# Patient Record
Sex: FEMALE | Race: BLACK OR AFRICAN AMERICAN | NOT HISPANIC OR LATINO | URBAN - METROPOLITAN AREA
[De-identification: names, ages, dates, MRNs, and addresses within clinical notes are randomized per-mention and may not be internally consistent; named-entity substitution may affect disease eponyms.]

---

## 2017-11-11 ENCOUNTER — EMERGENCY (EMERGENCY)
Facility: HOSPITAL | Age: 67
LOS: 1 days | Discharge: HOME | End: 2017-11-11
Attending: EMERGENCY MEDICINE | Admitting: EMERGENCY MEDICINE
Payer: MEDICARE

## 2017-11-11 VITALS
DIASTOLIC BLOOD PRESSURE: 77 MMHG | OXYGEN SATURATION: 100 % | HEART RATE: 82 BPM | SYSTOLIC BLOOD PRESSURE: 173 MMHG | RESPIRATION RATE: 19 BRPM

## 2017-11-11 DIAGNOSIS — Z95.4 PRESENCE OF OTHER HEART-VALVE REPLACEMENT: Chronic | ICD-10-CM

## 2017-11-11 LAB
ALBUMIN SERPL ELPH-MCNC: 4.1 G/DL — SIGNIFICANT CHANGE UP (ref 3.3–5)
ALP SERPL-CCNC: 125 U/L — HIGH (ref 40–120)
ALT FLD-CCNC: 17 U/L — SIGNIFICANT CHANGE UP (ref 4–33)
APTT BLD: 53 SEC — HIGH (ref 27.5–37.4)
AST SERPL-CCNC: 32 U/L — SIGNIFICANT CHANGE UP (ref 4–32)
BASOPHILS # BLD AUTO: 0.02 K/UL — SIGNIFICANT CHANGE UP (ref 0–0.2)
BASOPHILS # BLD AUTO: 0.04 K/UL — SIGNIFICANT CHANGE UP (ref 0–0.2)
BASOPHILS NFR BLD AUTO: 0.3 % — SIGNIFICANT CHANGE UP (ref 0–2)
BASOPHILS NFR BLD AUTO: 0.6 % — SIGNIFICANT CHANGE UP (ref 0–2)
BILIRUB SERPL-MCNC: 0.5 MG/DL — SIGNIFICANT CHANGE UP (ref 0.2–1.2)
BLD GP AB SCN SERPL QL: NEGATIVE — SIGNIFICANT CHANGE UP
BUN SERPL-MCNC: 21 MG/DL — SIGNIFICANT CHANGE UP (ref 7–23)
CALCIUM SERPL-MCNC: 9.3 MG/DL — SIGNIFICANT CHANGE UP (ref 8.4–10.5)
CHLORIDE SERPL-SCNC: 97 MMOL/L — LOW (ref 98–107)
CO2 SERPL-SCNC: 31 MMOL/L — SIGNIFICANT CHANGE UP (ref 22–31)
CREAT SERPL-MCNC: 1.02 MG/DL — SIGNIFICANT CHANGE UP (ref 0.5–1.3)
EOSINOPHIL # BLD AUTO: 0.03 K/UL — SIGNIFICANT CHANGE UP (ref 0–0.5)
EOSINOPHIL # BLD AUTO: 0.14 K/UL — SIGNIFICANT CHANGE UP (ref 0–0.5)
EOSINOPHIL NFR BLD AUTO: 0.4 % — SIGNIFICANT CHANGE UP (ref 0–6)
EOSINOPHIL NFR BLD AUTO: 2.2 % — SIGNIFICANT CHANGE UP (ref 0–6)
GLUCOSE SERPL-MCNC: 131 MG/DL — HIGH (ref 70–99)
HCT VFR BLD CALC: 29.5 % — LOW (ref 34.5–45)
HCT VFR BLD CALC: 31.6 % — LOW (ref 34.5–45)
HGB BLD-MCNC: 8.4 G/DL — LOW (ref 11.5–15.5)
HGB BLD-MCNC: 8.9 G/DL — LOW (ref 11.5–15.5)
IMM GRANULOCYTES # BLD AUTO: 0.02 # — SIGNIFICANT CHANGE UP
IMM GRANULOCYTES # BLD AUTO: 0.03 # — SIGNIFICANT CHANGE UP
IMM GRANULOCYTES NFR BLD AUTO: 0.3 % — SIGNIFICANT CHANGE UP (ref 0–1.5)
IMM GRANULOCYTES NFR BLD AUTO: 0.4 % — SIGNIFICANT CHANGE UP (ref 0–1.5)
INR BLD: 2.51 — HIGH (ref 0.88–1.17)
LYMPHOCYTES # BLD AUTO: 1.24 K/UL — SIGNIFICANT CHANGE UP (ref 1–3.3)
LYMPHOCYTES # BLD AUTO: 18.5 % — SIGNIFICANT CHANGE UP (ref 13–44)
LYMPHOCYTES # BLD AUTO: 2.64 K/UL — SIGNIFICANT CHANGE UP (ref 1–3.3)
LYMPHOCYTES # BLD AUTO: 41.2 % — SIGNIFICANT CHANGE UP (ref 13–44)
MCHC RBC-ENTMCNC: 24.7 PG — LOW (ref 27–34)
MCHC RBC-ENTMCNC: 24.7 PG — LOW (ref 27–34)
MCHC RBC-ENTMCNC: 28.2 % — LOW (ref 32–36)
MCHC RBC-ENTMCNC: 28.5 % — LOW (ref 32–36)
MCV RBC AUTO: 86.8 FL — SIGNIFICANT CHANGE UP (ref 80–100)
MCV RBC AUTO: 87.5 FL — SIGNIFICANT CHANGE UP (ref 80–100)
MONOCYTES # BLD AUTO: 0.54 K/UL — SIGNIFICANT CHANGE UP (ref 0–0.9)
MONOCYTES # BLD AUTO: 0.63 K/UL — SIGNIFICANT CHANGE UP (ref 0–0.9)
MONOCYTES NFR BLD AUTO: 8 % — SIGNIFICANT CHANGE UP (ref 2–14)
MONOCYTES NFR BLD AUTO: 9.8 % — SIGNIFICANT CHANGE UP (ref 2–14)
NEUTROPHILS # BLD AUTO: 2.96 K/UL — SIGNIFICANT CHANGE UP (ref 1.8–7.4)
NEUTROPHILS # BLD AUTO: 4.83 K/UL — SIGNIFICANT CHANGE UP (ref 1.8–7.4)
NEUTROPHILS NFR BLD AUTO: 46.2 % — SIGNIFICANT CHANGE UP (ref 43–77)
NEUTROPHILS NFR BLD AUTO: 72.1 % — SIGNIFICANT CHANGE UP (ref 43–77)
NRBC # FLD: 0 — SIGNIFICANT CHANGE UP
NRBC # FLD: 0 — SIGNIFICANT CHANGE UP
PLATELET # BLD AUTO: 348 K/UL — SIGNIFICANT CHANGE UP (ref 150–400)
PLATELET # BLD AUTO: 364 K/UL — SIGNIFICANT CHANGE UP (ref 150–400)
PMV BLD: 10.5 FL — SIGNIFICANT CHANGE UP (ref 7–13)
PMV BLD: 9.9 FL — SIGNIFICANT CHANGE UP (ref 7–13)
POTASSIUM SERPL-MCNC: 4.3 MMOL/L — SIGNIFICANT CHANGE UP (ref 3.5–5.3)
POTASSIUM SERPL-SCNC: 4.3 MMOL/L — SIGNIFICANT CHANGE UP (ref 3.5–5.3)
PROT SERPL-MCNC: 8.9 G/DL — HIGH (ref 6–8.3)
PROTHROM AB SERPL-ACNC: 28.7 SEC — HIGH (ref 9.8–13.1)
RBC # BLD: 3.4 M/UL — LOW (ref 3.8–5.2)
RBC # BLD: 3.61 M/UL — LOW (ref 3.8–5.2)
RBC # FLD: 17 % — HIGH (ref 10.3–14.5)
RBC # FLD: 17.1 % — HIGH (ref 10.3–14.5)
RH IG SCN BLD-IMP: POSITIVE — SIGNIFICANT CHANGE UP
SODIUM SERPL-SCNC: 139 MMOL/L — SIGNIFICANT CHANGE UP (ref 135–145)
WBC # BLD: 6.41 K/UL — SIGNIFICANT CHANGE UP (ref 3.8–10.5)
WBC # BLD: 6.71 K/UL — SIGNIFICANT CHANGE UP (ref 3.8–10.5)
WBC # FLD AUTO: 6.41 K/UL — SIGNIFICANT CHANGE UP (ref 3.8–10.5)
WBC # FLD AUTO: 6.71 K/UL — SIGNIFICANT CHANGE UP (ref 3.8–10.5)

## 2017-11-11 PROCEDURE — 99218: CPT | Mod: 25,GC

## 2017-11-11 PROCEDURE — 30901 CONTROL OF NOSEBLEED: CPT | Mod: 50,GC

## 2017-11-11 RX ORDER — LISINOPRIL 2.5 MG/1
20 TABLET ORAL DAILY
Qty: 0 | Refills: 0 | Status: DISCONTINUED | OUTPATIENT
Start: 2017-11-11 | End: 2017-11-15

## 2017-11-11 RX ORDER — SIMVASTATIN 20 MG/1
5 TABLET, FILM COATED ORAL AT BEDTIME
Qty: 0 | Refills: 0 | Status: DISCONTINUED | OUTPATIENT
Start: 2017-11-11 | End: 2017-11-15

## 2017-11-11 RX ORDER — METOPROLOL TARTRATE 50 MG
25 TABLET ORAL DAILY
Qty: 0 | Refills: 0 | Status: DISCONTINUED | OUTPATIENT
Start: 2017-11-11 | End: 2017-11-15

## 2017-11-11 RX ORDER — DIGOXIN 250 MCG
0.12 TABLET ORAL DAILY
Qty: 0 | Refills: 0 | Status: DISCONTINUED | OUTPATIENT
Start: 2017-11-11 | End: 2017-11-15

## 2017-11-11 RX ORDER — FUROSEMIDE 40 MG
40 TABLET ORAL DAILY
Qty: 0 | Refills: 0 | Status: DISCONTINUED | OUTPATIENT
Start: 2017-11-11 | End: 2017-11-15

## 2017-11-11 RX ORDER — CEPHALEXIN 500 MG
500 CAPSULE ORAL ONCE
Qty: 0 | Refills: 0 | Status: COMPLETED | OUTPATIENT
Start: 2017-11-11 | End: 2017-11-11

## 2017-11-11 RX ADMIN — Medication 0.12 MILLIGRAM(S): at 15:42

## 2017-11-11 RX ADMIN — Medication 25 MILLIGRAM(S): at 15:45

## 2017-11-11 RX ADMIN — LISINOPRIL 20 MILLIGRAM(S): 2.5 TABLET ORAL at 15:41

## 2017-11-11 RX ADMIN — SIMVASTATIN 5 MILLIGRAM(S): 20 TABLET, FILM COATED ORAL at 21:17

## 2017-11-11 RX ADMIN — Medication 500 MILLIGRAM(S): at 15:42

## 2017-11-11 RX ADMIN — Medication 40 MILLIGRAM(S): at 15:42

## 2017-11-11 NOTE — CONSULT NOTE ADULT - ASSESSMENT
67F with epistaxis on AC for MVR/TVR, now resolved. Packing has been removed. Patient currently has absorbable packing in place and has been instructed not to blow her nose.  -Nasal saline sprays 4 times per day  -Afrin and pressure if any bleeding recurs  -f/u CBC  -Dispo per ED

## 2017-11-11 NOTE — ED ADULT NURSE NOTE - OBJECTIVE STATEMENT
patient brought in by ambulance for nose bleeding, patient reports nose bleeding happened when she woke up from bed, no trauma or injury, denies: dizziness, SOB, chest pain, nausea/vomiting. Patient is alert and orients to person, place and time, is seeing by attending Dr. Valencia and resident at bed side, Phenylephrine HCl and nose pack applied per Dr. Valencia, on cardiac monitor, Osei, lab work sent, nose bleeding is controlled, airway is clear, Breathing spontaneous and unlabored. Breath sounds clear and equal bilaterally with regular rhythm. chest movement equally bilaterally, safety precautions maintain per hospital policy. patient brought in by ambulance for nose bleeding, patient reports nose bleeding happened when she woke up from bed, no trauma or injury, denies: dizziness, SOB, chest pain, nausea/vomiting. Patient is alert and orients to person, place and time, is seeing by attending Dr. Valencia and resident at bed side, Phenylephrine HCl nose spray and nose pack right and left nostrils applied per Dr. Valencia, on cardiac monitor, Osei, lab work sent, nose bleeding is controlled, airway is clear, Breathing spontaneous and unlabored. Breath sounds clear and equal bilaterally with regular rhythm. chest movement equally bilaterally, safety precautions maintain per hospital policy.

## 2017-11-11 NOTE — ED CDU PROVIDER INITIAL DAY NOTE - FAMILY HISTORY
Family history of stroke     Father  Still living? Unknown  Family history of hypertension, Age at diagnosis: Age Unknown     Sibling  Still living? Unknown  Family history of hypertension, Age at diagnosis: Age Unknown     Mother  Still living? Unknown  Family history of hypertension, Age at diagnosis: Age Unknown

## 2017-11-11 NOTE — ED ADULT TRIAGE NOTE - CHIEF COMPLAINT QUOTE
notification nose bleed for past 25 minutes, on coumadin, denies trauma/injury, patient states she "woke up like this".  EMS applying pressure to nose, clots present, patient alert, oriented x3, denies pain.  Hypertensive, respiratory status stable on room air, saturation adequate, speaking in full sentences.

## 2017-11-11 NOTE — ED PROVIDER NOTE - ATTENDING CONTRIBUTION TO CARE
Attending Statement: I have personally seen and examined this patient. I have fully participated in the care of this patient. I have reviewed all pertinent clinical information, including history physical exam, plan and the Resident's note and agree except as noted   66yo F BIBEMS from home co nose bleed. pt states she woke up feeling "something in the throat" found to be bleeding. Applied pressure and called EMS. No chest pain. No sob. no trauma. hx of MVR/TVR, atrial fib on coumadin, CHF,  COPD, hx of nose bleed a year ago.   Vital signs noted. pt sitting up bleeding from both nares right greater than left w clots. talking in full sentences. good air entry. no sign of facial trauma. slight blood in the posterior nemesio pharynx.  plan labs, ENT, nose packing, re assess

## 2017-11-11 NOTE — ED PROVIDER NOTE - PMH
Atrial fibrillation  S/P Ablation  CHF (congestive heart failure)    COPD (chronic obstructive pulmonary disease)  on home O2  Gout    HTN (hypertension)    Mitral valve replaced    MIGUEL (obstructive sleep apnea)    Pulmonary hypertension    Tricuspid valve replaced

## 2017-11-11 NOTE — ED CDU PROVIDER INITIAL DAY NOTE - PROGRESS NOTE DETAILS
CDU GERI Garcia: Spoke with ENT, pt tolerated removal of nasal packing well, no active bleeding, will repeat CBC and monitor overnight GERI Omalley: Pt NAD, VSS, no symptoms at this time. Not actively bleeding. PE: cardiac: RRR, Lungs:CTA, Abdomen: soft, nontender, nondistended. Pending AM labs.

## 2017-11-11 NOTE — ED CDU PROVIDER INITIAL DAY NOTE - OBJECTIVE STATEMENT
67yF w/pmhx a-fib on coumadin, CHF, COPD, s/p MVR/TVR presenting with epistaxis that began this morning. Pt states she was lying in bed when she felt blood dripping down her face. She states she soaked 1.5 hand towels prior to arrival and the ED. Pt states she is short of breath with exertion but this is her baseline. Pt denies lightheadedness, dizziness, palpitations, chest pain, abdominal pain, n/v/d, f/c, hematuria, dark stools or any other complaints. Pt has had prior episode of epistaxis while on coumadin.     In the ED vinyl rockets were places in both nares, 7.5cm in right nare and 5.5cm in left nare. Pt has had stable vital signs throughout visit without any difficulty breathing or protecting her airway. 67yF w/pmhx a-fib on coumadin, CHF, COPD, s/p MVR/TVR presenting with epistaxis that began this morning. Pt states she was lying in bed when she felt blood dripping down her face. She states she soaked 1.5 hand towels prior to arrival and the ED. Pt states she is short of breath with exertion but this is her baseline. Pt denies trauma/injury, lightheadedness, dizziness, palpitations, chest pain, abdominal pain, n/v/d, f/c, hematuria, dark stools or any other complaints. Pt has had prior episode of epistaxis while on coumadin.     In the ED vinyl rockets were places in both nares, 7.5cm in right nare and 5.5cm in left nare. Pt has had stable vital signs throughout visit without any difficulty breathing or protecting her airway. 67yF w/pmhx a-fib on coumadin, CHF, COPD, s/p MVR/TVR presenting with epistaxis that began this morning. Pt states she was lying in bed when she felt blood dripping down her face at approximately 7AM. She states she soaked 1.5 hand towels prior to arrival and the ED and was unable to stop the bleeding. Pt states she is short of breath with exertion but this is her baseline. Pt denies trauma/injury, lightheadedness, dizziness, palpitations, chest pain, abdominal pain, n/v/d, f/c, hematuria, dark stools or any other complaints. Pt has had prior episode of epistaxis while on coumadin.     In the ED vinyl rockets were places in both nares, 7.5cm in right nare and 5.5cm in left nare. Pt has had stable vital signs throughout visit without any difficulty breathing or protecting her airway.

## 2017-11-11 NOTE — ED CDU PROVIDER INITIAL DAY NOTE - PHYSICAL EXAMINATION
Packing is in place in nares b/l with no signs of active bleeding Packing is in place in nares b/l   ENT: Posterior pharynx difficult to visualize given pt anatomy

## 2017-11-11 NOTE — ED PROVIDER NOTE - MEDICAL DECISION MAKING DETAILS
67F s/p MVR/TVR, afib, on coumadin presenting with a cc of intractable epistaxis beginning earlier this morning, no nose/facial trauma, was sleeping when felt post nasal drip, noted to be bleeding, x1 prior episode, this more severe, no lightheadedness, dizziness, on exam vss, jacey epistaxis R>L s/p nasal packing w/ nasal phenylephrine R:Ant/Post L: Ant, ENT consult, likely admit vs cdu

## 2017-11-11 NOTE — ED CDU PROVIDER INITIAL DAY NOTE - ATTENDING CONTRIBUTION TO CARE
DR Bloch- Morbidly obese woman, on coumadin for afib, c/o epistaxis, controlled in ED with bilateral rhinorockets, VSS afebrile, HEENT rhino rockets in place, no active bleeding, throat mallampati 4 , neck supple, heart sounds nml lungs clear, abd soft nontender IMP epistaxis, controlled- on coumadin- will hold coumadin, , ent consult, reassess

## 2017-11-11 NOTE — ED CDU PROVIDER INITIAL DAY NOTE - MEDICAL DECISION MAKING DETAILS
67yF w/pmhx a-fib on coumadin, CHF, COPD, s/p MVR/TVR presenting with epistaxis. Packing inserted in main ED. In CDU for ENT consult and to recheck CBC at 2PM.

## 2017-11-11 NOTE — ED PROVIDER NOTE - PROGRESS NOTE DETAILS
pt still slight bleeding from the right nare. no sob. pending ENT consult. MICHELLE w ENT will comes see pt in approx 2 hours. pt informed. bleeding controlled. Pt assessed at beside. Pt resting comfortably, pain controlled, pt questions answered. Vital signs stable. Bleeding controlled. Discussed case with CDU PA, plan to repeat h/h at 1400, ENT f/u, plan per ENT, consider d/c if cleared by ENT. Will send pt to CDU  Thee Mcelroy MD  PGY-1 Emergency Medicine

## 2017-11-11 NOTE — CONSULT NOTE ADULT - SUBJECTIVE AND OBJECTIVE BOX
67F PMHx MVR/TVR on coumadin presented with epistaxis starting this morning. After presentation to ED, bilateral rhino rockets were placed, which dramatically reduced the bleeding. Pt reports no bleeding for the past several hours. No history of nasal trauma. no dizziness or lightheadedness. No prior nasal surgeries     Past Medical History:  Atrial fibrillation  S/P Ablation  CHF (congestive heart failure)    COPD (chronic obstructive pulmonary disease)  on home O2  Gout    HTN (hypertension)    Mitral valve replaced    MIGUEL (obstructive sleep apnea)    Pulmonary hypertension    Tricuspid valve replaced.     Past Surgical History:  History of mitral valve replacement with mechanical valve    Tricuspid valve replaced  mechanical.    NKDA    ROS all 14 systems reviewed otherwise negative    Vital signs reviewed in EMR, stable, afebrile  NAD, alert  NC/AT  EOMI  NC: bilateral rapid rhinos in place, inflated, no drainage or bleeding  OC: tongue wnl, OP clear, no blood present in OP  Neck: soft/flat    Procedure: Nasal packing was deflated and removed. Irrigated nasal cavity with NS bilaterally. Applied afrin soaked surgicel bilaterally. Pt tolerated well. No evidence of bleeding

## 2017-11-12 VITALS
SYSTOLIC BLOOD PRESSURE: 157 MMHG | HEART RATE: 65 BPM | DIASTOLIC BLOOD PRESSURE: 86 MMHG | TEMPERATURE: 98 F | RESPIRATION RATE: 18 BRPM | OXYGEN SATURATION: 96 %

## 2017-11-12 LAB
APTT BLD: 49.5 SEC — HIGH (ref 27.5–37.4)
BASOPHILS # BLD AUTO: 0.03 K/UL — SIGNIFICANT CHANGE UP (ref 0–0.2)
BASOPHILS NFR BLD AUTO: 0.4 % — SIGNIFICANT CHANGE UP (ref 0–2)
EOSINOPHIL # BLD AUTO: 0.08 K/UL — SIGNIFICANT CHANGE UP (ref 0–0.5)
EOSINOPHIL NFR BLD AUTO: 1.1 % — SIGNIFICANT CHANGE UP (ref 0–6)
HCT VFR BLD CALC: 30.4 % — LOW (ref 34.5–45)
HGB BLD-MCNC: 8.6 G/DL — LOW (ref 11.5–15.5)
IMM GRANULOCYTES # BLD AUTO: 0.02 # — SIGNIFICANT CHANGE UP
IMM GRANULOCYTES NFR BLD AUTO: 0.3 % — SIGNIFICANT CHANGE UP (ref 0–1.5)
INR BLD: 2.21 — HIGH (ref 0.88–1.17)
LYMPHOCYTES # BLD AUTO: 1.67 K/UL — SIGNIFICANT CHANGE UP (ref 1–3.3)
LYMPHOCYTES # BLD AUTO: 24 % — SIGNIFICANT CHANGE UP (ref 13–44)
MCHC RBC-ENTMCNC: 24.8 PG — LOW (ref 27–34)
MCHC RBC-ENTMCNC: 28.3 % — LOW (ref 32–36)
MCV RBC AUTO: 87.6 FL — SIGNIFICANT CHANGE UP (ref 80–100)
MONOCYTES # BLD AUTO: 0.6 K/UL — SIGNIFICANT CHANGE UP (ref 0–0.9)
MONOCYTES NFR BLD AUTO: 8.6 % — SIGNIFICANT CHANGE UP (ref 2–14)
NEUTROPHILS # BLD AUTO: 4.57 K/UL — SIGNIFICANT CHANGE UP (ref 1.8–7.4)
NEUTROPHILS NFR BLD AUTO: 65.6 % — SIGNIFICANT CHANGE UP (ref 43–77)
NRBC # FLD: 0 — SIGNIFICANT CHANGE UP
PLATELET # BLD AUTO: 384 K/UL — SIGNIFICANT CHANGE UP (ref 150–400)
PMV BLD: 9.8 FL — SIGNIFICANT CHANGE UP (ref 7–13)
PROTHROM AB SERPL-ACNC: 25.1 SEC — HIGH (ref 9.8–13.1)
RBC # BLD: 3.47 M/UL — LOW (ref 3.8–5.2)
RBC # FLD: 17.2 % — HIGH (ref 10.3–14.5)
WBC # BLD: 6.97 K/UL — SIGNIFICANT CHANGE UP (ref 3.8–10.5)
WBC # FLD AUTO: 6.97 K/UL — SIGNIFICANT CHANGE UP (ref 3.8–10.5)

## 2017-11-12 PROCEDURE — 30905 CONTROL OF NOSEBLEED: CPT

## 2017-11-12 PROCEDURE — 99217: CPT | Mod: 25

## 2017-11-12 RX ORDER — WARFARIN SODIUM 2.5 MG/1
6 TABLET ORAL ONCE
Qty: 0 | Refills: 0 | Status: COMPLETED | OUTPATIENT
Start: 2017-11-12 | End: 2017-11-12

## 2017-11-12 RX ADMIN — Medication 40 MILLIGRAM(S): at 05:47

## 2017-11-12 RX ADMIN — LISINOPRIL 20 MILLIGRAM(S): 2.5 TABLET ORAL at 05:48

## 2017-11-12 RX ADMIN — WARFARIN SODIUM 6 MILLIGRAM(S): 2.5 TABLET ORAL at 08:43

## 2017-11-12 RX ADMIN — Medication 25 MILLIGRAM(S): at 05:47

## 2017-11-12 RX ADMIN — Medication 0.12 MILLIGRAM(S): at 05:47

## 2017-11-12 NOTE — ED CDU PROVIDER SUBSEQUENT DAY NOTE - MEDICAL DECISION MAKING DETAILS
67 year old female with a PMHx a-fib on coumadin, CHF, COPD, s/p MVR/TVR presenting with atraumatic epistaxis that began this yesterday morning at 07:00.  Plan: am labs

## 2017-11-12 NOTE — ED CDU PROVIDER SUBSEQUENT DAY NOTE - PROGRESS NOTE DETAILS
GERI Omalley: pt NAD - resting comfortably, VSS. No events on tele. Pending am labs. Pt endorses complete resolution of nose bleed without any recurrence.  Pt notes h/o mechanical valve.  Will check PT/INR at this time.  Pt to be discharged with ENT follow up and strict return precautions.

## 2017-11-12 NOTE — ED CDU PROVIDER SUBSEQUENT DAY NOTE - ATTENDING CONTRIBUTION TO CARE
DR Bloch- patient feels well, no further bleeding, HEENT no blood in nares or throat, heart sounds nml lungs clear. abd soft. hct 30, INR 2.2

## 2017-11-12 NOTE — ED CDU PROVIDER SUBSEQUENT DAY NOTE - HISTORY
67 year old female with a PMHx a-fib on coumadin, CHF, COPD, s/p MVR/TVR presenting with atraumatic epistaxis that began this yesterday morning at 07:00. Laying down when she felt nose bleeding. Unable to control the bleeding prior to coming to the ED and saturated 1.5 hand towels. Has had a similar episode in the past. Pt has VILLA at baseline. Pt denies trauma/injury, lightheadedness, dizziness, palpitations, chest pain, abdominal pain, n/v/d, f/c, hematuria, dark stools or any other complaints.   In the ED rhino rockets were places in both nares, 7.5cm in right nare and 5.5cm in left nare which were then removed and packing was placed. Pt NAD, VSS, hbg/hct stable  - am labs pending. 67 year old female with a PMHx a-fib on coumadin, CHF, COPD, s/p MVR/TVR presenting with atraumatic epistaxis that began this yesterday morning at 07:00. Laying down when she felt nose bleeding. Unable to control the bleeding prior to coming to the ED and saturated 1.5 hand towels. Has had a similar episode in the past. Pt has VILLA at baseline. Pt denies trauma/injury, lightheadedness, dizziness, palpitations, chest pain, abdominal pain, n/v/d, f/c, hematuria, dark stools or any other complaints.   In the ED rhino rockets were places in both nares, which were then removed and packing was placed. Pt NAD, VSS, hbg/hct stable (hbg 8.9->8.4 in 4 hour period) - am labs pending.

## 2017-11-12 NOTE — ED CDU PROVIDER DISPOSITION NOTE - CLINICAL COURSE
Dr Bloch- Patient had no further bleeding, feels well, VSS afebrile, HEENT no bleeding from nares or evidence of blood in throat. Neck supple  lungs clear, abd soft nontender heart sounds , click heard( likely mechanical valve), ext no edema. HCT 30, INR 2.2 Okay for discharge- given instructions for nasal saline, humidifier, no blowing nose.

## 2018-10-09 ENCOUNTER — EMERGENCY (EMERGENCY)
Facility: HOSPITAL | Age: 68
LOS: 1 days | Discharge: HOME | End: 2018-10-09
Attending: EMERGENCY MEDICINE | Admitting: EMERGENCY MEDICINE
Payer: MEDICARE

## 2018-10-09 VITALS
SYSTOLIC BLOOD PRESSURE: 162 MMHG | RESPIRATION RATE: 20 BRPM | DIASTOLIC BLOOD PRESSURE: 77 MMHG | OXYGEN SATURATION: 98 % | TEMPERATURE: 98 F | HEART RATE: 72 BPM

## 2018-10-09 DIAGNOSIS — Z95.4 PRESENCE OF OTHER HEART-VALVE REPLACEMENT: Chronic | ICD-10-CM

## 2018-10-09 PROCEDURE — 72100 X-RAY EXAM L-S SPINE 2/3 VWS: CPT | Mod: 26

## 2018-10-09 PROCEDURE — 99283 EMERGENCY DEPT VISIT LOW MDM: CPT | Mod: GC

## 2018-10-09 PROCEDURE — 73501 X-RAY EXAM HIP UNI 1 VIEW: CPT | Mod: 26,LT

## 2018-10-09 RX ORDER — DIAZEPAM 5 MG
5 TABLET ORAL ONCE
Qty: 0 | Refills: 0 | Status: DISCONTINUED | OUTPATIENT
Start: 2018-10-09 | End: 2018-10-09

## 2018-10-09 RX ORDER — LIDOCAINE 4 G/100G
1 CREAM TOPICAL ONCE
Qty: 0 | Refills: 0 | Status: COMPLETED | OUTPATIENT
Start: 2018-10-09 | End: 2018-10-09

## 2018-10-09 RX ORDER — ACETAMINOPHEN 500 MG
650 TABLET ORAL ONCE
Qty: 0 | Refills: 0 | Status: COMPLETED | OUTPATIENT
Start: 2018-10-09 | End: 2018-10-09

## 2018-10-09 RX ADMIN — Medication 650 MILLIGRAM(S): at 22:22

## 2018-10-09 RX ADMIN — Medication 5 MILLIGRAM(S): at 22:22

## 2018-10-09 RX ADMIN — LIDOCAINE 1 PATCH: 4 CREAM TOPICAL at 22:22

## 2018-10-09 NOTE — ED SUB INTERN NOTE - CONSTITUTIONAL, MLM
normal... Well appearing, well nourished, awake, alert, oriented to person, place, time/situation and in moderate distress due to back pain and left hip pain.

## 2018-10-09 NOTE — ED ADULT NURSE NOTE - OBJECTIVE STATEMENT
pt aox4; reports lower back pain. on home o2 at 3 Liters, given po meds.  Lidocaine patch placed on lower back. Pt sent for xray. pt in no respiratory distress. VSS. Will continue to monitor/assess

## 2018-10-09 NOTE — ED SUB INTERN NOTE - OBJECTIVE STATEMENT FT
68 year old female with PMH of open heart surgery x2, COPD, CHF, HTN, gout presenting with lower lumbar pain and left hip pain that started 2 to 3 months ago, but has gotten worse these past two weeks. The pain is located along the lower lumbar spine and left hip with intermittent paresthesia and numbness down to the left leg to the left foot. The pain is constant, sharp, aggravated with movement. She also complains of left gluteal numbness.

## 2018-10-09 NOTE — ED SUB INTERN NOTE - PROGRESS NOTE DETAILS
Xrays of hips and lumbar spine show degenerative disc disease, but no acute fx or dislocations. Pt will try ambulation and OK for d/c with lidocaine patch

## 2018-10-10 RX ORDER — LIDOCAINE 4 G/100G
1 CREAM TOPICAL ONCE
Qty: 0 | Refills: 0 | Status: COMPLETED | OUTPATIENT
Start: 2018-10-10 | End: 2018-10-10

## 2018-10-10 NOTE — ED PROVIDER NOTE - NS ED ROS FT
· CONSTITUTIONAL: no fever and no chills.  · EYES: no discharge, no irritation, no pain, no redness, and no visual changes.  · ENMT: Ears: no ear pain and no hearing problems.Nose: no nasal congestion and no nasal drainage.Mouth/Throat: no dysphagia, no hoarseness and no throat pain.Neck: no lumps, no pain, no stiffness and no swollen glands.  · CARDIOVASCULAR: no chest pain and no edema.  · RESPIRATORY: no chest pain, no cough, and no shortness of breath.  · GASTROINTESTINAL: no abdominal pain, no bloating, no constipation, no diarrhea, no nausea and no vomiting.  · GENITOURINARY: no dysuria, no frequency, and no hematuria.  · MUSCULOSKELETAL: - - -  · Musculoskeletal [+]: BACK PAIN, left hip pain  · SKIN: no abrasions, no jaundice, no lesions, no pruritis, and no rashes.  · NEUROLOGICAL: - - -  · Neurological [+]: DIFFICULTY WALKING / IMBALANCE, left leg numbness and paresthesia, pain with ambulation  · Neurological [-]: no headache  · PSYCHIATRIC: no known mental health issues.  · ENDOCRINE: no diabetes and no thyroid trouble.  · HEME/LYMPH: no anemia, no easy bruising, no jaundice, no swollen lymph nodes.  · ALLERGIC/IMMUNOLOGIC: no dermatitis, no environmental allergies, no food allergies, no immunosuppressive disorder, and no pruritus.

## 2018-10-10 NOTE — ED PROVIDER NOTE - MEDICAL DECISION MAKING DETAILS
Pt with chronic back and hip pain, found to have no acute fx or dislocation on x-ray. Pt without neurological deficits. Ambulating. Pain under better control with PO medication and lidocaine patch. OK to d/c with lidocaine patch and close follow-up with PCP.

## 2018-10-10 NOTE — ED PROVIDER NOTE - PROGRESS NOTE DETAILS
Xrays of hips and lumbar spine show degenerative disc disease, but no acute fx or dislocations. Pt will try ambulation and OK for d/c with lidocaine patch and outpatient follow-up.

## 2018-10-10 NOTE — ED PROVIDER NOTE - ATTENDING CONTRIBUTION TO CARE
I was physically present for the E/M service provided. I agree with above history, physical, and plan which I have reviewed and edited where appropriate. I was physically present for the key portions of the service provided.    [place]: 68 year old female with PMH of open heart surgery x2, COPD, CHF, HTN, gout presenting with lower lumbar pain and left hip pain that started 2 to 3 months ago, but has gotten worse these past two weeks. now radiates to toes with a shooting electric pain.  seen a ortho md 6 month ago and had steroid injection with minimal improvement.  tried PT- no help.  denies any urinary/bowel incontinence, no fever, no dysuria, no fall or trauma, no weigh loss, no abd pain, no n/v.     *GEN:   uncomfortable, in mild apparent distress, AOx3  *EYES:   PERRL, extra-occular movements intact  *HEENT:   airway patent, moist mucosal membranes, uvula midline  *CV:   regular rate and rhythm, normal S1/S2, no murmur  *RESP:   clear to auscultation bilaterally, non-labored, speaking in full sentences  *ABD:   soft, non tender, no guarding  *:   no cva tenderness  *MSK:   LLE musculoskeletal tenderness, moving all extremity, no midline tenderness, ttp at left gluteal and lower lateral lumbar back  *SKIN:   dry, intact, no rash  *NEURO:   AOx3, no focal weakness or loss of sensation, gait hunched, + straight leg raise on LLE, GCS 15    a/p: lumbar radiculopathy r/o fracture.  xr lumbar, pain meds, heat pack, likely dc home with f/u.  no concern for cord compression or epidural abscess/hematoma at this time.

## 2018-11-22 ENCOUNTER — EMERGENCY (EMERGENCY)
Facility: HOSPITAL | Age: 68
LOS: 0 days | Discharge: HOME | End: 2018-11-22
Attending: EMERGENCY MEDICINE
Payer: MEDICARE

## 2018-11-22 VITALS
TEMPERATURE: 98 F | HEART RATE: 63 BPM | SYSTOLIC BLOOD PRESSURE: 153 MMHG | HEIGHT: 64 IN | DIASTOLIC BLOOD PRESSURE: 82 MMHG | OXYGEN SATURATION: 100 % | WEIGHT: 240.08 LBS | RESPIRATION RATE: 19 BRPM

## 2018-11-22 VITALS
OXYGEN SATURATION: 99 % | HEART RATE: 60 BPM | SYSTOLIC BLOOD PRESSURE: 143 MMHG | DIASTOLIC BLOOD PRESSURE: 71 MMHG | TEMPERATURE: 98 F | RESPIRATION RATE: 18 BRPM

## 2018-11-22 DIAGNOSIS — M54.9 DORSALGIA, UNSPECIFIED: ICD-10-CM

## 2018-11-22 DIAGNOSIS — E78.5 HYPERLIPIDEMIA, UNSPECIFIED: ICD-10-CM

## 2018-11-22 DIAGNOSIS — Z79.82 LONG TERM (CURRENT) USE OF ASPIRIN: ICD-10-CM

## 2018-11-22 DIAGNOSIS — I10 ESSENTIAL (PRIMARY) HYPERTENSION: ICD-10-CM

## 2018-11-22 DIAGNOSIS — Z95.4 PRESENCE OF OTHER HEART-VALVE REPLACEMENT: Chronic | ICD-10-CM

## 2018-11-22 PROCEDURE — 99283 EMERGENCY DEPT VISIT LOW MDM: CPT

## 2018-11-22 RX ORDER — CYCLOBENZAPRINE HYDROCHLORIDE 10 MG/1
5 TABLET, FILM COATED ORAL ONCE
Qty: 0 | Refills: 0 | Status: COMPLETED | OUTPATIENT
Start: 2018-11-22 | End: 2018-11-22

## 2018-11-22 RX ORDER — ALPRAZOLAM 0.25 MG
0.25 TABLET ORAL ONCE
Qty: 0 | Refills: 0 | Status: DISCONTINUED | OUTPATIENT
Start: 2018-11-22 | End: 2018-11-22

## 2018-11-22 RX ORDER — CYCLOBENZAPRINE HYDROCHLORIDE 10 MG/1
1 TABLET, FILM COATED ORAL
Qty: 9 | Refills: 0
Start: 2018-11-22 | End: 2018-11-24

## 2018-11-22 RX ORDER — OXYCODONE AND ACETAMINOPHEN 5; 325 MG/1; MG/1
1 TABLET ORAL ONCE
Qty: 0 | Refills: 0 | Status: DISCONTINUED | OUTPATIENT
Start: 2018-11-22 | End: 2018-11-22

## 2018-11-22 RX ORDER — KETOROLAC TROMETHAMINE 30 MG/ML
30 SYRINGE (ML) INJECTION ONCE
Qty: 0 | Refills: 0 | Status: DISCONTINUED | OUTPATIENT
Start: 2018-11-22 | End: 2018-11-22

## 2018-11-22 RX ADMIN — Medication 30 MILLIGRAM(S): at 14:11

## 2018-11-22 RX ADMIN — Medication 0.25 MILLIGRAM(S): at 14:11

## 2018-11-22 RX ADMIN — CYCLOBENZAPRINE HYDROCHLORIDE 5 MILLIGRAM(S): 10 TABLET, FILM COATED ORAL at 14:10

## 2018-11-22 RX ADMIN — OXYCODONE AND ACETAMINOPHEN 1 TABLET(S): 5; 325 TABLET ORAL at 14:10

## 2018-11-22 RX ADMIN — Medication 30 MILLIGRAM(S): at 14:41

## 2018-11-22 RX ADMIN — OXYCODONE AND ACETAMINOPHEN 1 TABLET(S): 5; 325 TABLET ORAL at 14:41

## 2018-11-22 NOTE — ED PROVIDER NOTE - OBJECTIVE STATEMENT
Pertinent PMH/PSH/FHx/SHx and Review of Systems contained within:  Patient presents to the ED for lower back pain.  PMH of afib, HTN, HLD, back pain..  this is actue on chronic.  no red flags.  no trauma.  family bedisde.  Non toxic.  Well appearing. No aggravating or relieving factors. No other pertinent PMH.  No other pertinent PSH.  No other pertinent FHx.  Patient denies EtOH/tobacco/illicit substance use. No fever/chills, No photophobia/eye pain/changes in vision, No ear pain/sore throat/dysphagia, No chest pain/palpitations, no SOB/cough/wheeze/stridor, No abdominal pain, No N/V/D, no dysuria/frequency/discharge, No neck pain, no rash, no changes in neurological status/function.

## 2018-11-22 NOTE — ED PROVIDER NOTE - NS_EDPROVIDERDISPOUSERTYPE_ED_A_ED
Nutrition Services: Calorie Count Results Day 1/10-1/11  No tickets for calorie count. No envelope hanging in door. No orders in chart for calorie count. Per ADLs, patient is consuming % of meals. Per chart review, patient to discharge to  today. Patient is 88 years old and is used to eating small portions; nutritional intake adequate for age.   PLAN / RECOMMEND - Nutrition Rep to see patient daily. Please document all intake as percentage of meal consumed. RD to monitor per department policy. Please re-consult for calorie count if with POC.    Attending Attestation (For Attendings USE Only)...

## 2018-11-22 NOTE — ED PROVIDER NOTE - MEDICAL DECISION MAKING DETAILS
Patient presents with back pain.  VSS.  feeling much better after meds.  smiling.  moving normally.  wants to d/c.  Patient given prescription medications for their condition and advised to take them as prescribed and check with their Primary Care Provider if any questions arise. Discussed results and outcome of testing with the patient.  Patient advised to please follow up with their primary care doctor within the next 24 hours and return to the Emergency Department for worsening symptoms or any other concerns.  Patient advised that their doctor may call  to follow up on the specific results of the tests performed today in the emergency department.

## 2018-11-22 NOTE — ED ADULT NURSE NOTE - NSIMPLEMENTINTERV_GEN_ALL_ED
Implemented All Fall Risk Interventions:  [place] to call system. Call bell, personal items and telephone within reach. Instruct patient to call for assistance. Room bathroom lighting operational. Non-slip footwear when patient is off stretcher. Physically safe environment: no spills, clutter or unnecessary equipment. Stretcher in lowest position, wheels locked, appropriate side rails in place. Provide visual cue, wrist band, yellow gown, etc. Monitor gait and stability. Monitor for mental status changes and reorient to person, place, and time. Review medications for side effects contributing to fall risk. Reinforce activity limits and safety measures with patient and family.

## 2018-11-22 NOTE — ED PROVIDER NOTE - PHYSICAL EXAMINATION
Gen: Alert, NAD Head: NC, AT, PERRL, EOMI, normal lids/conjunctiva ENT: normal hearing, patent oropharynx without erythema/exudate, uvula midline Neck: +supple, no tenderness/meningismus/JVD, +Trachea midline  Pulm: Bilateral BS, normal resp effort, no wheeze/stridor/retractions CV: RRR, no M/R/G, +dist pulses Abd: soft, NT/ND, +BS, no hepatosplenomegaly Mskel: TTP L3-5 right paraspinal, no edema/erythema/cyanosis Skin: no rash Neuro: AAOx3, no sensory/motor deficits, CN 2-12 intact

## 2018-11-22 NOTE — ED ADULT NURSE NOTE - OBJECTIVE STATEMENT
pt c/o pain to lower back started about a couple getting worse denies any pain , difficulty ambulating, use cane at home

## 2018-11-22 NOTE — ED ADULT TRIAGE NOTE - CHIEF COMPLAINT QUOTE
patient c/o of back pain radiating R leg started 2 days ago , patient denied chest pain , denied difficulty breathing , denied headache denied dizziness denied N/V , denied abdominal pain denied dysuria no blood in urine patient is on oxygen 2 L NC at home

## 2018-12-03 NOTE — ED ADULT NURSE NOTE - PAIN RATING/NUMBER SCALE (0-10): REST
Pt prescribed Diflucan last week by Dr. Rick Hodges for a yeast infection. Pt took 1 pill. Pt voices her symptoms are better, but she still has vaginal itching and slight burning especially when urinating. Pt requesting another dose of Diflucan. 10

## 2018-12-03 NOTE — ED PROVIDER NOTE - OBJECTIVE STATEMENT
Pt is a 67F with a PMH of x2 MVR/TVR, a-fib on coumadin, CHF, COPD presenting with a cc of epistaxis beginning at approx 0700 this a.m pt reports was sleeping, awoke with sensation of throat drip, found to have jacey nosebleed, bleeding did not stop prompting call to EMS. Pt denies trauma, fall. No lightheadedness, dizziness. Previous episodes of jacey epistaxis before, however this episode more severe. No trouble bleeding. Denies n/v/f/c/cp/sob. Denies headache, syncope, lightheadedness, dizziness. Denies chest palpitations, abdominal pain. Denies dysuria, hematuria, hematochezia, BRBPR, tarry stools, diarrhea, constipation. None known in lifetime

## 2019-06-22 ENCOUNTER — EMERGENCY (EMERGENCY)
Facility: HOSPITAL | Age: 69
LOS: 1 days | Discharge: HOME | End: 2019-06-22
Attending: EMERGENCY MEDICINE | Admitting: EMERGENCY MEDICINE
Payer: MEDICARE

## 2019-06-22 VITALS
SYSTOLIC BLOOD PRESSURE: 181 MMHG | RESPIRATION RATE: 20 BRPM | OXYGEN SATURATION: 97 % | DIASTOLIC BLOOD PRESSURE: 67 MMHG | TEMPERATURE: 98 F | HEART RATE: 86 BPM

## 2019-06-22 VITALS
HEART RATE: 67 BPM | DIASTOLIC BLOOD PRESSURE: 56 MMHG | SYSTOLIC BLOOD PRESSURE: 142 MMHG | OXYGEN SATURATION: 100 % | RESPIRATION RATE: 20 BRPM

## 2019-06-22 DIAGNOSIS — Z95.4 PRESENCE OF OTHER HEART-VALVE REPLACEMENT: Chronic | ICD-10-CM

## 2019-06-22 LAB
ALBUMIN SERPL ELPH-MCNC: 3.9 G/DL — SIGNIFICANT CHANGE UP (ref 3.3–5)
ALP SERPL-CCNC: 111 U/L — SIGNIFICANT CHANGE UP (ref 40–120)
ALT FLD-CCNC: 11 U/L — SIGNIFICANT CHANGE UP (ref 4–33)
ANION GAP SERPL CALC-SCNC: 10 MMO/L — SIGNIFICANT CHANGE UP (ref 7–14)
APTT BLD: 61.6 SEC — HIGH (ref 27.5–36.3)
AST SERPL-CCNC: 18 U/L — SIGNIFICANT CHANGE UP (ref 4–32)
BASOPHILS # BLD AUTO: 0.02 K/UL — SIGNIFICANT CHANGE UP (ref 0–0.2)
BASOPHILS NFR BLD AUTO: 0.3 % — SIGNIFICANT CHANGE UP (ref 0–2)
BILIRUB SERPL-MCNC: 0.4 MG/DL — SIGNIFICANT CHANGE UP (ref 0.2–1.2)
BUN SERPL-MCNC: 38 MG/DL — HIGH (ref 7–23)
CALCIUM SERPL-MCNC: 10.1 MG/DL — SIGNIFICANT CHANGE UP (ref 8.4–10.5)
CHLORIDE SERPL-SCNC: 97 MMOL/L — LOW (ref 98–107)
CO2 SERPL-SCNC: 30 MMOL/L — SIGNIFICANT CHANGE UP (ref 22–31)
CREAT SERPL-MCNC: 1.31 MG/DL — HIGH (ref 0.5–1.3)
EOSINOPHIL # BLD AUTO: 0.05 K/UL — SIGNIFICANT CHANGE UP (ref 0–0.5)
EOSINOPHIL NFR BLD AUTO: 0.8 % — SIGNIFICANT CHANGE UP (ref 0–6)
GLUCOSE SERPL-MCNC: 118 MG/DL — HIGH (ref 70–99)
HCT VFR BLD CALC: 28.7 % — LOW (ref 34.5–45)
HGB BLD-MCNC: 8.4 G/DL — LOW (ref 11.5–15.5)
IMM GRANULOCYTES NFR BLD AUTO: 0.6 % — SIGNIFICANT CHANGE UP (ref 0–1.5)
INR BLD: 4.42 — HIGH (ref 0.88–1.17)
LYMPHOCYTES # BLD AUTO: 1.03 K/UL — SIGNIFICANT CHANGE UP (ref 1–3.3)
LYMPHOCYTES # BLD AUTO: 16.7 % — SIGNIFICANT CHANGE UP (ref 13–44)
MCHC RBC-ENTMCNC: 27 PG — SIGNIFICANT CHANGE UP (ref 27–34)
MCHC RBC-ENTMCNC: 29.3 % — LOW (ref 32–36)
MCV RBC AUTO: 92.3 FL — SIGNIFICANT CHANGE UP (ref 80–100)
MONOCYTES # BLD AUTO: 0.56 K/UL — SIGNIFICANT CHANGE UP (ref 0–0.9)
MONOCYTES NFR BLD AUTO: 9.1 % — SIGNIFICANT CHANGE UP (ref 2–14)
NEUTROPHILS # BLD AUTO: 4.48 K/UL — SIGNIFICANT CHANGE UP (ref 1.8–7.4)
NEUTROPHILS NFR BLD AUTO: 72.5 % — SIGNIFICANT CHANGE UP (ref 43–77)
NRBC # FLD: 0.03 K/UL — SIGNIFICANT CHANGE UP (ref 0–0)
PLATELET # BLD AUTO: 300 K/UL — SIGNIFICANT CHANGE UP (ref 150–400)
PMV BLD: 9.5 FL — SIGNIFICANT CHANGE UP (ref 7–13)
POTASSIUM SERPL-MCNC: 5 MMOL/L — SIGNIFICANT CHANGE UP (ref 3.5–5.3)
POTASSIUM SERPL-SCNC: 5 MMOL/L — SIGNIFICANT CHANGE UP (ref 3.5–5.3)
PROT SERPL-MCNC: 9 G/DL — HIGH (ref 6–8.3)
PROTHROM AB SERPL-ACNC: 52.8 SEC — HIGH (ref 9.8–13.1)
RBC # BLD: 3.11 M/UL — LOW (ref 3.8–5.2)
RBC # FLD: 14.6 % — HIGH (ref 10.3–14.5)
SODIUM SERPL-SCNC: 137 MMOL/L — SIGNIFICANT CHANGE UP (ref 135–145)
WBC # BLD: 6.18 K/UL — SIGNIFICANT CHANGE UP (ref 3.8–10.5)
WBC # FLD AUTO: 6.18 K/UL — SIGNIFICANT CHANGE UP (ref 3.8–10.5)

## 2019-06-22 PROCEDURE — 99283 EMERGENCY DEPT VISIT LOW MDM: CPT | Mod: 25

## 2019-06-22 NOTE — ED PROVIDER NOTE - OBJECTIVE STATEMENT
Patient is 68yF with PMH MVR/TVR mechanical, a fib on coumadin with recent dose increase for low INR, COPD/CHF on 3L o2, htn, hld, hipolito on pm cpap presenting with bleeding from R naris since 11 pm last night, packed herself with napkin and bleeding stopped in ED triage. Not feeling weak/lightheaded.      ROS: Denies fever, palpitations, chills, recent sickness, HA, vision changes, cough, SOB, chest pain, abdominal pain, n/v/d/c, dysuria, hematuria, rash, new joint aches, sick contacts, and recent travel.

## 2019-06-22 NOTE — ED PROVIDER NOTE - PHYSICAL EXAMINATION
Gen: NAD, AOx3  Head: NCAT  HEENT: PERRL, oral mucosa moist, normal conjunctiva, no blood in nares, no focus of likely bleeding, no blood in oropharynx  Lung: CTAB, no respiratory distress  CV: rrr, no murmurs, Normal perfusion  Abd: soft, NTND, no CVA tenderness  MSK: No edema, no visible deformities  Neuro: No focal neurologic deficits  Skin: No rash   Psych: normal affect

## 2019-06-22 NOTE — ED ADULT TRIAGE NOTE - CHIEF COMPLAINT QUOTE
Pt bibems for c/o epistaxis (R Nare) since 11pm - on Coumadin, rpts recent increase in dose from 6 to 7.5mg.  States hx of similar nose bleeds while on anticoagulant. Denies trauma or injury prior to nose bleed.  Pt has bleeding control in place, denies clots or blood in back of throat. PMHx HTN - per EMS was hypertensive on arrival over 210 sbp, now improved.  Denies headache, chest pain, sob or abd pain.

## 2019-06-22 NOTE — ED PROVIDER NOTE - ATTENDING CONTRIBUTION TO CARE
agree with resident note  " 68yF with PMH MVR/TVR mechanical, a fib on coumadin with recent dose increase for low INR, COPD/CHF on 3L o2, htn, hld, hipolito on pm cpap presenting with bleeding from R naris since 11 pm last night, packed herself with napkin and bleeding stopped in ED triage. Not feeling weak/lightheaded."    PE: well appearing; VSS; not actively bleeding; no dried blood seen in nare; CTAB/L; s1 s2 no m/r/g abd soft/NT/ND ext: no edema    Imp: check INR; obs; reassess

## 2019-06-22 NOTE — ED ADULT NURSE NOTE - OBJECTIVE STATEMENT
pt received to room 13, c/o epistaxis in the right nare that began last night around 10pm while pt was watching TV. pt states she did not blow her nose or sneeze. bleeding appears controlled with gauze up right nare. pt endorses VILLA, wears 3l O2 at home. O2 applied for O2 sat in mid 80s and SOB. pt states her doctor recently increased her dose of coumadin from 6 to 7.5mg, for subtherapeutic levels.

## 2019-06-22 NOTE — ED ADULT NURSE REASSESSMENT NOTE - NS ED NURSE REASSESS COMMENT FT1
Handoff received from FREDDY Moore. Pt sitting in chair comfortably, A&OX4, pleasant, calm and cooperative. Pt denies  chest pain, n/v/d, dizziness. Respirations even and unlabored. Will continue to monitor.

## 2019-08-31 ENCOUNTER — EMERGENCY (EMERGENCY)
Facility: HOSPITAL | Age: 69
LOS: 0 days | Discharge: HOME | End: 2019-08-31
Attending: STUDENT IN AN ORGANIZED HEALTH CARE EDUCATION/TRAINING PROGRAM
Payer: MEDICARE

## 2019-08-31 VITALS
OXYGEN SATURATION: 99 % | HEART RATE: 76 BPM | TEMPERATURE: 98 F | RESPIRATION RATE: 16 BRPM | DIASTOLIC BLOOD PRESSURE: 83 MMHG | SYSTOLIC BLOOD PRESSURE: 149 MMHG

## 2019-08-31 VITALS
RESPIRATION RATE: 18 BRPM | DIASTOLIC BLOOD PRESSURE: 45 MMHG | WEIGHT: 229.94 LBS | HEIGHT: 63 IN | TEMPERATURE: 98 F | OXYGEN SATURATION: 93 % | HEART RATE: 90 BPM | SYSTOLIC BLOOD PRESSURE: 120 MMHG

## 2019-08-31 DIAGNOSIS — I48.91 UNSPECIFIED ATRIAL FIBRILLATION: ICD-10-CM

## 2019-08-31 DIAGNOSIS — I10 ESSENTIAL (PRIMARY) HYPERTENSION: ICD-10-CM

## 2019-08-31 DIAGNOSIS — R04.0 EPISTAXIS: ICD-10-CM

## 2019-08-31 DIAGNOSIS — G47.33 OBSTRUCTIVE SLEEP APNEA (ADULT) (PEDIATRIC): ICD-10-CM

## 2019-08-31 DIAGNOSIS — Z95.4 PRESENCE OF OTHER HEART-VALVE REPLACEMENT: Chronic | ICD-10-CM

## 2019-08-31 DIAGNOSIS — I50.9 HEART FAILURE, UNSPECIFIED: ICD-10-CM

## 2019-08-31 DIAGNOSIS — M10.9 GOUT, UNSPECIFIED: ICD-10-CM

## 2019-08-31 DIAGNOSIS — Z95.2 PRESENCE OF PROSTHETIC HEART VALVE: ICD-10-CM

## 2019-08-31 DIAGNOSIS — J44.9 CHRONIC OBSTRUCTIVE PULMONARY DISEASE, UNSPECIFIED: ICD-10-CM

## 2019-08-31 LAB
ALBUMIN SERPL ELPH-MCNC: 3.2 G/DL — LOW (ref 3.3–5)
ALP SERPL-CCNC: 152 U/L — HIGH (ref 40–120)
ALT FLD-CCNC: 23 U/L — SIGNIFICANT CHANGE UP (ref 12–78)
ANION GAP SERPL CALC-SCNC: 5 MMOL/L — SIGNIFICANT CHANGE UP (ref 5–17)
APTT BLD: 57.5 SEC — HIGH (ref 27.5–36.3)
AST SERPL-CCNC: 30 U/L — SIGNIFICANT CHANGE UP (ref 15–37)
BASOPHILS # BLD AUTO: 0.03 K/UL — SIGNIFICANT CHANGE UP (ref 0–0.2)
BASOPHILS NFR BLD AUTO: 0.5 % — SIGNIFICANT CHANGE UP (ref 0–2)
BILIRUB SERPL-MCNC: 0.3 MG/DL — SIGNIFICANT CHANGE UP (ref 0.2–1.2)
BUN SERPL-MCNC: 39 MG/DL — HIGH (ref 7–23)
CALCIUM SERPL-MCNC: 9.2 MG/DL — SIGNIFICANT CHANGE UP (ref 8.5–10.1)
CHLORIDE SERPL-SCNC: 101 MMOL/L — SIGNIFICANT CHANGE UP (ref 96–108)
CO2 SERPL-SCNC: 33 MMOL/L — HIGH (ref 22–31)
CREAT SERPL-MCNC: 1.32 MG/DL — HIGH (ref 0.5–1.3)
EOSINOPHIL # BLD AUTO: 0.1 K/UL — SIGNIFICANT CHANGE UP (ref 0–0.5)
EOSINOPHIL NFR BLD AUTO: 1.6 % — SIGNIFICANT CHANGE UP (ref 0–6)
GLUCOSE SERPL-MCNC: 144 MG/DL — HIGH (ref 70–99)
HCT VFR BLD CALC: 31.6 % — LOW (ref 34.5–45)
HGB BLD-MCNC: 8.9 G/DL — LOW (ref 11.5–15.5)
IMM GRANULOCYTES NFR BLD AUTO: 0.3 % — SIGNIFICANT CHANGE UP (ref 0–1.5)
INR BLD: 3.14 RATIO — HIGH (ref 0.88–1.16)
LYMPHOCYTES # BLD AUTO: 1.14 K/UL — SIGNIFICANT CHANGE UP (ref 1–3.3)
LYMPHOCYTES # BLD AUTO: 18.8 % — SIGNIFICANT CHANGE UP (ref 13–44)
MCHC RBC-ENTMCNC: 26.9 PG — LOW (ref 27–34)
MCHC RBC-ENTMCNC: 28.2 GM/DL — LOW (ref 32–36)
MCV RBC AUTO: 95.5 FL — SIGNIFICANT CHANGE UP (ref 80–100)
MONOCYTES # BLD AUTO: 0.57 K/UL — SIGNIFICANT CHANGE UP (ref 0–0.9)
MONOCYTES NFR BLD AUTO: 9.4 % — SIGNIFICANT CHANGE UP (ref 2–14)
NEUTROPHILS # BLD AUTO: 4.22 K/UL — SIGNIFICANT CHANGE UP (ref 1.8–7.4)
NEUTROPHILS NFR BLD AUTO: 69.4 % — SIGNIFICANT CHANGE UP (ref 43–77)
NRBC # BLD: 0 /100 WBCS — SIGNIFICANT CHANGE UP (ref 0–0)
PLATELET # BLD AUTO: 302 K/UL — SIGNIFICANT CHANGE UP (ref 150–400)
POTASSIUM SERPL-MCNC: 4.8 MMOL/L — SIGNIFICANT CHANGE UP (ref 3.5–5.3)
POTASSIUM SERPL-SCNC: 4.8 MMOL/L — SIGNIFICANT CHANGE UP (ref 3.5–5.3)
PROT SERPL-MCNC: 9.1 GM/DL — HIGH (ref 6–8.3)
PROTHROM AB SERPL-ACNC: 36.4 SEC — HIGH (ref 10–12.9)
RBC # BLD: 3.31 M/UL — LOW (ref 3.8–5.2)
RBC # FLD: 15.7 % — HIGH (ref 10.3–14.5)
SODIUM SERPL-SCNC: 139 MMOL/L — SIGNIFICANT CHANGE UP (ref 135–145)
WBC # BLD: 6.08 K/UL — SIGNIFICANT CHANGE UP (ref 3.8–10.5)
WBC # FLD AUTO: 6.08 K/UL — SIGNIFICANT CHANGE UP (ref 3.8–10.5)

## 2019-08-31 PROCEDURE — 30901 CONTROL OF NOSEBLEED: CPT

## 2019-08-31 PROCEDURE — 99284 EMERGENCY DEPT VISIT MOD MDM: CPT | Mod: 25

## 2019-08-31 RX ADMIN — Medication 1 APPLICATION(S): at 18:57

## 2019-08-31 NOTE — ED PROVIDER NOTE - PATIENT PORTAL LINK FT
You can access the FollowMyHealth Patient Portal offered by [facility] by registering at the following website: [email or web address]. By joining [employer]’s FollowMyHealth portal, you will also be able to view your health information using other applications (apps) compatible with our system.

## 2019-08-31 NOTE — ED ADULT NURSE NOTE - NSIMPLEMENTINTERV_GEN_ALL_ED
Implemented All Universal Safety Interventions:  [place] to call system. Call bell, personal items and telephone within reach. Instruct patient to call for assistance. Room bathroom lighting operational. Non-slip footwear when patient is off stretcher. Physically safe environment: no spills, clutter or unnecessary equipment. Stretcher in lowest position, wheels locked, appropriate side rails in place.

## 2019-08-31 NOTE — ED PROVIDER NOTE - CLINICAL SUMMARY MEDICAL DECISION MAKING FREE TEXT BOX
labs, left nostril cauterization using silver nitrate stick, no epistaxis while in the ER, pt remained stable

## 2019-08-31 NOTE — ED ADULT TRIAGE NOTE - AS TEMP SITE
Per [facility]   Imbruvica 420 mg has zero co pay.  Pt order processed and will be shipped and delivered on 5/30/18  
oral

## 2019-08-31 NOTE — ED ADULT TRIAGE NOTE - CHIEF COMPLAINT QUOTE
Nose bleed x 2 weeks from left nare, wears oxygen at home H/O COPD, NO bleeding now , EMS reports a lot of blood loss at home

## 2019-08-31 NOTE — ED ADULT NURSE NOTE - ED STAT RN HANDOFF DETAILS
Report received from FREDDY Belcher at 7pm. Assessment available on [facility]. will continue to monitor

## 2019-08-31 NOTE — ED PROVIDER NOTE - OBJECTIVE STATEMENT
69 year old female presents today c/o left nostril nosebleed at home today lasting 15min, she denies trauma and states that it was spontaneous, she is currently on coumadin for atrial fibrillation (-) dizzy or lightheaded (-) chest pain (-) sob (-)weakness

## 2019-11-27 ENCOUNTER — INPATIENT (INPATIENT)
Facility: HOSPITAL | Age: 69
LOS: 20 days | Discharge: HOME | End: 2019-12-18
Attending: INTERNAL MEDICINE | Admitting: INTERNAL MEDICINE
Payer: MEDICARE

## 2019-11-27 VITALS
TEMPERATURE: 98 F | WEIGHT: 240.08 LBS | HEIGHT: 64 IN | DIASTOLIC BLOOD PRESSURE: 56 MMHG | RESPIRATION RATE: 21 BRPM | OXYGEN SATURATION: 98 % | SYSTOLIC BLOOD PRESSURE: 137 MMHG | HEART RATE: 78 BPM

## 2019-11-27 DIAGNOSIS — Z95.4 PRESENCE OF OTHER HEART-VALVE REPLACEMENT: Chronic | ICD-10-CM

## 2019-11-27 DIAGNOSIS — I48.11 LONGSTANDING PERSISTENT ATRIAL FIBRILLATION: ICD-10-CM

## 2019-11-27 DIAGNOSIS — K92.1 MELENA: ICD-10-CM

## 2019-11-27 DIAGNOSIS — I48.91 UNSPECIFIED ATRIAL FIBRILLATION: ICD-10-CM

## 2019-11-27 DIAGNOSIS — Z95.2 PRESENCE OF PROSTHETIC HEART VALVE: ICD-10-CM

## 2019-11-27 DIAGNOSIS — M10.9 GOUT, UNSPECIFIED: ICD-10-CM

## 2019-11-27 DIAGNOSIS — K62.5 HEMORRHAGE OF ANUS AND RECTUM: ICD-10-CM

## 2019-11-27 DIAGNOSIS — G47.33 OBSTRUCTIVE SLEEP APNEA (ADULT) (PEDIATRIC): ICD-10-CM

## 2019-11-27 DIAGNOSIS — J44.9 CHRONIC OBSTRUCTIVE PULMONARY DISEASE, UNSPECIFIED: ICD-10-CM

## 2019-11-27 DIAGNOSIS — I10 ESSENTIAL (PRIMARY) HYPERTENSION: ICD-10-CM

## 2019-11-27 DIAGNOSIS — I27.20 PULMONARY HYPERTENSION, UNSPECIFIED: ICD-10-CM

## 2019-11-27 LAB
ALBUMIN SERPL ELPH-MCNC: 3.1 G/DL — LOW (ref 3.3–5)
ALLERGY+IMMUNOLOGY DIAG STUDY NOTE: SIGNIFICANT CHANGE UP
ALP SERPL-CCNC: 98 U/L — SIGNIFICANT CHANGE UP (ref 40–120)
ALT FLD-CCNC: 13 U/L — SIGNIFICANT CHANGE UP (ref 12–78)
ANION GAP SERPL CALC-SCNC: 3 MMOL/L — LOW (ref 5–17)
ANISOCYTOSIS BLD QL: SLIGHT — SIGNIFICANT CHANGE UP
APTT BLD: 53.2 SEC — HIGH (ref 27.5–36.3)
AST SERPL-CCNC: 17 U/L — SIGNIFICANT CHANGE UP (ref 15–37)
BASE EXCESS BLDA CALC-SCNC: 5.4 MMOL/L — HIGH (ref -2–2)
BASO STIPL BLD QL SMEAR: PRESENT — SIGNIFICANT CHANGE UP
BASOPHILS # BLD AUTO: 0 K/UL — SIGNIFICANT CHANGE UP (ref 0–0.2)
BASOPHILS NFR BLD AUTO: 0 % — SIGNIFICANT CHANGE UP (ref 0–2)
BILIRUB SERPL-MCNC: 0.2 MG/DL — SIGNIFICANT CHANGE UP (ref 0.2–1.2)
BLD GP AB SCN SERPL QL: SIGNIFICANT CHANGE UP
BLOOD GAS COMMENTS: SIGNIFICANT CHANGE UP
BLOOD GAS SOURCE: SIGNIFICANT CHANGE UP
BUN SERPL-MCNC: 84 MG/DL — HIGH (ref 7–23)
CALCIUM SERPL-MCNC: 8.9 MG/DL — SIGNIFICANT CHANGE UP (ref 8.5–10.1)
CHLORIDE SERPL-SCNC: 102 MMOL/L — SIGNIFICANT CHANGE UP (ref 96–108)
CK MB BLD-MCNC: <1.2 % — SIGNIFICANT CHANGE UP (ref 0–3.5)
CK MB CFR SERPL CALC: <1 NG/ML — SIGNIFICANT CHANGE UP (ref 0.5–3.6)
CK SERPL-CCNC: 81 U/L — SIGNIFICANT CHANGE UP (ref 26–192)
CO2 SERPL-SCNC: 33 MMOL/L — HIGH (ref 22–31)
CREAT SERPL-MCNC: 1.54 MG/DL — HIGH (ref 0.5–1.3)
DAT IGG-SP REAG RBC-IMP: ABNORMAL
DIGOXIN SERPL-MCNC: 1.17 NG/ML — SIGNIFICANT CHANGE UP (ref 0.8–2)
DIR ANTIGLOB POLYSPECIFIC INTERPRETATION: ABNORMAL
ELLIPTOCYTES BLD QL SMEAR: SLIGHT — SIGNIFICANT CHANGE UP
EOSINOPHIL # BLD AUTO: 0 K/UL — SIGNIFICANT CHANGE UP (ref 0–0.5)
EOSINOPHIL NFR BLD AUTO: 0 % — SIGNIFICANT CHANGE UP (ref 0–6)
GIANT PLATELETS BLD QL SMEAR: PRESENT — SIGNIFICANT CHANGE UP
GLUCOSE SERPL-MCNC: 141 MG/DL — HIGH (ref 70–99)
HCO3 BLDA-SCNC: 31 MMOL/L — HIGH (ref 21–29)
HCT VFR BLD CALC: 18.5 % — CRITICAL LOW (ref 34.5–45)
HGB BLD-MCNC: 5.3 G/DL — CRITICAL LOW (ref 11.5–15.5)
HOROWITZ INDEX BLDA+IHG-RTO: 0.5 — SIGNIFICANT CHANGE UP
HYPOCHROMIA BLD QL: SIGNIFICANT CHANGE UP
IAT COMP-SP REAG SERPL QL: ABNORMAL
INR BLD: 3.81 RATIO — HIGH (ref 0.88–1.16)
LG PLATELETS BLD QL AUTO: SLIGHT — SIGNIFICANT CHANGE UP
LYMPHOCYTES # BLD AUTO: 2.35 K/UL — SIGNIFICANT CHANGE UP (ref 1–3.3)
LYMPHOCYTES # BLD AUTO: 22 % — SIGNIFICANT CHANGE UP (ref 13–44)
MACROCYTES BLD QL: SIGNIFICANT CHANGE UP
MAGNESIUM SERPL-MCNC: 2.4 MG/DL — SIGNIFICANT CHANGE UP (ref 1.6–2.6)
MANUAL SMEAR VERIFICATION: SIGNIFICANT CHANGE UP
MCHC RBC-ENTMCNC: 28.6 GM/DL — LOW (ref 32–36)
MCHC RBC-ENTMCNC: 28.8 PG — SIGNIFICANT CHANGE UP (ref 27–34)
MCV RBC AUTO: 100.5 FL — HIGH (ref 80–100)
MONOCYTES # BLD AUTO: 0 K/UL — SIGNIFICANT CHANGE UP (ref 0–0.9)
MONOCYTES NFR BLD AUTO: 0 % — LOW (ref 2–14)
MYELOCYTES NFR BLD: 1 % — HIGH (ref 0–0)
NEUTROPHILS # BLD AUTO: 8.21 K/UL — HIGH (ref 1.8–7.4)
NEUTROPHILS NFR BLD AUTO: 77 % — SIGNIFICANT CHANGE UP (ref 43–77)
NRBC # BLD: 3 /100 — HIGH (ref 0–0)
NRBC # BLD: SIGNIFICANT CHANGE UP /100 WBCS (ref 0–0)
NT-PROBNP SERPL-SCNC: 786 PG/ML — HIGH (ref 0–125)
OB PNL STL: POSITIVE
PCO2 BLDA: 54 MMHG — HIGH (ref 32–46)
PH BLD: 7.38 — SIGNIFICANT CHANGE UP (ref 7.35–7.45)
PLAT MORPH BLD: ABNORMAL
PLATELET # BLD AUTO: 346 K/UL — SIGNIFICANT CHANGE UP (ref 150–400)
PO2 BLDA: 57 MMHG — LOW (ref 74–108)
POLYCHROMASIA BLD QL SMEAR: SIGNIFICANT CHANGE UP
POTASSIUM SERPL-MCNC: 5 MMOL/L — SIGNIFICANT CHANGE UP (ref 3.5–5.3)
POTASSIUM SERPL-SCNC: 5 MMOL/L — SIGNIFICANT CHANGE UP (ref 3.5–5.3)
PROT SERPL-MCNC: 8.1 GM/DL — SIGNIFICANT CHANGE UP (ref 6–8.3)
PROTHROM AB SERPL-ACNC: 44.5 SEC — HIGH (ref 10–12.9)
RBC # BLD: 1.84 M/UL — LOW (ref 3.8–5.2)
RBC # FLD: 16 % — HIGH (ref 10.3–14.5)
RBC BLD AUTO: ABNORMAL
SAO2 % BLDA: 88 % — LOW (ref 92–96)
SCHISTOCYTES BLD QL AUTO: SLIGHT — SIGNIFICANT CHANGE UP
SODIUM SERPL-SCNC: 138 MMOL/L — SIGNIFICANT CHANGE UP (ref 135–145)
STOMATOCYTES BLD QL SMEAR: SLIGHT — SIGNIFICANT CHANGE UP
TOXIC GRANULES BLD QL SMEAR: PRESENT — SIGNIFICANT CHANGE UP
TROPONIN I SERPL-MCNC: <.015 NG/ML — SIGNIFICANT CHANGE UP (ref 0.01–0.04)
WBC # BLD: 10.66 K/UL — HIGH (ref 3.8–10.5)
WBC # FLD AUTO: 10.66 K/UL — HIGH (ref 3.8–10.5)

## 2019-11-27 PROCEDURE — 93010 ELECTROCARDIOGRAM REPORT: CPT

## 2019-11-27 PROCEDURE — 99285 EMERGENCY DEPT VISIT HI MDM: CPT

## 2019-11-27 PROCEDURE — 71045 X-RAY EXAM CHEST 1 VIEW: CPT | Mod: 26

## 2019-11-27 PROCEDURE — 86077 PHYS BLOOD BANK SERV XMATCH: CPT

## 2019-11-27 RX ORDER — POTASSIUM CHLORIDE 20 MEQ
20 PACKET (EA) ORAL DAILY
Refills: 0 | Status: DISCONTINUED | OUTPATIENT
Start: 2019-11-27 | End: 2019-12-18

## 2019-11-27 RX ORDER — PANTOPRAZOLE SODIUM 20 MG/1
8 TABLET, DELAYED RELEASE ORAL
Qty: 80 | Refills: 0 | Status: DISCONTINUED | OUTPATIENT
Start: 2019-11-27 | End: 2019-11-29

## 2019-11-27 RX ORDER — ALLOPURINOL 300 MG
100 TABLET ORAL
Refills: 0 | Status: DISCONTINUED | OUTPATIENT
Start: 2019-11-27 | End: 2019-12-18

## 2019-11-27 RX ORDER — IPRATROPIUM BROMIDE 0.2 MG/ML
500 SOLUTION, NON-ORAL INHALATION EVERY 6 HOURS
Refills: 0 | Status: DISCONTINUED | OUTPATIENT
Start: 2019-11-27 | End: 2019-12-17

## 2019-11-27 RX ORDER — SIMVASTATIN 20 MG/1
5 TABLET, FILM COATED ORAL AT BEDTIME
Refills: 0 | Status: DISCONTINUED | OUTPATIENT
Start: 2019-11-27 | End: 2019-12-18

## 2019-11-27 RX ORDER — PANTOPRAZOLE SODIUM 20 MG/1
40 TABLET, DELAYED RELEASE ORAL ONCE
Refills: 0 | Status: COMPLETED | OUTPATIENT
Start: 2019-11-27 | End: 2019-11-27

## 2019-11-27 RX ORDER — SODIUM CHLORIDE 9 MG/ML
500 INJECTION INTRAMUSCULAR; INTRAVENOUS; SUBCUTANEOUS ONCE
Refills: 0 | Status: COMPLETED | OUTPATIENT
Start: 2019-11-27 | End: 2019-11-27

## 2019-11-27 RX ORDER — DIGOXIN 250 MCG
0.12 TABLET ORAL DAILY
Refills: 0 | Status: DISCONTINUED | OUTPATIENT
Start: 2019-11-27 | End: 2019-12-18

## 2019-11-27 RX ORDER — METOPROLOL TARTRATE 50 MG
25 TABLET ORAL DAILY
Refills: 0 | Status: DISCONTINUED | OUTPATIENT
Start: 2019-11-27 | End: 2019-12-18

## 2019-11-27 RX ORDER — FUROSEMIDE 40 MG
40 TABLET ORAL ONCE
Refills: 0 | Status: DISCONTINUED | OUTPATIENT
Start: 2019-11-27 | End: 2019-11-27

## 2019-11-27 RX ORDER — FUROSEMIDE 40 MG
40 TABLET ORAL DAILY
Refills: 0 | Status: DISCONTINUED | OUTPATIENT
Start: 2019-11-28 | End: 2019-12-18

## 2019-11-27 RX ADMIN — SODIUM CHLORIDE 500 MILLILITER(S): 9 INJECTION INTRAMUSCULAR; INTRAVENOUS; SUBCUTANEOUS at 09:40

## 2019-11-27 RX ADMIN — Medication 100 MILLIGRAM(S): at 17:50

## 2019-11-27 RX ADMIN — Medication 0.12 MILLIGRAM(S): at 14:33

## 2019-11-27 RX ADMIN — PANTOPRAZOLE SODIUM 10 MG/HR: 20 TABLET, DELAYED RELEASE ORAL at 09:39

## 2019-11-27 RX ADMIN — Medication 25 MILLIGRAM(S): at 14:36

## 2019-11-27 RX ADMIN — PANTOPRAZOLE SODIUM 40 MILLIGRAM(S): 20 TABLET, DELAYED RELEASE ORAL at 09:38

## 2019-11-27 RX ADMIN — PANTOPRAZOLE SODIUM 10 MG/HR: 20 TABLET, DELAYED RELEASE ORAL at 21:45

## 2019-11-27 NOTE — ED PROVIDER NOTE - OBJECTIVE STATEMENT
69 years old female by ems c/o progressively sob dizziness for two weeks unable to walk about 1/2 block increases sob to laid down and decreases to sit up. Pt sts she has hx of chf. Pt denies recent hx of trauma, long traveling, headache, blurred visions, light sensitivities, focal/distal weakness or numbness, cough, chest pain, nausea, vomiting, fever, chills, abd, dysuria, hematuria, vaginal spotting or discharge, abnormal stool color. 69 years old female by ems c/o progressively sob dizziness for two weeks unable to walk about 1/2 block increases sob to laid down and decreases to sit up. Pt sts she has hx of chf. Pt denies recent hx of trauma, long traveling, headache, blurred visions, light sensitivities, focal/distal weakness or numbness, cough, chest pain, nausea, vomiting, fever, chills, abd, dysuria, hematuria, vaginal spotting or discharge. Pt also c/o black stool in the past one to two weeks. and sts she stopped taking iron pills three weeks ago.

## 2019-11-27 NOTE — H&P ADULT - NSICDXFAMILYHX_GEN_ALL_CORE_FT
FAMILY HISTORY:  Family history of stroke    Father  Still living? Unknown  Family history of hypertension, Age at diagnosis: Age Unknown    Mother  Still living? Unknown  Family history of hypertension, Age at diagnosis: Age Unknown    Sibling  Still living? Unknown  Family history of hypertension, Age at diagnosis: Age Unknown

## 2019-11-27 NOTE — H&P ADULT - NSICDXPASTSURGICALHX_GEN_ALL_CORE_FT
PAST SURGICAL HISTORY:  History of mitral valve replacement with mechanical valve     Tricuspid valve replaced mechanical

## 2019-11-27 NOTE — ED PROVIDER NOTE - PROGRESS NOTE DETAILS
Dr. Mccrary cover's Dr. Plunkett pt's cardiologist is here eval and admit pt. Dr. Newman gastroenterologist service is called. Pt now is on 3 liter oxygen and speaking in clear full sentences smiling no distress. pt's abd is soft nondistended non tender to palp again. no active bleeding no clots from the rectum. Pt's cardiologist Dr. Manning sts no need to reverse the INR just give prbc and protonic drip. Dr. Mccrary is called and notified about all tests.

## 2019-11-27 NOTE — CONSULT NOTE ADULT - PROBLEM SELECTOR RECOMMENDATION 9
-Protonix 8 ml/hr gtt  -Holding coumadin   -Trend INR daily   -IV hydration   -She will need upper endoscopy for further evaluation (goal < 2.5 if endoscopic hemostasis is required); will require cardiac clearance prior to endoscopic evaluation given extensive cardiac history   -Trend Hg q8h, transfuse < 7

## 2019-11-27 NOTE — ED ADULT NURSE NOTE - NSIMPLEMENTINTERV_GEN_ALL_ED
Implemented All Fall Risk Interventions:  [place] to call system. Call bell, personal items and telephone within reach. Instruct patient to call for assistance. Room bathroom lighting operational. Non-slip footwear when patient is off stretcher. Physically safe environment: no spills, clutter or unnecessary equipment. Stretcher in lowest position, wheels locked, appropriate side rails in place. Provide visual cue, wrist band, yellow gown, etc. Monitor gait and stability. Monitor for mental status changes and reorient to person, place, and time. Review medications for side effects contributing to fall risk. Reinforce activity limits and safety measures with patient and family.

## 2019-11-27 NOTE — ED PROVIDER NOTE - NONTENDER LOCATION
right upper quadrant/right lower quadrant/right costovertebral angle/left upper quadrant/umbilical/left lower quadrant/periumbilical/suprapubic/left costovertebral angle

## 2019-11-27 NOTE — H&P ADULT - NSHPPHYSICALEXAM_GEN_ALL_CORE
GENERAL: NAD, well-groomed, well-developed. OBese  HEAD:  Atraumatic, Normocephalic  EYES: EOMI, PERRL, conjunctiva and sclera clear  ENMT:  Moist mucous membranes, Good dentition, No lesions  NECK: Supple, No JVD, Normal thyroid  NERVOUS SYSTEM:  Alert & Oriented X3, Good concentration; Motor Strength 4/5 B/L upper and lower extremities;   CHEST/LUNG: Clear to percussion bilaterally; No rales, rhonchi, wheezing, or rubs  HEART: Regular rate and rhythm; No murmurs, rubs, or gallops  ABDOMEN: Soft, Nontender, Nondistended; Bowel sounds present  EXTREMITIES:  2+ Peripheral Pulses, No clubbing, cyanosis, or edema  LYMPH: No lymphadenopathy noted  SKIN: No rashes or lesions      Vital Signs Last 24 Hrs  T(C): 36.9 (27 Nov 2019 10:38), Max: 36.9 (27 Nov 2019 10:38)  T(F): 98.5 (27 Nov 2019 10:38), Max: 98.5 (27 Nov 2019 10:38)  HR: 72 (27 Nov 2019 10:38) (72 - 78)  BP: 116/61 (27 Nov 2019 10:38) (116/61 - 137/56)  BP(mean): --  RR: 22 (27 Nov 2019 10:38) (21 - 22)  SpO2: 98% (27 Nov 2019 10:38) (98% - 98%)

## 2019-11-27 NOTE — CONSULT NOTE ADULT - ASSESSMENT
69 AAM with PMHx MVR, TVR, afib on coumadin, HTN, CHF, COPD on home O2, pulmonary hypertension, morbid obesity who presents to the hospital with symptomatic anemia secondary to overt GI bleeding (melena), likely upper GI source.     VSS on presentation. Labs reveal Hg 5.3, BUN 84, Cr 1.54, INR 3.8     Likely upper GI bleeding in the setting of melena, anemia, elevated BUN. Differential includes PUD, AVM, dieulafoy, esophagitis/gastritis.   **Given comorbidities she is very high risk for sedation; would advise for cardiac clearance prior to endoscopic evaluation

## 2019-11-27 NOTE — H&P ADULT - HISTORY OF PRESENT ILLNESS
· HPI : 69 years old female by ems c/o progressively sob dizziness for two weeks unable to walk about 1/2 block increases sob to laid down and decreases to sit up. Pt sts she has hx of chf. Pt denies recent hx of trauma, long traveling, headache, blurred visions, light sensitivities, focal/distal weakness or numbness, cough, chest pain, nausea, vomiting, fever, chills, abd, dysuria, hematuria, vaginal spotting or discharge. Pt also c/o black stool in the past one to two weeks. and sts she stopped taking iron pills three weeks ago.

## 2019-11-27 NOTE — H&P ADULT - NSICDXPASTMEDICALHX_GEN_ALL_CORE_FT
PAST MEDICAL HISTORY:  Atrial fibrillation S/P Ablation    CHF (congestive heart failure)     COPD (chronic obstructive pulmonary disease) on home O2    Gout     HTN (hypertension)     Mitral valve replaced     MIGUEL (obstructive sleep apnea)     Pulmonary hypertension     Tricuspid valve replaced

## 2019-11-27 NOTE — ED PROVIDER NOTE - CONSULTING PHYSICIAN
Dr. Manning pt's cardiologist is notified Dr. Potter gastroenterologist covers Dr. Newman is notified Dr. Elizabeth pulmonologist is notified

## 2019-11-27 NOTE — CONSULT NOTE ADULT - SUBJECTIVE AND OBJECTIVE BOX
Chief Complaint:  Patient is a 69y old  Female who presents with a chief complaint of Lethargy, SOB (27 Nov 2019 11:58)      HPI:  · HPI : 69 years old female by ems c/o progressively sob dizziness for two weeks unable to walk about 1/2 block increases sob to laid down and decreases to sit up. Pt sts she has hx of chf. Pt denies recent hx of trauma, long traveling, headache, blurred visions, light sensitivities, focal/distal weakness or numbness, cough, chest pain, nausea, vomiting, fever, chills, abd, dysuria, hematuria, vaginal spotting or discharge. Pt also c/o black stool in the past one to two weeks. and sts she stopped taking iron pills three weeks ago. (27 Nov 2019 11:58). GI consulted for evaluation. She reports last colonoscopy a few years ago (not sure GI and believes results were normal). Denies any previous upper endoscopy. DEnies any recent NSAID use or ETOH abuse. Denies any previous history of GI bleeding. Denies any new medications, change in diet, infections.       PMH/PSH:PAST MEDICAL & SURGICAL HISTORY:  Tricuspid valve replaced  Mitral valve replaced  Gout  HTN (hypertension)  CHF (congestive heart failure)  COPD (chronic obstructive pulmonary disease): on home O2  MIGUEL (obstructive sleep apnea)  Pulmonary hypertension  Atrial fibrillation: S/P Ablation  Tricuspid valve replaced: mechanical  History of mitral valve replacement with mechanical valve      Allergies:  No Known Allergies      Medications:  allopurinol 100 milliGRAM(s) Oral two times a day  digoxin     Tablet 0.125 milliGRAM(s) Oral daily  furosemide    Tablet 40 milliGRAM(s) Oral daily  metoprolol succinate ER 25 milliGRAM(s) Oral daily  pantoprazole Infusion 8 mG/Hr IV Continuous <Continuous>  potassium chloride    Tablet ER 20 milliEquivalent(s) Oral daily  simvastatin 5 milliGRAM(s) Oral at bedtime      Review of Systems:    General:  No weight loss, fevers, chills; + fatigue   Eyes:  Good vision, no reported pain  ENT:  No sore throat, pain, runny nose, dysphagia  CV:  No pain, palpitations, hypo/hypertension  Resp:  No dyspnea, cough, tachypnea, wheezing  GI:  as per HPI   :  No pain, bleeding, incontinence, nocturia  Muscle:  No pain, weakness  Breast:  No pain, abscess, mass, discharge  Neuro:  No weakness, tingling, memory problems  Psych:  No fatigue, insomnia, mood problems, depression  Endocrine:  No polyuria, polydipsia, cold/heat intolerance  Heme:  No petechiae, ecchymosis, easy bruisability  Skin:  No rash, tattoos, scars, edema    Relevant Family History:   FAMILY HISTORY:  Family history of hypertension (Mother)  Family history of stroke  Family history of hypertension (Father, Sibling)      Relevant Social History: Alcohol ( -) , Tobacco ( -) , Illicit drugs (- )     Physical Exam:    Vital Signs:  Vital Signs Last 24 Hrs  T(C): 36.9 (27 Nov 2019 10:38), Max: 36.9 (27 Nov 2019 10:38)  T(F): 98.5 (27 Nov 2019 10:38), Max: 98.5 (27 Nov 2019 10:38)  HR: 77 (27 Nov 2019 14:35) (72 - 78)  BP: 122/60 (27 Nov 2019 14:35) (116/61 - 137/56)  BP(mean): --  RR: 22 (27 Nov 2019 10:38) (21 - 22)  SpO2: 98% (27 Nov 2019 10:38) (98% - 98%)  Daily Height in cm: 162.56 (27 Nov 2019 08:32)    Daily     General:  Appears stated age, obese   HEENT:  NC/AT,  conjunctivae clear and pink, no thyromegaly, nodules, adenopathy, no JVD, anicteric sclera  Chest:  Full & symmetric excursion, no increased effort, breath sounds clear  Cardiovascular:  Regular rhythm, S1, S2, no murmur/rub/S3/S4, no abdominal bruit, no edema  Abdomen:  Soft, non tender, non distended, normoactive bowel sounds,  no masses , no hepatosplenomegaly, no signs of chronic liver disease  Extremities:  no cyanosis, clubbing or edema  Skin:  No rash/erythema/ecchymoses/petechiae/wounds/abscess/warm/dry  Neuro/Psych:  Alert, oriented, no asterixis, no tremor, no encephalopathy  Rectal: deferred     Laboratory:                          5.3    10.66 )-----------( 346      ( 27 Nov 2019 08:51 )             18.5     11-27    138  |  102  |  84<H>  ----------------------------<  141<H>  5.0   |  33<H>  |  1.54<H>    Ca    8.9      27 Nov 2019 08:51  Mg     2.4     11-27    TPro  8.1  /  Alb  3.1<L>  /  TBili  0.2  /  DBili  x   /  AST  17  /  ALT  13  /  AlkPhos  98  11-27    LIVER FUNCTIONS - ( 27 Nov 2019 08:51 )  Alb: 3.1 g/dL / Pro: 8.1 gm/dL / ALK PHOS: 98 U/L / ALT: 13 U/L / AST: 17 U/L / GGT: x           PT/INR - ( 27 Nov 2019 08:51 )   PT: 44.5 sec;   INR: 3.81 ratio         PTT - ( 27 Nov 2019 08:51 )  PTT:53.2 sec        Intake and Output      Imaging:

## 2019-11-27 NOTE — PATIENT PROFILE ADULT - ANY SIGNIFICANT CHANGE IN ABILITY TO PERFORM THE FOLLOWING ADL SINCE THE ONSET OF PRESENT ILLNESS?
Denial letter received to office. Was denied due to : unable to determine if TB test was completed or if patient is being treated.   QFT results from 8/7/2019 faxed to [employer] at [phone] to avoid an appeal. no

## 2019-11-27 NOTE — CONSULT NOTE ADULT - SUBJECTIVE AND OBJECTIVE BOX
Patient is a 69y old  Female who presents with a chief complaint of Lethargy, SOB (27 Nov 2019 15:35)    HPI: 69 year female with HTN, A fib, Mitral & Tricuspid valve replacement, CHF, COPD(never smoked) on home O2, Obesity, MIGUEL(did not tolerate CPAP), pHTN, Gout  Brought by EMS c/o progressively sob,  dizziness for two weeks,  unable to walk about 1/2 block. Increases  sob to laid down and decreases to sit up. Denies cough, chest pain, nausea, vomiting, fever, chills, abd. Also c/o black stool in the past one to two weeks, stopped taking iron pills three weeks ago.  Never smoked.    PAST MEDICAL & SURGICAL HISTORY:  Tricuspid valve replaced  Mitral valve replaced  Gout  HTN (hypertension)  CHF (congestive heart failure)  COPD (chronic obstructive pulmonary disease): on home O2  MIGUEL (obstructive sleep apnea)  Pulmonary hypertension  Atrial fibrillation: S/P Ablation  Tricuspid valve replaced: mechanical  History of mitral valve replacement with mechanical valve    FAMILY HISTORY:  Family history of hypertension (Mother)  Family history of stroke  Family history of hypertension (Father, Sibling)    SOCIAL HISTORY: BMI (kg/m2): 41.2 (11-27 @ 08:32)    Allergies  No Known Allergies    MEDICATIONS  (STANDING):  allopurinol 100 milliGRAM(s) Oral two times a day  digoxin     Tablet 0.125 milliGRAM(s) Oral daily  furosemide    Tablet 40 milliGRAM(s) Oral daily  metoprolol succinate ER 25 milliGRAM(s) Oral daily  pantoprazole Infusion 8 mG/Hr (10 mL/Hr) IV Continuous <Continuous>  potassium chloride    Tablet ER 20 milliEquivalent(s) Oral daily  simvastatin 5 milliGRAM(s) Oral at bedtime    REVIEW OF SYSTEMS:    Constitutional:            No fever, weight loss or fatigue  HEENT:                      No difficulty hearing, tinnitus, vertigo; No sinus or throat pain  Respiratory:                sob  Cardiovascular:           No chest pain, palpitations  Gastrointestinal:        No abdominal or epigastric pain. No N/V/diarrhea or hematemesis  Genitourinary:            No dysuria, frequency, hematuria or incontinence  SKIN:                             no rash  Musculoskeletal:        No joint pain or swelling  Extremities:                swelling  Neurological:              No headaches  Psychiatric:                 No depression, anxiety    MACRA & MIPS:   Vaccines - Influenza: no and Pneumovax: no  Tobacco: no  Blood Pressure Screening / Control of: 137/56  Current Medications Reviewed: yes    Vital Signs Last 24 Hrs  T(C): 36.8 (27 Nov 2019 18:42), Max: 37.1 (27 Nov 2019 16:22)  T(F): 98.3 (27 Nov 2019 18:42), Max: 98.7 (27 Nov 2019 16:22)  HR: 70 (27 Nov 2019 18:42) (67 - 78)  BP: 115/65 (27 Nov 2019 18:42) (115/65 - 137/56)  BP(mean): --  RR: 20 (27 Nov 2019 18:42) (20 - 22)  SpO2: 100% (27 Nov 2019 18:42) (98% - 100%)    PHYSICAL EXAM:  GEN:         Awake, responsive and comfortable.  HEENT:    Normal.    RESP:        no wheezing  CVS:             Regular rate and rhythm.   ABD:         Soft, non-tender, non-distended;   :             No costovertebral angle tenderness  SKIN:           Warm and dry.  EXTR:           edema  CNS:            Intact sensory and motor function.  PSYCH:        cooperative, no anxiety or depression    LABS:                        5.3    10.66 )-----------( 346      ( 27 Nov 2019 08:51 )             18.5     11-27    138  |  102  |  84<H>  ----------------------------<  141<H>  5.0   |  33<H>  |  1.54<H>    Ca    8.9      27 Nov 2019 08:51  Mg     2.4     11-27    TPro  8.1  /  Alb  3.1<L>  /  TBili  0.2  /  DBili  x   /  AST  17  /  ALT  13  /  AlkPhos  98  11-27    PT/INR - ( 27 Nov 2019 08:51 )   PT: 44.5 sec;   INR: 3.81 ratio      PTT - ( 27 Nov 2019 08:51 )  PTT:53.2 sec  11-27 @ 09:20  pH: 7.38  pCO2: 54  pO2: 57  SaO2: 88    EKG: A fib    RADIOLOGY & ADDITIONAL STUDIES:  < from: Xray Chest 1 View-PORTABLE IMMEDIATE (11.27.19 @ 09:19) >    EXAM:  XR CHEST PORTABLE IMMED 1V                          PROCEDURE DATE:  11/27/2019      INTERPRETATION:  AP erect chest on November 27, 2019 at 8:49 AM. Patient   is short of breath. There is history of CHF and COPD.    Gross heart enlargement, ectatic aorta, and sternotomy, and prosthetic   heart valve again noted.    There is a mild to moderate congestive picture in the lungs.    Findings are similar to October 1, 2015.    IMPRESSION: Heart enlargement, heart surgery, and mild congestion again   noted.    RENETTA OCAMPO M.D., ATTENDING RADIOLOGIST  This document has been electronically signed. Nov 27 2019 10:00AM      ASSESSMENT AND PLAN:  ·	SOB, multifactorial.  ·	Chronic hypoxic hypercarbic Respiratory failure.  ·	CHF by history.  ·	pHTN.  ·	Atrial Fibrillation.  ·	S/P Mitral and Tricuspid Valve replacement.  ·	Severe Anemia.  ·	Suspect GI bleed.  ·	Renal Insuffiencey.  ·	Obesity.  ·	MIGUEL, did not tolerate CPAP.    Continue O2 and diuretics.  Add nebulizer.  PRBC transfusion.

## 2019-11-27 NOTE — ED PROVIDER NOTE - MUSCULOSKELETAL, MLM
Spine appears normal, range of motion is not limited, no muscle or joint tenderness No calfs tender bilaterally

## 2019-11-27 NOTE — ED PROVIDER NOTE - CONSTITUTIONAL, MLM
normal... Well appearing, awake, alert, oriented to person, place, time/situation and in no apparent distress. Speaking in clear full sentences + nasal flaring no shoulders retractions no diaphoresis, smiling while sitting up in the stretcher in a bright light room.

## 2019-11-27 NOTE — H&P ADULT - ASSESSMENT
IMPROVE VTE Individual Risk Assessment    RISK                                                                Points    [  ] Previous VTE                                                  3    [  ] Thrombophilia                                               2    [  ] Lower limb paralysis                                      2        (unable to hold up >15 seconds)      [  ] Current Cancer                                              2         (within 6 months)    [x  ] Immobilization > 24 hrs                                1    [  ] ICU/CCU stay > 24 hours                              1    [x  ] Age > 60                                                      1    IMPROVE VTE Score _2________    IMPROVE Score 0-1: Low Risk, No VTE prophylaxis required for most patients, encourage ambulation.   IMPROVE Score 2-3: At risk, pharmacologic VTE prophylaxis is indicated for most patients (in the absence of a contraindication)  IMPROVE Score > or = 4: High Risk, pharmacologic VTE prophylaxis is indicated for most patients (in the absence of a contraindication)    No Phar. A/C 2* GIB

## 2019-11-27 NOTE — H&P ADULT - NSHPLABSRESULTS_GEN_ALL_CORE
5.3    10.66 )-----------( 346      ( 27 Nov 2019 08:51 )             18.5     11-27    138  |  102  |  84<H>  ----------------------------<  141<H>  5.0   |  33<H>  |  1.54<H>    Ca    8.9      27 Nov 2019 08:51  Mg     2.4     11-27    TPro  8.1  /  Alb  3.1<L>  /  TBili  0.2  /  DBili  x   /  AST  17  /  ALT  13  /  AlkPhos  98  11-27    PT/INR - ( 27 Nov 2019 08:51 )   PT: 44.5 sec;   INR: 3.81 ratio         PTT - ( 27 Nov 2019 08:51 )  PTT:53.2 sec    CAPILLARY BLOOD GLUCOSE        ABG - ( 27 Nov 2019 09:20 )  pH, Arterial: x     pH, Blood: 7.38  /  pCO2: 54    /  pO2: 57    / HCO3: 31    / Base Excess: 5.4   /  SaO2: 88                CARDIAC MARKERS ( 27 Nov 2019 08:51 )  <.015 ng/mL / x     / 81 U/L / x     / <1.0 ng/mL        Urine Culture:      Blood Culture:    < from: Xray Chest 1 View-PORTABLE IMMEDIATE (11.27.19 @ 09:19) >    Findings are similar to October 1, 2015.    IMPRESSION: Heart enlargement, heart surgery, and mild congestion again   noted.      < end of copied text >

## 2019-11-27 NOTE — ED ADULT TRIAGE NOTE - CHIEF COMPLAINT QUOTE
ems states , " Pt had been c/o sob , that was worsening this morning , pt has been  ill, c/o dizziness for approx 2 weeks, hx afib, and chf "

## 2019-11-28 LAB
ANION GAP SERPL CALC-SCNC: 3 MMOL/L — LOW (ref 5–17)
ANISOCYTOSIS BLD QL: SIGNIFICANT CHANGE UP
ANTIBODY INTERPRETATION 2: SIGNIFICANT CHANGE UP
BASOPHILS # BLD AUTO: 0.12 K/UL — SIGNIFICANT CHANGE UP (ref 0–0.2)
BASOPHILS NFR BLD AUTO: 1 % — SIGNIFICANT CHANGE UP (ref 0–2)
BUN SERPL-MCNC: 80 MG/DL — HIGH (ref 7–23)
CALCIUM SERPL-MCNC: 8.6 MG/DL — SIGNIFICANT CHANGE UP (ref 8.5–10.1)
CHLORIDE SERPL-SCNC: 103 MMOL/L — SIGNIFICANT CHANGE UP (ref 96–108)
CO2 SERPL-SCNC: 32 MMOL/L — HIGH (ref 22–31)
CREAT SERPL-MCNC: 1.79 MG/DL — HIGH (ref 0.5–1.3)
EOSINOPHIL # BLD AUTO: 0.23 K/UL — SIGNIFICANT CHANGE UP (ref 0–0.5)
EOSINOPHIL NFR BLD AUTO: 2 % — SIGNIFICANT CHANGE UP (ref 0–6)
GLUCOSE BLDC GLUCOMTR-MCNC: 135 MG/DL — HIGH (ref 70–99)
GLUCOSE SERPL-MCNC: 107 MG/DL — HIGH (ref 70–99)
HCT VFR BLD CALC: 21.6 % — LOW (ref 34.5–45)
HCT VFR BLD CALC: 22.3 % — LOW (ref 34.5–45)
HGB BLD-MCNC: 6.5 G/DL — CRITICAL LOW (ref 11.5–15.5)
HGB BLD-MCNC: 6.7 G/DL — CRITICAL LOW (ref 11.5–15.5)
HYPOCHROMIA BLD QL: SIGNIFICANT CHANGE UP
INR BLD: 2.89 RATIO — HIGH (ref 0.88–1.16)
LG PLATELETS BLD QL AUTO: SIGNIFICANT CHANGE UP
LYMPHOCYTES # BLD AUTO: 1.62 K/UL — SIGNIFICANT CHANGE UP (ref 1–3.3)
LYMPHOCYTES # BLD AUTO: 14 % — SIGNIFICANT CHANGE UP (ref 13–44)
MACROCYTES BLD QL: SIGNIFICANT CHANGE UP
MANUAL SMEAR VERIFICATION: SIGNIFICANT CHANGE UP
MCHC RBC-ENTMCNC: 28.4 PG — SIGNIFICANT CHANGE UP (ref 27–34)
MCHC RBC-ENTMCNC: 28.6 PG — SIGNIFICANT CHANGE UP (ref 27–34)
MCHC RBC-ENTMCNC: 30 GM/DL — LOW (ref 32–36)
MCHC RBC-ENTMCNC: 30.1 GM/DL — LOW (ref 32–36)
MCV RBC AUTO: 94.3 FL — SIGNIFICANT CHANGE UP (ref 80–100)
MCV RBC AUTO: 95.3 FL — SIGNIFICANT CHANGE UP (ref 80–100)
MONOCYTES # BLD AUTO: 0.92 K/UL — HIGH (ref 0–0.9)
MONOCYTES NFR BLD AUTO: 8 % — SIGNIFICANT CHANGE UP (ref 2–14)
NEUTROPHILS # BLD AUTO: 8.09 K/UL — HIGH (ref 1.8–7.4)
NEUTROPHILS NFR BLD AUTO: 67 % — SIGNIFICANT CHANGE UP (ref 43–77)
NEUTS BAND # BLD: 3 % — SIGNIFICANT CHANGE UP (ref 0–8)
NRBC # BLD: 1 /100 — HIGH (ref 0–0)
NRBC # BLD: 2 /100 WBCS — HIGH (ref 0–0)
NRBC # BLD: SIGNIFICANT CHANGE UP /100 WBCS (ref 0–0)
OVALOCYTES BLD QL SMEAR: SLIGHT — SIGNIFICANT CHANGE UP
PLAT MORPH BLD: ABNORMAL
PLATELET # BLD AUTO: 322 K/UL — SIGNIFICANT CHANGE UP (ref 150–400)
PLATELET # BLD AUTO: 333 K/UL — SIGNIFICANT CHANGE UP (ref 150–400)
POLYCHROMASIA BLD QL SMEAR: SLIGHT — SIGNIFICANT CHANGE UP
POTASSIUM SERPL-MCNC: 5 MMOL/L — SIGNIFICANT CHANGE UP (ref 3.5–5.3)
POTASSIUM SERPL-SCNC: 5 MMOL/L — SIGNIFICANT CHANGE UP (ref 3.5–5.3)
PROTHROM AB SERPL-ACNC: 33.5 SEC — HIGH (ref 10–12.9)
RBC # BLD: 2.29 M/UL — LOW (ref 3.8–5.2)
RBC # BLD: 2.34 M/UL — LOW (ref 3.8–5.2)
RBC # FLD: 17.9 % — HIGH (ref 10.3–14.5)
RBC # FLD: 18.3 % — HIGH (ref 10.3–14.5)
RBC BLD AUTO: ABNORMAL
SODIUM SERPL-SCNC: 138 MMOL/L — SIGNIFICANT CHANGE UP (ref 135–145)
TARGETS BLD QL SMEAR: SLIGHT — SIGNIFICANT CHANGE UP
VARIANT LYMPHS # BLD: 5 % — SIGNIFICANT CHANGE UP (ref 0–6)
WBC # BLD: 11.55 K/UL — HIGH (ref 3.8–10.5)
WBC # BLD: 12.71 K/UL — HIGH (ref 3.8–10.5)
WBC # FLD AUTO: 11.55 K/UL — HIGH (ref 3.8–10.5)
WBC # FLD AUTO: 12.71 K/UL — HIGH (ref 3.8–10.5)

## 2019-11-28 RX ORDER — SODIUM CHLORIDE 0.65 %
1 AEROSOL, SPRAY (ML) NASAL
Refills: 0 | Status: DISCONTINUED | OUTPATIENT
Start: 2019-11-28 | End: 2019-12-18

## 2019-11-28 RX ORDER — SODIUM CHLORIDE 9 MG/ML
1000 INJECTION INTRAMUSCULAR; INTRAVENOUS; SUBCUTANEOUS
Refills: 0 | Status: COMPLETED | OUTPATIENT
Start: 2019-11-28 | End: 2019-11-28

## 2019-11-28 RX ADMIN — Medication 25 MILLIGRAM(S): at 06:17

## 2019-11-28 RX ADMIN — Medication 500 MICROGRAM(S): at 23:31

## 2019-11-28 RX ADMIN — SIMVASTATIN 5 MILLIGRAM(S): 20 TABLET, FILM COATED ORAL at 00:03

## 2019-11-28 RX ADMIN — Medication 20 MILLIEQUIVALENT(S): at 11:49

## 2019-11-28 RX ADMIN — Medication 40 MILLIGRAM(S): at 06:17

## 2019-11-28 RX ADMIN — PANTOPRAZOLE SODIUM 10 MG/HR: 20 TABLET, DELAYED RELEASE ORAL at 06:17

## 2019-11-28 RX ADMIN — Medication 500 MICROGRAM(S): at 17:09

## 2019-11-28 RX ADMIN — Medication 1 SPRAY(S): at 22:26

## 2019-11-28 RX ADMIN — Medication 100 MILLIGRAM(S): at 06:17

## 2019-11-28 RX ADMIN — Medication 100 MILLIGRAM(S): at 17:00

## 2019-11-28 RX ADMIN — SODIUM CHLORIDE 75 MILLILITER(S): 9 INJECTION INTRAMUSCULAR; INTRAVENOUS; SUBCUTANEOUS at 04:00

## 2019-11-28 RX ADMIN — SIMVASTATIN 5 MILLIGRAM(S): 20 TABLET, FILM COATED ORAL at 22:26

## 2019-11-28 RX ADMIN — Medication 500 MICROGRAM(S): at 00:32

## 2019-11-28 RX ADMIN — Medication 0.12 MILLIGRAM(S): at 06:17

## 2019-11-28 RX ADMIN — Medication 500 MICROGRAM(S): at 06:19

## 2019-11-28 RX ADMIN — Medication 500 MICROGRAM(S): at 11:38

## 2019-11-28 NOTE — PROGRESS NOTE ADULT - ASSESSMENT
69 AAM with PMHx MVR, TVR, afib on coumadin, HTN, CHF, COPD on home O2, pulmonary hypertension, morbid obesity who presents to the hospital with symptomatic anemia secondary to overt GI bleeding (melena), likely upper GI source.     VSS on presentation. Labs reveal Hg 5.3, BUN 84, Cr 1.54, INR 3.8. Currently recieving transfusion, repeat Hg not yet sent.     Likely upper GI bleeding in the setting of melena, anemia, elevated BUN. Differential includes PUD, AVM, dieulafoy, esophagitis/gastritis.   **Given comorbidities she is very high risk for sedation; would advise for cardiac clearance prior to endoscopic evaluation

## 2019-11-28 NOTE — PROGRESS NOTE ADULT - SUBJECTIVE AND OBJECTIVE BOX
INTERVAL HPI:   69 year female with HTN, A fib, Mitral & Tricuspid valve replacement, CHF, COPD(never smoked) on home O2, Obesity, MIGUEL(did not tolerate CPAP), pHTN, Gout  Brought by EMS c/o progressively sob,  dizziness for two weeks,  unable to walk about 1/2 block. Increases  sob to laid down and decreases to sit up. Denies cough, chest pain, nausea, vomiting, fever, chills, abd. Also c/o black stool in the past one to two weeks, stopped taking iron pills three weeks ago.  Never smoked.    OVERNIGHT EVENTS:  Feels better.    Vital Signs Last 24 Hrs  T(C): 36.2 (28 Nov 2019 10:46), Max: 36.9 (28 Nov 2019 05:03)  T(F): 97.2 (28 Nov 2019 10:46), Max: 98.4 (28 Nov 2019 05:03)  HR: 90 (28 Nov 2019 12:03) (66 - 92)  BP: 144/63 (28 Nov 2019 10:46) (115/65 - 144/63)  BP(mean): --  RR: 18 (28 Nov 2019 10:46) (18 - 20)  SpO2: 98% (28 Nov 2019 12:03) (96% - 100%)    PHYSICAL EXAM:  GEN:         Awake, responsive and comfortable.  HEENT:    Normal.    RESP:        no wheezing.  CVS:          Regular rate and rhythm.   ABD:         Soft, non-tender, non-distended;     MEDICATIONS  (STANDING):  allopurinol 100 milliGRAM(s) Oral two times a day  digoxin     Tablet 0.125 milliGRAM(s) Oral daily  furosemide    Tablet 40 milliGRAM(s) Oral daily  ipratropium    for Nebulization 500 MICROGram(s) Nebulizer every 6 hours  metoprolol succinate ER 25 milliGRAM(s) Oral daily  pantoprazole Infusion 8 mG/Hr (10 mL/Hr) IV Continuous <Continuous>  potassium chloride    Tablet ER 20 milliEquivalent(s) Oral daily  simvastatin 5 milliGRAM(s) Oral at bedtime  sodium chloride 0.9%. 1000 milliLiter(s) (75 mL/Hr) IV Continuous <Continuous>    LABS:                        6.7    11.55 )-----------( 333      ( 28 Nov 2019 15:08 )             22.3     11-28    138  |  103  |  80<H>  ----------------------------<  107<H>  5.0   |  32<H>  |  1.79<H>    Ca    8.6      28 Nov 2019 15:08  Mg     2.4     11-27    TPro  8.1  /  Alb  3.1<L>  /  TBili  0.2  /  DBili  x   /  AST  17  /  ALT  13  /  AlkPhos  98  11-27    PT/INR - ( 28 Nov 2019 15:08 )   PT: 33.5 sec;   INR: 2.89 ratio       PTT - ( 27 Nov 2019 08:51 )  PTT:53.2 sec  11-27 @ 09:20  pH: 7.38  pCO2: 54  pO2: 57  SaO2: 88    ASSESSMENT AND PLAN:  ·	SOB, multifactorial.  ·	Chronic hypoxic hypercarbic Respiratory failure.  ·	CHF by history.  ·	pHTN.  ·	Atrial Fibrillation.  ·	S/P Mitral and Tricuspid Valve replacement.  ·	Severe Anemia.  ·	Suspect GI bleed.  ·	Renal Insuffiencey.  ·	Obesity.  ·	MIGUEL, did not tolerate CPAP.    Continue O2, diuretics and nebulizer.

## 2019-11-28 NOTE — PROGRESS NOTE ADULT - SUBJECTIVE AND OBJECTIVE BOX
Patient is a 69y old  Female who presents with a chief complaint of Lethargy, SOB (27 Nov 2019 19:02)      INTERVAL HPI/OVERNIGHT EVENTS:    MEDICATIONS  (STANDING):  allopurinol 100 milliGRAM(s) Oral two times a day  digoxin     Tablet 0.125 milliGRAM(s) Oral daily  furosemide    Tablet 40 milliGRAM(s) Oral daily  ipratropium    for Nebulization 500 MICROGram(s) Nebulizer every 6 hours  metoprolol succinate ER 25 milliGRAM(s) Oral daily  pantoprazole Infusion 8 mG/Hr (10 mL/Hr) IV Continuous <Continuous>  potassium chloride    Tablet ER 20 milliEquivalent(s) Oral daily  simvastatin 5 milliGRAM(s) Oral at bedtime  sodium chloride 0.9%. 1000 milliLiter(s) (75 mL/Hr) IV Continuous <Continuous>    MEDICATIONS  (PRN):      Allergies    No Known Allergies    Intolerances        REVIEW OF SYSTEMS:  CONSTITUTIONAL: No fever, weight loss, or fatigue  EYES: No eye pain, visual disturbances, or discharge  ENMT:  No difficulty hearing, tinnitus, vertigo; No sinus or throat pain  NECK: No pain or stiffness  BREASTS: No pain, masses, or nipple discharge  RESPIRATORY: No cough, wheezing, chills or hemoptysis; No shortness of breath  CARDIOVASCULAR: No chest pain, palpitations, dizziness, or leg swelling  GASTROINTESTINAL: No abdominal or epigastric pain. No nausea, vomiting, or hematemesis; No diarrhea or constipation. No melena or hematochezia.  GENITOURINARY: No dysuria, frequency, hematuria, or incontinence  NEUROLOGICAL: No headaches, memory loss, loss of strength, numbness, or tremors  SKIN: No itching, burning, rashes, or lesions   LYMPH NODES: No enlarged glands  ENDOCRINE: No heat or cold intolerance; No hair loss  MUSCULOSKELETAL: No joint pain or swelling; No muscle, back, or extremity pain  PSYCHIATRIC: No depression, anxiety, mood swings, or difficulty sleeping  HEME/LYMPH: No easy bruising, or bleeding gums  ALLERGY AND IMMUNOLOGIC: No hives or eczema    Vital Signs Last 24 Hrs  T(C): 36.2 (28 Nov 2019 07:05), Max: 37.1 (27 Nov 2019 16:22)  T(F): 97.2 (28 Nov 2019 07:05), Max: 98.7 (27 Nov 2019 16:22)  HR: 66 (28 Nov 2019 07:05) (66 - 86)  BP: 130/68 (28 Nov 2019 07:05) (115/65 - 130/68)  BP(mean): --  RR: 18 (28 Nov 2019 07:05) (18 - 22)  SpO2: 98% (28 Nov 2019 07:05) (98% - 100%)    PHYSICAL EXAM:  GENERAL: NAD, well-groomed, well-developed  HEAD:  Atraumatic, Normocephalic  EYES: EOMI, PERRL, conjunctiva and sclera clear  ENMT:  Moist mucous membranes, Good dentition, No lesions  NECK: Supple, No JVD, Normal thyroid  NERVOUS SYSTEM:  Alert & Oriented X3, Good concentration; Motor Strength 5/5 B/L upper and lower extremities; DTRs 2+ intact and symmetric  CHEST/LUNG: Clear to percussion bilaterally; No rales, rhonchi, wheezing, or rubs  HEART: Regular rate and rhythm; No murmurs, rubs, or gallops  ABDOMEN: Soft, Nontender, Nondistended; Bowel sounds present  EXTREMITIES:  2+ Peripheral Pulses, No clubbing, cyanosis, or edema  LYMPH: No lymphadenopathy noted  SKIN: No rashes or lesions    LABS:                        5.3    10.66 )-----------( 346      ( 27 Nov 2019 08:51 )             18.5     11-27    138  |  102  |  84<H>  ----------------------------<  141<H>  5.0   |  33<H>  |  1.54<H>    Ca    8.9      27 Nov 2019 08:51  Mg     2.4     11-27    TPro  8.1  /  Alb  3.1<L>  /  TBili  0.2  /  DBili  x   /  AST  17  /  ALT  13  /  AlkPhos  98  11-27    PT/INR - ( 27 Nov 2019 08:51 )   PT: 44.5 sec;   INR: 3.81 ratio         PTT - ( 27 Nov 2019 08:51 )  PTT:53.2 sec    CAPILLARY BLOOD GLUCOSE      POCT Blood Glucose.: 135 mg/dL (28 Nov 2019 03:38)    ABG - ( 27 Nov 2019 09:20 )  pH, Arterial: x     pH, Blood: 7.38  /  pCO2: 54    /  pO2: 57    / HCO3: 31    / Base Excess: 5.4   /  SaO2: 88                CARDIAC MARKERS ( 27 Nov 2019 08:51 )  <.015 ng/mL / x     / 81 U/L / x     / <1.0 ng/mL                RADIOLOGY & ADDITIONAL TESTS:    Imaging Personally Reviewed:  [ ] YES  [ ] NO    Consultant(s) Notes Reviewed:  [ ] YES  [ ] NO    Care Discussed with Consultants/Other Providers [ ] YES  [ ] NO    PROBLEMS:  GI BLEED  SHORTNESS OF BREATH  GI bleed  Atrial fibrillation, unspecified type  Gastrointestinal hemorrhage with melena  Mitral valve replaced  Longstanding persistent atrial fibrillation  Pulmonary hypertension  MIGUEL (obstructive sleep apnea)  Chronic obstructive pulmonary disease, unspecified COPD type  Gout  Essential hypertension  Gastrointestinal hemorrhage associated with anorectal source      Care discussed with family,         [  ]   yes  [  ]  No    imp:    stable[ ]    unstable[  ]     improving [   ]       unchanged  [  ]                Plans:  Continue present plans  [  ]               New consult [  ]   specialty  .......               order maribell[  ]    test name.                  Discharge Planning  [  ]

## 2019-11-28 NOTE — PROGRESS NOTE ADULT - SUBJECTIVE AND OBJECTIVE BOX
Gastroenterology  Patient seen and examined bedside resting comfortably.  No complaints offered. She denies any further episodes of melena this morning. Reports one episode in the ER yesterday, small amount. Denies any abdominal pain, nausea/vomiting.     T(F): 97.2 (11-28-19 @ 07:05), Max: 98.7 (11-27-19 @ 16:22)  HR: 66 (11-28-19 @ 07:05) (66 - 86)  BP: 130/68 (11-28-19 @ 07:05) (115/65 - 130/68)  RR: 18 (11-28-19 @ 07:05) (18 - 20)  SpO2: 98% (11-28-19 @ 07:05) (98% - 100%)  Wt(kg): --  CAPILLARY BLOOD GLUCOSE      POCT Blood Glucose.: 135 mg/dL (28 Nov 2019 03:38)      PHYSICAL EXAM:  General: NAD, obese    Neuro:  Alert & responsive  HEENT: NCAT, EOMI, conjunctiva clear  CV: +S1+S2 regular rate and rhythm  Lung: clear to ausculation bilaterally, respirations nonlabored, good inspiratory effort  Abdomen: soft, Non Tender, No distention Normal active BS  Extremities: no cyanosis, clubbing or edema    LABS:                        5.3    10.66 )-----------( 346      ( 27 Nov 2019 08:51 )             18.5     11-27    138  |  102  |  84<H>  ----------------------------<  141<H>  5.0   |  33<H>  |  1.54<H>    Ca    8.9      27 Nov 2019 08:51  Mg     2.4     11-27    TPro  8.1  /  Alb  3.1<L>  /  TBili  0.2  /  DBili  x   /  AST  17  /  ALT  13  /  AlkPhos  98  11-27    LIVER FUNCTIONS - ( 27 Nov 2019 08:51 )  Alb: 3.1 g/dL / Pro: 8.1 gm/dL / ALK PHOS: 98 U/L / ALT: 13 U/L / AST: 17 U/L / GGT: x           PT/INR - ( 27 Nov 2019 08:51 )   PT: 44.5 sec;   INR: 3.81 ratio         PTT - ( 27 Nov 2019 08:51 )  PTT:53.2 sec  I&O's Detail    27 Nov 2019 07:01  -  28 Nov 2019 07:00  --------------------------------------------------------  IN:  Total IN: 0 mL    OUT:    Voided: 300 mL  Total OUT: 300 mL    Total NET: -300 mL        11-27 @ 08:51    138 | 102 | 84  /8.9 | 2.4 | --  _______________________/  5.0 | 33 | 1.54                           \par

## 2019-11-29 LAB
HCT VFR BLD CALC: 25.8 % — LOW (ref 34.5–45)
HCV AB S/CO SERPL IA: 0.74 S/CO — SIGNIFICANT CHANGE UP (ref 0–0.99)
HCV AB SERPL-IMP: SIGNIFICANT CHANGE UP
HGB BLD-MCNC: 8 G/DL — LOW (ref 11.5–15.5)
INR BLD: 2.79 RATIO — HIGH (ref 0.88–1.16)
MCHC RBC-ENTMCNC: 28.7 PG — SIGNIFICANT CHANGE UP (ref 27–34)
MCHC RBC-ENTMCNC: 31 GM/DL — LOW (ref 32–36)
MCV RBC AUTO: 92.5 FL — SIGNIFICANT CHANGE UP (ref 80–100)
NRBC # BLD: 2 /100 WBCS — HIGH (ref 0–0)
PLATELET # BLD AUTO: 308 K/UL — SIGNIFICANT CHANGE UP (ref 150–400)
PROTHROM AB SERPL-ACNC: 32.3 SEC — HIGH (ref 10–12.9)
RBC # BLD: 2.79 M/UL — LOW (ref 3.8–5.2)
RBC # FLD: 16.7 % — HIGH (ref 10.3–14.5)
WBC # BLD: 12.25 K/UL — HIGH (ref 3.8–10.5)
WBC # FLD AUTO: 12.25 K/UL — HIGH (ref 3.8–10.5)

## 2019-11-29 RX ORDER — PANTOPRAZOLE SODIUM 20 MG/1
40 TABLET, DELAYED RELEASE ORAL
Refills: 0 | Status: DISCONTINUED | OUTPATIENT
Start: 2019-11-29 | End: 2019-12-16

## 2019-11-29 RX ADMIN — PANTOPRAZOLE SODIUM 40 MILLIGRAM(S): 20 TABLET, DELAYED RELEASE ORAL at 12:24

## 2019-11-29 RX ADMIN — Medication 25 MILLIGRAM(S): at 05:13

## 2019-11-29 RX ADMIN — PANTOPRAZOLE SODIUM 40 MILLIGRAM(S): 20 TABLET, DELAYED RELEASE ORAL at 17:19

## 2019-11-29 RX ADMIN — Medication 500 MICROGRAM(S): at 23:33

## 2019-11-29 RX ADMIN — SIMVASTATIN 5 MILLIGRAM(S): 20 TABLET, FILM COATED ORAL at 21:43

## 2019-11-29 RX ADMIN — Medication 100 MILLIGRAM(S): at 17:18

## 2019-11-29 RX ADMIN — PANTOPRAZOLE SODIUM 10 MG/HR: 20 TABLET, DELAYED RELEASE ORAL at 02:02

## 2019-11-29 RX ADMIN — Medication 500 MICROGRAM(S): at 11:06

## 2019-11-29 RX ADMIN — Medication 40 MILLIGRAM(S): at 05:13

## 2019-11-29 RX ADMIN — Medication 500 MICROGRAM(S): at 05:47

## 2019-11-29 RX ADMIN — Medication 20 MILLIEQUIVALENT(S): at 12:24

## 2019-11-29 RX ADMIN — Medication 100 MILLIGRAM(S): at 05:13

## 2019-11-29 RX ADMIN — Medication 500 MICROGRAM(S): at 17:22

## 2019-11-29 RX ADMIN — Medication 0.12 MILLIGRAM(S): at 05:13

## 2019-11-29 NOTE — PROGRESS NOTE ADULT - SUBJECTIVE AND OBJECTIVE BOX
Patient is a 69y old  Female who presents with a chief complaint of Lethargy, SOB (29 Nov 2019 10:04)      INTERVAL HPI/OVERNIGHT EVENTS:    MEDICATIONS  (STANDING):  allopurinol 100 milliGRAM(s) Oral two times a day  digoxin     Tablet 0.125 milliGRAM(s) Oral daily  furosemide    Tablet 40 milliGRAM(s) Oral daily  ipratropium    for Nebulization 500 MICROGram(s) Nebulizer every 6 hours  metoprolol succinate ER 25 milliGRAM(s) Oral daily  pantoprazole  Injectable 40 milliGRAM(s) IV Push two times a day  potassium chloride    Tablet ER 20 milliEquivalent(s) Oral daily  simvastatin 5 milliGRAM(s) Oral at bedtime    MEDICATIONS  (PRN):  sodium chloride 0.65% Nasal 1 Spray(s) Both Nostrils four times a day PRN Nasal Congestion      Allergies    No Known Allergies    Intolerances        REVIEW OF SYSTEMS:  CONSTITUTIONAL: No fever, weight loss, or fatigue  EYES: No eye pain, visual disturbances, or discharge  ENMT:  No difficulty hearing, tinnitus, vertigo; No sinus or throat pain  NECK: No pain or stiffness  BREASTS: No pain, masses, or nipple discharge  RESPIRATORY: No cough, wheezing, chills or hemoptysis; No shortness of breath  CARDIOVASCULAR: No chest pain, palpitations, dizziness, or leg swelling  GASTROINTESTINAL: No abdominal or epigastric pain. No nausea, vomiting, or hematemesis; No diarrhea or constipation. No melena or hematochezia.  GENITOURINARY: No dysuria, frequency, hematuria, or incontinence  NEUROLOGICAL: No headaches, memory loss, loss of strength, numbness, or tremors  SKIN: No itching, burning, rashes, or lesions   LYMPH NODES: No enlarged glands  ENDOCRINE: No heat or cold intolerance; No hair loss  MUSCULOSKELETAL: No joint pain or swelling; No muscle, back, or extremity pain  PSYCHIATRIC: No depression, anxiety, mood swings, or difficulty sleeping  HEME/LYMPH: No easy bruising, or bleeding gums  ALLERGY AND IMMUNOLOGIC: No hives or eczema    Vital Signs Last 24 Hrs  T(C): 36.6 (29 Nov 2019 09:30), Max: 37 (28 Nov 2019 20:30)  T(F): 97.8 (29 Nov 2019 09:30), Max: 98.6 (28 Nov 2019 20:30)  HR: 69 (29 Nov 2019 11:14) (66 - 92)  BP: 147/78 (29 Nov 2019 09:30) (112/76 - 149/76)  BP(mean): --  RR: 18 (29 Nov 2019 09:30) (17 - 22)  SpO2: 97% (29 Nov 2019 11:14) (92% - 100%)    PHYSICAL EXAM:  GENERAL: NAD, well-groomed, well-developed. Obese  HEAD:  Atraumatic, Normocephalic  EYES: EOMI, PERRL, conjunctiva and sclera clear  ENMT:  Moist mucous membranes, Good dentition, No lesions  NECK: Supple, No JVD, Normal thyroid  NERVOUS SYSTEM:  Alert & Oriented X 3, Good concentration; Motor Strength 5/5 B/L upper and lower extremities; DTRs 2+ intact and symmetric  CHEST/LUNG: Clear to percussion bilaterally; No rales, rhonchi, wheezing, or rubs  HEART: Regular rate and rhythm; No murmurs, rubs, or gallops  ABDOMEN: Soft, Nontender, Nondistended; Bowel sounds present  EXTREMITIES:  2+ Peripheral Pulses, No clubbing, cyanosis, or edema  LYMPH: No lymphadenopathy noted  SKIN: No rashes or lesions    LABS:                        6.5    12.71 )-----------( 322      ( 28 Nov 2019 21:57 )             21.6     11-28    138  |  103  |  80<H>  ----------------------------<  107<H>  5.0   |  32<H>  |  1.79<H>    Ca    8.6      28 Nov 2019 15:08      PT/INR - ( 28 Nov 2019 15:08 )   PT: 33.5 sec;   INR: 2.89 ratio             CAPILLARY BLOOD GLUCOSE      RADIOLOGY & ADDITIONAL TESTS:    Imaging Personally Reviewed:  [ ] YES  [ ] NO    Consultant(s) Notes Reviewed:  [ ] YES  [ ] NO    Care Discussed with Consultants/Other Providers [ ] YES  [ ] NO    PROBLEMS:  GI BLEED  SHORTNESS OF BREATH  GI bleed  Atrial fibrillation, unspecified type  Gastrointestinal hemorrhage with melena  Mitral valve replaced  Longstanding persistent atrial fibrillation  Pulmonary hypertension  MIGUEL (obstructive sleep apnea)  Chronic obstructive pulmonary disease, unspecified COPD type  Gout  Essential hypertension  Gastrointestinal hemorrhage associated with anorectal source      Care discussed with family,         [  ]   yes  [  ]  No    imp:    stable[ ]    unstable[  ]     improving [   ]       unchanged  [  ]                Plans:  Continue present plans  [  ]               New consult [  ]   specialty  .......               order maribell[  ]    test name.                  Discharge Planning  [  ]

## 2019-11-29 NOTE — PROGRESS NOTE ADULT - SUBJECTIVE AND OBJECTIVE BOX
Gastroenterology  Patient seen and examined bedside resting comfortably.  No complaints offered.   She reports a small dark bowel movement yesterday, not jet black and less dark then previous.   She denies any abdominal pain, nausea/vomiting. She is tolerating CLD well.     T(F): 97.8 (11-29-19 @ 09:30), Max: 98.6 (11-28-19 @ 20:30)  HR: 77 (11-29-19 @ 09:30) (66 - 92)  BP: 147/78 (11-29-19 @ 09:30) (112/76 - 149/76)  RR: 18 (11-29-19 @ 09:30) (17 - 22)  SpO2: 100% (11-29-19 @ 09:30) (92% - 100%)  Wt(kg): --  CAPILLARY BLOOD GLUCOSE        PHYSICAL EXAM:  General: NAD, obese   Neuro:  Alert & responsive  HEENT: NCAT, EOMI, conjunctiva clear  CV: +S1+S2 regular rate and rhythm  Lung: clear to ausculation bilaterally, respirations nonlabored, good inspiratory effort, NC in place   Abdomen: soft, Non Tender, No distention Normal active BS  Extremities: no cyanosis, clubbing or edema    LABS:                        6.5    12.71 )-----------( 322      ( 28 Nov 2019 21:57 )             21.6     11-28    138  |  103  |  80<H>  ----------------------------<  107<H>  5.0   |  32<H>  |  1.79<H>    Ca    8.6      28 Nov 2019 15:08        PT/INR - ( 28 Nov 2019 15:08 )   PT: 33.5 sec;   INR: 2.89 ratio           I&O's Detail    11-28 @ 15:08    138 | 103 | 80  /8.6 | -- | --  _______________________/  5.0 | 32 | 1.79                           \par

## 2019-11-29 NOTE — PROGRESS NOTE ADULT - SUBJECTIVE AND OBJECTIVE BOX
INTERVAL HPI:  69 year female with HTN, A fib, Mitral & Tricuspid valve replacement, CHF, COPD(never smoked) on home O2, Obesity, MIGUEL(did not tolerate CPAP), pHTN, Gout  Brought by EMS c/o progressively sob,  dizziness for two weeks,  unable to walk about 1/2 block. Increases  sob to laid down and decreases to sit up. Denies cough, chest pain, nausea, vomiting, fever, chills, abd. Also c/o black stool in the past one to two weeks, stopped taking iron pills three weeks ago.  Never smoked.    OVERNIGHT EVENTS:  Receiving  PRBC.    Vital Signs Last 24 Hrs  T(C): 36.6 (29 Nov 2019 09:30), Max: 37 (28 Nov 2019 20:30)  T(F): 97.8 (29 Nov 2019 09:30), Max: 98.6 (28 Nov 2019 20:30)  HR: 69 (29 Nov 2019 11:14) (66 - 90)  BP: 147/78 (29 Nov 2019 09:30) (112/76 - 149/76)  BP(mean): --  RR: 18 (29 Nov 2019 09:30) (17 - 22)  SpO2: 97% (29 Nov 2019 11:14) (92% - 100%)    PHYSICAL EXAM:  GEN:         Awake, responsive and comfortable.  HEENT:    Normal.    RESP:       no wheezing.  CVS:           Regular rate and rhythm.   ABD:         Soft, non-tender, non-distended;     MEDICATIONS  (STANDING):  allopurinol 100 milliGRAM(s) Oral two times a day  digoxin     Tablet 0.125 milliGRAM(s) Oral daily  furosemide    Tablet 40 milliGRAM(s) Oral daily  ipratropium    for Nebulization 500 MICROGram(s) Nebulizer every 6 hours  metoprolol succinate ER 25 milliGRAM(s) Oral daily  pantoprazole  Injectable 40 milliGRAM(s) IV Push two times a day  potassium chloride    Tablet ER 20 milliEquivalent(s) Oral daily  simvastatin 5 milliGRAM(s) Oral at bedtime    MEDICATIONS  (PRN):  sodium chloride 0.65% Nasal 1 Spray(s) Both Nostrils four times a day PRN Nasal Congestion    LABS:                        6.5    12.71 )-----------( 322      ( 28 Nov 2019 21:57 )             21.6     11-28    138  |  103  |  80<H>  ----------------------------<  107<H>  5.0   |  32<H>  |  1.79<H>    Ca    8.6      28 Nov 2019 15:08    PT/INR - ( 28 Nov 2019 15:08 )   PT: 33.5 sec;   INR: 2.89 ratio      11-27 @ 09:20  pH: 7.38  pCO2: 54  pO2: 57  SaO2: 88    ASSESSMENT AND PLAN:  ·	SOB, multifactorial.  ·	Chronic hypoxic hypercarbic Respiratory failure.  ·	CHF by history.  ·	pHTN.  ·	Atrial Fibrillation.  ·	S/P Mitral and Tricuspid Valve replacement.  ·	Severe Anemia.  ·	Suspect GI bleed.  ·	Renal Insuffiencey.  ·	Obesity.  ·	MIGUEL, did not tolerate CPAP.    Continue O2, diuretics and nebulizer.  PRBC transfusion, follow H & [facility].

## 2019-11-29 NOTE — PROGRESS NOTE ADULT - ASSESSMENT
69 AAM with PMHx MVR, TVR, afib on coumadin, HTN, CHF, COPD on home O2, pulmonary hypertension, morbid obesity who presents to the hospital with symptomatic anemia secondary to overt GI bleeding (melena), likely upper GI source.     VSS on presentation. Labs reveal Hg 5.3, BUN 84, Cr 1.54, INR 3.8.     Likely upper GI bleeding in the setting of melena, anemia, elevated BUN. Differential includes PUD, AVM, dieulafoy, esophagitis/gastritis.   **Given comorbidities she is very high risk for sedation; would advise for cardiac clearance prior to endoscopic evaluation    11/29: Hg 6.5 s/p 3 units pRBC, repeat Hg pending. Denies any active bleeding

## 2019-11-29 NOTE — PROGRESS NOTE ADULT - ASSESSMENT
IMPROVE VTE Individual Risk Assessment    RISK                                                                Points    [  ] Previous VTE                                                  3    [  ] Thrombophilia                                               2    [  ] Lower limb paralysis                                      2        (unable to hold up >15 seconds)      [  ] Current Cancer                                              2         (within 6 months)    [x  ] Immobilization > 24 hrs                                1    [  ] ICU/CCU stay > 24 hours                              1    [x  ] Age > 60                                                      1    IMPROVE VTE Score _2________    IMPROVE Score 0-1: Low Risk, No VTE prophylaxis required for most patients, encourage ambulation.   IMPROVE Score 2-3: At risk, pharmacologic VTE prophylaxis is indicated for most patients (in the absence of a contraindication)  IMPROVE Score > or = 4: High Risk, pharmacologic VTE prophylaxis is indicated for most patients (in the absence of a contraindication)    No Phar. A/C 2* GIB  11/28/19 : Asymptomatic at rest. Receiving blood transfusion. Obese. Clear lungs. RSR. GI consult.  11/29/19 : No overt bleeding. repeat Hb pending. Spoke to the cardiologist, will wait for INR to normalize before clearing for EGD if needed.

## 2019-11-30 LAB
HCT VFR BLD CALC: 24.7 % — LOW (ref 34.5–45)
HGB BLD-MCNC: 7.5 G/DL — LOW (ref 11.5–15.5)
MCHC RBC-ENTMCNC: 29.1 PG — SIGNIFICANT CHANGE UP (ref 27–34)
MCHC RBC-ENTMCNC: 30.4 GM/DL — LOW (ref 32–36)
MCV RBC AUTO: 95.7 FL — SIGNIFICANT CHANGE UP (ref 80–100)
NRBC # BLD: 1 /100 WBCS — HIGH (ref 0–0)
PLATELET # BLD AUTO: 304 K/UL — SIGNIFICANT CHANGE UP (ref 150–400)
RBC # BLD: 2.58 M/UL — LOW (ref 3.8–5.2)
RBC # FLD: 16.9 % — HIGH (ref 10.3–14.5)
WBC # BLD: 12.12 K/UL — HIGH (ref 3.8–10.5)
WBC # FLD AUTO: 12.12 K/UL — HIGH (ref 3.8–10.5)

## 2019-11-30 RX ADMIN — Medication 0.12 MILLIGRAM(S): at 05:29

## 2019-11-30 RX ADMIN — Medication 20 MILLIEQUIVALENT(S): at 10:42

## 2019-11-30 RX ADMIN — Medication 25 MILLIGRAM(S): at 05:30

## 2019-11-30 RX ADMIN — Medication 500 MICROGRAM(S): at 06:44

## 2019-11-30 RX ADMIN — Medication 100 MILLIGRAM(S): at 05:29

## 2019-11-30 RX ADMIN — Medication 100 MILLIGRAM(S): at 17:59

## 2019-11-30 RX ADMIN — Medication 40 MILLIGRAM(S): at 05:29

## 2019-11-30 RX ADMIN — PANTOPRAZOLE SODIUM 40 MILLIGRAM(S): 20 TABLET, DELAYED RELEASE ORAL at 05:30

## 2019-11-30 RX ADMIN — Medication 500 MICROGRAM(S): at 11:19

## 2019-11-30 RX ADMIN — PANTOPRAZOLE SODIUM 40 MILLIGRAM(S): 20 TABLET, DELAYED RELEASE ORAL at 17:59

## 2019-11-30 RX ADMIN — Medication 1 SPRAY(S): at 22:27

## 2019-11-30 RX ADMIN — Medication 500 MICROGRAM(S): at 17:31

## 2019-11-30 RX ADMIN — SIMVASTATIN 5 MILLIGRAM(S): 20 TABLET, FILM COATED ORAL at 22:26

## 2019-11-30 NOTE — PROGRESS NOTE ADULT - ASSESSMENT
69 AAM with PMHx MVR, TVR, afib on coumadin, HTN, CHF, COPD on home O2, pulmonary hypertension, morbid obesity who presents to the hospital with symptomatic anemia secondary to overt GI bleeding (melena), likely upper GI source.     VSS on presentation. Labs reveal Hg 5.3, BUN 84, Cr 1.54, INR 3.8.     Likely upper GI bleeding in the setting of melena, anemia, elevated BUN. Differential includes PUD, AVM, dieulafoy, esophagitis/gastritis.   **Given comorbidities she is very high risk for sedation; would advise for cardiac clearance prior to endoscopic evaluation    11/29: Hg 6.5 s/p 3 units pRBC, repeat Hg pending. Denies any active bleeding   11/30/19 HGB 8.0 yesterday, today's pending, No signs of active bleeding

## 2019-11-30 NOTE — PROGRESS NOTE ADULT - SUBJECTIVE AND OBJECTIVE BOX
Patient is a 69y old  Female who presents with a chief complaint of Lethargy, SOB (29 Nov 2019 12:20)      HPI:  · HPI : 69 years old female by ems c/o progressively sob dizziness for two weeks unable to walk about 1/2 block increases sob to laid down and decreases to sit up. Pt sts she has hx of chf. Pt denies recent hx of trauma, long traveling, headache, blurred visions, light sensitivities, focal/distal weakness or numbness, cough, chest pain, nausea, vomiting, fever, chills, abd, dysuria, hematuria, vaginal spotting or discharge. Pt also c/o black stool in the past one to two weeks. and sts she stopped taking iron pills three weeks ago. (27 Nov 2019 11:58)      INTERVAL HPI/OVERNIGHT EVENTS:    MEDICATIONS  (STANDING):  allopurinol 100 milliGRAM(s) Oral two times a day  digoxin     Tablet 0.125 milliGRAM(s) Oral daily  furosemide    Tablet 40 milliGRAM(s) Oral daily  ipratropium    for Nebulization 500 MICROGram(s) Nebulizer every 6 hours  metoprolol succinate ER 25 milliGRAM(s) Oral daily  pantoprazole  Injectable 40 milliGRAM(s) IV Push two times a day  potassium chloride    Tablet ER 20 milliEquivalent(s) Oral daily  simvastatin 5 milliGRAM(s) Oral at bedtime    MEDICATIONS  (PRN):  sodium chloride 0.65% Nasal 1 Spray(s) Both Nostrils four times a day PRN Nasal Congestion      FAMILY HISTORY:  Family history of hypertension (Mother)  Family history of stroke  Family history of hypertension (Father, Sibling)      Allergies    No Known Allergies    Intolerances        PMH/PSH:  Tricuspid valve replaced  Mitral valve replaced  Gout  HTN (hypertension)  CHF (congestive heart failure)  COPD (chronic obstructive pulmonary disease)  MIGUEL (obstructive sleep apnea)  Pulmonary hypertension  Atrial fibrillation  Tricuspid valve replaced  History of mitral valve replacement with mechanical valve  No significant past surgical history        REVIEW OF SYSTEMS:  CONSTITUTIONAL: No fever, weight loss, or fatigue  EYES: No eye pain, visual disturbances, or discharge  ENMT:  No difficulty hearing, tinnitus, vertigo; No sinus or throat pain  NECK: No pain or stiffness  BREASTS: No pain, masses, or nipple discharge  RESPIRATORY: No cough, wheezing, chills or hemoptysis; No shortness of breath  CARDIOVASCULAR: No chest pain, palpitations, dizziness, or leg swelling  GASTROINTESTINAL: No abdominal or epigastric pain. No nausea, vomiting, or hematemesis; No diarrhea or constipation. No melena or hematochezia.  GENITOURINARY: No dysuria, frequency, hematuria, or incontinence  NEUROLOGICAL: No headaches, memory loss, loss of strength, numbness, or tremors  SKIN: No itching, burning, rashes, or lesions   LYMPH NODES: No enlarged glands  ENDOCRINE: No heat or cold intolerance; No hair loss  MUSCULOSKELETAL: No joint pain or swelling; No muscle, back, or extremity pain  PSYCHIATRIC: No depression, anxiety, mood swings, or difficulty sleeping  HEME/LYMPH: No easy bruising, or bleeding gums  ALLERGY AND IMMUNOLOGIC: No hives or eczema    Vital Signs Last 24 Hrs  T(C): 36.3 (30 Nov 2019 05:35), Max: 36.7 (29 Nov 2019 17:58)  T(F): 97.4 (30 Nov 2019 05:35), Max: 98 (29 Nov 2019 17:58)  HR: 74 (30 Nov 2019 05:35) (66 - 82)  BP: 122/57 (30 Nov 2019 05:35) (113/54 - 147/78)  BP(mean): --  RR: 17 (30 Nov 2019 05:35) (17 - 20)  SpO2: 98% (30 Nov 2019 05:35) (92% - 100%)    PHYSICAL EXAM:  GENERAL: NAD, well-groomed, well-developed  HEAD:  Atraumatic, Normocephalic  EYES: EOMI, PERRLA, conjunctiva and sclera clear  ENMT: No tonsillar erythema, exudates, or enlargement; Moist mucous membranes, Good dentition, No lesions  NECK: Supple, No JVD, Normal thyroid  NERVOUS SYSTEM:  Alert & Oriented X3, Good concentration; Motor Strength 5/5 B/L upper and lower extremities; DTRs 2+ intact and symmetric  CHEST/LUNG: Clear to percussion bilaterally; No rales, rhonchi, wheezing, or rubs  HEART: Regular rate and rhythm; No murmurs, rubs, or gallops  ABDOMEN: Soft, Nontender, Nondistended; Bowel sounds present  EXTREMITIES:  2+ Peripheral Pulses, No clubbing, cyanosis, or edema  LYMPH: No lymphadenopathy noted  SKIN: No rashes or lesions    LAB                          8.0    12.25 )-----------( 308      ( 29 Nov 2019 14:46 )             25.8       CBC:  11-29 @ 14:46  WBC 12.25   Hgb 8.0   Hct 25.8   Plts 308  MCV 92.5  11-28 @ 21:57  WBC 12.71   Hgb 6.5   Hct 21.6   Plts 322  MCV 94.3  11-28 @ 15:08  WBC 11.55   Hgb 6.7   Hct 22.3   Plts 333  MCV 95.3  11-27 @ 08:51  WBC 10.66   Hgb 5.3   Hct 18.5   Plts 346  .5      Chemistry:  11-28 @ 15:08  Na+ 138  K+ 5.0  Cl- 103  CO2 32  BUN 80  Cr 1.79     11-27 @ 08:51  Na+ 138  K+ 5.0  Cl- 102  CO2 33  BUN 84  Cr 1.54         Glucose, Serum: 107 mg/dL (11-28 @ 15:08)  Glucose, Serum: 141 mg/dL (11-27 @ 08:51)      28 Nov 2019 15:08    138    |  103    |  80     ----------------------------<  107    5.0     |  32     |  1.79   27 Nov 2019 08:51    138    |  102    |  84     ----------------------------<  141    5.0     |  33     |  1.54     Ca    8.6        28 Nov 2019 15:08  Ca    8.9        27 Nov 2019 08:51  Mg     2.4       27 Nov 2019 08:51    TPro  8.1    /  Alb  3.1    /  TBili  0.2    /  DBili  x      /  AST  17     /  ALT  13     /  AlkPhos  98     27 Nov 2019 08:51      PT/INR - ( 29 Nov 2019 14:46 )   PT: 32.3 sec;   INR: 2.79 ratio                 CAPILLARY BLOOD GLUCOSE              RADIOLOGY & ADDITIONAL TESTS:    Imaging Personally Reviewed:  [ ] YES  [ ] NO    Consultant(s) Notes Reviewed:  [ ] YES  [ ] NO    Care Discussed with Consultants/Other Providers [ ] YES  [ ] NO Patient is a 69y old  Female who presents with a chief complaint of Lethargy, SOB (29 Nov 2019 12:20)      HPI:  · HPI : 69 years old female by ems c/o progressively sob dizziness for two weeks unable to walk about 1/2 block increases sob to laid down and decreases to sit up. Pt sts she has hx of chf. Pt denies recent hx of trauma, long traveling, headache, blurred visions, light sensitivities, focal/distal weakness or numbness, cough, chest pain, nausea, vomiting, fever, chills, abd, dysuria, hematuria, vaginal spotting or discharge. Pt also c/o black stool in the past one to two weeks. and sts she stopped taking iron pills three weeks ago. (27 Nov 2019 11:58)      INTERVAL HPI/OVERNIGHT EVENTS:  The patient denies melena, hematochezia, hematemesis, nausea, vomiting, abdominal pain, constipation, diarrhea, or change in bowel movements over past 24 hours ( No BM since Friday morning )     MEDICATIONS  (STANDING):  allopurinol 100 milliGRAM(s) Oral two times a day  digoxin     Tablet 0.125 milliGRAM(s) Oral daily  furosemide    Tablet 40 milliGRAM(s) Oral daily  ipratropium    for Nebulization 500 MICROGram(s) Nebulizer every 6 hours  metoprolol succinate ER 25 milliGRAM(s) Oral daily  pantoprazole  Injectable 40 milliGRAM(s) IV Push two times a day  potassium chloride    Tablet ER 20 milliEquivalent(s) Oral daily  simvastatin 5 milliGRAM(s) Oral at bedtime    MEDICATIONS  (PRN):  sodium chloride 0.65% Nasal 1 Spray(s) Both Nostrils four times a day PRN Nasal Congestion      FAMILY HISTORY:  Family history of hypertension (Mother)  Family history of stroke  Family history of hypertension (Father, Sibling)      Allergies    No Known Allergies    Intolerances        PMH/PSH:  Tricuspid valve replaced  Mitral valve replaced  Gout  HTN (hypertension)  CHF (congestive heart failure)  COPD (chronic obstructive pulmonary disease)  MIGUEL (obstructive sleep apnea)  Pulmonary hypertension  Atrial fibrillation  Tricuspid valve replaced  History of mitral valve replacement with mechanical valve  No significant past surgical history        REVIEW OF SYSTEMS:  CONSTITUTIONAL: No fever, weight loss,   EYES: No eye pain, visual disturbances, or discharge  ENMT:  No difficulty hearing, tinnitus, vertigo; No sinus or throat pain  NECK: No pain or stiffness  BREASTS: No pain, masses, or nipple discharge  RESPIRATORY: No cough, wheezing, chills or hemoptysis; No shortness of breath  CARDIOVASCULAR: No chest pain, palpitations, dizziness, or leg swelling  GASTROINTESTINAL: See above  GENITOURINARY: No dysuria, frequency, hematuria, or incontinence  NEUROLOGICAL: No headaches, memory loss, loss of strength, numbness, or tremors  SKIN: No itching, burning, rashes, or lesions   LYMPH NODES: No enlarged glands  ENDOCRINE: No heat or cold intolerance; No hair loss  MUSCULOSKELETAL: No joint pain or swelling; No muscle, back, or extremity pain  PSYCHIATRIC: No depression, anxiety, mood swings, or difficulty sleeping  HEME/LYMPH: No easy bruising, or bleeding gums  ALLERGY AND IMMUNOLOGIC: No hives or eczema    Vital Signs Last 24 Hrs  T(C): 36.3 (30 Nov 2019 05:35), Max: 36.7 (29 Nov 2019 17:58)  T(F): 97.4 (30 Nov 2019 05:35), Max: 98 (29 Nov 2019 17:58)  HR: 74 (30 Nov 2019 05:35) (66 - 82)  BP: 122/57 (30 Nov 2019 05:35) (113/54 - 147/78)  BP(mean): --  RR: 17 (30 Nov 2019 05:35) (17 - 20)  SpO2: 98% (30 Nov 2019 05:35) (92% - 100%)    PHYSICAL EXAM:  GENERAL: NAD, well-groomed, well-developed  HEAD:  Atraumatic, Normocephalic  EYES: EOMI, PERRLA, conjunctiva and sclera clear  NECK: Supple, No JVD, Normal thyroid  NERVOUS SYSTEM:  Alert & Oriented X3, Good concentration;  CHEST/LUNG: Clear to percussion bilaterally; No rales, rhonchi, wheezing, or rubs  HEART: Regular rate and rhythm; No murmurs, rubs, or gallops  ABDOMEN: Soft, Nontender, Nondistended; Bowel sounds present  EXTREMITIES:  2+ Peripheral Pulses, No clubbing, cyanosis, or edema  LYMPH: No lymphadenopathy noted  SKIN: No rashes or lesions    LAB                          8.0    12.25 )-----------( 308      ( 29 Nov 2019 14:46 )             25.8       CBC:  11-29 @ 14:46  WBC 12.25   Hgb 8.0   Hct 25.8   Plts 308  MCV 92.5  11-28 @ 21:57  WBC 12.71   Hgb 6.5   Hct 21.6   Plts 322  MCV 94.3  11-28 @ 15:08  WBC 11.55   Hgb 6.7   Hct 22.3   Plts 333  MCV 95.3  11-27 @ 08:51  WBC 10.66   Hgb 5.3   Hct 18.5   Plts 346  .5      Chemistry:  11-28 @ 15:08  Na+ 138  K+ 5.0  Cl- 103  CO2 32  BUN 80  Cr 1.79     11-27 @ 08:51  Na+ 138  K+ 5.0  Cl- 102  CO2 33  BUN 84  Cr 1.54         Glucose, Serum: 107 mg/dL (11-28 @ 15:08)  Glucose, Serum: 141 mg/dL (11-27 @ 08:51)      28 Nov 2019 15:08    138    |  103    |  80     ----------------------------<  107    5.0     |  32     |  1.79   27 Nov 2019 08:51    138    |  102    |  84     ----------------------------<  141    5.0     |  33     |  1.54     Ca    8.6        28 Nov 2019 15:08  Ca    8.9        27 Nov 2019 08:51  Mg     2.4       27 Nov 2019 08:51    TPro  8.1    /  Alb  3.1    /  TBili  0.2    /  DBili  x      /  AST  17     /  ALT  13     /  AlkPhos  98     27 Nov 2019 08:51      PT/INR - ( 29 Nov 2019 14:46 )   PT: 32.3 sec;   INR: 2.79 ratio                 CAPILLARY BLOOD GLUCOSE              RADIOLOGY & ADDITIONAL TESTS:    Imaging Personally Reviewed:  [ ] YES  [ ] NO    Consultant(s) Notes Reviewed:  [ ] YES  [ ] NO    Care Discussed with Consultants/Other Providers [ ] YES  [ ] NO

## 2019-11-30 NOTE — PROGRESS NOTE ADULT - SUBJECTIVE AND OBJECTIVE BOX
INTERVAL HPI:   69 year female with HTN, A fib, Mitral & Tricuspid valve replacement, CHF, COPD(never smoked) on home O2, Obesity, MIGUEL(did not tolerate CPAP), pHTN, Gout  Brought by EMS c/o progressively sob,  dizziness for two weeks,  unable to walk about 1/2 block. Increases  sob to laid down and decreases to sit up. Denies cough, chest pain, nausea, vomiting, fever, chills, abd. Also c/o black stool in the past one to two weeks, stopped taking iron pills three weeks ago.  Never smoked.    OVERNIGHT EVENTS:  Resting comfortably.    Vital Signs Last 24 Hrs  T(C): 37.1 (30 Nov 2019 17:00), Max: 37.1 (30 Nov 2019 17:00)  T(F): 98.8 (30 Nov 2019 17:00), Max: 98.8 (30 Nov 2019 17:00)  HR: 80 (30 Nov 2019 18:56) (72 - 84)  BP: 127/69 (30 Nov 2019 17:00) (122/57 - 133/61)  BP(mean): --  RR: 18 (30 Nov 2019 17:00) (16 - 18)  SpO2: 96% (30 Nov 2019 18:56) (96% - 100%)    PHYSICAL EXAM:  GEN:        Resting, comfortable.  HEENT:    Normal.    RESP:      no distress.  CVS:          Regular rate and rhythm.   ABD:         Soft, non-tender, non-distended;     MEDICATIONS  (STANDING):  allopurinol 100 milliGRAM(s) Oral two times a day  digoxin     Tablet 0.125 milliGRAM(s) Oral daily  furosemide    Tablet 40 milliGRAM(s) Oral daily  ipratropium    for Nebulization 500 MICROGram(s) Nebulizer every 6 hours  metoprolol succinate ER 25 milliGRAM(s) Oral daily  pantoprazole  Injectable 40 milliGRAM(s) IV Push two times a day  potassium chloride    Tablet ER 20 milliEquivalent(s) Oral daily  simvastatin 5 milliGRAM(s) Oral at bedtime    MEDICATIONS  (PRN):  sodium chloride 0.65% Nasal 1 Spray(s) Both Nostrils four times a day PRN Nasal Congestion    LABS:                        7.5    12.12 )-----------( 304      ( 30 Nov 2019 10:09 )             24.7   PT/INR - ( 29 Nov 2019 14:46 )   PT: 32.3 sec;   INR: 2.79 ratio      11-27 @ 09:20  pH: 7.38  pCO2: 54  pO2: 57  SaO2: 88    ASSESSMENT AND PLAN:  ·	SOB, multifactorial.  ·	Chronic hypoxic hypercarbic Respiratory failure.  ·	CHF by history.  ·	pHTN.  ·	Atrial Fibrillation.  ·	S/P Mitral and Tricuspid Valve replacement.  ·	Severe Anemia.  ·	Suspect GI bleed.  ·	Renal Insuffiencey.  ·	Obesity.  ·	MIGUEL, did not tolerate CPAP.    Continue O2, nebulizer and diuretics.  Follow [facility] & [facility].

## 2019-11-30 NOTE — PROGRESS NOTE ADULT - ASSESSMENT
IMPROVE VTE Individual Risk Assessment    RISK                                                                Points    [  ] Previous VTE                                                  3    [  ] Thrombophilia                                               2    [  ] Lower limb paralysis                                      2        (unable to hold up >15 seconds)      [  ] Current Cancer                                              2         (within 6 months)    [x  ] Immobilization > 24 hrs                                1    [  ] ICU/CCU stay > 24 hours                              1    [x  ] Age > 60                                                      1    IMPROVE VTE Score _2________    IMPROVE Score 0-1: Low Risk, No VTE prophylaxis required for most patients, encourage ambulation.   IMPROVE Score 2-3: At risk, pharmacologic VTE prophylaxis is indicated for most patients (in the absence of a contraindication)  IMPROVE Score > or = 4: High Risk, pharmacologic VTE prophylaxis is indicated for most patients (in the absence of a contraindication)    No Phar. A/C 2* GIB  11/28/19 : Asymptomatic at rest. Receiving blood transfusion. Obese. Clear lungs. RSR. GI consult.  11/29/19 : No overt bleeding. repeat Hb pending. Spoke to the cardiologist, will wait for INR to normalize before clearing for EGD if needed.  11/30/19 : Hb 8.0 No overt bleeding. INR therapeutic. Follow CBC. Pt. asymptomatic

## 2019-11-30 NOTE — PROGRESS NOTE ADULT - SUBJECTIVE AND OBJECTIVE BOX
Patient is a 69y old  Female who presents with a chief complaint of Lethargy, SOB (30 Nov 2019 07:05)      INTERVAL HPI/OVERNIGHT EVENTS:    MEDICATIONS  (STANDING):  allopurinol 100 milliGRAM(s) Oral two times a day  digoxin     Tablet 0.125 milliGRAM(s) Oral daily  furosemide    Tablet 40 milliGRAM(s) Oral daily  ipratropium    for Nebulization 500 MICROGram(s) Nebulizer every 6 hours  metoprolol succinate ER 25 milliGRAM(s) Oral daily  pantoprazole  Injectable 40 milliGRAM(s) IV Push two times a day  potassium chloride    Tablet ER 20 milliEquivalent(s) Oral daily  simvastatin 5 milliGRAM(s) Oral at bedtime    MEDICATIONS  (PRN):  sodium chloride 0.65% Nasal 1 Spray(s) Both Nostrils four times a day PRN Nasal Congestion      Allergies    No Known Allergies    Intolerances        REVIEW OF SYSTEMS:  CONSTITUTIONAL: No fever, weight loss, or fatigue  EYES: No eye pain, visual disturbances, or discharge  ENMT:  No difficulty hearing, tinnitus, vertigo; No sinus or throat pain  NECK: No pain or stiffness  BREASTS: No pain, masses, or nipple discharge  RESPIRATORY: No cough, wheezing, chills or hemoptysis; No shortness of breath  CARDIOVASCULAR: No chest pain, palpitations, dizziness, or leg swelling  GASTROINTESTINAL: No abdominal or epigastric pain. No nausea, vomiting, or hematemesis; No diarrhea or constipation. No melena or hematochezia.  GENITOURINARY: No dysuria, frequency, hematuria, or incontinence  NEUROLOGICAL: No headaches, memory loss, loss of strength, numbness, or tremors  SKIN: No itching, burning, rashes, or lesions   LYMPH NODES: No enlarged glands  ENDOCRINE: No heat or cold intolerance; No hair loss  MUSCULOSKELETAL: No joint pain or swelling; No muscle, back, or extremity pain  PSYCHIATRIC: No depression, anxiety, mood swings, or difficulty sleeping  HEME/LYMPH: No easy bruising, or bleeding gums  ALLERGY AND IMMUNOLOGIC: No hives or eczema    Vital Signs Last 24 Hrs  T(C): 36.3 (30 Nov 2019 05:35), Max: 36.7 (29 Nov 2019 17:58)  T(F): 97.4 (30 Nov 2019 05:35), Max: 98 (29 Nov 2019 17:58)  HR: 74 (30 Nov 2019 05:35) (66 - 77)  BP: 122/57 (30 Nov 2019 05:35) (121/66 - 147/78)  BP(mean): --  RR: 17 (30 Nov 2019 05:35) (17 - 18)  SpO2: 98% (30 Nov 2019 05:35) (97% - 100%)    PHYSICAL EXAM:  GENERAL: NAD, well-groomed, well-developed. Obese  HEAD:  Atraumatic, Normocephalic  EYES: EOMI, PERRL, conjunctiva and sclera clear  ENMT:  Moist mucous membranes, Good dentition, No lesions  NECK: Supple, No JVD, Normal thyroid  NERVOUS SYSTEM:  Alert & Oriented X3, Good concentration; Motor Strength 5/5 B/L upper and lower extremities; DTRs 2+ intact and symmetric  CHEST/LUNG: Clear to percussion bilaterally; No rales, rhonchi, wheezing, or rubs  HEART: Regular rate and rhythm; No murmurs, rubs, or gallops  ABDOMEN: Soft, Nontender, Nondistended; Bowel sounds present  EXTREMITIES:  2+ Peripheral Pulses, No clubbing, cyanosis, or edema  LYMPH: No lymphadenopathy noted  SKIN: No rashes or lesions    LABS:                        8.0    12.25 )-----------( 308      ( 29 Nov 2019 14:46 )             25.8     11-28    138  |  103  |  80<H>  ----------------------------<  107<H>  5.0   |  32<H>  |  1.79<H>    Ca    8.6      28 Nov 2019 15:08      PT/INR - ( 29 Nov 2019 14:46 )   PT: 32.3 sec;   INR: 2.79 ratio             CAPILLARY BLOOD GLUCOSE        RADIOLOGY & ADDITIONAL TESTS:    Imaging Personally Reviewed:  x[ ] YES  [ ] NO    Consultant(s) Notes Reviewed:  [ ] YES  [ ] NO    Care Discussed with Consultants/Other Providers [ ] YES  [ ] NO    PROBLEMS:  GI BLEED  SHORTNESS OF BREATH  GI bleed  Atrial fibrillation, unspecified type  Gastrointestinal hemorrhage with melena  Mitral valve replaced  Longstanding persistent atrial fibrillation  Pulmonary hypertension  MIGUEL (obstructive sleep apnea)  Chronic obstructive pulmonary disease, unspecified COPD type  Gout  Essential hypertension  Gastrointestinal hemorrhage associated with anorectal source      Care discussed with family,         [  ]   yes  [  ]  No    imp:    stable[ ]    unstable[  ]     improving [   ]       unchanged  [  ]                Plans:  Continue present plans  [  ]               New consult [  ]   specialty  .......               order maribell[  ]    test name.                  Discharge Planning  [  ]

## 2019-12-01 LAB
BLD GP AB SCN SERPL QL: SIGNIFICANT CHANGE UP
HCT VFR BLD CALC: 24 % — LOW (ref 34.5–45)
HGB BLD-MCNC: 7.3 G/DL — LOW (ref 11.5–15.5)
INR BLD: 2.4 RATIO — HIGH (ref 0.88–1.16)
MCHC RBC-ENTMCNC: 29.4 PG — SIGNIFICANT CHANGE UP (ref 27–34)
MCHC RBC-ENTMCNC: 30.4 GM/DL — LOW (ref 32–36)
MCV RBC AUTO: 96.8 FL — SIGNIFICANT CHANGE UP (ref 80–100)
NRBC # BLD: 0 /100 WBCS — SIGNIFICANT CHANGE UP (ref 0–0)
PLATELET # BLD AUTO: 321 K/UL — SIGNIFICANT CHANGE UP (ref 150–400)
PROTHROM AB SERPL-ACNC: 27.6 SEC — HIGH (ref 10–12.9)
RBC # BLD: 2.48 M/UL — LOW (ref 3.8–5.2)
RBC # FLD: 16.1 % — HIGH (ref 10.3–14.5)
WBC # BLD: 12.62 K/UL — HIGH (ref 3.8–10.5)
WBC # FLD AUTO: 12.62 K/UL — HIGH (ref 3.8–10.5)

## 2019-12-01 RX ADMIN — PANTOPRAZOLE SODIUM 40 MILLIGRAM(S): 20 TABLET, DELAYED RELEASE ORAL at 06:18

## 2019-12-01 RX ADMIN — PANTOPRAZOLE SODIUM 40 MILLIGRAM(S): 20 TABLET, DELAYED RELEASE ORAL at 16:17

## 2019-12-01 RX ADMIN — Medication 500 MICROGRAM(S): at 17:29

## 2019-12-01 RX ADMIN — Medication 20 MILLIEQUIVALENT(S): at 10:55

## 2019-12-01 RX ADMIN — Medication 0.12 MILLIGRAM(S): at 06:18

## 2019-12-01 RX ADMIN — SIMVASTATIN 5 MILLIGRAM(S): 20 TABLET, FILM COATED ORAL at 22:02

## 2019-12-01 RX ADMIN — Medication 500 MICROGRAM(S): at 00:27

## 2019-12-01 RX ADMIN — Medication 40 MILLIGRAM(S): at 06:18

## 2019-12-01 RX ADMIN — Medication 25 MILLIGRAM(S): at 06:18

## 2019-12-01 RX ADMIN — Medication 100 MILLIGRAM(S): at 16:17

## 2019-12-01 RX ADMIN — Medication 500 MICROGRAM(S): at 05:30

## 2019-12-01 RX ADMIN — Medication 500 MICROGRAM(S): at 23:44

## 2019-12-01 RX ADMIN — Medication 100 MILLIGRAM(S): at 06:17

## 2019-12-01 NOTE — PROGRESS NOTE ADULT - ASSESSMENT
69 AAM with PMHx MVR, TVR, afib on coumadin, HTN, CHF, COPD on home O2, pulmonary hypertension, morbid obesity who presents to the hospital with symptomatic anemia secondary to overt GI bleeding (melena), likely upper GI source.     VSS on presentation. Labs reveal Hg 5.3, BUN 84, Cr 1.54, INR 3.8.     Likely upper GI bleeding in the setting of melena, anemia, elevated BUN. Differential includes PUD, AVM, dieulafoy, esophagitis/gastritis.   **Given comorbidities she is very high risk for sedation; would advise for cardiac clearance prior to endoscopic evaluation    11/29: Hg 6.5 s/p 3 units pRBC, repeat Hg pending. Denies any active bleeding   11/30/19 HGB 8.0 yesterday, today's pending, No signs of active bleeding  12/ 01/19HGB 7.3, drifting downwards. INR 2.4. No signs of active bleeding

## 2019-12-01 NOTE — PROGRESS NOTE ADULT - ASSESSMENT
IMPROVE VTE Individual Risk Assessment    RISK                                                                Points    [  ] Previous VTE                                                  3    [  ] Thrombophilia                                               2    [  ] Lower limb paralysis                                      2        (unable to hold up >15 seconds)      [  ] Current Cancer                                              2         (within 6 months)    [x  ] Immobilization > 24 hrs                                1    [  ] ICU/CCU stay > 24 hours                              1    [x  ] Age > 60                                                      1    IMPROVE VTE Score _2________    IMPROVE Score 0-1: Low Risk, No VTE prophylaxis required for most patients, encourage ambulation.   IMPROVE Score 2-3: At risk, pharmacologic VTE prophylaxis is indicated for most patients (in the absence of a contraindication)  IMPROVE Score > or = 4: High Risk, pharmacologic VTE prophylaxis is indicated for most patients (in the absence of a contraindication)    No Phar. A/C 2* GIB  11/28/19 : Asymptomatic at rest. Receiving blood transfusion. Obese. Clear lungs. RSR. GI consult.  11/29/19 : No overt bleeding. repeat Hb pending. Spoke to the cardiologist, will wait for INR to normalize before clearing for EGD if needed.  11/30/19 : Hb 8.0 No overt bleeding. INR therapeutic. Follow CBC. Pt. asymptomatic  112/01/19 : Mild SOB. Hb. trending down, transfuse 1 unit PRBC. INR 2.40. Continue monitoring Hb.

## 2019-12-01 NOTE — DIETITIAN INITIAL EVALUATION ADULT. - ENERGY NEEDS
Height (cm): 162.56 (11-27)  Weight (kg): 112.4 (12-01)  BMI (kg/m2): 42.6 (12-01)  IBW: 54.5 kg +/- 10%   % IBW:  206%    UBW:  stable     %UBW: 100%

## 2019-12-01 NOTE — PROGRESS NOTE ADULT - SUBJECTIVE AND OBJECTIVE BOX
Patient is a 69y old  Female who presents with a chief complaint of Lethargy, SOB (01 Dec 2019 09:45)      HPI:  · HPI : 69 years old female by ems c/o progressively sob dizziness for two weeks unable to walk about 1/2 block increases sob to laid down and decreases to sit up. Pt sts she has hx of chf. Pt denies recent hx of trauma, long traveling, headache, blurred visions, light sensitivities, focal/distal weakness or numbness, cough, chest pain, nausea, vomiting, fever, chills, abd, dysuria, hematuria, vaginal spotting or discharge. Pt also c/o black stool in the past one to two weeks. and sts she stopped taking iron pills three weeks ago. (27 Nov 2019 11:58)      INTERVAL HPI/OVERNIGHT EVENTS:  Two dark BMs since last seen. Tolerating regular diet. The patient denies hematochezia, hematemesis, nausea, vomiting, abdominal pain, constipation, diarrhea, or change in bowel movements     MEDICATIONS  (STANDING):  allopurinol 100 milliGRAM(s) Oral two times a day  digoxin     Tablet 0.125 milliGRAM(s) Oral daily  furosemide    Tablet 40 milliGRAM(s) Oral daily  ipratropium    for Nebulization 500 MICROGram(s) Nebulizer every 6 hours  metoprolol succinate ER 25 milliGRAM(s) Oral daily  pantoprazole  Injectable 40 milliGRAM(s) IV Push two times a day  potassium chloride    Tablet ER 20 milliEquivalent(s) Oral daily  simvastatin 5 milliGRAM(s) Oral at bedtime    MEDICATIONS  (PRN):  sodium chloride 0.65% Nasal 1 Spray(s) Both Nostrils four times a day PRN Nasal Congestion      FAMILY HISTORY:  Family history of hypertension (Mother)  Family history of stroke  Family history of hypertension (Father, Sibling)      Allergies    No Known Allergies    Intolerances        PMH/PSH:  Tricuspid valve replaced  Mitral valve replaced  Gout  HTN (hypertension)  CHF (congestive heart failure)  COPD (chronic obstructive pulmonary disease)  MIGUEL (obstructive sleep apnea)  Pulmonary hypertension  Atrial fibrillation  Tricuspid valve replaced  History of mitral valve replacement with mechanical valve  No significant past surgical history        REVIEW OF SYSTEMS:  CONSTITUTIONAL: No fever, weight loss, or fatigue  EYES: No eye pain, visual disturbances, or discharge  ENMT:  No difficulty hearing, tinnitus, vertigo; No sinus or throat pain  NECK: No pain or stiffness  BREASTS: No pain, masses, or nipple discharge  RESPIRATORY: No cough, wheezing, chills or hemoptysis; No shortness of breath  CARDIOVASCULAR: No chest pain, palpitations, dizziness, or leg swelling  GASTROINTESTINAL:  See above  GENITOURINARY: No dysuria, frequency, hematuria, or incontinence  NEUROLOGICAL: No headaches, memory loss, loss of strength, numbness, or tremors  SKIN: No itching, burning, rashes, or lesions   LYMPH NODES: No enlarged glands  ENDOCRINE: No heat or cold intolerance; No hair loss  MUSCULOSKELETAL: No joint pain or swelling; No muscle, back, or extremity pain  PSYCHIATRIC: No depression, anxiety, mood swings, or difficulty sleeping  HEME/LYMPH: No easy bruising, or bleeding gums  ALLERGY AND IMMUNOLOGIC: No hives or eczema    Vital Signs Last 24 Hrs  T(C): 37.1 (01 Dec 2019 13:09), Max: 37.1 (30 Nov 2019 17:00)  T(F): 98.8 (01 Dec 2019 13:09), Max: 98.8 (30 Nov 2019 17:00)  HR: 87 (01 Dec 2019 13:09) (76 - 87)  BP: 115/49 (01 Dec 2019 13:09) (115/49 - 146/89)  BP(mean): --  RR: 18 (01 Dec 2019 13:09) (18 - 18)  SpO2: 100% (01 Dec 2019 13:09) (95% - 100%)    PHYSICAL EXAM:  GENERAL: NAD, well-groomed, well-developed  HEAD:  Atraumatic, Normocephalic  EYES: EOMI, PERRLA, conjunctiva and sclera clear  NECK: Supple, No JVD, Normal thyroid  NERVOUS SYSTEM:  Alert & Oriented X3,   CHEST/LUNG: Clear to percussion bilaterally; No rales, rhonchi, wheezing, or rubs  HEART: Regular rate and rhythm; No murmurs, rubs, or gallops  ABDOMEN: Soft, Nontender, Nondistended; Bowel sounds present  EXTREMITIES:  2+ Peripheral Pulses, No clubbing, cyanosis, or edema  LYMPH: No lymphadenopathy noted  SKIN: No rashes or lesions    LAB                          7.3    12.62 )-----------( 321      ( 01 Dec 2019 08:07 )             24.0       CBC:  12-01 @ 08:07  WBC 12.62   Hgb 7.3   Hct 24.0   Plts 321  MCV 96.8  11-30 @ 10:09  WBC 12.12   Hgb 7.5   Hct 24.7   Plts 304  MCV 95.7  11-29 @ 14:46  WBC 12.25   Hgb 8.0   Hct 25.8   Plts 308  MCV 92.5  11-28 @ 21:57  WBC 12.71   Hgb 6.5   Hct 21.6   Plts 322  MCV 94.3  11-28 @ 15:08  WBC 11.55   Hgb 6.7   Hct 22.3   Plts 333  MCV 95.3  11-27 @ 08:51  WBC 10.66   Hgb 5.3   Hct 18.5   Plts 346  .5      Chemistry:  11-28 @ 15:08  Na+ 138  K+ 5.0  Cl- 103  CO2 32  BUN 80  Cr 1.79     11-27 @ 08:51  Na+ 138  K+ 5.0  Cl- 102  CO2 33  BUN 84  Cr 1.54         Glucose, Serum: 107 mg/dL (11-28 @ 15:08)  Glucose, Serum: 141 mg/dL (11-27 @ 08:51)      28 Nov 2019 15:08    138    |  103    |  80     ----------------------------<  107    5.0     |  32     |  1.79   27 Nov 2019 08:51    138    |  102    |  84     ----------------------------<  141    5.0     |  33     |  1.54     Ca    8.6        28 Nov 2019 15:08  Ca    8.9        27 Nov 2019 08:51  Mg     2.4       27 Nov 2019 08:51    TPro  8.1    /  Alb  3.1    /  TBili  0.2    /  DBili  x      /  AST  17     /  ALT  13     /  AlkPhos  98     27 Nov 2019 08:51      PT/INR - ( 01 Dec 2019 08:07 )   PT: 27.6 sec;   INR: 2.40 ratio                 CAPILLARY BLOOD GLUCOSE              RADIOLOGY & ADDITIONAL TESTS:    Imaging Personally Reviewed:  [ ] YES  [ ] NO    Consultant(s) Notes Reviewed:  [ ] YES  [ ] NO    Care Discussed with Consultants/Other Providers [ ] YES  [ ] NO

## 2019-12-01 NOTE — DIETITIAN INITIAL EVALUATION ADULT. - PERTINENT LABORATORY DATA
11-28 Na138 mmol/L Glu 107 mg/dL<H> K+ 5.0 mmol/L Cr  1.79 mg/dL<H> BUN 80 mg/dL<H> 11-27 Alb 3.1 g/dL<L>

## 2019-12-01 NOTE — DIETITIAN INITIAL EVALUATION ADULT. - ADD RECOMMEND
Advance diet as medically appropriate; recommend DASH diet; provided nutritional counseling/educational material

## 2019-12-01 NOTE — DIETITIAN INITIAL EVALUATION ADULT. - OTHER INFO
Pt lives c spouse & daughter; family assists c ADLs &  does cooking. Denies any N/V/C/D or chew/swallowing difficulty. Pt reports wt has been stable & appetite is good. Discussed daily wt & Low Na diet to monitor/control fluid retention. Reviewed wt loss tips & DASH diet foods to assist c wt loss.  {t pn full liquid diet as c active bleeding awaiting upper endo procedure.

## 2019-12-01 NOTE — PROGRESS NOTE ADULT - SUBJECTIVE AND OBJECTIVE BOX
INTERVAL HPI:   69 year female with HTN, A fib, Mitral & Tricuspid valve replacement, CHF, COPD(never smoked) on home O2, Obesity, MIGUEL(did not tolerate CPAP), pHTN, Gout  Brought by EMS c/o progressively sob,  dizziness for two weeks,  unable to walk about 1/2 block. Increases  sob to laid down and decreases to sit up. Denies cough, chest pain, nausea, vomiting, fever, chills, abd. Also c/o black stool in the past one to two weeks, stopped taking iron pills three weeks ago.  Never smoked.    OVERNIGHT EVENTS:  Comfortable in bed.    Vital Signs Last 24 Hrs  T(C): 37.1 (01 Dec 2019 13:09), Max: 37.1 (30 Nov 2019 17:00)  T(F): 98.8 (01 Dec 2019 13:09), Max: 98.8 (30 Nov 2019 17:00)  HR: 87 (01 Dec 2019 13:09) (76 - 87)  BP: 115/49 (01 Dec 2019 13:09) (115/49 - 146/89)  BP(mean): --  RR: 18 (01 Dec 2019 13:09) (18 - 18)  SpO2: 100% (01 Dec 2019 13:09) (95% - 100%)    PHYSICAL EXAM:  GEN:         Awake, responsive and comfortable.  HEENT:    Normal.    RESP:        no wheezing  CVS:          Regular rate and rhythm.   ABD:         Soft, non-tender, non-distended;     MEDICATIONS  (STANDING):  allopurinol 100 milliGRAM(s) Oral two times a day  digoxin     Tablet 0.125 milliGRAM(s) Oral daily  furosemide    Tablet 40 milliGRAM(s) Oral daily  ipratropium    for Nebulization 500 MICROGram(s) Nebulizer every 6 hours  metoprolol succinate ER 25 milliGRAM(s) Oral daily  pantoprazole  Injectable 40 milliGRAM(s) IV Push two times a day  potassium chloride    Tablet ER 20 milliEquivalent(s) Oral daily  simvastatin 5 milliGRAM(s) Oral at bedtime    MEDICATIONS  (PRN):  sodium chloride 0.65% Nasal 1 Spray(s) Both Nostrils four times a day PRN Nasal Congestion    LABS:                        7.3    12.62 )-----------( 321      ( 01 Dec 2019 08:07 )             24.0     PT/INR - ( 01 Dec 2019 08:07 )   PT: 27.6 sec;   INR: 2.40 ratio      11-27 @ 09:20  pH: 7.38  pCO2: 54  pO2: 57  SaO2: 88    ASSESSMENT AND PLAN:  ·	SOB, multifactorial.  ·	Chronic hypoxic hypercarbic Respiratory failure.  ·	CHF by history.  ·	pHTN.  ·	Atrial Fibrillation.  ·	S/P Mitral and Tricuspid Valve replacement.  ·	Severe Anemia.  ·	Suspect GI bleed.  ·	Renal Insuffiencey.  ·	Obesity.  ·	MIGUEL, did not tolerate CPAP.    Pulmonary status close to base line.  Continue O2, nebulizer and diuretics.  Follow H & H.

## 2019-12-01 NOTE — PROGRESS NOTE ADULT - SUBJECTIVE AND OBJECTIVE BOX
Patient is a 69y old  Female who presents with a chief complaint of Lethargy, SOB (30 Nov 2019 19:51)      INTERVAL HPI/OVERNIGHT EVENTS:    MEDICATIONS  (STANDING):  allopurinol 100 milliGRAM(s) Oral two times a day  digoxin     Tablet 0.125 milliGRAM(s) Oral daily  furosemide    Tablet 40 milliGRAM(s) Oral daily  ipratropium    for Nebulization 500 MICROGram(s) Nebulizer every 6 hours  metoprolol succinate ER 25 milliGRAM(s) Oral daily  pantoprazole  Injectable 40 milliGRAM(s) IV Push two times a day  potassium chloride    Tablet ER 20 milliEquivalent(s) Oral daily  simvastatin 5 milliGRAM(s) Oral at bedtime    MEDICATIONS  (PRN):  sodium chloride 0.65% Nasal 1 Spray(s) Both Nostrils four times a day PRN Nasal Congestion      Allergies    No Known Allergies    Intolerances        REVIEW OF SYSTEMS:  CONSTITUTIONAL: No fever, weight loss, or fatigue  EYES: No eye pain, visual disturbances, or discharge  ENMT:  No difficulty hearing, tinnitus, vertigo; No sinus or throat pain  NECK: No pain or stiffness  BREASTS: No pain, masses, or nipple discharge  RESPIRATORY: No cough, wheezing, chills or hemoptysis; No shortness of breath  CARDIOVASCULAR: No chest pain, palpitations, dizziness, or leg swelling  GASTROINTESTINAL: No abdominal or epigastric pain. No nausea, vomiting, or hematemesis; No diarrhea or constipation. No melena or hematochezia.  GENITOURINARY: No dysuria, frequency, hematuria, or incontinence  NEUROLOGICAL: No headaches, memory loss, loss of strength, numbness, or tremors  SKIN: No itching, burning, rashes, or lesions   LYMPH NODES: No enlarged glands  ENDOCRINE: No heat or cold intolerance; No hair loss  MUSCULOSKELETAL: No joint pain or swelling; No muscle, back, or extremity pain  PSYCHIATRIC: No depression, anxiety, mood swings, or difficulty sleeping  HEME/LYMPH: No easy bruising, or bleeding gums  ALLERGY AND IMMUNOLOGIC: No hives or eczema    Vital Signs Last 24 Hrs  T(C): 36.6 (01 Dec 2019 04:46), Max: 37.1 (30 Nov 2019 17:00)  T(F): 97.8 (01 Dec 2019 04:46), Max: 98.8 (30 Nov 2019 17:00)  HR: 76 (01 Dec 2019 05:48) (72 - 84)  BP: 146/89 (01 Dec 2019 04:46) (127/69 - 146/89)  BP(mean): --  RR: 18 (01 Dec 2019 04:46) (16 - 18)  SpO2: 95% (01 Dec 2019 05:48) (95% - 100%)    PHYSICAL EXAM:  GENERAL: NAD, well-groomed, well-developed. Obese  HEAD:  Atraumatic, Normocephalic  EYES: EOMI, PERRL, conjunctiva and sclera clear  ENMT:  Moist mucous membranes, Good dentition, No lesions  NECK: Supple, No JVD, Normal thyroid  NERVOUS SYSTEM:  Alert & Oriented X3, Good concentration; Motor Strength 5/5 B/L upper and lower extremities;   CHEST/LUNG: Clear to percussion bilaterally; No rales, rhonchi, wheezing, or rubs  HEART: A-Fib; Systolic murmur, rubs, or gallops  ABDOMEN: Soft, Nontender, Nondistended; Bowel sounds present  EXTREMITIES:  2+ Peripheral Pulses, No clubbing, cyanosis, or edema  LYMPH: No lymphadenopathy noted  SKIN: No rashes or lesions    LABS:                        7.3    12.62 )-----------( 321      ( 01 Dec 2019 08:07 )             24.0           PT/INR - ( 01 Dec 2019 08:07 )   PT: 27.6 sec;   INR: 2.40 ratio             CAPILLARY BLOOD GLUCOSE          RADIOLOGY & ADDITIONAL TESTS:    Imaging Personally Reviewed:  [ ] YES  [ ] NO    Consultant(s) Notes Reviewed:  [ ] YES  [ ] NO    Care Discussed with Consultants/Other Providers [ ] YES  [ ] NO    PROBLEMS:  GI BLEED  SHORTNESS OF BREATH  GI bleed  Atrial fibrillation, unspecified type  Gastrointestinal hemorrhage with melena  Mitral valve replaced  Longstanding persistent atrial fibrillation  Pulmonary hypertension  MIGUEL (obstructive sleep apnea)  Chronic obstructive pulmonary disease, unspecified COPD type  Gout  Essential hypertension  Gastrointestinal hemorrhage associated with anorectal source      Care discussed with family,         [  ]   yes  [  ]  No    imp:    stable[ ]    unstable[  ]     improving [   ]       unchanged  [  ]                Plans:  Continue present plans  [  ]               New consult [  ]   specialty  .......               order maribell[  ]    test name.                  Discharge Planning  [  ]

## 2019-12-02 LAB
HCT VFR BLD CALC: 29.7 % — LOW (ref 34.5–45)
HGB BLD-MCNC: 9.4 G/DL — LOW (ref 11.5–15.5)
MCHC RBC-ENTMCNC: 29.5 PG — SIGNIFICANT CHANGE UP (ref 27–34)
MCHC RBC-ENTMCNC: 31.6 GM/DL — LOW (ref 32–36)
MCV RBC AUTO: 93.1 FL — SIGNIFICANT CHANGE UP (ref 80–100)
NRBC # BLD: 0 /100 WBCS — SIGNIFICANT CHANGE UP (ref 0–0)
PLATELET # BLD AUTO: 314 K/UL — SIGNIFICANT CHANGE UP (ref 150–400)
RBC # BLD: 3.19 M/UL — LOW (ref 3.8–5.2)
RBC # FLD: 15.9 % — HIGH (ref 10.3–14.5)
WBC # BLD: 12.14 K/UL — HIGH (ref 3.8–10.5)
WBC # FLD AUTO: 12.14 K/UL — HIGH (ref 3.8–10.5)

## 2019-12-02 RX ADMIN — Medication 25 MILLIGRAM(S): at 06:01

## 2019-12-02 RX ADMIN — Medication 100 MILLIGRAM(S): at 17:04

## 2019-12-02 RX ADMIN — Medication 500 MICROGRAM(S): at 11:03

## 2019-12-02 RX ADMIN — SIMVASTATIN 5 MILLIGRAM(S): 20 TABLET, FILM COATED ORAL at 21:51

## 2019-12-02 RX ADMIN — Medication 40 MILLIGRAM(S): at 06:01

## 2019-12-02 RX ADMIN — Medication 0.12 MILLIGRAM(S): at 06:01

## 2019-12-02 RX ADMIN — Medication 20 MILLIEQUIVALENT(S): at 11:08

## 2019-12-02 RX ADMIN — Medication 500 MICROGRAM(S): at 23:56

## 2019-12-02 RX ADMIN — Medication 500 MICROGRAM(S): at 05:44

## 2019-12-02 RX ADMIN — PANTOPRAZOLE SODIUM 40 MILLIGRAM(S): 20 TABLET, DELAYED RELEASE ORAL at 06:01

## 2019-12-02 RX ADMIN — Medication 100 MILLIGRAM(S): at 06:01

## 2019-12-02 RX ADMIN — PANTOPRAZOLE SODIUM 40 MILLIGRAM(S): 20 TABLET, DELAYED RELEASE ORAL at 17:04

## 2019-12-02 NOTE — PROGRESS NOTE ADULT - SUBJECTIVE AND OBJECTIVE BOX
Gastroenterology  Patient seen and examined bedside resting comfortably.  No complaints offered, feeling back to baseline. Reports a small black bowel movement this morning but improved and not tarry.     T(F): 98.1 (12-02-19 @ 16:26), Max: 99.2 (12-01-19 @ 21:15)  HR: 76 (12-02-19 @ 16:26) (76 - 94)  BP: 127/62 (12-02-19 @ 16:26) (106/53 - 152/62)  RR: 17 (12-02-19 @ 16:26) (16 - 18)  SpO2: 100% (12-02-19 @ 16:26) (98% - 100%)  Wt(kg): --  CAPILLARY BLOOD GLUCOSE          PHYSICAL EXAM:  General: NAD, obese  Neuro:  Alert & responsive  HEENT: NCAT, EOMI, conjunctiva clear  CV: +S1+S2 regular rate and rhythm  Lung: mild course anterior breath sounds, NC in place, nonlabored breathing   Abdomen: soft, Non Tender, No distention Normal active BS  Extremities: no cyanosis, clubbing or edema    LABS:                        9.4    12.14 )-----------( 314      ( 02 Dec 2019 06:46 )             29.7       PT/INR - ( 01 Dec 2019 08:07 )   PT: 27.6 sec;   INR: 2.40 ratio           I&O's Detail

## 2019-12-02 NOTE — PROGRESS NOTE ADULT - SUBJECTIVE AND OBJECTIVE BOX
Patient is a 69y old  Female who presents with a chief complaint of Lethargy, SOB (01 Dec 2019 14:43)      INTERVAL HPI/OVERNIGHT EVENTS:    MEDICATIONS  (STANDING):  allopurinol 100 milliGRAM(s) Oral two times a day  digoxin     Tablet 0.125 milliGRAM(s) Oral daily  furosemide    Tablet 40 milliGRAM(s) Oral daily  ipratropium    for Nebulization 500 MICROGram(s) Nebulizer every 6 hours  metoprolol succinate ER 25 milliGRAM(s) Oral daily  pantoprazole  Injectable 40 milliGRAM(s) IV Push two times a day  potassium chloride    Tablet ER 20 milliEquivalent(s) Oral daily  simvastatin 5 milliGRAM(s) Oral at bedtime    MEDICATIONS  (PRN):  sodium chloride 0.65% Nasal 1 Spray(s) Both Nostrils four times a day PRN Nasal Congestion      Allergies    No Known Allergies    Intolerances        REVIEW OF SYSTEMS:  CONSTITUTIONAL: No fever, weight loss, or fatigue  EYES: No eye pain, visual disturbances, or discharge  ENMT:  No difficulty hearing, tinnitus, vertigo; No sinus or throat pain  NECK: No pain or stiffness  BREASTS: No pain, masses, or nipple discharge  RESPIRATORY: No cough, wheezing, chills or hemoptysis; No shortness of breath  CARDIOVASCULAR: No chest pain, palpitations, dizziness, or leg swelling  GASTROINTESTINAL: No abdominal or epigastric pain. No nausea, vomiting, or hematemesis; No diarrhea or constipation. No melena or hematochezia.  GENITOURINARY: No dysuria, frequency, hematuria, or incontinence  NEUROLOGICAL: No headaches, memory loss, loss of strength, numbness, or tremors  SKIN: No itching, burning, rashes, or lesions   LYMPH NODES: No enlarged glands  ENDOCRINE: No heat or cold intolerance; No hair loss  MUSCULOSKELETAL: No joint pain or swelling; No muscle, back, or extremity pain  PSYCHIATRIC: No depression, anxiety, mood swings, or difficulty sleeping  HEME/LYMPH: No easy bruising, or bleeding gums  ALLERGY AND IMMUNOLOGIC: No hives or eczema    Vital Signs Last 24 Hrs  T(C): 36.6 (02 Dec 2019 03:30), Max: 37.3 (01 Dec 2019 16:35)  T(F): 97.9 (02 Dec 2019 03:30), Max: 99.2 (01 Dec 2019 21:15)  HR: 78 (02 Dec 2019 06:40) (76 - 94)  BP: 145/61 (02 Dec 2019 03:30) (106/53 - 152/62)  BP(mean): --  RR: 18 (02 Dec 2019 03:30) (16 - 18)  SpO2: 98% (02 Dec 2019 06:40) (98% - 100%)    PHYSICAL EXAM:  GENERAL: NAD, well-groomed, well-developed. Obese.  HEAD:  Atraumatic, Normocephalic  EYES: EOMI, PERRL, conjunctiva and sclera clear  ENMT:  Moist mucous membranes, Good dentition, No lesions  NECK: Supple, No JVD, Normal thyroid  NERVOUS SYSTEM:  Alert & Oriented X3, Good concentration; Motor Strength 5/5 B/L upper and lower extremities; DTRs 2+ intact and symmetric  CHEST/LUNG: Clear to percussion bilaterally; No rales, rhonchi, wheezing, or rubs  HEART: Regular rate and rhythm; No murmurs, rubs, or gallops  ABDOMEN: Soft, Nontender, Nondistended; Bowel sounds present  EXTREMITIES:  2+ Peripheral Pulses, No clubbing, cyanosis, or edema  LYMPH: No lymphadenopathy noted  SKIN: No rashes or lesions    LABS:                        9.4    12.14 )-----------( 314      ( 02 Dec 2019 06:46 )             29.7           PT/INR - ( 01 Dec 2019 08:07 )   PT: 27.6 sec;   INR: 2.40 ratio             CAPILLARY BLOOD GLUCOSE      RADIOLOGY & ADDITIONAL TESTS:    Imaging Personally Reviewed:  [ ] YES  [ ] NO    Consultant(s) Notes Reviewed:  [ ] YES  [ ] NO    Care Discussed with Consultants/Other Providers [ ] YES  [ ] NO    PROBLEMS:  GI BLEED  SHORTNESS OF BREATH  GI bleed  Atrial fibrillation, unspecified type  Gastrointestinal hemorrhage with melena  Mitral valve replaced  Longstanding persistent atrial fibrillation  Pulmonary hypertension  MIGUEL (obstructive sleep apnea)  Chronic obstructive pulmonary disease, unspecified COPD type  Gout  Essential hypertension  Gastrointestinal hemorrhage associated with anorectal source      Care discussed with family,         [  ]   yes  [  ]  No    imp:    stable[ ]    unstable[  ]     improving [   ]       unchanged  [  ]                Plans:  Continue present plans  [  ]               New consult [  ]   specialty  .......               order maribell[  ]    test name.                  Discharge Planning  [  ]

## 2019-12-02 NOTE — PROGRESS NOTE ADULT - SUBJECTIVE AND OBJECTIVE BOX
INTERVAL HPI:  69 year female with HTN, A fib, Mitral & Tricuspid valve replacement, CHF, COPD(never smoked) on home O2, Obesity, MIGUEL(did not tolerate CPAP), pHTN, Gout  Brought by EMS c/o progressively sob,  dizziness for two weeks,  unable to walk about 1/2 block. Increases  sob to laid down and decreases to sit up. Denies cough, chest pain, nausea, vomiting, fever, chills, abd. Also c/o black stool in the past one to two weeks, stopped taking iron pills three weeks ago.  Never smoked.    OVERNIGHT EVENTS:  Got 3 units of PRBC yesterday.    Vital Signs Last 24 Hrs  T(C): 36.7 (02 Dec 2019 16:26), Max: 37.3 (01 Dec 2019 20:59)  T(F): 98.1 (02 Dec 2019 16:26), Max: 99.2 (01 Dec 2019 21:15)  HR: 76 (02 Dec 2019 16:26) (76 - 94)  BP: 127/62 (02 Dec 2019 16:26) (106/53 - 152/62)  BP(mean): --  RR: 17 (02 Dec 2019 16:26) (16 - 18)  SpO2: 100% (02 Dec 2019 16:26) (98% - 100%)    PHYSICAL EXAM:  GEN:         Awake, responsive and comfortable.  HEENT:    Normal.    RESP:        no wheezing.  CVS:          Regular rate and rhythm.   ABD:         Soft, non-tender, non-distended;     MEDICATIONS  (STANDING):  allopurinol 100 milliGRAM(s) Oral two times a day  digoxin     Tablet 0.125 milliGRAM(s) Oral daily  furosemide    Tablet 40 milliGRAM(s) Oral daily  ipratropium    for Nebulization 500 MICROGram(s) Nebulizer every 6 hours  metoprolol succinate ER 25 milliGRAM(s) Oral daily  pantoprazole  Injectable 40 milliGRAM(s) IV Push two times a day  potassium chloride    Tablet ER 20 milliEquivalent(s) Oral daily  simvastatin 5 milliGRAM(s) Oral at bedtime    MEDICATIONS  (PRN):  sodium chloride 0.65% Nasal 1 Spray(s) Both Nostrils four times a day PRN Nasal Congestion    LABS:                        9.4    12.14 )-----------( 314      ( 02 Dec 2019 06:46 )             29.7     PT/INR - ( 01 Dec 2019 08:07 )   PT: 27.6 sec;   INR: 2.40 ratio      11-27 @ 09:20  pH: 7.38  pCO2: 54  pO2: 57  SaO2: 88    ASSESSMENT AND PLAN:  ·	SOB, multifactorial.  ·	Chronic hypoxic hypercarbic Respiratory failure.  ·	CHF by history.  ·	pHTN.  ·	Atrial Fibrillation.  ·	S/P Mitral and Tricuspid Valve replacement.  ·	Severe Anemia.  ·	Suspect GI bleed.  ·	Renal Insuffiencey.  ·	Obesity.  ·	MIGUEL, did not tolerate CPAP.    Continue diuretics O2 and nebulizer.  Possible EGD.

## 2019-12-02 NOTE — PROGRESS NOTE ADULT - ASSESSMENT
69 AAM with PMHx MVR, TVR, afib on coumadin, HTN, CHF, COPD on home O2, pulmonary hypertension, morbid obesity who presents to the hospital with symptomatic anemia secondary to overt GI bleeding (melena), likely upper GI source.     VSS on presentation. Labs reveal Hg 5.3, BUN 84, Cr 1.54, INR 3.8.     Likely upper GI bleeding in the setting of melena, anemia, elevated BUN. Differential includes PUD, AVM, dieulafoy, esophagitis/gastritis.   **Given comorbidities she is very high risk for sedation; would advise for cardiac clearance prior to endoscopic evaluation    11/29: Hg 6.5 s/p 3 units pRBC, repeat Hg pending. Denies any active bleeding   11/30: HGB 8.0 yesterday, today's pending, No signs of active bleeding  12/1: HGB 7.3, drifting downwards. INR 2.4. No signs of active bleeding  12/2: Hg 9.4 s/p 3 units (total 7 units). No signs of active bleeding. Long discussion with cardiology Dr. Castillo that she is very high risk for sedation given valvulopathy, obesity, severe COPD On home O2, CHF. Would avoid sedation unless active hemorrhoids

## 2019-12-02 NOTE — PROGRESS NOTE ADULT - ASSESSMENT
IMPROVE VTE Individual Risk Assessment    RISK                                                                Points    [  ] Previous VTE                                                  3    [  ] Thrombophilia                                               2    [  ] Lower limb paralysis                                      2        (unable to hold up >15 seconds)      [  ] Current Cancer                                              2         (within 6 months)    [x  ] Immobilization > 24 hrs                                1    [  ] ICU/CCU stay > 24 hours                              1    [x  ] Age > 60                                                      1    IMPROVE VTE Score _2________    IMPROVE Score 0-1: Low Risk, No VTE prophylaxis required for most patients, encourage ambulation.   IMPROVE Score 2-3: At risk, pharmacologic VTE prophylaxis is indicated for most patients (in the absence of a contraindication)  IMPROVE Score > or = 4: High Risk, pharmacologic VTE prophylaxis is indicated for most patients (in the absence of a contraindication)    No Phar. A/C 2* GIB  11/28/19 : Asymptomatic at rest. Receiving blood transfusion. Obese. Clear lungs. RSR. GI consult.  11/29/19 : No overt bleeding. repeat Hb pending. Spoke to the cardiologist, will wait for INR to normalize before clearing for EGD if needed.  11/30/19 : Hb 8.0 No overt bleeding. INR therapeutic. Follow CBC. Pt. asymptomatic  12/01//19 : Mild SOB. Hb. trending down, transfuse 1 unit PRBC. INR 2.40. Continue monitoring Hb.  12/02/19 : Asymptomatic. S/P Transfusion. Monitor CBC. Hb 9.4.

## 2019-12-03 LAB
HCT VFR BLD CALC: 28 % — LOW (ref 34.5–45)
HGB BLD-MCNC: 8.5 G/DL — LOW (ref 11.5–15.5)
INR BLD: 1.35 RATIO — HIGH (ref 0.88–1.16)
MCHC RBC-ENTMCNC: 29 PG — SIGNIFICANT CHANGE UP (ref 27–34)
MCHC RBC-ENTMCNC: 30.4 GM/DL — LOW (ref 32–36)
MCV RBC AUTO: 95.6 FL — SIGNIFICANT CHANGE UP (ref 80–100)
NRBC # BLD: 0 /100 WBCS — SIGNIFICANT CHANGE UP (ref 0–0)
PLATELET # BLD AUTO: 307 K/UL — SIGNIFICANT CHANGE UP (ref 150–400)
PROTHROM AB SERPL-ACNC: 15.3 SEC — HIGH (ref 10–12.9)
RBC # BLD: 2.93 M/UL — LOW (ref 3.8–5.2)
RBC # FLD: 15.9 % — HIGH (ref 10.3–14.5)
WBC # BLD: 11.74 K/UL — HIGH (ref 3.8–10.5)
WBC # FLD AUTO: 11.74 K/UL — HIGH (ref 3.8–10.5)

## 2019-12-03 RX ADMIN — Medication 100 MILLIGRAM(S): at 05:41

## 2019-12-03 RX ADMIN — SIMVASTATIN 5 MILLIGRAM(S): 20 TABLET, FILM COATED ORAL at 22:42

## 2019-12-03 RX ADMIN — Medication 500 MICROGRAM(S): at 05:26

## 2019-12-03 RX ADMIN — Medication 25 MILLIGRAM(S): at 05:42

## 2019-12-03 RX ADMIN — Medication 500 MICROGRAM(S): at 17:05

## 2019-12-03 RX ADMIN — Medication 500 MICROGRAM(S): at 11:02

## 2019-12-03 RX ADMIN — Medication 20 MILLIEQUIVALENT(S): at 11:45

## 2019-12-03 RX ADMIN — Medication 0.12 MILLIGRAM(S): at 05:41

## 2019-12-03 RX ADMIN — Medication 100 MILLIGRAM(S): at 17:24

## 2019-12-03 RX ADMIN — PANTOPRAZOLE SODIUM 40 MILLIGRAM(S): 20 TABLET, DELAYED RELEASE ORAL at 05:41

## 2019-12-03 RX ADMIN — Medication 40 MILLIGRAM(S): at 05:42

## 2019-12-03 RX ADMIN — PANTOPRAZOLE SODIUM 40 MILLIGRAM(S): 20 TABLET, DELAYED RELEASE ORAL at 17:24

## 2019-12-03 NOTE — PROGRESS NOTE ADULT - ASSESSMENT
IMPROVE VTE Individual Risk Assessment    RISK                                                                Points    [  ] Previous VTE                                                  3    [  ] Thrombophilia                                               2    [  ] Lower limb paralysis                                      2        (unable to hold up >15 seconds)      [  ] Current Cancer                                              2         (within 6 months)    [x  ] Immobilization > 24 hrs                                1    [  ] ICU/CCU stay > 24 hours                              1    [x  ] Age > 60                                                      1    IMPROVE VTE Score _2________    IMPROVE Score 0-1: Low Risk, No VTE prophylaxis required for most patients, encourage ambulation.   IMPROVE Score 2-3: At risk, pharmacologic VTE prophylaxis is indicated for most patients (in the absence of a contraindication)  IMPROVE Score > or = 4: High Risk, pharmacologic VTE prophylaxis is indicated for most patients (in the absence of a contraindication)    No Phar. A/C 2* GIB  11/28/19 : Asymptomatic at rest. Receiving blood transfusion. Obese. Clear lungs. RSR. GI consult.  11/29/19 : No overt bleeding. repeat Hb pending. Spoke to the cardiologist, will wait for INR to normalize before clearing for EGD if needed.  11/30/19 : Hb 8.0 No overt bleeding. INR therapeutic. Follow CBC. Pt. asymptomatic  12/01//19 : Mild SOB. Hb. trending down, transfuse 1 unit PRBC. INR 2.40. Continue monitoring Hb.  12/02/19 : Asymptomatic. S/P Transfusion. Monitor CBC. Hb 9.4.  12/03/19 : Still has dark stools. Hb. dropped to 8.5 . Spoke to Dr. Manning, may transfer to [facility].

## 2019-12-03 NOTE — PROGRESS NOTE ADULT - SUBJECTIVE AND OBJECTIVE BOX
Gastroenterology  Patient seen and examined bedside resting comfortably.  She reports a firm black stool this morning, x 1. She denies any hematochezia. She denies any fevers, chills, dyphagia, odynophagia, nausea/vomiting.     T(F): 97.8 (12-03-19 @ 10:48), Max: 98.2 (12-02-19 @ 23:53)  HR: 66 (12-03-19 @ 11:21) (66 - 84)  BP: 100/49 (12-03-19 @ 10:48) (94/62 - 127/62)  RR: 16 (12-03-19 @ 10:48) (16 - 17)  SpO2: 98% (12-03-19 @ 11:21) (97% - 100%)  Wt(kg): --  CAPILLARY BLOOD GLUCOSE      PHYSICAL EXAM:  General: NAD, morbidly obese  Neuro:  Alert & responsive  HEENT: NCAT, EOMI, conjunctiva clear, NC in place   CV: +S1+S2 regular rate and rhythm  Lung: clear to ausculation bilaterally, respirations nonlabored, good inspiratory effort  Abdomen: soft, Non Tender, No distention Normal active BS  Extremities: no cyanosis, clubbing or edema    LABS:                        8.5    11.74 )-----------( 307      ( 03 Dec 2019 07:46 )             28.0             PT/INR - ( 03 Dec 2019 07:46 )   PT: 15.3 sec;   INR: 1.35 ratio           I&O's Detail    02 Dec 2019 07:01  -  03 Dec 2019 07:00  --------------------------------------------------------  IN:    Oral Fluid: 358 mL  Total IN: 358 mL    OUT:    Voided: 800 mL  Total OUT: 800 mL    Total NET: -442 mL

## 2019-12-03 NOTE — PROGRESS NOTE ADULT - ASSESSMENT
69 AAM with PMHx MVR, TVR, afib on coumadin, HTN, CHF, COPD on home O2, pulmonary hypertension, morbid obesity who presents to the hospital with symptomatic anemia secondary to overt GI bleeding (melena), likely upper GI source.     VSS on presentation. Labs reveal Hg 5.3, BUN 84, Cr 1.54, INR 3.8.     Likely upper GI bleeding in the setting of melena, anemia, elevated BUN. Differential includes PUD, AVM, dieulafoy, esophagitis/gastritis.   **Given comorbidities she is very high risk for sedation; would advise for cardiac clearance prior to endoscopic evaluation    11/29: Hg 6.5 s/p 3 units pRBC, repeat Hg pending. Denies any active bleeding   11/30: HGB 8.0 yesterday, today's pending, No signs of active bleeding  12/1: HGB 7.3, drifting downwards. INR 2.4. No signs of active bleeding  12/2: Hg 9.4 s/p 3 units (total 7 units). No signs of active bleeding. Long discussion with cardiology Dr. Castillo that she is very high risk for sedation given valvulopathy, obesity, severe COPD On home O2, CHF. Would avoid sedation unless active hemorrhoids  12/3: Hg 8.5. 1 firm black bowel movement. INR 1.35    ***Given need to go back onto anticoagulation, would advise endoscopic evaluation prior to restarting coumadin. Had a discussion with Dr. Castillo who noted too high risk for sedation and advised to hold off. Would advise documentation from cardiology to state that she is too high risk for procedure as this discussion was only over the phone.

## 2019-12-03 NOTE — CONSULT NOTE ADULT - ASSESSMENT
A/P Preop for EGD : She is at high risk for respiratory complications during EGD due to the combination of MIGUEL, severe pulmonary hypertension and oxygen dpendence, Careful attention should be paid to her oxygenation and fluid status during the procedure

## 2019-12-03 NOTE — PROGRESS NOTE ADULT - SUBJECTIVE AND OBJECTIVE BOX
Patient is a 69y old  Female who presents with a chief complaint of Lethargy, SOB (02 Dec 2019 17:15)      INTERVAL HPI/OVERNIGHT EVENTS:    MEDICATIONS  (STANDING):  allopurinol 100 milliGRAM(s) Oral two times a day  digoxin     Tablet 0.125 milliGRAM(s) Oral daily  furosemide    Tablet 40 milliGRAM(s) Oral daily  ipratropium    for Nebulization 500 MICROGram(s) Nebulizer every 6 hours  metoprolol succinate ER 25 milliGRAM(s) Oral daily  pantoprazole  Injectable 40 milliGRAM(s) IV Push two times a day  potassium chloride    Tablet ER 20 milliEquivalent(s) Oral daily  simvastatin 5 milliGRAM(s) Oral at bedtime    MEDICATIONS  (PRN):  sodium chloride 0.65% Nasal 1 Spray(s) Both Nostrils four times a day PRN Nasal Congestion      Allergies    No Known Allergies    Intolerances        REVIEW OF SYSTEMS:  CONSTITUTIONAL: No fever, weight loss, or fatigue  EYES: No eye pain, visual disturbances, or discharge  ENMT:  No difficulty hearing, tinnitus, vertigo; No sinus or throat pain  NECK: No pain or stiffness  BREASTS: No pain, masses, or nipple discharge  RESPIRATORY: No cough, wheezing, chills or hemoptysis; No shortness of breath  CARDIOVASCULAR: No chest pain, palpitations, dizziness, or leg swelling  GASTROINTESTINAL: No abdominal or epigastric pain. No nausea, vomiting, or hematemesis; No diarrhea or constipation. No melena or hematochezia.  GENITOURINARY: No dysuria, frequency, hematuria, or incontinence  NEUROLOGICAL: No headaches, memory loss, loss of strength, numbness, or tremors  SKIN: No itching, burning, rashes, or lesions   LYMPH NODES: No enlarged glands  ENDOCRINE: No heat or cold intolerance; No hair loss  MUSCULOSKELETAL: No joint pain or swelling; No muscle, back, or extremity pain  PSYCHIATRIC: No depression, anxiety, mood swings, or difficulty sleeping  HEME/LYMPH: No easy bruising, or bleeding gums  ALLERGY AND IMMUNOLOGIC: No hives or eczema    Vital Signs Last 24 Hrs  T(C): 36.6 (03 Dec 2019 10:48), Max: 36.8 (02 Dec 2019 23:53)  T(F): 97.8 (03 Dec 2019 10:48), Max: 98.2 (02 Dec 2019 23:53)  HR: 83 (03 Dec 2019 10:48) (69 - 84)  BP: 100/49 (03 Dec 2019 10:48) (94/62 - 127/62)  BP(mean): --  RR: 16 (03 Dec 2019 10:48) (16 - 17)  SpO2: 99% (03 Dec 2019 10:48) (97% - 100%)    PHYSICAL EXAM:  GENERAL: NAD, well-groomed, well-developed, Obese.  HEAD:  Atraumatic, Normocephalic  EYES: EOMI, PERRL, conjunctiva and sclera clear  ENMT:  Moist mucous membranes, Good dentition, No lesions  NECK: Supple, No JVD, Normal thyroid  NERVOUS SYSTEM:  Alert & Oriented X3, Good concentration; Motor Strength 4/5 B/L upper and lower extremities; DTRs 2+ intact and symmetric  CHEST/LUNG: Clear to percussion bilaterally; No rales, rhonchi, wheezing, or rubs  HEART: Regular rate and rhythm; No murmurs, rubs, or gallops  ABDOMEN: Soft, Nontender, Nondistended; Bowel sounds present  EXTREMITIES:  2+ Peripheral Pulses, No clubbing, cyanosis, or edema  LYMPH: No lymphadenopathy noted  SKIN: No rashes or lesions    LABS:                        8.5    11.74 )-----------( 307      ( 03 Dec 2019 07:46 )             28.0           PT/INR - ( 03 Dec 2019 07:46 )   PT: 15.3 sec;   INR: 1.35 ratio             CAPILLARY BLOOD GLUCOSE                        RADIOLOGY & ADDITIONAL TESTS:    Imaging Personally Reviewed:  [x ] YES  [ ] NO    Consultant(s) Notes Reviewed:  [ ] YES  [ ] NO    Care Discussed with Consultants/Other Providers [x ] YES  [ ] NO    PROBLEMS:  GI BLEED  SHORTNESS OF BREATH  GI bleed  Atrial fibrillation, unspecified type  Gastrointestinal hemorrhage with melena  Mitral valve replaced  Longstanding persistent atrial fibrillation  Pulmonary hypertension  MIGUEL (obstructive sleep apnea)  Chronic obstructive pulmonary disease, unspecified COPD type  Gout  Essential hypertension  Gastrointestinal hemorrhage associated with anorectal source      Care discussed with family,         [  ]   yes  [  ]  No    imp:    stable[ ]    unstable[  ]     improving [   ]       unchanged  [  ]                Plans:  Continue present plans  [  ]               New consult [  ]   specialty  .......               order maribell[  ]    test name.                  Discharge Planning  [  ]

## 2019-12-03 NOTE — PROGRESS NOTE ADULT - SUBJECTIVE AND OBJECTIVE BOX
INTERVAL HPI:  69 year female with HTN, A fib, Mitral & Tricuspid valve replacement, CHF, COPD(never smoked) on home O2, Obesity, MIGUEL(did not tolerate CPAP), pHTN, Gout  Brought by EMS c/o progressively sob,  dizziness for two weeks,  unable to walk about 1/2 block. Increases  sob to laid down and decreases to sit up. Denies cough, chest pain, nausea, vomiting, fever, chills, abd. Also c/o black stool in the past one to two weeks, stopped taking iron pills three weeks ago.  Never smoked.    OVERNIGHT EVENTS:  Breathing is better.    Vital Signs Last 24 Hrs  T(C): 36.6 (03 Dec 2019 10:48), Max: 36.8 (02 Dec 2019 23:53)  T(F): 97.8 (03 Dec 2019 10:48), Max: 98.2 (02 Dec 2019 23:53)  HR: 66 (03 Dec 2019 11:21) (66 - 84)  BP: 100/49 (03 Dec 2019 10:48) (94/62 - 127/62)  BP(mean): --  RR: 16 (03 Dec 2019 10:48) (16 - 17)  SpO2: 98% (03 Dec 2019 11:21) (97% - 100%)    PHYSICAL EXAM:  GEN:         Awake, responsive and comfortable.  HEENT:    Normal.    RESP:        no wheezing.  CVS:          Regular rate and rhythm.   ABD:         Soft, non-tender, non-distended;     MEDICATIONS  (STANDING):  allopurinol 100 milliGRAM(s) Oral two times a day  digoxin     Tablet 0.125 milliGRAM(s) Oral daily  furosemide    Tablet 40 milliGRAM(s) Oral daily  ipratropium    for Nebulization 500 MICROGram(s) Nebulizer every 6 hours  metoprolol succinate ER 25 milliGRAM(s) Oral daily  pantoprazole  Injectable 40 milliGRAM(s) IV Push two times a day  potassium chloride    Tablet ER 20 milliEquivalent(s) Oral daily  simvastatin 5 milliGRAM(s) Oral at bedtime    MEDICATIONS  (PRN):  sodium chloride 0.65% Nasal 1 Spray(s) Both Nostrils four times a day PRN Nasal Congestion    LABS:                        8.5    11.74 )-----------( 307      ( 03 Dec 2019 07:46 )             28.0     PT/INR - ( 03 Dec 2019 07:46 )   PT: 15.3 sec;   INR: 1.35 ratio      11-27 @ 09:20  pH: 7.38  pCO2: 54  pO2: 57  SaO2: 88    ASSESSMENT AND PLAN:  ·	SOB, multifactorial.  ·	Chronic hypoxic hypercarbic Respiratory failure.  ·	CHF by history.  ·	pHTN.  ·	Atrial Fibrillation.  ·	S/P Mitral and Tricuspid Valve replacement.  ·	Severe Anemia.  ·	Suspect GI bleed.  ·	Renal Insuffiencey.  ·	Obesity.  ·	MIGUEL, did not tolerate CPAP.    Pulmonary close to base line.  Continue O2, nebulizer and diuretics.

## 2019-12-03 NOTE — CONSULT NOTE ADULT - SUBJECTIVE AND OBJECTIVE BOX
68 YO lady with MS and MR S/P MVR with mechanical prosthesis and tricuspid annuloplasty placement. Presented with GI bleed and needs EGD to identify source of bleeding. INR was 3.8 on presentation  She denies chest pain but has dyspnea on exertion.   She is oxygen dependent. Her other medical problems include severe MIGUEL Afib and pulmonary hypertension    PMH  As above    FH/SH : non contributory    ROS; ongoing melanotic stools  Dyspnea on minimal exertion     GENERAL: NAD,. OBese  HEAD:  Atraumatic, Normocephalic  EYES: EOMI, PERRL, conjunctiva and sclera clear  ENMT:  Moist mucous membranes,, No lesions  NECK: Supple, No JVD, Normal thyroid  NERVOUS SYSTEM:  Alert & Oriented X3, Good concentration;   CHEST/LUNG: Clear to percussion bilaterally; No rales, rhonchi, wheezing, or rubs  HEART: IRRegular rate and rhythm; No murmurs, rubs, or gallops  ABDOMEN: Soft, Nontender, Nondistended; Bowel sounds present  EXTREMITIES:  2+ Peripheral Pulses, No clubbing, cyanosis, or edema  LYMPH: No lymphadenopathy noted  SKIN: No rashes or lesions   ECG : Afib at 70/min. Normal axis. RBBB  Labs H/H: 8.5/28. WBC 11.74 Plt 308 INR : 1.35  Cr : 1.7    Recent echo : limited window. LV function appears normal . ELIZABETH is pending

## 2019-12-04 LAB
ALBUMIN SERPL ELPH-MCNC: 2.6 G/DL — LOW (ref 3.3–5)
ALP SERPL-CCNC: 92 U/L — SIGNIFICANT CHANGE UP (ref 40–120)
ALT FLD-CCNC: 13 U/L — SIGNIFICANT CHANGE UP (ref 12–78)
ANION GAP SERPL CALC-SCNC: 4 MMOL/L — LOW (ref 5–17)
AST SERPL-CCNC: 17 U/L — SIGNIFICANT CHANGE UP (ref 15–37)
BILIRUB SERPL-MCNC: 0.3 MG/DL — SIGNIFICANT CHANGE UP (ref 0.2–1.2)
BUN SERPL-MCNC: 55 MG/DL — HIGH (ref 7–23)
CALCIUM SERPL-MCNC: 8.5 MG/DL — SIGNIFICANT CHANGE UP (ref 8.5–10.1)
CHLORIDE SERPL-SCNC: 103 MMOL/L — SIGNIFICANT CHANGE UP (ref 96–108)
CO2 SERPL-SCNC: 31 MMOL/L — SIGNIFICANT CHANGE UP (ref 22–31)
CREAT SERPL-MCNC: 1.44 MG/DL — HIGH (ref 0.5–1.3)
GLUCOSE SERPL-MCNC: 103 MG/DL — HIGH (ref 70–99)
HCT VFR BLD CALC: 26.1 % — LOW (ref 34.5–45)
HGB BLD-MCNC: 7.9 G/DL — LOW (ref 11.5–15.5)
INR BLD: 1.24 RATIO — HIGH (ref 0.88–1.16)
MCHC RBC-ENTMCNC: 28.7 PG — SIGNIFICANT CHANGE UP (ref 27–34)
MCHC RBC-ENTMCNC: 30.3 GM/DL — LOW (ref 32–36)
MCV RBC AUTO: 94.9 FL — SIGNIFICANT CHANGE UP (ref 80–100)
NRBC # BLD: 0 /100 WBCS — SIGNIFICANT CHANGE UP (ref 0–0)
PLATELET # BLD AUTO: 307 K/UL — SIGNIFICANT CHANGE UP (ref 150–400)
POTASSIUM SERPL-MCNC: 4.8 MMOL/L — SIGNIFICANT CHANGE UP (ref 3.5–5.3)
POTASSIUM SERPL-SCNC: 4.8 MMOL/L — SIGNIFICANT CHANGE UP (ref 3.5–5.3)
PROT SERPL-MCNC: 6.9 GM/DL — SIGNIFICANT CHANGE UP (ref 6–8.3)
PROTHROM AB SERPL-ACNC: 14 SEC — HIGH (ref 10–12.9)
RBC # BLD: 2.75 M/UL — LOW (ref 3.8–5.2)
RBC # FLD: 15.3 % — HIGH (ref 10.3–14.5)
SODIUM SERPL-SCNC: 138 MMOL/L — SIGNIFICANT CHANGE UP (ref 135–145)
WBC # BLD: 11.19 K/UL — HIGH (ref 3.8–10.5)
WBC # FLD AUTO: 11.19 K/UL — HIGH (ref 3.8–10.5)

## 2019-12-04 RX ADMIN — Medication 0.12 MILLIGRAM(S): at 06:28

## 2019-12-04 RX ADMIN — Medication 100 MILLIGRAM(S): at 06:28

## 2019-12-04 RX ADMIN — SIMVASTATIN 5 MILLIGRAM(S): 20 TABLET, FILM COATED ORAL at 21:20

## 2019-12-04 RX ADMIN — Medication 40 MILLIGRAM(S): at 06:27

## 2019-12-04 RX ADMIN — Medication 500 MICROGRAM(S): at 17:15

## 2019-12-04 RX ADMIN — Medication 500 MICROGRAM(S): at 12:12

## 2019-12-04 RX ADMIN — Medication 25 MILLIGRAM(S): at 06:27

## 2019-12-04 RX ADMIN — PANTOPRAZOLE SODIUM 40 MILLIGRAM(S): 20 TABLET, DELAYED RELEASE ORAL at 06:28

## 2019-12-04 RX ADMIN — Medication 100 MILLIGRAM(S): at 17:24

## 2019-12-04 RX ADMIN — Medication 500 MICROGRAM(S): at 05:37

## 2019-12-04 RX ADMIN — PANTOPRAZOLE SODIUM 40 MILLIGRAM(S): 20 TABLET, DELAYED RELEASE ORAL at 17:23

## 2019-12-04 RX ADMIN — Medication 20 MILLIEQUIVALENT(S): at 12:25

## 2019-12-04 NOTE — PROGRESS NOTE ADULT - SUBJECTIVE AND OBJECTIVE BOX
INTERVAL HPI:   69 year female with HTN, A fib, Mitral & Tricuspid valve replacement, CHF, COPD(never smoked) on home O2, Obesity, MIGUEL(did not tolerate CPAP), pHTN, Gout  Brought by EMS c/o progressively sob,  dizziness for two weeks,  unable to walk about 1/2 block. Increases  sob to laid down and decreases to sit up. Denies cough, chest pain, nausea, vomiting, fever, chills, abd. Also c/o black stool in the past one to two weeks, stopped taking iron pills three weeks ago.  Never smoked.    OVERNIGHT EVENTS:  Breathing is better but still with melanotic stool.    Vital Signs Last 24 Hrs  T(C): 37.1 (04 Dec 2019 11:52), Max: 37.1 (04 Dec 2019 05:25)  T(F): 98.8 (04 Dec 2019 11:52), Max: 98.8 (04 Dec 2019 05:25)  HR: 69 (04 Dec 2019 12:51) (69 - 89)  BP: 114/60 (04 Dec 2019 11:52) (114/60 - 128/60)  BP(mean): --  RR: 18 (04 Dec 2019 11:52) (18 - 18)  SpO2: 98% (04 Dec 2019 12:51) (97% - 99%)    PHYSICAL EXAM:  GEN:         Awake, responsive and comfortable.  HEENT:    Normal.    RESP:        no wheezing.  CVS:          Regular rate and rhythm.   ABD:         Soft, non-tender, non-distended;     MEDICATIONS  (STANDING):  allopurinol 100 milliGRAM(s) Oral two times a day  digoxin     Tablet 0.125 milliGRAM(s) Oral daily  furosemide    Tablet 40 milliGRAM(s) Oral daily  ipratropium    for Nebulization 500 MICROGram(s) Nebulizer every 6 hours  metoprolol succinate ER 25 milliGRAM(s) Oral daily  pantoprazole  Injectable 40 milliGRAM(s) IV Push two times a day  potassium chloride    Tablet ER 20 milliEquivalent(s) Oral daily  simvastatin 5 milliGRAM(s) Oral at bedtime    MEDICATIONS  (PRN):  sodium chloride 0.65% Nasal 1 Spray(s) Both Nostrils four times a day PRN Nasal Congestion    LABS:                        7.9    11.19 )-----------( 307      ( 04 Dec 2019 06:20 )             26.1     12-04    138  |  103  |  55<H>  ----------------------------<  103<H>  4.8   |  31  |  1.44<H>    Ca    8.5      04 Dec 2019 06:20    TPro  6.9  /  Alb  2.6<L>  /  TBili  0.3  /  DBili  x   /  AST  17  /  ALT  13  /  AlkPhos  92  12-04    PT/INR - ( 04 Dec 2019 06:20 )   PT: 14.0 sec;   INR: 1.24 ratio       ASSESSMENT AND PLAN:  ·	SOB, multifactorial.  ·	Chronic hypoxic hypercarbic Respiratory failure.  ·	CHF by history.  ·	pHTN.  ·	Atrial Fibrillation.  ·	S/P Mitral and Tricuspid Valve replacement.  ·	Severe Anemia.  ·	Suspect GI bleed.  ·	Renal Insuffiencey.  ·	Obesity.  ·	MIGUEL, did not tolerate CPAP.    Pulmonary close to base line.  Continue O2, nebulizer and diuretics.  Acceptable risk  from for EGD.

## 2019-12-04 NOTE — PROGRESS NOTE ADULT - SUBJECTIVE AND OBJECTIVE BOX
Patient is a 69y old  Female who presents with a chief complaint of Lethargy, SOB (03 Dec 2019 18:55)      INTERVAL HPI/OVERNIGHT EVENTS:    MEDICATIONS  (STANDING):  allopurinol 100 milliGRAM(s) Oral two times a day  digoxin     Tablet 0.125 milliGRAM(s) Oral daily  furosemide    Tablet 40 milliGRAM(s) Oral daily  ipratropium    for Nebulization 500 MICROGram(s) Nebulizer every 6 hours  metoprolol succinate ER 25 milliGRAM(s) Oral daily  pantoprazole  Injectable 40 milliGRAM(s) IV Push two times a day  potassium chloride    Tablet ER 20 milliEquivalent(s) Oral daily  simvastatin 5 milliGRAM(s) Oral at bedtime    MEDICATIONS  (PRN):  sodium chloride 0.65% Nasal 1 Spray(s) Both Nostrils four times a day PRN Nasal Congestion      Allergies    No Known Allergies    Intolerances        REVIEW OF SYSTEMS:  CONSTITUTIONAL: No fever, weight loss, or fatigue  EYES: No eye pain, visual disturbances, or discharge  ENMT:  No difficulty hearing, tinnitus, vertigo; No sinus or throat pain  NECK: No pain or stiffness  BREASTS: No pain, masses, or nipple discharge  RESPIRATORY: No cough, wheezing, chills or hemoptysis; No shortness of breath  CARDIOVASCULAR: No chest pain, palpitations, dizziness, or leg swelling  GASTROINTESTINAL: No abdominal or epigastric pain. No nausea, vomiting, or hematemesis; No diarrhea or constipation. No melena or hematochezia.  GENITOURINARY: No dysuria, frequency, hematuria, or incontinence  NEUROLOGICAL: No headaches, memory loss, loss of strength, numbness, or tremors  SKIN: No itching, burning, rashes, or lesions   LYMPH NODES: No enlarged glands  ENDOCRINE: No heat or cold intolerance; No hair loss  MUSCULOSKELETAL: No joint pain or swelling; No muscle, back, or extremity pain  PSYCHIATRIC: No depression, anxiety, mood swings, or difficulty sleeping  HEME/LYMPH: No easy bruising, or bleeding gums  ALLERGY AND IMMUNOLOGIC: No hives or eczema    Vital Signs Last 24 Hrs  T(C): 37.1 (04 Dec 2019 05:25), Max: 37.1 (04 Dec 2019 05:25)  T(F): 98.8 (04 Dec 2019 05:25), Max: 98.8 (04 Dec 2019 05:25)  HR: 88 (04 Dec 2019 06:09) (66 - 89)  BP: 121/67 (04 Dec 2019 05:25) (100/49 - 128/60)  BP(mean): --  RR: 18 (04 Dec 2019 05:25) (16 - 18)  SpO2: 98% (04 Dec 2019 06:09) (97% - 99%)    PHYSICAL EXAM:  GENERAL: NAD, well-groomed, well-developed. Obese  HEAD:  Atraumatic, Normocephalic  EYES: EOMI, PERRL, conjunctiva and sclera clear  ENMT:  Moist mucous membranes, Good dentition, No lesions  NECK: Supple, No JVD, Normal thyroid  NERVOUS SYSTEM:  Alert & Oriented X3, Good concentration; Motor Strength 5/5 B/L upper and lower extremities; DTRs 2+ intact and symmetric  CHEST/LUNG: Clear to percussion bilaterally; No rales, rhonchi, wheezing, or rubs  HEART: Regular rate and rhythm; Systolic murmur, metallic click., no rubs, or gallops  ABDOMEN: Soft, Nontender, Nondistended; Bowel sounds present  EXTREMITIES:  2+ Peripheral Pulses, No clubbing, cyanosis, or edema  LYMPH: No lymphadenopathy noted  SKIN: No rashes or lesions    LABS:                        7.9    11.19 )-----------( 307      ( 04 Dec 2019 06:20 )             26.1     12-04    138  |  103  |  55<H>  ----------------------------<  103<H>  4.8   |  31  |  1.44<H>    Ca    8.5      04 Dec 2019 06:20    TPro  6.9  /  Alb  2.6<L>  /  TBili  0.3  /  DBili  x   /  AST  17  /  ALT  13  /  AlkPhos  92  12-04    PT/INR - ( 04 Dec 2019 06:20 )   PT: 14.0 sec;   INR: 1.24 ratio             CAPILLARY BLOOD GLUCOSE          RADIOLOGY & ADDITIONAL TESTS:    Imaging Personally Reviewed:  [ ] YES  [ ] NO    Consultant(s) Notes Reviewed:  [ ] YES  [ ] NO    Care Discussed with Consultants/Other Providers [ ] YES  [ ] NO    PROBLEMS:  GI BLEED  SHORTNESS OF BREATH  GI bleed  Atrial fibrillation, unspecified type  Gastrointestinal hemorrhage with melena  Mitral valve replaced  Longstanding persistent atrial fibrillation  Pulmonary hypertension  MIGUEL (obstructive sleep apnea)  Chronic obstructive pulmonary disease, unspecified COPD type  Gout  Essential hypertension  Gastrointestinal hemorrhage associated with anorectal source      Care discussed with family,         [  ]   yes  [  ]  No    imp:    stable[ ]    unstable[x  ]     improving [   ]       unchanged  [  ]                Plans:  Continue present plans  [  ]               New consult [  ]   specialty  .......               order maribell[  ]    test name.                  Discharge Planning  [  ]

## 2019-12-04 NOTE — PROGRESS NOTE ADULT - SUBJECTIVE AND OBJECTIVE BOX
Gastroenterology  Patient seen and examined bedside resting comfortably.  No complaints offered.   One black formed bowel movement today this am, otherwise no further bowel movements, no hematochezia.     T(F): 98.8 (12-04-19 @ 11:52), Max: 98.8 (12-04-19 @ 05:25)  HR: 69 (12-04-19 @ 12:51) (69 - 89)  BP: 114/60 (12-04-19 @ 11:52) (114/60 - 128/60)  RR: 18 (12-04-19 @ 11:52) (18 - 18)  SpO2: 98% (12-04-19 @ 12:51) (97% - 99%)  Wt(kg): --  CAPILLARY BLOOD GLUCOSE        PHYSICAL EXAM:  General: NAD, morbid obesity   Neuro:  Alert & responsive  HEENT: NCAT, EOMI, conjunctiva clear, NC in place  CV: +S1+S2 regular rate and rhythm  Lung: clear to ausculation bilaterally, respirations nonlabored, good inspiratory effort  Abdomen: soft, Non Tender, No distention Normal active BS  Extremities: no cyanosis, clubbing or edema    LABS:                        7.9    11.19 )-----------( 307      ( 04 Dec 2019 06:20 )             26.1     12-04    138  |  103  |  55<H>  ----------------------------<  103<H>  4.8   |  31  |  1.44<H>    Ca    8.5      04 Dec 2019 06:20    TPro  6.9  /  Alb  2.6<L>  /  TBili  0.3  /  DBili  x   /  AST  17  /  ALT  13  /  AlkPhos  92  12-04    LIVER FUNCTIONS - ( 04 Dec 2019 06:20 )  Alb: 2.6 g/dL / Pro: 6.9 gm/dL / ALK PHOS: 92 U/L / ALT: 13 U/L / AST: 17 U/L / GGT: x           PT/INR - ( 04 Dec 2019 06:20 )   PT: 14.0 sec;   INR: 1.24 ratio           I&O's Detail    03 Dec 2019 07:01  -  04 Dec 2019 07:00  --------------------------------------------------------  IN:    Oral Fluid: 600 mL  Total IN: 600 mL    OUT:    Voided: 350 mL  Total OUT: 350 mL    Total NET: 250 mL      04 Dec 2019 07:01  -  04 Dec 2019 15:04  --------------------------------------------------------  IN:    Oral Fluid: 660 mL  Total IN: 660 mL    OUT:  Total OUT: 0 mL    Total NET: 660 mL        12-04 @ 06:20    138 | 103 | 55  /8.5 | -- | --  _______________________/  4.8 | 31 | 1.44                           \par

## 2019-12-04 NOTE — PROGRESS NOTE ADULT - ASSESSMENT
69 AAM with PMHx MVR, TVR, afib on coumadin, HTN, CHF, COPD on home O2, pulmonary hypertension, morbid obesity who presents to the hospital with symptomatic anemia secondary to overt GI bleeding (melena), likely upper GI source.     VSS on presentation. Labs reveal Hg 5.3, BUN 84, Cr 1.54, INR 3.8.     Likely upper GI bleeding in the setting of melena, anemia, elevated BUN. Differential includes PUD, AVM, dieulafoy, esophagitis/gastritis.   **Given comorbidities she is very high risk for sedation; advise for cardiac clearance and anesthia evaluation prior to endoscopic evaluation    11/29: Hg 6.5 s/p 3 units pRBC, repeat Hg pending. Denies any active bleeding   11/30: HGB 8.0 yesterday, today's pending, No signs of active bleeding  12/1: HGB 7.3, drifting downwards. INR 2.4. No signs of active bleeding  12/2: Hg 9.4 s/p 3 units (total 7 units). No signs of active bleeding. Long discussion with cardiology Dr. Castillo that she is very high risk for sedation given valvulopathy, obesity, severe COPD On home O2, CHF. Would avoid sedation unless active hemorrhoids  12/3: Hg 8.5. 1 firm black bowel movement. INR 1.35  12/4: Hg 7.9. 1 formed black bowel movement.     ***Given need to go back onto anticoagulation, would advise endoscopic evaluation prior to restarting coumadin. Discussed risk of procedure with patient in detail including bleeding, aspiration, infection, perforation, death.

## 2019-12-04 NOTE — PROGRESS NOTE ADULT - ASSESSMENT
IMPROVE VTE Individual Risk Assessment    RISK                                                                Points    [  ] Previous VTE                                                  3    [  ] Thrombophilia                                               2    [  ] Lower limb paralysis                                      2        (unable to hold up >15 seconds)      [  ] Current Cancer                                              2         (within 6 months)    [x  ] Immobilization > 24 hrs                                1    [  ] ICU/CCU stay > 24 hours                              1    [x  ] Age > 60                                                      1    IMPROVE VTE Score _2________    IMPROVE Score 0-1: Low Risk, No VTE prophylaxis required for most patients, encourage ambulation.   IMPROVE Score 2-3: At risk, pharmacologic VTE prophylaxis is indicated for most patients (in the absence of a contraindication)  IMPROVE Score > or = 4: High Risk, pharmacologic VTE prophylaxis is indicated for most patients (in the absence of a contraindication)    No Phar. A/C 2* GIB  11/28/19 : Asymptomatic at rest. Receiving blood transfusion. Obese. Clear lungs. RSR. GI consult.  11/29/19 : No overt bleeding. repeat Hb pending. Spoke to the cardiologist, will wait for INR to normalize before clearing for EGD if needed.  11/30/19 : Hb 8.0 No overt bleeding. INR therapeutic. Follow CBC. Pt. asymptomatic  12/01//19 : Mild SOB. Hb. trending down, transfuse 1 unit PRBC. INR 2.40. Continue monitoring Hb.  12/02/19 : Asymptomatic. S/P Transfusion. Monitor CBC. Hb 9.4.  12/03/19 : Still has dark stools. Hb. dropped to 8.5 . Spoke to Dr. Manning, may transfer to [facility].  12/04/19 : Pt. alert. Melanotic stool present. Hb dropped again. Cardiology consult noted, cleared for EGD. GI f/u.

## 2019-12-05 LAB
ANION GAP SERPL CALC-SCNC: 4 MMOL/L — LOW (ref 5–17)
BUN SERPL-MCNC: 52 MG/DL — HIGH (ref 7–23)
CALCIUM SERPL-MCNC: 9 MG/DL — SIGNIFICANT CHANGE UP (ref 8.5–10.1)
CHLORIDE SERPL-SCNC: 102 MMOL/L — SIGNIFICANT CHANGE UP (ref 96–108)
CO2 SERPL-SCNC: 33 MMOL/L — HIGH (ref 22–31)
CREAT SERPL-MCNC: 1.6 MG/DL — HIGH (ref 0.5–1.3)
GLUCOSE SERPL-MCNC: 147 MG/DL — HIGH (ref 70–99)
HCT VFR BLD CALC: 27.5 % — LOW (ref 34.5–45)
HGB BLD-MCNC: 8.2 G/DL — LOW (ref 11.5–15.5)
MCHC RBC-ENTMCNC: 28.9 PG — SIGNIFICANT CHANGE UP (ref 27–34)
MCHC RBC-ENTMCNC: 29.8 GM/DL — LOW (ref 32–36)
MCV RBC AUTO: 96.8 FL — SIGNIFICANT CHANGE UP (ref 80–100)
NRBC # BLD: 0 /100 WBCS — SIGNIFICANT CHANGE UP (ref 0–0)
PLATELET # BLD AUTO: 341 K/UL — SIGNIFICANT CHANGE UP (ref 150–400)
POTASSIUM SERPL-MCNC: 4.7 MMOL/L — SIGNIFICANT CHANGE UP (ref 3.5–5.3)
POTASSIUM SERPL-SCNC: 4.7 MMOL/L — SIGNIFICANT CHANGE UP (ref 3.5–5.3)
RBC # BLD: 2.84 M/UL — LOW (ref 3.8–5.2)
RBC # FLD: 15 % — HIGH (ref 10.3–14.5)
SODIUM SERPL-SCNC: 139 MMOL/L — SIGNIFICANT CHANGE UP (ref 135–145)
WBC # BLD: 11.71 K/UL — HIGH (ref 3.8–10.5)
WBC # FLD AUTO: 11.71 K/UL — HIGH (ref 3.8–10.5)

## 2019-12-05 RX ADMIN — PANTOPRAZOLE SODIUM 40 MILLIGRAM(S): 20 TABLET, DELAYED RELEASE ORAL at 17:01

## 2019-12-05 RX ADMIN — Medication 500 MICROGRAM(S): at 17:25

## 2019-12-05 RX ADMIN — Medication 500 MICROGRAM(S): at 11:38

## 2019-12-05 RX ADMIN — Medication 25 MILLIGRAM(S): at 06:43

## 2019-12-05 RX ADMIN — Medication 500 MICROGRAM(S): at 00:24

## 2019-12-05 RX ADMIN — SIMVASTATIN 5 MILLIGRAM(S): 20 TABLET, FILM COATED ORAL at 21:58

## 2019-12-05 RX ADMIN — Medication 40 MILLIGRAM(S): at 06:43

## 2019-12-05 RX ADMIN — Medication 100 MILLIGRAM(S): at 17:01

## 2019-12-05 RX ADMIN — Medication 100 MILLIGRAM(S): at 06:43

## 2019-12-05 RX ADMIN — PANTOPRAZOLE SODIUM 40 MILLIGRAM(S): 20 TABLET, DELAYED RELEASE ORAL at 06:43

## 2019-12-05 RX ADMIN — Medication 500 MICROGRAM(S): at 05:14

## 2019-12-05 RX ADMIN — Medication 20 MILLIEQUIVALENT(S): at 11:14

## 2019-12-05 RX ADMIN — Medication 0.12 MILLIGRAM(S): at 06:43

## 2019-12-05 NOTE — PHYSICAL THERAPY INITIAL EVALUATION ADULT - BALANCE TRAINING, PT EVAL
GOAL: pt will be able to independently ambulate 150ft with straight cane without loss of balance within 4 weeks

## 2019-12-05 NOTE — PROGRESS NOTE ADULT - SUBJECTIVE AND OBJECTIVE BOX
INTERVAL HPI:  69 year female with HTN, A fib, Mitral & Tricuspid valve replacement, CHF, COPD(never smoked) on home O2, Obesity, MIGUEL(did not tolerate CPAP), pHTN, Gout  Brought by EMS c/o progressively sob,  dizziness for two weeks,  unable to walk about 1/2 block. Increases  sob to laid down and decreases to sit up. Denies cough, chest pain, nausea, vomiting, fever, chills, abd. Also c/o black stool in the past one to two weeks, stopped taking iron pills three weeks ago.  Never smoked.    OVERNIGHT EVENTS:  Comfortable    Vital Signs Last 24 Hrs  T(C): 36.9 (05 Dec 2019 05:05), Max: 37.4 (04 Dec 2019 16:43)  T(F): 98.4 (05 Dec 2019 05:05), Max: 99.3 (04 Dec 2019 16:43)  HR: 75 (05 Dec 2019 06:00) (68 - 83)  BP: 122/89 (05 Dec 2019 05:05) (108/66 - 136/73)  BP(mean): --  RR: 18 (05 Dec 2019 05:05) (18 - 18)  SpO2: 97% (05 Dec 2019 06:00) (96% - 100%)    PHYSICAL EXAM:  GEN:         Awake, responsive and comfortable.  HEENT:    Normal.    RESP:        no wheezing.  CVS:           Regular rate and rhythm.   ABD:         Soft, non-tender, non-distended;     MEDICATIONS  (STANDING):  allopurinol 100 milliGRAM(s) Oral two times a day  digoxin     Tablet 0.125 milliGRAM(s) Oral daily  furosemide    Tablet 40 milliGRAM(s) Oral daily  ipratropium    for Nebulization 500 MICROGram(s) Nebulizer every 6 hours  metoprolol succinate ER 25 milliGRAM(s) Oral daily  pantoprazole  Injectable 40 milliGRAM(s) IV Push two times a day  potassium chloride    Tablet ER 20 milliEquivalent(s) Oral daily  simvastatin 5 milliGRAM(s) Oral at bedtime    MEDICATIONS  (PRN):  sodium chloride 0.65% Nasal 1 Spray(s) Both Nostrils four times a day PRN Nasal Congestion    LABS:                        8.2    11.71 )-----------( 341      ( 05 Dec 2019 10:44 )             27.5     12-05    139  |  102  |  52<H>  ----------------------------<  147<H>  4.7   |  33<H>  |  1.60<H>    Ca    9.0      05 Dec 2019 10:44    TPro  6.9  /  Alb  2.6<L>  /  TBili  0.3  /  DBili  x   /  AST  17  /  ALT  13  /  AlkPhos  92  12-04    PT/INR - ( 04 Dec 2019 06:20 )   PT: 14.0 sec;   INR: 1.24 ratio        ASSESSMENT AND PLAN:  ·	SOB, multifactorial.  ·	Chronic hypoxic hypercarbic Respiratory failure.  ·	CHF by history.  ·	pHTN.  ·	Atrial Fibrillation.  ·	S/P Mitral and Tricuspid Valve replacement.  ·	Severe Anemia.  ·	Suspect GI bleed.  ·	Renal Insuffiencey.  ·	Obesity.  ·	MIGUEL, did not tolerate CPAP.    Pulmonary close to base line.  Continue O2, nebulizer and diuretics.  Acceptable risk  from for EGD.

## 2019-12-05 NOTE — PROGRESS NOTE ADULT - ASSESSMENT
69 AAM with PMHx MVR, TVR, afib on coumadin, HTN, CHF, COPD on home O2, pulmonary hypertension, morbid obesity who presents to the hospital with symptomatic anemia secondary to overt GI bleeding (melena), likely upper GI source.     VSS on presentation. Labs reveal Hg 5.3, BUN 84, Cr 1.54, INR 3.8.     Likely upper GI bleeding in the setting of melena, anemia, elevated BUN. Differential includes PUD, AVM, dieulafoy, esophagitis/gastritis.   **Given comorbidities she is very high risk for sedation; advise for cardiac clearance and anesthia evaluation prior to endoscopic evaluation    11/29: Hg 6.5 s/p 3 units pRBC, repeat Hg pending. Denies any active bleeding   11/30: HGB 8.0 yesterday, today's pending, No signs of active bleeding  12/1: HGB 7.3, drifting downwards. INR 2.4. No signs of active bleeding  12/2: Hg 9.4 s/p 3 units (total 7 units). No signs of active bleeding. Long discussion with cardiology Dr. Castillo that she is very high risk for sedation given valvulopathy, obesity, severe COPD On home O2, CHF. Would avoid sedation unless active hemorrhoids  12/3: Hg 8.5. 1 firm black bowel movement. INR 1.35  12/4: Hg 7.9. 1 formed black bowel movement.   12/5: labs pending     ***Given need to go back onto anticoagulation, would advise endoscopic evaluation prior to restarting coumadin. Discussed risk of procedure with patient in detail including bleeding, aspiration, infection, perforation, death. 69 AAM with PMHx MVR, TVR, afib on coumadin, HTN, CHF, COPD on home O2, pulmonary hypertension, morbid obesity who presents to the hospital with symptomatic anemia secondary to overt GI bleeding (melena), likely upper GI source.     VSS on presentation. Labs reveal Hg 5.3, BUN 84, Cr 1.54, INR 3.8.     Likely upper GI bleeding in the setting of melena, anemia, elevated BUN. Differential includes PUD, AVM, dieulafoy, esophagitis/gastritis.   **Given comorbidities she is very high risk for sedation; advise for cardiac clearance and anesthia evaluation prior to endoscopic evaluation    11/29: Hg 6.5 s/p 3 units pRBC, repeat Hg pending. Denies any active bleeding   11/30: HGB 8.0 yesterday, today's pending, No signs of active bleeding  12/1: HGB 7.3, drifting downwards. INR 2.4. No signs of active bleeding  12/2: Hg 9.4 s/p 3 units (total 7 units). No signs of active bleeding. Long discussion with cardiology Dr. Castillo that she is very high risk for sedation given valvulopathy, obesity, severe COPD On home O2, CHF. Would avoid sedation unless active hemorrhoids  12/3: Hg 8.5. 1 firm black bowel movement. INR 1.35  12/4: Hg 7.9. 1 formed black bowel movement.   12/5: Hg 8.2. No stools today.     ***Given need to go back onto anticoagulation, would advise endoscopic evaluation prior to restarting coumadin. Discussed risk of procedure with patient in detail including bleeding, aspiration, infection, perforation, death.

## 2019-12-05 NOTE — PHYSICAL THERAPY INITIAL EVALUATION ADULT - PERTINENT HX OF CURRENT PROBLEM, REHAB EVAL
Pt is a 68yo F admitted 2/2 GIB. Pt is now s/p 7 units PRBCs total during hospitalization. H/H today: 8.2/27.5.

## 2019-12-05 NOTE — PHYSICAL THERAPY INITIAL EVALUATION ADULT - ADDITIONAL COMMENTS
Pt lives with her spouse, 2 daughters, & several grandchildren in a private home. Pt states there are 4 entry steps (+ rail). Pt states she stays on the main floor once inside. Pt states prior to admission she was independent with all functional mobility including household ambulation with straight cane. Pt states hse is right hand dominant & uses 3L O2 at all times. Pt states she performs ADL's independently & has a shower chair a well. Goal of therapy: return home & feel better.

## 2019-12-05 NOTE — PROGRESS NOTE ADULT - ASSESSMENT
IMPROVE VTE Individual Risk Assessment    RISK                                                                Points    [  ] Previous VTE                                                  3    [  ] Thrombophilia                                               2    [  ] Lower limb paralysis                                      2        (unable to hold up >15 seconds)      [  ] Current Cancer                                              2         (within 6 months)    [x  ] Immobilization > 24 hrs                                1    [  ] ICU/CCU stay > 24 hours                              1    [x  ] Age > 60                                                      1    IMPROVE VTE Score _2________    IMPROVE Score 0-1: Low Risk, No VTE prophylaxis required for most patients, encourage ambulation.   IMPROVE Score 2-3: At risk, pharmacologic VTE prophylaxis is indicated for most patients (in the absence of a contraindication)  IMPROVE Score > or = 4: High Risk, pharmacologic VTE prophylaxis is indicated for most patients (in the absence of a contraindication)    No Phar. A/C 2* GIB  11/28/19 : Asymptomatic at rest. Receiving blood transfusion. Obese. Clear lungs. RSR. GI consult.  11/29/19 : No overt bleeding. repeat Hb pending. Spoke to the cardiologist, will wait for INR to normalize before clearing for EGD if needed.  11/30/19 : Hb 8.0 No overt bleeding. INR therapeutic. Follow CBC. Pt. asymptomatic  12/01//19 : Mild SOB. Hb. trending down, transfuse 1 unit PRBC. INR 2.40. Continue monitoring Hb.  12/02/19 : Asymptomatic. S/P Transfusion. Monitor CBC. Hb 9.4.  12/03/19 : Still has dark stools. Hb. dropped to 8.5 . Spoke to Dr. Manning, may transfer to [facility].  12/04/19 : Pt. alert. Melanotic stool present. Hb dropped again. Cardiology consult noted, cleared for EGD. GI f/u.  12/05/19 : Alert, asymptomatic. Follow CBC. BUN improved. GI F/U.

## 2019-12-05 NOTE — PROGRESS NOTE ADULT - SUBJECTIVE AND OBJECTIVE BOX
Patient is a 69y old  Female who presents with a chief complaint of Lethargy, SOB (04 Dec 2019 15:47)      INTERVAL HPI/OVERNIGHT EVENTS:    MEDICATIONS  (STANDING):  allopurinol 100 milliGRAM(s) Oral two times a day  digoxin     Tablet 0.125 milliGRAM(s) Oral daily  furosemide    Tablet 40 milliGRAM(s) Oral daily  ipratropium    for Nebulization 500 MICROGram(s) Nebulizer every 6 hours  metoprolol succinate ER 25 milliGRAM(s) Oral daily  pantoprazole  Injectable 40 milliGRAM(s) IV Push two times a day  potassium chloride    Tablet ER 20 milliEquivalent(s) Oral daily  simvastatin 5 milliGRAM(s) Oral at bedtime    MEDICATIONS  (PRN):  sodium chloride 0.65% Nasal 1 Spray(s) Both Nostrils four times a day PRN Nasal Congestion      Allergies    No Known Allergies    Intolerances        REVIEW OF SYSTEMS:  CONSTITUTIONAL: No fever, weight loss, or fatigue  EYES: No eye pain, visual disturbances, or discharge  ENMT:  No difficulty hearing, tinnitus, vertigo; No sinus or throat pain  NECK: No pain or stiffness  BREASTS: No pain, masses, or nipple discharge  RESPIRATORY: No cough, wheezing, chills or hemoptysis; No shortness of breath  CARDIOVASCULAR: No chest pain, palpitations, dizziness, or leg swelling  GASTROINTESTINAL: No abdominal or epigastric pain. No nausea, vomiting, or hematemesis; No diarrhea or constipation. No melena or hematochezia.  GENITOURINARY: No dysuria, frequency, hematuria, or incontinence  NEUROLOGICAL: No headaches, memory loss, loss of strength, numbness, or tremors  SKIN: No itching, burning, rashes, or lesions   LYMPH NODES: No enlarged glands  ENDOCRINE: No heat or cold intolerance; No hair loss  MUSCULOSKELETAL: No joint pain or swelling; No muscle, back, or extremity pain  PSYCHIATRIC: No depression, anxiety, mood swings, or difficulty sleeping  HEME/LYMPH: No easy bruising, or bleeding gums  ALLERGY AND IMMUNOLOGIC: No hives or eczema    Vital Signs Last 24 Hrs  T(C): 36.9 (05 Dec 2019 05:05), Max: 37.4 (04 Dec 2019 16:43)  T(F): 98.4 (05 Dec 2019 05:05), Max: 99.3 (04 Dec 2019 16:43)  HR: 75 (05 Dec 2019 06:00) (68 - 83)  BP: 122/89 (05 Dec 2019 05:05) (108/66 - 136/73)  BP(mean): --  RR: 18 (05 Dec 2019 05:05) (18 - 18)  SpO2: 97% (05 Dec 2019 06:00) (96% - 100%)    PHYSICAL EXAM:  GENERAL: NAD, well-groomed, well-developed, Obese.  HEAD:  Atraumatic, Normocephalic  EYES: EOMI, PERRL, conjunctiva and sclera clear  ENMT:  Moist mucous membranes, Good dentition, No lesions  NECK: Supple, No JVD, Normal thyroid  NERVOUS SYSTEM:  Alert & Oriented X3, Good concentration; Motor Strength 4/5 B/L upper and lower extremities;   CHEST/LUNG: Clear to percussion bilaterally; No rales, rhonchi, wheezing, or rubs  HEART: Regular rate and rhythm; No murmurs, rubs, or gallops  ABDOMEN: Soft, Nontender, Nondistended; Bowel sounds present  EXTREMITIES:  2+ Peripheral Pulses, No clubbing, cyanosis, or edema  LYMPH: No lymphadenopathy noted  SKIN: No rashes or lesions    LABS:                        7.9    11.19 )-----------( 307      ( 04 Dec 2019 06:20 )             26.1     12-04    138  |  103  |  55<H>  ----------------------------<  103<H>  4.8   |  31  |  1.44<H>    Ca    8.5      04 Dec 2019 06:20    TPro  6.9  /  Alb  2.6<L>  /  TBili  0.3  /  DBili  x   /  AST  17  /  ALT  13  /  AlkPhos  92  12-04    PT/INR - ( 04 Dec 2019 06:20 )   PT: 14.0 sec;   INR: 1.24 ratio             CAPILLARY BLOOD GLUCOSE        RADIOLOGY & ADDITIONAL TESTS:    Imaging Personally Reviewed:  [ ] YES  [ ] NO    Consultant(s) Notes Reviewed:  [ ] YES  [ ] NO    Care Discussed with Consultants/Other Providers [ ] YES  [ ] NO    PROBLEMS:  GI BLEED  SHORTNESS OF BREATH  GI bleed  Atrial fibrillation, unspecified type  Gastrointestinal hemorrhage with melena  Mitral valve replaced  Longstanding persistent atrial fibrillation  Pulmonary hypertension  MIGUEL (obstructive sleep apnea)  Chronic obstructive pulmonary disease, unspecified COPD type  Gout  Essential hypertension  Gastrointestinal hemorrhage associated with anorectal source      Care discussed with family,         [  ]   yes  [  ]  No    imp:    stable[ ]    unstable[  ]     improving [   ]       unchanged  [  ]                Plans:  Continue present plans  [  ]               New consult [  ]   specialty  .......               order maribell[  ]    test name.                  Discharge Planning  [  ]

## 2019-12-06 LAB
APTT BLD: 34.2 SEC — SIGNIFICANT CHANGE UP (ref 28.5–37)
HCT VFR BLD CALC: 27 % — LOW (ref 34.5–45)
HGB BLD-MCNC: 8.1 G/DL — LOW (ref 11.5–15.5)
MCHC RBC-ENTMCNC: 29.3 PG — SIGNIFICANT CHANGE UP (ref 27–34)
MCHC RBC-ENTMCNC: 30 GM/DL — LOW (ref 32–36)
MCV RBC AUTO: 97.8 FL — SIGNIFICANT CHANGE UP (ref 80–100)
NRBC # BLD: 0 /100 WBCS — SIGNIFICANT CHANGE UP (ref 0–0)
PLATELET # BLD AUTO: 347 K/UL — SIGNIFICANT CHANGE UP (ref 150–400)
RBC # BLD: 2.76 M/UL — LOW (ref 3.8–5.2)
RBC # FLD: 14.8 % — HIGH (ref 10.3–14.5)
WBC # BLD: 10.93 K/UL — HIGH (ref 3.8–10.5)
WBC # FLD AUTO: 10.93 K/UL — HIGH (ref 3.8–10.5)

## 2019-12-06 RX ORDER — HEPARIN SODIUM 5000 [USP'U]/ML
9000 INJECTION INTRAVENOUS; SUBCUTANEOUS EVERY 6 HOURS
Refills: 0 | Status: DISCONTINUED | OUTPATIENT
Start: 2019-12-06 | End: 2019-12-09

## 2019-12-06 RX ORDER — HEPARIN SODIUM 5000 [USP'U]/ML
INJECTION INTRAVENOUS; SUBCUTANEOUS
Qty: 25000 | Refills: 0 | Status: DISCONTINUED | OUTPATIENT
Start: 2019-12-06 | End: 2019-12-09

## 2019-12-06 RX ORDER — HEPARIN SODIUM 5000 [USP'U]/ML
4000 INJECTION INTRAVENOUS; SUBCUTANEOUS EVERY 6 HOURS
Refills: 0 | Status: DISCONTINUED | OUTPATIENT
Start: 2019-12-06 | End: 2019-12-09

## 2019-12-06 RX ORDER — HEPARIN SODIUM 5000 [USP'U]/ML
9000 INJECTION INTRAVENOUS; SUBCUTANEOUS ONCE
Refills: 0 | Status: COMPLETED | OUTPATIENT
Start: 2019-12-06 | End: 2019-12-06

## 2019-12-06 RX ADMIN — Medication 500 MICROGRAM(S): at 05:57

## 2019-12-06 RX ADMIN — PANTOPRAZOLE SODIUM 40 MILLIGRAM(S): 20 TABLET, DELAYED RELEASE ORAL at 05:50

## 2019-12-06 RX ADMIN — Medication 500 MICROGRAM(S): at 23:26

## 2019-12-06 RX ADMIN — Medication 100 MILLIGRAM(S): at 13:31

## 2019-12-06 RX ADMIN — Medication 20 MILLIEQUIVALENT(S): at 13:30

## 2019-12-06 RX ADMIN — SIMVASTATIN 5 MILLIGRAM(S): 20 TABLET, FILM COATED ORAL at 22:20

## 2019-12-06 RX ADMIN — Medication 500 MICROGRAM(S): at 17:47

## 2019-12-06 RX ADMIN — Medication 25 MILLIGRAM(S): at 13:32

## 2019-12-06 RX ADMIN — Medication 40 MILLIGRAM(S): at 13:32

## 2019-12-06 RX ADMIN — Medication 500 MICROGRAM(S): at 00:43

## 2019-12-06 RX ADMIN — Medication 100 MILLIGRAM(S): at 17:34

## 2019-12-06 RX ADMIN — Medication 500 MICROGRAM(S): at 11:28

## 2019-12-06 RX ADMIN — HEPARIN SODIUM 9000 UNIT(S): 5000 INJECTION INTRAVENOUS; SUBCUTANEOUS at 22:14

## 2019-12-06 RX ADMIN — Medication 0.12 MILLIGRAM(S): at 13:31

## 2019-12-06 RX ADMIN — HEPARIN SODIUM 1900 UNIT(S)/HR: 5000 INJECTION INTRAVENOUS; SUBCUTANEOUS at 22:12

## 2019-12-06 RX ADMIN — PANTOPRAZOLE SODIUM 40 MILLIGRAM(S): 20 TABLET, DELAYED RELEASE ORAL at 17:34

## 2019-12-06 NOTE — PROGRESS NOTE ADULT - SUBJECTIVE AND OBJECTIVE BOX
Patient is a 69y old  Female who presents with a chief complaint of Lethargy, SOB (06 Dec 2019 15:54)      INTERVAL HPI/OVERNIGHT EVENTS:    MEDICATIONS  (STANDING):  allopurinol 100 milliGRAM(s) Oral two times a day  digoxin     Tablet 0.125 milliGRAM(s) Oral daily  furosemide    Tablet 40 milliGRAM(s) Oral daily  ipratropium    for Nebulization 500 MICROGram(s) Nebulizer every 6 hours  metoprolol succinate ER 25 milliGRAM(s) Oral daily  pantoprazole  Injectable 40 milliGRAM(s) IV Push two times a day  potassium chloride    Tablet ER 20 milliEquivalent(s) Oral daily  simvastatin 5 milliGRAM(s) Oral at bedtime    MEDICATIONS  (PRN):  sodium chloride 0.65% Nasal 1 Spray(s) Both Nostrils four times a day PRN Nasal Congestion      Allergies    No Known Allergies    Intolerances        REVIEW OF SYSTEMS:  CONSTITUTIONAL: No fever, weight loss, or fatigue  EYES: No eye pain, visual disturbances, or discharge  ENMT:  No difficulty hearing, tinnitus, vertigo; No sinus or throat pain  NECK: No pain or stiffness  BREASTS: No pain, masses, or nipple discharge  RESPIRATORY: No cough, wheezing, chills or hemoptysis; No shortness of breath  CARDIOVASCULAR: No chest pain, palpitations, dizziness, or leg swelling  GASTROINTESTINAL: No abdominal or epigastric pain. No nausea, vomiting, or hematemesis; No diarrhea or constipation. No melena or hematochezia.  GENITOURINARY: No dysuria, frequency, hematuria, or incontinence  NEUROLOGICAL: No headaches, memory loss, loss of strength, numbness, or tremors  SKIN: No itching, burning, rashes, or lesions   LYMPH NODES: No enlarged glands  ENDOCRINE: No heat or cold intolerance; No hair loss  MUSCULOSKELETAL: No joint pain or swelling; No muscle, back, or extremity pain  PSYCHIATRIC: No depression, anxiety, mood swings, or difficulty sleeping  HEME/LYMPH: No easy bruising, or bleeding gums  ALLERGY AND IMMUNOLOGIC: No hives or eczema    Vital Signs Last 24 Hrs  T(C): 36.9 (06 Dec 2019 17:26), Max: 37 (05 Dec 2019 23:51)  T(F): 98.5 (06 Dec 2019 17:26), Max: 98.6 (05 Dec 2019 23:51)  HR: 72 (06 Dec 2019 17:57) (70 - 86)  BP: 126/66 (06 Dec 2019 17:26) (126/66 - 144/84)  BP(mean): --  RR: 19 (06 Dec 2019 17:26) (17 - 19)  SpO2: 99% (06 Dec 2019 17:57) (96% - 100%)    PHYSICAL EXAM:  GENERAL: NAD, well-groomed, well-developed  HEAD:  Atraumatic, Normocephalic  EYES: EOMI, PERRL, conjunctiva and sclera clear  ENMT:  Moist mucous membranes, Good dentition, No lesions  NECK: Supple, No JVD, Normal thyroid  NERVOUS SYSTEM:  Alert & Oriented X3, Good concentration; Motor Strength 5/5 B/L upper and lower extremities; DTRs 2+ intact and symmetric  CHEST/LUNG: Clear to percussion bilaterally; No rales, rhonchi, wheezing, or rubs  HEART: Regular rate and rhythm; No murmurs, rubs, or gallops  ABDOMEN: Soft, Nontender, Nondistended; Bowel sounds present  EXTREMITIES:  2+ Peripheral Pulses, No clubbing, cyanosis, or edema  LYMPH: No lymphadenopathy noted  SKIN: No rashes or lesions    LABS:                        8.1    10.93 )-----------( 347      ( 06 Dec 2019 13:20 )             27.0     12-05    139  |  102  |  52<H>  ----------------------------<  147<H>  4.7   |  33<H>  |  1.60<H>    Ca    9.0      05 Dec 2019 10:44          CAPILLARY BLOOD GLUCOSE    RADIOLOGY & ADDITIONAL TESTS:    Imaging Personally Reviewed:  [ ] YES  [ ] NO    Consultant(s) Notes Reviewed:  [ ] YES  [ ] NO    Care Discussed with Consultants/Other Providers [ ] YES  [ ] NO    PROBLEMS:  GI BLEED  SHORTNESS OF BREATH  GI bleed  Atrial fibrillation, unspecified type  Gastrointestinal hemorrhage with melena  Mitral valve replaced  Longstanding persistent atrial fibrillation  Pulmonary hypertension  MIGUEL (obstructive sleep apnea)  Chronic obstructive pulmonary disease, unspecified COPD type  Gout  Essential hypertension  Gastrointestinal hemorrhage associated with anorectal source      Care discussed with family,         [  ]   yes  [  ]  No    imp:    stable[ ]    unstable[  ]     improving [   ]       unchanged  [  ]                Plans:  Continue present plans  [  ]               New consult [  ]   specialty  .......               order maribell[  ]    test name.                  Discharge Planning  [  ]

## 2019-12-06 NOTE — PROGRESS NOTE ADULT - ASSESSMENT
69 AAM with PMHx MVR, TVR, afib on coumadin, HTN, CHF, COPD on home O2, pulmonary hypertension, morbid obesity who presents to the hospital with symptomatic anemia secondary to overt GI bleeding (melena), likely upper GI source.     VSS on presentation. Labs reveal Hg 5.3, BUN 84, Cr 1.54, INR 3.8.     Likely upper GI bleeding in the setting of melena, anemia, elevated BUN. Differential includes PUD, AVM, dieulafoy, esophagitis/gastritis.   **Given comorbidities she is very high risk for sedation; advise for cardiac clearance and anesthia evaluation prior to endoscopic evaluation    11/29: Hg 6.5 s/p 3 units pRBC, repeat Hg pending. Denies any active bleeding   11/30: HGB 8.0 yesterday, today's pending, No signs of active bleeding  12/1: HGB 7.3, drifting downwards. INR 2.4. No signs of active bleeding  12/2: Hg 9.4 s/p 3 units (total 7 units). No signs of active bleeding. Long discussion with cardiology Dr. Castillo that she is very high risk for sedation given valvulopathy, obesity, severe COPD On home O2, CHF. Would avoid sedation unless active hemorrhoids  12/3: Hg 8.5. 1 firm black bowel movement. INR 1.35  12/4: Hg 7.9. 1 formed black bowel movement.   12/5: Hg 8.2. No stools today.   12/6: Hg pending. 1 black formed stool. Plan for EGD today however OR cancelled given broken endoscope sterilizer.     ***Given need to go back onto anticoagulation, would advise endoscopic evaluation prior to restarting coumadin. Discussed risk of procedure with patient in detail including bleeding, aspiration, infection, perforation, death.

## 2019-12-06 NOTE — PROGRESS NOTE ADULT - SUBJECTIVE AND OBJECTIVE BOX
Gastroenterology  Patient seen and examined bedside resting comfortably.  No complaints offered.   Formed black bowel movement daily. No other complaints.     T(F): 97.8 (12-06-19 @ 05:55), Max: 98.6 (12-05-19 @ 23:51)  HR: 78 (12-06-19 @ 11:38) (68 - 86)  BP: 144/84 (12-06-19 @ 05:55) (128/67 - 144/84)  RR: 17 (12-06-19 @ 05:55) (17 - 18)  SpO2: 98% (12-06-19 @ 11:38) (96% - 100%)  Wt(kg): --  CAPILLARY BLOOD GLUCOSE      PHYSICAL EXAM:  General: NAD, morbid obese   Neuro:  Alert & responsive  HEENT: NCAT, EOMI, conjunctiva clear, NC in place  CV: +S1+S2 regular rate and rhythm  Lung: clear to ausculation bilaterally, respirations nonlabored, good inspiratory effort  Abdomen: soft, Non Tender, No distention Normal active BS  Extremities: no cyanosis, clubbing or edema    LABS:                        8.2    11.71 )-----------( 341      ( 05 Dec 2019 10:44 )             27.5     12-05    139  |  102  |  52<H>  ----------------------------<  147<H>  4.7   |  33<H>  |  1.60<H>    Ca    9.0      05 Dec 2019 10:44          I&O's Detail    05 Dec 2019 07:01  -  06 Dec 2019 07:00  --------------------------------------------------------  IN:    Oral Fluid: 480 mL  Total IN: 480 mL    OUT:  Total OUT: 0 mL    Total NET: 480 mL        12-05 @ 10:44    139 | 102 | 52  /9.0 | -- | --  _______________________/  4.7 | 33 | 1.60                           \par

## 2019-12-06 NOTE — PROGRESS NOTE ADULT - SUBJECTIVE AND OBJECTIVE BOX
INTERVAL HPI:   69 year female with HTN, A fib, Mitral & Tricuspid valve replacement, CHF, COPD(never smoked) on home O2, Obesity, MIGUEL(did not tolerate CPAP), pHTN, Gout  Brought by EMS c/o progressively sob,  dizziness for two weeks,  unable to walk about 1/2 block. Increases  sob to laid down and decreases to sit up. Denies cough, chest pain, nausea, vomiting, fever, chills, abd. Also c/o black stool in the past one to two weeks, stopped taking iron pills three weeks ago.  Never smoked.    OVERNIGHT EVENTS:  Reports sob with exertion.    Vital Signs Last 24 Hrs  T(C): 36.8 (06 Dec 2019 11:45), Max: 37 (05 Dec 2019 23:51)  T(F): 98.3 (06 Dec 2019 11:45), Max: 98.6 (05 Dec 2019 23:51)  HR: 73 (06 Dec 2019 11:45) (68 - 86)  BP: 133/78 (06 Dec 2019 11:45) (128/67 - 144/84)  BP(mean): --  RR: 18 (06 Dec 2019 11:45) (17 - 18)  SpO2: 99% (06 Dec 2019 11:45) (96% - 100%)    PHYSICAL EXAM:  GEN:         Awake, responsive and comfortable.  HEENT:    Normal.    RESP:       no wheezing.  CVS:          Regular rate and rhythm.   ABD:         Soft, non-tender, non-distended;     MEDICATIONS  (STANDING):  allopurinol 100 milliGRAM(s) Oral two times a day  digoxin     Tablet 0.125 milliGRAM(s) Oral daily  furosemide    Tablet 40 milliGRAM(s) Oral daily  ipratropium    for Nebulization 500 MICROGram(s) Nebulizer every 6 hours  metoprolol succinate ER 25 milliGRAM(s) Oral daily  pantoprazole  Injectable 40 milliGRAM(s) IV Push two times a day  potassium chloride    Tablet ER 20 milliEquivalent(s) Oral daily  simvastatin 5 milliGRAM(s) Oral at bedtime    MEDICATIONS  (PRN):  sodium chloride 0.65% Nasal 1 Spray(s) Both Nostrils four times a day PRN Nasal Congestion    LABS:                        8.1    10.93 )-----------( 347      ( 06 Dec 2019 13:20 )             27.0     12-05    139  |  102  |  52<H>  ----------------------------<  147<H>  4.7   |  33<H>  |  1.60<H>    Ca    9.0      05 Dec 2019 10:44     ASSESSMENT AND PLAN:  ·	SOB, multifactorial.  ·	Chronic hypoxic hypercarbic Respiratory failure.  ·	CHF by history.  ·	pHTN.  ·	Atrial Fibrillation.  ·	S/P Mitral and Tricuspid Valve replacement.  ·	Severe Anemia.  ·	Suspect GI bleed.  ·	Renal Insuffiencey.  ·	Obesity.  ·	MIGUEL, did not tolerate CPAP.    To use extension O2 tubing when going to rest room.  Continue O2, nebulizer and diuretics.  Acceptable risk  from for EGD.

## 2019-12-06 NOTE — PROGRESS NOTE ADULT - ASSESSMENT
IMPROVE VTE Individual Risk Assessment    RISK                                                                Points    [  ] Previous VTE                                                  3    [  ] Thrombophilia                                               2    [  ] Lower limb paralysis                                      2        (unable to hold up >15 seconds)      [  ] Current Cancer                                              2         (within 6 months)    [x  ] Immobilization > 24 hrs                                1    [  ] ICU/CCU stay > 24 hours                              1    [x  ] Age > 60                                                      1    IMPROVE VTE Score _2________    IMPROVE Score 0-1: Low Risk, No VTE prophylaxis required for most patients, encourage ambulation.   IMPROVE Score 2-3: At risk, pharmacologic VTE prophylaxis is indicated for most patients (in the absence of a contraindication)  IMPROVE Score > or = 4: High Risk, pharmacologic VTE prophylaxis is indicated for most patients (in the absence of a contraindication)    No Phar. A/C 2* GIB  11/28/19 : Asymptomatic at rest. Receiving blood transfusion. Obese. Clear lungs. RSR. GI consult.  11/29/19 : No overt bleeding. repeat Hb pending. Spoke to the cardiologist, will wait for INR to normalize before clearing for EGD if needed.  11/30/19 : Hb 8.0 No overt bleeding. INR therapeutic. Follow CBC. Pt. asymptomatic  12/01//19 : Mild SOB. Hb. trending down, transfuse 1 unit PRBC. INR 2.40. Continue monitoring Hb.  12/02/19 : Asymptomatic. S/P Transfusion. Monitor CBC. Hb 9.4.  12/03/19 : Still has dark stools. Hb. dropped to 8.5 . Spoke to Dr. Manning, may transfer to [facility].  12/04/19 : Pt. alert. Melanotic stool present. Hb dropped again. Cardiology consult noted, cleared for EGD. GI f/u.  12/05/19 : Alert, asymptomatic. Follow CBC. BUN improved. GI F/U.  12/06/19 : EGD post poned 2* Strerilization problems. Will need A/C for bridging. Discussed with Dr. Manning, want to start on IV Heparin.  monitor cbc.

## 2019-12-07 LAB
APTT BLD: 116.6 SEC — HIGH (ref 27.5–36.3)
APTT BLD: 127.6 SEC — CRITICAL HIGH (ref 27.5–36.3)
APTT BLD: >200 SEC — CRITICAL HIGH (ref 27.5–36.3)
HCT VFR BLD CALC: 27.4 % — LOW (ref 34.5–45)
HGB BLD-MCNC: 7.9 G/DL — LOW (ref 11.5–15.5)
INR BLD: 1.22 RATIO — HIGH (ref 0.88–1.16)
MCHC RBC-ENTMCNC: 28.7 PG — SIGNIFICANT CHANGE UP (ref 27–34)
MCHC RBC-ENTMCNC: 28.8 GM/DL — LOW (ref 32–36)
MCV RBC AUTO: 99.6 FL — SIGNIFICANT CHANGE UP (ref 80–100)
NRBC # BLD: 0 /100 WBCS — SIGNIFICANT CHANGE UP (ref 0–0)
PLATELET # BLD AUTO: 360 K/UL — SIGNIFICANT CHANGE UP (ref 150–400)
PROTHROM AB SERPL-ACNC: 13.7 SEC — HIGH (ref 10–12.9)
RBC # BLD: 2.75 M/UL — LOW (ref 3.8–5.2)
RBC # FLD: 14.6 % — HIGH (ref 10.3–14.5)
WBC # BLD: 9.78 K/UL — SIGNIFICANT CHANGE UP (ref 3.8–10.5)
WBC # FLD AUTO: 9.78 K/UL — SIGNIFICANT CHANGE UP (ref 3.8–10.5)

## 2019-12-07 RX ADMIN — HEPARIN SODIUM 1200 UNIT(S)/HR: 5000 INJECTION INTRAVENOUS; SUBCUTANEOUS at 23:06

## 2019-12-07 RX ADMIN — Medication 500 MICROGRAM(S): at 11:39

## 2019-12-07 RX ADMIN — Medication 0.12 MILLIGRAM(S): at 06:21

## 2019-12-07 RX ADMIN — PANTOPRAZOLE SODIUM 40 MILLIGRAM(S): 20 TABLET, DELAYED RELEASE ORAL at 17:25

## 2019-12-07 RX ADMIN — Medication 100 MILLIGRAM(S): at 17:24

## 2019-12-07 RX ADMIN — Medication 40 MILLIGRAM(S): at 06:21

## 2019-12-07 RX ADMIN — Medication 500 MICROGRAM(S): at 17:03

## 2019-12-07 RX ADMIN — Medication 25 MILLIGRAM(S): at 06:21

## 2019-12-07 RX ADMIN — Medication 500 MICROGRAM(S): at 05:32

## 2019-12-07 RX ADMIN — HEPARIN SODIUM 0 UNIT(S)/HR: 5000 INJECTION INTRAVENOUS; SUBCUTANEOUS at 07:03

## 2019-12-07 RX ADMIN — PANTOPRAZOLE SODIUM 40 MILLIGRAM(S): 20 TABLET, DELAYED RELEASE ORAL at 06:21

## 2019-12-07 RX ADMIN — SIMVASTATIN 5 MILLIGRAM(S): 20 TABLET, FILM COATED ORAL at 22:20

## 2019-12-07 RX ADMIN — Medication 100 MILLIGRAM(S): at 06:21

## 2019-12-07 RX ADMIN — Medication 20 MILLIEQUIVALENT(S): at 11:07

## 2019-12-07 RX ADMIN — HEPARIN SODIUM 1400 UNIT(S)/HR: 5000 INJECTION INTRAVENOUS; SUBCUTANEOUS at 16:13

## 2019-12-07 RX ADMIN — HEPARIN SODIUM 1600 UNIT(S)/HR: 5000 INJECTION INTRAVENOUS; SUBCUTANEOUS at 08:10

## 2019-12-07 NOTE — PROGRESS NOTE ADULT - SUBJECTIVE AND OBJECTIVE BOX
INTERVAL HPI:  69 year female with HTN, A fib, Mitral & Tricuspid valve replacement, CHF, COPD(never smoked) on home O2, Obesity, MIGUEL(did not tolerate CPAP), pHTN, Gout  Brought by EMS c/o progressively sob,  dizziness for two weeks,  unable to walk about 1/2 block. Increases  sob to laid down and decreases to sit up. Denies cough, chest pain, nausea, vomiting, fever, chills, abd. Also c/o black stool in the past one to two weeks, stopped taking iron pills three weeks ago.  Never smoked.    OVERNIGHT EVENTS:  Breathing is better.    Vital Signs Last 24 Hrs  T(C): 37.1 (07 Dec 2019 11:48), Max: 37.1 (07 Dec 2019 11:48)  T(F): 98.7 (07 Dec 2019 11:48), Max: 98.7 (07 Dec 2019 11:48)  HR: 77 (07 Dec 2019 11:48) (68 - 82)  BP: 128/66 (07 Dec 2019 11:48) (113/65 - 128/66)  BP(mean): --  RR: 20 (07 Dec 2019 11:48) (18 - 20)  SpO2: 97% (07 Dec 2019 11:48) (95% - 100%)    PHYSICAL EXAM:  GEN:         Awake, responsive and comfortable.  HEENT:    Normal.    RESP:        no wheezing.  CVS:           Regular rate and rhythm.   ABD:         Soft, non-tender, non-distended;     MEDICATIONS  (STANDING):  allopurinol 100 milliGRAM(s) Oral two times a day  digoxin     Tablet 0.125 milliGRAM(s) Oral daily  furosemide    Tablet 40 milliGRAM(s) Oral daily  heparin  Infusion.  Unit(s)/Hr (19 mL/Hr) IV Continuous <Continuous>  ipratropium    for Nebulization 500 MICROGram(s) Nebulizer every 6 hours  metoprolol succinate ER 25 milliGRAM(s) Oral daily  pantoprazole  Injectable 40 milliGRAM(s) IV Push two times a day  potassium chloride    Tablet ER 20 milliEquivalent(s) Oral daily  simvastatin 5 milliGRAM(s) Oral at bedtime    MEDICATIONS  (PRN):  heparin  Injectable 9000 Unit(s) IV Push every 6 hours PRN For aPTT less than 40  heparin  Injectable 4000 Unit(s) IV Push every 6 hours PRN For aPTT between 40 - 57  sodium chloride 0.65% Nasal 1 Spray(s) Both Nostrils four times a day PRN Nasal Congestion    LABS:                        7.9    9.78  )-----------( 360      ( 07 Dec 2019 05:50 )             27.4   PT/INR - ( 07 Dec 2019 05:50 )   PT: 13.7 sec;   INR: 1.22 ratio      PTT - ( 07 Dec 2019 05:50 )  PTT:>200.0 sec     ASSESSMENT AND PLAN:  ·	SOB, multifactorial.  ·	Chronic hypoxic hypercarbic Respiratory failure.  ·	CHF by history.  ·	pHTN.  ·	Atrial Fibrillation.  ·	S/P Mitral and Tricuspid Valve replacement.  ·	Severe Anemia.  ·	Suspect GI bleed.  ·	Renal Insuffiencey.  ·	Obesity.  ·	MIGUEL, did not tolerate CPAP.    Continue O2, nebulizer and diuretics.  Awaiting for EGD.  Acceptable risk  from for EGD.

## 2019-12-07 NOTE — PROGRESS NOTE ADULT - ASSESSMENT
IMPROVE VTE Individual Risk Assessment    RISK                                                                Points    [  ] Previous VTE                                                  3    [  ] Thrombophilia                                               2    [  ] Lower limb paralysis                                      2        (unable to hold up >15 seconds)      [  ] Current Cancer                                              2         (within 6 months)    [x  ] Immobilization > 24 hrs                                1    [  ] ICU/CCU stay > 24 hours                              1    [x  ] Age > 60                                                      1    IMPROVE VTE Score _2________    IMPROVE Score 0-1: Low Risk, No VTE prophylaxis required for most patients, encourage ambulation.   IMPROVE Score 2-3: At risk, pharmacologic VTE prophylaxis is indicated for most patients (in the absence of a contraindication)  IMPROVE Score > or = 4: High Risk, pharmacologic VTE prophylaxis is indicated for most patients (in the absence of a contraindication)    No Phar. A/C 2* GIB  11/28/19 : Asymptomatic at rest. Receiving blood transfusion. Obese. Clear lungs. RSR. GI consult.  11/29/19 : No overt bleeding. repeat Hb pending. Spoke to the cardiologist, will wait for INR to normalize before clearing for EGD if needed.  11/30/19 : Hb 8.0 No overt bleeding. INR therapeutic. Follow CBC. Pt. asymptomatic  12/01//19 : Mild SOB. Hb. trending down, transfuse 1 unit PRBC. INR 2.40. Continue monitoring Hb.  12/02/19 : Asymptomatic. S/P Transfusion. Monitor CBC. Hb 9.4.  12/03/19 : Still has dark stools. Hb. dropped to 8.5 . Spoke to Dr. Manning, may transfer to [facility].  12/04/19 : Pt. alert. Melanotic stool present. Hb dropped again. Cardiology consult noted, cleared for EGD. GI f/u.  12/05/19 : Alert, asymptomatic. Follow CBC. BUN improved. GI F/U.  12/06/19 : EGD post poned 2* Strerilization problems. Will need A/C for bridging. Discussed with Dr. Manning, want to start on IV Heparin.  monitor cbc.  12/07/19 : Awake, asymptomatic. On Fuul heparinization. Hb 7.9. Monitor CBC.

## 2019-12-07 NOTE — PROGRESS NOTE ADULT - SUBJECTIVE AND OBJECTIVE BOX
Patient is a 69y old  Female who presents with a chief complaint of Lethargy, SOB (06 Dec 2019 18:13)      INTERVAL HPI/OVERNIGHT EVENTS:    MEDICATIONS  (STANDING):  allopurinol 100 milliGRAM(s) Oral two times a day  digoxin     Tablet 0.125 milliGRAM(s) Oral daily  furosemide    Tablet 40 milliGRAM(s) Oral daily  heparin  Infusion.  Unit(s)/Hr (19 mL/Hr) IV Continuous <Continuous>  ipratropium    for Nebulization 500 MICROGram(s) Nebulizer every 6 hours  metoprolol succinate ER 25 milliGRAM(s) Oral daily  pantoprazole  Injectable 40 milliGRAM(s) IV Push two times a day  potassium chloride    Tablet ER 20 milliEquivalent(s) Oral daily  simvastatin 5 milliGRAM(s) Oral at bedtime    MEDICATIONS  (PRN):  heparin  Injectable 9000 Unit(s) IV Push every 6 hours PRN For aPTT less than 40  heparin  Injectable 4000 Unit(s) IV Push every 6 hours PRN For aPTT between 40 - 57  sodium chloride 0.65% Nasal 1 Spray(s) Both Nostrils four times a day PRN Nasal Congestion      Allergies    No Known Allergies    Intolerances        REVIEW OF SYSTEMS:  CONSTITUTIONAL: No fever, weight loss, or fatigue  EYES: No eye pain, visual disturbances, or discharge  ENMT:  No difficulty hearing, tinnitus, vertigo; No sinus or throat pain  NECK: No pain or stiffness  BREASTS: No pain, masses, or nipple discharge  RESPIRATORY: No cough, wheezing, chills or hemoptysis; No shortness of breath  CARDIOVASCULAR: No chest pain, palpitations, dizziness, or leg swelling  GASTROINTESTINAL: No abdominal or epigastric pain. No nausea, vomiting, or hematemesis; No diarrhea or constipation. No melena or hematochezia.  GENITOURINARY: No dysuria, frequency, hematuria, or incontinence  NEUROLOGICAL: No headaches, memory loss, loss of strength, numbness, or tremors  SKIN: No itching, burning, rashes, or lesions   LYMPH NODES: No enlarged glands  ENDOCRINE: No heat or cold intolerance; No hair loss  MUSCULOSKELETAL: No joint pain or swelling; No muscle, back, or extremity pain  PSYCHIATRIC: No depression, anxiety, mood swings, or difficulty sleeping  HEME/LYMPH: No easy bruising, or bleeding gums  ALLERGY AND IMMUNOLOGIC: No hives or eczema    Vital Signs Last 24 Hrs  T(C): 36.2 (07 Dec 2019 05:28), Max: 36.9 (06 Dec 2019 17:26)  T(F): 97.1 (07 Dec 2019 05:28), Max: 98.5 (06 Dec 2019 17:26)  HR: 74 (07 Dec 2019 06:16) (68 - 82)  BP: 119/70 (07 Dec 2019 05:28) (113/65 - 133/78)  BP(mean): --  RR: 18 (07 Dec 2019 05:28) (18 - 19)  SpO2: 97% (07 Dec 2019 06:16) (95% - 100%)    PHYSICAL EXAM:  GENERAL: NAD, well-groomed, well-developed. Obese.  HEAD:  Atraumatic, Normocephalic  EYES: EOMI, PERRL, conjunctiva and sclera clear  ENMT:  Moist mucous membranes, Good dentition, No lesions  NECK: Supple, No JVD, Normal thyroid  NERVOUS SYSTEM:  Alert & Oriented X3, Good concentration; Motor Strength 5/5 B/L upper and lower extremities; DTRs 2+ intact and symmetric  CHEST/LUNG: Clear to percussion bilaterally; No rales, rhonchi, wheezing, or rubs  HEART: Regular rate and rhythm; Systolic murmur, No rubs, or gallops  ABDOMEN: Soft, Nontender, Nondistended; Bowel sounds present  EXTREMITIES:  2+ Peripheral Pulses, No clubbing, cyanosis, or edema  LYMPH: No lymphadenopathy noted  SKIN: No rashes or lesions    LABS:                        7.9    9.78  )-----------( 360      ( 07 Dec 2019 05:50 )             27.4     12-05    139  |  102  |  52<H>  ----------------------------<  147<H>  4.7   |  33<H>  |  1.60<H>    Ca    9.0      05 Dec 2019 10:44      PT/INR - ( 07 Dec 2019 05:50 )   PT: 13.7 sec;   INR: 1.22 ratio         PTT - ( 07 Dec 2019 05:50 )  PTT:>200.0 sec    CAPILLARY BLOOD GLUCOSE      RADIOLOGY & ADDITIONAL TESTS:    Imaging Personally Reviewed:  [ ] YES  [ ] NO    Consultant(s) Notes Reviewed:  [ ] YES  [ ] NO    Care Discussed with Consultants/Other Providers [ ] YES  [ ] NO    PROBLEMS:  GI BLEED  SHORTNESS OF BREATH  GI bleed  Atrial fibrillation, unspecified type  Gastrointestinal hemorrhage with melena  Mitral valve replaced  Longstanding persistent atrial fibrillation  Pulmonary hypertension  MIGUEL (obstructive sleep apnea)  Chronic obstructive pulmonary disease, unspecified COPD type  Gout  Essential hypertension  Gastrointestinal hemorrhage associated with anorectal source      Care discussed with family,         [  ]   yes  [  ]  No    imp:    stable[ ]    unstable[  ]     improving [   ]       unchanged  [  ]                Plans:  Continue present plans  [  ]               New consult [  ]   specialty  .......               order maribell[  ]    test name.                  Discharge Planning  [  ]

## 2019-12-07 NOTE — PROGRESS NOTE ADULT - SUBJECTIVE AND OBJECTIVE BOX
Gastroenterology  Patient seen and examined bedside resting comfortably.  No complaints offered.   Tolerating diet   Formed black stool yesterday, none today. Currently on heparin drip     T(F): 97.1 (12-07-19 @ 05:28), Max: 98.5 (12-06-19 @ 17:26)  HR: 74 (12-07-19 @ 06:16) (68 - 82)  BP: 119/70 (12-07-19 @ 05:28) (113/65 - 133/78)  RR: 18 (12-07-19 @ 05:28) (18 - 19)  SpO2: 97% (12-07-19 @ 06:16) (95% - 100%)  Wt(kg): --  CAPILLARY BLOOD GLUCOSE          PHYSICAL EXAM:  General: NAD, obese   Neuro:  Alert & responsive  HEENT: NCAT, EOMI, + NC   CV: +S1+S2 regular rate and rhythm  Lung: clear to ausculation bilaterally, respirations nonlabored, good inspiratory effort  Abdomen: soft, Non Tender, No distention Normal active BS  Extremities: trace edema    LABS:                        7.9    9.78  )-----------( 360      ( 07 Dec 2019 05:50 )             27.4             PT/INR - ( 07 Dec 2019 05:50 )   PT: 13.7 sec;   INR: 1.22 ratio         PTT - ( 07 Dec 2019 05:50 )  PTT:>200.0 sec  I&O's Detail    06 Dec 2019 07:01  -  07 Dec 2019 07:00  --------------------------------------------------------  IN:    Oral Fluid: 600 mL  Total IN: 600 mL    OUT:  Total OUT: 0 mL    Total NET: 600 mL

## 2019-12-07 NOTE — PROGRESS NOTE ADULT - ASSESSMENT
69 AAM with PMHx MVR, TVR, afib on coumadin, HTN, CHF, COPD on home O2, pulmonary hypertension, morbid obesity who presents to the hospital with symptomatic anemia secondary to overt GI bleeding (melena), likely upper GI source.     VSS on presentation. Labs reveal Hg 5.3, BUN 84, Cr 1.54, INR 3.8.     Likely upper GI bleeding in the setting of melena, anemia, elevated BUN. Differential includes PUD, AVM, dieulafoy, esophagitis/gastritis.   **Given comorbidities she is very high risk for sedation; advise for cardiac clearance and anesthia evaluation prior to endoscopic evaluation    11/29: Hg 6.5 s/p 3 units pRBC, repeat Hg pending. Denies any active bleeding   11/30: HGB 8.0 yesterday, today's pending, No signs of active bleeding  12/1: HGB 7.3, drifting downwards. INR 2.4. No signs of active bleeding  12/2: Hg 9.4 s/p 3 units (total 7 units). No signs of active bleeding. Long discussion with cardiology Dr. Castillo that she is very high risk for sedation given valvulopathy, obesity, severe COPD On home O2, CHF. Would avoid sedation unless active hemorrhoids  12/3: Hg 8.5. 1 firm black bowel movement. INR 1.35  12/4: Hg 7.9. 1 formed black bowel movement.   12/5: Hg 8.2. No stools today.   12/6: 1 black formed stool. Plan for EGD today however OR cancelled given broken endoscope sterilizer.   12/7: Hg 7.7, on heparin drip. no stools today.     ***Given need to go back onto anticoagulation, would advise endoscopic evaluation prior to restarting coumadin. Discussed risk of procedure with patient in detail including bleeding, aspiration, infection, perforation, death.

## 2019-12-07 NOTE — CHART NOTE - NSCHARTNOTEFT_GEN_A_CORE
Assessment: Pt seen for nutrition follow up. Pt admitted with GI bleed. Pt with Afib, MV replacement, pulmonary HTN, COPD, Gout, h/o CHF, obesity. Pt denied any N/V/D/C, tolerating meals well. Pt reported good appetite. Encouraged pt to continue healthful heart heathy diet.     Factors impacting intake: [x ] none [ ] nausea  [ ] vomiting [ ] diarrhea [ ] constipation  [ ]chewing problems [ ] swallowing issues  [ ] other:     Diet Prescription: Diet, DASH/TLC:   Sodium & Cholesterol Restricted  Dysphagia 3, Soft, Thin Liquids ([id number]) (12-06-19 @ 10:36)    Intake:100%    Current Weight:  112.4 kg (12-04 most recent )   ; 112.7 kg (11-27)  % Weight Change: <1% change x 7 days     Physical appearance: BMI 41.2; BILAT ankle edema 1+    Pertinent Medications: MEDICATIONS  (STANDING):  allopurinol 100 milliGRAM(s) Oral two times a day  digoxin     Tablet 0.125 milliGRAM(s) Oral daily  furosemide    Tablet 40 milliGRAM(s) Oral daily  heparin  Infusion.  Unit(s)/Hr (19 mL/Hr) IV Continuous <Continuous>  ipratropium    for Nebulization 500 MICROGram(s) Nebulizer every 6 hours  metoprolol succinate ER 25 milliGRAM(s) Oral daily  pantoprazole  Injectable 40 milliGRAM(s) IV Push two times a day  potassium chloride    Tablet ER 20 milliEquivalent(s) Oral daily  simvastatin 5 milliGRAM(s) Oral at bedtime    MEDICATIONS  (PRN):  heparin  Injectable 9000 Unit(s) IV Push every 6 hours PRN For aPTT less than 40  heparin  Injectable 4000 Unit(s) IV Push every 6 hours PRN For aPTT between 40 - 57  sodium chloride 0.65% Nasal 1 Spray(s) Both Nostrils four times a day PRN Nasal Congestion    Pertinent Labs: 12-07 Na n/a   Glu n/a   K+ n/a   Cr n/a   BUN n/a   Phos n/a   Alb n/a   PAB n/a   Hgb 7.9 g/dL<L> Hct 27.4 %<L> HgA1C n/a     24Hr FS:  Skin: intact     Estimated Needs:   [x ] no change since previous assessment (11/30)  [ ] recalculated:     Previous Nutrition Diagnosis:   No active nutrition dx   Nutrition Diagnosis is [ ] ongoing  [ ] resolved  [ ] improved  [ x] not applicable       New Nutrition Diagnosis: [x ] not applicable       Interventions:   Recommend  [x ] Continue: Current diet Rx   [ ] Change Diet To:  [ ] Nutrition Supplement:  [ ] Nutrition Support:  [ ] Other:     Monitoring and Evaluation:   [ x] PO intake [ x ] Tolerance to diet prescription [ x ] weights [ x ] labs[ x ] follow up per protocol  [ ] other:

## 2019-12-08 LAB
APTT BLD: 90.1 SEC — HIGH (ref 28.5–37)
APTT BLD: 96.6 SEC — HIGH (ref 27.5–36.3)
BLD GP AB SCN SERPL QL: SIGNIFICANT CHANGE UP
HCT VFR BLD CALC: 26.2 % — LOW (ref 34.5–45)
HGB BLD-MCNC: 7.7 G/DL — LOW (ref 11.5–15.5)
MCHC RBC-ENTMCNC: 28.8 PG — SIGNIFICANT CHANGE UP (ref 27–34)
MCHC RBC-ENTMCNC: 29.4 GM/DL — LOW (ref 32–36)
MCV RBC AUTO: 98.1 FL — SIGNIFICANT CHANGE UP (ref 80–100)
NRBC # BLD: 0 /100 WBCS — SIGNIFICANT CHANGE UP (ref 0–0)
PLATELET # BLD AUTO: 341 K/UL — SIGNIFICANT CHANGE UP (ref 150–400)
RBC # BLD: 2.67 M/UL — LOW (ref 3.8–5.2)
RBC # FLD: 14.5 % — SIGNIFICANT CHANGE UP (ref 10.3–14.5)
WBC # BLD: 9.34 K/UL — SIGNIFICANT CHANGE UP (ref 3.8–10.5)
WBC # FLD AUTO: 9.34 K/UL — SIGNIFICANT CHANGE UP (ref 3.8–10.5)

## 2019-12-08 RX ADMIN — Medication 500 MICROGRAM(S): at 23:31

## 2019-12-08 RX ADMIN — Medication 100 MILLIGRAM(S): at 05:15

## 2019-12-08 RX ADMIN — Medication 500 MICROGRAM(S): at 00:15

## 2019-12-08 RX ADMIN — HEPARIN SODIUM 1200 UNIT(S)/HR: 5000 INJECTION INTRAVENOUS; SUBCUTANEOUS at 06:00

## 2019-12-08 RX ADMIN — Medication 20 MILLIEQUIVALENT(S): at 11:18

## 2019-12-08 RX ADMIN — Medication 500 MICROGRAM(S): at 23:12

## 2019-12-08 RX ADMIN — Medication 25 MILLIGRAM(S): at 05:15

## 2019-12-08 RX ADMIN — Medication 500 MICROGRAM(S): at 05:00

## 2019-12-08 RX ADMIN — Medication 500 MICROGRAM(S): at 11:01

## 2019-12-08 RX ADMIN — Medication 100 MILLIGRAM(S): at 17:31

## 2019-12-08 RX ADMIN — PANTOPRAZOLE SODIUM 40 MILLIGRAM(S): 20 TABLET, DELAYED RELEASE ORAL at 05:15

## 2019-12-08 RX ADMIN — HEPARIN SODIUM 1200 UNIT(S)/HR: 5000 INJECTION INTRAVENOUS; SUBCUTANEOUS at 12:34

## 2019-12-08 RX ADMIN — Medication 500 MICROGRAM(S): at 17:07

## 2019-12-08 RX ADMIN — Medication 0.12 MILLIGRAM(S): at 05:15

## 2019-12-08 RX ADMIN — PANTOPRAZOLE SODIUM 40 MILLIGRAM(S): 20 TABLET, DELAYED RELEASE ORAL at 17:31

## 2019-12-08 RX ADMIN — Medication 40 MILLIGRAM(S): at 05:15

## 2019-12-08 RX ADMIN — SIMVASTATIN 5 MILLIGRAM(S): 20 TABLET, FILM COATED ORAL at 21:03

## 2019-12-08 NOTE — PROGRESS NOTE ADULT - ASSESSMENT
IMPROVE VTE Individual Risk Assessment    RISK                                                                Points    [  ] Previous VTE                                                  3    [  ] Thrombophilia                                               2    [  ] Lower limb paralysis                                      2        (unable to hold up >15 seconds)      [  ] Current Cancer                                              2         (within 6 months)    [x  ] Immobilization > 24 hrs                                1    [  ] ICU/CCU stay > 24 hours                              1    [x  ] Age > 60                                                      1    IMPROVE VTE Score _2________    IMPROVE Score 0-1: Low Risk, No VTE prophylaxis required for most patients, encourage ambulation.   IMPROVE Score 2-3: At risk, pharmacologic VTE prophylaxis is indicated for most patients (in the absence of a contraindication)  IMPROVE Score > or = 4: High Risk, pharmacologic VTE prophylaxis is indicated for most patients (in the absence of a contraindication)    No Phar. A/C 2* GIB  11/28/19 : Asymptomatic at rest. Receiving blood transfusion. Obese. Clear lungs. RSR. GI consult.  11/29/19 : No overt bleeding. repeat Hb pending. Spoke to the cardiologist, will wait for INR to normalize before clearing for EGD if needed.  11/30/19 : Hb 8.0 No overt bleeding. INR therapeutic. Follow CBC. Pt. asymptomatic  12/01//19 : Mild SOB. Hb. trending down, transfuse 1 unit PRBC. INR 2.40. Continue monitoring Hb.  12/02/19 : Asymptomatic. S/P Transfusion. Monitor CBC. Hb 9.4.  12/03/19 : Still has dark stools. Hb. dropped to 8.5 . Spoke to Dr. Manning, may transfer to [facility].  12/04/19 : Pt. alert. Melanotic stool present. Hb dropped again. Cardiology consult noted, cleared for EGD. GI f/u.  12/05/19 : Alert, asymptomatic. Follow CBC. BUN improved. GI F/U.  12/06/19 : EGD post poned 2* Strerilization problems. Will need A/C for bridging. Discussed with Dr. Manning, want to start on IV Heparin.  monitor cbc.  12/07/19 : Awake, asymptomatic. On Fuul heparinization. Hb 7.9. Monitor CBC.   12/08/19 : On IV heparin, Hb 7.7, will transfuse . GI f/u.

## 2019-12-08 NOTE — PROGRESS NOTE ADULT - ASSESSMENT
69 AAM with PMHx MVR, TVR, afib on coumadin, HTN, CHF, COPD on home O2, pulmonary hypertension, morbid obesity who presents to the hospital with symptomatic anemia secondary to overt GI bleeding (melena), likely upper GI source.     VSS on presentation. Labs reveal Hg 5.3, BUN 84, Cr 1.54, INR 3.8.     Likely upper GI bleeding in the setting of melena, anemia, elevated BUN. Differential includes PUD, AVM, dieulafoy, esophagitis/gastritis.   **Given comorbidities she is very high risk for sedation; advise for cardiac clearance and anesthia evaluation prior to endoscopic evaluation    11/29: Hg 6.5 s/p 3 units pRBC, repeat Hg pending. Denies any active bleeding   11/30: HGB 8.0 yesterday, today's pending, No signs of active bleeding  12/1: HGB 7.3, drifting downwards. INR 2.4. No signs of active bleeding  12/2: Hg 9.4 s/p 3 units (total 7 units). No signs of active bleeding. Long discussion with cardiology Dr. Castillo that she is very high risk for sedation given valvulopathy, obesity, severe COPD On home O2, CHF. Would avoid sedation unless active hemorrhoids  12/3: Hg 8.5. 1 firm black bowel movement. INR 1.35  12/4: Hg 7.9. 1 formed black bowel movement.   12/5: Hg 8.2. No stools today.   12/6: 1 black formed stool. Plan for EGD today however OR cancelled given broken endoscope sterilizer.   12/7: Hg 7.7, on heparin drip. no stools today.   12/8: Hg 7.7, on heparin drip. 1 brown stool (improved)    ***Given need to go back onto anticoagulation, would advise endoscopic evaluation prior to restarting coumadin. Discussed risk of procedure with patient in detail including bleeding, aspiration, infection, perforation, death.

## 2019-12-08 NOTE — PROGRESS NOTE ADULT - SUBJECTIVE AND OBJECTIVE BOX
Gastroenterology  Patient seen and examined bedside resting comfortably.  No complaints offered.   No abdominal pain  Denies nausea and vomiting. Tolerating diet.  Brown bowel movement today, not black, no blood.     T(F): 97 (12-08-19 @ 12:01), Max: 99.1 (12-07-19 @ 17:57)  HR: 66 (12-08-19 @ 12:01) (64 - 80)  BP: 159/60 (12-08-19 @ 12:01) (113/69 - 159/60)  RR: 18 (12-08-19 @ 12:01) (18 - 18)  SpO2: 100% (12-08-19 @ 12:01) (94% - 100%)  Wt(kg): --  CAPILLARY BLOOD GLUCOSE        PHYSICAL EXAM:  General: NAD, obese   Neuro:  Alert & responsive  HEENT: NCAT, EOMI, conjunctiva clear, + NC   CV: +S1+S2 regular rate and rhythm  Lung: clear to ausculation bilaterally, respirations nonlabored, good inspiratory effort  Abdomen: soft, Non Tender, No distention Normal active BS  Extremities: no cyanosis, clubbing or edema    LABS:                        7.7    9.34  )-----------( 341      ( 08 Dec 2019 04:57 )             26.2             PT/INR - ( 07 Dec 2019 05:50 )   PT: 13.7 sec;   INR: 1.22 ratio         PTT - ( 08 Dec 2019 11:35 )  PTT:90.1 sec  I&O's Detail    07 Dec 2019 07:01  -  08 Dec 2019 07:00  --------------------------------------------------------  IN:    heparin  Infusion.: 154 mL    Oral Fluid: 320 mL  Total IN: 474 mL    OUT:  Total OUT: 0 mL    Total NET: 474 mL

## 2019-12-08 NOTE — PROGRESS NOTE ADULT - SUBJECTIVE AND OBJECTIVE BOX
INTERVAL HPI:   69 year female with HTN, A fib, Mitral & Tricuspid valve replacement, CHF, COPD(never smoked) on home O2, Obesity, MIGUEL(did not tolerate CPAP), pHTN, Gout  Brought by EMS c/o progressively sob,  dizziness for two weeks,  unable to walk about 1/2 block. Increases  sob to laid down and decreases to sit up. Denies cough, chest pain, nausea, vomiting, fever, chills, abd. Also c/o black stool in the past one to two weeks, stopped taking iron pills three weeks ago.  Never smoked.    OVERNIGHT EVENTS:  Getting PRBC.    Vital Signs Last 24 Hrs  T(C): 37.1 (08 Dec 2019 16:20), Max: 37.3 (07 Dec 2019 17:57)  T(F): 98.8 (08 Dec 2019 16:20), Max: 99.1 (07 Dec 2019 17:57)  HR: 57 (08 Dec 2019 16:20) (57 - 80)  BP: 129/57 (08 Dec 2019 16:20) (113/69 - 159/60)  BP(mean): --  RR: 18 (08 Dec 2019 16:20) (18 - 18)  SpO2: 100% (08 Dec 2019 16:20) (94% - 100%)    PHYSICAL EXAM:  GEN:         Awake, responsive and comfortable.  HEENT:    Normal.    RESP:       no wheezing.  CVS:          Regular rate and rhythm.   ABD:         Soft, non-tender, non-distended;     MEDICATIONS  (STANDING):  allopurinol 100 milliGRAM(s) Oral two times a day  digoxin     Tablet 0.125 milliGRAM(s) Oral daily  furosemide    Tablet 40 milliGRAM(s) Oral daily  heparin  Infusion.  Unit(s)/Hr (19 mL/Hr) IV Continuous <Continuous>  ipratropium    for Nebulization 500 MICROGram(s) Nebulizer every 6 hours  metoprolol succinate ER 25 milliGRAM(s) Oral daily  pantoprazole  Injectable 40 milliGRAM(s) IV Push two times a day  potassium chloride    Tablet ER 20 milliEquivalent(s) Oral daily  simvastatin 5 milliGRAM(s) Oral at bedtime    MEDICATIONS  (PRN):  heparin  Injectable 9000 Unit(s) IV Push every 6 hours PRN For aPTT less than 40  heparin  Injectable 4000 Unit(s) IV Push every 6 hours PRN For aPTT between 40 - 57  sodium chloride 0.65% Nasal 1 Spray(s) Both Nostrils four times a day PRN Nasal Congestion    LABS:                        7.7    9.34  )-----------( 341      ( 08 Dec 2019 04:57 )             26.2   PT/INR - ( 07 Dec 2019 05:50 )   PT: 13.7 sec;   INR: 1.22 ratio      PTT - ( 08 Dec 2019 11:35 )  PTT:90.1 sec     ASSESSMENT AND PLAN:  ·	SOB, multifactorial.  ·	Chronic hypoxic hypercarbic Respiratory failure.  ·	CHF by history.  ·	pHTN.  ·	Atrial Fibrillation.  ·	S/P Mitral and Tricuspid Valve replacement.  ·	Severe Anemia.  ·	Suspect GI bleed.  ·	Renal Insuffiencey.  ·	Obesity.  ·	MIGUEL, did not tolerate CPAP.    Getting PRBC.  Continue O2, nebulizer and diuretics.  For EGD.

## 2019-12-08 NOTE — PROGRESS NOTE ADULT - SUBJECTIVE AND OBJECTIVE BOX
Patient is a 69y old  Female who presents with a chief complaint of Lethargy, SOB (07 Dec 2019 15:32)      INTERVAL HPI/OVERNIGHT EVENTS:    MEDICATIONS  (STANDING):  allopurinol 100 milliGRAM(s) Oral two times a day  digoxin     Tablet 0.125 milliGRAM(s) Oral daily  furosemide    Tablet 40 milliGRAM(s) Oral daily  heparin  Infusion.  Unit(s)/Hr (19 mL/Hr) IV Continuous <Continuous>  ipratropium    for Nebulization 500 MICROGram(s) Nebulizer every 6 hours  metoprolol succinate ER 25 milliGRAM(s) Oral daily  pantoprazole  Injectable 40 milliGRAM(s) IV Push two times a day  potassium chloride    Tablet ER 20 milliEquivalent(s) Oral daily  simvastatin 5 milliGRAM(s) Oral at bedtime    MEDICATIONS  (PRN):  heparin  Injectable 9000 Unit(s) IV Push every 6 hours PRN For aPTT less than 40  heparin  Injectable 4000 Unit(s) IV Push every 6 hours PRN For aPTT between 40 - 57  sodium chloride 0.65% Nasal 1 Spray(s) Both Nostrils four times a day PRN Nasal Congestion      Allergies    No Known Allergies    Intolerances        REVIEW OF SYSTEMS:  CONSTITUTIONAL: No fever, weight loss, or fatigue  EYES: No eye pain, visual disturbances, or discharge  ENMT:  No difficulty hearing, tinnitus, vertigo; No sinus or throat pain  NECK: No pain or stiffness  BREASTS: No pain, masses, or nipple discharge  RESPIRATORY: No cough, wheezing, chills or hemoptysis; No shortness of breath  CARDIOVASCULAR: No chest pain, palpitations, dizziness, or leg swelling  GASTROINTESTINAL: No abdominal or epigastric pain. No nausea, vomiting, or hematemesis; No diarrhea or constipation. No melena or hematochezia.  GENITOURINARY: No dysuria, frequency, hematuria, or incontinence  NEUROLOGICAL: No headaches, memory loss, loss of strength, numbness, or tremors  SKIN: No itching, burning, rashes, or lesions   LYMPH NODES: No enlarged glands  ENDOCRINE: No heat or cold intolerance; No hair loss  MUSCULOSKELETAL: No joint pain or swelling; No muscle, back, or extremity pain  PSYCHIATRIC: No depression, anxiety, mood swings, or difficulty sleeping  HEME/LYMPH: No easy bruising, or bleeding gums  ALLERGY AND IMMUNOLOGIC: No hives or eczema    Vital Signs Last 24 Hrs  T(C): 37 (08 Dec 2019 05:12), Max: 37.3 (07 Dec 2019 17:57)  T(F): 98.6 (08 Dec 2019 05:12), Max: 99.1 (07 Dec 2019 17:57)  HR: 64 (08 Dec 2019 06:02) (64 - 80)  BP: 133/66 (08 Dec 2019 05:12) (113/69 - 135/71)  BP(mean): --  RR: 18 (08 Dec 2019 05:12) (18 - 20)  SpO2: 96% (08 Dec 2019 06:02) (94% - 100%)    PHYSICAL EXAM:  GENERAL: NAD, well-groomed, well-developed. Obese  HEAD:  Atraumatic, Normocephalic  EYES: EOMI, PERRL, conjunctiva and sclera clear  ENMT:  Moist mucous membranes, Good dentition, No lesions  NECK: Supple, No JVD, Normal thyroid  NERVOUS SYSTEM:  Alert & Oriented X3, Good concentration; Motor Strength 5/5 B/L upper and lower extremities;   CHEST/LUNG: Clear to percussion bilaterally; No rales, rhonchi, wheezing, or rubs  HEART: Regular rate and rhythm; No murmurs, rubs, or gallops  ABDOMEN: Soft, Nontender, Nondistended; Bowel sounds present  EXTREMITIES:  2+ Peripheral Pulses, No clubbing, cyanosis, or edema  LYMPH: No lymphadenopathy noted  SKIN: No rashes or lesions    LABS:                        7.7    9.34  )-----------( 341      ( 08 Dec 2019 04:57 )             26.2           PT/INR - ( 07 Dec 2019 05:50 )   PT: 13.7 sec;   INR: 1.22 ratio         PTT - ( 08 Dec 2019 04:57 )  PTT:96.6 sec    CAPILLARY BLOOD GLUCOSE                        RADIOLOGY & ADDITIONAL TESTS:    Imaging Personally Reviewed:  [ ] YES  [ ] NO    Consultant(s) Notes Reviewed:  [ ] YES  [ ] NO    Care Discussed with Consultants/Other Providers [ ] YES  [ ] NO    PROBLEMS:  GI BLEED  SHORTNESS OF BREATH  GI bleed  Atrial fibrillation, unspecified type  Gastrointestinal hemorrhage with melena  Mitral valve replaced  Longstanding persistent atrial fibrillation  Pulmonary hypertension  MIGUEL (obstructive sleep apnea)  Chronic obstructive pulmonary disease, unspecified COPD type  Gout  Essential hypertension  Gastrointestinal hemorrhage associated with anorectal source      Care discussed with family,         [  ]   yes  [  ]  No    imp:    stable[ ]    unstable[  ]     improving [   ]       unchanged  [  ]                Plans:  Continue present plans  [  ]               New consult [  ]   specialty  .......               order maribell[  ]    test name.                  Discharge Planning  [  ]

## 2019-12-09 LAB
APTT BLD: 35.9 SEC — SIGNIFICANT CHANGE UP (ref 27.5–36.3)
APTT BLD: 68 SEC — HIGH (ref 28.5–37)
HCT VFR BLD CALC: 30.2 % — LOW (ref 34.5–45)
HGB BLD-MCNC: 9.1 G/DL — LOW (ref 11.5–15.5)
MCHC RBC-ENTMCNC: 28.5 PG — SIGNIFICANT CHANGE UP (ref 27–34)
MCHC RBC-ENTMCNC: 30.1 GM/DL — LOW (ref 32–36)
MCV RBC AUTO: 94.7 FL — SIGNIFICANT CHANGE UP (ref 80–100)
NRBC # BLD: 0 /100 WBCS — SIGNIFICANT CHANGE UP (ref 0–0)
PLATELET # BLD AUTO: 324 K/UL — SIGNIFICANT CHANGE UP (ref 150–400)
RBC # BLD: 3.19 M/UL — LOW (ref 3.8–5.2)
RBC # FLD: 15.9 % — HIGH (ref 10.3–14.5)
WBC # BLD: 8.3 K/UL — SIGNIFICANT CHANGE UP (ref 3.8–10.5)
WBC # FLD AUTO: 8.3 K/UL — SIGNIFICANT CHANGE UP (ref 3.8–10.5)

## 2019-12-09 RX ORDER — HEPARIN SODIUM 5000 [USP'U]/ML
1200 INJECTION INTRAVENOUS; SUBCUTANEOUS
Qty: 25000 | Refills: 0 | Status: DISCONTINUED | OUTPATIENT
Start: 2019-12-09 | End: 2019-12-10

## 2019-12-09 RX ORDER — HEPARIN SODIUM 5000 [USP'U]/ML
9000 INJECTION INTRAVENOUS; SUBCUTANEOUS ONCE
Refills: 0 | Status: COMPLETED | OUTPATIENT
Start: 2019-12-09 | End: 2019-12-09

## 2019-12-09 RX ORDER — HEPARIN SODIUM 5000 [USP'U]/ML
9000 INJECTION INTRAVENOUS; SUBCUTANEOUS EVERY 6 HOURS
Refills: 0 | Status: DISCONTINUED | OUTPATIENT
Start: 2019-12-09 | End: 2019-12-18

## 2019-12-09 RX ORDER — HEPARIN SODIUM 5000 [USP'U]/ML
4000 INJECTION INTRAVENOUS; SUBCUTANEOUS EVERY 6 HOURS
Refills: 0 | Status: DISCONTINUED | OUTPATIENT
Start: 2019-12-09 | End: 2019-12-18

## 2019-12-09 RX ADMIN — Medication 100 MILLIGRAM(S): at 17:17

## 2019-12-09 RX ADMIN — SIMVASTATIN 5 MILLIGRAM(S): 20 TABLET, FILM COATED ORAL at 21:54

## 2019-12-09 RX ADMIN — Medication 0.12 MILLIGRAM(S): at 11:07

## 2019-12-09 RX ADMIN — Medication 500 MICROGRAM(S): at 11:26

## 2019-12-09 RX ADMIN — PANTOPRAZOLE SODIUM 40 MILLIGRAM(S): 20 TABLET, DELAYED RELEASE ORAL at 17:17

## 2019-12-09 RX ADMIN — Medication 25 MILLIGRAM(S): at 06:30

## 2019-12-09 RX ADMIN — HEPARIN SODIUM 9000 UNIT(S): 5000 INJECTION INTRAVENOUS; SUBCUTANEOUS at 21:38

## 2019-12-09 RX ADMIN — Medication 500 MICROGRAM(S): at 05:05

## 2019-12-09 RX ADMIN — Medication 100 MILLIGRAM(S): at 06:29

## 2019-12-09 RX ADMIN — HEPARIN SODIUM 1200 UNIT(S)/HR: 5000 INJECTION INTRAVENOUS; SUBCUTANEOUS at 06:46

## 2019-12-09 RX ADMIN — PANTOPRAZOLE SODIUM 40 MILLIGRAM(S): 20 TABLET, DELAYED RELEASE ORAL at 06:30

## 2019-12-09 RX ADMIN — HEPARIN SODIUM 1200 UNIT(S)/HR: 5000 INJECTION INTRAVENOUS; SUBCUTANEOUS at 21:43

## 2019-12-09 RX ADMIN — Medication 20 MILLIEQUIVALENT(S): at 11:43

## 2019-12-09 RX ADMIN — Medication 40 MILLIGRAM(S): at 06:30

## 2019-12-09 RX ADMIN — Medication 500 MICROGRAM(S): at 17:10

## 2019-12-09 NOTE — PRE-OP CHECKLIST - 1.
menstruation stopped 40 years ago menstruation stopped 40 years ago; Heparin drip held this am ( see EMAR)

## 2019-12-09 NOTE — PROGRESS NOTE ADULT - SUBJECTIVE AND OBJECTIVE BOX
Patient is a 69y old  Female who presents with a chief complaint of Lethargy, SOB (08 Dec 2019 16:59)      INTERVAL HPI/OVERNIGHT EVENTS:    MEDICATIONS  (STANDING):  allopurinol 100 milliGRAM(s) Oral two times a day  digoxin     Tablet 0.125 milliGRAM(s) Oral daily  furosemide    Tablet 40 milliGRAM(s) Oral daily  ipratropium    for Nebulization 500 MICROGram(s) Nebulizer every 6 hours  metoprolol succinate ER 25 milliGRAM(s) Oral daily  pantoprazole  Injectable 40 milliGRAM(s) IV Push two times a day  potassium chloride    Tablet ER 20 milliEquivalent(s) Oral daily  simvastatin 5 milliGRAM(s) Oral at bedtime    MEDICATIONS  (PRN):  heparin  Injectable 9000 Unit(s) IV Push every 6 hours PRN For aPTT less than 40  heparin  Injectable 4000 Unit(s) IV Push every 6 hours PRN For aPTT between 40 - 57  sodium chloride 0.65% Nasal 1 Spray(s) Both Nostrils four times a day PRN Nasal Congestion      Allergies    No Known Allergies    Intolerances        REVIEW OF SYSTEMS:  CONSTITUTIONAL: No fever, weight loss, or fatigue  EYES: No eye pain, visual disturbances, or discharge  ENMT:  No difficulty hearing, tinnitus, vertigo; No sinus or throat pain  NECK: No pain or stiffness  BREASTS: No pain, masses, or nipple discharge  RESPIRATORY: No cough, wheezing, chills or hemoptysis; No shortness of breath  CARDIOVASCULAR: No chest pain, palpitations, dizziness, or leg swelling  GASTROINTESTINAL: No abdominal or epigastric pain. No nausea, vomiting, or hematemesis; No diarrhea or constipation. No melena or hematochezia.  GENITOURINARY: No dysuria, frequency, hematuria, or incontinence  NEUROLOGICAL: No headaches, memory loss, loss of strength, numbness, or tremors  SKIN: No itching, burning, rashes, or lesions   LYMPH NODES: No enlarged glands  ENDOCRINE: No heat or cold intolerance; No hair loss  MUSCULOSKELETAL: No joint pain or swelling; No muscle, back, or extremity pain  PSYCHIATRIC: No depression, anxiety, mood swings, or difficulty sleeping  HEME/LYMPH: No easy bruising, or bleeding gums  ALLERGY AND IMMUNOLOGIC: No hives or eczema    Vital Signs Last 24 Hrs  T(C): 37.1 (09 Dec 2019 11:00), Max: 37.1 (08 Dec 2019 16:20)  T(F): 98.8 (09 Dec 2019 11:00), Max: 98.8 (08 Dec 2019 16:20)  HR: 64 (09 Dec 2019 11:07) (57 - 74)  BP: 128/68 (09 Dec 2019 11:00) (116/60 - 159/60)  BP(mean): --  RR: 17 (09 Dec 2019 11:00) (17 - 19)  SpO2: 98% (09 Dec 2019 11:00) (94% - 100%)    PHYSICAL EXAM:  GENERAL: NAD, well-groomed, well-developed  HEAD:  Atraumatic, Normocephalic  EYES: EOMI, PERRL, conjunctiva and sclera clear  ENMT:  Moist mucous membranes, Good dentition, No lesions  NECK: Supple, No JVD, Normal thyroid  NERVOUS SYSTEM:  Alert & Oriented X3, Good concentration; Motor Strength 5/5 B/L upper and lower extremities; DTRs 2+ intact and symmetric  CHEST/LUNG: Clear to percussion bilaterally; No rales, rhonchi, wheezing, or rubs  HEART: Regular rate and rhythm; No murmurs, rubs, or gallops  ABDOMEN: Soft, Nontender, Nondistended; Bowel sounds present  EXTREMITIES:  2+ Peripheral Pulses, No clubbing, cyanosis, or edema  LYMPH: No lymphadenopathy noted  SKIN: No rashes or lesions    LABS:                        9.1    8.30  )-----------( 324      ( 09 Dec 2019 06:26 )             30.2           PTT - ( 09 Dec 2019 06:26 )  PTT:68.0 sec    CAPILLARY BLOOD GLUCOSE                        RADIOLOGY & ADDITIONAL TESTS:    Imaging Personally Reviewed:  [ ] YES  [ ] NO    Consultant(s) Notes Reviewed:  [ ] YES  [ ] NO    Care Discussed with Consultants/Other Providers [ ] YES  [ ] NO    PROBLEMS:  GI BLEED  SHORTNESS OF BREATH  GI bleed  Atrial fibrillation, unspecified type  Gastrointestinal hemorrhage with melena  Mitral valve replaced  Longstanding persistent atrial fibrillation  Pulmonary hypertension  MIGUEL (obstructive sleep apnea)  Chronic obstructive pulmonary disease, unspecified COPD type  Gout  Essential hypertension  Gastrointestinal hemorrhage associated with anorectal source      Care discussed with family,         [  ]   yes  [  ]  No    imp:    stable[ ]    unstable[  ]     improving [   ]       unchanged  [  ]                Plans:  Continue present plans  [  ]               New consult [  ]   specialty  .......               order maribell[  ]    test name.                  Discharge Planning  [  ]

## 2019-12-09 NOTE — PROGRESS NOTE ADULT - SUBJECTIVE AND OBJECTIVE BOX
INTERVAL HPI:  69 year female with HTN, A fib, Mitral & Tricuspid valve replacement, CHF, COPD(never smoked) on home O2, Obesity, MIGUEL(did not tolerate CPAP), pHTN, Gout  Brought by EMS c/o progressively sob,  dizziness for two weeks,  unable to walk about 1/2 block. Increases  sob to laid down and decreases to sit up. Denies cough, chest pain, nausea, vomiting, fever, chills, abd. Also c/o black stool in the past one to two weeks, stopped taking iron pills three weeks ago.  Never smoked.    OVERNIGHT EVENTS:  SOB at times.    Vital Signs Last 24 Hrs  T(C): 37.1 (09 Dec 2019 11:00), Max: 37.1 (09 Dec 2019 11:00)  T(F): 98.8 (09 Dec 2019 11:00), Max: 98.8 (09 Dec 2019 11:00)  HR: 68 (09 Dec 2019 11:27) (57 - 74)  BP: 128/68 (09 Dec 2019 11:00) (116/60 - 156/70)  BP(mean): --  RR: 17 (09 Dec 2019 11:00) (17 - 19)  SpO2: 98% (09 Dec 2019 11:27) (94% - 100%)    PHYSICAL EXAM:  GEN:         Awake, responsive and comfortable.  HEENT:    Normal.    RESP:       no wheezing.  CVS:             Regular rate and rhythm.   ABD:         Soft, non-tender, non-distended;     MEDICATIONS  (STANDING):  allopurinol 100 milliGRAM(s) Oral two times a day  digoxin     Tablet 0.125 milliGRAM(s) Oral daily  furosemide    Tablet 40 milliGRAM(s) Oral daily  ipratropium    for Nebulization 500 MICROGram(s) Nebulizer every 6 hours  metoprolol succinate ER 25 milliGRAM(s) Oral daily  pantoprazole  Injectable 40 milliGRAM(s) IV Push two times a day  potassium chloride    Tablet ER 20 milliEquivalent(s) Oral daily  simvastatin 5 milliGRAM(s) Oral at bedtime    MEDICATIONS  (PRN):  heparin  Injectable 9000 Unit(s) IV Push every 6 hours PRN For aPTT less than 40  heparin  Injectable 4000 Unit(s) IV Push every 6 hours PRN For aPTT between 40 - 57  sodium chloride 0.65% Nasal 1 Spray(s) Both Nostrils four times a day PRN Nasal Congestion    LABS:                        9.1    8.30  )-----------( 324      ( 09 Dec 2019 06:26 )             30.2   PTT - ( 09 Dec 2019 06:26 )  PTT:68.0 sec     ASSESSMENT AND PLAN:  ·	SOB, multifactorial.  ·	Chronic hypoxic hypercarbic Respiratory failure.  ·	CHF by history.  ·	pHTN.  ·	Atrial Fibrillation.  ·	S/P Mitral and Tricuspid Valve replacement.  ·	Severe Anemia.  ·	Suspect GI bleed.  ·	Renal Insuffiencey.  ·	Obesity.  ·	MIGUEL, did not tolerate CPAP.    Continue O2, nebulizer and diuretics.  Awaiting EGD.

## 2019-12-09 NOTE — PROGRESS NOTE ADULT - ASSESSMENT
IMPROVE VTE Individual Risk Assessment    RISK                                                                Points    [  ] Previous VTE                                                  3    [  ] Thrombophilia                                               2    [  ] Lower limb paralysis                                      2        (unable to hold up >15 seconds)      [  ] Current Cancer                                              2         (within 6 months)    [x  ] Immobilization > 24 hrs                                1    [  ] ICU/CCU stay > 24 hours                              1    [x  ] Age > 60                                                      1    IMPROVE VTE Score _2________    IMPROVE Score 0-1: Low Risk, No VTE prophylaxis required for most patients, encourage ambulation.   IMPROVE Score 2-3: At risk, pharmacologic VTE prophylaxis is indicated for most patients (in the absence of a contraindication)  IMPROVE Score > or = 4: High Risk, pharmacologic VTE prophylaxis is indicated for most patients (in the absence of a contraindication)    No Phar. A/C 2* GIB  11/28/19 : Asymptomatic at rest. Receiving blood transfusion. Obese. Clear lungs. RSR. GI consult.  11/29/19 : No overt bleeding. repeat Hb pending. Spoke to the cardiologist, will wait for INR to normalize before clearing for EGD if needed.  11/30/19 : Hb 8.0 No overt bleeding. INR therapeutic. Follow CBC. Pt. asymptomatic  12/01//19 : Mild SOB. Hb. trending down, transfuse 1 unit PRBC. INR 2.40. Continue monitoring Hb.  12/02/19 : Asymptomatic. S/P Transfusion. Monitor CBC. Hb 9.4.  12/03/19 : Still has dark stools. Hb. dropped to 8.5 . Spoke to Dr. Manning, may transfer to [facility].  12/04/19 : Pt. alert. Melanotic stool present. Hb dropped again. Cardiology consult noted, cleared for EGD. GI f/u.  12/05/19 : Alert, asymptomatic. Follow CBC. BUN improved. GI F/U.  12/06/19 : EGD post poned 2* Strerilization problems. Will need A/C for bridging. Discussed with Dr. Manning, want to start on IV Heparin.  monitor cbc.  12/07/19 : Awake, asymptomatic. On Fuul heparinization. Hb 7.9. Monitor CBC.   12/08/19 : On IV heparin, Hb 7.7, will transfuse . GI f/u.  12/09/19 : S/P Transfusion. FOR EGD today. Heparin held.

## 2019-12-09 NOTE — PROGRESS NOTE ADULT - ASSESSMENT
69 AAF with PMHx MVR, TVR, afib on coumadin, HTN, CHF, COPD on home O2, pulmonary hypertension, morbid obesity who presents to the hospital with symptomatic anemia secondary to overt GI bleeding (melena), likely upper GI source.     VSS on presentation. Labs reveal Hg 5.3, BUN 84, Cr 1.54, INR 3.8.     Likely upper GI bleeding in the setting of melena, anemia, elevated BUN. Differential includes PUD, AVM, dieulafoy, esophagitis/gastritis.   **Given comorbidities she is very high risk for sedation; advise for cardiac clearance and anesthia evaluation prior to endoscopic evaluation    11/29: Hg 6.5 s/p 3 units pRBC, repeat Hg pending. Denies any active bleeding   11/30: HGB 8.0 yesterday, today's pending, No signs of active bleeding  12/1: HGB 7.3, drifting downwards. INR 2.4. No signs of active bleeding  12/2: Hg 9.4 s/p 3 units (total 7 units). No signs of active bleeding. Long discussion with cardiology Dr. Castillo that she is very high risk for sedation given valvulopathy, obesity, severe COPD On home O2, CHF. Would avoid sedation unless active hemorrhoids  12/3: Hg 8.5. 1 firm black bowel movement. INR 1.35  12/4: Hg 7.9. 1 formed black bowel movement.   12/5: Hg 8.2. No stools today.   12/6: 1 black formed stool. Plan for EGD today however OR cancelled given broken endoscope sterilizer.   12/7: Hg 7.7, on heparin drip. no stools today.   12/8: Hg 7.7, on heparin drip. 1 brown stool (improved)    ***Given need to go back onto anticoagulation, would advise endoscopic evaluation prior to restarting coumadin. Discussed risk of procedure with patient in detail including bleeding, aspiration, infection, perforation, death.

## 2019-12-09 NOTE — PROGRESS NOTE ADULT - SUBJECTIVE AND OBJECTIVE BOX
Gastroenterology  Patient seen and examined bedside resting comfortably.  No complaints offered.   No abdominal pain  Denies nausea and vomiting. NPO  No active bleeding     T(F): 98.8 (12-09-19 @ 11:00), Max: 98.8 (12-08-19 @ 16:20)  HR: 64 (12-09-19 @ 11:07) (57 - 74)  BP: 128/68 (12-09-19 @ 11:00) (116/60 - 159/60)  RR: 17 (12-09-19 @ 11:00) (17 - 19)  SpO2: 98% (12-09-19 @ 11:00) (94% - 100%)  Wt(kg): --  CAPILLARY BLOOD GLUCOSE          PHYSICAL EXAM:  General: NAD, WDWN.   Neuro:  Alert & responsive  HEENT: NCAT, EOMI, conjunctiva clear  CV: +S1+S2 regular rate and rhythm  Lung: clear to ausculation bilaterally, respirations nonlabored, good inspiratory effort  Abdomen: soft, Non Tender, No distention Normal active BS  Extremities: no cyanosis, clubbing or edema    LABS:                        9.1    8.30  )-----------( 324      ( 09 Dec 2019 06:26 )             30.2             PTT - ( 09 Dec 2019 06:26 )  PTT:68.0 sec  I&O's Detail    08 Dec 2019 07:01  -  09 Dec 2019 07:00  --------------------------------------------------------  IN:    heparin  Infusion.: 144 mL    Oral Fluid: 260 mL    Packed Red Blood Cells: 355 mL  Total IN: 759 mL    OUT:  Total OUT: 0 mL    Total NET: 759 mL

## 2019-12-10 LAB
APTT BLD: 36.9 SEC — SIGNIFICANT CHANGE UP (ref 28.5–37)
APTT BLD: 97 SEC — HIGH (ref 27.5–36.3)
HCT VFR BLD CALC: 30.8 % — LOW (ref 34.5–45)
HGB BLD-MCNC: 9.3 G/DL — LOW (ref 11.5–15.5)
INR BLD: 1.13 RATIO — SIGNIFICANT CHANGE UP (ref 0.88–1.16)
MCHC RBC-ENTMCNC: 28.6 PG — SIGNIFICANT CHANGE UP (ref 27–34)
MCHC RBC-ENTMCNC: 30.2 GM/DL — LOW (ref 32–36)
MCV RBC AUTO: 94.8 FL — SIGNIFICANT CHANGE UP (ref 80–100)
NRBC # BLD: 0 /100 WBCS — SIGNIFICANT CHANGE UP (ref 0–0)
PLATELET # BLD AUTO: 328 K/UL — SIGNIFICANT CHANGE UP (ref 150–400)
PROTHROM AB SERPL-ACNC: 12.7 SEC — SIGNIFICANT CHANGE UP (ref 10–12.9)
RBC # BLD: 3.25 M/UL — LOW (ref 3.8–5.2)
RBC # FLD: 15.3 % — HIGH (ref 10.3–14.5)
WBC # BLD: 9.18 K/UL — SIGNIFICANT CHANGE UP (ref 3.8–10.5)
WBC # FLD AUTO: 9.18 K/UL — SIGNIFICANT CHANGE UP (ref 3.8–10.5)

## 2019-12-10 RX ORDER — HEPARIN SODIUM 5000 [USP'U]/ML
1200 INJECTION INTRAVENOUS; SUBCUTANEOUS
Qty: 25000 | Refills: 0 | Status: DISCONTINUED | OUTPATIENT
Start: 2019-12-10 | End: 2019-12-18

## 2019-12-10 RX ADMIN — Medication 100 MILLIGRAM(S): at 06:08

## 2019-12-10 RX ADMIN — Medication 500 MICROGRAM(S): at 11:47

## 2019-12-10 RX ADMIN — HEPARIN SODIUM 1200 UNIT(S)/HR: 5000 INJECTION INTRAVENOUS; SUBCUTANEOUS at 06:07

## 2019-12-10 RX ADMIN — SIMVASTATIN 5 MILLIGRAM(S): 20 TABLET, FILM COATED ORAL at 22:24

## 2019-12-10 RX ADMIN — Medication 500 MICROGRAM(S): at 00:34

## 2019-12-10 RX ADMIN — Medication 500 MICROGRAM(S): at 17:06

## 2019-12-10 RX ADMIN — PANTOPRAZOLE SODIUM 40 MILLIGRAM(S): 20 TABLET, DELAYED RELEASE ORAL at 17:27

## 2019-12-10 RX ADMIN — PANTOPRAZOLE SODIUM 40 MILLIGRAM(S): 20 TABLET, DELAYED RELEASE ORAL at 05:39

## 2019-12-10 RX ADMIN — HEPARIN SODIUM 1200 UNIT(S)/HR: 5000 INJECTION INTRAVENOUS; SUBCUTANEOUS at 20:07

## 2019-12-10 RX ADMIN — Medication 500 MICROGRAM(S): at 05:39

## 2019-12-10 RX ADMIN — Medication 100 MILLIGRAM(S): at 18:01

## 2019-12-10 RX ADMIN — Medication 0.12 MILLIGRAM(S): at 05:39

## 2019-12-10 RX ADMIN — Medication 40 MILLIGRAM(S): at 05:39

## 2019-12-10 RX ADMIN — Medication 25 MILLIGRAM(S): at 05:39

## 2019-12-10 NOTE — PROGRESS NOTE ADULT - SUBJECTIVE AND OBJECTIVE BOX
Patient is a 69y old  Female who presents with a chief complaint of Lethargy, SOB (09 Dec 2019 16:55)      INTERVAL HPI/OVERNIGHT EVENTS:    MEDICATIONS  (STANDING):  allopurinol 100 milliGRAM(s) Oral two times a day  digoxin     Tablet 0.125 milliGRAM(s) Oral daily  furosemide    Tablet 40 milliGRAM(s) Oral daily  ipratropium    for Nebulization 500 MICROGram(s) Nebulizer every 6 hours  metoprolol succinate ER 25 milliGRAM(s) Oral daily  pantoprazole  Injectable 40 milliGRAM(s) IV Push two times a day  potassium chloride    Tablet ER 20 milliEquivalent(s) Oral daily  simvastatin 5 milliGRAM(s) Oral at bedtime    MEDICATIONS  (PRN):  heparin  Injectable 9000 Unit(s) IV Push every 6 hours PRN For aPTT less than 40  heparin  Injectable 4000 Unit(s) IV Push every 6 hours PRN For aPTT between 40 - 57  sodium chloride 0.65% Nasal 1 Spray(s) Both Nostrils four times a day PRN Nasal Congestion      Allergies    No Known Allergies    Intolerances        REVIEW OF SYSTEMS:  CONSTITUTIONAL: No fever, weight loss, or fatigue  EYES: No eye pain, visual disturbances, or discharge  ENMT:  No difficulty hearing, tinnitus, vertigo; No sinus or throat pain  NECK: No pain or stiffness  BREASTS: No pain, masses, or nipple discharge  RESPIRATORY: No cough, wheezing, chills or hemoptysis; No shortness of breath  CARDIOVASCULAR: No chest pain, palpitations, dizziness, or leg swelling  GASTROINTESTINAL: No abdominal or epigastric pain. No nausea, vomiting, or hematemesis; No diarrhea or constipation. No melena or hematochezia.  GENITOURINARY: No dysuria, frequency, hematuria, or incontinence  NEUROLOGICAL: No headaches, memory loss, loss of strength, numbness, or tremors  SKIN: No itching, burning, rashes, or lesions   LYMPH NODES: No enlarged glands  ENDOCRINE: No heat or cold intolerance; No hair loss  MUSCULOSKELETAL: No joint pain or swelling; No muscle, back, or extremity pain  PSYCHIATRIC: No depression, anxiety, mood swings, or difficulty sleeping  HEME/LYMPH: No easy bruising, or bleeding gums  ALLERGY AND IMMUNOLOGIC: No hives or eczema    Vital Signs Last 24 Hrs  T(C): 36.2 (10 Dec 2019 06:12), Max: 37.2 (09 Dec 2019 17:13)  T(F): 97.1 (10 Dec 2019 06:12), Max: 99 (09 Dec 2019 17:13)  HR: 65 (10 Dec 2019 06:12) (62 - 73)  BP: 124/62 (10 Dec 2019 06:12) (124/62 - 149/75)  BP(mean): --  RR: 18 (10 Dec 2019 06:12) (17 - 18)  SpO2: 98% (10 Dec 2019 06:12) (96% - 100%)    PHYSICAL EXAM:  GENERAL: NAD, well-groomed, well-developed  HEAD:  Atraumatic, Normocephalic  EYES: EOMI, PERRL, conjunctiva and sclera clear  ENMT:  Moist mucous membranes, Good dentition, No lesions  NECK: Supple, No JVD, Normal thyroid  NERVOUS SYSTEM:  Alert & Oriented X3, Good concentration; Motor Strength 5/5 B/L upper and lower extremities; DTRs 2+ intact and symmetric  CHEST/LUNG: Clear to percussion bilaterally; No rales, rhonchi, wheezing, or rubs  HEART: Regular rate and rhythm; No murmurs, rubs, or gallops  ABDOMEN: Soft, Nontender, Nondistended; Bowel sounds present  EXTREMITIES:  2+ Peripheral Pulses, No clubbing, cyanosis, or edema  LYMPH: No lymphadenopathy noted  SKIN: No rashes or lesions    LABS:                        9.3    9.18  )-----------( 328      ( 10 Dec 2019 05:34 )             30.8           PTT - ( 10 Dec 2019 05:34 )  PTT:97.0 sec    CAPILLARY BLOOD GLUCOSE        RADIOLOGY & ADDITIONAL TESTS:    Imaging Personally Reviewed:  [ ] YES  [ ] NO    Consultant(s) Notes Reviewed:  [ ] YES  [ ] NO    Care Discussed with Consultants/Other Providers [ ] YES  [ ] NO    PROBLEMS:  GI BLEED  SHORTNESS OF BREATH  GI bleed  Atrial fibrillation, unspecified type  Gastrointestinal hemorrhage with melena  Mitral valve replaced  Longstanding persistent atrial fibrillation  Pulmonary hypertension  MIGUEL (obstructive sleep apnea)  Chronic obstructive pulmonary disease, unspecified COPD type  Gout  Essential hypertension  Gastrointestinal hemorrhage associated with anorectal source      Care discussed with family,         [  ]   yes  [  ]  No    imp:    stable[ ]    unstable[  ]     improving [   ]       unchanged  [  ]                Plans:  Continue present plans  [  ]               New consult [  ]   specialty  .......               order maribell[  ]    test name.                  Discharge Planning  [  ]

## 2019-12-10 NOTE — PROGRESS NOTE ADULT - ASSESSMENT
IMPROVE VTE Individual Risk Assessment    RISK                                                                Points    [  ] Previous VTE                                                  3    [  ] Thrombophilia                                               2    [  ] Lower limb paralysis                                      2        (unable to hold up >15 seconds)      [  ] Current Cancer                                              2         (within 6 months)    [x  ] Immobilization > 24 hrs                                1    [  ] ICU/CCU stay > 24 hours                              1    [x  ] Age > 60                                                      1    IMPROVE VTE Score _2________    IMPROVE Score 0-1: Low Risk, No VTE prophylaxis required for most patients, encourage ambulation.   IMPROVE Score 2-3: At risk, pharmacologic VTE prophylaxis is indicated for most patients (in the absence of a contraindication)  IMPROVE Score > or = 4: High Risk, pharmacologic VTE prophylaxis is indicated for most patients (in the absence of a contraindication)    No Phar. A/C 2* GIB  11/28/19 : Asymptomatic at rest. Receiving blood transfusion. Obese. Clear lungs. RSR. GI consult.  11/29/19 : No overt bleeding. repeat Hb pending. Spoke to the cardiologist, will wait for INR to normalize before clearing for EGD if needed.  11/30/19 : Hb 8.0 No overt bleeding. INR therapeutic. Follow CBC. Pt. asymptomatic  12/01//19 : Mild SOB. Hb. trending down, transfuse 1 unit PRBC. INR 2.40. Continue monitoring Hb.  12/02/19 : Asymptomatic. S/P Transfusion. Monitor CBC. Hb 9.4.  12/03/19 : Still has dark stools. Hb. dropped to 8.5 . Spoke to Dr. Manning, may transfer to [facility].  12/04/19 : Pt. alert. Melanotic stool present. Hb dropped again. Cardiology consult noted, cleared for EGD. GI f/u.  12/05/19 : Alert, asymptomatic. Follow CBC. BUN improved. GI F/U.  12/06/19 : EGD post poned 2* Strerilization problems. Will need A/C for bridging. Discussed with Dr. Manning, want to start on IV Heparin.  monitor cbc.  12/07/19 : Awake, asymptomatic. On Fuul heparinization. Hb 7.9. Monitor CBC.   12/08/19 : On IV heparin, Hb 7.7, will transfuse . GI f/u.  12/09/19 : S/P Transfusion. FOR EGD today. Heparin held.  12/10/19 : EGD could not be done yesterday. Scheduled for today. Heparin held. Hb stable.

## 2019-12-10 NOTE — PROGRESS NOTE ADULT - ASSESSMENT
69 AAF with PMHx MVR, TVR, afib on coumadin, HTN, CHF, COPD on home O2, pulmonary hypertension, morbid obesity who presents to the hospital with symptomatic anemia secondary to overt GI bleeding (melena), likely upper GI source.     VSS on presentation. Labs reveal Hg 5.3, BUN 84, Cr 1.54, INR 3.8.     Likely upper GI bleeding in the setting of melena, anemia, elevated BUN. Differential includes PUD, AVM, dieulafoy, esophagitis/gastritis.   **Given comorbidities she is very high risk for sedation; advise for cardiac clearance and anesthia evaluation prior to endoscopic evaluation    11/29: Hg 6.5 s/p 3 units pRBC, repeat Hg pending. Denies any active bleeding   11/30: HGB 8.0 yesterday, today's pending, No signs of active bleeding  12/1: HGB 7.3, drifting downwards. INR 2.4. No signs of active bleeding  12/2: Hg 9.4 s/p 3 units (total 7 units). No signs of active bleeding. Long discussion with cardiology Dr. Castillo that she is very high risk for sedation given valvulopathy, obesity, severe COPD On home O2, CHF. Would avoid sedation unless active hemorrhoids  12/3: Hg 8.5. 1 firm black bowel movement. INR 1.35  12/4: Hg 7.9. 1 formed black bowel movement.   12/5: Hg 8.2. No stools today.   12/6: 1 black formed stool. Plan for EGD today however OR cancelled given broken endoscope sterilizer.   12/7: Hg 7.7, on heparin drip. no stools today.   12/8: Hg 7.7, on heparin drip. 1 brown stool (improved)  12/10: Hg 9.4, s/p 2 units (9 units total). Brown stool. Hg stable x 48 hours     ***After long discussion with anesthesia regarding risks of procedure and with patient, given stable hg on heparin drip with brown stool, decision made to monitor for bleeding and hold off on endoscopy.

## 2019-12-10 NOTE — PROGRESS NOTE ADULT - SUBJECTIVE AND OBJECTIVE BOX
Gastroenterology  Patient seen and examined bedside resting comfortably.  Reports brown stool. Denies any other complaints. Improved fatigue.     T(F): 98 (12-10-19 @ 14:50), Max: 99 (12-09-19 @ 17:13)  HR: 61 (12-10-19 @ 14:50) (61 - 77)  BP: 133/55 (12-10-19 @ 14:50) (124/62 - 149/75)  RR: 17 (12-10-19 @ 14:50) (17 - 18)  SpO2: 98% (12-10-19 @ 14:50) (96% - 100%)  Wt(kg): --  CAPILLARY BLOOD GLUCOSE      PHYSICAL EXAM:  General: NAD, morbidly obese   Neuro:  Alert & responsive  HEENT: NCAT, EOMI, +NC  CV: +S1+S2 regular rate and rhythm  Lung: clear to ausculation bilaterally, respirations nonlabored, good inspiratory effort  Abdomen: soft, Non Tender, No distention Normal active BS  Extremities: no cyanosis, clubbing or edema    LABS:                        9.3    9.18  )-----------( 328      ( 10 Dec 2019 05:34 )             30.8             PTT - ( 10 Dec 2019 05:34 )  PTT:97.0 sec  I&O's Detail    09 Dec 2019 07:01  -  10 Dec 2019 07:00  --------------------------------------------------------  IN:  Total IN: 0 mL    OUT:    Voided: 350 mL  Total OUT: 350 mL    Total NET: -350 mL

## 2019-12-10 NOTE — CHART NOTE - NSCHARTNOTEFT_GEN_A_CORE
Medicine Hospitalist PA    Pt going for EGD requested by RN to stop heparin gtt by Dr Barreto. Medicine Hospitalist PA    Pt going for EGD requested by RN to stop heparin gtt by GI

## 2019-12-10 NOTE — PROGRESS NOTE ADULT - SUBJECTIVE AND OBJECTIVE BOX
INTERVAL HPI:  69 year female with HTN, A fib, Mitral & Tricuspid valve replacement, CHF, COPD(never smoked) on home O2, Obesity, MIGUEL(did not tolerate CPAP), pHTN, Gout  Brought by EMS c/o progressively sob,  dizziness for two weeks,  unable to walk about 1/2 block. Increases  sob to laid down and decreases to sit up. Denies cough, chest pain, nausea, vomiting, fever, chills, abd. Also c/o black stool in the past one to two weeks, stopped taking iron pills three weeks ago.  Never smoked.    OVERNIGHT EVENTS:  Comfortable    Vital Signs Last 24 Hrs  T(C): 36.7 (10 Dec 2019 14:50), Max: 37.2 (09 Dec 2019 17:13)  T(F): 98 (10 Dec 2019 14:50), Max: 99 (09 Dec 2019 17:13)  HR: 61 (10 Dec 2019 14:50) (61 - 77)  BP: 133/55 (10 Dec 2019 14:50) (124/62 - 149/75)  BP(mean): --  RR: 17 (10 Dec 2019 14:50) (17 - 18)  SpO2: 98% (10 Dec 2019 14:50) (96% - 100%)    PHYSICAL EXAM:  GEN:         Awake, responsive and comfortable.  HEENT:    Normal.    RESP:         no wheezing.  CVS:             Regular rate and rhythm.   ABD:         Soft, non-tender, non-distended;     MEDICATIONS  (STANDING):  allopurinol 100 milliGRAM(s) Oral two times a day  digoxin     Tablet 0.125 milliGRAM(s) Oral daily  furosemide    Tablet 40 milliGRAM(s) Oral daily  ipratropium    for Nebulization 500 MICROGram(s) Nebulizer every 6 hours  metoprolol succinate ER 25 milliGRAM(s) Oral daily  pantoprazole  Injectable 40 milliGRAM(s) IV Push two times a day  potassium chloride    Tablet ER 20 milliEquivalent(s) Oral daily  simvastatin 5 milliGRAM(s) Oral at bedtime    MEDICATIONS  (PRN):  heparin  Injectable 9000 Unit(s) IV Push every 6 hours PRN For aPTT less than 40  heparin  Injectable 4000 Unit(s) IV Push every 6 hours PRN For aPTT between 40 - 57  sodium chloride 0.65% Nasal 1 Spray(s) Both Nostrils four times a day PRN Nasal Congestion    LABS:                        9.3    9.18  )-----------( 328      ( 10 Dec 2019 05:34 )             30.8   PTT - ( 10 Dec 2019 05:34 )  PTT:97.0 sec     ASSESSMENT AND PLAN:  ·	SOB, multifactorial.  ·	Chronic hypoxic hypercarbic Respiratory failure.  ·	CHF by history.  ·	pHTN.  ·	Atrial Fibrillation.  ·	S/P Mitral and Tricuspid Valve replacement.  ·	Severe Anemia.  ·	Suspect GI bleed.  ·	Renal Insuffiencey.  ·	Obesity.  ·	MIGUEL, did not tolerate CPAP.    EGD cancelled as h & h is stable.  Continue O2, nebulizer and diuretics.

## 2019-12-11 LAB
APTT BLD: 43.9 SEC — HIGH (ref 27.5–36.3)
APTT BLD: 65.6 SEC — HIGH (ref 28.5–37)
APTT BLD: 78.1 SEC — HIGH (ref 27.5–36.3)
HCT VFR BLD CALC: 31.2 % — LOW (ref 34.5–45)
HCT VFR BLD CALC: 31.5 % — LOW (ref 34.5–45)
HGB BLD-MCNC: 9.1 G/DL — LOW (ref 11.5–15.5)
HGB BLD-MCNC: 9.2 G/DL — LOW (ref 11.5–15.5)
MCHC RBC-ENTMCNC: 28.3 PG — SIGNIFICANT CHANGE UP (ref 27–34)
MCHC RBC-ENTMCNC: 28.8 PG — SIGNIFICANT CHANGE UP (ref 27–34)
MCHC RBC-ENTMCNC: 28.9 GM/DL — LOW (ref 32–36)
MCHC RBC-ENTMCNC: 29.5 GM/DL — LOW (ref 32–36)
MCV RBC AUTO: 97.5 FL — SIGNIFICANT CHANGE UP (ref 80–100)
MCV RBC AUTO: 97.8 FL — SIGNIFICANT CHANGE UP (ref 80–100)
NRBC # BLD: 0 /100 WBCS — SIGNIFICANT CHANGE UP (ref 0–0)
NRBC # BLD: 0 /100 WBCS — SIGNIFICANT CHANGE UP (ref 0–0)
PLATELET # BLD AUTO: 310 K/UL — SIGNIFICANT CHANGE UP (ref 150–400)
PLATELET # BLD AUTO: 312 K/UL — SIGNIFICANT CHANGE UP (ref 150–400)
RBC # BLD: 3.2 M/UL — LOW (ref 3.8–5.2)
RBC # BLD: 3.22 M/UL — LOW (ref 3.8–5.2)
RBC # FLD: 14.7 % — HIGH (ref 10.3–14.5)
RBC # FLD: 14.7 % — HIGH (ref 10.3–14.5)
WBC # BLD: 7.93 K/UL — SIGNIFICANT CHANGE UP (ref 3.8–10.5)
WBC # BLD: 8.26 K/UL — SIGNIFICANT CHANGE UP (ref 3.8–10.5)
WBC # FLD AUTO: 7.93 K/UL — SIGNIFICANT CHANGE UP (ref 3.8–10.5)
WBC # FLD AUTO: 8.26 K/UL — SIGNIFICANT CHANGE UP (ref 3.8–10.5)

## 2019-12-11 RX ORDER — WARFARIN SODIUM 2.5 MG/1
7.5 TABLET ORAL ONCE
Refills: 0 | Status: COMPLETED | OUTPATIENT
Start: 2019-12-11 | End: 2019-12-11

## 2019-12-11 RX ADMIN — Medication 100 MILLIGRAM(S): at 17:02

## 2019-12-11 RX ADMIN — Medication 40 MILLIGRAM(S): at 05:55

## 2019-12-11 RX ADMIN — Medication 500 MICROGRAM(S): at 05:42

## 2019-12-11 RX ADMIN — PANTOPRAZOLE SODIUM 40 MILLIGRAM(S): 20 TABLET, DELAYED RELEASE ORAL at 05:55

## 2019-12-11 RX ADMIN — Medication 20 MILLIEQUIVALENT(S): at 11:05

## 2019-12-11 RX ADMIN — HEPARIN SODIUM 1400 UNIT(S)/HR: 5000 INJECTION INTRAVENOUS; SUBCUTANEOUS at 11:15

## 2019-12-11 RX ADMIN — PANTOPRAZOLE SODIUM 40 MILLIGRAM(S): 20 TABLET, DELAYED RELEASE ORAL at 17:02

## 2019-12-11 RX ADMIN — Medication 500 MICROGRAM(S): at 17:00

## 2019-12-11 RX ADMIN — HEPARIN SODIUM 4000 UNIT(S): 5000 INJECTION INTRAVENOUS; SUBCUTANEOUS at 11:17

## 2019-12-11 RX ADMIN — Medication 500 MICROGRAM(S): at 00:01

## 2019-12-11 RX ADMIN — WARFARIN SODIUM 7.5 MILLIGRAM(S): 2.5 TABLET ORAL at 21:49

## 2019-12-11 RX ADMIN — Medication 25 MILLIGRAM(S): at 05:55

## 2019-12-11 RX ADMIN — Medication 500 MICROGRAM(S): at 11:05

## 2019-12-11 RX ADMIN — HEPARIN SODIUM 1200 UNIT(S)/HR: 5000 INJECTION INTRAVENOUS; SUBCUTANEOUS at 02:42

## 2019-12-11 RX ADMIN — Medication 100 MILLIGRAM(S): at 05:55

## 2019-12-11 RX ADMIN — HEPARIN SODIUM 1400 UNIT(S)/HR: 5000 INJECTION INTRAVENOUS; SUBCUTANEOUS at 19:24

## 2019-12-11 RX ADMIN — Medication 0.12 MILLIGRAM(S): at 05:55

## 2019-12-11 RX ADMIN — SIMVASTATIN 5 MILLIGRAM(S): 20 TABLET, FILM COATED ORAL at 21:49

## 2019-12-11 NOTE — PROGRESS NOTE ADULT - SUBJECTIVE AND OBJECTIVE BOX
INTERVAL HPI:  69 year female with HTN, A fib, Mitral & Tricuspid valve replacement, CHF, COPD(never smoked) on home O2, Obesity, MIGUEL(did not tolerate CPAP), pHTN, Gout  Brought by EMS c/o progressively sob,  dizziness for two weeks,  unable to walk about 1/2 block. Increases  sob to laid down and decreases to sit up. Denies cough, chest pain, nausea, vomiting, fever, chills, abd. Also c/o black stool in the past one to two weeks, stopped taking iron pills three weeks ago.  Never smoked.    OVERNIGHT EVENTS:  Comfortable and feels better..    Vital Signs Last 24 Hrs  T(C): 36.7 (11 Dec 2019 17:12), Max: 36.7 (11 Dec 2019 17:12)  T(F): 98 (11 Dec 2019 17:12), Max: 98 (11 Dec 2019 17:12)  HR: 63 (11 Dec 2019 17:12) (61 - 68)  BP: 143/72 (11 Dec 2019 17:12) (122/55 - 143/72)  BP(mean): --  RR: 19 (11 Dec 2019 17:12) (18 - 19)  SpO2: 98% (11 Dec 2019 17:12) (95% - 99%)    PHYSICAL EXAM:  GEN:         Awake, responsive and comfortable.  HEENT:    Normal.    RESP:        no wheezing.  CVS:          Regular rate and rhythm.   ABD:         Soft, non-tender, non-distended;     MEDICATIONS  (STANDING):  allopurinol 100 milliGRAM(s) Oral two times a day  digoxin     Tablet 0.125 milliGRAM(s) Oral daily  furosemide    Tablet 40 milliGRAM(s) Oral daily  heparin  Infusion. 1200 Unit(s)/Hr (12 mL/Hr) IV Continuous <Continuous>  ipratropium    for Nebulization 500 MICROGram(s) Nebulizer every 6 hours  metoprolol succinate ER 25 milliGRAM(s) Oral daily  pantoprazole  Injectable 40 milliGRAM(s) IV Push two times a day  potassium chloride    Tablet ER 20 milliEquivalent(s) Oral daily  simvastatin 5 milliGRAM(s) Oral at bedtime  warfarin 7.5 milliGRAM(s) Oral once    MEDICATIONS  (PRN):  heparin  Injectable 9000 Unit(s) IV Push every 6 hours PRN For aPTT less than 40  heparin  Injectable 4000 Unit(s) IV Push every 6 hours PRN For aPTT between 40 - 57  sodium chloride 0.65% Nasal 1 Spray(s) Both Nostrils four times a day PRN Nasal Congestion    LABS:                        9.1    7.93  )-----------( 312      ( 11 Dec 2019 06:28 )             31.5     PT/INR - ( 10 Dec 2019 18:24 )   PT: 12.7 sec;   INR: 1.13 ratio      PTT - ( 11 Dec 2019 10:15 )  PTT:43.9 sec     ASSESSMENT AND PLAN:  ·	SOB, multifactorial.  ·	Chronic hypoxic hypercarbic Respiratory failure.  ·	CHF by history.  ·	pHTN.  ·	Atrial Fibrillation.  ·	S/P Mitral and Tricuspid Valve replacement.  ·	Severe Anemia.  ·	Suspect GI bleed.  ·	Renal Insuffiencey.  ·	Obesity.  ·	MIGUEL, did not tolerate CPAP.    Continue diuretics and nebulizer.

## 2019-12-11 NOTE — PROGRESS NOTE ADULT - SUBJECTIVE AND OBJECTIVE BOX
Patient is a 69y old  Female who presents with a chief complaint of Lethargy, SOB (10 Dec 2019 17:10)      INTERVAL HPI/OVERNIGHT EVENTS:    MEDICATIONS  (STANDING):  allopurinol 100 milliGRAM(s) Oral two times a day  digoxin     Tablet 0.125 milliGRAM(s) Oral daily  furosemide    Tablet 40 milliGRAM(s) Oral daily  heparin  Infusion. 1200 Unit(s)/Hr (12 mL/Hr) IV Continuous <Continuous>  ipratropium    for Nebulization 500 MICROGram(s) Nebulizer every 6 hours  metoprolol succinate ER 25 milliGRAM(s) Oral daily  pantoprazole  Injectable 40 milliGRAM(s) IV Push two times a day  potassium chloride    Tablet ER 20 milliEquivalent(s) Oral daily  simvastatin 5 milliGRAM(s) Oral at bedtime  warfarin 7.5 milliGRAM(s) Oral once    MEDICATIONS  (PRN):  heparin  Injectable 9000 Unit(s) IV Push every 6 hours PRN For aPTT less than 40  heparin  Injectable 4000 Unit(s) IV Push every 6 hours PRN For aPTT between 40 - 57  sodium chloride 0.65% Nasal 1 Spray(s) Both Nostrils four times a day PRN Nasal Congestion      Allergies    No Known Allergies    Intolerances        REVIEW OF SYSTEMS:  CONSTITUTIONAL: No fever, weight loss, or fatigue  EYES: No eye pain, visual disturbances, or discharge  ENMT:  No difficulty hearing, tinnitus, vertigo; No sinus or throat pain  NECK: No pain or stiffness  BREASTS: No pain, masses, or nipple discharge  RESPIRATORY: No cough, wheezing, chills or hemoptysis; No shortness of breath  CARDIOVASCULAR: No chest pain, palpitations, dizziness, or leg swelling  GASTROINTESTINAL: No abdominal or epigastric pain. No nausea, vomiting, or hematemesis; No diarrhea or constipation. No melena or hematochezia.  GENITOURINARY: No dysuria, frequency, hematuria, or incontinence  NEUROLOGICAL: No headaches, memory loss, loss of strength, numbness, or tremors  SKIN: No itching, burning, rashes, or lesions   LYMPH NODES: No enlarged glands  ENDOCRINE: No heat or cold intolerance; No hair loss  MUSCULOSKELETAL: No joint pain or swelling; No muscle, back, or extremity pain  PSYCHIATRIC: No depression, anxiety, mood swings, or difficulty sleeping  HEME/LYMPH: No easy bruising, or bleeding gums  ALLERGY AND IMMUNOLOGIC: No hives or eczema    Vital Signs Last 24 Hrs  T(C): 36.1 (11 Dec 2019 05:06), Max: 36.7 (10 Dec 2019 14:50)  T(F): 97 (11 Dec 2019 05:06), Max: 98 (10 Dec 2019 14:50)  HR: 68 (11 Dec 2019 05:06) (61 - 77)  BP: 122/55 (11 Dec 2019 05:06) (122/55 - 143/74)  BP(mean): --  RR: 18 (11 Dec 2019 05:06) (17 - 18)  SpO2: 96% (11 Dec 2019 05:06) (95% - 98%)    PHYSICAL EXAM:  GENERAL: NAD, well-groomed, Obese  HEAD:  Atraumatic, Normocephalic  EYES: EOMI, PERRL, conjunctiva and sclera clear  ENMT:  Moist mucous membranes, Good dentition, No lesions  NECK: Supple, No JVD, Normal thyroid  NERVOUS SYSTEM:  Alert & Oriented X3, Good concentration; Motor Strength 4/5 B/L upper and lower extremities; DTRs 2+ intact and symmetric  CHEST/LUNG: Clear to percussion bilaterally; No rales, rhonchi, wheezing, or rubs  HEART: Regular rate and rhythm; No murmurs, rubs, or gallops  ABDOMEN: Soft, Nontender, Nondistended; Bowel sounds present  EXTREMITIES:  2+ Peripheral Pulses, No clubbing, cyanosis, or edema  LYMPH: No lymphadenopathy noted  SKIN: No rashes or lesions    LABS:                        9.1    7.93  )-----------( 312      ( 11 Dec 2019 06:28 )             31.5           PT/INR - ( 10 Dec 2019 18:24 )   PT: 12.7 sec;   INR: 1.13 ratio         PTT - ( 11 Dec 2019 10:15 )  PTT:43.9 sec    CAPILLARY BLOOD GLUCOSE      RADIOLOGY & ADDITIONAL TESTS:    Imaging Personally Reviewed:  [ ] YES  [ ] NO    Consultant(s) Notes Reviewed:  [ ] YES  [ ] NO    Care Discussed with Consultants/Other Providers [ ] YES  [ ] NO    PROBLEMS:  GI BLEED  SHORTNESS OF BREATH  GI bleed  Atrial fibrillation, unspecified type  Gastrointestinal hemorrhage with melena  Mitral valve replaced  Longstanding persistent atrial fibrillation  Pulmonary hypertension  MIGUEL (obstructive sleep apnea)  Chronic obstructive pulmonary disease, unspecified COPD type  Gout  Essential hypertension  Gastrointestinal hemorrhage associated with anorectal source      Care discussed with family,         [  ]   yes  [  ]  No    imp:    stable[ ]    unstable[  ]     improving [   ]       unchanged  [  ]                Plans:  Continue present plans  [  ]               New consult [  ]   specialty  .......               order maribell[  ]    test name.                  Discharge Planning  [  ]

## 2019-12-11 NOTE — PROGRESS NOTE ADULT - SUBJECTIVE AND OBJECTIVE BOX
Gastroenterology  Patient seen and examined bedside resting comfortably.  No complaints offered.   Brown stool.     T(F): 97.2 (12-11-19 @ 11:35), Max: 98 (12-10-19 @ 14:50)  HR: 63 (12-11-19 @ 11:35) (61 - 77)  BP: 142/64 (12-11-19 @ 11:35) (122/55 - 143/74)  RR: 18 (12-11-19 @ 11:35) (17 - 18)  SpO2: 99% (12-11-19 @ 11:35) (95% - 99%)  Wt(kg): --  CAPILLARY BLOOD GLUCOSE        PHYSICAL EXAM:  General: NAD, morbid obese  Neuro:  Alert & responsive  HEENT: NCAT, EOMI, conjunctiva clear, + NC   CV: +S1+S2 regular rate and rhythm  Lung: clear to ausculation bilaterally, respirations nonlabored, good inspiratory effort  Abdomen: soft, Non Tender, No distention Normal active BS  Extremities: no cyanosis, clubbing or edema    LABS:                        9.1    7.93  )-----------( 312      ( 11 Dec 2019 06:28 )             31.5             PT/INR - ( 10 Dec 2019 18:24 )   PT: 12.7 sec;   INR: 1.13 ratio         PTT - ( 11 Dec 2019 10:15 )  PTT:43.9 sec  I&O's Detail    10 Dec 2019 07:01  -  11 Dec 2019 07:00  --------------------------------------------------------  IN:    heparin  Infusion.: 144 mL    Oral Fluid: 150 mL  Total IN: 294 mL    OUT:  Total OUT: 0 mL    Total NET: 294 mL

## 2019-12-11 NOTE — PROGRESS NOTE ADULT - ASSESSMENT
IMPROVE VTE Individual Risk Assessment    RISK                                                                Points    [  ] Previous VTE                                                  3    [  ] Thrombophilia                                               2    [  ] Lower limb paralysis                                      2        (unable to hold up >15 seconds)      [  ] Current Cancer                                              2         (within 6 months)    [x  ] Immobilization > 24 hrs                                1    [  ] ICU/CCU stay > 24 hours                              1    [x  ] Age > 60                                                      1    IMPROVE VTE Score _2________    IMPROVE Score 0-1: Low Risk, No VTE prophylaxis required for most patients, encourage ambulation.   IMPROVE Score 2-3: At risk, pharmacologic VTE prophylaxis is indicated for most patients (in the absence of a contraindication)  IMPROVE Score > or = 4: High Risk, pharmacologic VTE prophylaxis is indicated for most patients (in the absence of a contraindication)    No Phar. A/C 2* GIB  11/28/19 : Asymptomatic at rest. Receiving blood transfusion. Obese. Clear lungs. RSR. GI consult.  11/29/19 : No overt bleeding. repeat Hb pending. Spoke to the cardiologist, will wait for INR to normalize before clearing for EGD if needed.  11/30/19 : Hb 8.0 No overt bleeding. INR therapeutic. Follow CBC. Pt. asymptomatic  12/01//19 : Mild SOB. Hb. trending down, transfuse 1 unit PRBC. INR 2.40. Continue monitoring Hb.  12/02/19 : Asymptomatic. S/P Transfusion. Monitor CBC. Hb 9.4.  12/03/19 : Still has dark stools. Hb. dropped to 8.5 . Spoke to Dr. Manning, may transfer to [facility].  12/04/19 : Pt. alert. Melanotic stool present. Hb dropped again. Cardiology consult noted, cleared for EGD. GI f/u.  12/05/19 : Alert, asymptomatic. Follow CBC. BUN improved. GI F/U.  12/06/19 : EGD post poned 2* Strerilization problems. Will need A/C for bridging. Discussed with Dr. Manning, want to start on IV Heparin.  monitor cbc.  12/07/19 : Awake, asymptomatic. On Fuul heparinization. Hb 7.9. Monitor CBC.   12/08/19 : On IV heparin, Hb 7.7, will transfuse . GI f/u.  12/09/19 : S/P Transfusion. FOR EGD today. Heparin held.  12/10/19 : EGD could not be done yesterday. Scheduled for today. Heparin held. Hb stable.  12/11/19 : No EGD planned. As per Anesthesia, pt. is high risk. Discussed with Dr. Manning. Restart on coumadin. May need to transfer to [place] if bleeds again.

## 2019-12-11 NOTE — PROGRESS NOTE ADULT - ASSESSMENT
69 AAF with PMHx MVR, TVR, afib on coumadin, HTN, CHF, COPD on home O2, pulmonary hypertension, morbid obesity who presents to the hospital with symptomatic anemia secondary to overt GI bleeding (melena), likely upper GI source.     VSS on presentation. Labs reveal Hg 5.3, BUN 84, Cr 1.54, INR 3.8.     Likely upper GI bleeding in the setting of melena, anemia, elevated BUN. Differential includes PUD, AVM, dieulafoy, esophagitis/gastritis.   **Given comorbidities she is very high risk for sedation; advise for cardiac clearance and anesthia evaluation prior to endoscopic evaluation    11/29: Hg 6.5 s/p 3 units pRBC, repeat Hg pending. Denies any active bleeding   11/30: HGB 8.0 yesterday, today's pending, No signs of active bleeding  12/1: HGB 7.3, drifting downwards. INR 2.4. No signs of active bleeding  12/2: Hg 9.4 s/p 3 units (total 7 units). No signs of active bleeding. Long discussion with cardiology Dr. Castillo that she is very high risk for sedation given valvulopathy, obesity, severe COPD On home O2, CHF. Would avoid sedation unless active hemorrhoids  12/3: Hg 8.5. 1 firm black bowel movement. INR 1.35  12/4: Hg 7.9. 1 formed black bowel movement.   12/5: Hg 8.2. No stools today.   12/6: 1 black formed stool. Plan for EGD today however OR cancelled given broken endoscope sterilizer.   12/7: Hg 7.7, on heparin drip. no stools today.   12/8: Hg 7.7, on heparin drip. 1 brown stool (improved)  12/10: Hg 9.4, s/p 2 units (9 units total). Brown stool. Hg stable x 48 hours   12/11: hg 9.1, brown stool     ***After long discussion with anesthesia regarding risks of procedure and with patient, given stable hg on heparin drip with brown stool, decision made to monitor for bleeding and hold off on endoscopy.

## 2019-12-12 LAB
APTT BLD: 77.4 SEC — HIGH (ref 27.5–36.3)
HCT VFR BLD CALC: 31 % — LOW (ref 34.5–45)
HGB BLD-MCNC: 9.3 G/DL — LOW (ref 11.5–15.5)
INR BLD: 1.11 RATIO — SIGNIFICANT CHANGE UP (ref 0.88–1.16)
MCHC RBC-ENTMCNC: 29.2 PG — SIGNIFICANT CHANGE UP (ref 27–34)
MCHC RBC-ENTMCNC: 30 GM/DL — LOW (ref 32–36)
MCV RBC AUTO: 97.5 FL — SIGNIFICANT CHANGE UP (ref 80–100)
NRBC # BLD: 0 /100 WBCS — SIGNIFICANT CHANGE UP (ref 0–0)
PLATELET # BLD AUTO: 302 K/UL — SIGNIFICANT CHANGE UP (ref 150–400)
PROTHROM AB SERPL-ACNC: 12.5 SEC — SIGNIFICANT CHANGE UP (ref 10–12.9)
RBC # BLD: 3.18 M/UL — LOW (ref 3.8–5.2)
RBC # FLD: 14.6 % — HIGH (ref 10.3–14.5)
WBC # BLD: 8.06 K/UL — SIGNIFICANT CHANGE UP (ref 3.8–10.5)
WBC # FLD AUTO: 8.06 K/UL — SIGNIFICANT CHANGE UP (ref 3.8–10.5)

## 2019-12-12 RX ORDER — WARFARIN SODIUM 2.5 MG/1
7.5 TABLET ORAL ONCE
Refills: 0 | Status: COMPLETED | OUTPATIENT
Start: 2019-12-12 | End: 2019-12-12

## 2019-12-12 RX ORDER — ACETAMINOPHEN 500 MG
650 TABLET ORAL EVERY 6 HOURS
Refills: 0 | Status: DISCONTINUED | OUTPATIENT
Start: 2019-12-12 | End: 2019-12-18

## 2019-12-12 RX ADMIN — Medication 500 MICROGRAM(S): at 00:14

## 2019-12-12 RX ADMIN — WARFARIN SODIUM 7.5 MILLIGRAM(S): 2.5 TABLET ORAL at 21:53

## 2019-12-12 RX ADMIN — HEPARIN SODIUM 1400 UNIT(S)/HR: 5000 INJECTION INTRAVENOUS; SUBCUTANEOUS at 01:42

## 2019-12-12 RX ADMIN — PANTOPRAZOLE SODIUM 40 MILLIGRAM(S): 20 TABLET, DELAYED RELEASE ORAL at 05:28

## 2019-12-12 RX ADMIN — Medication 100 MILLIGRAM(S): at 05:28

## 2019-12-12 RX ADMIN — Medication 20 MILLIEQUIVALENT(S): at 11:57

## 2019-12-12 RX ADMIN — Medication 500 MICROGRAM(S): at 17:33

## 2019-12-12 RX ADMIN — Medication 0.12 MILLIGRAM(S): at 05:28

## 2019-12-12 RX ADMIN — Medication 25 MILLIGRAM(S): at 05:28

## 2019-12-12 RX ADMIN — Medication 100 MILLIGRAM(S): at 18:27

## 2019-12-12 RX ADMIN — Medication 500 MICROGRAM(S): at 06:19

## 2019-12-12 RX ADMIN — SIMVASTATIN 5 MILLIGRAM(S): 20 TABLET, FILM COATED ORAL at 21:53

## 2019-12-12 RX ADMIN — Medication 500 MICROGRAM(S): at 11:11

## 2019-12-12 RX ADMIN — Medication 40 MILLIGRAM(S): at 05:28

## 2019-12-12 RX ADMIN — PANTOPRAZOLE SODIUM 40 MILLIGRAM(S): 20 TABLET, DELAYED RELEASE ORAL at 18:27

## 2019-12-12 NOTE — PROGRESS NOTE ADULT - ASSESSMENT
69 AAF with PMHx MVR, TVR, afib on coumadin, HTN, CHF, COPD on home O2, pulmonary hypertension, morbid obesity who presents to the hospital with symptomatic anemia secondary to overt GI bleeding (melena), likely upper GI source.     VSS on presentation. Labs reveal Hg 5.3, BUN 84, Cr 1.54, INR 3.8.     Likely upper GI bleeding in the setting of melena, anemia, elevated BUN. Differential includes PUD, AVM, dieulafoy, esophagitis/gastritis.   **Given comorbidities she is very high risk for sedation; advise for cardiac clearance and anesthia evaluation prior to endoscopic evaluation    11/29: Hg 6.5 s/p 3 units pRBC, repeat Hg pending. Denies any active bleeding   11/30: HGB 8.0 yesterday, today's pending, No signs of active bleeding  12/1: HGB 7.3, drifting downwards. INR 2.4. No signs of active bleeding  12/2: Hg 9.4 s/p 3 units (total 7 units). No signs of active bleeding. Long discussion with cardiology Dr. Castillo that she is very high risk for sedation given valvulopathy, obesity, severe COPD On home O2, CHF. Would avoid sedation unless active hemorrhoids  12/3: Hg 8.5. 1 firm black bowel movement. INR 1.35  12/4: Hg 7.9. 1 formed black bowel movement.   12/5: Hg 8.2. No stools today.   12/6: 1 black formed stool. Plan for EGD today however OR cancelled given broken endoscope sterilizer.   12/7: Hg 7.7, on heparin drip. no stools today.   12/8: Hg 7.7, on heparin drip. 1 brown stool (improved)  12/10: Hg 9.4, s/p 2 units (9 units total). Brown stool. Hg stable x 48 hours   12/11: hg 9.1, brown stool   12/12: hg 9.3, brown stool    ***After long discussion with anesthesia regarding risks of procedure and with patient, given stable hg on heparin drip with brown stool, decision made to monitor for bleeding and hold off on endoscopy.

## 2019-12-12 NOTE — PROGRESS NOTE ADULT - ASSESSMENT
IMPROVE VTE Individual Risk Assessment    RISK                                                                Points    [  ] Previous VTE                                                  3    [  ] Thrombophilia                                               2    [  ] Lower limb paralysis                                      2        (unable to hold up >15 seconds)      [  ] Current Cancer                                              2         (within 6 months)    [x  ] Immobilization > 24 hrs                                1    [  ] ICU/CCU stay > 24 hours                              1    [x  ] Age > 60                                                      1    IMPROVE VTE Score _2________    IMPROVE Score 0-1: Low Risk, No VTE prophylaxis required for most patients, encourage ambulation.   IMPROVE Score 2-3: At risk, pharmacologic VTE prophylaxis is indicated for most patients (in the absence of a contraindication)  IMPROVE Score > or = 4: High Risk, pharmacologic VTE prophylaxis is indicated for most patients (in the absence of a contraindication)    No Phar. A/C 2* GIB  11/28/19 : Asymptomatic at rest. Receiving blood transfusion. Obese. Clear lungs. RSR. GI consult.  11/29/19 : No overt bleeding. repeat Hb pending. Spoke to the cardiologist, will wait for INR to normalize before clearing for EGD if needed.  11/30/19 : Hb 8.0 No overt bleeding. INR therapeutic. Follow CBC. Pt. asymptomatic  12/01//19 : Mild SOB. Hb. trending down, transfuse 1 unit PRBC. INR 2.40. Continue monitoring Hb.  12/02/19 : Asymptomatic. S/P Transfusion. Monitor CBC. Hb 9.4.  12/03/19 : Still has dark stools. Hb. dropped to 8.5 . Spoke to Dr. Manning, may transfer to [facility].  12/04/19 : Pt. alert. Melanotic stool present. Hb dropped again. Cardiology consult noted, cleared for EGD. GI f/u.  12/05/19 : Alert, asymptomatic. Follow CBC. BUN improved. GI F/U.  12/06/19 : EGD post poned 2* Strerilization problems. Will need A/C for bridging. Discussed with Dr. Manning, want to start on IV Heparin.  monitor cbc.  12/07/19 : Awake, asymptomatic. On Fuul heparinization. Hb 7.9. Monitor CBC.   12/08/19 : On IV heparin, Hb 7.7, will transfuse . GI f/u.  12/09/19 : S/P Transfusion. FOR EGD today. Heparin held.  12/10/19 : EGD could not be done yesterday. Scheduled for today. Heparin held. Hb stable.  12/11/19 : No EGD planned. As per Anesthesia, pt. is high risk. Discussed with Dr. Manning. Restart on coumadin. May need to transfer to [place] if bleeds again.  12/12/19 : Pt. stable. No further bleeding. On coumadin with Heparin bridging. INR subtherapeutic. Continue Heparin.

## 2019-12-12 NOTE — PROGRESS NOTE ADULT - SUBJECTIVE AND OBJECTIVE BOX
Patient is a 69y old  Female who presents with a chief complaint of Lethargy, SOB (11 Dec 2019 19:13)      INTERVAL HPI/OVERNIGHT EVENTS:    MEDICATIONS  (STANDING):  allopurinol 100 milliGRAM(s) Oral two times a day  digoxin     Tablet 0.125 milliGRAM(s) Oral daily  furosemide    Tablet 40 milliGRAM(s) Oral daily  heparin  Infusion. 1200 Unit(s)/Hr (12 mL/Hr) IV Continuous <Continuous>  ipratropium    for Nebulization 500 MICROGram(s) Nebulizer every 6 hours  metoprolol succinate ER 25 milliGRAM(s) Oral daily  pantoprazole  Injectable 40 milliGRAM(s) IV Push two times a day  potassium chloride    Tablet ER 20 milliEquivalent(s) Oral daily  simvastatin 5 milliGRAM(s) Oral at bedtime  warfarin 7.5 milliGRAM(s) Oral once    MEDICATIONS  (PRN):  heparin  Injectable 9000 Unit(s) IV Push every 6 hours PRN For aPTT less than 40  heparin  Injectable 4000 Unit(s) IV Push every 6 hours PRN For aPTT between 40 - 57  sodium chloride 0.65% Nasal 1 Spray(s) Both Nostrils four times a day PRN Nasal Congestion      Allergies    No Known Allergies    Intolerances        REVIEW OF SYSTEMS:  CONSTITUTIONAL: No fever, weight loss, or fatigue  EYES: No eye pain, visual disturbances, or discharge  ENMT:  No difficulty hearing, tinnitus, vertigo; No sinus or throat pain  NECK: No pain or stiffness  BREASTS: No pain, masses, or nipple discharge  RESPIRATORY: No cough, wheezing, chills or hemoptysis; No shortness of breath  CARDIOVASCULAR: No chest pain, palpitations, dizziness, or leg swelling  GASTROINTESTINAL: No abdominal or epigastric pain. No nausea, vomiting, or hematemesis; No diarrhea or constipation. No melena or hematochezia.  GENITOURINARY: No dysuria, frequency, hematuria, or incontinence  NEUROLOGICAL: No headaches, memory loss, loss of strength, numbness, or tremors  SKIN: No itching, burning, rashes, or lesions   LYMPH NODES: No enlarged glands  ENDOCRINE: No heat or cold intolerance; No hair loss  MUSCULOSKELETAL: No joint pain or swelling; No muscle, back, or extremity pain  PSYCHIATRIC: No depression, anxiety, mood swings, or difficulty sleeping  HEME/LYMPH: No easy bruising, or bleeding gums  ALLERGY AND IMMUNOLOGIC: No hives or eczema    Vital Signs Last 24 Hrs  T(C): 36.3 (12 Dec 2019 11:29), Max: 36.7 (11 Dec 2019 17:12)  T(F): 97.4 (12 Dec 2019 11:29), Max: 98 (11 Dec 2019 17:12)  HR: 75 (12 Dec 2019 11:29) (61 - 75)  BP: 113/55 (12 Dec 2019 11:29) (113/55 - 147/79)  BP(mean): --  RR: 18 (12 Dec 2019 11:29) (18 - 19)  SpO2: 98% (12 Dec 2019 11:29) (96% - 99%)    PHYSICAL EXAM:  GENERAL: NAD, well-groomed, well-developed  HEAD:  Atraumatic, Normocephalic  EYES: EOMI, PERRL, conjunctiva and sclera clear  ENMT:  Moist mucous membranes, Good dentition, No lesions  NECK: Supple, No JVD, Normal thyroid  NERVOUS SYSTEM:  Alert & Oriented X3, Good concentration; Motor Strength 5/5 B/L upper and lower extremities; DTRs 2+ intact and symmetric  CHEST/LUNG: Clear to percussion bilaterally; No rales, rhonchi, wheezing, or rubs  HEART: Regular rate and rhythm; No murmurs, rubs, or gallops  ABDOMEN: Soft, Nontender, Nondistended; Bowel sounds present  EXTREMITIES:  2+ Peripheral Pulses, No clubbing, cyanosis, or edema  LYMPH: No lymphadenopathy noted  SKIN: No rashes or lesions    LABS:                        9.3    8.06  )-----------( 302      ( 12 Dec 2019 01:21 )             31.0           PT/INR - ( 12 Dec 2019 01:21 )   PT: 12.5 sec;   INR: 1.11 ratio         PTT - ( 12 Dec 2019 01:21 )  PTT:77.4 sec    CAPILLARY BLOOD GLUCOSE      RADIOLOGY & ADDITIONAL TESTS:    Imaging Personally Reviewed:  [ ] YES  [ ] NO    Consultant(s) Notes Reviewed:  [ ] YES  [ ] NO    Care Discussed with Consultants/Other Providers [ ] YES  [ ] NO    PROBLEMS:  GI BLEED  SHORTNESS OF BREATH  GI bleed  Atrial fibrillation, unspecified type  Gastrointestinal hemorrhage with melena  Mitral valve replaced  Longstanding persistent atrial fibrillation  Pulmonary hypertension  MIGUEL (obstructive sleep apnea)  Chronic obstructive pulmonary disease, unspecified COPD type  Gout  Essential hypertension  Gastrointestinal hemorrhage associated with anorectal source      Care discussed with family,         [  ]   yes  [  ]  No    imp:    stable[ ]    unstable[  ]     improving [   ]       unchanged  [  ]                Plans:  Continue present plans  [  ]               New consult [  ]   specialty  .......               order maribell[  ]    test name.                  Discharge Planning  [  ]

## 2019-12-12 NOTE — PROGRESS NOTE ADULT - SUBJECTIVE AND OBJECTIVE BOX
INTERVAL HPI:  69 year female with HTN, A fib, Mitral & Tricuspid valve replacement, CHF, COPD(never smoked) on home O2, Obesity, MIGUEL(did not tolerate CPAP), pHTN, Gout  Brought by EMS c/o progressively sob,  dizziness for two weeks,  unable to walk about 1/2 block. Increases  sob to laid down and decreases to sit up. Denies cough, chest pain, nausea, vomiting, fever, chills, abd. Also c/o black stool in the past one to two weeks, stopped taking iron pills three weeks ago.  Never smoked.    OVERNIGHT EVENTS:  Resting comfortably.    Vital Signs Last 24 Hrs  T(C): 36.3 (12 Dec 2019 11:29), Max: 36.7 (11 Dec 2019 17:12)  T(F): 97.4 (12 Dec 2019 11:29), Max: 98 (11 Dec 2019 17:12)  HR: 75 (12 Dec 2019 11:29) (61 - 75)  BP: 113/55 (12 Dec 2019 11:29) (113/55 - 147/79)  BP(mean): --  RR: 18 (12 Dec 2019 11:29) (18 - 19)  SpO2: 98% (12 Dec 2019 11:29) (96% - 99%)    PHYSICAL EXAM:  GEN:         Awake, responsive and comfortable.  HEENT:    Normal.    RESP:        no wheezing.  CVS:          Regular rate and rhythm.   ABD:         Soft, non-tender, non-distended;     MEDICATIONS  (STANDING):  allopurinol 100 milliGRAM(s) Oral two times a day  digoxin     Tablet 0.125 milliGRAM(s) Oral daily  furosemide    Tablet 40 milliGRAM(s) Oral daily  heparin  Infusion. 1200 Unit(s)/Hr (12 mL/Hr) IV Continuous <Continuous>  ipratropium    for Nebulization 500 MICROGram(s) Nebulizer every 6 hours  metoprolol succinate ER 25 milliGRAM(s) Oral daily  pantoprazole  Injectable 40 milliGRAM(s) IV Push two times a day  potassium chloride    Tablet ER 20 milliEquivalent(s) Oral daily  simvastatin 5 milliGRAM(s) Oral at bedtime  warfarin 7.5 milliGRAM(s) Oral once    MEDICATIONS  (PRN):  heparin  Injectable 9000 Unit(s) IV Push every 6 hours PRN For aPTT less than 40  heparin  Injectable 4000 Unit(s) IV Push every 6 hours PRN For aPTT between 40 - 57  sodium chloride 0.65% Nasal 1 Spray(s) Both Nostrils four times a day PRN Nasal Congestion    LABS:                        9.3    8.06  )-----------( 302      ( 12 Dec 2019 01:21 )             31.0   PT/INR - ( 12 Dec 2019 01:21 )   PT: 12.5 sec;   INR: 1.11 ratio      PTT - ( 12 Dec 2019 01:21 )  PTT:77.4 sec     ASSESSMENT AND PLAN:  ·	SOB, multifactorial.  ·	Chronic hypoxic hypercarbic Respiratory failure.  ·	CHF by history.  ·	pHTN.  ·	Atrial Fibrillation.  ·	S/P Mitral and Tricuspid Valve replacement.  ·	Severe Anemia.  ·	Suspect GI bleed.  ·	Renal Insuffiencey.  ·	Obesity.  ·	MIGUEL, did not tolerate CPAP.    Pulmonary close to base line.  Continue diuretics and nebulizer.

## 2019-12-12 NOTE — PROGRESS NOTE ADULT - SUBJECTIVE AND OBJECTIVE BOX
Gastroenterology  Patient seen and examined bedside resting comfortably.  No complaints offered.   Brown stool, tolerating diet     T(F): 97.4 (12-12-19 @ 11:29), Max: 98 (12-11-19 @ 17:12)  HR: 75 (12-12-19 @ 11:29) (61 - 75)  BP: 113/55 (12-12-19 @ 11:29) (113/55 - 147/79)  RR: 18 (12-12-19 @ 11:29) (18 - 19)  SpO2: 98% (12-12-19 @ 11:29) (96% - 99%)  Wt(kg): --  CAPILLARY BLOOD GLUCOSE          PHYSICAL EXAM:  General: NAD, obese   Neuro:  Alert & responsive  HEENT: NCAT, EOMI, conjunctiva clear  CV: +S1+S2 regular rate and rhythm  Lung: clear to ausculation bilaterally, respirations nonlabored, good inspiratory effort  Abdomen: soft, Non Tender, No distention Normal active BS  Extremities: no cyanosis, clubbing or edema    LABS:                        9.3    8.06  )-----------( 302      ( 12 Dec 2019 01:21 )             31.0             PT/INR - ( 12 Dec 2019 01:21 )   PT: 12.5 sec;   INR: 1.11 ratio         PTT - ( 12 Dec 2019 01:21 )  PTT:77.4 sec  I&O's Detail    11 Dec 2019 07:01  -  12 Dec 2019 07:00  --------------------------------------------------------  IN:    Oral Fluid: 320 mL  Total IN: 320 mL    OUT:  Total OUT: 0 mL    Total NET: 320 mL

## 2019-12-13 LAB
APTT BLD: 150 SEC — CRITICAL HIGH (ref 28.5–37)
APTT BLD: 85.8 SEC — HIGH (ref 27.5–36.3)
HCT VFR BLD CALC: 31.7 % — LOW (ref 34.5–45)
HGB BLD-MCNC: 9.2 G/DL — LOW (ref 11.5–15.5)
INR BLD: 1.18 RATIO — HIGH (ref 0.88–1.16)
MCHC RBC-ENTMCNC: 28.7 PG — SIGNIFICANT CHANGE UP (ref 27–34)
MCHC RBC-ENTMCNC: 29 GM/DL — LOW (ref 32–36)
MCV RBC AUTO: 98.8 FL — SIGNIFICANT CHANGE UP (ref 80–100)
NRBC # BLD: 0 /100 WBCS — SIGNIFICANT CHANGE UP (ref 0–0)
PLATELET # BLD AUTO: 309 K/UL — SIGNIFICANT CHANGE UP (ref 150–400)
PROTHROM AB SERPL-ACNC: 13.3 SEC — HIGH (ref 10–12.9)
RBC # BLD: 3.21 M/UL — LOW (ref 3.8–5.2)
RBC # FLD: 14.3 % — SIGNIFICANT CHANGE UP (ref 10.3–14.5)
WBC # BLD: 7.2 K/UL — SIGNIFICANT CHANGE UP (ref 3.8–10.5)
WBC # FLD AUTO: 7.2 K/UL — SIGNIFICANT CHANGE UP (ref 3.8–10.5)

## 2019-12-13 RX ORDER — WARFARIN SODIUM 2.5 MG/1
7.5 TABLET ORAL ONCE
Refills: 0 | Status: COMPLETED | OUTPATIENT
Start: 2019-12-13 | End: 2019-12-13

## 2019-12-13 RX ADMIN — Medication 0.12 MILLIGRAM(S): at 05:33

## 2019-12-13 RX ADMIN — HEPARIN SODIUM 0 UNIT(S)/HR: 5000 INJECTION INTRAVENOUS; SUBCUTANEOUS at 09:08

## 2019-12-13 RX ADMIN — PANTOPRAZOLE SODIUM 40 MILLIGRAM(S): 20 TABLET, DELAYED RELEASE ORAL at 05:33

## 2019-12-13 RX ADMIN — Medication 500 MICROGRAM(S): at 05:38

## 2019-12-13 RX ADMIN — Medication 100 MILLIGRAM(S): at 05:33

## 2019-12-13 RX ADMIN — SIMVASTATIN 5 MILLIGRAM(S): 20 TABLET, FILM COATED ORAL at 22:24

## 2019-12-13 RX ADMIN — HEPARIN SODIUM 1100 UNIT(S)/HR: 5000 INJECTION INTRAVENOUS; SUBCUTANEOUS at 18:26

## 2019-12-13 RX ADMIN — Medication 40 MILLIGRAM(S): at 05:33

## 2019-12-13 RX ADMIN — HEPARIN SODIUM 1100 UNIT(S)/HR: 5000 INJECTION INTRAVENOUS; SUBCUTANEOUS at 11:01

## 2019-12-13 RX ADMIN — Medication 500 MICROGRAM(S): at 17:02

## 2019-12-13 RX ADMIN — Medication 500 MICROGRAM(S): at 00:35

## 2019-12-13 RX ADMIN — WARFARIN SODIUM 7.5 MILLIGRAM(S): 2.5 TABLET ORAL at 22:24

## 2019-12-13 RX ADMIN — Medication 20 MILLIEQUIVALENT(S): at 12:28

## 2019-12-13 RX ADMIN — Medication 25 MILLIGRAM(S): at 05:33

## 2019-12-13 RX ADMIN — PANTOPRAZOLE SODIUM 40 MILLIGRAM(S): 20 TABLET, DELAYED RELEASE ORAL at 17:41

## 2019-12-13 RX ADMIN — Medication 100 MILLIGRAM(S): at 17:42

## 2019-12-13 RX ADMIN — Medication 500 MICROGRAM(S): at 11:53

## 2019-12-13 NOTE — PROGRESS NOTE ADULT - SUBJECTIVE AND OBJECTIVE BOX
Gastroenterology  Patient seen and examined bedside resting comfortably.  No complaints   Denies any GI bleeding    T(F): 97.3 (12-13-19 @ 05:10), Max: 98.1 (12-12-19 @ 17:37)  HR: 60 (12-13-19 @ 06:20) (60 - 75)  BP: 136/69 (12-13-19 @ 05:10) (113/55 - 148/77)  RR: 18 (12-13-19 @ 05:10) (18 - 18)  SpO2: 100% (12-13-19 @ 06:20) (97% - 100%)  Wt(kg): --  CAPILLARY BLOOD GLUCOSE          PHYSICAL EXAM:  General: NAD, obese   Neuro:  Alert & responsive  HEENT: NCAT, EOMI, conjunctiva clear, + NC   CV: +S1+S2 regular rate and rhythm  Lung: clear to ausculation bilaterally, respirations nonlabored, good inspiratory effort  Abdomen: soft, Non Tender, No distention Normal active BS  Extremities: no cyanosis, clubbing or edema    LABS:                        9.2    7.20  )-----------( 309      ( 13 Dec 2019 06:30 )             31.7             PT/INR - ( 13 Dec 2019 06:30 )   PT: 13.3 sec;   INR: 1.18 ratio         PTT - ( 13 Dec 2019 06:30 )  PTT:150.0 sec  I&O's Detail    12 Dec 2019 07:01  -  13 Dec 2019 07:00  --------------------------------------------------------  IN:    Oral Fluid: 150 mL  Total IN: 150 mL    OUT:  Total OUT: 0 mL    Total NET: 150 mL

## 2019-12-13 NOTE — PROGRESS NOTE ADULT - SUBJECTIVE AND OBJECTIVE BOX
Patient is a 69y old  Female who presents with a chief complaint of Lethargy, SOB (12 Dec 2019 12:05)      INTERVAL HPI/OVERNIGHT EVENTS:    MEDICATIONS  (STANDING):  allopurinol 100 milliGRAM(s) Oral two times a day  digoxin     Tablet 0.125 milliGRAM(s) Oral daily  furosemide    Tablet 40 milliGRAM(s) Oral daily  heparin  Infusion. 1200 Unit(s)/Hr (12 mL/Hr) IV Continuous <Continuous>  ipratropium    for Nebulization 500 MICROGram(s) Nebulizer every 6 hours  metoprolol succinate ER 25 milliGRAM(s) Oral daily  pantoprazole  Injectable 40 milliGRAM(s) IV Push two times a day  potassium chloride    Tablet ER 20 milliEquivalent(s) Oral daily  simvastatin 5 milliGRAM(s) Oral at bedtime  warfarin 7.5 milliGRAM(s) Oral once    MEDICATIONS  (PRN):  acetaminophen   Tablet .. 650 milliGRAM(s) Oral every 6 hours PRN Mild Pain (1 - 3)  heparin  Injectable 9000 Unit(s) IV Push every 6 hours PRN For aPTT less than 40  heparin  Injectable 4000 Unit(s) IV Push every 6 hours PRN For aPTT between 40 - 57  sodium chloride 0.65% Nasal 1 Spray(s) Both Nostrils four times a day PRN Nasal Congestion      Allergies    No Known Allergies    Intolerances        REVIEW OF SYSTEMS:  CONSTITUTIONAL: No fever, weight loss, or fatigue  EYES: No eye pain, visual disturbances, or discharge  ENMT:  No difficulty hearing, tinnitus, vertigo; No sinus or throat pain  NECK: No pain or stiffness  BREASTS: No pain, masses, or nipple discharge  RESPIRATORY: No cough, wheezing, chills or hemoptysis; No shortness of breath  CARDIOVASCULAR: No chest pain, palpitations, dizziness, or leg swelling  GASTROINTESTINAL: No abdominal or epigastric pain. No nausea, vomiting, or hematemesis; No diarrhea or constipation. No melena or hematochezia.  GENITOURINARY: No dysuria, frequency, hematuria, or incontinence  NEUROLOGICAL: No headaches, memory loss, loss of strength, numbness, or tremors  SKIN: No itching, burning, rashes, or lesions   LYMPH NODES: No enlarged glands  ENDOCRINE: No heat or cold intolerance; No hair loss  MUSCULOSKELETAL: No joint pain or swelling; No muscle, back, or extremity pain  PSYCHIATRIC: No depression, anxiety, mood swings, or difficulty sleeping  HEME/LYMPH: No easy bruising, or bleeding gums  ALLERGY AND IMMUNOLOGIC: No hives or eczema    Vital Signs Last 24 Hrs  T(C): 36.3 (13 Dec 2019 05:10), Max: 36.7 (12 Dec 2019 17:37)  T(F): 97.3 (13 Dec 2019 05:10), Max: 98.1 (12 Dec 2019 17:37)  HR: 60 (13 Dec 2019 06:20) (60 - 75)  BP: 136/69 (13 Dec 2019 05:10) (113/55 - 148/77)  BP(mean): --  RR: 18 (13 Dec 2019 05:10) (18 - 18)  SpO2: 100% (13 Dec 2019 06:20) (97% - 100%)    PHYSICAL EXAM:  GENERAL: NAD, well-groomed, well-developed  HEAD:  Atraumatic, Normocephalic  EYES: EOMI, PERRL, conjunctiva and sclera clear  ENMT:  Moist mucous membranes, Good dentition, No lesions  NECK: Supple, No JVD, Normal thyroid  NERVOUS SYSTEM:  Alert & Oriented X3, Good concentration; Motor Strength 5/5 B/L upper and lower extremities; DTRs 2+ intact and symmetric  CHEST/LUNG: Clear to percussion bilaterally; No rales, rhonchi, wheezing, or rubs  HEART: Regular rate and rhythm; No murmurs, rubs, or gallops  ABDOMEN: Soft, Nontender, Nondistended; Bowel sounds present  EXTREMITIES:  2+ Peripheral Pulses, No clubbing, cyanosis, or edema  LYMPH: No lymphadenopathy noted  SKIN: No rashes or lesions    LABS:                        9.2    7.20  )-----------( 309      ( 13 Dec 2019 06:30 )             31.7           PT/INR - ( 13 Dec 2019 06:30 )   PT: 13.3 sec;   INR: 1.18 ratio         PTT - ( 13 Dec 2019 06:30 )  PTT:150.0 sec    CAPILLARY BLOOD GLUCOSE                        RADIOLOGY & ADDITIONAL TESTS:    Imaging Personally Reviewed:  [ ] YES  [ ] NO    Consultant(s) Notes Reviewed:  [ ] YES  [ ] NO    Care Discussed with Consultants/Other Providers [ ] YES  [ ] NO    PROBLEMS:  GI BLEED  SHORTNESS OF BREATH  GI bleed  Atrial fibrillation, unspecified type  Gastrointestinal hemorrhage with melena  Mitral valve replaced  Longstanding persistent atrial fibrillation  Pulmonary hypertension  MIGUEL (obstructive sleep apnea)  Chronic obstructive pulmonary disease, unspecified COPD type  Gout  Essential hypertension  Gastrointestinal hemorrhage associated with anorectal source      Care discussed with family,         [  ]   yes  [  ]  No    imp:    stable[ ]    unstable[  ]     improving [   ]       unchanged  [  ]                Plans:  Continue present plans  [  ]               New consult [  ]   specialty  .......               order maribell[  ]    test name.                  Discharge Planning  [  ]

## 2019-12-13 NOTE — CHART NOTE - NSCHARTNOTEFT_GEN_A_CORE
Pt admitted c lethargy, SOB; found to have GI bleed. No EGD at this time as pt at high risk; no active bleeding at this time; to be monitored as outpatient; awaiting therapeutic INR level    Factors impacting intake: [X ] none [ ] nausea  [ ] vomiting [ ] diarrhea [ ] constipation  [ ]chewing problems [ ] swallowing issues  [ ] other:     Diet Prescription: Diet, DASH/TLC:   Sodium & Cholesterol Restricted  Dysphagia 3, Soft, Thin Liquids ([id number]) (12-09-19 @ 13:55)    Intake:  Pt eating well; % most meals    Current Weight:    last weight recorded 112.4 kg (12/4); admission wt 112.7 kg (11/27)  % Weight Change: stable    Physical Appearance:  1+ noted right arm    Pertinent Medications: MEDICATIONS  (STANDING):  allopurinol 100 milliGRAM(s) Oral two times a day  digoxin     Tablet 0.125 milliGRAM(s) Oral daily  furosemide    Tablet 40 milliGRAM(s) Oral daily  heparin  Infusion. 1200 Unit(s)/Hr (12 mL/Hr) IV Continuous <Continuous>  ipratropium    for Nebulization 500 MICROGram(s) Nebulizer every 6 hours  metoprolol succinate ER 25 milliGRAM(s) Oral daily  pantoprazole  Injectable 40 milliGRAM(s) IV Push two times a day  potassium chloride    Tablet ER 20 milliEquivalent(s) Oral daily  simvastatin 5 milliGRAM(s) Oral at bedtime  warfarin 7.5 milliGRAM(s) Oral once    MEDICATIONS  (PRN):  acetaminophen   Tablet .. 650 milliGRAM(s) Oral every 6 hours PRN Mild Pain (1 - 3)  heparin  Injectable 9000 Unit(s) IV Push every 6 hours PRN For aPTT less than 40  heparin  Injectable 4000 Unit(s) IV Push every 6 hours PRN For aPTT between 40 - 57  sodium chloride 0.65% Nasal 1 Spray(s) Both Nostrils four times a day PRN Nasal Congestion    Pertinent Labs:   last nutrition-related labs: BUN 52H, Cr 1.60H, Glu 147H, GFR 37L    Skin:  no pressure ulcers noted    Estimated Needs:   [X ] no change since previous assessment (11/30)  [ ] recalculated:     Previous Nutrition Diagnosis:   NONE    Nutrition Diagnosis is [ ] ongoing  [ ] resolved [X ] not applicable     New Nutrition Diagnosis: [X ] not applicable    Interventions:   continue current diet rx as noted  Recommend  [ ] Change Diet To:  [ ] Nutrition Supplement  [ ] Nutrition Support  [ ] Other:     Monitoring and Evaluation:   [X ] PO intake [ x ] Tolerance to diet prescription [ x ] weights [ x ] labs[ x ] follow up per protocol  [ ] other:

## 2019-12-13 NOTE — PROGRESS NOTE ADULT - ASSESSMENT
69 AAF with PMHx MVR, TVR, afib on coumadin, HTN, CHF, COPD on home O2, pulmonary hypertension, morbid obesity who presents to the hospital with symptomatic anemia secondary to overt GI bleeding (melena), likely upper GI source. VSS on presentation. Labs reveal Hg 5.3, BUN 84, Cr 1.54, INR 3.8.     Likely upper GI bleeding in the setting of melena, anemia, elevated BUN. Differential includes PUD, AVM, dieulafoy, esophagitis/gastritis.   **Given comorbidities she is very high risk for sedation; advise for cardiac clearance and anesthia evaluation prior to endoscopic evaluation  ***After long discussion with anesthesia regarding risks of procedure and with patient, given stable hg on heparin drip with brown stool, decision made to monitor for bleeding and hold off on endoscopy.     She is s/p 9 units total pRBCs. Previously having daily black bowel movements however now having brown bowel movements with stable H&H. She remains on heparin drip briding to coumadin.

## 2019-12-13 NOTE — PROGRESS NOTE ADULT - SUBJECTIVE AND OBJECTIVE BOX
INTERVAL HPI:  69 year female with HTN, A fib, Mitral & Tricuspid valve replacement, CHF, COPD(never smoked) on home O2, Obesity, MIGUEL(did not tolerate CPAP), pHTN, Gout  Brought by EMS c/o progressively sob,  dizziness for two weeks,  unable to walk about 1/2 block. Increases  sob to laid down and decreases to sit up. Denies cough, chest pain, nausea, vomiting, fever, chills, abd. Also c/o black stool in the past one to two weeks, stopped taking iron pills three weeks ago.  Never smoked.    OVERNIGHT EVENTS:  Comfortable in bed.    Vital Signs Last 24 Hrs  T(C): 36.6 (13 Dec 2019 11:15), Max: 36.7 (12 Dec 2019 17:37)  T(F): 97.9 (13 Dec 2019 11:15), Max: 98.1 (12 Dec 2019 17:37)  HR: 68 (13 Dec 2019 11:55) (60 - 74)  BP: 109/51 (13 Dec 2019 11:15) (109/51 - 148/77)  BP(mean): --  RR: 17 (13 Dec 2019 11:15) (17 - 18)  SpO2: 99% (13 Dec 2019 11:55) (98% - 100%)    PHYSICAL EXAM:  GEN:         Awake, responsive and comfortable.  HEENT:    Normal.    RESP:        no wheezing.  CVS:          Regular rate and rhythm.   ABD:         Soft, non-tender, non-distended;     MEDICATIONS  (STANDING):  allopurinol 100 milliGRAM(s) Oral two times a day  digoxin     Tablet 0.125 milliGRAM(s) Oral daily  furosemide    Tablet 40 milliGRAM(s) Oral daily  heparin  Infusion. 1200 Unit(s)/Hr (12 mL/Hr) IV Continuous <Continuous>  ipratropium    for Nebulization 500 MICROGram(s) Nebulizer every 6 hours  metoprolol succinate ER 25 milliGRAM(s) Oral daily  pantoprazole  Injectable 40 milliGRAM(s) IV Push two times a day  potassium chloride    Tablet ER 20 milliEquivalent(s) Oral daily  simvastatin 5 milliGRAM(s) Oral at bedtime  warfarin 7.5 milliGRAM(s) Oral once    MEDICATIONS  (PRN):  acetaminophen   Tablet .. 650 milliGRAM(s) Oral every 6 hours PRN Mild Pain (1 - 3)  heparin  Injectable 9000 Unit(s) IV Push every 6 hours PRN For aPTT less than 40  heparin  Injectable 4000 Unit(s) IV Push every 6 hours PRN For aPTT between 40 - 57  sodium chloride 0.65% Nasal 1 Spray(s) Both Nostrils four times a day PRN Nasal Congestion    LABS:                        9.2    7.20  )-----------( 309      ( 13 Dec 2019 06:30 )             31.7   PT/INR - ( 13 Dec 2019 06:30 )   PT: 13.3 sec;   INR: 1.18 ratio      PTT - ( 13 Dec 2019 06:30 )  PTT:150.0 sec     ASSESSMENT AND PLAN:  ·	SOB, multifactorial.  ·	Chronic hypoxic hypercarbic Respiratory failure.  ·	CHF by history.  ·	pHTN.  ·	Atrial Fibrillation.  ·	S/P Mitral and Tricuspid Valve replacement.  ·	Severe Anemia.  ·	Suspect GI bleed.  ·	Renal Insuffiencey.  ·	Obesity.  ·	MIGUEL, did not tolerate CPAP.    EGD not done as with stable H & H, and high risk for procedure.  Continue O2, diuretics and nebulizer.  On Coumadin bridge , continue IV heparin.

## 2019-12-13 NOTE — PROGRESS NOTE ADULT - ASSESSMENT
IMPROVE VTE Individual Risk Assessment    RISK                                                                Points    [  ] Previous VTE                                                  3    [  ] Thrombophilia                                               2    [  ] Lower limb paralysis                                      2        (unable to hold up >15 seconds)      [  ] Current Cancer                                              2         (within 6 months)    [x  ] Immobilization > 24 hrs                                1    [  ] ICU/CCU stay > 24 hours                              1    [x  ] Age > 60                                                      1    IMPROVE VTE Score _2________    IMPROVE Score 0-1: Low Risk, No VTE prophylaxis required for most patients, encourage ambulation.   IMPROVE Score 2-3: At risk, pharmacologic VTE prophylaxis is indicated for most patients (in the absence of a contraindication)  IMPROVE Score > or = 4: High Risk, pharmacologic VTE prophylaxis is indicated for most patients (in the absence of a contraindication)    No Phar. A/C 2* GIB  11/28/19 : Asymptomatic at rest. Receiving blood transfusion. Obese. Clear lungs. RSR. GI consult.  11/29/19 : No overt bleeding. repeat Hb pending. Spoke to the cardiologist, will wait for INR to normalize before clearing for EGD if needed.  11/30/19 : Hb 8.0 No overt bleeding. INR therapeutic. Follow CBC. Pt. asymptomatic  12/01//19 : Mild SOB. Hb. trending down, transfuse 1 unit PRBC. INR 2.40. Continue monitoring Hb.  12/02/19 : Asymptomatic. S/P Transfusion. Monitor CBC. Hb 9.4.  12/03/19 : Still has dark stools. Hb. dropped to 8.5 . Spoke to Dr. Manning, may transfer to [facility].  12/04/19 : Pt. alert. Melanotic stool present. Hb dropped again. Cardiology consult noted, cleared for EGD. GI f/u.  12/05/19 : Alert, asymptomatic. Follow CBC. BUN improved. GI F/U.  12/06/19 : EGD post poned 2* Strerilization problems. Will need A/C for bridging. Discussed with Dr. Manning, want to start on IV Heparin.  monitor cbc.  12/07/19 : Awake, asymptomatic. On Fuul heparinization. Hb 7.9. Monitor CBC.   12/08/19 : On IV heparin, Hb 7.7, will transfuse . GI f/u.  12/09/19 : S/P Transfusion. FOR EGD today. Heparin held.  12/10/19 : EGD could not be done yesterday. Scheduled for today. Heparin held. Hb stable.  12/11/19 : No EGD planned. As per Anesthesia, pt. is high risk. Discussed with Dr. Manning. Restart on coumadin. May need to transfer to [place] if bleeds again.  12/12/19 : Pt. stable. No further bleeding. On coumadin with Heparin bridging. INR subtherapeutic. Continue Heparin.  12/13/19 : No bleeding. Hb. stable, 9.2. Continue Coumadin  and Heparin bridging.

## 2019-12-14 LAB
APTT BLD: 82.4 SEC — HIGH (ref 27.5–36.3)
HCT VFR BLD CALC: 30.9 % — LOW (ref 34.5–45)
HGB BLD-MCNC: 9 G/DL — LOW (ref 11.5–15.5)
INR BLD: 1.27 RATIO — HIGH (ref 0.88–1.16)
MCHC RBC-ENTMCNC: 28.2 PG — SIGNIFICANT CHANGE UP (ref 27–34)
MCHC RBC-ENTMCNC: 29.1 GM/DL — LOW (ref 32–36)
MCV RBC AUTO: 96.9 FL — SIGNIFICANT CHANGE UP (ref 80–100)
NRBC # BLD: 0 /100 WBCS — SIGNIFICANT CHANGE UP (ref 0–0)
PLATELET # BLD AUTO: 292 K/UL — SIGNIFICANT CHANGE UP (ref 150–400)
PROTHROM AB SERPL-ACNC: 14.3 SEC — HIGH (ref 10–12.9)
RBC # BLD: 3.19 M/UL — LOW (ref 3.8–5.2)
RBC # FLD: 14.4 % — SIGNIFICANT CHANGE UP (ref 10.3–14.5)
WBC # BLD: 7.17 K/UL — SIGNIFICANT CHANGE UP (ref 3.8–10.5)
WBC # FLD AUTO: 7.17 K/UL — SIGNIFICANT CHANGE UP (ref 3.8–10.5)

## 2019-12-14 RX ORDER — WARFARIN SODIUM 2.5 MG/1
10 TABLET ORAL ONCE
Refills: 0 | Status: COMPLETED | OUTPATIENT
Start: 2019-12-14 | End: 2019-12-14

## 2019-12-14 RX ADMIN — Medication 500 MICROGRAM(S): at 17:22

## 2019-12-14 RX ADMIN — Medication 500 MICROGRAM(S): at 00:08

## 2019-12-14 RX ADMIN — Medication 100 MILLIGRAM(S): at 05:44

## 2019-12-14 RX ADMIN — Medication 500 MICROGRAM(S): at 05:23

## 2019-12-14 RX ADMIN — HEPARIN SODIUM 1100 UNIT(S)/HR: 5000 INJECTION INTRAVENOUS; SUBCUTANEOUS at 01:23

## 2019-12-14 RX ADMIN — WARFARIN SODIUM 10 MILLIGRAM(S): 2.5 TABLET ORAL at 21:37

## 2019-12-14 RX ADMIN — SIMVASTATIN 5 MILLIGRAM(S): 20 TABLET, FILM COATED ORAL at 21:33

## 2019-12-14 RX ADMIN — Medication 40 MILLIGRAM(S): at 05:44

## 2019-12-14 RX ADMIN — PANTOPRAZOLE SODIUM 40 MILLIGRAM(S): 20 TABLET, DELAYED RELEASE ORAL at 05:44

## 2019-12-14 RX ADMIN — Medication 500 MICROGRAM(S): at 11:36

## 2019-12-14 RX ADMIN — Medication 500 MICROGRAM(S): at 23:51

## 2019-12-14 RX ADMIN — Medication 20 MILLIEQUIVALENT(S): at 11:21

## 2019-12-14 RX ADMIN — Medication 25 MILLIGRAM(S): at 05:44

## 2019-12-14 RX ADMIN — Medication 100 MILLIGRAM(S): at 17:22

## 2019-12-14 RX ADMIN — PANTOPRAZOLE SODIUM 40 MILLIGRAM(S): 20 TABLET, DELAYED RELEASE ORAL at 17:22

## 2019-12-14 RX ADMIN — Medication 0.12 MILLIGRAM(S): at 05:44

## 2019-12-14 NOTE — PROGRESS NOTE ADULT - SUBJECTIVE AND OBJECTIVE BOX
Gastroenterology  Patient seen and examined bedside resting comfortably.  No complaints offered. NO Abdominal pain  Denies nausea and vomiting. Tolerating diet.  Normal flatus/BM. NO Melena    T(F): 97.5 (12-14-19 @ 11:28), Max: 98.2 (12-13-19 @ 23:30)  HR: 68 (12-14-19 @ 11:44) (60 - 74)  BP: 127/75 (12-14-19 @ 11:28) (124/75 - 151/79)  RR: 18 (12-14-19 @ 11:28) (18 - 18)  SpO2: 96% (12-14-19 @ 11:44) (93% - 100%)  Wt(kg): --  CAPILLARY BLOOD GLUCOSE          PHYSICAL EXAM:  General: NAD, WDWN.   Neuro:  Alert & responsive  HEENT: NCAT, EOMI, conjunctiva clear  CV: +S1+S2 regular rate and rhythm  Lung: clear to ausculation bilaterally, respirations nonlabored, good inspiratory effort  Abdomen: soft, Non tender, No Distension. Normal active BS  Extremities: no pedal edema or calf tenderness noted     LABS:                        9.0    7.17  )-----------( 292      ( 14 Dec 2019 07:51 )             30.9       PT/INR - ( 14 Dec 2019 09:29 )   PT: 14.3 sec;   INR: 1.27 ratio         PTT - ( 14 Dec 2019 00:26 )  PTT:82.4 sec  I&O's Detail    13 Dec 2019 07:01  -  14 Dec 2019 07:00  --------------------------------------------------------  IN:    heparin  Infusion.: 132 mL    Oral Fluid: 150 mL  Total IN: 282 mL    OUT:  Total OUT: 0 mL    Total NET: 282 mL

## 2019-12-14 NOTE — PROGRESS NOTE ADULT - SUBJECTIVE AND OBJECTIVE BOX
INTERVAL HPI:  69 year female with HTN, A fib, Mitral & Tricuspid valve replacement, CHF, COPD(never smoked) on home O2, Obesity, MIGUEL(did not tolerate CPAP), pHTN, Gout  Brought by EMS c/o progressively sob,  dizziness for two weeks,  unable to walk about 1/2 block. Increases  sob to laid down and decreases to sit up. Denies cough, chest pain, nausea, vomiting, fever, chills, abd. Also c/o black stool in the past one to two weeks, stopped taking iron pills three weeks ago.  Never smoked.    OVERNIGHT EVENTS:  Comfortable.    Vital Signs Last 24 Hrs  T(C): 36.7 (14 Dec 2019 18:12), Max: 36.8 (13 Dec 2019 23:30)  T(F): 98 (14 Dec 2019 18:12), Max: 98.2 (13 Dec 2019 23:30)  HR: 66 (14 Dec 2019 18:12) (62 - 74)  BP: 126/65 (14 Dec 2019 18:12) (124/75 - 133/61)  BP(mean): --  RR: 18 (14 Dec 2019 18:12) (18 - 18)  SpO2: 99% (14 Dec 2019 18:12) (96% - 100%)    PHYSICAL EXAM:  GEN:         Awake, responsive and comfortable.  HEENT:    Normal.    RESP:       no distress  CVS:             Regular rate and rhythm.   ABD:         Soft, non-tender, non-distended;     MEDICATIONS  (STANDING):  allopurinol 100 milliGRAM(s) Oral two times a day  digoxin     Tablet 0.125 milliGRAM(s) Oral daily  furosemide    Tablet 40 milliGRAM(s) Oral daily  heparin  Infusion. 1200 Unit(s)/Hr (12 mL/Hr) IV Continuous <Continuous>  ipratropium    for Nebulization 500 MICROGram(s) Nebulizer every 6 hours  metoprolol succinate ER 25 milliGRAM(s) Oral daily  pantoprazole  Injectable 40 milliGRAM(s) IV Push two times a day  potassium chloride    Tablet ER 20 milliEquivalent(s) Oral daily  simvastatin 5 milliGRAM(s) Oral at bedtime  warfarin 10 milliGRAM(s) Oral once    MEDICATIONS  (PRN):  acetaminophen   Tablet .. 650 milliGRAM(s) Oral every 6 hours PRN Mild Pain (1 - 3)  heparin  Injectable 9000 Unit(s) IV Push every 6 hours PRN For aPTT less than 40  heparin  Injectable 4000 Unit(s) IV Push every 6 hours PRN For aPTT between 40 - 57  sodium chloride 0.65% Nasal 1 Spray(s) Both Nostrils four times a day PRN Nasal Congestion    LABS:                        9.0    7.17  )-----------( 292      ( 14 Dec 2019 07:51 )             30.9   PT/INR - ( 14 Dec 2019 09:29 )   PT: 14.3 sec;   INR: 1.27 ratio      PTT - ( 14 Dec 2019 00:26 )  PTT:82.4 sec     ASSESSMENT AND PLAN:  ·	SOB, multifactorial.  ·	Chronic hypoxic hypercarbic Respiratory failure.  ·	CHF by history.  ·	pHTN.  ·	Atrial Fibrillation.  ·	S/P Mitral and Tricuspid Valve replacement.  ·	Severe Anemia.  ·	Suspect GI bleed.  ·	Renal Insuffiencey.  ·	Obesity.  ·	MIGUEL, did not tolerate CPAP.    Continue Lasix and nebulizer.  On home O2.  Did not tolerate CPAP.  Follow H & H.

## 2019-12-14 NOTE — PROGRESS NOTE ADULT - ASSESSMENT
IMPROVE VTE Individual Risk Assessment    RISK                                                                Points    [  ] Previous VTE                                                  3    [  ] Thrombophilia                                               2    [  ] Lower limb paralysis                                      2        (unable to hold up >15 seconds)      [  ] Current Cancer                                              2         (within 6 months)    [x  ] Immobilization > 24 hrs                                1    [  ] ICU/CCU stay > 24 hours                              1    [x  ] Age > 60                                                      1    IMPROVE VTE Score _2________    IMPROVE Score 0-1: Low Risk, No VTE prophylaxis required for most patients, encourage ambulation.   IMPROVE Score 2-3: At risk, pharmacologic VTE prophylaxis is indicated for most patients (in the absence of a contraindication)  IMPROVE Score > or = 4: High Risk, pharmacologic VTE prophylaxis is indicated for most patients (in the absence of a contraindication)    No Phar. A/C 2* GIB  11/28/19 : Asymptomatic at rest. Receiving blood transfusion. Obese. Clear lungs. RSR. GI consult.  11/29/19 : No overt bleeding. repeat Hb pending. Spoke to the cardiologist, will wait for INR to normalize before clearing for EGD if needed.  11/30/19 : Hb 8.0 No overt bleeding. INR therapeutic. Follow CBC. Pt. asymptomatic  12/01//19 : Mild SOB. Hb. trending down, transfuse 1 unit PRBC. INR 2.40. Continue monitoring Hb.  12/02/19 : Asymptomatic. S/P Transfusion. Monitor CBC. Hb 9.4.  12/03/19 : Still has dark stools. Hb. dropped to 8.5 . Spoke to Dr. Manning, may transfer to [facility].  12/04/19 : Pt. alert. Melanotic stool present. Hb dropped again. Cardiology consult noted, cleared for EGD. GI f/u.  12/05/19 : Alert, asymptomatic. Follow CBC. BUN improved. GI F/U.  12/06/19 : EGD post poned 2* Strerilization problems. Will need A/C for bridging. Discussed with Dr. Manning, want to start on IV Heparin.  monitor cbc.  12/07/19 : Awake, asymptomatic. On Fuul heparinization. Hb 7.9. Monitor CBC.   12/08/19 : On IV heparin, Hb 7.7, will transfuse . GI f/u.  12/09/19 : S/P Transfusion. FOR EGD today. Heparin held.  12/10/19 : EGD could not be done yesterday. Scheduled for today. Heparin held. Hb stable.  12/11/19 : No EGD planned. As per Anesthesia, pt. is high risk. Discussed with Dr. Manning. Restart on coumadin. May need to transfer to [place] if bleeds again.  12/12/19 : Pt. stable. No further bleeding. On coumadin with Heparin bridging. INR subtherapeutic. Continue Heparin.  12/13/19 : No bleeding. Hb. stable, 9.2. Continue Coumadin  and Heparin bridging.  12/14/19  : No bleeding. Continue Coumadin and Heparin.

## 2019-12-14 NOTE — PROGRESS NOTE ADULT - SUBJECTIVE AND OBJECTIVE BOX
Patient is a 69y old  Female who presents with a chief complaint of Lethargy, SOB (13 Dec 2019 12:26)      INTERVAL HPI/OVERNIGHT EVENTS:    MEDICATIONS  (STANDING):  allopurinol 100 milliGRAM(s) Oral two times a day  digoxin     Tablet 0.125 milliGRAM(s) Oral daily  furosemide    Tablet 40 milliGRAM(s) Oral daily  heparin  Infusion. 1200 Unit(s)/Hr (12 mL/Hr) IV Continuous <Continuous>  ipratropium    for Nebulization 500 MICROGram(s) Nebulizer every 6 hours  metoprolol succinate ER 25 milliGRAM(s) Oral daily  pantoprazole  Injectable 40 milliGRAM(s) IV Push two times a day  potassium chloride    Tablet ER 20 milliEquivalent(s) Oral daily  simvastatin 5 milliGRAM(s) Oral at bedtime  warfarin 10 milliGRAM(s) Oral once    MEDICATIONS  (PRN):  acetaminophen   Tablet .. 650 milliGRAM(s) Oral every 6 hours PRN Mild Pain (1 - 3)  heparin  Injectable 9000 Unit(s) IV Push every 6 hours PRN For aPTT less than 40  heparin  Injectable 4000 Unit(s) IV Push every 6 hours PRN For aPTT between 40 - 57  sodium chloride 0.65% Nasal 1 Spray(s) Both Nostrils four times a day PRN Nasal Congestion      Allergies    No Known Allergies    Intolerances        REVIEW OF SYSTEMS:  CONSTITUTIONAL: No fever, weight loss, or fatigue  EYES: No eye pain, visual disturbances, or discharge  ENMT:  No difficulty hearing, tinnitus, vertigo; No sinus or throat pain  NECK: No pain or stiffness  BREASTS: No pain, masses, or nipple discharge  RESPIRATORY: No cough, wheezing, chills or hemoptysis; No shortness of breath  CARDIOVASCULAR: No chest pain, palpitations, dizziness, or leg swelling  GASTROINTESTINAL: No abdominal or epigastric pain. No nausea, vomiting, or hematemesis; No diarrhea or constipation. No melena or hematochezia.  GENITOURINARY: No dysuria, frequency, hematuria, or incontinence  NEUROLOGICAL: No headaches, memory loss, loss of strength, numbness, or tremors  SKIN: No itching, burning, rashes, or lesions   LYMPH NODES: No enlarged glands  ENDOCRINE: No heat or cold intolerance; No hair loss  MUSCULOSKELETAL: No joint pain or swelling; No muscle, back, or extremity pain  PSYCHIATRIC: No depression, anxiety, mood swings, or difficulty sleeping  HEME/LYMPH: No easy bruising, or bleeding gums  ALLERGY AND IMMUNOLOGIC: No hives or eczema    Vital Signs Last 24 Hrs  T(C): 36.8 (14 Dec 2019 04:55), Max: 36.8 (13 Dec 2019 23:30)  T(F): 98.2 (14 Dec 2019 04:55), Max: 98.2 (13 Dec 2019 23:30)  HR: 74 (14 Dec 2019 09:51) (60 - 74)  BP: 124/75 (14 Dec 2019 09:51) (109/51 - 151/79)  BP(mean): --  RR: 18 (14 Dec 2019 04:55) (17 - 18)  SpO2: 99% (14 Dec 2019 09:51) (93% - 100%)    PHYSICAL EXAM:  GENERAL: NAD, well-groomed, well-developed  HEAD:  Atraumatic, Normocephalic  EYES: EOMI, PERRL, conjunctiva and sclera clear  ENMT:  Moist mucous membranes, Good dentition, No lesions  NECK: Supple, No JVD, Normal thyroid  NERVOUS SYSTEM:  Alert & Oriented X3, Good concentration; Motor Strength 5/5 B/L upper and lower extremities; DTRs 2+ intact and symmetric  CHEST/LUNG: Clear to percussion bilaterally; No rales, rhonchi, wheezing, or rubs  HEART: Regular rate and rhythm; No murmurs, rubs, or gallops  ABDOMEN: Soft, Nontender, Nondistended; Bowel sounds present  EXTREMITIES:  2+ Peripheral Pulses, No clubbing, cyanosis, or edema  LYMPH: No lymphadenopathy noted  SKIN: No rashes or lesions    LABS:                        9.0    7.17  )-----------( 292      ( 14 Dec 2019 07:51 )             30.9           PT/INR - ( 14 Dec 2019 09:29 )   PT: 14.3 sec;   INR: 1.27 ratio         PTT - ( 14 Dec 2019 00:26 )  PTT:82.4 sec    CAPILLARY BLOOD GLUCOSE                        RADIOLOGY & ADDITIONAL TESTS:    Imaging Personally Reviewed:  [ ] YES  [ ] NO    Consultant(s) Notes Reviewed:  [ ] YES  [ ] NO    Care Discussed with Consultants/Other Providers [ ] YES  [ ] NO    PROBLEMS:  GI BLEED  SHORTNESS OF BREATH  GI bleed  Atrial fibrillation, unspecified type  Gastrointestinal hemorrhage with melena  Mitral valve replaced  Longstanding persistent atrial fibrillation  Pulmonary hypertension  MIGUEL (obstructive sleep apnea)  Chronic obstructive pulmonary disease, unspecified COPD type  Gout  Essential hypertension  Gastrointestinal hemorrhage associated with anorectal source      Care discussed with family,         [  ]   yes  [  ]  No    imp:    stable[ ]    unstable[  ]     improving [   ]       unchanged  [  ]                Plans:  Continue present plans  [  ]               New consult [  ]   specialty  .......               order maribell[  ]    test name.                  Discharge Planning  [  ]

## 2019-12-15 LAB
APTT BLD: 81 SEC — HIGH (ref 27.5–36.3)
HCT VFR BLD CALC: 31.5 % — LOW (ref 34.5–45)
HGB BLD-MCNC: 9.1 G/DL — LOW (ref 11.5–15.5)
INR BLD: 1.31 RATIO — HIGH (ref 0.88–1.16)
MCHC RBC-ENTMCNC: 28 PG — SIGNIFICANT CHANGE UP (ref 27–34)
MCHC RBC-ENTMCNC: 28.9 GM/DL — LOW (ref 32–36)
MCV RBC AUTO: 96.9 FL — SIGNIFICANT CHANGE UP (ref 80–100)
NRBC # BLD: 0 /100 WBCS — SIGNIFICANT CHANGE UP (ref 0–0)
PLATELET # BLD AUTO: 299 K/UL — SIGNIFICANT CHANGE UP (ref 150–400)
PROTHROM AB SERPL-ACNC: 14.8 SEC — HIGH (ref 10–12.9)
RBC # BLD: 3.25 M/UL — LOW (ref 3.8–5.2)
RBC # FLD: 14.5 % — SIGNIFICANT CHANGE UP (ref 10.3–14.5)
WBC # BLD: 7.37 K/UL — SIGNIFICANT CHANGE UP (ref 3.8–10.5)
WBC # FLD AUTO: 7.37 K/UL — SIGNIFICANT CHANGE UP (ref 3.8–10.5)

## 2019-12-15 RX ORDER — WARFARIN SODIUM 2.5 MG/1
10 TABLET ORAL ONCE
Refills: 0 | Status: COMPLETED | OUTPATIENT
Start: 2019-12-15 | End: 2019-12-15

## 2019-12-15 RX ADMIN — Medication 25 MILLIGRAM(S): at 05:37

## 2019-12-15 RX ADMIN — PANTOPRAZOLE SODIUM 40 MILLIGRAM(S): 20 TABLET, DELAYED RELEASE ORAL at 17:31

## 2019-12-15 RX ADMIN — Medication 100 MILLIGRAM(S): at 17:31

## 2019-12-15 RX ADMIN — Medication 500 MICROGRAM(S): at 17:02

## 2019-12-15 RX ADMIN — Medication 500 MICROGRAM(S): at 06:10

## 2019-12-15 RX ADMIN — HEPARIN SODIUM 1100 UNIT(S)/HR: 5000 INJECTION INTRAVENOUS; SUBCUTANEOUS at 08:02

## 2019-12-15 RX ADMIN — Medication 0.12 MILLIGRAM(S): at 05:37

## 2019-12-15 RX ADMIN — Medication 100 MILLIGRAM(S): at 05:37

## 2019-12-15 RX ADMIN — PANTOPRAZOLE SODIUM 40 MILLIGRAM(S): 20 TABLET, DELAYED RELEASE ORAL at 05:37

## 2019-12-15 RX ADMIN — Medication 40 MILLIGRAM(S): at 05:37

## 2019-12-15 RX ADMIN — Medication 20 MILLIEQUIVALENT(S): at 11:14

## 2019-12-15 RX ADMIN — Medication 500 MICROGRAM(S): at 11:01

## 2019-12-15 RX ADMIN — WARFARIN SODIUM 10 MILLIGRAM(S): 2.5 TABLET ORAL at 22:02

## 2019-12-15 RX ADMIN — SIMVASTATIN 5 MILLIGRAM(S): 20 TABLET, FILM COATED ORAL at 22:02

## 2019-12-15 NOTE — PROGRESS NOTE ADULT - SUBJECTIVE AND OBJECTIVE BOX
Patient is a 69y old  Female who presents with a chief complaint of Lethargy, SOB (14 Dec 2019 21:29)      INTERVAL HPI/OVERNIGHT EVENTS:    MEDICATIONS  (STANDING):  allopurinol 100 milliGRAM(s) Oral two times a day  digoxin     Tablet 0.125 milliGRAM(s) Oral daily  furosemide    Tablet 40 milliGRAM(s) Oral daily  heparin  Infusion. 1200 Unit(s)/Hr (12 mL/Hr) IV Continuous <Continuous>  ipratropium    for Nebulization 500 MICROGram(s) Nebulizer every 6 hours  metoprolol succinate ER 25 milliGRAM(s) Oral daily  pantoprazole  Injectable 40 milliGRAM(s) IV Push two times a day  potassium chloride    Tablet ER 20 milliEquivalent(s) Oral daily  simvastatin 5 milliGRAM(s) Oral at bedtime  warfarin 10 milliGRAM(s) Oral once    MEDICATIONS  (PRN):  acetaminophen   Tablet .. 650 milliGRAM(s) Oral every 6 hours PRN Mild Pain (1 - 3)  heparin  Injectable 9000 Unit(s) IV Push every 6 hours PRN For aPTT less than 40  heparin  Injectable 4000 Unit(s) IV Push every 6 hours PRN For aPTT between 40 - 57  sodium chloride 0.65% Nasal 1 Spray(s) Both Nostrils four times a day PRN Nasal Congestion      Allergies    No Known Allergies    Intolerances        REVIEW OF SYSTEMS:  CONSTITUTIONAL: No fever, weight loss, or fatigue  EYES: No eye pain, visual disturbances, or discharge  ENMT:  No difficulty hearing, tinnitus, vertigo; No sinus or throat pain  NECK: No pain or stiffness  BREASTS: No pain, masses, or nipple discharge  RESPIRATORY: No cough, wheezing, chills or hemoptysis; No shortness of breath  CARDIOVASCULAR: No chest pain, palpitations, dizziness, or leg swelling  GASTROINTESTINAL: No abdominal or epigastric pain. No nausea, vomiting, or hematemesis; No diarrhea or constipation. No melena or hematochezia.  GENITOURINARY: No dysuria, frequency, hematuria, or incontinence  NEUROLOGICAL: No headaches, memory loss, loss of strength, numbness, or tremors  SKIN: No itching, burning, rashes, or lesions   LYMPH NODES: No enlarged glands  ENDOCRINE: No heat or cold intolerance; No hair loss  MUSCULOSKELETAL: No joint pain or swelling; No muscle, back, or extremity pain  PSYCHIATRIC: No depression, anxiety, mood swings, or difficulty sleeping  HEME/LYMPH: No easy bruising, or bleeding gums  ALLERGY AND IMMUNOLOGIC: No hives or eczema    Vital Signs Last 24 Hrs  T(C): 36.4 (15 Dec 2019 05:57), Max: 37.1 (14 Dec 2019 23:49)  T(F): 97.5 (15 Dec 2019 05:57), Max: 98.8 (14 Dec 2019 23:49)  HR: 60 (15 Dec 2019 06:10) (59 - 74)  BP: 130/66 (15 Dec 2019 05:57) (124/75 - 130/66)  BP(mean): --  RR: 18 (15 Dec 2019 05:57) (18 - 18)  SpO2: 98% (15 Dec 2019 06:10) (96% - 100%)    PHYSICAL EXAM:  GENERAL: NAD, well-groomed, well-developed. Obese  HEAD:  Atraumatic, Normocephalic  EYES: EOMI, PERRL, conjunctiva and sclera clear  ENMT:  Moist mucous membranes, Good dentition, No lesions  NECK: Supple, No JVD, Normal thyroid  NERVOUS SYSTEM:  Alert & Oriented X3, Good concentration; Motor Strength 5/5 B/L upper and lower extremities; DTRs 2+ intact and symmetric  CHEST/LUNG: Clear to percussion bilaterally; No rales, rhonchi, wheezing, or rubs  HEART: A-Fib. Systolic murmurs, rubs, or gallops  ABDOMEN: Soft, Nontender, Nondistended; Bowel sounds present  EXTREMITIES:  2+ Peripheral Pulses, No clubbing, cyanosis, or edema  LYMPH: No lymphadenopathy noted  SKIN: No rashes or lesions    LABS:                        9.1    7.37  )-----------( 299      ( 15 Dec 2019 07:44 )             31.5           PT/INR - ( 15 Dec 2019 07:44 )   PT: 14.8 sec;   INR: 1.31 ratio         PTT - ( 15 Dec 2019 07:44 )  PTT:81.0 sec    CAPILLARY BLOOD GLUCOSE          RADIOLOGY & ADDITIONAL TESTS:    Imaging Personally Reviewed:  [ ] YES  [ ] NO    Consultant(s) Notes Reviewed:  [ ] YES  [ ] NO    Care Discussed with Consultants/Other Providers [ ] YES  [ ] NO    PROBLEMS:  GI BLEED  SHORTNESS OF BREATH  GI bleed  Atrial fibrillation, unspecified type  Gastrointestinal hemorrhage with melena  Mitral valve replaced  Longstanding persistent atrial fibrillation  Pulmonary hypertension  MIGUEL (obstructive sleep apnea)  Chronic obstructive pulmonary disease, unspecified COPD type  Gout  Essential hypertension  Gastrointestinal hemorrhage associated with anorectal source      Care discussed with family,         [  ]   yes  [  ]  No    imp:    stable[ ]    unstable[  ]     improving [   ]       unchanged  [  ]                Plans:  Continue present plans  [  ]               New consult [  ]   specialty  .......               order maribell[  ]    test name.                  Discharge Planning  [  ]

## 2019-12-15 NOTE — PROGRESS NOTE ADULT - SUBJECTIVE AND OBJECTIVE BOX
INTERVAL HPI:   69 year female with HTN, A fib, Mitral & Tricuspid valve replacement, CHF, COPD(never smoked) on home O2, Obesity, MIGUEL(did not tolerate CPAP), pHTN, Gout  Brought by EMS c/o progressively sob,  dizziness for two weeks,  unable to walk about 1/2 block. Increases  sob to laid down and decreases to sit up. Denies cough, chest pain, nausea, vomiting, fever, chills, abd. Also c/o black stool in the past one to two weeks, stopped taking iron pills three weeks ago.  Never smoked.    OVERNIGHT EVENTS:  Comfortable in chair.    Vital Signs Last 24 Hrs  T(C): 37.2 (15 Dec 2019 17:18), Max: 37.2 (15 Dec 2019 17:18)  T(F): 99 (15 Dec 2019 17:18), Max: 99 (15 Dec 2019 17:18)  HR: 62 (15 Dec 2019 18:03) (59 - 82)  BP: 135/71 (15 Dec 2019 17:18) (114/51 - 135/71)  BP(mean): --  RR: 19 (15 Dec 2019 17:18) (18 - 19)  SpO2: 98% (15 Dec 2019 18:03) (96% - 100%)    PHYSICAL EXAM:  GEN:         Awake, responsive and comfortable.  HEENT:    Normal.    RESP:        no wheezing.  CVS:          Regular rate and rhythm.   ABD:         Soft, non-tender, non-distended;     MEDICATIONS  (STANDING):  allopurinol 100 milliGRAM(s) Oral two times a day  digoxin     Tablet 0.125 milliGRAM(s) Oral daily  furosemide    Tablet 40 milliGRAM(s) Oral daily  heparin  Infusion. 1200 Unit(s)/Hr (12 mL/Hr) IV Continuous <Continuous>  ipratropium    for Nebulization 500 MICROGram(s) Nebulizer every 6 hours  metoprolol succinate ER 25 milliGRAM(s) Oral daily  pantoprazole  Injectable 40 milliGRAM(s) IV Push two times a day  potassium chloride    Tablet ER 20 milliEquivalent(s) Oral daily  simvastatin 5 milliGRAM(s) Oral at bedtime  warfarin 10 milliGRAM(s) Oral once    MEDICATIONS  (PRN):  acetaminophen   Tablet .. 650 milliGRAM(s) Oral every 6 hours PRN Mild Pain (1 - 3)  heparin  Injectable 9000 Unit(s) IV Push every 6 hours PRN For aPTT less than 40  heparin  Injectable 4000 Unit(s) IV Push every 6 hours PRN For aPTT between 40 - 57  sodium chloride 0.65% Nasal 1 Spray(s) Both Nostrils four times a day PRN Nasal Congestion    LABS:                        9.1    7.37  )-----------( 299      ( 15 Dec 2019 07:44 )             31.5     PT/INR - ( 15 Dec 2019 07:44 )   PT: 14.8 sec;   INR: 1.31 ratio       PTT - ( 15 Dec 2019 07:44 )  PTT:81.0 sec     ASSESSMENT AND PLAN:  ·	SOB, multifactorial.  ·	Chronic hypoxic hypercarbic Respiratory failure.  ·	CHF by history.  ·	pHTN.  ·	Atrial Fibrillation.  ·	S/P Mitral and Tricuspid Valve replacement.  ·	Severe Anemia.  ·	Suspect GI bleed.  ·	Renal Insuffiencey.  ·	Obesity.  ·	MIGUEL, did not tolerate CPAP.    On IV heparin with Coumadin bridge.  Continue Lasix and nebulizer.  On home O2.  Follow H & H.

## 2019-12-15 NOTE — PROGRESS NOTE ADULT - SUBJECTIVE AND OBJECTIVE BOX
Gastroenterology  Patient seen and examined bedside resting comfortably.  No complaints offered. NO Abdominal pain  Denies nausea and vomiting. Tolerating diet.  Normal flatus/BM. NO Melena    T(F): 98 (12-15-19 @ 11:31), Max: 98.8 (12-14-19 @ 23:49)  HR: 82 (12-15-19 @ 11:31) (59 - 82)  BP: 114/51 (12-15-19 @ 11:31) (114/51 - 130/66)  RR: 18 (12-15-19 @ 11:31) (18 - 18)  SpO2: 97% (12-15-19 @ 11:31) (96% - 100%)  Wt(kg): --  CAPILLARY BLOOD GLUCOSE    PHYSICAL EXAM:  General: NAD, WDWN.   Neuro:  Alert & responsive  HEENT: NCAT, EOMI, conjunctiva clear  CV: +S1+S2 regular rate and rhythm  Lung: clear to ausculation bilaterally, respirations nonlabored, good inspiratory effort  Abdomen: soft, Non tender, No Distension. Normal active BS  Extremities: no pedal edema or calf tenderness noted     LABS:                        9.1    7.37  )-----------( 299      ( 15 Dec 2019 07:44 )             31.5             PT/INR - ( 15 Dec 2019 07:44 )   PT: 14.8 sec;   INR: 1.31 ratio         PTT - ( 15 Dec 2019 07:44 )  PTT:81.0 sec  I&O's Detail    14 Dec 2019 07:01  -  15 Dec 2019 07:00  --------------------------------------------------------  IN:    Oral Fluid: 280 mL  Total IN: 280 mL    OUT:    Voided: 400 mL  Total OUT: 400 mL    Total NET: -120 mL

## 2019-12-15 NOTE — PROGRESS NOTE ADULT - ASSESSMENT
IMPROVE VTE Individual Risk Assessment    RISK                                                                Points    [  ] Previous VTE                                                  3    [  ] Thrombophilia                                               2    [  ] Lower limb paralysis                                      2        (unable to hold up >15 seconds)      [  ] Current Cancer                                              2         (within 6 months)    [x  ] Immobilization > 24 hrs                                1    [  ] ICU/CCU stay > 24 hours                              1    [x  ] Age > 60                                                      1    IMPROVE VTE Score _2________    IMPROVE Score 0-1: Low Risk, No VTE prophylaxis required for most patients, encourage ambulation.   IMPROVE Score 2-3: At risk, pharmacologic VTE prophylaxis is indicated for most patients (in the absence of a contraindication)  IMPROVE Score > or = 4: High Risk, pharmacologic VTE prophylaxis is indicated for most patients (in the absence of a contraindication)    No Phar. A/C 2* GIB  11/28/19 : Asymptomatic at rest. Receiving blood transfusion. Obese. Clear lungs. RSR. GI consult.  11/29/19 : No overt bleeding. repeat Hb pending. Spoke to the cardiologist, will wait for INR to normalize before clearing for EGD if needed.  11/30/19 : Hb 8.0 No overt bleeding. INR therapeutic. Follow CBC. Pt. asymptomatic  12/01//19 : Mild SOB. Hb. trending down, transfuse 1 unit PRBC. INR 2.40. Continue monitoring Hb.  12/02/19 : Asymptomatic. S/P Transfusion. Monitor CBC. Hb 9.4.  12/03/19 : Still has dark stools. Hb. dropped to 8.5 . Spoke to Dr. Manning, may transfer to [facility].  12/04/19 : Pt. alert. Melanotic stool present. Hb dropped again. Cardiology consult noted, cleared for EGD. GI f/u.  12/05/19 : Alert, asymptomatic. Follow CBC. BUN improved. GI F/U.  12/06/19 : EGD post poned 2* Strerilization problems. Will need A/C for bridging. Discussed with Dr. Manning, want to start on IV Heparin.  monitor cbc.  12/07/19 : Awake, asymptomatic. On Fuul heparinization. Hb 7.9. Monitor CBC.   12/08/19 : On IV heparin, Hb 7.7, will transfuse . GI f/u.  12/09/19 : S/P Transfusion. FOR EGD today. Heparin held.  12/10/19 : EGD could not be done yesterday. Scheduled for today. Heparin held. Hb stable.  12/11/19 : No EGD planned. As per Anesthesia, pt. is high risk. Discussed with Dr. Manning. Restart on coumadin. May need to transfer to [place] if bleeds again.  12/12/19 : Pt. stable. No further bleeding. On coumadin with Heparin bridging. INR subtherapeutic. Continue Heparin.  12/13/19 : No bleeding. Hb. stable, 9.2. Continue Coumadin  and Heparin bridging.  12/14/19  : No bleeding. Continue Coumadin and Heparin.  12/15/19 : No bleeding. On coumadin with Heparin bridge.

## 2019-12-16 LAB
APTT BLD: 93.1 SEC — HIGH (ref 27.5–36.3)
HCT VFR BLD CALC: 30.6 % — LOW (ref 34.5–45)
HGB BLD-MCNC: 9 G/DL — LOW (ref 11.5–15.5)
INR BLD: 1.6 RATIO — HIGH (ref 0.88–1.16)
MCHC RBC-ENTMCNC: 28.3 PG — SIGNIFICANT CHANGE UP (ref 27–34)
MCHC RBC-ENTMCNC: 29.4 GM/DL — LOW (ref 32–36)
MCV RBC AUTO: 96.2 FL — SIGNIFICANT CHANGE UP (ref 80–100)
NRBC # BLD: 0 /100 WBCS — SIGNIFICANT CHANGE UP (ref 0–0)
PLATELET # BLD AUTO: 317 K/UL — SIGNIFICANT CHANGE UP (ref 150–400)
PROTHROM AB SERPL-ACNC: 18.2 SEC — HIGH (ref 10–12.9)
RBC # BLD: 3.18 M/UL — LOW (ref 3.8–5.2)
RBC # FLD: 14.5 % — SIGNIFICANT CHANGE UP (ref 10.3–14.5)
WBC # BLD: 7.81 K/UL — SIGNIFICANT CHANGE UP (ref 3.8–10.5)
WBC # FLD AUTO: 7.81 K/UL — SIGNIFICANT CHANGE UP (ref 3.8–10.5)

## 2019-12-16 RX ORDER — PANTOPRAZOLE SODIUM 20 MG/1
40 TABLET, DELAYED RELEASE ORAL
Refills: 0 | Status: DISCONTINUED | OUTPATIENT
Start: 2019-12-16 | End: 2019-12-18

## 2019-12-16 RX ORDER — WARFARIN SODIUM 2.5 MG/1
7.5 TABLET ORAL ONCE
Refills: 0 | Status: COMPLETED | OUTPATIENT
Start: 2019-12-16 | End: 2019-12-16

## 2019-12-16 RX ADMIN — Medication 500 MICROGRAM(S): at 23:41

## 2019-12-16 RX ADMIN — PANTOPRAZOLE SODIUM 40 MILLIGRAM(S): 20 TABLET, DELAYED RELEASE ORAL at 18:25

## 2019-12-16 RX ADMIN — Medication 25 MILLIGRAM(S): at 06:08

## 2019-12-16 RX ADMIN — WARFARIN SODIUM 7.5 MILLIGRAM(S): 2.5 TABLET ORAL at 22:03

## 2019-12-16 RX ADMIN — Medication 100 MILLIGRAM(S): at 06:08

## 2019-12-16 RX ADMIN — Medication 100 MILLIGRAM(S): at 18:25

## 2019-12-16 RX ADMIN — SIMVASTATIN 5 MILLIGRAM(S): 20 TABLET, FILM COATED ORAL at 22:04

## 2019-12-16 RX ADMIN — HEPARIN SODIUM 1100 UNIT(S)/HR: 5000 INJECTION INTRAVENOUS; SUBCUTANEOUS at 07:27

## 2019-12-16 RX ADMIN — Medication 500 MICROGRAM(S): at 18:04

## 2019-12-16 RX ADMIN — Medication 500 MICROGRAM(S): at 06:32

## 2019-12-16 RX ADMIN — PANTOPRAZOLE SODIUM 40 MILLIGRAM(S): 20 TABLET, DELAYED RELEASE ORAL at 22:56

## 2019-12-16 RX ADMIN — Medication 20 MILLIEQUIVALENT(S): at 13:04

## 2019-12-16 RX ADMIN — Medication 40 MILLIGRAM(S): at 05:09

## 2019-12-16 RX ADMIN — Medication 0.12 MILLIGRAM(S): at 05:09

## 2019-12-16 RX ADMIN — Medication 500 MICROGRAM(S): at 00:11

## 2019-12-16 RX ADMIN — Medication 500 MICROGRAM(S): at 11:23

## 2019-12-16 RX ADMIN — PANTOPRAZOLE SODIUM 40 MILLIGRAM(S): 20 TABLET, DELAYED RELEASE ORAL at 05:09

## 2019-12-16 NOTE — PROGRESS NOTE ADULT - SUBJECTIVE AND OBJECTIVE BOX
Patient is a 69y old  Female who presents with a chief complaint of Lethargy, SOB (15 Dec 2019 21:25)      INTERVAL HPI/OVERNIGHT EVENTS:    MEDICATIONS  (STANDING):  allopurinol 100 milliGRAM(s) Oral two times a day  digoxin     Tablet 0.125 milliGRAM(s) Oral daily  furosemide    Tablet 40 milliGRAM(s) Oral daily  heparin  Infusion. 1200 Unit(s)/Hr (12 mL/Hr) IV Continuous <Continuous>  ipratropium    for Nebulization 500 MICROGram(s) Nebulizer every 6 hours  metoprolol succinate ER 25 milliGRAM(s) Oral daily  pantoprazole  Injectable 40 milliGRAM(s) IV Push two times a day  potassium chloride    Tablet ER 20 milliEquivalent(s) Oral daily  simvastatin 5 milliGRAM(s) Oral at bedtime  warfarin 7.5 milliGRAM(s) Oral once    MEDICATIONS  (PRN):  acetaminophen   Tablet .. 650 milliGRAM(s) Oral every 6 hours PRN Mild Pain (1 - 3)  heparin  Injectable 9000 Unit(s) IV Push every 6 hours PRN For aPTT less than 40  heparin  Injectable 4000 Unit(s) IV Push every 6 hours PRN For aPTT between 40 - 57  sodium chloride 0.65% Nasal 1 Spray(s) Both Nostrils four times a day PRN Nasal Congestion      Allergies    No Known Allergies    Intolerances        REVIEW OF SYSTEMS:  CONSTITUTIONAL: No fever, weight loss, or fatigue  EYES: No eye pain, visual disturbances, or discharge  ENMT:  No difficulty hearing, tinnitus, vertigo; No sinus or throat pain  NECK: No pain or stiffness  BREASTS: No pain, masses, or nipple discharge  RESPIRATORY: No cough, wheezing, chills or hemoptysis; No shortness of breath  CARDIOVASCULAR: No chest pain, palpitations, dizziness, or leg swelling  GASTROINTESTINAL: No abdominal or epigastric pain. No nausea, vomiting, or hematemesis; No diarrhea or constipation. No melena or hematochezia.  GENITOURINARY: No dysuria, frequency, hematuria, or incontinence  NEUROLOGICAL: No headaches, memory loss, loss of strength, numbness, or tremors  SKIN: No itching, burning, rashes, or lesions   LYMPH NODES: No enlarged glands  ENDOCRINE: No heat or cold intolerance; No hair loss  MUSCULOSKELETAL: No joint pain or swelling; No muscle, back, or extremity pain  PSYCHIATRIC: No depression, anxiety, mood swings, or difficulty sleeping  HEME/LYMPH: No easy bruising, or bleeding gums  ALLERGY AND IMMUNOLOGIC: No hives or eczema    Vital Signs Last 24 Hrs  T(C): 36.3 (16 Dec 2019 06:08), Max: 37.2 (15 Dec 2019 17:18)  T(F): 97.3 (16 Dec 2019 06:08), Max: 99 (15 Dec 2019 17:18)  HR: 61 (16 Dec 2019 06:37) (59 - 82)  BP: 138/75 (16 Dec 2019 06:08) (114/51 - 138/75)  BP(mean): --  RR: 18 (16 Dec 2019 06:08) (18 - 19)  SpO2: 95% (16 Dec 2019 06:37) (95% - 98%)    PHYSICAL EXAM:  GENERAL: NAD, well-groomed, well-developed, Obese  HEAD:  Atraumatic, Normocephalic  EYES: EOMI, PERRL, conjunctiva and sclera clear  ENMT:  Moist mucous membranes, Good dentition, No lesions  NECK: Supple, No JVD, Normal thyroid  NERVOUS SYSTEM:  Alert & Oriented X3, Good concentration; Motor Strength 5/5 B/L upper and lower extremities; DTRs 2+ intact and symmetric  CHEST/LUNG: Clear to percussion bilaterally; No rales, rhonchi, wheezing, or rubs  HEART: A-Fib; Systolic  murmur,no  rubs, or gallops  ABDOMEN: Soft, Nontender, Nondistended; Bowel sounds present  EXTREMITIES:  2+ Peripheral Pulses, No clubbing, cyanosis, or edema  LYMPH: No lymphadenopathy noted  SKIN: No rashes or lesions    LABS:                        9.0    7.81  )-----------( 317      ( 16 Dec 2019 06:23 )             30.6           PT/INR - ( 16 Dec 2019 06:23 )   PT: 18.2 sec;   INR: 1.60 ratio         PTT - ( 16 Dec 2019 06:23 )  PTT:93.1 sec    CAPILLARY BLOOD GLUCOSE                        RADIOLOGY & ADDITIONAL TESTS:    Imaging Personally Reviewed:  [ ] YES  [ ] NO    Consultant(s) Notes Reviewed:  [ ] YES  [ ] NO    Care Discussed with Consultants/Other Providers [ ] YES  [ ] NO    PROBLEMS:  GI BLEED  SHORTNESS OF BREATH  GI bleed  Atrial fibrillation, unspecified type  Gastrointestinal hemorrhage with melena  Mitral valve replaced  Longstanding persistent atrial fibrillation  Pulmonary hypertension  MIGUEL (obstructive sleep apnea)  Chronic obstructive pulmonary disease, unspecified COPD type  Gout  Essential hypertension  Gastrointestinal hemorrhage associated with anorectal source      Care discussed with family,         [  ]   yes  [  ]  No    imp:    stable[ ]    unstable[  ]     improving [   ]       unchanged  [  ]                Plans:  Continue present plans  [  ]               New consult [  ]   specialty  .......               order maribell[  ]    test name.                  Discharge Planning  [  ]

## 2019-12-16 NOTE — PROGRESS NOTE ADULT - SUBJECTIVE AND OBJECTIVE BOX
INTERVAL HPI:   69 year female with HTN, A fib, Mitral & Tricuspid valve replacement, CHF, COPD(never smoked) on home O2, Obesity, MIGUEL(did not tolerate CPAP), pHTN, Gout  Brought by EMS c/o progressively sob,  dizziness for two weeks,  unable to walk about 1/2 block. Increases  sob to laid down and decreases to sit up. Denies cough, chest pain, nausea, vomiting, fever, chills, abd. Also c/o black stool in the past one to two weeks, stopped taking iron pills three weeks ago.  Never smoked.    OVERNIGHT EVENTS:  Feels better.    Vital Signs Last 24 Hrs  T(C): 36.4 (16 Dec 2019 17:23), Max: 36.8 (15 Dec 2019 23:51)  T(F): 97.5 (16 Dec 2019 17:23), Max: 98.2 (15 Dec 2019 23:51)  HR: 66 (16 Dec 2019 17:23) (58 - 66)  BP: 142/69 (16 Dec 2019 17:23) (133/61 - 142/69)  BP(mean): --  RR: 18 (16 Dec 2019 17:23) (17 - 18)  SpO2: 96% (16 Dec 2019 17:23) (95% - 100%)    PHYSICAL EXAM:  GEN:         Awake, responsive and comfortable.  HEENT:    Normal.    RESP:        no wheezing.  CVS:          Regular rate and rhythm.   ABD:         Soft, non-tender, non-distended;     MEDICATIONS  (STANDING):  allopurinol 100 milliGRAM(s) Oral two times a day  digoxin     Tablet 0.125 milliGRAM(s) Oral daily  furosemide    Tablet 40 milliGRAM(s) Oral daily  heparin  Infusion. 1200 Unit(s)/Hr (12 mL/Hr) IV Continuous <Continuous>  ipratropium    for Nebulization 500 MICROGram(s) Nebulizer every 6 hours  metoprolol succinate ER 25 milliGRAM(s) Oral daily  pantoprazole  Injectable 40 milliGRAM(s) IV Push two times a day  potassium chloride    Tablet ER 20 milliEquivalent(s) Oral daily  simvastatin 5 milliGRAM(s) Oral at bedtime  warfarin 7.5 milliGRAM(s) Oral once    MEDICATIONS  (PRN):  acetaminophen   Tablet .. 650 milliGRAM(s) Oral every 6 hours PRN Mild Pain (1 - 3)  heparin  Injectable 9000 Unit(s) IV Push every 6 hours PRN For aPTT less than 40  heparin  Injectable 4000 Unit(s) IV Push every 6 hours PRN For aPTT between 40 - 57  sodium chloride 0.65% Nasal 1 Spray(s) Both Nostrils four times a day PRN Nasal Congestion    LABS:                        9.0    7.81  )-----------( 317      ( 16 Dec 2019 06:23 )             30.6   PT/INR - ( 16 Dec 2019 06:23 )   PT: 18.2 sec;   INR: 1.60 ratio         PTT - ( 16 Dec 2019 06:23 )  PTT:93.1 sec     ASSESSMENT AND PLAN:  ·	SOB, multifactorial.  ·	Chronic hypoxic hypercarbic Respiratory failure.  ·	CHF by history.  ·	pHTN.  ·	Atrial Fibrillation.  ·	S/P Mitral and Tricuspid Valve replacement.  ·	Severe Anemia.  ·	Suspect GI bleed.  ·	Renal Insuffiencey.  ·	Obesity.  ·	MIGUEL, did not tolerate CPAP.    INR now coming up, continue  Coumadin and IV heparin.  Continue Lasix and nebulizer.  On home O2.  Follow H & H.

## 2019-12-16 NOTE — PROGRESS NOTE ADULT - ASSESSMENT
IMPROVE VTE Individual Risk Assessment    RISK                                                                Points    [  ] Previous VTE                                                  3    [  ] Thrombophilia                                               2    [  ] Lower limb paralysis                                      2        (unable to hold up >15 seconds)      [  ] Current Cancer                                              2         (within 6 months)    [x  ] Immobilization > 24 hrs                                1    [  ] ICU/CCU stay > 24 hours                              1    [x  ] Age > 60                                                      1    IMPROVE VTE Score _2________    IMPROVE Score 0-1: Low Risk, No VTE prophylaxis required for most patients, encourage ambulation.   IMPROVE Score 2-3: At risk, pharmacologic VTE prophylaxis is indicated for most patients (in the absence of a contraindication)  IMPROVE Score > or = 4: High Risk, pharmacologic VTE prophylaxis is indicated for most patients (in the absence of a contraindication)    No Phar. A/C 2* GIB  11/28/19 : Asymptomatic at rest. Receiving blood transfusion. Obese. Clear lungs. RSR. GI consult.  11/29/19 : No overt bleeding. repeat Hb pending. Spoke to the cardiologist, will wait for INR to normalize before clearing for EGD if needed.  11/30/19 : Hb 8.0 No overt bleeding. INR therapeutic. Follow CBC. Pt. asymptomatic  12/01//19 : Mild SOB. Hb. trending down, transfuse 1 unit PRBC. INR 2.40. Continue monitoring Hb.  12/02/19 : Asymptomatic. S/P Transfusion. Monitor CBC. Hb 9.4.  12/03/19 : Still has dark stools. Hb. dropped to 8.5 . Spoke to Dr. Manning, may transfer to [facility].  12/04/19 : Pt. alert. Melanotic stool present. Hb dropped again. Cardiology consult noted, cleared for EGD. GI f/u.  12/05/19 : Alert, asymptomatic. Follow CBC. BUN improved. GI F/U.  12/06/19 : EGD post poned 2* Strerilization problems. Will need A/C for bridging. Discussed with Dr. Manning, want to start on IV Heparin.  monitor cbc.  12/07/19 : Awake, asymptomatic. On Fuul heparinization. Hb 7.9. Monitor CBC.   12/08/19 : On IV heparin, Hb 7.7, will transfuse . GI f/u.  12/09/19 : S/P Transfusion. FOR EGD today. Heparin held.  12/10/19 : EGD could not be done yesterday. Scheduled for today. Heparin held. Hb stable.  12/11/19 : No EGD planned. As per Anesthesia, pt. is high risk. Discussed with Dr. Manning. Restart on coumadin. May need to transfer to [place] if bleeds again.  12/12/19 : Pt. stable. No further bleeding. On coumadin with Heparin bridging. INR subtherapeutic. Continue Heparin.  12/13/19 : No bleeding. Hb. stable, 9.2. Continue Coumadin  and Heparin bridging.  12/14/19  : No bleeding. Continue Coumadin and Heparin.  12/15/19 : No bleeding. On coumadin with Heparin bridge.  12/16/19 : Asymptomatic. INR subtherapeutic. Continue Heparin and coumadin.

## 2019-12-17 LAB
APTT BLD: 124.4 SEC — CRITICAL HIGH (ref 27.5–36.3)
APTT BLD: 74 SEC — HIGH (ref 27.5–36.3)
APTT BLD: 83.6 SEC — HIGH (ref 27.5–36.3)
HCT VFR BLD CALC: 29.5 % — LOW (ref 34.5–45)
HGB BLD-MCNC: 8.7 G/DL — LOW (ref 11.5–15.5)
INR BLD: 1.87 RATIO — HIGH (ref 0.88–1.16)
MCHC RBC-ENTMCNC: 28.2 PG — SIGNIFICANT CHANGE UP (ref 27–34)
MCHC RBC-ENTMCNC: 29.5 GM/DL — LOW (ref 32–36)
MCV RBC AUTO: 95.5 FL — SIGNIFICANT CHANGE UP (ref 80–100)
NRBC # BLD: 0 /100 WBCS — SIGNIFICANT CHANGE UP (ref 0–0)
PLATELET # BLD AUTO: 306 K/UL — SIGNIFICANT CHANGE UP (ref 150–400)
PROTHROM AB SERPL-ACNC: 21.3 SEC — HIGH (ref 10–12.9)
RBC # BLD: 3.09 M/UL — LOW (ref 3.8–5.2)
RBC # FLD: 14.6 % — HIGH (ref 10.3–14.5)
WBC # BLD: 7.71 K/UL — SIGNIFICANT CHANGE UP (ref 3.8–10.5)
WBC # FLD AUTO: 7.71 K/UL — SIGNIFICANT CHANGE UP (ref 3.8–10.5)

## 2019-12-17 RX ORDER — IPRATROPIUM BROMIDE 0.2 MG/ML
500 SOLUTION, NON-ORAL INHALATION EVERY 6 HOURS
Refills: 0 | Status: DISCONTINUED | OUTPATIENT
Start: 2019-12-17 | End: 2019-12-18

## 2019-12-17 RX ORDER — WARFARIN SODIUM 2.5 MG/1
7.5 TABLET ORAL ONCE
Refills: 0 | Status: COMPLETED | OUTPATIENT
Start: 2019-12-17 | End: 2019-12-17

## 2019-12-17 RX ADMIN — Medication 25 MILLIGRAM(S): at 05:16

## 2019-12-17 RX ADMIN — Medication 100 MILLIGRAM(S): at 17:18

## 2019-12-17 RX ADMIN — PANTOPRAZOLE SODIUM 40 MILLIGRAM(S): 20 TABLET, DELAYED RELEASE ORAL at 17:17

## 2019-12-17 RX ADMIN — Medication 100 MILLIGRAM(S): at 05:16

## 2019-12-17 RX ADMIN — Medication 500 MICROGRAM(S): at 11:05

## 2019-12-17 RX ADMIN — HEPARIN SODIUM 900 UNIT(S)/HR: 5000 INJECTION INTRAVENOUS; SUBCUTANEOUS at 16:03

## 2019-12-17 RX ADMIN — WARFARIN SODIUM 7.5 MILLIGRAM(S): 2.5 TABLET ORAL at 23:13

## 2019-12-17 RX ADMIN — SIMVASTATIN 5 MILLIGRAM(S): 20 TABLET, FILM COATED ORAL at 23:13

## 2019-12-17 RX ADMIN — Medication 500 MICROGRAM(S): at 17:08

## 2019-12-17 RX ADMIN — Medication 0.12 MILLIGRAM(S): at 05:16

## 2019-12-17 RX ADMIN — Medication 20 MILLIEQUIVALENT(S): at 11:14

## 2019-12-17 RX ADMIN — Medication 500 MICROGRAM(S): at 05:39

## 2019-12-17 RX ADMIN — HEPARIN SODIUM 900 UNIT(S)/HR: 5000 INJECTION INTRAVENOUS; SUBCUTANEOUS at 07:51

## 2019-12-17 RX ADMIN — Medication 40 MILLIGRAM(S): at 05:16

## 2019-12-17 RX ADMIN — PANTOPRAZOLE SODIUM 40 MILLIGRAM(S): 20 TABLET, DELAYED RELEASE ORAL at 05:16

## 2019-12-17 NOTE — PROGRESS NOTE ADULT - PROBLEM SELECTOR PROBLEM 1
Gastrointestinal hemorrhage associated with anorectal source
Gastrointestinal hemorrhage with melena

## 2019-12-17 NOTE — PROGRESS NOTE ADULT - ASSESSMENT
IMPROVE VTE Individual Risk Assessment    RISK                                                                Points    [  ] Previous VTE                                                  3    [  ] Thrombophilia                                               2    [  ] Lower limb paralysis                                      2        (unable to hold up >15 seconds)      [  ] Current Cancer                                              2         (within 6 months)    [x  ] Immobilization > 24 hrs                                1    [  ] ICU/CCU stay > 24 hours                              1    [x  ] Age > 60                                                      1    IMPROVE VTE Score _2________    IMPROVE Score 0-1: Low Risk, No VTE prophylaxis required for most patients, encourage ambulation.   IMPROVE Score 2-3: At risk, pharmacologic VTE prophylaxis is indicated for most patients (in the absence of a contraindication)  IMPROVE Score > or = 4: High Risk, pharmacologic VTE prophylaxis is indicated for most patients (in the absence of a contraindication)    No Phar. A/C 2* GIB  11/28/19 : Asymptomatic at rest. Receiving blood transfusion. Obese. Clear lungs. RSR. GI consult.  11/29/19 : No overt bleeding. repeat Hb pending. Spoke to the cardiologist, will wait for INR to normalize before clearing for EGD if needed.  11/30/19 : Hb 8.0 No overt bleeding. INR therapeutic. Follow CBC. Pt. asymptomatic  12/01//19 : Mild SOB. Hb. trending down, transfuse 1 unit PRBC. INR 2.40. Continue monitoring Hb.  12/02/19 : Asymptomatic. S/P Transfusion. Monitor CBC. Hb 9.4.  12/03/19 : Still has dark stools. Hb. dropped to 8.5 . Spoke to Dr. Manning, may transfer to [facility].  12/04/19 : Pt. alert. Melanotic stool present. Hb dropped again. Cardiology consult noted, cleared for EGD. GI f/u.  12/05/19 : Alert, asymptomatic. Follow CBC. BUN improved. GI F/U.  12/06/19 : EGD post poned 2* Strerilization problems. Will need A/C for bridging. Discussed with Dr. Manning, want to start on IV Heparin.  monitor cbc.  12/07/19 : Awake, asymptomatic. On Fuul heparinization. Hb 7.9. Monitor CBC.   12/08/19 : On IV heparin, Hb 7.7, will transfuse . GI f/u.  12/09/19 : S/P Transfusion. FOR EGD today. Heparin held.  12/10/19 : EGD could not be done yesterday. Scheduled for today. Heparin held. Hb stable.  12/11/19 : No EGD planned. As per Anesthesia, pt. is high risk. Discussed with Dr. Manning. Restart on coumadin. May need to transfer to [place] if bleeds again.  12/12/19 : Pt. stable. No further bleeding. On coumadin with Heparin bridging. INR subtherapeutic. Continue Heparin.  12/13/19 : No bleeding. Hb. stable, 9.2. Continue Coumadin  and Heparin bridging.  12/14/19  : No bleeding. Continue Coumadin and Heparin.  12/15/19 : No bleeding. On coumadin with Heparin bridge.  12/16/19 : Asymptomatic. INR subtherapeutic. Continue Heparin and coumadin.  12/17/19 : Asymptomatic. Possible discharge in AM.

## 2019-12-17 NOTE — PROGRESS NOTE ADULT - PROBLEM/PLAN-5
No
Alert-The patient is alert, awake and responds to voice. The patient is oriented to time, place, and person. The triage nurse is able to obtain subjective information.
DISPLAY PLAN FREE TEXT

## 2019-12-17 NOTE — PROGRESS NOTE ADULT - PROBLEM SELECTOR PLAN 2
-Continue heparin drip bridge per cardiology; continue coumadin
-Continue heparin drip bridge per cardiology; continue coumadin   -Trend INR daily  -Appreciate cardiology recommendations, Dr. Lee.
-Continue heparin drip bridge per cardiology; continue coumadin   -Trend INR daily  -Appreciate cardiology recommendations, Dr. Lee.
-Continue heparin drip per cardiology   -Hold coumadin  -Trend INR daily  -Appreciate cardiology recommendations, Dr. Lee.
-Continue heparin drip per cardiology   -Ok to restart coumadin and monitor for active bleeding   -Trend INR daily  -Appreciate cardiology recommendations, Dr. Lee.
-Continue heparin drip per cardiology   -continue coumadin   -Trend INR daily  -Appreciate cardiology recommendations, Dr. Lee.
-Hold coumadin   -Appreciate cardiology recommendations   -Trend INR daily.
-Hold coumadin   -Trend INR daily  -Appreciate cardiology recommendations
-Hold coumadin  -Trend INR daily  -Appreciate cardiology recommendations, Dr. Lee
-Hold coumadin  -Trend INR daily  -Appreciate cardiology recommendations, Dr. Lee.
-Hold coumadin; consider heparin drip for bridging with close monitoring for active GI bleeding (discuss with cardiology)   -Trend INR daily  -Appreciate cardiology recommendations, Dr. Lee.
-Hold coumadin; discuss with cardiology re: role for heparin drip given high ChadsVasc  -Trend INR daily  -Appreciate cardiology recommendations** Had a discussion with Dr. Lee however will need cardiology evaluation in hospital (if he is unable to evaluate would recommend calling a different cardiologist); also potential transfer to [facility].
Monitor BP
-Hold coumadin   -Trend INR daily  -Appreciate cardiology recommendations

## 2019-12-17 NOTE — PROGRESS NOTE ADULT - SUBJECTIVE AND OBJECTIVE BOX
Patient is a 69y old  Female who presents with a chief complaint of Lethargy, SOB (16 Dec 2019 19:18)      INTERVAL HPI/OVERNIGHT EVENTS:    MEDICATIONS  (STANDING):  allopurinol 100 milliGRAM(s) Oral two times a day  digoxin     Tablet 0.125 milliGRAM(s) Oral daily  furosemide    Tablet 40 milliGRAM(s) Oral daily  heparin  Infusion. 1200 Unit(s)/Hr (12 mL/Hr) IV Continuous <Continuous>  ipratropium    for Nebulization 500 MICROGram(s) Nebulizer every 6 hours  metoprolol succinate ER 25 milliGRAM(s) Oral daily  pantoprazole    Tablet 40 milliGRAM(s) Oral two times a day  potassium chloride    Tablet ER 20 milliEquivalent(s) Oral daily  simvastatin 5 milliGRAM(s) Oral at bedtime  warfarin 7.5 milliGRAM(s) Oral once    MEDICATIONS  (PRN):  acetaminophen   Tablet .. 650 milliGRAM(s) Oral every 6 hours PRN Mild Pain (1 - 3)  heparin  Injectable 9000 Unit(s) IV Push every 6 hours PRN For aPTT less than 40  heparin  Injectable 4000 Unit(s) IV Push every 6 hours PRN For aPTT between 40 - 57  sodium chloride 0.65% Nasal 1 Spray(s) Both Nostrils four times a day PRN Nasal Congestion      Allergies    No Known Allergies    Intolerances        REVIEW OF SYSTEMS:  CONSTITUTIONAL: No fever, weight loss, or fatigue  EYES: No eye pain, visual disturbances, or discharge  ENMT:  No difficulty hearing, tinnitus, vertigo; No sinus or throat pain  NECK: No pain or stiffness  BREASTS: No pain, masses, or nipple discharge  RESPIRATORY: No cough, wheezing, chills or hemoptysis; No shortness of breath  CARDIOVASCULAR: No chest pain, palpitations, dizziness, or leg swelling  GASTROINTESTINAL: No abdominal or epigastric pain. No nausea, vomiting, or hematemesis; No diarrhea or constipation. No melena or hematochezia.  GENITOURINARY: No dysuria, frequency, hematuria, or incontinence  NEUROLOGICAL: No headaches, memory loss, loss of strength, numbness, or tremors  SKIN: No itching, burning, rashes, or lesions   LYMPH NODES: No enlarged glands  ENDOCRINE: No heat or cold intolerance; No hair loss  MUSCULOSKELETAL: No joint pain or swelling; No muscle, back, or extremity pain  PSYCHIATRIC: No depression, anxiety, mood swings, or difficulty sleeping  HEME/LYMPH: No easy bruising, or bleeding gums  ALLERGY AND IMMUNOLOGIC: No hives or eczema    Vital Signs Last 24 Hrs  T(C): 36.6 (17 Dec 2019 05:28), Max: 37.1 (16 Dec 2019 23:31)  T(F): 97.8 (17 Dec 2019 05:28), Max: 98.7 (16 Dec 2019 23:31)  HR: 66 (17 Dec 2019 05:28) (58 - 66)  BP: 120/64 (17 Dec 2019 05:28) (120/64 - 146/73)  BP(mean): --  RR: 18 (17 Dec 2019 05:28) (18 - 18)  SpO2: 100% (17 Dec 2019 05:28) (95% - 100%)    PHYSICAL EXAM:  GENERAL: NAD, well-groomed, well-developed. Obese  HEAD:  Atraumatic, Normocephalic  EYES: EOMI, PERRL, conjunctiva and sclera clear  ENMT:  Moist mucous membranes, Good dentition, No lesions  NECK: Supple, No JVD, Normal thyroid  NERVOUS SYSTEM:  Alert & Oriented X3, Good concentration; Motor Strength 5/5 B/L upper and lower extremities; DTRs 2+ intact and symmetric  CHEST/LUNG: Clear to percussion bilaterally; No rales, rhonchi, wheezing, or rubs  HEART: Regular rate and rhythm; No murmurs, rubs, or gallops  ABDOMEN: Soft, Nontender, Nondistended; Bowel sounds present  EXTREMITIES:  2+ Peripheral Pulses, No clubbing, cyanosis, or edema  LYMPH: No lymphadenopathy noted  SKIN: No rashes or lesions    LABS:                        8.7    7.71  )-----------( 306      ( 17 Dec 2019 07:03 )             29.5           PT/INR - ( 17 Dec 2019 07:03 )   PT: 21.3 sec;   INR: 1.87 ratio         PTT - ( 17 Dec 2019 07:03 )  PTT:124.4 sec    CAPILLARY BLOOD GLUCOSE                        RADIOLOGY & ADDITIONAL TESTS:    Imaging Personally Reviewed:  [ ] YES  [ ] NO    Consultant(s) Notes Reviewed:  [ ] YES  [ ] NO    Care Discussed with Consultants/Other Providers [ ] YES  [ ] NO    PROBLEMS:  GI BLEED  SHORTNESS OF BREATH  GI bleed  Atrial fibrillation, unspecified type  Gastrointestinal hemorrhage with melena  Mitral valve replaced  Longstanding persistent atrial fibrillation  Pulmonary hypertension  MIGUEL (obstructive sleep apnea)  Chronic obstructive pulmonary disease, unspecified COPD type  Gout  Essential hypertension  Gastrointestinal hemorrhage associated with anorectal source      Care discussed with family,         [  ]   yes  [  ]  No    imp:    stable[ ]    unstable[  ]     improving [   ]       unchanged  [  ]                Plans:  Continue present plans  [  ]               New consult [  ]   specialty  .......               order maribell[  ]    test name.                  Discharge Planning  [  ]

## 2019-12-17 NOTE — PROGRESS NOTE ADULT - REASON FOR ADMISSION
Lethargy, SOB

## 2019-12-17 NOTE — PROGRESS NOTE ADULT - SUBJECTIVE AND OBJECTIVE BOX
INTERVAL HPI:  69 year female with HTN, A fib, Mitral & Tricuspid valve replacement, CHF, COPD(never smoked) on home O2, Obesity, MIGUEL(did not tolerate CPAP), pHTN, Gout  Brought by EMS c/o progressively sob,  dizziness for two weeks,  unable to walk about 1/2 block. Increases  sob to laid down and decreases to sit up. Denies cough, chest pain, nausea, vomiting, fever, chills, abd. Also c/o black stool in the past one to two weeks, stopped taking iron pills three weeks ago.  Never smoked.    OVERNIGHT EVENTS:  Comfortable in bed.    Vital Signs Last 24 Hrs  T(C): 36.7 (17 Dec 2019 12:31), Max: 37.1 (16 Dec 2019 23:31)  T(F): 98 (17 Dec 2019 12:31), Max: 98.7 (16 Dec 2019 23:31)  HR: 61 (17 Dec 2019 12:31) (61 - 66)  BP: 132/72 (17 Dec 2019 12:31) (120/64 - 146/73)  BP(mean): --  RR: 18 (17 Dec 2019 12:31) (18 - 18)  SpO2: 98% (17 Dec 2019 12:31) (95% - 100%)    PHYSICAL EXAM:  GEN:         Awake, responsive and comfortable.  HEENT:    Normal.    RESP:        no wheezing.  CVS:           Regular rate and rhythm.   ABD:         Soft, non-tender, non-distended;     MEDICATIONS  (STANDING):  allopurinol 100 milliGRAM(s) Oral two times a day  digoxin     Tablet 0.125 milliGRAM(s) Oral daily  furosemide    Tablet 40 milliGRAM(s) Oral daily  heparin  Infusion. 1200 Unit(s)/Hr (12 mL/Hr) IV Continuous <Continuous>  ipratropium    for Nebulization 500 MICROGram(s) Nebulizer every 6 hours  metoprolol succinate ER 25 milliGRAM(s) Oral daily  pantoprazole    Tablet 40 milliGRAM(s) Oral two times a day  potassium chloride    Tablet ER 20 milliEquivalent(s) Oral daily  simvastatin 5 milliGRAM(s) Oral at bedtime  warfarin 7.5 milliGRAM(s) Oral once    MEDICATIONS  (PRN):  acetaminophen   Tablet .. 650 milliGRAM(s) Oral every 6 hours PRN Mild Pain (1 - 3)  heparin  Injectable 9000 Unit(s) IV Push every 6 hours PRN For aPTT less than 40  heparin  Injectable 4000 Unit(s) IV Push every 6 hours PRN For aPTT between 40 - 57  sodium chloride 0.65% Nasal 1 Spray(s) Both Nostrils four times a day PRN Nasal Congestion    LABS:                        8.7    7.71  )-----------( 306      ( 17 Dec 2019 07:03 )             29.5   PT/INR - ( 17 Dec 2019 07:03 )   PT: 21.3 sec;   INR: 1.87 ratio      PTT - ( 17 Dec 2019 07:03 )  PTT:124.4 sec    ASSESSMENT AND PLAN:  ·	SOB, multifactorial.  ·	Chronic hypoxic hypercarbic Respiratory failure.  ·	CHF by history.  ·	pHTN.  ·	Atrial Fibrillation.  ·	S/P Mitral and Tricuspid Valve replacement.  ·	Severe Anemia.  ·	Suspect GI bleed.  ·	Renal Insuffiencey.  ·	Obesity.  ·	MIGUEL, did not tolerate CPAP.    INR 1.87 now. On Coumadin and IV heparin.  Continue Lasix and nebulizer.  On home O2.  Follow [facility].

## 2019-12-17 NOTE — PROGRESS NOTE ADULT - PROBLEM SELECTOR PLAN 1
**Plan for EGD Monday 12/9 pending endoscope sterilizer fixed (consider transfer to other hospital, if signs/symptoms active bleeding)   -Continue Protonix 40 mg IV BID   -Appreciate anesthesia recommendations given comorbidities (including severe pulm htn);  -Appreciate cardiology recommendations re: clearance, optimization  -Appreciate pulmonary recommendations re: clearance, optimization  -Holding coumadin   -Trend INR daily   -IV hydration   -Trend Hg, transfuse < 7.
**Plan for EGD Monday 12/9 pending endoscope sterilizer fixed (consider transfer to other hospital, if signs/symptoms active bleeding)   -Continue Protonix 40 mg IV BID   -Appreciate anesthesia recommendations given comorbidities (including severe pulm htn);  -Appreciate cardiology recommendations re: clearance, optimization  -Appreciate pulmonary recommendations re: clearance, optimization  -Holding coumadin; on heparin drip for bridging given subtherapeutic INR   -Trend INR daily   -IV hydration   -Trend Hg, transfuse < 7.
**Plan for EGD Today  12/9 pending endoscope sterilizer fixed (consider transfer to other hospital, if signs/symptoms active bleeding)   -Continue Protonix 40 mg IV BID   -Appreciate anesthesia recommendations given comorbidities (including severe pulm htn);  -Appreciate cardiology recommendations re: clearance, optimization  -Appreciate pulmonary recommendations re: clearance, optimization  -Holding coumadin; on heparin drip for bridging given subtherapeutic INR   -Trend INR daily   -IV hydration   -Trend Hg, transfuse < 7.
**Plan for EGD pending endoscope sterilizer fixed (consider transfer to other hospital)   -Continue Protonix 40 mg IV BID   -Appreciate anesthesia recommendations given comorbidities (including severe pulm htn);  -Appreciate cardiology recommendations re: clearance, optimization  -Appreciate pulmonary recommendations re: clearance, optimization  -Holding coumadin   -Trend INR daily   -IV hydration   -Trend Hg, transfuse < 7.
-Continue Protonix 40 mg IV BID   **Hold upper endoscopy; unable to obtain cardiac clearance from Dr. Lee who believes procedure for this patient is too high risk; will monitor for signs/symptoms of active GI bleeding and consider  upper gastrointestinal endoscopy if persistent anemia or active bleeding  -Holding coumadin   -Trend INR daily   -Advance diet as long as patient shows no signs of bleeding  -IV hydration   -Trend Hg q8h, transfuse < 7.
-Continue Protonix 40 mg IV BID   **Hold upper endoscopy; unable to obtain cardiac clearance given very high risk; if active bleeding will discuss with cardiology and consider endoscopic management   -Holding coumadin   -Trend INR daily   -Advance diet as long as patient shows no signs of bleeding  -IV hydration   -Trend Hg q8h, transfuse < 7.
-Continue Protonix 40 mg IV BID   *Plan for EGD Friday; NPO midnight   -Anesthesiology consultation for review prior to procedure   -Appreciate cardiology recommendations re: clearance   -Holding coumadin   -Trend INR daily   -Advance diet as long as patient shows no signs of bleeding  -IV hydration   -Trend Hg q8h, transfuse < 7.
-Continue Protonix 40 mg IV BID   *Plan for EGD Friday; NPO midnight   -Appreciate anesthesia recommendations given cormorbidities (including severe pulm htn);  -Appreciate cardiology recommendations re: clearance, optimization  -Appreciate pulmonary recommendations re: clearance, optimization  -Holding coumadin   -Trend INR daily   -IV hydration   -Trend Hg, transfuse < 7.
-Continue Protonix 40 mg IV BID   -Continue heparin drip bridge; continue coumadin   -Appreciate cardiology recommendations  -Appreciate pulmonary recommendations   -Trend INR daily   -IV hydration   -Trend Hg, transfuse < 7.
-Continue Protonix 40 mg IV BID   -Continue heparin drip bridge; continue coumadin   -Appreciate cardiology recommendations  -Appreciate pulmonary recommendations   -Trend INR daily   -IV hydration   -Trend Hg, transfuse < 7.
-Continue Protonix 40 mg IV BID   -Continue heparin drip; ok to restart coumadin   -Appreciate anesthesia recommendations given comorbidities (including severe pulm htn);  -Appreciate cardiology recommendations re: clearance, optimization  -Appreciate pulmonary recommendations re: clearance, optimization  -Trend INR daily   -IV hydration   -Trend Hg, transfuse < 7.
-Continue Protonix 40 mg IV BID   -Continue heparin drip; ok to restart coumadin   -Appreciate anesthesia recommendations given comorbidities (including severe pulm htn);  -Appreciate cardiology recommendations re: clearance, optimization  -Appreciate pulmonary recommendations re: clearance, optimization  -Trend INR daily   -IV hydration   -Trend Hg, transfuse < 7.
-Continue Protonix 40 mg IV BID   -Holding coumadin   -Trend INR daily   -Advance diet as long as patient shows no signs of bleeding  -IV hydration   -She will need upper endoscopy for further evaluation (goal < 2.5 if endoscopic hemostasis is required); will require cardiac clearance prior to endoscopic evaluation given extensive cardiac history   -Trend Hg q8h, transfuse < 7.
-Continue Protonix 40 mg IV BID, HGB stable  -Continue heparin drip bridge; continue coumadin    -Trend INR daily   -IV hydration, Trend Hg, transfuse < 7.
-Protonix 8 ml/hr gtt  -Holding coumadin   -Trend INR daily   -IV hydration   -She will need upper endoscopy for further evaluation (goal < 2.5 if endoscopic hemostasis is required); will require cardiac clearance prior to endoscopic evaluation given extensive cardiac history   -Trend Hg q8h, transfuse < 7.
-Switch to Protonix 40 mg IV BID   -Holding coumadin   -Trend INR daily   -IV hydration   -She will need upper endoscopy for further evaluation (goal < 2.5 if endoscopic hemostasis is required); will require cardiac clearance prior to endoscopic evaluation given extensive cardiac history   -Trend Hg q8h, transfuse < 7.
Transfusion, GI consult
-Continue Protonix 40 mg IV BID   -Holding coumadin   -Trend INR daily   -Advance diet as long as patient shows no signs of bleeding  -IV hydration   -She will need upper endoscopy for further evaluation (goal < 2.5 if endoscopic hemostasis is required); will require cardiac clearance prior to endoscopic evaluation given extensive cardiac history   -Trend Hg q8h, transfuse < 7.

## 2019-12-17 NOTE — PROGRESS NOTE ADULT - PROBLEM SELECTOR PROBLEM 2
Atrial fibrillation, unspecified type
Essential hypertension
Atrial fibrillation, unspecified type

## 2019-12-17 NOTE — PROGRESS NOTE ADULT - SUBJECTIVE AND OBJECTIVE BOX
Gastroenterology  Patient seen and examined bedside resting comfortably.  No complaints offered.   Denies any rectal bleeding, melena.   Ambulating well with PT.     T(F): 98 (12-17-19 @ 12:31), Max: 98.7 (12-16-19 @ 23:31)  HR: 61 (12-17-19 @ 12:31) (61 - 66)  BP: 132/72 (12-17-19 @ 12:31) (120/64 - 146/73)  RR: 18 (12-17-19 @ 12:31) (18 - 18)  SpO2: 98% (12-17-19 @ 12:31) (95% - 100%)  Wt(kg): --  CAPILLARY BLOOD GLUCOSE        PHYSICAL EXAM:  General: NAD, obese    Neuro:  Alert & responsive  HEENT: NCAT, EOMI, conjunctiva clear  CV: +S1+S2 regular rate and rhythm  Lung: clear to ausculation bilaterally, respirations nonlabored, good inspiratory effort  Abdomen: soft, Non Tender, No distention Normal active BS  Extremities: no cyanosis, clubbing or edema    LABS:                        8.7    7.71  )-----------( 306      ( 17 Dec 2019 07:03 )             29.5             PT/INR - ( 17 Dec 2019 07:03 )   PT: 21.3 sec;   INR: 1.87 ratio         PTT - ( 17 Dec 2019 14:44 )  PTT:74.0 sec  I&O's Detail    16 Dec 2019 07:01  -  17 Dec 2019 07:00  --------------------------------------------------------  IN:    Oral Fluid: 240 mL  Total IN: 240 mL    OUT:  Total OUT: 0 mL    Total NET: 240 mL

## 2019-12-18 ENCOUNTER — TRANSCRIPTION ENCOUNTER (OUTPATIENT)
Age: 69
End: 2019-12-18

## 2019-12-18 VITALS
RESPIRATION RATE: 18 BRPM | DIASTOLIC BLOOD PRESSURE: 59 MMHG | OXYGEN SATURATION: 99 % | HEART RATE: 62 BPM | SYSTOLIC BLOOD PRESSURE: 131 MMHG | TEMPERATURE: 98 F

## 2019-12-18 LAB
ANION GAP SERPL CALC-SCNC: 2 MMOL/L — LOW (ref 5–17)
BUN SERPL-MCNC: 24 MG/DL — HIGH (ref 7–23)
CALCIUM SERPL-MCNC: 9.9 MG/DL — SIGNIFICANT CHANGE UP (ref 8.5–10.1)
CHLORIDE SERPL-SCNC: 99 MMOL/L — SIGNIFICANT CHANGE UP (ref 96–108)
CO2 SERPL-SCNC: 36 MMOL/L — HIGH (ref 22–31)
CREAT SERPL-MCNC: 1.36 MG/DL — HIGH (ref 0.5–1.3)
GLUCOSE SERPL-MCNC: 92 MG/DL — SIGNIFICANT CHANGE UP (ref 70–99)
HCT VFR BLD CALC: 31.1 % — LOW (ref 34.5–45)
HGB BLD-MCNC: 9.2 G/DL — LOW (ref 11.5–15.5)
INR BLD: 2.01 RATIO — HIGH (ref 0.88–1.16)
MCHC RBC-ENTMCNC: 28.4 PG — SIGNIFICANT CHANGE UP (ref 27–34)
MCHC RBC-ENTMCNC: 29.6 GM/DL — LOW (ref 32–36)
MCV RBC AUTO: 96 FL — SIGNIFICANT CHANGE UP (ref 80–100)
NRBC # BLD: 0 /100 WBCS — SIGNIFICANT CHANGE UP (ref 0–0)
PLATELET # BLD AUTO: 316 K/UL — SIGNIFICANT CHANGE UP (ref 150–400)
POTASSIUM SERPL-MCNC: 4.7 MMOL/L — SIGNIFICANT CHANGE UP (ref 3.5–5.3)
POTASSIUM SERPL-SCNC: 4.7 MMOL/L — SIGNIFICANT CHANGE UP (ref 3.5–5.3)
PROTHROM AB SERPL-ACNC: 23 SEC — HIGH (ref 10–12.9)
RBC # BLD: 3.24 M/UL — LOW (ref 3.8–5.2)
RBC # FLD: 14.4 % — SIGNIFICANT CHANGE UP (ref 10.3–14.5)
SODIUM SERPL-SCNC: 137 MMOL/L — SIGNIFICANT CHANGE UP (ref 135–145)
WBC # BLD: 7.66 K/UL — SIGNIFICANT CHANGE UP (ref 3.8–10.5)
WBC # FLD AUTO: 7.66 K/UL — SIGNIFICANT CHANGE UP (ref 3.8–10.5)

## 2019-12-18 RX ORDER — PANTOPRAZOLE SODIUM 20 MG/1
1 TABLET, DELAYED RELEASE ORAL
Qty: 0 | Refills: 0 | DISCHARGE
Start: 2019-12-18

## 2019-12-18 RX ADMIN — HEPARIN SODIUM 900 UNIT(S)/HR: 5000 INJECTION INTRAVENOUS; SUBCUTANEOUS at 00:09

## 2019-12-18 RX ADMIN — Medication 25 MILLIGRAM(S): at 05:19

## 2019-12-18 RX ADMIN — PANTOPRAZOLE SODIUM 40 MILLIGRAM(S): 20 TABLET, DELAYED RELEASE ORAL at 05:20

## 2019-12-18 RX ADMIN — Medication 0.12 MILLIGRAM(S): at 05:19

## 2019-12-18 RX ADMIN — Medication 20 MILLIEQUIVALENT(S): at 11:12

## 2019-12-18 RX ADMIN — Medication 40 MILLIGRAM(S): at 05:19

## 2019-12-18 RX ADMIN — Medication 100 MILLIGRAM(S): at 05:19

## 2019-12-18 NOTE — DISCHARGE NOTE PROVIDER - CARE PROVIDER_API CALL
Gerald Manning)  Cardiovascular Disease; Sleep Medicine  [address] [place] [address]  Phone: [phone]  Fax: [phone]  Follow Up Time:

## 2019-12-18 NOTE — DISCHARGE NOTE PROVIDER - NSDCMRMEDTOKEN_GEN_ALL_CORE_FT
allopurinol 100 mg oral tablet: 1 tab(s) orally 2 times a day (after meals)  Coumadin 5 mg oral tablet: 1 tab(s) orally once a day  digoxin 125 mcg (0.125 mg) oral tablet: 1 tab(s) orally once a day  furosemide 40 mg oral tablet: 1 tab(s) orally once a day  Klor-Con M20 oral tablet, extended release: orally once a day  Metoprolol Succinate ER 25 mg oral tablet, extended release: 1 tab(s) orally once a day  pantoprazole 40 mg oral delayed release tablet: 1 tab(s) orally 2 times a day  ramipril 5 mg oral capsule: 1 cap(s) orally once a day  simvastatin 5 mg oral tablet: orally once a day

## 2019-12-18 NOTE — DISCHARGE NOTE PROVIDER - HOSPITAL COURSE
Patient is a 69y old  Female who presents with a chief complaint of Lethargy, SOB (16 Dec 2019 Patient is a 69y old  Female who presents with a chief complaint of Lethargy, SOB (16 Dec 2019     11/28/19 : Asymptomatic at rest. Receiving blood transfusion. Obese. Clear lungs. RSR. GI consult.    11/29/19 : No overt bleeding. repeat Hb pending. Spoke to the cardiologist, will wait for INR to normalize before clearing for EGD if needed.    11/30/19 : Hb 8.0 No overt bleeding. INR therapeutic. Follow CBC. Pt. asymptomatic    12/01//19 : Mild SOB. Hb. trending down, transfuse 1 unit PRBC. INR 2.40. Continue monitoring Hb.    12/02/19 : Asymptomatic. S/P Transfusion. Monitor CBC. Hb 9.4.    12/03/19 : Still has dark stools. Hb. dropped to 8.5 . Spoke to Dr. Manning, may transfer to [facility].    12/04/19 : Pt. alert. Melanotic stool present. Hb dropped again. Cardiology consult noted, cleared for EGD. GI f/u.    12/05/19 : Alert, asymptomatic. Follow CBC. BUN improved. GI F/U.    12/06/19 : EGD post poned 2* Strerilization problems. Will need A/C for bridging. Discussed with Dr. Manning, want to start on IV Heparin.    monitor cbc.    12/07/19 : Awake, asymptomatic. On Fuul heparinization. Hb 7.9. Monitor CBC.     12/08/19 : On IV heparin, Hb 7.7, will transfuse . GI f/u.    12/09/19 : S/P Transfusion. FOR EGD today. Heparin held.    12/10/19 : EGD could not be done yesterday. Scheduled for today. Heparin held. Hb stable.    12/11/19 : No EGD planned. As per Anesthesia, pt. is high risk. Discussed with Dr. Manning. Restart on coumadin. May need to transfer to [place] if bleeds again.    12/12/19 : Pt. stable. No further bleeding. On coumadin with Heparin bridging. INR subtherapeutic. Continue Heparin.    12/13/19 : No bleeding. Hb. stable, 9.2. Continue Coumadin  and Heparin bridging.    12/14/19  : No bleeding. Continue Coumadin and Heparin.    12/15/19 : No bleeding. On coumadin with Heparin bridge.    12/16/19 : Asymptomatic. INR subtherapeutic. Continue Heparin and coumadin.    12/17/19 : Asymptomatic. Possible discharge in AM.    12/18/19 : INR therapeutic. Discharge on Coumadin 6 mg. F/U with cardiology for INR in 36 hrs.

## 2019-12-18 NOTE — DISCHARGE NOTE NURSING/CASE MANAGEMENT/SOCIAL WORK - PATIENT PORTAL LINK FT
You can access the FollowMyHealth Patient Portal offered by [facility] by registering at the following website: [email or web address]. By joining [employer]’s FollowMyHealth portal, you will also be able to view your health information using other applications (apps) compatible with our system.

## 2019-12-20 DIAGNOSIS — Z99.81 DEPENDENCE ON SUPPLEMENTAL OXYGEN: ICD-10-CM

## 2019-12-20 DIAGNOSIS — I27.20 PULMONARY HYPERTENSION, UNSPECIFIED: ICD-10-CM

## 2019-12-20 DIAGNOSIS — Z53.8 PROCEDURE AND TREATMENT NOT CARRIED OUT FOR OTHER REASONS: ICD-10-CM

## 2019-12-20 DIAGNOSIS — M1A.9XX0 CHRONIC GOUT, UNSPECIFIED, WITHOUT TOPHUS (TOPHI): ICD-10-CM

## 2019-12-20 DIAGNOSIS — J44.9 CHRONIC OBSTRUCTIVE PULMONARY DISEASE, UNSPECIFIED: ICD-10-CM

## 2019-12-20 DIAGNOSIS — D62 ACUTE POSTHEMORRHAGIC ANEMIA: ICD-10-CM

## 2019-12-20 DIAGNOSIS — G47.33 OBSTRUCTIVE SLEEP APNEA (ADULT) (PEDIATRIC): ICD-10-CM

## 2019-12-20 DIAGNOSIS — R06.02 SHORTNESS OF BREATH: ICD-10-CM

## 2019-12-20 DIAGNOSIS — K92.2 GASTROINTESTINAL HEMORRHAGE, UNSPECIFIED: ICD-10-CM

## 2019-12-20 DIAGNOSIS — E66.01 MORBID (SEVERE) OBESITY DUE TO EXCESS CALORIES: ICD-10-CM

## 2019-12-20 DIAGNOSIS — Z95.2 PRESENCE OF PROSTHETIC HEART VALVE: ICD-10-CM

## 2019-12-20 DIAGNOSIS — J96.12 CHRONIC RESPIRATORY FAILURE WITH HYPERCAPNIA: ICD-10-CM

## 2019-12-20 DIAGNOSIS — I48.19 OTHER PERSISTENT ATRIAL FIBRILLATION: ICD-10-CM

## 2019-12-20 DIAGNOSIS — I50.9 HEART FAILURE, UNSPECIFIED: ICD-10-CM

## 2019-12-20 DIAGNOSIS — N28.9 DISORDER OF KIDNEY AND URETER, UNSPECIFIED: ICD-10-CM

## 2019-12-20 DIAGNOSIS — I11.0 HYPERTENSIVE HEART DISEASE WITH HEART FAILURE: ICD-10-CM

## 2019-12-20 DIAGNOSIS — J96.11 CHRONIC RESPIRATORY FAILURE WITH HYPOXIA: ICD-10-CM

## 2020-01-31 ENCOUNTER — EMERGENCY (EMERGENCY)
Facility: HOSPITAL | Age: 70
LOS: 0 days | Discharge: HOME | End: 2020-01-31
Attending: EMERGENCY MEDICINE
Payer: MEDICARE

## 2020-01-31 VITALS
DIASTOLIC BLOOD PRESSURE: 85 MMHG | OXYGEN SATURATION: 100 % | SYSTOLIC BLOOD PRESSURE: 148 MMHG | HEIGHT: 64 IN | RESPIRATION RATE: 20 BRPM | WEIGHT: 240.08 LBS | TEMPERATURE: 98 F | HEART RATE: 94 BPM

## 2020-01-31 DIAGNOSIS — Z95.4 PRESENCE OF OTHER HEART-VALVE REPLACEMENT: Chronic | ICD-10-CM

## 2020-01-31 LAB
INR BLD: 5.55 RATIO — CRITICAL HIGH (ref 0.88–1.16)
PROTHROM AB SERPL-ACNC: 65.5 SEC — HIGH (ref 10–12.9)

## 2020-01-31 PROCEDURE — 99284 EMERGENCY DEPT VISIT MOD MDM: CPT | Mod: 25

## 2020-01-31 PROCEDURE — 30901 CONTROL OF NOSEBLEED: CPT

## 2020-01-31 NOTE — ED PROVIDER NOTE - PATIENT PORTAL LINK FT
You can access the FollowMyHealth Patient Portal offered by [facility] by registering at the following website: [email or web address]. By joining [employer]’s FollowMyHealth portal, you will also be able to view your health information using other applications (apps) compatible with our system.

## 2020-01-31 NOTE — ED PROVIDER NOTE - CLINICAL SUMMARY MEDICAL DECISION MAKING FREE TEXT BOX
pt well appearing, no distress. cauterized in ER, no active bleeding. INR elev advise pt to hold coumadin x 2 days and fu with pmd.

## 2020-01-31 NOTE — ED ADULT NURSE NOTE - OBJECTIVE STATEMENT
ems states, " Pt c/o right nare nose bleed started last night and again this morning , pt is on wafarin " bleeding is controlled at this time

## 2020-01-31 NOTE — ED PROVIDER NOTE - OBJECTIVE STATEMENT
Pertinent PMH/PSH/FHx/SHx and Review of Systems contained within:   70 y/o CHF, COPD, HTN, Gout, and pulmonary HTN, presents to the ER for right nares intermittent epistaxis since last night. Pt uses a nasal canula at all times and suspects irritation. Pt on coumadin, last check was 3 weeks ago.    No fever/chills, No photophobia/eye pain/changes in vision, No ear pain/sore throat/dysphagia, No chest pain/palpitations, no SOB/cough/wheeze/stridor, No abdominal pain, No N/V/D, no dysuria/frequency/discharge, No neck/back pain, no rash, no changes in neurological status/function. +epistaxis Pertinent PMH/PSH/FHx/SHx and Review of Systems contained within:   70 y/o CHF, COPD on home O2, HTN, Gout, and pulmonary HTN, afib on coumadin, presents to the ER for right nares intermittent epistaxis since last night. Pt uses a nasal canula at all times. Pt on coumadin, last check was 3 weeks ago.    No fever/chills, No photophobia/eye pain/changes in vision, No ear pain/sore throat/dysphagia, No chest pain/palpitations, no SOB/cough/wheeze/stridor, No abdominal pain, No N/V/D, no dysuria/frequency/discharge, No neck/back pain, no rash, no changes in neurological status/function. +epistaxis

## 2020-01-31 NOTE — ED ADULT TRIAGE NOTE - CHIEF COMPLAINT QUOTE
ems states, " Pt c/o right nare nose bleed started last night and again this morning , pt is on wafarin "

## 2020-01-31 NOTE — ED PROVIDER NOTE - PHYSICAL EXAMINATION
Gen: Alert, Well appearing. NAD    Head: NC, AT, PERRL, normal lids/conjunctiva   ENT: Bilateral TM WNL, patent oropharynx without erythema/exudate, uvula midline, + rt medial septum area of oozing blood  Neck: supple, no tenderness/meningismus  Pulm: Bilateral clear BS, normal resp effort  CV: RRR, no M/R/G, +dist pulses   Abd: soft, NT/ND, +BS, no guarding/rebound tenderness  Mskel:  no edema/erythema/cyanosis   Skin: no rash, no bruising  Neuro: AAOx3, no sensory/motor deficits, CN 2-12 intact

## 2020-06-03 NOTE — ED SUB INTERN NOTE - SOCIAL CONCERNS
Encounter Summary
  Created on: 2020
 
 SantosVeda
 External Reference #: [id number]
 : 41
 Sex: Female
 
 Demographics
 
 
+-----------------------+----------------------+
| Address               | [address]     |
|                       | [place]  [address] |
+-----------------------+----------------------+
| Home Phone            | [phone]      |
+-----------------------+----------------------+
| Preferred Language    | Unknown              |
+-----------------------+----------------------+
| Marital Status        |               |
+-----------------------+----------------------+
| Alevism Affiliation | [id number]                 |
+-----------------------+----------------------+
| Race                  | Unknown              |
+-----------------------+----------------------+
| Ethnic Group          | Unknown              |
+-----------------------+----------------------+
 
 
 Author
 
 
+--------------+--------------------------------------------+
| Author       | [facility]  |
|              | [facility]                                |
+--------------+--------------------------------------------+
| Organization | [facility]  |
|              | and [facility]                                |
+--------------+--------------------------------------------+
| Address      | Unknown                                    |
+--------------+--------------------------------------------+
| Phone        | Unavailable                                |
+--------------+--------------------------------------------+
 
 
 
 Support
 
 
+----------------+--------------+-----------------+-----------------+
| Name           | Relationship | Address         | Phone           |
+----------------+--------------+-----------------+-----------------+
| Johan Santos | ECON         | [address] | [phone] |
|                |              | [place]  [address] |                 |
+----------------+--------------+-----------------+-----------------+
| Ariel Santos   | ECON         | Unknown         | [phone] |
+----------------+--------------+-----------------+-----------------+
| Luis Santos   | ECON         | Unknown         | [phone] |
 
+----------------+--------------+-----------------+-----------------+
 
 
 
 Care Team Providers
 
 
+--------------------------+------+-----------------+
| Care Team Member Name    | Role | Phone           |
+--------------------------+------+-----------------+
| William Ferguson MD | PCP  | [phone] |
+--------------------------+------+-----------------+
 
 
 
 Reason for Visit
 
 
+-------------------+----------+
| Reason            | Comments |
+-------------------+----------+
| Medication Refill |          |
+-------------------+----------+
 
 
 
 Encounter Details
 
 
+--------+--------+---------------------+----------------------+-------------------+
| Date   | Type   | Department          | Care Team            | Description       |
+--------+--------+---------------------+----------------------+-------------------+
| / | Refill |   [facility]         |   [place],       | Medication Refill |
| 2017   |        | NEPHROLOGY  [address]   | [address] |                   |
|        |        | [address]   |  [address] [place]    |                   |
|        |        | [place], [place]     | [address]  [place] |                   |
|        |        | [address]          |  [address]  [phone] |                   |
|        |        | [phone]        |   [phone] (Fax) |                   |
+--------+--------+---------------------+----------------------+-------------------+
 
 
 
 Social History
 
 
+---------------+------------+-----------+--------+------------------+
| Tobacco Use   | Types      | Packs/Day | Years  | Date             |
|               |            |           | Used   |                  |
+---------------+------------+-----------+--------+------------------+
| Former Smoker | Cigarettes | 0.25      | 5      | Quit: 1968 |
+---------------+------------+-----------+--------+------------------+
 
 
 
+---------------------+---+---+---+
| Smokeless Tobacco:  |   |   |   |
| Never Used          |   |   |   |
+---------------------+---+---+---+
 
 
 
 
+-------------+-------------+---------+--------------+
| Alcohol Use | Drinks/Week | oz/Week | Comments     |
+-------------+-------------+---------+--------------+
| Yes         |             |         | Twice a year |
+-------------+-------------+---------+--------------+
 
 
 
+------------------+---------------+
| Sex Assigned at  | Date Recorded |
| Birth            |               |
+------------------+---------------+
| Not on file      |               |
+------------------+---------------+
 
 
 
+----------------+-------------+-------------+
| Job Start Date | Occupation  | Industry    |
+----------------+-------------+-------------+
| Not on file    | Not on file | Not on file |
+----------------+-------------+-------------+
 
 
 
+----------------+--------------+------------+
| Travel History | Travel Start | Travel End |
+----------------+--------------+------------+
 
 
 
+-------------------------------------+
| No recent travel history available. |
+-------------------------------------+
 documented as of this encounter
 
 Plan of Treatment
 
 
+--------+---------+------------+----------------------+-------------+
| Date   | Type    | Specialty  | Care Team            | Description |
+--------+---------+------------+----------------------+-------------+
| / | Office  | Nephrology |   Dante Escudero MD  |             |
| 2020   | Visit   |            |  [address]  |             |
|        |         |            | [address]  [place]   |             |
|        |         |            | [address]  [phone]  |             |
|        |         |            |  [phone] (Fax)  |             |
+--------+---------+------------+----------------------+-------------+
 documented as of this encounter
 
 Visit Diagnoses
 Not on filedocumented in this encounter None

## 2020-06-20 ENCOUNTER — INPATIENT (INPATIENT)
Facility: HOSPITAL | Age: 70
LOS: 9 days | Discharge: HOME HEALTH | End: 2020-06-30
Attending: INTERNAL MEDICINE | Admitting: INTERNAL MEDICINE
Payer: MEDICARE

## 2020-06-20 VITALS
HEIGHT: 64 IN | SYSTOLIC BLOOD PRESSURE: 169 MMHG | RESPIRATION RATE: 20 BRPM | HEART RATE: 66 BPM | DIASTOLIC BLOOD PRESSURE: 73 MMHG | OXYGEN SATURATION: 100 % | WEIGHT: 240.08 LBS | TEMPERATURE: 99 F

## 2020-06-20 DIAGNOSIS — Z95.4 PRESENCE OF OTHER HEART-VALVE REPLACEMENT: Chronic | ICD-10-CM

## 2020-06-20 DIAGNOSIS — D50.8 OTHER IRON DEFICIENCY ANEMIAS: ICD-10-CM

## 2020-06-20 DIAGNOSIS — M10.9 GOUT, UNSPECIFIED: ICD-10-CM

## 2020-06-20 DIAGNOSIS — I10 ESSENTIAL (PRIMARY) HYPERTENSION: ICD-10-CM

## 2020-06-20 DIAGNOSIS — I48.11 LONGSTANDING PERSISTENT ATRIAL FIBRILLATION: ICD-10-CM

## 2020-06-20 DIAGNOSIS — I50.23 ACUTE ON CHRONIC SYSTOLIC (CONGESTIVE) HEART FAILURE: ICD-10-CM

## 2020-06-20 DIAGNOSIS — J44.1 CHRONIC OBSTRUCTIVE PULMONARY DISEASE WITH (ACUTE) EXACERBATION: ICD-10-CM

## 2020-06-20 LAB
ALBUMIN SERPL ELPH-MCNC: 3.3 G/DL — SIGNIFICANT CHANGE UP (ref 3.3–5)
ALP SERPL-CCNC: 116 U/L — SIGNIFICANT CHANGE UP (ref 40–120)
ALT FLD-CCNC: 19 U/L — SIGNIFICANT CHANGE UP (ref 12–78)
ANION GAP SERPL CALC-SCNC: 1 MMOL/L — LOW (ref 5–17)
APTT BLD: 44.3 SEC — HIGH (ref 27.5–36.3)
AST SERPL-CCNC: 18 U/L — SIGNIFICANT CHANGE UP (ref 15–37)
BASE EXCESS BLDA CALC-SCNC: 12.5 MMOL/L — HIGH (ref -2–2)
BASOPHILS # BLD AUTO: 0.03 K/UL — SIGNIFICANT CHANGE UP (ref 0–0.2)
BASOPHILS NFR BLD AUTO: 0.5 % — SIGNIFICANT CHANGE UP (ref 0–2)
BILIRUB SERPL-MCNC: 0.4 MG/DL — SIGNIFICANT CHANGE UP (ref 0.2–1.2)
BLD GP AB SCN SERPL QL: SIGNIFICANT CHANGE UP
BLOOD GAS COMMENTS: SIGNIFICANT CHANGE UP
BLOOD GAS COMMENTS: SIGNIFICANT CHANGE UP
BLOOD GAS SOURCE: SIGNIFICANT CHANGE UP
BUN SERPL-MCNC: 39 MG/DL — HIGH (ref 7–23)
CALCIUM SERPL-MCNC: 9 MG/DL — SIGNIFICANT CHANGE UP (ref 8.5–10.1)
CHLORIDE SERPL-SCNC: 97 MMOL/L — SIGNIFICANT CHANGE UP (ref 96–108)
CO2 SERPL-SCNC: 39 MMOL/L — HIGH (ref 22–31)
CREAT SERPL-MCNC: 1.42 MG/DL — HIGH (ref 0.5–1.3)
DAT IGG-SP REAG RBC-IMP: ABNORMAL
DIR ANTIGLOB POLYSPECIFIC INTERPRETATION: ABNORMAL
EOSINOPHIL # BLD AUTO: 0.05 K/UL — SIGNIFICANT CHANGE UP (ref 0–0.5)
EOSINOPHIL NFR BLD AUTO: 0.9 % — SIGNIFICANT CHANGE UP (ref 0–6)
GLUCOSE SERPL-MCNC: 106 MG/DL — HIGH (ref 70–99)
HCO3 BLDA-SCNC: 38 MMOL/L — HIGH (ref 21–29)
HCT VFR BLD CALC: 23.9 % — LOW (ref 34.5–45)
HGB BLD-MCNC: 6.3 G/DL — CRITICAL LOW (ref 11.5–15.5)
HOROWITZ INDEX BLDA+IHG-RTO: 0.28 — SIGNIFICANT CHANGE UP
IMM GRANULOCYTES NFR BLD AUTO: 0.4 % — SIGNIFICANT CHANGE UP (ref 0–1.5)
INR BLD: 1.86 RATIO — HIGH (ref 0.88–1.16)
INR BLD: 1.9 RATIO — HIGH (ref 0.88–1.16)
LYMPHOCYTES # BLD AUTO: 1.06 K/UL — SIGNIFICANT CHANGE UP (ref 1–3.3)
LYMPHOCYTES # BLD AUTO: 18.9 % — SIGNIFICANT CHANGE UP (ref 13–44)
MAGNESIUM SERPL-MCNC: 2.2 MG/DL — SIGNIFICANT CHANGE UP (ref 1.6–2.6)
MCHC RBC-ENTMCNC: 20.3 PG — LOW (ref 27–34)
MCHC RBC-ENTMCNC: 26.4 GM/DL — LOW (ref 32–36)
MCV RBC AUTO: 77.1 FL — LOW (ref 80–100)
MONOCYTES # BLD AUTO: 0.63 K/UL — SIGNIFICANT CHANGE UP (ref 0–0.9)
MONOCYTES NFR BLD AUTO: 11.2 % — SIGNIFICANT CHANGE UP (ref 2–14)
NEUTROPHILS # BLD AUTO: 3.82 K/UL — SIGNIFICANT CHANGE UP (ref 1.8–7.4)
NEUTROPHILS NFR BLD AUTO: 68.1 % — SIGNIFICANT CHANGE UP (ref 43–77)
NRBC # BLD: 0 /100 WBCS — SIGNIFICANT CHANGE UP (ref 0–0)
NT-PROBNP SERPL-SCNC: 1844 PG/ML — HIGH (ref 0–125)
OB PNL STL: NEGATIVE — SIGNIFICANT CHANGE UP
PCO2 BLDA: 55 MMHG — HIGH (ref 32–46)
PH BLD: 7.45 — SIGNIFICANT CHANGE UP (ref 7.35–7.45)
PLATELET # BLD AUTO: 394 K/UL — SIGNIFICANT CHANGE UP (ref 150–400)
PO2 BLDA: 88 MMHG — SIGNIFICANT CHANGE UP (ref 74–108)
POTASSIUM SERPL-MCNC: 4.2 MMOL/L — SIGNIFICANT CHANGE UP (ref 3.5–5.3)
POTASSIUM SERPL-SCNC: 4.2 MMOL/L — SIGNIFICANT CHANGE UP (ref 3.5–5.3)
PROT SERPL-MCNC: 9.3 GM/DL — HIGH (ref 6–8.3)
PROTHROM AB SERPL-ACNC: 21.1 SEC — HIGH (ref 10–12.9)
PROTHROM AB SERPL-ACNC: 21.5 SEC — HIGH (ref 10–12.9)
RBC # BLD: 3.1 M/UL — LOW (ref 3.8–5.2)
RBC # FLD: 20 % — HIGH (ref 10.3–14.5)
SAO2 % BLDA: 96 % — SIGNIFICANT CHANGE UP (ref 92–96)
SARS-COV-2 RNA SPEC QL NAA+PROBE: SIGNIFICANT CHANGE UP
SODIUM SERPL-SCNC: 137 MMOL/L — SIGNIFICANT CHANGE UP (ref 135–145)
TROPONIN I SERPL-MCNC: <.015 NG/ML — SIGNIFICANT CHANGE UP (ref 0.01–0.04)
WBC # BLD: 5.61 K/UL — SIGNIFICANT CHANGE UP (ref 3.8–10.5)
WBC # FLD AUTO: 5.61 K/UL — SIGNIFICANT CHANGE UP (ref 3.8–10.5)

## 2020-06-20 PROCEDURE — 99285 EMERGENCY DEPT VISIT HI MDM: CPT

## 2020-06-20 PROCEDURE — 93010 ELECTROCARDIOGRAM REPORT: CPT

## 2020-06-20 PROCEDURE — 71045 X-RAY EXAM CHEST 1 VIEW: CPT | Mod: 26

## 2020-06-20 PROCEDURE — 86077 PHYS BLOOD BANK SERV XMATCH: CPT

## 2020-06-20 RX ORDER — FUROSEMIDE 40 MG
40 TABLET ORAL ONCE
Refills: 0 | Status: COMPLETED | OUTPATIENT
Start: 2020-06-20 | End: 2020-06-20

## 2020-06-20 RX ORDER — PANTOPRAZOLE SODIUM 20 MG/1
40 TABLET, DELAYED RELEASE ORAL
Refills: 0 | Status: DISCONTINUED | OUTPATIENT
Start: 2020-06-20 | End: 2020-06-30

## 2020-06-20 RX ORDER — WARFARIN SODIUM 2.5 MG/1
5 TABLET ORAL ONCE
Refills: 0 | Status: COMPLETED | OUTPATIENT
Start: 2020-06-20 | End: 2020-06-20

## 2020-06-20 RX ORDER — LIDOCAINE 4 G/100G
1 CREAM TOPICAL DAILY
Refills: 0 | Status: DISCONTINUED | OUTPATIENT
Start: 2020-06-20 | End: 2020-06-30

## 2020-06-20 RX ORDER — SIMVASTATIN 20 MG/1
5 TABLET, FILM COATED ORAL AT BEDTIME
Refills: 0 | Status: DISCONTINUED | OUTPATIENT
Start: 2020-06-20 | End: 2020-06-30

## 2020-06-20 RX ORDER — ALLOPURINOL 300 MG
100 TABLET ORAL DAILY
Refills: 0 | Status: DISCONTINUED | OUTPATIENT
Start: 2020-06-20 | End: 2020-06-30

## 2020-06-20 RX ORDER — IPRATROPIUM/ALBUTEROL SULFATE 18-103MCG
3 AEROSOL WITH ADAPTER (GRAM) INHALATION
Refills: 0 | Status: COMPLETED | OUTPATIENT
Start: 2020-06-20 | End: 2020-06-20

## 2020-06-20 RX ORDER — DIGOXIN 250 MCG
0.12 TABLET ORAL DAILY
Refills: 0 | Status: DISCONTINUED | OUTPATIENT
Start: 2020-06-20 | End: 2020-06-30

## 2020-06-20 RX ORDER — DIGOXIN 250 MCG
0.12 TABLET ORAL DAILY
Refills: 0 | Status: DISCONTINUED | OUTPATIENT
Start: 2020-06-20 | End: 2020-06-20

## 2020-06-20 RX ORDER — METOPROLOL TARTRATE 50 MG
25 TABLET ORAL DAILY
Refills: 0 | Status: DISCONTINUED | OUTPATIENT
Start: 2020-06-20 | End: 2020-06-22

## 2020-06-20 RX ORDER — FUROSEMIDE 40 MG
40 TABLET ORAL
Refills: 0 | Status: DISCONTINUED | OUTPATIENT
Start: 2020-06-20 | End: 2020-06-25

## 2020-06-20 RX ORDER — LISINOPRIL 2.5 MG/1
20 TABLET ORAL DAILY
Refills: 0 | Status: DISCONTINUED | OUTPATIENT
Start: 2020-06-20 | End: 2020-06-20

## 2020-06-20 RX ORDER — IPRATROPIUM/ALBUTEROL SULFATE 18-103MCG
3 AEROSOL WITH ADAPTER (GRAM) INHALATION EVERY 6 HOURS
Refills: 0 | Status: DISCONTINUED | OUTPATIENT
Start: 2020-06-20 | End: 2020-06-30

## 2020-06-20 RX ORDER — LISINOPRIL 2.5 MG/1
20 TABLET ORAL DAILY
Refills: 0 | Status: DISCONTINUED | OUTPATIENT
Start: 2020-06-20 | End: 2020-06-22

## 2020-06-20 RX ADMIN — Medication 40 MILLIGRAM(S): at 20:08

## 2020-06-20 RX ADMIN — WARFARIN SODIUM 5 MILLIGRAM(S): 2.5 TABLET ORAL at 21:58

## 2020-06-20 RX ADMIN — Medication 100 MILLIGRAM(S): at 18:12

## 2020-06-20 RX ADMIN — Medication 3 MILLILITER(S): at 15:13

## 2020-06-20 RX ADMIN — LISINOPRIL 20 MILLIGRAM(S): 2.5 TABLET ORAL at 18:12

## 2020-06-20 RX ADMIN — SIMVASTATIN 5 MILLIGRAM(S): 20 TABLET, FILM COATED ORAL at 21:58

## 2020-06-20 RX ADMIN — Medication 125 MILLIGRAM(S): at 14:09

## 2020-06-20 RX ADMIN — Medication 40 MILLIGRAM(S): at 15:36

## 2020-06-20 RX ADMIN — Medication 3 MILLILITER(S): at 19:01

## 2020-06-20 RX ADMIN — Medication 25 MILLIGRAM(S): at 18:12

## 2020-06-20 RX ADMIN — Medication 0.12 MILLIGRAM(S): at 18:12

## 2020-06-20 RX ADMIN — Medication 3 MILLILITER(S): at 15:36

## 2020-06-20 NOTE — ED PROVIDER NOTE - CARE PLAN
Principal Discharge DX:	COPD exacerbation  Secondary Diagnosis:	CHF (congestive heart failure)  Secondary Diagnosis:	Anemia

## 2020-06-20 NOTE — H&P ADULT - NSHPLABSRESULTS_GEN_ALL_CORE
6.3    5.61  )-----------( 394      ( 20 Jun 2020 13:59 )             23.9     06-20    137  |  97  |  39<H>  ----------------------------<  106<H>  4.2   |  39<H>  |  1.42<H>    Ca    9.0      20 Jun 2020 13:59  Mg     2.2     06-20    TPro  9.3<H>  /  Alb  3.3  /  TBili  0.4  /  DBili  x   /  AST  18  /  ALT  19  /  AlkPhos  116  06-20    PT/INR - ( 20 Jun 2020 13:59 )   PT: 21.5 sec;   INR: 1.90 ratio         PTT - ( 20 Jun 2020 13:59 )  PTT:44.3 sec    CAPILLARY BLOOD GLUCOSE        ABG - ( 20 Jun 2020 15:55 )  pH, Arterial: x     pH, Blood: 7.45  /  pCO2: 55    /  pO2: 88    / HCO3: 38    / Base Excess: 12.5  /  SaO2: 96                CARDIAC MARKERS ( 20 Jun 2020 13:59 )  <.015 ng/mL / x     / x     / x     / x            Urine Culture:      Blood Culture:    < from: Xray Chest 1 View-PORTABLE IMMEDIATE (06.20.20 @ 14:52) >    IMPRESSION: Cardiomegaly. Perihilar interstitial lung ill-defined opacities/infiltrates. Constellation of findings suggestive of CHF..    < end of copied text >

## 2020-06-20 NOTE — ED ADULT NURSE NOTE - OBJECTIVE STATEMENT
received pt lying on stretcher alert and oriented x4 c/o brought by ems for sob  x one weeks denies any fever or cough . history of COPD. pt uses 3liter home O2.

## 2020-06-20 NOTE — ED PROVIDER NOTE - OBJECTIVE STATEMENT
68 y/o CHF, COPD on home O2 3L, HTN, Gout, and pulmonary HTN, afib, presents with sob x 2 weeks, worse in past week. denies fever, body aches, cough, cp. denies black/bloody BM.     No fever/chills, No photophobia/eye pain/changes in vision, No ear pain/sore throat/dysphagia, No chest pain/palpitations, +SOB, no cough, no wheeze/stridor, No abdominal pain, No N/V/D, no dysuria/frequency/discharge, No neck/back pain, no rash, no changes in neurological status/function.

## 2020-06-20 NOTE — ED ADULT NURSE NOTE - NSIMPLEMENTINTERV_GEN_ALL_ED
Implemented All Fall with Harm Risk Interventions:  [place] to call system. Call bell, personal items and telephone within reach. Instruct patient to call for assistance. Room bathroom lighting operational. Non-slip footwear when patient is off stretcher. Physically safe environment: no spills, clutter or unnecessary equipment. Stretcher in lowest position, wheels locked, appropriate side rails in place. Provide visual cue, wrist band, yellow gown, etc. Monitor gait and stability. Monitor for mental status changes and reorient to person, place, and time. Review medications for side effects contributing to fall risk. Reinforce activity limits and safety measures with patient and family. Provide visual clues: red socks.

## 2020-06-20 NOTE — ED PROVIDER NOTE - PHYSICAL EXAMINATION
Gen: Alert, Well appearing. NAD    Head: NC, AT, PERRL, normal lids/conjunctiva   ENT: Bilateral TM WNL, patent oropharynx without erythema/exudate, uvula midline  Neck: supple, no tenderness/meningismus  Pulm: + diffuse wheeze  CV: RRR, no M/R/G, +dist pulses   Abd: soft, NT/ND, +BS, no guarding/rebound tenderness  Mskel: no edema/erythema/cyanosis   Skin: no rash, no bruising  Neuro: AAOx3, no sensory/motor deficits, CN 2-12 intact

## 2020-06-20 NOTE — H&P ADULT - ASSESSMENT
IMPROVE VTE Individual Risk Assessment    RISK                                                                Points    [  ] Previous VTE                                                  3    [  ] Thrombophilia                                               2    [  ] Lower limb paralysis                                      2        (unable to hold up >15 seconds)      [  ] Current Cancer                                              2         (within 6 months)    [  ] Immobilization > 24 hrs                                1    [  ] ICU/CCU stay > 24 hours                              1    [  ] Age > 60                                                      1  2  IMPROVE VTE Score _________    IMPROVE Score 0-1: Low Risk, No VTE prophylaxis required for most patients, encourage ambulation.   IMPROVE Score 2-3: At risk, pharmacologic VTE prophylaxis is indicated for most patients (in the absence of a contraindication)  IMPROVE Score > or = 4: High Risk, pharmacologic VTE prophylaxis is indicated for most patients (in the absence of a contraindication)  On Coumadin    70 y/o CHF, COPD on home O2 3L, HTN, Gout, and pulmonary HTN, afib, presents with sob x 2 weeks, worse in past week. denies fever, body aches, cough, cp. denies black/bloody BM. Stool Occult blood negative.

## 2020-06-20 NOTE — ED PROVIDER NOTE - CLINICAL SUMMARY MEDICAL DECISION MAKING FREE TEXT BOX
Pt wheezing improved. some chf and copd. for diuresis and slow transfusion. d/w dr hernandez for admission.

## 2020-06-20 NOTE — H&P ADULT - NSHPPHYSICALEXAM_GEN_ALL_CORE
GENERAL: NAD, well-groomed,Obese  HEAD:  Atraumatic, Normocephalic  EYES: EOMI, PERRL, conjunctiva and sclera clear  ENMT:  Moist mucous membranes, Good dentition, No lesions  NECK: Supple, No JVD, Normal thyroid  NERVOUS SYSTEM:  Alert & Oriented X3, Good concentration; Motor Strength 4/5 B/L upper and lower extremities;   CHEST/LUNG: Clear to percussion bilaterally; No rales, rhonchi, wheezing, or rubs  HEART: Regular rate and rhythm; No murmurs, rubs, or gallops  ABDOMEN: Soft, Nontender, Nondistended; Bowel sounds present, Obese  EXTREMITIES:  2+ Peripheral Pulses, No clubbing, cyanosis, or edema  LYMPH: No lymphadenopathy noted  SKIN: No rashes or lesions      Vital Signs Last 24 Hrs  T(C): 37.2 (20 Jun 2020 13:19), Max: 37.2 (20 Jun 2020 13:19)  T(F): 98.9 (20 Jun 2020 13:19), Max: 98.9 (20 Jun 2020 13:19)  HR: 68 (20 Jun 2020 14:11) (66 - 68)  BP: 173/66 (20 Jun 2020 14:11) (169/73 - 173/66)  BP(mean): --  RR: 20 (20 Jun 2020 13:19) (20 - 20)  SpO2: 98% (20 Jun 2020 14:11) (98% - 100%)

## 2020-06-20 NOTE — H&P ADULT - HISTORY OF PRESENT ILLNESS
· HPI: 70 y/o CHF, COPD on home O2 3L, HTN, Gout, and pulmonary HTN, afib, presents with sob x 2 weeks, worse in past week. denies fever, body aches, cough, cp. denies black/bloody BM.    No fever/chills, No photophobia/eye pain/changes in vision, No ear pain/sore throat/dysphagia, No chest pain/palpitations, +SOB, no cough, no wheeze/stridor, No abdominal pain, No N/V/D, no dysuria/frequency/discharge, No neck/back pain, no rash, no changes in neurological status/function.

## 2020-06-21 LAB
ALLERGY+IMMUNOLOGY DIAG STUDY NOTE: SIGNIFICANT CHANGE UP
ANION GAP SERPL CALC-SCNC: 4 MMOL/L — LOW (ref 5–17)
APTT BLD: 39.6 SEC — HIGH (ref 27.5–36.3)
BUN SERPL-MCNC: 48 MG/DL — HIGH (ref 7–23)
CALCIUM SERPL-MCNC: 9 MG/DL — SIGNIFICANT CHANGE UP (ref 8.5–10.1)
CHLORIDE SERPL-SCNC: 95 MMOL/L — LOW (ref 96–108)
CO2 SERPL-SCNC: 37 MMOL/L — HIGH (ref 22–31)
CREAT SERPL-MCNC: 1.39 MG/DL — HIGH (ref 0.5–1.3)
GLUCOSE SERPL-MCNC: 133 MG/DL — HIGH (ref 70–99)
HCT VFR BLD CALC: 23.1 % — LOW (ref 34.5–45)
HGB BLD-MCNC: 6.2 G/DL — CRITICAL LOW (ref 11.5–15.5)
IAT COMP-SP REAG SERPL QL: SIGNIFICANT CHANGE UP
INR BLD: 1.68 RATIO — HIGH (ref 0.88–1.16)
MAGNESIUM SERPL-MCNC: 2.4 MG/DL — SIGNIFICANT CHANGE UP (ref 1.6–2.6)
MCHC RBC-ENTMCNC: 20.6 PG — LOW (ref 27–34)
MCHC RBC-ENTMCNC: 26.8 GM/DL — LOW (ref 32–36)
MCV RBC AUTO: 76.7 FL — LOW (ref 80–100)
NRBC # BLD: 0 /100 WBCS — SIGNIFICANT CHANGE UP (ref 0–0)
PLATELET # BLD AUTO: 439 K/UL — HIGH (ref 150–400)
POTASSIUM SERPL-MCNC: 4.7 MMOL/L — SIGNIFICANT CHANGE UP (ref 3.5–5.3)
POTASSIUM SERPL-SCNC: 4.7 MMOL/L — SIGNIFICANT CHANGE UP (ref 3.5–5.3)
PROTHROM AB SERPL-ACNC: 19.1 SEC — HIGH (ref 10–12.9)
RBC # BLD: 3.01 M/UL — LOW (ref 3.8–5.2)
RBC # FLD: 20 % — HIGH (ref 10.3–14.5)
SODIUM SERPL-SCNC: 136 MMOL/L — SIGNIFICANT CHANGE UP (ref 135–145)
WBC # BLD: 7.01 K/UL — SIGNIFICANT CHANGE UP (ref 3.8–10.5)
WBC # FLD AUTO: 7.01 K/UL — SIGNIFICANT CHANGE UP (ref 3.8–10.5)

## 2020-06-21 PROCEDURE — 93306 TTE W/DOPPLER COMPLETE: CPT | Mod: 26

## 2020-06-21 RX ORDER — WARFARIN SODIUM 2.5 MG/1
5 TABLET ORAL ONCE
Refills: 0 | Status: COMPLETED | OUTPATIENT
Start: 2020-06-21 | End: 2020-06-21

## 2020-06-21 RX ORDER — ACETAMINOPHEN 500 MG
325 TABLET ORAL ONCE
Refills: 0 | Status: COMPLETED | OUTPATIENT
Start: 2020-06-21 | End: 2020-06-21

## 2020-06-21 RX ADMIN — LISINOPRIL 20 MILLIGRAM(S): 2.5 TABLET ORAL at 06:01

## 2020-06-21 RX ADMIN — PANTOPRAZOLE SODIUM 40 MILLIGRAM(S): 20 TABLET, DELAYED RELEASE ORAL at 06:01

## 2020-06-21 RX ADMIN — Medication 3 MILLILITER(S): at 17:30

## 2020-06-21 RX ADMIN — Medication 40 MILLIGRAM(S): at 17:04

## 2020-06-21 RX ADMIN — Medication 0.12 MILLIGRAM(S): at 06:01

## 2020-06-21 RX ADMIN — SIMVASTATIN 5 MILLIGRAM(S): 20 TABLET, FILM COATED ORAL at 21:24

## 2020-06-21 RX ADMIN — Medication 3 MILLILITER(S): at 11:21

## 2020-06-21 RX ADMIN — Medication 40 MILLIGRAM(S): at 06:01

## 2020-06-21 RX ADMIN — Medication 325 MILLIGRAM(S): at 22:41

## 2020-06-21 RX ADMIN — LIDOCAINE 1 PATCH: 4 CREAM TOPICAL at 11:28

## 2020-06-21 RX ADMIN — Medication 25 MILLIGRAM(S): at 06:01

## 2020-06-21 RX ADMIN — Medication 3 MILLILITER(S): at 05:12

## 2020-06-21 RX ADMIN — Medication 3 MILLILITER(S): at 00:06

## 2020-06-21 RX ADMIN — LIDOCAINE 1 PATCH: 4 CREAM TOPICAL at 17:28

## 2020-06-21 RX ADMIN — WARFARIN SODIUM 5 MILLIGRAM(S): 2.5 TABLET ORAL at 21:24

## 2020-06-21 RX ADMIN — Medication 100 MILLIGRAM(S): at 11:28

## 2020-06-21 NOTE — PROVIDER CONTACT NOTE (CRITICAL VALUE NOTIFICATION) - BACKGROUND
Pt admitted for COPD exacerbation and CHF & anemia. Pt admitted for COPD exacerbation and CHF & anemia. As per night FREDDY Dumont, Pt due to receive 1 PRBC but since pt has specific antibodies, blood needs special preparation. Blood bank will notify when blood is ready for pickup. Pt admitted for COPD exacerbation and CHF & anemia. As per night RN Pilo Dumont, Pt due to receive 1 PRBC but since pt has specific antibodies, blood needs special preparation. Blood bank will notify when blood is ready for pickup.

## 2020-06-21 NOTE — PROGRESS NOTE ADULT - SUBJECTIVE AND OBJECTIVE BOX
Patient is a 69y old  Female who presents with a chief complaint of VILLA (20 Jun 2020 17:22)      INTERVAL HPI/OVERNIGHT EVENTS:    MEDICATIONS  (STANDING):  albuterol/ipratropium for Nebulization 3 milliLiter(s) Nebulizer every 6 hours  allopurinol 100 milliGRAM(s) Oral daily  digoxin     Tablet 0.125 milliGRAM(s) Oral daily  furosemide    Tablet 40 milliGRAM(s) Oral two times a day  lidocaine   Patch 1 Patch Transdermal daily  lisinopril 20 milliGRAM(s) Oral daily  metoprolol succinate ER 25 milliGRAM(s) Oral daily  pantoprazole    Tablet 40 milliGRAM(s) Oral before breakfast  simvastatin 5 milliGRAM(s) Oral at bedtime    MEDICATIONS  (PRN):      Allergies    No Known Allergies    Intolerances        REVIEW OF SYSTEMS:  CONSTITUTIONAL: No fever, weight loss, or fatigue  EYES: No eye pain, visual disturbances, or discharge  ENMT:  No difficulty hearing, tinnitus, vertigo; No sinus or throat pain  NECK: No pain or stiffness  BREASTS: No pain, masses, or nipple discharge  RESPIRATORY: No cough, wheezing, chills or hemoptysis; No shortness of breath  CARDIOVASCULAR: No chest pain, palpitations, dizziness, or leg swelling  GASTROINTESTINAL: No abdominal or epigastric pain. No nausea, vomiting, or hematemesis; No diarrhea or constipation. No melena or hematochezia.  GENITOURINARY: No dysuria, frequency, hematuria, or incontinence  NEUROLOGICAL: No headaches, memory loss, loss of strength, numbness, or tremors  SKIN: No itching, burning, rashes, or lesions   LYMPH NODES: No enlarged glands  ENDOCRINE: No heat or cold intolerance; No hair loss  MUSCULOSKELETAL: No joint pain or swelling; No muscle, back, or extremity pain  PSYCHIATRIC: No depression, anxiety, mood swings, or difficulty sleeping  HEME/LYMPH: No easy bruising, or bleeding gums  ALLERGY AND IMMUNOLOGIC: No hives or eczema    Vital Signs Last 24 Hrs  T(C): 36.9 (21 Jun 2020 04:57), Max: 37.2 (20 Jun 2020 13:19)  T(F): 98.4 (21 Jun 2020 04:57), Max: 98.9 (20 Jun 2020 13:19)  HR: 68 (21 Jun 2020 05:14) (66 - 89)  BP: 128/61 (21 Jun 2020 04:57) (128/61 - 174/78)  BP(mean): --  RR: 18 (21 Jun 2020 04:57) (18 - 25)  SpO2: 98% (21 Jun 2020 05:14) (95% - 100%)    PHYSICAL EXAM:  GENERAL: NAD, well-groomed, Obese  HEAD:  Atraumatic, Normocephalic  EYES: EOMI, PERRL, conjunctiva and sclera clear  ENMT:  Moist mucous membranes, Good dentition, No lesions  NECK: Supple, No JVD, Normal thyroid  NERVOUS SYSTEM:  Alert & Oriented X 3, Good concentration; Motor Strength 5/5 B/L upper and lower extremities;   CHEST/LUNG: Clear to percussion bilaterally; No rales, rhonchi, wheezing, or rubs  HEART: Regular rate and rhythm; No murmurs, rubs, or gallops  ABDOMEN: Soft, Nontender, Nondistended; Bowel sounds present  EXTREMITIES:  2+ Peripheral Pulses, No clubbing, cyanosis, or edema  LYMPH: No lymphadenopathy noted  SKIN: No rashes or lesions    LABS:                        6.2    7.01  )-----------( 439      ( 21 Jun 2020 06:56 )             23.1     06-21    136  |  95<L>  |  48<H>  ----------------------------<  133<H>  4.7   |  37<H>  |  1.39<H>    Ca    9.0      21 Jun 2020 06:56  Mg     2.4     06-21    TPro  9.3<H>  /  Alb  3.3  /  TBili  0.4  /  DBili  x   /  AST  18  /  ALT  19  /  AlkPhos  116  06-20    PT/INR - ( 21 Jun 2020 06:56 )   PT: 19.1 sec;   INR: 1.68 ratio         PTT - ( 21 Jun 2020 06:56 )  PTT:39.6 sec    CAPILLARY BLOOD GLUCOSE        ABG - ( 20 Jun 2020 15:55 )  pH, Arterial: x     pH, Blood: 7.45  /  pCO2: 55    /  pO2: 88    / HCO3: 38    / Base Excess: 12.5  /  SaO2: 96                CARDIAC MARKERS ( 20 Jun 2020 13:59 )  <.015 ng/mL / x     / x     / x     / x                    RADIOLOGY & ADDITIONAL TESTS:  < from: Xray Chest 1 View-PORTABLE IMMEDIATE (06.20.20 @ 14:52) >  FINDINGS:   There is cardiomegaly, mild vascular congestion and perihilar interstitial infiltrates without gross effusion. Constellation of findings suggestive of CHF. Superimposed infectious pneumonia should be considered.  Status post cardiac valve replacement.  Trachea midline. No hilar mass.   Visualized osseous structures are intact.      < end of copied text >    Imaging Personally Reviewed:  [ ] YES  [ ] NO    Consultant(s) Notes Reviewed:  [ ] YES  [ ] NO    Care Discussed with Consultants/Other Providers [ ] YES  [ ] NO    PROBLEMS:  COPD EXACERBATION;  CONGESTIVE HEART FAILURE;  ANEMIA  SHORTNESS OF BREATH  COPD exacerbation  Longstanding persistent atrial fibrillation  Essential hypertension  Gout  Other iron deficiency anemia  COPD exacerbation  Acute on chronic systolic congestive heart failure      Care discussed with family,         [  ]   yes  [  ]  No    imp:    stable[ ]    unstable[  ]     improving [   ]       unchanged  [  ]                Plans:  Continue present plans  [  ]               New consult [  ]   specialty  .......               order maribell[  ]    test name.                  Discharge Planning  [  ]

## 2020-06-21 NOTE — CONSULT NOTE ADULT - SUBJECTIVE AND OBJECTIVE BOX
Patient is a 69y old  Female who presents with a chief complaint of VILLA (21 Jun 2020 09:04)      HPI: 69 year female with HTN, A fib, Mitral & Tricuspid valve replacement, pHTN, Diastolic CHF, COPD(never smoked but reports 2nd hand exposure from ), Chronic hypoxic hypercarbic Respiratory failure  on home O2, Obesity, MIGUEL(did not tolerate CPAP), pHTN, Gout, Anemia, Renal Insuffiencey  Presented  with sob x 2 weeks, which got  worst  in past week. SOB more with exertion and in supine position, better at rest.  Denies fever, body aches, Significant new cough or sputum production, cp, black/bloody BM.        PAST MEDICAL & SURGICAL HISTORY:  Tricuspid valve replaced  Mitral valve replaced  Gout  HTN (hypertension)  CHF (congestive heart failure)  COPD (chronic obstructive pulmonary disease): on home O2  MIGUEL (obstructive sleep apnea)  Pulmonary hypertension  Atrial fibrillation: S/P Ablation  Tricuspid valve replaced: mechanical  History of mitral valve replacement with mechanical valve    FAMILY HISTORY:  Family history of hypertension (Mother)  Family history of stroke  Family history of hypertension (Father, Sibling)    SOCIAL HISTORY: BMI (kg/m2): 41.3 (06-20 @ 21:06). non smoker, +ve 2nd hand exposre    Allergies  No Known Allergies    MEDICATIONS  (STANDING):  albuterol/ipratropium for Nebulization 3 milliLiter(s) Nebulizer every 6 hours  allopurinol 100 milliGRAM(s) Oral daily  digoxin     Tablet 0.125 milliGRAM(s) Oral daily  furosemide    Tablet 40 milliGRAM(s) Oral two times a day  lidocaine   Patch 1 Patch Transdermal daily  lisinopril 20 milliGRAM(s) Oral daily  metoprolol succinate ER 25 milliGRAM(s) Oral daily  pantoprazole    Tablet 40 milliGRAM(s) Oral before breakfast  simvastatin 5 milliGRAM(s) Oral at bedtime    REVIEW OF SYSTEMS:    Constitutional:            No fever, weight loss or fatigue  HEENT:                         No difficulty hearing, tinnitus, vertigo; No sinus or throat pain  Respiratory:                   sob  Cardiovascular:           No chest pain, palpitations  Gastrointestinal:        No abdominal or epigastric pain. No N/V/diarrhea or hematemesis. Obese  Genitourinary:            No dysuria, frequency, hematuria or incontinence  SKIN:                             no rash  Musculoskeletal:        No joint pain or swelling  Extremities:                No swelling  Neurological:              No headaches  Psychiatric:                 No depression, anxiety    MACRA & MIPS:  Vaccines - Influenza: no and Pneumovax: no  Tobacco:  no  Blood Pressure Screening / Control of: 174/78  Current Medications Reviewed: yes    Vital Signs Last 24 Hrs  T(C): 36.8 (21 Jun 2020 11:09), Max: 37.2 (20 Jun 2020 13:19)  T(F): 98.2 (21 Jun 2020 11:09), Max: 98.9 (20 Jun 2020 13:19)  HR: 86 (21 Jun 2020 11:21) (66 - 90)  BP: 152/68 (21 Jun 2020 11:09) (128/61 - 174/78)  BP(mean): --  RR: 18 (21 Jun 2020 11:09) (18 - 25)  SpO2: 98% (21 Jun 2020 11:21) (95% - 100%)    PHYSICAL EXAM:  GEN:         Awake, responsive and comfortable.  HEENT:    Normal.    RESP:        Decreased air entry.  CVS:             Regular rate and rhythm.   ABD:         Soft, non-tender, non-distended;   :             No costovertebral angle tenderness  SKIN:           Warm and dry.  EXTR:            Trace edema  CNS:              Intact sensory and motor function.  PSYCH:        cooperative, no anxiety or depression    LABS:                        6.2    7.01  )-----------( 439      ( 21 Jun 2020 06:56 )             23.1     06-21    136  |  95<L>  |  48<H>  ----------------------------<  133<H>  4.7   |  37<H>  |  1.39<H>    Ca    9.0      21 Jun 2020 06:56  Mg     2.4     06-21    TPro  9.3<H>  /  Alb  3.3  /  TBili  0.4  /  DBili  x   /  AST  18  /  ALT  19  /  AlkPhos  116  06-20    PT/INR - ( 21 Jun 2020 06:56 )   PT: 19.1 sec;   INR: 1.68 ratio       PTT - ( 21 Jun 2020 06:56 )  PTT:39.6 sec  06-20 @ 15:55  pH: 7.45  pCO2: 55  pO2: 88  SaO2: 96    EKG: regular rhythm    RADIOLOGY & ADDITIONAL STUDIES:  < from: Xray Chest 1 View-PORTABLE IMMEDIATE (06.20.20 @ 14:52) >  EXAM:  XR CHEST PORTABLE IMMED 1V                          PROCEDURE DATE:  06/20/2020      INTERPRETATION:  Portable chest radiograph        CLINICAL INFORMATION:   Chest pain    TECHNIQUE:  Portable  AP view of the chest was obtained.    COMPARISON: 11/27/2019 available for review.    FINDINGS:   There is cardiomegaly, mild vascular congestion and perihilar interstitial infiltrates without gross effusion. Constellation of findings suggestive of CHF. Superimposed infectious pneumonia should be considered.  Status post cardiac valve replacement.  Trachea midline. No hilar mass.   Visualized osseous structures are intact.    IMPRESSION: Cardiomegaly. Perihilar interstitial lung ill-defined opacities/infiltrates. Constellation of findings suggestive of CHF..    RENETTA YORK M.D., ATTENDING RADIOLOGIST  This document has been electronically signed. Jun 20 2020  3:00PM      ASSESSMENT AND PLAN:  ·	Acute on chronic hypoxic hypercarbic Respiratory failure.  ·	Acute on chronic diastolic CHF.  ·	Acute COPD exacerbation.  ·	Obesity.  ·	MIGUEL, did not tolerate CPAP.  ·	Atrial fibrillation.  ·	pHTN.  ·	S/P Mitral and Tricuspid Valve replacement.  ·	Suspect GI bleed.  ·	Renal Insuffiencey.    Supplemental O2.  Continue diuretics, follow electrolytes.  Continue Bronchodilators,  Weight reduction.  Echocardiogram Patient is a 69y old  Female who presents with a chief complaint of VILLA (21 Jun 2020 09:04)      HPI: 69 year female with HTN, A fib, Mitral & Tricuspid valve replacement, pHTN, Diastolic CHF, COPD(never smoked but reports 2nd hand exposure from ), Chronic hypoxic hypercarbic Respiratory failure  on home O2, Obesity, MIGUEL(did not tolerate CPAP), pHTN, Gout, Anemia, Renal Insuffiencey  Presented  with sob x 2 weeks, which got  worst  in past week. SOB more with exertion and in supine position, better at rest.  Denies fever, body aches, Significant new cough or sputum production, cp, black/bloody BM.        PAST MEDICAL & SURGICAL HISTORY:  Tricuspid valve replaced  Mitral valve replaced  Gout  HTN (hypertension)  CHF (congestive heart failure)  COPD (chronic obstructive pulmonary disease): on home O2  MIGUEL (obstructive sleep apnea)  Pulmonary hypertension  Atrial fibrillation: S/P Ablation  Tricuspid valve replaced: mechanical  History of mitral valve replacement with mechanical valve    FAMILY HISTORY:  Family history of hypertension (Mother)  Family history of stroke  Family history of hypertension (Father, Sibling)    SOCIAL HISTORY: BMI (kg/m2): 41.3 (06-20 @ 21:06). non smoker, +ve 2nd hand exposre    Allergies  No Known Allergies    MEDICATIONS  (STANDING):  albuterol/ipratropium for Nebulization 3 milliLiter(s) Nebulizer every 6 hours  allopurinol 100 milliGRAM(s) Oral daily  digoxin     Tablet 0.125 milliGRAM(s) Oral daily  furosemide    Tablet 40 milliGRAM(s) Oral two times a day  lidocaine   Patch 1 Patch Transdermal daily  lisinopril 20 milliGRAM(s) Oral daily  metoprolol succinate ER 25 milliGRAM(s) Oral daily  pantoprazole    Tablet 40 milliGRAM(s) Oral before breakfast  simvastatin 5 milliGRAM(s) Oral at bedtime    REVIEW OF SYSTEMS:    Constitutional:            No fever, weight loss or fatigue  HEENT:                         No difficulty hearing, tinnitus, vertigo; No sinus or throat pain  Respiratory:                   sob  Cardiovascular:           No chest pain, palpitations  Gastrointestinal:        No abdominal or epigastric pain. No N/V/diarrhea or hematemesis. Obese  Genitourinary:            No dysuria, frequency, hematuria or incontinence  SKIN:                             no rash  Musculoskeletal:        No joint pain or swelling  Extremities:                No swelling  Neurological:              No headaches  Psychiatric:                 No depression, anxiety    MACRA & MIPS:  Vaccines - Influenza: no and Pneumovax: no  Tobacco:  no  Blood Pressure Screening / Control of: 174/78  Current Medications Reviewed: yes    Vital Signs Last 24 Hrs  T(C): 36.8 (21 Jun 2020 11:09), Max: 37.2 (20 Jun 2020 13:19)  T(F): 98.2 (21 Jun 2020 11:09), Max: 98.9 (20 Jun 2020 13:19)  HR: 86 (21 Jun 2020 11:21) (66 - 90)  BP: 152/68 (21 Jun 2020 11:09) (128/61 - 174/78)  BP(mean): --  RR: 18 (21 Jun 2020 11:09) (18 - 25)  SpO2: 98% (21 Jun 2020 11:21) (95% - 100%)    PHYSICAL EXAM:  GEN:         Awake, responsive and comfortable.  HEENT:    Normal.    RESP:        Decreased air entry.  CVS:             Regular rate and rhythm.   ABD:         Soft, non-tender, non-distended;   :             No costovertebral angle tenderness  SKIN:           Warm and dry.  EXTR:            Trace edema  CNS:              Intact sensory and motor function.  PSYCH:        cooperative, no anxiety or depression    LABS:                        6.2    7.01  )-----------( 439      ( 21 Jun 2020 06:56 )             23.1     06-21    136  |  95<L>  |  48<H>  ----------------------------<  133<H>  4.7   |  37<H>  |  1.39<H>    Ca    9.0      21 Jun 2020 06:56  Mg     2.4     06-21    TPro  9.3<H>  /  Alb  3.3  /  TBili  0.4  /  DBili  x   /  AST  18  /  ALT  19  /  AlkPhos  116  06-20    PT/INR - ( 21 Jun 2020 06:56 )   PT: 19.1 sec;   INR: 1.68 ratio       PTT - ( 21 Jun 2020 06:56 )  PTT:39.6 sec  06-20 @ 15:55  pH: 7.45  pCO2: 55  pO2: 88  SaO2: 96    EKG: regular rhythm    RADIOLOGY & ADDITIONAL STUDIES:  < from: Xray Chest 1 View-PORTABLE IMMEDIATE (06.20.20 @ 14:52) >  EXAM:  XR CHEST PORTABLE IMMED 1V                          PROCEDURE DATE:  06/20/2020      INTERPRETATION:  Portable chest radiograph        CLINICAL INFORMATION:   Chest pain    TECHNIQUE:  Portable  AP view of the chest was obtained.    COMPARISON: 11/27/2019 available for review.    FINDINGS:   There is cardiomegaly, mild vascular congestion and perihilar interstitial infiltrates without gross effusion. Constellation of findings suggestive of CHF. Superimposed infectious pneumonia should be considered.  Status post cardiac valve replacement.  Trachea midline. No hilar mass.   Visualized osseous structures are intact.    IMPRESSION: Cardiomegaly. Perihilar interstitial lung ill-defined opacities/infiltrates. Constellation of findings suggestive of CHF..    RENETTA YORK M.D., ATTENDING RADIOLOGIST  This document has been electronically signed. Jun 20 2020  3:00PM      ASSESSMENT AND PLAN:  ·	Acute on chronic hypoxic hypercarbic Respiratory failure.  ·	Acute on chronic diastolic CHF.  ·	Acute COPD exacerbation.  ·	Obesity.  ·	MIGUEL, did not tolerate CPAP.  ·	Atrial fibrillation.  ·	pHTN.  ·	S/P Mitral and Tricuspid Valve replacement.  ·	Suspect GI bleed.  ·	Renal Insuffiencey.    Supplemental O2.  Continue diuretics, follow electrolytes.  Continue Bronchodilators,  Weight reduction.  Follow Echocardiogram

## 2020-06-21 NOTE — PROGRESS NOTE ADULT - ASSESSMENT
IMPROVE VTE Individual Risk Assessment    RISK                                                                Points    [  ] Previous VTE                                                  3    [  ] Thrombophilia                                               2    [  ] Lower limb paralysis                                      2        (unable to hold up >15 seconds)      [  ] Current Cancer                                              2         (within 6 months)    [  ] Immobilization > 24 hrs                                1    [  ] ICU/CCU stay > 24 hours                              1    [  ] Age > 60                                                      1  2  IMPROVE VTE Score _________    IMPROVE Score 0-1: Low Risk, No VTE prophylaxis required for most patients, encourage ambulation.   IMPROVE Score 2-3: At risk, pharmacologic VTE prophylaxis is indicated for most patients (in the absence of a contraindication)  IMPROVE Score > or = 4: High Risk, pharmacologic VTE prophylaxis is indicated for most patients (in the absence of a contraindication)  On Coumadin    70 y/o CHF, COPD on home O2 3L, HTN, Gout, and pulmonary HTN, afib, presents with sob x 2 weeks, worse in past week. denies fever, body aches, cough, cp. denies black/bloody BM. Stool Occult blood negative.  06/21/2020 : Pt. alert. Breathing improved. Hb 6.2, Has Antibodies, having difficulty with type and X-match. Continue present.

## 2020-06-22 ENCOUNTER — TRANSCRIPTION ENCOUNTER (OUTPATIENT)
Age: 70
End: 2020-06-22

## 2020-06-22 LAB
ANION GAP SERPL CALC-SCNC: 3 MMOL/L — LOW (ref 5–17)
BUN SERPL-MCNC: 59 MG/DL — HIGH (ref 7–23)
CALCIUM SERPL-MCNC: 9.3 MG/DL — SIGNIFICANT CHANGE UP (ref 8.5–10.1)
CHLORIDE SERPL-SCNC: 96 MMOL/L — SIGNIFICANT CHANGE UP (ref 96–108)
CO2 SERPL-SCNC: 37 MMOL/L — HIGH (ref 22–31)
CREAT SERPL-MCNC: 1.6 MG/DL — HIGH (ref 0.5–1.3)
GLUCOSE SERPL-MCNC: 104 MG/DL — HIGH (ref 70–99)
HCT VFR BLD CALC: 26.3 % — LOW (ref 34.5–45)
HGB BLD-MCNC: 7 G/DL — CRITICAL LOW (ref 11.5–15.5)
INR BLD: 1.89 RATIO — HIGH (ref 0.88–1.16)
MCHC RBC-ENTMCNC: 21.2 PG — LOW (ref 27–34)
MCHC RBC-ENTMCNC: 26.6 GM/DL — LOW (ref 32–36)
MCV RBC AUTO: 79.7 FL — LOW (ref 80–100)
NRBC # BLD: 0 /100 WBCS — SIGNIFICANT CHANGE UP (ref 0–0)
PLATELET # BLD AUTO: 433 K/UL — HIGH (ref 150–400)
POTASSIUM SERPL-MCNC: 4.2 MMOL/L — SIGNIFICANT CHANGE UP (ref 3.5–5.3)
POTASSIUM SERPL-SCNC: 4.2 MMOL/L — SIGNIFICANT CHANGE UP (ref 3.5–5.3)
PROTHROM AB SERPL-ACNC: 21.4 SEC — HIGH (ref 10–12.9)
RBC # BLD: 3.3 M/UL — LOW (ref 3.8–5.2)
RBC # FLD: 19.6 % — HIGH (ref 10.3–14.5)
SODIUM SERPL-SCNC: 136 MMOL/L — SIGNIFICANT CHANGE UP (ref 135–145)
WBC # BLD: 10.74 K/UL — HIGH (ref 3.8–10.5)
WBC # FLD AUTO: 10.74 K/UL — HIGH (ref 3.8–10.5)

## 2020-06-22 RX ORDER — WARFARIN SODIUM 2.5 MG/1
5 TABLET ORAL ONCE
Refills: 0 | Status: COMPLETED | OUTPATIENT
Start: 2020-06-22 | End: 2020-06-22

## 2020-06-22 RX ORDER — METOPROLOL TARTRATE 50 MG
25 TABLET ORAL DAILY
Refills: 0 | Status: DISCONTINUED | OUTPATIENT
Start: 2020-06-22 | End: 2020-06-30

## 2020-06-22 RX ORDER — LISINOPRIL 2.5 MG/1
20 TABLET ORAL DAILY
Refills: 0 | Status: DISCONTINUED | OUTPATIENT
Start: 2020-06-22 | End: 2020-06-30

## 2020-06-22 RX ADMIN — Medication 100 MILLIGRAM(S): at 11:29

## 2020-06-22 RX ADMIN — LIDOCAINE 1 PATCH: 4 CREAM TOPICAL at 11:29

## 2020-06-22 RX ADMIN — PANTOPRAZOLE SODIUM 40 MILLIGRAM(S): 20 TABLET, DELAYED RELEASE ORAL at 06:02

## 2020-06-22 RX ADMIN — LIDOCAINE 1 PATCH: 4 CREAM TOPICAL at 23:50

## 2020-06-22 RX ADMIN — Medication 40 MILLIGRAM(S): at 17:10

## 2020-06-22 RX ADMIN — LISINOPRIL 20 MILLIGRAM(S): 2.5 TABLET ORAL at 06:02

## 2020-06-22 RX ADMIN — LIDOCAINE 1 PATCH: 4 CREAM TOPICAL at 19:47

## 2020-06-22 RX ADMIN — LIDOCAINE 1 PATCH: 4 CREAM TOPICAL at 01:36

## 2020-06-22 RX ADMIN — Medication 0.12 MILLIGRAM(S): at 06:02

## 2020-06-22 RX ADMIN — Medication 3 MILLILITER(S): at 05:44

## 2020-06-22 RX ADMIN — WARFARIN SODIUM 5 MILLIGRAM(S): 2.5 TABLET ORAL at 21:50

## 2020-06-22 RX ADMIN — Medication 3 MILLILITER(S): at 00:02

## 2020-06-22 RX ADMIN — Medication 3 MILLILITER(S): at 17:38

## 2020-06-22 RX ADMIN — Medication 25 MILLIGRAM(S): at 06:02

## 2020-06-22 RX ADMIN — SIMVASTATIN 5 MILLIGRAM(S): 20 TABLET, FILM COATED ORAL at 21:51

## 2020-06-22 RX ADMIN — Medication 40 MILLIGRAM(S): at 06:02

## 2020-06-22 RX ADMIN — Medication 3 MILLILITER(S): at 11:13

## 2020-06-22 NOTE — DIETITIAN INITIAL EVALUATION ADULT. - PERTINENT MEDS FT
MEDICATIONS  (STANDING):  albuterol/ipratropium for Nebulization 3 milliLiter(s) Nebulizer every 6 hours  allopurinol 100 milliGRAM(s) Oral daily  digoxin     Tablet 0.125 milliGRAM(s) Oral daily  furosemide    Tablet 40 milliGRAM(s) Oral two times a day  lidocaine   Patch 1 Patch Transdermal daily  lisinopril 20 milliGRAM(s) Oral daily  metoprolol succinate ER 25 milliGRAM(s) Oral daily  pantoprazole    Tablet 40 milliGRAM(s) Oral before breakfast  simvastatin 5 milliGRAM(s) Oral at bedtime  warfarin 5 milliGRAM(s) Oral once    MEDICATIONS  (PRN):

## 2020-06-22 NOTE — DISCHARGE NOTE NURSING/CASE MANAGEMENT/SOCIAL WORK - NSSCTYPOFSERV_GEN_ALL_CORE
Skilled Nursing, Physical Therapy, and Home Health Aide Evaluations to be provided. Skilled Nursing and Physical Therapy Evaluations to be provided.

## 2020-06-22 NOTE — DIETITIAN INITIAL EVALUATION ADULT. - OTHER INFO
Diet PTA: Low sodium ,  did the shopping/cooking, occasional use of salt in soup reported.   As per diet hx provided, pt ate breakfast & dinner, ate fruits and Ensure for lunch due to depressed appetite.  Pt is consuming ~75% of meals @ present.   Pt was on warfarin(coumadin) PTA & is aware of drug nutrient interaction.  Pt was not self monitoring daily for hx of CHF.  RD educated pt on heart failure nutrition therapy, daily wt. monitoring guidelines & wt. management for obesity provided.  Pt encouraged adequate intake of high Iron foods in diet due to iron deficiency anemia.

## 2020-06-22 NOTE — DIETITIAN INITIAL EVALUATION ADULT. - ENERGY NEEDS
Height (cm): 162.6 (06-20)  Weight (kg): 109.1 (06-20)  BMI (kg/m2): 41.3 (06-20)  IBW: 54.4 kg         % IBW: 200%          UBW: 111.5 kg            %UBW: 98%

## 2020-06-22 NOTE — DIETITIAN INITIAL EVALUATION ADULT. - PERTINENT LABORATORY DATA
06-22 Na136 mmol/L Glu 104 mg/dL<H> K+ 4.2 mmol/L Cr  1.60 mg/dL<H> BUN 59 mg/dL<H> 06-20 Alb 3.3 g/dL06-20 ALT 19 U/L AST 18 U/L Alkaline Phosphatase 116 U/L

## 2020-06-22 NOTE — DIETITIAN INITIAL EVALUATION ADULT. - PHYSICAL APPEARANCE
BMI=41.3(06/20/20), morbidly obese, 06/26/20, 2+ edema of legs, feet noted/obese/other (specify) Pt visibly did not appear to have visible physical findings of muscle or fat loss

## 2020-06-22 NOTE — PROGRESS NOTE ADULT - ASSESSMENT
IMPROVE VTE Individual Risk Assessment    RISK                                                                Points    [  ] Previous VTE                                                  3    [  ] Thrombophilia                                               2    [  ] Lower limb paralysis                                      2        (unable to hold up >15 seconds)      [  ] Current Cancer                                              2         (within 6 months)    [  ] Immobilization > 24 hrs                                1    [  ] ICU/CCU stay > 24 hours                              1    [  ] Age > 60                                                      1  2  IMPROVE VTE Score _________    IMPROVE Score 0-1: Low Risk, No VTE prophylaxis required for most patients, encourage ambulation.   IMPROVE Score 2-3: At risk, pharmacologic VTE prophylaxis is indicated for most patients (in the absence of a contraindication)  IMPROVE Score > or = 4: High Risk, pharmacologic VTE prophylaxis is indicated for most patients (in the absence of a contraindication)  On Coumadin    68 y/o CHF, COPD on home O2 3L, HTN, Gout, and pulmonary HTN, afib, presents with sob x 2 weeks, worse in past week. denies fever, body aches, cough, cp. denies black/bloody BM. Stool Occult blood negative.  06/21/2020 : Pt. alert. Breathing improved. Hb 6.2, Has Antibodies, having difficulty with type and X-match. Continue present.  06/22/2020 : Alert. Breathing better. Transfuse PRBC for Hb. 7.0. Pulmonary consult noted.

## 2020-06-22 NOTE — PROGRESS NOTE ADULT - SUBJECTIVE AND OBJECTIVE BOX
INTERVAL HPI:  69 year female with HTN, A fib, Mitral & Tricuspid valve replacement, pHTN, Diastolic CHF, COPD(never smoked but reports 2nd hand exposure from ), Chronic hypoxic hypercarbic Respiratory failure  on home O2, Obesity, MIGUEL(did not tolerate CPAP), pHTN, Gout, Anemia, Renal Insuffiencey  Presented  with sob x 2 weeks, which got  worst  in past week. SOB more with exertion and in supine position, better at rest.  Denies fever, body aches, Significant new cough or sputum production, cp, black/bloody BM.     OVERNIGHT EVENTS:  Awake and comfortable.    Vital Signs Last 24 Hrs  T(C): 36.4 (2020 17:01), Max: 37.1 (2020 18:38)  T(F): 97.6 (2020 17:01), Max: 98.8 (2020 10:18)  HR: 88 (2020 17:38) (63 - 88)  BP: 143/71 (2020 17:01) (111/60 - 162/80)  BP(mean): 110 (2020 17:01) (110 - 110)  RR: 19 (2020 17:01) (18 - 20)  SpO2: 98% (2020 17:38) (95% - 99%)    PHYSICAL EXAM:  GEN:         Awake, responsive and comfortable.  HEENT:    Normal.    RESP:       no wheezing.  CVS:           egular rate and rhythm.   ABD:         Soft, non-tender, non-distended;     MEDICATIONS  (STANDING):  albuterol/ipratropium for Nebulization 3 milliLiter(s) Nebulizer every 6 hours  allopurinol 100 milliGRAM(s) Oral daily  digoxin     Tablet 0.125 milliGRAM(s) Oral daily  furosemide    Tablet 40 milliGRAM(s) Oral two times a day  lidocaine   Patch 1 Patch Transdermal daily  lisinopril 20 milliGRAM(s) Oral daily  metoprolol succinate ER 25 milliGRAM(s) Oral daily  pantoprazole    Tablet 40 milliGRAM(s) Oral before breakfast  simvastatin 5 milliGRAM(s) Oral at bedtime  warfarin 5 milliGRAM(s) Oral once    LABS:                        7.0    10.74 )-----------( 433      ( 2020 07:23 )             26.3     06-22    136  |  96  |  59<H>  ----------------------------<  104<H>  4.2   |  37<H>  |  1.60<H>    Ca    9.3      2020 07:23  Mg     2.4         PT/INR - ( 2020 07:07 )   PT: 21.4 sec;   INR: 1.89 ratio      PTT - ( 2020 06:56 )  PTT:39.6 sec   @ 15:55  pH: 7.45  pCO2: 55  pO2: 88  SaO2: 96  < from: TTE Echo Complete w/ Contrast w/ Doppler (20 @ 09:56) >   EXAM:  ECHO TTE WITH CON COMP W DOPP      PROCEDURE DATE:  2020      INTERPRETATION:  REPORT:    TRANSTHORACIC ECHOCARDIOGRAM REPORT    Patient Name:   DAMARI GUERRERO Patient Location: [facility] Rec #:  [id number]           Accession #:      [id number]  Account #:                           Height:           63.8 in 162.0 cm  YOB: 1950             Weight:           240.5 lb 109.10 kg  Patient Age:    69 years             BSA:              2.11 m²  Patient Gender: F                    BP:               128/61 mmHg    Date of Exam:        2020 9:56:22 AM  Sonographer:         MICHELLE  Referring Physician: VISHNU    Procedure:     2D Echo/Doppler/Color Doppler Complete.  Indications:   Heart failure, unspecified - I50.9  Diagnosis:     Heart failure, unspecified - I50.9  Study Details: Technically suboptimal study. Study quality was adversely                 affected due to body habitus.  2D AND M-MODE MEASUREMENTS (normal ranges within parentheses):  Left Ventricle:                  Normal   Aorta/Left Atrium:          Normal  IVSd (2D):              1.42 cm (0.7-1.1) Aortic Root (2D):  3.51 cm (2.4-3.7)  LVPWd (2D):             1.38 cm (0.7-1.1) Left Atrium (2D):  4.36 cm (1.9-4.0)  LVIDd (2D):             4.68 cm (3.4-5.7) Right Ventricle:  LVIDs (2D):             3.28 cm           TAPSE:           1.37 cm  LV FS (2D):             29.9 %   (>25%)  Relative Wall Thickness  0.59    (<0.42)    LV DIASTOLIC FUNCTION:  MV Peak E: 1.49 m/s Decel Time: 224 msec  MV Peak A: 1.16 m/s  E/A Ratio: 1.28    SPECTRAL DOPPLER ANALYSIS (where applicable):  Mitral Valve:  MV Max Blu:   2.99 m/s  MV P1/2 Time: 65.04 msec  MV Mean Grad: 15.8 mmHg MV Area, PHT: 3.38 cm²    Aortic Valve: AoV Max Blu: 2.33 m/s AoV Peak P.7 mmHg AoV Mean P.2 mmHg    LVOT Vmax: 1.42 m/s LVOT VTI: 0.243 m LVOT Diameter: 2.30 cm    AoV Area, Vmax: 2.53 cm² AoV Area, VTI: 2.96 cm² AoV Area, Vmn: 2.65 cm²    Tricuspid Valve and PA/RV Systolic Pressure: TR Max Velocity: 2.84 m/s RA Pressure: 5 mmHg RVSP/PASP: 37.2 mmHg    PHYSICIAN INTERPRETATION:  Left Ventricle: Endocardial visualization was enhanced with intravenous echo contrast. The left ventricular internal cavity size is normal.  Global LV systolic function was normal. Left ventricular ejection fraction, by visual estimation, is 60 to 65%. Spectral Doppler shows pseudonormal pattern of left ventricular myocardial filling (Grade II diastolic dysfunction). Elevated left atrial and left ventricular end-diastolic pressures.  Right Ventricle: Normal right ventricular size and function.  Left Atrium: Mild to moderately enlarged left atrium.  Right Atrium: Mildly enlarged right atrium.  Pericardium: There is no evidence of pericardial effusion.  Mitral Valve: The mitral valve is not well seen. There is mild mitral annular calcification. Echo and/or Doppler findings are consistent with elevated gradients noted. the mitral prosthesis. Peak transmitral mean gradient equals 15.8 mmHg, calculated mitral valve area by pressure half time equals 3.38 cm² consistent with No evidence of mitral stenosis. Mild mitral valve regurgitation is seen.  Tricuspid Valve: The tricuspid valve is not well seen. Mild tricuspid regurgitation is visualized. Estimated pulmonary artery systolic pressure is 37.2 mmHg assuming a right atrial pressure of 5 mmHg, which is consistent with borderline pulmonary hypertension.  Aortic Valve: The aortic valve was not well visualized. Peak transaortic gradient equals 21.7 mmHg, mean transaortic gradient equals 9.2 mmHg, the calculated aortic valve area equals 2.96 cm² by the continuity equation consistent with normally opening aortic valve. No evidence of aortic valve regurgitation is seen.  Pulmonic Valve: The pulmonic valve was not well visualized.  Aorta: Aortic root measured at Sinus of Valsalva is normal.  Venous: The inferior vena cava was normal sized, with respiratory size variation greater than 50%.     Summary:   1. Left ventricular ejection fraction, by visual estimation, is 60 to 65%.   2. Technically suboptimal study.   3. Normal global left ventricular systolic function.   4. Normal left ventricular internal cavity size.   5. Spectral Doppler shows pseudonormal pattern of left ventricular myocardial filling (Grade II diastolic dysfunction).   6. There is mild concentric left ventricular hypertrophy.   7. Normal right ventricular size and function.   8. There is no evidence of pericardial effusion.   9. Mild mitral annular calcification.  10.Mild mitral valve regurgitation.  11. Mitral prosthesis with elevated gradients noted.  12. Estimated pulmonary artery systolic pressure is 37.2 mmHg assuming a right atrial pressure of 5 mmHg, which is consistent with borderline pulmonary hypertension.  13. Endocardial visualization was enhanced with intravenous echo contrast.  14. Elevated left atrial and left ventricular end-diastolic pressures.    Heladio Angeles MD FACC, FASE, FACP  Electronically signed on 2020 at 10:30:57 PM     HELADIO ANGELES   This document has been electronically signed. 2020  9:56AM    ASSESSMENT AND PLAN:  ·	Acute on chronic hypoxic hypercarbic Respiratory failure.  ·	Acute on chronic diastolic CHF.  ·	Acute COPD exacerbation.  ·	Obesity.  ·	MIGUEL, did not tolerate CPAP.  ·	Atrial fibrillation.  ·	pHTN.  ·	S/P Mitral and Tricuspid Valve replacement.  ·	Suspect GI bleed.  ·	Renal Insuffiencey.    Low H & H, for PRBC transfusion.  Echo noted.  Continue treated.

## 2020-06-22 NOTE — CHART NOTE - NSCHARTNOTEFT_GEN_A_CORE
Upon Nutritional Assessment by the Registered Dietitian your patient was determined to meet criteria / has evidence of the following diagnosis/diagnoses:          [ ]  Mild Protein Calorie Malnutrition        [ ]  Moderate Protein Calorie Malnutrition        [ ] Severe Protein Calorie Malnutrition        [ ] Unspecified Protein Calorie Malnutrition        [ ] Underweight / BMI <19        [x ] Morbid Obesity / BMI > 40      Findings as based on:  •  Comprehensive nutrition assessment and consultation  •  Calorie counts (nutrient intake analysis)  •  Food acceptance and intake status from observations by staff  •  Follow up  •  Patient education  •  Intervention secondary to interdisciplinary rounds  •   concerns      Treatment:    The following diet has been recommended:  Continue c DASH/ TLC diet      PROVIDER Section:     By signing this assessment you are acknowledging and agree with the diagnosis/diagnoses assigned by the Registered Dietitian    Comments:

## 2020-06-22 NOTE — PROGRESS NOTE ADULT - SUBJECTIVE AND OBJECTIVE BOX
Patient is a 69y old  Female who presents with a chief complaint of VILLA (21 Jun 2020 12:32)      INTERVAL HPI/OVERNIGHT EVENTS:    MEDICATIONS  (STANDING):  albuterol/ipratropium for Nebulization 3 milliLiter(s) Nebulizer every 6 hours  allopurinol 100 milliGRAM(s) Oral daily  digoxin     Tablet 0.125 milliGRAM(s) Oral daily  furosemide    Tablet 40 milliGRAM(s) Oral two times a day  lidocaine   Patch 1 Patch Transdermal daily  lisinopril 20 milliGRAM(s) Oral daily  metoprolol succinate ER 25 milliGRAM(s) Oral daily  pantoprazole    Tablet 40 milliGRAM(s) Oral before breakfast  simvastatin 5 milliGRAM(s) Oral at bedtime  warfarin 5 milliGRAM(s) Oral once    MEDICATIONS  (PRN):      Allergies    No Known Allergies    Intolerances        REVIEW OF SYSTEMS:  CONSTITUTIONAL: No fever, weight loss, or fatigue  EYES: No eye pain, visual disturbances, or discharge  ENMT:  No difficulty hearing, tinnitus, vertigo; No sinus or throat pain  NECK: No pain or stiffness  BREASTS: No pain, masses, or nipple discharge  RESPIRATORY: No cough, wheezing, chills or hemoptysis; No shortness of breath  CARDIOVASCULAR: No chest pain, palpitations, dizziness, or leg swelling  GASTROINTESTINAL: No abdominal or epigastric pain. No nausea, vomiting, or hematemesis; No diarrhea or constipation. No melena or hematochezia.  GENITOURINARY: No dysuria, frequency, hematuria, or incontinence  NEUROLOGICAL: No headaches, memory loss, loss of strength, numbness, or tremors  SKIN: No itching, burning, rashes, or lesions   LYMPH NODES: No enlarged glands  ENDOCRINE: No heat or cold intolerance; No hair loss  MUSCULOSKELETAL: No joint pain or swelling; No muscle, back, or extremity pain  PSYCHIATRIC: No depression, anxiety, mood swings, or difficulty sleeping  HEME/LYMPH: No easy bruising, or bleeding gums  ALLERGY AND IMMUNOLOGIC: No hives or eczema    Vital Signs Last 24 Hrs  T(C): 37.1 (22 Jun 2020 11:30), Max: 37.1 (21 Jun 2020 18:38)  T(F): 98.7 (22 Jun 2020 11:30), Max: 98.8 (22 Jun 2020 10:18)  HR: 72 (22 Jun 2020 11:30) (63 - 88)  BP: 138/90 (22 Jun 2020 11:30) (111/60 - 151/81)  BP(mean): --  RR: 18 (22 Jun 2020 11:30) (17 - 20)  SpO2: 98% (22 Jun 2020 11:30) (96% - 99%)    PHYSICAL EXAM:  GENERAL: NAD, well-groomed, Obese  HEAD:  Atraumatic, Normocephalic  EYES: EOMI, PERRL, conjunctiva and sclera clear  ENMT:  Moist mucous membranes, Good dentition, No lesions  NECK: Supple, No JVD, Normal thyroid  NERVOUS SYSTEM:  Alert & Oriented X3, Good concentration; Motor Strength 5/5 B/L upper and lower extremities; DTRs 2+ intact and symmetric  CHEST/LUNG: Clear to percussion bilaterally; No rales, rhonchi, wheezing, or rubs  HEART: Regular rate and rhythm; No murmurs, rubs, or gallops  ABDOMEN: Soft, Nontender, Nondistended; Bowel sounds present. Obese.  EXTREMITIES:  2+ Peripheral Pulses, No clubbing, cyanosis, or edema  LYMPH: No lymphadenopathy noted  SKIN: No rashes or lesions    LABS:                        7.0    10.74 )-----------( 433      ( 22 Jun 2020 07:23 )             26.3     06-22    136  |  96  |  59<H>  ----------------------------<  104<H>  4.2   |  37<H>  |  1.60<H>    Ca    9.3      22 Jun 2020 07:23  Mg     2.4     06-21    TPro  9.3<H>  /  Alb  3.3  /  TBili  0.4  /  DBili  x   /  AST  18  /  ALT  19  /  AlkPhos  116  06-20    PT/INR - ( 22 Jun 2020 07:07 )   PT: 21.4 sec;   INR: 1.89 ratio         PTT - ( 21 Jun 2020 06:56 )  PTT:39.6 sec    CAPILLARY BLOOD GLUCOSE        ABG - ( 20 Jun 2020 15:55 )  pH, Arterial: x     pH, Blood: 7.45  /  pCO2: 55    /  pO2: 88    / HCO3: 38    / Base Excess: 12.5  /  SaO2: 96                CARDIAC MARKERS ( 20 Jun 2020 13:59 )  <.015 ng/mL / x     / x     / x     / x                    RADIOLOGY & ADDITIONAL TESTS:    Imaging Personally Reviewed:  [ ] YES  [ ] NO    Consultant(s) Notes Reviewed:  [ ] YES  [ ] NO    Care Discussed with Consultants/Other Providers [ ] YES  [ ] NO    PROBLEMS:  COPD EXACERBATION;  CONGESTIVE HEART FAILURE;  ANEMIA  SHORTNESS OF BREATH  COPD exacerbation  Longstanding persistent atrial fibrillation  Essential hypertension  Gout  Other iron deficiency anemia  COPD exacerbation  Acute on chronic systolic congestive heart failure      Care discussed with family,         [  ]   yes  [  ]  No    imp:    stable[ ]    unstable[  ]     improving [   ]       unchanged  [  ]                Plans:  Continue present plans  [  ]               New consult [  ]   specialty  .......               order maribell[  ]    test name.                  Discharge Planning  [  ]

## 2020-06-23 LAB
ANION GAP SERPL CALC-SCNC: 3 MMOL/L — LOW (ref 5–17)
BUN SERPL-MCNC: 55 MG/DL — HIGH (ref 7–23)
CALCIUM SERPL-MCNC: 9.2 MG/DL — SIGNIFICANT CHANGE UP (ref 8.5–10.1)
CHLORIDE SERPL-SCNC: 99 MMOL/L — SIGNIFICANT CHANGE UP (ref 96–108)
CO2 SERPL-SCNC: 38 MMOL/L — HIGH (ref 22–31)
CREAT SERPL-MCNC: 1.47 MG/DL — HIGH (ref 0.5–1.3)
DIGOXIN SERPL-MCNC: 1.46 NG/ML — SIGNIFICANT CHANGE UP (ref 0.8–2)
GLUCOSE SERPL-MCNC: 101 MG/DL — HIGH (ref 70–99)
HCT VFR BLD CALC: 27.5 % — LOW (ref 34.5–45)
HGB BLD-MCNC: 7.7 G/DL — LOW (ref 11.5–15.5)
INR BLD: 2.06 RATIO — HIGH (ref 0.88–1.16)
MCHC RBC-ENTMCNC: 21.9 PG — LOW (ref 27–34)
MCHC RBC-ENTMCNC: 28 GM/DL — LOW (ref 32–36)
MCV RBC AUTO: 78.3 FL — LOW (ref 80–100)
NRBC # BLD: 0 /100 WBCS — SIGNIFICANT CHANGE UP (ref 0–0)
PLATELET # BLD AUTO: 398 K/UL — SIGNIFICANT CHANGE UP (ref 150–400)
POTASSIUM SERPL-MCNC: 4.1 MMOL/L — SIGNIFICANT CHANGE UP (ref 3.5–5.3)
POTASSIUM SERPL-SCNC: 4.1 MMOL/L — SIGNIFICANT CHANGE UP (ref 3.5–5.3)
PROTHROM AB SERPL-ACNC: 23.6 SEC — HIGH (ref 10–12.9)
RBC # BLD: 3.51 M/UL — LOW (ref 3.8–5.2)
RBC # FLD: 19.5 % — HIGH (ref 10.3–14.5)
SODIUM SERPL-SCNC: 140 MMOL/L — SIGNIFICANT CHANGE UP (ref 135–145)
WBC # BLD: 9.52 K/UL — SIGNIFICANT CHANGE UP (ref 3.8–10.5)
WBC # FLD AUTO: 9.52 K/UL — SIGNIFICANT CHANGE UP (ref 3.8–10.5)

## 2020-06-23 RX ORDER — WARFARIN SODIUM 2.5 MG/1
5 TABLET ORAL ONCE
Refills: 0 | Status: COMPLETED | OUTPATIENT
Start: 2020-06-23 | End: 2020-06-23

## 2020-06-23 RX ADMIN — Medication 3 MILLILITER(S): at 11:17

## 2020-06-23 RX ADMIN — LISINOPRIL 20 MILLIGRAM(S): 2.5 TABLET ORAL at 05:23

## 2020-06-23 RX ADMIN — Medication 25 MILLIGRAM(S): at 05:23

## 2020-06-23 RX ADMIN — Medication 0.12 MILLIGRAM(S): at 05:22

## 2020-06-23 RX ADMIN — LIDOCAINE 1 PATCH: 4 CREAM TOPICAL at 20:18

## 2020-06-23 RX ADMIN — WARFARIN SODIUM 5 MILLIGRAM(S): 2.5 TABLET ORAL at 22:03

## 2020-06-23 RX ADMIN — Medication 40 MILLIGRAM(S): at 22:06

## 2020-06-23 RX ADMIN — PANTOPRAZOLE SODIUM 40 MILLIGRAM(S): 20 TABLET, DELAYED RELEASE ORAL at 05:23

## 2020-06-23 RX ADMIN — LIDOCAINE 1 PATCH: 4 CREAM TOPICAL at 11:32

## 2020-06-23 RX ADMIN — Medication 3 MILLILITER(S): at 00:11

## 2020-06-23 RX ADMIN — Medication 40 MILLIGRAM(S): at 17:15

## 2020-06-23 RX ADMIN — Medication 40 MILLIGRAM(S): at 05:25

## 2020-06-23 RX ADMIN — Medication 100 MILLIGRAM(S): at 11:33

## 2020-06-23 RX ADMIN — LIDOCAINE 1 PATCH: 4 CREAM TOPICAL at 23:03

## 2020-06-23 RX ADMIN — Medication 40 MILLIGRAM(S): at 12:52

## 2020-06-23 RX ADMIN — SIMVASTATIN 5 MILLIGRAM(S): 20 TABLET, FILM COATED ORAL at 22:06

## 2020-06-23 RX ADMIN — Medication 3 MILLILITER(S): at 17:18

## 2020-06-23 RX ADMIN — Medication 3 MILLILITER(S): at 05:27

## 2020-06-23 NOTE — PROGRESS NOTE ADULT - SUBJECTIVE AND OBJECTIVE BOX
INTERVAL HPI:  69 year female with HTN, A fib, Mitral & Tricuspid valve replacement, pHTN, Diastolic CHF, COPD(never smoked but reports 2nd hand exposure from ), Chronic hypoxic hypercarbic Respiratory failure  on home O2, Obesity, MIGUEL(did not tolerate CPAP), pHTN, Gout, Anemia, Renal Insuffiencey  Presented  with sob x 2 weeks, which got  worst  in past week. SOB more with exertion and in supine position, better at rest.  Denies fever, body aches, Significant new cough or sputum production, cp, black/bloody BM.    OVERNIGHT EVENTS:  Had wheezing earlier, started on steroids.    Vital Signs Last 24 Hrs  T(C): 37.1 (23 Jun 2020 16:47), Max: 37.1 (23 Jun 2020 16:47)  T(F): 98.8 (23 Jun 2020 16:47), Max: 98.8 (23 Jun 2020 16:47)  HR: 85 (23 Jun 2020 16:47) (73 - 96)  BP: 163/84 (23 Jun 2020 16:47) (120/71 - 163/84)  BP(mean): 110 (22 Jun 2020 17:01) (110 - 110)  RR: 16 (23 Jun 2020 16:47) (16 - 19)  SpO2: 98% (23 Jun 2020 16:47) (95% - 98%)    PHYSICAL EXAM:  GEN:         Awake, responsive and comfortable.  HEENT:    Normal.    RESP:        no distress  CVS:          Regular rate and rhythm.   ABD:         Soft, non-tender, non-distended;     MEDICATIONS  (STANDING):  albuterol/ipratropium for Nebulization 3 milliLiter(s) Nebulizer every 6 hours  allopurinol 100 milliGRAM(s) Oral daily  digoxin     Tablet 0.125 milliGRAM(s) Oral daily  furosemide    Tablet 40 milliGRAM(s) Oral two times a day  lidocaine   Patch 1 Patch Transdermal daily  lisinopril 20 milliGRAM(s) Oral daily  methylPREDNISolone sodium succinate Injectable 40 milliGRAM(s) IV Push three times a day  metoprolol succinate ER 25 milliGRAM(s) Oral daily  pantoprazole    Tablet 40 milliGRAM(s) Oral before breakfast  simvastatin 5 milliGRAM(s) Oral at bedtime  warfarin 5 milliGRAM(s) Oral once    LABS:                        7.7    9.52  )-----------( 398      ( 23 Jun 2020 07:34 )             27.5     06-23    140  |  99  |  55<H>  ----------------------------<  101<H>  4.1   |  38<H>  |  1.47<H>    Ca    9.2      23 Jun 2020 07:34    PT/INR - ( 23 Jun 2020 07:34 )   PT: 23.6 sec;   INR: 2.06 ratio      06-20 @ 15:55  pH: 7.45  pCO2: 55  pO2: 88  SaO2: 96    ASSESSMENT AND PLAN:  ·	Acute on chronic hypoxic hypercarbic Respiratory failure.  ·	Acute on chronic diastolic CHF.  ·	Acute COPD exacerbation.  ·	Obesity.  ·	MIGUEL, did not tolerate CPAP.  ·	Atrial fibrillation.  ·	pHTN.  ·	S/P Mitral and Tricuspid Valve replacement.  ·	Suspect GI bleed.  ·	Renal Insuffiencey.    Reported significant tachycardia with exertion, follow electrolytes and renal function.  Continue diuretics and nebulizer.

## 2020-06-23 NOTE — PHYSICAL THERAPY INITIAL EVALUATION ADULT - BALANCE TRAINING, PT EVAL
Pt will improve static & dynamic standing balance to Good using [Rolling walker] without SOB  to perform ADL, Gait independently  in 2 weeks

## 2020-06-23 NOTE — PHYSICAL THERAPY INITIAL EVALUATION ADULT - TRANSFER TRAINING, PT EVAL
Pt will be able to perform sit to stand, stand pivot transfer using [RW]  without SOB   independently in 1 week

## 2020-06-23 NOTE — PHYSICAL THERAPY INITIAL EVALUATION ADULT - STRENGTHENING, PT EVAL
Pt will improve muscle strength in all extremities to WFL in 1 to 2 weeks to perform Gait & ADL independently without SOB

## 2020-06-23 NOTE — PHYSICAL THERAPY INITIAL EVALUATION ADULT - ADDITIONAL COMMENTS
As per pt, she lives with her spouse in a house with 4 steps to enter with bilateral rails wide apart, once inside there are more steps , however pt stays at first floor, she uses cane for all functional mobility, at times uses RW, however lately for last 2 months, pt needs assist from her  for ADLs especially showering, she requested for HHA,

## 2020-06-23 NOTE — PROGRESS NOTE ADULT - SUBJECTIVE AND OBJECTIVE BOX
Patient is a 69y old  Female who presents with a chief complaint of VILLA (22 Jun 2020 17:42)      INTERVAL HPI/OVERNIGHT EVENTS:    MEDICATIONS  (STANDING):  albuterol/ipratropium for Nebulization 3 milliLiter(s) Nebulizer every 6 hours  allopurinol 100 milliGRAM(s) Oral daily  digoxin     Tablet 0.125 milliGRAM(s) Oral daily  furosemide    Tablet 40 milliGRAM(s) Oral two times a day  lidocaine   Patch 1 Patch Transdermal daily  lisinopril 20 milliGRAM(s) Oral daily  metoprolol succinate ER 25 milliGRAM(s) Oral daily  pantoprazole    Tablet 40 milliGRAM(s) Oral before breakfast  simvastatin 5 milliGRAM(s) Oral at bedtime  warfarin 5 milliGRAM(s) Oral once    MEDICATIONS  (PRN):      Allergies    No Known Allergies    Intolerances        REVIEW OF SYSTEMS:  CONSTITUTIONAL: No fever, weight loss, or fatigue  EYES: No eye pain, visual disturbances, or discharge  ENMT:  No difficulty hearing, tinnitus, vertigo; No sinus or throat pain  NECK: No pain or stiffness  BREASTS: No pain, masses, or nipple discharge  RESPIRATORY: No cough, wheezing, chills or hemoptysis; No shortness of breath  CARDIOVASCULAR: No chest pain, palpitations, dizziness, or leg swelling  GASTROINTESTINAL: No abdominal or epigastric pain. No nausea, vomiting, or hematemesis; No diarrhea or constipation. No melena or hematochezia.  GENITOURINARY: No dysuria, frequency, hematuria, or incontinence  NEUROLOGICAL: No headaches, memory loss, loss of strength, numbness, or tremors  SKIN: No itching, burning, rashes, or lesions   LYMPH NODES: No enlarged glands  ENDOCRINE: No heat or cold intolerance; No hair loss  MUSCULOSKELETAL: No joint pain or swelling; No muscle, back, or extremity pain  PSYCHIATRIC: No depression, anxiety, mood swings, or difficulty sleeping  HEME/LYMPH: No easy bruising, or bleeding gums  ALLERGY AND IMMUNOLOGIC: No hives or eczema    Vital Signs Last 24 Hrs  T(C): 36.8 (23 Jun 2020 10:43), Max: 36.9 (22 Jun 2020 14:15)  T(F): 98.3 (23 Jun 2020 10:43), Max: 98.5 (22 Jun 2020 14:15)  HR: 92 (23 Jun 2020 11:25) (71 - 96)  BP: 137/66 (23 Jun 2020 10:43) (120/71 - 162/80)  BP(mean): 110 (22 Jun 2020 17:01) (110 - 110)  RR: 16 (23 Jun 2020 10:43) (16 - 19)  SpO2: 98% (23 Jun 2020 11:25) (95% - 98%)    PHYSICAL EXAM:  GENERAL: NAD, Obese.  HEAD:  Atraumatic, Normocephalic  EYES: EOMI, PERRL, conjunctiva and sclera clear  ENMT:  Moist mucous membranes, Good dentition, No lesions  NECK: Supple, No JVD, Normal thyroid  NERVOUS SYSTEM:  Alert & Oriented  X3, Good concentration; Motor Strength 4/5 B/L upper and lower extremities; CHEST/LUNG: Clear to percussion bilaterally; No rales, rhonchi, wheezing, or rubs  HEART: Regular rate and rhythm; No murmurs, rubs, or gallops  ABDOMEN: Soft, Nontender, Nondistended; Bowel sounds present  EXTREMITIES:  2+ Peripheral Pulses, No clubbing, cyanosis, or edema  LYMPH: No lymphadenopathy noted  SKIN: No rashes or lesions    LABS:                        7.7    9.52  )-----------( 398      ( 23 Jun 2020 07:34 )             27.5     06-23    140  |  99  |  55<H>  ----------------------------<  101<H>  4.1   |  38<H>  |  1.47<H>    Ca    9.2      23 Jun 2020 07:34      PT/INR - ( 23 Jun 2020 07:34 )   PT: 23.6 sec;   INR: 2.06 ratio             CAPILLARY BLOOD GLUCOSE        RADIOLOGY & ADDITIONAL TESTS:    Imaging Personally Reviewed:  [ ] YES  [ ] NO    Consultant(s) Notes Reviewed:  [ ] YES  [ ] NO    Care Discussed with Consultants/Other Providers [x ] YES  [ ] NO    PROBLEMS:  COPD EXACERBATION;  CONGESTIVE HEART FAILURE;  ANEMIA  SHORTNESS OF BREATH  COPD exacerbation  Longstanding persistent atrial fibrillation  Essential hypertension  Gout  Other iron deficiency anemia  COPD exacerbation  Acute on chronic systolic congestive heart failure      Care discussed with family,         [  ]   yes  [  ]  No    imp:    stable[ ]    unstable[  ]     improving [   ]       unchanged  [  ]                Plans:  Continue present plans  [  ]               New consult [  ]   specialty  .......               order maribell[  ]    test name.                  Discharge Planning  [  ]

## 2020-06-23 NOTE — PHYSICAL THERAPY INITIAL EVALUATION ADULT - CRITERIA FOR SKILLED THERAPEUTIC INTERVENTIONS
rehab potential/predicted duration of therapy intervention/risk reduction/prevention/anticipated discharge recommendation/impairments found/therapy frequency/functional limitations in following categories/anticipated equipment needs at discharge/Home with home PT, MAy need Rollator

## 2020-06-23 NOTE — PROGRESS NOTE ADULT - ASSESSMENT
IMPROVE VTE Individual Risk Assessment    RISK                                                                Points    [  ] Previous VTE                                                  3    [  ] Thrombophilia                                               2    [  ] Lower limb paralysis                                      2        (unable to hold up >15 seconds)      [  ] Current Cancer                                              2         (within 6 months)    [  ] Immobilization > 24 hrs                                1    [  ] ICU/CCU stay > 24 hours                              1    [  ] Age > 60                                                      1  2  IMPROVE VTE Score _________    IMPROVE Score 0-1: Low Risk, No VTE prophylaxis required for most patients, encourage ambulation.   IMPROVE Score 2-3: At risk, pharmacologic VTE prophylaxis is indicated for most patients (in the absence of a contraindication)  IMPROVE Score > or = 4: High Risk, pharmacologic VTE prophylaxis is indicated for most patients (in the absence of a contraindication)  On Coumadin    70 y/o CHF, COPD on home O2 3L, HTN, Gout, and pulmonary HTN, afib, presents with sob x 2 weeks, worse in past week. denies fever, body aches, cough, cp. denies black/bloody BM. Stool Occult blood negative.  06/21/2020 : Pt. alert. Breathing improved. Hb 6.2, Has Antibodies, having difficulty with type and X-match. Continue present.  06/22/2020 : Alert. Breathing better. Transfuse PRBC for Hb. 7.0. Pulmonary consult noted.  06/23/2020 : C/O SOB. S/P transfusion. Hb still 7.7. Add Solu medrol.  Minimal rhonchi bilateral. Continue Lasix. Follow CBC. Pulmonary F/U.

## 2020-06-24 LAB
ANION GAP SERPL CALC-SCNC: 5 MMOL/L — SIGNIFICANT CHANGE UP (ref 5–17)
BUN SERPL-MCNC: 57 MG/DL — HIGH (ref 7–23)
CALCIUM SERPL-MCNC: 9.8 MG/DL — SIGNIFICANT CHANGE UP (ref 8.5–10.1)
CHLORIDE SERPL-SCNC: 94 MMOL/L — LOW (ref 96–108)
CO2 SERPL-SCNC: 36 MMOL/L — HIGH (ref 22–31)
CREAT SERPL-MCNC: 1.49 MG/DL — HIGH (ref 0.5–1.3)
GLUCOSE SERPL-MCNC: 142 MG/DL — HIGH (ref 70–99)
HCT VFR BLD CALC: 31.2 % — LOW (ref 34.5–45)
HGB BLD-MCNC: 8.5 G/DL — LOW (ref 11.5–15.5)
INR BLD: 2.1 RATIO — HIGH (ref 0.88–1.16)
MCHC RBC-ENTMCNC: 22 PG — LOW (ref 27–34)
MCHC RBC-ENTMCNC: 27.2 GM/DL — LOW (ref 32–36)
MCV RBC AUTO: 80.6 FL — SIGNIFICANT CHANGE UP (ref 80–100)
NRBC # BLD: 0 /100 WBCS — SIGNIFICANT CHANGE UP (ref 0–0)
OB PNL STL: NEGATIVE — SIGNIFICANT CHANGE UP
PLATELET # BLD AUTO: 474 K/UL — HIGH (ref 150–400)
POTASSIUM SERPL-MCNC: 4.6 MMOL/L — SIGNIFICANT CHANGE UP (ref 3.5–5.3)
POTASSIUM SERPL-SCNC: 4.6 MMOL/L — SIGNIFICANT CHANGE UP (ref 3.5–5.3)
PROTHROM AB SERPL-ACNC: 23.8 SEC — HIGH (ref 10–12.9)
RBC # BLD: 3.87 M/UL — SIGNIFICANT CHANGE UP (ref 3.8–5.2)
RBC # FLD: 20.3 % — HIGH (ref 10.3–14.5)
SODIUM SERPL-SCNC: 135 MMOL/L — SIGNIFICANT CHANGE UP (ref 135–145)
WBC # BLD: 9.77 K/UL — SIGNIFICANT CHANGE UP (ref 3.8–10.5)
WBC # FLD AUTO: 9.77 K/UL — SIGNIFICANT CHANGE UP (ref 3.8–10.5)

## 2020-06-24 RX ORDER — WARFARIN SODIUM 2.5 MG/1
5 TABLET ORAL ONCE
Refills: 0 | Status: COMPLETED | OUTPATIENT
Start: 2020-06-24 | End: 2020-06-24

## 2020-06-24 RX ADMIN — Medication 40 MILLIGRAM(S): at 13:14

## 2020-06-24 RX ADMIN — WARFARIN SODIUM 5 MILLIGRAM(S): 2.5 TABLET ORAL at 22:10

## 2020-06-24 RX ADMIN — SIMVASTATIN 5 MILLIGRAM(S): 20 TABLET, FILM COATED ORAL at 22:10

## 2020-06-24 RX ADMIN — Medication 3 MILLILITER(S): at 00:50

## 2020-06-24 RX ADMIN — Medication 40 MILLIGRAM(S): at 05:53

## 2020-06-24 RX ADMIN — Medication 3 MILLILITER(S): at 06:06

## 2020-06-24 RX ADMIN — Medication 3 MILLILITER(S): at 17:05

## 2020-06-24 RX ADMIN — LISINOPRIL 20 MILLIGRAM(S): 2.5 TABLET ORAL at 05:53

## 2020-06-24 RX ADMIN — LIDOCAINE 1 PATCH: 4 CREAM TOPICAL at 19:03

## 2020-06-24 RX ADMIN — Medication 3 MILLILITER(S): at 23:45

## 2020-06-24 RX ADMIN — Medication 25 MILLIGRAM(S): at 05:54

## 2020-06-24 RX ADMIN — Medication 40 MILLIGRAM(S): at 05:52

## 2020-06-24 RX ADMIN — LIDOCAINE 1 PATCH: 4 CREAM TOPICAL at 11:35

## 2020-06-24 RX ADMIN — Medication 100 MILLIGRAM(S): at 11:35

## 2020-06-24 RX ADMIN — Medication 40 MILLIGRAM(S): at 17:13

## 2020-06-24 RX ADMIN — Medication 3 MILLILITER(S): at 11:00

## 2020-06-24 RX ADMIN — Medication 0.12 MILLIGRAM(S): at 05:52

## 2020-06-24 RX ADMIN — PANTOPRAZOLE SODIUM 40 MILLIGRAM(S): 20 TABLET, DELAYED RELEASE ORAL at 05:52

## 2020-06-24 RX ADMIN — Medication 40 MILLIGRAM(S): at 22:09

## 2020-06-24 RX ADMIN — LIDOCAINE 1 PATCH: 4 CREAM TOPICAL at 22:04

## 2020-06-24 NOTE — PROGRESS NOTE ADULT - SUBJECTIVE AND OBJECTIVE BOX
Patient is a 69y old  Female who presents with a chief complaint of VILLA (23 Jun 2020 16:49)      INTERVAL HPI/OVERNIGHT EVENTS:    MEDICATIONS  (STANDING):  albuterol/ipratropium for Nebulization 3 milliLiter(s) Nebulizer every 6 hours  allopurinol 100 milliGRAM(s) Oral daily  digoxin     Tablet 0.125 milliGRAM(s) Oral daily  furosemide    Tablet 40 milliGRAM(s) Oral two times a day  lidocaine   Patch 1 Patch Transdermal daily  lisinopril 20 milliGRAM(s) Oral daily  methylPREDNISolone sodium succinate Injectable 40 milliGRAM(s) IV Push three times a day  metoprolol succinate ER 25 milliGRAM(s) Oral daily  pantoprazole    Tablet 40 milliGRAM(s) Oral before breakfast  simvastatin 5 milliGRAM(s) Oral at bedtime  warfarin 5 milliGRAM(s) Oral once    MEDICATIONS  (PRN):      Allergies    No Known Allergies    Intolerances        REVIEW OF SYSTEMS:  CONSTITUTIONAL: No fever, weight loss, or fatigue  EYES: No eye pain, visual disturbances, or discharge  ENMT:  No difficulty hearing, tinnitus, vertigo; No sinus or throat pain  NECK: No pain or stiffness  BREASTS: No pain, masses, or nipple discharge  RESPIRATORY: No cough, wheezing, chills or hemoptysis; No shortness of breath  CARDIOVASCULAR: No chest pain, palpitations, dizziness, or leg swelling  GASTROINTESTINAL: No abdominal or epigastric pain. No nausea, vomiting, or hematemesis; No diarrhea or constipation. No melena or hematochezia.  GENITOURINARY: No dysuria, frequency, hematuria, or incontinence  NEUROLOGICAL: No headaches, memory loss, loss of strength, numbness, or tremors  SKIN: No itching, burning, rashes, or lesions   LYMPH NODES: No enlarged glands  ENDOCRINE: No heat or cold intolerance; No hair loss  MUSCULOSKELETAL: No joint pain or swelling; No muscle, back, or extremity pain  PSYCHIATRIC: No depression, anxiety, mood swings, or difficulty sleeping  HEME/LYMPH: No easy bruising, or bleeding gums  ALLERGY AND IMMUNOLOGIC: No hives or eczema    Vital Signs Last 24 Hrs  T(C): 36.6 (24 Jun 2020 05:02), Max: 37.1 (23 Jun 2020 16:47)  T(F): 97.8 (24 Jun 2020 05:02), Max: 98.8 (23 Jun 2020 16:47)  HR: 90 (24 Jun 2020 07:07) (79 - 105)  BP: 134/56 (24 Jun 2020 05:02) (134/56 - 163/84)  BP(mean): --  RR: 17 (24 Jun 2020 05:02) (16 - 17)  SpO2: 97% (24 Jun 2020 06:06) (95% - 98%)    PHYSICAL EXAM:  GENERAL: NAD,Obese  HEAD:  Atraumatic, Normocephalic  EYES: EOMI, PERRL, conjunctiva and sclera clear  ENMT:  Moist mucous membranes, Good dentition, No lesions  NECK: Supple, No JVD, Normal thyroid  NERVOUS SYSTEM:  Alert & Oriented X3, Good concentration; Motor Strength 4/5 B/L upper and lower extremities;   CHEST/LUNG: Clear to percussion bilaterally; No rales, + rhonchi,  HEART: Regular rate and rhythm; Systolic murmurs, rubs, or gallops  ABDOMEN: Soft, Nontender, Nondistended; Bowel sounds present  EXTREMITIES:  2+ Peripheral Pulses, No clubbing, cyanosis, or edema  LYMPH: No lymphadenopathy noted  SKIN: No rashes or lesions    LABS:                        8.5    9.77  )-----------( 474      ( 24 Jun 2020 06:16 )             31.2     06-24    135  |  94<L>  |  57<H>  ----------------------------<  142<H>  4.6   |  36<H>  |  1.49<H>    Ca    9.8      24 Jun 2020 06:16      PT/INR - ( 24 Jun 2020 06:16 )   PT: 23.8 sec;   INR: 2.10 ratio             CAPILLARY BLOOD GLUCOSE          RADIOLOGY & ADDITIONAL TESTS:  < from: Xray Chest 1 View-PORTABLE IMMEDIATE (06.20.20 @ 14:52) >  IMPRESSION: Cardiomegaly. Perihilar interstitial lung ill-defined opacities/infiltrates. Constellation of findings suggestive of CHF..    < end of copied text >    Imaging Personally Reviewed:  [ ] YES  [ ] NO    Consultant(s) Notes Reviewed:  [ ] YES  [ ] NO    Care Discussed with Consultants/Other Providers [ ] YES  [ ] NO    PROBLEMS:  COPD EXACERBATION;  CONGESTIVE HEART FAILURE;  ANEMIA  SHORTNESS OF BREATH  COPD exacerbation  Longstanding persistent atrial fibrillation  Essential hypertension  Gout  Other iron deficiency anemia  COPD exacerbation  Acute on chronic systolic congestive heart failure      Care discussed with family,         [  ]   yes  [  ]  No    imp:    stable[ ]    unstable[  ]     improving [   ]       unchanged  [  ]                Plans:  Continue present plans  [  ]               New consult [  ]   specialty  .......               order maribell[  ]    test name.                  Discharge Planning  [  ]

## 2020-06-24 NOTE — PROGRESS NOTE ADULT - SUBJECTIVE AND OBJECTIVE BOX
INTERVAL HPI:   69 year female with HTN, A fib, Mitral & Tricuspid valve replacement, pHTN, Diastolic CHF, COPD(never smoked but reports 2nd hand exposure from ), Chronic hypoxic hypercarbic Respiratory failure  on home O2, Obesity, MIGUEL(did not tolerate CPAP), pHTN, Gout, Anemia, Renal Insuffiencey  Presented  with sob x 2 weeks, which got  worst  in past week. SOB more with exertion and in supine position, better at rest.  Denies fever, body aches, Significant new cough or sputum production, cp, black/bloody BM.    OVERNIGHT EVENTS:  Reported to have significant tachycardia with exertion.    Vital Signs Last 24 Hrs  T(C): 36.5 (24 Jun 2020 16:51), Max: 37 (23 Jun 2020 23:46)  T(F): 97.7 (24 Jun 2020 16:51), Max: 98.6 (23 Jun 2020 23:46)  HR: 82 (24 Jun 2020 16:51) (79 - 105)  BP: 150/70 (24 Jun 2020 16:51) (129/56 - 168/70)  BP(mean): --  RR: 17 (24 Jun 2020 16:51) (17 - 18)  SpO2: 97% (24 Jun 2020 16:51) (95% - 99%)    PHYSICAL EXAM:  GEN:         Awake, responsive and comfortable.  HEENT:    Normal.    RESP:       no distress  CVS:          Regular rate and rhythm.   ABD:         Soft, non-tender, non-distended;     MEDICATIONS  (STANDING):  albuterol/ipratropium for Nebulization 3 milliLiter(s) Nebulizer every 6 hours  allopurinol 100 milliGRAM(s) Oral daily  digoxin     Tablet 0.125 milliGRAM(s) Oral daily  furosemide    Tablet 40 milliGRAM(s) Oral two times a day  lidocaine   Patch 1 Patch Transdermal daily  lisinopril 20 milliGRAM(s) Oral daily  methylPREDNISolone sodium succinate Injectable 40 milliGRAM(s) IV Push three times a day  metoprolol succinate ER 25 milliGRAM(s) Oral daily  pantoprazole    Tablet 40 milliGRAM(s) Oral before breakfast  simvastatin 5 milliGRAM(s) Oral at bedtime  warfarin 5 milliGRAM(s) Oral once    LABS:                        8.5    9.77  )-----------( 474      ( 24 Jun 2020 06:16 )             31.2     06-24    135  |  94<L>  |  57<H>  ----------------------------<  142<H>  4.6   |  36<H>  |  1.49<H>    Ca    9.8      24 Jun 2020 06:16    PT/INR - ( 24 Jun 2020 06:16 )   PT: 23.8 sec;   INR: 2.10 ratio      06-20 @ 15:55  pH: 7.45  pCO2: 55  pO2: 88  SaO2: 96    ASSESSMENT AND PLAN:  ·	Acute on chronic hypoxic hypercarbic Respiratory failure.  ·	Acute on chronic diastolic CHF.  ·	Acute COPD exacerbation.  ·	Obesity.  ·	MIGUEL, did not tolerate CPAP.  ·	Atrial fibrillation.  ·	pHTN.  ·	S/P Mitral and Tricuspid Valve replacement.  ·	Suspect GI bleed.  ·	Renal Insuffiencey.    Continue O2, nebulizer.  May reduce diuretics.

## 2020-06-24 NOTE — PROGRESS NOTE ADULT - PROBLEM SELECTOR PLAN 5
[FreeTextEntry1] : Problem\par 1) Solid adnexal Mass\par 2) Elevated CEA\par    a) 6 7/17/19\par    b) , AFP wnl\par \par Previous Therapy\par 1) Pelvic MRi 6/26/19\par    a) Uterus 7.6x6.8x5.2cm contains numerous degenerating fibroids, stable\par   b) Left adnexal solid mass 2.7x2.5x4.5cm increased from 1cm prior (in 2014)\par \par Here to review surgery again. Booked for 9/17/19. Monitor BP

## 2020-06-24 NOTE — PROGRESS NOTE ADULT - ASSESSMENT
IMPROVE VTE Individual Risk Assessment    RISK                                                                Points    [  ] Previous VTE                                                  3    [  ] Thrombophilia                                               2    [  ] Lower limb paralysis                                      2        (unable to hold up >15 seconds)      [  ] Current Cancer                                              2         (within 6 months)    [  ] Immobilization > 24 hrs                                1    [  ] ICU/CCU stay > 24 hours                              1    [  ] Age > 60                                                      1  2  IMPROVE VTE Score _________    IMPROVE Score 0-1: Low Risk, No VTE prophylaxis required for most patients, encourage ambulation.   IMPROVE Score 2-3: At risk, pharmacologic VTE prophylaxis is indicated for most patients (in the absence of a contraindication)  IMPROVE Score > or = 4: High Risk, pharmacologic VTE prophylaxis is indicated for most patients (in the absence of a contraindication)  On Coumadin    70 y/o CHF, COPD on home O2 3L, HTN, Gout, and pulmonary HTN, afib, presents with sob x 2 weeks, worse in past week. denies fever, body aches, cough, cp. denies black/bloody BM. Stool Occult blood negative.  06/21/2020 : Pt. alert. Breathing improved. Hb 6.2, Has Antibodies, having difficulty with type and X-match. Continue present.  06/22/2020 : Alert. Breathing better. Transfuse PRBC for Hb. 7.0. Pulmonary consult noted.  06/23/2020 : C/O SOB. S/P transfusion. Hb still 7.7. Add Solu medrol.  Minimal rhonchi bilateral. Continue Lasix. Follow CBC. Pulmonary F/U.  06/24/2020 : Tachycardia and dyspnea on exertion. Denies any pain. Occult blood stool requested. Pulmonary F/U noted.

## 2020-06-25 LAB
INR BLD: 2.35 RATIO — HIGH (ref 0.88–1.16)
PROTHROM AB SERPL-ACNC: 27 SEC — HIGH (ref 10–12.9)

## 2020-06-25 RX ORDER — WARFARIN SODIUM 2.5 MG/1
4 TABLET ORAL ONCE
Refills: 0 | Status: COMPLETED | OUTPATIENT
Start: 2020-06-25 | End: 2020-06-25

## 2020-06-25 RX ORDER — OXYMETAZOLINE HYDROCHLORIDE 0.5 MG/ML
1 SPRAY NASAL ONCE
Refills: 0 | Status: DISCONTINUED | OUTPATIENT
Start: 2020-06-25 | End: 2020-06-25

## 2020-06-25 RX ORDER — FUROSEMIDE 40 MG
40 TABLET ORAL DAILY
Refills: 0 | Status: DISCONTINUED | OUTPATIENT
Start: 2020-06-25 | End: 2020-06-30

## 2020-06-25 RX ADMIN — LIDOCAINE 1 PATCH: 4 CREAM TOPICAL at 12:27

## 2020-06-25 RX ADMIN — PANTOPRAZOLE SODIUM 40 MILLIGRAM(S): 20 TABLET, DELAYED RELEASE ORAL at 07:01

## 2020-06-25 RX ADMIN — LIDOCAINE 1 PATCH: 4 CREAM TOPICAL at 20:00

## 2020-06-25 RX ADMIN — Medication 3 MILLILITER(S): at 05:15

## 2020-06-25 RX ADMIN — Medication 3 MILLILITER(S): at 11:28

## 2020-06-25 RX ADMIN — LISINOPRIL 20 MILLIGRAM(S): 2.5 TABLET ORAL at 05:38

## 2020-06-25 RX ADMIN — WARFARIN SODIUM 4 MILLIGRAM(S): 2.5 TABLET ORAL at 21:10

## 2020-06-25 RX ADMIN — Medication 3 MILLILITER(S): at 17:12

## 2020-06-25 RX ADMIN — Medication 100 MILLIGRAM(S): at 12:27

## 2020-06-25 RX ADMIN — Medication 40 MILLIGRAM(S): at 05:38

## 2020-06-25 RX ADMIN — Medication 40 MILLIGRAM(S): at 05:39

## 2020-06-25 RX ADMIN — Medication 20 MILLIGRAM(S): at 13:52

## 2020-06-25 RX ADMIN — Medication 0.12 MILLIGRAM(S): at 05:38

## 2020-06-25 RX ADMIN — SIMVASTATIN 5 MILLIGRAM(S): 20 TABLET, FILM COATED ORAL at 21:10

## 2020-06-25 RX ADMIN — Medication 20 MILLIGRAM(S): at 21:10

## 2020-06-25 RX ADMIN — Medication 25 MILLIGRAM(S): at 05:38

## 2020-06-25 NOTE — CHART NOTE - NSCHARTNOTEFT_GEN_A_CORE
Called to evaluate patient who has active epistaxis . Upon arrival pt sitting on a chair with a 4/4 packing to the right nostril . Pt A&O x 3 and reports that she is on daily coumadin and this has happened to her in the past multiple times, sometimes much more bleeding than other times. Pt has copious amount of blood with a few clots in a napkin on the desk. Upon assessment , removed the 4/4 packing and observed no further bleeding . Stayed with patient for approximately 20 minutes to see if further bleeding occurs. Pt was able to breathe Via B/L nostrils without any obstruction . Oxygen delivery switched to humidified O2 . Pt in NAD . Reassured and adviced to have me paged if it reoccurs. VSS and pt is hemodynamically stable at present .

## 2020-06-25 NOTE — PROGRESS NOTE ADULT - ASSESSMENT
IMPROVE VTE Individual Risk Assessment    RISK                                                                Points    [  ] Previous VTE                                                  3    [  ] Thrombophilia                                               2    [  ] Lower limb paralysis                                      2        (unable to hold up >15 seconds)      [  ] Current Cancer                                              2         (within 6 months)    [  ] Immobilization > 24 hrs                                1    [  ] ICU/CCU stay > 24 hours                              1    [  ] Age > 60                                                      1  2  IMPROVE VTE Score _________    IMPROVE Score 0-1: Low Risk, No VTE prophylaxis required for most patients, encourage ambulation.   IMPROVE Score 2-3: At risk, pharmacologic VTE prophylaxis is indicated for most patients (in the absence of a contraindication)  IMPROVE Score > or = 4: High Risk, pharmacologic VTE prophylaxis is indicated for most patients (in the absence of a contraindication)  On Coumadin    70 y/o CHF, COPD on home O2 3L, HTN, Gout, and pulmonary HTN, afib, presents with sob x 2 weeks, worse in past week. denies fever, body aches, cough, cp. denies black/bloody BM. Stool Occult blood negative.  06/21/2020 : Pt. alert. Breathing improved. Hb 6.2, Has Antibodies, having difficulty with type and X-match. Continue present.  06/22/2020 : Alert. Breathing better. Transfuse PRBC for Hb. 7.0. Pulmonary consult noted.  06/23/2020 : C/O SOB. S/P transfusion. Hb still 7.7. Add Solu medrol.  Minimal rhonchi bilateral. Continue Lasix. Follow CBC. Pulmonary F/U.  06/24/2020 : Tachycardia and dyspnea on exertion. Denies any pain. Occult blood stool requested. Pulmonary F/U noted.  06/25/2020 : Alert. asymptomatic.  Steroid and diuretic reduced. Hb.8.5. stool occult blood negative. Continue Present.

## 2020-06-25 NOTE — PROGRESS NOTE ADULT - SUBJECTIVE AND OBJECTIVE BOX
INTERVAL HPI:   69 year female with HTN, A fib, Mitral & Tricuspid valve replacement, pHTN, Diastolic CHF, COPD(never smoked but reports 2nd hand exposure from ), Chronic hypoxic hypercarbic Respiratory failure  on home O2, Obesity, MIGUEL(did not tolerate CPAP), pHTN, Gout, Anemia, Renal Insuffiencey  Presented  with sob x 2 weeks, which got  worst  in past week. SOB more with exertion and in supine position, better at rest.  Denies fever, body aches, Significant new cough or sputum production, cp, black/bloody BM.    OVERNIGHT EVENTS:  Resting, arouse able.    Vital Signs Last 24 Hrs  T(C): 36.7 (25 Jun 2020 10:45), Max: 36.8 (25 Jun 2020 05:09)  T(F): 98 (25 Jun 2020 10:45), Max: 98.2 (25 Jun 2020 05:09)  HR: 79 (25 Jun 2020 11:31) (70 - 91)  BP: 155/75 (25 Jun 2020 10:45) (119/62 - 155/75)  BP(mean): --  RR: 18 (25 Jun 2020 10:45) (16 - 18)  SpO2: 98% (25 Jun 2020 11:31) (93% - 99%)    PHYSICAL EXAM:  GEN:         responsive and comfortable.  HEENT:    Normal.    RESP:         no wheezing.  CVS:          Regular rate and rhythm.   ABD:         Soft, non-tender, non-distended;     MEDICATIONS  (STANDING):  albuterol/ipratropium for Nebulization 3 milliLiter(s) Nebulizer every 6 hours  allopurinol 100 milliGRAM(s) Oral daily  digoxin     Tablet 0.125 milliGRAM(s) Oral daily  furosemide    Tablet 40 milliGRAM(s) Oral two times a day  lidocaine   Patch 1 Patch Transdermal daily  lisinopril 20 milliGRAM(s) Oral daily  methylPREDNISolone sodium succinate Injectable 40 milliGRAM(s) IV Push three times a day  metoprolol succinate ER 25 milliGRAM(s) Oral daily  pantoprazole    Tablet 40 milliGRAM(s) Oral before breakfast  simvastatin 5 milliGRAM(s) Oral at bedtime    LABS:                        8.5    9.77  )-----------( 474      ( 24 Jun 2020 06:16 )             31.2     06-24    135  |  94<L>  |  57<H>  ----------------------------<  142<H>  4.6   |  36<H>  |  1.49<H>    Ca    9.8      24 Jun 2020 06:16    PT/INR - ( 25 Jun 2020 07:46 )   PT: 27.0 sec;   INR: 2.35 ratio      06-20 @ 15:55  pH: 7.45  pCO2: 55  pO2: 88  SaO2: 96    ASSESSMENT AND PLAN:  ·	Acute on chronic hypoxic hypercarbic Respiratory failure.  ·	Acute on chronic diastolic CHF.  ·	Acute COPD exacerbation.  ·	Obesity.  ·	MIGUEL, did not tolerate CPAP.  ·	Atrial fibrillation.  ·	pHTN.  ·	S/P Mitral and Tricuspid Valve replacement.  ·	Suspect GI bleed.  ·	Renal Insuffiencey.    Will reduce steroids and diuretics.  Continue O2, nebulizer and Coumadin.

## 2020-06-25 NOTE — PROGRESS NOTE ADULT - SUBJECTIVE AND OBJECTIVE BOX
Patient is a 69y old  Female who presents with a chief complaint of VILLA (25 Jun 2020 11:43)      INTERVAL HPI/OVERNIGHT EVENTS:    MEDICATIONS  (STANDING):  albuterol/ipratropium for Nebulization 3 milliLiter(s) Nebulizer every 6 hours  allopurinol 100 milliGRAM(s) Oral daily  digoxin     Tablet 0.125 milliGRAM(s) Oral daily  furosemide    Tablet 40 milliGRAM(s) Oral daily  lidocaine   Patch 1 Patch Transdermal daily  lisinopril 20 milliGRAM(s) Oral daily  methylPREDNISolone sodium succinate Injectable 20 milliGRAM(s) IV Push every 8 hours  metoprolol succinate ER 25 milliGRAM(s) Oral daily  pantoprazole    Tablet 40 milliGRAM(s) Oral before breakfast  simvastatin 5 milliGRAM(s) Oral at bedtime  warfarin 4 milliGRAM(s) Oral once    MEDICATIONS  (PRN):      Allergies    No Known Allergies    Intolerances        REVIEW OF SYSTEMS:  CONSTITUTIONAL: No fever, weight loss, or fatigue  EYES: No eye pain, visual disturbances, or discharge  ENMT:  No difficulty hearing, tinnitus, vertigo; No sinus or throat pain  NECK: No pain or stiffness  BREASTS: No pain, masses, or nipple discharge  RESPIRATORY: No cough, wheezing, chills or hemoptysis; No shortness of breath  CARDIOVASCULAR: No chest pain, palpitations, dizziness, or leg swelling  GASTROINTESTINAL: No abdominal or epigastric pain. No nausea, vomiting, or hematemesis; No diarrhea or constipation. No melena or hematochezia.  GENITOURINARY: No dysuria, frequency, hematuria, or incontinence  NEUROLOGICAL: No headaches, memory loss, loss of strength, numbness, or tremors  SKIN: No itching, burning, rashes, or lesions   LYMPH NODES: No enlarged glands  ENDOCRINE: No heat or cold intolerance; No hair loss  MUSCULOSKELETAL: No joint pain or swelling; No muscle, back, or extremity pain  PSYCHIATRIC: No depression, anxiety, mood swings, or difficulty sleeping  HEME/LYMPH: No easy bruising, or bleeding gums  ALLERGY AND IMMUNOLOGIC: No hives or eczema    Vital Signs Last 24 Hrs  T(C): 36.7 (25 Jun 2020 10:45), Max: 36.8 (25 Jun 2020 05:09)  T(F): 98 (25 Jun 2020 10:45), Max: 98.2 (25 Jun 2020 05:09)  HR: 79 (25 Jun 2020 11:31) (70 - 91)  BP: 155/75 (25 Jun 2020 10:45) (119/62 - 155/75)  BP(mean): --  RR: 18 (25 Jun 2020 10:45) (16 - 18)  SpO2: 98% (25 Jun 2020 11:31) (93% - 99%)    PHYSICAL EXAM:  GENERAL: NAD, well-groomed,Obese  HEAD:  Atraumatic, Normocephalic  EYES: EOMI, PERRL, conjunctiva and sclera clear  ENMT:  Moist mucous membranes, Good dentition, No lesions  NECK: Supple, No JVD, Normal thyroid  NERVOUS SYSTEM:  Alert & Oriented X 3, Good concentration; Motor Strength 5/5 B/L upper and lower extremities; DTRs 2+ intact and symmetric  CHEST/LUNG: Clear to percussion bilaterally; No rales, rhonchi, wheezing, or rubs  HEART: Regular rate and rhythm; No murmurs, rubs, or gallops  ABDOMEN: Soft, Nontender, Nondistended; Bowel sounds present  EXTREMITIES:  2+ Peripheral Pulses, No clubbing, cyanosis, or edema  LYMPH: No lymphadenopathy noted  SKIN: No rashes or lesions    LABS:                        8.5    9.77  )-----------( 474      ( 24 Jun 2020 06:16 )             31.2     06-24    135  |  94<L>  |  57<H>  ----------------------------<  142<H>  4.6   |  36<H>  |  1.49<H>    Ca    9.8      24 Jun 2020 06:16      PT/INR - ( 25 Jun 2020 07:46 )   PT: 27.0 sec;   INR: 2.35 ratio             CAPILLARY BLOOD GLUCOSE                        RADIOLOGY & ADDITIONAL TESTS:    Imaging Personally Reviewed:  [ ] YES  [ ] NO    Consultant(s) Notes Reviewed:  [ ] YES  [ ] NO    Care Discussed with Consultants/Other Providers [ ] YES  [ ] NO    PROBLEMS:  COPD EXACERBATION;  CONGESTIVE HEART FAILURE;  ANEMIA  SHORTNESS OF BREATH  COPD exacerbation  Longstanding persistent atrial fibrillation  Essential hypertension  Gout  Other iron deficiency anemia  COPD exacerbation  Acute on chronic systolic congestive heart failure      Care discussed with family,         [  ]   yes  [  ]  No    imp:    stable[ ]    unstable[  ]     improving [   ]       unchanged  [  ]                Plans:  Continue present plans  [  ]               New consult [  ]   specialty  .......               order maribell[  ]    test name.                  Discharge Planning  [  ]

## 2020-06-26 LAB
BLD GP AB SCN SERPL QL: SIGNIFICANT CHANGE UP
HCT VFR BLD CALC: 30.4 % — LOW (ref 34.5–45)
HGB BLD-MCNC: 8.3 G/DL — LOW (ref 11.5–15.5)
INR BLD: 2.88 RATIO — HIGH (ref 0.88–1.16)
MCHC RBC-ENTMCNC: 22.3 PG — LOW (ref 27–34)
MCHC RBC-ENTMCNC: 27.3 GM/DL — LOW (ref 32–36)
MCV RBC AUTO: 81.7 FL — SIGNIFICANT CHANGE UP (ref 80–100)
NRBC # BLD: 0 /100 WBCS — SIGNIFICANT CHANGE UP (ref 0–0)
PLATELET # BLD AUTO: 471 K/UL — HIGH (ref 150–400)
PROTHROM AB SERPL-ACNC: 33 SEC — HIGH (ref 10–12.9)
RBC # BLD: 3.72 M/UL — LOW (ref 3.8–5.2)
RBC # FLD: 21.2 % — HIGH (ref 10.3–14.5)
WBC # BLD: 10.79 K/UL — HIGH (ref 3.8–10.5)
WBC # FLD AUTO: 10.79 K/UL — HIGH (ref 3.8–10.5)

## 2020-06-26 RX ORDER — WARFARIN SODIUM 2.5 MG/1
4 TABLET ORAL ONCE
Refills: 0 | Status: COMPLETED | OUTPATIENT
Start: 2020-06-26 | End: 2020-06-26

## 2020-06-26 RX ADMIN — Medication 0.12 MILLIGRAM(S): at 05:37

## 2020-06-26 RX ADMIN — LIDOCAINE 1 PATCH: 4 CREAM TOPICAL at 23:00

## 2020-06-26 RX ADMIN — LIDOCAINE 1 PATCH: 4 CREAM TOPICAL at 19:13

## 2020-06-26 RX ADMIN — Medication 25 MILLIGRAM(S): at 05:37

## 2020-06-26 RX ADMIN — WARFARIN SODIUM 4 MILLIGRAM(S): 2.5 TABLET ORAL at 22:13

## 2020-06-26 RX ADMIN — Medication 3 MILLILITER(S): at 17:30

## 2020-06-26 RX ADMIN — Medication 40 MILLIGRAM(S): at 05:37

## 2020-06-26 RX ADMIN — Medication 3 MILLILITER(S): at 11:48

## 2020-06-26 RX ADMIN — Medication 20 MILLIGRAM(S): at 22:14

## 2020-06-26 RX ADMIN — LISINOPRIL 20 MILLIGRAM(S): 2.5 TABLET ORAL at 05:37

## 2020-06-26 RX ADMIN — LIDOCAINE 1 PATCH: 4 CREAM TOPICAL at 11:51

## 2020-06-26 RX ADMIN — LIDOCAINE 1 PATCH: 4 CREAM TOPICAL at 00:00

## 2020-06-26 RX ADMIN — Medication 100 MILLIGRAM(S): at 11:51

## 2020-06-26 RX ADMIN — Medication 20 MILLIGRAM(S): at 05:36

## 2020-06-26 RX ADMIN — PANTOPRAZOLE SODIUM 40 MILLIGRAM(S): 20 TABLET, DELAYED RELEASE ORAL at 05:37

## 2020-06-26 RX ADMIN — SIMVASTATIN 5 MILLIGRAM(S): 20 TABLET, FILM COATED ORAL at 22:13

## 2020-06-26 RX ADMIN — Medication 20 MILLIGRAM(S): at 13:02

## 2020-06-26 RX ADMIN — Medication 3 MILLILITER(S): at 00:12

## 2020-06-26 RX ADMIN — Medication 3 MILLILITER(S): at 05:50

## 2020-06-26 NOTE — PROGRESS NOTE ADULT - SUBJECTIVE AND OBJECTIVE BOX
Patient is a 69y old  Female who presents with a chief complaint of VILLA (25 Jun 2020 11:52)      INTERVAL HPI/OVERNIGHT EVENTS:    MEDICATIONS  (STANDING):  albuterol/ipratropium for Nebulization 3 milliLiter(s) Nebulizer every 6 hours  allopurinol 100 milliGRAM(s) Oral daily  digoxin     Tablet 0.125 milliGRAM(s) Oral daily  furosemide    Tablet 40 milliGRAM(s) Oral daily  lidocaine   Patch 1 Patch Transdermal daily  lisinopril 20 milliGRAM(s) Oral daily  methylPREDNISolone sodium succinate Injectable 20 milliGRAM(s) IV Push every 8 hours  metoprolol succinate ER 25 milliGRAM(s) Oral daily  pantoprazole    Tablet 40 milliGRAM(s) Oral before breakfast  simvastatin 5 milliGRAM(s) Oral at bedtime    MEDICATIONS  (PRN):      Allergies    No Known Allergies    Intolerances        REVIEW OF SYSTEMS:  CONSTITUTIONAL: No fever, weight loss, or fatigue  EYES: No eye pain, visual disturbances, or discharge  ENMT:  No difficulty hearing, tinnitus, vertigo; No sinus or throat pain  NECK: No pain or stiffness  BREASTS: No pain, masses, or nipple discharge  RESPIRATORY: No cough, wheezing, chills or hemoptysis; No shortness of breath  CARDIOVASCULAR: No chest pain, palpitations, dizziness, or leg swelling  GASTROINTESTINAL: No abdominal or epigastric pain. No nausea, vomiting, or hematemesis; No diarrhea or constipation. No melena or hematochezia.  GENITOURINARY: No dysuria, frequency, hematuria, or incontinence  NEUROLOGICAL: No headaches, memory loss, loss of strength, numbness, or tremors  SKIN: No itching, burning, rashes, or lesions   LYMPH NODES: No enlarged glands  ENDOCRINE: No heat or cold intolerance; No hair loss  MUSCULOSKELETAL: No joint pain or swelling; No muscle, back, or extremity pain  PSYCHIATRIC: No depression, anxiety, mood swings, or difficulty sleeping  HEME/LYMPH: No easy bruising, or bleeding gums  ALLERGY AND IMMUNOLOGIC: No hives or eczema    Vital Signs Last 24 Hrs  T(C): 36.8 (26 Jun 2020 04:44), Max: 37.1 (25 Jun 2020 23:32)  T(F): 98.2 (26 Jun 2020 04:44), Max: 98.7 (25 Jun 2020 23:32)  HR: 66 (26 Jun 2020 08:00) (66 - 145)  BP: 126/63 (26 Jun 2020 04:44) (123/66 - 155/75)  BP(mean): --  RR: 18 (26 Jun 2020 04:44) (18 - 18)  SpO2: 98% (26 Jun 2020 05:51) (93% - 100%)    PHYSICAL EXAM:  GENERAL: NAD, well-groomed, well-developed  HEAD:  Atraumatic, Normocephalic  EYES: EOMI, PERRL, conjunctiva and sclera clear  ENMT:  Moist mucous membranes, Good dentition, No lesions  NECK: Supple, No JVD, Normal thyroid  NERVOUS SYSTEM:  Alert & Oriented X3, Good concentration; Motor Strength 5/5 B/L upper and lower extremities; DTRs 2+ intact and symmetric  CHEST/LUNG: Clear to percussion bilaterally; No rales, rhonchi, wheezing, or rubs  HEART: Regular rate and rhythm; No murmurs, rubs, or gallops  ABDOMEN: Soft, Nontender, Nondistended; Bowel sounds present  EXTREMITIES:  2+ Peripheral Pulses, No clubbing, cyanosis, or edema  LYMPH: No lymphadenopathy noted  SKIN: No rashes or lesions    LABS:                        8.3    10.79 )-----------( 471      ( 26 Jun 2020 09:44 )             30.4           PT/INR - ( 26 Jun 2020 07:37 )   PT: 33.0 sec;   INR: 2.88 ratio             CAPILLARY BLOOD GLUCOSE                        RADIOLOGY & ADDITIONAL TESTS:    Imaging Personally Reviewed:  [ ] YES  [ ] NO    Consultant(s) Notes Reviewed:  [ ] YES  [ ] NO    Care Discussed with Consultants/Other Providers [ ] YES  [ ] NO    PROBLEMS:  COPD EXACERBATION;  CONGESTIVE HEART FAILURE;  ANEMIA  SHORTNESS OF BREATH  COPD exacerbation  Longstanding persistent atrial fibrillation  Essential hypertension  Gout  Other iron deficiency anemia  COPD exacerbation  Acute on chronic systolic congestive heart failure      Care discussed with family,         [  ]   yes  [  ]  No    imp:    stable[ ]    unstable[  ]     improving [   ]       unchanged  [  ]                Plans:  Continue present plans  [  ]               New consult [  ]   specialty  .......               order maribell[  ]    test name.                  Discharge Planning  [  ]

## 2020-06-26 NOTE — OCCUPATIONAL THERAPY INITIAL EVALUATION ADULT - ANTICIPATED DISCHARGE DISPOSITION, OT EVAL
Home with OT referral to prevent falls, improve balance, muscle strength, and endurance in order for the pt to perform ADL management and functional mobility in a safe manner.

## 2020-06-26 NOTE — PROGRESS NOTE ADULT - ASSESSMENT
IMPROVE VTE Individual Risk Assessment    RISK                                                                Points    [  ] Previous VTE                                                  3    [  ] Thrombophilia                                               2    [  ] Lower limb paralysis                                      2        (unable to hold up >15 seconds)      [  ] Current Cancer                                              2         (within 6 months)    [  ] Immobilization > 24 hrs                                1    [  ] ICU/CCU stay > 24 hours                              1    [  ] Age > 60                                                      1  2  IMPROVE VTE Score _________    IMPROVE Score 0-1: Low Risk, No VTE prophylaxis required for most patients, encourage ambulation.   IMPROVE Score 2-3: At risk, pharmacologic VTE prophylaxis is indicated for most patients (in the absence of a contraindication)  IMPROVE Score > or = 4: High Risk, pharmacologic VTE prophylaxis is indicated for most patients (in the absence of a contraindication)  On Coumadin    68 y/o CHF, COPD on home O2 3L, HTN, Gout, and pulmonary HTN, afib, presents with sob x 2 weeks, worse in past week. denies fever, body aches, cough, cp. denies black/bloody BM. Stool Occult blood negative.  06/21/2020 : Pt. alert. Breathing improved. Hb 6.2, Has Antibodies, having difficulty with type and X-match. Continue present.  06/22/2020 : Alert. Breathing better. Transfuse PRBC for Hb. 7.0. Pulmonary consult noted.  06/23/2020 : C/O SOB. S/P transfusion. Hb still 7.7. Add Solu medrol.  Minimal rhonchi bilateral. Continue Lasix. Follow CBC. Pulmonary F/U.  06/24/2020 : Tachycardia and dyspnea on exertion. Denies any pain. Occult blood stool requested. Pulmonary F/U noted.  06/25/2020 : Alert. asymptomatic.  Steroid and diuretic reduced. Hb.8.5. stool occult blood negative. Continue Present.  06/26/2020 : Alert. No SOB. C/O mild abdominal pain. Continue Steroid and Lasix. Pulmonary F/U noted.

## 2020-06-26 NOTE — OCCUPATIONAL THERAPY INITIAL EVALUATION ADULT - LOWER BODY DRESSING, PREVIOUS LEVEL OF FUNCTION, OT EVAL
Compound Hyperopic Astigmatism OU w/Presbyopia-  discussed findings w/patient-  new spectacle Rx issued-  continue to monitor yearly or prnCataracts OU-  discussed findings w/patient-  no treatment indicated at this time-  UV protection recommended-  continue to monitor yearly or prnDES OU-  discussed findings w/patient-  patient's signs/symptoms are worsening with current treatment-  start Restasis BID OU Rx sent to [facility]-  continue AT's PRN OU-  continue bedtime nj QHS OU-  RTC 1 year or prn; 's Notes: MR 11/27/2019DFE 11/27/2019
needed assist

## 2020-06-26 NOTE — OCCUPATIONAL THERAPY INITIAL EVALUATION ADULT - PERTINENT HX OF CURRENT PROBLEM, REHAB EVAL
Pt presented to ER due  with C/O SOB. Pt is diagnosed with COPD exacerbation CHF and anemia.  CXR on 6/20/2020  results confirm  Cardiomegaly. Perihilar interstitial lung ill-defined opacities/infiltrates. Constellation of findings suggestive of CHF..

## 2020-06-26 NOTE — OCCUPATIONAL THERAPY INITIAL EVALUATION ADULT - SOCIAL CONCERNS
Complex psychosocial needs/coping issues/Pt voiced concerns about the limited ability to ambulate and care for herself. Pt would like a HHA to assist her at home.

## 2020-06-26 NOTE — OCCUPATIONAL THERAPY INITIAL EVALUATION ADULT - PLANNED THERAPY INTERVENTIONS, OT EVAL
energy conservation techniques/neuromuscular re-education/strengthening/IADL retraining/balance training/bed mobility training/motor coordination training/parent/caregiver training.../transfer training/ADL retraining

## 2020-06-26 NOTE — OCCUPATIONAL THERAPY INITIAL EVALUATION ADULT - ADDITIONAL COMMENTS
Prior to admission, pt was functioning in her roles, self sufficient & ambulating independently with rolling walker/ SAC. Pt uses home Oxygen . Presently pt needs assistance with lower body self care tasks due to generalized, weakness, and poor endurance. Pt is right hand dominant and wears glasses for reading.

## 2020-06-26 NOTE — OCCUPATIONAL THERAPY INITIAL EVALUATION ADULT - IMPAIRMENTS CONTRIBUTING IMPAIRED BED MOBILITY, REHAB EVAL
poor  endurance/decreased ROM/decreased strength/narrow base of support/impaired balance/impaired coordination

## 2020-06-26 NOTE — PROGRESS NOTE ADULT - SUBJECTIVE AND OBJECTIVE BOX
INTERVAL HPI:   69 year female with HTN, A fib, Mitral & Tricuspid valve replacement, pHTN, Diastolic CHF, COPD(never smoked but reports 2nd hand exposure from ), Chronic hypoxic hypercarbic Respiratory failure  on home O2, Obesity, MIGUEL(did not tolerate CPAP), pHTN, Gout, Anemia, Renal Insuffiencey  Presented  with sob x 2 weeks, which got  worst  in past week. SOB more with exertion and in supine position, better at rest.  Denies fever, body aches, Significant new cough or sputum production, cp, black/bloody BM.    OVERNIGHT EVENTS:  Out of bed to chair and comfortable.    Vital Signs Last 24 Hrs  T(C): 36.9 (26 Jun 2020 10:50), Max: 37.1 (25 Jun 2020 23:32)  T(F): 98.5 (26 Jun 2020 10:50), Max: 98.7 (25 Jun 2020 23:32)  HR: 77 (26 Jun 2020 11:48) (62 - 145)  BP: 145/76 (26 Jun 2020 11:00) (118/62 - 145/76)  BP(mean): --  RR: 20 (26 Jun 2020 10:50) (18 - 20)  SpO2: 98% (26 Jun 2020 11:48) (93% - 100%)    PHYSICAL EXAM:  GEN:         Awake, responsive and comfortable.  HEENT:    Normal.    RESP:         no wheezing.  CVS:           Regular rate and rhythm.   ABD:         Soft, non-tender, non-distended;     MEDICATIONS  (STANDING):  albuterol/ipratropium for Nebulization 3 milliLiter(s) Nebulizer every 6 hours  allopurinol 100 milliGRAM(s) Oral daily  digoxin     Tablet 0.125 milliGRAM(s) Oral daily  furosemide    Tablet 40 milliGRAM(s) Oral daily  lidocaine   Patch 1 Patch Transdermal daily  lisinopril 20 milliGRAM(s) Oral daily  methylPREDNISolone sodium succinate Injectable 20 milliGRAM(s) IV Push every 8 hours  metoprolol succinate ER 25 milliGRAM(s) Oral daily  pantoprazole    Tablet 40 milliGRAM(s) Oral before breakfast  simvastatin 5 milliGRAM(s) Oral at bedtime  warfarin 4 milliGRAM(s) Oral once    LABS:                        8.3    10.79 )-----------( 471      ( 26 Jun 2020 09:44 )             30.4   PT/INR - ( 26 Jun 2020 07:37 )   PT: 33.0 sec;   INR: 2.88 ratio      06-20 @ 15:55  pH: 7.45  pCO2: 55  pO2: 88  SaO2: 96    ASSESSMENT AND PLAN:  ·	Acute on chronic hypoxic hypercarbic Respiratory failure.  ·	Acute on chronic diastolic CHF.  ·	Acute COPD exacerbation.  ·	Obesity.  ·	MIGUEL, did not tolerate CPAP.  ·	Atrial fibrillation.  ·	pHTN.  ·	S/P Mitral and Tricuspid Valve replacement.  ·	Suspect GI bleed.  ·	Renal Insuffiencey.    Slow steroid taper,  Continue O2, nebulizer and diuretics.

## 2020-06-26 NOTE — OCCUPATIONAL THERAPY INITIAL EVALUATION ADULT - BALANCE TRAINING, PT EVAL
Pt will increased standing balance from fair+ to good in 3 days to prevent falls, optimize pt's ability for ADL management & safely navigate in all terrains

## 2020-06-26 NOTE — OCCUPATIONAL THERAPY INITIAL EVALUATION ADULT - LIVES WITH, PROFILE
children/spouse/and children in a prive house with 4 entry steps bilateral hand rails that cannot be reached simultaneously, There are additional steps with rail to the upstairs and the basement, but pt stays on the main floor. Most living amenities are located on one level. The bathroom has a tub/shower combination, retractable shower, and standard toilet. spouse/and children in a prive house with 4 entry steps bilateral hand rails that cannot be reached simultaneously, There are additional steps with rails to the upstairs and the basement, but pt stays on the main floor. Most living amenities are located on one level. The bathroom has a tub/shower combination, retractable shower, and standard toilet./children

## 2020-06-26 NOTE — OCCUPATIONAL THERAPY INITIAL EVALUATION ADULT - GENERAL OBSERVATIONS, REHAB EVAL
Pt was seen for initial OT consult, encountered OOB to chair on cardiac monitoring and suppemental O2 via nasal canula; pt was AA&Ox4, cooperative & followed commands. Pt denied pain, numbness, SOB or tingling. Pt presents with decreased endurance;  this limits pt's activity tolerance ,balance, ADL management and functional mobility. Pt was seen for initial OT consult, encountered OOB to chair on cardiac monitoring and supplemental O2 via nasal canula; pt was AA&Ox4, cooperative & followed commands. Pt denied pain, numbness, SOB or tingling. Pt presents with decreased endurance;  this limits pt's activity tolerance ,balance, ADL management and functional mobility.

## 2020-06-27 LAB
INR BLD: 2.89 RATIO — HIGH (ref 0.88–1.16)
PROTHROM AB SERPL-ACNC: 33.5 SEC — HIGH (ref 10–12.9)

## 2020-06-27 RX ORDER — WARFARIN SODIUM 2.5 MG/1
3 TABLET ORAL ONCE
Refills: 0 | Status: COMPLETED | OUTPATIENT
Start: 2020-06-27 | End: 2020-06-27

## 2020-06-27 RX ADMIN — Medication 30 MILLIGRAM(S): at 12:35

## 2020-06-27 RX ADMIN — Medication 3 MILLILITER(S): at 06:01

## 2020-06-27 RX ADMIN — Medication 3 MILLILITER(S): at 00:04

## 2020-06-27 RX ADMIN — Medication 100 MILLIGRAM(S): at 11:31

## 2020-06-27 RX ADMIN — Medication 40 MILLIGRAM(S): at 05:04

## 2020-06-27 RX ADMIN — LIDOCAINE 1 PATCH: 4 CREAM TOPICAL at 23:13

## 2020-06-27 RX ADMIN — Medication 25 MILLIGRAM(S): at 05:04

## 2020-06-27 RX ADMIN — SIMVASTATIN 5 MILLIGRAM(S): 20 TABLET, FILM COATED ORAL at 21:11

## 2020-06-27 RX ADMIN — LIDOCAINE 1 PATCH: 4 CREAM TOPICAL at 11:32

## 2020-06-27 RX ADMIN — Medication 3 MILLILITER(S): at 12:24

## 2020-06-27 RX ADMIN — WARFARIN SODIUM 3 MILLIGRAM(S): 2.5 TABLET ORAL at 21:12

## 2020-06-27 RX ADMIN — Medication 3 MILLILITER(S): at 17:30

## 2020-06-27 RX ADMIN — Medication 0.12 MILLIGRAM(S): at 05:05

## 2020-06-27 RX ADMIN — Medication 3 MILLILITER(S): at 23:18

## 2020-06-27 RX ADMIN — LIDOCAINE 1 PATCH: 4 CREAM TOPICAL at 20:30

## 2020-06-27 RX ADMIN — Medication 20 MILLIGRAM(S): at 05:05

## 2020-06-27 RX ADMIN — PANTOPRAZOLE SODIUM 40 MILLIGRAM(S): 20 TABLET, DELAYED RELEASE ORAL at 05:04

## 2020-06-27 RX ADMIN — LISINOPRIL 20 MILLIGRAM(S): 2.5 TABLET ORAL at 05:04

## 2020-06-27 NOTE — PROGRESS NOTE ADULT - SUBJECTIVE AND OBJECTIVE BOX
Patient is a 69y old  Female who presents with a chief complaint of VILLA (26 Jun 2020 15:29)      INTERVAL HPI/OVERNIGHT EVENTS:    MEDICATIONS  (STANDING):  albuterol/ipratropium for Nebulization 3 milliLiter(s) Nebulizer every 6 hours  allopurinol 100 milliGRAM(s) Oral daily  digoxin     Tablet 0.125 milliGRAM(s) Oral daily  furosemide    Tablet 40 milliGRAM(s) Oral daily  lidocaine   Patch 1 Patch Transdermal daily  lisinopril 20 milliGRAM(s) Oral daily  methylPREDNISolone sodium succinate Injectable 20 milliGRAM(s) IV Push every 8 hours  metoprolol succinate ER 25 milliGRAM(s) Oral daily  pantoprazole    Tablet 40 milliGRAM(s) Oral before breakfast  simvastatin 5 milliGRAM(s) Oral at bedtime    MEDICATIONS  (PRN):      Allergies    No Known Allergies    Intolerances        REVIEW OF SYSTEMS:  CONSTITUTIONAL: No fever, weight loss, or fatigue  EYES: No eye pain, visual disturbances, or discharge  ENMT:  No difficulty hearing, tinnitus, vertigo; No sinus or throat pain  NECK: No pain or stiffness  BREASTS: No pain, masses, or nipple discharge  RESPIRATORY: No cough, wheezing, chills or hemoptysis; No shortness of breath  CARDIOVASCULAR: No chest pain, palpitations, dizziness, or leg swelling  GASTROINTESTINAL: No abdominal or epigastric pain. No nausea, vomiting, or hematemesis; No diarrhea or constipation. No melena or hematochezia.  GENITOURINARY: No dysuria, frequency, hematuria, or incontinence  NEUROLOGICAL: No headaches, memory loss, loss of strength, numbness, or tremors  SKIN: No itching, burning, rashes, or lesions   LYMPH NODES: No enlarged glands  ENDOCRINE: No heat or cold intolerance; No hair loss  MUSCULOSKELETAL: No joint pain or swelling; No muscle, back, or extremity pain  PSYCHIATRIC: No depression, anxiety, mood swings, or difficulty sleeping  HEME/LYMPH: No easy bruising, or bleeding gums  ALLERGY AND IMMUNOLOGIC: No hives or eczema    Vital Signs Last 24 Hrs  T(C): 36.9 (27 Jun 2020 04:36), Max: 36.9 (27 Jun 2020 04:36)  T(F): 98.4 (27 Jun 2020 04:36), Max: 98.4 (27 Jun 2020 04:36)  HR: 71 (27 Jun 2020 08:00) (71 - 99)  BP: 112/70 (27 Jun 2020 04:36) (112/70 - 135/63)  BP(mean): --  RR: 18 (27 Jun 2020 04:36) (16 - 18)  SpO2: 98% (27 Jun 2020 06:20) (98% - 99%)    PHYSICAL EXAM:  GENERAL: NAD, Obese  HEAD:  Atraumatic, Normocephalic  EYES: EOMI, PERRL, conjunctiva and sclera clear  ENMT:  Moist mucous membranes, Good dentition, No lesions  NECK: Supple, No JVD, Normal thyroid  NERVOUS SYSTEM:  Alert & Oriented X3, Good concentration; Motor Strength 5/5 B/L upper and lower extremities; DTRs 2+ intact and symmetric  CHEST/LUNG: Clear to percussion bilaterally; No rales, rhonchi, wheezing, or rubs  HEART: Regular rate and rhythm; No murmurs, rubs, or gallops  ABDOMEN: Soft, Nontender, Nondistended; Bowel sounds present. Obese  EXTREMITIES:  2+ Peripheral Pulses, No clubbing, cyanosis, or edema  LYMPH: No lymphadenopathy noted  SKIN: No rashes or lesions    LABS:                        8.3    10.79 )-----------( 471      ( 26 Jun 2020 09:44 )             30.4           PT/INR - ( 27 Jun 2020 07:55 )   PT: 33.5 sec;   INR: 2.89 ratio             CAPILLARY BLOOD GLUCOSE      RADIOLOGY & ADDITIONAL TESTS:    Imaging Personally Reviewed:  [ ] YES  [ ] NO    Consultant(s) Notes Reviewed:  [ ] YES  [ ] NO    Care Discussed with Consultants/Other Providers [ ] YES  [ ] NO    PROBLEMS:  COPD EXACERBATION;  CONGESTIVE HEART FAILURE;  ANEMIA  SHORTNESS OF BREATH  COPD exacerbation  Longstanding persistent atrial fibrillation  Essential hypertension  Gout  Other iron deficiency anemia  COPD exacerbation  Acute on chronic systolic congestive heart failure      Care discussed with family,         [  ]   yes  [  ]  No    imp:    stable[ ]    unstable[  ]     improving [   ]       unchanged  [  ]                Plans:  Continue present plans  [  ]               New consult [  ]   specialty  .......               order maribell[  ]    test name.                  Discharge Planning  [  ]

## 2020-06-27 NOTE — CHART NOTE - NSCHARTNOTEFT_GEN_A_CORE
Follow - up -, Pt w/ acute on chronic systolic CHF ; COPD exacerbation ; other iron deficiency anemia ; gout ; essential HTN ; long standing persistent A-fib. Pt continues on prednisone & Duoneb.     Factors impacting intake: [x ] none [ ] nausea  [ ] vomiting [ ] diarrhea [ ] constipation  [ ]chewing problems [ ] swallowing issues  [ ] other:     6/20 - Diet Prescription: Diet, DASH/TLC:   Sodium & Cholesterol Restricted (06-20-20 @ 19:58)    Intake: 75 - 100%. Pt without c/o at this time.     Current Weight: Weight (kg): 6/27 - 248.6 (112.8 kg) Edema - 6/26 - 2+ (L & R) foot, ankle ; 6/20 - 240 (109.1 kg) Edema - 3+ ( L & R) foot  % Weight Change - 3.4 % /3.7 kg gain x 7 days w/ noted fluid fluctuations.     Physical Appearance - 6/26 - 2+ Edema - (L & R) Foot, Ankle    Pertinent Medications: MEDICATIONS  (STANDING):  albuterol/ipratropium for Nebulization 3 milliLiter(s) Nebulizer every 6 hours  allopurinol 100 milliGRAM(s) Oral daily  digoxin     Tablet 0.125 milliGRAM(s) Oral daily  furosemide    Tablet 40 milliGRAM(s) Oral daily  lidocaine   Patch 1 Patch Transdermal daily  lisinopril 20 milliGRAM(s) Oral daily  metoprolol succinate ER 25 milliGRAM(s) Oral daily  pantoprazole    Tablet 40 milliGRAM(s) Oral before breakfast  predniSONE   Tablet 30 milliGRAM(s) Oral daily  simvastatin 5 milliGRAM(s) Oral at bedtime  warfarin 3 milliGRAM(s) Oral once    MEDICATIONS  (PRN):    Pertinent Labs:  06-20 Alb 3.3 g/dL     CAPILLARY BLOOD GLUCOSE        Skin: WDL    Estimated Needs:   [x ] no change since previous assessment ( 6/22/20 )  [ ] recalculated:     Nutrition Diagnosis:    Nutrition Diagnosis:  Nutrition diagnosis yes...     Nutrition Diagnostic Terminology #1 Inadequate Energy Intake.     Etiology decreased ability to consume sufficient energy.     Signs/Symptoms <75% usual intake > 1 month, 98% usual wt.     Goal/Expected Outcome pt to consume >75% of meals.-> Goal Met        Nutrition Diagnosis is [x ] ongoing  [ ] resolved [ ] not applicable     New Nutrition Diagnosis: [x ] not applicable       Interventions:   Recommend  [ x ] Continue Dash / TLC  [ ] Change Diet To:  [ ] Nutrition Supplement  [ ] Nutrition Support  [x ] Other: Reviewed CHF nutrition therapy ; daily weight monitoring; weight mgt for obesity and encouraged increased intake of high iron food sources.     Monitoring and Evaluation:   [x ] PO intake [ x ] Tolerance to diet prescription [ x ] weights [ x ] labs - check HgbA1c (pt remains on prednisone & duoneb) [ x ] follow up per protocol  [ ] other:

## 2020-06-27 NOTE — PROGRESS NOTE ADULT - ASSESSMENT
IMPROVE VTE Individual Risk Assessment    RISK                                                                Points    [  ] Previous VTE                                                  3    [  ] Thrombophilia                                               2    [  ] Lower limb paralysis                                      2        (unable to hold up >15 seconds)      [  ] Current Cancer                                              2         (within 6 months)    [  ] Immobilization > 24 hrs                                1    [  ] ICU/CCU stay > 24 hours                              1    [  ] Age > 60                                                      1  2  IMPROVE VTE Score _________    IMPROVE Score 0-1: Low Risk, No VTE prophylaxis required for most patients, encourage ambulation.   IMPROVE Score 2-3: At risk, pharmacologic VTE prophylaxis is indicated for most patients (in the absence of a contraindication)  IMPROVE Score > or = 4: High Risk, pharmacologic VTE prophylaxis is indicated for most patients (in the absence of a contraindication)  On Coumadin    68 y/o CHF, COPD on home O2 3L, HTN, Gout, and pulmonary HTN, afib, presents with sob x 2 weeks, worse in past week. denies fever, body aches, cough, cp. denies black/bloody BM. Stool Occult blood negative.  06/21/2020 : Pt. alert. Breathing improved. Hb 6.2, Has Antibodies, having difficulty with type and X-match. Continue present.  06/22/2020 : Alert. Breathing better. Transfuse PRBC for Hb. 7.0. Pulmonary consult noted.  06/23/2020 : C/O SOB. S/P transfusion. Hb still 7.7. Add Solu medrol.  Minimal rhonchi bilateral. Continue Lasix. Follow CBC. Pulmonary F/U.  06/24/2020 : Tachycardia and dyspnea on exertion. Denies any pain. Occult blood stool requested. Pulmonary F/U noted.  06/25/2020 : Alert. asymptomatic.  Steroid and diuretic reduced. Hb.8.5. stool occult blood negative. Continue Present.  06/26/2020 : Alert. No SOB. C/O mild abdominal pain. Continue Steroid and Lasix. Pulmonary F/U noted.  06/27/2020 : Alert. No SOB. Hb stable @8.3. Change to prednisone. Discharge planning.

## 2020-06-28 LAB
INR BLD: 2.84 RATIO — HIGH (ref 0.88–1.16)
PROTHROM AB SERPL-ACNC: 32.6 SEC — HIGH (ref 10–12.9)

## 2020-06-28 RX ADMIN — LISINOPRIL 20 MILLIGRAM(S): 2.5 TABLET ORAL at 05:18

## 2020-06-28 RX ADMIN — Medication 0.12 MILLIGRAM(S): at 05:18

## 2020-06-28 RX ADMIN — Medication 100 MILLIGRAM(S): at 11:43

## 2020-06-28 RX ADMIN — LIDOCAINE 1 PATCH: 4 CREAM TOPICAL at 19:30

## 2020-06-28 RX ADMIN — SIMVASTATIN 5 MILLIGRAM(S): 20 TABLET, FILM COATED ORAL at 21:16

## 2020-06-28 RX ADMIN — Medication 3 MILLILITER(S): at 05:06

## 2020-06-28 RX ADMIN — PANTOPRAZOLE SODIUM 40 MILLIGRAM(S): 20 TABLET, DELAYED RELEASE ORAL at 05:18

## 2020-06-28 RX ADMIN — LIDOCAINE 1 PATCH: 4 CREAM TOPICAL at 23:59

## 2020-06-28 RX ADMIN — Medication 30 MILLIGRAM(S): at 05:17

## 2020-06-28 RX ADMIN — LIDOCAINE 1 PATCH: 4 CREAM TOPICAL at 11:43

## 2020-06-28 RX ADMIN — Medication 40 MILLIGRAM(S): at 05:18

## 2020-06-28 RX ADMIN — Medication 25 MILLIGRAM(S): at 05:18

## 2020-06-28 RX ADMIN — Medication 3 MILLILITER(S): at 11:10

## 2020-06-28 RX ADMIN — Medication 3 MILLILITER(S): at 17:40

## 2020-06-28 NOTE — PROGRESS NOTE ADULT - ASSESSMENT
IMPROVE VTE Individual Risk Assessment    RISK                                                                Points    [  ] Previous VTE                                                  3    [  ] Thrombophilia                                               2    [  ] Lower limb paralysis                                      2        (unable to hold up >15 seconds)      [  ] Current Cancer                                              2         (within 6 months)    [  ] Immobilization > 24 hrs                                1    [  ] ICU/CCU stay > 24 hours                              1    [  ] Age > 60                                                      1  2  IMPROVE VTE Score _________    IMPROVE Score 0-1: Low Risk, No VTE prophylaxis required for most patients, encourage ambulation.   IMPROVE Score 2-3: At risk, pharmacologic VTE prophylaxis is indicated for most patients (in the absence of a contraindication)  IMPROVE Score > or = 4: High Risk, pharmacologic VTE prophylaxis is indicated for most patients (in the absence of a contraindication)  On Coumadin    70 y/o CHF, COPD on home O2 3L, HTN, Gout, and pulmonary HTN, afib, presents with sob x 2 weeks, worse in past week. denies fever, body aches, cough, cp. denies black/bloody BM. Stool Occult blood negative.  06/21/2020 : Pt. alert. Breathing improved. Hb 6.2, Has Antibodies, having difficulty with type and X-match. Continue present.  06/22/2020 : Alert. Breathing better. Transfuse PRBC for Hb. 7.0. Pulmonary consult noted.  06/23/2020 : C/O SOB. S/P transfusion. Hb still 7.7. Add Solu medrol.  Minimal rhonchi bilateral. Continue Lasix. Follow CBC. Pulmonary F/U.  06/24/2020 : Tachycardia and dyspnea on exertion. Denies any pain. Occult blood stool requested. Pulmonary F/U noted.  06/25/2020 : Alert. asymptomatic.  Steroid and diuretic reduced. Hb.8.5. stool occult blood negative. Continue Present.  06/26/2020 : Alert. No SOB. C/O mild abdominal pain. Continue Steroid and Lasix. Pulmonary F/U noted.  06/27/2020 : Alert. No SOB. Hb stable @8.3. Change to prednisone. Discharge planning.  06/27/2020 : Alert, awake. VILLA on mild exertion. On oral steroids. S/W to see the patient for DME.

## 2020-06-28 NOTE — PROGRESS NOTE ADULT - SUBJECTIVE AND OBJECTIVE BOX
Patient is a 69y old  Female who presents with a chief complaint of VILLA (27 Jun 2020 11:17)      INTERVAL HPI/OVERNIGHT EVENTS:    MEDICATIONS  (STANDING):  albuterol/ipratropium for Nebulization 3 milliLiter(s) Nebulizer every 6 hours  allopurinol 100 milliGRAM(s) Oral daily  digoxin     Tablet 0.125 milliGRAM(s) Oral daily  furosemide    Tablet 40 milliGRAM(s) Oral daily  lidocaine   Patch 1 Patch Transdermal daily  lisinopril 20 milliGRAM(s) Oral daily  metoprolol succinate ER 25 milliGRAM(s) Oral daily  pantoprazole    Tablet 40 milliGRAM(s) Oral before breakfast  predniSONE   Tablet 30 milliGRAM(s) Oral daily  simvastatin 5 milliGRAM(s) Oral at bedtime    MEDICATIONS  (PRN):      Allergies    No Known Allergies    Intolerances        REVIEW OF SYSTEMS:  CONSTITUTIONAL: No fever, weight loss, or fatigue  EYES: No eye pain, visual disturbances, or discharge  ENMT:  No difficulty hearing, tinnitus, vertigo; No sinus or throat pain  NECK: No pain or stiffness  BREASTS: No pain, masses, or nipple discharge  RESPIRATORY: No cough, wheezing, chills or hemoptysis; No shortness of breath  CARDIOVASCULAR: No chest pain, palpitations, dizziness, or leg swelling  GASTROINTESTINAL: No abdominal or epigastric pain. No nausea, vomiting, or hematemesis; No diarrhea or constipation. No melena or hematochezia.  GENITOURINARY: No dysuria, frequency, hematuria, or incontinence  NEUROLOGICAL: No headaches, memory loss, loss of strength, numbness, or tremors  SKIN: No itching, burning, rashes, or lesions   LYMPH NODES: No enlarged glands  ENDOCRINE: No heat or cold intolerance; No hair loss  MUSCULOSKELETAL: No joint pain or swelling; No muscle, back, or extremity pain  PSYCHIATRIC: No depression, anxiety, mood swings, or difficulty sleeping  HEME/LYMPH: No easy bruising, or bleeding gums  ALLERGY AND IMMUNOLOGIC: No hives or eczema    Vital Signs Last 24 Hrs  T(C): 36.8 (28 Jun 2020 05:12), Max: 36.9 (27 Jun 2020 11:56)  T(F): 98.2 (28 Jun 2020 05:12), Max: 98.5 (27 Jun 2020 11:56)  HR: 85 (28 Jun 2020 06:15) (77 - 96)  BP: 118/53 (28 Jun 2020 05:12) (118/53 - 129/61)  BP(mean): --  RR: 18 (28 Jun 2020 05:12) (16 - 18)  SpO2: 98% (28 Jun 2020 06:15) (95% - 100%)    PHYSICAL EXAM:  GENERAL: NAD, Morbid Obesity  HEAD:  Atraumatic, Normocephalic  EYES: EOMI, PERRL, conjunctiva and sclera clear  ENMT:  Moist mucous membranes, Good dentition, No lesions  NECK: Supple, No JVD, Normal thyroid  NERVOUS SYSTEM:  Alert & Oriented X 3, Good concentration; Motor Strength 5/5 B/L upper and lower extremities; DTRs 2+ intact and symmetric  CHEST/LUNG: Clear to percussion bilaterally; No rales, rhonchi, wheezing, or rubs  HEART: Regular rate and rhythm; No murmurs, rubs, or gallops  ABDOMEN: Soft, Nontender, Nondistended; Bowel sounds present  EXTREMITIES:  2+ Peripheral Pulses, No clubbing, cyanosis, or edema  LYMPH: No lymphadenopathy noted  SKIN: No rashes or lesions    LABS:                        8.3    10.79 )-----------( 471      ( 26 Jun 2020 09:44 )             30.4           PT/INR - ( 28 Jun 2020 07:50 )   PT: 32.6 sec;   INR: 2.84 ratio             CAPILLARY BLOOD GLUCOSE                        RADIOLOGY & ADDITIONAL TESTS:    Imaging Personally Reviewed:  [ ] YES  [ ] NO    Consultant(s) Notes Reviewed:  [ ] YES  [ ] NO    Care Discussed with Consultants/Other Providers [ ] YES  [ ] NO    PROBLEMS:  COPD EXACERBATION;  CONGESTIVE HEART FAILURE;  ANEMIA  SHORTNESS OF BREATH  COPD exacerbation  Longstanding persistent atrial fibrillation  Essential hypertension  Gout  Other iron deficiency anemia  COPD exacerbation  Acute on chronic systolic congestive heart failure      Care discussed with family,         [  ]   yes  [  ]  No    imp:    stable[ ]    unstable[  ]     improving [   ]       unchanged  [  ]                Plans:  Continue present plans  [  ]               New consult [  ]   specialty  .......               order maribell[  ]    test name.                  Discharge Planning  [  ]

## 2020-06-29 ENCOUNTER — TRANSCRIPTION ENCOUNTER (OUTPATIENT)
Age: 70
End: 2020-06-29

## 2020-06-29 LAB
INR BLD: 2.36 RATIO — HIGH (ref 0.88–1.16)
PROTHROM AB SERPL-ACNC: 27.2 SEC — HIGH (ref 10.6–13.6)

## 2020-06-29 RX ORDER — WARFARIN SODIUM 2.5 MG/1
4 TABLET ORAL ONCE
Refills: 0 | Status: COMPLETED | OUTPATIENT
Start: 2020-06-29 | End: 2020-06-29

## 2020-06-29 RX ADMIN — LIDOCAINE 1 PATCH: 4 CREAM TOPICAL at 19:00

## 2020-06-29 RX ADMIN — Medication 3 MILLILITER(S): at 17:48

## 2020-06-29 RX ADMIN — LISINOPRIL 20 MILLIGRAM(S): 2.5 TABLET ORAL at 06:00

## 2020-06-29 RX ADMIN — WARFARIN SODIUM 4 MILLIGRAM(S): 2.5 TABLET ORAL at 22:42

## 2020-06-29 RX ADMIN — LIDOCAINE 1 PATCH: 4 CREAM TOPICAL at 13:37

## 2020-06-29 RX ADMIN — Medication 40 MILLIGRAM(S): at 06:00

## 2020-06-29 RX ADMIN — PANTOPRAZOLE SODIUM 40 MILLIGRAM(S): 20 TABLET, DELAYED RELEASE ORAL at 06:00

## 2020-06-29 RX ADMIN — Medication 100 MILLIGRAM(S): at 11:30

## 2020-06-29 RX ADMIN — Medication 3 MILLILITER(S): at 06:08

## 2020-06-29 RX ADMIN — Medication 30 MILLIGRAM(S): at 06:00

## 2020-06-29 RX ADMIN — SIMVASTATIN 5 MILLIGRAM(S): 20 TABLET, FILM COATED ORAL at 23:02

## 2020-06-29 RX ADMIN — Medication 25 MILLIGRAM(S): at 06:00

## 2020-06-29 RX ADMIN — Medication 3 MILLILITER(S): at 11:50

## 2020-06-29 RX ADMIN — Medication 3 MILLILITER(S): at 00:10

## 2020-06-29 RX ADMIN — Medication 0.12 MILLIGRAM(S): at 06:04

## 2020-06-29 NOTE — DISCHARGE NOTE PROVIDER - NSDCMRMEDTOKEN_GEN_ALL_CORE_FT
allopurinol 100 mg oral tablet: 1 tab(s) orally 2 times a day (after meals)  Coumadin 5 mg oral tablet: 1 tab(s) orally once a day  digoxin 125 mcg (0.125 mg) oral tablet: 1 tab(s) orally once a day  furosemide 40 mg oral tablet: 1 tab(s) orally once a day  Klor-Con M20 oral tablet, extended release: orally once a day  Metoprolol Succinate ER 25 mg oral tablet, extended release: 1 tab(s) orally once a day  pantoprazole 40 mg oral delayed release tablet: 1 tab(s) orally 2 times a day  predniSONE 10 mg oral tablet: 3 tab(s) orally once a day x 7 days  2 tab(s) orally once a day x 7 days  1 tab(s) orally once a day x 7 days  ramipril 5 mg oral capsule: 1 cap(s) orally once a day  simvastatin 5 mg oral tablet: orally once a day

## 2020-06-29 NOTE — PROGRESS NOTE ADULT - SUBJECTIVE AND OBJECTIVE BOX
Patient is a 69y old  Female who presents with a chief complaint of VILLA (28 Jun 2020 09:14)      INTERVAL HPI/OVERNIGHT EVENTS:    MEDICATIONS  (STANDING):  albuterol/ipratropium for Nebulization 3 milliLiter(s) Nebulizer every 6 hours  allopurinol 100 milliGRAM(s) Oral daily  digoxin     Tablet 0.125 milliGRAM(s) Oral daily  furosemide    Tablet 40 milliGRAM(s) Oral daily  lidocaine   Patch 1 Patch Transdermal daily  lisinopril 20 milliGRAM(s) Oral daily  metoprolol succinate ER 25 milliGRAM(s) Oral daily  pantoprazole    Tablet 40 milliGRAM(s) Oral before breakfast  predniSONE   Tablet 30 milliGRAM(s) Oral daily  simvastatin 5 milliGRAM(s) Oral at bedtime    MEDICATIONS  (PRN):      Allergies    No Known Allergies    Intolerances        REVIEW OF SYSTEMS:  CONSTITUTIONAL: No fever, weight loss, or fatigue  EYES: No eye pain, visual disturbances, or discharge  ENMT:  No difficulty hearing, tinnitus, vertigo; No sinus or throat pain  NECK: No pain or stiffness  BREASTS: No pain, masses, or nipple discharge  RESPIRATORY: No cough, wheezing, chills or hemoptysis; No shortness of breath  CARDIOVASCULAR: No chest pain, palpitations, dizziness, or leg swelling  GASTROINTESTINAL: No abdominal or epigastric pain. No nausea, vomiting, or hematemesis; No diarrhea or constipation. No melena or hematochezia.  GENITOURINARY: No dysuria, frequency, hematuria, or incontinence  NEUROLOGICAL: No headaches, memory loss, loss of strength, numbness, or tremors  SKIN: No itching, burning, rashes, or lesions   LYMPH NODES: No enlarged glands  ENDOCRINE: No heat or cold intolerance; No hair loss  MUSCULOSKELETAL: No joint pain or swelling; No muscle, back, or extremity pain  PSYCHIATRIC: No depression, anxiety, mood swings, or difficulty sleeping  HEME/LYMPH: No easy bruising, or bleeding gums  ALLERGY AND IMMUNOLOGIC: No hives or eczema    Vital Signs Last 24 Hrs  T(C): 36.7 (29 Jun 2020 04:56), Max: 36.8 (28 Jun 2020 16:26)  T(F): 98.1 (29 Jun 2020 04:56), Max: 98.2 (28 Jun 2020 16:26)  HR: 84 (29 Jun 2020 06:08) (76 - 92)  BP: 117/58 (29 Jun 2020 04:56) (117/58 - 146/68)  BP(mean): --  RR: 18 (29 Jun 2020 04:56) (16 - 18)  SpO2: 100% (29 Jun 2020 06:08) (94% - 100%)    PHYSICAL EXAM:  GENERAL: NAD, well-groomed, well-developed  HEAD:  Atraumatic, Normocephalic  EYES: EOMI, PERRL, conjunctiva and sclera clear  ENMT:  Moist mucous membranes, Good dentition, No lesions  NECK: Supple, No JVD, Normal thyroid  NERVOUS SYSTEM:  Alert & Oriented X3, Good concentration; Motor Strength 5/5 B/L upper and lower extremities; DTRs 2+ intact and symmetric  CHEST/LUNG: Clear to percussion bilaterally; No rales, rhonchi, wheezing, or rubs  HEART: Regular rate and rhythm; No murmurs, rubs, or gallops  ABDOMEN: Soft, Nontender, Nondistended; Bowel sounds present  EXTREMITIES:  2+ Peripheral Pulses, No clubbing, cyanosis, or edema  LYMPH: No lymphadenopathy noted  SKIN: No rashes or lesions    LABS:          PT/INR - ( 28 Jun 2020 07:50 )   PT: 32.6 sec;   INR: 2.84 ratio             CAPILLARY BLOOD GLUCOSE        RADIOLOGY & ADDITIONAL TESTS:  < from: TTE Echo Complete w/ Contrast w/ Doppler (06.21.20 @ 09:56) >    Summary:   1. Left ventricular ejection fraction, by visual estimation, is 60 to 65%.   2. Technically suboptimal study.   3. Normal global left ventricular systolic function.   4. Normal left ventricular internal cavity size.   5. Spectral Doppler shows pseudonormal pattern of left ventricular myocardial filling (Grade II diastolic dysfunction).   6. There is mild concentric left ventricular hypertrophy.   7. Normal right ventricular size and function.   8. There is no evidence of pericardial effusion.   9. Mild mitral annular calcification.  10.Mild mitral valve regurgitation.  11. Mitral prosthesis with elevated gradients noted.  12. Estimated pulmonary artery systolic pressure is 37.2 mmHg assuming a right atrial pressure of 5 mmHg, which is consistent with borderline pulmonary hypertension.  13. Endocardial visualization was enhanced with intravenous echo contrast.  14. Elevated left atrial and left ventricular end-diastolic pressures.    Montana Barreto MD FACC, FASE, FACP  Electronically signed on 6/21/2020 at 10:30:57 PM    < end of copied text >    Imaging Personally Reviewed:  [ ] YES  [ ] NO    Consultant(s) Notes Reviewed:  [ ] YES  [ ] NO    Care Discussed with Consultants/Other Providers [ ] YES  [ ] NO    PROBLEMS:  COPD EXACERBATION;  CONGESTIVE HEART FAILURE;  ANEMIA  SHORTNESS OF BREATH  COPD exacerbation  Longstanding persistent atrial fibrillation  Essential hypertension  Gout  Other iron deficiency anemia  COPD exacerbation  Acute on chronic systolic congestive heart failure      Care discussed with family,         [  ]   yes  [  ]  No    imp:    stable[ ]    unstable[  ]     improving [   ]       unchanged  [  ]                Plans:  Continue present plans  [  ]               New consult [  ]   specialty  .......               order maribell[  ]    test name.                  Discharge Planning  [  ]

## 2020-06-29 NOTE — DISCHARGE NOTE PROVIDER - HOSPITAL COURSE
Updated report called to Dr. Christopher and verbally given to KASSI Caraballo. HPI:    · HPI: 70 y/o CHF, COPD on home O2 3L, HTN, Gout, and pulmonary HTN, afib, presents with sob x 2 weeks, worse in past week. denies fever, body aches, cough, cp. denies black/bloody BM.    No fever/chills, No photophobia/eye pain/changes in vision, No ear pain/sore throat/dysphagia, No chest pain/palpitations, +SOB, no cough, no wheeze/stridor, No abdominal pain, No N/V/D, no dysuria/frequency/discharge, No neck/back pain, no rash, no changes in neurological status/function. (20 Jun 2020 17:22)    In hospital pt seen by pulm Dr. Elizabeth, for dischargeto home with home care, prednisone taper and f/u with PMD and pulm outpt. HPI:    · HPI: 68 y/o CHF, COPD on home O2 3L, HTN, Gout, and pulmonary HTN, afib, presents with sob x 2 weeks, worse in past week. denies fever, body aches, cough, cp. denies black/bloody BM.    No fever/chills, No photophobia/eye pain/changes in vision, No ear pain/sore throat/dysphagia, No chest pain/palpitations, +SOB, no cough, no wheeze/stridor, No abdominal pain, No N/V/D, no dysuria/frequency/discharge, No neck/back pain, no rash, no changes in neurological status/function. (20 Jun 2020 17:22)    In hospital pt seen by pulm Dr. Elizabeth, for dischargeto home with home care, prednisone taper and f/u with PMD and pulm outpt.     06/21/2020 : Pt. alert. Breathing improved. Hb 6.2, Has Antibodies, having difficulty with type and X-match. Continue present.    06/22/2020 : Alert. Breathing better. Transfuse PRBC for Hb. 7.0. Pulmonary consult noted.    06/23/2020 : C/O SOB. S/P transfusion. Hb still 7.7. Add Solu medrol.  Minimal rhonchi bilateral. Continue Lasix. Follow CBC. Pulmonary F/U.    06/24/2020 : Tachycardia and dyspnea on exertion. Denies any pain. Occult blood stool requested. Pulmonary F/U noted.    06/25/2020 : Alert. asymptomatic.  Steroid and diuretic reduced. Hb.8.5. stool occult blood negative. Continue Present.    06/26/2020 : Alert. No SOB. C/O mild abdominal pain. Continue Steroid and Lasix. Pulmonary F/U noted.    06/27/2020 : Alert. No SOB. Hb stable @8.3. Change to prednisone. Discharge planning.    06/28/2020 : Alert, awake. VILLA on mild exertion. On oral steroids. S/W to see the patient for DME.    06/28/2020 : Alert. Awake. VILLA. Clear lungs. Discharge planning. Pt. at base line.    F/U with PMD and Pulmonary.

## 2020-06-29 NOTE — DISCHARGE NOTE PROVIDER - CARE PROVIDER_API CALL
Gerald Manning  CARDIOVASCULAR DISEASE  [address] [place] [address]  Phone: [phone]  Fax: [phone]  Follow Up Time:     Storm Elizabeth  MEDICINE  [address] [place] [address]  Phone: [phone]  Fax: [phone]  Follow Up Time:     Dakota Mccrary  INTERNAL MEDICINE  [address] [place] [address]  Phone: [phone]  Fax: [phone]  Follow Up Time:

## 2020-06-29 NOTE — PROGRESS NOTE ADULT - ASSESSMENT
IMPROVE VTE Individual Risk Assessment    RISK                                                                Points    [  ] Previous VTE                                                  3    [  ] Thrombophilia                                               2    [  ] Lower limb paralysis                                      2        (unable to hold up >15 seconds)      [  ] Current Cancer                                              2         (within 6 months)    [  ] Immobilization > 24 hrs                                1    [  ] ICU/CCU stay > 24 hours                              1    [  ] Age > 60                                                      1  2  IMPROVE VTE Score _________    IMPROVE Score 0-1: Low Risk, No VTE prophylaxis required for most patients, encourage ambulation.   IMPROVE Score 2-3: At risk, pharmacologic VTE prophylaxis is indicated for most patients (in the absence of a contraindication)  IMPROVE Score > or = 4: High Risk, pharmacologic VTE prophylaxis is indicated for most patients (in the absence of a contraindication)  On Coumadin    68 y/o CHF, COPD on home O2 3L, HTN, Gout, and pulmonary HTN, afib, presents with sob x 2 weeks, worse in past week. denies fever, body aches, cough, cp. denies black/bloody BM. Stool Occult blood negative.  06/21/2020 : Pt. alert. Breathing improved. Hb 6.2, Has Antibodies, having difficulty with type and X-match. Continue present.  06/22/2020 : Alert. Breathing better. Transfuse PRBC for Hb. 7.0. Pulmonary consult noted.  06/23/2020 : C/O SOB. S/P transfusion. Hb still 7.7. Add Solu medrol.  Minimal rhonchi bilateral. Continue Lasix. Follow CBC. Pulmonary F/U.  06/24/2020 : Tachycardia and dyspnea on exertion. Denies any pain. Occult blood stool requested. Pulmonary F/U noted.  06/25/2020 : Alert. asymptomatic.  Steroid and diuretic reduced. Hb.8.5. stool occult blood negative. Continue Present.  06/26/2020 : Alert. No SOB. C/O mild abdominal pain. Continue Steroid and Lasix. Pulmonary F/U noted.  06/27/2020 : Alert. No SOB. Hb stable @8.3. Change to prednisone. Discharge planning.  06/28/2020 : Alert, awake. VILLA on mild exertion. On oral steroids. S/W to see the patient for DME.  06/28/2020 : Alert. Awake. VILLA. Clear lungs. Discharge planning. Pt. at base line.

## 2020-06-29 NOTE — DISCHARGE NOTE PROVIDER - PROVIDER TOKENS
PROVIDER:[TOKEN:[[id number]]],PROVIDER:[TOKEN:[[id number]]],PROVIDER:[TOKEN:[[id number]]]

## 2020-06-29 NOTE — PROGRESS NOTE ADULT - SUBJECTIVE AND OBJECTIVE BOX
INTERVAL HPI:  69 year female with HTN, A fib, Mitral & Tricuspid valve replacement, pHTN, Diastolic CHF, COPD(never smoked but reports 2nd hand exposure from ), Chronic hypoxic hypercarbic Respiratory failure  on home O2, Obesity, MIGUEL(did not tolerate CPAP), pHTN, Gout, Anemia, Renal Insuffiencey  Presented  with sob x 2 weeks, which got  worst  in past week. SOB more with exertion and in supine position, better at rest.  Denies fever, body aches, Significant new cough or sputum production, cp, black/bloody BM.    OVERNIGHT EVENTS:  Comfortable in chair    Vital Signs Last 24 Hrs  T(C): 37.1 (29 Jun 2020 11:49), Max: 37.1 (29 Jun 2020 11:49)  T(F): 98.7 (29 Jun 2020 11:49), Max: 98.7 (29 Jun 2020 11:49)  HR: 81 (29 Jun 2020 11:50) (79 - 92)  BP: 134/70 (29 Jun 2020 11:49) (117/58 - 146/68)  BP(mean): --  RR: 18 (29 Jun 2020 11:49) (16 - 18)  SpO2: 99% (29 Jun 2020 11:50) (94% - 100%)    PHYSICAL EXAM:  GEN:         Awake, responsive and comfortable.  HEENT:    Normal.    RESP:        no wheezing  CVS:             Regular rate and rhythm.   ABD:         Soft, non-tender, non-distended;     MEDICATIONS  (STANDING):  albuterol/ipratropium for Nebulization 3 milliLiter(s) Nebulizer every 6 hours  allopurinol 100 milliGRAM(s) Oral daily  digoxin     Tablet 0.125 milliGRAM(s) Oral daily  furosemide    Tablet 40 milliGRAM(s) Oral daily  lidocaine   Patch 1 Patch Transdermal daily  lisinopril 20 milliGRAM(s) Oral daily  metoprolol succinate ER 25 milliGRAM(s) Oral daily  pantoprazole    Tablet 40 milliGRAM(s) Oral before breakfast  predniSONE   Tablet 30 milliGRAM(s) Oral daily  simvastatin 5 milliGRAM(s) Oral at bedtime  warfarin 4 milliGRAM(s) Oral once    PT/INR - ( 29 Jun 2020 12:36 )   PT: 27.2 sec;   INR: 2.36 ratio       ASSESSMENT AND PLAN:  ·	Acute on chronic hypoxic hypercarbic Respiratory failure.  ·	Acute on chronic diastolic CHF.  ·	Acute COPD exacerbation.  ·	Obesity.  ·	MIGUEL, did not tolerate CPAP.  ·	Atrial fibrillation.  ·	pHTN.  ·	S/P Mitral and Tricuspid Valve replacement.  ·	Suspect GI bleed.  ·	Renal Insuffiencey.    Taper steroids over 5-7 days.  Continue O2, nebulizer and diuretics.

## 2020-06-29 NOTE — DISCHARGE NOTE PROVIDER - NSDCCPCAREPLAN_GEN_ALL_CORE_FT
PRINCIPAL DISCHARGE DIAGNOSIS  Diagnosis: COPD exacerbation  Assessment and Plan of Treatment:       SECONDARY DISCHARGE DIAGNOSES  Diagnosis: Anemia  Assessment and Plan of Treatment:     Diagnosis: Essential hypertension  Assessment and Plan of Treatment: Essential hypertension    Diagnosis: Other iron deficiency anemia  Assessment and Plan of Treatment: Other iron deficiency anemia    Diagnosis: Gout  Assessment and Plan of Treatment: Gout    Diagnosis: Longstanding persistent atrial fibrillation  Assessment and Plan of Treatment: Longstanding persistent atrial fibrillation    Diagnosis: CHF (congestive heart failure)  Assessment and Plan of Treatment:

## 2020-06-30 VITALS — OXYGEN SATURATION: 98 %

## 2020-06-30 LAB
HCT VFR BLD CALC: 28 % — LOW (ref 34.5–45)
HGB BLD-MCNC: 7.6 G/DL — LOW (ref 11.5–15.5)
INR BLD: 2.13 RATIO — HIGH (ref 0.88–1.16)
MCHC RBC-ENTMCNC: 22.3 PG — LOW (ref 27–34)
MCHC RBC-ENTMCNC: 27.1 GM/DL — LOW (ref 32–36)
MCV RBC AUTO: 82.1 FL — SIGNIFICANT CHANGE UP (ref 80–100)
NRBC # BLD: 0 /100 WBCS — SIGNIFICANT CHANGE UP (ref 0–0)
PLATELET # BLD AUTO: 399 K/UL — SIGNIFICANT CHANGE UP (ref 150–400)
PROTHROM AB SERPL-ACNC: 24.2 SEC — HIGH (ref 10.6–13.6)
RBC # BLD: 3.41 M/UL — LOW (ref 3.8–5.2)
RBC # FLD: 22 % — HIGH (ref 10.3–14.5)
WBC # BLD: 11.38 K/UL — HIGH (ref 3.8–10.5)
WBC # FLD AUTO: 11.38 K/UL — HIGH (ref 3.8–10.5)

## 2020-06-30 RX ADMIN — Medication 0.12 MILLIGRAM(S): at 05:17

## 2020-06-30 RX ADMIN — LIDOCAINE 1 PATCH: 4 CREAM TOPICAL at 01:00

## 2020-06-30 RX ADMIN — Medication 40 MILLIGRAM(S): at 05:17

## 2020-06-30 RX ADMIN — PANTOPRAZOLE SODIUM 40 MILLIGRAM(S): 20 TABLET, DELAYED RELEASE ORAL at 05:21

## 2020-06-30 RX ADMIN — Medication 3 MILLILITER(S): at 00:12

## 2020-06-30 RX ADMIN — Medication 25 MILLIGRAM(S): at 05:18

## 2020-06-30 RX ADMIN — Medication 100 MILLIGRAM(S): at 11:23

## 2020-06-30 RX ADMIN — Medication 3 MILLILITER(S): at 05:21

## 2020-06-30 RX ADMIN — Medication 3 MILLILITER(S): at 11:27

## 2020-06-30 RX ADMIN — LISINOPRIL 20 MILLIGRAM(S): 2.5 TABLET ORAL at 05:17

## 2020-06-30 RX ADMIN — LIDOCAINE 1 PATCH: 4 CREAM TOPICAL at 11:18

## 2020-06-30 RX ADMIN — Medication 30 MILLIGRAM(S): at 05:17

## 2020-07-02 DIAGNOSIS — D50.9 IRON DEFICIENCY ANEMIA, UNSPECIFIED: ICD-10-CM

## 2020-07-02 DIAGNOSIS — E66.01 MORBID (SEVERE) OBESITY DUE TO EXCESS CALORIES: ICD-10-CM

## 2020-07-02 DIAGNOSIS — I50.23 ACUTE ON CHRONIC SYSTOLIC (CONGESTIVE) HEART FAILURE: ICD-10-CM

## 2020-07-02 DIAGNOSIS — I48.11 LONGSTANDING PERSISTENT ATRIAL FIBRILLATION: ICD-10-CM

## 2020-07-02 DIAGNOSIS — R04.0 EPISTAXIS: ICD-10-CM

## 2020-07-02 DIAGNOSIS — Z99.81 DEPENDENCE ON SUPPLEMENTAL OXYGEN: ICD-10-CM

## 2020-07-02 DIAGNOSIS — Z79.01 LONG TERM (CURRENT) USE OF ANTICOAGULANTS: ICD-10-CM

## 2020-07-02 DIAGNOSIS — Z11.59 ENCOUNTER FOR SCREENING FOR OTHER VIRAL DISEASES: ICD-10-CM

## 2020-07-02 DIAGNOSIS — Z95.2 PRESENCE OF PROSTHETIC HEART VALVE: ICD-10-CM

## 2020-07-02 DIAGNOSIS — Z77.22 CONTACT WITH AND (SUSPECTED) EXPOSURE TO ENVIRONMENTAL TOBACCO SMOKE (ACUTE) (CHRONIC): ICD-10-CM

## 2020-07-02 DIAGNOSIS — M10.9 GOUT, UNSPECIFIED: ICD-10-CM

## 2020-07-02 DIAGNOSIS — G47.33 OBSTRUCTIVE SLEEP APNEA (ADULT) (PEDIATRIC): ICD-10-CM

## 2020-07-02 DIAGNOSIS — I27.20 PULMONARY HYPERTENSION, UNSPECIFIED: ICD-10-CM

## 2020-07-02 DIAGNOSIS — I11.0 HYPERTENSIVE HEART DISEASE WITH HEART FAILURE: ICD-10-CM

## 2020-07-02 DIAGNOSIS — R06.02 SHORTNESS OF BREATH: ICD-10-CM

## 2020-07-02 DIAGNOSIS — J96.21 ACUTE AND CHRONIC RESPIRATORY FAILURE WITH HYPOXIA: ICD-10-CM

## 2020-07-02 DIAGNOSIS — J44.1 CHRONIC OBSTRUCTIVE PULMONARY DISEASE WITH (ACUTE) EXACERBATION: ICD-10-CM

## 2020-07-02 DIAGNOSIS — J96.22 ACUTE AND CHRONIC RESPIRATORY FAILURE WITH HYPERCAPNIA: ICD-10-CM

## 2020-08-26 NOTE — PROGRESS NOTE ADULT - PROBLEM SELECTOR PROBLEM 5
9.13 (Voluntary) 9.13 (Voluntary) Essential hypertension 9.13 (Voluntary) 9.13 (Voluntary) 9.13 (Voluntary) 9.13 (Voluntary) 9.13 (Voluntary) 9.13 (Voluntary) 9.13 (Voluntary) 9.13 (Voluntary) 9.13 (Voluntary) 9.13 (Voluntary) 9.13 (Voluntary)

## 2020-09-15 ENCOUNTER — INPATIENT (INPATIENT)
Facility: HOSPITAL | Age: 70
LOS: 2 days | Discharge: ANOTHER HOSPITAL | End: 2020-09-18
Attending: GENERAL ACUTE CARE HOSPITAL | Admitting: GENERAL ACUTE CARE HOSPITAL
Payer: MEDICARE

## 2020-09-15 VITALS
HEART RATE: 90 BPM | WEIGHT: 240.08 LBS | HEIGHT: 64 IN | TEMPERATURE: 98 F | DIASTOLIC BLOOD PRESSURE: 98 MMHG | SYSTOLIC BLOOD PRESSURE: 188 MMHG | OXYGEN SATURATION: 100 % | RESPIRATION RATE: 20 BRPM

## 2020-09-15 DIAGNOSIS — Z95.4 PRESENCE OF OTHER HEART-VALVE REPLACEMENT: Chronic | ICD-10-CM

## 2020-09-15 LAB
ALBUMIN SERPL ELPH-MCNC: 3.3 G/DL — SIGNIFICANT CHANGE UP (ref 3.3–5)
ALP SERPL-CCNC: 142 U/L — HIGH (ref 40–120)
ALT FLD-CCNC: 22 U/L — SIGNIFICANT CHANGE UP (ref 12–78)
ANION GAP SERPL CALC-SCNC: 1 MMOL/L — LOW (ref 5–17)
ANISOCYTOSIS BLD QL: SIGNIFICANT CHANGE UP
APTT BLD: 45.7 SEC — HIGH (ref 27.5–35.5)
AST SERPL-CCNC: 26 U/L — SIGNIFICANT CHANGE UP (ref 15–37)
BASE EXCESS BLDA CALC-SCNC: 4.1 MMOL/L — HIGH (ref -2–2)
BASOPHILS # BLD AUTO: 0 K/UL — SIGNIFICANT CHANGE UP (ref 0–0.2)
BASOPHILS NFR BLD AUTO: 0 % — SIGNIFICANT CHANGE UP (ref 0–2)
BILIRUB SERPL-MCNC: 0.3 MG/DL — SIGNIFICANT CHANGE UP (ref 0.2–1.2)
BLOOD GAS COMMENTS: SIGNIFICANT CHANGE UP
BLOOD GAS COMMENTS: SIGNIFICANT CHANGE UP
BLOOD GAS SOURCE: SIGNIFICANT CHANGE UP
BUN SERPL-MCNC: 39 MG/DL — HIGH (ref 7–23)
CALCIUM SERPL-MCNC: 9.3 MG/DL — SIGNIFICANT CHANGE UP (ref 8.5–10.1)
CHLORIDE SERPL-SCNC: 99 MMOL/L — SIGNIFICANT CHANGE UP (ref 96–108)
CK MB BLD-MCNC: <2.9 % — SIGNIFICANT CHANGE UP (ref 0–3.5)
CK MB CFR SERPL CALC: <1 NG/ML — SIGNIFICANT CHANGE UP (ref 0.5–3.6)
CK SERPL-CCNC: 35 U/L — SIGNIFICANT CHANGE UP (ref 26–192)
CO2 SERPL-SCNC: 37 MMOL/L — HIGH (ref 22–31)
CREAT SERPL-MCNC: 1.27 MG/DL — SIGNIFICANT CHANGE UP (ref 0.5–1.3)
DACRYOCYTES BLD QL SMEAR: SLIGHT — SIGNIFICANT CHANGE UP
ELLIPTOCYTES BLD QL SMEAR: SLIGHT — SIGNIFICANT CHANGE UP
EOSINOPHIL # BLD AUTO: 0.07 K/UL — SIGNIFICANT CHANGE UP (ref 0–0.5)
EOSINOPHIL NFR BLD AUTO: 1 % — SIGNIFICANT CHANGE UP (ref 0–6)
GLUCOSE SERPL-MCNC: 113 MG/DL — HIGH (ref 70–99)
HCO3 BLDA-SCNC: 31 MMOL/L — HIGH (ref 21–29)
HCT VFR BLD CALC: 29.8 % — LOW (ref 34.5–45)
HGB BLD-MCNC: 7.9 G/DL — LOW (ref 11.5–15.5)
HOROWITZ INDEX BLDA+IHG-RTO: 28 — SIGNIFICANT CHANGE UP
HYPOCHROMIA BLD QL: SIGNIFICANT CHANGE UP
INR BLD: 2.25 RATIO — HIGH (ref 0.88–1.16)
LG PLATELETS BLD QL AUTO: SLIGHT — SIGNIFICANT CHANGE UP
LYMPHOCYTES # BLD AUTO: 1.19 K/UL — SIGNIFICANT CHANGE UP (ref 1–3.3)
LYMPHOCYTES # BLD AUTO: 18 % — SIGNIFICANT CHANGE UP (ref 13–44)
MACROCYTES BLD QL: SLIGHT — SIGNIFICANT CHANGE UP
MANUAL SMEAR VERIFICATION: YES — SIGNIFICANT CHANGE UP
MCHC RBC-ENTMCNC: 24.4 PG — LOW (ref 27–34)
MCHC RBC-ENTMCNC: 26.5 GM/DL — LOW (ref 32–36)
MCV RBC AUTO: 92 FL — SIGNIFICANT CHANGE UP (ref 80–100)
MONOCYTES # BLD AUTO: 0.53 K/UL — SIGNIFICANT CHANGE UP (ref 0–0.9)
MONOCYTES NFR BLD AUTO: 8 % — SIGNIFICANT CHANGE UP (ref 2–14)
NEUTROPHILS # BLD AUTO: 4.81 K/UL — SIGNIFICANT CHANGE UP (ref 1.8–7.4)
NEUTROPHILS NFR BLD AUTO: 72 % — SIGNIFICANT CHANGE UP (ref 43–77)
NEUTS BAND # BLD: 1 % — SIGNIFICANT CHANGE UP (ref 0–8)
NRBC # BLD: 0 /100 — SIGNIFICANT CHANGE UP (ref 0–0)
NRBC # BLD: SIGNIFICANT CHANGE UP /100 WBCS (ref 0–0)
NT-PROBNP SERPL-SCNC: 1341 PG/ML — HIGH (ref 0–125)
PCO2 BLDA: 65 MMHG — HIGH (ref 32–46)
PH BLD: 7.3 — LOW (ref 7.35–7.45)
PLAT MORPH BLD: NORMAL — SIGNIFICANT CHANGE UP
PLATELET # BLD AUTO: 304 K/UL — SIGNIFICANT CHANGE UP (ref 150–400)
PO2 BLDA: 87 MMHG — SIGNIFICANT CHANGE UP (ref 74–108)
POTASSIUM SERPL-MCNC: 5.2 MMOL/L — SIGNIFICANT CHANGE UP (ref 3.5–5.3)
POTASSIUM SERPL-SCNC: 5.2 MMOL/L — SIGNIFICANT CHANGE UP (ref 3.5–5.3)
PROT SERPL-MCNC: 8.7 GM/DL — HIGH (ref 6–8.3)
PROTHROM AB SERPL-ACNC: 25.1 SEC — HIGH (ref 10.6–13.6)
RBC # BLD: 3.24 M/UL — LOW (ref 3.8–5.2)
RBC # FLD: 18.7 % — HIGH (ref 10.3–14.5)
RBC BLD AUTO: ABNORMAL
SAO2 % BLDA: 96 % — SIGNIFICANT CHANGE UP (ref 92–96)
SARS-COV-2 RNA SPEC QL NAA+PROBE: SIGNIFICANT CHANGE UP
SCHISTOCYTES BLD QL AUTO: SLIGHT — SIGNIFICANT CHANGE UP
SMUDGE CELLS # BLD: PRESENT — SIGNIFICANT CHANGE UP
SODIUM SERPL-SCNC: 137 MMOL/L — SIGNIFICANT CHANGE UP (ref 135–145)
TARGETS BLD QL SMEAR: SLIGHT — SIGNIFICANT CHANGE UP
TROPONIN I SERPL-MCNC: <.015 NG/ML — SIGNIFICANT CHANGE UP (ref 0.01–0.04)
WBC # BLD: 6.59 K/UL — SIGNIFICANT CHANGE UP (ref 3.8–10.5)
WBC # FLD AUTO: 6.59 K/UL — SIGNIFICANT CHANGE UP (ref 3.8–10.5)

## 2020-09-15 PROCEDURE — 93010 ELECTROCARDIOGRAM REPORT: CPT

## 2020-09-15 PROCEDURE — 99285 EMERGENCY DEPT VISIT HI MDM: CPT

## 2020-09-15 PROCEDURE — 99222 1ST HOSP IP/OBS MODERATE 55: CPT

## 2020-09-15 PROCEDURE — 71045 X-RAY EXAM CHEST 1 VIEW: CPT | Mod: 26

## 2020-09-15 RX ORDER — ALBUTEROL 90 UG/1
2 AEROSOL, METERED ORAL ONCE
Refills: 0 | Status: COMPLETED | OUTPATIENT
Start: 2020-09-15 | End: 2020-09-15

## 2020-09-15 RX ORDER — FUROSEMIDE 40 MG
40 TABLET ORAL ONCE
Refills: 0 | Status: COMPLETED | OUTPATIENT
Start: 2020-09-15 | End: 2020-09-15

## 2020-09-15 RX ADMIN — Medication 40 MILLIGRAM(S): at 22:23

## 2020-09-15 RX ADMIN — ALBUTEROL 2 PUFF(S): 90 AEROSOL, METERED ORAL at 20:29

## 2020-09-15 NOTE — ED ADULT NURSE REASSESSMENT NOTE - NS ED NURSE REASSESS COMMENT FT1
Pt on Bi-PAP settings IPAP 16/EPAP5, FIO2 30%, saturating 98%, tolerating well  Pt reports improvement in breathing status, resp less labored, RR at 23 BPM, saturating 97%  administered Lasix as ordered   remains on bedside cardiac monitor with continuous pulse oximeter in place

## 2020-09-15 NOTE — ED PROVIDER NOTE - OBJECTIVE STATEMENT
70 year old female with h/o HTN, CHF and COPD presents today c/o 4 day h/o midsternal chest tightness associated with SOB which worsened today, pt reports VILLA, (-) nausea or vomiting (-) dizzy or lightheaded (-) palpitations (-) leg edma or calf pains

## 2020-09-15 NOTE — ED PROVIDER NOTE - CLINICAL SUMMARY MEDICAL DECISION MAKING FREE TEXT BOX
pt presents today feeling sob and midsternal chest pressure, chest xray shows fluid overload pt presents today feeling sob and midsternal chest pressure, chest xray shows fluid overload, pt placed on bipap for symptomatic relief, c/o sob in ER, pt's cardiologist dr jung called

## 2020-09-15 NOTE — ED ADULT NURSE NOTE - OBJECTIVE STATEMENT
Pt has a history of COPD. Reports SOB and chest tightness  that started 4 days ago and worsened today.

## 2020-09-15 NOTE — ED ADULT NURSE NOTE - NSIMPLEMENTINTERV_GEN_ALL_ED
Implemented All Fall Risk Interventions:  [place] to call system. Call bell, personal items and telephone within reach. Instruct patient to call for assistance. Room bathroom lighting operational. Non-slip footwear when patient is off stretcher. Physically safe environment: no spills, clutter or unnecessary equipment. Stretcher in lowest position, wheels locked, appropriate side rails in place. Provide visual cue, wrist band, yellow gown, etc. Monitor gait and stability. Monitor for mental status changes and reorient to person, place, and time. Review medications for side effects contributing to fall risk. Reinforce activity limits and safety measures with patient and family.

## 2020-09-15 NOTE — ED ADULT NURSE REASSESSMENT NOTE - NS ED NURSE REASSESS COMMENT FT1
received Pt from FREDDY Bynum  Pt AA&OX3, Pt has severe dyspnea upon minimal exertion, is tachypneic with rapid labored respirations, SPO2 97% at 3LPM received Pt from FREDDY Bynum  Pt AA&OX3, Pt has severe dyspnea upon minimal exertion, is tachypneic with RR in high 20s, has rapid labored respirations, unable to speak in full sentences w/o pausing for breath  assisted pt to [place]'s position to assisted with breathing, placed on bedside cardiac monitor with continuous pulse oximeter in place, on supplemental oxygen via NC at 2lpm, SPO2 at 97%   made aware of patient's resp status, Pt to get Albuterol inhaler received Pt from FREDDY Bynum  Pt AA&OX3, Pt has severe dyspnea upon minimal exertion, is tachypneic with RR in high 20s, has rapid labored respirations, unable to speak in full sentences w/o pausing for breath  assisted pt to [place]'s position to assisted with breathing, placed on bedside cardiac monitor with continuous pulse oximeter in place, SPO2 80% on RA, on supplemental oxygen via NC at 3lpm, SPO2 at 97%   made aware of patient's resp status, Pt to get Albuterol inhaler

## 2020-09-15 NOTE — ED PROVIDER NOTE - PROGRESS NOTE DETAILS
dr jung called, states pt recently had mitral valve and tricupsid valve repair, also has aortic stenosis, pt usually presents sob, pt to be admitted for diuresis and bipap

## 2020-09-15 NOTE — ED PROVIDER NOTE - CONSTITUTIONAL, MLM
normal... Obese female, awake, alert, oriented to person, place, time/situation, pt appears SOB but able to speak in complete sentences

## 2020-09-16 DIAGNOSIS — Z95.2 PRESENCE OF PROSTHETIC HEART VALVE: ICD-10-CM

## 2020-09-16 DIAGNOSIS — E78.5 HYPERLIPIDEMIA, UNSPECIFIED: ICD-10-CM

## 2020-09-16 DIAGNOSIS — M1A.9XX0 CHRONIC GOUT, UNSPECIFIED, WITHOUT TOPHUS (TOPHI): ICD-10-CM

## 2020-09-16 DIAGNOSIS — I50.23 ACUTE ON CHRONIC SYSTOLIC (CONGESTIVE) HEART FAILURE: ICD-10-CM

## 2020-09-16 DIAGNOSIS — I10 ESSENTIAL (PRIMARY) HYPERTENSION: ICD-10-CM

## 2020-09-16 DIAGNOSIS — J44.9 CHRONIC OBSTRUCTIVE PULMONARY DISEASE, UNSPECIFIED: ICD-10-CM

## 2020-09-16 LAB
ANION GAP SERPL CALC-SCNC: 3 MMOL/L — LOW (ref 5–17)
BASE EXCESS BLDA CALC-SCNC: 5.4 MMOL/L — HIGH (ref -2–2)
BLOOD GAS COMMENTS: SIGNIFICANT CHANGE UP
BLOOD GAS COMMENTS: SIGNIFICANT CHANGE UP
BLOOD GAS SOURCE: SIGNIFICANT CHANGE UP
BUN SERPL-MCNC: 39 MG/DL — HIGH (ref 7–23)
CALCIUM SERPL-MCNC: 9.8 MG/DL — SIGNIFICANT CHANGE UP (ref 8.5–10.1)
CHLORIDE SERPL-SCNC: 99 MMOL/L — SIGNIFICANT CHANGE UP (ref 96–108)
CO2 SERPL-SCNC: 37 MMOL/L — HIGH (ref 22–31)
CREAT SERPL-MCNC: 1.3 MG/DL — SIGNIFICANT CHANGE UP (ref 0.5–1.3)
GLUCOSE SERPL-MCNC: 106 MG/DL — HIGH (ref 70–99)
HCO3 BLDA-SCNC: 31 MMOL/L — HIGH (ref 21–29)
HCT VFR BLD CALC: 29.1 % — LOW (ref 34.5–45)
HCV AB S/CO SERPL IA: 0.52 S/CO — SIGNIFICANT CHANGE UP (ref 0–0.99)
HCV AB SERPL-IMP: SIGNIFICANT CHANGE UP
HGB BLD-MCNC: 7.9 G/DL — LOW (ref 11.5–15.5)
HOROWITZ INDEX BLDA+IHG-RTO: 30 — SIGNIFICANT CHANGE UP
INR BLD: 2.45 RATIO — HIGH (ref 0.88–1.16)
MCHC RBC-ENTMCNC: 24.6 PG — LOW (ref 27–34)
MCHC RBC-ENTMCNC: 27.1 GM/DL — LOW (ref 32–36)
MCV RBC AUTO: 90.7 FL — SIGNIFICANT CHANGE UP (ref 80–100)
NRBC # BLD: 0 /100 WBCS — SIGNIFICANT CHANGE UP (ref 0–0)
PCO2 BLDA: 56 MMHG — HIGH (ref 32–46)
PH BLD: 7.36 — SIGNIFICANT CHANGE UP (ref 7.35–7.45)
PLATELET # BLD AUTO: 311 K/UL — SIGNIFICANT CHANGE UP (ref 150–400)
PO2 BLDA: 99 MMHG — SIGNIFICANT CHANGE UP (ref 74–108)
POTASSIUM SERPL-MCNC: 4.4 MMOL/L — SIGNIFICANT CHANGE UP (ref 3.5–5.3)
POTASSIUM SERPL-SCNC: 4.4 MMOL/L — SIGNIFICANT CHANGE UP (ref 3.5–5.3)
PROTHROM AB SERPL-ACNC: 27.2 SEC — HIGH (ref 10.6–13.6)
RBC # BLD: 3.21 M/UL — LOW (ref 3.8–5.2)
RBC # FLD: 18.9 % — HIGH (ref 10.3–14.5)
SAO2 % BLDA: 98 % — HIGH (ref 92–96)
SARS-COV-2 IGG SERPL QL IA: NEGATIVE — SIGNIFICANT CHANGE UP
SARS-COV-2 IGM SERPL IA-ACNC: <0.1 INDEX — SIGNIFICANT CHANGE UP
SODIUM SERPL-SCNC: 139 MMOL/L — SIGNIFICANT CHANGE UP (ref 135–145)
WBC # BLD: 6.78 K/UL — SIGNIFICANT CHANGE UP (ref 3.8–10.5)
WBC # FLD AUTO: 6.78 K/UL — SIGNIFICANT CHANGE UP (ref 3.8–10.5)

## 2020-09-16 RX ORDER — LISINOPRIL 2.5 MG/1
5 TABLET ORAL DAILY
Refills: 0 | Status: DISCONTINUED | OUTPATIENT
Start: 2020-09-16 | End: 2020-09-16

## 2020-09-16 RX ORDER — FUROSEMIDE 40 MG
40 TABLET ORAL
Refills: 0 | Status: DISCONTINUED | OUTPATIENT
Start: 2020-09-16 | End: 2020-09-18

## 2020-09-16 RX ORDER — WARFARIN SODIUM 2.5 MG/1
5 TABLET ORAL ONCE
Refills: 0 | Status: COMPLETED | OUTPATIENT
Start: 2020-09-16 | End: 2020-09-16

## 2020-09-16 RX ORDER — METOPROLOL TARTRATE 50 MG
25 TABLET ORAL
Refills: 0 | Status: DISCONTINUED | OUTPATIENT
Start: 2020-09-16 | End: 2020-09-16

## 2020-09-16 RX ORDER — METOPROLOL TARTRATE 50 MG
25 TABLET ORAL
Refills: 0 | Status: DISCONTINUED | OUTPATIENT
Start: 2020-09-16 | End: 2020-09-18

## 2020-09-16 RX ORDER — IPRATROPIUM/ALBUTEROL SULFATE 18-103MCG
3 AEROSOL WITH ADAPTER (GRAM) INHALATION EVERY 6 HOURS
Refills: 0 | Status: DISCONTINUED | OUTPATIENT
Start: 2020-09-16 | End: 2020-09-17

## 2020-09-16 RX ORDER — SIMVASTATIN 20 MG/1
40 TABLET, FILM COATED ORAL AT BEDTIME
Refills: 0 | Status: DISCONTINUED | OUTPATIENT
Start: 2020-09-16 | End: 2020-09-18

## 2020-09-16 RX ORDER — LISINOPRIL 2.5 MG/1
5 TABLET ORAL DAILY
Refills: 0 | Status: DISCONTINUED | OUTPATIENT
Start: 2020-09-17 | End: 2020-09-17

## 2020-09-16 RX ORDER — TIOTROPIUM BROMIDE 18 UG/1
1 CAPSULE ORAL; RESPIRATORY (INHALATION) DAILY
Refills: 0 | Status: DISCONTINUED | OUTPATIENT
Start: 2020-09-16 | End: 2020-09-17

## 2020-09-16 RX ORDER — INFLUENZA VIRUS VACCINE 15; 15; 15; 15 UG/.5ML; UG/.5ML; UG/.5ML; UG/.5ML
0.5 SUSPENSION INTRAMUSCULAR ONCE
Refills: 0 | Status: COMPLETED | OUTPATIENT
Start: 2020-09-16 | End: 2020-09-16

## 2020-09-16 RX ORDER — ACETAMINOPHEN 500 MG
650 TABLET ORAL EVERY 6 HOURS
Refills: 0 | Status: DISCONTINUED | OUTPATIENT
Start: 2020-09-16 | End: 2020-09-18

## 2020-09-16 RX ORDER — ALLOPURINOL 300 MG
100 TABLET ORAL DAILY
Refills: 0 | Status: DISCONTINUED | OUTPATIENT
Start: 2020-09-16 | End: 2020-09-18

## 2020-09-16 RX ORDER — BUDESONIDE AND FORMOTEROL FUMARATE DIHYDRATE 160; 4.5 UG/1; UG/1
2 AEROSOL RESPIRATORY (INHALATION)
Refills: 0 | Status: DISCONTINUED | OUTPATIENT
Start: 2020-09-16 | End: 2020-09-18

## 2020-09-16 RX ADMIN — Medication 40 MILLIGRAM(S): at 17:12

## 2020-09-16 RX ADMIN — WARFARIN SODIUM 5 MILLIGRAM(S): 2.5 TABLET ORAL at 21:32

## 2020-09-16 RX ADMIN — Medication 25 MILLIGRAM(S): at 06:07

## 2020-09-16 RX ADMIN — LISINOPRIL 5 MILLIGRAM(S): 2.5 TABLET ORAL at 06:10

## 2020-09-16 RX ADMIN — Medication 100 MILLIGRAM(S): at 11:44

## 2020-09-16 RX ADMIN — Medication 40 MILLIGRAM(S): at 06:07

## 2020-09-16 RX ADMIN — Medication 650 MILLIGRAM(S): at 13:40

## 2020-09-16 RX ADMIN — Medication 650 MILLIGRAM(S): at 14:40

## 2020-09-16 RX ADMIN — Medication 25 MILLIGRAM(S): at 17:13

## 2020-09-16 RX ADMIN — SIMVASTATIN 40 MILLIGRAM(S): 20 TABLET, FILM COATED ORAL at 21:33

## 2020-09-16 NOTE — PHYSICAL THERAPY INITIAL EVALUATION ADULT - BALANCE TRAINING, PT EVAL
Patient will improve static and dynamic standing balance with rolling walker to fair or greater balance in 4 weeks to improve safety and decrease risk of falls.

## 2020-09-16 NOTE — H&P ADULT - NSHPREVIEWOFSYSTEMS_GEN_ALL_CORE
Constitutional: no fever, chills, night sweats  Ears: no hearing changes or ear pain,   Nose: no nasal congestion, sinus pain, or rhinorrhea  Cardio: no chest pain, orthopnea, edema, or palpitations  Resp: dyspnea positive.  No cough, wheezing  GI: no nausea, vomiting, diarrhea, constipation, hematochezia, or melena  : no dysuria, urinary frequency, hematuria  MSK: no back pain, neck pain  Skin: no rash, pruritis   Neuro: no weakness, dizziness, lightheadedness, syncope   Heme/Lymph: no bruising or bleeding

## 2020-09-16 NOTE — H&P ADULT - ASSESSMENT
Patient is a 70F with a PMH of HTN, CHF, COPD? mitral valve prolapse s/p repair on warfarin, gout who presents to the ED for acute dyspnea.  History limited as patient is currently on BiPAP.  Patient reports worsening dyspnea for the last 4 days.  States that at baseline she suffers with orthopnea and sleeps with 4 pillows however no she cannot lean back at all without dyspnea.  Denies chest pain, palpitations but reports chest tightness that makes it difficult for her to breathe.  Placed on BiPAP in ED which improved her dyspnea.  Hypertensive in triage.  Labs show respiratory acidosis.  CXR shows interstitial opacities consistent with CHF exacerbation.  ED reached out to patient cardiologist, Dr Manning, who will come to see the patient.  Will admit to tele.

## 2020-09-16 NOTE — CHART NOTE - TREATMENT: THE FOLLOWING DIET HAS BEEN RECOMMENDED
Diet, DASH/TLC:   Sodium & Cholesterol Restricted (09-16-20 @ 14:13) [Pending Verification By Attending]  Diet, Consistent Carbohydrate w/Evening Snack (09-16-20 @ 01:52) [Active]

## 2020-09-16 NOTE — H&P ADULT - NSHPLABSRESULTS_GEN_ALL_CORE
Recent Vitals  T(C): 36.9 (09-15-20 @ 18:34), Max: 36.9 (09-15-20 @ 18:34)  HR: 57 (09-16-20 @ 00:28) (54 - 90)  BP: 156/76 (09-15-20 @ 23:48) (150/71 - 188/98)  RR: 15 (09-15-20 @ 23:48) (15 - 28)  SpO2: 99% (09-16-20 @ 00:28) (97% - 100%)                        7.9    6.59  )-----------( 304      ( 15 Sep 2020 20:03 )             29.8     09-15    137  |  99  |  39<H>  ----------------------------<  113<H>  5.2   |  37<H>  |  1.27    Ca    9.3      15 Sep 2020 20:03    TPro  8.7<H>  /  Alb  3.3  /  TBili  0.3  /  DBili  x   /  AST  26  /  ALT  22  /  AlkPhos  142<H>  09-15    PT/INR - ( 15 Sep 2020 20:03 )   PT: 25.1 sec;   INR: 2.25 ratio         PTT - ( 15 Sep 2020 20:03 )  PTT:45.7 sec  LIVER FUNCTIONS - ( 15 Sep 2020 20:03 )  Alb: 3.3 g/dL / Pro: 8.7 gm/dL / ALK PHOS: 142 U/L / ALT: 22 U/L / AST: 26 U/L / GGT: x               Home Medications:

## 2020-09-16 NOTE — DIETITIAN INITIAL EVALUATION ADULT. - PERTINENT MEDS FT
MEDICATIONS  (STANDING):  allopurinol 100 milliGRAM(s) Oral daily  budesonide 160 MICROgram(s)/formoterol 4.5 MICROgram(s) Inhaler 2 Puff(s) Inhalation two times a day  furosemide   Injectable 40 milliGRAM(s) IV Push two times a day  metoprolol tartrate 25 milliGRAM(s) Oral two times a day  simvastatin 40 milliGRAM(s) Oral at bedtime  tiotropium 18 MICROgram(s) Capsule 1 Capsule(s) Inhalation daily  warfarin 5 milliGRAM(s) Oral once    MEDICATIONS  (PRN):  acetaminophen   Tablet .. 650 milliGRAM(s) Oral every 6 hours PRN Temp greater or equal to 38C (100.4F), Mild Pain (1 - 3)  albuterol/ipratropium for Nebulization 3 milliLiter(s) Nebulizer every 6 hours PRN Shortness of Breath and/or Wheezing

## 2020-09-16 NOTE — DIETITIAN INITIAL EVALUATION ADULT. - OTHER INFO
Pt adm w/dyspnea. Met w/pt at bedside, pt reports improved appetite since yesterday, per recall pt consumed ~75% breakfast meal this morning. Pt denies N/V/D/C or difficulty chewing/swallowing (despite full upper dentures). Pt reports PTA she was trying to follow healthy eating by cutting down on excessive salt in her food, and also was eating less amount. Pt states she was living w/family and had help w/food-shopping and cooking. Pt reports her UBW fluctuates between 245-250# and denies any recent significant changes. Pt was very interested in knowing more about healthy diet and asked pertinent questions. Diet education on Low Sodium, Heart Failure Nutrition therapy, and healthy eating w/portion control for gradual weight loss provided and compliance encouraged. Answered all questions to pt's satisfaction. Encouraged pt to maintain good PO intake. Pt made aware RDN remains available.

## 2020-09-16 NOTE — H&P ADULT - NSICDXPASTMEDICALHX_GEN_ALL_CORE_FT
PAST MEDICAL HISTORY:  CHF (congestive heart failure)     COPD (chronic obstructive pulmonary disease)     Hypertension

## 2020-09-16 NOTE — H&P ADULT - NSHPPHYSICALEXAM_GEN_ALL_CORE
Physical exam:  General: patient in no acute distress, resting comfortably  Head:  Atraumatic, Normocephalic  Eyes: EOMI, PERRLA, clear sclera  Neck: Supple, thyroid nontender, non enlarged  Cardio: S1/S2 +ve, regular rate and rhythm, no M/G/R  Resp: mild bilateral rales  GI: abdomen soft, nontender, non distended, no guarding, BS +ve x 4  Ext: no significant pedal edema  Neuro: CN 2-12 intact, no significant motor or sensory deficits.  Skin: No rashes or lesions

## 2020-09-16 NOTE — PHYSICAL THERAPY INITIAL EVALUATION ADULT - GAIT TRAINING, PT EVAL
Pt will ambulate 150ft independently c Rolling Walker and improved balance in 2 weeks. Stair assessment to be assessed

## 2020-09-16 NOTE — DIETITIAN INITIAL EVALUATION ADULT. - ENERGY NEEDS
Height (cm): 162.6 (09-16)  Weight (kg): 113.3 (09-16)  BMI (kg/m2): 42.9 (09-16)  IBW:  55 kg   % IBW: 206%     UBW: 245-250#     %UBW: 100%

## 2020-09-16 NOTE — PHYSICAL THERAPY INITIAL EVALUATION ADULT - ADDITIONAL COMMENTS
As per pt, pt lives c  and 2 daughters in a private house c 5 steps to enter the home c bilateral rails (wide and far apart). Pt reports bedroom and bathroom located on the main floor of the home. Pt independent with all ADLs (pt states daughter aides with "washing my back") and ambulates with the use of Rolling Walker. Pt owns Rolling Walker, rollator, shower chair and cane. Pt denies falls.

## 2020-09-16 NOTE — PHYSICAL THERAPY INITIAL EVALUATION ADULT - TRANSFER TRAINING, PT EVAL
Pt will perform sit to stand and bed to chair transfers independently c Rolling Walker by 2-4 weeks.

## 2020-09-16 NOTE — PHYSICAL THERAPY INITIAL EVALUATION ADULT - GENERAL OBSERVATIONS, REHAB EVAL
Pt encountered seated at EOB, A&Ox4, denies pain and discomfort at this time, +supplemental O2 via nasal cannula, +IV lock, NAD and comfortable.

## 2020-09-17 LAB
ANION GAP SERPL CALC-SCNC: 2 MMOL/L — LOW (ref 5–17)
BLD GP AB SCN SERPL QL: SIGNIFICANT CHANGE UP
BUN SERPL-MCNC: 48 MG/DL — HIGH (ref 7–23)
CALCIUM SERPL-MCNC: 9.3 MG/DL — SIGNIFICANT CHANGE UP (ref 8.5–10.1)
CHLORIDE SERPL-SCNC: 97 MMOL/L — SIGNIFICANT CHANGE UP (ref 96–108)
CO2 SERPL-SCNC: 38 MMOL/L — HIGH (ref 22–31)
CREAT SERPL-MCNC: 1.39 MG/DL — HIGH (ref 0.5–1.3)
GLUCOSE SERPL-MCNC: 93 MG/DL — SIGNIFICANT CHANGE UP (ref 70–99)
HCT VFR BLD CALC: 27.3 % — LOW (ref 34.5–45)
HGB BLD-MCNC: 7.6 G/DL — LOW (ref 11.5–15.5)
INR BLD: 2.32 RATIO — HIGH (ref 0.88–1.16)
MAGNESIUM SERPL-MCNC: 2.3 MG/DL — SIGNIFICANT CHANGE UP (ref 1.6–2.6)
MCHC RBC-ENTMCNC: 24.5 PG — LOW (ref 27–34)
MCHC RBC-ENTMCNC: 27.8 GM/DL — LOW (ref 32–36)
MCV RBC AUTO: 88.1 FL — SIGNIFICANT CHANGE UP (ref 80–100)
NRBC # BLD: 0 /100 WBCS — SIGNIFICANT CHANGE UP (ref 0–0)
PLATELET # BLD AUTO: 292 K/UL — SIGNIFICANT CHANGE UP (ref 150–400)
POTASSIUM SERPL-MCNC: 4.1 MMOL/L — SIGNIFICANT CHANGE UP (ref 3.5–5.3)
POTASSIUM SERPL-SCNC: 4.1 MMOL/L — SIGNIFICANT CHANGE UP (ref 3.5–5.3)
PROTHROM AB SERPL-ACNC: 25.9 SEC — HIGH (ref 10.6–13.6)
RBC # BLD: 3.1 M/UL — LOW (ref 3.8–5.2)
RBC # FLD: 18.8 % — HIGH (ref 10.3–14.5)
SODIUM SERPL-SCNC: 137 MMOL/L — SIGNIFICANT CHANGE UP (ref 135–145)
WBC # BLD: 6.3 K/UL — SIGNIFICANT CHANGE UP (ref 3.8–10.5)
WBC # FLD AUTO: 6.3 K/UL — SIGNIFICANT CHANGE UP (ref 3.8–10.5)

## 2020-09-17 PROCEDURE — 99233 SBSQ HOSP IP/OBS HIGH 50: CPT

## 2020-09-17 PROCEDURE — 99222 1ST HOSP IP/OBS MODERATE 55: CPT

## 2020-09-17 PROCEDURE — 93010 ELECTROCARDIOGRAM REPORT: CPT

## 2020-09-17 RX ORDER — IPRATROPIUM/ALBUTEROL SULFATE 18-103MCG
3 AEROSOL WITH ADAPTER (GRAM) INHALATION EVERY 6 HOURS
Refills: 0 | Status: DISCONTINUED | OUTPATIENT
Start: 2020-09-17 | End: 2020-09-18

## 2020-09-17 RX ORDER — FUROSEMIDE 40 MG
40 TABLET ORAL ONCE
Refills: 0 | Status: COMPLETED | OUTPATIENT
Start: 2020-09-17 | End: 2020-09-18

## 2020-09-17 RX ORDER — PANTOPRAZOLE SODIUM 20 MG/1
40 TABLET, DELAYED RELEASE ORAL
Refills: 0 | Status: DISCONTINUED | OUTPATIENT
Start: 2020-09-17 | End: 2020-09-18

## 2020-09-17 RX ORDER — WARFARIN SODIUM 2.5 MG/1
5 TABLET ORAL ONCE
Refills: 0 | Status: COMPLETED | OUTPATIENT
Start: 2020-09-17 | End: 2020-09-17

## 2020-09-17 RX ADMIN — Medication 40 MILLIGRAM(S): at 10:26

## 2020-09-17 RX ADMIN — LISINOPRIL 5 MILLIGRAM(S): 2.5 TABLET ORAL at 05:30

## 2020-09-17 RX ADMIN — Medication 25 MILLIGRAM(S): at 18:28

## 2020-09-17 RX ADMIN — Medication 3 MILLILITER(S): at 23:50

## 2020-09-17 RX ADMIN — SIMVASTATIN 40 MILLIGRAM(S): 20 TABLET, FILM COATED ORAL at 21:16

## 2020-09-17 RX ADMIN — Medication 100 MILLIGRAM(S): at 12:27

## 2020-09-17 RX ADMIN — Medication 40 MILLIGRAM(S): at 18:28

## 2020-09-17 RX ADMIN — WARFARIN SODIUM 5 MILLIGRAM(S): 2.5 TABLET ORAL at 21:16

## 2020-09-17 NOTE — PROGRESS NOTE ADULT - NUTRITIONAL ASSESSMENT
This patient has been assessed with a concern for Malnutrition and has been determined to have a diagnosis/diagnoses of Morbid obesity/BMI > 40.    This patient is being managed with:   Diet DASH/TLC-  Sodium & Cholesterol Restricted  Entered: Sep 16 2020  2:13PM

## 2020-09-17 NOTE — PROGRESS NOTE ADULT - ASSESSMENT
70F with a PMH of HTN, CHF, COPD? mitral valve prolapse s/p repair on warfarin, gout who presents to the ED for acute dyspnea. 70F with a PMH of HTN, CHF, COPD?, long valvular cardiac hx... as per Dr. Manning she is s/p MVR for MS with subsequent progression of MS, TVR 2012 now with severe TR and severe pulm HTN, hx of GIB but unable to scope 2/2 valvular heart disease, gout; presents to the ED with dyspnea.       Assessment and Plan:   1. Acute on chronic systolic and diastolic CHF exacerbation   valvular disease , s/p MVR for MS with subsequent progression of MS, TVR 2012 now with severe TR and severe pulm HTN  CXR shows interstitial opacities consistent with CHF exacerbation.  ECG:  sinus 57bpm; RBBB  Carlos neg x 1  ECG:  sinus 57bpm; RBBB  cardiology consult appreciated, plan to transfer patient to [facility] for CTS and HF evaluations as limited therapeutic options in [facility] VS , patient is agreeable. family at bedside aware of plan.   continue with diuresis   BIPAP prn   continue with home O2 3L NC to maintain SpO2 >92%   follow repeat ECHO  repeat CXR in AM (if patient still here)   on coumadin for valvular disease --INR therapeutic, give coumadin 5mg at bedtime today and monitor INR  monitor, daily weights, I/Os   monitor electrolytes and replete as necessary   HOBE     2. Acute respiratory failure secondary to above   continue with BIPAP prn and at bedtime     3. Morbid obesity   BMI 42.9  nutrition consult appreciated     4. COPD on 3L NC home O2   continue with supplemental O2   duonebs Q6H   symbicort 2/2    5. Chronic blood loss anemia, unclear source   GIB history ?? although unable to scope in the past due to heart disease patient denies brbpr, melena, hemoptysis or hematemesis  will give 1u PRBC today with lasix 40mg IVP x 1 after transfusion to prevent acute pulm edema    in setting of heart disease, goal Hgb >8   obtain FOBT   will monitor H/H closely   will continue coumadin at this time as risks outweight benefits off AC  maintain active T&S   added PPI    6. gout, not in acute attack   continue with allopurinol     7. Preventative measures,   coumadin -dvt ppx  fall precautions      70F with a PMH of HTN, CHF, COPD?, long valvular cardiac hx... as per Dr. Manning she is s/p MVR for MS with subsequent progression of MS, TVR 2012 now with severe TR and severe pulm HTN, hx of GIB but unable to scope 2/2 valvular heart disease, gout; presents to the ED with dyspnea.       Assessment and Plan:   1. Acute on chronic systolic and diastolic CHF exacerbation   valvular disease , s/p MVR for MS with subsequent progression of MS, TVR 2012 now with severe TR and severe pulm HTN  CXR shows interstitial opacities consistent with CHF exacerbation.  ECG:  sinus 57bpm; RBBB  Carlos neg x 1  ECG:  sinus 57bpm; RBBB  cardiology consult appreciated, plan to transfer patient to [facility] for CTS and HF evaluations as limited therapeutic options in [facility] VS , patient is agreeable. family at bedside aware of plan.   continue with diuresis   BIPAP prn   continue with home O2 3L NC to maintain SpO2 >92%   follow repeat ECHO  repeat CXR in AM (if patient still here)   on coumadin for valvular disease --INR therapeutic, give coumadin 5mg at bedtime today and monitor INR  monitor, daily weights, I/Os   monitor electrolytes and replete as necessary   HOBE     2. Acute respiratory failure with hypercapnia secondary to above   continue with BIPAP prn and at bedtime     3. Morbid obesity   BMI 42.9  nutrition consult appreciated     4. COPD on 3L NC home O2   continue with supplemental O2   duonebs Q6H   symbicort 2/2    5. Chronic blood loss anemia, unclear source   GIB history ?? although unable to scope in the past due to heart disease patient denies brbpr, melena, hemoptysis or hematemesis  will give 1u PRBC today with lasix 40mg IVP x 1 after transfusion to prevent acute pulm edema    in setting of heart disease, goal Hgb >8   obtain FOBT   will monitor H/H closely   will continue coumadin at this time as risks outweight benefits off AC  maintain active T&S   added PPI    6. gout, not in acute attack   continue with allopurinol     7. Preventative measures,   coumadin -dvt ppx  fall precautions

## 2020-09-17 NOTE — PROGRESS NOTE ADULT - SUBJECTIVE AND OBJECTIVE BOX
HPI:  Patient is a 70F with a PMH of HTN, CHF, COPD? mitral valve prolapse s/p repair on warfarin, gout who presents to the ED for acute dyspnea.  History limited as patient is currently on BiPAP.  Patient reports worsening dyspnea for the last 4 days.  States that at baseline she suffers with orthopnea and sleeps with 4 pillows however no she cannot lean back at all without dyspnea.  Denies chest pain, palpitations but reports chest tightness that makes it difficult for her to breathe.  Placed on BiPAP in ED which improved her dyspnea.  Hypertensive in triage.  Labs show respiratory acidosis.  CXR shows interstitial opacities consistent with CHF exacerbation.  ED reached out to patient cardiologist, Dr Manning, who will come to see the patient.  Will admit to tele.     (16 Sep 2020 00:42)      SUBJECTIVE & OBJECTIVE: Pt seen and examined at bedside.   no overnight events     PHYSICAL EXAM:  T(C): 37.1 (09-17-20 @ 11:28), Max: 37.1 (09-17-20 @ 11:28)  HR: 75 (09-17-20 @ 11:28) (50 - 75)  BP: 126/63 (09-17-20 @ 11:28) (110/47 - 153/71)  RR: 18 (09-17-20 @ 11:28) (17 - 19)  SpO2: 98% (09-17-20 @ 11:28) (94% - 99%)  Wt(kg): --   I&O's Detail    16 Sep 2020 07:01  -  17 Sep 2020 07:00  --------------------------------------------------------  IN:    Oral Fluid: 240 mL  Total IN: 240 mL    OUT:    Voided (mL): 200 mL  Total OUT: 200 mL    Total NET: 40 mL        GENERAL: NAD, well-groomed, well-developed  HEAD:  Atraumatic, Normocephalic  EYES: EOMI, PERRLA, conjunctiva and sclera clear  ENMT: Moist mucous membranes  NECK: Supple, No JVD  NERVOUS SYSTEM:  Alert & Oriented X3, Motor Strength 5/5 B/L upper and lower extremities; DTRs 2+ intact and symmetric  CHEST/LUNG: Clear to auscultation bilaterally; No rales, rhonchi, wheezing, or rubs  HEART: Regular rate and rhythm; No murmurs, rubs, or gallops  ABDOMEN: Soft, Nontender, Nondistended; Bowel sounds present  EXTREMITIES:  2+ Peripheral Pulses, No clubbing, cyanosis, or edema    MEDICATIONS  (STANDING):  allopurinol 100 milliGRAM(s) Oral daily  budesonide 160 MICROgram(s)/formoterol 4.5 MICROgram(s) Inhaler 2 Puff(s) Inhalation two times a day  furosemide   Injectable 40 milliGRAM(s) IV Push two times a day  metoprolol tartrate 25 milliGRAM(s) Oral two times a day  simvastatin 40 milliGRAM(s) Oral at bedtime  tiotropium 18 MICROgram(s) Capsule 1 Capsule(s) Inhalation daily  warfarin 5 milliGRAM(s) Oral once    MEDICATIONS  (PRN):  acetaminophen   Tablet .. 650 milliGRAM(s) Oral every 6 hours PRN Temp greater or equal to 38C (100.4F), Mild Pain (1 - 3)  albuterol/ipratropium for Nebulization 3 milliLiter(s) Nebulizer every 6 hours PRN Shortness of Breath and/or Wheezing      LABS:                        7.6    6.30  )-----------( 292      ( 17 Sep 2020 06:47 )             27.3     09-17    137  |  97  |  48<H>  ----------------------------<  93  4.1   |  38<H>  |  1.39<H>    Ca    9.3      17 Sep 2020 06:47  Mg     2.3     09-17    TPro  8.7<H>  /  Alb  3.3  /  TBili  0.3  /  DBili  x   /  AST  26  /  ALT  22  /  AlkPhos  142<H>  09-15    PT/INR - ( 17 Sep 2020 06:47 )   PT: 25.9 sec;   INR: 2.32 ratio         PTT - ( 15 Sep 2020 20:03 )  PTT:45.7 sec    Magnesium, Serum: 2.3 mg/dL (09-17 @ 10:20)    CAPILLARY BLOOD GLUCOSE        ABG - ( 16 Sep 2020 00:24 )  pH, Arterial: x     pH, Blood: 7.36  /  pCO2: 56    /  pO2: 99    / HCO3: 31    / Base Excess: 5.4   /  SaO2: 98                CARDIAC MARKERS ( 15 Sep 2020 20:03 )  <.015 ng/mL / x     / 35 U/L / x     / <1.0 ng/mL        RECENT CULTURES:      RADIOLOGY & ADDITIONAL TESTS:  Imaging Personally Reviewed:  [ x] YES  [ ] NO    Consultant(s) Notes Reviewed:  [ x] YES  [ ] NO    Care Discussed with Consultants/Other Providers [ x] YES  [ ] NO  Care discussed in detail with patient.  All questions and concerns addressed   HPI:  Patient is a 70F with a PMH of HTN, CHF, COPD? mitral valve prolapse s/p repair on warfarin, gout who presents to the ED for acute dyspnea.  History limited as patient is currently on BiPAP.  Patient reports worsening dyspnea for the last 4 days.  States that at baseline she suffers with orthopnea and sleeps with 4 pillows however no she cannot lean back at all without dyspnea.  Denies chest pain, palpitations but reports chest tightness that makes it difficult for her to breathe.  Placed on BiPAP in ED which improved her dyspnea.  Hypertensive in triage.  Labs show respiratory acidosis.  CXR shows interstitial opacities consistent with CHF exacerbation.  ED reached out to patient cardiologist, Dr Manning, who will come to see the patient.  Will admit to tele.     (16 Sep 2020 00:42)      SUBJECTIVE & OBJECTIVE: Pt seen and examined at bedside.   no overnight events   states does not feel good. + dizziness, body aches, +SOB but states SOB improved somewhat   + LE swelling   +orthopnea , sleeps on 4 pillows     Denies fever, chills, N/V, dizziness, HA, cough, CP, palpitations, abdominal pain, dysuria, diarrhea, constipation.   denies recent travel or sick contacts.     PHYSICAL EXAM:  T(C): 37.1 (09-17-20 @ 11:28), Max: 37.1 (09-17-20 @ 11:28)  HR: 75 (09-17-20 @ 11:28) (50 - 75)  BP: 126/63 (09-17-20 @ 11:28) (110/47 - 153/71)  RR: 18 (09-17-20 @ 11:28) (17 - 19)  SpO2: 98% (09-17-20 @ 11:28) (94% - 99%)  Wt(kg): --   I&O's Detail    16 Sep 2020 07:01  -  17 Sep 2020 07:00  --------------------------------------------------------  IN:    Oral Fluid: 240 mL  Total IN: 240 mL    OUT:    Voided (mL): 200 mL  Total OUT: 200 mL    Total NET: 40 mL        GENERAL: obese, NAD, speaking in full sentences, no use of accessory muscles   HEAD:  Atraumatic, Normocephalic  EYES: EOMI, PERRLA, conjunctiva and sclera clear  ENMT: Moist mucous membranes  NECK: Supple, No JVD  NERVOUS SYSTEM:  Alert & Oriented X3, nonfocal  CHEST/LUNG: good b/l air entry, mild crackles at the bases. no w/r/r  HEART: Regular rate and rhythm; No murmurs, rubs, or gallops  ABDOMEN: Soft, Nontender, Nondistended; Bowel sounds present  EXTREMITIES:  2+ Peripheral Pulses b/l, No clubbing, cyanosis b/l.  3+ edema b/l LE    MEDICATIONS  (STANDING):  allopurinol 100 milliGRAM(s) Oral daily  budesonide 160 MICROgram(s)/formoterol 4.5 MICROgram(s) Inhaler 2 Puff(s) Inhalation two times a day  furosemide   Injectable 40 milliGRAM(s) IV Push two times a day  metoprolol tartrate 25 milliGRAM(s) Oral two times a day  simvastatin 40 milliGRAM(s) Oral at bedtime  tiotropium 18 MICROgram(s) Capsule 1 Capsule(s) Inhalation daily  warfarin 5 milliGRAM(s) Oral once    MEDICATIONS  (PRN):  acetaminophen   Tablet .. 650 milliGRAM(s) Oral every 6 hours PRN Temp greater or equal to 38C (100.4F), Mild Pain (1 - 3)  albuterol/ipratropium for Nebulization 3 milliLiter(s) Nebulizer every 6 hours PRN Shortness of Breath and/or Wheezing      LABS:                        7.6    6.30  )-----------( 292      ( 17 Sep 2020 06:47 )             27.3     09-17    137  |  97  |  48<H>  ----------------------------<  93  4.1   |  38<H>  |  1.39<H>    Ca    9.3      17 Sep 2020 06:47  Mg     2.3     09-17    TPro  8.7<H>  /  Alb  3.3  /  TBili  0.3  /  DBili  x   /  AST  26  /  ALT  22  /  AlkPhos  142<H>  09-15    PT/INR - ( 17 Sep 2020 06:47 )   PT: 25.9 sec;   INR: 2.32 ratio         PTT - ( 15 Sep 2020 20:03 )  PTT:45.7 sec    Magnesium, Serum: 2.3 mg/dL (09-17 @ 10:20)    CAPILLARY BLOOD GLUCOSE        ABG - ( 16 Sep 2020 00:24 )  pH, Arterial: x     pH, Blood: 7.36  /  pCO2: 56    /  pO2: 99    / HCO3: 31    / Base Excess: 5.4   /  SaO2: 98                CARDIAC MARKERS ( 15 Sep 2020 20:03 )  <.015 ng/mL / x     / 35 U/L / x     / <1.0 ng/mL        RECENT CULTURES:      RADIOLOGY & ADDITIONAL TESTS:  < from: Xray Chest 1 View-PORTABLE IMMEDIATE (09.15.20 @ 19:35) >  IMPRESSION:  1. Cardiomegaly.  2. Pulmonary edema changes.  3. Small bilateral pleural effusions.    < end of copied text >    6/21/20 ECHO: EF 60-65%, grade II DD, mild concentric LVH, MVR  Imaging Personally Reviewed:  [ x] YES  [ ] NO    Consultant(s) Notes Reviewed:  [ x] YES  [ ] NO    Care Discussed with Consultants/Other Providers [ x] YES  [ ] NO  Care discussed in detail with patient.  All questions and concerns addressed

## 2020-09-17 NOTE — CONSULT NOTE ADULT - SUBJECTIVE AND OBJECTIVE BOX
CARDIOLOGY CONSULT NOTE    Patient is a 70y Female with a known history of :  Dyslipidemia [E78.5]    Chronic gout without tophus, unspecified cause, unspecified site [M1A.[id number]]    Essential hypertension [I10]    Chronic obstructive pulmonary disease, unspecified COPD type [J44.9]    Mitral valve replaced [Z95.2]    Acute on chronic systolic (congestive) heart failure [I50.23]      HPI:  70F with a PMH of HTN, CHF, COPD?, long valvular cardiac hx... as per Dr. Manning she is s/p MVR for MS with subsequent progression of MS, TVR 2012 now with severe TR and severe pulm HTN, hx of GIB but unable to scope 2/2 valvular heart diusease... HCT high 20s, gout; presents to the ED with dyspnea.   States that at baseline she suffers with orthopnea and sleeps with 4 pillows.  Denies chest pain, palpitations, syncope.    Placed on BiPAP in ED which improved her dyspnea; diuresed.    CXR shows interstitial opacities consistent with CHF exacerbation.  ECG:  sinus 57bpm; RBBB  Carlos neg x 1      REVIEW OF SYSTEMS:    CONSTITUTIONAL: + fatigue  EYES: No eye pain, visual disturbances, or discharge  ENMT:  No difficulty hearing, tinnitus, vertigo; No sinus or throat pain  NECK: No pain or stiffness  BREASTS: No pain, masses, or nipple discharge  RESPIRATORY: No cough, wheezing, chills or hemoptysis; + shortness of breath  CARDIOVASCULAR: No chest pain, palpitations, dizziness; + leg swelling  GASTROINTESTINAL: No abdominal or epigastric pain. No nausea, vomiting, or hematemesis; No diarrhea or constipation. No melena or hematochezia.  GENITOURINARY: No dysuria, frequency, hematuria, or incontinence  NEUROLOGICAL: No headaches, memory loss, loss of strength, numbness, or tremors  SKIN: No itching, burning, rashes, or lesions   LYMPH NODES: No enlarged glands  ENDOCRINE: No heat or cold intolerance; No hair loss  MUSCULOSKELETAL: No joint pain or swelling; No muscle, back, or extremity pain  PSYCHIATRIC: No depression, anxiety, mood swings, or difficulty sleeping  HEME/LYMPH: No easy bruising, or bleeding gums  ALLERGY AND IMMUNOLOGIC: No hives or eczema    MEDICATIONS  (STANDING):  allopurinol 100 milliGRAM(s) Oral daily  budesonide 160 MICROgram(s)/formoterol 4.5 MICROgram(s) Inhaler 2 Puff(s) Inhalation two times a day  furosemide   Injectable 40 milliGRAM(s) IV Push two times a day  furosemide   Injectable 40 milliGRAM(s) IV Push once  metoprolol tartrate 25 milliGRAM(s) Oral two times a day  simvastatin 40 milliGRAM(s) Oral at bedtime  tiotropium 18 MICROgram(s) Capsule 1 Capsule(s) Inhalation daily  warfarin 5 milliGRAM(s) Oral once    MEDICATIONS  (PRN):  acetaminophen   Tablet .. 650 milliGRAM(s) Oral every 6 hours PRN Temp greater or equal to 38C (100.4F), Mild Pain (1 - 3)  albuterol/ipratropium for Nebulization 3 milliLiter(s) Nebulizer every 6 hours PRN Shortness of Breath and/or Wheezing      ALLERGIES: No Known Allergies      FAMILY HISTORY:      PHYSICAL EXAMINATION:  -----------------------------  T(C): 36.4 (09-17-20 @ 16:54), Max: 37.1 (09-17-20 @ 11:28)  HR: 75 (09-17-20 @ 16:54) (50 - 75)  BP: 143/66 (09-17-20 @ 16:54) (110/47 - 143/66)  RR: 17 (09-17-20 @ 16:54) (17 - 19)  SpO2: 96% (09-17-20 @ 16:54) (96% - 99%)      Constitutional: well developed, normal appearance, well groomed, well nourished, no deformities and no acute distress.   Eyes: the conjunctiva exhibited no abnormalities and the eyelids demonstrated no xanthelasmas.   HEENT: normal oral mucosa, no oral pallor and no oral cyanosis.   Neck: normal jugular venous A waves present, normal jugular venous V waves present and no jugular venous ross A waves.   Pulmonary: diminished at bases/coarse  Cardiovascular: heart rate and rhythm were normal; 2/6 SM  Abdomen: soft, non-tender, no hepato-splenomegaly and no abdominal mass palpated.   Musculoskeletal: the gait could not be assessed..   Extremities: no clubbing of the fingernails, no localized cyanosis, no petechial hemorrhages and no ischemic changes.   Skin: normal skin color and pigmentation, no rash, no venous stasis, no skin lesions, no skin ulcer and no xanthoma was observed.   Psychiatric: oriented to person, place, and time, the affect was normal, the mood was normal and not feeling anxious.       LABS:   --------  09-17    137  |  97  |  48<H>  ----------------------------<  93  4.1   |  38<H>  |  1.39<H>    Ca    9.3      17 Sep 2020 06:47  Mg     2.3     09-17    TPro  8.7<H>  /  Alb  3.3  /  TBili  0.3  /  DBili  x   /  AST  26  /  ALT  22  /  AlkPhos  142<H>  09-15                         7.6    6.30  )-----------( 292      ( 17 Sep 2020 06:47 )             27.3     PT/INR - ( 17 Sep 2020 06:47 )   PT: 25.9 sec;   INR: 2.32 ratio         PTT - ( 15 Sep 2020 20:03 )  PTT:45.7 sec    09-15 @ 20:03 CPK total:--, CKMB --, Troponin I - <.015 ng/mL          RADIOLOGY:  -----------------    ECG:  sinus 57bpm; RBBB

## 2020-09-17 NOTE — CONSULT NOTE ADULT - ASSESSMENT
70F with a PMH of HTN, CHF, COPD?, long valvular cardiac hx... as per Dr. Manning she is s/p MVR for MS with subsequent progression of MS, TVR 2012 now with severe TR and severe pulm HTN, hx of GIB but unable to scope 2/2 valvular heart diusease... HCT high 20s, gout; presents to the ED with dyspnea.   States that at baseline she suffers with orthopnea and sleeps with 4 pillows.  Denies chest pain, palpitations, syncope.    Placed on BiPAP in ED which improved her dyspnea; diuresed.    CXR shows interstitial opacities consistent with CHF exacerbation.  ECG:  sinus 57bpm; RBBB  Carlos neg x 1    -cont diuresis  -limited therapeutic options at ; plan to transfer to [facility] for CTS and HF evaluations

## 2020-09-18 ENCOUNTER — INPATIENT (INPATIENT)
Facility: HOSPITAL | Age: 70
LOS: 30 days | Discharge: HOME HEALTH | DRG: 286 | End: 2020-10-19
Attending: INTERNAL MEDICINE | Admitting: THORACIC SURGERY (CARDIOTHORACIC VASCULAR SURGERY)
Payer: MEDICARE

## 2020-09-18 ENCOUNTER — TRANSCRIPTION ENCOUNTER (OUTPATIENT)
Age: 70
End: 2020-09-18

## 2020-09-18 VITALS
DIASTOLIC BLOOD PRESSURE: 59 MMHG | OXYGEN SATURATION: 100 % | TEMPERATURE: 98 F | HEART RATE: 69 BPM | SYSTOLIC BLOOD PRESSURE: 122 MMHG

## 2020-09-18 VITALS
WEIGHT: 239.2 LBS | DIASTOLIC BLOOD PRESSURE: 80 MMHG | OXYGEN SATURATION: 98 % | SYSTOLIC BLOOD PRESSURE: 152 MMHG | RESPIRATION RATE: 18 BRPM | TEMPERATURE: 98 F | HEIGHT: 64 IN | HEART RATE: 75 BPM

## 2020-09-18 DIAGNOSIS — M10.9 GOUT, UNSPECIFIED: ICD-10-CM

## 2020-09-18 DIAGNOSIS — I48.91 UNSPECIFIED ATRIAL FIBRILLATION: ICD-10-CM

## 2020-09-18 DIAGNOSIS — Z95.4 PRESENCE OF OTHER HEART-VALVE REPLACEMENT: Chronic | ICD-10-CM

## 2020-09-18 DIAGNOSIS — I50.9 HEART FAILURE, UNSPECIFIED: ICD-10-CM

## 2020-09-18 DIAGNOSIS — I34.2 NONRHEUMATIC MITRAL (VALVE) STENOSIS: ICD-10-CM

## 2020-09-18 DIAGNOSIS — G47.33 OBSTRUCTIVE SLEEP APNEA (ADULT) (PEDIATRIC): ICD-10-CM

## 2020-09-18 LAB
ALBUMIN SERPL ELPH-MCNC: 3.3 G/DL — SIGNIFICANT CHANGE UP (ref 3.3–5)
ALBUMIN SERPL ELPH-MCNC: 4.2 G/DL — SIGNIFICANT CHANGE UP (ref 3.3–5)
ALP SERPL-CCNC: 125 U/L — HIGH (ref 40–120)
ALP SERPL-CCNC: 128 U/L — HIGH (ref 40–120)
ALT FLD-CCNC: 11 U/L — SIGNIFICANT CHANGE UP (ref 10–45)
ALT FLD-CCNC: 18 U/L — SIGNIFICANT CHANGE UP (ref 12–78)
ANION GAP SERPL CALC-SCNC: 10 MMOL/L — SIGNIFICANT CHANGE UP (ref 5–17)
ANION GAP SERPL CALC-SCNC: 6 MMOL/L — SIGNIFICANT CHANGE UP (ref 5–17)
APTT BLD: 47.9 SEC — HIGH (ref 27.5–35.5)
AST SERPL-CCNC: 23 U/L — SIGNIFICANT CHANGE UP (ref 10–40)
AST SERPL-CCNC: 26 U/L — SIGNIFICANT CHANGE UP (ref 15–37)
BILIRUB SERPL-MCNC: 0.5 MG/DL — SIGNIFICANT CHANGE UP (ref 0.2–1.2)
BILIRUB SERPL-MCNC: 0.7 MG/DL — SIGNIFICANT CHANGE UP (ref 0.2–1.2)
BLD GP AB SCN SERPL QL: NEGATIVE — SIGNIFICANT CHANGE UP
BLD GP AB SCN SERPL QL: SIGNIFICANT CHANGE UP
BUN SERPL-MCNC: 49 MG/DL — HIGH (ref 7–23)
BUN SERPL-MCNC: 53 MG/DL — HIGH (ref 7–23)
CALCIUM SERPL-MCNC: 10.3 MG/DL — SIGNIFICANT CHANGE UP (ref 8.4–10.5)
CALCIUM SERPL-MCNC: 9.5 MG/DL — SIGNIFICANT CHANGE UP (ref 8.5–10.1)
CHLORIDE SERPL-SCNC: 94 MMOL/L — LOW (ref 96–108)
CHLORIDE SERPL-SCNC: 99 MMOL/L — SIGNIFICANT CHANGE UP (ref 96–108)
CO2 SERPL-SCNC: 34 MMOL/L — HIGH (ref 22–31)
CO2 SERPL-SCNC: 35 MMOL/L — HIGH (ref 22–31)
CREAT SERPL-MCNC: 1.35 MG/DL — HIGH (ref 0.5–1.3)
CREAT SERPL-MCNC: 1.43 MG/DL — HIGH (ref 0.5–1.3)
GLUCOSE SERPL-MCNC: 135 MG/DL — HIGH (ref 70–99)
GLUCOSE SERPL-MCNC: 92 MG/DL — SIGNIFICANT CHANGE UP (ref 70–99)
HCT VFR BLD CALC: 32.2 % — LOW (ref 34.5–45)
HCT VFR BLD CALC: 33.1 % — LOW (ref 34.5–45)
HGB BLD-MCNC: 9.3 G/DL — LOW (ref 11.5–15.5)
HGB BLD-MCNC: 9.5 G/DL — LOW (ref 11.5–15.5)
INR BLD: 2.18 RATIO — HIGH (ref 0.88–1.16)
INR BLD: 2.42 RATIO — HIGH (ref 0.88–1.16)
LDH SERPL L TO P-CCNC: 310 U/L — HIGH (ref 50–242)
MAGNESIUM SERPL-MCNC: 2.2 MG/DL — SIGNIFICANT CHANGE UP (ref 1.6–2.6)
MCHC RBC-ENTMCNC: 25.5 PG — LOW (ref 27–34)
MCHC RBC-ENTMCNC: 25.5 PG — LOW (ref 27–34)
MCHC RBC-ENTMCNC: 28.7 GM/DL — LOW (ref 32–36)
MCHC RBC-ENTMCNC: 28.9 GM/DL — LOW (ref 32–36)
MCV RBC AUTO: 88.2 FL — SIGNIFICANT CHANGE UP (ref 80–100)
MCV RBC AUTO: 88.7 FL — SIGNIFICANT CHANGE UP (ref 80–100)
NRBC # BLD: 0 /100 WBCS — SIGNIFICANT CHANGE UP (ref 0–0)
NRBC # BLD: 0 /100 WBCS — SIGNIFICANT CHANGE UP (ref 0–0)
PHOSPHATE SERPL-MCNC: 3.4 MG/DL — SIGNIFICANT CHANGE UP (ref 2.5–4.5)
PLATELET # BLD AUTO: 339 K/UL — SIGNIFICANT CHANGE UP (ref 150–400)
PLATELET # BLD AUTO: 346 K/UL — SIGNIFICANT CHANGE UP (ref 150–400)
POTASSIUM SERPL-MCNC: 4.1 MMOL/L — SIGNIFICANT CHANGE UP (ref 3.5–5.3)
POTASSIUM SERPL-MCNC: 4.1 MMOL/L — SIGNIFICANT CHANGE UP (ref 3.5–5.3)
POTASSIUM SERPL-SCNC: 4.1 MMOL/L — SIGNIFICANT CHANGE UP (ref 3.5–5.3)
POTASSIUM SERPL-SCNC: 4.1 MMOL/L — SIGNIFICANT CHANGE UP (ref 3.5–5.3)
PROT SERPL-MCNC: 8.4 G/DL — HIGH (ref 6–8.3)
PROT SERPL-MCNC: 9 GM/DL — HIGH (ref 6–8.3)
PROTHROM AB SERPL-ACNC: 24.4 SEC — HIGH (ref 10.6–13.6)
PROTHROM AB SERPL-ACNC: 27.6 SEC — HIGH (ref 10.6–13.6)
RBC # BLD: 3.65 M/UL — LOW (ref 3.8–5.2)
RBC # BLD: 3.73 M/UL — LOW (ref 3.8–5.2)
RBC # FLD: 17.7 % — HIGH (ref 10.3–14.5)
RBC # FLD: 18 % — HIGH (ref 10.3–14.5)
RH IG SCN BLD-IMP: POSITIVE — SIGNIFICANT CHANGE UP
SODIUM SERPL-SCNC: 138 MMOL/L — SIGNIFICANT CHANGE UP (ref 135–145)
SODIUM SERPL-SCNC: 140 MMOL/L — SIGNIFICANT CHANGE UP (ref 135–145)
WBC # BLD: 7.61 K/UL — SIGNIFICANT CHANGE UP (ref 3.8–10.5)
WBC # BLD: 8.07 K/UL — SIGNIFICANT CHANGE UP (ref 3.8–10.5)
WBC # FLD AUTO: 7.61 K/UL — SIGNIFICANT CHANGE UP (ref 3.8–10.5)
WBC # FLD AUTO: 8.07 K/UL — SIGNIFICANT CHANGE UP (ref 3.8–10.5)

## 2020-09-18 PROCEDURE — 99222 1ST HOSP IP/OBS MODERATE 55: CPT

## 2020-09-18 PROCEDURE — 93010 ELECTROCARDIOGRAM REPORT: CPT

## 2020-09-18 PROCEDURE — 71045 X-RAY EXAM CHEST 1 VIEW: CPT | Mod: 26

## 2020-09-18 PROCEDURE — 99239 HOSP IP/OBS DSCHRG MGMT >30: CPT

## 2020-09-18 RX ORDER — SIMVASTATIN 20 MG/1
1 TABLET, FILM COATED ORAL
Qty: 0 | Refills: 0 | DISCHARGE
Start: 2020-09-18

## 2020-09-18 RX ORDER — METOPROLOL TARTRATE 50 MG
1 TABLET ORAL
Qty: 0 | Refills: 0 | DISCHARGE
Start: 2020-09-18

## 2020-09-18 RX ORDER — IPRATROPIUM/ALBUTEROL SULFATE 18-103MCG
3 AEROSOL WITH ADAPTER (GRAM) INHALATION
Qty: 0 | Refills: 0 | DISCHARGE
Start: 2020-09-18

## 2020-09-18 RX ORDER — ATORVASTATIN CALCIUM 80 MG/1
40 TABLET, FILM COATED ORAL AT BEDTIME
Refills: 0 | Status: DISCONTINUED | OUTPATIENT
Start: 2020-09-18 | End: 2020-10-19

## 2020-09-18 RX ORDER — PANTOPRAZOLE SODIUM 20 MG/1
1 TABLET, DELAYED RELEASE ORAL
Qty: 0 | Refills: 0 | DISCHARGE
Start: 2020-09-18

## 2020-09-18 RX ORDER — PANTOPRAZOLE SODIUM 20 MG/1
40 TABLET, DELAYED RELEASE ORAL
Refills: 0 | Status: DISCONTINUED | OUTPATIENT
Start: 2020-09-18 | End: 2020-10-19

## 2020-09-18 RX ORDER — METOPROLOL TARTRATE 50 MG
1 TABLET ORAL
Qty: 0 | Refills: 0 | DISCHARGE

## 2020-09-18 RX ORDER — RAMIPRIL 5 MG
1 CAPSULE ORAL
Qty: 0 | Refills: 0 | DISCHARGE

## 2020-09-18 RX ORDER — ACETAMINOPHEN 500 MG
650 TABLET ORAL EVERY 6 HOURS
Refills: 0 | Status: DISCONTINUED | OUTPATIENT
Start: 2020-09-18 | End: 2020-10-19

## 2020-09-18 RX ORDER — METOPROLOL TARTRATE 50 MG
25 TABLET ORAL
Refills: 0 | Status: DISCONTINUED | OUTPATIENT
Start: 2020-09-18 | End: 2020-10-19

## 2020-09-18 RX ORDER — IPRATROPIUM/ALBUTEROL SULFATE 18-103MCG
3 AEROSOL WITH ADAPTER (GRAM) INHALATION EVERY 6 HOURS
Refills: 0 | Status: DISCONTINUED | OUTPATIENT
Start: 2020-09-18 | End: 2020-10-19

## 2020-09-18 RX ORDER — FUROSEMIDE 40 MG
40 TABLET ORAL
Refills: 0 | Status: DISCONTINUED | OUTPATIENT
Start: 2020-09-18 | End: 2020-09-20

## 2020-09-18 RX ORDER — FUROSEMIDE 40 MG
40 TABLET ORAL
Qty: 0 | Refills: 0 | DISCHARGE
Start: 2020-09-18

## 2020-09-18 RX ORDER — LIDOCAINE 4 G/100G
1 CREAM TOPICAL EVERY 24 HOURS
Refills: 0 | Status: DISCONTINUED | OUTPATIENT
Start: 2020-09-18 | End: 2020-10-19

## 2020-09-18 RX ORDER — BUDESONIDE AND FORMOTEROL FUMARATE DIHYDRATE 160; 4.5 UG/1; UG/1
2 AEROSOL RESPIRATORY (INHALATION)
Qty: 0 | Refills: 0 | DISCHARGE
Start: 2020-09-18

## 2020-09-18 RX ORDER — ALLOPURINOL 300 MG
100 TABLET ORAL DAILY
Refills: 0 | Status: DISCONTINUED | OUTPATIENT
Start: 2020-09-18 | End: 2020-10-19

## 2020-09-18 RX ORDER — ACETAMINOPHEN 500 MG
2 TABLET ORAL
Qty: 0 | Refills: 0 | DISCHARGE
Start: 2020-09-18

## 2020-09-18 RX ADMIN — Medication 3 MILLILITER(S): at 05:26

## 2020-09-18 RX ADMIN — LIDOCAINE 1 PATCH: 4 CREAM TOPICAL at 22:09

## 2020-09-18 RX ADMIN — Medication 650 MILLIGRAM(S): at 22:39

## 2020-09-18 RX ADMIN — Medication 40 MILLIGRAM(S): at 06:00

## 2020-09-18 RX ADMIN — Medication 3 MILLILITER(S): at 11:27

## 2020-09-18 RX ADMIN — ATORVASTATIN CALCIUM 40 MILLIGRAM(S): 80 TABLET, FILM COATED ORAL at 22:11

## 2020-09-18 RX ADMIN — Medication 40 MILLIGRAM(S): at 02:15

## 2020-09-18 RX ADMIN — PANTOPRAZOLE SODIUM 40 MILLIGRAM(S): 20 TABLET, DELAYED RELEASE ORAL at 06:01

## 2020-09-18 RX ADMIN — Medication 650 MILLIGRAM(S): at 22:09

## 2020-09-18 RX ADMIN — Medication 100 MILLIGRAM(S): at 11:27

## 2020-09-18 NOTE — PROGRESS NOTE ADULT - ASSESSMENT
70F with a PMH of HTN, CHF, COPD?, long valvular cardiac hx,  as per Dr. Manning she is s/p MVR for MS with subsequent progression of MS, TVr 2012 now with severe TR and severe pulm HTN, hx of GIB but unable to scope 2/2 valvular heart disease, HCT high 20s, gout; presents to the ED with dyspnea.   States that at baseline she suffers with orthopnea and sleeps with 4 pillows.  Denies chest pain, palpitations, syncope.    Placed on BiPAP in ED which improved her dyspnea; diuresed.    CXR shows interstitial opacities consistent with CHF exacerbation.  ECG:  sinus 57bpm; RBBB  BNP 1300  Trop neg x 1    Plan  -cont diuresis, monitor electrolytes/renal function/weight   -S/P PRBC for low h/h  -Cont supplemental O2, COPD management   -For transfer to [facility] for CTS and HF evaluations 70F with a PMH of HTN, Afib, CHF, COPD?, long valvular cardiac hx,  as per Dr. Manning she is s/p MVR for MS with subsequent progression of MS, TVR 2012 now with severe TR and severe pulm HTN, hx of GIB but unable to scope 2/2 valvular heart disease, HCT high 20s, gout; presents to the ED with dyspnea.   States that at baseline she suffers with orthopnea and sleeps with 4 pillows.  Denies chest pain, palpitations, syncope.    Placed on BiPAP in ED which improved her dyspnea; diuresed.    CXR shows interstitial opacities consistent with CHF exacerbation.  ECG:  sinus 57bpm; RBBB  BNP 1300  Trop neg x 1    Plan  -cont diuresis, monitor electrolytes/renal function/weight   -Cont with bbl/statin  -On coumadin for AC for Afib, monitor h/h closely  -H/H improved S/P PRBC transfusion  -Cont supplemental O2, COPD management   -For transfer to [facility] for CTS and HF evaluations, Dr. Fernandez

## 2020-09-18 NOTE — PROGRESS NOTE ADULT - SUBJECTIVE AND OBJECTIVE BOX
Patient is a 70y old  Female who presents with a chief complaint of dyspnea (18 Sep 2020 11:40)    PAST MEDICAL & SURGICAL HISTORY:  COPD (chronic obstructive pulmonary disease)    Hypertension    CHF (congestive heart failure)    VHD    MVR    TVr    INTERVAL HISTORY:  	  MEDICATIONS:  MEDICATIONS  (STANDING):  albuterol/ipratropium for Nebulization 3 milliLiter(s) Nebulizer every 6 hours  allopurinol 100 milliGRAM(s) Oral daily  budesonide 160 MICROgram(s)/formoterol 4.5 MICROgram(s) Inhaler 2 Puff(s) Inhalation two times a day  furosemide   Injectable 40 milliGRAM(s) IV Push two times a day  metoprolol tartrate 25 milliGRAM(s) Oral two times a day  pantoprazole    Tablet 40 milliGRAM(s) Oral before breakfast  simvastatin 40 milliGRAM(s) Oral at bedtime    MEDICATIONS  (PRN):  acetaminophen   Tablet .. 650 milliGRAM(s) Oral every 6 hours PRN Temp greater or equal to 38C (100.4F), Mild Pain (1 - 3)    Vitals:  T(F): 98 (09-18-20 @ 12:04), Max: 98.2 (09-17-20 @ 22:11)  HR: 69 (09-18-20 @ 12:04) (54 - 75)  BP: 122/59 (09-18-20 @ 12:04) (111/41 - 143/66)  RR: 18 (09-18-20 @ 05:00) (16 - 18)  SpO2: 100% (09-18-20 @ 12:04) (94% - 100%)    09-17 @ 07:01  -  09-18 @ 07:00  --------------------------------------------------------  IN:    Oral Fluid: 240 mL  Total IN: 240 mL    OUT:  Total OUT: 0 mL    Total NET: 240 mL    Weight (kg): 113.3 (09-16 @ 01:50)  BMI (kg/m2): 42.9 (09-16 @ 01:50)    PHYSICAL EXAM:  Neuro: Awake, responsive  CV: S1 S2 RRR + SM  Lungs:  GI: Soft, BS +, ND, NT  Extremities: No edema    TELEMETRY: RSR    RADIOLOGY: < from: Xray Chest 1 View-PORTABLE IMMEDIATE (09.15.20 @ 19:35) >  1. Cardiomegaly.  2. Pulmonary edema changes.  3. Small bilateral pleural effusions.    < end of copied text >    DIAGNOSTIC TESTING:    [p ] Echocardiogram:     LABS:	 	    CARDIAC MARKERS:  Troponin I, Serum: <.015 ng/mL (09-15 @ 20:03)    18 Sep 2020 08:00    140    |  99     |  53     ----------------------------<  92     4.1     |  35     |  1.35   17 Sep 2020 06:47    137    |  97     |  48     ----------------------------<  93     4.1     |  38     |  1.39   16 Sep 2020 09:27    139    |  99     |  39     ----------------------------<  106    4.4     |  37     |  1.30     Ca    9.5        18 Sep 2020 08:00  Phos  3.4       18 Sep 2020 08:00  Mg     2.2       18 Sep 2020 08:00    TPro  9.0    /  Alb  3.3    /  TBili  0.7    /  DBili  x      /  AST  26     /  ALT  18     /  AlkPhos  125    18 Sep 2020 08:00                          9.3    7.61  )-----------( 339      ( 18 Sep 2020 08:00 )             32.2 ,                       7.6    6.30  )-----------( 292      ( 17 Sep 2020 06:47 )             27.3 ,                       7.9    6.78  )-----------( 311      ( 16 Sep 2020 09:27 )             29.1 ,                       7.9    6.59  )-----------( 304      ( 15 Sep 2020 20:03 )             29.8   proBNP: Serum Pro-Brain Natriuretic Peptide: 1341 pg/mL (09-15 @ 20:25)    PT/PTT- ( 18 Sep 2020 08:00 )   PT: 24.4 sec;  PTT: x        INR: 2.18 ratio (09-18 @ 08:00)  INR: 2.32 ratio (09-17 @ 06:47)  INR: 2.45 ratio (09-16 @ 09:27)  INR: 2.25 ratio (09-15 @ 20:03)           Patient is a 70y old  Female who presents with a chief complaint of dyspnea (18 Sep 2020 11:40)    PAST MEDICAL & SURGICAL HISTORY:  COPD (chronic obstructive pulmonary disease)    Hypertension    CHF (congestive heart failure)    Afib    VHD    MVR    TVR    INTERVAL HISTORY: Resting in bed, still with mild dyspnea   	  MEDICATIONS:  MEDICATIONS  (STANDING):  albuterol/ipratropium for Nebulization 3 milliLiter(s) Nebulizer every 6 hours  allopurinol 100 milliGRAM(s) Oral daily  budesonide 160 MICROgram(s)/formoterol 4.5 MICROgram(s) Inhaler 2 Puff(s) Inhalation two times a day  furosemide   Injectable 40 milliGRAM(s) IV Push two times a day  metoprolol tartrate 25 milliGRAM(s) Oral two times a day  pantoprazole    Tablet 40 milliGRAM(s) Oral before breakfast  simvastatin 40 milliGRAM(s) Oral at bedtime    MEDICATIONS  (PRN):  acetaminophen   Tablet .. 650 milliGRAM(s) Oral every 6 hours PRN Temp greater or equal to 38C (100.4F), Mild Pain (1 - 3)    Vitals:  T(F): 98 (09-18-20 @ 12:04), Max: 98.2 (09-17-20 @ 22:11)  HR: 69 (09-18-20 @ 12:04) (54 - 75)  BP: 122/59 (09-18-20 @ 12:04) (111/41 - 143/66)  RR: 18 (09-18-20 @ 05:00) (16 - 18)  SpO2: 100% (09-18-20 @ 12:04) (94% - 100%)    09-17 @ 07:01  -  09-18 @ 07:00  --------------------------------------------------------  IN:    Oral Fluid: 240 mL  Total IN: 240 mL    OUT:  Total OUT: 0 mL    Total NET: 240 mL    Weight (kg): 113.3 (09-16 @ 01:50)  BMI (kg/m2): 42.9 (09-16 @ 01:50)    PHYSICAL EXAM:  Neuro: Awake, responsive  CV: S1 S2 irregular, + SM  Lungs: few rales to base   GI: Soft, BS +, ND, NT  Extremities: + LE edema    TELEMETRY: RSR    RADIOLOGY: < from: Xray Chest 1 View-PORTABLE IMMEDIATE (09.15.20 @ 19:35) >  1. Cardiomegaly.  2. Pulmonary edema changes.  3. Small bilateral pleural effusions.    < end of copied text >    DIAGNOSTIC TESTING:    [p ] Echocardiogram:     LABS:	 	    CARDIAC MARKERS:  Troponin I, Serum: <.015 ng/mL (09-15 @ 20:03)    18 Sep 2020 08:00    140    |  99     |  53     ----------------------------<  92     4.1     |  35     |  1.35   17 Sep 2020 06:47    137    |  97     |  48     ----------------------------<  93     4.1     |  38     |  1.39   16 Sep 2020 09:27    139    |  99     |  39     ----------------------------<  106    4.4     |  37     |  1.30     Ca    9.5        18 Sep 2020 08:00  Phos  3.4       18 Sep 2020 08:00  Mg     2.2       18 Sep 2020 08:00    TPro  9.0    /  Alb  3.3    /  TBili  0.7    /  DBili  x      /  AST  26     /  ALT  18     /  AlkPhos  125    18 Sep 2020 08:00                          9.3    7.61  )-----------( 339      ( 18 Sep 2020 08:00 )             32.2 ,                       7.6    6.30  )-----------( 292      ( 17 Sep 2020 06:47 )             27.3 ,                       7.9    6.78  )-----------( 311      ( 16 Sep 2020 09:27 )             29.1 ,                       7.9    6.59  )-----------( 304      ( 15 Sep 2020 20:03 )             29.8   proBNP: Serum Pro-Brain Natriuretic Peptide: 1341 pg/mL (09-15 @ 20:25)    PT/PTT- ( 18 Sep 2020 08:00 )   PT: 24.4 sec;  PTT: x        INR: 2.18 ratio (09-18 @ 08:00)  INR: 2.32 ratio (09-17 @ 06:47)  INR: 2.45 ratio (09-16 @ 09:27)  INR: 2.25 ratio (09-15 @ 20:03)

## 2020-09-18 NOTE — DISCHARGE NOTE PROVIDER - CARE PROVIDERS DIRECT ADDRESSES
[email or web address]

## 2020-09-18 NOTE — DISCHARGE NOTE PROVIDER - NSDCMRMEDTOKEN_GEN_ALL_CORE_FT
acetaminophen 325 mg oral tablet: 2 tab(s) orally every 6 hours, As needed, Temp greater or equal to 38C (100.4F), Mild Pain (1 - 3)  allopurinol 100 mg oral tablet: 1 tab(s) orally once a day  budesonide-formoterol 160 mcg-4.5 mcg/inh inhalation aerosol: 2  inhaled 2 times a day  furosemide 100 mg/100 mL-0.9% intravenous solution: 40 milliliter(s) intravenous 2 times a day  ipratropium-albuterol 0.5 mg-2.5 mg/3 mLinhalation solution: 3 milliliter(s) inhaled every 6 hours  metoprolol tartrate 25 mg oral tablet: 1 tab(s) orally 2 times a day  pantoprazole 40 mg oral delayed release tablet: 1 tab(s) orally once a day (before a meal)  simvastatin 40 mg oral tablet: 1 tab(s) orally once a day (at bedtime)

## 2020-09-18 NOTE — DISCHARGE NOTE PROVIDER - NSDCCPCAREPLAN_GEN_ALL_CORE_FT
PRINCIPAL DISCHARGE DIAGNOSIS  Diagnosis: CHF (congestive heart failure)  Assessment and Plan of Treatment:       SECONDARY DISCHARGE DIAGNOSES  Diagnosis: Mitral valve replaced  Assessment and Plan of Treatment: Mitral valve replaced    Diagnosis: Chronic obstructive pulmonary disease, unspecified COPD type  Assessment and Plan of Treatment: Chronic obstructive pulmonary disease, unspecified COPD type    Diagnosis: Essential hypertension  Assessment and Plan of Treatment: Essential hypertension    Diagnosis: Chronic gout without tophus, unspecified cause, unspecified site  Assessment and Plan of Treatment: Chronic gout without tophus, unspecified cause, unspecified site    Diagnosis: Dyslipidemia  Assessment and Plan of Treatment: Dyslipidemia     PRINCIPAL DISCHARGE DIAGNOSIS  Diagnosis: CHF (congestive heart failure)  Assessment and Plan of Treatment:       SECONDARY DISCHARGE DIAGNOSES  Diagnosis: H/O mitral valve prolapse  Assessment and Plan of Treatment:     Diagnosis: Chronic obstructive pulmonary disease, unspecified COPD type  Assessment and Plan of Treatment: Chronic obstructive pulmonary disease, unspecified COPD type    Diagnosis: Essential hypertension  Assessment and Plan of Treatment: Essential hypertension    Diagnosis: Chronic gout without tophus, unspecified cause, unspecified site  Assessment and Plan of Treatment: Chronic gout without tophus, unspecified cause, unspecified site    Diagnosis: Dyslipidemia  Assessment and Plan of Treatment: Dyslipidemia

## 2020-09-18 NOTE — DISCHARGE NOTE PROVIDER - CARE PROVIDER_API CALL
Veronica Mac  CARDIOLOGY  [address], [place] [id number]  Phone: [phone]  Fax: [phone]  Follow Up Time:

## 2020-09-18 NOTE — DISCHARGE NOTE PROVIDER - HOSPITAL COURSE
70F with a PMH of HTN, CHF, COPD?, long valvular cardiac hx... as per Dr. Manning she is s/p MVR for MS with subsequent progression of MS, TVR 2012 now with severe TR and severe pulm HTN, hx of GIB but unable to scope 2/2 valvular heart diusease... HCT high 20s, gout; presents to the ED with dyspnea.  States that at baseline she suffers with orthopnea and sleeps with 4 pillows.  Patient diuresed well, breathing improved.  Plan is to transfer to [facility] for    70F with a PMH of HTN, CHF, COPD?, long valvular cardiac hx... as per Dr. Manning she is s/p MVR for MS with subsequent progression of MS, TVR 2012 now with severe TR and severe pulm HTN, hx of GIB but unable to scope 2/2 valvular heart diusease... HCT high 20s, gout; presents to the ED with dyspnea.  States that at baseline she suffers with orthopnea and sleeps with 4 pillows.  Patient diuresed well, breathing improved.  Plan is to transfer to [facility] for CT surgery consult.   70F with a PMH of HTN, CHF, COPD?, long valvular cardiac hx... as per Dr. Manning she is s/p MVR for MS with subsequent progression of MS, TVR 2012 now with severe TR and severe pulm HTN, hx of GIB but unable to scope 2/2 valvular heart disease, gout; presents to the ED with dyspnea.     PATIENT TRANSFERRED TO [facility].       Assessment and Plan:   1. Acute on chronic systolic and diastolic CHF exacerbation   valvular disease , s/p MVR for MS with subsequent progression of MS, TVR 2012 now with severe TR and severe pulm HTN  CXR shows interstitial opacities consistent with CHF exacerbation.  ECG:  sinus 57bpm; RBBB  Carlos neg x 1  ECG:  sinus 57bpm; RBBB  cardiology consult appreciated, plan to transfer patient to [facility] for CTS and HF evaluations as limited therapeutic options in [facility] VS , patient is agreeable. family at bedside aware of plan.   continue with diuresis   BIPAP prn   continue with home O2 3L NC to maintain SpO2 >92%   follow repeat ECHO  repeat CXR in AM (if patient still here)   on coumadin for valvular disease --INR therapeutic, give coumadin 5mg at bedtime today and monitor INR  monitor, daily weights, I/Os   monitor electrolytes and replete as necessary   HOBE     2. Acute respiratory failure with hypercapnia secondary to above   continue with BIPAP prn and at bedtime     3. Morbid obesity   BMI 42.9  nutrition consult appreciated     4. COPD on 3L NC home O2   continue with supplemental O2   duonebs Q6H   symbicort 2/2    5. Chronic blood loss anemia, unclear source   GIB history ?? although unable to scope in the past due to heart disease patient denies brbpr, melena, hemoptysis or hematemesis  will give 1u PRBC today with lasix 40mg IVP x 1 after transfusion to prevent acute pulm edema    in setting of heart disease, goal Hgb >8   obtain FOBT   will monitor H/H closely   will continue coumadin at this time as risks outweight benefits off AC  maintain active T&S   added PPI    6. gout, not in acute attack   continue with allopurinol     7. Preventative measures,   coumadin -dvt ppx  fall precautions

## 2020-09-18 NOTE — H&P ADULT - NSHPREVIEWOFSYSTEMS_GEN_ALL_CORE
General:  + weakness, + fatigue, fevers or chills  Skin: no itching, burning, rashes, or lesions   HEENT: no visual changes;  no headache, no vertigo, no recent colds, nasal stuffiness or sore throat   Neck: no pain, stiffness or swollen glands  Respiratory: no cough, sputum, wheezing, hemoptysis; +  shortness of breath  Cardiovascular: no chest pain, dyspnea or palpitations  GI: no abdominal or epigastric pain. no heartburn, nausea, vomiting, or hematemesis; no diarrhea or constipation. no melena or hematochezia  : no dysuria, frequency or hematuria  Peripheral Vascular: no intermittent claudication, leg cramps, varicose veins, swelling or swelling with redness and tenderness  Neuro: no changes in orientation, memory, insight or judgment, no changes in mood, attention or speech.  no dizziness, vertigo or fainting, numbness, tingling or weakness

## 2020-09-18 NOTE — H&P ADULT - ATTENDING COMMENTS
Pt seen and examined.  Acute on chronic diastolic HF, S/P MV repair, TV repair.  possible mitral stenosis.  TTE

## 2020-09-18 NOTE — H&P ADULT - NSHPPHYSICALEXAM_GEN_ALL_CORE
Physical Exam:     GENERAL: Elderly female, in no acute distress, well-developed  HEAD:  Atraumatic, Normocephalic  ENT: EOMI, PERRLA, conjunctiva and sclera clear, Neck supple, No JVD, moist mucosa, no pharyngeal erythema, no tonsillar enlargement or exudate  CHEST/LUNG: Clear to auscultation bilaterally; No wheeze, no rhonchi or rales, no crackles, equal breath sounds bilaterally   HEART: Regular rate and rhythm; No murmurs, rubs, or gallops  ABDOMEN: Soft, Nontender, Nondistended; Bowel sounds present, no organomegaly  EXTREMITIES:  No clubbing, cyanosis, or edema  PSYCH: Nl behavior, nl affect  NEUROLOGY: AAOx3, non-focal, cranial nerves intact  SKIN: Normal color, No rashes or lesions

## 2020-09-18 NOTE — H&P ADULT - HISTORY OF PRESENT ILLNESS
70 year old female PMH HTN CHF COPD valvular heart disease MVR for mitral stenosis, TVR '12 now has severe TR and PHTN   transferred for heart failure optimization and possible mitral intervention

## 2020-09-18 NOTE — H&P ADULT - NSICDXPASTMEDICALHX_GEN_ALL_CORE_FT
- - -
PAST MEDICAL HISTORY:  Atrial fibrillation S/P Ablation    CHF (congestive heart failure)     COPD (chronic obstructive pulmonary disease) on home O2    Gout     HTN (hypertension)     Mitral valve replaced     MIGUEL (obstructive sleep apnea)     Pulmonary hypertension     Tricuspid valve replaced

## 2020-09-18 NOTE — H&P ADULT - NSHPSOCIALHISTORY_GEN_ALL_CORE
lives with souse, daughter  ambulates with walker  uses home O2 x 6 years  neg tobacco  neg alcohol

## 2020-09-19 DIAGNOSIS — N18.3 CHRONIC KIDNEY DISEASE, STAGE 3 (MODERATE): ICD-10-CM

## 2020-09-19 DIAGNOSIS — Z95.2 PRESENCE OF PROSTHETIC HEART VALVE: ICD-10-CM

## 2020-09-19 DIAGNOSIS — I10 ESSENTIAL (PRIMARY) HYPERTENSION: ICD-10-CM

## 2020-09-19 DIAGNOSIS — I50.33 ACUTE ON CHRONIC DIASTOLIC (CONGESTIVE) HEART FAILURE: ICD-10-CM

## 2020-09-19 DIAGNOSIS — I48.91 UNSPECIFIED ATRIAL FIBRILLATION: ICD-10-CM

## 2020-09-19 DIAGNOSIS — Z95.4 PRESENCE OF OTHER HEART-VALVE REPLACEMENT: ICD-10-CM

## 2020-09-19 DIAGNOSIS — I05.0 RHEUMATIC MITRAL STENOSIS: ICD-10-CM

## 2020-09-19 LAB
A1C WITH ESTIMATED AVERAGE GLUCOSE RESULT: 5.8 % — HIGH (ref 4–5.6)
ALBUMIN SERPL ELPH-MCNC: 4.1 G/DL — SIGNIFICANT CHANGE UP (ref 3.3–5)
ALP SERPL-CCNC: 117 U/L — SIGNIFICANT CHANGE UP (ref 40–120)
ALT FLD-CCNC: 9 U/L — LOW (ref 10–45)
ANION GAP SERPL CALC-SCNC: 7 MMOL/L — SIGNIFICANT CHANGE UP (ref 5–17)
APPEARANCE UR: CLEAR — SIGNIFICANT CHANGE UP
AST SERPL-CCNC: 19 U/L — SIGNIFICANT CHANGE UP (ref 10–40)
BILIRUB DIRECT SERPL-MCNC: 0.2 MG/DL — SIGNIFICANT CHANGE UP (ref 0–0.2)
BILIRUB INDIRECT FLD-MCNC: 0.2 MG/DL — SIGNIFICANT CHANGE UP (ref 0.2–1)
BILIRUB SERPL-MCNC: 0.4 MG/DL — SIGNIFICANT CHANGE UP (ref 0.2–1.2)
BILIRUB UR-MCNC: NEGATIVE — SIGNIFICANT CHANGE UP
BUN SERPL-MCNC: 49 MG/DL — HIGH (ref 7–23)
CALCIUM SERPL-MCNC: 10 MG/DL — SIGNIFICANT CHANGE UP (ref 8.4–10.5)
CHLORIDE SERPL-SCNC: 96 MMOL/L — SIGNIFICANT CHANGE UP (ref 96–108)
CO2 SERPL-SCNC: 37 MMOL/L — HIGH (ref 22–31)
COLOR SPEC: SIGNIFICANT CHANGE UP
CREAT SERPL-MCNC: 1.62 MG/DL — HIGH (ref 0.5–1.3)
DIFF PNL FLD: NEGATIVE — SIGNIFICANT CHANGE UP
ESTIMATED AVERAGE GLUCOSE: 120 MG/DL — HIGH (ref 68–114)
FIBRINOGEN PPP-MCNC: 595 MG/DL — HIGH (ref 350–510)
GLUCOSE SERPL-MCNC: 138 MG/DL — HIGH (ref 70–99)
GLUCOSE UR QL: NEGATIVE — SIGNIFICANT CHANGE UP
KETONES UR-MCNC: NEGATIVE — SIGNIFICANT CHANGE UP
LEUKOCYTE ESTERASE UR-ACNC: NEGATIVE — SIGNIFICANT CHANGE UP
MRSA PCR RESULT.: SIGNIFICANT CHANGE UP
NITRITE UR-MCNC: NEGATIVE — SIGNIFICANT CHANGE UP
NT-PROBNP SERPL-SCNC: 648 PG/ML — HIGH (ref 0–300)
PH UR: 6 — SIGNIFICANT CHANGE UP (ref 5–8)
POTASSIUM SERPL-MCNC: 4.1 MMOL/L — SIGNIFICANT CHANGE UP (ref 3.5–5.3)
POTASSIUM SERPL-SCNC: 4.1 MMOL/L — SIGNIFICANT CHANGE UP (ref 3.5–5.3)
PROT SERPL-MCNC: 8 G/DL — SIGNIFICANT CHANGE UP (ref 6–8.3)
PROT UR-MCNC: NEGATIVE — SIGNIFICANT CHANGE UP
S AUREUS DNA NOSE QL NAA+PROBE: SIGNIFICANT CHANGE UP
SARS-COV-2 IGG SERPL QL IA: NEGATIVE — SIGNIFICANT CHANGE UP
SARS-COV-2 IGM SERPL IA-ACNC: 0.09 INDEX — SIGNIFICANT CHANGE UP
SODIUM SERPL-SCNC: 140 MMOL/L — SIGNIFICANT CHANGE UP (ref 135–145)
SP GR SPEC: 1.02 — SIGNIFICANT CHANGE UP (ref 1.01–1.02)
T3 SERPL-MCNC: 77 NG/DL — LOW (ref 80–200)
T4 AB SER-ACNC: 5.4 UG/DL — SIGNIFICANT CHANGE UP (ref 4.6–12)
TSH SERPL-MCNC: 1.92 UIU/ML — SIGNIFICANT CHANGE UP (ref 0.27–4.2)
UROBILINOGEN FLD QL: SIGNIFICANT CHANGE UP

## 2020-09-19 PROCEDURE — 99232 SBSQ HOSP IP/OBS MODERATE 35: CPT

## 2020-09-19 PROCEDURE — 99223 1ST HOSP IP/OBS HIGH 75: CPT

## 2020-09-19 RX ADMIN — Medication 25 MILLIGRAM(S): at 17:02

## 2020-09-19 RX ADMIN — Medication 25 MILLIGRAM(S): at 06:10

## 2020-09-19 RX ADMIN — Medication 40 MILLIGRAM(S): at 06:10

## 2020-09-19 RX ADMIN — LIDOCAINE 1 PATCH: 4 CREAM TOPICAL at 10:23

## 2020-09-19 RX ADMIN — Medication 3 MILLILITER(S): at 11:33

## 2020-09-19 RX ADMIN — Medication 100 MILLIGRAM(S): at 11:33

## 2020-09-19 RX ADMIN — ATORVASTATIN CALCIUM 40 MILLIGRAM(S): 80 TABLET, FILM COATED ORAL at 22:32

## 2020-09-19 RX ADMIN — Medication 3 MILLILITER(S): at 17:02

## 2020-09-19 RX ADMIN — Medication 3 MILLILITER(S): at 06:10

## 2020-09-19 RX ADMIN — PANTOPRAZOLE SODIUM 40 MILLIGRAM(S): 20 TABLET, DELAYED RELEASE ORAL at 06:10

## 2020-09-19 RX ADMIN — Medication 3 MILLILITER(S): at 23:42

## 2020-09-19 RX ADMIN — LIDOCAINE 1 PATCH: 4 CREAM TOPICAL at 22:31

## 2020-09-19 RX ADMIN — Medication 40 MILLIGRAM(S): at 17:02

## 2020-09-19 RX ADMIN — LIDOCAINE 1 PATCH: 4 CREAM TOPICAL at 07:00

## 2020-09-19 NOTE — PROGRESS NOTE ADULT - ASSESSMENT
70 year old female PMH HTN CHF COPD valvular heart disease MVR for mitral stenosis, TVR '12 now has severe TR and PHTN transferred for heart failure optimization   and possible mitral intervention   9/19 Cardiology Consult  Heart failure consult   Continue diuresis

## 2020-09-19 NOTE — CONSULT NOTE ADULT - PROBLEM SELECTOR RECOMMENDATION 4
- evaluation per CTX - evaluation per CTX  - uptitrate beta blocker to achieve HR 60-80 for mitral stenosis - evaluation per CTX  - uptitrate beta blocker to achieve HR 60-70 for mitral stenosis

## 2020-09-19 NOTE — CONSULT NOTE ADULT - ASSESSMENT
70F PMH HFpEF, HTN (diagnosed in 30's and reportedly well controlled with medications), COPD (home 02), AFib on Coumadin (s/p ablation), PHTN, MIGUEL and gout. Her history is also notable for severe mitral stenosis s/p MVR in 1999 (at [facility] with Dr. Laughlin) and severe TR s/p TVR 2012 (at [facility]). Pt now with severe TR and PHTN transferred for heart failure optimization and possible mitral intervention. Cardiology consulted for further management.        Afib  Currently sinus rhythm   Coumadin held, start heparin gtt when INR subtherapeutic     Acute on chronic decompensated heart failure   Appears mildly fluid overloaded   Agree with diuresis as per heart failure     HTN   BP currently at goal (125/83)  Would complete outpatient med [facility]     COPD   Cont oxygen therapy     Alo Scott MD  Cardiology Fellow  [phone]    Please check [email or web address]: [email or web address] for cardiology service schedule and contact information. 70F PMH HFpEF, HTN (diagnosed in 30's and reportedly well controlled with medications), COPD (home 02), AFib on Coumadin (s/p ablation), PHTN, MIGUEL and gout. Her history is also notable for severe mitral stenosis s/p MVR in 1999 (at [facility] with Dr. Laughlin) and severe TR s/p TVR 2012 (at [facility]). Pt now with severe TR and PHTN transferred for heart failure optimization and possible mitral intervention. Cardiology consulted for further management.    Valvular stenosis   Pending ECHO to assess valve structure and function     Afib  Currently sinus rhythm   Cont Metoprolol 25 mg BID   Coumadin held, start heparin gtt when INR subtherapeutic     Acute on chronic decompensated heart failure   Appears mildly fluid overloaded   Agree with diuresis as per heart failure     HTN   BP currently at goal (125/83)  Would complete outpatient med recc     COPD   Cont oxygen therapy     Alo Scott MD  Cardiology Fellow  [phone]    Please check [email or web address]: [email or web address] for cardiology service schedule and contact information.

## 2020-09-19 NOTE — CONSULT NOTE ADULT - SUBJECTIVE AND OBJECTIVE BOX
Patient seen and evaluated at bedside    Chief Complaint: Shortness of breath     HPI:  70 year old woman with HFpEF, HTN (diagnosed in 30's and reportedly well controlled with medications), COPD (home 02), AFib on Coumadin (s/p ablation), PHTN, MIGUEL and gout. Her history is also notable for severe mitral stenosis s/p MVR in  (at [facility] with Dr. Laughlin) and severe TR s/p TVR  (at [facility]). Pt endorses worsening exercise tolerance, SOB, VILLA over the last 2 years, and more recently significant weight gain despite aggressive outpatient attempts at diuresis. She developed worsening SOB accompanied by chest tightness and presented to [facility] and ultimately transferred to [facility] on  for possible mitral intervention. Cardiology consulted for further evaluation. A the time of examination, pt comfortable sitting in chair, requiring supplemental oxygen via nasal cannula. Overall pleasant, denies chest pain.    Cardiologist Dr. Manning    PMHx:   Tricuspid valve replaced  Mitral valve replaced  Gout  HTN (hypertension)  CHF (congestive heart failure)  COPD (chronic obstructive pulmonary disease)  MIGUEL (obstructive sleep apnea)  Pulmonary hypertension  Atrial fibrillation      PSHx:   Tricuspid valve replaced  History of mitral valve replacement with mechanical valve  No significant past surgical history      Allergies:  No Known Allergies      Home Meds: See meds recc    Current Medications:   acetaminophen   Tablet .. 650 milliGRAM(s) Oral every 6 hours PRN  albuterol/ipratropium for Nebulization 3 milliLiter(s) Nebulizer every 6 hours  allopurinol 100 milliGRAM(s) Oral daily  atorvastatin 40 milliGRAM(s) Oral at bedtime  furosemide   Injectable 40 milliGRAM(s) IV Push two times a day  lidocaine   Patch 1 Patch Transdermal every 24 hours  metoprolol tartrate 25 milliGRAM(s) Oral two times a day  pantoprazole    Tablet 40 milliGRAM(s) Oral before breakfast      FAMILY HISTORY:  Family history of hypertension (Mother)  Family history of stroke  Family history of hypertension (Father, Sibling)    Social History:  Denies toxic habits     Review of Systems:  All other review of systems is negative unless indicated above.    Physical Exam:  T(F): 98.4 (-), Max: 98.4 (-)  HR: 84 () (67 - 84)  BP: 125/83 (-) (104/56 - 156/87)  RR: 18 (-)  SpO2: 100% (-)    GENERAL: Obese, no apparent distress   ENT: Neck supple, + JVD, moist mucosa  CHEST/LUNG: Clear to auscultation bilaterally; No wheeze, equal breath sounds bilaterally   HEART: Regular rate and rhythm; No murmurs, rubs, or gallops  ABDOMEN: Soft, Nontender  EXTREMITIES: No edema  PSYCH: Nl behavior, nl affect  NEUROLOGY: AAOx3, non-focal, cranial nerves intact    Cardiovascular Diagnostic Testing:    ECG: Personally reviewed: Sinus    Echo: Personally reviewed:  e< from: TTE Echo Complete w/ Contrast w/ Doppler (20 @ 09:56) >  Procedure:     2D Echo/Doppler/Color Doppler Complete.  Indications:   Heart failure, unspecified - I50.9  Diagnosis:     Heart failure, unspecified - I50.9  Study Details: Technically suboptimal study. Study quality was adversely                 affected due to body habitus.         2D AND M-MODE MEASUREMENTS (normal ranges within parentheses):  Left Ventricle:                  Normal   Aorta/Left Atrium:          Normal  IVSd (2D):              1.42 cm (0.7-1.1) Aortic Root (2D):  3.51 cm (2.4-3.7)  LVPWd (2D):             1.38 cm (0.7-1.1) Left Atrium (2D):  4.36 cm (1.9-4.0)  LVIDd (2D):             4.68 cm (3.4-5.7) Right Ventricle:  LVIDs (2D):             3.28 cm           TAPSE:           1.37 cm  LV FS (2D):             29.9 %   (>25%)  Relative Wall Thickness  0.59    (<0.42)    LV DIASTOLIC FUNCTION:  MV Peak E: 1.49 m/s Decel Time: 224 msec  MV Peak A: 1.16 m/s  E/A Ratio: 1.28    SPECTRAL DOPPLER ANALYSIS (where applicable):  Mitral Valve:  MV Max Blu:   2.99 m/s  MV P1/2 Time: 65.04 msec  MV Mean Grad: 15.8 mmHg MV Area, PHT: 3.38 cm²    Aortic Valve: AoV Max Blu: 2.33 m/s AoV Peak P.7 mmHg AoV Mean P.2 mmHg    LVOT Vmax: 1.42 m/s LVOT VTI: 0.243 m LVOT Diameter: 2.30 cm    AoV Area, Vmax: 2.53 cm² AoV Area, VTI: 2.96 cm² AoV Area, Vmn: 2.65 cm²    Tricuspid Valve and PA/RV Systolic Pressure: TR Max Velocity: 2.84 m/s RA Pressure: 5 mmHg RVSP/PASP: 37.2 mmHg       PHYSICIAN INTERPRETATION:  Left Ventricle: Endocardial visualization was enhanced with intravenous echo contrast. The left ventricular internal cavity size is normal.  Global LV systolic function was normal. Left ventricular ejection fraction, by visual estimation, is 60 to 65%. Spectral Doppler shows pseudonormal pattern of left ventricular myocardial filling (Grade II diastolic dysfunction). Elevated left atrial and left ventricular end-diastolic pressures.  Right Ventricle: Normal right ventricular size and function.  Left Atrium: Mild to moderately enlarged left atrium.  Right Atrium: Mildly enlarged right atrium.  Pericardium: There is no evidence of pericardial effusion.  Mitral Valve: The mitral valve is not well seen. There is mild mitral annular calcification. Echo and/or Doppler findings are consistent with elevated gradients noted. the mitral prosthesis. Peak transmitral mean gradient equals 15.8 mmHg, calculated mitral valve area by pressure half time equals 3.38 cm² consistent with No evidence of mitral stenosis. Mild mitral valve regurgitation is seen.  Tricuspid Valve: The tricuspid valve is not well seen. Mild tricuspid regurgitation is visualized. Estimated pulmonary artery systolic pressure is 37.2 mmHg assuming a right atrial pressure of 5 mmHg, which is consistent with borderline pulmonary hypertension.  Aortic Valve: The aortic valve was not well visualized. Peak transaortic gradient equals 21.7 mmHg, mean transaortic gradient equals 9.2 mmHg, the calculated aortic valve area equals 2.96 cm² by the continuity equation consistent with normally opening aortic valve. No evidence of aortic valve regurgitation is seen.  Pulmonic Valve: The pulmonic valve was not well visualized.  Aorta: Aortic root measured at Sinus of Valsalva is normal.  Venous: The inferior vena cava was normal sized, with respiratory size variation greater than 50%.       Summary:   1. Left ventricular ejection fraction, by visual estimation, is 60 to 65%.   2. Technically suboptimal study.   3. Normal global left ventricular systolic function.   4. Normal left ventricular internal cavity size.   5. Spectral Doppler shows pseudonormal pattern of left ventricular myocardial filling (Grade II diastolic dysfunction).   6. There is mild concentric left ventricular hypertrophy.   7. Normal right ventricular size and function.   8. There is no evidence of pericardial effusion.   9. Mild mitral annular calcification.  10.Mild mitral valve regurgitation.  11. Mitral prosthesis with elevated gradients noted.  12. Estimated pulmonary artery systolic pressure is 37.2 mmHg assuming a right atrial pressure of 5 mmHg, which is consistent with borderline pulmonary hypertension.  13. Endocardial visualization was enhanced with intravenous echo contrast.  14. Elevated left atrial and left ventricular end-diastolic pressures.    Montana Barreto MD FACC, FASRUBIN, FACP  Electronically signed on 2020 at 10:30:57 PM              < end of copied text >      Imaging:    CXR: Personally reviewed    Labs: Personally reviewed                        9.5    8.07  )-----------( 346      ( 18 Sep 2020 19:53 )             33.1         140  |  96  |  49<H>  ----------------------------<  138<H>  4.1   |  37<H>  |  1.62<H>    Ca    10.0      19 Sep 2020 06:38    TPro  8.0  /  Alb  4.1  /  TBili  0.4  /  DBili  0.2  /  AST  19  /  ALT  9<L>  /  AlkPhos  117      PT/INR - ( 18 Sep 2020 19:53 )   PT: 27.6 sec;   INR: 2.42 ratio         PTT - ( 18 Sep 2020 19:53 )  PTT:47.9 sec    Serum Pro-Brain Natriuretic Peptide: 648 pg/mL ( @ 06:38)  Thyroid Stimulating Hormone, Serum: 1.92 uIU/mL ( @ 10:52)

## 2020-09-19 NOTE — CONSULT NOTE ADULT - ATTENDING COMMENTS
Appears mildly overloaded. Will continue IV diuresis for now. Obtain TTE to reassess valves and non-invasive hemodynamics. Appears mildly overloaded. Will continue IV diuresis for now. Obtain TTE to reassess valves and non-invasive hemodynamics. Uptitrate metoprolol for goal HR 60-80 in setting of mitral stenosis Appears mildly overloaded. Will continue IV diuresis for now. Obtain TTE to reassess valves and non-invasive hemodynamics. Uptitrate metoprolol for goal HR 60-70 in setting of mitral stenosis

## 2020-09-19 NOTE — PROGRESS NOTE ADULT - SUBJECTIVE AND OBJECTIVE BOX
Subjective:  "Im ok what is the surgery planned for ?"  OOB chair    Tele:      SR                            T(C): 36.5 (09-19-20 @ 05:52), Max: 36.7 (09-18-20 @ 20:50)  HR: 75 (09-19-20 @ 05:52) (75 - 83)  BP: 104/56 (09-19-20 @ 05:52) (104/56 - 156/87)  RR: 16 (09-19-20 @ 05:52) (16 - 18)  SpO2: 98% (09-19-20 @ 05:52) (98% - 99%)    LVEF:       09-19    140  |  96  |  49<H>  ----------------------------<  138<H>  4.1   |  37<H>  |  1.62<H>    Ca    10.0      19 Sep 2020 06:38    TPro  8.0  /  Alb  4.1  /  TBili  0.4  /  DBili  0.2  /  AST  19  /  ALT  9<L>  /  AlkPhos  117  09-19                               9.5    8.07  )-----------( 346      ( 18 Sep 2020 19:53 )             33.1        PT/INR - ( 18 Sep 2020 19:53 )   PT: 27.6 sec;   INR: 2.42 ratio         PTT - ( 18 Sep 2020 19:53 )  PTT:47.9 sec             CXR: < from: Xray Chest 1 View- PORTABLE-Urgent (Xray Chest 1 View- PORTABLE-Urgent .) (09.18.20 @ 21:07) >  Sternotomy wires are noted.    There is mild pulmonary vascular congestion.  There is no focal airspace consolidation.  There are no pleural effusions.    The mediastinal contour is markedly enlarged.  The trachea is midline.    The osseous structures are intact.    < end of copied text >        Assessment    Neuro: aleret, no deficits    Pulm: Supplemental O2 in use Respirations sl labored with activity    CV:  S1 S2  RRR    Abd: obese, soft non tender    Extremities: trace edema B/l lower extremities      MEDICATIONS  (STANDING):  albuterol/ipratropium for Nebulization 3 milliLiter(s) Nebulizer every 6 hours  allopurinol 100 milliGRAM(s) Oral daily  atorvastatin 40 milliGRAM(s) Oral at bedtime  furosemide   Injectable 40 milliGRAM(s) IV Push two times a day  lidocaine   Patch 1 Patch Transdermal every 24 hours  metoprolol tartrate 25 milliGRAM(s) Oral two times a day  pantoprazole    Tablet 40 milliGRAM(s) Oral before breakfast       PAST MEDICAL & SURGICAL HISTORY:  Tricuspid valve replaced    Mitral valve replaced    Gout    HTN (hypertension)    CHF (congestive heart failure)    COPD (chronic obstructive pulmonary disease)  on home O2    MIGUEL (obstructive sleep apnea)    Pulmonary hypertension    Atrial fibrillation  S/P Ablation    Tricuspid valve replaced  mechanical    History of mitral valve replacement with mechanical valve

## 2020-09-19 NOTE — CONSULT NOTE ADULT - PROBLEM SELECTOR RECOMMENDATION 9
- continue diuresis with Lasix 40 mg IV BID, will assess response and adjust accordingly  - management of HTN as below  - daily standing weights and strict I&Os

## 2020-09-19 NOTE — PROGRESS NOTE ADULT - SUBJECTIVE AND OBJECTIVE BOX
SUBJECTIVE / OVERNIGHT EVENTS: pt denies chest pain, shortness of breath       MEDICATIONS  (STANDING):  albuterol/ipratropium for Nebulization 3 milliLiter(s) Nebulizer every 6 hours  allopurinol 100 milliGRAM(s) Oral daily  atorvastatin 40 milliGRAM(s) Oral at bedtime  furosemide   Injectable 40 milliGRAM(s) IV Push two times a day  lidocaine   Patch 1 Patch Transdermal every 24 hours  metoprolol tartrate 25 milliGRAM(s) Oral two times a day  pantoprazole    Tablet 40 milliGRAM(s) Oral before breakfast    MEDICATIONS  (PRN):  acetaminophen   Tablet .. 650 milliGRAM(s) Oral every 6 hours PRN Temp greater or equal to 38.5C (101.3F), Mild Pain (1 - 3)        CAPILLARY BLOOD GLUCOSE    Vital Signs Last 24 Hrs  T(C): 36.8 (19 Sep 2020 19:54), Max: 36.9 (19 Sep 2020 12:54)  T(F): 98.2 (19 Sep 2020 19:54), Max: 98.4 (19 Sep 2020 12:54)  HR: 59 (19 Sep 2020 19:54) (59 - 84)  BP: 118/63 (19 Sep 2020 19:54) (104/56 - 125/83)  BP(mean): --  RR: 18 (19 Sep 2020 19:54) (16 - 18)  SpO2: 99% (19 Sep 2020 19:54) (98% - 100%)    I&O's Summary    18 Sep 2020 07:01  -  19 Sep 2020 07:00  --------------------------------------------------------  IN: 640 mL / OUT: 150 mL / NET: 490 mL    19 Sep 2020 07:  -  19 Sep 2020 21:34  --------------------------------------------------------  IN: 1000 mL / OUT: 700 mL / NET: 300 mL        Constitutional: No fever, fatigue  Skin: No rash.  Eyes: No recent vision problems or eye pain.  ENT: No congestion, ear pain, or sore throat.  Cardiovascular: No chest pain or palpation.  Respiratory: No cough, shortness of breath, congestion, or wheezing.  Gastrointestinal: No abdominal pain, nausea, vomiting, or diarrhea.  Genitourinary: No dysuria.  Musculoskeletal: No joint swelling.  Neurologic: No headache.    PHYSICAL EXAM:  GENERAL: NAD  EYES: EOMI, PERRLA  NECK: Supple, No JVD  CHEST/LUNG: crackles at bases  HEART:  S1 , S2 +  ABDOMEN: soft , bs+  EXTREMITIES:  edema+  NEUROLOGY:alert awake oriented       LABS:                        9.5    8.07  )-----------( 346      ( 18 Sep 2020 19:53 )             33.1         140  |  96  |  49<H>  ----------------------------<  138<H>  4.1   |  37<H>  |  1.62<H>    Ca    10.0      19 Sep 2020 06:38    TPro  8.0  /  Alb  4.1  /  TBili  0.4  /  DBili  0.2  /  AST  19  /  ALT  9<L>  /  AlkPhos  117  -19    PT/INR - ( 18 Sep 2020 19:53 )   PT: 27.6 sec;   INR: 2.42 ratio         PTT - ( 18 Sep 2020 19:53 )  PTT:47.9 sec      Urinalysis Basic - ( 19 Sep 2020 04:15 )    Color: Light Yellow / Appearance: Clear / S.016 / pH: x  Gluc: x / Ketone: Negative  / Bili: Negative / Urobili: <2 mg/dL   Blood: x / Protein: Negative / Nitrite: Negative   Leuk Esterase: Negative / RBC: x / WBC x   Sq Epi: x / Non Sq Epi: x / Bacteria: x        RADIOLOGY & ADDITIONAL TESTS:    Imaging Personally Reviewed:    Consultant(s) Notes Reviewed:      Care Discussed with Consultants/Other Providers:

## 2020-09-19 NOTE — CONSULT NOTE ADULT - ASSESSMENT
Ms. Alcantara is a sandra 70 year old woman with HFpEF, HTN (diagnosed in 30's and reportedly well controlled with medications), COPD (home 02), AFib on Coumadin (s/p ablation), PHTN, CKD 3 (b/l SCr ~1.3-1.6), MIGUEL and gout. Her history is also notable for severe mitral stenosis s/p MVR in 1999 (at [facility] with Dr. Laughlin) and severe TR s/p TVR 2012 (at [facility]). She tells me that following her valvular surgeries she felt improved and was doing well until 2 years ago, when she noted worsening SOB, orthopnea, PND, reduced exertional tolerance (estimates 1/2 block) and fatigue. She has been following with her cardiologist Dr. Manning and Lasix had been increased to 80 mg BID. She does not weigh herself daily but recalls weight had increased from 245 lbs to 150 lbs one month prior. She developed worsening SOB accompanied by chest tightness and presented to [facility] and ultimately transferred to [facility] on 9/18 for possible mitral intervention. HF consulted by Dr. Fernandez for further optimization. Of note, had two hospitalization this past year for anemia requiring transfusions and received occasional doses of IV diuretics but without overt ADHF. Review of TTE from 6/2020 notable for grade II diastolic dysfunction, SÁNCHEZ, nl RVS&F, mild MR (mean gradient 15.8 mmHg), mild TR, estimated PASP 37.2.     Currently, she is hemodynamically stable but appears volume expanded on exam with JVD at least moderately elevated. Mildly elevated pro-BNP of 648 but not significantly elevated as compared to her recent admission in June. Her degree of renal dysfunction appears to be near her baseline. She notes ongoing orthopnea and VILLA but denies CP, palpitations, ABD distention and LE swelling. Will continue to monitor response with intermittent IV diuretics and repeat TTE to evaluate cardiac function

## 2020-09-19 NOTE — CONSULT NOTE ADULT - SUBJECTIVE AND OBJECTIVE BOX
HPI:  Ms. Alcantara is a sandra 70 year old woman with HFpEF, HTN (diagnosed in 30's and reportedly well controlled with medications), COPD (home 02), AFib on Coumadin (s/p ablation), PHTN, MIGUEL and gout. Her history is also notable for severe mitral stenosis s/p MVR in  (at [facility] with Dr. Laughlin) and severe TR s/p TVR  (at [facility]). She tells me that following her valvular surgeries she felt improved and was doing well until 2 years ago, when she noted worsening SOB, orthopnea, PND, reduced exertional tolerance (estimates 1/2 block) and fatigue. She has been following with her cardiologist Dr. Manning and Lasix had been increased to 80 mg BID. She does not weigh herself daily but recalls weight had increased from 245 lbs to 150 lbs one month prior. She developed worsening SOB accompanied by chest tightness and presented to [facility] and ultimately transferred to [facility] on  for possible mitral intervention. HF consulted by Dr. Fernandez for further optimization. Of note, had two hospitalization this past year for anemia requiring transfusions and received occasional doses of IV diuretics but without overt ADHF. Review of TTE from 2020 notable for grade II diastolic dysfunction, SÁNCHEZ, nl RVS&F, mild MR (mean gradient 15.8 mmHg), mild TR, estimated PASP 37.2.     Currently, she is hemodynamically stable but appears volume expanded on exam with JVD at least moderately elevated. Mildly elevated pro-BNP of 648 but not significantly elevated as compared to her recent admission in . She notes ongoing orthopnea and VILLA but denies CP, palpitations, ABD distention and LE swelling. Will continue to monitor response with intermittent IV diuretics and repeat TTE     HOME MEDICATIONS  Lasix 80 mg BID  Toprol XL 25mg QD  Ramapril 5 mg QD  Digoxin 125 mcg QD  KCL 20 meq QD  Coumadin  Prednisone taper       PAST MEDICAL & SURGICAL HISTORY:  Tricuspid valve replaced    Mitral valve replaced    Gout    HTN (hypertension)    CHF (congestive heart failure)    COPD (chronic obstructive pulmonary disease)  on home O2    MIGUEL (obstructive sleep apnea)    Pulmonary hypertension    Atrial fibrillation  S/P Ablation    Tricuspid valve replaced  mechanical    History of mitral valve replacement with mechanical valve      REVIEW OF SYSTEMS  The remaining 14 point ROS is otherwise negative or non-contributory except as noted in HPI    MEDICATIONS  (STANDING):  albuterol/ipratropium for Nebulization 3 milliLiter(s) Nebulizer every 6 hours  allopurinol 100 milliGRAM(s) Oral daily  atorvastatin 40 milliGRAM(s) Oral at bedtime  furosemide   Injectable 40 milliGRAM(s) IV Push two times a day  lidocaine   Patch 1 Patch Transdermal every 24 hours  metoprolol tartrate 25 milliGRAM(s) Oral two times a day  pantoprazole    Tablet 40 milliGRAM(s) Oral before breakfast    MEDICATIONS  (PRN):  acetaminophen   Tablet .. 650 milliGRAM(s) Oral every 6 hours PRN Temp greater or equal to 38.5C (101.3F), Mild Pain (1 - 3)      Allergies    No Known Allergies    Intolerances        SOCIAL HISTORY:  Retired OR Nurse    Resides with spouse, daughters and grandchildren  Never a smoker  Does not drink ETOH  Denies toxic habits     FAMILY HISTORY:  Family history of hypertension (Mother)  Family history of stroke  Family history of hypertension (Father, Sibling)        Vital Signs Last 24 Hrs  T(C): 36.9 (19 Sep 2020 12:54), Max: 36.9 (19 Sep 2020 12:54)  T(F): 98.4 (19 Sep 2020 12:54), Max: 98.4 (19 Sep 2020 12:54)  HR: 67 (19 Sep 2020 12:54) (67 - 83)  BP: 120/69 (19 Sep 2020 12:54) (104/56 - 156/87)  BP(mean): --  RR: 18 (19 Sep 2020 12:54) (16 - 18)  SpO2: 100% (19 Sep 2020 12:54) (98% - 100%)    PHYSICAL EXAM:    Tele: Junctional, HR 70-80s, PVCs    General: NAD. Comfortable  HEENT: EOM intact  Neck: JVD at least moderately elevated, positive HJR  Cardiac: RRR. S1, S2, mechanical click. No peripheral edema  Chest: Clear to auscultation bilaterally  ABD: Obese. Soft, non-tender  Extremities: Warm peripherally  Neuro: A&Ox4, non-focal  Psych: Appropriate mood and affect      LABS:                        9.5    8.07  )-----------( 346      ( 18 Sep 2020 19:53 )             33.1     09-19    140  |  96  |  49<H>  ----------------------------<  138<H>  4.1   |  37<H>  |  1.62<H>    Ca    10.0      19 Sep 2020 06:38    TPro  8.0  /  Alb  4.1  /  TBili  0.4  /  DBili  0.2  /  AST  19  /  ALT  9<L>  /  AlkPhos  117  09-19    PT/INR - ( 18 Sep 2020 19:53 )   PT: 27.6 sec;   INR: 2.42 ratio         PTT - ( 18 Sep 2020 19:53 )  PTT:47.9 sec  Urinalysis Basic - ( 19 Sep 2020 04:15 )    Color: Light Yellow / Appearance: Clear / S.016 / pH: x  Gluc: x / Ketone: Negative  / Bili: Negative / Urobili: <2 mg/dL   Blood: x / Protein: Negative / Nitrite: Negative   Leuk Esterase: Negative / RBC: x / WBC x   Sq Epi: x / Non Sq Epi: x / Bacteria: x        RADIOLOGY & ADDITIONAL STUDIES:    < from: TTE Echo Complete w/ Contrast w/ Doppler (20 @ 09:56) >  2D AND M-MODE MEASUREMENTS (normal ranges within parentheses):  Left Ventricle:                  Normal   Aorta/Left Atrium:          Normal  IVSd (2D):              1.42 cm (0.7-1.1) Aortic Root (2D):  3.51 cm (2.4-3.7)  LVPWd (2D):             1.38 cm (0.7-1.1) Left Atrium (2D):  4.36 cm (1.9-4.0)  LVIDd (2D):             4.68 cm (3.4-5.7) Right Ventricle:  LVIDs (2D):             3.28 cm           TAPSE:           1.37 cm  LV FS (2D):             29.9 %   (>25%)  Relative Wall Thickness  0.59    (<0.42)    LV DIASTOLIC FUNCTION:  MV Peak E: 1.49 m/s Decel Time: 224 msec  MV Peak A: 1.16 m/s  E/A Ratio: 1.28    SPECTRAL DOPPLER ANALYSIS (where applicable):  Mitral Valve:  MV Max Blu:   2.99 m/s  MV P1/2 Time: 65.04 msec  MV Mean Grad: 15.8 mmHg MV Area, PHT: 3.38 cm²    Aortic Valve: AoV Max Blu: 2.33 m/s AoV Peak P.7 mmHg AoV Mean P.2 mmHg    LVOT Vmax: 1.42 m/s LVOT VTI: 0.243 m LVOT Diameter: 2.30 cm    AoV Area, Vmax: 2.53 cm² AoV Area, VTI: 2.96 cm² AoV Area, Vmn: 2.65 cm²    Tricuspid Valve and PA/RV Systolic Pressure: TR Max Velocity: 2.84 m/s RA Pressure: 5 mmHg RVSP/PASP: 37.2 mmHg       PHYSICIAN INTERPRETATION:  Left Ventricle: Endocardial visualization was enhanced with intravenous echo contrast. The left ventricular internal cavity size is normal.  Global LV systolic function was normal. Left ventricular ejection fraction, by visual estimation, is 60 to 65%. Spectral Doppler shows pseudonormal pattern of left ventricular myocardial filling (Grade II diastolic dysfunction). Elevated left atrial and left ventricular end-diastolic pressures.  Right Ventricle: Normal right ventricular size and function.  Left Atrium: Mild to moderately enlarged left atrium.  Right Atrium: Mildly enlarged right atrium.  Pericardium: There is no evidence of pericardial effusion.  Mitral Valve: The mitral valve is not well seen. There is mild mitral annular calcification. Echo and/or Doppler findings are consistent with elevated gradients noted. the mitral prosthesis. Peak transmitral mean gradient equals 15.8 mmHg, calculated mitral valve area by pressure half time equals 3.38 cm² consistent with No evidence of mitral stenosis. Mild mitral valve regurgitation is seen.  Tricuspid Valve: The tricuspid valve is not well seen. Mild tricuspid regurgitation is visualized. Estimated pulmonary artery systolic pressure is 37.2 mmHg assuming a right atrial pressure of 5 mmHg, which is consistent with borderline pulmonary hypertension.  Aortic Valve: The aortic valve was not well visualized. Peak transaortic gradient equals 21.7 mmHg, mean transaortic gradient equals 9.2 mmHg, the calculated aortic valve area equals 2.96 cm² by the continuity equation consistent with normally opening aortic valve. No evidence of aortic valve regurgitation is seen.  Pulmonic Valve: The pulmonic valve was not well visualized.  Aorta: Aortic root measured at Sinus of Valsalva is normal.  Venous: The inferior vena cava was normal sized, with respiratory size variation greater than 50%.       Summary:   1. Left ventricular ejection fraction, by visual estimation, is 60 to 65%.   2. Technically suboptimal study.   3. Normal global left ventricular systolic function.   4. Normal left ventricular internal cavity size.   5. Spectral Doppler shows pseudonormal pattern of left ventricular myocardial filling (Grade II diastolic dysfunction).   6. There is mild concentric left ventricular hypertrophy.   7. Normal right ventricular size and function.   8. There is no evidence of pericardial effusion.   9. Mild mitral annular calcification.  10.Mild mitral valve regurgitation.  11. Mitral prosthesis with elevated gradients noted.  12. Estimated pulmonary artery systolic pressure is 37.2 mmHg assuming a right atrial pressure of 5 mmHg, which is consistent with borderline pulmonary hypertension.  13. Endocardial visualization was enhanced with intravenous echo contrast.  14. Elevated left atrial and left ventricular end-diastolic pressures.    < end of copied text >

## 2020-09-20 LAB
ALBUMIN SERPL ELPH-MCNC: 4.1 G/DL — SIGNIFICANT CHANGE UP (ref 3.3–5)
ALP SERPL-CCNC: 113 U/L — SIGNIFICANT CHANGE UP (ref 40–120)
ALT FLD-CCNC: 8 U/L — LOW (ref 10–45)
ANION GAP SERPL CALC-SCNC: 9 MMOL/L — SIGNIFICANT CHANGE UP (ref 5–17)
AST SERPL-CCNC: 19 U/L — SIGNIFICANT CHANGE UP (ref 10–40)
BASE EXCESS BLDA CALC-SCNC: 9.8 MMOL/L — HIGH (ref -2–2)
BILIRUB SERPL-MCNC: 0.4 MG/DL — SIGNIFICANT CHANGE UP (ref 0.2–1.2)
BUN SERPL-MCNC: 57 MG/DL — HIGH (ref 7–23)
CALCIUM SERPL-MCNC: 9.9 MG/DL — SIGNIFICANT CHANGE UP (ref 8.4–10.5)
CHLORIDE SERPL-SCNC: 94 MMOL/L — LOW (ref 96–108)
CO2 BLDA-SCNC: 38 MMOL/L — HIGH (ref 22–30)
CO2 SERPL-SCNC: 33 MMOL/L — HIGH (ref 22–31)
CREAT SERPL-MCNC: 2.03 MG/DL — HIGH (ref 0.5–1.3)
GAS PNL BLDA: SIGNIFICANT CHANGE UP
GLUCOSE SERPL-MCNC: 114 MG/DL — HIGH (ref 70–99)
HCO3 BLDA-SCNC: 36 MMOL/L — HIGH (ref 21–29)
HCT VFR BLD CALC: 32.3 % — LOW (ref 34.5–45)
HGB BLD-MCNC: 9.1 G/DL — LOW (ref 11.5–15.5)
INR BLD: 2.39 RATIO — HIGH (ref 0.88–1.16)
MCHC RBC-ENTMCNC: 25.5 PG — LOW (ref 27–34)
MCHC RBC-ENTMCNC: 28.2 GM/DL — LOW (ref 32–36)
MCV RBC AUTO: 90.5 FL — SIGNIFICANT CHANGE UP (ref 80–100)
NRBC # BLD: 0 /100 WBCS — SIGNIFICANT CHANGE UP (ref 0–0)
PCO2 BLDA: 62 MMHG — HIGH (ref 32–46)
PH BLDA: 7.38 — SIGNIFICANT CHANGE UP (ref 7.35–7.45)
PLATELET # BLD AUTO: 317 K/UL — SIGNIFICANT CHANGE UP (ref 150–400)
PO2 BLDA: 44 MMHG — CRITICAL LOW (ref 74–108)
POTASSIUM SERPL-MCNC: 4.5 MMOL/L — SIGNIFICANT CHANGE UP (ref 3.5–5.3)
POTASSIUM SERPL-SCNC: 4.5 MMOL/L — SIGNIFICANT CHANGE UP (ref 3.5–5.3)
PROT SERPL-MCNC: 8 G/DL — SIGNIFICANT CHANGE UP (ref 6–8.3)
PROTHROM AB SERPL-ACNC: 27.3 SEC — HIGH (ref 10.6–13.6)
RBC # BLD: 3.57 M/UL — LOW (ref 3.8–5.2)
RBC # FLD: 18.4 % — HIGH (ref 10.3–14.5)
SAO2 % BLDA: 75 % — LOW (ref 92–96)
SODIUM SERPL-SCNC: 136 MMOL/L — SIGNIFICANT CHANGE UP (ref 135–145)
WBC # BLD: 7.8 K/UL — SIGNIFICANT CHANGE UP (ref 3.8–10.5)
WBC # FLD AUTO: 7.8 K/UL — SIGNIFICANT CHANGE UP (ref 3.8–10.5)

## 2020-09-20 PROCEDURE — 99223 1ST HOSP IP/OBS HIGH 75: CPT

## 2020-09-20 PROCEDURE — 99233 SBSQ HOSP IP/OBS HIGH 50: CPT

## 2020-09-20 PROCEDURE — 99232 SBSQ HOSP IP/OBS MODERATE 35: CPT

## 2020-09-20 RX ADMIN — Medication 25 MILLIGRAM(S): at 05:53

## 2020-09-20 RX ADMIN — LIDOCAINE 1 PATCH: 4 CREAM TOPICAL at 23:04

## 2020-09-20 RX ADMIN — Medication 25 MILLIGRAM(S): at 17:04

## 2020-09-20 RX ADMIN — PANTOPRAZOLE SODIUM 40 MILLIGRAM(S): 20 TABLET, DELAYED RELEASE ORAL at 05:53

## 2020-09-20 RX ADMIN — Medication 3 MILLILITER(S): at 05:52

## 2020-09-20 RX ADMIN — Medication 3 MILLILITER(S): at 17:04

## 2020-09-20 RX ADMIN — ATORVASTATIN CALCIUM 40 MILLIGRAM(S): 80 TABLET, FILM COATED ORAL at 23:04

## 2020-09-20 RX ADMIN — Medication 3 MILLILITER(S): at 11:45

## 2020-09-20 RX ADMIN — LIDOCAINE 1 PATCH: 4 CREAM TOPICAL at 11:47

## 2020-09-20 RX ADMIN — Medication 3 MILLILITER(S): at 23:03

## 2020-09-20 RX ADMIN — Medication 100 MILLIGRAM(S): at 11:44

## 2020-09-20 RX ADMIN — Medication 40 MILLIGRAM(S): at 05:52

## 2020-09-20 NOTE — PHYSICAL THERAPY INITIAL EVALUATION ADULT - PERTINENT HX OF CURRENT PROBLEM, REHAB EVAL
70 y.o. F PMH HFpEF, HTN, COPD (home 02), AFib (s/p ablation), PHTN, MIGUEL & gout. Severe mitral stenosis s/p MVR in 1999 & severe TR s/p TVR 2012. 2 years ago, She noted worsening SOB, orthopnea, PND, reduced exertional tolerance & fatigue. Developed worsening SOB accompanied by chest tightness & presented to [facility], ultimately transferred to [facility] on 9/18 for possible mitral intervention.

## 2020-09-20 NOTE — PROGRESS NOTE ADULT - SUBJECTIVE AND OBJECTIVE BOX
Subjective:  "Im ok, just a little winded"  PT eval today  Walked 100 ft w/ RW  on O2    Tele:  SR  60-70                              T(C): 36 (09-20-20 @ 13:26), Max: 36.8 (09-19-20 @ 19:54)  HR: 65 (09-20-20 @ 13:26) (59 - 84)  BP: 119/64 (09-20-20 @ 13:26) (117/70 - 136/73)  RR: 18 (09-20-20 @ 13:26) (18 - 18)  SpO2: 99% (09-20-20 @ 13:26) (93% - 99%)        09-20    136  |  94<L>  |  57<H>  ----------------------------<  114<H>  4.5   |  33<H>  |  2.03<H>    Ca    9.9      20 Sep 2020 07:08    TPro  8.0  /  Alb  4.1  /  TBili  0.4  /  DBili  x   /  AST  19  /  ALT  8<L>  /  AlkPhos  113  09-20                               9.1    7.80  )-----------( 317      ( 20 Sep 2020 07:11 )             32.3        PT/INR - ( 20 Sep 2020 07:15 )   PT: 27.3 sec;   INR: 2.39 ratio         PTT - ( 18 Sep 2020 19:53 )  PTT:47.9 sec    ABG Blood Gas Profile - Arterial (09.20.20 @ 14:54)  RA    pH, Arterial: 7.38    pCO2, Arterial: 62 mmHg    pO2, Arterial: 44: TYPE:(C=Critical, N=Notification, A=Abnormal) C    HCO3, Arterial: 36 mmoL/L    Base Excess, Arterial: 9.8 mmol/L    Oxygen Saturation, Arterial: 75 %    Total CO2, Arterial: 38 mmoL/L    Blood Gas Source Arterial: Arterial      Assessment      Neuro: alert no deficits    Pulm: Supplemental O2 in use Respirations sl labored with activity    CV:  S1 S2  RRR    Abd: obese, soft non tender    Extremities: trace edema B/l lower extremities    ENT  R nare epistaxis    MEDICATIONS  (STANDING):  albuterol/ipratropium for Nebulization 3 milliLiter(s) Nebulizer every 6 hours  allopurinol 100 milliGRAM(s) Oral daily  atorvastatin 40 milliGRAM(s) Oral at bedtime  furosemide   Injectable 40 milliGRAM(s) IV Push two times a day  lidocaine   Patch 1 Patch Transdermal every 24 hours  metoprolol tartrate 25 milliGRAM(s) Oral two times a day  pantoprazole    Tablet 40 milliGRAM(s) Oral before breakfast       PAST MEDICAL & SURGICAL HISTORY:  Tricuspid valve replaced    Mitral valve replaced    Gout    HTN (hypertension)    CHF (congestive heart failure)    COPD (chronic obstructive pulmonary disease)  on home O2    MIGUEL (obstructive sleep apnea)    Pulmonary hypertension    Atrial fibrillation  S/P Ablation    Tricuspid valve replaced  mechanical    History of mitral valve replacement with mechanical valve

## 2020-09-20 NOTE — PROGRESS NOTE ADULT - SUBJECTIVE AND OBJECTIVE BOX
SUBJECTIVE / OVERNIGHT EVENTS: pt denies chest pain, shortness of breath     MEDICATIONS  (STANDING):  albuterol/ipratropium for Nebulization 3 milliLiter(s) Nebulizer every 6 hours  allopurinol 100 milliGRAM(s) Oral daily  atorvastatin 40 milliGRAM(s) Oral at bedtime  lidocaine   Patch 1 Patch Transdermal every 24 hours  metoprolol tartrate 25 milliGRAM(s) Oral two times a day  pantoprazole    Tablet 40 milliGRAM(s) Oral before breakfast    MEDICATIONS  (PRN):  acetaminophen   Tablet .. 650 milliGRAM(s) Oral every 6 hours PRN Temp greater or equal to 38.5C (101.3F), Mild Pain (1 - 3)    Vital Signs Last 24 Hrs  T(C): 36 (20 Sep 2020 13:26), Max: 36.6 (20 Sep 2020 05:13)  T(F): 96.8 (20 Sep 2020 13:26), Max: 97.8 (20 Sep 2020 05:13)  HR: 65 (20 Sep 2020 13:) (64 - 67)  BP: 119/64 (20 Sep 2020 13:) (117/70 - 136/73)  BP(mean): --  RR: 18 (20 Sep 2020 13:26) (18 - 18)  SpO2: 99% (20 Sep 2020 13:26) (93% - 99%)    Eyes: No recent vision problems or eye pain.  ENT: No congestion, ear pain, or sore throat.  Cardiovascular: No chest pain or palpation.  Respiratory: No cough, shortness of breath, congestion, or wheezing.  Gastrointestinal: No abdominal pain, nausea, vomiting, or diarrhea.  Genitourinary: No dysuria.  Musculoskeletal: No joint swelling.  Neurologic: No headache.    PHYSICAL EXAM:  GENERAL: NAD  EYES: EOMI, PERRLA  NECK: Supple, No JVD  CHEST/LUNG: crackles at bases  HEART:  S1 , S2 +  ABDOMEN: soft , bs+  EXTREMITIES:  edema+  NEUROLOGY:alert awake oriented     LABS:      136  |  94<L>  |  57<H>  ----------------------------<  114<H>  4.5   |  33<H>  |  2.03<H>    Ca    9.9      20 Sep 2020 07:08    TPro  8.0  /  Alb  4.1  /  TBili  0.4  /  DBili      /  AST  19  /  ALT  8<L>  /  AlkPhos  113  09-20    Creatinine Trend: 2.03 <--, 1.62 <--, 1.43 <--                        9.1    7.80  )-----------( 317      ( 20 Sep 2020 07:11 )             32.3     Urine Studies:  Urinalysis Basic - ( 19 Sep 2020 04:15 )    Color: Light Yellow / Appearance: Clear / S.016 / pH:   Gluc:  / Ketone: Negative  / Bili: Negative / Urobili: <2 mg/dL   Blood:  / Protein: Negative / Nitrite: Negative   Leuk Esterase: Negative / RBC:  / WBC    Sq Epi:  / Non Sq Epi:  / Bacteria:             ABG - ( 20 Sep 2020 14:54 )  pH, Arterial: 7.38  pH, Blood: x     /  pCO2: 62    /  pO2: 44    / HCO3: 36    / Base Excess: 9.8   /  SaO2: 75                LIVER FUNCTIONS - ( 20 Sep 2020 07:08 )  Alb: 4.1 g/dL / Pro: 8.0 g/dL / ALK PHOS: 113 U/L / ALT: 8 U/L / AST: 19 U/L / GGT: x           PT/INR - ( 20 Sep 2020 07:15 )   PT: 27.3 sec;   INR: 2.39 ratio             Consultant(s) Notes Reviewed:      Care Discussed with Consultants/Other Providers:

## 2020-09-20 NOTE — PHYSICAL THERAPY INITIAL EVALUATION ADULT - PLANNED THERAPY INTERVENTIONS, PT EVAL
transfer training/bed mobility training/1. GOAL: In 3 weeks, pt will be able to navigate 4 steps independently./gait training

## 2020-09-20 NOTE — PROGRESS NOTE ADULT - ASSESSMENT
Ms. Alcantara is a sandra 70 year old woman with HFpEF, HTN (diagnosed in 30's and reportedly well controlled with medications), COPD (home 02), AFib on Coumadin (s/p ablation), PHTN, CKD 3 (b/l SCr ~1.3-1.6), MIGUEL and gout. Her history is also notable for severe mitral stenosis s/p MVR in 1999 (at [facility] with Dr. Laughlin) and severe TR s/p TVR 2012 (at [facility]) with reported residual MS and TR who initially presented to [facility] for SOB and chest tightness and transferred on 9/18 for evaluation of possible mitral intervention with Dr. Fernandez.     Worsening renal dysfunction on labs today without clear evidence of volume expansion on exam. Will defer on additional diuretics at this time and await repeat TTE

## 2020-09-20 NOTE — PHYSICAL THERAPY INITIAL EVALUATION ADULT - ADDITIONAL COMMENTS
Pt lives w/ spouse on the first floor of a pvt home, 4 steps to enter. One child lives in the basement w/ family, & another child lives on the second floor w/ family. PTA pt reports being independent w/ all functional mobility & owns a RW/cane/rollator which she will sometimes use for ambulation.

## 2020-09-20 NOTE — PROGRESS NOTE ADULT - PROBLEM SELECTOR PLAN 3
- Currently in NSR  - continue BB as below  - Coumadin on hold. AC with heparin infusion once INR subtherapeutic

## 2020-09-20 NOTE — PROGRESS NOTE ADULT - ASSESSMENT
70 year old female PMH HTN CHF COPD valvular heart disease MVR for mitral stenosis, TVR '12 now has severe TR and PHTN transferred for heart failure optimization   and possible mitral intervention   9/19 Cardiology Consult  Heart failure consult   Continue diuresis  9/20  Diuretics discontinued as per heart failure  Await Echo  RA   ABG completed 7.38/62/44/36/75%

## 2020-09-20 NOTE — PROGRESS NOTE ADULT - PROBLEM SELECTOR PLAN 1
- volume and BP management  - will defer on additional diuretics at this time  - daily standing weights and strict I&Os.  - awaiting repeat TTE

## 2020-09-20 NOTE — PROGRESS NOTE ADULT - SUBJECTIVE AND OBJECTIVE BOX
Subjective:  - ambulated with PT this morning and notes improvement in VILLA although not at baseline. Ongoing orthopnea. Denies LH/dizziness, PND, cough, ABD discomfort and LE swelling    Medications:  acetaminophen   Tablet .. 650 milliGRAM(s) Oral every 6 hours PRN  albuterol/ipratropium for Nebulization 3 milliLiter(s) Nebulizer every 6 hours  allopurinol 100 milliGRAM(s) Oral daily  atorvastatin 40 milliGRAM(s) Oral at bedtime  furosemide   Injectable 40 milliGRAM(s) IV Push two times a day  lidocaine   Patch 1 Patch Transdermal every 24 hours  metoprolol tartrate 25 milliGRAM(s) Oral two times a day  pantoprazole    Tablet 40 milliGRAM(s) Oral before breakfast      Physical Exam:    Vitals:  Vital Signs Last 24 Hours  T(C): 36.6 (20 @ 05:13), Max: 36.8 (20 @ 19:54)  HR: 67 (20 @ 09:13) (59 - 84)  BP: 117/70 (20 @ 09:13) (117/70 - 136/73)  RR: 18 (20 @ 05:13) (18 - 18)  SpO2: 99% (20 @ 09:13) (93% - 99%)    Weight in k.2 ( @ 08:43)    I&O's Summary    19 Sep 2020 07:01  -  20 Sep 2020 07:00  --------------------------------------------------------  IN: 1200 mL / OUT: 700 mL / NET: 500 mL    20 Sep 2020 07:01  -  20 Sep 2020 13:08  --------------------------------------------------------  IN: 360 mL / OUT: 0 mL / NET: 360 mL    Tele: SR, Junctional, HR 60-70s, Burst of PAT up to 100s this morning (asymptomatic)    General: NAD. Comfortable  HEENT: EOM intact  Neck: JVD difficult to visualize but does not appear elevated  Cardiac: RRR. S1, S2, mechanical click. No peripheral edema  Chest: Clear to auscultation bilaterally  ABD: Obese. Soft, non-tender  Extremities: Warm peripherally  Neuro: A&Ox4, non-focal  Psych: Appropriate mood and affect    Labs:                        9.1    7.80  )-----------( 317      ( 20 Sep 2020 07:11 )             32.3         136  |  94<L>  |  57<H>  ----------------------------<  114<H>  4.5   |  33<H>  |  2.03<H>    Ca    9.9      20 Sep 2020 07:08    TPro  8.0  /  Alb  4.1  /  TBili  0.4  /  DBili  x   /  AST  19  /  ALT  8<L>  /  AlkPhos  113  09-20    PT/INR - ( 20 Sep 2020 07:15 )   PT: 27.3 sec;   INR: 2.39 ratio         PTT - ( 18 Sep 2020 19:53 )  PTT:47.9 sec      Serum Pro-Brain Natriuretic Peptide: 648 pg/mL ( @ 06:38)

## 2020-09-21 LAB
ALBUMIN SERPL ELPH-MCNC: 3.6 G/DL — SIGNIFICANT CHANGE UP (ref 3.3–5)
ALP SERPL-CCNC: 98 U/L — SIGNIFICANT CHANGE UP (ref 40–120)
ALT FLD-CCNC: 10 U/L — SIGNIFICANT CHANGE UP (ref 10–45)
ANION GAP SERPL CALC-SCNC: 4 MMOL/L — LOW (ref 5–17)
AST SERPL-CCNC: 17 U/L — SIGNIFICANT CHANGE UP (ref 10–40)
BILIRUB SERPL-MCNC: 0.5 MG/DL — SIGNIFICANT CHANGE UP (ref 0.2–1.2)
BUN SERPL-MCNC: 63 MG/DL — HIGH (ref 7–23)
CALCIUM SERPL-MCNC: 9.8 MG/DL — SIGNIFICANT CHANGE UP (ref 8.4–10.5)
CHLORIDE SERPL-SCNC: 97 MMOL/L — SIGNIFICANT CHANGE UP (ref 96–108)
CO2 SERPL-SCNC: 37 MMOL/L — HIGH (ref 22–31)
CREAT SERPL-MCNC: 1.69 MG/DL — HIGH (ref 0.5–1.3)
GLUCOSE SERPL-MCNC: 105 MG/DL — HIGH (ref 70–99)
HCT VFR BLD CALC: 31 % — LOW (ref 34.5–45)
HGB BLD-MCNC: 8.4 G/DL — LOW (ref 11.5–15.5)
INR BLD: 2.09 RATIO — HIGH (ref 0.88–1.16)
MCHC RBC-ENTMCNC: 24.9 PG — LOW (ref 27–34)
MCHC RBC-ENTMCNC: 27.1 GM/DL — LOW (ref 32–36)
MCV RBC AUTO: 92 FL — SIGNIFICANT CHANGE UP (ref 80–100)
NRBC # BLD: 0 /100 WBCS — SIGNIFICANT CHANGE UP (ref 0–0)
PLATELET # BLD AUTO: 289 K/UL — SIGNIFICANT CHANGE UP (ref 150–400)
POTASSIUM SERPL-MCNC: 4.7 MMOL/L — SIGNIFICANT CHANGE UP (ref 3.5–5.3)
POTASSIUM SERPL-SCNC: 4.7 MMOL/L — SIGNIFICANT CHANGE UP (ref 3.5–5.3)
PROT SERPL-MCNC: 7.3 G/DL — SIGNIFICANT CHANGE UP (ref 6–8.3)
PROTHROM AB SERPL-ACNC: 24 SEC — HIGH (ref 10.6–13.6)
RBC # BLD: 3.37 M/UL — LOW (ref 3.8–5.2)
RBC # FLD: 18.1 % — HIGH (ref 10.3–14.5)
SODIUM SERPL-SCNC: 138 MMOL/L — SIGNIFICANT CHANGE UP (ref 135–145)
WBC # BLD: 6.27 K/UL — SIGNIFICANT CHANGE UP (ref 3.8–10.5)
WBC # FLD AUTO: 6.27 K/UL — SIGNIFICANT CHANGE UP (ref 3.8–10.5)

## 2020-09-21 PROCEDURE — 99231 SBSQ HOSP IP/OBS SF/LOW 25: CPT

## 2020-09-21 PROCEDURE — 93306 TTE W/DOPPLER COMPLETE: CPT | Mod: 26

## 2020-09-21 PROCEDURE — 94010 BREATHING CAPACITY TEST: CPT | Mod: 26

## 2020-09-21 RX ORDER — HEPARIN SODIUM 5000 [USP'U]/ML
5000 INJECTION INTRAVENOUS; SUBCUTANEOUS EVERY 8 HOURS
Refills: 0 | Status: DISCONTINUED | OUTPATIENT
Start: 2020-09-21 | End: 2020-09-24

## 2020-09-21 RX ADMIN — Medication 3 MILLILITER(S): at 05:18

## 2020-09-21 RX ADMIN — LIDOCAINE 1 PATCH: 4 CREAM TOPICAL at 12:13

## 2020-09-21 RX ADMIN — Medication 100 MILLIGRAM(S): at 12:12

## 2020-09-21 RX ADMIN — Medication 25 MILLIGRAM(S): at 05:18

## 2020-09-21 RX ADMIN — Medication 650 MILLIGRAM(S): at 22:33

## 2020-09-21 RX ADMIN — LIDOCAINE 1 PATCH: 4 CREAM TOPICAL at 08:06

## 2020-09-21 RX ADMIN — Medication 25 MILLIGRAM(S): at 18:24

## 2020-09-21 RX ADMIN — LIDOCAINE 1 PATCH: 4 CREAM TOPICAL at 22:02

## 2020-09-21 RX ADMIN — ATORVASTATIN CALCIUM 40 MILLIGRAM(S): 80 TABLET, FILM COATED ORAL at 22:02

## 2020-09-21 RX ADMIN — PANTOPRAZOLE SODIUM 40 MILLIGRAM(S): 20 TABLET, DELAYED RELEASE ORAL at 05:18

## 2020-09-21 RX ADMIN — HEPARIN SODIUM 5000 UNIT(S): 5000 INJECTION INTRAVENOUS; SUBCUTANEOUS at 13:40

## 2020-09-21 RX ADMIN — Medication 3 MILLILITER(S): at 22:58

## 2020-09-21 RX ADMIN — Medication 650 MILLIGRAM(S): at 22:03

## 2020-09-21 RX ADMIN — Medication 3 MILLILITER(S): at 12:12

## 2020-09-21 RX ADMIN — HEPARIN SODIUM 5000 UNIT(S): 5000 INJECTION INTRAVENOUS; SUBCUTANEOUS at 22:02

## 2020-09-21 RX ADMIN — Medication 3 MILLILITER(S): at 18:24

## 2020-09-21 NOTE — PROGRESS NOTE ADULT - ASSESSMENT
70 year old female PMH HTN CHF COPD valvular heart disease MVR for mitral stenosis, TVR '12 now has severe TR and PHTN transferred for heart failure optimization   and possible mitral intervention   9/19 Cardiology Consult  Heart failure consult   Continue diuresis  9/20  Diuretics discontinued as per heart failure  Await Echo  RA   ABG completed 7.38/62/44/36/75% 70 year old female PMH HTN CHF COPD valvular heart disease MVR for mitral stenosis, TVR '12 now has severe TR and PHTN transferred for heart failure optimization   and possible mitral intervention   9/19 Cardiology Consult  Heart failure consult   Continue diuresis  9/20  Diuretics discontinued as per heart failure  Await Echo  RA   ABG completed 7.38/62/44/36/75%  9/21    creat down to 1.69   renal following    diuretics held     O2  3 L nc

## 2020-09-21 NOTE — PROGRESS NOTE ADULT - SUBJECTIVE AND OBJECTIVE BOX
VITAL SIGNS    Telemetry:      Vital Signs Last 24 Hrs  T(C): 36.7 (20 @ 04:38), Max: 36.7 (20 @ 04:38)  T(F): 98.1 (20 @ 04:38), Max: 98.1 (20 @ 04:38)  HR: 63 (20 @ 05:11) (63 - 103)  BP: 144/78 (20 @ 04:38) (117/70 - 160/71)  RR: 18 (20 @ 04:38) (18 - 18)  SpO2: 99% (20 @ 04:38) (99% - 100%)                   Daily     Daily Weight in k.2 (20 Sep 2020 08:43)      Bilirubin Total, Serum: 0.5 mg/dL ( @ 04:29)  Bilirubin Total, Serum: 0.4 mg/dL ( @ 07:08)    CAPILLARY BLOOD GLUCOSE                             PHYSICAL EXAM  s  "  Neurology: alert and oriented x 3, moves all extremities with no defecits  CV :  RRR    Lungs:   CTA B/L  Abdomen: soft, nontender, nondistended, positive bowel sounds, last bowel movement   Extremities:                                            VITAL SIGNS    Telemetry:     sr  60-80    Vital Signs Last 24 Hrs  T(C): 36.7 (20 @ 04:38), Max: 36.7 (20 @ 04:38)  T(F): 98.1 (20 @ 04:38), Max: 98.1 (20 @ 04:38)  HR: 63 (20 @ 05:11) (63 - 103)  BP: 144/78 (20 @ 04:38) (117/70 - 160/71)  RR: 18 (20 @ 04:38) (18 - 18)  SpO2: 99% (20 @ 04:38) (99% - 100%)                   Daily     Daily Weight in k.2 (20 Sep 2020 08:43)      Bilirubin Total, Serum: 0.5 mg/dL ( @ 04:29)  Bilirubin Total, Serum: 0.4 mg/dL ( @ 07:08)    CAPILLARY BLOOD GLUCOSE                             PHYSICAL EXAM  s  "SOB   without oxygen  No chest pain  No palpatations"  Neurology: alert and oriented x 3, moves all extremities with no defecits  CV :  RRR    Lungs:   CTA B/L  Abdomen: soft, nontender, nondistended,  obese positive bowel sounds,  Extremities:     1-2  pedal edema

## 2020-09-21 NOTE — PROGRESS NOTE ADULT - SUBJECTIVE AND OBJECTIVE BOX
SUBJECTIVE / OVERNIGHT EVENTS: pt denies chest pain, shortness of breath     MEDICATIONS  (STANDING):  albuterol/ipratropium for Nebulization 3 milliLiter(s) Nebulizer every 6 hours  allopurinol 100 milliGRAM(s) Oral daily  atorvastatin 40 milliGRAM(s) Oral at bedtime  heparin   Injectable 5000 Unit(s) SubCutaneous every 8 hours  lidocaine   Patch 1 Patch Transdermal every 24 hours  metoprolol tartrate 25 milliGRAM(s) Oral two times a day  pantoprazole    Tablet 40 milliGRAM(s) Oral before breakfast    MEDICATIONS  (PRN):  acetaminophen   Tablet .. 650 milliGRAM(s) Oral every 6 hours PRN Temp greater or equal to 38.5C (101.3F), Mild Pain (1 - 3)    Vital Signs Last 24 Hrs  T(C): 36.8 (21 Sep 2020 19:42), Max: 36.8 (21 Sep 2020 19:42)  T(F): 98.2 (21 Sep 2020 19:42), Max: 98.2 (21 Sep 2020 19:42)  HR: 83 (21 Sep 2020 19:42) (63 - 103)  BP: 145/78 (21 Sep 2020 19:42) (117/63 - 145/78)  BP(mean): --  RR: 19 (21 Sep 2020 19:42) (18 - 19)  SpO2: 100% (21 Sep 2020 19:42) (96% - 100%)    Eyes: No recent vision problems or eye pain.  ENT: No congestion, ear pain, or sore throat.  Cardiovascular: No chest pain or palpation.  Respiratory: No cough, shortness of breath, congestion, or wheezing.  Gastrointestinal: No abdominal pain, nausea, vomiting, or diarrhea.  Genitourinary: No dysuria.  Musculoskeletal: No joint swelling.  Neurologic: No headache.    PHYSICAL EXAM:  GENERAL: NAD  EYES: EOMI, PERRLA  NECK: Supple, No JVD  CHEST/LUNG: crackles at bases  HEART:  S1 , S2 +  ABDOMEN: soft , bs+  EXTREMITIES:  edema+  NEUROLOGY:alert awake oriented     LABS:      138  |  97  |  63<H>  ----------------------------<  105<H>  4.7   |  37<H>  |  1.69<H>    Ca    9.8      21 Sep 2020 04:29    TPro  7.3  /  Alb  3.6  /  TBili  0.5  /  DBili      /  AST  17  /  ALT  10  /  AlkPhos  98  09-21    Creatinine Trend: 1.69 <--, 2.03 <--, 1.62 <--, 1.43 <--                        8.4    6.27  )-----------( 289      ( 21 Sep 2020 04:29 )             31.0     Urine Studies:  Urinalysis Basic - ( 19 Sep 2020 04:15 )    Color: Light Yellow / Appearance: Clear / S.016 / pH:   Gluc:  / Ketone: Negative  / Bili: Negative / Urobili: <2 mg/dL   Blood:  / Protein: Negative / Nitrite: Negative   Leuk Esterase: Negative / RBC:  / WBC    Sq Epi:  / Non Sq Epi:  / Bacteria:             ABG - ( 20 Sep 2020 14:54 )  pH, Arterial: 7.38  pH, Blood: x     /  pCO2: 62    /  pO2: 44    / HCO3: 36    / Base Excess: 9.8   /  SaO2: 75                LIVER FUNCTIONS - ( 21 Sep 2020 04:29 )  Alb: 3.6 g/dL / Pro: 7.3 g/dL / ALK PHOS: 98 U/L / ALT: 10 U/L / AST: 17 U/L / GGT: x           PT/INR - ( 21 Sep 2020 04:29 )   PT: 24.0 sec;   INR: 2.09 ratio             Care Discussed with Consultants/Other Providers:

## 2020-09-22 LAB
ANION GAP SERPL CALC-SCNC: 7 MMOL/L — SIGNIFICANT CHANGE UP (ref 5–17)
APTT BLD: 48.7 SEC — HIGH (ref 27.5–35.5)
BUN SERPL-MCNC: 61 MG/DL — HIGH (ref 7–23)
CALCIUM SERPL-MCNC: 10.1 MG/DL — SIGNIFICANT CHANGE UP (ref 8.4–10.5)
CHLORIDE SERPL-SCNC: 97 MMOL/L — SIGNIFICANT CHANGE UP (ref 96–108)
CO2 SERPL-SCNC: 36 MMOL/L — HIGH (ref 22–31)
CREAT SERPL-MCNC: 1.61 MG/DL — HIGH (ref 0.5–1.3)
GLUCOSE SERPL-MCNC: 104 MG/DL — HIGH (ref 70–99)
HCT VFR BLD CALC: 30.3 % — LOW (ref 34.5–45)
HGB BLD-MCNC: 8.2 G/DL — LOW (ref 11.5–15.5)
INR BLD: 1.87 RATIO — HIGH (ref 0.88–1.16)
MCHC RBC-ENTMCNC: 25 PG — LOW (ref 27–34)
MCHC RBC-ENTMCNC: 27.1 GM/DL — LOW (ref 32–36)
MCV RBC AUTO: 92.4 FL — SIGNIFICANT CHANGE UP (ref 80–100)
NRBC # BLD: 0 /100 WBCS — SIGNIFICANT CHANGE UP (ref 0–0)
PLATELET # BLD AUTO: 296 K/UL — SIGNIFICANT CHANGE UP (ref 150–400)
POTASSIUM SERPL-MCNC: 5 MMOL/L — SIGNIFICANT CHANGE UP (ref 3.5–5.3)
POTASSIUM SERPL-SCNC: 5 MMOL/L — SIGNIFICANT CHANGE UP (ref 3.5–5.3)
PROTHROM AB SERPL-ACNC: 21.6 SEC — HIGH (ref 10.6–13.6)
RBC # BLD: 3.28 M/UL — LOW (ref 3.8–5.2)
RBC # FLD: 18.4 % — HIGH (ref 10.3–14.5)
SODIUM SERPL-SCNC: 140 MMOL/L — SIGNIFICANT CHANGE UP (ref 135–145)
WBC # BLD: 6.4 K/UL — SIGNIFICANT CHANGE UP (ref 3.8–10.5)
WBC # FLD AUTO: 6.4 K/UL — SIGNIFICANT CHANGE UP (ref 3.8–10.5)

## 2020-09-22 PROCEDURE — 99232 SBSQ HOSP IP/OBS MODERATE 35: CPT

## 2020-09-22 RX ADMIN — HEPARIN SODIUM 5000 UNIT(S): 5000 INJECTION INTRAVENOUS; SUBCUTANEOUS at 21:14

## 2020-09-22 RX ADMIN — HEPARIN SODIUM 5000 UNIT(S): 5000 INJECTION INTRAVENOUS; SUBCUTANEOUS at 05:12

## 2020-09-22 RX ADMIN — Medication 25 MILLIGRAM(S): at 17:16

## 2020-09-22 RX ADMIN — Medication 3 MILLILITER(S): at 17:16

## 2020-09-22 RX ADMIN — Medication 3 MILLILITER(S): at 05:12

## 2020-09-22 RX ADMIN — Medication 25 MILLIGRAM(S): at 05:11

## 2020-09-22 RX ADMIN — ATORVASTATIN CALCIUM 40 MILLIGRAM(S): 80 TABLET, FILM COATED ORAL at 21:14

## 2020-09-22 RX ADMIN — Medication 3 MILLILITER(S): at 11:06

## 2020-09-22 RX ADMIN — HEPARIN SODIUM 5000 UNIT(S): 5000 INJECTION INTRAVENOUS; SUBCUTANEOUS at 14:17

## 2020-09-22 RX ADMIN — PANTOPRAZOLE SODIUM 40 MILLIGRAM(S): 20 TABLET, DELAYED RELEASE ORAL at 05:12

## 2020-09-22 RX ADMIN — Medication 100 MILLIGRAM(S): at 11:06

## 2020-09-22 NOTE — PROGRESS NOTE ADULT - SUBJECTIVE AND OBJECTIVE BOX
VITAL SIGNS-Telemetry:  acc. [facility]. 63 alternating with SR 60-80  Vital Signs Last 24 Hrs  T(C): 36.5 (20 @ 04:56), Max: 36.8 (20 @ 19:42)  T(F): 97.7 (20 @ 04:56), Max: 98.2 (20 @ 19:42)  HR: 65 (20 @ 04:56) (65 - 83)  BP: 134/78 (20 @ 04:56) (134/78 - 145/78)  RR: 20 (20 @ 04:56) (19 - 20)  SpO2: 98% (20 @ 04:56) (98% - 100%)          @ 07:01  -   @ 07:00  --------------------------------------------------------  IN: 580 mL / OUT: 750 mL / NET: -170 mL     @ 07:01  -   @ 12:49  --------------------------------------------------------  IN: 360 mL / OUT: 400 mL / NET: -40 mL    Daily     Daily Weight in k.5 (22 Sep 2020 08:04)  :       PHYSICAL EXAM:  Neurology: alert and oriented x 3, nonfocal, no gross deficits  CV : S1S2  Lungs: CTA  Abdomen: soft, nontender, nondistended, positive bowel sounds, last bowel movement       +bm  Extremities:     +edema b/l  no calf tenderness    acetaminophen   Tablet .. 650 milliGRAM(s) Oral every 6 hours PRN  albuterol/ipratropium for Nebulization 3 milliLiter(s) Nebulizer every 6 hours  allopurinol 100 milliGRAM(s) Oral daily  atorvastatin 40 milliGRAM(s) Oral at bedtime  heparin   Injectable 5000 Unit(s) SubCutaneous every 8 hours  lidocaine   Patch 1 Patch Transdermal every 24 hours  metoprolol tartrate 25 milliGRAM(s) Oral two times a day  pantoprazole    Tablet 40 milliGRAM(s) Oral before breakfast    Physical Therapy Rec:   Home  [ x ]   Home w/ PT  [  ]  Rehab  [  ]  Discussed with Cardiothoracic Team at AM rounds.

## 2020-09-22 NOTE — DIETITIAN INITIAL EVALUATION ADULT. - NAME AND PHONE
Linda Roque MS, RDN, CDN, CDE, CSOWM. #[phone]. Recommend continue current diet order to promote glycemic control and in setting of noted impaired renal function

## 2020-09-22 NOTE — PROGRESS NOTE ADULT - SUBJECTIVE AND OBJECTIVE BOX
SUBJECTIVE / OVERNIGHT EVENTS: pt denies chest pain, shortness of breath     MEDICATIONS  (STANDING):  albuterol/ipratropium for Nebulization 3 milliLiter(s) Nebulizer every 6 hours  allopurinol 100 milliGRAM(s) Oral daily  atorvastatin 40 milliGRAM(s) Oral at bedtime  heparin   Injectable 5000 Unit(s) SubCutaneous every 8 hours  lidocaine   Patch 1 Patch Transdermal every 24 hours  metoprolol tartrate 25 milliGRAM(s) Oral two times a day  pantoprazole    Tablet 40 milliGRAM(s) Oral before breakfast    MEDICATIONS  (PRN):  acetaminophen   Tablet .. 650 milliGRAM(s) Oral every 6 hours PRN Temp greater or equal to 38.5C (101.3F), Mild Pain (1 - 3)    Vital Signs Last 24 Hrs  T(C): 36.7 (22 Sep 2020 19:53), Max: 36.8 (22 Sep 2020 14:31)  T(F): 98.1 (22 Sep 2020 19:53), Max: 98.2 (22 Sep 2020 14:31)  HR: 94 (22 Sep 2020 19:53) (63 - 94)  BP: 135/72 (22 Sep 2020 19:53) (134/78 - 149/62)  BP(mean): 97 (22 Sep 2020 04:56) (97 - 97)  RR: 18 (22 Sep 2020 19:53) (18 - 20)  SpO2: 95% (22 Sep 2020 19:53) (95% - 100%)    Eyes: No recent vision problems or eye pain.  ENT: No congestion, ear pain, or sore throat.  Cardiovascular: No chest pain or palpation.  Respiratory: No cough, shortness of breath, congestion, or wheezing.  Gastrointestinal: No abdominal pain, nausea, vomiting, or diarrhea.  Genitourinary: No dysuria.  Musculoskeletal: No joint swelling.  Neurologic: No headache.    PHYSICAL EXAM:  GENERAL: NAD  EYES: EOMI, PERRLA  NECK: Supple, No JVD  CHEST/LUNG: crackles at bases  HEART:  S1 , S2 +  ABDOMEN: soft , bs+  EXTREMITIES:  edema+  NEUROLOGY:alert awake oriented     LABS:      140  |  97  |  61<H>  ----------------------------<  104<H>  5.0   |  36<H>  |  1.61<H>    Ca    10.1      22 Sep 2020 06:11    TPro  7.3  /  Alb  3.6  /  TBili  0.5  /  DBili      /  AST  17  /  ALT  10  /  AlkPhos  98  09-21    Creatinine Trend: 1.61 <--, 1.69 <--, 2.03 <--, 1.62 <--, 1.43 <--                        8.2    6.40  )-----------( 296      ( 22 Sep 2020 06:11 )             30.3     Urine Studies:  Urinalysis Basic - ( 19 Sep 2020 04:15 )    Color: Light Yellow / Appearance: Clear / S.016 / pH:   Gluc:  / Ketone: Negative  / Bili: Negative / Urobili: <2 mg/dL   Blood:  / Protein: Negative / Nitrite: Negative   Leuk Esterase: Negative / RBC:  / WBC    Sq Epi:  / Non Sq Epi:  / Bacteria:               LIVER FUNCTIONS - ( 21 Sep 2020 04:29 )  Alb: 3.6 g/dL / Pro: 7.3 g/dL / ALK PHOS: 98 U/L / ALT: 10 U/L / AST: 17 U/L / GGT: x           PT/INR - ( 22 Sep 2020 06:11 )   PT: 21.6 sec;   INR: 1.87 ratio         PTT - ( 22 Sep 2020 06:11 )  PTT:48.7 sec

## 2020-09-22 NOTE — DIETITIAN INITIAL EVALUATION ADULT. - OTHER INFO
Pt reports good po intake/appetite at this time, denies GI distress no N/V/diarrhea or constipation. She denies food allergies or intolerance, denies chewing/swallowing difficulty. Denies taking vitamin/mineral/herbal supplements PTA. She reports weight generally stable PTA with UBW of 245 pounds; feels she gained some fluid weight recently, current weight is 248 pounds. Typical meal intake includes oatmeal, bread and eggs for breakfast; lunch often skips; dinner often chicken and rice with vegetables. Pt drinks water and regular gingerale. States her spouse prepares food for her at home. Pt denies hx of DM/pre-DM.

## 2020-09-22 NOTE — DIETITIAN INITIAL EVALUATION ADULT. - ENERGY NEEDS
ht: 64 inches. wt: 248 pounds (current, standing, +2 B/L leg edema noted). BMI: 42.0 kG/m2. UBW: 245 pounds +/- 10%. IBW: 120 pounds +/- 10%. %IBW: 207%.  Other pertinent objective information: Pt is a 70 year old female with PMH HTN, CHF, COPD valvular heart disease MVR for mitral stenosis, TVR '12 now has severe TR and HTN transferred for heart failure optimization and possible mitral intervention. Of note, pt's Hba1c: 5.8%, within pre-DM range. Skin: No pressure injuries noted per flowsheet records.

## 2020-09-22 NOTE — CHART NOTE - NSCHARTNOTEFT_GEN_A_CORE
Upon Nutritional Assessment by the Registered Dietitian your patient was determined to meet criteria / has evidence of the following diagnosis/diagnoses:          [ ]  Mild Protein Calorie Malnutrition        [ ]  Moderate Protein Calorie Malnutrition        [ ] Severe Protein Calorie Malnutrition        [ ] Unspecified Protein Calorie Malnutrition        [ ] Underweight / BMI <19        [x] Morbid Obesity / BMI > 40      Findings as based on:  [x] Comprehensive nutrition assessment   [ ] Nutrition Focused Physical Exam  [x] Other: BMI: 42.0 kG/m2.      Nutrition Plan/Recommendations:          PROVIDER Section:     By signing this assessment you are acknowledging and agree with the diagnosis/diagnoses assigned by the Registered Dietitian    Comments:

## 2020-09-22 NOTE — DIETITIAN INITIAL EVALUATION ADULT. - ADD RECOMMEND
Discussed with pt ways to help lower HbA1c and to promote heart health, including weight loss, healthful 1800 kcal diet, and physical activity per MD discretion upon d/c. Encouraged pt to limit intake of sugar-sweetened beverages, suggested suitable alternatives (diet soda, non-caloric beverages). Pt voiced understanding however appears lethargic during education and interview, therefore pt to likely benefit from additional reinforcement and review of information. Provided 1800 kcal handout.

## 2020-09-22 NOTE — PROGRESS NOTE ADULT - ASSESSMENT
70 year old female PMH HTN CHF COPD valvular heart disease MVR for mitral stenosis, TVR '12 now has severe TR and PHTN transferred for heart failure optimization   and possible mitral intervention   9/19 Cardiology Consult  Heart failure consult   Continue diuresis  9/20  Diuretics discontinued as per heart failure  Await Echo  RA   ABG completed 7.38/62/44/36/75%  9/21    creat down to 1.69   renal following    diuretics held     O2  3 L nc  9/22 VSS rounds made w/ interdisciplinary team this am .  pt with severe MIGUEL- on O2 . INR 1.87 - no need to start heparin gtt as pt is in SR. echo done 9/21 - Dr. Fernandez & HF team to review.

## 2020-09-23 DIAGNOSIS — I50.23 ACUTE ON CHRONIC SYSTOLIC (CONGESTIVE) HEART FAILURE: ICD-10-CM

## 2020-09-23 DIAGNOSIS — I11.0 HYPERTENSIVE HEART DISEASE WITH HEART FAILURE: ICD-10-CM

## 2020-09-23 DIAGNOSIS — R06.02 SHORTNESS OF BREATH: ICD-10-CM

## 2020-09-23 DIAGNOSIS — I08.3 COMBINED RHEUMATIC DISORDERS OF MITRAL, AORTIC AND TRICUSPID VALVES: ICD-10-CM

## 2020-09-23 DIAGNOSIS — E78.5 HYPERLIPIDEMIA, UNSPECIFIED: ICD-10-CM

## 2020-09-23 DIAGNOSIS — J96.02 ACUTE RESPIRATORY FAILURE WITH HYPERCAPNIA: ICD-10-CM

## 2020-09-23 DIAGNOSIS — R07.9 CHEST PAIN, UNSPECIFIED: ICD-10-CM

## 2020-09-23 DIAGNOSIS — E87.2 ACIDOSIS: ICD-10-CM

## 2020-09-23 DIAGNOSIS — M1A.00X0 IDIOPATHIC CHRONIC GOUT, UNSPECIFIED SITE, WITHOUT TOPHUS (TOPHI): ICD-10-CM

## 2020-09-23 DIAGNOSIS — Z79.01 LONG TERM (CURRENT) USE OF ANTICOAGULANTS: ICD-10-CM

## 2020-09-23 DIAGNOSIS — D50.0 IRON DEFICIENCY ANEMIA SECONDARY TO BLOOD LOSS (CHRONIC): ICD-10-CM

## 2020-09-23 DIAGNOSIS — J44.9 CHRONIC OBSTRUCTIVE PULMONARY DISEASE, UNSPECIFIED: ICD-10-CM

## 2020-09-23 DIAGNOSIS — I27.20 PULMONARY HYPERTENSION, UNSPECIFIED: ICD-10-CM

## 2020-09-23 DIAGNOSIS — I45.10 UNSPECIFIED RIGHT BUNDLE-BRANCH BLOCK: ICD-10-CM

## 2020-09-23 DIAGNOSIS — E66.01 MORBID (SEVERE) OBESITY DUE TO EXCESS CALORIES: ICD-10-CM

## 2020-09-23 LAB
ANION GAP SERPL CALC-SCNC: 8 MMOL/L — SIGNIFICANT CHANGE UP (ref 5–17)
APTT BLD: 43.7 SEC — HIGH (ref 27.5–35.5)
BUN SERPL-MCNC: 61 MG/DL — HIGH (ref 7–23)
CALCIUM SERPL-MCNC: 10.2 MG/DL — SIGNIFICANT CHANGE UP (ref 8.4–10.5)
CHLORIDE SERPL-SCNC: 97 MMOL/L — SIGNIFICANT CHANGE UP (ref 96–108)
CO2 SERPL-SCNC: 36 MMOL/L — HIGH (ref 22–31)
CREAT SERPL-MCNC: 1.82 MG/DL — HIGH (ref 0.5–1.3)
GLUCOSE SERPL-MCNC: 113 MG/DL — HIGH (ref 70–99)
HCT VFR BLD CALC: 29.5 % — LOW (ref 34.5–45)
HGB BLD-MCNC: 8.3 G/DL — LOW (ref 11.5–15.5)
INR BLD: 1.63 RATIO — HIGH (ref 0.88–1.16)
MCHC RBC-ENTMCNC: 26 PG — LOW (ref 27–34)
MCHC RBC-ENTMCNC: 28.1 GM/DL — LOW (ref 32–36)
MCV RBC AUTO: 92.5 FL — SIGNIFICANT CHANGE UP (ref 80–100)
NRBC # BLD: 0 /100 WBCS — SIGNIFICANT CHANGE UP (ref 0–0)
PLATELET # BLD AUTO: 274 K/UL — SIGNIFICANT CHANGE UP (ref 150–400)
POTASSIUM SERPL-MCNC: 4.8 MMOL/L — SIGNIFICANT CHANGE UP (ref 3.5–5.3)
POTASSIUM SERPL-SCNC: 4.8 MMOL/L — SIGNIFICANT CHANGE UP (ref 3.5–5.3)
PROTHROM AB SERPL-ACNC: 18.9 SEC — HIGH (ref 10.6–13.6)
RBC # BLD: 3.19 M/UL — LOW (ref 3.8–5.2)
RBC # FLD: 18.4 % — HIGH (ref 10.3–14.5)
SODIUM SERPL-SCNC: 141 MMOL/L — SIGNIFICANT CHANGE UP (ref 135–145)
WBC # BLD: 6.33 K/UL — SIGNIFICANT CHANGE UP (ref 3.8–10.5)
WBC # FLD AUTO: 6.33 K/UL — SIGNIFICANT CHANGE UP (ref 3.8–10.5)

## 2020-09-23 PROCEDURE — 99233 SBSQ HOSP IP/OBS HIGH 50: CPT

## 2020-09-23 PROCEDURE — 99223 1ST HOSP IP/OBS HIGH 75: CPT

## 2020-09-23 RX ORDER — BUMETANIDE 0.25 MG/ML
2 INJECTION INTRAMUSCULAR; INTRAVENOUS DAILY
Refills: 0 | Status: DISCONTINUED | OUTPATIENT
Start: 2020-09-23 | End: 2020-09-25

## 2020-09-23 RX ADMIN — Medication 650 MILLIGRAM(S): at 21:55

## 2020-09-23 RX ADMIN — Medication 3 MILLILITER(S): at 11:13

## 2020-09-23 RX ADMIN — HEPARIN SODIUM 5000 UNIT(S): 5000 INJECTION INTRAVENOUS; SUBCUTANEOUS at 21:24

## 2020-09-23 RX ADMIN — Medication 3 MILLILITER(S): at 17:29

## 2020-09-23 RX ADMIN — Medication 25 MILLIGRAM(S): at 17:29

## 2020-09-23 RX ADMIN — Medication 3 MILLILITER(S): at 07:40

## 2020-09-23 RX ADMIN — BUMETANIDE 2 MILLIGRAM(S): 0.25 INJECTION INTRAMUSCULAR; INTRAVENOUS at 21:24

## 2020-09-23 RX ADMIN — HEPARIN SODIUM 5000 UNIT(S): 5000 INJECTION INTRAVENOUS; SUBCUTANEOUS at 06:05

## 2020-09-23 RX ADMIN — Medication 100 MILLIGRAM(S): at 11:12

## 2020-09-23 RX ADMIN — ATORVASTATIN CALCIUM 40 MILLIGRAM(S): 80 TABLET, FILM COATED ORAL at 21:24

## 2020-09-23 RX ADMIN — Medication 650 MILLIGRAM(S): at 21:25

## 2020-09-23 RX ADMIN — Medication 25 MILLIGRAM(S): at 06:05

## 2020-09-23 RX ADMIN — HEPARIN SODIUM 5000 UNIT(S): 5000 INJECTION INTRAVENOUS; SUBCUTANEOUS at 16:25

## 2020-09-23 RX ADMIN — PANTOPRAZOLE SODIUM 40 MILLIGRAM(S): 20 TABLET, DELAYED RELEASE ORAL at 06:05

## 2020-09-23 RX ADMIN — Medication 3 MILLILITER(S): at 02:24

## 2020-09-23 NOTE — PROGRESS NOTE ADULT - PROBLEM SELECTOR PLAN 1
- may benefit from RHC/MEMs given multiple admissions in the past   - will start on bumex 2 mg daily PO   - daily standing weights and strict I&Os.  - will review TTE and consider ELIZABETH to further assess given poor windows

## 2020-09-23 NOTE — PROGRESS NOTE ADULT - SUBJECTIVE AND OBJECTIVE BOX
Interval History:  TTE done which suggested normal mitral valve gradients  still dyspneic    Medications:  acetaminophen   Tablet .. 650 milliGRAM(s) Oral every 6 hours PRN  albuterol/ipratropium for Nebulization 3 milliLiter(s) Nebulizer every 6 hours  allopurinol 100 milliGRAM(s) Oral daily  atorvastatin 40 milliGRAM(s) Oral at bedtime  heparin   Injectable 5000 Unit(s) SubCutaneous every 8 hours  lidocaine   Patch 1 Patch Transdermal every 24 hours  metoprolol tartrate 25 milliGRAM(s) Oral two times a day  pantoprazole    Tablet 40 milliGRAM(s) Oral before breakfast      Vitals:  T(C): 36.7 (20 @ 19:41), Max: 36.8 (20 @ 05:20)  HR: 96 (20 @ 19:41) (56 - 96)  BP: 153/79 (20 @ 19:41) (108/60 - 153/79)  BP(mean): --  RR: 18 (20 @ 19:41) (18 - 18)  SpO2: 97% (20 @ 19:41) (97% - 100%)    Daily     Daily Weight in k (23 Sep 2020 07:13)        I&O's Summary    22 Sep 2020 07:  -  23 Sep 2020 07:00  --------------------------------------------------------  IN: 960 mL / OUT: 1740 mL / NET: -780 mL    23 Sep 2020 07:  -  23 Sep 2020 20:33  --------------------------------------------------------  IN: 500 mL / OUT: 900 mL / NET: -400 mL    Physical Exam:  Appearance: No Acute Distress. Obese  HEENT: PERRL  Neck: JVD approx 10 cm with mild HJR  Cardiovascular: Normal S1 S2, No murmurs/rubs/gallops  Respiratory: Clear to auscultation bilaterally  Gastrointestinal: Soft, Non-tender	  Skin: No cyanosis	  Neurologic: Non-focal  Extremities: No LE edema  Psychiatry: A & O x 3, Mood & affect appropriate    Labs:                        8.3    6.33  )-----------( 274      ( 23 Sep 2020 07:17 )             29.5     -    141  |  97  |  61<H>  ----------------------------<  113<H>  4.8   |  36<H>  |  1.82<H>    Ca    10.2      23 Sep 2020 07:13      PT/INR - ( 23 Sep 2020 07:17 )   PT: 18.9 sec;   INR: 1.63 ratio         PTT - ( 23 Sep 2020 07:17 )  PTT:43.7 sec

## 2020-09-23 NOTE — PROGRESS NOTE ADULT - ASSESSMENT
Ms. Alcantara is a sandra 70 year old woman with HFpEF, HTN (diagnosed in 30's and reportedly well controlled with medications), severe MR s/p MVR 1999 ([facility] with Dr. Anderson) and severe TR s/p TVR 2012 ([facility]), ?COPD (home 02; no prior tobacco use), AFib on Coumadin (s/p ablation), PHTN, CKD 3 (b/l SCr ~1.3-1.6), MIGUEL and gout. Followed by Dr. Duarte (cardiologist) who presented to [facility] with SOB and chest tightness transferred on 8/18 for possible mitral intervention for possible mitral stenosis with Dr. Fernandez. Diuretics were held due to worsening renal dysfunction. ABG was consistent with hypercarbia and she reports previously needing Bipap but hasn't used in 10 years. TTE performed with normal LV function with limited views to assess mitral valve however gradients were within normal limits. Suspect she has a component of acute on chronic HFpEF as well as OHS/MIGUEL.

## 2020-09-23 NOTE — CONSULT NOTE ADULT - SUBJECTIVE AND OBJECTIVE BOX
PULMONARY CONSULT NOTE      DAMARI GUERRERO  MRN-[id number]    Patient is a 70y old  Female who presents with a chief complaint of mitral regurgitation, heart failure (22 Sep 2020 12:48)      HISTORY OF PRESENT ILLNESS:  71 yo female , never smoker with PMh of HTN, A fib, Mitral & Tricuspid valve replacement, CHF, COPD(?) on home O2, Obesity, MIGUEL(did not tolerate CPAP), pHTN, Gout  transferred for heart failure optimization and possible mitral intervention . She has been managed for acute on chronic heart failure- initially receiving diuretics which has now been heldf or MISAEL and was on AC for afib  has been evaluated by CTS for mitral valve stenosis     She is on 3L NC now, reports history of MIGUEL? diagnosed - does not know severity. states she was on a PAP? machine but it was taken away by the company  never smoker. has nots een pulm outpatient. states she has albuterol PRN  feels dyspneic still  no cough, no wheezing      Allergies    No Known Allergies    Intolerances        PAST MEDICAL & SURGICAL HISTORY:  Tricuspid valve replaced    Mitral valve replaced    Gout    HTN (hypertension)    CHF (congestive heart failure)    COPD (chronic obstructive pulmonary disease)  on home O2    MIGUEL (obstructive sleep apnea)    Pulmonary hypertension    Atrial fibrillation  S/P Ablation    Tricuspid valve replaced  mechanical    History of mitral valve replacement with mechanical valve            FAMILY HISTORY:  Family history of hypertension (Mother)    Family history of stroke    Family history of hypertension (Father, Sibling)      Prescriptions:      SOCIAL HISTORY  Smoking History:   never smoker    REVIEW OF SYSTEMS:    CONSTITUTIONAL:  No fevers, chills, sweats    HEENT:  Eyes:  No diplopia or blurred vision. ENT:  No earache, sore throat or runny nose.    CARDIOVASCULAR:  No pressure, squeezing, tightness, or heaviness about the chest; no palpitations.    RESPIRATORY:  Per HPI    GASTROINTESTINAL:  No abdominal pain, nausea, vomiting or diarrhea.    GENITOURINARY:  No dysuria, frequency or urgency.    NEUROLOGIC:  No paresthesias, fasciculations, seizures or weakness.    PSYCHIATRIC:  No disorder of thought or mood.    Vital Signs Last 24 Hrs  T(C): 36.4 (23 Sep 2020 12:51), Max: 36.8 (23 Sep 2020 05:20)  T(F): 97.5 (23 Sep 2020 12:51), Max: 98.2 (23 Sep 2020 05:20)  HR: 56 (23 Sep 2020 12:51) (56 - 94)  BP: 108/60 (23 Sep 2020 12:51) (108/60 - 135/72)  BP(mean): --  RR: 18 (23 Sep 2020 12:51) (18 - 18)  SpO2: 98% (23 Sep 2020 12:51) (95% - 100%)    PHYSICAL EXAMINATION:    GENERAL: The patient is a well-developed, well-nourished femlae in no apparent distress.     HEENT: Head is normocephalic and atraumatic. Extraocular muscles are intact. Mucous membranes are moist.     NECK: Supple.     LUNGS: Clear to auscultation without wheezing, rales, or rhonchi. Respirations unlabored    HEART:  +murmur    ABDOMEN: Soft, nontender, and nondistended.  No hepatosplenomegaly is noted.  obese  EXTREMITIES: Without any cyanosis, clubbing, rash, lesions or edema.    NEUROLOGIC: Grossly intact      MEDICATIONS  (STANDING):  albuterol/ipratropium for Nebulization 3 milliLiter(s) Nebulizer every 6 hours  allopurinol 100 milliGRAM(s) Oral daily  atorvastatin 40 milliGRAM(s) Oral at bedtime  heparin   Injectable 5000 Unit(s) SubCutaneous every 8 hours  lidocaine   Patch 1 Patch Transdermal every 24 hours  metoprolol tartrate 25 milliGRAM(s) Oral two times a day  pantoprazole    Tablet 40 milliGRAM(s) Oral before breakfast      MEDICATIONS  (PRN):  acetaminophen   Tablet .. 650 milliGRAM(s) Oral every 6 hours PRN Temp greater or equal to 38.5C (101.3F), Mild Pain (1 - 3)        LABS:   CBC Full  -  ( 23 Sep 2020 07:17 )  WBC Count : 6.33 K/uL  RBC Count : 3.19 M/uL  Hemoglobin : 8.3 g/dL  Hematocrit : 29.5 %  Platelet Count - Automated : 274 K/uL  Mean Cell Volume : 92.5 fl  Mean Cell Hemoglobin : 26.0 pg  Mean Cell Hemoglobin Concentration : 28.1 gm/dL  Auto Neutrophil # : x  Auto Lymphocyte # : x  Auto Monocyte # : x  Auto Eosinophil # : x  Auto Basophil # : x  Auto Neutrophil % : x  Auto Lymphocyte % : x  Auto Monocyte % : x  Auto Eosinophil % : x  Auto Basophil % : x    PT/INR - ( 23 Sep 2020 07:17 )   PT: 18.9 sec;   INR: 1.63 ratio         PTT - ( 23 Sep 2020 07:17 )  PTT:43.7 sec  09-23    141  |  97  |  61<H>  ----------------------------<  113<H>  4.8   |  36<H>  |  1.82<H>    Ca    10.2      23 Sep 2020 07:13                  RADIOLOGY & ADDITIONAL STUDIES:  < from: TTE with Doppler (w/Cont) (09.21.20 @ 17:28) >    ------------------------------------------------------------------------  Dimensions:    Normal Values:  LA:            2.0 - 4.0 cm  Ao:     3.5    2.0 - 3.8 cm  SEPTUM: 1.1    0.6 - 1.2 cm  PWT:    0.9    0.6 - 1.1 cm  LVIDd:  5.7    3.0 - 5.6 cm  LVIDs:  3.5    1.8 - 4.0 cm  Derived variables:  LVMI: 111 g/m2  RWT: 0.31  Fractional short: 39 %  EF (Visual Estimate): 60-65 %  Doppler Peak Velocity (m/sec): MV=1.6 TV=1.3  ------------------------------------------------------------------------  Observations:  Mitral Valve: Bioprosthetic mitral valve replacement.  The  valve is not well visualized.  No mitral valvular or  paravalvular regurgitation seen. Peak mitral valve gradient  equals 10 mm Hg, mean transmitral valve gradient equals 5  mm Hg, which is probably normal in the setting of a  bioprosthetic mitral valve replacement.  Gradients measured  at 80bpm.  Aortic Valve/Aorta: Aortic valve not well visualized;  appears calcified. No aortic valve regurgitation seen.  Aortic Root: 3.5 cm.  Left Atrium: Left atrium not well visualized.  Left Ventricle: Endocardial visualization enhanced with  intravenous injection of Ultrasonic Enhancing Agent  (Definity). Normal left ventricular systolic function.  Flattening of the interventricular septum in both systole  and diastole is  consistent with right ventricular pressure  overload. Normal left ventricular internal dimensions and  wall thicknesses. Unable to assess.  Right Heart: Right atrium not well visualized. The right  ventricle is not well visualized. Bioprosthetic Tricuspid  valve replacement. The valve is not well visualized.  Normal pulmonic valve. Mild pulmonic regurgitation.  Pericardium/Pleura: Normal pericardium with no pericardial  effusion.  ------------------------------------------------------------------------  Conclusions:  1. Bioprosthetic mitral valve replacement.  The valve is  not well visualized.  No mitral valvular or paravalvular  regurgitation seen. Peak mitral valve gradient equals 10 mm  Hg, mean transmitral valve gradient equals 5 mm Hg, which  is probably normal in the setting of a bioprosthetic mitral  valve replacement.  Gradients measured at 80bpm.  2. Aortic valve not well visualized; appears calcified. No  aortic valve regurgitation seen.  3. Endocardial visualization enhanced with intravenous  injection of Ultrasonic Enhancing Agent (Definity). Normal  left ventricular systolic function.  ------------------------------------------------------------------------  Confirmed on  9/22/2020 - 13:47:58 by Stu Domínguez M.D.  ------------------------------------------------------------------------    < end of copied text >    ECHO:  < from: Xray Chest 1 View- PORTABLE-Urgent (Xray Chest 1 View- PORTABLE-Urgent .) (09.18.20 @ 21:07) >    EXAM:  XR CHEST PORTABLE URGENT 1V                            PROCEDURE DATE:  09/18/2020            INTERPRETATION:  CLINICAL HISTORY: CHF.    TECHNIQUE: Single upright AP chest radiograph.    COMPARISON: Comparison with previous from 6/20/2020.    COMMENT:  Sternotomy wires are noted.    There is mild pulmonary vascular congestion.  There is no focal airspace consolidation.  There are no pleural effusions.    The mediastinal contour is markedly enlarged.  The trachea is midline.    The osseous structures are intact.    IMPRESSION:  Marked cardiomegaly with mild pulmonary vascular congestion likely representing mild fluid overload                  QUANG CAVANAUGH M.D., ATTENDING RADIOLOGIST  This document has been electronically signed. Sep19 2020 11:07AM    < end of copied text >  < from: CT Angio Chest w/Cont (03.12.09 @ 16:28) >    EXAM:  CT CTA CHEST W CONTRAST        EXAM:  CT CTA ABDOMEN WITH CONTRAST        PROCEDURE DATE:  Mar 12 2009     .    INTERPRETATION:  HISTORY: 58-year-old woman with possible fluid   collection. Rule out hematoma or dissection.    TECHNIQUE:  CT of the chest and abdomen was performed following the departmental   dissection protocol after the uncomplicated administration of 25 cc of   Isovue-370 at a rate of 3.5 cc/sec. 0 cc of contrast was discarded.    COMPARISON: Comparison is made to the noncontrast chest CT on the same   day.    FINDINGS:    There is no evidence of pseudoaneurysm or contrast extravasation. The   thoracic and abdominal aorta is normal in caliber. There is no   dissection. The great vessels and aortic arch are patent. The celiac   axis, SMA and JENNIFER and both renal arteries are patent. The bilateral   common iliac arteries are patent and normal in caliber.     There is no hilar or mediastinal lymphadenopathy. Nonspecific prominent   bilateral axillary lymph nodes again noted. The heart size is enlarged.   There is no pericardial effusion. Mitral annular calcifications are   identified.     The main pulmonary arteries are normal in caliber and there are no   filling defects to suggest pulmonary embolus. Contrast refluxes into the   IVC and hepatic veins. There is no pleural effusion. The trachea and   central bronchi are patent.      Mosaic pattern attenuation is unchanged with small caliber vessels in the   lucent line. Findings are likely due to air trapping. There are no   pulmonary masses, consolidations or pulmonary nodules. There is no   pneumothorax.    Evaluation of the abdominal parenchymal organs is limited by early phase   imaging.   The liver is normal. There is no intra or extrahepatic biliary   dilatation. The gallbladder is fluid filled. The spleen, pancreas and   adrenal glands are normal. The kidneys enhance symmetrically and there is   no hydronephrosis. There is no retroperitoneal lymphadenopathy or   ascites.      The patient is status post median sternotomy.        IMPRESSION:    No evidence of pseudoaneurysm or aortic dissection.  Mosaic pattern of attenuation likely due to air trapping.    < end of copied text >    ASSESSMENT:  71 yo yo female with valvular disease, CHF being evaluated for dyspnea    PLAN:  ABG from previous admission show hypercarbia  probable MIGUEL/OHS  trial of BPAP 10/5cmh20 fi02- 30%  02 can be tapered during the day  she would need formal pfts   formal sleep study  f/u cardio & HF team    Thank you for allowing me to participate in the care of this patient.  Please feel free to call me for any questions/concerns.      Irma Eaton DO  [facility] Pulmonary/Sleep Medicine  [phone]

## 2020-09-24 LAB
ANION GAP SERPL CALC-SCNC: 8 MMOL/L — SIGNIFICANT CHANGE UP (ref 5–17)
APTT BLD: >200 SEC — CRITICAL HIGH (ref 27.5–35.5)
BUN SERPL-MCNC: 62 MG/DL — HIGH (ref 7–23)
CALCIUM SERPL-MCNC: 10.2 MG/DL — SIGNIFICANT CHANGE UP (ref 8.4–10.5)
CHLORIDE SERPL-SCNC: 95 MMOL/L — LOW (ref 96–108)
CO2 SERPL-SCNC: 36 MMOL/L — HIGH (ref 22–31)
CREAT SERPL-MCNC: 1.61 MG/DL — HIGH (ref 0.5–1.3)
GLUCOSE SERPL-MCNC: 98 MG/DL — SIGNIFICANT CHANGE UP (ref 70–99)
HCT VFR BLD CALC: 30.2 % — LOW (ref 34.5–45)
HCT VFR BLD CALC: 30.2 % — LOW (ref 34.5–45)
HGB BLD-MCNC: 8.4 G/DL — LOW (ref 11.5–15.5)
HGB BLD-MCNC: 8.5 G/DL — LOW (ref 11.5–15.5)
MAGNESIUM SERPL-MCNC: 2.2 MG/DL — SIGNIFICANT CHANGE UP (ref 1.6–2.6)
MCHC RBC-ENTMCNC: 25.8 PG — LOW (ref 27–34)
MCHC RBC-ENTMCNC: 25.8 PG — LOW (ref 27–34)
MCHC RBC-ENTMCNC: 27.8 GM/DL — LOW (ref 32–36)
MCHC RBC-ENTMCNC: 28.1 GM/DL — LOW (ref 32–36)
MCV RBC AUTO: 91.5 FL — SIGNIFICANT CHANGE UP (ref 80–100)
MCV RBC AUTO: 92.6 FL — SIGNIFICANT CHANGE UP (ref 80–100)
NRBC # BLD: 0 /100 WBCS — SIGNIFICANT CHANGE UP (ref 0–0)
NRBC # BLD: 0 /100 WBCS — SIGNIFICANT CHANGE UP (ref 0–0)
PHOSPHATE SERPL-MCNC: 3.5 MG/DL — SIGNIFICANT CHANGE UP (ref 2.5–4.5)
PLATELET # BLD AUTO: 264 K/UL — SIGNIFICANT CHANGE UP (ref 150–400)
PLATELET # BLD AUTO: 275 K/UL — SIGNIFICANT CHANGE UP (ref 150–400)
POTASSIUM SERPL-MCNC: 5 MMOL/L — SIGNIFICANT CHANGE UP (ref 3.5–5.3)
POTASSIUM SERPL-SCNC: 5 MMOL/L — SIGNIFICANT CHANGE UP (ref 3.5–5.3)
RBC # BLD: 3.26 M/UL — LOW (ref 3.8–5.2)
RBC # BLD: 3.3 M/UL — LOW (ref 3.8–5.2)
RBC # FLD: 18.1 % — HIGH (ref 10.3–14.5)
RBC # FLD: 18.3 % — HIGH (ref 10.3–14.5)
SODIUM SERPL-SCNC: 139 MMOL/L — SIGNIFICANT CHANGE UP (ref 135–145)
WBC # BLD: 5.53 K/UL — SIGNIFICANT CHANGE UP (ref 3.8–10.5)
WBC # BLD: 8.61 K/UL — SIGNIFICANT CHANGE UP (ref 3.8–10.5)
WBC # FLD AUTO: 5.53 K/UL — SIGNIFICANT CHANGE UP (ref 3.8–10.5)
WBC # FLD AUTO: 8.61 K/UL — SIGNIFICANT CHANGE UP (ref 3.8–10.5)

## 2020-09-24 PROCEDURE — 99232 SBSQ HOSP IP/OBS MODERATE 35: CPT

## 2020-09-24 RX ORDER — HEPARIN SODIUM 5000 [USP'U]/ML
4000 INJECTION INTRAVENOUS; SUBCUTANEOUS EVERY 6 HOURS
Refills: 0 | Status: DISCONTINUED | OUTPATIENT
Start: 2020-09-24 | End: 2020-09-25

## 2020-09-24 RX ORDER — HEPARIN SODIUM 5000 [USP'U]/ML
9000 INJECTION INTRAVENOUS; SUBCUTANEOUS EVERY 6 HOURS
Refills: 0 | Status: DISCONTINUED | OUTPATIENT
Start: 2020-09-24 | End: 2020-09-25

## 2020-09-24 RX ORDER — HEPARIN SODIUM 5000 [USP'U]/ML
INJECTION INTRAVENOUS; SUBCUTANEOUS
Qty: 25000 | Refills: 0 | Status: DISCONTINUED | OUTPATIENT
Start: 2020-09-24 | End: 2020-09-25

## 2020-09-24 RX ORDER — HEPARIN SODIUM 5000 [USP'U]/ML
9000 INJECTION INTRAVENOUS; SUBCUTANEOUS ONCE
Refills: 0 | Status: COMPLETED | OUTPATIENT
Start: 2020-09-24 | End: 2020-09-24

## 2020-09-24 RX ADMIN — HEPARIN SODIUM 0 UNIT(S)/HR: 5000 INJECTION INTRAVENOUS; SUBCUTANEOUS at 23:28

## 2020-09-24 RX ADMIN — Medication 25 MILLIGRAM(S): at 06:19

## 2020-09-24 RX ADMIN — HEPARIN SODIUM 5000 UNIT(S): 5000 INJECTION INTRAVENOUS; SUBCUTANEOUS at 15:23

## 2020-09-24 RX ADMIN — PANTOPRAZOLE SODIUM 40 MILLIGRAM(S): 20 TABLET, DELAYED RELEASE ORAL at 06:19

## 2020-09-24 RX ADMIN — Medication 100 MILLIGRAM(S): at 13:50

## 2020-09-24 RX ADMIN — ATORVASTATIN CALCIUM 40 MILLIGRAM(S): 80 TABLET, FILM COATED ORAL at 21:44

## 2020-09-24 RX ADMIN — Medication 3 MILLILITER(S): at 06:19

## 2020-09-24 RX ADMIN — HEPARIN SODIUM 5000 UNIT(S): 5000 INJECTION INTRAVENOUS; SUBCUTANEOUS at 06:19

## 2020-09-24 RX ADMIN — Medication 3 MILLILITER(S): at 13:50

## 2020-09-24 RX ADMIN — BUMETANIDE 2 MILLIGRAM(S): 0.25 INJECTION INTRAMUSCULAR; INTRAVENOUS at 06:19

## 2020-09-24 RX ADMIN — Medication 25 MILLIGRAM(S): at 17:33

## 2020-09-24 RX ADMIN — HEPARIN SODIUM 1900 UNIT(S)/HR: 5000 INJECTION INTRAVENOUS; SUBCUTANEOUS at 15:27

## 2020-09-24 RX ADMIN — Medication 3 MILLILITER(S): at 18:31

## 2020-09-24 NOTE — PROGRESS NOTE ADULT - SUBJECTIVE AND OBJECTIVE BOX
PULMONARY PROGRESS NOTE    DAMARI GUERRERO  MRN-[id number]    Patient is a 70y old  Female who presents with a chief complaint of mitral regurgitation, heart failure (23 Sep 2020 20:33)      HPI:  on 4L sitting in chair  comfortable      ROS:   -    ACTIVE MEDICATION LIST:  MEDICATIONS  (STANDING):  albuterol/ipratropium for Nebulization 3 milliLiter(s) Nebulizer every 6 hours  allopurinol 100 milliGRAM(s) Oral daily  atorvastatin 40 milliGRAM(s) Oral at bedtime  buMETAnide 2 milliGRAM(s) Oral daily  heparin  Infusion.  Unit(s)/Hr (19 mL/Hr) IV Continuous <Continuous>  lidocaine   Patch 1 Patch Transdermal every 24 hours  metoprolol tartrate 25 milliGRAM(s) Oral two times a day  pantoprazole    Tablet 40 milliGRAM(s) Oral before breakfast    MEDICATIONS  (PRN):  acetaminophen   Tablet .. 650 milliGRAM(s) Oral every 6 hours PRN Temp greater or equal to 38.5C (101.3F), Mild Pain (1 - 3)  heparin   Injectable 9000 Unit(s) IV Push every 6 hours PRN For aPTT less than 40  heparin   Injectable 4000 Unit(s) IV Push every 6 hours PRN For aPTT between 40 - 57      EXAM:  Vital Signs Last 24 Hrs  T(C): 36.6 (24 Sep 2020 14:39), Max: 36.7 (23 Sep 2020 19:41)  T(F): 97.9 (24 Sep 2020 14:39), Max: 98.1 (23 Sep 2020 19:41)  HR: 63 (24 Sep 2020 14:39) (58 - 96)  BP: 118/68 (24 Sep 2020 14:39) (117/67 - 153/79)  BP(mean): --  RR: 18 (24 Sep 2020 14:39) (16 - 18)  SpO2: 99% (24 Sep 2020 14:39) (97% - 100%)    GENERAL: The patient is awake and alert in no apparent distress.     LUNGS: Clear to auscultation without wheezing, rales or rhonchi; respirations unlabored    HEART: Regular rate and rhythm without murmur.                            8.4    5.53  )-----------( 264      ( 24 Sep 2020 10:31 )             30.2       09-24    139  |  95<L>  |  62<H>  ----------------------------<  98  5.0   |  36<H>  |  1.61<H>    Ca    10.2      24 Sep 2020 10:31  Phos  3.5     09-24  Mg     2.2     09-24       ra< from: Xray Chest 1 View- PORTABLE-Urgent (Xray Chest 1 View- PORTABLE-Urgent .) (09.18.20 @ 21:07) >    EXAM:  XR CHEST PORTABLE URGENT 1V                            PROCEDURE DATE:  09/18/2020            INTERPRETATION:  CLINICAL HISTORY: CHF.    TECHNIQUE: Single upright AP chest radiograph.    COMPARISON: Comparison with previous from 6/20/2020.    COMMENT:  Sternotomy wires are noted.    There is mild pulmonary vascular congestion.  There is no focal airspace consolidation.  There are no pleural effusions.    The mediastinal contour is markedly enlarged.  The trachea is midline.    The osseous structures are intact.    IMPRESSION:  Marked cardiomegaly with mild pulmonary vascular congestion likely representing mild fluid overload                  QUANG CAVANAUGH M.D., ATTENDING RADIOLOGIST  This document has been electronically signed. Sep19 2020 11:07AM    < end of copied text >    PROBLEM LIST:  70y Female with HEALTH ISSUES - PROBLEM Dx:  Shortness of breath  Shortness of breath    Chronic kidney disease (CKD), stage III (moderate)  Chronic kidney disease (CKD), stage III (moderate)    Tricuspid valve replaced  Tricuspid valve replaced    Mitral valve replaced  Mitral valve replaced    Atrial fibrillation, unspecified type  Atrial fibrillation, unspecified type    Essential hypertension  Essential hypertension    Acute on chronic heart failure with preserved ejection fraction  Acute on chronic heart failure with preserved ejection fraction    Mitral valve stenosis, unspecified etiology  Mitral valve stenosis, unspecified etiology    CHF (congestive heart failure)  CHF (congestive heart failure)    Gout  Gout    MIGUEL (obstructive sleep apnea)  MIGUEL (obstructive sleep apnea)    Atrial fibrillation  Atrial fibrillation         RECS:  probable MIGUEL/OHS  trial of BPAP 10/5cmh20 fi02- 30%  trial of 3L 02 c during the day  she would need formal pfts   formal sleep study    Please call with any questions.    Irma Eaton,   [facility] Pulmonary/Sleep Medicine  [phone]

## 2020-09-24 NOTE — PROGRESS NOTE ADULT - SUBJECTIVE AND OBJECTIVE BOX
SUBJECTIVE / OVERNIGHT EVENTS: pt denies chest pain, shortness of breath     MEDICATIONS  (STANDING):  albuterol/ipratropium for Nebulization 3 milliLiter(s) Nebulizer every 6 hours  allopurinol 100 milliGRAM(s) Oral daily  atorvastatin 40 milliGRAM(s) Oral at bedtime  buMETAnide 2 milliGRAM(s) Oral daily  heparin  Infusion.  Unit(s)/Hr (19 mL/Hr) IV Continuous <Continuous>  lidocaine   Patch 1 Patch Transdermal every 24 hours  metoprolol tartrate 25 milliGRAM(s) Oral two times a day  pantoprazole    Tablet 40 milliGRAM(s) Oral before breakfast    MEDICATIONS  (PRN):  acetaminophen   Tablet .. 650 milliGRAM(s) Oral every 6 hours PRN Temp greater or equal to 38.5C (101.3F), Mild Pain (1 - 3)  heparin   Injectable 9000 Unit(s) IV Push every 6 hours PRN For aPTT less than 40  heparin   Injectable 4000 Unit(s) IV Push every 6 hours PRN For aPTT between 40 - 57    Vital Signs Last 24 Hrs  T(C): 37 (24 Sep 2020 19:34), Max: 37 (24 Sep 2020 19:34)  T(F): 98.6 (24 Sep 2020 19:34), Max: 98.6 (24 Sep 2020 19:34)  HR: 66 (24 Sep 2020 23:44) (58 - 73)  BP: 133/89 (24 Sep 2020 23:44) (106/56 - 133/89)  BP(mean): --  RR: 18 (24 Sep 2020 23:44) (16 - 18)  SpO2: 95% (24 Sep 2020 23:44) (94% - 100%)    Eyes: No recent vision problems or eye pain.  ENT: No congestion, ear pain, or sore throat.  Cardiovascular: No chest pain or palpation.  Respiratory: No cough, shortness of breath, congestion, or wheezing.  Gastrointestinal: No abdominal pain, nausea, vomiting, or diarrhea.  Genitourinary: No dysuria.  Musculoskeletal: No joint swelling.  Neurologic: No headache.    PHYSICAL EXAM:  GENERAL: NAD  EYES: EOMI, PERRLA  NECK: Supple, No JVD  CHEST/LUNG: crackles at bases  HEART:  S1 , S2 +  ABDOMEN: soft , bs+  EXTREMITIES:  edema+  NEUROLOGY:alert awake oriented     LABS:      139  |  95<L>  |  62<H>  ----------------------------<  98  5.0   |  36<H>  |  1.61<H>    Ca    10.2      24 Sep 2020 10:31  Phos  3.5     09-24  Mg     2.2     09-24      Creatinine Trend: 1.61 <--, 1.82 <--, 1.61 <--, 1.69 <--, 2.03 <--, 1.62 <--, 1.43 <--, 1.35 <--                        8.5    8.61  )-----------( 275      ( 24 Sep 2020 22:27 )             30.2     Urine Studies:  Urinalysis Basic - ( 19 Sep 2020 04:15 )    Color: Light Yellow / Appearance: Clear / S.016 / pH:   Gluc:  / Ketone: Negative  / Bili: Negative / Urobili: <2 mg/dL   Blood:  / Protein: Negative / Nitrite: Negative   Leuk Esterase: Negative / RBC:  / WBC    Sq Epi:  / Non Sq Epi:  / Bacteria:                 PT/INR - ( 23 Sep 2020 07:17 )   PT: 18.9 sec;   INR: 1.63 ratio         PTT - ( 24 Sep 2020 22:27 )  PTT:>200.0 sec            LIVER FUNCTIONS - ( 21 Sep 2020 04:29 )  Alb: 3.6 g/dL / Pro: 7.3 g/dL / ALK PHOS: 98 U/L / ALT: 10 U/L / AST: 17 U/L / GGT: x           PT/INR - ( 22 Sep 2020 06:11 )   PT: 21.6 sec;   INR: 1.87 ratio         PTT - ( 22 Sep 2020 06:11 )  PTT:48.7 sec

## 2020-09-25 LAB
ANION GAP SERPL CALC-SCNC: 12 MMOL/L — SIGNIFICANT CHANGE UP (ref 5–17)
APTT BLD: >200 SEC (ref 27.5–35.5)
APTT BLD: >200 SEC — CRITICAL HIGH (ref 27.5–35.5)
BUN SERPL-MCNC: 70 MG/DL — HIGH (ref 7–23)
CALCIUM SERPL-MCNC: 10.7 MG/DL — HIGH (ref 8.4–10.5)
CHLORIDE SERPL-SCNC: 94 MMOL/L — LOW (ref 96–108)
CO2 SERPL-SCNC: 35 MMOL/L — HIGH (ref 22–31)
CREAT SERPL-MCNC: 1.52 MG/DL — HIGH (ref 0.5–1.3)
GLUCOSE BLDC GLUCOMTR-MCNC: 113 MG/DL — HIGH (ref 70–99)
GLUCOSE SERPL-MCNC: 104 MG/DL — HIGH (ref 70–99)
HCT VFR BLD CALC: 31.4 % — LOW (ref 34.5–45)
HGB BLD-MCNC: 8.7 G/DL — LOW (ref 11.5–15.5)
INR BLD: 1.49 RATIO — HIGH (ref 0.88–1.16)
MCHC RBC-ENTMCNC: 25.6 PG — LOW (ref 27–34)
MCHC RBC-ENTMCNC: 27.7 GM/DL — LOW (ref 32–36)
MCV RBC AUTO: 92.4 FL — SIGNIFICANT CHANGE UP (ref 80–100)
NRBC # BLD: 0 /100 WBCS — SIGNIFICANT CHANGE UP (ref 0–0)
PLATELET # BLD AUTO: 301 K/UL — SIGNIFICANT CHANGE UP (ref 150–400)
POTASSIUM SERPL-MCNC: 4.1 MMOL/L — SIGNIFICANT CHANGE UP (ref 3.5–5.3)
POTASSIUM SERPL-SCNC: 4.1 MMOL/L — SIGNIFICANT CHANGE UP (ref 3.5–5.3)
PROTHROM AB SERPL-ACNC: 17.3 SEC — HIGH (ref 10.6–13.6)
RBC # BLD: 3.4 M/UL — LOW (ref 3.8–5.2)
RBC # FLD: 18.4 % — HIGH (ref 10.3–14.5)
SARS-COV-2 RNA SPEC QL NAA+PROBE: SIGNIFICANT CHANGE UP
SODIUM SERPL-SCNC: 141 MMOL/L — SIGNIFICANT CHANGE UP (ref 135–145)
WBC # BLD: 7.83 K/UL — SIGNIFICANT CHANGE UP (ref 3.8–10.5)
WBC # FLD AUTO: 7.83 K/UL — SIGNIFICANT CHANGE UP (ref 3.8–10.5)

## 2020-09-25 PROCEDURE — 99233 SBSQ HOSP IP/OBS HIGH 50: CPT

## 2020-09-25 PROCEDURE — 99232 SBSQ HOSP IP/OBS MODERATE 35: CPT

## 2020-09-25 RX ORDER — BUMETANIDE 0.25 MG/ML
2 INJECTION INTRAMUSCULAR; INTRAVENOUS EVERY 12 HOURS
Refills: 0 | Status: DISCONTINUED | OUTPATIENT
Start: 2020-09-25 | End: 2020-09-30

## 2020-09-25 RX ORDER — HEPARIN SODIUM 5000 [USP'U]/ML
4000 INJECTION INTRAVENOUS; SUBCUTANEOUS EVERY 6 HOURS
Refills: 0 | Status: DISCONTINUED | OUTPATIENT
Start: 2020-09-25 | End: 2020-10-04

## 2020-09-25 RX ORDER — HEPARIN SODIUM 5000 [USP'U]/ML
1300 INJECTION INTRAVENOUS; SUBCUTANEOUS
Qty: 25000 | Refills: 0 | Status: DISCONTINUED | OUTPATIENT
Start: 2020-09-25 | End: 2020-10-04

## 2020-09-25 RX ORDER — HEPARIN SODIUM 5000 [USP'U]/ML
9000 INJECTION INTRAVENOUS; SUBCUTANEOUS EVERY 6 HOURS
Refills: 0 | Status: DISCONTINUED | OUTPATIENT
Start: 2020-09-25 | End: 2020-10-04

## 2020-09-25 RX ADMIN — Medication 25 MILLIGRAM(S): at 05:44

## 2020-09-25 RX ADMIN — HEPARIN SODIUM 1300 UNIT(S)/HR: 5000 INJECTION INTRAVENOUS; SUBCUTANEOUS at 11:48

## 2020-09-25 RX ADMIN — HEPARIN SODIUM 0 UNIT(S)/HR: 5000 INJECTION INTRAVENOUS; SUBCUTANEOUS at 19:08

## 2020-09-25 RX ADMIN — Medication 3 MILLILITER(S): at 19:09

## 2020-09-25 RX ADMIN — HEPARIN SODIUM 1000 UNIT(S)/HR: 5000 INJECTION INTRAVENOUS; SUBCUTANEOUS at 20:09

## 2020-09-25 RX ADMIN — HEPARIN SODIUM 1600 UNIT(S)/HR: 5000 INJECTION INTRAVENOUS; SUBCUTANEOUS at 10:20

## 2020-09-25 RX ADMIN — Medication 3 MILLILITER(S): at 13:14

## 2020-09-25 RX ADMIN — ATORVASTATIN CALCIUM 40 MILLIGRAM(S): 80 TABLET, FILM COATED ORAL at 21:56

## 2020-09-25 RX ADMIN — PANTOPRAZOLE SODIUM 40 MILLIGRAM(S): 20 TABLET, DELAYED RELEASE ORAL at 05:44

## 2020-09-25 RX ADMIN — Medication 100 MILLIGRAM(S): at 13:14

## 2020-09-25 RX ADMIN — BUMETANIDE 2 MILLIGRAM(S): 0.25 INJECTION INTRAMUSCULAR; INTRAVENOUS at 05:44

## 2020-09-25 RX ADMIN — Medication 25 MILLIGRAM(S): at 19:09

## 2020-09-25 RX ADMIN — Medication 3 MILLILITER(S): at 05:46

## 2020-09-25 NOTE — PROGRESS NOTE ADULT - SUBJECTIVE AND OBJECTIVE BOX
SUBJECTIVE / OVERNIGHT EVENTS: pt denies chest pain, shortness of breath     MEDICATIONS  (STANDING):  albuterol/ipratropium for Nebulization 3 milliLiter(s) Nebulizer every 6 hours  allopurinol 100 milliGRAM(s) Oral daily  atorvastatin 40 milliGRAM(s) Oral at bedtime  buMETAnide 2 milliGRAM(s) Oral every 12 hours  heparin  Infusion. 1300 Unit(s)/Hr (13 mL/Hr) IV Continuous <Continuous>  lidocaine   Patch 1 Patch Transdermal every 24 hours  metoprolol tartrate 25 milliGRAM(s) Oral two times a day  pantoprazole    Tablet 40 milliGRAM(s) Oral before breakfast    MEDICATIONS  (PRN):  acetaminophen   Tablet .. 650 milliGRAM(s) Oral every 6 hours PRN Temp greater or equal to 38.5C (101.3F), Mild Pain (1 - 3)  heparin   Injectable 9000 Unit(s) IV Push every 6 hours PRN For aPTT less than 40  heparin   Injectable 4000 Unit(s) IV Push every 6 hours PRN For aPTT between 40 - 57    Vital Signs Last 24 Hrs  T(C): 36.8 (25 Sep 2020 19:21), Max: 36.9 (25 Sep 2020 05:00)  T(F): 98.2 (25 Sep 2020 19:21), Max: 98.5 (25 Sep 2020 05:00)  HR: 60 (25 Sep 2020 20:55) (57 - 84)  BP: 122/67 (25 Sep 2020 19:21) (122/67 - 134/76)  BP(mean): --  RR: 18 (25 Sep 2020 19:21) (18 - 18)  SpO2: 96% (25 Sep 2020 20:55) (95% - 100%)    Eyes: No recent vision problems or eye pain.  ENT: No congestion, ear pain, or sore throat.  Cardiovascular: No chest pain or palpation.  Respiratory: No cough, shortness of breath, congestion, or wheezing.  Gastrointestinal: No abdominal pain, nausea, vomiting, or diarrhea.  Genitourinary: No dysuria.  Musculoskeletal: No joint swelling.  Neurologic: No headache.    PHYSICAL EXAM:  GENERAL: NAD  EYES: EOMI, PERRLA  NECK: Supple, No JVD  CHEST/LUNG: crackles at bases  HEART:  S1 , S2 +  ABDOMEN: soft , bs+  EXTREMITIES:  edema+  NEUROLOGY:alert awake oriented     LABS:      141  |  94<L>  |  70<H>  ----------------------------<  104<H>  4.1   |  35<H>  |  1.52<H>    Ca    10.7<H>      25 Sep 2020 07:22  Phos  3.5     09-  Mg     2.2     09-24      Creatinine Trend: 1.52 <--, 1.61 <--, 1.82 <--, 1.61 <--, 1.69 <--, 2.03 <--, 1.62 <--                        8.7    7.83  )-----------( 301      ( 25 Sep 2020 07:23 )             31.4     Urine Studies:  Urinalysis Basic - ( 19 Sep 2020 04:15 )    Color: Light Yellow / Appearance: Clear / S.016 / pH:   Gluc:  / Ketone: Negative  / Bili: Negative / Urobili: <2 mg/dL   Blood:  / Protein: Negative / Nitrite: Negative   Leuk Esterase: Negative / RBC:  / WBC    Sq Epi:  / Non Sq Epi:  / Bacteria:                 PT/INR - ( 25 Sep 2020 08:01 )   PT: 17.3 sec;   INR: 1.49 ratio         PTT - ( 25 Sep 2020 17:18 )  PTT:>200.0 sec

## 2020-09-25 NOTE — PROVIDER CONTACT NOTE (CRITICAL VALUE NOTIFICATION) - TEST AND RESULT REPORTED:
Pt reportedly lost 1500ml blood during the operation, with 2u pRBC, 1u plt, 2u FFP given perioperatively. Pt transported to SICU for q1hour neurochecks, and hemodynamic control (with MAPS > 85) and monitoring.   PTT >200 PTT >200

## 2020-09-25 NOTE — PROVIDER CONTACT NOTE (CRITICAL VALUE NOTIFICATION) - BACKGROUND
Tx from [facility] with MR, CHF, cardiac surgical consultation, hx HTN, CHF, MVR, AF on coumadin COPD, CKD

## 2020-09-25 NOTE — PROGRESS NOTE ADULT - SUBJECTIVE AND OBJECTIVE BOX
Interval History:  feels slightly better  still SOB with laying flat  urinating well  d/w patient RHC and Cardiomems which she was interested in learning about    Medications:  acetaminophen   Tablet .. 650 milliGRAM(s) Oral every 6 hours PRN  albuterol/ipratropium for Nebulization 3 milliLiter(s) Nebulizer every 6 hours  allopurinol 100 milliGRAM(s) Oral daily  atorvastatin 40 milliGRAM(s) Oral at bedtime  buMETAnide 2 milliGRAM(s) Oral daily  heparin   Injectable 9000 Unit(s) IV Push every 6 hours PRN  heparin   Injectable 4000 Unit(s) IV Push every 6 hours PRN  heparin  Infusion. 1300 Unit(s)/Hr IV Continuous <Continuous>  lidocaine   Patch 1 Patch Transdermal every 24 hours  metoprolol tartrate 25 milliGRAM(s) Oral two times a day  pantoprazole    Tablet 40 milliGRAM(s) Oral before breakfast      Vitals:  T(C): 36.8 (20 @ 19:21), Max: 36.9 (20 @ 05:00)  HR: 76 (20 @ 19:21) (57 - 84)  BP: 122/67 (20 @ 19:21) (122/67 - 134/76)  BP(mean): --  RR: 18 (20 @ 19:21) (18 - 18)  SpO2: 98% (20 @ 19:21) (95% - 100%)    Daily     Daily Weight in k.6 (25 Sep 2020 07:13)        I&O's Summary    24 Sep 2020 07:01  -  25 Sep 2020 07:00  --------------------------------------------------------  IN: 756 mL / OUT: 2500 mL / NET: -1744 mL    25 Sep 2020 07:  -  25 Sep 2020 21:13  --------------------------------------------------------  IN: 1086 mL / OUT: 850 mL / NET: 236 mL    Physical Exam:  Appearance: No Acute Distress; obese  HEENT: PERRL  Neck: JVD approx 8-10 with v wave  Cardiovascular: Normal S1 S2, mechanical S1  Respiratory: Clear to auscultation bilaterally  Gastrointestinal: Soft, Non-tender	  Skin: No cyanosis	  Neurologic: Non-focal  Extremities: No LE edema  Psychiatry: A & O x 3, Mood & affect appropriate    Labs:                        8.7    7.83  )-----------( 301      ( 25 Sep 2020 07:23 )             31.4     09-25    141  |  94<L>  |  70<H>  ----------------------------<  104<H>  4.1   |  35<H>  |  1.52<H>    Ca    10.7<H>      25 Sep 2020 07:22  Phos  3.5     09-24  Mg     2.2     09-24      PT/INR - ( 25 Sep 2020 08:01 )   PT: 17.3 sec;   INR: 1.49 ratio         PTT - ( 25 Sep 2020 17:18 )  PTT:>200.0 sec

## 2020-09-25 NOTE — PROGRESS NOTE ADULT - PROBLEM SELECTOR PLAN 3
- Currently in NSR  - continue BB as below  - Coumadin on hold. c/w heparin gtt for mechanical valve

## 2020-09-25 NOTE — PROVIDER CONTACT NOTE (CRITICAL VALUE NOTIFICATION) - BACKGROUND
Tx from [facility] with MR, CHF, cardiac surgical consultation, hx HTN, CHF, MVR, AF on coumadin COPD, CKD

## 2020-09-25 NOTE — PROGRESS NOTE ADULT - PROBLEM SELECTOR PLAN 1
- may benefit from RHC/MEMs given multiple admissions in the past   - increase bumex 2 mg daily PO twice/day   - daily standing weights and strict I&Os.  - will review TTE and consider ELIZABETH to further assess given poor windows

## 2020-09-25 NOTE — PROGRESS NOTE ADULT - ASSESSMENT
Ms. Alcantara is a sandra 70 year old woman with HFpEF, HTN (diagnosed in 30's and reportedly well controlled with medications), severe MR s/p mechanical MVR 1999 ([facility] with Dr. Anderson) and severe TR s/p TVR 2012 ([facility]), ?COPD (home 02; no prior tobacco use), AFib on Coumadin (s/p ablation), PHTN, CKD 3 (b/l SCr ~1.3-1.6), MIGUEL and gout. Followed by Dr. Duarte (cardiologist) who presented to [facility] with SOB and chest tightness transferred on 8/18 for possible mitral intervention for possible mitral stenosis with Dr. Fernandez. Diuretics were held due to worsening renal dysfunction. ABG was consistent with hypercarbia and she reports previously needing Bipap but hasn't used in 10 years. TTE performed with normal LV function with limited views to assess mitral valve however gradients were within normal limits. Suspect she has a component of acute on chronic HFpEF as well as OHS/MIGUEL.

## 2020-09-26 LAB
ANION GAP SERPL CALC-SCNC: 9 MMOL/L — SIGNIFICANT CHANGE UP (ref 5–17)
APTT BLD: 149.8 SEC — CRITICAL HIGH (ref 27.5–35.5)
APTT BLD: 58.1 SEC — HIGH (ref 27.5–35.5)
APTT BLD: 69.7 SEC — HIGH (ref 27.5–35.5)
BUN SERPL-MCNC: 62 MG/DL — HIGH (ref 7–23)
CALCIUM SERPL-MCNC: 10.4 MG/DL — SIGNIFICANT CHANGE UP (ref 8.4–10.5)
CHLORIDE SERPL-SCNC: 94 MMOL/L — LOW (ref 96–108)
CO2 SERPL-SCNC: 35 MMOL/L — HIGH (ref 22–31)
CREAT SERPL-MCNC: 1.46 MG/DL — HIGH (ref 0.5–1.3)
GLUCOSE SERPL-MCNC: 146 MG/DL — HIGH (ref 70–99)
HCT VFR BLD CALC: 29.7 % — LOW (ref 34.5–45)
HGB BLD-MCNC: 8.5 G/DL — LOW (ref 11.5–15.5)
MAGNESIUM SERPL-MCNC: 2 MG/DL — SIGNIFICANT CHANGE UP (ref 1.6–2.6)
MCHC RBC-ENTMCNC: 25.8 PG — LOW (ref 27–34)
MCHC RBC-ENTMCNC: 28.6 GM/DL — LOW (ref 32–36)
MCV RBC AUTO: 90.3 FL — SIGNIFICANT CHANGE UP (ref 80–100)
NRBC # BLD: 0 /100 WBCS — SIGNIFICANT CHANGE UP (ref 0–0)
PHOSPHATE SERPL-MCNC: 3 MG/DL — SIGNIFICANT CHANGE UP (ref 2.5–4.5)
PLATELET # BLD AUTO: 277 K/UL — SIGNIFICANT CHANGE UP (ref 150–400)
POTASSIUM SERPL-MCNC: 4.4 MMOL/L — SIGNIFICANT CHANGE UP (ref 3.5–5.3)
POTASSIUM SERPL-SCNC: 4.4 MMOL/L — SIGNIFICANT CHANGE UP (ref 3.5–5.3)
RBC # BLD: 3.29 M/UL — LOW (ref 3.8–5.2)
RBC # FLD: 18.5 % — HIGH (ref 10.3–14.5)
SODIUM SERPL-SCNC: 138 MMOL/L — SIGNIFICANT CHANGE UP (ref 135–145)
WBC # BLD: 7.1 K/UL — SIGNIFICANT CHANGE UP (ref 3.8–10.5)
WBC # FLD AUTO: 7.1 K/UL — SIGNIFICANT CHANGE UP (ref 3.8–10.5)

## 2020-09-26 RX ADMIN — HEPARIN SODIUM 700 UNIT(S)/HR: 5000 INJECTION INTRAVENOUS; SUBCUTANEOUS at 10:38

## 2020-09-26 RX ADMIN — Medication 25 MILLIGRAM(S): at 18:07

## 2020-09-26 RX ADMIN — PANTOPRAZOLE SODIUM 40 MILLIGRAM(S): 20 TABLET, DELAYED RELEASE ORAL at 05:28

## 2020-09-26 RX ADMIN — Medication 100 MILLIGRAM(S): at 14:30

## 2020-09-26 RX ADMIN — HEPARIN SODIUM 0 UNIT(S)/HR: 5000 INJECTION INTRAVENOUS; SUBCUTANEOUS at 02:57

## 2020-09-26 RX ADMIN — Medication 3 MILLILITER(S): at 05:37

## 2020-09-26 RX ADMIN — BUMETANIDE 2 MILLIGRAM(S): 0.25 INJECTION INTRAMUSCULAR; INTRAVENOUS at 18:07

## 2020-09-26 RX ADMIN — Medication 3 MILLILITER(S): at 18:07

## 2020-09-26 RX ADMIN — HEPARIN SODIUM 700 UNIT(S)/HR: 5000 INJECTION INTRAVENOUS; SUBCUTANEOUS at 18:07

## 2020-09-26 RX ADMIN — Medication 25 MILLIGRAM(S): at 05:28

## 2020-09-26 RX ADMIN — BUMETANIDE 2 MILLIGRAM(S): 0.25 INJECTION INTRAMUSCULAR; INTRAVENOUS at 05:28

## 2020-09-26 RX ADMIN — Medication 3 MILLILITER(S): at 14:30

## 2020-09-26 RX ADMIN — ATORVASTATIN CALCIUM 40 MILLIGRAM(S): 80 TABLET, FILM COATED ORAL at 21:04

## 2020-09-26 RX ADMIN — HEPARIN SODIUM 700 UNIT(S)/HR: 5000 INJECTION INTRAVENOUS; SUBCUTANEOUS at 04:00

## 2020-09-26 NOTE — PROGRESS NOTE ADULT - SUBJECTIVE AND OBJECTIVE BOX
SUBJECTIVE / OVERNIGHT EVENTS: pt denies chest pain, shortness of breath     MEDICATIONS  (STANDING):  albuterol/ipratropium for Nebulization 3 milliLiter(s) Nebulizer every 6 hours  allopurinol 100 milliGRAM(s) Oral daily  atorvastatin 40 milliGRAM(s) Oral at bedtime  buMETAnide 2 milliGRAM(s) Oral every 12 hours  heparin  Infusion. 1300 Unit(s)/Hr (13 mL/Hr) IV Continuous <Continuous>  lidocaine   Patch 1 Patch Transdermal every 24 hours  metoprolol tartrate 25 milliGRAM(s) Oral two times a day  pantoprazole    Tablet 40 milliGRAM(s) Oral before breakfast    MEDICATIONS  (PRN):  acetaminophen   Tablet .. 650 milliGRAM(s) Oral every 6 hours PRN Temp greater or equal to 38.5C (101.3F), Mild Pain (1 - 3)  heparin   Injectable 9000 Unit(s) IV Push every 6 hours PRN For aPTT less than 40  heparin   Injectable 4000 Unit(s) IV Push every 6 hours PRN For aPTT between 40 - 57    Vital Signs Last 24 Hrs  T(C): 36.3 (26 Sep 2020 20:43), Max: 37.2 (26 Sep 2020 20:39)  T(F): 97.4 (26 Sep 2020 20:43), Max: 98.9 (26 Sep 2020 20:39)  HR: 60 (26 Sep 2020 21:20) (57 - 80)  BP: 133/75 (26 Sep 2020 20:43) (126/71 - 144/83)  BP(mean): 103 (26 Sep 2020 04:26) (103 - 103)  RR: 19 (26 Sep 2020 20:43) (18 - 19)  SpO2: 95% (26 Sep 2020 21:20) (93% - 100%)    Eyes: No recent vision problems or eye pain.  ENT: No congestion, ear pain, or sore throat.  Cardiovascular: No chest pain or palpation.  Respiratory: No cough, shortness of breath, congestion, or wheezing.  Gastrointestinal: No abdominal pain, nausea, vomiting, or diarrhea.  Genitourinary: No dysuria.  Musculoskeletal: No joint swelling.  Neurologic: No headache.    PHYSICAL EXAM:  GENERAL: NAD  EYES: EOMI, PERRLA  NECK: Supple, No JVD  CHEST/LUNG: crackles at bases  HEART:  S1 , S2 +  ABDOMEN: soft , bs+  EXTREMITIES:  edema+  NEUROLOGY:alert awake oriented     LABS:  09-26    138  |  94<L>  |  62<H>  ----------------------------<  146<H>  4.4   |  35<H>  |  1.46<H>    Ca    10.4      26 Sep 2020 09:40  Phos  3.0     09-26  Mg     2.0     09-26      Creatinine Trend: 1.46 <--, 1.52 <--, 1.61 <--, 1.82 <--, 1.61 <--, 1.69 <--, 2.03 <--                        8.5    7.10  )-----------( 277      ( 26 Sep 2020 09:40 )             29.7     Urine Studies:              PT/INR - ( 25 Sep 2020 08:01 )   PT: 17.3 sec;   INR: 1.49 ratio         PTT - ( 26 Sep 2020 17:17 )  PTT:58.1 sec

## 2020-09-27 LAB
ANION GAP SERPL CALC-SCNC: 12 MMOL/L — SIGNIFICANT CHANGE UP (ref 5–17)
APTT BLD: 37.7 SEC — HIGH (ref 27.5–35.5)
APTT BLD: 87.8 SEC — HIGH (ref 27.5–35.5)
BUN SERPL-MCNC: 64 MG/DL — HIGH (ref 7–23)
CALCIUM SERPL-MCNC: 10.6 MG/DL — HIGH (ref 8.4–10.5)
CHLORIDE SERPL-SCNC: 95 MMOL/L — LOW (ref 96–108)
CO2 SERPL-SCNC: 33 MMOL/L — HIGH (ref 22–31)
CREAT SERPL-MCNC: 1.5 MG/DL — HIGH (ref 0.5–1.3)
GLUCOSE SERPL-MCNC: 99 MG/DL — SIGNIFICANT CHANGE UP (ref 70–99)
HCT VFR BLD CALC: 29.7 % — LOW (ref 34.5–45)
HGB BLD-MCNC: 8.5 G/DL — LOW (ref 11.5–15.5)
MCHC RBC-ENTMCNC: 26 PG — LOW (ref 27–34)
MCHC RBC-ENTMCNC: 28.6 GM/DL — LOW (ref 32–36)
MCV RBC AUTO: 90.8 FL — SIGNIFICANT CHANGE UP (ref 80–100)
NRBC # BLD: 0 /100 WBCS — SIGNIFICANT CHANGE UP (ref 0–0)
PLATELET # BLD AUTO: 278 K/UL — SIGNIFICANT CHANGE UP (ref 150–400)
POTASSIUM SERPL-MCNC: 4.7 MMOL/L — SIGNIFICANT CHANGE UP (ref 3.5–5.3)
POTASSIUM SERPL-SCNC: 4.7 MMOL/L — SIGNIFICANT CHANGE UP (ref 3.5–5.3)
RBC # BLD: 3.27 M/UL — LOW (ref 3.8–5.2)
RBC # FLD: 18.5 % — HIGH (ref 10.3–14.5)
SODIUM SERPL-SCNC: 140 MMOL/L — SIGNIFICANT CHANGE UP (ref 135–145)
WBC # BLD: 6.85 K/UL — SIGNIFICANT CHANGE UP (ref 3.8–10.5)
WBC # FLD AUTO: 6.85 K/UL — SIGNIFICANT CHANGE UP (ref 3.8–10.5)

## 2020-09-27 PROCEDURE — 99233 SBSQ HOSP IP/OBS HIGH 50: CPT

## 2020-09-27 RX ADMIN — HEPARIN SODIUM 1200 UNIT(S)/HR: 5000 INJECTION INTRAVENOUS; SUBCUTANEOUS at 18:41

## 2020-09-27 RX ADMIN — Medication 3 MILLILITER(S): at 14:29

## 2020-09-27 RX ADMIN — Medication 25 MILLIGRAM(S): at 18:42

## 2020-09-27 RX ADMIN — Medication 25 MILLIGRAM(S): at 06:03

## 2020-09-27 RX ADMIN — Medication 3 MILLILITER(S): at 18:42

## 2020-09-27 RX ADMIN — Medication 3 MILLILITER(S): at 06:03

## 2020-09-27 RX ADMIN — PANTOPRAZOLE SODIUM 40 MILLIGRAM(S): 20 TABLET, DELAYED RELEASE ORAL at 06:02

## 2020-09-27 RX ADMIN — BUMETANIDE 2 MILLIGRAM(S): 0.25 INJECTION INTRAMUSCULAR; INTRAVENOUS at 18:42

## 2020-09-27 RX ADMIN — ATORVASTATIN CALCIUM 40 MILLIGRAM(S): 80 TABLET, FILM COATED ORAL at 21:19

## 2020-09-27 RX ADMIN — BUMETANIDE 2 MILLIGRAM(S): 0.25 INJECTION INTRAMUSCULAR; INTRAVENOUS at 06:03

## 2020-09-27 RX ADMIN — Medication 100 MILLIGRAM(S): at 14:28

## 2020-09-27 RX ADMIN — HEPARIN SODIUM 1200 UNIT(S)/HR: 5000 INJECTION INTRAVENOUS; SUBCUTANEOUS at 09:39

## 2020-09-27 NOTE — PROGRESS NOTE ADULT - ASSESSMENT
Ms. Alcantara is a sandra 70 year old woman with HFpEF, HTN (diagnosed in 30's and reportedly well controlled with medications), severe MR s/p mechanical MVR 1999 ([facility] with Dr. Anderson) and severe TR s/p TVR 2012 ([facility]), ?COPD (home 02; no prior tobacco use), AFib on Coumadin (s/p ablation), PHTN, CKD 3 (b/l SCr ~1.3-1.6), MIGUEL and gout. Followed by Dr. Duarte (cardiologist) who presented to [facility] with SOB and chest tightness transferred on 8/18 for possible mitral intervention for possible mitral stenosis with Dr. Fernandez. Diuretics were held due to worsening renal dysfunction. ABG was consistent with hypercarbia and she reports previously needing Bipap but hasn't used in 10 years. TTE performed with normal LV function with limited views to assess mitral valve however gradients were within normal limits. Suspect she has a component of acute on chronic HFpEF as well as OHS/MIGUEL.

## 2020-09-27 NOTE — PROGRESS NOTE ADULT - SUBJECTIVE AND OBJECTIVE BOX
Interval History:  feels well  still reports orthopnea/PND  hesitant about MEMs as she does not believe she'll be able to do at home but agreeable to [facility]    Medications:  acetaminophen   Tablet .. 650 milliGRAM(s) Oral every 6 hours PRN  albuterol/ipratropium for Nebulization 3 milliLiter(s) Nebulizer every 6 hours  allopurinol 100 milliGRAM(s) Oral daily  atorvastatin 40 milliGRAM(s) Oral at bedtime  buMETAnide 2 milliGRAM(s) Oral every 12 hours  heparin   Injectable 9000 Unit(s) IV Push every 6 hours PRN  heparin   Injectable 4000 Unit(s) IV Push every 6 hours PRN  heparin  Infusion. 1300 Unit(s)/Hr IV Continuous <Continuous>  lidocaine   Patch 1 Patch Transdermal every 24 hours  metoprolol tartrate 25 milliGRAM(s) Oral two times a day  pantoprazole    Tablet 40 milliGRAM(s) Oral before breakfast      Vitals:  T(C): 36.8 (20 @ 11:51), Max: 37.2 (20 @ 20:39)  HR: 70 (20 @ 16:00) (60 - 81)  BP: 127/73 (20 @ 11:51) (123/70 - 133/75)  BP(mean): 88 (20 @ 04:58) (88 - 88)  RR: 18 (20 @ 11:51) (18 - 19)  SpO2: 96% (20 @ 16:00) (93% - 98%)    Daily     Daily Weight in k.6 (27 Sep 2020 07:00)        I&O's Summary    26 Sep 2020 07:  -  27 Sep 2020 07:00  --------------------------------------------------------  IN: 808 mL / OUT: 0 mL / NET: 808 mL    27 Sep 2020 07:  -  27 Sep 2020 17:38  --------------------------------------------------------  IN: 440 mL / OUT: 650 mL / NET: -210 mL        Physical Exam:  Appearance: No Acute Distress; obese  HEENT: PERRL  Neck: JVD approx 8-10 with HJR; v waves  Cardiovascular: Normal S1 S2, mechanical S1  Respiratory: Clear to auscultation bilaterally  Gastrointestinal: Soft, Non-tender	  Skin: No cyanosis	  Neurologic: Non-focal  Extremities: No LE edema  Psychiatry: A & O x 3, Mood & affect appropriate    Labs:                        8.5    6.85  )-----------( 278      ( 27 Sep 2020 06:02 )             29.7     09-27    140  |  95<L>  |  64<H>  ----------------------------<  99  4.7   |  33<H>  |  1.50<H>    Ca    10.6<H>      27 Sep 2020 06:02  Phos  3.0     09-  Mg     2.0     09-      PTT - ( 27 Sep 2020 15:48 )  PTT:87.8 sec        TELEMETRY:    Echocardiogram:

## 2020-09-27 NOTE — PROGRESS NOTE ADULT - PROBLEM SELECTOR PLAN 1
- may benefit from RHC/MEMs given multiple admissions in the past; hesitant about MEMs but will arrange RHC possibly Monday or Tuesday  - c/w bumex 2 mg PO twice/day   - daily standing weights and strict I&Os.  - may consider [facility] if RHC concerning as TTE with limited windows

## 2020-09-27 NOTE — PROGRESS NOTE ADULT - SUBJECTIVE AND OBJECTIVE BOX
SUBJECTIVE / OVERNIGHT EVENTS: pt denies chest pain, shortness of breath     MEDICATIONS  (STANDING):  albuterol/ipratropium for Nebulization 3 milliLiter(s) Nebulizer every 6 hours  allopurinol 100 milliGRAM(s) Oral daily  atorvastatin 40 milliGRAM(s) Oral at bedtime  buMETAnide 2 milliGRAM(s) Oral every 12 hours  heparin  Infusion. 1300 Unit(s)/Hr (13 mL/Hr) IV Continuous <Continuous>  lidocaine   Patch 1 Patch Transdermal every 24 hours  metoprolol tartrate 25 milliGRAM(s) Oral two times a day  pantoprazole    Tablet 40 milliGRAM(s) Oral before breakfast    MEDICATIONS  (PRN):  acetaminophen   Tablet .. 650 milliGRAM(s) Oral every 6 hours PRN Temp greater or equal to 38.5C (101.3F), Mild Pain (1 - 3)  heparin   Injectable 9000 Unit(s) IV Push every 6 hours PRN For aPTT less than 40  heparin   Injectable 4000 Unit(s) IV Push every 6 hours PRN For aPTT between 40 - 57    Vital Signs Last 24 Hrs  T(C): 36.8 (27 Sep 2020 19:49), Max: 36.8 (27 Sep 2020 11:51)  T(F): 98.2 (27 Sep 2020 19:49), Max: 98.3 (27 Sep 2020 11:51)  HR: 73 (27 Sep 2020 19:49) (62 - 81)  BP: 123/75 (27 Sep 2020 19:49) (123/70 - 127/73)  BP(mean): 88 (27 Sep 2020 04:58) (88 - 88)  RR: 18 (27 Sep 2020 19:49) (18 - 18)  SpO2: 95% (27 Sep 2020 19:49) (95% - 98%)    Eyes: No recent vision problems or eye pain.  ENT: No congestion, ear pain, or sore throat.  Cardiovascular: No chest pain or palpation.  Respiratory: No cough, shortness of breath, congestion, or wheezing.  Gastrointestinal: No abdominal pain, nausea, vomiting, or diarrhea.  Genitourinary: No dysuria.  Musculoskeletal: No joint swelling.  Neurologic: No headache.    PHYSICAL EXAM:  GENERAL: NAD  EYES: EOMI, PERRLA  NECK: Supple, No JVD  CHEST/LUNG: crackles at bases  HEART:  S1 , S2 +  ABDOMEN: soft , bs+  EXTREMITIES:  edema+  NEUROLOGY:alert awake oriented     LABS:  09-27    140  |  95<L>  |  64<H>  ----------------------------<  99  4.7   |  33<H>  |  1.50<H>    Ca    10.6<H>      27 Sep 2020 06:02  Phos  3.0     09-26  Mg     2.0     09-26      Creatinine Trend: 1.50 <--, 1.46 <--, 1.52 <--, 1.61 <--, 1.82 <--, 1.61 <--, 1.69 <--                        8.5    6.85  )-----------( 278      ( 27 Sep 2020 06:02 )             29.7     Urine Studies:              PTT - ( 27 Sep 2020 15:48 )  PTT:87.8 sec

## 2020-09-28 LAB
ANION GAP SERPL CALC-SCNC: 10 MMOL/L — SIGNIFICANT CHANGE UP (ref 5–17)
APTT BLD: 92.1 SEC — HIGH (ref 27.5–35.5)
BUN SERPL-MCNC: 66 MG/DL — HIGH (ref 7–23)
CALCIUM SERPL-MCNC: 10.7 MG/DL — HIGH (ref 8.4–10.5)
CHLORIDE SERPL-SCNC: 92 MMOL/L — LOW (ref 96–108)
CO2 SERPL-SCNC: 35 MMOL/L — HIGH (ref 22–31)
CREAT SERPL-MCNC: 1.57 MG/DL — HIGH (ref 0.5–1.3)
GLUCOSE SERPL-MCNC: 110 MG/DL — HIGH (ref 70–99)
HCT VFR BLD CALC: 29 % — LOW (ref 34.5–45)
HGB BLD-MCNC: 8.2 G/DL — LOW (ref 11.5–15.5)
MCHC RBC-ENTMCNC: 25.5 PG — LOW (ref 27–34)
MCHC RBC-ENTMCNC: 28.3 GM/DL — LOW (ref 32–36)
MCV RBC AUTO: 90.1 FL — SIGNIFICANT CHANGE UP (ref 80–100)
NRBC # BLD: 0 /100 WBCS — SIGNIFICANT CHANGE UP (ref 0–0)
PLATELET # BLD AUTO: 286 K/UL — SIGNIFICANT CHANGE UP (ref 150–400)
POTASSIUM SERPL-MCNC: 3.9 MMOL/L — SIGNIFICANT CHANGE UP (ref 3.5–5.3)
POTASSIUM SERPL-SCNC: 3.9 MMOL/L — SIGNIFICANT CHANGE UP (ref 3.5–5.3)
RBC # BLD: 3.22 M/UL — LOW (ref 3.8–5.2)
RBC # FLD: 18.6 % — HIGH (ref 10.3–14.5)
SODIUM SERPL-SCNC: 137 MMOL/L — SIGNIFICANT CHANGE UP (ref 135–145)
WBC # BLD: 7.58 K/UL — SIGNIFICANT CHANGE UP (ref 3.8–10.5)
WBC # FLD AUTO: 7.58 K/UL — SIGNIFICANT CHANGE UP (ref 3.8–10.5)

## 2020-09-28 PROCEDURE — 99232 SBSQ HOSP IP/OBS MODERATE 35: CPT

## 2020-09-28 PROCEDURE — 99233 SBSQ HOSP IP/OBS HIGH 50: CPT

## 2020-09-28 RX ADMIN — Medication 3 MILLILITER(S): at 17:58

## 2020-09-28 RX ADMIN — Medication 3 MILLILITER(S): at 12:15

## 2020-09-28 RX ADMIN — Medication 100 MILLIGRAM(S): at 12:14

## 2020-09-28 RX ADMIN — Medication 3 MILLILITER(S): at 23:15

## 2020-09-28 RX ADMIN — BUMETANIDE 2 MILLIGRAM(S): 0.25 INJECTION INTRAMUSCULAR; INTRAVENOUS at 05:04

## 2020-09-28 RX ADMIN — Medication 3 MILLILITER(S): at 00:33

## 2020-09-28 RX ADMIN — PANTOPRAZOLE SODIUM 40 MILLIGRAM(S): 20 TABLET, DELAYED RELEASE ORAL at 05:04

## 2020-09-28 RX ADMIN — HEPARIN SODIUM 1200 UNIT(S)/HR: 5000 INJECTION INTRAVENOUS; SUBCUTANEOUS at 01:03

## 2020-09-28 RX ADMIN — Medication 25 MILLIGRAM(S): at 17:58

## 2020-09-28 RX ADMIN — BUMETANIDE 2 MILLIGRAM(S): 0.25 INJECTION INTRAMUSCULAR; INTRAVENOUS at 17:58

## 2020-09-28 RX ADMIN — Medication 25 MILLIGRAM(S): at 05:04

## 2020-09-28 RX ADMIN — ATORVASTATIN CALCIUM 40 MILLIGRAM(S): 80 TABLET, FILM COATED ORAL at 21:32

## 2020-09-28 RX ADMIN — Medication 3 MILLILITER(S): at 05:04

## 2020-09-28 NOTE — CHART NOTE - NSCHARTNOTEFT_GEN_A_CORE
Pt seen for LOS follow-up on 2COH.    Pt is a 70 year old female with PMH HTN, CHF, COPD valvular heart disease MVR for mitral stenosis, TVR '12 now has severe TR and HTN transferred for heart failure optimization. Pt on diuretic. Of note, pt's Hba1c: 5.8%, within pre-DM range.    Source: Patient [x]    Family [ ]     other [x] EMR    Diet: Consistent CHO Renal No Snacks. Ensure Enlive x 2 daily (700 kcal, 40 grams protein)    Patient reports [ ] nausea  [ ] vomiting [ ] diarrhea [ ] constipation  [ ]chewing problems [ ] swallowing issues  [ ] other:     PO intake:  < 50% [ ] 50-75% [x]   % [ ]  other :    Source for PO intake [x] Patient [ ] family [x] chart [ ] staff [ ] other    Enteral/Parenteral Nutrition: n/a    Current Weight: 241.1 pounds (current, standing, +2 B/L leg edema noted).      Pertinent Medications: MEDICATIONS  (STANDING):  albuterol/ipratropium for Nebulization 3 milliLiter(s) Nebulizer every 6 hours  allopurinol 100 milliGRAM(s) Oral daily  atorvastatin 40 milliGRAM(s) Oral at bedtime  buMETAnide 2 milliGRAM(s) Oral every 12 hours  heparin  Infusion. 1300 Unit(s)/Hr (13 mL/Hr) IV Continuous <Continuous>  lidocaine   Patch 1 Patch Transdermal every 24 hours  metoprolol tartrate 25 milliGRAM(s) Oral two times a day  pantoprazole    Tablet 40 milliGRAM(s) Oral before breakfast    MEDICATIONS  (PRN):  acetaminophen   Tablet .. 650 milliGRAM(s) Oral every 6 hours PRN Temp greater or equal to 38.5C (101.3F), Mild Pain (1 - 3)  heparin   Injectable 9000 Unit(s) IV Push every 6 hours PRN For aPTT less than 40  heparin   Injectable 4000 Unit(s) IV Push every 6 hours PRN For aPTT between 40 - 57    Pertinent Labs:  09-28 Na137 mmol/L Glu 110 mg/dL<H> K+ 3.9 mmol/L Cr  1.57 mg/dL<H> BUN 66 mg/dL<H> 09-26 Phos 3.0 mg/dL      Skin: No pressure injuries noted      Estimated Needs:   [x] no change since previous assessment  [ ] recalculated:       Previous Nutrition Diagnosis: Overweight/Obesity         Nutrition Diagnosis is [x] ongoing  [ ] resolved [ ] not applicable          New Nutrition Diagnosis: [x] not applicable         Interventions:     1)        Monitoring and Evaluation:     [x] PO intake [x] Tolerance to diet prescription [x] weights [x] follow up per protocol    [x] other: RD remains available: Linda Roque MS, RDN, CDN, CDE, CSOWM. #[phone] Pt seen for LOS follow-up on 2COH. Pt alert today, reports she is eating fair/about 50% of meals, voiced new food preferences. Pt receiving Ensure supplements, reports she would like just one container daily.    Pt is a 70 year old female with PMH HTN, CHF, COPD valvular heart disease MVR for mitral stenosis, TVR '12 now has severe TR and HTN transferred for heart failure optimization. Pt on diuretic. Of note, pt's Hba1c: 5.8%, within pre-DM range.    Source: Patient [x]    Family [ ]     other [x] EMR    Diet: Consistent CHO Renal No Snacks. Ensure Enlive x 2 daily (700 kcal, 40 grams protein)    Patient reports [ ] nausea  [ ] vomiting [ ] diarrhea [ ] constipation  [ ]chewing problems [ ] swallowing issues  [ ] other: Reduced appetite    PO intake (meals):  < 50% [ ] 50-75% [x]   % [ ]  other :    Source for PO intake [x] Patient [ ] family [x] chart [ ] staff [ ] other    Enteral/Parenteral Nutrition: n/a    Current Weight: 241.1 pounds (current, standing, +2 B/L leg edema noted). 239 pounds admit wt.     Pertinent Medications: MEDICATIONS  (STANDING):  albuterol/ipratropium for Nebulization 3 milliLiter(s) Nebulizer every 6 hours  allopurinol 100 milliGRAM(s) Oral daily  atorvastatin 40 milliGRAM(s) Oral at bedtime  buMETAnide 2 milliGRAM(s) Oral every 12 hours  heparin  Infusion. 1300 Unit(s)/Hr (13 mL/Hr) IV Continuous <Continuous>  lidocaine   Patch 1 Patch Transdermal every 24 hours  metoprolol tartrate 25 milliGRAM(s) Oral two times a day  pantoprazole    Tablet 40 milliGRAM(s) Oral before breakfast    MEDICATIONS  (PRN):  acetaminophen   Tablet .. 650 milliGRAM(s) Oral every 6 hours PRN Temp greater or equal to 38.5C (101.3F), Mild Pain (1 - 3)  heparin   Injectable 9000 Unit(s) IV Push every 6 hours PRN For aPTT less than 40  heparin   Injectable 4000 Unit(s) IV Push every 6 hours PRN For aPTT between 40 - 57    Pertinent Labs:  09-28 Na137 mmol/L Glu 110 mg/dL<H> K+ 3.9 mmol/L Cr  1.57 mg/dL<H> BUN 66 mg/dL<H> 09-26 Phos 3.0 mg/dL      Skin: No pressure injuries noted      Estimated Needs:   [x] no change since previous assessment  [ ] recalculated:       Previous Nutrition Diagnosis: Overweight/Obesity         Nutrition Diagnosis is [x] ongoing  [ ] resolved [ ] not applicable          New Nutrition Diagnosis: [x] not applicable         Interventions:     1) Recommend continue current diet order to promote glycemic control and in context of CKD.  -Will allow mashed potato at lunch daily per pt request  2) Recommend reduce Ensure Enlive to 1x daily per pt request  3) Reviewed wt loss, healthful diet and physical activity (per MD discretion) recommendations with pt in setting of elevated HbA1c, obesity, heart disease. Pt voiced understanding.        Monitoring and Evaluation:     [x] PO intake [x] Tolerance to diet prescription [x] weights [x] follow up per protocol    [x] other: RD remains available: Linda Roque MS, RDN, CDN, CDE, CSOWM. #[phone]

## 2020-09-28 NOTE — PROGRESS NOTE ADULT - SUBJECTIVE AND OBJECTIVE BOX
Subjective: She complains of ongoing shortness of breath with minimal exertion, although she notes some improvement since the beginning of her hospitalization. She is not dizzy.     Medications:  acetaminophen   Tablet .. 650 milliGRAM(s) Oral every 6 hours PRN  albuterol/ipratropium for Nebulization 3 milliLiter(s) Nebulizer every 6 hours  allopurinol 100 milliGRAM(s) Oral daily  atorvastatin 40 milliGRAM(s) Oral at bedtime  buMETAnide 2 milliGRAM(s) Oral every 12 hours  heparin   Injectable 9000 Unit(s) IV Push every 6 hours PRN  heparin   Injectable 4000 Unit(s) IV Push every 6 hours PRN  heparin  Infusion. 1300 Unit(s)/Hr IV Continuous <Continuous>  lidocaine   Patch 1 Patch Transdermal every 24 hours  metoprolol tartrate 25 milliGRAM(s) Oral two times a day  pantoprazole    Tablet 40 milliGRAM(s) Oral before breakfast      Physical Exam:    Vital Signs Last 24 Hrs  T(C): 36.9 (28 Sep 2020 11:54), Max: 36.9 (28 Sep 2020 11:54)  T(F): 98.4 (28 Sep 2020 11:54), Max: 98.4 (28 Sep 2020 11:54)  HR: 73 (28 Sep 2020 11:54) (64 - 73)  BP: 119/65 (28 Sep 2020 11:54) (119/65 - 137/64)  RR: 18 (28 Sep 2020 11:54) (18 - 18)  SpO2: 100% (28 Sep 2020 11:54) (95% - 100%)    Daily     Daily Weight in k.5 (28 Sep 2020 08:28)    I&O's Summary    27 Sep 2020 07:  -  28 Sep 2020 07:00  --------------------------------------------------------  IN: 1151 mL / OUT: 2225 mL / NET: -1074 mL    28 Sep 2020 07:  -  28 Sep 2020 13:15  --------------------------------------------------------  IN: 460 mL / OUT: 225 mL / NET: 235 mL    General: No distress. Comfortable.  HEENT: EOM intact.  Neck: Neck supple. JVP moderately elevated with positive HJR and prominent V waves. No masses  Chest: Clear to auscultation bilaterally  CV: RRR with mechanical S1 and normal S2. No rubs or gallops. Radial pulses normal. Edema around ankles.   Abdomen: Soft, non-distended, non-tender  Skin: No rashes or skin breakdown  Neurology: Alert and oriented times three. Sensation intact  Psych: Affect tearful.     Labs:                        8.2    7.58  )-----------( 286      ( 28 Sep 2020 06:55 )             29.0     09-    137  |  92<L>  |  66<H>  ----------------------------<  110<H>  3.9   |  35<H>  |  1.57<H>    Ca    10.7<H>      28 Sep 2020 06:54    PTT - ( 28 Sep 2020 00:41 )  PTT:92.1 sec no loss of consciousness, no gait abnormality, no headache, no sensory deficits, and no weakness.

## 2020-09-28 NOTE — PROGRESS NOTE ADULT - SUBJECTIVE AND OBJECTIVE BOX
PULMONARY PROGRESS NOTE    DAMARI GUERRERO  MRN-[id number]    Patient is a 70y old  Female who presents with a chief complaint of mitral regurgitation, heart failure (27 Sep 2020 17:38)      HPI:  -  -    ROS:   -    ACTIVE MEDICATION LIST:  MEDICATIONS  (STANDING):  albuterol/ipratropium for Nebulization 3 milliLiter(s) Nebulizer every 6 hours  allopurinol 100 milliGRAM(s) Oral daily  atorvastatin 40 milliGRAM(s) Oral at bedtime  buMETAnide 2 milliGRAM(s) Oral every 12 hours  heparin  Infusion. 1300 Unit(s)/Hr (13 mL/Hr) IV Continuous <Continuous>  lidocaine   Patch 1 Patch Transdermal every 24 hours  metoprolol tartrate 25 milliGRAM(s) Oral two times a day  pantoprazole    Tablet 40 milliGRAM(s) Oral before breakfast    MEDICATIONS  (PRN):  acetaminophen   Tablet .. 650 milliGRAM(s) Oral every 6 hours PRN Temp greater or equal to 38.5C (101.3F), Mild Pain (1 - 3)  heparin   Injectable 9000 Unit(s) IV Push every 6 hours PRN For aPTT less than 40  heparin   Injectable 4000 Unit(s) IV Push every 6 hours PRN For aPTT between 40 - 57      EXAM:  Vital Signs Last 24 Hrs  T(C): 36.7 (28 Sep 2020 05:00), Max: 36.8 (27 Sep 2020 11:51)  T(F): 98.1 (28 Sep 2020 05:00), Max: 98.3 (27 Sep 2020 11:51)  HR: 68 (28 Sep 2020 06:58) (64 - 81)  BP: 137/64 (28 Sep 2020 05:00) (123/75 - 137/64)  BP(mean): --  RR: 18 (28 Sep 2020 05:00) (18 - 18)  SpO2: 95% (28 Sep 2020 06:58) (95% - 98%)    GENERAL: The patient is awake and alert in no apparent distress.     LUNGS: Clear to auscultation without wheezing, rales or rhonchi; respirations unlabored    HEART: Regular rate and rhythm without murmur.                            8.2    7.58  )-----------( 286      ( 28 Sep 2020 06:55 )             29.0       09-28    137  |  92<L>  |  66<H>  ----------------------------<  110<H>  3.9   |  35<H>  |  1.57<H>    Ca    10.7<H>      28 Sep 2020 06:54      >>> <<<    PROBLEM LIST:  70y Female with HEALTH ISSUES - PROBLEM Dx:  Shortness of breath  Shortness of breath    Chronic kidney disease (CKD), stage III (moderate)  Chronic kidney disease (CKD), stage III (moderate)    Tricuspid valve replaced  Tricuspid valve replaced    Mitral valve replaced  Mitral valve replaced    Atrial fibrillation, unspecified type  Atrial fibrillation, unspecified type    Essential hypertension  Essential hypertension    Acute on chronic heart failure with preserved ejection fraction  Acute on chronic heart failure with preserved ejection fraction    Mitral valve stenosis, unspecified etiology  Mitral valve stenosis, unspecified etiology    CHF (congestive heart failure)  CHF (congestive heart failure)    Gout  Gout    MIGUEL (obstructive sleep apnea)  MIGUEL (obstructive sleep apnea)    Atrial fibrillation  Atrial fibrillation              RECS:        Please call with any questions.    Irma Eaton DO  [facility] Pulmonary/Sleep Medicine  [phone]   PULMONARY PROGRESS NOTE    DAMARI GUERRERO  MRN-[id number]    Patient is a 70y old  Female who presents with a chief complaint of mitral regurgitation, heart failure (27 Sep 2020 17:38)      HPI:  on 4L  pending RHC    ROS:   -    ACTIVE MEDICATION LIST:  MEDICATIONS  (STANDING):  albuterol/ipratropium for Nebulization 3 milliLiter(s) Nebulizer every 6 hours  allopurinol 100 milliGRAM(s) Oral daily  atorvastatin 40 milliGRAM(s) Oral at bedtime  buMETAnide 2 milliGRAM(s) Oral every 12 hours  heparin  Infusion. 1300 Unit(s)/Hr (13 mL/Hr) IV Continuous <Continuous>  lidocaine   Patch 1 Patch Transdermal every 24 hours  metoprolol tartrate 25 milliGRAM(s) Oral two times a day  pantoprazole    Tablet 40 milliGRAM(s) Oral before breakfast    MEDICATIONS  (PRN):  acetaminophen   Tablet .. 650 milliGRAM(s) Oral every 6 hours PRN Temp greater or equal to 38.5C (101.3F), Mild Pain (1 - 3)  heparin   Injectable 9000 Unit(s) IV Push every 6 hours PRN For aPTT less than 40  heparin   Injectable 4000 Unit(s) IV Push every 6 hours PRN For aPTT between 40 - 57      EXAM:  Vital Signs Last 24 Hrs  T(C): 36.7 (28 Sep 2020 05:00), Max: 36.8 (27 Sep 2020 11:51)  T(F): 98.1 (28 Sep 2020 05:00), Max: 98.3 (27 Sep 2020 11:51)  HR: 68 (28 Sep 2020 06:58) (64 - 81)  BP: 137/64 (28 Sep 2020 05:00) (123/75 - 137/64)  BP(mean): --  RR: 18 (28 Sep 2020 05:00) (18 - 18)  SpO2: 95% (28 Sep 2020 06:58) (95% - 98%)    GENERAL: The patient is awake and alert in no apparent distress.     LUNGS: Clear to auscultation without wheezing, rales or rhonchi; respirations unlabored    HEART: Regular rate and rhythm without murmur.                            8.2    7.58  )-----------( 286      ( 28 Sep 2020 06:55 )             29.0       09-28    137  |  92<L>  |  66<H>  ----------------------------<  110<H>  3.9   |  35<H>  |  1.57<H>    Ca    10.7<H>      28 Sep 2020 06:54       ra< from: Xray Chest 1 View- PORTABLE-Urgent (Xray Chest 1 View- PORTABLE-Urgent .) (09.18.20 @ 21:07) >    EXAM:  XR CHEST PORTABLE URGENT 1V                            PROCEDURE DATE:  09/18/2020            INTERPRETATION:  CLINICAL HISTORY: CHF.    TECHNIQUE: Single upright AP chest radiograph.    COMPARISON: Comparison with previous from 6/20/2020.    COMMENT:  Sternotomy wires are noted.    There is mild pulmonary vascular congestion.  There is no focal airspace consolidation.  There are no pleural effusions.    The mediastinal contour is markedly enlarged.  The trachea is midline.    The osseous structures are intact.    IMPRESSION:  Marked cardiomegaly with mild pulmonary vascular congestion likely representing mild fluid overload                  QUANG CAVANAUGH M.D., ATTENDING RADIOLOGIST  This document has been electronically signed. Sep19 2020 11:07AM    < end of copied text >      PROBLEM LIST:  70y Female with HEALTH ISSUES - PROBLEM Dx:  Shortness of breath  Shortness of breath    Chronic kidney disease (CKD), stage III (moderate)  Chronic kidney disease (CKD), stage III (moderate)    Tricuspid valve replaced  Tricuspid valve replaced    Mitral valve replaced  Mitral valve replaced    Atrial fibrillation, unspecified type  Atrial fibrillation, unspecified type    Essential hypertension  Essential hypertension    Acute on chronic heart failure with preserved ejection fraction  Acute on chronic heart failure with preserved ejection fraction    Mitral valve stenosis, unspecified etiology  Mitral valve stenosis, unspecified etiology    CHF (congestive heart failure)  CHF (congestive heart failure)    Gout  Gout    MIGUEL (obstructive sleep apnea)  MIGUEL (obstructive sleep apnea)    Atrial fibrillation  Atrial fibrillation              RECS:  NIVV whenever sleeping  taper 02  diuresis per cardio/ HF team      Please call with any questions.    Irma Eaton DO  [facility] Pul[facility]Sleep [facility]  [phone]

## 2020-09-28 NOTE — PROGRESS NOTE ADULT - PROBLEM SELECTOR PLAN 1
- c/w bumex 2 mg PO twice/day as she appears to be responding well.   - may benefit from RHC/MEMs given multiple admissions in the past; hesitant about MEMs but will arrange RHC sometime this week when she is thought to be euvolemic.   - daily standing weights and strict I&Os.  - may consider ELIZABETH if RHC concerning as TTE with limited windows

## 2020-09-28 NOTE — PROGRESS NOTE ADULT - SUBJECTIVE AND OBJECTIVE BOX
SUBJECTIVE / OVERNIGHT EVENTS: pt denies chest pain, shortness of breath says she feels much better than before      MEDICATIONS  (STANDING):  albuterol/ipratropium for Nebulization 3 milliLiter(s) Nebulizer every 6 hours  allopurinol 100 milliGRAM(s) Oral daily  atorvastatin 40 milliGRAM(s) Oral at bedtime  buMETAnide 2 milliGRAM(s) Oral every 12 hours  heparin  Infusion. 1300 Unit(s)/Hr (13 mL/Hr) IV Continuous <Continuous>  lidocaine   Patch 1 Patch Transdermal every 24 hours  metoprolol tartrate 25 milliGRAM(s) Oral two times a day  pantoprazole    Tablet 40 milliGRAM(s) Oral before breakfast    MEDICATIONS  (PRN):  acetaminophen   Tablet .. 650 milliGRAM(s) Oral every 6 hours PRN Temp greater or equal to 38.5C (101.3F), Mild Pain (1 - 3)  heparin   Injectable 9000 Unit(s) IV Push every 6 hours PRN For aPTT less than 40  heparin   Injectable 4000 Unit(s) IV Push every 6 hours PRN For aPTT between 40 - 57    Vital Signs Last 24 Hrs  T(C): 36.6 (28 Sep 2020 19:41), Max: 36.9 (28 Sep 2020 11:54)  T(F): 97.9 (28 Sep 2020 19:41), Max: 98.4 (28 Sep 2020 11:54)  HR: 64 (28 Sep 2020 22:00) (64 - 80)  BP: 127/70 (28 Sep 2020 19:41) (119/65 - 137/64)  BP(mean): --  RR: 18 (28 Sep 2020 19:41) (18 - 18)  SpO2: 96% (28 Sep 2020 22:00) (94% - 100%)    Eyes: No recent vision problems or eye pain.  ENT: No congestion, ear pain, or sore throat.  Cardiovascular: No chest pain or palpation.  Respiratory: No cough, shortness of breath, congestion, or wheezing.  Gastrointestinal: No abdominal pain, nausea, vomiting, or diarrhea.  Genitourinary: No dysuria.  Musculoskeletal: No joint swelling.  Neurologic: No headache.    PHYSICAL EXAM:  GENERAL: NAD  EYES: EOMI, PERRLA  NECK: Supple, No JVD  CHEST/LUNG: crackles at bases  HEART:  S1 , S2 +  ABDOMEN: soft , bs+  EXTREMITIES:  edema+  NEUROLOGY:alert awake oriented     LABS:  09-28    137  |  92<L>  |  66<H>  ----------------------------<  110<H>  3.9   |  35<H>  |  1.57<H>    Ca    10.7<H>      28 Sep 2020 06:54      Creatinine Trend: 1.57 <--, 1.50 <--, 1.46 <--, 1.52 <--, 1.61 <--, 1.82 <--, 1.61 <--                        8.2    7.58  )-----------( 286      ( 28 Sep 2020 06:55 )             29.0     Urine Studies:              PTT - ( 28 Sep 2020 00:41 )  PTT:92.1 sec

## 2020-09-28 NOTE — PROGRESS NOTE ADULT - ASSESSMENT
Ms. Alcantara is a 70 year old woman with HFpEF in the context of valvular heart disease, likely rheumatic, complicated by pulmonary hypertesnion, HTN (diagnosed in 30's and reportedly well controlled with medications), severe MR s/p mechanical MVR 1999 ([facility] with Dr. Anderson) and severe TR s/p TVR 2012 ([facility]), ?COPD (home 02; no prior tobacco use), AFib on Coumadin (s/p ablation), CKD 3 (b/l SCr ~1.3-1.6), MIGUEL and gout. Followed by Dr. Duarte (cardiologist) who presented to [facility] with SOB and chest tightness transferred on 8/18 for possible mitral intervention for possible mitral stenosis with Dr. Fernandez. Diuretics were held due to worsening renal dysfunction. ABG was consistent with hypercarbia and she reports previously needing Bipap but hasn't used in 10 years. TTE performed with normal LV function with limited views to assess mitral valve however gradients were within normal limits. Suspect she has a component of acute on chronic HFpEF as well as OHS/MIGUEL.    She is gradually improving with ongoing weight loss/diuresis, but she remains very symptomatic.  We will continue IV diuretics and explore hemodynamics and options for home monitoring when she is euvolemic.

## 2020-09-28 NOTE — PROVIDER CONTACT NOTE (OTHER) - BACKGROUND
9/18 Transfer to [facility] for Heart Failure optimization.  CTS consult done- Medical mgt. . PMH: HTN, CHF, Severe Mitral Regurg. S/P Mech. MVR. Severe TR S/P TVR 2012

## 2020-09-29 LAB
ALBUMIN SERPL ELPH-MCNC: 3.9 G/DL — SIGNIFICANT CHANGE UP (ref 3.3–5)
ALP SERPL-CCNC: 108 U/L — SIGNIFICANT CHANGE UP (ref 40–120)
ALT FLD-CCNC: 12 U/L — SIGNIFICANT CHANGE UP (ref 10–45)
ANION GAP SERPL CALC-SCNC: 13 MMOL/L — SIGNIFICANT CHANGE UP (ref 5–17)
APTT BLD: 77.6 SEC — HIGH (ref 27.5–35.5)
AST SERPL-CCNC: 28 U/L — SIGNIFICANT CHANGE UP (ref 10–40)
BASOPHILS # BLD AUTO: 0.04 K/UL — SIGNIFICANT CHANGE UP (ref 0–0.2)
BASOPHILS NFR BLD AUTO: 0.5 % — SIGNIFICANT CHANGE UP (ref 0–2)
BILIRUB SERPL-MCNC: 0.6 MG/DL — SIGNIFICANT CHANGE UP (ref 0.2–1.2)
BUN SERPL-MCNC: 68 MG/DL — HIGH (ref 7–23)
CALCIUM SERPL-MCNC: 10.7 MG/DL — HIGH (ref 8.4–10.5)
CHLORIDE SERPL-SCNC: 93 MMOL/L — LOW (ref 96–108)
CO2 SERPL-SCNC: 32 MMOL/L — HIGH (ref 22–31)
CREAT SERPL-MCNC: 1.71 MG/DL — HIGH (ref 0.5–1.3)
EOSINOPHIL # BLD AUTO: 0.24 K/UL — SIGNIFICANT CHANGE UP (ref 0–0.5)
EOSINOPHIL NFR BLD AUTO: 2.9 % — SIGNIFICANT CHANGE UP (ref 0–6)
GLUCOSE SERPL-MCNC: 119 MG/DL — HIGH (ref 70–99)
HCT VFR BLD CALC: 30.7 % — LOW (ref 34.5–45)
HGB BLD-MCNC: 8.8 G/DL — LOW (ref 11.5–15.5)
IMM GRANULOCYTES NFR BLD AUTO: 0.6 % — SIGNIFICANT CHANGE UP (ref 0–1.5)
LYMPHOCYTES # BLD AUTO: 2.01 K/UL — SIGNIFICANT CHANGE UP (ref 1–3.3)
LYMPHOCYTES # BLD AUTO: 24.6 % — SIGNIFICANT CHANGE UP (ref 13–44)
MCHC RBC-ENTMCNC: 26 PG — LOW (ref 27–34)
MCHC RBC-ENTMCNC: 28.7 GM/DL — LOW (ref 32–36)
MCV RBC AUTO: 90.6 FL — SIGNIFICANT CHANGE UP (ref 80–100)
MONOCYTES # BLD AUTO: 0.7 K/UL — SIGNIFICANT CHANGE UP (ref 0–0.9)
MONOCYTES NFR BLD AUTO: 8.6 % — SIGNIFICANT CHANGE UP (ref 2–14)
NEUTROPHILS # BLD AUTO: 5.13 K/UL — SIGNIFICANT CHANGE UP (ref 1.8–7.4)
NEUTROPHILS NFR BLD AUTO: 62.8 % — SIGNIFICANT CHANGE UP (ref 43–77)
NRBC # BLD: 0 /100 WBCS — SIGNIFICANT CHANGE UP (ref 0–0)
PLATELET # BLD AUTO: 289 K/UL — SIGNIFICANT CHANGE UP (ref 150–400)
POTASSIUM SERPL-MCNC: 4.2 MMOL/L — SIGNIFICANT CHANGE UP (ref 3.5–5.3)
POTASSIUM SERPL-SCNC: 4.2 MMOL/L — SIGNIFICANT CHANGE UP (ref 3.5–5.3)
PROT SERPL-MCNC: 8.3 G/DL — SIGNIFICANT CHANGE UP (ref 6–8.3)
RBC # BLD: 3.39 M/UL — LOW (ref 3.8–5.2)
RBC # FLD: 18.6 % — HIGH (ref 10.3–14.5)
SODIUM SERPL-SCNC: 138 MMOL/L — SIGNIFICANT CHANGE UP (ref 135–145)
WBC # BLD: 8.17 K/UL — SIGNIFICANT CHANGE UP (ref 3.8–10.5)
WBC # FLD AUTO: 8.17 K/UL — SIGNIFICANT CHANGE UP (ref 3.8–10.5)

## 2020-09-29 PROCEDURE — 99232 SBSQ HOSP IP/OBS MODERATE 35: CPT

## 2020-09-29 PROCEDURE — 99233 SBSQ HOSP IP/OBS HIGH 50: CPT

## 2020-09-29 RX ADMIN — PANTOPRAZOLE SODIUM 40 MILLIGRAM(S): 20 TABLET, DELAYED RELEASE ORAL at 05:16

## 2020-09-29 RX ADMIN — Medication 3 MILLILITER(S): at 18:08

## 2020-09-29 RX ADMIN — ATORVASTATIN CALCIUM 40 MILLIGRAM(S): 80 TABLET, FILM COATED ORAL at 21:13

## 2020-09-29 RX ADMIN — Medication 3 MILLILITER(S): at 05:16

## 2020-09-29 RX ADMIN — BUMETANIDE 2 MILLIGRAM(S): 0.25 INJECTION INTRAMUSCULAR; INTRAVENOUS at 05:16

## 2020-09-29 RX ADMIN — Medication 25 MILLIGRAM(S): at 18:08

## 2020-09-29 RX ADMIN — Medication 25 MILLIGRAM(S): at 05:16

## 2020-09-29 RX ADMIN — HEPARIN SODIUM 1200 UNIT(S)/HR: 5000 INJECTION INTRAVENOUS; SUBCUTANEOUS at 08:51

## 2020-09-29 RX ADMIN — Medication 100 MILLIGRAM(S): at 11:18

## 2020-09-29 RX ADMIN — Medication 3 MILLILITER(S): at 11:18

## 2020-09-29 RX ADMIN — BUMETANIDE 2 MILLIGRAM(S): 0.25 INJECTION INTRAMUSCULAR; INTRAVENOUS at 18:08

## 2020-09-29 NOTE — PROGRESS NOTE ADULT - SUBJECTIVE AND OBJECTIVE BOX
SUBJECTIVE / OVERNIGHT EVENTS: pt denies chest pain, shortness of breath says she feels much better than before    MEDICATIONS  (STANDING):  albuterol/ipratropium for Nebulization 3 milliLiter(s) Nebulizer every 6 hours  allopurinol 100 milliGRAM(s) Oral daily  atorvastatin 40 milliGRAM(s) Oral at bedtime  buMETAnide 2 milliGRAM(s) Oral every 12 hours  heparin  Infusion. 1300 Unit(s)/Hr (13 mL/Hr) IV Continuous <Continuous>  lidocaine   Patch 1 Patch Transdermal every 24 hours  metoprolol tartrate 25 milliGRAM(s) Oral two times a day  pantoprazole    Tablet 40 milliGRAM(s) Oral before breakfast    MEDICATIONS  (PRN):  acetaminophen   Tablet .. 650 milliGRAM(s) Oral every 6 hours PRN Temp greater or equal to 38.5C (101.3F), Mild Pain (1 - 3)  heparin   Injectable 9000 Unit(s) IV Push every 6 hours PRN For aPTT less than 40  heparin   Injectable 4000 Unit(s) IV Push every 6 hours PRN For aPTT between 40 - 57    Vital Signs Last 24 Hrs  T(C): 36.6 (29 Sep 2020 19:39), Max: 36.7 (29 Sep 2020 05:04)  T(F): 97.9 (29 Sep 2020 19:39), Max: 98 (29 Sep 2020 05:04)  HR: 72 (29 Sep 2020 23:25) (66 - 72)  BP: 142/73 (29 Sep 2020 19:39) (115/74 - 142/73)  BP(mean): --  RR: 19 (29 Sep 2020 19:39) (18 - 19)  SpO2: 98% (29 Sep 2020 23:25) (95% - 100%)    Eyes: No recent vision problems or eye pain.  ENT: No congestion, ear pain, or sore throat.  Cardiovascular: No chest pain or palpation.  Respiratory: No cough, shortness of breath, congestion, or wheezing.  Gastrointestinal: No abdominal pain, nausea, vomiting, or diarrhea.  Genitourinary: No dysuria.  Musculoskeletal: No joint swelling.  Neurologic: No headache.    PHYSICAL EXAM:  GENERAL: NAD  EYES: EOMI, PERRLA  NECK: Supple, No JVD  CHEST/LUNG: crackles at bases  HEART:  S1 , S2 +  ABDOMEN: soft , bs+  EXTREMITIES:  edema+  NEUROLOGY:alert awake oriented     LABS:  09-29    138  |  93<L>  |  68<H>  ----------------------------<  119<H>  4.2   |  32<H>  |  1.71<H>    Ca    10.7<H>      29 Sep 2020 05:35    TPro  8.3  /  Alb  3.9  /  TBili  0.6  /  DBili      /  AST  28  /  ALT  12  /  AlkPhos  108  09-29    Creatinine Trend: 1.71 <--, 1.57 <--, 1.50 <--, 1.46 <--, 1.52 <--, 1.61 <--, 1.82 <--                        8.8    8.17  )-----------( 289      ( 29 Sep 2020 05:34 )             30.7     Urine Studies:            LIVER FUNCTIONS - ( 29 Sep 2020 05:35 )  Alb: 3.9 g/dL / Pro: 8.3 g/dL / ALK PHOS: 108 U/L / ALT: 12 U/L / AST: 28 U/L / GGT: x           PTT - ( 29 Sep 2020 07:51 )  PTT:77.6 sec

## 2020-09-29 NOTE — PROGRESS NOTE ADULT - SUBJECTIVE AND OBJECTIVE BOX
PULMONARY PROGRESS NOTE    DAMARI GUERRERO  MRN-[id number]    Patient is a 70y old  Female who presents with a chief complaint of mitral regurgitation, heart failure (29 Sep 2020 12:33)      HPI:  on 3L   comfortable  reports using BIPAP overnight    ROS:   -    ACTIVE MEDICATION LIST:  MEDICATIONS  (STANDING):  albuterol/ipratropium for Nebulization 3 milliLiter(s) Nebulizer every 6 hours  allopurinol 100 milliGRAM(s) Oral daily  atorvastatin 40 milliGRAM(s) Oral at bedtime  buMETAnide 2 milliGRAM(s) Oral every 12 hours  heparin  Infusion. 1300 Unit(s)/Hr (13 mL/Hr) IV Continuous <Continuous>  lidocaine   Patch 1 Patch Transdermal every 24 hours  metoprolol tartrate 25 milliGRAM(s) Oral two times a day  pantoprazole    Tablet 40 milliGRAM(s) Oral before breakfast    MEDICATIONS  (PRN):  acetaminophen   Tablet .. 650 milliGRAM(s) Oral every 6 hours PRN Temp greater or equal to 38.5C (101.3F), Mild Pain (1 - 3)  heparin   Injectable 9000 Unit(s) IV Push every 6 hours PRN For aPTT less than 40  heparin   Injectable 4000 Unit(s) IV Push every 6 hours PRN For aPTT between 40 - 57      EXAM:  Vital Signs Last 24 Hrs  T(C): 36.4 (29 Sep 2020 11:32), Max: 36.7 (29 Sep 2020 05:04)  T(F): 97.6 (29 Sep 2020 11:32), Max: 98 (29 Sep 2020 05:04)  HR: 67 (29 Sep 2020 11:32) (62 - 73)  BP: 115/74 (29 Sep 2020 11:32) (115/74 - 127/70)  BP(mean): --  RR: 18 (29 Sep 2020 11:32) (18 - 19)  SpO2: 99% (29 Sep 2020 11:32) (94% - 100%)    GENERAL: The patient is in no apparent distress.     LUNGS:  respirations unlabored    HEART: Regular rate and rhythm without murmur.                            8.8    8.17  )-----------( 289      ( 29 Sep 2020 05:34 )             30.7       09-29    138  |  93<L>  |  68<H>  ----------------------------<  119<H>  4.2   |  32<H>  |  1.71<H>    Ca    10.7<H>      29 Sep 2020 05:35    TPro  8.3  /  Alb  3.9  /  TBili  0.6  /  DBili  x   /  AST  28  /  ALT  12  /  AlkPhos  108  09-29   ra< from: Xray Chest 1 View- PORTABLE-Urgent (Xray Chest 1 View- PORTABLE-Urgent .) (09.18.20 @ 21:07) >    EXAM:  XR CHEST PORTABLE URGENT 1V                            PROCEDURE DATE:  09/18/2020            INTERPRETATION:  CLINICAL HISTORY: CHF.    TECHNIQUE: Single upright AP chest radiograph.    COMPARISON: Comparison with previous from 6/20/2020.    COMMENT:  Sternotomy wires are noted.    There is mild pulmonary vascular congestion.  There is no focal airspace consolidation.  There are no pleural effusions.    The mediastinal contour is markedly enlarged.  The trachea is midline.    The osseous structures are intact.    IMPRESSION:  Marked cardiomegaly with mild pulmonary vascular congestion likely representing mild fluid overload                  QUANG CAVANAUGH M.D., ATTENDING RADIOLOGIST  This document has been electronically signed. Sep19    < end of copied text >    PROBLEM LIST:  70y Female with HEALTH ISSUES - PROBLEM Dx:  Shortness of breath  Shortness of breath    Chronic kidney disease (CKD), stage III (moderate)  Chronic kidney disease (CKD), stage III (moderate)    Tricuspid valve replaced  Tricuspid valve replaced    Mitral valve replaced  Mitral valve replaced    Atrial fibrillation, unspecified type  Atrial fibrillation, unspecified type    Essential hypertension  Essential hypertension    Acute on chronic heart failure with preserved ejection fraction  Acute on chronic heart failure with preserved ejection fraction    Mitral valve stenosis, unspecified etiology  Mitral valve stenosis, unspecified etiology    CHF (congestive heart failure)  CHF (congestive heart failure)    Gout  Gout    MIGUEL (obstructive sleep apnea)  MIGUEL (obstructive sleep apnea)    Atrial fibrillation  Atrial fibrillation         RECS:  continue with NIVV  would suggest planning for to have BPAP at home upon discharge  suggest we taper her 02 further while awake/ at rest- trial of 2L  f/u HF  needs outpatient pfts & sleep study       Please call with any questions.    Irma Eaton, DO  [facility]  [phone]

## 2020-09-29 NOTE — PROGRESS NOTE ADULT - ASSESSMENT
Ms. Alcantara is a 70 year old woman with HFpEF in the context of valvular heart disease, likely rheumatic, complicated by pulmonary hypertesnion, HTN (diagnosed in 30's and reportedly well controlled with medications), severe MR s/p mechanical MVR 1999 ([facility] with Dr. Anderson) and severe TR s/p TVR 2012 ([facility]), ?COPD (home 02; no prior tobacco use), AFib on Coumadin (s/p ablation), CKD 3 (b/l SCr ~1.3-1.6), MIGUEL and gout. Followed by Dr. Duarte (cardiologist) who presented to [facility] with SOB and chest tightness transferred on 8/18 for possible mitral intervention for possible mitral stenosis with Dr. Fernandez. Diuretics were held due to worsening renal dysfunction. ABG was consistent with hypercarbia and she reports previously needing Bipap but hasn't used in 10 years. TTE performed with normal LV function with limited views to assess mitral valve however gradients were within normal limits. Suspect she has a component of acute on chronic HFpEF as well as OHS/MIGUEL.    She is gradually improving with ongoing weight loss/diuresis, but she remains symptomatic, primarily with orthopnea.  We will continue oral diuretics and explore hemodynamics and options for home monitoring when she is euvolemic.

## 2020-09-29 NOTE — PROGRESS NOTE ADULT - SUBJECTIVE AND OBJECTIVE BOX
Subjective: She says that she feels less short of breath today, but she notes some degree of ongoing orthopnea. No cough.     Medications:  acetaminophen   Tablet .. 650 milliGRAM(s) Oral every 6 hours PRN  albuterol/ipratropium for Nebulization 3 milliLiter(s) Nebulizer every 6 hours  allopurinol 100 milliGRAM(s) Oral daily  atorvastatin 40 milliGRAM(s) Oral at bedtime  buMETAnide 2 milliGRAM(s) Oral every 12 hours  heparin   Injectable 9000 Unit(s) IV Push every 6 hours PRN  heparin   Injectable 4000 Unit(s) IV Push every 6 hours PRN  heparin  Infusion. 1300 Unit(s)/Hr IV Continuous <Continuous>  lidocaine   Patch 1 Patch Transdermal every 24 hours  metoprolol tartrate 25 milliGRAM(s) Oral two times a day  pantoprazole    Tablet 40 milliGRAM(s) Oral before breakfast      Physical Exam:    Vital Signs Last 24 Hrs  T(C): 36.4 (29 Sep 2020 11:32), Max: 36.7 (29 Sep 2020 05:04)  T(F): 97.6 (29 Sep 2020 11:32), Max: 98 (29 Sep 2020 05:04)  HR: 67 (29 Sep 2020 11:32) (62 - 80)  BP: 115/74 (29 Sep 2020 11:32) (115/74 - 133/73)  RR: 18 (29 Sep 2020 11:32) (18 - 19)  SpO2: 99% (29 Sep 2020 11:32) (94% - 100%)    Daily Weight in k.7 (29 Sep 2020 09:24)    I&O's Summary    28 Sep 2020 07:  -  29 Sep 2020 07:00  --------------------------------------------------------  IN: 1162 mL / OUT: 1850 mL / NET: -688 mL    29 Sep 2020 07:  -  29 Sep 2020 12:34  --------------------------------------------------------  IN: 24 mL / OUT: 100 mL / NET: -76 mL    General: No distress. Comfortable.  HEENT: EOM intact.  Neck: Neck supple. JVP not clearly elevated. No masses  Chest: Clear to auscultation bilaterally  CV: Mechanical S1 and S2. No murmurs, rub, or gallops. Radial pulses normal. Trace edema.  Abdomen: Soft, non-distended, non-tender  Skin: No rashes or skin breakdown  Neurology: Alert and oriented times three. Sensation intact  Psych: Affect normal    Labs:                        8.8    8.17  )-----------( 289      ( 29 Sep 2020 05:34 )             30.7         138  |  93<L>  |  68<H>  ----------------------------<  119<H>  4.2   |  32<H>  |  1.71<H>    Ca    10.7<H>      29 Sep 2020 05:35    TPro  8.3  /  Alb  3.9  /  TBili  0.6  /  DBili  x   /  AST  28  /  ALT  12  /  AlkPhos  108      PTT - ( 29 Sep 2020 07:51 )  PTT:77.6 sec

## 2020-09-30 LAB
ANION GAP SERPL CALC-SCNC: 11 MMOL/L — SIGNIFICANT CHANGE UP (ref 5–17)
APTT BLD: 95.3 SEC — HIGH (ref 27.5–35.5)
BUN SERPL-MCNC: 72 MG/DL — HIGH (ref 7–23)
CALCIUM SERPL-MCNC: 10.8 MG/DL — HIGH (ref 8.4–10.5)
CHLORIDE SERPL-SCNC: 91 MMOL/L — LOW (ref 96–108)
CO2 SERPL-SCNC: 35 MMOL/L — HIGH (ref 22–31)
CREAT SERPL-MCNC: 1.72 MG/DL — HIGH (ref 0.5–1.3)
GLUCOSE SERPL-MCNC: 119 MG/DL — HIGH (ref 70–99)
HCT VFR BLD CALC: 30.3 % — LOW (ref 34.5–45)
HGB BLD-MCNC: 8.9 G/DL — LOW (ref 11.5–15.5)
INR BLD: 1.19 RATIO — HIGH (ref 0.88–1.16)
MAGNESIUM SERPL-MCNC: 1.9 MG/DL — SIGNIFICANT CHANGE UP (ref 1.6–2.6)
MCHC RBC-ENTMCNC: 26.3 PG — LOW (ref 27–34)
MCHC RBC-ENTMCNC: 29.4 GM/DL — LOW (ref 32–36)
MCV RBC AUTO: 89.4 FL — SIGNIFICANT CHANGE UP (ref 80–100)
NRBC # BLD: 0 /100 WBCS — SIGNIFICANT CHANGE UP (ref 0–0)
PLATELET # BLD AUTO: 306 K/UL — SIGNIFICANT CHANGE UP (ref 150–400)
POTASSIUM SERPL-MCNC: 4 MMOL/L — SIGNIFICANT CHANGE UP (ref 3.5–5.3)
POTASSIUM SERPL-SCNC: 4 MMOL/L — SIGNIFICANT CHANGE UP (ref 3.5–5.3)
PROTHROM AB SERPL-ACNC: 13.9 SEC — HIGH (ref 10.6–13.6)
RBC # BLD: 3.39 M/UL — LOW (ref 3.8–5.2)
RBC # FLD: 18.5 % — HIGH (ref 10.3–14.5)
SODIUM SERPL-SCNC: 137 MMOL/L — SIGNIFICANT CHANGE UP (ref 135–145)
WBC # BLD: 7.93 K/UL — SIGNIFICANT CHANGE UP (ref 3.8–10.5)
WBC # FLD AUTO: 7.93 K/UL — SIGNIFICANT CHANGE UP (ref 3.8–10.5)

## 2020-09-30 PROCEDURE — 99233 SBSQ HOSP IP/OBS HIGH 50: CPT

## 2020-09-30 PROCEDURE — 99232 SBSQ HOSP IP/OBS MODERATE 35: CPT

## 2020-09-30 RX ORDER — BUMETANIDE 0.25 MG/ML
1 INJECTION INTRAMUSCULAR; INTRAVENOUS
Qty: 20 | Refills: 0 | Status: DISCONTINUED | OUTPATIENT
Start: 2020-09-30 | End: 2020-10-05

## 2020-09-30 RX ADMIN — BUMETANIDE 2 MILLIGRAM(S): 0.25 INJECTION INTRAMUSCULAR; INTRAVENOUS at 05:57

## 2020-09-30 RX ADMIN — ATORVASTATIN CALCIUM 40 MILLIGRAM(S): 80 TABLET, FILM COATED ORAL at 20:29

## 2020-09-30 RX ADMIN — PANTOPRAZOLE SODIUM 40 MILLIGRAM(S): 20 TABLET, DELAYED RELEASE ORAL at 05:57

## 2020-09-30 RX ADMIN — Medication 100 MILLIGRAM(S): at 12:18

## 2020-09-30 RX ADMIN — Medication 25 MILLIGRAM(S): at 17:58

## 2020-09-30 RX ADMIN — Medication 25 MILLIGRAM(S): at 05:57

## 2020-09-30 RX ADMIN — BUMETANIDE 5 MG/HR: 0.25 INJECTION INTRAMUSCULAR; INTRAVENOUS at 17:58

## 2020-09-30 RX ADMIN — Medication 3 MILLILITER(S): at 05:57

## 2020-09-30 RX ADMIN — HEPARIN SODIUM 1200 UNIT(S)/HR: 5000 INJECTION INTRAVENOUS; SUBCUTANEOUS at 09:42

## 2020-09-30 RX ADMIN — Medication 3 MILLILITER(S): at 12:18

## 2020-09-30 NOTE — PROGRESS NOTE ADULT - ASSESSMENT
Ms. Alcantara is a 70 year old woman with HFpEF in the context of valvular heart disease, likely rheumatic, complicated by pulmonary hypertesnion, HTN (diagnosed in 30's and reportedly well controlled with medications), severe MR s/p mechanical MVR 1999 ([facility] with Dr. Anderson) and severe TR s/p TVR 2012 ([facility]), ?COPD (home 02; no prior tobacco use), AFib on Coumadin (s/p ablation), CKD 3 (b/l SCr ~1.3-1.6), MIGUEL and gout. Followed by Dr. Duarte (cardiologist) who presented to [facility] with SOB and chest tightness transferred on 8/18 for possible mitral intervention for possible mitral stenosis with Dr. Fernandez. Diuretics were held due to worsening renal dysfunction. ABG was consistent with hypercarbia and she reports previously needing Bipap but hasn't used in 10 years. TTE performed with normal LV function with limited views to assess mitral valve however gradients were within normal limits. Suspect she has a component of acute on chronic HFpEF as well as OHS/MIGUEL.    She is gradually improving with ongoing weight loss/diuresis, but she remains symptomatic, primarily with orthopnea.  We will continue oral diuretics and pursue right heart catheterization to clarify her filling pressures. She is not interested in implantation of Cardiomems due to concern that she would not be able to manage the device at home.

## 2020-09-30 NOTE — PROGRESS NOTE ADULT - SUBJECTIVE AND OBJECTIVE BOX
PULMONARY PROGRESS NOTE    DAMARI GUERRERO  MRN-[id number]    Patient is a 70y old  Female who presents with a chief complaint of mitral regurgitation, heart failure (30 Sep 2020 12:26)      HPI:  sitting in bed  on 2L awake  reports using NIVV overnight  -    ACTIVE MEDICATION LIST:  MEDICATIONS  (STANDING):  albuterol/ipratropium for Nebulization 3 milliLiter(s) Nebulizer every 6 hours  allopurinol 100 milliGRAM(s) Oral daily  atorvastatin 40 milliGRAM(s) Oral at bedtime  buMETAnide 2 milliGRAM(s) Oral every 12 hours  heparin  Infusion. 1300 Unit(s)/Hr (13 mL/Hr) IV Continuous <Continuous>  lidocaine   Patch 1 Patch Transdermal every 24 hours  metoprolol tartrate 25 milliGRAM(s) Oral two times a day  pantoprazole    Tablet 40 milliGRAM(s) Oral before breakfast    MEDICATIONS  (PRN):  acetaminophen   Tablet .. 650 milliGRAM(s) Oral every 6 hours PRN Temp greater or equal to 38.5C (101.3F), Mild Pain (1 - 3)  heparin   Injectable 9000 Unit(s) IV Push every 6 hours PRN For aPTT less than 40  heparin   Injectable 4000 Unit(s) IV Push every 6 hours PRN For aPTT between 40 - 57      EXAM:  Vital Signs Last 24 Hrs  T(C): 36.9 (30 Sep 2020 14:39), Max: 36.9 (30 Sep 2020 14:39)  T(F): 98.4 (30 Sep 2020 14:39), Max: 98.4 (30 Sep 2020 14:39)  HR: 84 (30 Sep 2020 15:15) (67 - 84)  BP: 137/78 (30 Sep 2020 14:39) (121/78 - 142/73)  BP(mean): --  RR: 18 (30 Sep 2020 14:39) (17 - 19)  SpO2: 99% (30 Sep 2020 15:15) (97% - 100%)    GENERAL: The patient is awake and alert in no apparent distress.     LUNGS: Clear to auscultation without wheezing, rales or rhonchi; respirations unlabored    HEART: Regular rate and rhythm without murmur.                            8.9    7.93  )-----------( 306      ( 30 Sep 2020 05:17 )             30.3       09-30    137  |  91<L>  |  72<H>  ----------------------------<  119<H>  4.0   |  35<H>  |  1.72<H>    Ca    10.8<H>      30 Sep 2020 05:17  Mg     1.9     09-30    TPro  8.3  /  Alb  3.9  /  TBili  0.6  /  DBili  x   /  AST  28  /  ALT  12  /  AlkPhos  108  09-29   rad< from: Xray Chest 1 View- PORTABLE-Urgent (Xray Chest 1 View- PORTABLE-Urgent .) (09.18.20 @ 21:07) >    EXAM:  XR CHEST PORTABLE URGENT 1V                            PROCEDURE DATE:  09/18/2020            INTERPRETATION:  CLINICAL HISTORY: CHF.    TECHNIQUE: Single upright AP chest radiograph.    COMPARISON: Comparison with previous from 6/20/2020.    COMMENT:  Sternotomy wires are noted.    There is mild pulmonary vascular congestion.  There is no focal airspace consolidation.  There are no pleural effusions.    The mediastinal contour is markedly enlarged.  The trachea is midline.    The osseous structures are intact.    IMPRESSION:  Marked cardiomegaly with mild pulmonary vascular congestion likely representing mild fluid overload          < end of copied text >      PROBLEM LIST:  70y Female with HEALTH ISSUES - PROBLEM Dx:  Shortness of breath  Shortness of breath    Chronic kidney disease (CKD), stage III (moderate)  Chronic kidney disease (CKD), stage III (moderate)    Tricuspid valve replaced  Tricuspid valve replaced    Mitral valve replaced  Mitral valve replaced    Atrial fibrillation, unspecified type  Atrial fibrillation, unspecified type    Essential hypertension  Essential hypertension    Acute on chronic heart failure with preserved ejection fraction  Acute on chronic heart failure with preserved ejection fraction    Mitral valve stenosis, unspecified etiology  Mitral valve stenosis, unspecified etiology    CHF (congestive heart failure)  CHF (congestive heart failure)    Gout  Gout    MIGUEL (obstructive sleep apnea)  MIGUEL (obstructive sleep apnea)    Atrial fibrillation  Atrial fibrillation           RECS:  diuresis per HF team  continue on NIVV  she is doing well on 2L during day  can try 1L during the day      Please call with any questions.    Irma Eaton DO  [facility] Pulmonary/Sleep Medicine  [phone]

## 2020-09-30 NOTE — PROGRESS NOTE ADULT - SUBJECTIVE AND OBJECTIVE BOX
Subjective: No current complaints. She feels less short of breath overall, but with persistent orthopnea.     Medications:  acetaminophen   Tablet .. 650 milliGRAM(s) Oral every 6 hours PRN  albuterol/ipratropium for Nebulization 3 milliLiter(s) Nebulizer every 6 hours  allopurinol 100 milliGRAM(s) Oral daily  atorvastatin 40 milliGRAM(s) Oral at bedtime  buMETAnide 2 milliGRAM(s) Oral every 12 hours  heparin   Injectable 9000 Unit(s) IV Push every 6 hours PRN  heparin   Injectable 4000 Unit(s) IV Push every 6 hours PRN  heparin  Infusion. 1300 Unit(s)/Hr IV Continuous <Continuous>  lidocaine   Patch 1 Patch Transdermal every 24 hours  metoprolol tartrate 25 milliGRAM(s) Oral two times a day  pantoprazole    Tablet 40 milliGRAM(s) Oral before breakfast      Physical Exam:    Vital Signs Last 24 Hrs  T(C): 36.4 (30 Sep 2020 08:31), Max: 36.6 (29 Sep 2020 19:39)  T(F): 97.6 (30 Sep 2020 08:31), Max: 97.9 (29 Sep 2020 19:39)  HR: 74 (30 Sep 2020 10:40) (67 - 74)  BP: 121/78 (30 Sep 2020 10:40) (121/78 - 142/73)  RR: 17 (30 Sep 2020 08:31) (17 - 19)  SpO2: 97% (30 Sep 2020 10:40) (97% - 100%)    Daily Weight in k (30 Sep 2020 08:35)    I&O's Summary    29 Sep 2020 07  -  30 Sep 2020 07:00  --------------------------------------------------------  IN: 828 mL / OUT: 1200 mL / NET: -372 mL    30 Sep 2020 07:  -  30 Sep 2020 12:33  --------------------------------------------------------  IN: 360 mL / OUT: 400 mL / NET: -40 mL      General: No distress. Comfortable.  HEENT: EOM intact.  Neck: Neck supple. JVP mildly elevated. No masses  Chest: Clear to auscultation bilaterally  CV: RRR with mechanical S1 and normal S2. No rubs or gallops. Radial pulses normal. Edema around ankles.   Abdomen: Soft, non-distended, non-tender  Skin: No rashes or skin breakdown  Neurology: Alert and oriented times three. Sensation intact  Psych: Affect normal    Labs:                        8.9    7.93  )-----------( 306      ( 30 Sep 2020 05:17 )             30.3     09-30    137  |  91<L>  |  72<H>  ----------------------------<  119<H>  4.0   |  35<H>  |  1.72<H>    Ca    10.8<H>      30 Sep 2020 05:17  Mg     1.9         TPro  8.3  /  Alb  3.9  /  TBili  0.6  /  DBili  x   /  AST  28  /  ALT  12  /  AlkPhos  108  -    PT/INR - ( 30 Sep 2020 08:20 )   PT: 13.9 sec;   INR: 1.19 ratio         PTT - ( 30 Sep 2020 08:20 )  PTT:95.3 sec

## 2020-09-30 NOTE — PROGRESS NOTE ADULT - PROBLEM SELECTOR PLAN 1
- c/w bumex 2 mg PO twice/day as she appears to be responding well.   - daily standing weights and strict I&Os.  - may consider ELIZABETH if RHC concerning as TTE with limited windows

## 2020-09-30 NOTE — PROGRESS NOTE ADULT - SUBJECTIVE AND OBJECTIVE BOX
SUBJECTIVE / OVERNIGHT EVENTS: pt denies chest pain, shortness of breath says she feels much better than before    MEDICATIONS  (STANDING):  albuterol/ipratropium for Nebulization 3 milliLiter(s) Nebulizer every 6 hours  allopurinol 100 milliGRAM(s) Oral daily  atorvastatin 40 milliGRAM(s) Oral at bedtime  buMETAnide Infusion 1 mG/Hr (5 mL/Hr) IV Continuous <Continuous>  heparin  Infusion. 1300 Unit(s)/Hr (13 mL/Hr) IV Continuous <Continuous>  lidocaine   Patch 1 Patch Transdermal every 24 hours  metoprolol tartrate 25 milliGRAM(s) Oral two times a day  pantoprazole    Tablet 40 milliGRAM(s) Oral before breakfast    MEDICATIONS  (PRN):  acetaminophen   Tablet .. 650 milliGRAM(s) Oral every 6 hours PRN Temp greater or equal to 38.5C (101.3F), Mild Pain (1 - 3)  heparin   Injectable 9000 Unit(s) IV Push every 6 hours PRN For aPTT less than 40  heparin   Injectable 4000 Unit(s) IV Push every 6 hours PRN For aPTT between 40 - 57    Vital Signs Last 24 Hrs  T(C): 37.1 (30 Sep 2020 19:56), Max: 37.1 (30 Sep 2020 19:56)  T(F): 98.8 (30 Sep 2020 19:56), Max: 98.8 (30 Sep 2020 19:56)  HR: 80 (01 Oct 2020 00:04) (63 - 84)  BP: 117/69 (30 Sep 2020 19:56) (117/69 - 137/78)  BP(mean): --  RR: 18 (30 Sep 2020 19:56) (17 - 18)  SpO2: 98% (01 Oct 2020 00:04) (97% - 100%)    Eyes: No recent vision problems or eye pain.  ENT: No congestion, ear pain, or sore throat.  Cardiovascular: No chest pain or palpation.  Respiratory: No cough, shortness of breath, congestion, or wheezing.  Gastrointestinal: No abdominal pain, nausea, vomiting, or diarrhea.  Genitourinary: No dysuria.  Musculoskeletal: No joint swelling.  Neurologic: No headache.    PHYSICAL EXAM:  GENERAL: NAD  EYES: EOMI, PERRLA  NECK: Supple, No JVD  CHEST/LUNG: crackles at bases  HEART:  S1 , S2 +  ABDOMEN: soft , bs+  EXTREMITIES:  edema+  NEUROLOGY:alert awake oriented     LABS:  09-30    137  |  91<L>  |  72<H>  ----------------------------<  119<H>  4.0   |  35<H>  |  1.72<H>    Ca    10.8<H>      30 Sep 2020 05:17  Mg     1.9     09-30    TPro  8.3  /  Alb  3.9  /  TBili  0.6  /  DBili      /  AST  28  /  ALT  12  /  AlkPhos  108  09-29    Creatinine Trend: 1.72 <--, 1.71 <--, 1.57 <--, 1.50 <--, 1.46 <--, 1.52 <--, 1.61 <--                        8.9    7.93  )-----------( 306      ( 30 Sep 2020 05:17 )             30.3     Urine Studies:            LIVER FUNCTIONS - ( 29 Sep 2020 05:35 )  Alb: 3.9 g/dL / Pro: 8.3 g/dL / ALK PHOS: 108 U/L / ALT: 12 U/L / AST: 28 U/L / GGT: x           PT/INR - ( 30 Sep 2020 08:20 )   PT: 13.9 sec;   INR: 1.19 ratio         PTT - ( 30 Sep 2020 08:20 )  PTT:95.3 sec

## 2020-10-01 LAB
ANION GAP SERPL CALC-SCNC: 11 MMOL/L — SIGNIFICANT CHANGE UP (ref 5–17)
APTT BLD: 77.2 SEC — HIGH (ref 27.5–35.5)
BUN SERPL-MCNC: 79 MG/DL — HIGH (ref 7–23)
CALCIUM SERPL-MCNC: 10.9 MG/DL — HIGH (ref 8.4–10.5)
CHLORIDE SERPL-SCNC: 89 MMOL/L — LOW (ref 96–108)
CO2 SERPL-SCNC: 35 MMOL/L — HIGH (ref 22–31)
CREAT SERPL-MCNC: 2.07 MG/DL — HIGH (ref 0.5–1.3)
GLUCOSE SERPL-MCNC: 115 MG/DL — HIGH (ref 70–99)
HCT VFR BLD CALC: 30.1 % — LOW (ref 34.5–45)
HGB BLD-MCNC: 8.7 G/DL — LOW (ref 11.5–15.5)
MCHC RBC-ENTMCNC: 26.1 PG — LOW (ref 27–34)
MCHC RBC-ENTMCNC: 28.9 GM/DL — LOW (ref 32–36)
MCV RBC AUTO: 90.4 FL — SIGNIFICANT CHANGE UP (ref 80–100)
NRBC # BLD: 0 /100 WBCS — SIGNIFICANT CHANGE UP (ref 0–0)
PLATELET # BLD AUTO: 309 K/UL — SIGNIFICANT CHANGE UP (ref 150–400)
POTASSIUM SERPL-MCNC: 4.3 MMOL/L — SIGNIFICANT CHANGE UP (ref 3.5–5.3)
POTASSIUM SERPL-SCNC: 4.3 MMOL/L — SIGNIFICANT CHANGE UP (ref 3.5–5.3)
RBC # BLD: 3.33 M/UL — LOW (ref 3.8–5.2)
RBC # FLD: 18.4 % — HIGH (ref 10.3–14.5)
SODIUM SERPL-SCNC: 135 MMOL/L — SIGNIFICANT CHANGE UP (ref 135–145)
WBC # BLD: 8.61 K/UL — SIGNIFICANT CHANGE UP (ref 3.8–10.5)
WBC # FLD AUTO: 8.61 K/UL — SIGNIFICANT CHANGE UP (ref 3.8–10.5)

## 2020-10-01 PROCEDURE — 99233 SBSQ HOSP IP/OBS HIGH 50: CPT

## 2020-10-01 RX ADMIN — ATORVASTATIN CALCIUM 40 MILLIGRAM(S): 80 TABLET, FILM COATED ORAL at 20:38

## 2020-10-01 RX ADMIN — PANTOPRAZOLE SODIUM 40 MILLIGRAM(S): 20 TABLET, DELAYED RELEASE ORAL at 06:28

## 2020-10-01 RX ADMIN — Medication 25 MILLIGRAM(S): at 17:09

## 2020-10-01 RX ADMIN — Medication 650 MILLIGRAM(S): at 21:30

## 2020-10-01 RX ADMIN — Medication 100 MILLIGRAM(S): at 12:59

## 2020-10-01 RX ADMIN — HEPARIN SODIUM 1200 UNIT(S)/HR: 5000 INJECTION INTRAVENOUS; SUBCUTANEOUS at 09:42

## 2020-10-01 RX ADMIN — Medication 25 MILLIGRAM(S): at 06:28

## 2020-10-01 RX ADMIN — BUMETANIDE 5 MG/HR: 0.25 INJECTION INTRAMUSCULAR; INTRAVENOUS at 09:43

## 2020-10-01 RX ADMIN — Medication 650 MILLIGRAM(S): at 20:38

## 2020-10-01 RX ADMIN — Medication 3 MILLILITER(S): at 06:28

## 2020-10-01 RX ADMIN — Medication 3 MILLILITER(S): at 17:09

## 2020-10-01 RX ADMIN — Medication 3 MILLILITER(S): at 13:00

## 2020-10-01 NOTE — PROGRESS NOTE ADULT - PROBLEM SELECTOR PLAN 1
- c/w bumex infusion as she appears to be responding well.   - daily standing weights and strict I&Os.

## 2020-10-01 NOTE — PROGRESS NOTE ADULT - ASSESSMENT
Ms. Alcantara is a 70 year old woman with HFpEF in the context of valvular heart disease, likely rheumatic, complicated by pulmonary hypertesnion, HTN (diagnosed in 30's and reportedly well controlled with medications), severe MR s/p mechanical MVR 1999 ([facility] with Dr. Anderson) and severe TR s/p TVR 2012 ([facility]), ?COPD (home 02; no prior tobacco use), AFib on Coumadin (s/p ablation), CKD 3 (b/l SCr ~1.3-1.6), MIGUEL and gout. Followed by Dr. Duarte (cardiologist) who presented to [facility] with SOB and chest tightness transferred on 8/18 for possible mitral intervention for possible mitral stenosis with Dr. Fernandez. Diuretics were held due to worsening renal dysfunction. ABG was consistent with hypercarbia and she reports previously needing Bipap but hasn't used in 10 years. TTE performed with normal LV function with limited views to assess mitral valve however gradients were within normal limits. Suspect she has a component of acute on chronic HFpEF as well as OHS/MIGUEL.    She is gradually improving with ongoing weight loss/diuresis, but she remains symptomatic, primarily with orthopnea.  We will continue oral diuretics and pursue right heart catheterization to clarify her filling pressures when she is able to lie flat. She is not interested in implantation of Cardiomems due to concern that she would not be able to manage the device at home.

## 2020-10-01 NOTE — PROGRESS NOTE ADULT - SUBJECTIVE AND OBJECTIVE BOX
SUBJECTIVE / OVERNIGHT EVENTS: pt denies chest pain, shortness of breath says she feels much better than before    MEDICATIONS  (STANDING):  albuterol/ipratropium for Nebulization 3 milliLiter(s) Nebulizer every 6 hours  allopurinol 100 milliGRAM(s) Oral daily  atorvastatin 40 milliGRAM(s) Oral at bedtime  buMETAnide Infusion 1 mG/Hr (5 mL/Hr) IV Continuous <Continuous>  heparin  Infusion. 1300 Unit(s)/Hr (13 mL/Hr) IV Continuous <Continuous>  lidocaine   Patch 1 Patch Transdermal every 24 hours  metoprolol tartrate 25 milliGRAM(s) Oral two times a day  pantoprazole    Tablet 40 milliGRAM(s) Oral before breakfast    MEDICATIONS  (PRN):  acetaminophen   Tablet .. 650 milliGRAM(s) Oral every 6 hours PRN Temp greater or equal to 38.5C (101.3F), Mild Pain (1 - 3)  heparin   Injectable 9000 Unit(s) IV Push every 6 hours PRN For aPTT less than 40  heparin   Injectable 4000 Unit(s) IV Push every 6 hours PRN For aPTT between 40 - 57    Vital Signs Last 24 Hrs  T(C): 36.8 (01 Oct 2020 20:15), Max: 36.9 (01 Oct 2020 13:53)  T(F): 98.3 (01 Oct 2020 20:15), Max: 98.4 (01 Oct 2020 13:53)  HR: 75 (01 Oct 2020 20:15) (66 - 75)  BP: 105/66 (01 Oct 2020 20:15) (105/66 - 123/69)  BP(mean): --  RR: 18 (01 Oct 2020 20:15) (18 - 18)  SpO2: 99% (01 Oct 2020 20:15) (92% - 99%)    Eyes: No recent vision problems or eye pain.  ENT: No congestion, ear pain, or sore throat.  Cardiovascular: No chest pain or palpation.  Respiratory: No cough, shortness of breath, congestion, or wheezing.  Gastrointestinal: No abdominal pain, nausea, vomiting, or diarrhea.  Genitourinary: No dysuria.  Musculoskeletal: No joint swelling.  Neurologic: No headache.    PHYSICAL EXAM:  GENERAL: NAD  EYES: EOMI, PERRLA  NECK: Supple, No JVD  CHEST/LUNG: crackles at bases  HEART:  S1 , S2 +  ABDOMEN: soft , bs+  EXTREMITIES:  edema+  NEUROLOGY:alert awake oriented     LABS:  10-01    135  |  89<L>  |  79<H>  ----------------------------<  115<H>  4.3   |  35<H>  |  2.07<H>    Ca    10.9<H>      01 Oct 2020 05:20  Mg     1.9     09-30      Creatinine Trend: 2.07 <--, 1.72 <--, 1.71 <--, 1.57 <--, 1.50 <--, 1.46 <--, 1.52 <--                        8.7    8.61  )-----------( 309      ( 01 Oct 2020 05:20 )             30.1     Urine Studies:              PT/INR - ( 30 Sep 2020 08:20 )   PT: 13.9 sec;   INR: 1.19 ratio         PTT - ( 01 Oct 2020 09:10 )  PTT:77.2 sec

## 2020-10-02 LAB
ANION GAP SERPL CALC-SCNC: 15 MMOL/L — SIGNIFICANT CHANGE UP (ref 5–17)
APTT BLD: 82.4 SEC — HIGH (ref 27.5–35.5)
BUN SERPL-MCNC: 89 MG/DL — HIGH (ref 7–23)
CALCIUM SERPL-MCNC: 10.9 MG/DL — HIGH (ref 8.4–10.5)
CHLORIDE SERPL-SCNC: 88 MMOL/L — LOW (ref 96–108)
CO2 SERPL-SCNC: 32 MMOL/L — HIGH (ref 22–31)
CREAT SERPL-MCNC: 2.23 MG/DL — HIGH (ref 0.5–1.3)
GLUCOSE SERPL-MCNC: 108 MG/DL — HIGH (ref 70–99)
HCT VFR BLD CALC: 31.3 % — LOW (ref 34.5–45)
HGB BLD-MCNC: 9.3 G/DL — LOW (ref 11.5–15.5)
MAGNESIUM SERPL-MCNC: 2 MG/DL — SIGNIFICANT CHANGE UP (ref 1.6–2.6)
MCHC RBC-ENTMCNC: 26.4 PG — LOW (ref 27–34)
MCHC RBC-ENTMCNC: 29.7 GM/DL — LOW (ref 32–36)
MCV RBC AUTO: 88.9 FL — SIGNIFICANT CHANGE UP (ref 80–100)
NRBC # BLD: 0 /100 WBCS — SIGNIFICANT CHANGE UP (ref 0–0)
PLATELET # BLD AUTO: 330 K/UL — SIGNIFICANT CHANGE UP (ref 150–400)
POTASSIUM SERPL-MCNC: 3.9 MMOL/L — SIGNIFICANT CHANGE UP (ref 3.5–5.3)
POTASSIUM SERPL-SCNC: 3.9 MMOL/L — SIGNIFICANT CHANGE UP (ref 3.5–5.3)
RBC # BLD: 3.52 M/UL — LOW (ref 3.8–5.2)
RBC # FLD: 18.3 % — HIGH (ref 10.3–14.5)
SODIUM SERPL-SCNC: 135 MMOL/L — SIGNIFICANT CHANGE UP (ref 135–145)
WBC # BLD: 8.72 K/UL — SIGNIFICANT CHANGE UP (ref 3.8–10.5)
WBC # FLD AUTO: 8.72 K/UL — SIGNIFICANT CHANGE UP (ref 3.8–10.5)

## 2020-10-02 PROCEDURE — 99232 SBSQ HOSP IP/OBS MODERATE 35: CPT

## 2020-10-02 PROCEDURE — 99233 SBSQ HOSP IP/OBS HIGH 50: CPT

## 2020-10-02 RX ORDER — POTASSIUM CHLORIDE 20 MEQ
40 PACKET (EA) ORAL ONCE
Refills: 0 | Status: COMPLETED | OUTPATIENT
Start: 2020-10-02 | End: 2020-10-02

## 2020-10-02 RX ADMIN — PANTOPRAZOLE SODIUM 40 MILLIGRAM(S): 20 TABLET, DELAYED RELEASE ORAL at 05:53

## 2020-10-02 RX ADMIN — Medication 100 MILLIGRAM(S): at 12:14

## 2020-10-02 RX ADMIN — BUMETANIDE 5 MG/HR: 0.25 INJECTION INTRAMUSCULAR; INTRAVENOUS at 09:54

## 2020-10-02 RX ADMIN — Medication 40 MILLIEQUIVALENT(S): at 11:28

## 2020-10-02 RX ADMIN — Medication 25 MILLIGRAM(S): at 17:46

## 2020-10-02 RX ADMIN — Medication 3 MILLILITER(S): at 05:52

## 2020-10-02 RX ADMIN — Medication 25 MILLIGRAM(S): at 05:53

## 2020-10-02 RX ADMIN — ATORVASTATIN CALCIUM 40 MILLIGRAM(S): 80 TABLET, FILM COATED ORAL at 21:25

## 2020-10-02 RX ADMIN — Medication 3 MILLILITER(S): at 12:14

## 2020-10-02 RX ADMIN — Medication 3 MILLILITER(S): at 17:46

## 2020-10-02 RX ADMIN — HEPARIN SODIUM 1200 UNIT(S)/HR: 5000 INJECTION INTRAVENOUS; SUBCUTANEOUS at 09:54

## 2020-10-02 NOTE — PROGRESS NOTE ADULT - SUBJECTIVE AND OBJECTIVE BOX
SUBJECTIVE / OVERNIGHT EVENTS: pt denies chest pain, shortness of breath says she feels much better than before    MEDICATIONS  (STANDING):  albuterol/ipratropium for Nebulization 3 milliLiter(s) Nebulizer every 6 hours  allopurinol 100 milliGRAM(s) Oral daily  atorvastatin 40 milliGRAM(s) Oral at bedtime  buMETAnide Infusion 1 mG/Hr (5 mL/Hr) IV Continuous <Continuous>  heparin  Infusion. 1300 Unit(s)/Hr (13 mL/Hr) IV Continuous <Continuous>  lidocaine   Patch 1 Patch Transdermal every 24 hours  metoprolol tartrate 25 milliGRAM(s) Oral two times a day  pantoprazole    Tablet 40 milliGRAM(s) Oral before breakfast    MEDICATIONS  (PRN):  acetaminophen   Tablet .. 650 milliGRAM(s) Oral every 6 hours PRN Temp greater or equal to 38.5C (101.3F), Mild Pain (1 - 3)  heparin   Injectable 9000 Unit(s) IV Push every 6 hours PRN For aPTT less than 40  heparin   Injectable 4000 Unit(s) IV Push every 6 hours PRN For aPTT between 40 - 57    Vital Signs Last 24 Hrs  T(C): 36.4 (02 Oct 2020 20:18), Max: 36.9 (02 Oct 2020 12:46)  T(F): 97.5 (02 Oct 2020 20:18), Max: 98.5 (02 Oct 2020 12:46)  HR: 70 (02 Oct 2020 20:18) (68 - 78)  BP: 120/71 (02 Oct 2020 20:18) (107/68 - 135/80)  BP(mean): 87 (02 Oct 2020 20:18) (87 - 87)  RR: 18 (02 Oct 2020 20:18) (18 - 18)  SpO2: 93% (02 Oct 2020 20:18) (93% - 98%)    Eyes: No recent vision problems or eye pain.  ENT: No congestion, ear pain, or sore throat.  Cardiovascular: No chest pain or palpation.  Respiratory: No cough, shortness of breath, congestion, or wheezing.  Gastrointestinal: No abdominal pain, nausea, vomiting, or diarrhea.  Genitourinary: No dysuria.  Musculoskeletal: No joint swelling.  Neurologic: No headache.    PHYSICAL EXAM:  GENERAL: NAD  EYES: EOMI, PERRLA  NECK: Supple, No JVD  CHEST/LUNG: crackles at bases  HEART:  S1 , S2 +  ABDOMEN: soft , bs+  EXTREMITIES:  edema+  NEUROLOGY:alert awake oriented     LABS:  10-02    135  |  88<L>  |  89<H>  ----------------------------<  108<H>  3.9   |  32<H>  |  2.23<H>    Ca    10.9<H>      02 Oct 2020 05:11  Mg     2.0     10-02      Creatinine Trend: 2.23 <--, 2.07 <--, 1.72 <--, 1.71 <--, 1.57 <--, 1.50 <--, 1.46 <--                        9.3    8.72  )-----------( 330      ( 02 Oct 2020 05:10 )             31.3     Urine Studies:              PTT - ( 02 Oct 2020 08:51 )  PTT:82.4 sec        PT/INR - ( 30 Sep 2020 08:20 )   PT: 13.9 sec;   INR: 1.19 ratio         PTT - ( 01 Oct 2020 09:10 )  PTT:77.2 sec

## 2020-10-02 NOTE — PROGRESS NOTE ADULT - ASSESSMENT
Ms. Alcantara is a 70 year old woman with HFpEF in the context of valvular heart disease, likely rheumatic, complicated by pulmonary hypertesnion, HTN (diagnosed in 30's and reportedly well controlled with medications), severe MR s/p mechanical MVR 1999 ([facility] with Dr. Anderson) and severe TR s/p TVR 2012 ([facility]), ?COPD (home 02; no prior tobacco use), AFib on Coumadin (s/p ablation), CKD 3 (b/l SCr ~1.3-1.6), MIGUEL and gout. Followed by Dr. Duarte (cardiologist) who presented to [facility] with SOB and chest tightness transferred on 8/18 for possible mitral intervention for possible mitral stenosis with Dr. Fernandez. Diuretics were held due to worsening renal dysfunction. ABG was consistent with hypercarbia and she reports previously needing Bipap but hasn't used in 10 years. TTE performed with normal LV function with limited views to assess mitral valve however gradients were within normal limits. Suspect she has a component of acute on chronic HFpEF as well as OHS/MIGUEL.    She is gradually improving with ongoing weight loss/diuresis, but she remains symptomatic, primarily with orthopnea.  She has worsening renal function. She is not interested in implantation of Cardiomems due to concern that she would not be able to manage the device at home.     Plan discussed with Dr. Newman.

## 2020-10-02 NOTE — PROGRESS NOTE ADULT - SUBJECTIVE AND OBJECTIVE BOX
Subjective:     Medications:  acetaminophen   Tablet .. 650 milliGRAM(s) Oral every 6 hours PRN  albuterol/ipratropium for Nebulization 3 milliLiter(s) Nebulizer every 6 hours  allopurinol 100 milliGRAM(s) Oral daily  atorvastatin 40 milliGRAM(s) Oral at bedtime  buMETAnide Infusion 1 mG/Hr IV Continuous <Continuous>  heparin   Injectable 9000 Unit(s) IV Push every 6 hours PRN  heparin   Injectable 4000 Unit(s) IV Push every 6 hours PRN  heparin  Infusion. 1300 Unit(s)/Hr IV Continuous <Continuous>  lidocaine   Patch 1 Patch Transdermal every 24 hours  metoprolol tartrate 25 milliGRAM(s) Oral two times a day  pantoprazole    Tablet 40 milliGRAM(s) Oral before breakfast      Physical Exam:    Vital Signs Last 24 Hrs  T(C): 36.9 (02 Oct 2020 12:46), Max: 36.9 (01 Oct 2020 13:53)  T(F): 98.5 (02 Oct 2020 12:46), Max: 98.5 (02 Oct 2020 12:46)  HR: 76 (02 Oct 2020 12:46) (70 - 78)  BP: 115/73 (02 Oct 2020 12:46) (105/66 - 135/80)  RR: 18 (02 Oct 2020 12:46) (18 - 18)  SpO2: 97% (02 Oct 2020 12:46) (95% - 99%)    Daily Weight in k.4 (02 Oct 2020 07:22)    I&O's Summary    01 Oct 2020 07:01  -  02 Oct 2020 07:00  --------------------------------------------------------  IN: 1299 mL / OUT: 2500 mL / NET: -1201 mL    02 Oct 2020 07:01  -  02 Oct 2020 13:15  --------------------------------------------------------  IN: 665 mL / OUT: 250 mL / NET: 415 mL        General: No distress. Comfortable.  HEENT: EOM intact.  Neck: Neck supple. JVP not clearly elevated. No masses  Chest: Clear to auscultation bilaterally  CV: Normal S1 and S2. No rubs or gallops. Radial pulses normal. Trace edema around ankles.   Abdomen: Soft, non-distended, non-tender  Skin: No rashes or skin breakdown  Neurology: Alert and oriented times three. Sensation intact  Psych: Affect normal    Labs:                        9.3    8.72  )-----------( 330      ( 02 Oct 2020 05:10 )             31.3     10-    135  |  88<L>  |  89<H>  ----------------------------<  108<H>  3.9   |  32<H>  |  2.23<H>    Ca    10.9<H>      02 Oct 2020 05:11  Mg     2.0     10-    PTT - ( 02 Oct 2020 08:51 )  PTT:82.4 sec

## 2020-10-02 NOTE — PROGRESS NOTE ADULT - SUBJECTIVE AND OBJECTIVE BOX
PULMONARY PROGRESS NOTE    DAMARI GUERRERO  MRN-[id number]    Patient is a 70y old  Female who presents with a chief complaint of mitral regurgitation, heart failure (30 Sep 2020 12:26)      HPI:  sitting in bed  reports using NIVV overnight  sob with exertion    MEDICATIONS  (STANDING):  albuterol/ipratropium for Nebulization 3 milliLiter(s) Nebulizer every 6 hours  allopurinol 100 milliGRAM(s) Oral daily  atorvastatin 40 milliGRAM(s) Oral at bedtime  buMETAnide Infusion 1 mG/Hr (5 mL/Hr) IV Continuous <Continuous>  heparin  Infusion. 1300 Unit(s)/Hr (13 mL/Hr) IV Continuous <Continuous>  lidocaine   Patch 1 Patch Transdermal every 24 hours  metoprolol tartrate 25 milliGRAM(s) Oral two times a day  pantoprazole    Tablet 40 milliGRAM(s) Oral before breakfast    MEDICATIONS  (PRN):  acetaminophen   Tablet .. 650 milliGRAM(s) Oral every 6 hours PRN Temp greater or equal to 38.5C (101.3F), Mild Pain (1 - 3)  heparin   Injectable 9000 Unit(s) IV Push every 6 hours PRN For aPTT less than 40  heparin   Injectable 4000 Unit(s) IV Push every 6 hours PRN For aPTT between 40 - 57        EXAM:  Vital Signs Last 24 Hrs  T(C): 36.7 (02 Oct 2020 04:40), Max: 36.9 (01 Oct 2020 13:53)  T(F): 98.1 (02 Oct 2020 04:40), Max: 98.4 (01 Oct 2020 13:53)  HR: 77 (02 Oct 2020 10:23) (70 - 78)  BP: 135/80 (02 Oct 2020 04:40) (105/66 - 135/80)  BP(mean): --  RR: 18 (02 Oct 2020 04:40) (18 - 18)  SpO2: 95% (02 Oct 2020 10:23) (95% - 99%)    GENERAL: The patient is awake and alert in no apparent distress.     LUNGS: decreased bs bilat, no wheeze                                9.3    8.72  )-----------( 330      ( 02 Oct 2020 05:10 )             31.3   10-02    135  |  88<L>  |  89<H>  ----------------------------<  108<H>  3.9   |  32<H>  |  2.23<H>    Ca    10.9<H>      02 Oct 2020 05:11  Mg     2.0     10-02       rad< from: Xray Chest 1 View- PORTABLE-Urgent (Xray Chest 1 View- PORTABLE-Urgent .) (09.18.20 @ 21:07) >    EXAM:  XR CHEST PORTABLE URGENT 1V                            PROCEDURE DATE:  09/18/2020            INTERPRETATION:  CLINICAL HISTORY: CHF.    TECHNIQUE: Single upright AP chest radiograph.    COMPARISON: Comparison with previous from 6/20/2020.    COMMENT:  Sternotomy wires are noted.    There is mild pulmonary vascular congestion.  There is no focal airspace consolidation.  There are no pleural effusions.    The mediastinal contour is markedly enlarged.  The trachea is midline.    The osseous structures are intact.    IMPRESSION:  Marked cardiomegaly with mild pulmonary vascular congestion likely representing mild fluid overload          < end of copied text >      PROBLEM LIST:  70y Female with HEALTH ISSUES - PROBLEM Dx:  Shortness of breath  Shortness of breath    Chronic kidney disease (CKD), stage III (moderate)  Chronic kidney disease (CKD), stage III (moderate)    Tricuspid valve replaced  Tricuspid valve replaced    Mitral valve replaced  Mitral valve replaced    Atrial fibrillation, unspecified type  Atrial fibrillation, unspecified type    Essential hypertension  Essential hypertension    Acute on chronic heart failure with preserved ejection fraction  Acute on chronic heart failure with preserved ejection fraction    Mitral valve stenosis, unspecified etiology  Mitral valve stenosis, unspecified etiology    CHF (congestive heart failure)  CHF (congestive heart failure)    Gout  Gout    MIGUEL (obstructive sleep apnea)  MIGUEL (obstructive sleep apnea)    Atrial fibrillation  Atrial fibrillation           RECS:  diuresis per HF team  continue on NIV during sleep  wean daytime O2 as tolerated      Please call with any questions.    Em Merida MD  [facility] Pulmonary/Sleep Medicine  [phone]

## 2020-10-02 NOTE — PROGRESS NOTE ADULT - PROBLEM SELECTOR PLAN 1
- Would change back to oral diuretics and order echo to evaluate pulmonary pressures and RAP estimate to assist in further adjustments.   - daily standing weights and strict I&Os.

## 2020-10-03 LAB
ANION GAP SERPL CALC-SCNC: 14 MMOL/L — SIGNIFICANT CHANGE UP (ref 5–17)
APTT BLD: 106.9 SEC — HIGH (ref 27.5–35.5)
APTT BLD: 91.2 SEC — HIGH (ref 27.5–35.5)
BUN SERPL-MCNC: 97 MG/DL — HIGH (ref 7–23)
CALCIUM SERPL-MCNC: 11 MG/DL — HIGH (ref 8.4–10.5)
CHLORIDE SERPL-SCNC: 90 MMOL/L — LOW (ref 96–108)
CO2 SERPL-SCNC: 34 MMOL/L — HIGH (ref 22–31)
CREAT SERPL-MCNC: 2.14 MG/DL — HIGH (ref 0.5–1.3)
GLUCOSE SERPL-MCNC: 113 MG/DL — HIGH (ref 70–99)
HCT VFR BLD CALC: 31.4 % — LOW (ref 34.5–45)
HGB BLD-MCNC: 9.2 G/DL — LOW (ref 11.5–15.5)
MAGNESIUM SERPL-MCNC: 2 MG/DL — SIGNIFICANT CHANGE UP (ref 1.6–2.6)
MCHC RBC-ENTMCNC: 25.8 PG — LOW (ref 27–34)
MCHC RBC-ENTMCNC: 29.3 GM/DL — LOW (ref 32–36)
MCV RBC AUTO: 88.2 FL — SIGNIFICANT CHANGE UP (ref 80–100)
NRBC # BLD: 0 /100 WBCS — SIGNIFICANT CHANGE UP (ref 0–0)
PLATELET # BLD AUTO: 329 K/UL — SIGNIFICANT CHANGE UP (ref 150–400)
POTASSIUM SERPL-MCNC: 4.5 MMOL/L — SIGNIFICANT CHANGE UP (ref 3.5–5.3)
POTASSIUM SERPL-SCNC: 4.5 MMOL/L — SIGNIFICANT CHANGE UP (ref 3.5–5.3)
RBC # BLD: 3.56 M/UL — LOW (ref 3.8–5.2)
RBC # FLD: 18.4 % — HIGH (ref 10.3–14.5)
SARS-COV-2 RNA SPEC QL NAA+PROBE: SIGNIFICANT CHANGE UP
SODIUM SERPL-SCNC: 138 MMOL/L — SIGNIFICANT CHANGE UP (ref 135–145)
WBC # BLD: 8.67 K/UL — SIGNIFICANT CHANGE UP (ref 3.8–10.5)
WBC # FLD AUTO: 8.67 K/UL — SIGNIFICANT CHANGE UP (ref 3.8–10.5)

## 2020-10-03 PROCEDURE — 99233 SBSQ HOSP IP/OBS HIGH 50: CPT

## 2020-10-03 RX ADMIN — HEPARIN SODIUM 1000 UNIT(S)/HR: 5000 INJECTION INTRAVENOUS; SUBCUTANEOUS at 21:20

## 2020-10-03 RX ADMIN — HEPARIN SODIUM 1000 UNIT(S)/HR: 5000 INJECTION INTRAVENOUS; SUBCUTANEOUS at 14:45

## 2020-10-03 RX ADMIN — Medication 3 MILLILITER(S): at 05:58

## 2020-10-03 RX ADMIN — Medication 25 MILLIGRAM(S): at 05:58

## 2020-10-03 RX ADMIN — ATORVASTATIN CALCIUM 40 MILLIGRAM(S): 80 TABLET, FILM COATED ORAL at 21:14

## 2020-10-03 RX ADMIN — Medication 3 MILLILITER(S): at 23:50

## 2020-10-03 RX ADMIN — PANTOPRAZOLE SODIUM 40 MILLIGRAM(S): 20 TABLET, DELAYED RELEASE ORAL at 05:58

## 2020-10-03 RX ADMIN — Medication 100 MILLIGRAM(S): at 11:11

## 2020-10-03 RX ADMIN — BUMETANIDE 5 MG/HR: 0.25 INJECTION INTRAMUSCULAR; INTRAVENOUS at 11:09

## 2020-10-03 RX ADMIN — Medication 3 MILLILITER(S): at 17:44

## 2020-10-03 RX ADMIN — Medication 25 MILLIGRAM(S): at 17:44

## 2020-10-03 RX ADMIN — Medication 3 MILLILITER(S): at 11:11

## 2020-10-03 NOTE — PROGRESS NOTE ADULT - PROBLEM SELECTOR PLAN 1
- Would continue current diuretics and order echo to evaluate pulmonary pressures and RAP estimate to assist in further adjustments.   - daily standing weights and strict I&Os.

## 2020-10-03 NOTE — PROGRESS NOTE ADULT - ASSESSMENT
Ms. Alcantara is a 70 year old woman with HFpEF in the context of valvular heart disease, likely rheumatic, complicated by pulmonary hypertesnion, HTN (diagnosed in 30's and reportedly well controlled with medications), severe MR s/p mechanical MVR 1999 ([facility] with Dr. Anderson) and severe TR s/p TVR 2012 ([facility]), ?COPD (home 02; no prior tobacco use), AFib on Coumadin (s/p ablation), CKD 3 (b/l SCr ~1.3-1.6), MIGUEL and gout. Followed by Dr. Duarte (cardiologist) who presented to [facility] with SOB and chest tightness transferred on 8/18 for possible mitral intervention for possible mitral stenosis with Dr. Fernandez. Diuretics were held due to worsening renal dysfunction. ABG was consistent with hypercarbia and she reports previously needing Bipap but hasn't used in 10 years. TTE performed with normal LV function with limited views to assess mitral valve however gradients were within normal limits. Suspect she has a component of acute on chronic HFpEF as well as OHS/MIGUEL.    She is gradually improving with ongoing weight loss/diuresis, but she remains symptomatic, primarily with orthopnea.  Her renal function is poor, but stable. She is not interested in implantation of Cardiomems due to concern that she would not be able to manage the device at home.

## 2020-10-03 NOTE — PROGRESS NOTE ADULT - SUBJECTIVE AND OBJECTIVE BOX
SUBJECTIVE / OVERNIGHT EVENTS: pt denies chest pain, shortness of breath says she feels much better than before    MEDICATIONS  (STANDING):  albuterol/ipratropium for Nebulization 3 milliLiter(s) Nebulizer every 6 hours  allopurinol 100 milliGRAM(s) Oral daily  atorvastatin 40 milliGRAM(s) Oral at bedtime  buMETAnide Infusion 1 mG/Hr (5 mL/Hr) IV Continuous <Continuous>  heparin  Infusion. 1300 Unit(s)/Hr (13 mL/Hr) IV Continuous <Continuous>  lidocaine   Patch 1 Patch Transdermal every 24 hours  metoprolol tartrate 25 milliGRAM(s) Oral two times a day  pantoprazole    Tablet 40 milliGRAM(s) Oral before breakfast    MEDICATIONS  (PRN):  acetaminophen   Tablet .. 650 milliGRAM(s) Oral every 6 hours PRN Temp greater or equal to 38.5C (101.3F), Mild Pain (1 - 3)  heparin   Injectable 9000 Unit(s) IV Push every 6 hours PRN For aPTT less than 40  heparin   Injectable 4000 Unit(s) IV Push every 6 hours PRN For aPTT between 40 - 57        Eyes: No recent vision problems or eye pain.  Vital Signs Last 24 Hrs  T(C): 36.7 (03 Oct 2020 19:24), Max: 37 (03 Oct 2020 05:12)  T(F): 98.1 (03 Oct 2020 19:24), Max: 98.6 (03 Oct 2020 05:12)  HR: 83 (03 Oct 2020 19:24) (69 - 100)  BP: 107/58 (03 Oct 2020 19:24) (93/53 - 122/76)  BP(mean): 66 (03 Oct 2020 05:12) (66 - 66)  RR: 18 (03 Oct 2020 19:24) (18 - 18)  SpO2: 96% (03 Oct 2020 19:24) (96% - 100%)ENT: No congestion, ear pain, or sore throat.  Cardiovascular: No chest pain or palpation.  Respiratory: No cough, shortness of breath, congestion, or wheezing.  Gastrointestinal: No abdominal pain, nausea, vomiting, or diarrhea.  Genitourinary: No dysuria.  Musculoskeletal: No joint swelling.  Neurologic: No headache.    PHYSICAL EXAM:  GENERAL: NAD  EYES: EOMI, PERRLA  NECK: Supple, No JVD  CHEST/LUNG: crackles at bases  HEART:  S1 , S2 +  ABDOMEN: soft , bs+  EXTREMITIES:  edema+  NEUROLOGY:alert awake oriented     LABS:  10-03    138  |  90<L>  |  97<H>  ----------------------------<  113<H>  4.5   |  34<H>  |  2.14<H>    Ca    11.0<H>      03 Oct 2020 05:37  Mg     2.0     10-03      Creatinine Trend: 2.14 <--, 2.23 <--, 2.07 <--, 1.72 <--, 1.71 <--, 1.57 <--, 1.50 <--                        9.2    8.67  )-----------( 329      ( 03 Oct 2020 05:37 )             31.4     Urine Studies:              PTT - ( 03 Oct 2020 20:47 )  PTT:91.2 sec        PTT - ( 02 Oct 2020 08:51 )  PTT:82.4 sec        PT/INR - ( 30 Sep 2020 08:20 )   PT: 13.9 sec;   INR: 1.19 ratio         PTT - ( 01 Oct 2020 09:10 )  PTT:77.2 sec

## 2020-10-03 NOTE — PROVIDER CONTACT NOTE (OTHER) - ACTION/TREATMENT ORDERED:
will defer to the person who ordered the COVID nasal swab, but no need to be done right now. COVID nasal swab reordered and done by Kurt and sent

## 2020-10-03 NOTE — PROGRESS NOTE ADULT - SUBJECTIVE AND OBJECTIVE BOX
Subjective: No overnight events. She feels well today. She continues to have orthopnea, but improving.     Medications:  acetaminophen   Tablet .. 650 milliGRAM(s) Oral every 6 hours PRN  albuterol/ipratropium for Nebulization 3 milliLiter(s) Nebulizer every 6 hours  allopurinol 100 milliGRAM(s) Oral daily  atorvastatin 40 milliGRAM(s) Oral at bedtime  buMETAnide Infusion 1 mG/Hr IV Continuous <Continuous>  heparin   Injectable 9000 Unit(s) IV Push every 6 hours PRN  heparin   Injectable 4000 Unit(s) IV Push every 6 hours PRN  heparin  Infusion. 1300 Unit(s)/Hr IV Continuous <Continuous>  lidocaine   Patch 1 Patch Transdermal every 24 hours  metoprolol tartrate 25 milliGRAM(s) Oral two times a day  pantoprazole    Tablet 40 milliGRAM(s) Oral before breakfast      Physical Exam:    Vital Signs Last 24 Hrs  T(C): 36.9 (03 Oct 2020 13:08), Max: 37 (03 Oct 2020 05:12)  T(F): 98.4 (03 Oct 2020 13:08), Max: 98.6 (03 Oct 2020 05:12)  HR: 91 (03 Oct 2020 17:43) (69 - 100)  BP: 106/64 (03 Oct 2020 17:43) (93/53 - 122/76)  BP(mean): 66 (03 Oct 2020 05:12) (66 - 87)  RR: 18 (03 Oct 2020 13:08) (18 - 18)  SpO2: 100% (03 Oct 2020 13:08) (93% - 100%)    Daily     Daily Weight in k.3 (03 Oct 2020 07:16)    I&O's Summary    02 Oct 2020 07:01  -  03 Oct 2020 07:00  --------------------------------------------------------  IN: 2094 mL / OUT: 1750 mL / NET: 344 mL    03 Oct 2020 07:01  -  03 Oct 2020 19:20  --------------------------------------------------------  IN: 900 mL / OUT: 1050 mL / NET: -150 mL        General: No distress. Comfortable.  HEENT: EOM intact.  Neck: Neck supple. JVP not clearly elevated. No masses  Chest: Clear to auscultation bilaterally  CV: Mechanical S1 and normal S2. No rubs or gallops. Radial pulses normal. Trace edema in legs.   Abdomen: Soft, non-distended, non-tender  Skin: No rashes or skin breakdown  Neurology: Alert and oriented times three. Sensation intact  Psych: Affect normal    Labs:                        9.2    8.67  )-----------( 329      ( 03 Oct 2020 05:37 )             31.4     10-03    138  |  90<L>  |  97<H>  ----------------------------<  113<H>  4.5   |  34<H>  |  2.14<H>    Ca    11.0<H>      03 Oct 2020 05:37  Mg     2.0     10-03      PTT - ( 03 Oct 2020 12:57 )  PTT:106.9 sec

## 2020-10-04 LAB
ANION GAP SERPL CALC-SCNC: 14 MMOL/L — SIGNIFICANT CHANGE UP (ref 5–17)
APTT BLD: 97.9 SEC — HIGH (ref 27.5–35.5)
BUN SERPL-MCNC: 102 MG/DL — HIGH (ref 7–23)
CALCIUM SERPL-MCNC: 11.2 MG/DL — HIGH (ref 8.4–10.5)
CHLORIDE SERPL-SCNC: 88 MMOL/L — LOW (ref 96–108)
CO2 SERPL-SCNC: 33 MMOL/L — HIGH (ref 22–31)
CREAT SERPL-MCNC: 2.25 MG/DL — HIGH (ref 0.5–1.3)
GLUCOSE SERPL-MCNC: 117 MG/DL — HIGH (ref 70–99)
HCT VFR BLD CALC: 30.6 % — LOW (ref 34.5–45)
HGB BLD-MCNC: 9.2 G/DL — LOW (ref 11.5–15.5)
MCHC RBC-ENTMCNC: 26.8 PG — LOW (ref 27–34)
MCHC RBC-ENTMCNC: 30.1 GM/DL — LOW (ref 32–36)
MCV RBC AUTO: 89.2 FL — SIGNIFICANT CHANGE UP (ref 80–100)
NRBC # BLD: 0 /100 WBCS — SIGNIFICANT CHANGE UP (ref 0–0)
PLATELET # BLD AUTO: 336 K/UL — SIGNIFICANT CHANGE UP (ref 150–400)
POTASSIUM SERPL-MCNC: 4.2 MMOL/L — SIGNIFICANT CHANGE UP (ref 3.5–5.3)
POTASSIUM SERPL-SCNC: 4.2 MMOL/L — SIGNIFICANT CHANGE UP (ref 3.5–5.3)
RBC # BLD: 3.43 M/UL — LOW (ref 3.8–5.2)
RBC # FLD: 18.3 % — HIGH (ref 10.3–14.5)
SODIUM SERPL-SCNC: 135 MMOL/L — SIGNIFICANT CHANGE UP (ref 135–145)
WBC # BLD: 8.14 K/UL — SIGNIFICANT CHANGE UP (ref 3.8–10.5)
WBC # FLD AUTO: 8.14 K/UL — SIGNIFICANT CHANGE UP (ref 3.8–10.5)

## 2020-10-04 PROCEDURE — 93306 TTE W/DOPPLER COMPLETE: CPT | Mod: 26

## 2020-10-04 PROCEDURE — 99233 SBSQ HOSP IP/OBS HIGH 50: CPT

## 2020-10-04 RX ORDER — HEPARIN SODIUM 5000 [USP'U]/ML
9000 INJECTION INTRAVENOUS; SUBCUTANEOUS EVERY 6 HOURS
Refills: 0 | Status: DISCONTINUED | OUTPATIENT
Start: 2020-10-04 | End: 2020-10-08

## 2020-10-04 RX ORDER — HEPARIN SODIUM 5000 [USP'U]/ML
4000 INJECTION INTRAVENOUS; SUBCUTANEOUS EVERY 6 HOURS
Refills: 0 | Status: DISCONTINUED | OUTPATIENT
Start: 2020-10-04 | End: 2020-10-08

## 2020-10-04 RX ORDER — HEPARIN SODIUM 5000 [USP'U]/ML
1000 INJECTION INTRAVENOUS; SUBCUTANEOUS
Qty: 25000 | Refills: 0 | Status: DISCONTINUED | OUTPATIENT
Start: 2020-10-04 | End: 2020-10-08

## 2020-10-04 RX ADMIN — BUMETANIDE 5 MG/HR: 0.25 INJECTION INTRAMUSCULAR; INTRAVENOUS at 05:18

## 2020-10-04 RX ADMIN — BUMETANIDE 5 MG/HR: 0.25 INJECTION INTRAMUSCULAR; INTRAVENOUS at 21:28

## 2020-10-04 RX ADMIN — Medication 25 MILLIGRAM(S): at 05:14

## 2020-10-04 RX ADMIN — PANTOPRAZOLE SODIUM 40 MILLIGRAM(S): 20 TABLET, DELAYED RELEASE ORAL at 05:14

## 2020-10-04 RX ADMIN — ATORVASTATIN CALCIUM 40 MILLIGRAM(S): 80 TABLET, FILM COATED ORAL at 21:28

## 2020-10-04 RX ADMIN — Medication 100 MILLIGRAM(S): at 11:38

## 2020-10-04 RX ADMIN — BUMETANIDE 5 MG/HR: 0.25 INJECTION INTRAMUSCULAR; INTRAVENOUS at 14:33

## 2020-10-04 RX ADMIN — Medication 25 MILLIGRAM(S): at 17:10

## 2020-10-04 RX ADMIN — HEPARIN SODIUM 1000 UNIT(S)/HR: 5000 INJECTION INTRAVENOUS; SUBCUTANEOUS at 05:17

## 2020-10-04 RX ADMIN — HEPARIN SODIUM 1000 UNIT(S)/HR: 5000 INJECTION INTRAVENOUS; SUBCUTANEOUS at 15:09

## 2020-10-04 RX ADMIN — BUMETANIDE 5 MG/HR: 0.25 INJECTION INTRAMUSCULAR; INTRAVENOUS at 02:24

## 2020-10-04 RX ADMIN — Medication 3 MILLILITER(S): at 17:10

## 2020-10-04 RX ADMIN — Medication 3 MILLILITER(S): at 05:15

## 2020-10-04 RX ADMIN — Medication 3 MILLILITER(S): at 11:38

## 2020-10-04 RX ADMIN — Medication 3 MILLILITER(S): at 23:38

## 2020-10-04 NOTE — PROGRESS NOTE ADULT - SUBJECTIVE AND OBJECTIVE BOX
SUBJECTIVE / OVERNIGHT EVENTS: pt denies chest pain, shortness of breath says she feels much better than before      MEDICATIONS  (STANDING):  albuterol/ipratropium for Nebulization 3 milliLiter(s) Nebulizer every 6 hours  allopurinol 100 milliGRAM(s) Oral daily  atorvastatin 40 milliGRAM(s) Oral at bedtime  buMETAnide Infusion 1 mG/Hr (5 mL/Hr) IV Continuous <Continuous>  heparin  Infusion. 1000 Unit(s)/Hr (10 mL/Hr) IV Continuous <Continuous>  lidocaine   Patch 1 Patch Transdermal every 24 hours  metoprolol tartrate 25 milliGRAM(s) Oral two times a day  pantoprazole    Tablet 40 milliGRAM(s) Oral before breakfast    MEDICATIONS  (PRN):  acetaminophen   Tablet .. 650 milliGRAM(s) Oral every 6 hours PRN Temp greater or equal to 38.5C (101.3F), Mild Pain (1 - 3)  heparin   Injectable 9000 Unit(s) IV Push every 6 hours PRN For aPTT less than 40  heparin   Injectable 4000 Unit(s) IV Push every 6 hours PRN For aPTT between 40 - 57    Vital Signs Last 24 Hrs  T(C): 36.8 (04 Oct 2020 19:09), Max: 37.1 (04 Oct 2020 04:55)  T(F): 98.2 (04 Oct 2020 19:09), Max: 98.8 (04 Oct 2020 04:55)  HR: 80 (04 Oct 2020 19:09) (68 - 87)  BP: 143/74 (04 Oct 2020 19:09) (116/62 - 143/74)  BP(mean): --  RR: 18 (04 Oct 2020 19:09) (18 - 18)  SpO2: 93% (04 Oct 2020 19:09) (93% - 98%)    ENT: No congestion, ear pain, or sore throat.  Cardiovascular: No chest pain or palpation.  Respiratory: No cough, shortness of breath, congestion, or wheezing.  Gastrointestinal: No abdominal pain, nausea, vomiting, or diarrhea.  Genitourinary: No dysuria.  Musculoskeletal: No joint swelling.  Neurologic: No headache.    PHYSICAL EXAM:  GENERAL: NAD  EYES: EOMI, PERRLA  NECK: Supple, No JVD  CHEST/LUNG: crackles at bases  HEART:  S1 , S2 +  ABDOMEN: soft , bs+  EXTREMITIES:  edema+  NEUROLOGY:alert awake oriented     LABS:  10-04    135  |  88<L>  |  102<H>  ----------------------------<  117<H>  4.2   |  33<H>  |  2.25<H>    Ca    11.2<H>      04 Oct 2020 05:20  Mg     2.0     10-03      Creatinine Trend: 2.25 <--, 2.14 <--, 2.23 <--, 2.07 <--, 1.72 <--, 1.71 <--, 1.57 <--                        9.2    8.14  )-----------( 336      ( 04 Oct 2020 05:20 )             30.6     Urine Studies:              PTT - ( 04 Oct 2020 03:18 )  PTT:97.9 sec

## 2020-10-04 NOTE — PROGRESS NOTE ADULT - SUBJECTIVE AND OBJECTIVE BOX
Subjective: No current complaints. She remains orthopneic.     Medications:  acetaminophen   Tablet .. 650 milliGRAM(s) Oral every 6 hours PRN  albuterol/ipratropium for Nebulization 3 milliLiter(s) Nebulizer every 6 hours  allopurinol 100 milliGRAM(s) Oral daily  atorvastatin 40 milliGRAM(s) Oral at bedtime  buMETAnide Infusion 1 mG/Hr IV Continuous <Continuous>  heparin   Injectable 9000 Unit(s) IV Push every 6 hours PRN  heparin   Injectable 4000 Unit(s) IV Push every 6 hours PRN  heparin  Infusion. 1300 Unit(s)/Hr IV Continuous <Continuous>  lidocaine   Patch 1 Patch Transdermal every 24 hours  metoprolol tartrate 25 milliGRAM(s) Oral two times a day  pantoprazole    Tablet 40 milliGRAM(s) Oral before breakfast      Physical Exam:    Vital Signs Last 24 Hrs  T(C): 37.1 (04 Oct 2020 04:55), Max: 37.1 (04 Oct 2020 04:55)  T(F): 98.8 (04 Oct 2020 04:55), Max: 98.8 (04 Oct 2020 04:55)  HR: 84 (04 Oct 2020 09:15) (68 - 100)  BP: 116/62 (04 Oct 2020 04:55) (106/64 - 122/76)  RR: 18 (04 Oct 2020 04:55) (18 - 18)  SpO2: 95% (04 Oct 2020 09:15) (95% - 100%)     Daily Weight in k.5 (04 Oct 2020 07:09)    I&O's Summary    03 Oct 2020 07:01  -  04 Oct 2020 07:00  --------------------------------------------------------  IN: 1180 mL / OUT: 1650 mL / NET: -470 mL    04 Oct 2020 07:01  -  04 Oct 2020 11:16  --------------------------------------------------------  IN: 240 mL / OUT: 400 mL / NET: -160 mL    General: No distress. Comfortable.  HEENT: EOM intact.  Neck: Neck supple. JVP not clearly elevated. No masses  Chest: Clear to auscultation bilaterally  CV: Distant heart sounds. Normal S1 and S2. No rubs or gallops. Radial pulses normal. Trace edema in legs.   Abdomen: Soft, non-distended, non-tender  Skin: No rashes or skin breakdown  Neurology: Alert and oriented times three. Sensation intact  Psych: Affect normal    Labs:                        9.2    8.14  )-----------( 336      ( 04 Oct 2020 05:20 )             30.6     10-04    135  |  88<L>  |  102<H>  ----------------------------<  117<H>  4.2   |  33<H>  |  2.25<H>    Ca    11.2<H>      04 Oct 2020 05:20  Mg     2.0     10-03      PTT - ( 04 Oct 2020 03:18 )  PTT:97.9 sec

## 2020-10-04 NOTE — PROGRESS NOTE ADULT - ASSESSMENT
Ms. Alcantara is a 70 year old woman with HFpEF in the context of valvular heart disease, likely rheumatic, complicated by pulmonary hypertesnion, HTN (diagnosed in 30's and reportedly well controlled with medications), severe MR s/p mechanical MVR 1999 ([facility] with Dr. Anderson) and severe TR s/p TVR 2012 ([facility]), ?COPD (home 02; no prior tobacco use), AFib on Coumadin (s/p ablation), CKD 3 (b/l SCr ~1.3-1.6), MIGUEL and gout. Followed by Dr. Duarte (cardiologist) who presented to [facility] with SOB and chest tightness transferred on 8/18 for possible mitral intervention for possible mitral stenosis with Dr. Fernandez. Diuretics were held due to worsening renal dysfunction. ABG was consistent with hypercarbia and she reports previously needing Bipap but hasn't used in 10 years. TTE performed with normal LV function with limited views to assess mitral valve however gradients were within normal limits. Suspect she has a component of acute on chronic HFpEF as well as OHS/MIGUEL.    She is gradually improving with ongoing weight loss/diuresis, but she remains symptomatic, primarily with orthopnea.  Her renal function is poor, but stable. She is not interested in implantation of Cardiomems due to concern that she would not be able to manage the device at home.

## 2020-10-05 LAB
ALBUMIN SERPL ELPH-MCNC: 4.6 G/DL — SIGNIFICANT CHANGE UP (ref 3.3–5)
ALP SERPL-CCNC: 112 U/L — SIGNIFICANT CHANGE UP (ref 40–120)
ALT FLD-CCNC: 14 U/L — SIGNIFICANT CHANGE UP (ref 10–45)
ANION GAP SERPL CALC-SCNC: 15 MMOL/L — SIGNIFICANT CHANGE UP (ref 5–17)
APTT BLD: 88.4 SEC — HIGH (ref 27.5–35.5)
AST SERPL-CCNC: 25 U/L — SIGNIFICANT CHANGE UP (ref 10–40)
BILIRUB SERPL-MCNC: 0.6 MG/DL — SIGNIFICANT CHANGE UP (ref 0.2–1.2)
BUN SERPL-MCNC: 108 MG/DL — HIGH (ref 7–23)
CALCIUM SERPL-MCNC: 11.1 MG/DL — HIGH (ref 8.4–10.5)
CHLORIDE SERPL-SCNC: 89 MMOL/L — LOW (ref 96–108)
CO2 SERPL-SCNC: 34 MMOL/L — HIGH (ref 22–31)
CREAT SERPL-MCNC: 2.25 MG/DL — HIGH (ref 0.5–1.3)
GLUCOSE SERPL-MCNC: 135 MG/DL — HIGH (ref 70–99)
HCT VFR BLD CALC: 32 % — LOW (ref 34.5–45)
HGB BLD-MCNC: 9.5 G/DL — LOW (ref 11.5–15.5)
MAGNESIUM SERPL-MCNC: 2.2 MG/DL — SIGNIFICANT CHANGE UP (ref 1.6–2.6)
MCHC RBC-ENTMCNC: 26.2 PG — LOW (ref 27–34)
MCHC RBC-ENTMCNC: 29.7 GM/DL — LOW (ref 32–36)
MCV RBC AUTO: 88.4 FL — SIGNIFICANT CHANGE UP (ref 80–100)
NRBC # BLD: 0 /100 WBCS — SIGNIFICANT CHANGE UP (ref 0–0)
PHOSPHATE SERPL-MCNC: 4.3 MG/DL — SIGNIFICANT CHANGE UP (ref 2.5–4.5)
PLATELET # BLD AUTO: 352 K/UL — SIGNIFICANT CHANGE UP (ref 150–400)
POTASSIUM SERPL-MCNC: 4.2 MMOL/L — SIGNIFICANT CHANGE UP (ref 3.5–5.3)
POTASSIUM SERPL-SCNC: 4.2 MMOL/L — SIGNIFICANT CHANGE UP (ref 3.5–5.3)
PROT SERPL-MCNC: 9.1 G/DL — HIGH (ref 6–8.3)
RBC # BLD: 3.62 M/UL — LOW (ref 3.8–5.2)
RBC # FLD: 18.2 % — HIGH (ref 10.3–14.5)
SODIUM SERPL-SCNC: 138 MMOL/L — SIGNIFICANT CHANGE UP (ref 135–145)
WBC # BLD: 9.61 K/UL — SIGNIFICANT CHANGE UP (ref 3.8–10.5)
WBC # FLD AUTO: 9.61 K/UL — SIGNIFICANT CHANGE UP (ref 3.8–10.5)

## 2020-10-05 PROCEDURE — 99233 SBSQ HOSP IP/OBS HIGH 50: CPT

## 2020-10-05 RX ORDER — POLYETHYLENE GLYCOL 3350 17 G/17G
17 POWDER, FOR SOLUTION ORAL DAILY
Refills: 0 | Status: DISCONTINUED | OUTPATIENT
Start: 2020-10-05 | End: 2020-10-19

## 2020-10-05 RX ORDER — SENNA PLUS 8.6 MG/1
2 TABLET ORAL AT BEDTIME
Refills: 0 | Status: DISCONTINUED | OUTPATIENT
Start: 2020-10-05 | End: 2020-10-19

## 2020-10-05 RX ADMIN — POLYETHYLENE GLYCOL 3350 17 GRAM(S): 17 POWDER, FOR SOLUTION ORAL at 15:21

## 2020-10-05 RX ADMIN — Medication 25 MILLIGRAM(S): at 17:21

## 2020-10-05 RX ADMIN — SENNA PLUS 2 TABLET(S): 8.6 TABLET ORAL at 21:27

## 2020-10-05 RX ADMIN — ATORVASTATIN CALCIUM 40 MILLIGRAM(S): 80 TABLET, FILM COATED ORAL at 21:27

## 2020-10-05 RX ADMIN — Medication 25 MILLIGRAM(S): at 05:57

## 2020-10-05 RX ADMIN — PANTOPRAZOLE SODIUM 40 MILLIGRAM(S): 20 TABLET, DELAYED RELEASE ORAL at 05:58

## 2020-10-05 RX ADMIN — Medication 100 MILLIGRAM(S): at 10:06

## 2020-10-05 RX ADMIN — HEPARIN SODIUM 1000 UNIT(S)/HR: 5000 INJECTION INTRAVENOUS; SUBCUTANEOUS at 06:38

## 2020-10-05 RX ADMIN — Medication 3 MILLILITER(S): at 12:07

## 2020-10-05 RX ADMIN — Medication 3 MILLILITER(S): at 05:58

## 2020-10-05 RX ADMIN — Medication 3 MILLILITER(S): at 17:20

## 2020-10-05 NOTE — PROGRESS NOTE ADULT - PROBLEM SELECTOR PLAN 1
- Hold diuretics given MISAEL with signs of volume depletion  - Will tentatively plan for RHC + LVEDP tomorrow, keep NPO at MN

## 2020-10-05 NOTE — CONSULT NOTE ADULT - REASON FOR ADMISSION
mitral regurgitation, heart failure

## 2020-10-05 NOTE — PROGRESS NOTE ADULT - SUBJECTIVE AND OBJECTIVE BOX
Subjective:  Reports increased dizziness/fatigue today. Still with dyspnea on short exertion.     Medications:  acetaminophen   Tablet .. 650 milliGRAM(s) Oral every 6 hours PRN  albuterol/ipratropium for Nebulization 3 milliLiter(s) Nebulizer every 6 hours  allopurinol 100 milliGRAM(s) Oral daily  atorvastatin 40 milliGRAM(s) Oral at bedtime  heparin   Injectable 9000 Unit(s) IV Push every 6 hours PRN  heparin   Injectable 4000 Unit(s) IV Push every 6 hours PRN  heparin  Infusion. 1000 Unit(s)/Hr IV Continuous <Continuous>  lidocaine   Patch 1 Patch Transdermal every 24 hours  metoprolol tartrate 25 milliGRAM(s) Oral two times a day  pantoprazole    Tablet 40 milliGRAM(s) Oral before breakfast    Vitals:  T(C): 36.7 (10-05-20 @ 12:12), Max: 36.9 (10-05-20 @ 05:12)  HR: 84 (10-05-20 @ 12:12) (79 - 99)  BP: 124/74 (10-05-20 @ 12:12) (116/75 - 143/74)  BP(mean): 91 (10-05-20 @ 12:12) (91 - 91)  RR: 18 (10-05-20 @ 12:12) (18 - 18)  SpO2: 96% (10-05-20 @ 12:12) (93% - 97%)    Daily     Daily Weight in k.1 (05 Oct 2020 07:10)        I&O's Summary    04 Oct 2020 07:01  -  05 Oct 2020 07:00  --------------------------------------------------------  IN: 960 mL / OUT: 1550 mL / NET: -590 mL    05 Oct 2020 07:  -  05 Oct 2020 14:28  --------------------------------------------------------  IN: 425 mL / OUT: 400 mL / NET: 25 mL        Physical Exam:  Appearance: No Acute Distress  HEENT: JVP non elevated  Cardiovascular: RRR, Normal S1 S2, No murmurs/rubs/gallops  Respiratory: Clear to auscultation bilaterally  Gastrointestinal: Soft, Non-tender, non-distended	  Skin: no skin lesions  Neurologic: Non-focal  Extremities: trace LE edema, warm and well perfused  Psychiatry: A & O x 3, Mood & affect appropriate      Labs:                        9.5    9.61  )-----------( 352      ( 05 Oct 2020 05:58 )             32.0     10-05    138  |  89<L>  |  108<H>  ----------------------------<  135<H>  4.2   |  34<H>  |  2.25<H>    Ca    11.1<H>      05 Oct 2020 05:58  Phos  4.3     10-05  Mg     2.2     10-05    TPro  9.1<H>  /  Alb  4.6  /  TBili  0.6  /  DBili  x   /  AST  25  /  ALT  14  /  AlkPhos  112  10-05      PTT - ( 05 Oct 2020 05:58 )  PTT:88.4 sec

## 2020-10-05 NOTE — PROGRESS NOTE ADULT - ASSESSMENT
Ms. Alcantara is a 70 year old woman with HFpEF in the context of valvular heart disease, likely rheumatic, complicated by pulmonary hypertension, morbid obesity (BMI 41), HTN (diagnosed in 30's and reportedly well controlled with medications), severe MR s/p mechanical MVR 1999 ([facility] with Dr. Anderson) and severe TR s/p TVR 2012 ([facility]), ?COPD (home 02; no prior tobacco use), AFib on Coumadin (s/p ablation), CKD 3 (b/l SCr ~1.3-1.6), MIGUEL and gout. followed by Dr. Duarte (cardiologist) who presented to [facility] with SOB and chest tightness transferred on 8/18 for possible mitral intervention for possible mitral stenosis with Dr. Fernandez. TTE performed with normal LV function with limited views to assess mitral valve however gradients were not significantly. Suspect she has a component of acute on chronic HFpEF as well as OHS/MIGUEL.    She has had worsening MISAEL with dizziness and low estimated RA pressure on TTE today likely from overdiuresis but continues to report dyspnea on minimal exertion. Will tentatively plan for RHC with LVEDP to measure filling pressures, cardiac output, and LVEDP/PCWP for invasive assessment of mitral stenosis estimate.  She is not interested in implantation of Cardiomems due to concern that she would not be able to manage the device at home.     Recent studies  TTE 10/4: images very limited, LV non-dilated, visual LVEF ~50%, dilated RV with at least moderate dysfunction, collapsing IVC, LVOT VTI 13 cm, mean gradient 8 mmHg across MV

## 2020-10-05 NOTE — CONSULT NOTE ADULT - ASSESSMENT
70 year old woman with chf/ pulmonary hypertension, morbid obesity (BMI 41), HTN severe MR s/p mechanical MVR 1999 ([facility] with Dr. Anderson) and severe TR s/p TVR 2012 ([facility]), , AFib on Coumadin (s/p ablation), CKD 3 (b/l SCr ~1.3-1.6), MIGUEL and gout presented to [facility] with SOB and chest tightness transferred. required diuretics for chf. dx with mitral valve disease. required diuretics  now cr noticed to be elevated to 2.2 range.     1- MISAEL on ckd III   2- hypercalcemia   3- chf   4- anemia     misael in setting of diuretics use likely. diuretics on hold but given tachypnea agree with chf to evaluate R heart pressures via RHC  pt also with hypercalcemia which is likely due to immobilization given ca + was wnl until recently, however given anemia and worsening renal function to have spep/ipep/bence jones  to have intact pth and phos and vit d25 , 1,25 level   to have pth-rp  to have uric acid   renal sono pre and post void bladder sono

## 2020-10-05 NOTE — CONSULT NOTE ADULT - SUBJECTIVE AND OBJECTIVE BOX
seen and examined full consult to follow seen and examined full consult to follow     addendum:    [facility]  [phone]  NEPHROLOGY      INITIAL CONSULT NOTE  --------------------------------------------------------------------------------  HPI:    70 year old woman with chf/ pulmonary hypertension, morbid obesity (BMI 41), HTN severe MR s/p mechanical MVR 1999 ([facility] with Dr. Anderson) and severe TR s/p TVR 2012 ([facility]), , AFib on Coumadin (s/p ablation), CKD 3 (b/l SCr ~1.3-1.6), MIGUEL and gout presented to [facility] with SOB and chest tightness transferred. required diuretics for chf. now cr noticed to be elevated to 2.2 range. renal consult called.     PAST HISTORY  --------------------------------------------------------------------------------  PAST MEDICAL & SURGICAL HISTORY:  COPD (chronic obstructive pulmonary disease)    Hypertension    CHF (congestive heart failure)    Tricuspid valve replaced    Mitral valve replaced    Gout    HTN (hypertension)    CHF (congestive heart failure)    COPD (chronic obstructive pulmonary disease)  on home O2    MIGUEL (obstructive sleep apnea)    Pulmonary hypertension    Atrial fibrillation  S/P Ablation    No significant past surgical history    Tricuspid valve replaced  mechanical    History of mitral valve replacement with mechanical valve      FAMILY HISTORY:  Family history of hypertension (Mother)    Family history of stroke    Family history of hypertension (Father, Sibling)      PAST SOCIAL HISTORY:    no tobacco no alcohol     ALLERGIES & MEDICATIONS  --------------------------------------------------------------------------------  Allergies    No Known Allergies    Intolerances      Standing Inpatient Medications  albuterol/ipratropium for Nebulization 3 milliLiter(s) Nebulizer every 6 hours  allopurinol 100 milliGRAM(s) Oral daily  atorvastatin 40 milliGRAM(s) Oral at bedtime  heparin  Infusion. 1000 Unit(s)/Hr IV Continuous <Continuous>  lidocaine   Patch 1 Patch Transdermal every 24 hours  metoprolol tartrate 25 milliGRAM(s) Oral two times a day  pantoprazole    Tablet 40 milliGRAM(s) Oral before breakfast  polyethylene glycol 3350 17 Gram(s) Oral daily  senna 2 Tablet(s) Oral at bedtime    PRN Inpatient Medications  acetaminophen   Tablet .. 650 milliGRAM(s) Oral every 6 hours PRN  heparin   Injectable 9000 Unit(s) IV Push every 6 hours PRN  heparin   Injectable 4000 Unit(s) IV Push every 6 hours PRN      REVIEW OF SYSTEMS  --------------------------------------------------------------------------------  Gen: No  fevers/chills   Skin: No rashes  Head/Eyes/Ears/Mouth: No headache; Normal hearing;  No sinus pain/discomfort, sore throat  Respiratory: No dyspnea, cough, wheezing, hemoptysis  CV: No chest pain, orthopnea +   GI: No abdominal pain, diarrhea, nausea, vomiting, melena  : No dysuria, decrease urination or hesitancy urinating  hematuria, nocturia  MSK: No joint pain/swelling; no back pain  Neuro: No dizziness/lightheadedness  also with no edema     All other systems were reviewed and are negative, except as noted.    VITALS/PHYSICAL EXAM  --------------------------------------------------------------------------------  T(C): 36.6 (10-05-20 @ 20:26), Max: 36.9 (10-05-20 @ 05:12)  HR: 79 (10-05-20 @ 20:26) (79 - 99)  BP: 103/66 (10-05-20 @ 20:26) (103/66 - 124/74)  RR: 18 (10-05-20 @ 20:26) (18 - 18)  SpO2: 97% (10-05-20 @ 20:26) (94% - 97%)  Wt(kg): --        10-04-20 @ 07:01  -  10-05-20 @ 07:00  --------------------------------------------------------  IN: 960 mL / OUT: 1550 mL / NET: -590 mL    10-05-20 @ 07:01  -  10-05-20 @ 21:46  --------------------------------------------------------  IN: 485 mL / OUT: 400 mL / NET: 85 mL      Physical Exam:  	Gen: seem to be tachypneic when seen   	+ JVD  	Pulm: decrease bs  no rales or ronchi or wheezing  	CV: RRR, S1S2; no rub  	Back: No CVA tenderness  	Abd: +BS, soft, nontender/nondistended  	: No suprapubic tenderness  	UE: Warm, no cyanosis  no clubbing,  no edema  	LE: Warm, no cyanosis  no clubbing, no edema  	Neuro: alert and oriented. speech coherent   	Skin: Warm, no decrease skin turgor       LABS/STUDIES  --------------------------------------------------------------------------------              9.5    9.61  >-----------<  352      [10-05-20 @ 05:58]              32.0     138  |  89  |  108  ----------------------------<  135      [10-05-20 @ 05:58]  4.2   |  34  |  2.25        Ca     11.1     [10-05-20 @ 05:58]      Mg     2.2     [10-05-20 @ 05:58]      Phos  4.3     [10-05-20 @ 05:58]    TPro  9.1  /  Alb  4.6  /  TBili  0.6  /  DBili  x   /  AST  25  /  ALT  14  /  AlkPhos  112  [10-05-20 @ 05:58]      PTT: 88.4       [10-05-20 @ 05:58]      Creatinine Trend:  SCr 2.25 [10-05 @ 05:58]  SCr 2.25 [10-04 @ 05:20]  SCr 2.14 [10-03 @ 05:37]  SCr 2.23 [10-02 @ 05:11]  SCr 2.07 [10-01 @ 05:20]    Urinalysis - [09-19-20 @ 04:15]      Color Light Yellow / Appearance Clear / SG 1.016 / pH 6.0      Gluc Negative / Ketone Negative  / Bili Negative / Urobili <2 mg/dL       Blood Negative / Protein Negative / Leuk Est Negative / Nitrite Negative      RBC  / WBC  / Hyaline  / Gran  / Sq Epi  / Non Sq Epi  / Bacteria       HbA1c 5.9      [11-11-17 @ 14:00]  TSH 1.92      [09-19-20 @ 10:52]    HCV 0.52, Nonreact      [09-16-20 @ 09:17]

## 2020-10-05 NOTE — PROGRESS NOTE ADULT - PROBLEM SELECTOR PLAN 4
- Continue metoprolol  to achieve HR 60-70 for mitral stenosis.  - Continue a/c, will eventually need to switch heparin to coumadin  - Mean gradient 8 mmHg, no intervention per CTS

## 2020-10-05 NOTE — PROGRESS NOTE ADULT - SUBJECTIVE AND OBJECTIVE BOX
SUBJECTIVE / OVERNIGHT EVENTS: pt denies chest pain, shortness of breath says she feels much better than before      MEDICATIONS  (STANDING):  albuterol/ipratropium for Nebulization 3 milliLiter(s) Nebulizer every 6 hours  allopurinol 100 milliGRAM(s) Oral daily  atorvastatin 40 milliGRAM(s) Oral at bedtime  heparin  Infusion. 1000 Unit(s)/Hr (10 mL/Hr) IV Continuous <Continuous>  lidocaine   Patch 1 Patch Transdermal every 24 hours  metoprolol tartrate 25 milliGRAM(s) Oral two times a day  pantoprazole    Tablet 40 milliGRAM(s) Oral before breakfast    MEDICATIONS  (PRN):  acetaminophen   Tablet .. 650 milliGRAM(s) Oral every 6 hours PRN Temp greater or equal to 38.5C (101.3F), Mild Pain (1 - 3)  heparin   Injectable 9000 Unit(s) IV Push every 6 hours PRN For aPTT less than 40  heparin   Injectable 4000 Unit(s) IV Push every 6 hours PRN For aPTT between 40 - 57    Vital Signs Last 24 Hrs  T(C): 36.7 (05 Oct 2020 12:12), Max: 36.9 (05 Oct 2020 05:12)  T(F): 98.1 (05 Oct 2020 12:12), Max: 98.4 (05 Oct 2020 05:12)  HR: 84 (05 Oct 2020 12:12) (79 - 99)  BP: 124/74 (05 Oct 2020 12:12) (116/75 - 143/74)  BP(mean): 91 (05 Oct 2020 12:12) (91 - 91)  RR: 18 (05 Oct 2020 12:12) (18 - 18)  SpO2: 96% (05 Oct 2020 12:12) (93% - 97%)    ENT: No congestion, ear pain, or sore throat.  Cardiovascular: No chest pain or palpation.  Respiratory: No cough, shortness of breath, congestion, or wheezing.  Gastrointestinal: No abdominal pain, nausea, vomiting, or diarrhea.  Genitourinary: No dysuria.  Musculoskeletal: No joint swelling.  Neurologic: No headache.    PHYSICAL EXAM:  GENERAL: NAD  EYES: EOMI, PERRLA  NECK: Supple, No JVD  CHEST/LUNG: crackles at bases  HEART:  S1 , S2 +  ABDOMEN: soft , bs+  EXTREMITIES:  edema+  NEUROLOGY:alert awake oriented     LABS:  10-05    138  |  89<L>  |  108<H>  ----------------------------<  135<H>  4.2   |  34<H>  |  2.25<H>    Ca    11.1<H>      05 Oct 2020 05:58  Phos  4.3     10-05  Mg     2.2     10-05    TPro  9.1<H>  /  Alb  4.6  /  TBili  0.6  /  DBili      /  AST  25  /  ALT  14  /  AlkPhos  112  10-05    Creatinine Trend: 2.25 <--, 2.25 <--, 2.14 <--, 2.23 <--, 2.07 <--, 1.72 <--, 1.71 <--                        9.5    9.61  )-----------( 352      ( 05 Oct 2020 05:58 )             32.0     Urine Studies:            LIVER FUNCTIONS - ( 05 Oct 2020 05:58 )  Alb: 4.6 g/dL / Pro: 9.1 g/dL / ALK PHOS: 112 U/L / ALT: 14 U/L / AST: 25 U/L / GGT: x           PTT - ( 05 Oct 2020 05:58 )  PTT:88.4 sec

## 2020-10-06 LAB
% ALBUMIN: 47.9 % — SIGNIFICANT CHANGE UP
% ALPHA 1: 5.6 % — SIGNIFICANT CHANGE UP
% ALPHA 2: 7.8 % — SIGNIFICANT CHANGE UP
% BETA: 15.1 % — SIGNIFICANT CHANGE UP
% GAMMA: 23.6 % — SIGNIFICANT CHANGE UP
24R-OH-CALCIDIOL SERPL-MCNC: 79 NG/ML — SIGNIFICANT CHANGE UP (ref 30–80)
ALBUMIN SERPL ELPH-MCNC: 4.4 G/DL — SIGNIFICANT CHANGE UP (ref 3.6–5.5)
ALBUMIN/GLOB SERPL ELPH: 0.9 RATIO — SIGNIFICANT CHANGE UP
ALPHA1 GLOB SERPL ELPH-MCNC: 0.5 G/DL — HIGH (ref 0.1–0.4)
ALPHA2 GLOB SERPL ELPH-MCNC: 0.7 G/DL — SIGNIFICANT CHANGE UP (ref 0.5–1)
ANION GAP SERPL CALC-SCNC: 15 MMOL/L — SIGNIFICANT CHANGE UP (ref 5–17)
B-GLOBULIN SERPL ELPH-MCNC: 1.4 G/DL — HIGH (ref 0.5–1)
BUN SERPL-MCNC: 117 MG/DL — HIGH (ref 7–23)
CALCIUM SERPL-MCNC: 10.5 MG/DL — SIGNIFICANT CHANGE UP (ref 8.4–10.5)
CALCIUM SERPL-MCNC: 10.7 MG/DL — HIGH (ref 8.4–10.5)
CHLORIDE SERPL-SCNC: 89 MMOL/L — LOW (ref 96–108)
CO2 SERPL-SCNC: 33 MMOL/L — HIGH (ref 22–31)
CREAT SERPL-MCNC: 2.35 MG/DL — HIGH (ref 0.5–1.3)
GAMMA GLOBULIN: 2.1 G/DL — HIGH (ref 0.6–1.6)
GLUCOSE SERPL-MCNC: 132 MG/DL — HIGH (ref 70–99)
HCT VFR BLD CALC: 31.1 % — LOW (ref 34.5–45)
HGB BLD-MCNC: 9.1 G/DL — LOW (ref 11.5–15.5)
INTERPRETATION SERPL IFE-IMP: SIGNIFICANT CHANGE UP
MAGNESIUM SERPL-MCNC: 2.2 MG/DL — SIGNIFICANT CHANGE UP (ref 1.6–2.6)
MCHC RBC-ENTMCNC: 26 PG — LOW (ref 27–34)
MCHC RBC-ENTMCNC: 29.3 GM/DL — LOW (ref 32–36)
MCV RBC AUTO: 88.9 FL — SIGNIFICANT CHANGE UP (ref 80–100)
NRBC # BLD: 0 /100 WBCS — SIGNIFICANT CHANGE UP (ref 0–0)
PLATELET # BLD AUTO: 342 K/UL — SIGNIFICANT CHANGE UP (ref 150–400)
POTASSIUM SERPL-MCNC: 4.3 MMOL/L — SIGNIFICANT CHANGE UP (ref 3.5–5.3)
POTASSIUM SERPL-SCNC: 4.3 MMOL/L — SIGNIFICANT CHANGE UP (ref 3.5–5.3)
PROT PATTERN SERPL ELPH-IMP: SIGNIFICANT CHANGE UP
PROT SERPL-MCNC: 9.1 G/DL — HIGH (ref 6–8.3)
PTH-INTACT FLD-MCNC: 183 PG/ML — HIGH (ref 15–65)
RBC # BLD: 3.5 M/UL — LOW (ref 3.8–5.2)
RBC # FLD: 18 % — HIGH (ref 10.3–14.5)
SODIUM SERPL-SCNC: 137 MMOL/L — SIGNIFICANT CHANGE UP (ref 135–145)
VIT D25+D1,25 OH+D1,25 PNL SERPL-MCNC: 33.2 PG/ML — SIGNIFICANT CHANGE UP (ref 19.9–79.3)
WBC # BLD: 10.44 K/UL — SIGNIFICANT CHANGE UP (ref 3.8–10.5)
WBC # FLD AUTO: 10.44 K/UL — SIGNIFICANT CHANGE UP (ref 3.8–10.5)

## 2020-10-06 PROCEDURE — 93010 ELECTROCARDIOGRAM REPORT: CPT

## 2020-10-06 PROCEDURE — 93453 R&L HRT CATH W/VENTRICLGRPHY: CPT | Mod: 26

## 2020-10-06 PROCEDURE — 99152 MOD SED SAME PHYS/QHP 5/>YRS: CPT

## 2020-10-06 RX ADMIN — Medication 3 MILLILITER(S): at 17:29

## 2020-10-06 RX ADMIN — Medication 3 MILLILITER(S): at 06:04

## 2020-10-06 RX ADMIN — Medication 3 MILLILITER(S): at 23:12

## 2020-10-06 RX ADMIN — Medication 3 MILLILITER(S): at 13:44

## 2020-10-06 RX ADMIN — Medication 3 MILLILITER(S): at 00:06

## 2020-10-06 RX ADMIN — SENNA PLUS 2 TABLET(S): 8.6 TABLET ORAL at 21:21

## 2020-10-06 RX ADMIN — Medication 25 MILLIGRAM(S): at 06:03

## 2020-10-06 RX ADMIN — Medication 25 MILLIGRAM(S): at 17:29

## 2020-10-06 RX ADMIN — Medication 100 MILLIGRAM(S): at 13:44

## 2020-10-06 RX ADMIN — ATORVASTATIN CALCIUM 40 MILLIGRAM(S): 80 TABLET, FILM COATED ORAL at 21:21

## 2020-10-06 RX ADMIN — PANTOPRAZOLE SODIUM 40 MILLIGRAM(S): 20 TABLET, DELAYED RELEASE ORAL at 06:04

## 2020-10-06 NOTE — PROGRESS NOTE ADULT - ASSESSMENT
70 year old woman with chf/ pulmonary hypertension, morbid obesity (BMI 41), HTN severe MR s/p mechanical MVR 1999 ([facility] with Dr. Anderson) and severe TR s/p TVR 2012 ([facility]), , AFib on Coumadin (s/p ablation), CKD 3 (b/l SCr ~1.3-1.6), MIGUEL and gout presented to [facility] with SOB and chest tightness transferred. required diuretics for chf. dx with mitral valve disease. required diuretics  now cr noticed to be elevated to 2.2 range.     1- MISAEL on ckd III   2- hypercalcemia   3- chf   4- anemia     misael in setting of diuretics use likely. diuretics on hold but given tachypnea agree with chf to evaluate R heart pressures via RHC awaiting report. holding diuretics for now   primary hyperparathyroid in setting of hypercalcemia and high intact pth noted. trend calcium over next day to see if improvement first prior to further testing given ca+ with pth is 10,5 today    anemia trend hb   renal sono pre and post void bladder sono

## 2020-10-06 NOTE — PROGRESS NOTE ADULT - SUBJECTIVE AND OBJECTIVE BOX
[facility]   [phone]  RENAL FOLLOW UP NOTE  --------------------------------------------------------------------------------  Chief Complaint:    24 hour events/subjective:    seen earlier. states breathing is slightly better.       PAST HISTORY  --------------------------------------------------------------------------------  No significant changes to PMH, PSH, FHx, SHx, unless otherwise noted    ALLERGIES & MEDICATIONS  --------------------------------------------------------------------------------  Allergies    No Known Allergies    Intolerances      Standing Inpatient Medications  albuterol/ipratropium for Nebulization 3 milliLiter(s) Nebulizer every 6 hours  allopurinol 100 milliGRAM(s) Oral daily  atorvastatin 40 milliGRAM(s) Oral at bedtime  heparin  Infusion. 1000 Unit(s)/Hr IV Continuous <Continuous>  lidocaine   Patch 1 Patch Transdermal every 24 hours  metoprolol tartrate 25 milliGRAM(s) Oral two times a day  pantoprazole    Tablet 40 milliGRAM(s) Oral before breakfast  polyethylene glycol 3350 17 Gram(s) Oral daily  senna 2 Tablet(s) Oral at bedtime    PRN Inpatient Medications  acetaminophen   Tablet .. 650 milliGRAM(s) Oral every 6 hours PRN  heparin   Injectable 9000 Unit(s) IV Push every 6 hours PRN  heparin   Injectable 4000 Unit(s) IV Push every 6 hours PRN      REVIEW OF SYSTEMS  --------------------------------------------------------------------------------    Gen: denies  fevers/chills,  CVS: denies chest pain/palpitations  Resp:  + SOB/Cough  GI: Denies N/V/Abd pain  : Denies dysuria/    All other systems were reviewed and are negative, except as noted.    VITALS/PHYSICAL EXAM  --------------------------------------------------------------------------------  T(C): 36.2 (10-06-20 @ 19:23), Max: 37 (10-06-20 @ 13:30)  HR: 67 (10-06-20 @ 21:27) (64 - 89)  BP: 129/82 (10-06-20 @ 19:23) (97/47 - 136/81)  RR: 18 (10-06-20 @ 19:23) (17 - 18)  SpO2: 98% (10-06-20 @ 21:27) (95% - 99%)  Wt(kg): --  Height (cm): 162.6 (10-06-20 @ 08:06)  Weight (kg): 106.141 (10-06-20 @ 08:00)  BMI (kg/m2): 40.1 (10-06-20 @ 08:06)  BSA (m2): 2.09 (10-06-20 @ 08:06)      10-05-20 @ 07:01  -  10-06-20 @ 07:00  --------------------------------------------------------  IN: 605 mL / OUT: 1100 mL / NET: -495 mL    10-06-20 @ 07:01  -  10-06-20 @ 22:41  --------------------------------------------------------  IN: 330 mL / OUT: 300 mL / NET: 30 mL      Physical Exam:  	    Gen: less tachypneic   	+ JVD  	Pulm: decrease bs  no rales or ronchi or wheezing  	CV: RRR, S1S2; no rub  	Abd: +BS, soft, nontender/nondistended  	: No suprapubic tenderness  	UE: Warm, no cyanosis  no clubbing,  no edema  	LE: Warm, no cyanosis  no clubbing, non pitting mild  edema  	Neuro: alert and oriented. speech coherent       LABS/STUDIES  --------------------------------------------------------------------------------              9.1    10.44 >-----------<  342      [10-06-20 @ 04:50]              31.1     137  |  89  |  117  ----------------------------<  132      [10-06-20 @ 04:50]  4.3   |  33  |  2.35        Ca     10.7     [10-06-20 @ 04:50]      Mg     2.2     [10-06-20 @ 04:50]      Phos  4.3     [10-05-20 @ 05:58]    TPro  x   /  Alb  4.4  /  TBili  x   /  DBili  x   /  AST  x   /  ALT  x   /  AlkPhos  x   [10-06-20 @ 06:18]      PTT: 88.4       [10-05-20 @ 05:58]      Creatinine Trend:  SCr 2.35 [10-06 @ 04:50]  SCr 2.25 [10-05 @ 05:58]  SCr 2.25 [10-04 @ 05:20]  SCr 2.14 [10-03 @ 05:37]  SCr 2.23 [10-02 @ 05:11]              Urinalysis - [09-19-20 @ 04:15]      Color Light Yellow / Appearance Clear / SG 1.016 / pH 6.0      Gluc Negative / Ketone Negative  / Bili Negative / Urobili <2 mg/dL       Blood Negative / Protein Negative / Leuk Est Negative / Nitrite Negative      RBC  / WBC  / Hyaline  / Gran  / Sq Epi  / Non Sq Epi  / Bacteria     Urine Protein 6      [10-06-20 @ 05:58]    PTH -- (Ca 10.5)      [10-06-20 @ 06:17]   183  Vitamin D (25OH) 79.0      [10-06-20 @ 06:17]  HbA1c 5.9      [11-11-17 @ 14:00]  TSH 1.92      [09-19-20 @ 10:52]    Immunofixation Serum:   No Monoclonal Band Identified    Reference Range: None Detected      [10-06-20 @ 06:18]  SPEP Interpretation: Polyclonal Gammopathy      [10-06-20 @ 06:18]

## 2020-10-06 NOTE — PROGRESS NOTE ADULT - SUBJECTIVE AND OBJECTIVE BOX
SUBJECTIVE / OVERNIGHT EVENTS: pt denies chest pain, shortness of breath says she feels much better than before      MEDICATIONS  (STANDING):  albuterol/ipratropium for Nebulization 3 milliLiter(s) Nebulizer every 6 hours  allopurinol 100 milliGRAM(s) Oral daily  atorvastatin 40 milliGRAM(s) Oral at bedtime  heparin  Infusion. 1000 Unit(s)/Hr (10 mL/Hr) IV Continuous <Continuous>  lidocaine   Patch 1 Patch Transdermal every 24 hours  metoprolol tartrate 25 milliGRAM(s) Oral two times a day  pantoprazole    Tablet 40 milliGRAM(s) Oral before breakfast  polyethylene glycol 3350 17 Gram(s) Oral daily  senna 2 Tablet(s) Oral at bedtime    MEDICATIONS  (PRN):  acetaminophen   Tablet .. 650 milliGRAM(s) Oral every 6 hours PRN Temp greater or equal to 38.5C (101.3F), Mild Pain (1 - 3)  heparin   Injectable 9000 Unit(s) IV Push every 6 hours PRN For aPTT less than 40  heparin   Injectable 4000 Unit(s) IV Push every 6 hours PRN For aPTT between 40 - 57    Vital Signs Last 24 Hrs  T(C): 37 (06 Oct 2020 13:30), Max: 37 (06 Oct 2020 13:30)  T(F): 98.6 (06 Oct 2020 13:30), Max: 98.6 (06 Oct 2020 13:30)  HR: 83 (06 Oct 2020 13:30) (64 - 89)  BP: 124/70 (06 Oct 2020 13:30) (97/47 - 136/81)  BP(mean): --  RR: 18 (06 Oct 2020 13:30) (17 - 18)  SpO2: 99% (06 Oct 2020 13:30) (95% - 99%)    ENT: No congestion, ear pain, or sore throat.  Cardiovascular: No chest pain or palpation.  Respiratory: No cough, shortness of breath, congestion, or wheezing.  Gastrointestinal: No abdominal pain, nausea, vomiting, or diarrhea.  Genitourinary: No dysuria.  Musculoskeletal: No joint swelling.  Neurologic: No headache.    PHYSICAL EXAM:  GENERAL: NAD  EYES: EOMI, PERRLA  NECK: Supple, No JVD  CHEST/LUNG: crackles at bases  HEART:  S1 , S2 +  ABDOMEN: soft , bs+  EXTREMITIES:  edema+  NEUROLOGY:alert awake oriented     LABS:  10-06    137  |  89<L>  |  117<H>  ----------------------------<  132<H>  4.3   |  33<H>  |  2.35<H>    Ca    10.7<H>      06 Oct 2020 04:50  Phos  4.3     10-05  Mg     2.2     10-06    TPro  9.1<H>  /  Alb  4.6  /  TBili  0.6  /  DBili      /  AST  25  /  ALT  14  /  AlkPhos  112  10-05    Creatinine Trend: 2.35 <--, 2.25 <--, 2.25 <--, 2.14 <--, 2.23 <--, 2.07 <--, 1.72 <--                        9.1    10.44 )-----------( 342      ( 06 Oct 2020 04:50 )             31.1     Urine Studies:            LIVER FUNCTIONS - ( 05 Oct 2020 05:58 )  Alb: 4.6 g/dL / Pro: 9.1 g/dL / ALK PHOS: 112 U/L / ALT: 14 U/L / AST: 25 U/L / GGT: x           PTT - ( 05 Oct 2020 05:58 )  PTT:88.4 sec

## 2020-10-07 LAB
ANION GAP SERPL CALC-SCNC: 12 MMOL/L — SIGNIFICANT CHANGE UP (ref 5–17)
APTT BLD: 64.8 SEC — HIGH (ref 27.5–35.5)
APTT BLD: 75.3 SEC — HIGH (ref 27.5–35.5)
BUN SERPL-MCNC: 114 MG/DL — HIGH (ref 7–23)
CALCIUM SERPL-MCNC: 10.5 MG/DL — SIGNIFICANT CHANGE UP (ref 8.4–10.5)
CHLORIDE SERPL-SCNC: 91 MMOL/L — LOW (ref 96–108)
CO2 SERPL-SCNC: 33 MMOL/L — HIGH (ref 22–31)
CREAT SERPL-MCNC: 2.27 MG/DL — HIGH (ref 0.5–1.3)
GLUCOSE SERPL-MCNC: 152 MG/DL — HIGH (ref 70–99)
HCT VFR BLD CALC: 29.6 % — LOW (ref 34.5–45)
HGB BLD-MCNC: 8.4 G/DL — LOW (ref 11.5–15.5)
INR BLD: 1.12 RATIO — SIGNIFICANT CHANGE UP (ref 0.88–1.16)
M PROTEIN 24H UR ELPH-MRATE: SIGNIFICANT CHANGE UP
MCHC RBC-ENTMCNC: 25.8 PG — LOW (ref 27–34)
MCHC RBC-ENTMCNC: 28.4 GM/DL — LOW (ref 32–36)
MCV RBC AUTO: 90.8 FL — SIGNIFICANT CHANGE UP (ref 80–100)
NRBC # BLD: 0 /100 WBCS — SIGNIFICANT CHANGE UP (ref 0–0)
PLATELET # BLD AUTO: 326 K/UL — SIGNIFICANT CHANGE UP (ref 150–400)
POTASSIUM SERPL-MCNC: 4.4 MMOL/L — SIGNIFICANT CHANGE UP (ref 3.5–5.3)
POTASSIUM SERPL-SCNC: 4.4 MMOL/L — SIGNIFICANT CHANGE UP (ref 3.5–5.3)
PROTHROM AB SERPL-ACNC: 13.4 SEC — SIGNIFICANT CHANGE UP (ref 10.6–13.6)
RBC # BLD: 3.26 M/UL — LOW (ref 3.8–5.2)
RBC # FLD: 17.7 % — HIGH (ref 10.3–14.5)
SODIUM SERPL-SCNC: 136 MMOL/L — SIGNIFICANT CHANGE UP (ref 135–145)
WBC # BLD: 8.72 K/UL — SIGNIFICANT CHANGE UP (ref 3.8–10.5)
WBC # FLD AUTO: 8.72 K/UL — SIGNIFICANT CHANGE UP (ref 3.8–10.5)

## 2020-10-07 PROCEDURE — 99233 SBSQ HOSP IP/OBS HIGH 50: CPT

## 2020-10-07 PROCEDURE — 99232 SBSQ HOSP IP/OBS MODERATE 35: CPT

## 2020-10-07 RX ORDER — WARFARIN SODIUM 2.5 MG/1
5 TABLET ORAL ONCE
Refills: 0 | Status: COMPLETED | OUTPATIENT
Start: 2020-10-07 | End: 2020-10-07

## 2020-10-07 RX ADMIN — Medication 3 MILLILITER(S): at 13:13

## 2020-10-07 RX ADMIN — Medication 3 MILLILITER(S): at 17:17

## 2020-10-07 RX ADMIN — Medication 3 MILLILITER(S): at 05:26

## 2020-10-07 RX ADMIN — POLYETHYLENE GLYCOL 3350 17 GRAM(S): 17 POWDER, FOR SOLUTION ORAL at 09:50

## 2020-10-07 RX ADMIN — Medication 25 MILLIGRAM(S): at 05:26

## 2020-10-07 RX ADMIN — HEPARIN SODIUM 1000 UNIT(S)/HR: 5000 INJECTION INTRAVENOUS; SUBCUTANEOUS at 02:26

## 2020-10-07 RX ADMIN — PANTOPRAZOLE SODIUM 40 MILLIGRAM(S): 20 TABLET, DELAYED RELEASE ORAL at 05:26

## 2020-10-07 RX ADMIN — ATORVASTATIN CALCIUM 40 MILLIGRAM(S): 80 TABLET, FILM COATED ORAL at 21:14

## 2020-10-07 RX ADMIN — Medication 100 MILLIGRAM(S): at 09:51

## 2020-10-07 RX ADMIN — WARFARIN SODIUM 5 MILLIGRAM(S): 2.5 TABLET ORAL at 21:14

## 2020-10-07 RX ADMIN — Medication 25 MILLIGRAM(S): at 17:17

## 2020-10-07 RX ADMIN — Medication 3 MILLILITER(S): at 23:51

## 2020-10-07 NOTE — PROGRESS NOTE ADULT - SUBJECTIVE AND OBJECTIVE BOX
Subjective:  Underwent R/LHC yesterday. Denies dyspnea/orthopnea but notes fatigue.     Medications:  acetaminophen   Tablet .. 650 milliGRAM(s) Oral every 6 hours PRN  albuterol/ipratropium for Nebulization 3 milliLiter(s) Nebulizer every 6 hours  allopurinol 100 milliGRAM(s) Oral daily  atorvastatin 40 milliGRAM(s) Oral at bedtime  heparin   Injectable 9000 Unit(s) IV Push every 6 hours PRN  heparin   Injectable 4000 Unit(s) IV Push every 6 hours PRN  heparin  Infusion. 1000 Unit(s)/Hr IV Continuous <Continuous>  lidocaine   Patch 1 Patch Transdermal every 24 hours  metoprolol tartrate 25 milliGRAM(s) Oral two times a day  pantoprazole    Tablet 40 milliGRAM(s) Oral before breakfast  polyethylene glycol 3350 17 Gram(s) Oral daily  senna 2 Tablet(s) Oral at bedtime    Vitals:  T(C): 36.8 (10-07-20 @ 12:00), Max: 37.2 (10-07-20 @ 08:40)  HR: 70 (10-07-20 @ 12:00) (67 - 82)  BP: 101/65 (10-07-20 @ 12:00) (101/65 - 133/67)  BP(mean): 77 (10-07-20 @ 12:00) (77 - 77)  RR: 18 (10-07-20 @ 12:00) (18 - 18)  SpO2: 100% (10-07-20 @ 12:00) (94% - 100%)    Daily     Daily Weight in k.4 (07 Oct 2020 07:34)    Weight (kg): 106.141 (10-06 @ 08:00)    I&O's Summary    06 Oct 2020 07:  -  07 Oct 2020 07:00  --------------------------------------------------------  IN: 560 mL / OUT: 700 mL / NET: -140 mL    07 Oct 2020 07:01  -  07 Oct 2020 13:33  --------------------------------------------------------  IN: 620 mL / OUT: 1100 mL / NET: -480 mL        Physical Exam:  Appearance: No Acute Distress  HEENT: JVP non elevated  Cardiovascular: RRR, mechanical S1, 1/6 EMILY throughout   Respiratory: Clear to auscultation bilaterally  Gastrointestinal: Soft, Non-tender, non-distended	  Skin: no skin lesions  Neurologic: Non-focal  Extremities: No LE edema, warm and well perfused  Psychiatry: A & O x 3, Mood & affect appropriate      Labs:                        8.4    8.72  )-----------( 326      ( 07 Oct 2020 01:41 )             29.6     10-07    136  |  91<L>  |  114<H>  ----------------------------<  152<H>  4.4   |  33<H>  |  2.27<H>    Ca    10.5      07 Oct 2020 01:41  Mg     2.2     10-06    TPro  x   /  Alb  4.4  /  TBili  x   /  DBili  x   /  AST  x   /  ALT  x   /  AlkPhos  x   10-06      PTT - ( 07 Oct 2020 09:24 )  PTT:75.3 sec

## 2020-10-07 NOTE — PROGRESS NOTE ADULT - ASSESSMENT
70 year old woman with chf/ pulmonary hypertension, morbid obesity (BMI 41), HTN severe MR s/p mechanical MVR 1999 ([facility] with Dr. Anderson) and severe TR s/p TVR 2012 ([facility]), , AFib on Coumadin (s/p ablation), CKD 3 (b/l SCr ~1.3-1.6), MIGUEL and gout presented to [facility] with SOB and chest tightness transferred. required diuretics for chf. dx with mitral valve disease. required diuretics  now cr noticed to be elevated to 2.2 range.     1- MISAEL on ckd III   2- hypercalcemia   3- chf   4- anemia     misael in setting of diuretics use likely. diuretics cont to hold   primary hyperparathyroid in setting of hypercalcemia and high intact pth suspected   anemia trend hb   renal sono pre and post void bladder sono

## 2020-10-07 NOTE — PROGRESS NOTE ADULT - SUBJECTIVE AND OBJECTIVE BOX
SUBJECTIVE / OVERNIGHT EVENTS: pt denies chest pain, shortness of breath says she feels much better than before    MEDICATIONS  (STANDING):  albuterol/ipratropium for Nebulization 3 milliLiter(s) Nebulizer every 6 hours  allopurinol 100 milliGRAM(s) Oral daily  atorvastatin 40 milliGRAM(s) Oral at bedtime  heparin  Infusion. 1000 Unit(s)/Hr (10 mL/Hr) IV Continuous <Continuous>  lidocaine   Patch 1 Patch Transdermal every 24 hours  metoprolol tartrate 25 milliGRAM(s) Oral two times a day  pantoprazole    Tablet 40 milliGRAM(s) Oral before breakfast  polyethylene glycol 3350 17 Gram(s) Oral daily  senna 2 Tablet(s) Oral at bedtime    MEDICATIONS  (PRN):  acetaminophen   Tablet .. 650 milliGRAM(s) Oral every 6 hours PRN Temp greater or equal to 38.5C (101.3F), Mild Pain (1 - 3)  heparin   Injectable 9000 Unit(s) IV Push every 6 hours PRN For aPTT less than 40  heparin   Injectable 4000 Unit(s) IV Push every 6 hours PRN For aPTT between 40 - 57    Vital Signs Last 24 Hrs  T(C): 36.9 (07 Oct 2020 20:08), Max: 37.2 (07 Oct 2020 08:40)  T(F): 98.5 (07 Oct 2020 20:08), Max: 98.9 (07 Oct 2020 08:40)  HR: 78 (07 Oct 2020 21:06) (69 - 82)  BP: 115/62 (07 Oct 2020 20:08) (101/65 - 133/67)  BP(mean): 77 (07 Oct 2020 12:00) (77 - 77)  RR: 18 (07 Oct 2020 20:08) (18 - 18)  SpO2: 96% (07 Oct 2020 21:06) (94% - 100%)    ENT: No congestion, ear pain, or sore throat.  Cardiovascular: No chest pain or palpation.  Respiratory: No cough, shortness of breath, congestion, or wheezing.  Gastrointestinal: No abdominal pain, nausea, vomiting, or diarrhea.  Genitourinary: No dysuria.  Musculoskeletal: No joint swelling.  Neurologic: No headache.    PHYSICAL EXAM:  GENERAL: NAD  EYES: EOMI, PERRLA  NECK: Supple, No JVD  CHEST/LUNG: crackles at bases  HEART:  S1 , S2 +  ABDOMEN: soft , bs+  EXTREMITIES:  edema+  NEUROLOGY:alert awake oriented     LABS:  10-07    136  |  91<L>  |  114<H>  ----------------------------<  152<H>  4.4   |  33<H>  |  2.27<H>    Ca    10.5      07 Oct 2020 01:41  Mg     2.2     10-06    TPro      /  Alb  4.4  /  TBili      /  DBili      /  AST      /  ALT      /  AlkPhos      10-06    Creatinine Trend: 2.27 <--, 2.35 <--, 2.25 <--, 2.25 <--, 2.14 <--, 2.23 <--, 2.07 <--                        8.4    8.72  )-----------( 326      ( 07 Oct 2020 01:41 )             29.6     Urine Studies:            LIVER FUNCTIONS - ( 06 Oct 2020 06:18 )  Alb: 4.4 g/dL / Pro: x     / ALK PHOS: x     / ALT: x     / AST: x     / GGT: x           PT/INR - ( 07 Oct 2020 17:13 )   PT: 13.4 sec;   INR: 1.12 ratio         PTT - ( 07 Oct 2020 09:24 )  PTT:75.3 sec

## 2020-10-07 NOTE — PROGRESS NOTE ADULT - SUBJECTIVE AND OBJECTIVE BOX
[facility]   [phone]  RENAL FOLLOW UP NOTE  --------------------------------------------------------------------------------  Chief Complaint:    24 hour events/subjective:    seen earlier. states breathing is better overall and has no new c/o     PAST HISTORY  --------------------------------------------------------------------------------  No significant changes to PMH, PSH, FHx, SHx, unless otherwise noted    ALLERGIES & MEDICATIONS  --------------------------------------------------------------------------------  Allergies    No Known Allergies    Intolerances      Standing Inpatient Medications  albuterol/ipratropium for Nebulization 3 milliLiter(s) Nebulizer every 6 hours  allopurinol 100 milliGRAM(s) Oral daily  atorvastatin 40 milliGRAM(s) Oral at bedtime  heparin  Infusion. 1000 Unit(s)/Hr IV Continuous <Continuous>  lidocaine   Patch 1 Patch Transdermal every 24 hours  metoprolol tartrate 25 milliGRAM(s) Oral two times a day  pantoprazole    Tablet 40 milliGRAM(s) Oral before breakfast  polyethylene glycol 3350 17 Gram(s) Oral daily  senna 2 Tablet(s) Oral at bedtime    PRN Inpatient Medications  acetaminophen   Tablet .. 650 milliGRAM(s) Oral every 6 hours PRN  heparin   Injectable 9000 Unit(s) IV Push every 6 hours PRN  heparin   Injectable 4000 Unit(s) IV Push every 6 hours PRN      REVIEW OF SYSTEMS  --------------------------------------------------------------------------------    Gen: denies  fevers/chills,  CVS: denies chest pain/palpitations  Resp: denies SOB/Cough  GI: Denies N/V/Abd pain  : Denies dysuria no decrease urination     All other systems were reviewed and are negative, except as noted.    VITALS/PHYSICAL EXAM  --------------------------------------------------------------------------------  T(C): 36.9 (10-07-20 @ 20:08), Max: 37.2 (10-07-20 @ 08:40)  HR: 78 (10-07-20 @ 21:06) (67 - 82)  BP: 115/62 (10-07-20 @ 20:08) (101/65 - 133/67)  RR: 18 (10-07-20 @ 20:08) (18 - 18)  SpO2: 96% (10-07-20 @ 21:06) (94% - 100%)  Wt(kg): --  Height (cm): 162.6 (10-06-20 @ 08:06)  Weight (kg): 106.141 (10-06-20 @ 08:00)  BMI (kg/m2): 40.1 (10-06-20 @ 08:06)  BSA (m2): 2.09 (10-06-20 @ 08:06)      10-06-20 @ 07:01  -  10-07-20 @ 07:00  --------------------------------------------------------  IN: 560 mL / OUT: 700 mL / NET: -140 mL    10-07-20 @ 07:01  -  10-07-20 @ 21:18  --------------------------------------------------------  IN: 630 mL / OUT: 1100 mL / NET: -470 mL      Physical Exam:  	    Gen: comfortable appearing   	+ JVD  	Pulm: decrease bs  no rales or ronchi or wheezing  	CV: RRR, S1S2; no rub  	Abd: +BS, soft, nontender/nondistended  	: No suprapubic tenderness  	UE: Warm, no cyanosis  no clubbing,  no edema  	LE: Warm, no cyanosis  no clubbing, non pitting no  edema  	Neuro: alert and oriented. speech coherent       LABS/STUDIES  --------------------------------------------------------------------------------              8.4    8.72  >-----------<  326      [10-07-20 @ 01:41]              29.6     136  |  91  |  114  ----------------------------<  152      [10-07-20 @ 01:41]  4.4   |  33  |  2.27        Ca     10.5     [10-07-20 @ 01:41]      Mg     2.2     [10-06-20 @ 04:50]    TPro  x   /  Alb  4.4  /  TBili  x   /  DBili  x   /  AST  x   /  ALT  x   /  AlkPhos  x   [10-06-20 @ 06:18]    PT/INR: PT 13.4 , INR 1.12       [10-07-20 @ 17:13]  PTT: 75.3       [10-07-20 @ 09:24]      Creatinine Trend:  SCr 2.27 [10-07 @ 01:41]  SCr 2.35 [10-06 @ 04:50]  SCr 2.25 [10-05 @ 05:58]  SCr 2.25 [10-04 @ 05:20]  SCr 2.14 [10-03 @ 05:37]              Urinalysis - [09-19-20 @ 04:15]      Color Light Yellow / Appearance Clear / SG 1.016 / pH 6.0      Gluc Negative / Ketone Negative  / Bili Negative / Urobili <2 mg/dL       Blood Negative / Protein Negative / Leuk Est Negative / Nitrite Negative      RBC  / WBC  / Hyaline  / Gran  / Sq Epi  / Non Sq Epi  / Bacteria     Urine Protein 6      [10-06-20 @ 05:58]    PTH -- (Ca 10.5)      [10-06-20 @ 06:17]   183  Vitamin D (25OH) 79.0      [10-06-20 @ 06:17]  HbA1c 5.9      [11-11-17 @ 14:00]  TSH 1.92      [09-19-20 @ 10:52]    Immunofixation Serum:   No Monoclonal Band Identified    Reference Range: None Detected      [10-06-20 @ 06:18]  SPEP Interpretation: Polyclonal Gammopathy      [10-06-20 @ 06:18]

## 2020-10-07 NOTE — PROGRESS NOTE ADULT - PROBLEM SELECTOR PLAN 4
- Continue metoprolol to achieve HR 60-70 for mitral stenosis.  - Continue heparin and restart coumadin for goal INR 2.5-3.5  - No significant mitral stenosis on TTE/cath

## 2020-10-07 NOTE — PROGRESS NOTE ADULT - PROBLEM SELECTOR PLAN 3
- Currently in NSR  - Continue heparin drip  - Restart coumadin, goal INR 2.5-3.5 given mechanical MVR

## 2020-10-07 NOTE — PROGRESS NOTE ADULT - ASSESSMENT
Ms. Alcantara is a 70 year old woman with HFpEF in the context of valvular heart disease, likely rheumatic, complicated by pulmonary hypertension, morbid obesity (BMI 41), HTN (diagnosed in 30's and reportedly well controlled with medications), severe MR s/p mechanical MVR 1999 ([facility] with Dr. Anderson) and severe TR s/p TVR 2012 ([facility]), ?COPD (home 02; no prior tobacco use), AFib on Coumadin (s/p ablation), CKD 3 (b/l SCr ~1.3-1.6), MIGUEL and gout. followed by Dr. Duarte (cardiologist) who presented to [facility] with SOB and chest tightness transferred on 8/18 for possible mitral intervention for possible mitral stenosis with Dr. Fernandez. TTE performed with normal LV function with limited views to assess mitral valve however gradients were not significantly. Suspect she has a component of acute on chronic HFpEF as well as OHS/MIGUEL.    She was diuresed ~20 lbs and developed pre-renal MISAEL with low estimated RA pressure on TTE for which R/LHC performed as below. There is significant respiratory variation of her pressures but I suspect her intravascular volume status is low. She is not interested in implantation of Cardiomems due to concern that she would not be able to manage the device at home. Will continue to hold diuretics and monitor renal function which is slowly improving.     Recent studies  TTE 10/4:   images very limited, LV non-dilated, visual LVEF ~50%, dilated RV with at least moderate dysfunction, small/collapsing IVC, LVOT VTI 13 cm, mean gradient 8 mmHg across MV    R/LHC/MV fluoroscopy 10/6 (waveforms visualized):   RA ranges from 5->20 between inspiration/expiration, no dip/plateau waveforms seen   RV 51/12 without square root sign  PA 50/17 (29)  PCWP ranges from 5->25 between inspiration/expiration  LVEDP 16  CO/CI 7.6/3.6  MV opening well with calculated MVA 2.4 cm2

## 2020-10-07 NOTE — PROGRESS NOTE ADULT - SUBJECTIVE AND OBJECTIVE BOX
SUBJECTIVE / OVERNIGHT EVENTS: pt denies chest pain, shortness of breath says she feels much better than before  MEDICATIONS  (STANDING):  albuterol/ipratropium for Nebulization 3 milliLiter(s) Nebulizer every 6 hours  allopurinol 100 milliGRAM(s) Oral daily  atorvastatin 40 milliGRAM(s) Oral at bedtime  heparin  Infusion. 1000 Unit(s)/Hr (10 mL/Hr) IV Continuous <Continuous>  lidocaine   Patch 1 Patch Transdermal every 24 hours  metoprolol tartrate 25 milliGRAM(s) Oral two times a day  pantoprazole    Tablet 40 milliGRAM(s) Oral before breakfast  polyethylene glycol 3350 17 Gram(s) Oral daily  senna 2 Tablet(s) Oral at bedtime    MEDICATIONS  (PRN):  acetaminophen   Tablet .. 650 milliGRAM(s) Oral every 6 hours PRN Temp greater or equal to 38.5C (101.3F), Mild Pain (1 - 3)  heparin   Injectable 9000 Unit(s) IV Push every 6 hours PRN For aPTT less than 40  heparin   Injectable 4000 Unit(s) IV Push every 6 hours PRN For aPTT between 40 - 57    Vital Signs Last 24 Hrs  T(C): 37.2 (07 Oct 2020 08:40), Max: 37.2 (07 Oct 2020 08:40)  T(F): 98.9 (07 Oct 2020 08:40), Max: 98.9 (07 Oct 2020 08:40)  HR: 73 (07 Oct 2020 09:57) (67 - 83)  BP: 133/67 (07 Oct 2020 08:40) (101/65 - 133/67)  BP(mean): --  RR: 18 (07 Oct 2020 08:40) (17 - 18)  SpO2: 95% (07 Oct 2020 09:57) (94% - 99%)    ENT: No congestion, ear pain, or sore throat.  Cardiovascular: No chest pain or palpation.  Respiratory: No cough, shortness of breath, congestion, or wheezing.  Gastrointestinal: No abdominal pain, nausea, vomiting, or diarrhea.  Genitourinary: No dysuria.  Musculoskeletal: No joint swelling.  Neurologic: No headache.    PHYSICAL EXAM:  GENERAL: NAD  EYES: EOMI, PERRLA  NECK: Supple, No JVD  CHEST/LUNG: crackles at bases  HEART:  S1 , S2 +  ABDOMEN: soft , bs+  EXTREMITIES:  edema+  NEUROLOGY:alert awake oriented     LABS:  10-07    136  |  91<L>  |  114<H>  ----------------------------<  152<H>  4.4   |  33<H>  |  2.27<H>    Ca    10.5      07 Oct 2020 01:41  Mg     2.2     10-06    TPro      /  Alb  4.4  /  TBili      /  DBili      /  AST      /  ALT      /  AlkPhos      10-06    Creatinine Trend: 2.27 <--, 2.35 <--, 2.25 <--, 2.25 <--, 2.14 <--, 2.23 <--, 2.07 <--                        8.4    8.72  )-----------( 326      ( 07 Oct 2020 01:41 )             29.6     Urine Studies:            LIVER FUNCTIONS - ( 06 Oct 2020 06:18 )  Alb: 4.4 g/dL / Pro: x     / ALK PHOS: x     / ALT: x     / AST: x     / GGT: x           PTT - ( 07 Oct 2020 09:24 )  PTT:75.3 sec  PTT - ( 05 Oct 2020 05:58 )  PTT:88.4 sec

## 2020-10-07 NOTE — CHART NOTE - NSCHARTNOTEFT_GEN_A_CORE
Pt seen for LOS admission on [facility]. Pt reports fair po intake persists. Complains that carrots, salad and rice are too hard for her to chew, requests softer diet.    Pt is a 70 year old female with PMH HTN, CHF, COPD valvular heart disease MVR for mitral stenosis, TVR '12 now has severe TR and HTN transferred for heart failure optimization. Of note, pt's Hba1c: 5.8%, within pre-DM range. Nephrology following for MISAEL on CKD.    Source: Patient [x]    Family [ ]     other [x] EMR    Diet: Consistent CHo Renal/No Snacks, Supplement: Ensure Enlive x 2 daily (700 kcal, 40 grams protein)    Patient reports [ ] nausea  [ ] vomiting [ ] diarrhea [ ] constipation  [x]chewing problems [ ] swallowing issues  [x] other: requests soft foods, fair po intake/appetite    PO intake (meals):  < 50% [ ] 50-75% [x]   % [ ]  other :  PO intake (supplements): 1-2 containers Ensure Enlive daily    Source for PO intake [x] Patient [ ] family [x] chart [ ] staff [ ] other    Enteral/Parenteral Nutrition: n/a    Current Weight: 236.7 pounds (current, standing, +1 B/L leg edema noted). 239.2 pounds admit wt 9/18.    Pertinent Medications: MEDICATIONS  (STANDING):  albuterol/ipratropium for Nebulization 3 milliLiter(s) Nebulizer every 6 hours  allopurinol 100 milliGRAM(s) Oral daily  atorvastatin 40 milliGRAM(s) Oral at bedtime  heparin  Infusion. 1000 Unit(s)/Hr (10 mL/Hr) IV Continuous <Continuous>  lidocaine   Patch 1 Patch Transdermal every 24 hours  metoprolol tartrate 25 milliGRAM(s) Oral two times a day  pantoprazole    Tablet 40 milliGRAM(s) Oral before breakfast  polyethylene glycol 3350 17 Gram(s) Oral daily  senna 2 Tablet(s) Oral at bedtime    MEDICATIONS  (PRN):  acetaminophen   Tablet .. 650 milliGRAM(s) Oral every 6 hours PRN Temp greater or equal to 38.5C (101.3F), Mild Pain (1 - 3)  heparin   Injectable 9000 Unit(s) IV Push every 6 hours PRN For aPTT less than 40  heparin   Injectable 4000 Unit(s) IV Push every 6 hours PRN For aPTT between 40 - 57    Pertinent Labs:  10-07 Na136 mmol/L Glu 152 mg/dL<H> K+ 4.4 mmol/L Cr  2.27 mg/dL<H>  mg/dL<H> 10-05 Phos 4.3 mg/dL 10-06 Alb 4.4 g/dL      Skin: No pressure injuries noted      Estimated Needs:    [x] no change since previous assessment  [ ] recalculated:       Previous Nutrition Diagnosis: Overweight/Obesity         Nutrition Diagnosis is [x] ongoing  [ ] resolved [ ] not applicable          New Nutrition Diagnosis: [x] not applicable         Interventions:     1) Recommend add soft to diet order for ease of chewing. May otherwise current diet order to promote glycemic control and in context of CKD.  -Will allow mashed potato at lunch daily per pt request  -May continue Ensure Enlive 2x daily per pt request; to monitor renal labs for potential switch to Nepro  2) Reviewed wt loss recommendations with pt on prior visits, review/reinforce as appropriate, pt currently with reduced po intake       Monitoring and Evaluation:     [x] PO intake [x] Tolerance to diet prescription [x] weights [x] follow up per protocol    [x] other: RD remains available: Linda Roque MS, RDN, CDN, CDE, CSOW[facility]. #[phone].

## 2020-10-07 NOTE — PROGRESS NOTE ADULT - SUBJECTIVE AND OBJECTIVE BOX
PULMONARY PROGRESS NOTE    DAMARI GUERRERO  MRN-[id number]    Patient is a 70y old  Female who presents with a chief complaint of mitral regurgitation, heart failure (30 Sep 2020 12:26)      HPI:  sitting in chair  reports using NIVV overnight  sob with exertion    MEDICATIONS  (STANDING):  albuterol/ipratropium for Nebulization 3 milliLiter(s) Nebulizer every 6 hours  allopurinol 100 milliGRAM(s) Oral daily  atorvastatin 40 milliGRAM(s) Oral at bedtime  heparin  Infusion. 1000 Unit(s)/Hr (10 mL/Hr) IV Continuous <Continuous>  lidocaine   Patch 1 Patch Transdermal every 24 hours  metoprolol tartrate 25 milliGRAM(s) Oral two times a day  pantoprazole    Tablet 40 milliGRAM(s) Oral before breakfast  polyethylene glycol 3350 17 Gram(s) Oral daily  senna 2 Tablet(s) Oral at bedtime    MEDICATIONS  (PRN):  acetaminophen   Tablet .. 650 milliGRAM(s) Oral every 6 hours PRN Temp greater or equal to 38.5C (101.3F), Mild Pain (1 - 3)  heparin   Injectable 9000 Unit(s) IV Push every 6 hours PRN For aPTT less than 40  heparin   Injectable 4000 Unit(s) IV Push every 6 hours PRN For aPTT between 40 - 57          EXAM:  Vital Signs Last 24 Hrs  T(C): 36.8 (07 Oct 2020 05:19), Max: 37 (06 Oct 2020 13:30)  T(F): 98.2 (07 Oct 2020 05:19), Max: 98.6 (06 Oct 2020 13:30)  HR: 73 (07 Oct 2020 09:57) (64 - 83)  BP: 128/80 (07 Oct 2020 05:19) (101/65 - 136/81)  BP(mean): --  RR: 18 (07 Oct 2020 05:19) (17 - 18)  SpO2: 95% (07 Oct 2020 09:57) (94% - 99%)    GENERAL: The patient is awake and alert in no apparent distress.     LUNGS: respirations unlabored                                           8.4    8.72  )-----------( 326      ( 07 Oct 2020 01:41 )             29.6   10-07    136  |  91<L>  |  114<H>  ----------------------------<  152<H>  4.4   |  33<H>  |  2.27<H>    Ca    10.5      07 Oct 2020 01:41  Mg     2.2     10-06    TPro  x   /  Alb  4.4  /  TBili  x   /  DBili  x   /  AST  x   /  ALT  x   /  AlkPhos  x   10-06         rad< from: Xray Chest 1 View- PORTABLE-Urgent (Xray Chest 1 View- PORTABLE-Urgent .) (09.18.20 @ 21:07) >    EXAM:  XR CHEST PORTABLE URGENT 1V                            PROCEDURE DATE:  09/18/2020            INTERPRETATION:  CLINICAL HISTORY: CHF.    TECHNIQUE: Single upright AP chest radiograph.    COMPARISON: Comparison with previous from 6/20/2020.    COMMENT:  Sternotomy wires are noted.    There is mild pulmonary vascular congestion.  There is no focal airspace consolidation.  There are no pleural effusions.    The mediastinal contour is markedly enlarged.  The trachea is midline.    The osseous structures are intact.    IMPRESSION:  Marked cardiomegaly with mild pulmonary vascular congestion likely representing mild fluid overload          < end of copied text >      PROBLEM LIST:  70y Female with HEALTH ISSUES - PROBLEM Dx:  Shortness of breath  Shortness of breath    Chronic kidney disease (CKD), stage III (moderate)  Chronic kidney disease (CKD), stage III (moderate)    Tricuspid valve replaced  Tricuspid valve replaced    Mitral valve replaced  Mitral valve replaced    Atrial fibrillation, unspecified type  Atrial fibrillation, unspecified type    Essential hypertension  Essential hypertension    Acute on chronic heart failure with preserved ejection fraction  Acute on chronic heart failure with preserved ejection fraction    Mitral valve stenosis, unspecified etiology  Mitral valve stenosis, unspecified etiology    CHF (congestive heart failure)  CHF (congestive heart failure)    Gout  Gout    MIGUEL (obstructive sleep apnea)  MIGUEL (obstructive sleep apnea)    Atrial fibrillation  Atrial fibrillation           RECS:  diuresis per HF team  continue on NIV during sleep  wean daytime O2 as tolerated  incentive jovanny      Please call with any questions.    Em Merida MD  [facility] Pulmonary/Sleep Medicine  [phone]

## 2020-10-08 LAB
ANION GAP SERPL CALC-SCNC: 12 MMOL/L — SIGNIFICANT CHANGE UP (ref 5–17)
APTT BLD: 72.2 SEC — HIGH (ref 27.5–35.5)
BUN SERPL-MCNC: 111 MG/DL — HIGH (ref 7–23)
CALCIUM SERPL-MCNC: 10.2 MG/DL — SIGNIFICANT CHANGE UP (ref 8.4–10.5)
CHLORIDE SERPL-SCNC: 91 MMOL/L — LOW (ref 96–108)
CO2 SERPL-SCNC: 32 MMOL/L — HIGH (ref 22–31)
CREAT SERPL-MCNC: 2.02 MG/DL — HIGH (ref 0.5–1.3)
GLUCOSE SERPL-MCNC: 175 MG/DL — HIGH (ref 70–99)
HCT VFR BLD CALC: 30.9 % — LOW (ref 34.5–45)
HGB BLD-MCNC: 8.9 G/DL — LOW (ref 11.5–15.5)
INR BLD: 1.12 RATIO — SIGNIFICANT CHANGE UP (ref 0.88–1.16)
MCHC RBC-ENTMCNC: 26.2 PG — LOW (ref 27–34)
MCHC RBC-ENTMCNC: 28.8 GM/DL — LOW (ref 32–36)
MCV RBC AUTO: 90.9 FL — SIGNIFICANT CHANGE UP (ref 80–100)
NRBC # BLD: 0 /100 WBCS — SIGNIFICANT CHANGE UP (ref 0–0)
PLATELET # BLD AUTO: 300 K/UL — SIGNIFICANT CHANGE UP (ref 150–400)
POTASSIUM SERPL-MCNC: 4.5 MMOL/L — SIGNIFICANT CHANGE UP (ref 3.5–5.3)
POTASSIUM SERPL-SCNC: 4.5 MMOL/L — SIGNIFICANT CHANGE UP (ref 3.5–5.3)
PROTHROM AB SERPL-ACNC: 13.1 SEC — SIGNIFICANT CHANGE UP (ref 10.6–13.6)
RBC # BLD: 3.4 M/UL — LOW (ref 3.8–5.2)
RBC # FLD: 17.6 % — HIGH (ref 10.3–14.5)
SODIUM SERPL-SCNC: 135 MMOL/L — SIGNIFICANT CHANGE UP (ref 135–145)
WBC # BLD: 8.12 K/UL — SIGNIFICANT CHANGE UP (ref 3.8–10.5)
WBC # FLD AUTO: 8.12 K/UL — SIGNIFICANT CHANGE UP (ref 3.8–10.5)

## 2020-10-08 RX ORDER — WARFARIN SODIUM 2.5 MG/1
5 TABLET ORAL ONCE
Refills: 0 | Status: COMPLETED | OUTPATIENT
Start: 2020-10-08 | End: 2020-10-08

## 2020-10-08 RX ORDER — HEPARIN SODIUM 5000 [USP'U]/ML
1000 INJECTION INTRAVENOUS; SUBCUTANEOUS
Qty: 25000 | Refills: 0 | Status: DISCONTINUED | OUTPATIENT
Start: 2020-10-08 | End: 2020-10-19

## 2020-10-08 RX ORDER — HEPARIN SODIUM 5000 [USP'U]/ML
9000 INJECTION INTRAVENOUS; SUBCUTANEOUS EVERY 6 HOURS
Refills: 0 | Status: DISCONTINUED | OUTPATIENT
Start: 2020-10-08 | End: 2020-10-19

## 2020-10-08 RX ORDER — HEPARIN SODIUM 5000 [USP'U]/ML
4000 INJECTION INTRAVENOUS; SUBCUTANEOUS EVERY 6 HOURS
Refills: 0 | Status: DISCONTINUED | OUTPATIENT
Start: 2020-10-08 | End: 2020-10-19

## 2020-10-08 RX ORDER — ONDANSETRON 8 MG/1
4 TABLET, FILM COATED ORAL ONCE
Refills: 0 | Status: COMPLETED | OUTPATIENT
Start: 2020-10-08 | End: 2020-10-08

## 2020-10-08 RX ADMIN — Medication 100 MILLIGRAM(S): at 11:30

## 2020-10-08 RX ADMIN — Medication 25 MILLIGRAM(S): at 05:26

## 2020-10-08 RX ADMIN — WARFARIN SODIUM 5 MILLIGRAM(S): 2.5 TABLET ORAL at 21:30

## 2020-10-08 RX ADMIN — Medication 3 MILLILITER(S): at 05:19

## 2020-10-08 RX ADMIN — PANTOPRAZOLE SODIUM 40 MILLIGRAM(S): 20 TABLET, DELAYED RELEASE ORAL at 05:18

## 2020-10-08 RX ADMIN — Medication 25 MILLIGRAM(S): at 17:26

## 2020-10-08 RX ADMIN — HEPARIN SODIUM 1000 UNIT(S)/HR: 5000 INJECTION INTRAVENOUS; SUBCUTANEOUS at 13:02

## 2020-10-08 RX ADMIN — Medication 3 MILLILITER(S): at 17:26

## 2020-10-08 RX ADMIN — Medication 3 MILLILITER(S): at 11:30

## 2020-10-08 RX ADMIN — SENNA PLUS 2 TABLET(S): 8.6 TABLET ORAL at 21:30

## 2020-10-08 RX ADMIN — LIDOCAINE 1 PATCH: 4 CREAM TOPICAL at 04:45

## 2020-10-08 RX ADMIN — ONDANSETRON 4 MILLIGRAM(S): 8 TABLET, FILM COATED ORAL at 16:09

## 2020-10-08 RX ADMIN — ATORVASTATIN CALCIUM 40 MILLIGRAM(S): 80 TABLET, FILM COATED ORAL at 21:30

## 2020-10-08 NOTE — PROGRESS NOTE ADULT - SUBJECTIVE AND OBJECTIVE BOX
SUBJECTIVE / OVERNIGHT EVENTS: pt denies chest pain, shortness of breath says she feels much better than before    MEDICATIONS  (STANDING):  albuterol/ipratropium for Nebulization 3 milliLiter(s) Nebulizer every 6 hours  allopurinol 100 milliGRAM(s) Oral daily  atorvastatin 40 milliGRAM(s) Oral at bedtime  heparin  Infusion. 1000 Unit(s)/Hr (10 mL/Hr) IV Continuous <Continuous>  lidocaine   Patch 1 Patch Transdermal every 24 hours  metoprolol tartrate 25 milliGRAM(s) Oral two times a day  pantoprazole    Tablet 40 milliGRAM(s) Oral before breakfast  polyethylene glycol 3350 17 Gram(s) Oral daily  senna 2 Tablet(s) Oral at bedtime    MEDICATIONS  (PRN):  acetaminophen   Tablet .. 650 milliGRAM(s) Oral every 6 hours PRN Temp greater or equal to 38.5C (101.3F), Mild Pain (1 - 3)  heparin   Injectable 9000 Unit(s) IV Push every 6 hours PRN For aPTT less than 40  heparin   Injectable 4000 Unit(s) IV Push every 6 hours PRN For aPTT between 40 - 57    Vital Signs Last 24 Hrs  T(C): 36.9 (08 Oct 2020 20:40), Max: 36.9 (08 Oct 2020 20:40)  T(F): 98.4 (08 Oct 2020 20:40), Max: 98.4 (08 Oct 2020 20:40)  HR: 66 (08 Oct 2020 20:40) (58 - 86)  BP: 118/66 (08 Oct 2020 20:40) (113/65 - 135/70)  BP(mean): --  RR: 18 (08 Oct 2020 20:40) (18 - 18)  SpO2: 96% (08 Oct 2020 20:40) (96% - 98%)    ENT: No congestion, ear pain, or sore throat.  Cardiovascular: No chest pain or palpation.  Respiratory: No cough, shortness of breath, congestion, or wheezing.  Gastrointestinal: No abdominal pain, nausea, vomiting, or diarrhea.  Genitourinary: No dysuria.  Musculoskeletal: No joint swelling.  Neurologic: No headache.    PHYSICAL EXAM:  GENERAL: NAD  EYES: EOMI, PERRLA  NECK: Supple, No JVD  CHEST/LUNG: crackles at bases  HEART:  S1 , S2 +  ABDOMEN: soft , bs+  EXTREMITIES:  edema+  NEUROLOGY:alert awake oriented     LABS:  10-08    135  |  91<L>  |  111<H>  ----------------------------<  175<H>  4.5   |  32<H>  |  2.02<H>    Ca    10.2      08 Oct 2020 06:17      Creatinine Trend: 2.02 <--, 2.27 <--, 2.35 <--, 2.25 <--, 2.25 <--, 2.14 <--, 2.23 <--                        8.9    8.12  )-----------( 300      ( 08 Oct 2020 06:17 )             30.9     Urine Studies:              PT/INR - ( 08 Oct 2020 08:26 )   PT: 13.1 sec;   INR: 1.12 ratio         PTT - ( 08 Oct 2020 08:26 )  PTT:72.2 sec  LIVER FUNCTIONS - ( 06 Oct 2020 06:18 )  Alb: 4.4 g/dL / Pro: x     / ALK PHOS: x     / ALT: x     / AST: x     / GGT: x           PTT - ( 07 Oct 2020 09:24 )  PTT:75.3 sec  PTT - ( 05 Oct 2020 05:58 )  PTT:88.4 sec

## 2020-10-08 NOTE — PROGRESS NOTE ADULT - SUBJECTIVE AND OBJECTIVE BOX
[facility]   [phone]  RENAL FOLLOW UP NOTE  --------------------------------------------------------------------------------  Chief Complaint:    24 hour events/subjective:    seen earlier. states breathing is improving     PAST HISTORY  --------------------------------------------------------------------------------  No significant changes to PMH, PSH, FHx, SHx, unless otherwise noted    ALLERGIES & MEDICATIONS  --------------------------------------------------------------------------------  Allergies    No Known Allergies    Intolerances      Standing Inpatient Medications  albuterol/ipratropium for Nebulization 3 milliLiter(s) Nebulizer every 6 hours  allopurinol 100 milliGRAM(s) Oral daily  atorvastatin 40 milliGRAM(s) Oral at bedtime  heparin  Infusion. 1000 Unit(s)/Hr IV Continuous <Continuous>  lidocaine   Patch 1 Patch Transdermal every 24 hours  metoprolol tartrate 25 milliGRAM(s) Oral two times a day  pantoprazole    Tablet 40 milliGRAM(s) Oral before breakfast  polyethylene glycol 3350 17 Gram(s) Oral daily  senna 2 Tablet(s) Oral at bedtime  warfarin 5 milliGRAM(s) Oral once    PRN Inpatient Medications  acetaminophen   Tablet .. 650 milliGRAM(s) Oral every 6 hours PRN  heparin   Injectable 9000 Unit(s) IV Push every 6 hours PRN  heparin   Injectable 4000 Unit(s) IV Push every 6 hours PRN      REVIEW OF SYSTEMS  --------------------------------------------------------------------------------    Gen: denies fatigue, fevers/chills,  CVS: denies chest pain/palpitations  Resp: denies worsening  SOB/Cough  GI: Denies N/V/Abd pain  : Denies dysuria/oliguria/hematuria    All other systems were reviewed and are negative, except as noted.    VITALS/PHYSICAL EXAM  --------------------------------------------------------------------------------  T(C): 36.8 (10-08-20 @ 11:58), Max: 36.9 (10-07-20 @ 20:08)  HR: 72 (10-08-20 @ 17:24) (58 - 86)  BP: 121/77 (10-08-20 @ 17:24) (113/65 - 135/70)  RR: 18 (10-08-20 @ 11:58) (18 - 18)  SpO2: 98% (10-08-20 @ 16:32) (95% - 98%)  Wt(kg): --        10-07-20 @ 07:01  -  10-08-20 @ 07:00  --------------------------------------------------------  IN: 630 mL / OUT: 1400 mL / NET: -770 mL    10-08-20 @ 07:01  -  10-08-20 @ 17:44  --------------------------------------------------------  IN: 1000 mL / OUT: 1200 mL / NET: -200 mL      Physical Exam:  	      Gen: comfortable appearing   	+ JVD  	Pulm: decrease bs  no rales or ronchi or wheezing  	CV: RRR, S1S2; no rub  	Abd: +BS, soft, nontender/nondistended  	: No suprapubic tenderness  	UE: Warm, no cyanosis  no clubbing,  no edema  	LE: Warm, no cyanosis  no clubbing, non pitting no  edema  	Neuro: alert and oriented. speech coherent       LABS/STUDIES  --------------------------------------------------------------------------------              8.9    8.12  >-----------<  300      [10-08-20 @ 06:17]              30.9     135  |  91  |  111  ----------------------------<  175      [10-08-20 @ 06:17]  4.5   |  32  |  2.02        Ca     10.2     [10-08-20 @ 06:17]      PT/INR: PT 13.1 , INR 1.12       [10-08-20 @ 08:26]  PTT: 72.2       [10-08-20 @ 08:26]      Creatinine Trend:  SCr 2.02 [10-08 @ 06:17]  SCr 2.27 [10-07 @ 01:41]  SCr 2.35 [10-06 @ 04:50]  SCr 2.25 [10-05 @ 05:58]  SCr 2.25 [10-04 @ 05:20]              Urinalysis - [09-19-20 @ 04:15]      Color Light Yellow / Appearance Clear / SG 1.016 / pH 6.0      Gluc Negative / Ketone Negative  / Bili Negative / Urobili <2 mg/dL       Blood Negative / Protein Negative / Leuk Est Negative / Nitrite Negative      RBC  / WBC  / Hyaline  / Gran  / Sq Epi  / Non Sq Epi  / Bacteria     Urine Protein 6      [10-06-20 @ 05:58]    PTH -- (Ca 10.5)      [10-06-20 @ 06:17]   183  Vitamin D (25OH) 79.0      [10-06-20 @ 06:17]  HbA1c 5.9      [11-11-17 @ 14:00]  TSH 1.92      [09-19-20 @ 10:52]    Immunofixation Serum:   No Monoclonal Band Identified    Reference Range: None Detected      [10-06-20 @ 06:18]  SPEP Interpretation: Polyclonal Gammopathy      [10-06-20 @ 06:18]

## 2020-10-08 NOTE — PROVIDER CONTACT NOTE (MEDICATION) - SITUATION
Drug dosing weight changed, patient was theraputic at 10ml/hr x2. Heparin drip reordered at same rate.   Next aPTT in the AM. Corina NEVAREZ.

## 2020-10-08 NOTE — PROGRESS NOTE ADULT - ASSESSMENT
70 year old woman with chf/ pulmonary hypertension, morbid obesity (BMI 41), HTN severe MR s/p mechanical MVR 1999 ([facility] with Dr. Anderson) and severe TR s/p TVR 2012 ([facility]), , AFib on Coumadin (s/p ablation), CKD 3 (b/l SCr ~1.3-1.6), MIGUEL and gout presented to [facility] with SOB and chest tightness transferred. required diuretics for chf. dx with mitral valve disease. required diuretics  now cr noticed to be elevated to 2.2 range.     1- MISAEL on ckd III   2- hypercalcemia   3- chf   4- anemia     misael in setting of diuretics use likely. diuretics cont to hold today. cr is slightly better   primary hyperparathyroid in setting of hypercalcemia and high intact pth suspected   anemia trend hb   renal sono pre and post void bladder sono

## 2020-10-09 LAB
ANION GAP SERPL CALC-SCNC: 12 MMOL/L — SIGNIFICANT CHANGE UP (ref 5–17)
APTT BLD: 84.4 SEC — HIGH (ref 27.5–35.5)
APTT BLD: 87.3 SEC — HIGH (ref 27.5–35.5)
BUN SERPL-MCNC: 103 MG/DL — HIGH (ref 7–23)
CALCIUM SERPL-MCNC: 10.3 MG/DL — SIGNIFICANT CHANGE UP (ref 8.4–10.5)
CHLORIDE SERPL-SCNC: 91 MMOL/L — LOW (ref 96–108)
CO2 SERPL-SCNC: 32 MMOL/L — HIGH (ref 22–31)
CREAT SERPL-MCNC: 2.07 MG/DL — HIGH (ref 0.5–1.3)
GLUCOSE SERPL-MCNC: 141 MG/DL — HIGH (ref 70–99)
HCT VFR BLD CALC: 29.5 % — LOW (ref 34.5–45)
HGB BLD-MCNC: 8.5 G/DL — LOW (ref 11.5–15.5)
INR BLD: 1.16 RATIO — SIGNIFICANT CHANGE UP (ref 0.88–1.16)
MCHC RBC-ENTMCNC: 26.6 PG — LOW (ref 27–34)
MCHC RBC-ENTMCNC: 28.8 GM/DL — LOW (ref 32–36)
MCV RBC AUTO: 92.2 FL — SIGNIFICANT CHANGE UP (ref 80–100)
NRBC # BLD: 0 /100 WBCS — SIGNIFICANT CHANGE UP (ref 0–0)
PLATELET # BLD AUTO: 307 K/UL — SIGNIFICANT CHANGE UP (ref 150–400)
POTASSIUM SERPL-MCNC: 4.5 MMOL/L — SIGNIFICANT CHANGE UP (ref 3.5–5.3)
POTASSIUM SERPL-SCNC: 4.5 MMOL/L — SIGNIFICANT CHANGE UP (ref 3.5–5.3)
PROTHROM AB SERPL-ACNC: 13.6 SEC — SIGNIFICANT CHANGE UP (ref 10.6–13.6)
RBC # BLD: 3.2 M/UL — LOW (ref 3.8–5.2)
RBC # FLD: 17.5 % — HIGH (ref 10.3–14.5)
SODIUM SERPL-SCNC: 135 MMOL/L — SIGNIFICANT CHANGE UP (ref 135–145)
WBC # BLD: 7.52 K/UL — SIGNIFICANT CHANGE UP (ref 3.8–10.5)
WBC # FLD AUTO: 7.52 K/UL — SIGNIFICANT CHANGE UP (ref 3.8–10.5)

## 2020-10-09 PROCEDURE — 99233 SBSQ HOSP IP/OBS HIGH 50: CPT

## 2020-10-09 RX ORDER — WARFARIN SODIUM 2.5 MG/1
5 TABLET ORAL ONCE
Refills: 0 | Status: COMPLETED | OUTPATIENT
Start: 2020-10-09 | End: 2020-10-09

## 2020-10-09 RX ADMIN — HEPARIN SODIUM 1000 UNIT(S)/HR: 5000 INJECTION INTRAVENOUS; SUBCUTANEOUS at 10:40

## 2020-10-09 RX ADMIN — PANTOPRAZOLE SODIUM 40 MILLIGRAM(S): 20 TABLET, DELAYED RELEASE ORAL at 05:24

## 2020-10-09 RX ADMIN — Medication 25 MILLIGRAM(S): at 05:24

## 2020-10-09 RX ADMIN — POLYETHYLENE GLYCOL 3350 17 GRAM(S): 17 POWDER, FOR SOLUTION ORAL at 11:27

## 2020-10-09 RX ADMIN — Medication 3 MILLILITER(S): at 17:14

## 2020-10-09 RX ADMIN — Medication 3 MILLILITER(S): at 05:24

## 2020-10-09 RX ADMIN — WARFARIN SODIUM 5 MILLIGRAM(S): 2.5 TABLET ORAL at 21:11

## 2020-10-09 RX ADMIN — Medication 100 MILLIGRAM(S): at 11:27

## 2020-10-09 RX ADMIN — ATORVASTATIN CALCIUM 40 MILLIGRAM(S): 80 TABLET, FILM COATED ORAL at 21:11

## 2020-10-09 RX ADMIN — Medication 25 MILLIGRAM(S): at 17:14

## 2020-10-09 RX ADMIN — Medication 3 MILLILITER(S): at 11:27

## 2020-10-09 RX ADMIN — HEPARIN SODIUM 1000 UNIT(S)/HR: 5000 INJECTION INTRAVENOUS; SUBCUTANEOUS at 18:21

## 2020-10-09 NOTE — PROGRESS NOTE ADULT - ASSESSMENT
Ms. Alcantara is a 70 year old woman with HFpEF in the context of valvular heart disease, likely rheumatic, complicated by pulmonary hypertension, morbid obesity (BMI 41), HTN (diagnosed in 30's and reportedly well controlled with medications), severe MR s/p mechanical MVR 1999 ([facility] with Dr. Anderson) and severe TR s/p TVR 2012 ([facility]), ?COPD (home 02; no prior tobacco use), AFib on Coumadin (s/p ablation), CKD 3 (b/l SCr ~1.3-1.6), MIGUEL and gout. followed by Dr. Duarte (cardiologist) who presented to [facility] with SOB and chest tightness transferred on 8/18 for possible mitral intervention for possible mitral stenosis with Dr. Fernandez. TTE performed with normal LV function with limited views to assess mitral valve however gradients were not significantly. Suspect she has a component of acute on chronic HFpEF as well as OHS/MIGUEL.    She was diuresed ~20 lbs and developed pre-renal MISAEL with low estimated RA pressure on TTE for which R/LHC performed as below. There is significant respiratory variation of her pressures but I suspect her intravascular volume status is low. She is not interested in implantation of Cardiomems due to concern that she would not be able to manage the device at home. Will continue to hold diuretics and monitor renal function which is slowly improving.     Recent studies  TTE 10/4:   images very limited, LV non-dilated, visual LVEF ~50%, dilated RV with at least moderate dysfunction, small/collapsing IVC, LVOT VTI 13 cm, mean gradient 8 mmHg across MV    R/LHC/MV fluoroscopy 10/6 (waveforms visualized):   RA ranges from 5->20 between inspiration/expiration, no dip/plateau waveforms seen   RV 51/12 without square root sign  PA 50/17 (29)  PCWP ranges from 5->25 between inspiration/expiration  LVEDP 16  CO/CI 7.6/3.6  MV opening well with calculated MVA 2.4 cm2

## 2020-10-09 NOTE — PROGRESS NOTE ADULT - SUBJECTIVE AND OBJECTIVE BOX
[facility]   [phone]  RENAL FOLLOW UP NOTE  --------------------------------------------------------------------------------  Chief Complaint:    24 hour events/subjective:    seen earlier. states no worsening sob and urinating well     PAST HISTORY  --------------------------------------------------------------------------------  No significant changes to PMH, PSH, FHx, SHx, unless otherwise noted    ALLERGIES & MEDICATIONS  --------------------------------------------------------------------------------  Allergies    No Known Allergies    Intolerances      Standing Inpatient Medications  albuterol/ipratropium for Nebulization 3 milliLiter(s) Nebulizer every 6 hours  allopurinol 100 milliGRAM(s) Oral daily  atorvastatin 40 milliGRAM(s) Oral at bedtime  heparin  Infusion. 1000 Unit(s)/Hr IV Continuous <Continuous>  lidocaine   Patch 1 Patch Transdermal every 24 hours  metoprolol tartrate 25 milliGRAM(s) Oral two times a day  pantoprazole    Tablet 40 milliGRAM(s) Oral before breakfast  polyethylene glycol 3350 17 Gram(s) Oral daily  senna 2 Tablet(s) Oral at bedtime    PRN Inpatient Medications  acetaminophen   Tablet .. 650 milliGRAM(s) Oral every 6 hours PRN  heparin   Injectable 9000 Unit(s) IV Push every 6 hours PRN  heparin   Injectable 4000 Unit(s) IV Push every 6 hours PRN      REVIEW OF SYSTEMS  --------------------------------------------------------------------------------    Gen: denies  fevers/chills,  CVS: denies chest pain/palpitations  Resp: denies SOB/Cough  GI: Denies N/V/Abd pain  : Denies dysuria    All other systems were reviewed and are negative, except as noted.    VITALS/PHYSICAL EXAM  --------------------------------------------------------------------------------  T(C): 36.2 (10-09-20 @ 11:23), Max: 36.9 (10-08-20 @ 20:40)  HR: 68 (10-09-20 @ 11:23) (59 - 83)  BP: 128/75 (10-09-20 @ 11:23) (106/66 - 128/75)  RR: 18 (10-09-20 @ 11:23) (18 - 18)  SpO2: 98% (10-09-20 @ 11:23) (95% - 98%)  Wt(kg): --        10-08-20 @ 07:01  -  10-09-20 @ 07:00  --------------------------------------------------------  IN: 1080 mL / OUT: 2150 mL / NET: -1070 mL    10-09-20 @ 07:01  -  10-09-20 @ 15:51  --------------------------------------------------------  IN: 400 mL / OUT: 700 mL / NET: -300 mL      Physical Exam:  	    Physical Exam:  	+ JVD  	Pulm: decrease bs  no rales or ronchi or wheezing  	CV: RRR, S1S2; no rub  	Abd: +BS, soft, nontender/nondistended  	: No suprapubic tenderness  	UE: Warm, no cyanosis  no clubbing,  no edema  	LE: Warm, no cyanosis  no clubbing, non pitting no  edema  	Neuro: alert and oriented. speech coherent       LABS/STUDIES  --------------------------------------------------------------------------------              8.5    7.52  >-----------<  307      [10-09-20 @ 07:02]              29.5     135  |  91  |  103  ----------------------------<  141      [10-09-20 @ 07:02]  4.5   |  32  |  2.07        Ca     10.3     [10-09-20 @ 07:02]      PT/INR: PT 13.6 , INR 1.16       [10-09-20 @ 09:58]  PTT: 87.3       [10-09-20 @ 09:34]      Creatinine Trend:  SCr 2.07 [10-09 @ 07:02]  SCr 2.02 [10-08 @ 06:17]  SCr 2.27 [10-07 @ 01:41]  SCr 2.35 [10-06 @ 04:50]  SCr 2.25 [10-05 @ 05:58]              Urinalysis - [09-19-20 @ 04:15]      Color Light Yellow / Appearance Clear / SG 1.016 / pH 6.0      Gluc Negative / Ketone Negative  / Bili Negative / Urobili <2 mg/dL       Blood Negative / Protein Negative / Leuk Est Negative / Nitrite Negative      RBC  / WBC  / Hyaline  / Gran  / Sq Epi  / Non Sq Epi  / Bacteria     Urine Protein 6      [10-06-20 @ 05:58]    PTH -- (Ca 10.5)      [10-06-20 @ 06:17]   183  Vitamin D (25OH) 79.0      [10-06-20 @ 06:17]  HbA1c 5.9      [11-11-17 @ 14:00]  TSH 1.92      [09-19-20 @ 10:52]    Immunofixation Serum:   No Monoclonal Band Identified    Reference Range: None Detected      [10-06-20 @ 06:18]  SPEP Interpretation: Polyclonal Gammopathy      [10-06-20 @ 06:18]

## 2020-10-09 NOTE — PROGRESS NOTE ADULT - ASSESSMENT
70 year old woman with chf/ pulmonary hypertension, morbid obesity (BMI 41), HTN severe MR s/p mechanical MVR 1999 ([facility] with Dr. Anderson) and severe TR s/p TVR 2012 ([facility]), , AFib on Coumadin (s/p ablation), CKD 3 (b/l SCr ~1.3-1.6), MIGUEL and gout presented to [facility] with SOB and chest tightness transferred. required diuretics for chf. dx with mitral valve disease. required diuretics  now cr noticed to be elevated to 2.2 range.     1- MISAEL on ckd III   2- hypercalcemia   3- chf   4- anemia     misael in setting of diuretics use likely. diuretics cont to hold today. cr is slightly better  bun is improving now   primary hyperparathyroid in setting of hypercalcemia and high intact pth suspected ? sensipar addition    anemia trend hb

## 2020-10-09 NOTE — PROGRESS NOTE ADULT - SUBJECTIVE AND OBJECTIVE BOX
Subjective:  No overnight events. Still reports dizziness but improving.     Medications:  acetaminophen   Tablet .. 650 milliGRAM(s) Oral every 6 hours PRN  albuterol/ipratropium for Nebulization 3 milliLiter(s) Nebulizer every 6 hours  allopurinol 100 milliGRAM(s) Oral daily  atorvastatin 40 milliGRAM(s) Oral at bedtime  heparin   Injectable 9000 Unit(s) IV Push every 6 hours PRN  heparin   Injectable 4000 Unit(s) IV Push every 6 hours PRN  heparin  Infusion. 1000 Unit(s)/Hr IV Continuous <Continuous>  lidocaine   Patch 1 Patch Transdermal every 24 hours  metoprolol tartrate 25 milliGRAM(s) Oral two times a day  pantoprazole    Tablet 40 milliGRAM(s) Oral before breakfast  polyethylene glycol 3350 17 Gram(s) Oral daily  senna 2 Tablet(s) Oral at bedtime    Vitals:  T(C): 36.2 (10-09-20 @ 11:23), Max: 36.9 (10-08-20 @ 20:40)  HR: 73 (10-09-20 @ 17:13) (59 - 73)  BP: 129/67 (10-09-20 @ 17:13) (106/66 - 129/67)  BP(mean): --  RR: 18 (10-09-20 @ 11:23) (18 - 18)  SpO2: 97% (10-09-20 @ 16:27) (95% - 98%)    Daily     Daily         I&O's Summary    08 Oct 2020 07:01  -  09 Oct 2020 07:00  --------------------------------------------------------  IN: 1080 mL / OUT: 2150 mL / NET: -1070 mL    09 Oct 2020 07:01  -  09 Oct 2020 17:51  --------------------------------------------------------  IN: 400 mL / OUT: 700 mL / NET: -300 mL        Physical Exam:  Appearance: No Acute Distress  HEENT: JVP non elevated  Cardiovascular: RRR, mechanical S1, 1/6 EMILY throughout   Respiratory: Clear to auscultation bilaterally  Gastrointestinal: Soft, Non-tender, non-distended	  Skin: no skin lesions  Neurologic: Non-focal  Extremities: No LE edema, warm and well perfused  Psychiatry: A & O x 3, Mood & affect appropriate        Labs:                        8.5    7.52  )-----------( 307      ( 09 Oct 2020 07:02 )             29.5     10-09    135  |  91<L>  |  103<H>  ----------------------------<  141<H>  4.5   |  32<H>  |  2.07<H>    Ca    10.3      09 Oct 2020 07:02        PT/INR - ( 09 Oct 2020 09:58 )   PT: 13.6 sec;   INR: 1.16 ratio         PTT - ( 09 Oct 2020 16:53 )  PTT:84.4 sec

## 2020-10-09 NOTE — PROGRESS NOTE ADULT - SUBJECTIVE AND OBJECTIVE BOX
SUBJECTIVE / OVERNIGHT EVENTS: pt denies chest pain, shortness of breath says she feels much better than before    MEDICATIONS  (STANDING):  albuterol/ipratropium for Nebulization 3 milliLiter(s) Nebulizer every 6 hours  allopurinol 100 milliGRAM(s) Oral daily  atorvastatin 40 milliGRAM(s) Oral at bedtime  heparin  Infusion. 1000 Unit(s)/Hr (10 mL/Hr) IV Continuous <Continuous>  lidocaine   Patch 1 Patch Transdermal every 24 hours  metoprolol tartrate 25 milliGRAM(s) Oral two times a day  pantoprazole    Tablet 40 milliGRAM(s) Oral before breakfast  polyethylene glycol 3350 17 Gram(s) Oral daily  senna 2 Tablet(s) Oral at bedtime    MEDICATIONS  (PRN):  acetaminophen   Tablet .. 650 milliGRAM(s) Oral every 6 hours PRN Temp greater or equal to 38.5C (101.3F), Mild Pain (1 - 3)  heparin   Injectable 4000 Unit(s) IV Push every 6 hours PRN For aPTT between 40 - 57  heparin   Injectable 9000 Unit(s) IV Push every 6 hours PRN For aPTT less than 40    Vital Signs Last 24 Hrs  T(C): 36.8 (09 Oct 2020 20:24), Max: 36.8 (09 Oct 2020 20:24)  T(F): 98.2 (09 Oct 2020 20:24), Max: 98.2 (09 Oct 2020 20:24)  HR: 63 (09 Oct 2020 20:24) (59 - 73)  BP: 127/65 (09 Oct 2020 20:24) (106/66 - 129/67)  BP(mean): --  RR: 19 (09 Oct 2020 20:24) (18 - 19)  SpO2: 98% (09 Oct 2020 20:24) (95% - 98%)    ENT: No congestion, ear pain, or sore throat.  Cardiovascular: No chest pain or palpation.  Respiratory: No cough, shortness of breath, congestion, or wheezing.  Gastrointestinal: No abdominal pain, nausea, vomiting, or diarrhea.  Genitourinary: No dysuria.  Musculoskeletal: No joint swelling.  Neurologic: No headache.    PHYSICAL EXAM:  GENERAL: NAD  EYES: EOMI, PERRLA  NECK: Supple, No JVD  CHEST/LUNG: crackles at bases  HEART:  S1 , S2 +  ABDOMEN: soft , bs+  EXTREMITIES:  edema+  LABS:  10-09    135  |  91<L>  |  103<H>  ----------------------------<  141<H>  4.5   |  32<H>  |  2.07<H>    Ca    10.3      09 Oct 2020 07:02      Creatinine Trend: 2.07 <--, 2.02 <--, 2.27 <--, 2.35 <--, 2.25 <--, 2.25 <--, 2.14 <--                        8.5    7.52  )-----------( 307      ( 09 Oct 2020 07:02 )             29.5     Urine Studies:              PT/INR - ( 09 Oct 2020 09:58 )   PT: 13.6 sec;   INR: 1.16 ratio         PTT - ( 09 Oct 2020 16:53 )  PTT:84.4 sec  NEUROLOGY:alert awake oriented

## 2020-10-09 NOTE — PROGRESS NOTE ADULT - PROBLEM SELECTOR PLAN 3
- Currently in NSR  - Continue heparin drip  - Continue coumadin, goal INR 2.5-3.5 given mechanical MVR

## 2020-10-10 DIAGNOSIS — N17.9 ACUTE KIDNEY FAILURE, UNSPECIFIED: ICD-10-CM

## 2020-10-10 DIAGNOSIS — E83.52 HYPERCALCEMIA: ICD-10-CM

## 2020-10-10 LAB
ANION GAP SERPL CALC-SCNC: 9 MMOL/L — SIGNIFICANT CHANGE UP (ref 5–17)
APTT BLD: 93.4 SEC — HIGH (ref 27.5–35.5)
BUN SERPL-MCNC: 96 MG/DL — HIGH (ref 7–23)
CALCIUM SERPL-MCNC: 10.4 MG/DL — SIGNIFICANT CHANGE UP (ref 8.4–10.5)
CHLORIDE SERPL-SCNC: 93 MMOL/L — LOW (ref 96–108)
CO2 SERPL-SCNC: 34 MMOL/L — HIGH (ref 22–31)
CREAT SERPL-MCNC: 1.79 MG/DL — HIGH (ref 0.5–1.3)
GLUCOSE SERPL-MCNC: 141 MG/DL — HIGH (ref 70–99)
HCT VFR BLD CALC: 28.8 % — LOW (ref 34.5–45)
HGB BLD-MCNC: 8.2 G/DL — LOW (ref 11.5–15.5)
INR BLD: 1.15 RATIO — SIGNIFICANT CHANGE UP (ref 0.88–1.16)
MCHC RBC-ENTMCNC: 26.3 PG — LOW (ref 27–34)
MCHC RBC-ENTMCNC: 28.5 GM/DL — LOW (ref 32–36)
MCV RBC AUTO: 92.3 FL — SIGNIFICANT CHANGE UP (ref 80–100)
NRBC # BLD: 0 /100 WBCS — SIGNIFICANT CHANGE UP (ref 0–0)
PLATELET # BLD AUTO: 315 K/UL — SIGNIFICANT CHANGE UP (ref 150–400)
POTASSIUM SERPL-MCNC: 5 MMOL/L — SIGNIFICANT CHANGE UP (ref 3.5–5.3)
POTASSIUM SERPL-SCNC: 5 MMOL/L — SIGNIFICANT CHANGE UP (ref 3.5–5.3)
PROTHROM AB SERPL-ACNC: 13.6 SEC — SIGNIFICANT CHANGE UP (ref 10.6–13.6)
RBC # BLD: 3.12 M/UL — LOW (ref 3.8–5.2)
RBC # FLD: 17.5 % — HIGH (ref 10.3–14.5)
SODIUM SERPL-SCNC: 136 MMOL/L — SIGNIFICANT CHANGE UP (ref 135–145)
WBC # BLD: 7.49 K/UL — SIGNIFICANT CHANGE UP (ref 3.8–10.5)
WBC # FLD AUTO: 7.49 K/UL — SIGNIFICANT CHANGE UP (ref 3.8–10.5)

## 2020-10-10 RX ORDER — WARFARIN SODIUM 2.5 MG/1
5 TABLET ORAL ONCE
Refills: 0 | Status: COMPLETED | OUTPATIENT
Start: 2020-10-10 | End: 2020-10-10

## 2020-10-10 RX ORDER — BUMETANIDE 0.25 MG/ML
2 INJECTION INTRAMUSCULAR; INTRAVENOUS ONCE
Refills: 0 | Status: COMPLETED | OUTPATIENT
Start: 2020-10-10 | End: 2020-10-10

## 2020-10-10 RX ORDER — ONDANSETRON 8 MG/1
4 TABLET, FILM COATED ORAL ONCE
Refills: 0 | Status: COMPLETED | OUTPATIENT
Start: 2020-10-10 | End: 2020-10-10

## 2020-10-10 RX ADMIN — ONDANSETRON 4 MILLIGRAM(S): 8 TABLET, FILM COATED ORAL at 14:03

## 2020-10-10 RX ADMIN — BUMETANIDE 2 MILLIGRAM(S): 0.25 INJECTION INTRAMUSCULAR; INTRAVENOUS at 12:56

## 2020-10-10 RX ADMIN — Medication 100 MILLIGRAM(S): at 11:56

## 2020-10-10 RX ADMIN — Medication 25 MILLIGRAM(S): at 18:46

## 2020-10-10 RX ADMIN — WARFARIN SODIUM 5 MILLIGRAM(S): 2.5 TABLET ORAL at 21:22

## 2020-10-10 RX ADMIN — Medication 3 MILLILITER(S): at 11:55

## 2020-10-10 RX ADMIN — Medication 3 MILLILITER(S): at 17:34

## 2020-10-10 RX ADMIN — Medication 3 MILLILITER(S): at 00:53

## 2020-10-10 RX ADMIN — HEPARIN SODIUM 1000 UNIT(S)/HR: 5000 INJECTION INTRAVENOUS; SUBCUTANEOUS at 13:27

## 2020-10-10 RX ADMIN — Medication 25 MILLIGRAM(S): at 06:08

## 2020-10-10 RX ADMIN — ATORVASTATIN CALCIUM 40 MILLIGRAM(S): 80 TABLET, FILM COATED ORAL at 21:22

## 2020-10-10 RX ADMIN — PANTOPRAZOLE SODIUM 40 MILLIGRAM(S): 20 TABLET, DELAYED RELEASE ORAL at 06:08

## 2020-10-10 RX ADMIN — Medication 3 MILLILITER(S): at 06:08

## 2020-10-10 NOTE — PROGRESS NOTE ADULT - PROBLEM SELECTOR PLAN 1
resolving with diuretics held, creatinine approaching baseline  patient reports feeling symptomatically SOB with minimal exertion today  will dose bumex 2mg PO x 1 today  monitor BMP daily  dose meds for eGFR ~25-30

## 2020-10-10 NOTE — PROGRESS NOTE ADULT - PROBLEM SELECTOR PLAN 3
in setting of elevated PTH, likely primary hyperparathyroidism  hypercalcemia mild at this time, will need to monitor for need for cinacalcet

## 2020-10-10 NOTE — PROGRESS NOTE ADULT - ASSESSMENT
70 year old woman with chf/ pulmonary hypertension, morbid obesity (BMI 41), HTN severe MR s/p mechanical MVR 1999 ([facility] with Dr. Anderson) and severe TR s/p TVR 2012 ([facility]), , AFib on Coumadin (s/p ablation), CKD 3 (b/l SCr ~1.3-1.6), MIGUEL and gout presented to [facility] with SOB and chest tightness transferred. required diuretics for chf, now with MISAEL and hypercalcemia

## 2020-10-10 NOTE — PROGRESS NOTE ADULT - SUBJECTIVE AND OBJECTIVE BOX
[facility]   [phone]  Dr. Crane (covering for Dr. Frias)  RENAL FOLLOW UP NOTE  --------------------------------------------------------------------------------  Patient seen and examined.  Standing at bedside, reports feeling "breathless at times"    PAST HISTORY  --------------------------------------------------------------------------------  No significant changes to PMH, PSH, FHx, SHx, unless otherwise noted    ALLERGIES & MEDICATIONS  --------------------------------------------------------------------------------  Allergies    No Known Allergies    Intolerances      Standing Inpatient Medications  albuterol/ipratropium for Nebulization 3 milliLiter(s) Nebulizer every 6 hours  allopurinol 100 milliGRAM(s) Oral daily  atorvastatin 40 milliGRAM(s) Oral at bedtime  buMETAnide 2 milliGRAM(s) Oral once  heparin  Infusion. 1000 Unit(s)/Hr IV Continuous <Continuous>  lidocaine   Patch 1 Patch Transdermal every 24 hours  metoprolol tartrate 25 milliGRAM(s) Oral two times a day  pantoprazole    Tablet 40 milliGRAM(s) Oral before breakfast  polyethylene glycol 3350 17 Gram(s) Oral daily  senna 2 Tablet(s) Oral at bedtime    PRN Inpatient Medications  acetaminophen   Tablet .. 650 milliGRAM(s) Oral every 6 hours PRN  heparin   Injectable 4000 Unit(s) IV Push every 6 hours PRN  heparin   Injectable 9000 Unit(s) IV Push every 6 hours PRN      FOCUSED REVIEW OF SYSTEMS  --------------------------------------------------------------------------------  denies fevers/rigors  denies CP/palpitations  +VILLA, denies SOB at rest  denies oliguria/dysuria      VITALS/PHYSICAL EXAM  --------------------------------------------------------------------------------  T(C): 36.5 (10-10-20 @ 11:28), Max: 36.8 (10-09-20 @ 20:24)  HR: 71 (10-10-20 @ 11:28) (59 - 139)  BP: 152/74 (10-10-20 @ 11:28) (115/70 - 152/74)  RR: 18 (10-10-20 @ 11:28) (18 - 19)  SpO2: 97% (10-10-20 @ 11:28) (93% - 100%)  Wt(kg): --        10-09-20 @ 07:01  -  10-10-20 @ 07:00  --------------------------------------------------------  IN: 1240 mL / OUT: 1400 mL / NET: -160 mL      Physical Exam:  	Gen: NAD, obese  	Pulm: bibasilar crackles  	CV: RRR, S1S2  	Abd: +BS, soft, nontender/nondistended  	: No suprapubic tenderness.  no sarmiento          Extremity: No cyanosis, b/l LE edema at lower shins  	Neuro: A&O x 3      LABS/STUDIES  --------------------------------------------------------------------------------              8.2    7.49  >-----------<  315      [10-10-20 @ 08:41]              28.8     136  |  93  |  96  ----------------------------<  141      [10-10-20 @ 08:41]  5.0   |  34  |  1.79        Ca     10.4     [10-10-20 @ 08:41]      PT/INR: PT 13.6 , INR 1.16       [10-09-20 @ 09:58]  PTT: 84.4       [10-09-20 @ 16:53]      eGFR if Non African American: 28 mL/min/1.73M2 (10-10 @ 08:41)  eGFR if : 32 mL/min/1.73M2 (10-10 @ 08:41)    Creatinine Trend:  SCr 1.79 [10-10 @ 08:41]  SCr 2.07 [10-09 @ 07:02]  SCr 2.02 [10-08 @ 06:17]  SCr 2.27 [10-07 @ 01:41]  SCr 2.35 [10-06 @ 04:50]              Urinalysis - [09-19-20 @ 04:15]      Color Light Yellow / Appearance Clear / SG 1.016 / pH 6.0      Gluc Negative / Ketone Negative  / Bili Negative / Urobili <2 mg/dL       Blood Negative / Protein Negative / Leuk Est Negative / Nitrite Negative      RBC  / WBC  / Hyaline  / Gran  / Sq Epi  / Non Sq Epi  / Bacteria     Urine Protein 6      [10-06-20 @ 05:58]    PTH -- (Ca 10.5)      [10-06-20 @ 06:17]   183  Vitamin D (25OH) 79.0      [10-06-20 @ 06:17]  HbA1c 5.9      [11-11-17 @ 14:00]  TSH 1.92      [09-19-20 @ 10:52]    Immunofixation Serum:   No Monoclonal Band Identified    Reference Range: None Detected      [10-06-20 @ 06:18]  SPEP Interpretation: Polyclonal Gammopathy      [10-06-20 @ 06:18]

## 2020-10-10 NOTE — PROGRESS NOTE ADULT - SUBJECTIVE AND OBJECTIVE BOX
SUBJECTIVE / OVERNIGHT EVENTS: pt denies chest pain, shortness of breath says she feels much better than before    MEDICATIONS  (STANDING):  albuterol/ipratropium for Nebulization 3 milliLiter(s) Nebulizer every 6 hours  allopurinol 100 milliGRAM(s) Oral daily  atorvastatin 40 milliGRAM(s) Oral at bedtime  heparin  Infusion. 1000 Unit(s)/Hr (10 mL/Hr) IV Continuous <Continuous>  lidocaine   Patch 1 Patch Transdermal every 24 hours  metoprolol tartrate 25 milliGRAM(s) Oral two times a day  pantoprazole    Tablet 40 milliGRAM(s) Oral before breakfast  polyethylene glycol 3350 17 Gram(s) Oral daily  senna 2 Tablet(s) Oral at bedtime    MEDICATIONS  (PRN):  acetaminophen   Tablet .. 650 milliGRAM(s) Oral every 6 hours PRN Temp greater or equal to 38.5C (101.3F), Mild Pain (1 - 3)  heparin   Injectable 9000 Unit(s) IV Push every 6 hours PRN For aPTT less than 40  heparin   Injectable 4000 Unit(s) IV Push every 6 hours PRN For aPTT between 40 - 57    Vital Signs Last 24 Hrs  T(C): 36.7 (10 Oct 2020 20:05), Max: 36.7 (10 Oct 2020 20:05)  T(F): 98 (10 Oct 2020 20:05), Max: 98 (10 Oct 2020 20:05)  HR: 65 (10 Oct 2020 20:05) (59 - 139)  BP: 125/68 (10 Oct 2020 20:05) (99/63 - 152/74)  BP(mean): --  RR: 19 (10 Oct 2020 20:05) (18 - 19)  SpO2: 100% (10 Oct 2020 20:05) (93% - 100%)    ENT: No congestion, ear pain, or sore throat.  Cardiovascular: No chest pain or palpation.  Respiratory: No cough, shortness of breath, congestion, or wheezing.  Gastrointestinal: No abdominal pain, nausea, vomiting, or diarrhea.  Genitourinary: No dysuria.  Musculoskeletal: No joint swelling.  Neurologic: No headache.    PHYSICAL EXAM:  GENERAL: NAD  EYES: EOMI, PERRLA  NECK: Supple, No JVD  CHEST/LUNG: crackles at bases  HEART:  S1 , S2 +  ABDOMEN: soft , bs+  EXTREMITIES:  edema+        INTERVAL HPI/OVERNIGHT EVENTS:  Vital Signs Last 24 Hrs  T(C): 36.7 (10 Oct 2020 20:05), Max: 36.7 (10 Oct 2020 20:05)  T(F): 98 (10 Oct 2020 20:05), Max: 98 (10 Oct 2020 20:05)  HR: 65 (10 Oct 2020 20:05) (59 - 139)  BP: 125/68 (10 Oct 2020 20:05) (99/63 - 152/74)  BP(mean): --  RR: 19 (10 Oct 2020 20:05) (18 - 19)  SpO2: 100% (10 Oct 2020 20:05) (93% - 100%)  I&O's Summary    09 Oct 2020 07:01  -  10 Oct 2020 07:00  --------------------------------------------------------  IN: 1240 mL / OUT: 1400 mL / NET: -160 mL    10 Oct 2020 07:01  -  10 Oct 2020 22:53  --------------------------------------------------------  IN: 240 mL / OUT: 900 mL / NET: -660 mL      MEDICATIONS  (STANDING):  albuterol/ipratropium for Nebulization 3 milliLiter(s) Nebulizer every 6 hours  allopurinol 100 milliGRAM(s) Oral daily  atorvastatin 40 milliGRAM(s) Oral at bedtime  heparin  Infusion. 1000 Unit(s)/Hr (10 mL/Hr) IV Continuous <Continuous>  lidocaine   Patch 1 Patch Transdermal every 24 hours  metoprolol tartrate 25 milliGRAM(s) Oral two times a day  pantoprazole    Tablet 40 milliGRAM(s) Oral before breakfast  polyethylene glycol 3350 17 Gram(s) Oral daily  senna 2 Tablet(s) Oral at bedtime    MEDICATIONS  (PRN):  acetaminophen   Tablet .. 650 milliGRAM(s) Oral every 6 hours PRN Temp greater or equal to 38.5C (101.3F), Mild Pain (1 - 3)  heparin   Injectable 9000 Unit(s) IV Push every 6 hours PRN For aPTT less than 40  heparin   Injectable 4000 Unit(s) IV Push every 6 hours PRN For aPTT between 40 - 57    LABS:                        8.2    7.49  )-----------( 315      ( 10 Oct 2020 08:41 )             28.8     10-10    136  |  93<L>  |  96<H>  ----------------------------<  141<H>  5.0   |  34<H>  |  1.79<H>    Ca    10.4      10 Oct 2020 08:41      PT/INR - ( 10 Oct 2020 12:30 )   PT: 13.6 sec;   INR: 1.15 ratio         PTT - ( 10 Oct 2020 12:30 )  PTT:93.4 sec    CAPILLARY BLOOD GLUCOSE              REVIEW OF SYSTEMS:  CONSTITUTIONAL: No fever, weight loss, or fatigue  EYES: No eye pain, visual disturbances, or discharge  ENMT:  No difficulty hearing, tinnitus, vertigo; No sinus or throat pain  NECK: No pain or stiffness  BREASTS: No pain, masses, or nipple discharge  RESPIRATORY: No cough, wheezing, chills or hemoptysis; No shortness of breath  CARDIOVASCULAR: No chest pain, palpitations, dizziness, or leg swelling  GASTROINTESTINAL: No abdominal or epigastric pain. No nausea, vomiting, or hematemesis; No diarrhea or constipation. No melena or hematochezia.  GENITOURINARY: No dysuria, frequency, hematuria, or incontinence  NEUROLOGICAL: No headaches, memory loss, loss of strength, numbness, or tremors  SKIN: No itching, burning, rashes, or lesions   LYMPH NODES: No enlarged glands  ENDOCRINE: No heat or cold intolerance; No hair loss  MUSCULOSKELETAL: No joint pain or swelling; No muscle, back, or extremity pain  PSYCHIATRIC: No depression, anxiety, mood swings, or difficulty sleeping  HEME/LYMPH: No easy bruising, or bleeding gums  ALLERY AND IMMUNOLOGIC: No hives or eczema    RADIOLOGY & ADDITIONAL TESTS:    Imaging Personally Reviewed:  [ ] YES  [ ] NO    Consultant(s) Notes Reviewed:  [x ] YES  [ ] NO    PHYSICAL EXAM:  GENERAL: NAD, well-groomed, well-developed  HEAD:  Atraumatic, Normocephalic  EYES: EOMI, PERRLA, conjunctiva and sclera clear  ENMT: No tonsillar erythema, exudates, or enlargement; Moist mucous membranes, Good dentition, No lesions  NECK: Supple, No JVD, Normal thyroid  NERVOUS SYSTEM:  Alert & Oriented X3, Good concentration; Motor Strength 5/5 B/L upper and lower extremities; DTRs 2+ intact and symmetric  CHEST/LUNG: Clear to percussion bilaterally; No rales, rhonchi, wheezing, or rubs  HEART: Regular rate and rhythm; No murmurs, rubs, or gallops  ABDOMEN: Soft, Nontender, Nondistended; Bowel sounds present  EXTREMITIES:  2+ Peripheral Pulses, No clubbing, cyanosis, or edema  LYMPH: No lymphadenopathy noted  SKIN: No rashes or lesions    Care Discussed with Consultants/Other Providers [ x] YES  [ ] NO  PT/INR - ( 09 Oct 2020 09:58 )   PT: 13.6 sec;   INR: 1.16 ratio         PTT - ( 09 Oct 2020 16:53 )  PTT:84.4 sec  NEUROLOGY:alert awake oriented

## 2020-10-11 LAB
ANION GAP SERPL CALC-SCNC: 12 MMOL/L — SIGNIFICANT CHANGE UP (ref 5–17)
APTT BLD: 81.2 SEC — HIGH (ref 27.5–35.5)
BUN SERPL-MCNC: 94 MG/DL — HIGH (ref 7–23)
CALCIUM SERPL-MCNC: 10.5 MG/DL — SIGNIFICANT CHANGE UP (ref 8.4–10.5)
CHLORIDE SERPL-SCNC: 92 MMOL/L — LOW (ref 96–108)
CO2 SERPL-SCNC: 33 MMOL/L — HIGH (ref 22–31)
CREAT SERPL-MCNC: 1.91 MG/DL — HIGH (ref 0.5–1.3)
GLUCOSE SERPL-MCNC: 100 MG/DL — HIGH (ref 70–99)
HCT VFR BLD CALC: 28.4 % — LOW (ref 34.5–45)
HGB BLD-MCNC: 7.9 G/DL — LOW (ref 11.5–15.5)
INR BLD: 1.15 RATIO — SIGNIFICANT CHANGE UP (ref 0.88–1.16)
MCHC RBC-ENTMCNC: 25.9 PG — LOW (ref 27–34)
MCHC RBC-ENTMCNC: 27.8 GM/DL — LOW (ref 32–36)
MCV RBC AUTO: 93.1 FL — SIGNIFICANT CHANGE UP (ref 80–100)
NRBC # BLD: 0 /100 WBCS — SIGNIFICANT CHANGE UP (ref 0–0)
PLATELET # BLD AUTO: 311 K/UL — SIGNIFICANT CHANGE UP (ref 150–400)
POTASSIUM SERPL-MCNC: 5 MMOL/L — SIGNIFICANT CHANGE UP (ref 3.5–5.3)
POTASSIUM SERPL-SCNC: 5 MMOL/L — SIGNIFICANT CHANGE UP (ref 3.5–5.3)
PROTHROM AB SERPL-ACNC: 13.5 SEC — SIGNIFICANT CHANGE UP (ref 10.6–13.6)
RBC # BLD: 3.05 M/UL — LOW (ref 3.8–5.2)
RBC # FLD: 17.6 % — HIGH (ref 10.3–14.5)
SODIUM SERPL-SCNC: 137 MMOL/L — SIGNIFICANT CHANGE UP (ref 135–145)
WBC # BLD: 6.73 K/UL — SIGNIFICANT CHANGE UP (ref 3.8–10.5)
WBC # FLD AUTO: 6.73 K/UL — SIGNIFICANT CHANGE UP (ref 3.8–10.5)

## 2020-10-11 PROCEDURE — 99233 SBSQ HOSP IP/OBS HIGH 50: CPT | Mod: GC

## 2020-10-11 RX ORDER — WARFARIN SODIUM 2.5 MG/1
5 TABLET ORAL ONCE
Refills: 0 | Status: COMPLETED | OUTPATIENT
Start: 2020-10-11 | End: 2020-10-11

## 2020-10-11 RX ADMIN — WARFARIN SODIUM 5 MILLIGRAM(S): 2.5 TABLET ORAL at 21:41

## 2020-10-11 RX ADMIN — Medication 25 MILLIGRAM(S): at 05:06

## 2020-10-11 RX ADMIN — ATORVASTATIN CALCIUM 40 MILLIGRAM(S): 80 TABLET, FILM COATED ORAL at 21:41

## 2020-10-11 RX ADMIN — HEPARIN SODIUM 1000 UNIT(S)/HR: 5000 INJECTION INTRAVENOUS; SUBCUTANEOUS at 09:29

## 2020-10-11 RX ADMIN — Medication 3 MILLILITER(S): at 17:09

## 2020-10-11 RX ADMIN — Medication 3 MILLILITER(S): at 23:48

## 2020-10-11 RX ADMIN — Medication 3 MILLILITER(S): at 05:06

## 2020-10-11 RX ADMIN — Medication 3 MILLILITER(S): at 11:48

## 2020-10-11 RX ADMIN — Medication 3 MILLILITER(S): at 00:25

## 2020-10-11 RX ADMIN — Medication 25 MILLIGRAM(S): at 17:09

## 2020-10-11 RX ADMIN — PANTOPRAZOLE SODIUM 40 MILLIGRAM(S): 20 TABLET, DELAYED RELEASE ORAL at 05:06

## 2020-10-11 RX ADMIN — Medication 100 MILLIGRAM(S): at 11:48

## 2020-10-11 NOTE — PROGRESS NOTE ADULT - PROBLEM SELECTOR PLAN 1
slight worsening of azotemia overnight but symptomatically better s/p bumex x 1 yesterday  defer further diuretics today unless patient becomes symptomatic  monitor BMP daily  dose meds for eGFR ~25-30

## 2020-10-11 NOTE — PROGRESS NOTE ADULT - SUBJECTIVE AND OBJECTIVE BOX
Patient seen and examined at bedside.    Overnight Events: No acute events overnight       REVIEW OF SYSTEMS:  Constitutional:     [ ] negative [ ] fevers [ ] chills [ ] weight loss [ ] weight gain  HEENT:                  [ ] negative [ ] dry eyes [ ] eye irritation [ ] postnasal drip [ ] nasal congestion  CV:                         [ ] negative  [ ] chest pain [ ] orthopnea [ ] palpitations [ ] murmur  Resp:                     [ ] negative [ ] cough [ ] shortness of breath [ ] dyspnea [ ] wheezing [ ] sputum [ ]hemoptysis  GI:                          [ ] negative [ ] nausea [ ] vomiting [ ] diarrhea [ ] constipation [ ] abd pain [ ] dysphagia   :                        [ ] negative [ ] dysuria [ ] nocturia [ ] hematuria [ ] increased urinary frequency  Musculoskeletal: [ ] negative [ ] back pain [ ] myalgias [ ] arthralgias [ ] fracture  Skin:                       [ ] negative [ ] rash [ ] itch  Neurological:        [ ] negative [ ] headache [ ] dizziness [ ] syncope [ ] weakness [ ] numbness  Psychiatric:           [ ] negative [ ] anxiety [ ] depression  Endocrine:            [ ] negative [ ] diabetes [ ] thyroid problem  Heme/Lymph:      [ ] negative [ ] anemia [ ] bleeding problem  Allergic/Immune: [ ] negative [ ] itchy eyes [ ] nasal discharge [ ] hives [ ] angioedema    [x ] All other systems negative  [ ] Unable to assess ROS due to    Current Meds:  acetaminophen   Tablet .. 650 milliGRAM(s) Oral every 6 hours PRN  albuterol/ipratropium for Nebulization 3 milliLiter(s) Nebulizer every 6 hours  allopurinol 100 milliGRAM(s) Oral daily  atorvastatin 40 milliGRAM(s) Oral at bedtime  heparin   Injectable 9000 Unit(s) IV Push every 6 hours PRN  heparin   Injectable 4000 Unit(s) IV Push every 6 hours PRN  heparin  Infusion. 1000 Unit(s)/Hr IV Continuous <Continuous>  lidocaine   Patch 1 Patch Transdermal every 24 hours  metoprolol tartrate 25 milliGRAM(s) Oral two times a day  pantoprazole    Tablet 40 milliGRAM(s) Oral before breakfast  polyethylene glycol 3350 17 Gram(s) Oral daily  senna 2 Tablet(s) Oral at bedtime      PAST MEDICAL & SURGICAL HISTORY:  COPD (chronic obstructive pulmonary disease)    Hypertension    CHF (congestive heart failure)    Tricuspid valve replaced    Mitral valve replaced    Gout    HTN (hypertension)    CHF (congestive heart failure)    COPD (chronic obstructive pulmonary disease)  on home O2    MIGUEL (obstructive sleep apnea)    Pulmonary hypertension    Atrial fibrillation  S/P Ablation    No significant past surgical history    Tricuspid valve replaced  mechanical    History of mitral valve replacement with mechanical valve        Vitals:  T(F): 98.7 (10-11), Max: 98.7 (10-11)  HR: 62 (10-11) (62 - 77)  BP: 119/63 (10-11) (99/63 - 125/68)  RR: 18 (10-11)  SpO2: 100% (10-11)  I&O's Summary    10 Oct 2020 07:01  -  11 Oct 2020 07:00  --------------------------------------------------------  IN: 850 mL / OUT: 900 mL / NET: -50 mL    11 Oct 2020 07:01  -  11 Oct 2020 13:50  --------------------------------------------------------  IN: 0 mL / OUT: 500 mL / NET: -500 mL        Physical Exam:  Appearance: No acute distress; well appearing  Eyes: PERRL, EOMI, pink conjunctiva  HENT: Normal oral mucosa  Cardiovascular: RRR, S1, S2, no murmurs, rubs, or gallops; no edema; no JVD  Respiratory: Clear to auscultation bilaterally  Gastrointestinal: soft, non-tender, non-distended with normal bowel sounds  Musculoskeletal: No clubbing; no joint deformity   Neurologic: Non-focal  Lymphatic: No lymphadenopathy  Psychiatry: AAOx3, mood & affect appropriate  Skin: No rashes, ecchymoses, or cyanosis                          7.9    6.73  )-----------( 311      ( 11 Oct 2020 06:32 )             28.4     10-11    137  |  92<L>  |  94<H>  ----------------------------<  100<H>  5.0   |  33<H>  |  1.91<H>    Ca    10.5      11 Oct 2020 06:32      PT/INR - ( 11 Oct 2020 08:42 )   PT: 13.5 sec;   INR: 1.15 ratio         PTT - ( 11 Oct 2020 08:42 )  PTT:81.2 sec              New ECG(s): Personally reviewed    Echo:  cho< from: TTE with Doppler (w/Cont) (10.04.20 @ 09:52) >  Conclusions:  1. Mechanical prosthetic mitral valve replacement. Mean  transmitral valve gradient equals 8 mm Hg, which is  elevated even in the setting of a mechanical prosthetic  mitral valve replacement.  2. Aortic valve not well visualized; appears calcified.  Peak transaortic valve gradient equals 10 mm Hg, mean  transaortic valve gradient equals 6 mm Hg, aortic valve  velocity time integral equals 27 cm. Minimal aortic  regurgitation.  3. Normal left ventricular systolic function. No segmental  wall motion abnormalities. Endocardial visualization  enhanced with intravenous injection of Ultrasonic Enhancing  Agent (Definity).  Septal motion consistent with cardiac  surgery.  4. The right ventricle is not well visualized appears  enlarged..    < end of copied text >      Interpretation of Telemetry: Sinus 60 - 80 APCs

## 2020-10-11 NOTE — PROGRESS NOTE ADULT - SUBJECTIVE AND OBJECTIVE BOX
[facility]   [phone]  Dr. Crane (covering for Dr. Frias)  RENAL FOLLOW UP NOTE  --------------------------------------------------------------------------------  Patient seen and examined.  Reports breathing is improved s/p one dose bumex yesterday, with appropriate increase in urine output    PAST HISTORY  --------------------------------------------------------------------------------  No significant changes to PMH, PSH, FHx, SHx, unless otherwise noted    ALLERGIES & MEDICATIONS  --------------------------------------------------------------------------------  Allergies    No Known Allergies    Intolerances      Standing Inpatient Medications  albuterol/ipratropium for Nebulization 3 milliLiter(s) Nebulizer every 6 hours  allopurinol 100 milliGRAM(s) Oral daily  atorvastatin 40 milliGRAM(s) Oral at bedtime  heparin  Infusion. 1000 Unit(s)/Hr IV Continuous <Continuous>  lidocaine   Patch 1 Patch Transdermal every 24 hours  metoprolol tartrate 25 milliGRAM(s) Oral two times a day  pantoprazole    Tablet 40 milliGRAM(s) Oral before breakfast  polyethylene glycol 3350 17 Gram(s) Oral daily  senna 2 Tablet(s) Oral at bedtime    PRN Inpatient Medications  acetaminophen   Tablet .. 650 milliGRAM(s) Oral every 6 hours PRN  heparin   Injectable 9000 Unit(s) IV Push every 6 hours PRN  heparin   Injectable 4000 Unit(s) IV Push every 6 hours PRN      FOCUSED REVIEW OF SYSTEMS  --------------------------------------------------------------------------------  denies fevers/rigors  denies CP/palpitations  denies SOB/cough  denies oliguria/dysuria/hematuria      VITALS/PHYSICAL EXAM  --------------------------------------------------------------------------------  T(C): 36.6 (10-11-20 @ 04:37), Max: 36.7 (10-10-20 @ 20:05)  HR: 65 (10-11-20 @ 10:00) (59 - 77)  BP: 112/64 (10-11-20 @ 04:37) (99/63 - 152/74)  RR: 18 (10-11-20 @ 04:37) (18 - 19)  SpO2: 99% (10-11-20 @ 10:00) (97% - 100%)  Wt(kg): --        10-10-20 @ 07:01  -  10-11-20 @ 07:00  --------------------------------------------------------  IN: 850 mL / OUT: 900 mL / NET: -50 mL      Physical Exam:  	Gen: NAD, obese  	Pulm: decreased breath sounds b/l bases  	CV: RRR, S1S2  	Abd: +BS, soft, nontender/nondistended  	: No suprapubic tenderness.  no sarmiento          Extremity: No cyanosis, b/l LE edema at lower shins  	Neuro: A&O x 3    LABS/STUDIES  --------------------------------------------------------------------------------              7.9    6.73  >-----------<  311      [10-11-20 @ 06:32]              28.4     137  |  92  |  94  ----------------------------<  100      [10-11-20 @ 06:32]  5.0   |  33  |  1.91        Ca     10.5     [10-11-20 @ 06:32]      PT/INR: PT 13.5 , INR 1.15       [10-11-20 @ 08:42]  PTT: 81.2       [10-11-20 @ 08:42]      eGFR if Non African American: 26 mL/min/1.73M2 (10-11 @ 06:32)  eGFR if African American: 30 mL/min/1.73M2 (10-11 @ 06:32)    Creatinine Trend:  SCr 1.91 [10-11 @ 06:32]  SCr 1.79 [10-10 @ 08:41]  SCr 2.07 [10-09 @ 07:02]  SCr 2.02 [10-08 @ 06:17]  SCr 2.27 [10-07 @ 01:41]              Urinalysis - [09-19-20 @ 04:15]      Color Light Yellow / Appearance Clear / SG 1.016 / pH 6.0      Gluc Negative / Ketone Negative  / Bili Negative / Urobili <2 mg/dL       Blood Negative / Protein Negative / Leuk Est Negative / Nitrite Negative      RBC  / WBC  / Hyaline  / Gran  / Sq Epi  / Non Sq Epi  / Bacteria     Urine Protein 6      [10-06-20 @ 05:58]    PTH -- (Ca 10.5)      [10-06-20 @ 06:17]   183  Vitamin D (25OH) 79.0      [10-06-20 @ 06:17]  HbA1c 5.9      [11-11-17 @ 14:00]  TSH 1.92      [09-19-20 @ 10:52]    Immunofixation Serum:   No Monoclonal Band Identified    Reference Range: None Detected      [10-06-20 @ 06:18]  SPEP Interpretation: Polyclonal Gammopathy      [10-06-20 @ 06:18]

## 2020-10-11 NOTE — PROGRESS NOTE ADULT - ASSESSMENT
Ms. Alcantara is a 70 year old woman with HFpEF in the context of valvular heart disease, likely rheumatic, complicated by pulmonary hypertension, morbid obesity (BMI 41), HTN (diagnosed in 30's and reportedly well controlled with medications), severe MR s/p mechanical MVR 1999 ([facility] with Dr. Anderson) and severe TR s/p TVR 2012 ([facility]), ?COPD (home 02; no prior tobacco use), AFib on Coumadin (s/p ablation), CKD 3 (b/l SCr ~1.3-1.6), MIGUEL and gout. followed by Dr. Duarte (cardiologist) who presented to [facility] with SOB and chest tightness transferred on 8/18 for possible mitral intervention for possible mitral stenosis with Dr. Fernandez. TTE performed with normal LV function with limited views to assess mitral valve however gradients were not significantly. Suspect she has a component of acute on chronic HFpEF as well as OHS/MIGUEL.    She was diuresed ~20 lbs and developed pre-renal MISAEL with low estimated RA pressure on TTE for which R/LHC performed as below. There is significant respiratory variation of her pressures but I suspect her intravascular volume status is low. She is not interested in implantation of Cardiomems due to concern that she would not be able to manage the device at home. Will continue to hold diuretics and monitor renal function which is slowly improving.     Recent studies  TTE 10/4:   images very limited, LV non-dilated, visual LVEF ~50%, dilated RV with at least moderate dysfunction, small/collapsing IVC, LVOT VTI 13 cm, mean gradient 8 mmHg across MV    R/LHC/MV fluoroscopy 10/6 (waveforms visualized):   RA ranges from 5->20 between inspiration/expiration, no dip/plateau waveforms seen   RV 51/12 without square root sign  PA 50/17 (29)  PCWP ranges from 5->25 between inspiration/expiration  LVEDP 16  CO/CI 7.6/3.6  MV opening well with calculated MVA 2.4 cm2

## 2020-10-11 NOTE — PROGRESS NOTE ADULT - ASSESSMENT
70 year old woman with chf/ pulmonary hypertension, morbid obesity (BMI 41), HTN severe MR s/p mechanical MVR 1999 ([facility] with Dr. Anderson) and severe TR s/p TVR 2012 ([facility]), , AFib on Coumadin (s/p ablation), CKD 3 (b/l SCr ~1.3-1.6), MIGUEL and gout presented to [facility] with SOB and chest tightness transferred. required diuretics for chf, now with IMSAEL and hypercalcemia

## 2020-10-11 NOTE — PROGRESS NOTE ADULT - SUBJECTIVE AND OBJECTIVE BOX
SUBJECTIVE / OVERNIGHT EVENTS: pt denies chest pain, shortness of breath says she feels much better than before    MEDICATIONS  (STANDING):  albuterol/ipratropium for Nebulization 3 milliLiter(s) Nebulizer every 6 hours  allopurinol 100 milliGRAM(s) Oral daily  atorvastatin 40 milliGRAM(s) Oral at bedtime  heparin  Infusion. 1000 Unit(s)/Hr (10 mL/Hr) IV Continuous <Continuous>  lidocaine   Patch 1 Patch Transdermal every 24 hours  metoprolol tartrate 25 milliGRAM(s) Oral two times a day  pantoprazole    Tablet 40 milliGRAM(s) Oral before breakfast  polyethylene glycol 3350 17 Gram(s) Oral daily  senna 2 Tablet(s) Oral at bedtime    MEDICATIONS  (PRN):  acetaminophen   Tablet .. 650 milliGRAM(s) Oral every 6 hours PRN Temp greater or equal to 38.5C (101.3F), Mild Pain (1 - 3)  heparin   Injectable 9000 Unit(s) IV Push every 6 hours PRN For aPTT less than 40  heparin   Injectable 4000 Unit(s) IV Push every 6 hours PRN For aPTT between 40 - 57    Vital Signs Last 24 Hrs  T(C): 36.8 (11 Oct 2020 20:09), Max: 37.1 (11 Oct 2020 11:39)  T(F): 98.2 (11 Oct 2020 20:09), Max: 98.7 (11 Oct 2020 11:39)  HR: 58 (11 Oct 2020 20:09) (58 - 69)  BP: 113/67 (11 Oct 2020 20:09) (112/64 - 119/63)  BP(mean): --  RR: 19 (11 Oct 2020 20:09) (18 - 19)  SpO2: 100% (11 Oct 2020 20:09) (99% - 100%)    ENT: No congestion, ear pain, or sore throat.  Cardiovascular: No chest pain or palpation.  Respiratory: No cough, shortness of breath, congestion, or wheezing.  Gastrointestinal: No abdominal pain, nausea, vomiting, or diarrhea.  Genitourinary: No dysuria.  Musculoskeletal: No joint swelling.  Neurologic: No headache.    PHYSICAL EXAM:  GENERAL: NAD  EYES: EOMI, PERRLA  NECK: Supple, No JVD  CHEST/LUNG: crackles at bases  HEART:  S1 , S2 +  ABDOMEN: soft , bs+  EXTREMITIES:  edema+    LABS:  10-11    137  |  92<L>  |  94<H>  ----------------------------<  100<H>  5.0   |  33<H>  |  1.91<H>    Ca    10.5      11 Oct 2020 06:32      Creatinine Trend: 1.91 <--, 1.79 <--, 2.07 <--, 2.02 <--, 2.27 <--, 2.35 <--, 2.25 <--                        7.9    6.73  )-----------( 311      ( 11 Oct 2020 06:32 )             28.4     Urine Studies:              PT/INR - ( 11 Oct 2020 08:42 )   PT: 13.5 sec;   INR: 1.15 ratio         PTT - ( 11 Oct 2020 08:42 )  PTT:81.2 sec

## 2020-10-11 NOTE — PROGRESS NOTE ADULT - PROBLEM SELECTOR PLAN 1
- Continue to hold diuretics at this time  - Pts volume exam is quite difficult.   - Would repeat limited TTE to assess volume status (LAP and CVP)

## 2020-10-12 ENCOUNTER — TRANSCRIPTION ENCOUNTER (OUTPATIENT)
Age: 70
End: 2020-10-12

## 2020-10-12 LAB
ANION GAP SERPL CALC-SCNC: 10 MMOL/L — SIGNIFICANT CHANGE UP (ref 5–17)
ANION GAP SERPL CALC-SCNC: 11 MMOL/L — SIGNIFICANT CHANGE UP (ref 5–17)
APTT BLD: 67.8 SEC — HIGH (ref 27.5–35.5)
BUN SERPL-MCNC: 86 MG/DL — HIGH (ref 7–23)
BUN SERPL-MCNC: 88 MG/DL — HIGH (ref 7–23)
CALCIUM SERPL-MCNC: 10.9 MG/DL — HIGH (ref 8.4–10.5)
CALCIUM SERPL-MCNC: 11.4 MG/DL — HIGH (ref 8.4–10.5)
CHLORIDE SERPL-SCNC: 92 MMOL/L — LOW (ref 96–108)
CHLORIDE SERPL-SCNC: 94 MMOL/L — LOW (ref 96–108)
CO2 SERPL-SCNC: 31 MMOL/L — SIGNIFICANT CHANGE UP (ref 22–31)
CO2 SERPL-SCNC: 33 MMOL/L — HIGH (ref 22–31)
CREAT SERPL-MCNC: 1.78 MG/DL — HIGH (ref 0.5–1.3)
CREAT SERPL-MCNC: 1.87 MG/DL — HIGH (ref 0.5–1.3)
GLUCOSE SERPL-MCNC: 123 MG/DL — HIGH (ref 70–99)
GLUCOSE SERPL-MCNC: 142 MG/DL — HIGH (ref 70–99)
HCT VFR BLD CALC: 28.8 % — LOW (ref 34.5–45)
HGB BLD-MCNC: 8.1 G/DL — LOW (ref 11.5–15.5)
INR BLD: 1.19 RATIO — HIGH (ref 0.88–1.16)
MCHC RBC-ENTMCNC: 26.3 PG — LOW (ref 27–34)
MCHC RBC-ENTMCNC: 28.1 GM/DL — LOW (ref 32–36)
MCV RBC AUTO: 93.5 FL — SIGNIFICANT CHANGE UP (ref 80–100)
NRBC # BLD: 0 /100 WBCS — SIGNIFICANT CHANGE UP (ref 0–0)
PLATELET # BLD AUTO: 316 K/UL — SIGNIFICANT CHANGE UP (ref 150–400)
POTASSIUM SERPL-MCNC: 5.2 MMOL/L — SIGNIFICANT CHANGE UP (ref 3.5–5.3)
POTASSIUM SERPL-MCNC: 5.5 MMOL/L — HIGH (ref 3.5–5.3)
POTASSIUM SERPL-SCNC: 5.2 MMOL/L — SIGNIFICANT CHANGE UP (ref 3.5–5.3)
POTASSIUM SERPL-SCNC: 5.5 MMOL/L — HIGH (ref 3.5–5.3)
PROTHROM AB SERPL-ACNC: 13.9 SEC — HIGH (ref 10.6–13.6)
RBC # BLD: 3.08 M/UL — LOW (ref 3.8–5.2)
RBC # FLD: 17.7 % — HIGH (ref 10.3–14.5)
SODIUM SERPL-SCNC: 135 MMOL/L — SIGNIFICANT CHANGE UP (ref 135–145)
SODIUM SERPL-SCNC: 136 MMOL/L — SIGNIFICANT CHANGE UP (ref 135–145)
WBC # BLD: 7.62 K/UL — SIGNIFICANT CHANGE UP (ref 3.8–10.5)
WBC # FLD AUTO: 7.62 K/UL — SIGNIFICANT CHANGE UP (ref 3.8–10.5)

## 2020-10-12 PROCEDURE — 99233 SBSQ HOSP IP/OBS HIGH 50: CPT | Mod: GC

## 2020-10-12 PROCEDURE — 99232 SBSQ HOSP IP/OBS MODERATE 35: CPT

## 2020-10-12 RX ORDER — WARFARIN SODIUM 2.5 MG/1
7.5 TABLET ORAL ONCE
Refills: 0 | Status: COMPLETED | OUTPATIENT
Start: 2020-10-12 | End: 2020-10-12

## 2020-10-12 RX ADMIN — Medication 100 MILLIGRAM(S): at 11:21

## 2020-10-12 RX ADMIN — Medication 3 MILLILITER(S): at 05:35

## 2020-10-12 RX ADMIN — Medication 3 MILLILITER(S): at 17:46

## 2020-10-12 RX ADMIN — Medication 25 MILLIGRAM(S): at 17:46

## 2020-10-12 RX ADMIN — WARFARIN SODIUM 7.5 MILLIGRAM(S): 2.5 TABLET ORAL at 21:16

## 2020-10-12 RX ADMIN — HEPARIN SODIUM 1000 UNIT(S)/HR: 5000 INJECTION INTRAVENOUS; SUBCUTANEOUS at 10:20

## 2020-10-12 RX ADMIN — Medication 25 MILLIGRAM(S): at 05:35

## 2020-10-12 RX ADMIN — ATORVASTATIN CALCIUM 40 MILLIGRAM(S): 80 TABLET, FILM COATED ORAL at 21:16

## 2020-10-12 RX ADMIN — PANTOPRAZOLE SODIUM 40 MILLIGRAM(S): 20 TABLET, DELAYED RELEASE ORAL at 05:35

## 2020-10-12 RX ADMIN — Medication 3 MILLILITER(S): at 11:21

## 2020-10-12 NOTE — PROGRESS NOTE ADULT - SUBJECTIVE AND OBJECTIVE BOX
Rl Garcia  [phone]  All Cardiology service information can be found 24/7 on [email or web address], password: [email or web address]    Patient seen and examined at bedside.    Overnight Events: No events overnight. Pt feels well this morning. Still with dyspnea although improved. Denies any chest pain or palpitations.     CONSTITUTIONAL:  No fevers or chills  HEENT: No rhinorrhea   SKIN:  No rash or itching.  CARDIOVASCULAR:  No chest pain, chest pressure or chest discomfort.  RESPIRATORY:  +SOB  GASTROINTESTINAL:  No nausea, vomiting or diarrhea. No abdominal pain.   GENITOURINARY:  Denies hematuria, dysuria.   NEUROLOGICAL:  No headache, dizziness, syncope.   MUSCULOSKELETAL:  No muscle, back pain, joint pain or stiffness.  HEMATOLOGIC:  No bleeding or bruising.    Current Meds:  acetaminophen   Tablet .. 650 milliGRAM(s) Oral every 6 hours PRN  albuterol/ipratropium for Nebulization 3 milliLiter(s) Nebulizer every 6 hours  allopurinol 100 milliGRAM(s) Oral daily  atorvastatin 40 milliGRAM(s) Oral at bedtime  heparin   Injectable 9000 Unit(s) IV Push every 6 hours PRN  heparin   Injectable 4000 Unit(s) IV Push every 6 hours PRN  heparin  Infusion. 1000 Unit(s)/Hr IV Continuous <Continuous>  lidocaine   Patch 1 Patch Transdermal every 24 hours  metoprolol tartrate 25 milliGRAM(s) Oral two times a day  pantoprazole    Tablet 40 milliGRAM(s) Oral before breakfast  polyethylene glycol 3350 17 Gram(s) Oral daily  senna 2 Tablet(s) Oral at bedtime      Vitals:  T(F): 97.8 (10-12), Max: 98.7 (10-11)  HR: 63 (10-12) (58 - 65)  BP: 124/70 (10-12) (113/67 - 124/70)  RR: 19 (10-12)  SpO2: 100% (10-12)  I&O's Summary    11 Oct 2020 07:01  -  12 Oct 2020 07:00  --------------------------------------------------------  IN: 610 mL / OUT: 1900 mL / NET: -1290 mL    12 Oct 2020 07:01  -  12 Oct 2020 10:28  --------------------------------------------------------  IN: 0 mL / OUT: 150 mL / NET: -150 mL        Physical Exam:  Appearance: No Acute Distress  HEENT: JVP non elevated  Cardiovascular: RRR, mechanical S1, 1/6 EMILY throughout   Respiratory: Clear to auscultation bilaterally  Gastrointestinal: Soft, Non-tender, non-distended	  Skin: no skin lesions  Neurologic: Non-focal  Extremities: No LE edema, warm and well perfused  Psychiatry: Mood & affect appropriate                            8.1    7.62  )-----------( 316      ( 12 Oct 2020 07:10 )             28.8     10-12    135  |  92<L>  |  88<H>  ----------------------------<  123<H>  5.5<H>   |  33<H>  |  1.87<H>    Ca    11.4<H>      12 Oct 2020 07:10      PT/INR - ( 12 Oct 2020 08:37 )   PT: 13.9 sec;   INR: 1.19 ratio         PTT - ( 12 Oct 2020 08:37 )  PTT:67.8 sec    Interpretation of Telemetry:    SR 50-90s

## 2020-10-12 NOTE — PROGRESS NOTE ADULT - ATTENDING COMMENTS
Received oral dose of bumex and renal function slightly worse again. Will obtain non-invasive hemodynamics on repeat TTE and determine need for standing diuretics. Respiratory symptoms are unchanged.
Awaiting TTE to assess valve function and non-invasive assessment of volume. Given no overt volume expansion will hold diuretics for now with rising creatinine pending TTE. Keep HR 60-70 in setting of mitral stenosis.
Challenging situation as her VILLA is likely multifactorial. At the moment, though, there doesn't appear to be a persistent cardiac source for her dyspnea. Her hemodynamics are optimized with a normal prosthetic MV. Renal function is improving off diuretics. Today she feels better than yesterday even without the diuretic. She does have PASP 50s with a TPG ~ 13, but with a high CO this gives a low PVR calculation.     Would consider obesity, COPD, and/or restrictive lung disease as possible contributors to her current level of dyspnea.  There does not appear to be a cardiac origin for her symptoms at this time.  We will sign off for now, but please schedule her for follow-up with HF NP ([phone]) 1 week after discharge and Dr. Chava Foster thereafter.  Please call us back if additional questions.

## 2020-10-12 NOTE — PROGRESS NOTE ADULT - PROBLEM SELECTOR PLAN 6
CTA negative for PE  appreciate pulm c/s for possible MIGUEL and bipap
CTA negative for PE  appreciate pulm c/s for possible MIGUEL and bipap
- Appreciate pulm c/s for possible MIGUEL and bipap
- SCr above baseline of 1.3-1.6  - continue to monitor
- uptrending SCr above baseline of 1.3-1.6  - continue to monitor
CTA negative for PE  appreciate pulm c/s for possible MIGUEL and bipap

## 2020-10-12 NOTE — PROGRESS NOTE ADULT - PROBLEM SELECTOR PROBLEM 6
Shortness of breath
Shortness of breath
Chronic kidney disease (CKD), stage III (moderate)
Shortness of breath

## 2020-10-12 NOTE — PROGRESS NOTE ADULT - SUBJECTIVE AND OBJECTIVE BOX
SUBJECTIVE / OVERNIGHT EVENTS: pt denies chest pain, shortness of breath says she feels much better than before    MEDICATIONS  (STANDING):  albuterol/ipratropium for Nebulization 3 milliLiter(s) Nebulizer every 6 hours  allopurinol 100 milliGRAM(s) Oral daily  atorvastatin 40 milliGRAM(s) Oral at bedtime  heparin  Infusion. 1000 Unit(s)/Hr (10 mL/Hr) IV Continuous <Continuous>  lidocaine   Patch 1 Patch Transdermal every 24 hours  metoprolol tartrate 25 milliGRAM(s) Oral two times a day  pantoprazole    Tablet 40 milliGRAM(s) Oral before breakfast  polyethylene glycol 3350 17 Gram(s) Oral daily  senna 2 Tablet(s) Oral at bedtime    MEDICATIONS  (PRN):  acetaminophen   Tablet .. 650 milliGRAM(s) Oral every 6 hours PRN Temp greater or equal to 38.5C (101.3F), Mild Pain (1 - 3)  heparin   Injectable 9000 Unit(s) IV Push every 6 hours PRN For aPTT less than 40  heparin   Injectable 4000 Unit(s) IV Push every 6 hours PRN For aPTT between 40 - 57    Vital Signs Last 24 Hrs  T(C): 36.6 (12 Oct 2020 20:44), Max: 36.6 (12 Oct 2020 04:18)  T(F): 97.8 (12 Oct 2020 20:44), Max: 97.9 (12 Oct 2020 11:43)  HR: 65 (12 Oct 2020 20:44) (63 - 67)  BP: 131/77 (12 Oct 2020 20:44) (117/68 - 134/84)  BP(mean): --  RR: 18 (12 Oct 2020 20:44) (18 - 20)  SpO2: 97% (12 Oct 2020 20:44) (96% - 100%)    ENT: No congestion, ear pain, or sore throat.  Cardiovascular: No chest pain or palpation.  Respiratory: No cough, shortness of breath, congestion, or wheezing.  Gastrointestinal: No abdominal pain, nausea, vomiting, or diarrhea.  Genitourinary: No dysuria.  Musculoskeletal: No joint swelling.  Neurologic: No headache.    PHYSICAL EXAM:  GENERAL: NAD  EYES: EOMI, PERRLA  NECK: Supple, No JVD  CHEST/LUNG: crackles at bases  HEART:  S1 , S2 +  ABDOMEN: soft , bs+  EXTREMITIES:  edema+    LABS:  10-12    136  |  94<L>  |  86<H>  ----------------------------<  142<H>  5.2   |  31  |  1.78<H>    Ca    10.9<H>      12 Oct 2020 10:16      Creatinine Trend: 1.78 <--, 1.87 <--, 1.91 <--, 1.79 <--, 2.07 <--, 2.02 <--, 2.27 <--, 2.35 <--                        8.1    7.62  )-----------( 316      ( 12 Oct 2020 07:10 )             28.8     Urine Studies:              PT/INR - ( 12 Oct 2020 08:37 )   PT: 13.9 sec;   INR: 1.19 ratio         PTT - ( 12 Oct 2020 08:37 )  PTT:67.8 sec

## 2020-10-12 NOTE — PROGRESS NOTE ADULT - ASSESSMENT
Ms. Alcantara is a 70 year old woman with HFpEF in the context of valvular heart disease, likely rheumatic, complicated by pulmonary hypertension, morbid obesity (BMI 41), HTN (diagnosed in 30's and reportedly well controlled with medications), severe MR s/p mechanical MVR 1999 ([facility] with Dr. Anderson) and severe TR s/p TVR 2012 ([facility]), ?COPD (home 02; no prior tobacco use), AFib on Coumadin (s/p ablation), CKD 3 (b/l SCr ~1.3-1.6), MIGUEL and gout. followed by Dr. Duarte (cardiologist) who presented to [facility] with SOB and chest tightness transferred on 8/18 for possible mitral intervention for possible mitral stenosis with Dr. Fernandez. TTE performed with normal LV function with limited views to assess mitral valve however gradients were not significantly. Suspect she has a component of acute on chronic HFpEF as well as OHS/MIGUEL.    She was diuresed ~20 lbs and developed pre-renal MISAEL with low estimated RA pressure on TTE for which R/LHC performed as below. She is not interested in implantation of Cardiomems due to concern that she would not be able to manage the device at home. Will continue to hold diuretics and monitor renal function which is slowly improving.     Recent studies  TTE 10/4:   images very limited, LV non-dilated, visual LVEF ~50%, dilated RV with at least moderate dysfunction, small/collapsing IVC, LVOT VTI 13 cm, mean gradient 8 mmHg across MV    R/LHC/MV fluoroscopy 10/6 (waveforms visualized):   RA ranges from 5->20 between inspiration/expiration, no dip/plateau waveforms seen   RV 51/12 without square root sign  PA 50/17 (29)  PCWP ranges from 5->25 between inspiration/expiration  LVEDP 16  CO/CI 7.6/3.6  MV opening well with calculated MVA 2.4 cm2

## 2020-10-12 NOTE — DISCHARGE NOTE NURSING/CASE MANAGEMENT/SOCIAL WORK - PATIENT PORTAL LINK FT
You can access the FollowMyHealth Patient Portal offered by [facility] by registering at the following website: [email or web address]. By joining [facility]’s FollowMyHealth portal, you will also be able to view your health information using other applications (apps) compatible with our system.

## 2020-10-12 NOTE — PROGRESS NOTE ADULT - ASSESSMENT
70 year old woman with chf/ pulmonary hypertension, morbid obesity (BMI 41), HTN severe MR s/p mechanical MVR 1999 ([facility] with Dr. Anderson) and severe TR s/p TVR 2012 ([facility]), , AFib on Coumadin (s/p ablation), CKD 3 (b/l SCr ~1.3-1.6), MIGUEL and gout presented to [facility] with SOB and chest tightness transferred. required diuretics for chf. dx with mitral valve disease. required diuretics  now cr noticed to be elevated to 2.2 range.     1- MISAEL on ckd III   2- hypercalcemia   3- chf   4- anemia     misael in setting of diuretics use likely. diuretics cont to hold today. cr is slightly better  bun is improving now   primary hyperparathyroid in setting of hypercalcemia and high intact pth suspected ? sensipar addition    anemia trend hb   cont nebs   d/w cards

## 2020-10-12 NOTE — PROGRESS NOTE ADULT - PROBLEM SELECTOR PLAN 1
- Continue to hold diuretics at this time  - Pts volume exam is quite difficult, no overt signs of volume overload   - f/u repeat limited TTE to assess volume status (LAP and CVP) [Headaches] : no headaches [Constipation] : no constipation [Fatigue] : no fatigue [Weakness] : no weakness [Abdominal Pain] : no abdominal pain [FreeTextEntry2] : Jared is a 14 year 6 month old male who was referred by his pediatrician for evaluation of abnormal thyroid studies.  Over the past year, Jared has been feeling dizzy and light headed intermittently.  The episodes do not occur frequently and usually happen after standing up quickly. When he was younger, Jared had 3 seizure like episodes (described by school), but work up (including EEGs) never yielded any abnormal findings.  At that time, Jared was skipping breakfast and not drinking/eating a lot, and mother believes that it was never actually true seizure activity, since the episodes stopped after eating and drinking improve. Mother still feels that Jared does not drink enough throughout the day, though he is very active with sports. Due to his symptoms, Jared saw his pediatrician and blood work was performed on 6/3/17 that included thyroid studies: TSH 5.52 uIU/mL (flagged as elevated), total T4 8.4 ug/dL, free T4 by equilibrium dialysis 2.0 ng/dL. Jared was then referred to my office for evaluation. Of note, strong maternal history of autoimmune disorders.  - Continue to hold diuretics at this time. Her dyspnea is unlikely to be due to volume overload. Recommend pulm f/u.   - HF will sign off, pt can follow-up with Dr. Foster upon discharge

## 2020-10-12 NOTE — PROGRESS NOTE ADULT - SUBJECTIVE AND OBJECTIVE BOX
PULMONARY PROGRESS NOTE    DAMARI GUERRERO  MRN-[id number]    Patient is a 70y old  Female who presents with a chief complaint of mitral regurgitation, heart failure (12 Oct 2020 10:27)      HPI:  nasal bleeding-   tolerated NIVV overnight     ROS:   -    ACTIVE MEDICATION LIST:  MEDICATIONS  (STANDING):  albuterol/ipratropium for Nebulization 3 milliLiter(s) Nebulizer every 6 hours  allopurinol 100 milliGRAM(s) Oral daily  atorvastatin 40 milliGRAM(s) Oral at bedtime  heparin  Infusion. 1000 Unit(s)/Hr (10 mL/Hr) IV Continuous <Continuous>  lidocaine   Patch 1 Patch Transdermal every 24 hours  metoprolol tartrate 25 milliGRAM(s) Oral two times a day  pantoprazole    Tablet 40 milliGRAM(s) Oral before breakfast  polyethylene glycol 3350 17 Gram(s) Oral daily  senna 2 Tablet(s) Oral at bedtime    MEDICATIONS  (PRN):  acetaminophen   Tablet .. 650 milliGRAM(s) Oral every 6 hours PRN Temp greater or equal to 38.5C (101.3F), Mild Pain (1 - 3)  heparin   Injectable 9000 Unit(s) IV Push every 6 hours PRN For aPTT less than 40  heparin   Injectable 4000 Unit(s) IV Push every 6 hours PRN For aPTT between 40 - 57      EXAM:  Vital Signs Last 24 Hrs  T(C): 36.6 (12 Oct 2020 11:43), Max: 36.8 (11 Oct 2020 20:09)  T(F): 97.9 (12 Oct 2020 11:43), Max: 98.2 (11 Oct 2020 20:09)  HR: 63 (12 Oct 2020 11:43) (58 - 65)  BP: 117/68 (12 Oct 2020 11:43) (113/67 - 124/70)  BP(mean): --  RR: 20 (12 Oct 2020 11:43) (19 - 20)  SpO2: 96% (12 Oct 2020 11:43) (96% - 100%)    GENERAL: The patient is awake and alert in no apparent distress.     LUNGS: Clear to auscultation without wheezing, rales or rhonchi; respirations unlabored    HEART: Regular rate                              8.1    7.62  )-----------( 316      ( 12 Oct 2020 07:10 )             28.8       10-12    136  |  94<L>  |  86<H>  ----------------------------<  142<H>  5.2   |  31  |  1.78<H>    Ca    10.9<H>      12 Oct 2020 10:16     rad< from: Xray Chest 1 View- PORTABLE-Urgent (Xray Chest 1 View- PORTABLE-Urgent .) (09.18.20 @ 21:07) >    EXAM:  XR CHEST PORTABLE URGENT 1V                            PROCEDURE DATE:  09/18/2020            INTERPRETATION:  CLINICAL HISTORY: CHF.    TECHNIQUE: Single upright AP chest radiograph.    COMPARISON: Comparison with previous from 6/20/2020.    COMMENT:  Sternotomy wires are noted.    There is mild pulmonary vascular congestion.  There is no focal airspace consolidation.  There are no pleural effusions.    The mediastinal contour is markedly enlarged.  The trachea is midline.    The osseous structures are intact.    IMPRESSION:  Marked cardiomegaly with mild pulmonary vascular congestion likely representing mild fluid overload                  QUANG CAVANAUGH M.D., ATTENDING RADIOLOGIST  This document has been electronically signed. Sep19 2020 11:07AM    < end of copied text >      PROBLEM LIST:  70y Female with HEALTH ISSUES - PROBLEM Dx:  Hypercalcemia  Hypercalcemia    MISAEL (acute kidney injury)  MISAEL (acute kidney injury)    Shortness of breath  Shortness of breath    Chronic kidney disease (CKD), stage III (moderate)  Chronic kidney disease (CKD), stage III (moderate)    Tricuspid valve replaced  Tricuspid valve replaced    Mitral valve replaced  Mitral valve replaced    Atrial fibrillation, unspecified type  Atrial fibrillation, unspecified type    Essential hypertension  Essential hypertension    Acute on chronic heart failure with preserved ejection fraction  Acute on chronic heart failure with preserved ejection fraction    Mitral valve stenosis, unspecified etiology  Mitral valve stenosis, unspecified etiology    CHF (congestive heart failure)  CHF (congestive heart failure)    Gout  Gout    MIGUEL (obstructive sleep apnea)  MIGUEL (obstructive sleep apnea)    Atrial fibrillation  Atrial fibrillation           RECS:  continue NIVV  continue 02  saline irrigation for nasal bleeding- likely due to 02  continue with 02 humidification  ENT if worsens  diuresis per cardio      Please call with any questions.    Irma Eaton DO  [facility] Pulmonary/Sleep Medicine  [phone]

## 2020-10-12 NOTE — PROGRESS NOTE ADULT - SUBJECTIVE AND OBJECTIVE BOX
[facility]   [phone]  RENAL FOLLOW UP NOTE  --------------------------------------------------------------------------------  Chief Complaint:    24 hour events/subjective:    seen earlier.   c/o increasing sob.       PAST HISTORY  --------------------------------------------------------------------------------  No significant changes to PMH, PSH, FHx, SHx, unless otherwise noted    ALLERGIES & MEDICATIONS  --------------------------------------------------------------------------------  Allergies    No Known Allergies    Intolerances      Standing Inpatient Medications  albuterol/ipratropium for Nebulization 3 milliLiter(s) Nebulizer every 6 hours  allopurinol 100 milliGRAM(s) Oral daily  atorvastatin 40 milliGRAM(s) Oral at bedtime  heparin  Infusion. 1000 Unit(s)/Hr IV Continuous <Continuous>  lidocaine   Patch 1 Patch Transdermal every 24 hours  metoprolol tartrate 25 milliGRAM(s) Oral two times a day  pantoprazole    Tablet 40 milliGRAM(s) Oral before breakfast  polyethylene glycol 3350 17 Gram(s) Oral daily  senna 2 Tablet(s) Oral at bedtime    PRN Inpatient Medications  acetaminophen   Tablet .. 650 milliGRAM(s) Oral every 6 hours PRN  heparin   Injectable 9000 Unit(s) IV Push every 6 hours PRN  heparin   Injectable 4000 Unit(s) IV Push every 6 hours PRN      REVIEW OF SYSTEMS  --------------------------------------------------------------------------------    Gen: denies  fevers/chills,  CVS: denies chest pain/palpitations  Resp: +  SOB/Cough -   GI: Denies N/V/Abd pain  : Denies dysuria states is urinating     All other systems were reviewed and are negative, except as noted.    VITALS/PHYSICAL EXAM  --------------------------------------------------------------------------------  T(C): 36.6 (10-12-20 @ 20:44), Max: 36.6 (10-12-20 @ 04:18)  HR: 65 (10-12-20 @ 20:44) (62 - 67)  BP: 131/77 (10-12-20 @ 20:44) (117/68 - 134/84)  RR: 18 (10-12-20 @ 20:44) (18 - 20)  SpO2: 97% (10-12-20 @ 20:44) (96% - 100%)  Wt(kg): --        10-11-20 @ 07:01  -  10-12-20 @ 07:00  --------------------------------------------------------  IN: 610 mL / OUT: 1900 mL / NET: -1290 mL    10-12-20 @ 07:01  -  10-12-20 @ 21:47  --------------------------------------------------------  IN: 1220 mL / OUT: 850 mL / NET: 370 mL      Physical Exam:  	   JVD none   	Pulm: decrease bs  no rales or ronchi or wheezing  	CV: RRR, S1S2; no rub  	Abd: +BS, soft, nontender/nondistended  	: No suprapubic tenderness  	UE: Warm, no cyanosis  no clubbing,  no edema  	LE: Warm, no cyanosis  no clubbing, non pitting no  edema  	Neuro: alert and oriented. speech coherent         LABS/STUDIES  --------------------------------------------------------------------------------              8.1    7.62  >-----------<  316      [10-12-20 @ 07:10]              28.8     136  |  94  |  86  ----------------------------<  142      [10-12-20 @ 10:16]  5.2   |  31  |  1.78        Ca     10.9     [10-12-20 @ 10:16]      PT/INR: PT 13.9 , INR 1.19       [10-12-20 @ 08:37]  PTT: 67.8       [10-12-20 @ 08:37]      Creatinine Trend:  SCr 1.78 [10-12 @ 10:16]  SCr 1.87 [10-12 @ 07:10]  SCr 1.91 [10-11 @ 06:32]  SCr 1.79 [10-10 @ 08:41]  SCr 2.07 [10-09 @ 07:02]              Urinalysis - [09-19-20 @ 04:15]      Color Light Yellow / Appearance Clear / SG 1.016 / pH 6.0      Gluc Negative / Ketone Negative  / Bili Negative / Urobili <2 mg/dL       Blood Negative / Protein Negative / Leuk Est Negative / Nitrite Negative      RBC  / WBC  / Hyaline  / Gran  / Sq Epi  / Non Sq Epi  / Bacteria     Urine Protein 6      [10-06-20 @ 05:58]    PTH -- (Ca 10.5)      [10-06-20 @ 06:17]   183  Vitamin D (25OH) 79.0      [10-06-20 @ 06:17]  HbA1c 5.9      [11-11-17 @ 14:00]  TSH 1.92      [09-19-20 @ 10:52]    Immunofixation Serum:   No Monoclonal Band Identified    Reference Range: None Detected      [10-06-20 @ 06:18]  SPEP Interpretation: Polyclonal Gammopathy      [10-06-20 @ 06:18]

## 2020-10-13 LAB
ANION GAP SERPL CALC-SCNC: 10 MMOL/L — SIGNIFICANT CHANGE UP (ref 5–17)
APTT BLD: 43.8 SEC — HIGH (ref 27.5–35.5)
APTT BLD: >200 SEC (ref 27.5–35.5)
BUN SERPL-MCNC: 80 MG/DL — HIGH (ref 7–23)
CALCIUM SERPL-MCNC: 10.7 MG/DL — HIGH (ref 8.4–10.5)
CHLORIDE SERPL-SCNC: 93 MMOL/L — LOW (ref 96–108)
CO2 SERPL-SCNC: 31 MMOL/L — SIGNIFICANT CHANGE UP (ref 22–31)
CREAT SERPL-MCNC: 1.66 MG/DL — HIGH (ref 0.5–1.3)
GLUCOSE SERPL-MCNC: 161 MG/DL — HIGH (ref 70–99)
HCT VFR BLD CALC: 27.8 % — LOW (ref 34.5–45)
HGB BLD-MCNC: 8.1 G/DL — LOW (ref 11.5–15.5)
INR BLD: 1.32 RATIO — HIGH (ref 0.88–1.16)
MCHC RBC-ENTMCNC: 26.8 PG — LOW (ref 27–34)
MCHC RBC-ENTMCNC: 29.1 GM/DL — LOW (ref 32–36)
MCV RBC AUTO: 92.1 FL — SIGNIFICANT CHANGE UP (ref 80–100)
NRBC # BLD: 0 /100 WBCS — SIGNIFICANT CHANGE UP (ref 0–0)
PLATELET # BLD AUTO: 287 K/UL — SIGNIFICANT CHANGE UP (ref 150–400)
POTASSIUM SERPL-MCNC: 5.7 MMOL/L — HIGH (ref 3.5–5.3)
POTASSIUM SERPL-SCNC: 5.7 MMOL/L — HIGH (ref 3.5–5.3)
PROTHROM AB SERPL-ACNC: 15.4 SEC — HIGH (ref 10.6–13.6)
RBC # BLD: 3.02 M/UL — LOW (ref 3.8–5.2)
RBC # FLD: 17.5 % — HIGH (ref 10.3–14.5)
SODIUM SERPL-SCNC: 134 MMOL/L — LOW (ref 135–145)
WBC # BLD: 7.04 K/UL — SIGNIFICANT CHANGE UP (ref 3.8–10.5)
WBC # FLD AUTO: 7.04 K/UL — SIGNIFICANT CHANGE UP (ref 3.8–10.5)

## 2020-10-13 RX ORDER — SODIUM ZIRCONIUM CYCLOSILICATE 10 G/10G
10 POWDER, FOR SUSPENSION ORAL ONCE
Refills: 0 | Status: COMPLETED | OUTPATIENT
Start: 2020-10-13 | End: 2020-10-14

## 2020-10-13 RX ORDER — WARFARIN SODIUM 2.5 MG/1
7.5 TABLET ORAL ONCE
Refills: 0 | Status: COMPLETED | OUTPATIENT
Start: 2020-10-13 | End: 2020-10-13

## 2020-10-13 RX ADMIN — PANTOPRAZOLE SODIUM 40 MILLIGRAM(S): 20 TABLET, DELAYED RELEASE ORAL at 05:15

## 2020-10-13 RX ADMIN — SENNA PLUS 2 TABLET(S): 8.6 TABLET ORAL at 21:48

## 2020-10-13 RX ADMIN — HEPARIN SODIUM 900 UNIT(S)/HR: 5000 INJECTION INTRAVENOUS; SUBCUTANEOUS at 21:48

## 2020-10-13 RX ADMIN — ATORVASTATIN CALCIUM 40 MILLIGRAM(S): 80 TABLET, FILM COATED ORAL at 21:48

## 2020-10-13 RX ADMIN — Medication 3 MILLILITER(S): at 00:07

## 2020-10-13 RX ADMIN — Medication 25 MILLIGRAM(S): at 17:22

## 2020-10-13 RX ADMIN — WARFARIN SODIUM 7.5 MILLIGRAM(S): 2.5 TABLET ORAL at 21:48

## 2020-10-13 RX ADMIN — HEPARIN SODIUM 4000 UNIT(S): 5000 INJECTION INTRAVENOUS; SUBCUTANEOUS at 21:54

## 2020-10-13 RX ADMIN — HEPARIN SODIUM 0 UNIT(S)/HR: 5000 INJECTION INTRAVENOUS; SUBCUTANEOUS at 12:10

## 2020-10-13 RX ADMIN — Medication 3 MILLILITER(S): at 17:22

## 2020-10-13 RX ADMIN — HEPARIN SODIUM 700 UNIT(S)/HR: 5000 INJECTION INTRAVENOUS; SUBCUTANEOUS at 13:41

## 2020-10-13 RX ADMIN — Medication 3 MILLILITER(S): at 05:15

## 2020-10-13 RX ADMIN — Medication 3 MILLILITER(S): at 11:33

## 2020-10-13 RX ADMIN — Medication 100 MILLIGRAM(S): at 11:33

## 2020-10-13 RX ADMIN — Medication 25 MILLIGRAM(S): at 05:15

## 2020-10-13 NOTE — PROVIDER CONTACT NOTE (OTHER) - ASSESSMENT
Pt remains A&O x 4, VSS, denies CP/SOB/palpitation/dizziness. Hx. PAT during this admission. Currently on Metoprolol 25mg PO BID.

## 2020-10-13 NOTE — PROGRESS NOTE ADULT - SUBJECTIVE AND OBJECTIVE BOX
SUBJECTIVE / OVERNIGHT EVENTS: pt denies chest pain, shortness of breath says she feels much better than before    MEDICATIONS  (STANDING):  albuterol/ipratropium for Nebulization 3 milliLiter(s) Nebulizer every 6 hours  allopurinol 100 milliGRAM(s) Oral daily  atorvastatin 40 milliGRAM(s) Oral at bedtime  heparin  Infusion. 1000 Unit(s)/Hr (10 mL/Hr) IV Continuous <Continuous>  lidocaine   Patch 1 Patch Transdermal every 24 hours  metoprolol tartrate 25 milliGRAM(s) Oral two times a day  pantoprazole    Tablet 40 milliGRAM(s) Oral before breakfast  polyethylene glycol 3350 17 Gram(s) Oral daily  senna 2 Tablet(s) Oral at bedtime  sodium zirconium cyclosilicate 10 Gram(s) Oral once    MEDICATIONS  (PRN):  acetaminophen   Tablet .. 650 milliGRAM(s) Oral every 6 hours PRN Temp greater or equal to 38.5C (101.3F), Mild Pain (1 - 3)  heparin   Injectable 9000 Unit(s) IV Push every 6 hours PRN For aPTT less than 40  heparin   Injectable 4000 Unit(s) IV Push every 6 hours PRN For aPTT between 40 - 57    Vital Signs Last 24 Hrs  T(C): 37 (13 Oct 2020 20:34), Max: 37 (13 Oct 2020 20:34)  T(F): 98.6 (13 Oct 2020 20:34), Max: 98.6 (13 Oct 2020 20:34)  HR: 63 (13 Oct 2020 22:13) (57 - 74)  BP: 115/68 (13 Oct 2020 20:34) (106/64 - 135/75)  BP(mean): --  RR: 18 (13 Oct 2020 20:34) (18 - 18)  SpO2: 97% (13 Oct 2020 22:13) (96% - 99%)    ENT: No congestion, ear pain, or sore throat.  Cardiovascular: No chest pain or palpation.  Respiratory: No cough, shortness of breath, congestion, or wheezing.  Gastrointestinal: No abdominal pain, nausea, vomiting, or diarrhea.  Genitourinary: No dysuria.  Musculoskeletal: No joint swelling.  Neurologic: No headache.    PHYSICAL EXAM:  GENERAL: NAD  EYES: EOMI, PERRLA  NECK: Supple, No JVD  CHEST/LUNG: crackles at bases  HEART:  S1 , S2 +  ABDOMEN: soft , bs+  EXTREMITIES:  edema+    LABS:  10-13    134<L>  |  93<L>  |  80<H>  ----------------------------<  161<H>  5.7<H>   |  31  |  1.66<H>    Ca    10.7<H>      13 Oct 2020 06:06      Creatinine Trend: 1.66 <--, 1.78 <--, 1.87 <--, 1.91 <--, 1.79 <--, 2.07 <--, 2.02 <--, 2.27 <--                        8.1    7.04  )-----------( 287      ( 13 Oct 2020 06:06 )             27.8     Urine Studies:              PT/INR - ( 13 Oct 2020 09:13 )   PT: 15.4 sec;   INR: 1.32 ratio         PTT - ( 13 Oct 2020 21:01 )  PTT:43.8 sec

## 2020-10-13 NOTE — PROVIDER CONTACT NOTE (CRITICAL VALUE NOTIFICATION) - BACKGROUND
Pt admitted with dx. MV stenosis, A on C CHF. Currently on Heparin gtt at 10ml/hr for hx. AF & MVR with Hep-Coumadin bridge.

## 2020-10-14 LAB
ANION GAP SERPL CALC-SCNC: 10 MMOL/L — SIGNIFICANT CHANGE UP (ref 5–17)
APTT BLD: 85.4 SEC — HIGH (ref 27.5–35.5)
APTT BLD: 91.8 SEC — HIGH (ref 27.5–35.5)
BASOPHILS # BLD AUTO: 0.03 K/UL — SIGNIFICANT CHANGE UP (ref 0–0.2)
BASOPHILS NFR BLD AUTO: 0.4 % — SIGNIFICANT CHANGE UP (ref 0–2)
BUN SERPL-MCNC: 79 MG/DL — HIGH (ref 7–23)
CALCIUM SERPL-MCNC: 11.2 MG/DL — HIGH (ref 8.4–10.5)
CHLORIDE SERPL-SCNC: 96 MMOL/L — SIGNIFICANT CHANGE UP (ref 96–108)
CO2 SERPL-SCNC: 32 MMOL/L — HIGH (ref 22–31)
CREAT SERPL-MCNC: 1.65 MG/DL — HIGH (ref 0.5–1.3)
EOSINOPHIL # BLD AUTO: 0.27 K/UL — SIGNIFICANT CHANGE UP (ref 0–0.5)
EOSINOPHIL NFR BLD AUTO: 3.8 % — SIGNIFICANT CHANGE UP (ref 0–6)
GLUCOSE SERPL-MCNC: 123 MG/DL — HIGH (ref 70–99)
HCT VFR BLD CALC: 29 % — LOW (ref 34.5–45)
HGB BLD-MCNC: 8.3 G/DL — LOW (ref 11.5–15.5)
IMM GRANULOCYTES NFR BLD AUTO: 0.7 % — SIGNIFICANT CHANGE UP (ref 0–1.5)
INR BLD: 1.26 RATIO — HIGH (ref 0.88–1.16)
LYMPHOCYTES # BLD AUTO: 1.68 K/UL — SIGNIFICANT CHANGE UP (ref 1–3.3)
LYMPHOCYTES # BLD AUTO: 23.7 % — SIGNIFICANT CHANGE UP (ref 13–44)
MCHC RBC-ENTMCNC: 26.2 PG — LOW (ref 27–34)
MCHC RBC-ENTMCNC: 28.6 GM/DL — LOW (ref 32–36)
MCV RBC AUTO: 91.5 FL — SIGNIFICANT CHANGE UP (ref 80–100)
MONOCYTES # BLD AUTO: 0.91 K/UL — HIGH (ref 0–0.9)
MONOCYTES NFR BLD AUTO: 12.9 % — SIGNIFICANT CHANGE UP (ref 2–14)
NEUTROPHILS # BLD AUTO: 4.14 K/UL — SIGNIFICANT CHANGE UP (ref 1.8–7.4)
NEUTROPHILS NFR BLD AUTO: 58.5 % — SIGNIFICANT CHANGE UP (ref 43–77)
NRBC # BLD: 0 /100 WBCS — SIGNIFICANT CHANGE UP (ref 0–0)
PLATELET # BLD AUTO: 284 K/UL — SIGNIFICANT CHANGE UP (ref 150–400)
POTASSIUM SERPL-MCNC: 5.4 MMOL/L — HIGH (ref 3.5–5.3)
POTASSIUM SERPL-SCNC: 5.4 MMOL/L — HIGH (ref 3.5–5.3)
PROTHROM AB SERPL-ACNC: 14.9 SEC — HIGH (ref 10.6–13.6)
RBC # BLD: 3.17 M/UL — LOW (ref 3.8–5.2)
RBC # FLD: 17.3 % — HIGH (ref 10.3–14.5)
SODIUM SERPL-SCNC: 138 MMOL/L — SIGNIFICANT CHANGE UP (ref 135–145)
WBC # BLD: 7.08 K/UL — SIGNIFICANT CHANGE UP (ref 3.8–10.5)
WBC # FLD AUTO: 7.08 K/UL — SIGNIFICANT CHANGE UP (ref 3.8–10.5)

## 2020-10-14 RX ORDER — WARFARIN SODIUM 2.5 MG/1
8 TABLET ORAL ONCE
Refills: 0 | Status: COMPLETED | OUTPATIENT
Start: 2020-10-14 | End: 2020-10-14

## 2020-10-14 RX ADMIN — HEPARIN SODIUM 900 UNIT(S)/HR: 5000 INJECTION INTRAVENOUS; SUBCUTANEOUS at 06:25

## 2020-10-14 RX ADMIN — Medication 3 MILLILITER(S): at 05:36

## 2020-10-14 RX ADMIN — Medication 3 MILLILITER(S): at 07:01

## 2020-10-14 RX ADMIN — ATORVASTATIN CALCIUM 40 MILLIGRAM(S): 80 TABLET, FILM COATED ORAL at 21:04

## 2020-10-14 RX ADMIN — WARFARIN SODIUM 8 MILLIGRAM(S): 2.5 TABLET ORAL at 21:04

## 2020-10-14 RX ADMIN — Medication 100 MILLIGRAM(S): at 11:40

## 2020-10-14 RX ADMIN — Medication 25 MILLIGRAM(S): at 05:37

## 2020-10-14 RX ADMIN — SODIUM ZIRCONIUM CYCLOSILICATE 10 GRAM(S): 10 POWDER, FOR SUSPENSION ORAL at 07:57

## 2020-10-14 RX ADMIN — SENNA PLUS 2 TABLET(S): 8.6 TABLET ORAL at 15:07

## 2020-10-14 RX ADMIN — Medication 25 MILLIGRAM(S): at 18:06

## 2020-10-14 RX ADMIN — Medication 3 MILLILITER(S): at 18:06

## 2020-10-14 RX ADMIN — HEPARIN SODIUM 900 UNIT(S)/HR: 5000 INJECTION INTRAVENOUS; SUBCUTANEOUS at 13:21

## 2020-10-14 RX ADMIN — Medication 3 MILLILITER(S): at 11:40

## 2020-10-14 RX ADMIN — PANTOPRAZOLE SODIUM 40 MILLIGRAM(S): 20 TABLET, DELAYED RELEASE ORAL at 05:37

## 2020-10-14 NOTE — PROGRESS NOTE ADULT - SUBJECTIVE AND OBJECTIVE BOX
[facility]   [phone]  RENAL FOLLOW UP NOTE  --------------------------------------------------------------------------------  Chief Complaint:    24 hour events/subjective:    states breathing is better today   seen earlier today     PAST HISTORY  --------------------------------------------------------------------------------  No significant changes to PMH, PSH, FHx, SHx, unless otherwise noted    ALLERGIES & MEDICATIONS  --------------------------------------------------------------------------------  Allergies    No Known Allergies    Intolerances      Standing Inpatient Medications  albuterol/ipratropium for Nebulization 3 milliLiter(s) Nebulizer every 6 hours  allopurinol 100 milliGRAM(s) Oral daily  atorvastatin 40 milliGRAM(s) Oral at bedtime  heparin  Infusion. 1000 Unit(s)/Hr IV Continuous <Continuous>  lidocaine   Patch 1 Patch Transdermal every 24 hours  metoprolol tartrate 25 milliGRAM(s) Oral two times a day  pantoprazole    Tablet 40 milliGRAM(s) Oral before breakfast  polyethylene glycol 3350 17 Gram(s) Oral daily  senna 2 Tablet(s) Oral at bedtime  warfarin 8 milliGRAM(s) Oral once    PRN Inpatient Medications  acetaminophen   Tablet .. 650 milliGRAM(s) Oral every 6 hours PRN  heparin   Injectable 9000 Unit(s) IV Push every 6 hours PRN  heparin   Injectable 4000 Unit(s) IV Push every 6 hours PRN      REVIEW OF SYSTEMS  --------------------------------------------------------------------------------    Gen: denies  fevers/chills,  CVS: denies chest pain/palpitations  Resp: denies SOB/Cough  GI: Denies N/V/Abd pain  : Denies dysuria or decrease urination     All other systems were reviewed and are negative, except as noted.    VITALS/PHYSICAL EXAM  --------------------------------------------------------------------------------  T(C): 36.6 (10-14-20 @ 11:09), Max: 37 (10-13-20 @ 20:34)  HR: 73 (10-14-20 @ 18:30) (63 - 73)  BP: 115/66 (10-14-20 @ 18:05) (115/66 - 119/71)  RR: 18 (10-14-20 @ 11:09) (18 - 18)  SpO2: 100% (10-14-20 @ 18:30) (95% - 100%)  Wt(kg): --        10-13-20 @ 07:01  -  10-14-20 @ 07:00  --------------------------------------------------------  IN: 600 mL / OUT: 1450 mL / NET: -850 mL    10-14-20 @ 07:01  -  10-14-20 @ 20:10  --------------------------------------------------------  IN: 600 mL / OUT: 250 mL / NET: 350 mL      Physical Exam:  	     JVD none   	Pulm: decrease bs  no rales or ronchi or wheezing  	CV: RRR, S1S2; no rub  	Abd: +BS, soft, nontender/nondistended  	: No suprapubic tenderness  	UE: Warm, no cyanosis  no clubbing,  no edema  	LE: Warm, no cyanosis  no clubbing, non pitting no  edema  	Neuro: alert and oriented. speech coherent       LABS/STUDIES  --------------------------------------------------------------------------------              8.3    7.08  >-----------<  284      [10-14-20 @ 05:34]              29.0     138  |  96  |  79  ----------------------------<  123      [10-14-20 @ 05:34]  5.4   |  32  |  1.65        Ca     11.2     [10-14-20 @ 05:34]      PT/INR: PT 14.9 , INR 1.26       [10-14-20 @ 05:34]  PTT: 91.8       [10-14-20 @ 12:22]      Creatinine Trend:  SCr 1.65 [10-14 @ 05:34]  SCr 1.66 [10-13 @ 06:06]  SCr 1.78 [10-12 @ 10:16]  SCr 1.87 [10-12 @ 07:10]  SCr 1.91 [10-11 @ 06:32]              Urinalysis - [09-19-20 @ 04:15]      Color Light Yellow / Appearance Clear / SG 1.016 / pH 6.0      Gluc Negative / Ketone Negative  / Bili Negative / Urobili <2 mg/dL       Blood Negative / Protein Negative / Leuk Est Negative / Nitrite Negative      RBC  / WBC  / Hyaline  / Gran  / Sq Epi  / Non Sq Epi  / Bacteria       PTH -- (Ca 10.5)      [10-06-20 @ 06:17]   183  Vitamin D (25OH) 79.0      [10-06-20 @ 06:17]  HbA1c 5.9      [11-11-17 @ 14:00]  TSH 1.92      [09-19-20 @ 10:52]    Immunofixation Serum:   No Monoclonal Band Identified    Reference Range: None Detected      [10-06-20 @ 06:18]  SPEP Interpretation: Polyclonal Gammopathy      [10-06-20 @ 06:18]

## 2020-10-14 NOTE — PROGRESS NOTE ADULT - ASSESSMENT
70 year old woman with chf/ pulmonary hypertension, morbid obesity (BMI 41), HTN severe MR s/p mechanical MVR 1999 ([facility] with Dr. Anderson) and severe TR s/p TVR 2012 ([facility]), , AFib on Coumadin (s/p ablation), CKD 3 (b/l SCr ~1.3-1.6), MIGUEL and gout presented to [facility] with SOB and chest tightness transferred. required diuretics for chf. dx with mitral valve disease. required diuretics  now cr noticed to be elevated to 2.2 range.     1- MISAEL on ckd III   2- hypercalcemia   3- chf   4- anemia     misael in setting of diuretics use likely. diuretics cont to hold today. cr is slightly better  bun is improving now   primary hyperparathyroid in setting of hypercalcemia and high intact pth suspected ? sensipar addition. endo consult  cr is improving    anemia trend hb   cont nebs    70 year old woman with chf/ pulmonary hypertension, morbid obesity (BMI 41), HTN severe MR s/p mechanical MVR 1999 ([facility] with Dr. Anderson) and severe TR s/p TVR 2012 ([facility]), , AFib on Coumadin (s/p ablation), CKD 3 (b/l SCr ~1.3-1.6), MIGUEL and gout presented to [facility] with SOB and chest tightness transferred. required diuretics for chf. dx with mitral valve disease. required diuretics  now cr noticed to be elevated to 2.2 range.     1- MISAEL on ckd III   2- hypercalcemia   3- chf   4- anemia     misael in setting of diuretics use likely. diuretics cont to hold today. cr is slightly better  bun is improving now   primary hyperparathyroid in setting of hypercalcemia and high intact pth suspected ? sensipar addition. endo consult  cr is improving    anemia trend hb   cont nebs       addendum:   hyperkalemia to receive lokelma 5 g po

## 2020-10-14 NOTE — CHART NOTE - NSCHARTNOTEFT_GEN_A_CORE
Nutrition Follow Up Note  Patient seen for: nutrition follow up    Chart reviewed, events noted.    Source: pt, electronic medical record     Diet: Soft, Consistent Carbohydrate (no evening snacks). Of note, ensure supplements discontinued today 10/14.    Patient reports: some mild nausea, RN aware, reports last BM was this AM.    PO intake: noted with ~100% po intake at meals, however pt reports only fair po intake and still wants to receive a oral nutrition supplement if able. Will add 1 Nepro for pt to trial. Discussed high potassium labs and indication to limit potassium in diet at this time- pt likes mashed potatoes and seems resistant to change. Pt reports no other nutrition related questions at this time. She voices familiarity with coumadin and vitamin K drug-nutrient interaction.  Pt states she is tolerating soft diet texture okay, she does not want beef as it is still too difficult for her to chew. Does not want further diet texture downgrade.    Source for PO intake: pt, electronic medical record     Daily Weight in lbs: 240 (10-13); fluid shifts noted  240.9 (10-12)  236.7 (10-07, standing, +1 B/L leg edema noted).   239.2 (admit wt 9-18)    Pertinent Medications: MEDICATIONS  (STANDING):  albuterol/ipratropium for Nebulization 3 milliLiter(s) Nebulizer every 6 hours  allopurinol 100 milliGRAM(s) Oral daily  atorvastatin 40 milliGRAM(s) Oral at bedtime  heparin  Infusion. 1000 Unit(s)/Hr (10 mL/Hr) IV Continuous <Continuous>  lidocaine   Patch 1 Patch Transdermal every 24 hours  metoprolol tartrate 25 milliGRAM(s) Oral two times a day  pantoprazole    Tablet 40 milliGRAM(s) Oral before breakfast  polyethylene glycol 3350 17 Gram(s) Oral daily  senna 2 Tablet(s) Oral at bedtime    MEDICATIONS  (PRN):  acetaminophen   Tablet .. 650 milliGRAM(s) Oral every 6 hours PRN Temp greater or equal to 38.5C (101.3F), Mild Pain (1 - 3)  heparin   Injectable 9000 Unit(s) IV Push every 6 hours PRN For aPTT less than 40  heparin   Injectable 4000 Unit(s) IV Push every 6 hours PRN For aPTT between 40 - 57    Pertinent Labs: 10-14 @ 05:34: Na 138, BUN 79<H>, Cr 1.65<H>, <H>, K+ 5.4<H>    Skin per nursing documentation: no pressure injuries noted  Edema: 1+ edema to b/l legs, feet, ankles last noted 10/13    Estimated Needs:   [x] no change since previous assessment  [ ] recalculated:     Previous Nutrition Diagnosis: Overweight/Obesity (BMI>40)  Nutrition Diagnosis is: ongoing, addressed with nutrition education at time of initial RD assessment, calorie controlled diet in-patient. Pt declines need for additional nutrition education at this time.    New Nutrition Diagnosis: n/a    Recommend  1) Add No Concentrated Potassium and Low Sodium to current Consistent Carbohydrate (no evening snacks) therapeutic diet as indicated. Continue to monitor renal indices and lytes.  2) Provide 1 Nepro daily.  3) Provide/review nutrition education as indicated.    Monitoring and Evaluation:     Continue to monitor Nutritional intake, Tolerance to diet prescription, weights, labs, skin integrity    RD remains available upon request and will follow up per protocol. Joy Moore RD, CDN Pager: [phone]

## 2020-10-14 NOTE — PROGRESS NOTE ADULT - SUBJECTIVE AND OBJECTIVE BOX
SUBJECTIVE / OVERNIGHT EVENTS: pt denies chest pain, shortness of breath says she feels much better than before    MEDICATIONS  (STANDING):  albuterol/ipratropium for Nebulization 3 milliLiter(s) Nebulizer every 6 hours  allopurinol 100 milliGRAM(s) Oral daily  atorvastatin 40 milliGRAM(s) Oral at bedtime  heparin  Infusion. 1000 Unit(s)/Hr (10 mL/Hr) IV Continuous <Continuous>  lidocaine   Patch 1 Patch Transdermal every 24 hours  metoprolol tartrate 25 milliGRAM(s) Oral two times a day  pantoprazole    Tablet 40 milliGRAM(s) Oral before breakfast  polyethylene glycol 3350 17 Gram(s) Oral daily  senna 2 Tablet(s) Oral at bedtime    MEDICATIONS  (PRN):  acetaminophen   Tablet .. 650 milliGRAM(s) Oral every 6 hours PRN Temp greater or equal to 38.5C (101.3F), Mild Pain (1 - 3)  heparin   Injectable 9000 Unit(s) IV Push every 6 hours PRN For aPTT less than 40  heparin   Injectable 4000 Unit(s) IV Push every 6 hours PRN For aPTT between 40 - 57    Vital Signs Last 24 Hrs  T(C): 36.8 (14 Oct 2020 20:15), Max: 36.8 (14 Oct 2020 20:15)  T(F): 98.2 (14 Oct 2020 20:15), Max: 98.2 (14 Oct 2020 20:15)  HR: 71 (14 Oct 2020 22:55) (63 - 73)  BP: 145/75 (14 Oct 2020 20:15) (115/66 - 145/75)  BP(mean): --  RR: 18 (14 Oct 2020 20:15) (18 - 18)  SpO2: 98% (14 Oct 2020 22:55) (95% - 100%)    ENT: No congestion, ear pain, or sore throat.  Cardiovascular: No chest pain or palpation.  Respiratory: No cough, shortness of breath, congestion, or wheezing.  Gastrointestinal: No abdominal pain, nausea, vomiting, or diarrhea.  Genitourinary: No dysuria.  Musculoskeletal: No joint swelling.  Neurologic: No headache.    PHYSICAL EXAM:  GENERAL: NAD  EYES: EOMI, PERRLA  NECK: Supple, No JVD  CHEST/LUNG: crackles at bases  HEART:  S1 , S2 +  ABDOMEN: soft , bs+  EXTREMITIES:  edema+    LABS:  10-14    138  |  96  |  79<H>  ----------------------------<  123<H>  5.4<H>   |  32<H>  |  1.65<H>    Ca    11.2<H>      14 Oct 2020 05:34      Creatinine Trend: 1.65 <--, 1.66 <--, 1.78 <--, 1.87 <--, 1.91 <--, 1.79 <--, 2.07 <--, 2.02 <--                        8.3    7.08  )-----------( 284      ( 14 Oct 2020 05:34 )             29.0     Urine Studies:              PT/INR - ( 14 Oct 2020 05:34 )   PT: 14.9 sec;   INR: 1.26 ratio         PTT - ( 14 Oct 2020 12:22 )  PTT:91.8 sec

## 2020-10-15 LAB
ANION GAP SERPL CALC-SCNC: 9 MMOL/L — SIGNIFICANT CHANGE UP (ref 5–17)
APTT BLD: 64.6 SEC — HIGH (ref 27.5–35.5)
BUN SERPL-MCNC: 73 MG/DL — HIGH (ref 7–23)
CALCIUM SERPL-MCNC: 10.7 MG/DL — HIGH (ref 8.4–10.5)
CHLORIDE SERPL-SCNC: 96 MMOL/L — SIGNIFICANT CHANGE UP (ref 96–108)
CO2 SERPL-SCNC: 31 MMOL/L — SIGNIFICANT CHANGE UP (ref 22–31)
CREAT SERPL-MCNC: 1.58 MG/DL — HIGH (ref 0.5–1.3)
GLUCOSE SERPL-MCNC: 104 MG/DL — HIGH (ref 70–99)
HCT VFR BLD CALC: 27.5 % — LOW (ref 34.5–45)
HGB BLD-MCNC: 8 G/DL — LOW (ref 11.5–15.5)
INR BLD: 1.33 RATIO — HIGH (ref 0.88–1.16)
MCHC RBC-ENTMCNC: 27.2 PG — SIGNIFICANT CHANGE UP (ref 27–34)
MCHC RBC-ENTMCNC: 29.1 GM/DL — LOW (ref 32–36)
MCV RBC AUTO: 93.5 FL — SIGNIFICANT CHANGE UP (ref 80–100)
NRBC # BLD: 0 /100 WBCS — SIGNIFICANT CHANGE UP (ref 0–0)
PLATELET # BLD AUTO: 328 K/UL — SIGNIFICANT CHANGE UP (ref 150–400)
POTASSIUM SERPL-MCNC: 5.1 MMOL/L — SIGNIFICANT CHANGE UP (ref 3.5–5.3)
POTASSIUM SERPL-SCNC: 5.1 MMOL/L — SIGNIFICANT CHANGE UP (ref 3.5–5.3)
PROTHROM AB SERPL-ACNC: 15.6 SEC — HIGH (ref 10.6–13.6)
PTH RELATED PROT SERPL-MCNC: <2 PMOL/L — SIGNIFICANT CHANGE UP
RBC # BLD: 2.94 M/UL — LOW (ref 3.8–5.2)
RBC # FLD: 17.5 % — HIGH (ref 10.3–14.5)
SODIUM SERPL-SCNC: 136 MMOL/L — SIGNIFICANT CHANGE UP (ref 135–145)
WBC # BLD: 6.97 K/UL — SIGNIFICANT CHANGE UP (ref 3.8–10.5)
WBC # FLD AUTO: 6.97 K/UL — SIGNIFICANT CHANGE UP (ref 3.8–10.5)

## 2020-10-15 RX ORDER — WARFARIN SODIUM 2.5 MG/1
10 TABLET ORAL ONCE
Refills: 0 | Status: COMPLETED | OUTPATIENT
Start: 2020-10-15 | End: 2020-10-15

## 2020-10-15 RX ADMIN — PANTOPRAZOLE SODIUM 40 MILLIGRAM(S): 20 TABLET, DELAYED RELEASE ORAL at 05:02

## 2020-10-15 RX ADMIN — ATORVASTATIN CALCIUM 40 MILLIGRAM(S): 80 TABLET, FILM COATED ORAL at 21:58

## 2020-10-15 RX ADMIN — WARFARIN SODIUM 10 MILLIGRAM(S): 2.5 TABLET ORAL at 21:58

## 2020-10-15 RX ADMIN — Medication 100 MILLIGRAM(S): at 11:48

## 2020-10-15 RX ADMIN — Medication 3 MILLILITER(S): at 11:48

## 2020-10-15 RX ADMIN — Medication 25 MILLIGRAM(S): at 05:02

## 2020-10-15 RX ADMIN — HEPARIN SODIUM 900 UNIT(S)/HR: 5000 INJECTION INTRAVENOUS; SUBCUTANEOUS at 09:36

## 2020-10-15 RX ADMIN — Medication 25 MILLIGRAM(S): at 17:58

## 2020-10-15 RX ADMIN — Medication 3 MILLILITER(S): at 17:58

## 2020-10-15 NOTE — PROGRESS NOTE ADULT - ASSESSMENT
70 year old woman with chf/ pulmonary hypertension, morbid obesity (BMI 41), HTN severe MR s/p mechanical MVR 1999 ([facility] with Dr. Anderson) and severe TR s/p TVR 2012 ([facility]), , AFib on Coumadin (s/p ablation), CKD 3 (b/l SCr ~1.3-1.6), MIGUEL and gout presented to [facility] with SOB and chest tightness transferred. required diuretics for chf. dx with mitral valve disease. required diuretics  now cr noticed to be elevated to 2.2 range.     1- MISAEL on ckd III   2- hypercalcemia   3- chf   4- anemia     misael in setting of diuretics use likely. diuretics cont to hold  for now   primary hyperparathyroid in setting of hypercalcemia and high intact pth suspected ? sensipar addition. endo consult  cr is improving    anemia trend hb   cont nebs

## 2020-10-15 NOTE — PROGRESS NOTE ADULT - SUBJECTIVE AND OBJECTIVE BOX
[facility]   [phone]  RENAL FOLLOW UP NOTE  --------------------------------------------------------------------------------  Chief Complaint:    24 hour events/subjective:    seen earlier. states feels better and is not sob     PAST HISTORY  --------------------------------------------------------------------------------  No significant changes to PMH, PSH, FHx, SHx, unless otherwise noted    ALLERGIES & MEDICATIONS  --------------------------------------------------------------------------------  Allergies    No Known Allergies    Intolerances      Standing Inpatient Medications  albuterol/ipratropium for Nebulization 3 milliLiter(s) Nebulizer every 6 hours  allopurinol 100 milliGRAM(s) Oral daily  atorvastatin 40 milliGRAM(s) Oral at bedtime  heparin  Infusion. 1000 Unit(s)/Hr IV Continuous <Continuous>  lidocaine   Patch 1 Patch Transdermal every 24 hours  metoprolol tartrate 25 milliGRAM(s) Oral two times a day  pantoprazole    Tablet 40 milliGRAM(s) Oral before breakfast  polyethylene glycol 3350 17 Gram(s) Oral daily  senna 2 Tablet(s) Oral at bedtime  warfarin 10 milliGRAM(s) Oral once    PRN Inpatient Medications  acetaminophen   Tablet .. 650 milliGRAM(s) Oral every 6 hours PRN  heparin   Injectable 9000 Unit(s) IV Push every 6 hours PRN  heparin   Injectable 4000 Unit(s) IV Push every 6 hours PRN      REVIEW OF SYSTEMS  --------------------------------------------------------------------------------    Gen: denies fevers/chills,  CVS: denies chest pain/palpitations  Resp: denies SOB/Cough  GI: Denies N/V/Abd pain  : Denies dysuria    All other systems were reviewed and are negative, except as noted.    VITALS/PHYSICAL EXAM  --------------------------------------------------------------------------------  T(C): 36.6 (10-15-20 @ 11:22), Max: 36.8 (10-14-20 @ 20:15)  HR: 73 (10-15-20 @ 17:55) (62 - 85)  BP: 128/71 (10-15-20 @ 17:55) (122/62 - 149/77)  RR: 18 (10-15-20 @ 17:55) (18 - 18)  SpO2: 98% (10-15-20 @ 17:55) (96% - 98%)  Wt(kg): --        10-14-20 @ 07:01  -  10-15-20 @ 07:00  --------------------------------------------------------  IN: 600 mL / OUT: 600 mL / NET: 0 mL    10-15-20 @ 07:01  -  10-15-20 @ 19:26  --------------------------------------------------------  IN: 552 mL / OUT: 300 mL / NET: 252 mL      Physical Exam:  	   JVD none   	Pulm: decrease bs  no rales or ronchi or wheezing  	CV: RRR, S1S2; no rub  	Abd: +BS, soft, nontender/nondistended  	: No suprapubic tenderness  	UE: Warm, no cyanosis  no clubbing,  no edema  	LE: Warm, no cyanosis  no clubbing, non pitting no  edema  	Neuro: alert and oriented. speech coherent       LABS/STUDIES  --------------------------------------------------------------------------------              8.0    6.97  >-----------<  328      [10-15-20 @ 06:40]              27.5     136  |  96  |  73  ----------------------------<  104      [10-15-20 @ 06:40]  5.1   |  31  |  1.58        Ca     10.7     [10-15-20 @ 06:40]      PT/INR: PT 15.6 , INR 1.33       [10-15-20 @ 08:31]  PTT: 64.6       [10-15-20 @ 08:31]      Creatinine Trend:  SCr 1.58 [10-15 @ 06:40]  SCr 1.65 [10-14 @ 05:34]  SCr 1.66 [10-13 @ 06:06]  SCr 1.78 [10-12 @ 10:16]  SCr 1.87 [10-12 @ 07:10]              Urinalysis - [09-19-20 @ 04:15]      Color Light Yellow / Appearance Clear / SG 1.016 / pH 6.0      Gluc Negative / Ketone Negative  / Bili Negative / Urobili <2 mg/dL       Blood Negative / Protein Negative / Leuk Est Negative / Nitrite Negative      RBC  / WBC  / Hyaline  / Gran  / Sq Epi  / Non Sq Epi  / Bacteria       PTH -- (Ca 10.5)      [10-06-20 @ 06:17]   183  Vitamin D (25OH) 79.0      [10-06-20 @ 06:17]  HbA1c 5.9      [11-11-17 @ 14:00]  TSH 1.92      [09-19-20 @ 10:52]    Immunofixation Serum:   No Monoclonal Band Identified    Reference Range: None Detected      [10-06-20 @ 06:18]  SPEP Interpretation: Polyclonal Gammopathy      [10-06-20 @ 06:18]

## 2020-10-15 NOTE — PROGRESS NOTE ADULT - SUBJECTIVE AND OBJECTIVE BOX
SUBJECTIVE / OVERNIGHT EVENTS: pt denies chest pain, shortness of breath says she feels much better than before    MEDICATIONS  (STANDING):  albuterol/ipratropium for Nebulization 3 milliLiter(s) Nebulizer every 6 hours  allopurinol 100 milliGRAM(s) Oral daily  atorvastatin 40 milliGRAM(s) Oral at bedtime  heparin  Infusion. 1000 Unit(s)/Hr (10 mL/Hr) IV Continuous <Continuous>  lidocaine   Patch 1 Patch Transdermal every 24 hours  metoprolol tartrate 25 milliGRAM(s) Oral two times a day  pantoprazole    Tablet 40 milliGRAM(s) Oral before breakfast  polyethylene glycol 3350 17 Gram(s) Oral daily  senna 2 Tablet(s) Oral at bedtime    MEDICATIONS  (PRN):  acetaminophen   Tablet .. 650 milliGRAM(s) Oral every 6 hours PRN Temp greater or equal to 38.5C (101.3F), Mild Pain (1 - 3)  heparin   Injectable 9000 Unit(s) IV Push every 6 hours PRN For aPTT less than 40  heparin   Injectable 4000 Unit(s) IV Push every 6 hours PRN For aPTT between 40 - 57    Vital Signs Last 24 Hrs  T(C): 36.8 (15 Oct 2020 20:35), Max: 36.8 (15 Oct 2020 05:15)  T(F): 98.2 (15 Oct 2020 20:35), Max: 98.3 (15 Oct 2020 05:15)  HR: 68 (15 Oct 2020 20:35) (62 - 85)  BP: 122/67 (15 Oct 2020 20:35) (122/62 - 149/77)  BP(mean): --  RR: 18 (15 Oct 2020 20:35) (18 - 18)  SpO2: 94% (15 Oct 2020 20:35) (94% - 98%)    ENT: No congestion, ear pain, or sore throat.  Cardiovascular: No chest pain or palpation.  Respiratory: No cough, shortness of breath, congestion, or wheezing.  Gastrointestinal: No abdominal pain, nausea, vomiting, or diarrhea.  Genitourinary: No dysuria.  Musculoskeletal: No joint swelling.  Neurologic: No headache.    PHYSICAL EXAM:  GENERAL: NAD  EYES: EOMI, PERRLA  NECK: Supple, No JVD  CHEST/LUNG: crackles at bases  HEART:  S1 , S2 +  ABDOMEN: soft , bs+  EXTREMITIES:  edema+    LABS:  10-15    136  |  96  |  73<H>  ----------------------------<  104<H>  5.1   |  31  |  1.58<H>    Ca    10.7<H>      15 Oct 2020 06:40      Creatinine Trend: 1.58 <--, 1.65 <--, 1.66 <--, 1.78 <--, 1.87 <--, 1.91 <--, 1.79 <--, 2.07 <--                        8.0    6.97  )-----------( 328      ( 15 Oct 2020 06:40 )             27.5     Urine Studies:              PT/INR - ( 15 Oct 2020 08:31 )   PT: 15.6 sec;   INR: 1.33 ratio         PTT - ( 15 Oct 2020 08:31 )  PTT:64.6 sec

## 2020-10-16 LAB
ANION GAP SERPL CALC-SCNC: 10 MMOL/L — SIGNIFICANT CHANGE UP (ref 5–17)
APTT BLD: 64.5 SEC — HIGH (ref 27.5–35.5)
BUN SERPL-MCNC: 67 MG/DL — HIGH (ref 7–23)
CALCIUM SERPL-MCNC: 10.6 MG/DL — HIGH (ref 8.4–10.5)
CHLORIDE SERPL-SCNC: 97 MMOL/L — SIGNIFICANT CHANGE UP (ref 96–108)
CO2 SERPL-SCNC: 30 MMOL/L — SIGNIFICANT CHANGE UP (ref 22–31)
CREAT SERPL-MCNC: 1.53 MG/DL — HIGH (ref 0.5–1.3)
GLUCOSE SERPL-MCNC: 131 MG/DL — HIGH (ref 70–99)
HCT VFR BLD CALC: 27.7 % — LOW (ref 34.5–45)
HGB BLD-MCNC: 7.9 G/DL — LOW (ref 11.5–15.5)
INR BLD: 1.59 RATIO — HIGH (ref 0.88–1.16)
MCHC RBC-ENTMCNC: 27 PG — SIGNIFICANT CHANGE UP (ref 27–34)
MCHC RBC-ENTMCNC: 28.5 GM/DL — LOW (ref 32–36)
MCV RBC AUTO: 94.5 FL — SIGNIFICANT CHANGE UP (ref 80–100)
NRBC # BLD: 0 /100 WBCS — SIGNIFICANT CHANGE UP (ref 0–0)
PLATELET # BLD AUTO: 287 K/UL — SIGNIFICANT CHANGE UP (ref 150–400)
POTASSIUM SERPL-MCNC: 4.7 MMOL/L — SIGNIFICANT CHANGE UP (ref 3.5–5.3)
POTASSIUM SERPL-SCNC: 4.7 MMOL/L — SIGNIFICANT CHANGE UP (ref 3.5–5.3)
PROTHROM AB SERPL-ACNC: 18.4 SEC — HIGH (ref 10.6–13.6)
RBC # BLD: 2.93 M/UL — LOW (ref 3.8–5.2)
RBC # FLD: 17.3 % — HIGH (ref 10.3–14.5)
SODIUM SERPL-SCNC: 137 MMOL/L — SIGNIFICANT CHANGE UP (ref 135–145)
WBC # BLD: 6.38 K/UL — SIGNIFICANT CHANGE UP (ref 3.8–10.5)
WBC # FLD AUTO: 6.38 K/UL — SIGNIFICANT CHANGE UP (ref 3.8–10.5)

## 2020-10-16 PROCEDURE — 93010 ELECTROCARDIOGRAM REPORT: CPT

## 2020-10-16 PROCEDURE — 99232 SBSQ HOSP IP/OBS MODERATE 35: CPT

## 2020-10-16 RX ORDER — WARFARIN SODIUM 2.5 MG/1
10 TABLET ORAL ONCE
Refills: 0 | Status: COMPLETED | OUTPATIENT
Start: 2020-10-16 | End: 2020-10-16

## 2020-10-16 RX ADMIN — PANTOPRAZOLE SODIUM 40 MILLIGRAM(S): 20 TABLET, DELAYED RELEASE ORAL at 05:14

## 2020-10-16 RX ADMIN — Medication 100 MILLIGRAM(S): at 11:24

## 2020-10-16 RX ADMIN — Medication 3 MILLILITER(S): at 17:36

## 2020-10-16 RX ADMIN — Medication 3 MILLILITER(S): at 05:14

## 2020-10-16 RX ADMIN — Medication 25 MILLIGRAM(S): at 17:36

## 2020-10-16 RX ADMIN — Medication 25 MILLIGRAM(S): at 05:14

## 2020-10-16 RX ADMIN — Medication 3 MILLILITER(S): at 11:23

## 2020-10-16 RX ADMIN — WARFARIN SODIUM 10 MILLIGRAM(S): 2.5 TABLET ORAL at 21:24

## 2020-10-16 RX ADMIN — HEPARIN SODIUM 900 UNIT(S)/HR: 5000 INJECTION INTRAVENOUS; SUBCUTANEOUS at 10:13

## 2020-10-16 RX ADMIN — ATORVASTATIN CALCIUM 40 MILLIGRAM(S): 80 TABLET, FILM COATED ORAL at 21:24

## 2020-10-16 RX ADMIN — Medication 3 MILLILITER(S): at 23:47

## 2020-10-16 NOTE — PROGRESS NOTE ADULT - ASSESSMENT
70 year old woman with chf/ pulmonary hypertension, morbid obesity (BMI 41), HTN severe MR s/p mechanical MVR 1999 ([facility] with Dr. Anderson) and severe TR s/p TVR 2012 ([facility]), , AFib on Coumadin (s/p ablation), CKD 3 (b/l SCr ~1.3-1.6), MIGUEL and gout presented to [facility] with SOB and chest tightness transferred. required diuretics for chf. dx with mitral valve disease. required diuretics  now cr noticed to be elevated to 2.2 range.     1- MISAEL on ckd III   2- hypercalcemia   3- chf   4- anemia     misael in setting of diuretics use likely. diuretics cont to hold  for now   cr is improving   consider endo consult for high ca+   anemia trend hb   cont nebs

## 2020-10-16 NOTE — PROGRESS NOTE ADULT - SUBJECTIVE AND OBJECTIVE BOX
SUBJECTIVE / OVERNIGHT EVENTS: pt denies chest pain, shortness of breath says she feels much better than before    MEDICATIONS  (STANDING):  albuterol/ipratropium for Nebulization 3 milliLiter(s) Nebulizer every 6 hours  allopurinol 100 milliGRAM(s) Oral daily  atorvastatin 40 milliGRAM(s) Oral at bedtime  heparin  Infusion. 1000 Unit(s)/Hr (10 mL/Hr) IV Continuous <Continuous>  lidocaine   Patch 1 Patch Transdermal every 24 hours  metoprolol tartrate 25 milliGRAM(s) Oral two times a day  pantoprazole    Tablet 40 milliGRAM(s) Oral before breakfast  polyethylene glycol 3350 17 Gram(s) Oral daily  senna 2 Tablet(s) Oral at bedtime    MEDICATIONS  (PRN):  acetaminophen   Tablet .. 650 milliGRAM(s) Oral every 6 hours PRN Temp greater or equal to 38.5C (101.3F), Mild Pain (1 - 3)  heparin   Injectable 9000 Unit(s) IV Push every 6 hours PRN For aPTT less than 40  heparin   Injectable 4000 Unit(s) IV Push every 6 hours PRN For aPTT between 40 - 57    Vital Signs Last 24 Hrs  T(C): 36.8 (16 Oct 2020 21:10), Max: 36.8 (16 Oct 2020 21:10)  T(F): 98.2 (16 Oct 2020 21:10), Max: 98.2 (16 Oct 2020 21:10)  HR: 63 (16 Oct 2020 21:10) (60 - 110)  BP: 111/64 (16 Oct 2020 21:10) (111/64 - 125/74)  BP(mean): --  RR: 18 (16 Oct 2020 21:10) (18 - 18)  SpO2: 99% (16 Oct 2020 21:10) (96% - 100%)    ENT: No congestion, ear pain, or sore throat.  Cardiovascular: No chest pain or palpation.  Respiratory: No cough, shortness of breath, congestion, or wheezing.  Gastrointestinal: No abdominal pain, nausea, vomiting, or diarrhea.  Genitourinary: No dysuria.  Musculoskeletal: No joint swelling.  Neurologic: No headache.    PHYSICAL EXAM:  GENERAL: NAD  EYES: EOMI, PERRLA  NECK: Supple, No JVD  CHEST/LUNG: crackles at bases  HEART:  S1 , S2 +  ABDOMEN: soft , bs+  EXTREMITIES:  edema+    LABS:  10-16    137  |  97  |  67<H>  ----------------------------<  131<H>  4.7   |  30  |  1.53<H>    Ca    10.6<H>      16 Oct 2020 07:18      Creatinine Trend: 1.53 <--, 1.58 <--, 1.65 <--, 1.66 <--, 1.78 <--, 1.87 <--, 1.91 <--, 1.79 <--                        7.9    6.38  )-----------( 287      ( 16 Oct 2020 07:18 )             27.7     Urine Studies:              PT/INR - ( 16 Oct 2020 08:52 )   PT: 18.4 sec;   INR: 1.59 ratio         PTT - ( 16 Oct 2020 08:52 )  PTT:64.5 sec

## 2020-10-16 NOTE — PROGRESS NOTE ADULT - SUBJECTIVE AND OBJECTIVE BOX
[facility]   [phone]  RENAL FOLLOW UP NOTE  --------------------------------------------------------------------------------  Chief Complaint:    24 hour events/subjective:    seen earlier. states breathing is better and overall feels better     PAST HISTORY  --------------------------------------------------------------------------------  No significant changes to PMH, PSH, FHx, SHx, unless otherwise noted    ALLERGIES & MEDICATIONS  --------------------------------------------------------------------------------  Allergies    No Known Allergies    Intolerances      Standing Inpatient Medications  albuterol/ipratropium for Nebulization 3 milliLiter(s) Nebulizer every 6 hours  allopurinol 100 milliGRAM(s) Oral daily  atorvastatin 40 milliGRAM(s) Oral at bedtime  heparin  Infusion. 1000 Unit(s)/Hr IV Continuous <Continuous>  lidocaine   Patch 1 Patch Transdermal every 24 hours  metoprolol tartrate 25 milliGRAM(s) Oral two times a day  pantoprazole    Tablet 40 milliGRAM(s) Oral before breakfast  polyethylene glycol 3350 17 Gram(s) Oral daily  senna 2 Tablet(s) Oral at bedtime  warfarin 10 milliGRAM(s) Oral once    PRN Inpatient Medications  acetaminophen   Tablet .. 650 milliGRAM(s) Oral every 6 hours PRN  heparin   Injectable 9000 Unit(s) IV Push every 6 hours PRN  heparin   Injectable 4000 Unit(s) IV Push every 6 hours PRN      REVIEW OF SYSTEMS  --------------------------------------------------------------------------------    Gen: denies  fevers/chills,  CVS: denies chest pain/palpitations  Resp: denies worsening SOB/Cough  GI: Denies N/V/Abd pain  : Denies dysuria/oliguria/hematuria    All other systems were reviewed and are negative, except as noted.    VITALS/PHYSICAL EXAM  --------------------------------------------------------------------------------  T(C): 36.7 (10-16-20 @ 12:44), Max: 36.8 (10-15-20 @ 20:35)  HR: 62 (10-16-20 @ 16:51) (60 - 110)  BP: 125/74 (10-16-20 @ 12:44) (122/67 - 125/74)  RR: 18 (10-16-20 @ 12:44) (18 - 18)  SpO2: 98% (10-16-20 @ 16:51) (94% - 100%)  Wt(kg): --        10-15-20 @ 07:01  -  10-16-20 @ 07:00  --------------------------------------------------------  IN: 752 mL / OUT: 300 mL / NET: 452 mL    10-16-20 @ 07:01  -  10-16-20 @ 19:04  --------------------------------------------------------  IN: 630 mL / OUT: 500 mL / NET: 130 mL      Physical Exam:  	  alert and oriented on O2    JVD none   	Pulm: decrease bs  no rales or ronchi or wheezing  	CV: RRR, S1S2; no rub  	Abd: +BS, soft, nontender/nondistended  	: No suprapubic tenderness  	UE: Warm, no cyanosis  no clubbing,  no edema  	LE: Warm, no cyanosis  no clubbing, non pitting no  edema  	Neuro: alert and oriented. speech coherent       LABS/STUDIES  --------------------------------------------------------------------------------              7.9    6.38  >-----------<  287      [10-16-20 @ 07:18]              27.7     137  |  97  |  67  ----------------------------<  131      [10-16-20 @ 07:18]  4.7   |  30  |  1.53        Ca     10.6     [10-16-20 @ 07:18]      PT/INR: PT 18.4 , INR 1.59       [10-16-20 @ 08:52]  PTT: 64.5       [10-16-20 @ 08:52]      Creatinine Trend:  SCr 1.53 [10-16 @ 07:18]  SCr 1.58 [10-15 @ 06:40]  SCr 1.65 [10-14 @ 05:34]  SCr 1.66 [10-13 @ 06:06]  SCr 1.78 [10-12 @ 10:16]              Urinalysis - [09-19-20 @ 04:15]      Color Light Yellow / Appearance Clear / SG 1.016 / pH 6.0      Gluc Negative / Ketone Negative  / Bili Negative / Urobili <2 mg/dL       Blood Negative / Protein Negative / Leuk Est Negative / Nitrite Negative      RBC  / WBC  / Hyaline  / Gran  / Sq Epi  / Non Sq Epi  / Bacteria       PTH -- (Ca 10.5)      [10-06-20 @ 06:17]   183  Vitamin D (25OH) 79.0      [10-06-20 @ 06:17]  HbA1c 5.9      [11-11-17 @ 14:00]  TSH 1.92      [09-19-20 @ 10:52]    Immunofixation Serum:   No Monoclonal Band Identified    Reference Range: None Detected      [10-06-20 @ 06:18]  SPEP Interpretation: Polyclonal Gammopathy      [10-06-20 @ 06:18]

## 2020-10-16 NOTE — PROGRESS NOTE ADULT - SUBJECTIVE AND OBJECTIVE BOX
PULMONARY PROGRESS NOTE    DAMARI GUERRERO  MRN-[id number]    Patient is a 70y old  Female who presents with a chief complaint of mitral regurgitation, heart failure (12 Oct 2020 10:27)      HPI:  sitting up in chair  no complaints  sleeping with bipap nightly     ROS:   -    MEDICATIONS  (STANDING):  albuterol/ipratropium for Nebulization 3 milliLiter(s) Nebulizer every 6 hours  allopurinol 100 milliGRAM(s) Oral daily  atorvastatin 40 milliGRAM(s) Oral at bedtime  heparin  Infusion. 1000 Unit(s)/Hr (10 mL/Hr) IV Continuous <Continuous>  lidocaine   Patch 1 Patch Transdermal every 24 hours  metoprolol tartrate 25 milliGRAM(s) Oral two times a day  pantoprazole    Tablet 40 milliGRAM(s) Oral before breakfast  polyethylene glycol 3350 17 Gram(s) Oral daily  senna 2 Tablet(s) Oral at bedtime    MEDICATIONS  (PRN):  acetaminophen   Tablet .. 650 milliGRAM(s) Oral every 6 hours PRN Temp greater or equal to 38.5C (101.3F), Mild Pain (1 - 3)  heparin   Injectable 9000 Unit(s) IV Push every 6 hours PRN For aPTT less than 40  heparin   Injectable 4000 Unit(s) IV Push every 6 hours PRN For aPTT between 40 - 57        EXAM:  Vital Signs Last 24 Hrs  T(C): 36.4 (16 Oct 2020 05:26), Max: 36.8 (15 Oct 2020 20:35)  T(F): 97.6 (16 Oct 2020 05:26), Max: 98.2 (15 Oct 2020 20:35)  HR: 60 (16 Oct 2020 08:50) (60 - 85)  BP: 123/68 (16 Oct 2020 05:26) (122/62 - 128/71)  BP(mean): --  RR: 18 (16 Oct 2020 05:26) (18 - 18)  SpO2: 100% (16 Oct 2020 08:50) (94% - 100%)    GENERAL: The patient is awake and alert in no apparent distress.     LUNGS: Clear to auscultation without wheezing, rales or rhonchi; respirations unlabored    HEART: S1/S2                             7.9    6.38  )-----------( 287      ( 16 Oct 2020 07:18 )             27.7   10-16    137  |  97  |  67<H>  ----------------------------<  131<H>  4.7   |  30  |  1.53<H>    Ca    10.6<H>      16 Oct 2020 07:18       rad< from: Xray Chest 1 View- PORTABLE-Urgent (Xray Chest 1 View- PORTABLE-Urgent .) (09.18.20 @ 21:07) >    EXAM:  XR CHEST PORTABLE URGENT 1V                            PROCEDURE DATE:  09/18/2020            INTERPRETATION:  CLINICAL HISTORY: CHF.    TECHNIQUE: Single upright AP chest radiograph.    COMPARISON: Comparison with previous from 6/20/2020.    COMMENT:  Sternotomy wires are noted.    There is mild pulmonary vascular congestion.  There is no focal airspace consolidation.  There are no pleural effusions.    The mediastinal contour is markedly enlarged.  The trachea is midline.    The osseous structures are intact.    IMPRESSION:  Marked cardiomegaly with mild pulmonary vascular congestion likely representing mild fluid overload                  QUANG CAVANAUGH M.D., ATTENDING RADIOLOGIST  This document has been electronically signed. Sep19 2020 11:07AM    < end of copied text >      PROBLEM LIST:  70y Female with HEALTH ISSUES - PROBLEM Dx:  Hypercalcemia  Hypercalcemia    MISAEL (acute kidney injury)  MISAEL (acute kidney injury)    Shortness of breath  Shortness of breath    Chronic kidney disease (CKD), stage III (moderate)  Chronic kidney disease (CKD), stage III (moderate)    Tricuspid valve replaced  Tricuspid valve replaced    Mitral valve replaced  Mitral valve replaced    Atrial fibrillation, unspecified type  Atrial fibrillation, unspecified type    Essential hypertension  Essential hypertension    Acute on chronic heart failure with preserved ejection fraction  Acute on chronic heart failure with preserved ejection fraction    Mitral valve stenosis, unspecified etiology  Mitral valve stenosis, unspecified etiology    CHF (congestive heart failure)  CHF (congestive heart failure)    Gout  Gout    MIGUEL (obstructive sleep apnea)  MIGUEL (obstructive sleep apnea)    Atrial fibrillation  Atrial fibrillation           RECS:  continue NIV during sleep  continue with 02 humidification  cards fu  outpt fu with  for PFT and sleep study      Please call with any questions.    Em Merida MD  [facility]  [phone]

## 2020-10-17 ENCOUNTER — TRANSCRIPTION ENCOUNTER (OUTPATIENT)
Age: 70
End: 2020-10-17

## 2020-10-17 LAB
ANION GAP SERPL CALC-SCNC: 8 MMOL/L — SIGNIFICANT CHANGE UP (ref 5–17)
APTT BLD: 192.5 SEC — CRITICAL HIGH (ref 27.5–35.5)
APTT BLD: 57.1 SEC — HIGH (ref 27.5–35.5)
BUN SERPL-MCNC: 66 MG/DL — HIGH (ref 7–23)
CALCIUM SERPL-MCNC: 10.6 MG/DL — HIGH (ref 8.4–10.5)
CHLORIDE SERPL-SCNC: 98 MMOL/L — SIGNIFICANT CHANGE UP (ref 96–108)
CO2 SERPL-SCNC: 31 MMOL/L — SIGNIFICANT CHANGE UP (ref 22–31)
CREAT SERPL-MCNC: 1.54 MG/DL — HIGH (ref 0.5–1.3)
GLUCOSE SERPL-MCNC: 107 MG/DL — HIGH (ref 70–99)
HCT VFR BLD CALC: 25.7 % — LOW (ref 34.5–45)
HCT VFR BLD CALC: 26.9 % — LOW (ref 34.5–45)
HGB BLD-MCNC: 7.3 G/DL — LOW (ref 11.5–15.5)
HGB BLD-MCNC: 7.9 G/DL — LOW (ref 11.5–15.5)
INR BLD: 1.73 RATIO — HIGH (ref 0.88–1.16)
MCHC RBC-ENTMCNC: 26.4 PG — LOW (ref 27–34)
MCHC RBC-ENTMCNC: 27.1 PG — SIGNIFICANT CHANGE UP (ref 27–34)
MCHC RBC-ENTMCNC: 28.4 GM/DL — LOW (ref 32–36)
MCHC RBC-ENTMCNC: 29.4 GM/DL — LOW (ref 32–36)
MCV RBC AUTO: 92.4 FL — SIGNIFICANT CHANGE UP (ref 80–100)
MCV RBC AUTO: 93.1 FL — SIGNIFICANT CHANGE UP (ref 80–100)
NRBC # BLD: 0 /100 WBCS — SIGNIFICANT CHANGE UP (ref 0–0)
NRBC # BLD: 0 /100 WBCS — SIGNIFICANT CHANGE UP (ref 0–0)
PLATELET # BLD AUTO: 274 K/UL — SIGNIFICANT CHANGE UP (ref 150–400)
PLATELET # BLD AUTO: 279 K/UL — SIGNIFICANT CHANGE UP (ref 150–400)
POTASSIUM SERPL-MCNC: 5 MMOL/L — SIGNIFICANT CHANGE UP (ref 3.5–5.3)
POTASSIUM SERPL-SCNC: 5 MMOL/L — SIGNIFICANT CHANGE UP (ref 3.5–5.3)
PROTHROM AB SERPL-ACNC: 20.1 SEC — HIGH (ref 10.6–13.6)
RBC # BLD: 2.76 M/UL — LOW (ref 3.8–5.2)
RBC # BLD: 2.91 M/UL — LOW (ref 3.8–5.2)
RBC # FLD: 17.1 % — HIGH (ref 10.3–14.5)
RBC # FLD: 17.1 % — HIGH (ref 10.3–14.5)
SODIUM SERPL-SCNC: 137 MMOL/L — SIGNIFICANT CHANGE UP (ref 135–145)
WBC # BLD: 6.13 K/UL — SIGNIFICANT CHANGE UP (ref 3.8–10.5)
WBC # BLD: 7.76 K/UL — SIGNIFICANT CHANGE UP (ref 3.8–10.5)
WBC # FLD AUTO: 6.13 K/UL — SIGNIFICANT CHANGE UP (ref 3.8–10.5)
WBC # FLD AUTO: 7.76 K/UL — SIGNIFICANT CHANGE UP (ref 3.8–10.5)

## 2020-10-17 RX ORDER — WARFARIN SODIUM 2.5 MG/1
10 TABLET ORAL ONCE
Refills: 0 | Status: COMPLETED | OUTPATIENT
Start: 2020-10-17 | End: 2020-10-17

## 2020-10-17 RX ADMIN — Medication 3 MILLILITER(S): at 17:08

## 2020-10-17 RX ADMIN — HEPARIN SODIUM 1100 UNIT(S)/HR: 5000 INJECTION INTRAVENOUS; SUBCUTANEOUS at 10:54

## 2020-10-17 RX ADMIN — HEPARIN SODIUM 800 UNIT(S)/HR: 5000 INJECTION INTRAVENOUS; SUBCUTANEOUS at 18:39

## 2020-10-17 RX ADMIN — Medication 25 MILLIGRAM(S): at 17:08

## 2020-10-17 RX ADMIN — ATORVASTATIN CALCIUM 40 MILLIGRAM(S): 80 TABLET, FILM COATED ORAL at 21:04

## 2020-10-17 RX ADMIN — Medication 3 MILLILITER(S): at 10:59

## 2020-10-17 RX ADMIN — Medication 25 MILLIGRAM(S): at 05:33

## 2020-10-17 RX ADMIN — WARFARIN SODIUM 10 MILLIGRAM(S): 2.5 TABLET ORAL at 21:04

## 2020-10-17 RX ADMIN — Medication 3 MILLILITER(S): at 05:59

## 2020-10-17 RX ADMIN — PANTOPRAZOLE SODIUM 40 MILLIGRAM(S): 20 TABLET, DELAYED RELEASE ORAL at 05:34

## 2020-10-17 RX ADMIN — HEPARIN SODIUM 4000 UNIT(S): 5000 INJECTION INTRAVENOUS; SUBCUTANEOUS at 10:56

## 2020-10-17 RX ADMIN — Medication 100 MILLIGRAM(S): at 10:59

## 2020-10-17 RX ADMIN — HEPARIN SODIUM 0 UNIT(S)/HR: 5000 INJECTION INTRAVENOUS; SUBCUTANEOUS at 17:37

## 2020-10-17 RX ADMIN — POLYETHYLENE GLYCOL 3350 17 GRAM(S): 17 POWDER, FOR SOLUTION ORAL at 17:07

## 2020-10-17 RX ADMIN — Medication 3 MILLILITER(S): at 21:04

## 2020-10-17 NOTE — PROVIDER CONTACT NOTE (CRITICAL VALUE NOTIFICATION) - ASSESSMENT
Pt. asleep in bed, no signs or symptoms of bleeding.
Pt A&OX4, VSS (see flowsheet), no c/o CP or SOB, NSR on tele, Heparin gtt @ 13 ml/hr as per full anticoag nomogram, pt with slight nosebleed but no additional s/s bleeding
Pt A&OX4, VSS (see flowsheet), no c/o CP or SOB, NSR on tele, Heparin gtt @ 16 ml/hr as per full anticoag nomogram, pt with slight nosebleed but no additional s/s bleeding
Pt remains A&O x 4, VSS, denies chills/malaise/SOB, no s/s visible bleeding.
aox4 no s&s of bleeding noted

## 2020-10-17 NOTE — DISCHARGE NOTE PROVIDER - PROVIDER TOKENS
PROVIDER:[TOKEN:[[id number]]],PROVIDER:[TOKEN:[[id number]]] PROVIDER:[TOKEN:[[id number]]],PROVIDER:[TOKEN:[[id number]]],PROVIDER:[TOKEN:[[id number]],FOLLOWUP:[1 week],ESTABLISHEDPATIENT:[T]]

## 2020-10-17 NOTE — DISCHARGE NOTE PROVIDER - NSDCCPCAREPLAN_GEN_ALL_CORE_FT
PRINCIPAL DISCHARGE DIAGNOSIS  Diagnosis: Acute on chronic heart failure with preserved ejection fraction  Assessment and Plan of Treatment: Follow up with Dr. Foster   Weigh yourself daily.  If you gain 3lbs in 3 days, or 5lbs in a week call your Health Care Provider.  Do not eat or drink foods containing more than 2000mg of salt (sodium) in your diet every day.  Call your Health Care Provider if you have any swelling or increased swelling in your feet, ankles, and/or stomach.  Take all of your medication as directed.  If you become dizzy call your Health Care Provider.        SECONDARY DISCHARGE DIAGNOSES  Diagnosis: Mitral valve stenosis, unspecified etiology  Assessment and Plan of Treatment: ECHO performed with normal LV function with limited views to assess mitral valve however gradients were within normal limits  No indication for surgical intervention at this time    Diagnosis: MISAEL (acute kidney injury)  Assessment and Plan of Treatment: Improved  Avoid taking (NSAIDs) - (ex: Ibuprofen, Advil, Celebrex, Naprosyn)  Avoid taking any nephrotoxic agents (can harm kidneys) - Intravenous contrast for diagnostic testing, combination cold medications.  Have all medications adjusted for your renal function by your Health Care Provider.  Blood pressure control is important.  Take all medication as prescribed.      Diagnosis: MIGUEL (obstructive sleep apnea)  Assessment and Plan of Treatment: Follow up with  for PFT and sleep study    Diagnosis: Atrial fibrillation, unspecified type  Assessment and Plan of Treatment: Atrial fibrillation is the most common heart rhythm problem.  The condition puts you at risk for has stroke and heart attack  It helps if you control your blood pressure, not drink more than 1-2 alcohol drinks per day, cut down on caffeine, getting treatment for over active thyroid gland, and get regular exercise  Call your doctor if you feel your heart racing or beating unusually, chest tightness or pain, lightheaded, faint, shortness of breath especially with exercise  It is important to take your heart medication as prescribed  You may be on anticoagulation which is very important to take as directed - you may need blood work to monitor drug levels       PRINCIPAL DISCHARGE DIAGNOSIS  Diagnosis: Acute on chronic heart failure with preserved ejection fraction  Assessment and Plan of Treatment: Follow up with Dr. Foster.  Weigh yourself daily.  If you gain 3lbs in 3 days, or 5lbs in a week call your Health Care Provider.  Do not eat or drink foods containing more than 2000mg of salt (sodium) in your diet every day.  Call your Health Care Provider if you have any swelling or increased swelling in your feet, ankles, and/or stomach.  Take all of your medication as directed.  If you become dizzy call your Health Care Provider.        SECONDARY DISCHARGE DIAGNOSES  Diagnosis: MISAEL (acute kidney injury)  Assessment and Plan of Treatment: Restart Lasix 40mg twice a day and follow up with Dr Frias (nephrology) next week for repeat bloodwork.    Diagnosis: Mitral valve stenosis, unspecified etiology  Assessment and Plan of Treatment: ECHO performed with normal LV function with limited views to assess mitral valve however gradients were within normal limits.  No indication for surgical intervention at this time.    Diagnosis: Atrial fibrillation, unspecified type  Assessment and Plan of Treatment: Atrial fibrillation is the most common heart rhythm problem.  The condition puts you at risk for has stroke and heart attack.  It helps if you control your blood pressure, not drink more than 1-2 alcohol drinks per day, cut down on caffeine, getting treatment for over active thyroid gland, and get regular exercise.  Call your doctor if you feel your heart racing or beating unusually, chest tightness or pain, lightheaded, faint, shortness of breath especially with exercise.  It is important to take your heart medication as prescribed.  You may be on anticoagulation which is very important to take as directed - you may need blood work to monitor drug levels.      Diagnosis: MIGUEL (obstructive sleep apnea)  Assessment and Plan of Treatment: Follow up with  for PFT and sleep study.    Diagnosis: MISAEL (acute kidney injury)  Assessment and Plan of Treatment: Improved  Avoid taking (NSAIDs) - (ex: Ibuprofen, Advil, Celebrex, Naprosyn)  Avoid taking any nephrotoxic agents (can harm kidneys) - Intravenous contrast for diagnostic testing, combination cold medications.  Have all medications adjusted for your renal function by your Health Care Provider.  Blood pressure control is important.  Take all medication as prescribed.       PRINCIPAL DISCHARGE DIAGNOSIS  Diagnosis: Acute on chronic heart failure with preserved ejection fraction  Assessment and Plan of Treatment: Follow up with Dr. Foster.  Weigh yourself daily.  If you gain 3lbs in 3 days, or 5lbs in a week call your Health Care Provider.  Do not eat or drink foods containing more than 2000mg of salt (sodium) in your diet every day.  Call your Health Care Provider if you have any swelling or increased swelling in your feet, ankles, and/or stomach.  Take all of your medication as directed.  If you become dizzy call your Health Care Provider.        SECONDARY DISCHARGE DIAGNOSES  Diagnosis: MISAEL (acute kidney injury)  Assessment and Plan of Treatment: Restart Lasix 40mg twice a day and follow up with Dr Frias (nephrology) next week for repeat bloodwork.  Avoid taking (NSAIDs) - (ex: Ibuprofen, Advil, Celebrex, Naprosyn)  Avoid taking any nephrotoxic agents (can harm kidneys) - Intravenous contrast for diagnostic testing, combination cold medications.  Have all medications adjusted for your renal function by your Health Care Provider.  Blood pressure control is important.  Take all medication as prescribed.    Diagnosis: Mitral valve stenosis, unspecified etiology  Assessment and Plan of Treatment: ECHO performed with normal LV function with limited views to assess mitral valve however gradients were within normal limits.  No indication for surgical intervention at this time.    Diagnosis: Atrial fibrillation, unspecified type  Assessment and Plan of Treatment: Atrial fibrillation is the most common heart rhythm problem.  The condition puts you at risk for has stroke and heart attack.  It helps if you control your blood pressure, not drink more than 1-2 alcohol drinks per day, cut down on caffeine, getting treatment for over active thyroid gland, and get regular exercise.  Call your doctor if you feel your heart racing or beating unusually, chest tightness or pain, lightheaded, faint, shortness of breath especially with exercise.  It is important to take your heart medication as prescribed.  You may be on anticoagulation which is very important to take as directed - you may need blood work to monitor drug levels.      Diagnosis: MIGUEL (obstructive sleep apnea)  Assessment and Plan of Treatment: Follow up with  for PFT and sleep study.

## 2020-10-17 NOTE — DISCHARGE NOTE PROVIDER - CARE PROVIDERS DIRECT ADDRESSES
,[email or web address]

## 2020-10-17 NOTE — DISCHARGE NOTE PROVIDER - HOSPITAL COURSE
Ms. Alcantara is a 70 year old woman with HFpEF in the context of valvular heart disease, likely rheumatic, complicated by pulmonary hypertension, morbid obesity (BMI 41), HTN (diagnosed in 30's and reportedly well controlled with medications), severe MR s/p mechanical MVR 1999 ([facility] with Dr. Anderson) and severe TR s/p TVR 2012 ([facility]), ?COPD (home 02; no prior tobacco use), AFib on Coumadin (s/p ablation), CKD 3 (b/l SCr ~1.3-1.6), MIGUEL and gout. followed by Dr. Duarte (cardiologist) who presented to [facility] with SOB and chest tightness transferred on 8/18 for possible mitral intervention for possible mitral stenosis with Dr. Fernandez. TTE performed with normal LV function with limited views to assess mitral valve however gradients were not significantly. Suspect she has a component of acute on chronic HFpEF as well as OHS/MIGUEL.  She was diuresed ~20 lbs and developed pre-renal MISAEL with low estimated RA pressure on TTE for which R/LHC performed as below. She is not interested in implantation of Cardiomems due to concern that she would not be able to manage the device at home. Will continue to hold diuretics and monitor renal function which is slowly improving. Heparin to coumadin bridge for Afib.         Ms. Alcantara is a 70 year old woman with HFpEF in the context of valvular heart disease, likely rheumatic, complicated by pulmonary hypertension, morbid obesity (BMI 41), HTN (diagnosed in 30's and reportedly well controlled with medications), severe MR s/p mechanical MVR 1999 ([facility] with Dr. Anderson) and severe TR s/p TVR 2012 ([facility]), ?COPD (home 02; no prior tobacco use), AFib on Coumadin (s/p ablation), CKD 3 (b/l SCr ~1.3-1.6), MIGUEL and gout. followed by Dr. Duarte (cardiologist) who presented to [facility] with SOB and chest tightness transferred on 8/18 for possible mitral intervention for possible mitral stenosis with Dr. Fernandez. TTE performed with normal LV function with limited views to assess mitral valve however gradients were not significantly. Suspect she has a component of acute on chronic HFpEF as well as OHS/MIGUEL.  She was diuresed ~20 lbs and developed pre-renal MISAEL with low estimated RA pressure on TTE for which R/LHC was performed. She is not interested in implantation of Cardiomems due to concern that she would not be able to manage the device at home. Will continue to hold diuretics and monitor renal function which is slowly improving. Heparin to coumadin bridge for Afib, now with therapeutic INR. Stable for discharge to home with outpatient follow up with Heart Failure, Pulmonology, and PCP.          Ms. Alcantara is a 70 year old woman with HFpEF in the context of valvular heart disease, likely rheumatic, complicated by pulmonary hypertension, morbid obesity (BMI 41), HTN (diagnosed in 30's and reportedly well controlled with medications), severe MR s/p mechanical MVR 1999 ([facility] with Dr. Anderson) and severe TR s/p TVR 2012 ([facility]), ?COPD (home 02; no prior tobacco use), AFib on Coumadin (s/p ablation), CKD 3 (b/l SCr ~1.3-1.6), MIGUEL and gout. followed by Dr. Duarte (cardiologist) who presented to [facility] with SOB and chest tightness transferred on 8/18 for possible mitral intervention for possible mitral stenosis with Dr. Fernandez. TTE performed with normal LV function with limited views to assess mitral valve however gradients were not significantly. Suspect she has a component of acute on chronic HFpEF as well as OHS/MIGUEL.  She was diuresed ~20 lbs and developed pre-renal MISAEL with low estimated RA pressure on TTE for which R/LHC was performed. She is not interested in implantation of Cardiomems due to concern that she would not be able to manage the device at home. Renal function slowly improving, restarting home Lasix 40mg BID. Heparin to coumadin bridge for Afib, now with therapeutic INR. Stable for discharge to home with outpatient follow up with Heart Failure, Pulmonology, Nephrology, and PCP.

## 2020-10-17 NOTE — PROGRESS NOTE ADULT - ASSESSMENT
70 year old woman with chf/ pulmonary hypertension, morbid obesity (BMI 41), HTN severe MR s/p mechanical MVR 1999 ([facility] with Dr. Anderson) and severe TR s/p TVR 2012 ([facility]), , AFib on Coumadin (s/p ablation), CKD 3 (b/l SCr ~1.3-1.6), MIGUEL and gout presented to [facility] with SOB and chest tightness transferred. required diuretics for chf. dx with mitral valve disease. required diuretics  now cr noticed to be elevated to 2.2 range.     1- MISAEL on ckd III   2- hypercalcemia   3- chf   4- anemia     misael in setting of diuretics use likely. given cr improving will start diuretics soon again   cr seems to have stabilized at this level   consider endo consult for high ca+   anemia trend hb  if lower then prbc   cont nebs

## 2020-10-17 NOTE — DISCHARGE NOTE PROVIDER - CARE PROVIDER_API CALL
Chava Foster)  Cardiology; Internal Medicine  [address], [address] [place] [address]  Phone: [phone]  Fax: [phone]  Follow Up Time:     JUANA JENNINGS  INTERNAL MEDICINE  [address], [address]  [place] [address]  Phone: [phone]  Fax: [phone]  Follow Up Time:    Chava Foster)  Cardiology; Internal Medicine  [address], [address] [place] [address]  Phone: [phone]  Fax: [phone]  Follow Up Time:     JUANA JENNINGS  INTERNAL MEDICINE  [address], [address]  [place] [address]  Phone: [phone]  Fax: [phone]  Follow Up Time:     Ai Frias  NEPHROLOGY  [address], [address]  [place] [address]  Phone: [phone]  Fax: [phone]  Established Patient  Follow Up Time: 1 week

## 2020-10-17 NOTE — PROVIDER CONTACT NOTE (CRITICAL VALUE NOTIFICATION) - ACTION/TREATMENT ORDERED:
Follow heparin nomogram. Turn heparin off for 1 hour and resume at 16cc/hr.
Pause Heparin gtt for 1 hour then restart at 10 ml/hr and follow nomogram, no additional orders or interventions at this time, will continue to monitor pt
Pause Heparin gtt for 1 hour then restart at 13 ml/hr and follow nomogram, no additional orders or interventions at this time, will continue to monitor pt
follow hep nomogram. stop infusion for 60min & decrease rate to 8ml/hr. will continue to monitor patient
May follow Heparin nomogram: Hold x 60min, then restart at 7ml/hr.

## 2020-10-17 NOTE — PROGRESS NOTE ADULT - SUBJECTIVE AND OBJECTIVE BOX
SUBJECTIVE / OVERNIGHT EVENTS: pt denies chest pain, shortness of breath says she feels much better than before    MEDICATIONS  (STANDING):  albuterol/ipratropium for Nebulization 3 milliLiter(s) Nebulizer every 6 hours  allopurinol 100 milliGRAM(s) Oral daily  atorvastatin 40 milliGRAM(s) Oral at bedtime  heparin  Infusion. 1000 Unit(s)/Hr (10 mL/Hr) IV Continuous <Continuous>  lidocaine   Patch 1 Patch Transdermal every 24 hours  metoprolol tartrate 25 milliGRAM(s) Oral two times a day  pantoprazole    Tablet 40 milliGRAM(s) Oral before breakfast  polyethylene glycol 3350 17 Gram(s) Oral daily  senna 2 Tablet(s) Oral at bedtime    MEDICATIONS  (PRN):  acetaminophen   Tablet .. 650 milliGRAM(s) Oral every 6 hours PRN Temp greater or equal to 38.5C (101.3F), Mild Pain (1 - 3)  heparin   Injectable 9000 Unit(s) IV Push every 6 hours PRN For aPTT less than 40  heparin   Injectable 4000 Unit(s) IV Push every 6 hours PRN For aPTT between 40 - 57    Vital Signs Last 24 Hrs  T(C): 36.9 (17 Oct 2020 20:24), Max: 36.9 (17 Oct 2020 20:24)  T(F): 98.4 (17 Oct 2020 20:24), Max: 98.4 (17 Oct 2020 20:24)  HR: 68 (17 Oct 2020 20:24) (62 - 70)  BP: 124/57 (17 Oct 2020 20:24) (107/62 - 149/84)  BP(mean): --  RR: 18 (17 Oct 2020 20:24) (18 - 18)  SpO2: 96% (17 Oct 2020 20:24) (96% - 99%)      ENT: No congestion, ear pain, or sore throat.  Cardiovascular: No chest pain or palpation.  Respiratory: No cough, shortness of breath, congestion, or wheezing.  Gastrointestinal: No abdominal pain, nausea, vomiting, or diarrhea.  Genitourinary: No dysuria.  Musculoskeletal: No joint swelling.  Neurologic: No headache.    PHYSICAL EXAM:  GENERAL: NAD  EYES: EOMI, PERRLA  NECK: Supple, No JVD  CHEST/LUNG: crackles at bases  HEART:  S1 , S2 +  ABDOMEN: soft , bs+  EXTREMITIES:  edema+    LABS:  10-17    137  |  98  |  66<H>  ----------------------------<  107<H>  5.0   |  31  |  1.54<H>    Ca    10.6<H>      17 Oct 2020 06:02      Creatinine Trend: 1.54 <--, 1.53 <--, 1.58 <--, 1.65 <--, 1.66 <--, 1.78 <--, 1.87 <--, 1.91 <--                        7.9    7.76  )-----------( 274      ( 17 Oct 2020 16:52 )             26.9     Urine Studies:              PT/INR - ( 17 Oct 2020 09:00 )   PT: 20.1 sec;   INR: 1.73 ratio         PTT - ( 17 Oct 2020 16:52 )  PTT:192.5 sec

## 2020-10-17 NOTE — PROGRESS NOTE ADULT - SUBJECTIVE AND OBJECTIVE BOX
[facility]   [phone]  RENAL FOLLOW UP NOTE  --------------------------------------------------------------------------------  Chief Complaint:    24 hour events/subjective:    states breathing is much better     PAST HISTORY  --------------------------------------------------------------------------------  No significant changes to PMH, PSH, FHx, SHx, unless otherwise noted    ALLERGIES & MEDICATIONS  --------------------------------------------------------------------------------  Allergies    No Known Allergies    Intolerances      Standing Inpatient Medications  albuterol/ipratropium for Nebulization 3 milliLiter(s) Nebulizer every 6 hours  allopurinol 100 milliGRAM(s) Oral daily  atorvastatin 40 milliGRAM(s) Oral at bedtime  heparin  Infusion. 1000 Unit(s)/Hr IV Continuous <Continuous>  lidocaine   Patch 1 Patch Transdermal every 24 hours  metoprolol tartrate 25 milliGRAM(s) Oral two times a day  pantoprazole    Tablet 40 milliGRAM(s) Oral before breakfast  polyethylene glycol 3350 17 Gram(s) Oral daily  senna 2 Tablet(s) Oral at bedtime  warfarin 10 milliGRAM(s) Oral once    PRN Inpatient Medications  acetaminophen   Tablet .. 650 milliGRAM(s) Oral every 6 hours PRN  heparin   Injectable 9000 Unit(s) IV Push every 6 hours PRN  heparin   Injectable 4000 Unit(s) IV Push every 6 hours PRN      REVIEW OF SYSTEMS  --------------------------------------------------------------------------------    Gen: denies , fevers/chills,  CVS: denies chest pain/palpitations  Resp: denies SOB/Cough  GI: Denies N/V/Abd pain  : Denies dysuria/oliguria/hematuria    All other systems were reviewed and are negative, except as noted.    VITALS/PHYSICAL EXAM  --------------------------------------------------------------------------------  T(C): 36.4 (10-17-20 @ 11:29), Max: 36.8 (10-16-20 @ 21:10)  HR: 70 (10-17-20 @ 17:05) (62 - 70)  BP: 149/84 (10-17-20 @ 17:05) (107/62 - 149/84)  RR: 18 (10-17-20 @ 11:29) (18 - 18)  SpO2: 97% (10-17-20 @ 15:12) (96% - 99%)  Wt(kg): --        10-16-20 @ 07:01  -  10-17-20 @ 07:00  --------------------------------------------------------  IN: 1110 mL / OUT: 800 mL / NET: 310 mL    10-17-20 @ 07:01  -  10-17-20 @ 18:37  --------------------------------------------------------  IN: 560 mL / OUT: 0 mL / NET: 560 mL      Physical Exam:  	  alert and oriented on O2    JVD none   	Pulm: decrease bs  no rales or ronchi or wheezing  	CV: RRR, S1S2; no rub  	Abd: +BS, soft, nontender/nondistended  	: No suprapubic tenderness  	UE: Warm, no cyanosis  no clubbing,  no edema  	LE: Warm, no cyanosis  no clubbing, non pitting no  edema  	Neuro: alert and oriented. speech coherent       LABS/STUDIES  --------------------------------------------------------------------------------              7.9    7.76  >-----------<  274      [10-17-20 @ 16:52]              26.9     137  |  98  |  66  ----------------------------<  107      [10-17-20 @ 06:02]  5.0   |  31  |  1.54        Ca     10.6     [10-17-20 @ 06:02]      PT/INR: PT 20.1 , INR 1.73       [10-17-20 @ 09:00]  PTT: 192.5      [10-17-20 @ 16:52]      Creatinine Trend:  SCr 1.54 [10-17 @ 06:02]  SCr 1.53 [10-16 @ 07:18]  SCr 1.58 [10-15 @ 06:40]  SCr 1.65 [10-14 @ 05:34]  SCr 1.66 [10-13 @ 06:06]              Urinalysis - [09-19-20 @ 04:15]      Color Light Yellow / Appearance Clear / SG 1.016 / pH 6.0      Gluc Negative / Ketone Negative  / Bili Negative / Urobili <2 mg/dL       Blood Negative / Protein Negative / Leuk Est Negative / Nitrite Negative      RBC  / WBC  / Hyaline  / Gran  / Sq Epi  / Non Sq Epi  / Bacteria       PTH -- (Ca 10.5)      [10-06-20 @ 06:17]   183  Vitamin D (25OH) 79.0      [10-06-20 @ 06:17]  HbA1c 5.9      [11-11-17 @ 14:00]  TSH 1.92      [09-19-20 @ 10:52]    Immunofixation Serum:   No Monoclonal Band Identified    Reference Range: None Detected      [10-06-20 @ 06:18]  SPEP Interpretation: Polyclonal Gammopathy      [10-06-20 @ 06:18]

## 2020-10-17 NOTE — DISCHARGE NOTE PROVIDER - NSDCMRMEDTOKEN_GEN_ALL_CORE_FT
acetaminophen 325 mg oral tablet: 2 tab(s) orally every 6 hours, As needed, Temp greater or equal to 38C (100.4F), Mild Pain (1 - 3)  allopurinol 100 mg oral tablet: 1 tab(s) orally once a day  allopurinol 100 mg oral tablet: 1 tab(s) orally 2 times a day (after meals)  budesonide-formoterol 160 mcg-4.5 mcg/inh inhalation aerosol: 2  inhaled 2 times a day  Coumadin 5 mg oral tablet: 1 tab(s) orally once a day  Coumadin 5 mg oral tablet: 1 tab(s) orally once a day  furosemide 100 mg/100 mL-0.9% intravenous solution: 40 milliliter(s) intravenous 2 times a day  furosemide 40 mg oral tablet: 1 tab(s) orally once a day  ipratropium-albuterol 0.5 mg-2.5 mg/3 mLinhalation solution: 3 milliliter(s) inhaled every 6 hours  Klor-Con M20 oral tablet, extended release: orally once a day  metoprolol tartrate 25 mg oral tablet: 1 tab(s) orally 2 times a day  pantoprazole 40 mg oral delayed release tablet: 1 tab(s) orally 2 times a day  pantoprazole 40 mg oral delayed release tablet: 1 tab(s) orally once a day (before a meal)  simvastatin 40 mg oral tablet: 1 tab(s) orally once a day (at bedtime)  simvastatin 5 mg oral tablet: orally once a day   allopurinol 100 mg oral tablet: 1 tab(s) orally once a day  atorvastatin 40 mg oral tablet: 1 tab(s) orally once a day (at bedtime)  budesonide-formoterol 160 mcg-4.5 mcg/inh inhalation aerosol: 2  inhaled 2 times a day  Coumadin 5 mg oral tablet: 1 tab(s) orally once a day  ipratropium-albuterol 0.5 mg-2.5 mg/3 mLinhalation solution: 3 milliliter(s) inhaled every 6 hours  lidocaine 5% topical film: Apply topically to affected area every 24 hours   metoprolol tartrate 25 mg oral tablet: 1 tab(s) orally 2 times a day  pantoprazole 40 mg oral delayed release tablet: 1 tab(s) orally once a day (before a meal)  polyethylene glycol 3350 oral powder for reconstitution: 17 gram(s) orally once a day  senna oral tablet: 2 tab(s) orally once a day (at bedtime)   allopurinol 100 mg oral tablet: 1 tab(s) orally once a day  atorvastatin 40 mg oral tablet: 1 tab(s) orally once a day (at bedtime)  budesonide-formoterol 160 mcg-4.5 mcg/inh inhalation aerosol: 2  inhaled 2 times a day  Coumadin 5 mg oral tablet: 1 tab(s) orally once a day  ipratropium-albuterol 0.5 mg-2.5 mg/3 mLinhalation solution: 3 milliliter(s) inhaled every 6 hours  Lasix 40 mg oral tablet: 1 tab(s) orally 2 times a day   lidocaine 5% topical film: Apply topically to affected area every 24 hours   metoprolol tartrate 25 mg oral tablet: 1 tab(s) orally 2 times a day  pantoprazole 40 mg oral delayed release tablet: 1 tab(s) orally once a day (before a meal)  Physical Therapy: Please diagnose and treat.   polyethylene glycol 3350 oral powder for reconstitution: 17 gram(s) orally once a day  senna oral tablet: 2 tab(s) orally once a day (at bedtime)

## 2020-10-17 NOTE — DISCHARGE NOTE PROVIDER - NSDCFUADDAPPT_GEN_ALL_CORE_FT
Please schedule follow up with heart failure, pulmonary, and your primary care physician in 1 week from discharge. Please schedule follow up with heart failure, pulmonary, nephrology, and your primary care physician in 1 week from discharge.

## 2020-10-17 NOTE — DISCHARGE NOTE PROVIDER - NSDCCAREPROVSEEN_GEN_ALL_CORE_FT
----- Message from Brianna Landeros sent at 2/15/2017 11:41 AM CST -----  Contact: Linda @[facility]  [phone]  Pt is having difficulty swallowing solids food and the nurse wanted to notify the provider of this pt has appointment on 2/21 Thanks !   Renny Newman  [facility] Medicine, Advance Practiceam  [facility] Cardiology, Heart Failure Svc

## 2020-10-18 LAB
ANION GAP SERPL CALC-SCNC: 10 MMOL/L — SIGNIFICANT CHANGE UP (ref 5–17)
APTT BLD: 66.8 SEC — HIGH (ref 27.5–35.5)
APTT BLD: 75.6 SEC — HIGH (ref 27.5–35.5)
BUN SERPL-MCNC: 68 MG/DL — HIGH (ref 7–23)
CALCIUM SERPL-MCNC: 10.6 MG/DL — HIGH (ref 8.4–10.5)
CHLORIDE SERPL-SCNC: 96 MMOL/L — SIGNIFICANT CHANGE UP (ref 96–108)
CO2 SERPL-SCNC: 30 MMOL/L — SIGNIFICANT CHANGE UP (ref 22–31)
CREAT SERPL-MCNC: 1.62 MG/DL — HIGH (ref 0.5–1.3)
GLUCOSE SERPL-MCNC: 111 MG/DL — HIGH (ref 70–99)
HCT VFR BLD CALC: 27.4 % — LOW (ref 34.5–45)
HGB BLD-MCNC: 7.6 G/DL — LOW (ref 11.5–15.5)
INR BLD: 2.15 RATIO — HIGH (ref 0.88–1.16)
MCHC RBC-ENTMCNC: 25.9 PG — LOW (ref 27–34)
MCHC RBC-ENTMCNC: 27.7 GM/DL — LOW (ref 32–36)
MCV RBC AUTO: 93.5 FL — SIGNIFICANT CHANGE UP (ref 80–100)
NRBC # BLD: 0 /100 WBCS — SIGNIFICANT CHANGE UP (ref 0–0)
PLATELET # BLD AUTO: 287 K/UL — SIGNIFICANT CHANGE UP (ref 150–400)
POTASSIUM SERPL-MCNC: 5.1 MMOL/L — SIGNIFICANT CHANGE UP (ref 3.5–5.3)
POTASSIUM SERPL-SCNC: 5.1 MMOL/L — SIGNIFICANT CHANGE UP (ref 3.5–5.3)
PROTHROM AB SERPL-ACNC: 24.9 SEC — HIGH (ref 10.6–13.6)
RBC # BLD: 2.93 M/UL — LOW (ref 3.8–5.2)
RBC # FLD: 17.2 % — HIGH (ref 10.3–14.5)
SODIUM SERPL-SCNC: 136 MMOL/L — SIGNIFICANT CHANGE UP (ref 135–145)
WBC # BLD: 5.92 K/UL — SIGNIFICANT CHANGE UP (ref 3.8–10.5)
WBC # FLD AUTO: 5.92 K/UL — SIGNIFICANT CHANGE UP (ref 3.8–10.5)

## 2020-10-18 RX ORDER — WARFARIN SODIUM 2.5 MG/1
8 TABLET ORAL ONCE
Refills: 0 | Status: COMPLETED | OUTPATIENT
Start: 2020-10-18 | End: 2020-10-18

## 2020-10-18 RX ADMIN — HEPARIN SODIUM 800 UNIT(S)/HR: 5000 INJECTION INTRAVENOUS; SUBCUTANEOUS at 07:52

## 2020-10-18 RX ADMIN — Medication 3 MILLILITER(S): at 06:11

## 2020-10-18 RX ADMIN — PANTOPRAZOLE SODIUM 40 MILLIGRAM(S): 20 TABLET, DELAYED RELEASE ORAL at 06:11

## 2020-10-18 RX ADMIN — SENNA PLUS 2 TABLET(S): 8.6 TABLET ORAL at 11:55

## 2020-10-18 RX ADMIN — Medication 25 MILLIGRAM(S): at 06:11

## 2020-10-18 RX ADMIN — Medication 3 MILLILITER(S): at 17:14

## 2020-10-18 RX ADMIN — Medication 3 MILLILITER(S): at 23:57

## 2020-10-18 RX ADMIN — Medication 3 MILLILITER(S): at 11:55

## 2020-10-18 RX ADMIN — ATORVASTATIN CALCIUM 40 MILLIGRAM(S): 80 TABLET, FILM COATED ORAL at 21:07

## 2020-10-18 RX ADMIN — WARFARIN SODIUM 8 MILLIGRAM(S): 2.5 TABLET ORAL at 21:07

## 2020-10-18 RX ADMIN — Medication 100 MILLIGRAM(S): at 11:55

## 2020-10-18 RX ADMIN — Medication 25 MILLIGRAM(S): at 17:14

## 2020-10-18 RX ADMIN — HEPARIN SODIUM 800 UNIT(S)/HR: 5000 INJECTION INTRAVENOUS; SUBCUTANEOUS at 00:45

## 2020-10-18 NOTE — PROGRESS NOTE ADULT - PROBLEM SELECTOR PLAN 2
Supplemental O2  nebs
- Controlled
Supplemental O2  RA  ABG today> PO2 44 w/ CO2 62  SPO2 75%  4lpm N/C resumed  nebs
Supplemental O2  nebs
rate control  heparin gtt
rate control+ac
rate control+ac- waiting fro inr to be therapeutic
s/p bumex x 1 yesterday given patient's symptoms  monitor I/O  Heart failure team following
trial of PO bumex today given patient's symptoms  monitor I/O  Heart failure team following
rate control  heparin gtt
- controlled
- controlled

## 2020-10-18 NOTE — PROGRESS NOTE ADULT - SUBJECTIVE AND OBJECTIVE BOX
SUBJECTIVE / OVERNIGHT EVENTS: pt denies chest pain, shortness of breath says she feels much better than before    MEDICATIONS  (STANDING):  albuterol/ipratropium for Nebulization 3 milliLiter(s) Nebulizer every 6 hours  allopurinol 100 milliGRAM(s) Oral daily  atorvastatin 40 milliGRAM(s) Oral at bedtime  heparin  Infusion. 1000 Unit(s)/Hr (10 mL/Hr) IV Continuous <Continuous>  lidocaine   Patch 1 Patch Transdermal every 24 hours  metoprolol tartrate 25 milliGRAM(s) Oral two times a day  pantoprazole    Tablet 40 milliGRAM(s) Oral before breakfast  polyethylene glycol 3350 17 Gram(s) Oral daily  senna 2 Tablet(s) Oral at bedtime    MEDICATIONS  (PRN):  acetaminophen   Tablet .. 650 milliGRAM(s) Oral every 6 hours PRN Temp greater or equal to 38.5C (101.3F), Mild Pain (1 - 3)  heparin   Injectable 9000 Unit(s) IV Push every 6 hours PRN For aPTT less than 40  heparin   Injectable 4000 Unit(s) IV Push every 6 hours PRN For aPTT between 40 - 57    Vital Signs Last 24 Hrs  T(C): 37.1 (18 Oct 2020 20:24), Max: 37.1 (18 Oct 2020 20:24)  T(F): 98.8 (18 Oct 2020 20:24), Max: 98.8 (18 Oct 2020 20:24)  HR: 90 (18 Oct 2020 20:42) (61 - 95)  BP: 118/73 (18 Oct 2020 20:24) (115/67 - 160/64)  BP(mean): --  RR: 18 (18 Oct 2020 20:24) (18 - 18)  SpO2: 98% (18 Oct 2020 20:42) (95% - 99%)    ENT: No congestion, ear pain, or sore throat.  Cardiovascular: No chest pain or palpation.  Respiratory: No cough, shortness of breath, congestion, or wheezing.  Gastrointestinal: No abdominal pain, nausea, vomiting, or diarrhea.  Genitourinary: No dysuria.  Musculoskeletal: No joint swelling.  Neurologic: No headache.    PHYSICAL EXAM:  GENERAL: NAD  EYES: EOMI, PERRLA  NECK: Supple, No JVD  CHEST/LUNG: crackles at bases  HEART:  S1 , S2 +  ABDOMEN: soft , bs+  EXTREMITIES:  edema+    LABS:  10-18    136  |  96  |  68<H>  ----------------------------<  111<H>  5.1   |  30  |  1.62<H>    Ca    10.6<H>      18 Oct 2020 07:11      Creatinine Trend: 1.62 <--, 1.54 <--, 1.53 <--, 1.58 <--, 1.65 <--, 1.66 <--, 1.78 <--, 1.87 <--                        7.6    5.92  )-----------( 287      ( 18 Oct 2020 07:18 )             27.4     Urine Studies:              PT/INR - ( 18 Oct 2020 07:14 )   PT: 24.9 sec;   INR: 2.15 ratio         PTT - ( 18 Oct 2020 07:14 )  PTT:66.8 sec     PTT - ( 17 Oct 2020 16:52 )  PTT:192.5 sec

## 2020-10-18 NOTE — PROGRESS NOTE ADULT - PROBLEM SELECTOR PROBLEM 1
Atrial fibrillation
Acute on chronic heart failure with preserved ejection fraction
Atrial fibrillation
CHF (congestive heart failure)
MISAEL (acute kidney injury)
MISAEL (acute kidney injury)
CHF (congestive heart failure)
Acute on chronic heart failure with preserved ejection fraction
Acute on chronic heart failure with preserved ejection fraction

## 2020-10-18 NOTE — PROGRESS NOTE ADULT - PROBLEM SELECTOR PROBLEM 2
MIGUEL (obstructive sleep apnea)
Atrial fibrillation
CHF (congestive heart failure)
CHF (congestive heart failure)
Essential hypertension
MIGUEL (obstructive sleep apnea)
Atrial fibrillation
Essential hypertension
Essential hypertension

## 2020-10-18 NOTE — PROGRESS NOTE ADULT - PROBLEM SELECTOR PROBLEM 5
Chronic kidney disease (CKD), stage III (moderate)
Chronic kidney disease (CKD), stage III (moderate)
Mitral valve stenosis, unspecified etiology
Chronic kidney disease (CKD), stage III (moderate)
Mitral valve stenosis, unspecified etiology
Tricuspid valve replaced
Mitral valve stenosis, unspecified etiology

## 2020-10-18 NOTE — PROGRESS NOTE ADULT - PROBLEM SELECTOR PLAN 5
- Acute renal failure likely due to effect of diuretics. We will continue to monitor closely.
- SCr above baseline of 1.3-1.6  - continue to monitor
ELIZABETH  Cards f/u
- Acute renal failure likely due to effect of diuretics. We will continue to monitor closely.
- SCr above baseline of 1.3-1.6  - continue to monitor
- SCr above baseline of 1.3-1.6  - continue to monitor
- Worsening BUN/Cr likely from overdiuresis, holding as above  - Nephrology consulted
- Worsening BUN/Cr likely from overdiuresis, holding as above  - Nephrology consulted
- Worsening BUN/Cr likely from overdiuresis, holding as above and continues to improve  - Nephrology recs appreciated
- management per CTX.
Cardiology following
ELIZABETH  Cardiology consulted
ELIZABETH  Cards f/u
ELIZABETH / CATH   Cards f/u
TTE / ELIZABETH  Cardiology following
cath with neg CAD
ELIZABETH / CATH   Cards f/u

## 2020-10-19 VITALS
DIASTOLIC BLOOD PRESSURE: 66 MMHG | OXYGEN SATURATION: 98 % | RESPIRATION RATE: 18 BRPM | HEART RATE: 61 BPM | SYSTOLIC BLOOD PRESSURE: 116 MMHG | TEMPERATURE: 98 F

## 2020-10-19 LAB
ANION GAP SERPL CALC-SCNC: 10 MMOL/L — SIGNIFICANT CHANGE UP (ref 5–17)
APTT BLD: 62.3 SEC — HIGH (ref 27.5–35.5)
BUN SERPL-MCNC: 70 MG/DL — HIGH (ref 7–23)
CALCIUM SERPL-MCNC: 10.3 MG/DL — SIGNIFICANT CHANGE UP (ref 8.4–10.5)
CHLORIDE SERPL-SCNC: 96 MMOL/L — SIGNIFICANT CHANGE UP (ref 96–108)
CO2 SERPL-SCNC: 29 MMOL/L — SIGNIFICANT CHANGE UP (ref 22–31)
CREAT SERPL-MCNC: 1.74 MG/DL — HIGH (ref 0.5–1.3)
GLUCOSE SERPL-MCNC: 110 MG/DL — HIGH (ref 70–99)
HCT VFR BLD CALC: 26.5 % — LOW (ref 34.5–45)
HGB BLD-MCNC: 7.7 G/DL — LOW (ref 11.5–15.5)
INR BLD: 2.35 RATIO — HIGH (ref 0.88–1.16)
MCHC RBC-ENTMCNC: 26.6 PG — LOW (ref 27–34)
MCHC RBC-ENTMCNC: 29.1 GM/DL — LOW (ref 32–36)
MCV RBC AUTO: 91.7 FL — SIGNIFICANT CHANGE UP (ref 80–100)
NRBC # BLD: 0 /100 WBCS — SIGNIFICANT CHANGE UP (ref 0–0)
PLATELET # BLD AUTO: 278 K/UL — SIGNIFICANT CHANGE UP (ref 150–400)
POTASSIUM SERPL-MCNC: 4.8 MMOL/L — SIGNIFICANT CHANGE UP (ref 3.5–5.3)
POTASSIUM SERPL-SCNC: 4.8 MMOL/L — SIGNIFICANT CHANGE UP (ref 3.5–5.3)
PROTHROM AB SERPL-ACNC: 26.6 SEC — HIGH (ref 10.6–13.6)
RBC # BLD: 2.89 M/UL — LOW (ref 3.8–5.2)
RBC # FLD: 17 % — HIGH (ref 10.3–14.5)
SODIUM SERPL-SCNC: 135 MMOL/L — SIGNIFICANT CHANGE UP (ref 135–145)
WBC # BLD: 6.23 K/UL — SIGNIFICANT CHANGE UP (ref 3.8–10.5)
WBC # FLD AUTO: 6.23 K/UL — SIGNIFICANT CHANGE UP (ref 3.8–10.5)

## 2020-10-19 PROCEDURE — 83036 HEMOGLOBIN GLYCOSYLATED A1C: CPT

## 2020-10-19 PROCEDURE — 94010 BREATHING CAPACITY TEST: CPT

## 2020-10-19 PROCEDURE — 85384 FIBRINOGEN ACTIVITY: CPT

## 2020-10-19 PROCEDURE — 84443 ASSAY THYROID STIM HORMONE: CPT

## 2020-10-19 PROCEDURE — 97161 PT EVAL LOW COMPLEX 20 MIN: CPT

## 2020-10-19 PROCEDURE — C1769: CPT

## 2020-10-19 PROCEDURE — 84480 ASSAY TRIIODOTHYRONINE (T3): CPT

## 2020-10-19 PROCEDURE — 86334 IMMUNOFIX E-PHORESIS SERUM: CPT

## 2020-10-19 PROCEDURE — 80048 BASIC METABOLIC PNL TOTAL CA: CPT

## 2020-10-19 PROCEDURE — 84156 ASSAY OF PROTEIN URINE: CPT

## 2020-10-19 PROCEDURE — 87640 STAPH A DNA AMP PROBE: CPT

## 2020-10-19 PROCEDURE — 85027 COMPLETE CBC AUTOMATED: CPT

## 2020-10-19 PROCEDURE — 84165 PROTEIN E-PHORESIS SERUM: CPT

## 2020-10-19 PROCEDURE — 84436 ASSAY OF TOTAL THYROXINE: CPT

## 2020-10-19 PROCEDURE — 71045 X-RAY EXAM CHEST 1 VIEW: CPT

## 2020-10-19 PROCEDURE — 93005 ELECTROCARDIOGRAM TRACING: CPT

## 2020-10-19 PROCEDURE — 99152 MOD SED SAME PHYS/QHP 5/>YRS: CPT

## 2020-10-19 PROCEDURE — 85025 COMPLETE CBC W/AUTO DIFF WBC: CPT

## 2020-10-19 PROCEDURE — 83970 ASSAY OF PARATHORMONE: CPT

## 2020-10-19 PROCEDURE — 82652 VIT D 1 25-DIHYDROXY: CPT

## 2020-10-19 PROCEDURE — 83735 ASSAY OF MAGNESIUM: CPT

## 2020-10-19 PROCEDURE — 93453 R&L HRT CATH W/VENTRICLGRPHY: CPT

## 2020-10-19 PROCEDURE — C1894: CPT

## 2020-10-19 PROCEDURE — 82310 ASSAY OF CALCIUM: CPT

## 2020-10-19 PROCEDURE — 86325 OTHER IMMUNOELECTROPHORESIS: CPT

## 2020-10-19 PROCEDURE — 81003 URINALYSIS AUTO W/O SCOPE: CPT

## 2020-10-19 PROCEDURE — 87641 MR-STAPH DNA AMP PROBE: CPT

## 2020-10-19 PROCEDURE — 99232 SBSQ HOSP IP/OBS MODERATE 35: CPT

## 2020-10-19 PROCEDURE — 82306 VITAMIN D 25 HYDROXY: CPT

## 2020-10-19 PROCEDURE — C1887: CPT

## 2020-10-19 PROCEDURE — 97110 THERAPEUTIC EXERCISES: CPT

## 2020-10-19 PROCEDURE — 84100 ASSAY OF PHOSPHORUS: CPT

## 2020-10-19 PROCEDURE — 85610 PROTHROMBIN TIME: CPT

## 2020-10-19 PROCEDURE — 83880 ASSAY OF NATRIURETIC PEPTIDE: CPT

## 2020-10-19 PROCEDURE — 82803 BLOOD GASES ANY COMBINATION: CPT

## 2020-10-19 PROCEDURE — 84155 ASSAY OF PROTEIN SERUM: CPT

## 2020-10-19 PROCEDURE — 97116 GAIT TRAINING THERAPY: CPT

## 2020-10-19 PROCEDURE — C8929: CPT

## 2020-10-19 PROCEDURE — 99153 MOD SED SAME PHYS/QHP EA: CPT

## 2020-10-19 PROCEDURE — C1889: CPT

## 2020-10-19 PROCEDURE — 86901 BLOOD TYPING SEROLOGIC RH(D): CPT

## 2020-10-19 PROCEDURE — 86850 RBC ANTIBODY SCREEN: CPT

## 2020-10-19 PROCEDURE — 85730 THROMBOPLASTIN TIME PARTIAL: CPT

## 2020-10-19 PROCEDURE — U0003: CPT

## 2020-10-19 PROCEDURE — 94640 AIRWAY INHALATION TREATMENT: CPT

## 2020-10-19 PROCEDURE — 80053 COMPREHEN METABOLIC PANEL: CPT

## 2020-10-19 PROCEDURE — 86769 SARS-COV-2 COVID-19 ANTIBODY: CPT

## 2020-10-19 PROCEDURE — 94660 CPAP INITIATION&MGMT: CPT

## 2020-10-19 PROCEDURE — 86900 BLOOD TYPING SEROLOGIC ABO: CPT

## 2020-10-19 PROCEDURE — 82248 BILIRUBIN DIRECT: CPT

## 2020-10-19 PROCEDURE — 82962 GLUCOSE BLOOD TEST: CPT

## 2020-10-19 PROCEDURE — 83519 RIA NONANTIBODY: CPT

## 2020-10-19 RX ORDER — POTASSIUM CHLORIDE 20 MEQ
0 PACKET (EA) ORAL
Qty: 0 | Refills: 0 | DISCHARGE

## 2020-10-19 RX ORDER — FUROSEMIDE 40 MG
1 TABLET ORAL
Qty: 60 | Refills: 0
Start: 2020-10-19 | End: 2020-11-17

## 2020-10-19 RX ORDER — WARFARIN SODIUM 2.5 MG/1
1 TABLET ORAL
Qty: 0 | Refills: 0 | DISCHARGE

## 2020-10-19 RX ORDER — POLYETHYLENE GLYCOL 3350 17 G/17G
17 POWDER, FOR SOLUTION ORAL
Qty: 0 | Refills: 0 | DISCHARGE
Start: 2020-10-19

## 2020-10-19 RX ORDER — LIDOCAINE 4 G/100G
1 CREAM TOPICAL
Qty: 14 | Refills: 0
Start: 2020-10-19 | End: 2020-11-01

## 2020-10-19 RX ORDER — SENNA PLUS 8.6 MG/1
2 TABLET ORAL
Qty: 0 | Refills: 0 | DISCHARGE
Start: 2020-10-19

## 2020-10-19 RX ORDER — SIMVASTATIN 20 MG/1
0 TABLET, FILM COATED ORAL
Qty: 0 | Refills: 0 | DISCHARGE

## 2020-10-19 RX ORDER — ATORVASTATIN CALCIUM 80 MG/1
1 TABLET, FILM COATED ORAL
Qty: 30 | Refills: 0
Start: 2020-10-19 | End: 2020-11-17

## 2020-10-19 RX ADMIN — Medication 3 MILLILITER(S): at 12:49

## 2020-10-19 RX ADMIN — PANTOPRAZOLE SODIUM 40 MILLIGRAM(S): 20 TABLET, DELAYED RELEASE ORAL at 05:15

## 2020-10-19 RX ADMIN — HEPARIN SODIUM 800 UNIT(S)/HR: 5000 INJECTION INTRAVENOUS; SUBCUTANEOUS at 09:46

## 2020-10-19 RX ADMIN — Medication 25 MILLIGRAM(S): at 05:15

## 2020-10-19 RX ADMIN — Medication 3 MILLILITER(S): at 05:15

## 2020-10-19 NOTE — PROGRESS NOTE ADULT - PROVIDER SPECIALTY LIST ADULT
CT Surgery
Heart Failure
Internal Medicine
Nephrology
Pulmonology
Internal Medicine
Heart Failure
Heart Failure
Internal Medicine

## 2020-10-19 NOTE — PROGRESS NOTE ADULT - SUBJECTIVE AND OBJECTIVE BOX
PULMONARY PROGRESS NOTE    DAMARI GUERRERO  MRN-[id number]    Patient is a 70y old  Female who presents with a chief complaint of mitral regurgitation, heart failure (18 Oct 2020 11:59)      HPI:  she is on 02  no epistaxis  feels well    ROS:   -    ACTIVE MEDICATION LIST:  MEDICATIONS  (STANDING):  albuterol/ipratropium for Nebulization 3 milliLiter(s) Nebulizer every 6 hours  allopurinol 100 milliGRAM(s) Oral daily  atorvastatin 40 milliGRAM(s) Oral at bedtime  heparin  Infusion. 1000 Unit(s)/Hr (10 mL/Hr) IV Continuous <Continuous>  lidocaine   Patch 1 Patch Transdermal every 24 hours  metoprolol tartrate 25 milliGRAM(s) Oral two times a day  pantoprazole    Tablet 40 milliGRAM(s) Oral before breakfast  polyethylene glycol 3350 17 Gram(s) Oral daily  senna 2 Tablet(s) Oral at bedtime    MEDICATIONS  (PRN):  acetaminophen   Tablet .. 650 milliGRAM(s) Oral every 6 hours PRN Temp greater or equal to 38.5C (101.3F), Mild Pain (1 - 3)  heparin   Injectable 9000 Unit(s) IV Push every 6 hours PRN For aPTT less than 40  heparin   Injectable 4000 Unit(s) IV Push every 6 hours PRN For aPTT between 40 - 57      EXAM:  Vital Signs Last 24 Hrs  T(C): 36.7 (19 Oct 2020 11:34), Max: 37.1 (18 Oct 2020 20:24)  T(F): 98 (19 Oct 2020 11:34), Max: 98.8 (18 Oct 2020 20:24)  HR: 61 (19 Oct 2020 11:34) (61 - 94)  BP: 116/66 (19 Oct 2020 11:34) (116/66 - 160/64)  BP(mean): --  RR: 18 (19 Oct 2020 11:34) (18 - 18)  SpO2: 98% (19 Oct 2020 11:34) (96% - 99%)    GENERAL: The patient is awake and alert in no apparent distress.     LUNGS: Clear to auscultation without wheezing, rales or rhonchi; respirations unlabored    HEART: Regular rate and rhythm without murmur.                            7.7    6.23  )-----------( 278      ( 19 Oct 2020 06:00 )             26.5       10-19    135  |  96  |  70<H>  ----------------------------<  110<H>  4.8   |  29  |  1.74<H>    Ca    10.3      19 Oct 2020 06:00       ra  PROBLEM LIST:  70y Female with HEALTH ISSUES - PROBLEM Dx:  Hypercalcemia  Hypercalcemia    MISAEL (acute kidney injury)  MISAEL (acute kidney injury)    Shortness of breath  Shortness of breath    Chronic kidney disease (CKD), stage III (moderate)  Chronic kidney disease (CKD), stage III (moderate)    Tricuspid valve replaced  Tricuspid valve replaced    Mitral valve replaced  Mitral valve replaced    Atrial fibrillation, unspecified type  Atrial fibrillation, unspecified type    Essential hypertension  Essential hypertension    Acute on chronic heart failure with preserved ejection fraction  Acute on chronic heart failure with preserved ejection fraction    Mitral valve stenosis, unspecified etiology  Mitral valve stenosis, unspecified etiology    CHF (congestive heart failure)  CHF (congestive heart failure)    Gout  Gout    MIGUEL (obstructive sleep apnea)  MIGUEL (obstructive sleep apnea)    Atrial fibrillation  Atrial fibrillation       RECS:  continue NIV during sleep  continue with 02 humidification  has my info for outpatient f/u    Please call with any questions.    Irma Eaton DO  [facility] Pulmonary/Sleep Medicine  [phone]

## 2020-10-19 NOTE — PROGRESS NOTE ADULT - SUBJECTIVE AND OBJECTIVE BOX
[facility]   [phone]  RENAL FOLLOW UP NOTE  --------------------------------------------------------------------------------  Chief Complaint:    24 hour events/subjective:    seen earlier.   states breathing is not worse     PAST HISTORY  --------------------------------------------------------------------------------  No significant changes to PMH, PSH, FHx, SHx, unless otherwise noted    ALLERGIES & MEDICATIONS  --------------------------------------------------------------------------------  Allergies    No Known Allergies    Intolerances      Standing Inpatient Medications  albuterol/ipratropium for Nebulization 3 milliLiter(s) Nebulizer every 6 hours  allopurinol 100 milliGRAM(s) Oral daily  atorvastatin 40 milliGRAM(s) Oral at bedtime  heparin  Infusion. 1000 Unit(s)/Hr IV Continuous <Continuous>  lidocaine   Patch 1 Patch Transdermal every 24 hours  metoprolol tartrate 25 milliGRAM(s) Oral two times a day  pantoprazole    Tablet 40 milliGRAM(s) Oral before breakfast  polyethylene glycol 3350 17 Gram(s) Oral daily  senna 2 Tablet(s) Oral at bedtime    PRN Inpatient Medications  acetaminophen   Tablet .. 650 milliGRAM(s) Oral every 6 hours PRN  heparin   Injectable 9000 Unit(s) IV Push every 6 hours PRN  heparin   Injectable 4000 Unit(s) IV Push every 6 hours PRN      REVIEW OF SYSTEMS  --------------------------------------------------------------------------------    Gen: denies fevers/chills,  CVS: denies chest pain/palpitations  Resp: denies SOB/Cough  GI: Denies N/V/Abd pain  : Denies dysuria/oliguria/hematuria    All other systems were reviewed and are negative, except as noted.    VITALS/PHYSICAL EXAM  --------------------------------------------------------------------------------  T(C): 36.7 (10-19-20 @ 11:34), Max: 37.1 (10-18-20 @ 20:24)  HR: 61 (10-19-20 @ 11:34) (61 - 94)  BP: 116/66 (10-19-20 @ 11:34) (116/66 - 125/69)  RR: 18 (10-19-20 @ 11:34) (18 - 18)  SpO2: 98% (10-19-20 @ 11:34) (97% - 98%)  Wt(kg): --        10-18-20 @ 07:01  -  10-19-20 @ 07:00  --------------------------------------------------------  IN: 1290 mL / OUT: 1150 mL / NET: 140 mL    10-19-20 @ 07:01  -  10-19-20 @ 20:20  --------------------------------------------------------  IN: 600 mL / OUT: 1000 mL / NET: -400 mL      Physical Exam:  	  alert and oriented on O2    JVD none   	Pulm: decrease bs  no rales or ronchi or wheezing  	CV: RRR, S1S2; no rub  	Abd: +BS, soft, nontender/nondistended  	: No suprapubic tenderness  	UE: Warm, no cyanosis  no clubbing,  no edema  	LE: Warm, no cyanosis  no clubbing, non pitting no  edema  	Neuro: alert and oriented. speech coherent       LABS/STUDIES  --------------------------------------------------------------------------------              7.7    6.23  >-----------<  278      [10-19-20 @ 06:00]              26.5     135  |  96  |  70  ----------------------------<  110      [10-19-20 @ 06:00]  4.8   |  29  |  1.74        Ca     10.3     [10-19-20 @ 06:00]      PT/INR: PT 26.6 , INR 2.35       [10-19-20 @ 08:30]  PTT: 62.3       [10-19-20 @ 08:30]      Creatinine Trend:  SCr 1.74 [10-19 @ 06:00]  SCr 1.62 [10-18 @ 07:11]  SCr 1.54 [10-17 @ 06:02]  SCr 1.53 [10-16 @ 07:18]  SCr 1.58 [10-15 @ 06:40]                  PTH -- (Ca 10.5)      [10-06-20 @ 06:17]   183  Vitamin D (25OH) 79.0      [10-06-20 @ 06:17]  HbA1c 5.9      [11-11-17 @ 14:00]  TSH 1.92      [09-19-20 @ 10:52]    Immunofixation Serum:   No Monoclonal Band Identified    Reference Range: None Detected      [10-06-20 @ 06:18]  SPEP Interpretation: Polyclonal Gammopathy      [10-06-20 @ 06:18]

## 2020-10-19 NOTE — PROGRESS NOTE ADULT - ASSESSMENT
70 year old woman with chf/ pulmonary hypertension, morbid obesity (BMI 41), HTN severe MR s/p mechanical MVR 1999 ([facility] with Dr. Anderson) and severe TR s/p TVR 2012 ([facility]), , AFib on Coumadin (s/p ablation), CKD 3 (b/l SCr ~1.3-1.6), MIGUEL and gout presented to [facility] with SOB and chest tightness transferred. required diuretics for chf. dx with mitral valve disease. required diuretics  now cr noticed to be elevated to 2.2 range.     1- MISAEL on ckd III   2- hypercalcemia   3- chf   4- anemia     misael in setting of diuretics use likely. given cr improving will start diuretics lasix 40 mg po bid her home dose   consider endo consult for high ca+  cont nebs   d/w chf team

## 2020-10-20 RX ORDER — ATORVASTATIN CALCIUM 80 MG/1
1 TABLET, FILM COATED ORAL
Qty: 30 | Refills: 0
Start: 2020-10-20 | End: 2020-11-18

## 2020-10-20 RX ORDER — FUROSEMIDE 40 MG
1 TABLET ORAL
Qty: 60 | Refills: 0
Start: 2020-10-20 | End: 2020-11-18

## 2020-12-08 NOTE — ED PROVIDER NOTE - TOBACCO USE
Unknown if ever smoked Bilateral Helical Rim Advancement Flap Text: The defect edges were debeveled with a #15 blade scalpel.  Given the location of the defect and the proximity to free margins (helical rim) a bilateral helical rim advancement flap was deemed most appropriate.  Using a sterile surgical marker, the appropriate advancement flaps were drawn incorporating the defect and placing the expected incisions between the helical rim and antihelix where possible.  The area thus outlined was incised through and through with a #15 scalpel blade.  With a skin hook and iris scissors, the flaps were gently and sharply undermined and freed up.

## 2020-12-29 ENCOUNTER — INPATIENT (INPATIENT)
Facility: HOSPITAL | Age: 70
LOS: 7 days | Discharge: ANOTHER HOSPITAL | End: 2021-01-06
Attending: INTERNAL MEDICINE | Admitting: INTERNAL MEDICINE
Payer: MEDICARE

## 2020-12-29 VITALS
RESPIRATION RATE: 19 BRPM | HEART RATE: 68 BPM | TEMPERATURE: 98 F | OXYGEN SATURATION: 100 % | SYSTOLIC BLOOD PRESSURE: 144 MMHG | WEIGHT: 240.08 LBS | HEIGHT: 64 IN | DIASTOLIC BLOOD PRESSURE: 78 MMHG

## 2020-12-29 DIAGNOSIS — Z51.81 ENCOUNTER FOR THERAPEUTIC DRUG LEVEL MONITORING: ICD-10-CM

## 2020-12-29 DIAGNOSIS — R55 SYNCOPE AND COLLAPSE: ICD-10-CM

## 2020-12-29 DIAGNOSIS — J44.9 CHRONIC OBSTRUCTIVE PULMONARY DISEASE, UNSPECIFIED: ICD-10-CM

## 2020-12-29 DIAGNOSIS — Z29.9 ENCOUNTER FOR PROPHYLACTIC MEASURES, UNSPECIFIED: ICD-10-CM

## 2020-12-29 DIAGNOSIS — I48.91 UNSPECIFIED ATRIAL FIBRILLATION: ICD-10-CM

## 2020-12-29 DIAGNOSIS — Z95.4 PRESENCE OF OTHER HEART-VALVE REPLACEMENT: Chronic | ICD-10-CM

## 2020-12-29 DIAGNOSIS — R07.9 CHEST PAIN, UNSPECIFIED: ICD-10-CM

## 2020-12-29 PROBLEM — I10 ESSENTIAL (PRIMARY) HYPERTENSION: Chronic | Status: ACTIVE | Noted: 2020-09-15

## 2020-12-29 PROBLEM — I50.9 HEART FAILURE, UNSPECIFIED: Chronic | Status: ACTIVE | Noted: 2020-09-15

## 2020-12-29 LAB
ALBUMIN SERPL ELPH-MCNC: 3.3 G/DL — SIGNIFICANT CHANGE UP (ref 3.3–5)
ALP SERPL-CCNC: 120 U/L — SIGNIFICANT CHANGE UP (ref 40–120)
ALT FLD-CCNC: 26 U/L — SIGNIFICANT CHANGE UP (ref 12–78)
ANION GAP SERPL CALC-SCNC: 3 MMOL/L — LOW (ref 5–17)
APPEARANCE UR: CLEAR — SIGNIFICANT CHANGE UP
APTT BLD: 44.1 SEC — HIGH (ref 27.5–35.5)
APTT BLD: 45.8 SEC — HIGH (ref 27.5–35.5)
AST SERPL-CCNC: 27 U/L — SIGNIFICANT CHANGE UP (ref 15–37)
BACTERIA # UR AUTO: ABNORMAL
BASOPHILS # BLD AUTO: 0.02 K/UL — SIGNIFICANT CHANGE UP (ref 0–0.2)
BASOPHILS NFR BLD AUTO: 0.3 % — SIGNIFICANT CHANGE UP (ref 0–2)
BILIRUB SERPL-MCNC: 0.3 MG/DL — SIGNIFICANT CHANGE UP (ref 0.2–1.2)
BILIRUB UR-MCNC: NEGATIVE — SIGNIFICANT CHANGE UP
BUN SERPL-MCNC: 40 MG/DL — HIGH (ref 7–23)
CALCIUM SERPL-MCNC: 9.3 MG/DL — SIGNIFICANT CHANGE UP (ref 8.5–10.1)
CHLORIDE SERPL-SCNC: 95 MMOL/L — LOW (ref 96–108)
CK MB BLD-MCNC: <2.3 % — SIGNIFICANT CHANGE UP (ref 0–3.5)
CK MB CFR SERPL CALC: <1 NG/ML — SIGNIFICANT CHANGE UP (ref 0.5–3.6)
CK SERPL-CCNC: 44 U/L — SIGNIFICANT CHANGE UP (ref 26–192)
CO2 SERPL-SCNC: 37 MMOL/L — HIGH (ref 22–31)
COLOR SPEC: YELLOW — SIGNIFICANT CHANGE UP
CREAT SERPL-MCNC: 1.38 MG/DL — HIGH (ref 0.5–1.3)
DIFF PNL FLD: NEGATIVE — SIGNIFICANT CHANGE UP
EOSINOPHIL # BLD AUTO: 0.07 K/UL — SIGNIFICANT CHANGE UP (ref 0–0.5)
EOSINOPHIL NFR BLD AUTO: 1.1 % — SIGNIFICANT CHANGE UP (ref 0–6)
EPI CELLS # UR: SIGNIFICANT CHANGE UP
FLUAV AG NPH QL: SIGNIFICANT CHANGE UP COUNTS
FLUBV AG NPH QL: SIGNIFICANT CHANGE UP COUNTS
GLUCOSE SERPL-MCNC: 110 MG/DL — HIGH (ref 70–99)
GLUCOSE UR QL: NEGATIVE MG/DL — SIGNIFICANT CHANGE UP
HCT VFR BLD CALC: 28.8 % — LOW (ref 34.5–45)
HCT VFR BLD CALC: 29.2 % — LOW (ref 34.5–45)
HGB BLD-MCNC: 7.8 G/DL — LOW (ref 11.5–15.5)
HGB BLD-MCNC: 8.2 G/DL — LOW (ref 11.5–15.5)
IMM GRANULOCYTES NFR BLD AUTO: 0.5 % — SIGNIFICANT CHANGE UP (ref 0–1.5)
INR BLD: 1.65 RATIO — HIGH (ref 0.88–1.16)
INR BLD: 1.7 RATIO — HIGH (ref 0.88–1.16)
KETONES UR-MCNC: NEGATIVE — SIGNIFICANT CHANGE UP
LEUKOCYTE ESTERASE UR-ACNC: ABNORMAL
LYMPHOCYTES # BLD AUTO: 1.22 K/UL — SIGNIFICANT CHANGE UP (ref 1–3.3)
LYMPHOCYTES # BLD AUTO: 19.1 % — SIGNIFICANT CHANGE UP (ref 13–44)
MCHC RBC-ENTMCNC: 25.2 PG — LOW (ref 27–34)
MCHC RBC-ENTMCNC: 25.5 PG — LOW (ref 27–34)
MCHC RBC-ENTMCNC: 27.1 GM/DL — LOW (ref 32–36)
MCHC RBC-ENTMCNC: 28.1 GM/DL — LOW (ref 32–36)
MCV RBC AUTO: 90.7 FL — SIGNIFICANT CHANGE UP (ref 80–100)
MCV RBC AUTO: 92.9 FL — SIGNIFICANT CHANGE UP (ref 80–100)
MONOCYTES # BLD AUTO: 0.63 K/UL — SIGNIFICANT CHANGE UP (ref 0–0.9)
MONOCYTES NFR BLD AUTO: 9.8 % — SIGNIFICANT CHANGE UP (ref 2–14)
NEUTROPHILS # BLD AUTO: 4.43 K/UL — SIGNIFICANT CHANGE UP (ref 1.8–7.4)
NEUTROPHILS NFR BLD AUTO: 69.2 % — SIGNIFICANT CHANGE UP (ref 43–77)
NITRITE UR-MCNC: NEGATIVE — SIGNIFICANT CHANGE UP
NRBC # BLD: 0 /100 WBCS — SIGNIFICANT CHANGE UP (ref 0–0)
NRBC # BLD: 0 /100 WBCS — SIGNIFICANT CHANGE UP (ref 0–0)
NT-PROBNP SERPL-SCNC: 1336 PG/ML — HIGH (ref 0–125)
PH UR: 6 — SIGNIFICANT CHANGE UP (ref 5–8)
PLATELET # BLD AUTO: 357 K/UL — SIGNIFICANT CHANGE UP (ref 150–400)
PLATELET # BLD AUTO: 379 K/UL — SIGNIFICANT CHANGE UP (ref 150–400)
POTASSIUM SERPL-MCNC: 4.4 MMOL/L — SIGNIFICANT CHANGE UP (ref 3.5–5.3)
POTASSIUM SERPL-SCNC: 4.4 MMOL/L — SIGNIFICANT CHANGE UP (ref 3.5–5.3)
PROT SERPL-MCNC: 9 GM/DL — HIGH (ref 6–8.3)
PROT UR-MCNC: NEGATIVE MG/DL — SIGNIFICANT CHANGE UP
PROTHROM AB SERPL-ACNC: 18.7 SEC — HIGH (ref 10.6–13.6)
PROTHROM AB SERPL-ACNC: 19.2 SEC — HIGH (ref 10.6–13.6)
RBC # BLD: 3.1 M/UL — LOW (ref 3.8–5.2)
RBC # BLD: 3.22 M/UL — LOW (ref 3.8–5.2)
RBC # FLD: 16.5 % — HIGH (ref 10.3–14.5)
RBC # FLD: 16.6 % — HIGH (ref 10.3–14.5)
RSV RNA NPH QL NAA+NON-PROBE: SIGNIFICANT CHANGE UP COUNTS
SARS-COV-2 RNA SPEC QL NAA+PROBE: SIGNIFICANT CHANGE UP COUNTS
SODIUM SERPL-SCNC: 135 MMOL/L — SIGNIFICANT CHANGE UP (ref 135–145)
SP GR SPEC: 1.01 — SIGNIFICANT CHANGE UP (ref 1.01–1.02)
TROPONIN I SERPL-MCNC: <.015 NG/ML — SIGNIFICANT CHANGE UP (ref 0.01–0.04)
TROPONIN I SERPL-MCNC: <.015 NG/ML — SIGNIFICANT CHANGE UP (ref 0.01–0.04)
UROBILINOGEN FLD QL: NEGATIVE MG/DL — SIGNIFICANT CHANGE UP
WBC # BLD: 6.4 K/UL — SIGNIFICANT CHANGE UP (ref 3.8–10.5)
WBC # BLD: 9.37 K/UL — SIGNIFICANT CHANGE UP (ref 3.8–10.5)
WBC # FLD AUTO: 6.4 K/UL — SIGNIFICANT CHANGE UP (ref 3.8–10.5)
WBC # FLD AUTO: 9.37 K/UL — SIGNIFICANT CHANGE UP (ref 3.8–10.5)
WBC UR QL: SIGNIFICANT CHANGE UP

## 2020-12-29 PROCEDURE — 99285 EMERGENCY DEPT VISIT HI MDM: CPT

## 2020-12-29 PROCEDURE — 99223 1ST HOSP IP/OBS HIGH 75: CPT

## 2020-12-29 PROCEDURE — 70450 CT HEAD/BRAIN W/O DYE: CPT | Mod: 26

## 2020-12-29 PROCEDURE — 71045 X-RAY EXAM CHEST 1 VIEW: CPT | Mod: 26

## 2020-12-29 PROCEDURE — 93010 ELECTROCARDIOGRAM REPORT: CPT

## 2020-12-29 RX ORDER — METOPROLOL TARTRATE 50 MG
25 TABLET ORAL
Refills: 0 | Status: DISCONTINUED | OUTPATIENT
Start: 2020-12-29 | End: 2020-12-30

## 2020-12-29 RX ORDER — DIGOXIN 250 MCG
0.12 TABLET ORAL DAILY
Refills: 0 | Status: DISCONTINUED | OUTPATIENT
Start: 2020-12-30 | End: 2020-12-30

## 2020-12-29 RX ORDER — ATORVASTATIN CALCIUM 80 MG/1
40 TABLET, FILM COATED ORAL AT BEDTIME
Refills: 0 | Status: DISCONTINUED | OUTPATIENT
Start: 2020-12-29 | End: 2021-01-06

## 2020-12-29 RX ORDER — WARFARIN SODIUM 2.5 MG/1
5 TABLET ORAL ONCE
Refills: 0 | Status: COMPLETED | OUTPATIENT
Start: 2020-12-29 | End: 2020-12-29

## 2020-12-29 RX ORDER — BUDESONIDE AND FORMOTEROL FUMARATE DIHYDRATE 160; 4.5 UG/1; UG/1
2 AEROSOL RESPIRATORY (INHALATION)
Refills: 0 | Status: DISCONTINUED | OUTPATIENT
Start: 2020-12-29 | End: 2021-01-06

## 2020-12-29 RX ORDER — OXYMETAZOLINE HYDROCHLORIDE 0.5 MG/ML
2 SPRAY NASAL ONCE
Refills: 0 | Status: COMPLETED | OUTPATIENT
Start: 2020-12-29 | End: 2020-12-29

## 2020-12-29 RX ORDER — ALLOPURINOL 300 MG
100 TABLET ORAL DAILY
Refills: 0 | Status: DISCONTINUED | OUTPATIENT
Start: 2020-12-29 | End: 2021-01-06

## 2020-12-29 RX ORDER — FUROSEMIDE 40 MG
40 TABLET ORAL
Refills: 0 | Status: DISCONTINUED | OUTPATIENT
Start: 2020-12-29 | End: 2021-01-04

## 2020-12-29 RX ADMIN — ATORVASTATIN CALCIUM 40 MILLIGRAM(S): 80 TABLET, FILM COATED ORAL at 22:29

## 2020-12-29 RX ADMIN — Medication 40 MILLIGRAM(S): at 22:30

## 2020-12-29 RX ADMIN — Medication 25 MILLIGRAM(S): at 22:30

## 2020-12-29 RX ADMIN — WARFARIN SODIUM 5 MILLIGRAM(S): 2.5 TABLET ORAL at 22:29

## 2020-12-29 RX ADMIN — OXYMETAZOLINE HYDROCHLORIDE 2 SPRAY(S): 0.5 SPRAY NASAL at 22:27

## 2020-12-29 RX ADMIN — BUDESONIDE AND FORMOTEROL FUMARATE DIHYDRATE 2 PUFF(S): 160; 4.5 AEROSOL RESPIRATORY (INHALATION) at 22:30

## 2020-12-29 NOTE — ED ADULT NURSE REASSESSMENT NOTE - NS ED NURSE REASSESS COMMENT FT1
Patient sustaining nose bleed at this time. Dr. Alonzo at bedside. Packing placed with ice. patient states this has happened before. Stat labs sent and awaiting resolution

## 2020-12-29 NOTE — H&P ADULT - PROBLEM SELECTOR PLAN 4
chronic, rate controlled  cont home meds with hold paramters  subtherapeutic INR  trend INR to goal 2.5-3,5

## 2020-12-29 NOTE — ED PROVIDER NOTE - OBJECTIVE STATEMENT
70 year old female with PMH of CAD with Mitral and tricuspid valve repair hx  on coumadin, afib s/p ablation, COPD HTN, HLD, Gout, CKD otherwise presenting to ED due to chest pain on left with associated dizziness and SOB for past 1 week without fever/chills but symptoms worsening so pt came in for evaluation.

## 2020-12-29 NOTE — H&P ADULT - PROBLEM SELECTOR PLAN 1
-,may be multifactorial, heart dz with anemia  -check CT head-r/o cva, tia as subtherapeutic INR  -monitor clinicaal course and tele  -consider neuro and cardio consults -,may be multifactorial, heart dz with anemia  -check CT head-r/o cva, tia as subtherapeutic INR  -monitor clinical course and tele  -consider neuro and cardio consults

## 2020-12-29 NOTE — ED ADULT NURSE NOTE - NSIMPLEMENTINTERV_GEN_ALL_ED
Implemented All Universal Safety Interventions:  [place] to call system. Call bell, personal items and telephone within reach. Instruct patient to call for assistance. Room bathroom lighting operational. Non-slip footwear when patient is off stretcher. Physically safe environment: no spills, clutter or unnecessary equipment. Stretcher in lowest position, wheels locked, appropriate side rails in place. POST-OP DIAGNOSIS:  Left ureteral stone 27-Aug-2020 08:50:52  Yuniel Ludwig

## 2020-12-29 NOTE — H&P ADULT - ASSESSMENT
70 year old female with PMH of CAD with Mitral and tricuspid valve repair hx  on coumadin, INR goal 2.5-3.5, afib s/p ablation, COPD on 3L home O2, HTN, HLD, Gout, CKD 3,  otherwise presenting to ED due to chest pain on left with associated dizziness, mild headaches and SOB with exertion admitted with syncope  70 year old female with PMH of CAD with Mitral and tricuspid valve repair hx  on coumadin, INR goal 2.5-3.5, afib s/p ablation, COPD on 3L home O2, HTN, HLD, Gout, CKD 3,  otherwise presenting to ED due to chest pain on left with associated dizziness, mild headaches and SOB with exertion admitted with syncope      IMPROVE VTE Individual Risk Assessment          RISK                                                          Points  [  ] Previous VTE                                                3  [  ] Thrombophilia                                             2  [  ] Lower limb paralysis                                   2        (unable to hold up >15 seconds)    [  ] Current Cancer                                             2         (within 6 months)  [  ] Immobilization > 24 hrs                              1  [  ] ICU/CCU stay > 24 hours                             1  [  ] Age > 60                                                         1    IMPROVE VTE Score: 2

## 2020-12-29 NOTE — H&P ADULT - NSICDXPASTSURGICALHX_GEN_ALL_CORE_FT
PAST SURGICAL HISTORY:  History of mitral valve replacement with mechanical valve     No significant past surgical history     Tricuspid valve replaced mechanical

## 2020-12-29 NOTE — ED ADULT NURSE REASSESSMENT NOTE - NS ED NURSE REASSESS COMMENT FT1
Nose bleed packed by Dr. Dumont and improvement noted. Dr. Alonzo made aware of coumadin ordered and recommended to possible hold. Last INR 1.7 and Dr. Alonzo requested coumadin to be given as ordered

## 2020-12-29 NOTE — ED ADULT NURSE REASSESSMENT NOTE - NS ED NURSE REASSESS COMMENT FT1
Pt expressed concern for food, ED tech unable to gain access to kitchen AM made aware, no food available at this time

## 2020-12-29 NOTE — ED PROVIDER NOTE - PSH
History of mitral valve replacement with mechanical valve    No significant past surgical history    Tricuspid valve replaced  mechanical

## 2020-12-29 NOTE — H&P ADULT - HISTORY OF PRESENT ILLNESS
70 year old female with PMH of CAD with Mitral and tricuspid valve repair hx  on coumadin, afib s/p ablation, COPD HTN, HLD, Gout, CKD otherwise presenting to ED due to chest pain on left with associated dizziness and SOB for past 1 week without fever/chills but symptoms worsening so pt came in for evaluation.    70 year old female with PMH of CAD with Mitral and tricuspid valve repair hx  on coumadin, INR goal 2.5-3.5, afib s/p ablation, COPD on 3L home O2, HTN, HLD, Gout, CKD 3,  otherwise presenting to ED due to chest pain on left with associated dizziness, mild headaches and SOB with exertion for past 1 week without fever/chills but symptoms worsening so pt came in for evaluation.     In the ED, pt had labwork which revealed INR 1.7, trop 0.015, ekg RBBB ?chronic, no st changes.

## 2020-12-29 NOTE — ED PROVIDER NOTE - CARE PLAN
Principal Discharge DX:	Chest pain  Secondary Diagnosis:	Pre-syncope  Secondary Diagnosis:	Dyspnea

## 2020-12-29 NOTE — ED ADULT TRIAGE NOTE - CHIEF COMPLAINT QUOTE
patient c/o of chest pain, headache, dizziness and difficulty breathing started 1 week ago , patient moving all extremities no facial droop clear speech , history of blood transfusion COPD and CHF on Coumadin , c/o of epistaxis on and off started 1 week ago

## 2020-12-29 NOTE — ED PROVIDER NOTE - PMH
Atrial fibrillation  S/P Ablation  CHF (congestive heart failure)    CHF (congestive heart failure)    COPD (chronic obstructive pulmonary disease)    COPD (chronic obstructive pulmonary disease)  on home O2  Gout    HTN (hypertension)    Hypertension    Mitral valve replaced    MIGUEL (obstructive sleep apnea)    Pulmonary hypertension    Tricuspid valve replaced

## 2020-12-29 NOTE — ED PROVIDER NOTE - CLINICAL SUMMARY MEDICAL DECISION MAKING FREE TEXT BOX
Pt with Chest pain and near -syncope symptoms otherwise will need further evaluation for issues - will admit for further care with Dr. Saldivar.

## 2020-12-29 NOTE — H&P ADULT - NSHPPHYSICALEXAM_GEN_ALL_CORE
ICU Vital Signs Last 24 Hrs  T(C): 36.8 (29 Dec 2020 16:03), Max: 36.8 (29 Dec 2020 16:03)  T(F): 98.2 (29 Dec 2020 16:03), Max: 98.2 (29 Dec 2020 16:03)  HR: 68 (29 Dec 2020 16:03) (68 - 68)  BP: 144/78 (29 Dec 2020 16:03) (144/78 - 144/78)  BP(mean): --  ABP: --  ABP(mean): --  RR: 19 (29 Dec 2020 16:03) (19 - 19)  SpO2: 100% (29 Dec 2020 16:03) (100% - 100%)  GENERAL: NAD well-developed  HEAD:  Atraumatic, Normocephalic  EYES: EOMI, PERRLA, conjunctiva and sclera clear  ENMT: No tonsillar erythema, exudates, or enlargement; Moist mucous membranes, Good dentition, No lesions  NECK: Supple, No JVD, Normal thyroid  NERVOUS SYSTEM:  Alert & Oriented X3, Good concentration; Motor Strength 5/5 B/L upper and lower extremities; DTRs 2+ intact and symmetric  CHEST/LUNG: Clear to percussion bilaterally; No rales, rhonchi, wheezing, or rubs  HEART: Regular rate and rhythm; No murmurs, rubs, or gallops  ABDOMEN: Soft, Nontender, Nondistended; Bowel sounds present  EXTREMITIES:  2+ Peripheral Pulses, No clubbing, cyanosis, or edema  LYMPH: No lymphadenopathy   SKIN: No rashes or lesions ICU Vital Signs Last 24 Hrs  T(C): 36.8 (29 Dec 2020 16:03), Max: 36.8 (29 Dec 2020 16:03)  T(F): 98.2 (29 Dec 2020 16:03), Max: 98.2 (29 Dec 2020 16:03)  HR: 68 (29 Dec 2020 16:03) (68 - 68)  BP: 144/78 (29 Dec 2020 16:03) (144/78 - 144/78)  BP(mean): --  ABP: --  ABP(mean): --  RR: 19 (29 Dec 2020 16:03) (19 - 19)  SpO2: 100% (29 Dec 2020 16:03) (100% - 100%)  GENERAL: NAD well-developed  HEAD:  Atraumatic, Normocephalic  EYES: EOMI, PERRLA, conjunctiva and sclera clear  ENMT: No tonsillar erythema, exudates, or enlargement; Moist mucous membranes, Good dentition, No lesions  NECK: Supple, No JVD, Normal thyroid  NERVOUS SYSTEM:  Alert & Oriented X3, Good concentration; Motor Strength 5/5 B/L upper and lower extremities; DTRs 2+ intact and symmetric  CHEST/LUNG: Clear to percussion bilaterally; No rales, rhonchi, wheezing, or rubs  HEART: Regular rate and rhythm; No murmurs, rubs, or gallops  ABDOMEN: Soft,obese Nontender, Nondistended; Bowel sounds present  EXTREMITIES:  2+ Peripheral Pulses, No clubbing, cyanosis, or edema  LYMPH: No lymphadenopathy   SKIN: No rashes or lesions

## 2020-12-29 NOTE — H&P ADULT - NSICDXPASTMEDICALHX_GEN_ALL_CORE_FT
PAST MEDICAL HISTORY:  Atrial fibrillation S/P Ablation    CHF (congestive heart failure)     CHF (congestive heart failure)     COPD (chronic obstructive pulmonary disease)     COPD (chronic obstructive pulmonary disease) on home O2    Gout     HTN (hypertension)     Hypertension     Mitral valve replaced     MIGUEL (obstructive sleep apnea)     Pulmonary hypertension     Tricuspid valve replaced

## 2020-12-29 NOTE — H&P ADULT - NSHPOUTPATIENTPROVIDERS_GEN_ALL_CORE
SBP in 120-140s C/w home enapril  monitor BP PMD-Dr Randle ([address] [place] [address])  Cardio- Dr Lee ([place])

## 2020-12-30 LAB
ALBUMIN SERPL ELPH-MCNC: 3.3 G/DL — SIGNIFICANT CHANGE UP (ref 3.3–5)
ALP SERPL-CCNC: 122 U/L — HIGH (ref 40–120)
ALT FLD-CCNC: 31 U/L — SIGNIFICANT CHANGE UP (ref 12–78)
ANION GAP SERPL CALC-SCNC: 2 MMOL/L — LOW (ref 5–17)
APTT BLD: 44.5 SEC — HIGH (ref 27.5–35.5)
APTT BLD: >200 SEC — SIGNIFICANT CHANGE UP (ref 27.5–35.5)
AST SERPL-CCNC: 13 U/L — LOW (ref 15–37)
BILIRUB SERPL-MCNC: 0.5 MG/DL — SIGNIFICANT CHANGE UP (ref 0.2–1.2)
BLD GP AB SCN SERPL QL: SIGNIFICANT CHANGE UP
BUN SERPL-MCNC: 41 MG/DL — HIGH (ref 7–23)
CALCIUM SERPL-MCNC: 9.3 MG/DL — SIGNIFICANT CHANGE UP (ref 8.5–10.1)
CHLORIDE SERPL-SCNC: 96 MMOL/L — SIGNIFICANT CHANGE UP (ref 96–108)
CK MB CFR SERPL CALC: <1 NG/ML — SIGNIFICANT CHANGE UP (ref 0.5–3.6)
CO2 SERPL-SCNC: 37 MMOL/L — HIGH (ref 22–31)
CREAT SERPL-MCNC: 1.35 MG/DL — HIGH (ref 0.5–1.3)
CULTURE RESULTS: SIGNIFICANT CHANGE UP
DIGOXIN SERPL-MCNC: 1.11 NG/ML — SIGNIFICANT CHANGE UP (ref 0.8–2)
GLUCOSE BLDC GLUCOMTR-MCNC: 111 MG/DL — HIGH (ref 70–99)
GLUCOSE SERPL-MCNC: 102 MG/DL — HIGH (ref 70–99)
HCT VFR BLD CALC: 28.8 % — LOW (ref 34.5–45)
HGB BLD-MCNC: 8 G/DL — LOW (ref 11.5–15.5)
INR BLD: 1.68 RATIO — HIGH (ref 0.88–1.16)
MCHC RBC-ENTMCNC: 25.1 PG — LOW (ref 27–34)
MCHC RBC-ENTMCNC: 27.8 GM/DL — LOW (ref 32–36)
MCV RBC AUTO: 90.3 FL — SIGNIFICANT CHANGE UP (ref 80–100)
NRBC # BLD: 0 /100 WBCS — SIGNIFICANT CHANGE UP (ref 0–0)
PLATELET # BLD AUTO: 393 K/UL — SIGNIFICANT CHANGE UP (ref 150–400)
POTASSIUM SERPL-MCNC: 4.2 MMOL/L — SIGNIFICANT CHANGE UP (ref 3.5–5.3)
POTASSIUM SERPL-SCNC: 4.2 MMOL/L — SIGNIFICANT CHANGE UP (ref 3.5–5.3)
PROT SERPL-MCNC: 9 GM/DL — HIGH (ref 6–8.3)
PROTHROM AB SERPL-ACNC: 19 SEC — HIGH (ref 10.6–13.6)
RBC # BLD: 3.19 M/UL — LOW (ref 3.8–5.2)
RBC # FLD: 16.8 % — HIGH (ref 10.3–14.5)
SARS-COV-2 IGG SERPL QL IA: NEGATIVE — SIGNIFICANT CHANGE UP
SARS-COV-2 IGM SERPL IA-ACNC: 0.06 INDEX — SIGNIFICANT CHANGE UP
SODIUM SERPL-SCNC: 135 MMOL/L — SIGNIFICANT CHANGE UP (ref 135–145)
SPECIMEN SOURCE: SIGNIFICANT CHANGE UP
TROPONIN I SERPL-MCNC: <.015 NG/ML — SIGNIFICANT CHANGE UP (ref 0.01–0.04)
WBC # BLD: 7.7 K/UL — SIGNIFICANT CHANGE UP (ref 3.8–10.5)
WBC # FLD AUTO: 7.7 K/UL — SIGNIFICANT CHANGE UP (ref 3.8–10.5)

## 2020-12-30 PROCEDURE — 93010 ELECTROCARDIOGRAM REPORT: CPT

## 2020-12-30 PROCEDURE — 99223 1ST HOSP IP/OBS HIGH 75: CPT

## 2020-12-30 PROCEDURE — 99233 SBSQ HOSP IP/OBS HIGH 50: CPT

## 2020-12-30 RX ORDER — METOPROLOL TARTRATE 50 MG
25 TABLET ORAL DAILY
Refills: 0 | Status: DISCONTINUED | OUTPATIENT
Start: 2020-12-30 | End: 2020-12-31

## 2020-12-30 RX ORDER — HEPARIN SODIUM 5000 [USP'U]/ML
INJECTION INTRAVENOUS; SUBCUTANEOUS
Qty: 25000 | Refills: 0 | Status: DISCONTINUED | OUTPATIENT
Start: 2020-12-30 | End: 2021-01-06

## 2020-12-30 RX ORDER — HEPARIN SODIUM 5000 [USP'U]/ML
4000 INJECTION INTRAVENOUS; SUBCUTANEOUS EVERY 6 HOURS
Refills: 0 | Status: DISCONTINUED | OUTPATIENT
Start: 2020-12-30 | End: 2021-01-06

## 2020-12-30 RX ORDER — HYDRALAZINE HCL 50 MG
10 TABLET ORAL ONCE
Refills: 0 | Status: COMPLETED | OUTPATIENT
Start: 2020-12-30 | End: 2020-12-30

## 2020-12-30 RX ORDER — INFLUENZA VIRUS VACCINE 15; 15; 15; 15 UG/.5ML; UG/.5ML; UG/.5ML; UG/.5ML
0.5 SUSPENSION INTRAMUSCULAR ONCE
Refills: 0 | Status: DISCONTINUED | OUTPATIENT
Start: 2020-12-30 | End: 2021-01-06

## 2020-12-30 RX ORDER — HEPARIN SODIUM 5000 [USP'U]/ML
9000 INJECTION INTRAVENOUS; SUBCUTANEOUS EVERY 6 HOURS
Refills: 0 | Status: DISCONTINUED | OUTPATIENT
Start: 2020-12-30 | End: 2021-01-06

## 2020-12-30 RX ORDER — HEPARIN SODIUM 5000 [USP'U]/ML
9000 INJECTION INTRAVENOUS; SUBCUTANEOUS ONCE
Refills: 0 | Status: COMPLETED | OUTPATIENT
Start: 2020-12-30 | End: 2020-12-30

## 2020-12-30 RX ADMIN — HEPARIN SODIUM 0 UNIT(S)/HR: 5000 INJECTION INTRAVENOUS; SUBCUTANEOUS at 22:10

## 2020-12-30 RX ADMIN — Medication 10 MILLIGRAM(S): at 02:19

## 2020-12-30 RX ADMIN — Medication 0.12 MILLIGRAM(S): at 06:16

## 2020-12-30 RX ADMIN — BUDESONIDE AND FORMOTEROL FUMARATE DIHYDRATE 2 PUFF(S): 160; 4.5 AEROSOL RESPIRATORY (INHALATION) at 06:17

## 2020-12-30 RX ADMIN — Medication 25 MILLIGRAM(S): at 06:16

## 2020-12-30 RX ADMIN — HEPARIN SODIUM 9000 UNIT(S): 5000 INJECTION INTRAVENOUS; SUBCUTANEOUS at 13:45

## 2020-12-30 RX ADMIN — Medication 40 MILLIGRAM(S): at 06:16

## 2020-12-30 RX ADMIN — Medication 40 MILLIGRAM(S): at 17:52

## 2020-12-30 RX ADMIN — Medication 100 MILLIGRAM(S): at 13:33

## 2020-12-30 RX ADMIN — HEPARIN SODIUM 1900 UNIT(S)/HR: 5000 INJECTION INTRAVENOUS; SUBCUTANEOUS at 13:46

## 2020-12-30 RX ADMIN — HEPARIN SODIUM 1600 UNIT(S)/HR: 5000 INJECTION INTRAVENOUS; SUBCUTANEOUS at 23:10

## 2020-12-30 RX ADMIN — ATORVASTATIN CALCIUM 40 MILLIGRAM(S): 80 TABLET, FILM COATED ORAL at 22:10

## 2020-12-30 RX ADMIN — BUDESONIDE AND FORMOTEROL FUMARATE DIHYDRATE 2 PUFF(S): 160; 4.5 AEROSOL RESPIRATORY (INHALATION) at 17:51

## 2020-12-30 NOTE — ED ADULT NURSE REASSESSMENT NOTE - NS ED NURSE REASSESS COMMENT FT1
Dr. Alonzo made aware of VS. Awaiting order for hydralazine. Pt resting comfortably at this time. No s/s of pain noted.

## 2020-12-30 NOTE — PROGRESS NOTE ADULT - SUBJECTIVE AND OBJECTIVE BOX
Patient is a 70y old  Female who presents with a chief complaint of chest pain (30 Dec 2020 15:21)      INTERVAL HPI/OVERNIGHT EVENTS: Pt was very dizzy today, as per tele nurse-HR went down to 30s at one point then came back to 70s.   Pt complaining of feeling lightheaded when simply sitting in bed, denies palpitations, denies chest pain, diaphoresis.     MEDICATIONS  (STANDING):  allopurinol 100 milliGRAM(s) Oral daily  atorvastatin 40 milliGRAM(s) Oral at bedtime  budesonide 160 MICROgram(s)/formoterol 4.5 MICROgram(s) Inhaler 2 Puff(s) Inhalation two times a day  furosemide    Tablet 40 milliGRAM(s) Oral two times a day  heparin  Infusion.  Unit(s)/Hr (19 mL/Hr) IV Continuous <Continuous>  influenza   Vaccine 0.5 milliLiter(s) IntraMuscular once  metoprolol tartrate 25 milliGRAM(s) Oral daily    MEDICATIONS  (PRN):  heparin   Injectable 9000 Unit(s) IV Push every 6 hours PRN For aPTT less than 40  heparin   Injectable 4000 Unit(s) IV Push every 6 hours PRN For aPTT between 40 - 57      Allergies    No Known Allergies    Intolerances        REVIEW OF SYSTEMS:  CONSTITUTIONAL: No fever, weight loss, or fatigue  EYES: No eye pain, visual disturbances, or discharge  ENMT:  No difficulty hearing, tinnitus, vertigo; No sinus or throat pain  NECK: No pain or stiffness  BREASTS: No pain, masses, or nipple discharge  RESPIRATORY: No cough, wheezing, chills or hemoptysis; No shortness of breath  CARDIOVASCULAR: No chest pain, palpitations, + dizziness, or leg swelling  GASTROINTESTINAL: No abdominal or epigastric pain. No nausea, vomiting, or hematemesis; No diarrhea or constipation. No melena or hematochezia.  GENITOURINARY: No dysuria, frequency, hematuria, or incontinence  NEUROLOGICAL: No headaches, memory loss, loss of strength, numbness, or tremors  SKIN: No itching, burning, rashes, or lesions   LYMPH NODES: No enlarged glands  ENDOCRINE: No heat or cold intolerance; No hair loss  MUSCULOSKELETAL: No joint pain or swelling; No muscle, back, or extremity pain  PSYCHIATRIC: No depression, anxiety, mood swings, or difficulty sleeping  HEME/LYMPH: No easy bruising, or bleeding gums  ALLERGY AND IMMUNOLOGIC: No hives or eczema    Vital Signs Last 24 Hrs  T(C): 37.2 (30 Dec 2020 16:00), Max: 37.2 (30 Dec 2020 02:23)  T(F): 98.9 (30 Dec 2020 16:00), Max: 98.9 (30 Dec 2020 02:23)  HR: 66 (30 Dec 2020 16:00) (39 - 77)  BP: 136/71 (30 Dec 2020 16:00) (136/71 - 225/69)  BP(mean): --  RR: 18 (30 Dec 2020 16:00) (15 - 25)  SpO2: 99% (30 Dec 2020 16:00) (94% - 100%)    PHYSICAL EXAM:  GENERAL: NAD, well-groomed, well-developed  HEAD:  Atraumatic, Normocephalic  EYES: EOMI, PERRLA, conjunctiva and sclera clear  ENMT: No tonsillar erythema, exudates, or enlargement; Moist mucous membranes, Good dentition, No lesions  NECK: Supple, No JVD, Normal thyroid  NERVOUS SYSTEM:  Alert & Oriented X3, Good concentration; Motor Strength 5/5 B/L upper and lower extremities; DTRs 2+ intact and symmetric  CHEST/LUNG: Clear to percussion bilaterally; No rales, rhonchi, wheezing, or rubs  HEART: Regular rate and rhythm; No murmurs, rubs, or gallops  ABDOMEN: Soft, Nontender, Nondistended; Bowel sounds present  EXTREMITIES:  2+ Peripheral Pulses, No clubbing, cyanosis, or +1 edema (as per pt legs are always bit swollen)  LYMPH: No lymphadenopathy noted  SKIN: No rashes or lesions    LABS:                        8.2    9.37  )-----------( 379      ( 29 Dec 2020 22:05 )             29.2     12-30    135  |  96  |  41<H>  ----------------------------<  102<H>  4.2   |  37<H>  |  1.35<H>    Ca    9.3      30 Dec 2020 07:24    TPro  9.0<H>  /  Alb  3.3  /  TBili  0.5  /  DBili  x   /  AST  13<L>  /  ALT  31  /  AlkPhos  122<H>  12-30    PT/INR - ( 30 Dec 2020 07:26 )   PT: 19.0 sec;   INR: 1.68 ratio         PTT - ( 30 Dec 2020 07:26 )  PTT:44.5 sec  Urinalysis Basic - ( 29 Dec 2020 17:21 )    Color: Yellow / Appearance: Clear / S.010 / pH: x  Gluc: x / Ketone: Negative  / Bili: Negative / Urobili: Negative mg/dL   Blood: x / Protein: Negative mg/dL / Nitrite: Negative   Leuk Esterase: Trace / RBC: x / WBC 3-5   Sq Epi: x / Non Sq Epi: Occasional / Bacteria: Moderate      CAPILLARY BLOOD GLUCOSE      POCT Blood Glucose.: 111 mg/dL (30 Dec 2020 12:29)      RADIOLOGY & ADDITIONAL TESTS:    Imaging Personally Reviewed:  [ ] YES  [ ] NO    Consultant(s) Notes Reviewed:  [ ] YES  [ ] NO    Care Discussed with Consultants/Other Providers [ ] YES  [ ] NO

## 2020-12-30 NOTE — CONSULT NOTE ADULT - SUBJECTIVE AND OBJECTIVE BOX
CHIEF COMPLAINT:  Patient is a 70y old  Female who presents with a chief complaint of chest pain (29 Dec 2020 18:30)      HPI:  70 year old female with PMH of CAD with Mitral and tricuspid valve repair hx  on coumadin, INR goal 2.5-3.5, afib s/p ablation, COPD on 3L home O2, HTN, HLD, Gout, CKD 3,  otherwise presenting to ED due to chest pain on left with associated dizziness, mild headaches and SOB with exertion for past 1 week without fever/chills but symptoms worsening so pt came in for evaluation. In the ED, pt had labwork which revealed INR 1.7, trop 0.015, ekg RBBB ?chronic, no st changes.  Had AFib with marked bradycardia and dizziness.    ALLERGIES:  No Known Allergies      Home Medications:  allopurinol 100 mg oral tablet: 1 tab(s) orally once a day (29 Dec 2020 19:24)  budesonide-formoterol 160 mcg-4.5 mcg/inh inhalation aerosol: 2  inhaled 2 times a day (29 Dec 2020 19:24)  Coumadin 5 mg oral tablet: 1 tab(s) orally once a day (29 Dec 2020 19:24)  digoxin 125 mcg (0.125 mg) oral tablet: 1 tab(s) orally once a day (29 Dec 2020 19:24)  Klor-Con M20 oral tablet, extended release: 1 tab(s) orally once a day (29 Dec 2020 19:24)  Lasix 40 mg oral tablet: 2 tab(s) orally once a day (29 Dec 2020 19:24)  metoprolol succinate 25 mg oral tablet, extended release: 1 tab(s) orally once a day (29 Dec 2020 19:24)  ramipril 5 mg oral tablet: 1 tab(s) orally once a day (29 Dec 2020 19:24)  simvastatin 5 mg oral tablet: 1 tab(s) orally once a day (at bedtime) (29 Dec 2020 19:24)    PAST MEDICAL & SURGICAL HISTORY:  COPD (chronic obstructive pulmonary disease)    Hypertension    CHF (congestive heart failure)    Tricuspid valve replaced    Mitral valve replaced    Gout    HTN (hypertension)    CHF (congestive heart failure)    COPD (chronic obstructive pulmonary disease)  on home O2    MIGUEL (obstructive sleep apnea)    Pulmonary hypertension    Atrial fibrillation  S/P Ablation    No significant past surgical history    Tricuspid valve replaced  mechanical    History of mitral valve replacement with mechanical valve          FAMILY HISTORY:  Family history of hypertension (Mother)    Family history of stroke    Family history of hypertension (Father, Sibling)        SOCIAL HISTORY:    REVIEW OF SYSTEMS:  General:  No wt loss, fevers, chills, night sweats  Eyes:  Good vision, no reported pain  ENT:  No sore throat, pain, runny nose, dysphagia  CV:  No pain, palpitations, hypo/hypertension  Resp:  No dyspnea, cough, tachypnea, wheezing  GI:  No pain, nausea, vomiting, diarrhea, constipation  :  No pain, bleeding, incontinence, nocturia  Muscle:  No pain, weakness  Breast:  No pain, abscess, mass, discharge  Neuro:  No weakness, tingling, memory problems  Psych:  No fatigue, insomnia, mood problems, depression  Endocrine:  No polyuria, polydipsia, cold/heat intolerance  Heme:  No petechiae, ecchymosis, easy bruisability  Skin:  No rash, tattoos, scars, edema    PHYSICAL EXAM:  Vital Signs:  Vital Signs Last 24 Hrs  T(C): 37 (30 Dec 2020 12:38), Max: 37.2 (30 Dec 2020 02:23)  T(F): 98.6 (30 Dec 2020 12:38), Max: 98.9 (30 Dec 2020 02:23)  HR: 61 (30 Dec 2020 12:38) (39 - 77)  BP: 172/62 (30 Dec 2020 12:38) (144/78 - 225/69)  BP(mean): --  RR: 18 (30 Dec 2020 12:38) (15 - 25)  SpO2: 97% (30 Dec 2020 12:38) (94% - 100%)  I&O's Summary    30 Dec 2020 07:  -  30 Dec 2020 15:21  --------------------------------------------------------  IN: 240 mL / OUT: 1 mL / NET: 239 mL      I&O's Detail    30 Dec 2020 07:  -  30 Dec 2020 15:21  --------------------------------------------------------  IN:    Oral Fluid: 240 mL  Total IN: 240 mL    OUT:    Voided (mL): 1 mL  Total OUT: 1 mL    Total NET: 239 mL          Tele:     Constitutional: well developed, normal appearance, well groomed, well nourished, no deformities and no acute distress.   Eyes: the conjunctiva exhibited no abnormalities and the eyelids demonstrated no xanthelasmas.   HEENT: normal oral mucosa, no oral pallor and no oral cyanosis.   Neck: normal jugular venous A waves present, normal jugular venous V waves present and no jugular venous ross A waves.   Pulmonary: no respiratory distress, normal respiratory rhythm and effort, no accessory muscle use and lungs were clear to auscultation bilaterally.   Cardiovascular: heart rate and rhythm were normal, normal S1 and S2 and no murmur, gallop, rub, heave or thrill are present.   Abdomen: soft, non-tender, no hepato-splenomegaly and no abdominal mass palpated.   Musculoskeletal: the gait could not be assessed..   Extremities: no clubbing of the fingernails, no localized cyanosis, no petechial hemorrhages and no ischemic changes.   Skin: normal skin color and pigmentation, no rash, no venous stasis, no skin lesions, no skin ulcer and no xanthoma was observed.   Psychiatric: oriented to person, place, and time, the affect was normal, the mood was normal and not feeling anxious.      LABORATORY:                          8.2    9.37  )-----------( 379      ( 29 Dec 2020 22:05 )             29.2     12-30    135  |  96  |  41<H>  ----------------------------<  102<H>  4.2   |  37<H>  |  1.35<H>    Ca    9.3      30 Dec 2020 07:24    TPro  9.0<H>  /  Alb  3.3  /  TBili  0.5  /  DBili  x   /  AST  13<L>  /  ALT  31  /  AlkPhos  122<H>  12-30      CARDIAC MARKERS ( 30 Dec 2020 07:24 )  <.015 ng/mL / x     / x     / x     / <1.0 ng/mL  CARDIAC MARKERS ( 29 Dec 2020 22:14 )  <.015 ng/mL / x     / 44 U/L / x     / <1.0 ng/mL  CARDIAC MARKERS ( 29 Dec 2020 16:56 )  <.015 ng/mL / x     / x     / x     / x          CAPILLARY BLOOD GLUCOSE      POCT Blood Glucose.: 111 mg/dL (30 Dec 2020 12:29)    LIVER FUNCTIONS - ( 30 Dec 2020 07:24 )  Alb: 3.3 g/dL / Pro: 9.0 gm/dL / ALK PHOS: 122 U/L / ALT: 31 U/L / AST: 13 U/L / GGT: x           PT/INR - ( 30 Dec 2020 07:26 )   PT: 19.0 sec;   INR: 1.68 ratio         PTT - ( 30 Dec 2020 07:26 )  PTT:44.5 sec  Urinalysis Basic - ( 29 Dec 2020 17:21 )    Color: Yellow / Appearance: Clear / S.010 / pH: x  Gluc: x / Ketone: Negative  / Bili: Negative / Urobili: Negative mg/dL   Blood: x / Protein: Negative mg/dL / Nitrite: Negative   Leuk Esterase: Trace / RBC: x / WBC 3-5   Sq Epi: x / Non Sq Epi: Occasional / Bacteria: Moderate      BNPSerum Pro-Brain Natriuretic Peptide: 1336 pg/mL (20 @ 16:56)      IMAGING:  < from: 12 Lead ECG (10.16.20 @ 05:07) >  WIDE QRS RHYTHM  RIGHT BUNDLE BRANCH BLOCK  POSSIBLE LATERAL INFARCT , AGE UNDETERMINED  CANNOT RULE OUT INFERIOR INFARCT , AGE UNDETERMINED  WHEN COMPARED WITH ECG OF 10/06/2020  NO SIGNIFICANT CHANGE WAS FOUND    < end of copied text >      < from: CT Head No Cont (20 @ 20:41) >  IMPRESSION: No acute intracranial hemorrhage. Age-indeterminate right basal ganglia lacunar infarct and right cerebellar lacunar infarct. If there is clinical suspicion for acute infarct, consider brain MRI if there is no contraindication.    < end of copied text >      ASSESSMENT:  70 year old female with PMH of CAD with Mitral and tricuspid valve repair hx  on coumadin, INR goal 2.5-3.5, afib s/p ablation, COPD on 3L home O2, HTN, HLD, Gout, CKD 3,  otherwise presenting to ED due to chest pain on left with associated dizziness, mild headaches and SOB with exertion for past 1 week without fever/chills but symptoms worsening so pt came in for evaluation. In the ED, pt had labwork which revealed INR 1.7, trop 0.015, ekg RBBB ?chronic, no st changes.  Had AFib with marked bradycardia and dizziness.    PLAN:     Tele monitoring.    allopurinol 100 milliGRAM(s) Oral daily  atorvastatin 40 milliGRAM(s) Oral at bedtime  budesonide 160 MICROgram(s)/formoterol 4.5 MICROgram(s) Inhaler 2 Puff(s) Inhalation two times a day  digoxin     Tablet 0.125 milliGRAM(s) Oral daily  furosemide    Tablet 40 milliGRAM(s) Oral two times a day  heparin  Infusion.  Unit(s)/Hr (19 mL/Hr) IV Continuous <Continuous>  influenza   Vaccine 0.5 milliLiter(s) IntraMuscular once  metoprolol tartrate 25 milliGRAM(s) Oral two times a day    Montana Barreto MD, FACC, ANGELA, CALVIN, FACP  Director, Heart Failure Services  [facility]  , Department of Cardiology  [facility]     CHIEF COMPLAINT:  Patient is a 70y old  Female who presents with a chief complaint of chest pain (29 Dec 2020 18:30)      HPI:  70 year old female with PMH of CAD with Mitral and tricuspid valve repair hx  on coumadin, INR goal 2.5-3.5, afib s/p ablation, COPD on 3L home O2, HTN, HLD, Gout, CKD 3,  otherwise presenting to ED due to chest pain on left with associated dizziness, mild headaches and SOB with exertion for past 1 week without fever/chills but symptoms worsening so pt came in for evaluation. In the ED, pt had labwork which revealed INR 1.7, trop 0.015, ekg RBBB ?chronic, no st changes.  Had AFib with marked bradycardia and dizziness.    ALLERGIES:  No Known Allergies      Home Medications:  allopurinol 100 mg oral tablet: 1 tab(s) orally once a day (29 Dec 2020 19:24)  budesonide-formoterol 160 mcg-4.5 mcg/inh inhalation aerosol: 2  inhaled 2 times a day (29 Dec 2020 19:24)  Coumadin 5 mg oral tablet: 1 tab(s) orally once a day (29 Dec 2020 19:24)  digoxin 125 mcg (0.125 mg) oral tablet: 1 tab(s) orally once a day (29 Dec 2020 19:24)  Klor-Con M20 oral tablet, extended release: 1 tab(s) orally once a day (29 Dec 2020 19:24)  Lasix 40 mg oral tablet: 2 tab(s) orally once a day (29 Dec 2020 19:24)  metoprolol succinate 25 mg oral tablet, extended release: 1 tab(s) orally once a day (29 Dec 2020 19:24)  ramipril 5 mg oral tablet: 1 tab(s) orally once a day (29 Dec 2020 19:24)  simvastatin 5 mg oral tablet: 1 tab(s) orally once a day (at bedtime) (29 Dec 2020 19:24)    PAST MEDICAL & SURGICAL HISTORY:  COPD (chronic obstructive pulmonary disease)    Hypertension    CHF (congestive heart failure)    Tricuspid valve replaced    Mitral valve replaced    Gout    HTN (hypertension)    CHF (congestive heart failure)    COPD (chronic obstructive pulmonary disease)  on home O2    MIGUEL (obstructive sleep apnea)    Pulmonary hypertension    Atrial fibrillation  S/P Ablation    No significant past surgical history    Tricuspid valve replaced  mechanical    History of mitral valve replacement with mechanical valve          FAMILY HISTORY:  Family history of hypertension (Mother)    Family history of stroke    Family history of hypertension (Father, Sibling)        SOCIAL HISTORY:    REVIEW OF SYSTEMS:  General:  No wt loss, fevers, chills, night sweats  Eyes:  Good vision, no reported pain  ENT:  No sore throat, pain, runny nose, dysphagia  CV:  No pain, palpitations, hypo/hypertension  Resp:  No dyspnea, cough, tachypnea, wheezing  GI:  No pain, nausea, vomiting, diarrhea, constipation  :  No pain, bleeding, incontinence, nocturia  Muscle:  No pain, weakness  Breast:  No pain, abscess, mass, discharge  Neuro:  No weakness, tingling, memory problems  Psych:  No fatigue, insomnia, mood problems, depression  Endocrine:  No polyuria, polydipsia, cold/heat intolerance  Heme:  No petechiae, ecchymosis, easy bruisability  Skin:  No rash, tattoos, scars, edema    PHYSICAL EXAM:  Vital Signs:  Vital Signs Last 24 Hrs  T(C): 37 (30 Dec 2020 12:38), Max: 37.2 (30 Dec 2020 02:23)  T(F): 98.6 (30 Dec 2020 12:38), Max: 98.9 (30 Dec 2020 02:23)  HR: 61 (30 Dec 2020 12:38) (39 - 77)  BP: 172/62 (30 Dec 2020 12:38) (144/78 - 225/69)  RR: 18 (30 Dec 2020 12:38) (15 - 25)  SpO2: 97% (30 Dec 2020 12:38) (94% - 100%)  I&O's Summary    30 Dec 2020 07:  -  30 Dec 2020 15:21  --------------------------------------------------------  IN: 240 mL / OUT: 1 mL / NET: 239 mL      I&O's Detail    30 Dec 2020 07:01  -  30 Dec 2020 15:21  --------------------------------------------------------  IN:    Oral Fluid: 240 mL  Total IN: 240 mL    OUT:    Voided (mL): 1 mL  Total OUT: 1 mL    Total NET: 239 mL          Tele: Afib with slow ventricular response.    Constitutional: well developed, normal appearance, well groomed, well nourished, no deformities and no acute distress.   Eyes: the conjunctiva exhibited no abnormalities and the eyelids demonstrated no xanthelasmas.   HEENT: normal oral mucosa, no oral pallor and no oral cyanosis.   Neck: normal jugular venous A waves present, normal jugular venous V waves present and no jugular venous ross A waves.   Pulmonary: no respiratory distress, normal respiratory rhythm and effort, no accessory muscle use and lungs were clear to auscultation bilaterally.   Cardiovascular: heart rate and rhythm were normal, normal S1 and S2 and no murmur, gallop, rub, heave or thrill are present.   Abdomen: soft, non-tender, no hepato-splenomegaly and no abdominal mass palpated.   Musculoskeletal: the gait could not be assessed..   Extremities: no clubbing of the fingernails, no localized cyanosis, no petechial hemorrhages and no ischemic changes.   Skin: normal skin color and pigmentation, no rash, no venous stasis, no skin lesions, no skin ulcer and no xanthoma was observed.   Psychiatric: oriented to person, place, and time, the affect was normal, the mood was normal and not feeling anxious.      LABORATORY:                          8.2    9.37  )-----------( 379      ( 29 Dec 2020 22:05 )             29.2     12-30    135  |  96  |  41<H>  ----------------------------<  102<H>  4.2   |  37<H>  |  1.35<H>    Ca    9.3      30 Dec 2020 07:24    TPro  9.0<H>  /  Alb  3.3  /  TBili  0.5  /  DBili  x   /  AST  13<L>  /  ALT  31  /  AlkPhos  122<H>  12-30      CARDIAC MARKERS ( 30 Dec 2020 07:24 )  <.015 ng/mL / x     / x     / x     / <1.0 ng/mL  CARDIAC MARKERS ( 29 Dec 2020 22:14 )  <.015 ng/mL / x     / 44 U/L / x     / <1.0 ng/mL  CARDIAC MARKERS ( 29 Dec 2020 16:56 )  <.015 ng/mL / x     / x     / x     / x          CAPILLARY BLOOD GLUCOSE      POCT Blood Glucose.: 111 mg/dL (30 Dec 2020 12:29)    LIVER FUNCTIONS - ( 30 Dec 2020 07:24 )  Alb: 3.3 g/dL / Pro: 9.0 gm/dL / ALK PHOS: 122 U/L / ALT: 31 U/L / AST: 13 U/L / GGT: x           PT/INR - ( 30 Dec 2020 07:26 )   PT: 19.0 sec;   INR: 1.68 ratio         PTT - ( 30 Dec 2020 07:26 )  PTT:44.5 sec  Urinalysis Basic - ( 29 Dec 2020 17:21 )    Color: Yellow / Appearance: Clear / S.010 / pH: x  Gluc: x / Ketone: Negative  / Bili: Negative / Urobili: Negative mg/dL   Blood: x / Protein: Negative mg/dL / Nitrite: Negative   Leuk Esterase: Trace / RBC: x / WBC 3-5   Sq Epi: x / Non Sq Epi: Occasional / Bacteria: Moderate      BNPSerum Pro-Brain Natriuretic Peptide: 1336 pg/mL (20 @ 16:56)      IMAGING:  < from: 12 Lead ECG (10.16.20 @ 05:07) >  WIDE QRS RHYTHM  RIGHT BUNDLE BRANCH BLOCK  POSSIBLE LATERAL INFARCT , AGE UNDETERMINED  CANNOT RULE OUT INFERIOR INFARCT , AGE UNDETERMINED  WHEN COMPARED WITH ECG OF 10/06/2020  NO SIGNIFICANT CHANGE WAS FOUND    < end of copied text >      < from: CT Head No Cont (20 @ 20:41) >  IMPRESSION: No acute intracranial hemorrhage. Age-indeterminate right basal ganglia lacunar infarct and right cerebellar lacunar infarct. If there is clinical suspicion for acute infarct, consider brain MRI if there is no contraindication.    < end of copied text >      ASSESSMENT:  70 year old female with PMH of CAD with Mitral and tricuspid valve repair hx  on coumadin, INR goal 2.5-3.5, afib s/p ablation, COPD on 3L home O2, HTN, HLD, Gout, CKD 3,  otherwise presenting to ED due to chest pain on left with associated dizziness, mild headaches and SOB with exertion for past 1 week without fever/chills but symptoms worsening so pt came in for evaluation. In the ED, pt had labwork which revealed INR 1.7, trop 0.015, ekg RBBB ?chronic, no st changes.  Had AFib with marked bradycardia and dizziness.    PLAN:     Tele monitoring.    allopurinol 100 milliGRAM(s) Oral daily  atorvastatin 40 milliGRAM(s) Oral at bedtime  budesonide 160 MICROgram(s)/formoterol 4.5 MICROgram(s) Inhaler 2 Puff(s) Inhalation two times a day  digoxin     Tablet 0.125 milliGRAM(s) Oral daily  furosemide    Tablet 40 milliGRAM(s) Oral two times a day  heparin  Infusion.  Unit(s)/Hr (19 mL/Hr) IV Continuous <Continuous>  influenza   Vaccine 0.5 milliLiter(s) IntraMuscular once  metoprolol tartrate 25 milliGRAM(s) Oral two times a day    would d/c dig now and lower metoprolol to 25 mg daily and observe.  dig level pending.    Montana Barreto MD, FACC, ANGELA, CALVIN, FACP  Director, Heart Failure Services  [facility]  , Department of Cardiology  [facility]     CHIEF COMPLAINT:  Patient is a 70y old  Female who presents with a chief complaint of chest pain (29 Dec 2020 18:30)      HPI:  70 year old female with PMH of CAD with mechanical prosthetic MVR  and tricuspid valve repair hx  on coumadin, INR goal 2.5-3.5, afib s/p ablation, COPD on 3L home O2, HTN, HLD, Gout, CKD 3,  otherwise presenting to ED due to chest pain on left with associated dizziness, mild headaches and SOB with exertion for past 1 week without fever/chills but symptoms worsening so pt came in for evaluation. In the ED, pt had labwork which revealed INR 1.7, trop 0.015, ekg RBBB ?chronic, no st changes.  Had AFib with marked bradycardia and dizziness.    ALLERGIES:  No Known Allergies      Home Medications:  allopurinol 100 mg oral tablet: 1 tab(s) orally once a day (29 Dec 2020 19:24)  budesonide-formoterol 160 mcg-4.5 mcg/inh inhalation aerosol: 2  inhaled 2 times a day (29 Dec 2020 19:24)  Coumadin 5 mg oral tablet: 1 tab(s) orally once a day (29 Dec 2020 19:24)  digoxin 125 mcg (0.125 mg) oral tablet: 1 tab(s) orally once a day (29 Dec 2020 19:24)  Klor-Con M20 oral tablet, extended release: 1 tab(s) orally once a day (29 Dec 2020 19:24)  Lasix 40 mg oral tablet: 2 tab(s) orally once a day (29 Dec 2020 19:24)  metoprolol succinate 25 mg oral tablet, extended release: 1 tab(s) orally once a day (29 Dec 2020 19:24)  ramipril 5 mg oral tablet: 1 tab(s) orally once a day (29 Dec 2020 19:24)  simvastatin 5 mg oral tablet: 1 tab(s) orally once a day (at bedtime) (29 Dec 2020 19:24)    PAST MEDICAL & SURGICAL HISTORY:  COPD (chronic obstructive pulmonary disease)    Hypertension    CHF (congestive heart failure)    Tricuspid valve replaced    Mitral valve replaced    Gout    HTN (hypertension)    CHF (congestive heart failure)    COPD (chronic obstructive pulmonary disease)  on home O2    MIGUEL (obstructive sleep apnea)    Pulmonary hypertension    Atrial fibrillation  S/P Ablation    No significant past surgical history    Tricuspid valve replaced  mechanical    History of mitral valve replacement with mechanical valve          FAMILY HISTORY:  Family history of hypertension (Mother)    Family history of stroke    Family history of hypertension (Father, Sibling)        SOCIAL HISTORY:    REVIEW OF SYSTEMS:  General:  No wt loss, fevers, chills, night sweats  Eyes:  Good vision, no reported pain  ENT:  No sore throat, pain, runny nose, dysphagia  CV:  No pain, palpitations, hypo/hypertension  Resp:  No dyspnea, cough, tachypnea, wheezing  GI:  No pain, nausea, vomiting, diarrhea, constipation  :  No pain, bleeding, incontinence, nocturia  Muscle:  No pain, weakness  Breast:  No pain, abscess, mass, discharge  Neuro:  No weakness, tingling, memory problems  Psych:  No fatigue, insomnia, mood problems, depression  Endocrine:  No polyuria, polydipsia, cold/heat intolerance  Heme:  No petechiae, ecchymosis, easy bruisability  Skin:  No rash, tattoos, scars, edema    PHYSICAL EXAM:  Vital Signs:  Vital Signs Last 24 Hrs  T(C): 37 (30 Dec 2020 12:38), Max: 37.2 (30 Dec 2020 02:23)  T(F): 98.6 (30 Dec 2020 12:38), Max: 98.9 (30 Dec 2020 02:23)  HR: 61 (30 Dec 2020 12:38) (39 - 77)  BP: 172/62 (30 Dec 2020 12:38) (144/78 - 225/69)  RR: 18 (30 Dec 2020 12:38) (15 - 25)  SpO2: 97% (30 Dec 2020 12:38) (94% - 100%)  I&O's Summary    30 Dec 2020 07:  -  30 Dec 2020 15:21  --------------------------------------------------------  IN: 240 mL / OUT: 1 mL / NET: 239 mL      I&O's Detail    30 Dec 2020 07:01  -  30 Dec 2020 15:21  --------------------------------------------------------  IN:    Oral Fluid: 240 mL  Total IN: 240 mL    OUT:    Voided (mL): 1 mL  Total OUT: 1 mL    Total NET: 239 mL          Tele: Afib with slow ventricular response.    Constitutional: well developed, normal appearance, well groomed, well nourished, no deformities and no acute distress.   Eyes: the conjunctiva exhibited no abnormalities and the eyelids demonstrated no xanthelasmas.   HEENT: normal oral mucosa, no oral pallor and no oral cyanosis.   Neck: normal jugular venous A waves present, normal jugular venous V waves present and no jugular venous ross A waves.   Pulmonary: no respiratory distress, normal respiratory rhythm and effort, no accessory muscle use and lungs were clear to auscultation bilaterally.   Cardiovascular: heart rate and rhythm were normal, normal S1 and S2 and no murmur, gallop, rub, heave or thrill are present.   Abdomen: soft, non-tender, no hepato-splenomegaly and no abdominal mass palpated.   Musculoskeletal: the gait could not be assessed..   Extremities: no clubbing of the fingernails, no localized cyanosis, no petechial hemorrhages and no ischemic changes.   Skin: normal skin color and pigmentation, no rash, no venous stasis, no skin lesions, no skin ulcer and no xanthoma was observed.   Psychiatric: oriented to person, place, and time, the affect was normal, the mood was normal and not feeling anxious.      LABORATORY:                          8.2    9.37  )-----------( 379      ( 29 Dec 2020 22:05 )             29.2     12-30    135  |  96  |  41<H>  ----------------------------<  102<H>  4.2   |  37<H>  |  1.35<H>    Ca    9.3      30 Dec 2020 07:24    TPro  9.0<H>  /  Alb  3.3  /  TBili  0.5  /  DBili  x   /  AST  13<L>  /  ALT  31  /  AlkPhos  122<H>  12-30      CARDIAC MARKERS ( 30 Dec 2020 07:24 )  <.015 ng/mL / x     / x     / x     / <1.0 ng/mL  CARDIAC MARKERS ( 29 Dec 2020 22:14 )  <.015 ng/mL / x     / 44 U/L / x     / <1.0 ng/mL  CARDIAC MARKERS ( 29 Dec 2020 16:56 )  <.015 ng/mL / x     / x     / x     / x          CAPILLARY BLOOD GLUCOSE      POCT Blood Glucose.: 111 mg/dL (30 Dec 2020 12:29)    LIVER FUNCTIONS - ( 30 Dec 2020 07:24 )  Alb: 3.3 g/dL / Pro: 9.0 gm/dL / ALK PHOS: 122 U/L / ALT: 31 U/L / AST: 13 U/L / GGT: x           PT/INR - ( 30 Dec 2020 07:26 )   PT: 19.0 sec;   INR: 1.68 ratio         PTT - ( 30 Dec 2020 07:26 )  PTT:44.5 sec  Urinalysis Basic - ( 29 Dec 2020 17:21 )    Color: Yellow / Appearance: Clear / S.010 / pH: x  Gluc: x / Ketone: Negative  / Bili: Negative / Urobili: Negative mg/dL   Blood: x / Protein: Negative mg/dL / Nitrite: Negative   Leuk Esterase: Trace / RBC: x / WBC 3-5   Sq Epi: x / Non Sq Epi: Occasional / Bacteria: Moderate      BNPSerum Pro-Brain Natriuretic Peptide: 1336 pg/mL (20 @ 16:56)      IMAGING:  < from: 12 Lead ECG (10.16.20 @ 05:07) >  WIDE QRS RHYTHM  RIGHT BUNDLE BRANCH BLOCK  POSSIBLE LATERAL INFARCT , AGE UNDETERMINED  CANNOT RULE OUT INFERIOR INFARCT , AGE UNDETERMINED  WHEN COMPARED WITH ECG OF 10/06/2020  NO SIGNIFICANT CHANGE WAS FOUND    < end of copied text >      < from: CT Head No Cont (20 @ 20:41) >  IMPRESSION: No acute intracranial hemorrhage. Age-indeterminate right basal ganglia lacunar infarct and right cerebellar lacunar infarct. If there is clinical suspicion for acute infarct, consider brain MRI if there is no contraindication.    < end of copied text >    < from: TTE with Doppler (w/Cont) (10.04.20 @ 09:52) >  Conclusions:  1. Mechanical prosthetic mitral valve replacement. Mean  transmitral valve gradient equals 8 mm Hg, which is  elevated even in the setting of a mechanical prosthetic  mitral valve replacement.  2. Aortic valve not well visualized; appears calcified.  Peak transaortic valve gradient equals 10 mm Hg, mean  transaortic valve gradient equals 6 mm Hg, aortic valve  velocity time integral equals 27 cm. Minimal aortic  regurgitation.  3. Normal left ventricular systolic function. No segmental  wall motion abnormalities. Endocardial visualization  enhanced with intravenous injection of Ultrasonic Enhancing  Agent (Definity).  Septal motion consistent with cardiac  surgery.  4. The right ventricle is not well visualized appears  enlarged..    < end of copied text >    ASSESSMENT:  70 year old female with PMH of CAD with mechanical prosthetic MVR and tricuspid valve repair hx  on coumadin, INR goal 2.5-3.5, afib s/p ablation, COPD on 3L home O2, HTN, HLD, Gout, CKD 3,  otherwise presenting to ED due to chest pain on left with associated dizziness, mild headaches and SOB with exertion for past 1 week without fever/chills but symptoms worsening so pt came in for evaluation. In the ED, pt had labwork which revealed INR 1.7, trop 0.015, ekg RBBB ?chronic, no st changes.  Had AFib with marked bradycardia and dizziness.    PLAN:     Tele monitoring.    allopurinol 100 milliGRAM(s) Oral daily  atorvastatin 40 milliGRAM(s) Oral at bedtime  budesonide 160 MICROgram(s)/formoterol 4.5 MICROgram(s) Inhaler 2 Puff(s) Inhalation two times a day  digoxin     Tablet 0.125 milliGRAM(s) Oral daily  furosemide    Tablet 40 milliGRAM(s) Oral two times a day  heparin  Infusion.  Unit(s)/Hr (19 mL/Hr) IV Continuous <Continuous>  influenza   Vaccine 0.5 milliLiter(s) IntraMuscular once  metoprolol tartrate 25 milliGRAM(s) Oral two times a day    would d/c dig now and lower metoprolol to 25 mg daily and observe.  dig level pending.  agree with IV heparin infusion with [facility] mvr.    Montana Barreto MD, FACC, FASE, FASNC, FACP  Director, Heart Failure Services  [facility]  , Department of Cardiology  [facility]     CHIEF COMPLAINT:  Patient is a 70y old  Female who presents with a chief complaint of chest pain (29 Dec 2020 18:30)      HPI:  70 year old female with PMH of CAD with mechanical prosthetic MVR  and tricuspid valve repair hx  on coumadin, INR goal 2.5-3.5, afib s/p ablation, COPD on 3L home O2, HTN, HLD, Gout, CKD 3,  otherwise presenting to ED due to chest pain on left with associated dizziness, mild headaches and SOB with exertion for past 1 week without fever/chills but symptoms worsening so pt came in for evaluation. In the ED, pt had labwork which revealed INR 1.7, trop 0.015, ekg RBBB ?chronic, no st changes.  Had AFib with marked bradycardia and dizziness. Feeling better now.    ALLERGIES:  No Known Allergies      Home Medications:  allopurinol 100 mg oral tablet: 1 tab(s) orally once a day (29 Dec 2020 19:24)  budesonide-formoterol 160 mcg-4.5 mcg/inh inhalation aerosol: 2  inhaled 2 times a day (29 Dec 2020 19:24)  Coumadin 5 mg oral tablet: 1 tab(s) orally once a day (29 Dec 2020 19:24)  digoxin 125 mcg (0.125 mg) oral tablet: 1 tab(s) orally once a day (29 Dec 2020 19:24)  Klor-Con M20 oral tablet, extended release: 1 tab(s) orally once a day (29 Dec 2020 19:24)  Lasix 40 mg oral tablet: 2 tab(s) orally once a day (29 Dec 2020 19:24)  metoprolol succinate 25 mg oral tablet, extended release: 1 tab(s) orally once a day (29 Dec 2020 19:24)  ramipril 5 mg oral tablet: 1 tab(s) orally once a day (29 Dec 2020 19:24)  simvastatin 5 mg oral tablet: 1 tab(s) orally once a day (at bedtime) (29 Dec 2020 19:24)    PAST MEDICAL & SURGICAL HISTORY:  COPD (chronic obstructive pulmonary disease)    Hypertension    CHF (congestive heart failure)    Tricuspid valve replaced    Mitral valve replaced    Gout    HTN (hypertension)    CHF (congestive heart failure)    COPD (chronic obstructive pulmonary disease)  on home O2    MIGUEL (obstructive sleep apnea)    Pulmonary hypertension    Atrial fibrillation  S/P Ablation    No significant past surgical history    Tricuspid valve replaced  mechanical    History of mitral valve replacement with mechanical valve          FAMILY HISTORY:  Family history of hypertension (Mother)    Family history of stroke    Family history of hypertension (Father, Sibling)        SOCIAL HISTORY:  Denies tobacco use.    REVIEW OF SYSTEMS:  General:  No wt loss, fevers, chills, night sweats  Eyes:  Good vision, no reported pain  ENT:  No sore throat, pain, runny nose, dysphagia  CV:  No pain, palpitations, hypo/hypertension  Resp:  No dyspnea, cough, tachypnea, wheezing  GI:  No pain, nausea, vomiting, diarrhea, constipation  :  No pain, bleeding, incontinence, nocturia  Muscle:  No pain, weakness  Breast:  No pain, abscess, mass, discharge  Neuro:  No weakness, tingling, memory problems  Psych:  No fatigue, insomnia, mood problems, depression  Endocrine:  No polyuria, polydipsia, cold/heat intolerance  Heme:  No petechiae, ecchymosis, easy bruisability  Skin:  No rash, edema    PHYSICAL EXAM:  Vital Signs:  Vital Signs Last 24 Hrs  T(C): 37 (30 Dec 2020 12:38), Max: 37.2 (30 Dec 2020 02:23)  T(F): 98.6 (30 Dec 2020 12:38), Max: 98.9 (30 Dec 2020 02:23)  HR: 61 (30 Dec 2020 12:38) (39 - 77)  BP: 172/62 (30 Dec 2020 12:38) (144/78 - 225/69)  RR: 18 (30 Dec 2020 12:38) (15 - 25)  SpO2: 97% (30 Dec 2020 12:38) (94% - 100%)  I&O's Summary    30 Dec 2020 07:  -  30 Dec 2020 15:21  --------------------------------------------------------  IN: 240 mL / OUT: 1 mL / NET: 239 mL      I&O's Detail    30 Dec 2020 07:01  -  30 Dec 2020 15:21  --------------------------------------------------------  IN:    Oral Fluid: 240 mL  Total IN: 240 mL    OUT:    Voided (mL): 1 mL  Total OUT: 1 mL    Total NET: 239 mL          Tele: Afib with slow ventricular response.    Constitutional: well developed, normal appearance, well groomed, well nourished, no deformities and no acute distress.   Eyes: the conjunctiva exhibited no abnormalities and the eyelids demonstrated no xanthelasmas.   HEENT: normal oral mucosa, no oral pallor and no oral cyanosis.   Neck: normal jugular venous A waves present, normal jugular venous V waves present and no jugular venous ross A waves.   Pulmonary: no respiratory distress, normal respiratory rhythm and effort, no accessory muscle use and lungs were clear to auscultation bilaterally.   Cardiovascular: heart rate and rhythm were normal, normal S1 and S2 and no murmur, gallop, rub, heave or thrill are present.   Abdomen: soft, non-tender, morbid obesity.  Musculoskeletal: the gait could not be assessed.  Extremities: no clubbing of the fingernails, no localized cyanosis, no petechial hemorrhages and no ischemic changes.   Skin: normal skin color and pigmentation, no rash, no venous stasis, no skin lesions, no skin ulcer and no xanthoma was observed.   Psychiatric: oriented to person, place, and time, the affect was normal, the mood was normal and not feeling anxious.      LABORATORY:                          8.2    9.37  )-----------( 379      ( 29 Dec 2020 22:05 )             29.2     12-30    135  |  96  |  41<H>  ----------------------------<  102<H>  4.2   |  37<H>  |  1.35<H>    Ca    9.3      30 Dec 2020 07:24    TPro  9.0<H>  /  Alb  3.3  /  TBili  0.5  /  DBili  x   /  AST  13<L>  /  ALT  31  /  AlkPhos  122<H>  12-30      CARDIAC MARKERS ( 30 Dec 2020 07:24 )  <.015 ng/mL / x     / x     / x     / <1.0 ng/mL  CARDIAC MARKERS ( 29 Dec 2020 22:14 )  <.015 ng/mL / x     / 44 U/L / x     / <1.0 ng/mL  CARDIAC MARKERS ( 29 Dec 2020 16:56 )  <.015 ng/mL / x     / x     / x     / x          CAPILLARY BLOOD GLUCOSE      POCT Blood Glucose.: 111 mg/dL (30 Dec 2020 12:29)    LIVER FUNCTIONS - ( 30 Dec 2020 07:24 )  Alb: 3.3 g/dL / Pro: 9.0 gm/dL / ALK PHOS: 122 U/L / ALT: 31 U/L / AST: 13 U/L / GGT: x           PT/INR - ( 30 Dec 2020 07:26 )   PT: 19.0 sec;   INR: 1.68 ratio         PTT - ( 30 Dec 2020 07:26 )  PTT:44.5 sec  Urinalysis Basic - ( 29 Dec 2020 17:21 )    Color: Yellow / Appearance: Clear / S.010 / pH: x  Gluc: x / Ketone: Negative  / Bili: Negative / Urobili: Negative mg/dL   Blood: x / Protein: Negative mg/dL / Nitrite: Negative   Leuk Esterase: Trace / RBC: x / WBC 3-5   Sq Epi: x / Non Sq Epi: Occasional / Bacteria: Moderate      BNPSerum Pro-Brain Natriuretic Peptide: 1336 pg/mL (20 @ 16:56)      IMAGING:  < from: 12 Lead ECG (10.16.20 @ 05:07) >  WIDE QRS RHYTHM  RIGHT BUNDLE BRANCH BLOCK  POSSIBLE LATERAL INFARCT , AGE UNDETERMINED  CANNOT RULE OUT INFERIOR INFARCT , AGE UNDETERMINED  WHEN COMPARED WITH ECG OF 10/06/2020  NO SIGNIFICANT CHANGE WAS FOUND    < end of copied text >      < from: CT Head No Cont (20 @ 20:41) >  IMPRESSION: No acute intracranial hemorrhage. Age-indeterminate right basal ganglia lacunar infarct and right cerebellar lacunar infarct. If there is clinical suspicion for acute infarct, consider brain MRI if there is no contraindication.    < end of copied text >    < from: TTE with Doppler (w/Cont) (10.04.20 @ 09:52) >  Conclusions:  1. Mechanical prosthetic mitral valve replacement. Mean  transmitral valve gradient equals 8 mm Hg, which is  elevated even in the setting of a mechanical prosthetic  mitral valve replacement.  2. Aortic valve not well visualized; appears calcified.  Peak transaortic valve gradient equals 10 mm Hg, mean  transaortic valve gradient equals 6 mm Hg, aortic valve  velocity time integral equals 27 cm. Minimal aortic  regurgitation.  3. Normal left ventricular systolic function. No segmental  wall motion abnormalities. Endocardial visualization  enhanced with intravenous injection of Ultrasonic Enhancing  Agent (Definity).  Septal motion consistent with cardiac  surgery.  4. The right ventricle is not well visualized appears  enlarged..    < end of copied text >    ASSESSMENT:  70 year old female with PMH of CAD with mechanical prosthetic MVR and tricuspid valve repair hx  on coumadin, INR goal 2.5-3.5, afib s/p ablation, COPD on 3L home O2, HTN, HLD, Gout, CKD 3,  otherwise presenting to ED due to chest pain on left with associated dizziness, mild headaches and SOB with exertion for past 1 week without fever/chills but symptoms worsening so pt came in for evaluation. In the ED, pt had labwork which revealed INR 1.7, trop 0.015, ekg RBBB ?chronic, no st changes.  Had AFib with marked bradycardia and dizziness. Feeling better now.    PLAN:     Tele monitoring.    allopurinol 100 milliGRAM(s) Oral daily  atorvastatin 40 milliGRAM(s) Oral at bedtime  budesonide 160 MICROgram(s)/formoterol 4.5 MICROgram(s) Inhaler 2 Puff(s) Inhalation two times a day  digoxin     Tablet 0.125 milliGRAM(s) Oral daily  furosemide    Tablet 40 milliGRAM(s) Oral two times a day  heparin  Infusion.  Unit(s)/Hr (19 mL/Hr) IV Continuous <Continuous>  influenza   Vaccine 0.5 milliLiter(s) IntraMuscular once  metoprolol tartrate 25 milliGRAM(s) Oral two times a day    would d/c dig now and lower metoprolol to 25 mg daily and observe.  dig level pending.  agree with IV heparin infusion with [facility] mvr.  f/u echo pending.    Montana Barreto MD, FACC, FASRUBIN, FASNC, FACP  Director, Heart Failure Services  [facility]  , Department of Cardiology  [facility]

## 2020-12-30 NOTE — PROGRESS NOTE ADULT - ASSESSMENT
70 year old female with PMH of CAD with Mitral and tricuspid valve repair hx  on coumadin, INR goal 2.5-3.5, afib s/p ablation, COPD on 3L home O2, HTN, HLD, Gout, CKD 3,  otherwise presenting to ED due to chest pain on left with associated dizziness, mild headaches and SOB with exertion for past 1 week without fever/chills but symptoms worsening so pt came in for evaluation.       Pre-syncope.    -Possibly multifactorial  -Afib/anemia  -Cardiology following  -as per Cardiology recs  -continue telemetry     Chronic Atrial fibrillation  -low HR today  -d/c digoxin as per cards, awaiting dig level  -lowered metoprolol  -Heparin drip on top of Coumadin 5mg at home, close monitoring of INR         CAD with mechanical prosthetic MVR and tricuspid valve repair  INR subtheraputic (norm 2.5-3.5)   -started on Heparin drip        Chest pain.  Plan: -r/o acs  -trend trops x 3 negative  -pt denies chest pain currently      COPD    cont home meds.     Anemia  -continue to monitor closely H/H       DVT ppx-Coumadin and Heparin drip

## 2020-12-31 LAB
ANION GAP SERPL CALC-SCNC: 6 MMOL/L — SIGNIFICANT CHANGE UP (ref 5–17)
APTT BLD: 152 SEC — CRITICAL HIGH (ref 27.5–35.5)
APTT BLD: 80.7 SEC — HIGH (ref 27.5–35.5)
BUN SERPL-MCNC: 42 MG/DL — HIGH (ref 7–23)
CALCIUM SERPL-MCNC: 9.8 MG/DL — SIGNIFICANT CHANGE UP (ref 8.5–10.1)
CHLORIDE SERPL-SCNC: 98 MMOL/L — SIGNIFICANT CHANGE UP (ref 96–108)
CO2 SERPL-SCNC: 33 MMOL/L — HIGH (ref 22–31)
CREAT SERPL-MCNC: 1.49 MG/DL — HIGH (ref 0.5–1.3)
GLUCOSE SERPL-MCNC: 118 MG/DL — HIGH (ref 70–99)
HCT VFR BLD CALC: 28.2 % — LOW (ref 34.5–45)
HGB BLD-MCNC: 8 G/DL — LOW (ref 11.5–15.5)
INR BLD: 1.71 RATIO — HIGH (ref 0.88–1.16)
MAGNESIUM SERPL-MCNC: 2.2 MG/DL — SIGNIFICANT CHANGE UP (ref 1.6–2.6)
MCHC RBC-ENTMCNC: 24.8 PG — LOW (ref 27–34)
MCHC RBC-ENTMCNC: 28.4 GM/DL — LOW (ref 32–36)
MCV RBC AUTO: 87.6 FL — SIGNIFICANT CHANGE UP (ref 80–100)
NRBC # BLD: 0 /100 WBCS — SIGNIFICANT CHANGE UP (ref 0–0)
PHOSPHATE SERPL-MCNC: 2.8 MG/DL — SIGNIFICANT CHANGE UP (ref 2.5–4.5)
PLATELET # BLD AUTO: 396 K/UL — SIGNIFICANT CHANGE UP (ref 150–400)
POTASSIUM SERPL-MCNC: 4 MMOL/L — SIGNIFICANT CHANGE UP (ref 3.5–5.3)
POTASSIUM SERPL-SCNC: 4 MMOL/L — SIGNIFICANT CHANGE UP (ref 3.5–5.3)
PROTHROM AB SERPL-ACNC: 19.3 SEC — HIGH (ref 10.6–13.6)
RBC # BLD: 3.22 M/UL — LOW (ref 3.8–5.2)
RBC # FLD: 16.6 % — HIGH (ref 10.3–14.5)
SODIUM SERPL-SCNC: 137 MMOL/L — SIGNIFICANT CHANGE UP (ref 135–145)
WBC # BLD: 7.76 K/UL — SIGNIFICANT CHANGE UP (ref 3.8–10.5)
WBC # FLD AUTO: 7.76 K/UL — SIGNIFICANT CHANGE UP (ref 3.8–10.5)

## 2020-12-31 PROCEDURE — 99233 SBSQ HOSP IP/OBS HIGH 50: CPT

## 2020-12-31 PROCEDURE — 93306 TTE W/DOPPLER COMPLETE: CPT | Mod: 26

## 2020-12-31 RX ORDER — WARFARIN SODIUM 2.5 MG/1
5 TABLET ORAL ONCE
Refills: 0 | Status: COMPLETED | OUTPATIENT
Start: 2020-12-31 | End: 2020-12-31

## 2020-12-31 RX ORDER — ALPRAZOLAM 0.25 MG
0.25 TABLET ORAL ONCE
Refills: 0 | Status: DISCONTINUED | OUTPATIENT
Start: 2020-12-31 | End: 2020-12-31

## 2020-12-31 RX ORDER — METOPROLOL TARTRATE 50 MG
25 TABLET ORAL DAILY
Refills: 0 | Status: DISCONTINUED | OUTPATIENT
Start: 2020-12-31 | End: 2021-01-05

## 2020-12-31 RX ADMIN — Medication 40 MILLIGRAM(S): at 18:43

## 2020-12-31 RX ADMIN — ATORVASTATIN CALCIUM 40 MILLIGRAM(S): 80 TABLET, FILM COATED ORAL at 21:52

## 2020-12-31 RX ADMIN — Medication 40 MILLIGRAM(S): at 06:16

## 2020-12-31 RX ADMIN — BUDESONIDE AND FORMOTEROL FUMARATE DIHYDRATE 2 PUFF(S): 160; 4.5 AEROSOL RESPIRATORY (INHALATION) at 06:17

## 2020-12-31 RX ADMIN — Medication 0.25 MILLIGRAM(S): at 21:51

## 2020-12-31 RX ADMIN — HEPARIN SODIUM 0 UNIT(S)/HR: 5000 INJECTION INTRAVENOUS; SUBCUTANEOUS at 06:59

## 2020-12-31 RX ADMIN — HEPARIN SODIUM 1300 UNIT(S)/HR: 5000 INJECTION INTRAVENOUS; SUBCUTANEOUS at 09:36

## 2020-12-31 RX ADMIN — Medication 25 MILLIGRAM(S): at 06:16

## 2020-12-31 RX ADMIN — WARFARIN SODIUM 5 MILLIGRAM(S): 2.5 TABLET ORAL at 21:52

## 2020-12-31 RX ADMIN — HEPARIN SODIUM 1300 UNIT(S)/HR: 5000 INJECTION INTRAVENOUS; SUBCUTANEOUS at 18:43

## 2020-12-31 RX ADMIN — Medication 100 MILLIGRAM(S): at 11:19

## 2020-12-31 RX ADMIN — BUDESONIDE AND FORMOTEROL FUMARATE DIHYDRATE 2 PUFF(S): 160; 4.5 AEROSOL RESPIRATORY (INHALATION) at 19:27

## 2020-12-31 NOTE — DIETITIAN INITIAL EVALUATION ADULT. - PERSON TAUGHT/METHOD
verbal instruction/written material/individual instruction/patient instructed/teach back - (Patient repeats in own words)

## 2020-12-31 NOTE — PROGRESS NOTE ADULT - ASSESSMENT
70 year old female with PMH of CAD with Mitral and tricuspid valve repair hx  on coumadin, INR goal 2.5-3.5, afib s/p ablation, COPD on 3L home O2, HTN, HLD, Gout, CKD 3,  otherwise presenting to ED due to chest pain on left with associated dizziness, mild headaches and SOB with exertion for past 1 week without fever/chills but symptoms worsening so pt came in for evaluation.       Pre-syncope.    -Possibly multifactorial  -Afib/anemia  -orthostatics-     -Cardiology following  -continue telemetry       Chronic Atrial fibrillation  -HR normal, BP stable  -dig level-1.11 wnl  -lowered metoprolol to 25mg as per Cards   -Heparin drip on top of Coumadin 5mg at home, close monitoring of INR         CAD with mechanical prosthetic MVR and tricuspid valve repair  INR subtheraputic (norm 2.5-3.5)   -continue Heparin drip and Coumadin       Chest pain.  Plan: -r/o acs  -trend trops x 3 negative  -pt denies chest pain currently      COPD    cont home meds.     Anemia  -continue to monitor closely H/H -continues to be stable      DVT ppx-Coumadin and Heparin drip

## 2020-12-31 NOTE — DIETITIAN INITIAL EVALUATION ADULT. - DIET TYPE
Suplena x 1/day (provides 425 kcal, 11 g protein)/DASH/TLC (sodium and cholesterol restricted diet)/supplement (specify)

## 2020-12-31 NOTE — PROGRESS NOTE ADULT - SUBJECTIVE AND OBJECTIVE BOX
Patient is a 70y old  Female who presents with a chief complaint of chest pain (30 Dec 2020 15:21)      INTERVAL HPI/OVERNIGHT EVENTS:   Vitals stable     MEDICATIONS  (STANDING):  allopurinol 100 milliGRAM(s) Oral daily  atorvastatin 40 milliGRAM(s) Oral at bedtime  budesonide 160 MICROgram(s)/formoterol 4.5 MICROgram(s) Inhaler 2 Puff(s) Inhalation two times a day  furosemide    Tablet 40 milliGRAM(s) Oral two times a day  heparin  Infusion.  Unit(s)/Hr (19 mL/Hr) IV Continuous <Continuous>  influenza   Vaccine 0.5 milliLiter(s) IntraMuscular once  metoprolol tartrate 25 milliGRAM(s) Oral daily    MEDICATIONS  (PRN):  heparin   Injectable 9000 Unit(s) IV Push every 6 hours PRN For aPTT less than 40  heparin   Injectable 4000 Unit(s) IV Push every 6 hours PRN For aPTT between 40 - 57      Allergies    No Known Allergies    Intolerances        REVIEW OF SYSTEMS:  CONSTITUTIONAL: No fever, weight loss, or fatigue  EYES: No eye pain, visual disturbances, or discharge  ENMT:  No difficulty hearing, tinnitus, vertigo; No sinus or throat pain  NECK: No pain or stiffness  BREASTS: No pain, masses, or nipple discharge  RESPIRATORY: No cough, wheezing, chills or hemoptysis; No shortness of breath  CARDIOVASCULAR: No chest pain, palpitations, + dizziness, or leg swelling  GASTROINTESTINAL: No abdominal or epigastric pain. No nausea, vomiting, or hematemesis; No diarrhea or constipation. No melena or hematochezia.  GENITOURINARY: No dysuria, frequency, hematuria, or incontinence  NEUROLOGICAL: No headaches, memory loss, loss of strength, numbness, or tremors  SKIN: No itching, burning, rashes, or lesions   LYMPH NODES: No enlarged glands  ENDOCRINE: No heat or cold intolerance; No hair loss  MUSCULOSKELETAL: No joint pain or swelling; No muscle, back, or extremity pain  PSYCHIATRIC: No depression, anxiety, mood swings, or difficulty sleeping  HEME/LYMPH: No easy bruising, or bleeding gums  ALLERGY AND IMMUNOLOGIC: No hives or eczema    ICU Vital Signs Last 24 Hrs  T(C): 37.1 (31 Dec 2020 04:10), Max: 37.2 (30 Dec 2020 16:00)  T(F): 98.8 (31 Dec 2020 04:10), Max: 98.9 (30 Dec 2020 16:00)  HR: 93 (31 Dec 2020 04:10) (39 - 93)  BP: 154/82 (31 Dec 2020 04:10) (136/71 - 172/62)  BP(mean): --  ABP: --  ABP(mean): --  RR: 18 (31 Dec 2020 04:10) (18 - 20)  SpO2: 96% (31 Dec 2020 04:10) (95% - 100%)    PHYSICAL EXAM:  GENERAL: NAD, well-groomed, well-developed  HEAD:  Atraumatic, Normocephalic  EYES: EOMI, PERRLA, conjunctiva and sclera clear  ENMT: No tonsillar erythema, exudates, or enlargement; Moist mucous membranes, Good dentition, No lesions  NECK: Supple, No JVD, Normal thyroid  NERVOUS SYSTEM:  Alert & Oriented X3, Good concentration; Motor Strength 5/5 B/L upper and lower extremities; DTRs 2+ intact and symmetric  CHEST/LUNG: Clear to percussion bilaterally; No rales, rhonchi, wheezing, or rubs  HEART: Regular rate and rhythm; No murmurs, rubs, or gallops  ABDOMEN: Soft, Nontender, Nondistended; Bowel sounds present  EXTREMITIES:  2+ Peripheral Pulses, No clubbing, cyanosis, or +1 edema (as per pt legs are always bit swollen)  LYMPH: No lymphadenopathy noted  SKIN: No rashes or lesions                            8.0    7.76  )-----------( 396      ( 31 Dec 2020 06:21 )             28.2     12-    137  |  98  |  42<H>  ----------------------------<  118<H>  4.0   |  33<H>  |  1.49<H>    Ca    9.8      31 Dec 2020 06:21  Phos  2.8     12-  Mg     2.2     12-    TPro  9.0<H>  /  Alb  3.3  /  TBili  0.5  /  DBili  x   /  AST  13<L>  /  ALT  31  /  AlkPhos  122<H>  12-30    PT/INR - ( 31 Dec 2020 06:21 )   PT: 19.3 sec;   INR: 1.71 ratio         PTT - ( 31 Dec 2020 06:21 )  PTT:152.0 sec  Urinalysis Basic - ( 29 Dec 2020 17:21 )    Color: Yellow / Appearance: Clear / S.010 / pH: x  Gluc: x / Ketone: Negative  / Bili: Negative / Urobili: Negative mg/dL   Blood: x / Protein: Negative mg/dL / Nitrite: Negative   Leuk Esterase: Trace / RBC: x / WBC 3-5   Sq Epi: x / Non Sq Epi: Occasional / Bacteria: Moderate

## 2020-12-31 NOTE — DIETITIAN INITIAL EVALUATION ADULT. - PERTINENT LABORATORY DATA
12-31 Na137 mmol/L Glu 118 mg/dL<H> K+ 4.0 mmol/L Cr  1.49 mg/dL<H> BUN 42 mg/dL<H> 12-31 Phos 2.8 mg/dL 12-30 Alb 3.3 g/dL

## 2020-12-31 NOTE — DIETITIAN INITIAL EVALUATION ADULT. - PROBLEM SELECTOR PLAN 1
-,may be multifactorial, heart dz with anemia  -check CT head-r/o cva, tia as subtherapeutic INR  -monitor clinical course and tele  -consider neuro and cardio consults

## 2020-12-31 NOTE — CHART NOTE - NSCHARTNOTEFT_GEN_A_CORE
House- Medicine NP:    Asked by Maria Elena NP to administer definity for echo. Patient is a 70y old  Female who presents with a chief complaint of chest pain (31 Dec 2020 13:57)      T(F): 98.6 (12-31-20 @ 10:32)  HR: 91 (12-31-20 @ 10:32)  BP: 126/62 (12-31-20 @ 10:32)  RR: 20 (12-31-20 @ 10:32)  SpO2: 98% (12-31-20 @ 10:32)    Risks/ benefits explained to pt. Consent for procedure obtained. Witnessed by RN. Definity administed via peripheral IV per protocol. Pt tolerated well.

## 2020-12-31 NOTE — DIETITIAN NUTRITION RISK NOTIFICATION - TREATMENT: THE FOLLOWING DIET HAS BEEN RECOMMENDED
Diet, DASH/TLC:   Sodium & Cholesterol Restricted  Supplement Feeding Modality:  Oral  Suplena Cans or Servings Per Day:  1       Frequency:  Daily (12-31-20 @ 16:19) [Pending Verification By Attending]  Diet, DASH/TLC:   Sodium & Cholesterol Restricted (12-29-20 @ 18:42) [Active]

## 2020-12-31 NOTE — DIETITIAN INITIAL EVALUATION ADULT. - OTHER CALCULATIONS
Ht (cm):   162.6    Wt (kg):  110.5 kg (12-)      IBW:   54.4 kg       %IBW:   200%     UBW:    stable      %UBW: stable

## 2020-12-31 NOTE — PROGRESS NOTE ADULT - SUBJECTIVE AND OBJECTIVE BOX
Patient is a 70y old  Female who presents with a chief complaint of chest pain (31 Dec 2020 09:24)    PAST MEDICAL & SURGICAL HISTORY:    CHF (congestive heart failure)    Gout    HTN (hypertension)    COPD (chronic obstructive pulmonary disease)  on home O2    MIGUEL (obstructive sleep apnea)    Pulmonary hypertension    Atrial fibrillation  S/P Ablation    Tricuspid valve repair    History of mitral valve replacement with mechanical valve    INTERVAL HISTORY: resting in bed   	  MEDICATIONS:  MEDICATIONS  (STANDING):  allopurinol 100 milliGRAM(s) Oral daily  atorvastatin 40 milliGRAM(s) Oral at bedtime  budesonide 160 MICROgram(s)/formoterol 4.5 MICROgram(s) Inhaler 2 Puff(s) Inhalation two times a day  furosemide    Tablet 40 milliGRAM(s) Oral two times a day  heparin  Infusion.  Unit(s)/Hr (19 mL/Hr) IV Continuous <Continuous>  influenza   Vaccine 0.5 milliLiter(s) IntraMuscular once  metoprolol tartrate 25 milliGRAM(s) Oral daily  warfarin 5 milliGRAM(s) Oral once    MEDICATIONS  (PRN):  heparin   Injectable 9000 Unit(s) IV Push every 6 hours PRN For aPTT less than 40  heparin   Injectable 4000 Unit(s) IV Push every 6 hours PRN For aPTT between 40 - 57    Vitals:  T(F): 98.6 (12-31-20 @ 10:32), Max: 98.9 (12-30-20 @ 16:00)  HR: 91 (12-31-20 @ 10:32) (66 - 93)  BP: 126/62 (12-31-20 @ 10:32) (126/62 - 154/82)  RR: 20 (12-31-20 @ 10:32) (18 - 20)  SpO2: 98% (12-31-20 @ 10:32) (95% - 99%)    12-30 @ 07:01  -  12-31 @ 07:00  --------------------------------------------------------  IN:    Oral Fluid: 240 mL  Total IN: 240 mL    OUT:    Voided (mL): 1 mL  Total OUT: 1 mL    Total NET: 239 mL    Weight (kg): 108.9 (12-29 @ 16:03)  BMI (kg/m2): 41.2 (12-29 @ 16:03)    PHYSICAL EXAM:  Neuro: Awake, responsive  CV: S1 S2 irregular + mechanical click    Lungs: CTABL  GI: Soft, BS +, ND, NT  Extremities: No edema    TELEMETRY: atrial fibrillation    RADIOLOGY: < from: Xray Chest 1 View- PORTABLE-Urgent (Xray Chest 1 View- PORTABLE-Urgent .) (12.29.20 @ 16:57) >   Heart enlargement, heart surgery, and mild central CHF.    < end of copied text >    DIAGNOSTIC TESTING:    [x ] Echocardiogram: < from: TTE with Doppler (w/Cont) (10.04.20 @ 09:52) >  Mitral Valve: Mechanical prosthetic mitral valve  replacement. Mean transmitral valve gradient equals 8 mm  Hg, which is elevated even in the setting of a mechanical  prosthetic mitral valve replacement.  Aortic Valve/Aorta: Aortic valve not well visualized;  appears calcified. Peak transaortic valve gradient equals  10 mm Hg, mean transaortic valve gradient equals 6 mm Hg,  aortic valve velocity time integral equals 27 cm. Minimal  aortic regurgitation.  Peak left ventricular outflow tract  gradient equals 3 mm Hg, mean gradient is equal to 2 mm Hg,  LVOT velocity timeintegral equals 13 cm.  Aortic Root: 3.4 cm.  LVOT diameter: 2.2 cm.  Left Atrium: Left atrial enlargement  Left Ventricle: Normal left ventricular systolic function.  No segmental wall motion abnormalities. Endocardial  visualization enhanced with intravenous injection of  Ultrasonic Enhancing Agent (Definity).  Septal motion  consistent with cardiac surgery.  Right Heart: Right atrial enlargement The right ventricle  is not well visualized appears enlarged.. Normal tricuspid  valve. Minimal tricuspid regurgitation.  Pericardium/Pleura: Normal pericardium with no pericardial  effusion.  Hemodynamic: Estimated right atrial pressure is 8 mm Hg.  ------------------------------------------------------------------------  Conclusions:  1. Mechanical prosthetic mitral valve replacement. Mean  transmitral valve gradient equals 8 mm Hg, which is  elevated even in the setting of a mechanical prosthetic  mitral valve replacement.  2. Aortic valve not well visualized; appears calcified.  Peak transaortic valve gradient equals 10 mm Hg, mean  transaortic valve gradient equals 6 mm Hg, aortic valve  velocity time integral equals 27 cm. Minimal aortic  regurgitation.  3. Normal left ventricular systolic function. No segmental  wall motion abnormalities. Endocardial visualization  enhanced with intravenous injection of Ultrasonic Enhancing  Agent (Definity).  Septal motion consistent with cardiac  surgery.  4. The right ventricle is not well visualized appears  enlarged..    < end of copied text >    LABS:	 	    CARDIAC MARKERS:  Troponin I, Serum: <.015 ng/mL (12-30 @ 07:24)  Troponin I, Serum: <.015 ng/mL (12-29 @ 22:14)  Troponin I, Serum: <.015 ng/mL (12-29 @ 16:56)    31 Dec 2020 06:21    137    |  98     |  42     ----------------------------<  118    4.0     |  33     |  1.49   30 Dec 2020 07:24    135    |  96     |  41     ----------------------------<  102    4.2     |  37     |  1.35   29 Dec 2020 16:56    135    |  95     |  40     ----------------------------<  110    4.4     |  37     |  1.38     Ca    9.8        31 Dec 2020 06:21  Phos  2.8       31 Dec 2020 06:21  Mg     2.2       31 Dec 2020 06:21    TPro  9.0    /  Alb  3.3    /  TBili  0.5    /  DBili  x      /  AST  13     /  ALT  31     /  AlkPhos  122    30 Dec 2020 07:24                          8.0    7.76  )-----------( 396      ( 31 Dec 2020 06:21 )             28.2 ,                       8.0    7.70  )-----------( 393      ( 30 Dec 2020 21:27 )             28.8 ,                       8.2    9.37  )-----------( 379      ( 29 Dec 2020 22:05 )             29.2 ,                       7.8    6.40  )-----------( 357      ( 29 Dec 2020 16:56 )             28.8   proBNP: Serum Pro-Brain Natriuretic Peptide: 1336 pg/mL (12-29 @ 16:56)    PT/PTT- ( 31 Dec 2020 06:21 )   PT: 19.3 sec;  PTT: 152.0 sec    PT/PTT- ( 30 Dec 2020 21:27 )   PT: x    ;  PTT: >200.0 sec    INR: 1.71 ratio (12-31 @ 06:21)  INR: 1.68 ratio (12-30 @ 07:26)  INR: 1.65 ratio (12-29 @ 22:59)  INR: 1.70 ratio (12-29 @ 16:56)           Patient is a 70y old  Female who presents with a chief complaint of chest pain (31 Dec 2020 09:24)    PAST MEDICAL & SURGICAL HISTORY:    CHF (congestive heart failure)    Gout    HTN (hypertension)    COPD (chronic obstructive pulmonary disease)  on home O2    MIGUEL (obstructive sleep apnea)    Pulmonary hypertension    Atrial fibrillation  S/P Ablation    Tricuspid valve repair    History of mitral valve replacement with mechanical valve    INTERVAL HISTORY: resting in bed, c/o feeling weak, no further dizziness  	  MEDICATIONS:  MEDICATIONS  (STANDING):  allopurinol 100 milliGRAM(s) Oral daily  atorvastatin 40 milliGRAM(s) Oral at bedtime  budesonide 160 MICROgram(s)/formoterol 4.5 MICROgram(s) Inhaler 2 Puff(s) Inhalation two times a day  furosemide    Tablet 40 milliGRAM(s) Oral two times a day  heparin  Infusion.  Unit(s)/Hr (19 mL/Hr) IV Continuous <Continuous>  influenza   Vaccine 0.5 milliLiter(s) IntraMuscular once  metoprolol tartrate 25 milliGRAM(s) Oral daily  warfarin 5 milliGRAM(s) Oral once    MEDICATIONS  (PRN):  heparin   Injectable 9000 Unit(s) IV Push every 6 hours PRN For aPTT less than 40  heparin   Injectable 4000 Unit(s) IV Push every 6 hours PRN For aPTT between 40 - 57    Vitals:  T(F): 98.6 (12-31-20 @ 10:32), Max: 98.9 (12-30-20 @ 16:00)  HR: 91 (12-31-20 @ 10:32) (66 - 93)  BP: 126/62 (12-31-20 @ 10:32) (126/62 - 154/82)  RR: 20 (12-31-20 @ 10:32) (18 - 20)  SpO2: 98% (12-31-20 @ 10:32) (95% - 99%)    12-30 @ 07:01  -  12-31 @ 07:00  --------------------------------------------------------  IN:    Oral Fluid: 240 mL  Total IN: 240 mL    OUT:    Voided (mL): 1 mL  Total OUT: 1 mL    Total NET: 239 mL    Weight (kg): 108.9 (12-29 @ 16:03)  BMI (kg/m2): 41.2 (12-29 @ 16:03)    PHYSICAL EXAM:  Neuro: Awake, responsive  CV: S1 S2 RRR + mechanical click    Lungs: few rales to bases   GI: Soft, BS +, ND, NT  Extremities: + LE edema    TELEMETRY: atrial fibrillation    RADIOLOGY: < from: Xray Chest 1 View- PORTABLE-Urgent (Xray Chest 1 View- PORTABLE-Urgent .) (12.29.20 @ 16:57) >   Heart enlargement, heart surgery, and mild central CHF.    < end of copied text >    DIAGNOSTIC TESTING:    [x ] Echocardiogram: < from: TTE with Doppler (w/Cont) (10.04.20 @ 09:52) >  Mitral Valve: Mechanical prosthetic mitral valve  replacement. Mean transmitral valve gradient equals 8 mm  Hg, which is elevated even in the setting of a mechanical  prosthetic mitral valve replacement.  Aortic Valve/Aorta: Aortic valve not well visualized;  appears calcified. Peak transaortic valve gradient equals  10 mm Hg, mean transaortic valve gradient equals 6 mm Hg,  aortic valve velocity time integral equals 27 cm. Minimal  aortic regurgitation.  Peak left ventricular outflow tract  gradient equals 3 mm Hg, mean gradient is equal to 2 mm Hg,  LVOT velocity timeintegral equals 13 cm.  Aortic Root: 3.4 cm.  LVOT diameter: 2.2 cm.  Left Atrium: Left atrial enlargement  Left Ventricle: Normal left ventricular systolic function.  No segmental wall motion abnormalities. Endocardial  visualization enhanced with intravenous injection of  Ultrasonic Enhancing Agent (Definity).  Septal motion  consistent with cardiac surgery.  Right Heart: Right atrial enlargement The right ventricle  is not well visualized appears enlarged.. Normal tricuspid  valve. Minimal tricuspid regurgitation.  Pericardium/Pleura: Normal pericardium with no pericardial  effusion.  Hemodynamic: Estimated right atrial pressure is 8 mm Hg.  ------------------------------------------------------------------------  Conclusions:  1. Mechanical prosthetic mitral valve replacement. Mean  transmitral valve gradient equals 8 mm Hg, which is  elevated even in the setting of a mechanical prosthetic  mitral valve replacement.  2. Aortic valve not well visualized; appears calcified.  Peak transaortic valve gradient equals 10 mm Hg, mean  transaortic valve gradient equals 6 mm Hg, aortic valve  velocity time integral equals 27 cm. Minimal aortic  regurgitation.  3. Normal left ventricular systolic function. No segmental  wall motion abnormalities. Endocardial visualization  enhanced with intravenous injection of Ultrasonic Enhancing  Agent (Definity).  Septal motion consistent with cardiac  surgery.  4. The right ventricle is not well visualized appears  enlarged..    < end of copied text >    LABS:	 	    CARDIAC MARKERS:  Troponin I, Serum: <.015 ng/mL (12-30 @ 07:24)  Troponin I, Serum: <.015 ng/mL (12-29 @ 22:14)  Troponin I, Serum: <.015 ng/mL (12-29 @ 16:56)    31 Dec 2020 06:21    137    |  98     |  42     ----------------------------<  118    4.0     |  33     |  1.49   30 Dec 2020 07:24    135    |  96     |  41     ----------------------------<  102    4.2     |  37     |  1.35   29 Dec 2020 16:56    135    |  95     |  40     ----------------------------<  110    4.4     |  37     |  1.38     Ca    9.8        31 Dec 2020 06:21  Phos  2.8       31 Dec 2020 06:21  Mg     2.2       31 Dec 2020 06:21    TPro  9.0    /  Alb  3.3    /  TBili  0.5    /  DBili  x      /  AST  13     /  ALT  31     /  AlkPhos  122    30 Dec 2020 07:24                          8.0    7.76  )-----------( 396      ( 31 Dec 2020 06:21 )             28.2 ,                       8.0    7.70  )-----------( 393      ( 30 Dec 2020 21:27 )             28.8 ,                       8.2    9.37  )-----------( 379      ( 29 Dec 2020 22:05 )             29.2 ,                       7.8    6.40  )-----------( 357      ( 29 Dec 2020 16:56 )             28.8   proBNP: Serum Pro-Brain Natriuretic Peptide: 1336 pg/mL (12-29 @ 16:56)    PT/PTT- ( 31 Dec 2020 06:21 )   PT: 19.3 sec;  PTT: 152.0 sec    PT/PTT- ( 30 Dec 2020 21:27 )   PT: x    ;  PTT: >200.0 sec    INR: 1.71 ratio (12-31 @ 06:21)  INR: 1.68 ratio (12-30 @ 07:26)  INR: 1.65 ratio (12-29 @ 22:59)  INR: 1.70 ratio (12-29 @ 16:56)           Patient is a 70y old  Female who presents with a chief complaint of chest pain (31 Dec 2020 09:24)    PAST MEDICAL & SURGICAL HISTORY:    CHF (congestive heart failure)    Gout    HTN (hypertension)    COPD (chronic obstructive pulmonary disease)  on home O2    MIGUEL (obstructive sleep apnea)    Pulmonary hypertension    Atrial fibrillation  S/P Ablation    Tricuspid valve replacement 2012    History of mitral valve replacement with mechanical valve 1999    INTERVAL HISTORY: resting in bed, c/o feeling weak, no further dizziness  	  MEDICATIONS:  MEDICATIONS  (STANDING):  allopurinol 100 milliGRAM(s) Oral daily  atorvastatin 40 milliGRAM(s) Oral at bedtime  budesonide 160 MICROgram(s)/formoterol 4.5 MICROgram(s) Inhaler 2 Puff(s) Inhalation two times a day  furosemide    Tablet 40 milliGRAM(s) Oral two times a day  heparin  Infusion.  Unit(s)/Hr (19 mL/Hr) IV Continuous <Continuous>  influenza   Vaccine 0.5 milliLiter(s) IntraMuscular once  metoprolol tartrate 25 milliGRAM(s) Oral daily  warfarin 5 milliGRAM(s) Oral once    MEDICATIONS  (PRN):  heparin   Injectable 9000 Unit(s) IV Push every 6 hours PRN For aPTT less than 40  heparin   Injectable 4000 Unit(s) IV Push every 6 hours PRN For aPTT between 40 - 57    Vitals:  T(F): 98.6 (12-31-20 @ 10:32), Max: 98.9 (12-30-20 @ 16:00)  HR: 91 (12-31-20 @ 10:32) (66 - 93)  BP: 126/62 (12-31-20 @ 10:32) (126/62 - 154/82)  RR: 20 (12-31-20 @ 10:32) (18 - 20)  SpO2: 98% (12-31-20 @ 10:32) (95% - 99%)    12-30 @ 07:01  -  12-31 @ 07:00  --------------------------------------------------------  IN:    Oral Fluid: 240 mL  Total IN: 240 mL    OUT:    Voided (mL): 1 mL  Total OUT: 1 mL    Total NET: 239 mL    Weight (kg): 108.9 (12-29 @ 16:03)  BMI (kg/m2): 41.2 (12-29 @ 16:03)    PHYSICAL EXAM:  Neuro: Awake, responsive  CV: S1 S2 RRR + mechanical click    Lungs: few rales to bases   GI: Soft, BS +, ND, NT  Extremities: + LE edema    TELEMETRY: atrial fibrillation    RADIOLOGY: < from: Xray Chest 1 View- PORTABLE-Urgent (Xray Chest 1 View- PORTABLE-Urgent .) (12.29.20 @ 16:57) >   Heart enlargement, heart surgery, and mild central CHF.    < end of copied text >    DIAGNOSTIC TESTING:    [x ] Echocardiogram: < from: TTE with Doppler (w/Cont) (10.04.20 @ 09:52) >  Mitral Valve: Mechanical prosthetic mitral valve  replacement. Mean transmitral valve gradient equals 8 mm  Hg, which is elevated even in the setting of a mechanical  prosthetic mitral valve replacement.  Aortic Valve/Aorta: Aortic valve not well visualized;  appears calcified. Peak transaortic valve gradient equals  10 mm Hg, mean transaortic valve gradient equals 6 mm Hg,  aortic valve velocity time integral equals 27 cm. Minimal  aortic regurgitation.  Peak left ventricular outflow tract  gradient equals 3 mm Hg, mean gradient is equal to 2 mm Hg,  LVOT velocity timeintegral equals 13 cm.  Aortic Root: 3.4 cm.  LVOT diameter: 2.2 cm.  Left Atrium: Left atrial enlargement  Left Ventricle: Normal left ventricular systolic function.  No segmental wall motion abnormalities. Endocardial  visualization enhanced with intravenous injection of  Ultrasonic Enhancing Agent (Definity).  Septal motion  consistent with cardiac surgery.  Right Heart: Right atrial enlargement The right ventricle  is not well visualized appears enlarged.. Normal tricuspid  valve. Minimal tricuspid regurgitation.  Pericardium/Pleura: Normal pericardium with no pericardial  effusion.  Hemodynamic: Estimated right atrial pressure is 8 mm Hg.  ------------------------------------------------------------------------  Conclusions:  1. Mechanical prosthetic mitral valve replacement. Mean  transmitral valve gradient equals 8 mm Hg, which is  elevated even in the setting of a mechanical prosthetic  mitral valve replacement.  2. Aortic valve not well visualized; appears calcified.  Peak transaortic valve gradient equals 10 mm Hg, mean  transaortic valve gradient equals 6 mm Hg, aortic valve  velocity time integral equals 27 cm. Minimal aortic  regurgitation.  3. Normal left ventricular systolic function. No segmental  wall motion abnormalities. Endocardial visualization  enhanced with intravenous injection of Ultrasonic Enhancing  Agent (Definity).  Septal motion consistent with cardiac  surgery.  4. The right ventricle is not well visualized appears  enlarged..    < end of copied text >    LABS:	 	    CARDIAC MARKERS:  Troponin I, Serum: <.015 ng/mL (12-30 @ 07:24)  Troponin I, Serum: <.015 ng/mL (12-29 @ 22:14)  Troponin I, Serum: <.015 ng/mL (12-29 @ 16:56)    31 Dec 2020 06:21    137    |  98     |  42     ----------------------------<  118    4.0     |  33     |  1.49   30 Dec 2020 07:24    135    |  96     |  41     ----------------------------<  102    4.2     |  37     |  1.35   29 Dec 2020 16:56    135    |  95     |  40     ----------------------------<  110    4.4     |  37     |  1.38     Ca    9.8        31 Dec 2020 06:21  Phos  2.8       31 Dec 2020 06:21  Mg     2.2       31 Dec 2020 06:21    TPro  9.0    /  Alb  3.3    /  TBili  0.5    /  DBili  x      /  AST  13     /  ALT  31     /  AlkPhos  122    30 Dec 2020 07:24                          8.0    7.76  )-----------( 396      ( 31 Dec 2020 06:21 )             28.2 ,                       8.0    7.70  )-----------( 393      ( 30 Dec 2020 21:27 )             28.8 ,                       8.2    9.37  )-----------( 379      ( 29 Dec 2020 22:05 )             29.2 ,                       7.8    6.40  )-----------( 357      ( 29 Dec 2020 16:56 )             28.8   proBNP: Serum Pro-Brain Natriuretic Peptide: 1336 pg/mL (12-29 @ 16:56)    PT/PTT- ( 31 Dec 2020 06:21 )   PT: 19.3 sec;  PTT: 152.0 sec    PT/PTT- ( 30 Dec 2020 21:27 )   PT: x    ;  PTT: >200.0 sec    INR: 1.71 ratio (12-31 @ 06:21)  INR: 1.68 ratio (12-30 @ 07:26)  INR: 1.65 ratio (12-29 @ 22:59)  INR: 1.70 ratio (12-29 @ 16:56)

## 2020-12-31 NOTE — DIETITIAN INITIAL EVALUATION ADULT. - OTHER INFO
Pt live with spouse and adult children. Pt independent w ADL c some assist from family. Denies any N/V/D/C, no issues with chewing or swallowing; pt endorses poor appetite currently and request a nutritional supplement, to supplement meals with poor PO. Per chart review pt consumed >75% of some meals. Pt taught back to RD low sodium nutrition therapy; pt reports weighing herself every few days and understands the importances of daily weights. Pt reports stable weight 240-245#. Per chart review previous admission wt 09/2020 240#. Pt denies any food allergies. Pt live with spouse and adult children. Pt independent w ADL c some assist from family. Denies any N/V/D/C, no issues with chewing or swallowing; pt endorses poor appetite currently and requests a nutritional supplement that "will not raise potassium or phosphorus", to supplement meals with poor PO. Per chart review pt consumed >75% of some meals. Pt taught back to RD low sodium nutrition therapy; pt reports weighing herself every few days and understands the importances of daily weights. Pt reports stable weight 240-245#. Per chart review previous admission wt 09/2020 240#. Pt denies any food allergies.

## 2020-12-31 NOTE — PROGRESS NOTE ADULT - ASSESSMENT
70 year old female with PMH of CAD with mechanical prosthetic MVR and tricuspid valve repair hx  on coumadin, INR goal 2.5-3.5, afib s/p ablation, COPD on 3L home O2, HTN, HLD, Gout, CKD 3,  otherwise presenting to ED due to chest pain on left with associated dizziness, mild headaches and SOB with exertion for past 1 week without fever/chills but symptoms worsening so pt came in for evaluation. In the ED, pt had labwork which revealed INR 1.7, trop 0.015, ekg RBBB ?chronic, no st changes.  Event of bradycardia (39) on 12/30   rate controlled afib on tele, no further bradycardia noted    PLAN:     Tele monitoring.  holding dig, level was 1.11  Cont Lasix 40 bid PO  Cont low dose metoprolol  Cont with IV heparin infusion with mech MVR, INR now 1.71, cont coumadin, keep INR 2.5-3.5  f/u echo pending. 70 year old female with PMH of CAD with mechanical prosthetic MVR and tricuspid valve repair hx  on coumadin, INR goal 2.5-3.5, afib s/p ablation, COPD on 3L home O2, HTN, HLD, Gout, CKD 3,  otherwise presenting to ED due to chest pain on left with associated dizziness, mild headaches and SOB with exertion for past 1 week without fever/chills but symptoms worsening so pt came in for evaluation. In the ED, pt had labwork which revealed INR 1.7, trop 0.015, ekg RBBB ?chronic, no st changes.  Event of bradycardia (39) on 12/30 with c/o dizziness  rate controlled afib on tele, no further bradycardia noted    PLAN:     Tele monitoring.  holding dig 2/2 event of bradycardia, level was 1.11  Cont Lasix 40 bid PO, monitor renal function/electrolytes   Cont low dose metoprolol ER  Cont with IV heparin infusion with mechanical  MVR, INR now 1.71, cont coumadin, keep INR 2.5-3.5, monitor h/h, mild epistaxis noted, h/h steady, nasal packing was removed, ?ENT eval   f/u echo pending.  Activity as tolerated  70 year old female with PMH of CAD with mechanical prosthetic MVR and TVR hx  on coumadin, INR goal 2.5-3.5, afib s/p ablation, COPD on 3L home O2, HTN, HLD, Gout, CKD 3,  otherwise presenting to ED due to chest pain on left with associated dizziness, mild headaches and SOB with exertion for past 1 week without fever/chills but symptoms worsening so pt came in for evaluation. In the ED, pt had lab work which revealed INR 1.7, trop 0.015, ekg RBBB, chronic.     Event of bradycardia (39) on 12/30 with c/o dizziness  rate controlled afib on tele, no further bradycardia noted    PLAN:     Tele monitoring.  holding dig 2/2 event of bradycardia, level was 1.11  Cont Lasix 40 bid PO, monitor renal function/electrolytes   Cont low dose metoprolol ER  Cont with IV heparin infusion with mechanical  MVR, INR now 1.71, cont coumadin, keep INR 2.5-3.5, monitor h/h, mild epistaxis noted, h/h steady, nasal packing was removed, ?ENT eval   f/u echo pending.  check uric acid level  Activity as tolerated

## 2020-12-31 NOTE — DIETITIAN INITIAL EVALUATION ADULT. - PERTINENT MEDS FT
MEDICATIONS  (STANDING):  allopurinol 100 milliGRAM(s) Oral daily  atorvastatin 40 milliGRAM(s) Oral at bedtime  budesonide 160 MICROgram(s)/formoterol 4.5 MICROgram(s) Inhaler 2 Puff(s) Inhalation two times a day  furosemide    Tablet 40 milliGRAM(s) Oral two times a day  heparin  Infusion.  Unit(s)/Hr (19 mL/Hr) IV Continuous <Continuous>  influenza   Vaccine 0.5 milliLiter(s) IntraMuscular once  metoprolol succinate ER 25 milliGRAM(s) Oral daily  warfarin 5 milliGRAM(s) Oral once    MEDICATIONS  (PRN):  heparin   Injectable 9000 Unit(s) IV Push every 6 hours PRN For aPTT less than 40  heparin   Injectable 4000 Unit(s) IV Push every 6 hours PRN For aPTT between 40 - 57

## 2020-12-31 NOTE — DIETITIAN INITIAL EVALUATION ADULT. - ORAL INTAKE PTA/DIET HISTORY
Pt reported following a no salt added diet at home, pt reports  prepares meals that are large, thus she cannot eat all; prefers smaller (regular) portions like hospital size portions.

## 2021-01-01 LAB
ANION GAP SERPL CALC-SCNC: 4 MMOL/L — LOW (ref 5–17)
APTT BLD: 89.5 SEC — HIGH (ref 27.5–35.5)
BUN SERPL-MCNC: 42 MG/DL — HIGH (ref 7–23)
CALCIUM SERPL-MCNC: 9.6 MG/DL — SIGNIFICANT CHANGE UP (ref 8.5–10.1)
CHLORIDE SERPL-SCNC: 95 MMOL/L — LOW (ref 96–108)
CO2 SERPL-SCNC: 37 MMOL/L — HIGH (ref 22–31)
CREAT SERPL-MCNC: 1.5 MG/DL — HIGH (ref 0.5–1.3)
GLUCOSE SERPL-MCNC: 138 MG/DL — HIGH (ref 70–99)
HCT VFR BLD CALC: 27.5 % — LOW (ref 34.5–45)
HGB BLD-MCNC: 7.8 G/DL — LOW (ref 11.5–15.5)
INR BLD: 1.84 RATIO — HIGH (ref 0.88–1.16)
MCHC RBC-ENTMCNC: 25 PG — LOW (ref 27–34)
MCHC RBC-ENTMCNC: 28.4 GM/DL — LOW (ref 32–36)
MCV RBC AUTO: 88.1 FL — SIGNIFICANT CHANGE UP (ref 80–100)
NRBC # BLD: 0 /100 WBCS — SIGNIFICANT CHANGE UP (ref 0–0)
PLATELET # BLD AUTO: 381 K/UL — SIGNIFICANT CHANGE UP (ref 150–400)
POTASSIUM SERPL-MCNC: 3.9 MMOL/L — SIGNIFICANT CHANGE UP (ref 3.5–5.3)
POTASSIUM SERPL-SCNC: 3.9 MMOL/L — SIGNIFICANT CHANGE UP (ref 3.5–5.3)
PROTHROM AB SERPL-ACNC: 20.7 SEC — HIGH (ref 10.6–13.6)
RBC # BLD: 3.12 M/UL — LOW (ref 3.8–5.2)
RBC # FLD: 16.7 % — HIGH (ref 10.3–14.5)
SODIUM SERPL-SCNC: 136 MMOL/L — SIGNIFICANT CHANGE UP (ref 135–145)
WBC # BLD: 8.33 K/UL — SIGNIFICANT CHANGE UP (ref 3.8–10.5)
WBC # FLD AUTO: 8.33 K/UL — SIGNIFICANT CHANGE UP (ref 3.8–10.5)

## 2021-01-01 PROCEDURE — 99233 SBSQ HOSP IP/OBS HIGH 50: CPT

## 2021-01-01 RX ORDER — WARFARIN SODIUM 2.5 MG/1
5 TABLET ORAL ONCE
Refills: 0 | Status: COMPLETED | OUTPATIENT
Start: 2021-01-01 | End: 2021-01-01

## 2021-01-01 RX ADMIN — Medication 25 MILLIGRAM(S): at 05:03

## 2021-01-01 RX ADMIN — BUDESONIDE AND FORMOTEROL FUMARATE DIHYDRATE 2 PUFF(S): 160; 4.5 AEROSOL RESPIRATORY (INHALATION) at 18:18

## 2021-01-01 RX ADMIN — WARFARIN SODIUM 5 MILLIGRAM(S): 2.5 TABLET ORAL at 21:52

## 2021-01-01 RX ADMIN — BUDESONIDE AND FORMOTEROL FUMARATE DIHYDRATE 2 PUFF(S): 160; 4.5 AEROSOL RESPIRATORY (INHALATION) at 06:31

## 2021-01-01 RX ADMIN — ATORVASTATIN CALCIUM 40 MILLIGRAM(S): 80 TABLET, FILM COATED ORAL at 21:52

## 2021-01-01 RX ADMIN — Medication 40 MILLIGRAM(S): at 05:03

## 2021-01-01 RX ADMIN — Medication 100 MILLIGRAM(S): at 12:18

## 2021-01-01 RX ADMIN — Medication 40 MILLIGRAM(S): at 18:14

## 2021-01-01 RX ADMIN — HEPARIN SODIUM 1300 UNIT(S)/HR: 5000 INJECTION INTRAVENOUS; SUBCUTANEOUS at 01:12

## 2021-01-01 NOTE — PROGRESS NOTE ADULT - ASSESSMENT
70 year old female with PMH of CAD with mechanical prosthetic MVR and TVR hx  on coumadin, INR goal 2.5-3.5, afib s/p ablation, COPD on 3L home O2, HTN, HLD, Gout, CKD 3,  otherwise presenting to ED due to chest pain on left with associated dizziness, mild headaches and SOB with exertion for past 1 week without fever/chills but symptoms worsening so pt came in for evaluation. In the ED, pt had lab work which revealed INR 1.7, trop 0.015, ekg RBBB, chronic.     Event of bradycardia (39) on 12/30 with c/o dizziness  rate controlled afib on tele, no further bradycardia noted    PLAN:       allopurinol 100 milliGRAM(s) Oral daily  atorvastatin 40 milliGRAM(s) Oral at bedtime  budesonide 160 MICROgram(s)/formoterol 4.5 MICROgram(s) Inhaler 2 Puff(s) Inhalation two times a day  furosemide    Tablet 40 milliGRAM(s) Oral two times a day  heparin  Infusion.  Unit(s)/Hr (19 mL/Hr) IV Continuous <Continuous>  metoprolol succinate ER 25 milliGRAM(s) Oral daily  warfarin 5 milliGRAM(s) Oral once    Tele monitoring.  holding dig 2/2 event of bradycardia, level was 1.11  renal function/electrolytes   Stay on IV heparin infusion with mechanical  MVR, until INR 2.5 - 3.5    Montana Barreto MD, [facility]

## 2021-01-01 NOTE — PROGRESS NOTE ADULT - ASSESSMENT
70 year old female with PMH of CAD with Mitral and tricuspid valve repair hx  on coumadin, INR goal 2.5-3.5, afib s/p ablation, COPD on 3L home O2, HTN, HLD, Gout, CKD 3,  otherwise presenting to ED due to chest pain on left with associated dizziness, mild headaches and SOB with exertion for past 1 week without fever/chills but symptoms worsening so pt came in for evaluation.       Pre-syncope.    -Possibly multifactorial  -Afib/anemia  -orthostatics- + from laying to sitting   -2D echo- reviewed preserved EF  -will order carotids of neck  -will ask Neurology to also evaluate patient   -Heparin drip on top of Coumadin 5mg at home,     Chronic Atrial fibrillation  -HR normal, BP stable  -dig level-1.11 wnl  -lowered metoprolol to 25mg as per Cards   -Heparin drip on top of Coumadin 5mg at home, close monitoring of INR          CAD with mechanical prosthetic MVR and tricuspid valve repair  INR subtheraputic (norm 2.5-3.5)   -continue Heparin drip and Coumadin       Chest pain.  Plan: -r/o acs  -trend trops x 3 negative  -pt denies chest pain currently      COPD    cont home meds.     Anemia  -continue to monitor closely H/H -continues to be stable    Epistaxis  -packing  -monitor, seems to be decreasing           DVT ppx-Coumadin and Heparin drip

## 2021-01-01 NOTE — PROGRESS NOTE ADULT - SUBJECTIVE AND OBJECTIVE BOX
Patient is a 70y old  Female who presents with a chief complaint of chest pain (30 Dec 2020 15:21)      INTERVAL HPI/OVERNIGHT EVENTS: Pt doing ok, states that feels better, however from time to time especially when getting up still feels dizzy- "as if everything is spinning"   Still has mildly bloody nose.     MEDICATIONS  (STANDING):  allopurinol 100 milliGRAM(s) Oral daily  atorvastatin 40 milliGRAM(s) Oral at bedtime  budesonide 160 MICROgram(s)/formoterol 4.5 MICROgram(s) Inhaler 2 Puff(s) Inhalation two times a day  furosemide    Tablet 40 milliGRAM(s) Oral two times a day  heparin  Infusion.  Unit(s)/Hr (19 mL/Hr) IV Continuous <Continuous>  influenza   Vaccine 0.5 milliLiter(s) IntraMuscular once  metoprolol tartrate 25 milliGRAM(s) Oral daily    MEDICATIONS  (PRN):  heparin   Injectable 9000 Unit(s) IV Push every 6 hours PRN For aPTT less than 40  heparin   Injectable 4000 Unit(s) IV Push every 6 hours PRN For aPTT between 40 - 57      Allergies    No Known Allergies    Intolerances        REVIEW OF SYSTEMS:  CONSTITUTIONAL: No fever, weight loss, or fatigue  EYES: No eye pain, visual disturbances, or discharge  ENMT:  No difficulty hearing, tinnitus, vertigo; No sinus or throat pain  NECK: No pain or stiffness  BREASTS: No pain, masses, or nipple discharge  RESPIRATORY: No cough, wheezing, chills or hemoptysis; No shortness of breath  CARDIOVASCULAR: No chest pain, palpitations, + dizziness, or leg swelling  GASTROINTESTINAL: No abdominal or epigastric pain. No nausea, vomiting, or hematemesis; No diarrhea or constipation. No melena or hematochezia.  GENITOURINARY: No dysuria, frequency, hematuria, or incontinence  NEUROLOGICAL: No headaches, memory loss, loss of strength, numbness, or tremors  SKIN: No itching, burning, rashes, or lesions   LYMPH NODES: No enlarged glands  ENDOCRINE: No heat or cold intolerance; No hair loss  MUSCULOSKELETAL: No joint pain or swelling; No muscle, back, or extremity pain  PSYCHIATRIC: No depression, anxiety, mood swings, or difficulty sleeping  HEME/LYMPH: No easy bruising, or bleeding gums  ALLERGY AND IMMUNOLOGIC: No hives or eczema    ICU Vital Signs Last 24 Hrs  T(C): 36.3 (01 Jan 2021 10:50), Max: 37.5 (31 Dec 2020 17:01)  T(F): 97.4 (01 Jan 2021 10:50), Max: 99.5 (31 Dec 2020 17:01)  HR: 72 (01 Jan 2021 10:50) (72 - 85)  BP: 125/76 (01 Jan 2021 10:50) (120/77 - 163/81)  BP(mean): --  ABP: --  ABP(mean): --  RR: 17 (01 Jan 2021 10:50) (16 - 18)  SpO2: 96% (01 Jan 2021 10:50) (94% - 98%)    PHYSICAL EXAM:  GENERAL: NAD, well-groomed, well-developed  HEAD:  Atraumatic, Normocephalic  EYES: EOMI, PERRLA, conjunctiva and sclera clear  ENMT: No tonsillar erythema, exudates, or enlargement; Moist mucous membranes, Good dentition, No lesions  NECK: Supple, No JVD, Normal thyroid  NERVOUS SYSTEM:  Alert & Oriented X3, Good concentration; Motor Strength 5/5 B/L upper and lower extremities; DTRs 2+ intact and symmetric  CHEST/LUNG: Clear to percussion bilaterally; No rales, rhonchi, wheezing, or rubs  HEART: Regular rate and rhythm; No murmurs, rubs, or gallops  ABDOMEN: Soft, Nontender, Nondistended; Bowel sounds present  EXTREMITIES:  2+ Peripheral Pulses, No clubbing, cyanosis, or mild edema (as per pt legs are always bit swollen)  LYMPH: No lymphadenopathy noted  SKIN: No rashes or lesions                                               7.8    8.33  )-----------( 381      ( 01 Jan 2021 07:34 )             27.5     01-01    136  |  95<L>  |  42<H>  ----------------------------<  138<H>  3.9   |  37<H>  |  1.50<H>    Ca    9.6      01 Jan 2021 07:34  Phos  2.8     12-31  Mg     2.2     12-31      PT/INR - ( 31 Dec 2020 06:21 )   PT: 19.3 sec;   INR: 1.71 ratio         PTT - ( 01 Jan 2021 01:01 )  PTT:89.5 sec

## 2021-01-01 NOTE — PROGRESS NOTE ADULT - SUBJECTIVE AND OBJECTIVE BOX
Patient is a 70y old  Female who presents with a chief complaint of chest pain (31 Dec 2020 09:24)    PAST MEDICAL & SURGICAL HISTORY:    CHF (congestive heart failure)    Gout    HTN (hypertension)    COPD (chronic obstructive pulmonary disease)  on home O2    MIGUEL (obstructive sleep apnea)    Pulmonary hypertension    Atrial fibrillation  S/P Ablation    Tricuspid valve replacement 2012    History of mitral valve replacement with mechanical valve 1999    INTERVAL HISTORY: resting in bed, feeling better.      Vitals:  Vital Signs Last 24 Hrs  T(C): 37.4 (01 Jan 2021 16:54), Max: 37.4 (01 Jan 2021 16:54)  T(F): 99.3 (01 Jan 2021 16:54), Max: 99.3 (01 Jan 2021 16:54)  HR: 69 (01 Jan 2021 16:54) (69 - 85)  BP: 142/83 (01 Jan 2021 16:54) (122/79 - 142/83)  RR: 17 (01 Jan 2021 16:54) (17 - 18)  SpO2: 94% (01 Jan 2021 16:54) (94% - 98%)    PHYSICAL EXAM:  Neuro: Awake, responsive  CV: S1 S2 RRR + mechanical click    Lungs: few rales to bases   GI: Soft, BS +, ND, NT  Extremities: + LE edema    TELEMETRY: atrial fibrillation periods of RVR    RADIOLOGY: < from: Xray Chest 1 View- PORTABLE-Urgent (Xray Chest 1 View- PORTABLE-Urgent .) (12.29.20 @ 16:57) >   Heart enlargement, heart surgery, and mild central CHF.    < end of copied text >    DIAGNOSTIC TESTING:    [x ] Echocardiogram: < from: TTE with Doppler (w/Cont) (10.04.20 @ 09:52) >  Mitral Valve: Mechanical prosthetic mitral valve  replacement. Mean transmitral valve gradient equals 8 mm  Hg, which is elevated even in the setting of a mechanical  prosthetic mitral valve replacement.  Aortic Valve/Aorta: Aortic valve not well visualized;  appears calcified. Peak transaortic valve gradient equals  10 mm Hg, mean transaortic valve gradient equals 6 mm Hg,  aortic valve velocity time integral equals 27 cm. Minimal  aortic regurgitation.  Peak left ventricular outflow tract  gradient equals 3 mm Hg, mean gradient is equal to 2 mm Hg,  LVOT velocity timeintegral equals 13 cm.  Aortic Root: 3.4 cm.  LVOT diameter: 2.2 cm.  Left Atrium: Left atrial enlargement  Left Ventricle: Normal left ventricular systolic function.  No segmental wall motion abnormalities. Endocardial  visualization enhanced with intravenous injection of  Ultrasonic Enhancing Agent (Definity).  Septal motion  consistent with cardiac surgery.  Right Heart: Right atrial enlargement The right ventricle  is not well visualized appears enlarged.. Normal tricuspid  valve. Minimal tricuspid regurgitation.  Pericardium/Pleura: Normal pericardium with no pericardial  effusion.  Hemodynamic: Estimated right atrial pressure is 8 mm Hg.  ------------------------------------------------------------------------  Conclusions:  1. Mechanical prosthetic mitral valve replacement. Mean  transmitral valve gradient equals 8 mm Hg, which is  elevated even in the setting of a mechanical prosthetic  mitral valve replacement.  2. Aortic valve not well visualized; appears calcified.  Peak transaortic valve gradient equals 10 mm Hg, mean  transaortic valve gradient equals 6 mm Hg, aortic valve  velocity time integral equals 27 cm. Minimal aortic  regurgitation.  3. Normal left ventricular systolic function. No segmental  wall motion abnormalities. Endocardial visualization  enhanced with intravenous injection of Ultrasonic Enhancing  Agent (Definity).  Septal motion consistent with cardiac  surgery.  4. The right ventricle is not well visualized appears  enlarged..    < end of copied text >    LABS:	 	    CARDIAC MARKERS:  Troponin I, Serum: <.015 ng/mL (12-30 @ 07:24)  Troponin I, Serum: <.015 ng/mL (12-29 @ 22:14)  Troponin I, Serum: <.015 ng/mL (12-29 @ 16:56)    INR: 1.71 ratio (12-31 @ 06:21)  INR: 1.68 ratio (12-30 @ 07:26)  INR: 1.65 ratio (12-29 @ 22:59)  INR: 1.70 ratio (12-29 @ 16:56)                          7.8    8.33  )-----------( 381      ( 01 Jan 2021 07:34 )             27.5       01-01    136  |  95<L>  |  42<H>  ----------------------------<  138<H>  3.9   |  37<H>  |  1.50<H>    Ca    9.6      01 Jan 2021 07:34  Phos  2.8     12-31  Mg     2.2     12-31      < from: TTE Echo Complete w/ Contrast w/ Doppler (12.31.20 @ 14:01) >  Summary:   1. Left ventricular ejection fraction, by visual estimation, is 60 to 65%.   2. Technically suboptimal study.   3. Normal global left ventricular systolic function.   4. Normal left ventricular internal cavity size.   5. Spectral Doppler shows pseudonormal pattern of left ventricular myocardial filling (Grade II diastolic dysfunction).   6. Normal right ventricular size and function.   7. Mild to moderately enlarged left atrium.   8. There is no evidence of pericardial effusion.   9. Mild mitral annular calcification.  10. Mild mitral valve regurgitation.  11. Mild thickening and calcification of the anterior and posterior mitral valve leaflets.  12. Peak transmitral mean gradient equals 6.8 mmHg, calculated mitral valve area by pressure half time equals 1.86 cm² consistent withmild mitral stenosis.  13. Mild tricuspid regurgitation.  14. Mild aortic regurgitation.  15. Sclerotic aortic valve with normal opening.  16. Estimated pulmonary artery systolic pressure is 68.4 mmHg assuming a right atrial pressure of 8 mmHg, whichis consistent with severe pulmonary hypertension.  17. C/w the study of 6-21-20, more significant pulmonary htn is now noted.  18. Endocardial visualization was enhanced with intravenous echo contrast.  19. Mitral valve mean gradient is 6.8 mmHg consistent with moderate mitral stenosis.    < end of copied text >

## 2021-01-02 LAB
ANION GAP SERPL CALC-SCNC: 2 MMOL/L — LOW (ref 5–17)
APTT BLD: 70.8 SEC — HIGH (ref 27.5–35.5)
BUN SERPL-MCNC: 47 MG/DL — HIGH (ref 7–23)
CALCIUM SERPL-MCNC: 9.2 MG/DL — SIGNIFICANT CHANGE UP (ref 8.5–10.1)
CHLORIDE SERPL-SCNC: 96 MMOL/L — SIGNIFICANT CHANGE UP (ref 96–108)
CO2 SERPL-SCNC: 37 MMOL/L — HIGH (ref 22–31)
CREAT SERPL-MCNC: 1.46 MG/DL — HIGH (ref 0.5–1.3)
GLUCOSE SERPL-MCNC: 89 MG/DL — SIGNIFICANT CHANGE UP (ref 70–99)
HCT VFR BLD CALC: 25.8 % — LOW (ref 34.5–45)
HGB BLD-MCNC: 7.1 G/DL — LOW (ref 11.5–15.5)
INR BLD: 1.85 RATIO — HIGH (ref 0.88–1.16)
MCHC RBC-ENTMCNC: 25 PG — LOW (ref 27–34)
MCHC RBC-ENTMCNC: 27.5 GM/DL — LOW (ref 32–36)
MCV RBC AUTO: 90.8 FL — SIGNIFICANT CHANGE UP (ref 80–100)
NRBC # BLD: 0 /100 WBCS — SIGNIFICANT CHANGE UP (ref 0–0)
PLATELET # BLD AUTO: 362 K/UL — SIGNIFICANT CHANGE UP (ref 150–400)
POTASSIUM SERPL-MCNC: 3.9 MMOL/L — SIGNIFICANT CHANGE UP (ref 3.5–5.3)
POTASSIUM SERPL-SCNC: 3.9 MMOL/L — SIGNIFICANT CHANGE UP (ref 3.5–5.3)
PROTHROM AB SERPL-ACNC: 20.8 SEC — HIGH (ref 10.6–13.6)
RBC # BLD: 2.84 M/UL — LOW (ref 3.8–5.2)
RBC # FLD: 16.8 % — HIGH (ref 10.3–14.5)
SODIUM SERPL-SCNC: 135 MMOL/L — SIGNIFICANT CHANGE UP (ref 135–145)
TSH SERPL-MCNC: 1.63 UIU/ML — SIGNIFICANT CHANGE UP (ref 0.36–3.74)
VIT B12 SERPL-MCNC: 826 PG/ML — SIGNIFICANT CHANGE UP (ref 232–1245)
WBC # BLD: 8.64 K/UL — SIGNIFICANT CHANGE UP (ref 3.8–10.5)
WBC # FLD AUTO: 8.64 K/UL — SIGNIFICANT CHANGE UP (ref 3.8–10.5)

## 2021-01-02 PROCEDURE — 99233 SBSQ HOSP IP/OBS HIGH 50: CPT

## 2021-01-02 PROCEDURE — 93880 EXTRACRANIAL BILAT STUDY: CPT | Mod: 26

## 2021-01-02 RX ORDER — WARFARIN SODIUM 2.5 MG/1
7.5 TABLET ORAL ONCE
Refills: 0 | Status: COMPLETED | OUTPATIENT
Start: 2021-01-02 | End: 2021-01-02

## 2021-01-02 RX ADMIN — HEPARIN SODIUM 1300 UNIT(S)/HR: 5000 INJECTION INTRAVENOUS; SUBCUTANEOUS at 07:40

## 2021-01-02 RX ADMIN — Medication 100 MILLIGRAM(S): at 11:59

## 2021-01-02 RX ADMIN — WARFARIN SODIUM 7.5 MILLIGRAM(S): 2.5 TABLET ORAL at 21:36

## 2021-01-02 RX ADMIN — BUDESONIDE AND FORMOTEROL FUMARATE DIHYDRATE 2 PUFF(S): 160; 4.5 AEROSOL RESPIRATORY (INHALATION) at 17:50

## 2021-01-02 RX ADMIN — ATORVASTATIN CALCIUM 40 MILLIGRAM(S): 80 TABLET, FILM COATED ORAL at 21:36

## 2021-01-02 RX ADMIN — Medication 25 MILLIGRAM(S): at 06:45

## 2021-01-02 RX ADMIN — BUDESONIDE AND FORMOTEROL FUMARATE DIHYDRATE 2 PUFF(S): 160; 4.5 AEROSOL RESPIRATORY (INHALATION) at 06:46

## 2021-01-02 RX ADMIN — Medication 40 MILLIGRAM(S): at 17:50

## 2021-01-02 RX ADMIN — Medication 40 MILLIGRAM(S): at 06:45

## 2021-01-02 NOTE — PROVIDER CONTACT NOTE (OTHER) - SITUATION
patient asymptomatic sitting in bed, denies any chest pain had 11 beats of v-tach, vitals as recorded

## 2021-01-02 NOTE — PROGRESS NOTE ADULT - ASSESSMENT
70 year old female with PMH of CAD with Mitral and tricuspid valve repair hx  on coumadin, INR goal 2.5-3.5, afib s/p ablation, COPD on 3L home O2, HTN, HLD, Gout, CKD 3,  otherwise presenting to ED due to chest pain on left with associated dizziness, mild headaches and SOB with exertion for past 1 week without fever/chills but symptoms worsening so pt came in for evaluation.       Pre-syncope.    -Possibly multifactorial  -Afib/anemia  -orthostatics- + from laying to sitting   -2D echo- reviewed preserved EF  -will order carotids of neck  -seen by neuro   -Heparin drip on top of Coumadin 5mg at home,     Chronic Atrial fibrillation  -HR normal, BP stable  -dig level-1.11 wnl  -lowered metoprolol to 25mg as per Cards   -Heparin drip on top of Coumadin  , close monitoring of INR          CAD with mechanical prosthetic MVR and tricuspid valve repair  INR subtheraputic (norm 2.5-3.5)   -continue Heparin drip and Coumadin       Chest pain.  Plan: -r/o acs  -trend trops x 3 negative  -pt denies chest pain currently      COPD    cont home meds.     Anemia  -continue to monitor closely H/H -continues to be stable    Epistaxis  -packing  -monitor, seems to be decreasing     w/ Acute blood loss anemia, hx of cardiac dz transfuse 1un prbc today         DVT ppx-Coumadin and Heparin drip

## 2021-01-02 NOTE — PROGRESS NOTE ADULT - ASSESSMENT
70 year old female with PMH of CAD with mechanical prosthetic MVR and TVR hx  on coumadin, INR goal 2.5-3.5, afib s/p ablation, COPD on 3L home O2, HTN, HLD, Gout, CKD 3,  otherwise presenting to ED due to chest pain on left with associated dizziness, mild headaches and SOB with exertion for past 1 week without fever/chills but symptoms worsening so pt came in for evaluation. In the ED, pt had lab work which revealed INR 1.7, trop 0.015, ekg RBBB, chronic.     Event of bradycardia (39) on 12/30 with c/o dizziness  rate controlled afib on tele, no further bradycardia noted    PLAN:       anemia requiring PRBC support.  Tele monitoring.  renal function/electrolytes       MEDICATIONS  (STANDING):  allopurinol 100 milliGRAM(s) Oral daily  atorvastatin 40 milliGRAM(s) Oral at bedtime  budesonide 160 MICROgram(s)/formoterol 4.5 MICROgram(s) Inhaler 2 Puff(s) Inhalation two times a day  furosemide    Tablet 40 milliGRAM(s) Oral two times a day  heparin  Infusion.  Unit(s)/Hr (19 mL/Hr) IV Continuous <Continuous>  influenza   Vaccine 0.5 milliLiter(s) IntraMuscular once  metoprolol succinate ER 25 milliGRAM(s) Oral daily  warfarin 7.5 milliGRAM(s) Oral once    IV heparin infusion with mechanical  MVR, until INR 2.5 - 3.5  f/u labs.    Montana Barreto MD, FACC

## 2021-01-02 NOTE — PROGRESS NOTE ADULT - SUBJECTIVE AND OBJECTIVE BOX
Patient is a 70y old  Female who presents with a chief complaint of chest pain (31 Dec 2020 09:24)    PAST MEDICAL & SURGICAL HISTORY:    CHF (congestive heart failure)    Gout    HTN (hypertension)    COPD (chronic obstructive pulmonary disease)  on home O2    MIGUEL (obstructive sleep apnea)    Pulmonary hypertension    Atrial fibrillation  S/P Ablation    Tricuspid valve replacement 2012    History of mitral valve replacement with mechanical valve 1999    INTERVAL HISTORY: resting in bed, feeling better.      Vitals:  Vital Signs Last 24 Hrs  Vital Signs Last 24 Hrs  T(C): 37.1 (02 Jan 2021 18:03), Max: 37.4 (02 Jan 2021 00:52)  T(F): 98.8 (02 Jan 2021 18:03), Max: 99.3 (02 Jan 2021 00:52)  HR: 91 (02 Jan 2021 18:03) (71 - 91)  BP: 132/64 (02 Jan 2021 18:03) (101/59 - 153/78)  RR: 18 (02 Jan 2021 18:03) (17 - 18)  SpO2: 99% (02 Jan 2021 18:03) (97% - 100%)    PHYSICAL EXAM:  Neuro: Awake, responsive  CV: S1 S2 RRR + mechanical click    Lungs: few rales to bases   GI: Soft, BS +, ND, NT  Extremities: + LE edema    TELEMETRY: atrial fibrillation periods of RVR    RADIOLOGY: < from: Xray Chest 1 View- PORTABLE-Urgent (Xray Chest 1 View- PORTABLE-Urgent .) (12.29.20 @ 16:57) >   Heart enlargement, heart surgery, and mild central CHF.    < end of copied text >    DIAGNOSTIC TESTING:    [x ] Echocardiogram: < from: TTE with Doppler (w/Cont) (10.04.20 @ 09:52) >  Mitral Valve: Mechanical prosthetic mitral valve  replacement. Mean transmitral valve gradient equals 8 mm  Hg, which is elevated even in the setting of a mechanical  prosthetic mitral valve replacement.  Aortic Valve/Aorta: Aortic valve not well visualized;  appears calcified. Peak transaortic valve gradient equals  10 mm Hg, mean transaortic valve gradient equals 6 mm Hg,  aortic valve velocity time integral equals 27 cm. Minimal  aortic regurgitation.  Peak left ventricular outflow tract  gradient equals 3 mm Hg, mean gradient is equal to 2 mm Hg,  LVOT velocity timeintegral equals 13 cm.  Aortic Root: 3.4 cm.  LVOT diameter: 2.2 cm.  Left Atrium: Left atrial enlargement  Left Ventricle: Normal left ventricular systolic function.  No segmental wall motion abnormalities. Endocardial  visualization enhanced with intravenous injection of  Ultrasonic Enhancing Agent (Definity).  Septal motion  consistent with cardiac surgery.  Right Heart: Right atrial enlargement The right ventricle  is not well visualized appears enlarged.. Normal tricuspid  valve. Minimal tricuspid regurgitation.  Pericardium/Pleura: Normal pericardium with no pericardial  effusion.  Hemodynamic: Estimated right atrial pressure is 8 mm Hg.  ------------------------------------------------------------------------  Conclusions:  1. Mechanical prosthetic mitral valve replacement. Mean  transmitral valve gradient equals 8 mm Hg, which is  elevated even in the setting of a mechanical prosthetic  mitral valve replacement.  2. Aortic valve not well visualized; appears calcified.  Peak transaortic valve gradient equals 10 mm Hg, mean  transaortic valve gradient equals 6 mm Hg, aortic valve  velocity time integral equals 27 cm. Minimal aortic  regurgitation.  3. Normal left ventricular systolic function. No segmental  wall motion abnormalities. Endocardial visualization  enhanced with intravenous injection of Ultrasonic Enhancing  Agent (Definity).  Septal motion consistent with cardiac  surgery.  4. The right ventricle is not well visualized appears  enlarged..    < end of copied text >    LABS:	 	                          7.1    8.64  )-----------( 362      ( 02 Jan 2021 06:57 )             25.8     01-02    135  |  96  |  47<H>  ----------------------------<  89  3.9   |  37<H>  |  1.46<H>    Ca    9.2      02 Jan 2021 06:57          < from: TTE Echo Complete w/ Contrast w/ Doppler (12.31.20 @ 14:01) >  Summary:   1. Left ventricular ejection fraction, by visual estimation, is 60 to 65%.   2. Technically suboptimal study.   3. Normal global left ventricular systolic function.   4. Normal left ventricular internal cavity size.   5. Spectral Doppler shows pseudonormal pattern of left ventricular myocardial filling (Grade II diastolic dysfunction).   6. Normal right ventricular size and function.   7. Mild to moderately enlarged left atrium.   8. There is no evidence of pericardial effusion.   9. Mild mitral annular calcification.  10. Mild mitral valve regurgitation.  11. Mild thickening and calcification of the anterior and posterior mitral valve leaflets.  12. Peak transmitral mean gradient equals 6.8 mmHg, calculated mitral valve area by pressure half time equals 1.86 cm² consistent withmild mitral stenosis.  13. Mild tricuspid regurgitation.  14. Mild aortic regurgitation.  15. Sclerotic aortic valve with normal opening.  16. Estimated pulmonary artery systolic pressure is 68.4 mmHg assuming a right atrial pressure of 8 mmHg, whichis consistent with severe pulmonary hypertension.  17. C/w the study of 6-21-20, more significant pulmonary htn is now noted.  18. Endocardial visualization was enhanced with intravenous echo contrast.  19. Mitral valve mean gradient is 6.8 mmHg consistent with moderate mitral stenosis.    < end of copied text >

## 2021-01-02 NOTE — PROGRESS NOTE ADULT - SUBJECTIVE AND OBJECTIVE BOX
Patient is a 70y old  Female who presents with a chief complaint of chest pain (01 Jan 2021 19:01)      INTERVAL HPI/OVERNIGHT EVENTS: no events     MEDICATIONS  (STANDING):  allopurinol 100 milliGRAM(s) Oral daily  atorvastatin 40 milliGRAM(s) Oral at bedtime  budesonide 160 MICROgram(s)/formoterol 4.5 MICROgram(s) Inhaler 2 Puff(s) Inhalation two times a day  furosemide    Tablet 40 milliGRAM(s) Oral two times a day  heparin  Infusion.  Unit(s)/Hr (19 mL/Hr) IV Continuous <Continuous>  influenza   Vaccine 0.5 milliLiter(s) IntraMuscular once  metoprolol succinate ER 25 milliGRAM(s) Oral daily  warfarin 7.5 milliGRAM(s) Oral once    MEDICATIONS  (PRN):  heparin   Injectable 9000 Unit(s) IV Push every 6 hours PRN For aPTT less than 40  heparin   Injectable 4000 Unit(s) IV Push every 6 hours PRN For aPTT between 40 - 57      Allergies    No Known Allergies    Intolerances       Vital Signs Last 24 Hrs  T(C): 36.6 (02 Jan 2021 10:41), Max: 37.4 (01 Jan 2021 16:54)  T(F): 97.9 (02 Jan 2021 10:41), Max: 99.3 (01 Jan 2021 16:54)  HR: 84 (02 Jan 2021 10:41) (69 - 84)  BP: 101/59 (02 Jan 2021 10:41) (101/59 - 142/83)  BP(mean): --  RR: 17 (02 Jan 2021 10:41) (17 - 18)  SpO2: 97% (02 Jan 2021 10:41) (94% - 100%)    PHYSICAL EXAM:  GENERAL: NAD, well-groomed, well-developed  HEAD:  Atraumatic, Normocephalic  EYES: EOMI, PERRLA, conjunctiva and sclera clear  ENMT: No tonsillar erythema, exudates, or enlargement; Moist mucous membranes, Good dentition, No lesions  NECK: Supple, No JVD, Normal thyroid  NERVOUS SYSTEM:  Alert & Oriented X3, Good concentration; Motor Strength 5/5 B/L upper and lower extremities; DTRs 2+ intact and symmetric  CHEST/LUNG: Clear to percussion bilaterally; No rales, rhonchi, wheezing, or rubs  HEART: Regular rate and rhythm; No murmurs, rubs, or gallops  ABDOMEN: Soft, Nontender, Nondistended; Bowel sounds present  EXTREMITIES:  2+ Peripheral Pulses, No clubbing, cyanosis, or edema  LYMPH: No lymphadenopathy noted  SKIN: No rashes or lesions    LABS:                        7.1    8.64  )-----------( 362      ( 02 Jan 2021 06:57 )             25.8     01-02    135  |  96  |  47<H>  ----------------------------<  89  3.9   |  37<H>  |  1.46<H>    Ca    9.2      02 Jan 2021 06:57      PT/INR - ( 02 Jan 2021 06:57 )   PT: 20.8 sec;   INR: 1.85 ratio         PTT - ( 02 Jan 2021 06:57 )  PTT:70.8 sec    CAPILLARY BLOOD GLUCOSE          RADIOLOGY & ADDITIONAL TESTS:    Imaging Personally Reviewed:  [ X] YES  [ ] NO    Consultant(s) Notes Reviewed:  [ X] YES  [ ] NO    Care Discussed with Consultants/Other Providers [X ] YES  [ ] NO

## 2021-01-02 NOTE — PROGRESS NOTE ADULT - ASSESSMENT
Subjective Complaints:  Historian:             Vital Signs Last 24 Hrs  T(C): 37.1 (02 Jan 2021 18:03), Max: 37.4 (02 Jan 2021 00:52)  T(F): 98.8 (02 Jan 2021 18:03), Max: 99.3 (02 Jan 2021 00:52)  HR: 91 (02 Jan 2021 18:03) (71 - 91)  BP: 132/64 (02 Jan 2021 18:03) (101/59 - 153/78)  BP(mean): --  RR: 18 (02 Jan 2021 18:03) (17 - 18)  SpO2: 99% (02 Jan 2021 18:03) (97% - 100%)    GENERAL PHYSICAL EXAM:  General:  Appears stated age, well-groomed, well-nourished, no distress  HEENT:  NC/AT, patent nares w/ pink mucosa, OP clear w/o lesions, PERRL, EOMI, conjunctivae clear, no thyromegaly, nodules, adenopathy, no JVD  Chest:  Full & symmetric excursion, no increased effort, breath sounds clear  Cardiovascular:  Regular rhythm, S1, S2, no murmur/rub/S3/S4, no carotid/femoral/abdominal bruit, radial/pedal pulses 2+, no edema  Abdomen:  Soft, non-tender, non-distended, normoactive bowel sounds, no HSM  Extremities:  Gait & station:   Digits:   Nails:   Joints, Bones, Muscles:   ROM:   Stability:  Skin:  No rash/erythema/ecchymoses/petechiae/wounds/abscess/warm/dry  Musculoskeletal:  Full ROM in all joints w/o swelling/tenderness/effusion        LABS:                        7.1    8.64  )-----------( 362      ( 02 Jan 2021 06:57 )             25.8     01-02    135  |  96  |  47<H>  ----------------------------<  89  3.9   |  37<H>  |  1.46<H>    Ca    9.2      02 Jan 2021 06:57      PT/INR - ( 02 Jan 2021 06:57 )   PT: 20.8 sec;   INR: 1.85 ratio         PTT - ( 02 Jan 2021 06:57 )  PTT:70.8 sec      RADIOLOGY & ADDITIONAL STUDIES:        Neurology Progress Note:      Mental Status: awaek laert  speech fleunt   follows commands       Cranial Nerves: 2 12/intact      Motor:   arm leg 4/5         Sensory: intact      Cerebellar: defrd      Gait: unsteady       Assesment/Plan:  s/p dizziness sob on o2 nasal cannula  on coumadin morbid obesity  unsteady gait  will follow

## 2021-01-03 LAB
ALBUMIN SERPL ELPH-MCNC: 3.1 G/DL — LOW (ref 3.3–5)
ALP SERPL-CCNC: 105 U/L — SIGNIFICANT CHANGE UP (ref 40–120)
ALT FLD-CCNC: 16 U/L — SIGNIFICANT CHANGE UP (ref 12–78)
ANION GAP SERPL CALC-SCNC: 6 MMOL/L — SIGNIFICANT CHANGE UP (ref 5–17)
APTT BLD: 160.7 SEC — CRITICAL HIGH (ref 27.5–35.5)
APTT BLD: 50 SEC — HIGH (ref 27.5–35.5)
AST SERPL-CCNC: 18 U/L — SIGNIFICANT CHANGE UP (ref 15–37)
BILIRUB SERPL-MCNC: 0.9 MG/DL — SIGNIFICANT CHANGE UP (ref 0.2–1.2)
BUN SERPL-MCNC: 51 MG/DL — HIGH (ref 7–23)
CALCIUM SERPL-MCNC: 9.1 MG/DL — SIGNIFICANT CHANGE UP (ref 8.5–10.1)
CHLORIDE SERPL-SCNC: 99 MMOL/L — SIGNIFICANT CHANGE UP (ref 96–108)
CO2 SERPL-SCNC: 32 MMOL/L — HIGH (ref 22–31)
CREAT SERPL-MCNC: 1.47 MG/DL — HIGH (ref 0.5–1.3)
GLUCOSE SERPL-MCNC: 99 MG/DL — SIGNIFICANT CHANGE UP (ref 70–99)
HCT VFR BLD CALC: 29.5 % — LOW (ref 34.5–45)
HGB BLD-MCNC: 8.3 G/DL — LOW (ref 11.5–15.5)
INR BLD: 1.77 RATIO — HIGH (ref 0.88–1.16)
MCHC RBC-ENTMCNC: 25.6 PG — LOW (ref 27–34)
MCHC RBC-ENTMCNC: 28.1 GM/DL — LOW (ref 32–36)
MCV RBC AUTO: 91 FL — SIGNIFICANT CHANGE UP (ref 80–100)
NRBC # BLD: 0 /100 WBCS — SIGNIFICANT CHANGE UP (ref 0–0)
PLATELET # BLD AUTO: 394 K/UL — SIGNIFICANT CHANGE UP (ref 150–400)
POTASSIUM SERPL-MCNC: 4.4 MMOL/L — SIGNIFICANT CHANGE UP (ref 3.5–5.3)
POTASSIUM SERPL-SCNC: 4.4 MMOL/L — SIGNIFICANT CHANGE UP (ref 3.5–5.3)
PROT SERPL-MCNC: 8.6 GM/DL — HIGH (ref 6–8.3)
PROTHROM AB SERPL-ACNC: 20 SEC — HIGH (ref 10.6–13.6)
RBC # BLD: 3.24 M/UL — LOW (ref 3.8–5.2)
RBC # FLD: 17 % — HIGH (ref 10.3–14.5)
SODIUM SERPL-SCNC: 137 MMOL/L — SIGNIFICANT CHANGE UP (ref 135–145)
WBC # BLD: 9.83 K/UL — SIGNIFICANT CHANGE UP (ref 3.8–10.5)
WBC # FLD AUTO: 9.83 K/UL — SIGNIFICANT CHANGE UP (ref 3.8–10.5)

## 2021-01-03 PROCEDURE — 99233 SBSQ HOSP IP/OBS HIGH 50: CPT

## 2021-01-03 RX ORDER — WARFARIN SODIUM 2.5 MG/1
10 TABLET ORAL ONCE
Refills: 0 | Status: COMPLETED | OUTPATIENT
Start: 2021-01-03 | End: 2021-01-03

## 2021-01-03 RX ADMIN — Medication 40 MILLIGRAM(S): at 05:39

## 2021-01-03 RX ADMIN — HEPARIN SODIUM 1500 UNIT(S)/HR: 5000 INJECTION INTRAVENOUS; SUBCUTANEOUS at 09:32

## 2021-01-03 RX ADMIN — ATORVASTATIN CALCIUM 40 MILLIGRAM(S): 80 TABLET, FILM COATED ORAL at 21:26

## 2021-01-03 RX ADMIN — HEPARIN SODIUM 0 UNIT(S)/HR: 5000 INJECTION INTRAVENOUS; SUBCUTANEOUS at 16:34

## 2021-01-03 RX ADMIN — Medication 40 MILLIGRAM(S): at 17:43

## 2021-01-03 RX ADMIN — WARFARIN SODIUM 10 MILLIGRAM(S): 2.5 TABLET ORAL at 21:26

## 2021-01-03 RX ADMIN — BUDESONIDE AND FORMOTEROL FUMARATE DIHYDRATE 2 PUFF(S): 160; 4.5 AEROSOL RESPIRATORY (INHALATION) at 17:43

## 2021-01-03 RX ADMIN — Medication 25 MILLIGRAM(S): at 05:39

## 2021-01-03 RX ADMIN — HEPARIN SODIUM 4000 UNIT(S): 5000 INJECTION INTRAVENOUS; SUBCUTANEOUS at 09:37

## 2021-01-03 RX ADMIN — Medication 100 MILLIGRAM(S): at 11:23

## 2021-01-03 RX ADMIN — HEPARIN SODIUM 1200 UNIT(S)/HR: 5000 INJECTION INTRAVENOUS; SUBCUTANEOUS at 17:41

## 2021-01-03 RX ADMIN — BUDESONIDE AND FORMOTEROL FUMARATE DIHYDRATE 2 PUFF(S): 160; 4.5 AEROSOL RESPIRATORY (INHALATION) at 06:44

## 2021-01-03 NOTE — PROGRESS NOTE ADULT - SUBJECTIVE AND OBJECTIVE BOX
Patient is a 70y old  Female who presents with a chief complaint of chest pain (31 Dec 2020 09:24)    PAST MEDICAL & SURGICAL HISTORY:    CHF (congestive heart failure)    Gout    HTN (hypertension)    COPD (chronic obstructive pulmonary disease)  on home O2    MIGUEL (obstructive sleep apnea)    Pulmonary hypertension    Atrial fibrillation  S/P Ablation    Tricuspid valve replacement 2012    History of mitral valve replacement with mechanical valve 1999    INTERVAL HISTORY: resting in bed, feeling better.      Vitals:  Vital Signs Last 24 Hrs  T(C): 36.9 (03 Jan 2021 15:39), Max: 37.1 (02 Jan 2021 18:03)  T(F): 98.5 (03 Jan 2021 15:39), Max: 98.8 (02 Jan 2021 18:03)  HR: 67 (03 Jan 2021 15:39) (67 - 91)  BP: 123/68 (03 Jan 2021 15:39) (109/63 - 135/67)  RR: 18 (03 Jan 2021 15:39) (17 - 18)  SpO2: 98% (03 Jan 2021 15:39) (97% - 100%)    PHYSICAL EXAM:  Neuro: Awake, responsive  CV: S1 S2 RRR + mechanical click    Lungs: few rales to bases   GI: Soft, BS +, ND, NT  Extremities: + LE edema    TELEMETRY: atrial fibrillation    RADIOLOGY: < from: Xray Chest 1 View- PORTABLE-Urgent (Xray Chest 1 View- PORTABLE-Urgent .) (12.29.20 @ 16:57) >   Heart enlargement, heart surgery, and mild central CHF.    < end of copied text >    DIAGNOSTIC TESTING:    [x ] Echocardiogram: < from: TTE with Doppler (w/Cont) (10.04.20 @ 09:52) >  Mitral Valve: Mechanical prosthetic mitral valve  replacement. Mean transmitral valve gradient equals 8 mm  Hg, which is elevated even in the setting of a mechanical  prosthetic mitral valve replacement.  Aortic Valve/Aorta: Aortic valve not well visualized;  appears calcified. Peak transaortic valve gradient equals  10 mm Hg, mean transaortic valve gradient equals 6 mm Hg,  aortic valve velocity time integral equals 27 cm. Minimal  aortic regurgitation.  Peak left ventricular outflow tract  gradient equals 3 mm Hg, mean gradient is equal to 2 mm Hg,  LVOT velocity timeintegral equals 13 cm.  Aortic Root: 3.4 cm.  LVOT diameter: 2.2 cm.  Left Atrium: Left atrial enlargement  Left Ventricle: Normal left ventricular systolic function.  No segmental wall motion abnormalities. Endocardial  visualization enhanced with intravenous injection of  Ultrasonic Enhancing Agent (Definity).  Septal motion  consistent with cardiac surgery.  Right Heart: Right atrial enlargement The right ventricle  is not well visualized appears enlarged.. Normal tricuspid  valve. Minimal tricuspid regurgitation.  Pericardium/Pleura: Normal pericardium with no pericardial  effusion.  Hemodynamic: Estimated right atrial pressure is 8 mm Hg.  ------------------------------------------------------------------------  Conclusions:  1. Mechanical prosthetic mitral valve replacement. Mean  transmitral valve gradient equals 8 mm Hg, which is  elevated even in the setting of a mechanical prosthetic  mitral valve replacement.  2. Aortic valve not well visualized; appears calcified.  Peak transaortic valve gradient equals 10 mm Hg, mean  transaortic valve gradient equals 6 mm Hg, aortic valve  velocity time integral equals 27 cm. Minimal aortic  regurgitation.  3. Normal left ventricular systolic function. No segmental  wall motion abnormalities. Endocardial visualization  enhanced with intravenous injection of Ultrasonic Enhancing  Agent (Definity).  Septal motion consistent with cardiac  surgery.  4. The right ventricle is not well visualized appears  enlarged..    < end of copied text >    LABS:	 	                          7.1    8.64  )-----------( 362      ( 02 Jan 2021 06:57 )             25.8     01-02    135  |  96  |  47<H>  ----------------------------<  89  3.9   |  37<H>  |  1.46<H>    Ca    9.2      02 Jan 2021 06:57          < from: TTE Echo Complete w/ Contrast w/ Doppler (12.31.20 @ 14:01) >  Summary:   1. Left ventricular ejection fraction, by visual estimation, is 60 to 65%.   2. Technically suboptimal study.   3. Normal global left ventricular systolic function.   4. Normal left ventricular internal cavity size.   5. Spectral Doppler shows pseudonormal pattern of left ventricular myocardial filling (Grade II diastolic dysfunction).   6. Normal right ventricular size and function.   7. Mild to moderately enlarged left atrium.   8. There is no evidence of pericardial effusion.   9. Mild mitral annular calcification.  10. Mild mitral valve regurgitation.  11. Mild thickening and calcification of the anterior and posterior mitral valve leaflets.  12. Peak transmitral mean gradient equals 6.8 mmHg, calculated mitral valve area by pressure half time equals 1.86 cm² consistent withmild mitral stenosis.  13. Mild tricuspid regurgitation.  14. Mild aortic regurgitation.  15. Sclerotic aortic valve with normal opening.  16. Estimated pulmonary artery systolic pressure is 68.4 mmHg assuming a right atrial pressure of 8 mmHg, whichis consistent with severe pulmonary hypertension.  17. C/w the study of 6-21-20, more significant pulmonary htn is now noted.  18. Endocardial visualization was enhanced with intravenous echo contrast.  19. Mitral valve mean gradient is 6.8 mmHg consistent with moderate mitral stenosis.    < end of copied text >

## 2021-01-03 NOTE — PROGRESS NOTE ADULT - ASSESSMENT
70 year old female with PMH of CAD with Mitral and tricuspid valve repair hx  on coumadin, INR goal 2.5-3.5, afib s/p ablation, COPD on 3L home O2, HTN, HLD, Gout, CKD 3,  otherwise presenting to ED due to chest pain on left with associated dizziness, mild headaches and SOB with exertion for past 1 week without fever/chills but symptoms worsening so pt came in for evaluation.       Pre-syncope.    -Possibly multifactorial  -Afib/anemia  -orthostatics- + from laying to sitting   -2D echo- reviewed preserved EF  -will order carotids of neck  -seen by neuro   -Heparin drip on top of Coumadin      Chronic Atrial fibrillation  -HR normal, BP stable  -dig level-1.11 wnl  -lowered metoprolol to 25mg as per Cards   -Heparin drip on top of Coumadin  , close monitoring of INR          CAD with mechanical prosthetic MVR and tricuspid valve repair  INR subtheraputic (norm 2.5-3.5)   -continue Heparin drip and Coumadin       Chest pain.  Plan: -r/o acs  -trend trops x 3 negative  -pt denies chest pain currently      COPD    cont home meds.     Anemia  -continue to monitor closely H/H -continues to be stable    Epistaxis  -s/p packing  -resolved    w/ Acute blood loss anemia, hx of cardiac dz s/p 1un prbc     PT EVAL         DVT ppx-Coumadin and Heparin drip

## 2021-01-03 NOTE — PROGRESS NOTE ADULT - NUTRITIONAL ASSESSMENT
This patient has been assessed with a concern for Malnutrition and has been determined to have a diagnosis/diagnoses of Morbid obesity (BMI > 40).    This patient is being managed with:   Diet DASH/TLC-  Sodium & Cholesterol Restricted  Supplement Feeding Modality:  Oral  Suplena Cans or Servings Per Day:  1       Frequency:  Daily  Entered: Dec 31 2020  4:19PM    

## 2021-01-03 NOTE — PROGRESS NOTE ADULT - ASSESSMENT
She 70 years of age with history of CAD, mechanical MVR and tricuspid valve repair, status post atrial fibrillation ablation back in atrial fibrillation at times rapid ventricular response, COPD, hypertension, dyslipidemia, gout, chronic kidney disease presented with chest pain dizziness and shortness of breath evidence of tachybradycardia times adjustments of medications with improvement in atrial fibrillation status. She also had epistaxis requiring packed red blood cell support.    PLAN:     Continue telemetry monitoring to assess dysrhythmias. Atrial fibrillation seems rate managed today.  She will stay on Lasix 40 mg p.o. b.i.d. to help keep negative fluid status. Stay metoprolol ER 25 mg once daily and she remains off digoxin.   Continue IV heparin and warfarin with goal INR of 2.5-3.5 with mechanical MVR. Awaiting therapeutic INR values. Stay on atorvastatin 40 mg daily for lipid lowering. No clear evidence of ACS are present.  Activity as tolerated.      Montana Barreto MD, FACC

## 2021-01-03 NOTE — PROGRESS NOTE ADULT - SUBJECTIVE AND OBJECTIVE BOX
Patient is a 70y old  Female who presents with a chief complaint of chest pain (02 Jan 2021 19:55)      INTERVAL HPI/OVERNIGHT EVENTS: no events     MEDICATIONS  (STANDING):  allopurinol 100 milliGRAM(s) Oral daily  atorvastatin 40 milliGRAM(s) Oral at bedtime  budesonide 160 MICROgram(s)/formoterol 4.5 MICROgram(s) Inhaler 2 Puff(s) Inhalation two times a day  furosemide    Tablet 40 milliGRAM(s) Oral two times a day  heparin  Infusion.  Unit(s)/Hr (19 mL/Hr) IV Continuous <Continuous>  influenza   Vaccine 0.5 milliLiter(s) IntraMuscular once  metoprolol succinate ER 25 milliGRAM(s) Oral daily  warfarin 10 milliGRAM(s) Oral once    MEDICATIONS  (PRN):  heparin   Injectable 9000 Unit(s) IV Push every 6 hours PRN For aPTT less than 40  heparin   Injectable 4000 Unit(s) IV Push every 6 hours PRN For aPTT between 40 - 57      Allergies    No Known Allergies    Intolerances         Vital Signs Last 24 Hrs  T(C): 36.9 (03 Jan 2021 04:49), Max: 37.2 (02 Jan 2021 17:42)  T(F): 98.4 (03 Jan 2021 04:49), Max: 98.9 (02 Jan 2021 17:42)  HR: 72 (03 Jan 2021 04:49) (72 - 91)  BP: 129/72 (03 Jan 2021 04:49) (101/59 - 153/78)  BP(mean): --  RR: 18 (03 Jan 2021 04:49) (17 - 18)  SpO2: 98% (03 Jan 2021 04:49) (97% - 100%)    PHYSICAL EXAM:  GENERAL: NAD, well-groomed, well-developed  HEAD:  Atraumatic, Normocephalic  EYES: EOMI, PERRLA, conjunctiva and sclera clear  ENMT: No tonsillar erythema, exudates, or enlargement; Moist mucous membranes, Good dentition, No lesions  NECK: Supple, No JVD, Normal thyroid  NERVOUS SYSTEM:  Alert & Oriented X3, Good concentration; Motor Strength 5/5 B/L upper and lower extremities; DTRs 2+ intact and symmetric  CHEST/LUNG: Clear to percussion bilaterally; No rales, rhonchi, wheezing, or rubs  HEART: Regular rate and rhythm; No murmurs, rubs, or gallops  ABDOMEN: Soft, Nontender, Nondistended; Bowel sounds present  EXTREMITIES:  2+ Peripheral Pulses, No clubbing, cyanosis, or edema  LYMPH: No lymphadenopathy noted  SKIN: No rashes or lesions    LABS:                        8.3    9.83  )-----------( 394      ( 03 Jan 2021 07:23 )             29.5     01-03    137  |  99  |  51<H>  ----------------------------<  99  4.4   |  32<H>  |  1.47<H>    Ca    9.1      03 Jan 2021 07:23    TPro  8.6<H>  /  Alb  3.1<L>  /  TBili  0.9  /  DBili  x   /  AST  18  /  ALT  16  /  AlkPhos  105  01-03    PT/INR - ( 03 Jan 2021 07:23 )   PT: 20.0 sec;   INR: 1.77 ratio         PTT - ( 03 Jan 2021 07:23 )  PTT:50.0 sec    CAPILLARY BLOOD GLUCOSE          RADIOLOGY & ADDITIONAL TESTS:    Imaging Personally Reviewed:  [ X] YES  [ ] NO    Consultant(s) Notes Reviewed:  [ X] YES  [ ] NO    Care Discussed with Consultants/Other Providers [X ] YES  [ ] NO

## 2021-01-03 NOTE — PROGRESS NOTE ADULT - ASSESSMENT
Subjective Complaints:  Historian:             Vital Signs Last 24 Hrs  T(C): 36.9 (03 Jan 2021 15:39), Max: 36.9 (03 Jan 2021 04:49)  T(F): 98.5 (03 Jan 2021 15:39), Max: 98.5 (03 Jan 2021 15:39)  HR: 67 (03 Jan 2021 15:39) (67 - 89)  BP: 123/68 (03 Jan 2021 15:39) (109/63 - 135/67)  BP(mean): --  RR: 18 (03 Jan 2021 15:39) (17 - 18)  SpO2: 98% (03 Jan 2021 15:39) (97% - 100%)    GENERAL PHYSICAL EXAM:  General:  Appears stated age, well-groomed, well-nourished, no distress  HEENT:  NC/AT, patent nares w/ pink mucosa, OP clear w/o lesions, PERRL, EOMI, conjunctivae clear, no thyromegaly, nodules, adenopathy, no JVD  Chest:  Full & symmetric excursion, no increased effort, breath sounds clear  Cardiovascular:  Regular rhythm, S1, S2, no murmur/rub/S3/S4, no carotid/femoral/abdominal bruit, radial/pedal pulses 2+, no edema  Abdomen:  Soft, non-tender, non-distended, normoactive bowel sounds, no HSM  Extremities:  Gait & station:   Digits:   Nails:   Joints, Bones, Muscles:   ROM:   Stability:  Skin:  No rash/erythema/ecchymoses/petechiae/wounds/abscess/warm/dry  Musculoskeletal:  Full ROM in all joints w/o swelling/tenderness/effusion        LABS:                        8.3    9.83  )-----------( 394      ( 03 Jan 2021 07:23 )             29.5     01-03    137  |  99  |  51<H>  ----------------------------<  99  4.4   |  32<H>  |  1.47<H>    Ca    9.1      03 Jan 2021 07:23    TPro  8.6<H>  /  Alb  3.1<L>  /  TBili  0.9  /  DBili  x   /  AST  18  /  ALT  16  /  AlkPhos  105  01-03    PT/INR - ( 03 Jan 2021 07:23 )   PT: 20.0 sec;   INR: 1.77 ratio         PTT - ( 03 Jan 2021 15:53 )  PTT:160.7 sec      RADIOLOGY & ADDITIONAL STUDIES:        Neurology Progress Note:      Mental Status: awaek akert speech fluent       Cranial Nerves: 2 12.ibntact      Motor:   arm leg 4/5         Sensory:intact      Cerebellar: defrd      Gait:unsteady       Assesment/Plan: copd no dizziness sitting in chair on coumadin for  mitral zunilda and tricuspid repair ct head no acute path

## 2021-01-03 NOTE — PROVIDER CONTACT NOTE (CRITICAL VALUE NOTIFICATION) - ACTION/TREATMENT ORDERED:
protocol followed will endorse to incoming RN.
delay 60mins & decreased to 13ml/hr
delay infusion for 60mins then decrease by 300ml/hr

## 2021-01-04 LAB
ANION GAP SERPL CALC-SCNC: 5 MMOL/L — SIGNIFICANT CHANGE UP (ref 5–17)
APTT BLD: 95.4 SEC — HIGH (ref 27.5–35.5)
BUN SERPL-MCNC: 56 MG/DL — HIGH (ref 7–23)
CALCIUM SERPL-MCNC: 9 MG/DL — SIGNIFICANT CHANGE UP (ref 8.5–10.1)
CHLORIDE SERPL-SCNC: 98 MMOL/L — SIGNIFICANT CHANGE UP (ref 96–108)
CO2 SERPL-SCNC: 34 MMOL/L — HIGH (ref 22–31)
CREAT SERPL-MCNC: 1.66 MG/DL — HIGH (ref 0.5–1.3)
GLUCOSE SERPL-MCNC: 129 MG/DL — HIGH (ref 70–99)
HCT VFR BLD CALC: 28.3 % — LOW (ref 34.5–45)
HGB BLD-MCNC: 7.9 G/DL — LOW (ref 11.5–15.5)
INR BLD: 2 RATIO — HIGH (ref 0.88–1.16)
MCHC RBC-ENTMCNC: 25.8 PG — LOW (ref 27–34)
MCHC RBC-ENTMCNC: 27.9 GM/DL — LOW (ref 32–36)
MCV RBC AUTO: 92.5 FL — SIGNIFICANT CHANGE UP (ref 80–100)
NRBC # BLD: 0 /100 WBCS — SIGNIFICANT CHANGE UP (ref 0–0)
PLATELET # BLD AUTO: 361 K/UL — SIGNIFICANT CHANGE UP (ref 150–400)
POTASSIUM SERPL-MCNC: 4.3 MMOL/L — SIGNIFICANT CHANGE UP (ref 3.5–5.3)
POTASSIUM SERPL-SCNC: 4.3 MMOL/L — SIGNIFICANT CHANGE UP (ref 3.5–5.3)
PROTHROM AB SERPL-ACNC: 22.5 SEC — HIGH (ref 10.6–13.6)
RBC # BLD: 3.06 M/UL — LOW (ref 3.8–5.2)
RBC # FLD: 16.7 % — HIGH (ref 10.3–14.5)
SODIUM SERPL-SCNC: 137 MMOL/L — SIGNIFICANT CHANGE UP (ref 135–145)
WBC # BLD: 8.6 K/UL — SIGNIFICANT CHANGE UP (ref 3.8–10.5)
WBC # FLD AUTO: 8.6 K/UL — SIGNIFICANT CHANGE UP (ref 3.8–10.5)

## 2021-01-04 PROCEDURE — 99233 SBSQ HOSP IP/OBS HIGH 50: CPT

## 2021-01-04 PROCEDURE — 99232 SBSQ HOSP IP/OBS MODERATE 35: CPT

## 2021-01-04 RX ORDER — SODIUM CHLORIDE 0.65 %
1 AEROSOL, SPRAY (ML) NASAL EVERY 6 HOURS
Refills: 0 | Status: DISCONTINUED | OUTPATIENT
Start: 2021-01-04 | End: 2021-01-05

## 2021-01-04 RX ORDER — FUROSEMIDE 40 MG
20 TABLET ORAL ONCE
Refills: 0 | Status: COMPLETED | OUTPATIENT
Start: 2021-01-04 | End: 2021-01-04

## 2021-01-04 RX ORDER — WARFARIN SODIUM 2.5 MG/1
5 TABLET ORAL ONCE
Refills: 0 | Status: COMPLETED | OUTPATIENT
Start: 2021-01-04 | End: 2021-01-04

## 2021-01-04 RX ORDER — FUROSEMIDE 40 MG
40 TABLET ORAL
Refills: 0 | Status: DISCONTINUED | OUTPATIENT
Start: 2021-01-04 | End: 2021-01-05

## 2021-01-04 RX ADMIN — BUDESONIDE AND FORMOTEROL FUMARATE DIHYDRATE 2 PUFF(S): 160; 4.5 AEROSOL RESPIRATORY (INHALATION) at 06:07

## 2021-01-04 RX ADMIN — HEPARIN SODIUM 1200 UNIT(S)/HR: 5000 INJECTION INTRAVENOUS; SUBCUTANEOUS at 07:36

## 2021-01-04 RX ADMIN — BUDESONIDE AND FORMOTEROL FUMARATE DIHYDRATE 2 PUFF(S): 160; 4.5 AEROSOL RESPIRATORY (INHALATION) at 17:42

## 2021-01-04 RX ADMIN — HEPARIN SODIUM 1200 UNIT(S)/HR: 5000 INJECTION INTRAVENOUS; SUBCUTANEOUS at 00:07

## 2021-01-04 RX ADMIN — Medication 40 MILLIGRAM(S): at 05:17

## 2021-01-04 RX ADMIN — Medication 20 MILLIGRAM(S): at 14:23

## 2021-01-04 RX ADMIN — Medication 25 MILLIGRAM(S): at 05:17

## 2021-01-04 RX ADMIN — ATORVASTATIN CALCIUM 40 MILLIGRAM(S): 80 TABLET, FILM COATED ORAL at 21:28

## 2021-01-04 RX ADMIN — Medication 100 MILLIGRAM(S): at 14:23

## 2021-01-04 RX ADMIN — Medication 40 MILLIGRAM(S): at 18:48

## 2021-01-04 RX ADMIN — WARFARIN SODIUM 5 MILLIGRAM(S): 2.5 TABLET ORAL at 21:28

## 2021-01-04 NOTE — PROGRESS NOTE ADULT - ASSESSMENT
70 year old Female with history of CAD, mechanical MVR andTVR, status post atrial fibrillation ablation back in atrial fibrillation at times rapid ventricular response, COPD, hypertension, dyslipidemia, gout, chronic kidney disease presented with chest pain dizziness and shortness of breath evidence of tachybradycardia times, adjustments of medications with improvement in atrial fibrillation status. She also had epistaxis requiring packed red blood cell support.  Pt also with subtherapeutic INR requiring IV heparin for AC in setting of mechanical Valve  Echo with preserved EF, Grade II DD, mild MR/TR/AR, mod MS    PLAN:     Continue telemetry monitoring to assess dysrhythmias. Atrial fibrillation seems rate controlled  Cont on Lasix 40 mg p.o. b.i.d. to help keep negative fluid status.   Cont Toprol 25 mg once daily and  remains off digoxin.   Continue IV heparin and warfarin with goal INR of 2.5-3.5 with mechanical MVR. Awaiting therapeutic INR values.  Cont on atorvastatin 40 mg daily for lipid lowering. No clear evidence of ACS are present.  Activity as tolerated.       70 year old Female with history of CAD, mechanical MVR andTVR, status post atrial fibrillation ablation back in atrial fibrillation at times rapid ventricular response, COPD, hypertension, dyslipidemia, gout, chronic kidney disease presented with chest pain dizziness and shortness of breath evidence of tachybradycardia times, adjustments of medications with improvement in atrial fibrillation status. She also had epistaxis requiring packed red blood cell support.  Pt also with subtherapeutic INR requiring IV heparin for AC in setting of mechanical Valve  Echo with preserved EF, Grade II DD, mild MR/TR/AR, mod MS    PLAN:     Continue telemetry monitoring to assess dysrhythmias. Atrial fibrillation seems rate controlled  Cont on Lasix 40 mg p.o. b.i.d. to help keep negative fluid status. will switch to IV for few doses as pt still have increased sob at times   Cont Toprol 25 mg once daily and  remains off digoxin.   Continue IV heparin and warfarin with goal INR of 2.5-3.5 with mechanical MVR. Awaiting therapeutic INR values.  Cont on atorvastatin 40 mg daily for lipid lowering. No clear evidence of ACS are present.  Activity as tolerated.       70 year old Female with history of CAD, mechanical MVR andTVR, status post atrial fibrillation ablation back in atrial fibrillation at times rapid ventricular response, COPD, hypertension, dyslipidemia, gout, chronic kidney disease presented with chest pain dizziness and shortness of breath evidence of tachybradycardia times, adjustments of medications with improvement in atrial fibrillation status. She also had epistaxis requiring packed red blood cell support.  Pt also with subtherapeutic INR requiring IV heparin for AC in setting of mechanical Valve  Echo with preserved EF, Grade II DD, mild MR/TR/AR, mod MS    PLAN:     Continue telemetry monitoring to assess dysrhythmias. Atrial fibrillation seems rate controlled  Cont on Lasix 40 mg p.o. b.i.d. to help keep negative fluid status. will switch to IV for few doses as pt still have increased sob at times   cont supplemental O2/inhaler   Cont Toprol 25 mg once daily and  remains off digoxin.   Continue IV heparin and warfarin with goal INR of 2.5-3.5 with mechanical MVR. Awaiting therapeutic INR values.  Cont on atorvastatin 40 mg daily for now, lipid panel. No clear evidence of ACS present.  Activity as tolerated.       70 year old Female with history of CAD, mechanical MVR andTVR, status post atrial fibrillation ablation back in atrial fibrillation at times rapid ventricular response, COPD, hypertension, dyslipidemia, gout, chronic kidney disease presented with chest pain dizziness and shortness of breath evidence of tachybradycardia times, adjustments of medications with improvement in atrial fibrillation status. She also had epistaxis requiring packed red blood cell support.  Pt also with subtherapeutic INR requiring IV heparin for AC in setting of mechanical Valve  Echo with preserved EF, Grade II DD, mild MR/TR/AR, mod MS    PLAN:     Continue telemetry monitoring to assess dysrhythmias. Atrial fibrillation seems rate controlled  Cont on Lasix 40 mg p.o. b.i.d. to help keep negative fluid status. will switch to IV for few doses as pt still have increased sob at times   cont supplemental O2/inhaler   Cont Toprol 25 mg once daily and  remains off digoxin.   Restart  ACE-I when creat stabilized   Continue IV heparin and warfarin with goal INR of 2.5-3.5 with mechanical MVR. Awaiting therapeutic INR values.  Cont on atorvastatin 40 mg daily for now, lipid panel. No clear evidence of ACS present.  Activity as tolerated.

## 2021-01-04 NOTE — CONSULT NOTE ADULT - SUBJECTIVE AND OBJECTIVE BOX
HPI:   70 year old female with PMH of CAD with Mitral and tricuspid valve repair hx  on coumadin, INR goal 2.5-3.5, afib s/p ablation, COPD on 3L home O2, HTN, HLD, Gout, CKD 3,  otherwise presenting to ED due to chest pain on left with associated dizziness, mild headaches and SOB with exertion for past 1 week without fever/chills but symptoms worsening so pt came in for evaluation.     In the ED, pt had labwork which revealed INR 1.7, trop 0.015, ekg RBBB ?chronic, no st changes.   (29 Dec 2020 18:30)  ------- As Above ---------------------------  The patient has a history of anemia for a while. The patient denies melena, hematochezia, hematemesis, nausea, vomiting, abdominal pain, constipation, diarrhea, or change in bowel movements. The patient was told that she was anemic but could not get medical clearance for a colonoscopy or EGD  The patient state that she had a major bleed ( melena ) necessitating blood transfusions in July but no procedures were done.  Last colonoscopy was 10 years ago. No family history of colon cancer  See labs ( stable )      PAST MEDICAL & SURGICAL HISTORY:  COPD (chronic obstructive pulmonary disease)    Hypertension    CHF (congestive heart failure)    Tricuspid valve replaced    Mitral valve replaced    Gout    HTN (hypertension)    CHF (congestive heart failure)    COPD (chronic obstructive pulmonary disease)  on home O2    MIGUEL (obstructive sleep apnea)    Pulmonary hypertension    Atrial fibrillation  S/P Ablation    No significant past surgical history    Tricuspid valve replaced  mechanical    History of mitral valve replacement with mechanical valve        MEDICATIONS  (STANDING):  allopurinol 100 milliGRAM(s) Oral daily  atorvastatin 40 milliGRAM(s) Oral at bedtime  budesonide 160 MICROgram(s)/formoterol 4.5 MICROgram(s) Inhaler 2 Puff(s) Inhalation two times a day  furosemide   Injectable 40 milliGRAM(s) IV Push two times a day  heparin  Infusion.  Unit(s)/Hr (19 mL/Hr) IV Continuous <Continuous>  influenza   Vaccine 0.5 milliLiter(s) IntraMuscular once  metoprolol succinate ER 25 milliGRAM(s) Oral daily  warfarin 5 milliGRAM(s) Oral once    MEDICATIONS  (PRN):  heparin   Injectable 9000 Unit(s) IV Push every 6 hours PRN For aPTT less than 40  heparin   Injectable 4000 Unit(s) IV Push every 6 hours PRN For aPTT between 40 - 57      Allergies    No Known Allergies    Intolerances        FAMILY HISTORY:  Family history of hypertension (Mother)    Family history of stroke    Family history of hypertension (Father, Sibling)        REVIEW OF SYSTEMS:    CONSTITUTIONAL: No fever, weight loss,  EYES: No eye pain, visual disturbances, or discharge  ENMT:  No difficulty hearing, tinnitus, vertigo; No sinus or throat pain  NECK: No pain or stiffness  BREASTS: No pain, masses, or nipple discharge  RESPIRATORY: No cough, wheezing, chills or hemoptysis;   CARDIOVASCULAR: No chest pain, palpitations, dizziness, or leg swelling  GASTROINTESTINAL:  see above  GENITOURINARY: No dysuria, frequency, hematuria, or incontinence  NEUROLOGICAL: No headaches, memory loss, loss of strength, numbness, or tremors  SKIN: No itching, burning, rashes, or lesions   LYMPH NODES: No enlarged glands  ENDOCRINE: No heat or cold intolerance; No hair loss  MUSCULOSKELETAL: No joint pain or swelling; No muscle, back, or extremity pain  PSYCHIATRIC: No depression, anxiety, mood swings, or difficulty sleeping  HEME/LYMPH: No easy bruising, or bleeding gums  ALLERGY AND IMMUNOLOGIC: No hives or eczema          SOCIAL HISTORY:    FAMILY HISTORY:  Family history of hypertension (Mother)    Family history of stroke    Family history of hypertension (Father, Sibling)        Vital Signs Last 24 Hrs  T(C): 36.8 (04 Jan 2021 16:08), Max: 37 (04 Jan 2021 05:01)  T(F): 98.2 (04 Jan 2021 16:08), Max: 98.6 (04 Jan 2021 05:01)  HR: 72 (04 Jan 2021 16:08) (70 - 75)  BP: 158/65 (04 Jan 2021 16:08) (115/62 - 158/65)  BP(mean): --  RR: 18 (04 Jan 2021 16:08) (17 - 18)  SpO2: 95% (04 Jan 2021 16:08) (95% - 100%)    PHYSICAL EXAM:    GENERAL: NAD, well-groomed, well-developed  HEAD:  Atraumatic, Normocephalic  EYES: EOMI, PERRLA, conjunctiva and sclera clear  NECK: Supple, No JVD, Normal thyroid  NERVOUS SYSTEM:  Alert & Oriented X3, Good concentration;   CHEST/LUNG: Clear to percussion bilaterally; No rales, rhonchi, wheezing, or rubs  HEART: Regular rate and rhythm; No murmurs, rubs, or gallops  ABDOMEN: Soft, Nontender, Nondistended; Bowel sounds present  EXTREMITIES:  2+ Peripheral Pulses, No clubbing, cyanosis, or edema  LYMPH: No lymphadenopathy noted   RECTAL:  Deferred  SKIN: No rashes or lesions    LABS:                        7.9    8.60  )-----------( 361      ( 04 Jan 2021 06:41 )             28.3       CBC:  01-04 @ 06:41  WBC  8.60  HGB 7.9  HCT 28.3 Plate 361  MCV 92.5  01-03 @ 07:23  WBC  9.83  HGB 8.3  HCT 29.5 Plate 394  MCV 91.0  01-02 @ 06:57  WBC  8.64  HGB 7.1  HCT 25.8 Plate 362  MCV 90.8  01-01 @ 07:34  WBC  8.33  HGB 7.8  HCT 27.5 Plate 381  MCV 88.1  12-31 @ 06:21  WBC  7.76  HGB 8.0  HCT 28.2 Plate 396  MCV 87.6  12-30 @ 21:27  WBC  7.70  HGB 8.0  HCT 28.8 Plate 393  MCV 90.3  12-29 @ 22:05  WBC  9.37  HGB 8.2  HCT 29.2 Plate 379  MCV 90.7  12-29 @ 16:56  WBC  6.40  HGB 7.8  HCT 28.8 Plate 357  MCV 92.9           04 Jan 2021 06:41    137    |  98     |  56     ----------------------------<  129    4.3     |  34     |  1.66   03 Jan 2021 07:23    137    |  99     |  51     ----------------------------<  99     4.4     |  32     |  1.47   02 Jan 2021 06:57    135    |  96     |  47     ----------------------------<  89     3.9     |  37     |  1.46   01 Jan 2021 07:34    136    |  95     |  42     ----------------------------<  138    3.9     |  37     |  1.50   31 Dec 2020 06:21    137    |  98     |  42     ----------------------------<  118    4.0     |  33     |  1.49   30 Dec 2020 07:24    135    |  96     |  41     ----------------------------<  102    4.2     |  37     |  1.35   29 Dec 2020 16:56    135    |  95     |  40     ----------------------------<  110    4.4     |  37     |  1.38     Ca    9.0        04 Jan 2021 06:41  Ca    9.1        03 Jan 2021 07:23  Ca    9.2        02 Jan 2021 06:57  Ca    9.6        01 Jan 2021 07:34  Ca    9.8        31 Dec 2020 06:21  Ca    9.3        30 Dec 2020 07:24  Ca    9.3        29 Dec 2020 16:56  Phos  2.8       31 Dec 2020 06:21  Mg     2.2       31 Dec 2020 06:21    TPro  8.6    /  Alb  3.1    /  TBili  0.9    /  DBili  x      /  AST  18     /  ALT  16     /  AlkPhos  105    03 Jan 2021 07:23  TPro  9.0    /  Alb  3.3    /  TBili  0.5    /  DBili  x      /  AST  13     /  ALT  31     /  AlkPhos  122    30 Dec 2020 07:24  TPro  9.0    /  Alb  3.3    /  TBili  0.3    /  DBili  x      /  AST  27     /  ALT  26     /  AlkPhos  120    29 Dec 2020 16:56    PT/INR - ( 04 Jan 2021 06:41 )   PT: 22.5 sec;   INR: 2.00 ratio         PTT - ( 04 Jan 2021 06:41 )  PTT:97.6 sec        RADIOLOGY & ADDITIONAL STUDIES: HPI:   70 year old female with PMH of CAD with Mitral and tricuspid valve repair hx  on coumadin, INR goal 2.5-3.5, afib s/p ablation, COPD on 3L home O2, HTN, HLD, Gout, CKD 3,  otherwise presenting to ED due to chest pain on left with associated dizziness, mild headaches and SOB with exertion for past 1 week without fever/chills but symptoms worsening so pt came in for evaluation.     In the ED, pt had labwork which revealed INR 1.7, trop 0.015, ekg RBBB ?chronic, no st changes.   (29 Dec 2020 18:30)  ------- As Above ---------------------------  The patient has a history of anemia for a while. The patient denies melena, hematochezia, hematemesis, nausea, vomiting, abdominal pain, constipation, diarrhea, or change in bowel movements. The patient was told that she was anemic but could not get medical clearance for a colonoscopy or EGD  The patient state that she had a major bleed ( melena ) necessitating blood transfusions in July but no procedures were done.  Last colonoscopy was 10 years ago. Mother has history of colon cancer  See labs ( stable )      PAST MEDICAL & SURGICAL HISTORY:  COPD (chronic obstructive pulmonary disease)    Hypertension    CHF (congestive heart failure)    Tricuspid valve replaced    Mitral valve replaced    Gout    HTN (hypertension)    CHF (congestive heart failure)    COPD (chronic obstructive pulmonary disease)  on home O2    MIGUEL (obstructive sleep apnea)    Pulmonary hypertension    Atrial fibrillation  S/P Ablation    No significant past surgical history    Tricuspid valve replaced  mechanical    History of mitral valve replacement with mechanical valve        MEDICATIONS  (STANDING):  allopurinol 100 milliGRAM(s) Oral daily  atorvastatin 40 milliGRAM(s) Oral at bedtime  budesonide 160 MICROgram(s)/formoterol 4.5 MICROgram(s) Inhaler 2 Puff(s) Inhalation two times a day  furosemide   Injectable 40 milliGRAM(s) IV Push two times a day  heparin  Infusion.  Unit(s)/Hr (19 mL/Hr) IV Continuous <Continuous>  influenza   Vaccine 0.5 milliLiter(s) IntraMuscular once  metoprolol succinate ER 25 milliGRAM(s) Oral daily  warfarin 5 milliGRAM(s) Oral once    MEDICATIONS  (PRN):  heparin   Injectable 9000 Unit(s) IV Push every 6 hours PRN For aPTT less than 40  heparin   Injectable 4000 Unit(s) IV Push every 6 hours PRN For aPTT between 40 - 57      Allergies    No Known Allergies    Intolerances        FAMILY HISTORY:  Family history of hypertension (Mother)    Family history of stroke    Family history of hypertension (Father, Sibling)        REVIEW OF SYSTEMS:    CONSTITUTIONAL: No fever, weight loss,  EYES: No eye pain, visual disturbances, or discharge  ENMT:  No difficulty hearing, tinnitus, vertigo; No sinus or throat pain  NECK: No pain or stiffness  BREASTS: No pain, masses, or nipple discharge  RESPIRATORY: No cough, wheezing, chills or hemoptysis;   CARDIOVASCULAR: No chest pain, palpitations, dizziness, or leg swelling  GASTROINTESTINAL:  see above  GENITOURINARY: No dysuria, frequency, hematuria, or incontinence  NEUROLOGICAL: No headaches, memory loss, loss of strength, numbness, or tremors  SKIN: No itching, burning, rashes, or lesions   LYMPH NODES: No enlarged glands  ENDOCRINE: No heat or cold intolerance; No hair loss  MUSCULOSKELETAL: No joint pain or swelling; No muscle, back, or extremity pain  PSYCHIATRIC: No depression, anxiety, mood swings, or difficulty sleeping  HEME/LYMPH: No easy bruising, or bleeding gums  ALLERGY AND IMMUNOLOGIC: No hives or eczema          SOCIAL HISTORY:    FAMILY HISTORY:  Family history of hypertension (Mother)    Family history of stroke    Family history of hypertension (Father, Sibling)        Vital Signs Last 24 Hrs  T(C): 36.8 (04 Jan 2021 16:08), Max: 37 (04 Jan 2021 05:01)  T(F): 98.2 (04 Jan 2021 16:08), Max: 98.6 (04 Jan 2021 05:01)  HR: 72 (04 Jan 2021 16:08) (70 - 75)  BP: 158/65 (04 Jan 2021 16:08) (115/62 - 158/65)  BP(mean): --  RR: 18 (04 Jan 2021 16:08) (17 - 18)  SpO2: 95% (04 Jan 2021 16:08) (95% - 100%)    PHYSICAL EXAM:    GENERAL: NAD, well-groomed, well-developed  HEAD:  Atraumatic, Normocephalic  EYES: EOMI, PERRLA, conjunctiva and sclera clear  NECK: Supple, No JVD, Normal thyroid  NERVOUS SYSTEM:  Alert & Oriented X3, Good concentration;   CHEST/LUNG: Clear to percussion bilaterally; No rales, rhonchi, wheezing, or rubs  HEART: Regular rate and rhythm; No murmurs, rubs, or gallops  ABDOMEN: Soft, Nontender, Nondistended; Bowel sounds present  EXTREMITIES:  2+ Peripheral Pulses, No clubbing, cyanosis, or edema  LYMPH: No lymphadenopathy noted   RECTAL:  Deferred  SKIN: No rashes or lesions    LABS:                        7.9    8.60  )-----------( 361      ( 04 Jan 2021 06:41 )             28.3       CBC:  01-04 @ 06:41  WBC  8.60  HGB 7.9  HCT 28.3 Plate 361  MCV 92.5  01-03 @ 07:23  WBC  9.83  HGB 8.3  HCT 29.5 Plate 394  MCV 91.0  01-02 @ 06:57  WBC  8.64  HGB 7.1  HCT 25.8 Plate 362  MCV 90.8  01-01 @ 07:34  WBC  8.33  HGB 7.8  HCT 27.5 Plate 381  MCV 88.1  12-31 @ 06:21  WBC  7.76  HGB 8.0  HCT 28.2 Plate 396  MCV 87.6  12-30 @ 21:27  WBC  7.70  HGB 8.0  HCT 28.8 Plate 393  MCV 90.3  12-29 @ 22:05  WBC  9.37  HGB 8.2  HCT 29.2 Plate 379  MCV 90.7  12-29 @ 16:56  WBC  6.40  HGB 7.8  HCT 28.8 Plate 357  MCV 92.9           04 Jan 2021 06:41    137    |  98     |  56     ----------------------------<  129    4.3     |  34     |  1.66   03 Jan 2021 07:23    137    |  99     |  51     ----------------------------<  99     4.4     |  32     |  1.47   02 Jan 2021 06:57    135    |  96     |  47     ----------------------------<  89     3.9     |  37     |  1.46   01 Jan 2021 07:34    136    |  95     |  42     ----------------------------<  138    3.9     |  37     |  1.50   31 Dec 2020 06:21    137    |  98     |  42     ----------------------------<  118    4.0     |  33     |  1.49   30 Dec 2020 07:24    135    |  96     |  41     ----------------------------<  102    4.2     |  37     |  1.35   29 Dec 2020 16:56    135    |  95     |  40     ----------------------------<  110    4.4     |  37     |  1.38     Ca    9.0        04 Jan 2021 06:41  Ca    9.1        03 Jan 2021 07:23  Ca    9.2        02 Jan 2021 06:57  Ca    9.6        01 Jan 2021 07:34  Ca    9.8        31 Dec 2020 06:21  Ca    9.3        30 Dec 2020 07:24  Ca    9.3        29 Dec 2020 16:56  Phos  2.8       31 Dec 2020 06:21  Mg     2.2       31 Dec 2020 06:21    TPro  8.6    /  Alb  3.1    /  TBili  0.9    /  DBili  x      /  AST  18     /  ALT  16     /  AlkPhos  105    03 Jan 2021 07:23  TPro  9.0    /  Alb  3.3    /  TBili  0.5    /  DBili  x      /  AST  13     /  ALT  31     /  AlkPhos  122    30 Dec 2020 07:24  TPro  9.0    /  Alb  3.3    /  TBili  0.3    /  DBili  x      /  AST  27     /  ALT  26     /  AlkPhos  120    29 Dec 2020 16:56    PT/INR - ( 04 Jan 2021 06:41 )   PT: 22.5 sec;   INR: 2.00 ratio         PTT - ( 04 Jan 2021 06:41 )  PTT:97.6 sec        RADIOLOGY & ADDITIONAL STUDIES:

## 2021-01-04 NOTE — CONSULT NOTE ADULT - ASSESSMENT
Subjective Complaints:      Consult requested by ER doctor:                  Attending:     History of Present Illness:  Chief Complaint/Reason for Admission:  History of Present Illness:  HPI:   70 year old female with PMH of CAD with Mitral and tricuspid valve repair hx  on coumadin, INR goal 2.5-3.5, afib s/p ablation, COPD on 3L home O2, HTN, HLD, Gout, CKD 3,  otherwise presenting to ED due to chest pain on left with associated dizziness, mild headaches and SOB with exertion for past 1 week without fever/chills but symptoms worsening so pt came in for evaluation.     In the ED, pt had labwork which revealed INR 1.7, trop 0.015, ekg RBBB ?chronic, no st changes.   (29 Dec 2020 18:30)        PAST MEDICAL & SURGICAL HISTORY:  COPD (chronic obstructive pulmonary disease)    Hypertension    CHF (congestive heart failure)    Tricuspid valve replaced    Mitral valve replaced    Gout    HTN (hypertension)    CHF (congestive heart failure)    COPD (chronic obstructive pulmonary disease)  on home O2    MIGUEL (obstructive sleep apnea)    Pulmonary hypertension    Atrial fibrillation  S/P Ablation    No significant past surgical history    Tricuspid valve replaced  mechanical    History of mitral valve replacement with mechanical valve    [id number]    MEDICATIONS  (STANDING):  allopurinol 100 milliGRAM(s) Oral daily  atorvastatin 40 milliGRAM(s) Oral at bedtime  budesonide 160 MICROgram(s)/formoterol 4.5 MICROgram(s) Inhaler 2 Puff(s) Inhalation two times a day  furosemide    Tablet 40 milliGRAM(s) Oral two times a day  heparin  Infusion.  Unit(s)/Hr (19 mL/Hr) IV Continuous <Continuous>  influenza   Vaccine 0.5 milliLiter(s) IntraMuscular once  metoprolol succinate ER 25 milliGRAM(s) Oral daily  warfarin 5 milliGRAM(s) Oral once    MEDICATIONS  (PRN):  heparin   Injectable 9000 Unit(s) IV Push every 6 hours PRN For aPTT less than 40  heparin   Injectable 4000 Unit(s) IV Push every 6 hours PRN For aPTT between 40 - 57      Allergies    No Known Allergies    Intolerances      FAMILY HISTORY:  Family history of hypertension (Mother)    Family history of stroke    Family history of hypertension (Father, Sibling)        REVIEW OF SYSTEMS:  General:  No wt loss, fevers, chills, night sweats  Eyes:  Good vision, no reported pain  ENT:  No sore throat, pain, runny nose, dysphagia  CV:  No pain, palpitatioins, hypo/hypertension  Resp:  No dyspnea, cough, tachypnea, wheezing  GI:  No pain, nausea, vomiting, diarrhea, constipatiion  :  No pain, bleeding, incontinence, nocturia  Muscle:  No pain, weakness  Breast:  No pain, abscess, mass, discharge  Neuro:  No weakness, tingling, memory problems  Psych:  No fatigue, insomnia, mood problems, depression  Endocrine:  No polyuria, polydypsia, cold/heat intolerance  Heme:  No petechiae, ecchymosis, easy bruisability  Skin:  No rash, tattoos, scars, edema      Vital Signs Last 24 Hrs  T(C): 37.4 (01 Jan 2021 16:54), Max: 37.4 (01 Jan 2021 16:54)  T(F): 99.3 (01 Jan 2021 16:54), Max: 99.3 (01 Jan 2021 16:54)  HR: 69 (01 Jan 2021 16:54) (69 - 85)  BP: 142/83 (01 Jan 2021 16:54) (122/79 - 142/83)  BP(mean): --  RR: 17 (01 Jan 2021 16:54) (17 - 18)  SpO2: 94% (01 Jan 2021 16:54) (94% - 98%)    GENERAL PHYSICAL EXAM:  General:  Appears stated age, well-groomed, well-nourished, no distress  HEENT:  NC/AT, patent nares w/ pink mucosa, OP clear w/o lesions, PERRL, EOMI, conjunctivae clear, no thyromegaly, nodules, adenopathy, no JVD  Chest:  Full & symmetric excursion, no increased effort, breath sounds clear  Cardiovascular:  Regular rhythm, S1, S2, no murmur/rub/S3/S4, no carotid/femoral/abdominal bruit, radial/pedal pulses 2+, no edema  Abdomen:  Soft, non-tender, non-distended, normoactive bowel sounds, no HSM  Extremities:  Gait & station:   Digits:   Nails:   Joints, Bones, Muscles:   ROM:   Stability:  Skin:  No rash/erythema/ecchymoses/petechiae/wounds/abscess/warm/dry  Musculoskeletal:  Full ROM in all joints w/o swelling/tenderness/effusion    NEUROLOGICAL EXAM:  HENT:  Normocephalic head; atraumatic head.  Neck supple.  ENT: normal looking.  Mental State:    Alert.  Fully oriented to person, place and date.  Coherent.  Speech clear and intact.  Cooperative.  Responds appropriately.    Cranial Nerves:  II-XII:   Pupils round and reactive to light and accommodation.  Extraocular movements full.  Visual fields full (no homonymous hemianopsia).  Visual acuity wnl.  Facial symmetry intact.  Tongue midline.  Motor Functions:  Moves all extremities.  No pronator drift of UE.  Claps hand well.  Hand  intact bilaterally.  Ambulatory.    Sensory Functions:   Intact to touch and pinprick to face and extremities.    Reflexes:  Deep tendon reflexes normoactive to biceps, knees and ankles.  Babinski absent (present).  Cerebellar Testing:    Finger to nose intact.  Nystagmus absent.  Neurovascular: Carotid auscultation full without bruits.      LABS:                        7.8    8.33  )-----------( 381      ( 01 Jan 2021 07:34 )             27.5     01-01    136  |  95<L>  |  42<H>  ----------------------------<  138<H>  3.9   |  37<H>  |  1.50<H>    Ca    9.6      01 Jan 2021 07:34  Phos  2.8     12-31  Mg     2.2     12-31      PT/INR - ( 01 Jan 2021 16:33 )   PT: 20.7 sec;   INR: 1.84 ratio         PTT - ( 01 Jan 2021 01:01 )  PTT:89.5 sec        RADIOLOGY & ADDITIONAL STUDIES:      Assessment & Opinion: events noted s/p dizziness hx of  afib on coumadin ct head  no acute path follows commands aRM LEG 4/5 SOB HX OF MITRAL AND TRICUSPID VALVE SURG  SENSORY INTACT  NO SEIZURE WALK WITH WALKER MORBID OBESITY     Recommendations:  Brain MRI.  Carotid doppler.  Echocardiogram.  EEG.   DVT prophylaxis as ordered. TSH B12  WILL FOLLOW   Medications:    
HPI:   70 year old female with PMH of CAD with Mitral and tricuspid valve repair hx  on coumadin, INR goal 2.5-3.5, afib s/p ablation, COPD on 3L home O2, HTN, HLD, Gout, CKD 3,  otherwise presenting to ED due to chest pain on left with associated dizziness, mild headaches and SOB with exertion for past 1 week without fever/chills but symptoms worsening so pt came in for evaluation.     In the ED, pt had labwork which revealed INR 1.7, trop 0.015, ekg RBBB ?chronic, no st changes.   (29 Dec 2020 18:30)  ------- As Above ---------------------------  The patient has a history of anemia for a while. The patient denies melena, hematochezia, hematemesis, nausea, vomiting, abdominal pain, constipation, diarrhea, or change in bowel movements. The patient was told that she was anemic but could not get medical clearance for a colonoscopy or EGD  The patient state that she had a major bleed ( melena ) necessitating blood transfusions in July but no procedures were done.  Last colonoscopy was 10 years ago. No family history of colon cancer  See labs ( stable )      Anemia, chronic - 1) labs 2) hemoccult 3) BE when stable

## 2021-01-04 NOTE — PROGRESS NOTE ADULT - ASSESSMENT
Subjective Complaints:  Historian:             Vital Signs Last 24 Hrs  T(C): 36.8 (04 Jan 2021 16:08), Max: 37 (04 Jan 2021 05:01)  T(F): 98.2 (04 Jan 2021 16:08), Max: 98.6 (04 Jan 2021 05:01)  HR: 72 (04 Jan 2021 16:08) (70 - 75)  BP: 158/65 (04 Jan 2021 16:08) (115/62 - 158/65)  BP(mean): --  RR: 18 (04 Jan 2021 16:08) (17 - 18)  SpO2: 95% (04 Jan 2021 16:08) (95% - 100%)    GENERAL PHYSICAL EXAM:  General:  Appears stated age, well-groomed, well-nourished, no distress  HEENT:  NC/AT, patent nares w/ pink mucosa, OP clear w/o lesions, PERRL, EOMI, conjunctivae clear, no thyromegaly, nodules, adenopathy, no JVD  Chest:  Full & symmetric excursion, no increased effort, breath sounds clear  Cardiovascular:  Regular rhythm, S1, S2, no murmur/rub/S3/S4, no carotid/femoral/abdominal bruit, radial/pedal pulses 2+, no edema  Abdomen:  Soft, non-tender, non-distended, normoactive bowel sounds, no HSM  Extremities:  Gait & station:   Digits:   Nails:   Joints, Bones, Muscles:   ROM:   Stability:  Skin:  No rash/erythema/ecchymoses/petechiae/wounds/abscess/warm/dry  Musculoskeletal:  Full ROM in all joints w/o swelling/tenderness/effusion        LABS:                        7.9    8.60  )-----------( 361      ( 04 Jan 2021 06:41 )             28.3     01-04    137  |  98  |  56<H>  ----------------------------<  129<H>  4.3   |  34<H>  |  1.66<H>    Ca    9.0      04 Jan 2021 06:41    TPro  8.6<H>  /  Alb  3.1<L>  /  TBili  0.9  /  DBili  x   /  AST  18  /  ALT  16  /  AlkPhos  105  01-03    PT/INR - ( 04 Jan 2021 06:41 )   PT: 22.5 sec;   INR: 2.00 ratio         PTT - ( 04 Jan 2021 06:41 )  PTT:97.6 sec      RADIOLOGY & ADDITIONAL STUDIES:        Neurology Progress Note:      Mental Status: awaek alert speech fluent follows commands       Cranial Nerves: 2 12/intact      Motor:   arm leg 4/5         Sensory: intact      Cerebellar: defrd      Gait: unsteady       Assesment/Plan: copd afib  on coumadin no dizziness morbid obesity  c t head no acute path

## 2021-01-04 NOTE — PROGRESS NOTE ADULT - ASSESSMENT
70 years    with history of CAD,  mechanical MVR and tricuspid valve repair,   s/p  atrial fibrillation ablation,      now in   atrial fibrillation at times rapid ventricular response, COPD on 3  L , home  O2, ,HTN.  HLD,  gout, chronic kidney disease      presented with  CP,  dizziness and sob  with  evidence of tachybradycardia     She also had epistaxis requiring packed red blood cell support.         telemetry monitoring   1.,  Atrial fibrillation    on Lasix 40 mg  b.i.d. ,  metoprolol ER 25 mg  / lipitor  card  dr barreto   2.  MVR  mechanical  Continue IV heparin and warfarin with goal INR of 2.5-3.5 with mechanical MVR   daily INR   3.  HTN, on Toprol   4. Dizziness , per neuro dr brenner, awaiting mri  Ct head , with infarcts, Right b. ganglia and Right cerebellum   5. Anemia, hb is  7.9   c/c  anemia, , baseline is  8  to 9  Gi eval/  stool guaic/ iron studies    Echo  12/20,normal  ef/mod  MS/  severe Pulm Htn       < from: CT Head No Cont (12.29.20 @ 20:41)   IMPRESSION: No acute intracranial hemorrhage. Age-indeterminate right basal ganglia lacunar infarct and right cerebellar lacunar infarct. If there is clinical suspicion for acute infarct, consider brain MRI if there is no contraindication.  < end of copied text >      < from: TTE Echo Complete w/ Contrast w/ Doppler (12.31.20 @ 14:01) >  ummary:   1. Left ventricular ejection fraction, by visual estimation, is 60 to 65%.   2. Technically suboptimal study.   3. Normal global left ventricular systolic function.   4. Normal left ventricular internal cavity size.   5. Spectral Doppler shows pseudonormal pattern of left ventricular myocardial filling (Grade II diastolic dysfunction).   6. Normal right ventricular size and function.   7. Mild to moderately enlarged left atrium.   8. There is no evidence of pericardial effusion.   9. Mild mitral annular calcification.  10. Mild mitral valve regurgitation.  11. Mild thickening and calcification of the anterior and posterior mitral valve leaflets.  12. Peak transmitral mean gradient equals 6.8 mmHg, calculated mitral valve area by pressure half time equals 1.86 cm² consistent withmild mitral stenosis.  13. Mild tricuspid regurgitation.  14. Mild aortic regurgitation.  15. Sclerotic aortic valve with normal opening.  16. Estimated pulmonary artery systolic pressure is 68.4 mmHg assuming a right atrial pressure of 8 mmHg, whichis consistent with severe pulmonary hypertension.  17. C/w the study of 6-21-20, more significant pulmonary htn is now noted.  18. Endocardial visualization was enhanced with intravenous echo contrast.  19. Mitral valve mean gradient is 6.8 mmHg consistent with moderate mitral stenosis.  Montana Barreto MD FACC, FASE, FAC  < end of copied text >                   70 years    with history of CAD,  mechanical MVR and tricuspid valve repair,   s/p  atrial fibrillation ablation,      now in   atrial fibrillation at times rapid ventricular response, COPD on 3  L , home  O2, ,HTN.  HLD,  gout, chronic kidney disease      presented with  CP,  dizziness and sob  with  evidence of tachybradycardia     She also had epistaxis requiring packed red blood cell support.         telemetry monitoring   1.,  Atrial fibrillation    on Lasix 40 mg  b.i.d. ,  metoprolol ER 25 mg  / lipitor  card  dr barreto   2.  MVR  mechanical  Continue IV heparin and warfarin with goal INR of 2.5-3.5 with mechanical MVR   daily INR   3.  HTN, on Toprol   4. Dizziness , per neuro dr brenner, awaiting mri  Ct head , with infarcts, Right b. ganglia and Right cerebellum   5. Anemia, hb is  7.9   c/c  anemia, , baseline is  8  to 9  Gi eval/  stool guaic/ iron studies  6. acute  Diastolic  chf.  gained  1  kg/  extra lasix  given  on po lasix  at home  7. Severe Pulm Htn  Echo  12/20,normal  ef/mod  MS/  severe Pulm Htn       < from: CT Head No Cont (12.29.20 @ 20:41)   IMPRESSION: No acute intracranial hemorrhage. Age-indeterminate right basal ganglia lacunar infarct and right cerebellar lacunar infarct. If there is clinical suspicion for acute infarct, consider brain MRI if there is no contraindication.  < end of copied text >      < from: TTE Echo Complete w/ Contrast w/ Doppler (12.31.20 @ 14:01) >  ummary:   1. Left ventricular ejection fraction, by visual estimation, is 60 to 65%.   2. Technically suboptimal study.   3. Normal global left ventricular systolic function.   4. Normal left ventricular internal cavity size.   5. Spectral Doppler shows pseudonormal pattern of left ventricular myocardial filling (Grade II diastolic dysfunction).   6. Normal right ventricular size and function.   7. Mild to moderately enlarged left atrium.   8. There is no evidence of pericardial effusion.   9. Mild mitral annular calcification.  10. Mild mitral valve regurgitation.  11. Mild thickening and calcification of the anterior and posterior mitral valve leaflets.  12. Peak transmitral mean gradient equals 6.8 mmHg, calculated mitral valve area by pressure half time equals 1.86 cm² consistent withmild mitral stenosis.  13. Mild tricuspid regurgitation.  14. Mild aortic regurgitation.  15. Sclerotic aortic valve with normal opening.  16. Estimated pulmonary artery systolic pressure is 68.4 mmHg assuming a right atrial pressure of 8 mmHg, whichis consistent with severe pulmonary hypertension.  17. C/w the study of 6-21-20, more significant pulmonary htn is now noted.  18. Endocardial visualization was enhanced with intravenous echo contrast.  19. Mitral valve mean gradient is 6.8 mmHg consistent with moderate mitral stenosis.  Montana Barreto MD FACC, FASE, FAC  < end of copied text >                   70 years    with history of CAD,  mechanical MVR and tricuspid valve repair,   s/p  atrial fibrillation ablation,      now in   atrial fibrillation at times rapid ventricular response, COPD on 3  L , home  O2, ,HTN.  HLD,  gout, chronic kidney disease      presented with  CP,  dizziness and sob  with  evidence of tachybradycardia     She also had epistaxis requiring packed red blood cell support.         telemetry monitoring   1.,  Atrial fibrillation    on Lasix 40 mg  b.i.d. ,  metoprolol ER 25 mg  / lipitor  card  dr barreto   2.  MVR  mechanical  Continue IV heparin and warfarin with goal INR of 2.5-3.5 with mechanical MVR   daily INR   3.  HTN, on Toprol   4. Dizziness , per neuro dr brenner  Ct head , with infarcts, Right b. ganglia and Right cerebellum   5. Anemia, hb is  7.9   c/c  anemia, , baseline is  8  to 9  Gi eval/  stool guaic/ iron studies  6. acute  Diastolic  chf.  gained  1  kg/  extra lasix  given  on po lasix  at home  7. Severe Pulm Htn  Echo  12/20,normal  ef/mod  MS/  severe Pulm Htn       < from: CT Head No Cont (12.29.20 @ 20:41)   IMPRESSION: No acute intracranial hemorrhage. Age-indeterminate right basal ganglia lacunar infarct and right cerebellar lacunar infarct. If there is clinical suspicion for acute infarct, consider brain MRI if there is no contraindication.  < end of copied text >      < from: TTE Echo Complete w/ Contrast w/ Doppler (12.31.20 @ 14:01) >  ummary:   1. Left ventricular ejection fraction, by visual estimation, is 60 to 65%.   2. Technically suboptimal study.   3. Normal global left ventricular systolic function.   4. Normal left ventricular internal cavity size.   5. Spectral Doppler shows pseudonormal pattern of left ventricular myocardial filling (Grade II diastolic dysfunction).   6. Normal right ventricular size and function.   7. Mild to moderately enlarged left atrium.   8. There is no evidence of pericardial effusion.   9. Mild mitral annular calcification.  10. Mild mitral valve regurgitation.  11. Mild thickening and calcification of the anterior and posterior mitral valve leaflets.  12. Peak transmitral mean gradient equals 6.8 mmHg, calculated mitral valve area by pressure half time equals 1.86 cm² consistent withmild mitral stenosis.  13. Mild tricuspid regurgitation.  14. Mild aortic regurgitation.  15. Sclerotic aortic valve with normal opening.  16. Estimated pulmonary artery systolic pressure is 68.4 mmHg assuming a right atrial pressure of 8 mmHg, whichis consistent with severe pulmonary hypertension.  17. C/w the study of 6-21-20, more significant pulmonary htn is now noted.  18. Endocardial visualization was enhanced with intravenous echo contrast.  19. Mitral valve mean gradient is 6.8 mmHg consistent with moderate mitral stenosis.  Montana Barreto MD FACC, ANGELA, FAC  < end of copied text >                   70 years      h/o   CAD,  mechanical MVR and Tricuspid valve repair,      s/p  AFIB ablation,  which was  unsuccessful.      now in   atrial fibrillation.   COPD on 3 L  home  O2, ,HTN.  HLD,  gout, CKD  Morbid  obesity,  BMI is 41     presented with  CP,  dizziness and sob  with  evidence of tachybradycardia   sx   She also had epistaxis requiring packed red blood cell support.     telemetry monitoring   1.,  Atrial fibrillation    on Lasix 40 mg  bid,   metoprolol ER 25 mg  / lipitor  card  dr barreto   2.  MVR  mechanical  Continue IV heparin and warfarin with goal INR of 2.5-3.5 with mechanical MVR   daily INR   3.  HTN, on Toprol   4. Dizziness , per neuro dr brenner  Ct head , with infarcts, Right b. ganglia and Right cerebellum   5. Anemia, hb is  7.9   c/c  anemia, , baseline is  8  to 9  Gi eval/ dr elizondo,   stool guaic/ iron studies  6.  Acute  Diastolic  Chf.  gained  1  kg/  extra lasix  given  on po lasix  at home  7. Severe Pulm Htn    Echo  12/20,normal  ef/mod  MS/  severe Pulm Htn       < from: CT Head No Cont (12.29.20 @ 20:41)   IMPRESSION: No acute intracranial hemorrhage. Age-indeterminate right basal ganglia lacunar infarct and right cerebellar lacunar infarct. If there is clinical suspicion for acute infarct, consider brain MRI if there is no contraindication.  < end of copied text >      < from: TTE Echo Complete w/ Contrast w/ Doppler (12.31.20 @ 14:01) >  ummary:   1. Left ventricular ejection fraction, by visual estimation, is 60 to 65%.   2. Technically suboptimal study.   3. Normal global left ventricular systolic function.   4. Normal left ventricular internal cavity size.   5. Spectral Doppler shows pseudonormal pattern of left ventricular myocardial filling (Grade II diastolic dysfunction).   6. Normal right ventricular size and function.   7. Mild to moderately enlarged left atrium.   8. There is no evidence of pericardial effusion.   9. Mild mitral annular calcification.  10. Mild mitral valve regurgitation.  11. Mild thickening and calcification of the anterior and posterior mitral valve leaflets.  12. Peak transmitral mean gradient equals 6.8 mmHg, calculated mitral valve area by pressure half time equals 1.86 cm² consistent withmild mitral stenosis.  13. Mild tricuspid regurgitation.  14. Mild aortic regurgitation.  15. Sclerotic aortic valve with normal opening.  16. Estimated pulmonary artery systolic pressure is 68.4 mmHg assuming a right atrial pressure of 8 mmHg, whichis consistent with severe pulmonary hypertension.  17. C/w the study of 6-21-20, more significant pulmonary htn is now noted.  18. Endocardial visualization was enhanced with intravenous echo contrast.  19. Mitral valve mean gradient is 6.8 mmHg consistent with moderate mitral stenosis.  Montana Barreto MD FACC, ANGELA, FAC  < end of copied text >

## 2021-01-04 NOTE — PROGRESS NOTE ADULT - SUBJECTIVE AND OBJECTIVE BOX
Patient is a 70y old  Female who presents with a chief complaint of chest pain (04 Jan 2021 10:17)      PAST MEDICAL & SURGICAL HISTORY:    Hypertension    CHF (congestive heart failure)    Tricuspid valve replaced    Mitral valve replaced    Gout    COPD (chronic obstructive pulmonary disease)  on home O2    MIGUEL (obstructive sleep apnea)    Pulmonary hypertension    Atrial fibrillation  S/P Ablation    No significant past surgical history    Tricuspid valve replaced  mechanical    History of mitral valve replacement with mechanical valve    INTERVAL HISTORY:  	  MEDICATIONS:  MEDICATIONS  (STANDING):  allopurinol 100 milliGRAM(s) Oral daily  atorvastatin 40 milliGRAM(s) Oral at bedtime  budesonide 160 MICROgram(s)/formoterol 4.5 MICROgram(s) Inhaler 2 Puff(s) Inhalation two times a day  furosemide    Tablet 40 milliGRAM(s) Oral two times a day  furosemide   Injectable 20 milliGRAM(s) IV Push once  heparin  Infusion.  Unit(s)/Hr (19 mL/Hr) IV Continuous <Continuous>  influenza   Vaccine 0.5 milliLiter(s) IntraMuscular once  metoprolol succinate ER 25 milliGRAM(s) Oral daily  warfarin 5 milliGRAM(s) Oral once    MEDICATIONS  (PRN):  heparin   Injectable 9000 Unit(s) IV Push every 6 hours PRN For aPTT less than 40  heparin   Injectable 4000 Unit(s) IV Push every 6 hours PRN For aPTT between 40 - 57    Vitals:  T(F): 98.6 (01-04-21 @ 05:01), Max: 98.6 (01-04-21 @ 05:01)  HR: 75 (01-04-21 @ 05:01) (67 - 75)  BP: 115/62 (01-04-21 @ 05:01) (115/62 - 133/76)  RR: 17 (01-04-21 @ 05:01) (17 - 18)  SpO2: 100% (01-04-21 @ 05:01) (98% - 100%)    01-03 @ 07:01  -  01-04 @ 07:00  --------------------------------------------------------  IN:    Oral Fluid: 1010 mL  Total IN: 1010 mL    OUT:    Voided (mL): 350 mL  Total OUT: 350 mL    Total NET: 660 mL    PHYSICAL EXAM:  Neuro: Awake, responsive  CV: S1 S2 irregular   Lungs: CTABL  GI: Soft, BS +, ND, NT  Extremities: No edema    TELEMETRY: atrial fibrillation     RADIOLOGY: < from: Xray Chest 1 View- PORTABLE-Urgent (Xray Chest 1 View- PORTABLE-Urgent .) (12.29.20 @ 16:57) >  Heart enlargement, heart surgery, and mild central CHF.    < end of copied text >    DIAGNOSTIC TESTING:    [x ] Echocardiogram: < from: TTE Echo Complete w/ Contrast w/ Doppler (12.31.20 @ 14:01) >  PHYSICIAN INTERPRETATION:  Left Ventricle: Endocardial visualization was enhanced with intravenous echo contrast. The left ventricular internal cavity size is normal.  Global LV systolic function was normal. Left ventricular ejection fraction, by visual estimation, is 60 to 65%. Spectral Doppler shows pseudonormal pattern of left ventricular myocardial filling (Grade II diastolic dysfunction).  Right Ventricle: Normal right ventricular size and function.  Left Atrium: Mild to moderately enlarged left atrium.  Right Atrium: The right atrium was not well visualized.  Pericardium: There is no evidence ofpericardial effusion.  Mitral Valve: The mitral valve is not well seen. Mild thickening and calcification of the anterior and posterior mitral valve leaflets. Mitral leaflet mobility is normal. There is mild mitral annular calcification. Peak transmitral mean gradient equals 6.8 mmHg, calculated mitral valve area by pressure half time equals 1.86 cm² consistent with mild mitral stenosis. Mitral valve mean gradient is 6.8 mmHg consistent with moderate mitral stenosis. Mild mitral valve regurgitation is seen.  Tricuspid Valve: The tricuspid valve is not well seen. The tricuspid valve is normal in structure. Mild tricuspid regurgitation is visualized. Estimated pulmonary artery systolic pressure is 68.4 mmHg assuming a right atrial pressure of 8mmHg, which is consistent with severe pulmonary hypertension.  Aortic Valve: The aortic valve is trileaflet. Sclerotic aortic valve with normal opening. Mild aortic valve regurgitation is seen.  Pulmonic Valve: The pulmonic valve was not well visualized. Trace pulmonic valve regurgitation.  Aorta: Aortic root measured at Sinus of Valsalva is normal.  Venous: The inferior vena cava was normal sized, with respiratory size variation less than 50%.      Summary:   1. Left ventricular ejection fraction, by visual estimation, is 60 to 65%.   2. Technically suboptimal study.   3. Normal global left ventricular systolic function.   4. Normal left ventricular internal cavity size.   5. Spectral Doppler shows pseudonormal pattern of left ventricular myocardial filling (Grade II diastolic dysfunction).   6. Normal right ventricular size and function.   7. Mild to moderately enlarged left atrium.   8. There is no evidence of pericardial effusion.   9. Mild mitral annular calcification.  10. Mild mitral valve regurgitation.  11. Mild thickening and calcification of the anterior and posterior mitral valve leaflets.  12. Peak transmitral mean gradient equals 6.8 mmHg, calculated mitral valve area by pressure half time equals 1.86 cm² consistent withmild mitral stenosis.  13. Mild tricuspid regurgitation.  14. Mild aortic regurgitation.  15. Sclerotic aortic valve with normal opening.  16. Estimated pulmonary artery systolic pressure is 68.4 mmHg assuming a right atrial pressure of 8 mmHg, whichis consistent with severe pulmonary hypertension.  17. C/w the study of 6-21-20, more significant pulmonary htn is now noted.  18. Endocardial visualization was enhanced with intravenous echo contrast.  19. Mitral valve mean gradient is 6.8 mmHg consistent with moderate mitral stenosis.    < end of copied text >    LABS:	 	    04 Jan 2021 06:41    137    |  98     |  56     ----------------------------<  129    4.3     |  34     |  1.66   03 Jan 2021 07:23    137    |  99     |  51     ----------------------------<  99     4.4     |  32     |  1.47   02 Jan 2021 06:57    135    |  96     |  47     ----------------------------<  89     3.9     |  37     |  1.46     Ca    9.0        04 Jan 2021 06:41    TPro  8.6    /  Alb  3.1    /  TBili  0.9    /  DBili  x      /  AST  18     /  ALT  16     /  AlkPhos  105    03 Jan 2021 07:23                          7.9    8.60  )-----------( 361      ( 04 Jan 2021 06:41 )             28.3 ,                       8.3    9.83  )-----------( 394      ( 03 Jan 2021 07:23 )             29.5 ,                       7.1    8.64  )-----------( 362      ( 02 Jan 2021 06:57 )             25.8   TSH: Thyroid Stimulating Hormone, Serum: 1.630 uIU/mL (01-02 @ 06:57)    PT/PTT- ( 04 Jan 2021 06:41 )   PT: 22.5 sec;  PTT: 97.6 sec     PT/PTT- ( 03 Jan 2021 23:51 )   PT: x    ;  PTT: 95.4 sec     PT/PTT- ( 03 Jan 2021 15:53 )   PT: x    ;  PTT: 160.7 sec     INR: 2.00 ratio (01-04 @ 06:41)  INR: 1.77 ratio (01-03 @ 07:23)  INR: 1.85 ratio (01-02 @ 06:57)  INR: 1.84 ratio (01-01 @ 16:33)           Patient is a 70y old  Female who presents with a chief complaint of chest pain (04 Jan 2021 10:17)      PAST MEDICAL & SURGICAL HISTORY:    Hypertension    CHF (congestive heart failure)    Tricuspid valve replaced    Mitral valve replaced    Gout    COPD (chronic obstructive pulmonary disease)  on home O2    MIGUEL (obstructive sleep apnea)    Pulmonary hypertension    Atrial fibrillation  S/P Ablation    No significant past surgical history    Tricuspid valve replaced  mechanical    History of mitral valve replacement with mechanical valve    INTERVAL HISTORY: Resting in bed, in no distress   	  MEDICATIONS:  MEDICATIONS  (STANDING):  allopurinol 100 milliGRAM(s) Oral daily  atorvastatin 40 milliGRAM(s) Oral at bedtime  budesonide 160 MICROgram(s)/formoterol 4.5 MICROgram(s) Inhaler 2 Puff(s) Inhalation two times a day  furosemide    Tablet 40 milliGRAM(s) Oral two times a day  furosemide   Injectable 20 milliGRAM(s) IV Push once  heparin  Infusion.  Unit(s)/Hr (19 mL/Hr) IV Continuous <Continuous>  influenza   Vaccine 0.5 milliLiter(s) IntraMuscular once  metoprolol succinate ER 25 milliGRAM(s) Oral daily  warfarin 5 milliGRAM(s) Oral once    MEDICATIONS  (PRN):  heparin   Injectable 9000 Unit(s) IV Push every 6 hours PRN For aPTT less than 40  heparin   Injectable 4000 Unit(s) IV Push every 6 hours PRN For aPTT between 40 - 57    Vitals:  T(F): 98.6 (01-04-21 @ 05:01), Max: 98.6 (01-04-21 @ 05:01)  HR: 75 (01-04-21 @ 05:01) (67 - 75)  BP: 115/62 (01-04-21 @ 05:01) (115/62 - 133/76)  RR: 17 (01-04-21 @ 05:01) (17 - 18)  SpO2: 100% (01-04-21 @ 05:01) (98% - 100%)    01-03 @ 07:01  -  01-04 @ 07:00  --------------------------------------------------------  IN:    Oral Fluid: 1010 mL  Total IN: 1010 mL    OUT:    Voided (mL): 350 mL  Total OUT: 350 mL    Total NET: 660 mL    PHYSICAL EXAM:  Neuro: Awake, responsive  CV: S1 S2 irregular + Mechanical click   Lungs: diminished to bases   GI: Soft, BS +, ND, NT  Extremities: + LE edema    TELEMETRY: atrial fibrillation     RADIOLOGY: < from: Xray Chest 1 View- PORTABLE-Urgent (Xray Chest 1 View- PORTABLE-Urgent .) (12.29.20 @ 16:57) >  Heart enlargement, heart surgery, and mild central CHF.    < end of copied text >    DIAGNOSTIC TESTING:    [x ] Echocardiogram: < from: TTE Echo Complete w/ Contrast w/ Doppler (12.31.20 @ 14:01) >  PHYSICIAN INTERPRETATION:  Left Ventricle: Endocardial visualization was enhanced with intravenous echo contrast. The left ventricular internal cavity size is normal.  Global LV systolic function was normal. Left ventricular ejection fraction, by visual estimation, is 60 to 65%. Spectral Doppler shows pseudonormal pattern of left ventricular myocardial filling (Grade II diastolic dysfunction).  Right Ventricle: Normal right ventricular size and function.  Left Atrium: Mild to moderately enlarged left atrium.  Right Atrium: The right atrium was not well visualized.  Pericardium: There is no evidence ofpericardial effusion.  Mitral Valve: The mitral valve is not well seen. Mild thickening and calcification of the anterior and posterior mitral valve leaflets. Mitral leaflet mobility is normal. There is mild mitral annular calcification. Peak transmitral mean gradient equals 6.8 mmHg, calculated mitral valve area by pressure half time equals 1.86 cm² consistent with mild mitral stenosis. Mitral valve mean gradient is 6.8 mmHg consistent with moderate mitral stenosis. Mild mitral valve regurgitation is seen.  Tricuspid Valve: The tricuspid valve is not well seen. The tricuspid valve is normal in structure. Mild tricuspid regurgitation is visualized. Estimated pulmonary artery systolic pressure is 68.4 mmHg assuming a right atrial pressure of 8mmHg, which is consistent with severe pulmonary hypertension.  Aortic Valve: The aortic valve is trileaflet. Sclerotic aortic valve with normal opening. Mild aortic valve regurgitation is seen.  Pulmonic Valve: The pulmonic valve was not well visualized. Trace pulmonic valve regurgitation.  Aorta: Aortic root measured at Sinus of Valsalva is normal.  Venous: The inferior vena cava was normal sized, with respiratory size variation less than 50%.      Summary:   1. Left ventricular ejection fraction, by visual estimation, is 60 to 65%.   2. Technically suboptimal study.   3. Normal global left ventricular systolic function.   4. Normal left ventricular internal cavity size.   5. Spectral Doppler shows pseudonormal pattern of left ventricular myocardial filling (Grade II diastolic dysfunction).   6. Normal right ventricular size and function.   7. Mild to moderately enlarged left atrium.   8. There is no evidence of pericardial effusion.   9. Mild mitral annular calcification.  10. Mild mitral valve regurgitation.  11. Mild thickening and calcification of the anterior and posterior mitral valve leaflets.  12. Peak transmitral mean gradient equals 6.8 mmHg, calculated mitral valve area by pressure half time equals 1.86 cm² consistent with mild mitral stenosis.  13. Mild tricuspid regurgitation.  14. Mild aortic regurgitation.  15. Sclerotic aortic valve with normal opening.  16. Estimated pulmonary artery systolic pressure is 68.4 mmHg assuming a right atrial pressure of 8 mmHg, which is consistent with severe pulmonary hypertension.  17. C/w the study of 6-21-20, more significant pulmonary htn is now noted.  18. Endocardial visualization was enhanced with intravenous echo contrast.  19. Mitral valve mean gradient is 6.8 mmHg consistent with moderate mitral stenosis.    < end of copied text >    LABS:	 	    04 Jan 2021 06:41    137    |  98     |  56     ----------------------------<  129    4.3     |  34     |  1.66   03 Jan 2021 07:23    137    |  99     |  51     ----------------------------<  99     4.4     |  32     |  1.47   02 Jan 2021 06:57    135    |  96     |  47     ----------------------------<  89     3.9     |  37     |  1.46     Ca    9.0        04 Jan 2021 06:41    TPro  8.6    /  Alb  3.1    /  TBili  0.9    /  DBili  x      /  AST  18     /  ALT  16     /  AlkPhos  105    03 Jan 2021 07:23                          7.9    8.60  )-----------( 361      ( 04 Jan 2021 06:41 )             28.3 ,                       8.3    9.83  )-----------( 394      ( 03 Jan 2021 07:23 )             29.5 ,                       7.1    8.64  )-----------( 362      ( 02 Jan 2021 06:57 )             25.8   TSH: Thyroid Stimulating Hormone, Serum: 1.630 uIU/mL (01-02 @ 06:57)    PT/PTT- ( 04 Jan 2021 06:41 )   PT: 22.5 sec;  PTT: 97.6 sec     PT/PTT- ( 03 Jan 2021 23:51 )   PT: x    ;  PTT: 95.4 sec     PT/PTT- ( 03 Jan 2021 15:53 )   PT: x    ;  PTT: 160.7 sec     INR: 2.00 ratio (01-04 @ 06:41)  INR: 1.77 ratio (01-03 @ 07:23)  INR: 1.85 ratio (01-02 @ 06:57)  INR: 1.84 ratio (01-01 @ 16:33)           Patient is a 70y old  Female who presents with a chief complaint of chest pain (04 Jan 2021 10:17)      PAST MEDICAL & SURGICAL HISTORY:    Hypertension    CHF (congestive heart failure)    Tricuspid valve replaced    Mitral valve replaced    Gout    COPD (chronic obstructive pulmonary disease)  on home O2    MIGUEL (obstructive sleep apnea)    Pulmonary hypertension    Atrial fibrillation  S/P Ablation    No significant past surgical history    Tricuspid valve replaced  mechanical    History of mitral valve replacement with mechanical valve    INTERVAL HISTORY: Resting in bed, in no acute distress, still c/o increased sob at times   	  MEDICATIONS:  MEDICATIONS  (STANDING):  allopurinol 100 milliGRAM(s) Oral daily  atorvastatin 40 milliGRAM(s) Oral at bedtime  budesonide 160 MICROgram(s)/formoterol 4.5 MICROgram(s) Inhaler 2 Puff(s) Inhalation two times a day  furosemide    Tablet 40 milliGRAM(s) Oral two times a day  furosemide   Injectable 20 milliGRAM(s) IV Push once  heparin  Infusion.  Unit(s)/Hr (19 mL/Hr) IV Continuous <Continuous>  influenza   Vaccine 0.5 milliLiter(s) IntraMuscular once  metoprolol succinate ER 25 milliGRAM(s) Oral daily  warfarin 5 milliGRAM(s) Oral once    MEDICATIONS  (PRN):  heparin   Injectable 9000 Unit(s) IV Push every 6 hours PRN For aPTT less than 40  heparin   Injectable 4000 Unit(s) IV Push every 6 hours PRN For aPTT between 40 - 57    Vitals:  T(F): 98.6 (01-04-21 @ 05:01), Max: 98.6 (01-04-21 @ 05:01)  HR: 75 (01-04-21 @ 05:01) (67 - 75)  BP: 115/62 (01-04-21 @ 05:01) (115/62 - 133/76)  RR: 17 (01-04-21 @ 05:01) (17 - 18)  SpO2: 100% (01-04-21 @ 05:01) (98% - 100%)    01-03 @ 07:01  -  01-04 @ 07:00  --------------------------------------------------------  IN:    Oral Fluid: 1010 mL  Total IN: 1010 mL    OUT:    Voided (mL): 350 mL  Total OUT: 350 mL    Total NET: 660 mL    PHYSICAL EXAM:  Neuro: Awake, responsive  CV: S1 S2 irregular + Mechanical click   Lungs: diminished to bases with few rales    GI: Soft, BS +, ND, NT  Extremities: + LE edema    TELEMETRY: atrial fibrillation     RADIOLOGY: < from: Xray Chest 1 View- PORTABLE-Urgent (Xray Chest 1 View- PORTABLE-Urgent .) (12.29.20 @ 16:57) >  Heart enlargement, heart surgery, and mild central CHF.    < end of copied text >    DIAGNOSTIC TESTING:    [x ] Echocardiogram: < from: TTE Echo Complete w/ Contrast w/ Doppler (12.31.20 @ 14:01) >  PHYSICIAN INTERPRETATION:  Left Ventricle: Endocardial visualization was enhanced with intravenous echo contrast. The left ventricular internal cavity size is normal.  Global LV systolic function was normal. Left ventricular ejection fraction, by visual estimation, is 60 to 65%. Spectral Doppler shows pseudonormal pattern of left ventricular myocardial filling (Grade II diastolic dysfunction).  Right Ventricle: Normal right ventricular size and function.  Left Atrium: Mild to moderately enlarged left atrium.  Right Atrium: The right atrium was not well visualized.  Pericardium: There is no evidence ofpericardial effusion.  Mitral Valve: The mitral valve is not well seen. Mild thickening and calcification of the anterior and posterior mitral valve leaflets. Mitral leaflet mobility is normal. There is mild mitral annular calcification. Peak transmitral mean gradient equals 6.8 mmHg, calculated mitral valve area by pressure half time equals 1.86 cm² consistent with mild mitral stenosis. Mitral valve mean gradient is 6.8 mmHg consistent with moderate mitral stenosis. Mild mitral valve regurgitation is seen.  Tricuspid Valve: The tricuspid valve is not well seen. The tricuspid valve is normal in structure. Mild tricuspid regurgitation is visualized. Estimated pulmonary artery systolic pressure is 68.4 mmHg assuming a right atrial pressure of 8mmHg, which is consistent with severe pulmonary hypertension.  Aortic Valve: The aortic valve is trileaflet. Sclerotic aortic valve with normal opening. Mild aortic valve regurgitation is seen.  Pulmonic Valve: The pulmonic valve was not well visualized. Trace pulmonic valve regurgitation.  Aorta: Aortic root measured at Sinus of Valsalva is normal.  Venous: The inferior vena cava was normal sized, with respiratory size variation less than 50%.      Summary:   1. Left ventricular ejection fraction, by visual estimation, is 60 to 65%.   2. Technically suboptimal study.   3. Normal global left ventricular systolic function.   4. Normal left ventricular internal cavity size.   5. Spectral Doppler shows pseudonormal pattern of left ventricular myocardial filling (Grade II diastolic dysfunction).   6. Normal right ventricular size and function.   7. Mild to moderately enlarged left atrium.   8. There is no evidence of pericardial effusion.   9. Mild mitral annular calcification.  10. Mild mitral valve regurgitation.  11. Mild thickening and calcification of the anterior and posterior mitral valve leaflets.  12. Peak transmitral mean gradient equals 6.8 mmHg, calculated mitral valve area by pressure half time equals 1.86 cm² consistent with mild mitral stenosis.  13. Mild tricuspid regurgitation.  14. Mild aortic regurgitation.  15. Sclerotic aortic valve with normal opening.  16. Estimated pulmonary artery systolic pressure is 68.4 mmHg assuming a right atrial pressure of 8 mmHg, which is consistent with severe pulmonary hypertension.  17. C/w the study of 6-21-20, more significant pulmonary htn is now noted.  18. Endocardial visualization was enhanced with intravenous echo contrast.  19. Mitral valve mean gradient is 6.8 mmHg consistent with moderate mitral stenosis.    < end of copied text >    LABS:	 	    04 Jan 2021 06:41    137    |  98     |  56     ----------------------------<  129    4.3     |  34     |  1.66   03 Jan 2021 07:23    137    |  99     |  51     ----------------------------<  99     4.4     |  32     |  1.47   02 Jan 2021 06:57    135    |  96     |  47     ----------------------------<  89     3.9     |  37     |  1.46     Ca    9.0        04 Jan 2021 06:41    TPro  8.6    /  Alb  3.1    /  TBili  0.9    /  DBili  x      /  AST  18     /  ALT  16     /  AlkPhos  105    03 Jan 2021 07:23                          7.9    8.60  )-----------( 361      ( 04 Jan 2021 06:41 )             28.3 ,                       8.3    9.83  )-----------( 394      ( 03 Jan 2021 07:23 )             29.5 ,                       7.1    8.64  )-----------( 362      ( 02 Jan 2021 06:57 )             25.8   TSH: Thyroid Stimulating Hormone, Serum: 1.630 uIU/mL (01-02 @ 06:57)    PT/PTT- ( 04 Jan 2021 06:41 )   PT: 22.5 sec;  PTT: 97.6 sec     PT/PTT- ( 03 Jan 2021 23:51 )   PT: x    ;  PTT: 95.4 sec     PT/PTT- ( 03 Jan 2021 15:53 )   PT: x    ;  PTT: 160.7 sec     INR: 2.00 ratio (01-04 @ 06:41)  INR: 1.77 ratio (01-03 @ 07:23)  INR: 1.85 ratio (01-02 @ 06:57)  INR: 1.84 ratio (01-01 @ 16:33)

## 2021-01-04 NOTE — PROGRESS NOTE ADULT - SUBJECTIVE AND OBJECTIVE BOX
afebrile    REVIEW OF SYSTEMS:  GEN: no fever,    no chills  RESP: no SOB,   no cough  CVS: no chest pain,   no palpitations  GI: no abdominal pain,   no nausea,   no vomiting,   no constipation,   no diarrhea  : no dysuria,   no frequency  NEURO: no headache,   no dizziness  PSYCH: no depression,   not anxious  Derm : no rash    MEDICATIONS  (STANDING):  allopurinol 100 milliGRAM(s) Oral daily  atorvastatin 40 milliGRAM(s) Oral at bedtime  budesonide 160 MICROgram(s)/formoterol 4.5 MICROgram(s) Inhaler 2 Puff(s) Inhalation two times a day  furosemide    Tablet 40 milliGRAM(s) Oral two times a day  heparin  Infusion.  Unit(s)/Hr (19 mL/Hr) IV Continuous <Continuous>  influenza   Vaccine 0.5 milliLiter(s) IntraMuscular once  metoprolol succinate ER 25 milliGRAM(s) Oral daily    MEDICATIONS  (PRN):  heparin   Injectable 9000 Unit(s) IV Push every 6 hours PRN For aPTT less than 40  heparin   Injectable 4000 Unit(s) IV Push every 6 hours PRN For aPTT between 40 - 57      Vital Signs Last 24 Hrs  T(C): 37 (04 Jan 2021 05:01), Max: 37 (04 Jan 2021 05:01)  T(F): 98.6 (04 Jan 2021 05:01), Max: 98.6 (04 Jan 2021 05:01)  HR: 75 (04 Jan 2021 05:01) (67 - 75)  BP: 115/62 (04 Jan 2021 05:01) (115/62 - 133/77)  BP(mean): --  RR: 17 (04 Jan 2021 05:01) (17 - 18)  SpO2: 100% (04 Jan 2021 05:01) (97% - 100%)  CAPILLARY BLOOD GLUCOSE        I&O's Summary    03 Jan 2021 07:01  -  04 Jan 2021 07:00  --------------------------------------------------------  IN: 1010 mL / OUT: 350 mL / NET: 660 mL        PHYSICAL EXAM:  HEAD:  Atraumatic, Normocephalic  NECK: Supple, No   JVD  CHEST/LUNG:   no     rales,     no,    rhonchi  HEART: Regular rate and rhythm;         murmur  ABDOMEN: Soft, Nontender, ;   EXTREMITIES:        edema  NEUROLOGY:  alert    LABS:                        7.9    8.60  )-----------( 361      ( 04 Jan 2021 06:41 )             28.3     01-04    137  |  98  |  56<H>  ----------------------------<  129<H>  4.3   |  34<H>  |  1.66<H>    Ca    9.0      04 Jan 2021 06:41    TPro  8.6<H>  /  Alb  3.1<L>  /  TBili  0.9  /  DBili  x   /  AST  18  /  ALT  16  /  AlkPhos  105  01-03    PT/INR - ( 04 Jan 2021 06:41 )   PT: 22.5 sec;   INR: 2.00 ratio         PTT - ( 04 Jan 2021 06:41 )  PTT:97.6 sec                Thyroid Stimulating Hormone, Serum: 1.630 uIU/mL (01-02 @ 06:57)          Consultant(s) Notes Reviewed:      Care Discussed with Consultants/Other Providers:       afebrile/ very pleasant and jovial    REVIEW OF SYSTEMS:  GEN: no fever,    no chills  RESP: no SOB,   no cough  CVS: no chest pain,   no palpitations  GI: no abdominal pain,   no nausea,   no vomiting,   no constipation,   no diarrhea  : no dysuria,   no frequency  NEURO: no headache,   no dizziness  PSYCH: no depression,   not anxious  Derm : no rash    MEDICATIONS  (STANDING):  allopurinol 100 milliGRAM(s) Oral daily  atorvastatin 40 milliGRAM(s) Oral at bedtime  budesonide 160 MICROgram(s)/formoterol 4.5 MICROgram(s) Inhaler 2 Puff(s) Inhalation two times a day  furosemide    Tablet 40 milliGRAM(s) Oral two times a day  heparin  Infusion.  Unit(s)/Hr (19 mL/Hr) IV Continuous <Continuous>  influenza   Vaccine 0.5 milliLiter(s) IntraMuscular once  metoprolol succinate ER 25 milliGRAM(s) Oral daily    MEDICATIONS  (PRN):  heparin   Injectable 9000 Unit(s) IV Push every 6 hours PRN For aPTT less than 40  heparin   Injectable 4000 Unit(s) IV Push every 6 hours PRN For aPTT between 40 - 57      Vital Signs Last 24 Hrs  T(C): 37 (04 Jan 2021 05:01), Max: 37 (04 Jan 2021 05:01)  T(F): 98.6 (04 Jan 2021 05:01), Max: 98.6 (04 Jan 2021 05:01)  HR: 75 (04 Jan 2021 05:01) (67 - 75)  BP: 115/62 (04 Jan 2021 05:01) (115/62 - 133/77)  BP(mean): --  RR: 17 (04 Jan 2021 05:01) (17 - 18)  SpO2: 100% (04 Jan 2021 05:01) (97% - 100%)  CAPILLARY BLOOD GLUCOSE        I&O's Summary    03 Jan 2021 07:01  -  04 Jan 2021 07:00  --------------------------------------------------------  IN: 1010 mL / OUT: 350 mL / NET: 660 mL        PHYSICAL EXAM:  HEAD:  Atraumatic, Normocephalic  NECK: Supple, No   JVD  CHEST/LUNG:   no     rales,     no,    rhonchi  HEART: Regular rate and rhythm;         murmur  ABDOMEN: Soft, Nontender, ;   EXTREMITIES:   less     edema  NEUROLOGY:  alert    LABS:                        7.9    8.60  )-----------( 361      ( 04 Jan 2021 06:41 )             28.3     01-04    137  |  98  |  56<H>  ----------------------------<  129<H>  4.3   |  34<H>  |  1.66<H>    Ca    9.0      04 Jan 2021 06:41    TPro  8.6<H>  /  Alb  3.1<L>  /  TBili  0.9  /  DBili  x   /  AST  18  /  ALT  16  /  AlkPhos  105  01-03    PT/INR - ( 04 Jan 2021 06:41 )   PT: 22.5 sec;   INR: 2.00 ratio         PTT - ( 04 Jan 2021 06:41 )  PTT:97.6 sec                Thyroid Stimulating Hormone, Serum: 1.630 uIU/mL (01-02 @ 06:57)          Consultant(s) Notes Reviewed:      Care Discussed with Consultants/Other Providers:

## 2021-01-05 LAB
ANION GAP SERPL CALC-SCNC: 2 MMOL/L — LOW (ref 5–17)
APTT BLD: 86.2 SEC — HIGH (ref 27.5–35.5)
BLD GP AB SCN SERPL QL: SIGNIFICANT CHANGE UP
BUN SERPL-MCNC: 59 MG/DL — HIGH (ref 7–23)
CALCIUM SERPL-MCNC: 9.3 MG/DL — SIGNIFICANT CHANGE UP (ref 8.5–10.1)
CHLORIDE SERPL-SCNC: 95 MMOL/L — LOW (ref 96–108)
CHOLEST SERPL-MCNC: 126 MG/DL — SIGNIFICANT CHANGE UP
CO2 SERPL-SCNC: 37 MMOL/L — HIGH (ref 22–31)
CREAT SERPL-MCNC: 1.53 MG/DL — HIGH (ref 0.5–1.3)
FERRITIN SERPL-MCNC: 92 NG/ML — SIGNIFICANT CHANGE UP (ref 15–150)
GLUCOSE BLDC GLUCOMTR-MCNC: 144 MG/DL — HIGH (ref 70–99)
GLUCOSE SERPL-MCNC: 113 MG/DL — HIGH (ref 70–99)
HCT VFR BLD CALC: 28 % — LOW (ref 34.5–45)
HCT VFR BLD CALC: 30.4 % — LOW (ref 34.5–45)
HDLC SERPL-MCNC: 64 MG/DL — SIGNIFICANT CHANGE UP
HGB BLD-MCNC: 7.6 G/DL — LOW (ref 11.5–15.5)
HGB BLD-MCNC: 8.6 G/DL — LOW (ref 11.5–15.5)
INR BLD: 2.28 RATIO — HIGH (ref 0.88–1.16)
IRON SATN MFR SERPL: 31 UG/DL — SIGNIFICANT CHANGE UP (ref 30–160)
IRON SATN MFR SERPL: 8 % — LOW (ref 14–50)
LIPID PNL WITH DIRECT LDL SERPL: 49 MG/DL — SIGNIFICANT CHANGE UP
MCHC RBC-ENTMCNC: 25.2 PG — LOW (ref 27–34)
MCHC RBC-ENTMCNC: 25.5 PG — LOW (ref 27–34)
MCHC RBC-ENTMCNC: 27.1 GM/DL — LOW (ref 32–36)
MCHC RBC-ENTMCNC: 28.3 GM/DL — LOW (ref 32–36)
MCV RBC AUTO: 90.2 FL — SIGNIFICANT CHANGE UP (ref 80–100)
MCV RBC AUTO: 92.7 FL — SIGNIFICANT CHANGE UP (ref 80–100)
NON HDL CHOLESTEROL: 62 MG/DL — SIGNIFICANT CHANGE UP
NRBC # BLD: 0 /100 WBCS — SIGNIFICANT CHANGE UP (ref 0–0)
NRBC # BLD: 0 /100 WBCS — SIGNIFICANT CHANGE UP (ref 0–0)
PLATELET # BLD AUTO: 363 K/UL — SIGNIFICANT CHANGE UP (ref 150–400)
PLATELET # BLD AUTO: 389 K/UL — SIGNIFICANT CHANGE UP (ref 150–400)
POTASSIUM SERPL-MCNC: 4.6 MMOL/L — SIGNIFICANT CHANGE UP (ref 3.5–5.3)
POTASSIUM SERPL-SCNC: 4.6 MMOL/L — SIGNIFICANT CHANGE UP (ref 3.5–5.3)
PROTHROM AB SERPL-ACNC: 25.4 SEC — HIGH (ref 10.6–13.6)
RBC # BLD: 3.02 M/UL — LOW (ref 3.8–5.2)
RBC # BLD: 3.37 M/UL — LOW (ref 3.8–5.2)
RBC # FLD: 16.4 % — HIGH (ref 10.3–14.5)
RBC # FLD: 16.4 % — HIGH (ref 10.3–14.5)
SODIUM SERPL-SCNC: 134 MMOL/L — LOW (ref 135–145)
TIBC SERPL-MCNC: 374 UG/DL — SIGNIFICANT CHANGE UP (ref 220–430)
TRIGL SERPL-MCNC: 67 MG/DL — SIGNIFICANT CHANGE UP
UIBC SERPL-MCNC: 343 UG/DL — SIGNIFICANT CHANGE UP (ref 110–370)
WBC # BLD: 6.64 K/UL — SIGNIFICANT CHANGE UP (ref 3.8–10.5)
WBC # BLD: 8.52 K/UL — SIGNIFICANT CHANGE UP (ref 3.8–10.5)
WBC # FLD AUTO: 6.64 K/UL — SIGNIFICANT CHANGE UP (ref 3.8–10.5)
WBC # FLD AUTO: 8.52 K/UL — SIGNIFICANT CHANGE UP (ref 3.8–10.5)

## 2021-01-05 PROCEDURE — 99233 SBSQ HOSP IP/OBS HIGH 50: CPT

## 2021-01-05 PROCEDURE — 99232 SBSQ HOSP IP/OBS MODERATE 35: CPT

## 2021-01-05 RX ORDER — SODIUM CHLORIDE 0.65 %
1 AEROSOL, SPRAY (ML) NASAL
Refills: 0 | Status: DISCONTINUED | OUTPATIENT
Start: 2021-01-05 | End: 2021-01-06

## 2021-01-05 RX ORDER — WARFARIN SODIUM 2.5 MG/1
5 TABLET ORAL ONCE
Refills: 0 | Status: COMPLETED | OUTPATIENT
Start: 2021-01-05 | End: 2021-01-05

## 2021-01-05 RX ORDER — FUROSEMIDE 40 MG
60 TABLET ORAL
Refills: 0 | Status: DISCONTINUED | OUTPATIENT
Start: 2021-01-05 | End: 2021-01-06

## 2021-01-05 RX ADMIN — HEPARIN SODIUM 1200 UNIT(S)/HR: 5000 INJECTION INTRAVENOUS; SUBCUTANEOUS at 11:54

## 2021-01-05 RX ADMIN — Medication 100 MILLIGRAM(S): at 14:03

## 2021-01-05 RX ADMIN — BUDESONIDE AND FORMOTEROL FUMARATE DIHYDRATE 2 PUFF(S): 160; 4.5 AEROSOL RESPIRATORY (INHALATION) at 17:28

## 2021-01-05 RX ADMIN — Medication 25 MILLIGRAM(S): at 05:53

## 2021-01-05 RX ADMIN — BUDESONIDE AND FORMOTEROL FUMARATE DIHYDRATE 2 PUFF(S): 160; 4.5 AEROSOL RESPIRATORY (INHALATION) at 07:30

## 2021-01-05 RX ADMIN — Medication 40 MILLIGRAM(S): at 05:52

## 2021-01-05 RX ADMIN — ATORVASTATIN CALCIUM 40 MILLIGRAM(S): 80 TABLET, FILM COATED ORAL at 22:35

## 2021-01-05 RX ADMIN — Medication 60 MILLIGRAM(S): at 17:28

## 2021-01-05 NOTE — DISCHARGE NOTE PROVIDER - DETAILS OF MALNUTRITION DIAGNOSIS/DIAGNOSES
This patient has been assessed with a concern for Malnutrition and was treated during this hospitalization for the following Nutrition diagnosis/diagnoses:     -  12/31/2020: Morbid obesity (BMI > 40)

## 2021-01-05 NOTE — PROGRESS NOTE ADULT - REASON FOR ADMISSION
chest pain

## 2021-01-05 NOTE — PROGRESS NOTE ADULT - ASSESSMENT
70 year old Female with history of CAD, mechanical MVR andTVR, status post atrial fibrillation ablation back in atrial fibrillation at times rapid ventricular response, COPD, hypertension, dyslipidemia, gout, chronic kidney disease presented with chest pain dizziness and shortness of breath evidence of tachybradycardia times, adjustments of medications with improvement in atrial fibrillation status. She also had epistaxis requiring packed red blood cell support.  Pt also with subtherapeutic INR requiring IV heparin for AC in setting of mechanical Valve  Echo with preserved EF, Grade II DD, mild MR/TR/AR, mod MS    PLAN:     Continue telemetry monitoring to assess dysrhythmias. Atrial fibrillation seems rate controlled, high 30s at times while sleeping, likely sleep apnea induced,  will monitor pulse oximetry    Cont on Lasix 40 mg p.o. b.i.d. to help keep negative fluid status.   cont supplemental O2/inhaler   Cont Toprol 25 mg once daily and  remains off digoxin.   Restart  ACE-I when creat stabilized   Continue IV heparin and warfarin with goal INR of 2.5-3.5 with mechanical MVR. Awaiting therapeutic INR values.  Cont on atorvastatin 40 mg daily for now, lipid panel. No clear evidence of ACS present.  Activity as tolerated.       70 year old Female with history of CAD, mechanical MVR andTVR, status post atrial fibrillation ablation back in atrial fibrillation at times rapid ventricular response, COPD, MIGUEL, hypertension, dyslipidemia, gout, chronic kidney disease presented with chest pain dizziness and shortness of breath evidence of tachybradycardia times, adjustments of medications with improvement in atrial fibrillation status. She also had epistaxis requiring packed red blood cell support.  Pt also with subtherapeutic INR requiring IV heparin for AC in setting of mechanical Valve  Echo with preserved EF, Grade II DD, mild MR/TR/AR, mild - mod MS    PLAN:     Continue telemetry monitoring to assess dysrhythmias. Atrial fibrillation seems rate controlled, high 30s at times while sleeping, pt with h/o MIGUEL, used to have BiPAP, but has not been using it for a longer period of time , will obtain ABG to evaluate if pt is eligible for NIV Trilogy    Cont on IV Lasix 40 mg b.i.d. to help keep negative fluid status.   cont supplemental O2/inhaler   Cont Toprol 25 mg once daily and  remains off digoxin. Will hold Toprol If bradycardia occurs while awake as well  Restart  ACE-I when creat stabilized   Continue IV heparin and warfarin with goal INR of 2.5-3.5 with mechanical MVR. Awaiting therapeutic INR values.  Cont on atorvastatin 40 mg daily for now, lipid panel. No clear evidence of ACS present.  Activity as tolerated.       70 year old Female with history of CAD, mechanical MVR andTVR, status post atrial fibrillation ablation back in atrial fibrillation at times rapid ventricular response, COPD, MIGUEL, hypertension, dyslipidemia, gout, chronic kidney disease presented with chest pain dizziness and shortness of breath evidence of tachybradycardia times, adjustments of medications with improvement in atrial fibrillation status. She also had epistaxis requiring packed red blood cell support.  Pt also with subtherapeutic INR requiring IV heparin for AC in setting of mechanical Valve  Echo with preserved EF, Grade II DD, mild MR/TR/AR, mild - mod MS, severe pHTN  With persistent SOB    PLAN:     Continue telemetry monitoring to assess dysrhythmias. Atrial fibrillation seems rate controlled, high 30s at times while sleeping, pt with h/o MIGUEL, used to have BiPAP, but has not been using it for a longer period of time , will obtain ABG to evaluate if pt is eligible for NIV Trilogy    Cont on IV Lasix b.i.d. to help keep negative fluid status.   cont supplemental O2/inhaler   Hold Toprol 25 mg for now and  remains off digoxin.   Restart  ACE-I when creat stabilized   Continue IV heparin and warfarin with goal INR of 2.5-3.5 with mechanical MVR. Awaiting therapeutic INR values.  Cont on atorvastatin 40 mg daily for now, lipid panel. No clear evidence of ACS present.  Activity as tolerated  Pt is for transfer to [facility] for further cardiac w/u  and valvular evaluation including ELIZABETH

## 2021-01-05 NOTE — PHYSICAL THERAPY INITIAL EVALUATION ADULT - ADDITIONAL COMMENTS
pt lives with spouse and her children in a private home, pt was indep in ADL, transfers and able to amb without AD, but has SAC and RW at home.

## 2021-01-05 NOTE — PROGRESS NOTE ADULT - SUBJECTIVE AND OBJECTIVE BOX
afebrile   no  cp/sob  REVIEW OF SYSTEMS:  GEN: no fever,    no chills  RESP: no SOB,   no cough  CVS: no chest pain,   no palpitations  GI: no abdominal pain,   no nausea,   no vomiting,   no constipation,   no diarrhea  : no dysuria,   no frequency  NEURO: no headache,   no dizziness  PSYCH: no depression,   not anxious  Derm : no rash    MEDICATIONS  (STANDING):  allopurinol 100 milliGRAM(s) Oral daily  atorvastatin 40 milliGRAM(s) Oral at bedtime  budesonide 160 MICROgram(s)/formoterol 4.5 MICROgram(s) Inhaler 2 Puff(s) Inhalation two times a day  furosemide   Injectable 40 milliGRAM(s) IV Push two times a day  heparin  Infusion.  Unit(s)/Hr (19 mL/Hr) IV Continuous <Continuous>  influenza   Vaccine 0.5 milliLiter(s) IntraMuscular once  metoprolol succinate ER 25 milliGRAM(s) Oral daily    MEDICATIONS  (PRN):  heparin   Injectable 9000 Unit(s) IV Push every 6 hours PRN For aPTT less than 40  heparin   Injectable 4000 Unit(s) IV Push every 6 hours PRN For aPTT between 40 - 57  sodium chloride 0.65% Nasal 1 Spray(s) Both Nostrils every 6 hours PRN Nasal Congestion      Vital Signs Last 24 Hrs  T(C): 36.9 (05 Jan 2021 05:25), Max: 37.2 (05 Jan 2021 00:38)  T(F): 98.4 (05 Jan 2021 05:25), Max: 99 (05 Jan 2021 00:38)  HR: 70 (05 Jan 2021 05:51) (68 - 77)  BP: 124/80 (05 Jan 2021 05:51) (103/53 - 158/65)  BP(mean): --  RR: 18 (05 Jan 2021 05:25) (18 - 18)  SpO2: 100% (05 Jan 2021 05:25) (95% - 100%)  CAPILLARY BLOOD GLUCOSE        I&O's Summary    04 Jan 2021 07:01  -  05 Jan 2021 07:00  --------------------------------------------------------  IN: 720 mL / OUT: 500 mL / NET: 220 mL        PHYSICAL EXAM:  HEAD:  Atraumatic, Normocephalic  NECK: Supple, No   JVD  CHEST/LUNG:   no     rales,     no,    rhonchi  HEART: Regular rate and rhythm;         murmur  ABDOMEN: Soft, Nontender, ;   EXTREMITIES:    no    edema  NEUROLOGY:  alert    LABS:                        7.9    8.60  )-----------( 361      ( 04 Jan 2021 06:41 )             28.3     01-04    137  |  98  |  56<H>  ----------------------------<  129<H>  4.3   |  34<H>  |  1.66<H>    Ca    9.0      04 Jan 2021 06:41      PT/INR - ( 04 Jan 2021 06:41 )   PT: 22.5 sec;   INR: 2.00 ratio         PTT - ( 04 Jan 2021 06:41 )  PTT:97.6 sec                Thyroid Stimulating Hormone, Serum: 1.630 uIU/mL (01-02 @ 06:57)          Consultant(s) Notes Reviewed:      Care Discussed with Consultants/Other Providers:       afebrile   no  cp/sob  REVIEW OF SYSTEMS:  GEN: no fever,    no chills  RESP: no SOB,   no cough  CVS: no chest pain,   no palpitations  GI: no abdominal pain,   no nausea,   no vomiting,   no constipation,   no diarrhea  : no dysuria,   no frequency  NEURO: no headache,   no dizziness  PSYCH: no depression,   not anxious  Derm : no rash    MEDICATIONS  (STANDING):  allopurinol 100 milliGRAM(s) Oral daily  atorvastatin 40 milliGRAM(s) Oral at bedtime  budesonide 160 MICROgram(s)/formoterol 4.5 MICROgram(s) Inhaler 2 Puff(s) Inhalation two times a day  furosemide   Injectable 40 milliGRAM(s) IV Push two times a day  heparin  Infusion.  Unit(s)/Hr (19 mL/Hr) IV Continuous <Continuous>  influenza   Vaccine 0.5 milliLiter(s) IntraMuscular once  metoprolol succinate ER 25 milliGRAM(s) Oral daily    MEDICATIONS  (PRN):  heparin   Injectable 9000 Unit(s) IV Push every 6 hours PRN For aPTT less than 40  heparin   Injectable 4000 Unit(s) IV Push every 6 hours PRN For aPTT between 40 - 57  sodium chloride 0.65% Nasal 1 Spray(s) Both Nostrils every 6 hours PRN Nasal Congestion      Vital Signs Last 24 Hrs  T(C): 36.9 (05 Jan 2021 05:25), Max: 37.2 (05 Jan 2021 00:38)  T(F): 98.4 (05 Jan 2021 05:25), Max: 99 (05 Jan 2021 00:38)  HR: 70 (05 Jan 2021 05:51) (68 - 77)  BP: 124/80 (05 Jan 2021 05:51) (103/53 - 158/65)  BP(mean): --  RR: 18 (05 Jan 2021 05:25) (18 - 18)  SpO2: 100% (05 Jan 2021 05:25) (95% - 100%)  CAPILLARY BLOOD GLUCOSE        I&O's Summary    04 Jan 2021 07:01  -  05 Jan 2021 07:00  --------------------------------------------------------  IN: 720 mL / OUT: 500 mL / NET: 220 mL        PHYSICAL EXAM:  HEAD:  Atraumatic, Normocephalic  NECK: Supple, No   JVD  CHEST/LUNG:   no     rales,     no,    rhonchi  HEART: Regular rate and rhythm;         murmur  ABDOMEN: Soft, Nontender, ;   EXTREMITIES:  less  edema  NEUROLOGY:  alert    LABS:                        7.9    8.60  )-----------( 361      ( 04 Jan 2021 06:41 )             28.3     01-04    137  |  98  |  56<H>  ----------------------------<  129<H>  4.3   |  34<H>  |  1.66<H>    Ca    9.0      04 Jan 2021 06:41      PT/INR - ( 04 Jan 2021 06:41 )   PT: 22.5 sec;   INR: 2.00 ratio         PTT - ( 04 Jan 2021 06:41 )  PTT:97.6 sec                Thyroid Stimulating Hormone, Serum: 1.630 uIU/mL (01-02 @ 06:57)          Consultant(s) Notes Reviewed:      Care Discussed with Consultants/Other Providers:       afebrile   no  cp/sob at rest  REVIEW OF SYSTEMS:  GEN: no fever,    no chills  RESP: no SOB,   no cough  CVS: no chest pain,   no palpitations  GI: no abdominal pain,   no nausea,   no vomiting,   no constipation,   no diarrhea  : no dysuria,   no frequency  NEURO: no headache,   no dizziness  PSYCH: no depression,   not anxious  Derm : no rash    MEDICATIONS  (STANDING):  allopurinol 100 milliGRAM(s) Oral daily  atorvastatin 40 milliGRAM(s) Oral at bedtime  budesonide 160 MICROgram(s)/formoterol 4.5 MICROgram(s) Inhaler 2 Puff(s) Inhalation two times a day  furosemide   Injectable 40 milliGRAM(s) IV Push two times a day  heparin  Infusion.  Unit(s)/Hr (19 mL/Hr) IV Continuous <Continuous>  influenza   Vaccine 0.5 milliLiter(s) IntraMuscular once  metoprolol succinate ER 25 milliGRAM(s) Oral daily    MEDICATIONS  (PRN):  heparin   Injectable 9000 Unit(s) IV Push every 6 hours PRN For aPTT less than 40  heparin   Injectable 4000 Unit(s) IV Push every 6 hours PRN For aPTT between 40 - 57  sodium chloride 0.65% Nasal 1 Spray(s) Both Nostrils every 6 hours PRN Nasal Congestion      Vital Signs Last 24 Hrs  T(C): 36.9 (05 Jan 2021 05:25), Max: 37.2 (05 Jan 2021 00:38)  T(F): 98.4 (05 Jan 2021 05:25), Max: 99 (05 Jan 2021 00:38)  HR: 70 (05 Jan 2021 05:51) (68 - 77)  BP: 124/80 (05 Jan 2021 05:51) (103/53 - 158/65)  BP(mean): --  RR: 18 (05 Jan 2021 05:25) (18 - 18)  SpO2: 100% (05 Jan 2021 05:25) (95% - 100%)  CAPILLARY BLOOD GLUCOSE        I&O's Summary    04 Jan 2021 07:01  -  05 Jan 2021 07:00  --------------------------------------------------------  IN: 720 mL / OUT: 500 mL / NET: 220 mL        PHYSICAL EXAM:  HEAD:  Atraumatic, Normocephalic  NECK: Supple, No   JVD  CHEST/LUNG:   no     rales,     no,    rhonchi  HEART: Regular rate and rhythm;         murmur  ABDOMEN: Soft, Nontender, ;   EXTREMITIES:  less  edema  NEUROLOGY:  alert    LABS:                        7.9    8.60  )-----------( 361      ( 04 Jan 2021 06:41 )             28.3     01-04    137  |  98  |  56<H>  ----------------------------<  129<H>  4.3   |  34<H>  |  1.66<H>    Ca    9.0      04 Jan 2021 06:41      PT/INR - ( 04 Jan 2021 06:41 )   PT: 22.5 sec;   INR: 2.00 ratio         PTT - ( 04 Jan 2021 06:41 )  PTT:97.6 sec                Thyroid Stimulating Hormone, Serum: 1.630 uIU/mL (01-02 @ 06:57)          Consultant(s) Notes Reviewed:      Care Discussed with Consultants/Other Providers:       afebrile   no  cp/  ha s  sob on minimal exertion  REVIEW OF SYSTEMS:  GEN: no fever,    no chills  RESP: no SOB,   no cough  CVS: no chest pain,   no palpitations  GI: no abdominal pain,   no nausea,   no vomiting,   no constipation,   no diarrhea  : no dysuria,   no frequency  NEURO: no headache,   no dizziness  PSYCH: no depression,   not anxious  Derm : no rash    MEDICATIONS  (STANDING):  allopurinol 100 milliGRAM(s) Oral daily  atorvastatin 40 milliGRAM(s) Oral at bedtime  budesonide 160 MICROgram(s)/formoterol 4.5 MICROgram(s) Inhaler 2 Puff(s) Inhalation two times a day  furosemide   Injectable 40 milliGRAM(s) IV Push two times a day  heparin  Infusion.  Unit(s)/Hr (19 mL/Hr) IV Continuous <Continuous>  influenza   Vaccine 0.5 milliLiter(s) IntraMuscular once  metoprolol succinate ER 25 milliGRAM(s) Oral daily    MEDICATIONS  (PRN):  heparin   Injectable 9000 Unit(s) IV Push every 6 hours PRN For aPTT less than 40  heparin   Injectable 4000 Unit(s) IV Push every 6 hours PRN For aPTT between 40 - 57  sodium chloride 0.65% Nasal 1 Spray(s) Both Nostrils every 6 hours PRN Nasal Congestion      Vital Signs Last 24 Hrs  T(C): 36.9 (05 Jan 2021 05:25), Max: 37.2 (05 Jan 2021 00:38)  T(F): 98.4 (05 Jan 2021 05:25), Max: 99 (05 Jan 2021 00:38)  HR: 70 (05 Jan 2021 05:51) (68 - 77)  BP: 124/80 (05 Jan 2021 05:51) (103/53 - 158/65)  BP(mean): --  RR: 18 (05 Jan 2021 05:25) (18 - 18)  SpO2: 100% (05 Jan 2021 05:25) (95% - 100%)  CAPILLARY BLOOD GLUCOSE        I&O's Summary    04 Jan 2021 07:01  -  05 Jan 2021 07:00  --------------------------------------------------------  IN: 720 mL / OUT: 500 mL / NET: 220 mL        PHYSICAL EXAM:  HEAD:  Atraumatic, Normocephalic  NECK: Supple, No   JVD  CHEST/LUNG:   no     rales,     no,    rhonchi  HEART: Regular rate and rhythm;         murmur  ABDOMEN: Soft, Nontender, ;   EXTREMITIES:  less  edema  NEUROLOGY:  alert    LABS:                        7.9    8.60  )-----------( 361      ( 04 Jan 2021 06:41 )             28.3     01-04    137  |  98  |  56<H>  ----------------------------<  129<H>  4.3   |  34<H>  |  1.66<H>    Ca    9.0      04 Jan 2021 06:41      PT/INR - ( 04 Jan 2021 06:41 )   PT: 22.5 sec;   INR: 2.00 ratio         PTT - ( 04 Jan 2021 06:41 )  PTT:97.6 sec                Thyroid Stimulating Hormone, Serum: 1.630 uIU/mL (01-02 @ 06:57)          Consultant(s) Notes Reviewed:      Care Discussed with Consultants/Other Providers:

## 2021-01-05 NOTE — PROGRESS NOTE ADULT - PROVIDER SPECIALTY LIST ADULT
Cardiology
Cardiology
Hospitalist
Neurology
Cardiology
Hospitalist
Internal Medicine
Neurology
Cardiology
Internal Medicine
Gastroenterology
Hospitalist

## 2021-01-05 NOTE — PROGRESS NOTE ADULT - ASSESSMENT
Subjective Complaints:  Historian:             Vital Signs Last 24 Hrs  T(C): 37 (05 Jan 2021 20:42), Max: 37.2 (05 Jan 2021 00:38)  T(F): 98.6 (05 Jan 2021 20:42), Max: 99 (05 Jan 2021 00:38)  HR: 72 (05 Jan 2021 20:42) (63 - 98)  BP: 148/76 (05 Jan 2021 20:42) (103/53 - 148/76)  BP(mean): --  RR: 18 (05 Jan 2021 20:42) (18 - 20)  SpO2: 100% (05 Jan 2021 20:42) (94% - 100%)    GENERAL PHYSICAL EXAM:  General:  Appears stated age, well-groomed, well-nourished, no distress  HEENT:  NC/AT, patent nares w/ pink mucosa, OP clear w/o lesions, PERRL, EOMI, conjunctivae clear, no thyromegaly, nodules, adenopathy, no JVD  Chest:  Full & symmetric excursion, no increased effort, breath sounds clear  Cardiovascular:  Regular rhythm, S1, S2, no murmur/rub/S3/S4, no carotid/femoral/abdominal bruit, radial/pedal pulses 2+, no edema  Abdomen:  Soft, non-tender, non-distended, normoactive bowel sounds, no HSM  Extremities:  Gait & station:   Digits:   Nails:   Joints, Bones, Muscles:   ROM:   Stability:  Skin:  No rash/erythema/ecchymoses/petechiae/wounds/abscess/warm/dry  Musculoskeletal:  Full ROM in all joints w/o swelling/tenderness/effusion        LABS:                        8.6    8.52  )-----------( 389      ( 05 Jan 2021 21:57 )             30.4     01-05    134<L>  |  95<L>  |  59<H>  ----------------------------<  113<H>  4.6   |  37<H>  |  1.53<H>    Ca    9.3      05 Jan 2021 10:33      PT/INR - ( 05 Jan 2021 10:33 )   PT: 25.4 sec;   INR: 2.28 ratio         PTT - ( 05 Jan 2021 10:33 )  PTT:86.2 sec      RADIOLOGY & ADDITIONAL STUDIES:        Neurology Progress Note:      Mental Status: awaek alert speech fluent  folows commands       Cranial Nerves: 2 12intact      Motor:   arm leg 4/5         Sensory: intact      Cerebellar:  defrd      Gait: unsteady       Assesment/Plan:  events noted bleding from nose packing  arm leg 4/5 no seizure copd  on coumadin for afib  will follow

## 2021-01-05 NOTE — DISCHARGE NOTE PROVIDER - NSDCMRMEDTOKEN_GEN_ALL_CORE_FT
allopurinol 100 mg oral tablet: 1 tab(s) orally once a day  budesonide-formoterol 160 mcg-4.5 mcg/inh inhalation aerosol: 2  inhaled 2 times a day  Coumadin 5 mg oral tablet: 1 tab(s) orally once a day  digoxin 125 mcg (0.125 mg) oral tablet: 1 tab(s) orally once a day  Klor-Con M20 oral tablet, extended release: 1 tab(s) orally once a day  Lasix 40 mg oral tablet: 2 tab(s) orally once a day  metoprolol succinate 25 mg oral tablet, extended release: 1 tab(s) orally once a day  ramipril 5 mg oral tablet: 1 tab(s) orally once a day  simvastatin 5 mg oral tablet: 1 tab(s) orally once a day (at bedtime)

## 2021-01-05 NOTE — PROGRESS NOTE ADULT - SUBJECTIVE AND OBJECTIVE BOX
Patient is a 70y old  Female who presents with a chief complaint of chest pain (05 Jan 2021 14:14)      HPI:   70 year old female with PMH of CAD with Mitral and tricuspid valve repair hx  on coumadin, INR goal 2.5-3.5, afib s/p ablation, COPD on 3L home O2, HTN, HLD, Gout, CKD 3,  otherwise presenting to ED due to chest pain on left with associated dizziness, mild headaches and SOB with exertion for past 1 week without fever/chills but symptoms worsening so pt came in for evaluation.     In the ED, pt had labwork which revealed INR 1.7, trop 0.015, ekg RBBB ?chronic, no st changes.   (29 Dec 2020 18:30)      INTERVAL HPI/OVERNIGHT EVENTS:  Patient seen earlier today  SOB. The patient denies melena, hematochezia, hematemesis, nausea, vomiting, abdominal pain, constipation, diarrhea, or change in bowel movements     MEDICATIONS  (STANDING):  allopurinol 100 milliGRAM(s) Oral daily  atorvastatin 40 milliGRAM(s) Oral at bedtime  budesonide 160 MICROgram(s)/formoterol 4.5 MICROgram(s) Inhaler 2 Puff(s) Inhalation two times a day  furosemide   Injectable 60 milliGRAM(s) IV Push two times a day  heparin  Infusion.  Unit(s)/Hr (19 mL/Hr) IV Continuous <Continuous>  influenza   Vaccine 0.5 milliLiter(s) IntraMuscular once  warfarin 5 milliGRAM(s) Oral once    MEDICATIONS  (PRN):  heparin   Injectable 9000 Unit(s) IV Push every 6 hours PRN For aPTT less than 40  heparin   Injectable 4000 Unit(s) IV Push every 6 hours PRN For aPTT between 40 - 57  sodium chloride 0.65% Nasal 1 Spray(s) Both Nostrils four times a day PRN Nasal Congestion      FAMILY HISTORY:  Family history of hypertension (Mother)    Family history of stroke    Family history of hypertension (Father, Sibling)        Allergies    No Known Allergies    Intolerances        PMH/PSH:  COPD (chronic obstructive pulmonary disease)    Hypertension    CHF (congestive heart failure)    Tricuspid valve replaced    Mitral valve replaced    Gout    HTN (hypertension)    CHF (congestive heart failure)    COPD (chronic obstructive pulmonary disease)    MIGUEL (obstructive sleep apnea)    Pulmonary hypertension    Atrial fibrillation    No significant past surgical history    Tricuspid valve replaced    History of mitral valve replacement with mechanical valve    No significant past surgical history          REVIEW OF SYSTEMS:  CONSTITUTIONAL: No fever, weight loss,   EYES: No eye pain, visual disturbances, or discharge  ENMT:  No difficulty hearing, tinnitus, vertigo; No sinus or throat pain  NECK: No pain or stiffness  BREASTS: No pain, masses, or nipple discharge  RESPIRATORY: No cough, wheezing, chills or hemoptysis;   CARDIOVASCULAR: No chest pain, palpitations, dizziness, or leg swelling  GASTROINTESTINAL:  See above  GENITOURINARY: No dysuria, frequency, hematuria, or incontinence  NEUROLOGICAL: No headaches, memory loss, loss of strength, numbness, or tremors  SKIN: No itching, burning, rashes, or lesions   LYMPH NODES: No enlarged glands  ENDOCRINE: No heat or cold intolerance; No hair loss  MUSCULOSKELETAL: No joint pain or swelling; No muscle, back, or extremity pain  PSYCHIATRIC: No depression, anxiety, mood swings, or difficulty sleeping  HEME/LYMPH: No easy bruising, or bleeding gums  ALLERGY AND IMMUNOLOGIC: No hives or eczema    Vital Signs Last 24 Hrs  T(C): 37 (05 Jan 2021 20:42), Max: 37.2 (05 Jan 2021 00:38)  T(F): 98.6 (05 Jan 2021 20:42), Max: 99 (05 Jan 2021 00:38)  HR: 72 (05 Jan 2021 20:42) (63 - 98)  BP: 148/76 (05 Jan 2021 20:42) (103/53 - 148/76)  BP(mean): --  RR: 18 (05 Jan 2021 20:42) (18 - 20)  SpO2: 100% (05 Jan 2021 20:42) (94% - 100%)    PHYSICAL EXAM:  GENERAL: NAD, well-groomed, well-developed  HEAD:  Atraumatic, Normocephalic  EYES: EOMI, PERRLA, conjunctiva and sclera clear  ENMT: No tonsillar erythema, exudates, or enlargement; Moist mucous membranes, Good dentition, No lesions  NECK: Supple, No JVD, Normal thyroid  NERVOUS SYSTEM:  Alert & Oriented X3, Good concentration; Motor Strength 5/5 B/L upper and lower extremities;   CHEST/LUNG: I & E rhonchi,   HEART: Regular rate and rhythm; No murmurs, rubs, or gallops  ABDOMEN: Soft, Nontender, Nondistended; Bowel sounds present  EXTREMITIES:  2+ Peripheral Pulses, No clubbing, cyanosis,  2+ edema  LYMPH: No lymphadenopathy noted  SKIN: No rashes or lesions    LAB                          7.6    6.64  )-----------( 363      ( 05 Jan 2021 10:33 )             28.0       CBC:  01-05 @ 10:33  WBC 6.64   Hgb 7.6   Hct 28.0   Plts 363  MCV 92.7  01-04 @ 06:41  WBC 8.60   Hgb 7.9   Hct 28.3   Plts 361  MCV 92.5  01-03 @ 07:23  WBC 9.83   Hgb 8.3   Hct 29.5   Plts 394  MCV 91.0  01-02 @ 06:57  WBC 8.64   Hgb 7.1   Hct 25.8   Plts 362  MCV 90.8  01-01 @ 07:34  WBC 8.33   Hgb 7.8   Hct 27.5   Plts 381  MCV 88.1  12-31 @ 06:21  WBC 7.76   Hgb 8.0   Hct 28.2   Plts 396  MCV 87.6  12-30 @ 21:27  WBC 7.70   Hgb 8.0   Hct 28.8   Plts 393  MCV 90.3  12-29 @ 22:05  WBC 9.37   Hgb 8.2   Hct 29.2   Plts 379  MCV 90.7      Chemistry:  01-05 @ 10:33  Na+ 134  K+ 4.6  Cl- 95  CO2 37  BUN 59  Cr 1.53     01-04 @ 06:41  Na+ 137  K+ 4.3  Cl- 98  CO2 34  BUN 56  Cr 1.66     01-03 @ 07:23  Na+ 137  K+ 4.4  Cl- 99  CO2 32  BUN 51  Cr 1.47     01-02 @ 06:57  Na+ 135  K+ 3.9  Cl- 96  CO2 37  BUN 47  Cr 1.46     01-01 @ 07:34  Na+ 136  K+ 3.9  Cl- 95  CO2 37  BUN 42  Cr 1.50     12-31 @ 06:21  Na+ 137  K+ 4.0  Cl- 98  CO2 33  BUN 42  Cr 1.49     12-30 @ 07:24  Na+ 135  K+ 4.2  Cl- 96  CO2 37  BUN 41  Cr 1.35         Glucose, Serum: 113 mg/dL (01-05 @ 10:33)  Glucose, Serum: 129 mg/dL (01-04 @ 06:41)  Glucose, Serum: 99 mg/dL (01-03 @ 07:23)  Glucose, Serum: 89 mg/dL (01-02 @ 06:57)  Glucose, Serum: 138 mg/dL (01-01 @ 07:34)  Glucose, Serum: 118 mg/dL (12-31 @ 06:21)  Glucose, Serum: 102 mg/dL (12-30 @ 07:24)      05 Jan 2021 10:33    134    |  95     |  59     ----------------------------<  113    4.6     |  37     |  1.53   04 Jan 2021 06:41    137    |  98     |  56     ----------------------------<  129    4.3     |  34     |  1.66   03 Jan 2021 07:23    137    |  99     |  51     ----------------------------<  99     4.4     |  32     |  1.47   02 Jan 2021 06:57    135    |  96     |  47     ----------------------------<  89     3.9     |  37     |  1.46   01 Jan 2021 07:34    136    |  95     |  42     ----------------------------<  138    3.9     |  37     |  1.50   31 Dec 2020 06:21    137    |  98     |  42     ----------------------------<  118    4.0     |  33     |  1.49   30 Dec 2020 07:24    135    |  96     |  41     ----------------------------<  102    4.2     |  37     |  1.35     Ca    9.3        05 Jan 2021 10:33  Ca    9.0        04 Jan 2021 06:41  Ca    9.1        03 Jan 2021 07:23  Ca    9.2        02 Jan 2021 06:57  Ca    9.6        01 Jan 2021 07:34  Ca    9.8        31 Dec 2020 06:21  Ca    9.3        30 Dec 2020 07:24  Phos  2.8       31 Dec 2020 06:21  Mg     2.2       31 Dec 2020 06:21    TPro  8.6    /  Alb  3.1    /  TBili  0.9    /  DBili  x      /  AST  18     /  ALT  16     /  AlkPhos  105    03 Jan 2021 07:23  TPro  9.0    /  Alb  3.3    /  TBili  0.5    /  DBili  x      /  AST  13     /  ALT  31     /  AlkPhos  122    30 Dec 2020 07:24      PT/INR - ( 05 Jan 2021 10:33 )   PT: 25.4 sec;   INR: 2.28 ratio         PTT - ( 05 Jan 2021 10:33 )  PTT:86.2 sec        CAPILLARY BLOOD GLUCOSE      POCT Blood Glucose.: 144 mg/dL (05 Jan 2021 17:05)          RADIOLOGY & ADDITIONAL TESTS:    Imaging Personally Reviewed:  [ ] YES  [ ] NO    Consultant(s) Notes Reviewed:  [ ] YES  [ ] NO    Care Discussed with Consultants/Other Providers [ ] YES  [ ] NO

## 2021-01-05 NOTE — CHART NOTE - NSCHARTNOTEFT_GEN_A_CORE
Medicine PA Note    Pt is 70 year old Female with history of CAD, mechanical MVR andTVR, status post atrial fibrillation ablation back in atrial fibrillation at times rapid ventricular response, COPD, MIGUEL, hypertension, dyslipidemia, gout, chronic kidney disease presented with chest pain dizziness and shortness of breath evidence of tachybradycardia times, adjustments of medications with improvement in atrial fibrillation status. She also had epistaxis requiring packed red blood cell support. Pt also with subtherapeutic INR requiring IV heparin for AC in setting of mechanical Valve. Echo with preserved EF, Grade II DD, mild MR/TR/AR, mild - mod MS, severe pHTN . pt admitted for Cp , sob and dizziness. Medicine PA Note    Pt is 70 year old Female with history of CAD, mechanical MVR andTVR, status post atrial fibrillation ablation back in atrial fibrillation at times rapid ventricular response, COPD, MIGUEL, hypertension, dyslipidemia, gout, chronic kidney disease presented with chest pain dizziness and shortness of breath evidence of tachybradycardia times, adjustments of medications with improvement in atrial fibrillation status. She also had epistaxis requiring packed red blood cell support. Pt also with subtherapeutic INR requiring IV heparin for AC in setting of mechanical Valve. Echo with preserved EF, Grade II DD, mild MR/TR/AR, mild - mod MS, severe pHTN . pt admitted for Cp , sob and dizziness. Patient required nasal packing s/p epistaxis and 1 unit of PRBCS. Patient again with heavy bleeding and nasal clots. Pt denies headache, dizziness, nausea, vomiting, blurry vision, cp sob, or abd pain, Medicine PA Note    Pt is 70 year old Female with history of CAD, mechanical MVR andTVR, status post atrial fibrillation ablation back in atrial fibrillation at times rapid ventricular response, COPD, MIGUEL, hypertension, dyslipidemia, gout, chronic kidney disease presented with chest pain dizziness and shortness of breath evidence of tachybradycardia times, adjustments of medications with improvement in atrial fibrillation status. She also had epistaxis requiring packed red blood cell support. Pt also with subtherapeutic INR requiring IV heparin for AC in setting of mechanical Valve. Echo with preserved EF, Grade II DD, mild MR/TR/AR, mild - mod MS, severe pHTN . pt admitted for Cp , sob and dizziness. Patient required nasal packing s/p epistaxis and 1 unit of PRBCS. Patient again with heavy bleeding and nasal clots. Pt denies headache, dizziness, nausea, vomiting, blurry vision, cp sob, or abd pain,    /76, 72, 18, 100 NC  98.6    Gen Patient lying in bed Medicine PA Note    Pt is 70 year old Female with history of CAD, mechanical MVR andTVR, status post atrial fibrillation ablation back in atrial fibrillation at times rapid ventricular response, COPD, MIGUEL, hypertension, dyslipidemia, gout, chronic kidney disease presented with chest pain dizziness and shortness of breath evidence of tachybradycardia times, adjustments of medications with improvement in atrial fibrillation status. She also had epistaxis requiring packing and red blood cell support. Pt also with subtherapeutic INR requiring IV heparin for AC in setting of mechanical Valve. Echo with preserved EF, Grade II DD, mild MR/TR/AR, mild - mod MS, severe pHTN . pt admitted for Cp , sob and dizziness. . Patient again with heavy bleeding and nasal clots. Pt denies headache, dizziness, nausea, vomiting, blurry vision, cp sob, or abd pain,    /76, 72, 18, 100 NC  98.6    Gen Patient lying in bed holding compress to nose with ice pack  Head AT/NC  Eyes clear conjunctiva, PERRLA EOMI  ENMT rt nose actively bleeding with clots , small drip to left nostril. tongue midline   Lungs CTA B/L no rhonchi , no wheezing, no rales  CV s1 s2 irreg HR , mechanical click  Abd soft nontender nondistended BS in all 4 quadrants  Ext no calf tenderness +  Lower leg edema b/l   Neuro Alert, awake and oriented x3 movement of all extremities , sensory intact.    a/p  70 year old Female with history of CAD, mechanical MVR andTVR, status post atrial fibrillation ablation back in atrial fibrillation at times rapid ventricular response, COPD, MIGUEL, hypertension, dyslipidemia, gout, chronic kidney disease presented with chest pain dizziness and shortness of breath evidence of tachybradycardia times, adjustments of medications with improvement in atrial fibrillation status. She also had epistaxis requiring packing and red blood cell support.  Patient now again with epistaxis ,   5.5 rhino rocket inserted in Rt nostril without incident. Patient tolerated procedure well.  Heparin drip stopped as per order if bleeding occurs.  Will obtain stat cbc  will continue to monitor the patient  Dr Alonzo made aware   [facility] Medicine PA Note    Pt is 70 year old Female with history of CAD, mechanical MVR andTVR, status post atrial fibrillation ablation back in atrial fibrillation at times rapid ventricular response, COPD, MIGUEL, hypertension, dyslipidemia, gout, chronic kidney disease presented with chest pain dizziness and shortness of breath evidence of tachybradycardia times, adjustments of medications with improvement in atrial fibrillation status. She also had epistaxis requiring packing and red blood cell support. Pt also with subtherapeutic INR requiring IV heparin for AC in setting of mechanical Valve. Echo with preserved EF, Grade II DD, mild MR/TR/AR, mild - mod MS, severe pHTN . pt admitted for Cp , sob and dizziness. . Patient again with heavy bleeding and nasal clots. Pt denies headache, dizziness, nausea, vomiting, blurry vision, cp sob, or abd pain,    /76, 72, 18, 100 NC  98.6    Gen Patient lying in bed holding compress to nose with ice pack  Head AT/NC  Eyes clear conjunctiva, PERRLA EOMI  ENMT rt nose actively bleeding with clots , small drip to left nostril. tongue midline   Lungs CTA B/L no rhonchi , no wheezing, no rales  CV s1 s2 irreg HR , mechanical click  Abd soft nontender nondistended BS in all 4 quadrants  Ext no calf tenderness +  Lower leg edema b/l   Neuro Alert, awake and oriented x3 movement of all extremities , sensory intact.    a/p  70 year old Female with history of CAD, mechanical MVR andTVR, status post atrial fibrillation ablation back in atrial fibrillation at times rapid ventricular response, COPD, MIGUEL, hypertension, dyslipidemia, gout, chronic kidney disease presented with chest pain dizziness and shortness of breath evidence of tachybradycardia times, adjustments of medications with improvement in atrial fibrillation status. She also had epistaxis requiring packing and red blood cell support.  Patient now again with epistaxis ,   5.5 rhino rocket inserted in Rt nostril without incident. Patient tolerated procedure well.  Heparin drip stopped as per order if bleeding occurs.  Will obtain stat cbc  will continue to monitor the patient  Dr Alonzo made aware   [facility]    Addendum  CBC              8.6    8.52  )-----------( 389      ( 05 Jan 2021 21:57 )                30.4   will restart the heparin and watch for bleeding.  discussed with Dr Cheri Cooley [facility] Medicine PA Note    Pt is 70 year old Female with history of CAD, mechanical MVR andTVR, status post atrial fibrillation ablation back in atrial fibrillation at times rapid ventricular response, COPD, MIGUEL, hypertension, dyslipidemia, gout, chronic kidney disease presented with chest pain dizziness and shortness of breath evidence of tachybradycardia times, adjustments of medications with improvement in atrial fibrillation status. She also had epistaxis requiring packing and red blood cell support. Pt also with subtherapeutic INR requiring IV heparin for AC in setting of mechanical Valve. Echo with preserved EF, Grade II DD, mild MR/TR/AR, mild - mod MS, severe pHTN . pt admitted for Cp , sob and dizziness. . Patient again with heavy bleeding and nasal clots. Pt denies headache, dizziness, nausea, vomiting, blurry vision, cp sob, or abd pain,    /76, 72, 18, 100 NC  98.6    Gen Patient lying in bed holding compress to nose with ice pack  Head AT/NC  Eyes clear conjunctiva, PERRLA EOMI  ENMT rt nose actively bleeding with clots , small drip to left nostril. tongue midline   Lungs CTA B/L no rhonchi , no wheezing, no rales  CV s1 s2 irreg HR , mechanical click  Abd soft nontender nondistended BS in all 4 quadrants  Ext no calf tenderness +  Lower leg edema b/l   Neuro Alert, awake and oriented x3 movement of all extremities , sensory intact.    a/p  70 year old Female with history of CAD, mechanical MVR andTVR, status post atrial fibrillation ablation back in atrial fibrillation at times rapid ventricular response, COPD, MIGUEL, hypertension, dyslipidemia, gout, chronic kidney disease presented with chest pain dizziness and shortness of breath evidence of tachybradycardia times, adjustments of medications with improvement in atrial fibrillation status. She also had epistaxis requiring packing and red blood cell support.  Patient now again with epistaxis ,   5.5 rhino rocket inserted in Rt nostril without incident. Patient tolerated procedure well.  Heparin drip stopped as per order if bleeding occurs.  Will obtain stat cbc  will continue to monitor the patient  Dr Alonzo made aware   [facility]    Addendum  CBC              8.6    8.52  )-----------( 389      ( 05 Jan 2021 21:57 )                30.4   will restart the heparin drip at 12  and watch for bleeding.  discussed with Dr Cheri Cooley [facility]

## 2021-01-05 NOTE — DISCHARGE NOTE PROVIDER - HOSPITAL COURSE
Assessment and Plan:   · Assessment     70 years      h/o   CAD,  mechanical MVR and Tricuspid valve repair,        h/o  chronic   HFpEF,   HTN  . IMGUEL ,     severe MR  ,  s/p mechanical MVR 1999 , [facility] with Dr. Anderson,  and severe TR s/p TVR 2012 ([facility]),       COPD ,on home 02; no prior tobacco use      AFib on Coumadin , failed  ablation,   ,  PHTN,   CKD 3 (b/l SCr ~1.3-1.6),  MIGUEL and gout.     h/o   hypercarbia and she reports  needing Bipap,in the past,  but hasn't used in 10 years.      Morbid  obesity,  BMI is 41     presented with  CP,  dizziness and sob     She also had epistaxis requiring packed red blood cell support.     tele, Afib  mostly in 60's to  70's, briefly  35, while  asleep     1.,  Atrial fibrillation    on Lasix  bid,   metoprolol ER 25 mg  / lipitor  card  dr barreto   2.  MVR  mechanical,1999  Continue IV heparin and warfarin with goal INR of 2.5-3.5 with mechanical MVR   daily INR   3.  TVR, 2012 at [facility]   4.  HTN, on Toprol   5. Dizziness , since resolved     spoke  with neuro dr brenner, no further   w/p is needed  Ct head , with infarcts, Right b. ganglia and Right cerebellum   6. .  Anemia, hb is  7.9   c/c  anemia, , baseline is  8  to 9/   last colonoscopy  was  10 yrs  ago/ s/p melena last yr. and, no w/p was done.,as  pt was  deemed high risk  Gi eval/ dr elizondo,   stool guaic/ iron studies/Ct A/P  pt  at high risk for  any procedure  7.  Acute  Diastolic  Chf    on iv lasix 60  mg,   bid /       daily weights/ follow   I/O//   PT  eval  8.  Severe Pulm Htn, on echo  Echo  12/20,normal  ef/mod  MS/  severe Pulm Htn     Hb is 7.6/28,  and  INR is 2.2/  coumadin  dosed  spoke with  card /DR orellana    pt to be  transferred to  [place]   for ELIZABETH/ ? fistula  from aorta  to RV/   based  on review  of prior  echo       < from: CT Head No Cont (12.29.20 @ 20:41)   IMPRESSION: No acute intracranial hemorrhage. Age-indeterminate right basal ganglia lacunar infarct and right cerebellar lacunar infarct. If there is clinical suspicion for acute infarct, consider brain MRI if there is no contraindication.  < end of copied text >      < from: TTE Echo Complete w/ Contrast w/ Doppler (12.31.20 @ 14:01) >  ummary:   1. Left ventricular ejection fraction, by visual estimation, is 60 to 65%.   2. Technically suboptimal study.   3. Normal global left ventricular systolic function.   4. Normal left ventricular internal cavity size.   5. Spectral Doppler shows pseudonormal pattern of left ventricular myocardial filling (Grade II diastolic dysfunction).   6. Normal right ventricular size and function.   7. Mild to moderately enlarged left atrium.   8. There is no evidence of pericardial effusion.   9. Mild mitral annular calcification.  10. Mild mitral valve regurgitation.  11. Mild thickening and calcification of the anterior and posterior mitral valve leaflets.  12. Peak transmitral mean gradient equals 6.8 mmHg, calculated mitral valve area by pressure half time equals 1.86 cm² consistent withmild mitral stenosis.  13. Mild tricuspid regurgitation.  14. Mild aortic regurgitation.  15. Sclerotic aortic valve with normal opening.  16. Estimated pulmonary artery systolic pressure is 68.4 mmHg assuming a right atrial pressure of 8 mmHg, whichis consistent with severe pulmonary hypertension.  17. C/w the study of 6-21-20, more significant pulmonary htn is now noted.  18. Endocardial visualization was enhanced with intravenous echo contrast.  19. Mitral valve mean gradient is 6.8 mmHg consistent with moderate mitral stenosis.  [place]ana Barreto MD FACC, FASE, FAC  < end of copied text >

## 2021-01-05 NOTE — PROGRESS NOTE ADULT - SUBJECTIVE AND OBJECTIVE BOX
Patient is a 70y old  Female who presents with a chief complaint of chest pain (05 Jan 2021 10:22)    PAST MEDICAL & SURGICAL HISTORY:  COPD (chronic obstructive pulmonary disease)    Hypertension    CHF (congestive heart failure)    Tricuspid valve replaced    Mitral valve replaced    Gout    CHF (congestive heart failure)    COPD (chronic obstructive pulmonary disease)  on home O2    MIGUEL (obstructive sleep apnea)    Pulmonary hypertension    Atrial fibrillation  S/P Ablation    INTERVAL HISTORY: Resting in bed, in no acute distress   	  MEDICATIONS:  MEDICATIONS  (STANDING):  allopurinol 100 milliGRAM(s) Oral daily  atorvastatin 40 milliGRAM(s) Oral at bedtime  budesonide 160 MICROgram(s)/formoterol 4.5 MICROgram(s) Inhaler 2 Puff(s) Inhalation two times a day  furosemide   Injectable 40 milliGRAM(s) IV Push two times a day  heparin  Infusion.  Unit(s)/Hr (19 mL/Hr) IV Continuous <Continuous>  influenza   Vaccine 0.5 milliLiter(s) IntraMuscular once  metoprolol succinate ER 25 milliGRAM(s) Oral daily    MEDICATIONS  (PRN):  heparin   Injectable 9000 Unit(s) IV Push every 6 hours PRN For aPTT less than 40  heparin   Injectable 4000 Unit(s) IV Push every 6 hours PRN For aPTT between 40 - 57  sodium chloride 0.65% Nasal 1 Spray(s) Both Nostrils every 6 hours PRN Nasal Congestion    Vitals:  T(F): 98.4 (01-05-21 @ 05:25), Max: 99 (01-05-21 @ 00:38)  HR: 70 (01-05-21 @ 05:51) (68 - 77)  BP: 124/80 (01-05-21 @ 05:51) (103/53 - 158/65)  RR: 18 (01-05-21 @ 05:25) (18 - 18)  SpO2: 100% (01-05-21 @ 05:25) (95% - 100%)  Wt(kg): -- 109.4 kg    01-04 @ 07:01  -  01-05 @ 07:00  --------------------------------------------------------  IN:    Oral Fluid: 720 mL  Total IN: 720 mL    OUT:    Voided (mL): 500 mL  Total OUT: 500 mL    Total NET: 220 mL    PHYSICAL EXAM:  Neuro: Awake, responsive  CV: S1 S2 irregular   Lungs: CTABL  GI: Soft, BS +, ND, NT  Extremities: + LE edema    TELEMETRY: atrial fibrillation, HR in high 30s at times while sleeping     RADIOLOGY: < from: Xray Chest 1 View- PORTABLE-Urgent (Xray Chest 1 View- PORTABLE-Urgent .) (12.29.20 @ 16:57) >    Heart enlargement, prominent aorta, sternotomy, and prosthetic heart valve again noted.    There is a mild central congestive picture somewhat increased from September 18 of this year.    IMPRESSION: Heart enlargement, heart surgery, and mild central CHF.    < end of copied text >    DIAGNOSTIC TESTING:    [x ] Echocardiogram:   < from: TTE Echo Complete w/ Contrast w/ Doppler (12.31.20 @ 14:01) >  Left Ventricle: Endocardial visualization was enhanced with intravenous echo contrast. The left ventricular internal cavity size is normal.  Global LV systolic function was normal. Left ventricular ejection fraction, by visual estimation, is 60 to 65%. Spectral Doppler shows pseudonormal pattern of left ventricular myocardial filling (Grade II diastolic dysfunction).  Right Ventricle: Normal right ventricular size and function.  Left Atrium: Mild to moderately enlarged left atrium.  Right Atrium: The right atrium was not well visualized.  Pericardium: There is no evidence ofpericardial effusion.  Mitral Valve: The mitral valve is not well seen. Mild thickening and calcification of the anterior and posterior mitral valve leaflets. Mitral leaflet mobility is normal. There is mild mitral annular calcification. Peak transmitral mean gradient equals 6.8 mmHg, calculated mitral valve area by pressure half time equals 1.86 cm² consistent with mild mitral stenosis. Mitral valve mean gradient is 6.8 mmHg consistent with moderate mitral stenosis. Mild mitral valve regurgitation is seen.  Tricuspid Valve: The tricuspid valve is not well seen. The tricuspid valve is normal in structure. Mild tricuspid regurgitation is visualized. Estimated pulmonary artery systolic pressure is 68.4 mmHg assuming a right atrial pressure of 8mmHg, which is consistent with severe pulmonary hypertension.  Aortic Valve: The aortic valve is trileaflet. Sclerotic aortic valve with normal opening. Mild aortic valve regurgitation is seen.  Pulmonic Valve: The pulmonic valve was not well visualized. Trace pulmonic valve regurgitation.  Aorta: Aortic root measured at Sinus of Valsalva is normal.  Venous: The inferior vena cava was normal sized, with respiratory size variation less than 50%.      Summary:   1. Left ventricular ejection fraction, by visual estimation, is 60 to 65%.   2. Technically suboptimal study.   3. Normal global left ventricular systolic function.   4. Normal left ventricular internal cavity size.   5. Spectral Doppler shows pseudonormal pattern of left ventricular myocardial filling (Grade II diastolic dysfunction).   6. Normal right ventricular size and function.   7. Mild to moderately enlarged left atrium.   8. There is no evidence of pericardial effusion.   9. Mild mitral annular calcification.  10. Mild mitral valve regurgitation.  11. Mild thickening and calcification of the anterior and posterior mitral valve leaflets.  12. Peak transmitral mean gradient equals 6.8 mmHg, calculated mitral valve area by pressure half time equals 1.86 cm² consistent withmild mitral stenosis.  13. Mild tricuspid regurgitation.  14. Mild aortic regurgitation.  15. Sclerotic aortic valve with normal opening.  16. Estimated pulmonary artery systolic pressure is 68.4 mmHg assuming a right atrial pressure of 8 mmHg, whichis consistent with severe pulmonary hypertension.  17. C/w the study of 6-21-20, more significant pulmonary htn is now noted.  18. Endocardial visualization was enhanced with intravenous echo contrast.  19. Mitral valve mean gradient is 6.8 mmHg consistent with moderate mitral stenosis.    < end of copied text >    04 Jan 2021 06:41    137    |  98     |  56     ----------------------------<  129    4.3     |  34     |  1.66   03 Jan 2021 07:23    137    |  99     |  51     ----------------------------<  99     4.4     |  32     |  1.47     Ca    9.0        04 Jan 2021 06:41                        7.9    8.60  )-----------( 361      ( 04 Jan 2021 06:41 )             28.3 ,                       8.3    9.83  )-----------( 394      ( 03 Jan 2021 07:23 )             29.5     TSH: Thyroid Stimulating Hormone, Serum: 1.630 uIU/mL (01-02 @ 06:57)  INR: 2.00 ratio (01-04 @ 06:41)  INR: 1.77 ratio (01-03 @ 07:23)           Patient is a 70y old  Female who presents with a chief complaint of chest pain (05 Jan 2021 10:22)    PAST MEDICAL & SURGICAL HISTORY:  COPD (chronic obstructive pulmonary disease)    Hypertension    CHF (congestive heart failure)    Tricuspid valve replaced    Mitral valve replaced    Gout    CHF (congestive heart failure)    COPD (chronic obstructive pulmonary disease)  on home O2    MIGUEL (obstructive sleep apnea), no longer using BiPAP    Pulmonary hypertension    Atrial fibrillation  S/P Ablation    INTERVAL HISTORY: Resting in bed, in no acute distress, increaased SOB at times  	  MEDICATIONS:  MEDICATIONS  (STANDING):  allopurinol 100 milliGRAM(s) Oral daily  atorvastatin 40 milliGRAM(s) Oral at bedtime  budesonide 160 MICROgram(s)/formoterol 4.5 MICROgram(s) Inhaler 2 Puff(s) Inhalation two times a day  furosemide   Injectable 40 milliGRAM(s) IV Push two times a day  heparin  Infusion.  Unit(s)/Hr (19 mL/Hr) IV Continuous <Continuous>  influenza   Vaccine 0.5 milliLiter(s) IntraMuscular once  metoprolol succinate ER 25 milliGRAM(s) Oral daily    MEDICATIONS  (PRN):  heparin   Injectable 9000 Unit(s) IV Push every 6 hours PRN For aPTT less than 40  heparin   Injectable 4000 Unit(s) IV Push every 6 hours PRN For aPTT between 40 - 57  sodium chloride 0.65% Nasal 1 Spray(s) Both Nostrils every 6 hours PRN Nasal Congestion    Vitals:  T(F): 98.4 (01-05-21 @ 05:25), Max: 99 (01-05-21 @ 00:38)  HR: 70 (01-05-21 @ 05:51) (68 - 77)  BP: 124/80 (01-05-21 @ 05:51) (103/53 - 158/65)  RR: 18 (01-05-21 @ 05:25) (18 - 18)  SpO2: 100% (01-05-21 @ 05:25) (95% - 100%)  Wt(kg): -- 109.4 kg    01-04 @ 07:01  -  01-05 @ 07:00  --------------------------------------------------------  IN:    Oral Fluid: 720 mL  Total IN: 720 mL    OUT:    Voided (mL): 500 mL  Total OUT: 500 mL    Total NET: 220 mL    PHYSICAL EXAM:  Neuro: Awake, responsive  CV: S1 S2 irregular + mechanical click   Lungs: few rales to bases  GI: Soft, BS +, ND, NT  Extremities: + LE edema    TELEMETRY: atrial fibrillation, HR in high 30s at times while sleeping     RADIOLOGY: < from: Xray Chest 1 View- PORTABLE-Urgent (Xray Chest 1 View- PORTABLE-Urgent .) (12.29.20 @ 16:57) >    Heart enlargement, prominent aorta, sternotomy, and prosthetic heart valve again noted.    There is a mild central congestive picture somewhat increased from September 18 of this year.    IMPRESSION: Heart enlargement, heart surgery, and mild central CHF.    < end of copied text >    DIAGNOSTIC TESTING:    [x ] Echocardiogram:   < from: TTE Echo Complete w/ Contrast w/ Doppler (12.31.20 @ 14:01) >  Left Ventricle: Endocardial visualization was enhanced with intravenous echo contrast. The left ventricular internal cavity size is normal.  Global LV systolic function was normal. Left ventricular ejection fraction, by visual estimation, is 60 to 65%. Spectral Doppler shows pseudonormal pattern of left ventricular myocardial filling (Grade II diastolic dysfunction).  Right Ventricle: Normal right ventricular size and function.  Left Atrium: Mild to moderately enlarged left atrium.  Right Atrium: The right atrium was not well visualized.  Pericardium: There is no evidence ofpericardial effusion.  Mitral Valve: The mitral valve is not well seen. Mild thickening and calcification of the anterior and posterior mitral valve leaflets. Mitral leaflet mobility is normal. There is mild mitral annular calcification. Peak transmitral mean gradient equals 6.8 mmHg, calculated mitral valve area by pressure half time equals 1.86 cm² consistent with mild mitral stenosis. Mitral valve mean gradient is 6.8 mmHg consistent with moderate mitral stenosis. Mild mitral valve regurgitation is seen.  Tricuspid Valve: The tricuspid valve is not well seen. The tricuspid valve is normal in structure. Mild tricuspid regurgitation is visualized. Estimated pulmonary artery systolic pressure is 68.4 mmHg assuming a right atrial pressure of 8mmHg, which is consistent with severe pulmonary hypertension.  Aortic Valve: The aortic valve is trileaflet. Sclerotic aortic valve with normal opening. Mild aortic valve regurgitation is seen.  Pulmonic Valve: The pulmonic valve was not well visualized. Trace pulmonic valve regurgitation.  Aorta: Aortic root measured at Sinus of Valsalva is normal.  Venous: The inferior vena cava was normal sized, with respiratory size variation less than 50%.      Summary:   1. Left ventricular ejection fraction, by visual estimation, is 60 to 65%.   2. Technically suboptimal study.   3. Normal global left ventricular systolic function.   4. Normal left ventricular internal cavity size.   5. Spectral Doppler shows pseudonormal pattern of left ventricular myocardial filling (Grade II diastolic dysfunction).   6. Normal right ventricular size and function.   7. Mild to moderately enlarged left atrium.   8. There is no evidence of pericardial effusion.   9. Mild mitral annular calcification.  10. Mild mitral valve regurgitation.  11. Mild thickening and calcification of the anterior and posterior mitral valve leaflets.  12. Peak transmitral mean gradient equals 6.8 mmHg, calculated mitral valve area by pressure half time equals 1.86 cm² consistent withmild mitral stenosis.  13. Mild tricuspid regurgitation.  14. Mild aortic regurgitation.  15. Sclerotic aortic valve with normal opening.  16. Estimated pulmonary artery systolic pressure is 68.4 mmHg assuming a right atrial pressure of 8 mmHg, whichis consistent with severe pulmonary hypertension.  17. C/w the study of 6-21-20, more significant pulmonary htn is now noted.  18. Endocardial visualization was enhanced with intravenous echo contrast.  19. Mitral valve mean gradient is 6.8 mmHg consistent with moderate mitral stenosis.    < end of copied text >    04 Jan 2021 06:41    137    |  98     |  56     ----------------------------<  129    4.3     |  34     |  1.66   03 Jan 2021 07:23    137    |  99     |  51     ----------------------------<  99     4.4     |  32     |  1.47     Ca    9.0        04 Jan 2021 06:41                        7.9    8.60  )-----------( 361      ( 04 Jan 2021 06:41 )             28.3 ,                       8.3    9.83  )-----------( 394      ( 03 Jan 2021 07:23 )             29.5     TSH: Thyroid Stimulating Hormone, Serum: 1.630 uIU/mL (01-02 @ 06:57)  INR: 2.00 ratio (01-04 @ 06:41)  INR: 1.77 ratio (01-03 @ 07:23)

## 2021-01-05 NOTE — PROGRESS NOTE ADULT - ASSESSMENT
70 years      h/o   CAD,  mechanical MVR and Tricuspid valve repair,      s/p  AFIB ablation,  which was  unsuccessful.      now in   atrial fibrillation.   COPD on 3 L  home  O2, ,HTN.  HLD,  gout, CKD  Morbid  obesity,  BMI is 41     presented with  CP,  dizziness and sob  with  evidence of tachybradycardia   sx   She also had epistaxis requiring packed red blood cell support.     telemetry monitoring   1.,  Atrial fibrillation    on Lasix 40 mg  bid,   metoprolol ER 25 mg  / lipitor  card  dr barreto   2.  MVR  mechanical  Continue IV heparin and warfarin with goal INR of 2.5-3.5 with mechanical MVR   daily INR   3.  HTN, on Toprol   4. Dizziness , per neuro dr brenner  Ct head , with infarcts, Right b. ganglia and Right cerebellum   5. Anemia, hb is  7.9   c/c  anemia, , baseline is  8  to 9  Gi eval/ dr elizondo,   stool guaic/ iron studies  6.  Acute  Diastolic  Chf.  gained  1  kg/  extra lasix  given  on po lasix  at home  7. Severe Pulm Htn, on echo  Echo  12/20,normal  ef/mod  MS/  severe Pulm Htn    labs pending/  follow  hb and  INR       < from: CT Head No Cont (12.29.20 @ 20:41)   IMPRESSION: No acute intracranial hemorrhage. Age-indeterminate right basal ganglia lacunar infarct and right cerebellar lacunar infarct. If there is clinical suspicion for acute infarct, consider brain MRI if there is no contraindication.  < end of copied text >      < from: TTE Echo Complete w/ Contrast w/ Doppler (12.31.20 @ 14:01) >  ummary:   1. Left ventricular ejection fraction, by visual estimation, is 60 to 65%.   2. Technically suboptimal study.   3. Normal global left ventricular systolic function.   4. Normal left ventricular internal cavity size.   5. Spectral Doppler shows pseudonormal pattern of left ventricular myocardial filling (Grade II diastolic dysfunction).   6. Normal right ventricular size and function.   7. Mild to moderately enlarged left atrium.   8. There is no evidence of pericardial effusion.   9. Mild mitral annular calcification.  10. Mild mitral valve regurgitation.  11. Mild thickening and calcification of the anterior and posterior mitral valve leaflets.  12. Peak transmitral mean gradient equals 6.8 mmHg, calculated mitral valve area by pressure half time equals 1.86 cm² consistent withmild mitral stenosis.  13. Mild tricuspid regurgitation.  14. Mild aortic regurgitation.  15. Sclerotic aortic valve with normal opening.  16. Estimated pulmonary artery systolic pressure is 68.4 mmHg assuming a right atrial pressure of 8 mmHg, whichis consistent with severe pulmonary hypertension.  17. C/w the study of 6-21-20, more significant pulmonary htn is now noted.  18. Endocardial visualization was enhanced with intravenous echo contrast.  19. Mitral valve mean gradient is 6.8 mmHg consistent with moderate mitral stenosis.  Montana Barreto MD FACC, ANGELA, FAC  < end of copied text >                   70 years      h/o   CAD,  mechanical MVR and Tricuspid valve repair,      s/p  AFIB ablation,  which was  unsuccessful.      now in   atrial fibrillation.   COPD on 3 L  home  O2, ,HTN.  HLD,  gout, CKD  Morbid  obesity,  BMI is 41     presented with  CP,  dizziness and sob  with  evidence of tachybradycardia   sx   She also had epistaxis requiring packed red blood cell support.     telemetry monitoring   1.,  Atrial fibrillation    on Lasix 40 mg  bid,   metoprolol ER 25 mg  / lipitor  card  dr barreto   2.  MVR  mechanical  Continue IV heparin and warfarin with goal INR of 2.5-3.5 with mechanical MVR   daily INR   3.  HTN, on Toprol   4. Dizziness , per neuro dr brenner  Ct head , with infarcts, Right b. ganglia and Right cerebellum   5. Anemia, hb is  7.9   c/c  anemia, , baseline is  8  to 9  Gi eval/ dr elizondo,   stool guaic/ iron studies/Ct A/P  6.  Acute  Diastolic  Chf.  gained  1  kg/  extra lasix  given  on po lasix  at home  7. Severe Pulm Htn, on echo  Echo  12/20,normal  ef/mod  MS/  severe Pulm Htn     Hb is 7.6/28,  and  INR is 2.2/  coumadin  dosed   pt does not  want  any intervention/ test done at present for  the  c/c  anemia   hopefully,   d/c home in am, , pending   AM,  INR and CT a/p  prepare  for   d/c in am       < from: CT Head No Cont (12.29.20 @ 20:41)   IMPRESSION: No acute intracranial hemorrhage. Age-indeterminate right basal ganglia lacunar infarct and right cerebellar lacunar infarct. If there is clinical suspicion for acute infarct, consider brain MRI if there is no contraindication.  < end of copied text >      < from: TTE Echo Complete w/ Contrast w/ Doppler (12.31.20 @ 14:01) >  ummary:   1. Left ventricular ejection fraction, by visual estimation, is 60 to 65%.   2. Technically suboptimal study.   3. Normal global left ventricular systolic function.   4. Normal left ventricular internal cavity size.   5. Spectral Doppler shows pseudonormal pattern of left ventricular myocardial filling (Grade II diastolic dysfunction).   6. Normal right ventricular size and function.   7. Mild to moderately enlarged left atrium.   8. There is no evidence of pericardial effusion.   9. Mild mitral annular calcification.  10. Mild mitral valve regurgitation.  11. Mild thickening and calcification of the anterior and posterior mitral valve leaflets.  12. Peak transmitral mean gradient equals 6.8 mmHg, calculated mitral valve area by pressure half time equals 1.86 cm² consistent withmild mitral stenosis.  13. Mild tricuspid regurgitation.  14. Mild aortic regurgitation.  15. Sclerotic aortic valve with normal opening.  16. Estimated pulmonary artery systolic pressure is 68.4 mmHg assuming a right atrial pressure of 8 mmHg, whichis consistent with severe pulmonary hypertension.  17. C/w the study of 6-21-20, more significant pulmonary htn is now noted.  18. Endocardial visualization was enhanced with intravenous echo contrast.  19. Mitral valve mean gradient is 6.8 mmHg consistent with moderate mitral stenosis.  Montana Barreto MD FACC, ANGELA, FAC  < end of copied text >                   70 years      h/o   CAD,  mechanical MVR and Tricuspid valve repair,      s/p  AFIB ablation,  which was  unsuccessful.      now in   atrial fibrillation.   COPD on 3 L  home  O2, ,HTN.  HLD,  gout, CKD  Morbid  obesity,  BMI is 41     presented with  CP,  dizziness and sob  with  evidence of tachybradycardia   sx   She also had epistaxis requiring packed red blood cell support.     tele, Afibmostly in 60's to  70's   1.,  Atrial fibrillation    on Lasix 40 mg  bid,   metoprolol ER 25 mg  / lipitor  card  dr barreto   2.  MVR  mechanical  Continue IV heparin and warfarin with goal INR of 2.5-3.5 with mechanical MVR   daily INR   3.  HTN, on Toprol   4. Dizziness , per neuro dr brenner  Ct head , with infarcts, Right b. ganglia and Right cerebellum   5. Anemia, hb is  7.9   c/c  anemia, , baseline is  8  to 9/   last colonoscopy  was  10 yrs  ago/ s/p melena last yr. no w/p done  Gi eval/ dr elizondo,   stool guaic/ iron studies/Ct A/P  6.  Acute  Diastolic  Chf.  gained  1  kg/  on iv lasix/ pt has  no sob now  on po lasix  at home  7. Severe Pulm Htn, on echo  Echo  12/20,normal  ef/mod  MS/  severe Pulm Htn     Hb is 7.6/28,  and  INR is 2.2/  coumadin  dosed   pt does not  want  any intervention/ test done at present for  the  c/c  anemia   hopefully,   d/c home in am, , pending   AM,  INR and CT a/p  prepare  for   d/c in am       < from: CT Head No Cont (12.29.20 @ 20:41)   IMPRESSION: No acute intracranial hemorrhage. Age-indeterminate right basal ganglia lacunar infarct and right cerebellar lacunar infarct. If there is clinical suspicion for acute infarct, consider brain MRI if there is no contraindication.  < end of copied text >      < from: TTE Echo Complete w/ Contrast w/ Doppler (12.31.20 @ 14:01) >  ummary:   1. Left ventricular ejection fraction, by visual estimation, is 60 to 65%.   2. Technically suboptimal study.   3. Normal global left ventricular systolic function.   4. Normal left ventricular internal cavity size.   5. Spectral Doppler shows pseudonormal pattern of left ventricular myocardial filling (Grade II diastolic dysfunction).   6. Normal right ventricular size and function.   7. Mild to moderately enlarged left atrium.   8. There is no evidence of pericardial effusion.   9. Mild mitral annular calcification.  10. Mild mitral valve regurgitation.  11. Mild thickening and calcification of the anterior and posterior mitral valve leaflets.  12. Peak transmitral mean gradient equals 6.8 mmHg, calculated mitral valve area by pressure half time equals 1.86 cm² consistent withmild mitral stenosis.  13. Mild tricuspid regurgitation.  14. Mild aortic regurgitation.  15. Sclerotic aortic valve with normal opening.  16. Estimated pulmonary artery systolic pressure is 68.4 mmHg assuming a right atrial pressure of 8 mmHg, whichis consistent with severe pulmonary hypertension.  17. C/w the study of 6-21-20, more significant pulmonary htn is now noted.  18. Endocardial visualization was enhanced with intravenous echo contrast.  19. Mitral valve mean gradient is 6.8 mmHg consistent with moderate mitral stenosis.  Montana Barreto MD FACC, ANGELA, FAC  < end of copied text >                   70 years      h/o   CAD,  mechanical MVR and Tricuspid valve repair,      s/p  AFIB ablation,  which was  unsuccessful.      now in   atrial fibrillation.   COPD on 3 L  home  O2, ,HTN.  HLD,  gout, CKD  Morbid  obesity,  BMI is 41     presented with  CP,  dizziness and sob  with  evidence of tachybradycardia   sx   She also had epistaxis requiring packed red blood cell support.     tele, Afibmostly in 60's to  70's   1.,  Atrial fibrillation    on Lasix 40 mg  bid,   metoprolol ER 25 mg  / lipitor  card  dr barreto   2.  MVR  mechanical  Continue IV heparin and warfarin with goal INR of 2.5-3.5 with mechanical MVR   daily INR   3.  HTN, on Toprol   4. Dizziness , since resolved     spoke  with neuro dr brenner, no further    w/p is needed  Ct head , with infarcts, Right b. ganglia and Right cerebellum   5. Anemia, hb is  7.9   c/c  anemia, , baseline is  8  to 9/   last colonoscopy  was  10 yrs  ago/ s/p melena last yr. and, no w/p was done  Gi eval/ dr elizondo,   stool guaic/ iron studies/Ct A/P  6.  Acute  Diastolic  Chf    on iv lasix  bid , pt has  no sob now/  seen by card  on po lasix  at home  7. Severe Pulm Htn, on echo  Echo  12/20,normal  ef/mod  MS/  severe Pulm Htn     Hb is 7.6/28,  and  INR is 2.2/  coumadin  dosed   pt does not  want  any intervention/ test done at present for  the  c/c  anemia    pt wishes  to go home in am    hopefully,   d/c home in am, , pending   AM,  INR and CT a/p  prepare  for   d/c in am, pending stated results       < from: CT Head No Cont (12.29.20 @ 20:41)   IMPRESSION: No acute intracranial hemorrhage. Age-indeterminate right basal ganglia lacunar infarct and right cerebellar lacunar infarct. If there is clinical suspicion for acute infarct, consider brain MRI if there is no contraindication.  < end of copied text >      < from: TTE Echo Complete w/ Contrast w/ Doppler (12.31.20 @ 14:01) >  ummary:   1. Left ventricular ejection fraction, by visual estimation, is 60 to 65%.   2. Technically suboptimal study.   3. Normal global left ventricular systolic function.   4. Normal left ventricular internal cavity size.   5. Spectral Doppler shows pseudonormal pattern of left ventricular myocardial filling (Grade II diastolic dysfunction).   6. Normal right ventricular size and function.   7. Mild to moderately enlarged left atrium.   8. There is no evidence of pericardial effusion.   9. Mild mitral annular calcification.  10. Mild mitral valve regurgitation.  11. Mild thickening and calcification of the anterior and posterior mitral valve leaflets.  12. Peak transmitral mean gradient equals 6.8 mmHg, calculated mitral valve area by pressure half time equals 1.86 cm² consistent withmild mitral stenosis.  13. Mild tricuspid regurgitation.  14. Mild aortic regurgitation.  15. Sclerotic aortic valve with normal opening.  16. Estimated pulmonary artery systolic pressure is 68.4 mmHg assuming a right atrial pressure of 8 mmHg, whichis consistent with severe pulmonary hypertension.  17. C/w the study of 6-21-20, more significant pulmonary htn is now noted.  18. Endocardial visualization was enhanced with intravenous echo contrast.  19. Mitral valve mean gradient is 6.8 mmHg consistent with moderate mitral stenosis.  Montana Barreto MD FACC, ANGELA, FAC  < end of copied text >                   70 years      h/o   CAD,  mechanical MVR and Tricuspid valve repair,      s/p  AFIB ablation,  which was  unsuccessful.      now in   atrial fibrillation.   COPD on 3 L  home  O2, ,HTN.  HLD,  gout, CKD  Morbid  obesity,  BMI is 41     presented with  CP,  dizziness and sob  with  evidence of tachybradycardia   sx   She also had epistaxis requiring packed red blood cell support.     tele, Afibmostly in 60's to  70's   1.,  Atrial fibrillation    on Lasix 40 mg  bid,   metoprolol ER 25 mg  / lipitor  card  dr barreto   2.  MVR  mechanical  Continue IV heparin and warfarin with goal INR of 2.5-3.5 with mechanical MVR   daily INR   3.  HTN, on Toprol   4. Dizziness , since resolved     spoke  with neuro dr brenner, no further    w/p is needed  Ct head , with infarcts, Right b. ganglia and Right cerebellum   5. Anemia, hb is  7.9   c/c  anemia, , baseline is  8  to 9/   last colonoscopy  was  10 yrs  ago/ s/p melena last yr. and, no w/p was done  Gi eval/ dr elizondo,   stool guaic/ iron studies/Ct A/P  6.  Acute  Diastolic  Chf    on iv lasix 60  mg,   bid , pt has  no sob now/  seen by card  on po lasix  at home  7. Severe Pulm Htn, on echo  Echo  12/20,normal  ef/mod  MS/  severe Pulm Htn     Hb is 7.6/28,  and  INR is 2.2/  coumadin  dosed   pt does not  want  any intervention/ test done at present for  the  c/c  anemia    pt wishes  to go home in am    hopefully,   d/c home in am, , pending   AM,  INR and CT a/p  prepare  for   d/c in am, pending stated results       < from: CT Head No Cont (12.29.20 @ 20:41)   IMPRESSION: No acute intracranial hemorrhage. Age-indeterminate right basal ganglia lacunar infarct and right cerebellar lacunar infarct. If there is clinical suspicion for acute infarct, consider brain MRI if there is no contraindication.  < end of copied text >      < from: TTE Echo Complete w/ Contrast w/ Doppler (12.31.20 @ 14:01) >  ummary:   1. Left ventricular ejection fraction, by visual estimation, is 60 to 65%.   2. Technically suboptimal study.   3. Normal global left ventricular systolic function.   4. Normal left ventricular internal cavity size.   5. Spectral Doppler shows pseudonormal pattern of left ventricular myocardial filling (Grade II diastolic dysfunction).   6. Normal right ventricular size and function.   7. Mild to moderately enlarged left atrium.   8. There is no evidence of pericardial effusion.   9. Mild mitral annular calcification.  10. Mild mitral valve regurgitation.  11. Mild thickening and calcification of the anterior and posterior mitral valve leaflets.  12. Peak transmitral mean gradient equals 6.8 mmHg, calculated mitral valve area by pressure half time equals 1.86 cm² consistent withmild mitral stenosis.  13. Mild tricuspid regurgitation.  14. Mild aortic regurgitation.  15. Sclerotic aortic valve with normal opening.  16. Estimated pulmonary artery systolic pressure is 68.4 mmHg assuming a right atrial pressure of 8 mmHg, whichis consistent with severe pulmonary hypertension.  17. C/w the study of 6-21-20, more significant pulmonary htn is now noted.  18. Endocardial visualization was enhanced with intravenous echo contrast.  19. Mitral valve mean gradient is 6.8 mmHg consistent with moderate mitral stenosis.  Montana Barreto MD FACC, ANGELA, FAC  < end of copied text >                   70 years      h/o   CAD,  mechanical MVR and Tricuspid valve repair,        h/o  chronic   HFpEF,   HTN  . MIGUEL ,     severe MR  ,  s/p mechanical MVR 1999 , [facility] with Dr. Anderson,  and severe TR s/p TVR 2012 ([facility]),       COPD ,on home 02; no prior tobacco use      AFib on Coumadin , failed  ablation,   ,  PHTN,   CKD 3 (b/l SCr ~1.3-1.6),  MIGUEL and gout.     h/o   hypercarbia and she reports  needing Bipap,in the past,  but hasn't used in 10 years.      Morbid  obesity,  BMI is 41     presented with  CP,  dizziness and sob     She also had epistaxis requiring packed red blood cell support.     tele, Afib  mostly in 60's to  70's, briefly  35, while  asleep     1.,  Atrial fibrillation    on Lasix  bid,   metoprolol ER 25 mg  / lipitor  card  dr barreto   2.  MVR  mechanical,1999  Continue IV heparin and warfarin with goal INR of 2.5-3.5 with mechanical MVR   daily INR   3.  TVR, 2012 at [facility]   4.  HTN, on Toprol   5. Dizziness , since resolved     spoke  with neuro dr brenner, no further    w/p is needed  Ct head , with infarcts, Right b. ganglia and Right cerebellum   6. .  Anemia, hb is  7.9   c/c  anemia, , baseline is  8  to 9/   last colonoscopy  was  10 yrs  ago/ s/p melena last yr. and, no w/p was done  Gi eval/ dr elizondo,   stool guaic/ iron studies/Ct A/P  7.  Acute  Diastolic  Chf    on iv lasix 60  mg,   bid , pt has  no sob now/  seen by card  on po lasix  at home  8.  Severe Pulm Htn, on echo  Echo  12/20,normal  ef/mod  MS/  severe Pulm Htn     Hb is 7.6/28,  and  INR is 2.2/  coumadin  dosed   pt does not  want  any intervention/ test done at present for  the  c/c  anemia    CT a/p, pending       < from: CT Head No Cont (12.29.20 @ 20:41)   IMPRESSION: No acute intracranial hemorrhage. Age-indeterminate right basal ganglia lacunar infarct and right cerebellar lacunar infarct. If there is clinical suspicion for acute infarct, consider brain MRI if there is no contraindication.  < end of copied text >      < from: TTE Echo Complete w/ Contrast w/ Doppler (12.31.20 @ 14:01) >  ummary:   1. Left ventricular ejection fraction, by visual estimation, is 60 to 65%.   2. Technically suboptimal study.   3. Normal global left ventricular systolic function.   4. Normal left ventricular internal cavity size.   5. Spectral Doppler shows pseudonormal pattern of left ventricular myocardial filling (Grade II diastolic dysfunction).   6. Normal right ventricular size and function.   7. Mild to moderately enlarged left atrium.   8. There is no evidence of pericardial effusion.   9. Mild mitral annular calcification.  10. Mild mitral valve regurgitation.  11. Mild thickening and calcification of the anterior and posterior mitral valve leaflets.  12. Peak transmitral mean gradient equals 6.8 mmHg, calculated mitral valve area by pressure half time equals 1.86 cm² consistent withmild mitral stenosis.  13. Mild tricuspid regurgitation.  14. Mild aortic regurgitation.  15. Sclerotic aortic valve with normal opening.  16. Estimated pulmonary artery systolic pressure is 68.4 mmHg assuming a right atrial pressure of 8 mmHg, whichis consistent with severe pulmonary hypertension.  17. C/w the study of 6-21-20, more significant pulmonary htn is now noted.  18. Endocardial visualization was enhanced with intravenous echo contrast.  19. Mitral valve mean gradient is 6.8 mmHg consistent with moderate mitral stenosis.  Montana Barreto MD FACC, ANGELA, FAC  < end of copied text >                   70 years      h/o   CAD,  mechanical MVR and Tricuspid valve repair,        h/o  chronic   HFpEF,   HTN  . MIGUEL ,     severe MR  ,  s/p mechanical MVR 1999 , [facility] with Dr. Anderson,  and severe TR s/p TVR 2012 ([facility]),       COPD ,on home 02; no prior tobacco use      AFib on Coumadin , failed  ablation,   ,  PHTN,   CKD 3 (b/l SCr ~1.3-1.6),  MIGUEL and gout.     h/o   hypercarbia and she reports  needing Bipap,in the past,  but hasn't used in 10 years.      Morbid  obesity,  BMI is 41     presented with  CP,  dizziness and sob     She also had epistaxis requiring packed red blood cell support.     tele, Afib  mostly in 60's to  70's, briefly  33, while  asleep     1.,  Atrial fibrillation    on Lasix  bid,   metoprolol ER 25 mg  / lipitor  card  dr barreto   2.  MVR  mechanical,1999  Continue IV heparin and warfarin with goal INR of 2.5-3.5 with mechanical MVR   daily INR   3.  TVR, 2012 at [facility]   4.  HTN, on Toprol   5. Dizziness , since resolved     spoke  with neuro dr brenner, no further   w/p is needed  Ct head , with infarcts, Right b. ganglia and Right cerebellum   6. .  Anemia, hb is  7.9   c/c  anemia, , baseline is  8  to 9/   last colonoscopy  was  10 yrs  ago/ s/p melena last yr. and, no w/p was done.,as  pt was  deemed high risk  Gi eval/ dr elizondo,   stool guaic/ iron studies/Ct A/P  pt  at high risk for  any procedure  7.  Acute  Diastolic  Chf    on iv lasix 60  mg,   bid , pt has  no sob at rest /  seen by card       daily weights/ follow   I/O//   PT  eval  8.  Severe Pulm Htn, on echo  Echo  12/20,normal  ef/mod  MS/  severe Pulm Htn     Hb is 7.6/28,  and  INR is 2.2/  coumadin  dosed    CT a/p, pending       < from: CT Head No Cont (12.29.20 @ 20:41)   IMPRESSION: No acute intracranial hemorrhage. Age-indeterminate right basal ganglia lacunar infarct and right cerebellar lacunar infarct. If there is clinical suspicion for acute infarct, consider brain MRI if there is no contraindication.  < end of copied text >      < from: TTE Echo Complete w/ Contrast w/ Doppler (12.31.20 @ 14:01) >  ummary:   1. Left ventricular ejection fraction, by visual estimation, is 60 to 65%.   2. Technically suboptimal study.   3. Normal global left ventricular systolic function.   4. Normal left ventricular internal cavity size.   5. Spectral Doppler shows pseudonormal pattern of left ventricular myocardial filling (Grade II diastolic dysfunction).   6. Normal right ventricular size and function.   7. Mild to moderately enlarged left atrium.   8. There is no evidence of pericardial effusion.   9. Mild mitral annular calcification.  10. Mild mitral valve regurgitation.  11. Mild thickening and calcification of the anterior and posterior mitral valve leaflets.  12. Peak transmitral mean gradient equals 6.8 mmHg, calculated mitral valve area by pressure half time equals 1.86 cm² consistent withmild mitral stenosis.  13. Mild tricuspid regurgitation.  14. Mild aortic regurgitation.  15. Sclerotic aortic valve with normal opening.  16. Estimated pulmonary artery systolic pressure is 68.4 mmHg assuming a right atrial pressure of 8 mmHg, whichis consistent with severe pulmonary hypertension.  17. C/w the study of 6-21-20, more significant pulmonary htn is now noted.  18. Endocardial visualization was enhanced with intravenous echo contrast.  19. Mitral valve mean gradient is 6.8 mmHg consistent with moderate mitral stenosis.  Montana Barreto MD FACC, FASE, FAC  < end of copied text >                   70 years      h/o   CAD,  mechanical MVR and Tricuspid valve repair,        h/o  chronic   HFpEF,   HTN  . MIGUEL ,     severe MR  ,  s/p mechanical MVR 1999 , [facility] with Dr. Anderson,  and severe TR s/p TVR 2012 ([facility]),       COPD ,on home 02; no prior tobacco use      AFib on Coumadin , failed  ablation,   ,  PHTN,   CKD 3 (b/l SCr ~1.3-1.6),  MIGUEL and gout.     h/o   hypercarbia and she reports  needing Bipap,in the past,  but hasn't used in 10 years.      Morbid  obesity,  BMI is 41     presented with  CP,  dizziness and sob     She also had epistaxis requiring packed red blood cell support.     tele, Afib  mostly in 60's to  70's, briefly  33, while  asleep     1.,  Atrial fibrillation    on Lasix  bid,   metoprolol ER 25 mg  / lipitor  card  dr barreto   2.  MVR  mechanical,1999  Continue IV heparin and warfarin with goal INR of 2.5-3.5 with mechanical MVR   daily INR   3.  TVR, 2012 at [facility]   4.  HTN, on Toprol   5. Dizziness , since resolved     spoke  with neuro dr brenner, no further   w/p is needed  Ct head , with infarcts, Right b. ganglia and Right cerebellum   6. .  Anemia, hb is  7.9   c/c  anemia, , baseline is  8  to 9/   last colonoscopy  was  10 yrs  ago/ s/p melena last yr. and, no w/p was done.,as  pt was  deemed high risk  Gi eval/ dr elizondo,   stool guaic/ iron studies/Ct A/P  pt  at high risk for  any procedure  7.  Acute  Diastolic  Chf    on iv lasix 60  mg,   bid /  may need  lasix  drip/ spoke  with  card       daily weights/ follow   I/O//   PT  eval  8.  Severe Pulm Htn, on echo  Echo  12/20,normal  ef/mod  MS/  severe Pulm Htn     Hb is 7.6/28,  and  INR is 2.2/  coumadin  dosed    CT a/p, pending  spoke with  card / will need  aggressive  diuresis/ iv lasix  drip       < from: CT Head No Cont (12.29.20 @ 20:41)   IMPRESSION: No acute intracranial hemorrhage. Age-indeterminate right basal ganglia lacunar infarct and right cerebellar lacunar infarct. If there is clinical suspicion for acute infarct, consider brain MRI if there is no contraindication.  < end of copied text >      < from: TTE Echo Complete w/ Contrast w/ Doppler (12.31.20 @ 14:01) >  ummary:   1. Left ventricular ejection fraction, by visual estimation, is 60 to 65%.   2. Technically suboptimal study.   3. Normal global left ventricular systolic function.   4. Normal left ventricular internal cavity size.   5. Spectral Doppler shows pseudonormal pattern of left ventricular myocardial filling (Grade II diastolic dysfunction).   6. Normal right ventricular size and function.   7. Mild to moderately enlarged left atrium.   8. There is no evidence of pericardial effusion.   9. Mild mitral annular calcification.  10. Mild mitral valve regurgitation.  11. Mild thickening and calcification of the anterior and posterior mitral valve leaflets.  12. Peak transmitral mean gradient equals 6.8 mmHg, calculated mitral valve area by pressure half time equals 1.86 cm² consistent withmild mitral stenosis.  13. Mild tricuspid regurgitation.  14. Mild aortic regurgitation.  15. Sclerotic aortic valve with normal opening.  16. Estimated pulmonary artery systolic pressure is 68.4 mmHg assuming a right atrial pressure of 8 mmHg, whichis consistent with severe pulmonary hypertension.  17. C/w the study of 6-21-20, more significant pulmonary htn is now noted.  18. Endocardial visualization was enhanced with intravenous echo contrast.  19. Mitral valve mean gradient is 6.8 mmHg consistent with moderate mitral stenosis.  Montana Barreto MD FACC, FASE, FAC  < end of copied text >                   70 years      h/o   CAD,  mechanical MVR and Tricuspid valve repair,        h/o  chronic   HFpEF,   HTN  . MIGUEL ,     severe MR  ,  s/p mechanical MVR 1999 , [facility] with Dr. Anderson,  and severe TR s/p TVR 2012 ([facility]),       COPD ,on home 02; no prior tobacco use      AFib on Coumadin , failed  ablation,   ,  PHTN,   CKD 3 (b/l SCr ~1.3-1.6),  MIGUEL and gout.     h/o   hypercarbia and she reports  needing Bipap,in the past,  but hasn't used in 10 years.      Morbid  obesity,  BMI is 41     presented with  CP,  dizziness and sob     She also had epistaxis requiring packed red blood cell support.     tele, Afib  mostly in 60's to  70's, briefly  35, while  asleep     1.,  Atrial fibrillation    on Lasix  bid,   metoprolol ER 25 mg  / lipitor  card  dr barreto   2.  MVR  mechanical,1999  Continue IV heparin and warfarin with goal INR of 2.5-3.5 with mechanical MVR   daily INR   3.  TVR, 2012 at [facility]   4.  HTN, on Toprol   5. Dizziness , since resolved     spoke  with neuro dr brenner, no further   w/p is needed  Ct head , with infarcts, Right b. ganglia and Right cerebellum   6. .  Anemia, hb is  7.9   c/c  anemia, , baseline is  8  to 9/   last colonoscopy  was  10 yrs  ago/ s/p melena last yr. and, no w/p was done.,as  pt was  deemed high risk  Gi eval/ dr elizondo,   stool guaic/ iron studies/Ct A/P  pt  at high risk for  any procedure  7.  Acute  Diastolic  Chf    on iv lasix 60  mg,   bid /       daily weights/ follow   I/O//   PT  eval  8.  Severe Pulm Htn, on echo  Echo  12/20,normal  ef/mod  MS/  severe Pulm Htn     Hb is 7.6/28,  and  INR is 2.2/  coumadin  dosed  spoke with  card /DR orellana    pt to be  transferred to  [place]   for ELIZABETH/ ? fistula  from aorta  to RV/   based  on review  of prior  echo       < from: CT Head No Cont (12.29.20 @ 20:41)   IMPRESSION: No acute intracranial hemorrhage. Age-indeterminate right basal ganglia lacunar infarct and right cerebellar lacunar infarct. If there is clinical suspicion for acute infarct, consider brain MRI if there is no contraindication.  < end of copied text >      < from: TTE Echo Complete w/ Contrast w/ Doppler (12.31.20 @ 14:01) >  ummary:   1. Left ventricular ejection fraction, by visual estimation, is 60 to 65%.   2. Technically suboptimal study.   3. Normal global left ventricular systolic function.   4. Normal left ventricular internal cavity size.   5. Spectral Doppler shows pseudonormal pattern of left ventricular myocardial filling (Grade II diastolic dysfunction).   6. Normal right ventricular size and function.   7. Mild to moderately enlarged left atrium.   8. There is no evidence of pericardial effusion.   9. Mild mitral annular calcification.  10. Mild mitral valve regurgitation.  11. Mild thickening and calcification of the anterior and posterior mitral valve leaflets.  12. Peak transmitral mean gradient equals 6.8 mmHg, calculated mitral valve area by pressure half time equals 1.86 cm² consistent withmild mitral stenosis.  13. Mild tricuspid regurgitation.  14. Mild aortic regurgitation.  15. Sclerotic aortic valve with normal opening.  16. Estimated pulmonary artery systolic pressure is 68.4 mmHg assuming a right atrial pressure of 8 mmHg, whichis consistent with severe pulmonary hypertension.  17. C/w the study of 6-21-20, more significant pulmonary htn is now noted.  18. Endocardial visualization was enhanced with intravenous echo contrast.  19. Mitral valve mean gradient is 6.8 mmHg consistent with moderate mitral stenosis.  Montana Barreto MD FACC, FASE, FAC  < end of copied text >                   70 years      h/o   CAD,  mechanical MVR and Tricuspid valve repair,        h/o  chronic   HFpEF,   HTN  . MIGUEL ,     severe MR  ,  s/p mechanical MVR 1999 , [facility] with Dr. Anderson,  and severe TR s/p TVR 2012 ([facility]),       COPD ,on home 02; no prior tobacco use      AFib on Coumadin , failed  ablation,   ,  PHTN,   CKD 3 (b/l SCr ~1.3-1.6),  MIGUEL and gout.     h/o   hypercarbia and she reports  needing Bipap,in the past,  but hasn't used in 10 years.      Morbid  obesity,  BMI is 41     presented with  CP,  dizziness and sob     She also had epistaxis requiring packed red blood cell support.     tele, Afib  mostly in 60's to  70's, briefly  35, while  asleep     1.,  Atrial fibrillation    on Lasix  bid,   metoprolol ER 25 mg  / lipitor  card  dr barreto   2.  MVR  mechanical,1999  Continue IV heparin and warfarin with goal INR of 2.5-3.5 with mechanical MVR   daily INR   3.  TVR, 2012 at [facility]   4.  HTN, on Toprol   5. Dizziness , since resolved     spoke  with neuro dr brenner, no further   w/p is needed  Ct head , with infarcts, Right b. ganglia and Right cerebellum   6. .  Anemia, hb is  7.9   c/c  anemia, , baseline is  8  to 9/   last colonoscopy  was  10 yrs  ago/ s/p melena last yr. and, no w/p was done.,as  pt was  deemed high risk  Gi eval/ dr elizondo,   stool guaic/ iron studies/Ct A/P  pt  at high risk for  any procedure  7.  Acute  Diastolic  Chf    on iv lasix 60  mg,   bid /       daily weights/ follow   I/O//   still  with sob on exertion  8.  Severe Pulm Htn, on echo  Echo  12/20,normal  ef/mod  MS/  severe Pulm Htn{ new]     Hb is 7.6/28,  and  INR is 2.2/  coumadin  dosed  spoke with  card /DR orellana    pt to be  transferred to  [place]   for ELIZABETH/ ? fistula  from aorta  to RV/   based  on review  of prior  echo  spoke  with  family  floor  PA and   RN, aware        < from: CT Head No Cont (12.29.20 @ 20:41)   IMPRESSION: No acute intracranial hemorrhage. Age-indeterminate right basal ganglia lacunar infarct and right cerebellar lacunar infarct. If there is clinical suspicion for acute infarct, consider brain MRI if there is no contraindication.  < end of copied text >      < from: TTE Echo Complete w/ Contrast w/ Doppler (12.31.20 @ 14:01) >  ummary:   1. Left ventricular ejection fraction, by visual estimation, is 60 to 65%.   2. Technically suboptimal study.   3. Normal global left ventricular systolic function.   4. Normal left ventricular internal cavity size.   5. Spectral Doppler shows pseudonormal pattern of left ventricular myocardial filling (Grade II diastolic dysfunction).   6. Normal right ventricular size and function.   7. Mild to moderately enlarged left atrium.   8. There is no evidence of pericardial effusion.   9. Mild mitral annular calcification.  10. Mild mitral valve regurgitation.  11. Mild thickening and calcification of the anterior and posterior mitral valve leaflets.  12. Peak transmitral mean gradient equals 6.8 mmHg, calculated mitral valve area by pressure half time equals 1.86 cm² consistent withmild mitral stenosis.  13. Mild tricuspid regurgitation.  14. Mild aortic regurgitation.  15. Sclerotic aortic valve with normal opening.  16. Estimated pulmonary artery systolic pressure is 68.4 mmHg assuming a right atrial pressure of 8 mmHg, whichis consistent with severe pulmonary hypertension.  17. C/w the study of 6-21-20, more significant pulmonary htn is now noted.  18. Endocardial visualization was enhanced with intravenous echo contrast.  19. Mitral valve mean gradient is 6.8 mmHg consistent with moderate mitral stenosis.  Montana Barreto MD FACC, FASE, FAC  < end of copied text >

## 2021-01-05 NOTE — PROGRESS NOTE ADULT - ASSESSMENT
HPI:   70 year old female with PMH of CAD with Mitral and tricuspid valve repair hx  on coumadin, INR goal 2.5-3.5, afib s/p ablation, COPD on 3L home O2, HTN, HLD, Gout, CKD 3,  otherwise presenting to ED due to chest pain on left with associated dizziness, mild headaches and SOB with exertion for past 1 week without fever/chills but symptoms worsening so pt came in for evaluation.     In the ED, pt had labwork which revealed INR 1.7, trop 0.015, ekg RBBB ?chronic, no st changes.   (29 Dec 2020 18:30)  ------- As Above ---------------------------  The patient has a history of anemia for a while. The patient denies melena, hematochezia, hematemesis, nausea, vomiting, abdominal pain, constipation, diarrhea, or change in bowel movements. The patient was told that she was anemic but could not get medical clearance for a colonoscopy or EGD  The patient state that she had a major bleed ( melena ) necessitating blood transfusions in July but no procedures were done.  Last colonoscopy was 10 years ago. No family history of colon cancer  See labs ( stable )      Anemia, chronic - Patient presently unstable for GI w/u at the present time. No acute GI symptoms. 1)W/U when stable.   ===== Will follow on a PRN basis

## 2021-01-05 NOTE — PROVIDER CONTACT NOTE (OTHER) - ACTION/TREATMENT ORDERED:
GERI Unger aware, no action taken or orders placed, will endorse
no new order continue to monitor patient.

## 2021-01-05 NOTE — PROVIDER CONTACT NOTE (OTHER) - ASSESSMENT
pt asymptomatic and asleep. pt ofelia to 39. no pain or distress noted, no sob or chest pain. PA Singer Unger aware. pt went  back to 61 bpm

## 2021-01-06 ENCOUNTER — INPATIENT (INPATIENT)
Facility: HOSPITAL | Age: 71
LOS: 13 days | Discharge: NURSING, REHAB OR LONG-TERM CARE | DRG: 291 | End: 2021-01-20
Attending: INTERNAL MEDICINE | Admitting: INTERNAL MEDICINE
Payer: MEDICARE

## 2021-01-06 VITALS
TEMPERATURE: 99 F | WEIGHT: 240.97 LBS | OXYGEN SATURATION: 94 % | DIASTOLIC BLOOD PRESSURE: 70 MMHG | SYSTOLIC BLOOD PRESSURE: 122 MMHG | HEIGHT: 64 IN | HEART RATE: 74 BPM | RESPIRATION RATE: 20 BRPM

## 2021-01-06 VITALS
OXYGEN SATURATION: 100 % | DIASTOLIC BLOOD PRESSURE: 83 MMHG | RESPIRATION RATE: 21 BRPM | SYSTOLIC BLOOD PRESSURE: 166 MMHG | TEMPERATURE: 98 F | HEART RATE: 77 BPM

## 2021-01-06 DIAGNOSIS — Z95.2 PRESENCE OF PROSTHETIC HEART VALVE: ICD-10-CM

## 2021-01-06 DIAGNOSIS — R07.9 CHEST PAIN, UNSPECIFIED: ICD-10-CM

## 2021-01-06 DIAGNOSIS — I27.20 PULMONARY HYPERTENSION, UNSPECIFIED: ICD-10-CM

## 2021-01-06 DIAGNOSIS — Z95.4 PRESENCE OF OTHER HEART-VALVE REPLACEMENT: Chronic | ICD-10-CM

## 2021-01-06 DIAGNOSIS — Z95.4 PRESENCE OF OTHER HEART-VALVE REPLACEMENT: ICD-10-CM

## 2021-01-06 DIAGNOSIS — I48.20 CHRONIC ATRIAL FIBRILLATION, UNSPECIFIED: ICD-10-CM

## 2021-01-06 DIAGNOSIS — J44.9 CHRONIC OBSTRUCTIVE PULMONARY DISEASE, UNSPECIFIED: ICD-10-CM

## 2021-01-06 DIAGNOSIS — N18.9 CHRONIC KIDNEY DISEASE, UNSPECIFIED: ICD-10-CM

## 2021-01-06 DIAGNOSIS — I50.30 UNSPECIFIED DIASTOLIC (CONGESTIVE) HEART FAILURE: ICD-10-CM

## 2021-01-06 LAB
ANION GAP SERPL CALC-SCNC: 10 MMOL/L — SIGNIFICANT CHANGE UP (ref 5–17)
APTT BLD: 101.7 SEC — HIGH (ref 27.5–35.5)
APTT BLD: 48.3 SEC — HIGH (ref 27.5–35.5)
APTT BLD: 52.8 SEC — HIGH (ref 27.5–35.5)
BUN SERPL-MCNC: 46 MG/DL — HIGH (ref 7–23)
CALCIUM SERPL-MCNC: 10.1 MG/DL — SIGNIFICANT CHANGE UP (ref 8.4–10.5)
CHLORIDE SERPL-SCNC: 95 MMOL/L — LOW (ref 96–108)
CO2 SERPL-SCNC: 32 MMOL/L — HIGH (ref 22–31)
CREAT SERPL-MCNC: 1.5 MG/DL — HIGH (ref 0.5–1.3)
GLUCOSE SERPL-MCNC: 104 MG/DL — HIGH (ref 70–99)
HCT VFR BLD CALC: 27.8 % — LOW (ref 34.5–45)
HCT VFR BLD CALC: 28.3 % — LOW (ref 34.5–45)
HGB BLD-MCNC: 8 G/DL — LOW (ref 11.5–15.5)
HGB BLD-MCNC: 8.1 G/DL — LOW (ref 11.5–15.5)
INR BLD: 2.15 RATIO — HIGH (ref 0.88–1.16)
MCHC RBC-ENTMCNC: 25.7 PG — LOW (ref 27–34)
MCHC RBC-ENTMCNC: 26.1 PG — LOW (ref 27–34)
MCHC RBC-ENTMCNC: 28.3 GM/DL — LOW (ref 32–36)
MCHC RBC-ENTMCNC: 29.1 GM/DL — LOW (ref 32–36)
MCV RBC AUTO: 89.7 FL — SIGNIFICANT CHANGE UP (ref 80–100)
MCV RBC AUTO: 91 FL — SIGNIFICANT CHANGE UP (ref 80–100)
NRBC # BLD: 0 /100 WBCS — SIGNIFICANT CHANGE UP (ref 0–0)
NRBC # BLD: 0 /100 WBCS — SIGNIFICANT CHANGE UP (ref 0–0)
PLATELET # BLD AUTO: 364 K/UL — SIGNIFICANT CHANGE UP (ref 150–400)
PLATELET # BLD AUTO: 369 K/UL — SIGNIFICANT CHANGE UP (ref 150–400)
POTASSIUM SERPL-MCNC: 4.3 MMOL/L — SIGNIFICANT CHANGE UP (ref 3.5–5.3)
POTASSIUM SERPL-SCNC: 4.3 MMOL/L — SIGNIFICANT CHANGE UP (ref 3.5–5.3)
PROTHROM AB SERPL-ACNC: 24.9 SEC — HIGH (ref 10.6–13.6)
RBC # BLD: 3.1 M/UL — LOW (ref 3.8–5.2)
RBC # BLD: 3.11 M/UL — LOW (ref 3.8–5.2)
RBC # FLD: 16.3 % — HIGH (ref 10.3–14.5)
RBC # FLD: 16.3 % — HIGH (ref 10.3–14.5)
SODIUM SERPL-SCNC: 137 MMOL/L — SIGNIFICANT CHANGE UP (ref 135–145)
WBC # BLD: 8.4 K/UL — SIGNIFICANT CHANGE UP (ref 3.8–10.5)
WBC # BLD: 8.96 K/UL — SIGNIFICANT CHANGE UP (ref 3.8–10.5)
WBC # FLD AUTO: 8.4 K/UL — SIGNIFICANT CHANGE UP (ref 3.8–10.5)
WBC # FLD AUTO: 8.96 K/UL — SIGNIFICANT CHANGE UP (ref 3.8–10.5)

## 2021-01-06 PROCEDURE — 93306 TTE W/DOPPLER COMPLETE: CPT | Mod: 26

## 2021-01-06 PROCEDURE — 99223 1ST HOSP IP/OBS HIGH 75: CPT

## 2021-01-06 PROCEDURE — 99233 SBSQ HOSP IP/OBS HIGH 50: CPT

## 2021-01-06 RX ORDER — HEPARIN SODIUM 5000 [USP'U]/ML
1200 INJECTION INTRAVENOUS; SUBCUTANEOUS
Qty: 25000 | Refills: 0 | Status: DISCONTINUED | OUTPATIENT
Start: 2021-01-06 | End: 2021-01-06

## 2021-01-06 RX ORDER — HEPARIN SODIUM 5000 [USP'U]/ML
4000 INJECTION INTRAVENOUS; SUBCUTANEOUS EVERY 6 HOURS
Refills: 0 | Status: DISCONTINUED | OUTPATIENT
Start: 2021-01-06 | End: 2021-01-06

## 2021-01-06 RX ORDER — HEPARIN SODIUM 5000 [USP'U]/ML
4000 INJECTION INTRAVENOUS; SUBCUTANEOUS EVERY 6 HOURS
Refills: 0 | Status: DISCONTINUED | OUTPATIENT
Start: 2021-01-06 | End: 2021-01-09

## 2021-01-06 RX ORDER — HEPARIN SODIUM 5000 [USP'U]/ML
9000 INJECTION INTRAVENOUS; SUBCUTANEOUS EVERY 6 HOURS
Refills: 0 | Status: DISCONTINUED | OUTPATIENT
Start: 2021-01-06 | End: 2021-01-06

## 2021-01-06 RX ORDER — BUDESONIDE AND FORMOTEROL FUMARATE DIHYDRATE 160; 4.5 UG/1; UG/1
2 AEROSOL RESPIRATORY (INHALATION)
Refills: 0 | Status: DISCONTINUED | OUTPATIENT
Start: 2021-01-06 | End: 2021-01-20

## 2021-01-06 RX ORDER — ALLOPURINOL 300 MG
100 TABLET ORAL DAILY
Refills: 0 | Status: DISCONTINUED | OUTPATIENT
Start: 2021-01-06 | End: 2021-01-20

## 2021-01-06 RX ORDER — WARFARIN SODIUM 2.5 MG/1
7 TABLET ORAL ONCE
Refills: 0 | Status: COMPLETED | OUTPATIENT
Start: 2021-01-06 | End: 2021-01-06

## 2021-01-06 RX ORDER — DIGOXIN 250 MCG
1 TABLET ORAL
Qty: 0 | Refills: 0 | DISCHARGE

## 2021-01-06 RX ORDER — HEPARIN SODIUM 5000 [USP'U]/ML
9000 INJECTION INTRAVENOUS; SUBCUTANEOUS EVERY 6 HOURS
Refills: 0 | Status: DISCONTINUED | OUTPATIENT
Start: 2021-01-06 | End: 2021-01-09

## 2021-01-06 RX ORDER — HEPARIN SODIUM 5000 [USP'U]/ML
1400 INJECTION INTRAVENOUS; SUBCUTANEOUS
Qty: 25000 | Refills: 0 | Status: DISCONTINUED | OUTPATIENT
Start: 2021-01-06 | End: 2021-01-09

## 2021-01-06 RX ORDER — BUMETANIDE 0.25 MG/ML
2 INJECTION INTRAMUSCULAR; INTRAVENOUS EVERY 12 HOURS
Refills: 0 | Status: DISCONTINUED | OUTPATIENT
Start: 2021-01-06 | End: 2021-01-09

## 2021-01-06 RX ORDER — SIMVASTATIN 20 MG/1
10 TABLET, FILM COATED ORAL AT BEDTIME
Refills: 0 | Status: DISCONTINUED | OUTPATIENT
Start: 2021-01-06 | End: 2021-01-20

## 2021-01-06 RX ORDER — FUROSEMIDE 40 MG
60 TABLET ORAL ONCE
Refills: 0 | Status: COMPLETED | OUTPATIENT
Start: 2021-01-06 | End: 2021-01-06

## 2021-01-06 RX ORDER — METOPROLOL TARTRATE 50 MG
25 TABLET ORAL DAILY
Refills: 0 | Status: DISCONTINUED | OUTPATIENT
Start: 2021-01-06 | End: 2021-01-19

## 2021-01-06 RX ORDER — FUROSEMIDE 40 MG
60 TABLET ORAL
Refills: 0 | Status: DISCONTINUED | OUTPATIENT
Start: 2021-01-06 | End: 2021-01-06

## 2021-01-06 RX ADMIN — HEPARIN SODIUM 4000 UNIT(S): 5000 INJECTION INTRAVENOUS; SUBCUTANEOUS at 11:28

## 2021-01-06 RX ADMIN — WARFARIN SODIUM 7 MILLIGRAM(S): 2.5 TABLET ORAL at 21:47

## 2021-01-06 RX ADMIN — SIMVASTATIN 10 MILLIGRAM(S): 20 TABLET, FILM COATED ORAL at 21:47

## 2021-01-06 RX ADMIN — HEPARIN SODIUM 1200 UNIT(S)/HR: 5000 INJECTION INTRAVENOUS; SUBCUTANEOUS at 01:16

## 2021-01-06 RX ADMIN — Medication 100 MILLIGRAM(S): at 11:11

## 2021-01-06 RX ADMIN — HEPARIN SODIUM 1400 UNIT(S)/HR: 5000 INJECTION INTRAVENOUS; SUBCUTANEOUS at 12:26

## 2021-01-06 RX ADMIN — Medication 60 MILLIGRAM(S): at 09:19

## 2021-01-06 RX ADMIN — BUMETANIDE 2 MILLIGRAM(S): 0.25 INJECTION INTRAMUSCULAR; INTRAVENOUS at 16:53

## 2021-01-06 RX ADMIN — HEPARIN SODIUM 1200 UNIT(S)/HR: 5000 INJECTION INTRAVENOUS; SUBCUTANEOUS at 11:11

## 2021-01-06 RX ADMIN — BUDESONIDE AND FORMOTEROL FUMARATE DIHYDRATE 2 PUFF(S): 160; 4.5 AEROSOL RESPIRATORY (INHALATION) at 16:13

## 2021-01-06 RX ADMIN — Medication 25 MILLIGRAM(S): at 11:11

## 2021-01-06 RX ADMIN — HEPARIN SODIUM 1200 UNIT(S)/HR: 5000 INJECTION INTRAVENOUS; SUBCUTANEOUS at 18:10

## 2021-01-06 NOTE — PHYSICAL THERAPY INITIAL EVALUATION ADULT - PLANNED THERAPY INTERVENTIONS, PT EVAL
GOAL: Pt will negotiate 4 steps with unilateral handrail in a step-to pattern independently within 2 weeks/balance training/gait training/strengthening/transfer training

## 2021-01-06 NOTE — PHYSICAL THERAPY INITIAL EVALUATION ADULT - PERTINENT HX OF CURRENT PROBLEM, REHAB EVAL
71 y/o F with h/o CAD, HFpEF, severe MR s/p mechanical MVR, severe TR s/p mechanical TVR, afib s/p ablation, CKD, COPD (on 3L NC), presented to [facility] for chest pain, SOB, dizziness for 1 week. Found to be in decompensated HFpEF and tachy-ofelia at times. TTE 12/31 concerning for possible fistula between aortic valve and RV. Plan for ELIZABETH tomorrow.

## 2021-01-06 NOTE — H&P ADULT - NSHPPHYSICALEXAM_GEN_ALL_CORE
PHYSICAL EXAMINATION:  Vital Signs Last 24 Hrs  T(C): 37.1 (06 Jan 2021 04:45), Max: 37.1 (06 Jan 2021 04:45)  T(F): 98.8 (06 Jan 2021 04:45), Max: 98.8 (06 Jan 2021 04:45)  HR: 74 (06 Jan 2021 04:45) (63 - 98)  BP: 122/70 (06 Jan 2021 04:45) (115/60 - 166/83)  BP(mean): --  RR: 20 (06 Jan 2021 04:45) (18 - 21)  SpO2: 94% (06 Jan 2021 04:45) (94% - 100%)  CAPILLARY BLOOD GLUCOSE      POCT Blood Glucose.: 144 mg/dL (05 Jan 2021 17:05)      01-06 @ 07:01  -  01-06 @ 09:12  --------------------------------------------------------  IN: 0 mL / OUT: 400 mL / NET: -400 mL        GENERAL: NAD, well-groomed,  HEAD:  atraumatic, normocephalic  EYES: sclera anicteric  ENMT: mucous membranes moist  NECK: supple, No JVD  CHEST/LUNG: clear to auscultation bilaterally;    no      rales   ,   no rhonchi,   HEART: normal S1, S2  ABDOMEN: BS+, soft, ND, NT   EXTREMITIES:   less    edema    b/l LEs  NEURO: awake, ,     moves all extremities  SKIN: no     rash

## 2021-01-06 NOTE — CONSULT NOTE ADULT - PROBLEM SELECTOR RECOMMENDATION 9
- remains hypervolemic on exam  - c/w IV lasix 40 BID   - Fluid restrict 1.5 L - remains hypervolemic on exam  - c/w IV lasix 60 BID   - Fluid restrict 1.5 L - remains hypervolemic on exam  - c/w IV lasix 60 BID   - Fluid restrict 1.5 L  - NPO after MN for ELIZABETH to r/o aortic-RV fistula - remains hypervolemic on exam  - c/w IV lasix 60 BID   - Fluid restrict 1.5 L, daily standing weights   - NPO after MN for ELIZABETH to r/o aortic-RV fistula - remains hypervolemic on exam  - d/c lasix   - start IV bumex 2mg BID  - Fluid restrict 1.5 L, daily standing weights   - repeat TTE now for evaluation of ?L-R shunt   - NPO after MN for ELIZABETH in AM Remains hypervolemic on exam  - d/c lasix   - start IV bumex 2mg BID  - Fluid restrict 1.5 L, daily standing weights   - repeat TTE now to eval ?L-V shunt as initial study w/ poor image quality   - NPO after MN for ELIZABETH in AM

## 2021-01-06 NOTE — CONSULT NOTE ADULT - PROBLEM SELECTOR RECOMMENDATION 2
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

## 2021-01-06 NOTE — CONSULT NOTE ADULT - PROBLEM SELECTOR RECOMMENDATION 5
Presented to [facility] w/ MISAEL on CKD   - holding home ramipril 5 mg iso MISAEL on CKD   - Cr now at bl 1.3-1.5 - [facility] 10/2020 c/f mixed post and pre-capillary pHTN   - PSAP 68.4 (severe pHTN), likely elevated iso decompensated HF  - c/w diuresis as above

## 2021-01-06 NOTE — CONSULT NOTE ADULT - ASSESSMENT
71 yo female with a PMHx of CAD, HFpEF (EF 65-60%), severe MR s/p mechanical MVR 1999 (Dr. Anderson, [facility]), severe TR s/p ?mechanical TVR (2012 [facility]), afib s/p ablation, HTN, HLD, CKD (bl 1.35-1.5), COPD, MIGUEL who presented to the [facility] for cp, sob, dizziness, found to be in decompensated HFpEF and tachy-ofelia at times. Seen by Dr. Thrasher Cardiology at [facility]. s/p IV diuresis w/ lasix BID. TTE 12/31 reviewed by [facility] Dr. 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 Patient transferred to [facility] for ELIZABETH and escalation of care. HF consulted for further mgt of HFpEF.     Pertinent studies:     [facility] 10/2020: RA 15/20/15, RV 51/21, PA 50/17/29, PCW 21/20/16, CO/CI 7.56/3.61, /66/89;     TTE 12/31: preserved EF, Stage II diastolic dysfxn, mild-mod AS (valve area 1.4), severe pulm HTN (PASP 68.4), ?fistula as above    71 yo female with a PMHx of CAD, HFpEF (EF 65-60%), severe MR s/p mechanical MVR 1999 (Dr. Anderson, [facility]), severe TR s/p mechanical TVR (2012 [facility]), afib s/p ablation, HTN, HLD, CKD (bl 1.35-1.5), COPD on 3L NC at home, MIGUEL who presented to the [facility] for cp, sob, dizziness x 1 week, found to be in decompensated HFpEF and tachy-ofelia at times. Seen by Dr. Thrasher Cardiology at [facility]. s/p IV diuresis w/ lasix BID. TTE 12/31 reviewed by [facility] Dr. 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 Patient transferred to [facility] for ELIZABETH and escalation of care. HF consulted for further mgt of HFpEF.     Pertinent studies:     [facility] 10/2020: RA 15/20/15, RV 51/21, PA 50/17/29, PCW 21/20/16, CO/CI 7.56/3.61, /66/89;     TTE 12/31: preserved EF, Stage II diastolic dysfxn, mild-mod AS (valve area 1.4), severe pulm HTN (PASP 68.4), ?fistula as above    69 yo female with a PMHx of CAD, HFpEF (EF 65-60%), severe MR s/p mechanical MVR 1999 (Dr. Anderson, [facility]), severe TR s/p mechanical TVR (2012 [facility]), afib s/p ablation, HTN, HLD, CKD (bl 1.35-1.5), COPD on 3L NC at home, MIGUEL who presented to the [facility] for cp, sob, dizziness x 1 week, found to be in decompensated HFpEF, w/ tachy-ofelia rhythm. Seen by Dr. Thrasher Cardiology at [facility]. s/p IV diuresis w/ lasix BID. TTE 12/31 reviewed by [facility] Dr. 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 Patient transferred to [facility] for ELIZABETH and escalation of care. HF consulted for further mgt of HFpEF.     Pertinent studies:     [facility] 10/2020: RA 15/20/15, RV 51/21, PA 50/17/29, PCW 21/20/16, CO/CI 7.56/3.61, /66/89;     TTE 12/31: preserved EF, Stage II diastolic dysfxn, mild-mod AS (valve area 1.4), severe pulm HTN (PASP 68.4), ?fistula as above    69 yo female with a PMHx of CAD, HFpEF (EF 65-60%), severe MR s/p mechanical MVR 1999 (Dr. Anderson, [facility]), severe TR s/p mechanical TVR (2012 [facility]), afib s/p ablation, HTN, HLD, CKD (bl 1.35-1.5), COPD on 3L NC at home, MIGUEL who presented to the [facility] for cp, sob, dizziness x 1 week, found to be in decompensated HFpEF, w/ tachy-ofelia rhythm. Seen by Dr. Thrasher Cardiology at [facility]. s/p IV diuresis w/ lasix BID. TTE 12/31 reviewed by [facility] Dr. 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 Patient transferred to [facility] for ELIZABETH and escalation of care. HF consulted for further mgt of HFpEF.     Pertinent studies:     [facility] 10/2020: RA 15/20/15, RV 51/21, PA 50/17/29, PCW 21/20/16, CO/CI 7.56/3.61, /66/89;     EKG 12/30: NSR 69, RBBB  TTE 12/31: preserved EF, Stage II diastolic dysfxn, mild-mod AS (valve area 1.4), severe pulm HTN (PASP 68.4), mild AR/MR/TR, ?AV-RV fistula    71 yo female with a PMHx of CAD, HFpEF (EF 65-60%), severe MR s/p mechanical MVR 1999 (Dr. Anderson, [facility]), severe TR s/p mechanical TVR (2012 [facility]), afib s/p ablation, HTN, HLD, CKD (bl 1.35-1.5), COPD on 3L NC at home, MIGUEL who presented to the [facility] for cp, sob, dizziness x 1 week, found to be in decompensated HFpEF, w/ tachy-ofelia rhythm. Seen by Dr. Thrasher Cardiology at [facility]. s/p IV diuresis w/ lasix BID. TTE 12/31 reviewed by [facility] Dr. Fregoso, c/f possible L-R shunt, ?fistula between aortic valve and RV. Patient transferred to [facility] for ELIZABETH and escalation of care. HF consulted for further mgt of HFpEF.     Pertinent studies:     [facility] 10/2020: RA 15/20/15, RV 51/21, PA 50/17/29, PCW 21/20/16, CO/CI 7.56/3.61, /66/89;     EKG 12/30: NSR 69, RBBB  TTE 12/31: preserved EF, Stage II diastolic dysfxn, mild-mod AS (valve area 1.4), severe pulm HTN (PASP 68.4), mild AR/MR, severe TR and e?L-R shunt from ?AV-RV fistula    71 yo female with a PMHx of CAD, HFpEF (EF 65-60%), severe MR s/p mechanical MVR 1999 (Dr. Anderson, [facility]), severe TR s/p mechanical TVR (2012 [facility]), afib s/p ablation, HTN, HLD, CKD (bl 1.35-1.5), COPD on 3L NC at home, MIGUEL who presented to the [facility] for cp, sob, dizziness x 1 week, found to be in decompensated HFpEF, w/ tachy-ofelia rhythm. Seen by Dr. Thrasher Cardiology at [facility]. s/p IV diuresis w/ lasix BID. TTE 12/31 reviewed by [facility] Dr. Fregoso, c/f possible L-R shunt, ?fistula between aortic valve and RV. Patient transferred to [facility] for ELIZABETH and escalation of care. HF consulted for further mgt of HFpEF.     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

## 2021-01-06 NOTE — CONSULT NOTE ADULT - ASSESSMENT
71 yo female with a PMHx of Morbid Obesity, CAD, HFpEF (EF 65-60%), severe MR s/p mechanical MVR 1999 (Dr. Anderson, [facility]), severe TR s/p mechanical TVR (2012 [facility]), afib s/p ablation, HTN, HLD, CKD (bl 1.35-1.5), COPD on 3L NC at home, MIGUEL who presented to the [facility] for cp, sob, dizziness x 1 week, found to be in decompensated HFpEF, w/ tachy-ofelia rhythm  s/p epistaxis with control of bleeding with nasal packing  Known to have anemia with prior history of GIB 12/2019 - too high risk for endoscopic procedures at that time.  Currently without overt/brisk GI bleed    RECS:  -Heart Failure following  -AC per primary team  -no role for invasive GI evaluation at this time  -add PPI for GI prophylaxis  -monitor CBC and BMs (though given significant recent epistaxis, stool may be black from swallowed blood)      Further care per primary team  Discussed with Medicine attending    Thank you for the courtesy of this consult.    Pan Lawson PA-C    [facility]  [phone]  After hours and weekend coverage [phone]

## 2021-01-06 NOTE — CONSULT NOTE ADULT - SUBJECTIVE AND OBJECTIVE BOX
**************************************************  Heart Failure Consult Note  Dr. Kavita Mckeon, PGY2  Resident, HF Service   NS Pager [phone]  **************************************************    HPI:     69 yo female with a PMHx of CAD, HFpEF (EF 65-60%), severe MR s/p mechanical MVR 1999 (Dr. Anderson, [facility]), severe TR s/p ?mechanical TVR (2012 [facility]), afib s/p ablation, HTN, HLD, CKD (bl 1.35-1.5), COPD, MIGUEL who presented to the [facility] for cp, sob, dizziness, found to be in decompensated HFpEF and tachy-ofelia at times. Seen by Dr. Thrasher Cardiology at [facility]. s/p IV diuresis w/ lasix BID. TTE 12/31 showing preserved EF, Stage II diastolic dysfxn, mild-mod AS (valve area 1.4), severe pulm HTN (PASP 68.4). On review of TTE by [facility] Dr. Fregoso, there was concern for possible fistula between aortic valve and RV.     Patient transferred to [facility] for ELIZABETH and escalation of care. HF consulted for further mgt of HFpEF.       PAST MEDICAL & SURGICAL HISTORY:  COPD (chronic obstructive pulmonary disease)    Hypertension    CHF (congestive heart failure)    Tricuspid valve replaced    Mitral valve replaced    Gout    HTN (hypertension)    CHF (congestive heart failure)    COPD (chronic obstructive pulmonary disease)  on home O2    MIGUEL (obstructive sleep apnea)    Pulmonary hypertension    Atrial fibrillation  S/P Ablation    No significant past surgical history    Tricuspid valve replaced  mechanical    History of mitral valve replacement with mechanical valve        REVIEW OF SYSTEMS      Gen    MEDICATIONS  (STANDING):  allopurinol 100 milliGRAM(s) Oral daily  budesonide 160 MICROgram(s)/formoterol 4.5 MICROgram(s) Inhaler 2 Puff(s) Inhalation two times a day  furosemide   Injectable 60 milliGRAM(s) IV Push two times a day  heparin  Infusion. 1400 Unit(s)/Hr (14 mL/Hr) IV Continuous <Continuous>  metoprolol succinate ER 25 milliGRAM(s) Oral daily  simvastatin 10 milliGRAM(s) Oral at bedtime    MEDICATIONS  (PRN):  heparin   Injectable 9000 Unit(s) IV Push every 6 hours PRN For aPTT less than 40  heparin   Injectable 4000 Unit(s) IV Push every 6 hours PRN For aPTT between 40 - 57      Allergies    No Known Allergies    Intolerances        SOCIAL HISTORY:    FAMILY HISTORY:  Family history of hypertension (Mother)    Family history of stroke    Family history of hypertension (Father, Sibling)      Vital Signs Last 24 Hrs  T(C): 37.1 (06 Jan 2021 04:45), Max: 37.1 (06 Jan 2021 04:45)  T(F): 98.8 (06 Jan 2021 04:45), Max: 98.8 (06 Jan 2021 04:45)  HR: 74 (06 Jan 2021 04:45) (63 - 90)  BP: 122/70 (06 Jan 2021 04:45) (115/60 - 166/83)  BP(mean): --  RR: 20 (06 Jan 2021 04:45) (18 - 21)  SpO2: 94% (06 Jan 2021 04:45) (94% - 100%)  I&O's Summary    06 Jan 2021 07:01  -  06 Jan 2021 11:53  --------------------------------------------------------  IN: 0 mL / OUT: 650 mL / NET: -650 mL        LABS:                        8.0    8.40  )-----------( 364      ( 06 Jan 2021 08:59 )             28.3     01-06    137  |  95<L>  |  46<H>  ----------------------------<  104<H>  4.3   |  32<H>  |  1.50<H>    Ca    10.1      06 Jan 2021 08:58      PT/INR - ( 06 Jan 2021 09:00 )   PT: 24.9 sec;   INR: 2.15 ratio         PTT - ( 06 Jan 2021 09:00 )  PTT:52.8 sec      RADIOLOGY & ADDITIONAL STUDIES: **************************************************  Heart Failure Consult Note  Dr. Kavita Mckeon, PGY2  Resident, HF Service   NS Pager [phone]  **************************************************    HPI:     71 yo female with a PMHx of CAD, HFpEF (EF 65-60%), severe MR s/p mechanical MVR 1999 (Dr. Anderson, [facility]), severe TR s/p mechanical TVR (2012 [facility]), afib s/p ablation, HTN, HLD, CKD (bl 1.35-1.5), COPD (on 3L NC), MIGUEL non compliant on cpap who presented to the [facility] for cp, sob, dizziness, found to be in decompensated HFpEF and tachy-ofelia at times. Seen by Dr. Thrasher Cardiology at [facility]. s/p IV diuresis w/ lasix BID. TTE 12/31 showing preserved EF, Stage II diastolic dysfxn, mild-mod AS (valve area 1.4), severe pulm HTN (PASP 68.4). On review of TTE by [facility] Dr. Fregoso, there was concern for possible fistula between aortic valve and RV.     Patient transferred to [email or web address] for ELIZABETH and escalation of care. HF consulted for further mgt of HFpEF.       PAST MEDICAL & SURGICAL HISTORY:  COPD (chronic obstructive pulmonary disease)    Hypertension    CHF (congestive heart failure)    Tricuspid valve replaced    Mitral valve replaced    Gout    HTN (hypertension)    CHF (congestive heart failure)    COPD (chronic obstructive pulmonary disease)  on home O2    MIGUEL (obstructive sleep apnea)    Pulmonary hypertension    Atrial fibrillation  S/P Ablation    No significant past surgical history    Tricuspid valve replaced  mechanical    History of mitral valve replacement with mechanical valve        REVIEW OF SYSTEMS      Gen    MEDICATIONS  (STANDING):  allopurinol 100 milliGRAM(s) Oral daily  budesonide 160 MICROgram(s)/formoterol 4.5 MICROgram(s) Inhaler 2 Puff(s) Inhalation two times a day  furosemide   Injectable 60 milliGRAM(s) IV Push two times a day  heparin  Infusion. 1400 Unit(s)/Hr (14 mL/Hr) IV Continuous <Continuous>  metoprolol succinate ER 25 milliGRAM(s) Oral daily  simvastatin 10 milliGRAM(s) Oral at bedtime    MEDICATIONS  (PRN):  heparin   Injectable 9000 Unit(s) IV Push every 6 hours PRN For aPTT less than 40  heparin   Injectable 4000 Unit(s) IV Push every 6 hours PRN For aPTT between 40 - 57      Allergies    No Known Allergies    Intolerances        SOCIAL HISTORY:    FAMILY HISTORY:  Family history of hypertension (Mother)    Family history of stroke    Family history of hypertension (Father, Sibling)      Vital Signs Last 24 Hrs  T(C): 37.1 (06 Jan 2021 04:45), Max: 37.1 (06 Jan 2021 04:45)  T(F): 98.8 (06 Jan 2021 04:45), Max: 98.8 (06 Jan 2021 04:45)  HR: 74 (06 Jan 2021 04:45) (63 - 90)  BP: 122/70 (06 Jan 2021 04:45) (115/60 - 166/83)  BP(mean): --  RR: 20 (06 Jan 2021 04:45) (18 - 21)  SpO2: 94% (06 Jan 2021 04:45) (94% - 100%)  I&O's Summary    06 Jan 2021 07:01  -  06 Jan 2021 11:53  --------------------------------------------------------  IN: 0 mL / OUT: 650 mL / NET: -650 mL        LABS:                        8.0    8.40  )-----------( 364      ( 06 Jan 2021 08:59 )             28.3     01-06    137  |  95<L>  |  46<H>  ----------------------------<  104<H>  4.3   |  32<H>  |  1.50<H>    Ca    10.1      06 Jan 2021 08:58      PT/INR - ( 06 Jan 2021 09:00 )   PT: 24.9 sec;   INR: 2.15 ratio         PTT - ( 06 Jan 2021 09:00 )  PTT:52.8 sec      RADIOLOGY & ADDITIONAL STUDIES: **************************************************  Heart Failure Consult Note  Dr. Kavita Mckeon, PGY2  Resident, HF Service   NS Pager [phone]  **************************************************    HPI:     69 yo female with a PMHx of CAD, HFpEF (EF 65-60%), severe MR s/p mechanical MVR 1999 (Dr. Anderson, [facility]), severe TR s/p mechanical TVR (2012 [facility]), afib s/p ablation, HTN, HLD, CKD (bl 1.35-1.5), COPD (on 3L NC), MIGUEL (previously on bipap) who presented to the [facility] for cp, sob, dizziness, x 1 week found to be in decompensated HFpEF and tachy-ofelia at times. Seen by Dr. Thrasher Cardiology at [facility]. s/p IV diuresis w/ lasix BID. TTE 12/31 showing preserved EF, Stage II diastolic dysfxn, mild-mod AS (valve area 1.4), severe pulm HTN (PASP 68.4). On review of TTE by [facility] Dr. Fregoso, there was concern for possible fistula between aortic valve and RV.     Patient transferred to [facility] for ELIZABETH and escalation of care. HF consulted for further mgt of HFpEF.       PAST MEDICAL & SURGICAL HISTORY:  COPD (chronic obstructive pulmonary disease)    Hypertension    CHF (congestive heart failure)    Tricuspid valve replaced    Mitral valve replaced    Gout    HTN (hypertension)    CHF (congestive heart failure)    COPD (chronic obstructive pulmonary disease)  on home O2    MIGUEL (obstructive sleep apnea)    Pulmonary hypertension    Atrial fibrillation  S/P Ablation    No significant past surgical history    Tricuspid valve replaced  mechanical    History of mitral valve replacement with mechanical valve        REVIEW OF SYSTEMS      Gen    MEDICATIONS  (STANDING):  allopurinol 100 milliGRAM(s) Oral daily  budesonide 160 MICROgram(s)/formoterol 4.5 MICROgram(s) Inhaler 2 Puff(s) Inhalation two times a day  furosemide   Injectable 60 milliGRAM(s) IV Push two times a day  heparin  Infusion. 1400 Unit(s)/Hr (14 mL/Hr) IV Continuous <Continuous>  metoprolol succinate ER 25 milliGRAM(s) Oral daily  simvastatin 10 milliGRAM(s) Oral at bedtime    MEDICATIONS  (PRN):  heparin   Injectable 9000 Unit(s) IV Push every 6 hours PRN For aPTT less than 40  heparin   Injectable 4000 Unit(s) IV Push every 6 hours PRN For aPTT between 40 - 57      Allergies    No Known Allergies    Intolerances        SOCIAL HISTORY:    FAMILY HISTORY:  Family history of hypertension (Mother)    Family history of stroke    Family history of hypertension (Father, Sibling)      Vital Signs Last 24 Hrs  T(C): 37.1 (06 Jan 2021 04:45), Max: 37.1 (06 Jan 2021 04:45)  T(F): 98.8 (06 Jan 2021 04:45), Max: 98.8 (06 Jan 2021 04:45)  HR: 74 (06 Jan 2021 04:45) (63 - 90)  BP: 122/70 (06 Jan 2021 04:45) (115/60 - 166/83)  BP(mean): --  RR: 20 (06 Jan 2021 04:45) (18 - 21)  SpO2: 94% (06 Jan 2021 04:45) (94% - 100%)  I&O's Summary    06 Jan 2021 07:01  -  06 Jan 2021 11:53  --------------------------------------------------------  IN: 0 mL / OUT: 650 mL / NET: -650 mL        LABS:                        8.0    8.40  )-----------( 364      ( 06 Jan 2021 08:59 )             28.3     01-06    137  |  95<L>  |  46<H>  ----------------------------<  104<H>  4.3   |  32<H>  |  1.50<H>    Ca    10.1      06 Jan 2021 08:58      PT/INR - ( 06 Jan 2021 09:00 )   PT: 24.9 sec;   INR: 2.15 ratio         PTT - ( 06 Jan 2021 09:00 )  PTT:52.8 sec      RADIOLOGY & ADDITIONAL STUDIES: **************************************************  Heart Failure Consult Note  Dr. Kavita Mckeon, PGY2  Resident, HF Service   NS Pager [phone]  **************************************************    HPI:     69 yo female with a PMHx of CAD, HFpEF (EF 65-60%), severe MR s/p mechanical MVR 1999 (Dr. Anderson, [facility]), severe TR s/p mechanical TVR (2012 [facility]), afib s/p ablation, HTN, HLD, CKD (bl 1.35-1.5), COPD (on 3L NC), MIGUEL (previously on bipap) who presented to the [facility] for cp, sob, dizziness, x 1 week found to be in decompensated HFpEF and tachy-ofelia at times. Seen by Dr. Thrasher Cardiology at [facility]. s/p IV diuresis w/ lasix BID. TTE 12/31 showing preserved EF, Stage II diastolic dysfxn, mild-mod AS (valve area 1.4), severe pulm HTN (PASP 68.4). On review of TTE by [facility] Dr. Fregoso, there was concern for possible fistula between aortic valve and RV.     Patient transferred to [facility] for ELIZABETH and escalation of care. HF consulted for further mgt of HFpEF.     Patient seen and examined at bedside. Endorses SOB, orthopnea, LE edema, chest palpitations. No further light headedness.     Tele: 70-120s (50s currently)       PAST MEDICAL & SURGICAL HISTORY:  COPD (chronic obstructive pulmonary disease)    Hypertension    CHF (congestive heart failure)    Tricuspid valve replaced    Mitral valve replaced    Gout    HTN (hypertension)    CHF (congestive heart failure)    COPD (chronic obstructive pulmonary disease)  on home O2    MIGUEL (obstructive sleep apnea)    Pulmonary hypertension    Atrial fibrillation  S/P Ablation    No significant past surgical history    Tricuspid valve replaced  mechanical    History of mitral valve replacement with mechanical valve        REVIEW OF SYSTEMS      Gen    MEDICATIONS  (STANDING):  allopurinol 100 milliGRAM(s) Oral daily  budesonide 160 MICROgram(s)/formoterol 4.5 MICROgram(s) Inhaler 2 Puff(s) Inhalation two times a day  furosemide   Injectable 60 milliGRAM(s) IV Push two times a day  heparin  Infusion. 1400 Unit(s)/Hr (14 mL/Hr) IV Continuous <Continuous>  metoprolol succinate ER 25 milliGRAM(s) Oral daily  simvastatin 10 milliGRAM(s) Oral at bedtime    MEDICATIONS  (PRN):  heparin   Injectable 9000 Unit(s) IV Push every 6 hours PRN For aPTT less than 40  heparin   Injectable 4000 Unit(s) IV Push every 6 hours PRN For aPTT between 40 - 57      Allergies    No Known Allergies    Intolerances        SOCIAL HISTORY:    FAMILY HISTORY:  Family history of hypertension (Mother)    Family history of stroke    Family history of hypertension (Father, Sibling)      Vital Signs Last 24 Hrs  T(C): 37.1 (06 Jan 2021 04:45), Max: 37.1 (06 Jan 2021 04:45)  T(F): 98.8 (06 Jan 2021 04:45), Max: 98.8 (06 Jan 2021 04:45)  HR: 74 (06 Jan 2021 04:45) (63 - 90)  BP: 122/70 (06 Jan 2021 04:45) (115/60 - 166/83)  BP(mean): --  RR: 20 (06 Jan 2021 04:45) (18 - 21)  SpO2: 94% (06 Jan 2021 04:45) (94% - 100%)  I&O's Summary    06 Jan 2021 07:01  -  06 Jan 2021 11:53  --------------------------------------------------------  IN: 0 mL / OUT: 650 mL / NET: -650 mL        LABS:                        8.0    8.40  )-----------( 364      ( 06 Jan 2021 08:59 )             28.3     01-06    137  |  95<L>  |  46<H>  ----------------------------<  104<H>  4.3   |  32<H>  |  1.50<H>    Ca    10.1      06 Jan 2021 08:58      PT/INR - ( 06 Jan 2021 09:00 )   PT: 24.9 sec;   INR: 2.15 ratio         PTT - ( 06 Jan 2021 09:00 )  PTT:52.8 sec      RADIOLOGY & ADDITIONAL STUDIES: **************************************************  Heart Failure Consult Note  Dr. Kavita Mckeon, PGY2  Resident, HF Service   NS Pager [phone]  **************************************************    HPI:     71 yo female with a PMHx of CAD, HFpEF (EF 65-60%), severe MR s/p mechanical MVR 1999 (Dr. Anderson, [facility]), severe TR s/p mechanical TVR (2012 [facility]), afib s/p ablation, HTN, HLD, CKD (bl 1.35-1.5), COPD (on 3L NC), MIGUEL (previously on bipap) who presented to the [facility] for cp, sob, dizziness, x 1 week found to be in decompensated HFpEF and tachy-ofelia at times. Seen by Dr. Thrasher Cardiology at [facility]. s/p IV diuresis w/ lasix BID. TTE 12/31 showing preserved EF, Stage II diastolic dysfxn, mild-mod AS (valve area 1.4), severe pulm HTN (PASP 68.4). On review of TTE by [facility] Dr. Fregoso, there was concern for possible fistula between aortic valve and RV.     Patient transferred to [facility] for ELIZABETH and escalation of care. HF consulted for further mgt of HFpEF.     Patient seen and examined at bedside. Endorses SOB, orthopnea, LE edema, chest palpitations. No further light headedness.     Tele: 70-120s (50s currently)       PAST MEDICAL & SURGICAL HISTORY:  COPD (chronic obstructive pulmonary disease)    Hypertension    CHF (congestive heart failure)    Tricuspid valve replaced    Mitral valve replaced    Gout    HTN (hypertension)    CHF (congestive heart failure)    COPD (chronic obstructive pulmonary disease)  on home O2    MIGUEL (obstructive sleep apnea)    Pulmonary hypertension    Atrial fibrillation  S/P Ablation    No significant past surgical history    Tricuspid valve replaced  mechanical    History of mitral valve replacement with mechanical valve        REVIEW OF SYSTEMS      Gen    MEDICATIONS  (STANDING):  allopurinol 100 milliGRAM(s) Oral daily  budesonide 160 MICROgram(s)/formoterol 4.5 MICROgram(s) Inhaler 2 Puff(s) Inhalation two times a day  furosemide   Injectable 60 milliGRAM(s) IV Push two times a day  heparin  Infusion. 1400 Unit(s)/Hr (14 mL/Hr) IV Continuous <Continuous>  metoprolol succinate ER 25 milliGRAM(s) Oral daily  simvastatin 10 milliGRAM(s) Oral at bedtime    MEDICATIONS  (PRN):  heparin   Injectable 9000 Unit(s) IV Push every 6 hours PRN For aPTT less than 40  heparin   Injectable 4000 Unit(s) IV Push every 6 hours PRN For aPTT between 40 - 57      Allergies    No Known Allergies    Intolerances        SOCIAL HISTORY:    FAMILY HISTORY:  Family history of hypertension (Mother)    Family history of stroke    Family history of hypertension (Father, Sibling)      Vital Signs Last 24 Hrs  T(C): 37.1 (06 Jan 2021 12:25), Max: 37.1 (06 Jan 2021 04:45)  T(F): 98.7 (06 Jan 2021 12:25), Max: 98.8 (06 Jan 2021 04:45)  HR: 78 (06 Jan 2021 12:25) (63 - 90)  BP: 136/64 (06 Jan 2021 12:25) (115/60 - 166/83)  BP(mean): --  RR: 19 (06 Jan 2021 12:25) (18 - 21)  SpO2: 96% (06 Jan 2021 12:25) (94% - 100%)    General: elderly black female in NAD on 3L NC   HEENT: NCAT. No scleral icterus or injection.  Moist MM.    Heart: RRR.  Irregularly irregular rhythm.  Murmur of mechanical valve. No rubs, gallops. JVD at tragus w/ +HJR  Lungs: dry bb crackles, no wheezing, rhonchi   Abdomen: BS+, soft, NT/ND.  No organomegaly.  Skin: Warm and dry   Extremities: 1+ edema bl up to level of knees   Neuro: A&Ox3       LABS:                        8.0    8.40  )-----------( 364      ( 06 Jan 2021 08:59 )             28.3     01-06    137  |  95<L>  |  46<H>  ----------------------------<  104<H>  4.3   |  32<H>  |  1.50<H>    Ca    10.1      06 Jan 2021 08:58      PT/INR - ( 06 Jan 2021 09:00 )   PT: 24.9 sec;   INR: 2.15 ratio         PTT - ( 06 Jan 2021 09:00 )  PTT:52.8 sec      RADIOLOGY & ADDITIONAL STUDIES:   **************************************************  Heart Failure Consult Note  Dr. Kavita Mckeon, PGY2  Resident, HF Service   NS Pager [phone]  **************************************************    HPI:     71 yo female with a PMHx of CAD, HFpEF (EF 65-60%), severe MR s/p mechanical MVR 1999 (Dr. Anderson, [facility]), severe TR s/p mechanical TVR (2012 [facility]), afib s/p ablation, HTN, HLD, CKD (bl 1.35-1.5), COPD (on 3L NC), MIGUEL (previously on bipap) who presented to the [facility] for cp, sob, dizziness, x 1 week found to be in decompensated HFpEF and tachy-ofelia at times. Seen by Dr. Thrasher Cardiology at [facility]. s/p IV diuresis w/ lasix BID. TTE 12/31 showing preserved EF, Stage II diastolic dysfxn, mild-mod AS (valve area 1.4), severe pulm HTN (PASP 68.4). On review of TTE by [facility] Dr. Fregoso, there was concern for possible fistula between aortic valve and RV.     Patient transferred to [facility] for ELIZABETH and escalation of care. HF consulted for further mgt of HFpEF.     Patient seen and examined at bedside. Endorses SOB, orthopnea, LE edema, chest palpitations. No further light headedness.     Tele: 70-120s (50s currently)       PAST MEDICAL & SURGICAL HISTORY:  COPD (chronic obstructive pulmonary disease)    Hypertension    CHF (congestive heart failure)    Tricuspid valve replaced    Mitral valve replaced    Gout    HTN (hypertension)    CHF (congestive heart failure)    COPD (chronic obstructive pulmonary disease)  on home O2    MIGUEL (obstructive sleep apnea)    Pulmonary hypertension    Atrial fibrillation  S/P Ablation    No significant past surgical history    Tricuspid valve replaced  mechanical    History of mitral valve replacement with mechanical valve        REVIEW OF SYSTEMS      Gen    MEDICATIONS  (STANDING):  allopurinol 100 milliGRAM(s) Oral daily  budesonide 160 MICROgram(s)/formoterol 4.5 MICROgram(s) Inhaler 2 Puff(s) Inhalation two times a day  furosemide   Injectable 60 milliGRAM(s) IV Push two times a day  heparin  Infusion. 1400 Unit(s)/Hr (14 mL/Hr) IV Continuous <Continuous>  metoprolol succinate ER 25 milliGRAM(s) Oral daily  simvastatin 10 milliGRAM(s) Oral at bedtime    MEDICATIONS  (PRN):  heparin   Injectable 9000 Unit(s) IV Push every 6 hours PRN For aPTT less than 40  heparin   Injectable 4000 Unit(s) IV Push every 6 hours PRN For aPTT between 40 - 57      Allergies    No Known Allergies    Intolerances        SOCIAL HISTORY:    FAMILY HISTORY:  Family history of hypertension (Mother)    Family history of stroke    Family history of hypertension (Father, Sibling)      Vital Signs Last 24 Hrs  T(C): 37.1 (06 Jan 2021 12:25), Max: 37.1 (06 Jan 2021 04:45)  T(F): 98.7 (06 Jan 2021 12:25), Max: 98.8 (06 Jan 2021 04:45)  HR: 78 (06 Jan 2021 12:25) (63 - 90)  BP: 136/64 (06 Jan 2021 12:25) (115/60 - 166/83)  BP(mean): --  RR: 19 (06 Jan 2021 12:25) (18 - 21)  SpO2: 96% (06 Jan 2021 12:25) (94% - 100%)    General: elderly black female in NAD on 3L NC   HEENT: NCAT. No scleral icterus or injection.  Moist MM.    Heart: RRR.  Irregularly irregular rhythm.  Murmur of mechanical valve. No rubs, gallops. JVD 12cm, +HJR  Lungs: dry bb crackles, no wheezing, rhonchi   Abdomen: BS+, soft, NT/ND.  No organomegaly.  Skin: Warm and dry   Extremities: 1+ edema bl up to level of knees   Neuro: A&Ox3       LABS:                        8.0    8.40  )-----------( 364      ( 06 Jan 2021 08:59 )             28.3     01-06    137  |  95<L>  |  46<H>  ----------------------------<  104<H>  4.3   |  32<H>  |  1.50<H>    Ca    10.1      06 Jan 2021 08:58      PT/INR - ( 06 Jan 2021 09:00 )   PT: 24.9 sec;   INR: 2.15 ratio         PTT - ( 06 Jan 2021 09:00 )  PTT:52.8 sec      RADIOLOGY & ADDITIONAL STUDIES:   **************************************************  Heart Failure Consult Note  Dr. Kavita Mckeon, PGY2  Resident, HF Service   NS Pager [phone]  **************************************************    HPI:     69 yo female with a PMHx of CAD, HFpEF (EF 65-60%), severe MR s/p mechanical MVR 1999 (Dr. Anderson, [facility]), severe TR s/p mechanical TVR (2012 [facility]), afib s/p ablation, HTN, HLD, CKD (bl 1.35-1.5), COPD (on 3L NC), MIGUEL (previously on bipap) who presented to the [facility] for cp, sob, dizziness, x 1 week found to be in decompensated HFpEF and tachy-ofelia at times. Seen by Dr. Thrasher Cardiology at [facility]. s/p IV diuresis w/ lasix BID. TTE 12/31 showing preserved EF, Stage II diastolic dysfxn, mild-mod AS (valve area 1.4), severe pulm HTN (PASP 68.4). On review of TTE by [facility] Dr. Escobar, there was concern for possible L-R shunt, ?aortic valve to RV fistula.     Patient transferred to [facility] for ELIZABETH and escalation of care. HF consulted for further mgt of HFpEF.     Patient seen and examined at bedside. Endorses SOB, orthopnea, LE edema, chest palpitations. No further light headedness. Tele: 70-150s (50s currently).       PAST MEDICAL & SURGICAL HISTORY:  COPD (chronic obstructive pulmonary disease)    Hypertension    CHF (congestive heart failure)    Tricuspid valve replaced    Mitral valve replaced    Gout    HTN (hypertension)    CHF (congestive heart failure)    COPD (chronic obstructive pulmonary disease)  on home O2    MIGUEL (obstructive sleep apnea)    Pulmonary hypertension    Atrial fibrillation  S/P Ablation    No significant past surgical history    Tricuspid valve replaced  mechanical    History of mitral valve replacement with mechanical valve        REVIEW OF SYSTEMS      Gen    MEDICATIONS  (STANDING):  allopurinol 100 milliGRAM(s) Oral daily  budesonide 160 MICROgram(s)/formoterol 4.5 MICROgram(s) Inhaler 2 Puff(s) Inhalation two times a day  furosemide   Injectable 60 milliGRAM(s) IV Push two times a day  heparin  Infusion. 1400 Unit(s)/Hr (14 mL/Hr) IV Continuous <Continuous>  metoprolol succinate ER 25 milliGRAM(s) Oral daily  simvastatin 10 milliGRAM(s) Oral at bedtime    MEDICATIONS  (PRN):  heparin   Injectable 9000 Unit(s) IV Push every 6 hours PRN For aPTT less than 40  heparin   Injectable 4000 Unit(s) IV Push every 6 hours PRN For aPTT between 40 - 57      Allergies    No Known Allergies    Intolerances        SOCIAL HISTORY:    FAMILY HISTORY:  Family history of hypertension (Mother)    Family history of stroke    Family history of hypertension (Father, Sibling)      Vital Signs Last 24 Hrs  T(C): 37.1 (06 Jan 2021 12:25), Max: 37.1 (06 Jan 2021 04:45)  T(F): 98.7 (06 Jan 2021 12:25), Max: 98.8 (06 Jan 2021 04:45)  HR: 78 (06 Jan 2021 12:25) (63 - 90)  BP: 136/64 (06 Jan 2021 12:25) (115/60 - 166/83)  BP(mean): --  RR: 19 (06 Jan 2021 12:25) (18 - 21)  SpO2: 96% (06 Jan 2021 12:25) (94% - 100%)    General: elderly black female in NAD on 3L NC   HEENT: NCAT. No scleral icterus or injection.  Moist MM.    Heart: RRR.  Irregularly irregular rhythm.  Murmur of mechanical valve. No rubs, gallops. JVD 12cm, +HJR  Lungs: dry bb crackles, no wheezing, rhonchi   Abdomen: BS+, soft, NT/ND.  No organomegaly.  Skin: Warm and dry   Extremities: 1+ edema bl up to level of knees   Neuro: A&Ox3       LABS:                        8.0    8.40  )-----------( 364      ( 06 Jan 2021 08:59 )             28.3     01-06    137  |  95<L>  |  46<H>  ----------------------------<  104<H>  4.3   |  32<H>  |  1.50<H>    Ca    10.1      06 Jan 2021 08:58      PT/INR - ( 06 Jan 2021 09:00 )   PT: 24.9 sec;   INR: 2.15 ratio         PTT - ( 06 Jan 2021 09:00 )  PTT:52.8 sec      RADIOLOGY & ADDITIONAL STUDIES:

## 2021-01-06 NOTE — H&P ADULT - HISTORY OF PRESENT ILLNESS
70 years      h/o   CAD,  mechanical MVR and Tricuspid valve repair,        h/o  chronic   HFpEF,   HTN  . MIGUEL ,     severe MR  ,  s/p mechanical MVR 1999 , [facility] with Dr. Anderson,  and severe TR s/p TVR 2012 ([facility]),       COPD ,on home 02; no prior tobacco use      AFib on Coumadin , failed  ablation,   ,  PHTN,   CKD 3 (b/l SCr ~1.3-1.6),  MIGUEL and gout.     h/o   hypercarbia and she reports  needing Bipap,in the past,  but hasn't used in 10 years.      Morbid  obesity,  BMI is 41     presented with  CP,  dizziness and sob   to [facility]/ samuel valle, for sob/ chf   She also had epistaxis requiring packed red blood cell support.  and  pe r card . pt waS  transferred for higher level of care/ ELIZABETH  pt on 3  dsu now

## 2021-01-06 NOTE — PHYSICAL THERAPY INITIAL EVALUATION ADULT - GAIT TRAINING, PT EVAL
GOAL: Pt will ambulate 150 feet with least restrictive device as appropriate independently within 2 weeks

## 2021-01-06 NOTE — PHYSICAL THERAPY INITIAL EVALUATION ADULT - DISCHARGE DISPOSITION, PT EVAL
anticipate home with home PT for further therex and balance training. pt reports owning RW for ambulation. assist/supervision as needed from family. DOMINICK lomas.

## 2021-01-06 NOTE — CONSULT NOTE ADULT - SUBJECTIVE AND OBJECTIVE BOX
Patient is a 70y old  Female who presented with a chief complaint of chf/ chf team (06 Jan 2021 11:52)      HPI:  70 years h/o   CAD,  mechanical MVR and Tricuspid valve repair, h/o  chronic   HFpEF,   HTN  . MIGUEL ,  severe MR  ,  s/p mechanical MVR 1999 , [facility] with Dr. Anderson,  and severe TR s/p TVR 2012 ([facility]),   COPD ,on home 02; no prior tobacco use  AFib on Coumadin , failed  ablation,   ,  PHTN,   CKD 3 (b/l SCr ~1.3-1.6),  MIGUEL and gout.  h/o   hypercarbia and she reports  needing Bipap,in the past,  but hasn't used in 10 years.  Morbid  obesity,  BMI is 41    presented with  CP,  dizziness and sob   to [facility]/ smauel valle, for sob/ chf  She also had epistaxis requiring packed red blood cell support.  and  per card . pt waS  transferred for higher level of care/ ELIZABETH  pt on 3  dsu now (06 Jan 2021 09:09)    Noted to be anemic - no melena, hemetemesis or rectal bleeding. no abdominal pain, nausea or vomiting  +SOB  last colonoscopy >10 years ago +FH GI malignancy -Mother with Hx Colon CA  Prior history of GI bleed / melena 12/2019 - managed medically as pt was found to be very high risk for invasive procedure    No tobacco, ETOH or NSAIDs use  subtherapeutic INR on AC with Heparin gtt for hx Mechanical valve    PAST MEDICAL & SURGICAL HISTORY:  COPD (chronic obstructive pulmonary disease)    Hypertension    CHF (congestive heart failure)    Tricuspid valve replaced    Mitral valve replaced    Gout    HTN (hypertension)    CHF (congestive heart failure)    COPD (chronic obstructive pulmonary disease)  on home O2    MIGUEL (obstructive sleep apnea)    Pulmonary hypertension    Atrial fibrillation  S/P Ablation    No significant past surgical history    Tricuspid valve replaced  mechanical    History of mitral valve replacement with mechanical valve      Allergies  No Known Allergies      MEDICATIONS  (STANDING):  allopurinol 100 milliGRAM(s) Oral daily  budesonide 160 MICROgram(s)/formoterol 4.5 MICROgram(s) Inhaler 2 Puff(s) Inhalation two times a day  furosemide   Injectable 60 milliGRAM(s) IV Push two times a day  heparin  Infusion. 1400 Unit(s)/Hr (14 mL/Hr) IV Continuous <Continuous>  metoprolol succinate ER 25 milliGRAM(s) Oral daily  simvastatin 10 milliGRAM(s) Oral at bedtime    MEDICATIONS  (PRN):  heparin   Injectable 9000 Unit(s) IV Push every 6 hours PRN For aPTT less than 40  heparin   Injectable 4000 Unit(s) IV Push every 6 hours PRN For aPTT between 40 - 57      Social History:  no tobacco, ETOH use    Family History   IBD (  ) Yes   ( X ) No  GI Malignancy ( X )  Yes - mother Colon CA    (  ) No      Advanced Directives: (  X   ) None    (      ) DNR    (     ) DNI    (     ) Health Care Proxy:     Review of Systems:    General:  No wt loss, fevers, chills, night sweats  CV:  No current pain, see HPI  Resp:  No dyspnea, cough, tachypnea,  +SOB +COPD - on home O2  GI:  No pain, No nausea, No vomiting, No diarrhea, No constipation, No weight loss, No fever, No pruritis, No rectal bleeding, No tarry stools, No dysphagia,  :  No pain, bleeding, incontinence, nocturia  Muscle:  No pain, weakness  Neuro:  No focal weakness, tingling, memory problems  Psych:  No fatigue, insomnia, mood problems, depression  Endocrine:  No polyuria, polydypsia, cold/heat intolerance  Heme:  No petechiae, ecchymosis, easy bruisability  +AC +epistaxis  Skin:  No rash, tattoos, scars, edema      Vital Signs Last 24 Hrs  T(C): 37.1 (06 Jan 2021 12:25), Max: 37.1 (06 Jan 2021 04:45)  T(F): 98.7 (06 Jan 2021 12:25), Max: 98.8 (06 Jan 2021 04:45)  HR: 78 (06 Jan 2021 12:25) (63 - 90)  BP: 136/64 (06 Jan 2021 12:25) (115/60 - 166/83)  BP(mean): --  RR: 19 (06 Jan 2021 12:25) (18 - 21)  SpO2: 96% (06 Jan 2021 12:25) (94% - 100%)    PHYSICAL EXAM:    Constitutional: NAD, morbidly obese AA female  +nasal packing in right nare, on supplemental oxygen OOB to chair  Neck: No LAD, supple +JVD  Respiratory: basilar rales, no wheeze  Cardiovascular: S1 and S2, RRR,  +mechanical click  Gastrointestinal: BS+, obese soft, NT/ND  Extremities: +edema b/l  neg clubbing, cyanosis  Vascular: 2+ peripheral pulses  Neurological: A/O x 3, no focal deficits  Psychiatric: Normal mood, normal affect  Skin: No rashes, anicteric        LABS:                        8.0    8.40  )-----------( 364      ( 06 Jan 2021 08:59 )             28.3     01-06    137  |  95<L>  |  46<H>  ----------------------------<  104<H>  4.3   |  32<H>  |  1.50<H>    Ca    10.1      06 Jan 2021 08:58      PT/INR - ( 06 Jan 2021 09:00 )   PT: 24.9 sec;   INR: 2.15 ratio    PTT - ( 06 Jan 2021 09:00 )  PTT:52.8 sec    LIVER FUNCTIONS - ( 03 Jan 2021 07:23 )  Alb: 3.1 g/dL / Pro: 8.6 gm/dL / ALK PHOS: 105 U/L / ALT: 16 U/L / AST: 18 U/L / GGT: x           Iron with Total Binding Capacity (01.05.21 @ 15:07)   % Saturation, Iron: 8 %   Iron - Total Binding Capacity.: 374 ug/dL   Iron Total, Serum: 31 ug/dL   Unsaturated Iron Binding Capacity: 343 ug/dL   Ferritin, Serum: 92 ng/m        PROCEDURE DATE:  12/31/2020      INTERPRETATION:  REPORT:  TRANSTHORACIC ECHOCARDIOGRAM REPORT    Summary:   1. Left ventricular ejection fraction, by visual estimation, is 60 to 65%.   2. Technically suboptimal study.   3. Normal global left ventricular systolic function.   4. Normal left ventricular internal cavity size.   5. Spectral Doppler shows pseudonormal pattern of left ventricular myocardial filling (Grade II diastolic dysfunction).   6. Normal right ventricular size and function.   7. Mild to moderately enlarged left atrium.   8. There is no evidence of pericardial effusion.   9. Mild mitral annular calcification.  10. Mild mitral valve regurgitation.  11. Mild thickening and calcification of the anterior and posterior mitral valve leaflets.  12. Peak transmitral mean gradient equals 6.8 mmHg, calculated mitral valve area by pressure half time equals 1.86 cm² consistent withmild mitral stenosis.  13. Mild tricuspid regurgitation.  14. Mild aortic regurgitation.  15. Sclerotic aortic valve with normal opening.  16. Estimated pulmonary artery systolic pressure is 68.4 mmHg assuming a right atrial pressure of 8 mmHg, whichis consistent with severe pulmonary hypertension.  17. C/w the study of 6-21-20, more significant pulmonary htn is now noted.  18. Endocardial visualization was enhanced with intravenous echo contrast.  19. Mitral valve mean gradient is 6.8 mmHg consistent with moderate mitral stenosis.      RADIOLOGY & ADDITIONAL TESTS:

## 2021-01-06 NOTE — H&P ADULT - NSHPLABSRESULTS_GEN_ALL_CORE
LABS:                        8.6    8.52  )-----------( 389      ( 05 Jan 2021 21:57 )             30.4     01-05    134<L>  |  95<L>  |  59<H>  ----------------------------<  113<H>  4.6   |  37<H>  |  1.53<H>    Ca    9.3      05 Jan 2021 10:33      PT/INR - ( 05 Jan 2021 10:33 )   PT: 25.4 sec;   INR: 2.28 ratio         PTT - ( 06 Jan 2021 00:03 )  PTT:48.3 sec                Thyroid Stimulating Hormone, Serum: 1.630 uIU/mL (01-02 @ 06:57)

## 2021-01-06 NOTE — PHYSICAL THERAPY INITIAL EVALUATION ADULT - GENERAL OBSERVATIONS, REHAB EVAL
pt ori 30 min eval fair. pt transferred with SOB, chest pain, and dizziness, concerns for fistula between aortic valve and RV. pt rec'd in bed, tele, 2L NC, heparin gtt, NAD. pt cleared with FREDDY Hanson. pt agreed to session, A&Ox4, following all commands, minimally conversive, appeared slightly sad (pt reports unable to sleep last 3 nights).

## 2021-01-06 NOTE — H&P ADULT - ASSESSMENT
70 years      h/o   CAD,  mechanical MVR and Tricuspid valve repair,        h/o  chronic   HFpEF,   HTN  . MIGUEL ,     severe MR  ,  s/p mechanical MVR 1999 , [facility] with Dr. Anderson,  and severe TR s/p TVR 2012 ([facility]),       COPD ,on home 02; no prior tobacco use      AFib on Coumadin , failed  ablation,   ,  PHTN,   CKD 3 (b/l SCr ~1.3-1.6),  MIGUEL and gout.     h/o   hypercarbia and she reports  needing Bipap,in the past,  but hasn't used in 10 years.      Morbid  obesity,  BMI is 41     presented with  CP,  dizziness and sob     She also had epistaxis requiring packed red blood cell support.     tele, Afib  mostly in 60's to  70's     1.,  Atrial fibrillation    on Lasix  bid,   metoprolol ER 25 mg  / lipitor     2.  MVR  mechanical,1999  Continue IV heparin and warfarin with goal INR of 2.5-3.5 with mechanical MVR   daily INR   3.  TVR, 2012 at [facility]   4.  HTN, on Toprol   5. Dizziness , since resolved  Ct head , with infarcts, Right b. ganglia and Right cerebellum   6. .  Anemia, hb is  7.9   c/c  anemia, , baseline is  8  to 9/   last colonoscopy  was  10 yrs  ago/ s/p melena last yr. and, no w/p was done.,as  pt was  deemed high risk  for  any procedure  7.  Acute  Diastolic  Chf    on iv lasix 60  mg,   bid        daily weights/ follow   I/O//   still  with sob on exertion  8.  Severe Pulm Htn, on echo  Echo  12/20,normal  ef/mod  MS/  severe Pulm Htn    per card dr mcnamara , pt   transferred to  [place]   for ELIZABETH/ ? fistula  from aorta  to RV/   follow  dialy INR  ELIZABETH, pending       < from: CT Head No Cont (12.29.20 @ 20:41)   IMPRESSION: No acute intracranial hemorrhage. Age-indeterminate right basal ganglia lacunar infarct and right cerebellar lacunar infarct. If there is clinical suspicion for acute infarct, consider brain MRI if there is no contraindication.  < end of copied text >      < from: TTE Echo Complete w/ Contrast w/ Doppler (12.31.20 @ 14:01) >  ummary:   1. Left ventricular ejection fraction, by visual estimation, is 60 to 65%.   2. Technically suboptimal study.   3. Normal global left ventricular systolic function.   4. Normal left ventricular internal cavity size.   5. Spectral Doppler shows pseudonormal pattern of left ventricular myocardial filling (Grade II diastolic dysfunction).   6. Normal right ventricular size and function.   7. Mild to moderately enlarged left atrium.   8. There is no evidence of pericardial effusion.   9. Mild mitral annular calcification.  10. Mild mitral valve regurgitation.  11. Mild thickening and calcification of the anterior and posterior mitral valve leaflets.  12. Peak transmitral mean gradient equals 6.8 mmHg, calculated mitral valve area by pressure half time equals 1.86 cm² consistent withmild mitral stenosis.  13. Mild tricuspid regurgitation.  14. Mild aortic regurgitation.  15. Sclerotic aortic valve with normal opening.  16. Estimated pulmonary artery systolic pressure is 68.4 mmHg assuming a right atrial pressure of 8 mmHg, whichis consistent with severe pulmonary hypertension.  17. C/w the study of 6-21-20, more significant pulmonary htn is now noted.  18. Endocardial visualization was enhanced with intravenous echo contrast.  19. Mitral valve mean gradient is 6.8 mmHg consistent with moderate mitral stenosis.  Montana Barreto MD FACC, FASE, FAC  < end of copied text >

## 2021-01-06 NOTE — CONSULT NOTE ADULT - ATTENDING COMMENTS
anemia, hgb stable,  in setting of decompensated heart failure, with baseline sob    plan ppi daily           conservative management.
69 y/o female with HFpEF, CAD, mechanical MVR, s/p TV repair, AF s/p ablation, HTN, DLP, CKD stage 2-3, COPD on home O2 and MIGUEL admitted to [facility] system with acute on chronic HFpEF.  She had TTE which showed a ?abnormal color doppler flow at the level of the perimembranous or subaortic septum. She was transferred to [facility] for further imaging. She was also found to have MV mean gradient 6.8 mmHg and RVSP ~68 mmHg.   Last HF hospitalizations: July 2020  Had [facility] last year remarkable for elevated filling pressures and pHTN group 2.   Clinically remains hypervolemic.   Needs aggressive diuresis and optimization of comorbidities.   Plan for ELIZABETH once euvolemic to assess ?intracardiac shunt and MV.   Will benefit from cardioMEMs, may not be a candidate due to chest circumference.

## 2021-01-06 NOTE — CONSULT NOTE ADULT - PROBLEM SELECTOR RECOMMENDATION 3
- s/p mechanical MVR 1999 w/ Dr. Anderson at [facility], no revisions   - min MR TTE 12/31   - on hep bridge to coumadin - s/p mechanical MVR 1999 w/ Dr. Anderson at [facility], no revisions   - min MR TTE 12/31  - plan as above

## 2021-01-06 NOTE — CONSULT NOTE ADULT - PROBLEM SELECTOR RECOMMENDATION 4
- s/p mechanical TVR 2012 at [facility]  - min TR on TTE 12/31  - mgt as above s/p ablation (many years ago)  - rate controlled w/ intermittent self-resolving RVR   - holding home metoprolol 25 ER  - Keep Mg >2, K >4

## 2021-01-06 NOTE — CONSULT NOTE ADULT - PROBLEM SELECTOR RECOMMENDATION 7
- [facility] 10/2020: PA 50/17/29; PCW 21/20/16  - PSAP 68.4 on TTE, likely elevated iso decompensated HF  - c/w diuresis as above    Dr. Kavita Mckeon, PGY2  Resident, HF service - [facility] 10/2020: PA 50/17/29; PCW 21/20/16  - PSAP 68.4 on TTE, likely elevated iso decompensated HF  - c/w diuresis as above    Final recs pending discussion with HF attending   Dr. Kavita Mckeon, PGY2  Resident, HF service - [facility] 10/2020: PA 50/17/29 likely iso elevated PCW 21/20/16  - PSAP 68.4 (severe pHTN), likely elevated iso decompensated HF  - c/w diuresis as above    Final recs pending discussion with HF attending   Dr. Kavita Mckeon, PGY2  Resident, HF service Presented to [facility] w/ MISAEL on CKD   - holding home ramipril 5 mg iso MISAEL on CKD   - Cr now at bl 1.3-1.5    Recs are final as discussed with HF attending and team  Dr. Kavita Mckeon, PGY2  Resident, HF service

## 2021-01-07 DIAGNOSIS — R04.0 EPISTAXIS: ICD-10-CM

## 2021-01-07 LAB
ANION GAP SERPL CALC-SCNC: 11 MMOL/L — SIGNIFICANT CHANGE UP (ref 5–17)
APTT BLD: 117.7 SEC — HIGH (ref 27.5–35.5)
APTT BLD: 67.2 SEC — HIGH (ref 27.5–35.5)
APTT BLD: 70.2 SEC — HIGH (ref 27.5–35.5)
BLD GP AB SCN SERPL QL: NEGATIVE — SIGNIFICANT CHANGE UP
BUN SERPL-MCNC: 64 MG/DL — HIGH (ref 7–23)
CALCIUM SERPL-MCNC: 10.3 MG/DL — SIGNIFICANT CHANGE UP (ref 8.4–10.5)
CHLORIDE SERPL-SCNC: 94 MMOL/L — LOW (ref 96–108)
CO2 SERPL-SCNC: 33 MMOL/L — HIGH (ref 22–31)
CREAT SERPL-MCNC: 1.54 MG/DL — HIGH (ref 0.5–1.3)
GLUCOSE SERPL-MCNC: 97 MG/DL — SIGNIFICANT CHANGE UP (ref 70–99)
HCT VFR BLD CALC: 29 % — LOW (ref 34.5–45)
HGB BLD-MCNC: 8.4 G/DL — LOW (ref 11.5–15.5)
INR BLD: 2.02 RATIO — HIGH (ref 0.88–1.16)
MAGNESIUM SERPL-MCNC: 2.3 MG/DL — SIGNIFICANT CHANGE UP (ref 1.6–2.6)
MCHC RBC-ENTMCNC: 26 PG — LOW (ref 27–34)
MCHC RBC-ENTMCNC: 29 GM/DL — LOW (ref 32–36)
MCV RBC AUTO: 89.8 FL — SIGNIFICANT CHANGE UP (ref 80–100)
NRBC # BLD: 0 /100 WBCS — SIGNIFICANT CHANGE UP (ref 0–0)
PLATELET # BLD AUTO: 382 K/UL — SIGNIFICANT CHANGE UP (ref 150–400)
POTASSIUM SERPL-MCNC: 4.3 MMOL/L — SIGNIFICANT CHANGE UP (ref 3.5–5.3)
POTASSIUM SERPL-SCNC: 4.3 MMOL/L — SIGNIFICANT CHANGE UP (ref 3.5–5.3)
PROTHROM AB SERPL-ACNC: 23.4 SEC — HIGH (ref 10.6–13.6)
RBC # BLD: 3.23 M/UL — LOW (ref 3.8–5.2)
RBC # FLD: 16.4 % — HIGH (ref 10.3–14.5)
RH IG SCN BLD-IMP: POSITIVE — SIGNIFICANT CHANGE UP
SARS-COV-2 IGG SERPL QL IA: NEGATIVE — SIGNIFICANT CHANGE UP
SARS-COV-2 IGM SERPL IA-ACNC: 0.07 INDEX — SIGNIFICANT CHANGE UP
SODIUM SERPL-SCNC: 138 MMOL/L — SIGNIFICANT CHANGE UP (ref 135–145)
WBC # BLD: 8.73 K/UL — SIGNIFICANT CHANGE UP (ref 3.8–10.5)
WBC # FLD AUTO: 8.73 K/UL — SIGNIFICANT CHANGE UP (ref 3.8–10.5)

## 2021-01-07 PROCEDURE — 99233 SBSQ HOSP IP/OBS HIGH 50: CPT

## 2021-01-07 PROCEDURE — 99223 1ST HOSP IP/OBS HIGH 75: CPT

## 2021-01-07 RX ORDER — SODIUM CHLORIDE 0.65 %
2 AEROSOL, SPRAY (ML) NASAL
Refills: 0 | Status: DISCONTINUED | OUTPATIENT
Start: 2021-01-07 | End: 2021-01-09

## 2021-01-07 RX ORDER — SODIUM CHLORIDE 0.65 %
1 AEROSOL, SPRAY (ML) NASAL
Refills: 0 | Status: DISCONTINUED | OUTPATIENT
Start: 2021-01-07 | End: 2021-01-07

## 2021-01-07 RX ORDER — BACITRACIN ZINC 500 UNIT/G
1 OINTMENT IN PACKET (EA) TOPICAL
Refills: 0 | Status: DISCONTINUED | OUTPATIENT
Start: 2021-01-07 | End: 2021-01-20

## 2021-01-07 RX ORDER — WARFARIN SODIUM 2.5 MG/1
7 TABLET ORAL ONCE
Refills: 0 | Status: COMPLETED | OUTPATIENT
Start: 2021-01-07 | End: 2021-01-07

## 2021-01-07 RX ADMIN — HEPARIN SODIUM 1000 UNIT(S)/HR: 5000 INJECTION INTRAVENOUS; SUBCUTANEOUS at 16:29

## 2021-01-07 RX ADMIN — HEPARIN SODIUM 1000 UNIT(S)/HR: 5000 INJECTION INTRAVENOUS; SUBCUTANEOUS at 08:34

## 2021-01-07 RX ADMIN — BUDESONIDE AND FORMOTEROL FUMARATE DIHYDRATE 2 PUFF(S): 160; 4.5 AEROSOL RESPIRATORY (INHALATION) at 06:03

## 2021-01-07 RX ADMIN — Medication 100 MILLIGRAM(S): at 11:39

## 2021-01-07 RX ADMIN — Medication 25 MILLIGRAM(S): at 06:03

## 2021-01-07 RX ADMIN — WARFARIN SODIUM 7 MILLIGRAM(S): 2.5 TABLET ORAL at 22:32

## 2021-01-07 RX ADMIN — BUDESONIDE AND FORMOTEROL FUMARATE DIHYDRATE 2 PUFF(S): 160; 4.5 AEROSOL RESPIRATORY (INHALATION) at 17:28

## 2021-01-07 RX ADMIN — Medication 1 SPRAY(S): at 17:28

## 2021-01-07 RX ADMIN — Medication 1 APPLICATION(S): at 17:28

## 2021-01-07 RX ADMIN — BUMETANIDE 2 MILLIGRAM(S): 0.25 INJECTION INTRAMUSCULAR; INTRAVENOUS at 06:04

## 2021-01-07 RX ADMIN — BUMETANIDE 2 MILLIGRAM(S): 0.25 INJECTION INTRAMUSCULAR; INTRAVENOUS at 17:27

## 2021-01-07 RX ADMIN — Medication 1 TABLET(S): at 22:31

## 2021-01-07 RX ADMIN — SIMVASTATIN 10 MILLIGRAM(S): 20 TABLET, FILM COATED ORAL at 22:32

## 2021-01-07 RX ADMIN — HEPARIN SODIUM 1000 UNIT(S)/HR: 5000 INJECTION INTRAVENOUS; SUBCUTANEOUS at 01:17

## 2021-01-07 RX ADMIN — Medication 2 SPRAY(S): at 22:32

## 2021-01-07 NOTE — PROGRESS NOTE ADULT - SUBJECTIVE AND OBJECTIVE BOX
Patient is a 70y old  Female who presented with a chief complaint of chf/ chf team (07 Jan 2021 08:58)      INTERVAL HPI/OVERNIGHT EVENTS:  no abdominal pain, nausea or vomiting  no melena or rectal bleeding  +epistaxis overnight ; self- limited- right nare still with rhino-rocket in place    no CP or worsened SOB  on diuresis per CHF team    MEDICATIONS  (STANDING):  allopurinol 100 milliGRAM(s) Oral daily  BACItracin   Ointment 1 Application(s) Topical two times a day  budesonide 160 MICROgram(s)/formoterol 4.5 MICROgram(s) Inhaler 2 Puff(s) Inhalation two times a day  buMETAnide Injectable 2 milliGRAM(s) IV Push every 12 hours  heparin  Infusion. 1400 Unit(s)/Hr (14 mL/Hr) IV Continuous <Continuous>  metoprolol succinate ER 25 milliGRAM(s) Oral daily  simvastatin 10 milliGRAM(s) Oral at bedtime  sodium chloride 0.65% Nasal 1 Spray(s) Both Nostrils four times a day  warfarin 7 milliGRAM(s) Oral once    MEDICATIONS  (PRN):  heparin   Injectable 9000 Unit(s) IV Push every 6 hours PRN For aPTT less than 40  heparin   Injectable 4000 Unit(s) IV Push every 6 hours PRN For aPTT between 40 - 57      Allergies  No Known Allergies      Review of Systems:  General:  No wt loss, fevers, chills, night sweats  CV:  No current pain, see HPI  Resp:  No dyspnea, cough, tachypnea,  +SOB +COPD - on home O2  GI:  No pain, No nausea, No vomiting, No diarrhea, No constipation, No weight loss, No fever, No pruritis, No rectal bleeding, No tarry stools, No dysphagia,  :  No pain, bleeding, incontinence, nocturia  Muscle:  No pain, weakness  Neuro:  No focal weakness, tingling, memory problems  Psych:  No fatigue, insomnia, mood problems, depression  Endocrine:  No polyuria, polydypsia, cold/heat intolerance  Heme:  No petechiae, ecchymosis, easy bruisability  +AC +epistaxis  Skin:  No rash, tattoos, scars, edema      Vital Signs Last 24 Hrs  T(C): 36.9 (07 Jan 2021 12:50), Max: 36.9 (06 Jan 2021 20:28)  T(F): 98.4 (07 Jan 2021 12:50), Max: 98.5 (06 Jan 2021 20:28)  HR: 59 (07 Jan 2021 12:50) (59 - 73)  BP: 133/69 (07 Jan 2021 12:50) (111/68 - 173/89)  BP(mean): --  RR: 18 (07 Jan 2021 12:50) (18 - 20)  SpO2: 100% (07 Jan 2021 12:50) (97% - 100%)    PHYSICAL EXAM:  Constitutional: NAD, morbidly obese AA female  +nasal packing in right nare, on supplemental oxygen OOB to chair  Neck: No LAD, supple +JVD  Respiratory: basilar rales, no wheeze  Cardiovascular: S1 and S2, RRR,  +mechanical click  Gastrointestinal: BS+, obese soft, NT/ND  Extremities: +edema b/l  neg clubbing, cyanosis  Vascular: 2+ peripheral pulses  Neurological: A/O x 3, no focal deficits  Psychiatric: Normal mood, normal affect  Skin: No rashes, anicteric      LABS:                        8.4    8.73  )-----------( 382      ( 07 Jan 2021 07:01 )             29.0     01-07    138  |  94<L>  |  64<H>  ----------------------------<  97  4.3   |  33<H>  |  1.54<H>    Ca    10.3      07 Jan 2021 07:00  Mg     2.3     01-07      PT/INR - ( 07 Jan 2021 07:00 )   PT: 23.4 sec;   INR: 2.02 ratio    PTT - ( 07 Jan 2021 07:00 )  PTT:67.2 sec          RADIOLOGY & ADDITIONAL TESTS:

## 2021-01-07 NOTE — CONSULT NOTE ADULT - ASSESSMENT
70y on AC and NC with b/l epistaxis, R controlled at [place] 1/5 with RR, left not actively bleeding.

## 2021-01-07 NOTE — PROGRESS NOTE ADULT - ASSESSMENT
69 yo female with a PMHx of CAD, HFpEF (EF 65-60%), severe MR s/p mechanical MVR 1999 (Dr. Anderson, [facility]), severe TR s/p mechanical TVR (2012 [facility]), afib s/p ablation, HTN, HLD, CKD (bl 1.35-1.5), COPD on 3L NC at home, MIGUEL who presented to the [facility] for cp, sob, dizziness x 1 week, found to be in decompensated HFpEF, w/ tachy-ofelia rhythm. Seen by Dr. Thrasher Cardiology at [facility]. s/p IV diuresis w/ lasix BID. TTE 12/31 reviewed by [facility] Dr. Escobar, c/f possible L-R shunt, ?fistula between aortic valve and RV. Patient transferred to [facility] for ELIZABETH and escalation of care. Remains hypervolemic. Continued on IV diuresis.     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 Anderson, [facility]), severe TR s/p mechanical TVR (2012 [facility]), afib s/p ablation, HTN, HLD, CKD (bl 1.35-1.5), COPD on 3L NC at home, MIGUEL who presented to the [facility] for cp, sob, dizziness x 1 week, found to be in decompensated HFpEF, w/ tachy-ofelia rhythm. Seen by Dr. Thrasher Cardiology at [facility]. s/p IV diuresis w/ lasix BID. TTE 12/31 reviewed by [facility] Dr. Escobar, c/f possible L-R shunt, ?fistula between aortic valve and RV. Patient transferred to [facility] for ELIZABETH and escalation of care. Remains hypervolemic. Continued on IV diuresis.     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

## 2021-01-07 NOTE — CHART NOTE - NSCHARTNOTEFT_GEN_A_CORE
Medicine NP Note    Called by RN for epistaxis.  Evaluated patient at bedside.  Of note patient has history of epistaxis requiring transfusion. Currently has nasal packing in right nostril.  Is on heparin gtt/coumadin for hx of afib and mechanical mitral valve.             HPI:  70 years      h/o   CAD,  mechanical MVR and Tricuspid valve repair,        h/o  chronic   HFpEF,   HTN  . MIGUEL ,     severe MR  ,  s/p mechanical MVR 1999 , [facility] with Dr. Anderson,  and severe TR s/p TVR 2012 ([facility]),       COPD ,on home 02; no prior tobacco use      AFib on Coumadin , failed  ablation,   ,  PHTN,   CKD 3 (b/l SCr ~1.3-1.6),  MIGUEL and gout.     h/o   hypercarbia and she reports  needing Bipap,in the past,  but hasn't used in 10 years.      Morbid  obesity,  BMI is 41     presented with  CP,  dizziness and sob   to [facility]/ samuel valle, for sob/ chf   She also had epistaxis requiring packed red blood cell support.  and  pe r card . pt waS  transferred for higher level of care/ [facility]  pt on 3  dsu now (06 Jan 2021 09:09)        Impression:       Plan:        >  >  >  >    Dedrick Elmore NP  ACP Providers  Spectra #      Follow up with Attending in AM. Medicine NP Note    Called by RN for epistaxis.  Evaluated patient at bedside.  Of note patient has history of epistaxis requiring transfusion. Currently has nasal packing in right nostril.  Is on heparin gtt/coumadin for hx of afib and mechanical mitral valve.  Bleeding had stopped upon arrival to bedside.  Patients airway appears patent, in no respiratory distress.          Impression:  Epistaxis      Plan:        >Monitor for s/s further bleeding/airway compromise  >Continue to monitor coags while on heparin gtt/coumadin--pt with mechanical mitral valve  >Possible ENT consult in setting of nasal packing and continued epistaxis   >Follow up with primary team in AM     Dedrick Elmore NP  ACP Providers  Spectra #[phone] Medicine NP Note    Called by RN for epistaxis.  Evaluated patient at bedside.  Of note patient has history of epistaxis requiring transfusion. Currently has nasal packing in right nostril.  Is on heparin gtt/coumadin for hx of afib and mechanical mitral valve.  Bleeding had stopped upon arrival to bedside.  Patients airway appears patent, in no respiratory distress.          Impression:  Epistaxis      Plan:        >Monitor for s/s further bleeding/airway compromise  >Bedside suction set up    >Continue to monitor coags while on heparin gtt/coumadin--pt with mechanical mitral valve  >Possible ENT consult in setting of nasal packing and continued epistaxis   >Follow up with primary team in AM     Dedrick Elmore NP  ACP Providers  Spectra #[phone]

## 2021-01-07 NOTE — CONSULT NOTE ADULT - SUBJECTIVE AND OBJECTIVE BOX
CC: epistaxis     HPI:  Patient is a 70y old  Female who presents with a chief complaint of chf. Pt h/o CAD, mechanical MVR and Tricuspid valve repair, h/o  chronic   HFpEF,   HTN, MIGUEL, severe MR, s/p mechanical MVR 1999, severe TR s/p TVR 2012 ([facility]), COPD ,on home 02; no prior tobacco use, AFib on Coumadin , failed  ablation, CKD 3, MIGUEL and gout. h/o hypercarbia and she reports  needing Bipap,in the past, but hasn't used in 10 years. Morbid obesity,  BMI is 41. presented with  CP,  [facility]. She also had epistaxis requiring packed red blood cell support and right nare packed with RR. ENT called for eval of right epistaxis overnight that spontaneously resolved on its on. Pt on NC and AC. Pt denies Pt denies h/a, recent trauma, URI, congestion, facial pain, facial tenderness, rhinorrhea, PND or changes in vision    PAST MEDICAL & SURGICAL HISTORY:  COPD (chronic obstructive pulmonary disease)    Hypertension    CHF (congestive heart failure)    Tricuspid valve replaced    Mitral valve replaced    Gout    HTN (hypertension)    CHF (congestive heart failure)    COPD (chronic obstructive pulmonary disease)  on home O2    MIGUEL (obstructive sleep apnea)    Pulmonary hypertension    Atrial fibrillation  S/P Ablation    No significant past surgical history    Tricuspid valve replaced  mechanical    History of mitral valve replacement with mechanical valve      Allergies    No Known Allergies    Intolerances      MEDICATIONS  (STANDING):  allopurinol 100 milliGRAM(s) Oral daily  budesonide 160 MICROgram(s)/formoterol 4.5 MICROgram(s) Inhaler 2 Puff(s) Inhalation two times a day  buMETAnide Injectable 2 milliGRAM(s) IV Push every 12 hours  heparin  Infusion. 1400 Unit(s)/Hr (14 mL/Hr) IV Continuous <Continuous>  metoprolol succinate ER 25 milliGRAM(s) Oral daily  simvastatin 10 milliGRAM(s) Oral at bedtime  warfarin 7 milliGRAM(s) Oral once    MEDICATIONS  (PRN):  heparin   Injectable 9000 Unit(s) IV Push every 6 hours PRN For aPTT less than 40  heparin   Injectable 4000 Unit(s) IV Push every 6 hours PRN For aPTT between 40 - 57      Social History: no tobacco, no etoh   Family history: Pt denies any sign FHx    ROS:   ENT: all negative except as noted in HPI   CV: denies palpitations  Pulm: denies SOB, cough, hemoptysis  GI: denies change in apetite, indigestion, n/v  : denies pertinent urinary symptoms, urgency  Neuro: denies numbness/tingling, loss of sensation  Psych: denies anxiety  MS: denies muscle weakness, instability  Heme: denies easy bruising or bleeding  Endo: denies heat/cold intolerance, excessive sweating  Vascular: denies LE edema    Vital Signs Last 24 Hrs  T(C): 36.8 (07 Jan 2021 04:40), Max: 37.1 (06 Jan 2021 12:25)  T(F): 98.3 (07 Jan 2021 04:40), Max: 98.7 (06 Jan 2021 12:25)  HR: 68 (07 Jan 2021 05:59) (60 - 78)  BP: 111/68 (07 Jan 2021 05:59) (111/68 - 173/89)  BP(mean): --  RR: 18 (07 Jan 2021 04:40) (18 - 20)  SpO2: 98% (07 Jan 2021 04:40) (96% - 99%)                          8.4    8.73  )-----------( 382      ( 07 Jan 2021 07:01 )             29.0    01-07    138  |  94<L>  |  64<H>  ----------------------------<  97  4.3   |  33<H>  |  1.54<H>    Ca    10.3      07 Jan 2021 07:00  Mg     2.3     01-07     PT/INR - ( 07 Jan 2021 07:00 )   PT: 23.4 sec;   INR: 2.02 ratio         PTT - ( 07 Jan 2021 07:00 )  PTT:67.2 sec    PHYSICAL EXAM:  Gen: NAD  Skin: No rashes, bruises, or lesions  Head: Normocephalic, Atraumatic  Face: no edema, erythema, or fluctuance. Parotid glands soft without mass  Eyes: no scleral injection  Nose: right nare with RR in place, left with dry blood, no active bleed.   Mouth: No Stridor / Drooling / Trismus.  Mucosa moist, tongue/uvula midline, oropharynx clear  Neck: Flat, supple, no lymphadenopathy, trachea midline, no masses  Lymphatic: No lymphadenopathy  Resp: breathing easily, no stridor  CV: no peripheral edema/cyanosis  GI: nondistended   Peripheral vascular: no JVD or edema  Neuro: facial nerve intact, no facial droop

## 2021-01-07 NOTE — CONSULT NOTE ADULT - PROBLEM SELECTOR RECOMMENDATION 9
- gram-positive abx coverage for duration of packing placement  - Strict blood pressure control.  - trim NC prongs  - Nasal saline, 2 sprays to both nares 4 times a day  - baci  - Avoid nasal trauma; no nose rubbing, blowing or manipulating nasal packing.  Sneeze with mouth open and pinching nares.  - Avoid bending with head blow the waist.    -No heavy lifting - gram-positive abx coverage for duration of packing placement  - Strict blood pressure control.  - trim NC prongs  - Nasal saline, 2 sprays to both nares 4 times a day  - baci  - Avoid nasal trauma; no nose rubbing, blowing or manipulating nasal packing.  Sneeze with mouth open and pinching nares.  - Avoid bending with head blow the waist.    -No heavy lifting  - no contraindication to get ELIZABETH

## 2021-01-07 NOTE — PROGRESS NOTE ADULT - SUBJECTIVE AND OBJECTIVE BOX
afebrile   tele, afib    REVIEW OF SYSTEMS:  GEN: no fever,    no chills  RESP: no SOB,   no cough  CVS: no chest pain,   no palpitations  GI: no abdominal pain,   no nausea,   no vomiting,   no constipation,   no diarrhea  : no dysuria,   no frequency  NEURO: no headache,   no dizziness  PSYCH: no depression,   not anxious  Derm : no rash    MEDICATIONS  (STANDING):  allopurinol 100 milliGRAM(s) Oral daily  budesonide 160 MICROgram(s)/formoterol 4.5 MICROgram(s) Inhaler 2 Puff(s) Inhalation two times a day  buMETAnide Injectable 2 milliGRAM(s) IV Push every 12 hours  heparin  Infusion. 1400 Unit(s)/Hr (14 mL/Hr) IV Continuous <Continuous>  metoprolol succinate ER 25 milliGRAM(s) Oral daily  simvastatin 10 milliGRAM(s) Oral at bedtime    MEDICATIONS  (PRN):  heparin   Injectable 9000 Unit(s) IV Push every 6 hours PRN For aPTT less than 40  heparin   Injectable 4000 Unit(s) IV Push every 6 hours PRN For aPTT between 40 - 57      Vital Signs Last 24 Hrs  T(C): 36.8 (07 Jan 2021 04:40), Max: 37.1 (06 Jan 2021 12:25)  T(F): 98.3 (07 Jan 2021 04:40), Max: 98.7 (06 Jan 2021 12:25)  HR: 68 (07 Jan 2021 05:59) (60 - 78)  BP: 111/68 (07 Jan 2021 05:59) (111/68 - 173/89)  BP(mean): --  RR: 18 (07 Jan 2021 04:40) (18 - 20)  SpO2: 98% (07 Jan 2021 04:40) (96% - 99%)  CAPILLARY BLOOD GLUCOSE        I&O's Summary    06 Jan 2021 07:01  -  07 Jan 2021 07:00  --------------------------------------------------------  IN: 610 mL / OUT: 652 mL / NET: -42 mL        PHYSICAL EXAM:  HEAD:  Atraumatic, Normocephalic  NECK: Supple, No   JVD  CHEST/LUNG:   no     rales,     no,    rhonchi  HEART: Regular rate and rhythm;         murmur  ABDOMEN: Soft, Nontender, ;   EXTREMITIES:     no   edema  NEUROLOGY:  alert    LABS:                        8.4    8.73  )-----------( 382      ( 07 Jan 2021 07:01 )             29.0     01-07    138  |  94<L>  |  64<H>  ----------------------------<  97  4.3   |  33<H>  |  1.54<H>    Ca    10.3      07 Jan 2021 07:00  Mg     2.3     01-07      PT/INR - ( 07 Jan 2021 07:00 )   PT: 23.4 sec;   INR: 2.02 ratio         PTT - ( 07 Jan 2021 07:00 )  PTT:67.2 sec                Thyroid Stimulating Hormone, Serum: 1.630 uIU/mL (01-02 @ 06:57)          Consultant(s) Notes Reviewed:      Care Discussed with Consultants/Other Providers:

## 2021-01-07 NOTE — PROGRESS NOTE ADULT - PROBLEM SELECTOR PLAN 2
s/p mechanical TVR 2012 at [facility]  - severe functional TR on TTE 12/31  - on hep bridge to coumadin. s/p mechanical TVR 2012 at [facility]  - severe functional TR on TTE 12/31  - on hep bridge to coumadin

## 2021-01-07 NOTE — PROGRESS NOTE ADULT - PROBLEM SELECTOR PLAN 7
Presented to [facility] w/ MISAEL on CKD   - holding home ramipril 5 mg iso MISAEL on CKD   - Cr now at bl 1.3-1.    Final recs pending discussion with attending and HF team  Dr. Kavita Mckeon, PGY2  Resident, HF service Presented to [facility] w/ MISAEL on CKD   - holding home ramipril 5 mg iso MISAEL on CKD   - Cr now at bl 1.3-1.5    Final recs pending discussion with attending and HF team  Dr. Kavita Mckeon, PGY2  Resident, HF service Presented to [facility] w/ MISAEL on CKD   - holding home ramipril 5 mg iso MISAEL on CKD   - Cr now at bl 1.3-1.5    Recs are final as discussed with HF attending   Dr. Kavita Mckeon, PGY2  Resident, HF service

## 2021-01-07 NOTE — PROGRESS NOTE ADULT - PROBLEM SELECTOR PLAN 1
- c/w IV bumex 2mg BID  - Fluid restrict 1.5 L, daily standing weights   - Strict Is and Os   - repeat TTE to eval ?L-V shunt as initial study w/ poor image quality   - ELIZABETH pending this AM - c/w IV bumex 2mg BID  - Fluid restrict 1.5 L, daily standing weights   - Strict Is and Os, consider external female catheter for more accurate output recording  - repeat TTE to eval ?L-V shunt as initial study w/ poor image quality   - ELIZABETH pending this AM Remains slightly hypervolemic   - c/w IV bumex 2mg BID for an additional day   - Fluid restrict 1.5 L, daily standing weights   - Strict Is and Os, consider external female catheter for more accurate output recording  - Keep NPO after MN for re-attempt at [facility] in AM, anticipate patient will be euvolemic by AM

## 2021-01-07 NOTE — PROGRESS NOTE ADULT - ASSESSMENT
70 years      h/o   CAD,  mechanical MVR and Tricuspid valve repair,        h/o  chronic   HFpEF,   HTN  . MIGUEL ,     severe MR  ,  s/p mechanical MVR 1999 , [facility] with Dr. Anderson,  and severe TR s/p TVR 2012 ([facility]),       COPD ,on home 02; no prior tobacco use      AFib on Coumadin , failed  ablation,   ,  PHTN,   CKD 3 (b/l SCr ~1.3-1.6),  MIGUEL and gout.     h/o   hypercarbia and she reports  needing Bipap,in the past,  but hasn't used in 10 years.      Morbid  obesity,  BMI is 41     presented with  CP,  dizziness and sob     She also had epistaxis at [facility],/         1.,  Atrial fibrillation    on Lasix  bid,   metoprolol ER 25 mg  / lipitor     2.  MVR  mechanical,1999  Continue IV heparin and warfarin with goal INR of 2.5-3.5 with mechanical MVR   daily INR   3.  TVR, 2012 at [facility]   4.  HTN, on Toprol   5. Dizziness , since resolved  Ct head , with infarcts, Right b. ganglia and Right cerebellum   6. .  Anemia,  has  c/c  anemia, , baseline is  8  to 9/   last colonoscopy  was  10 yrs  ago/ s/p melena last yr. and, no w/p was done.,as  pt was  deemed high risk  for  any procedure  7.  Acute  Diastolic  Chf    on iv lasix 60  mg,   bid        daily weights/ follow   I/O//   still  with sob on exertion  pt has lost  2 Kg  8.  Severe Pulm Htn, on echo  Echo  12/20,normal  ef/mod  MS/  severe Pulm Htn  per card dr mcnamara , pt   transferred to  [place]   for ELIZABETH/ ? fistula  from aorta  to RV/   follow  daily  INR  heart  failure team following pt   ELIZABETH,   on 1/7  per chart, pt had  epistaxis  earlier/  ENT eval  spoke  with floor  Np         < from: CT Head No Cont (12.29.20 @ 20:41)   IMPRESSION: No acute intracranial hemorrhage. Age-indeterminate right basal ganglia lacunar infarct and right cerebellar lacunar infarct. If there is clinical suspicion for acute infarct, consider brain MRI if there is no contraindication.  < end of copied text >      < from: TTE Echo Complete w/ Contrast w/ Doppler (12.31.20 @ 14:01) >  ummary:   1. Left ventricular ejection fraction, by visual estimation, is 60 to 65%.   2. Technically suboptimal study.   3. Normal global left ventricular systolic function.   4. Normal left ventricular internal cavity size.   5. Spectral Doppler shows pseudonormal pattern of left ventricular myocardial filling (Grade II diastolic dysfunction).   6. Normal right ventricular size and function.   7. Mild to moderately enlarged left atrium.   8. There is no evidence of pericardial effusion.   9. Mild mitral annular calcification.  10. Mild mitral valve regurgitation.  11. Mild thickening and calcification of the anterior and posterior mitral valve leaflets.  12. Peak transmitral mean gradient equals 6.8 mmHg, calculated mitral valve area by pressure half time equals 1.86 cm² consistent withmild mitral stenosis.  13. Mild tricuspid regurgitation.  14. Mild aortic regurgitation.  15. Sclerotic aortic valve with normal opening.  16. Estimated pulmonary artery systolic pressure is 68.4 mmHg assuming a right atrial pressure of 8 mmHg, whichis consistent with severe pulmonary hypertension.  17. C/w the study of 6-21-20, more significant pulmonary htn is now noted.  18. Endocardial visualization was enhanced with intravenous echo contrast.  19. Mitral valve mean gradient is 6.8 mmHg consistent with moderate mitral stenosis.  Montana Barreto MD FACC, ANGELA, FAC  < end of copied text >                	   70 years      h/o   CAD,  mechanical MVR and Tricuspid valve repair,        h/o  chronic   HFpEF,   HTN  . MIGUEL ,     severe MR  ,  s/p mechanical MVR 1999 , [facility] with Dr. Anderson,  and severe TR s/p TVR 2012 ([facility]),       COPD ,on home 02; no prior tobacco use      AFib on Coumadin , failed  ablation,   ,  PHTN,   CKD 3 (b/l SCr ~1.3-1.6),  MIGUEL and gout.     h/o   hypercarbia and she reports  needing Bipap,in the past,  but hasn't used in 10 years.      Morbid  obesity,  BMI is 41     presented with  CP,  dizziness and sob     She also had epistaxis at [facility],/         1.,  Atrial fibrillation    on Lasix  bid,   metoprolol ER 25 mg  / lipitor     2.  MVR  mechanical,1999  Continue IV heparin and warfarin with goal INR of 2.5-3.5 with mechanical MVR   daily INR   3.  TVR, 2012 at [facility]   4.  HTN, on Toprol   5. Dizziness , since resolved  Ct head , with infarcts, Right b. ganglia and Right cerebellum   6. .  Anemia,  has  c/c  anemia, , baseline is  8  to 9/   last colonoscopy  was  10 yrs  ago/ s/p melena last yr. and, no w/p was done.,as  pt was  deemed high risk  for  any procedure  7.  Acute  Diastolic  Chf    on  iv bumex bid       daily weights/ follow   I/O//   still  with sob on exertion  pt has lost  2 Kg  8.  Severe Pulm Htn, on echo  Echo  12/20,normal  ef/mod  MS/  severe Pulm Htn  per card dr mcnamara , pt   transferred to  [place]   for ELIZABETH/ ? fistula  from aorta  to RV/   follow  daily  INR  heart  failure team following pt   ELIZABETH,   on 1/7  per chart, pt had  epistaxis  earlier/  ENT eval  spoke  with floor  Np         < from: CT Head No Cont (12.29.20 @ 20:41)   IMPRESSION: No acute intracranial hemorrhage. Age-indeterminate right basal ganglia lacunar infarct and right cerebellar lacunar infarct. If there is clinical suspicion for acute infarct, consider brain MRI if there is no contraindication.  < end of copied text >      < from: TTE Echo Complete w/ Contrast w/ Doppler (12.31.20 @ 14:01) >  ummary:   1. Left ventricular ejection fraction, by visual estimation, is 60 to 65%.   2. Technically suboptimal study.   3. Normal global left ventricular systolic function.   4. Normal left ventricular internal cavity size.   5. Spectral Doppler shows pseudonormal pattern of left ventricular myocardial filling (Grade II diastolic dysfunction).   6. Normal right ventricular size and function.   7. Mild to moderately enlarged left atrium.   8. There is no evidence of pericardial effusion.   9. Mild mitral annular calcification.  10. Mild mitral valve regurgitation.  11. Mild thickening and calcification of the anterior and posterior mitral valve leaflets.  12. Peak transmitral mean gradient equals 6.8 mmHg, calculated mitral valve area by pressure half time equals 1.86 cm² consistent withmild mitral stenosis.  13. Mild tricuspid regurgitation.  14. Mild aortic regurgitation.  15. Sclerotic aortic valve with normal opening.  16. Estimated pulmonary artery systolic pressure is 68.4 mmHg assuming a right atrial pressure of 8 mmHg, whichis consistent with severe pulmonary hypertension.  17. C/w the study of 6-21-20, more significant pulmonary htn is now noted.  18. Endocardial visualization was enhanced with intravenous echo contrast.  19. Mitral valve mean gradient is 6.8 mmHg consistent with moderate mitral stenosis.  Montana Barreto MD FACC, ANGELA, FAC  < end of copied text >

## 2021-01-07 NOTE — PROGRESS NOTE ADULT - PROBLEM SELECTOR PLAN 4
s/p ablation (many years ago)  - rate controlled w/ intermittent self-resolving RVR   - holding home metoprolol 25 ER  - Keep Mg >2, K >4. s/p ablation (many years ago)  - rate controlled on Metoprolol 25 ER   - Keep Mg >2, K >4.

## 2021-01-07 NOTE — PROGRESS NOTE ADULT - ASSESSMENT
71 yo female with a PMHx of Morbid Obesity, CAD, HFpEF (EF 65-60%), severe MR s/p mechanical MVR 1999 (Dr. Anderson, [facility]), severe TR s/p mechanical TVR (2012 [facility]), afib s/p ablation, HTN, HLD, CKD (bl 1.35-1.5), COPD on 3L NC at home, MIGUEL who presented to the [facility] for cp, sob, dizziness x 1 week, found to be in decompensated HFpEF, w/ tachy-ofelia rhythm  s/p epistaxis with control of bleeding with nasal packing  Known to have anemia with prior history of GIB 12/2019 - too high risk for endoscopic procedures at that time.  Currently without overt/brisk GI bleed    RECS:  -Diurese per Heart Failure team  -AC per primary team  -no role for invasive GI evaluation at this time  -PPI for GI prophylaxis  -monitor CBC and BMs (though given significant recent epistaxis, stool may be black from swallowed blood)    Further care per primary team  Discussed with Medicine attending    Pan Lawson PA-C    [facility]  [phone]  After hours and weekend coverage [phone]

## 2021-01-07 NOTE — PROGRESS NOTE ADULT - PROBLEM SELECTOR PLAN 3
s/p mechanical MVR 1999 w/ Dr. Anderson at [facility], no revisions   - min MR TTE 12/31  - plan as above.

## 2021-01-07 NOTE — PROGRESS NOTE ADULT - SUBJECTIVE AND OBJECTIVE BOX
**************************************************  Heart Failure Progress Note  Dr. Kavita Mckeon, PGY2  Resident, HF Service   NS Pager [phone]  **************************************************    Subjective:    Medications:  allopurinol 100 milliGRAM(s) Oral daily  budesonide 160 MICROgram(s)/formoterol 4.5 MICROgram(s) Inhaler 2 Puff(s) Inhalation two times a day  buMETAnide Injectable 2 milliGRAM(s) IV Push every 12 hours  heparin   Injectable 9000 Unit(s) IV Push every 6 hours PRN  heparin   Injectable 4000 Unit(s) IV Push every 6 hours PRN  heparin  Infusion. 1400 Unit(s)/Hr IV Continuous <Continuous>  metoprolol succinate ER 25 milliGRAM(s) Oral daily  simvastatin 10 milliGRAM(s) Oral at bedtime      Physical Exam:    Vital Signs Last 24 Hrs  T(C): 36.8 (07 Jan 2021 04:40), Max: 37.1 (06 Jan 2021 12:25)  T(F): 98.3 (07 Jan 2021 04:40), Max: 98.7 (06 Jan 2021 12:25)  HR: 68 (07 Jan 2021 05:59) (60 - 78)  BP: 111/68 (07 Jan 2021 05:59) (111/68 - 173/89)  BP(mean): --  RR: 18 (07 Jan 2021 04:40) (18 - 20)  SpO2: 98% (07 Jan 2021 04:40) (96% - 99%)  I&O's Summary    06 Jan 2021 07:01  -  07 Jan 2021 07:00  --------------------------------------------------------  IN: 610 mL / OUT: 652 mL / NET: -42 mL            Labs:                        8.4    8.73  )-----------( 382      ( 07 Jan 2021 07:01 )             29.0     01-06    137  |  95<L>  |  46<H>  ----------------------------<  104<H>  4.3   |  32<H>  |  1.50<H>    Ca    10.1      06 Jan 2021 08:58      PT/INR - ( 06 Jan 2021 09:00 )   PT: 24.9 sec;   INR: 2.15 ratio         PTT - ( 07 Jan 2021 00:17 )  PTT:117.7 sec               **************************************************  Heart Failure Progress Note  Dr. Kavita Mckeon, PGY2  Resident, HF Service   NS Pager [phone]  **************************************************    Subjective:    Brief episode of self-limited epistaxis ON.     Medications:  allopurinol 100 milliGRAM(s) Oral daily  budesonide 160 MICROgram(s)/formoterol 4.5 MICROgram(s) Inhaler 2 Puff(s) Inhalation two times a day  buMETAnide Injectable 2 milliGRAM(s) IV Push every 12 hours  heparin   Injectable 9000 Unit(s) IV Push every 6 hours PRN  heparin   Injectable 4000 Unit(s) IV Push every 6 hours PRN  heparin  Infusion. 1400 Unit(s)/Hr IV Continuous <Continuous>  metoprolol succinate ER 25 milliGRAM(s) Oral daily  simvastatin 10 milliGRAM(s) Oral at bedtime      Physical Exam:    Vital Signs Last 24 Hrs  T(C): 36.8 (07 Jan 2021 04:40), Max: 37.1 (06 Jan 2021 12:25)  T(F): 98.3 (07 Jan 2021 04:40), Max: 98.7 (06 Jan 2021 12:25)  HR: 68 (07 Jan 2021 05:59) (60 - 78)  BP: 111/68 (07 Jan 2021 05:59) (111/68 - 173/89)  BP(mean): --  RR: 18 (07 Jan 2021 04:40) (18 - 20)  SpO2: 98% (07 Jan 2021 04:40) (96% - 99%)  I&O's Summary    06 Jan 2021 07:01  -  07 Jan 2021 07:00  --------------------------------------------------------  IN: 610 mL / OUT: 652 mL / NET: -42 mL            Labs:                        8.4    8.73  )-----------( 382      ( 07 Jan 2021 07:01 )             29.0     01-06    137  |  95<L>  |  46<H>  ----------------------------<  104<H>  4.3   |  32<H>  |  1.50<H>    Ca    10.1      06 Jan 2021 08:58      PT/INR - ( 06 Jan 2021 09:00 )   PT: 24.9 sec;   INR: 2.15 ratio         PTT - ( 07 Jan 2021 00:17 )  PTT:117.7 sec               **************************************************  Heart Failure Progress Note  Dr. Kavita Mckeon, PGY2  Resident, HF Service   NS Pager [phone]  **************************************************    Subjective:    Brief episode of self-limited epistaxis ON. This AM, no cp, sob, heart palpitations light headedness, orthopnea. Symptomatically improving from yesterday.     Tele: afib 50-70s    Medications:  allopurinol 100 milliGRAM(s) Oral daily  budesonide 160 MICROgram(s)/formoterol 4.5 MICROgram(s) Inhaler 2 Puff(s) Inhalation two times a day  buMETAnide Injectable 2 milliGRAM(s) IV Push every 12 hours  heparin   Injectable 9000 Unit(s) IV Push every 6 hours PRN  heparin   Injectable 4000 Unit(s) IV Push every 6 hours PRN  heparin  Infusion. 1400 Unit(s)/Hr IV Continuous <Continuous>  metoprolol succinate ER 25 milliGRAM(s) Oral daily  simvastatin 10 milliGRAM(s) Oral at bedtime      Physical Exam:    Vital Signs Last 24 Hrs  T(C): 36.8 (07 Jan 2021 04:40), Max: 37.1 (06 Jan 2021 12:25)  T(F): 98.3 (07 Jan 2021 04:40), Max: 98.7 (06 Jan 2021 12:25)  HR: 68 (07 Jan 2021 05:59) (60 - 78)  BP: 111/68 (07 Jan 2021 05:59) (111/68 - 173/89)  BP(mean): --  RR: 18 (07 Jan 2021 04:40) (18 - 20)  SpO2: 98% (07 Jan 2021 04:40) (96% - 99%)  I&O's Summary    06 Jan 2021 07:01  -  07 Jan 2021 07:00  --------------------------------------------------------  IN: 610 mL / OUT: 652 mL / NET: -42 mL    General: elderly black female in NAD on 3L NC   HEENT: NCAT. No scleral icterus or injection.  Moist MM.    Heart: RRR.  Irregularly irregular rhythm.  Murmur of mechanical valve. No rubs, gallops. JVD 12cm.   Lungs: dry bb crackles, no wheezing, rhonchi   Abdomen: BS+, soft, NT/ND.  No organomegaly.  Skin: Warm and dry   Extremities: trace-1+ edema bl up to level of knees   Neuro: A&Ox3         Labs:                        8.4    8.73  )-----------( 382      ( 07 Jan 2021 07:01 )             29.0     01-06    137  |  95<L>  |  46<H>  ----------------------------<  104<H>  4.3   |  32<H>  |  1.50<H>    Ca    10.1      06 Jan 2021 08:58      PT/INR - ( 06 Jan 2021 09:00 )   PT: 24.9 sec;   INR: 2.15 ratio         PTT - ( 07 Jan 2021 00:17 )  PTT:117.7 sec               **************************************************  Heart Failure Progress Note  Dr. Kavita Mckeon, PGY2  Resident, HF Service   NS Pager [phone]  **************************************************    Subjective:    Brief episode of self-limited epistaxis ON. This AM, no cp, sob, heart palpitations light headedness, orthopnea. Symptomatically improving from yesterday. ELIZABETH cancelled as patient not yet euvolemic.     Tele: afib 50-70s    Medications:  allopurinol 100 milliGRAM(s) Oral daily  budesonide 160 MICROgram(s)/formoterol 4.5 MICROgram(s) Inhaler 2 Puff(s) Inhalation two times a day  buMETAnide Injectable 2 milliGRAM(s) IV Push every 12 hours  heparin   Injectable 9000 Unit(s) IV Push every 6 hours PRN  heparin   Injectable 4000 Unit(s) IV Push every 6 hours PRN  heparin  Infusion. 1400 Unit(s)/Hr IV Continuous <Continuous>  metoprolol succinate ER 25 milliGRAM(s) Oral daily  simvastatin 10 milliGRAM(s) Oral at bedtime      Physical Exam:    Vital Signs Last 24 Hrs  T(C): 36.8 (07 Jan 2021 04:40), Max: 37.1 (06 Jan 2021 12:25)  T(F): 98.3 (07 Jan 2021 04:40), Max: 98.7 (06 Jan 2021 12:25)  HR: 68 (07 Jan 2021 05:59) (60 - 78)  BP: 111/68 (07 Jan 2021 05:59) (111/68 - 173/89)  BP(mean): --  RR: 18 (07 Jan 2021 04:40) (18 - 20)  SpO2: 98% (07 Jan 2021 04:40) (96% - 99%)  I&O's Summary    06 Jan 2021 07:01  -  07 Jan 2021 07:00  --------------------------------------------------------  IN: 610 mL / OUT: 652 mL / NET: -42 mL    General: elderly black female in NAD on 3L NC   HEENT: NCAT. No scleral icterus or injection.  Moist MM.    Heart: RRR.  Irregularly irregular rhythm.  Murmur of mechanical valve. No rubs, gallops. JVD 12cm.   Lungs: dry bb crackles, no wheezing, rhonchi   Abdomen: BS+, soft, NT/ND.  No organomegaly.  Skin: Warm and dry   Extremities: trace edema bl up to level of knees, improving from prior   Neuro: A&Ox3         Labs:                        8.4    8.73  )-----------( 382      ( 07 Jan 2021 07:01 )             29.0     01-06    137  |  95<L>  |  46<H>  ----------------------------<  104<H>  4.3   |  32<H>  |  1.50<H>    Ca    10.1      06 Jan 2021 08:58      PT/INR - ( 06 Jan 2021 09:00 )   PT: 24.9 sec;   INR: 2.15 ratio         PTT - ( 07 Jan 2021 00:17 )  PTT:117.7 sec

## 2021-01-08 DIAGNOSIS — R04.0 EPISTAXIS: ICD-10-CM

## 2021-01-08 DIAGNOSIS — Z95.2 PRESENCE OF PROSTHETIC HEART VALVE: ICD-10-CM

## 2021-01-08 DIAGNOSIS — I25.10 ATHEROSCLEROTIC HEART DISEASE OF NATIVE CORONARY ARTERY WITHOUT ANGINA PECTORIS: ICD-10-CM

## 2021-01-08 DIAGNOSIS — E78.5 HYPERLIPIDEMIA, UNSPECIFIED: ICD-10-CM

## 2021-01-08 DIAGNOSIS — D62 ACUTE POSTHEMORRHAGIC ANEMIA: ICD-10-CM

## 2021-01-08 DIAGNOSIS — I48.20 CHRONIC ATRIAL FIBRILLATION, UNSPECIFIED: ICD-10-CM

## 2021-01-08 DIAGNOSIS — N18.30 CHRONIC KIDNEY DISEASE, STAGE 3 UNSPECIFIED: ICD-10-CM

## 2021-01-08 DIAGNOSIS — E66.01 MORBID (SEVERE) OBESITY DUE TO EXCESS CALORIES: ICD-10-CM

## 2021-01-08 DIAGNOSIS — M10.9 GOUT, UNSPECIFIED: ICD-10-CM

## 2021-01-08 DIAGNOSIS — I27.20 PULMONARY HYPERTENSION, UNSPECIFIED: ICD-10-CM

## 2021-01-08 DIAGNOSIS — I49.5 SICK SINUS SYNDROME: ICD-10-CM

## 2021-01-08 DIAGNOSIS — Z99.81 DEPENDENCE ON SUPPLEMENTAL OXYGEN: ICD-10-CM

## 2021-01-08 DIAGNOSIS — I45.10 UNSPECIFIED RIGHT BUNDLE-BRANCH BLOCK: ICD-10-CM

## 2021-01-08 DIAGNOSIS — I50.33 ACUTE ON CHRONIC DIASTOLIC (CONGESTIVE) HEART FAILURE: ICD-10-CM

## 2021-01-08 DIAGNOSIS — G47.33 OBSTRUCTIVE SLEEP APNEA (ADULT) (PEDIATRIC): ICD-10-CM

## 2021-01-08 DIAGNOSIS — R55 SYNCOPE AND COLLAPSE: ICD-10-CM

## 2021-01-08 DIAGNOSIS — R07.9 CHEST PAIN, UNSPECIFIED: ICD-10-CM

## 2021-01-08 DIAGNOSIS — Z79.01 LONG TERM (CURRENT) USE OF ANTICOAGULANTS: ICD-10-CM

## 2021-01-08 DIAGNOSIS — Z86.73 PERSONAL HISTORY OF TRANSIENT ISCHEMIC ATTACK (TIA), AND CEREBRAL INFARCTION WITHOUT RESIDUAL DEFICITS: ICD-10-CM

## 2021-01-08 DIAGNOSIS — J44.9 CHRONIC OBSTRUCTIVE PULMONARY DISEASE, UNSPECIFIED: ICD-10-CM

## 2021-01-08 DIAGNOSIS — R42 DIZZINESS AND GIDDINESS: ICD-10-CM

## 2021-01-08 DIAGNOSIS — I13.0 HYPERTENSIVE HEART AND CHRONIC KIDNEY DISEASE WITH HEART FAILURE AND STAGE 1 THROUGH STAGE 4 CHRONIC KIDNEY DISEASE, OR UNSPECIFIED CHRONIC KIDNEY DISEASE: ICD-10-CM

## 2021-01-08 LAB
ANION GAP SERPL CALC-SCNC: 8 MMOL/L — SIGNIFICANT CHANGE UP (ref 5–17)
APTT BLD: 62.1 SEC — HIGH (ref 27.5–35.5)
BUN SERPL-MCNC: 66 MG/DL — HIGH (ref 7–23)
CALCIUM SERPL-MCNC: 9.9 MG/DL — SIGNIFICANT CHANGE UP (ref 8.4–10.5)
CHLORIDE SERPL-SCNC: 96 MMOL/L — SIGNIFICANT CHANGE UP (ref 96–108)
CO2 SERPL-SCNC: 35 MMOL/L — HIGH (ref 22–31)
CREAT SERPL-MCNC: 1.68 MG/DL — HIGH (ref 0.5–1.3)
GLUCOSE SERPL-MCNC: 107 MG/DL — HIGH (ref 70–99)
HCT VFR BLD CALC: 28.5 % — LOW (ref 34.5–45)
HGB BLD-MCNC: 7.9 G/DL — LOW (ref 11.5–15.5)
INR BLD: 2.5 RATIO — HIGH (ref 0.88–1.16)
MCHC RBC-ENTMCNC: 25.2 PG — LOW (ref 27–34)
MCHC RBC-ENTMCNC: 27.7 GM/DL — LOW (ref 32–36)
MCV RBC AUTO: 90.8 FL — SIGNIFICANT CHANGE UP (ref 80–100)
NRBC # BLD: 0 /100 WBCS — SIGNIFICANT CHANGE UP (ref 0–0)
PHOSPHATE SERPL-MCNC: 3.8 MG/DL — SIGNIFICANT CHANGE UP (ref 2.5–4.5)
PLATELET # BLD AUTO: 382 K/UL — SIGNIFICANT CHANGE UP (ref 150–400)
POTASSIUM SERPL-MCNC: 4.4 MMOL/L — SIGNIFICANT CHANGE UP (ref 3.5–5.3)
POTASSIUM SERPL-SCNC: 4.4 MMOL/L — SIGNIFICANT CHANGE UP (ref 3.5–5.3)
PROTHROM AB SERPL-ACNC: 28.7 SEC — HIGH (ref 10.6–13.6)
RBC # BLD: 3.14 M/UL — LOW (ref 3.8–5.2)
RBC # FLD: 16.4 % — HIGH (ref 10.3–14.5)
SODIUM SERPL-SCNC: 139 MMOL/L — SIGNIFICANT CHANGE UP (ref 135–145)
WBC # BLD: 8.67 K/UL — SIGNIFICANT CHANGE UP (ref 3.8–10.5)
WBC # FLD AUTO: 8.67 K/UL — SIGNIFICANT CHANGE UP (ref 3.8–10.5)

## 2021-01-08 PROCEDURE — 99232 SBSQ HOSP IP/OBS MODERATE 35: CPT

## 2021-01-08 PROCEDURE — 99233 SBSQ HOSP IP/OBS HIGH 50: CPT

## 2021-01-08 PROCEDURE — 93306 TTE W/DOPPLER COMPLETE: CPT | Mod: 26

## 2021-01-08 RX ORDER — WARFARIN SODIUM 2.5 MG/1
5 TABLET ORAL ONCE
Refills: 0 | Status: COMPLETED | OUTPATIENT
Start: 2021-01-08 | End: 2021-01-08

## 2021-01-08 RX ADMIN — BUDESONIDE AND FORMOTEROL FUMARATE DIHYDRATE 2 PUFF(S): 160; 4.5 AEROSOL RESPIRATORY (INHALATION) at 06:44

## 2021-01-08 RX ADMIN — HEPARIN SODIUM 1000 UNIT(S)/HR: 5000 INJECTION INTRAVENOUS; SUBCUTANEOUS at 06:43

## 2021-01-08 RX ADMIN — BUMETANIDE 2 MILLIGRAM(S): 0.25 INJECTION INTRAMUSCULAR; INTRAVENOUS at 06:45

## 2021-01-08 RX ADMIN — SIMVASTATIN 10 MILLIGRAM(S): 20 TABLET, FILM COATED ORAL at 22:16

## 2021-01-08 RX ADMIN — Medication 1 APPLICATION(S): at 18:28

## 2021-01-08 RX ADMIN — Medication 2 SPRAY(S): at 06:45

## 2021-01-08 RX ADMIN — Medication 1 TABLET(S): at 18:18

## 2021-01-08 RX ADMIN — Medication 1 APPLICATION(S): at 06:44

## 2021-01-08 RX ADMIN — Medication 2 SPRAY(S): at 10:49

## 2021-01-08 RX ADMIN — Medication 100 MILLIGRAM(S): at 10:49

## 2021-01-08 RX ADMIN — Medication 2 SPRAY(S): at 22:16

## 2021-01-08 RX ADMIN — BUDESONIDE AND FORMOTEROL FUMARATE DIHYDRATE 2 PUFF(S): 160; 4.5 AEROSOL RESPIRATORY (INHALATION) at 18:19

## 2021-01-08 RX ADMIN — Medication 2 SPRAY(S): at 18:19

## 2021-01-08 RX ADMIN — Medication 1 TABLET(S): at 06:44

## 2021-01-08 RX ADMIN — BUMETANIDE 2 MILLIGRAM(S): 0.25 INJECTION INTRAMUSCULAR; INTRAVENOUS at 18:23

## 2021-01-08 RX ADMIN — Medication 25 MILLIGRAM(S): at 06:44

## 2021-01-08 RX ADMIN — WARFARIN SODIUM 5 MILLIGRAM(S): 2.5 TABLET ORAL at 22:16

## 2021-01-08 NOTE — PROGRESS NOTE ADULT - SUBJECTIVE AND OBJECTIVE BOX
afebrile/  still with sob    REVIEW OF SYSTEMS:  GEN: no fever,    no chills  RESP: no SOB,   no cough  CVS: no chest pain,   no palpitations  GI: no abdominal pain,   no nausea,   no vomiting,   no constipation,   no diarrhea  : no dysuria,   no frequency  NEURO: no headache,   no dizziness  PSYCH: no depression,   not anxious  Derm : no rash    MEDICATIONS  (STANDING):  allopurinol 100 milliGRAM(s) Oral daily  amoxicillin  500 milliGRAM(s)/clavulanate 1 Tablet(s) Oral every 12 hours  BACItracin   Ointment 1 Application(s) Topical two times a day  budesonide 160 MICROgram(s)/formoterol 4.5 MICROgram(s) Inhaler 2 Puff(s) Inhalation two times a day  buMETAnide Injectable 2 milliGRAM(s) IV Push every 12 hours  heparin  Infusion. 1400 Unit(s)/Hr (14 mL/Hr) IV Continuous <Continuous>  metoprolol succinate ER 25 milliGRAM(s) Oral daily  simvastatin 10 milliGRAM(s) Oral at bedtime  sodium chloride 0.65% Nasal 2 Spray(s) Both Nostrils four times a day    MEDICATIONS  (PRN):  heparin   Injectable 9000 Unit(s) IV Push every 6 hours PRN For aPTT less than 40  heparin   Injectable 4000 Unit(s) IV Push every 6 hours PRN For aPTT between 40 - 57      Vital Signs Last 24 Hrs  T(C): 37.2 (08 Jan 2021 04:33), Max: 37.3 (07 Jan 2021 20:06)  T(F): 99 (08 Jan 2021 04:33), Max: 99.1 (07 Jan 2021 20:06)  HR: 99 (08 Jan 2021 06:41) (57 - 99)  BP: 123/84 (08 Jan 2021 06:41) (123/84 - 157/74)  BP(mean): --  RR: 18 (08 Jan 2021 04:33) (18 - 18)  SpO2: 94% (08 Jan 2021 04:33) (94% - 100%)  CAPILLARY BLOOD GLUCOSE        I&O's Summary    07 Jan 2021 07:01  -  08 Jan 2021 07:00  --------------------------------------------------------  IN: 780 mL / OUT: 1450 mL / NET: -670 mL        PHYSICAL EXAM:  HEAD:  Atraumatic, Normocephalic  NECK: Supple, No   JVD  CHEST/LUNG:   no     rales,     no,    rhonchi  HEART: Regular rate and rhythm;         murmur  ABDOMEN: Soft, Nontender, ;   EXTREMITIES:    less    edema  NEUROLOGY:  alert    LABS:                        7.9    8.67  )-----------( 382      ( 08 Jan 2021 05:57 )             28.5     01-08    139  |  96  |  66<H>  ----------------------------<  107<H>  4.4   |  35<H>  |  1.68<H>    Ca    9.9      08 Jan 2021 05:57  Phos  3.8     01-08  Mg     2.3     01-07      PT/INR - ( 08 Jan 2021 05:57 )   PT: 28.7 sec;   INR: 2.50 ratio         PTT - ( 08 Jan 2021 05:57 )  PTT:62.1 sec                Thyroid Stimulating Hormone, Serum: 1.630 uIU/mL (01-02 @ 06:57)          Consultant(s) Notes Reviewed:      Care Discussed with Consultants/Other Providers:

## 2021-01-08 NOTE — PROGRESS NOTE ADULT - PROBLEM SELECTOR PLAN 1
d/c abx  - Strict blood pressure control.  - trim NC prongs  - Nasal saline, 2 sprays to both nares 4 times a day  - baci  - Avoid nasal trauma; no nose rubbing, blowing or manipulating nasal packing.  Sneeze with mouth open and pinching nares.  - Avoid bending with head blow the waist.    -No heavy lifting  - no further ent intervention at this time

## 2021-01-08 NOTE — DIETITIAN INITIAL EVALUATION ADULT. - OTHER INFO
pt NPO for possible pending ELIZABETH as per nurse. pt was too high risk when she was initially taken for test.   Intake : 25%  Denies nausea/vomit :   Denies difficulty swallow : pt needs beef chopped up. pt agreed to change diet to Mechanical Soft due to top dentures.   Denies diarrhea/constipation:  Last BM : yesterday  NKFA  IBW +/- 10%= 120pounds  Ht: 64"  Ht taken from pt   Dosing ht: 64"  Usual Weight PTA: 240pounds  Dosing wt: 240.9pounds  BMI: 40.6  BMI calculated using wt from flow sheet  BMI calculated using ht from pt  wt used to calculate needs: IBW +10%= 132pounds  Education Provided : pt was educated on the importance of supplements to increase calorie and protein intake in light of RD's nutritional findings.   pressure injury: none  edema: +2 left leg, right leg, left ankle, right ankle pt NPO for possible pending ELIZABETH as per nurse. pt was too high risk when she was initially taken for test.   Intake : 25%  Denies nausea/vomit :   Denies difficulty swallow : pt needs beef chopped up. pt agreed to change diet to Mechanical Soft due to top dentures.   Denies diarrhea/constipation:  Last BM : yesterday  NKFA  IBW +/- 10%= 120pounds  Ht: 64"  Ht taken from pt   Dosing ht: 64"  Usual Weight PTA: 240pounds  Dosing wt: 240.9pounds  BMI: 40.6  BMI calculated using wt from flow sheet  BMI calculated using ht from pt  wt used to calculate needs: IBW +10%= 132pounds  Education Provided : pt was educated on the importance of supplements to increase calorie and protein intake in light of RD's nutritional findings. Coumadin.  pressure injury: none  edema: +2 left leg, right leg, left ankle, right ankle

## 2021-01-08 NOTE — PROGRESS NOTE ADULT - ASSESSMENT
70 years      h/o   CAD,  mechanical MVR and Tricuspid valve repair,        h/o  chronic   HFpEF,   HTN  . MIGUEL ,     severe MR  ,  s/p mechanical MVR 1999 , [facility] with Dr. Anderson,  and severe TR s/p TVR 2012 ([facility]),       COPD ,on home 02; no prior tobacco use      AFib on Coumadin , failed  ablation,   ,  PHTN,   CKD 3 (b/l SCr ~1.3-1.6),  MIGUEL and gout.     h/o   hypercarbia and she reports  needing Bipap,in the past,  but hasn't used in 10 years.      Morbid  obesity,  BMI is 41     presented with  CP,  dizziness and sob     She also had epistaxis at [facility],/         1.,  Atrial fibrillation       metoprolol ER 25 mg  / lipitor     2.  MVR  mechanical,1999  Continue IV heparin and warfarin with goal INR of 2.5-3.5 with mechanical MVR   daily INR   3.  TVR, 2012 at [facility]   4.  HTN, on Toprol   5. Dizziness , since resolved  Ct head , with infarcts, Right b. ganglia and Right cerebellum   6. .  Anemia,  has  c/c  anemia, , baseline is  8  to 9/   last colonoscopy  was  10 yrs  ago/ s/p melena last yr. and, no w/p was done.,as  pt was  deemed high risk  for  any procedure  7.  Acute  Diastolic  Chf    on  iv bumex bid       daily weights/ follow   I/O//   still  with sob on exertion  pt has lost  2 Kg  8.  Severe Pulm Htn, on echo  Echo  12/20,normal  ef/mod  MS/  severe Pulm Htn  per card dr mcnamara , pt   transferred to  [facility]   for ELIZABETH/ ? fistula  from aorta  to RV/   follow  daily  INR  heart  failure team following pt   ELIZABETH, timing as per  chf team  per chart, pt had  epistaxis  earlier/  ENT eval noted       < from: CT Head No Cont (12.29.20 @ 20:41)   IMPRESSION: No acute intracranial hemorrhage. Age-indeterminate right basal ganglia lacunar infarct and right cerebellar lacunar infarct. If there is clinical suspicion for acute infarct, consider brain MRI if there is no contraindication.  < end of copied text >      < from: TTE Echo Complete w/ Contrast w/ Doppler (12.31.20 @ 14:01) >  ummary:   1. Left ventricular ejection fraction, by visual estimation, is 60 to 65%.   2. Technically suboptimal study.   3. Normal global left ventricular systolic function.   4. Normal left ventricular internal cavity size.   5. Spectral Doppler shows pseudonormal pattern of left ventricular myocardial filling (Grade II diastolic dysfunction).   6. Normal right ventricular size and function.   7. Mild to moderately enlarged left atrium.   8. There is no evidence of pericardial effusion.   9. Mild mitral annular calcification.  10. Mild mitral valve regurgitation.  11. Mild thickening and calcification of the anterior and posterior mitral valve leaflets.  12. Peak transmitral mean gradient equals 6.8 mmHg, calculated mitral valve area by pressure half time equals 1.86 cm² consistent withmild mitral stenosis.  13. Mild tricuspid regurgitation.  14. Mild aortic regurgitation.  15. Sclerotic aortic valve with normal opening.  16. Estimated pulmonary artery systolic pressure is 68.4 mmHg assuming a right atrial pressure of 8 mmHg, whichis consistent with severe pulmonary hypertension.  17. C/w the study of 6-21-20, more significant pulmonary htn is now noted.  18. Endocardial visualization was enhanced with intravenous echo contrast.  19. Mitral valve mean gradient is 6.8 mmHg consistent with moderate mitral stenosis.  Montana Barreto MD FACC, FASRUBIN, FAC  < end of copied text >                	   70 years      h/o   CAD,  mechanical MVR and Tricuspid valve repair,        h/o  chronic   HFpEF,   HTN  . MIGUEL ,     severe MR  ,  s/p mechanical MVR 1999 , [facility] with Dr. Anderson,  and severe TR s/p TVR 2012 ([facility]),       COPD ,on home 02; no prior tobacco use      AFib on Coumadin , failed  ablation,   ,  PHTN,   CKD 3 (b/l SCr ~1.3-1.6),  MIGUEL and gout.     h/o   hypercarbia and she reports  needing Bipap,in the past,  but hasn't used in 10 years.      Morbid  obesity,  BMI is 41     presented with  CP,  dizziness and sob     She also had epistaxis at [facility],/         1.,  Atrial fibrillation       metoprolol ER 25 mg  / lipitor     2.  MVR  mechanical,1999  Continue IV heparin and warfarin with goal INR of 2.5-3.5 with mechanical MVR   daily INR   3.  TVR, 2012 at [facility]   4.  HTN, on Toprol   5. Dizziness , since resolved  Ct head , with infarcts, Right b. ganglia and Right cerebellum   6. .  Anemia,  has  c/c  anemia, , baseline is  8  to 9/   last colonoscopy  was  10 yrs  ago/ s/p melena last yr. and, no w/p was done.,as  pt was  deemed high risk  for  any procedure  7.  Acute  Diastolic  Chf    on  iv bumex bid       daily weights/ follow   I/O//   still  with sob on exertion  pt has lost  2 Kg  8.  Severe Pulm Htn, on echo  Echo  12/20,normal  ef/mod  MS/  severe Pulm Htn  per card dr mcnamara , pt   transferred to  [place]   for ELIZABETH/ ? fistula  from aorta  to RV/   follow  daily  INR  heart  failure team following pt   ELIZABETH,  cancelled  per  floor  by echo dept/  spoke  with  chf          < from: CT Head No Cont (12.29.20 @ 20:41)   IMPRESSION: No acute intracranial hemorrhage. Age-indeterminate right basal ganglia lacunar infarct and right cerebellar lacunar infarct. If there is clinical suspicion for acute infarct, consider brain MRI if there is no contraindication.  < end of copied text >      < from: TTE Echo Complete w/ Contrast w/ Doppler (12.31.20 @ 14:01) >  ummary:   1. Left ventricular ejection fraction, by visual estimation, is 60 to 65%.   2. Technically suboptimal study.   3. Normal global left ventricular systolic function.   4. Normal left ventricular internal cavity size.   5. Spectral Doppler shows pseudonormal pattern of left ventricular myocardial filling (Grade II diastolic dysfunction).   6. Normal right ventricular size and function.   7. Mild to moderately enlarged left atrium.   8. There is no evidence of pericardial effusion.   9. Mild mitral annular calcification.  10. Mild mitral valve regurgitation.  11. Mild thickening and calcification of the anterior and posterior mitral valve leaflets.  12. Peak transmitral mean gradient equals 6.8 mmHg, calculated mitral valve area by pressure half time equals 1.86 cm² consistent withmild mitral stenosis.  13. Mild tricuspid regurgitation.  14. Mild aortic regurgitation.  15. Sclerotic aortic valve with normal opening.  16. Estimated pulmonary artery systolic pressure is 68.4 mmHg assuming a right atrial pressure of 8 mmHg, whichis consistent with severe pulmonary hypertension.  17. C/w the study of 6-21-20, more significant pulmonary htn is now noted.  18. Endocardial visualization was enhanced with intravenous echo contrast.  19. Mitral valve mean gradient is 6.8 mmHg consistent with moderate mitral stenosis.  Montana Barreto MD FACC, ANGELA, FAC  < end of copied text >

## 2021-01-08 NOTE — PROGRESS NOTE ADULT - PROBLEM SELECTOR PLAN 2
s/p mechanical TVR 2012 at [facility]  -Severe functional TR on TTE 12/31  -On hep bridge to coumadin

## 2021-01-08 NOTE — PROGRESS NOTE ADULT - SUBJECTIVE AND OBJECTIVE BOX
Daily In-House Cardiology Progress Note  -------------------------------------------------------    24-Hour Events/Subjective:      -R nostril anterior packing removed.  -No longer feeling SOB. Able to have full conversation without significant dyspnea.    ROS:   Constitutional: No Fatigue, weakness, fever, chills  HEENT: No sore throat, dryness, hoarseness, congestion  Cardiovascular: No chest pain, SOB, palpitations, VILLA, lightheadedness, dizziness  Respiratory: No wheezing, cough, phlegm  GI: No nausea, vomiting, diarrhea, poor PO tolerance, dyspepsia, dysphagia  Musculoskeletal: No myalgias, arthralgias, joint swelling, back pain  Extremities: No swelling, coldness  Skin: No rashes, lesions, pruritis   Neuro: No weakness, confusion, blurry vision  Psych: No anxiety, depression    Current Meds:  allopurinol 100 milliGRAM(s) Oral daily  amoxicillin  500 milliGRAM(s)/clavulanate 1 Tablet(s) Oral every 12 hours  BACItracin   Ointment 1 Application(s) Topical two times a day  budesonide 160 MICROgram(s)/formoterol 4.5 MICROgram(s) Inhaler 2 Puff(s) Inhalation two times a day  buMETAnide Injectable 2 milliGRAM(s) IV Push every 12 hours  heparin   Injectable 9000 Unit(s) IV Push every 6 hours PRN  heparin   Injectable 4000 Unit(s) IV Push every 6 hours PRN  heparin  Infusion. 1400 Unit(s)/Hr IV Continuous <Continuous>  metoprolol succinate ER 25 milliGRAM(s) Oral daily  simvastatin 10 milliGRAM(s) Oral at bedtime  sodium chloride 0.65% Nasal 2 Spray(s) Both Nostrils four times a day  warfarin 5 milliGRAM(s) Oral once    Vitals:  T(F): 98.6 (01-08), Max: 99.1 (01-07)  HR: 78 (01-08) (57 - 99)  BP: 138/69 (01-08) (123/84 - 157/74)  RR: 18 (01-08)  SpO2: 100% (01-08)    I&O's Summary  07 Jan 2021 07:01  -  08 Jan 2021 07:00  --------------------------------------------------------  IN: 780 mL / OUT: 1450 mL / NET: -670 mL    08 Jan 2021 07:01  -  08 Jan 2021 13:46  --------------------------------------------------------  IN: 0 mL / OUT: 250 mL / NET: -250 mL    Physical Exam:  General: elderly black female in NAD  HEENT: NCAT. No scleral icterus or injection.  Moist MM.    Heart: RRR.  Irregularly irregular rhythm.  Murmur of mechanical valve. No rubs, gallops. JVD 12cm.   Lungs: dry bb crackles, no wheezing, rhonchi   Abdomen: BS+, soft, NT/ND.  No organomegaly.  Skin: Warm and dry   Extremities: trace edema bl up to level of knees, improving from prior   Neuro: A&Ox3                         7.9    8.67  )-----------( 382      ( 08 Jan 2021 05:57 )             28.5     01-08    139  |  96  |  66<H>  ----------------------------<  107<H>  4.4   |  35<H>  |  1.68<H>    Ca    9.9      08 Jan 2021 05:57  Phos  3.8     01-08  Mg     2.3     01-07      PT/INR - ( 08 Jan 2021 05:57 )   PT: 28.7 sec;   INR: 2.50 ratio         PTT - ( 08 Jan 2021 05:57 )  PTT:62.1 sec

## 2021-01-08 NOTE — DIETITIAN NUTRITION RISK NOTIFICATION - TREATMENT: THE FOLLOWING DIET HAS BEEN RECOMMENDED
Diet, NPO after Midnight:      NPO Start Date: 07-Jan-2021,   NPO Start Time: 23:59  Except Medications (01-07-21 @ 12:20) [Active]  Diet, Regular:   1500mL Fluid Restriction ([id number]) (01-06-21 @ 16:10) [Active]

## 2021-01-08 NOTE — DIETITIAN INITIAL EVALUATION ADULT. - EDUCATION DIETARY MODIFICATIONS
teach back/(1) partially meets; needs review/practice/verbalization "I have chronic pain all over the place"

## 2021-01-08 NOTE — PROGRESS NOTE ADULT - PROBLEM SELECTOR PLAN 1
-Switch to oral regimen today; skip PM dose, and start Bumex 1mg PO BID tomorrow AM.  -Fluid restrict 1.5 L, daily standing weights   -Strict Is and Os, consider external female catheter for more accurate output recording  -Will pursue surface echo today again for bubble study and additional echo feature s to attempt to non-invasively discern if there is the presence of IVS defect. Will defer ELIZABETH until that time.

## 2021-01-08 NOTE — PROGRESS NOTE ADULT - SUBJECTIVE AND OBJECTIVE BOX
CC: epistaxis     HPI:  Patient is a 70y old  Female who presents with a chief complaint of chf. Pt h/o CAD, mechanical MVR and Tricuspid valve repair, h/o  chronic   HFpEF,   HTN, MIGUEL, severe MR, s/p mechanical MVR 1999, severe TR s/p TVR 2012 ([facility]), COPD ,on home 02; no prior tobacco use, AFib on Coumadin , failed  ablation, CKD 3, MIGUEL and gout. h/o hypercarbia and she reports  needing Bipap,in the past, but hasn't used in 10 years. Morbid obesity,  BMI is 41. presented with  CP,  dizziness and sob to [facility]. She also had epistaxis requiring packed red blood cell support and right nare packed with RR. ENT called for eval of right epistaxis overnight that spontaneously resolved on its on. Pt on NC and AC. Pt denies Pt denies h/a, recent trauma, URI, congestion, facial pain, facial tenderness, rhinorrhea, PND or changes in vision    PAST MEDICAL & SURGICAL HISTORY:  COPD (chronic obstructive pulmonary disease)    Hypertension    CHF (congestive heart failure)    Tricuspid valve replaced    Mitral valve replaced    Gout    HTN (hypertension)    CHF (congestive heart failure)    COPD (chronic obstructive pulmonary disease)  on home O2    MIGUEL (obstructive sleep apnea)    Pulmonary hypertension    Atrial fibrillation  S/P Ablation    No significant past surgical history    Tricuspid valve replaced  mechanical    History of mitral valve replacement with mechanical valve      Allergies    No Known Allergies    Intolerances      MEDICATIONS  (STANDING):  allopurinol 100 milliGRAM(s) Oral daily  amoxicillin  500 milliGRAM(s)/clavulanate 1 Tablet(s) Oral every 12 hours  BACItracin   Ointment 1 Application(s) Topical two times a day  budesonide 160 MICROgram(s)/formoterol 4.5 MICROgram(s) Inhaler 2 Puff(s) Inhalation two times a day  buMETAnide Injectable 2 milliGRAM(s) IV Push every 12 hours  heparin  Infusion. 1400 Unit(s)/Hr (14 mL/Hr) IV Continuous <Continuous>  metoprolol succinate ER 25 milliGRAM(s) Oral daily  simvastatin 10 milliGRAM(s) Oral at bedtime  sodium chloride 0.65% Nasal 2 Spray(s) Both Nostrils four times a day  warfarin 5 milliGRAM(s) Oral once    MEDICATIONS  (PRN):  heparin   Injectable 9000 Unit(s) IV Push every 6 hours PRN For aPTT less than 40  heparin   Injectable 4000 Unit(s) IV Push every 6 hours PRN For aPTT between 40 - 57      social history: see consult     family history: see consult     ROS:   ENT: all negative except as noted in HPI   Pulm: denies SOB, cough, hemoptysis  Neuro: denies numbness/tingling, loss of sensation  Endo: denies heat/cold intolerance, excessive sweating      Vital Signs Last 24 Hrs  T(C): 37.2 (08 Jan 2021 04:33), Max: 37.3 (07 Jan 2021 20:06)  T(F): 99 (08 Jan 2021 04:33), Max: 99.1 (07 Jan 2021 20:06)  HR: 99 (08 Jan 2021 06:41) (57 - 99)  BP: 146/72 (08 Jan 2021 08:50) (123/84 - 157/74)  BP(mean): --  RR: 18 (08 Jan 2021 04:33) (18 - 18)  SpO2: 94% (08 Jan 2021 04:33) (94% - 100%)                          7.9    8.67  )-----------( 382      ( 08 Jan 2021 05:57 )             28.5    01-08    139  |  96  |  66<H>  ----------------------------<  107<H>  4.4   |  35<H>  |  1.68<H>    Ca    9.9      08 Jan 2021 05:57  Phos  3.8     01-08  Mg     2.3     01-07     PT/INR - ( 08 Jan 2021 05:57 )   PT: 28.7 sec;   INR: 2.50 ratio         PTT - ( 08 Jan 2021 05:57 )  PTT:62.1 sec    PHYSICAL EXAM:  Gen: NAD  Skin: No rashes, bruises, or lesions  Head: Normocephalic, Atraumatic  Face: no edema, erythema, or fluctuance. Parotid glands soft without mass  Eyes: no scleral injection  Nose: R RR removed   Mouth: No Stridor / Drooling / Trismus.  Mucosa moist, tongue/uvula midline, oropharynx clear  Neck: Flat, supple, no lymphadenopathy, trachea midline, no masses  Lymphatic: No lymphadenopathy  Resp: breathing easily, no stridor  Neuro: facial nerve intact, no facial droop

## 2021-01-08 NOTE — DIETITIAN INITIAL EVALUATION ADULT. - PERTINENT MEDS FT
MEDICATIONS  (STANDING):  allopurinol 100 milliGRAM(s) Oral daily  amoxicillin  500 milliGRAM(s)/clavulanate 1 Tablet(s) Oral every 12 hours  BACItracin   Ointment 1 Application(s) Topical two times a day  budesonide 160 MICROgram(s)/formoterol 4.5 MICROgram(s) Inhaler 2 Puff(s) Inhalation two times a day  buMETAnide Injectable 2 milliGRAM(s) IV Push every 12 hours  heparin  Infusion. 1400 Unit(s)/Hr (14 mL/Hr) IV Continuous <Continuous>  metoprolol succinate ER 25 milliGRAM(s) Oral daily  simvastatin 10 milliGRAM(s) Oral at bedtime  sodium chloride 0.65% Nasal 2 Spray(s) Both Nostrils four times a day  warfarin 5 milliGRAM(s) Oral once    MEDICATIONS  (PRN):  heparin   Injectable 9000 Unit(s) IV Push every 6 hours PRN For aPTT less than 40  heparin   Injectable 4000 Unit(s) IV Push every 6 hours PRN For aPTT between 40 - 57

## 2021-01-08 NOTE — PROGRESS NOTE ADULT - ASSESSMENT
71 yo female with a PMHx of CAD, HFpEF (EF 65-60%), severe MR s/p mechanical MVR 1999 (Dr. Anderson, [facility]), severe TR s/p mechanical TVR (2012 [facility]), afib s/p ablation, HTN, HLD, CKD (bl 1.35-1.5), COPD on 3L NC at home, MIGUEL who presented to the [facility] for cp, sob, dizziness x 1 week, found to be in decompensated HFpEF, w/ tachy-ofelia rhythm. Seen by Dr. Thrasher Cardiology at [facility]. s/p IV diuresis w/ lasix BID. TTE 12/31 reviewed by [facility] Dr. Escobar, c/f possible L-R shunt, ?fistula between aortic valve and RV. Patient transferred to [facility] for ELIZABETH and escalation of care. Remains hypervolemic. Continued on IV diuresis.     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

## 2021-01-08 NOTE — PROGRESS NOTE ADULT - SUBJECTIVE AND OBJECTIVE BOX
Patient is a 70y old  Female who presented with a chief complaint of chf/ chf team (08 Jan 2021 10:14)      INTERVAL HPI/OVERNIGHT EVENTS:  no abdominal pain, nausea or vomiting  no further epistaxis - rhino-rocket now d/c'ed  BM yesterday - brown  no CP - SOB sl improved but still present, still with orthopnea- though sl improved    MEDICATIONS  (STANDING):  allopurinol 100 milliGRAM(s) Oral daily  amoxicillin  500 milliGRAM(s)/clavulanate 1 Tablet(s) Oral every 12 hours  BACItracin   Ointment 1 Application(s) Topical two times a day  budesonide 160 MICROgram(s)/formoterol 4.5 MICROgram(s) Inhaler 2 Puff(s) Inhalation two times a day  buMETAnide Injectable 2 milliGRAM(s) IV Push every 12 hours  heparin  Infusion. 1400 Unit(s)/Hr (14 mL/Hr) IV Continuous <Continuous>  metoprolol succinate ER 25 milliGRAM(s) Oral daily  simvastatin 10 milliGRAM(s) Oral at bedtime  sodium chloride 0.65% Nasal 2 Spray(s) Both Nostrils four times a day  warfarin 5 milliGRAM(s) Oral once    MEDICATIONS  (PRN):  heparin   Injectable 9000 Unit(s) IV Push every 6 hours PRN For aPTT less than 40  heparin   Injectable 4000 Unit(s) IV Push every 6 hours PRN For aPTT between 40 - 57      Allergies  No Known Allergies      Review of Systems:  General:  No wt loss, fevers, chills, night sweats  CV:  No current pain, see HPI  Resp:  No dyspnea, cough, tachypnea,  +SOB +COPD - on home O2  GI:  No pain, No nausea, No vomiting, No diarrhea, No constipation, No weight loss, No fever, No pruritis, No rectal bleeding, No tarry stools, No dysphagia,  :  No pain, bleeding, incontinence, nocturia  Muscle:  No pain, weakness  Neuro:  No focal weakness, tingling, memory problems  Psych:  No fatigue, insomnia, mood problems, depression  Endocrine:  No polyuria, polydypsia, cold/heat intolerance  Heme:  No petechiae, ecchymosis, easy bruisability  +AC +s/p recent epistaxis  Skin:  No rash, tattoos, scars, +edema    Vital Signs Last 24 Hrs  T(C): 37.2 (08 Jan 2021 04:33), Max: 37.3 (07 Jan 2021 20:06)  T(F): 99 (08 Jan 2021 04:33), Max: 99.1 (07 Jan 2021 20:06)  HR: 99 (08 Jan 2021 06:41) (57 - 99)  BP: 146/72 (08 Jan 2021 08:50) (123/84 - 157/74)  BP(mean): --  RR: 18 (08 Jan 2021 04:33) (18 - 18)  SpO2: 94% (08 Jan 2021 04:33) (94% - 100%)    PHYSICAL EXAM:    Constitutional: NAD, morbidly obese AA female on supplemental oxygen sitting at edge of bed  Neck: No LAD, supple +JVD  Respiratory: basilar rales, no wheeze  Cardiovascular: S1 and S2, RRR,  +mechanical click  Gastrointestinal: BS+, obese soft, NT/ND  Extremities: +edema b/l  neg clubbing, cyanosis  Vascular: 2+ peripheral pulses  Neurological: A/O x 3, no focal deficits  Psychiatric: Normal mood, normal affect  Skin: No rashes, anicteric      LABS:                        7.9    8.67  )-----------( 382      ( 08 Jan 2021 05:57 )             28.5     01-08    139  |  96  |  66<H>  ----------------------------<  107<H>  4.4   |  35<H>  |  1.68<H>    Ca    9.9      08 Jan 2021 05:57  Phos  3.8     01-08  Mg     2.3     01-07      PT/INR - ( 08 Jan 2021 05:57 )   PT: 28.7 sec;   INR: 2.50 ratio         PTT - ( 08 Jan 2021 05:57 )  PTT:62.1 sec        RADIOLOGY & ADDITIONAL TESTS:

## 2021-01-08 NOTE — PROGRESS NOTE ADULT - PROBLEM SELECTOR PLAN 3
s/p mechanical MVR 1999 w/ Dr. Anderson at [facility], no revisions   -min MR TTE 12/31  -plan as above.

## 2021-01-08 NOTE — PROGRESS NOTE ADULT - ASSESSMENT
69 yo female with a PMHx of Morbid Obesity, CAD, HFpEF (EF 65-60%), severe MR s/p mechanical MVR 1999 (Dr. Anderson, [facility]), severe TR s/p mechanical TVR (2012 [facility]), afib s/p ablation, HTN, HLD, CKD (bl 1.35-1.5), COPD on 3L NC at home, MIGUEL who presented to the [facility] for cp, sob, dizziness x 1 week, found to be in decompensated HFpEF, w/ tachy-ofelia rhythm  s/p epistaxis with control of bleeding with nasal packing  Known to have anemia with prior history of GIB 12/2019 - too high risk for endoscopic procedures at that time.  Currently without overt/brisk GI bleed    RECS:  -Diurese per Heart Failure team  -AC per primary team  -no role for invasive GI evaluation at this time  -PPI for GI prophylaxis  -monitor CBC and BMs (though given significant recent epistaxis, stool may be black from swallowed blood)    Further care per primary team    Please call over weekend prn with acute GI concerns   GI service : [phone]    Pan Lawson PA-C    [facility]  [phone]  After hours and weekend coverage [phone]

## 2021-01-08 NOTE — DIETITIAN INITIAL EVALUATION ADULT. - ADD RECOMMEND
advance diet within 24-48 hours. when advanced recommend Mechanical Soft, DASH. add ensure 1 x daily. advance diet within 24-48 hours. when advanced recommend Mechanical Soft, DASH. add ensure 1 x daily. monitor need for Coumadin ed reinforcement. advance diet within 24-48 hours. when advanced recommend Mechanical Soft, DASH. add ensure 1 x daily. monitor need for Coumadin ed reinforcement. BMI>40 sticker placed.

## 2021-01-09 LAB
ANION GAP SERPL CALC-SCNC: 10 MMOL/L — SIGNIFICANT CHANGE UP (ref 5–17)
APTT BLD: 84.9 SEC — HIGH (ref 27.5–35.5)
BUN SERPL-MCNC: 64 MG/DL — HIGH (ref 7–23)
CALCIUM SERPL-MCNC: 9.5 MG/DL — SIGNIFICANT CHANGE UP (ref 8.4–10.5)
CHLORIDE SERPL-SCNC: 97 MMOL/L — SIGNIFICANT CHANGE UP (ref 96–108)
CO2 SERPL-SCNC: 34 MMOL/L — HIGH (ref 22–31)
CREAT SERPL-MCNC: 1.54 MG/DL — HIGH (ref 0.5–1.3)
GLUCOSE SERPL-MCNC: 101 MG/DL — HIGH (ref 70–99)
HCT VFR BLD CALC: 28.4 % — LOW (ref 34.5–45)
HGB BLD-MCNC: 8.2 G/DL — LOW (ref 11.5–15.5)
INR BLD: 2.67 RATIO — HIGH (ref 0.88–1.16)
MCHC RBC-ENTMCNC: 26.3 PG — LOW (ref 27–34)
MCHC RBC-ENTMCNC: 28.9 GM/DL — LOW (ref 32–36)
MCV RBC AUTO: 91 FL — SIGNIFICANT CHANGE UP (ref 80–100)
NRBC # BLD: 0 /100 WBCS — SIGNIFICANT CHANGE UP (ref 0–0)
PLATELET # BLD AUTO: 372 K/UL — SIGNIFICANT CHANGE UP (ref 150–400)
POTASSIUM SERPL-MCNC: 4.2 MMOL/L — SIGNIFICANT CHANGE UP (ref 3.5–5.3)
POTASSIUM SERPL-SCNC: 4.2 MMOL/L — SIGNIFICANT CHANGE UP (ref 3.5–5.3)
PROTHROM AB SERPL-ACNC: 30.6 SEC — HIGH (ref 10.6–13.6)
RBC # BLD: 3.12 M/UL — LOW (ref 3.8–5.2)
RBC # FLD: 16.6 % — HIGH (ref 10.3–14.5)
SODIUM SERPL-SCNC: 141 MMOL/L — SIGNIFICANT CHANGE UP (ref 135–145)
WBC # BLD: 7.81 K/UL — SIGNIFICANT CHANGE UP (ref 3.8–10.5)
WBC # FLD AUTO: 7.81 K/UL — SIGNIFICANT CHANGE UP (ref 3.8–10.5)

## 2021-01-09 PROCEDURE — 99233 SBSQ HOSP IP/OBS HIGH 50: CPT

## 2021-01-09 RX ORDER — WARFARIN SODIUM 2.5 MG/1
5 TABLET ORAL ONCE
Refills: 0 | Status: COMPLETED | OUTPATIENT
Start: 2021-01-09 | End: 2021-01-09

## 2021-01-09 RX ORDER — BUMETANIDE 0.25 MG/ML
1 INJECTION INTRAMUSCULAR; INTRAVENOUS EVERY 12 HOURS
Refills: 0 | Status: DISCONTINUED | OUTPATIENT
Start: 2021-01-09 | End: 2021-01-12

## 2021-01-09 RX ADMIN — Medication 25 MILLIGRAM(S): at 06:26

## 2021-01-09 RX ADMIN — BUDESONIDE AND FORMOTEROL FUMARATE DIHYDRATE 2 PUFF(S): 160; 4.5 AEROSOL RESPIRATORY (INHALATION) at 17:57

## 2021-01-09 RX ADMIN — BUDESONIDE AND FORMOTEROL FUMARATE DIHYDRATE 2 PUFF(S): 160; 4.5 AEROSOL RESPIRATORY (INHALATION) at 06:26

## 2021-01-09 RX ADMIN — Medication 1 TABLET(S): at 06:26

## 2021-01-09 RX ADMIN — BUMETANIDE 1 MILLIGRAM(S): 0.25 INJECTION INTRAMUSCULAR; INTRAVENOUS at 17:57

## 2021-01-09 RX ADMIN — BUMETANIDE 2 MILLIGRAM(S): 0.25 INJECTION INTRAMUSCULAR; INTRAVENOUS at 06:27

## 2021-01-09 RX ADMIN — Medication 1 APPLICATION(S): at 06:26

## 2021-01-09 RX ADMIN — Medication 100 MILLIGRAM(S): at 12:03

## 2021-01-09 RX ADMIN — Medication 2 SPRAY(S): at 06:26

## 2021-01-09 RX ADMIN — Medication 1 TABLET(S): at 17:57

## 2021-01-09 RX ADMIN — WARFARIN SODIUM 5 MILLIGRAM(S): 2.5 TABLET ORAL at 21:46

## 2021-01-09 RX ADMIN — HEPARIN SODIUM 1000 UNIT(S)/HR: 5000 INJECTION INTRAVENOUS; SUBCUTANEOUS at 09:34

## 2021-01-09 RX ADMIN — SIMVASTATIN 10 MILLIGRAM(S): 20 TABLET, FILM COATED ORAL at 21:46

## 2021-01-09 NOTE — PROGRESS NOTE ADULT - PROBLEM SELECTOR PLAN 7
- Presented to [facility] w/ MISAEL on CKD   - holding home ramipril 5mg daily for MISAEL on CKD   - Cr now at bl 1.3-1.5

## 2021-01-09 NOTE — PROGRESS NOTE ADULT - PROBLEM SELECTOR PLAN 4
- s/p ablation many years ago  - rate controlled on Toprol 25mg daily  - heparin bridge to coumadin, check INR - s/p ablation many years ago  - rate controlled on Toprol 25mg daily  - heparin bridge to coumadin, therapeutic INR on 1/9

## 2021-01-09 NOTE — PROGRESS NOTE ADULT - ASSESSMENT
70 years      h/o   CAD,  mechanical MVR and Tricuspid valve repair,        h/o  chronic   HFpEF,   HTN  . MIGUEL ,     severe MR  ,  s/p mechanical MVR 1999 , [facility] with Dr. Anderson,  and severe TR s/p TVR 2012 ([facility]),       COPD ,on home 02; no prior tobacco use      AFib on Coumadin , failed  ablation,   ,  PHTN,   CKD 3 (b/l SCr ~1.3-1.6),  MIGUEL and gout.     h/o   hypercarbia and she reports  needing Bipap,in the past,  but hasn't used in 10 years.      Morbid  obesity,  BMI is 41     presented with  CP,  dizziness and sob     She also had epistaxis at [facility],/         1.,  Atrial fibrillation       metoprolol ER 25 mg  / lipitor     2.  MVR  mechanical,1999  Continue IV heparin and warfarin with goal INR of 2.5-3.5 with mechanical MVR   daily INR   3.  TVR, 2012 at [facility]   4.  HTN, on Toprol   5. Dizziness , since resolved  Ct head , with infarcts, Right b. ganglia and Right cerebellum   6. .  Anemia,  has  c/c  anemia, , baseline is  8  to 9/   last colonoscopy  was  10 yrs  ago/ s/p melena last yr. and, no w/p was done.,as  pt was  deemed high risk  for  any procedure  7.  Acute  Diastolic  Chf    on  iv bumex bid       daily weights/ follow   I/O//   still  with sob on exertion  pt has lost  2 Kg  8.  Severe Pulm Htn, on echo  Echo  12/20,normal  ef/mod  MS/  severe Pulm Htn  per card dr mcnamara , pt   transferred to  [place]   for ELIZABETH/ ? fistula  from aorta  to RV/     s/p epistaxis  seen by ent  heart  failure team following pt   ELIZABETH,  cancelled  per  floor  by echo dept/  spoke  with  chf   Dr alba from 1/8.  MVR/  decreased  RV function/ no pfo/vsd  on coumadin/ follow inr       < from: Transthoracic Echocardiogram (01.08.21 @ 15:53) >  Conclusions:  1. Mechanical prosthetic mitral valve replacement. Peak  mitral valve gradient equals 15 mm Hg, mean transmitral  valve gradient equals 6 mm Hg, which is elevated even in  the setting of a mechanical prosthetic mitral valve  replacement (Heart rate 69 bpm).  2. Refer to prior echos for assesssment of LV function.  Septal motion consistent with cardiac surgery, right  ventricular overload.  3. Right atrial enlargement.  4. Right ventricular enlargement with decreased right  ventricular systolic function.  5. An annuloplasty ring is seen in the tricuspid position.  PG 3 mm hg, MG 1 mm hg. No tricuspid regurgitation.  6. Agitated saline injection demonstrates evidence of a  patent foramen ovale/VSD.  No color Doppler evidence of  < end of copied text >     < from: CT Head No Cont (12.29.20 @ 20:41)   IMPRESSION: No acute intracranial hemorrhage. Age-indeterminate right basal ganglia lacunar infarct and right cerebellar lacunar infarct. If there is clinical suspicion for acute infarct, consider brain MRI if there is no contraindication.  < end of copied text >    < from: TTE Echo Complete w/ Contrast w/ Doppler (12.31.20 @ 14:01) >  ummary:   1. Left ventricular ejection fraction, by visual estimation, is 60 to 65%.   2. Technically suboptimal study.   3. Normal global left ventricular systolic function.   4. Normal left ventricular internal cavity size.   5. Spectral Doppler shows pseudonormal pattern of left ventricular myocardial filling (Grade II diastolic dysfunction).   6. Normal right ventricular size and function.   7. Mild to moderately enlarged left atrium.   8. There is no evidence of pericardial effusion.   9. Mild mitral annular calcification.  10. Mild mitral valve regurgitation.  11. Mild thickening and calcification of the anterior and posterior mitral valve leaflets.  12. Peak transmitral mean gradient equals 6.8 mmHg, calculated mitral valve area by pressure half time equals 1.86 cm² consistent withmild mitral stenosis.  13. Mild tricuspid regurgitation.  14. Mild aortic regurgitation.  15. Sclerotic aortic valve with normal opening.  16. Estimated pulmonary artery systolic pressure is 68.4 mmHg assuming a right atrial pressure of 8 mmHg, whichis consistent with severe pulmonary hypertension.  17. C/w the study of 6-21-20, more significant pulmonary htn is now noted.  18. Endocardial visualization was enhanced with intravenous echo contrast.  19. Mitral valve mean gradient is 6.8 mmHg consistent with moderate mitral stenosis.  Montana Barreto MD FACC, FASE, FAC  < end of copied text >                	   70 years      h/o   CAD,  mechanical MVR and Tricuspid valve repair,        h/o  chronic   HFpEF,   HTN  . MIGUEL ,     severe MR  ,  s/p mechanical MVR 1999 , [facility] with Dr. Anderson,  and severe TR s/p TVR 2012 ([facility]),       COPD ,on home 02; no prior tobacco use      AFib on Coumadin , failed  ablation,   ,  PHTN,   CKD 3 (b/l SCr ~1.3-1.6),  IMGUEL and gout.     h/o   hypercarbia and she reports  needing Bipap,in the past,  but hasn't used in 10 years.      Morbid  obesity,  BMI is 41     presented with  CP,  dizziness and sob     She also had epistaxis at [facility],/         1.,  Atrial fibrillation       metoprolol ER 25 mg  / lipitor     2.  MVR  mechanical,1999  Continue IV heparin and warfarin with goal INR of 2.5-3.5 with mechanical MVR   daily INR   3.  TVR, 2012 at [facility]   4.  HTN, on Toprol   5. Dizziness , since resolved  Ct head , with infarcts, Right b. ganglia and Right cerebellum   6. .  Anemia,  has  c/c  anemia, , baseline is  8  to 9/   last colonoscopy  was  10 yrs  ago/ s/p melena last yr. and, no w/p was done.,as  pt was  deemed high risk  for  any procedure  7.  Acute  Diastolic  Chf    on  iv bumex bid       daily weights/ follow   I/O//   still  with sob on exertion  pt has lost  2 Kg  8.  Severe Pulm Htn, on echo  Echo  12/20,normal  ef/mod  MS/  severe Pulm Htn  per card dr mcnamara , pt   transferred to  [place]   for ELIZABETH/ ? fistula  from aorta  to RV/     s/p epistaxis  seen by ent  heart  failure team following pt / has lost 3  kg  ELIZABETH,  cancelled  per  floor  by echo dept/  spoke  with  chf   Dr alba from 1/8.  MVR/  decreased  RV function/ no pfo/vsd  on coumadin/ follow inr       < from: Transthoracic Echocardiogram (01.08.21 @ 15:53) >  Conclusions:  1. Mechanical prosthetic mitral valve replacement. Peak  mitral valve gradient equals 15 mm Hg, mean transmitral  valve gradient equals 6 mm Hg, which is elevated even in  the setting of a mechanical prosthetic mitral valve  replacement (Heart rate 69 bpm).  2. Refer to prior echos for assesssment of LV function.  Septal motion consistent with cardiac surgery, right  ventricular overload.  3. Right atrial enlargement.  4. Right ventricular enlargement with decreased right  ventricular systolic function.  5. An annuloplasty ring is seen in the tricuspid position.  PG 3 mm hg, MG 1 mm hg. No tricuspid regurgitation.  6. Agitated saline injection demonstrates evidence of a  patent foramen ovale/VSD.  No color Doppler evidence of  < end of copied text >     < from: CT Head No Cont (12.29.20 @ 20:41)   IMPRESSION: No acute intracranial hemorrhage. Age-indeterminate right basal ganglia lacunar infarct and right cerebellar lacunar infarct. If there is clinical suspicion for acute infarct, consider brain MRI if there is no contraindication.  < end of copied text >    < from: TTE Echo Complete w/ Contrast w/ Doppler (12.31.20 @ 14:01) >  ummary:   1. Left ventricular ejection fraction, by visual estimation, is 60 to 65%.   2. Technically suboptimal study.   3. Normal global left ventricular systolic function.   4. Normal left ventricular internal cavity size.   5. Spectral Doppler shows pseudonormal pattern of left ventricular myocardial filling (Grade II diastolic dysfunction).   6. Normal right ventricular size and function.   7. Mild to moderately enlarged left atrium.   8. There is no evidence of pericardial effusion.   9. Mild mitral annular calcification.  10. Mild mitral valve regurgitation.  11. Mild thickening and calcification of the anterior and posterior mitral valve leaflets.  12. Peak transmitral mean gradient equals 6.8 mmHg, calculated mitral valve area by pressure half time equals 1.86 cm² consistent withmild mitral stenosis.  13. Mild tricuspid regurgitation.  14. Mild aortic regurgitation.  15. Sclerotic aortic valve with normal opening.  16. Estimated pulmonary artery systolic pressure is 68.4 mmHg assuming a right atrial pressure of 8 mmHg, whichis consistent with severe pulmonary hypertension.  17. C/w the study of 6-21-20, more significant pulmonary htn is now noted.  18. Endocardial visualization was enhanced with intravenous echo contrast.  19. Mitral valve mean gradient is 6.8 mmHg consistent with moderate mitral stenosis.  Montana Barreto MD FACC, FASE, FAC  < end of copied text >

## 2021-01-09 NOTE — PROGRESS NOTE ADULT - PROBLEM SELECTOR PLAN 3
- s/p mechanical MVR 1999 w/ Dr. Anderson at [facility], no revisions   -min MR TTE 12/31  -plan as above. - s/p mechanical MVR 1999 w/ Dr. Anderson at [facility], no revisions   -min MR TTE 12/31

## 2021-01-09 NOTE — PROGRESS NOTE ADULT - ASSESSMENT
69yo female seen for right epistaxis, controlled with cauterization and rapid rhino nasal packing in right nare.

## 2021-01-09 NOTE — PROGRESS NOTE ADULT - SUBJECTIVE AND OBJECTIVE BOX
afebrile    REVIEW OF SYSTEMS:  GEN: no fever,    no chills  RESP: no SOB,   no cough  CVS: no chest pain,   no palpitations  GI: no abdominal pain,   no nausea,   no vomiting,   no constipation,   no diarrhea  : no dysuria,   no frequency  NEURO: no headache,   no dizziness  PSYCH: no depression,   not anxious  Derm : no rash    MEDICATIONS  (STANDING):  allopurinol 100 milliGRAM(s) Oral daily  amoxicillin  500 milliGRAM(s)/clavulanate 1 Tablet(s) Oral every 12 hours  BACItracin   Ointment 1 Application(s) Topical two times a day  budesonide 160 MICROgram(s)/formoterol 4.5 MICROgram(s) Inhaler 2 Puff(s) Inhalation two times a day  buMETAnide 1 milliGRAM(s) Oral every 12 hours  heparin  Infusion. 1400 Unit(s)/Hr (14 mL/Hr) IV Continuous <Continuous>  metoprolol succinate ER 25 milliGRAM(s) Oral daily  simvastatin 10 milliGRAM(s) Oral at bedtime  warfarin 5 milliGRAM(s) Oral once    MEDICATIONS  (PRN):      Vital Signs Last 24 Hrs  T(C): 36.9 (09 Jan 2021 04:25), Max: 37 (08 Jan 2021 11:52)  T(F): 98.5 (09 Jan 2021 04:25), Max: 98.6 (08 Jan 2021 11:52)  HR: 65 (09 Jan 2021 06:24) (63 - 78)  BP: 122/74 (09 Jan 2021 06:24) (122/74 - 151/79)  BP(mean): --  RR: 18 (09 Jan 2021 04:25) (18 - 18)  SpO2: 95% (09 Jan 2021 04:25) (95% - 100%)  CAPILLARY BLOOD GLUCOSE        I&O's Summary    08 Jan 2021 07:01  -  09 Jan 2021 07:00  --------------------------------------------------------  IN: 1080 mL / OUT: 1450 mL / NET: -370 mL    09 Jan 2021 07:01  -  09 Jan 2021 09:51  --------------------------------------------------------  IN: 0 mL / OUT: 300 mL / NET: -300 mL        PHYSICAL EXAM:  HEAD:  Atraumatic, Normocephalic  NECK: Supple, No   JVD  CHEST/LUNG:   no     rales,     no,    rhonchi  HEART: Regular rate and rhythm;         murmur  ABDOMEN: Soft, Nontender, ;   EXTREMITIES:   less     edema  NEUROLOGY:  alert    LABS:                        8.2    7.81  )-----------( 372      ( 09 Jan 2021 07:18 )             28.4     01-09    141  |  97  |  64<H>  ----------------------------<  101<H>  4.2   |  34<H>  |  1.54<H>    Ca    9.5      09 Jan 2021 07:18  Phos  3.8     01-08      PT/INR - ( 09 Jan 2021 07:18 )   PT: 30.6 sec;   INR: 2.67 ratio         PTT - ( 09 Jan 2021 07:18 )  PTT:84.9 sec                Thyroid Stimulating Hormone, Serum: 1.630 uIU/mL (01-02 @ 06:57)          Consultant(s) Notes Reviewed:      Care Discussed with Consultants/Other Providers:

## 2021-01-09 NOTE — PROGRESS NOTE ADULT - ASSESSMENT
71 yo female with a PMHx of CAD, HFpEF (EF 65-60%), severe MR s/p mechanical MVR 1999 (Dr. Anderson, [facility]), severe TR s/p mechanical TVR (2012 [facility]), afib s/p ablation, HTN, HLD, CKD (bl 1.35-1.5), COPD on 3L NC at home, MIGUEL who presented to the [facility] for cp, sob, dizziness x 1 week, found to be in decompensated HFpEF, w/ tachy-ofelia rhythm. Seen by Dr. Thrasher Cardiology at [facility]. s/p IV diuresis w/ lasix BID. TTE 12/31 reviewed by [facility] Dr. Escobar, c/f possible L-R shunt, ?fistula between aortic valve and RV. Patient transferred to [facility] for ELIZABETH and escalation of care. Remains hypervolemic. Continued on IV diuresis.     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

## 2021-01-09 NOTE — PROGRESS NOTE ADULT - SUBJECTIVE AND OBJECTIVE BOX
ENT ISSUE/POD: epistaxis    HPI: 71yo female seen for epistaxis. Pt was previously seen by ENT for epistaxis, nasal packing was removed yesterday. Pt states brisk bleeding from right nare started today. Pt is on coumadin and heparin, INR 2.67.         PAST MEDICAL & SURGICAL HISTORY:  COPD (chronic obstructive pulmonary disease)    Hypertension    CHF (congestive heart failure)    Tricuspid valve replaced    Mitral valve replaced    Gout    HTN (hypertension)    CHF (congestive heart failure)    COPD (chronic obstructive pulmonary disease)  on home O2    MIGUEL (obstructive sleep apnea)    Pulmonary hypertension    Atrial fibrillation  S/P Ablation    No significant past surgical history    Tricuspid valve replaced  mechanical    History of mitral valve replacement with mechanical valve      Allergies    No Known Allergies    Intolerances      MEDICATIONS  (STANDING):  allopurinol 100 milliGRAM(s) Oral daily  amoxicillin  500 milliGRAM(s)/clavulanate 1 Tablet(s) Oral every 12 hours  BACItracin   Ointment 1 Application(s) Topical two times a day  budesonide 160 MICROgram(s)/formoterol 4.5 MICROgram(s) Inhaler 2 Puff(s) Inhalation two times a day  buMETAnide 1 milliGRAM(s) Oral every 12 hours  metoprolol succinate ER 25 milliGRAM(s) Oral daily  simvastatin 10 milliGRAM(s) Oral at bedtime  warfarin 5 milliGRAM(s) Oral once    MEDICATIONS  (PRN):      Social History: see consult note    Family history: see consult note    ROS:   ENT: all negative except as noted in HPI   Pulm: denies SOB, cough, hemoptysis  Neuro: denies numbness/tingling, loss of sensation  Endo: denies heat/cold intolerance, excessive sweating      Vital Signs Last 24 Hrs  T(C): 36.3 (09 Jan 2021 11:18), Max: 36.9 (08 Jan 2021 20:39)  T(F): 97.4 (09 Jan 2021 11:18), Max: 98.5 (08 Jan 2021 20:39)  HR: 75 (09 Jan 2021 11:18) (63 - 75)  BP: 129/78 (09 Jan 2021 11:18) (122/74 - 151/79)  BP(mean): --  RR: 18 (09 Jan 2021 11:18) (18 - 18)  SpO2: 97% (09 Jan 2021 11:18) (95% - 97%)                          8.2    7.81  )-----------( 372      ( 09 Jan 2021 07:18 )             28.4    01-09    141  |  97  |  64<H>  ----------------------------<  101<H>  4.2   |  34<H>  |  1.54<H>    Ca    9.5      09 Jan 2021 07:18  Phos  3.8     01-08     PT/INR - ( 09 Jan 2021 07:18 )   PT: 30.6 sec;   INR: 2.67 ratio         PTT - ( 09 Jan 2021 07:18 )  PTT:84.9 sec    PHYSICAL EXAM:  Gen: NAD  Skin: No rashes, bruises, or lesions  Head: Normocephalic, Atraumatic  Face: no edema, erythema, or fluctuance. Parotid glands soft without mass  Eyes: no scleral injection  Nose: Brisk bleeding from right nare, controlled with silver nitrate cauterization and packed with rapid rhino 7.5, inflated with 8cc air. No further bleeding noted.   Mouth: No Stridor / Drooling / Trismus.  Mucosa moist, tongue/uvula midline, oropharynx clear, no bleeding in posterior pharynx after packing placed.   Neck: Flat, supple, no lymphadenopathy, trachea midline, no masses  Lymphatic: No lymphadenopathy  Resp: breathing easily, no stridor  Neuro: facial nerve intact, no facial droop

## 2021-01-09 NOTE — PROGRESS NOTE ADULT - SUBJECTIVE AND OBJECTIVE BOX
Cardiology Progress Note    Interval:     Tele:    Medications:  allopurinol 100 milliGRAM(s) Oral daily  amoxicillin  500 milliGRAM(s)/clavulanate 1 Tablet(s) Oral every 12 hours  BACItracin   Ointment 1 Application(s) Topical two times a day  budesonide 160 MICROgram(s)/formoterol 4.5 MICROgram(s) Inhaler 2 Puff(s) Inhalation two times a day  buMETAnide Injectable 2 milliGRAM(s) IV Push every 12 hours  heparin   Injectable 9000 Unit(s) IV Push every 6 hours PRN  heparin   Injectable 4000 Unit(s) IV Push every 6 hours PRN  heparin  Infusion. 1400 Unit(s)/Hr IV Continuous <Continuous>  metoprolol succinate ER 25 milliGRAM(s) Oral daily  simvastatin 10 milliGRAM(s) Oral at bedtime  sodium chloride 0.65% Nasal 2 Spray(s) Both Nostrils four times a day      Review of Systems:  Constitutional: [ ] Fever [ ] Chills [ ] Fatigue [ ] Weight change   HEENT: [ ] Blurred vision [ ] Eye Pain [ ] Headache [ ] Runny nose [ ] Sore Throat   Respiratory: [ ] Cough [ ] Wheezing [ ] Shortness of breath  Cardiovascular: [ ] Chest Pain [ ] Palpitations [ ] VILLA [ ] PND [ ] Orthopnea  Gastrointestinal: [ ] Abdominal Pain [ ] Diarrhea [ ] Constipation [ ] Hemorrhoids [ ] Nausea [ ] Vomiting  Genitourinary: [ ] Nocturia [ ] Dysuria [ ] Incontinence  Extremities: [ ] Swelling [ ] Joint Pain  Neurologic: [ ] Focal deficit [ ] Paresthesias [ ] Syncope  Lymphatic: [ ] Swelling [ ] Lymphadenopathy   Skin: [ ] Rash [ ] Ecchymoses [ ] Wounds [ ] Lesions  Psychiatry: [ ] Depression [ ] Suicidal/Homicidal Ideation [ ] Anxiety [ ] Sleep Disturbances  [ ] 10 point review of systems is otherwise negative except as mentioned above            [ ]Unable to obtain    Vitals:  T(C): 36.9 (21 @ 04:25), Max: 37 (21 @ 11:52)  HR: 65 (21 @ 06:24) (63 - 78)  BP: 122/74 (21 @ 06:24) (122/74 - 151/79)  BP(mean): --  RR: 18 (21 @ 04:25) (18 - 18)  SpO2: 95% (21 @ 04:25) (95% - 100%)  Wt(kg): --  Daily     Daily Weight in k.1 (2021 04:25)  I&O's Summary    2021 07:01  -  2021 07:00  --------------------------------------------------------  IN: 960 mL / OUT: 1450 mL / NET: -490 mL        Physical Exam:  General: elderly black female in NAD  HEENT: NCAT. No scleral icterus or injection.  Moist MM.    Heart: RRR.  Irregularly irregular rhythm.  Murmur of mechanical valve. No rubs, gallops. JVD 12cm.   Lungs: dry bb crackles, no wheezing, rhonchi   Abdomen: BS+, soft, NT/ND.  No organomegaly.  Skin: Warm and dry   Extremities: trace edema bl up to level of knees, improving from prior   Neuro: A&Ox3     Labs:                        8.2    7.81  )-----------( 372      ( 2021 07:18 )             28.4     -    139  |  96  |  66<H>  ----------------------------<  107<H>  4.4   |  35<H>  |  1.68<H>    Ca    9.9      2021 05:57  Phos  3.8           PT/INR - ( 2021 05:57 )   PT: 28.7 sec;   INR: 2.50 ratio         PTT - ( 2021 05:57 )  PTT:62.1 sec              New results/imaging:   Cardiology Progress Note    Interval: Pt resting comfortably in bed. Noted feeling well with no chest pain, palpitations, and SOB.    Tele: rate controlled AF    Medications:  allopurinol 100 milliGRAM(s) Oral daily  amoxicillin  500 milliGRAM(s)/clavulanate 1 Tablet(s) Oral every 12 hours  BACItracin   Ointment 1 Application(s) Topical two times a day  budesonide 160 MICROgram(s)/formoterol 4.5 MICROgram(s) Inhaler 2 Puff(s) Inhalation two times a day  buMETAnide Injectable 2 milliGRAM(s) IV Push every 12 hours  heparin   Injectable 9000 Unit(s) IV Push every 6 hours PRN  heparin   Injectable 4000 Unit(s) IV Push every 6 hours PRN  heparin  Infusion. 1400 Unit(s)/Hr IV Continuous <Continuous>  metoprolol succinate ER 25 milliGRAM(s) Oral daily  simvastatin 10 milliGRAM(s) Oral at bedtime  sodium chloride 0.65% Nasal 2 Spray(s) Both Nostrils four times a day      Review of Systems:  Constitutional: [ ] Fever [ ] Chills [ ] Fatigue [ ] Weight change   HEENT: [ ] Blurred vision [ ] Eye Pain [ ] Headache [ ] Runny nose [ ] Sore Throat   Respiratory: [ ] Cough [ ] Wheezing [ ] Shortness of breath  Cardiovascular: [ ] Chest Pain [ ] Palpitations [ ] VILLA [ ] PND [ ] Orthopnea  Gastrointestinal: [ ] Abdominal Pain [ ] Diarrhea [ ] Constipation [ ] Hemorrhoids [ ] Nausea [ ] Vomiting  Genitourinary: [ ] Nocturia [ ] Dysuria [ ] Incontinence  Extremities: [ ] Swelling [ ] Joint Pain  Neurologic: [ ] Focal deficit [ ] Paresthesias [ ] Syncope  Lymphatic: [ ] Swelling [ ] Lymphadenopathy   Skin: [ ] Rash [ ] Ecchymoses [ ] Wounds [ ] Lesions  Psychiatry: [ ] Depression [ ] Suicidal/Homicidal Ideation [ ] Anxiety [ ] Sleep Disturbances  [ ] 10 point review of systems is otherwise negative except as mentioned above            [ ]Unable to obtain    Vitals:  T(C): 36.9 (21 @ 04:25), Max: 37 (21 @ 11:52)  HR: 65 (21 @ 06:24) (63 - 78)  BP: 122/74 (21 @ 06:24) (122/74 - 151/79)  BP(mean): --  RR: 18 (21 @ 04:25) (18 - 18)  SpO2: 95% (21 @ 04:25) (95% - 100%)  Wt(kg): --  Daily     Daily Weight in k.1 (2021 04:25)  I&O's Summary    2021 07:01  -  2021 07:00  --------------------------------------------------------  IN: 960 mL / OUT: 1450 mL / NET: -490 mL        Physical Exam:  General: elderly black female in NAD  HEENT: NCAT. No scleral icterus or injection.  Moist MM.    Heart: Irregularly irregular rhythm.  Murmur of mechanical valve. No rubs, gallops. JVD 12cm.   Lungs: dry bb crackles, no wheezing, rhonchi   Abdomen: BS+, soft, NT/ND.  No organomegaly.  Skin: Warm and dry   Extremities: trace edema bl up to level of knees  Neuro: A&Ox3     Labs:                        8.2    7.81  )-----------( 372      ( 2021 07:18 )             28.4     01-08    139  |  96  |  66<H>  ----------------------------<  107<H>  4.4   |  35<H>  |  1.68<H>    Ca    9.9      2021 05:57  Phos  3.8     01-08      PT/INR - ( 2021 05:57 )   PT: 28.7 sec;   INR: 2.50 ratio         PTT - ( 2021 05:57 )  PTT:62.1 sec              New results/imaging:

## 2021-01-09 NOTE — PROGRESS NOTE ADULT - PROBLEM SELECTOR PLAN 2
- s/p mechanical TVR 2012 at [facility]  - Severe functional TR on TTE 12/31, no TR noted on ELIZABETH on 1/8  - On hep bridge to coumadin, check INR - s/p mechanical TVR 2012 at [facility]  - Severe functional TR on TTE 12/31, no TR noted on ELIZABETH on 1/8  - On hep bridge to coumadin, therapeutic INR on 1/9

## 2021-01-09 NOTE — PROGRESS NOTE ADULT - PROBLEM SELECTOR PLAN 1
- Switch to oral regimen today, start PO Bumex 1mg BID  - Fluid restrict 1.5 L, daily standing weights   - Strict Is and Os, consider external female catheter for more accurate output recording  - TTE (1/8) showing PFO or VSD with agitated saline, no color doppler signals suggestive of VSD  - pending ELIZABETH - Switch to oral regimen today, start PO Bumex 2mg BID  - Fluid restrict 1.5 L, daily standing weights   - Strict Is and Os, consider external female catheter for more accurate output recording  - TTE (1/8) showing PFO or VSD with agitated saline, no color doppler signals suggestive of VSD  - no need for ELIZABETH, pt may be discharged from HF standpoint on PO bumex 2mg BID and appropriately coumadin dosing

## 2021-01-09 NOTE — PROGRESS NOTE ADULT - PROBLEM SELECTOR PLAN 1
- plan for packing removal 1/12  - gram-positive abx coverage for duration of packing placement  - Strict blood pressure control.  - Nasal saline, 2 sprays to both nares 4 times a day  - Avoid nasal trauma; no nose rubbing, blowing or manipulating nasal packing.  Sneeze with mouth open and pinching nares.  - Avoid bending with head blow the waist.    -No heavy lifting.

## 2021-01-10 LAB
ANION GAP SERPL CALC-SCNC: 8 MMOL/L — SIGNIFICANT CHANGE UP (ref 5–17)
BUN SERPL-MCNC: 54 MG/DL — HIGH (ref 7–23)
CALCIUM SERPL-MCNC: 10 MG/DL — SIGNIFICANT CHANGE UP (ref 8.4–10.5)
CHLORIDE SERPL-SCNC: 97 MMOL/L — SIGNIFICANT CHANGE UP (ref 96–108)
CO2 SERPL-SCNC: 35 MMOL/L — HIGH (ref 22–31)
CREAT SERPL-MCNC: 1.52 MG/DL — HIGH (ref 0.5–1.3)
GLUCOSE SERPL-MCNC: 107 MG/DL — HIGH (ref 70–99)
HCT VFR BLD CALC: 29.8 % — LOW (ref 34.5–45)
HGB BLD-MCNC: 8.2 G/DL — LOW (ref 11.5–15.5)
INR BLD: 3.07 RATIO — HIGH (ref 0.88–1.16)
MAGNESIUM SERPL-MCNC: 2.2 MG/DL — SIGNIFICANT CHANGE UP (ref 1.6–2.6)
MCHC RBC-ENTMCNC: 25.2 PG — LOW (ref 27–34)
MCHC RBC-ENTMCNC: 27.5 GM/DL — LOW (ref 32–36)
MCV RBC AUTO: 91.4 FL — SIGNIFICANT CHANGE UP (ref 80–100)
NRBC # BLD: 0 /100 WBCS — SIGNIFICANT CHANGE UP (ref 0–0)
PLATELET # BLD AUTO: 409 K/UL — HIGH (ref 150–400)
POTASSIUM SERPL-MCNC: 4.8 MMOL/L — SIGNIFICANT CHANGE UP (ref 3.5–5.3)
POTASSIUM SERPL-SCNC: 4.8 MMOL/L — SIGNIFICANT CHANGE UP (ref 3.5–5.3)
PROTHROM AB SERPL-ACNC: 34.9 SEC — HIGH (ref 10.6–13.6)
RBC # BLD: 3.26 M/UL — LOW (ref 3.8–5.2)
RBC # FLD: 16.5 % — HIGH (ref 10.3–14.5)
SODIUM SERPL-SCNC: 140 MMOL/L — SIGNIFICANT CHANGE UP (ref 135–145)
WBC # BLD: 9.67 K/UL — SIGNIFICANT CHANGE UP (ref 3.8–10.5)
WBC # FLD AUTO: 9.67 K/UL — SIGNIFICANT CHANGE UP (ref 3.8–10.5)

## 2021-01-10 PROCEDURE — 93010 ELECTROCARDIOGRAM REPORT: CPT

## 2021-01-10 RX ORDER — WARFARIN SODIUM 2.5 MG/1
3 TABLET ORAL ONCE
Refills: 0 | Status: COMPLETED | OUTPATIENT
Start: 2021-01-10 | End: 2021-01-10

## 2021-01-10 RX ADMIN — SIMVASTATIN 10 MILLIGRAM(S): 20 TABLET, FILM COATED ORAL at 21:57

## 2021-01-10 RX ADMIN — BUDESONIDE AND FORMOTEROL FUMARATE DIHYDRATE 2 PUFF(S): 160; 4.5 AEROSOL RESPIRATORY (INHALATION) at 06:00

## 2021-01-10 RX ADMIN — Medication 1 APPLICATION(S): at 17:03

## 2021-01-10 RX ADMIN — Medication 1 APPLICATION(S): at 06:31

## 2021-01-10 RX ADMIN — BUMETANIDE 1 MILLIGRAM(S): 0.25 INJECTION INTRAMUSCULAR; INTRAVENOUS at 17:03

## 2021-01-10 RX ADMIN — WARFARIN SODIUM 3 MILLIGRAM(S): 2.5 TABLET ORAL at 21:57

## 2021-01-10 RX ADMIN — Medication 25 MILLIGRAM(S): at 06:31

## 2021-01-10 RX ADMIN — BUMETANIDE 1 MILLIGRAM(S): 0.25 INJECTION INTRAMUSCULAR; INTRAVENOUS at 06:31

## 2021-01-10 RX ADMIN — Medication 1 TABLET(S): at 06:31

## 2021-01-10 RX ADMIN — Medication 1 TABLET(S): at 17:03

## 2021-01-10 RX ADMIN — Medication 100 MILLIGRAM(S): at 09:30

## 2021-01-10 RX ADMIN — BUDESONIDE AND FORMOTEROL FUMARATE DIHYDRATE 2 PUFF(S): 160; 4.5 AEROSOL RESPIRATORY (INHALATION) at 17:03

## 2021-01-10 NOTE — PROGRESS NOTE ADULT - ASSESSMENT
70 years      h/o   CAD,  mechanical MVR and Tricuspid valve repair,        h/o  chronic   HFpEF,   HTN  . MIGUEL ,     severe MR  ,  s/p mechanical MVR 1999 , [facility] with Dr. Anderson,  and severe TR s/p TVR 2012 ([facility]),       COPD ,on home 02; no prior tobacco use      AFib on Coumadin , failed  ablation,   ,  PHTN,   CKD 3 (b/l SCr ~1.3-1.6),  MIGUEL and gout.     h/o   hypercarbia and she reports  needing Bipap,in the past,  but hasn't used in 10 years.      Morbid  obesity,  BMI is 41     presented with  CP,  dizziness and sob     She also had epistaxis at [facility],/         1.,  Atrial fibrillation       metoprolol ER 25 mg  / lipitor     2.  MVR  mechanical,1999  Continue IV heparin and warfarin with goal INR of 2.5-3.5 with mechanical MVR   daily INR   3.  TVR, 2012 at [facility]   4.  HTN, on Toprol   5. Dizziness , since resolved  Ct head , with infarcts, Right b. ganglia and Right cerebellum   6. .  Anemia,  has  c/c  anemia, , baseline is  8  to 9/   last colonoscopy  was  10 yrs  ago/ s/p melena last yr. and, no w/p was done.,as  pt was  deemed high risk  for  any procedure  7.  Acute  Diastolic  Chf    on  iv bumex bid       daily weights/ follow   I/O//   still  with sob on exertion  8.  Severe Pulm Htn, on echo  Echo  12/20,normal  ef/mod  MS/  severe Pulm Htn  per card dr mcnamara , pt   transferred to  [place]   for ELIZABETH/ ? fistula  from aorta  to RV/     s/p epistaxis  seen by ent  heart  failure team following pt / has lost 3  kg  ELIZABETH,  cancelled  per  floor  by echo dept/  spoke  with  chf   Dr alba from 1/8.  MVR/  decreased  RV function/ no pfo/vsd  on coumadin/ follow inr  pe  ENT, packing removal on 1/12  Mechanical MVR./  on coumadin       < from: Transthoracic Echocardiogram (01.08.21 @ 15:53) >  Conclusions:  1. Mechanical prosthetic mitral valve replacement. Peak  mitral valve gradient equals 15 mm Hg, mean transmitral  valve gradient equals 6 mm Hg, which is elevated even in  the setting of a mechanical prosthetic mitral valve  replacement (Heart rate 69 bpm).  2. Refer to prior echos for assesssment of LV function.  Septal motion consistent with cardiac surgery, right  ventricular overload.  3. Right atrial enlargement.  4. Right ventricular enlargement with decreased right  ventricular systolic function.  5. An annuloplasty ring is seen in the tricuspid position.  PG 3 mm hg, MG 1 mm hg. No tricuspid regurgitation.  6. Agitated saline injection demonstrates evidence of a  patent foramen ovale/VSD.  No color Doppler evidence of  < end of copied text >     < from: CT Head No Cont (12.29.20 @ 20:41)   IMPRESSION: No acute intracranial hemorrhage. Age-indeterminate right basal ganglia lacunar infarct and right cerebellar lacunar infarct. If there is clinical suspicion for acute infarct, consider brain MRI if there is no contraindication.  < end of copied text >    < from: TTE Echo Complete w/ Contrast w/ Doppler (12.31.20 @ 14:01) >  ummary:   1. Left ventricular ejection fraction, by visual estimation, is 60 to 65%.   2. Technically suboptimal study.   3. Normal global left ventricular systolic function.   4. Normal left ventricular internal cavity size.   5. Spectral Doppler shows pseudonormal pattern of left ventricular myocardial filling (Grade II diastolic dysfunction).   6. Normal right ventricular size and function.   7. Mild to moderately enlarged left atrium.   8. There is no evidence of pericardial effusion.   9. Mild mitral annular calcification.  10. Mild mitral valve regurgitation.  11. Mild thickening and calcification of the anterior and posterior mitral valve leaflets.  12. Peak transmitral mean gradient equals 6.8 mmHg, calculated mitral valve area by pressure half time equals 1.86 cm² consistent withmild mitral stenosis.  13. Mild tricuspid regurgitation.  14. Mild aortic regurgitation.  15. Sclerotic aortic valve with normal opening.  16. Estimated pulmonary artery systolic pressure is 68.4 mmHg assuming a right atrial pressure of 8 mmHg, whichis consistent with severe pulmonary hypertension.  17. C/w the study of 6-21-20, more significant pulmonary htn is now noted.  18. Endocardial visualization was enhanced with intravenous echo contrast.  19. Mitral valve mean gradient is 6.8 mmHg consistent with moderate mitral stenosis.  Montana Barreto MD FACC, FASE, FAC  < end of copied text >

## 2021-01-10 NOTE — PROGRESS NOTE ADULT - SUBJECTIVE AND OBJECTIVE BOX
ENT ISSUE/POD: epistaxis    HPI: 69yo female seen for epistaxis. Pt was previously seen by ENT for epistaxis, nasal packing was removed yesterday. Pt states brisk bleeding from right nare started today. Pt is on coumadin and heparin, INR 2.67.     PAST MEDICAL & SURGICAL HISTORY:  COPD (chronic obstructive pulmonary disease)    Hypertension    CHF (congestive heart failure)    Tricuspid valve replaced    Mitral valve replaced    Gout    HTN (hypertension)    CHF (congestive heart failure)    COPD (chronic obstructive pulmonary disease)  on home O2    MIGUEL (obstructive sleep apnea)    Pulmonary hypertension    Atrial fibrillation  S/P Ablation    No significant past surgical history    Tricuspid valve replaced  mechanical    History of mitral valve replacement with mechanical valve      Allergies    No Known Allergies    Intolerances      MEDICATIONS  (STANDING):  allopurinol 100 milliGRAM(s) Oral daily  amoxicillin  500 milliGRAM(s)/clavulanate 1 Tablet(s) Oral every 12 hours  BACItracin   Ointment 1 Application(s) Topical two times a day  budesonide 160 MICROgram(s)/formoterol 4.5 MICROgram(s) Inhaler 2 Puff(s) Inhalation two times a day  buMETAnide 1 milliGRAM(s) Oral every 12 hours  metoprolol succinate ER 25 milliGRAM(s) Oral daily  simvastatin 10 milliGRAM(s) Oral at bedtime    MEDICATIONS  (PRN):      social history: see consult     family history: see consult     ROS:   ENT: all negative except as noted in HPI   Pulm: denies SOB, cough, hemoptysis  Neuro: denies numbness/tingling, loss of sensation  Endo: denies heat/cold intolerance, excessive sweating      Vital Signs Last 24 Hrs  T(C): 36.8 (10 Ruddy 2021 04:27), Max: 36.8 (10 Ruddy 2021 04:27)  T(F): 98.3 (10 Ruddy 2021 04:27), Max: 98.3 (10 Ruddy 2021 04:27)  HR: 73 (10 Ruddy 2021 04:27) (73 - 75)  BP: 127/64 (10 Ruddy 2021 04:27) (127/64 - 153/68)  BP(mean): --  RR: 18 (10 Ruddy 2021 04:27) (18 - 18)  SpO2: 95% (10 Ruddy 2021 04:27) (95% - 97%)                          8.2    9.67  )-----------( 409      ( 10 Ruddy 2021 06:35 )             29.8    01-10    140  |  97  |  54<H>  ----------------------------<  107<H>  4.8   |  35<H>  |  1.52<H>    Ca    10.0      10 Ruddy 2021 06:35  Mg     2.2     01-10     PT/INR - ( 10 Ruddy 2021 06:35 )   PT: 34.9 sec;   INR: 3.07 ratio         PTT - ( 09 Jan 2021 07:18 )  PTT:84.9 sec    PHYSICAL EXAM:  Gen: NAD  Skin: No rashes, bruises, or lesions  Head: Normocephalic, Atraumatic  Face: no edema, erythema, or fluctuance. Parotid glands soft without mass  Eyes: no scleral injection  Nose: Brisk bleeding from right nare, controlled with silver nitrate cauterization and packed with rapid rhino 7.5, inflated with 8cc air. No further bleeding noted.   Mouth: No Stridor / Drooling / Trismus.  Mucosa moist, tongue/uvula midline, oropharynx clear, no bleeding in posterior pharynx after packing placed.   Neck: Flat, supple, no lymphadenopathy, trachea midline, no masses  Lymphatic: No lymphadenopathy  Resp: breathing easily, no stridor  Neuro: facial nerve intact, no facial droop

## 2021-01-10 NOTE — CHART NOTE - NSCHARTNOTEFT_GEN_A_CORE
DAMARI GUERRERO    Notified by RN patient with frequent PVC's and PSVT, but no c/o cp or sob. VSS.      Interventions taken     F/u am labs to keep K >4 and Mg >2  C/W BB  cardio consult  cont assess and monitor  f/u with am team.                    Zeferino Gauthier ANP-BC  Spectralink #[id number]

## 2021-01-10 NOTE — PROGRESS NOTE ADULT - SUBJECTIVE AND OBJECTIVE BOX
afebrile/  s/p epistaxisless  REVIEW OF SYSTEMS:  GEN: no fever,    no chills  RESP: no SOB,   no cough  CVS: no chest pain,   no palpitations  GI: no abdominal pain,   no nausea,   no vomiting,   no constipation,   no diarrhea  : no dysuria,   no frequency  NEURO: no headache,   no dizziness  PSYCH: no depression,   not anxious  Derm : no rash    MEDICATIONS  (STANDING):  allopurinol 100 milliGRAM(s) Oral daily  amoxicillin  500 milliGRAM(s)/clavulanate 1 Tablet(s) Oral every 12 hours  BACItracin   Ointment 1 Application(s) Topical two times a day  budesonide 160 MICROgram(s)/formoterol 4.5 MICROgram(s) Inhaler 2 Puff(s) Inhalation two times a day  buMETAnide 1 milliGRAM(s) Oral every 12 hours  metoprolol succinate ER 25 milliGRAM(s) Oral daily  simvastatin 10 milliGRAM(s) Oral at bedtime    MEDICATIONS  (PRN):      Vital Signs Last 24 Hrs  T(C): 36.8 (10 Ruddy 2021 04:27), Max: 36.8 (10 Ruddy 2021 04:27)  T(F): 98.3 (10 Ruddy 2021 04:27), Max: 98.3 (10 Ruddy 2021 04:27)  HR: 73 (10 Ruddy 2021 04:27) (73 - 75)  BP: 127/64 (10 Ruddy 2021 04:27) (127/64 - 153/68)  BP(mean): --  RR: 18 (10 Ruddy 2021 04:27) (18 - 18)  SpO2: 95% (10 Ruddy 2021 04:27) (95% - 97%)  CAPILLARY BLOOD GLUCOSE        I&O's Summary    09 Jan 2021 07:01  -  10 Ruddy 2021 07:00  --------------------------------------------------------  IN: 1080 mL / OUT: 1300 mL / NET: -220 mL    10 Ruddy 2021 07:01  -  10 Ruddy 2021 10:13  --------------------------------------------------------  IN: 240 mL / OUT: 250 mL / NET: -10 mL        PHYSICAL EXAM:  HEAD:  Atraumatic, Normocephalic  NECK: Supple, No   JVD  CHEST/LUNG:   no     rales,     no,    rhonchi  HEART: Regular rate and rhythm;         murmur  ABDOMEN: Soft, Nontender, ;   EXTREMITIES:    less    edema  NEUROLOGY:  alert    LABS:                        8.2    9.67  )-----------( 409      ( 10 Ruddy 2021 06:35 )             29.8     01-10    140  |  97  |  54<H>  ----------------------------<  107<H>  4.8   |  35<H>  |  1.52<H>    Ca    10.0      10 Ruddy 2021 06:35  Mg     2.2     01-10      PT/INR - ( 10 Ruddy 2021 06:35 )   PT: 34.9 sec;   INR: 3.07 ratio         PTT - ( 09 Jan 2021 07:18 )  PTT:84.9 sec                Thyroid Stimulating Hormone, Serum: 1.630 uIU/mL (01-02 @ 06:57)          Consultant(s) Notes Reviewed:      Care Discussed with Consultants/Other Providers:

## 2021-01-11 LAB
ANION GAP SERPL CALC-SCNC: 10 MMOL/L — SIGNIFICANT CHANGE UP (ref 5–17)
BUN SERPL-MCNC: 50 MG/DL — HIGH (ref 7–23)
CALCIUM SERPL-MCNC: 10.2 MG/DL — SIGNIFICANT CHANGE UP (ref 8.4–10.5)
CHLORIDE SERPL-SCNC: 94 MMOL/L — LOW (ref 96–108)
CO2 SERPL-SCNC: 33 MMOL/L — HIGH (ref 22–31)
CREAT SERPL-MCNC: 1.48 MG/DL — HIGH (ref 0.5–1.3)
GLUCOSE SERPL-MCNC: 90 MG/DL — SIGNIFICANT CHANGE UP (ref 70–99)
INR BLD: 2.73 RATIO — HIGH (ref 0.88–1.16)
POTASSIUM SERPL-MCNC: 4.4 MMOL/L — SIGNIFICANT CHANGE UP (ref 3.5–5.3)
POTASSIUM SERPL-SCNC: 4.4 MMOL/L — SIGNIFICANT CHANGE UP (ref 3.5–5.3)
PROTHROM AB SERPL-ACNC: 31.2 SEC — HIGH (ref 10.6–13.6)
SODIUM SERPL-SCNC: 137 MMOL/L — SIGNIFICANT CHANGE UP (ref 135–145)

## 2021-01-11 PROCEDURE — 93010 ELECTROCARDIOGRAM REPORT: CPT

## 2021-01-11 PROCEDURE — 99233 SBSQ HOSP IP/OBS HIGH 50: CPT

## 2021-01-11 PROCEDURE — 99232 SBSQ HOSP IP/OBS MODERATE 35: CPT

## 2021-01-11 PROCEDURE — 71045 X-RAY EXAM CHEST 1 VIEW: CPT | Mod: 26

## 2021-01-11 RX ORDER — BUMETANIDE 0.25 MG/ML
0.5 INJECTION INTRAMUSCULAR; INTRAVENOUS ONCE
Refills: 0 | Status: COMPLETED | OUTPATIENT
Start: 2021-01-11 | End: 2021-01-11

## 2021-01-11 RX ORDER — WARFARIN SODIUM 2.5 MG/1
5 TABLET ORAL ONCE
Refills: 0 | Status: COMPLETED | OUTPATIENT
Start: 2021-01-11 | End: 2021-01-11

## 2021-01-11 RX ORDER — SODIUM CHLORIDE 0.65 %
1 AEROSOL, SPRAY (ML) NASAL
Refills: 0 | Status: DISCONTINUED | OUTPATIENT
Start: 2021-01-11 | End: 2021-01-18

## 2021-01-11 RX ADMIN — Medication 25 MILLIGRAM(S): at 05:10

## 2021-01-11 RX ADMIN — Medication 1 APPLICATION(S): at 05:10

## 2021-01-11 RX ADMIN — WARFARIN SODIUM 5 MILLIGRAM(S): 2.5 TABLET ORAL at 21:16

## 2021-01-11 RX ADMIN — Medication 1 SPRAY(S): at 17:25

## 2021-01-11 RX ADMIN — BUMETANIDE 0.5 MILLIGRAM(S): 0.25 INJECTION INTRAMUSCULAR; INTRAVENOUS at 22:14

## 2021-01-11 RX ADMIN — Medication 100 MILLIGRAM(S): at 09:24

## 2021-01-11 RX ADMIN — BUMETANIDE 1 MILLIGRAM(S): 0.25 INJECTION INTRAMUSCULAR; INTRAVENOUS at 05:10

## 2021-01-11 RX ADMIN — Medication 1 TABLET(S): at 16:03

## 2021-01-11 RX ADMIN — BUDESONIDE AND FORMOTEROL FUMARATE DIHYDRATE 2 PUFF(S): 160; 4.5 AEROSOL RESPIRATORY (INHALATION) at 16:03

## 2021-01-11 RX ADMIN — BUDESONIDE AND FORMOTEROL FUMARATE DIHYDRATE 2 PUFF(S): 160; 4.5 AEROSOL RESPIRATORY (INHALATION) at 05:10

## 2021-01-11 RX ADMIN — Medication 1 TABLET(S): at 05:10

## 2021-01-11 RX ADMIN — BUMETANIDE 1 MILLIGRAM(S): 0.25 INJECTION INTRAMUSCULAR; INTRAVENOUS at 16:03

## 2021-01-11 RX ADMIN — Medication 1 APPLICATION(S): at 16:03

## 2021-01-11 RX ADMIN — SIMVASTATIN 10 MILLIGRAM(S): 20 TABLET, FILM COATED ORAL at 21:16

## 2021-01-11 NOTE — PROGRESS NOTE ADULT - SUBJECTIVE AND OBJECTIVE BOX
less  sob    REVIEW OF SYSTEMS:  GEN: no fever,    no chills  RESP: no SOB,   no cough  CVS: no chest pain,   no palpitations  GI: no abdominal pain,   no nausea,   no vomiting,   no constipation,   no diarrhea  : no dysuria,   no frequency  NEURO: no headache,   no dizziness  PSYCH: no depression,   not anxious  Derm : no rash    MEDICATIONS  (STANDING):  allopurinol 100 milliGRAM(s) Oral daily  amoxicillin  500 milliGRAM(s)/clavulanate 1 Tablet(s) Oral every 12 hours  BACItracin   Ointment 1 Application(s) Topical two times a day  budesonide 160 MICROgram(s)/formoterol 4.5 MICROgram(s) Inhaler 2 Puff(s) Inhalation two times a day  buMETAnide 1 milliGRAM(s) Oral every 12 hours  metoprolol succinate ER 25 milliGRAM(s) Oral daily  simvastatin 10 milliGRAM(s) Oral at bedtime    MEDICATIONS  (PRN):      Vital Signs Last 24 Hrs  T(C): 36.9 (11 Jan 2021 04:27), Max: 36.9 (11 Jan 2021 04:27)  T(F): 98.4 (11 Jan 2021 04:27), Max: 98.4 (11 Jan 2021 04:27)  HR: 66 (11 Jan 2021 04:27) (65 - 78)  BP: 134/71 (11 Jan 2021 04:27) (121/64 - 134/71)  BP(mean): --  RR: 18 (11 Jan 2021 04:27) (18 - 18)  SpO2: 95% (11 Jan 2021 04:27) (94% - 95%)  CAPILLARY BLOOD GLUCOSE        I&O's Summary    10 Ruddy 2021 07:01  -  11 Jan 2021 07:00  --------------------------------------------------------  IN: 1080 mL / OUT: 1250 mL / NET: -170 mL        PHYSICAL EXAM:  HEAD:  Atraumatic, Normocephalic  NECK: Supple, No   JVD  CHEST/LUNG:   no     rales,     no,    rhonchi  HEART: Regular rate and rhythm;         murmur  ABDOMEN: Soft, Nontender, ;   EXTREMITIES:  less      edema  NEUROLOGY:  alert    LABS:                        8.2    9.67  )-----------( 409      ( 10 Ruddy 2021 06:35 )             29.8     01-10    140  |  97  |  54<H>  ----------------------------<  107<H>  4.8   |  35<H>  |  1.52<H>    Ca    10.0      10 Ruddy 2021 06:35  Mg     2.2     01-10      PT/INR - ( 10 Ruddy 2021 06:35 )   PT: 34.9 sec;   INR: 3.07 ratio                         Thyroid Stimulating Hormone, Serum: 1.630 uIU/mL (01-02 @ 06:57)          Consultant(s) Notes Reviewed:      Care Discussed with Consultants/Other Providers:

## 2021-01-11 NOTE — PROGRESS NOTE ADULT - ASSESSMENT
70 years      h/o   CAD,  mechanical MVR and Tricuspid valve repair,        h/o  chronic   HFpEF,   HTN  . MIGUEL ,     severe MR  ,  s/p mechanical MVR 1999 , [facility] with Dr. Anderson,  and severe TR s/p TVR 2012 ([facility]),       COPD ,on home 02; no prior tobacco use      AFib on Coumadin , failed  ablation,   ,  PHTN,   CKD 3 (b/l SCr ~1.3-1.6),  MIGUEL and gout.     h/o   hypercarbia and she reports  needing Bipap,in the past,  but hasn't used in 10 years.      Morbid  obesity,  BMI is 41     presented with  CP,  dizziness and sob     She also had epistaxis at [facility],/         1.,  Atrial fibrillation       metoprolol ER 25 mg  / lipitor     2.  MVR  mechanical,1999  Continue IV heparin and warfarin with goal INR of 2.5-3.5 with mechanical MVR   daily INR   3.  TVR, 2012 at [facility]   4.  HTN, on Toprol   5. Dizziness , since resolved  Ct head , with infarcts, Right b. ganglia and Right cerebellum   6. .  Anemia,  has  c/c  anemia, , baseline is  8  to 9/   last colonoscopy  was  10 yrs  ago/ s/p melena last yr. and, no w/p was done.,as  pt was  deemed high risk  for  any procedure  7.  Acute  Diastolic  Chf    on  iv bumex bid       daily weights/ follow   I/O//   still  with sob on exertion  8.  Severe Pulm Htn, on echo  Echo  12/20,normal  ef/mod  MS/  severe Pulm Htn  per card dr mcnamara , pt   transferred to  [place]   for ELZIABETH/ ? fistula  from aorta  to RV/     s/p epistaxis  seen by ent  heart  failure team following pt / has lost 3  kg  ELIZABETH,  cancelled  per  floor  by echo dept/  spoke  with  chf   Dr alba from 1/8.  MVR/  decreased  RV function/ no pfo/vsd  on coumadin/ follow inr  pe  ENT, packing removal on 1/12  Mechanical MVR./  on coumadin  labs pending / follow  INR       < from: Transthoracic Echocardiogram (01.08.21 @ 15:53) >  Conclusions:  1. Mechanical prosthetic mitral valve replacement. Peak  mitral valve gradient equals 15 mm Hg, mean transmitral  valve gradient equals 6 mm Hg, which is elevated even in  the setting of a mechanical prosthetic mitral valve  replacement (Heart rate 69 bpm).  2. Refer to prior echos for assesssment of LV function.  Septal motion consistent with cardiac surgery, right  ventricular overload.  3. Right atrial enlargement.  4. Right ventricular enlargement with decreased right  ventricular systolic function.  5. An annuloplasty ring is seen in the tricuspid position.  PG 3 mm hg, MG 1 mm hg. No tricuspid regurgitation.  6. Agitated saline injection demonstrates evidence of a  patent foramen ovale/VSD.  No color Doppler evidence of  < end of copied text >     < from: CT Head No Cont (12.29.20 @ 20:41)   IMPRESSION: No acute intracranial hemorrhage. Age-indeterminate right basal ganglia lacunar infarct and right cerebellar lacunar infarct. If there is clinical suspicion for acute infarct, consider brain MRI if there is no contraindication.  < end of copied text >    < from: TTE Echo Complete w/ Contrast w/ Doppler (12.31.20 @ 14:01) >  ummary:   1. Left ventricular ejection fraction, by visual estimation, is 60 to 65%.   2. Technically suboptimal study.   3. Normal global left ventricular systolic function.   4. Normal left ventricular internal cavity size.   5. Spectral Doppler shows pseudonormal pattern of left ventricular myocardial filling (Grade II diastolic dysfunction).   6. Normal right ventricular size and function.   7. Mild to moderately enlarged left atrium.   8. There is no evidence of pericardial effusion.   9. Mild mitral annular calcification.  10. Mild mitral valve regurgitation.  11. Mild thickening and calcification of the anterior and posterior mitral valve leaflets.  12. Peak transmitral mean gradient equals 6.8 mmHg, calculated mitral valve area by pressure half time equals 1.86 cm² consistent withmild mitral stenosis.  13. Mild tricuspid regurgitation.  14. Mild aortic regurgitation.  15. Sclerotic aortic valve with normal opening.  16. Estimated pulmonary artery systolic pressure is 68.4 mmHg assuming a right atrial pressure of 8 mmHg, whichis consistent with severe pulmonary hypertension.  17. C/w the study of 6-21-20, more significant pulmonary htn is now noted.  18. Endocardial visualization was enhanced with intravenous echo contrast.  19. Mitral valve mean gradient is 6.8 mmHg consistent with moderate mitral stenosis.  Montana Barreto MD FACC, FASE, FAC  < end of copied text >

## 2021-01-11 NOTE — CHART NOTE - NSCHARTNOTEFT_GEN_A_CORE
Called by PT. Pt desaturated to 86% while ambulating (walked the entirety of the hallway and back) while on 3 L NC. Saw pt at bedside, who states she felt dyspneic at time desaturation. States she typically feels this way when she walks at home. Currently denies SOB while at rest. Denies chest pain, headache, fever, leg pain.      Vital Signs Last 24 Hrs  T(C): 36.9 (11 Jan 2021 14:16), Max: 36.9 (11 Jan 2021 04:27)  T(F): 98.5 (11 Jan 2021 14:16), Max: 98.5 (11 Jan 2021 11:24)  HR: 74 (11 Jan 2021 15:41) (66 - 78)  BP: 117/64 (11 Jan 2021 15:41) (117/64 - 134/71)  BP(mean): --  RR: 18 (11 Jan 2021 14:16) (18 - 18)  SpO2: 97% (11 Jan 2021 14:16) (94% - 97%)    Physical Exam:  General: WN/WD NAD  Neurology: A&Ox3, nonfocal, MARMOLEJO x 4  Head:  Normocephalic, atraumatic  Respiratory: CTA B/L  CV: RRR, S1S2  MSK: minimal edema, + peripheral pulses, FROM all 4 extremity  Labs:                          8.2    9.67  )-----------( 409      ( 10 Ruddy 2021 06:35 )             29.8     01-11    137  |  94<L>  |  50<H>  ----------------------------<  90  4.4   |  33<H>  |  1.48<H>    Ca    10.2      11 Jan 2021 07:17  Mg     2.2     01-10      On Tele pt alternates between NSR and afib, rate controlled        Assessment & Plan:  71 y/o female with   h/o   CAD,  mechanical MVR and Tricuspid valve repair,  h/o  chronic   HFpEF,   HTN  . MIGUEL , severe MR  ,  s/p mechanical MVR 1999 , [facility] with Dr. Anderson,  and severe TR s/p TVR 2012 ([facility]), COPD ,on home 02; no prior tobacco use,  AFib on Coumadin , failed  ablation,   ,  PHTN,   CKD 3 (b/l SCr ~1.3-1.6),  MIGUEL and gout,    Morbid  obesity,  admitted for ADHF and epistaxis, seen for dyspnea while working with PT and desaturation to 86% while ambulating this afternoon, now at 96-97% on 3 L NC.    > Portable CXR to assess for fluid overload. If evidence of fluid overload, will administer Bumex .5 mg IV push  > Continue to monitor symptoms  > D/w Dr. Turpin, who is in agreement. Will sign out to night team    -Nhung Martinez PA-C  #[id number]    Nhung Martinez PA-C (Medicine PA)  #

## 2021-01-11 NOTE — PROGRESS NOTE ADULT - ASSESSMENT
69 yo female with a PMHx of Morbid Obesity, CAD, HFpEF (EF 65-60%), severe MR s/p mechanical MVR 1999 (Dr. Anderson, [facility]), severe TR s/p mechanical TVR (2012 [facility]), afib s/p ablation, HTN, HLD, CKD (bl 1.35-1.5), COPD on 3L NC at home, MIGUEL who presented to the [facility] for cp, sob, dizziness x 1 week, found to be in decompensated HFpEF, w/ tachy-ofelia rhythm  s/p epistaxis with control of bleeding with nasal packing  Known to have anemia with prior history of GIB 12/2019 - too high risk for endoscopic procedures at that time.  Currently without overt/brisk GI bleed    RECS:  -Diurese per Heart Failure team  -AC per primary team  -no role for invasive GI evaluation at this time  -PPI for GI prophylaxis  -monitor CBC and BMs (though given significant recent epistaxis, stool may be black from swallowed blood)  -ENT following    Further care per primary team  Stable GI issues Will Sign off care. Please recall prn for acute  GI concerns.  Thank You.    Pan Lawson PA-C    [facility]  [phone]  After hours and weekend coverage [phone]

## 2021-01-11 NOTE — PROGRESS NOTE ADULT - SUBJECTIVE AND OBJECTIVE BOX
ENT ISSUE/POD: epistaxis     HPI: 71 yo female seen for epistaxis. Pt was previously seen by ENT for epistaxis, nasal packing was removed and pt began to have brisk bleeding from right nare shortly after removal. At bedside patient was cauterized with silver nitrate and a posterior rapid rhino was placed. Pt is on coumadin and heparin, INR 2.73.         PAST MEDICAL & SURGICAL HISTORY:  COPD (chronic obstructive pulmonary disease)    Hypertension    CHF (congestive heart failure)    Tricuspid valve replaced    Mitral valve replaced    Gout    HTN (hypertension)    CHF (congestive heart failure)    COPD (chronic obstructive pulmonary disease)  on home O2    MIGUEL (obstructive sleep apnea)    Pulmonary hypertension    Atrial fibrillation  S/P Ablation    No significant past surgical history    Tricuspid valve replaced  mechanical    History of mitral valve replacement with mechanical valve      Allergies    No Known Allergies    Intolerances      MEDICATIONS  (STANDING):  allopurinol 100 milliGRAM(s) Oral daily  amoxicillin  500 milliGRAM(s)/clavulanate 1 Tablet(s) Oral every 12 hours  BACItracin   Ointment 1 Application(s) Topical two times a day  budesonide 160 MICROgram(s)/formoterol 4.5 MICROgram(s) Inhaler 2 Puff(s) Inhalation two times a day  buMETAnide 1 milliGRAM(s) Oral every 12 hours  metoprolol succinate ER 25 milliGRAM(s) Oral daily  simvastatin 10 milliGRAM(s) Oral at bedtime  warfarin 5 milliGRAM(s) Oral once    MEDICATIONS  (PRN):      Vital Signs Last 24 Hrs  T(C): 36.9 (11 Jan 2021 04:27), Max: 36.9 (11 Jan 2021 04:27)  T(F): 98.4 (11 Jan 2021 04:27), Max: 98.4 (11 Jan 2021 04:27)  HR: 66 (11 Jan 2021 04:27) (65 - 78)  BP: 134/71 (11 Jan 2021 04:27) (121/64 - 134/71)  BP(mean): --  RR: 18 (11 Jan 2021 07:00) (18 - 18)  SpO2: 95% (11 Jan 2021 07:00) (94% - 95%)                          8.2    9.67  )-----------( 409      ( 10 Ruddy 2021 06:35 )             29.8    01-11    137  |  94<L>  |  50<H>  ----------------------------<  90  4.4   |  33<H>  |  1.48<H>    Ca    10.2      11 Jan 2021 07:17  Mg     2.2     01-10     PT/INR - ( 11 Jan 2021 07:21 )   PT: 31.2 sec;   INR: 2.73 ratio             PHYSICAL EXAM:  Gen: NAD  Skin: No rashes, bruises, or lesions  Head: Normocephalic, Atraumatic  Face: no edema, erythema, or fluctuance. Parotid glands soft without mass  Eyes: no scleral injection  Nose: Brisk bleeding from right nare, controlled with silver nitrate cauterization and packed with rapid rhino 7.5, inflated with 8cc air. No further bleeding noted.   Mouth: No Stridor / Drooling / Trismus.  Mucosa moist, tongue/uvula midline, oropharynx clear, no bleeding in posterior pharynx after packing placed.   Neck: Flat, supple, no lymphadenopathy, trachea midline, no masses  Lymphatic: No lymphadenopathy  Resp: breathing easily, no stridor  Neuro: facial nerve intact, no facial droop

## 2021-01-11 NOTE — PROGRESS NOTE ADULT - PROBLEM SELECTOR PLAN 1
- plan for packing removal 1/12  - gram-positive abx coverage for duration of packing placement  - Strict blood pressure control.  - Nasal saline, 2 sprays to both nares 4 times a day  - Avoid nasal trauma; no nose rubbing, blowing or manipulating nasal packing.  Sneeze with mouth open and pinching nares.  - Avoid bending with head blow the waist.    -No heavy lifting. - plan for packing removal 1/12, will deflate first to confirm good control prior to removal given recent rebleed  - gram-positive abx coverage for duration of packing placement  - Strict blood pressure control.  - Nasal saline, 2 sprays to both nares 4 times a day  - Avoid nasal trauma; no nose rubbing, blowing or manipulating nasal packing.  Sneeze with mouth open and pinching nares.  - Avoid bending with head blow the waist.    -No heavy lifting.

## 2021-01-11 NOTE — PROGRESS NOTE ADULT - ASSESSMENT
71yo female seen for right epistaxis, controlled with cauterization and rapid rhino nasal packing in right nare.

## 2021-01-11 NOTE — PROGRESS NOTE ADULT - SUBJECTIVE AND OBJECTIVE BOX
Patient is a 70y old  Female who presented with a chief complaint of chf/ chf team (11 Jan 2021 09:24)      INTERVAL HPI/OVERNIGHT EVENTS:  brown formed stools  recurrent epistaxis over weekend - rhino rocket replaced  no abdominal pain  denies CP or SOB    MEDICATIONS  (STANDING):  allopurinol 100 milliGRAM(s) Oral daily  amoxicillin  500 milliGRAM(s)/clavulanate 1 Tablet(s) Oral every 12 hours  BACItracin   Ointment 1 Application(s) Topical two times a day  budesonide 160 MICROgram(s)/formoterol 4.5 MICROgram(s) Inhaler 2 Puff(s) Inhalation two times a day  buMETAnide 1 milliGRAM(s) Oral every 12 hours  metoprolol succinate ER 25 milliGRAM(s) Oral daily  simvastatin 10 milliGRAM(s) Oral at bedtime  warfarin 5 milliGRAM(s) Oral once    MEDICATIONS  (PRN):      Allergies  No Known Allergies      Review of Systems:  General:  No wt loss, fevers, chills, night sweats  CV:  No current pain, see HPI  Resp:  No dyspnea, cough, tachypnea,  +SOB +COPD - on home O2  GI:  No pain, No nausea, No vomiting, No diarrhea, No constipation, No weight loss, No fever, No pruritis, No rectal bleeding, No tarry stools, No dysphagia,  :  No pain, bleeding, incontinence, nocturia  Muscle:  No pain, weakness  Neuro:  No focal weakness, tingling, memory problems  Psych:  No fatigue, insomnia, mood problems, depression  Endocrine:  No polyuria, polydypsia, cold/heat intolerance  Heme:  No petechiae, ecchymosis, easy bruisability  +AC +s/p recent epistaxis  Skin:  No rash, tattoos, scars, +edema      Vital Signs Last 24 Hrs  T(C): 36.9 (11 Jan 2021 11:24), Max: 36.9 (11 Jan 2021 04:27)  T(F): 98.5 (11 Jan 2021 11:24), Max: 98.5 (11 Jan 2021 11:24)  HR: 71 (11 Jan 2021 11:24) (66 - 78)  BP: 124/72 (11 Jan 2021 11:24) (121/64 - 134/71)  BP(mean): --  RR: 18 (11 Jan 2021 11:24) (18 - 18)  SpO2: 96% (11 Jan 2021 11:24) (94% - 96%)    PHYSICAL EXAM:  Constitutional: NAD, morbidly obese AA female OOB to chair  Neck: No LAD, supple  Respiratory: basilar rales, no wheeze  Cardiovascular: S1 and S2, RRR,  +mechanical click  Gastrointestinal: BS+, obese soft, NT/ND  Extremities: tr. edema b/l  neg clubbing, cyanosis  Vascular: 2+ peripheral pulses  Neurological: A/O x 3, no focal deficits  Psychiatric: Normal mood, normal affect  Skin: No rashes, anicteric      LABS:                        8.2    9.67  )-----------( 409      ( 10 Ruddy 2021 06:35 )             29.8     01-11    137  |  94<L>  |  50<H>  ----------------------------<  90  4.4   |  33<H>  |  1.48<H>    Ca    10.2      11 Jan 2021 07:17  Mg     2.2     01-10      PT/INR - ( 11 Jan 2021 07:21 )   PT: 31.2 sec;   INR: 2.73 ratio          RADIOLOGY & ADDITIONAL TESTS:

## 2021-01-12 ENCOUNTER — TRANSCRIPTION ENCOUNTER (OUTPATIENT)
Age: 71
End: 2021-01-12

## 2021-01-12 LAB
ANION GAP SERPL CALC-SCNC: 7 MMOL/L — SIGNIFICANT CHANGE UP (ref 5–17)
BUN SERPL-MCNC: 55 MG/DL — HIGH (ref 7–23)
CALCIUM SERPL-MCNC: 9.5 MG/DL — SIGNIFICANT CHANGE UP (ref 8.4–10.5)
CHLORIDE SERPL-SCNC: 94 MMOL/L — LOW (ref 96–108)
CO2 SERPL-SCNC: 36 MMOL/L — HIGH (ref 22–31)
CREAT SERPL-MCNC: 1.6 MG/DL — HIGH (ref 0.5–1.3)
GLUCOSE SERPL-MCNC: 95 MG/DL — SIGNIFICANT CHANGE UP (ref 70–99)
HCT VFR BLD CALC: 30.3 % — LOW (ref 34.5–45)
HGB BLD-MCNC: 8.7 G/DL — LOW (ref 11.5–15.5)
INR BLD: 2.36 RATIO — HIGH (ref 0.88–1.16)
MCHC RBC-ENTMCNC: 26.1 PG — LOW (ref 27–34)
MCHC RBC-ENTMCNC: 28.7 GM/DL — LOW (ref 32–36)
MCV RBC AUTO: 91 FL — SIGNIFICANT CHANGE UP (ref 80–100)
NRBC # BLD: 0 /100 WBCS — SIGNIFICANT CHANGE UP (ref 0–0)
PLATELET # BLD AUTO: 409 K/UL — HIGH (ref 150–400)
POTASSIUM SERPL-MCNC: 4.2 MMOL/L — SIGNIFICANT CHANGE UP (ref 3.5–5.3)
POTASSIUM SERPL-SCNC: 4.2 MMOL/L — SIGNIFICANT CHANGE UP (ref 3.5–5.3)
PROTHROM AB SERPL-ACNC: 27.2 SEC — HIGH (ref 10.6–13.6)
RBC # BLD: 3.33 M/UL — LOW (ref 3.8–5.2)
RBC # FLD: 16.6 % — HIGH (ref 10.3–14.5)
SODIUM SERPL-SCNC: 137 MMOL/L — SIGNIFICANT CHANGE UP (ref 135–145)
WBC # BLD: 6.46 K/UL — SIGNIFICANT CHANGE UP (ref 3.8–10.5)
WBC # FLD AUTO: 6.46 K/UL — SIGNIFICANT CHANGE UP (ref 3.8–10.5)

## 2021-01-12 PROCEDURE — 99232 SBSQ HOSP IP/OBS MODERATE 35: CPT

## 2021-01-12 PROCEDURE — 99233 SBSQ HOSP IP/OBS HIGH 50: CPT

## 2021-01-12 RX ORDER — BUMETANIDE 0.25 MG/ML
2 INJECTION INTRAMUSCULAR; INTRAVENOUS
Refills: 0 | Status: DISCONTINUED | OUTPATIENT
Start: 2021-01-12 | End: 2021-01-20

## 2021-01-12 RX ORDER — WARFARIN SODIUM 2.5 MG/1
7 TABLET ORAL ONCE
Refills: 0 | Status: COMPLETED | OUTPATIENT
Start: 2021-01-12 | End: 2021-01-12

## 2021-01-12 RX ADMIN — Medication 1 APPLICATION(S): at 06:20

## 2021-01-12 RX ADMIN — SIMVASTATIN 10 MILLIGRAM(S): 20 TABLET, FILM COATED ORAL at 23:01

## 2021-01-12 RX ADMIN — BUDESONIDE AND FORMOTEROL FUMARATE DIHYDRATE 2 PUFF(S): 160; 4.5 AEROSOL RESPIRATORY (INHALATION) at 17:05

## 2021-01-12 RX ADMIN — BUMETANIDE 1 MILLIGRAM(S): 0.25 INJECTION INTRAMUSCULAR; INTRAVENOUS at 06:19

## 2021-01-12 RX ADMIN — Medication 100 MILLIGRAM(S): at 09:17

## 2021-01-12 RX ADMIN — Medication 25 MILLIGRAM(S): at 06:19

## 2021-01-12 RX ADMIN — Medication 1 APPLICATION(S): at 17:07

## 2021-01-12 RX ADMIN — BUMETANIDE 2 MILLIGRAM(S): 0.25 INJECTION INTRAMUSCULAR; INTRAVENOUS at 17:05

## 2021-01-12 RX ADMIN — Medication 1 TABLET(S): at 17:05

## 2021-01-12 RX ADMIN — Medication 1 TABLET(S): at 06:20

## 2021-01-12 RX ADMIN — BUDESONIDE AND FORMOTEROL FUMARATE DIHYDRATE 2 PUFF(S): 160; 4.5 AEROSOL RESPIRATORY (INHALATION) at 06:20

## 2021-01-12 RX ADMIN — Medication 1 SPRAY(S): at 06:20

## 2021-01-12 RX ADMIN — WARFARIN SODIUM 7 MILLIGRAM(S): 2.5 TABLET ORAL at 23:01

## 2021-01-12 NOTE — PROGRESS NOTE ADULT - ASSESSMENT
71 yo female with a PMHx of Morbid Obesity, CAD, HFpEF (EF 65-60%), severe MR s/p mechanical MVR 1999 (Dr. Anderson, [facility]), severe TR s/p mechanical TVR (2012 [facility]), afib s/p ablation, HTN, HLD, CKD (bl 1.35-1.5), COPD on 3L NC at home, MIGUEL who presented to the [facility] for cp, sob, dizziness x 1 week, found to be in decompensated HFpEF, w/ tachy-ofelia rhythm  s/p epistaxis with control of bleeding with nasal packing  Known to have anemia with prior history of GIB 12/2019 - too high risk for endoscopic procedures at that time.  Currently without overt/brisk GI bleed    RECS:  -Diurese per Heart Failure team  -AC per primary team  -no role for invasive GI evaluation at this time  -PPI for GI prophylaxis  -monitor CBC and BMs (though given significant recent epistaxis, stool may be black from swallowed blood)  -ENT following    Further care per primary team    Stable GI issues Will Sign off care. Please recall prn for acute  GI concerns.  Thank You.    Pan Lawson PA-C    [facility]  [phone]  After hours and weekend coverage [phone]

## 2021-01-12 NOTE — DISCHARGE NOTE PROVIDER - INSTRUCTIONS
Reduced sodium and reduced cholesterol diet  - 1500mL fluid restriction  - Ensure Enlive, 1 can daily  - Probiotic Yogurt/Smoothie Cans daily

## 2021-01-12 NOTE — PROGRESS NOTE ADULT - PROBLEM SELECTOR PLAN 4
- s/p ablation many years ago  - rate controlled on Toprol 25mg daily  - c/w daily coumadin dosing per primary team

## 2021-01-12 NOTE — PROGRESS NOTE ADULT - SUBJECTIVE AND OBJECTIVE BOX
no cp  sob on exertion    REVIEW OF SYSTEMS:  GEN: no fever,    no chills  RESP: no SOB,   no cough  CVS: no chest pain,   no palpitations  GI: no abdominal pain,   no nausea,   no vomiting,   no constipation,   no diarrhea  : no dysuria,   no frequency  NEURO: no headache,   no dizziness  PSYCH: no depression,   not anxious  Derm : no rash    MEDICATIONS  (STANDING):  allopurinol 100 milliGRAM(s) Oral daily  amoxicillin  500 milliGRAM(s)/clavulanate 1 Tablet(s) Oral every 12 hours  BACItracin   Ointment 1 Application(s) Topical two times a day  budesonide 160 MICROgram(s)/formoterol 4.5 MICROgram(s) Inhaler 2 Puff(s) Inhalation two times a day  buMETAnide 2 milliGRAM(s) Oral two times a day  metoprolol succinate ER 25 milliGRAM(s) Oral daily  simvastatin 10 milliGRAM(s) Oral at bedtime  warfarin 7 milliGRAM(s) Oral once    MEDICATIONS  (PRN):  sodium chloride 0.65% Nasal 1 Spray(s) Both Nostrils two times a day PRN Nasal Congestion      Vital Signs Last 24 Hrs  T(C): 36.8 (12 Jan 2021 04:35), Max: 37.1 (11 Jan 2021 20:00)  T(F): 98.3 (12 Jan 2021 04:35), Max: 98.8 (11 Jan 2021 20:00)  HR: 60 (12 Jan 2021 04:35) (60 - 74)  BP: 107/60 (12 Jan 2021 04:35) (107/60 - 124/72)  BP(mean): --  RR: 18 (12 Jan 2021 04:35) (18 - 18)  SpO2: 99% (12 Jan 2021 04:35) (96% - 99%)  CAPILLARY BLOOD GLUCOSE        I&O's Summary    11 Jan 2021 07:01  -  12 Jan 2021 07:00  --------------------------------------------------------  IN: 1020 mL / OUT: 2050 mL / NET: -1030 mL        PHYSICAL EXAM:  HEAD:  Atraumatic, Normocephalic  NECK: Supple, No   JVD  CHEST/LUNG:   no     rales,     no,    rhonchi  HEART: Regular rate and rhythm;         murmur  ABDOMEN: Soft, Nontender, ;   EXTREMITIES:   less     edema  NEUROLOGY:  alert    LABS:                        8.7    6.46  )-----------( 409      ( 12 Jan 2021 07:11 )             30.3     01-12    137  |  94<L>  |  55<H>  ----------------------------<  95  4.2   |  36<H>  |  1.60<H>    Ca    9.5      12 Jan 2021 07:11      PT/INR - ( 12 Jan 2021 07:11 )   PT: 27.2 sec;   INR: 2.36 ratio                         Thyroid Stimulating Hormone, Serum: 1.630 uIU/mL (01-02 @ 06:57)          Consultant(s) Notes Reviewed:      Care Discussed with Consultants/Other Providers:     breast

## 2021-01-12 NOTE — PROGRESS NOTE ADULT - SUBJECTIVE AND OBJECTIVE BOX
Patient is a 70y old  Female who presented with a chief complaint of chf/ chf team (12 Jan 2021 11:07)      INTERVAL HPI/OVERNIGHT EVENTS:  no CP   +SOB with exertion    stools remain brown    MEDICATIONS  (STANDING):  allopurinol 100 milliGRAM(s) Oral daily  amoxicillin  500 milliGRAM(s)/clavulanate 1 Tablet(s) Oral every 12 hours  BACItracin   Ointment 1 Application(s) Topical two times a day  budesonide 160 MICROgram(s)/formoterol 4.5 MICROgram(s) Inhaler 2 Puff(s) Inhalation two times a day  buMETAnide 2 milliGRAM(s) Oral two times a day  metoprolol succinate ER 25 milliGRAM(s) Oral daily  simvastatin 10 milliGRAM(s) Oral at bedtime  warfarin 7 milliGRAM(s) Oral once    MEDICATIONS  (PRN):  sodium chloride 0.65% Nasal 1 Spray(s) Both Nostrils two times a day PRN Nasal Congestion      Allergies  No Known Allergies      Review of Systems:  General:  No wt loss, fevers, chills, night sweats  CV:  No current pain, see HPI  Resp:  No dyspnea, cough, tachypnea,  +SOB +COPD - on home O2  GI:  No pain, No nausea, No vomiting, No diarrhea, No constipation, No weight loss, No fever, No pruritis, No rectal bleeding, No tarry stools, No dysphagia,  :  No pain, bleeding, incontinence, nocturia  Muscle:  No pain, weakness  Neuro:  No focal weakness, tingling, memory problems  Psych:  No fatigue, insomnia, mood problems, depression  Endocrine:  No polyuria, polydypsia, cold/heat intolerance  Heme:  No petechiae, ecchymosis, easy bruisability  +AC +s/p recent epistaxis  Skin:  No rash, tattoos, scars, +edema      Vital Signs Last 24 Hrs  T(C): 36.9 (12 Jan 2021 11:51), Max: 37.1 (11 Jan 2021 20:00)  T(F): 98.5 (12 Jan 2021 11:51), Max: 98.8 (11 Jan 2021 20:00)  HR: 59 (12 Jan 2021 11:51) (59 - 74)  BP: 114/65 (12 Jan 2021 11:51) (107/60 - 123/67)  BP(mean): --  RR: 18 (12 Jan 2021 11:51) (18 - 18)  SpO2: 98% (12 Jan 2021 11:51) (97% - 99%)    PHYSICAL EXAM:  Constitutional: NAD, morbidly obese AA female OOB to chair  Neck: No LAD, supple  Respiratory: basilar rales, no wheeze  Cardiovascular: S1 and S2, RRR,  +mechanical click  Gastrointestinal: BS+, obese soft, NT/ND  Extremities: tr. edema b/l  neg clubbing, cyanosis  Vascular: 2+ peripheral pulses  Neurological: A/O x 3, no focal deficits  Psychiatric: Normal mood, normal affect  Skin: No rashes, anicteric      LABS:                        8.7    6.46  )-----------( 409      ( 12 Jan 2021 07:11 )             30.3     01-12    137  |  94<L>  |  55<H>  ----------------------------<  95  4.2   |  36<H>  |  1.60<H>    Ca    9.5      12 Jan 2021 07:11      PT/INR - ( 12 Jan 2021 07:11 )   PT: 27.2 sec;   INR: 2.36 ratio          RADIOLOGY & ADDITIONAL TESTS:    EXAM:  XR CHEST PORTABLE URGENT 1V                            PROCEDURE DATE:  01/11/2021            INTERPRETATION:  TIME OF EXAM: January 11, 2021 at 5:38 PM.    CLINICAL INFORMATION: COPD. Desaturation on ambulation.    COMPARISON:  December 29,2020.    TECHNIQUE:   AP Portable chest x-ray. Limited by rotation. The chin obscures part of the upper image.    INTERPRETATION:    Heart size and the mediastinum cannot be accurately evaluated on this projection. Median sternotomy sutures, surgicalclips, and prosthetic cardiac valve again seen.  Elevated left hemidiaphragm again seen.  No definite focal lung consolidation seen.  There is continued accentuation of the central pulmonary vascular markings suggesting pulmonary vascular congestion.  No pleural effusion or pneumothorax noted.        IMPRESSION:  Elevated left hemidiaphragm again seen.    Continued accentuation of central pulmonary vascular markings suggesting pulmonary vascular congestion.

## 2021-01-12 NOTE — DISCHARGE NOTE PROVIDER - NSDCHHNEEDSERVICE_GEN_ALL_CORE
Medication teaching and assessment/Rehabilitation services Medication teaching and assessment/Observation and assessment/Rehabilitation services/Other, specify... Medication teaching and assessment/Observation and assessment/Rehabilitation services/Teaching and training/Other, specify...

## 2021-01-12 NOTE — PROGRESS NOTE ADULT - ASSESSMENT
69yo female seen for right epistaxis, controlled with cauterization and rapid rhino nasal packing in right nare. INR 2.36 and H/H 8.7/30.3

## 2021-01-12 NOTE — DISCHARGE NOTE PROVIDER - CARE PROVIDERS DIRECT ADDRESSES
,DirectAddress_Unknown ,DirectAddress_Unknown,[email or web address] ,[email or web address],DirectAddress_Unknown ,[email or web address],[email or web address],[email or web address]

## 2021-01-12 NOTE — DISCHARGE NOTE PROVIDER - DETAILS OF MALNUTRITION DIAGNOSIS/DIAGNOSES
This patient has been assessed with a concern for Malnutrition and was treated during this hospitalization for the following Nutrition diagnosis/diagnoses:     -  01/08/2021: Morbid obesity (BMI > 40)   This patient has been assessed with a concern for Malnutrition and was treated during this hospitalization for the following Nutrition diagnosis/diagnoses:     -  01/08/2021: Morbid obesity (BMI > 40)    This patient has been assessed with a concern for Malnutrition and was treated during this hospitalization for the following Nutrition diagnosis/diagnoses:     -  01/08/2021: Morbid obesity (BMI > 40)   This patient has been assessed with a concern for Malnutrition and was treated during this hospitalization for the following Nutrition diagnosis/diagnoses:     -  01/08/2021: Morbid obesity (BMI > 40)    This patient has been assessed with a concern for Malnutrition and was treated during this hospitalization for the following Nutrition diagnosis/diagnoses:     -  01/08/2021: Morbid obesity (BMI > 40)    This patient has been assessed with a concern for Malnutrition and was treated during this hospitalization for the following Nutrition diagnosis/diagnoses:     -  01/08/2021: Morbid obesity (BMI > 40)   This patient has been assessed with a concern for Malnutrition and was treated during this hospitalization for the following Nutrition diagnosis/diagnoses:     -  01/08/2021: Morbid obesity (BMI > 40)    This patient has been assessed with a concern for Malnutrition and was treated during this hospitalization for the following Nutrition diagnosis/diagnoses:     -  01/08/2021: Morbid obesity (BMI > 40)    This patient has been assessed with a concern for Malnutrition and was treated during this hospitalization for the following Nutrition diagnosis/diagnoses:     -  01/08/2021: Morbid obesity (BMI > 40)    This patient has been assessed with a concern for Malnutrition and was treated during this hospitalization for the following Nutrition diagnosis/diagnoses:     -  01/08/2021: Morbid obesity (BMI > 40)   This patient has been assessed with a concern for Malnutrition and was treated during this hospitalization for the following Nutrition diagnosis/diagnoses:     -  01/08/2021: Morbid obesity (BMI > 40)    This patient has been assessed with a concern for Malnutrition and was treated during this hospitalization for the following Nutrition diagnosis/diagnoses:     -  01/08/2021: Morbid obesity (BMI > 40)    This patient has been assessed with a concern for Malnutrition and was treated during this hospitalization for the following Nutrition diagnosis/diagnoses:     -  01/08/2021: Morbid obesity (BMI > 40)    This patient has been assessed with a concern for Malnutrition and was treated during this hospitalization for the following Nutrition diagnosis/diagnoses:     -  01/08/2021: Morbid obesity (BMI > 40)    This patient has been assessed with a concern for Malnutrition and was treated during this hospitalization for the following Nutrition diagnosis/diagnoses:     -  01/08/2021: Morbid obesity (BMI > 40)   This patient has been assessed with a concern for Malnutrition and was treated during this hospitalization for the following Nutrition diagnosis/diagnoses:     -  01/08/2021: Morbid obesity (BMI > 40)    This patient has been assessed with a concern for Malnutrition and was treated during this hospitalization for the following Nutrition diagnosis/diagnoses:     -  01/08/2021: Morbid obesity (BMI > 40)    This patient has been assessed with a concern for Malnutrition and was treated during this hospitalization for the following Nutrition diagnosis/diagnoses:     -  01/08/2021: Morbid obesity (BMI > 40)    This patient has been assessed with a concern for Malnutrition and was treated during this hospitalization for the following Nutrition diagnosis/diagnoses:     -  01/08/2021: Morbid obesity (BMI > 40)    This patient has been assessed with a concern for Malnutrition and was treated during this hospitalization for the following Nutrition diagnosis/diagnoses:     -  01/08/2021: Morbid obesity (BMI > 40)    This patient has been assessed with a concern for Malnutrition and was treated during this hospitalization for the following Nutrition diagnosis/diagnoses:     -  01/08/2021: Morbid obesity (BMI > 40)   This patient has been assessed with a concern for Malnutrition and was treated during this hospitalization for the following Nutrition diagnosis/diagnoses:     -  01/08/2021: Morbid obesity (BMI > 40)    This patient has been assessed with a concern for Malnutrition and was treated during this hospitalization for the following Nutrition diagnosis/diagnoses:     -  01/08/2021: Morbid obesity (BMI > 40)    This patient has been assessed with a concern for Malnutrition and was treated during this hospitalization for the following Nutrition diagnosis/diagnoses:     -  01/08/2021: Morbid obesity (BMI > 40)    This patient has been assessed with a concern for Malnutrition and was treated during this hospitalization for the following Nutrition diagnosis/diagnoses:     -  01/08/2021: Morbid obesity (BMI > 40)    This patient has been assessed with a concern for Malnutrition and was treated during this hospitalization for the following Nutrition diagnosis/diagnoses:     -  01/08/2021: Morbid obesity (BMI > 40)    This patient has been assessed with a concern for Malnutrition and was treated during this hospitalization for the following Nutrition diagnosis/diagnoses:     -  01/08/2021: Morbid obesity (BMI > 40)    This patient has been assessed with a concern for Malnutrition and was treated during this hospitalization for the following Nutrition diagnosis/diagnoses:     -  01/08/2021: Morbid obesity (BMI > 40)   This patient has been assessed with a concern for Malnutrition and was treated during this hospitalization for the following Nutrition diagnosis/diagnoses:     -  01/08/2021: Morbid obesity (BMI > 40)    This patient has been assessed with a concern for Malnutrition and was treated during this hospitalization for the following Nutrition diagnosis/diagnoses:     -  01/08/2021: Morbid obesity (BMI > 40)    This patient has been assessed with a concern for Malnutrition and was treated during this hospitalization for the following Nutrition diagnosis/diagnoses:     -  01/08/2021: Morbid obesity (BMI > 40)    This patient has been assessed with a concern for Malnutrition and was treated during this hospitalization for the following Nutrition diagnosis/diagnoses:     -  01/08/2021: Morbid obesity (BMI > 40)    This patient has been assessed with a concern for Malnutrition and was treated during this hospitalization for the following Nutrition diagnosis/diagnoses:     -  01/08/2021: Morbid obesity (BMI > 40)    This patient has been assessed with a concern for Malnutrition and was treated during this hospitalization for the following Nutrition diagnosis/diagnoses:     -  01/08/2021: Morbid obesity (BMI > 40)    This patient has been assessed with a concern for Malnutrition and was treated during this hospitalization for the following Nutrition diagnosis/diagnoses:     -  01/08/2021: Morbid obesity (BMI > 40)    This patient has been assessed with a concern for Malnutrition and was treated during this hospitalization for the following Nutrition diagnosis/diagnoses:     -  01/08/2021: Morbid obesity (BMI > 40)   This patient has been assessed with a concern for Malnutrition and was treated during this hospitalization for the following Nutrition diagnosis/diagnoses:     -  01/08/2021: Morbid obesity (BMI > 40)    This patient has been assessed with a concern for Malnutrition and was treated during this hospitalization for the following Nutrition diagnosis/diagnoses:     -  01/08/2021: Morbid obesity (BMI > 40)    This patient has been assessed with a concern for Malnutrition and was treated during this hospitalization for the following Nutrition diagnosis/diagnoses:     -  01/08/2021: Morbid obesity (BMI > 40)    This patient has been assessed with a concern for Malnutrition and was treated during this hospitalization for the following Nutrition diagnosis/diagnoses:     -  01/08/2021: Morbid obesity (BMI > 40)    This patient has been assessed with a concern for Malnutrition and was treated during this hospitalization for the following Nutrition diagnosis/diagnoses:     -  01/08/2021: Morbid obesity (BMI > 40)    This patient has been assessed with a concern for Malnutrition and was treated during this hospitalization for the following Nutrition diagnosis/diagnoses:     -  01/08/2021: Morbid obesity (BMI > 40)    This patient has been assessed with a concern for Malnutrition and was treated during this hospitalization for the following Nutrition diagnosis/diagnoses:     -  01/08/2021: Morbid obesity (BMI > 40)    This patient has been assessed with a concern for Malnutrition and was treated during this hospitalization for the following Nutrition diagnosis/diagnoses:     - 01/08/2021: Morbid obesity (BMI > 40)    This patient has been assessed with a concern for Malnutrition and was treated during this hospitalization for the following Nutrition diagnosis/diagnoses:     -  01/08/2021: Morbid obesity (BMI > 40)   This patient has been assessed with a concern for Malnutrition and was treated during this hospitalization for the following Nutrition diagnosis/diagnoses:     -  01/08/2021: Morbid obesity (BMI > 40)    This patient has been assessed with a concern for Malnutrition and was treated during this hospitalization for the following Nutrition diagnosis/diagnoses:     -  01/08/2021: Morbid obesity (BMI > 40)    This patient has been assessed with a concern for Malnutrition and was treated during this hospitalization for the following Nutrition diagnosis/diagnoses:     -  01/08/2021: Morbid obesity (BMI > 40)    This patient has been assessed with a concern for Malnutrition and was treated during this hospitalization for the following Nutrition diagnosis/diagnoses:     -  01/08/2021: Morbid obesity (BMI > 40)    This patient has been assessed with a concern for Malnutrition and was treated during this hospitalization for the following Nutrition diagnosis/diagnoses:     - 01/08/2021: Morbid obesity (BMI > 40)    This patient has been assessed with a concern for Malnutrition and was treated during this hospitalization for the following Nutrition diagnosis/diagnoses:     -  01/08/2021: Morbid obesity (BMI > 40)    This patient has been assessed with a concern for Malnutrition and was treated during this hospitalization for the following Nutrition diagnosis/diagnoses:     -  01/08/2021: Morbid obesity (BMI > 40)    This patient has been assessed with a concern for Malnutrition and was treated during this hospitalization for the following Nutrition diagnosis/diagnoses:     - 01/08/2021: Morbid obesity (BMI > 40)    This patient has been assessed with a concern for Malnutrition and was treated during this hospitalization for the following Nutrition diagnosis/diagnoses:     -  01/08/2021: Morbid obesity (BMI > 40)    This patient has been assessed with a concern for Malnutrition and was treated during this hospitalization for the following Nutrition diagnosis/diagnoses:     -  01/08/2021: Morbid obesity (BMI > 40)   This patient has been assessed with a concern for Malnutrition and was treated during this hospitalization for the following Nutrition diagnosis/diagnoses:     -  01/08/2021: Morbid obesity (BMI > 40)    This patient has been assessed with a concern for Malnutrition and was treated during this hospitalization for the following Nutrition diagnosis/diagnoses:     -  01/08/2021: Morbid obesity (BMI > 40)    This patient has been assessed with a concern for Malnutrition and was treated during this hospitalization for the following Nutrition diagnosis/diagnoses:     - 01/08/2021: Morbid obesity (BMI > 40)    This patient has been assessed with a concern for Malnutrition and was treated during this hospitalization for the following Nutrition diagnosis/diagnoses:     -  01/08/2021: Morbid obesity (BMI > 40)    This patient has been assessed with a concern for Malnutrition and was treated during this hospitalization for the following Nutrition diagnosis/diagnoses:     - 01/08/2021: Morbid obesity (BMI > 40)    This patient has been assessed with a concern for Malnutrition and was treated during this hospitalization for the following Nutrition diagnosis/diagnoses:     - 01/08/2021: Morbid obesity (BMI > 40)    This patient has been assessed with a concern for Malnutrition and was treated during this hospitalization for the following Nutrition diagnosis/diagnoses:     - 01/08/2021: Morbid obesity (BMI > 40)    This patient has been assessed with a concern for Malnutrition and was treated during this hospitalization for the following Nutrition diagnosis/diagnoses:     - 01/08/2021: Morbid obesity (BMI > 40)    This patient has been assessed with a concern for Malnutrition and was treated during this hospitalization for the following Nutrition diagnosis/diagnoses:     - 01/08/2021: Morbid obesity (BMI > 40)    This patient has been assessed with a concern for Malnutrition and was treated during this hospitalization for the following Nutrition diagnosis/diagnoses:     - 01/08/2021: Morbid obesity (BMI > 40)    This patient has been assessed with a concern for Malnutrition and was treated during this hospitalization for the following Nutrition diagnosis/diagnoses:     - 01/08/2021: Morbid obesity (BMI > 40)   This patient has been assessed with a concern for Malnutrition and was treated during this hospitalization for the following Nutrition diagnosis/diagnoses:     -  01/08/2021: Morbid obesity (BMI > 40)    This patient has been assessed with a concern for Malnutrition and was treated during this hospitalization for the following Nutrition diagnosis/diagnoses:     -  01/08/2021: Morbid obesity (BMI > 40)    This patient has been assessed with a concern for Malnutrition and was treated during this hospitalization for the following Nutrition diagnosis/diagnoses:     - 01/08/2021: Morbid obesity (BMI > 40)    This patient has been assessed with a concern for Malnutrition and was treated during this hospitalization for the following Nutrition diagnosis/diagnoses:     -  01/08/2021: Morbid obesity (BMI > 40)    This patient has been assessed with a concern for Malnutrition and was treated during this hospitalization for the following Nutrition diagnosis/diagnoses:     - 01/08/2021: Morbid obesity (BMI > 40)    This patient has been assessed with a concern for Malnutrition and was treated during this hospitalization for the following Nutrition diagnosis/diagnoses:     - 01/08/2021: Morbid obesity (BMI > 40)    This patient has been assessed with a concern for Malnutrition and was treated during this hospitalization for the following Nutrition diagnosis/diagnoses:     - 01/08/2021: Morbid obesity (BMI > 40)    This patient has been assessed with a concern for Malnutrition and was treated during this hospitalization for the following Nutrition diagnosis/diagnoses:     - 01/08/2021: Morbid obesity (BMI > 40)    This patient has been assessed with a concern for Malnutrition and was treated during this hospitalization for the following Nutrition diagnosis/diagnoses:     - 01/08/2021: Morbid obesity (BMI > 40)    This patient has been assessed with a concern for Malnutrition and was treated during this hospitalization for the following Nutrition diagnosis/diagnoses:     - 01/08/2021: Morbid obesity (BMI > 40)    This patient has been assessed with a concern for Malnutrition and was treated during this hospitalization for the following Nutrition diagnosis/diagnoses:     - 01/08/2021: Morbid obesity (BMI > 40)    This patient has been assessed with a concern for Malnutrition and was treated during this hospitalization for the following Nutrition diagnosis/diagnoses:     -  01/08/2021: Morbid obesity (BMI > 40)   This patient has been assessed with a concern for Malnutrition and was treated during this hospitalization for the following Nutrition diagnosis/diagnoses:     -  01/08/2021: Morbid obesity (BMI > 40)    This patient has been assessed with a concern for Malnutrition and was treated during this hospitalization for the following Nutrition diagnosis/diagnoses:     -  01/08/2021: Morbid obesity (BMI > 40)    This patient has been assessed with a concern for Malnutrition and was treated during this hospitalization for the following Nutrition diagnosis/diagnoses:     - 01/08/2021: Morbid obesity (BMI > 40)    This patient has been assessed with a concern for Malnutrition and was treated during this hospitalization for the following Nutrition diagnosis/diagnoses:     -  01/08/2021: Morbid obesity (BMI > 40)    This patient has been assessed with a concern for Malnutrition and was treated during this hospitalization for the following Nutrition diagnosis/diagnoses:     - 01/08/2021: Morbid obesity (BMI > 40)    This patient has been assessed with a concern for Malnutrition and was treated during this hospitalization for the following Nutrition diagnosis/diagnoses:     - 01/08/2021: Morbid obesity (BMI > 40)    This patient has been assessed with a concern for Malnutrition and was treated during this hospitalization for the following Nutrition diagnosis/diagnoses:     - 01/08/2021: Morbid obesity (BMI > 40)    This patient has been assessed with a concern for Malnutrition and was treated during this hospitalization for the following Nutrition diagnosis/diagnoses:     - 01/08/2021: Morbid obesity (BMI > 40)    This patient has been assessed with a concern for Malnutrition and was treated during this hospitalization for the following Nutrition diagnosis/diagnoses:     - 01/08/2021: Morbid obesity (BMI > 40)    This patient has been assessed with a concern for Malnutrition and was treated during this hospitalization for the following Nutrition diagnosis/diagnoses:     - 01/08/2021: Morbid obesity (BMI > 40)    This patient has been assessed with a concern for Malnutrition and was treated during this hospitalization for the following Nutrition diagnosis/diagnoses:     - 01/08/2021: Morbid obesity (BMI > 40)    This patient has been assessed with a concern for Malnutrition and was treated during this hospitalization for the following Nutrition diagnosis/diagnoses:     - 01/08/2021: Morbid obesity (BMI > 40)    This patient has been assessed with a concern for Malnutrition and was treated during this hospitalization for the following Nutrition diagnosis/diagnoses:     - 01/08/2021: Morbid obesity (BMI > 40)   This patient has been assessed with a concern for Malnutrition and was treated during this hospitalization for the following Nutrition diagnosis/diagnoses:     -  01/08/2021: Morbid obesity (BMI > 40)    This patient has been assessed with a concern for Malnutrition and was treated during this hospitalization for the following Nutrition diagnosis/diagnoses:     -  01/08/2021: Morbid obesity (BMI > 40)    This patient has been assessed with a concern for Malnutrition and was treated during this hospitalization for the following Nutrition diagnosis/diagnoses:     -  01/08/2021: Morbid obesity (BMI > 40)    This patient has been assessed with a concern for Malnutrition and was treated during this hospitalization for the following Nutrition diagnosis/diagnoses:     -  01/08/2021: Morbid obesity (BMI > 40)    This patient has been assessed with a concern for Malnutrition and was treated during this hospitalization for the following Nutrition diagnosis/diagnoses:     - 01/08/2021: Morbid obesity (BMI > 40)    This patient has been assessed with a concern for Malnutrition and was treated during this hospitalization for the following Nutrition diagnosis/diagnoses:     -  01/08/2021: Morbid obesity (BMI > 40)    This patient has been assessed with a concern for Malnutrition and was treated during this hospitalization for the following Nutrition diagnosis/diagnoses:     -  01/08/2021: Morbid obesity (BMI > 40)    This patient has been assessed with a concern for Malnutrition and was treated during this hospitalization for the following Nutrition diagnosis/diagnoses:     - 01/08/2021: Morbid obesity (BMI > 40)    This patient has been assessed with a concern for Malnutrition and was treated during this hospitalization for the following Nutrition diagnosis/diagnoses:     - 01/08/2021: Morbid obesity (BMI > 40)    This patient has been assessed with a concern for Malnutrition and was treated during this hospitalization for the following Nutrition diagnosis/diagnoses:     - 01/08/2021: Morbid obesity (BMI > 40)    This patient has been assessed with a concern for Malnutrition and was treated during this hospitalization for the following Nutrition diagnosis/diagnoses:     - 01/08/2021: Morbid obesity (BMI > 40)    This patient has been assessed with a concern for Malnutrition and was treated during this hospitalization for the following Nutrition diagnosis/diagnoses:     - 01/08/2021: Morbid obesity (BMI > 40)    This patient has been assessed with a concern for Malnutrition and was treated during this hospitalization for the following Nutrition diagnosis/diagnoses:     - 01/08/2021: Morbid obesity (BMI > 40)    This patient has been assessed with a concern for Malnutrition and was treated during this hospitalization for the following Nutrition diagnosis/diagnoses:     -  01/08/2021: Morbid obesity (BMI > 40)

## 2021-01-12 NOTE — PROGRESS NOTE ADULT - SUBJECTIVE AND OBJECTIVE BOX
ENT ISSUE/POD: epistaxis     HPI: 71 yo female seen for epistaxis. Pt was previously seen by ENT for epistaxis, nasal packing was removed and pt began to have brisk bleeding from right nare shortly after removal. At bedside patient was cauterized with silver nitrate and a posterior rapid rhino was placed. Pt is on coumadin and heparin, INR 2.36.           PAST MEDICAL & SURGICAL HISTORY:  COPD (chronic obstructive pulmonary disease)    Hypertension    CHF (congestive heart failure)    Tricuspid valve replaced    Mitral valve replaced    Gout    HTN (hypertension)    CHF (congestive heart failure)    COPD (chronic obstructive pulmonary disease)  on home O2    MIGUEL (obstructive sleep apnea)    Pulmonary hypertension    Atrial fibrillation  S/P Ablation    No significant past surgical history    Tricuspid valve replaced  mechanical    History of mitral valve replacement with mechanical valve      Allergies    No Known Allergies    Intolerances      MEDICATIONS  (STANDING):  allopurinol 100 milliGRAM(s) Oral daily  amoxicillin  500 milliGRAM(s)/clavulanate 1 Tablet(s) Oral every 12 hours  BACItracin   Ointment 1 Application(s) Topical two times a day  budesonide 160 MICROgram(s)/formoterol 4.5 MICROgram(s) Inhaler 2 Puff(s) Inhalation two times a day  buMETAnide 2 milliGRAM(s) Oral two times a day  metoprolol succinate ER 25 milliGRAM(s) Oral daily  simvastatin 10 milliGRAM(s) Oral at bedtime  warfarin 7 milliGRAM(s) Oral once    MEDICATIONS  (PRN):  sodium chloride 0.65% Nasal 1 Spray(s) Both Nostrils two times a day PRN Nasal Congestion      Social History: see consult    Family history: see consult    ROS:   ENT: all negative except as noted in HPI   Pulm: denies SOB, cough, hemoptysis  Neuro: denies numbness/tingling, loss of sensation  Endo: denies heat/cold intolerance, excessive sweating      Vital Signs Last 24 Hrs  T(C): 36.8 (12 Jan 2021 04:35), Max: 37.1 (11 Jan 2021 20:00)  T(F): 98.3 (12 Jan 2021 04:35), Max: 98.8 (11 Jan 2021 20:00)  HR: 60 (12 Jan 2021 04:35) (60 - 74)  BP: 107/60 (12 Jan 2021 04:35) (107/60 - 124/72)  BP(mean): --  RR: 18 (12 Jan 2021 04:35) (18 - 18)  SpO2: 99% (12 Jan 2021 04:35) (96% - 99%)                          8.7    6.46  )-----------( 409      ( 12 Jan 2021 07:11 )             30.3    01-12    137  |  94<L>  |  55<H>  ----------------------------<  95  4.2   |  36<H>  |  1.60<H>    Ca    9.5      12 Jan 2021 07:11     PT/INR - ( 12 Jan 2021 07:11 )   PT: 27.2 sec;   INR: 2.36 ratio             PHYSICAL EXAM:  Gen: NAD  Skin: No rashes, bruises, or lesions  Head: Normocephalic, Atraumatic  Face: no edema, erythema, or fluctuance. Parotid glands soft without mass  Eyes: no scleral injection  Nose: Rapid rhino removed, no active bleeding, bacitracin used to prevent further bleeding, No further bleeding noted.   Mouth: No Stridor / Drooling / Trismus.  Mucosa moist, tongue/uvula midline, oropharynx clear, no bleeding in posterior pharynx.   Neck: Flat, supple, no lymphadenopathy, trachea midline, no masses  Lymphatic: No lymphadenopathy  Resp: breathing easily, no stridor  Neuro: facial nerve intact, no facial droop

## 2021-01-12 NOTE — PROGRESS NOTE ADULT - ASSESSMENT
70 years      h/o   CAD,  mechanical MVR and Tricuspid valve repair,        h/o  chronic   HFpEF,   HTN  . MIGUEL ,     severe MR  ,  s/p mechanical MVR 1999 , [facility] with Dr. Anderson,  and severe TR s/p TVR 2012 ([facility]),       COPD ,on home 02; no prior tobacco use      AFib on Coumadin , failed  ablation,   ,  PHTN,   CKD 3 (b/l SCr ~1.3-1.6),  MIGUEL and gout.     h/o   hypercarbia and she reports  needing Bipap,in the past,  but hasn't used in 10 years.      Morbid  obesity,  BMI is 41     presented with  CP,  dizziness and sob     She also had epistaxis at [facility],/         1.,  Atrial fibrillation       metoprolol ER 25 mg  / lipitor     2.  MVR  mechanical,1999  Continue IV heparin and warfarin with goal INR of 2.5-3.5 with mechanical MVR   daily INR   3.  TVR, 2012 at [facility]   4.  HTN, on Toprol   5. Dizziness , since resolved  Ct head , with infarcts, Right b. ganglia and Right cerebellum   6. .  Anemia,  has  c/c  anemia, , baseline is  8  to 9/   last colonoscopy  was  10 yrs  ago/ s/p melena last yr. and, no w/p was done.,as  pt was  deemed high risk  for  any procedure  7.  Acute  Diastolic  Chf    on  iv bumex bid       daily weights/ follow   I/O//   still  with sob on exertion  8.  Severe Pulm Htn, on echo  Echo  12/20,normal  ef/mod  MS/  severe Pulm Htn  per card dr mcnamara , pt   transferred to  [place]   for ELIZABETH/ ? fistula  from aorta  to RV/     s/p epistaxis  seen by ent  heart  failure team following pt / has lost 3  kg  ELIZABETH,  cancelled  per  floor  by echo dept/  spoke  with  chf   Dr alba from 1/8.  MVR/  decreased  RV function/ no pfo/vsd  on coumadin/ follow inr  pe  ENT, packing removal on 1/12  Mechanical MVR./  on coumadin  on exertion on 1/11, pt with mild  sob and hypoxia  in high  80's. bumex  increased  to 2 mg bid       < from: Transthoracic Echocardiogram (01.08.21 @ 15:53) >  Conclusions:  1. Mechanical prosthetic mitral valve replacement. Peak  mitral valve gradient equals 15 mm Hg, mean transmitral  valve gradient equals 6 mm Hg, which is elevated even in  the setting of a mechanical prosthetic mitral valve  replacement (Heart rate 69 bpm).  2. Refer to prior echos for assesssment of LV function.  Septal motion consistent with cardiac surgery, right  ventricular overload.  3. Right atrial enlargement.  4. Right ventricular enlargement with decreased right  ventricular systolic function.  5. An annuloplasty ring is seen in the tricuspid position.  PG 3 mm hg, MG 1 mm hg. No tricuspid regurgitation.  6. Agitated saline injection demonstrates evidence of a  patent foramen ovale/VSD.  No color Doppler evidence of  < end of copied text >     < from: CT Head No Cont (12.29.20 @ 20:41)   IMPRESSION: No acute intracranial hemorrhage. Age-indeterminate right basal ganglia lacunar infarct and right cerebellar lacunar infarct. If there is clinical suspicion for acute infarct, consider brain MRI if there is no contraindication.  < end of copied text >    < from: TTE Echo Complete w/ Contrast w/ Doppler (12.31.20 @ 14:01) >  ummary:   1. Left ventricular ejection fraction, by visual estimation, is 60 to 65%.   2. Technically suboptimal study.   3. Normal global left ventricular systolic function.   4. Normal left ventricular internal cavity size.   5. Spectral Doppler shows pseudonormal pattern of left ventricular myocardial filling (Grade II diastolic dysfunction).   6. Normal right ventricular size and function.   7. Mild to moderately enlarged left atrium.   8. There is no evidence of pericardial effusion.   9. Mild mitral annular calcification.  10. Mild mitral valve regurgitation.  11. Mild thickening and calcification of the anterior and posterior mitral valve leaflets.  12. Peak transmitral mean gradient equals 6.8 mmHg, calculated mitral valve area by pressure half time equals 1.86 cm² consistent withmild mitral stenosis.  13. Mild tricuspid regurgitation.  14. Mild aortic regurgitation.  15. Sclerotic aortic valve with normal opening.  16. Estimated pulmonary artery systolic pressure is 68.4 mmHg assuming a right atrial pressure of 8 mmHg, whichis consistent with severe pulmonary hypertension.  17. C/w the study of 6-21-20, more significant pulmonary htn is now noted.  18. Endocardial visualization was enhanced with intravenous echo contrast.  19. Mitral valve mean gradient is 6.8 mmHg consistent with moderate mitral stenosis.  Montana Barreto MD FACC, FASE, FAC  < end of copied text >                	   70 years      h/o   CAD,  mechanical MVR and Tricuspid valve repair,        h/o  chronic   HFpEF,   HTN  . MIGUEL ,     severe MR  ,  s/p mechanical MVR 1999 , [facility] with Dr. Anderson,  and severe TR s/p TVR 2012 ([facility]),       COPD ,on home 02; no prior tobacco use      AFib on Coumadin , failed  ablation,   ,  PHTN,   CKD 3 (b/l SCr ~1.3-1.6),  MIGUEL and gout.     h/o   hypercarbia and she reports  needing Bipap,in the past,  but hasn't used in 10 years.      Morbid  obesity,  BMI is 41     presented with  CP,  dizziness and sob     She also had epistaxis at [facility],/         1.,  Atrial fibrillation       metoprolol ER 25 mg  / lipitor     2.  MVR  mechanical,1999  Continue IV heparin and warfarin with goal INR of 2.5-3.5 with mechanical MVR   daily INR   3.  TVR, 2012 at [facility]   4.  HTN, on Toprol   5. Dizziness , since resolved  Ct head , with infarcts, Right b. ganglia and Right cerebellum   6. .  Anemia,  has  c/c  anemia, , baseline is  8  to 9/   last colonoscopy  was  10 yrs  ago/ s/p melena last yr. and, no w/p was done.,as  pt was  deemed high risk  for  any procedure  7.  Acute  Diastolic  Chf    on  iv bumex bid       daily weights/ follow   I/O//   still  with sob on exertion  8.  Severe Pulm Htn, on echo  Echo  12/20,normal  ef/mod  MS/  severe Pulm Htn  per card dr mcnamara , pt   transferred to  [place]   for ELIZABETH/ ? fistula  from aorta  to RV/     s/p epistaxis  seen by ent  heart  failure team following pt / has lost 3  kg  ELIZABETH,  cancelled  per  floor  by echo dept/  spoke  with  chf   Dr alba from 1/8.  MVR/  decreased  RV function/ no pfo/vsd  on coumadin/ follow inr  pe  ENT, packing removal on 1/12  Mechanical MVR./  on coumadin  on exertion on 1/11, pt with mild  sob and hypoxia  in high  80's. bumex  increased  to 2 mg bid   INR in am/ and,  await  heart  failure team f/p   d/c only when INr is in therapeutic range and when cleared  by haert failure team       < from: Transthoracic Echocardiogram (01.08.21 @ 15:53) >  Conclusions:  1. Mechanical prosthetic mitral valve replacement. Peak  mitral valve gradient equals 15 mm Hg, mean transmitral  valve gradient equals 6 mm Hg, which is elevated even in  the setting of a mechanical prosthetic mitral valve  replacement (Heart rate 69 bpm).  2. Refer to prior echos for assesssment of LV function.  Septal motion consistent with cardiac surgery, right  ventricular overload.  3. Right atrial enlargement.  4. Right ventricular enlargement with decreased right  ventricular systolic function.  5. An annuloplasty ring is seen in the tricuspid position.  PG 3 mm hg, MG 1 mm hg. No tricuspid regurgitation.  6. Agitated saline injection demonstrates evidence of a  patent foramen ovale/VSD.  No color Doppler evidence of  < end of copied text >     < from: CT Head No Cont (12.29.20 @ 20:41)   IMPRESSION: No acute intracranial hemorrhage. Age-indeterminate right basal ganglia lacunar infarct and right cerebellar lacunar infarct. If there is clinical suspicion for acute infarct, consider brain MRI if there is no contraindication.  < end of copied text >    < from: TTE Echo Complete w/ Contrast w/ Doppler (12.31.20 @ 14:01) >  ummary:   1. Left ventricular ejection fraction, by visual estimation, is 60 to 65%.   2. Technically suboptimal study.   3. Normal global left ventricular systolic function.   4. Normal left ventricular internal cavity size.   5. Spectral Doppler shows pseudonormal pattern of left ventricular myocardial filling (Grade II diastolic dysfunction).   6. Normal right ventricular size and function.   7. Mild to moderately enlarged left atrium.   8. There is no evidence of pericardial effusion.   9. Mild mitral annular calcification.  10. Mild mitral valve regurgitation.  11. Mild thickening and calcification of the anterior and posterior mitral valve leaflets.  12. Peak transmitral mean gradient equals 6.8 mmHg, calculated mitral valve area by pressure half time equals 1.86 cm² consistent withmild mitral stenosis.  13. Mild tricuspid regurgitation.  14. Mild aortic regurgitation.  15. Sclerotic aortic valve with normal opening.  16. Estimated pulmonary artery systolic pressure is 68.4 mmHg assuming a right atrial pressure of 8 mmHg, whichis consistent with severe pulmonary hypertension.  17. C/w the study of 6-21-20, more significant pulmonary htn is now noted.  18. Endocardial visualization was enhanced with intravenous echo contrast.  19. Mitral valve mean gradient is 6.8 mmHg consistent with moderate mitral stenosis.  Montana Barreto MD FACC, FASE, FAC  < end of copied text >

## 2021-01-12 NOTE — DISCHARGE NOTE PROVIDER - PROVIDER TOKENS
FREE:[LAST:[[place]],FIRST:[[place]],PHONE:[(   )    -],FAX:[(   )    -],ADDRESS:[PCP]] FREE:[LAST:[[place]],FIRST:[[place]],PHONE:[(   )    -],FAX:[(   )    -],ADDRESS:[PCP]],PROVIDER:[TOKEN:[[id number]]] PROVIDER:[TOKEN:[[id number]]],PROVIDER:[TOKEN:[[id number]]] PROVIDER:[TOKEN:[[id number]]],PROVIDER:[TOKEN:[[id number]]],PROVIDER:[TOKEN:[[id number]]]

## 2021-01-12 NOTE — PROGRESS NOTE ADULT - SUBJECTIVE AND OBJECTIVE BOX
Subjective:  - Reportedly desatted yesterday while ambulating   - Currently denies any SOB at rest, lightheadedness, or dizziness    Medications:  allopurinol 100 milliGRAM(s) Oral daily  amoxicillin  500 milliGRAM(s)/clavulanate 1 Tablet(s) Oral every 12 hours  BACItracin   Ointment 1 Application(s) Topical two times a day  budesonide 160 MICROgram(s)/formoterol 4.5 MICROgram(s) Inhaler 2 Puff(s) Inhalation two times a day  buMETAnide 2 milliGRAM(s) Oral two times a day  metoprolol succinate ER 25 milliGRAM(s) Oral daily  simvastatin 10 milliGRAM(s) Oral at bedtime  sodium chloride 0.65% Nasal 1 Spray(s) Both Nostrils two times a day PRN  warfarin 7 milliGRAM(s) Oral once      ICU Vital Signs Last 24 Hrs  T(C): 36.9, Max: 37.1 ( @ 20:00)  HR: 59 (59 - 73)  BP: 114/65 (107/60 - 123/67)  BP(mean): --  ABP: --  ABP(mean): --  RR: 18 (18 - 18)  SpO2: 98% (97% - 99%)    Weight in k.5 (21)  Weight in k.9 (21)      I&O's Summary Last 24 Hrs    IN: 1020 mL / OUT: 2050 mL / NET: -1030 mL    Tele: Afib 60-70    Physical Exam:    General: No distress. Comfortable.  HEENT: EOM intact.  Neck: Neck supple. JVP mildly elevated. No masses  Chest: Clear to auscultation bilaterally  CV: Irregularly irregular. Normal S1 and S2. No murmurs, rub, or gallops. Radial pulses normal.  Abdomen: Soft, obese, non-distended, non-tender  Skin: No rashes or skin breakdown  Extremities: Warm, trace BLE edema  Neurology: Alert and oriented times three. Sensation intact  Psych: Affect normal    Labs:    ( 21 @ 07:11 )               8.7    6.46  )--------( 409                  30.3     ( 21 @ 07:11 )     137  |  94  |  55  ---------------------<  95  4.2  |  36  |  1.60    Ca 9.5  Phos x   Mg x       PTT/PT/INR - ( 21 @ 07:11 )  PTT: x     / PT: 27.2 sec / INR: 2.36 ratio    ( 20 @ 22:14 )  TropHS x     / CK 44    / CKMB x        Serum Pro-Brain Natriuretic Peptide: 1336 (20 @ 16:56)

## 2021-01-12 NOTE — DISCHARGE NOTE PROVIDER - HOSPITAL COURSE
70 years year-old female with PMH of CAD,  HFpEF (EF 65-60%), severe MR and TR s/p mechanical MVR and Tricuspid valve repair on coumadin, atrial fibrillation s/p failed ablation, HTN, HLD, PHTN, MIGUEL, COPD with hypercapnea (on 3 liters home O2 - previously on BIPAP > 10 years ago), CKD 3, gout, and morbid obesity (BMI 41), presented to OSH with CP, SOB, and dizziness.  CT head revealed age-indeterminate right basal ganglia lacunar infarct and right cerebellar lacunar infarct.  Patient and was found to be in decompensated HFpEF and tachy-ofelia at times - she was admitted and treated with IV diuretics.  TTE revealed preserved EF, Stage II diastolic dysfunction, mild-mod AS, and severe pulm HTN.  On review of TTE by [facility] Dr. Escobar, there was concern for possible L-R shunt, ?aortic valve to RV fistula.  Patient transferred to [facility] for ELIZABETH, escalation of care, and evaluation by HF team.  While here, patient continued to be diuresed.  Repeated echocardiogram revealed no echocardiographic evidence of intracardiac shunt, therefore ELIZABETH was cancelled.  Patient was transitioned to oral bumex, and dose was subsequently increased due to ambulatory hypoxia; CXR was concerning for increased pulmonary vascular congestion. Hospital course was notable for epistaxis requiring nasal packing, and PRBC transfusion while at OSH.   At [facility], patient was evaluated by ENT, and bleeding was controlled with cauterization and rapid rhino nasal packing in right nare; packing is now removed.  Patient was evaluated by GI 2/2 anemia and prior history melena, noted to have overt/brisk GI bleed.  Hgb has remained stable.  Patient remains on AC with coumadin for mechanical valve. 70 years year-old female with PMH of CAD,  HFpEF (EF 65-60%), severe MR and TR s/p mechanical MVR and Tricuspid valve repair on coumadin, atrial fibrillation s/p failed ablation, HTN, HLD, PHTN, MIGUEL, COPD with hypercapnea (on 3 liters home O2 - previously on BIPAP > 10 years ago), CKD 3, gout, and morbid obesity (BMI 41), presented to OSH with CP, SOB, and dizziness.  CT head revealed age-indeterminate right basal ganglia lacunar infarct and right cerebellar lacunar infarct.  Patient and was found to be in decompensated HFpEF and tachy-ofelia at times - she was admitted and treated with IV diuretics.  TTE revealed preserved EF, Stage II diastolic dysfunction, mild-mod AS, and severe pulm HTN.  On review of TTE by [facility] Dr. Escobar, there was concern for possible L-R shunt, ?aortic valve to RV fistula.  Patient transferred to [facility] for ELIZABETH, escalation of care, and evaluation by HF team.  While here, patient continued to be diuresed.  Repeated echocardiogram revealed no echocardiographic evidence of intracardiac shunt, therefore ELIZABETH was cancelled.  Patient was transitioned to oral bumex, and dose was subsequently increased due to ambulatory hypoxia; CXR was concerning for increased pulmonary vascular congestion................................................Hospital course was notable for epistaxis requiring nasal packing, and PRBC transfusion while at OSH.   At [facility], patient was evaluated by ENT, and bleeding was controlled with cauterization and rapid rhino nasal packing in right nare; packing is now removed.  Patient was evaluated by GI 2/2 anemia and prior history melena, noted to have overt/brisk GI bleed.  Hgb has remained stable.  Patient remains on AC with coumadin for mechanical valve. 70 years year-old female with PMH of CAD,  HFpEF (EF 65-60%), severe MR and TR s/p mechanical MVR and Tricuspid valve repair on coumadin, atrial fibrillation s/p failed ablation, HTN, HLD, PHTN, MIGUEL, COPD with hypercapnea (on 3 liters home O2 - previously on BIPAP > 10 years ago), CKD 3, gout, and morbid obesity (BMI 41), presented to OSH with CP, SOB, and dizziness.  CT head revealed age-indeterminate right basal ganglia lacunar infarct and right cerebellar lacunar infarct.  Patient and was found to be in decompensated HFpEF and tachy-ofelia at times - she was admitted and treated with IV diuretics.  TTE revealed preserved EF, Stage II diastolic dysfunction, mild-mod AS, and severe pulm HTN.  On review of TTE by [facility] Dr. Escobar, there was concern for possible L-R shunt, ?aortic valve to RV fistula.  Patient transferred to [facility] for ELIZABETH, escalation of care, and evaluation by HF team.  While here, patient continued to be diuresed.  Repeated echocardiogram revealed no echocardiographic evidence of intracardiac shunt, therefore ELIZABETH was cancelled.  Patient was transitioned to oral bumex, and dose was subsequently increased due to ambulatory hypoxia; CXR was concerning for increased pulmonary vascular congestion................................................Hospital course was notable for epistaxis requiring nasal packing, and PRBC transfusion while at OSH.   At [facility], patient was evaluated by ENT, and bleeding was controlled with cauterization and rapid rhino nasal packing in right nare; packing is now removed.  Patient was evaluated by GI 2/2 anemia and prior history melena, noted to have overt/brisk GI bleed.  Hgb has remained stable.  Patient remains on AC with coumadin for mechanical valve pulmology  to  help with   nocturnal  Awaps  trilogy/ MIGUEL  discharge planning  ,pending  procerement  of trilogy,    f/p  with pmd/ dr gomez/  check inr,  next week   note, awaiting insurance approval for Trilogy, next week             70 years year-old female with PMH of CAD,  HFpEF (EF 65-60%), severe MR and TR s/p mechanical MVR and Tricuspid valve repair on coumadin, atrial fibrillation s/p failed ablation, HTN, HLD, PHTN, MIGUEL, COPD with hypercapnea (on 3 liters home O2 - previously on BIPAP > 10 years ago), CKD 3, gout, and morbid obesity (BMI 41), presented to OSH with CP, SOB, and dizziness.  CT head revealed age-indeterminate right basal ganglia lacunar infarct and right cerebellar lacunar infarct.  Patient and was found to be in decompensated HFpEF and tachy-ofelia at times - she was admitted and treated with IV diuretics.  TTE revealed preserved EF, Stage II diastolic dysfunction, mild-mod AS, and severe pulm HTN.  On review of TTE by [facility] Dr. Escobar, there was concern for possible L-R shunt, ?aortic valve to RV fistula.  Patient transferred to [facility] for ELIZABETH, escalation of care, and evaluation by HF team.  While here, patient continued to be diuresed.  Repeated echocardiogram revealed no echocardiographic evidence of intracardiac shunt, therefore ELIZABETH was cancelled.  Patient was transitioned to oral bumex, and dose was subsequently increased due to ambulatory hypoxia; CXR was concerning for increased pulmonary vascular congestion - patient was diuresed further and PO diuretics were uptitrated per HF Team.  Hospital course was notable for epistaxis requiring nasal packing, and PRBC transfusion while at OSH.   At [facility], patient was evaluated by ENT, and bleeding was controlled with cauterization and rapid rhino nasal packing in right nare; packing is now removed.  Patient was evaluated by GI 2/2 anemia and prior history melena, noted to have overt/brisk GI bleed.  Hgb has remained stable.  Patient remains on AC with coumadin for mechanical valve.  Patient was evaluated by Pulmonary, recommended for nocturnal NiPPV for MIGUEL -  Trilogy facilitated by CM.  Scr noted to uptrend, recommended by HF to take Bumex 2mg daily, with second dose only if she gains weight.  Patient to follow up with PMD in 5 days for INR check and exam, she has appointment on 1/25 with the Heart Failure team and will have repeat labs at that visit.           70 years year-old female with PMH of CAD,  HFpEF (EF 65-60%), severe MR and TR s/p mechanical MVR and Tricuspid valve repair on coumadin, atrial fibrillation s/p failed ablation, HTN, HLD, PHTN, MIGUEL, COPD with hypercapnea (on 3 liters home O2 - previously on BIPAP > 10 years ago), CKD 3, gout, and morbid obesity (BMI 41), presented to OSH with CP, SOB, and dizziness.  CT head revealed age-indeterminate right basal ganglia lacunar infarct and right cerebellar lacunar infarct.  Patient and was found to be in decompensated HFpEF and tachy-ofelia at times - she was admitted and treated with IV diuretics.  TTE revealed preserved EF, Stage II diastolic dysfunction, mild-mod AS, and severe pulm HTN.  On review of TTE by [facility] Dr. Escobar, there was concern for possible L-R shunt, ?aortic valve to RV fistula.  Patient transferred to [facility] for ELIZABETH, escalation of care, and evaluation by HF team.  While here, patient continued to be diuresed.  Repeated echocardiogram revealed no echocardiographic evidence of intracardiac shunt, therefore ELIZABETH was cancelled.  Patient was transitioned to oral bumex, and dose was subsequently increased due to ambulatory hypoxia; CXR was concerning for increased pulmonary vascular congestion - patient was diuresed further and PO diuretics were uptitrated per HF Team.  Hospital course was notable for epistaxis requiring nasal packing, and PRBC transfusion while at OSH.   At [facility], patient was evaluated by ENT, and bleeding was controlled with cauterization and rapid rhino nasal packing in right nare; packing is now removed.  Patient was evaluated by GI 2/2 anemia and prior history melena, noted to have overt/brisk GI bleed.  Hgb has remained stable.  Patient remains on AC with coumadin for mechanical valve.  Patient was evaluated by Pulmonary, recommended for nocturnal NiPPV for MIGUEL -  Trilogy facilitated by CM.  Scr noted to uptrend, recommended by HF to take Bumex 2mg daily, with second dose only if she gains weight.  Patient to follow up with PMD in 5 days for INR check and exam, she has appointment on 1/25 with the Heart Failure team and will have repeat BMP at that visit.           70 years year-old female with PMH of CAD,  HFpEF (EF 65-60%), severe MR and TR s/p mechanical MVR and Tricuspid valve repair on coumadin, atrial fibrillation s/p failed ablation, HTN, HLD, PHTN, MIGUEL, COPD with hypercapnea (on 3 liters home O2 - previously on BIPAP > 10 years ago), CKD 3, gout, and morbid obesity (BMI 41), presented to OSH with CP, SOB, and dizziness.  CT head revealed age-indeterminate right basal ganglia lacunar infarct and right cerebellar lacunar infarct.  Patient and was found to be in decompensated HFpEF and tachy-ofelia at times - she was admitted and treated with IV diuretics.  TTE revealed preserved EF, Stage II diastolic dysfunction, mild-mod AS, and severe pulm HTN.  On review of TTE by [facility] Dr. Escobar, there was concern for possible L-R shunt, ?aortic valve to RV fistula.  Patient transferred to [facility] for ELIZABETH, escalation of care, and evaluation by HF team.  While here, patient continued to be diuresed.  Repeated echocardiogram revealed no echocardiographic evidence of intracardiac shunt, therefore ELIZABETH was cancelled.  Patient was transitioned to oral bumex, and dose was subsequently increased due to ambulatory hypoxia; CXR was concerning for increased pulmonary vascular congestion - patient was diuresed further and PO diuretics were uptitrated per HF Team.  Hospital course was notable for epistaxis requiring nasal packing, and PRBC transfusion while at OSH.   At [facility], patient was evaluated by ENT, and bleeding was controlled with cauterization and rapid rhino nasal packing in right nare; packing is now removed.  Patient was evaluated by GI 2/2 anemia and prior history melena, noted to have overt/brisk GI bleed.  Hgb has remained stable.  Patient remains on AC with coumadin for mechanical valve.  Patient was evaluated by Pulmonary, recommended for nocturnal NiPPV for MIGUEL -  Trilogy facilitated by CM.  Scr noted to uptrend, recommended by HF to take Bumex 2mg daily, with second dose only if she gains weight.  Patient to follow up with PMD in 5 days for INR check and exam, she has appointment on 1/25 with the Heart Failure team and will have repeat BMP at that visit.  During course, patient was exposed to Covid-19 (1/12/2020), she was isolated, demonstrated no infectious symptoms, patient will continue quarantine upon discharge.

## 2021-01-12 NOTE — PROGRESS NOTE ADULT - PROBLEM SELECTOR PLAN 1
- Strict blood pressure control.   - Nasal saline, 2 sprays to both nares 4 times a day  - Avoid nasal trauma; no nose rubbing, blowing or manipulating nasal packing.  Sneeze with mouth open and pinching nares.  - Avoid bending with head blow the waist.    -No heavy lifting.

## 2021-01-12 NOTE — PROGRESS NOTE ADULT - ATTENDING COMMENTS
Francois Harrison MD, FACP, FACG, AGAF  [facility]  [phone]     After hours and weekend coverage GI service : [phone]
I agree with above assessment and plan.    James Bradley 
anemia, hgb stable, increase anesthesia risk conservative management    plan; ppi daily         montior hgb
Decompensated CHF, sob, anemia,  and epistaxsis, with brown stool    plan: continue management as above           conservative management of anemia given respiratory status           ppi daily
69 y/o female admitted with acute on chronic HFpEF/stage C/NYHA III.  We saw her upon admission, her symptoms improved significantly after aggressive diuresis. We have been asked to see her again due to recurrence of acute CHF and pulmonary congestion.   Her diuretics were uptitrated, will assess response.   We have discussed again the benefits from CardioM[employer], she will think about it. Will also benefit from risk factors modification: CPAP for MIGUEL, weight loss and increase physical activity.   Even though she has multiple comorbidities, I believe her symptoms are predominantly due to HFpEF. She had a remarkable improvement after diuresis. Will review outside records regarding COPD.
Significant clinical improvement with IV diuresis. Will transition to oral regimen.   Considered high risk for anesthesia/ELIZABETH. Underwent thorough transthoracic assessment including bubble study. No evidence of intracardiac shunt.   No need for further testing at this point.   DC planning.
Clinically euvolemic.   OK to switch to PO bumex 2mg PO BID.  OK to DC home tomorrow from HF standpoint.   No echocardiographic evidence of intracardiac shunt.  Continue follow up at [place] with Dr. Veronica Mac.
Clinically seems better, pt reports improvement of VILLA.   Getting close to euvolemia, may have to downtitrate diuretics tomorrow.   Pending ELIZABETH to assess ?intracardiac shunt and mechanical MV.   Will benefit from cardioMEMs, may not be a candidate due to chest circumference

## 2021-01-12 NOTE — DISCHARGE NOTE PROVIDER - NSFOLLOWUPCLINICS_GEN_ALL_ED_FT
[facility]  Cardiology  [address] [place] [address]  Phone: [phone]  Fax:   Follow Up Time:

## 2021-01-12 NOTE — PROGRESS NOTE ADULT - ASSESSMENT
69 yo female with a PMHx of CAD, HFpEF (EF 65-60%), severe MR s/p mechanical MVR 1999 (Dr. Anderson, [facility]), severe TR s/p mechanical TVR ([facility]), afib s/p ablation, HTN, HLD, CKD (bl 1.35-1.5), COPD on 3L NC at home, MIGUEL who [facility], sob, dizziness x 1 week, found to be in decompensated HFpEF, w/ tachy-ofelia rhythm. Seen by Dr. Thrasher Cardiology at [facility]. s/p IV diuresis w/ lasix BID. TTE 12/31 reviewed by [facility] Dr. Escobar, c/f possible L-R shunt, ?fistula between aortic valve and RV. Patient transferred to [facility] for ELIZABETH and escalation of care. There was no echocardiographic evidence of intracardiac shunt. Yesterday patient was noted to desaturate down to 86% while ambulating and her CXR was concerning for increased pulmonary vascular congestion so HF was called back to evaluate volume status. She currently appears mildly volume overloaded on exam and her renal function is overall stable.    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

## 2021-01-12 NOTE — DISCHARGE NOTE PROVIDER - NSDCHHNEEDSERVICEOTHER_GEN_ALL_CORE_FT
pulm dr fernandez, burak/  MIGUEL  pulm burak price/  MIGUEL.    has TRilogy    pmd  dr ILSA DYKES/  rojas BEAR

## 2021-01-12 NOTE — DISCHARGE NOTE PROVIDER - NSDCFUADDINST_GEN_ALL_CORE_FT
- Follow up with your Primary Care Doctor/Cardiologist within 1 week - ask your doctor to check your INR at this visit.  - Keep appointment in the Heart Failure Clinic at [facility] on 01/25/2021 at 0830am - repeat labs (BMP) will be done at this visit.  - Follow up with Dr. Eaton within 1-2 weeks for Pulmonary Function Tests. - Follow up with your Primary Care Doctor/Cardiologist within 1 week - ask your doctor to check your INR at this visit.  - Keep appointment in the Heart Failure Clinic at [facility] on 01/25/2021 at 0830am - repeat labs (BMP) will be done at this visit.  - Follow up with Dr. Eaton within 1-2 weeks for Pulmonary Function Tests.  - Follow up with ENT next week. - Continue self quarantine until 1/26/2020 due to COVID-19 exposure.  - Follow up with your Primary Care Doctor/Cardiologist within 1 week - ask your doctor to check your INR at this visit.  - Keep appointment in the Heart Failure Clinic at [facility] on 01/25/2021 at 0830am - repeat labs (BMP) will be done at this visit.  - Follow up with Dr. Eaton within 1-2 weeks for Pulmonary Function Tests.  - Follow up with ENT next week. - Continue self quarantine until 1/26/2020 due to COVID-19 exposure.  - Follow up with your Primary Care Doctor/Cardiologist within 1 week - ask your doctor to check your INR at this visit.  - Keep appointment in the Heart Failure Clinic at [facility] on 01/25/2021 at 0830am - repeat labs (BMP) will be done at this visit.  - Heart failure if your weight increases 2 lbs in 2 days or 5 lbs in a week.  You may need to take an extra dose of bumex if that happens.  - Follow up with Dr. Eaton within 1-2 weeks for Pulmonary Function Tests.  - Follow up with ENT next week.

## 2021-01-12 NOTE — DISCHARGE NOTE PROVIDER - CARE PROVIDER_API CALL
Nigel Lee  PCP  Phone: (   )    -  Fax: (   )    -  Follow Up Time:    Nigel Lee  PCP  Phone: (   )    -  Fax: (   )    -  Follow Up Time:     Moose Eaton (DO)  Internal Medicine; Pulmonary Disease; Sleep Medicine  [address], [address]  [place] [address]  Phone: [phone]  Fax: [phone]  Follow Up Time:    Moose Eaton (DO)  Internal Medicine; Pulmonary Disease; Sleep Medicine  [address], [address]  [place] [address]  Phone: [phone]  Fax: [phone]  Follow Up Time:     Gerald Manning  CARDIOVASCULAR DISEASE  [address] [place] [address]  Phone: [phone]  Fax: [phone]  Follow Up Time:    Moose Eaton (DO)  Internal Medicine; Pulmonary Disease; Sleep Medicine  [address]  [place] [address]  Phone: [phone]  Fax: [phone]  Follow Up Time:     Gerald Manning  CARDIOVASCULAR DISEASE  [address] [place] [address]  Phone: [phone]  Fax: [phone]  Follow Up Time:     Nilam Pineda)  Otolaryngology  [address] [place] [address] [address]  [place] [address]  Phone: [phone]  Fax: [phone]  Follow Up Time:

## 2021-01-12 NOTE — DISCHARGE NOTE PROVIDER - NSDCCPCAREPLAN_GEN_ALL_CORE_FT
PRINCIPAL DISCHARGE DIAGNOSIS  Diagnosis: Heart failure with preserved ejection fraction  Assessment and Plan of Treatment: Weigh yourself daily.  If you gain 3lbs in 3 days, or 5lbs in a week call your Health Care Provider.  Do not eat or drink foods containing more than 2000mg of salt (sodium) in your diet every day.  Call your Health Care Provider if you have any swelling or increased swelling in your feet, ankles, and/or stomach.  Take all of your medication as directed.  If you become dizzy call your Health Care Provider.        SECONDARY DISCHARGE DIAGNOSES  Diagnosis: Epistaxis  Assessment and Plan of Treatment: Now resolved   s/p Nasal packing   abx while Nasal packing in    Diagnosis: Chronic atrial fibrillation  Assessment and Plan of Treatment: Atrial fibrillation is the most common heart rhythm problem & has the risk of stroke & heart attack  It helps if you control your blood pressure, not drink more than 1-2 alcohol drinks per day, cut down on caffeine, getting treatment for over active thyroid gland, & getting exercise  Call your doctor if you feel your heart racing or beating unusually, chest tightness or pain, lightheaded, faint, shortness of breath especially with exercise  It is important to take your heart medication as prescribed  You may be on anticoagulation which is very important to take as directed - you may need blood work to monitor drug levels      Diagnosis: Chronic kidney disease (CKD)  Assessment and Plan of Treatment: Avoid taking (NSAIDs) - (ex: Ibuprofen, Advil, Celebrex, Naprosyn)  Avoid taking any nephrotoxic agents (can harm kidneys) - Intravenous contrast for diagnostic testing, combination cold medications.  Have all medications adjusted for your renal function by your Health Care Provider.  Blood pressure control is important.  Take all medication as prescribed.       PRINCIPAL DISCHARGE DIAGNOSIS  Diagnosis: Heart failure with preserved ejection fraction  Assessment and Plan of Treatment: Take all medications as prescribed.  Stop smoking if you currently smoke, and avoid high altitudes.  Weigh yourself daily.  If you gain 2lbs in 2 days, or 5lbs in a week call your Health Care Provider.  Eat a low sodium diet.  If you have pulmonary hypertension and you are a female of child bearing age, talk to your caregiver about using birth control pills or getting pregnant.  Call your Health Care Provider if you have any swelling or increased swelling in your feet, ankles, and/or stomach.  If you experience dizziness, chest pain, or shortness of breath, seek immediate medical attention.      SECONDARY DISCHARGE DIAGNOSES  Diagnosis: Epistaxis  Assessment and Plan of Treatment: Now resolved.  Continue antibiotics as prescribed.  -  Avoid nasal trauma; no nose rubbing, blowing or manipulating nasal packing.    -  Sneeze with mouth open and pinching nares.  -  Avoid bending with head blow the waist.    -  No heavy lifting.    Diagnosis: Chronic atrial fibrillation  Assessment and Plan of Treatment: Atrial fibrillation is a common heart rhythm problem which increases the risk of stroke and heat attack.  It helps if you control your blood pressure, avoid alcohol, cut down on caffeine, get treatment for your thyroid if it is overactive, and perform moderate exercise in consultation with your Primary Care Provider.  Call your doctor if you experience chest tightness/pain, lightheadedness, loss of consciousness, shortness of breath (especially with exercise), feel your heart racing or beating unusually, frequent or abnormal bleeding.  It is important to take all your heart medications as prescribed.    Diagnosis: Chronic kidney disease (CKD)  Assessment and Plan of Treatment: Avoid taking NSAIDs (ex: Ibuprofen, Advil, Celebrex, Naprosyn) and other agents that can harm the kidneys such as intravenous contrast for diagnostic testing, combination cold medications, etc. until you are instructed to do so by your Primary Care Physician.  Have all of your medications adjusted for your renal function by your Health Care Provider.  Blood pressure control is important.  Take all medication as prescribed.  Do not overconsume foods that are high in potassium, such as bananas, until you are instructed to do so by your primary care physician.     PRINCIPAL DISCHARGE DIAGNOSIS  Diagnosis: Heart failure with preserved ejection fraction  Assessment and Plan of Treatment: Take all medications as prescribed.  Stop smoking if you currently smoke, and avoid high altitudes.  Weigh yourself daily.  If you gain 2lbs in 2 days, or 5lbs in a week call your Health Care Provider.  Eat a low sodium diet.  If you have pulmonary hypertension and you are a female of child bearing age, talk to your caregiver about using birth control pills or getting pregnant.  Call your Health Care Provider if you have any swelling or increased swelling in your feet, ankles, and/or stomach.  If you experience dizziness, chest pain, or shortness of breath, seek immediate medical attention.      SECONDARY DISCHARGE DIAGNOSES  Diagnosis: Epistaxis  Assessment and Plan of Treatment: Now resolved.  Continue antibiotics as prescribed.  -  Avoid nasal trauma; no nose rubbing, blowing or manipulating nasal packing.    -  Sneeze with mouth open and pinching nares.  -  Avoid bending with head blow the waist.    -  No heavy lifting.    Diagnosis: History of exposure  Assessment and Plan of Treatment: You were exposed to COVID 19.  You have no infectious symptoms.  However, please restrict activities outside of your home except for getting medical care.  Do not go to work, school, or public areas.  Avoid using public transportation, ride-sharing, or taxis.  Separate yourself from other people and animals in your home.  Call ahead before visiting your doctor.  Wear a facemask when you are around other people. Cover your cough and sneezes.  Clean your hands often.  Avoid sharing personal household items.  Clean all frequently touched surfaces daily.    Diagnosis: Chronic atrial fibrillation  Assessment and Plan of Treatment: Atrial fibrillation is a common heart rhythm problem which increases the risk of stroke and heat attack.  It helps if you control your blood pressure, avoid alcohol, cut down on caffeine, get treatment for your thyroid if it is overactive, and perform moderate exercise in consultation with your Primary Care Provider.  Call your doctor if you experience chest tightness/pain, lightheadedness, loss of consciousness, shortness of breath (especially with exercise), feel your heart racing or beating unusually, frequent or abnormal bleeding.  It is important to take all your heart medications as prescribed.    Diagnosis: Chronic kidney disease (CKD)  Assessment and Plan of Treatment: Avoid taking NSAIDs (ex: Ibuprofen, Advil, Celebrex, Naprosyn) and other agents that can harm the kidneys such as intravenous contrast for diagnostic testing, combination cold medications, etc. until you are instructed to do so by your Primary Care Physician.  Have all of your medications adjusted for your renal function by your Health Care Provider.  Blood pressure control is important.  Take all medication as prescribed.  Do not overconsume foods that are high in potassium, such as bananas, until you are instructed to do so by your primary care physician.     PRINCIPAL DISCHARGE DIAGNOSIS  Diagnosis: Heart failure with preserved ejection fraction  Assessment and Plan of Treatment: Take all medications as prescribed.  Stop smoking if you currently smoke, and avoid high altitudes.  Weigh yourself daily.  If you gain 2lbs in 2 days, or 5lbs in a week call your Health Care Provider.  Eat a low sodium diet.  If you have pulmonary hypertension and you are a female of child bearing age, talk to your caregiver about using birth control pills or getting pregnant.  Call your Health Care Provider if you have any swelling or increased swelling in your feet, ankles, and/or stomach.  If you experience dizziness, chest pain, or shortness of breath, seek immediate medical attention.      SECONDARY DISCHARGE DIAGNOSES  Diagnosis: Epistaxis  Assessment and Plan of Treatment: Now resolved.  Continue antibiotics as prescribed.  -  Avoid nasal trauma; no nose rubbing, blowing or manipulating nasal packing.    -  Sneeze with mouth open and pinching nares.  -  Avoid bending with head blow the waist.    -  No heavy lifting.    Diagnosis: History of exposure  Assessment and Plan of Treatment: You were exposed to COVID 19.  You have no infectious symptoms.  However, please restrict activities outside of your home except for getting medical care.  Do not go to work, school, or public areas.  Avoid using public transportation, ride-sharing, or taxis.  Separate yourself from other people and animals in your home.  Call ahead before visiting your doctor.  Wear a facemask when you are around other people. Cover your cough and sneezes.  Clean your hands often.  Avoid sharing personal household items.  Clean all frequently touched surfaces daily.  Notify your doctor if you start to have COVID-19 symptoms.    Diagnosis: Chronic atrial fibrillation  Assessment and Plan of Treatment: Atrial fibrillation is a common heart rhythm problem which increases the risk of stroke and heat attack.  It helps if you control your blood pressure, avoid alcohol, cut down on caffeine, get treatment for your thyroid if it is overactive, and perform moderate exercise in consultation with your Primary Care Provider.  Call your doctor if you experience chest tightness/pain, lightheadedness, loss of consciousness, shortness of breath (especially with exercise), feel your heart racing or beating unusually, frequent or abnormal bleeding.  It is important to take all your heart medications as prescribed.    Diagnosis: Chronic kidney disease (CKD)  Assessment and Plan of Treatment: Avoid taking NSAIDs (ex: Ibuprofen, Advil, Celebrex, Naprosyn) and other agents that can harm the kidneys such as intravenous contrast for diagnostic testing, combination cold medications, etc. until you are instructed to do so by your Primary Care Physician.  Have all of your medications adjusted for your renal function by your Health Care Provider.  Blood pressure control is important.  Take all medication as prescribed.  Do not overconsume foods that are high in potassium, such as bananas, until you are instructed to do so by your primary care physician.     PRINCIPAL DISCHARGE DIAGNOSIS  Diagnosis: Heart failure with preserved ejection fraction  Assessment and Plan of Treatment: Take all medications as prescribed.  Stop smoking if you currently smoke, and avoid high altitudes.  Weigh yourself daily.  If you gain 2lbs in 2 days, or 5lbs in a week call your Health Care Provider.  Eat a low sodium diet.  If you have pulmonary hypertension and you are a female of child bearing age, talk to your caregiver about using birth control pills or getting pregnant.  Call your Health Care Provider if you have any swelling or increased swelling in your feet, ankles, and/or stomach.  If you experience dizziness, chest pain, or shortness of breath, seek immediate medical attention.      SECONDARY DISCHARGE DIAGNOSES  Diagnosis: Epistaxis  Assessment and Plan of Treatment: Now resolved.  Continue antibiotics as prescribed.  -  Avoid nasal trauma; no nose rubbing, blowing or manipulating nasal packing.    -  Sneeze with mouth open and pinching nares.  -  Avoid bending with head blow the waist.    -  No heavy lifting.    Diagnosis: MIGUEL on CPAP  Assessment and Plan of Treatment: Machine will be delivered and education will be given today.  Utilize machine as directed.    Diagnosis: History of exposure  Assessment and Plan of Treatment: You were exposed to COVID 19.  You have no infectious symptoms.  However, please restrict activities outside of your home except for getting medical care.  Do not go to work, school, or public areas.  Avoid using public transportation, ride-sharing, or taxis.  Separate yourself from other people and animals in your home.  Call ahead before visiting your doctor.  Wear a facemask when you are around other people. Cover your cough and sneezes.  Clean your hands often.  Avoid sharing personal household items.  Clean all frequently touched surfaces daily.  Notify your doctor if you start to have COVID-19 symptoms.    Diagnosis: Chronic atrial fibrillation  Assessment and Plan of Treatment: Atrial fibrillation is a common heart rhythm problem which increases the risk of stroke and heat attack.  It helps if you control your blood pressure, avoid alcohol, cut down on caffeine, get treatment for your thyroid if it is overactive, and perform moderate exercise in consultation with your Primary Care Provider.  Call your doctor if you experience chest tightness/pain, lightheadedness, loss of consciousness, shortness of breath (especially with exercise), feel your heart racing or beating unusually, frequent or abnormal bleeding.  It is important to take all your heart medications as prescribed.    Diagnosis: Chronic kidney disease (CKD)  Assessment and Plan of Treatment: Avoid taking NSAIDs (ex: Ibuprofen, Advil, Celebrex, Naprosyn) and other agents that can harm the kidneys such as intravenous contrast for diagnostic testing, combination cold medications, etc. until you are instructed to do so by your Primary Care Physician.  Have all of your medications adjusted for your renal function by your Health Care Provider.  Blood pressure control is important.  Take all medication as prescribed.  Do not overconsume foods that are high in potassium, such as bananas, until you are instructed to do so by your primary care physician.

## 2021-01-12 NOTE — PROGRESS NOTE ADULT - PROBLEM SELECTOR PLAN 2
- s/p mechanical TVR 2012 at [facility]  - Severe functional TR on TTE 12/31, no TR noted on ELIZABETH on 1/8  - c/w daily coumadin dosing per primary team

## 2021-01-12 NOTE — PROGRESS NOTE ADULT - PROBLEM SELECTOR PLAN 1
- c/w increased dose of Bumex 2mg PO BID  - Fluid restrict 1.5 L, daily standing weights   - Strict Is and Os, consider external female catheter for more accurate output recording  - TTE (1/8) showing PFO or VSD with agitated saline, no color doppler signals suggestive of VSD  - CardioMEMS was discussed with patient in order to help prevent further HF hospitalizations, which she is considering

## 2021-01-13 LAB
ANION GAP SERPL CALC-SCNC: 9 MMOL/L — SIGNIFICANT CHANGE UP (ref 5–17)
BUN SERPL-MCNC: 63 MG/DL — HIGH (ref 7–23)
CALCIUM SERPL-MCNC: 9.3 MG/DL — SIGNIFICANT CHANGE UP (ref 8.4–10.5)
CHLORIDE SERPL-SCNC: 95 MMOL/L — LOW (ref 96–108)
CO2 SERPL-SCNC: 32 MMOL/L — HIGH (ref 22–31)
CREAT SERPL-MCNC: 1.57 MG/DL — HIGH (ref 0.5–1.3)
GLUCOSE SERPL-MCNC: 96 MG/DL — SIGNIFICANT CHANGE UP (ref 70–99)
INR BLD: 2.2 RATIO — HIGH (ref 0.88–1.16)
POTASSIUM SERPL-MCNC: 4.3 MMOL/L — SIGNIFICANT CHANGE UP (ref 3.5–5.3)
POTASSIUM SERPL-SCNC: 4.3 MMOL/L — SIGNIFICANT CHANGE UP (ref 3.5–5.3)
PROTHROM AB SERPL-ACNC: 25.4 SEC — HIGH (ref 10.6–13.6)
SODIUM SERPL-SCNC: 136 MMOL/L — SIGNIFICANT CHANGE UP (ref 135–145)

## 2021-01-13 PROCEDURE — 99223 1ST HOSP IP/OBS HIGH 75: CPT

## 2021-01-13 PROCEDURE — 99233 SBSQ HOSP IP/OBS HIGH 50: CPT

## 2021-01-13 RX ORDER — WARFARIN SODIUM 2.5 MG/1
7.5 TABLET ORAL ONCE
Refills: 0 | Status: COMPLETED | OUTPATIENT
Start: 2021-01-13 | End: 2021-01-13

## 2021-01-13 RX ADMIN — BUMETANIDE 2 MILLIGRAM(S): 0.25 INJECTION INTRAMUSCULAR; INTRAVENOUS at 16:25

## 2021-01-13 RX ADMIN — Medication 1 APPLICATION(S): at 16:25

## 2021-01-13 RX ADMIN — WARFARIN SODIUM 7.5 MILLIGRAM(S): 2.5 TABLET ORAL at 21:15

## 2021-01-13 RX ADMIN — SIMVASTATIN 10 MILLIGRAM(S): 20 TABLET, FILM COATED ORAL at 21:15

## 2021-01-13 RX ADMIN — Medication 100 MILLIGRAM(S): at 16:25

## 2021-01-13 RX ADMIN — Medication 1 APPLICATION(S): at 06:04

## 2021-01-13 RX ADMIN — BUDESONIDE AND FORMOTEROL FUMARATE DIHYDRATE 2 PUFF(S): 160; 4.5 AEROSOL RESPIRATORY (INHALATION) at 06:04

## 2021-01-13 RX ADMIN — BUDESONIDE AND FORMOTEROL FUMARATE DIHYDRATE 2 PUFF(S): 160; 4.5 AEROSOL RESPIRATORY (INHALATION) at 16:25

## 2021-01-13 RX ADMIN — BUMETANIDE 2 MILLIGRAM(S): 0.25 INJECTION INTRAMUSCULAR; INTRAVENOUS at 06:04

## 2021-01-13 RX ADMIN — Medication 25 MILLIGRAM(S): at 06:04

## 2021-01-13 NOTE — PROGRESS NOTE ADULT - SUBJECTIVE AND OBJECTIVE BOX
Subjective:  -NAEO  -feels well  -denies cp, palpitations, dizziness, lightheadedness      Medications:  allopurinol 100 milliGRAM(s) Oral daily  BACItracin   Ointment 1 Application(s) Topical two times a day  budesonide 160 MICROgram(s)/formoterol 4.5 MICROgram(s) Inhaler 2 Puff(s) Inhalation two times a day  buMETAnide 2 milliGRAM(s) Oral two times a day  metoprolol succinate ER 25 milliGRAM(s) Oral daily  simvastatin 10 milliGRAM(s) Oral at bedtime  sodium chloride 0.65% Nasal 1 Spray(s) Both Nostrils two times a day PRN  warfarin 7.5 milliGRAM(s) Oral once      ICU Vital Signs Last 24 Hrs  T(C): 36.5, Max: 36.8 ( @ 04:21)  HR: 61 (61 - 64)  BP: 127/68 (112/75 - 137/73)  BP(mean): --  ABP: --  ABP(mean): --  RR: 18 (18 - 18)  SpO2: 100% (97% - 100%)    Weight in k.2 (21)  Weight in k.5 (21)      I&O's Summary Last 24 Hrs    IN: 900 mL / OUT: 1850 mL / NET: -950 mL      Tele: atrial fibrillation 50s-60s, brief PAT upto 168 overnight    Physical Exam:    General: sitting up in bed, in no distress  HEENT: EOMs intact.  Neck: Neck supple. JVP mildly elevated. No masses  Chest: Clear to auscultation bilaterally  CV: Irregularly irregular, Normal S1 and S2. No murmurs, rub, or gallops. Pulses full and equal in all extremities. Trace B/L LE edema.  Abdomen: Soft, non-distended, non-tender  Skin: No rashes or skin breakdown  Neurology: Alert and oriented times three. Sensation intact  Psych: Affect normal    Labs:    ( 21 @ 07:11 )               8.7    6.46  )--------( 409                  30.3     ( 21 @ 05:45 )     136  |  95  |  63  ---------------------<  96  4.3  |  32  |  1.57    Ca 9.3  Phos x   Mg x       PTT/PT/INR - ( 21 @ 05:45 )  PTT: x     / PT: 25.4 sec / INR: 2.20 ratio    ( 20 @ 22:14 )  TropHS x     / CK 44    / CKMB x        Serum Pro-Brain Natriuretic Peptide: 1336 (20 @ 16:56)

## 2021-01-13 NOTE — PROGRESS NOTE ADULT - PROBLEM SELECTOR PLAN 3
- s/p mechanical MVR 1999 w/ Dr. Anderson at [facility], no revisions   - min MR TTE 12/31

## 2021-01-13 NOTE — PROGRESS NOTE ADULT - PROBLEM SELECTOR PLAN 2
- s/p mechanical TVR 2012 at [facility]  - Severe functional TR on TTE 12/31, no TR noted on ELIZABETH on 1/8  - c/w daily coumadin dosing per primary team

## 2021-01-13 NOTE — PROGRESS NOTE ADULT - SUBJECTIVE AND OBJECTIVE BOX
feels  fatigyed    REVIEW OF SYSTEMS:  GEN: no fever,    no chills  RESP: no SOB,   no cough  CVS: no chest pain,   no palpitations  GI: no abdominal pain,   no nausea,   no vomiting,   no constipation,   no diarrhea  : no dysuria,   no frequency  NEURO: no headache,   no dizziness  PSYCH: no depression,   not anxious  Derm : no rash    MEDICATIONS  (STANDING):  allopurinol 100 milliGRAM(s) Oral daily  BACItracin   Ointment 1 Application(s) Topical two times a day  budesonide 160 MICROgram(s)/formoterol 4.5 MICROgram(s) Inhaler 2 Puff(s) Inhalation two times a day  buMETAnide 2 milliGRAM(s) Oral two times a day  metoprolol succinate ER 25 milliGRAM(s) Oral daily  simvastatin 10 milliGRAM(s) Oral at bedtime  warfarin 7.5 milliGRAM(s) Oral once    MEDICATIONS  (PRN):  sodium chloride 0.65% Nasal 1 Spray(s) Both Nostrils two times a day PRN Nasal Congestion      Vital Signs Last 24 Hrs  T(C): 36.8 (13 Jan 2021 04:21), Max: 36.9 (12 Jan 2021 11:51)  T(F): 98.3 (13 Jan 2021 04:21), Max: 98.5 (12 Jan 2021 11:51)  HR: 64 (13 Jan 2021 04:21) (59 - 64)  BP: 112/75 (13 Jan 2021 04:21) (112/75 - 137/73)  BP(mean): --  RR: 18 (13 Jan 2021 04:21) (18 - 18)  SpO2: 98% (13 Jan 2021 04:21) (97% - 98%)  CAPILLARY BLOOD GLUCOSE        I&O's Summary    12 Jan 2021 07:01  -  13 Jan 2021 07:00  --------------------------------------------------------  IN: 780 mL / OUT: 1850 mL / NET: -1070 mL        PHYSICAL EXAM:  HEAD:  Atraumatic, Normocephalic  NECK: Supple, No   JVD  CHEST/LUNG:   no     rales,     no,    rhonchi  HEART: Regular rate and rhythm;         murmur  ABDOMEN: Soft, Nontender, ;   EXTREMITIES:    less    edema  NEUROLOGY:  alert    LABS:                        8.7    6.46  )-----------( 409      ( 12 Jan 2021 07:11 )             30.3     01-13    136  |  95<L>  |  63<H>  ----------------------------<  96  4.3   |  32<H>  |  1.57<H>    Ca    9.3      13 Jan 2021 05:45      PT/INR - ( 13 Jan 2021 05:45 )   PT: 25.4 sec;   INR: 2.20 ratio                         Thyroid Stimulating Hormone, Serum: 1.630 uIU/mL (01-02 @ 06:57)          Consultant(s) Notes Reviewed:      Care Discussed with Consultants/Other Providers:

## 2021-01-13 NOTE — CONSULT NOTE ADULT - SUBJECTIVE AND OBJECTIVE BOX
PULMONARY CONSULT NOTE      DAMARI GUERRERO  MRN-[id number]    Patient is a 70y old  Female who presents with a chief complaint of chf/ chf team (13 Jan 2021 13:02)      HISTORY OF PRESENT ILLNESS:  69 yo with  PMH HTN, A fib, Mitral & Tricuspid valve replacement, CHF, COPD/OHS did not have a new BPAP when she was recently discharged 10/2020 but on home O2, Obesity, MIGUEL(did not tolerate CPAP), pHTN, Gout   initially admitted to [place] fo dizziness/ dyspnea.   Transferred to [facility] for  further care/ management    seen today on 2L- comfortable. was not discharged home on BPAP during last admission  no cough, no wheezing.  feels better since admission    Allergies    No Known Allergies    Intolerances        PAST MEDICAL & SURGICAL HISTORY:  COPD (chronic obstructive pulmonary disease)    Hypertension    CHF (congestive heart failure)    Tricuspid valve replaced    Mitral valve replaced    Gout    HTN (hypertension)    CHF (congestive heart failure)    COPD (chronic obstructive pulmonary disease)  on home O2    MIGUEL (obstructive sleep apnea)    Pulmonary hypertension    Atrial fibrillation  S/P Ablation    No significant past surgical history    Tricuspid valve replaced  mechanical    History of mitral valve replacement with mechanical valve            FAMILY HISTORY:  Family history of hypertension (Mother)    Family history of stroke    Family history of hypertension (Father, Sibling)      Prescriptions:      SOCIAL HISTORY  Smoking History: denies    REVIEW OF SYSTEMS:    CONSTITUTIONAL:  No fevers, chills, sweats    HEENT:  Eyes:  No diplopia or blurred vision. ENT:  No earache, sore throat or runny nose.    CARDIOVASCULAR:  No pressure, squeezing, tightness, or heaviness about the chest; no palpitations.    RESPIRATORY:  Per HPI    GASTROINTESTINAL:  No abdominal pain, nausea, vomiting or diarrhea.    GENITOURINARY:  No dysuria, frequency or urgency.    NEUROLOGIC: + dizziness    PSYCHIATRIC:  No disorder of thought or mood.    Vital Signs Last 24 Hrs  T(C): 36.5 (13 Jan 2021 12:12), Max: 36.8 (13 Jan 2021 04:21)  T(F): 97.7 (13 Jan 2021 12:12), Max: 98.3 (13 Jan 2021 04:21)  HR: 61 (13 Jan 2021 12:12) (61 - 64)  BP: 127/68 (13 Jan 2021 12:12) (112/75 - 137/73)  BP(mean): --  RR: 18 (13 Jan 2021 12:12) (18 - 18)  SpO2: 100% (13 Jan 2021 12:12) (97% - 100%)    PHYSICAL EXAMINATION:    GENERAL: The patient is an elderly female in NAD    HEENT: Head is normocephalic and atraumatic.      NECK: NC> 16 inch    LUNGS: Clear to auscultation without wheezing, rales, or rhonchi. Respirations unlabored    HEART: Regular rate and rhythm without murmur.    ABDOMEN:  obese    EXTREMITIES: trace edema    NEUROLOGIC: Grossly intact      MEDICATIONS  (STANDING):  allopurinol 100 milliGRAM(s) Oral daily  BACItracin   Ointment 1 Application(s) Topical two times a day  budesonide 160 MICROgram(s)/formoterol 4.5 MICROgram(s) Inhaler 2 Puff(s) Inhalation two times a day  buMETAnide 2 milliGRAM(s) Oral two times a day  metoprolol succinate ER 25 milliGRAM(s) Oral daily  simvastatin 10 milliGRAM(s) Oral at bedtime  warfarin 7.5 milliGRAM(s) Oral once      MEDICATIONS  (PRN):  sodium chloride 0.65% Nasal 1 Spray(s) Both Nostrils two times a day PRN Nasal Congestion        LABS:   CBC Full  -  ( 12 Jan 2021 07:11 )  WBC Count : 6.46 K/uL  RBC Count : 3.33 M/uL  Hemoglobin : 8.7 g/dL  Hematocrit : 30.3 %  Platelet Count - Automated : 409 K/uL  Mean Cell Volume : 91.0 fl  Mean Cell Hemoglobin : 26.1 pg  Mean Cell Hemoglobin Concentration : 28.7 gm/dL  Auto Neutrophil # : x  Auto Lymphocyte # : x  Auto Monocyte # : x  Auto Eosinophil # : x  Auto Basophil # : x  Auto Neutrophil % : x  Auto Lymphocyte % : x  Auto Monocyte % : x  Auto Eosinophil % : x  Auto Basophil % : x    PT/INR - ( 13 Jan 2021 05:45 )   PT: 25.4 sec;   INR: 2.20 ratio           01-13    136  |  95<L>  |  63<H>  ----------------------------<  96  4.3   |  32<H>  |  1.57<H>    Ca    9.3      13 Jan 2021 05:45             RADIOLOGY & ADDITIONAL STUDIES:  < from: Xray Chest 1 View- PORTABLE-Urgent (Xray Chest 1 View- PORTABLE-Urgent .) (01.11.21 @ 17:43) >    EXAM:  XR CHEST PORTABLE URGENT 1V                            PROCEDURE DATE:  01/11/2021            INTERPRETATION:  TIME OF EXAM: January 11, 2021 at 5:38 PM.    CLINICAL INFORMATION: COPD. Desaturation on ambulation.    COMPARISON:  December 29,2020.    TECHNIQUE:   AP Portable chest x-ray. Limited by rotation. The chin obscures part of the upper image.    INTERPRETATION:    Heart size and the mediastinum cannot be accurately evaluated on this projection. Median sternotomy sutures, surgicalclips, and prosthetic cardiac valve again seen.  Elevated left hemidiaphragm again seen.  No definite focal lung consolidation seen.  There is continued accentuation of the central pulmonary vascular markings suggesting pulmonary vascular congestion.  No pleural effusion or pneumothorax noted.        IMPRESSION:  Elevated left hemidiaphragm again seen.    Continued accentuation of central pulmonary vascular markings suggesting pulmonary vascular congestion.                ANGELIQUE MICHELLE MD; Attending Radiologist  This document has been electronically signed. Jan 12 2021  9:52AM    < end of copied text >  < from: CT Head No Cont (12.29.20 @ 20:41) >    EXAM:  CT BRAIN                            PROCEDURE DATE:  12/29/2020          INTERPRETATION:  CLINICAL INFORMATION: dizziness, subtherapeutic inr. dizziness, subtherapeutic inr. .    TECHNIQUE: Sequential axial images were obtained from the vertex to the skull base without intravenous contrast. Coronal and sagittal reformations were obtained.    COMPARISON: None .    FINDINGS:    Age-indeterminate right basal ganglia lacunar infarct and right cerebellar lacunar infarct. There is no acute intracranial hemorrhage. There are areas of hypodensity in the bilateral hemispheric white matter suggesting white matter microvascular ischemic change. There is cerebral volume loss.    There is no extraaxial fluid collection.    There is no displaced calvarial fracture. The visualized orbits are within normal limits. Right maxillary sinus mucosal thickening. The mastoid air cells are well aerated.      IMPRESSION: No acute intracranial hemorrhage. Age-indeterminate right basal ganglia lacunar infarct and right cerebellar lacunar infarct. If there is clinical suspicion for acute infarct, consider brain MRI if there is no contraindication.            ALEJANDRA CALLEJAS MD; Attending Radiologist  This document has been electronically signed. Dec 29 2020  8:57PM    < end of copied text >    ECHO:  eho< from: Transthoracic Echocardiogram (01.08.21 @ 15:53) >  ial pressure is 8 mm Hg.  Agitated saline injection demonstrates evidence of a patent  foramen ovale/VSD.  No color Doppler evidence of VSD.  ------------------------------------------------------------------------  Conclusions:  1. Mechanical prosthetic mitral valve replacement. Peak  mitral valve gradient equals 15 mm Hg, mean transmitral  valve gradient equals 6 mm Hg, which is elevated even in  the setting of a mechanical prosthetic mitral valve  replacement (Heart rate 69 bpm).  2. Refer to prior echos for assesssment of LV function.  Septal motion consistent with cardiac surgery, right  ventricular overload.  3. Right atrial enlargement.  4. Right ventricular enlargement with decreased right  ventricular systolic function.  5. An annuloplasty ring is seen in the tricuspid position.  PG 3 mm hg, MG 1 mm hg. No tricuspid regurgitation.  6. Agitated saline injection demonstrates evidence of a  patent foramen ovale/VSD.  No color Doppler evidence of  VSD.  ------------------------------------------------------------------------  Confirmed on  1/8/2021 - 17:47:19 by SHARON Christy  ------------------------------------------------------------------------    < end of copied text >    ASSESSMENT:  69 yo with MIGUEL/OHS    PLAN:  suggest taking off 02 when awake  restart BPAP when asleep 10/5, 30%  need to plan a BPAP machine for her upon discharge  f/u cardio      Thank you for allowing me to participate in the care of this patient.  Please feel free to call me for any questions/concerns.      Irma Eaton, DO  [facility] Pulmonary/Sleep Medicine  [phone]

## 2021-01-13 NOTE — PROGRESS NOTE ADULT - PROBLEM SELECTOR PLAN 1
- c/w increased dose of Bumex 2mg PO BID  - Low Na+ diet  - Fluid restrict 1.5 L, daily standing weights   - Strict I&Os  - TTE (1/8) showing PFO or VSD with agitated saline, no color doppler signals suggestive of VSD  - CardioMEMS was discussed with patient in order to help prevent further HF hospitalizations, which she is considering

## 2021-01-13 NOTE — PROGRESS NOTE ADULT - ASSESSMENT
70 years      h/o   CAD,  mechanical MVR and Tricuspid valve repair,        h/o  chronic   HFpEF,   HTN  . MIUGEL ,     severe MR  ,  s/p mechanical MVR 1999 , [facility] with Dr. Anderson,  and severe TR s/p TVR 2012 ([facility]),       COPD ,on home 02; no prior tobacco use      AFib on Coumadin , failed  ablation,   ,  PHTN,   CKD 3 (b/l SCr ~1.3-1.6),  MIGUEL and gout.     h/o   hypercarbia and she reports  needing Bipap,in the past,  but hasn't used in 10 years.      Morbid  obesity,  BMI is 41     presented with  CP,  dizziness and sob     She also had epistaxis at [facility],/         1.,  Atrial fibrillation       metoprolol ER 25 mg  / lipitor     2.  MVR  mechanical,1999  Continue IV heparin and warfarin with goal INR of 2.5-3.5 with mechanical MVR   daily INR   3.  TVR, 2012 at [facility]   4.  HTN, on Toprol   5. Dizziness , since resolved  Ct head , with infarcts, Right b. ganglia and Right cerebellum   6. .  Anemia,  has  c/c  anemia, , baseline is  8  to 9/   last colonoscopy  was  10 yrs  ago/ s/p melena last yr. and, no w/p was done.,as  pt was  deemed high risk  for  any procedure  7.  Acute  Diastolic  Chf    on  iv bumex bid       daily weights/ follow   I/O//   still  with sob on exertion  8.  Severe Pulm Htn, on echo  Echo  12/20,normal  ef/mod  MS/  severe Pulm Htn  per card dr mcnamara , pt   transferred to  [place]   for ELIZABETH/ ? fistula  from aorta  to RV/     s/p epistaxis  seen by ent  heart  failure team following pt / has lost 3  kg  ELIZABETH,  cancelled  per  floor  by echo dept/  spoke  with  chf   Dr alba from 1/8.  MVR/  decreased  RV function/ no pfo/vsd  on coumadin/ follow inr  pe  ENT, packing removal on 1/12  Mechanical MVR./  on coumadin  on exertion on 1/11, pt with mild  sob and hypoxia  in high  80's. cxr with chf  . bumex  increased  to 2 mg bid   INR  daiy/ not at  goal   house  pulm eval  for  bipap/  MIGUEL       < from: Transthoracic Echocardiogram (01.08.21 @ 15:53) >  Conclusions:  1. Mechanical prosthetic mitral valve replacement. Peak  mitral valve gradient equals 15 mm Hg, mean transmitral  valve gradient equals 6 mm Hg, which is elevated even in  the setting of a mechanical prosthetic mitral valve  replacement (Heart rate 69 bpm).  2. Refer to prior echos for assesssment of LV function.  Septal motion consistent with cardiac surgery, right  ventricular overload.  3. Right atrial enlargement.  4. Right ventricular enlargement with decreased right  ventricular systolic function.  5. An annuloplasty ring is seen in the tricuspid position.  PG 3 mm hg, MG 1 mm hg. No tricuspid regurgitation.  6. Agitated saline injection demonstrates evidence of a  patent foramen ovale/VSD.  No color Doppler evidence of  < end of copied text >     < from: CT Head No Cont (12.29.20 @ 20:41)   IMPRESSION: No acute intracranial hemorrhage. Age-indeterminate right basal ganglia lacunar infarct and right cerebellar lacunar infarct. If there is clinical suspicion for acute infarct, consider brain MRI if there is no contraindication.  < end of copied text >    < from: TTE Echo Complete w/ Contrast w/ Doppler (12.31.20 @ 14:01) >  ummary:   1. Left ventricular ejection fraction, by visual estimation, is 60 to 65%.   2. Technically suboptimal study.   3. Normal global left ventricular systolic function.   4. Normal left ventricular internal cavity size.   5. Spectral Doppler shows pseudonormal pattern of left ventricular myocardial filling (Grade II diastolic dysfunction).   6. Normal right ventricular size and function.   7. Mild to moderately enlarged left atrium.   8. There is no evidence of pericardial effusion.   9. Mild mitral annular calcification.  10. Mild mitral valve regurgitation.  11. Mild thickening and calcification of the anterior and posterior mitral valve leaflets.  12. Peak transmitral mean gradient equals 6.8 mmHg, calculated mitral valve area by pressure half time equals 1.86 cm² consistent withmild mitral stenosis.  13. Mild tricuspid regurgitation.  14. Mild aortic regurgitation.  15. Sclerotic aortic valve with normal opening.  16. Estimated pulmonary artery systolic pressure is 68.4 mmHg assuming a right atrial pressure of 8 mmHg, whichis consistent with severe pulmonary hypertension.  17. C/w the study of 6-21-20, more significant pulmonary htn is now noted.  18. Endocardial visualization was enhanced with intravenous echo contrast.  19. Mitral valve mean gradient is 6.8 mmHg consistent with moderate mitral stenosis.  Montana Barreto MD FACC, FASE, FAC  < end of copied text >

## 2021-01-13 NOTE — PROGRESS NOTE ADULT - ASSESSMENT
69 yo female with a PMHx of CAD, HFpEF (EF 65-60%), severe MR s/p mechanical MVR 1999 (Dr. Anderson, [facility]), severe TR s/p mechanical TVR (2012 [facility]), afib s/p ablation, HTN, HLD, CKD (bl 1.35-1.5), COPD on 3L NC at home, MIGUEL who presented to the [facility] for cp, sob, dizziness x 1 week, found to be in decompensated HFpEF, w/ tachy-ofelia rhythm. Seen by Dr. Thrasher Cardiology at [facility]. s/p IV diuresis w/ lasix BID. TTE 12/31 reviewed by [facility] Dr. Escobar, c/f possible L-R shunt, ?fistula between aortic valve and RV. Patient transferred to [facility] for ELIZABETH and escalation of care. There was no echocardiographic evidence of intracardiac shunt. The patient was noted to desaturate down to 86% while ambulating on 1/11/20 and her CXR was concerning for increased pulmonary vascular congestion so HF was called back to evaluate volume status. She currently appears mildly volume overloaded on exam and her renal function is overall stable.    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

## 2021-01-14 LAB
ANION GAP SERPL CALC-SCNC: 7 MMOL/L — SIGNIFICANT CHANGE UP (ref 5–17)
APTT BLD: >200 SEC — CRITICAL HIGH (ref 27.5–35.5)
BASE EXCESS BLDA CALC-SCNC: 9.7 MMOL/L — HIGH (ref -2–2)
BUN SERPL-MCNC: 64 MG/DL — HIGH (ref 7–23)
CALCIUM SERPL-MCNC: 10 MG/DL — SIGNIFICANT CHANGE UP (ref 8.4–10.5)
CHLORIDE SERPL-SCNC: 94 MMOL/L — LOW (ref 96–108)
CO2 BLDA-SCNC: 37 MMOL/L — HIGH (ref 22–30)
CO2 SERPL-SCNC: 33 MMOL/L — HIGH (ref 22–31)
CREAT SERPL-MCNC: 1.63 MG/DL — HIGH (ref 0.5–1.3)
GLUCOSE SERPL-MCNC: 91 MG/DL — SIGNIFICANT CHANGE UP (ref 70–99)
HCO3 BLDA-SCNC: 36 MMOL/L — HIGH (ref 21–29)
HCT VFR BLD CALC: 29.1 % — LOW (ref 34.5–45)
HCT VFR BLD CALC: 31.1 % — LOW (ref 34.5–45)
HGB BLD-MCNC: 8.4 G/DL — LOW (ref 11.5–15.5)
HGB BLD-MCNC: 8.8 G/DL — LOW (ref 11.5–15.5)
INR BLD: 2.29 RATIO — HIGH (ref 0.88–1.16)
MCHC RBC-ENTMCNC: 25.4 PG — LOW (ref 27–34)
MCHC RBC-ENTMCNC: 26.3 PG — LOW (ref 27–34)
MCHC RBC-ENTMCNC: 28.3 GM/DL — LOW (ref 32–36)
MCHC RBC-ENTMCNC: 28.9 GM/DL — LOW (ref 32–36)
MCV RBC AUTO: 89.9 FL — SIGNIFICANT CHANGE UP (ref 80–100)
MCV RBC AUTO: 91.2 FL — SIGNIFICANT CHANGE UP (ref 80–100)
NRBC # BLD: 0 /100 WBCS — SIGNIFICANT CHANGE UP (ref 0–0)
NRBC # BLD: 0 /100 WBCS — SIGNIFICANT CHANGE UP (ref 0–0)
PCO2 BLDA: 59 MMHG — HIGH (ref 32–46)
PH BLDA: 7.4 — SIGNIFICANT CHANGE UP (ref 7.35–7.45)
PLATELET # BLD AUTO: 399 K/UL — SIGNIFICANT CHANGE UP (ref 150–400)
PLATELET # BLD AUTO: 405 K/UL — HIGH (ref 150–400)
PO2 BLDA: 35 MMHG — CRITICAL LOW (ref 74–108)
POTASSIUM SERPL-MCNC: 4.5 MMOL/L — SIGNIFICANT CHANGE UP (ref 3.5–5.3)
POTASSIUM SERPL-SCNC: 4.5 MMOL/L — SIGNIFICANT CHANGE UP (ref 3.5–5.3)
PROTHROM AB SERPL-ACNC: 26.4 SEC — HIGH (ref 10.6–13.6)
RBC # BLD: 3.19 M/UL — LOW (ref 3.8–5.2)
RBC # BLD: 3.46 M/UL — LOW (ref 3.8–5.2)
RBC # FLD: 16.6 % — HIGH (ref 10.3–14.5)
RBC # FLD: 16.7 % — HIGH (ref 10.3–14.5)
SAO2 % BLDA: 57 % — LOW (ref 92–96)
SODIUM SERPL-SCNC: 134 MMOL/L — LOW (ref 135–145)
WBC # BLD: 5.75 K/UL — SIGNIFICANT CHANGE UP (ref 3.8–10.5)
WBC # BLD: 6.23 K/UL — SIGNIFICANT CHANGE UP (ref 3.8–10.5)
WBC # FLD AUTO: 5.75 K/UL — SIGNIFICANT CHANGE UP (ref 3.8–10.5)
WBC # FLD AUTO: 6.23 K/UL — SIGNIFICANT CHANGE UP (ref 3.8–10.5)

## 2021-01-14 PROCEDURE — 99233 SBSQ HOSP IP/OBS HIGH 50: CPT

## 2021-01-14 PROCEDURE — 99232 SBSQ HOSP IP/OBS MODERATE 35: CPT

## 2021-01-14 RX ORDER — HEPARIN SODIUM 5000 [USP'U]/ML
INJECTION INTRAVENOUS; SUBCUTANEOUS
Qty: 25000 | Refills: 0 | Status: DISCONTINUED | OUTPATIENT
Start: 2021-01-14 | End: 2021-01-15

## 2021-01-14 RX ORDER — HEPARIN SODIUM 5000 [USP'U]/ML
9000 INJECTION INTRAVENOUS; SUBCUTANEOUS EVERY 6 HOURS
Refills: 0 | Status: DISCONTINUED | OUTPATIENT
Start: 2021-01-14 | End: 2021-01-15

## 2021-01-14 RX ORDER — WARFARIN SODIUM 2.5 MG/1
7.5 TABLET ORAL ONCE
Refills: 0 | Status: COMPLETED | OUTPATIENT
Start: 2021-01-14 | End: 2021-01-14

## 2021-01-14 RX ORDER — HEPARIN SODIUM 5000 [USP'U]/ML
4000 INJECTION INTRAVENOUS; SUBCUTANEOUS EVERY 6 HOURS
Refills: 0 | Status: DISCONTINUED | OUTPATIENT
Start: 2021-01-14 | End: 2021-01-15

## 2021-01-14 RX ADMIN — BUMETANIDE 2 MILLIGRAM(S): 0.25 INJECTION INTRAMUSCULAR; INTRAVENOUS at 17:52

## 2021-01-14 RX ADMIN — BUDESONIDE AND FORMOTEROL FUMARATE DIHYDRATE 2 PUFF(S): 160; 4.5 AEROSOL RESPIRATORY (INHALATION) at 17:53

## 2021-01-14 RX ADMIN — HEPARIN SODIUM 1600 UNIT(S)/HR: 5000 INJECTION INTRAVENOUS; SUBCUTANEOUS at 19:20

## 2021-01-14 RX ADMIN — SIMVASTATIN 10 MILLIGRAM(S): 20 TABLET, FILM COATED ORAL at 21:32

## 2021-01-14 RX ADMIN — HEPARIN SODIUM 0 UNIT(S)/HR: 5000 INJECTION INTRAVENOUS; SUBCUTANEOUS at 17:51

## 2021-01-14 RX ADMIN — Medication 1 APPLICATION(S): at 17:52

## 2021-01-14 RX ADMIN — WARFARIN SODIUM 7.5 MILLIGRAM(S): 2.5 TABLET ORAL at 21:32

## 2021-01-14 RX ADMIN — Medication 100 MILLIGRAM(S): at 17:52

## 2021-01-14 RX ADMIN — BUDESONIDE AND FORMOTEROL FUMARATE DIHYDRATE 2 PUFF(S): 160; 4.5 AEROSOL RESPIRATORY (INHALATION) at 05:42

## 2021-01-14 RX ADMIN — HEPARIN SODIUM 1900 UNIT(S)/HR: 5000 INJECTION INTRAVENOUS; SUBCUTANEOUS at 08:40

## 2021-01-14 RX ADMIN — Medication 25 MILLIGRAM(S): at 05:42

## 2021-01-14 RX ADMIN — BUMETANIDE 2 MILLIGRAM(S): 0.25 INJECTION INTRAMUSCULAR; INTRAVENOUS at 05:42

## 2021-01-14 RX ADMIN — Medication 1 APPLICATION(S): at 05:42

## 2021-01-14 NOTE — PROGRESS NOTE ADULT - PROBLEM SELECTOR PROBLEM 4
Chronic atrial fibrillation

## 2021-01-14 NOTE — PROGRESS NOTE ADULT - SUBJECTIVE AND OBJECTIVE BOX
PULMONARY PROGRESS NOTE    DAMARI GUERRERO  MRN-[id number]    Patient is a 70y old  Female who presents with a chief complaint of chf/ chf team (14 Jan 2021 14:33)      HPI:  -  -    ROS:   -    ACTIVE MEDICATION LIST:  MEDICATIONS  (STANDING):  allopurinol 100 milliGRAM(s) Oral daily  BACItracin   Ointment 1 Application(s) Topical two times a day  budesonide 160 MICROgram(s)/formoterol 4.5 MICROgram(s) Inhaler 2 Puff(s) Inhalation two times a day  buMETAnide 2 milliGRAM(s) Oral two times a day  heparin  Infusion.  Unit(s)/Hr (19 mL/Hr) IV Continuous <Continuous>  metoprolol succinate ER 25 milliGRAM(s) Oral daily  simvastatin 10 milliGRAM(s) Oral at bedtime  warfarin 7.5 milliGRAM(s) Oral once    MEDICATIONS  (PRN):  heparin   Injectable 9000 Unit(s) IV Push every 6 hours PRN For aPTT less than 40  heparin   Injectable 4000 Unit(s) IV Push every 6 hours PRN For aPTT between 40 - 57  sodium chloride 0.65% Nasal 1 Spray(s) Both Nostrils two times a day PRN Nasal Congestion      EXAM:  Vital Signs Last 24 Hrs  T(C): 37.1 (14 Jan 2021 14:06), Max: 37.3 (13 Jan 2021 21:14)  T(F): 98.8 (14 Jan 2021 14:06), Max: 99.2 (13 Jan 2021 21:14)  HR: 63 (14 Jan 2021 14:06) (56 - 63)  BP: 145/77 (14 Jan 2021 14:06) (120/62 - 145/77)  BP(mean): --  RR: 19 (14 Jan 2021 14:06) (18 - 19)  SpO2: 97% (14 Jan 2021 14:06) (96% - 99%)    GENERAL: The patient is awake and alert in no apparent distress.     LUNGS: Clear to auscultation without wheezing, rales or rhonchi; respirations unlabored    HEART: Regular rate and rhythm without murmur.                            8.4    6.23  )-----------( 405      ( 14 Jan 2021 07:24 )             29.1       01-14    134<L>  |  94<L>  |  64<H>  ----------------------------<  91  4.5   |  33<H>  |  1.63<H>    Ca    10.0      14 Jan 2021 07:23       ra< from: Xray Chest 1 View- PORTABLE-Urgent (Xray Chest 1 View- PORTABLE-Urgent .) (01.11.21 @ 17:43) >    EXAM:  XR CHEST PORTABLE URGENT 1V                            PROCEDURE DATE:  01/11/2021            INTERPRETATION:  TIME OF EXAM: January 11, 2021 at 5:38 PM.    CLINICAL INFORMATION: COPD. Desaturation on ambulation.    COMPARISON:  December 29,2020.    TECHNIQUE:   AP Portable chest x-ray. Limited by rotation. The chin obscures part of the upper image.    INTERPRETATION:    Heart size and the mediastinum cannot be accurately evaluated on this projection. Median sternotomy sutures, surgicalclips, and prosthetic cardiac valve again seen.  Elevated left hemidiaphragm again seen.  No definite focal lung consolidation seen.  There is continued accentuation of the central pulmonary vascular markings suggesting pulmonary vascular congestion.  No pleural effusion or pneumothorax noted.        IMPRESSION:  Elevated left hemidiaphragm again seen.    Continued accentuation of central pulmonary vascular markings suggesting pulmonary vascular congestion.                ANGELIQUE MICHELLE MD; Attending Radiologist  This document has been electronically signed. Jan 12 2021  9:52AM    < end of copied text >      PROBLEM LIST:  70y Female with HEALTH ISSUES - PROBLEM Dx:  Epistaxis  Epistaxis    Chronic kidney disease (CKD)  Chronic kidney disease (CKD)    Tricuspid valve replaced  Tricuspid valve replaced    Pulmonary hypertension  Pulmonary hypertension    COPD (chronic obstructive pulmonary disease)  COPD (chronic obstructive pulmonary disease)    Mitral valve replaced  Mitral valve replaced    Chronic atrial fibrillation  Chronic atrial fibrillation    Heart failure with preserved ejection fraction  Heart failure with preserved ejection fraction              RECS:        Please call with any questions.    Irma Eaton DO  [facility] Pulmonary/Sleep Medicine  [phone]   PULMONARY PROGRESS NOTE    DAMARI GUERRERO  MRN-[id number]    Patient is a 70y old  Female who presents with a chief complaint of chf/ chf team (14 Jan 2021 14:33)      HPI:  was on 10/5 overnight  feels well rested  comfortable on BPAP  on 02 now  -    ROS:   -    ACTIVE MEDICATION LIST:  MEDICATIONS  (STANDING):  allopurinol 100 milliGRAM(s) Oral daily  BACItracin   Ointment 1 Application(s) Topical two times a day  budesonide 160 MICROgram(s)/formoterol 4.5 MICROgram(s) Inhaler 2 Puff(s) Inhalation two times a day  buMETAnide 2 milliGRAM(s) Oral two times a day  heparin  Infusion.  Unit(s)/Hr (19 mL/Hr) IV Continuous <Continuous>  metoprolol succinate ER 25 milliGRAM(s) Oral daily  simvastatin 10 milliGRAM(s) Oral at bedtime  warfarin 7.5 milliGRAM(s) Oral once    MEDICATIONS  (PRN):  heparin   Injectable 9000 Unit(s) IV Push every 6 hours PRN For aPTT less than 40  heparin   Injectable 4000 Unit(s) IV Push every 6 hours PRN For aPTT between 40 - 57  sodium chloride 0.65% Nasal 1 Spray(s) Both Nostrils two times a day PRN Nasal Congestion      EXAM:  Vital Signs Last 24 Hrs  T(C): 37.1 (14 Jan 2021 14:06), Max: 37.3 (13 Jan 2021 21:14)  T(F): 98.8 (14 Jan 2021 14:06), Max: 99.2 (13 Jan 2021 21:14)  HR: 63 (14 Jan 2021 14:06) (56 - 63)  BP: 145/77 (14 Jan 2021 14:06) (120/62 - 145/77)  BP(mean): --  RR: 19 (14 Jan 2021 14:06) (18 - 19)  SpO2: 97% (14 Jan 2021 14:06) (96% - 99%)    GENERAL: The patient is awake and alert in no apparent distress.     LUNGS: Clear to auscultation without wheezing, rales or rhonchi; respirations unlabored    HEART: Regular rate and rhythm without murmur.                            8.4    6.23  )-----------( 405      ( 14 Jan 2021 07:24 )             29.1       01-14    134<L>  |  94<L>  |  64<H>  ----------------------------<  91  4.5   |  33<H>  |  1.63<H>    Ca    10.0      14 Jan 2021 07:23       ra< from: Xray Chest 1 View- PORTABLE-Urgent (Xray Chest 1 View- PORTABLE-Urgent .) (01.11.21 @ 17:43) >    EXAM:  XR CHEST PORTABLE URGENT 1V                            PROCEDURE DATE:  01/11/2021            INTERPRETATION:  TIME OF EXAM: January 11, 2021 at 5:38 PM.    CLINICAL INFORMATION: COPD. Desaturation on ambulation.    COMPARISON:  December 29,2020.    TECHNIQUE:   AP Portable chest x-ray. Limited by rotation. The chin obscures part of the upper image.    INTERPRETATION:    Heart size and the mediastinum cannot be accurately evaluated on this projection. Median sternotomy sutures, surgicalclips, and prosthetic cardiac valve again seen.  Elevated left hemidiaphragm again seen.  No definite focal lung consolidation seen.  There is continued accentuation of the central pulmonary vascular markings suggesting pulmonary vascular congestion.  No pleural effusion or pneumothorax noted.        IMPRESSION:  Elevated left hemidiaphragm again seen.    Continued accentuation of central pulmonary vascular markings suggesting pulmonary vascular congestion.                ANGELIQUE MICHELLE MD; Attending Radiologist  This document has been electronically signed. Jan 12 2021  9:52AM    < end of copied text >      PROBLEM LIST:  70y Female with HEALTH ISSUES - PROBLEM Dx:  Epistaxis  Epistaxis    Chronic kidney disease (CKD)  Chronic kidney disease (CKD)    Tricuspid valve replaced  Tricuspid valve replaced    Pulmonary hypertension  Pulmonary hypertension    COPD (chronic obstructive pulmonary disease)  COPD (chronic obstructive pulmonary disease)    Mitral valve replaced  Mitral valve replaced    Chronic atrial fibrillation  Chronic atrial fibrillation    Heart failure with preserved ejection fraction  Heart failure with preserved ejection fraction              RECS:  continue BPAP  take her off 02 when awake  she needs BPAP upon discharge- documented hypercarbia on her 10/2020 admission        Please call with any questions.    Irma Eaton DO  [facility] Pulmonary/Sleep Medicine  [phone]

## 2021-01-14 NOTE — PROGRESS NOTE ADULT - SUBJECTIVE AND OBJECTIVE BOX
afebrile/      REVIEW OF SYSTEMS:  GEN: no fever,    no chills  RESP: no SOB,   no cough  CVS: no chest pain,   no palpitations  GI: no abdominal pain,   no nausea,   no vomiting,   no constipation,   no diarrhea  : no dysuria,   no frequency  NEURO: no headache,   no dizziness  PSYCH: no depression,   not anxious  Derm : no rash    MEDICATIONS  (STANDING):  allopurinol 100 milliGRAM(s) Oral daily  BACItracin   Ointment 1 Application(s) Topical two times a day  budesonide 160 MICROgram(s)/formoterol 4.5 MICROgram(s) Inhaler 2 Puff(s) Inhalation two times a day  buMETAnide 2 milliGRAM(s) Oral two times a day  metoprolol succinate ER 25 milliGRAM(s) Oral daily  simvastatin 10 milliGRAM(s) Oral at bedtime    MEDICATIONS  (PRN):  sodium chloride 0.65% Nasal 1 Spray(s) Both Nostrils two times a day PRN Nasal Congestion      Vital Signs Last 24 Hrs  T(C): 36.6 (14 Jan 2021 05:30), Max: 37.3 (13 Jan 2021 21:14)  T(F): 97.9 (14 Jan 2021 05:30), Max: 99.2 (13 Jan 2021 21:14)  HR: 63 (14 Jan 2021 05:46) (56 - 63)  BP: 128/80 (14 Jan 2021 05:30) (120/62 - 128/80)  BP(mean): --  RR: 18 (14 Jan 2021 05:30) (18 - 18)  SpO2: 97% (14 Jan 2021 05:46) (96% - 100%)  CAPILLARY BLOOD GLUCOSE        I&O's Summary    12 Jan 2021 07:01  -  13 Jan 2021 07:00  --------------------------------------------------------  IN: 900 mL / OUT: 1850 mL / NET: -950 mL    13 Jan 2021 07:01  -  14 Jan 2021 06:54  --------------------------------------------------------  IN: 720 mL / OUT: 2150 mL / NET: -1430 mL        PHYSICAL EXAM:  HEAD:  Atraumatic, Normocephalic  NECK: Supple, No   JVD  CHEST/LUNG:   no     rales,     no,    rhonchi  HEART: Regular rate and rhythm;         murmur  ABDOMEN: Soft, Nontender, ;   EXTREMITIES:    less    edema  NEUROLOGY:  alert    LABS:                        8.7    6.46  )-----------( 409      ( 12 Jan 2021 07:11 )             30.3     01-13    136  |  95<L>  |  63<H>  ----------------------------<  96  4.3   |  32<H>  |  1.57<H>    Ca    9.3      13 Jan 2021 05:45      PT/INR - ( 13 Jan 2021 05:45 )   PT: 25.4 sec;   INR: 2.20 ratio                         Thyroid Stimulating Hormone, Serum: 1.630 uIU/mL (01-02 @ 06:57)          Consultant(s) Notes Reviewed:      Care Discussed with Consultants/Other Providers:

## 2021-01-14 NOTE — PROGRESS NOTE ADULT - PROBLEM SELECTOR PLAN 2
- s/p mechanical TVR 2012 at [facility]  - Severe functional TR on TTE 12/31, no TR noted on ELIZABETH on 1/8  - c/w daily coumadin dosing per primary team

## 2021-01-14 NOTE — CHART NOTE - NSCHARTNOTEFT_GEN_A_CORE
Nutrition Follow Up Note  Patient seen for: follow up on 3 DSU    chart reviewed, events noted     as per care coordinator note on : Pt nasal packing was removed and pt began to have brisk bleeding from right nare shortly after removal. At bedside patient was cauterized with silver nitrate and a posterior rapid rhino was placed.  As per record pt is to ENT, packing removal on .  on po  antibiotic and poi bumex.  discharge plan anticipated for home with home physical therapy.    · Reason for Admission	chf/ chf team      Source: pt, EMR    Diet : Diet, Regular:   1500mL Fluid Restriction ([id number])  Supplement Feeding Modality:  Oral  Probiotic Yogurt/Smoothie Cans or Servings Per Day:  2       Frequency:  Daily (21 @ 14:54) [Active]          Patient reports:   RD was unable to conduct a face to face interview due to limited contact restrictions related to their medical condition and isolation precautions. Spoke with pt over the phone. pt having trouble chewing -agrees to Soft diet but not Mechanical Soft. would appreciate ensure enlive 1 x daily. wishes to continue with Danactive 2 x daily. pt avoiding vegetabels secondary to coumadin. RD reinforced the need for Vit K consistency.      PO intake : <25% of meals as per pt          Daily Weight in k.9 (), Weight in k.2 (), Weight in k.5 (), Weight in k.9 (), Weight in k.5 (), Weight in k (10), Weight in k.1 ()  % Weight Change: 1% loss since previous assess on     Pertinent Medications: MEDICATIONS  (STANDING):  allopurinol 100 milliGRAM(s) Oral daily  BACItracin   Ointment 1 Application(s) Topical two times a day  budesonide 160 MICROgram(s)/formoterol 4.5 MICROgram(s) Inhaler 2 Puff(s) Inhalation two times a day  buMETAnide 2 milliGRAM(s) Oral two times a day  heparin  Infusion.  Unit(s)/Hr (19 mL/Hr) IV Continuous <Continuous>  metoprolol succinate ER 25 milliGRAM(s) Oral daily  simvastatin 10 milliGRAM(s) Oral at bedtime  warfarin 7.5 milliGRAM(s) Oral once    MEDICATIONS  (PRN):  heparin   Injectable 9000 Unit(s) IV Push every 6 hours PRN For aPTT less than 40  heparin   Injectable 4000 Unit(s) IV Push every 6 hours PRN For aPTT between 40 - 57  sodium chloride 0.65% Nasal 1 Spray(s) Both Nostrils two times a day PRN Nasal Congestion    Pertinent Labs:  @ 07:23: Na 134<L>, BUN 64<H>, Cr 1.63<H>, BG 91, K+ 4.5  creatinine trending up          Skin per nursing documentation:   Edema: +1 right ankle, left ankle, left leg, right leg, left foot, right foot.    Estimated Needs:   [x ] no change since previous assessment  [ ] recalculated:     Estimated Energy Needs:  · Weight (lbs)	132 lb  · Weight (kg)	59.8 kg  · Enter From (prem/kg)	25  · Enter To (prem/kg)	30  · Calculated From (prem/kg)	1495  · Calculated To (prem/kg)	1794     Estimated Protein Needs:  · Weight (lbs)	132 lb  · Weight (kg)	59.8 kg  · Enter From (g/kg)	1  · Enter To (g/kg)	1.2  · Calculated From (g/kg)	59.8  · Calculated To (g/kg)	71.76     Estimated Fluid Needs:  · Weight (lbs)	132 lb  · Weight (kg)	59.8 kg  · Enter From (ml/kg)	25  · Enter To (ml/kg)	30  · Calculated From (ml/kg)	1495  · Calculated To (ml/kg)	1794     Other Calculations:  · Other Calculations	needs based on IBW + 10%      Previous Nutrition Diagnosis: Overweight/Obesity  Nutrition Diagnosis is: ongoing - will address with modified diet         Recommend  recommend Soft, DASH, 1500fluid restriction.   recommend ensure 1 x daily.   monitor need for Coumadin ed reinforcement.   continue Danactive x 2 daily  pending verification placed     Monitoring and Evaluation:     Continue to monitor Nutritional intake, Tolerance to diet prescription, weights, labs, skin integrity    RD remains available upon request and will follow up per protocol  Ailin Francois MA, RD, CDN #[phone]

## 2021-01-14 NOTE — PROGRESS NOTE ADULT - PROBLEM SELECTOR PROBLEM 3
Mitral valve replaced

## 2021-01-14 NOTE — PROGRESS NOTE ADULT - PROBLEM SELECTOR PROBLEM 2
Tricuspid valve replaced

## 2021-01-14 NOTE — PROGRESS NOTE ADULT - PROBLEM SELECTOR PLAN 6
- COPD/MIGUEL   - ON 3L NC at home   - O2 requirements at baseline
COPD/MIGUEL   - ON 3L NC at home   - O2 requirements at bl.
- COPD/MIGUEL   - ON 3L NC at home   - O2 requirements at baseline  - Would benefit from CPAP at night for MIGUEL and should be referred for outpatient HST
- COPD/MIGUEL   - ON 2-3L NC at home   - O2 requirements at baseline  - Nocturnal BiPAP initiated; should be referred for outpatient HST
COPD/MIGUEL   - ON 3L NC at home   - O2 requirements at bl.
- COPD/MIGUEL   - ON 3L NC at home   - O2 requirements at baseline  - Would benefit from CPAP at night for MIGUEL and should be referred for outpatient HST

## 2021-01-14 NOTE — PROGRESS NOTE ADULT - PROBLEM SELECTOR PLAN 7
- Presented to [facility] w/ MISAEL now improving  - holding home ramipril 5mg daily for MISAEL on CKD

## 2021-01-14 NOTE — PROGRESS NOTE ADULT - PROBLEM SELECTOR PLAN 1
- optimized for discharge; will f/u in HF clinic on 1/25 with NP (Dr. Foster)  - c/w increased dose of Bumex 2mg PO BID  - Low Na+ diet  - Fluid restrict 1.5 L, daily standing weights   - Strict I&Os  - TTE (1/8) showing PFO or VSD with agitated saline, no color doppler signals suggestive of VSD  - CardioMEMS was discussed with patient in detail to prevent future admissions for decompensated heart failure however the patient declines - optimized for discharge; will f/u in HF clinic on 1/25 with NP (Dr. Foster)  - c/w increased dose of Bumex 2mg PO BID  - CardioMEMS was discussed with patient in detail to prevent future admissions for decompensated heart failure however the patient declines  - May benefit from spironolactone and she is willing to enroll in Spirit trial  - Low Na+ diet  - Fluid restrict 1.5 L, daily standing weights   - Strict I&Os  - TTE (1/8) showing PFO or VSD with agitated saline, no color doppler signals suggestive of VSD - optimized for discharge; will f/u in HF clinic on 1/25 at 8:30am (attending Dr. Foster)  - c/w increased dose of Bumex 2mg PO BID  - CardioMEMS was discussed with patient in detail to prevent future admissions for decompensated heart failure however the patient declines  - May benefit from spironolactone and she is willing to enroll in SPIRRIT clinical trial  - Low Na+ diet  - Fluid restrict 1.5 L, daily standing weights   - Strict I&Os  - TTE (1/8) showing PFO or VSD with agitated saline, no color doppler signals suggestive of VSD - optimized for discharge; will f/u in HF clinic on 1/25 at 8:30am (attending Dr. Foster)  - c/w increased dose of Bumex 2mg PO BID  - CardioMEMS was discussed with patient in detail to prevent future admissions for decompensated heart failure however the patient declines  - May benefit from spironolactone and she is willing to enroll in SPIRRIT clinical trial  - Low Na+ diet  - Fluid restrict 1.5 L, daily standing weights   - Strict I&Os  - TTE (1/8) showing PFO or VSD with agitated saline however no color doppler signals were suggestive of VSD

## 2021-01-14 NOTE — PROGRESS NOTE ADULT - ASSESSMENT
70 years      h/o   CAD,  mechanical MVR and Tricuspid valve repair,        h/o  chronic   HFpEF,   HTN  . MIGUEL ,     severe MR  ,  s/p mechanical MVR 1999 , [facility] with Dr. Anderson,  and severe TR s/p TVR 2012 ([facility]),       COPD ,on home 02; no prior tobacco use      AFib on Coumadin , failed  ablation,   ,  PHTN,   CKD 3 (b/l SCr ~1.3-1.6),  MIGUEL and gout.     h/o   hypercarbia and she reports  needing Bipap,in the past,  but hasn't used in 10 years.      Morbid  obesity,  BMI is 41     presented with  CP,  dizziness and sob     She also had epistaxis at [facility],/         1.,  Atrial fibrillation       metoprolol ER 25 mg  / lipitor     2.  MVR  mechanical,1999   was  on  IV heparin and warfarin with goal INR of 2.5-3.5 with mechanical MVR   daily INR   3.  TVR, 2012 at [facility]   4.  HTN, on Toprol   5. Dizziness , since resolved  Ct head , with infarcts, Right b. ganglia and Right cerebellum   6. .  Anemia,  has  c/c  anemia, , baseline is  8  to 9/   last colonoscopy  was  10 yrs  ago/ s/p melena last yr. and, no w/p was done.,as  pt was  deemed high risk  for  any procedure  7.  Acute  Diastolic  Chf    was on  iv bumex bid       daily weights/ follow   I/O//   still  with sob on exertion  8.  Severe Pulm Htn, on echo  Echo  12/20,normal  ef/mod  MS/  severe Pulm Htn  per card dr mcnamara , pt   transferred to  [place]   for ELIZABETH/ ? fistula  from aorta  to RV/     s/p epistaxis  seen by ent  heart  failure team following pt / has lost 3  kg  ELIZABETH,  cancelled  per  floor  by echo dept/  spoke  with  chf   Dr alba from 1/8.  MVR/  decreased  RV function/ no pfo/vsd  on coumadin/ follow inr  pe  ENT, packing removal on 1/12  Mechanical MVR./  on coumadin  on exertion on 1/11, pt with mild  sob and hypoxia  in high  80's. cxr with chf  . bumex  increased  to 2 mg bid   INR  daiy/   IBR  pending/ lost  4  Kg  pulm to  set  up  nocturnal  Bpap/  MIGUEL       < from: Transthoracic Echocardiogram (01.08.21 @ 15:53) >  Conclusions:  1. Mechanical prosthetic mitral valve replacement. Peak  mitral valve gradient equals 15 mm Hg, mean transmitral  valve gradient equals 6 mm Hg, which is elevated even in  the setting of a mechanical prosthetic mitral valve  replacement (Heart rate 69 bpm).  2. Refer to prior echos for assesssment of LV function.  Septal motion consistent with cardiac surgery, right  ventricular overload.  3. Right atrial enlargement.  4. Right ventricular enlargement with decreased right  ventricular systolic function.  5. An annuloplasty ring is seen in the tricuspid position.  PG 3 mm hg, MG 1 mm hg. No tricuspid regurgitation.  6. Agitated saline injection demonstrates evidence of a  patent foramen ovale/VSD.  No color Doppler evidence of  < end of copied text >     < from: CT Head No Cont (12.29.20 @ 20:41)   IMPRESSION: No acute intracranial hemorrhage. Age-indeterminate right basal ganglia lacunar infarct and right cerebellar lacunar infarct. If there is clinical suspicion for acute infarct, consider brain MRI if there is no contraindication.  < end of copied text >    < from: TTE Echo Complete w/ Contrast w/ Doppler (12.31.20 @ 14:01) >  ummary:   1. Left ventricular ejection fraction, by visual estimation, is 60 to 65%.   2. Technically suboptimal study.   3. Normal global left ventricular systolic function.   4. Normal left ventricular internal cavity size.   5. Spectral Doppler shows pseudonormal pattern of left ventricular myocardial filling (Grade II diastolic dysfunction).   6. Normal right ventricular size and function.   7. Mild to moderately enlarged left atrium.   8. There is no evidence of pericardial effusion.   9. Mild mitral annular calcification.  10. Mild mitral valve regurgitation.  11. Mild thickening and calcification of the anterior and posterior mitral valve leaflets.  12. Peak transmitral mean gradient equals 6.8 mmHg, calculated mitral valve area by pressure half time equals 1.86 cm² consistent withmild mitral stenosis.  13. Mild tricuspid regurgitation.  14. Mild aortic regurgitation.  15. Sclerotic aortic valve with normal opening.  16. Estimated pulmonary artery systolic pressure is 68.4 mmHg assuming a right atrial pressure of 8 mmHg, whichis consistent with severe pulmonary hypertension.  17. C/w the study of 6-21-20, more significant pulmonary htn is now noted.  18. Endocardial visualization was enhanced with intravenous echo contrast.  19. Mitral valve mean gradient is 6.8 mmHg consistent with moderate mitral stenosis.  Montana Barreto MD FACC, FASE, FAC  < end of copied text >                	   70 years      h/o   CAD,  mechanical MVR and Tricuspid valve repair,        h/o  chronic   HFpEF,   HTN  . MIGUEL ,     severe MR  ,  s/p mechanical MVR 1999 , [facility] with Dr. Anderson,  and severe TR s/p TVR 2012 ([facility]),       COPD ,on home 02; no prior tobacco use      AFib on Coumadin , failed  ablation,   ,  PHTN,   CKD 3 (b/l SCr ~1.3-1.6),  MIGUEL and gout.     h/o   hypercarbia and she reports  needing Bipap,in the past,  but hasn't used in 10 years.      Morbid  obesity,  BMI is 41     presented with  CP,  dizziness and sob     She also had epistaxis at [facility],/         1.,  Atrial fibrillation       metoprolol ER 25 mg  / lipitor     2.  MVR  mechanical,1999   was  on  IV heparin and warfarin with goal INR of 2.5-3.5 with mechanical MVR   daily INR   3.  TVR, 2012 at [facility]   4.  HTN, on Toprol   5. Dizziness , since resolved  Ct head , with infarcts, Right b. ganglia and Right cerebellum   6. .  Anemia,  has  c/c  anemia, , baseline is  8  to 9/   last colonoscopy  was  10 yrs  ago/ s/p melena last yr. and, no w/p was done.,as  pt was  deemed high risk  for  any procedure  7.  Acute  Diastolic  Chf    was on  iv bumex bid       daily weights/ follow   I/O//   still  with sob on exertion  8.  Severe Pulm Htn, on echo  Echo  12/20,normal  ef/mod  MS/  severe Pulm Htn  per card dr mcnamara , pt   transferred to  [place]   for ELIZABETH/ ? fistula  from aorta  to RV/     s/p epistaxis  seen by ent  heart  failure team following pt / has lost 3  kg  ELIZABETH,  cancelled  per  floor  by echo dept/  spoke  with  chf   Dr alba from 1/8.  MVR/  decreased  RV function/ no pfo/vsd  pe  ENT, packing removal on 1/12  Mechanical MVR./  on coumadin  on exertion on 1/11, pt with mild  sob and hypoxia  in high  80's. cxr with chf  . bumex  increased  to 2 mg bid/  pt ha slost 4 Kg   INR  daily  pulm to  set  up  nocturnal  Bpap/  MIGUEL  inr  is  2.2/ on  bridging iv heparin today       < from: Transthoracic Echocardiogram (01.08.21 @ 15:53) >  Conclusions:  1. Mechanical prosthetic mitral valve replacement. Peak  mitral valve gradient equals 15 mm Hg, mean transmitral  valve gradient equals 6 mm Hg, which is elevated even in  the setting of a mechanical prosthetic mitral valve  replacement (Heart rate 69 bpm).  2. Refer to prior echos for assesssment of LV function.  Septal motion consistent with cardiac surgery, right  ventricular overload.  3. Right atrial enlargement.  4. Right ventricular enlargement with decreased right  ventricular systolic function.  5. An annuloplasty ring is seen in the tricuspid position.  PG 3 mm hg, MG 1 mm hg. No tricuspid regurgitation.  6. Agitated saline injection demonstrates evidence of a  patent foramen ovale/VSD.  No color Doppler evidence of  < end of copied text >     < from: CT Head No Cont (12.29.20 @ 20:41)   IMPRESSION: No acute intracranial hemorrhage. Age-indeterminate right basal ganglia lacunar infarct and right cerebellar lacunar infarct. If there is clinical suspicion for acute infarct, consider brain MRI if there is no contraindication.  < end of copied text >    < from: TTE Echo Complete w/ Contrast w/ Doppler (12.31.20 @ 14:01) >  ummary:   1. Left ventricular ejection fraction, by visual estimation, is 60 to 65%.   2. Technically suboptimal study.   3. Normal global left ventricular systolic function.   4. Normal left ventricular internal cavity size.   5. Spectral Doppler shows pseudonormal pattern of left ventricular myocardial filling (Grade II diastolic dysfunction).   6. Normal right ventricular size and function.   7. Mild to moderately enlarged left atrium.   8. There is no evidence of pericardial effusion.   9. Mild mitral annular calcification.  10. Mild mitral valve regurgitation.  11. Mild thickening and calcification of the anterior and posterior mitral valve leaflets.  12. Peak transmitral mean gradient equals 6.8 mmHg, calculated mitral valve area by pressure half time equals 1.86 cm² consistent withmild mitral stenosis.  13. Mild tricuspid regurgitation.  14. Mild aortic regurgitation.  15. Sclerotic aortic valve with normal opening.  16. Estimated pulmonary artery systolic pressure is 68.4 mmHg assuming a right atrial pressure of 8 mmHg, whichis consistent with severe pulmonary hypertension.  17. C/w the study of 6-21-20, more significant pulmonary htn is now noted.  18. Endocardial visualization was enhanced with intravenous echo contrast.  19. Mitral valve mean gradient is 6.8 mmHg consistent with moderate mitral stenosis.  Montana Barreto MD FACC, FASE, FAC  < end of copied text >

## 2021-01-14 NOTE — PROGRESS NOTE ADULT - ASSESSMENT
69 yo female with a PMHx of CAD, HFpEF (EF 65-60%), severe MR s/p mechanical MVR 1999 (Dr. Anderson, [facility]), severe TR s/p mechanical TVR (2012 [facility]), afib s/p ablation, HTN, HLD, CKD (bl 1.35-1.5), COPD on 3L NC at home, MIGUEL who presented to the [facility] for cp, sob, dizziness x 1 week, found to be in decompensated HFpEF, w/ tachy-ofelia rhythm. Seen by Dr. Thrasher Cardiology at [facility]. s/p IV diuresis w/ lasix BID. TTE 12/31 reviewed by [facility] Dr. Escobar, c/f possible L-R shunt, ?fistula between aortic valve and RV. Patient transferred to [facility] for ELIZABETH and escalation of care. There was no echocardiographic evidence of intracardiac shunt. The patient was noted to desaturate down to 86% while ambulating on 1/11/20 and her CXR was concerning for increased pulmonary vascular congestion so HF was called back to evaluate volume status. She currently appears mildly volume overloaded on exam and her renal function is overall stable.    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 Anderson, [facility]), severe TR s/p mechanical TVR (2012 [facility]), afib s/p ablation, HTN, HLD, CKD (bl 1.35-1.5), COPD on 3L NC at home, MIGUEL who presented to the [facility] for cp, sob, dizziness x 1 week, found to be in decompensated HFpEF, w/ tachy-ofelia rhythm. Seen by Dr. Thrasher Cardiology at [facility]. s/p IV diuresis w/ lasix BID. TTE 12/31 reviewed by [facility] Dr. Escobar, c/f possible L-R shunt, ?fistula between aortic valve and RV. Patient transferred to [facility] for ELIZABETH and escalation of care. There was no echocardiographic evidence of intracardiac shunt. The patient was noted to desaturate down to 86% while ambulating on 1/11/20 and her CXR was concerning for increased pulmonary vascular congestion so HF was called back to evaluate volume status.     Today, the patient appears euvolemic on exam. She should continue current dose of PO Bumex (2mg PO BID). From a heart failure perspective, she is optimized for discharge. We discussed the Spirit trial with the patient and she is in agreement to consent.     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 Anderson, [facility]), severe TR s/p mechanical TVR (2012 [facility]), afib s/p ablation, HTN, HLD, CKD (bl 1.35-1.5), COPD on 3L NC at home, MIGUEL who presented to the [facility] for cp, sob, dizziness x 1 week, found to be in decompensated HFpEF, w/ tachy-ofelia rhythm. Seen by Dr. Thrasher Cardiology at [facility]. s/p IV diuresis w/ lasix BID. TTE 12/31 reviewed by [facility] Dr. Escobar, c/f possible L-R shunt, ?fistula between aortic valve and RV. Patient transferred to [facility] for ELIZABETH and escalation of care. There was no echocardiographic evidence of intracardiac shunt. The patient was noted to desaturate down to 86% while ambulating on 1/11/20 and her CXR was concerning for increased pulmonary vascular congestion so HF was called back to evaluate volume status.     Today, the patient appears euvolemic on exam. She should continue current dose of PO Bumex (2mg PO BID). From a heart failure perspective, she is optimized for discharge. We discussed the SPIRRIT trial with the patient and she is in agreement to consent.     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

## 2021-01-14 NOTE — PROGRESS NOTE ADULT - PROBLEM SELECTOR PROBLEM 5
Pulmonary hypertension

## 2021-01-14 NOTE — PROGRESS NOTE ADULT - SUBJECTIVE AND OBJECTIVE BOX
Subjective:  -NAEO  -feels well    Medications:  allopurinol 100 milliGRAM(s) Oral daily  BACItracin   Ointment 1 Application(s) Topical two times a day  budesonide 160 MICROgram(s)/formoterol 4.5 MICROgram(s) Inhaler 2 Puff(s) Inhalation two times a day  buMETAnide 2 milliGRAM(s) Oral two times a day  heparin   Injectable 9000 Unit(s) IV Push every 6 hours PRN  heparin   Injectable 4000 Unit(s) IV Push every 6 hours PRN  heparin  Infusion.  Unit(s)/Hr IV Continuous <Continuous>  metoprolol succinate ER 25 milliGRAM(s) Oral daily  simvastatin 10 milliGRAM(s) Oral at bedtime  sodium chloride 0.65% Nasal 1 Spray(s) Both Nostrils two times a day PRN  warfarin 7.5 milliGRAM(s) Oral once      ICU Vital Signs Last 24 Hrs  T(C): 37.1, Max: 37.3 ( @ 21:14)  HR: 63 (56 - 63)  BP: 145/77 (120/62 - 145/77)  BP(mean): --  ABP: --  ABP(mean): --  RR: 19 (18 - 19)  SpO2: 97% (96% - 99%)    Weight in k.9 (21)  Weight in k.2 (21)      I&O's Summary Last 24 Hrs    IN: 720 mL / OUT: 2150 mL / NET: -1430 mL      Tele: sinus rhythm<>afib, HR 60s    Physical Exam:    General: No distress. Comfortable.  HEENT: EOMs intact.  Neck: Neck supple. JVP not elevated. No masses  Chest: Clear to auscultation bilaterally  CV: Irregularly irregular, Normal S1 and S2. No murmurs, rub, or gallops. Radial pulses normal.  Abdomen: Soft, non-distended, non-tender  Skin: No rashes or skin breakdown  Neurology: Alert and oriented times three. Sensation intact  Psych: Affect normal    Labs:    ( 21 @ 07:24 )               8.4    6.23  )--------( 405                  29.1     ( 21 @ 07:23 )     134  |  94  |  64  ---------------------<  91  4.5  |  33  |  1.63    Ca 10.0  Phos x   Mg x       PTT/PT/INR - ( 21 @ 07:23 )  PTT: x     / PT: 26.4 sec / INR: 2.29 ratio    ( 20 @ 22:14 )  TropHS x     / CK 44    / CKMB x        Serum Pro-Brain Natriuretic Peptide: 1336 (20 @ 16:56)             Subjective:  -NAEO  -feels well    Medications:  allopurinol 100 milliGRAM(s) Oral daily  BACItracin   Ointment 1 Application(s) Topical two times a day  budesonide 160 MICROgram(s)/formoterol 4.5 MICROgram(s) Inhaler 2 Puff(s) Inhalation two times a day  buMETAnide 2 milliGRAM(s) Oral two times a day  heparin   Injectable 9000 Unit(s) IV Push every 6 hours PRN  heparin   Injectable 4000 Unit(s) IV Push every 6 hours PRN  heparin  Infusion.  Unit(s)/Hr IV Continuous <Continuous>  metoprolol succinate ER 25 milliGRAM(s) Oral daily  simvastatin 10 milliGRAM(s) Oral at bedtime  sodium chloride 0.65% Nasal 1 Spray(s) Both Nostrils two times a day PRN  warfarin 7.5 milliGRAM(s) Oral once      ICU Vital Signs Last 24 Hrs  T(C): 37.1, Max: 37.3 ( @ 21:14)  HR: 63 (56 - 63)  BP: 145/77 (120/62 - 145/77)  BP(mean): --  ABP: --  ABP(mean): --  RR: 19 (18 - 19)  SpO2: 97% (96% - 99%)    Weight in k.9 (21)  Weight in k.2 (21)    I&O's Summary Last 24 Hrs    IN: 720 mL / OUT: 2150 mL / NET: -1430 mL    Tele: sinus rhythm<>afib, HR 60s    Physical Exam:    General: No distress. Comfortable.  HEENT: EOMs intact.  Neck: Neck supple. JVP not elevated. No masses  Chest: Clear to auscultation bilaterally  CV: Irregularly irregular, Normal S1 and S2. No murmurs, rub, or gallops. Radial pulses normal.  Abdomen: Soft, non-distended, non-tender  Skin: No rashes or skin breakdown  Neurology: Alert and oriented times three. Sensation intact  Psych: Affect normal    Labs:    ( 21 @ 07:24 )               8.4    6.23  )--------( 405                  29.1     ( 21 @ 07:23 )     134  |  94  |  64  ---------------------<  91  4.5  |  33  |  1.63    Ca 10.0  Phos x   Mg x     PTT/PT/INR - ( 21 @ 07:23 )  PTT: x     / PT: 26.4 sec / INR: 2.29 ratio    ( 20 @ 22:14 )  TropHS x     / CK 44    / CKMB x        Serum Pro-Brain Natriuretic Peptide: 1336 (20 @ 16:56)

## 2021-01-14 NOTE — PROGRESS NOTE ADULT - NUTRITIONAL ASSESSMENT
This patient has been assessed with a concern for Malnutrition and has been determined to have a diagnosis/diagnoses of Morbid obesity (BMI > 40).    This patient is being managed with:   Diet Regular-  1500mL Fluid Restriction ([id number])  Entered: Jan 6 2021  4:10PM    
This patient has been assessed with a concern for Malnutrition and has been determined to have a diagnosis/diagnoses of Morbid obesity (BMI > 40).    This patient is being managed with:   Diet Regular-  1500mL Fluid Restriction ([id number])  Entered: Jan 6 2021  4:10PM    
This patient has been assessed with a concern for Malnutrition and has been determined to have a diagnosis/diagnoses of Morbid obesity (BMI > 40).    This patient is being managed with:   Diet Regular-  1500mL Fluid Restriction ([id number])  Entered: Jan 6 2021  4:10PM    
This patient has been assessed with a concern for Malnutrition and has been determined to have a diagnosis/diagnoses of Morbid obesity (BMI > 40).    This patient is being managed with:   Diet Regular-  1500mL Fluid Restriction ([id number])  Entered: Jan 6 2021  4:10PM    
This patient has been assessed with a concern for Malnutrition and has been determined to have a diagnosis/diagnoses of Morbid obesity (BMI > 40).    This patient is being managed with:   Diet Regular-  1500mL Fluid Restriction ([id number])  Entered: Jan 6 2021  4:10PM

## 2021-01-14 NOTE — PROGRESS NOTE ADULT - PROBLEM SELECTOR PLAN 5
- [facility] 10/2020 c/f mixed post and pre-capillary pHTN   - c/w diuretics as above
[facility] 10/2020 c/f mixed post and pre-capillary pHTN   - PSAP 68.4 (severe pHTN), likely elevated iso decompensated HF  - c/w diuresis as above.
- [facility] 10/2020 c/f mixed post and pre-capillary pHTN   - c/w diuresis as above
- [facility] 10/2020 c/f mixed post and pre-capillary pHTN   - c/w diuretics as above
[facility] 10/2020 c/f mixed post and pre-capillary pHTN   - PSAP 68.4 (severe pHTN), likely elevated iso decompensated HF  - c/w diuresis as above.
- [facility] 10/2020 c/f mixed post and pre-capillary pHTN   - c/w diuretics as above

## 2021-01-15 LAB
APTT BLD: 198.2 SEC — CRITICAL HIGH (ref 27.5–35.5)
APTT BLD: >200 SEC — CRITICAL HIGH (ref 27.5–35.5)
HCT VFR BLD CALC: 28.2 % — LOW (ref 34.5–45)
HGB BLD-MCNC: 8.1 G/DL — LOW (ref 11.5–15.5)
INR BLD: 2.77 RATIO — HIGH (ref 0.88–1.16)
MCHC RBC-ENTMCNC: 25.7 PG — LOW (ref 27–34)
MCHC RBC-ENTMCNC: 28.7 GM/DL — LOW (ref 32–36)
MCV RBC AUTO: 89.5 FL — SIGNIFICANT CHANGE UP (ref 80–100)
NRBC # BLD: 0 /100 WBCS — SIGNIFICANT CHANGE UP (ref 0–0)
PLATELET # BLD AUTO: 383 K/UL — SIGNIFICANT CHANGE UP (ref 150–400)
PROTHROM AB SERPL-ACNC: 31.7 SEC — HIGH (ref 10.6–13.6)
RBC # BLD: 3.15 M/UL — LOW (ref 3.8–5.2)
RBC # FLD: 16.6 % — HIGH (ref 10.3–14.5)
WBC # BLD: 5.51 K/UL — SIGNIFICANT CHANGE UP (ref 3.8–10.5)
WBC # FLD AUTO: 5.51 K/UL — SIGNIFICANT CHANGE UP (ref 3.8–10.5)

## 2021-01-15 PROCEDURE — 99232 SBSQ HOSP IP/OBS MODERATE 35: CPT

## 2021-01-15 RX ORDER — WARFARIN SODIUM 2.5 MG/1
5 TABLET ORAL ONCE
Refills: 0 | Status: COMPLETED | OUTPATIENT
Start: 2021-01-15 | End: 2021-01-15

## 2021-01-15 RX ORDER — HEPARIN SODIUM 5000 [USP'U]/ML
1300 INJECTION INTRAVENOUS; SUBCUTANEOUS
Qty: 25000 | Refills: 0 | Status: DISCONTINUED | OUTPATIENT
Start: 2021-01-15 | End: 2021-01-15

## 2021-01-15 RX ORDER — HEPARIN SODIUM 5000 [USP'U]/ML
1500 INJECTION INTRAVENOUS; SUBCUTANEOUS
Qty: 25000 | Refills: 0 | Status: DISCONTINUED | OUTPATIENT
Start: 2021-01-15 | End: 2021-01-15

## 2021-01-15 RX ADMIN — BUDESONIDE AND FORMOTEROL FUMARATE DIHYDRATE 2 PUFF(S): 160; 4.5 AEROSOL RESPIRATORY (INHALATION) at 17:42

## 2021-01-15 RX ADMIN — SIMVASTATIN 10 MILLIGRAM(S): 20 TABLET, FILM COATED ORAL at 22:02

## 2021-01-15 RX ADMIN — Medication 25 MILLIGRAM(S): at 06:29

## 2021-01-15 RX ADMIN — HEPARIN SODIUM 1300 UNIT(S)/HR: 5000 INJECTION INTRAVENOUS; SUBCUTANEOUS at 03:54

## 2021-01-15 RX ADMIN — BUDESONIDE AND FORMOTEROL FUMARATE DIHYDRATE 2 PUFF(S): 160; 4.5 AEROSOL RESPIRATORY (INHALATION) at 06:29

## 2021-01-15 RX ADMIN — Medication 100 MILLIGRAM(S): at 17:42

## 2021-01-15 RX ADMIN — BUMETANIDE 2 MILLIGRAM(S): 0.25 INJECTION INTRAMUSCULAR; INTRAVENOUS at 06:29

## 2021-01-15 RX ADMIN — Medication 1 APPLICATION(S): at 17:42

## 2021-01-15 RX ADMIN — BUMETANIDE 2 MILLIGRAM(S): 0.25 INJECTION INTRAMUSCULAR; INTRAVENOUS at 17:42

## 2021-01-15 RX ADMIN — Medication 1 APPLICATION(S): at 06:29

## 2021-01-15 RX ADMIN — WARFARIN SODIUM 5 MILLIGRAM(S): 2.5 TABLET ORAL at 22:02

## 2021-01-15 RX ADMIN — HEPARIN SODIUM 0 UNIT(S)/HR: 5000 INJECTION INTRAVENOUS; SUBCUTANEOUS at 02:54

## 2021-01-15 NOTE — PROVIDER CONTACT NOTE (CRITICAL VALUE NOTIFICATION) - ASSESSMENT
Patient has no s/s of bleeding
VSS no s/s of  acute bleeding/ AMS
Patient is alert and oriented x 4. No complaints of pain, discomfort, lightheadedness, dizziness, SOB. No signs or symptoms of bleeding, VSS.
VSS no s/s of AMS/SOB

## 2021-01-15 NOTE — PROGRESS NOTE ADULT - SUBJECTIVE AND OBJECTIVE BOX
PULMONARY PROGRESS NOTE    DAMARI GUERRERO  MRN-[id number]    Patient is a 70y old  Female who presents with a chief complaint of chf/ chf team (15 Ruddy 2021 07:18)      HPI:  she is on 2L  tolerating BPAP  feels better on BPAP    ROS:   -    ACTIVE MEDICATION LIST:  MEDICATIONS  (STANDING):  allopurinol 100 milliGRAM(s) Oral daily  BACItracin   Ointment 1 Application(s) Topical two times a day  budesonide 160 MICROgram(s)/formoterol 4.5 MICROgram(s) Inhaler 2 Puff(s) Inhalation two times a day  buMETAnide 2 milliGRAM(s) Oral two times a day  metoprolol succinate ER 25 milliGRAM(s) Oral daily  simvastatin 10 milliGRAM(s) Oral at bedtime  warfarin 5 milliGRAM(s) Oral once    MEDICATIONS  (PRN):  sodium chloride 0.65% Nasal 1 Spray(s) Both Nostrils two times a day PRN Nasal Congestion      EXAM:  Vital Signs Last 24 Hrs  T(C): 37.1 (15 Ruddy 2021 06:19), Max: 37.1 (14 Jan 2021 21:13)  T(F): 98.7 (15 Ruddy 2021 06:19), Max: 98.7 (14 Jan 2021 21:13)  HR: 71 (15 Ruddy 2021 11:03) (54 - 71)  BP: 117/72 (15 Ruddy 2021 06:19) (117/72 - 136/76)  BP(mean): --  RR: 18 (15 Ruddy 2021 06:19) (18 - 19)  SpO2: 95% (15 Ruddy 2021 11:03) (95% - 100%)    GENERAL: The patient is awake and alert in no apparent distress.     LUNGS: Clear to auscultation without wheezing, rales or rhonchi; respirations unlabored    HEART: Regular rate and rhythm without murmur.                            8.1    5.51  )-----------( 383      ( 15 Ruddy 2021 09:32 )             28.2       01-14    134<L>  |  94<L>  |  64<H>  ----------------------------<  91  4.5   |  33<H>  |  1.63<H>    Ca    10.0      14 Jan 2021 07:23       < from: Xray Chest 1 View- PORTABLE-Urgent (Xray Chest 1 View- PORTABLE-Urgent .) (01.11.21 @ 17:43) >    EXAM:  XR CHEST PORTABLE URGENT 1V                            PROCEDURE DATE:  01/11/2021            INTERPRETATION:  TIME OF EXAM: January 11, 2021 at 5:38 PM.    CLINICAL INFORMATION: COPD. Desaturation on ambulation.    COMPARISON:  December 29,2020.    TECHNIQUE:   AP Portable chest x-ray. Limited by rotation. The chin obscures part of the upper image.    INTERPRETATION:    Heart size and the mediastinum cannot be accurately evaluated on this projection. Median sternotomy sutures, surgicalclips, and prosthetic cardiac valve again seen.  Elevated left hemidiaphragm again seen.  No definite focal lung consolidation seen.  There is continued accentuation of the central pulmonary vascular markings suggesting pulmonary vascular congestion.  No pleural effusion or pneumothorax noted.        IMPRESSION:  Elevated left hemidiaphragm again seen.    Continued accentuation of central pulmonary vascular markings suggesting pulmonary vascular congestion.                ANGELIQUE MICHELLE MD; Attending Radiologist  This document has been electronically signed. Jan 12 2021  9:52AM    < end of copied text >      PROBLEM LIST:  70y Female with HEALTH ISSUES - PROBLEM Dx:  Epistaxis  Epistaxis    Chronic kidney disease (CKD)  Chronic kidney disease (CKD)    Tricuspid valve replaced  Tricuspid valve replaced    Pulmonary hypertension  Pulmonary hypertension    COPD (chronic obstructive pulmonary disease)  COPD (chronic obstructive pulmonary disease)    Mitral valve replaced  Mitral valve replaced    Chronic atrial fibrillation  Chronic atrial fibrillation    Heart failure with preserved ejection fraction  Heart failure with preserved ejection fraction           RECS:  need NIVV at home  needs to f/u for outpatient pfts & sleep study  diuretics per cardio  f/u CTS re; MEMS  continue inhalers until we have formal pfts  suggest trial of room air during day at rest    Please call with any questions.    Irma Eaton, DO  [facility] Pulmonary/Sleep Medicine  [phone]

## 2021-01-15 NOTE — CHART NOTE - NSCHARTNOTEFT_GEN_A_CORE
Pt with  COPD and acute on chronic respiratory failure will require non invasive ventilation with avaps-ae. Despite BIPAP use , C02 on ABG remains high according to the results . With out non invasive ventillation , Pt will continue to have high Pco2 levels  and possible readmission .  Elva pavon NP-C [id number]

## 2021-01-15 NOTE — PROGRESS NOTE ADULT - ASSESSMENT
70 years      h/o   CAD,  mechanical MVR and Tricuspid valve repair,        h/o  chronic   HFpEF,   HTN  . MIGUEL ,     severe MR  ,  s/p mechanical MVR 1999 , [facility] with Dr. Anderson,  and severe TR s/p TVR 2012 ([facility]),       COPD ,on home 02; no prior tobacco use      AFib on Coumadin , failed  ablation,   ,  PHTN,   CKD 3 (b/l SCr ~1.3-1.6),  MIGUEL and gout.     h/o   hypercarbia and she reports  needing Bipap,in the past,  but hasn't used in 10 years.      Morbid  obesity,  BMI is 41     presented with  CP,  dizziness and sob     She also had epistaxis at [facility],/         1.,  Atrial fibrillation       metoprolol ER 25 mg  / lipitor     2.  MVR  mechanical,1999   was  on  IV heparin and warfarin with goal INR of 2.5-3.5 with mechanical MVR   daily INR   3.  TVR, 2012 at [facility]   4.  HTN, on Toprol   5. Dizziness , since resolved  Ct head , with infarcts, Right b. ganglia and Right cerebellum   6. .  Anemia,  has  c/c  anemia, , baseline is  8  to 9/   last colonoscopy  was  10 yrs  ago/ s/p melena last yr. and, no w/p was done.,as  pt was  deemed high risk  for  any procedure  7.  Acute  Diastolic  Chf    was on  iv bumex bid       daily weights/ follow   I/O//   still  with sob on exertion  8.  Severe Pulm Htn, on echo  Echo  12/20,normal  ef/mod  MS/  severe Pulm Htn  per card dr mcnamara , pt   transferred to  [place]   for ELIZABETH/ ? fistula  from aorta  to RV/     s/p epistaxis  seen by ent  heart  failure team following pt / has lost 3  kg  ELIZABETH,  cancelled  per  floor  by echo dept/  spoke  with  chf   Dr alba from 1/8.  MVR/  decreased  RV function/ no pfo/vsd  pe  ENT, packing removal on 1/12  Mechanical MVR./  on coumadin  on exertion on 1/11, pt with mild  sob and hypoxia  in high  80's. cxr with chf  . bumex  increased  to 2 mg bid/  pt ha slost 4 Kg   INR  daily  pulm to  set  up  nocturnal  Bpap/  MIGUEL   on  bridging iv heparin / dose  lowered  by  Acp/  INR is  pending       < from: Transthoracic Echocardiogram (01.08.21 @ 15:53) >  Conclusions:  1. Mechanical prosthetic mitral valve replacement. Peak  mitral valve gradient equals 15 mm Hg, mean transmitral  valve gradient equals 6 mm Hg, which is elevated even in  the setting of a mechanical prosthetic mitral valve  replacement (Heart rate 69 bpm).  2. Refer to prior echos for assesssment of LV function.  Septal motion consistent with cardiac surgery, right  ventricular overload.  3. Right atrial enlargement.  4. Right ventricular enlargement with decreased right  ventricular systolic function.  5. An annuloplasty ring is seen in the tricuspid position.  PG 3 mm hg, MG 1 mm hg. No tricuspid regurgitation.  6. Agitated saline injection demonstrates evidence of a  patent foramen ovale/VSD.  No color Doppler evidence of  < end of copied text >     < from: CT Head No Cont (12.29.20 @ 20:41)   IMPRESSION: No acute intracranial hemorrhage. Age-indeterminate right basal ganglia lacunar infarct and right cerebellar lacunar infarct. If there is clinical suspicion for acute infarct, consider brain MRI if there is no contraindication.  < end of copied text >    < from: TTE Echo Complete w/ Contrast w/ Doppler (12.31.20 @ 14:01) >  ummary:   1. Left ventricular ejection fraction, by visual estimation, is 60 to 65%.   2. Technically suboptimal study.   3. Normal global left ventricular systolic function.   4. Normal left ventricular internal cavity size.   5. Spectral Doppler shows pseudonormal pattern of left ventricular myocardial filling (Grade II diastolic dysfunction).   6. Normal right ventricular size and function.   7. Mild to moderately enlarged left atrium.   8. There is no evidence of pericardial effusion.   9. Mild mitral annular calcification.  10. Mild mitral valve regurgitation.  11. Mild thickening and calcification of the anterior and posterior mitral valve leaflets.  12. Peak transmitral mean gradient equals 6.8 mmHg, calculated mitral valve area by pressure half time equals 1.86 cm² consistent withmild mitral stenosis.  13. Mild tricuspid regurgitation.  14. Mild aortic regurgitation.  15. Sclerotic aortic valve with normal opening.  16. Estimated pulmonary artery systolic pressure is 68.4 mmHg assuming a right atrial pressure of 8 mmHg, whichis consistent with severe pulmonary hypertension.  17. C/w the study of 6-21-20, more significant pulmonary htn is now noted.  18. Endocardial visualization was enhanced with intravenous echo contrast.  19. Mitral valve mean gradient is 6.8 mmHg consistent with moderate mitral stenosis.  Montana Barreto MD FACC, FASE, FAC  < end of copied text >                	   70 years      h/o   CAD,  mechanical MVR and Tricuspid valve repair,        h/o  chronic   HFpEF,   HTN  . MIGUEL ,     severe MR  ,  s/p mechanical MVR 1999 , [facility] with Dr. Anderson,  and severe TR s/p TVR 2012 ([facility]),       COPD ,on home 02; no prior tobacco use      AFib on Coumadin , failed  ablation,   ,  PHTN,   CKD 3 (b/l SCr ~1.3-1.6),  MIGUEL and gout.     h/o   hypercarbia and she reports  needing Bipap,in the past,  but hasn't used in 10 years.      Morbid  obesity,  BMI is 41     presented with  CP,  dizziness and sob     She also had epistaxis at [facility],/         1.,  Atrial fibrillation       metoprolol ER 25 mg  / lipitor     2.  MVR  mechanical,1999   was  on  IV heparin and warfarin with goal INR of 2.5-3.5 with mechanical MVR   daily INR   3.  TVR, 2012 at [facility]   4.  HTN, on Toprol   5. Dizziness , since resolved  Ct head , with infarcts, Right b. ganglia and Right cerebellum   6. .  Anemia,  has  c/c  anemia, , baseline is  8  to 9/   last colonoscopy  was  10 yrs  ago/ s/p melena last yr. and, no w/p was done.,as  pt was  deemed high risk  for  any procedure  7.  Acute  Diastolic  Chf    was on  iv bumex bid       daily weights/ follow   I/O//   still  with sob on exertion  8.  Severe Pulm Htn, on echo  Echo  12/20,normal  ef/mod  MS/  severe Pulm Htn  per card dr mcnamara , pt   transferred to  [place]   for ELIZABETH/ ? fistula  from aorta  to RV/     s/p epistaxis  seen by ent  heart  failure team following pt / has lost 3  kg  ELIZABETH,  cancelled  per  floor  by echo dept/  spoke  with  chf   Dr alba from 1/8.  MVR/  decreased  RV function/ no pfo/vsd  pe  ENT, packing removal on 1/12  Mechanical MVR./  on coumadin  on exertion on 1/11, pt with mild  sob and hypoxia  in high  80's. cxr with chf  . bumex  increased  to 2 mg bid/  pt has lost 4 Kg   INR  daily  pulm to  set  up  nocturnal  Bpap/  MIGUEL   on  bridging iv heparin / dose  lowered  by  Acp/  INR is  pending  addendum, inr is 2,7    on home dose 5 mg   d/c  planning    f/p  with pmd/ dr springera/  check inr,  next week       < from: Transthoracic Echocardiogram (01.08.21 @ 15:53) >  Conclusions:  1. Mechanical prosthetic mitral valve replacement. Peak  mitral valve gradient equals 15 mm Hg, mean transmitral  valve gradient equals 6 mm Hg, which is elevated even in  the setting of a mechanical prosthetic mitral valve  replacement (Heart rate 69 bpm).  2. Refer to prior echos for assesssment of LV function.  Septal motion consistent with cardiac surgery, right  ventricular overload.  3. Right atrial enlargement.  4. Right ventricular enlargement with decreased right  ventricular systolic function.  5. An annuloplasty ring is seen in the tricuspid position.  PG 3 mm hg, MG 1 mm hg. No tricuspid regurgitation.  6. Agitated saline injection demonstrates evidence of a  patent foramen ovale/VSD.  No color Doppler evidence of  < end of copied text >     < from: CT Head No Cont (12.29.20 @ 20:41)   IMPRESSION: No acute intracranial hemorrhage. Age-indeterminate right basal ganglia lacunar infarct and right cerebellar lacunar infarct. If there is clinical suspicion for acute infarct, consider brain MRI if there is no contraindication.  < end of copied text >    < from: TTE Echo Complete w/ Contrast w/ Doppler (12.31.20 @ 14:01) >  ummary:   1. Left ventricular ejection fraction, by visual estimation, is 60 to 65%.   2. Technically suboptimal study.   3. Normal global left ventricular systolic function.   4. Normal left ventricular internal cavity size.   5. Spectral Doppler shows pseudonormal pattern of left ventricular myocardial filling (Grade II diastolic dysfunction).   6. Normal right ventricular size and function.   7. Mild to moderately enlarged left atrium.   8. There is no evidence of pericardial effusion.   9. Mild mitral annular calcification.  10. Mild mitral valve regurgitation.  11. Mild thickening and calcification of the anterior and posterior mitral valve leaflets.  12. Peak transmitral mean gradient equals 6.8 mmHg, calculated mitral valve area by pressure half time equals 1.86 cm² consistent withmild mitral stenosis.  13. Mild tricuspid regurgitation.  14. Mild aortic regurgitation.  15. Sclerotic aortic valve with normal opening.  16. Estimated pulmonary artery systolic pressure is 68.4 mmHg assuming a right atrial pressure of 8 mmHg, whichis consistent with severe pulmonary hypertension.  17. C/w the study of 6-21-20, more significant pulmonary htn is now noted.  18. Endocardial visualization was enhanced with intravenous echo contrast.  19. Mitral valve mean gradient is 6.8 mmHg consistent with moderate mitral stenosis.  Montana Barreto MD FACC, ANGELA, FAC  < end of copied text >

## 2021-01-15 NOTE — PROVIDER CONTACT NOTE (CRITICAL VALUE NOTIFICATION) - ACTION/TREATMENT ORDERED:
Heparin gtt discontinued.
continue to monitor
PA made aware, follow nomogram: hold heparin for 1 hr and restart at 13cc/hr. will continue to monitor patient and notify PA is patient becomes symptomatic.
follow heparin nomogram

## 2021-01-15 NOTE — PROGRESS NOTE ADULT - SUBJECTIVE AND OBJECTIVE BOX
afebrile    REVIEW OF SYSTEMS:  GEN: no fever,    no chills  RESP: no SOB,   no cough  CVS: no chest pain,   no palpitations  GI: no abdominal pain,   no nausea,   no vomiting,   no constipation,   no diarrhea  : no dysuria,   no frequency  NEURO: no headache,   no dizziness  PSYCH: no depression,   not anxious  Derm : no rash    MEDICATIONS  (STANDING):  allopurinol 100 milliGRAM(s) Oral daily  BACItracin   Ointment 1 Application(s) Topical two times a day  budesonide 160 MICROgram(s)/formoterol 4.5 MICROgram(s) Inhaler 2 Puff(s) Inhalation two times a day  buMETAnide 2 milliGRAM(s) Oral two times a day  heparin  Infusion.  Unit(s)/Hr (19 mL/Hr) IV Continuous <Continuous>  metoprolol succinate ER 25 milliGRAM(s) Oral daily  simvastatin 10 milliGRAM(s) Oral at bedtime    MEDICATIONS  (PRN):  heparin   Injectable 9000 Unit(s) IV Push every 6 hours PRN For aPTT less than 40  heparin   Injectable 4000 Unit(s) IV Push every 6 hours PRN For aPTT between 40 - 57  sodium chloride 0.65% Nasal 1 Spray(s) Both Nostrils two times a day PRN Nasal Congestion      Vital Signs Last 24 Hrs  T(C): 37.1 (15 Ruddy 2021 06:19), Max: 37.1 (14 Jan 2021 14:06)  T(F): 98.7 (15 Ruddy 2021 06:19), Max: 98.8 (14 Jan 2021 14:06)  HR: 54 (15 Ruddy 2021 06:51) (54 - 66)  BP: 117/72 (15 Ruddy 2021 06:19) (117/72 - 145/77)  BP(mean): --  RR: 18 (15 Ruddy 2021 06:19) (18 - 19)  SpO2: 97% (15 Ruddy 2021 06:51) (97% - 100%)  CAPILLARY BLOOD GLUCOSE        I&O's Summary    14 Jan 2021 07:01  -  15 Ruddy 2021 07:00  --------------------------------------------------------  IN: 998 mL / OUT: 700 mL / NET: 298 mL        PHYSICAL EXAM:  HEAD:  Atraumatic, Normocephalic  NECK: Supple, No   JVD  CHEST/LUNG:   nono     rales,     no,    rhonchi  HEART: Regular rate and rhythm;         murmur  ABDOMEN: Soft, Nontender, ;   EXTREMITIES:        edema  NEUROLOGY:  alert    LABS:                        8.8    5.75  )-----------( 399      ( 14 Jan 2021 15:44 )             31.1     01-14    134<L>  |  94<L>  |  64<H>  ----------------------------<  91  4.5   |  33<H>  |  1.63<H>    Ca    10.0      14 Jan 2021 07:23      PT/INR - ( 14 Jan 2021 07:23 )   PT: 26.4 sec;   INR: 2.29 ratio         PTT - ( 15 Ruddy 2021 01:44 )  PTT:>200.0 sec          ABG - ( 14 Jan 2021 15:15 )  pH, Arterial: 7.40  pH, Blood: x     /  pCO2: 59    /  pO2: 35    / HCO3: 36    / Base Excess: 9.7   /  SaO2: 57                    Thyroid Stimulating Hormone, Serum: 1.630 uIU/mL (01-02 @ 06:57)          Consultant(s) Notes Reviewed:      Care Discussed with Consultants/Other Providers:

## 2021-01-15 NOTE — CHART NOTE - NSCHARTNOTEFT_GEN_A_CORE
Pt in acute on chronic respiratory failure  who will need Trilogy Bipap  for discharge .  Pt in agreement for the use of this device   Elva pavon [id number]

## 2021-01-15 NOTE — PROVIDER CONTACT NOTE (CRITICAL VALUE NOTIFICATION) - BACKGROUND
Pt admitted for CHF  Hx: HTN, HLD, COPD, mitral and tricuspid valve replacement, AFIB
admitted with HF, afib on heparin gtt
Patient admitting diagnosis of chest pain. History of CHF, HTN, COPD, MIGUEL, afib, valve replacement.
admitted w/ heart failure on droplet for covid exposure

## 2021-01-16 LAB
HCT VFR BLD CALC: 30.5 % — LOW (ref 34.5–45)
HGB BLD-MCNC: 8.7 G/DL — LOW (ref 11.5–15.5)
INR BLD: 2.58 RATIO — HIGH (ref 0.88–1.16)
MCHC RBC-ENTMCNC: 25.6 PG — LOW (ref 27–34)
MCHC RBC-ENTMCNC: 28.5 GM/DL — LOW (ref 32–36)
MCV RBC AUTO: 89.7 FL — SIGNIFICANT CHANGE UP (ref 80–100)
NRBC # BLD: 0 /100 WBCS — SIGNIFICANT CHANGE UP (ref 0–0)
PLATELET # BLD AUTO: 393 K/UL — SIGNIFICANT CHANGE UP (ref 150–400)
PROTHROM AB SERPL-ACNC: 29.6 SEC — HIGH (ref 10.6–13.6)
RBC # BLD: 3.4 M/UL — LOW (ref 3.8–5.2)
RBC # FLD: 16.6 % — HIGH (ref 10.3–14.5)
WBC # BLD: 5.89 K/UL — SIGNIFICANT CHANGE UP (ref 3.8–10.5)
WBC # FLD AUTO: 5.89 K/UL — SIGNIFICANT CHANGE UP (ref 3.8–10.5)

## 2021-01-16 PROCEDURE — 99232 SBSQ HOSP IP/OBS MODERATE 35: CPT

## 2021-01-16 RX ORDER — ONDANSETRON 8 MG/1
4 TABLET, FILM COATED ORAL ONCE
Refills: 0 | Status: COMPLETED | OUTPATIENT
Start: 2021-01-16 | End: 2021-01-16

## 2021-01-16 RX ORDER — WARFARIN SODIUM 2.5 MG/1
7.5 TABLET ORAL ONCE
Refills: 0 | Status: COMPLETED | OUTPATIENT
Start: 2021-01-16 | End: 2021-01-16

## 2021-01-16 RX ADMIN — SIMVASTATIN 10 MILLIGRAM(S): 20 TABLET, FILM COATED ORAL at 21:16

## 2021-01-16 RX ADMIN — Medication 25 MILLIGRAM(S): at 06:25

## 2021-01-16 RX ADMIN — BUDESONIDE AND FORMOTEROL FUMARATE DIHYDRATE 2 PUFF(S): 160; 4.5 AEROSOL RESPIRATORY (INHALATION) at 17:13

## 2021-01-16 RX ADMIN — WARFARIN SODIUM 7.5 MILLIGRAM(S): 2.5 TABLET ORAL at 21:16

## 2021-01-16 RX ADMIN — Medication 100 MILLIGRAM(S): at 17:11

## 2021-01-16 RX ADMIN — ONDANSETRON 4 MILLIGRAM(S): 8 TABLET, FILM COATED ORAL at 13:24

## 2021-01-16 RX ADMIN — Medication 1 APPLICATION(S): at 06:25

## 2021-01-16 RX ADMIN — BUDESONIDE AND FORMOTEROL FUMARATE DIHYDRATE 2 PUFF(S): 160; 4.5 AEROSOL RESPIRATORY (INHALATION) at 06:25

## 2021-01-16 RX ADMIN — Medication 1 APPLICATION(S): at 17:00

## 2021-01-16 RX ADMIN — BUMETANIDE 2 MILLIGRAM(S): 0.25 INJECTION INTRAMUSCULAR; INTRAVENOUS at 17:10

## 2021-01-16 RX ADMIN — BUMETANIDE 2 MILLIGRAM(S): 0.25 INJECTION INTRAMUSCULAR; INTRAVENOUS at 06:25

## 2021-01-16 NOTE — PROGRESS NOTE ADULT - SUBJECTIVE AND OBJECTIVE BOX
doing better/  no  sob  at  rest    REVIEW OF SYSTEMS:  GEN: no fever,    no chills  RESP: no SOB,   no cough  CVS: no chest pain,   no palpitations  GI: no abdominal pain,   no nausea,   no vomiting,   no constipation,   no diarrhea  : no dysuria,   no frequency  NEURO: no headache,   no dizziness  PSYCH: no depression,   not anxious  Derm : no rash    MEDICATIONS  (STANDING):  allopurinol 100 milliGRAM(s) Oral daily  BACItracin   Ointment 1 Application(s) Topical two times a day  budesonide 160 MICROgram(s)/formoterol 4.5 MICROgram(s) Inhaler 2 Puff(s) Inhalation two times a day  buMETAnide 2 milliGRAM(s) Oral two times a day  metoprolol succinate ER 25 milliGRAM(s) Oral daily  simvastatin 10 milliGRAM(s) Oral at bedtime    MEDICATIONS  (PRN):  sodium chloride 0.65% Nasal 1 Spray(s) Both Nostrils two times a day PRN Nasal Congestion      Vital Signs Last 24 Hrs  T(C): 36.3 (16 Jan 2021 04:56), Max: 36.4 (15 Ruddy 2021 12:30)  T(F): 97.4 (16 Jan 2021 04:56), Max: 97.5 (15 Ruddy 2021 12:30)  HR: 65 (16 Jan 2021 05:52) (62 - 71)  BP: 122/74 (16 Jan 2021 04:56) (122/74 - 128/68)  BP(mean): --  RR: 18 (16 Jan 2021 04:56) (18 - 18)  SpO2: 98% (16 Jan 2021 04:56) (95% - 99%)  CAPILLARY BLOOD GLUCOSE        I&O's Summary    15 Ruddy 2021 07:01  -  16 Jan 2021 07:00  --------------------------------------------------------  IN: 905 mL / OUT: 1700 mL / NET: -795 mL        PHYSICAL EXAM:  HEAD:  Atraumatic, Normocephalic  NECK: Supple, No   JVD  CHEST/LUNG:   no     rales,     no,    rhonchi  HEART: Regular rate and rhythm;         murmur  ABDOMEN: Soft, Nontender, ;   EXTREMITIES:    no    edema  NEUROLOGY:  alert    LABS:                        8.1    5.51  )-----------( 383      ( 15 Ruddy 2021 09:32 )             28.2           PT/INR - ( 15 Ruddy 2021 09:32 )   PT: 31.7 sec;   INR: 2.77 ratio         PTT - ( 15 Ruddy 2021 09:32 )  PTT:198.2 sec          ABG - ( 14 Jan 2021 15:15 )  pH, Arterial: 7.40  pH, Blood: x     /  pCO2: 59    /  pO2: 35    / HCO3: 36    / Base Excess: 9.7   /  SaO2: 57                    Thyroid Stimulating Hormone, Serum: 1.630 uIU/mL (01-02 @ 06:57)          Consultant(s) Notes Reviewed:      Care Discussed with Consultants/Other Providers:

## 2021-01-16 NOTE — PROGRESS NOTE ADULT - SUBJECTIVE AND OBJECTIVE BOX
PULMONARY PROGRESS NOTE    DAMARI GUERRERO  MRN-[id number]    Patient is a 70y old  Female who presents with a chief complaint of chf/ chf team (15 Ruddy 2021 07:18)      HPI:  feeling well  no complaints  wants to go home    ROS:   -    MEDICATIONS  (STANDING):  allopurinol 100 milliGRAM(s) Oral daily  BACItracin   Ointment 1 Application(s) Topical two times a day  budesonide 160 MICROgram(s)/formoterol 4.5 MICROgram(s) Inhaler 2 Puff(s) Inhalation two times a day  buMETAnide 2 milliGRAM(s) Oral two times a day  metoprolol succinate ER 25 milliGRAM(s) Oral daily  simvastatin 10 milliGRAM(s) Oral at bedtime  warfarin 7.5 milliGRAM(s) Oral once    MEDICATIONS  (PRN):  sodium chloride 0.65% Nasal 1 Spray(s) Both Nostrils two times a day PRN Nasal Congestion        EXAM:  Vital Signs Last 24 Hrs  T(C): 36.3 (16 Jan 2021 04:56), Max: 36.4 (15 Ruddy 2021 12:30)  T(F): 97.4 (16 Jan 2021 04:56), Max: 97.5 (15 Ruddy 2021 12:30)  HR: 59 (16 Jan 2021 09:53) (59 - 65)  BP: 122/74 (16 Jan 2021 04:56) (122/74 - 128/68)  BP(mean): --  RR: 18 (16 Jan 2021 04:56) (18 - 18)  SpO2: 95% (16 Jan 2021 09:53) (95% - 99%)    GENERAL: The patient is awake and alert in no apparent distress.     LUNGS: Clear to auscultation without wheezing, rales or rhonchi; respirations unlabored                                           8.7    5.89  )-----------( 393      ( 16 Jan 2021 07:44 )             30.5       ABG - ( 14 Jan 2021 15:15 )  pH, Arterial: 7.40  pH, Blood: x     /  pCO2: 59    /  pO2: 35    / HCO3: 36    / Base Excess: 9.7   /  SaO2: 57             < from: Xray Chest 1 View- PORTABLE-Urgent (Xray Chest 1 View- PORTABLE-Urgent .) (01.11.21 @ 17:43) >    EXAM:  XR CHEST PORTABLE URGENT 1V                            PROCEDURE DATE:  01/11/2021            INTERPRETATION:  TIME OF EXAM: January 11, 2021 at 5:38 PM.    CLINICAL INFORMATION: COPD. Desaturation on ambulation.    COMPARISON:  December 29,2020.    TECHNIQUE:   AP Portable chest x-ray. Limited by rotation. The chin obscures part of the upper image.    INTERPRETATION:    Heart size and the mediastinum cannot be accurately evaluated on this projection. Median sternotomy sutures, surgicalclips, and prosthetic cardiac valve again seen.  Elevated left hemidiaphragm again seen.  No definite focal lung consolidation seen.  There is continued accentuation of the central pulmonary vascular markings suggesting pulmonary vascular congestion.  No pleural effusion or pneumothorax noted.        IMPRESSION:  Elevated left hemidiaphragm again seen.    Continued accentuation of central pulmonary vascular markings suggesting pulmonary vascular congestion.                ANGELIQUE MICHELLE MD; Attending Radiologist  This document has been electronically signed. Jan 12 2021  9:52AM    < end of copied text >      PROBLEM LIST:  70y Female with HEALTH ISSUES - PROBLEM Dx:  Epistaxis  Epistaxis    Chronic kidney disease (CKD)  Chronic kidney disease (CKD)    Tricuspid valve replaced  Tricuspid valve replaced    Pulmonary hypertension  Pulmonary hypertension    COPD (chronic obstructive pulmonary disease)  COPD (chronic obstructive pulmonary disease)    Mitral valve replaced  Mitral valve replaced    Chronic atrial fibrillation  Chronic atrial fibrillation    Heart failure with preserved ejection fraction  Heart failure with preserved ejection fraction           RECS:  -MIGUEL/OHV, chronic resp failure with hypercapnia and hypoxia, failed bipap as persistently hypercapnic.  Patient will require non invasive ventilation at home (trilogy).  If not covered by insurance patient will need to follow up with  as outpt for formal PFT/repeat sleep study.  Patient reports last sleep study was about 6 yrs ago--we do not have these records.  diuretics per cardio  f/u CTS re; MEMS  continue inhalers  supplemental O2 PRN, do not aim for 100% O2 sat given hypercarbia    Please call with any questions.    Em Merida MD  [facility] Pulmonary/Sleep Medicine  [phone]

## 2021-01-16 NOTE — PROGRESS NOTE ADULT - ASSESSMENT
70 years      h/o   CAD,  mechanical MVR and Tricuspid valve repair,        h/o  chronic   HFpEF,   HTN  . MIGUEL ,     severe MR  ,  s/p mechanical MVR 1999 , [facility] with Dr. Anderson,  and severe TR s/p TVR 2012 ([facility]),       COPD ,on home 02; no prior tobacco use      AFib on Coumadin , failed  ablation,   ,  PHTN,   CKD 3 (b/l SCr ~1.3-1.6),  MIGUEL and gout.     h/o   hypercarbia and she reports  needing Bipap,in the past,  but hasn't used in 10 years.      Morbid  obesity,  BMI is 41     presented with  CP,  dizziness and sob     She also had epistaxis at [facility],/         1.,  Atrial fibrillation       metoprolol ER 25 mg  / lipitor     2.  MVR  mechanical,1999   was  on  IV heparin and warfarin with goal INR of 2.5-3.5 with mechanical MVR   daily INR   3.  TVR, 2012 at [facility]   4.  HTN, on Toprol   5. Dizziness , since resolved  Ct head , with infarcts, Right b. ganglia and Right cerebellum   6. .  Anemia,  has  c/c  anemia, , baseline is  8  to 9/   last colonoscopy  was  10 yrs  ago/ s/p melena last yr. and, no w/p was done.,as  pt was  deemed high risk  for  any procedure  7.  Acute  Diastolic  Chf    was on  iv bumex bid       daily weights/ follow   I/O//   still  with sob on exertion  8.  Severe Pulm Htn, on echo  Echo  12/20,normal  ef/mod  MS/  severe Pulm Htn  per card dr mcnamara , pt   transferred to  [place]   for ELIZABETH/ ? fistula  from aorta  to RV/     s/p epistaxis  seen by ent  heart  failure team following pt / has lost 3  kg  ELIZABETH,  cancelled  per  floor  by echo dept/  spoke  with  chf   Dr alba from 1/8.  MVR/  decreased  RV function/ no pfo/vsd  pe  ENT, packing removal on 1/12  Mechanical MVR./  on coumadin  on exertion on 1/11, pt with mild  sob and hypoxia  in high  80's. cxr with chf  . bumex  increased  to 2 mg bid/  pt has lost 4 Kg   INR  daily  pulm to  set  up  nocturnal  Awaps  trilogy/ MIGUEL  inr  is  pending     d/c  ,pending  procerement  of trilogy, unclear  if  pt  needs  sleep  study  again/ pulm  to  f/p    f/p  with pmd/ dr gomez/  check inr,  next week       < from: Transthoracic Echocardiogram (01.08.21 @ 15:53) >  Conclusions:  1. Mechanical prosthetic mitral valve replacement. Peak  mitral valve gradient equals 15 mm Hg, mean transmitral  valve gradient equals 6 mm Hg, which is elevated even in  the setting of a mechanical prosthetic mitral valve  replacement (Heart rate 69 bpm).  2. Refer to prior echos for assesssment of LV function.  Septal motion consistent with cardiac surgery, right  ventricular overload.  3. Right atrial enlargement.  4. Right ventricular enlargement with decreased right  ventricular systolic function.  5. An annuloplasty ring is seen in the tricuspid position.  PG 3 mm hg, MG 1 mm hg. No tricuspid regurgitation.  6. Agitated saline injection demonstrates evidence of a  patent foramen ovale/VSD.  No color Doppler evidence of  < end of copied text >     < from: CT Head No Cont (12.29.20 @ 20:41)   IMPRESSION: No acute intracranial hemorrhage. Age-indeterminate right basal ganglia lacunar infarct and right cerebellar lacunar infarct. If there is clinical suspicion for acute infarct, consider brain MRI if there is no contraindication.  < end of copied text >    < from: TTE Echo Complete w/ Contrast w/ Doppler (12.31.20 @ 14:01) >  ummary:   1. Left ventricular ejection fraction, by visual estimation, is 60 to 65%.   2. Technically suboptimal study.   3. Normal global left ventricular systolic function.   4. Normal left ventricular internal cavity size.   5. Spectral Doppler shows pseudonormal pattern of left ventricular myocardial filling (Grade II diastolic dysfunction).   6. Normal right ventricular size and function.   7. Mild to moderately enlarged left atrium.   8. There is no evidence of pericardial effusion.   9. Mild mitral annular calcification.  10. Mild mitral valve regurgitation.  11. Mild thickening and calcification of the anterior and posterior mitral valve leaflets.  12. Peak transmitral mean gradient equals 6.8 mmHg, calculated mitral valve area by pressure half time equals 1.86 cm² consistent withmild mitral stenosis.  13. Mild tricuspid regurgitation.  14. Mild aortic regurgitation.  15. Sclerotic aortic valve with normal opening.  16. Estimated pulmonary artery systolic pressure is 68.4 mmHg assuming a right atrial pressure of 8 mmHg, whichis consistent with severe pulmonary hypertension.  17. C/w the study of 6-21-20, more significant pulmonary htn is now noted.  18. Endocardial visualization was enhanced with intravenous echo contrast.  19. Mitral valve mean gradient is 6.8 mmHg consistent with moderate mitral stenosis.  Montana Barreto MD FACC, ANGELA, FAC  < end of copied text >                	   70 years      h/o   CAD,  mechanical MVR and Tricuspid valve repair,        h/o  chronic   HFpEF,   HTN  . MIGUEL ,     severe MR  ,  s/p mechanical MVR 1999 , [facility] with Dr. Anderson,  and severe TR s/p TVR 2012 ([facility]),       COPD ,on home 02; no prior tobacco use      AFib on Coumadin , failed  ablation,   ,  PHTN,   CKD 3 (b/l SCr ~1.3-1.6),  MIGUEL and gout.     h/o   hypercarbia and she reports  needing Bipap,in the past,  but hasn't used in 10 years.      Morbid  obesity,  BMI is 41     presented with  CP,  dizziness and sob     She also had epistaxis at [facility],/    1.,  Atrial fibrillation       metoprolol ER 25 mg  / lipitor     2.  MVR  mechanical,1999   was  on  IV heparin and warfarin with goal INR of 2.5-3.5 with mechanical MVR   daily INR   3.  TVR, 2012 at [facility]   4.  HTN, on Toprol   5. Dizziness , since resolved  Ct head , with infarcts, Right b. ganglia and Right cerebellum   6. .  Anemia,  has  c/c  anemia, , baseline is  8  to 9/   last colonoscopy  was  10 yrs  ago/ s/p melena last yr. and, no w/p was done.,as  pt was  deemed high risk  for  any procedure  7.  Acute  Diastolic  Chf    was on  iv bumex bid       daily weights/ follow   I/O//   still  with sob on exertion  8.  Severe Pulm Htn, on echo  Echo  12/20,normal  ef/mod  MS/  severe Pulm Htn  per card dr mcnamara , pt   transferred to  [place]   for ELIZABETH/ ? fistula  from aorta  to RV/     s/p epistaxis  seen by ent  heart  failure team following pt / has lost 3  kg  ELIZABETH,  cancelled  per  floor  by echo dept/  spoke  with  chf   Dr alba from 1/8.  MVR/  decreased  RV function/ no pfo/vsd  pe  ENT, packing removal on 1/12  Mechanical MVR./  on coumadin  on exertion on 1/11, pt with mild  sob and hypoxia  in high  80's. cxr with chf  . bumex  increased  to 2 mg bid/  pt has lost 4 Kg   INR  daily  pulm to  set  up  nocturnal  Awaps  trilogy/ MIGUEL  inr  is  pending     d/c  ,pending  procerement  of trilogy, unclear  if  pt  needs  sleep  study  again/ pulm  to  f/p    f/p  with pmd/ dr gomez/  check inr,  next week  per  care  lucy note, awaiting insurance approval for Trilogy, next week       < from: Transthoracic Echocardiogram (01.08.21 @ 15:53) >  Conclusions:  1. Mechanical prosthetic mitral valve replacement. Peak  mitral valve gradient equals 15 mm Hg, mean transmitral  valve gradient equals 6 mm Hg, which is elevated even in  the setting of a mechanical prosthetic mitral valve  replacement (Heart rate 69 bpm).  2. Refer to prior echos for assesssment of LV function.  Septal motion consistent with cardiac surgery, right  ventricular overload.  3. Right atrial enlargement.  4. Right ventricular enlargement with decreased right  ventricular systolic function.  5. An annuloplasty ring is seen in the tricuspid position.  PG 3 mm hg, MG 1 mm hg. No tricuspid regurgitation.  6. Agitated saline injection demonstrates evidence of a  patent foramen ovale/VSD.  No color Doppler evidence of  < end of copied text >     < from: CT Head No Cont (12.29.20 @ 20:41)   IMPRESSION: No acute intracranial hemorrhage. Age-indeterminate right basal ganglia lacunar infarct and right cerebellar lacunar infarct. If there is clinical suspicion for acute infarct, consider brain MRI if there is no contraindication.  < end of copied text >    < from: TTE Echo Complete w/ Contrast w/ Doppler (12.31.20 @ 14:01) >  ummary:   1. Left ventricular ejection fraction, by visual estimation, is 60 to 65%.   2. Technically suboptimal study.   3. Normal global left ventricular systolic function.   4. Normal left ventricular internal cavity size.   5. Spectral Doppler shows pseudonormal pattern of left ventricular myocardial filling (Grade II diastolic dysfunction).   6. Normal right ventricular size and function.   7. Mild to moderately enlarged left atrium.   8. There is no evidence of pericardial effusion.   9. Mild mitral annular calcification.  10. Mild mitral valve regurgitation.  11. Mild thickening and calcification of the anterior and posterior mitral valve leaflets.  12. Peak transmitral mean gradient equals 6.8 mmHg, calculated mitral valve area by pressure half time equals 1.86 cm² consistent withmild mitral stenosis.  13. Mild tricuspid regurgitation.  14. Mild aortic regurgitation.  15. Sclerotic aortic valve with normal opening.  16. Estimated pulmonary artery systolic pressure is 68.4 mmHg assuming a right atrial pressure of 8 mmHg, whichis consistent with severe pulmonary hypertension.  17. C/w the study of 6-21-20, more significant pulmonary htn is now noted.  18. Endocardial visualization was enhanced with intravenous echo contrast.  19. Mitral valve mean gradient is 6.8 mmHg consistent with moderate mitral stenosis.  Montana Barreto MD FACC, FASE, FAC  < end of copied text >

## 2021-01-17 LAB
BLD GP AB SCN SERPL QL: NEGATIVE — SIGNIFICANT CHANGE UP
HCT VFR BLD CALC: 29.3 % — LOW (ref 34.5–45)
HGB BLD-MCNC: 8.3 G/DL — LOW (ref 11.5–15.5)
INR BLD: 2.57 RATIO — HIGH (ref 0.88–1.16)
MCHC RBC-ENTMCNC: 25.8 PG — LOW (ref 27–34)
MCHC RBC-ENTMCNC: 28.3 GM/DL — LOW (ref 32–36)
MCV RBC AUTO: 91 FL — SIGNIFICANT CHANGE UP (ref 80–100)
NRBC # BLD: 0 /100 WBCS — SIGNIFICANT CHANGE UP (ref 0–0)
PLATELET # BLD AUTO: 369 K/UL — SIGNIFICANT CHANGE UP (ref 150–400)
PROTHROM AB SERPL-ACNC: 29.5 SEC — HIGH (ref 10.6–13.6)
RBC # BLD: 3.22 M/UL — LOW (ref 3.8–5.2)
RBC # FLD: 16.5 % — HIGH (ref 10.3–14.5)
RH IG SCN BLD-IMP: POSITIVE — SIGNIFICANT CHANGE UP
WBC # BLD: 6.04 K/UL — SIGNIFICANT CHANGE UP (ref 3.8–10.5)
WBC # FLD AUTO: 6.04 K/UL — SIGNIFICANT CHANGE UP (ref 3.8–10.5)

## 2021-01-17 PROCEDURE — ZZZZZ: CPT

## 2021-01-17 RX ORDER — WARFARIN SODIUM 2.5 MG/1
7.5 TABLET ORAL ONCE
Refills: 0 | Status: COMPLETED | OUTPATIENT
Start: 2021-01-17 | End: 2021-01-17

## 2021-01-17 RX ADMIN — Medication 1 APPLICATION(S): at 17:28

## 2021-01-17 RX ADMIN — Medication 100 MILLIGRAM(S): at 17:28

## 2021-01-17 RX ADMIN — Medication 1 SPRAY(S): at 21:12

## 2021-01-17 RX ADMIN — SIMVASTATIN 10 MILLIGRAM(S): 20 TABLET, FILM COATED ORAL at 21:12

## 2021-01-17 RX ADMIN — WARFARIN SODIUM 7.5 MILLIGRAM(S): 2.5 TABLET ORAL at 21:12

## 2021-01-17 RX ADMIN — Medication 1 TABLET(S): at 18:44

## 2021-01-17 RX ADMIN — BUDESONIDE AND FORMOTEROL FUMARATE DIHYDRATE 2 PUFF(S): 160; 4.5 AEROSOL RESPIRATORY (INHALATION) at 17:28

## 2021-01-17 RX ADMIN — BUDESONIDE AND FORMOTEROL FUMARATE DIHYDRATE 2 PUFF(S): 160; 4.5 AEROSOL RESPIRATORY (INHALATION) at 05:50

## 2021-01-17 RX ADMIN — Medication 1 SPRAY(S): at 05:47

## 2021-01-17 RX ADMIN — Medication 25 MILLIGRAM(S): at 08:12

## 2021-01-17 RX ADMIN — BUMETANIDE 2 MILLIGRAM(S): 0.25 INJECTION INTRAMUSCULAR; INTRAVENOUS at 17:29

## 2021-01-17 RX ADMIN — BUMETANIDE 2 MILLIGRAM(S): 0.25 INJECTION INTRAMUSCULAR; INTRAVENOUS at 05:47

## 2021-01-17 RX ADMIN — Medication 1 APPLICATION(S): at 05:47

## 2021-01-17 NOTE — PROVIDER CONTACT NOTE (OTHER) - RECOMMENDATIONS
No interventions at this time.
Assess patient. Review patient's orders, history, plan, heart rate & rhythm on the monitor.
ENT to evaluate? Toprol XL?
NP to review chart.
NP to review pt chart
Pt pinching nose and both nostrils stuffed with tissue

## 2021-01-17 NOTE — PROGRESS NOTE ADULT - ASSESSMENT
71 yo female with significant cardiac history on coumadin presenting with right nare epistaxis. Anterior septum cauterized with silver nitrate and nasopore wrapped in surgicel coated in bacitracin placed in right nare.

## 2021-01-17 NOTE — CHART NOTE - NSCHARTNOTEFT_GEN_A_CORE
Episodic Note    Notified by the RN that patient had an episode of epistaxis with associated clots of blood. Patient had previous episodes of epistaxis, and was being followed by ENT for this. However, this epistaxis resolved recently and patient was restarted on Coumadin by the team, with a dose she received last night 1/16/2021, and now this AM on 1/17/2021 patient with an episode of epistaxis. Patient seen and evaluated at bedside. Patient denies any acute SOB, chest pain, palpitations, nausea, vomiting, or feeling of throat closing.     Vital Signs Last 24 Hrs  T(C): 36.9 (17 Jan 2021 04:07), Max: 36.9 (17 Jan 2021 04:07)  T(F): 98.4 (17 Jan 2021 04:07), Max: 98.4 (17 Jan 2021 04:07)  HR: 57 (17 Jan 2021 04:07) (57 - 98)  BP: 117/74 (17 Jan 2021 04:07) (117/74 - 145/74)  RR: 18 (17 Jan 2021 04:07) (18 - 18)  SpO2: 98% (17 Jan 2021 04:07) (95% - 98%)    PHYSICAL EXAM:       ASSESSMENT/PLAN:  HPI:71 yo female with a PMHx of CAD, HFpEF (EF 65-60%), severe MR s/p mechanical MVR 1999 (Dr. Anderson, [facility]), severe TR s/p mechanical TVR (2012 [facility]), afib s/p ablation, HTN, HLD, CKD (bl 1.35-1.5), COPD on 3L NC at home, MIGUEL who presented to the [facility] for cp, sob, dizziness x 1 week, found to be in decompensated HFpEF, w/ tachy-ofelia rhythm. Seen by Dr. Thrasher Cardiology at [facility]. s/p IV diuresis w/ lasix BID. TTE 12/31 reviewed by [facility] Dr. Escobar, c/f possible L-R shunt, ?fistula between aortic valve and RV. Patient transferred to [facility] for ELIZABETH and escalation of care. Now presents with an episode of epistaxis.     1. IMPRESSION: Episode of Epistaxis   > ENT called to see the patient.   > CBC and Type and Screen Ordered. Will endorse to the oncoming Medicine (ACP) day provider to f/u the results and manage accordingly.   > Instructed the patient and RN to continue to apply pressure to the nostril.   > At this time, patient is not ordered for any further anticoagulation, with last dose of Coumadin ordered by the team on 1/16/2021.  > PT/INR ordered for this AM. Will endorse to the [facility] ([facility]) day provider to f/u the results and manage accordingly.   > Continue to monitor patient and vitals closely.   > Continue with the ocean spray and bacitracin at this time to the affected area of her nostril.  > F/u with the primary care team in the AM.     RN aware of the above management plan. Will endorse to the [facility] ([facility]) day provider to f/u on the above and manage accordingly.     Cassidy Gonzalez PA-C  Department of Medicine   #[id number] Episodic Note    Notified by the RN that patient had an episode of epistaxis with associated clots of blood. Patient had previous episodes of epistaxis, and was being followed by ENT for this. However, this epistaxis resolved recently and patient was restarted on Coumadin by the team, with a dose she received last night 1/16/2021, and now this AM on 1/17/2021 patient with an episode of epistaxis. Patient seen and evaluated at bedside. Patient denies any acute SOB, chest pain, palpitations, nausea, vomiting, or feeling of throat closing.     Vital Signs Last 24 Hrs  T(C): 36.9 (17 Jan 2021 04:07), Max: 36.9 (17 Jan 2021 04:07)  T(F): 98.4 (17 Jan 2021 04:07), Max: 98.4 (17 Jan 2021 04:07)  HR: 57 (17 Jan 2021 04:07) (57 - 98)  BP: 117/74 (17 Jan 2021 04:07) (117/74 - 145/74)  RR: 18 (17 Jan 2021 04:07) (18 - 18)  SpO2: 98% (17 Jan 2021 04:07) (95% - 98%)    PHYSICAL EXAM:   CONSTITUTIONAL: alert   HEAD:  Atraumatic, Normocephalic  NOSTRIL: Right nostril epistaxis   CHEST/LUNG:   no rales, no rhonchi  HEART: Regular rate and rhythm  ABDOMEN: Soft, Nontender  NEUROLOGY:  alert    ASSESSMENT/PLAN:  HPI:71 yo female with a PMHx of CAD, HFpEF (EF 65-60%), severe MR s/p mechanical MVR 1999 (Dr. Anderson, [facility]), severe TR s/p mechanical TVR (2012 [facility]), afib s/p ablation, HTN, HLD, CKD (bl 1.35-1.5), COPD on 3L NC at home, MIGUEL who presented to the [facility] for cp, sob, dizziness x 1 week, found to be in decompensated HFpEF, w/ tachy-ofelia rhythm. Seen by Dr. Thrasher Cardiology at [facility]. s/p IV diuresis w/ lasix BID. TTE 12/31 reviewed by [facility] Dr. Escobar, c/f possible L-R shunt, ?fistula between aortic valve and RV. Patient transferred to [facility] for ELIZABETH and escalation of care. Now presents with an episode of epistaxis.     1. IMPRESSION: Episode of Epistaxis   > ENT called to see the patient.   > CBC and Type and Screen Ordered. Will endorse to the [facility] ([facility]) day provider to f/u the results and manage accordingly.   > Instructed the patient and RN to continue to apply pressure to the nostril.   > At this time, patient is not ordered for any further anticoagulation, with last dose of Coumadin ordered by the team on 1/16/2021.  > PT/INR ordered for this AM. Will endorse to the [facility] ([facility]) day provider to f/u the results and manage accordingly.   > Continue to monitor patient and vitals closely.   > Continue with the ocean spray and bacitracin at this time to the affected area of her nostril.  > F/u with the primary care team in the AM.     RN aware of the above management plan. Will endorse to the [facility] ([facility]) day provider to f/u on the above and manage accordingly.     Cassidy Gonzalez PA-C  Department of Medicine   #[id number] Episodic Note    Notified by the RN that patient had an episode of epistaxis with associated clots of blood. Patient had previous episodes of epistaxis, and was being followed by ENT for this. However, this epistaxis resolved recently and patient was restarted on Coumadin by the team, with a dose she received last night 1/16/2021, and now this AM on 1/17/2021 patient with an episode of epistaxis. Patient also with a heart rate in the 50's. Patient seen and evaluated at bedside. Patient denies any acute SOB, chest pain, palpitations, nausea, vomiting, or feeling of throat closing.     Vital Signs Last 24 Hrs  T(C): 36.9 (17 Jan 2021 04:07), Max: 36.9 (17 Jan 2021 04:07)  T(F): 98.4 (17 Jan 2021 04:07), Max: 98.4 (17 Jan 2021 04:07)  HR: 57 (17 Jan 2021 04:07) (57 - 98)  BP: 117/74 (17 Jan 2021 04:07) (117/74 - 145/74)  RR: 18 (17 Jan 2021 04:07) (18 - 18)  SpO2: 98% (17 Jan 2021 04:07) (95% - 98%)    PHYSICAL EXAM:   CONSTITUTIONAL: alert   HEAD:  Atraumatic, Normocephalic  NOSTRIL: Right nostril epistaxis   CHEST/LUNG:   no rales, no rhonchi  HEART: heart rate in the 50s  ABDOMEN: Soft, Nontender  NEUROLOGY:  alert    ASSESSMENT/PLAN:  HPI:69 yo female with a PMHx of CAD, HFpEF (EF 65-60%), severe MR s/p mechanical MVR 1999 (Dr. Anderson, [facility]), severe TR s/p mechanical TVR (2012 [facility]), afib s/p ablation, HTN, HLD, CKD (bl 1.35-1.5), COPD on 3L NC at home, MIGUEL who presented to the [facility] for cp, sob, dizziness x 1 week, found to be in decompensated HFpEF, w/ tachy-ofelia rhythm. Seen by Dr. Thrasher Cardiology at [facility]. s/p IV diuresis w/ lasix BID. TTE 12/31 reviewed by [facility] Dr. Escobar, c/f possible L-R shunt, ?fistula between aortic valve and RV. Patient transferred to [facility] for ELIZABETH and escalation of care. Now presents with an episode of epistaxis and heart rate in the 50's.    1. IMPRESSION: Episode of Epistaxis   > ENT called to see the patient. Will endorse to the [facility]) day provider to f/u and manage accordingly.   > CBC and Type and Screen Ordered. Will endorse to the [facility]) day provider to f/u the results and manage accordingly.   > Instructed the patient and RN to continue to apply pressure to the nostril.   > At this time, patient is not ordered for any further anticoagulation, with last dose of Coumadin ordered by the team on 1/16/2021.  > PT/INR ordered for this AM. Will endorse to the [facility]) day provider to f/u the results and manage accordingly.   > Continue to monitor patient and vitals closely.   > Continue with the ocean spray and bacitracin at this time to the affected area of her nostril.  > F/u with the primary care team in the AM.     2. IMPRESSION: Heart rate in the 50's  > Since patient with no cardiac symptoms acutely, continue to monitor.   > Instructed RN to reschedule the AM Toprol XL to later in the AM when the patient is more awake, to see if the heart rate rises.   > Instructed RN to tell the provider the heart rate, prior to giving the Toprol XL, so further management can be made if necessary.   > Will endorse this to the [facility]) day provider to f/u and manage accordingly.   > Continue to monitor the patient and vitals closely.   > F/u with the primary care team in the AM.     RN aware of the above management plan. Will endorse to the [facility]) day provider to f/u on the above and manage accordingly.     Cassidy Gonzalez PA-C  Department of Medicine   #[id number]

## 2021-01-17 NOTE — PROGRESS NOTE ADULT - PROBLEM SELECTOR PLAN 1
- gram-positive abx coverage for duration of packing placement  - Strict blood pressure control.  - Nasal saline, 2 sprays to both nares 4 times a day  - Avoid nasal trauma; no nose rubbing, blowing or manipulating nasal packing.  Sneeze with mouth open and pinching nares.  - Avoid bending with head blow the waist.    - No heavy lifting

## 2021-01-17 NOTE — PROGRESS NOTE ADULT - SUBJECTIVE AND OBJECTIVE BOX
ENT ISSUE/POD: epistaxis     HPI: 71 yo female with significant cardiac history on coumadin and well known by ENT for epistaxis and multiple packing placements / cauterizations, presents with new episode of right nare epistaxis. Pt denies h/a, recent URI, congestion, facial pain, facial tenderness, rhinorrhea, PND or changes in vision. INR 2.57        PAST MEDICAL & SURGICAL HISTORY:  COPD (chronic obstructive pulmonary disease)    Hypertension    CHF (congestive heart failure)    Tricuspid valve replaced    Mitral valve replaced    Gout    HTN (hypertension)    CHF (congestive heart failure)    COPD (chronic obstructive pulmonary disease)  on home O2    MIGUEL (obstructive sleep apnea)    Pulmonary hypertension    Atrial fibrillation  S/P Ablation    No significant past surgical history    Tricuspid valve replaced  mechanical    History of mitral valve replacement with mechanical valve      Allergies    No Known Allergies    Intolerances      MEDICATIONS  (STANDING):  allopurinol 100 milliGRAM(s) Oral daily  BACItracin   Ointment 1 Application(s) Topical two times a day  budesonide 160 MICROgram(s)/formoterol 4.5 MICROgram(s) Inhaler 2 Puff(s) Inhalation two times a day  buMETAnide 2 milliGRAM(s) Oral two times a day  metoprolol succinate ER 25 milliGRAM(s) Oral daily  simvastatin 10 milliGRAM(s) Oral at bedtime  warfarin 7.5 milliGRAM(s) Oral once    MEDICATIONS  (PRN):  sodium chloride 0.65% Nasal 1 Spray(s) Both Nostrils two times a day PRN Nasal Congestion      Social History: see consult    Family history: see consult    ROS:   ENT: all negative except as noted in HPI   Pulm: denies SOB, cough, hemoptysis  Neuro: denies numbness/tingling, loss of sensation  Endo: denies heat/cold intolerance, excessive sweating      Vital Signs Last 24 Hrs  T(C): 36.8 (17 Jan 2021 11:56), Max: 36.9 (17 Jan 2021 04:07)  T(F): 98.2 (17 Jan 2021 11:56), Max: 98.4 (17 Jan 2021 04:07)  HR: 53 (17 Jan 2021 16:53) (53 - 98)  BP: 108/66 (17 Jan 2021 11:56) (108/66 - 125/69)  BP(mean): --  RR: 18 (17 Jan 2021 11:56) (18 - 18)  SpO2: 98% (17 Jan 2021 16:53) (95% - 100%)                          8.3    6.04  )-----------( 369      ( 17 Jan 2021 07:35 )             29.3         PT/INR - ( 17 Jan 2021 07:36 )   PT: 29.5 sec;   INR: 2.57 ratio             PHYSICAL EXAM:  Gen: NAD  Skin: No rashes, bruises, or lesions  Head: Normocephalic, Atraumatic  Face: no edema, erythema, or fluctuance. Parotid glands soft without mass  Eyes: no scleral injection  Nose: Right nare: active bleeding, anterior septum cauterized with silver nitrate, nasopore wrapped in surgicel coated in bacitracin placed. Left nare: no active bleeding or discharge.   Mouth: No bleeding in posterior pharynx. No Stridor / Drooling / Trismus.  Mucosa moist, tongue/uvula midline, oropharynx clear  Neck: Flat, supple, no lymphadenopathy, trachea midline, no masses  Lymphatic: No lymphadenopathy  Resp: breathing easily, no stridor  Neuro: facial nerve intact, no facial droop

## 2021-01-17 NOTE — PROGRESS NOTE ADULT - SUBJECTIVE AND OBJECTIVE BOX
no  complaints    REVIEW OF SYSTEMS:  GEN: no fever,    no chills  RESP: no SOB,   no cough  CVS: no chest pain,   no palpitations  GI: no abdominal pain,   no nausea,   no vomiting,   no constipation,   no diarrhea  : no dysuria,   no frequency  NEURO: no headache,   no dizziness  PSYCH: no depression,   not anxious  Derm : no rash    MEDICATIONS  (STANDING):  allopurinol 100 milliGRAM(s) Oral daily  BACItracin   Ointment 1 Application(s) Topical two times a day  budesonide 160 MICROgram(s)/formoterol 4.5 MICROgram(s) Inhaler 2 Puff(s) Inhalation two times a day  buMETAnide 2 milliGRAM(s) Oral two times a day  metoprolol succinate ER 25 milliGRAM(s) Oral daily  simvastatin 10 milliGRAM(s) Oral at bedtime    MEDICATIONS  (PRN):  sodium chloride 0.65% Nasal 1 Spray(s) Both Nostrils two times a day PRN Nasal Congestion      Vital Signs Last 24 Hrs  T(C): 36.9 (17 Jan 2021 04:07), Max: 36.9 (17 Jan 2021 04:07)  T(F): 98.4 (17 Jan 2021 04:07), Max: 98.4 (17 Jan 2021 04:07)  HR: 67 (17 Jan 2021 07:00) (57 - 98)  BP: 117/74 (17 Jan 2021 04:07) (117/74 - 145/74)  BP(mean): --  RR: 18 (17 Jan 2021 04:07) (18 - 18)  SpO2: 98% (17 Jan 2021 05:33) (95% - 98%)  CAPILLARY BLOOD GLUCOSE        I&O's Summary    16 Jan 2021 07:01  -  17 Jan 2021 07:00  --------------------------------------------------------  IN: 1200 mL / OUT: 2250 mL / NET: -1050 mL        PHYSICAL EXAM:  HEAD:  Atraumatic, Normocephalic  NECK: Supple, No   JVD  CHEST/LUNG:   no     rales,     no,    rhonchi  HEART: Regular rate and rhythm;         murmur  ABDOMEN: Soft, Nontender, ;   EXTREMITIES:   no     edema  NEUROLOGY:  alert    LABS:                        8.7    5.89  )-----------( 393      ( 16 Jan 2021 07:44 )             30.5           PT/INR - ( 16 Jan 2021 07:44 )   PT: 29.6 sec;   INR: 2.58 ratio         PTT - ( 15 Ruddy 2021 09:32 )  PTT:198.2 sec                Thyroid Stimulating Hormone, Serum: 1.630 uIU/mL (01-02 @ 06:57)          Consultant(s) Notes Reviewed:      Care Discussed with Consultants/Other Providers:

## 2021-01-17 NOTE — PROGRESS NOTE ADULT - ASSESSMENT
70 years      h/o   CAD,  mechanical MVR and Tricuspid valve repair,        h/o  chronic   HFpEF,   HTN  . MIGUEL ,     severe MR  ,  s/p mechanical MVR 1999 , [facility] with Dr. Anderson,  and severe TR s/p TVR 2012 ([facility]),       COPD ,on home 02; no prior tobacco use      AFib on Coumadin , failed  ablation,   ,  PHTN,   CKD 3 (b/l SCr ~1.3-1.6),  MIGUEL and gout.     h/o   hypercarbia and she reports  needing Bipap,in the past,  but hasn't used in 10 years.      Morbid  obesity,  BMI is 41     presented with  CP,  dizziness and sob     She also had epistaxis at [facility],/    1.,  Atrial fibrillation       metoprolol ER 25 mg  / lipitor     2.  MVR  mechanical,1999   was  on  IV heparin and warfarin with goal INR of 2.5-3.5 with mechanical MVR   daily INR   3.  TVR, 2012 at [facility]   4.  HTN, on Toprol   5. Dizziness , since resolved  Ct head , with infarcts, Right b. ganglia and Right cerebellum   6. .  Anemia,  has  c/c  anemia, , baseline is  8  to 9/   last colonoscopy  was  10 yrs  ago/ s/p melena last yr. and, no w/p was done.,as  pt was  deemed high risk  for  any procedure  7.  Acute  Diastolic  Chf    was on  iv bumex bid       daily weights/ follow   I/O//   still  with sob on exertion  8.  Severe Pulm Htn, on echo  Echo  12/20,normal  ef/mod  MS/  severe Pulm Htn  per card dr mcnamara , pt   transferred to  [place]   for ELIZABETH/ ? fistula  from aorta  to RV/     s/p epistaxis  seen by ent  heart  failure team following pt / has lost 3  kg  ELIZABETH,  cancelled  per  floor  by echo dept/  spoke  with  chf   Dr alba from 1/8.  MVR/  decreased  RV function/ no pfo/vsd  pe  ENT, packing removal on 1/12  Mechanical MVR./  on coumadin  on exertion on 1/11, pt with mild  sob and hypoxia  in high  80's. cxr with chf  . bumex  increased  to 2 mg bid/  pt has lost 4 Kg   INR  daily  pulm to  help with   nocturnal  Awaps  trilogy/ MIGUEL     d/c  ,pending  procerement  of trilogy,  pulm  to  f/p    f/p  with pmd/ dr gomez/  check inr,  next week  per  care  lucy note, awaiting insurance approval for Trilogy, next week  inr  pending       < from: Transthoracic Echocardiogram (01.08.21 @ 15:53) >  Conclusions:  1. Mechanical prosthetic mitral valve replacement. Peak  mitral valve gradient equals 15 mm Hg, mean transmitral  valve gradient equals 6 mm Hg, which is elevated even in  the setting of a mechanical prosthetic mitral valve  replacement (Heart rate 69 bpm).  2. Refer to prior echos for assesssment of LV function.  Septal motion consistent with cardiac surgery, right  ventricular overload.  3. Right atrial enlargement.  4. Right ventricular enlargement with decreased right  ventricular systolic function.  5. An annuloplasty ring is seen in the tricuspid position.  PG 3 mm hg, MG 1 mm hg. No tricuspid regurgitation.  6. Agitated saline injection demonstrates evidence of a  patent foramen ovale/VSD.  No color Doppler evidence of  < end of copied text >     < from: CT Head No Cont (12.29.20 @ 20:41)   IMPRESSION: No acute intracranial hemorrhage. Age-indeterminate right basal ganglia lacunar infarct and right cerebellar lacunar infarct. If there is clinical suspicion for acute infarct, consider brain MRI if there is no contraindication.  < end of copied text >    < from: TTE Echo Complete w/ Contrast w/ Doppler (12.31.20 @ 14:01) >  ummary:   1. Left ventricular ejection fraction, by visual estimation, is 60 to 65%.   2. Technically suboptimal study.   3. Normal global left ventricular systolic function.   4. Normal left ventricular internal cavity size.   5. Spectral Doppler shows pseudonormal pattern of left ventricular myocardial filling (Grade II diastolic dysfunction).   6. Normal right ventricular size and function.   7. Mild to moderately enlarged left atrium.   8. There is no evidence of pericardial effusion.   9. Mild mitral annular calcification.  10. Mild mitral valve regurgitation.  11. Mild thickening and calcification of the anterior and posterior mitral valve leaflets.  12. Peak transmitral mean gradient equals 6.8 mmHg, calculated mitral valve area by pressure half time equals 1.86 cm² consistent withmild mitral stenosis.  13. Mild tricuspid regurgitation.  14. Mild aortic regurgitation.  15. Sclerotic aortic valve with normal opening.  16. Estimated pulmonary artery systolic pressure is 68.4 mmHg assuming a right atrial pressure of 8 mmHg, whichis consistent with severe pulmonary hypertension.  17. C/w the study of 6-21-20, more significant pulmonary htn is now noted.  18. Endocardial visualization was enhanced with intravenous echo contrast.  19. Mitral valve mean gradient is 6.8 mmHg consistent with moderate mitral stenosis.  Montana Barreto MD FACC, FASE, FAC  < end of copied text >

## 2021-01-18 LAB
INR BLD: 2.85 RATIO — HIGH (ref 0.88–1.16)
PROTHROM AB SERPL-ACNC: 32.5 SEC — HIGH (ref 10.6–13.6)

## 2021-01-18 PROCEDURE — 99232 SBSQ HOSP IP/OBS MODERATE 35: CPT

## 2021-01-18 RX ORDER — OXYMETAZOLINE HYDROCHLORIDE 0.5 MG/ML
1 SPRAY NASAL ONCE
Refills: 0 | Status: COMPLETED | OUTPATIENT
Start: 2021-01-18 | End: 2021-01-18

## 2021-01-18 RX ORDER — SODIUM CHLORIDE 0.65 %
2 AEROSOL, SPRAY (ML) NASAL
Refills: 0 | Status: DISCONTINUED | OUTPATIENT
Start: 2021-01-18 | End: 2021-01-20

## 2021-01-18 RX ORDER — ONDANSETRON 8 MG/1
4 TABLET, FILM COATED ORAL ONCE
Refills: 0 | Status: COMPLETED | OUTPATIENT
Start: 2021-01-18 | End: 2021-01-18

## 2021-01-18 RX ORDER — POLYETHYLENE GLYCOL 3350 17 G/17G
17 POWDER, FOR SOLUTION ORAL ONCE
Refills: 0 | Status: COMPLETED | OUTPATIENT
Start: 2021-01-18 | End: 2021-01-18

## 2021-01-18 RX ORDER — WARFARIN SODIUM 2.5 MG/1
6 TABLET ORAL ONCE
Refills: 0 | Status: COMPLETED | OUTPATIENT
Start: 2021-01-18 | End: 2021-01-18

## 2021-01-18 RX ADMIN — Medication 1 APPLICATION(S): at 17:46

## 2021-01-18 RX ADMIN — BUMETANIDE 2 MILLIGRAM(S): 0.25 INJECTION INTRAMUSCULAR; INTRAVENOUS at 06:18

## 2021-01-18 RX ADMIN — Medication 1 TABLET(S): at 05:24

## 2021-01-18 RX ADMIN — Medication 1 APPLICATION(S): at 05:24

## 2021-01-18 RX ADMIN — Medication 1 TABLET(S): at 17:46

## 2021-01-18 RX ADMIN — SIMVASTATIN 10 MILLIGRAM(S): 20 TABLET, FILM COATED ORAL at 21:58

## 2021-01-18 RX ADMIN — BUDESONIDE AND FORMOTEROL FUMARATE DIHYDRATE 2 PUFF(S): 160; 4.5 AEROSOL RESPIRATORY (INHALATION) at 05:24

## 2021-01-18 RX ADMIN — WARFARIN SODIUM 6 MILLIGRAM(S): 2.5 TABLET ORAL at 21:58

## 2021-01-18 RX ADMIN — POLYETHYLENE GLYCOL 3350 17 GRAM(S): 17 POWDER, FOR SOLUTION ORAL at 13:58

## 2021-01-18 RX ADMIN — Medication 25 MILLIGRAM(S): at 13:27

## 2021-01-18 RX ADMIN — ONDANSETRON 4 MILLIGRAM(S): 8 TABLET, FILM COATED ORAL at 13:49

## 2021-01-18 RX ADMIN — BUMETANIDE 2 MILLIGRAM(S): 0.25 INJECTION INTRAMUSCULAR; INTRAVENOUS at 17:48

## 2021-01-18 RX ADMIN — BUDESONIDE AND FORMOTEROL FUMARATE DIHYDRATE 2 PUFF(S): 160; 4.5 AEROSOL RESPIRATORY (INHALATION) at 17:47

## 2021-01-18 RX ADMIN — Medication 100 MILLIGRAM(S): at 17:46

## 2021-01-18 RX ADMIN — OXYMETAZOLINE HYDROCHLORIDE 1 SPRAY(S): 0.5 SPRAY NASAL at 21:58

## 2021-01-18 NOTE — PROGRESS NOTE ADULT - ASSESSMENT
70 years      h/o   CAD,  mechanical MVR and Tricuspid valve repair,        h/o  chronic   HFpEF,   HTN  . MIGUEL ,     severe MR  ,  s/p mechanical MVR 1999 , [facility] with Dr. Anderson,  and severe TR s/p TVR 2012 ([facility]),       COPD ,on home 02; no prior tobacco use      AFib on Coumadin , failed  ablation,   ,  PHTN,   CKD 3 (b/l SCr ~1.3-1.6),  MIGUEL and gout.     h/o   hypercarbia and she reports  needing Bipap,in the past,  but hasn't used in 10 years.      Morbid  obesity,  BMI is 41     presented with  CP,  dizziness and sob     She also had epistaxis at [facility],/    1.,  Atrial fibrillation       metoprolol ER 25 mg  / lipitor     2.  MVR  mechanical,1999   was  on  IV heparin and warfarin with goal INR of 2.5-3.5 with mechanical MVR   daily INR   3.  TVR, 2012 at [facility]   4.  HTN, on Toprol   5. Dizziness , since resolved  Ct head , with infarcts, Right b. ganglia and Right cerebellum   6. .  Anemia,  has  c/c  anemia, , baseline is  8  to 9/   last colonoscopy  was  10 yrs  ago/ s/p melena last yr. and, no w/p was done.,as  pt was  deemed high risk  for  any procedure  7.  Acute  Diastolic  Chf    was on  iv bumex bid       daily weights/ follow   I/O//   still  with sob on exertion  8.  Severe Pulm Htn, on echo  Echo  12/20,normal  ef/mod  MS/  severe Pulm Htn  per card dr mcnamara , pt   transferred to  [place]   for ELIZABETH/ ? fistula  from aorta  to RV/     s/p epistaxis  seen by ent  heart  failure team following pt / has lost 3  kg  ELIZABETH,  cancelled  per  floor  by echo dept/  spoke  with  chf   Dr alba from 1/8.  MVR/  decreased  RV function/ no pfo/vsd  pe  ENT, packing removal on 1/12  Mechanical MVR./  on coumadin  on exertion on 1/11, pt with mild  sob and hypoxia  in high  80's. cxr with chf  . bumex  increased  to 2 mg bid/  pt has lost 4 Kg   INR  daily  pulm to  help with   nocturnal  Awaps  trilogy/ MIGUEL     d/c  ,pending  procerement  of trilogy,  pulm  to  f/p    f/p  with pmd/ dr gomez/  check inr,  next week  per  care  lucy note, awaiting insurance approval for Trilogy  inr  pending/  s/p  epistaxis  on 1/17, seen by ent       < from: Transthoracic Echocardiogram (01.08.21 @ 15:53) >  Conclusions:  1. Mechanical prosthetic mitral valve replacement. Peak  mitral valve gradient equals 15 mm Hg, mean transmitral  valve gradient equals 6 mm Hg, which is elevated even in  the setting of a mechanical prosthetic mitral valve  replacement (Heart rate 69 bpm).  2. Refer to prior echos for assesssment of LV function.  Septal motion consistent with cardiac surgery, right  ventricular overload.  3. Right atrial enlargement.  4. Right ventricular enlargement with decreased right  ventricular systolic function.  5. An annuloplasty ring is seen in the tricuspid position.  PG 3 mm hg, MG 1 mm hg. No tricuspid regurgitation.  6. Agitated saline injection demonstrates evidence of a  patent foramen ovale/VSD.  No color Doppler evidence of  < end of copied text >     < from: CT Head No Cont (12.29.20 @ 20:41)   IMPRESSION: No acute intracranial hemorrhage. Age-indeterminate right basal ganglia lacunar infarct and right cerebellar lacunar infarct. If there is clinical suspicion for acute infarct, consider brain MRI if there is no contraindication.  < end of copied text >    < from: TTE Echo Complete w/ Contrast w/ Doppler (12.31.20 @ 14:01) >  ummary:   1. Left ventricular ejection fraction, by visual estimation, is 60 to 65%.   2. Technically suboptimal study.   3. Normal global left ventricular systolic function.   4. Normal left ventricular internal cavity size.   5. Spectral Doppler shows pseudonormal pattern of left ventricular myocardial filling (Grade II diastolic dysfunction).   6. Normal right ventricular size and function.   7. Mild to moderately enlarged left atrium.   8. There is no evidence of pericardial effusion.   9. Mild mitral annular calcification.  10. Mild mitral valve regurgitation.  11. Mild thickening and calcification of the anterior and posterior mitral valve leaflets.  12. Peak transmitral mean gradient equals 6.8 mmHg, calculated mitral valve area by pressure half time equals 1.86 cm² consistent withmild mitral stenosis.  13. Mild tricuspid regurgitation.  14. Mild aortic regurgitation.  15. Sclerotic aortic valve with normal opening.  16. Estimated pulmonary artery systolic pressure is 68.4 mmHg assuming a right atrial pressure of 8 mmHg, whichis consistent with severe pulmonary hypertension.  17. C/w the study of 6-21-20, more significant pulmonary htn is now noted.  18. Endocardial visualization was enhanced with intravenous echo contrast.  19. Mitral valve mean gradient is 6.8 mmHg consistent with moderate mitral stenosis.  Montana Barreto MD FACC, FASE, FAC  < end of copied text >

## 2021-01-18 NOTE — CHART NOTE - NSCHARTNOTEFT_GEN_A_CORE
Notified by RN about blood clot from left nostril. Patient seen and examined at bedside and states she feels okay, but weak. Patient denies dizziness, syncope, chest pain, palpitations, sob.      Vital Signs Last 24 Hrs  T(C): 37 (18 Jan 2021 12:45), Max: 37 (18 Jan 2021 12:45)  T(F): 98.6 (18 Jan 2021 12:45), Max: 98.6 (18 Jan 2021 12:45)  HR: 74 (18 Jan 2021 12:45) (53 - 79)  BP: 162/74 (18 Jan 2021 12:45) (117/66 - 162/74)  RR: 20 (18 Jan 2021 12:45) (18 - 20)  SpO2: 93% (18 Jan 2021 12:45) (93% - 100%)    MEDICATIONS  (STANDING):  allopurinol 100 milliGRAM(s) Oral daily  amoxicillin  500 milliGRAM(s)/clavulanate 1 Tablet(s) Oral two times a day  BACItracin   Ointment 1 Application(s) Topical two times a day  budesonide 160 MICROgram(s)/formoterol 4.5 MICROgram(s) Inhaler 2 Puff(s) Inhalation two times a day  buMETAnide 2 milliGRAM(s) Oral two times a day  metoprolol succinate ER 25 milliGRAM(s) Oral daily  simvastatin 10 milliGRAM(s) Oral at bedtime  warfarin 6 milliGRAM(s) Oral once    MEDICATIONS  (PRN):  sodium chloride 0.65% Nasal 1 Spray(s) Both Nostrils two times a day PRN Nasal Congestion      PHYSICAL EXAM:  Gen: NAD, obese  Skin: No rashes, bruises, or lesions  Head: Normocephalic, Atraumatic  Face: no edema, erythema, or fluctuance. Parotid glands soft without mass  Eyes: no scleral injection  Nose: Right nare: packed with nasopore wrapped in surgicel coated in bacitracin Left nare: no active bleeding or discharge.   Mouth: No bleeding in posterior pharynx. No Stridor / Drooling / Trismus.  Mucosa moist, tongue/uvula midline, oropharynx clear  Neck: Flat, supple, no lymphadenopathy, trachea midline, no masses  Lymphatic: No lymphadenopathy  Resp: breathing easily, no stridor  Neuro: facial nerve intact, no facial droop    Assessment/Plan  69 yo female with significant cardiac history on coumadin and well known by ENT for epistaxis and multiple packing placements / cauterizations, presents with new episode of right nare epistaxis s/p cautery and packed with dissolvable nasopore and surgicel.    Plan:  1. Pt without symptoms and no active bleed. CBC in AM  2. Monitor patient vitals and status closely.  3. ENT Re-consulted.  4. Pt not cleared for dispo.    Plan discussed with Dr. Campos.    Bertrand (Darling Zamarripa PA-C   Spectra: [phone] Notified by RN about blood clot from left nostril. Patient seen and examined at bedside and states she feels okay, but weak. Patient denies dizziness, syncope, chest pain, palpitations, sob.      Vital Signs Last 24 Hrs  T(C): 37 (18 Jan 2021 12:45), Max: 37 (18 Jan 2021 12:45)  T(F): 98.6 (18 Jan 2021 12:45), Max: 98.6 (18 Jan 2021 12:45)  HR: 74 (18 Jan 2021 12:45) (53 - 79)  BP: 162/74 (18 Jan 2021 12:45) (117/66 - 162/74)  RR: 20 (18 Jan 2021 12:45) (18 - 20)  SpO2: 93% (18 Jan 2021 12:45) (93% - 100%)    MEDICATIONS  (STANDING):  allopurinol 100 milliGRAM(s) Oral daily  amoxicillin  500 milliGRAM(s)/clavulanate 1 Tablet(s) Oral two times a day  BACItracin   Ointment 1 Application(s) Topical two times a day  budesonide 160 MICROgram(s)/formoterol 4.5 MICROgram(s) Inhaler 2 Puff(s) Inhalation two times a day  buMETAnide 2 milliGRAM(s) Oral two times a day  metoprolol succinate ER 25 milliGRAM(s) Oral daily  simvastatin 10 milliGRAM(s) Oral at bedtime  warfarin 6 milliGRAM(s) Oral once    MEDICATIONS  (PRN):  sodium chloride 0.65% Nasal 1 Spray(s) Both Nostrils two times a day PRN Nasal Congestion      PHYSICAL EXAM:  Gen: NAD, obese  Skin: No rashes, bruises, or lesions  Head: Normocephalic, Atraumatic  Face: no edema, erythema, or fluctuance. Parotid glands soft without mass  Eyes: no scleral injection  Nose: Right nare: packed with nasopore wrapped in surgicel coated in bacitracin Left nare: no active bleeding or discharge.   Mouth: No bleeding in posterior pharynx. No Stridor / Drooling / Trismus.  Mucosa moist, tongue/uvula midline, oropharynx clear  Neck: Flat, supple, no lymphadenopathy, trachea midline, no masses  Lymphatic: No lymphadenopathy  Resp: breathing easily, no stridor  Neuro: facial nerve intact, no facial droop    Assessment/Plan  71 yo female with significant cardiac history on coumadin and well known by ENT for epistaxis and multiple packing placements / cauterizations, presents with new episode of right nare epistaxis s/p cautery and packed with dissolvable nasopore and surgicel.    Plan:  1. Pt without symptoms and no active bleed. CBC and PT/INR in AM  2. Monitor patient vitals and status closely.  3. ENT Re-consulted.  4. Pt not cleared for dispo.    Plan discussed with Dr. Campos.    Bertrand (Darling Zamarripa PA-C   Spectra: [phone]

## 2021-01-18 NOTE — PROGRESS NOTE ADULT - ASSESSMENT
69 yo female with significant cardiac history on coumadin with right nare epistaxis s/p anterior septum cauterized with silver nitrate and nasopore wrapped in surgicel coated in bacitracin placed in right nare; no bleeding since

## 2021-01-18 NOTE — PROGRESS NOTE ADULT - SUBJECTIVE AND OBJECTIVE BOX
PULMONARY PROGRESS NOTE    DAMARI GUERRERO  MRN-[id number]    Patient is a 70y old  Female who presents with a chief complaint of chf/ chf team (18 Jan 2021 06:59)      HPI:  -  -    ROS:   -    ACTIVE MEDICATION LIST:  MEDICATIONS  (STANDING):  allopurinol 100 milliGRAM(s) Oral daily  amoxicillin  500 milliGRAM(s)/clavulanate 1 Tablet(s) Oral two times a day  BACItracin   Ointment 1 Application(s) Topical two times a day  budesonide 160 MICROgram(s)/formoterol 4.5 MICROgram(s) Inhaler 2 Puff(s) Inhalation two times a day  buMETAnide 2 milliGRAM(s) Oral two times a day  metoprolol succinate ER 25 milliGRAM(s) Oral daily  simvastatin 10 milliGRAM(s) Oral at bedtime  warfarin 6 milliGRAM(s) Oral once    MEDICATIONS  (PRN):  sodium chloride 0.65% Nasal 1 Spray(s) Both Nostrils two times a day PRN Nasal Congestion      EXAM:  Vital Signs Last 24 Hrs  T(C): 36.8 (18 Jan 2021 04:11), Max: 36.8 (17 Jan 2021 11:56)  T(F): 98.2 (18 Jan 2021 04:11), Max: 98.2 (17 Jan 2021 11:56)  HR: 60 (18 Jan 2021 05:48) (53 - 72)  BP: 117/66 (18 Jan 2021 04:11) (108/66 - 155/80)  BP(mean): --  RR: 18 (18 Jan 2021 05:48) (18 - 18)  SpO2: 98% (18 Jan 2021 05:48) (93% - 100%)    GENERAL: The patient is awake and alert in no apparent distress.     LUNGS: Clear to auscultation without wheezing, rales or rhonchi; respirations unlabored    HEART: Regular rate and rhythm without murmur.                            8.3    6.04  )-----------( 369      ( 17 Jan 2021 07:35 )             29.3    < from: Xray Chest 1 View- PORTABLE-Urgent (Xray Chest 1 View- PORTABLE-Urgent .) (01.11.21 @ 17:43) >    EXAM:  XR CHEST PORTABLE URGENT 1V                            PROCEDURE DATE:  01/11/2021            INTERPRETATION:  TIME OF EXAM: January 11, 2021 at 5:38 PM.    CLINICAL INFORMATION: COPD. Desaturation on ambulation.    COMPARISON:  December 29,2020.    TECHNIQUE:   AP Portable chest x-ray. Limited by rotation. The chin obscures part of the upper image.    INTERPRETATION:    Heart size and the mediastinum cannot be accurately evaluated on this projection. Median sternotomy sutures, surgicalclips, and prosthetic cardiac valve again seen.  Elevated left hemidiaphragm again seen.  No definite focal lung consolidation seen.  There is continued accentuation of the central pulmonary vascular markings suggesting pulmonary vascular congestion.  No pleural effusion or pneumothorax noted.        IMPRESSION:  Elevated left hemidiaphragm again seen.    Continued accentuation of central pulmonary vascular markings suggesting pulmonary vascular congestion.                ANGELIQUE MICHELLE MD; Attending Radiologist  This document has been electronically signed. Jan 12 2021  9:52AM    < end of copied text >      PROBLEM LIST:  70y Female with HEALTH ISSUES - PROBLEM Dx:  Epistaxis  Epistaxis    Chronic kidney disease (CKD)  Chronic kidney disease (CKD)    Tricuspid valve replaced  Tricuspid valve replaced    Pulmonary hypertension  Pulmonary hypertension    COPD (chronic obstructive pulmonary disease)  COPD (chronic obstructive pulmonary disease)    Mitral valve replaced  Mitral valve replaced    Chronic atrial fibrillation  Chronic atrial fibrillation    Heart failure with preserved ejection fraction  Heart failure with preserved ejection fraction              RECS:        Please call with any questions.    Irma Eaton DO  [facility] Pulmonary/Sleep Medicine  [phone]   PULMONARY PROGRESS NOTE    DAMARI GUERRERO  MRN-[id number]    Patient is a 70y old  Female who presents with a chief complaint of chf/ chf team (18 Jan 2021 06:59)      HPI:  epistaxis over the weekend  now with right nare packing  no more bleeding  did not use BPAP overnight    ROS:   -    ACTIVE MEDICATION LIST:  MEDICATIONS  (STANDING):  allopurinol 100 milliGRAM(s) Oral daily  amoxicillin  500 milliGRAM(s)/clavulanate 1 Tablet(s) Oral two times a day  BACItracin   Ointment 1 Application(s) Topical two times a day  budesonide 160 MICROgram(s)/formoterol 4.5 MICROgram(s) Inhaler 2 Puff(s) Inhalation two times a day  buMETAnide 2 milliGRAM(s) Oral two times a day  metoprolol succinate ER 25 milliGRAM(s) Oral daily  simvastatin 10 milliGRAM(s) Oral at bedtime  warfarin 6 milliGRAM(s) Oral once    MEDICATIONS  (PRN):  sodium chloride 0.65% Nasal 1 Spray(s) Both Nostrils two times a day PRN Nasal Congestion      EXAM:  Vital Signs Last 24 Hrs  T(C): 36.8 (18 Jan 2021 04:11), Max: 36.8 (17 Jan 2021 11:56)  T(F): 98.2 (18 Jan 2021 04:11), Max: 98.2 (17 Jan 2021 11:56)  HR: 60 (18 Jan 2021 05:48) (53 - 72)  BP: 117/66 (18 Jan 2021 04:11) (108/66 - 155/80)  BP(mean): --  RR: 18 (18 Jan 2021 05:48) (18 - 18)  SpO2: 98% (18 Jan 2021 05:48) (93% - 100%)    GENERAL: The patient is awake and alert in no apparent distress.     LUNGS: Clear to auscultation without wheezing, rales or rhonchi; respirations unlabored    HEART: Regular rate and rhythm without murmur.                            8.3    6.04  )-----------( 369      ( 17 Jan 2021 07:35 )             29.3    < from: Xray Chest 1 View- PORTABLE-Urgent (Xray Chest 1 View- PORTABLE-Urgent .) (01.11.21 @ 17:43) >    EXAM:  XR CHEST PORTABLE URGENT 1V                            PROCEDURE DATE:  01/11/2021            INTERPRETATION:  TIME OF EXAM: January 11, 2021 at 5:38 PM.    CLINICAL INFORMATION: COPD. Desaturation on ambulation.    COMPARISON:  December 29,2020.    TECHNIQUE:   AP Portable chest x-ray. Limited by rotation. The chin obscures part of the upper image.    INTERPRETATION:    Heart size and the mediastinum cannot be accurately evaluated on this projection. Median sternotomy sutures, surgicalclips, and prosthetic cardiac valve again seen.  Elevated left hemidiaphragm again seen.  No definite focal lung consolidation seen.  There is continued accentuation of the central pulmonary vascular markings suggesting pulmonary vascular congestion.  No pleural effusion or pneumothorax noted.        IMPRESSION:  Elevated left hemidiaphragm again seen.    Continued accentuation of central pulmonary vascular markings suggesting pulmonary vascular congestion.                ANGELIQUE MICHELLE MD; Attending Radiologist  This document has been electronically signed. Jan 12 2021  9:52AM    < end of copied text >      PROBLEM LIST:  70y Female with HEALTH ISSUES - PROBLEM Dx:  Epistaxis  Epistaxis    Chronic kidney disease (CKD)  Chronic kidney disease (CKD)    Tricuspid valve replaced  Tricuspid valve replaced    Pulmonary hypertension  Pulmonary hypertension    COPD (chronic obstructive pulmonary disease)  COPD (chronic obstructive pulmonary disease)    Mitral valve replaced  Mitral valve replaced    Chronic atrial fibrillation  Chronic atrial fibrillation    Heart failure with preserved ejection fraction  Heart failure with preserved ejection fraction              RECS:  defer to ENT when okay to go back on BPAP  again suggest get her off 02 when awake  f/u cardio  agree with discharge planning with BPAP or AVAPs at home        Please call with any questions.    Irma Eaton, DO  [facility]  [phone]

## 2021-01-18 NOTE — PROGRESS NOTE ADULT - PROBLEM SELECTOR PLAN 1
- gram-positive abx coverage for duration of packing placement  - Strict blood pressure control.  - Nasal saline, 2 sprays to both nares 4 times a day  - Avoid nasal trauma; no nose rubbing, blowing or manipulating nasal packing.  Sneeze with mouth open and pinching nares.  - Avoid bending with head blow the waist.    - No heavy lifting.

## 2021-01-18 NOTE — PROGRESS NOTE ADULT - SUBJECTIVE AND OBJECTIVE BOX
s.p  epistaxis, seen by ent    REVIEW OF SYSTEMS:  GEN: no fever,    no chills  RESP: no SOB,   no cough  CVS: no chest pain,   no palpitations  GI: no abdominal pain,   no nausea,   no vomiting,   no constipation,   no diarrhea  : no dysuria,   no frequency  NEURO: no headache,   no dizziness  PSYCH: no depression,   not anxious  Derm : no rash    MEDICATIONS  (STANDING):  allopurinol 100 milliGRAM(s) Oral daily  amoxicillin  500 milliGRAM(s)/clavulanate 1 Tablet(s) Oral two times a day  BACItracin   Ointment 1 Application(s) Topical two times a day  budesonide 160 MICROgram(s)/formoterol 4.5 MICROgram(s) Inhaler 2 Puff(s) Inhalation two times a day  buMETAnide 2 milliGRAM(s) Oral two times a day  metoprolol succinate ER 25 milliGRAM(s) Oral daily  simvastatin 10 milliGRAM(s) Oral at bedtime    MEDICATIONS  (PRN):  sodium chloride 0.65% Nasal 1 Spray(s) Both Nostrils two times a day PRN Nasal Congestion      Vital Signs Last 24 Hrs  T(C): 36.8 (18 Jan 2021 04:11), Max: 36.8 (17 Jan 2021 11:56)  T(F): 98.2 (18 Jan 2021 04:11), Max: 98.2 (17 Jan 2021 11:56)  HR: 60 (18 Jan 2021 05:48) (53 - 72)  BP: 117/66 (18 Jan 2021 04:11) (108/66 - 155/80)  BP(mean): --  RR: 18 (18 Jan 2021 05:48) (18 - 18)  SpO2: 98% (18 Jan 2021 05:48) (93% - 100%)  CAPILLARY BLOOD GLUCOSE        I&O's Summary    17 Jan 2021 07:01  -  18 Jan 2021 07:00  --------------------------------------------------------  IN: 1000 mL / OUT: 2700 mL / NET: -1700 mL        PHYSICAL EXAM:  HEAD:  Atraumatic, Normocephalic  NECK: Supple, No   JVD  CHEST/LUNG:   no     rales,     no,    rhonchi  HEART: Regular rate and rhythm;         murmur  ABDOMEN: Soft, Nontender, ;   EXTREMITIES:   no     edema  NEUROLOGY:  alert    LABS:                        8.3    6.04  )-----------( 369      ( 17 Jan 2021 07:35 )             29.3           PT/INR - ( 17 Jan 2021 07:36 )   PT: 29.5 sec;   INR: 2.57 ratio                         Thyroid Stimulating Hormone, Serum: 1.630 uIU/mL (01-02 @ 06:57)          Consultant(s) Notes Reviewed:      Care Discussed with Consultants/Other Providers:

## 2021-01-19 LAB
ANION GAP SERPL CALC-SCNC: 10 MMOL/L — SIGNIFICANT CHANGE UP (ref 5–17)
BUN SERPL-MCNC: 79 MG/DL — HIGH (ref 7–23)
CALCIUM SERPL-MCNC: 9.5 MG/DL — SIGNIFICANT CHANGE UP (ref 8.4–10.5)
CHLORIDE SERPL-SCNC: 89 MMOL/L — LOW (ref 96–108)
CO2 SERPL-SCNC: 33 MMOL/L — HIGH (ref 22–31)
CREAT SERPL-MCNC: 1.97 MG/DL — HIGH (ref 0.5–1.3)
GLUCOSE SERPL-MCNC: 91 MG/DL — SIGNIFICANT CHANGE UP (ref 70–99)
HCT VFR BLD CALC: 28.2 % — LOW (ref 34.5–45)
HGB BLD-MCNC: 8.3 G/DL — LOW (ref 11.5–15.5)
INR BLD: 2.93 RATIO — HIGH (ref 0.88–1.16)
MAGNESIUM SERPL-MCNC: 2.4 MG/DL — SIGNIFICANT CHANGE UP (ref 1.6–2.6)
MCHC RBC-ENTMCNC: 25.9 PG — LOW (ref 27–34)
MCHC RBC-ENTMCNC: 29.4 GM/DL — LOW (ref 32–36)
MCV RBC AUTO: 88.1 FL — SIGNIFICANT CHANGE UP (ref 80–100)
NRBC # BLD: 0 /100 WBCS — SIGNIFICANT CHANGE UP (ref 0–0)
PLATELET # BLD AUTO: 318 K/UL — SIGNIFICANT CHANGE UP (ref 150–400)
POTASSIUM SERPL-MCNC: 4.9 MMOL/L — SIGNIFICANT CHANGE UP (ref 3.5–5.3)
POTASSIUM SERPL-SCNC: 4.9 MMOL/L — SIGNIFICANT CHANGE UP (ref 3.5–5.3)
PROTHROM AB SERPL-ACNC: 33.4 SEC — HIGH (ref 10.6–13.6)
RBC # BLD: 3.2 M/UL — LOW (ref 3.8–5.2)
RBC # FLD: 16.7 % — HIGH (ref 10.3–14.5)
SODIUM SERPL-SCNC: 132 MMOL/L — LOW (ref 135–145)
WBC # BLD: 5.75 K/UL — SIGNIFICANT CHANGE UP (ref 3.8–10.5)
WBC # FLD AUTO: 5.75 K/UL — SIGNIFICANT CHANGE UP (ref 3.8–10.5)

## 2021-01-19 PROCEDURE — 93010 ELECTROCARDIOGRAM REPORT: CPT | Mod: 77

## 2021-01-19 PROCEDURE — 99232 SBSQ HOSP IP/OBS MODERATE 35: CPT

## 2021-01-19 PROCEDURE — 93010 ELECTROCARDIOGRAM REPORT: CPT

## 2021-01-19 RX ORDER — METOPROLOL TARTRATE 50 MG
25 TABLET ORAL DAILY
Refills: 0 | Status: DISCONTINUED | OUTPATIENT
Start: 2021-01-19 | End: 2021-01-20

## 2021-01-19 RX ORDER — WARFARIN SODIUM 2.5 MG/1
4 TABLET ORAL ONCE
Refills: 0 | Status: COMPLETED | OUTPATIENT
Start: 2021-01-19 | End: 2021-01-19

## 2021-01-19 RX ORDER — ACETAMINOPHEN 500 MG
650 TABLET ORAL ONCE
Refills: 0 | Status: COMPLETED | OUTPATIENT
Start: 2021-01-19 | End: 2021-01-19

## 2021-01-19 RX ADMIN — Medication 2 SPRAY(S): at 05:54

## 2021-01-19 RX ADMIN — Medication 1 TABLET(S): at 17:45

## 2021-01-19 RX ADMIN — Medication 2 SPRAY(S): at 17:44

## 2021-01-19 RX ADMIN — Medication 1 TABLET(S): at 05:54

## 2021-01-19 RX ADMIN — BUMETANIDE 2 MILLIGRAM(S): 0.25 INJECTION INTRAMUSCULAR; INTRAVENOUS at 17:45

## 2021-01-19 RX ADMIN — Medication 2 SPRAY(S): at 13:04

## 2021-01-19 RX ADMIN — WARFARIN SODIUM 4 MILLIGRAM(S): 2.5 TABLET ORAL at 21:54

## 2021-01-19 RX ADMIN — Medication 2 SPRAY(S): at 00:55

## 2021-01-19 RX ADMIN — Medication 100 MILLIGRAM(S): at 17:46

## 2021-01-19 RX ADMIN — BUDESONIDE AND FORMOTEROL FUMARATE DIHYDRATE 2 PUFF(S): 160; 4.5 AEROSOL RESPIRATORY (INHALATION) at 05:54

## 2021-01-19 RX ADMIN — Medication 1 APPLICATION(S): at 05:54

## 2021-01-19 RX ADMIN — Medication 650 MILLIGRAM(S): at 21:54

## 2021-01-19 RX ADMIN — BUDESONIDE AND FORMOTEROL FUMARATE DIHYDRATE 2 PUFF(S): 160; 4.5 AEROSOL RESPIRATORY (INHALATION) at 17:44

## 2021-01-19 RX ADMIN — SIMVASTATIN 10 MILLIGRAM(S): 20 TABLET, FILM COATED ORAL at 21:54

## 2021-01-19 RX ADMIN — BUMETANIDE 2 MILLIGRAM(S): 0.25 INJECTION INTRAMUSCULAR; INTRAVENOUS at 05:54

## 2021-01-19 RX ADMIN — Medication 1 APPLICATION(S): at 17:45

## 2021-01-19 NOTE — PROGRESS NOTE ADULT - ASSESSMENT
70 years      h/o   CAD,  mechanical MVR and Tricuspid valve repair,        h/o  chronic   HFpEF,   HTN  . MIGUEL ,     severe MR  ,  s/p mechanical MVR 1999 , [facility] with Dr. Anderson,  and severe TR s/p TVR 2012 ([facility]),       COPD ,on home 02; no prior tobacco use      AFib on Coumadin , failed  ablation,   ,  PHTN,   CKD 3 (b/l SCr ~1.3-1.6),  MIGUEL and gout.     h/o   hypercarbia and she reports  needing Bipap,in the past,  but hasn't used in 10 years.      Morbid  obesity,  BMI is 41     presented with  CP,  dizziness and sob     She also had epistaxis at [facility],/    1.,  Atrial fibrillation       metoprolol ER 25 mg  / lipitor     2.  MVR  mechanical,1999   was  on  IV heparin and warfarin with goal INR of 2.5-3.5 with mechanical MVR   daily INR   3.  TVR, 2012 at [facility]   4.  HTN, on Toprol   5. Dizziness , since resolved  Ct head , with infarcts, Right b. ganglia and Right cerebellum   6. .  Anemia,  has  c/c  anemia, , baseline is  8  to 9/   last colonoscopy  was  10 yrs  ago/ s/p melena last yr. and, no w/p was done.,as  pt was  deemed high risk  for  any procedure  7.  Acute  Diastolic  Chf    was on  iv bumex bid       daily weights/ follow   I/O//   still  with sob on exertion  8.  Severe Pulm Htn, on echo  Echo  12/20,normal  ef/mod  MS/  severe Pulm Htn  per card dr mcnamara , pt   transferred to  [place]   for ELIZABETH/ ? fistula  from aorta  to RV/     s/p epistaxis  seen by ent  heart  failure team following pt   echo from 1/8.  MVR/  decreased  RV function/ no pfo/vsd  pe  ENT, packing removal on 1/12  Mechanical MVR./  on coumadin  . bumex  increased  to 2 mg bid/  pt has lost 4 Kg         f/p  with pmd/ dr gomez/   per  care  cortn note,  Trilogy approved  inr  pending/  s/p  epistaxis  on 1/17 and on 1/18 , , seen by ent, gas  nasal  packing  await labs  today       < from: Transthoracic Echocardiogram (01.08.21 @ 15:53) >  Conclusions:  1. Mechanical prosthetic mitral valve replacement. Peak  mitral valve gradient equals 15 mm Hg, mean transmitral  valve gradient equals 6 mm Hg, which is elevated even in  the setting of a mechanical prosthetic mitral valve  replacement (Heart rate 69 bpm).  2. Refer to prior echos for assesssment of LV function.  Septal motion consistent with cardiac surgery, right  ventricular overload.  3. Right atrial enlargement.  4. Right ventricular enlargement with decreased right  ventricular systolic function.  5. An annuloplasty ring is seen in the tricuspid position.  PG 3 mm hg, MG 1 mm hg. No tricuspid regurgitation.  6. Agitated saline injection demonstrates evidence of a  patent foramen ovale/VSD.  No color Doppler evidence of  < end of copied text >     < from: CT Head No Cont (12.29.20 @ 20:41)   IMPRESSION: No acute intracranial hemorrhage. Age-indeterminate right basal ganglia lacunar infarct and right cerebellar lacunar infarct. If there is clinical suspicion for acute infarct, consider brain MRI if there is no contraindication.  < end of copied text >    < from: TTE Echo Complete w/ Contrast w/ Doppler (12.31.20 @ 14:01) >  ummary:   1. Left ventricular ejection fraction, by visual estimation, is 60 to 65%.   2. Technically suboptimal study.   3. Normal global left ventricular systolic function.   4. Normal left ventricular internal cavity size.   5. Spectral Doppler shows pseudonormal pattern of left ventricular myocardial filling (Grade II diastolic dysfunction).   6. Normal right ventricular size and function.   7. Mild to moderately enlarged left atrium.   8. There is no evidence of pericardial effusion.   9. Mild mitral annular calcification.  10. Mild mitral valve regurgitation.  11. Mild thickening and calcification of the anterior and posterior mitral valve leaflets.  12. Peak transmitral mean gradient equals 6.8 mmHg, calculated mitral valve area by pressure half time equals 1.86 cm² consistent withmild mitral stenosis.  13. Mild tricuspid regurgitation.  14. Mild aortic regurgitation.  15. Sclerotic aortic valve with normal opening.  16. Estimated pulmonary artery systolic pressure is 68.4 mmHg assuming a right atrial pressure of 8 mmHg, whichis consistent with severe pulmonary hypertension.  17. C/w the study of 6-21-20, more significant pulmonary htn is now noted.  18. Endocardial visualization was enhanced with intravenous echo contrast.  19. Mitral valve mean gradient is 6.8 mmHg consistent with moderate mitral stenosis.  Montana Barreto MD FACC, FASE, FAC  < end of copied text >                	   70 years      h/o   CAD,  mechanical MVR and Tricuspid valve repair,        h/o  chronic   HFpEF,   HTN  . MIGUEL ,     severe MR  ,  s/p mechanical MVR 1999 , [facility] with Dr. Anderson,  and severe TR s/p TVR 2012 ([facility]),       COPD ,on home 02; no prior tobacco use      AFib on Coumadin , failed  ablation,   ,  PHTN,   CKD 3 (b/l SCr ~1.3-1.6),  MIGUEL and gout.     h/o   hypercarbia and she reports  needing Bipap,in the past,  but hasn't used in 10 years.      Morbid  obesity,  BMI is 41     presented with  CP,  dizziness and sob     She also had epistaxis at [facility],/    1.,  Atrial fibrillation       metoprolol ER 25 mg  / lipitor     2.  MVR  mechanical,1999   was  on  IV heparin and warfarin with goal INR of 2.5-3.5 with mechanical MVR   daily INR   3.  TVR, 2012 at [facility]   4.  HTN, on Toprol   5. Dizziness , since resolved  Ct head , with infarcts, Right b. ganglia and Right cerebellum   6. .  Anemia,  has  c/c  anemia, , baseline is  8  to 9/   last colonoscopy  was  10 yrs  ago/ s/p melena last yr. and, no w/p was done.,as  pt was  deemed high risk  for  any procedure  7.  Acute  Diastolic  Chf    was on  iv bumex bid       daily weights/ follow   I/O//   still  with sob on exertion  8.  Severe Pulm Htn, on echo  Echo  12/20,normal  ef/mod  MS/  severe Pulm Htn  per card dr mcnamara , pt   transferred to  [place]   for ELIZABETH/ ? fistula  from aorta  to RV/     s/p epistaxis  seen by ent  heart  failure team following pt   echo from 1/8.  MVR/  decreased  RV function/ no pfo/vsd  pe  ENT, packing removal on 1/12  Mechanical MVR./  on coumadin  . bumex  increased  to 2 mg bid/  pt has lost 4 Kg         f/p  with pmd/ dr gomez/   per  care  cortn note,  Trilogy approved  inr  pending/  s/p  epistaxis  on 1/17 and on 1/18 , , seen by ent, gas  nasal  packing  inr  is therapeutic     d/c  when cleared by card       < from: Transthoracic Echocardiogram (01.08.21 @ 15:53) >  Conclusions:  1. Mechanical prosthetic mitral valve replacement. Peak  mitral valve gradient equals 15 mm Hg, mean transmitral  valve gradient equals 6 mm Hg, which is elevated even in  the setting of a mechanical prosthetic mitral valve  replacement (Heart rate 69 bpm).  2. Refer to prior echos for assesssment of LV function.  Septal motion consistent with cardiac surgery, right  ventricular overload.  3. Right atrial enlargement.  4. Right ventricular enlargement with decreased right  ventricular systolic function.  5. An annuloplasty ring is seen in the tricuspid position.  PG 3 mm hg, MG 1 mm hg. No tricuspid regurgitation.  6. Agitated saline injection demonstrates evidence of a  patent foramen ovale/VSD.  No color Doppler evidence of  < end of copied text >     < from: CT Head No Cont (12.29.20 @ 20:41)   IMPRESSION: No acute intracranial hemorrhage. Age-indeterminate right basal ganglia lacunar infarct and right cerebellar lacunar infarct. If there is clinical suspicion for acute infarct, consider brain MRI if there is no contraindication.  < end of copied text >    < from: TTE Echo Complete w/ Contrast w/ Doppler (12.31.20 @ 14:01) >  ummary:   1. Left ventricular ejection fraction, by visual estimation, is 60 to 65%.   2. Technically suboptimal study.   3. Normal global left ventricular systolic function.   4. Normal left ventricular internal cavity size.   5. Spectral Doppler shows pseudonormal pattern of left ventricular myocardial filling (Grade II diastolic dysfunction).   6. Normal right ventricular size and function.   7. Mild to moderately enlarged left atrium.   8. There is no evidence of pericardial effusion.   9. Mild mitral annular calcification.  10. Mild mitral valve regurgitation.  11. Mild thickening and calcification of the anterior and posterior mitral valve leaflets.  12. Peak transmitral mean gradient equals 6.8 mmHg, calculated mitral valve area by pressure half time equals 1.86 cm² consistent withmild mitral stenosis.  13. Mild tricuspid regurgitation.  14. Mild aortic regurgitation.  15. Sclerotic aortic valve with normal opening.  16. Estimated pulmonary artery systolic pressure is 68.4 mmHg assuming a right atrial pressure of 8 mmHg, whichis consistent with severe pulmonary hypertension.  17. C/w the study of 6-21-20, more significant pulmonary htn is now noted.  18. Endocardial visualization was enhanced with intravenous echo contrast.  19. Mitral valve mean gradient is 6.8 mmHg consistent with moderate mitral stenosis.  Montana Barreto MD FACC, FASE, FAC  < end of copied text >                	   70 years      h/o   CAD,  mechanical MVR and Tricuspid valve repair,        h/o  chronic   HFpEF,   HTN  . MIGUEL ,     severe MR  ,  s/p mechanical MVR 1999 , [facility] with Dr. Anderson,  and severe TR s/p TVR 2012 ([facility]),       COPD ,on home 02; no prior tobacco use      AFib on Coumadin , failed  ablation,   ,  PHTN,   CKD 3 (b/l SCr ~1.3-1.6),  MIGUEL and gout.     h/o   hypercarbia and she reports  needing Bipap,in the past,  but hasn't used in 10 years.      Morbid  obesity,  BMI is 41     presented with  CP,  dizziness and sob     She also had epistaxis at [facility],/    1.,  Atrial fibrillation       metoprolol ER 25 mg  / lipitor     2.  MVR  mechanical,1999   was  on  IV heparin and warfarin with goal INR of 2.5-3.5 with mechanical MVR   daily INR   3.  TVR, 2012 at [facility]   4.  HTN, on Toprol   5. Dizziness , since resolved  Ct head , with infarcts, Right b. ganglia and Right cerebellum   6. .  Anemia,  has  c/c  anemia, , baseline is  8  to 9/   last colonoscopy  was  10 yrs  ago/ s/p melena last yr. and, no w/p was done.,as  pt was  deemed high risk  for  any procedure  7.  Acute  Diastolic  Chf    was on  iv bumex bid       daily weights/ follow   I/O//   still  with sob on exertion  8.  Severe Pulm Htn, on echo  Echo  12/20,normal  ef/mod  MS/  severe Pulm Htn  per fredrick mcnamara , pt   transferred to  [place]   for ELIZABETH/ ? fistula  from aorta  to RV/     s/p epistaxis  seen by ent  heart  failure team following pt   echo from 1/8.  MVR/  decreased  RV function/ no pfo/vsd  pe  ENT, packing removal on 1/12  Mechanical MVR./  on coumadin  . bumex  increased  to 2 mg bid/  pt has lost 4 Kg         f/p  with pmd/ dr gomez/   per  care  cortn note,  Trilogy approved  inr  pending/  s/p  epistaxis  on 1/17 and on 1/18 , , seen by ent, gas  nasal  packing right nostril  inr  is therapeutic   4  mg coumadin today    d/c  when cleared by card    \upon  d/c. pt  t ostart 5 mg qd, starting tomorrow   pt to get her coumadin here, spoke with RN and  NP       < from: Transthoracic Echocardiogram (01.08.21 @ 15:53) >  Conclusions:  1. Mechanical prosthetic mitral valve replacement. Peak  mitral valve gradient equals 15 mm Hg, mean transmitral  valve gradient equals 6 mm Hg, which is elevated even in  the setting of a mechanical prosthetic mitral valve  replacement (Heart rate 69 bpm).  2. Refer to prior echos for assesssment of LV function.  Septal motion consistent with cardiac surgery, right  ventricular overload.  3. Right atrial enlargement.  4. Right ventricular enlargement with decreased right  ventricular systolic function.  5. An annuloplasty ring is seen in the tricuspid position.  PG 3 mm hg, MG 1 mm hg. No tricuspid regurgitation.  6. Agitated saline injection demonstrates evidence of a  patent foramen ovale/VSD.  No color Doppler evidence of  < end of copied text >     < from: CT Head No Cont (12.29.20 @ 20:41)   IMPRESSION: No acute intracranial hemorrhage. Age-indeterminate right basal ganglia lacunar infarct and right cerebellar lacunar infarct. If there is clinical suspicion for acute infarct, consider brain MRI if there is no contraindication.  < end of copied text >    < from: TTE Echo Complete w/ Contrast w/ Doppler (12.31.20 @ 14:01) >  ummary:   1. Left ventricular ejection fraction, by visual estimation, is 60 to 65%.   2. Technically suboptimal study.   3. Normal global left ventricular systolic function.   4. Normal left ventricular internal cavity size.   5. Spectral Doppler shows pseudonormal pattern of left ventricular myocardial filling (Grade II diastolic dysfunction).   6. Normal right ventricular size and function.   7. Mild to moderately enlarged left atrium.   8. There is no evidence of pericardial effusion.   9. Mild mitral annular calcification.  10. Mild mitral valve regurgitation.  11. Mild thickening and calcification of the anterior and posterior mitral valve leaflets.  12. Peak transmitral mean gradient equals 6.8 mmHg, calculated mitral valve area by pressure half time equals 1.86 cm² consistent withmild mitral stenosis.  13. Mild tricuspid regurgitation.  14. Mild aortic regurgitation.  15. Sclerotic aortic valve with normal opening.  16. Estimated pulmonary artery systolic pressure is 68.4 mmHg assuming a right atrial pressure of 8 mmHg, whichis consistent with severe pulmonary hypertension.  17. C/w the study of 6-21-20, more significant pulmonary htn is now noted.  18. Endocardial visualization was enhanced with intravenous echo contrast.  19. Mitral valve mean gradient is 6.8 mmHg consistent with moderate mitral stenosis.  Montana Barreto MD FACC, ANGELA, FAC  < end of copied text >

## 2021-01-19 NOTE — CHART NOTE - NSCHARTNOTEFT_GEN_A_CORE
Episodic Note    Notified by RN and telemetry, that patient appears to be in junctional rhythm, but also with a heart rate in the 50's. Patient seen and evaluated at bedside. Patient denies any acute chest pain, palpitations, nausea, vomiting, or SOB.    Vital Signs Last 24 Hrs  T(C): 37.3 (19 Jan 2021 04:30), Max: 37.5 (18 Jan 2021 21:07)  T(F): 99.2 (19 Jan 2021 04:30), Max: 99.5 (18 Jan 2021 21:07)  HR: 76 (19 Jan 2021 04:30) (60 - 79)  BP: 154/63 (19 Jan 2021 04:30) (135/69 - 162/74)  RR: 18 (19 Jan 2021 04:30) (18 - 20)  SpO2: 98% (19 Jan 2021 04:30) (93% - 98%)    PHYSICAL EXAM:  CONSTITUTIONAL: NAD  CHEST/LUNG:   no rales, no rhonchi  HEART: S1 S2, Heart Rate in the 50's  ABDOMEN: Soft, Nontender  NEUROLOGY:  Alert    ASSESSMENT/PLAN:  HPI:HPI:71 yo female with a PMHx of CAD, HFpEF (EF 65-60%), severe MR s/p mechanical MVR 1999 (Dr. Anderson, [facility]), severe TR s/p mechanical TVR (2012 [facility]), afib s/p ablation, HTN, HLD, CKD (bl 1.35-1.5), COPD on 3L NC at home, MIGUEL who presented to the [facility] for cp, sob, dizziness x 1 week, found to be in decompensated HFpEF, w/ tachy-ofelia rhythm. Seen by Dr. Thrasher Cardiology at [facility]. s/p IV diuresis w/ lasix BID. TTE 12/31 reviewed by [facility] Dr. Escobar, c/f possible L-R shunt, ?fistula between aortic valve and RV. Patient transferred to [facility] for ELIZABETH and escalation of care. Now presents with possible junctional rhythm, but also with a heart rate in the 50's, asymptomatic.     1. IMPRESSION: Possible junctional rhythm, but also with a heart rate in the 50's, asymptomatic  > EKG ordered STAT.   > Consider rescheduling AM Metoprolol if heart rate remains in the 50's.  > BMP, serum Magnesium, and serum Phosphorous ordered for the AM.  [facility] to f/u on the above and replete as necessary.   > Continue to monitor patient on telemetry.   > Continue to monitor patient and vitals closely.   > Will endorse to the [facility] ([facility]) day provider to f/u on the above and manage accordingly.   > F/u with the primary care team in the AM.     RN aware of the above management plan.     Cassidy Gonzalez PA-C  Department of Medicine   #[id number] Episodic Note    Notified by RN and telemetry, that patient appears to be in junctional rhythm, but also with a heart rate in the 50's. Patient seen and evaluated at bedside. Patient denies any acute chest pain, palpitations, nausea, vomiting, or SOB.    Vital Signs Last 24 Hrs  T(C): 37.3 (19 Jan 2021 04:30), Max: 37.5 (18 Jan 2021 21:07)  T(F): 99.2 (19 Jan 2021 04:30), Max: 99.5 (18 Jan 2021 21:07)  HR: 76 (19 Jan 2021 04:30) (60 - 79)  BP: 154/63 (19 Jan 2021 04:30) (135/69 - 162/74)  RR: 18 (19 Jan 2021 04:30) (18 - 20)  SpO2: 98% (19 Jan 2021 04:30) (93% - 98%)    PHYSICAL EXAM:  CONSTITUTIONAL: NAD  CHEST/LUNG:   no rales, no rhonchi  HEART: S1 S2, Heart Rate in the 50's  ABDOMEN: Soft, Nontender  NEUROLOGY:  Alert    ASSESSMENT/PLAN:  HPI:HPI:71 yo female with a PMHx of CAD, HFpEF (EF 65-60%), severe MR s/p mechanical MVR 1999 (Dr. Anderson, [facility]), severe TR s/p mechanical TVR (2012 [facility]), afib s/p ablation, HTN, HLD, CKD (bl 1.35-1.5), COPD on 3L NC at home, MIGUEL who presented to the [facility] for cp, sob, dizziness x 1 week, found to be in decompensated HFpEF, w/ tachy-ofelia rhythm. Seen by Dr. Thrasher Cardiology at [facility]. s/p IV diuresis w/ lasix BID. TTE 12/31 reviewed by [facility] Dr. Escobar, c/f possible L-R shunt, ?fistula between aortic valve and RV. Patient transferred to [facility] for ELIZABETH and escalation of care. Now presents with possible junctional rhythm, but also with a heart rate in the 50's, asymptomatic.     1. IMPRESSION: Possible junctional rhythm, but also with a heart rate in the 50's, asymptomatic  > EKG ordered STAT.  - EKG showed known atrial fibrillation. EKG on 1/19/2021 at 6:06:04 froylan reviewed with Dr. Turpin. Per Dr. Turpin, have the day team call house cardiology. [facility] provider to call the consult and manage accordingly.  > Consider rescheduling AM Metoprolol if heart rate remains in the 50's. Per Dr. uTrpin, change the parameters of the Metoprolol to be held if heart rate less than 55 bpm. RN aware.   > BMP, serum Magnesium, and serum Phosphorous ordered for the AM.  Will endorse to the [facility] ([facility]) day provider to f/u on the above and replete as necessary.   > Continue to monitor patient on telemetry.   > Continue to monitor patient and vitals closely.   > Will endorse to the [facility]) day provider to f/u on the above and manage accordingly.   > F/u with the primary care team in the AM.     RN aware of the above management plan. Dr. Turpin aware of the above patient's status with her instructions above.     Cassidy Gonzalez PA-C  Department of Medicine   #[id number] Episodic Note    Notified by RN and telemetry, that patient appears to be in junctional rhythm, but also with a heart rate in the 50's. Patient seen and evaluated at bedside. Patient denies any acute chest pain, palpitations, nausea, vomiting, or SOB.    Vital Signs Last 24 Hrs  T(C): 37.3 (19 Jan 2021 04:30), Max: 37.5 (18 Jan 2021 21:07)  T(F): 99.2 (19 Jan 2021 04:30), Max: 99.5 (18 Jan 2021 21:07)  HR: 76 (19 Jan 2021 04:30) (60 - 79)  BP: 154/63 (19 Jan 2021 04:30) (135/69 - 162/74)  RR: 18 (19 Jan 2021 04:30) (18 - 20)  SpO2: 98% (19 Jan 2021 04:30) (93% - 98%)    PHYSICAL EXAM:  CONSTITUTIONAL: NAD  CHEST/LUNG:   no rales, no rhonchi  HEART: S1 S2, Heart Rate in the 50's  ABDOMEN: Soft, Nontender  NEUROLOGY:  Alert    ASSESSMENT/PLAN:  HPI:HPI:71 yo female with a PMHx of CAD, HFpEF (EF 65-60%), severe MR s/p mechanical MVR 1999 (Dr. Anderson, [facility]), severe TR s/p mechanical TVR (2012 [facility]), afib s/p ablation, HTN, HLD, CKD (bl 1.35-1.5), COPD on 3L NC at home, MIGUEL who presented to the [facility] for cp, sob, dizziness x 1 week, found to be in decompensated HFpEF, w/ tachy-ofelia rhythm. Seen by Dr. Thrasher Cardiology at [facility]. s/p IV diuresis w/ lasix BID. TTE 12/31 reviewed by [facility] Dr. Escobar, c/f possible L-R shunt, ?fistula between aortic valve and RV. Patient transferred to [facility] for ELIZABETH and escalation of care. Now presents with possible junctional rhythm, but also with a heart rate in the 50's, asymptomatic.     1. IMPRESSION: Possible junctional rhythm, but also with a heart rate in the 50's, asymptomatic  > EKG ordered STAT.  - EKG showed known atrial fibrillation. Patient on Coumadin. EKG on 1/19/2021 at 6:06:04 thoroughly reviewed with Dr. Turpin. Per Dr. Turpin, have the day team call house cardiology. Will endorse to the [facility] ([facility]) provider to call the consult and manage accordingly.  > Consider rescheduling AM Metoprolol if heart rate remains in the 50's. Per Dr. Turpin, change the parameters of the Metoprolol to be held if heart rate less than 55 bpm. RN aware.   > BMP, serum Magnesium, and serum Phosphorous ordered for the AM.  Will endorse to the [facility]) day provider to f/u on the above and replete as necessary.   > Continue to monitor patient on telemetry.   > Continue to monitor patient and vitals closely.   > Will endorse to the [facility]) day provider to f/u on the above and manage accordingly.   > F/u with the primary care team in the AM.     RN aware of the above management plan. Dr. Turpin aware of the above patient's status with her instructions above.     Cassidy Gonzalez PA-C  Department of Medicine   #[id number] Episodic Note    Notified by RN and telemetry, that patient appears to be in junctional rhythm, but also with a heart rate in the 50's. Patient seen and evaluated at bedside. Patient denies any acute chest pain, palpitations, nausea, vomiting, or SOB.    Vital Signs Last 24 Hrs  T(C): 37.3 (19 Jan 2021 04:30), Max: 37.5 (18 Jan 2021 21:07)  T(F): 99.2 (19 Jan 2021 04:30), Max: 99.5 (18 Jan 2021 21:07)  HR: 76 (19 Jan 2021 04:30) (60 - 79)  BP: 154/63 (19 Jan 2021 04:30) (135/69 - 162/74)  RR: 18 (19 Jan 2021 04:30) (18 - 20)  SpO2: 98% (19 Jan 2021 04:30) (93% - 98%)    PHYSICAL EXAM:  CONSTITUTIONAL: NAD  CHEST/LUNG:   no rales, no rhonchi  HEART: S1 S2, Heart Rate in the 50's  ABDOMEN: Soft, Nontender  NEUROLOGY:  Alert    ASSESSMENT/PLAN:  HPI:HPI:71 yo female with a PMHx of CAD, HFpEF (EF 65-60%), severe MR s/p mechanical MVR 1999 (Dr. Anderson, [facility]), severe TR s/p mechanical TVR (2012 [facility]), afib s/p ablation, HTN, HLD, CKD (bl 1.35-1.5), COPD on 3L NC at home, MIGUEL who presented to the [facility] for cp, sob, dizziness x 1 week, found to be in decompensated HFpEF, w/ tachy-ofelia rhythm. Seen by Dr. Thrasher Cardiology at [facility]. s/p IV diuresis w/ lasix BID. TTE 12/31 reviewed by [facility] Dr. Escobar, c/f possible L-R shunt, ?fistula between aortic valve and RV. Patient transferred to [facility] for ELIZABETH and escalation of care. Now presents with possible junctional rhythm, but also with a heart rate in the 50's, asymptomatic.     1. IMPRESSION: Possible junctional rhythm, but also with a heart rate in the 50's, asymptomatic  > EKG ordered STAT.  - EKG showed known atrial fibrillation (HR 60 bpm). Patient on Coumadin. EKG on 1/19/2021 at 6:06:04 thoroughly reviewed with Dr. Turpin. Per Dr. Turpin, have the day team call house cardiology. Will endorse to the [facility] ([facility]) provider to call the consult and manage accordingly.  > Consider rescheduling AM Metoprolol if heart rate remains in the 50's. Per Dr. Turpin, change the parameters of the Metoprolol to be held if heart rate less than 55 bpm. RN aware.   > BMP, serum Magnesium, and serum Phosphorous ordered for the AM.  Will endorse to the [facility]) day provider to f/u on the above and replete as necessary.   > Continue to monitor patient on telemetry.   > Continue to monitor patient and vitals closely.   > Will endorse to the [facility]) day provider to f/u on the above and manage accordingly.   > F/u with the primary care team in the AM.     RN aware of the above management plan. Dr. Turpin aware of the above patient's status with her instructions above.     Cassidy Gonzalez PA-C  Department of Medicine   #[id number] Episodic Note    Notified by RN and telemetry, that patient appears to be in junctional rhythm, but also with a heart rate in the 50's. Patient seen and evaluated at bedside. Patient denies any acute chest pain, palpitations, nausea, vomiting, or SOB.    Vital Signs Last 24 Hrs  T(C): 37.3 (19 Jan 2021 04:30), Max: 37.5 (18 Jan 2021 21:07)  T(F): 99.2 (19 Jan 2021 04:30), Max: 99.5 (18 Jan 2021 21:07)  HR: 76 (19 Jan 2021 04:30) (60 - 79)  BP: 154/63 (19 Jan 2021 04:30) (135/69 - 162/74)  RR: 18 (19 Jan 2021 04:30) (18 - 20)  SpO2: 98% (19 Jan 2021 04:30) (93% - 98%)    PHYSICAL EXAM:  CONSTITUTIONAL: NAD  CHEST/LUNG:   no rales, no rhonchi  HEART: S1 S2  ABDOMEN: Soft, Nontender  NEUROLOGY:  Alert    ASSESSMENT/PLAN:  HPI:HPI:69 yo female with a PMHx of CAD, HFpEF (EF 65-60%), severe MR s/p mechanical MVR 1999 (Dr. Anderson, [facility]), severe TR s/p mechanical TVR (2012 [facility]), afib s/p ablation, HTN, HLD, CKD (bl 1.35-1.5), COPD on 3L NC at home, MIGUEL who presented to the [facility] for cp, sob, dizziness x 1 week, found to be in decompensated HFpEF, w/ tachy-ofelia rhythm. Seen by Dr. Thrasher Cardiology at [facility]. s/p IV diuresis w/ lasix BID. TTE 12/31 reviewed by [facility] Dr. Escobar, c/f possible L-R shunt, ?fistula between aortic valve and RV. Patient transferred to [facility] for ELIZABETH and escalation of care. Now presents with possible junctional rhythm, but also with a heart rate in the 50's, asymptomatic.     1. IMPRESSION: Possible junctional rhythm, but also with a heart rate in the 50's, asymptomatic  > EKG ordered STAT.  - EKG showed known atrial fibrillation (HR 60 bpm). Patient on Coumadin. EKG on 1/19/2021 at 6:06:04 thoroughly reviewed with Dr. Turpin. Per Dr. Turpin, have the day team call house cardiology. Will endorse to the [facility] ([facility]) provider to call the consult and manage accordingly.  > Consider rescheduling AM Metoprolol if heart rate remains in the 50's. Per Dr. Turpin, change the parameters of the Metoprolol to be held if heart rate less than 55 bpm. RN aware.   > BMP, serum Magnesium, and serum Phosphorous ordered for the AM.  Will endorse to the [facility] ([facility]) day provider to f/u on the above and replete as necessary.   > Continue to monitor patient on telemetry.   > Continue to monitor patient and vitals closely.   > Will endorse to the [facility]) day provider to f/u on the above and manage accordingly.   > F/u with the primary care team in the AM.     RN aware of the above management plan. Dr. Turpin aware of the above patient's status with her instructions above.     Cassidy Gonzalez PA-C  Department of Medicine   #[id number]

## 2021-01-19 NOTE — PROVIDER CONTACT NOTE (OTHER) - NAME OF MD/NP/PA/DO NOTIFIED:
Cassidy NEVAREZ
Nhung Browne
Dedrick Elmore, NP
GERI Gonzalez
GLORIA Alegre NP
MARIA DEL CARMEN Zamarripa, PA
Zeferino Gauthier, NP
Zeferino Gauthier, NP
Dedrick Elmore, NP

## 2021-01-19 NOTE — PROVIDER CONTACT NOTE (OTHER) - BACKGROUND
Patient admitted for Chest Pain, Epistaxis, Chronic Kidney Disease, Chronic Obstructive Pulmonary Disease, Atrial Fibrillation, Heart Failure.
Pt admitted for chest pain and epistaxis.
Pt admitted for chest pain and epistaxis.
Pt admitted with nose bleed. Pt on Coumadin and heparin Gtt. Nasal packing removed yesterday.
Pt s/p nosebleed from right nostril with packing. Pt on Coumadin for MVR. Pt's INR: 2.85. Yesterday's H&H: 8.3/29.3.
admitted with chest pain, hx: nose bleed
Pt admitted with c/o CP, SOB, and dizziness.
1/6 decomp CHF/tachybrady/anemia  HX: CHF, COPD, gout, HTN, pulm HTN, afib
Pt. came in with chest pain. PMH mitral valve replacement on coumadin, COPD on 3L home oxygen.

## 2021-01-19 NOTE — PROVIDER CONTACT NOTE (OTHER) - REASON
----- Message from Connie Banks sent at 6/24/2019  8:35 AM CDT -----  Contact: Patient  [phone]  Type: RX Refill Request    Who Called: Patient    Refill or New Rx: Refill    RX Name and Strength: ondansetron (ZOFRAN) 4 MG tablet    Is this a 30 day or 90 day RX: 90 day    Preferred Pharmacy with phone number: .  [facility] [address] [address] [address] [facility] [address] [address] [address]  [facility] [address]  Phone: [phone] Fax: [phone]    Local or Mail Order: local    Would the patient rather a call back or a response via My [facility]? Call back    Best Call Back Number:  [phone]    Additional Information: Patient states that she started up yesterday with nausea and throwing up. Please call in meds.  
Tried calling patient no answer, no voicemail set up.  
O2 Saturation. Heart Rhythm.
Pt blew out of her left nostril a medium size clot.
Pt had PAT for 2.7 seconds with HR up to 170's on telemetry
Pt no longer Afib on telemetry. Pt is now in Sinus rhythm and had PAT for 2.7 seconds with HR up to 150's on telemetry
Pt with brief episode of epistaxis out of left nostril.
Pt with nose bleed to R nostril
pt. with nose bleed, HR in 50s, Toprol XL?
ectopy
Pt. HR ofelia to 44 briefly for less than 5 seconds

## 2021-01-19 NOTE — PROVIDER CONTACT NOTE (OTHER) - DATE AND TIME:
07-Jan-2021 01:00
12-Jan-2021 04:30
09-Jan-2021 17:10
10-Ruddy-2021 01:29
10-Ruddy-2021 03:20
17-Jan-2021 05:45
18-Jan-2021 14:15
19-Jan-2021 05:20
11-Jan-2021 14:30

## 2021-01-19 NOTE — CHART NOTE - NSCHARTNOTEFT_GEN_A_CORE
Spoke with ENT GERI Acevedo, confirms that patient may resume use of BIPAP tonight.  RN informed via order.    Haley Curtis NP  [phone]

## 2021-01-19 NOTE — CHART NOTE - NSCHARTNOTEFT_GEN_A_CORE
Spoke with NP Josey Simms of HF - states that EKGs were reviewed with HF attending, c/w atrial fibrillation not junctional; patient cleared for d/c by HF.  Will discontinue tele at this point.  Anticipate d/c with homecare tomorrow.  d/w Dr. Turpin.    Haley Curtis NP  [phone]

## 2021-01-19 NOTE — PROGRESS NOTE ADULT - SUBJECTIVE AND OBJECTIVE BOX
PULMONARY PROGRESS NOTE    DAMARI GUERRERO  MRN-[id number]    Patient is a 70y old  Female who presents with a chief complaint of chf/ chf team (19 Jan 2021 09:42)      HPI:   still with nasal packing on 2L  denies resp complaints  did not use BPAP last night    ROS:   -    ACTIVE MEDICATION LIST:  MEDICATIONS  (STANDING):  allopurinol 100 milliGRAM(s) Oral daily  amoxicillin  500 milliGRAM(s)/clavulanate 1 Tablet(s) Oral two times a day  BACItracin   Ointment 1 Application(s) Topical two times a day  budesonide 160 MICROgram(s)/formoterol 4.5 MICROgram(s) Inhaler 2 Puff(s) Inhalation two times a day  buMETAnide 2 milliGRAM(s) Oral two times a day  metoprolol succinate ER 25 milliGRAM(s) Oral daily  simvastatin 10 milliGRAM(s) Oral at bedtime  sodium chloride 0.65% Nasal 2 Spray(s) Both Nostrils four times a day  warfarin 4 milliGRAM(s) Oral once    MEDICATIONS  (PRN):      EXAM:  Vital Signs Last 24 Hrs  T(C): 37.3 (19 Jan 2021 04:30), Max: 37.5 (18 Jan 2021 21:07)  T(F): 99.2 (19 Jan 2021 04:30), Max: 99.5 (18 Jan 2021 21:07)  HR: 72 (19 Jan 2021 09:37) (54 - 79)  BP: 154/63 (19 Jan 2021 04:30) (135/69 - 162/74)  BP(mean): --  RR: 18 (19 Jan 2021 04:30) (18 - 20)  SpO2: 98% (19 Jan 2021 09:37) (93% - 98%)    GENERAL: The patient is awake and alert in no apparent distress.     LUNGS: Clear to auscultation without wheezing, rales or rhonchi; respirations unlabored    HEART: Regular rate and rhythm without murmur.                            8.3    5.75  )-----------( 318      ( 19 Jan 2021 06:57 )             28.2       01-19    132<L>  |  89<L>  |  79<H>  ----------------------------<  91  4.9   |  33<H>  |  1.97<H>    Ca    9.5      19 Jan 2021 09:29  Mg     2.4     01-19       rad< from: Xray Chest 1 View- PORTABLE-Urgent (Xray Chest 1 View- PORTABLE-Urgent .) (01.11.21 @ 17:43) >    EXAM:  XR CHEST PORTABLE URGENT 1V                            PROCEDURE DATE:  01/11/2021            INTERPRETATION:  TIME OF EXAM: January 11, 2021 at 5:38 PM.    CLINICAL INFORMATION: COPD. Desaturation on ambulation.    COMPARISON:  December 29,2020.    TECHNIQUE:   AP Portable chest x-ray. Limited by rotation. The chin obscures part of the upper image.    INTERPRETATION:    Heart size and the mediastinum cannot be accurately evaluated on this projection. Median sternotomy sutures, surgicalclips, and prosthetic cardiac valve again seen.  Elevated left hemidiaphragm again seen.  No definite focal lung consolidation seen.  There is continued accentuation of the central pulmonary vascular markings suggesting pulmonary vascular congestion.  No pleural effusion or pneumothorax noted.        IMPRESSION:  Elevated left hemidiaphragm again seen.    Continued accentuation of central pulmonary vascular markings suggesting pulmonary vascular congestion.                ANGELIQUE MICHELLE MD; Attending Radiologist  This document has been electronically signed. Jan 12 2021  9:52AM    < end of copied text >      PROBLEM LIST:  70y Female with HEALTH ISSUES - PROBLEM Dx:  Epistaxis  Epistaxis    Chronic kidney disease (CKD)  Chronic kidney disease (CKD)    Tricuspid valve replaced  Tricuspid valve replaced    Pulmonary hypertension  Pulmonary hypertension    COPD (chronic obstructive pulmonary disease)  COPD (chronic obstructive pulmonary disease)    Mitral valve replaced  Mitral valve replaced    Chronic atrial fibrillation  Chronic atrial fibrillation    Heart failure with preserved ejection fraction  Heart failure with preserved ejection fraction           RECS:  defer to ENT when okay to go back on BPAP  again suggest get her off 02 when awake  f/u cardio  agree with discharge planning with BPAP or AVAPs at home      Please call with any questions.    Irma Eaton, DO  [facility]  [phone]

## 2021-01-19 NOTE — PROGRESS NOTE ADULT - PROBLEM SELECTOR PLAN 1
gram-positive abx coverage for duration of packing placement  - Strict blood pressure control.  - Nasal saline, 2 sprays to both nares 4 times a day  - Avoid nasal trauma; no nose rubbing, blowing or manipulating nasal packing.  Sneeze with mouth open and pinching nares.  - Avoid bending with head blow the waist.    - No heavy lifting. - no need for abx as all dissolvable packing in place  - Strict blood pressure control.  - Nasal saline, 2 sprays to both nares 4 times a day  - Avoid nasal trauma; no nose rubbing, blowing or manipulating nasal packing.  Sneeze with mouth open and pinching nares.  - Avoid bending with head blow the waist.    - No heavy lifting.

## 2021-01-19 NOTE — PROVIDER CONTACT NOTE (OTHER) - ASSESSMENT
BP stable.
Patient's Heart Rhythm is & has been converting back & forth from Normal Sinus Rhythm to Atrial Fibrillation on the cardiac monitor & Heart Rate is 60's - 80's on the cardiac monitor. Patient is asymptomatic. Patient is Alert & Oriented times Four. Patient denies chest pain, discomfort, shortness of breath, dizziness & lightheadedness. Patient's Vital Signs: Temperature 98.5 F Oral, Heart Rate 71, Blood Pressure 117/68, Respiratory Rate 18 and O2 Saturation 97% on O2 3 Liters Nasal Cannula. Physical Therapist reported that while working with patient, patient's O2 Saturation decreased to 86% with activity while patient was on O2 3 Liters Nasal Cannula. Physical Therapist added that with rest patient's O2 Saturation increased to above 90%. Patient assessed & patient's Respiratory Rate 18 & O2 Saturation 97% on O2 3 Liters Nasal Cannula. Patient states that when her O2 Saturation decreased to 86% she experienced mild shortness of breath & once she rested her shortness of breath resolved.
Pt asymptomatic.
Pt is A&Ox4. VSS. Pt denies any c/o chest pain/discomfort. Pt was asleep at time of ectopy.
Pt is A&Ox4. VSS. Pt denies any c/o chest pain/discomfort. Pt was up using the restroom at time of ectopy.
Pt stable. R nostril with continuous nose bleed. Some blood coming from L side as well.
Pt hemodynamically stable. VS: BP-154/71, HR-67. Pt applying pressure with gauze and cold pack to area.
Asymptomatic, VSS. HR presently in 60's.
Pt. A&Ox4. Pt. denies CP, SOB, lightheadedness, dizziness. Pt. sleeping at time of event. VSS temp 98.3, HR 60, /60, RR 18, pulse ox 99% on 3L NC.

## 2021-01-19 NOTE — PROGRESS NOTE ADULT - SUBJECTIVE AND OBJECTIVE BOX
ENT ISSUE/POD: Right Epistaxis    HPI: 69 yo female with significant cardiac history on coumadin and well known by ENT for epistaxis and multiple packing placements / cauterizations, presents with new episode of right nare epistaxis s/p cautery and packed with dissolvable nasopore and surgicel. Pt denies further bleeding since. INR 2.93            PAST MEDICAL & SURGICAL HISTORY:  COPD (chronic obstructive pulmonary disease)    Hypertension    CHF (congestive heart failure)    Tricuspid valve replaced    Mitral valve replaced    Gout    HTN (hypertension)    CHF (congestive heart failure)    COPD (chronic obstructive pulmonary disease)  on home O2    MIGUEL (obstructive sleep apnea)    Pulmonary hypertension    Atrial fibrillation  S/P Ablation    No significant past surgical history    Tricuspid valve replaced  mechanical    History of mitral valve replacement with mechanical valve      Allergies    No Known Allergies    Intolerances      MEDICATIONS  (STANDING):  allopurinol 100 milliGRAM(s) Oral daily  amoxicillin  500 milliGRAM(s)/clavulanate 1 Tablet(s) Oral two times a day  BACItracin   Ointment 1 Application(s) Topical two times a day  budesonide 160 MICROgram(s)/formoterol 4.5 MICROgram(s) Inhaler 2 Puff(s) Inhalation two times a day  buMETAnide 2 milliGRAM(s) Oral two times a day  metoprolol succinate ER 25 milliGRAM(s) Oral daily  simvastatin 10 milliGRAM(s) Oral at bedtime  sodium chloride 0.65% Nasal 2 Spray(s) Both Nostrils four times a day  warfarin 4 milliGRAM(s) Oral once    MEDICATIONS  (PRN):      Social History: see consult note    Family history: see consult note    ROS:   ENT: all negative except as noted in HPI   Pulm: denies SOB, cough, hemoptysis  Neuro: denies numbness/tingling, loss of sensation  Endo: denies heat/cold intolerance, excessive sweating      Vital Signs Last 24 Hrs  T(C): 37.3 (19 Jan 2021 04:30), Max: 37.5 (18 Jan 2021 21:07)  T(F): 99.2 (19 Jan 2021 04:30), Max: 99.5 (18 Jan 2021 21:07)  HR: 72 (19 Jan 2021 09:37) (54 - 79)  BP: 154/63 (19 Jan 2021 04:30) (135/69 - 162/74)  BP(mean): --  RR: 18 (19 Jan 2021 04:30) (18 - 20)  SpO2: 98% (19 Jan 2021 09:37) (93% - 98%)                          8.3    5.75  )-----------( 318      ( 19 Jan 2021 06:57 )             28.2         PT/INR - ( 19 Jan 2021 06:45 )   PT: 33.4 sec;   INR: 2.93 ratio             PHYSICAL EXAM:  Gen: NAD  Skin: No rashes, bruises, or lesions  Head: Normocephalic, Atraumatic  Face: no edema, erythema, or fluctuance. Parotid glands soft without mass  Eyes: no scleral injection  Nose: Right nare: packed with nasopore wrapped in surgicel coated in bacitracin Left nare: no active bleeding or discharge.   Mouth: No Stridor / Drooling / Trismus.  Mucosa moist, tongue/uvula midline, oropharynx clear with no blood in posterior oropharynx  Neck: Flat, supple, no lymphadenopathy, trachea midline, no masses  Lymphatic: No lymphadenopathy  Resp: breathing easily, no stridor  Neuro: facial nerve intact, no facial droop

## 2021-01-19 NOTE — PROVIDER CONTACT NOTE (OTHER) - ACTION/TREATMENT ORDERED:
NP notified and aware. No intervention at this time.
PA aware & assessed patient & reviewed all orders, labs, history & plan. PA ordered 12-Lead ECG & reviewed 12-Lead ECG Results. PA ordered to maintain patient on O2 3 Liters Nasal Cannula.
ENT to evaluate, reschedule Toprol XL for later in AM
NP aware. Call ENT to come see PT.
Will closely monitor the pt and CBC draw in AM.
Zeferino DEL ROSARIO aware. will continue to monitor pt.
Zeferino DEL ROSARIO aware. will continue to monitor pt.
ENT reconsulted. Afrin ordered,
PA to come and assess patient.

## 2021-01-19 NOTE — PROVIDER CONTACT NOTE (OTHER) - SITUATION
Notified by tele that patient is having episodes of junctional rhythm on tele with HR 50's.
Pt. HR ofelia to 44 briefly for less than 5 seconds
Physical Therapist reported that patient's O2 Saturation decreased to 86% with activity. Patient's Heart Rhythm is & has been converting back & froth from Normal Sinus Rhythm to Atrial Fibrillation.
Pt with brief episode of epistaxis out of left nostril.
Pt had PAT for 2.7 seconds with HR up to 170's on telemetry
Pt no longer Afib on telemetry. Pt is now in Sinus rhythm and had PAT for 2.7 seconds with HR up to 150's on telemetry.
Pt with nose bleed to R nostril
pt. with nose bleed, HR in 50s, Toprol XL?

## 2021-01-19 NOTE — PROGRESS NOTE ADULT - SUBJECTIVE AND OBJECTIVE BOX
s/p  epistaxis  yesterday    REVIEW OF SYSTEMS:  GEN: no fever,    no chills  RESP: no SOB,   no cough  CVS: no chest pain,   no palpitations  GI: no abdominal pain,   no nausea,   no vomiting,   no constipation,   no diarrhea  : no dysuria,   no frequency  NEURO: no headache,   no dizziness  PSYCH: no depression,   not anxious  Derm : no rash    MEDICATIONS  (STANDING):  allopurinol 100 milliGRAM(s) Oral daily  amoxicillin  500 milliGRAM(s)/clavulanate 1 Tablet(s) Oral two times a day  BACItracin   Ointment 1 Application(s) Topical two times a day  budesonide 160 MICROgram(s)/formoterol 4.5 MICROgram(s) Inhaler 2 Puff(s) Inhalation two times a day  buMETAnide 2 milliGRAM(s) Oral two times a day  metoprolol succinate ER 25 milliGRAM(s) Oral daily  simvastatin 10 milliGRAM(s) Oral at bedtime  sodium chloride 0.65% Nasal 2 Spray(s) Both Nostrils four times a day    MEDICATIONS  (PRN):      Vital Signs Last 24 Hrs  T(C): 37.3 (19 Jan 2021 04:30), Max: 37.5 (18 Jan 2021 21:07)  T(F): 99.2 (19 Jan 2021 04:30), Max: 99.5 (18 Jan 2021 21:07)  HR: 76 (19 Jan 2021 04:30) (63 - 79)  BP: 154/63 (19 Jan 2021 04:30) (135/69 - 162/74)  BP(mean): --  RR: 18 (19 Jan 2021 04:30) (18 - 20)  SpO2: 98% (19 Jan 2021 04:30) (93% - 98%)  CAPILLARY BLOOD GLUCOSE        I&O's Summary    17 Jan 2021 07:01  -  18 Jan 2021 07:00  --------------------------------------------------------  IN: 1000 mL / OUT: 2700 mL / NET: -1700 mL    18 Jan 2021 07:01  -  19 Jan 2021 05:48  --------------------------------------------------------  IN: 560 mL / OUT: 800 mL / NET: -240 mL        PHYSICAL EXAM:  HEAD:  Atraumatic, Normocephalic  NECK: Supple, No   JVD  CHEST/LUNG:   no     rales,     no,    rhonchi  HEART: Regular rate and rhythm;         murmur  ABDOMEN: Soft, Nontender, ;   EXTREMITIES:   no     edema  NEUROLOGY:  alert    LABS:                        8.3    6.04  )-----------( 369      ( 17 Jan 2021 07:35 )             29.3           PT/INR - ( 18 Jan 2021 07:26 )   PT: 32.5 sec;   INR: 2.85 ratio                         Thyroid Stimulating Hormone, Serum: 1.630 uIU/mL (01-02 @ 06:57)          Consultant(s) Notes Reviewed:      Care Discussed with Consultants/Other Providers:      s/p  epistaxis  yesterday  tele, junctional  at 54 to  60    REVIEW OF SYSTEMS:  GEN: no fever,    no chills  RESP: no SOB,   no cough  CVS: no chest pain,   no palpitations  GI: no abdominal pain,   no nausea,   no vomiting,   no constipation,   no diarrhea  : no dysuria,   no frequency  NEURO: no headache,   no dizziness  PSYCH: no depression,   not anxious  Derm : no rash    MEDICATIONS  (STANDING):  allopurinol 100 milliGRAM(s) Oral daily  amoxicillin  500 milliGRAM(s)/clavulanate 1 Tablet(s) Oral two times a day  BACItracin   Ointment 1 Application(s) Topical two times a day  budesonide 160 MICROgram(s)/formoterol 4.5 MICROgram(s) Inhaler 2 Puff(s) Inhalation two times a day  buMETAnide 2 milliGRAM(s) Oral two times a day  metoprolol succinate ER 25 milliGRAM(s) Oral daily  simvastatin 10 milliGRAM(s) Oral at bedtime  sodium chloride 0.65% Nasal 2 Spray(s) Both Nostrils four times a day    MEDICATIONS  (PRN):      Vital Signs Last 24 Hrs  T(C): 37.3 (19 Jan 2021 04:30), Max: 37.5 (18 Jan 2021 21:07)  T(F): 99.2 (19 Jan 2021 04:30), Max: 99.5 (18 Jan 2021 21:07)  HR: 76 (19 Jan 2021 04:30) (63 - 79)  BP: 154/63 (19 Jan 2021 04:30) (135/69 - 162/74)  BP(mean): --  RR: 18 (19 Jan 2021 04:30) (18 - 20)  SpO2: 98% (19 Jan 2021 04:30) (93% - 98%)  CAPILLARY BLOOD GLUCOSE        I&O's Summary    17 Jan 2021 07:01  -  18 Jan 2021 07:00  --------------------------------------------------------  IN: 1000 mL / OUT: 2700 mL / NET: -1700 mL    18 Jan 2021 07:01  -  19 Jan 2021 05:48  --------------------------------------------------------  IN: 560 mL / OUT: 800 mL / NET: -240 mL        PHYSICAL EXAM:  HEAD:  Atraumatic, Normocephalic  NECK: Supple, No   JVD  CHEST/LUNG:   no     rales,     no,    rhonchi  HEART: Regular rate and rhythm;         murmur  ABDOMEN: Soft, Nontender, ;   EXTREMITIES:   no     edema  NEUROLOGY:  alert    LABS:                        8.3    6.04  )-----------( 369      ( 17 Jan 2021 07:35 )             29.3           PT/INR - ( 18 Jan 2021 07:26 )   PT: 32.5 sec;   INR: 2.85 ratio                         Thyroid Stimulating Hormone, Serum: 1.630 uIU/mL (01-02 @ 06:57)          Consultant(s) Notes Reviewed:      Care Discussed with Consultants/Other Providers:

## 2021-01-20 ENCOUNTER — TRANSCRIPTION ENCOUNTER (OUTPATIENT)
Age: 71
End: 2021-01-20

## 2021-01-20 VITALS — OXYGEN SATURATION: 95 % | HEART RATE: 90 BPM

## 2021-01-20 LAB
ANION GAP SERPL CALC-SCNC: 10 MMOL/L — SIGNIFICANT CHANGE UP (ref 5–17)
BUN SERPL-MCNC: 83 MG/DL — HIGH (ref 7–23)
CALCIUM SERPL-MCNC: 10.2 MG/DL — SIGNIFICANT CHANGE UP (ref 8.4–10.5)
CHLORIDE SERPL-SCNC: 87 MMOL/L — LOW (ref 96–108)
CO2 SERPL-SCNC: 33 MMOL/L — HIGH (ref 22–31)
CREAT SERPL-MCNC: 2 MG/DL — HIGH (ref 0.5–1.3)
GLUCOSE SERPL-MCNC: 99 MG/DL — SIGNIFICANT CHANGE UP (ref 70–99)
INR BLD: 2.71 RATIO — HIGH (ref 0.88–1.16)
POTASSIUM SERPL-MCNC: 4.6 MMOL/L — SIGNIFICANT CHANGE UP (ref 3.5–5.3)
POTASSIUM SERPL-SCNC: 4.6 MMOL/L — SIGNIFICANT CHANGE UP (ref 3.5–5.3)
PROTHROM AB SERPL-ACNC: 31 SEC — HIGH (ref 10.6–13.6)
SODIUM SERPL-SCNC: 130 MMOL/L — LOW (ref 135–145)

## 2021-01-20 PROCEDURE — 97110 THERAPEUTIC EXERCISES: CPT

## 2021-01-20 PROCEDURE — 80048 BASIC METABOLIC PNL TOTAL CA: CPT

## 2021-01-20 PROCEDURE — 84100 ASSAY OF PHOSPHORUS: CPT

## 2021-01-20 PROCEDURE — 86769 SARS-COV-2 COVID-19 ANTIBODY: CPT

## 2021-01-20 PROCEDURE — 94660 CPAP INITIATION&MGMT: CPT

## 2021-01-20 PROCEDURE — 83735 ASSAY OF MAGNESIUM: CPT

## 2021-01-20 PROCEDURE — 85730 THROMBOPLASTIN TIME PARTIAL: CPT

## 2021-01-20 PROCEDURE — 99232 SBSQ HOSP IP/OBS MODERATE 35: CPT

## 2021-01-20 PROCEDURE — 86900 BLOOD TYPING SEROLOGIC ABO: CPT

## 2021-01-20 PROCEDURE — 85027 COMPLETE CBC AUTOMATED: CPT

## 2021-01-20 PROCEDURE — 86850 RBC ANTIBODY SCREEN: CPT

## 2021-01-20 PROCEDURE — 93306 TTE W/DOPPLER COMPLETE: CPT

## 2021-01-20 PROCEDURE — C8929: CPT

## 2021-01-20 PROCEDURE — 71045 X-RAY EXAM CHEST 1 VIEW: CPT

## 2021-01-20 PROCEDURE — 97161 PT EVAL LOW COMPLEX 20 MIN: CPT

## 2021-01-20 PROCEDURE — 94640 AIRWAY INHALATION TREATMENT: CPT

## 2021-01-20 PROCEDURE — 97116 GAIT TRAINING THERAPY: CPT

## 2021-01-20 PROCEDURE — 93005 ELECTROCARDIOGRAM TRACING: CPT

## 2021-01-20 PROCEDURE — 85610 PROTHROMBIN TIME: CPT

## 2021-01-20 PROCEDURE — 86901 BLOOD TYPING SEROLOGIC RH(D): CPT

## 2021-01-20 PROCEDURE — 82803 BLOOD GASES ANY COMBINATION: CPT

## 2021-01-20 RX ORDER — ONDANSETRON 8 MG/1
4 TABLET, FILM COATED ORAL ONCE
Refills: 0 | Status: COMPLETED | OUTPATIENT
Start: 2021-01-20 | End: 2021-01-20

## 2021-01-20 RX ORDER — METOPROLOL TARTRATE 50 MG
1 TABLET ORAL
Qty: 0 | Refills: 0 | DISCHARGE

## 2021-01-20 RX ORDER — WARFARIN SODIUM 2.5 MG/1
5 TABLET ORAL ONCE
Refills: 0 | Status: COMPLETED | OUTPATIENT
Start: 2021-01-20 | End: 2021-01-20

## 2021-01-20 RX ORDER — WARFARIN SODIUM 2.5 MG/1
1 TABLET ORAL
Qty: 30 | Refills: 0
Start: 2021-01-20 | End: 2021-02-18

## 2021-01-20 RX ORDER — BUMETANIDE 0.25 MG/ML
1 INJECTION INTRAMUSCULAR; INTRAVENOUS
Qty: 30 | Refills: 0
Start: 2021-01-20 | End: 2021-02-18

## 2021-01-20 RX ORDER — BUMETANIDE 0.25 MG/ML
2 INJECTION INTRAMUSCULAR; INTRAVENOUS DAILY
Refills: 0 | Status: DISCONTINUED | OUTPATIENT
Start: 2021-01-20 | End: 2021-01-20

## 2021-01-20 RX ORDER — POTASSIUM CHLORIDE 20 MEQ
1 PACKET (EA) ORAL
Qty: 0 | Refills: 0 | DISCHARGE

## 2021-01-20 RX ORDER — RAMIPRIL 5 MG
1 CAPSULE ORAL
Qty: 30 | Refills: 0
Start: 2021-01-20 | End: 2021-02-18

## 2021-01-20 RX ORDER — FUROSEMIDE 40 MG
2 TABLET ORAL
Qty: 0 | Refills: 0 | DISCHARGE

## 2021-01-20 RX ORDER — RAMIPRIL 5 MG
1 CAPSULE ORAL
Qty: 0 | Refills: 0 | DISCHARGE

## 2021-01-20 RX ORDER — SIMVASTATIN 20 MG/1
1 TABLET, FILM COATED ORAL
Qty: 0 | Refills: 0 | DISCHARGE

## 2021-01-20 RX ORDER — METOPROLOL TARTRATE 50 MG
1 TABLET ORAL
Qty: 30 | Refills: 0
Start: 2021-01-20 | End: 2021-02-18

## 2021-01-20 RX ORDER — BACITRACIN ZINC 500 UNIT/G
1 OINTMENT IN PACKET (EA) TOPICAL
Qty: 1 | Refills: 0
Start: 2021-01-20 | End: 2021-02-02

## 2021-01-20 RX ORDER — WARFARIN SODIUM 2.5 MG/1
1 TABLET ORAL
Qty: 0 | Refills: 0 | DISCHARGE

## 2021-01-20 RX ADMIN — BUMETANIDE 2 MILLIGRAM(S): 0.25 INJECTION INTRAMUSCULAR; INTRAVENOUS at 06:34

## 2021-01-20 RX ADMIN — Medication 25 MILLIGRAM(S): at 06:34

## 2021-01-20 RX ADMIN — Medication 2 SPRAY(S): at 11:42

## 2021-01-20 RX ADMIN — Medication 1 APPLICATION(S): at 06:34

## 2021-01-20 RX ADMIN — Medication 100 MILLIGRAM(S): at 16:08

## 2021-01-20 RX ADMIN — ONDANSETRON 4 MILLIGRAM(S): 8 TABLET, FILM COATED ORAL at 11:42

## 2021-01-20 RX ADMIN — Medication 2 SPRAY(S): at 00:12

## 2021-01-20 RX ADMIN — WARFARIN SODIUM 5 MILLIGRAM(S): 2.5 TABLET ORAL at 17:58

## 2021-01-20 RX ADMIN — Medication 1 TABLET(S): at 06:34

## 2021-01-20 RX ADMIN — BUDESONIDE AND FORMOTEROL FUMARATE DIHYDRATE 2 PUFF(S): 160; 4.5 AEROSOL RESPIRATORY (INHALATION) at 06:35

## 2021-01-20 RX ADMIN — Medication 2 SPRAY(S): at 16:08

## 2021-01-20 RX ADMIN — Medication 1 APPLICATION(S): at 16:08

## 2021-01-20 RX ADMIN — BUDESONIDE AND FORMOTEROL FUMARATE DIHYDRATE 2 PUFF(S): 160; 4.5 AEROSOL RESPIRATORY (INHALATION) at 16:08

## 2021-01-20 RX ADMIN — Medication 2 SPRAY(S): at 06:34

## 2021-01-20 NOTE — DISCHARGE NOTE NURSING/CASE MANAGEMENT/SOCIAL WORK - PATIENT PORTAL LINK FT
You can access the FollowMyHealth Patient Portal offered by [facility] by registering at the following website: [email or web address]. By joining [employer]’s FollowMyHealth portal, you will also be able to view your health information using other applications (apps) compatible with our system.

## 2021-01-20 NOTE — PROGRESS NOTE ADULT - SUBJECTIVE AND OBJECTIVE BOX
no sob    REVIEW OF SYSTEMS:  GEN: no fever,    no chills  RESP: no SOB,   no cough  CVS: no chest pain,   no palpitations  GI: no abdominal pain,   no nausea,   no vomiting,   no constipation,   no diarrhea  : no dysuria,   no frequency  NEURO: no headache,   no dizziness  PSYCH: no depression,   not anxious  Derm : no rash    MEDICATIONS  (STANDING):  allopurinol 100 milliGRAM(s) Oral daily  amoxicillin  500 milliGRAM(s)/clavulanate 1 Tablet(s) Oral two times a day  BACItracin   Ointment 1 Application(s) Topical two times a day  budesonide 160 MICROgram(s)/formoterol 4.5 MICROgram(s) Inhaler 2 Puff(s) Inhalation two times a day  buMETAnide 2 milliGRAM(s) Oral two times a day  metoprolol succinate ER 25 milliGRAM(s) Oral daily  simvastatin 10 milliGRAM(s) Oral at bedtime  sodium chloride 0.65% Nasal 2 Spray(s) Both Nostrils four times a day  warfarin 5 milliGRAM(s) Oral once    MEDICATIONS  (PRN):      Vital Signs Last 24 Hrs  T(C): 37.2 (20 Jan 2021 04:49), Max: 37.8 (19 Jan 2021 21:06)  T(F): 98.9 (20 Jan 2021 04:49), Max: 100 (19 Jan 2021 21:06)  HR: 73 (20 Jan 2021 04:49) (58 - 88)  BP: 110/60 (20 Jan 2021 04:49) (96/56 - 133/64)  BP(mean): --  RR: 18 (20 Jan 2021 04:49) (18 - 18)  SpO2: 94% (20 Jan 2021 04:49) (94% - 98%)  CAPILLARY BLOOD GLUCOSE        I&O's Summary    19 Jan 2021 07:01  -  20 Jan 2021 07:00  --------------------------------------------------------  IN: 760 mL / OUT: 500 mL / NET: 260 mL        PHYSICAL EXAM:  HEAD:  Atraumatic, Normocephalic  NECK: Supple, No   JVD  CHEST/LUNG:   no     rales,     no,    rhonchi  HEART: Regular rate and rhythm;         murmur  ABDOMEN: Soft, Nontender, ;   EXTREMITIES:    no    edema  NEUROLOGY:  alert    LABS:                        8.3    5.75  )-----------( 318      ( 19 Jan 2021 06:57 )             28.2     01-20    130<L>  |  87<L>  |  83<H>  ----------------------------<  99  4.6   |  33<H>  |  2.00<H>    Ca    10.2      20 Jan 2021 06:54  Mg     2.4     01-19      PT/INR - ( 20 Jan 2021 06:56 )   PT: 31.0 sec;   INR: 2.71 ratio                         Thyroid Stimulating Hormone, Serum: 1.630 uIU/mL (01-02 @ 06:57)          Consultant(s) Notes Reviewed:      Care Discussed with Consultants/Other Providers:

## 2021-01-20 NOTE — CHART NOTE - NSCHARTNOTESELECT_GEN_ALL_CORE
Event Note
Nutrition Services
-  Communication/Event Note
Communication/Event Note
Epistaxis/Event Note
Event Note
Heart Failure/Off Service Note
Medical Necessity  for Bipap/Event Note
chart Note Medical necessity/Event Note

## 2021-01-20 NOTE — CHART NOTE - NSCHARTNOTEFT_GEN_A_CORE
Primary team called in regards to patients rise in BUN/Cr (b/l Cr ~1.5-1.6). Her weight today is 227 lbs, which is down 4.7 lbs since 1/18. She denies any SOB, lightheadedness, or dizziness. Will lower her Bumex to 2 mg daily and educated patient on the importance of checking her weight daily upon discharge. She is scheduled for follow up in our HF clinic on 1/25 at 8:30am, which she is aware of and we will repeat labs at that time. She is aware to call if her weight increases 2 lbs in a day or 5 lbs in a week.

## 2021-01-20 NOTE — PROGRESS NOTE ADULT - ASSESSMENT
70 years      h/o   CAD,  mechanical MVR and Tricuspid valve repair,        h/o  chronic   HFpEF,   HTN  . MIGUEL ,     severe MR  ,  s/p mechanical MVR 1999 , [facility] with Dr. Anderson,  and severe TR s/p TVR 2012 ([facility]),       COPD ,on home 02; no prior tobacco use      AFib on Coumadin , failed  ablation,   ,  PHTN,   CKD 3 (b/l SCr ~1.3-1.6),  MIGUEL and gout.     h/o   hypercarbia and she reports  needing Bipap,in the past,  but hasn't used in 10 years.      Morbid  obesity,  BMI is 41     presented with  CP,  dizziness and sob     She also had epistaxis at [facility],/    1.,  Atrial fibrillation       metoprolol ER 25 mg  / lipitor     2.  MVR  mechanical,1999   was  on  IV heparin and warfarin with goal INR of 2.5-3.5 with mechanical MVR   daily INR   3.  TVR, 2012 at [facility]   4.  HTN, on Toprol   5. Dizziness , since resolved  Ct head , with infarcts, Right b. ganglia and Right cerebellum   6. .  Anemia,  has  c/c  anemia, , baseline is  8  to 9/   last colonoscopy  was  10 yrs  ago/ s/p melena last yr. and, no w/p was done.,as  pt was  deemed high risk  for  any procedure  7.  Acute  Diastolic  Chf    was on  iv bumex bid       daily weights/ follow   I/O//   still  with sob on exertion  8.  Severe Pulm Htn, on echo  Echo  12/20,normal  ef/mod  MS/  severe Pulm Htn  per card dr mcnamara , pt   transferred to  [place]   for ELIZABETH/ ? fistula  from aorta  to RV/     s/p epistaxis  seen by ent  heart  failure team following pt   echo from 1/8.  MVR/  decreased  RV function/ no pfo/vsd  pe  ENT, packing removal on 1/12  Mechanical MVR./  on coumadin  . bumex  increased  to 2 mg bid/  pt has lost 4 Kg         f/p  with pmd/ dr gomez/   per  care  cortn note,  Trilogy approved  inr  pending/  s/p  epistaxis  on 1/17 and on 1/18 , , seen by ent, gas  nasal  packing right nostril  inr  is therapeutic today/ coumadin 5 mgtoday  rise in bun/ crt noted/ from bumex   pt does best on this dose  of  bumex/  however given creatinine,  will hold  for  a day and then re start   await heart failure  f/p    d/c  when cleared by card          < from: Transthoracic Echocardiogram (01.08.21 @ 15:53) >  Conclusions:  1. Mechanical prosthetic mitral valve replacement. Peak  mitral valve gradient equals 15 mm Hg, mean transmitral  valve gradient equals 6 mm Hg, which is elevated even in  the setting of a mechanical prosthetic mitral valve  replacement (Heart rate 69 bpm).  2. Refer to prior echos for assesssment of LV function.  Septal motion consistent with cardiac surgery, right  ventricular overload.  3. Right atrial enlargement.  4. Right ventricular enlargement with decreased right  ventricular systolic function.  5. An annuloplasty ring is seen in the tricuspid position.  PG 3 mm hg, MG 1 mm hg. No tricuspid regurgitation.  6. Agitated saline injection demonstrates evidence of a  patent foramen ovale/VSD.  No color Doppler evidence of  < end of copied text >     < from: CT Head No Cont (12.29.20 @ 20:41)   IMPRESSION: No acute intracranial hemorrhage. Age-indeterminate right basal ganglia lacunar infarct and right cerebellar lacunar infarct. If there is clinical suspicion for acute infarct, consider brain MRI if there is no contraindication.  < end of copied text >    < from: TTE Echo Complete w/ Contrast w/ Doppler (12.31.20 @ 14:01) >  ummary:   1. Left ventricular ejection fraction, by visual estimation, is 60 to 65%.   2. Technically suboptimal study.   3. Normal global left ventricular systolic function.   4. Normal left ventricular internal cavity size.   5. Spectral Doppler shows pseudonormal pattern of left ventricular myocardial filling (Grade II diastolic dysfunction).   6. Normal right ventricular size and function.   7. Mild to moderately enlarged left atrium.   8. There is no evidence of pericardial effusion.   9. Mild mitral annular calcification.  10. Mild mitral valve regurgitation.  11. Mild thickening and calcification of the anterior and posterior mitral valve leaflets.  12. Peak transmitral mean gradient equals 6.8 mmHg, calculated mitral valve area by pressure half time equals 1.86 cm² consistent withmild mitral stenosis.  13. Mild tricuspid regurgitation.  14. Mild aortic regurgitation.  15. Sclerotic aortic valve with normal opening.  16. Estimated pulmonary artery systolic pressure is 68.4 mmHg assuming a right atrial pressure of 8 mmHg, whichis consistent with severe pulmonary hypertension.  17. C/w the study of 6-21-20, more significant pulmonary htn is now noted.  18. Endocardial visualization was enhanced with intravenous echo contrast.  19. Mitral valve mean gradient is 6.8 mmHg consistent with moderate mitral stenosis.  Montana Barreto MD FACC, ANGELA, FAC  < end of copied text >

## 2021-01-20 NOTE — PROGRESS NOTE ADULT - SUBJECTIVE AND OBJECTIVE BOX
PULMONARY PROGRESS NOTE    DAMARI GUERRERO  MRN-[id number]    Patient is a 70y old  Female who presents with a chief complaint of chf/ chf team (20 Jan 2021 08:31)      HPI:  comfortable  no bleeding    ROS:   -    ACTIVE MEDICATION LIST:  MEDICATIONS  (STANDING):  allopurinol 100 milliGRAM(s) Oral daily  amoxicillin  500 milliGRAM(s)/clavulanate 1 Tablet(s) Oral two times a day  BACItracin   Ointment 1 Application(s) Topical two times a day  budesonide 160 MICROgram(s)/formoterol 4.5 MICROgram(s) Inhaler 2 Puff(s) Inhalation two times a day  metoprolol succinate ER 25 milliGRAM(s) Oral daily  simvastatin 10 milliGRAM(s) Oral at bedtime  sodium chloride 0.65% Nasal 2 Spray(s) Both Nostrils four times a day  warfarin 5 milliGRAM(s) Oral once    MEDICATIONS  (PRN):      EXAM:  Vital Signs Last 24 Hrs  T(C): 37.6 (20 Jan 2021 11:35), Max: 37.8 (19 Jan 2021 21:06)  T(F): 99.6 (20 Jan 2021 11:35), Max: 100 (19 Jan 2021 21:06)  HR: 68 (20 Jan 2021 11:35) (58 - 88)  BP: 130/70 (20 Jan 2021 11:35) (96/56 - 133/64)  BP(mean): --  RR: 20 (20 Jan 2021 11:35) (18 - 20)  SpO2: 96% (20 Jan 2021 11:35) (94% - 97%)    GENERAL: The patient is awake and alert in no apparent distress.     LUNGS: Clear to auscultation without wheezing, rales or rhonchi; respirations unlabored    HEART: Regular rate and rhythm without murmur.                            8.3    5.75  )-----------( 318      ( 19 Jan 2021 06:57 )             28.2       01-20    130<L>  |  87<L>  |  83<H>  ----------------------------<  99  4.6   |  33<H>  |  2.00<H>    Ca    10.2      20 Jan 2021 06:54  Mg     2.4     01-19       rad< from: Xray Chest 1 View- PORTABLE-Urgent (Xray Chest 1 View- PORTABLE-Urgent .) (01.11.21 @ 17:43) >    EXAM:  XR CHEST PORTABLE URGENT 1V                            PROCEDURE DATE:  01/11/2021            INTERPRETATION:  TIME OF EXAM: January 11, 2021 at 5:38 PM.    CLINICAL INFORMATION: COPD. Desaturation on ambulation.    COMPARISON:  December 29,2020.    TECHNIQUE:   AP Portable chest x-ray. Limited by rotation. The chin obscures part of the upper image.    INTERPRETATION:    Heart size and the mediastinum cannot be accurately evaluated on this projection. Median sternotomy sutures, surgicalclips, and prosthetic cardiac valve again seen.  Elevated left hemidiaphragm again seen.  No definite focal lung consolidation seen.  There is continued accentuation of the central pulmonary vascular markings suggesting pulmonary vascular congestion.  No pleural effusion or pneumothorax noted.        IMPRESSION:  Elevated left hemidiaphragm again seen.    Continued accentuation of central pulmonary vascular markings suggesting pulmonary vascular congestion.                ANGELIQUE MICHELLE MD; Attending Radiologist  This document has been electronically signed. Jan 12 2021  9:52AM    < end of copied text >      PROBLEM LIST:  70y Female with HEALTH ISSUES - PROBLEM Dx:  Epistaxis  Epistaxis    Chronic kidney disease (CKD)  Chronic kidney disease (CKD)    Tricuspid valve replaced  Tricuspid valve replaced    Pulmonary hypertension  Pulmonary hypertension    COPD (chronic obstructive pulmonary disease)  COPD (chronic obstructive pulmonary disease)    Mitral valve replaced  Mitral valve replaced    Chronic atrial fibrillation  Chronic atrial fibrillation    Heart failure with preserved ejection fraction  Heart failure with preserved ejection fraction              RECS:  use bp ap whenever sleeping  f/u with us in the office for formal sleep work up  needs pfts outpatient  taper down/off 02 during day at rest       Please call with any questions.    Irma Eaton DO  [facility]/Sleep Medicine  [phone]

## 2021-01-20 NOTE — PROGRESS NOTE ADULT - PROBLEM SELECTOR PLAN 1
- Strict blood pressure control.  - Nasal saline, 2 sprays to both nares 4 times a day  - Avoid nasal trauma; no nose rubbing, blowing. Sneeze with mouth open and pinching nares.  - Avoid bending with head blow the waist.    - No heavy lifting.

## 2021-01-20 NOTE — PROGRESS NOTE ADULT - PROBLEM SELECTOR PROBLEM 1
Epistaxis
Heart failure with preserved ejection fraction
Epistaxis
Heart failure with preserved ejection fraction

## 2021-01-20 NOTE — PROGRESS NOTE ADULT - ASSESSMENT
69 yo female with significant cardiac history on coumadin with right nare epistaxis s/p anterior septum cauterized with silver nitrate and nasopore wrapped in surgicel coated in bacitracin placed in right nare; no bleeding since. Packing was removed today with no further bleeding.

## 2021-01-22 RX ORDER — SIMVASTATIN 10 MG/1
10 TABLET, FILM COATED ORAL DAILY
Refills: 0 | Status: ACTIVE | COMMUNITY
Start: 2021-01-22

## 2021-01-22 RX ORDER — BUMETANIDE 2 MG/1
2 TABLET ORAL DAILY
Refills: 0 | Status: ACTIVE | COMMUNITY
Start: 2021-01-22

## 2021-01-22 RX ORDER — BUDESONIDE AND FORMOTEROL FUMARATE DIHYDRATE 160; 4.5 UG/1; UG/1
160-4.5 AEROSOL RESPIRATORY (INHALATION)
Refills: 0 | Status: ACTIVE | COMMUNITY
Start: 2021-01-22

## 2021-01-25 ENCOUNTER — APPOINTMENT (OUTPATIENT)
Dept: HEART FAILURE | Facility: CLINIC | Age: 71
End: 2021-01-25

## 2021-03-18 NOTE — PATIENT PROFILE ADULT - FALL HARM RISK CONCLUSION
â¢ Reviewed instructions with patient:  â¢ Do NOT travel out of home area   â¢ Avoid outings from home-leave ONLY for essential needs  â¢ Immediately report any new symptoms or suspected/known exposures to COVID-19 cases   â¢ Maintain physical distancing (at least 6 feet) at all times both at home and away from home  [facility] hands frequently and thoroughly with soap and water (lather at least 20 seconds) or disinfect with alcohol-based hand  before eating, before touching face/mouth/eyes, after touching shared objects or high touch surfaces. â¢ Regularly clean cell phones, door handles, light switches, faucet and toilet handles, bathrooms, and kitchen surfaces using household disinfectant or hot soapy water. â¢ Only one visitor allowed into building. They must remain in designated  waiting area assigned by staff. â¢ Refrain from eating while in the building. â¢ You may have a drink if it is covered with a lid or cap. Please do not drink while care team members are in the room. â¢ and cannot eat or drink anything while waiting. â¢ It will be an expectation for the patient/support person to wear a mask at all times during their stay.
Fall Risk

## 2021-04-01 ENCOUNTER — INPATIENT (INPATIENT)
Facility: HOSPITAL | Age: 71
LOS: 14 days | Discharge: HOME HEALTH | End: 2021-04-16
Attending: INTERNAL MEDICINE | Admitting: INTERNAL MEDICINE
Payer: MEDICARE

## 2021-04-01 VITALS
HEART RATE: 69 BPM | SYSTOLIC BLOOD PRESSURE: 156 MMHG | HEIGHT: 64 IN | DIASTOLIC BLOOD PRESSURE: 89 MMHG | OXYGEN SATURATION: 100 % | RESPIRATION RATE: 20 BRPM | TEMPERATURE: 98 F

## 2021-04-01 DIAGNOSIS — N18.30 CHRONIC KIDNEY DISEASE, STAGE 3 UNSPECIFIED: ICD-10-CM

## 2021-04-01 DIAGNOSIS — Z95.4 PRESENCE OF OTHER HEART-VALVE REPLACEMENT: Chronic | ICD-10-CM

## 2021-04-01 DIAGNOSIS — D64.9 ANEMIA, UNSPECIFIED: ICD-10-CM

## 2021-04-01 DIAGNOSIS — I50.33 ACUTE ON CHRONIC DIASTOLIC (CONGESTIVE) HEART FAILURE: ICD-10-CM

## 2021-04-01 DIAGNOSIS — R06.02 SHORTNESS OF BREATH: ICD-10-CM

## 2021-04-01 DIAGNOSIS — Z95.2 PRESENCE OF PROSTHETIC HEART VALVE: ICD-10-CM

## 2021-04-01 DIAGNOSIS — I10 ESSENTIAL (PRIMARY) HYPERTENSION: ICD-10-CM

## 2021-04-01 DIAGNOSIS — R00.1 BRADYCARDIA, UNSPECIFIED: ICD-10-CM

## 2021-04-01 DIAGNOSIS — J44.9 CHRONIC OBSTRUCTIVE PULMONARY DISEASE, UNSPECIFIED: ICD-10-CM

## 2021-04-01 DIAGNOSIS — Z29.9 ENCOUNTER FOR PROPHYLACTIC MEASURES, UNSPECIFIED: ICD-10-CM

## 2021-04-01 LAB
ALBUMIN SERPL ELPH-MCNC: 3.8 G/DL — SIGNIFICANT CHANGE UP (ref 3.3–5)
ALP SERPL-CCNC: 103 U/L — SIGNIFICANT CHANGE UP (ref 40–120)
ALT FLD-CCNC: 15 U/L — SIGNIFICANT CHANGE UP (ref 4–33)
ANION GAP SERPL CALC-SCNC: 8 MMOL/L — SIGNIFICANT CHANGE UP (ref 7–14)
APTT BLD: 137.6 SEC — SIGNIFICANT CHANGE UP (ref 27–36.3)
APTT BLD: 40.6 SEC — HIGH (ref 27–36.3)
APTT BLD: 41.7 SEC — HIGH (ref 27–36.3)
AST SERPL-CCNC: 23 U/L — SIGNIFICANT CHANGE UP (ref 4–32)
BASOPHILS # BLD AUTO: 0.02 K/UL — SIGNIFICANT CHANGE UP (ref 0–0.2)
BASOPHILS NFR BLD AUTO: 0.4 % — SIGNIFICANT CHANGE UP (ref 0–2)
BILIRUB SERPL-MCNC: 0.3 MG/DL — SIGNIFICANT CHANGE UP (ref 0.2–1.2)
BLD GP AB SCN SERPL QL: NEGATIVE — SIGNIFICANT CHANGE UP
BLOOD GAS VENOUS COMPREHENSIVE RESULT: SIGNIFICANT CHANGE UP
BUN SERPL-MCNC: 81 MG/DL — HIGH (ref 7–23)
CALCIUM SERPL-MCNC: 10.7 MG/DL — HIGH (ref 8.4–10.5)
CHLORIDE SERPL-SCNC: 94 MMOL/L — LOW (ref 98–107)
CO2 SERPL-SCNC: 36 MMOL/L — HIGH (ref 22–31)
CREAT SERPL-MCNC: 1.63 MG/DL — HIGH (ref 0.5–1.3)
EOSINOPHIL # BLD AUTO: 0.15 K/UL — SIGNIFICANT CHANGE UP (ref 0–0.5)
EOSINOPHIL NFR BLD AUTO: 2.7 % — SIGNIFICANT CHANGE UP (ref 0–6)
GLUCOSE SERPL-MCNC: 118 MG/DL — HIGH (ref 70–99)
HCT VFR BLD CALC: 27.3 % — LOW (ref 34.5–45)
HCT VFR BLD CALC: 29.3 % — LOW (ref 34.5–45)
HCT VFR BLD CALC: 30.5 % — LOW (ref 34.5–45)
HGB BLD-MCNC: 7.8 G/DL — LOW (ref 11.5–15.5)
HGB BLD-MCNC: 8.1 G/DL — LOW (ref 11.5–15.5)
HGB BLD-MCNC: 8.5 G/DL — LOW (ref 11.5–15.5)
IANC: 3.58 K/UL — SIGNIFICANT CHANGE UP (ref 1.5–8.5)
IMM GRANULOCYTES NFR BLD AUTO: 0.5 % — SIGNIFICANT CHANGE UP (ref 0–1.5)
INR BLD: 1.31 RATIO — HIGH (ref 0.88–1.16)
LYMPHOCYTES # BLD AUTO: 1.29 K/UL — SIGNIFICANT CHANGE UP (ref 1–3.3)
LYMPHOCYTES # BLD AUTO: 23 % — SIGNIFICANT CHANGE UP (ref 13–44)
MAGNESIUM SERPL-MCNC: 2.5 MG/DL — SIGNIFICANT CHANGE UP (ref 1.6–2.6)
MCHC RBC-ENTMCNC: 26.1 PG — LOW (ref 27–34)
MCHC RBC-ENTMCNC: 26.2 PG — LOW (ref 27–34)
MCHC RBC-ENTMCNC: 26.5 PG — LOW (ref 27–34)
MCHC RBC-ENTMCNC: 27.6 GM/DL — LOW (ref 32–36)
MCHC RBC-ENTMCNC: 27.9 GM/DL — LOW (ref 32–36)
MCHC RBC-ENTMCNC: 28.6 GM/DL — LOW (ref 32–36)
MCV RBC AUTO: 92.9 FL — SIGNIFICANT CHANGE UP (ref 80–100)
MCV RBC AUTO: 93.8 FL — SIGNIFICANT CHANGE UP (ref 80–100)
MCV RBC AUTO: 94.5 FL — SIGNIFICANT CHANGE UP (ref 80–100)
MONOCYTES # BLD AUTO: 0.53 K/UL — SIGNIFICANT CHANGE UP (ref 0–0.9)
MONOCYTES NFR BLD AUTO: 9.5 % — SIGNIFICANT CHANGE UP (ref 2–14)
NEUTROPHILS # BLD AUTO: 3.58 K/UL — SIGNIFICANT CHANGE UP (ref 1.8–7.4)
NEUTROPHILS NFR BLD AUTO: 63.9 % — SIGNIFICANT CHANGE UP (ref 43–77)
NRBC # BLD: 0 /100 WBCS — SIGNIFICANT CHANGE UP
NRBC # FLD: 0 K/UL — SIGNIFICANT CHANGE UP
NT-PROBNP SERPL-SCNC: 1951 PG/ML — HIGH
PHOSPHATE SERPL-MCNC: 3.3 MG/DL — SIGNIFICANT CHANGE UP (ref 2.5–4.5)
PLATELET # BLD AUTO: 258 K/UL — SIGNIFICANT CHANGE UP (ref 150–400)
PLATELET # BLD AUTO: 271 K/UL — SIGNIFICANT CHANGE UP (ref 150–400)
PLATELET # BLD AUTO: 280 K/UL — SIGNIFICANT CHANGE UP (ref 150–400)
POTASSIUM SERPL-MCNC: 5.1 MMOL/L — SIGNIFICANT CHANGE UP (ref 3.5–5.3)
POTASSIUM SERPL-SCNC: 5.1 MMOL/L — SIGNIFICANT CHANGE UP (ref 3.5–5.3)
PROT SERPL-MCNC: 8.4 G/DL — HIGH (ref 6–8.3)
PROTHROM AB SERPL-ACNC: 14.7 SEC — HIGH (ref 10.6–13.6)
RBC # BLD: 2.94 M/UL — LOW (ref 3.8–5.2)
RBC # BLD: 3.1 M/UL — LOW (ref 3.8–5.2)
RBC # BLD: 3.25 M/UL — LOW (ref 3.8–5.2)
RBC # FLD: 15.8 % — HIGH (ref 10.3–14.5)
RBC # FLD: 16 % — HIGH (ref 10.3–14.5)
RBC # FLD: 16 % — HIGH (ref 10.3–14.5)
RH IG SCN BLD-IMP: POSITIVE — SIGNIFICANT CHANGE UP
SODIUM SERPL-SCNC: 138 MMOL/L — SIGNIFICANT CHANGE UP (ref 135–145)
TROPONIN T, HIGH SENSITIVITY RESULT: 26 NG/L — SIGNIFICANT CHANGE UP
TROPONIN T, HIGH SENSITIVITY RESULT: 28 NG/L — SIGNIFICANT CHANGE UP
WBC # BLD: 5.6 K/UL — SIGNIFICANT CHANGE UP (ref 3.8–10.5)
WBC # BLD: 6.71 K/UL — SIGNIFICANT CHANGE UP (ref 3.8–10.5)
WBC # BLD: 7.05 K/UL — SIGNIFICANT CHANGE UP (ref 3.8–10.5)
WBC # FLD AUTO: 5.6 K/UL — SIGNIFICANT CHANGE UP (ref 3.8–10.5)
WBC # FLD AUTO: 6.71 K/UL — SIGNIFICANT CHANGE UP (ref 3.8–10.5)
WBC # FLD AUTO: 7.05 K/UL — SIGNIFICANT CHANGE UP (ref 3.8–10.5)

## 2021-04-01 PROCEDURE — 99285 EMERGENCY DEPT VISIT HI MDM: CPT | Mod: 25

## 2021-04-01 PROCEDURE — 99233 SBSQ HOSP IP/OBS HIGH 50: CPT

## 2021-04-01 PROCEDURE — 93010 ELECTROCARDIOGRAM REPORT: CPT

## 2021-04-01 PROCEDURE — 71045 X-RAY EXAM CHEST 1 VIEW: CPT | Mod: 26

## 2021-04-01 PROCEDURE — 99223 1ST HOSP IP/OBS HIGH 75: CPT

## 2021-04-01 RX ORDER — LISINOPRIL 2.5 MG/1
10 TABLET ORAL DAILY
Refills: 0 | Status: DISCONTINUED | OUTPATIENT
Start: 2021-04-01 | End: 2021-04-04

## 2021-04-01 RX ORDER — ALLOPURINOL 300 MG
100 TABLET ORAL DAILY
Refills: 0 | Status: DISCONTINUED | OUTPATIENT
Start: 2021-04-01 | End: 2021-04-16

## 2021-04-01 RX ORDER — IPRATROPIUM/ALBUTEROL SULFATE 18-103MCG
3 AEROSOL WITH ADAPTER (GRAM) INHALATION EVERY 6 HOURS
Refills: 0 | Status: DISCONTINUED | OUTPATIENT
Start: 2021-04-01 | End: 2021-04-13

## 2021-04-01 RX ORDER — FUROSEMIDE 40 MG
40 TABLET ORAL
Refills: 0 | Status: COMPLETED | OUTPATIENT
Start: 2021-04-01 | End: 2021-04-05

## 2021-04-01 RX ORDER — OXYMETAZOLINE HYDROCHLORIDE 0.5 MG/ML
2 SPRAY NASAL ONCE
Refills: 0 | Status: COMPLETED | OUTPATIENT
Start: 2021-04-01 | End: 2021-04-01

## 2021-04-01 RX ORDER — SUCRALFATE 1 G
1 TABLET ORAL ONCE
Refills: 0 | Status: COMPLETED | OUTPATIENT
Start: 2021-04-01 | End: 2021-04-01

## 2021-04-01 RX ORDER — FAMOTIDINE 10 MG/ML
20 INJECTION INTRAVENOUS ONCE
Refills: 0 | Status: COMPLETED | OUTPATIENT
Start: 2021-04-01 | End: 2021-04-01

## 2021-04-01 RX ORDER — BUDESONIDE AND FORMOTEROL FUMARATE DIHYDRATE 160; 4.5 UG/1; UG/1
2 AEROSOL RESPIRATORY (INHALATION)
Refills: 0 | Status: DISCONTINUED | OUTPATIENT
Start: 2021-04-01 | End: 2021-04-16

## 2021-04-01 RX ORDER — HEPARIN SODIUM 5000 [USP'U]/ML
8500 INJECTION INTRAVENOUS; SUBCUTANEOUS EVERY 6 HOURS
Refills: 0 | Status: DISCONTINUED | OUTPATIENT
Start: 2021-04-01 | End: 2021-04-09

## 2021-04-01 RX ORDER — SODIUM CHLORIDE 0.65 %
2 AEROSOL, SPRAY (ML) NASAL
Refills: 0 | Status: DISCONTINUED | OUTPATIENT
Start: 2021-04-01 | End: 2021-04-16

## 2021-04-01 RX ORDER — OXYMETAZOLINE HYDROCHLORIDE 0.5 MG/ML
2 SPRAY NASAL EVERY 12 HOURS
Refills: 0 | Status: COMPLETED | OUTPATIENT
Start: 2021-04-01 | End: 2021-04-04

## 2021-04-01 RX ORDER — FUROSEMIDE 40 MG
40 TABLET ORAL ONCE
Refills: 0 | Status: COMPLETED | OUTPATIENT
Start: 2021-04-01 | End: 2021-04-01

## 2021-04-01 RX ORDER — HYDRALAZINE HCL 50 MG
5 TABLET ORAL ONCE
Refills: 0 | Status: COMPLETED | OUTPATIENT
Start: 2021-04-01 | End: 2021-04-01

## 2021-04-01 RX ORDER — WARFARIN SODIUM 2.5 MG/1
3 TABLET ORAL ONCE
Refills: 0 | Status: COMPLETED | OUTPATIENT
Start: 2021-04-01 | End: 2021-04-01

## 2021-04-01 RX ORDER — SIMVASTATIN 20 MG/1
10 TABLET, FILM COATED ORAL AT BEDTIME
Refills: 0 | Status: DISCONTINUED | OUTPATIENT
Start: 2021-04-01 | End: 2021-04-16

## 2021-04-01 RX ORDER — PANTOPRAZOLE SODIUM 20 MG/1
40 TABLET, DELAYED RELEASE ORAL
Refills: 0 | Status: DISCONTINUED | OUTPATIENT
Start: 2021-04-01 | End: 2021-04-16

## 2021-04-01 RX ORDER — HEPARIN SODIUM 5000 [USP'U]/ML
1400 INJECTION INTRAVENOUS; SUBCUTANEOUS
Qty: 25000 | Refills: 0 | Status: DISCONTINUED | OUTPATIENT
Start: 2021-04-01 | End: 2021-04-02

## 2021-04-01 RX ORDER — HEPARIN SODIUM 5000 [USP'U]/ML
4000 INJECTION INTRAVENOUS; SUBCUTANEOUS EVERY 6 HOURS
Refills: 0 | Status: DISCONTINUED | OUTPATIENT
Start: 2021-04-01 | End: 2021-04-09

## 2021-04-01 RX ADMIN — Medication 30 MILLILITER(S): at 02:20

## 2021-04-01 RX ADMIN — HEPARIN SODIUM 0 UNIT(S)/HR: 5000 INJECTION INTRAVENOUS; SUBCUTANEOUS at 18:14

## 2021-04-01 RX ADMIN — OXYMETAZOLINE HYDROCHLORIDE 2 SPRAY(S): 0.5 SPRAY NASAL at 22:17

## 2021-04-01 RX ADMIN — Medication 5 MILLIGRAM(S): at 17:16

## 2021-04-01 RX ADMIN — Medication 40 MILLIGRAM(S): at 05:07

## 2021-04-01 RX ADMIN — Medication 40 MILLIGRAM(S): at 11:45

## 2021-04-01 RX ADMIN — Medication 40 MILLIGRAM(S): at 18:03

## 2021-04-01 RX ADMIN — Medication 1 GRAM(S): at 02:19

## 2021-04-01 RX ADMIN — HEPARIN SODIUM 1400 UNIT(S)/HR: 5000 INJECTION INTRAVENOUS; SUBCUTANEOUS at 13:56

## 2021-04-01 RX ADMIN — WARFARIN SODIUM 3 MILLIGRAM(S): 2.5 TABLET ORAL at 18:03

## 2021-04-01 RX ADMIN — SIMVASTATIN 10 MILLIGRAM(S): 20 TABLET, FILM COATED ORAL at 22:22

## 2021-04-01 RX ADMIN — Medication 100 MILLIGRAM(S): at 12:10

## 2021-04-01 RX ADMIN — Medication 3 MILLILITER(S): at 22:39

## 2021-04-01 RX ADMIN — Medication 30 MILLILITER(S): at 20:01

## 2021-04-01 RX ADMIN — HEPARIN SODIUM 1100 UNIT(S)/HR: 5000 INJECTION INTRAVENOUS; SUBCUTANEOUS at 20:01

## 2021-04-01 RX ADMIN — FAMOTIDINE 20 MILLIGRAM(S): 10 INJECTION INTRAVENOUS at 02:19

## 2021-04-01 NOTE — H&P ADULT - PROBLEM SELECTOR PLAN 2
-currently in Afib with RVR with slow ventricular response  -will hold metoprolol and digoxin  -cardiology consult -currently in Afib with slow ventricular response  -will hold metoprolol and digoxin  -EP consult

## 2021-04-01 NOTE — CONSULT NOTE ADULT - ATTENDING COMMENTS
69 yo female with a PMHx of CAD, HFpEF (EF 65-60%), severe MR s/p mechanical MVR 1999 (Dr. Anderson, [facility]), severe TR s/p mechanical TVR (2012 [facility]), afib s/p ablation, HTN, HLD, CKD (bl 1.35-1.5), COPD on 3L NC at home, MIGUEL who presented to the for sob, dizziness x 1 week, found to be in decompensated HFpEF, w/ Afib. TTE in 1/2021 c/f possible L-R shunt, ?fistula between aortic valve and RV. EP consulted for Afib with episodes of bradycardia. Will follow on tele.

## 2021-04-01 NOTE — H&P ADULT - ASSESSMENT
70 y.o. F with PMH of morbid obesity, CAD,  HFpEF (EF 65-60%), severe MR and TR s/p mechanical MV and TV repair (on coumadin), Afib s/p failed ablation, HTN, HLD, HTN, MIGUEL, COPD (on 3L O2), CKD3, gout, anemia (on iron pills) who presents with worsening dyspnea likely in the setting of acute on chronic heart failure exacerbation.

## 2021-04-01 NOTE — H&P ADULT - NSHPREVIEWOFSYSTEMS_GEN_ALL_CORE
REVIEW OF SYSTEMS:    CONSTITUTIONAL: No weakness, fevers or chills  EYES/ENT: No visual changes;  No vertigo or throat pain   NECK: No pain or stiffness  RESPIRATORY: No cough, wheezing, hemoptysis; + shortness of breath  CARDIOVASCULAR: No chest pain or palpitations  GASTROINTESTINAL: No abdominal or epigastric pain. No nausea, vomiting, or hematemesis; No diarrhea or constipation. + occasional dark stools  GENITOURINARY: No dysuria, frequency or hematuria  NEUROLOGICAL: No numbness or weakness  PSYCH: No A/V hallucinations  MSK: No joint pain  SKIN: No itching, rashes

## 2021-04-01 NOTE — H&P ADULT - PROBLEM SELECTOR PLAN 7
-baseline approximately 1.5-1.6, currently at baseline  -avoid  nephrotoxins and renally dose medications

## 2021-04-01 NOTE — ED PROVIDER NOTE - PROGRESS NOTE DETAILS
Leo, PGY2: spoke with Trent GARCÍA, instructed to admit to [facility]ad GARCÍA Leo, PGY2: labs reviewed, consistent with CHF exacerbation. h/h at baseline. spoke with hospitalist who instructed to call Trent

## 2021-04-01 NOTE — H&P ADULT - PROBLEM SELECTOR PLAN 1
-VILLA, orthopnea, and pulmonary vascular congestion -- symptoms are clinically consistent with ADHF exacerbation, less likely COPD exacerbation given no increase in cough, sputum production, or O2 requirement  -will diurese with IV Lasix 40 mg daily  -strict I's and O's  -daily weight  -repeat TTE -VILLA, orthopnea, and pulmonary vascular congestion -- symptoms are clinically consistent with ADHF exacerbation, less likely COPD exacerbation given no increase in cough, sputum production, or O2 requirement  -will diurese with IV Lasix 40 mg BID  -strict I's and O's  -daily weight  -repeat TTE

## 2021-04-01 NOTE — CONSULT NOTE ADULT - ASSESSMENT
69 yo female with a PMHx of CAD, HFpEF (EF 65-60%), severe MR s/p mechanical MVR 1999 (Dr. Anderson, [facility]), severe TR s/p mechanical TVR (2012 [facility]), afib s/p ablation, HTN, HLD, CKD (bl 1.35-1.5), COPD on 3L NC at home, MIGUEL who presented to the for sob, dizziness x 1 week, found to be in decompensated HFpEF, w/ Afib. TTE in 1/2021 c/f possible L-R shunt, ?fistula between aortic valve and RV. EP consulted for Afib with episodes of bradycardia    -hold metoprolol given bradycardic episodes  -DC digoxin, patient should not be on digoxin as per her last admission notes where it was discontinued.   -keep pacer pads at bedside  -monitor on tele  -remains asymptomatic   INR for mechanical MVR, start heparin gtt and bridge with coumadin with INR goal 2.5-3.5.    TTE in 1/2021   Conclusions:  1. Mechanical prosthetic mitral valve replacement. Peak  mitral valve gradient equals 15 mm Hg, mean transmitral  valve gradient equals 6 mm Hg, which is elevated even in  the setting of a mechanical prosthetic mitral valve  replacement (Heart rate 69 bpm).  2. Refer to prior echos for assesssment of LV function.  Septal motion consistent with cardiac surgery, right  ventricular overload.  3. Right atrial enlargement.  4. Right ventricular enlargement with decreased right  ventricular systolic function.  5. An annuloplasty ring is seen in the tricuspid position.  PG 3 mm hg, MG 1 mm hg. No tricuspid regurgitation.  6. Agitated saline injection demonstrates evidence of a  patent foramen ovale/VSD.  No color Doppler evidence of  VSD. 71 yo female with a PMHx of CAD, HFpEF (EF 65-60%), severe MR s/p mechanical MVR 1999 (Dr. Anderson, [facility]), severe TR s/p mechanical TVR (2012 [facility]), afib s/p ablation, HTN, HLD, CKD (bl 1.35-1.5), COPD on 3L NC at home, MIGUEL who presented to the for sob, dizziness x 1 week, found to be in decompensated HFpEF, w/ Afib. TTE in 1/2021 c/f possible L-R shunt, ?fistula between aortic valve and RV. EP consulted for Afib with episodes of bradycardia.     -hold metoprolol given bradycardic episodes  -DC digoxin, patient should not be on digoxin as per her last admission notes where it was discontinued.   -monitor on tele  -remains asymptomatic   INR for mechanical MVR, start heparin gtt and bridge with coumadin with INR goal 2.5-3.5.  - No further EP work up at this time. Please reconsult if needed    TTE in 1/2021   Conclusions:  1. Mechanical prosthetic mitral valve replacement. Peak  mitral valve gradient equals 15 mm Hg, mean transmitral  valve gradient equals 6 mm Hg, which is elevated even in  the setting of a mechanical prosthetic mitral valve  replacement (Heart rate 69 bpm).  2. Refer to prior echos for assesssment of LV function.  Septal motion consistent with cardiac surgery, right  ventricular overload.  3. Right atrial enlargement.  4. Right ventricular enlargement with decreased right  ventricular systolic function.  5. An annuloplasty ring is seen in the tricuspid position.  PG 3 mm hg, MG 1 mm hg. No tricuspid regurgitation.  6. Agitated saline injection demonstrates evidence of a  patent foramen ovale/VSD.  No color Doppler evidence of  VSD.

## 2021-04-01 NOTE — H&P ADULT - HISTORY OF PRESENT ILLNESS
70 y.o. F with PMH of morbid obesity, CAD,  HFpEF (EF 65-60%), severe MR and TR s/p mechanical MV and TV repair (on coumadin), Afib s/p failed ablation, HTN, HLD, HTN, MIGUEL, COPD (on 3L O2), CKD3, gout, anemia (on iron pills) who presents with worsening dyspnea since 4 days prior to admission. Patient reports dyspnea worsening with exertion and orthopnea. No increased cough, no increased sputum production, and patient has remained on her baseline of 3L NC. She also has been reporting intermittent dark stools, which she has attributed to taking PO iron. Patient feels she may have been consuming more liquids than her fluid restriction allows and sometimes took additional Lasix PRN at home. She also endorses epigastric pain that has since resolved. ROS negative for fevers, chills, N/V/D, dysuria, or hematuria.     She currently states she is breathing more comfortably after receiving IV Lasix in ED.

## 2021-04-01 NOTE — ED ADULT NURSE REASSESSMENT NOTE - NS ED NURSE REASSESS COMMENT FT1
Report received from [facility] coverage RN. Pt. received A&Ox4, with 20G IV in left ac, on 3L O2 via nasal cannula and on cardiac monitor. Pt. offers no complaints at present. Bradycardic at present. MD Bailey aware. EKG in chart. Will continue to monitor.

## 2021-04-01 NOTE — ED ADULT NURSE REASSESSMENT NOTE - NS ED NURSE REASSESS COMMENT FT1
Pt. awake & alert. In no acute distress. Respirations even & unlabored. Pt. admitted to Tele, report given to [facility] FREDDY Benjamin. Pt. transported to [facility].

## 2021-04-01 NOTE — H&P ADULT - NSHPLABSRESULTS_GEN_ALL_CORE
.                        7.8    5.60  )-----------( 258      ( 01 Apr 2021 02:35 )             27.3     Hgb Trend: 7.8<--  04-01    138  |  94<L>  |  81<H>  ----------------------------<  118<H>  5.1   |  36<H>  |  1.63<H>    Ca    10.7<H>      01 Apr 2021 02:35  Phos  3.3     04-01  Mg     2.5     04-01    TPro  8.4<H>  /  Alb  3.8  /  TBili  0.3  /  DBili  x   /  AST  23  /  ALT  15  /  AlkPhos  103  04-01    Creatinine Trend: 1.63<--  PT/INR - ( 01 Apr 2021 02:35 )   PT: 14.7 sec;   INR: 1.31 ratio         PTT - ( 01 Apr 2021 02:35 )  PTT:41.7 sec      CXR with evidence of pulmonary vascular congestion, my read    EKG: Sinus bradycardia, my read

## 2021-04-01 NOTE — CONSULT NOTE ADULT - SUBJECTIVE AND OBJECTIVE BOX
Patient seen and evaluated at bedside    Chief Complaint:    HPI:  70 y.o. F with PMH of morbid obesity, CAD,  HFpEF (EF 65-60%), severe MR and TR s/p mechanical MV in 1999 and TV repair in 2012 (on coumadin), Afib s/p failed ablation, HTN, HLD, HTN, MIGUEL, COPD (on 3L O2), CKD3, gout, anemia (on iron pills) who presents with worsening dyspnea since 4 days prior to admission. Patient reports dyspnea worsening with exertion and orthopnea. No increased cough, no increased sputum production, and patient has remained on her baseline of 3L NC. She also has been reporting intermittent dark stools, which she has attributed to taking PO iron. Patient feels she may have been consuming more liquids than her fluid restriction allows and sometimes took additional Lasix PRN at home. She also endorses epigastric pain that has since resolved. ROS negative for fevers, chills, N/V/D, dysuria, or hematuria.     Was recently admitted in Jan for chest pain and HFpEF, found to have Afib with SVR and at that time metoprolol was decreased to 25 and digoxin was discontinued. HF was managed with IV diuresis.    PMHx:   Atrial fibrillation    Pulmonary hypertension    MIGUEL (obstructive sleep apnea)    COPD (chronic obstructive pulmonary disease)    CHF (congestive heart failure)    HTN (hypertension)    Gout    Mitral valve replaced    Tricuspid valve replaced    CHF (congestive heart failure)    Hypertension    COPD (chronic obstructive pulmonary disease)        PSHx:   No significant past surgical history    History of mitral valve replacement with mechanical valve    Tricuspid valve replaced    No significant past surgical history        Allergies:  No Known Allergies      Home Meds:    Current Medications:   albuterol/ipratropium for Nebulization 3 milliLiter(s) Nebulizer every 6 hours  allopurinol 100 milliGRAM(s) Oral daily  aluminum hydroxide/magnesium hydroxide/simethicone Suspension 30 milliLiter(s) Oral every 6 hours PRN  budesonide 160 MICROgram(s)/formoterol 4.5 MICROgram(s) Inhaler 2 Puff(s) Inhalation two times a day  furosemide   Injectable 40 milliGRAM(s) IV Push two times a day  heparin   Injectable 8500 Unit(s) IV Push every 6 hours PRN  heparin   Injectable 4000 Unit(s) IV Push every 6 hours PRN  heparin  Infusion. 1400 Unit(s)/Hr IV Continuous <Continuous>  pantoprazole    Tablet 40 milliGRAM(s) Oral before breakfast  simvastatin 10 milliGRAM(s) Oral at bedtime  sodium chloride 0.65% Nasal 2 Spray(s) Both Nostrils four times a day PRN  warfarin 3 milliGRAM(s) Oral once      FAMILY HISTORY:  Family history of hypertension (Father, Sibling)    Family history of stroke    Family history of hypertension (Mother)        Social History:  Smoking History:  Alcohol Use:  Drug Use:    REVIEW OF SYSTEMS:  Constitutional:     [ ] negative [ ] fevers [ ] chills [ ] weight loss [ ] weight gain  HEENT:                  [ ] negative [ ] dry eyes [ ] eye irritation [ ] postnasal drip [ ] nasal congestion  CV:                         [ ] negative  [ ] chest pain [ ] orthopnea [ ] palpitations [ ] murmur  Resp:                     [ ] negative [ ] cough [ ] shortness of breath [ ] dyspnea [ ] wheezing [ ] sputum [ ]hemoptysis  GI:                          [ ] negative [ ] nausea [ ] vomiting [ ] diarrhea [ ] constipation [ ] abd pain [ ] dysphagia   :                        [ ] negative [ ] dysuria [ ] nocturia [ ] hematuria [ ] increased urinary frequency  Musculoskeletal: [ ] negative [ ] back pain [ ] myalgias [ ] arthralgias [ ] fracture  Skin:                       [ ] negative [ ] rash [ ] itch  Neurological:        [ ] negative [ ] headache [ ] dizziness [ ] syncope [ ] weakness [ ] numbness  Psychiatric:           [ ] negative [ ] anxiety [ ] depression  Endocrine:            [ ] negative [ ] diabetes [ ] thyroid problem  Heme/Lymph:      [ ] negative [ ] anemia [ ] bleeding problem  Allergic/Immune: [ ] negative [ ] itchy eyes [ ] nasal discharge [ ] hives [ ] angioedema    [ ] All other systems negative  [ ] Unable to assess ROS due to      Physical Exam:  T(F): 97.7 (04-01), Max: 98.7 (04-01)  HR: 62 (04-01) (48 - 69)  BP: 127/63 (04-01) (120/57 - 165/56)  RR: 21 (04-01)  SpO2: 100% (04-01)  GENERAL: No acute distress, well-developed  HEAD:  Atraumatic, Normocephalic  ENT: EOMI, PERRLA, conjunctiva and sclera clear, Neck supple, No JVD, moist mucosa  CHEST/LUNG: Clear to auscultation bilaterally; No wheeze, equal breath sounds bilaterally   BACK: No spinal tenderness  HEART: Regular rate and rhythm; No murmurs, rubs, or gallops  ABDOMEN: Soft, Nontender, Nondistended; Bowel sounds present  EXTREMITIES:  No clubbing, cyanosis, or edema  PSYCH: Nl behavior, nl affect  NEUROLOGY: AAOx3, non-focal, cranial nerves intact  SKIN: Normal color, No rashes or lesions  LINES:    Cardiovascular Diagnostic Testing:    ECG: Personally reviewed:    Echo: Personally reviewed:    Stress Testing:    Cath:    Imaging:    CXR: Personally reviewed    Labs: Personally reviewed                        7.8    5.60  )-----------( 258      ( 01 Apr 2021 02:35 )             27.3     04-01    138  |  94<L>  |  81<H>  ----------------------------<  118<H>  5.1   |  36<H>  |  1.63<H>    Ca    10.7<H>      01 Apr 2021 02:35  Phos  3.3     04-01  Mg     2.5     04-01    TPro  8.4<H>  /  Alb  3.8  /  TBili  0.3  /  DBili  x   /  AST  23  /  ALT  15  /  AlkPhos  103  04-01    PT/INR - ( 01 Apr 2021 02:35 )   PT: 14.7 sec;   INR: 1.31 ratio         PTT - ( 01 Apr 2021 02:35 )  PTT:41.7 sec  Serum Pro-Brain Natriuretic Peptide: 1951 pg/mL (04-01 @ 02:35)

## 2021-04-01 NOTE — ED PROVIDER NOTE - ATTENDING CONTRIBUTION TO CARE
HPI: 71y/o F w/ h/o morbid obesity, CAD, HFpEF (EF 65-60%), severe MR and TR s/p mechanical MV and TV repair (on coumadin), Afib s/p failed ablation, HTN, HLD, HTN, MIGUEL, COPD (on 3L O2), CKD3, gout, anemia (on iron pills) p/w SOB for 3-4d feels similar to previous SOB when pt had low blood count, worse when exerting herself and laying flat. Endorses epigastric pain which feels like "hole in stomach" as well as dark stools (last colonoscopy 10 years ago), fatigue and lightheadedness. No fevers, cough, urinary symptoms.  EXAM: NAD, speaking full sentences, lungs with crackles without wheezing, heart with systolic murmur, abd soft nontender, no pitting edema. Conjunctiva pale.   MDM: pt with multiple medical problems including CHF and COPD that presents with SOB. likely COPD exacerbation vs CHF although does not appear fluid overloaded. pt is taking her meds which includes a BB, and HR noted to be ofelia. pt also has epigastric pain and told that she had trasfusion in past. possible anemia from GIB. On blood thinners. Will require labs and check for anemia and possible transfusion. Will likely require admission.

## 2021-04-01 NOTE — H&P ADULT - PROBLEM SELECTOR PLAN 3
-pt with normocytic anemia, currently 7.8, nearly at baseline of 8  -rectal exam without evidence of melena or BRBPR; suspect possibly in setting of PO iron use  -monitor CBC q12h   -f/u anemia w/u

## 2021-04-01 NOTE — ED ADULT NURSE REASSESSMENT NOTE - NS ED NURSE REASSESS COMMENT FT1
Pt became sinus ofelia to the 40s, pt is AOX4, offers no complaints. EKG in progress. AYALA GARCÍA aware

## 2021-04-01 NOTE — H&P ADULT - NSHPPHYSICALEXAM_GEN_ALL_CORE
Vital Signs Last 24 Hrs  T(C): 36.5 (01 Apr 2021 07:18), Max: 37.1 (01 Apr 2021 01:52)  T(F): 97.7 (01 Apr 2021 07:18), Max: 98.7 (01 Apr 2021 01:52)  HR: 62 (01 Apr 2021 11:45) (48 - 69)  BP: 127/63 (01 Apr 2021 11:45) (120/57 - 165/56)  BP(mean): --  RR: 21 (01 Apr 2021 09:40) (18 - 21)  SpO2: 100% (01 Apr 2021 09:40) (99% - 100%)    General: Obese female in NAD  Neurology: A&Ox3, nonfocal, MARMOLEJO x 4  Eyes: PERRLA/ EOMI, Gross vision intact  ENT/Neck: Neck supple, trachea midline, No JVD, Gross hearing intact  Respiratory: Mild tachypnea. CTA B/L, No wheezing, rales, rhonchi  CV: RRR, S1S2, systolic murmur 2+. Trace LE edema.   Abdominal: Obese. Soft, NT, ND +BS. Rectal exam with brown stool, no melena or hematochezia  Extremities:  + peripheral pulses  Skin: No Rashes, Hematoma, Ecchymosis

## 2021-04-01 NOTE — ED PROVIDER NOTE - OBJECTIVE STATEMENT
71y/o F w/ h/o morbid obesity, CAD, HFpEF (EF 65-60%), severe MR and TR s/p mechanical MV and TV repair (on coumadin), Afib s/p failed ablation, HTN, HLD, HTN, MIGUEL, COPD (on 3L O2), CKD3, gout p/w SOB 69y/o F w/ h/o morbid obesity, CAD, HFpEF (EF 65-60%), severe MR and TR s/p mechanical MV and TV repair (on coumadin), Afib s/p failed ablation, HTN, HLD, HTN, MIGUEL, COPD (on 3L O2), CKD3, gout, anemia (on iron pills) p/w SOB for 3-4d feels similar to previous SOB when pt had low blood count, worse when exerting herself and laying flat. Endorses epigastric pain which feels like "hole in stomach" as well as dark stools (last colonoscopy 10 years ago), fatigue and lightheadedness. No fevers, cough, urinary symptoms.

## 2021-04-01 NOTE — ED ADULT NURSE NOTE - OBJECTIVE STATEMENT
Pt presents to room 17, alert&orientedx4, ambulatory with cane at baseline, PMHX COPD (3L O2 baseline at home), HTN, Gout and A.Fib coming to ED for SOB and non-radiating mid-sternal chest pain x3-4days. Pt arrived on 3L of O2 NC, breathing non-labored, speaking in complete sentences and is non-diaphoretic. Pt comfortable sitting up on stretcher but reports discomfort when laying down. 20g IV established on left AC, labs drawn and sent, medications given as ordered. Denies any n/v/d, fever or abd pain. Awaiting CXR

## 2021-04-01 NOTE — ED PROVIDER NOTE - CLINICAL SUMMARY MEDICAL DECISION MAKING FREE TEXT BOX
71y/o F w/ h/o morbid obesity, CAD, HFpEF (EF 65-60%), severe MR and TR s/p mechanical MV and TV repair (on coumadin), Afib s/p failed ablation, HTN, HLD, HTN, MIGUEL, COPD (on 3L O2), CKD3, gout, anemia (on iron pills) p/w SOB. Bradycardic with similar morphology on previous EKGs. DDx anemia vs cardiac vs pulmonary disease. Needs tte in patient, will check labs r/o anemia, cardiac enzymes, CXR r/i infectious cause and admission medicine on tele.

## 2021-04-01 NOTE — CHART NOTE - NSCHARTNOTEFT_GEN_A_CORE
EP consulted for evaluation of variable HR. Consult appreciated.    Will Saavedra PA-C  Medicine ACP, pgr [phone]

## 2021-04-01 NOTE — CONSULT NOTE ADULT - TIME BILLING
- Review of records, telemetry, vital signs and daily labs.   - General and cardiovascular physical examination.  - Generation of cardiovascular treatment plan.  - Coordination of care.    Patient was seen and examined by me on 04/01/2021,interim events noted,labs and radiology studies reviewed.  Edwar Merida MD,FACC.  [address] [place], [place] [phone].  [phone]

## 2021-04-01 NOTE — H&P ADULT - NSHPSOCIALHISTORY_GEN_ALL_CORE
Patient lives at home with her . She has never been a smoker. No EtOH consumption or use of illicit drugs.

## 2021-04-02 LAB
ANION GAP SERPL CALC-SCNC: 7 MMOL/L — SIGNIFICANT CHANGE UP (ref 7–14)
APTT BLD: 41.1 SEC — HIGH (ref 27–36.3)
BUN SERPL-MCNC: 74 MG/DL — HIGH (ref 7–23)
CALCIUM SERPL-MCNC: 10.6 MG/DL — HIGH (ref 8.4–10.5)
CHLORIDE SERPL-SCNC: 94 MMOL/L — LOW (ref 98–107)
CO2 SERPL-SCNC: 37 MMOL/L — HIGH (ref 22–31)
COVID-19 SPIKE DOMAIN AB INTERP: POSITIVE
COVID-19 SPIKE DOMAIN ANTIBODY RESULT: >250 U/ML — HIGH
CREAT SERPL-MCNC: 1.78 MG/DL — HIGH (ref 0.5–1.3)
FERRITIN SERPL-MCNC: 42 NG/ML — SIGNIFICANT CHANGE UP (ref 15–150)
GLUCOSE SERPL-MCNC: 128 MG/DL — HIGH (ref 70–99)
HCT VFR BLD CALC: 27.6 % — LOW (ref 34.5–45)
HGB BLD-MCNC: 7.9 G/DL — LOW (ref 11.5–15.5)
INR BLD: 1.38 RATIO — HIGH (ref 0.88–1.16)
IRON SATN MFR SERPL: 10 % — LOW (ref 14–50)
IRON SATN MFR SERPL: 36 UG/DL — SIGNIFICANT CHANGE UP (ref 30–160)
MAGNESIUM SERPL-MCNC: 2.5 MG/DL — SIGNIFICANT CHANGE UP (ref 1.6–2.6)
MCHC RBC-ENTMCNC: 26.2 PG — LOW (ref 27–34)
MCHC RBC-ENTMCNC: 28.6 GM/DL — LOW (ref 32–36)
MCV RBC AUTO: 91.4 FL — SIGNIFICANT CHANGE UP (ref 80–100)
NRBC # BLD: 0 /100 WBCS — SIGNIFICANT CHANGE UP
NRBC # FLD: 0 K/UL — SIGNIFICANT CHANGE UP
PHOSPHATE SERPL-MCNC: 3.3 MG/DL — SIGNIFICANT CHANGE UP (ref 2.5–4.5)
PLATELET # BLD AUTO: 248 K/UL — SIGNIFICANT CHANGE UP (ref 150–400)
POTASSIUM SERPL-MCNC: 4.6 MMOL/L — SIGNIFICANT CHANGE UP (ref 3.5–5.3)
POTASSIUM SERPL-SCNC: 4.6 MMOL/L — SIGNIFICANT CHANGE UP (ref 3.5–5.3)
PROTHROM AB SERPL-ACNC: 15.6 SEC — HIGH (ref 10.6–13.6)
RBC # BLD: 3.02 M/UL — LOW (ref 3.8–5.2)
RBC # BLD: 3.02 M/UL — LOW (ref 3.8–5.2)
RBC # FLD: 16 % — HIGH (ref 10.3–14.5)
RETICS #: 96.3 K/UL — SIGNIFICANT CHANGE UP (ref 25–125)
RETICS/RBC NFR: 3.2 % — HIGH (ref 0.5–2.5)
SARS-COV-2 IGG+IGM SERPL QL IA: >250 U/ML — HIGH
SARS-COV-2 IGG+IGM SERPL QL IA: POSITIVE
SODIUM SERPL-SCNC: 138 MMOL/L — SIGNIFICANT CHANGE UP (ref 135–145)
TIBC SERPL-MCNC: 373 UG/DL — SIGNIFICANT CHANGE UP (ref 220–430)
TSH SERPL-MCNC: 2.18 UIU/ML — SIGNIFICANT CHANGE UP (ref 0.27–4.2)
UIBC SERPL-MCNC: 337 UG/DL — SIGNIFICANT CHANGE UP (ref 110–370)
WBC # BLD: 7.14 K/UL — SIGNIFICANT CHANGE UP (ref 3.8–10.5)
WBC # FLD AUTO: 7.14 K/UL — SIGNIFICANT CHANGE UP (ref 3.8–10.5)

## 2021-04-02 PROCEDURE — 99232 SBSQ HOSP IP/OBS MODERATE 35: CPT

## 2021-04-02 RX ORDER — HYDRALAZINE HCL 50 MG
5 TABLET ORAL ONCE
Refills: 0 | Status: COMPLETED | OUTPATIENT
Start: 2021-04-02 | End: 2021-04-02

## 2021-04-02 RX ORDER — HEPARIN SODIUM 5000 [USP'U]/ML
1300 INJECTION INTRAVENOUS; SUBCUTANEOUS
Qty: 25000 | Refills: 0 | Status: DISCONTINUED | OUTPATIENT
Start: 2021-04-02 | End: 2021-04-09

## 2021-04-02 RX ORDER — LIDOCAINE 4 G/100G
1 CREAM TOPICAL ONCE
Refills: 0 | Status: COMPLETED | OUTPATIENT
Start: 2021-04-02 | End: 2021-04-02

## 2021-04-02 RX ORDER — HEPARIN SODIUM 5000 [USP'U]/ML
INJECTION INTRAVENOUS; SUBCUTANEOUS
Qty: 25000 | Refills: 0 | Status: DISCONTINUED | OUTPATIENT
Start: 2021-04-02 | End: 2021-04-02

## 2021-04-02 RX ADMIN — HEPARIN SODIUM 1500 UNIT(S)/HR: 5000 INJECTION INTRAVENOUS; SUBCUTANEOUS at 16:26

## 2021-04-02 RX ADMIN — HEPARIN SODIUM 4000 UNIT(S): 5000 INJECTION INTRAVENOUS; SUBCUTANEOUS at 00:23

## 2021-04-02 RX ADMIN — Medication 3 MILLILITER(S): at 15:47

## 2021-04-02 RX ADMIN — PANTOPRAZOLE SODIUM 40 MILLIGRAM(S): 20 TABLET, DELAYED RELEASE ORAL at 06:50

## 2021-04-02 RX ADMIN — Medication 5 MILLIGRAM(S): at 03:58

## 2021-04-02 RX ADMIN — LIDOCAINE 1 PATCH: 4 CREAM TOPICAL at 06:13

## 2021-04-02 RX ADMIN — Medication 40 MILLIGRAM(S): at 18:23

## 2021-04-02 RX ADMIN — Medication 40 MILLIGRAM(S): at 06:49

## 2021-04-02 RX ADMIN — LIDOCAINE 1 PATCH: 4 CREAM TOPICAL at 06:50

## 2021-04-02 RX ADMIN — LISINOPRIL 10 MILLIGRAM(S): 2.5 TABLET ORAL at 06:49

## 2021-04-02 RX ADMIN — BUDESONIDE AND FORMOTEROL FUMARATE DIHYDRATE 2 PUFF(S): 160; 4.5 AEROSOL RESPIRATORY (INHALATION) at 09:47

## 2021-04-02 RX ADMIN — Medication 3 MILLILITER(S): at 04:29

## 2021-04-02 RX ADMIN — LIDOCAINE 1 PATCH: 4 CREAM TOPICAL at 18:21

## 2021-04-02 RX ADMIN — BUDESONIDE AND FORMOTEROL FUMARATE DIHYDRATE 2 PUFF(S): 160; 4.5 AEROSOL RESPIRATORY (INHALATION) at 22:55

## 2021-04-02 RX ADMIN — BUDESONIDE AND FORMOTEROL FUMARATE DIHYDRATE 2 PUFF(S): 160; 4.5 AEROSOL RESPIRATORY (INHALATION) at 00:10

## 2021-04-02 RX ADMIN — SIMVASTATIN 10 MILLIGRAM(S): 20 TABLET, FILM COATED ORAL at 22:55

## 2021-04-02 RX ADMIN — Medication 3 MILLILITER(S): at 10:51

## 2021-04-02 RX ADMIN — HEPARIN SODIUM 1300 UNIT(S)/HR: 5000 INJECTION INTRAVENOUS; SUBCUTANEOUS at 18:22

## 2021-04-02 RX ADMIN — Medication 100 MILLIGRAM(S): at 11:46

## 2021-04-02 RX ADMIN — Medication 3 MILLILITER(S): at 21:07

## 2021-04-02 RX ADMIN — HEPARIN SODIUM 1300 UNIT(S)/HR: 5000 INJECTION INTRAVENOUS; SUBCUTANEOUS at 00:11

## 2021-04-02 NOTE — PROGRESS NOTE ADULT - TIME BILLING
- Review of records, telemetry, vital signs and daily labs.   - General and cardiovascular physical examination.  - Generation of cardiovascular treatment plan.  - Coordination of care.    Patient was seen and examined by me on 04/02/2021,interim events noted,labs and radiology studies reviewed.  Edwar Merida MD,FACC.  [address] [place], [place] [id number].  [phone]

## 2021-04-02 NOTE — CONSULT NOTE ADULT - SUBJECTIVE AND OBJECTIVE BOX
HPI:  70F admitted for dyspnea in the setting of complex cardiac comorbidities. ENT consulted for epistaxis management in the setting of AC (mechanical heart valve) and HTN. Minimal EBL, +prior episodes at home managed conservatively, L>R. Unable to be controlled with afrin and pressure.       T(C): 36.7 (04-02-21 @ 02:09), Max: 36.8 (04-01-21 @ 19:52)  HR: 72 (04-02-21 @ 04:29) (48 - 87)  BP: 158/65 (04-02-21 @ 02:09) (120/57 - 188/83)  RR: 17 (04-02-21 @ 02:09) (17 - 21)  SpO2: 95% (04-02-21 @ 04:29) (95% - 100%)  NAD, AOx3  unlabored breathing  EOMI  voice: normal  AU: normal to external exam  NC:   Nasal cannula  L septum with focal points of moderately bloody oozing. Chitozolve hemostatic aborbable splint placed  R septum with trickle of blood from vessels on mid septum, surgicel placed  OC/OP: OP without bloody staining/posterior drainage  neck: soft/flat    A/P:  70F with epistaxis in the setting of complex cardiac comorbidities (mechanical heart valve, on AC) and HTN. Source localized to septum bilaterally L>R. Hemostasis achieved with dissolvable hemostatic dressings, afrin, and hydralazine.  -If evidence of rebleeding please use afrin and hold pressure for 20 continuos minutes  -HTN control  -Humidified NC use. Limit use as able  -saline nasal spray at bedside to use throughout the day to keep mucosa hydrated  -Vaseline to nares in AM and before bed  -please page ent for any persistent bleeding, questions, concerns  -d/w attending, will update with any further recs

## 2021-04-02 NOTE — CHART NOTE - NSCHARTNOTEFT_GEN_A_CORE
ACP NIGHT MEDICINE COVERAGE.    Notified by RN, patient with active nose bleed via Left nostril x 10minutes, pt also on hep gtt to coumadin bridge for mechanical valve. Heparin gtt paused, instructed RN to apply Afrin to gauze, compact in left nostril and apply pressure. Pt seen and examined at bedside, upon arrival basin noted at edge of bed filled with semi-saturated gauze/tissues with blood. ENT paged, case discussed- recommend administering Afrin to affected side without gauze, and apply consecutive pressure x 20minutes. After 15minutes, patient noted to have bleeding start on right nostril, ENT made aware and will see patient. Pressure held with intermittent use of ice at bridge of nose for 1-1.5hours by two providers and RN prior to ENT coming. 5mg IVP Hydralazine administered as ENT wanted better BP control. Later followed up, ENT reports bleeding coming from small source on Septum. He placed hemostatic absorbable packing on left nostril and Surgicel on right nostril. Recommend BP control and Afrin PRN. If bleeding worsens, will page again for possible rhino-rocket placement. Discussed with Pharmacy, regarding hep gtt as patient already received 3mg Coumadin last night and has been on hep since arrival. PT/INR ordered w/ AM meds. Will continue to monitor    Vital Signs Last 24 Hrs  T(C): 36.7 (02 Apr 2021 02:09), Max: 36.9 (01 Apr 2021 05:05)  T(F): 98 (02 Apr 2021 02:09), Max: 98.4 (01 Apr 2021 05:05)  HR: 72 (02 Apr 2021 04:29) (48 - 87)  BP: 158/65 (02 Apr 2021 02:09) (120/57 - 188/83)  RR: 17 (02 Apr 2021 02:09) (17 - 21)  SpO2: 95% (02 Apr 2021 04:29) (95% - 100%)    Mary Spence PA  Medicine [id number] ACP NIGHT MEDICINE COVERAGE.    Notified by RN, patient with active nose bleed via Left nostril x 10minutes, pt also on hep gtt to coumadin bridge for mechanical valve. Heparin gtt paused, instructed RN to apply Afrin to gauze, compact in left nostril and apply pressure. Pt seen and examined at bedside, upon arrival basin noted at edge of bed filled with semi-saturated gauze/tissues with blood. ENT paged, case discussed- recommend administering Afrin to affected side without gauze, and apply consecutive pressure x 20minutes. After 15minutes, patient noted to have bleeding start on right nostril, ENT made aware and will see patient. Pressure held with intermittent use of ice at bridge of nose for 1-1.5hours by two providers and RN prior to ENT coming. 5mg IVP Hydralazine administered as ENT wanted better BP control. Later followed up, ENT reports bleeding coming from small source on Septum. He placed hemostatic absorbable packing on left nostril and Surgicel on right nostril. Recommend BP control and Afrin PRN. If bleeding worsens, will page again for possible rhino-rocket placement. Pt at this point off hep gtt for 3hrs, will order PT and INR with AM labs. Will follow up results.        Vital Signs Last 24 Hrs  T(C): 36.7 (02 Apr 2021 02:09), Max: 36.9 (01 Apr 2021 05:05)  T(F): 98 (02 Apr 2021 02:09), Max: 98.4 (01 Apr 2021 05:05)  HR: 72 (02 Apr 2021 04:29) (48 - 87)  BP: 158/65 (02 Apr 2021 02:09) (120/57 - 188/83)  RR: 17 (02 Apr 2021 02:09) (17 - 21)  SpO2: 95% (02 Apr 2021 04:29) (95% - 100%)    Mary Spence PA  Medicine [id number]

## 2021-04-03 LAB
ANION GAP SERPL CALC-SCNC: 5 MMOL/L — LOW (ref 7–14)
ANION GAP SERPL CALC-SCNC: 8 MMOL/L — SIGNIFICANT CHANGE UP (ref 7–14)
APTT BLD: 103 SEC — HIGH (ref 27–36.3)
APTT BLD: 158.6 SEC — CRITICAL HIGH (ref 27–36.3)
APTT BLD: 83.1 SEC — HIGH (ref 27–36.3)
BUN SERPL-MCNC: 68 MG/DL — HIGH (ref 7–23)
BUN SERPL-MCNC: 73 MG/DL — HIGH (ref 7–23)
CALCIUM SERPL-MCNC: 10.1 MG/DL — SIGNIFICANT CHANGE UP (ref 8.4–10.5)
CALCIUM SERPL-MCNC: 9.9 MG/DL — SIGNIFICANT CHANGE UP (ref 8.4–10.5)
CHLORIDE SERPL-SCNC: 92 MMOL/L — LOW (ref 98–107)
CHLORIDE SERPL-SCNC: 95 MMOL/L — LOW (ref 98–107)
CO2 SERPL-SCNC: 36 MMOL/L — HIGH (ref 22–31)
CO2 SERPL-SCNC: 38 MMOL/L — HIGH (ref 22–31)
CREAT SERPL-MCNC: 1.79 MG/DL — HIGH (ref 0.5–1.3)
CREAT SERPL-MCNC: 1.84 MG/DL — HIGH (ref 0.5–1.3)
GLUCOSE SERPL-MCNC: 106 MG/DL — HIGH (ref 70–99)
GLUCOSE SERPL-MCNC: 91 MG/DL — SIGNIFICANT CHANGE UP (ref 70–99)
HCT VFR BLD CALC: 26.9 % — LOW (ref 34.5–45)
HCT VFR BLD CALC: 27 % — LOW (ref 34.5–45)
HGB BLD-MCNC: 7.8 G/DL — LOW (ref 11.5–15.5)
HGB BLD-MCNC: 7.8 G/DL — LOW (ref 11.5–15.5)
INR BLD: 1.45 RATIO — HIGH (ref 0.88–1.16)
MAGNESIUM SERPL-MCNC: 2.5 MG/DL — SIGNIFICANT CHANGE UP (ref 1.6–2.6)
MAGNESIUM SERPL-MCNC: 2.5 MG/DL — SIGNIFICANT CHANGE UP (ref 1.6–2.6)
MCHC RBC-ENTMCNC: 26.8 PG — LOW (ref 27–34)
MCHC RBC-ENTMCNC: 27 PG — SIGNIFICANT CHANGE UP (ref 27–34)
MCHC RBC-ENTMCNC: 28.9 GM/DL — LOW (ref 32–36)
MCHC RBC-ENTMCNC: 29 GM/DL — LOW (ref 32–36)
MCV RBC AUTO: 92.8 FL — SIGNIFICANT CHANGE UP (ref 80–100)
MCV RBC AUTO: 93.1 FL — SIGNIFICANT CHANGE UP (ref 80–100)
NRBC # BLD: 0 /100 WBCS — SIGNIFICANT CHANGE UP
NRBC # BLD: 0 /100 WBCS — SIGNIFICANT CHANGE UP
NRBC # FLD: 0 K/UL — SIGNIFICANT CHANGE UP
NRBC # FLD: 0 K/UL — SIGNIFICANT CHANGE UP
PHOSPHATE SERPL-MCNC: 3.8 MG/DL — SIGNIFICANT CHANGE UP (ref 2.5–4.5)
PLATELET # BLD AUTO: 267 K/UL — SIGNIFICANT CHANGE UP (ref 150–400)
PLATELET # BLD AUTO: 270 K/UL — SIGNIFICANT CHANGE UP (ref 150–400)
POTASSIUM SERPL-MCNC: 4.8 MMOL/L — SIGNIFICANT CHANGE UP (ref 3.5–5.3)
POTASSIUM SERPL-MCNC: 5.1 MMOL/L — SIGNIFICANT CHANGE UP (ref 3.5–5.3)
POTASSIUM SERPL-SCNC: 4.8 MMOL/L — SIGNIFICANT CHANGE UP (ref 3.5–5.3)
POTASSIUM SERPL-SCNC: 5.1 MMOL/L — SIGNIFICANT CHANGE UP (ref 3.5–5.3)
PROTHROM AB SERPL-ACNC: 16.2 SEC — HIGH (ref 10.6–13.6)
RBC # BLD: 2.89 M/UL — LOW (ref 3.8–5.2)
RBC # BLD: 2.91 M/UL — LOW (ref 3.8–5.2)
RBC # FLD: 16.1 % — HIGH (ref 10.3–14.5)
RBC # FLD: 16.2 % — HIGH (ref 10.3–14.5)
SODIUM SERPL-SCNC: 136 MMOL/L — SIGNIFICANT CHANGE UP (ref 135–145)
SODIUM SERPL-SCNC: 138 MMOL/L — SIGNIFICANT CHANGE UP (ref 135–145)
WBC # BLD: 6.35 K/UL — SIGNIFICANT CHANGE UP (ref 3.8–10.5)
WBC # BLD: 6.41 K/UL — SIGNIFICANT CHANGE UP (ref 3.8–10.5)
WBC # FLD AUTO: 6.35 K/UL — SIGNIFICANT CHANGE UP (ref 3.8–10.5)
WBC # FLD AUTO: 6.41 K/UL — SIGNIFICANT CHANGE UP (ref 3.8–10.5)

## 2021-04-03 RX ORDER — WARFARIN SODIUM 2.5 MG/1
3 TABLET ORAL ONCE
Refills: 0 | Status: COMPLETED | OUTPATIENT
Start: 2021-04-03 | End: 2021-04-03

## 2021-04-03 RX ADMIN — Medication 3 MILLILITER(S): at 09:54

## 2021-04-03 RX ADMIN — BUDESONIDE AND FORMOTEROL FUMARATE DIHYDRATE 2 PUFF(S): 160; 4.5 AEROSOL RESPIRATORY (INHALATION) at 20:57

## 2021-04-03 RX ADMIN — SIMVASTATIN 10 MILLIGRAM(S): 20 TABLET, FILM COATED ORAL at 21:01

## 2021-04-03 RX ADMIN — BUDESONIDE AND FORMOTEROL FUMARATE DIHYDRATE 2 PUFF(S): 160; 4.5 AEROSOL RESPIRATORY (INHALATION) at 08:15

## 2021-04-03 RX ADMIN — LISINOPRIL 10 MILLIGRAM(S): 2.5 TABLET ORAL at 06:26

## 2021-04-03 RX ADMIN — Medication 2 SPRAY(S): at 06:27

## 2021-04-03 RX ADMIN — HEPARIN SODIUM 1000 UNIT(S)/HR: 5000 INJECTION INTRAVENOUS; SUBCUTANEOUS at 15:27

## 2021-04-03 RX ADMIN — Medication 40 MILLIGRAM(S): at 06:26

## 2021-04-03 RX ADMIN — Medication 3 MILLILITER(S): at 03:44

## 2021-04-03 RX ADMIN — HEPARIN SODIUM 1300 UNIT(S)/HR: 5000 INJECTION INTRAVENOUS; SUBCUTANEOUS at 06:25

## 2021-04-03 RX ADMIN — WARFARIN SODIUM 3 MILLIGRAM(S): 2.5 TABLET ORAL at 18:43

## 2021-04-03 RX ADMIN — HEPARIN SODIUM 800 UNIT(S)/HR: 5000 INJECTION INTRAVENOUS; SUBCUTANEOUS at 22:07

## 2021-04-03 RX ADMIN — PANTOPRAZOLE SODIUM 40 MILLIGRAM(S): 20 TABLET, DELAYED RELEASE ORAL at 06:26

## 2021-04-03 RX ADMIN — Medication 100 MILLIGRAM(S): at 12:37

## 2021-04-03 RX ADMIN — Medication 3 MILLILITER(S): at 21:06

## 2021-04-03 RX ADMIN — Medication 3 MILLILITER(S): at 16:17

## 2021-04-03 RX ADMIN — HEPARIN SODIUM 0 UNIT(S)/HR: 5000 INJECTION INTRAVENOUS; SUBCUTANEOUS at 14:20

## 2021-04-03 RX ADMIN — Medication 40 MILLIGRAM(S): at 18:43

## 2021-04-04 LAB
ANION GAP SERPL CALC-SCNC: 6 MMOL/L — LOW (ref 7–14)
APPEARANCE UR: CLEAR — SIGNIFICANT CHANGE UP
APTT BLD: 77.8 SEC — HIGH (ref 27–36.3)
APTT BLD: 87.4 SEC — HIGH (ref 27–36.3)
BILIRUB UR-MCNC: NEGATIVE — SIGNIFICANT CHANGE UP
BLD GP AB SCN SERPL QL: NEGATIVE — SIGNIFICANT CHANGE UP
BUN SERPL-MCNC: 75 MG/DL — HIGH (ref 7–23)
CALCIUM SERPL-MCNC: 9.7 MG/DL — SIGNIFICANT CHANGE UP (ref 8.4–10.5)
CHLORIDE SERPL-SCNC: 89 MMOL/L — LOW (ref 98–107)
CHLORIDE UR-SCNC: 53 MMOL/L — SIGNIFICANT CHANGE UP
CO2 SERPL-SCNC: 37 MMOL/L — HIGH (ref 22–31)
COLOR SPEC: SIGNIFICANT CHANGE UP
CREAT ?TM UR-MCNC: 56 MG/DL — SIGNIFICANT CHANGE UP
CREAT SERPL-MCNC: 2.07 MG/DL — HIGH (ref 0.5–1.3)
DIFF PNL FLD: NEGATIVE — SIGNIFICANT CHANGE UP
GLUCOSE SERPL-MCNC: 97 MG/DL — SIGNIFICANT CHANGE UP (ref 70–99)
GLUCOSE UR QL: NEGATIVE — SIGNIFICANT CHANGE UP
HCT VFR BLD CALC: 27.1 % — LOW (ref 34.5–45)
HGB BLD-MCNC: 7.7 G/DL — LOW (ref 11.5–15.5)
INR BLD: 1.35 RATIO — HIGH (ref 0.88–1.16)
KETONES UR-MCNC: NEGATIVE — SIGNIFICANT CHANGE UP
LEUKOCYTE ESTERASE UR-ACNC: NEGATIVE — SIGNIFICANT CHANGE UP
MAGNESIUM SERPL-MCNC: 2.5 MG/DL — SIGNIFICANT CHANGE UP (ref 1.6–2.6)
MCHC RBC-ENTMCNC: 25.9 PG — LOW (ref 27–34)
MCHC RBC-ENTMCNC: 28.4 GM/DL — LOW (ref 32–36)
MCV RBC AUTO: 91.2 FL — SIGNIFICANT CHANGE UP (ref 80–100)
NITRITE UR-MCNC: NEGATIVE — SIGNIFICANT CHANGE UP
NRBC # BLD: 0 /100 WBCS — SIGNIFICANT CHANGE UP
NRBC # FLD: 0 K/UL — SIGNIFICANT CHANGE UP
PH UR: 7 — SIGNIFICANT CHANGE UP (ref 5–8)
PLATELET # BLD AUTO: 288 K/UL — SIGNIFICANT CHANGE UP (ref 150–400)
POTASSIUM SERPL-MCNC: 4.7 MMOL/L — SIGNIFICANT CHANGE UP (ref 3.5–5.3)
POTASSIUM SERPL-SCNC: 4.7 MMOL/L — SIGNIFICANT CHANGE UP (ref 3.5–5.3)
POTASSIUM UR-SCNC: 33.9 MMOL/L — SIGNIFICANT CHANGE UP
PROT UR-MCNC: NEGATIVE — SIGNIFICANT CHANGE UP
PROTHROM AB SERPL-ACNC: 15.2 SEC — HIGH (ref 10.6–13.6)
RBC # BLD: 2.97 M/UL — LOW (ref 3.8–5.2)
RBC # FLD: 16 % — HIGH (ref 10.3–14.5)
RH IG SCN BLD-IMP: POSITIVE — SIGNIFICANT CHANGE UP
SODIUM SERPL-SCNC: 132 MMOL/L — LOW (ref 135–145)
SODIUM UR-SCNC: 72 MMOL/L — SIGNIFICANT CHANGE UP
SP GR SPEC: 1.01 — SIGNIFICANT CHANGE UP (ref 1.01–1.02)
UROBILINOGEN FLD QL: SIGNIFICANT CHANGE UP
WBC # BLD: 7.47 K/UL — SIGNIFICANT CHANGE UP (ref 3.8–10.5)
WBC # FLD AUTO: 7.47 K/UL — SIGNIFICANT CHANGE UP (ref 3.8–10.5)

## 2021-04-04 PROCEDURE — 99223 1ST HOSP IP/OBS HIGH 75: CPT

## 2021-04-04 RX ORDER — WARFARIN SODIUM 2.5 MG/1
3 TABLET ORAL ONCE
Refills: 0 | Status: COMPLETED | OUTPATIENT
Start: 2021-04-04 | End: 2021-04-04

## 2021-04-04 RX ADMIN — OXYMETAZOLINE HYDROCHLORIDE 2 SPRAY(S): 0.5 SPRAY NASAL at 03:10

## 2021-04-04 RX ADMIN — LISINOPRIL 10 MILLIGRAM(S): 2.5 TABLET ORAL at 05:52

## 2021-04-04 RX ADMIN — OXYMETAZOLINE HYDROCHLORIDE 2 SPRAY(S): 0.5 SPRAY NASAL at 19:00

## 2021-04-04 RX ADMIN — Medication 3 MILLILITER(S): at 22:09

## 2021-04-04 RX ADMIN — Medication 100 MILLIGRAM(S): at 12:12

## 2021-04-04 RX ADMIN — BUDESONIDE AND FORMOTEROL FUMARATE DIHYDRATE 2 PUFF(S): 160; 4.5 AEROSOL RESPIRATORY (INHALATION) at 09:49

## 2021-04-04 RX ADMIN — BUDESONIDE AND FORMOTEROL FUMARATE DIHYDRATE 2 PUFF(S): 160; 4.5 AEROSOL RESPIRATORY (INHALATION) at 21:21

## 2021-04-04 RX ADMIN — PANTOPRAZOLE SODIUM 40 MILLIGRAM(S): 20 TABLET, DELAYED RELEASE ORAL at 05:52

## 2021-04-04 RX ADMIN — Medication 40 MILLIGRAM(S): at 17:21

## 2021-04-04 RX ADMIN — Medication 3 MILLILITER(S): at 10:47

## 2021-04-04 RX ADMIN — SIMVASTATIN 10 MILLIGRAM(S): 20 TABLET, FILM COATED ORAL at 22:25

## 2021-04-04 RX ADMIN — Medication 3 MILLILITER(S): at 15:55

## 2021-04-04 RX ADMIN — WARFARIN SODIUM 3 MILLIGRAM(S): 2.5 TABLET ORAL at 17:21

## 2021-04-04 RX ADMIN — Medication 40 MILLIGRAM(S): at 05:52

## 2021-04-04 RX ADMIN — HEPARIN SODIUM 800 UNIT(S)/HR: 5000 INJECTION INTRAVENOUS; SUBCUTANEOUS at 05:09

## 2021-04-04 RX ADMIN — HEPARIN SODIUM 800 UNIT(S)/HR: 5000 INJECTION INTRAVENOUS; SUBCUTANEOUS at 14:42

## 2021-04-04 NOTE — CONSULT NOTE ADULT - ASSESSMENT
1. MISAEL- in the setting of HF with diuresis.  Pt remains SOB despite aggressive diuresis.  For now, would continue diuresis but rapidly rising creatinine is concerning.  Pt may require ultrafiltration/dialysis to achieve euvolemia.  Given pt does not have systolic HF, unclear if there is a role for inotropes.  D/c lisinopril for now.  Will d/w cardiology.  2. Hyponatremia- in the setting of HF.  Worsening despite diuresis.  Strict fluid restriction.  Consider tolvaptan.  Check urine osmolality along with urine sodium.  3. LE edema- as above.  4. HTN- d/c lisinopril due to MISAEL. Continue with other anti-hypertensive medications for now. Monitor BP.        [facility] NEPHROLOGY  Rodrigue Amador M.D.  Rob Perez D.O.  Ana Song M.D.  Lucrecia López, MSN, ANP-C    Telephone: [phone]  Facsimile: [phone]    [address] [place] [address]

## 2021-04-04 NOTE — CONSULT NOTE ADULT - SUBJECTIVE AND OBJECTIVE BOX
PULMONARY CONSULT NOTE      DAMARI GUERRERO  MRN-[id number]    Patient is a 70y old  Female who presents with a chief complaint of SOB (03 Apr 2021 11:02)      HISTORY OF PRESENT ILLNESS:  69 yo female with morbid obesity, CAD,  HFpEF (EF 65-60%), severe MR and TR s/p mechanical MV and TV repair (on coumadin), Afib s/p failed ablation, HTN, HLD, HTN, MIGUEL, COPD (on 3L O2), CKD3, gout, anemia (on iron pills) who presents with worsening dyspnea since 4 days prior to admission.  has a NIVV at home- states its not working correctly. on 02 at home. no cough, no wheezing,   + LE swelling and dyspnea similar to previous admissions      Allergies    No Known Allergies    Intolerances        PAST MEDICAL & SURGICAL HISTORY:  Atrial fibrillation  S/P Ablation    Pulmonary hypertension    MIGUEL (obstructive sleep apnea)    COPD (chronic obstructive pulmonary disease)  on home O2    CHF (congestive heart failure)    HTN (hypertension)    Gout    Mitral valve replaced    Tricuspid valve replaced    CHF (congestive heart failure)    Hypertension    COPD (chronic obstructive pulmonary disease)    History of mitral valve replacement with mechanical valve    Tricuspid valve replaced  mechanical    No significant past surgical history            FAMILY HISTORY:  Family history of hypertension (Father, Sibling)    Family history of stroke    Family history of hypertension (Mother)      Prescriptions:  budesonide-formoterol 160 mcg-4.5 mcg/inh inhalation aerosol: 2  inhaled 2 times a day (01 Apr 2021 11:57)  bumetanide 2 mg oral tablet: 1 tab(s) orally once a day (01 Apr 2021 11:57)  metoprolol succinate 25 mg oral tablet, extended release: 1 tab(s) orally once a day (01 Apr 2021 11:57)  ramipril 2.5 mg oral capsule: 1 cap(s) orally once a day  (01 Apr 2021 11:57)  simvastatin 10 mg oral tablet: 1 tab(s) orally once a day (at bedtime) (01 Apr 2021 11:57)  sodium chloride 0.65% nasal spray: 2 spray(s) nasal 4 times a day  (01 Apr 2021 11:57)  warfarin 2.5 mg oral tablet: 1 tab(s) orally once a day  (01 Apr 2021 11:57)      SOCIAL HISTORY  Smoking History: denies    REVIEW OF SYSTEMS: dyspnea  dry cough    ital Signs Last 24 Hrs  T(C): 36.7 (04 Apr 2021 13:14), Max: 36.8 (04 Apr 2021 05:49)  T(F): 98 (04 Apr 2021 13:14), Max: 98.2 (04 Apr 2021 05:49)  HR: 92 (04 Apr 2021 13:14) (65 - 92)  BP: 130/60 (04 Apr 2021 13:14) (109/52 - 145/55)  BP(mean): --  RR: 18 (04 Apr 2021 13:14) (16 - 18)  SpO2: 100% (04 Apr 2021 13:14) (95% - 100%)    PHYSICAL EXAMINATION:    GENERAL: The patient is a well-developed, well-nourished female in no apparent distress.     HEENT: Head is normocephalic and atraumatic.   NECK: Supple.     LUNGS: Clear to auscultation without wheezing, rales, or rhonchi. Respirations unlabored    HEART: Regular rate and rhythm without murmur.    ABDOMEN: Soft, nontender, and nondistended.  No hepatosplenomegaly is noted.    EXTREMITIES:+1 edema b/l    NEUROLOGIC: Grossly intact      MEDICATIONS  (STANDING):  albuterol/ipratropium for Nebulization 3 milliLiter(s) Nebulizer every 6 hours  allopurinol 100 milliGRAM(s) Oral daily  budesonide 160 MICROgram(s)/formoterol 4.5 MICROgram(s) Inhaler 2 Puff(s) Inhalation two times a day  furosemide   Injectable 40 milliGRAM(s) IV Push two times a day  heparin  Infusion. 1300 Unit(s)/Hr (13 mL/Hr) IV Continuous <Continuous>  lisinopril 10 milliGRAM(s) Oral daily  pantoprazole    Tablet 40 milliGRAM(s) Oral before breakfast  simvastatin 10 milliGRAM(s) Oral at bedtime  warfarin 3 milliGRAM(s) Oral once      MEDICATIONS  (PRN):  aluminum hydroxide/magnesium hydroxide/simethicone Suspension 30 milliLiter(s) Oral every 6 hours PRN Dyspepsia  heparin   Injectable 8500 Unit(s) IV Push every 6 hours PRN For aPTT less than 40  heparin   Injectable 4000 Unit(s) IV Push every 6 hours PRN For aPTT between 40 - 57  oxymetazoline 0.05% Nasal Spray 2 Spray(s) Both Nostrils every 12 hours PRN Nose bleeds  sodium chloride 0.65% Nasal 2 Spray(s) Both Nostrils four times a day PRN Nasal Congestion        LABS:   CBC Full  -  ( 04 Apr 2021 04:26 )  WBC Count : 7.47 K/uL  RBC Count : 2.97 M/uL  Hemoglobin : 7.7 g/dL  Hematocrit : 27.1 %  Platelet Count - Automated : 288 K/uL  Mean Cell Volume : 91.2 fL  Mean Cell Hemoglobin : 25.9 pg  Mean Cell Hemoglobin Concentration : 28.4 gm/dL  Auto Neutrophil # : x  Auto Lymphocyte # : x  Auto Monocyte # : x  Auto Eosinophil # : x  Auto Basophil # : x  Auto Neutrophil % : x  Auto Lymphocyte % : x  Auto Monocyte % : x  Auto Eosinophil % : x  Auto Basophil % : x    PT/INR - ( 04 Apr 2021 11:32 )   PT: 15.2 sec;   INR: 1.35 ratio         PTT - ( 04 Apr 2021 11:32 )  PTT:77.8 sec  04-04    132<L>  |  89<L>  |  75<H>  ----------------------------<  97  4.7   |  37<H>  |  2.07<H>    Ca    9.7      04 Apr 2021 04:26  Phos  3.8     04-03  Mg     2.5     04-04                  RADIOLOGY & ADDITIONAL STUDIES:    ECHO:< from: Xray Chest 1 View- PORTABLE-Urgent (04.01.21 @ 03:56) >    EXAM:  XR CHEST PORTABLE URGENT 1V        PROCEDURE DATE:  Apr 1 2021         INTERPRETATION:  CLINICAL INFORMATION: Chest pain.    EXAM: Frontal radiograph of the chest.    COMPARISON: Chest radiograph from 1/11/2021    FINDINGS:  Cardiomegaly. Status post mitral valve repair.    Bilateral perihilar opacities, likely representing pulmonary edema.    Sternotomy.    IMPRESSION: Bilateral perihilar opacities with cardiomegaly similar to the last exam consistent with CHF.            CHAPIS MORAES MD; Resident Radiology  This document has been electronically signed.  DELIA PEDERSEN MD; Attending Radiologist  This document has been electronically signed. Apr 1 2021  5:38AM    < end of copied text >  < from: Transthoracic Echocardiogram (01.08.21 @ 15:53) >  ------------------------------------------------------------------------  Conclusions:  1. Mechanical prosthetic mitral valve replacement. Peak  mitral valve gradient equals 15 mm Hg, mean transmitral  valve gradient equals 6 mm Hg, which is elevated even in  the setting of a mechanical prosthetic mitral valve  replacement (Heart rate 69 bpm).  2. Refer to prior echos for assesssment of LV function.  Septal motion consistent with cardiac surgery, right  ventricular overload.  3. Right atrial enlargement.  4. Right ventricular enlargement with decreased right  ventricular systolic function.  5. An annuloplasty ring is seen in the tricuspid position.  PG 3 mm hg, MG 1 mm hg. No tricuspid regurgitation.  6. Agitated saline injection demonstrates evidence of a  patent foramen ovale/VSD.  No color Doppler evidence of  VSD.  ------------------------------------------------------------------------  Confirmed on  1/8/2021 - 17:47:19 by SHARON Christy  ------------------------------------------------------------------------    < end of copied text >      ASSESSMENT:  70 with chf, miguel/ ohs admitted for dyspnea    PLAN:  refusing BPAP  f/u cardio management  nebs PRN    Thank you for allowing me to participate in the care of this patient.  Please feel free to call me for any questions/concerns.      Irma Eaton DO  [facility] Pulmonary/Sleep Medicine  [phone]

## 2021-04-04 NOTE — CONSULT NOTE ADULT - SUBJECTIVE AND OBJECTIVE BOX
[facility] NEPHROLOGY- CONSULTATION NOTE    Patient is a 70y Female PMH of morbid obesity, CAD,  HFpEF (EF 65-60%), severe MR and TR s/p mechanical MV and TV repair (on coumadin), Afib s/p failed ablation, HTN, HLD, HTN, MIGUEL, COPD (on 3L O2), CKD3, gout, anemia (on iron pills) who presents with worsening dyspnea.      Pt is currently on furosemide 40mg iv bid.  She still reports SOB.  Pt is also on lisinopril 40mg daily.    PAST MEDICAL & SURGICAL HISTORY:  Atrial fibrillation  S/P Ablation    Pulmonary hypertension    MIGUEL (obstructive sleep apnea)    COPD (chronic obstructive pulmonary disease)  on home O2    CHF (congestive heart failure)    HTN (hypertension)    Gout    Mitral valve replaced    Tricuspid valve replaced    CHF (congestive heart failure)    Hypertension    COPD (chronic obstructive pulmonary disease)    History of mitral valve replacement with mechanical valve    Tricuspid valve replaced  mechanical    No significant past surgical history      No Known Allergies    Home Medications Reviewed  Hospital Medications:   MEDICATIONS  (STANDING):  albuterol/ipratropium for Nebulization 3 milliLiter(s) Nebulizer every 6 hours  allopurinol 100 milliGRAM(s) Oral daily  budesonide 160 MICROgram(s)/formoterol 4.5 MICROgram(s) Inhaler 2 Puff(s) Inhalation two times a day  furosemide   Injectable 40 milliGRAM(s) IV Push two times a day  heparin  Infusion. 1300 Unit(s)/Hr (13 mL/Hr) IV Continuous <Continuous>  pantoprazole    Tablet 40 milliGRAM(s) Oral before breakfast  simvastatin 10 milliGRAM(s) Oral at bedtime    SOCIAL HISTORY:  Denies ETOh,Smoking,   FAMILY HISTORY:  Family history of hypertension (Father, Sibling)    Family history of stroke    Family history of hypertension (Mother)      REVIEW OF SYSTEMS:  CONSTITUTIONAL: +weakness, no fevers or chills  EYES/ENT: No visual changes;  No vertigo or throat pain   NECK: No pain or stiffness  RESPIRATORY: No cough, wheezing, hemoptysis; + shortness of breath  CARDIOVASCULAR: No chest pain or palpitations.  GASTROINTESTINAL: + abdominal pain. No nausea, vomiting, or hematemesis; No diarrhea or constipation. No melena or hematochezia.  GENITOURINARY: No dysuria, frequency, foamy urine, urinary urgency, incontinence or hematuria  NEUROLOGICAL: No numbness or weakness  SKIN: No itching, burning, rashes, or lesions   VASCULAR: + bilateral lower extremity edema.   All other review of systems is negative unless indicated above.    VITALS:  T(F): 98.2 (21 @ 22:23), Max: 98.2 (21 @ 05:49)  HR: 66 (21 @ 22:23)  BP: 122/58 (21 @ 22:23)  RR: 19 (21 @ 22:23)  SpO2: 100% (21 @ 22:23)  Wt(kg): --     @ 07:01  -   @ 07:00  --------------------------------------------------------  IN: 954 mL / OUT: 0 mL / NET: 954 mL      PHYSICAL EXAM:  Constitutional: mildly SOB- mildly uncomfortable  HEENT: anicteric sclera, oropharynx clear, MMM  Neck: No JVD  Respiratory: bibasilar crackles  Cardiovascular: S1, S2, RRR  Gastrointestinal: BS+, soft, NT/ND  Extremities: No cyanosis or clubbing. + B LE edema  Neurological: A/O x 3, no focal deficits  Psychiatric: Normal mood, normal affect  : No CVA tenderness. No sarmiento.   Skin: No rashes      LABS:    132 <--, 138 <--, 136 <--, 138 <--, 138 <--  132<L>  |  89<L>  |  75<H>  ----------------------------<  97  4.7   |  37<H>  |  2.07<H>    Ca    9.7      2021 04:26  Phos  3.8     03  Mg     2.5     04      Creatinine Trend: 2.07 <--, 1.84 <--, 1.79 <--, 1.78 <--, 1.63 <--                        7.7    7.47  )-----------( 288      ( 2021 04:26 )             27.1     Urine Studies:  Urinalysis Basic - ( 2021 09:47 )    Color: Light Yellow / Appearance: Clear / S.012 / pH:   Gluc:  / Ketone: Negative  / Bili: Negative / Urobili: <2 mg/dL   Blood:  / Protein: Negative / Nitrite: Negative   Leuk Esterase: Negative / RBC:  / WBC    Sq Epi:  / Non Sq Epi:  / Bacteria:       Sodium, Random Urine: 72 mmol/L ( @ 09:47)  Potassium, Random Urine: 33.9 mmol/L ( @ 09:47)  Chloride, Random Urine: 53 mmol/L ( @ 09:47)  Creatinine, Random Urine: 56 mg/dL ( @ 09:47)        RADIOLOGY & ADDITIONAL STUDIES:    < from: Xray Chest 1 View- PORTABLE-Urgent (21 @ 03:56) >    EXAM:  XR CHEST PORTABLE URGENT 1V        PROCEDURE DATE:  2021         INTERPRETATION:  CLINICAL INFORMATION: Chest pain.    EXAM: Frontal radiograph of the chest.    COMPARISON: Chest radiograph from 2021    FINDINGS:  Cardiomegaly. Status post mitral valve repair.    Bilateral perihilar opacities, likely representing pulmonary edema.    Sternotomy.    IMPRESSION: Bilateral perihilar opacities with cardiomegaly similar to the last exam consistent with CHF.            CHAIPS MORAES MD; Resident Radiology  This document has been electronically signed.  DELIA PEDERSEN MD; Attending Radiologist  This document has been electronically signed. 2021  5:38AM    < end of copied text >

## 2021-04-05 LAB
ANION GAP SERPL CALC-SCNC: 8 MMOL/L — SIGNIFICANT CHANGE UP (ref 7–14)
APTT BLD: 53.5 SEC — HIGH (ref 27–36.3)
APTT BLD: 56.8 SEC — HIGH (ref 27–36.3)
APTT BLD: 61.5 SEC — HIGH (ref 27–36.3)
BUN SERPL-MCNC: 79 MG/DL — HIGH (ref 7–23)
CALCIUM SERPL-MCNC: 9.2 MG/DL — SIGNIFICANT CHANGE UP (ref 8.4–10.5)
CHLORIDE SERPL-SCNC: 93 MMOL/L — LOW (ref 98–107)
CO2 SERPL-SCNC: 33 MMOL/L — HIGH (ref 22–31)
CREAT ?TM UR-MCNC: 58 MG/DL — SIGNIFICANT CHANGE UP
CREAT SERPL-MCNC: 1.96 MG/DL — HIGH (ref 0.5–1.3)
GLUCOSE SERPL-MCNC: 102 MG/DL — HIGH (ref 70–99)
HCT VFR BLD CALC: 25.2 % — LOW (ref 34.5–45)
HGB BLD-MCNC: 7.4 G/DL — LOW (ref 11.5–15.5)
INR BLD: 1.29 RATIO — HIGH (ref 0.88–1.16)
MAGNESIUM SERPL-MCNC: 2.4 MG/DL — SIGNIFICANT CHANGE UP (ref 1.6–2.6)
MCHC RBC-ENTMCNC: 26.8 PG — LOW (ref 27–34)
MCHC RBC-ENTMCNC: 29.4 GM/DL — LOW (ref 32–36)
MCV RBC AUTO: 91.3 FL — SIGNIFICANT CHANGE UP (ref 80–100)
NRBC # BLD: 0 /100 WBCS — SIGNIFICANT CHANGE UP
NRBC # FLD: 0 K/UL — SIGNIFICANT CHANGE UP
OSMOLALITY SERPL: 311 MOSM/KG — HIGH (ref 275–295)
OSMOLALITY UR: 347 MOSM/KG — SIGNIFICANT CHANGE UP (ref 50–1200)
PLATELET # BLD AUTO: 265 K/UL — SIGNIFICANT CHANGE UP (ref 150–400)
POTASSIUM SERPL-MCNC: 4.6 MMOL/L — SIGNIFICANT CHANGE UP (ref 3.5–5.3)
POTASSIUM SERPL-SCNC: 4.6 MMOL/L — SIGNIFICANT CHANGE UP (ref 3.5–5.3)
PROTHROM AB SERPL-ACNC: 14.7 SEC — HIGH (ref 10.6–13.6)
RBC # BLD: 2.76 M/UL — LOW (ref 3.8–5.2)
RBC # FLD: 16.1 % — HIGH (ref 10.3–14.5)
SODIUM SERPL-SCNC: 134 MMOL/L — LOW (ref 135–145)
SODIUM UR-SCNC: 47 MMOL/L — SIGNIFICANT CHANGE UP
UUN UR-MCNC: 559.7 MG/DL — SIGNIFICANT CHANGE UP
WBC # BLD: 6.74 K/UL — SIGNIFICANT CHANGE UP (ref 3.8–10.5)
WBC # FLD AUTO: 6.74 K/UL — SIGNIFICANT CHANGE UP (ref 3.8–10.5)

## 2021-04-05 PROCEDURE — 99232 SBSQ HOSP IP/OBS MODERATE 35: CPT

## 2021-04-05 RX ORDER — WARFARIN SODIUM 2.5 MG/1
4 TABLET ORAL ONCE
Refills: 0 | Status: COMPLETED | OUTPATIENT
Start: 2021-04-05 | End: 2021-04-05

## 2021-04-05 RX ORDER — WARFARIN SODIUM 2.5 MG/1
5 TABLET ORAL ONCE
Refills: 0 | Status: DISCONTINUED | OUTPATIENT
Start: 2021-04-05 | End: 2021-04-05

## 2021-04-05 RX ORDER — BUMETANIDE 0.25 MG/ML
2 INJECTION INTRAMUSCULAR; INTRAVENOUS DAILY
Refills: 0 | Status: DISCONTINUED | OUTPATIENT
Start: 2021-04-06 | End: 2021-04-14

## 2021-04-05 RX ADMIN — HEPARIN SODIUM 1000 UNIT(S)/HR: 5000 INJECTION INTRAVENOUS; SUBCUTANEOUS at 11:49

## 2021-04-05 RX ADMIN — Medication 3 MILLILITER(S): at 10:04

## 2021-04-05 RX ADMIN — BUDESONIDE AND FORMOTEROL FUMARATE DIHYDRATE 2 PUFF(S): 160; 4.5 AEROSOL RESPIRATORY (INHALATION) at 22:09

## 2021-04-05 RX ADMIN — Medication 40 MILLIGRAM(S): at 06:39

## 2021-04-05 RX ADMIN — PANTOPRAZOLE SODIUM 40 MILLIGRAM(S): 20 TABLET, DELAYED RELEASE ORAL at 06:39

## 2021-04-05 RX ADMIN — Medication 3 MILLILITER(S): at 22:35

## 2021-04-05 RX ADMIN — HEPARIN SODIUM 1000 UNIT(S)/HR: 5000 INJECTION INTRAVENOUS; SUBCUTANEOUS at 18:15

## 2021-04-05 RX ADMIN — WARFARIN SODIUM 4 MILLIGRAM(S): 2.5 TABLET ORAL at 17:18

## 2021-04-05 RX ADMIN — Medication 3 MILLILITER(S): at 15:38

## 2021-04-05 RX ADMIN — Medication 3 MILLILITER(S): at 04:06

## 2021-04-05 RX ADMIN — Medication 100 MILLIGRAM(S): at 13:10

## 2021-04-05 RX ADMIN — SIMVASTATIN 10 MILLIGRAM(S): 20 TABLET, FILM COATED ORAL at 22:09

## 2021-04-05 RX ADMIN — Medication 40 MILLIGRAM(S): at 17:18

## 2021-04-05 NOTE — PROGRESS NOTE ADULT - ASSESSMENT
70y Female with history of obesity, severe MR and TR s/p repair, COPD on home O2 presents with dyspnea. Nephrology consulted for elevated Scr.    1. MISAEL: likely due to CRS. Scr relatively stable. UA bland. Check urine urea and urine creatinine. Check renal US. Avoid nephrotoxins.   2. CKD-3b: Baseline Scr appears to be 1.4. UA bland without proteinuria. Check renal US as above. Further work up pending results. Monitor electrolytes.  3. HTN with CKD: BP low normal for which lisinopril 40 mg daily discontinued. Monitor BP.  4. LE edema: Improving on lasix 40 mg IV twice daily. Will likely transition to oral bumex 2 mg twice daily in AM. Monitor UO.  5. Hyponatremia: In setting of HF. Serum Na acceptable. Monitor serum Na.      [facility] NEPHROLOGY  Rodrigue Amador M.D.  Rob Perez D.O.  Ana Song M.D.  Lucrecia López, MSN, ANP-C    Telephone: [phone]  Facsimile: [phone]    [address] [place] [address]   70y Female with history of obesity, severe MR and TR s/p repair, COPD on home O2 presents with dyspnea. Nephrology consulted for elevated Scr.    1. MISAEL: likely due to CRS. Scr relatively stable. UA bland. Check urine urea and urine creatinine. Check renal US. Avoid nephrotoxins.   2. CKD-3b: Baseline Scr appears to be 1.4. UA bland without proteinuria. Check renal US as above. Further work up pending results. Monitor electrolytes.  3. HTN with CKD: BP low normal for which lisinopril 40 mg daily discontinued. Monitor BP.  4. LE edema: Improving on lasix 40 mg IV twice daily. Will likely transition to oral bumex 2 mg twice daily in AM. F/U TTE. Monitor UO.  5. Hyponatremia: In setting of HF. Serum Na acceptable. Monitor serum Na.      [facility] NEPHROLOGY  Rodrigue Amador M.D.  Rob Perez D.O.  Ana Song M.D.  Lucrecia López, ALLISON, ANP-C    Telephone: [phone]  Facsimile: [phone]    [address] [place] [address]

## 2021-04-05 NOTE — CHART NOTE - NSCHARTNOTEFT_GEN_A_CORE
-no further bradycardic episodes on tele. Continue to hold metoprolol. No further EP work up needed at this time

## 2021-04-06 LAB
ANION GAP SERPL CALC-SCNC: 7 MMOL/L — SIGNIFICANT CHANGE UP (ref 7–14)
APTT BLD: 69.4 SEC — HIGH (ref 27–36.3)
APTT BLD: 73 SEC — HIGH (ref 27–36.3)
BUN SERPL-MCNC: 75 MG/DL — HIGH (ref 7–23)
CALCIUM SERPL-MCNC: 9.4 MG/DL — SIGNIFICANT CHANGE UP (ref 8.4–10.5)
CHLORIDE SERPL-SCNC: 96 MMOL/L — LOW (ref 98–107)
CO2 SERPL-SCNC: 33 MMOL/L — HIGH (ref 22–31)
CREAT SERPL-MCNC: 1.81 MG/DL — HIGH (ref 0.5–1.3)
GLUCOSE SERPL-MCNC: 94 MG/DL — SIGNIFICANT CHANGE UP (ref 70–99)
HCT VFR BLD CALC: 25.7 % — LOW (ref 34.5–45)
HGB BLD-MCNC: 7.3 G/DL — LOW (ref 11.5–15.5)
INR BLD: 1.27 RATIO — HIGH (ref 0.88–1.16)
MAGNESIUM SERPL-MCNC: 2.6 MG/DL — SIGNIFICANT CHANGE UP (ref 1.6–2.6)
MCHC RBC-ENTMCNC: 26.6 PG — LOW (ref 27–34)
MCHC RBC-ENTMCNC: 28.4 GM/DL — LOW (ref 32–36)
MCV RBC AUTO: 93.8 FL — SIGNIFICANT CHANGE UP (ref 80–100)
NRBC # BLD: 0 /100 WBCS — SIGNIFICANT CHANGE UP
NRBC # FLD: 0 K/UL — SIGNIFICANT CHANGE UP
PHOSPHATE SERPL-MCNC: 3.8 MG/DL — SIGNIFICANT CHANGE UP (ref 2.5–4.5)
PLATELET # BLD AUTO: 286 K/UL — SIGNIFICANT CHANGE UP (ref 150–400)
POTASSIUM SERPL-MCNC: 5 MMOL/L — SIGNIFICANT CHANGE UP (ref 3.5–5.3)
POTASSIUM SERPL-SCNC: 5 MMOL/L — SIGNIFICANT CHANGE UP (ref 3.5–5.3)
PROTHROM AB SERPL-ACNC: 14.4 SEC — HIGH (ref 10.6–13.6)
RBC # BLD: 2.74 M/UL — LOW (ref 3.8–5.2)
RBC # FLD: 16.1 % — HIGH (ref 10.3–14.5)
SODIUM SERPL-SCNC: 136 MMOL/L — SIGNIFICANT CHANGE UP (ref 135–145)
WBC # BLD: 6.37 K/UL — SIGNIFICANT CHANGE UP (ref 3.8–10.5)
WBC # FLD AUTO: 6.37 K/UL — SIGNIFICANT CHANGE UP (ref 3.8–10.5)

## 2021-04-06 PROCEDURE — 93306 TTE W/DOPPLER COMPLETE: CPT | Mod: 26

## 2021-04-06 PROCEDURE — 76770 US EXAM ABDO BACK WALL COMP: CPT | Mod: 26

## 2021-04-06 RX ORDER — WARFARIN SODIUM 2.5 MG/1
7 TABLET ORAL ONCE
Refills: 0 | Status: COMPLETED | OUTPATIENT
Start: 2021-04-06 | End: 2021-04-06

## 2021-04-06 RX ADMIN — Medication 3 MILLILITER(S): at 22:36

## 2021-04-06 RX ADMIN — PANTOPRAZOLE SODIUM 40 MILLIGRAM(S): 20 TABLET, DELAYED RELEASE ORAL at 05:15

## 2021-04-06 RX ADMIN — HEPARIN SODIUM 1000 UNIT(S)/HR: 5000 INJECTION INTRAVENOUS; SUBCUTANEOUS at 01:07

## 2021-04-06 RX ADMIN — SIMVASTATIN 10 MILLIGRAM(S): 20 TABLET, FILM COATED ORAL at 22:03

## 2021-04-06 RX ADMIN — BUDESONIDE AND FORMOTEROL FUMARATE DIHYDRATE 2 PUFF(S): 160; 4.5 AEROSOL RESPIRATORY (INHALATION) at 22:04

## 2021-04-06 RX ADMIN — Medication 3 MILLILITER(S): at 10:05

## 2021-04-06 RX ADMIN — Medication 100 MILLIGRAM(S): at 12:00

## 2021-04-06 RX ADMIN — Medication 3 MILLILITER(S): at 15:21

## 2021-04-06 RX ADMIN — HEPARIN SODIUM 1000 UNIT(S)/HR: 5000 INJECTION INTRAVENOUS; SUBCUTANEOUS at 09:46

## 2021-04-06 RX ADMIN — BUMETANIDE 2 MILLIGRAM(S): 0.25 INJECTION INTRAMUSCULAR; INTRAVENOUS at 05:16

## 2021-04-06 RX ADMIN — WARFARIN SODIUM 7 MILLIGRAM(S): 2.5 TABLET ORAL at 17:57

## 2021-04-06 NOTE — PROGRESS NOTE ADULT - ASSESSMENT
70y Female with history of obesity, severe MR and TR s/p repair, COPD on home O2 presents with dyspnea. Nephrology consulted for elevated Scr.    1. MISAEL: likely due to CRS. F/U renal panel this morning. UA bland. FeUrea low. F/U renal US. Avoid nephrotoxins.   2. CKD-3b: Baseline Scr appears to be 1.4. UA bland without proteinuria. F/U renal US as above. Further work up pending results. Monitor electrolytes.  3. HTN with CKD: BP low normal for which lisinopril 40 mg daily discontinued. Monitor BP.  4. LE edema: Improving for which lasix 40 mg IV twice daily changed to bumex 2 mg PO daily. F/U TTE. Monitor UO.  5. Hyponatremia: In setting of HF. Serum Na acceptable. Monitor serum Na.      [facility] NEPHROLOGY  Rodrigue Amador M.D.  Rob Perez D.O.  Ana Song M.D.  Lucrecia López, ALLISON, ANP-C    Telephone: [phone]  Facsimile: [phone]    [address] [place] [id number]

## 2021-04-07 ENCOUNTER — TRANSCRIPTION ENCOUNTER (OUTPATIENT)
Age: 71
End: 2021-04-07

## 2021-04-07 LAB
ANION GAP SERPL CALC-SCNC: 7 MMOL/L — SIGNIFICANT CHANGE UP (ref 7–14)
APTT BLD: 80.3 SEC — HIGH (ref 27–36.3)
BUN SERPL-MCNC: 74 MG/DL — HIGH (ref 7–23)
CALCIUM SERPL-MCNC: 9.5 MG/DL — SIGNIFICANT CHANGE UP (ref 8.4–10.5)
CHLORIDE SERPL-SCNC: 97 MMOL/L — LOW (ref 98–107)
CO2 SERPL-SCNC: 32 MMOL/L — HIGH (ref 22–31)
CREAT SERPL-MCNC: 1.72 MG/DL — HIGH (ref 0.5–1.3)
GLUCOSE SERPL-MCNC: 102 MG/DL — HIGH (ref 70–99)
HCT VFR BLD CALC: 25.4 % — LOW (ref 34.5–45)
HGB BLD-MCNC: 7.2 G/DL — LOW (ref 11.5–15.5)
INR BLD: 1.33 RATIO — HIGH (ref 0.88–1.16)
MAGNESIUM SERPL-MCNC: 2.4 MG/DL — SIGNIFICANT CHANGE UP (ref 1.6–2.6)
MCHC RBC-ENTMCNC: 26.6 PG — LOW (ref 27–34)
MCHC RBC-ENTMCNC: 28.3 GM/DL — LOW (ref 32–36)
MCV RBC AUTO: 93.7 FL — SIGNIFICANT CHANGE UP (ref 80–100)
NRBC # BLD: 0 /100 WBCS — SIGNIFICANT CHANGE UP
NRBC # FLD: 0 K/UL — SIGNIFICANT CHANGE UP
PHOSPHATE SERPL-MCNC: 3.8 MG/DL — SIGNIFICANT CHANGE UP (ref 2.5–4.5)
PLATELET # BLD AUTO: 264 K/UL — SIGNIFICANT CHANGE UP (ref 150–400)
POTASSIUM SERPL-MCNC: 5.1 MMOL/L — SIGNIFICANT CHANGE UP (ref 3.5–5.3)
POTASSIUM SERPL-SCNC: 5.1 MMOL/L — SIGNIFICANT CHANGE UP (ref 3.5–5.3)
PROTHROM AB SERPL-ACNC: 15.1 SEC — HIGH (ref 10.6–13.6)
RBC # BLD: 2.71 M/UL — LOW (ref 3.8–5.2)
RBC # FLD: 16 % — HIGH (ref 10.3–14.5)
SODIUM SERPL-SCNC: 136 MMOL/L — SIGNIFICANT CHANGE UP (ref 135–145)
WBC # BLD: 6.52 K/UL — SIGNIFICANT CHANGE UP (ref 3.8–10.5)
WBC # FLD AUTO: 6.52 K/UL — SIGNIFICANT CHANGE UP (ref 3.8–10.5)

## 2021-04-07 RX ORDER — IRON SUCROSE 20 MG/ML
200 INJECTION, SOLUTION INTRAVENOUS ONCE
Refills: 0 | Status: COMPLETED | OUTPATIENT
Start: 2021-04-07 | End: 2021-04-07

## 2021-04-07 RX ORDER — WARFARIN SODIUM 2.5 MG/1
6 TABLET ORAL ONCE
Refills: 0 | Status: COMPLETED | OUTPATIENT
Start: 2021-04-07 | End: 2021-04-07

## 2021-04-07 RX ORDER — IRON SUCROSE 20 MG/ML
200 INJECTION, SOLUTION INTRAVENOUS
Refills: 0 | Status: DISCONTINUED | OUTPATIENT
Start: 2021-04-08 | End: 2021-04-08

## 2021-04-07 RX ADMIN — IRON SUCROSE 110 MILLIGRAM(S): 20 INJECTION, SOLUTION INTRAVENOUS at 11:27

## 2021-04-07 RX ADMIN — WARFARIN SODIUM 6 MILLIGRAM(S): 2.5 TABLET ORAL at 17:40

## 2021-04-07 RX ADMIN — Medication 3 MILLILITER(S): at 04:48

## 2021-04-07 RX ADMIN — Medication 3 MILLILITER(S): at 09:35

## 2021-04-07 RX ADMIN — BUMETANIDE 2 MILLIGRAM(S): 0.25 INJECTION INTRAMUSCULAR; INTRAVENOUS at 05:06

## 2021-04-07 RX ADMIN — HEPARIN SODIUM 1000 UNIT(S)/HR: 5000 INJECTION INTRAVENOUS; SUBCUTANEOUS at 06:50

## 2021-04-07 RX ADMIN — SIMVASTATIN 10 MILLIGRAM(S): 20 TABLET, FILM COATED ORAL at 21:29

## 2021-04-07 RX ADMIN — PANTOPRAZOLE SODIUM 40 MILLIGRAM(S): 20 TABLET, DELAYED RELEASE ORAL at 05:06

## 2021-04-07 RX ADMIN — Medication 3 MILLILITER(S): at 16:40

## 2021-04-07 RX ADMIN — Medication 100 MILLIGRAM(S): at 11:29

## 2021-04-07 RX ADMIN — BUDESONIDE AND FORMOTEROL FUMARATE DIHYDRATE 2 PUFF(S): 160; 4.5 AEROSOL RESPIRATORY (INHALATION) at 21:29

## 2021-04-07 NOTE — DISCHARGE NOTE PROVIDER - NSDCMRMEDTOKEN_GEN_ALL_CORE_FT
Albuterol (Eqv-ProAir HFA) 90 mcg/inh inhalation aerosol: 2 puff(s) inhaled every 6 hours  allopurinol 100 mg oral tablet: 1 tab(s) orally once a day  budesonide-formoterol 160 mcg-4.5 mcg/inh inhalation aerosol: 2  inhaled 2 times a day  bumetanide 2 mg oral tablet: 1 tab(s) orally once a day  digoxin 125 mcg (0.125 mg) oral tablet: 1 tab(s) orally once a day  Lasix 40 mg oral tablet: 1 tab(s) orally once a day, As Needed  metoprolol succinate 25 mg oral tablet, extended release: 1 tab(s) orally once a day  ramipril 2.5 mg oral capsule: 1 cap(s) orally once a day   simvastatin 10 mg oral tablet: 1 tab(s) orally once a day (at bedtime)  sodium chloride 0.65% nasal spray: 2 spray(s) nasal 4 times a day   warfarin 2.5 mg oral tablet: 1 tab(s) orally once a day    Albuterol (Eqv-ProAir HFA) 90 mcg/inh inhalation aerosol: 2 puff(s) inhaled every 6 hours  allopurinol 100 mg oral tablet: 1 tab(s) orally once a day  budesonide-formoterol 160 mcg-4.5 mcg/inh inhalation aerosol: 2  inhaled 2 times a day  bumetanide 1 mg oral tablet: 1 tab(s) orally every 12 hours  pantoprazole 40 mg oral delayed release tablet: 1 tab(s) orally once a day (before a meal)  simvastatin 10 mg oral tablet: 1 tab(s) orally once a day (at bedtime)  sodium chloride 0.65% nasal spray: 2 spray(s) nasal 4 times a day   warfarin 7.5 mg oral tablet: 1 tab(s) orally once a day

## 2021-04-07 NOTE — DISCHARGE NOTE PROVIDER - NSDCCPCAREPLAN_GEN_ALL_CORE_FT
PRINCIPAL DISCHARGE DIAGNOSIS  Diagnosis: Heart failure  Assessment and Plan of Treatment: You were treated for a heart failure exacerbation with IV diuretic (water pill). Nephrology and cardiology have switched your water pill to Bumex. Please take as prescribed. Follow up with your cardiologist within one week of discharge.      SECONDARY DISCHARGE DIAGNOSES  Diagnosis: Stage 3 chronic kidney disease, unspecified whether stage 3a or 3b CKD  Assessment and Plan of Treatment: Your kidney function was found to be elevated. You were followed by nephrology. Your creatinine (kidney function) has now improved. Stop ramipril for now and follow up with your PCP when to resume this medication.    Diagnosis: Anemia  Assessment and Plan of Treatment: You were found to have anemia and received a blood transfusion. You were found to have blood in your stools. Gastroenterology evaluated you and believes there is a high risk for endoscopy at this time. If you notice blood in your stools or dark stools that persist, please contact your physician or return to the hospital. You can follow up with gastroenterology as an outpatient. Take pantoprazole for GI protection.    Diagnosis: Mitral valve replaced  Assessment and Plan of Treatment: You have a history of a prosthetic mitral valve for which you are on coumadin. Your INR levels were low and you were bridged to an appropriate INR with a heparin drip. Your coumadin dose was changed- TAKE COUMADIN 7.5mg once daily and follow up with your PCP within one week for INR level check.    Diagnosis: Renal lesion  Assessment and Plan of Treatment: Your kidney ultrasound and CT scan of the abdomen and pelvis showed a solid renal lesion on your left kidney. This needs to be further evaluated with an MRI. You are claustrophobic and would like to have an open MRI as an outpatient. Follow up with urology.    Diagnosis: Atrial fibrillation  Assessment and Plan of Treatment: Continue with your warfarin as prescribed. Your metoprolol was stopped due to episodes of bradycardia (slow heart rate) on the cardiac monitor. Follow up with your cardiologist when to resume this medication. STOP digoxin. You do not require this medication.

## 2021-04-07 NOTE — PROGRESS NOTE ADULT - ASSESSMENT
70y Female with history of obesity, severe MR and TR s/p repair, COPD on home O2 presents with dyspnea. Nephrology consulted for elevated Scr.    1. MISAEL: likely due to CRS. Scr improving. UA bland. FeUrea low. Renal US with L renal solid lesion for which would recommend urology evaluation r/o RCC. Avoid nephrotoxins.   2. CKD-3b: Baseline Scr appears to be 1.4. UA bland without proteinuria. Outpatient CKD work up. Monitor electrolytes.  3. HTN with CKD: BP low normal for which lisinopril 40 mg daily discontinued. Monitor BP.  4. LE edema: Improving. Continue with bumex 2 mg PO daily. TTE unable to visualize LV. Monitor UO.  5. Hyponatremia: In setting of HF. Serum Na acceptable. Monitor serum Na.       [facility] NEPHROLOGY  Rodrigue Amador M.D.  LUIS ALBERTO EnglishO.  Ana Song M.D.  Lucrecia López, MSN, ANP-C    Telephone: [phone]  Facsimile: [phone]    [address] [place] [address]   70y Female with history of obesity, severe MR and TR s/p repair, COPD on home O2 presents with dyspnea. Nephrology consulted for elevated Scr.    1. MISAEL: likely due to CRS. Scr improving. UA bland. FeUrea low. Renal US with L renal solid lesion for which would recommend urology evaluation r/o RCC. Avoid nephrotoxins.   2. CKD-3b: Baseline Scr appears to be 1.4. UA bland without proteinuria. Outpatient CKD work up. Monitor electrolytes.  3. HTN with CKD: BP low normal for which lisinopril 40 mg daily discontinued. Monitor BP.  4. LE edema: Improving. Continue with bumex 2 mg PO daily. TTE unable to visualize LV. Monitor UO.  5. Hyponatremia: In setting of HF. Serum Na acceptable. Monitor serum Na.   6. Anemia: With iron deficiency. Will give venofer 200 mg IV dose 1/3 today. Monitor Hb.      [facility] NEPHROLOGY  Rodrigue Amador M.D.  Rob Perez D.O.  Ana Song M.D.  Lucrecia López, ALLISON, ANP-C    Telephone: [phone]  Facsimile: [phone]    [address] [place] [address]

## 2021-04-07 NOTE — CHART NOTE - NSCHARTNOTEFT_GEN_A_CORE
Discussed RENAL US findings with patient. Explained need for MRI with lukas given CT with IV contrast not feasible due to kidney function. Patient states she cannot lay flat and she is extremely claustrophobic. She is unwilling to try MRI here. She prefers outpatient standing or open MRI. Of note, patient has two mechanical valves, one done in 1999 at [facility] and another in 2008 at [facility] .

## 2021-04-07 NOTE — CONSULT NOTE ADULT - ASSESSMENT
70 year old woman admitted for acute on chronic HF found to have incidental left renal mass on renal US.    -recommend contrast enhanced cross sectional imaging (CT vs MRI) with renal mass protocol to better evaluate renal mass  -outpatient follow up for further management/evaluation      [facility]  [address] [place] [phone]        To be discussed with attending on call

## 2021-04-07 NOTE — DISCHARGE NOTE PROVIDER - NSDCFUADDAPPT_GEN_ALL_CORE_FT
Follow up with urology within 1-2 weeks of discharge at [facility]  [address] [place] [address], [place] [address], [phone]     Follow up with urology within 1-2 weeks of discharge at [facility]  [address] [place] [address], [place] [address], [phone]    If you are in need of a general medicine physician and post-discharge medical follow-up for further care/recommendations you may contact the [facility] for an appointment [phone]

## 2021-04-07 NOTE — DISCHARGE NOTE PROVIDER - CARE PROVIDER_API CALL
Evan Galvan (DO)  Gastroenterology; Internal Medicine  [address] [place] [address]  Phone: [phone]  Fax: [phone]  Follow Up Time:

## 2021-04-07 NOTE — DISCHARGE NOTE PROVIDER - HOSPITAL COURSE
71 y/o F with PMHx of morbid obesity, CAD,  HFpEF (EF 65-60%), severe MR and TR s/p mechanical MV and TV repair (on coumadin), Afib s/p failed ablation, HTN, HLD, HTN, MIGUEL, COPD (on 3L O2), CKD3, gout, anemia (on iron pills) who presents with worsening dyspnea likely in the setting of acute on chronic heart failure exacerbation. Patient was IV diuresed until euvolemic. TTE poor quality. Patient noted to have subtherapeutic INR on admission with history of a mechanical valve. Completed heparin to coumadin bridge. Patient noted to be anemic with positive stool occult. S/p PRBC with Hgb stable. Per GI Dr. Galvan, conservative management recommeded at this time as patient is high risk for endoscopic evaluation. Noted to have MISAEL. Renal US and CTAP with L renal solid lesion. Urology consulted- patient       1. MISAEL: likely due to CRS. Scr stable and mildly above baseline. UA bland. FeUrea low. Renal US and CT with L renal solid lesion with urology following for which patient will likely need outpatient open MRI. Avoid nephrotoxins.   2. CKD-3b: Baseline Scr appears to be 1.4. UA bland without proteinuria. Outpatient CKD work up. Monitor electrolytes.  3. HTN with CKD: BP controlled with negative orthostatics. Holding lisinopril. Monitor BP.  4. LE edema: Improving on bumex 2 mg PO twice daily (1.9L UO). Decrease to 1 mg twice daily given azotemia and improving volume status. TTE unable to visualize LV. Monitor UO.  5. Hyponatremia: In setting of HF now resolved with diuresis. Monitor serum Na.   6. Anemia: With iron deficiency and patient occult positive s/p venofer 200 mg x 3 doses and Epo on 4/10. Will give another dose today. Monitor Hb. 69 y/o F with PMHx of morbid obesity, CAD,  HFpEF (EF 65-60%), severe MR and TR s/p mechanical MV and TV repair (on coumadin), Afib s/p failed ablation, HTN, HLD, HTN, MIGUEL, COPD (on 3L O2), CKD3, gout, anemia (on iron pills) who presents with worsening dyspnea likely in the setting of acute on chronic heart failure exacerbation. Patient was IV diuresed until euvolemic. TTE poor quality. Patient noted to have subtherapeutic INR on admission with history of a mechanical valve. Completed heparin to coumadin bridge. Patient noted to be anemic with positive stool occult. S/p PRBC with Hgb stable. Per GI Dr. Galvan, conservative management recommeded at this time as patient is high risk for endoscopic evaluation. Noted to have MISAEL which is now resolving. Renal US and CTAP with L renal solid lesion. Urology consulted and recommends further evaluation with MRI. Patient is claustrophobic and is not willing to try MRI inpatient and will follow up for open MRI.      69 y/o F with PMHx of morbid obesity, CAD,  HFpEF (EF 65-60%), severe MR and TR s/p mechanical MV and TV repair (on coumadin), Afib s/p failed ablation, HTN, HLD, HTN, MIGUEL, COPD (on 3L O2), CKD3, gout, anemia (on iron pills) who presents with worsening dyspnea likely in the setting of acute on chronic heart failure exacerbation. Patient was IV diuresed until euvolemic. TTE poor quality. Patient noted to have subtherapeutic INR on admission with history of a mechanical valve. Completed heparin to coumadin bridge. Patient noted to be anemic with positive stool occult. S/p PRBC with Hgb stable. Per GI Dr. Galvan, conservative management recommeded at this time as patient is high risk for endoscopic evaluation. Noted to have MISAEL which is now resolving. Renal US and CTAP with L renal solid lesion. Urology consulted and recommends further evaluation with MRI. Patient is claustrophobic and is not willing to try MRI inpatient and will follow up for open MRI.     Patient seen and evaluated. Reviewed discharge medications with patient and attending. All new medications requiring new prescriptions were sent to the pharmacy of patient's choice. Reviewed need for prescription for previous home medications and new prescriptions sent if requested. Medically cleared/stable for discharge as per Dr. Merida on 4/16/2021 with appropriate follow up. Patient understands and agrees with plan of care.

## 2021-04-07 NOTE — CONSULT NOTE ADULT - CONSULT REQUESTED DATE/TIME
07-Apr-2021 12:13
01-Apr-2021 13:20
04-Apr-2021 23:10
04-Apr-2021 15:33
01-Apr-2021 09:37
02-Apr-2021 05:07

## 2021-04-07 NOTE — CONSULT NOTE ADULT - SUBJECTIVE AND OBJECTIVE BOX
HPI  70 y.o. F with PMH of morbid obesity, CAD,  HFpEF (EF 65-60%), severe MR and TR s/p mechanical MV and TV repair (on coumadin), Afib s/p failed ablation, HTN, HLD, HTN, MIGUEL, COPD (on 3L O2), CKD3, gout, anemia admitted for acute on chronic HF exacerbation. Urology consulted for incidental finding of 3.5x3x3.2cm left upper pole renal mass discovered on renal US in evaluation of MISAEL.      PAST MEDICAL & SURGICAL HISTORY:  Atrial fibrillation  S/P Ablation    Pulmonary hypertension    MIGUEL (obstructive sleep apnea)    COPD (chronic obstructive pulmonary disease)  on home O2    CHF (congestive heart failure)    HTN (hypertension)    Gout    Mitral valve replaced    Tricuspid valve replaced    CHF (congestive heart failure)    Hypertension    COPD (chronic obstructive pulmonary disease)    History of mitral valve replacement with mechanical valve    Tricuspid valve replaced  mechanical    No significant past surgical history        MEDICATIONS  (STANDING):  albuterol/ipratropium for Nebulization 3 milliLiter(s) Nebulizer every 6 hours  allopurinol 100 milliGRAM(s) Oral daily  budesonide 160 MICROgram(s)/formoterol 4.5 MICROgram(s) Inhaler 2 Puff(s) Inhalation two times a day  buMETAnide 2 milliGRAM(s) Oral daily  heparin  Infusion. 1300 Unit(s)/Hr (13 mL/Hr) IV Continuous <Continuous>  pantoprazole    Tablet 40 milliGRAM(s) Oral before breakfast  simvastatin 10 milliGRAM(s) Oral at bedtime  warfarin 6 milliGRAM(s) Oral once    MEDICATIONS  (PRN):  aluminum hydroxide/magnesium hydroxide/simethicone Suspension 30 milliLiter(s) Oral every 6 hours PRN Dyspepsia  heparin   Injectable 4000 Unit(s) IV Push every 6 hours PRN For aPTT between 40 - 57  heparin   Injectable 8500 Unit(s) IV Push every 6 hours PRN For aPTT less than 40  sodium chloride 0.65% Nasal 2 Spray(s) Both Nostrils four times a day PRN Nasal Congestion      FAMILY HISTORY:  Family history of hypertension (Father, Sibling)    Family history of stroke    Family history of hypertension (Mother)        Allergies    No Known Allergies    Intolerances        SOCIAL HISTORY:    REVIEW OF SYSTEMS: Otherwise negative as stated in HPI    Physical Exam  Vital signs  T(C): 36.5 (04-07-21 @ 10:15), Max: 36.7 (04-06-21 @ 19:30)  HR: 71 (04-07-21 @ 10:15)  BP: 116/55 (04-07-21 @ 10:15)  SpO2: 100% (04-07-21 @ 10:15)  Wt(kg): --    Output    OUT:    Voided (mL): 1700 mL  Total OUT: 1700 mL    Total NET: -1700 mL          Gen: NAD  GI: S/ND/NT  : no CVAT BL, no suprapubic TTP  MSK: moves all 4 limbs spontaneously      LABS:      04-07 @ 06:27    WBC 6.52  / Hct 25.4  / SCr 1.72     04-06 @ 08:12    WBC 6.37  / Hct 25.7  / SCr 1.81     04-07    136  |  97<L>  |  74<H>  ----------------------------<  102<H>  5.1   |  32<H>  |  1.72<H>    Ca    9.5      07 Apr 2021 06:27  Phos  3.8     04-07  Mg     2.4     04-07      PT/INR - ( 07 Apr 2021 06:27 )   PT: 15.1 sec;   INR: 1.33 ratio         PTT - ( 07 Apr 2021 06:27 )  PTT:80.3 sec          RADIOLOGY:  < from: US Kidney and Bladder (04.06.21 @ 07:46) >  EXAM:  US KIDNEYS AND BLADDER        PROCEDURE DATE:  Apr 6 2021       INTERPRETATION:  CLINICAL INFORMATION: Acute kidney injury.    COMPARISON: None available.    TECHNIQUE: Sonography of the kidneys and bladder.    FINDINGS:    Right kidney: 10.1 cm. No renal mass, hydronephrosis or calculi.    Left kidney: 10.9 cm. No hydronephrosis or calculi. Solid upper pole lesion measures 3.5 x 3.0 x 3.2 cm.    Urinary bladder: Not distended at the time of exam.    IMPRESSION:    *  No hydronephrosis.  *  Solid lesion in the upper pole of the left kidney measuring up to 3.5 cm, for which further evaluation with CT scan or MRI with and without contrast is recommended.    LESLEY CALLEJAS MD; Attending Radiologist  This document has been electronically signed. Apr 6 2021  8:56AM    < end of copied text >     HPI  70 y.o. F with PMH of morbid obesity, CAD,  HFpEF (EF 65-60%), severe MR and TR s/p mechanical MV and TV repair (on coumadin), Afib s/p failed ablation, HTN, HLD, HTN, MIGUEL, COPD (on 3L O2), CKD3, gout, anemia admitted for acute on chronic HF exacerbation. Urology consulted for incidental finding of 3.5x3x3.2cm left upper pole renal mass discovered on renal US in evaluation of MISAEL.    Denies urinary urgency, frequency, dysuria, hematuria, bladder spasm, abdominal pain, flank pain.  No personal or family history of  malignancy.  Never smoker.      PAST MEDICAL & SURGICAL HISTORY:  Atrial fibrillation  S/P Ablation    Pulmonary hypertension    MIGUEL (obstructive sleep apnea)    COPD (chronic obstructive pulmonary disease)  on home O2    CHF (congestive heart failure)    HTN (hypertension)    Gout    Mitral valve replaced    Tricuspid valve replaced    CHF (congestive heart failure)    Hypertension    COPD (chronic obstructive pulmonary disease)    History of mitral valve replacement with mechanical valve    Tricuspid valve replaced  mechanical    No significant past surgical history        MEDICATIONS  (STANDING):  albuterol/ipratropium for Nebulization 3 milliLiter(s) Nebulizer every 6 hours  allopurinol 100 milliGRAM(s) Oral daily  budesonide 160 MICROgram(s)/formoterol 4.5 MICROgram(s) Inhaler 2 Puff(s) Inhalation two times a day  buMETAnide 2 milliGRAM(s) Oral daily  heparin  Infusion. 1300 Unit(s)/Hr (13 mL/Hr) IV Continuous <Continuous>  pantoprazole    Tablet 40 milliGRAM(s) Oral before breakfast  simvastatin 10 milliGRAM(s) Oral at bedtime  warfarin 6 milliGRAM(s) Oral once    MEDICATIONS  (PRN):  aluminum hydroxide/magnesium hydroxide/simethicone Suspension 30 milliLiter(s) Oral every 6 hours PRN Dyspepsia  heparin   Injectable 4000 Unit(s) IV Push every 6 hours PRN For aPTT between 40 - 57  heparin   Injectable 8500 Unit(s) IV Push every 6 hours PRN For aPTT less than 40  sodium chloride 0.65% Nasal 2 Spray(s) Both Nostrils four times a day PRN Nasal Congestion      FAMILY HISTORY:  Family history of hypertension (Father, Sibling)    Family history of stroke    Family history of hypertension (Mother)        Allergies    No Known Allergies    Intolerances        SOCIAL HISTORY:    REVIEW OF SYSTEMS: Otherwise negative as stated in HPI    Physical Exam  Vital signs  T(C): 36.5 (04-07-21 @ 10:15), Max: 36.7 (04-06-21 @ 19:30)  HR: 71 (04-07-21 @ 10:15)  BP: 116/55 (04-07-21 @ 10:15)  SpO2: 100% (04-07-21 @ 10:15)  Wt(kg): --    Output    OUT:    Voided (mL): 1700 mL  Total OUT: 1700 mL    Total NET: -1700 mL          Gen: NAD  GI: S/ND/NT  : no CVAT BL, no suprapubic TTP  MSK: moves all 4 limbs spontaneously      LABS:      04-07 @ 06:27    WBC 6.52  / Hct 25.4  / SCr 1.72     04-06 @ 08:12    WBC 6.37  / Hct 25.7  / SCr 1.81     04-07    136  |  97<L>  |  74<H>  ----------------------------<  102<H>  5.1   |  32<H>  |  1.72<H>    Ca    9.5      07 Apr 2021 06:27  Phos  3.8     04-07  Mg     2.4     04-07      PT/INR - ( 07 Apr 2021 06:27 )   PT: 15.1 sec;   INR: 1.33 ratio         PTT - ( 07 Apr 2021 06:27 )  PTT:80.3 sec          RADIOLOGY:  < from: US Kidney and Bladder (04.06.21 @ 07:46) >  EXAM:  US KIDNEYS AND BLADDER        PROCEDURE DATE:  Apr 6 2021       INTERPRETATION:  CLINICAL INFORMATION: Acute kidney injury.    COMPARISON: None available.    TECHNIQUE: Sonography of the kidneys and bladder.    FINDINGS:    Right kidney: 10.1 cm. No renal mass, hydronephrosis or calculi.    Left kidney: 10.9 cm. No hydronephrosis or calculi. Solid upper pole lesion measures 3.5 x 3.0 x 3.2 cm.    Urinary bladder: Not distended at the time of exam.    IMPRESSION:    *  No hydronephrosis.  *  Solid lesion in the upper pole of the left kidney measuring up to 3.5 cm, for which further evaluation with CT scan or MRI with and without contrast is recommended.    LESLEY CALLEJAS MD; Attending Radiologist  This document has been electronically signed. Apr 6 2021  8:56AM    < end of copied text >

## 2021-04-08 LAB
ANION GAP SERPL CALC-SCNC: 7 MMOL/L — SIGNIFICANT CHANGE UP (ref 7–14)
APTT BLD: 87.1 SEC — HIGH (ref 27–36.3)
BASOPHILS # BLD AUTO: 0.02 K/UL — SIGNIFICANT CHANGE UP (ref 0–0.2)
BASOPHILS NFR BLD AUTO: 0.3 % — SIGNIFICANT CHANGE UP (ref 0–2)
BLD GP AB SCN SERPL QL: NEGATIVE — SIGNIFICANT CHANGE UP
BUN SERPL-MCNC: 73 MG/DL — HIGH (ref 7–23)
CALCIUM SERPL-MCNC: 9.4 MG/DL — SIGNIFICANT CHANGE UP (ref 8.4–10.5)
CHLORIDE SERPL-SCNC: 95 MMOL/L — LOW (ref 98–107)
CO2 SERPL-SCNC: 32 MMOL/L — HIGH (ref 22–31)
CREAT SERPL-MCNC: 1.71 MG/DL — HIGH (ref 0.5–1.3)
EOSINOPHIL # BLD AUTO: 0.27 K/UL — SIGNIFICANT CHANGE UP (ref 0–0.5)
EOSINOPHIL NFR BLD AUTO: 4.6 % — SIGNIFICANT CHANGE UP (ref 0–6)
GLUCOSE SERPL-MCNC: 123 MG/DL — HIGH (ref 70–99)
HCT VFR BLD CALC: 24.3 % — LOW (ref 34.5–45)
HCT VFR BLD CALC: 26.9 % — LOW (ref 34.5–45)
HGB BLD-MCNC: 7 G/DL — CRITICAL LOW (ref 11.5–15.5)
HGB BLD-MCNC: 8 G/DL — LOW (ref 11.5–15.5)
IANC: 3.55 K/UL — SIGNIFICANT CHANGE UP (ref 1.5–8.5)
IMM GRANULOCYTES NFR BLD AUTO: 0.7 % — SIGNIFICANT CHANGE UP (ref 0–1.5)
INR BLD: 1.4 RATIO — HIGH (ref 0.88–1.16)
LYMPHOCYTES # BLD AUTO: 1.49 K/UL — SIGNIFICANT CHANGE UP (ref 1–3.3)
LYMPHOCYTES # BLD AUTO: 25.1 % — SIGNIFICANT CHANGE UP (ref 13–44)
MAGNESIUM SERPL-MCNC: 2.3 MG/DL — SIGNIFICANT CHANGE UP (ref 1.6–2.6)
MCHC RBC-ENTMCNC: 27.2 PG — SIGNIFICANT CHANGE UP (ref 27–34)
MCHC RBC-ENTMCNC: 27.6 PG — SIGNIFICANT CHANGE UP (ref 27–34)
MCHC RBC-ENTMCNC: 28.8 GM/DL — LOW (ref 32–36)
MCHC RBC-ENTMCNC: 29.7 GM/DL — LOW (ref 32–36)
MCV RBC AUTO: 92.8 FL — SIGNIFICANT CHANGE UP (ref 80–100)
MCV RBC AUTO: 94.6 FL — SIGNIFICANT CHANGE UP (ref 80–100)
MONOCYTES # BLD AUTO: 0.56 K/UL — SIGNIFICANT CHANGE UP (ref 0–0.9)
MONOCYTES NFR BLD AUTO: 9.4 % — SIGNIFICANT CHANGE UP (ref 2–14)
NEUTROPHILS # BLD AUTO: 3.55 K/UL — SIGNIFICANT CHANGE UP (ref 1.8–7.4)
NEUTROPHILS NFR BLD AUTO: 59.9 % — SIGNIFICANT CHANGE UP (ref 43–77)
NRBC # BLD: 0 /100 WBCS — SIGNIFICANT CHANGE UP
NRBC # BLD: 0 /100 WBCS — SIGNIFICANT CHANGE UP
NRBC # FLD: 0 K/UL — SIGNIFICANT CHANGE UP
NRBC # FLD: 0 K/UL — SIGNIFICANT CHANGE UP
PHOSPHATE SERPL-MCNC: 4.1 MG/DL — SIGNIFICANT CHANGE UP (ref 2.5–4.5)
PLATELET # BLD AUTO: 277 K/UL — SIGNIFICANT CHANGE UP (ref 150–400)
PLATELET # BLD AUTO: 285 K/UL — SIGNIFICANT CHANGE UP (ref 150–400)
POTASSIUM SERPL-MCNC: 5 MMOL/L — SIGNIFICANT CHANGE UP (ref 3.5–5.3)
POTASSIUM SERPL-SCNC: 5 MMOL/L — SIGNIFICANT CHANGE UP (ref 3.5–5.3)
PROTHROM AB SERPL-ACNC: 15.8 SEC — HIGH (ref 10.6–13.6)
RBC # BLD: 2.57 M/UL — LOW (ref 3.8–5.2)
RBC # BLD: 2.9 M/UL — LOW (ref 3.8–5.2)
RBC # FLD: 16.1 % — HIGH (ref 10.3–14.5)
RBC # FLD: 16.1 % — HIGH (ref 10.3–14.5)
RH IG SCN BLD-IMP: POSITIVE — SIGNIFICANT CHANGE UP
SODIUM SERPL-SCNC: 134 MMOL/L — LOW (ref 135–145)
WBC # BLD: 5.93 K/UL — SIGNIFICANT CHANGE UP (ref 3.8–10.5)
WBC # BLD: 6.61 K/UL — SIGNIFICANT CHANGE UP (ref 3.8–10.5)
WBC # FLD AUTO: 5.93 K/UL — SIGNIFICANT CHANGE UP (ref 3.8–10.5)
WBC # FLD AUTO: 6.61 K/UL — SIGNIFICANT CHANGE UP (ref 3.8–10.5)

## 2021-04-08 PROCEDURE — 99232 SBSQ HOSP IP/OBS MODERATE 35: CPT

## 2021-04-08 RX ORDER — WARFARIN SODIUM 2.5 MG/1
6 TABLET ORAL ONCE
Refills: 0 | Status: COMPLETED | OUTPATIENT
Start: 2021-04-08 | End: 2021-04-08

## 2021-04-08 RX ADMIN — Medication 100 MILLIGRAM(S): at 14:11

## 2021-04-08 RX ADMIN — SIMVASTATIN 10 MILLIGRAM(S): 20 TABLET, FILM COATED ORAL at 22:40

## 2021-04-08 RX ADMIN — Medication 3 MILLILITER(S): at 15:38

## 2021-04-08 RX ADMIN — PANTOPRAZOLE SODIUM 40 MILLIGRAM(S): 20 TABLET, DELAYED RELEASE ORAL at 05:47

## 2021-04-08 RX ADMIN — WARFARIN SODIUM 6 MILLIGRAM(S): 2.5 TABLET ORAL at 19:02

## 2021-04-08 RX ADMIN — Medication 3 MILLILITER(S): at 21:10

## 2021-04-08 RX ADMIN — Medication 3 MILLILITER(S): at 03:16

## 2021-04-08 RX ADMIN — IRON SUCROSE 220 MILLIGRAM(S): 20 INJECTION, SOLUTION INTRAVENOUS at 19:00

## 2021-04-08 RX ADMIN — HEPARIN SODIUM 1000 UNIT(S)/HR: 5000 INJECTION INTRAVENOUS; SUBCUTANEOUS at 06:52

## 2021-04-08 RX ADMIN — BUMETANIDE 2 MILLIGRAM(S): 0.25 INJECTION INTRAMUSCULAR; INTRAVENOUS at 05:47

## 2021-04-08 RX ADMIN — Medication 3 MILLILITER(S): at 10:18

## 2021-04-08 RX ADMIN — BUDESONIDE AND FORMOTEROL FUMARATE DIHYDRATE 2 PUFF(S): 160; 4.5 AEROSOL RESPIRATORY (INHALATION) at 19:00

## 2021-04-08 NOTE — PROGRESS NOTE ADULT - ASSESSMENT
70y Female with history of obesity, severe MR and TR s/p repair, COPD on home O2 presents with dyspnea. Nephrology consulted for elevated Scr.    1. MISAEL: likely due to CRS. Scr improved. UA bland. FeUrea low. Renal US with L renal solid lesion for which urology following. Avoid nephrotoxins.   2. CKD-3b: Baseline Scr appears to be 1.4. UA bland without proteinuria. Outpatient CKD work up. Monitor electrolytes.  3. HTN with CKD: BP low normal for which lisinopril 40 mg daily discontinued. Check orthostatics given complaints of dizziness. Monitor BP.  4. LE edema: Improving. Continue with bumex 2 mg PO daily (patient with 1.5L UO). TTE unable to visualize LV. Monitor UO.  5. Hyponatremia: In setting of HF. Serum Na acceptable. Monitor serum Na.   6. Anemia: With iron deficiency. Continue with venofer 200 mg IV daily X 3 days. Monitor Hb.      [facility] NEPHROLOGY  Rodrigue Amador M.D.  Rob Perez D.O.  Ana Song M.D.  Lucrecia López, MSN, ANP-C    Telephone: [phone]  Facsimile: [phone]    [address] [place] [address]

## 2021-04-09 LAB
ANION GAP SERPL CALC-SCNC: 7 MMOL/L — SIGNIFICANT CHANGE UP (ref 7–14)
APTT BLD: 97.5 SEC — HIGH (ref 27–36.3)
APTT BLD: >200 SEC — SIGNIFICANT CHANGE UP (ref 27–36.3)
BASOPHILS # BLD AUTO: 0.04 K/UL — SIGNIFICANT CHANGE UP (ref 0–0.2)
BASOPHILS NFR BLD AUTO: 0.6 % — SIGNIFICANT CHANGE UP (ref 0–2)
BUN SERPL-MCNC: 70 MG/DL — HIGH (ref 7–23)
CALCIUM SERPL-MCNC: 9.8 MG/DL — SIGNIFICANT CHANGE UP (ref 8.4–10.5)
CHLORIDE SERPL-SCNC: 94 MMOL/L — LOW (ref 98–107)
CO2 SERPL-SCNC: 33 MMOL/L — HIGH (ref 22–31)
CREAT SERPL-MCNC: 1.7 MG/DL — HIGH (ref 0.5–1.3)
EOSINOPHIL # BLD AUTO: 0.26 K/UL — SIGNIFICANT CHANGE UP (ref 0–0.5)
EOSINOPHIL NFR BLD AUTO: 3.9 % — SIGNIFICANT CHANGE UP (ref 0–6)
GLUCOSE SERPL-MCNC: 170 MG/DL — HIGH (ref 70–99)
HCT VFR BLD CALC: 25.8 % — LOW (ref 34.5–45)
HGB BLD-MCNC: 7.6 G/DL — LOW (ref 11.5–15.5)
IANC: 4.06 K/UL — SIGNIFICANT CHANGE UP (ref 1.5–8.5)
IMM GRANULOCYTES NFR BLD AUTO: 1.2 % — SIGNIFICANT CHANGE UP (ref 0–1.5)
INR BLD: 1.47 RATIO — HIGH (ref 0.88–1.16)
LYMPHOCYTES # BLD AUTO: 1.52 K/UL — SIGNIFICANT CHANGE UP (ref 1–3.3)
LYMPHOCYTES # BLD AUTO: 23 % — SIGNIFICANT CHANGE UP (ref 13–44)
MAGNESIUM SERPL-MCNC: 2.2 MG/DL — SIGNIFICANT CHANGE UP (ref 1.6–2.6)
MCHC RBC-ENTMCNC: 27.6 PG — SIGNIFICANT CHANGE UP (ref 27–34)
MCHC RBC-ENTMCNC: 29.5 GM/DL — LOW (ref 32–36)
MCV RBC AUTO: 93.8 FL — SIGNIFICANT CHANGE UP (ref 80–100)
MONOCYTES # BLD AUTO: 0.65 K/UL — SIGNIFICANT CHANGE UP (ref 0–0.9)
MONOCYTES NFR BLD AUTO: 9.8 % — SIGNIFICANT CHANGE UP (ref 2–14)
NEUTROPHILS # BLD AUTO: 4.06 K/UL — SIGNIFICANT CHANGE UP (ref 1.8–7.4)
NEUTROPHILS NFR BLD AUTO: 61.5 % — SIGNIFICANT CHANGE UP (ref 43–77)
NRBC # BLD: 0 /100 WBCS — SIGNIFICANT CHANGE UP
NRBC # FLD: 0 K/UL — SIGNIFICANT CHANGE UP
PHOSPHATE SERPL-MCNC: 3.6 MG/DL — SIGNIFICANT CHANGE UP (ref 2.5–4.5)
PLATELET # BLD AUTO: 278 K/UL — SIGNIFICANT CHANGE UP (ref 150–400)
POTASSIUM SERPL-MCNC: 5.1 MMOL/L — SIGNIFICANT CHANGE UP (ref 3.5–5.3)
POTASSIUM SERPL-SCNC: 5.1 MMOL/L — SIGNIFICANT CHANGE UP (ref 3.5–5.3)
PROTHROM AB SERPL-ACNC: 16.5 SEC — HIGH (ref 10.6–13.6)
RBC # BLD: 2.75 M/UL — LOW (ref 3.8–5.2)
RBC # FLD: 16.2 % — HIGH (ref 10.3–14.5)
SODIUM SERPL-SCNC: 134 MMOL/L — LOW (ref 135–145)
WBC # BLD: 6.61 K/UL — SIGNIFICANT CHANGE UP (ref 3.8–10.5)
WBC # FLD AUTO: 6.61 K/UL — SIGNIFICANT CHANGE UP (ref 3.8–10.5)

## 2021-04-09 PROCEDURE — 99232 SBSQ HOSP IP/OBS MODERATE 35: CPT

## 2021-04-09 RX ORDER — HEPARIN SODIUM 5000 [USP'U]/ML
4000 INJECTION INTRAVENOUS; SUBCUTANEOUS EVERY 6 HOURS
Refills: 0 | Status: DISCONTINUED | OUTPATIENT
Start: 2021-04-09 | End: 2021-04-15

## 2021-04-09 RX ORDER — HEPARIN SODIUM 5000 [USP'U]/ML
8500 INJECTION INTRAVENOUS; SUBCUTANEOUS EVERY 6 HOURS
Refills: 0 | Status: DISCONTINUED | OUTPATIENT
Start: 2021-04-09 | End: 2021-04-15

## 2021-04-09 RX ORDER — WARFARIN SODIUM 2.5 MG/1
6 TABLET ORAL ONCE
Refills: 0 | Status: COMPLETED | OUTPATIENT
Start: 2021-04-09 | End: 2021-04-09

## 2021-04-09 RX ORDER — HEPARIN SODIUM 5000 [USP'U]/ML
1000 INJECTION INTRAVENOUS; SUBCUTANEOUS
Qty: 25000 | Refills: 0 | Status: DISCONTINUED | OUTPATIENT
Start: 2021-04-09 | End: 2021-04-15

## 2021-04-09 RX ORDER — IRON SUCROSE 20 MG/ML
200 INJECTION, SOLUTION INTRAVENOUS ONCE
Refills: 0 | Status: COMPLETED | OUTPATIENT
Start: 2021-04-09 | End: 2021-04-09

## 2021-04-09 RX ADMIN — IRON SUCROSE 110 MILLIGRAM(S): 20 INJECTION, SOLUTION INTRAVENOUS at 14:59

## 2021-04-09 RX ADMIN — BUDESONIDE AND FORMOTEROL FUMARATE DIHYDRATE 2 PUFF(S): 160; 4.5 AEROSOL RESPIRATORY (INHALATION) at 21:21

## 2021-04-09 RX ADMIN — BUDESONIDE AND FORMOTEROL FUMARATE DIHYDRATE 2 PUFF(S): 160; 4.5 AEROSOL RESPIRATORY (INHALATION) at 06:25

## 2021-04-09 RX ADMIN — PANTOPRAZOLE SODIUM 40 MILLIGRAM(S): 20 TABLET, DELAYED RELEASE ORAL at 05:29

## 2021-04-09 RX ADMIN — WARFARIN SODIUM 6 MILLIGRAM(S): 2.5 TABLET ORAL at 17:43

## 2021-04-09 RX ADMIN — Medication 3 MILLILITER(S): at 22:32

## 2021-04-09 RX ADMIN — Medication 3 MILLILITER(S): at 15:39

## 2021-04-09 RX ADMIN — SIMVASTATIN 10 MILLIGRAM(S): 20 TABLET, FILM COATED ORAL at 21:22

## 2021-04-09 RX ADMIN — BUMETANIDE 2 MILLIGRAM(S): 0.25 INJECTION INTRAMUSCULAR; INTRAVENOUS at 05:29

## 2021-04-09 RX ADMIN — Medication 100 MILLIGRAM(S): at 11:03

## 2021-04-09 RX ADMIN — HEPARIN SODIUM 1000 UNIT(S)/HR: 5000 INJECTION INTRAVENOUS; SUBCUTANEOUS at 11:02

## 2021-04-09 RX ADMIN — BUDESONIDE AND FORMOTEROL FUMARATE DIHYDRATE 2 PUFF(S): 160; 4.5 AEROSOL RESPIRATORY (INHALATION) at 11:00

## 2021-04-09 RX ADMIN — HEPARIN SODIUM 0 UNIT(S)/HR: 5000 INJECTION INTRAVENOUS; SUBCUTANEOUS at 08:23

## 2021-04-09 RX ADMIN — Medication 3 MILLILITER(S): at 03:00

## 2021-04-09 NOTE — CHART NOTE - NSCHARTNOTEFT_GEN_A_CORE
PTT noted to be >200 however as per RN was drawn above IV infusion, PTT was rechecked and is 97, will continue running heaprin gtt at 10mL/hr as pt has been therapeutic x4 days at this rate.

## 2021-04-09 NOTE — PROGRESS NOTE ADULT - ASSESSMENT
70y Female with history of obesity, severe MR and TR s/p repair, COPD on home O2 presents with dyspnea. Nephrology consulted for elevated Scr.    1. MISAEL: likely due to CRS. Scr improved. UA bland. FeUrea low. Renal US with L renal solid lesion for which urology following for which patient will likely need outpatient open MRI. Avoid nephrotoxins.   2. CKD-3b: Baseline Scr appears to be 1.4. UA bland without proteinuria. Outpatient CKD work up. Monitor electrolytes.  3. HTN with CKD: BP controlled with negative orthostatics. Holding lisinopril. Monitor BP.  4. LE edema: Improving. Continue with bumex 2 mg PO daily (patient with 1.5L UO). TTE unable to visualize LV. Monitor UO.  5. Hyponatremia: In setting of HF. Serum Na acceptable. Monitor serum Na.   6. Anemia: With iron deficiency. Will give venofer 200 mg dose 3/3 today. Monitor Hb.      [facility] NEPHROLOGY  Rodrigue Amador M.D.  Rob Perez D.O.  Ana Song M.D.  Lucrecia López, MSN, ANP-C    Telephone: [phone]  Facsimile: [phone]    [address] [place] [address]

## 2021-04-09 NOTE — PROVIDER CONTACT NOTE (CRITICAL VALUE NOTIFICATION) - BACKGROUND
69y/o F w/ h/o morbid obesity, CAD, HFpEF (EF 65-60%), severe MR and TR s/p mechanical MV and TV repair (on coumadin), Afib s/p failed ablation, HTN, HLD, HTN, MIGUEL, COPD (on 3L O2), CKD3, gout, anemia (on iron pills) p/w worsening dyspnea likely in the setting of acute on chronic heart failure exacerbation.
Pt received on Heparin infusion running at 13mL/hr.
71y/o F w/ h/o morbid obesity, CAD, HFpEF (EF 65-60%), severe MR and TR s/p mechanical MV and TV repair (on coumadin), Afib s/p failed ablation, HTN, HLD, HTN, MIGUEL, COPD (on 3L O2), CKD3, gout, anemia (on iron pills) p/w worsening dyspnea likely in the setting of acute on chronic heart failure exacerbation.

## 2021-04-09 NOTE — PROVIDER CONTACT NOTE (CRITICAL VALUE NOTIFICATION) - ASSESSMENT
pt is sleeping in bed, comfortably at this time.
pt is A&O4. resting comfortably in bed.
Pt has no signs and symptoms of bleeding and is asymptomatic.

## 2021-04-09 NOTE — PROGRESS NOTE ADULT - ASSESSMENT
hgb stable  no overt bleeding reported  pt a and o reports brown stool  conservative mangemnt  will follwo with you.   no gi contraindicaiton to a/c

## 2021-04-10 LAB
ANION GAP SERPL CALC-SCNC: 9 MMOL/L — SIGNIFICANT CHANGE UP (ref 7–14)
APTT BLD: 103.3 SEC — HIGH (ref 27–36.3)
APTT BLD: 77.7 SEC — HIGH (ref 27–36.3)
BUN SERPL-MCNC: 66 MG/DL — HIGH (ref 7–23)
CALCIUM SERPL-MCNC: 9.9 MG/DL — SIGNIFICANT CHANGE UP (ref 8.4–10.5)
CHLORIDE SERPL-SCNC: 96 MMOL/L — LOW (ref 98–107)
CO2 SERPL-SCNC: 31 MMOL/L — SIGNIFICANT CHANGE UP (ref 22–31)
CREAT SERPL-MCNC: 1.56 MG/DL — HIGH (ref 0.5–1.3)
GLUCOSE SERPL-MCNC: 114 MG/DL — HIGH (ref 70–99)
HCT VFR BLD CALC: 25.9 % — LOW (ref 34.5–45)
HGB BLD-MCNC: 7.6 G/DL — LOW (ref 11.5–15.5)
INR BLD: 1.54 RATIO — HIGH (ref 0.88–1.16)
MAGNESIUM SERPL-MCNC: 2.2 MG/DL — SIGNIFICANT CHANGE UP (ref 1.6–2.6)
MCHC RBC-ENTMCNC: 27.3 PG — SIGNIFICANT CHANGE UP (ref 27–34)
MCHC RBC-ENTMCNC: 29.3 GM/DL — LOW (ref 32–36)
MCV RBC AUTO: 93.2 FL — SIGNIFICANT CHANGE UP (ref 80–100)
NRBC # BLD: 0 /100 WBCS — SIGNIFICANT CHANGE UP
NRBC # FLD: 0 K/UL — SIGNIFICANT CHANGE UP
PLATELET # BLD AUTO: 280 K/UL — SIGNIFICANT CHANGE UP (ref 150–400)
POTASSIUM SERPL-MCNC: 5.1 MMOL/L — SIGNIFICANT CHANGE UP (ref 3.5–5.3)
POTASSIUM SERPL-SCNC: 5.1 MMOL/L — SIGNIFICANT CHANGE UP (ref 3.5–5.3)
PROTHROM AB SERPL-ACNC: 17.3 SEC — HIGH (ref 10.6–13.6)
RBC # BLD: 2.78 M/UL — LOW (ref 3.8–5.2)
RBC # FLD: 16.4 % — HIGH (ref 10.3–14.5)
SODIUM SERPL-SCNC: 136 MMOL/L — SIGNIFICANT CHANGE UP (ref 135–145)
WBC # BLD: 6.62 K/UL — SIGNIFICANT CHANGE UP (ref 3.8–10.5)
WBC # FLD AUTO: 6.62 K/UL — SIGNIFICANT CHANGE UP (ref 3.8–10.5)

## 2021-04-10 RX ORDER — ERYTHROPOIETIN 10000 [IU]/ML
10000 INJECTION, SOLUTION INTRAVENOUS; SUBCUTANEOUS ONCE
Refills: 0 | Status: COMPLETED | OUTPATIENT
Start: 2021-04-10 | End: 2021-04-10

## 2021-04-10 RX ORDER — WARFARIN SODIUM 2.5 MG/1
6 TABLET ORAL ONCE
Refills: 0 | Status: COMPLETED | OUTPATIENT
Start: 2021-04-10 | End: 2021-04-10

## 2021-04-10 RX ORDER — OXYMETAZOLINE HYDROCHLORIDE 0.5 MG/ML
2 SPRAY NASAL ONCE
Refills: 0 | Status: COMPLETED | OUTPATIENT
Start: 2021-04-10 | End: 2021-04-10

## 2021-04-10 RX ADMIN — HEPARIN SODIUM 800 UNIT(S)/HR: 5000 INJECTION INTRAVENOUS; SUBCUTANEOUS at 19:31

## 2021-04-10 RX ADMIN — Medication 100 MILLIGRAM(S): at 12:16

## 2021-04-10 RX ADMIN — BUMETANIDE 2 MILLIGRAM(S): 0.25 INJECTION INTRAMUSCULAR; INTRAVENOUS at 07:06

## 2021-04-10 RX ADMIN — SIMVASTATIN 10 MILLIGRAM(S): 20 TABLET, FILM COATED ORAL at 22:37

## 2021-04-10 RX ADMIN — PANTOPRAZOLE SODIUM 40 MILLIGRAM(S): 20 TABLET, DELAYED RELEASE ORAL at 07:06

## 2021-04-10 RX ADMIN — BUDESONIDE AND FORMOTEROL FUMARATE DIHYDRATE 2 PUFF(S): 160; 4.5 AEROSOL RESPIRATORY (INHALATION) at 22:37

## 2021-04-10 RX ADMIN — HEPARIN SODIUM 800 UNIT(S)/HR: 5000 INJECTION INTRAVENOUS; SUBCUTANEOUS at 12:16

## 2021-04-10 RX ADMIN — WARFARIN SODIUM 6 MILLIGRAM(S): 2.5 TABLET ORAL at 17:50

## 2021-04-10 RX ADMIN — Medication 3 MILLILITER(S): at 11:03

## 2021-04-10 RX ADMIN — BUDESONIDE AND FORMOTEROL FUMARATE DIHYDRATE 2 PUFF(S): 160; 4.5 AEROSOL RESPIRATORY (INHALATION) at 09:38

## 2021-04-10 RX ADMIN — ERYTHROPOIETIN 10000 UNIT(S): 10000 INJECTION, SOLUTION INTRAVENOUS; SUBCUTANEOUS at 13:24

## 2021-04-10 RX ADMIN — Medication 3 MILLILITER(S): at 22:22

## 2021-04-10 RX ADMIN — OXYMETAZOLINE HYDROCHLORIDE 2 SPRAY(S): 0.5 SPRAY NASAL at 04:09

## 2021-04-10 NOTE — CHART NOTE - NSCHARTNOTEFT_GEN_A_CORE
Notified by RN patient with episode of bleeding from Right nostril. Patient seen and assessed at bedside. Patient sitting in bed. Tissues saturated with blood mixed with blood clots noted on bedside table. Scant amount of bleeding at the time of assessment.  Recommended to apply pressure. Afrin ordered. Patient is currently on heparin drip. Will hold heparin drip until bleeding resolves. Will check vital signs.

## 2021-04-10 NOTE — PROGRESS NOTE ADULT - ASSESSMENT
70y Female with history of obesity, severe MR and TR s/p repair, COPD on home O2 presents with dyspnea. Nephrology consulted for elevated Scr.    1. MISAEL: likely due to CRS. Scr improving. UA bland. FeUrea low. Renal US with L renal solid lesion for which urology following for which patient will likely need outpatient open MRI. Avoid nephrotoxins.   2. CKD-3b: Baseline Scr appears to be 1.4. UA bland without proteinuria. Outpatient CKD work up. Monitor electrolytes.  3. HTN with CKD: BP controlled with negative orthostatics. Holding lisinopril. Monitor BP.  4. LE edema: Improving. Continue with bumex 2 mg PO daily. TTE unable to visualize LV. Monitor UO.  5. Hyponatremia: In setting of HF. Serum Na acceptable. Monitor serum Na.   6. Anemia: With iron deficiency. s/p venofer 200 mg x3 doses. Will give Epo 10k units SC x1.  Monitor Hb.      [facility] NEPHROLOGY  Rodrigue Amador M.D.  Rob Perez D.O.  Ana Song M.D.  Lucrecia López, ALLISON, ANP-C    Telephone: [phone]  Facsimile: [phone]    [address] [place] [address]

## 2021-04-10 NOTE — PROGRESS NOTE ADULT - ASSESSMENT
hgb is stable. no acute sign of bleeding. continue currrent reigmen. no acute complaints. follo h/h continue ac

## 2021-04-11 LAB
ANION GAP SERPL CALC-SCNC: 4 MMOL/L — LOW (ref 7–14)
APTT BLD: 71.6 SEC — HIGH (ref 27–36.3)
BUN SERPL-MCNC: 67 MG/DL — HIGH (ref 7–23)
CALCIUM SERPL-MCNC: 10 MG/DL — SIGNIFICANT CHANGE UP (ref 8.4–10.5)
CHLORIDE SERPL-SCNC: 94 MMOL/L — LOW (ref 98–107)
CK MB BLD-MCNC: 2.7 % — HIGH (ref 0–2.5)
CK MB BLD-MCNC: 2.7 % — HIGH (ref 0–2.5)
CK MB CFR SERPL CALC: 1 NG/ML — SIGNIFICANT CHANGE UP
CK MB CFR SERPL CALC: 1 NG/ML — SIGNIFICANT CHANGE UP
CK SERPL-CCNC: 37 U/L — SIGNIFICANT CHANGE UP (ref 25–170)
CK SERPL-CCNC: 37 U/L — SIGNIFICANT CHANGE UP (ref 25–170)
CO2 SERPL-SCNC: 36 MMOL/L — HIGH (ref 22–31)
CREAT SERPL-MCNC: 1.58 MG/DL — HIGH (ref 0.5–1.3)
GLUCOSE SERPL-MCNC: 104 MG/DL — HIGH (ref 70–99)
HCT VFR BLD CALC: 27.4 % — LOW (ref 34.5–45)
HGB BLD-MCNC: 7.9 G/DL — LOW (ref 11.5–15.5)
INR BLD: 1.62 RATIO — HIGH (ref 0.88–1.16)
INR BLD: 1.7 RATIO — HIGH (ref 0.88–1.16)
MAGNESIUM SERPL-MCNC: 2.2 MG/DL — SIGNIFICANT CHANGE UP (ref 1.6–2.6)
MCHC RBC-ENTMCNC: 27.5 PG — SIGNIFICANT CHANGE UP (ref 27–34)
MCHC RBC-ENTMCNC: 28.8 GM/DL — LOW (ref 32–36)
MCV RBC AUTO: 95.5 FL — SIGNIFICANT CHANGE UP (ref 80–100)
NRBC # BLD: 0 /100 WBCS — SIGNIFICANT CHANGE UP
NRBC # FLD: 0.02 K/UL — HIGH
OB PNL STL: POSITIVE
PLATELET # BLD AUTO: 269 K/UL — SIGNIFICANT CHANGE UP (ref 150–400)
POTASSIUM SERPL-MCNC: 5 MMOL/L — SIGNIFICANT CHANGE UP (ref 3.5–5.3)
POTASSIUM SERPL-SCNC: 5 MMOL/L — SIGNIFICANT CHANGE UP (ref 3.5–5.3)
PROTHROM AB SERPL-ACNC: 18 SEC — HIGH (ref 10.6–13.6)
PROTHROM AB SERPL-ACNC: 18.9 SEC — HIGH (ref 10.6–13.6)
RBC # BLD: 2.87 M/UL — LOW (ref 3.8–5.2)
RBC # FLD: 16.5 % — HIGH (ref 10.3–14.5)
SODIUM SERPL-SCNC: 134 MMOL/L — LOW (ref 135–145)
TROPONIN T, HIGH SENSITIVITY RESULT: 21 NG/L — SIGNIFICANT CHANGE UP
TROPONIN T, HIGH SENSITIVITY RESULT: 22 NG/L — SIGNIFICANT CHANGE UP
WBC # BLD: 6.39 K/UL — SIGNIFICANT CHANGE UP (ref 3.8–10.5)
WBC # FLD AUTO: 6.39 K/UL — SIGNIFICANT CHANGE UP (ref 3.8–10.5)

## 2021-04-11 RX ORDER — WARFARIN SODIUM 2.5 MG/1
7.5 TABLET ORAL ONCE
Refills: 0 | Status: COMPLETED | OUTPATIENT
Start: 2021-04-11 | End: 2021-04-11

## 2021-04-11 RX ORDER — WARFARIN SODIUM 2.5 MG/1
6 TABLET ORAL ONCE
Refills: 0 | Status: DISCONTINUED | OUTPATIENT
Start: 2021-04-11 | End: 2021-04-11

## 2021-04-11 RX ADMIN — SIMVASTATIN 10 MILLIGRAM(S): 20 TABLET, FILM COATED ORAL at 22:12

## 2021-04-11 RX ADMIN — WARFARIN SODIUM 7.5 MILLIGRAM(S): 2.5 TABLET ORAL at 17:33

## 2021-04-11 RX ADMIN — PANTOPRAZOLE SODIUM 40 MILLIGRAM(S): 20 TABLET, DELAYED RELEASE ORAL at 06:50

## 2021-04-11 RX ADMIN — BUDESONIDE AND FORMOTEROL FUMARATE DIHYDRATE 2 PUFF(S): 160; 4.5 AEROSOL RESPIRATORY (INHALATION) at 22:11

## 2021-04-11 RX ADMIN — HEPARIN SODIUM 800 UNIT(S)/HR: 5000 INJECTION INTRAVENOUS; SUBCUTANEOUS at 07:40

## 2021-04-11 RX ADMIN — Medication 100 MILLIGRAM(S): at 11:45

## 2021-04-11 RX ADMIN — BUMETANIDE 2 MILLIGRAM(S): 0.25 INJECTION INTRAMUSCULAR; INTRAVENOUS at 06:50

## 2021-04-11 RX ADMIN — Medication 3 MILLILITER(S): at 15:17

## 2021-04-11 RX ADMIN — Medication 3 MILLILITER(S): at 10:57

## 2021-04-11 RX ADMIN — Medication 3 MILLILITER(S): at 22:46

## 2021-04-11 RX ADMIN — Medication 30 MILLILITER(S): at 11:45

## 2021-04-11 RX ADMIN — BUDESONIDE AND FORMOTEROL FUMARATE DIHYDRATE 2 PUFF(S): 160; 4.5 AEROSOL RESPIRATORY (INHALATION) at 09:45

## 2021-04-11 NOTE — PROGRESS NOTE ADULT - ASSESSMENT
70y Female with history of obesity, severe MR and TR s/p repair, COPD on home O2 presents with dyspnea. Nephrology consulted for elevated Scr.    1. MISAEL: likely due to CRS. Scr improving. UA bland. FeUrea low. Renal US with L renal solid lesion for which urology following for which patient will likely need outpatient open MRI. Avoid nephrotoxins.   2. CKD-3b: Baseline Scr appears to be 1.4. UA bland without proteinuria. Outpatient CKD work up. Monitor electrolytes.  3. HTN with CKD: BP controlled with negative orthostatics. Holding lisinopril. Monitor BP.  4. LE edema: Resolved on bumex 2 mg PO daily. TTE unable to visualize LV. Pt with elevated CO2- check ABG. Monitor UO.  5. Hyponatremia: In setting of HF. Serum Na acceptable. Monitor serum Na.   6. Anemia: With iron deficiency. s/p venofer 200 mg x3 doses. s/p Epo 10k units SC x1 on 4/10. Transfuse prbc prn.  Monitor Hb.      [facility] NEPHROLOGY  Rodrigue Amador M.D.  LUIS ALBERTO EnglishO.  Ana Song M.D.  Lucrecia López, MSN, ANP-C    Telephone: [phone]  Facsimile: [phone]    [address] [place] [address]

## 2021-04-11 NOTE — DIETITIAN INITIAL EVALUATION ADULT. - PERTINENT LABORATORY DATA
04-11    134<L>  |  94<L>  |  67<H>  ----------------------------<  104<H>  5.0   |  36<H>  |  1.58<H>    Ca    10.0      11 Apr 2021 06:49  Mg     2.2     04-11

## 2021-04-11 NOTE — DIETITIAN INITIAL EVALUATION ADULT. - PERTINENT MEDS FT
MEDICATIONS  (STANDING):  albuterol/ipratropium for Nebulization 3 milliLiter(s) Nebulizer every 6 hours  allopurinol 100 milliGRAM(s) Oral daily  budesonide 160 MICROgram(s)/formoterol 4.5 MICROgram(s) Inhaler 2 Puff(s) Inhalation two times a day  buMETAnide 2 milliGRAM(s) Oral daily  heparin  Infusion. 1000 Unit(s)/Hr (10 mL/Hr) IV Continuous <Continuous>  pantoprazole    Tablet 40 milliGRAM(s) Oral before breakfast  simvastatin 10 milliGRAM(s) Oral at bedtime  warfarin 6 milliGRAM(s) Oral once

## 2021-04-11 NOTE — DIETITIAN INITIAL EVALUATION ADULT. - OTHER INFO
Pt. w PMH morbid obesity, CAD, CHF, MR and TR s/p repair on Warfarin, Afib, MIGUEL, COPD, HTN, HLD, gout, CKD, anemia (on iron), presenting with dyspnea.  Currently with acute on chronic heart failure.    Pt. denies food allergies, nausea/vomiting/diarrhea/constipation, or issues with swallowing.  Offered soft diet, which Pt. is amenable to, given reports of chewing difficulty.  Last noted BM (4/10).     Endorses good appetite/PO intake during admission.  RDN reviewed heart healthy diet modifications, which Pt. states she is familiar with.  Dietary fluid restriction advised.      Of note, Pt. observed wearing nasal cannula, however appears SOB @ time of RDN encounter.  Informed RN.    Pt. denies weight changes PTA from stated usual body weight of 230lbs (104.3kg).  Currently with overall ~2-3kg weight increase since admission, despite PO diuretic.  RDN obtained re-weight which correlates with currently noted weight (109.0kg).

## 2021-04-11 NOTE — DIETITIAN INITIAL EVALUATION ADULT. - PROBLEM SELECTOR PLAN 1
-VILLA, orthopnea, and pulmonary vascular congestion -- symptoms are clinically consistent with ADHF exacerbation, less likely COPD exacerbation given no increase in cough, sputum production, or O2 requirement  -will diurese with IV Lasix 40 mg BID  -strict I's and O's  -daily weight  -repeat TTE

## 2021-04-11 NOTE — DIETITIAN INITIAL EVALUATION ADULT. - LAB (SPECIFY)
-Obtain HbA1c to help further assess long term glycemic status, as Pt. with frequently elevated serum glucose values.

## 2021-04-12 LAB
A1C WITH ESTIMATED AVERAGE GLUCOSE RESULT: 5.6 % — SIGNIFICANT CHANGE UP (ref 4–5.6)
ANION GAP SERPL CALC-SCNC: 7 MMOL/L — SIGNIFICANT CHANGE UP (ref 7–14)
APTT BLD: 70.6 SEC — HIGH (ref 27–36.3)
BLD GP AB SCN SERPL QL: NEGATIVE — SIGNIFICANT CHANGE UP
BUN SERPL-MCNC: 61 MG/DL — HIGH (ref 7–23)
CALCIUM SERPL-MCNC: 10.1 MG/DL — SIGNIFICANT CHANGE UP (ref 8.4–10.5)
CHLORIDE SERPL-SCNC: 93 MMOL/L — LOW (ref 98–107)
CO2 SERPL-SCNC: 36 MMOL/L — HIGH (ref 22–31)
CREAT SERPL-MCNC: 1.55 MG/DL — HIGH (ref 0.5–1.3)
ESTIMATED AVERAGE GLUCOSE: 114 MG/DL — SIGNIFICANT CHANGE UP (ref 68–114)
GLUCOSE SERPL-MCNC: 97 MG/DL — SIGNIFICANT CHANGE UP (ref 70–99)
HCT VFR BLD CALC: 27.5 % — LOW (ref 34.5–45)
HGB BLD-MCNC: 7.9 G/DL — LOW (ref 11.5–15.5)
INR BLD: 1.7 RATIO — HIGH (ref 0.88–1.16)
MAGNESIUM SERPL-MCNC: 2.1 MG/DL — SIGNIFICANT CHANGE UP (ref 1.6–2.6)
MCHC RBC-ENTMCNC: 27.6 PG — SIGNIFICANT CHANGE UP (ref 27–34)
MCHC RBC-ENTMCNC: 28.7 GM/DL — LOW (ref 32–36)
MCV RBC AUTO: 96.2 FL — SIGNIFICANT CHANGE UP (ref 80–100)
NRBC # BLD: 0 /100 WBCS — SIGNIFICANT CHANGE UP
NRBC # FLD: 0 K/UL — SIGNIFICANT CHANGE UP
PLATELET # BLD AUTO: 294 K/UL — SIGNIFICANT CHANGE UP (ref 150–400)
POTASSIUM SERPL-MCNC: 5.1 MMOL/L — SIGNIFICANT CHANGE UP (ref 3.5–5.3)
POTASSIUM SERPL-SCNC: 5.1 MMOL/L — SIGNIFICANT CHANGE UP (ref 3.5–5.3)
PROTHROM AB SERPL-ACNC: 19.1 SEC — HIGH (ref 10.6–13.6)
RBC # BLD: 2.86 M/UL — LOW (ref 3.8–5.2)
RBC # FLD: 16.8 % — HIGH (ref 10.3–14.5)
RH IG SCN BLD-IMP: POSITIVE — SIGNIFICANT CHANGE UP
SODIUM SERPL-SCNC: 136 MMOL/L — SIGNIFICANT CHANGE UP (ref 135–145)
WBC # BLD: 6.48 K/UL — SIGNIFICANT CHANGE UP (ref 3.8–10.5)
WBC # FLD AUTO: 6.48 K/UL — SIGNIFICANT CHANGE UP (ref 3.8–10.5)

## 2021-04-12 PROCEDURE — 74176 CT ABD & PELVIS W/O CONTRAST: CPT | Mod: 26

## 2021-04-12 RX ORDER — WARFARIN SODIUM 2.5 MG/1
10 TABLET ORAL ONCE
Refills: 0 | Status: COMPLETED | OUTPATIENT
Start: 2021-04-12 | End: 2021-04-12

## 2021-04-12 RX ADMIN — HEPARIN SODIUM 800 UNIT(S)/HR: 5000 INJECTION INTRAVENOUS; SUBCUTANEOUS at 07:41

## 2021-04-12 RX ADMIN — Medication 3 MILLILITER(S): at 10:17

## 2021-04-12 RX ADMIN — Medication 3 MILLILITER(S): at 22:29

## 2021-04-12 RX ADMIN — SIMVASTATIN 10 MILLIGRAM(S): 20 TABLET, FILM COATED ORAL at 21:22

## 2021-04-12 RX ADMIN — BUDESONIDE AND FORMOTEROL FUMARATE DIHYDRATE 2 PUFF(S): 160; 4.5 AEROSOL RESPIRATORY (INHALATION) at 13:14

## 2021-04-12 RX ADMIN — BUDESONIDE AND FORMOTEROL FUMARATE DIHYDRATE 2 PUFF(S): 160; 4.5 AEROSOL RESPIRATORY (INHALATION) at 21:22

## 2021-04-12 RX ADMIN — Medication 100 MILLIGRAM(S): at 13:14

## 2021-04-12 RX ADMIN — PANTOPRAZOLE SODIUM 40 MILLIGRAM(S): 20 TABLET, DELAYED RELEASE ORAL at 06:51

## 2021-04-12 RX ADMIN — BUMETANIDE 2 MILLIGRAM(S): 0.25 INJECTION INTRAMUSCULAR; INTRAVENOUS at 05:39

## 2021-04-12 RX ADMIN — Medication 3 MILLILITER(S): at 03:39

## 2021-04-12 RX ADMIN — WARFARIN SODIUM 10 MILLIGRAM(S): 2.5 TABLET ORAL at 18:32

## 2021-04-12 NOTE — PROGRESS NOTE ADULT - ASSESSMENT
conservative gi managemnt  pt with no abdominal pain (reported by pa yesterday0  fobt positibe, would not persue endospic eval nase don comorbnidies  hgb is stable recommend non contrsat ct to rule out ocuclt malignancy

## 2021-04-12 NOTE — PROGRESS NOTE ADULT - ASSESSMENT
70y Female with history of obesity, severe MR and TR s/p repair, COPD on home O2 presents with dyspnea. Nephrology consulted for elevated Scr.    1. MISAEL: likely due to CRS. Scr stable. UA bland. FeUrea low. Continue bumex.  Renal US with L renal solid lesion for which urology following for which patient will likely need outpatient open MRI. Avoid nephrotoxins.   2. CKD-3b: Baseline Scr appears to be 1.4. UA bland without proteinuria. Outpatient CKD work up. Monitor electrolytes.  3. HTN with CKD: BP controlled with negative orthostatics. Holding lisinopril. Monitor BP.  4. LE edema: Resolved on bumex 2 mg PO daily. TTE unable to visualize LV. Pt with elevated CO2- check ABG. Monitor UO.  5. Hyponatremia: In setting of HF. Serum Na acceptable. Monitor serum Na.   6. Anemia: With iron deficiency. s/p venofer 200 mg x3 doses. s/p Epo 10k units SC x1 on 4/10. Transfuse prbc prn.  Monitor Hb.      [facility] NEPHROLOGY  Rodrigue Amador M.D.  LUIS ALBERTO EnglishO.  Ana Song M.D.  Lucrecia López, MSN, ANP-C    Telephone: [phone]  Facsimile: [phone]    [address] [place] [address]

## 2021-04-13 LAB
ANION GAP SERPL CALC-SCNC: 4 MMOL/L — LOW (ref 7–14)
APTT BLD: 73.1 SEC — HIGH (ref 27–36.3)
BUN SERPL-MCNC: 61 MG/DL — HIGH (ref 7–23)
CALCIUM SERPL-MCNC: 10.3 MG/DL — SIGNIFICANT CHANGE UP (ref 8.4–10.5)
CHLORIDE SERPL-SCNC: 92 MMOL/L — LOW (ref 98–107)
CO2 SERPL-SCNC: 37 MMOL/L — HIGH (ref 22–31)
CREAT SERPL-MCNC: 1.44 MG/DL — HIGH (ref 0.5–1.3)
GLUCOSE SERPL-MCNC: 99 MG/DL — SIGNIFICANT CHANGE UP (ref 70–99)
HCT VFR BLD CALC: 28.1 % — LOW (ref 34.5–45)
HGB BLD-MCNC: 8 G/DL — LOW (ref 11.5–15.5)
INR BLD: 1.87 RATIO — HIGH (ref 0.88–1.16)
MAGNESIUM SERPL-MCNC: 2 MG/DL — SIGNIFICANT CHANGE UP (ref 1.6–2.6)
MCHC RBC-ENTMCNC: 27.2 PG — SIGNIFICANT CHANGE UP (ref 27–34)
MCHC RBC-ENTMCNC: 28.5 GM/DL — LOW (ref 32–36)
MCV RBC AUTO: 95.6 FL — SIGNIFICANT CHANGE UP (ref 80–100)
NRBC # BLD: 0 /100 WBCS — SIGNIFICANT CHANGE UP
NRBC # FLD: 0.02 K/UL — HIGH
NT-PROBNP SERPL-SCNC: 1187 PG/ML — HIGH
PHOSPHATE SERPL-MCNC: 3.3 MG/DL — SIGNIFICANT CHANGE UP (ref 2.5–4.5)
PLATELET # BLD AUTO: 279 K/UL — SIGNIFICANT CHANGE UP (ref 150–400)
POTASSIUM SERPL-MCNC: 5.1 MMOL/L — SIGNIFICANT CHANGE UP (ref 3.5–5.3)
POTASSIUM SERPL-SCNC: 5.1 MMOL/L — SIGNIFICANT CHANGE UP (ref 3.5–5.3)
PROTHROM AB SERPL-ACNC: 20.9 SEC — HIGH (ref 10.6–13.6)
RBC # BLD: 2.94 M/UL — LOW (ref 3.8–5.2)
RBC # FLD: 17 % — HIGH (ref 10.3–14.5)
SODIUM SERPL-SCNC: 133 MMOL/L — LOW (ref 135–145)
WBC # BLD: 6.76 K/UL — SIGNIFICANT CHANGE UP (ref 3.8–10.5)
WBC # FLD AUTO: 6.76 K/UL — SIGNIFICANT CHANGE UP (ref 3.8–10.5)

## 2021-04-13 PROCEDURE — 71045 X-RAY EXAM CHEST 1 VIEW: CPT | Mod: 26

## 2021-04-13 RX ORDER — BUMETANIDE 0.25 MG/ML
2 INJECTION INTRAMUSCULAR; INTRAVENOUS ONCE
Refills: 0 | Status: COMPLETED | OUTPATIENT
Start: 2021-04-13 | End: 2021-04-13

## 2021-04-13 RX ORDER — IPRATROPIUM/ALBUTEROL SULFATE 18-103MCG
3 AEROSOL WITH ADAPTER (GRAM) INHALATION EVERY 12 HOURS
Refills: 0 | Status: DISCONTINUED | OUTPATIENT
Start: 2021-04-13 | End: 2021-04-16

## 2021-04-13 RX ORDER — WARFARIN SODIUM 2.5 MG/1
10 TABLET ORAL ONCE
Refills: 0 | Status: COMPLETED | OUTPATIENT
Start: 2021-04-13 | End: 2021-04-13

## 2021-04-13 RX ADMIN — BUMETANIDE 2 MILLIGRAM(S): 0.25 INJECTION INTRAMUSCULAR; INTRAVENOUS at 05:36

## 2021-04-13 RX ADMIN — PANTOPRAZOLE SODIUM 40 MILLIGRAM(S): 20 TABLET, DELAYED RELEASE ORAL at 05:36

## 2021-04-13 RX ADMIN — BUMETANIDE 2 MILLIGRAM(S): 0.25 INJECTION INTRAMUSCULAR; INTRAVENOUS at 21:06

## 2021-04-13 RX ADMIN — HEPARIN SODIUM 800 UNIT(S)/HR: 5000 INJECTION INTRAVENOUS; SUBCUTANEOUS at 09:06

## 2021-04-13 RX ADMIN — Medication 100 MILLIGRAM(S): at 11:06

## 2021-04-13 RX ADMIN — BUDESONIDE AND FORMOTEROL FUMARATE DIHYDRATE 2 PUFF(S): 160; 4.5 AEROSOL RESPIRATORY (INHALATION) at 09:05

## 2021-04-13 RX ADMIN — BUDESONIDE AND FORMOTEROL FUMARATE DIHYDRATE 2 PUFF(S): 160; 4.5 AEROSOL RESPIRATORY (INHALATION) at 21:06

## 2021-04-13 RX ADMIN — WARFARIN SODIUM 10 MILLIGRAM(S): 2.5 TABLET ORAL at 17:00

## 2021-04-13 RX ADMIN — SIMVASTATIN 10 MILLIGRAM(S): 20 TABLET, FILM COATED ORAL at 21:06

## 2021-04-13 RX ADMIN — Medication 3 MILLILITER(S): at 10:14

## 2021-04-13 RX ADMIN — Medication 3 MILLILITER(S): at 19:07

## 2021-04-13 NOTE — PROGRESS NOTE ADULT - ASSESSMENT
70y Female with history of obesity, severe MR and TR s/p repair, COPD on home O2 presents with dyspnea. Nephrology consulted for elevated Scr.    1. MISAEL: likely due to CRS. MISAEL resolved. UA bland. FeUrea low. Renal US and CT with L renal solid lesion with urology following for which patient will likely need outpatient open MRI. Avoid nephrotoxins.   2. CKD-3b: Baseline Scr appears to be 1.4. UA bland without proteinuria. Outpatient CKD work up. Monitor electrolytes.  3. HTN with CKD: BP controlled with negative orthostatics. Holding lisinopril. Monitor BP.  4. LE edema: Resolved on bumex 2 mg PO daily. TTE unable to visualize LV. Monitor UO.  5. Hyponatremia: In setting of HF. Serum Na acceptable. Monitor serum Na.   6. Anemia: With iron deficiency and patient occult positive s/p venofer 200 mg x 3 doses and Epo on 4/10. Monitor Hb.      [facility] NEPHROLOGY  Rodrigue Amador M.D.  Rob Perez D.O.  Ana Song M.D.  Lucrecia López, ALLISON, ANP-C    Telephone: [phone]  Facsimile: [phone]    [address] [place] [address]

## 2021-04-13 NOTE — PROGRESS NOTE ADULT - ASSESSMENT
no sign of overt gib. guiac positive  risk of endosopci evalation greater than benefit. would continue ac  no abdominal pain

## 2021-04-14 LAB
ANION GAP SERPL CALC-SCNC: 7 MMOL/L — SIGNIFICANT CHANGE UP (ref 7–14)
APTT BLD: 107 SEC — SIGNIFICANT CHANGE UP (ref 27–36.3)
APTT BLD: 52.5 SEC — HIGH (ref 27–36.3)
BASOPHILS # BLD AUTO: 0.03 K/UL — SIGNIFICANT CHANGE UP (ref 0–0.2)
BASOPHILS NFR BLD AUTO: 0.4 % — SIGNIFICANT CHANGE UP (ref 0–2)
BUN SERPL-MCNC: 61 MG/DL — HIGH (ref 7–23)
CALCIUM SERPL-MCNC: 10.3 MG/DL — SIGNIFICANT CHANGE UP (ref 8.4–10.5)
CHLORIDE SERPL-SCNC: 94 MMOL/L — LOW (ref 98–107)
CO2 SERPL-SCNC: 36 MMOL/L — HIGH (ref 22–31)
CREAT SERPL-MCNC: 1.53 MG/DL — HIGH (ref 0.5–1.3)
EOSINOPHIL # BLD AUTO: 0.14 K/UL — SIGNIFICANT CHANGE UP (ref 0–0.5)
EOSINOPHIL NFR BLD AUTO: 1.9 % — SIGNIFICANT CHANGE UP (ref 0–6)
GLUCOSE SERPL-MCNC: 96 MG/DL — SIGNIFICANT CHANGE UP (ref 70–99)
HCT VFR BLD CALC: 27.6 % — LOW (ref 34.5–45)
HGB BLD-MCNC: 8.1 G/DL — LOW (ref 11.5–15.5)
IANC: 4.56 K/UL — SIGNIFICANT CHANGE UP (ref 1.5–8.5)
IMM GRANULOCYTES NFR BLD AUTO: 1 % — SIGNIFICANT CHANGE UP (ref 0–1.5)
INR BLD: 2.1 RATIO — HIGH (ref 0.88–1.16)
INR BLD: 2.11 RATIO — HIGH (ref 0.88–1.16)
LYMPHOCYTES # BLD AUTO: 1.71 K/UL — SIGNIFICANT CHANGE UP (ref 1–3.3)
LYMPHOCYTES # BLD AUTO: 23.6 % — SIGNIFICANT CHANGE UP (ref 13–44)
MAGNESIUM SERPL-MCNC: 1.9 MG/DL — SIGNIFICANT CHANGE UP (ref 1.6–2.6)
MCHC RBC-ENTMCNC: 28 PG — SIGNIFICANT CHANGE UP (ref 27–34)
MCHC RBC-ENTMCNC: 29.3 GM/DL — LOW (ref 32–36)
MCV RBC AUTO: 95.5 FL — SIGNIFICANT CHANGE UP (ref 80–100)
MONOCYTES # BLD AUTO: 0.74 K/UL — SIGNIFICANT CHANGE UP (ref 0–0.9)
MONOCYTES NFR BLD AUTO: 10.2 % — SIGNIFICANT CHANGE UP (ref 2–14)
NEUTROPHILS # BLD AUTO: 4.56 K/UL — SIGNIFICANT CHANGE UP (ref 1.8–7.4)
NEUTROPHILS NFR BLD AUTO: 62.9 % — SIGNIFICANT CHANGE UP (ref 43–77)
NRBC # BLD: 0 /100 WBCS — SIGNIFICANT CHANGE UP
NRBC # FLD: 0.03 K/UL — HIGH
PHOSPHATE SERPL-MCNC: 3.2 MG/DL — SIGNIFICANT CHANGE UP (ref 2.5–4.5)
PLATELET # BLD AUTO: 285 K/UL — SIGNIFICANT CHANGE UP (ref 150–400)
POTASSIUM SERPL-MCNC: 4.8 MMOL/L — SIGNIFICANT CHANGE UP (ref 3.5–5.3)
POTASSIUM SERPL-SCNC: 4.8 MMOL/L — SIGNIFICANT CHANGE UP (ref 3.5–5.3)
PROTHROM AB SERPL-ACNC: 23.2 SEC — HIGH (ref 10.6–13.6)
PROTHROM AB SERPL-ACNC: 23.3 SEC — HIGH (ref 10.6–13.6)
RBC # BLD: 2.89 M/UL — LOW (ref 3.8–5.2)
RBC # FLD: 17.2 % — HIGH (ref 10.3–14.5)
SODIUM SERPL-SCNC: 137 MMOL/L — SIGNIFICANT CHANGE UP (ref 135–145)
WBC # BLD: 7.25 K/UL — SIGNIFICANT CHANGE UP (ref 3.8–10.5)
WBC # FLD AUTO: 7.25 K/UL — SIGNIFICANT CHANGE UP (ref 3.8–10.5)

## 2021-04-14 PROCEDURE — 99232 SBSQ HOSP IP/OBS MODERATE 35: CPT

## 2021-04-14 RX ORDER — BUMETANIDE 0.25 MG/ML
2 INJECTION INTRAMUSCULAR; INTRAVENOUS
Refills: 0 | Status: DISCONTINUED | OUTPATIENT
Start: 2021-04-14 | End: 2021-04-15

## 2021-04-14 RX ORDER — WARFARIN SODIUM 2.5 MG/1
7.5 TABLET ORAL ONCE
Refills: 0 | Status: COMPLETED | OUTPATIENT
Start: 2021-04-14 | End: 2021-04-14

## 2021-04-14 RX ORDER — BUMETANIDE 0.25 MG/ML
2 INJECTION INTRAMUSCULAR; INTRAVENOUS
Refills: 0 | Status: DISCONTINUED | OUTPATIENT
Start: 2021-04-14 | End: 2021-04-14

## 2021-04-14 RX ADMIN — Medication 3 MILLILITER(S): at 22:30

## 2021-04-14 RX ADMIN — PANTOPRAZOLE SODIUM 40 MILLIGRAM(S): 20 TABLET, DELAYED RELEASE ORAL at 05:35

## 2021-04-14 RX ADMIN — BUMETANIDE 2 MILLIGRAM(S): 0.25 INJECTION INTRAMUSCULAR; INTRAVENOUS at 05:35

## 2021-04-14 RX ADMIN — HEPARIN SODIUM 800 UNIT(S)/HR: 5000 INJECTION INTRAVENOUS; SUBCUTANEOUS at 23:33

## 2021-04-14 RX ADMIN — BUDESONIDE AND FORMOTEROL FUMARATE DIHYDRATE 2 PUFF(S): 160; 4.5 AEROSOL RESPIRATORY (INHALATION) at 21:53

## 2021-04-14 RX ADMIN — WARFARIN SODIUM 7.5 MILLIGRAM(S): 2.5 TABLET ORAL at 17:37

## 2021-04-14 RX ADMIN — Medication 3 MILLILITER(S): at 08:29

## 2021-04-14 RX ADMIN — Medication 100 MILLIGRAM(S): at 12:04

## 2021-04-14 RX ADMIN — SIMVASTATIN 10 MILLIGRAM(S): 20 TABLET, FILM COATED ORAL at 21:53

## 2021-04-14 RX ADMIN — HEPARIN SODIUM 4000 UNIT(S): 5000 INJECTION INTRAVENOUS; SUBCUTANEOUS at 15:49

## 2021-04-14 RX ADMIN — BUDESONIDE AND FORMOTEROL FUMARATE DIHYDRATE 2 PUFF(S): 160; 4.5 AEROSOL RESPIRATORY (INHALATION) at 08:59

## 2021-04-14 RX ADMIN — HEPARIN SODIUM 1000 UNIT(S)/HR: 5000 INJECTION INTRAVENOUS; SUBCUTANEOUS at 15:44

## 2021-04-14 RX ADMIN — BUMETANIDE 2 MILLIGRAM(S): 0.25 INJECTION INTRAMUSCULAR; INTRAVENOUS at 17:37

## 2021-04-14 NOTE — PROGRESS NOTE ADULT - PROBLEM SELECTOR PLAN 8
-cont ac
-DVT ppx: heparin gtt  -Diet: DASH
-cont ac

## 2021-04-14 NOTE — PROGRESS NOTE ADULT - PROBLEM SELECTOR PLAN 5
-mechanical valve  cont ac

## 2021-04-14 NOTE — PROGRESS NOTE ADULT - PROBLEM SELECTOR PROBLEM 7
Stage 3 chronic kidney disease, unspecified whether stage 3a or 3b CKD

## 2021-04-14 NOTE — PROGRESS NOTE ADULT - PROBLEM SELECTOR PROBLEM 5
Mitral valve replaced

## 2021-04-14 NOTE — PROGRESS NOTE ADULT - PROBLEM SELECTOR PLAN 1
diuresis on bumex  - cr elevated -  cards f/u
diuresis on bumex  - cr elevated -  cards f/u
diuresis- cr elevated -  cards f/u
diuresis on bumex  - cr elevated -  cards f/u
diuresis  cards f/u
diuresis on bumex  - cr elevated -  cards f/u
diuresis- cr elevated -  cards f/u
diuresis on bumex  - cr elevated -  cards f/u
diuresis- cr elevated -  cards f/u
diuresis on bumex  - cr elevated -  cards f/u

## 2021-04-14 NOTE — PROGRESS NOTE ADULT - ASSESSMENT
Acute on chronic HFpEF  MISAEL on CKD, improving   Severe MR nd TR s/p mechanical valve  CAD  mechanical valve, on Coumadin  A fib s/p failed ablation  HTN  HLD  MIGUEL  COPD on 3 LPM nc  Anemia  Obesity    # Acute on chronic HFpEF  -- diffuse rales on the exam, TTE was poor study,   -- repeat CXR-- flushed pulmonary edema, Pro-Brain Natriuretic Peptide: 1187  -- pt is on Bumex 2 mg PO daily_ additional dose of Bumex 2 mg IVP given__ UOP 1 L , pt feels much better,   -- strict intake and output,   -- daily weight,   -- consider increasing Bumex to 2 mg twice daily,   -- continue monitoring    # MISAEL on CKD  BUN/Creat - 61/1.44 -- improving   -- continue trending,   -- strict intake and output     Time spend-- 45 min, History and Physical, Allergies/Medications, Patient History, Risk Assessment, Physical Exam, Labs and Results, Assessment and Plan, discussing with patient and nursing staff

## 2021-04-14 NOTE — PROGRESS NOTE ADULT - PROBLEM SELECTOR PROBLEM 1
Acute on chronic heart failure with normal ejection fraction

## 2021-04-14 NOTE — PROGRESS NOTE ADULT - PROBLEM SELECTOR PLAN 6
-c/w on 3L NC  -c/w Symbicort  -duonebs

## 2021-04-14 NOTE — PROGRESS NOTE ADULT - PROBLEM SELECTOR PLAN 4
-currently normotensive, hold ramipril

## 2021-04-14 NOTE — PROGRESS NOTE ADULT - PROBLEM SELECTOR PLAN 3
-pt with normocytic anemia,   - no signs of active bleeding at present
-pt with normocytic anemia, currently 7.8, nearly at baseline of 8  - no signs of active bleeding at present
-pt with normocytic anemia,   - no signs of active bleeding at present
-pt with normocytic anemia, currently 7.8, nearly at baseline of 8  - no signs of active bleeding at present
-pt with normocytic anemia,   - no signs of active bleeding at present

## 2021-04-14 NOTE — PROGRESS NOTE ADULT - PROBLEM SELECTOR PLAN 7
worsening renal fx   renal f/u
-baseline approximately 1.5-1.6, currently at baseline  -avoid  nephrotoxins and renally dose medications
worsening renal fx   renal f/u

## 2021-04-14 NOTE — PROGRESS NOTE ADULT - ASSESSMENT
70y Female with history of obesity, severe MR and TR s/p repair, COPD on home O2 presents with dyspnea. Nephrology consulted for elevated Scr.    1. MISAEL: likely due to CRS. MISAEL resolved. UA bland. FeUrea low. Renal US and CT with L renal solid lesion with urology following for which patient will likely need outpatient open MRI. Avoid nephrotoxins.   2. CKD-3b: Baseline Scr appears to be 1.4. UA bland without proteinuria. Outpatient CKD work up. Monitor electrolytes.  3. HTN with CKD: BP controlled with negative orthostatics. Holding lisinopril. Monitor BP.  4. LE edema: With congestion on CXR s/p bumex 2 mg IV X 1 dose with good UO. Continue with bumex 2 mg IV twice daily for now. TTE unable to visualize LV. Monitor UO.  5. Hyponatremia: In setting of HF. Serum Na acceptable. Monitor serum Na.   6. Anemia: With iron deficiency and patient occult positive s/p venofer 200 mg x 3 doses and Epo on 4/10. Monitor Hb.      [facility] NEPHROLOGY  Rodrigue Amador M.D.  LUIS ALBERTO EnglishO.  Ana Song M.D.  Lucrecia López, MSN, ANP-C    Telephone: [phone]  Facsimile: [phone]    [address] [place] [address]

## 2021-04-15 LAB
ANION GAP SERPL CALC-SCNC: 10 MMOL/L — SIGNIFICANT CHANGE UP (ref 7–14)
APTT BLD: 49.2 SEC — HIGH (ref 27–36.3)
BASOPHILS # BLD AUTO: 0.04 K/UL — SIGNIFICANT CHANGE UP (ref 0–0.2)
BASOPHILS NFR BLD AUTO: 0.5 % — SIGNIFICANT CHANGE UP (ref 0–2)
BUN SERPL-MCNC: 65 MG/DL — HIGH (ref 7–23)
CALCIUM SERPL-MCNC: 10.2 MG/DL — SIGNIFICANT CHANGE UP (ref 8.4–10.5)
CHLORIDE SERPL-SCNC: 91 MMOL/L — LOW (ref 98–107)
CO2 SERPL-SCNC: 34 MMOL/L — HIGH (ref 22–31)
CREAT SERPL-MCNC: 1.71 MG/DL — HIGH (ref 0.5–1.3)
EOSINOPHIL # BLD AUTO: 0.17 K/UL — SIGNIFICANT CHANGE UP (ref 0–0.5)
EOSINOPHIL NFR BLD AUTO: 2.2 % — SIGNIFICANT CHANGE UP (ref 0–6)
GLUCOSE SERPL-MCNC: 148 MG/DL — HIGH (ref 70–99)
HCT VFR BLD CALC: 28.4 % — LOW (ref 34.5–45)
HCT VFR BLD CALC: 28.8 % — LOW (ref 34.5–45)
HGB BLD-MCNC: 8.3 G/DL — LOW (ref 11.5–15.5)
HGB BLD-MCNC: 8.6 G/DL — LOW (ref 11.5–15.5)
IANC: 5.37 K/UL — SIGNIFICANT CHANGE UP (ref 1.5–8.5)
IMM GRANULOCYTES NFR BLD AUTO: 1 % — SIGNIFICANT CHANGE UP (ref 0–1.5)
INR BLD: 2.32 RATIO — HIGH (ref 0.88–1.16)
LYMPHOCYTES # BLD AUTO: 1.55 K/UL — SIGNIFICANT CHANGE UP (ref 1–3.3)
LYMPHOCYTES # BLD AUTO: 19.7 % — SIGNIFICANT CHANGE UP (ref 13–44)
MAGNESIUM SERPL-MCNC: 1.8 MG/DL — SIGNIFICANT CHANGE UP (ref 1.6–2.6)
MCHC RBC-ENTMCNC: 27.6 PG — SIGNIFICANT CHANGE UP (ref 27–34)
MCHC RBC-ENTMCNC: 28.2 PG — SIGNIFICANT CHANGE UP (ref 27–34)
MCHC RBC-ENTMCNC: 29.2 GM/DL — LOW (ref 32–36)
MCHC RBC-ENTMCNC: 29.9 GM/DL — LOW (ref 32–36)
MCV RBC AUTO: 94.4 FL — SIGNIFICANT CHANGE UP (ref 80–100)
MCV RBC AUTO: 94.4 FL — SIGNIFICANT CHANGE UP (ref 80–100)
MONOCYTES # BLD AUTO: 0.66 K/UL — SIGNIFICANT CHANGE UP (ref 0–0.9)
MONOCYTES NFR BLD AUTO: 8.4 % — SIGNIFICANT CHANGE UP (ref 2–14)
NEUTROPHILS # BLD AUTO: 5.37 K/UL — SIGNIFICANT CHANGE UP (ref 1.8–7.4)
NEUTROPHILS NFR BLD AUTO: 68.2 % — SIGNIFICANT CHANGE UP (ref 43–77)
NRBC # BLD: 0 /100 WBCS — SIGNIFICANT CHANGE UP
NRBC # BLD: 0 /100 WBCS — SIGNIFICANT CHANGE UP
NRBC # FLD: 0 K/UL — SIGNIFICANT CHANGE UP
NRBC # FLD: 0 K/UL — SIGNIFICANT CHANGE UP
PHOSPHATE SERPL-MCNC: 3.1 MG/DL — SIGNIFICANT CHANGE UP (ref 2.5–4.5)
PLATELET # BLD AUTO: 307 K/UL — SIGNIFICANT CHANGE UP (ref 150–400)
PLATELET # BLD AUTO: 327 K/UL — SIGNIFICANT CHANGE UP (ref 150–400)
POTASSIUM SERPL-MCNC: 4.5 MMOL/L — SIGNIFICANT CHANGE UP (ref 3.5–5.3)
POTASSIUM SERPL-SCNC: 4.5 MMOL/L — SIGNIFICANT CHANGE UP (ref 3.5–5.3)
PROTHROM AB SERPL-ACNC: 25.6 SEC — HIGH (ref 10.6–13.6)
RBC # BLD: 3.01 M/UL — LOW (ref 3.8–5.2)
RBC # BLD: 3.05 M/UL — LOW (ref 3.8–5.2)
RBC # FLD: 17.3 % — HIGH (ref 10.3–14.5)
RBC # FLD: 17.6 % — HIGH (ref 10.3–14.5)
SODIUM SERPL-SCNC: 135 MMOL/L — SIGNIFICANT CHANGE UP (ref 135–145)
WBC # BLD: 7.84 K/UL — SIGNIFICANT CHANGE UP (ref 3.8–10.5)
WBC # BLD: 7.87 K/UL — SIGNIFICANT CHANGE UP (ref 3.8–10.5)
WBC # FLD AUTO: 7.84 K/UL — SIGNIFICANT CHANGE UP (ref 3.8–10.5)
WBC # FLD AUTO: 7.87 K/UL — SIGNIFICANT CHANGE UP (ref 3.8–10.5)

## 2021-04-15 RX ORDER — HEPARIN SODIUM 5000 [USP'U]/ML
8500 INJECTION INTRAVENOUS; SUBCUTANEOUS EVERY 6 HOURS
Refills: 0 | Status: DISCONTINUED | OUTPATIENT
Start: 2021-04-15 | End: 2021-04-16

## 2021-04-15 RX ORDER — HEPARIN SODIUM 5000 [USP'U]/ML
4000 INJECTION INTRAVENOUS; SUBCUTANEOUS EVERY 6 HOURS
Refills: 0 | Status: DISCONTINUED | OUTPATIENT
Start: 2021-04-15 | End: 2021-04-16

## 2021-04-15 RX ORDER — WARFARIN SODIUM 2.5 MG/1
7.5 TABLET ORAL ONCE
Refills: 0 | Status: COMPLETED | OUTPATIENT
Start: 2021-04-15 | End: 2021-04-15

## 2021-04-15 RX ORDER — HEPARIN SODIUM 5000 [USP'U]/ML
750 INJECTION INTRAVENOUS; SUBCUTANEOUS
Qty: 25000 | Refills: 0 | Status: DISCONTINUED | OUTPATIENT
Start: 2021-04-15 | End: 2021-04-16

## 2021-04-15 RX ORDER — BUMETANIDE 0.25 MG/ML
2 INJECTION INTRAMUSCULAR; INTRAVENOUS EVERY 12 HOURS
Refills: 0 | Status: DISCONTINUED | OUTPATIENT
Start: 2021-04-15 | End: 2021-04-16

## 2021-04-15 RX ADMIN — Medication 3 MILLILITER(S): at 09:14

## 2021-04-15 RX ADMIN — HEPARIN SODIUM 4000 UNIT(S): 5000 INJECTION INTRAVENOUS; SUBCUTANEOUS at 17:47

## 2021-04-15 RX ADMIN — Medication 100 MILLIGRAM(S): at 10:27

## 2021-04-15 RX ADMIN — PANTOPRAZOLE SODIUM 40 MILLIGRAM(S): 20 TABLET, DELAYED RELEASE ORAL at 05:41

## 2021-04-15 RX ADMIN — HEPARIN SODIUM 750 UNIT(S)/HR: 5000 INJECTION INTRAVENOUS; SUBCUTANEOUS at 10:26

## 2021-04-15 RX ADMIN — SIMVASTATIN 10 MILLIGRAM(S): 20 TABLET, FILM COATED ORAL at 22:48

## 2021-04-15 RX ADMIN — HEPARIN SODIUM 950 UNIT(S)/HR: 5000 INJECTION INTRAVENOUS; SUBCUTANEOUS at 17:47

## 2021-04-15 RX ADMIN — BUDESONIDE AND FORMOTEROL FUMARATE DIHYDRATE 2 PUFF(S): 160; 4.5 AEROSOL RESPIRATORY (INHALATION) at 10:25

## 2021-04-15 RX ADMIN — WARFARIN SODIUM 7.5 MILLIGRAM(S): 2.5 TABLET ORAL at 17:09

## 2021-04-15 RX ADMIN — Medication 3 MILLILITER(S): at 20:48

## 2021-04-15 RX ADMIN — BUDESONIDE AND FORMOTEROL FUMARATE DIHYDRATE 2 PUFF(S): 160; 4.5 AEROSOL RESPIRATORY (INHALATION) at 22:47

## 2021-04-15 RX ADMIN — BUMETANIDE 2 MILLIGRAM(S): 0.25 INJECTION INTRAMUSCULAR; INTRAVENOUS at 05:41

## 2021-04-15 RX ADMIN — BUMETANIDE 2 MILLIGRAM(S): 0.25 INJECTION INTRAMUSCULAR; INTRAVENOUS at 17:08

## 2021-04-15 NOTE — PROGRESS NOTE ADULT - ASSESSMENT
70y Female with history of obesity, severe MR and TR s/p repair, COPD on home O2 presents with dyspnea. Nephrology consulted for elevated Scr.    1. MISAEL: likely due to CRS initially resolved with mild rise in Scr this morning in setting of diuresis. Plan to change diuretics to PO. UA bland. FeUrea low. Renal US and CT with L renal solid lesion with urology following for which patient will likely need outpatient open MRI. Avoid nephrotoxins.   2. CKD-3b: Baseline Scr appears to be 1.4. UA bland without proteinuria. Outpatient CKD work up. Monitor electrolytes.  3. HTN with CKD: BP controlled with negative orthostatics. Holding lisinopril. Monitor BP.  4. LE edema: Improving on bumex 2 mg IV twice daily. Can change to bumex 2 mg PO twice daily. TTE unable to visualize LV. Monitor UO.  5. Hyponatremia: In setting of HF now resolved with diuresis. Monitor serum Na.   6. Anemia: With iron deficiency and patient occult positive s/p venofer 200 mg x 3 doses and Epo on 4/10. Monitor Hb.      [facility] NEPHROLOGY  Rodrigue Amador M.D.  Rob Perez D.O.  Ana Song M.D.  Lucrecia López, MSN, ANP-C    Telephone: [phone]  Facsimile: [phone]    [address] [place] [address]

## 2021-04-15 NOTE — PROGRESS NOTE ADULT - ASSESSMENT
hgb is stalbe  despite guiac wwould opt for conservive mangemnt  based on age and comvoidies, follow h/h continue ac

## 2021-04-16 ENCOUNTER — TRANSCRIPTION ENCOUNTER (OUTPATIENT)
Age: 71
End: 2021-04-16

## 2021-04-16 VITALS
OXYGEN SATURATION: 100 % | DIASTOLIC BLOOD PRESSURE: 62 MMHG | SYSTOLIC BLOOD PRESSURE: 119 MMHG | HEART RATE: 65 BPM | RESPIRATION RATE: 18 BRPM

## 2021-04-16 LAB
ANION GAP SERPL CALC-SCNC: 11 MMOL/L — SIGNIFICANT CHANGE UP (ref 7–14)
APTT BLD: 59.2 SEC — HIGH (ref 27–36.3)
APTT BLD: 89.8 SEC — HIGH (ref 27–36.3)
BUN SERPL-MCNC: 73 MG/DL — HIGH (ref 7–23)
CALCIUM SERPL-MCNC: 10.2 MG/DL — SIGNIFICANT CHANGE UP (ref 8.4–10.5)
CHLORIDE SERPL-SCNC: 92 MMOL/L — LOW (ref 98–107)
CO2 SERPL-SCNC: 33 MMOL/L — HIGH (ref 22–31)
CREAT SERPL-MCNC: 1.74 MG/DL — HIGH (ref 0.5–1.3)
GLUCOSE SERPL-MCNC: 102 MG/DL — HIGH (ref 70–99)
HCT VFR BLD CALC: 29.3 % — LOW (ref 34.5–45)
HGB BLD-MCNC: 8.6 G/DL — LOW (ref 11.5–15.5)
INR BLD: 2.56 RATIO — HIGH (ref 0.88–1.16)
INR BLD: 2.73 RATIO — HIGH (ref 0.88–1.16)
MAGNESIUM SERPL-MCNC: 1.9 MG/DL — SIGNIFICANT CHANGE UP (ref 1.6–2.6)
MCHC RBC-ENTMCNC: 27.7 PG — SIGNIFICANT CHANGE UP (ref 27–34)
MCHC RBC-ENTMCNC: 29.4 GM/DL — LOW (ref 32–36)
MCV RBC AUTO: 94.2 FL — SIGNIFICANT CHANGE UP (ref 80–100)
NRBC # BLD: 0 /100 WBCS — SIGNIFICANT CHANGE UP
NRBC # FLD: 0 K/UL — SIGNIFICANT CHANGE UP
PHOSPHATE SERPL-MCNC: 3.8 MG/DL — SIGNIFICANT CHANGE UP (ref 2.5–4.5)
PLATELET # BLD AUTO: 304 K/UL — SIGNIFICANT CHANGE UP (ref 150–400)
POTASSIUM SERPL-MCNC: 4.4 MMOL/L — SIGNIFICANT CHANGE UP (ref 3.5–5.3)
POTASSIUM SERPL-SCNC: 4.4 MMOL/L — SIGNIFICANT CHANGE UP (ref 3.5–5.3)
PROTHROM AB SERPL-ACNC: 28.1 SEC — HIGH (ref 10.6–13.6)
PROTHROM AB SERPL-ACNC: 29.9 SEC — HIGH (ref 10.6–13.6)
RBC # BLD: 3.11 M/UL — LOW (ref 3.8–5.2)
RBC # FLD: 17.5 % — HIGH (ref 10.3–14.5)
SODIUM SERPL-SCNC: 136 MMOL/L — SIGNIFICANT CHANGE UP (ref 135–145)
WBC # BLD: 7.19 K/UL — SIGNIFICANT CHANGE UP (ref 3.8–10.5)
WBC # FLD AUTO: 7.19 K/UL — SIGNIFICANT CHANGE UP (ref 3.8–10.5)

## 2021-04-16 PROCEDURE — 99232 SBSQ HOSP IP/OBS MODERATE 35: CPT

## 2021-04-16 RX ORDER — ERYTHROPOIETIN 10000 [IU]/ML
10000 INJECTION, SOLUTION INTRAVENOUS; SUBCUTANEOUS ONCE
Refills: 0 | Status: COMPLETED | OUTPATIENT
Start: 2021-04-16 | End: 2021-04-16

## 2021-04-16 RX ORDER — WARFARIN SODIUM 2.5 MG/1
1 TABLET ORAL
Qty: 30 | Refills: 0
Start: 2021-04-16 | End: 2021-05-15

## 2021-04-16 RX ORDER — WARFARIN SODIUM 2.5 MG/1
7.5 TABLET ORAL ONCE
Refills: 0 | Status: COMPLETED | OUTPATIENT
Start: 2021-04-16 | End: 2021-04-16

## 2021-04-16 RX ORDER — FUROSEMIDE 40 MG
1 TABLET ORAL
Qty: 0 | Refills: 0 | DISCHARGE

## 2021-04-16 RX ORDER — DIGOXIN 250 MCG
1 TABLET ORAL
Qty: 0 | Refills: 0 | DISCHARGE

## 2021-04-16 RX ORDER — PANTOPRAZOLE SODIUM 20 MG/1
1 TABLET, DELAYED RELEASE ORAL
Qty: 30 | Refills: 0
Start: 2021-04-16 | End: 2021-05-15

## 2021-04-16 RX ORDER — BUMETANIDE 0.25 MG/ML
1 INJECTION INTRAMUSCULAR; INTRAVENOUS
Qty: 60 | Refills: 0
Start: 2021-04-16 | End: 2021-05-15

## 2021-04-16 RX ORDER — BUMETANIDE 0.25 MG/ML
1 INJECTION INTRAMUSCULAR; INTRAVENOUS EVERY 12 HOURS
Refills: 0 | Status: DISCONTINUED | OUTPATIENT
Start: 2021-04-16 | End: 2021-04-16

## 2021-04-16 RX ADMIN — BUMETANIDE 1 MILLIGRAM(S): 0.25 INJECTION INTRAMUSCULAR; INTRAVENOUS at 17:27

## 2021-04-16 RX ADMIN — Medication 100 MILLIGRAM(S): at 17:27

## 2021-04-16 RX ADMIN — BUDESONIDE AND FORMOTEROL FUMARATE DIHYDRATE 2 PUFF(S): 160; 4.5 AEROSOL RESPIRATORY (INHALATION) at 09:33

## 2021-04-16 RX ADMIN — Medication 3 MILLILITER(S): at 08:20

## 2021-04-16 RX ADMIN — BUMETANIDE 2 MILLIGRAM(S): 0.25 INJECTION INTRAMUSCULAR; INTRAVENOUS at 06:06

## 2021-04-16 RX ADMIN — ERYTHROPOIETIN 10000 UNIT(S): 10000 INJECTION, SOLUTION INTRAVENOUS; SUBCUTANEOUS at 13:42

## 2021-04-16 RX ADMIN — WARFARIN SODIUM 7.5 MILLIGRAM(S): 2.5 TABLET ORAL at 17:27

## 2021-04-16 RX ADMIN — PANTOPRAZOLE SODIUM 40 MILLIGRAM(S): 20 TABLET, DELAYED RELEASE ORAL at 06:06

## 2021-04-16 RX ADMIN — HEPARIN SODIUM 950 UNIT(S)/HR: 5000 INJECTION INTRAVENOUS; SUBCUTANEOUS at 02:42

## 2021-04-16 NOTE — PROGRESS NOTE ADULT - NSICDXPILOT_GEN_ALL_CORE
[place]

## 2021-04-16 NOTE — DISCHARGE NOTE NURSING/CASE MANAGEMENT/SOCIAL WORK - NSDCFUADDAPPT_GEN_ALL_CORE_FT
Follow up with urology within 1-2 weeks of discharge at [facility]  [address] [place] [address], [place] [address], [phone]    If you are in need of a general medicine physician and post-discharge medical follow-up for further care/recommendations you may contact the [facility] for an appointment [phone]

## 2021-04-16 NOTE — DISCHARGE NOTE NURSING/CASE MANAGEMENT/SOCIAL WORK - PATIENT PORTAL LINK FT
You can access the FollowMyHealth Patient Portal offered by [facility] by registering at the following website: [email or web address]. By joining [employer]’s FollowMyHealth portal, you will also be able to view your health information using other applications (apps) compatible with our system.

## 2021-04-16 NOTE — PROGRESS NOTE ADULT - ASSESSMENT
70y Female with history of obesity, severe MR and TR s/p repair, COPD on home O2 presents with dyspnea. Nephrology consulted for elevated Scr.    1. MISAEL: likely due to CRS. Scr stable and mildly above baseline. UA bland. FeUrea low. Renal US and CT with L renal solid lesion with urology following for which patient will likely need outpatient open MRI. Avoid nephrotoxins.   2. CKD-3b: Baseline Scr appears to be 1.4. UA bland without proteinuria. Outpatient CKD work up. Monitor electrolytes.  3. HTN with CKD: BP controlled with negative orthostatics. Holding lisinopril. Monitor BP.  4. LE edema: Improving on bumex 2 mg PO twice daily (1.9L UO). TTE unable to visualize LV. Monitor UO.  5. Hyponatremia: In setting of HF now resolved with diuresis. Monitor serum Na.   6. Anemia: With iron deficiency and patient occult positive s/p venofer 200 mg x 3 doses and Epo on 4/10. Will give another dose today. Monitor Hb.      [facility] NEPHROLOGY  Rodrigue Amador M.D.  Rob Perez D.O.  Ana Song M.D.  Lucrecia López, MSN, ANP-C    Telephone: [phone]  Facsimile: [phone]    [address] [place] [address]   70y Female with history of obesity, severe MR and TR s/p repair, COPD on home O2 presents with dyspnea. Nephrology consulted for elevated Scr.    1. MISAEL: likely due to CRS. Scr stable and mildly above baseline. UA bland. FeUrea low. Renal US and CT with L renal solid lesion with urology following for which patient will likely need outpatient open MRI. Avoid nephrotoxins.   2. CKD-3b: Baseline Scr appears to be 1.4. UA bland without proteinuria. Outpatient CKD work up. Monitor electrolytes.  3. HTN with CKD: BP controlled with negative orthostatics. Holding lisinopril. Monitor BP.  4. LE edema: Improving on bumex 2 mg PO twice daily (1.9L UO). Decrease to 1 mg twice daily given azotemia and improving volume status. TTE unable to visualize LV. Monitor UO.  5. Hyponatremia: In setting of HF now resolved with diuresis. Monitor serum Na.   6. Anemia: With iron deficiency and patient occult positive s/p venofer 200 mg x 3 doses and Epo on 4/10. Will give another dose today. Monitor Hb.      [facility] NEPHROLOGY  Rodrigue Amador M.D.  Rob Perez D.O.  Ana Song M.D.  Lucrecia López, MSN, ANP-C    Telephone: [phone]  Facsimile: [phone]    [address] [place] [id number]

## 2021-04-16 NOTE — PROGRESS NOTE ADULT - SUBJECTIVE AND OBJECTIVE BOX
[facility] NEPHROLOGY- PROGRESS NOTE    70y Female with history of obesity, severe MR and TR s/p repair, COPD on home O2 presents with dyspnea. Nephrology consulted for elevated Scr.    REVIEW OF SYSTEMS:  Gen: no fevers  Cards: no chest pain  Resp: + dyspnea improving  GI: no nausea or vomiting or diarrhea  Vascular: + LE edema resolved    No Known Allergies      Hospital Medications: Medications reviewed      VITALS:  T(F): 97.7 (21 @ 10:15), Max: 98 (21 @ 19:30)  HR: 71 (21 @ 10:15)  BP: 116/55 (21 @ 10:15)  RR: 18 (21 @ 10:15)  SpO2: 100% (21 @ 10:15)  Wt(kg): --     @ 07:01  -   @ 07:00  --------------------------------------------------------  IN: 1104 mL / OUT: 1700 mL / NET: -596 mL      PHYSICAL EXAM:    Gen: NAD, calm, obese  Cards: RRR, +S1/S2, no M/G/R  Resp: CTA B/L  GI: soft, NT/ND, NABS  Vascular: no LE edema B/L      LABS:      136  |  97<L>  |  74<H>  ----------------------------<  102<H>  5.1   |  32<H>  |  1.72<H>    Ca    9.5      2021 06:27  Phos  3.8       Mg     2.4           Creatinine Trend: 1.72 <--, 1.81 <--, 1.96 <--, 2.07 <--, 1.84 <--, 1.79 <--, 1.78 <--, 1.63 <--                        7.2    6.52  )-----------( 264      ( 2021 06:27 )             25.4     Urine Studies:  Urinalysis Basic - ( 2021 09:47 )    Color: Light Yellow / Appearance: Clear / S.012 / pH:   Gluc:  / Ketone: Negative  / Bili: Negative / Urobili: <2 mg/dL   Blood:  / Protein: Negative / Nitrite: Negative   Leuk Esterase: Negative / RBC:  / WBC    Sq Epi:  / Non Sq Epi:  / Bacteria:       Osmolality, Random Urine: 347 mosm/kg ( @ 13:33)  Sodium, Random Urine: 47 mmol/L ( @ 13:33)  Creatinine, Random Urine: 58 mg/dL ( @ 13:33)  Sodium, Random Urine: 72 mmol/L ( @ 09:47)  Potassium, Random Urine: 33.9 mmol/L ( @ :47)  Chloride, Random Urine: 53 mmol/L ( @ :47)  Creatinine, Random Urine: 56 mg/dL ( @ 09:47)        < from: US Kidney and Bladder (21 @ 07:46) >  IMPRESSION:    *  No hydronephrosis.  *  Solid lesion in the upper pole of the left kidney measuring up to 3.5 cm, for which further evaluation with CT scan or MRI with and without contrast is recommended.    < end of copied text >  
[facility] NEPHROLOGY- PROGRESS NOTE    70y Female with history of obesity, severe MR and TR s/p repair, COPD on home O2 presents with dyspnea. Nephrology consulted for elevated Scr.    REVIEW OF SYSTEMS:    Gen: no fevers  Cards: no chest pain  Resp: + dyspnea on exertion  GI: no nausea or vomiting or diarrhea  Vascular: + LE edema resolved    No Known Allergies      Hospital Medications: Medications reviewed      VITALS:  T(F): 97.5 (04-13-21 @ 05:34), Max: 98.1 (04-12-21 @ 13:30)  HR: 66 (04-13-21 @ 10:14)  BP: 131/64 (04-13-21 @ 05:34)  RR: 17 (04-13-21 @ 05:34)  SpO2: 99% (04-13-21 @ 10:14)  Wt(kg): --    04-12 @ 07:01  -  04-13 @ 07:00  --------------------------------------------------------  IN: 570 mL / OUT: 1875 mL / NET: -1305 mL      PHYSICAL EXAM:  Gen: NAD, calm, obese  Cards: RRR, +S1/S2, no M/G/R  Resp: CTA B/L  GI: soft, NT/ND, NABS  Vascular: no LE edema B/L      LABS:  04-13    133<L>  |  92<L>  |  61<H>  ----------------------------<  99  5.1   |  37<H>  |  1.44<H>    Ca    10.3      13 Apr 2021 08:23  Phos  3.3     04-13  Mg     2.0     04-13      Creatinine Trend: 1.44 <--, 1.55 <--, 1.58 <--, 1.56 <--, 1.70 <--, 1.71 <--, 1.72 <--                        8.0    6.76  )-----------( 279      ( 13 Apr 2021 08:23 )             28.1     Urine Studies:              
[facility] NEPHROLOGY- PROGRESS NOTE    70y Female with history of obesity, severe MR and TR s/p repair, COPD on home O2 presents with dyspnea. Nephrology consulted for elevated Scr.    REVIEW OF SYSTEMS:  Gen: no fevers  Cards: no chest pain  Resp: + dyspnea improving  GI: no nausea or vomiting or diarrhea  Vascular: + LE edema improving    No Known Allergies      Hospital Medications: Medications reviewed        VITALS:  T(F): 98.4 (04-16-21 @ 06:05), Max: 99.1 (04-15-21 @ 17:06)  HR: 69 (04-16-21 @ 08:21)  BP: 112/64 (04-16-21 @ 06:05)  RR: 17 (04-16-21 @ 06:05)  SpO2: 98% (04-16-21 @ 08:21)  Wt(kg): --    04-15 @ 07:01  -  04-16 @ 07:00  --------------------------------------------------------  IN: 1048 mL / OUT: 1900 mL / NET: -852 mL        PHYSICAL EXAM:  Gen: NAD, calm, obese  Cards: RRR, +S1/S2, no M/G/R  Resp: CTA B/L  GI: soft, NT/ND, NABS  Vascular: no LE edema B/L      LABS:  04-16    136  |  92<L>  |  73<H>  ----------------------------<  102<H>  4.4   |  33<H>  |  1.74<H>    Ca    10.2      16 Apr 2021 08:38  Phos  3.8     04-16  Mg     1.9     04-16      Creatinine Trend: 1.74 <--, 1.71 <--, 1.53 <--, 1.44 <--, 1.55 <--, 1.58 <--, 1.56 <--                        8.6    7.19  )-----------( 304      ( 16 Apr 2021 08:38 )             29.3     Urine Studies:                        
PULMONARY PROGRESS NOTE    DAMARI GUERRERO  MRN-[id number]    Patient is a 70y old  Female who presents with a chief complaint of SOB (05 Apr 2021 10:00)      HPI:   comfortable on 3L  does not want to try NIVV    ROS:   -    ACTIVE MEDICATION LIST:  MEDICATIONS  (STANDING):  albuterol/ipratropium for Nebulization 3 milliLiter(s) Nebulizer every 6 hours  allopurinol 100 milliGRAM(s) Oral daily  budesonide 160 MICROgram(s)/formoterol 4.5 MICROgram(s) Inhaler 2 Puff(s) Inhalation two times a day  furosemide   Injectable 40 milliGRAM(s) IV Push two times a day  heparin  Infusion. 1300 Unit(s)/Hr (13 mL/Hr) IV Continuous <Continuous>  pantoprazole    Tablet 40 milliGRAM(s) Oral before breakfast  simvastatin 10 milliGRAM(s) Oral at bedtime  warfarin 4 milliGRAM(s) Oral once    MEDICATIONS  (PRN):  aluminum hydroxide/magnesium hydroxide/simethicone Suspension 30 milliLiter(s) Oral every 6 hours PRN Dyspepsia  heparin   Injectable 8500 Unit(s) IV Push every 6 hours PRN For aPTT less than 40  heparin   Injectable 4000 Unit(s) IV Push every 6 hours PRN For aPTT between 40 - 57  sodium chloride 0.65% Nasal 2 Spray(s) Both Nostrils four times a day PRN Nasal Congestion      EXAM:  Vital Signs Last 24 Hrs  T(C): 36.8 (05 Apr 2021 13:17), Max: 36.8 (04 Apr 2021 22:23)  T(F): 98.2 (05 Apr 2021 13:17), Max: 98.2 (04 Apr 2021 22:23)  HR: 71 (05 Apr 2021 15:38) (63 - 80)  BP: 121/62 (05 Apr 2021 13:17) (108/53 - 128/55)  BP(mean): --  RR: 16 (05 Apr 2021 13:17) (16 - 19)  SpO2: 98% (05 Apr 2021 13:17) (98% - 100%)    GENERAL: The patient is awake and alert in no apparent distress.     LUNGS: Clear to auscultation without wheezing, rales or rhonchi; respirations unlabored    HEART: Regular rate and rhythm without murmur.                            7.4    6.74  )-----------( 265      ( 05 Apr 2021 07:53 )             25.2       04-05    134<L>  |  93<L>  |  79<H>  ----------------------------<  102<H>  4.6   |  33<H>  |  1.96<H>    Ca    9.2      05 Apr 2021 07:53  Mg     2.4     04-05       r< from: Xray Chest 1 View- PORTABLE-Urgent (04.01.21 @ 03:56) >    EXAM:  XR CHEST PORTABLE URGENT 1V        PROCEDURE DATE:  Apr 1 2021         INTERPRETATION:  CLINICAL INFORMATION: Chest pain.    EXAM: Frontal radiograph of the chest.    COMPARISON: Chest radiograph from 1/11/2021    FINDINGS:  Cardiomegaly. Status post mitral valve repair.    Bilateral perihilar opacities, likely representing pulmonary edema.    Sternotomy.    IMPRESSION: Bilateral perihilar opacities with cardiomegaly similar to the last exam consistent with CHF.            CHAPIS MORAES MD; Resident Radiology  This document has been electronically signed.  DELIA PEDERSEN MD; Attending Radiologist  This document has been electronically signed. Apr 1 2021  5:38AM    < end of copied text >      PROBLEM LIST:  70y Female with HEALTH ISSUES - PROBLEM Dx:  Acute on chronic heart failure with normal ejection fraction  Acute on chronic heart failure with normal ejection fraction    Bradycardia  Bradycardia    Anemia  Anemia    HTN (hypertension)  HTN (hypertension)    Mitral valve replaced  Mitral valve replaced    COPD (chronic obstructive pulmonary disease)  COPD (chronic obstructive pulmonary disease)    Stage 3 chronic kidney disease, unspecified whether stage 3a or 3b CKD  Stage 3 chronic kidney disease, unspecified whether stage 3a or 3b CKD    Need for prophylactic measure  Need for prophylactic measure           RECS:  discussed need for NIVV whenever sleeping  she will continue to think about  continue inhalers  f/u cardio    Please call with any questions.    Irma Eaton DO  [facility] Pulmonary/Sleep Medicine  [phone]  
PULMONARY PROGRESS NOTE    DAMARI GUERRERO  MRN-[id number]    Patient is a 70y old  Female who presents with a chief complaint of SOB (08 Apr 2021 09:02)      HPI:  receiving her prbc  on 2L  comfortable    ROS:   -    ACTIVE MEDICATION LIST:  MEDICATIONS  (STANDING):  albuterol/ipratropium for Nebulization 3 milliLiter(s) Nebulizer every 6 hours  allopurinol 100 milliGRAM(s) Oral daily  budesonide 160 MICROgram(s)/formoterol 4.5 MICROgram(s) Inhaler 2 Puff(s) Inhalation two times a day  buMETAnide 2 milliGRAM(s) Oral daily  heparin  Infusion. 1300 Unit(s)/Hr (13 mL/Hr) IV Continuous <Continuous>  iron sucrose IVPB 200 milliGRAM(s) IV Intermittent <User Schedule>  pantoprazole    Tablet 40 milliGRAM(s) Oral before breakfast  simvastatin 10 milliGRAM(s) Oral at bedtime    MEDICATIONS  (PRN):  aluminum hydroxide/magnesium hydroxide/simethicone Suspension 30 milliLiter(s) Oral every 6 hours PRN Dyspepsia  heparin   Injectable 4000 Unit(s) IV Push every 6 hours PRN For aPTT between 40 - 57  heparin   Injectable 8500 Unit(s) IV Push every 6 hours PRN For aPTT less than 40  sodium chloride 0.65% Nasal 2 Spray(s) Both Nostrils four times a day PRN Nasal Congestion      EXAM:  Vital Signs Last 24 Hrs  T(C): 36.8 (08 Apr 2021 09:43), Max: 36.8 (07 Apr 2021 17:20)  T(F): 98.3 (08 Apr 2021 09:43), Max: 98.3 (08 Apr 2021 09:43)  HR: 80 (08 Apr 2021 15:38) (60 - 81)  BP: 156/80 (08 Apr 2021 09:43) (112/41 - 156/80)  BP(mean): --  RR: 18 (08 Apr 2021 09:43) (17 - 18)  SpO2: 96% (08 Apr 2021 15:38) (96% - 100%)    GENERAL: The patient is awake and alert in no apparent distress.     LUNGS: Clear to auscultation without wheezing, rales or rhonchi; respirations unlabored    HEART: Regular rate and rhythm without murmur.                            7.0    5.93  )-----------( 277      ( 08 Apr 2021 06:15 )             24.3       04-08    134<L>  |  95<L>  |  73<H>  ----------------------------<  123<H>  5.0   |  32<H>  |  1.71<H>    Ca    9.4      08 Apr 2021 06:15  Phos  4.1     04-08  Mg     2.3     04-08     rad< from: US Kidney and Bladder (04.06.21 @ 07:46) >    EXAM:  US KIDNEYS AND BLADDER        PROCEDURE DATE:  Apr 6 2021         INTERPRETATION:  CLINICAL INFORMATION: Acute kidney injury.    COMPARISON: None available.    TECHNIQUE: Sonography of the kidneys and bladder.    FINDINGS:    Right kidney: 10.1 cm. No renal mass, hydronephrosis or calculi.    Left kidney: 10.9 cm. No hydronephrosis or calculi. Solid upper pole lesion measures 3.5 x 3.0 x 3.2 cm.    Urinary bladder: Not distended at the time of exam.    IMPRESSION:    *  No hydronephrosis.  *  Solid lesion in the upper pole of the left kidney measuring up to 3.5 cm, for which further evaluation with CT scan or MRI with and without contrast is recommended.                    LESLEY CALLEJAS MD; Attending Radiologist  This document has been electronically signed. Apr 6 2021  8:56AM    < end of copied text >  < from: Xray Chest 1 View- PORTABLE-Urgent (04.01.21 @ 03:56) >    EXAM:  XR CHEST PORTABLE URGENT 1V        PROCEDURE DATE:  Apr 1 2021         INTERPRETATION:  CLINICAL INFORMATION: Chest pain.    EXAM: Frontal radiograph of the chest.    COMPARISON: Chest radiograph from 1/11/2021    FINDINGS:  Cardiomegaly. Status post mitral valve repair.    Bilateral perihilar opacities, likely representing pulmonary edema.    Sternotomy.    IMPRESSION: Bilateral perihilar opacities with cardiomegaly similar to the last exam consistent with CHF.            CHAPIS MORAES MD; Resident Radiology  This document has been electronically signed.  DELIA PEDERSEN MD; Attending Radiologist  This document has been electronically signed. Apr 1 2021  5:38AM    < end of copied text >      PROBLEM LIST:  70y Female with HEALTH ISSUES - PROBLEM Dx:  Acute on chronic heart failure with normal ejection fraction  Acute on chronic heart failure with normal ejection fraction    Bradycardia  Bradycardia    Anemia  Anemia    HTN (hypertension)  HTN (hypertension)    Mitral valve replaced  Mitral valve replaced    COPD (chronic obstructive pulmonary disease)  COPD (chronic obstructive pulmonary disease)    Stage 3 chronic kidney disease, unspecified whether stage 3a or 3b CKD  Stage 3 chronic kidney disease, unspecified whether stage 3a or 3b CKD    Need for prophylactic measure  Need for prophylactic measure         RECS:  suggest again to be on BPAP while inpatient  she continuest o want to remain on NC  if any resp distress- would place her on BPAP 10/5    Please call with any questions.    Irma Eaton, DO  [facility] Pulmonary/Sleep Medicine  [phone]  
Patient is a 70y Female     Patient is a 70y old  Female who presents with a chief complaint of SOB (11 Apr 2021 11:00)      HPI:  70 y.o. F with PMH of morbid obesity, CAD,  HFpEF (EF 65-60%), severe MR and TR s/p mechanical MV and TV repair (on coumadin), Afib s/p failed ablation, HTN, HLD, HTN, MIGUEL, COPD (on 3L O2), CKD3, gout, anemia (on iron pills) who presents with worsening dyspnea since 4 days prior to admission. Patient reports dyspnea worsening with exertion and orthopnea. No increased cough, no increased sputum production, and patient has remained on her baseline of 3L NC. She also has been reporting intermittent dark stools, which she has attributed to taking PO iron. Patient feels she may have been consuming more liquids than her fluid restriction allows and sometimes took additional Lasix PRN at home. She also endorses epigastric pain that has since resolved. ROS negative for fevers, chills, N/V/D, dysuria, or hematuria.     She currently states she is breathing more comfortably after receiving IV Lasix in ED.  (01 Apr 2021 12:05)      PAST MEDICAL & SURGICAL HISTORY:  Atrial fibrillation  S/P Ablation    Pulmonary hypertension    MIGUEL (obstructive sleep apnea)    COPD (chronic obstructive pulmonary disease)  on home O2    CHF (congestive heart failure)    HTN (hypertension)    Gout    Mitral valve replaced    Tricuspid valve replaced    CHF (congestive heart failure)    Hypertension    COPD (chronic obstructive pulmonary disease)    History of mitral valve replacement with mechanical valve    Tricuspid valve replaced  mechanical    No significant past surgical history        MEDICATIONS  (STANDING):  albuterol/ipratropium for Nebulization 3 milliLiter(s) Nebulizer every 6 hours  allopurinol 100 milliGRAM(s) Oral daily  budesonide 160 MICROgram(s)/formoterol 4.5 MICROgram(s) Inhaler 2 Puff(s) Inhalation two times a day  buMETAnide 2 milliGRAM(s) Oral daily  heparin  Infusion. 1000 Unit(s)/Hr (10 mL/Hr) IV Continuous <Continuous>  pantoprazole    Tablet 40 milliGRAM(s) Oral before breakfast  simvastatin 10 milliGRAM(s) Oral at bedtime  warfarin 6 milliGRAM(s) Oral once      Allergies    No Known Allergies    Intolerances        SOCIAL HISTORY:  Denies ETOh,Smoking,     FAMILY HISTORY:  Family history of hypertension (Father, Sibling)    Family history of stroke    Family history of hypertension (Mother)        REVIEW OF SYSTEMS:    CONSTITUTIONAL: No weakness, fevers or chills  EYES/ENT: No visual changes;  No vertigo or throat pain   NECK: No pain or stiffness  RESPIRATORY: No cough, wheezing, hemoptysis; No shortness of breath  CARDIOVASCULAR: No chest pain or palpitations  GASTROINTESTINAL: No abdominal or epigastric pain. No nausea, vomiting, or hematemesis; No diarrhea or constipation. No melena or hematochezia.  GENITOURINARY: No dysuria, frequency or hematuria  NEUROLOGICAL: No numbness or weakness  SKIN: No itching, burning, rashes, or lesions   All other review of systems is negative unless indicated above.    VITAL:  T(C): , Max: 36.8 (04-11-21 @ 06:48)  T(F): , Max: 98.3 (04-11-21 @ 06:48)  HR: 62 (04-11-21 @ 06:48)  BP: 123/63 (04-11-21 @ 06:48)  BP(mean): --  RR: 17 (04-11-21 @ 06:48)  SpO2: 100% (04-11-21 @ 06:48)  Wt(kg): --    I and O's:    04-09 @ 07:01  -  04-10 @ 07:00  --------------------------------------------------------  IN: 3450 mL / OUT: 700 mL / NET: 2750 mL    04-10 @ 07:01  -  04-11 @ 07:00  --------------------------------------------------------  IN: 478 mL / OUT: 450 mL / NET: 28 mL    04-11 @ 07:01  -  04-11 @ 11:09  --------------------------------------------------------  IN: 476 mL / OUT: 800 mL / NET: -324 mL          PHYSICAL EXAM:    Constitutional: NAD  HEENT: PERRLA,   Neck: No JVD  Respiratory: CTA B/L  Cardiovascular: S1 and S2  Gastrointestinal: BS+, soft, NT/ND  Extremities: No peripheral edema  Neurological: A/O x 3, no focal deficits  Psychiatric: Normal mood, normal affect  : No Junior  Skin: No rashes  Access: Not applicable  Back: No CVA tenderness    LABS:                        7.9    6.39  )-----------( 269      ( 11 Apr 2021 06:49 )             27.4     04-11    134<L>  |  94<L>  |  67<H>  ----------------------------<  104<H>  5.0   |  36<H>  |  1.58<H>    Ca    10.0      11 Apr 2021 06:49  Mg     2.2     04-11            RADIOLOGY & ADDITIONAL STUDIES:                          
[facility] NEPHROLOGY- PROGRESS NOTE    70y Female with history of obesity, severe MR and TR s/p repair, COPD on home O2 presents with dyspnea. Nephrology consulted for elevated Scr.    REVIEW OF SYSTEMS:  Gen: no fevers  Cards: no chest pain  Resp: + dyspnea on exertion  GI: no nausea or vomiting or diarrhea  Vascular: + LE edema improving    No Known Allergies      Hospital Medications: Medications reviewed      VITALS:  T(F): 98.2 (04-15-21 @ 12:52), Max: 99 (04-15-21 @ 05:38)  HR: 73 (04-15-21 @ 12:52)  BP: 126/68 (04-15-21 @ 12:52)  RR: 17 (04-15-21 @ 12:52)  SpO2: 100% (04-15-21 @ 12:52)  Wt(kg): --    04-14 @ 07:01  -  04-15 @ 07:00  --------------------------------------------------------  IN: 120 mL / OUT: 1700 mL / NET: -1580 mL    04-15 @ 07:01  -  04-15 @ 14:52  --------------------------------------------------------  IN: 476 mL / OUT: 0 mL / NET: 476 mL        PHYSICAL EXAM:  Gen: NAD, calm, obese  Cards: RRR, +S1/S2, no M/G/R  Resp: bibasilar rales  GI: soft, NT/ND, NABS  Vascular: no LE edema B/L      LABS:  04-15    135  |  91<L>  |  65<H>  ----------------------------<  148<H>  4.5   |  34<H>  |  1.71<H>    Ca    10.2      15 Apr 2021 06:22  Phos  3.1     04-15  Mg     1.8     04-15      Creatinine Trend: 1.71 <--, 1.53 <--, 1.44 <--, 1.55 <--, 1.58 <--, 1.56 <--, 1.70 <--                        8.3    7.87  )-----------( 307      ( 15 Apr 2021 06:22 )             28.4     Urine Studies:                  
[facility] NEPHROLOGY- PROGRESS NOTE    70y Female with history of obesity, severe MR and TR s/p repair, COPD on home O2 presents with dyspnea. Nephrology consulted for elevated Scr.    REVIEW OF SYSTEMS:  Gen: no fevers  Cards: no chest pain  Resp: + dyspnea on exertion  GI: no nausea or vomiting or diarrhea  Vascular: + LE edema improving    No Known Allergies      Hospital Medications: Medications reviewed      VITALS:  T(F): 98.6 (04-14-21 @ 05:02), Max: 98.9 (04-13-21 @ 15:07)  HR: 70 (04-14-21 @ 08:30)  BP: 134/53 (04-14-21 @ 05:02)  RR: 17 (04-14-21 @ 05:02)  SpO2: 99% (04-14-21 @ 08:30)  Wt(kg): --    04-13 @ 07:01  -  04-14 @ 07:00  --------------------------------------------------------  IN: 450 mL / OUT: 2850 mL / NET: -2400 mL    04-14 @ 07:01  -  04-14 @ 10:46  --------------------------------------------------------  IN: 0 mL / OUT: 400 mL / NET: -400 mL        PHYSICAL EXAM:  Gen: NAD, calm, obese  Cards: RRR, +S1/S2, no M/G/R  Resp: bibasilar rales  GI: soft, NT/ND, NABS  Vascular: no LE edema B/L      LABS:  04-14    137  |  94<L>  |  61<H>  ----------------------------<  96  4.8   |  36<H>  |  1.53<H>    Ca    10.3      14 Apr 2021 06:41  Phos  3.2     04-14  Mg     1.9     04-14      Creatinine Trend: 1.53 <--, 1.44 <--, 1.55 <--, 1.58 <--, 1.56 <--, 1.70 <--, 1.71 <--                        8.1    7.25  )-----------( 360      ( 14 Apr 2021 06:41 )             27.6     Urine Studies:                
ACP team PA Note     Patient is a 70y old  Female who presents with a chief complaint of SOB (13 Apr 2021 11:54). Pt c/o worsening shortness of breath at rest. Pt denies any CP/dizziness/diaphoresis/palpitations.         Review of symptoms:   Cardiac: +dyspnea, No chest pain. No palpitations.  Respiratory: + dyspnea. No cough.   Gastrointestinal: No diarrhea. No abdominal pain. No bleeding.       MEDICATIONS  (STANDING):  albuterol/ipratropium for Nebulization 3 milliLiter(s) Nebulizer every 12 hours  allopurinol 100 milliGRAM(s) Oral daily  budesonide 160 MICROgram(s)/formoterol 4.5 MICROgram(s) Inhaler 2 Puff(s) Inhalation two times a day  buMETAnide 2 milliGRAM(s) Oral daily  heparin  Infusion. 1000 Unit(s)/Hr (10 mL/Hr) IV Continuous <Continuous>  pantoprazole    Tablet 40 milliGRAM(s) Oral before breakfast  simvastatin 10 milliGRAM(s) Oral at bedtime    MEDICATIONS  (PRN):  aluminum hydroxide/magnesium hydroxide/simethicone Suspension 30 milliLiter(s) Oral every 6 hours PRN Dyspepsia  heparin   Injectable 8500 Unit(s) IV Push every 6 hours PRN For aPTT less than 40  heparin   Injectable 4000 Unit(s) IV Push every 6 hours PRN For aPTT between 40 - 57  sodium chloride 0.65% Nasal 2 Spray(s) Both Nostrils four times a day PRN Nasal Congestion            Vital Signs Last 24 Hrs  T(C): 37 (13 Apr 2021 21:04), Max: 37.2 (13 Apr 2021 15:07)  T(F): 98.6 (13 Apr 2021 21:04), Max: 98.9 (13 Apr 2021 15:07)  HR: 69 (13 Apr 2021 21:04) (66 - 71)  BP: 138/71 (13 Apr 2021 21:04) (109/52 - 138/71)  BP(mean): --  RR: 18 (13 Apr 2021 21:04) (17 - 19)  SpO2: 98% (13 Apr 2021 21:04) (98% - 100%)          INTERPRETATION OF TELEMETRY:    ECG: A fib 60's         LABS:                        8.0    6.76  )-----------( 279      ( 13 Apr 2021 08:23 )             28.1     04-13    133<L>  |  92<L>  |  61<H>  ----------------------------<  99  5.1   |  37<H>  |  1.44<H>    Ca    10.3      13 Apr 2021 08:23  Phos  3.3     04-13  Mg     2.0     04-13          PT/INR - ( 13 Apr 2021 08:23 )   PT: 20.9 sec;   INR: 1.87 ratio         PTT - ( 13 Apr 2021 08:23 )  PTT:73.1 sec    I&O's Summary    12 Apr 2021 07:01  -  13 Apr 2021 07:00  --------------------------------------------------------  IN: 570 mL / OUT: 1875 mL / NET: -1305 mL    13 Apr 2021 07:01  -  14 Apr 2021 02:18  --------------------------------------------------------  IN: 0 mL / OUT: 1150 mL / NET: -1150 mL      BNPSerum Pro-Brain Natriuretic Peptide: 1187 pg/mL (04-13 @ 08:24)      RADIOLOGY & ADDITIONAL STUDIES:        Physical Exam:  General; Pt appears well and in no acute distress   HEENT:   Normal oral mucosa, PERRL, EOMI	  Lymphatic: No lymphadenopathy , no edema  Cardiovascular: Normal S1 S2, No JVD, No murmurs , Peripheral pulses palpable 2+ bilaterally  Respiratory: Lungs -- diffuse rales, mildly increased effort 	  Gastrointestinal:  Soft, Non-tender, + BS	  Skin: No rashes, No ecchymoses, No cyanosis, warm to touch  Musculoskeletal: Normal range of motion, normal strength  Neurology: A&O x 4, cranial nerves intact, motor and sensory strenghts are intact, no focal weakness, no drift, no dysarthria, no dysmetria,   Psychiatry:  Mood & affect appropriate                      
PULMONARY PROGRESS NOTE    DAMARI GUERRERO  MRN-[id number]    Patient is a 70y old  Female who presents with a chief complaint of SOB (09 Apr 2021 12:42)      HPI:  sp prbc  resting in bed   on 2L    ROS:   -    ACTIVE MEDICATION LIST:  MEDICATIONS  (STANDING):  albuterol/ipratropium for Nebulization 3 milliLiter(s) Nebulizer every 6 hours  allopurinol 100 milliGRAM(s) Oral daily  budesonide 160 MICROgram(s)/formoterol 4.5 MICROgram(s) Inhaler 2 Puff(s) Inhalation two times a day  buMETAnide 2 milliGRAM(s) Oral daily  heparin  Infusion. 1000 Unit(s)/Hr (10 mL/Hr) IV Continuous <Continuous>  iron sucrose IVPB 200 milliGRAM(s) IV Intermittent once  pantoprazole    Tablet 40 milliGRAM(s) Oral before breakfast  simvastatin 10 milliGRAM(s) Oral at bedtime  warfarin 6 milliGRAM(s) Oral once    MEDICATIONS  (PRN):  aluminum hydroxide/magnesium hydroxide/simethicone Suspension 30 milliLiter(s) Oral every 6 hours PRN Dyspepsia  heparin   Injectable 8500 Unit(s) IV Push every 6 hours PRN For aPTT less than 40  heparin   Injectable 4000 Unit(s) IV Push every 6 hours PRN For aPTT between 40 - 57  sodium chloride 0.65% Nasal 2 Spray(s) Both Nostrils four times a day PRN Nasal Congestion      EXAM:  Vital Signs Last 24 Hrs  T(C): 37 (09 Apr 2021 10:16), Max: 37 (09 Apr 2021 10:16)  T(F): 98.6 (09 Apr 2021 10:16), Max: 98.6 (09 Apr 2021 10:16)  HR: 70 (09 Apr 2021 10:16) (64 - 87)  BP: 123/62 (09 Apr 2021 10:16) (110/54 - 127/57)  BP(mean): --  RR: 18 (09 Apr 2021 10:16) (18 - 18)  SpO2: 100% (09 Apr 2021 10:16) (94% - 100%)    GENERAL: The patient is asleep  easily arousable  on resp distress                              7.6    6.61  )-----------( 278      ( 09 Apr 2021 07:24 )             25.8       04-09    134<L>  |  94<L>  |  70<H>  ----------------------------<  170<H>  5.1   |  33<H>  |  1.70<H>    Ca    9.8      09 Apr 2021 07:24  Phos  3.6     04-09  Mg     2.2     04-09     ra< from: Xray Chest 1 View- PORTABLE-Urgent (04.01.21 @ 03:56) >    EXAM:  XR CHEST PORTABLE URGENT 1V        PROCEDURE DATE:  Apr 1 2021         INTERPRETATION:  CLINICAL INFORMATION: Chest pain.    EXAM: Frontal radiograph of the chest.    COMPARISON: Chest radiograph from 1/11/2021    FINDINGS:  Cardiomegaly. Status post mitral valve repair.    Bilateral perihilar opacities, likely representing pulmonary edema.    Sternotomy.    IMPRESSION: Bilateral perihilar opacities with cardiomegaly similar to the last exam consistent with CHF.            CHAPIS MORAES MD; Resident Radiology  This document has been electronically signed.  DELIA PEDERSEN MD; Attending Radiologist  This document has been electronically signed. Apr 1 2021  5:38AM    < end of copied text >      PROBLEM LIST:  70y Female with HEALTH ISSUES - PROBLEM Dx:  Acute on chronic heart failure with normal ejection fraction  Acute on chronic heart failure with normal ejection fraction    Bradycardia  Bradycardia    Anemia  Anemia    HTN (hypertension)  HTN (hypertension)    Mitral valve replaced  Mitral valve replaced    COPD (chronic obstructive pulmonary disease)  COPD (chronic obstructive pulmonary disease)    Stage 3 chronic kidney disease, unspecified whether stage 3a or 3b CKD  Stage 3 chronic kidney disease, unspecified whether stage 3a or 3b CKD    Need for prophylactic measure  Need for prophylactic measure              RECS:  continue nc  refusing nivv        Please call with any questions over the weekend    Irma Eaton DO  [facility] Pulmonary/Sleep Medicine  [phone]  
Patient is a 70y Female     Patient is a 70y old  Female who presents with a chief complaint of SOB (08 Apr 2021 16:08)      HPI:  70 y.o. F with PMH of morbid obesity, CAD,  HFpEF (EF 65-60%), severe MR and TR s/p mechanical MV and TV repair (on coumadin), Afib s/p failed ablation, HTN, HLD, HTN, MIGUEL, COPD (on 3L O2), CKD3, gout, anemia (on iron pills) who presents with worsening dyspnea since 4 days prior to admission. Patient reports dyspnea worsening with exertion and orthopnea. No increased cough, no increased sputum production, and patient has remained on her baseline of 3L NC. She also has been reporting intermittent dark stools, which she has attributed to taking PO iron. Patient feels she may have been consuming more liquids than her fluid restriction allows and sometimes took additional Lasix PRN at home. She also endorses epigastric pain that has since resolved. ROS negative for fevers, chills, N/V/D, dysuria, or hematuria.     She currently states she is breathing more comfortably after receiving IV Lasix in ED.  (01 Apr 2021 12:05)      PAST MEDICAL & SURGICAL HISTORY:  Atrial fibrillation  S/P Ablation    Pulmonary hypertension    MIGUEL (obstructive sleep apnea)    COPD (chronic obstructive pulmonary disease)  on home O2    CHF (congestive heart failure)    HTN (hypertension)    Gout    Mitral valve replaced    Tricuspid valve replaced    CHF (congestive heart failure)    Hypertension    COPD (chronic obstructive pulmonary disease)    History of mitral valve replacement with mechanical valve    Tricuspid valve replaced  mechanical    No significant past surgical history        MEDICATIONS  (STANDING):  albuterol/ipratropium for Nebulization 3 milliLiter(s) Nebulizer every 6 hours  allopurinol 100 milliGRAM(s) Oral daily  budesonide 160 MICROgram(s)/formoterol 4.5 MICROgram(s) Inhaler 2 Puff(s) Inhalation two times a day  buMETAnide 2 milliGRAM(s) Oral daily  heparin  Infusion. 1300 Unit(s)/Hr (13 mL/Hr) IV Continuous <Continuous>  iron sucrose IVPB 200 milliGRAM(s) IV Intermittent <User Schedule>  pantoprazole    Tablet 40 milliGRAM(s) Oral before breakfast  simvastatin 10 milliGRAM(s) Oral at bedtime      Allergies    No Known Allergies    Intolerances        SOCIAL HISTORY:  Denies ETOh,Smoking,     FAMILY HISTORY:  Family history of hypertension (Father, Sibling)    Family history of stroke    Family history of hypertension (Mother)        REVIEW OF SYSTEMS:    CONSTITUTIONAL: No weakness, fevers or chills  EYES/ENT: No visual changes;  No vertigo or throat pain   NECK: No pain or stiffness  RESPIRATORY: No cough, wheezing, hemoptysis; No shortness of breath  CARDIOVASCULAR: No chest pain or palpitations  GASTROINTESTINAL: No abdominal or epigastric pain. No nausea, vomiting, or hematemesis; No diarrhea or constipation. No melena or hematochezia.  GENITOURINARY: No dysuria, frequency or hematuria  NEUROLOGICAL: No numbness or weakness  SKIN: No itching, burning, rashes, or lesions   All other review of systems is negative unless indicated above.    VITAL:  T(C): , Max: 36.8 (04-08-21 @ 09:43)  T(F): , Max: 98.3 (04-08-21 @ 09:43)  HR: 80 (04-08-21 @ 15:38)  BP: 127/57 (04-08-21 @ 14:39)  BP(mean): --  RR: 18 (04-08-21 @ 14:39)  SpO2: 96% (04-08-21 @ 15:38)  Wt(kg): --    I and O's:    04-06 @ 07:01  -  04-07 @ 07:00  --------------------------------------------------------  IN: 1104 mL / OUT: 1700 mL / NET: -596 mL    04-07 @ 07:01  -  04-08 @ 07:00  --------------------------------------------------------  IN: 236 mL / OUT: 1500 mL / NET: -1264 mL    04-08 @ 07:01  -  04-08 @ 21:15  --------------------------------------------------------  IN: 600 mL / OUT: 950 mL / NET: -350 mL          LABS:                        7.0    5.93  )-----------( 277      ( 08 Apr 2021 06:15 )             24.3     04-08    134<L>  |  95<L>  |  73<H>  ----------------------------<  123<H>  5.0   |  32<H>  |  1.71<H>    Ca    9.4      08 Apr 2021 06:15  Phos  4.1     04-08  Mg     2.3     04-08            RADIOLOGY & ADDITIONAL STUDIES:                          
Patient is a 70y Female     Patient is a 70y old  Female who presents with a chief complaint of SOB (08 Apr 2021 21:15)      HPI:  70 y.o. F with PMH of morbid obesity, CAD,  HFpEF (EF 65-60%), severe MR and TR s/p mechanical MV and TV repair (on coumadin), Afib s/p failed ablation, HTN, HLD, HTN, MIGUEL, COPD (on 3L O2), CKD3, gout, anemia (on iron pills) who presents with worsening dyspnea since 4 days prior to admission. Patient reports dyspnea worsening with exertion and orthopnea. No increased cough, no increased sputum production, and patient has remained on her baseline of 3L NC. She also has been reporting intermittent dark stools, which she has attributed to taking PO iron. Patient feels she may have been consuming more liquids than her fluid restriction allows and sometimes took additional Lasix PRN at home. She also endorses epigastric pain that has since resolved. ROS negative for fevers, chills, N/V/D, dysuria, or hematuria.     She currently states she is breathing more comfortably after receiving IV Lasix in ED.  (01 Apr 2021 12:05)      PAST MEDICAL & SURGICAL HISTORY:  Atrial fibrillation  S/P Ablation    Pulmonary hypertension    MIGUEL (obstructive sleep apnea)    COPD (chronic obstructive pulmonary disease)  on home O2    CHF (congestive heart failure)    HTN (hypertension)    Gout    Mitral valve replaced    Tricuspid valve replaced    CHF (congestive heart failure)    Hypertension    COPD (chronic obstructive pulmonary disease)    History of mitral valve replacement with mechanical valve    Tricuspid valve replaced  mechanical    No significant past surgical history        MEDICATIONS  (STANDING):  albuterol/ipratropium for Nebulization 3 milliLiter(s) Nebulizer every 6 hours  allopurinol 100 milliGRAM(s) Oral daily  budesonide 160 MICROgram(s)/formoterol 4.5 MICROgram(s) Inhaler 2 Puff(s) Inhalation two times a day  buMETAnide 2 milliGRAM(s) Oral daily  heparin  Infusion. 1300 Unit(s)/Hr (13 mL/Hr) IV Continuous <Continuous>  pantoprazole    Tablet 40 milliGRAM(s) Oral before breakfast  simvastatin 10 milliGRAM(s) Oral at bedtime      Allergies    No Known Allergies    Intolerances        SOCIAL HISTORY:  Denies ETOh,Smoking,     FAMILY HISTORY:  Family history of hypertension (Father, Sibling)    Family history of stroke    Family history of hypertension (Mother)        REVIEW OF SYSTEMS:    CONSTITUTIONAL: No weakness, fevers or chills  EYES/ENT: No visual changes;  No vertigo or throat pain   NECK: No pain or stiffness  RESPIRATORY: No cough, wheezing, hemoptysis; No shortness of breath  CARDIOVASCULAR: No chest pain or palpitations  GASTROINTESTINAL: No abdominal or epigastric pain. No nausea, vomiting, or hematemesis; No diarrhea or constipation. No melena or hematochezia.  GENITOURINARY: No dysuria, frequency or hematuria  NEUROLOGICAL: No numbness or weakness  SKIN: No itching, burning, rashes, or lesions   All other review of systems is negative unless indicated above.    VITAL:  T(C): , Max: 36.8 (04-08-21 @ 09:43)  T(F): , Max: 98.3 (04-08-21 @ 09:43)  HR: 87 (04-09-21 @ 05:23)  BP: 116/52 (04-09-21 @ 05:23)  BP(mean): --  RR: 18 (04-09-21 @ 05:23)  SpO2: 100% (04-09-21 @ 05:23)  Wt(kg): --    I and O's:    04-07 @ 07:01  -  04-08 @ 07:00  --------------------------------------------------------  IN: 236 mL / OUT: 1500 mL / NET: -1264 mL    04-08 @ 07:01  -  04-09 @ 07:00  --------------------------------------------------------  IN: 900 mL / OUT: 1525 mL / NET: -625 mL          PHYSICAL EXAM:    Constitutional: NAD  HEENT: PERRLA,   Neck: No JVD  Respiratory: CTA B/L  Cardiovascular: S1 and S2  Gastrointestinal: BS+, soft, NT/ND  Extremities: No peripheral edema  Neurological: A/O x 3, no focal deficits  Psychiatric: Normal mood, normal affect  : No Junior  Skin: No rashes  Access: Not applicable  Back: No CVA tenderness    LABS:                        8.0    6.61  )-----------( 285      ( 08 Apr 2021 21:11 )             26.9     04-08    134<L>  |  95<L>  |  73<H>  ----------------------------<  123<H>  5.0   |  32<H>  |  1.71<H>    Ca    9.4      08 Apr 2021 06:15  Phos  4.1     04-08  Mg     2.3     04-08            RADIOLOGY & ADDITIONAL STUDIES:                          
DATE OF SERVICE:  Patient was seen and examined ,interim events noted.Consultant notes ,Labs,Telemetry reviewed by me    PRESENTING CC:    HPI and HOSPITAL COURSE: HPI:  70 y.o. F with PMH of morbid obesity, CAD,  HFpEF (EF 65-60%), severe MR and TR s/p mechanical MV and TV repair (on coumadin), Afib s/p failed ablation, HTN, HLD, HTN, MIGUEL, COPD (on 3L O2), CKD3, gout, anemia (on iron pills) who presents with worsening dyspnea since 4 days prior to admission. Patient reports dyspnea worsening with exertion and orthopnea. No increased cough, no increased sputum production, and patient has remained on her baseline of 3L NC. She also has been reporting intermittent dark stools, which she has attributed to taking PO iron. Patient feels she may have been consuming more liquids than her fluid restriction allows and sometimes took additional Lasix PRN at home. She also endorses epigastric pain that has since resolved. ROS negative for fevers, chills, N/V/D, dysuria, or hematuria.     She currently states she is breathing more comfortably after receiving IV Lasix in ED.  (01 Apr 2021 12:05)      INTERIM EVENTS:      PMH -reviewed admission note, no change since admission  Heart Failure: Acute [ ] Chronic [ ] Acute on Chronic [ ] Diastolic [ ] Systolic [ ] Combined Systolic and Diastolic[ ]  MISAEL[ ]  ATN[ ]  CKD I [ ] CKDII [ ] CKD III [ ] CKD IV [ ] CKD V [ ] ESRD[ ]  HTN[ ] CVA[ ] DM[ ] COPD[ ] COVID[ ] AF[ ]  PPM[ ] ICD[ ]    MEDICATIONS  (STANDING):  albuterol/ipratropium for Nebulization 3 milliLiter(s) Nebulizer every 6 hours  allopurinol 100 milliGRAM(s) Oral daily  budesonide 160 MICROgram(s)/formoterol 4.5 MICROgram(s) Inhaler 2 Puff(s) Inhalation two times a day  buMETAnide 2 milliGRAM(s) Oral daily  heparin  Infusion. 1300 Unit(s)/Hr (13 mL/Hr) IV Continuous <Continuous>  iron sucrose IVPB 200 milliGRAM(s) IV Intermittent <User Schedule>  pantoprazole    Tablet 40 milliGRAM(s) Oral before breakfast  simvastatin 10 milliGRAM(s) Oral at bedtime    MEDICATIONS  (PRN):  aluminum hydroxide/magnesium hydroxide/simethicone Suspension 30 milliLiter(s) Oral every 6 hours PRN Dyspepsia  heparin   Injectable 8500 Unit(s) IV Push every 6 hours PRN For aPTT less than 40  heparin   Injectable 4000 Unit(s) IV Push every 6 hours PRN For aPTT between 40 - 57  sodium chloride 0.65% Nasal 2 Spray(s) Both Nostrils four times a day PRN Nasal Congestion            REVIEW OF SYSTEMS:  Constitutional: [ ] fever, [ ]weight loss,  [ ]fatigue  Eyes: [ ] visual changes  Respiratory: [ ]shortness of breath;  [ ] cough, [ ]wheezing, [ ]chills, [ ]hemoptysis  Cardiovascular: [ ] chest pain, [ ]palpitations, [ ]dizziness,  [ ]leg swelling[ ]orthopnea[ ]PND  Gastrointestinal: [ ] abdominal pain, [ ]nausea, [ ]vomiting,  [ ]diarrhea [ ]Constipation [ ]Melena  Genitourinary: [ ] dysuria, [ ] hematuria [ ]Junior  Neurologic: [ ] headaches [ ] tremors[ ]weakness [ ]Paralysis Right[ ] Left[ ]  Skin: [ ] itching, [ ]burning, [ ] rashes  Endocrine: [ ] heat or cold intolerance  Musculoskeletal: [ ] joint pain or swelling; [ ] muscle, back, or extremity pain  Psychiatric: [ ] depression, [ ]anxiety, [ ]mood swings, or [ ]difficulty sleeping  Hematologic: [ ] easy bruising, [ ] bleeding gums    [x] All remaining systems negative except as per above.   [ ]Unable to obtain.    Vital Signs Last 24 Hrs  T(C): 36.7 (08 Apr 2021 05:43), Max: 36.8 (07 Apr 2021 17:20)  T(F): 98 (08 Apr 2021 05:43), Max: 98.2 (07 Apr 2021 17:20)  HR: 65 (08 Apr 2021 05:43) (60 - 76)  BP: 112/41 (08 Apr 2021 05:43) (112/41 - 142/68)  BP(mean): --  RR: 18 (08 Apr 2021 05:43) (17 - 18)  SpO2: 100% (08 Apr 2021 05:43) (96% - 100%)  I&O's Summary    07 Apr 2021 07:01 - 08 Apr 2021 07:00  --------------------------------------------------------  IN: 236 mL / OUT: 1500 mL / NET: -1264 mL        PHYSICAL EXAM:  General: No acute distress BMI-  HEENT: EOMI, PERRL  Neck: Supple, [ ] JVD  Lungs: Equal air entry bilaterally; [ ] rales [ ] wheezing [ ] rhonchi  Heart: Regular rate and rhythm; [ ] murmur   /6 [ ] systolic [ ] diastolic [ ] radiation[ ] rubs [ ]  gallops  Abdomen: Nontender, bowel sounds present  Extremities: No clubbing, cyanosis, [ ] edema [ ]Pulses  equal and intact  Nervous system:  Alert & Oriented X3, no focal deficits  Psychiatric: Normal affect  Skin: No rashes or lesions    LABS:  04-08    134<L>  |  95<L>  |  73<H>  ----------------------------<  123<H>  5.0   |  32<H>  |  1.71<H>    Ca    9.4      08 Apr 2021 06:15  Phos  4.1     04-08  Mg     2.3     04-08      Creatinine Trend: 1.71<--, 1.72<--, 1.81<--, 1.96<--, 2.07<--, 1.84<--                        7.2    6.52  )-----------( 264      ( 07 Apr 2021 06:27 )             25.4     PT/INR - ( 08 Apr 2021 06:15 )   PT: 15.8 sec;   INR: 1.40 ratio         PTT - ( 08 Apr 2021 06:15 )  PTT:87.1 sec    Cardiac Enzymes:           RADIOLOGY:    ECG [my interpretation]:    TELEMETRY:Reviewed monitor tracings-    ECHO:    STRESS TEST:    CATHETERIZATION:      IMPRESSION AND PLAN:      
DATE OF SERVICE:  Patient was seen and examined ,interim events noted.Consultant notes ,Labs,Telemetry reviewed by me    PRESENTING CC:    HPI and HOSPITAL COURSE: HPI:  70 y.o. F with PMH of morbid obesity, CAD,  HFpEF (EF 65-60%), severe MR and TR s/p mechanical MV and TV repair (on coumadin), Afib s/p failed ablation, HTN, HLD, HTN, MIGUEL, COPD (on 3L O2), CKD3, gout, anemia (on iron pills) who presents with worsening dyspnea since 4 days prior to admission. Patient reports dyspnea worsening with exertion and orthopnea. No increased cough, no increased sputum production, and patient has remained on her baseline of 3L NC. She also has been reporting intermittent dark stools, which she has attributed to taking PO iron. Patient feels she may have been consuming more liquids than her fluid restriction allows and sometimes took additional Lasix PRN at home. She also endorses epigastric pain that has since resolved. ROS negative for fevers, chills, N/V/D, dysuria, or hematuria.     She currently states she is breathing more comfortably after receiving IV Lasix in ED.  (01 Apr 2021 12:05)      INTERIM EVENTS:      PMH -reviewed admission note, no change since admission  Heart Failure: Acute [ ] Chronic [ ] Acute on Chronic [ ] Diastolic [ ] Systolic [ ] Combined Systolic and Diastolic[ ]  MISAEL[ ]  ATN[ ]  CKD I [ ] CKDII [ ] CKD III [ ] CKD IV [ ] CKD V [ ] ESRD[ ]  HTN[ ] CVA[ ] DM[ ] COPD[ ] COVID[ ] AF[ ]  PPM[ ] ICD[ ]    MEDICATIONS  (STANDING):  albuterol/ipratropium for Nebulization 3 milliLiter(s) Nebulizer every 6 hours  allopurinol 100 milliGRAM(s) Oral daily  budesonide 160 MICROgram(s)/formoterol 4.5 MICROgram(s) Inhaler 2 Puff(s) Inhalation two times a day  furosemide   Injectable 40 milliGRAM(s) IV Push two times a day  heparin  Infusion. 1300 Unit(s)/Hr (13 mL/Hr) IV Continuous <Continuous>  lisinopril 10 milliGRAM(s) Oral daily  pantoprazole    Tablet 40 milliGRAM(s) Oral before breakfast  simvastatin 10 milliGRAM(s) Oral at bedtime    MEDICATIONS  (PRN):  aluminum hydroxide/magnesium hydroxide/simethicone Suspension 30 milliLiter(s) Oral every 6 hours PRN Dyspepsia  heparin   Injectable 8500 Unit(s) IV Push every 6 hours PRN For aPTT less than 40  heparin   Injectable 4000 Unit(s) IV Push every 6 hours PRN For aPTT between 40 - 57  oxymetazoline 0.05% Nasal Spray 2 Spray(s) Both Nostrils every 12 hours PRN Nose bleeds  sodium chloride 0.65% Nasal 2 Spray(s) Both Nostrils four times a day PRN Nasal Congestion            REVIEW OF SYSTEMS:  Constitutional: [ ] fever, [ ]weight loss,  [ ]fatigue  Eyes: [ ] visual changes  Respiratory: [ ]shortness of breath;  [ ] cough, [ ]wheezing, [ ]chills, [ ]hemoptysis  Cardiovascular: [ ] chest pain, [ ]palpitations, [ ]dizziness,  [ ]leg swelling[ ]orthopnea[ ]PND  Gastrointestinal: [ ] abdominal pain, [ ]nausea, [ ]vomiting,  [ ]diarrhea [ ]Constipation [ ]Melena  Genitourinary: [ ] dysuria, [ ] hematuria [ ]Junior  Neurologic: [ ] headaches [ ] tremors[ ]weakness [ ]Paralysis Right[ ] Left[ ]  Skin: [ ] itching, [ ]burning, [ ] rashes  Endocrine: [ ] heat or cold intolerance  Musculoskeletal: [ ] joint pain or swelling; [ ] muscle, back, or extremity pain  Psychiatric: [ ] depression, [ ]anxiety, [ ]mood swings, or [ ]difficulty sleeping  Hematologic: [ ] easy bruising, [ ] bleeding gums    [x] All remaining systems negative except as per above.   [ ]Unable to obtain.    Vital Signs Last 24 Hrs  T(C): 36.7 (03 Apr 2021 06:12), Max: 36.8 (02 Apr 2021 22:21)  T(F): 98 (03 Apr 2021 06:12), Max: 98.2 (02 Apr 2021 22:21)  HR: 68 (03 Apr 2021 09:54) (56 - 78)  BP: 126/62 (03 Apr 2021 06:12) (115/36 - 135/64)  BP(mean): --  RR: 18 (03 Apr 2021 06:12) (18 - 20)  SpO2: 96% (03 Apr 2021 09:54) (94% - 100%)  I&O's Summary    02 Apr 2021 07:01  -  03 Apr 2021 07:00  --------------------------------------------------------  IN: 0 mL / OUT: 750 mL / NET: -750 mL        PHYSICAL EXAM:  General: No acute distress BMI-  HEENT: EOMI, PERRL  Neck: Supple, [ ] JVD  Lungs: Equal air entry bilaterally; [ ] rales [ ] wheezing [ ] rhonchi  Heart: Regular rate and rhythm; [ ] murmur   /6 [ ] systolic [ ] diastolic [ ] radiation[ ] rubs [ ]  gallops  Abdomen: Nontender, bowel sounds present  Extremities: No clubbing, cyanosis, [ ] edema [ ]Pulses  equal and intact  Nervous system:  Alert & Oriented X3, no focal deficits  Psychiatric: Normal affect  Skin: No rashes or lesions    LABS:  04-03    136  |  92<L>  |  73<H>  ----------------------------<  106<H>  4.8   |  36<H>  |  1.79<H>    Ca    9.9      03 Apr 2021 03:07  Phos  3.3     04-02  Mg     2.5     04-03      Creatinine Trend: 1.79<--, 1.78<--, 1.63<--                        7.8    6.41  )-----------( 270      ( 03 Apr 2021 03:07 )             27.0     PT/INR - ( 02 Apr 2021 07:10 )   PT: 15.6 sec;   INR: 1.38 ratio         PTT - ( 03 Apr 2021 03:07 )  PTT:83.1 sec    Cardiac Enzymes:           RADIOLOGY:    ECG [my interpretation]:    TELEMETRY:Reviewed monitor tracings-    ECHO:    STRESS TEST:    CATHETERIZATION:      IMPRESSION AND PLAN:      
DATE OF SERVICE: 04/02/2021 Patient was seen and examined ,interim events noted.Consultant notes ,Labs,Telemetry reviewed by me    PRESENTING CC:Dyspnea    HPI and HOSPITAL COURSE:70 y.o. F with PMH of morbid obesity, CAD,  HFpEF (EF 65-60%), severe MR and TR s/p mechanical MV and TV repair (on coumadin), Afib s/p failed ablation, HTN, HLD, HTN, MIGUEL, COPD (on 3L O2), CKD3, gout, anemia (on iron pills) who presents with worsening dyspnea since 4 days prior to admission.   She currently states she is breathing more comfortably after receiving IV Lasix in ED.      INTERIM EVENTS:Awake is less dyspneac not orthopneac no further epistaxis.EP consult noted      PMH -reviewed admission note, no change since admission  Heart Failure: Acute [ ] Chronic [ ] Acute on Chronic [x ] Diastolic [x ] Systolic [ ] Combined Systolic and Diastolic[ ]  MISAEL[ ]  ATN[ ]  CKD I [ ] CKDII [ ] CKD III [ ] CKD IV [ ] CKD V [ ] ESRD[ ]  HTN[x ] CVA[ ] DM[ ] COPD[ ] COVID[ ] AF[x ]  PPM[ ] ICD[ ]    MEDICATIONS  (STANDING):  albuterol/ipratropium for Nebulization 3 milliLiter(s) Nebulizer every 6 hours  allopurinol 100 milliGRAM(s) Oral daily  budesonide 160 MICROgram(s)/formoterol 4.5 MICROgram(s) Inhaler 2 Puff(s) Inhalation two times a day  furosemide   Injectable 40 milliGRAM(s) IV Push two times a day  heparin  Infusion. 1400 Unit(s)/Hr (14 mL/Hr) IV Continuous <Continuous>  lisinopril 10 milliGRAM(s) Oral daily  pantoprazole    Tablet 40 milliGRAM(s) Oral before breakfast  simvastatin 10 milliGRAM(s) Oral at bedtime    MEDICATIONS  (PRN):  aluminum hydroxide/magnesium hydroxide/simethicone Suspension 30 milliLiter(s) Oral every 6 hours PRN Dyspepsia  heparin   Injectable 8500 Unit(s) IV Push every 6 hours PRN For aPTT less than 40  heparin   Injectable 4000 Unit(s) IV Push every 6 hours PRN For aPTT between 40 - 57  oxymetazoline 0.05% Nasal Spray 2 Spray(s) Both Nostrils every 12 hours PRN Nose bleeds  sodium chloride 0.65% Nasal 2 Spray(s) Both Nostrils four times a day PRN Nasal Congestion            REVIEW OF SYSTEMS:  Constitutional: [ ] fever, [ ]weight loss,  [x ]fatigue  Eyes: [ ] visual changes  Respiratory: [ ]shortness of breath;  [ ] cough, [ ]wheezing, [ ]chills, [ ]hemoptysis  Cardiovascular: [ ] chest pain, [ ]palpitations, [ ]dizziness,  [ ]leg swelling[ ]orthopnea[ ]PND  Gastrointestinal: [ ] abdominal pain, [ ]nausea, [ ]vomiting,  [ ]diarrhea [ ]Constipation [ ]Melena  Genitourinary: [ ] dysuria, [ ] hematuria [ ]Junior  Neurologic: [ ] headaches [ ] tremors[ ]weakness [ ]Paralysis Right[ ] Left[ ]  Skin: [ ] itching, [ ]burning, [ ] rashes  Endocrine: [ ] heat or cold intolerance  Musculoskeletal: [ ] joint pain or swelling; [ ] muscle, back, or extremity pain  Psychiatric: [ ] depression, [ ]anxiety, [ ]mood swings, or [ ]difficulty sleeping  Hematologic: [ ] easy bruising, [ ] bleeding gums    [x] All remaining systems negative except as per above.   [ ]Unable to obtain.    Vital Signs Last 24 Hrs  T(C): 36.8 (02 Apr 2021 06:41), Max: 36.8 (01 Apr 2021 19:52)  T(F): 98.2 (02 Apr 2021 06:41), Max: 98.2 (01 Apr 2021 19:52)  HR: 62 (02 Apr 2021 06:41) (60 - 87)  BP: 121/60 (02 Apr 2021 06:41) (121/60 - 188/83)  RR: 18 (02 Apr 2021 06:41) (17 - 21)  SpO2: 100% (02 Apr 2021 06:41) (95% - 100%)  I&O's Summary      PHYSICAL EXAM:  General: No acute distress BMI-39.5  HEENT: EOMI, PERRL  Neck: Supple, [ ] JVD  Lungs: Equal air entry bilaterally; [ ] rales [ ] wheezing [x ] rhonchi  Heart: Regular rate and rhythm; [x ] murmur   3/6 [x ] systolic [ ] diastolic [ ] radiation[ ] rubs [ ]  gallops  Abdomen: Nontender, bowel sounds present  Extremities: No clubbing, cyanosis, [x ] edema [ ]Pulses  equal and intact  Nervous system:  Alert & Oriented X3, no focal deficits  Psychiatric: Normal affect  Skin: No rashes or lesions    LABS:  04-01    138  |  94<L>  |  81<H>  ----------------------------<  118<H>  5.1   |  36<H>  |  1.63<H>    Ca    10.7<H>      01 Apr 2021 02:35  Phos  3.3     04-01  Mg     2.5     04-01    TPro  8.4<H>  /  Alb  3.8  /  TBili  0.3  /  DBili  x   /  AST  23  /  ALT  15  /  AlkPhos  103  04-01    Creatinine Trend: 1.63<--                        8.5    6.71  )-----------( 271      ( 01 Apr 2021 20:39 )             30.5     PT/INR - ( 02 Apr 2021 07:10 )   PT: 15.6 sec;   INR: 1.38 ratio         PTT - ( 02 Apr 2021 07:10 )  PTT:41.1 sec    Serum Pro-Brain Natriuretic Peptide: 1951 pg/mL (04-01-21 @ 02:35)        TELEMETRY:Reviewed monitor tracings-Sinus rhythm      IMPRESSION AND PLAN:    70 y.o. F with PMH of morbid obesity, CAD,  HFpEF (EF 65-60%), severe MR and TR s/p mechanical MV and TV repair (on coumadin), Afib s/p failed ablation, HTN, HLD, HTN, MIGUEL, COPD (on 3L O2), CKD3, gout, anemia (on iron pills) who presents with worsening dyspnea likely in the setting of acute on chronic heart failure exacerbation.     Problem/Plan - 1:  ·  Problem: Acute on chronic heart failure with normal ejection fraction.  Plan: -VILLA, orthopnea, and pulmonary vascular congestion -- symptoms are clinically consistent with ADHF exacerbation, less likely COPD exacerbation given no increase in cough, sputum production, or O2 requirement  -will diurese with IV Lasix 40 mg BID  -strict I's and O's  -daily weight  -repeat TTE.    Problem/Plan - 2:  ·  Problem: Bradycardia.  Plan: -currently in Afib with slow ventricular response  -will hold metoprolol and digoxin  -EP consult noted no Metoprolol     Problem/Plan - 3:  ·  Problem: Anemia.  Plan: -pt with normocytic anemia, currently 7.8, nearly at baseline of 8  -rectal exam without evidence of melena or BRBPR; suspect possibly in setting of PO iron use  -monitor CBC q12h   -f/u anemia w/u.     Problem/Plan - 4:  ·  Problem: HTN (hypertension).  Plan: -currently normotensive, hold ramipril.     Problem/Plan - 5:  ·  Problem: Mitral valve replaced.  Plan: -mechanical valve  -subtherapeutic INR; will do heparin bridge to coumadin.     Problem/Plan - 6:  Problem: COPD (chronic obstructive pulmonary disease). Plan: -c/w on 3L NC  -c/w Symbicort  -duonebs.    Problem/Plan - 7:  ·  Problem: Stage 3 chronic kidney disease, unspecified whether stage 3a or 3b CKD.  Plan: -baseline approximately 1.5-1.6, currently at baseline  -avoid  nephrotoxins and renally dose medications.     Problem/Plan - 8:  ·  Problem: Need for prophylactic measure.  Plan: -DVT ppx: heparin gtt  -Diet: DASH.     
[facility] NEPHROLOGY- PROGRESS NOTE    70y Female with history of obesity, severe MR and TR s/p repair, COPD on home O2 presents with dyspnea. Nephrology consulted for elevated Scr.    REVIEW OF SYSTEMS:  Gen: no fevers  Cards: no chest pain  Resp: + dyspnea improving  GI: no nausea or vomiting or diarrhea  Vascular: + LE edema resolved    No Known Allergies      Hospital Medications: Medications reviewed      VITALS:  T(F): 98.6 (21 @ 10:16), Max: 98.6 (21 @ 10:16)  HR: 70 (21 @ 10:16)  BP: 123/62 (21 @ 10:16)  RR: 18 (21 @ 10:16)  SpO2: 100% (21 @ 10:16)  Wt(kg): --     @ 07:01  -   @ 07:00  --------------------------------------------------------  IN: 900 mL / OUT: 1525 mL / NET: -625 mL        PHYSICAL EXAM:    Gen: NAD, calm, obese  Cards: RRR, +S1/S2, no M/G/R  Resp: CTA B/L  GI: soft, NT/ND, NABS  Vascular: no LE edema B/L        LABS:      134<L>  |  94<L>  |  70<H>  ----------------------------<  170<H>  5.1   |  33<H>  |  1.70<H>    Ca    9.8      2021 07:24  Phos  3.6       Mg     2.2           Creatinine Trend: 1.70 <--, 1.71 <--, 1.72 <--, 1.81 <--, 1.96 <--, 2.07 <--, 1.84 <--, 1.79 <--                        7.6    6.61  )-----------( 278      ( 2021 07:24 )             25.8     Urine Studies:  Urinalysis Basic - ( 2021 09:47 )    Color: Light Yellow / Appearance: Clear / S.012 / pH:   Gluc:  / Ketone: Negative  / Bili: Negative / Urobili: <2 mg/dL   Blood:  / Protein: Negative / Nitrite: Negative   Leuk Esterase: Negative / RBC:  / WBC    Sq Epi:  / Non Sq Epi:  / Bacteria:       Osmolality, Random Urine: 347 mosm/kg ( @ 13:33)  Sodium, Random Urine: 47 mmol/L ( @ 13:33)  Creatinine, Random Urine: 58 mg/dL ( @ 13:33)  Sodium, Random Urine: 72 mmol/L ( @ 09:47)  Potassium, Random Urine: 33.9 mmol/L ( @ 09:47)  Chloride, Random Urine: 53 mmol/L ( @ 09:47)  Creatinine, Random Urine: 56 mg/dL ( @ 09:47)    
PULMONARY PROGRESS NOTE    DAMARI GUERRERO  MRN-[id number]    Patient is a 70y old  Female who presents with a chief complaint of SOB (16 Apr 2021 11:02)      HPI:  on NC  short of breath at night    -    ROS:   -    ACTIVE MEDICATION LIST:  MEDICATIONS  (STANDING):  albuterol/ipratropium for Nebulization 3 milliLiter(s) Nebulizer every 12 hours  allopurinol 100 milliGRAM(s) Oral daily  budesonide 160 MICROgram(s)/formoterol 4.5 MICROgram(s) Inhaler 2 Puff(s) Inhalation two times a day  buMETAnide 1 milliGRAM(s) Oral every 12 hours  epoetin livia-epbx (RETACRIT) Injectable 40369 Unit(s) SubCutaneous once  pantoprazole    Tablet 40 milliGRAM(s) Oral before breakfast  simvastatin 10 milliGRAM(s) Oral at bedtime  warfarin 7.5 milliGRAM(s) Oral once    MEDICATIONS  (PRN):  aluminum hydroxide/magnesium hydroxide/simethicone Suspension 30 milliLiter(s) Oral every 6 hours PRN Dyspepsia  sodium chloride 0.65% Nasal 2 Spray(s) Both Nostrils four times a day PRN Nasal Congestion      EXAM:  Vital Signs Last 24 Hrs  T(C): 36.9 (16 Apr 2021 06:05), Max: 37.3 (15 Apr 2021 17:06)  T(F): 98.4 (16 Apr 2021 06:05), Max: 99.1 (15 Apr 2021 17:06)  HR: 69 (16 Apr 2021 08:21) (64 - 74)  BP: 112/64 (16 Apr 2021 06:05) (112/64 - 135/63)  BP(mean): --  RR: 17 (16 Apr 2021 06:05) (17 - 19)  SpO2: 98% (16 Apr 2021 08:21) (98% - 100%)    GENERAL: The patient is awake and alert in no apparent distress.     LUNGS: Clear to auscultation without wheezing, rales or rhonchi; respirations unlabored    HEART: Regular rate and rhythm without murmur.                            8.6    7.19  )-----------( 304      ( 16 Apr 2021 08:38 )             29.3       04-16    136  |  92<L>  |  73<H>  ----------------------------<  102<H>  4.4   |  33<H>  |  1.74<H>    Ca    10.2      16 Apr 2021 08:38  Phos  3.8     04-16  Mg     1.9     04-16       rad< from: Xray Chest 1 View- PORTABLE-Urgent (Xray Chest 1 View- PORTABLE-Urgent .) (04.13.21 @ 20:51) >    EXAM:  XR CHEST PORTABLE URGENT 1V        PROCEDURE DATE:  Apr 13 2021         INTERPRETATION:  DATE OF STUDY: 4/13/21    PRIOR:4/1/21    CLINICAL INDICATION: Dyspnea    TECHNIQUE: portable chest.    FINDINGS/  IMPRESSION:  Stable cardiomegaly.  S/P sternotomy and mitral valve replacement.  Stable pulmonary edema changes. Large pleural effusion. No pneumothorax                AMOS DAVIS MD; Attending Radiologist  This document has been electronically signed. Apr 14 2021 10:48AM    < end of copied text >       PROBLEM LIST:  70y Female with HEALTH ISSUES - PROBLEM Dx:  Acute on chronic heart failure with normal ejection fraction  Acute on chronic heart failure with normal ejection fraction    Bradycardia  Bradycardia    Anemia  Anemia    HTN (hypertension)  HTN (hypertension)    Mitral valve replaced  Mitral valve replaced    COPD (chronic obstructive pulmonary disease)  COPD (chronic obstructive pulmonary disease)    Stage 3 chronic kidney disease, unspecified whether stage 3a or 3b CKD  Stage 3 chronic kidney disease, unspecified whether stage 3a or 3b CKD    Need for prophylactic measure  Need for prophylactic measure         RECS:  suggest she go back on her NIVV But she is refusing  taper 02  continue inhalers  importance of untreated MIGUEL discussed       Please call with any questions over the weekend    Irma Eaton DO  [facility]  [phone]  
Patient is a 70y Female     Patient is a 70y old  Female who presents with a chief complaint of SOB (09 Apr 2021 13:48)      HPI:  70 y.o. F with PMH of morbid obesity, CAD,  HFpEF (EF 65-60%), severe MR and TR s/p mechanical MV and TV repair (on coumadin), Afib s/p failed ablation, HTN, HLD, HTN, MIGUEL, COPD (on 3L O2), CKD3, gout, anemia (on iron pills) who presents with worsening dyspnea since 4 days prior to admission. Patient reports dyspnea worsening with exertion and orthopnea. No increased cough, no increased sputum production, and patient has remained on her baseline of 3L NC. She also has been reporting intermittent dark stools, which she has attributed to taking PO iron. Patient feels she may have been consuming more liquids than her fluid restriction allows and sometimes took additional Lasix PRN at home. She also endorses epigastric pain that has since resolved. ROS negative for fevers, chills, N/V/D, dysuria, or hematuria.     She currently states she is breathing more comfortably after receiving IV Lasix in ED.  (01 Apr 2021 12:05)      PAST MEDICAL & SURGICAL HISTORY:  Atrial fibrillation  S/P Ablation    Pulmonary hypertension    MIGUEL (obstructive sleep apnea)    COPD (chronic obstructive pulmonary disease)  on home O2    CHF (congestive heart failure)    HTN (hypertension)    Gout    Mitral valve replaced    Tricuspid valve replaced    CHF (congestive heart failure)    Hypertension    COPD (chronic obstructive pulmonary disease)    History of mitral valve replacement with mechanical valve    Tricuspid valve replaced  mechanical    No significant past surgical history        MEDICATIONS  (STANDING):  albuterol/ipratropium for Nebulization 3 milliLiter(s) Nebulizer every 6 hours  allopurinol 100 milliGRAM(s) Oral daily  budesonide 160 MICROgram(s)/formoterol 4.5 MICROgram(s) Inhaler 2 Puff(s) Inhalation two times a day  buMETAnide 2 milliGRAM(s) Oral daily  heparin  Infusion. 1000 Unit(s)/Hr (10 mL/Hr) IV Continuous <Continuous>  pantoprazole    Tablet 40 milliGRAM(s) Oral before breakfast  simvastatin 10 milliGRAM(s) Oral at bedtime      Allergies    No Known Allergies    Intolerances        SOCIAL HISTORY:  Denies ETOh,Smoking,     FAMILY HISTORY:  Family history of hypertension (Father, Sibling)    Family history of stroke    Family history of hypertension (Mother)        REVIEW OF SYSTEMS:    CONSTITUTIONAL: No weakness, fevers or chills  EYES/ENT: No visual changes;  No vertigo or throat pain   NECK: No pain or stiffness  RESPIRATORY: No cough, wheezing, hemoptysis; No shortness of breath  CARDIOVASCULAR: No chest pain or palpitations  GASTROINTESTINAL: No abdominal or epigastric pain. No nausea, vomiting, or hematemesis; No diarrhea or constipation. No melena or hematochezia.  GENITOURINARY: No dysuria, frequency or hematuria  NEUROLOGICAL: No numbness or weakness  SKIN: No itching, burning, rashes, or lesions   All other review of systems is negative unless indicated above.    VITAL:  T(C): , Max: 36.6 (04-10-21 @ 03:45)  T(F): , Max: 97.8 (04-10-21 @ 03:45)  HR: 60 (04-10-21 @ 06:00)  BP: 134/65 (04-10-21 @ 06:00)  BP(mean): --  RR: 17 (04-10-21 @ 06:00)  SpO2: 100% (04-10-21 @ 06:00)  Wt(kg): --    I and O's:    04-08 @ 07:01  -  04-09 @ 07:00  --------------------------------------------------------  IN: 900 mL / OUT: 1525 mL / NET: -625 mL    04-09 @ 07:01  -  04-10 @ 07:00  --------------------------------------------------------  IN: 3450 mL / OUT: 700 mL / NET: 2750 mL          PHYSICAL EXAM:    Constitutional: NAD  HEENT: PERRLA,   Neck: No JVD  Respiratory: CTA B/L  Cardiovascular: S1 and S2  Gastrointestinal: BS+, soft, NT/ND  Extremities: No peripheral edema  Neurological: A/O x 3, no focal deficits  Psychiatric: Normal mood, normal affect  : No Junior  Skin: No rashes  Access: Not applicable  Back: No CVA tenderness    LABS:                        7.6    6.62  )-----------( 280      ( 10 Apr 2021 07:13 )             25.9     04-10    136  |  96<L>  |  66<H>  ----------------------------<  114<H>  5.1   |  31  |  1.56<H>    Ca    9.9      10 Apr 2021 07:13  Phos  3.6     04-09  Mg     2.2     04-10            RADIOLOGY & ADDITIONAL STUDIES:                          
Patient is a 70y Female     Patient is a 70y old  Female who presents with a chief complaint of SOB (14 Apr 2021 10:45)      HPI:  70 y.o. F with PMH of morbid obesity, CAD,  HFpEF (EF 65-60%), severe MR and TR s/p mechanical MV and TV repair (on coumadin), Afib s/p failed ablation, HTN, HLD, HTN, MIGUEL, COPD (on 3L O2), CKD3, gout, anemia (on iron pills) who presents with worsening dyspnea since 4 days prior to admission. Patient reports dyspnea worsening with exertion and orthopnea. No increased cough, no increased sputum production, and patient has remained on her baseline of 3L NC. She also has been reporting intermittent dark stools, which she has attributed to taking PO iron. Patient feels she may have been consuming more liquids than her fluid restriction allows and sometimes took additional Lasix PRN at home. She also endorses epigastric pain that has since resolved. ROS negative for fevers, chills, N/V/D, dysuria, or hematuria.     She currently states she is breathing more comfortably after receiving IV Lasix in ED.  (01 Apr 2021 12:05)      PAST MEDICAL & SURGICAL HISTORY:  Atrial fibrillation  S/P Ablation    Pulmonary hypertension    MIGUEL (obstructive sleep apnea)    COPD (chronic obstructive pulmonary disease)  on home O2    CHF (congestive heart failure)    HTN (hypertension)    Gout    Mitral valve replaced    Tricuspid valve replaced    CHF (congestive heart failure)    Hypertension    COPD (chronic obstructive pulmonary disease)    History of mitral valve replacement with mechanical valve    Tricuspid valve replaced  mechanical    No significant past surgical history        MEDICATIONS  (STANDING):  albuterol/ipratropium for Nebulization 3 milliLiter(s) Nebulizer every 12 hours  allopurinol 100 milliGRAM(s) Oral daily  budesonide 160 MICROgram(s)/formoterol 4.5 MICROgram(s) Inhaler 2 Puff(s) Inhalation two times a day  buMETAnide Injectable 2 milliGRAM(s) IV Push two times a day  heparin  Infusion. 1000 Unit(s)/Hr (10 mL/Hr) IV Continuous <Continuous>  pantoprazole    Tablet 40 milliGRAM(s) Oral before breakfast  simvastatin 10 milliGRAM(s) Oral at bedtime      Allergies    No Known Allergies    Intolerances        SOCIAL HISTORY:  Denies ETOh,Smoking,     FAMILY HISTORY:  Family history of hypertension (Father, Sibling)    Family history of stroke    Family history of hypertension (Mother)        REVIEW OF SYSTEMS:    CONSTITUTIONAL: No weakness, fevers or chills  EYES/ENT: No visual changes;  No vertigo or throat pain   NECK: No pain or stiffness  RESPIRATORY: No cough, wheezing, hemoptysis; No shortness of breath  CARDIOVASCULAR: No chest pain or palpitations  GASTROINTESTINAL: No abdominal or epigastric pain. No nausea, vomiting, or hematemesis; No diarrhea or constipation. No melena or hematochezia.  GENITOURINARY: No dysuria, frequency or hematuria  NEUROLOGICAL: No numbness or weakness  SKIN: No itching, burning, rashes, or lesions   All other review of systems is negative unless indicated above.    VITAL:  T(C): , Max: 37.2 (04-15-21 @ 05:38)  T(F): , Max: 99 (04-15-21 @ 05:38)  HR: 74 (04-15-21 @ 05:38)  BP: 132/66 (04-15-21 @ 05:38)  BP(mean): --  RR: 18 (04-15-21 @ 05:38)  SpO2: 100% (04-15-21 @ 05:38)  Wt(kg): --    I and O's:    04-13 @ 07:01  -  04-14 @ 07:00  --------------------------------------------------------  IN: 450 mL / OUT: 2850 mL / NET: -2400 mL    04-14 @ 07:01  -  04-15 @ 07:00  --------------------------------------------------------  IN: 120 mL / OUT: 1700 mL / NET: -1580 mL          PHYSICAL EXAM:    Constitutional: NAD  HEENT: PERRLA,   Neck: No JVD  Respiratory: CTA B/L  Cardiovascular: S1 and S2  Gastrointestinal: BS+, soft, NT/ND  Extremities: No peripheral edema  Neurological: A/O x 3, no focal deficits  Psychiatric: Normal mood, normal affect  : No Junior  Skin: No rashes  Access: Not applicable  Back: No CVA tenderness    LABS:                        8.3    7.87  )-----------( 307      ( 15 Apr 2021 06:22 )             28.4     04-15    135  |  91<L>  |  65<H>  ----------------------------<  148<H>  4.5   |  34<H>  |  1.71<H>    Ca    10.2      15 Apr 2021 06:22  Phos  3.1     04-15  Mg     1.8     04-15            RADIOLOGY & ADDITIONAL STUDIES:                          
[facility] NEPHROLOGY- PROGRESS NOTE    70y Female with history of obesity, severe MR and TR s/p repair, COPD on home O2 presents with dyspnea. Nephrology consulted for elevated Scr.    REVIEW OF SYSTEMS:  Gen: no fevers, + dizziness  Cards: no chest pain  Resp: + dyspnea improving  GI: no nausea or vomiting or diarrhea  Vascular: + LE edema resolved    No Known Allergies      Hospital Medications: Medications reviewed      VITALS:  T(F): 98 (21 @ 05:43), Max: 98.2 (21 @ 17:20)  HR: 65 (21 @ 05:43)  BP: 112/41 (21 @ 05:43)  RR: 18 (21 @ 05:43)  SpO2: 100% (21 @ 05:43)  Wt(kg): --     @ 07:01  -   @ 07:00  --------------------------------------------------------  IN: 236 mL / OUT: 1500 mL / NET: -1264 mL        PHYSICAL EXAM:    Gen: NAD, calm, obese  Cards: RRR, +S1/S2, no M/G/R  Resp: CTA B/L  GI: soft, NT/ND, NABS  Vascular: no LE edema B/L        LABS:      134<L>  |  95<L>  |  73<H>  ----------------------------<  123<H>  5.0   |  32<H>  |  1.71<H>    Ca    9.4      2021 06:15  Phos  4.1     -  Mg     2.3           Creatinine Trend: 1.71 <--, 1.72 <--, 1.81 <--, 1.96 <--, 2.07 <--, 1.84 <--, 1.79 <--, 1.78 <--                        7.2    6.52  )-----------( 264      ( 2021 06:27 )             25.4     Urine Studies:  Urinalysis Basic - ( 2021 09:47 )    Color: Light Yellow / Appearance: Clear / S.012 / pH:   Gluc:  / Ketone: Negative  / Bili: Negative / Urobili: <2 mg/dL   Blood:  / Protein: Negative / Nitrite: Negative   Leuk Esterase: Negative / RBC:  / WBC    Sq Epi:  / Non Sq Epi:  / Bacteria:       Osmolality, Random Urine: 347 mosm/kg ( @ 13:33)  Sodium, Random Urine: 47 mmol/L ( @ 13:33)  Creatinine, Random Urine: 58 mg/dL ( @ 13:33)  Sodium, Random Urine: 72 mmol/L ( @ 09:47)  Potassium, Random Urine: 33.9 mmol/L ( @ 09:47)  Chloride, Random Urine: 53 mmol/L ( @ 09:47)  Creatinine, Random Urine: 56 mg/dL ( @ 09:47)    
DAMARI [id number]  70yFemale  T(C): 36.9 (04-16-21 @ 06:05), Max: 37.3 (04-15-21 @ 17:06)  HR: 69 (04-16-21 @ 08:21) (64 - 74)  BP: 112/64 (04-16-21 @ 06:05) (112/64 - 135/63)  RR: 17 (04-16-21 @ 06:05) (17 - 19)  SpO2: 98% (04-16-21 @ 08:21) (98% - 100%)  Wt(kg): --  04-15 @ 07:01  -  04-16 @ 07:00  --------------------------------------------------------  IN: 1048 mL / OUT: 1900 mL / NET: -852 mL      normal cephalic atraumatic  s1s2   clear to ascultation bilaterally  soft, non tender, non distended no guarding or rebound  no clubbing cyanosis or edema    
[facility] NEPHROLOGY- PROGRESS NOTE    70y Female with history of obesity, severe MR and TR s/p repair, COPD on home O2 presents with dyspnea. Nephrology consulted for elevated Scr.    REVIEW OF SYSTEMS:  Gen: no fevers  Cards: no chest pain  Resp: + dyspnea on exertion; denies at rest  GI: no nausea or vomiting or diarrhea  Vascular: + LE edema resolved    No Known Allergies      Hospital Medications: Medications reviewed    VITALS:  T(F): 98.3 (04-11-21 @ 06:48), Max: 98.3 (04-11-21 @ 06:48)  HR: 62 (04-11-21 @ 06:48)  BP: 123/63 (04-11-21 @ 06:48)  RR: 17 (04-11-21 @ 06:48)  SpO2: 100% (04-11-21 @ 06:48)  Wt(kg): --    04-10 @ 07:01  -  04-11 @ 07:00  --------------------------------------------------------  IN: 478 mL / OUT: 450 mL / NET: 28 mL      PHYSICAL EXAM:    Gen: NAD, calm, obese  Cards: RRR, +S1/S2, no M/G/R  Resp: CTA B/L  GI: soft, NT/ND, NABS  Vascular: no LE edema B/L    LABS:  04-11    134<L>  |  94<L>  |  67<H>  ----------------------------<  104<H>  5.0   |  36<H>  |  1.58<H>    Ca    10.0      11 Apr 2021 06:49  Mg     2.2     04-11      Creatinine Trend: 1.58 <--, 1.56 <--, 1.70 <--, 1.71 <--, 1.72 <--, 1.81 <--, 1.96 <--                        7.9    6.39  )-----------( 269      ( 11 Apr 2021 06:49 )             27.4     Urine Studies:    Osmolality, Random Urine: 347 mosm/kg (04-05 @ 13:33)  Sodium, Random Urine: 47 mmol/L (04-05 @ 13:33)  Creatinine, Random Urine: 58 mg/dL (04-05 @ 13:33)      
Patient is a 70y Female     Patient is a 70y old  Female who presents with a chief complaint of SOB (11 Apr 2021 12:39)      HPI:  70 y.o. F with PMH of morbid obesity, CAD,  HFpEF (EF 65-60%), severe MR and TR s/p mechanical MV and TV repair (on coumadin), Afib s/p failed ablation, HTN, HLD, HTN, MIGUEL, COPD (on 3L O2), CKD3, gout, anemia (on iron pills) who presents with worsening dyspnea since 4 days prior to admission. Patient reports dyspnea worsening with exertion and orthopnea. No increased cough, no increased sputum production, and patient has remained on her baseline of 3L NC. She also has been reporting intermittent dark stools, which she has attributed to taking PO iron. Patient feels she may have been consuming more liquids than her fluid restriction allows and sometimes took additional Lasix PRN at home. She also endorses epigastric pain that has since resolved. ROS negative for fevers, chills, N/V/D, dysuria, or hematuria.     She currently states she is breathing more comfortably after receiving IV Lasix in ED.  (01 Apr 2021 12:05)      PAST MEDICAL & SURGICAL HISTORY:  Atrial fibrillation  S/P Ablation    Pulmonary hypertension    MIGUEL (obstructive sleep apnea)    COPD (chronic obstructive pulmonary disease)  on home O2    CHF (congestive heart failure)    HTN (hypertension)    Gout    Mitral valve replaced    Tricuspid valve replaced    CHF (congestive heart failure)    Hypertension    COPD (chronic obstructive pulmonary disease)    History of mitral valve replacement with mechanical valve    Tricuspid valve replaced  mechanical    No significant past surgical history        MEDICATIONS  (STANDING):  albuterol/ipratropium for Nebulization 3 milliLiter(s) Nebulizer every 6 hours  allopurinol 100 milliGRAM(s) Oral daily  budesonide 160 MICROgram(s)/formoterol 4.5 MICROgram(s) Inhaler 2 Puff(s) Inhalation two times a day  buMETAnide 2 milliGRAM(s) Oral daily  heparin  Infusion. 1000 Unit(s)/Hr (10 mL/Hr) IV Continuous <Continuous>  pantoprazole    Tablet 40 milliGRAM(s) Oral before breakfast  simvastatin 10 milliGRAM(s) Oral at bedtime      Allergies    No Known Allergies    Intolerances        SOCIAL HISTORY:  Denies ETOh,Smoking,     FAMILY HISTORY:  Family history of hypertension (Father, Sibling)    Family history of stroke    Family history of hypertension (Mother)        REVIEW OF SYSTEMS:    CONSTITUTIONAL: No weakness, fevers or chills  EYES/ENT: No visual changes;  No vertigo or throat pain   NECK: No pain or stiffness  RESPIRATORY: No cough, wheezing, hemoptysis; No shortness of breath  CARDIOVASCULAR: No chest pain or palpitations  GASTROINTESTINAL: No abdominal or epigastric pain. No nausea, vomiting, or hematemesis; No diarrhea or constipation. No melena or hematochezia.  GENITOURINARY: No dysuria, frequency or hematuria  NEUROLOGICAL: No numbness or weakness  SKIN: No itching, burning, rashes, or lesions   All other review of systems is negative unless indicated above.    VITAL:  T(C): , Max: 36.8 (04-12-21 @ 05:37)  T(F): , Max: 98.2 (04-12-21 @ 05:37)  HR: 62 (04-12-21 @ 05:37)  BP: 127/67 (04-12-21 @ 05:37)  BP(mean): --  RR: 18 (04-12-21 @ 05:37)  SpO2: 100% (04-12-21 @ 05:37)  Wt(kg): --    I and O's:    04-10 @ 07:01  -  04-11 @ 07:00  --------------------------------------------------------  IN: 478 mL / OUT: 450 mL / NET: 28 mL    04-11 @ 07:01  -  04-12 @ 07:00  --------------------------------------------------------  IN: 714 mL / OUT: 1200 mL / NET: -486 mL          PHYSICAL EXAM:    Constitutional: NAD  HEENT: PERRLA,   Neck: No JVD  Respiratory: CTA B/L  Cardiovascular: S1 and S2  Gastrointestinal: BS+, soft, NT/ND  Extremities: No peripheral edema  Neurological: A/O x 3, no focal deficits  Psychiatric: Normal mood, normal affect  : No Junior  Skin: No rashes  Access: Not applicable  Back: No CVA tenderness    LABS:                        7.9    6.48  )-----------( 294      ( 12 Apr 2021 07:39 )             27.5     04-12    136  |  93<L>  |  61<H>  ----------------------------<  97  5.1   |  36<H>  |  1.55<H>    Ca    10.1      12 Apr 2021 07:39  Mg     2.1     04-12            RADIOLOGY & ADDITIONAL STUDIES:                          
Patient is a 70y Female     Patient is a 70y old  Female who presents with a chief complaint of SOB (12 Apr 2021 22:55)      HPI:  70 y.o. F with PMH of morbid obesity, CAD,  HFpEF (EF 65-60%), severe MR and TR s/p mechanical MV and TV repair (on coumadin), Afib s/p failed ablation, HTN, HLD, HTN, MIGUEL, COPD (on 3L O2), CKD3, gout, anemia (on iron pills) who presents with worsening dyspnea since 4 days prior to admission. Patient reports dyspnea worsening with exertion and orthopnea. No increased cough, no increased sputum production, and patient has remained on her baseline of 3L NC. She also has been reporting intermittent dark stools, which she has attributed to taking PO iron. Patient feels she may have been consuming more liquids than her fluid restriction allows and sometimes took additional Lasix PRN at home. She also endorses epigastric pain that has since resolved. ROS negative for fevers, chills, N/V/D, dysuria, or hematuria.     She currently states she is breathing more comfortably after receiving IV Lasix in ED.  (01 Apr 2021 12:05)      PAST MEDICAL & SURGICAL HISTORY:  Atrial fibrillation  S/P Ablation    Pulmonary hypertension    MIGUEL (obstructive sleep apnea)    COPD (chronic obstructive pulmonary disease)  on home O2    CHF (congestive heart failure)    HTN (hypertension)    Gout    Mitral valve replaced    Tricuspid valve replaced    CHF (congestive heart failure)    Hypertension    COPD (chronic obstructive pulmonary disease)    History of mitral valve replacement with mechanical valve    Tricuspid valve replaced  mechanical    No significant past surgical history        MEDICATIONS  (STANDING):  albuterol/ipratropium for Nebulization 3 milliLiter(s) Nebulizer every 6 hours  allopurinol 100 milliGRAM(s) Oral daily  budesonide 160 MICROgram(s)/formoterol 4.5 MICROgram(s) Inhaler 2 Puff(s) Inhalation two times a day  buMETAnide 2 milliGRAM(s) Oral daily  heparin  Infusion. 1000 Unit(s)/Hr (10 mL/Hr) IV Continuous <Continuous>  pantoprazole    Tablet 40 milliGRAM(s) Oral before breakfast  simvastatin 10 milliGRAM(s) Oral at bedtime      Allergies    No Known Allergies    Intolerances        SOCIAL HISTORY:  Denies ETOh,Smoking,     FAMILY HISTORY:  Family history of hypertension (Father, Sibling)    Family history of stroke    Family history of hypertension (Mother)        REVIEW OF SYSTEMS:    CONSTITUTIONAL: No weakness, fevers or chills  EYES/ENT: No visual changes;  No vertigo or throat pain   NECK: No pain or stiffness  RESPIRATORY: No cough, wheezing, hemoptysis; No shortness of breath  CARDIOVASCULAR: No chest pain or palpitations  GASTROINTESTINAL: No abdominal or epigastric pain. No nausea, vomiting, or hematemesis; No diarrhea or constipation. No melena or hematochezia.  GENITOURINARY: No dysuria, frequency or hematuria  NEUROLOGICAL: No numbness or weakness  SKIN: No itching, burning, rashes, or lesions   All other review of systems is negative unless indicated above.    VITAL:  T(C): , Max: 36.7 (04-12-21 @ 13:30)  T(F): , Max: 98.1 (04-12-21 @ 13:30)  HR: 65 (04-12-21 @ 22:29)  BP: 131/64 (04-13-21 @ 05:34)  BP(mean): --  RR: 17 (04-13-21 @ 05:34)  SpO2: 100% (04-13-21 @ 05:34)  Wt(kg): --    I and O's:    04-11 @ 07:01  -  04-12 @ 07:00  --------------------------------------------------------  IN: 714 mL / OUT: 1200 mL / NET: -486 mL    04-12 @ 07:01  -  04-13 @ 07:00  --------------------------------------------------------  IN: 400 mL / OUT: 1375 mL / NET: -975 mL          PHYSICAL EXAM:    Constitutional: NAD  HEENT: PERRLA,   Neck: No JVD  Respiratory: CTA B/L  Cardiovascular: S1 and S2  Gastrointestinal: BS+, soft, NT/ND  Extremities: No peripheral edema  Neurological: A/O x 3, no focal deficits  Psychiatric: Normal mood, normal affect  : No Junior  Skin: No rashes  Access: Not applicable  Back: No CVA tenderness    LABS:                        7.9    6.48  )-----------( 294      ( 12 Apr 2021 07:39 )             27.5     04-12    136  |  93<L>  |  61<H>  ----------------------------<  97  5.1   |  36<H>  |  1.55<H>    Ca    10.1      12 Apr 2021 07:39  Mg     2.1     04-12            RADIOLOGY & ADDITIONAL STUDIES:                          
[facility] NEPHROLOGY- PROGRESS NOTE    70y Female with history of obesity, severe MR and TR s/p repair, COPD on home O2 presents with dyspnea. Nephrology consulted for elevated Scr.    REVIEW OF SYSTEMS:  Gen: no fevers  Cards: no chest pain  Resp: + dyspnea on exertion; denies at rest  GI: no nausea or vomiting or diarrhea  Vascular: + LE edema resolved    No Known Allergies      Hospital Medications: Medications reviewed    VITALS:  T(F): 98.1 (04-12-21 @ 21:20), Max: 98.2 (04-12-21 @ 05:37)  HR: 65 (04-12-21 @ 22:29)  BP: 136/67 (04-12-21 @ 21:20)  RR: 19 (04-12-21 @ 21:20)  SpO2: 99% (04-12-21 @ 22:29)  Wt(kg): --    04-11 @ 07:01  -  04-12 @ 07:00  --------------------------------------------------------  IN: 714 mL / OUT: 1200 mL / NET: -486 mL    04-12 @ 07:01  -  04-12 @ 22:56  --------------------------------------------------------  IN: 400 mL / OUT: 1375 mL / NET: -975 mL      PHYSICAL EXAM:  Gen: NAD, calm, obese  Cards: RRR, +S1/S2, no M/G/R  Resp: CTA B/L  GI: soft, NT/ND, NABS  Vascular: no LE edema B/L    LABS:  04-12    136  |  93<L>  |  61<H>  ----------------------------<  97  5.1   |  36<H>  |  1.55<H>    Ca    10.1      12 Apr 2021 07:39  Mg     2.1     04-12      Creatinine Trend: 1.55 <--, 1.58 <--, 1.56 <--, 1.70 <--, 1.71 <--, 1.72 <--, 1.81 <--                        7.9    6.48  )-----------( 294      ( 12 Apr 2021 07:39 )             27.5           
[facility] NEPHROLOGY- PROGRESS NOTE    70y Female with history of obesity, severe MR and TR s/p repair, COPD on home O2 presents with dyspnea. Nephrology consulted for elevated Scr.    REVIEW OF SYSTEMS:  Gen: no fevers  Cards: no chest pain  Resp: + dyspnea on exertion  GI: no nausea or vomiting or diarrhea  Vascular: + LE edema resolved    No Known Allergies      Hospital Medications: Medications reviewed    VITALS:  T(F): 97.4 (04-10-21 @ 06:00), Max: 97.8 (04-10-21 @ 03:45)  HR: 57 (04-10-21 @ 11:03)  BP: 134/65 (04-10-21 @ 06:00)  RR: 17 (04-10-21 @ 06:00)  SpO2: 94% (04-10-21 @ 11:03)  Wt(kg): --     @ 07:01  -  04-10 @ 07:00  --------------------------------------------------------  IN: 3450 mL / OUT: 700 mL / NET: 2750 mL      PHYSICAL EXAM:    Gen: NAD, calm, obese  Cards: RRR, +S1/S2, no M/G/R  Resp: CTA B/L  GI: soft, NT/ND, NABS  Vascular: no LE edema B/L    LABS:  04-10    136  |  96<L>  |  66<H>  ----------------------------<  114<H>  5.1   |  31  |  1.56<H>    Ca    9.9      10 Apr 2021 07:13  Phos  3.6     -09  Mg     2.2     04-10      Creatinine Trend: 1.56 <--, 1.70 <--, 1.71 <--, 1.72 <--, 1.81 <--, 1.96 <--, 2.07 <--, 1.84 <--                        7.6    6.62  )-----------( 280      ( 10 Apr 2021 07:13 )             25.9     Urine Studies:  Urinalysis Basic - ( 2021 09:47 )    Color: Light Yellow / Appearance: Clear / S.012 / pH:   Gluc:  / Ketone: Negative  / Bili: Negative / Urobili: <2 mg/dL   Blood:  / Protein: Negative / Nitrite: Negative   Leuk Esterase: Negative / RBC:  / WBC    Sq Epi:  / Non Sq Epi:  / Bacteria:       Osmolality, Random Urine: 347 mosm/kg ( @ 13:33)  Sodium, Random Urine: 47 mmol/L ( @ 13:33)  Creatinine, Random Urine: 58 mg/dL ( @ 13:33)  Sodium, Random Urine: 72 mmol/L ( @ 09:47)  Potassium, Random Urine: 33.9 mmol/L ( @ 09:47)  Chloride, Random Urine: 53 mmol/L ( @ 09:47)  Creatinine, Random Urine: 56 mg/dL ( @ 09:47)    
    SUBJECTIVE / OVERNIGHT EVENTS: pt says she feels much better     MEDICATIONS  (STANDING):  albuterol/ipratropium for Nebulization 3 milliLiter(s) Nebulizer every 6 hours  allopurinol 100 milliGRAM(s) Oral daily  budesonide 160 MICROgram(s)/formoterol 4.5 MICROgram(s) Inhaler 2 Puff(s) Inhalation two times a day  buMETAnide 2 milliGRAM(s) Oral daily  heparin  Infusion. 1300 Unit(s)/Hr (13 mL/Hr) IV Continuous <Continuous>  pantoprazole    Tablet 40 milliGRAM(s) Oral before breakfast  simvastatin 10 milliGRAM(s) Oral at bedtime    MEDICATIONS  (PRN):  aluminum hydroxide/magnesium hydroxide/simethicone Suspension 30 milliLiter(s) Oral every 6 hours PRN Dyspepsia  heparin   Injectable 8500 Unit(s) IV Push every 6 hours PRN For aPTT less than 40  heparin   Injectable 4000 Unit(s) IV Push every 6 hours PRN For aPTT between 40 - 57  sodium chloride 0.65% Nasal 2 Spray(s) Both Nostrils four times a day PRN Nasal Congestion    Vital Signs Last 24 Hrs  T(C): 36.8 (21 @ 14:39), Max: 36.8 (21 @ 09:43)  T(F): 98.3 (21 @ 14:39), Max: 98.3 (21 @ 09:43)  HR: 80 (21 @ 15:38) (64 - 81)  BP: 127/57 (21 @ 14:39) (112/41 - 156/80)  RR: 18 (21 @ 14:39) (18 - 18)  SpO2: 96% (21 @ 15:38) (96% - 100%)  Wt(kg): --    Constitutional: No fever, fatigue  Skin: No rash.  Eyes: No recent vision problems or eye pain.  ENT: No congestion, ear pain, or sore throat.  Cardiovascular: No chest pain or palpation.  Respiratory: No cough, shortness of breath, congestion, or wheezing.  Gastrointestinal: No abdominal pain, nausea, vomiting, or diarrhea.  Genitourinary: No dysuria.  Musculoskeletal: No joint swelling.  Neurologic: No headache.    PHYSICAL EXAM:  GENERAL: NAD  EYES: EOMI, PERRLA  NECK: Supple, No JVD  CHEST/LUNG: crackles at bases  HEART:  S1 , S2 +  ABDOMEN: soft , bs+  EXTREMITIES:  edema+  NEUROLOGY:alert awake     LABS:      134<L>  |  95<L>  |  73<H>  ----------------------------<  123<H>  5.0   |  32<H>  |  1.71<H>    Ca    9.4      2021 06:15  Phos  4.1       Mg     2.3           Creatinine Trend: 1.71 <--, 1.72 <--, 1.81 <--, 1.96 <--, 2.07 <--, 1.84 <--, 1.79 <--, 1.78 <--                        8.0    6.61  )-----------( 285      ( 2021 21:11 )             26.9     Urine Studies:  Urinalysis Basic - ( 2021 09:47 )    Color: Light Yellow / Appearance: Clear / S.012 / pH:   Gluc:  / Ketone: Negative  / Bili: Negative / Urobili: <2 mg/dL   Blood:  / Protein: Negative / Nitrite: Negative   Leuk Esterase: Negative / RBC:  / WBC    Sq Epi:  / Non Sq Epi:  / Bacteria:       Osmolality, Random Urine: 347 mosm/kg ( @ 13:33)  Sodium, Random Urine: 47 mmol/L ( @ 13:33)  Creatinine, Random Urine: 58 mg/dL ( @ 13:33)  Sodium, Random Urine: 72 mmol/L ( @ 09:47)  Potassium, Random Urine: 33.9 mmol/L ( @ 09:47)  Chloride, Random Urine: 53 mmol/L ( @ 09:47)  Creatinine, Random Urine: 56 mg/dL ( @ 09:47)            PT/INR - ( 2021 06:15 )   PT: 15.8 sec;   INR: 1.40 ratio         PTT - ( 2021 06:15 )  PTT:87.1 sec  
[facility] NEPHROLOGY- PROGRESS NOTE    70y Female with history of obesity, severe MR and TR s/p repair, COPD on home O2 presents with dyspnea. Nephrology consulted for elevated Scr.    REVIEW OF SYSTEMS:  Gen: no fevers  Cards: no chest pain  Resp: + dyspnea improving  GI: no nausea or vomiting or diarrhea  Vascular: + LE edema improving    No Known Allergies      Hospital Medications: Medications reviewed      VITALS:  T(F): 98.2 (21 @ 05:12), Max: 98.2 (21 @ 13:17)  HR: 60 (21 @ 05:12)  BP: 128/54 (21 @ 05:12)  RR: 18 (21 @ 05:12)  SpO2: 100% (21 @ 05:12)  Wt(kg): --     @ 07:01  -   @ 07:00  --------------------------------------------------------  IN: 1055 mL / OUT: 1400 mL / NET: -345 mL      PHYSICAL EXAM:    Gen: NAD, calm, obese  Cards: RRR, +S1/S2, no M/G/R  Resp: CTA B/L  GI: soft, NT/ND, NABS  Vascular: no LE edema B/L      LABS:      134<L>  |  93<L>  |  79<H>  ----------------------------<  102<H>  4.6   |  33<H>  |  1.96<H>    Ca    9.2      2021 07:53  Mg     2.4           Creatinine Trend: 1.96 <--, 2.07 <--, 1.84 <--, 1.79 <--, 1.78 <--, 1.63 <--                        7.4    6.74  )-----------( 265      ( 2021 07:53 )             25.2     Urine Studies:  Urinalysis Basic - ( 2021 09:47 )    Color: Light Yellow / Appearance: Clear / S.012 / pH:   Gluc:  / Ketone: Negative  / Bili: Negative / Urobili: <2 mg/dL   Blood:  / Protein: Negative / Nitrite: Negative   Leuk Esterase: Negative / RBC:  / WBC    Sq Epi:  / Non Sq Epi:  / Bacteria:       Osmolality, Random Urine: 347 mosm/kg ( @ 13:33)  Sodium, Random Urine: 47 mmol/L ( @ 13:33)  Creatinine, Random Urine: 58 mg/dL ( @ 13:33)  Sodium, Random Urine: 72 mmol/L ( @ 09:47)  Potassium, Random Urine: 33.9 mmol/L ( @ 09:47)  Chloride, Random Urine: 53 mmol/L ( @ 09:47)  Creatinine, Random Urine: 56 mg/dL ( @ 09:47)        rFe
[facility] NEPHROLOGY- PROGRESS NOTE    70y Female with history of obesity, severe MR and TR s/p repair, COPD on home O2 presents with dyspnea. Nephrology consulted for elevated Scr.    REVIEW OF SYSTEMS:  Gen: + fevers  Cards: no chest pain  Resp: + dyspnea improving  GI: no nausea or vomiting or diarrhea  Vascular: + LE edema improving    No Known Allergies      Hospital Medications: Medications reviewed    VITALS:  T(F): 98 (21 @ 06:34), Max: 98.2 (21 @ 22:23)  HR: 68 (21 @ 06:34)  BP: 108/53 (21 @ 06:34)  RR: 17 (21 @ 06:34)  SpO2: 100% (21 @ 06:34)  Wt(kg): --  Height (cm): 162.6 ( @ 01:07)  Weight (kg): 104.326 ( 11:45)  BMI (kg/m2): 39.5 ( 11:45)  BSA (m2): 2.08 ( 11:45)     @ 07:01  -   @ 07:00  --------------------------------------------------------  IN: 0 mL / OUT: 1500 mL / NET: -1500 mL        PHYSICAL EXAM:    Gen: NAD, calm, obese  Cards: RRR, +S1/S2, no M/G/R  Resp: mild basilar rales  GI: soft, NT/ND, NABS  Vascular: trace LE edema B/L    LABS:      134<L>  |  93<L>  |  79<H>  ----------------------------<  102<H>  4.6   |  33<H>  |  1.96<H>    Ca    9.2      2021 07:53  Phos  3.8     04-03  Mg     2.4     -05      Creatinine Trend: 1.96 <--, 2.07 <--, 1.84 <--, 1.79 <--, 1.78 <--, 1.63 <--                        7.4    6.74  )-----------( 265      ( 2021 07:53 )             25.2     Urine Studies:  Urinalysis Basic - ( 2021 09:47 )    Color: Light Yellow / Appearance: Clear / S.012 / pH:   Gluc:  / Ketone: Negative  / Bili: Negative / Urobili: <2 mg/dL   Blood:  / Protein: Negative / Nitrite: Negative   Leuk Esterase: Negative / RBC:  / WBC    Sq Epi:  / Non Sq Epi:  / Bacteria:       Sodium, Random Urine: 72 mmol/L ( @ 09:47)  Potassium, Random Urine: 33.9 mmol/L ( @ 09:47)  Chloride, Random Urine: 53 mmol/L ( @ 09:47)  Creatinine, Random Urine: 56 mg/dL ( @ 09:47)      RADIOLOGY & ADDITIONAL STUDIES:    < from: Xray Chest 1 View- PORTABLE-Urgent (21 @ 03:56) >    IMPRESSION: Bilateral perihilar opacities with cardiomegaly similar to the last exam consistent with CHF.    < end of copied text >  
    SUBJECTIVE / OVERNIGHT EVENTS: pt says she feels much better       MEDICATIONS  (STANDING):  albuterol/ipratropium for Nebulization 3 milliLiter(s) Nebulizer every 6 hours  allopurinol 100 milliGRAM(s) Oral daily  budesonide 160 MICROgram(s)/formoterol 4.5 MICROgram(s) Inhaler 2 Puff(s) Inhalation two times a day  furosemide   Injectable 40 milliGRAM(s) IV Push two times a day  heparin  Infusion. 1300 Unit(s)/Hr (13 mL/Hr) IV Continuous <Continuous>  lisinopril 10 milliGRAM(s) Oral daily  pantoprazole    Tablet 40 milliGRAM(s) Oral before breakfast  simvastatin 10 milliGRAM(s) Oral at bedtime    MEDICATIONS  (PRN):  aluminum hydroxide/magnesium hydroxide/simethicone Suspension 30 milliLiter(s) Oral every 6 hours PRN Dyspepsia  heparin   Injectable 8500 Unit(s) IV Push every 6 hours PRN For aPTT less than 40  heparin   Injectable 4000 Unit(s) IV Push every 6 hours PRN For aPTT between 40 - 57  oxymetazoline 0.05% Nasal Spray 2 Spray(s) Both Nostrils every 12 hours PRN Nose bleeds  sodium chloride 0.65% Nasal 2 Spray(s) Both Nostrils four times a day PRN Nasal Congestion      Vital Signs Last 24 Hrs  T(C): 36.7 (04-03-21 @ 18:42), Max: 36.8 (04-02-21 @ 22:21)  T(F): 98 (04-03-21 @ 18:42), Max: 98.2 (04-02-21 @ 22:21)  HR: 68 (04-03-21 @ 18:42) (56 - 74)  BP: 130/60 (04-03-21 @ 18:42) (115/36 - 143/69)  RR: 16 (04-03-21 @ 18:42) (16 - 19)  SpO2: 100% (04-03-21 @ 18:42) (94% - 100%)  Wt(kg): --  CAPILLARY BLOOD GLUCOSE        I&O's Summary    02 Apr 2021 07:01  -  03 Apr 2021 07:00  --------------------------------------------------------  IN: 0 mL / OUT: 750 mL / NET: -750 mL    03 Apr 2021 07:01  -  03 Apr 2021 20:51  --------------------------------------------------------  IN: 718 mL / OUT: 0 mL / NET: 718 mL        Constitutional: No fever, fatigue  Skin: No rash.  Eyes: No recent vision problems or eye pain.  ENT: No congestion, ear pain, or sore throat.  Cardiovascular: No chest pain or palpation.  Respiratory: No cough, shortness of breath, congestion, or wheezing.  Gastrointestinal: No abdominal pain, nausea, vomiting, or diarrhea.  Genitourinary: No dysuria.  Musculoskeletal: No joint swelling.  Neurologic: No headache.    PHYSICAL EXAM:  GENERAL: NAD  EYES: EOMI, PERRLA  NECK: Supple, No JVD  CHEST/LUNG: crackles at bases  HEART:  S1 , S2 +  ABDOMEN: soft , bs+  EXTREMITIES:  edema+  NEUROLOGY:alert awake      LABS:                        7.8    6.41  )-----------( 270      ( 03 Apr 2021 03:07 )             27.0     04-03    138  |  95<L>  |  68<H>  ----------------------------<  91  5.1   |  38<H>  |  1.84<H>    Ca    10.1      03 Apr 2021 12:54  Phos  3.8     04-03  Mg     2.5     04-03      PT/INR - ( 03 Apr 2021 12:54 )   PT: 16.2 sec;   INR: 1.45 ratio         PTT - ( 03 Apr 2021 12:54 )  PTT:158.6 sec          RADIOLOGY & ADDITIONAL TESTS:    Imaging Personally Reviewed:    Consultant(s) Notes Reviewed:      Care Discussed with Consultants/Other Providers:  
    SUBJECTIVE / OVERNIGHT EVENTS: pt says she feels much better     MEDICATIONS  (STANDING):  albuterol/ipratropium for Nebulization 3 milliLiter(s) Nebulizer every 6 hours  allopurinol 100 milliGRAM(s) Oral daily  budesonide 160 MICROgram(s)/formoterol 4.5 MICROgram(s) Inhaler 2 Puff(s) Inhalation two times a day  buMETAnide 2 milliGRAM(s) Oral daily  heparin  Infusion. 1000 Unit(s)/Hr (10 mL/Hr) IV Continuous <Continuous>  pantoprazole    Tablet 40 milliGRAM(s) Oral before breakfast  simvastatin 10 milliGRAM(s) Oral at bedtime    MEDICATIONS  (PRN):  aluminum hydroxide/magnesium hydroxide/simethicone Suspension 30 milliLiter(s) Oral every 6 hours PRN Dyspepsia  heparin   Injectable 8500 Unit(s) IV Push every 6 hours PRN For aPTT less than 40  heparin   Injectable 4000 Unit(s) IV Push every 6 hours PRN For aPTT between 40 - 57  sodium chloride 0.65% Nasal 2 Spray(s) Both Nostrils four times a day PRN Nasal Congestion    Vital Signs Last 24 Hrs  T(C): 36.6 (11 Apr 2021 22:09), Max: 36.8 (11 Apr 2021 06:48)  T(F): 97.9 (11 Apr 2021 22:09), Max: 98.3 (11 Apr 2021 06:48)  HR: 56 (11 Apr 2021 22:46) (54 - 68)  BP: 111/64 (11 Apr 2021 22:09) (111/64 - 137/64)  BP(mean): --  RR: 17 (11 Apr 2021 22:09) (17 - 18)  SpO2: 98% (11 Apr 2021 22:46) (94% - 100%)    Constitutional: No fever, fatigue  Skin: No rash.  Eyes: No recent vision problems or eye pain.  ENT: No congestion, ear pain, or sore throat.  Cardiovascular: No chest pain or palpation.  Respiratory: No cough, shortness of breath, congestion, or wheezing.  Gastrointestinal: No abdominal pain, nausea, vomiting, or diarrhea.  Genitourinary: No dysuria.  Musculoskeletal: No joint swelling.  Neurologic: No headache.    PHYSICAL EXAM:  GENERAL: NAD  EYES: EOMI, PERRLA  NECK: Supple, No JVD  CHEST/LUNG: crackles at bases  HEART:  S1 , S2 +  ABDOMEN: soft , bs+  EXTREMITIES:  edema+  NEUROLOGY:alert awake     LABS:  04-11    134<L>  |  94<L>  |  67<H>  ----------------------------<  104<H>  5.0   |  36<H>  |  1.58<H>    Ca    10.0      11 Apr 2021 06:49  Mg     2.2     04-11      Creatinine Trend: 1.58 <--, 1.56 <--, 1.70 <--, 1.71 <--, 1.72 <--, 1.81 <--, 1.96 <--                        7.9    6.39  )-----------( 269      ( 11 Apr 2021 06:49 )             27.4     Urine Studies:    Osmolality, Random Urine: 347 mosm/kg (04-05 @ 13:33)  Sodium, Random Urine: 47 mmol/L (04-05 @ 13:33)  Creatinine, Random Urine: 58 mg/dL (04-05 @ 13:33)    CARDIAC MARKERS ( 11 Apr 2021 14:39 )  x     / x     / 37 U/L / x     / 1.0 ng/mL  CARDIAC MARKERS ( 11 Apr 2021 06:58 )  x     / x     / 37 U/L / x     / 1.0 ng/mL          PT/INR - ( 11 Apr 2021 06:49 )   PT: 18.9 sec;   INR: 1.70 ratio         PTT - ( 11 Apr 2021 02:02 )  PTT:71.6 sec  
    SUBJECTIVE / OVERNIGHT EVENTS: pt says she feels much better     MEDICATIONS  (STANDING):  albuterol/ipratropium for Nebulization 3 milliLiter(s) Nebulizer every 6 hours  allopurinol 100 milliGRAM(s) Oral daily  budesonide 160 MICROgram(s)/formoterol 4.5 MICROgram(s) Inhaler 2 Puff(s) Inhalation two times a day  buMETAnide 2 milliGRAM(s) Oral daily  heparin  Infusion. 1000 Unit(s)/Hr (10 mL/Hr) IV Continuous <Continuous>  pantoprazole    Tablet 40 milliGRAM(s) Oral before breakfast  simvastatin 10 milliGRAM(s) Oral at bedtime    MEDICATIONS  (PRN):  aluminum hydroxide/magnesium hydroxide/simethicone Suspension 30 milliLiter(s) Oral every 6 hours PRN Dyspepsia  heparin   Injectable 8500 Unit(s) IV Push every 6 hours PRN For aPTT less than 40  heparin   Injectable 4000 Unit(s) IV Push every 6 hours PRN For aPTT between 40 - 57  sodium chloride 0.65% Nasal 2 Spray(s) Both Nostrils four times a day PRN Nasal Congestion    Vital Signs Last 24 Hrs  T(C): 36.3 (2021 21:18), Max: 37 (2021 10:16)  T(F): 97.3 (2021 21:18), Max: 98.6 (2021 10:16)  HR: 65 (2021 22:33) (61 - 87)  BP: 153/72 (2021 21:18) (110/54 - 153/72)  BP(mean): --  RR: 17 (2021 21:18) (17 - 18)  SpO2: 95% (2021 22:33) (95% - 100%)  Constitutional: No fever, fatigue  Skin: No rash.  Eyes: No recent vision problems or eye pain.  ENT: No congestion, ear pain, or sore throat.  Cardiovascular: No chest pain or palpation.  Respiratory: No cough, shortness of breath, congestion, or wheezing.  Gastrointestinal: No abdominal pain, nausea, vomiting, or diarrhea.  Genitourinary: No dysuria.  Musculoskeletal: No joint swelling.  Neurologic: No headache.    PHYSICAL EXAM:  GENERAL: NAD  EYES: EOMI, PERRLA  NECK: Supple, No JVD  CHEST/LUNG: crackles at bases  HEART:  S1 , S2 +  ABDOMEN: soft , bs+  EXTREMITIES:  edema+  NEUROLOGY:alert awake     LABS:      134<L>  |  94<L>  |  70<H>  ----------------------------<  170<H>  5.1   |  33<H>  |  1.70<H>    Ca    9.8      2021 07:24  Phos  3.6       Mg     2.2           Creatinine Trend: 1.70 <--, 1.71 <--, 1.72 <--, 1.81 <--, 1.96 <--, 2.07 <--, 1.84 <--, 1.79 <--                        7.6    6.61  )-----------( 278      ( 2021 07:24 )             25.8     Urine Studies:  Urinalysis Basic - ( 2021 09:47 )    Color: Light Yellow / Appearance: Clear / S.012 / pH:   Gluc:  / Ketone: Negative  / Bili: Negative / Urobili: <2 mg/dL   Blood:  / Protein: Negative / Nitrite: Negative   Leuk Esterase: Negative / RBC:  / WBC    Sq Epi:  / Non Sq Epi:  / Bacteria:       Osmolality, Random Urine: 347 mosm/kg ( @ 13:33)  Sodium, Random Urine: 47 mmol/L ( @ 13:33)  Creatinine, Random Urine: 58 mg/dL ( @ 13:33)  Sodium, Random Urine: 72 mmol/L ( @ 09:47)  Potassium, Random Urine: 33.9 mmol/L ( @ 09:47)  Chloride, Random Urine: 53 mmol/L ( @ 09:47)  Creatinine, Random Urine: 56 mg/dL ( @ 09:47)            PT/INR - ( 2021 07:24 )   PT: 16.5 sec;   INR: 1.47 ratio         PTT - ( 2021 09:53 )  PTT:97.5 sec  
    SUBJECTIVE / OVERNIGHT EVENTS: pt says she feels much better     MEDICATIONS  (STANDING):  albuterol/ipratropium for Nebulization 3 milliLiter(s) Nebulizer every 6 hours  allopurinol 100 milliGRAM(s) Oral daily  budesonide 160 MICROgram(s)/formoterol 4.5 MICROgram(s) Inhaler 2 Puff(s) Inhalation two times a day  furosemide   Injectable 40 milliGRAM(s) IV Push two times a day  heparin  Infusion. 1300 Unit(s)/Hr (13 mL/Hr) IV Continuous <Continuous>  pantoprazole    Tablet 40 milliGRAM(s) Oral before breakfast  simvastatin 10 milliGRAM(s) Oral at bedtime  warfarin 3 milliGRAM(s) Oral once    MEDICATIONS  (PRN):  aluminum hydroxide/magnesium hydroxide/simethicone Suspension 30 milliLiter(s) Oral every 6 hours PRN Dyspepsia  heparin   Injectable 8500 Unit(s) IV Push every 6 hours PRN For aPTT less than 40  heparin   Injectable 4000 Unit(s) IV Push every 6 hours PRN For aPTT between 40 - 57  oxymetazoline 0.05% Nasal Spray 2 Spray(s) Both Nostrils every 12 hours PRN Nose bleeds  sodium chloride 0.65% Nasal 2 Spray(s) Both Nostrils four times a day PRN Nasal Congestion    Vital Signs Last 24 Hrs  T(C): 36.7 (2021 13:14), Max: 36.8 (2021 05:49)  T(F): 98 (2021 13:14), Max: 98.2 (2021 05:49)  HR: 70 (2021 15:55) (65 - 92)  BP: 130/60 (2021 13:14) (109/52 - 145/55)  BP(mean): --  RR: 18 (2021 13:14) (16 - 18)  SpO2: 96% (2021 15:55) (95% - 100%)      Constitutional: No fever, fatigue  Skin: No rash.  Eyes: No recent vision problems or eye pain.  ENT: No congestion, ear pain, or sore throat.  Cardiovascular: No chest pain or palpation.  Respiratory: No cough, shortness of breath, congestion, or wheezing.  Gastrointestinal: No abdominal pain, nausea, vomiting, or diarrhea.  Genitourinary: No dysuria.  Musculoskeletal: No joint swelling.  Neurologic: No headache.    PHYSICAL EXAM:  GENERAL: NAD  EYES: EOMI, PERRLA  NECK: Supple, No JVD  CHEST/LUNG: crackles at bases  HEART:  S1 , S2 +  ABDOMEN: soft , bs+  EXTREMITIES:  edema+  NEUROLOGY:alert awake    LABS:      132<L>  |  89<L>  |  75<H>  ----------------------------<  97  4.7   |  37<H>  |  2.07<H>    Ca    9.7      2021 04:26  Phos  3.8       Mg     2.5           Creatinine Trend: 2.07 <--, 1.84 <--, 1.79 <--, 1.78 <--, 1.63 <--                        7.7    7.47  )-----------( 288      ( 2021 04:26 )             27.1     Urine Studies:  Urinalysis Basic - ( 2021 09:47 )    Color: Light Yellow / Appearance: Clear / S.012 / pH:   Gluc:  / Ketone: Negative  / Bili: Negative / Urobili: <2 mg/dL   Blood:  / Protein: Negative / Nitrite: Negative   Leuk Esterase: Negative / RBC:  / WBC    Sq Epi:  / Non Sq Epi:  / Bacteria:       Sodium, Random Urine: 72 mmol/L ( @ 09:47)  Potassium, Random Urine: 33.9 mmol/L ( @ 09:47)  Chloride, Random Urine: 53 mmol/L ( @ 09:47)  Creatinine, Random Urine: 56 mg/dL ( @ 09:47)            PT/INR - ( 2021 11:32 )   PT: 15.2 sec;   INR: 1.35 ratio         PTT - ( 2021 11:32 )  PTT:77.8 sec  
    SUBJECTIVE / OVERNIGHT EVENTS: pt says she feels much better     MEDICATIONS  (STANDING):  albuterol/ipratropium for Nebulization 3 milliLiter(s) Nebulizer every 6 hours  allopurinol 100 milliGRAM(s) Oral daily  budesonide 160 MICROgram(s)/formoterol 4.5 MICROgram(s) Inhaler 2 Puff(s) Inhalation two times a day  buMETAnide 2 milliGRAM(s) Oral daily  heparin  Infusion. 1000 Unit(s)/Hr (10 mL/Hr) IV Continuous <Continuous>  pantoprazole    Tablet 40 milliGRAM(s) Oral before breakfast  simvastatin 10 milliGRAM(s) Oral at bedtime    MEDICATIONS  (PRN):  aluminum hydroxide/magnesium hydroxide/simethicone Suspension 30 milliLiter(s) Oral every 6 hours PRN Dyspepsia  heparin   Injectable 8500 Unit(s) IV Push every 6 hours PRN For aPTT less than 40  heparin   Injectable 4000 Unit(s) IV Push every 6 hours PRN For aPTT between 40 - 57  sodium chloride 0.65% Nasal 2 Spray(s) Both Nostrils four times a day PRN Nasal Congestion    Vital Signs Last 24 Hrs  T(C): 36.7 (12 Apr 2021 21:20), Max: 36.8 (12 Apr 2021 05:37)  T(F): 98.1 (12 Apr 2021 21:20), Max: 98.2 (12 Apr 2021 05:37)  HR: 63 (12 Apr 2021 21:20) (56 - 77)  BP: 136/67 (12 Apr 2021 21:20) (111/64 - 136/67)  BP(mean): --  RR: 19 (12 Apr 2021 21:20) (17 - 19)  SpO2: 100% (12 Apr 2021 21:20) (98% - 100%)    Constitutional: No fever, fatigue  Skin: No rash.  Eyes: No recent vision problems or eye pain.  ENT: No congestion, ear pain, or sore throat.  Cardiovascular: No chest pain or palpation.  Respiratory: No cough, shortness of breath, congestion, or wheezing.  Gastrointestinal: No abdominal pain, nausea, vomiting, or diarrhea.  Genitourinary: No dysuria.  Musculoskeletal: No joint swelling.  Neurologic: No headache.    PHYSICAL EXAM:  GENERAL: NAD  EYES: EOMI, PERRLA  NECK: Supple, No JVD  CHEST/LUNG: crackles at bases  HEART:  S1 , S2 +  ABDOMEN: soft , bs+  EXTREMITIES:  edema+  NEUROLOGY:alert awake     LABS:  04-12    136  |  93<L>  |  61<H>  ----------------------------<  97  5.1   |  36<H>  |  1.55<H>    Ca    10.1      12 Apr 2021 07:39  Mg     2.1     04-12      Creatinine Trend: 1.55 <--, 1.58 <--, 1.56 <--, 1.70 <--, 1.71 <--, 1.72 <--, 1.81 <--                        7.9    6.48  )-----------( 294      ( 12 Apr 2021 07:39 )             27.5     Urine Studies:      CARDIAC MARKERS ( 11 Apr 2021 14:39 )  x     / x     / 37 U/L / x     / 1.0 ng/mL  CARDIAC MARKERS ( 11 Apr 2021 06:58 )  x     / x     / 37 U/L / x     / 1.0 ng/mL          PT/INR - ( 12 Apr 2021 07:39 )   PT: 19.1 sec;   INR: 1.70 ratio         PTT - ( 12 Apr 2021 07:39 )  PTT:70.6 sec  
    SUBJECTIVE / OVERNIGHT EVENTS: pt says she feels much better     MEDICATIONS  (STANDING):  albuterol/ipratropium for Nebulization 3 milliLiter(s) Nebulizer every 12 hours  allopurinol 100 milliGRAM(s) Oral daily  budesonide 160 MICROgram(s)/formoterol 4.5 MICROgram(s) Inhaler 2 Puff(s) Inhalation two times a day  buMETAnide Injectable 2 milliGRAM(s) IV Push two times a day  heparin  Infusion. 1000 Unit(s)/Hr (10 mL/Hr) IV Continuous <Continuous>  pantoprazole    Tablet 40 milliGRAM(s) Oral before breakfast  simvastatin 10 milliGRAM(s) Oral at bedtime    MEDICATIONS  (PRN):  aluminum hydroxide/magnesium hydroxide/simethicone Suspension 30 milliLiter(s) Oral every 6 hours PRN Dyspepsia  heparin   Injectable 8500 Unit(s) IV Push every 6 hours PRN For aPTT less than 40  heparin   Injectable 4000 Unit(s) IV Push every 6 hours PRN For aPTT between 40 - 57  sodium chloride 0.65% Nasal 2 Spray(s) Both Nostrils four times a day PRN Nasal Congestion    Vital Signs Last 24 Hrs  T(C): 37.1 (14 Apr 2021 21:51), Max: 37.1 (14 Apr 2021 21:51)  T(F): 98.7 (14 Apr 2021 21:51), Max: 98.7 (14 Apr 2021 21:51)  HR: 74 (14 Apr 2021 21:51) (62 - 74)  BP: 150/72 (14 Apr 2021 21:51) (131/67 - 150/72)  BP(mean): --  RR: 18 (14 Apr 2021 21:51) (17 - 19)  SpO2: 100% (14 Apr 2021 21:51) (97% - 100%)    Constitutional: No fever, fatigue  Skin: No rash.  Eyes: No recent vision problems or eye pain.  ENT: No congestion, ear pain, or sore throat.  Cardiovascular: No chest pain or palpation.  Respiratory: No cough, shortness of breath, congestion, or wheezing.  Gastrointestinal: No abdominal pain, nausea, vomiting, or diarrhea.  Genitourinary: No dysuria.  Musculoskeletal: No joint swelling.  Neurologic: No headache.    PHYSICAL EXAM:  GENERAL: NAD  EYES: EOMI, PERRLA  NECK: Supple, No JVD  CHEST/LUNG: crackles at bases  HEART:  S1 , S2 +  ABDOMEN: soft , bs+  EXTREMITIES:  edema+  NEUROLOGY:alert awake     LABS:  04-14    137  |  94<L>  |  61<H>  ----------------------------<  96  4.8   |  36<H>  |  1.53<H>    Ca    10.3      14 Apr 2021 06:41  Phos  3.2     04-14  Mg     1.9     04-14      Creatinine Trend: 1.53 <--, 1.44 <--, 1.55 <--, 1.58 <--, 1.56 <--, 1.70 <--, 1.71 <--                        8.1    7.25  )-----------( 285      ( 14 Apr 2021 06:41 )             27.6     Urine Studies:              PT/INR - ( 14 Apr 2021 15:29 )   PT: 23.2 sec;   INR: 2.11 ratio         PTT - ( 14 Apr 2021 15:29 )  PTT:52.5 sec       PTT - ( 13 Apr 2021 08:23 )  PTT:73.1 sec  TT - ( 12 Apr 2021 07:39 )  PTT:70.6 sec  
    SUBJECTIVE / OVERNIGHT EVENTS: pt says she feels much better     MEDICATIONS  (STANDING):  albuterol/ipratropium for Nebulization 3 milliLiter(s) Nebulizer every 6 hours  allopurinol 100 milliGRAM(s) Oral daily  budesonide 160 MICROgram(s)/formoterol 4.5 MICROgram(s) Inhaler 2 Puff(s) Inhalation two times a day  heparin  Infusion. 1300 Unit(s)/Hr (13 mL/Hr) IV Continuous <Continuous>  pantoprazole    Tablet 40 milliGRAM(s) Oral before breakfast  simvastatin 10 milliGRAM(s) Oral at bedtime    MEDICATIONS  (PRN):  aluminum hydroxide/magnesium hydroxide/simethicone Suspension 30 milliLiter(s) Oral every 6 hours PRN Dyspepsia  heparin   Injectable 8500 Unit(s) IV Push every 6 hours PRN For aPTT less than 40  heparin   Injectable 4000 Unit(s) IV Push every 6 hours PRN For aPTT between 40 - 57  sodium chloride 0.65% Nasal 2 Spray(s) Both Nostrils four times a day PRN Nasal Congestion    Vital Signs Last 24 Hrs  T(C): 36.8 (2021 13:17), Max: 36.8 (2021 22:23)  T(F): 98.2 (2021 13:17), Max: 98.2 (2021 22:23)  HR: 71 (2021 15:38) (63 - 79)  BP: 121/62 (2021 13:17) (108/53 - 122/58)  BP(mean): --  RR: 16 (2021 13:17) (16 - 19)  SpO2: 98% (2021 13:17) (98% - 100%)    Constitutional: No fever, fatigue  Skin: No rash.  Eyes: No recent vision problems or eye pain.  ENT: No congestion, ear pain, or sore throat.  Cardiovascular: No chest pain or palpation.  Respiratory: No cough, shortness of breath, congestion, or wheezing.  Gastrointestinal: No abdominal pain, nausea, vomiting, or diarrhea.  Genitourinary: No dysuria.  Musculoskeletal: No joint swelling.  Neurologic: No headache.    PHYSICAL EXAM:  GENERAL: NAD  EYES: EOMI, PERRLA  NECK: Supple, No JVD  CHEST/LUNG: crackles at bases  HEART:  S1 , S2 +  ABDOMEN: soft , bs+  EXTREMITIES:  edema+  NEUROLOGY:alert awake    LABS:      134<L>  |  93<L>  |  79<H>  ----------------------------<  102<H>  4.6   |  33<H>  |  1.96<H>    Ca    9.2      2021 07:53  Mg     2.4           Creatinine Trend: 1.96 <--, 2.07 <--, 1.84 <--, 1.79 <--, 1.78 <--, 1.63 <--                        7.4    6.74  )-----------( 265      ( 2021 07:53 )             25.2     Urine Studies:  Urinalysis Basic - ( 2021 09:47 )    Color: Light Yellow / Appearance: Clear / S.012 / pH:   Gluc:  / Ketone: Negative  / Bili: Negative / Urobili: <2 mg/dL   Blood:  / Protein: Negative / Nitrite: Negative   Leuk Esterase: Negative / RBC:  / WBC    Sq Epi:  / Non Sq Epi:  / Bacteria:       Osmolality, Random Urine: 347 mosm/kg ( @ 13:33)  Sodium, Random Urine: 47 mmol/L ( @ 13:33)  Creatinine, Random Urine: 58 mg/dL ( @ 13:33)  Sodium, Random Urine: 72 mmol/L ( @ 09:47)  Potassium, Random Urine: 33.9 mmol/L ( @ 09:47)  Chloride, Random Urine: 53 mmol/L ( @ 09:47)  Creatinine, Random Urine: 56 mg/dL ( @ 09:47)            PT/INR - ( 2021 07:53 )   PT: 14.7 sec;   INR: 1.29 ratio         PTT - ( 2021 17:36 )  PTT:61.5 sec  
    SUBJECTIVE / OVERNIGHT EVENTS: pt says she feels much better     MEDICATIONS  (STANDING):  albuterol/ipratropium for Nebulization 3 milliLiter(s) Nebulizer every 6 hours  allopurinol 100 milliGRAM(s) Oral daily  budesonide 160 MICROgram(s)/formoterol 4.5 MICROgram(s) Inhaler 2 Puff(s) Inhalation two times a day  buMETAnide 2 milliGRAM(s) Oral daily  heparin  Infusion. 1300 Unit(s)/Hr (13 mL/Hr) IV Continuous <Continuous>  pantoprazole    Tablet 40 milliGRAM(s) Oral before breakfast  simvastatin 10 milliGRAM(s) Oral at bedtime    MEDICATIONS  (PRN):  aluminum hydroxide/magnesium hydroxide/simethicone Suspension 30 milliLiter(s) Oral every 6 hours PRN Dyspepsia  heparin   Injectable 8500 Unit(s) IV Push every 6 hours PRN For aPTT less than 40  heparin   Injectable 4000 Unit(s) IV Push every 6 hours PRN For aPTT between 40 - 57  sodium chloride 0.65% Nasal 2 Spray(s) Both Nostrils four times a day PRN Nasal Congestion    Vital Signs Last 24 Hrs  T(C): 36.8 (21 @ 14:39), Max: 36.8 (21 @ 09:43)  T(F): 98.3 (21 @ 14:39), Max: 98.3 (21 @ 09:43)  HR: 80 (21 @ 15:38) (64 - 81)  BP: 127/57 (21 @ 14:39) (112/41 - 156/80)  RR: 18 (21 @ 14:39) (18 - 18)  SpO2: 96% (21 @ 15:38) (96% - 100%)  Wt(kg): --    Constitutional: No fever, fatigue  Skin: No rash.  Eyes: No recent vision problems or eye pain.  ENT: No congestion, ear pain, or sore throat.  Cardiovascular: No chest pain or palpation.  Respiratory: No cough, shortness of breath, congestion, or wheezing.  Gastrointestinal: No abdominal pain, nausea, vomiting, or diarrhea.  Genitourinary: No dysuria.  Musculoskeletal: No joint swelling.  Neurologic: No headache.    PHYSICAL EXAM:  GENERAL: NAD  EYES: EOMI, PERRLA  NECK: Supple, No JVD  CHEST/LUNG: crackles at bases  HEART:  S1 , S2 +  ABDOMEN: soft , bs+  EXTREMITIES:  edema+  NEUROLOGY:alert awake     LABS:      134<L>  |  95<L>  |  73<H>  ----------------------------<  123<H>  5.0   |  32<H>  |  1.71<H>    Ca    9.4      2021 06:15  Phos  4.1       Mg     2.3           Creatinine Trend: 1.71 <--, 1.72 <--, 1.81 <--, 1.96 <--, 2.07 <--, 1.84 <--, 1.79 <--, 1.78 <--                        8.0    6.61  )-----------( 285      ( 2021 21:11 )             26.9     Urine Studies:  Urinalysis Basic - ( 2021 09:47 )    Color: Light Yellow / Appearance: Clear / S.012 / pH:   Gluc:  / Ketone: Negative  / Bili: Negative / Urobili: <2 mg/dL   Blood:  / Protein: Negative / Nitrite: Negative   Leuk Esterase: Negative / RBC:  / WBC    Sq Epi:  / Non Sq Epi:  / Bacteria:       Osmolality, Random Urine: 347 mosm/kg ( @ 13:33)  Sodium, Random Urine: 47 mmol/L ( @ 13:33)  Creatinine, Random Urine: 58 mg/dL ( @ 13:33)  Sodium, Random Urine: 72 mmol/L ( @ 09:47)  Potassium, Random Urine: 33.9 mmol/L ( @ 09:47)  Chloride, Random Urine: 53 mmol/L ( @ 09:47)  Creatinine, Random Urine: 56 mg/dL ( @ 09:47)            PT/INR - ( 2021 06:15 )   PT: 15.8 sec;   INR: 1.40 ratio         PTT - ( 2021 06:15 )  PTT:87.1 sec  
    SUBJECTIVE / OVERNIGHT EVENTS: pt says she feels much better     MEDICATIONS  (STANDING):  albuterol/ipratropium for Nebulization 3 milliLiter(s) Nebulizer every 12 hours  allopurinol 100 milliGRAM(s) Oral daily  budesonide 160 MICROgram(s)/formoterol 4.5 MICROgram(s) Inhaler 2 Puff(s) Inhalation two times a day  buMETAnide 2 milliGRAM(s) Oral daily  heparin  Infusion. 1000 Unit(s)/Hr (10 mL/Hr) IV Continuous <Continuous>  pantoprazole    Tablet 40 milliGRAM(s) Oral before breakfast  simvastatin 10 milliGRAM(s) Oral at bedtime    MEDICATIONS  (PRN):  aluminum hydroxide/magnesium hydroxide/simethicone Suspension 30 milliLiter(s) Oral every 6 hours PRN Dyspepsia  heparin   Injectable 8500 Unit(s) IV Push every 6 hours PRN For aPTT less than 40  heparin   Injectable 4000 Unit(s) IV Push every 6 hours PRN For aPTT between 40 - 57  sodium chloride 0.65% Nasal 2 Spray(s) Both Nostrils four times a day PRN Nasal Congestion    Vital Signs Last 24 Hrs  T(C): 37 (13 Apr 2021 21:04), Max: 37.2 (13 Apr 2021 15:07)  T(F): 98.6 (13 Apr 2021 21:04), Max: 98.9 (13 Apr 2021 15:07)  HR: 69 (13 Apr 2021 21:04) (66 - 71)  BP: 138/71 (13 Apr 2021 21:04) (109/52 - 138/71)  BP(mean): --  RR: 18 (13 Apr 2021 21:04) (17 - 19)  SpO2: 98% (13 Apr 2021 21:04) (98% - 100%)  Constitutional: No fever, fatigue  Skin: No rash.  Eyes: No recent vision problems or eye pain.  ENT: No congestion, ear pain, or sore throat.  Cardiovascular: No chest pain or palpation.  Respiratory: No cough, shortness of breath, congestion, or wheezing.  Gastrointestinal: No abdominal pain, nausea, vomiting, or diarrhea.  Genitourinary: No dysuria.  Musculoskeletal: No joint swelling.  Neurologic: No headache.    PHYSICAL EXAM:  GENERAL: NAD  EYES: EOMI, PERRLA  NECK: Supple, No JVD  CHEST/LUNG: crackles at bases  HEART:  S1 , S2 +  ABDOMEN: soft , bs+  EXTREMITIES:  edema+  NEUROLOGY:alert awake     LABS:  04-13    133<L>  |  92<L>  |  61<H>  ----------------------------<  99  5.1   |  37<H>  |  1.44<H>    Ca    10.3      13 Apr 2021 08:23  Phos  3.3     04-13  Mg     2.0     04-13      Creatinine Trend: 1.44 <--, 1.55 <--, 1.58 <--, 1.56 <--, 1.70 <--, 1.71 <--, 1.72 <--                        8.0    6.76  )-----------( 279      ( 13 Apr 2021 08:23 )             28.1     Urine Studies:              PT/INR - ( 13 Apr 2021 08:23 )   PT: 20.9 sec;   INR: 1.87 ratio         PTT - ( 13 Apr 2021 08:23 )  PTT:73.1 sec  TT - ( 12 Apr 2021 07:39 )  PTT:70.6 sec  
    SUBJECTIVE / OVERNIGHT EVENTS: pt says she feels much better     MEDICATIONS  (STANDING):  albuterol/ipratropium for Nebulization 3 milliLiter(s) Nebulizer every 6 hours  allopurinol 100 milliGRAM(s) Oral daily  budesonide 160 MICROgram(s)/formoterol 4.5 MICROgram(s) Inhaler 2 Puff(s) Inhalation two times a day  buMETAnide 2 milliGRAM(s) Oral daily  heparin  Infusion. 1000 Unit(s)/Hr (10 mL/Hr) IV Continuous <Continuous>  pantoprazole    Tablet 40 milliGRAM(s) Oral before breakfast  simvastatin 10 milliGRAM(s) Oral at bedtime    MEDICATIONS  (PRN):  aluminum hydroxide/magnesium hydroxide/simethicone Suspension 30 milliLiter(s) Oral every 6 hours PRN Dyspepsia  heparin   Injectable 8500 Unit(s) IV Push every 6 hours PRN For aPTT less than 40  heparin   Injectable 4000 Unit(s) IV Push every 6 hours PRN For aPTT between 40 - 57  sodium chloride 0.65% Nasal 2 Spray(s) Both Nostrils four times a day PRN Nasal Congestion    Vital Signs Last 24 Hrs  T(C): 36.4 (10 Apr 2021 17:49), Max: 36.6 (10 Apr 2021 03:45)  T(F): 97.6 (10 Apr 2021 17:49), Max: 97.8 (10 Apr 2021 03:45)  HR: 62 (10 Apr 2021 17:49) (57 - 96)  BP: 131/52 (10 Apr 2021 17:49) (131/52 - 141/71)  BP(mean): --  RR: 17 (10 Apr 2021 17:49) (16 - 18)  SpO2: 100% (10 Apr 2021 17:49) (94% - 100%)    Constitutional: No fever, fatigue  Skin: No rash.  Eyes: No recent vision problems or eye pain.  ENT: No congestion, ear pain, or sore throat.  Cardiovascular: No chest pain or palpation.  Respiratory: No cough, shortness of breath, congestion, or wheezing.  Gastrointestinal: No abdominal pain, nausea, vomiting, or diarrhea.  Genitourinary: No dysuria.  Musculoskeletal: No joint swelling.  Neurologic: No headache.    PHYSICAL EXAM:  GENERAL: NAD  EYES: EOMI, PERRLA  NECK: Supple, No JVD  CHEST/LUNG: crackles at bases  HEART:  S1 , S2 +  ABDOMEN: soft , bs+  EXTREMITIES:  edema+  NEUROLOGY:alert awake     LABS:  04-10    136  |  96<L>  |  66<H>  ----------------------------<  114<H>  5.1   |  31  |  1.56<H>    Ca    9.9      10 Apr 2021 07:13  Phos  3.6       Mg     2.2     04-10      Creatinine Trend: 1.56 <--, 1.70 <--, 1.71 <--, 1.72 <--, 1.81 <--, 1.96 <--, 2.07 <--                        7.6    6.62  )-----------( 280      ( 10 Apr 2021 07:13 )             25.9     Urine Studies:  Urinalysis Basic - ( 2021 09:47 )    Color: Light Yellow / Appearance: Clear / S.012 / pH:   Gluc:  / Ketone: Negative  / Bili: Negative / Urobili: <2 mg/dL   Blood:  / Protein: Negative / Nitrite: Negative   Leuk Esterase: Negative / RBC:  / WBC    Sq Epi:  / Non Sq Epi:  / Bacteria:       Osmolality, Random Urine: 347 mosm/kg ( @ 13:33)  Sodium, Random Urine: 47 mmol/L ( @ 13:33)  Creatinine, Random Urine: 58 mg/dL ( @ 13:33)  Sodium, Random Urine: 72 mmol/L ( @ 09:47)  Potassium, Random Urine: 33.9 mmol/L ( @ 09:47)  Chloride, Random Urine: 53 mmol/L ( @ 09:47)  Creatinine, Random Urine: 56 mg/dL ( @ 09:47)            PT/INR - ( 10 Apr 2021 07:13 )   PT: 17.3 sec;   INR: 1.54 ratio         PTT - ( 10 Apr 2021 18:38 )  PTT:77.7 sec    PT/INR - ( 2021 07:24 )   PT: 16.5 sec;   INR: 1.47 ratio         PTT - ( 2021 09:53 )  PTT:97.5 sec  
    SUBJECTIVE / OVERNIGHT EVENTS: pt says she feels much better     MEDICATIONS  (STANDING):  albuterol/ipratropium for Nebulization 3 milliLiter(s) Nebulizer every 6 hours  allopurinol 100 milliGRAM(s) Oral daily  budesonide 160 MICROgram(s)/formoterol 4.5 MICROgram(s) Inhaler 2 Puff(s) Inhalation two times a day  buMETAnide 2 milliGRAM(s) Oral daily  heparin  Infusion. 1300 Unit(s)/Hr (13 mL/Hr) IV Continuous <Continuous>  pantoprazole    Tablet 40 milliGRAM(s) Oral before breakfast  simvastatin 10 milliGRAM(s) Oral at bedtime  warfarin 7 milliGRAM(s) Oral once    MEDICATIONS  (PRN):  aluminum hydroxide/magnesium hydroxide/simethicone Suspension 30 milliLiter(s) Oral every 6 hours PRN Dyspepsia  heparin   Injectable 8500 Unit(s) IV Push every 6 hours PRN For aPTT less than 40  heparin   Injectable 4000 Unit(s) IV Push every 6 hours PRN For aPTT between 40 - 57  sodium chloride 0.65% Nasal 2 Spray(s) Both Nostrils four times a day PRN Nasal Congestion    Vital Signs Last 24 Hrs  T(C): 36.5 (2021 09:45), Max: 36.8 (2021 05:12)  T(F): 97.7 (2021 09:45), Max: 98.2 (2021 05:12)  HR: 65 (2021 15:21) (60 - 90)  BP: 123/46 (2021 09:45) (123/46 - 132/50)  BP(mean): --  RR: 19 (2021 09:45) (17 - 19)  SpO2: 96% (2021 15:21) (94% - 100%)    Constitutional: No fever, fatigue  Skin: No rash.  Eyes: No recent vision problems or eye pain.  ENT: No congestion, ear pain, or sore throat.  Cardiovascular: No chest pain or palpation.  Respiratory: No cough, shortness of breath, congestion, or wheezing.  Gastrointestinal: No abdominal pain, nausea, vomiting, or diarrhea.  Genitourinary: No dysuria.  Musculoskeletal: No joint swelling.  Neurologic: No headache.    PHYSICAL EXAM:  GENERAL: NAD  EYES: EOMI, PERRLA  NECK: Supple, No JVD  CHEST/LUNG: crackles at bases  HEART:  S1 , S2 +  ABDOMEN: soft , bs+  EXTREMITIES:  edema+  NEUROLOGY:alert awake    LABS:      136  |  96<L>  |  75<H>  ----------------------------<  94  5.0   |  33<H>  |  1.81<H>    Ca    9.4      2021 08:12  Phos  3.8       Mg     2.6           Creatinine Trend: 1.81 <--, 1.96 <--, 2.07 <--, 1.84 <--, 1.79 <--, 1.78 <--, 1.63 <--                        7.3    6.37  )-----------( 286      ( 2021 08:12 )             25.7     Urine Studies:  Urinalysis Basic - ( 2021 09:47 )    Color: Light Yellow / Appearance: Clear / S.012 / pH:   Gluc:  / Ketone: Negative  / Bili: Negative / Urobili: <2 mg/dL   Blood:  / Protein: Negative / Nitrite: Negative   Leuk Esterase: Negative / RBC:  / WBC    Sq Epi:  / Non Sq Epi:  / Bacteria:       Osmolality, Random Urine: 347 mosm/kg ( @ 13:33)  Sodium, Random Urine: 47 mmol/L ( @ 13:33)  Creatinine, Random Urine: 58 mg/dL ( @ 13:33)  Sodium, Random Urine: 72 mmol/L ( @ 09:47)  Potassium, Random Urine: 33.9 mmol/L ( @ 09:47)  Chloride, Random Urine: 53 mmol/L ( @ 09:47)  Creatinine, Random Urine: 56 mg/dL ( @ 09:47)            PT/INR - ( 2021 08:12 )   PT: 14.4 sec;   INR: 1.27 ratio         PTT - ( 2021 08:12 )  PTT:73.0 sec    PT/INR - ( 2021 07:53 )   PT: 14.7 sec;   INR: 1.29 ratio         PTT - ( 2021 17:36 )  PTT:61.5 sec

## 2021-04-16 NOTE — PROGRESS NOTE ADULT - REASON FOR ADMISSION
SOB

## 2021-06-18 ENCOUNTER — INPATIENT (INPATIENT)
Facility: HOSPITAL | Age: 71
LOS: 5 days | Discharge: HOME | End: 2021-06-24
Attending: INTERNAL MEDICINE | Admitting: INTERNAL MEDICINE
Payer: MEDICARE

## 2021-06-18 VITALS
WEIGHT: 240.08 LBS | OXYGEN SATURATION: 100 % | RESPIRATION RATE: 26 BRPM | DIASTOLIC BLOOD PRESSURE: 79 MMHG | HEIGHT: 64 IN | SYSTOLIC BLOOD PRESSURE: 143 MMHG | HEART RATE: 82 BPM | TEMPERATURE: 97 F

## 2021-06-18 DIAGNOSIS — Z95.4 PRESENCE OF OTHER HEART-VALVE REPLACEMENT: Chronic | ICD-10-CM

## 2021-06-18 DIAGNOSIS — D64.9 ANEMIA, UNSPECIFIED: ICD-10-CM

## 2021-06-18 DIAGNOSIS — I50.9 HEART FAILURE, UNSPECIFIED: ICD-10-CM

## 2021-06-18 DIAGNOSIS — J44.9 CHRONIC OBSTRUCTIVE PULMONARY DISEASE, UNSPECIFIED: ICD-10-CM

## 2021-06-18 LAB
ALBUMIN SERPL ELPH-MCNC: 3.4 G/DL — SIGNIFICANT CHANGE UP (ref 3.3–5)
ALLERGY+IMMUNOLOGY DIAG STUDY NOTE: SIGNIFICANT CHANGE UP
ALP SERPL-CCNC: 112 U/L — SIGNIFICANT CHANGE UP (ref 40–120)
ALT FLD-CCNC: 19 U/L — SIGNIFICANT CHANGE UP (ref 12–78)
ANION GAP SERPL CALC-SCNC: 3 MMOL/L — LOW (ref 5–17)
ANION GAP SERPL CALC-SCNC: 3 MMOL/L — LOW (ref 5–17)
ANISOCYTOSIS BLD QL: SLIGHT — SIGNIFICANT CHANGE UP
ANTIBODY INTERPRETATION 2: SIGNIFICANT CHANGE UP
APTT BLD: 38.5 SEC — HIGH (ref 27.5–35.5)
AST SERPL-CCNC: 23 U/L — SIGNIFICANT CHANGE UP (ref 15–37)
BASE EXCESS BLDA CALC-SCNC: 12 MMOL/L — HIGH (ref -2–2)
BASOPHILS # BLD AUTO: 0.03 K/UL — SIGNIFICANT CHANGE UP (ref 0–0.2)
BASOPHILS NFR BLD AUTO: 0.4 % — SIGNIFICANT CHANGE UP (ref 0–2)
BILIRUB SERPL-MCNC: 0.3 MG/DL — SIGNIFICANT CHANGE UP (ref 0.2–1.2)
BLD GP AB SCN SERPL QL: SIGNIFICANT CHANGE UP
BLOOD GAS COMMENTS: SIGNIFICANT CHANGE UP
BLOOD GAS COMMENTS: SIGNIFICANT CHANGE UP
BLOOD GAS SOURCE: SIGNIFICANT CHANGE UP
BUN SERPL-MCNC: 53 MG/DL — HIGH (ref 7–23)
BUN SERPL-MCNC: 54 MG/DL — HIGH (ref 7–23)
CALCIUM SERPL-MCNC: 9.2 MG/DL — SIGNIFICANT CHANGE UP (ref 8.5–10.1)
CALCIUM SERPL-MCNC: 9.5 MG/DL — SIGNIFICANT CHANGE UP (ref 8.5–10.1)
CHLORIDE SERPL-SCNC: 96 MMOL/L — SIGNIFICANT CHANGE UP (ref 96–108)
CHLORIDE SERPL-SCNC: 97 MMOL/L — SIGNIFICANT CHANGE UP (ref 96–108)
CO2 SERPL-SCNC: 38 MMOL/L — HIGH (ref 22–31)
CO2 SERPL-SCNC: 39 MMOL/L — HIGH (ref 22–31)
CREAT SERPL-MCNC: 1.53 MG/DL — HIGH (ref 0.5–1.3)
CREAT SERPL-MCNC: 1.54 MG/DL — HIGH (ref 0.5–1.3)
DAT IGG-SP REAG RBC-IMP: ABNORMAL
DIR ANTIGLOB POLYSPECIFIC INTERPRETATION: ABNORMAL
EOSINOPHIL # BLD AUTO: 0.09 K/UL — SIGNIFICANT CHANGE UP (ref 0–0.5)
EOSINOPHIL NFR BLD AUTO: 1.2 % — SIGNIFICANT CHANGE UP (ref 0–6)
FLUAV AG NPH QL: SIGNIFICANT CHANGE UP
FLUBV AG NPH QL: SIGNIFICANT CHANGE UP
GLUCOSE SERPL-MCNC: 112 MG/DL — HIGH (ref 70–99)
GLUCOSE SERPL-MCNC: 97 MG/DL — SIGNIFICANT CHANGE UP (ref 70–99)
HCO3 BLDA-SCNC: 38 MMOL/L — HIGH (ref 21–29)
HCT VFR BLD CALC: 24.7 % — LOW (ref 34.5–45)
HCT VFR BLD CALC: 25.7 % — LOW (ref 34.5–45)
HGB BLD-MCNC: 6.7 G/DL — CRITICAL LOW (ref 11.5–15.5)
HGB BLD-MCNC: 7 G/DL — CRITICAL LOW (ref 11.5–15.5)
HOROWITZ INDEX BLDA+IHG-RTO: 0.32 — SIGNIFICANT CHANGE UP
HYPOCHROMIA BLD QL: SLIGHT — SIGNIFICANT CHANGE UP
IAT COMP-SP REAG SERPL QL: SIGNIFICANT CHANGE UP
IMM GRANULOCYTES NFR BLD AUTO: 0.5 % — SIGNIFICANT CHANGE UP (ref 0–1.5)
INR BLD: 1.17 RATIO — HIGH (ref 0.88–1.16)
LYMPHOCYTES # BLD AUTO: 1.41 K/UL — SIGNIFICANT CHANGE UP (ref 1–3.3)
LYMPHOCYTES # BLD AUTO: 19.1 % — SIGNIFICANT CHANGE UP (ref 13–44)
MACROCYTES BLD QL: SLIGHT — SIGNIFICANT CHANGE UP
MAGNESIUM SERPL-MCNC: 2.3 MG/DL — SIGNIFICANT CHANGE UP (ref 1.6–2.6)
MANUAL SMEAR VERIFICATION: YES — SIGNIFICANT CHANGE UP
MCHC RBC-ENTMCNC: 23.2 PG — LOW (ref 27–34)
MCHC RBC-ENTMCNC: 23.2 PG — LOW (ref 27–34)
MCHC RBC-ENTMCNC: 27.1 GM/DL — LOW (ref 32–36)
MCHC RBC-ENTMCNC: 27.2 GM/DL — LOW (ref 32–36)
MCV RBC AUTO: 85.1 FL — SIGNIFICANT CHANGE UP (ref 80–100)
MCV RBC AUTO: 85.5 FL — SIGNIFICANT CHANGE UP (ref 80–100)
MICROCYTES BLD QL: SLIGHT — SIGNIFICANT CHANGE UP
MONOCYTES # BLD AUTO: 0.73 K/UL — SIGNIFICANT CHANGE UP (ref 0–0.9)
MONOCYTES NFR BLD AUTO: 9.9 % — SIGNIFICANT CHANGE UP (ref 2–14)
NEUTROPHILS # BLD AUTO: 5.1 K/UL — SIGNIFICANT CHANGE UP (ref 1.8–7.4)
NEUTROPHILS NFR BLD AUTO: 68.9 % — SIGNIFICANT CHANGE UP (ref 43–77)
NRBC # BLD: 0 /100 WBCS — SIGNIFICANT CHANGE UP (ref 0–0)
NRBC # BLD: 0 /100 WBCS — SIGNIFICANT CHANGE UP (ref 0–0)
NT-PROBNP SERPL-SCNC: 1510 PG/ML — HIGH (ref 0–125)
OB PNL STL: NEGATIVE — SIGNIFICANT CHANGE UP
OVALOCYTES BLD QL SMEAR: SLIGHT — SIGNIFICANT CHANGE UP
PCO2 BLDA: 66 MMHG — HIGH (ref 32–46)
PH BLD: 7.38 — SIGNIFICANT CHANGE UP (ref 7.35–7.45)
PLAT MORPH BLD: NORMAL — SIGNIFICANT CHANGE UP
PLATELET # BLD AUTO: 481 K/UL — HIGH (ref 150–400)
PLATELET # BLD AUTO: 504 K/UL — HIGH (ref 150–400)
PO2 BLDA: 87 MMHG — SIGNIFICANT CHANGE UP (ref 74–108)
POIKILOCYTOSIS BLD QL AUTO: SLIGHT — SIGNIFICANT CHANGE UP
POLYCHROMASIA BLD QL SMEAR: SLIGHT — SIGNIFICANT CHANGE UP
POTASSIUM SERPL-MCNC: 4.2 MMOL/L — SIGNIFICANT CHANGE UP (ref 3.5–5.3)
POTASSIUM SERPL-MCNC: 4.4 MMOL/L — SIGNIFICANT CHANGE UP (ref 3.5–5.3)
POTASSIUM SERPL-SCNC: 4.2 MMOL/L — SIGNIFICANT CHANGE UP (ref 3.5–5.3)
POTASSIUM SERPL-SCNC: 4.4 MMOL/L — SIGNIFICANT CHANGE UP (ref 3.5–5.3)
PROT SERPL-MCNC: 9 GM/DL — HIGH (ref 6–8.3)
PROTHROM AB SERPL-ACNC: 13.5 SEC — SIGNIFICANT CHANGE UP (ref 10.6–13.6)
RBC # BLD: 2.89 M/UL — LOW (ref 3.8–5.2)
RBC # BLD: 3.02 M/UL — LOW (ref 3.8–5.2)
RBC # FLD: 20.4 % — HIGH (ref 10.3–14.5)
RBC # FLD: 20.5 % — HIGH (ref 10.3–14.5)
RBC BLD AUTO: ABNORMAL
SAO2 % BLDA: 96 % — SIGNIFICANT CHANGE UP (ref 92–96)
SARS-COV-2 RNA SPEC QL NAA+PROBE: SIGNIFICANT CHANGE UP
SCHISTOCYTES BLD QL AUTO: SLIGHT — SIGNIFICANT CHANGE UP
SODIUM SERPL-SCNC: 138 MMOL/L — SIGNIFICANT CHANGE UP (ref 135–145)
SODIUM SERPL-SCNC: 138 MMOL/L — SIGNIFICANT CHANGE UP (ref 135–145)
TARGETS BLD QL SMEAR: SLIGHT — SIGNIFICANT CHANGE UP
TROPONIN I SERPL-MCNC: 0.02 NG/ML — SIGNIFICANT CHANGE UP (ref 0.01–0.04)
TSH SERPL-MCNC: 2.3 UIU/ML — SIGNIFICANT CHANGE UP (ref 0.36–3.74)
VIT B12 SERPL-MCNC: 998 PG/ML — SIGNIFICANT CHANGE UP (ref 232–1245)
WBC # BLD: 7.4 K/UL — SIGNIFICANT CHANGE UP (ref 3.8–10.5)
WBC # BLD: 8.21 K/UL — SIGNIFICANT CHANGE UP (ref 3.8–10.5)
WBC # FLD AUTO: 7.4 K/UL — SIGNIFICANT CHANGE UP (ref 3.8–10.5)
WBC # FLD AUTO: 8.21 K/UL — SIGNIFICANT CHANGE UP (ref 3.8–10.5)

## 2021-06-18 PROCEDURE — 93010 ELECTROCARDIOGRAM REPORT: CPT

## 2021-06-18 PROCEDURE — 86077 PHYS BLOOD BANK SERV XMATCH: CPT

## 2021-06-18 PROCEDURE — 85097 BONE MARROW INTERPRETATION: CPT

## 2021-06-18 PROCEDURE — 99285 EMERGENCY DEPT VISIT HI MDM: CPT

## 2021-06-18 PROCEDURE — 88341 IMHCHEM/IMCYTCHM EA ADD ANTB: CPT | Mod: 26

## 2021-06-18 PROCEDURE — 88365 INSITU HYBRIDIZATION (FISH): CPT | Mod: 26

## 2021-06-18 PROCEDURE — 88342 IMHCHEM/IMCYTCHM 1ST ANTB: CPT | Mod: 26

## 2021-06-18 PROCEDURE — 71045 X-RAY EXAM CHEST 1 VIEW: CPT | Mod: 26

## 2021-06-18 PROCEDURE — 88364 INSITU HYBRIDIZATION (FISH): CPT | Mod: 26

## 2021-06-18 PROCEDURE — 88313 SPECIAL STAINS GROUP 2: CPT | Mod: 26

## 2021-06-18 PROCEDURE — 88305 TISSUE EXAM BY PATHOLOGIST: CPT | Mod: 26

## 2021-06-18 RX ORDER — SIMVASTATIN 20 MG/1
10 TABLET, FILM COATED ORAL AT BEDTIME
Refills: 0 | Status: DISCONTINUED | OUTPATIENT
Start: 2021-06-18 | End: 2021-06-24

## 2021-06-18 RX ORDER — BUDESONIDE AND FORMOTEROL FUMARATE DIHYDRATE 160; 4.5 UG/1; UG/1
2 AEROSOL RESPIRATORY (INHALATION)
Refills: 0 | Status: DISCONTINUED | OUTPATIENT
Start: 2021-06-18 | End: 2021-06-24

## 2021-06-18 RX ORDER — MAGNESIUM HYDROXIDE 400 MG/1
30 TABLET, CHEWABLE ORAL DAILY
Refills: 0 | Status: DISCONTINUED | OUTPATIENT
Start: 2021-06-18 | End: 2021-06-24

## 2021-06-18 RX ORDER — SODIUM CHLORIDE 0.65 %
2 AEROSOL, SPRAY (ML) NASAL
Refills: 0 | Status: DISCONTINUED | OUTPATIENT
Start: 2021-06-18 | End: 2021-06-24

## 2021-06-18 RX ORDER — PANTOPRAZOLE SODIUM 20 MG/1
40 TABLET, DELAYED RELEASE ORAL ONCE
Refills: 0 | Status: COMPLETED | OUTPATIENT
Start: 2021-06-18 | End: 2021-06-18

## 2021-06-18 RX ORDER — PANTOPRAZOLE SODIUM 20 MG/1
40 TABLET, DELAYED RELEASE ORAL
Refills: 0 | Status: DISCONTINUED | OUTPATIENT
Start: 2021-06-18 | End: 2021-06-24

## 2021-06-18 RX ORDER — IPRATROPIUM/ALBUTEROL SULFATE 18-103MCG
3 AEROSOL WITH ADAPTER (GRAM) INHALATION EVERY 6 HOURS
Refills: 0 | Status: DISCONTINUED | OUTPATIENT
Start: 2021-06-18 | End: 2021-06-24

## 2021-06-18 RX ORDER — ALLOPURINOL 300 MG
100 TABLET ORAL DAILY
Refills: 0 | Status: DISCONTINUED | OUTPATIENT
Start: 2021-06-18 | End: 2021-06-24

## 2021-06-18 RX ORDER — ALBUTEROL 90 UG/1
1 AEROSOL, METERED ORAL EVERY 4 HOURS
Refills: 0 | Status: DISCONTINUED | OUTPATIENT
Start: 2021-06-18 | End: 2021-06-24

## 2021-06-18 RX ORDER — FUROSEMIDE 40 MG
40 TABLET ORAL DAILY
Refills: 0 | Status: COMPLETED | OUTPATIENT
Start: 2021-06-18 | End: 2021-06-21

## 2021-06-18 RX ORDER — FUROSEMIDE 40 MG
40 TABLET ORAL ONCE
Refills: 0 | Status: COMPLETED | OUTPATIENT
Start: 2021-06-18 | End: 2021-06-18

## 2021-06-18 RX ORDER — TIOTROPIUM BROMIDE 18 UG/1
1 CAPSULE ORAL; RESPIRATORY (INHALATION) DAILY
Refills: 0 | Status: DISCONTINUED | OUTPATIENT
Start: 2021-06-18 | End: 2021-06-24

## 2021-06-18 RX ORDER — SENNA PLUS 8.6 MG/1
2 TABLET ORAL AT BEDTIME
Refills: 0 | Status: DISCONTINUED | OUTPATIENT
Start: 2021-06-18 | End: 2021-06-24

## 2021-06-18 RX ADMIN — PANTOPRAZOLE SODIUM 40 MILLIGRAM(S): 20 TABLET, DELAYED RELEASE ORAL at 13:21

## 2021-06-18 RX ADMIN — Medication 40 MILLIGRAM(S): at 12:17

## 2021-06-18 RX ADMIN — Medication 2 SPRAY(S): at 21:22

## 2021-06-18 RX ADMIN — Medication 3 MILLILITER(S): at 17:00

## 2021-06-18 RX ADMIN — SIMVASTATIN 10 MILLIGRAM(S): 20 TABLET, FILM COATED ORAL at 21:22

## 2021-06-18 RX ADMIN — SENNA PLUS 2 TABLET(S): 8.6 TABLET ORAL at 21:21

## 2021-06-18 NOTE — ED PROVIDER NOTE - PROGRESS NOTE DETAILS
pt remains very comfortable speaking in clear full sentences smiling while sitting up in the stretcher. Dr. Friend is notified and admit pt

## 2021-06-18 NOTE — PHYSICAL THERAPY INITIAL EVALUATION ADULT - GAIT DISTANCE, PT EVAL
Able to ambulate 4 feet with RW and cont O2 @3l with CS. Further ambulation limited by weakness and patient reuested to sit down. O2 sats post ambulation 99%

## 2021-06-18 NOTE — PHYSICAL THERAPY INITIAL EVALUATION ADULT - ADDITIONAL COMMENTS
Patient lives with spouse/dtr/grandson cameron PH with 2 handrail to enter and no stairs inside. Pt has limited ambulation due to easy fatigue. Pt has mostly sedentary disposition. Pt walks 10-15 ft with SAC indoors as needed. Pt can walk outdoors with supervision 30-40 ft using Rollator and then rests. Uses B handrails to negotiate 4steps with CS . Pt is never left alone . She llanes snot have HHA. Wears pampers when going outdoors or on days when she has increased urinary frequency Pt has bedside commode available. Does not have wheelchair or hospital bed.

## 2021-06-18 NOTE — H&P ADULT - HISTORY OF PRESENT ILLNESS
· 70 years old female by ems c/o sob and chest discomfort when she is walking since 4 days ago. Pt uses 3 liter nc at home. Pt sts sob increases with walking and lying down. she has to use three pillows to sleep. Pt denies headache, dizziness, blurred visions, light sensitivities, focal/distal weakness or numbness, calfs pain, cough, nausea, vomiting, fever, chills, abd pain, dysuria, vaginal spotting or discharge, abnormal stool color or irregular bowel movements. Pt sts she stopped taking warfarin 3 weeks ago.  Patient recieved 40 mg of lasix iv, now feels better.   able to  speak in complete sentences.

## 2021-06-18 NOTE — PHYSICAL THERAPY INITIAL EVALUATION ADULT - ACTIVE RANGE OF MOTION EXAMINATION, REHAB EVAL
+b/l shoulder pain with overhead  ROM/bilateral lower extremity Active ROM was WNL (within normal limits)/bilateral upper extremity Active ROM was WFL (within functional limits)

## 2021-06-18 NOTE — PHYSICAL THERAPY INITIAL EVALUATION ADULT - AMBULATION SKILLS, REHAB EVAL
Uses O2 at all times  Able to ambulate 10-15ft with SAC in home environment . For outside home walking uses rollator to walk 20-30 fta nd then rests . Pt is driven  in car for appointments/needs device

## 2021-06-18 NOTE — PHYSICAL THERAPY INITIAL EVALUATION ADULT - PERTINENT HX OF CURRENT PROBLEM, REHAB EVAL
70 years old female by ems c/o sob and chest discomfort when she is walking since 4 days ago. Pt admitted for CHF

## 2021-06-18 NOTE — H&P ADULT - NSHPPHYSICALEXAM_GEN_ALL_CORE
General: A/ox 3, No Moderate Distress, with nasal cannulla oxygen  skin : Normal  Head. Tra. No lacerations  Neck: Supple, NO JVD  Cardiac: S1 S2, No M/R/G  Pulmonary: CTAP, Breathing unlabored, Bilateral rales  Abdomen: Soft, Non -tender, +BS x 4 quads  Neuro: A/o x 3, No focal deficits. Normal Motor and sensory exam  Vascular: Normal distal pulses.  Extremities: . No rashes. leg  pitting edema present  Decubiti ; None

## 2021-06-18 NOTE — H&P ADULT - ASSESSMENT
PUL edema   HFwPEF  copd   anemia   gout   Chronic Hypoxic resp  failure   CKD stage 3   Hx of MVR/TVR   hx of ablation for a. fib   Hx of GI bleed- coumadin discontinued 3 weeks ago by PMD

## 2021-06-18 NOTE — ED PROVIDER NOTE - CARDIAC HEART SOUNDS
S1-S2 Enbrel Counseling:  I discussed with the patient the risks of etanercept including but not limited to myelosuppression, immunosuppression, autoimmune hepatitis, demyelinating diseases, lymphoma, and infections.  The patient understands that monitoring is required including a PPD at baseline and must alert us or the primary physician if symptoms of infection or other concerning signs are noted.

## 2021-06-18 NOTE — ED ADULT NURSE NOTE - OBJECTIVE STATEMENT
A&Ox4, ambulating. c/o dizziness, sob, and chest discomfort since Monday, pt report symptoms started throughout the day with no particular trigger A&Ox4, ambulating. c/o dizziness, sob, and chest discomfort since Monday, pain pressure, constant, worse with laying down, 5/10, radiating to abdomen, pt reports stomach "feels empty and burning" pt report symptoms started throughout the day with no particular trigger. pt denies HA/fever/chills/falls/injuries. pt hx chf, copd

## 2021-06-18 NOTE — PHYSICAL THERAPY INITIAL EVALUATION ADULT - CRITERIA FOR SKILLED THERAPEUTIC INTERVENTIONS
No
impairments found/functional limitations in following categories/risk reduction/prevention/rehab potential/therapy frequency/predicted duration of therapy intervention/anticipated discharge recommendation

## 2021-06-18 NOTE — ED PROVIDER NOTE - OBJECTIVE STATEMENT
70 years old female by ems c/o sob and chest discomfort when she is walking since 4 days ago. Pt uses 3 liter nc at home. Pt sts sob increases with walking and lying down. she has to use three pillows to sleep. Pt denies headache, dizziness, blurred visions, light sensitivities, focal/distal weakness or numbness, calfs pain, cough, nausea, vomiting, fever, chills, abd pain, dysuria, vaginal spotting or discharge, abnormal stool color or irregular bowel movements. Pt sts she stopped taking warfarin 3 weeks ago.

## 2021-06-18 NOTE — PHYSICAL THERAPY INITIAL EVALUATION ADULT - GAIT TRAINING, PT EVAL
Pt will be able to ambulate using assistive device up to100 ft or more, be able to negotiate 4 steps safely observing proper gait, posture and prevent falls.

## 2021-06-18 NOTE — H&P ADULT - NSHPREVIEWOFSYSTEMS_GEN_ALL_CORE
REVIEW OF SYSTEMS:    CONSTITUTIONAL: No weakness, fevers or chills. on hme o2 3l/Nc  EYES/ENT: No visual changes;  No vertigo or throat pain   NECK: No pain or stiffness  RESPIRATORY: No cough, wheezing, hemoptysis; No shortness of breath  CARDIOVASCULAR: No chest pain or palpitations [ ] CHF [ ] MI [ ] CABG [ ]  GASTROINTESTINAL: No abdominal or epigastric pain. No nausea, vomiting, or hematemesis; No diarrhea or constipation. No melena or hematochezia. [ ]abdominal surgery [ ] prostrate problems   GENITOURINARY: No dysuria, frequency or hematuria   NEUROLOGICAL: No numbness or weakness. No hx of seizures  SKIN: No itching, burning, rashes, or lesions   All other review of systems is negative unless indicated above.

## 2021-06-18 NOTE — H&P ADULT - NSHPLABSRESULTS_GEN_ALL_CORE
6.7                  138  | 38   | 54           7.40  >-----------< 504     ------------------------< 112                   24.7                 4.2  | 97   | 1.53                                         Ca 9.2   Mg 2.3   Ph x      PT/INR - ( 18 Jun 2021 12:20 )   PT: 13.5 sec;   INR: 1.17 ratio         PTT - ( 18 Jun 2021 12:20 )  PTT:38.5 sec  CARDIAC MARKERS ( 18 Jun 2021 12:20 )  .018 ng/mL / x     / x     / x     / x        ProBNP elevated   EKG sinus rhtyhm NAC RBB   chest xray- PUL edema. s/p sternotomy.  Valve replacement

## 2021-06-18 NOTE — ED ADULT TRIAGE NOTE - PATIENT ON (OXYGEN DELIVERY METHOD)
room air Former smoker Former very mild smoker, no drug/EtOH use currently. Lives at home w/ his wife.

## 2021-06-19 LAB
COVID-19 SPIKE DOMAIN AB INTERP: POSITIVE
COVID-19 SPIKE DOMAIN ANTIBODY RESULT: >250 U/ML — HIGH
SARS-COV-2 IGG+IGM SERPL QL IA: >250 U/ML — HIGH
SARS-COV-2 IGG+IGM SERPL QL IA: POSITIVE

## 2021-06-19 RX ORDER — FUROSEMIDE 40 MG
40 TABLET ORAL ONCE
Refills: 0 | Status: COMPLETED | OUTPATIENT
Start: 2021-06-19 | End: 2021-06-19

## 2021-06-19 RX ADMIN — Medication 2 SPRAY(S): at 17:48

## 2021-06-19 RX ADMIN — Medication 2 SPRAY(S): at 21:22

## 2021-06-19 RX ADMIN — Medication 100 MILLIGRAM(S): at 11:23

## 2021-06-19 RX ADMIN — PANTOPRAZOLE SODIUM 40 MILLIGRAM(S): 20 TABLET, DELAYED RELEASE ORAL at 05:47

## 2021-06-19 RX ADMIN — Medication 3 MILLILITER(S): at 17:33

## 2021-06-19 RX ADMIN — SENNA PLUS 2 TABLET(S): 8.6 TABLET ORAL at 21:21

## 2021-06-19 RX ADMIN — Medication 3 MILLILITER(S): at 00:28

## 2021-06-19 RX ADMIN — Medication 3 MILLILITER(S): at 11:14

## 2021-06-19 RX ADMIN — Medication 2 SPRAY(S): at 05:47

## 2021-06-19 RX ADMIN — Medication 2 SPRAY(S): at 11:20

## 2021-06-19 RX ADMIN — Medication 40 MILLIGRAM(S): at 11:54

## 2021-06-19 RX ADMIN — Medication 40 MILLIGRAM(S): at 05:47

## 2021-06-19 RX ADMIN — SIMVASTATIN 10 MILLIGRAM(S): 20 TABLET, FILM COATED ORAL at 21:21

## 2021-06-20 DIAGNOSIS — N28.89 OTHER SPECIFIED DISORDERS OF KIDNEY AND URETER: ICD-10-CM

## 2021-06-20 LAB
ANION GAP SERPL CALC-SCNC: 3 MMOL/L — LOW (ref 5–17)
APPEARANCE UR: CLEAR — SIGNIFICANT CHANGE UP
BACTERIA # UR AUTO: ABNORMAL
BILIRUB UR-MCNC: NEGATIVE — SIGNIFICANT CHANGE UP
BUN SERPL-MCNC: 53 MG/DL — HIGH (ref 7–23)
CALCIUM SERPL-MCNC: 9.5 MG/DL — SIGNIFICANT CHANGE UP (ref 8.5–10.1)
CHLORIDE SERPL-SCNC: 97 MMOL/L — SIGNIFICANT CHANGE UP (ref 96–108)
CO2 SERPL-SCNC: 38 MMOL/L — HIGH (ref 22–31)
COLOR SPEC: YELLOW — SIGNIFICANT CHANGE UP
CREAT SERPL-MCNC: 1.62 MG/DL — HIGH (ref 0.5–1.3)
DIFF PNL FLD: NEGATIVE — SIGNIFICANT CHANGE UP
EPI CELLS # UR: SIGNIFICANT CHANGE UP
GLUCOSE SERPL-MCNC: 128 MG/DL — HIGH (ref 70–99)
GLUCOSE UR QL: NEGATIVE MG/DL — SIGNIFICANT CHANGE UP
HCT VFR BLD CALC: 26.4 % — LOW (ref 34.5–45)
HGB BLD-MCNC: 7.3 G/DL — LOW (ref 11.5–15.5)
INR BLD: 1.19 RATIO — HIGH (ref 0.88–1.16)
KETONES UR-MCNC: NEGATIVE — SIGNIFICANT CHANGE UP
LEUKOCYTE ESTERASE UR-ACNC: NEGATIVE — SIGNIFICANT CHANGE UP
MCHC RBC-ENTMCNC: 23.7 PG — LOW (ref 27–34)
MCHC RBC-ENTMCNC: 27.7 GM/DL — LOW (ref 32–36)
MCV RBC AUTO: 85.7 FL — SIGNIFICANT CHANGE UP (ref 80–100)
NITRITE UR-MCNC: NEGATIVE — SIGNIFICANT CHANGE UP
NRBC # BLD: 0 /100 WBCS — SIGNIFICANT CHANGE UP (ref 0–0)
OB PNL STL: NEGATIVE — SIGNIFICANT CHANGE UP
PH UR: 5 — SIGNIFICANT CHANGE UP (ref 5–8)
PLATELET # BLD AUTO: 476 K/UL — HIGH (ref 150–400)
POTASSIUM SERPL-MCNC: 4.2 MMOL/L — SIGNIFICANT CHANGE UP (ref 3.5–5.3)
POTASSIUM SERPL-SCNC: 4.2 MMOL/L — SIGNIFICANT CHANGE UP (ref 3.5–5.3)
PROT UR-MCNC: NEGATIVE MG/DL — SIGNIFICANT CHANGE UP
PROTHROM AB SERPL-ACNC: 13.7 SEC — HIGH (ref 10.6–13.6)
RBC # BLD: 3.08 M/UL — LOW (ref 3.8–5.2)
RBC # FLD: 19.7 % — HIGH (ref 10.3–14.5)
RBC CASTS # UR COMP ASSIST: SIGNIFICANT CHANGE UP /HPF (ref 0–4)
SODIUM SERPL-SCNC: 138 MMOL/L — SIGNIFICANT CHANGE UP (ref 135–145)
SP GR SPEC: 1.01 — SIGNIFICANT CHANGE UP (ref 1.01–1.02)
UROBILINOGEN FLD QL: NEGATIVE MG/DL — SIGNIFICANT CHANGE UP
WBC # BLD: 8.62 K/UL — SIGNIFICANT CHANGE UP (ref 3.8–10.5)
WBC # FLD AUTO: 8.62 K/UL — SIGNIFICANT CHANGE UP (ref 3.8–10.5)
WBC UR QL: SIGNIFICANT CHANGE UP

## 2021-06-20 RX ADMIN — Medication 2 SPRAY(S): at 21:45

## 2021-06-20 RX ADMIN — Medication 100 MILLIGRAM(S): at 11:11

## 2021-06-20 RX ADMIN — SIMVASTATIN 10 MILLIGRAM(S): 20 TABLET, FILM COATED ORAL at 21:43

## 2021-06-20 RX ADMIN — Medication 40 MILLIGRAM(S): at 05:31

## 2021-06-20 RX ADMIN — Medication 3 MILLILITER(S): at 17:35

## 2021-06-20 RX ADMIN — SENNA PLUS 2 TABLET(S): 8.6 TABLET ORAL at 21:43

## 2021-06-20 RX ADMIN — Medication 2 SPRAY(S): at 17:10

## 2021-06-20 RX ADMIN — Medication 3 MILLILITER(S): at 00:10

## 2021-06-20 RX ADMIN — Medication 3 MILLILITER(S): at 11:13

## 2021-06-20 RX ADMIN — Medication 2 SPRAY(S): at 11:12

## 2021-06-20 RX ADMIN — Medication 30 MILLILITER(S): at 21:43

## 2021-06-20 RX ADMIN — Medication 3 MILLILITER(S): at 05:23

## 2021-06-20 RX ADMIN — Medication 2 SPRAY(S): at 05:32

## 2021-06-20 RX ADMIN — PANTOPRAZOLE SODIUM 40 MILLIGRAM(S): 20 TABLET, DELAYED RELEASE ORAL at 05:31

## 2021-06-20 NOTE — DIETITIAN INITIAL EVALUATION ADULT. - ENTER FROM (G/KG)
I will STOP taking the medications listed below when I get home from the hospital:    oxyCODONE 5 mg oral tablet  -- 1 tab(s) by mouth every 4 hours, As needed, Mild to Moderate Pain    oxyCODONE 10 mg oral tablet  -- 1 tab(s) by mouth every 4 hours, As needed, Severe Pain (7 - 10)    Coumadin 7.5 mg oral tablet  -- 1 tab(s) by mouth once ( takie tonight)   -- Do not take this drug if you are pregnant.  It is very important that you take or use this exactly as directed.  Do not skip doses or discontinue unless directed by your doctor.  Obtain medical advice before taking any non-prescription drugs as some may affect the action of this medication. 1 I will STOP taking the medications listed below when I get home from the hospital:    oxyCODONE 5 mg oral tablet  -- 1 tab(s) by mouth every 4 hours, As needed, Mild to Moderate Pain    oxyCODONE 10 mg oral tablet  -- 1 tab(s) by mouth every 4 hours, As needed, Severe Pain (7 - 10)    Coumadin 5 mg oral tablet  -- 1 tab(s) by mouth once a day resume 9/14/18    Coumadin 7.5 mg oral tablet  -- 1 tab(s) by mouth once ( takie tonight)   -- Do not take this drug if you are pregnant.  It is very important that you take or use this exactly as directed.  Do not skip doses or discontinue unless directed by your doctor.  Obtain medical advice before taking any non-prescription drugs as some may affect the action of this medication.

## 2021-06-20 NOTE — DIETITIAN INITIAL EVALUATION ADULT. - PERTINENT MEDS FT
06-20 Na138 mmol/L Glu 128 mg/dL<H> K+ 4.2 mmol/L Cr  1.62 mg/dL<H> BUN 53 mg/dL<H> 06-18 Alb 3.4 g/dL06-18 ALT 19 U/L AST 23 U/L Alkaline Phosphatase 112 U/L

## 2021-06-20 NOTE — CONSULT NOTE ADULT - PROBLEM SELECTOR RECOMMENDATION 9
- patient states was aware of renal mass, was going to see outpatient f/u for it  - was seen by Urology in April, can consider urology evlauation  - Anemia work-up   - physical therapy - patient states was aware of renal mass, was going to see outpatient f/u for it  - was seen by Urology in April, can consider urology evlauation  - Anemia work-up   - physical therapy  - elevated total proteins, myeloma w/u

## 2021-06-20 NOTE — DIETITIAN INITIAL EVALUATION ADULT. - OTHER CALCULATIONS
IBW 54.4 kg used for calculation of estimated needs. 06/18, 109 kg, %IBW:  200%          % UBW: 100%, wt. gain of 3.5 kg noted since adm

## 2021-06-20 NOTE — DIETITIAN INITIAL EVALUATION ADULT. - OTHER INFO
Pt is known to RD from previous adm for heart failure dx, hx of Iron deficiency noted.  Nutrition education for heart failure nutrition therapy reviewed, compliance encouraged, encouraged adequate intake of high Iron foods.  Daily weight monitoring guidelines reviewed.

## 2021-06-20 NOTE — DIETITIAN INITIAL EVALUATION ADULT. - ORAL INTAKE PTA/DIET HISTORY
Pt's  did the shopping, /daughter did the cooking.  As per pt., she has been following low sodium diet and limiting fluid intake.

## 2021-06-21 ENCOUNTER — RESULT REVIEW (OUTPATIENT)
Age: 71
End: 2021-06-21

## 2021-06-21 LAB
ALBUMIN SERPL ELPH-MCNC: 3.2 G/DL — LOW (ref 3.3–5)
ALP SERPL-CCNC: 92 U/L — SIGNIFICANT CHANGE UP (ref 40–120)
ALT FLD-CCNC: 16 U/L — SIGNIFICANT CHANGE UP (ref 12–78)
ANION GAP SERPL CALC-SCNC: 4 MMOL/L — LOW (ref 5–17)
AST SERPL-CCNC: 22 U/L — SIGNIFICANT CHANGE UP (ref 15–37)
BASOPHILS # BLD AUTO: 0.04 K/UL — SIGNIFICANT CHANGE UP (ref 0–0.2)
BASOPHILS NFR BLD AUTO: 0.5 % — SIGNIFICANT CHANGE UP (ref 0–2)
BILIRUB DIRECT SERPL-MCNC: 0.17 MG/DL — SIGNIFICANT CHANGE UP (ref 0.05–0.2)
BILIRUB INDIRECT FLD-MCNC: 0.2 MG/DL — SIGNIFICANT CHANGE UP (ref 0.2–1)
BILIRUB SERPL-MCNC: 0.4 MG/DL — SIGNIFICANT CHANGE UP (ref 0.2–1.2)
BUN SERPL-MCNC: 55 MG/DL — HIGH (ref 7–23)
CALCIUM SERPL-MCNC: 9.6 MG/DL — SIGNIFICANT CHANGE UP (ref 8.5–10.1)
CHLORIDE SERPL-SCNC: 96 MMOL/L — SIGNIFICANT CHANGE UP (ref 96–108)
CO2 SERPL-SCNC: 37 MMOL/L — HIGH (ref 22–31)
COMMENT - BLOOD BANK: SIGNIFICANT CHANGE UP
CREAT SERPL-MCNC: 1.58 MG/DL — HIGH (ref 0.5–1.3)
EOSINOPHIL # BLD AUTO: 0.3 K/UL — SIGNIFICANT CHANGE UP (ref 0–0.5)
EOSINOPHIL NFR BLD AUTO: 4.1 % — SIGNIFICANT CHANGE UP (ref 0–6)
FOLATE SERPL-MCNC: 14 NG/ML — SIGNIFICANT CHANGE UP
GLUCOSE SERPL-MCNC: 93 MG/DL — SIGNIFICANT CHANGE UP (ref 70–99)
HCT VFR BLD CALC: 27 % — LOW (ref 34.5–45)
HGB BLD-MCNC: 7.4 G/DL — LOW (ref 11.5–15.5)
IMM GRANULOCYTES NFR BLD AUTO: 0.8 % — SIGNIFICANT CHANGE UP (ref 0–1.5)
LYMPHOCYTES # BLD AUTO: 1.58 K/UL — SIGNIFICANT CHANGE UP (ref 1–3.3)
LYMPHOCYTES # BLD AUTO: 21.6 % — SIGNIFICANT CHANGE UP (ref 13–44)
MCHC RBC-ENTMCNC: 24 PG — LOW (ref 27–34)
MCHC RBC-ENTMCNC: 27.4 GM/DL — LOW (ref 32–36)
MCV RBC AUTO: 87.7 FL — SIGNIFICANT CHANGE UP (ref 80–100)
MONOCYTES # BLD AUTO: 0.67 K/UL — SIGNIFICANT CHANGE UP (ref 0–0.9)
MONOCYTES NFR BLD AUTO: 9.2 % — SIGNIFICANT CHANGE UP (ref 2–14)
NEUTROPHILS # BLD AUTO: 4.66 K/UL — SIGNIFICANT CHANGE UP (ref 1.8–7.4)
NEUTROPHILS NFR BLD AUTO: 63.8 % — SIGNIFICANT CHANGE UP (ref 43–77)
NRBC # BLD: 0 /100 WBCS — SIGNIFICANT CHANGE UP (ref 0–0)
PLATELET # BLD AUTO: 490 K/UL — HIGH (ref 150–400)
POTASSIUM SERPL-MCNC: 4.4 MMOL/L — SIGNIFICANT CHANGE UP (ref 3.5–5.3)
POTASSIUM SERPL-SCNC: 4.4 MMOL/L — SIGNIFICANT CHANGE UP (ref 3.5–5.3)
PROT SERPL-MCNC: 7.8 G/DL — SIGNIFICANT CHANGE UP (ref 6–8.3)
PROT SERPL-MCNC: 7.8 G/DL — SIGNIFICANT CHANGE UP (ref 6–8.3)
PROT SERPL-MCNC: 8.6 GM/DL — HIGH (ref 6–8.3)
RBC # BLD: 3.08 M/UL — LOW (ref 3.8–5.2)
RBC # BLD: 3.08 M/UL — LOW (ref 3.8–5.2)
RBC # FLD: 19.9 % — HIGH (ref 10.3–14.5)
RETICS #: 93.9 K/UL — SIGNIFICANT CHANGE UP (ref 25–125)
RETICS/RBC NFR: 3.1 % — HIGH (ref 0.5–2.5)
SODIUM SERPL-SCNC: 137 MMOL/L — SIGNIFICANT CHANGE UP (ref 135–145)
VIT B12 SERPL-MCNC: 903 PG/ML — SIGNIFICANT CHANGE UP (ref 232–1245)
WBC # BLD: 7.31 K/UL — SIGNIFICANT CHANGE UP (ref 3.8–10.5)
WBC # FLD AUTO: 7.31 K/UL — SIGNIFICANT CHANGE UP (ref 3.8–10.5)

## 2021-06-21 PROCEDURE — 99221 1ST HOSP IP/OBS SF/LOW 40: CPT | Mod: 26,59

## 2021-06-21 PROCEDURE — 88365 INSITU HYBRIDIZATION (FISH): CPT | Mod: 26

## 2021-06-21 PROCEDURE — 88364 INSITU HYBRIDIZATION (FISH): CPT | Mod: 26

## 2021-06-21 PROCEDURE — 88313 SPECIAL STAINS GROUP 2: CPT | Mod: 26

## 2021-06-21 PROCEDURE — 88342 IMHCHEM/IMCYTCHM 1ST ANTB: CPT | Mod: 26

## 2021-06-21 PROCEDURE — 88341 IMHCHEM/IMCYTCHM EA ADD ANTB: CPT | Mod: 26

## 2021-06-21 PROCEDURE — 88305 TISSUE EXAM BY PATHOLOGIST: CPT | Mod: 26

## 2021-06-21 PROCEDURE — 77012 CT SCAN FOR NEEDLE BIOPSY: CPT | Mod: 26

## 2021-06-21 PROCEDURE — 85097 BONE MARROW INTERPRETATION: CPT

## 2021-06-21 PROCEDURE — 20225 BONE BIOPSY TROCAR/NDL DEEP: CPT

## 2021-06-21 RX ADMIN — Medication 100 MILLIGRAM(S): at 13:41

## 2021-06-21 RX ADMIN — Medication 2 SPRAY(S): at 17:06

## 2021-06-21 RX ADMIN — SIMVASTATIN 10 MILLIGRAM(S): 20 TABLET, FILM COATED ORAL at 22:05

## 2021-06-21 RX ADMIN — PANTOPRAZOLE SODIUM 40 MILLIGRAM(S): 20 TABLET, DELAYED RELEASE ORAL at 05:17

## 2021-06-21 RX ADMIN — Medication 3 MILLILITER(S): at 17:45

## 2021-06-21 RX ADMIN — Medication 40 MILLIGRAM(S): at 05:17

## 2021-06-21 RX ADMIN — Medication 3 MILLILITER(S): at 00:07

## 2021-06-21 RX ADMIN — Medication 2 SPRAY(S): at 13:41

## 2021-06-21 RX ADMIN — Medication 2 SPRAY(S): at 05:17

## 2021-06-21 RX ADMIN — SENNA PLUS 2 TABLET(S): 8.6 TABLET ORAL at 22:05

## 2021-06-21 NOTE — PROGRESS NOTE ADULT - PROBLEM SELECTOR PLAN 1
- Anemia work-up underway  - elevated Proteins - myeloma w/u underway  - d/w patient about BM biopsy - patient wants to think about it  - renal mass,  patient aware of it - seen Urology in April for it  - Pulmonary f/u - Anemia work-up underway  - elevated Proteins - myeloma w/u underway  - d/w patient about BM biopsy - patient wants to think about it  - renal mass,  patient aware of it - seen Urology in April for it  - Pulmonary f/u    Addendum: patient agreeing for BM biopsy, will arrange with IR to do it

## 2021-06-21 NOTE — PROCEDURE NOTE - PROCEDURE FINDINGS AND DETAILS
s/p CT bone marrow biopsy.  10mL of marrow and small piece of bone obtained and sent to lab for analysis..  Hemostasis achieved.  DSD applied.  Post procedure CT shows no hematoma.  Pt tolerated procedure well.  VSS

## 2021-06-21 NOTE — CONSULT NOTE ADULT - ASSESSMENT
Renal Mass
IR consulted for bone marrow biopsy.  Pt with chronic symptomatic anemia, now s/p PRBC transfusion.  Also found to have elevated serum proteins,   MISAEL, No LAD, no osseous bone lesions, no febrile or leukocytosis  will do MM w/u per oncology   Pt denies any GI bleed (one episode of FOBT, rest negative), multiple myeloma leukemia or lymphomas.    pt on home warfarin and c/o VILLA and epistaxis    Found to have renal mass, per onc note, pt aware    Antiocoagulants held for the anemia      70y old obese Female who presents with a chief complaint of dyspnea on exertion/ Pul edema/ COPD/ HFwPEF/ MVR/TVR/ anemia / FOB positive (21 Jun 2021 10:19)    Plan  -will perform today  -meds, labs and vitals reviewed  -Consent obtained from patient

## 2021-06-21 NOTE — CONSULT NOTE ADULT - SUBJECTIVE AND OBJECTIVE BOX
Patient is a 70y old  Female who presents with a chief complaint of dyspnea on exertion/ Pul edema/ COPD/ HFwPEF/ MVR/TVR/ anemia / FOB positive (18 Jun 2021 13:43)    HPI: 70 year female with HTN, A fib, Mitral & Tricuspid valve replacement, pHTN, Diastolic CHF, Atrial Fibrillation S/P Ablation, COPD(never smoked but reports 2nd hand exposure from ), Chronic hypoxic hypercarbic Respiratory failure  on 3 litre home O2, Obesity, MIGUEL(did not tolerate CPAP), Gout, Anemia, Renal Insuffiencey  Brought by EMS c/o sob and chest discomfort, more  when she is walking, worse for  4 days ago. Reports PND, uses 3 liter nc at home.   Denies headache, dizziness, blurred visions, light sensitivities, focal/distal weakness or numbness, calfs pain, cough, nausea, vomiting, fever, chills, abd pain,  Stopped taking warfarin 3 weeks ago, was told from her doctor office that she does not need it.  Patient received 40 mg of Lasix iv in ED    PAST MEDICAL & SURGICAL HISTORY:  Atrial fibrillation  S/P Ablation    Pulmonary hypertension    MIGUEL (obstructive sleep apnea)    COPD (chronic obstructive pulmonary disease)  on home O2    CHF (congestive heart failure)    HTN (hypertension)    Gout    Mitral valve replaced    Tricuspid valve replaced    CHF (congestive heart failure)    Hypertension    COPD (chronic obstructive pulmonary disease)    History of mitral valve replacement with mechanical valve    Tricuspid valve replaced  mechanical    No significant past surgical history    FAMILY HISTORY:  Family history of hypertension (Father, Sibling)    Family history of stroke    Family history of hypertension (Mother)    SOCIAL HISTORY: BMI (kg/m2): 41.2 (06-18 @ 16:37). 2nd hand exposure.    Allergies  No Known Allergies    MEDICATIONS  (STANDING):  ALBUTerol    90 MICROgram(s) HFA Inhaler 1 Puff(s) Inhalation every 4 hours  albuterol/ipratropium for Nebulization 3 milliLiter(s) Nebulizer every 6 hours  allopurinol 100 milliGRAM(s) Oral daily  budesonide 160 MICROgram(s)/formoterol 4.5 MICROgram(s) Inhaler 2 Puff(s) Inhalation two times a day  furosemide   Injectable 40 milliGRAM(s) IV Push daily  pantoprazole    Tablet 40 milliGRAM(s) Oral before breakfast  senna 2 Tablet(s) Oral at bedtime  simvastatin 10 milliGRAM(s) Oral at bedtime  sodium chloride 0.65% Nasal 2 Spray(s) Both Nostrils four times a day  tiotropium 18 MICROgram(s) Capsule 1 Capsule(s) Inhalation daily    MEDICATIONS  (PRN):  magnesium hydroxide Suspension 30 milliLiter(s) Oral daily PRN Constipation    REVIEW OF SYSTEMS:    Constitutional:            No fever, weight loss or fatigue  HEENT:                      No difficulty hearing, tinnitus, vertigo; No sinus or throat pain  Respiratory:               SOB  Cardiovascular:           No chest pain, palpitations  Gastrointestinal:        No abdominal or epigastric pain. No N/V/diarrhea or hematemesis  Genitourinary:            No dysuria, frequency, hematuria or incontinence  SKIN:                             no rash  Musculoskeletal:        No joint pain or swelling  Extremities:                swelling  Neurological:              No headaches  Psychiatric:                 No depression, anxiety    MACRA & MIPS:  Vaccines - Influenza: no and Pneumovax: no  Tobacco: no  Blood Pressure Screening / Control of: 148/78  Current Medications Reviewed: yes    Vital Signs Last 24 Hrs  T(C): 36.3 (18 Jun 2021 16:37), Max: 37.2 (18 Jun 2021 13:27)  T(F): 97.3 (18 Jun 2021 16:37), Max: 98.9 (18 Jun 2021 13:27)  HR: 63 (18 Jun 2021 18:35) (57 - 82)  BP: 144/74 (18 Jun 2021 18:35) (130/64 - 148/78)  BP(mean): --  RR: 20 (18 Jun 2021 16:37) (17 - 26)  SpO2: 99% (18 Jun 2021 18:35) (98% - 100%)    PHYSICAL EXAM:  GEN:         Awake, responsive and comfortable.  HEENT:    Normal.    RESP:       decreased air entry  CVS:          Regular rate and rhythm.   ABD:         Soft, non-tender, non-distended;   SKIN:           Warm and dry.  EXTR:            No clubbing, cyanosis or edema  CNS:              Intact sensory and motor function.  PSYCH:        cooperative, no anxiety or depression    LABS:                        7.0    8.21  )-----------( 481      ( 18 Jun 2021 15:55 )             25.7     06-18    138  |  96  |  53<H>  ----------------------------<  97  4.4   |  39<H>  |  1.54<H>    Ca    9.5      18 Jun 2021 15:55  Mg     2.3     06-18    TPro  9.0<H>  /  Alb  3.4  /  TBili  0.3  /  DBili  x   /  AST  23  /  ALT  19  /  AlkPhos  112  06-18    PT/INR - ( 18 Jun 2021 12:20 )   PT: 13.5 sec;   INR: 1.17 ratio      PTT - ( 18 Jun 2021 12:20 )  PTT:38.5 sec  06-18 @ 13:24  pH: 7.38  pCO2: 66  pO2: 87  SaO2: 96    EKG: Sinus, RBBB    RADIOLOGY & ADDITIONAL STUDIES:  < from: Xray Chest 1 View-PORTABLE IMMEDIATE (Xray Chest 1 View-PORTABLE IMMEDIATE .) (06.18.21 @ 12:28) >  EXAM:  XR CHEST PORTABLE IMMED 1V                          PROCEDURE DATE:  06/18/2021      INTERPRETATION:  TECHNIQUE: A single AP view of the chest was obtained. Ordered time:   6/18/2021 12:28 PM    COMPARISON: 9/15/2020    CLINICAL INFORMATION: Shortness of breath    FINDINGS:    The patient is status post prior sternotomy and valve replacement.  The heart size is not well assessed on AP film.  The patient's chin obscures the apices.  There is moderate pulmonary edema.  There are no definite pleural effusions.  There are surgical clips which overlie the right upper lung.    IMPRESSION:    Moderate pulmonary edema.    SHIRA PAZ M.D., ATTENDING RADIOLOGIST  This document has been electronically signed.  SHIRA PAZ M.D., ATTENDING RADIOLOGIST  This document has been electronically signed. Jun 18 2021  1:00PM     ASSESSMENT AND PLAN:  ·	SOB  ·	Acute on chronic diastolic CHF.  ·	Acute COPD exacerbation.  ·	S/P Mitral and Tricuspid Valve Replacement.  ·	Atrial Fibrillation S/P Ablation.  ·	Chronic hypoxic hypercarbic Respiratory failure.  ·	Obesity.  ·	MIGUEL, did not tolerate CPAP.  ·	pHTN.  ·	Renal Insuffiencey.  ·	Anemia.    Continue O2, Lasix.  Follow renal function.  Continue Symbicort and nebulizer.  Follow H & H.
 IR consulted for bone marrow biopsy.      HPI:  · 70 years old female by ems c/o sob and chest discomfort when she is walking since 4 days ago. Pt uses 3 liter nc at home. Pt sts sob increases with walking and lying down. she has to use three pillows to sleep. Pt denies headache, dizziness, blurred visions, light sensitivities, focal/distal weakness or numbness, calfs pain, cough, nausea, vomiting, fever, chills, abd pain, dysuria, vaginal spotting or discharge, abnormal stool color or irregular bowel movements. Pt sts she stopped taking warfarin 3 weeks ago.  Patient recieved 40 mg of lasix iv, now feels better.   able to  speak in complete sentences.    (2021 13:43)    Acute on chronic diastolic CHF.  Acute COPD exacerbation.  S/P Mitral and Tricuspid Valve Replacement.  Atrial Fibrillation S/P Ablation.  Chronic hypoxic hypercarbic Respiratory failure.  Obesity.  MIGUEL, did not tolerate CPAP.  pHTN.  Renal Insuffiencey.  Anemia. to get  1 unit of PRBC today.   anemia work up in progress ? BM Biopsy  Left renal mass          PAST MEDICAL & SURGICAL HISTORY:  Atrial fibrillation  S/P Ablation    Pulmonary hypertension    MIGUEL (obstructive sleep apnea)    COPD (chronic obstructive pulmonary disease)  on home O2    CHF (congestive heart failure)    HTN (hypertension)    Gout    Mitral valve replaced    Tricuspid valve replaced    CHF (congestive heart failure)    Hypertension    COPD (chronic obstructive pulmonary disease)    History of mitral valve replacement with mechanical valve    Tricuspid valve replaced  mechanical    No significant past surgical history        Allergies    No Known Allergies    Intolerances        MEDICATIONS  (STANDING):  ALBUTerol    90 MICROgram(s) HFA Inhaler 1 Puff(s) Inhalation every 4 hours  albuterol/ipratropium for Nebulization 3 milliLiter(s) Nebulizer every 6 hours  allopurinol 100 milliGRAM(s) Oral daily  budesonide 160 MICROgram(s)/formoterol 4.5 MICROgram(s) Inhaler 2 Puff(s) Inhalation two times a day  pantoprazole    Tablet 40 milliGRAM(s) Oral before breakfast  senna 2 Tablet(s) Oral at bedtime  simvastatin 10 milliGRAM(s) Oral at bedtime  sodium chloride 0.65% Nasal 2 Spray(s) Both Nostrils four times a day  tiotropium 18 MICROgram(s) Capsule 1 Capsule(s) Inhalation daily    MEDICATIONS  (PRN):  magnesium hydroxide Suspension 30 milliLiter(s) Oral daily PRN Constipation        SOCIAL HISTORY:    FAMILY HISTORY:  Family history of hypertension (Father, Sibling)    Family history of stroke    Family history of hypertension (Mother)          PHYSICAL EXAM:    Vital Signs Last 24 Hrs  T(C): 37.1 (2021 10:20), Max: 37.1 (2021 00:50)  T(F): 98.7 (2021 10:20), Max: 98.8 (2021 00:50)  HR: 69 (2021 10:20) (63 - 79)  BP: 111/67 (2021 10:20) (111/67 - 128/67)  BP(mean): 78 (2021 16:28) (78 - 78)  RR: 17 (2021 10:20) (17 - 18)  SpO2: 98% (2021 10:20) (97% - 100%)    General:  Appears stated age, obese  Lungs:  CTAB  Cardiovascular:  good S1, S2,   Abdomen:  Soft, non-tender, non-distended,   Extremities:  no calf tenderness/swelling b/l  Neuro/Psych:  A &O x 3    LABS:                        7.4    7.31  )-----------( 490      ( 2021 09:02 )             27.0     06-21    137  |  96  |  55<H>  ----------------------------<  93  4.4   |  37<H>  |  1.58<H>    Ca    9.6      2021 07:35    TPro  8.6<H>  /  Alb  3.2<L>  /  TBili  0.4  /  DBili  .17  /  AST  22  /  ALT  16  /  AlkPhos  92  06-21    PT/INR - ( 2021 07:29 )   PT: 13.7 sec;   INR: 1.19 ratio           Urinalysis Basic - ( 2021 16:19 )    Color: Yellow / Appearance: Clear / S.015 / pH: x  Gluc: x / Ketone: Negative  / Bili: Negative / Urobili: Negative mg/dL   Blood: x / Protein: Negative mg/dL / Nitrite: Negative   Leuk Esterase: Negative / RBC: 0-2 /HPF / WBC 0-2   Sq Epi: x / Non Sq Epi: Few / Bacteria: Occasional        RADIOLOGY & ADDITIONAL STUDIES:  < from: Xray Chest 1 View-PORTABLE IMMEDIATE (Xray Chest 1 View-PORTABLE IMMEDIATE .) (21 @ 12:28) >  Moderate pulmonary edema.    < end of copied text >  < from: CT Abdomen and Pelvis No Cont (21 @ 15:42) >  IMPRESSION: Indeterminate left renal mass again noted.    < end of copied text >          
Reason for Consultation: Renal Mass    HPI: Patient is a 70y Female seen on consultatioin for the evaluation and management of Renal Mass    70 years old female by ems c/o sob and chest discomfort when she is walking since 4 days ago. Pt uses 3 liter nc at home. Pt sts sob increases with walking and lying down. she has to use three pillows to sleep. Pt denies headache, dizziness, blurred visions, light sensitivities, focal/distal weakness or numbness, calfs pain, cough, nausea, vomiting, fever, chills, abd pain, dysuria, vaginal spotting or discharge, abnormal stool color or irregular bowel movements. Pt sts she stopped taking warfarin 3 weeks ago.    Patient states she is aware of renal mass, was seen by Urology in January    PAST MEDICAL & SURGICAL HISTORY:  Atrial fibrillation  S/P Ablation    Pulmonary hypertension    MIGUEL (obstructive sleep apnea)    COPD (chronic obstructive pulmonary disease)  on home O2    CHF (congestive heart failure)    HTN (hypertension)    Gout    Mitral valve replaced    Tricuspid valve replaced    CHF (congestive heart failure)    Hypertension    COPD (chronic obstructive pulmonary disease)    History of mitral valve replacement with mechanical valve    Tricuspid valve replaced  mechanical    No significant past surgical history      MEDICATIONS  (STANDING):  ALBUTerol    90 MICROgram(s) HFA Inhaler 1 Puff(s) Inhalation every 4 hours  albuterol/ipratropium for Nebulization 3 milliLiter(s) Nebulizer every 6 hours  allopurinol 100 milliGRAM(s) Oral daily  budesonide 160 MICROgram(s)/formoterol 4.5 MICROgram(s) Inhaler 2 Puff(s) Inhalation two times a day  furosemide   Injectable 40 milliGRAM(s) IV Push daily  pantoprazole    Tablet 40 milliGRAM(s) Oral before breakfast  senna 2 Tablet(s) Oral at bedtime  simvastatin 10 milliGRAM(s) Oral at bedtime  sodium chloride 0.65% Nasal 2 Spray(s) Both Nostrils four times a day  tiotropium 18 MICROgram(s) Capsule 1 Capsule(s) Inhalation daily    MEDICATIONS  (PRN):  magnesium hydroxide Suspension 30 milliLiter(s) Oral daily PRN Constipation      Allergies    No Known Allergies    Intolerances        SOCIAL HISTORY:    Smoking Status: no  Alcohol: no  Occupation: no    FAMILY HISTORY:  Family history of hypertension (Father, Sibling)    Family history of stroke    Family history of hypertension (Mother)        Vital Signs Last 24 Hrs  T(C): 36.8 (20 Jun 2021 05:14), Max: 36.9 (19 Jun 2021 23:58)  T(F): 98.3 (20 Jun 2021 05:14), Max: 98.4 (19 Jun 2021 23:58)  HR: 77 (20 Jun 2021 05:51) (67 - 94)  BP: 130/66 (20 Jun 2021 05:14) (122/64 - 149/61)  BP(mean): --  RR: 18 (20 Jun 2021 05:14) (18 - 18)  SpO2: 97% (20 Jun 2021 05:51) (97% - 100%)    PHYSICAL EXAM:    general - AAO x 3  HEENT - No Icterus  CVS - RRR  RS - AE B/L  Abd - soft, NT  Ext - Pulses +        LABS:                        7.3    8.62  )-----------( 476      ( 20 Jun 2021 09:25 )             26.4     06-20    138  |  97  |  53<H>  ----------------------------<  128<H>  4.2   |  38<H>  |  1.62<H>    Ca    9.5      20 Jun 2021 07:29  Mg     2.3     06-18    TPro  9.0<H>  /  Alb  3.4  /  TBili  0.3  /  DBili  x   /  AST  23  /  ALT  19  /  AlkPhos  112  06-18    PT/INR - ( 20 Jun 2021 07:29 )   PT: 13.7 sec;   INR: 1.19 ratio         PTT - ( 18 Jun 2021 12:20 )  PTT:38.5 sec        RADIOLOGY & ADDITIONAL STUDIES:

## 2021-06-21 NOTE — CONSULT NOTE ADULT - REASON FOR ADMISSION
dyspnea on exertion/ Pul edema/ COPD/ HFwPEF/ MVR/TVR/ anemia / FOB positive

## 2021-06-22 LAB
ANION GAP SERPL CALC-SCNC: 1 MMOL/L — LOW (ref 5–17)
BUN SERPL-MCNC: 53 MG/DL — HIGH (ref 7–23)
CALCIUM SERPL-MCNC: 9.7 MG/DL — SIGNIFICANT CHANGE UP (ref 8.5–10.1)
CHLORIDE SERPL-SCNC: 98 MMOL/L — SIGNIFICANT CHANGE UP (ref 96–108)
CO2 SERPL-SCNC: 40 MMOL/L — HIGH (ref 22–31)
CREAT SERPL-MCNC: 1.72 MG/DL — HIGH (ref 0.5–1.3)
FERRITIN SERPL-MCNC: 21 NG/ML — SIGNIFICANT CHANGE UP (ref 15–150)
GLUCOSE SERPL-MCNC: 116 MG/DL — HIGH (ref 70–99)
HAPTOGLOB SERPL-MCNC: 71 MG/DL — SIGNIFICANT CHANGE UP (ref 34–200)
HCT VFR BLD CALC: 28.3 % — LOW (ref 34.5–45)
HGB BLD-MCNC: 8 G/DL — LOW (ref 11.5–15.5)
IRON SATN MFR SERPL: 29 UG/DL — LOW (ref 30–160)
IRON SATN MFR SERPL: 6 % — LOW (ref 14–50)
KAPPA LC SER QL IFE: 10.42 MG/DL — HIGH (ref 0.33–1.94)
KAPPA/LAMBDA FREE LIGHT CHAIN RATIO, SERUM: 1.19 RATIO — SIGNIFICANT CHANGE UP (ref 0.26–1.65)
LAMBDA LC SER QL IFE: 8.74 MG/DL — HIGH (ref 0.57–2.63)
LDH SERPL L TO P-CCNC: 410 U/L — HIGH (ref 50–242)
MCHC RBC-ENTMCNC: 24.9 PG — LOW (ref 27–34)
MCHC RBC-ENTMCNC: 28.3 GM/DL — LOW (ref 32–36)
MCV RBC AUTO: 88.2 FL — SIGNIFICANT CHANGE UP (ref 80–100)
NRBC # BLD: 0 /100 WBCS — SIGNIFICANT CHANGE UP (ref 0–0)
PLATELET # BLD AUTO: 405 K/UL — HIGH (ref 150–400)
POTASSIUM SERPL-MCNC: 4.4 MMOL/L — SIGNIFICANT CHANGE UP (ref 3.5–5.3)
POTASSIUM SERPL-SCNC: 4.4 MMOL/L — SIGNIFICANT CHANGE UP (ref 3.5–5.3)
RBC # BLD: 3.21 M/UL — LOW (ref 3.8–5.2)
RBC # FLD: 19.8 % — HIGH (ref 10.3–14.5)
SODIUM SERPL-SCNC: 139 MMOL/L — SIGNIFICANT CHANGE UP (ref 135–145)
TIBC SERPL-MCNC: 439 UG/DL — HIGH (ref 220–430)
UIBC SERPL-MCNC: 411 UG/DL — HIGH (ref 110–370)
WBC # BLD: 8.27 K/UL — SIGNIFICANT CHANGE UP (ref 3.8–10.5)
WBC # FLD AUTO: 8.27 K/UL — SIGNIFICANT CHANGE UP (ref 3.8–10.5)

## 2021-06-22 RX ORDER — FUROSEMIDE 40 MG
20 TABLET ORAL DAILY
Refills: 0 | Status: DISCONTINUED | OUTPATIENT
Start: 2021-06-22 | End: 2021-06-24

## 2021-06-22 RX ORDER — ACETAMINOPHEN 500 MG
650 TABLET ORAL EVERY 6 HOURS
Refills: 0 | Status: DISCONTINUED | OUTPATIENT
Start: 2021-06-22 | End: 2021-06-24

## 2021-06-22 RX ORDER — DILTIAZEM HCL 120 MG
30 CAPSULE, EXT RELEASE 24 HR ORAL EVERY 8 HOURS
Refills: 0 | Status: DISCONTINUED | OUTPATIENT
Start: 2021-06-22 | End: 2021-06-24

## 2021-06-22 RX ADMIN — Medication 650 MILLIGRAM(S): at 17:15

## 2021-06-22 RX ADMIN — SIMVASTATIN 10 MILLIGRAM(S): 20 TABLET, FILM COATED ORAL at 21:39

## 2021-06-22 RX ADMIN — Medication 3 MILLILITER(S): at 11:00

## 2021-06-22 RX ADMIN — Medication 2 SPRAY(S): at 05:16

## 2021-06-22 RX ADMIN — Medication 2 SPRAY(S): at 00:47

## 2021-06-22 RX ADMIN — Medication 3 MILLILITER(S): at 00:09

## 2021-06-22 RX ADMIN — SENNA PLUS 2 TABLET(S): 8.6 TABLET ORAL at 21:38

## 2021-06-22 RX ADMIN — PANTOPRAZOLE SODIUM 40 MILLIGRAM(S): 20 TABLET, DELAYED RELEASE ORAL at 07:07

## 2021-06-22 RX ADMIN — Medication 30 MILLIGRAM(S): at 21:39

## 2021-06-22 RX ADMIN — Medication 3 MILLILITER(S): at 05:11

## 2021-06-22 RX ADMIN — Medication 3 MILLILITER(S): at 17:06

## 2021-06-22 RX ADMIN — Medication 2 SPRAY(S): at 11:15

## 2021-06-22 RX ADMIN — Medication 100 MILLIGRAM(S): at 11:15

## 2021-06-22 RX ADMIN — Medication 3 MILLILITER(S): at 23:41

## 2021-06-22 RX ADMIN — Medication 2 SPRAY(S): at 17:15

## 2021-06-22 RX ADMIN — Medication 20 MILLIGRAM(S): at 13:44

## 2021-06-22 NOTE — PROGRESS NOTE ADULT - PROBLEM SELECTOR PLAN 1
- s/p BM biopsy  - myeloma w/u pending   - LDH and Haptoglobin pending, bilirubin normal  - supportive care

## 2021-06-23 RX ORDER — IRON SUCROSE 20 MG/ML
200 INJECTION, SOLUTION INTRAVENOUS ONCE
Refills: 0 | Status: COMPLETED | OUTPATIENT
Start: 2021-06-23 | End: 2021-06-23

## 2021-06-23 RX ORDER — IRON SUCROSE 20 MG/ML
100 INJECTION, SOLUTION INTRAVENOUS EVERY 24 HOURS
Refills: 0 | Status: DISCONTINUED | OUTPATIENT
Start: 2021-06-24 | End: 2021-06-24

## 2021-06-23 RX ORDER — FOLIC ACID 0.8 MG
1 TABLET ORAL DAILY
Refills: 0 | Status: DISCONTINUED | OUTPATIENT
Start: 2021-06-23 | End: 2021-06-24

## 2021-06-23 RX ADMIN — PANTOPRAZOLE SODIUM 40 MILLIGRAM(S): 20 TABLET, DELAYED RELEASE ORAL at 05:16

## 2021-06-23 RX ADMIN — Medication 1 MILLIGRAM(S): at 11:49

## 2021-06-23 RX ADMIN — Medication 650 MILLIGRAM(S): at 22:46

## 2021-06-23 RX ADMIN — Medication 3 MILLILITER(S): at 17:18

## 2021-06-23 RX ADMIN — Medication 20 MILLIGRAM(S): at 05:12

## 2021-06-23 RX ADMIN — SENNA PLUS 2 TABLET(S): 8.6 TABLET ORAL at 21:58

## 2021-06-23 RX ADMIN — Medication 2 SPRAY(S): at 11:50

## 2021-06-23 RX ADMIN — SIMVASTATIN 10 MILLIGRAM(S): 20 TABLET, FILM COATED ORAL at 21:58

## 2021-06-23 RX ADMIN — Medication 30 MILLIGRAM(S): at 05:12

## 2021-06-23 RX ADMIN — Medication 3 MILLILITER(S): at 11:27

## 2021-06-23 RX ADMIN — IRON SUCROSE 110 MILLIGRAM(S): 20 INJECTION, SOLUTION INTRAVENOUS at 11:49

## 2021-06-23 RX ADMIN — Medication 100 MILLIGRAM(S): at 11:49

## 2021-06-23 RX ADMIN — Medication 2 SPRAY(S): at 00:52

## 2021-06-23 RX ADMIN — Medication 2 SPRAY(S): at 17:09

## 2021-06-23 RX ADMIN — Medication 30 MILLIGRAM(S): at 21:58

## 2021-06-23 RX ADMIN — Medication 3 MILLILITER(S): at 05:11

## 2021-06-23 RX ADMIN — Medication 30 MILLIGRAM(S): at 13:34

## 2021-06-23 RX ADMIN — Medication 650 MILLIGRAM(S): at 22:10

## 2021-06-23 RX ADMIN — Medication 2 SPRAY(S): at 05:12

## 2021-06-23 NOTE — PROGRESS NOTE ADULT - NUTRITIONAL ASSESSMENT
This patient has been assessed with a concern for Malnutrition and has been determined to have a diagnosis/diagnoses of Morbid obesity (BMI > 40).    This patient is being managed with:   Diet Regular-  1500mL Fluid Restriction ([id number])  Low Sodium  Supplement Feeding Modality:  Oral  Ensure Pudding Cans or Servings Per Day:  2       Frequency:  Daily  Entered: Jun 20 2021 12:12PM    
This patient has been assessed with a concern for Malnutrition and has been determined to have a diagnosis/diagnoses of Morbid obesity (BMI > 40).    This patient is being managed with:   Diet Regular-  1500mL Fluid Restriction ([id number])  Low Sodium  Supplement Feeding Modality:  Oral  Ensure Pudding Cans or Servings Per Day:  2       Frequency:  Daily  Entered: Jun 20 2021 12:12PM    
This patient has been assessed with a concern for Malnutrition and has been determined to have a diagnosis/diagnoses of Morbid obesity (BMI > 40).    This patient is being managed with:   Diet Regular-  1500mL Fluid Restriction ([id number])  Low Sodium  Supplement Feeding Modality:  Oral  Ensure Pudding Cans or Servings Per Day:  2       Frequency:  Daily  Entered: Jun 20 2021 12:12PM

## 2021-06-23 NOTE — PROGRESS NOTE ADULT - TIME BILLING
clinical evaluation/ review labs/  discuss with patient/ hematology/ team/ NP
clinical eval/ review labs/ discuss with patient/ team/ hematology/ oncology
clinical evaluation/ review labs, order  PRBC,  discuss with patient/ Rn/ team.
clinical eval/ review labs, discuss with patient , ./team
clinical evaluation,  review labs, consult notes,  discuss with patient/ team.  adjust meds

## 2021-06-23 NOTE — PROGRESS NOTE ADULT - PROBLEM SELECTOR PLAN 1
- s/p BM biopsy results pending  - low Iron levels - will dose IV iron  - ideally patient needs GI evaluation   - supportive care

## 2021-06-24 ENCOUNTER — TRANSCRIPTION ENCOUNTER (OUTPATIENT)
Age: 71
End: 2021-06-24

## 2021-06-24 VITALS — OXYGEN SATURATION: 96 %

## 2021-06-24 RX ORDER — SENNA PLUS 8.6 MG/1
2 TABLET ORAL
Qty: 60 | Refills: 0
Start: 2021-06-24 | End: 2021-07-23

## 2021-06-24 RX ORDER — FUROSEMIDE 40 MG
1 TABLET ORAL
Qty: 0 | Refills: 0 | DISCHARGE
Start: 2021-06-24

## 2021-06-24 RX ORDER — BUMETANIDE 0.25 MG/ML
1 INJECTION INTRAMUSCULAR; INTRAVENOUS
Qty: 60 | Refills: 0

## 2021-06-24 RX ORDER — FERROUS SULFATE 325(65) MG
1 TABLET ORAL
Qty: 30 | Refills: 0
Start: 2021-06-24 | End: 2021-07-23

## 2021-06-24 RX ORDER — WARFARIN SODIUM 2.5 MG/1
1 TABLET ORAL
Qty: 30 | Refills: 0

## 2021-06-24 RX ORDER — FOLIC ACID 0.8 MG
1 TABLET ORAL
Qty: 30 | Refills: 0
Start: 2021-06-24 | End: 2021-07-23

## 2021-06-24 RX ORDER — DILTIAZEM HCL 120 MG
1 CAPSULE, EXT RELEASE 24 HR ORAL
Qty: 0 | Refills: 0 | DISCHARGE
Start: 2021-06-24

## 2021-06-24 RX ADMIN — Medication 2 SPRAY(S): at 05:43

## 2021-06-24 RX ADMIN — Medication 20 MILLIGRAM(S): at 05:43

## 2021-06-24 RX ADMIN — Medication 2 SPRAY(S): at 12:16

## 2021-06-24 RX ADMIN — Medication 2 SPRAY(S): at 01:05

## 2021-06-24 RX ADMIN — Medication 3 MILLILITER(S): at 17:06

## 2021-06-24 RX ADMIN — IRON SUCROSE 210 MILLIGRAM(S): 20 INJECTION, SOLUTION INTRAVENOUS at 10:34

## 2021-06-24 RX ADMIN — Medication 1 MILLIGRAM(S): at 12:16

## 2021-06-24 RX ADMIN — Medication 30 MILLIGRAM(S): at 12:18

## 2021-06-24 RX ADMIN — Medication 3 MILLILITER(S): at 05:10

## 2021-06-24 RX ADMIN — Medication 100 MILLIGRAM(S): at 12:16

## 2021-06-24 RX ADMIN — Medication 2 SPRAY(S): at 17:17

## 2021-06-24 RX ADMIN — PANTOPRAZOLE SODIUM 40 MILLIGRAM(S): 20 TABLET, DELAYED RELEASE ORAL at 05:44

## 2021-06-24 RX ADMIN — Medication 30 MILLIGRAM(S): at 05:43

## 2021-06-24 RX ADMIN — Medication 3 MILLILITER(S): at 00:01

## 2021-06-24 RX ADMIN — Medication 3 MILLILITER(S): at 11:26

## 2021-06-24 NOTE — PROGRESS NOTE ADULT - NSICDXPILOT_GEN_ALL_CORE
[place]

## 2021-06-24 NOTE — DISCHARGE NOTE NURSING/CASE MANAGEMENT/SOCIAL WORK - PATIENT PORTAL LINK FT
You can access the FollowMyHealth Patient Portal offered by [facility] by registering at the following website: [email or web address]. By joining [facility]’s FollowMyHealth portal, you will also be able to view your health information using other applications (apps) compatible with our system.

## 2021-06-24 NOTE — DISCHARGE NOTE PROVIDER - NSDCCPCAREPLAN_GEN_ALL_CORE_FT
PRINCIPAL DISCHARGE DIAGNOSIS  Diagnosis: CHF (congestive heart failure)  Assessment and Plan of Treatment: follow up with PMD Dr. Mojica, Dr. Elizabeth, Dr. DAVIS AS OUT PATIENT.      SECONDARY DISCHARGE DIAGNOSES  Diagnosis: Anemia  Assessment and Plan of Treatment: FERROUS SULFATE 325MG PO DAILY. FLLOW UP WITH HEMATOLOGY WITH BONE MARROW BIOPSY RESULTS.

## 2021-06-24 NOTE — PROGRESS NOTE ADULT - SUBJECTIVE AND OBJECTIVE BOX
INTERVAL HPI:   70 year female with HTN, A fib, Mitral & Tricuspid valve replacement, pHTN, Diastolic CHF, Atrial Fibrillation S/P Ablation, COPD(never smoked but reports 2nd hand exposure from ), Chronic hypoxic hypercarbic Respiratory failure  on 3 litre home O2, Obesity, MIGUEL(did not tolerate CPAP), Gout, Anemia, Renal Insuffiencey  Brought by EMS c/o sob and chest discomfort, more  when she is walking, worse for  4 days ago. Reports PND, uses 3 liter nc at home.   Denies headache, dizziness, blurred visions, light sensitivities, focal/distal weakness or numbness, calfs pain, cough, nausea, vomiting, fever, chills, abd pain,  Stopped taking warfarin 3 weeks ago, was told from her doctor office that she does not need it.  Patient received 40 mg of Lasix iv in ED.  06/21/21:  Bone marrow biopsy    OVERNIGHT EVENTS:  Awake, comfortable and feels better.    Vital Signs Last 24 Hrs  T(C): 36.7 (24 Jun 2021 11:00), Max: 36.8 (24 Jun 2021 00:02)  T(F): 98.1 (24 Jun 2021 11:00), Max: 98.3 (24 Jun 2021 00:02)  HR: 70 (24 Jun 2021 11:41) (66 - 84)  BP: 144/73 (24 Jun 2021 11:00) (129/60 - 163/75)  BP(mean): 105 (23 Jun 2021 16:53) (105 - 105)  RR: 18 (24 Jun 2021 11:00) (18 - 18)  SpO2: 95% (24 Jun 2021 11:41) (95% - 99%)    PHYSICAL EXAM:  GEN:         Awake, responsive and comfortable.  HEENT:    Normal.    RESP:        no wheezing.  CVS:          Regular rate and rhythm.   ABD:         Soft, non-tender, non-distended;     MEDICATIONS  (STANDING):  ALBUTerol    90 MICROgram(s) HFA Inhaler 1 Puff(s) Inhalation every 4 hours  albuterol/ipratropium for Nebulization 3 milliLiter(s) Nebulizer every 6 hours  allopurinol 100 milliGRAM(s) Oral daily  budesonide 160 MICROgram(s)/formoterol 4.5 MICROgram(s) Inhaler 2 Puff(s) Inhalation two times a day  diltiazem    Tablet 30 milliGRAM(s) Oral every 8 hours  folic acid 1 milliGRAM(s) Oral daily  furosemide    Tablet 20 milliGRAM(s) Oral daily  iron sucrose IVPB 100 milliGRAM(s) IV Intermittent every 24 hours  pantoprazole    Tablet 40 milliGRAM(s) Oral before breakfast  senna 2 Tablet(s) Oral at bedtime  simvastatin 10 milliGRAM(s) Oral at bedtime  sodium chloride 0.65% Nasal 2 Spray(s) Both Nostrils four times a day  tiotropium 18 MICROgram(s) Capsule 1 Capsule(s) Inhalation daily    MEDICATIONS  (PRN):  acetaminophen   Tablet .. 650 milliGRAM(s) Oral every 6 hours PRN Temp greater or equal to 38C (100.4F), Moderate Pain (4 - 6)  magnesium hydroxide Suspension 30 milliLiter(s) Oral daily PRN Constipation    06-18 @ 13:24  pH: 7.38  pCO2: 66  pO2: 87  SaO2: 96    ASSESSMENT AND PLAN:  ·	SOB  ·	Acute on chronic diastolic CHF.  ·	Acute COPD exacerbation.  ·	S/P Mitral and Tricuspid Valve Replacement.  ·	Atrial Fibrillation S/P Ablation.  ·	Chronic hypoxic hypercarbic Respiratory failure.  ·	Obesity.  ·	MIGUEL, did not tolerate CPAP.  ·	pHTN.  ·	Renal Insuffiencey.  ·	Anemia.    Continue with her base line O2.  Did not tolerate CPAP.  Continue diuretics, Symbicort and nebulizer.  
  INTERVAL HPI:  70 year female with HTN, A fib, Mitral & Tricuspid valve replacement, pHTN, Diastolic CHF, Atrial Fibrillation S/P Ablation, COPD(never smoked but reports 2nd hand exposure from ), Chronic hypoxic hypercarbic Respiratory failure  on 3 litre home O2, Obesity, MIGUEL(did not tolerate CPAP), Gout, Anemia, Renal Insuffiencey  Brought by EMS c/o sob and chest discomfort, more  when she is walking, worse for  4 days ago. Reports PND, uses 3 liter nc at home.   Denies headache, dizziness, blurred visions, light sensitivities, focal/distal weakness or numbness, calfs pain, cough, nausea, vomiting, fever, chills, abd pain,  Stopped taking warfarin 3 weeks ago, was told from her doctor office that she does not need it.  Patient received 40 mg of Lasix iv in ED.  06/21/21:  Bone marrow biopsy    OVERNIGHT EVENTS:  Feels better.    Vital Signs Last 24 Hrs  T(C): 36.4 (23 Jun 2021 16:53), Max: 36.8 (23 Jun 2021 05:33)  T(F): 97.5 (23 Jun 2021 16:53), Max: 98.3 (23 Jun 2021 05:33)  HR: 76 (23 Jun 2021 16:53) (63 - 82)  BP: 163/75 (23 Jun 2021 16:53) (123/73 - 163/75)  BP(mean): 105 (23 Jun 2021 16:53) (105 - 105)  RR: 18 (23 Jun 2021 16:53) (18 - 18)  SpO2: 96% (23 Jun 2021 16:53) (94% - 99%)    PHYSICAL EXAM:  GEN:         Awake, responsive and comfortable.  HEENT:    Normal.    RESP:       no distress  CVS:         Regular rate and rhythm.   ABD:         Soft, non-tender, non-distended;     MEDICATIONS  (STANDING):  ALBUTerol    90 MICROgram(s) HFA Inhaler 1 Puff(s) Inhalation every 4 hours  albuterol/ipratropium for Nebulization 3 milliLiter(s) Nebulizer every 6 hours  allopurinol 100 milliGRAM(s) Oral daily  budesonide 160 MICROgram(s)/formoterol 4.5 MICROgram(s) Inhaler 2 Puff(s) Inhalation two times a day  diltiazem    Tablet 30 milliGRAM(s) Oral every 8 hours  folic acid 1 milliGRAM(s) Oral daily  furosemide    Tablet 20 milliGRAM(s) Oral daily  pantoprazole    Tablet 40 milliGRAM(s) Oral before breakfast  senna 2 Tablet(s) Oral at bedtime  simvastatin 10 milliGRAM(s) Oral at bedtime  sodium chloride 0.65% Nasal 2 Spray(s) Both Nostrils four times a day  tiotropium 18 MICROgram(s) Capsule 1 Capsule(s) Inhalation daily    MEDICATIONS  (PRN):  acetaminophen   Tablet .. 650 milliGRAM(s) Oral every 6 hours PRN Temp greater or equal to 38C (100.4F), Moderate Pain (4 - 6)  magnesium hydroxide Suspension 30 milliLiter(s) Oral daily PRN Constipation    LABS:                        8.0    8.27  )-----------( 405      ( 22 Jun 2021 10:42 )             28.3     06-22    139  |  98  |  53<H>  ----------------------------<  116<H>  4.4   |  40<H>  |  1.72<H>    Ca    9.7      22 Jun 2021 06:38    06-18 @ 13:24  pH: 7.38  pCO2: 66  pO2: 87  SaO2: 96    ASSESSMENT AND PLAN:  ·	SOB  ·	Acute on chronic diastolic CHF.  ·	Acute COPD exacerbation.  ·	S/P Mitral and Tricuspid Valve Replacement.  ·	Atrial Fibrillation S/P Ablation.  ·	Chronic hypoxic hypercarbic Respiratory failure.  ·	Obesity.  ·	MIGUEL, did not tolerate CPAP.  ·	pHTN.  ·	Renal Insuffiencey.  ·	Anemia.    Breathing better.  Continue O2, diuretics and nebulizer.  Awaiting bone marrow biopsy results.
Patient is a 70y old  Female who presents with a chief complaint of dyspnea on exertion/ Pul edema/ COPD/ HFwPEF/ MVR/TVR/ anemia / FOB positive (18 Jun 2021 19:03)  feeling better.    Hb 7.0- could not get crssmatched  PRBC yesterday because of antibodies. . She is getting her  unit of PRBC now.   no distress   vitals stable.   telemetry - a fib with controoled VR 71/mt.   no chest pain . no abd pains   patient has hx of left renal mass on prior ct scan. - will get  oncology consult.  anmia most likely result of gi bleed and CKD  with   elevated INR. . - will monitor INR  and occult blood.   PUl  consult appreciated.    INTERVAL HPI/OVERNIGHT EVENTS:  PAST MEDICAL & SURGICAL HISTORY:  Atrial fibrillation  S/P Ablation    Pulmonary hypertension    MIGUEL (obstructive sleep apnea)    COPD (chronic obstructive pulmonary disease)  on home O2    CHF (congestive heart failure)    HTN (hypertension)    Gout    Mitral valve replaced    Tricuspid valve replaced    CHF (congestive heart failure)    Hypertension    COPD (chronic obstructive pulmonary disease)    History of mitral valve replacement with mechanical valve    Tricuspid valve replaced  mechanical    No significant past surgical history        MEDICATIONS  (STANDING):  ALBUTerol    90 MICROgram(s) HFA Inhaler 1 Puff(s) Inhalation every 4 hours  albuterol/ipratropium for Nebulization 3 milliLiter(s) Nebulizer every 6 hours  allopurinol 100 milliGRAM(s) Oral daily  budesonide 160 MICROgram(s)/formoterol 4.5 MICROgram(s) Inhaler 2 Puff(s) Inhalation two times a day  furosemide   Injectable 40 milliGRAM(s) IV Push daily  furosemide   Injectable 40 milliGRAM(s) IV Push once  pantoprazole    Tablet 40 milliGRAM(s) Oral before breakfast  senna 2 Tablet(s) Oral at bedtime  simvastatin 10 milliGRAM(s) Oral at bedtime  sodium chloride 0.65% Nasal 2 Spray(s) Both Nostrils four times a day  tiotropium 18 MICROgram(s) Capsule 1 Capsule(s) Inhalation daily    MEDICATIONS  (PRN):  magnesium hydroxide Suspension 30 milliLiter(s) Oral daily PRN Constipation      Allergies    No Known Allergies    Intolerances        REVIEW OF SYSTEMS:  CONSTITUTIONAL: No fever, weight loss, or fatigue  EYES: No eye pain, visual disturbances, or discharge  ENMT:  No difficulty hearing, tinnitus, vertigo; No sinus or throat pain  NECK: No pain or stiffness  BREASTS: No pain, masses, or nipple discharge  RESPIRATORY: No cough, wheezing, chills or hemoptysis; No shortness of breath  CARDIOVASCULAR: No chest pain, palpitations, dizziness, or leg swelling  GASTROINTESTINAL: No abdominal or epigastric pain. No nausea, vomiting, or hematemesis; No diarrhea or constipation. No melena or hematochezia.  GENITOURINARY: No dysuria, frequency, hematuria, or incontinence  NEUROLOGICAL: No headaches, memory loss, loss of strength, numbness, or tremors  SKIN: No itching, burning, rashes, or lesions   LYMPH NODES: No enlarged glands  ENDOCRINE: No heat or cold intolerance; No hair loss  MUSCULOSKELETAL: No joint pain or swelling; No muscle, back, or extremity pain  PSYCHIATRIC: No depression, anxiety, mood swings, or difficulty sleeping  HEME/LYMPH: No easy bruising, or bleeding gums  ALLERY AND IMMUNOLOGIC: No hives or eczema    Vital Signs Last 24 Hrs  T(C): 36.3 (19 Jun 2021 07:57), Max: 37.2 (18 Jun 2021 13:27)  T(F): 97.4 (19 Jun 2021 07:57), Max: 98.9 (18 Jun 2021 13:27)  HR: 71 (19 Jun 2021 07:57) (57 - 89)  BP: 160/69 (19 Jun 2021 07:57) (127/72 - 160/69)  BP(mean): --  RR: 18 (19 Jun 2021 07:57) (17 - 26)  SpO2: 100% (19 Jun 2021 07:57) (96% - 100%)    PHYSICAL EXAM:  GENERAL: NAD, well-groomed, well-developed  HEAD:  Atraumatic, Normocephalic  EYES: EOMI, PERRLA, conjunctiva and sclera clear  ENMT: No tonsillar erythema, exudates, or enlargement; Moist mucous membranes, Good dentition, No lesions  NECK: Supple, No JVD, Normal thyroid  NERVOUS SYSTEM:  Alert & Oriented X3, Good concentration; Motor Strength 5/5 B/L upper and lower extremities; DTRs 2+ intact and symmetric  CHEST/LUNG: Clear to percussion bilaterally; No rales, rhonchi, wheezing, or rubs  HEART: Regular rate and rhythm; No murmurs, rubs, or gallops  ABDOMEN: Soft, Nontender, Nondistended; Bowel sounds present  EXTREMITIES:  2+ Peripheral Pulses, No clubbing, cyanosis, or edema  LYMPH: No lymphadenopathy noted  SKIN: No rashes or lesions    LABS:                        7.0    8.21  )-----------( 481      ( 18 Jun 2021 15:55 )             25.7     06-18    138  |  96  |  53<H>  ----------------------------<  97  4.4   |  39<H>  |  1.54<H>    Ca    9.5      18 Jun 2021 15:55  Mg     2.3     06-18    TPro  9.0<H>  /  Alb  3.4  /  TBili  0.3  /  DBili  x   /  AST  23  /  ALT  19  /  AlkPhos  112  06-18      PT/INR - ( 18 Jun 2021 12:20 )   PT: 13.5 sec;   INR: 1.17 ratio         PTT - ( 18 Jun 2021 12:20 )  PTT:38.5 sec    CAPILLARY BLOOD GLUCOSE        ABG - ( 18 Jun 2021 13:24 )  pH, Arterial: x     pH, Blood: 7.38  /  pCO2: 66    /  pO2: 87    / HCO3: 38    / Base Excess: 12.0  /  SaO2: 96                CARDIAC MARKERS ( 18 Jun 2021 12:20 )  .018 ng/mL / x     / x     / x     / x                RADIOLOGY & ADDITIONAL TESTS:    Imaging Personally Reviewed:  [ ] YES  [ ] NO    Consultant(s) Notes Reviewed:  [ ] YES  [ ] NO    Care Discussed with Consultants/Other Providers [ ] YES  [ ] NO    Care discussed with family,         [  ]   yes  [  ]  No    imp:    stable[ ]    unstable[  ]     improving [  x ]       unchanged  [  ]                Plans:  Continue present plans  [x  ] PRBC today,  repeat occult blood today,                 New consult [  ]   specialty  ...Oncology....               order test[  ]    test name.                  Discharge Planning  [  ]           
Patient is a 70y old  Female who presents with a chief complaint of dyspnea on exertion/ Pul edema/ COPD/ HFwPEF/ MVR/TVR/ anemia / FOB positive (19 Jun 2021 17:32)  still has  sob, no distress   o2 dependent.  Vitals stable   In sinus rhythm toddy.    TTe in April shows severe PUL HTN  still anemia Hb 7,2 after 1 unit of PRBC   has solid renal mass-  Oncology consult requested.    INTERVAL HPI/OVERNIGHT EVENTS:  PAST MEDICAL & SURGICAL HISTORY:  Atrial fibrillation  S/P Ablation    Pulmonary hypertension    MIGUEL (obstructive sleep apnea)    COPD (chronic obstructive pulmonary disease)  on home O2    CHF (congestive heart failure)    HTN (hypertension)    Gout    Mitral valve replaced    Tricuspid valve replaced    CHF (congestive heart failure)    Hypertension    COPD (chronic obstructive pulmonary disease)    History of mitral valve replacement with mechanical valve    Tricuspid valve replaced  mechanical    No significant past surgical history        MEDICATIONS  (STANDING):  ALBUTerol    90 MICROgram(s) HFA Inhaler 1 Puff(s) Inhalation every 4 hours  albuterol/ipratropium for Nebulization 3 milliLiter(s) Nebulizer every 6 hours  allopurinol 100 milliGRAM(s) Oral daily  budesonide 160 MICROgram(s)/formoterol 4.5 MICROgram(s) Inhaler 2 Puff(s) Inhalation two times a day  furosemide   Injectable 40 milliGRAM(s) IV Push daily  pantoprazole    Tablet 40 milliGRAM(s) Oral before breakfast  senna 2 Tablet(s) Oral at bedtime  simvastatin 10 milliGRAM(s) Oral at bedtime  sodium chloride 0.65% Nasal 2 Spray(s) Both Nostrils four times a day  tiotropium 18 MICROgram(s) Capsule 1 Capsule(s) Inhalation daily    MEDICATIONS  (PRN):  magnesium hydroxide Suspension 30 milliLiter(s) Oral daily PRN Constipation      Allergies    No Known Allergies    Intolerances        REVIEW OF SYSTEMS:  CONSTITUTIONAL: No fever, weight loss, or fatigue  EYES: No eye pain, visual disturbances, or discharge  ENMT:  No difficulty hearing, tinnitus, vertigo; No sinus or throat pain  NECK: No pain or stiffness  BREASTS: No pain, masses, or nipple discharge  RESPIRATORY: No cough, wheezing, chills or hemoptysis; No shortness of breath  CARDIOVASCULAR: No chest pain, palpitations, dizziness, or leg swelling  GASTROINTESTINAL: No abdominal or epigastric pain. No nausea, vomiting, or hematemesis; No diarrhea or constipation. No melena or hematochezia.  GENITOURINARY: No dysuria, frequency, hematuria, or incontinence  NEUROLOGICAL: No headaches, memory loss, loss of strength, numbness, or tremors  SKIN: No itching, burning, rashes, or lesions   LYMPH NODES: No enlarged glands  ENDOCRINE: No heat or cold intolerance; No hair loss  MUSCULOSKELETAL: No joint pain or swelling; No muscle, back, or extremity pain  PSYCHIATRIC: No depression, anxiety, mood swings, or difficulty sleeping  HEME/LYMPH: No easy bruising, or bleeding gums  ALLERY AND IMMUNOLOGIC: No hives or eczema    Vital Signs Last 24 Hrs  T(C): 36.8 (20 Jun 2021 05:14), Max: 36.9 (19 Jun 2021 23:58)  T(F): 98.3 (20 Jun 2021 05:14), Max: 98.4 (19 Jun 2021 23:58)  HR: 77 (20 Jun 2021 05:51) (67 - 94)  BP: 130/66 (20 Jun 2021 05:14) (102/50 - 149/61)  BP(mean): --  RR: 18 (20 Jun 2021 05:14) (18 - 18)  SpO2: 97% (20 Jun 2021 05:51) (97% - 100%)    PHYSICAL EXAM:  GENERAL: NAD, well-groomed, well-developed  HEAD:  Atraumatic, Normocephalic  EYES: EOMI, PERRLA, conjunctiva and sclera clear  ENMT: No tonsillar erythema, exudates, or enlargement; Moist mucous membranes, Good dentition, No lesions  NECK: Supple, No JVD, Normal thyroid  NERVOUS SYSTEM:  Alert & Oriented X3, Good concentration; Motor Strength 5/5 B/L upper and lower extremities; DTRs 2+ intact and symmetric  CHEST/LUNG: Clear to percussion bilaterally; No rales, rhonchi, wheezing, or rubs  HEART: Regular rate and rhythm; No murmurs, rubs, or gallops  ABDOMEN: Soft, Nontender, Nondistended; Bowel sounds present  EXTREMITIES:  2+ Peripheral Pulses, No clubbing, cyanosis, or edema  LYMPH: No lymphadenopathy noted  SKIN: No rashes or lesions    LABS:                        7.3    8.62  )-----------( 476      ( 20 Jun 2021 09:25 )             26.4     06-20    138  |  97  |  53<H>  ----------------------------<  128<H>  4.2   |  38<H>  |  1.62<H>    Ca    9.5      20 Jun 2021 07:29  Mg     2.3     06-18    TPro  9.0<H>  /  Alb  3.4  /  TBili  0.3  /  DBili  x   /  AST  23  /  ALT  19  /  AlkPhos  112  06-18      PT/INR - ( 20 Jun 2021 07:29 )   PT: 13.7 sec;   INR: 1.19 ratio         PTT - ( 18 Jun 2021 12:20 )  PTT:38.5 sec    CAPILLARY BLOOD GLUCOSE        ABG - ( 18 Jun 2021 13:24 )  pH, Arterial: x     pH, Blood: 7.38  /  pCO2: 66    /  pO2: 87    / HCO3: 38    / Base Excess: 12.0  /  SaO2: 96                CARDIAC MARKERS ( 18 Jun 2021 12:20 )  .018 ng/mL / x     / x     / x     / x                RADIOLOGY & ADDITIONAL TESTS:    Imaging Personally Reviewed:  [ ] YES  [ ] NO    Consultant(s) Notes Reviewed:  [ ] YES  [ ] NO    Care Discussed with Consultants/Other Providers [ ] YES  [ ] NO    Care discussed with family,         [  ]   yes  [  ]  No    imp:    stable[ ]    unstable[  ]     improving [x   ]       unchanged  [  ]                Plans:  Continue present plans  [ x ] PUL and Onc/ heme Follow up.               New consult [  ]   specialty  .......               order test[  ]    test name.  labs in AM                Discharge Planning  [  ]           
Patient is a 70y old  Female who presents with a chief complaint of dyspnea on exertion/ Pul edema/ COPD/ HFwPEF/ MVR/TVR/ anemia / FOB positive (2021 09:52)   has still dyspnea.   Hb 8.0   is iron deficient   is in sinus rhythm with  occational  small runs of SVT.  - will add anastacio   has Bm Biopsy  yesterday   PUl and hematology notes    appreciated    INTERVAL HPI/OVERNIGHT EVENTS:  PAST MEDICAL & SURGICAL HISTORY:  Atrial fibrillation  S/P Ablation    Pulmonary hypertension    MIGUEL (obstructive sleep apnea)    COPD (chronic obstructive pulmonary disease)  on home O2    CHF (congestive heart failure)    HTN (hypertension)    Gout    Mitral valve replaced    Tricuspid valve replaced    CHF (congestive heart failure)    Hypertension    COPD (chronic obstructive pulmonary disease)    History of mitral valve replacement with mechanical valve    Tricuspid valve replaced  mechanical    No significant past surgical history        MEDICATIONS  (STANDING):  ALBUTerol    90 MICROgram(s) HFA Inhaler 1 Puff(s) Inhalation every 4 hours  albuterol/ipratropium for Nebulization 3 milliLiter(s) Nebulizer every 6 hours  allopurinol 100 milliGRAM(s) Oral daily  budesonide 160 MICROgram(s)/formoterol 4.5 MICROgram(s) Inhaler 2 Puff(s) Inhalation two times a day  pantoprazole    Tablet 40 milliGRAM(s) Oral before breakfast  senna 2 Tablet(s) Oral at bedtime  simvastatin 10 milliGRAM(s) Oral at bedtime  sodium chloride 0.65% Nasal 2 Spray(s) Both Nostrils four times a day  tiotropium 18 MICROgram(s) Capsule 1 Capsule(s) Inhalation daily    MEDICATIONS  (PRN):  magnesium hydroxide Suspension 30 milliLiter(s) Oral daily PRN Constipation      Allergies    No Known Allergies    Intolerances        REVIEW OF SYSTEMS:  CONSTITUTIONAL: No fever, weight loss, or fatigue  EYES: No eye pain, visual disturbances, or discharge  ENMT:  No difficulty hearing, tinnitus, vertigo; No sinus or throat pain  NECK: No pain or stiffness  BREASTS: No pain, masses, or nipple discharge  RESPIRATORY: No cough, wheezing, chills or hemoptysis; No shortness of breath  CARDIOVASCULAR: No chest pain, palpitations, dizziness, or leg swelling  GASTROINTESTINAL: No abdominal or epigastric pain. No nausea, vomiting, or hematemesis; No diarrhea or constipation. No melena or hematochezia.  GENITOURINARY: No dysuria, frequency, hematuria, or incontinence  NEUROLOGICAL: No headaches, memory loss, loss of strength, numbness, or tremors  SKIN: No itching, burning, rashes, or lesions   LYMPH NODES: No enlarged glands  ENDOCRINE: No heat or cold intolerance; No hair loss  MUSCULOSKELETAL: No joint pain or swelling; No muscle, back, or extremity pain  PSYCHIATRIC: No depression, anxiety, mood swings, or difficulty sleeping  HEME/LYMPH: No easy bruising, or bleeding gums  ALLERY AND IMMUNOLOGIC: No hives or eczema    Vital Signs Last 24 Hrs  T(C): 36.4 (2021 05:51), Max: 36.9 (2021 14:15)  T(F): 97.6 (2021 05:51), Max: 98.4 (2021 14:15)  HR: 66 (2021 11:01) (55 - 79)  BP: 126/76 (2021 05:51) (122/71 - 163/77)  BP(mean): --  RR: 18 (2021 05:51) (18 - 20)  SpO2: 98% (2021 11:01) (93% - 100%)    PHYSICAL EXAM:  GENERAL: NAD, well-groomed, well-developed  HEAD:  Atraumatic, Normocephalic  EYES: EOMI, PERRLA, conjunctiva and sclera clear  ENMT: No tonsillar erythema, exudates, or enlargement; Moist mucous membranes, Good dentition, No lesions  NECK: Supple, No JVD, Normal thyroid  NERVOUS SYSTEM:  Alert & Oriented X3, Good concentration; Motor Strength 5/5 B/L upper and lower extremities; DTRs 2+ intact and symmetric  CHEST/LUNG: Clear to percussion bilaterally; No rales, rhonchi, wheezing, or rubs  HEART: Regular rate and rhythm; No murmurs, rubs, or gallops  ABDOMEN: Soft, Nontender, Nondistended; Bowel sounds present  EXTREMITIES:  2+ Peripheral Pulses, No clubbing, cyanosis, or edema  LYMPH: No lymphadenopathy noted  SKIN: No rashes or lesions    LABS:                        8.0    8.27  )-----------( 405      ( 2021 10:42 )             28.3     06-22    139  |  98  |  53<H>  ----------------------------<  116<H>  4.4   |  40<H>  |  1.72<H>    Ca    9.7      2021 06:38    TPro  7.8  /  Alb  x   /  TBili  x   /  DBili  x   /  AST  x   /  ALT  x   /  AlkPhos  x   -        Urinalysis Basic - ( 2021 16:19 )    Color: Yellow / Appearance: Clear / S.015 / pH: x  Gluc: x / Ketone: Negative  / Bili: Negative / Urobili: Negative mg/dL   Blood: x / Protein: Negative mg/dL / Nitrite: Negative   Leuk Esterase: Negative / RBC: 0-2 /HPF / WBC 0-2   Sq Epi: x / Non Sq Epi: Few / Bacteria: Occasional      CAPILLARY BLOOD GLUCOSE                    RADIOLOGY & ADDITIONAL TESTS:    Imaging Personally Reviewed:  [ ] YES  [ ] NO    Consultant(s) Notes Reviewed:  [ ] YES  [ ] NO    Care Discussed with Consultants/Other Providers [ ] YES  [ ] NO    Care discussed with family,         [  ]   yes  [  ]  No    imp:    stable[ x]    unstable[  ]     improving [  x ]       unchanged  [  ]                Plans:  Continue present plans  [x  ] will continue with po lasix  , add cardizem for svt               New consult [  ]   specialty  .......               order test[  ]    test name. labs in am                 Discharge Planning  [  ]           
  INTERVAL HPI:   70 year female with HTN, A fib, Mitral & Tricuspid valve replacement, pHTN, Diastolic CHF, Atrial Fibrillation S/P Ablation, COPD(never smoked but reports 2nd hand exposure from ), Chronic hypoxic hypercarbic Respiratory failure  on 3 litre home O2, Obesity, MIUGEL(did not tolerate CPAP), Gout, Anemia, Renal Insuffiencey  Brought by EMS c/o sob and chest discomfort, more  when she is walking, worse for  4 days ago. Reports PND, uses 3 liter nc at home.   Denies headache, dizziness, blurred visions, light sensitivities, focal/distal weakness or numbness, calfs pain, cough, nausea, vomiting, fever, chills, abd pain,  Stopped taking warfarin 3 weeks ago, was told from her doctor office that she does not need it.  Patient received 40 mg of Lasix iv in ED    OVERNIGHT EVENTS:  Comfortable in bed.    Vital Signs Last 24 Hrs  T(C): 36.5 (2021 16:28), Max: 36.9 (2021 23:58)  T(F): 97.7 (2021 16:28), Max: 98.4 (2021 23:58)  HR: 67 (2021 17:35) (63 - 81)  BP: 118/58 (2021 16:28) (118/58 - 149/61)  BP(mean): 78 (2021 16:28) (78 - 78)  RR: 17 (2021 16:28) (17 - 18)  SpO2: 98% (2021 17:35) (97% - 100%)    PHYSICAL EXAM:  GEN:         Awake, responsive and comfortable.  HEENT:    Normal.    RESP:       no distress  CVS:         Regular rate and rhythm.   ABD:         Soft, non-tender, non-distended;     MEDICATIONS  (STANDING):  ALBUTerol    90 MICROgram(s) HFA Inhaler 1 Puff(s) Inhalation every 4 hours  albuterol/ipratropium for Nebulization 3 milliLiter(s) Nebulizer every 6 hours  allopurinol 100 milliGRAM(s) Oral daily  budesonide 160 MICROgram(s)/formoterol 4.5 MICROgram(s) Inhaler 2 Puff(s) Inhalation two times a day  furosemide   Injectable 40 milliGRAM(s) IV Push daily  pantoprazole    Tablet 40 milliGRAM(s) Oral before breakfast  senna 2 Tablet(s) Oral at bedtime  simvastatin 10 milliGRAM(s) Oral at bedtime  sodium chloride 0.65% Nasal 2 Spray(s) Both Nostrils four times a day  tiotropium 18 MICROgram(s) Capsule 1 Capsule(s) Inhalation daily    MEDICATIONS  (PRN):  magnesium hydroxide Suspension 30 milliLiter(s) Oral daily PRN Constipation    LABS:                        7.3    8.62  )-----------( 476      ( 2021 09:25 )             26.4     06-    138  |  97  |  53<H>  ----------------------------<  128<H>  4.2   |  38<H>  |  1.62<H>    Ca    9.5      2021 07:29    PT/INR - ( 2021 07:29 )   PT: 13.7 sec;   INR: 1.19 ratio      18 @ 13:24  pH: 7.38  pCO2: 66  pO2: 87  SaO2: 96    Urinalysis Basic - ( 2021 16:19 )    Color: Yellow / Appearance: Clear / S.015 / pH: x  Gluc: x / Ketone: Negative  / Bili: Negative / Urobili: Negative mg/dL   Blood: x / Protein: Negative mg/dL / Nitrite: Negative   Leuk Esterase: Negative / RBC: 0-2 /HPF / WBC 0-2   Sq Epi: x / Non Sq Epi: Few / Bacteria: Occasional    ASSESSMENT AND PLAN:  ·	SOB  ·	Acute on chronic diastolic CHF.  ·	Acute COPD exacerbation.  ·	S/P Mitral and Tricuspid Valve Replacement.  ·	Atrial Fibrillation S/P Ablation.  ·	Chronic hypoxic hypercarbic Respiratory failure.  ·	Obesity.  ·	MIGUEL, did not tolerate CPAP.  ·	pHTN.  ·	Renal Insuffiencey.  ·	Anemia.    SPO2 98%.  Clinically better.  Continue O2, Lasix.  Follow renal function.  Continue Symbicort and nebulizer.  Follow H & H.
  INTERVAL HPI:  70 year female with HTN, A fib, Mitral & Tricuspid valve replacement, pHTN, Diastolic CHF, Atrial Fibrillation S/P Ablation, COPD(never smoked but reports 2nd hand exposure from ), Chronic hypoxic hypercarbic Respiratory failure  on 3 litre home O2, Obesity, MIGUEL(did not tolerate CPAP), Gout, Anemia, Renal Insuffiencey  Brought by EMS c/o sob and chest discomfort, more  when she is walking, worse for  4 days ago. Reports PND, uses 3 liter nc at home.   Denies headache, dizziness, blurred visions, light sensitivities, focal/distal weakness or numbness, calfs pain, cough, nausea, vomiting, fever, chills, abd pain,  Stopped taking warfarin 3 weeks ago, was told from her doctor office that she does not need it.  Patient received 40 mg of Lasix iv in ED    OVERNIGHT EVENTS:  Feels better.    Vital Signs Last 24 Hrs  T(C): 36.7 (19 Jun 2021 12:08), Max: 36.7 (19 Jun 2021 05:20)  T(F): 98 (19 Jun 2021 12:08), Max: 98.1 (19 Jun 2021 05:20)  HR: 94 (19 Jun 2021 12:08) (63 - 94)  BP: 146/77 (19 Jun 2021 12:08) (102/50 - 160/69)  BP(mean): --  RR: 18 (19 Jun 2021 12:08) (18 - 18)  SpO2: 98% (19 Jun 2021 12:08) (96% - 100%)    PHYSICAL EXAM:  GEN:         Awake, responsive and comfortable.  HEENT:    Normal.    RESP:       no distress  CVS:           Regular rate and rhythm.   ABD:         Soft, non-tender, non-distended;     MEDICATIONS  (STANDING):  ALBUTerol    90 MICROgram(s) HFA Inhaler 1 Puff(s) Inhalation every 4 hours  albuterol/ipratropium for Nebulization 3 milliLiter(s) Nebulizer every 6 hours  allopurinol 100 milliGRAM(s) Oral daily  budesonide 160 MICROgram(s)/formoterol 4.5 MICROgram(s) Inhaler 2 Puff(s) Inhalation two times a day  furosemide   Injectable 40 milliGRAM(s) IV Push daily  pantoprazole    Tablet 40 milliGRAM(s) Oral before breakfast  senna 2 Tablet(s) Oral at bedtime  simvastatin 10 milliGRAM(s) Oral at bedtime  sodium chloride 0.65% Nasal 2 Spray(s) Both Nostrils four times a day  tiotropium 18 MICROgram(s) Capsule 1 Capsule(s) Inhalation daily    MEDICATIONS  (PRN):  magnesium hydroxide Suspension 30 milliLiter(s) Oral daily PRN Constipation    LABS:                        7.0    8.21  )-----------( 481      ( 18 Jun 2021 15:55 )             25.7     06-18    138  |  96  |  53<H>  ----------------------------<  97  4.4   |  39<H>  |  1.54<H>    Ca    9.5      18 Jun 2021 15:55  Mg     2.3     06-18    TPro  9.0<H>  /  Alb  3.4  /  TBili  0.3  /  DBili  x   /  AST  23  /  ALT  19  /  AlkPhos  112  06-18    PT/INR - ( 18 Jun 2021 12:20 )   PT: 13.5 sec;   INR: 1.17 ratio      PTT - ( 18 Jun 2021 12:20 )  PTT:38.5 sec  06-18 @ 13:24  pH: 7.38  pCO2: 66  pO2: 87  SaO2: 96  ASSESSMENT AND PLAN:  ·	SOB  ·	Acute on chronic diastolic CHF.  ·	Acute COPD exacerbation.  ·	S/P Mitral and Tricuspid Valve Replacement.  ·	Atrial Fibrillation S/P Ablation.  ·	Chronic hypoxic hypercarbic Respiratory failure.  ·	Obesity.  ·	MIGUEL, did not tolerate CPAP.  ·	pHTN.  ·	Renal Insuffiencey.  ·	Anemia.    Clinically improving.  Continue O2, Lasix.  Follow renal function.  Continue Symbicort and nebulizer.  Follow H & H.  
  INTERVAL HPI:  70 year female with HTN, A fib, Mitral & Tricuspid valve replacement, pHTN, Diastolic CHF, Atrial Fibrillation S/P Ablation, COPD(never smoked but reports 2nd hand exposure from ), Chronic hypoxic hypercarbic Respiratory failure  on 3 litre home O2, Obesity, MIGUEL(did not tolerate CPAP), Gout, Anemia, Renal Insuffiencey  Brought by EMS c/o sob and chest discomfort, more  when she is walking, worse for  4 days ago. Reports PND, uses 3 liter nc at home.   Denies headache, dizziness, blurred visions, light sensitivities, focal/distal weakness or numbness, calfs pain, cough, nausea, vomiting, fever, chills, abd pain,  Stopped taking warfarin 3 weeks ago, was told from her doctor office that she does not need it.  Patient received 40 mg of Lasix iv in ED.  21:  Bone marrow biopsy    OVERNIGHT EVENTS:  Awake, comfortable.    Vital Signs Last 24 Hrs  T(C): 36.6 (2021 11:06), Max: 36.9 (2021 14:35)  T(F): 97.9 (2021 11:06), Max: 98.4 (2021 14:35)  HR: 73 (2021 13:42) (55 - 79)  BP: 106/55 (2021 13:42) (106/55 - 163/77)  BP(mean): --  RR: 16 (2021 11:06) (16 - 18)  SpO2: 96% (2021 11:06) (93% - 100%)    PHYSICAL EXAM:  GEN:         Awake, responsive and comfortable.  HEENT:    Normal.    RESP:       no distress  CVS:          Regular rate and rhythm.   ABD:         Soft, non-tender, non-distended;     MEDICATIONS  (STANDING):  ALBUTerol    90 MICROgram(s) HFA Inhaler 1 Puff(s) Inhalation every 4 hours  albuterol/ipratropium for Nebulization 3 milliLiter(s) Nebulizer every 6 hours  allopurinol 100 milliGRAM(s) Oral daily  budesonide 160 MICROgram(s)/formoterol 4.5 MICROgram(s) Inhaler 2 Puff(s) Inhalation two times a day  diltiazem    Tablet 30 milliGRAM(s) Oral every 8 hours  furosemide    Tablet 20 milliGRAM(s) Oral daily  pantoprazole    Tablet 40 milliGRAM(s) Oral before breakfast  senna 2 Tablet(s) Oral at bedtime  simvastatin 10 milliGRAM(s) Oral at bedtime  sodium chloride 0.65% Nasal 2 Spray(s) Both Nostrils four times a day  tiotropium 18 MICROgram(s) Capsule 1 Capsule(s) Inhalation daily    MEDICATIONS  (PRN):  magnesium hydroxide Suspension 30 milliLiter(s) Oral daily PRN Constipation    LABS:                        8.0    8.27  )-----------( 405      ( 2021 10:42 )             28.3         139  |  98  |  53<H>  ----------------------------<  116<H>  4.4   |  40<H>  |  1.72<H>    Ca    9.7      2021 06:38    TPro  7.8  /  Alb  x   /  TBili  x   /  DBili  x   /  AST  x   /  ALT  x   /  AlkPhos  x   18 @ 13:24  pH: 7.38  pCO2: 66  pO2: 87  SaO2: 96    Urinalysis Basic - ( 2021 16:19 )    Color: Yellow / Appearance: Clear / S.015 / pH: x  Gluc: x / Ketone: Negative  / Bili: Negative / Urobili: Negative mg/dL   Blood: x / Protein: Negative mg/dL / Nitrite: Negative   Leuk Esterase: Negative / RBC: 0-2 /HPF / WBC 0-2   Sq Epi: x / Non Sq Epi: Few / Bacteria: Occasional      ASSESSMENT AND PLAN:  ·	SOB  ·	Acute on chronic diastolic CHF.  ·	Acute COPD exacerbation.  ·	S/P Mitral and Tricuspid Valve Replacement.  ·	Atrial Fibrillation S/P Ablation.  ·	Chronic hypoxic hypercarbic Respiratory failure.  ·	Obesity.  ·	MIGUEL, did not tolerate CPAP.  ·	pHTN.  ·	Renal Insuffiencey.  ·	Anemia.    Clinically better.  Continue O2, Lasix.  Follow renal function.  Continue Symbicort and nebulizer.  Follow bone marrow biopsy.
lying in bed    Vital Signs Last 24 Hrs  T(C): 36.6 (24 Jun 2021 04:59), Max: 36.8 (24 Jun 2021 00:02)  T(F): 97.9 (24 Jun 2021 04:59), Max: 98.3 (24 Jun 2021 00:02)  HR: 66 (24 Jun 2021 05:42) (66 - 84)  BP: 137/74 (24 Jun 2021 04:59) (123/73 - 163/75)  BP(mean): 105 (23 Jun 2021 16:53) (105 - 105)  RR: 18 (24 Jun 2021 04:59) (18 - 18)  SpO2: 97% (24 Jun 2021 05:42) (95% - 99%)    PHYSICAL EXAM:    general - AAO x 3  HEENT - No Icterus  CVS - RRR  RS - AE B/L  Abd - soft, NT  Ext - Pulses +        LABS:                        8.0    8.27  )-----------( 405      ( 22 Jun 2021 10:42 )             28.3           
  INTERVAL HPI:  70 year female with HTN, A fib, Mitral & Tricuspid valve replacement, pHTN, Diastolic CHF, Atrial Fibrillation S/P Ablation, COPD(never smoked but reports 2nd hand exposure from ), Chronic hypoxic hypercarbic Respiratory failure  on 3 litre home O2, Obesity, MIGUEL(did not tolerate CPAP), Gout, Anemia, Renal Insuffiencey  Brought by EMS c/o sob and chest discomfort, more  when she is walking, worse for  4 days ago. Reports PND, uses 3 liter nc at home.   Denies headache, dizziness, blurred visions, light sensitivities, focal/distal weakness or numbness, calfs pain, cough, nausea, vomiting, fever, chills, abd pain,  Stopped taking warfarin 3 weeks ago, was told from her doctor office that she does not need it.  Patient received 40 mg of Lasix iv in ED.  21:  Bone marrow biopsy    OVERNIGHT EVENTS:  Feels better.    Vital Signs Last 24 Hrs  T(C): 36.6 (2021 18:00), Max: 37.1 (2021 00:50)  T(F): 97.8 (2021 18:00), Max: 98.8 (2021 00:50)  HR: 79 (2021 18:00) (55 - 79)  BP: 122/71 (2021 18:00) (111/67 - 163/77)  BP(mean): --  RR: 18 (2021 18:00) (17 - 20)  SpO2: 94% (2021 18:00) (94% - 100%)    PHYSICAL EXAM:  GEN:         Awake, responsive and comfortable.  HEENT:    Normal.    RESP:        no wheezing  CVS:          Regular rate and rhythm.   ABD:         Soft, non-tender, non-distended;     MEDICATIONS  (STANDING):  ALBUTerol    90 MICROgram(s) HFA Inhaler 1 Puff(s) Inhalation every 4 hours  albuterol/ipratropium for Nebulization 3 milliLiter(s) Nebulizer every 6 hours  allopurinol 100 milliGRAM(s) Oral daily  budesonide 160 MICROgram(s)/formoterol 4.5 MICROgram(s) Inhaler 2 Puff(s) Inhalation two times a day  pantoprazole    Tablet 40 milliGRAM(s) Oral before breakfast  senna 2 Tablet(s) Oral at bedtime  simvastatin 10 milliGRAM(s) Oral at bedtime  sodium chloride 0.65% Nasal 2 Spray(s) Both Nostrils four times a day  tiotropium 18 MICROgram(s) Capsule 1 Capsule(s) Inhalation daily    MEDICATIONS  (PRN):  magnesium hydroxide Suspension 30 milliLiter(s) Oral daily PRN Constipation    LABS:                        7.4    7.31  )-----------( 490      ( 2021 09:02 )             27.0     06-    137  |  96  |  55<H>  ----------------------------<  93  4.4   |  37<H>  |  1.58<H>    Ca    9.6      2021 07:35    TPro  7.8  /  Alb  x   /  TBili  x   /  DBili  x   /  AST  x   /  ALT  x   /  AlkPhos  x       PT/INR - ( 2021 07:29 )   PT: 13.7 sec;   INR: 1.19 ratio       @ 13:24  pH: 7.38  pCO2: 66  pO2: 87  SaO2: 96    Urinalysis Basic - ( 2021 16:19 )    Color: Yellow / Appearance: Clear / S.015 / pH: x  Gluc: x / Ketone: Negative  / Bili: Negative / Urobili: Negative mg/dL   Blood: x / Protein: Negative mg/dL / Nitrite: Negative   Leuk Esterase: Negative / RBC: 0-2 /HPF / WBC 0-2   Sq Epi: x / Non Sq Epi: Few / Bacteria: Occasional    ASSESSMENT AND PLAN:  ·	SOB  ·	Acute on chronic diastolic CHF.  ·	Acute COPD exacerbation.  ·	S/P Mitral and Tricuspid Valve Replacement.  ·	Atrial Fibrillation S/P Ablation.  ·	Chronic hypoxic hypercarbic Respiratory failure.  ·	Obesity.  ·	MIGUEL, did not tolerate CPAP.  ·	pHTN.  ·	Renal Insuffiencey.  ·	Anemia.    Continue Symbicort and nebulizer.  Follow bone marrow biopsy.  Clinically better.  Continue O2, Lasix.  Follow renal function.
Patient is a 70y old  Female who presents with a chief complaint of dyspnea on exertion/ Pul edema/ COPD/ HFwPEF/ MVR/TVR/ anemia / FOB positive (2021 09:35) improving, but still has orhtopnea   anemic.   occult blood  negative in feces   Hematology/ oncology notes reviewed- Anemia work up in progress.   Patient in sinus rhythm  Vitals stable      Patient aware of left renal mass,  followed as Op.      INTERVAL HPI/OVERNIGHT EVENTS:  PAST MEDICAL & SURGICAL HISTORY:  Atrial fibrillation  S/P Ablation    Pulmonary hypertension    MIGUEL (obstructive sleep apnea)    COPD (chronic obstructive pulmonary disease)  on home O2    CHF (congestive heart failure)    HTN (hypertension)    Gout    Mitral valve replaced    Tricuspid valve replaced    CHF (congestive heart failure)    Hypertension    COPD (chronic obstructive pulmonary disease)    History of mitral valve replacement with mechanical valve    Tricuspid valve replaced  mechanical    No significant past surgical history        MEDICATIONS  (STANDING):  ALBUTerol    90 MICROgram(s) HFA Inhaler 1 Puff(s) Inhalation every 4 hours  albuterol/ipratropium for Nebulization 3 milliLiter(s) Nebulizer every 6 hours  allopurinol 100 milliGRAM(s) Oral daily  budesonide 160 MICROgram(s)/formoterol 4.5 MICROgram(s) Inhaler 2 Puff(s) Inhalation two times a day  pantoprazole    Tablet 40 milliGRAM(s) Oral before breakfast  senna 2 Tablet(s) Oral at bedtime  simvastatin 10 milliGRAM(s) Oral at bedtime  sodium chloride 0.65% Nasal 2 Spray(s) Both Nostrils four times a day  tiotropium 18 MICROgram(s) Capsule 1 Capsule(s) Inhalation daily    MEDICATIONS  (PRN):  magnesium hydroxide Suspension 30 milliLiter(s) Oral daily PRN Constipation      Allergies    No Known Allergies    Intolerances        REVIEW OF SYSTEMS:  CONSTITUTIONAL: No fever, weight loss, or fatigue  EYES: No eye pain, visual disturbances, or discharge  ENMT:  No difficulty hearing, tinnitus, vertigo; No sinus or throat pain  NECK: No pain or stiffness  BREASTS: No pain, masses, or nipple discharge  RESPIRATORY: No cough, wheezing, chills or hemoptysis; No shortness of breath  CARDIOVASCULAR: No chest pain, palpitations, dizziness, or leg swelling  GASTROINTESTINAL: No abdominal or epigastric pain. No nausea, vomiting, or hematemesis; No diarrhea or constipation. No melena or hematochezia.  GENITOURINARY: No dysuria, frequency, hematuria, or incontinence  NEUROLOGICAL: No headaches, memory loss, loss of strength, numbness, or tremors  SKIN: No itching, burning, rashes, or lesions   LYMPH NODES: No enlarged glands  ENDOCRINE: No heat or cold intolerance; No hair loss  MUSCULOSKELETAL: No joint pain or swelling; No muscle, back, or extremity pain  PSYCHIATRIC: No depression, anxiety, mood swings, or difficulty sleeping  HEME/LYMPH: No easy bruising, or bleeding gums  ALLERY AND IMMUNOLOGIC: No hives or eczema    Vital Signs Last 24 Hrs  T(C): 37 (2021 05:23), Max: 37.1 (2021 00:50)  T(F): 98.6 (2021 05:23), Max: 98.8 (2021 00:50)  HR: 67 (2021 05:23) (63 - 79)  BP: 122/84 (2021 05:23) (118/58 - 128/67)  BP(mean): 78 (2021 16:28) (78 - 78)  RR: 18 (2021 05:23) (17 - 18)  SpO2: 97% (2021 05:23) (97% - 100%)    PHYSICAL EXAM:  GENERAL: NAD, well-groomed, well-developed  HEAD:  Atraumatic, Normocephalic  EYES: EOMI, PERRLA, conjunctiva and sclera clear  ENMT: No tonsillar erythema, exudates, or enlargement; Moist mucous membranes, Good dentition, No lesions  NECK: Supple, No JVD, Normal thyroid  NERVOUS SYSTEM:  Alert & Oriented X3, Good concentration; Motor Strength 5/5 B/L upper and lower extremities; DTRs 2+ intact and symmetric  CHEST/LUNG: Clear to percussion bilaterally; No rales, rhonchi, wheezing, or rubs  HEART: Regular rate and rhythm; No murmurs, rubs, or gallops  ABDOMEN: Soft, Nontender, Nondistended; Bowel sounds present  EXTREMITIES:  2+ Peripheral Pulses, No clubbing, cyanosis, or edema  LYMPH: No lymphadenopathy noted  SKIN: No rashes or lesions    LABS:                        7.4    7.31  )-----------( 490      ( 2021 09:02 )             27.0     06-21    137  |  96  |  55<H>  ----------------------------<  93  4.4   |  37<H>  |  1.58<H>    Ca    9.6      2021 07:35    TPro  8.6<H>  /  Alb  3.2<L>  /  TBili  0.4  /  DBili  .17  /  AST  22  /  ALT  16  /  AlkPhos  92        PT/INR - ( 2021 07:29 )   PT: 13.7 sec;   INR: 1.19 ratio           Urinalysis Basic - ( 2021 16:19 )    Color: Yellow / Appearance: Clear / S.015 / pH: x  Gluc: x / Ketone: Negative  / Bili: Negative / Urobili: Negative mg/dL   Blood: x / Protein: Negative mg/dL / Nitrite: Negative   Leuk Esterase: Negative / RBC: 0-2 /HPF / WBC 0-2   Sq Epi: x / Non Sq Epi: Few / Bacteria: Occasional      CAPILLARY BLOOD GLUCOSE                    RADIOLOGY & ADDITIONAL TESTS:    Imaging Personally Reviewed:  [ ] YES  [ ] NO    Consultant(s) Notes Reviewed:  [ ] YES  [ ] NO    Care Discussed with Consultants/Other Providers [ ] YES  [ ] NO    Care discussed with family,         [  ]   yes  [  ]  No    imp:    stable[ ]    unstable[  ]     improving [ x  ]       unchanged  [  ]                Plans:  Continue present plans  [ x ] 1 unit of PRBC today. . getting IV lasix today also               New consult [  ]   specialty  .......               order test[  ]    test name.  labs in am                Discharge Planning  [  ]           
Patient is a 70y old  Female who presents with a chief complaint of dyspnea on exertion/ Pul edema/ COPD/ HFwPEF/ MVR/TVR/ anemia / FOB positive (23 Jun 2021 09:23)  70 year old female admitted with severe dyspnea and hypercarbic/ hypoxic   resp failure with an el;ement of  HFwPEF. On Home o2.   severe anemia - transfused with 2 ulits of PRBC    copd/ severe PUL HTN  s/p TVR and MV Mecahnical MVR- patient   becomes anemic with ACt and act was stopped  3 weeks ago .   patient was seen by GI on last admission and because of Multiple comorbiditis - not a candidate for colonoscopy.   has iron deficiency   Now in sinus rhythm with PVCs, strted on Cardizem for  runs of SVT,   no svt in last 24 hours,    has left renal mass, patient aware- for Op work up   Hematology notes and orders  noted.   [facility] notes   reviewed    INTERVAL HPI/OVERNIGHT EVENTS:  PAST MEDICAL & SURGICAL HISTORY:  Atrial fibrillation  S/P Ablation    Pulmonary hypertension    MIGUEL (obstructive sleep apnea)    COPD (chronic obstructive pulmonary disease)  on home O2    CHF (congestive heart failure)    HTN (hypertension)    Gout    Mitral valve replaced    Tricuspid valve replaced    CHF (congestive heart failure)    Hypertension    COPD (chronic obstructive pulmonary disease)    History of mitral valve replacement with mechanical valve    Tricuspid valve replaced  mechanical    No significant past surgical history        MEDICATIONS  (STANDING):  ALBUTerol    90 MICROgram(s) HFA Inhaler 1 Puff(s) Inhalation every 4 hours  albuterol/ipratropium for Nebulization 3 milliLiter(s) Nebulizer every 6 hours  allopurinol 100 milliGRAM(s) Oral daily  budesonide 160 MICROgram(s)/formoterol 4.5 MICROgram(s) Inhaler 2 Puff(s) Inhalation two times a day  diltiazem    Tablet 30 milliGRAM(s) Oral every 8 hours  folic acid 1 milliGRAM(s) Oral daily  furosemide    Tablet 20 milliGRAM(s) Oral daily  iron sucrose IVPB 200 milliGRAM(s) IV Intermittent once  pantoprazole    Tablet 40 milliGRAM(s) Oral before breakfast  senna 2 Tablet(s) Oral at bedtime  simvastatin 10 milliGRAM(s) Oral at bedtime  sodium chloride 0.65% Nasal 2 Spray(s) Both Nostrils four times a day  tiotropium 18 MICROgram(s) Capsule 1 Capsule(s) Inhalation daily    MEDICATIONS  (PRN):  acetaminophen   Tablet .. 650 milliGRAM(s) Oral every 6 hours PRN Temp greater or equal to 38C (100.4F), Moderate Pain (4 - 6)  magnesium hydroxide Suspension 30 milliLiter(s) Oral daily PRN Constipation      Allergies    No Known Allergies    Intolerances        REVIEW OF SYSTEMS:  CONSTITUTIONAL: No fever, weight loss, or fatigue  EYES: No eye pain, visual disturbances, or discharge  ENMT:  No difficulty hearing, tinnitus, vertigo; No sinus or throat pain  NECK: No pain or stiffness  BREASTS: No pain, masses, or nipple discharge  RESPIRATORY: No cough, wheezing, chills or hemoptysis; No shortness of breath  CARDIOVASCULAR: No chest pain, palpitations, dizziness, or leg swelling  GASTROINTESTINAL: No abdominal or epigastric pain. No nausea, vomiting, or hematemesis; No diarrhea or constipation. No melena or hematochezia.  GENITOURINARY: No dysuria, frequency, hematuria, or incontinence  NEUROLOGICAL: No headaches, memory loss, loss of strength, numbness, or tremors  SKIN: No itching, burning, rashes, or lesions   LYMPH NODES: No enlarged glands  ENDOCRINE: No heat or cold intolerance; No hair loss  MUSCULOSKELETAL: No joint pain or swelling; No muscle, back, or extremity pain  PSYCHIATRIC: No depression, anxiety, mood swings, or difficulty sleeping  HEME/LYMPH: No easy bruising, or bleeding gums  ALLERY AND IMMUNOLOGIC: No hives or eczema    Vital Signs Last 24 Hrs  T(C): 36.8 (23 Jun 2021 05:33), Max: 36.8 (23 Jun 2021 05:33)  T(F): 98.3 (23 Jun 2021 05:33), Max: 98.3 (23 Jun 2021 05:33)  HR: 63 (23 Jun 2021 07:00) (63 - 80)  BP: 124/73 (23 Jun 2021 05:33) (106/55 - 151/64)  BP(mean): --  RR: 18 (23 Jun 2021 05:33) (16 - 20)  SpO2: 98% (23 Jun 2021 06:04) (90% - 99%)    PHYSICAL EXAM:  GENERAL: NAD, well-groomed, well-developed  HEAD:  Atraumatic, Normocephalic  EYES: EOMI, PERRLA, conjunctiva and sclera clear  ENMT: No tonsillar erythema, exudates, or enlargement; Moist mucous membranes, Good dentition, No lesions  NECK: Supple, No JVD, Normal thyroid  NERVOUS SYSTEM:  Alert & Oriented X3, Good concentration; Motor Strength 5/5 B/L upper and lower extremities; DTRs 2+ intact and symmetric  CHEST/LUNG: Clear to percussion bilaterally; No rales, rhonchi, wheezing, or rubs  HEART: Regular rate and rhythm; No murmurs, rubs, or gallops  ABDOMEN: Soft, Nontender, Nondistended; Bowel sounds present  EXTREMITIES:  2+ Peripheral Pulses, No clubbing, cyanosis, or edema  LYMPH: No lymphadenopathy noted  SKIN: No rashes or lesions    LABS:                        8.0    8.27  )-----------( 405      ( 22 Jun 2021 10:42 )             28.3     06-22    139  |  98  |  53<H>  ----------------------------<  116<H>  4.4   |  40<H>  |  1.72<H>    Ca    9.7      22 Jun 2021 06:38            CAPILLARY BLOOD GLUCOSE                    RADIOLOGY & ADDITIONAL TESTS:    Imaging Personally Reviewed:  [ ] YES  [ ] NO    Consultant(s) Notes Reviewed:  [ ] YES  [ ] NO    Care Discussed with Consultants/Other Providers [ ] YES  [ ] NO    Care discussed with family,         [  ]   yes  [  ]  No    imp:    stable[ ]    unstable[  ]     improving [   ]       unchanged  [  ]                Plans:  Continue present plans  [  ]               New consult [  ]   specialty  .......               order test[  ]    test name.                  Discharge Planning  [  ]           
lying in bed    Vital Signs Last 24 Hrs  T(C): 37 (2021 05:23), Max: 37.1 (2021 00:50)  T(F): 98.6 (2021 05:23), Max: 98.8 (2021 00:50)  HR: 67 (2021 05:23) (63 - 79)  BP: 122/84 (2021 05:23) (118/58 - 128/67)  BP(mean): 78 (2021 16:28) (78 - 78)  RR: 18 (2021 05:23) (17 - 18)  SpO2: 97% (2021 05:23) (97% - 100%)    PHYSICAL EXAM:    general - AAO x 3  HEENT - No Icterus  CVS - RRR  RS - AE B/L  Abd - soft, NT  Ext - Pulses +        LABS:                        7.4    7.31  )-----------( 490      ( 2021 09:02 )             27.0     06-21    137  |  96  |  55<H>  ----------------------------<  93  4.4   |  37<H>  |  1.58<H>    Ca    9.6      2021 07:35    TPro  8.6<H>  /  Alb  3.2<L>  /  TBili  0.4  /  DBili  .17  /  AST  22  /  ALT  16  /  AlkPhos  92  06-21    PT/INR - ( 2021 07:29 )   PT: 13.7 sec;   INR: 1.19 ratio           Urinalysis Basic - ( 2021 16:19 )    Color: Yellow / Appearance: Clear / S.015 / pH: x  Gluc: x / Ketone: Negative  / Bili: Negative / Urobili: Negative mg/dL   Blood: x / Protein: Negative mg/dL / Nitrite: Negative   Leuk Esterase: Negative / RBC: 0-2 /HPF / WBC 0-2   Sq Epi: x / Non Sq Epi: Few / Bacteria: Occasional          RADIOLOGY & ADDITIONAL STUDIES:  
lying in bed    Vital Signs Last 24 Hrs  T(C): 36.8 (23 Jun 2021 05:33), Max: 36.8 (23 Jun 2021 05:33)  T(F): 98.3 (23 Jun 2021 05:33), Max: 98.3 (23 Jun 2021 05:33)  HR: 63 (23 Jun 2021 07:00) (63 - 80)  BP: 124/73 (23 Jun 2021 05:33) (106/55 - 151/64)  BP(mean): --  RR: 18 (23 Jun 2021 05:33) (16 - 20)  SpO2: 98% (23 Jun 2021 06:04) (90% - 99%)    PHYSICAL EXAM:    general - AAO x 3, on oxygen  HEENT - No Icterus  CVS - RRR  RS - AE B/L  Abd - soft, NT  Ext - Pulses +        LABS:                        8.0    8.27  )-----------( 405      ( 22 Jun 2021 10:42 )             28.3     06-22    139  |  98  |  53<H>  ----------------------------<  116<H>  4.4   |  40<H>  |  1.72<H>    Ca    9.7      22 Jun 2021 06:38    TPro  7.8  /  Alb  x   /  TBili  x   /  DBili  x   /  AST  x   /  ALT  x   /  AlkPhos  x   06-21          
lying in bed    Vital Signs Last 24 Hrs  T(C): 36.4 (2021 05:51), Max: 37.1 (2021 10:20)  T(F): 97.6 (2021 05:51), Max: 98.7 (2021 10:20)  HR: 77 (2021 05:51) (55 - 79)  BP: 126/76 (2021 05:51) (111/67 - 163/77)  BP(mean): --  RR: 18 (2021 05:51) (17 - 20)  SpO2: 93% (2021 05:51) (93% - 100%)    PHYSICAL EXAM:    general - AAO x 3  HEENT - No Icterus  CVS - RRR  RS - AE B/L  Abd - soft, NT  Ext - Pulses +        LABS:                        7.4    7.31  )-----------( 490      ( 2021 09:02 )             27.0     06-22    139  |  98  |  53<H>  ----------------------------<  116<H>  4.4   |  40<H>  |  1.72<H>    Ca    9.7      2021 06:38    TPro  7.8  /  Alb  x   /  TBili  x   /  DBili  x   /  AST  x   /  ALT  x   /  AlkPhos  x   06-21      Urinalysis Basic - ( 2021 16:19 )    Color: Yellow / Appearance: Clear / S.015 / pH: x  Gluc: x / Ketone: Negative  / Bili: Negative / Urobili: Negative mg/dL   Blood: x / Protein: Negative mg/dL / Nitrite: Negative   Leuk Esterase: Negative / RBC: 0-2 /HPF / WBC 0-2   Sq Epi: x / Non Sq Epi: Few / Bacteria: Occasional          RADIOLOGY & ADDITIONAL STUDIES:

## 2021-06-24 NOTE — DISCHARGE NOTE PROVIDER - CARE PROVIDER_API CALL
Storm Elizabeth)  Medicine  [address] [facility], [address]  [place] [address]  Phone: [phone]  Fax: [phone]  Follow Up Time:     Juan Judd  HEMATOLOGY  [address] [facility], [address] [place] [address]  Phone: [phone]  Fax: [phone]  Follow Up Time:     Dr JONES Achenboboie  Phone: (   )    -  Fax: (   )    -  Follow Up Time:

## 2021-06-24 NOTE — PROGRESS NOTE ADULT - PROBLEM SELECTOR PLAN 1
- IV iron  - f/u BM biopsy results  - patient given contact information and asked to f/u as outpatient  - D/w patient needs GI work-up

## 2021-06-24 NOTE — DISCHARGE NOTE PROVIDER - NSDCMRMEDTOKEN_GEN_ALL_CORE_FT
Albuterol (Eqv-ProAir HFA) 90 mcg/inh inhalation aerosol: 2 puff(s) inhaled every 6 hours  allopurinol 100 mg oral tablet: 1 tab(s) orally once a day  budesonide-formoterol 160 mcg-4.5 mcg/inh inhalation aerosol: 2  inhaled 2 times a day  dilTIAZem 30 mg oral tablet: 1 tab(s) orally every 8 hours  ferrous sulfate 324 mg (65 mg elemental iron) oral delayed release tablet: 1 tab(s) orally once a day   folic acid 1 mg oral tablet: 1 tab(s) orally once a day  furosemide 20 mg oral tablet: 1 tab(s) orally once a day  pantoprazole 40 mg oral delayed release tablet: 1 tab(s) orally once a day (before a meal)  senna oral tablet: 2 tab(s) orally once a day (at bedtime)  simvastatin 10 mg oral tablet: 1 tab(s) orally once a day (at bedtime)  sodium chloride 0.65% nasal spray: 2 spray(s) nasal 4 times a day

## 2021-06-24 NOTE — DISCHARGE NOTE PROVIDER - PROVIDER TOKENS
PROVIDER:[TOKEN:[[id number]]],PROVIDER:[TOKEN:[[id number]]],FREE:[LAST:[PMD],PHONE:[(   )    -],FAX:[(   )    -],ADDRESS:[Ilana Mo]]

## 2021-06-24 NOTE — PROGRESS NOTE ADULT - ASSESSMENT
Anemia
Anemia
SOB  Acute on chronic diastolic CHF.  Acute COPD exacerbation.  S/P Mitral and Tricuspid Valve Replacement.  Atrial Fibrillation S/P Ablation.  Chronic hypoxic hypercarbic Respiratory failure.  Obesity.  MIGUEL, did not tolerate CPAP.  pHTN.  short runs of  non sustained SVt
acute on chronic   hypoxic and hypercarbic  respiratory failure.  copd  a. fib- patient has hx of bradycardia on b blockers- so is not a candidate for b b blockers  HFwPEF.   ckd   HTn
Anemia
  Acute on chronic diastolic CHF.  Acute COPD exacerbation.  S/P Mitral and Tricuspid Valve Replacement.  Atrial Fibrillation S/P Ablation.  Chronic hypoxic hypercarbic Respiratory failure.  Obesity.  MIGUEL, did not tolerate CPAP.  pHTN.  Renal Insuffiencey.  Anemia. to get  1 unit of PRBC today.   anemia work up in progress ? BM Biopsy  Left renal mass  
SOB  Acute on chronic diastolic CHF.  Acute COPD exacerbation.  S/P Mitral and Tricuspid Valve Replacement.  Atrial Fibrillation S/P Ablation.- Now in sinus rhtythm  Chronic hypoxic hypercarbic Respiratory failure.  Obesity.  MIGUEL, did not tolerate CPAP.  pHTN.- Severe  Renal Insuffiencey./ Renal mass  Anemia.     patient's coumadin was stopped due to her anemia by her PMD.  
70 year old female admitted with severe dyspnea and hypercarbic/ hypoxic   resp failure with an el;ement of  HFwPEF. On Home o2.   severe anemia - transfused with 2 ulits of PRBC    copd/ severe PUL HTN  s/p TVR and MV Mecahnical MVR- patient   becomes anemic with ACt and act was stopped  3 weeks ago .   patient was seen by GI on last admission and because of Multiple comorbiditis - not a candidate for colonoscopy.   has iron deficiency   Now in sinus rhythm with PVCs, strted on Cardizem for  runs of SVT,   no svt in last 24 hours,    has left renal mass, patient aware- for Op work up   severe obesity and possibly MIGUEL  
Anemia

## 2021-06-24 NOTE — DISCHARGE NOTE PROVIDER - DETAILS OF MALNUTRITION DIAGNOSIS/DIAGNOSES
This patient has been assessed with a concern for Malnutrition and was treated during this hospitalization for the following Nutrition diagnosis/diagnoses:     -  06/20/2021: Morbid obesity (BMI > 40)

## 2021-06-24 NOTE — PROGRESS NOTE ADULT - REASON FOR ADMISSION
dyspnea on exertion/ Pul edema/ COPD/ HFwPEF/ MVR/TVR/ anemia / FOB positive

## 2021-06-24 NOTE — DISCHARGE NOTE PROVIDER - HOSPITAL COURSE
70 year female with HTN, A fib, Mitral & Tricuspid valve replacement, pHTN, Diastolic CHF, Atrial Fibrillation S/P Ablation, COPD(never smoked but reports 2nd hand exposure from ), Chronic hypoxic hypercarbic Respiratory failure  on 3 litre home O2, Obesity, MIGUEL(did not tolerate CPAP), Gout, Anemia, Renal Insuffiencey  Brought by EMS c/o sob and chest discomfort, more  when she is walking, worse for  4 days ago. Reports PND, uses 3 liter nc at home.   Denies headache, dizziness, blurred visions, light sensitivities, focal/distal weakness or numbness, calfs pain, cough, nausea, vomiting, fever, chills, abd pain,  Stopped taking warfarin 3 weeks ago, was told from her doctor office that she does not need it.  Patient received 40 mg of Lasix iv in ED.  06/21/21:  Bone marrow biopsy    SEEN BY PULMONARY  SOB  Acute on chronic diastolic CHF.  Acute COPD exacerbation.  S/P Mitral and Tricuspid Valve Replacement.  Atrial Fibrillation S/P Ablation.  Chronic hypoxic hypercarbic Respiratory failure.  Obesity.  MIGUEL, did not tolerate CPAP.  pHTN.  Renal Insuffiencey.  Anemia.  Breathing better.  Continue O2, diuretics and nebulizer.  Awaiting bone marrow biopsy results.  Storm Elizabeth (MD)  -2021 17:19)    SEEN BY HEMATOLOGY/ ONCOLOGY  Anemia   1:·  Problem: Anemia.  Plan: - IV iron  - f/u BM biopsy results  - patient given contact information and asked to f/u as outpatient  - D/w patient needs GI work-up.   Juan Judd (ALEX)     70 year old female admitted with severe dyspnea and hypercarbic/ hypoxic   resp failure with an element of  HFwPEF. On Home o2.   severe anemia - transfused with 2 units of PRBC    copd/ severe PUL HTN  s/p TVR and MV Mechanical MVR- patient   becomes anemic with ACt and act was stopped  3 weeks ago .   patient was seen by GI on last admission and because of Multiple comorbidities - not a candidate for colonoscopy.   has iron deficiency   Now in sinus rhythm with PVCs, started on Cardizem for  runs of SVT,   no svt in last 24 hours,    has left renal mass, patient aware- for Op work up   Hematology notes and orders  noted.   Pulmonary notes   reviewed,  Stable for discharge home.

## 2021-06-24 NOTE — PROGRESS NOTE ADULT - PROVIDER SPECIALTY LIST ADULT
Heme/Onc
Pulmonology
Heme/Onc
Internal Medicine
Heme/Onc
Heme/Onc
Internal Medicine

## 2021-06-28 LAB — TM INTERPRETATION: SIGNIFICANT CHANGE UP

## 2021-06-29 LAB
% ALBUMIN: 44.2 % — SIGNIFICANT CHANGE UP
% ALPHA 1: 5.2 % — SIGNIFICANT CHANGE UP
% ALPHA 2: 8.5 % — SIGNIFICANT CHANGE UP
% BETA: 15.5 % — SIGNIFICANT CHANGE UP
% GAMMA: 26.6 % — SIGNIFICANT CHANGE UP
ALBUMIN SERPL ELPH-MCNC: 3.4 G/DL — LOW (ref 3.6–5.5)
ALBUMIN/GLOB SERPL ELPH: 0.8 RATIO — SIGNIFICANT CHANGE UP
ALPHA1 GLOB SERPL ELPH-MCNC: 0.4 G/DL — SIGNIFICANT CHANGE UP (ref 0.1–0.4)
ALPHA2 GLOB SERPL ELPH-MCNC: 0.7 G/DL — SIGNIFICANT CHANGE UP (ref 0.5–1)
B-GLOBULIN SERPL ELPH-MCNC: 1.2 G/DL — HIGH (ref 0.5–1)
GAMMA GLOBULIN: 2.1 G/DL — HIGH (ref 0.6–1.6)
INTERPRETATION SERPL IFE-IMP: SIGNIFICANT CHANGE UP
PROT PATTERN SERPL ELPH-IMP: SIGNIFICANT CHANGE UP

## 2021-06-30 NOTE — CHART NOTE - NSCHARTNOTEFT_GEN_A_CORE
- d/w patient BM biopsy results, Informed patient of low iron levels and need for supplementation, patient given contact information and asked to f/u with us as outpatient for her anemia

## 2021-07-03 DIAGNOSIS — Z95.2 PRESENCE OF PROSTHETIC HEART VALVE: ICD-10-CM

## 2021-07-03 DIAGNOSIS — N18.30 CHRONIC KIDNEY DISEASE, STAGE 3 UNSPECIFIED: ICD-10-CM

## 2021-07-03 DIAGNOSIS — N28.89 OTHER SPECIFIED DISORDERS OF KIDNEY AND URETER: ICD-10-CM

## 2021-07-03 DIAGNOSIS — E66.01 MORBID (SEVERE) OBESITY DUE TO EXCESS CALORIES: ICD-10-CM

## 2021-07-03 DIAGNOSIS — I27.20 PULMONARY HYPERTENSION, UNSPECIFIED: ICD-10-CM

## 2021-07-03 DIAGNOSIS — J44.1 CHRONIC OBSTRUCTIVE PULMONARY DISEASE WITH (ACUTE) EXACERBATION: ICD-10-CM

## 2021-07-03 DIAGNOSIS — I13.0 HYPERTENSIVE HEART AND CHRONIC KIDNEY DISEASE WITH HEART FAILURE AND STAGE 1 THROUGH STAGE 4 CHRONIC KIDNEY DISEASE, OR UNSPECIFIED CHRONIC KIDNEY DISEASE: ICD-10-CM

## 2021-07-03 DIAGNOSIS — M10.9 GOUT, UNSPECIFIED: ICD-10-CM

## 2021-07-03 DIAGNOSIS — I48.91 UNSPECIFIED ATRIAL FIBRILLATION: ICD-10-CM

## 2021-07-03 DIAGNOSIS — G47.33 OBSTRUCTIVE SLEEP APNEA (ADULT) (PEDIATRIC): ICD-10-CM

## 2021-07-03 DIAGNOSIS — J96.12 CHRONIC RESPIRATORY FAILURE WITH HYPERCAPNIA: ICD-10-CM

## 2021-07-03 DIAGNOSIS — I50.33 ACUTE ON CHRONIC DIASTOLIC (CONGESTIVE) HEART FAILURE: ICD-10-CM

## 2021-07-03 DIAGNOSIS — D64.9 ANEMIA, UNSPECIFIED: ICD-10-CM

## 2021-07-03 DIAGNOSIS — J96.11 CHRONIC RESPIRATORY FAILURE WITH HYPOXIA: ICD-10-CM

## 2021-07-06 LAB — CHROM ANALY OVERALL INTERP SPEC-IMP: SIGNIFICANT CHANGE UP

## 2021-09-15 ENCOUNTER — INPATIENT (INPATIENT)
Facility: HOSPITAL | Age: 71
LOS: 8 days | Discharge: HOME HEALTH | End: 2021-09-24
Attending: INTERNAL MEDICINE | Admitting: INTERNAL MEDICINE
Payer: MEDICARE

## 2021-09-15 VITALS
TEMPERATURE: 98 F | RESPIRATION RATE: 27 BRPM | DIASTOLIC BLOOD PRESSURE: 70 MMHG | HEART RATE: 62 BPM | HEIGHT: 64 IN | SYSTOLIC BLOOD PRESSURE: 166 MMHG | OXYGEN SATURATION: 100 %

## 2021-09-15 DIAGNOSIS — Z95.4 PRESENCE OF OTHER HEART-VALVE REPLACEMENT: Chronic | ICD-10-CM

## 2021-09-15 LAB
BASOPHILS # BLD AUTO: 0.03 K/UL — SIGNIFICANT CHANGE UP (ref 0–0.2)
BASOPHILS NFR BLD AUTO: 0.5 % — SIGNIFICANT CHANGE UP (ref 0–2)
BLOOD GAS VENOUS COMPREHENSIVE RESULT: SIGNIFICANT CHANGE UP
EOSINOPHIL # BLD AUTO: 0.06 K/UL — SIGNIFICANT CHANGE UP (ref 0–0.5)
EOSINOPHIL NFR BLD AUTO: 1 % — SIGNIFICANT CHANGE UP (ref 0–6)
HCT VFR BLD CALC: 30.1 % — LOW (ref 34.5–45)
HGB BLD-MCNC: 9 G/DL — LOW (ref 11.5–15.5)
IANC: 4.54 K/UL — SIGNIFICANT CHANGE UP (ref 1.5–8.5)
IMM GRANULOCYTES NFR BLD AUTO: 0.5 % — SIGNIFICANT CHANGE UP (ref 0–1.5)
LYMPHOCYTES # BLD AUTO: 1.04 K/UL — SIGNIFICANT CHANGE UP (ref 1–3.3)
LYMPHOCYTES # BLD AUTO: 16.8 % — SIGNIFICANT CHANGE UP (ref 13–44)
MCHC RBC-ENTMCNC: 28 PG — SIGNIFICANT CHANGE UP (ref 27–34)
MCHC RBC-ENTMCNC: 29.9 GM/DL — LOW (ref 32–36)
MCV RBC AUTO: 93.8 FL — SIGNIFICANT CHANGE UP (ref 80–100)
MONOCYTES # BLD AUTO: 0.49 K/UL — SIGNIFICANT CHANGE UP (ref 0–0.9)
MONOCYTES NFR BLD AUTO: 7.9 % — SIGNIFICANT CHANGE UP (ref 2–14)
NEUTROPHILS # BLD AUTO: 4.54 K/UL — SIGNIFICANT CHANGE UP (ref 1.8–7.4)
NEUTROPHILS NFR BLD AUTO: 73.3 % — SIGNIFICANT CHANGE UP (ref 43–77)
NRBC # BLD: 0 /100 WBCS — SIGNIFICANT CHANGE UP
NRBC # FLD: 0 K/UL — SIGNIFICANT CHANGE UP
PLATELET # BLD AUTO: 234 K/UL — SIGNIFICANT CHANGE UP (ref 150–400)
RBC # BLD: 3.21 M/UL — LOW (ref 3.8–5.2)
RBC # FLD: 16.7 % — HIGH (ref 10.3–14.5)
WBC # BLD: 6.19 K/UL — SIGNIFICANT CHANGE UP (ref 3.8–10.5)
WBC # FLD AUTO: 6.19 K/UL — SIGNIFICANT CHANGE UP (ref 3.8–10.5)

## 2021-09-15 PROCEDURE — 99285 EMERGENCY DEPT VISIT HI MDM: CPT

## 2021-09-15 PROCEDURE — 71045 X-RAY EXAM CHEST 1 VIEW: CPT | Mod: 26

## 2021-09-15 RX ORDER — ASPIRIN/CALCIUM CARB/MAGNESIUM 324 MG
324 TABLET ORAL ONCE
Refills: 0 | Status: COMPLETED | OUTPATIENT
Start: 2021-09-15 | End: 2021-09-15

## 2021-09-15 RX ADMIN — Medication 324 MILLIGRAM(S): at 22:55

## 2021-09-15 NOTE — ED ADULT NURSE NOTE - OBJECTIVE STATEMENT
Pt presents to room tr c, alert&orientedx4, amb with walker at baseline, pmhx COPD (3L of O2 NC at home), CHF, Anemia (hx of prbc transfusion) coming to ED for generalized weakness and left-sided chest pain x3 days. Upon arrival, pt able to speak in partial sentences, respirations even and non-labored at this time. Denies fever, n/v/d. 18g IV established on right ac, labs drawn and sent, medications given as ordered, NSR on cardiac monitor. Awaiting CXR

## 2021-09-15 NOTE — ED ADULT TRIAGE NOTE - CHIEF COMPLAINT QUOTE
2 days of generalized weakness, chest pain, dizziness - HX CHF, COPD home oxygen 3L, anemia requiring blood transfusions - last transfusion early June - moderately tachypneic in triage - EKG in progress

## 2021-09-15 NOTE — ED ADULT NURSE NOTE - NSIMPLEMENTINTERV_GEN_ALL_ED
18
Implemented All Fall Risk Interventions:  [place] to call system. Call bell, personal items and telephone within reach. Instruct patient to call for assistance. Room bathroom lighting operational. Non-slip footwear when patient is off stretcher. Physically safe environment: no spills, clutter or unnecessary equipment. Stretcher in lowest position, wheels locked, appropriate side rails in place. Provide visual cue, wrist band, yellow gown, etc. Monitor gait and stability. Monitor for mental status changes and reorient to person, place, and time. Review medications for side effects contributing to fall risk. Reinforce activity limits and safety measures with patient and family.

## 2021-09-16 DIAGNOSIS — G47.33 OBSTRUCTIVE SLEEP APNEA (ADULT) (PEDIATRIC): ICD-10-CM

## 2021-09-16 DIAGNOSIS — N18.9 CHRONIC KIDNEY DISEASE, UNSPECIFIED: ICD-10-CM

## 2021-09-16 DIAGNOSIS — I50.9 HEART FAILURE, UNSPECIFIED: ICD-10-CM

## 2021-09-16 DIAGNOSIS — I50.33 ACUTE ON CHRONIC DIASTOLIC (CONGESTIVE) HEART FAILURE: ICD-10-CM

## 2021-09-16 DIAGNOSIS — I48.20 CHRONIC ATRIAL FIBRILLATION, UNSPECIFIED: ICD-10-CM

## 2021-09-16 DIAGNOSIS — D50.0 IRON DEFICIENCY ANEMIA SECONDARY TO BLOOD LOSS (CHRONIC): ICD-10-CM

## 2021-09-16 DIAGNOSIS — J44.9 CHRONIC OBSTRUCTIVE PULMONARY DISEASE, UNSPECIFIED: ICD-10-CM

## 2021-09-16 DIAGNOSIS — R63.8 OTHER SYMPTOMS AND SIGNS CONCERNING FOOD AND FLUID INTAKE: ICD-10-CM

## 2021-09-16 LAB
ALBUMIN SERPL ELPH-MCNC: 4 G/DL — SIGNIFICANT CHANGE UP (ref 3.3–5)
ALBUMIN SERPL ELPH-MCNC: 4.2 G/DL — SIGNIFICANT CHANGE UP (ref 3.3–5)
ALP SERPL-CCNC: 143 U/L — HIGH (ref 40–120)
ALP SERPL-CCNC: 147 U/L — HIGH (ref 40–120)
ALT FLD-CCNC: 15 U/L — SIGNIFICANT CHANGE UP (ref 4–33)
ALT FLD-CCNC: 16 U/L — SIGNIFICANT CHANGE UP (ref 4–33)
ANION GAP SERPL CALC-SCNC: 11 MMOL/L — SIGNIFICANT CHANGE UP (ref 7–14)
ANION GAP SERPL CALC-SCNC: 11 MMOL/L — SIGNIFICANT CHANGE UP (ref 7–14)
AST SERPL-CCNC: 21 U/L — SIGNIFICANT CHANGE UP (ref 4–32)
AST SERPL-CCNC: 25 U/L — SIGNIFICANT CHANGE UP (ref 4–32)
BILIRUB SERPL-MCNC: 0.4 MG/DL — SIGNIFICANT CHANGE UP (ref 0.2–1.2)
BILIRUB SERPL-MCNC: 0.5 MG/DL — SIGNIFICANT CHANGE UP (ref 0.2–1.2)
BUN SERPL-MCNC: 52 MG/DL — HIGH (ref 7–23)
BUN SERPL-MCNC: 53 MG/DL — HIGH (ref 7–23)
CALCIUM SERPL-MCNC: 10.1 MG/DL — SIGNIFICANT CHANGE UP (ref 8.4–10.5)
CALCIUM SERPL-MCNC: 10.1 MG/DL — SIGNIFICANT CHANGE UP (ref 8.4–10.5)
CHLORIDE SERPL-SCNC: 96 MMOL/L — LOW (ref 98–107)
CHLORIDE SERPL-SCNC: 97 MMOL/L — LOW (ref 98–107)
CO2 SERPL-SCNC: 31 MMOL/L — SIGNIFICANT CHANGE UP (ref 22–31)
CO2 SERPL-SCNC: 32 MMOL/L — HIGH (ref 22–31)
CREAT SERPL-MCNC: 1.48 MG/DL — HIGH (ref 0.5–1.3)
CREAT SERPL-MCNC: 1.58 MG/DL — HIGH (ref 0.5–1.3)
GLUCOSE SERPL-MCNC: 102 MG/DL — HIGH (ref 70–99)
GLUCOSE SERPL-MCNC: 113 MG/DL — HIGH (ref 70–99)
HCT VFR BLD CALC: 30.7 % — LOW (ref 34.5–45)
HGB BLD-MCNC: 9.1 G/DL — LOW (ref 11.5–15.5)
MAGNESIUM SERPL-MCNC: 2.2 MG/DL — SIGNIFICANT CHANGE UP (ref 1.6–2.6)
MAGNESIUM SERPL-MCNC: 2.3 MG/DL — SIGNIFICANT CHANGE UP (ref 1.6–2.6)
MCHC RBC-ENTMCNC: 28.2 PG — SIGNIFICANT CHANGE UP (ref 27–34)
MCHC RBC-ENTMCNC: 29.6 GM/DL — LOW (ref 32–36)
MCV RBC AUTO: 95 FL — SIGNIFICANT CHANGE UP (ref 80–100)
NRBC # BLD: 0 /100 WBCS — SIGNIFICANT CHANGE UP
NRBC # FLD: 0 K/UL — SIGNIFICANT CHANGE UP
NT-PROBNP SERPL-SCNC: 2031 PG/ML — HIGH
PHOSPHATE SERPL-MCNC: 3 MG/DL — SIGNIFICANT CHANGE UP (ref 2.5–4.5)
PHOSPHATE SERPL-MCNC: 3.5 MG/DL — SIGNIFICANT CHANGE UP (ref 2.5–4.5)
PLATELET # BLD AUTO: 228 K/UL — SIGNIFICANT CHANGE UP (ref 150–400)
POTASSIUM SERPL-MCNC: 4.3 MMOL/L — SIGNIFICANT CHANGE UP (ref 3.5–5.3)
POTASSIUM SERPL-MCNC: 4.8 MMOL/L — SIGNIFICANT CHANGE UP (ref 3.5–5.3)
POTASSIUM SERPL-SCNC: 4.3 MMOL/L — SIGNIFICANT CHANGE UP (ref 3.5–5.3)
POTASSIUM SERPL-SCNC: 4.8 MMOL/L — SIGNIFICANT CHANGE UP (ref 3.5–5.3)
PROT SERPL-MCNC: 8.8 G/DL — HIGH (ref 6–8.3)
PROT SERPL-MCNC: 8.8 G/DL — HIGH (ref 6–8.3)
RBC # BLD: 3.23 M/UL — LOW (ref 3.8–5.2)
RBC # FLD: 16.4 % — HIGH (ref 10.3–14.5)
SARS-COV-2 RNA SPEC QL NAA+PROBE: SIGNIFICANT CHANGE UP
SODIUM SERPL-SCNC: 138 MMOL/L — SIGNIFICANT CHANGE UP (ref 135–145)
SODIUM SERPL-SCNC: 140 MMOL/L — SIGNIFICANT CHANGE UP (ref 135–145)
TROPONIN T, HIGH SENSITIVITY RESULT: 25 NG/L — SIGNIFICANT CHANGE UP
TROPONIN T, HIGH SENSITIVITY RESULT: 26 NG/L — SIGNIFICANT CHANGE UP
WBC # BLD: 7.25 K/UL — SIGNIFICANT CHANGE UP (ref 3.8–10.5)
WBC # FLD AUTO: 7.25 K/UL — SIGNIFICANT CHANGE UP (ref 3.8–10.5)

## 2021-09-16 PROCEDURE — 99223 1ST HOSP IP/OBS HIGH 75: CPT

## 2021-09-16 RX ORDER — SIMVASTATIN 20 MG/1
1 TABLET, FILM COATED ORAL
Qty: 30 | Refills: 0

## 2021-09-16 RX ORDER — SODIUM CHLORIDE 0.65 %
1 AEROSOL, SPRAY (ML) NASAL EVERY 6 HOURS
Refills: 0 | Status: DISCONTINUED | OUTPATIENT
Start: 2021-09-16 | End: 2021-09-24

## 2021-09-16 RX ORDER — PANTOPRAZOLE SODIUM 20 MG/1
1 TABLET, DELAYED RELEASE ORAL
Qty: 30 | Refills: 0

## 2021-09-16 RX ORDER — BUDESONIDE AND FORMOTEROL FUMARATE DIHYDRATE 160; 4.5 UG/1; UG/1
2 AEROSOL RESPIRATORY (INHALATION)
Refills: 0 | Status: DISCONTINUED | OUTPATIENT
Start: 2021-09-16 | End: 2021-09-24

## 2021-09-16 RX ORDER — FUROSEMIDE 40 MG
40 TABLET ORAL EVERY 8 HOURS
Refills: 0 | Status: DISCONTINUED | OUTPATIENT
Start: 2021-09-16 | End: 2021-09-21

## 2021-09-16 RX ORDER — ALLOPURINOL 300 MG
1 TABLET ORAL
Qty: 0 | Refills: 0 | DISCHARGE

## 2021-09-16 RX ORDER — HYDRALAZINE HCL 50 MG
25 TABLET ORAL ONCE
Refills: 0 | Status: COMPLETED | OUTPATIENT
Start: 2021-09-16 | End: 2021-09-16

## 2021-09-16 RX ORDER — BUDESONIDE AND FORMOTEROL FUMARATE DIHYDRATE 160; 4.5 UG/1; UG/1
2 AEROSOL RESPIRATORY (INHALATION)
Qty: 1 | Refills: 0

## 2021-09-16 RX ORDER — SODIUM CHLORIDE 0.65 %
1 AEROSOL, SPRAY (ML) NASAL EVERY 4 HOURS
Refills: 0 | Status: DISCONTINUED | OUTPATIENT
Start: 2021-09-16 | End: 2021-09-16

## 2021-09-16 RX ORDER — PANTOPRAZOLE SODIUM 20 MG/1
40 TABLET, DELAYED RELEASE ORAL DAILY
Refills: 0 | Status: DISCONTINUED | OUTPATIENT
Start: 2021-09-16 | End: 2021-09-24

## 2021-09-16 RX ORDER — OXYMETAZOLINE HYDROCHLORIDE 0.5 MG/ML
2 SPRAY NASAL
Refills: 0 | Status: COMPLETED | OUTPATIENT
Start: 2021-09-17 | End: 2021-09-19

## 2021-09-16 RX ORDER — METOPROLOL TARTRATE 50 MG
25 TABLET ORAL DAILY
Refills: 0 | Status: DISCONTINUED | OUTPATIENT
Start: 2021-09-16 | End: 2021-09-24

## 2021-09-16 RX ORDER — ACETAMINOPHEN 500 MG
650 TABLET ORAL EVERY 6 HOURS
Refills: 0 | Status: DISCONTINUED | OUTPATIENT
Start: 2021-09-16 | End: 2021-09-24

## 2021-09-16 RX ORDER — SODIUM CHLORIDE 0.65 %
2 AEROSOL, SPRAY (ML) NASAL
Qty: 1 | Refills: 1

## 2021-09-16 RX ORDER — FERROUS SULFATE 325(65) MG
325 TABLET ORAL DAILY
Refills: 0 | Status: DISCONTINUED | OUTPATIENT
Start: 2021-09-16 | End: 2021-09-24

## 2021-09-16 RX ORDER — ALBUTEROL 90 UG/1
2 AEROSOL, METERED ORAL EVERY 6 HOURS
Refills: 0 | Status: DISCONTINUED | OUTPATIENT
Start: 2021-09-16 | End: 2021-09-24

## 2021-09-16 RX ORDER — POLYETHYLENE GLYCOL 3350 17 G/17G
17 POWDER, FOR SOLUTION ORAL DAILY
Refills: 0 | Status: DISCONTINUED | OUTPATIENT
Start: 2021-09-16 | End: 2021-09-24

## 2021-09-16 RX ORDER — FUROSEMIDE 40 MG
40 TABLET ORAL ONCE
Refills: 0 | Status: COMPLETED | OUTPATIENT
Start: 2021-09-16 | End: 2021-09-16

## 2021-09-16 RX ORDER — OXYMETAZOLINE HYDROCHLORIDE 0.5 MG/ML
2 SPRAY NASAL ONCE
Refills: 0 | Status: COMPLETED | OUTPATIENT
Start: 2021-09-16 | End: 2021-09-16

## 2021-09-16 RX ORDER — ALBUTEROL 90 UG/1
2 AEROSOL, METERED ORAL
Qty: 0 | Refills: 0 | DISCHARGE

## 2021-09-16 RX ADMIN — Medication 1 SPRAY(S): at 17:55

## 2021-09-16 RX ADMIN — Medication 25 MILLIGRAM(S): at 06:48

## 2021-09-16 RX ADMIN — Medication 40 MILLIGRAM(S): at 17:05

## 2021-09-16 RX ADMIN — OXYMETAZOLINE HYDROCHLORIDE 2 SPRAY(S): 0.5 SPRAY NASAL at 20:02

## 2021-09-16 RX ADMIN — BUDESONIDE AND FORMOTEROL FUMARATE DIHYDRATE 2 PUFF(S): 160; 4.5 AEROSOL RESPIRATORY (INHALATION) at 10:19

## 2021-09-16 RX ADMIN — BUDESONIDE AND FORMOTEROL FUMARATE DIHYDRATE 2 PUFF(S): 160; 4.5 AEROSOL RESPIRATORY (INHALATION) at 21:27

## 2021-09-16 RX ADMIN — PANTOPRAZOLE SODIUM 40 MILLIGRAM(S): 20 TABLET, DELAYED RELEASE ORAL at 11:41

## 2021-09-16 RX ADMIN — Medication 25 MILLIGRAM(S): at 20:15

## 2021-09-16 RX ADMIN — Medication 325 MILLIGRAM(S): at 11:41

## 2021-09-16 RX ADMIN — POLYETHYLENE GLYCOL 3350 17 GRAM(S): 17 POWDER, FOR SOLUTION ORAL at 11:41

## 2021-09-16 RX ADMIN — Medication 40 MILLIGRAM(S): at 01:48

## 2021-09-16 RX ADMIN — Medication 40 MILLIGRAM(S): at 10:19

## 2021-09-16 NOTE — ED PROVIDER NOTE - ATTENDING CONTRIBUTION TO CARE
72 yo female with PMH HTN, CHF, afib s/p ablation, COPD on 3L O2, s/p MV and TV replacement presents for evaluation of generalized weakness and shortness of breath worse than baseline. PE shows BL LE edema and mildly increased work of breathing. A/P Labs, imaging, medicate, adm

## 2021-09-16 NOTE — ED PROVIDER NOTE - CLINICAL SUMMARY MEDICAL DECISION MAKING FREE TEXT BOX
DO Monique PGY-3: 70 y/o F presenting with acute worsening of SOB, near syncope eval for ACS vs acute CHF exacerbation. Likely will need admission for telemonitoring +/- diuresis

## 2021-09-16 NOTE — H&P ADULT - PROBLEM SELECTOR PLAN 5
-SEGUNDO FOBT positive last admission unable to do c-scope or endoscopy given comorbidities  -has afib, cannot AC as SEGUNDO  -iron supplementation  -protonix QD

## 2021-09-16 NOTE — H&P ADULT - PROBLEM SELECTOR PLAN 1
-patient with diastolic CHF, MV replacement and TV replacement  -pulmonary vascular congestion noted on chest xray  -is on bumex 2 mg QD, can do lasix 40 mg iv TID for now and monitor patients urine output  -strict i/os  -1.2 L fluid restriction  -echocardiogram

## 2021-09-16 NOTE — ED PROVIDER NOTE - OBJECTIVE STATEMENT
72 y/o F with PMH of HTN, CHF, afib s/p ablation, COPD on 3L O2, s/p MV and TV replacement presenting with generalized weakness. Also has SOB at baseline, however currently worse than normal. States she feels lightheaded, feels like she is going to pass out but denies LOC. Also having intermittent chest pain, denies at this time.

## 2021-09-16 NOTE — CHART NOTE - NSCHARTNOTEFT_GEN_A_CORE
Notified by RN patient with R nostril bleeding.   Patient seen and examined at bedside, kidney basin filled with a mixture of blood clots from R nostril and blood-saturated gauze. Patient hemodynamically stable in no apparent distress, speaking in full sentences. Upon examination, R nostril with trickling blood, L with slight when author was at bedside and patient with post nasal drip, coughing up 1/2 teaspoon of blood which resolved. Sprayed more afrin into the R nostril, held pressure with guaze however, blood still trickling. R nasal packing applied and more pressure applied. SBP 170s - manual 160s, to better control BP given 25 mg PO hydralazine x 1. House ENT paged, awaiting further recs.       [facility] Medicine   P. [id number].

## 2021-09-16 NOTE — H&P ADULT - HISTORY OF PRESENT ILLNESS
Patient is a 71 year old hx of HTN, dCHF, MV and TV replacement, MIGUEL not on CPAP due to unable to afford, afib s/p ablation not on AC due to chronic bleeding, unable to do c-scope or endoscopy due to medical conditions, COPD on 3 L NC, gout, who presents due to acute sob. She has shortness of breath at baseline, but states over the past few days this is worse. She states that this feels worse than usual, but feels similar to her prior CHF exacerbations. She states drinking more fluids than usual. Tried albuterol inhaler at home every 4 hours without relief of symptoms. She denies cp or palpitations. Does have bilateral lower extremity swelling equal on both sides. Unable to obtain sleep machine due to cost, discussed to her [employer] services if she needs help with food or utilities. Denies fevers, chills, nausea, vomiting, diarrhea, LOC, visual changes. Does have epistaxis occasionally due to nasal cannula use.     ED course: afebrile, HR 52-62, /68, RR 24 98% on 3 L nasal cannula. troponin negative times 2  Chest xray with bilateral pulmonary vascular congestion  40 mg iv lasix given

## 2021-09-16 NOTE — ED PROVIDER NOTE - NS ED ROS FT
CONSTITUTIONAL: +lightheadedness, no fevers, no chills, no dizziness  Eyes: no visual changes  Ears: no ear drainage, no ear pain  Nose: no nasal congestion  Mouth/Throat: no sore throat  CV: +chest pain, no palpitations  PULM: +SOB, no cough  GI: No n/v/d, no abd pain  : no dysuria, no hematuria  SKIN: no rashes  NEURO: no headache, no focal weakness or numbness  LYMPH/VASC: +LE swelling

## 2021-09-16 NOTE — H&P ADULT - NSHPPHYSICALEXAM_GEN_ALL_CORE
PHYSICAL EXAM:  GENERAL: NAD, well-developed  HEAD:  Atraumatic, Normocephalic  EYES: EOMI, PERRLA, conjunctiva and sclera clear  NECK: Supple, No JVD  CHEST/LUNG: Crackles bilateral lower to mid lung fields, short of breath speaking 3 sentences.   HEART: Irregular rate and rhythm; No murmurs, rubs, or gallops  ABDOMEN: Soft, Nontender, Nondistended; Bowel sounds present  EXTREMITIES:  2+ Peripheral Pulses, No clubbing, cyanosis. 1+ pitting edema bilateral lower lung fields   PSYCH: AAOx3  NEUROLOGY: non-focal  SKIN: No rashes or lesions

## 2021-09-16 NOTE — H&P ADULT - NSHPLABSRESULTS_GEN_ALL_CORE
.  LABS:                         9.0    6.19  )-----------( 234      ( 15 Sep 2021 23:17 )             30.1     09-15    138  |  96<L>  |  52<H>  ----------------------------<  113<H>  4.8   |  31  |  1.48<H>    Ca    10.1      15 Sep 2021 23:17    TPro  8.8<H>  /  Alb  4.0  /  TBili  0.4  /  DBili  x   /  AST  25  /  ALT  16  /  AlkPhos  147<H>  09-15        Serum Pro-Brain Natriuretic Peptide: 2031 pg/mL (09-15 @ 23:17)

## 2021-09-16 NOTE — H&P ADULT - PROBLEM SELECTOR PLAN 4
-on 3 L NC at home  -no wheezing auscultated on exam  -proair prn and symbicort, educated pt on using symbicort to prevent COPD exacerbations and hospitalizations as a result of this in the future.

## 2021-09-16 NOTE — ED PROVIDER NOTE - PHYSICAL EXAMINATION
gen: obese, appears uncomfortable  Mentation: AAO x 3  psych: mood appropriate  ENT: airway patent  Eyes: conjunctivae clear bilaterally  Cardio: RRR, +murmur  Resp: normal BS b/l  GI: s/nt/nd  Neuro: sensation and motor function intact  Skin: +sternotomy scar  MSK: normal movement of all extremities  Lymph/Vasc: + b/l LE edema

## 2021-09-16 NOTE — H&P ADULT - ASSESSMENT
Patient is a 71 year old hx of HTN, dCHF, MV and TV replacement, MIGUEL not on CPAP due to unable to afford, afib s/p ablation not on AC due to chronic bleeding, unable to do c-scope or endoscopy due to medical conditions, COPD on 3 L NC, gout, who presents due to acute sob.

## 2021-09-16 NOTE — CONSULT NOTE ADULT - SUBJECTIVE AND OBJECTIVE BOX
ENT CONSULT NOTE    HPI: Patient is a 71 year old hx of HTN, dCHF, MV and TV replacement, MIGUEL not on CPAP due to unable to afford, afib s/p ablation not on AC due to chronic bleeding, unable to do c-scope or endoscopy due to medical conditions, COPD on 3 L NC, gout, who presents due to acute sob. She has shortness of breath at baseline, but states over the past few days this is worse. She states that this feels worse than usual, but feels similar to her prior CHF exacerbations. She states drinking more fluids than usual. Tried albuterol inhaler at home every 4 hours without relief of symptoms. She denies cp or palpitations. Does have bilateral lower extremity swelling equal on both sides. Unable to obtain sleep machine due to cost, discussed to her [employer] services if she needs help with food or utilities. Denies fevers, chills, nausea, vomiting, diarrhea, LOC, visual changes. Does have epistaxis occasionally due to nasal cannula use.     ENT consulted because of R sided epistaxis that began an hour ago that did not stop with afrin and pressure. Has hx of past nose bleeds. Not on any antiplatelets/blood-thinners. On exam, patient had a slow trickle from the R nare, no active bleeding from L nare. Mild blood staining in posterior oropharynx. Afrin-soaked pledgets were placed in the nose. Dissolvable packing was placed and patient had no further bleeding. Denies any dripping in back of throat.    ICU Vital Signs Last 24 Hrs  T(C): 36.7 (16 Sep 2021 17:10), Max: 36.8 (15 Sep 2021 23:13)  T(F): 98 (16 Sep 2021 17:10), Max: 98.3 (16 Sep 2021 10:26)  HR: 78 (16 Sep 2021 20:11) (52 - 78)  BP: 164/87 (16 Sep 2021 20:11) (128/90 - 170/81)  BP(mean): --  ABP: --  ABP(mean): --  RR: 20 (16 Sep 2021 20:11) (16 - 20)  SpO2: 98% (16 Sep 2021 20:11) (96% - 100%)    PHYSICAL EXAM:    CONSTITUTIONAL: Well nourished, well developed, NON-DYSMORPHIC, and in no acute distress.    HEAD: normocephalic, atraumatic.  EARS: The right/left pinna was normal. The right/left external auditory canal was normal and the right/left TM was intact and well aerated.   NOSE: Normal external nose. Anterior nasal cavity patent with no obstruction. Inferior turbinates normally sized. R nasal epistaxis.  ORAL CAVITY/OROPHARYNX: normal mucosa. No erythema, lesions. Bloodstaining in posterior oropharynx  NECK: No cervical lymphadenopathy  RESPIRATORY: Respirations unlabored, no increased work of breathing with use of accessory muscles and retractions. No stridor.  CARDIAC: Warm extremities, no cyanosis.

## 2021-09-17 LAB
ANION GAP SERPL CALC-SCNC: 5 MMOL/L — LOW (ref 7–14)
BUN SERPL-MCNC: 53 MG/DL — HIGH (ref 7–23)
CALCIUM SERPL-MCNC: 9.9 MG/DL — SIGNIFICANT CHANGE UP (ref 8.4–10.5)
CHLORIDE SERPL-SCNC: 96 MMOL/L — LOW (ref 98–107)
CO2 SERPL-SCNC: 38 MMOL/L — HIGH (ref 22–31)
COVID-19 SPIKE DOMAIN AB INTERP: POSITIVE
COVID-19 SPIKE DOMAIN ANTIBODY RESULT: >250 U/ML — HIGH
CREAT SERPL-MCNC: 1.48 MG/DL — HIGH (ref 0.5–1.3)
GLUCOSE SERPL-MCNC: 104 MG/DL — HIGH (ref 70–99)
HCT VFR BLD CALC: 29.9 % — LOW (ref 34.5–45)
HGB BLD-MCNC: 8.5 G/DL — LOW (ref 11.5–15.5)
MAGNESIUM SERPL-MCNC: 2.1 MG/DL — SIGNIFICANT CHANGE UP (ref 1.6–2.6)
MCHC RBC-ENTMCNC: 27.1 PG — SIGNIFICANT CHANGE UP (ref 27–34)
MCHC RBC-ENTMCNC: 28.4 GM/DL — LOW (ref 32–36)
MCV RBC AUTO: 95.2 FL — SIGNIFICANT CHANGE UP (ref 80–100)
NRBC # BLD: 0 /100 WBCS — SIGNIFICANT CHANGE UP
NRBC # FLD: 0 K/UL — SIGNIFICANT CHANGE UP
PHOSPHATE SERPL-MCNC: 4.2 MG/DL — SIGNIFICANT CHANGE UP (ref 2.5–4.5)
PLATELET # BLD AUTO: 246 K/UL — SIGNIFICANT CHANGE UP (ref 150–400)
POTASSIUM SERPL-MCNC: 4.4 MMOL/L — SIGNIFICANT CHANGE UP (ref 3.5–5.3)
POTASSIUM SERPL-SCNC: 4.4 MMOL/L — SIGNIFICANT CHANGE UP (ref 3.5–5.3)
RBC # BLD: 3.14 M/UL — LOW (ref 3.8–5.2)
RBC # FLD: 16.7 % — HIGH (ref 10.3–14.5)
SARS-COV-2 IGG+IGM SERPL QL IA: >250 U/ML — HIGH
SARS-COV-2 IGG+IGM SERPL QL IA: POSITIVE
SODIUM SERPL-SCNC: 139 MMOL/L — SIGNIFICANT CHANGE UP (ref 135–145)
WBC # BLD: 7.04 K/UL — SIGNIFICANT CHANGE UP (ref 3.8–10.5)
WBC # FLD AUTO: 7.04 K/UL — SIGNIFICANT CHANGE UP (ref 3.8–10.5)

## 2021-09-17 RX ADMIN — Medication 325 MILLIGRAM(S): at 12:50

## 2021-09-17 RX ADMIN — Medication 25 MILLIGRAM(S): at 07:08

## 2021-09-17 RX ADMIN — OXYMETAZOLINE HYDROCHLORIDE 2 SPRAY(S): 0.5 SPRAY NASAL at 17:14

## 2021-09-17 RX ADMIN — Medication 40 MILLIGRAM(S): at 01:04

## 2021-09-17 RX ADMIN — BUDESONIDE AND FORMOTEROL FUMARATE DIHYDRATE 2 PUFF(S): 160; 4.5 AEROSOL RESPIRATORY (INHALATION) at 10:06

## 2021-09-17 RX ADMIN — Medication 40 MILLIGRAM(S): at 17:10

## 2021-09-17 RX ADMIN — Medication 1 SPRAY(S): at 23:17

## 2021-09-17 RX ADMIN — BUDESONIDE AND FORMOTEROL FUMARATE DIHYDRATE 2 PUFF(S): 160; 4.5 AEROSOL RESPIRATORY (INHALATION) at 21:35

## 2021-09-17 RX ADMIN — Medication 40 MILLIGRAM(S): at 10:06

## 2021-09-17 RX ADMIN — Medication 1 SPRAY(S): at 07:10

## 2021-09-17 RX ADMIN — PANTOPRAZOLE SODIUM 40 MILLIGRAM(S): 20 TABLET, DELAYED RELEASE ORAL at 12:50

## 2021-09-17 RX ADMIN — Medication 1 SPRAY(S): at 17:09

## 2021-09-17 RX ADMIN — Medication 1 SPRAY(S): at 12:50

## 2021-09-17 RX ADMIN — POLYETHYLENE GLYCOL 3350 17 GRAM(S): 17 POWDER, FOR SOLUTION ORAL at 12:50

## 2021-09-17 RX ADMIN — Medication 1 SPRAY(S): at 01:02

## 2021-09-17 NOTE — PROVIDER CONTACT NOTE (OTHER) - BACKGROUND
Patient admitted for heart failure
pt admitted for HF
patient admitted for CHF exacerbation, patient with hx COPD

## 2021-09-17 NOTE — PROVIDER CONTACT NOTE (OTHER) - SITUATION
pt. nose bleed
Patient's morning blood pressure was 170/77
Assumed care for patient for break coverage, patient on non-rebreather due to epistaxis. patient has hx COPD

## 2021-09-17 NOTE — PROVIDER CONTACT NOTE (OTHER) - ACTION/TREATMENT ORDERED:
MALICK Malave made aware. Morning metoprolol given
will order Venturi mask at lowest oxygen level and will provider will order titration up as necessary
hold pressure, and ACP to bedside

## 2021-09-17 NOTE — CONSULT NOTE ADULT - NSCONSULTADDITIONALINFOA_GEN_ALL_CORE
[facility] NEPHROLOGY  Rodrigue Amador M.D.  Rob Perez D.O.  Ana Song M.D.  Lucrecia López, MSN, ANP-C    Telephone: [phone]  Facsimile: [phone]    [address] [place] [address]

## 2021-09-17 NOTE — PROVIDER CONTACT NOTE (OTHER) - REASON
Assumed care for patient for break coverage, patient on non-rebreather due to epistaxis. patient has hx COPD
pt. nose bleed
Blood pressure parameter of Systolic greater than 160

## 2021-09-17 NOTE — PROVIDER CONTACT NOTE (OTHER) - ASSESSMENT
pt. nose bleeding, pt. denies dzziness, headache, SOB at baseline.
Patient stable and asymptomatic. Other VS stable
patient resting comfortably in bed, satting 100% on non-rebreather. Patient should not be on non-rebreather due to hx COPD

## 2021-09-17 NOTE — CONSULT NOTE ADULT - ASSESSMENT
1. CKD3b- likely due to CHF. Renal fxn stable. Continue diuresis.  2. Acute on Chronic Diastolic HF- continue aggressive diuresis. Medications per cardiology.  3. HTN- BP controlled. Continue with current anti-hypertensive medications. Monitor BP.  4. LE edema- due to HF. Plan as above.
A/P: 71F w/ complex medical hx with R nare epistaxis. Mild oozing from R nare that began 2 hours ago that did not respond to afrin and pressure. Hemodynamically stable. Uses nasal cannula at baseline, not on any antiplatelets. Dissolvable packing stopped the bleeding with no further signs of bleeding.    - Strict BP control  - Packing will dissolve  - Nasal saline sprays 4 times a day   - Nasal saline, 2 sprays to both nares 4 times a day  - Afrin, 2 sprays each nostril two times a day, 3 days total  - Avoid nasal trauma; no nose rubbing, blowing or manipulating nasal packing.  Sneeze with mouth open and pinching nares.  - Avoid bending with head blow the waist.    - No heavy lifting.

## 2021-09-17 NOTE — CONSULT NOTE ADULT - SUBJECTIVE AND OBJECTIVE BOX
[facility] NEPHROLOGY- CONSULTATION NOTE    Patient is a 71y Female with diastolic HF and s/p MV and TV replacement who presented to the hospital with SOB and was found to have decompensated HF.  Pt also with MIGUEL supposed to be on CPAP, but currently not.  Pt found to have elevated creatinine. She reports that she has mild kidney disease that is managed by her PMD.    Pt reports her SOB is improving as is her LE edema, though overall she is still quite uncomfortable.    PAST MEDICAL & SURGICAL HISTORY:  Atrial fibrillation  S/P Ablation    Pulmonary hypertension    MIGUEL (obstructive sleep apnea)    COPD (chronic obstructive pulmonary disease)  on home O2    CHF (congestive heart failure)    HTN (hypertension)    Gout    Mitral valve replaced    Tricuspid valve replaced    CHF (congestive heart failure)    Hypertension    COPD (chronic obstructive pulmonary disease)    History of mitral valve replacement with mechanical valve    Tricuspid valve replaced  mechanical    No significant past surgical history      No Known Allergies    Home Medications Reviewed  Hospital Medications:   MEDICATIONS  (STANDING):  budesonide 160 MICROgram(s)/formoterol 4.5 MICROgram(s) Inhaler 2 Puff(s) Inhalation two times a day  ferrous    sulfate 325 milliGRAM(s) Oral daily  furosemide   Injectable 40 milliGRAM(s) IV Push every 8 hours  metoprolol succinate ER 25 milliGRAM(s) Oral daily  oxymetazoline 0.05% Nasal Spray 2 Spray(s) Both Nostrils two times a day  pantoprazole   Suspension 40 milliGRAM(s) Oral daily  polyethylene glycol 3350 17 Gram(s) Oral daily  sodium chloride 0.65% Nasal 1 Spray(s) Both Nostrils every 6 hours    SOCIAL HISTORY:  Denies ETOh,Smoking,   FAMILY HISTORY:  Family history of hypertension (Father, Sibling)    Family history of stroke    Family history of hypertension (Mother)      REVIEW OF SYSTEMS:  CONSTITUTIONAL: + weakness, no fevers or chills  EYES/ENT: No visual changes;  No vertigo or throat pain. +epistaxis.   NECK: No pain or stiffness  RESPIRATORY: +cough, +wheezing, no hemoptysis; + shortness of breath  CARDIOVASCULAR: No chest pain or palpitations.  GASTROINTESTINAL: No abdominal or epigastric pain. No nausea, vomiting, or hematemesis; No diarrhea or constipation. No melena or hematochezia.  GENITOURINARY: No dysuria, frequency, foamy urine, urinary urgency, incontinence or hematuria  NEUROLOGICAL: No numbness or weakness  SKIN: No itching, burning, rashes, or lesions   VASCULAR: + bilateral lower extremity edema.   All other review of systems is negative unless indicated above.    VITALS:  T(F): 98.2 (09-17-21 @ 16:37), Max: 98.7 (09-17-21 @ 00:52)  HR: 71 (09-17-21 @ 16:37)  BP: 131/64 (09-17-21 @ 16:37)  RR: 18 (09-17-21 @ 16:37)  SpO2: 98% (09-17-21 @ 16:37)  Wt(kg): --    09-16 @ 07:01  -  09-17 @ 07:00  --------------------------------------------------------  IN: 0 mL / OUT: 950 mL / NET: -950 mL          PHYSICAL EXAM:  Constitutional: uncomfortable appearing, severe obesity  HEENT: anicteric sclera, oropharynx clear, MMM  Neck: No JVD  Respiratory: Decreased BS B bases  Cardiovascular: S1, S2, RRR  Gastrointestinal: BS+, soft, NT/ND  Extremities: No cyanosis or clubbing. + mild B LE edema  Neurological: A/O x 3, no focal deficits  Psychiatric: Normal mood, normal affect  : No CVA tenderness. No sarmiento.   Skin: No rashes      LABS:  09-17    139  |  96<L>  |  53<H>  ----------------------------<  104<H>  4.4   |  38<H>  |  1.48<H>    Ca    9.9      17 Sep 2021 07:58  Phos  4.2     09-17  Mg     2.10     09-17    TPro  8.8<H>  /  Alb  4.2  /  TBili  0.5  /  DBili      /  AST  21  /  ALT  15  /  AlkPhos  143<H>  09-16    Creatinine Trend: 1.48 <--, 1.58 <--, 1.48 <--                        8.5    7.04  )-----------( 246      ( 17 Sep 2021 07:58 )             29.9     Urine Studies:      RADIOLOGY & ADDITIONAL STUDIES:    < from: Xray Chest 1 View- PORTABLE-Urgent (09.15.21 @ 23:49) >    EXAM:  XR CHEST PORTABLE URGENT 1V        PROCEDURE DATE:  Sep 15 2021         INTERPRETATION:  CLINICAL INDICATION: Chest pain    TECHNIQUE: Frontal view of the chest.    COMPARISON: Chest radiograph 6/18/2021    FINDINGS:    Cardiac silhouette isenlarged. Status post median sternotomy and valve replacement.  Prominent vascular lung markings and small right pleural effusion.  Degenerative changes of the thoracic spine.    IMPRESSION: Perihilar haziness and cardiomegaly consistent with pulmonary edema.          --- End of Report ---            MARTITA SCHWARZ MD; Resident Radiology  This document has been electronically signed.  DELIA PEDERSEN MD; Attending Radiologist  This document has been electronically signed. Sep 16 2021  7:44AM    < end of copied text >

## 2021-09-18 LAB
ANION GAP SERPL CALC-SCNC: 7 MMOL/L — SIGNIFICANT CHANGE UP (ref 7–14)
BUN SERPL-MCNC: 58 MG/DL — HIGH (ref 7–23)
CALCIUM SERPL-MCNC: 10.2 MG/DL — SIGNIFICANT CHANGE UP (ref 8.4–10.5)
CHLORIDE SERPL-SCNC: 96 MMOL/L — LOW (ref 98–107)
CO2 SERPL-SCNC: 35 MMOL/L — HIGH (ref 22–31)
CREAT SERPL-MCNC: 1.52 MG/DL — HIGH (ref 0.5–1.3)
GLUCOSE SERPL-MCNC: 108 MG/DL — HIGH (ref 70–99)
HCT VFR BLD CALC: 30.4 % — LOW (ref 34.5–45)
HGB BLD-MCNC: 8.9 G/DL — LOW (ref 11.5–15.5)
MAGNESIUM SERPL-MCNC: 2.2 MG/DL — SIGNIFICANT CHANGE UP (ref 1.6–2.6)
MCHC RBC-ENTMCNC: 27.6 PG — SIGNIFICANT CHANGE UP (ref 27–34)
MCHC RBC-ENTMCNC: 29.3 GM/DL — LOW (ref 32–36)
MCV RBC AUTO: 94.4 FL — SIGNIFICANT CHANGE UP (ref 80–100)
NRBC # BLD: 0 /100 WBCS — SIGNIFICANT CHANGE UP
NRBC # FLD: 0 K/UL — SIGNIFICANT CHANGE UP
PHOSPHATE SERPL-MCNC: 3.8 MG/DL — SIGNIFICANT CHANGE UP (ref 2.5–4.5)
PLATELET # BLD AUTO: 248 K/UL — SIGNIFICANT CHANGE UP (ref 150–400)
POTASSIUM SERPL-MCNC: 4.6 MMOL/L — SIGNIFICANT CHANGE UP (ref 3.5–5.3)
POTASSIUM SERPL-SCNC: 4.6 MMOL/L — SIGNIFICANT CHANGE UP (ref 3.5–5.3)
RBC # BLD: 3.22 M/UL — LOW (ref 3.8–5.2)
RBC # FLD: 16.4 % — HIGH (ref 10.3–14.5)
SODIUM SERPL-SCNC: 138 MMOL/L — SIGNIFICANT CHANGE UP (ref 135–145)
WBC # BLD: 6.51 K/UL — SIGNIFICANT CHANGE UP (ref 3.8–10.5)
WBC # FLD AUTO: 6.51 K/UL — SIGNIFICANT CHANGE UP (ref 3.8–10.5)

## 2021-09-18 RX ADMIN — Medication 1 SPRAY(S): at 17:47

## 2021-09-18 RX ADMIN — Medication 40 MILLIGRAM(S): at 13:10

## 2021-09-18 RX ADMIN — Medication 1 SPRAY(S): at 05:46

## 2021-09-18 RX ADMIN — OXYMETAZOLINE HYDROCHLORIDE 2 SPRAY(S): 0.5 SPRAY NASAL at 05:47

## 2021-09-18 RX ADMIN — POLYETHYLENE GLYCOL 3350 17 GRAM(S): 17 POWDER, FOR SOLUTION ORAL at 13:10

## 2021-09-18 RX ADMIN — Medication 25 MILLIGRAM(S): at 05:46

## 2021-09-18 RX ADMIN — OXYMETAZOLINE HYDROCHLORIDE 2 SPRAY(S): 0.5 SPRAY NASAL at 17:46

## 2021-09-18 RX ADMIN — Medication 1 SPRAY(S): at 23:35

## 2021-09-18 RX ADMIN — Medication 40 MILLIGRAM(S): at 21:24

## 2021-09-18 RX ADMIN — PANTOPRAZOLE SODIUM 40 MILLIGRAM(S): 20 TABLET, DELAYED RELEASE ORAL at 13:10

## 2021-09-18 RX ADMIN — Medication 40 MILLIGRAM(S): at 02:26

## 2021-09-18 RX ADMIN — Medication 1 SPRAY(S): at 13:22

## 2021-09-18 RX ADMIN — BUDESONIDE AND FORMOTEROL FUMARATE DIHYDRATE 2 PUFF(S): 160; 4.5 AEROSOL RESPIRATORY (INHALATION) at 21:24

## 2021-09-18 RX ADMIN — BUDESONIDE AND FORMOTEROL FUMARATE DIHYDRATE 2 PUFF(S): 160; 4.5 AEROSOL RESPIRATORY (INHALATION) at 09:32

## 2021-09-18 RX ADMIN — Medication 325 MILLIGRAM(S): at 13:22

## 2021-09-19 LAB
ANION GAP SERPL CALC-SCNC: 8 MMOL/L — SIGNIFICANT CHANGE UP (ref 7–14)
BUN SERPL-MCNC: 64 MG/DL — HIGH (ref 7–23)
CALCIUM SERPL-MCNC: 10.6 MG/DL — HIGH (ref 8.4–10.5)
CHLORIDE SERPL-SCNC: 92 MMOL/L — LOW (ref 98–107)
CO2 SERPL-SCNC: 38 MMOL/L — HIGH (ref 22–31)
CREAT SERPL-MCNC: 1.55 MG/DL — HIGH (ref 0.5–1.3)
GLUCOSE SERPL-MCNC: 99 MG/DL — SIGNIFICANT CHANGE UP (ref 70–99)
HCT VFR BLD CALC: 31.3 % — LOW (ref 34.5–45)
HGB BLD-MCNC: 9.4 G/DL — LOW (ref 11.5–15.5)
MAGNESIUM SERPL-MCNC: 2.4 MG/DL — SIGNIFICANT CHANGE UP (ref 1.6–2.6)
MCHC RBC-ENTMCNC: 28.1 PG — SIGNIFICANT CHANGE UP (ref 27–34)
MCHC RBC-ENTMCNC: 30 GM/DL — LOW (ref 32–36)
MCV RBC AUTO: 93.4 FL — SIGNIFICANT CHANGE UP (ref 80–100)
NRBC # BLD: 0 /100 WBCS — SIGNIFICANT CHANGE UP
NRBC # FLD: 0 K/UL — SIGNIFICANT CHANGE UP
PHOSPHATE SERPL-MCNC: 3.3 MG/DL — SIGNIFICANT CHANGE UP (ref 2.5–4.5)
PLATELET # BLD AUTO: 282 K/UL — SIGNIFICANT CHANGE UP (ref 150–400)
POTASSIUM SERPL-MCNC: 4.5 MMOL/L — SIGNIFICANT CHANGE UP (ref 3.5–5.3)
POTASSIUM SERPL-SCNC: 4.5 MMOL/L — SIGNIFICANT CHANGE UP (ref 3.5–5.3)
RBC # BLD: 3.35 M/UL — LOW (ref 3.8–5.2)
RBC # FLD: 16.3 % — HIGH (ref 10.3–14.5)
SODIUM SERPL-SCNC: 138 MMOL/L — SIGNIFICANT CHANGE UP (ref 135–145)
WBC # BLD: 7.53 K/UL — SIGNIFICANT CHANGE UP (ref 3.8–10.5)
WBC # FLD AUTO: 7.53 K/UL — SIGNIFICANT CHANGE UP (ref 3.8–10.5)

## 2021-09-19 RX ADMIN — Medication 650 MILLIGRAM(S): at 11:18

## 2021-09-19 RX ADMIN — Medication 25 MILLIGRAM(S): at 05:30

## 2021-09-19 RX ADMIN — OXYMETAZOLINE HYDROCHLORIDE 2 SPRAY(S): 0.5 SPRAY NASAL at 05:30

## 2021-09-19 RX ADMIN — Medication 650 MILLIGRAM(S): at 21:25

## 2021-09-19 RX ADMIN — Medication 650 MILLIGRAM(S): at 22:24

## 2021-09-19 RX ADMIN — Medication 40 MILLIGRAM(S): at 09:16

## 2021-09-19 RX ADMIN — Medication 650 MILLIGRAM(S): at 10:18

## 2021-09-19 RX ADMIN — BUDESONIDE AND FORMOTEROL FUMARATE DIHYDRATE 2 PUFF(S): 160; 4.5 AEROSOL RESPIRATORY (INHALATION) at 21:26

## 2021-09-19 RX ADMIN — BUDESONIDE AND FORMOTEROL FUMARATE DIHYDRATE 2 PUFF(S): 160; 4.5 AEROSOL RESPIRATORY (INHALATION) at 09:16

## 2021-09-19 RX ADMIN — Medication 1 SPRAY(S): at 05:30

## 2021-09-19 RX ADMIN — OXYMETAZOLINE HYDROCHLORIDE 2 SPRAY(S): 0.5 SPRAY NASAL at 17:02

## 2021-09-19 RX ADMIN — PANTOPRAZOLE SODIUM 40 MILLIGRAM(S): 20 TABLET, DELAYED RELEASE ORAL at 11:46

## 2021-09-19 RX ADMIN — Medication 40 MILLIGRAM(S): at 17:02

## 2021-09-19 RX ADMIN — Medication 1 SPRAY(S): at 17:02

## 2021-09-19 RX ADMIN — Medication 40 MILLIGRAM(S): at 02:34

## 2021-09-19 RX ADMIN — Medication 30 MILLILITER(S): at 14:42

## 2021-09-19 RX ADMIN — Medication 325 MILLIGRAM(S): at 11:46

## 2021-09-19 RX ADMIN — Medication 1 SPRAY(S): at 11:47

## 2021-09-20 LAB
ANION GAP SERPL CALC-SCNC: 5 MMOL/L — LOW (ref 7–14)
BUN SERPL-MCNC: 62 MG/DL — HIGH (ref 7–23)
CALCIUM SERPL-MCNC: 10.7 MG/DL — HIGH (ref 8.4–10.5)
CHLORIDE SERPL-SCNC: 95 MMOL/L — LOW (ref 98–107)
CO2 SERPL-SCNC: 37 MMOL/L — HIGH (ref 22–31)
CREAT SERPL-MCNC: 1.55 MG/DL — HIGH (ref 0.5–1.3)
GLUCOSE SERPL-MCNC: 116 MG/DL — HIGH (ref 70–99)
HCT VFR BLD CALC: 31.1 % — LOW (ref 34.5–45)
HGB BLD-MCNC: 9.3 G/DL — LOW (ref 11.5–15.5)
MAGNESIUM SERPL-MCNC: 2.3 MG/DL — SIGNIFICANT CHANGE UP (ref 1.6–2.6)
MCHC RBC-ENTMCNC: 27.4 PG — SIGNIFICANT CHANGE UP (ref 27–34)
MCHC RBC-ENTMCNC: 29.9 GM/DL — LOW (ref 32–36)
MCV RBC AUTO: 91.7 FL — SIGNIFICANT CHANGE UP (ref 80–100)
NRBC # BLD: 0 /100 WBCS — SIGNIFICANT CHANGE UP
NRBC # FLD: 0 K/UL — SIGNIFICANT CHANGE UP
PHOSPHATE SERPL-MCNC: 3.2 MG/DL — SIGNIFICANT CHANGE UP (ref 2.5–4.5)
PLATELET # BLD AUTO: 292 K/UL — SIGNIFICANT CHANGE UP (ref 150–400)
POTASSIUM SERPL-MCNC: 4.5 MMOL/L — SIGNIFICANT CHANGE UP (ref 3.5–5.3)
POTASSIUM SERPL-SCNC: 4.5 MMOL/L — SIGNIFICANT CHANGE UP (ref 3.5–5.3)
RBC # BLD: 3.39 M/UL — LOW (ref 3.8–5.2)
RBC # FLD: 16.5 % — HIGH (ref 10.3–14.5)
SODIUM SERPL-SCNC: 137 MMOL/L — SIGNIFICANT CHANGE UP (ref 135–145)
WBC # BLD: 7.72 K/UL — SIGNIFICANT CHANGE UP (ref 3.8–10.5)
WBC # FLD AUTO: 7.72 K/UL — SIGNIFICANT CHANGE UP (ref 3.8–10.5)

## 2021-09-20 RX ADMIN — BUDESONIDE AND FORMOTEROL FUMARATE DIHYDRATE 2 PUFF(S): 160; 4.5 AEROSOL RESPIRATORY (INHALATION) at 09:44

## 2021-09-20 RX ADMIN — Medication 25 MILLIGRAM(S): at 06:08

## 2021-09-20 RX ADMIN — Medication 1 SPRAY(S): at 23:19

## 2021-09-20 RX ADMIN — Medication 650 MILLIGRAM(S): at 21:12

## 2021-09-20 RX ADMIN — Medication 1 SPRAY(S): at 12:41

## 2021-09-20 RX ADMIN — PANTOPRAZOLE SODIUM 40 MILLIGRAM(S): 20 TABLET, DELAYED RELEASE ORAL at 12:42

## 2021-09-20 RX ADMIN — Medication 40 MILLIGRAM(S): at 09:45

## 2021-09-20 RX ADMIN — Medication 650 MILLIGRAM(S): at 13:13

## 2021-09-20 RX ADMIN — Medication 1 SPRAY(S): at 01:20

## 2021-09-20 RX ADMIN — Medication 30 MILLILITER(S): at 20:33

## 2021-09-20 RX ADMIN — Medication 40 MILLIGRAM(S): at 02:48

## 2021-09-20 RX ADMIN — ALBUTEROL 2 PUFF(S): 90 AEROSOL, METERED ORAL at 09:45

## 2021-09-20 RX ADMIN — Medication 1 SPRAY(S): at 06:07

## 2021-09-20 RX ADMIN — Medication 650 MILLIGRAM(S): at 20:27

## 2021-09-20 RX ADMIN — Medication 1 SPRAY(S): at 17:28

## 2021-09-20 RX ADMIN — Medication 40 MILLIGRAM(S): at 17:30

## 2021-09-20 RX ADMIN — Medication 325 MILLIGRAM(S): at 12:42

## 2021-09-20 RX ADMIN — Medication 650 MILLIGRAM(S): at 12:43

## 2021-09-20 RX ADMIN — BUDESONIDE AND FORMOTEROL FUMARATE DIHYDRATE 2 PUFF(S): 160; 4.5 AEROSOL RESPIRATORY (INHALATION) at 21:45

## 2021-09-21 LAB
ALBUMIN SERPL ELPH-MCNC: 4 G/DL — SIGNIFICANT CHANGE UP (ref 3.3–5)
ALP SERPL-CCNC: 119 U/L — SIGNIFICANT CHANGE UP (ref 40–120)
ALT FLD-CCNC: 8 U/L — SIGNIFICANT CHANGE UP (ref 4–33)
ANION GAP SERPL CALC-SCNC: 9 MMOL/L — SIGNIFICANT CHANGE UP (ref 7–14)
AST SERPL-CCNC: 17 U/L — SIGNIFICANT CHANGE UP (ref 4–32)
BILIRUB SERPL-MCNC: 0.4 MG/DL — SIGNIFICANT CHANGE UP (ref 0.2–1.2)
BUN SERPL-MCNC: 64 MG/DL — HIGH (ref 7–23)
CALCIUM SERPL-MCNC: 10.5 MG/DL — SIGNIFICANT CHANGE UP (ref 8.4–10.5)
CHLORIDE SERPL-SCNC: 93 MMOL/L — LOW (ref 98–107)
CO2 SERPL-SCNC: 35 MMOL/L — HIGH (ref 22–31)
CREAT SERPL-MCNC: 1.76 MG/DL — HIGH (ref 0.5–1.3)
GLUCOSE SERPL-MCNC: 152 MG/DL — HIGH (ref 70–99)
HCT VFR BLD CALC: 30.8 % — LOW (ref 34.5–45)
HGB BLD-MCNC: 9.1 G/DL — LOW (ref 11.5–15.5)
MAGNESIUM SERPL-MCNC: 2.6 MG/DL — SIGNIFICANT CHANGE UP (ref 1.6–2.6)
MCHC RBC-ENTMCNC: 27.7 PG — SIGNIFICANT CHANGE UP (ref 27–34)
MCHC RBC-ENTMCNC: 29.5 GM/DL — LOW (ref 32–36)
MCV RBC AUTO: 93.6 FL — SIGNIFICANT CHANGE UP (ref 80–100)
NRBC # BLD: 0 /100 WBCS — SIGNIFICANT CHANGE UP
NRBC # FLD: 0 K/UL — SIGNIFICANT CHANGE UP
PHOSPHATE SERPL-MCNC: 4.2 MG/DL — SIGNIFICANT CHANGE UP (ref 2.5–4.5)
PLATELET # BLD AUTO: 302 K/UL — SIGNIFICANT CHANGE UP (ref 150–400)
POTASSIUM SERPL-MCNC: 4 MMOL/L — SIGNIFICANT CHANGE UP (ref 3.5–5.3)
POTASSIUM SERPL-SCNC: 4 MMOL/L — SIGNIFICANT CHANGE UP (ref 3.5–5.3)
PROT SERPL-MCNC: 8.7 G/DL — HIGH (ref 6–8.3)
PTH-INTACT FLD-MCNC: 120 PG/ML — HIGH (ref 15–65)
RBC # BLD: 3.29 M/UL — LOW (ref 3.8–5.2)
RBC # FLD: 16.5 % — HIGH (ref 10.3–14.5)
SODIUM SERPL-SCNC: 137 MMOL/L — SIGNIFICANT CHANGE UP (ref 135–145)
WBC # BLD: 7.81 K/UL — SIGNIFICANT CHANGE UP (ref 3.8–10.5)
WBC # FLD AUTO: 7.81 K/UL — SIGNIFICANT CHANGE UP (ref 3.8–10.5)

## 2021-09-21 PROCEDURE — 93306 TTE W/DOPPLER COMPLETE: CPT | Mod: 26

## 2021-09-21 RX ORDER — BUMETANIDE 0.25 MG/ML
2 INJECTION INTRAMUSCULAR; INTRAVENOUS
Refills: 0 | Status: DISCONTINUED | OUTPATIENT
Start: 2021-09-22 | End: 2021-09-23

## 2021-09-21 RX ORDER — ACETAMINOPHEN 500 MG
650 TABLET ORAL ONCE
Refills: 0 | Status: COMPLETED | OUTPATIENT
Start: 2021-09-21 | End: 2021-09-21

## 2021-09-21 RX ORDER — HYDRALAZINE HCL 50 MG
25 TABLET ORAL THREE TIMES A DAY
Refills: 0 | Status: DISCONTINUED | OUTPATIENT
Start: 2021-09-21 | End: 2021-09-24

## 2021-09-21 RX ADMIN — BUDESONIDE AND FORMOTEROL FUMARATE DIHYDRATE 2 PUFF(S): 160; 4.5 AEROSOL RESPIRATORY (INHALATION) at 21:05

## 2021-09-21 RX ADMIN — Medication 25 MILLIGRAM(S): at 21:04

## 2021-09-21 RX ADMIN — Medication 325 MILLIGRAM(S): at 12:26

## 2021-09-21 RX ADMIN — Medication 1 SPRAY(S): at 16:39

## 2021-09-21 RX ADMIN — Medication 25 MILLIGRAM(S): at 04:44

## 2021-09-21 RX ADMIN — Medication 650 MILLIGRAM(S): at 01:09

## 2021-09-21 RX ADMIN — Medication 650 MILLIGRAM(S): at 06:28

## 2021-09-21 RX ADMIN — BUDESONIDE AND FORMOTEROL FUMARATE DIHYDRATE 2 PUFF(S): 160; 4.5 AEROSOL RESPIRATORY (INHALATION) at 10:08

## 2021-09-21 RX ADMIN — Medication 1 SPRAY(S): at 04:44

## 2021-09-21 RX ADMIN — Medication 40 MILLIGRAM(S): at 10:07

## 2021-09-21 RX ADMIN — Medication 40 MILLIGRAM(S): at 01:09

## 2021-09-22 LAB
ANION GAP SERPL CALC-SCNC: 20 MMOL/L — HIGH (ref 7–14)
BUN SERPL-MCNC: 64 MG/DL — HIGH (ref 7–23)
CALCIUM SERPL-MCNC: 10.3 MG/DL — SIGNIFICANT CHANGE UP (ref 8.4–10.5)
CHLORIDE SERPL-SCNC: 91 MMOL/L — LOW (ref 98–107)
CO2 SERPL-SCNC: 31 MMOL/L — SIGNIFICANT CHANGE UP (ref 22–31)
CREAT SERPL-MCNC: 1.55 MG/DL — HIGH (ref 0.5–1.3)
GLUCOSE SERPL-MCNC: 120 MG/DL — HIGH (ref 70–99)
HCT VFR BLD CALC: 31.3 % — LOW (ref 34.5–45)
HGB BLD-MCNC: 9.2 G/DL — LOW (ref 11.5–15.5)
MAGNESIUM SERPL-MCNC: 2.7 MG/DL — HIGH (ref 1.6–2.6)
MCHC RBC-ENTMCNC: 27.4 PG — SIGNIFICANT CHANGE UP (ref 27–34)
MCHC RBC-ENTMCNC: 29.4 GM/DL — LOW (ref 32–36)
MCV RBC AUTO: 93.2 FL — SIGNIFICANT CHANGE UP (ref 80–100)
NRBC # BLD: 0 /100 WBCS — SIGNIFICANT CHANGE UP
NRBC # FLD: 0 K/UL — SIGNIFICANT CHANGE UP
PHOSPHATE SERPL-MCNC: 4.7 MG/DL — HIGH (ref 2.5–4.5)
PLATELET # BLD AUTO: 305 K/UL — SIGNIFICANT CHANGE UP (ref 150–400)
POTASSIUM SERPL-MCNC: 4.4 MMOL/L — SIGNIFICANT CHANGE UP (ref 3.5–5.3)
POTASSIUM SERPL-SCNC: 4.4 MMOL/L — SIGNIFICANT CHANGE UP (ref 3.5–5.3)
RBC # BLD: 3.36 M/UL — LOW (ref 3.8–5.2)
RBC # FLD: 16.4 % — HIGH (ref 10.3–14.5)
SARS-COV-2 RNA SPEC QL NAA+PROBE: SIGNIFICANT CHANGE UP
SODIUM SERPL-SCNC: 142 MMOL/L — SIGNIFICANT CHANGE UP (ref 135–145)
WBC # BLD: 7.06 K/UL — SIGNIFICANT CHANGE UP (ref 3.8–10.5)
WBC # FLD AUTO: 7.06 K/UL — SIGNIFICANT CHANGE UP (ref 3.8–10.5)

## 2021-09-22 RX ADMIN — Medication 1 SPRAY(S): at 12:55

## 2021-09-22 RX ADMIN — Medication 325 MILLIGRAM(S): at 12:55

## 2021-09-22 RX ADMIN — BUDESONIDE AND FORMOTEROL FUMARATE DIHYDRATE 2 PUFF(S): 160; 4.5 AEROSOL RESPIRATORY (INHALATION) at 10:34

## 2021-09-22 RX ADMIN — BUDESONIDE AND FORMOTEROL FUMARATE DIHYDRATE 2 PUFF(S): 160; 4.5 AEROSOL RESPIRATORY (INHALATION) at 21:03

## 2021-09-22 RX ADMIN — BUMETANIDE 2 MILLIGRAM(S): 0.25 INJECTION INTRAMUSCULAR; INTRAVENOUS at 16:35

## 2021-09-22 RX ADMIN — Medication 25 MILLIGRAM(S): at 12:56

## 2021-09-22 RX ADMIN — Medication 1 SPRAY(S): at 00:26

## 2021-09-22 RX ADMIN — Medication 25 MILLIGRAM(S): at 21:03

## 2021-09-22 RX ADMIN — Medication 1 SPRAY(S): at 06:42

## 2021-09-22 RX ADMIN — BUMETANIDE 2 MILLIGRAM(S): 0.25 INJECTION INTRAMUSCULAR; INTRAVENOUS at 05:20

## 2021-09-22 RX ADMIN — PANTOPRAZOLE SODIUM 40 MILLIGRAM(S): 20 TABLET, DELAYED RELEASE ORAL at 12:55

## 2021-09-22 RX ADMIN — Medication 25 MILLIGRAM(S): at 05:20

## 2021-09-22 RX ADMIN — Medication 1 SPRAY(S): at 16:34

## 2021-09-23 LAB
ANION GAP SERPL CALC-SCNC: 12 MMOL/L — SIGNIFICANT CHANGE UP (ref 7–14)
APPEARANCE UR: CLEAR — SIGNIFICANT CHANGE UP
BILIRUB UR-MCNC: NEGATIVE — SIGNIFICANT CHANGE UP
BUN SERPL-MCNC: 76 MG/DL — HIGH (ref 7–23)
CALCIUM SERPL-MCNC: 9.7 MG/DL — SIGNIFICANT CHANGE UP (ref 8.4–10.5)
CHLORIDE SERPL-SCNC: 94 MMOL/L — LOW (ref 98–107)
CO2 SERPL-SCNC: 31 MMOL/L — SIGNIFICANT CHANGE UP (ref 22–31)
COLOR SPEC: SIGNIFICANT CHANGE UP
CREAT SERPL-MCNC: 1.96 MG/DL — HIGH (ref 0.5–1.3)
DIFF PNL FLD: NEGATIVE — SIGNIFICANT CHANGE UP
GLUCOSE SERPL-MCNC: 162 MG/DL — HIGH (ref 70–99)
GLUCOSE UR QL: NEGATIVE — SIGNIFICANT CHANGE UP
HCT VFR BLD CALC: 29.9 % — LOW (ref 34.5–45)
HGB BLD-MCNC: 8.7 G/DL — LOW (ref 11.5–15.5)
KETONES UR-MCNC: NEGATIVE — SIGNIFICANT CHANGE UP
LEUKOCYTE ESTERASE UR-ACNC: NEGATIVE — SIGNIFICANT CHANGE UP
MAGNESIUM SERPL-MCNC: 2.7 MG/DL — HIGH (ref 1.6–2.6)
MCHC RBC-ENTMCNC: 28 PG — SIGNIFICANT CHANGE UP (ref 27–34)
MCHC RBC-ENTMCNC: 29.1 GM/DL — LOW (ref 32–36)
MCV RBC AUTO: 96.1 FL — SIGNIFICANT CHANGE UP (ref 80–100)
NITRITE UR-MCNC: NEGATIVE — SIGNIFICANT CHANGE UP
NRBC # BLD: 0 /100 WBCS — SIGNIFICANT CHANGE UP
NRBC # FLD: 0 K/UL — SIGNIFICANT CHANGE UP
PH UR: 5.5 — SIGNIFICANT CHANGE UP (ref 5–8)
PHOSPHATE SERPL-MCNC: 5 MG/DL — HIGH (ref 2.5–4.5)
PLATELET # BLD AUTO: 318 K/UL — SIGNIFICANT CHANGE UP (ref 150–400)
POTASSIUM SERPL-MCNC: 4 MMOL/L — SIGNIFICANT CHANGE UP (ref 3.5–5.3)
POTASSIUM SERPL-SCNC: 4 MMOL/L — SIGNIFICANT CHANGE UP (ref 3.5–5.3)
PROT UR-MCNC: NEGATIVE — SIGNIFICANT CHANGE UP
RBC # BLD: 3.11 M/UL — LOW (ref 3.8–5.2)
RBC # FLD: 16.4 % — HIGH (ref 10.3–14.5)
SODIUM SERPL-SCNC: 137 MMOL/L — SIGNIFICANT CHANGE UP (ref 135–145)
SP GR SPEC: 1.01 — SIGNIFICANT CHANGE UP (ref 1–1.05)
UROBILINOGEN FLD QL: SIGNIFICANT CHANGE UP
WBC # BLD: 6.73 K/UL — SIGNIFICANT CHANGE UP (ref 3.8–10.5)
WBC # FLD AUTO: 6.73 K/UL — SIGNIFICANT CHANGE UP (ref 3.8–10.5)

## 2021-09-23 RX ORDER — BUMETANIDE 0.25 MG/ML
2 INJECTION INTRAMUSCULAR; INTRAVENOUS DAILY
Refills: 0 | Status: DISCONTINUED | OUTPATIENT
Start: 2021-09-24 | End: 2021-09-24

## 2021-09-23 RX ADMIN — Medication 1 SPRAY(S): at 18:37

## 2021-09-23 RX ADMIN — BUDESONIDE AND FORMOTEROL FUMARATE DIHYDRATE 2 PUFF(S): 160; 4.5 AEROSOL RESPIRATORY (INHALATION) at 09:33

## 2021-09-23 RX ADMIN — Medication 1 SPRAY(S): at 05:07

## 2021-09-23 RX ADMIN — Medication 25 MILLIGRAM(S): at 13:40

## 2021-09-23 RX ADMIN — Medication 25 MILLIGRAM(S): at 05:06

## 2021-09-23 RX ADMIN — Medication 1 SPRAY(S): at 00:24

## 2021-09-23 RX ADMIN — Medication 325 MILLIGRAM(S): at 13:39

## 2021-09-23 RX ADMIN — Medication 1 SPRAY(S): at 13:40

## 2021-09-23 RX ADMIN — PANTOPRAZOLE SODIUM 40 MILLIGRAM(S): 20 TABLET, DELAYED RELEASE ORAL at 13:40

## 2021-09-23 RX ADMIN — BUMETANIDE 2 MILLIGRAM(S): 0.25 INJECTION INTRAMUSCULAR; INTRAVENOUS at 05:06

## 2021-09-23 RX ADMIN — Medication 25 MILLIGRAM(S): at 21:09

## 2021-09-23 RX ADMIN — Medication 25 MILLIGRAM(S): at 05:07

## 2021-09-23 RX ADMIN — POLYETHYLENE GLYCOL 3350 17 GRAM(S): 17 POWDER, FOR SOLUTION ORAL at 13:40

## 2021-09-23 RX ADMIN — BUDESONIDE AND FORMOTEROL FUMARATE DIHYDRATE 2 PUFF(S): 160; 4.5 AEROSOL RESPIRATORY (INHALATION) at 21:09

## 2021-09-23 NOTE — PROGRESS NOTE ADULT - PROBLEM SELECTOR PROBLEM 1
Acute on chronic diastolic congestive heart failure

## 2021-09-23 NOTE — PROGRESS NOTE ADULT - PROBLEM SELECTOR PROBLEM 3
Chronic kidney disease, unspecified CKD stage

## 2021-09-23 NOTE — PROGRESS NOTE ADULT - PROBLEM SELECTOR PROBLEM 5
Anemia due to chronic blood loss

## 2021-09-23 NOTE — PROGRESS NOTE ADULT - PROBLEM SELECTOR PLAN 4
-on 3 L NC at home  -no wheezing auscultated on exam  -pro air prn and symbicort, educated pt on using symbicort to prevent COPD exacerbations and hospitalizations as a result of this in the future.
-on 3 L NC at home  -no wheezing auscultated on exam  -proair prn and symbicort, educated pt on using symbicort to prevent COPD exacerbations and hospitalizations as a result of this in the future.
-on 3 L NC at home  -no wheezing auscultated on exam  -pro air prn and symbicort, educated pt on using symbicort to prevent COPD exacerbations and hospitalizations as a result of this in the future.
-on 3 L NC at home  -no wheezing auscultated on exam  -proair prn and symbicort, educated pt on using symbicort to prevent COPD exacerbations and hospitalizations as a result of this in the future.
-on 3 L NC at home  -no wheezing auscultated on exam  -pro air prn and symbicort, educated pt on using symbicort to prevent COPD exacerbations and hospitalizations as a result of this in the future.
-on 3 L NC at home  -no wheezing auscultated on exam  -pro air prn and symbicort, educated pt on using symbicort to prevent COPD exacerbations and hospitalizations as a result of this in the future.

## 2021-09-23 NOTE — PROGRESS NOTE ADULT - PROBLEM SELECTOR PROBLEM 2
MIGUEL (obstructive sleep apnea)

## 2021-09-23 NOTE — PROGRESS NOTE ADULT - PROBLEM SELECTOR PLAN 7
F-1.2 L fluid restrict  E-replete K<4 and Mag <2  N-DASH/TLC diet  SCD for DVT prophylaxis

## 2021-09-23 NOTE — PROGRESS NOTE ADULT - PROBLEM SELECTOR PLAN 1
-patient with diastolic CHF, MV replacement and TV replacement  -pulmonary vascular congestion noted on chest xray  -diuresis

## 2021-09-23 NOTE — PROGRESS NOTE ADULT - PROBLEM SELECTOR PLAN 2
-patient w/ MIGUEL unable to afford 168$ a month for machine, encouraged her to reach resources outpatient with 211  -VBG with CO2 of 70s, but pH wnl likely chronic as there is compensation

## 2021-09-23 NOTE — PROGRESS NOTE ADULT - PROBLEM SELECTOR PLAN 3
-patient with CKD with creatinine improved from prior

## 2021-09-23 NOTE — PROGRESS NOTE ADULT - PROBLEM SELECTOR PLAN 6
-afib on toprol 25 XL at home  -not on AC as above

## 2021-09-24 ENCOUNTER — TRANSCRIPTION ENCOUNTER (OUTPATIENT)
Age: 71
End: 2021-09-24

## 2021-09-24 VITALS
SYSTOLIC BLOOD PRESSURE: 138 MMHG | TEMPERATURE: 98 F | HEART RATE: 54 BPM | RESPIRATION RATE: 22 BRPM | DIASTOLIC BLOOD PRESSURE: 80 MMHG | OXYGEN SATURATION: 100 %

## 2021-09-24 LAB
ANION GAP SERPL CALC-SCNC: 7 MMOL/L — SIGNIFICANT CHANGE UP (ref 7–14)
BUN SERPL-MCNC: 86 MG/DL — HIGH (ref 7–23)
CALCIUM SERPL-MCNC: 9.9 MG/DL — SIGNIFICANT CHANGE UP (ref 8.4–10.5)
CHLORIDE SERPL-SCNC: 97 MMOL/L — LOW (ref 98–107)
CO2 SERPL-SCNC: 31 MMOL/L — SIGNIFICANT CHANGE UP (ref 22–31)
CREAT SERPL-MCNC: 1.82 MG/DL — HIGH (ref 0.5–1.3)
GLUCOSE SERPL-MCNC: 158 MG/DL — HIGH (ref 70–99)
HCT VFR BLD CALC: 29.2 % — LOW (ref 34.5–45)
HGB BLD-MCNC: 8.5 G/DL — LOW (ref 11.5–15.5)
MAGNESIUM SERPL-MCNC: 2.8 MG/DL — HIGH (ref 1.6–2.6)
MCHC RBC-ENTMCNC: 28.1 PG — SIGNIFICANT CHANGE UP (ref 27–34)
MCHC RBC-ENTMCNC: 29.1 GM/DL — LOW (ref 32–36)
MCV RBC AUTO: 96.4 FL — SIGNIFICANT CHANGE UP (ref 80–100)
NRBC # BLD: 0 /100 WBCS — SIGNIFICANT CHANGE UP
NRBC # FLD: 0 K/UL — SIGNIFICANT CHANGE UP
PHOSPHATE SERPL-MCNC: 4.3 MG/DL — SIGNIFICANT CHANGE UP (ref 2.5–4.5)
PLATELET # BLD AUTO: 303 K/UL — SIGNIFICANT CHANGE UP (ref 150–400)
POTASSIUM SERPL-MCNC: 4.1 MMOL/L — SIGNIFICANT CHANGE UP (ref 3.5–5.3)
POTASSIUM SERPL-SCNC: 4.1 MMOL/L — SIGNIFICANT CHANGE UP (ref 3.5–5.3)
RBC # BLD: 3.03 M/UL — LOW (ref 3.8–5.2)
RBC # FLD: 16.4 % — HIGH (ref 10.3–14.5)
SODIUM SERPL-SCNC: 135 MMOL/L — SIGNIFICANT CHANGE UP (ref 135–145)
WBC # BLD: 6.63 K/UL — SIGNIFICANT CHANGE UP (ref 3.8–10.5)
WBC # FLD AUTO: 6.63 K/UL — SIGNIFICANT CHANGE UP (ref 3.8–10.5)

## 2021-09-24 RX ORDER — ALLOPURINOL 300 MG
1 TABLET ORAL
Qty: 0 | Refills: 0 | DISCHARGE

## 2021-09-24 RX ORDER — BUMETANIDE 0.25 MG/ML
1 INJECTION INTRAMUSCULAR; INTRAVENOUS
Qty: 30 | Refills: 0
Start: 2021-09-24 | End: 2021-10-23

## 2021-09-24 RX ORDER — PANTOPRAZOLE SODIUM 20 MG/1
1 TABLET, DELAYED RELEASE ORAL
Qty: 30 | Refills: 0
Start: 2021-09-24 | End: 2021-10-23

## 2021-09-24 RX ORDER — BUDESONIDE AND FORMOTEROL FUMARATE DIHYDRATE 160; 4.5 UG/1; UG/1
2 AEROSOL RESPIRATORY (INHALATION)
Qty: 1 | Refills: 0
Start: 2021-09-24 | End: 2021-10-23

## 2021-09-24 RX ORDER — HYDRALAZINE HCL 50 MG
1 TABLET ORAL
Qty: 90 | Refills: 0
Start: 2021-09-24 | End: 2021-10-23

## 2021-09-24 RX ADMIN — Medication 1 SPRAY(S): at 05:06

## 2021-09-24 RX ADMIN — BUMETANIDE 2 MILLIGRAM(S): 0.25 INJECTION INTRAMUSCULAR; INTRAVENOUS at 05:28

## 2021-09-24 RX ADMIN — BUDESONIDE AND FORMOTEROL FUMARATE DIHYDRATE 2 PUFF(S): 160; 4.5 AEROSOL RESPIRATORY (INHALATION) at 09:05

## 2021-09-24 RX ADMIN — Medication 1 SPRAY(S): at 12:48

## 2021-09-24 RX ADMIN — Medication 25 MILLIGRAM(S): at 05:06

## 2021-09-24 RX ADMIN — Medication 25 MILLIGRAM(S): at 12:45

## 2021-09-24 RX ADMIN — Medication 325 MILLIGRAM(S): at 12:45

## 2021-09-24 NOTE — PROGRESS NOTE ADULT - REASON FOR ADMISSION
SOB, CHF exacerbation
Mildly to Moderately Impaired: difficulty hearing in some environments or speaker may need to increase volume or speak distinctly

## 2021-09-24 NOTE — DISCHARGE NOTE PROVIDER - NSDCCPCAREPLAN_GEN_ALL_CORE_FT
PRINCIPAL DISCHARGE DIAGNOSIS  Diagnosis: Acute decompensated heart failure  Assessment and Plan of Treatment: Continue Bumex and metoprolol as prescribed. Follow up with your cardiologist and PCP in 1 week for re-eval and further management.      SECONDARY DISCHARGE DIAGNOSES  Diagnosis: COPD, moderate  Assessment and Plan of Treatment: Continue Spiriva and albuterol as directed. Follow up with PCP in 1 week for re-eval. It is recommended you also see a pulmonologist for your COPD and pulmonary hypertension.    Diagnosis: Chronic atrial fibrillation  Assessment and Plan of Treatment: Continue Metoprolol. Follow up with PCP and cardiology    Diagnosis: Chronic kidney disease, unspecified CKD stage  Assessment and Plan of Treatment: Stable. Follow up with PCP for repeat BMP in 1 week.

## 2021-09-24 NOTE — DISCHARGE NOTE NURSING/CASE MANAGEMENT/SOCIAL WORK - NSSCNAMETXT_GEN_ALL_CORE
[facility]. Nurse to visit on the day following discharge. Other appropriate services to be arranged thereafter.   Please contact the home care agency at the above phone number if you have not heard from them by approximately 12 noon on the day after your hospital discharge.  [facility] nurse will call prior to the home visit.

## 2021-09-24 NOTE — DISCHARGE NOTE PROVIDER - HOSPITAL COURSE
Acute on chronic diastolic congestive heart failure.   -patient with diastolic CHF, MV replacement and TV replacement  -pulmonary vascular congestion noted on chest xray  -is on bumex 2 mg QD, can do lasix 40 mg iv TID for now and monitor patients urine output  -strict i/os  -1.2 L fluid restriction  -echocardiogram.    MIGUEL (obstructive sleep apnea).   -patient w/ MIGUEL unable to afford 168$ a month for machine, encouraged her to reach resources outpatient with 211  -VBG with CO2 of 70s, but pH wnl likely chronic as there is compensation.    Chronic kidney disease, unspecified CKD stage.   -patient with CKD with creatinine improved from prior.    COPD, moderate.   -on 3 L NC at home  -no wheezing auscultated on exam  -proair prn and symbicort, educated pt on using symbicort to prevent COPD exacerbations and hospitalizations as a result of this in the future.    Anemia due to chronic blood loss.   -SEGUNDO FOBT positive last admission unable to do c-scope or endoscopy given comorbidities  -has afib, cannot AC as SEGUNDO  -iron supplementation  -protonix QD.    Chronic atrial fibrillation.   -afib on toprol 25 XL at home  -not on AC as above.    SCD for DVT prophylaxis.

## 2021-09-24 NOTE — PROGRESS NOTE ADULT - ASSESSMENT
71 year old female h/o HTN, CHF presents with SOB. Nephrology consulted for elevated Scr.    1. CKD-3b: Baseline Scr 1.4-1.6. Renal function at baseline. Outpatient CKD work up. Avoid nephrotoxins. Monitor electrolytes.  2. HTN with CKD: BP controlled. Continue with current anti-hypertensive medications. Monitor BP.  3. LE edema: With suboptimal UO. Continue with lasix 40 mg IV TID. Monitor UO.   4. Acute on Chronic Diastolic HF: Diuretics as above. TTE with severe pulmonary HTN. Consider pulmonary consultation.  5. Hypercalcemia: Mild. Check iPTH. Further work up pending results. Monitor serum calcium.    [facility] NEPHROLOGY  Rodrigue Amador M.D.  Rob Perez D.O.  Ana Song M.D.  Lucrecia López, MSN, ANP-C    Telephone: [phone]  Facsimile: [phone]    [address] [place] [address]      
Patient is a 71 year old hx of HTN, dCHF, MV and TV replacement, MIGUEL not on CPAP due to unable to afford, afib s/p ablation not on AC due to chronic bleeding, unable to do c-scope or endoscopy due to medical conditions, COPD on 3 L NC, gout, who presents due to acute sob. 
1. CKD3b- likely due to CHF. Renal fxn stable. Continue diuresis.  2. Acute on Chronic Diastolic HF- continue aggressive diuresis. Medications per cardiology.  3. HTN- BP controlled. Continue with current anti-hypertensive medications. Monitor BP.  4. LE edema- due to HF. Plan as above.
71 year old female h/o HTN, CHF presents with SOB. Nephrology consulted for elevated Scr.    1. MISAEL: Suspect due to diuresis as patient appears euvolemic. Scr appears to be plateauing. UA bland. Bladder scan results not found? Avoid nephrotoxins.  2. CKD-3b: Baseline Scr 1.4-1.6. Outpatient CKD work up. Monitor electrolytes.  3. HTN with CKD: BP improving. Continue with current medications. Monitor BP.  4. LE edema: Resolved. Suspect current dyspnea not due to volume overload. Can increase bumex back to 2 mg PO twice daily (home dose) on 9/25. Monitor UO.   5. Acute on Chronic Diastolic HF: Diuretics as above. TTE with severe pulmonary HTN. Consider pulmonary consultation.  6. Hypercalcemia: With inappropriately elevated iPTH suggestive of primary HPT. Will consider sensipar if serum calcium increases. Monitor serum calcium.      [facility] NEPHROLOGY  Rodrigue Amador M.D.  Rob Perez D.O.  Ana Song M.D.  Lucrecia López, MSN, ANP-C    Telephone: [phone]  Facsimile: [phone]    [address] [place] [id number]      
1. CKD3b- likely due to CHF. Renal fxn stable. Continue diuresis.  2. Acute on Chronic Diastolic HF- continue aggressive diuresis. Medications per cardiology.  3. HTN- BP controlled. Continue with current anti-hypertensive medications. Monitor BP.  4. LE edema- due to HF. Plan as above.
71 year old female h/o HTN, CHF presents with SOB. Nephrology consulted for elevated Scr.    1. CKD-3b: Baseline Scr 1.4-1.6. Scr mildly increased today but remains near baseline. Outpatient CKD work up. Avoid nephrotoxins. Monitor electrolytes.  2. HTN with CKD: BP uncontrolled. Start hydralazine 25 mg TID. Monitor BP.  3. LE edema: Improving. Change lasix to bumex 2 mg PO twice daily. Monitor UO.   4. Acute on Chronic Diastolic HF: Diuretics as above. TTE with severe pulmonary HTN. Consider pulmonary consultation.  5. Hypercalcemia: With inappropriately elevated iPTH suggestive of primary HPT. Will consider sensipar if serum calcium increases. Monitor serum calcium.    [facility] NEPHROLOGY  Rodrigue Amador M.D.  Rob Perez D.O.  Ana Song M.D.  Lucrecia López, MSN, ANP-C    Telephone: [phone]  Facsimile: [phone]    [address] [place] [address]      
71 year old female h/o HTN, CHF presents with SOB. Nephrology consulted for elevated Scr.    1. CKD-3b: Baseline Scr 1.4-1.6. Renal function at baseline. Outpatient CKD work up. Avoid nephrotoxins. Monitor electrolytes.  2. HTN with CKD: BP improving. Continue with current medications. Monitor BP.  3. LE edema: Improving. Continue with bumex 2 mg PO twice daily. Monitor UO.   4. Acute on Chronic Diastolic HF: Diuretics as above. TTE with severe pulmonary HTN. Consider pulmonary consultation.  5. Hypercalcemia: With inappropriately elevated iPTH suggestive of primary HPT. Will consider sensipar if serum calcium increases. Monitor serum calcium.    [facility] NEPHROLOGY  Rodrigue Amador M.D.  Rob Perez D.O.  Ana Song M.D.  Lucrecia López, MSN, ANP-C    Telephone: [phone]  Facsimile: [phone]    [address] [place] [address]      
71 year old female h/o HTN, CHF presents with SOB. Nephrology consulted for elevated Scr.    1. MISAEL: Suspect due to diuresis as patient appears euvolemic. Check UA, urine urea and urine creatinine. Check bladder scan. Decrease bumex to daily dosing. Avoid nephrotoxins.  2. CKD-3b: Baseline Scr 1.4-1.6. Outpatient CKD work up. Monitor electrolytes.  3. HTN with CKD: BP improving. Continue with current medications. Monitor BP.  4. LE edema: Resolved. Decrease bumex to 2 mg daily. Monitor UO.   5. Acute on Chronic Diastolic HF: Diuretics as above. TTE with severe pulmonary HTN. Consider pulmonary consultation.  6. Hypercalcemia: With inappropriately elevated iPTH suggestive of primary HPT. Will consider sensipar if serum calcium increases. Monitor serum calcium.    [facility] NEPHROLOGY  Rodrigue Amador M.D.  Rob Perez D.O.  Ana Song M.D.  Lucrecia López, MSN, ANP-C    Telephone: [phone]  Facsimile: [phone]    [address] [place] [address]      
Patient is a 71 year old hx of HTN, dCHF, MV and TV replacement, MIGUEL not on CPAP due to unable to afford, afib s/p ablation not on AC due to chronic bleeding, unable to do c-scope or endoscopy due to medical conditions, COPD on 3 L NC, gout, who presents due to acute sob. 

## 2021-09-24 NOTE — DISCHARGE NOTE NURSING/CASE MANAGEMENT/SOCIAL WORK - PATIENT PORTAL LINK FT
You can access the FollowMyHealth Patient Portal offered by [facility] by registering at the following website: [email or web address]. By joining [employer]’s FollowMyHealth portal, you will also be able to view your health information using other applications (apps) compatible with our system.

## 2021-09-24 NOTE — PROGRESS NOTE ADULT - SUBJECTIVE AND OBJECTIVE BOX
DATE OF SERVICE:  9/18/2021  Patient was seen and examined on 09/18/2021    .Interim events noted.Consultant notes ,Labs,Telemetry reviewed by me    PRESENTING CC:Dyspnea    HPI and HOSPITAL COURSE: HPI:  Patient is a 71 year old hx of HTN, dCHF, MV and TV replacement, MIGUEL not on CPAP due to unable to afford, afib s/p ablation not on AC due to chronic bleeding, unable to do c-scope or endoscopy due to medical conditions, COPD on 3 L NC, gout, who presents due to acute sob. She has shortness of breath at baseline, but states over the past few days this is worse. She states that this feels worse than usual, but feels similar to her prior CHF exacerbations. She states drinking more fluids than usual. Tried albuterol inhaler at home every 4 hours without relief of symptoms. She denies cp or palpitations. Does have bilateral lower extremity swelling equal on both sides. Unable to obtain sleep machine due to cost, discussed to her [employer] services if she needs help with food or utilities. Denies fevers, chills, nausea, vomiting, diarrhea, LOC, visual changes. Does have epistaxis occasionally due to nasal cannula use.     ED course: afebrile, HR 52-62, /68, RR 24 98% on 3 L nasal cannula. troponin negative times 2  Chest xray with bilateral pulmonary vascular congestion  40 mg iv lasix given        (16 Sep 2021 03:28)      INTERIM EVENTS:Awake alert still dyspneac      PMH -reviewed admission note, no change since admission  Heart Failure: Acute [ ] Chronic [ ] Acute on Chronic [ ] Diastolic [ ] Systolic [ ] Combined Systolic and Diastolic[ ]  MISAEL[ ]  ATN[ ]  CKD I [ ] CKDII [ ] CKD III [ ] CKD IV [ ] CKD V [ ] ESRD[ ]  HTN[ ] CVA[ ] DM[ ] COPD[ ] COVID[ ] AF[ ]  PPM[ ] ICD[ ]    MEDICATIONS  (STANDING):  budesonide 160 MICROgram(s)/formoterol 4.5 MICROgram(s) Inhaler 2 Puff(s) Inhalation two times a day  ferrous    sulfate 325 milliGRAM(s) Oral daily  furosemide   Injectable 40 milliGRAM(s) IV Push every 8 hours  metoprolol succinate ER 25 milliGRAM(s) Oral daily  oxymetazoline 0.05% Nasal Spray 2 Spray(s) Both Nostrils two times a day  pantoprazole   Suspension 40 milliGRAM(s) Oral daily  polyethylene glycol 3350 17 Gram(s) Oral daily  sodium chloride 0.65% Nasal 1 Spray(s) Both Nostrils every 6 hours    MEDICATIONS  (PRN):  acetaminophen   Tablet .. 650 milliGRAM(s) Oral every 6 hours PRN Temp greater or equal to 38C (100.4F), Mild Pain (1 - 3)  ALBUTerol    90 MICROgram(s) HFA Inhaler 2 Puff(s) Inhalation every 6 hours PRN Shortness of Breath and/or Wheezing            REVIEW OF SYSTEMS:  Constitutional: [ ] fever, [ ]weight loss,  [ ]fatigue  Eyes: [ ] visual changes  Respiratory: [ ]shortness of breath;  [ ] cough, [ ]wheezing, [ ]chills, [ ]hemoptysis  Cardiovascular: [ ] chest pain, [ ]palpitations, [ ]dizziness,  [ ]leg swelling[ ]orthopnea[ ]PND  Gastrointestinal: [ ] abdominal pain, [ ]nausea, [ ]vomiting,  [ ]diarrhea [ ]Constipation [ ]Melena  Genitourinary: [ ] dysuria, [ ] hematuria [ ]Junior  Neurologic: [ ] headaches [ ] tremors[ ]weakness [ ]Paralysis Right[ ] Left[ ]  Skin: [ ] itching, [ ]burning, [ ] rashes  Endocrine: [ ] heat or cold intolerance  Musculoskeletal: [ ] joint pain or swelling; [ ] muscle, back, or extremity pain  Psychiatric: [ ] depression, [ ]anxiety, [ ]mood swings, or [ ]difficulty sleeping  Hematologic: [ ] easy bruising, [ ] bleeding gums    [x] All remaining systems negative except as per above.   [ ]Unable to obtain.    Vital Signs Last 24 Hrs  T(C): 36.7 (18 Sep 2021 12:00), Max: 36.8 (17 Sep 2021 16:37)  T(F): 98.1 (18 Sep 2021 12:00), Max: 98.3 (18 Sep 2021 00:36)  HR: 64 (18 Sep 2021 12:00) (59 - 71)  BP: 139/75 (18 Sep 2021 12:00) (120/52 - 139/75)  BP(mean): --  RR: 22 (18 Sep 2021 12:00) (18 - 22)  SpO2: 100% (18 Sep 2021 12:00) (95% - 100%)  I&O's Summary    17 Sep 2021 07:01  -  18 Sep 2021 07:00  --------------------------------------------------------  IN: 420 mL / OUT: 1400 mL / NET: -980 mL        PHYSICAL EXAM:  General: No acute distress BMI-  HEENT: EOMI, PERRL  Neck: Supple, [ ] JVD  Lungs: Equal air entry bilaterally; [ ] rales [ ] wheezing [ ] rhonchi  Heart: Regular rate and rhythm; [ ] murmur   /6 [ ] systolic [ ] diastolic [ ] radiation[ ] rubs [ ]  gallops  Abdomen: Nontender, bowel sounds present  Extremities: No clubbing, cyanosis, [ ] edema [ ]Pulses  equal and intact  Nervous system:  Alert & Oriented X3, no focal deficits  Psychiatric: Normal affect  Skin: No rashes or lesions    LABS:  09-18    138  |  96<L>  |  58<H>  ----------------------------<  108<H>  4.6   |  35<H>  |  1.52<H>    Ca    10.2      18 Sep 2021 07:23  Phos  3.8     09-18  Mg     2.20     09-18      Creatinine Trend: 1.52<--, 1.48<--, 1.58<--, 1.48<--                        8.9    6.51  )-----------( 248      ( 18 Sep 2021 07:23 )             30.4         Cardiac Enzymes:           RADIOLOGY:    ECG [my interpretation]:    TELEMETRY:Reviewed monitor tracings-    ECHO:    STRESS TEST:    CATHETERIZATION:      IMPRESSION AND PLAN:      
[facility] NEPHROLOGY- PROGRESS NOTE    71 year old female h/o HTN, CHF presents with SOB. Nephrology consulted for elevated Scr.    REVIEW OF SYSTEMS:  Gen: no changes in weight  Cards: no chest pain  Resp: + dyspnea improving  GI: no nausea or vomiting or diarrhea  Vascular: + LE edema improving    No Known Allergies      Hospital Medications: Medications reviewed      VITALS:  T(F): 98.3 (21 @ 09:24), Max: 98.3 (21 @ 21:12)  HR: 54 (21 @ 09:24)  BP: 138/80 (21 @ 09:24)  RR: 22 (21 @ 09:24)  SpO2: 100% (21 @ 09:24)  Wt(kg): --     @ 07:01  -   @ 07:00  --------------------------------------------------------  IN: 0 mL / OUT: 700 mL / NET: -700 mL        PHYSICAL EXAM:    Gen: NAD, calm  Cards: RRR, +S1/S2, no M/G/R  Resp: CTA B/L  GI: soft, NT/ND, NABS  Vascular: no LE edema B/L      LABS:      135  |  97<L>  |  86<H>  ----------------------------<  158<H>  4.1   |  31  |  1.82<H>    Ca    9.9      24 Sep 2021 07:04  Phos  4.3       Mg     2.80           Creatinine Trend: 1.82 <--, 1.96 <--, 1.55 <--, 1.76 <--, 1.55 <--, 1.55 <--, 1.52 <--                        8.5    6.63  )-----------( 303      ( 24 Sep 2021 07:04 )             29.2     Urine Studies:  Urinalysis Basic - ( 23 Sep 2021 20:24 )    Color: Light Yellow / Appearance: Clear / S.015 / pH:   Gluc:  / Ketone: Negative  / Bili: Negative / Urobili: <2 mg/dL   Blood:  / Protein: Negative / Nitrite: Negative   Leuk Esterase: Negative / RBC:  / WBC    Sq Epi:  / Non Sq Epi:  / Bacteria:                 
[facility] NEPHROLOGY- PROGRESS NOTE    Patient is a 71y Female with diastolic HF and s/p MV and TV replacement who presented to the hospital with SOB and was found to have decompensated HF.  Pt also with MIGUEL supposed to be on CPAP, but currently not.  Pt found to have elevated creatinine. She reports that she has mild kidney disease that is managed by her PMD.    Pt reports her SOB is improving as is her LE edema- she feels much better today          REVIEW OF SYSTEMS:  CONSTITUTIONAL: + weakness, no fevers or chills  EYES/ENT: No visual changes;  No vertigo or throat pain. +epistaxis.   NECK: No pain or stiffness  RESPIRATORY: +cough, +wheezing, no hemoptysis; + shortness of breath improving significantly  CARDIOVASCULAR: No chest pain or palpitations.  GASTROINTESTINAL: No abdominal or epigastric pain. No nausea, vomiting, or hematemesis; No diarrhea or constipation. No melena or hematochezia.  GENITOURINARY: No dysuria, frequency, foamy urine, urinary urgency, incontinence or hematuria  NEUROLOGICAL: No numbness or weakness  SKIN: No itching, burning, rashes, or lesions   VASCULAR: + bilateral lower extremity edema.   All other review of systems is negative unless indicated above.    VITALS:  T(F): 98.6 (09-18-21 @ 16:02), Max: 98.6 (09-18-21 @ 16:02)  HR: 70 (09-18-21 @ 16:02)  BP: 153/67 (09-18-21 @ 16:02)  RR: 22 (09-18-21 @ 16:02)  SpO2: 97% (09-18-21 @ 16:02)  Wt(kg): --    09-17 @ 07:01  -  09-18 @ 07:00  --------------------------------------------------------  IN: 420 mL / OUT: 1400 mL / NET: -980 mL    PHYSICAL EXAM:  Constitutional: uncomfortable appearing, severe obesity  HEENT: anicteric sclera, oropharynx clear, MMM  Neck: No JVD  Respiratory: Decreased BS B bases  Cardiovascular: S1, S2, RRR  Gastrointestinal: BS+, soft, NT/ND  Extremities: No cyanosis or clubbing. + mild B LE edema  Neurological: A/O x 3, no focal deficits  Psychiatric: Normal mood, normal affect  : No CVA tenderness. No sarmiento.   Skin: No rashes      LABS:  09-18    138  |  96<L>  |  58<H>  ----------------------------<  108<H>  4.6   |  35<H>  |  1.52<H>    Ca    10.2      18 Sep 2021 07:23  Phos  3.8     09-18  Mg     2.20     09-18      Creatinine Trend: 1.52 <--, 1.48 <--, 1.58 <--, 1.48 <--                        8.9    6.51  )-----------( 248      ( 18 Sep 2021 07:23 )             30.4     Urine Studies:      
[facility] NEPHROLOGY- PROGRESS NOTE    Patient is a 71y Female with diastolic HF and s/p MV and TV replacement who presented to the hospital with SOB and was found to have decompensated HF.  Pt also with MIGUEL supposed to be on CPAP, but currently not.  Pt found to have elevated creatinine. She reports that she has mild kidney disease that is managed by her PMD.    Pt reports her SOB is improving as is her LE edema.           REVIEW OF SYSTEMS:  CONSTITUTIONAL: + weakness, no fevers or chills  EYES/ENT: No visual changes;  No vertigo or throat pain. +epistaxis.   NECK: No pain or stiffness  RESPIRATORY: +cough, +wheezing, no hemoptysis; + shortness of breath improving significantly  CARDIOVASCULAR: No chest pain or palpitations.  GASTROINTESTINAL: No abdominal or epigastric pain. No nausea, vomiting, or hematemesis; No diarrhea or constipation. No melena or hematochezia.  GENITOURINARY: No dysuria, frequency, foamy urine, urinary urgency, incontinence or hematuria  NEUROLOGICAL: No numbness or weakness  SKIN: No itching, burning, rashes, or lesions   VASCULAR: + bilateral lower extremity edema.   All other review of systems is negative unless indicated above.    VITALS:  T(F): 97.5 (09-19-21 @ 17:02), Max: 98.5 (09-18-21 @ 21:31)  HR: 66 (09-19-21 @ 17:02)  BP: 163/76 (09-19-21 @ 17:02)  RR: 18 (09-19-21 @ 17:02)  SpO2: 95% (09-19-21 @ 17:02)  Wt(kg): --    09-18 @ 07:01  -  09-19 @ 07:00  --------------------------------------------------------  IN: 0 mL / OUT: 1500 mL / NET: -1500 mL      PHYSICAL EXAM:  Constitutional: uncomfortable appearing, severe obesity  HEENT: anicteric sclera, oropharynx clear, MMM  Neck: No JVD  Respiratory: Decreased BS B bases  Cardiovascular: S1, S2, RRR  Gastrointestinal: BS+, soft, NT/ND  Extremities: No cyanosis or clubbing. + mild B LE edema  Neurological: A/O x 3, no focal deficits  Psychiatric: Normal mood, normal affect  : No CVA tenderness. No sarmiento.   Skin: No rashes    LABS:  09-19    138  |  92<L>  |  64<H>  ----------------------------<  99  4.5   |  38<H>  |  1.55<H>    Ca    10.6<H>      19 Sep 2021 07:13  Phos  3.3     09-19  Mg     2.40     09-19      Creatinine Trend: 1.55 <--, 1.52 <--, 1.48 <--, 1.58 <--, 1.48 <--                        9.4    7.53  )-----------( 282      ( 19 Sep 2021 07:13 )             31.3       
pt was evaluated by hospitalist earlier during the day   labs, vitals reviewed
[facility] NEPHROLOGY- PROGRESS NOTE    71 year old female h/o HTN, CHF presents with SOB. Nephrology consulted for elevated Scr.    REVIEW OF SYSTEMS:  Gen: no changes in weight  Cards: no chest pain  Resp: + dyspnea improving  GI: no nausea or vomiting or diarrhea  Vascular: + LE edema improving    No Known Allergies      Hospital Medications: Medications reviewed      VITALS:  T(F): 98 (09-22-21 @ 05:00), Max: 98.5 (09-21-21 @ 20:45)  HR: 63 (09-22-21 @ 05:00)  BP: 135/71 (09-22-21 @ 05:00)  RR: 18 (09-22-21 @ 05:00)  SpO2: 98% (09-22-21 @ 05:00)  Wt(kg): --    09-21 @ 07:01  -  09-22 @ 07:00  --------------------------------------------------------  IN: 75 mL / OUT: 650 mL / NET: -575 mL        PHYSICAL EXAM:    Gen: NAD, calm  Cards: RRR, +S1/S2, no M/G/R  Resp: CTA B/L  GI: soft, NT/ND, NABS  Vascular: no LE edema B/L      LABS:  09-22    142  |  91<L>  |  64<H>  ----------------------------<  120<H>  4.4   |  31  |  1.55<H>    Ca    10.3      22 Sep 2021 07:28  Phos  4.7     09-22  Mg     2.70     09-22    TPro  8.7<H>  /  Alb  4.0  /  TBili  0.4  /  DBili      /  AST  17  /  ALT  8   /  AlkPhos  119  09-21    Creatinine Trend: 1.55 <--, 1.76 <--, 1.55 <--, 1.55 <--, 1.52 <--, 1.48 <--, 1.58 <--, 1.48 <--                        9.2    7.06  )-----------( 305      ( 22 Sep 2021 07:28 )             31.3     Urine Studies:            
[facility] NEPHROLOGY- PROGRESS NOTE    71 year old female h/o HTN, CHF presents with SOB. Nephrology consulted for elevated Scr.    REVIEW OF SYSTEMS:  Gen: no changes in weight  Cards: no chest pain  Resp: + dyspnea  GI: no nausea or vomiting or diarrhea  Vascular: + LE edema improving    No Known Allergies      Hospital Medications: Medications reviewed    VITALS:  T(F): 98.1 (09-20-21 @ 06:07), Max: 98.4 (09-20-21 @ 02:45)  HR: 76 (09-20-21 @ 06:07)  BP: 141/65 (09-20-21 @ 06:07)  RR: 17 (09-20-21 @ 06:07)  SpO2: 97% (09-20-21 @ 06:07)  Wt(kg): --  Height (cm): 162.6 (09-15 @ 20:21)    09-19 @ 07:01  -  09-20 @ 07:00  --------------------------------------------------------  IN: 335 mL / OUT: 1400 mL / NET: -1065 mL        PHYSICAL EXAM:    Gen: NAD, calm  Cards: RRR, +S1/S2, no M/G/R  Resp: CTA B/L  GI: soft, NT/ND, NABS  Vascular: trace LE edema B/L    LABS:  09-20    137  |  95<L>  |  62<H>  ----------------------------<  116<H>  4.5   |  37<H>  |  1.55<H>    Ca    10.7<H>      20 Sep 2021 07:26  Phos  3.2     09-20  Mg     2.30     09-20      Creatinine Trend: 1.55 <--, 1.55 <--, 1.52 <--, 1.48 <--, 1.58 <--, 1.48 <--                        9.3    7.72  )-----------( 292      ( 20 Sep 2021 07:26 )             31.1     Urine Studies:        RADIOLOGY & ADDITIONAL STUDIES:
[facility] NEPHROLOGY- PROGRESS NOTE    71 year old female h/o HTN, CHF presents with SOB. Nephrology consulted for elevated Scr.    REVIEW OF SYSTEMS:  Gen: no changes in weight  Cards: no chest pain  Resp: + dyspnea improving  GI: no nausea or vomiting or diarrhea  Vascular: + LE edema improving    No Known Allergies      Hospital Medications: Medications reviewed      VITALS:  T(F): 97.6 (09-23-21 @ 13:52), Max: 98.2 (09-23-21 @ 05:10)  HR: 63 (09-23-21 @ 13:52)  BP: 104/48 (09-23-21 @ 13:52)  RR: 21 (09-23-21 @ 13:52)  SpO2: 100% (09-23-21 @ 13:52)  Wt(kg): --    09-22 @ 07:01  -  09-23 @ 07:00  --------------------------------------------------------  IN: 470 mL / OUT: 1250 mL / NET: -780 mL    09-23 @ 07:01  -  09-23 @ 15:19  --------------------------------------------------------  IN: 0 mL / OUT: 500 mL / NET: -500 mL        PHYSICAL EXAM:    Gen: NAD, calm  Cards: RRR, +S1/S2, no M/G/R  Resp: CTA B/L  GI: soft, NT/ND, NABS  Vascular: no LE edema B/L      LABS:  09-23    137  |  94<L>  |  76<H>  ----------------------------<  162<H>  4.0   |  31  |  1.96<H>    Ca    9.7      23 Sep 2021 07:36  Phos  5.0     09-23  Mg     2.70     09-23      Creatinine Trend: 1.96 <--, 1.55 <--, 1.76 <--, 1.55 <--, 1.55 <--, 1.52 <--, 1.48 <--                        8.7    6.73  )-----------( 318      ( 23 Sep 2021 07:36 )             29.9     Urine Studies:            
[facility] NEPHROLOGY- PROGRESS NOTE    71 year old female h/o HTN, CHF presents with SOB. Nephrology consulted for elevated Scr.    REVIEW OF SYSTEMS:  Gen: no changes in weight  Cards: no chest pain  Resp: + dyspnea  GI: no nausea or vomiting or diarrhea  Vascular: + LE edema improving    No Known Allergies      Hospital Medications: Medications reviewed      VITALS:  T(F): 97.6 (09-21-21 @ 08:24), Max: 98.3 (09-20-21 @ 20:22)  HR: 67 (09-21-21 @ 08:24)  BP: 158/88 (09-21-21 @ 08:24)  RR: 17 (09-21-21 @ 08:24)  SpO2: 93% (09-21-21 @ 08:24)  Wt(kg): --    09-20 @ 07:01  -  09-21 @ 07:00  --------------------------------------------------------  IN: 100 mL / OUT: 900 mL / NET: -800 mL    09-21 @ 07:01  -  09-21 @ 15:34  --------------------------------------------------------  IN: 0 mL / OUT: 450 mL / NET: -450 mL        PHYSICAL EXAM:    Gen: NAD, calm  Cards: RRR, +S1/S2, no M/G/R  Resp: CTA B/L  GI: soft, NT/ND, NABS  Vascular: no LE edema B/L      LABS:  09-21    137  |  93<L>  |  64<H>  ----------------------------<  152<H>  4.0   |  35<H>  |  1.76<H>    Ca    10.5      21 Sep 2021 05:21  Phos  4.2     09-21  Mg     2.60     09-21    TPro  8.7<H>  /  Alb  4.0  /  TBili  0.4  /  DBili      /  AST  17  /  ALT  8   /  AlkPhos  119  09-21    Creatinine Trend: 1.76 <--, 1.55 <--, 1.55 <--, 1.52 <--, 1.48 <--, 1.58 <--, 1.48 <--                        9.1    7.81  )-----------( 302      ( 21 Sep 2021 05:21 )             30.8     Urine Studies:      
    SUBJECTIVE / OVERNIGHT EVENTS: pt seen and examined    MEDICATIONS  (STANDING):  budesonide 160 MICROgram(s)/formoterol 4.5 MICROgram(s) Inhaler 2 Puff(s) Inhalation two times a day  buMETAnide 2 milliGRAM(s) Oral two times a day  ferrous    sulfate 325 milliGRAM(s) Oral daily  hydrALAZINE 25 milliGRAM(s) Oral three times a day  metoprolol succinate ER 25 milliGRAM(s) Oral daily  pantoprazole   Suspension 40 milliGRAM(s) Oral daily  polyethylene glycol 3350 17 Gram(s) Oral daily  sodium chloride 0.65% Nasal 1 Spray(s) Both Nostrils every 6 hours    MEDICATIONS  (PRN):  acetaminophen   Tablet .. 650 milliGRAM(s) Oral every 6 hours PRN Temp greater or equal to 38C (100.4F), Mild Pain (1 - 3)  ALBUTerol    90 MICROgram(s) HFA Inhaler 2 Puff(s) Inhalation every 6 hours PRN Shortness of Breath and/or Wheezing  aluminum hydroxide/magnesium hydroxide/simethicone Suspension 30 milliLiter(s) Oral every 4 hours PRN Dyspepsia    Vital Signs Last 24 Hrs  T(C): 36.9 (09-21-21 @ 20:45), Max: 36.9 (09-21-21 @ 20:45)  T(F): 98.5 (09-21-21 @ 20:45), Max: 98.5 (09-21-21 @ 20:45)  HR: 65 (09-21-21 @ 20:45) (65 - 69)  BP: 127/60 (09-21-21 @ 20:45) (127/60 - 158/88)  BP(mean): --  RR: 18 (09-21-21 @ 20:45) (16 - 18)  SpO2: 100% (09-21-21 @ 20:45) (93% - 100%)    Constitutional: No fever, fatigue  Skin: No rash.  Eyes: No recent vision problems or eye pain.  ENT: No congestion, ear pain, or sore throat.  Cardiovascular: No chest pain or palpation.  Respiratory: No cough, shortness of breath, congestion, or wheezing.  Gastrointestinal: No abdominal pain, nausea, vomiting, or diarrhea.  Genitourinary: No dysuria.  Musculoskeletal: No joint swelling.  Neurologic: No headache.    PHYSICAL EXAM:  GENERAL: NAD  EYES: EOMI, PERRLA  NECK: Supple, No JVD  CHEST/LUNG: dec breath sounds at bases  HEART:  S1 , S2 +  ABDOMEN: soft , bs+  EXTREMITIES: + edema   NEUROLOGY:alert awake      LABS:  09-21    137  |  93<L>  |  64<H>  ----------------------------<  152<H>  4.0   |  35<H>  |  1.76<H>    Ca    10.5      21 Sep 2021 05:21  Phos  4.2     09-21  Mg     2.60     09-21    TPro  8.7<H>  /  Alb  4.0  /  TBili  0.4  /  DBili      /  AST  17  /  ALT  8   /  AlkPhos  119  09-21    Creatinine Trend: 1.76 <--, 1.55 <--, 1.55 <--, 1.52 <--, 1.48 <--, 1.58 <--, 1.48 <--                        9.1    7.81  )-----------( 302      ( 21 Sep 2021 05:21 )             30.8     Urine Studies:            LIVER FUNCTIONS - ( 21 Sep 2021 05:21 )  Alb: 4.0 g/dL / Pro: 8.7 g/dL / ALK PHOS: 119 U/L / ALT: 8 U/L / AST: 17 U/L / GGT: x             
    SUBJECTIVE / OVERNIGHT EVENTS: pt seen and examined      MEDICATIONS  (STANDING):  budesonide 160 MICROgram(s)/formoterol 4.5 MICROgram(s) Inhaler 2 Puff(s) Inhalation two times a day  ferrous    sulfate 325 milliGRAM(s) Oral daily  furosemide   Injectable 40 milliGRAM(s) IV Push every 8 hours  metoprolol succinate ER 25 milliGRAM(s) Oral daily  oxymetazoline 0.05% Nasal Spray 2 Spray(s) Both Nostrils two times a day  pantoprazole   Suspension 40 milliGRAM(s) Oral daily  polyethylene glycol 3350 17 Gram(s) Oral daily  sodium chloride 0.65% Nasal 1 Spray(s) Both Nostrils every 6 hours    MEDICATIONS  (PRN):  acetaminophen   Tablet .. 650 milliGRAM(s) Oral every 6 hours PRN Temp greater or equal to 38C (100.4F), Mild Pain (1 - 3)  ALBUTerol    90 MICROgram(s) HFA Inhaler 2 Puff(s) Inhalation every 6 hours PRN Shortness of Breath and/or Wheezing    Vital Signs Last 24 Hrs  T(C): 36.9 (09-18-21 @ 21:31), Max: 37 (09-18-21 @ 16:02)  T(F): 98.5 (09-18-21 @ 21:31), Max: 98.6 (09-18-21 @ 16:02)  HR: 60 (09-18-21 @ 21:31) (59 - 70)  BP: 133/54 (09-18-21 @ 21:31) (120/52 - 153/67)  BP(mean): --  RR: 18 (09-18-21 @ 21:31) (18 - 22)  SpO2: 94% (09-18-21 @ 21:31) (94% - 100%)        CAPILLARY BLOOD GLUCOSE        I&O's Summary    17 Sep 2021 07:01  -  18 Sep 2021 07:00  --------------------------------------------------------  IN: 420 mL / OUT: 1400 mL / NET: -980 mL    18 Sep 2021 07:01  -  18 Sep 2021 23:35  --------------------------------------------------------  IN: 0 mL / OUT: 800 mL / NET: -800 mL        Constitutional: No fever, fatigue  Skin: No rash.  Eyes: No recent vision problems or eye pain.  ENT: No congestion, ear pain, or sore throat.  Cardiovascular: No chest pain or palpation.  Respiratory: No cough, shortness of breath, congestion, or wheezing.  Gastrointestinal: No abdominal pain, nausea, vomiting, or diarrhea.  Genitourinary: No dysuria.  Musculoskeletal: No joint swelling.  Neurologic: No headache.    PHYSICAL EXAM:  GENERAL: NAD  EYES: EOMI, PERRLA  NECK: Supple, No JVD  CHEST/LUNG: dec breath sounds at bases  HEART:  S1 , S2 +  ABDOMEN: soft , bs+  EXTREMITIES: + edema   NEUROLOGY:alert awake      LABS:                        8.9    6.51  )-----------( 248      ( 18 Sep 2021 07:23 )             30.4     09-18    138  |  96<L>  |  58<H>  ----------------------------<  108<H>  4.6   |  35<H>  |  1.52<H>    Ca    10.2      18 Sep 2021 07:23  Phos  3.8     09-18  Mg     2.20     09-18                RADIOLOGY & ADDITIONAL TESTS:    Imaging Personally Reviewed:    Consultant(s) Notes Reviewed:      Care Discussed with Consultants/Other Providers:  
    SUBJECTIVE / OVERNIGHT EVENTS: pt seen and examined    MEDICATIONS  (STANDING):  budesonide 160 MICROgram(s)/formoterol 4.5 MICROgram(s) Inhaler 2 Puff(s) Inhalation two times a day  ferrous    sulfate 325 milliGRAM(s) Oral daily  hydrALAZINE 25 milliGRAM(s) Oral three times a day  metoprolol succinate ER 25 milliGRAM(s) Oral daily  pantoprazole   Suspension 40 milliGRAM(s) Oral daily  polyethylene glycol 3350 17 Gram(s) Oral daily  sodium chloride 0.65% Nasal 1 Spray(s) Both Nostrils every 6 hours    MEDICATIONS  (PRN):  acetaminophen   Tablet .. 650 milliGRAM(s) Oral every 6 hours PRN Temp greater or equal to 38C (100.4F), Mild Pain (1 - 3)  ALBUTerol    90 MICROgram(s) HFA Inhaler 2 Puff(s) Inhalation every 6 hours PRN Shortness of Breath and/or Wheezing  aluminum hydroxide/magnesium hydroxide/simethicone Suspension 30 milliLiter(s) Oral every 4 hours PRN Dyspepsia    Vital Signs Last 24 Hrs  T(C): 36.8 (21 @ 21:12), Max: 36.8 (21 @ 05:10)  T(F): 98.3 (21 @ 21:12), Max: 98.3 (21 @ 21:12)  HR: 63 (21 @ 21:12) (63 - 71)  BP: 154/76 (21 @ 21:12) (104/48 - 154/76)  BP(mean): 89 (21 @ 05:10) (89 - 89)  RR: 18 (21 @ 21:12) (18 - 22)  SpO2: 99% (21 @ 21:12) (97% - 100%)        Constitutional: No fever, fatigue  Skin: No rash.  Eyes: No recent vision problems or eye pain.  ENT: No congestion, ear pain, or sore throat.  Cardiovascular: No chest pain or palpation.  Respiratory: No cough, shortness of breath, congestion, or wheezing.  Gastrointestinal: No abdominal pain, nausea, vomiting, or diarrhea.  Genitourinary: No dysuria.  Musculoskeletal: No joint swelling.  Neurologic: No headache.    PHYSICAL EXAM:  GENERAL: NAD  EYES: EOMI, PERRLA  NECK: Supple, No JVD  CHEST/LUNG: dec breath sounds at bases  HEART:  S1 , S2 +  ABDOMEN: soft , bs+  EXTREMITIES: + edema   NEUROLOGY:alert awake    LABS:      137  |  94<L>  |  76<H>  ----------------------------<  162<H>  4.0   |  31  |  1.96<H>    Ca    9.7      23 Sep 2021 07:36  Phos  5.0       Mg     2.70           Creatinine Trend: 1.96 <--, 1.55 <--, 1.76 <--, 1.55 <--, 1.55 <--, 1.52 <--, 1.48 <--                        8.7    6.73  )-----------( 318      ( 23 Sep 2021 07:36 )             29.9     Urine Studies:  Urinalysis Basic - ( 23 Sep 2021 20:24 )    Color: Light Yellow / Appearance: Clear / S.015 / pH:   Gluc:  / Ketone: Negative  / Bili: Negative / Urobili: <2 mg/dL   Blood:  / Protein: Negative / Nitrite: Negative   Leuk Esterase: Negative / RBC:  / WBC    Sq Epi:  / Non Sq Epi:  / Bacteria:                   
    SUBJECTIVE / OVERNIGHT EVENTS: pt seen and examined    MEDICATIONS  (STANDING):  budesonide 160 MICROgram(s)/formoterol 4.5 MICROgram(s) Inhaler 2 Puff(s) Inhalation two times a day  buMETAnide 2 milliGRAM(s) Oral two times a day  ferrous    sulfate 325 milliGRAM(s) Oral daily  hydrALAZINE 25 milliGRAM(s) Oral three times a day  metoprolol succinate ER 25 milliGRAM(s) Oral daily  pantoprazole   Suspension 40 milliGRAM(s) Oral daily  polyethylene glycol 3350 17 Gram(s) Oral daily  sodium chloride 0.65% Nasal 1 Spray(s) Both Nostrils every 6 hours    MEDICATIONS  (PRN):  acetaminophen   Tablet .. 650 milliGRAM(s) Oral every 6 hours PRN Temp greater or equal to 38C (100.4F), Mild Pain (1 - 3)  ALBUTerol    90 MICROgram(s) HFA Inhaler 2 Puff(s) Inhalation every 6 hours PRN Shortness of Breath and/or Wheezing  aluminum hydroxide/magnesium hydroxide/simethicone Suspension 30 milliLiter(s) Oral every 4 hours PRN Dyspepsia    Vital Signs Last 24 Hrs  T(C): 36.4 (09-22-21 @ 20:32), Max: 36.8 (09-22-21 @ 10:00)  T(F): 97.6 (09-22-21 @ 20:32), Max: 98.3 (09-22-21 @ 10:00)  HR: 66 (09-22-21 @ 20:32) (61 - 66)  BP: 126/60 (09-22-21 @ 20:32) (112/59 - 159/75)  BP(mean): 82 (09-22-21 @ 20:32) (82 - 82)  RR: 20 (09-22-21 @ 20:32) (18 - 20)  SpO2: 99% (09-22-21 @ 20:32) (95% - 100%)      Constitutional: No fever, fatigue  Skin: No rash.  Eyes: No recent vision problems or eye pain.  ENT: No congestion, ear pain, or sore throat.  Cardiovascular: No chest pain or palpation.  Respiratory: No cough, shortness of breath, congestion, or wheezing.  Gastrointestinal: No abdominal pain, nausea, vomiting, or diarrhea.  Genitourinary: No dysuria.  Musculoskeletal: No joint swelling.  Neurologic: No headache.    PHYSICAL EXAM:  GENERAL: NAD  EYES: EOMI, PERRLA  NECK: Supple, No JVD  CHEST/LUNG: dec breath sounds at bases  HEART:  S1 , S2 +  ABDOMEN: soft , bs+  EXTREMITIES: + edema   NEUROLOGY:alert awake    LABS:  09-22    142  |  91<L>  |  64<H>  ----------------------------<  120<H>  4.4   |  31  |  1.55<H>    Ca    10.3      22 Sep 2021 07:28  Phos  4.7     09-22  Mg     2.70     09-22    TPro  8.7<H>  /  Alb  4.0  /  TBili  0.4  /  DBili      /  AST  17  /  ALT  8   /  AlkPhos  119  09-21    Creatinine Trend: 1.55 <--, 1.76 <--, 1.55 <--, 1.55 <--, 1.52 <--, 1.48 <--, 1.58 <--, 1.48 <--                        9.2    7.06  )-----------( 305      ( 22 Sep 2021 07:28 )             31.3     Urine Studies:            LIVER FUNCTIONS - ( 21 Sep 2021 05:21 )  Alb: 4.0 g/dL / Pro: 8.7 g/dL / ALK PHOS: 119 U/L / ALT: 8 U/L / AST: 17 U/L / GGT: x             
    SUBJECTIVE / OVERNIGHT EVENTS: pt seen and examined    MEDICATIONS  (STANDING):  budesonide 160 MICROgram(s)/formoterol 4.5 MICROgram(s) Inhaler 2 Puff(s) Inhalation two times a day  buMETAnide 2 milliGRAM(s) Oral two times a day  ferrous    sulfate 325 milliGRAM(s) Oral daily  hydrALAZINE 25 milliGRAM(s) Oral three times a day  metoprolol succinate ER 25 milliGRAM(s) Oral daily  pantoprazole   Suspension 40 milliGRAM(s) Oral daily  polyethylene glycol 3350 17 Gram(s) Oral daily  sodium chloride 0.65% Nasal 1 Spray(s) Both Nostrils every 6 hours    MEDICATIONS  (PRN):  acetaminophen   Tablet .. 650 milliGRAM(s) Oral every 6 hours PRN Temp greater or equal to 38C (100.4F), Mild Pain (1 - 3)  ALBUTerol    90 MICROgram(s) HFA Inhaler 2 Puff(s) Inhalation every 6 hours PRN Shortness of Breath and/or Wheezing  aluminum hydroxide/magnesium hydroxide/simethicone Suspension 30 milliLiter(s) Oral every 4 hours PRN Dyspepsia    Vital Signs Last 24 Hrs  T(C): 36.9 (09-21-21 @ 20:45), Max: 36.9 (09-21-21 @ 20:45)  T(F): 98.5 (09-21-21 @ 20:45), Max: 98.5 (09-21-21 @ 20:45)  HR: 65 (09-21-21 @ 20:45) (65 - 69)  BP: 127/60 (09-21-21 @ 20:45) (127/60 - 158/88)  BP(mean): --  RR: 18 (09-21-21 @ 20:45) (16 - 18)  SpO2: 100% (09-21-21 @ 20:45) (93% - 100%)      Constitutional: No fever, fatigue  Skin: No rash.  Eyes: No recent vision problems or eye pain.  ENT: No congestion, ear pain, or sore throat.  Cardiovascular: No chest pain or palpation.  Respiratory: No cough, shortness of breath, congestion, or wheezing.  Gastrointestinal: No abdominal pain, nausea, vomiting, or diarrhea.  Genitourinary: No dysuria.  Musculoskeletal: No joint swelling.  Neurologic: No headache.    PHYSICAL EXAM:  GENERAL: NAD  EYES: EOMI, PERRLA  NECK: Supple, No JVD  CHEST/LUNG: dec breath sounds at bases  HEART:  S1 , S2 +  ABDOMEN: soft , bs+  EXTREMITIES: + edema   NEUROLOGY:alert awake      LABS:  09-21    137  |  93<L>  |  64<H>  ----------------------------<  152<H>  4.0   |  35<H>  |  1.76<H>    Ca    10.5      21 Sep 2021 05:21  Phos  4.2     09-21  Mg     2.60     09-21    TPro  8.7<H>  /  Alb  4.0  /  TBili  0.4  /  DBili      /  AST  17  /  ALT  8   /  AlkPhos  119  09-21    Creatinine Trend: 1.76 <--, 1.55 <--, 1.55 <--, 1.52 <--, 1.48 <--, 1.58 <--, 1.48 <--                        9.1    7.81  )-----------( 302      ( 21 Sep 2021 05:21 )             30.8     Urine Studies:            LIVER FUNCTIONS - ( 21 Sep 2021 05:21 )  Alb: 4.0 g/dL / Pro: 8.7 g/dL / ALK PHOS: 119 U/L / ALT: 8 U/L / AST: 17 U/L / GGT: x             
    SUBJECTIVE / OVERNIGHT EVENTS: pt seen and examined      MEDICATIONS  (STANDING):  budesonide 160 MICROgram(s)/formoterol 4.5 MICROgram(s) Inhaler 2 Puff(s) Inhalation two times a day  ferrous    sulfate 325 milliGRAM(s) Oral daily  furosemide   Injectable 40 milliGRAM(s) IV Push every 8 hours  metoprolol succinate ER 25 milliGRAM(s) Oral daily  oxymetazoline 0.05% Nasal Spray 2 Spray(s) Both Nostrils two times a day  pantoprazole   Suspension 40 milliGRAM(s) Oral daily  polyethylene glycol 3350 17 Gram(s) Oral daily  sodium chloride 0.65% Nasal 1 Spray(s) Both Nostrils every 6 hours    MEDICATIONS  (PRN):  acetaminophen   Tablet .. 650 milliGRAM(s) Oral every 6 hours PRN Temp greater or equal to 38C (100.4F), Mild Pain (1 - 3)  ALBUTerol    90 MICROgram(s) HFA Inhaler 2 Puff(s) Inhalation every 6 hours PRN Shortness of Breath and/or Wheezing    Vital Signs Last 24 Hrs  T(C): 36.9 (09-18-21 @ 21:31), Max: 37 (09-18-21 @ 16:02)  T(F): 98.5 (09-18-21 @ 21:31), Max: 98.6 (09-18-21 @ 16:02)  HR: 60 (09-18-21 @ 21:31) (59 - 70)  BP: 133/54 (09-18-21 @ 21:31) (120/52 - 153/67)  BP(mean): --  RR: 18 (09-18-21 @ 21:31) (18 - 22)  SpO2: 94% (09-18-21 @ 21:31) (94% - 100%)        CAPILLARY BLOOD GLUCOSE        I&O's Summary    17 Sep 2021 07:01  -  18 Sep 2021 07:00  --------------------------------------------------------  IN: 420 mL / OUT: 1400 mL / NET: -980 mL    18 Sep 2021 07:01  -  18 Sep 2021 23:35  --------------------------------------------------------  IN: 0 mL / OUT: 800 mL / NET: -800 mL        Constitutional: No fever, fatigue  Skin: No rash.  Eyes: No recent vision problems or eye pain.  ENT: No congestion, ear pain, or sore throat.  Cardiovascular: No chest pain or palpation.  Respiratory: No cough, shortness of breath, congestion, or wheezing.  Gastrointestinal: No abdominal pain, nausea, vomiting, or diarrhea.  Genitourinary: No dysuria.  Musculoskeletal: No joint swelling.  Neurologic: No headache.    PHYSICAL EXAM:  GENERAL: NAD  EYES: EOMI, PERRLA  NECK: Supple, No JVD  CHEST/LUNG: dec breath sounds at bases  HEART:  S1 , S2 +  ABDOMEN: soft , bs+  EXTREMITIES: + edema   NEUROLOGY:alert awake      LABS:                        8.9    6.51  )-----------( 248      ( 18 Sep 2021 07:23 )             30.4     09-18    138  |  96<L>  |  58<H>  ----------------------------<  108<H>  4.6   |  35<H>  |  1.52<H>    Ca    10.2      18 Sep 2021 07:23  Phos  3.8     09-18  Mg     2.20     09-18                RADIOLOGY & ADDITIONAL TESTS:    Imaging Personally Reviewed:    Consultant(s) Notes Reviewed:      Care Discussed with Consultants/Other Providers:

## 2021-09-24 NOTE — PROGRESS NOTE ADULT - PROVIDER SPECIALTY LIST ADULT
Nephrology
Cardiology
Internal Medicine
Nephrology
Internal Medicine

## 2021-09-24 NOTE — DISCHARGE NOTE PROVIDER - CARE PROVIDER_API CALL
PCP,   Phone: (   )    -  Fax: (   )    -  Follow Up Time:     Cardiology,   Phone: (   )    -  Fax: (   )    -  Follow Up Time:

## 2021-09-24 NOTE — DISCHARGE NOTE PROVIDER - PROVIDER TOKENS
FREE:[LAST:[PCP],PHONE:[(   )    -],FAX:[(   )    -]],FREE:[LAST:[Cardiology],PHONE:[(   )    -],FAX:[(   )    -]]

## 2021-09-24 NOTE — DISCHARGE NOTE PROVIDER - NSDCMRMEDTOKEN_GEN_ALL_CORE_FT
budesonide-formoterol 160 mcg-4.5 mcg/inh inhalation aerosol: 2 puff(s) inhaled 2 times a day   bumetanide 2 mg oral tablet: 1 tab(s) orally once a day  ferrous sulfate 324 mg (65 mg elemental iron) oral tablet: 1 tab(s) orally once a day  hydrALAZINE 25 mg oral tablet: 1 tab(s) orally 3 times a day  Metoprolol Succinate ER 25 mg oral tablet, extended release: 1 tab(s) orally once a day  ProAir HFA 90 mcg/inh inhalation aerosol: 2 puff(s) inhaled every 6 hours  Protonix 40 mg oral delayed release tablet: 1 tab(s) orally once a day   simvastatin 10 mg oral tablet: 1 tab(s) orally once a day (at bedtime)  sodium chloride nasal spray: 1 spray(s) nasal every 6 hours

## 2021-10-15 NOTE — DISCHARGE NOTE NURSING/CASE MANAGEMENT/SOCIAL WORK - NSDCPEPTCOWADIET_GEN_ALL_CORE
No Keep your intake of vitamin K regular. The highest amount of vitamin K is found in green and leafy vegetables like broccoli, lettuces, cabbage, and spinach. You can eat these foods but keep the portion size the same. Changes in the amount you eat can affect your PT/INR blood test. Contact your doctor before making any major changes in your diet. Limit your alcohol intake.

## 2021-11-15 NOTE — ED ADULT TRIAGE NOTE - PATIENT ON (OXYGEN DELIVERY METHOD)
nasal cannula

## 2021-12-23 ENCOUNTER — INPATIENT (INPATIENT)
Facility: HOSPITAL | Age: 71
LOS: 21 days | Discharge: HOME | End: 2022-01-14
Attending: INTERNAL MEDICINE | Admitting: INTERNAL MEDICINE
Payer: MEDICARE

## 2021-12-23 VITALS
SYSTOLIC BLOOD PRESSURE: 152 MMHG | RESPIRATION RATE: 20 BRPM | DIASTOLIC BLOOD PRESSURE: 71 MMHG | TEMPERATURE: 98 F | OXYGEN SATURATION: 93 % | HEIGHT: 64 IN | HEART RATE: 72 BPM

## 2021-12-23 DIAGNOSIS — I50.9 HEART FAILURE, UNSPECIFIED: ICD-10-CM

## 2021-12-23 DIAGNOSIS — I25.10 ATHEROSCLEROTIC HEART DISEASE OF NATIVE CORONARY ARTERY WITHOUT ANGINA PECTORIS: ICD-10-CM

## 2021-12-23 DIAGNOSIS — I50.33 ACUTE ON CHRONIC DIASTOLIC (CONGESTIVE) HEART FAILURE: ICD-10-CM

## 2021-12-23 DIAGNOSIS — Z29.9 ENCOUNTER FOR PROPHYLACTIC MEASURES, UNSPECIFIED: ICD-10-CM

## 2021-12-23 DIAGNOSIS — Z95.4 PRESENCE OF OTHER HEART-VALVE REPLACEMENT: Chronic | ICD-10-CM

## 2021-12-23 DIAGNOSIS — R60.9 EDEMA, UNSPECIFIED: ICD-10-CM

## 2021-12-23 DIAGNOSIS — I50.23 ACUTE ON CHRONIC SYSTOLIC (CONGESTIVE) HEART FAILURE: ICD-10-CM

## 2021-12-23 DIAGNOSIS — I10 ESSENTIAL (PRIMARY) HYPERTENSION: ICD-10-CM

## 2021-12-23 DIAGNOSIS — R79.89 OTHER SPECIFIED ABNORMAL FINDINGS OF BLOOD CHEMISTRY: ICD-10-CM

## 2021-12-23 DIAGNOSIS — J44.9 CHRONIC OBSTRUCTIVE PULMONARY DISEASE, UNSPECIFIED: ICD-10-CM

## 2021-12-23 LAB
ALBUMIN SERPL ELPH-MCNC: 3.9 G/DL — SIGNIFICANT CHANGE UP (ref 3.3–5)
ALP SERPL-CCNC: 176 U/L — HIGH (ref 40–120)
ALT FLD-CCNC: 11 U/L — SIGNIFICANT CHANGE UP (ref 4–33)
ANION GAP SERPL CALC-SCNC: 8 MMOL/L — SIGNIFICANT CHANGE UP (ref 7–14)
APTT BLD: 38.4 SEC — HIGH (ref 27–36.3)
AST SERPL-CCNC: 23 U/L — SIGNIFICANT CHANGE UP (ref 4–32)
BASOPHILS # BLD AUTO: 0.02 K/UL — SIGNIFICANT CHANGE UP (ref 0–0.2)
BASOPHILS NFR BLD AUTO: 0.3 % — SIGNIFICANT CHANGE UP (ref 0–2)
BILIRUB SERPL-MCNC: 0.5 MG/DL — SIGNIFICANT CHANGE UP (ref 0.2–1.2)
BUN SERPL-MCNC: 41 MG/DL — HIGH (ref 7–23)
CALCIUM SERPL-MCNC: 10.1 MG/DL — SIGNIFICANT CHANGE UP (ref 8.4–10.5)
CHLORIDE SERPL-SCNC: 93 MMOL/L — LOW (ref 98–107)
CO2 SERPL-SCNC: 38 MMOL/L — HIGH (ref 22–31)
CREAT SERPL-MCNC: 1.42 MG/DL — HIGH (ref 0.5–1.3)
EOSINOPHIL # BLD AUTO: 0.06 K/UL — SIGNIFICANT CHANGE UP (ref 0–0.5)
EOSINOPHIL NFR BLD AUTO: 0.9 % — SIGNIFICANT CHANGE UP (ref 0–6)
FLUAV AG NPH QL: SIGNIFICANT CHANGE UP
FLUBV AG NPH QL: SIGNIFICANT CHANGE UP
GLUCOSE SERPL-MCNC: 119 MG/DL — HIGH (ref 70–99)
HCT VFR BLD CALC: 32.4 % — LOW (ref 34.5–45)
HGB BLD-MCNC: 9.1 G/DL — LOW (ref 11.5–15.5)
IANC: 4.64 K/UL — SIGNIFICANT CHANGE UP (ref 1.5–8.5)
IMM GRANULOCYTES NFR BLD AUTO: 0.8 % — SIGNIFICANT CHANGE UP (ref 0–1.5)
INR BLD: 1.18 RATIO — HIGH (ref 0.88–1.16)
LYMPHOCYTES # BLD AUTO: 1.04 K/UL — SIGNIFICANT CHANGE UP (ref 1–3.3)
LYMPHOCYTES # BLD AUTO: 16.1 % — SIGNIFICANT CHANGE UP (ref 13–44)
MAGNESIUM SERPL-MCNC: 2.1 MG/DL — SIGNIFICANT CHANGE UP (ref 1.6–2.6)
MCHC RBC-ENTMCNC: 26.8 PG — LOW (ref 27–34)
MCHC RBC-ENTMCNC: 28.1 GM/DL — LOW (ref 32–36)
MCV RBC AUTO: 95.3 FL — SIGNIFICANT CHANGE UP (ref 80–100)
MONOCYTES # BLD AUTO: 0.64 K/UL — SIGNIFICANT CHANGE UP (ref 0–0.9)
MONOCYTES NFR BLD AUTO: 9.9 % — SIGNIFICANT CHANGE UP (ref 2–14)
NEUTROPHILS # BLD AUTO: 4.64 K/UL — SIGNIFICANT CHANGE UP (ref 1.8–7.4)
NEUTROPHILS NFR BLD AUTO: 72 % — SIGNIFICANT CHANGE UP (ref 43–77)
NRBC # BLD: 0 /100 WBCS — SIGNIFICANT CHANGE UP
NRBC # FLD: 0 K/UL — SIGNIFICANT CHANGE UP
NT-PROBNP SERPL-SCNC: 2367 PG/ML — HIGH
PLATELET # BLD AUTO: 357 K/UL — SIGNIFICANT CHANGE UP (ref 150–400)
POTASSIUM SERPL-MCNC: 4 MMOL/L — SIGNIFICANT CHANGE UP (ref 3.5–5.3)
POTASSIUM SERPL-SCNC: 4 MMOL/L — SIGNIFICANT CHANGE UP (ref 3.5–5.3)
PROT SERPL-MCNC: 8.8 G/DL — HIGH (ref 6–8.3)
PROTHROM AB SERPL-ACNC: 13.3 SEC — SIGNIFICANT CHANGE UP (ref 10.6–13.6)
RBC # BLD: 3.4 M/UL — LOW (ref 3.8–5.2)
RBC # FLD: 15.3 % — HIGH (ref 10.3–14.5)
RSV RNA NPH QL NAA+NON-PROBE: SIGNIFICANT CHANGE UP
SARS-COV-2 RNA SPEC QL NAA+PROBE: SIGNIFICANT CHANGE UP
SODIUM SERPL-SCNC: 139 MMOL/L — SIGNIFICANT CHANGE UP (ref 135–145)
TROPONIN T, HIGH SENSITIVITY RESULT: 32 NG/L — SIGNIFICANT CHANGE UP
TROPONIN T, HIGH SENSITIVITY RESULT: 32 NG/L — SIGNIFICANT CHANGE UP
WBC # BLD: 6.45 K/UL — SIGNIFICANT CHANGE UP (ref 3.8–10.5)
WBC # FLD AUTO: 6.45 K/UL — SIGNIFICANT CHANGE UP (ref 3.8–10.5)

## 2021-12-23 PROCEDURE — 93010 ELECTROCARDIOGRAM REPORT: CPT

## 2021-12-23 PROCEDURE — 99223 1ST HOSP IP/OBS HIGH 75: CPT

## 2021-12-23 PROCEDURE — 93970 EXTREMITY STUDY: CPT | Mod: 26

## 2021-12-23 PROCEDURE — 99285 EMERGENCY DEPT VISIT HI MDM: CPT | Mod: 25

## 2021-12-23 PROCEDURE — 71045 X-RAY EXAM CHEST 1 VIEW: CPT | Mod: 26

## 2021-12-23 RX ORDER — PANTOPRAZOLE SODIUM 20 MG/1
40 TABLET, DELAYED RELEASE ORAL
Refills: 0 | Status: DISCONTINUED | OUTPATIENT
Start: 2021-12-23 | End: 2022-01-14

## 2021-12-23 RX ORDER — SODIUM CHLORIDE 0.65 %
1 AEROSOL, SPRAY (ML) NASAL EVERY 8 HOURS
Refills: 0 | Status: DISCONTINUED | OUTPATIENT
Start: 2021-12-23 | End: 2022-01-14

## 2021-12-23 RX ORDER — OXYMETAZOLINE HYDROCHLORIDE 0.5 MG/ML
2 SPRAY NASAL EVERY 12 HOURS
Refills: 0 | Status: DISCONTINUED | OUTPATIENT
Start: 2021-12-23 | End: 2022-01-14

## 2021-12-23 RX ORDER — BUDESONIDE AND FORMOTEROL FUMARATE DIHYDRATE 160; 4.5 UG/1; UG/1
2 AEROSOL RESPIRATORY (INHALATION)
Refills: 0 | Status: DISCONTINUED | OUTPATIENT
Start: 2021-12-23 | End: 2022-01-06

## 2021-12-23 RX ORDER — MECLIZINE HCL 12.5 MG
25 TABLET ORAL ONCE
Refills: 0 | Status: COMPLETED | OUTPATIENT
Start: 2021-12-23 | End: 2021-12-23

## 2021-12-23 RX ORDER — BUMETANIDE 0.25 MG/ML
2 INJECTION INTRAMUSCULAR; INTRAVENOUS ONCE
Refills: 0 | Status: COMPLETED | OUTPATIENT
Start: 2021-12-23 | End: 2021-12-23

## 2021-12-23 RX ORDER — HEPARIN SODIUM 5000 [USP'U]/ML
5000 INJECTION INTRAVENOUS; SUBCUTANEOUS EVERY 8 HOURS
Refills: 0 | Status: DISCONTINUED | OUTPATIENT
Start: 2021-12-23 | End: 2022-01-14

## 2021-12-23 RX ORDER — ALBUTEROL 90 UG/1
2 AEROSOL, METERED ORAL EVERY 6 HOURS
Refills: 0 | Status: DISCONTINUED | OUTPATIENT
Start: 2021-12-23 | End: 2022-01-05

## 2021-12-23 RX ORDER — FERROUS SULFATE 325(65) MG
325 TABLET ORAL DAILY
Refills: 0 | Status: DISCONTINUED | OUTPATIENT
Start: 2021-12-23 | End: 2022-01-14

## 2021-12-23 RX ORDER — SIMVASTATIN 20 MG/1
10 TABLET, FILM COATED ORAL AT BEDTIME
Refills: 0 | Status: DISCONTINUED | OUTPATIENT
Start: 2021-12-23 | End: 2022-01-14

## 2021-12-23 RX ORDER — HYDRALAZINE HCL 50 MG
25 TABLET ORAL EVERY 8 HOURS
Refills: 0 | Status: DISCONTINUED | OUTPATIENT
Start: 2021-12-23 | End: 2022-01-14

## 2021-12-23 RX ORDER — BUMETANIDE 0.25 MG/ML
2 INJECTION INTRAMUSCULAR; INTRAVENOUS DAILY
Refills: 0 | Status: COMPLETED | OUTPATIENT
Start: 2021-12-23 | End: 2021-12-25

## 2021-12-23 RX ORDER — METOPROLOL TARTRATE 50 MG
25 TABLET ORAL DAILY
Refills: 0 | Status: DISCONTINUED | OUTPATIENT
Start: 2021-12-23 | End: 2022-01-07

## 2021-12-23 RX ADMIN — SIMVASTATIN 10 MILLIGRAM(S): 20 TABLET, FILM COATED ORAL at 21:09

## 2021-12-23 RX ADMIN — BUMETANIDE 2 MILLIGRAM(S): 0.25 INJECTION INTRAMUSCULAR; INTRAVENOUS at 10:42

## 2021-12-23 RX ADMIN — Medication 25 MILLIGRAM(S): at 21:10

## 2021-12-23 RX ADMIN — HEPARIN SODIUM 5000 UNIT(S): 5000 INJECTION INTRAVENOUS; SUBCUTANEOUS at 21:10

## 2021-12-23 RX ADMIN — BUDESONIDE AND FORMOTEROL FUMARATE DIHYDRATE 2 PUFF(S): 160; 4.5 AEROSOL RESPIRATORY (INHALATION) at 21:09

## 2021-12-23 RX ADMIN — Medication 25 MILLIGRAM(S): at 10:00

## 2021-12-23 RX ADMIN — OXYMETAZOLINE HYDROCHLORIDE 2 SPRAY(S): 0.5 SPRAY NASAL at 22:28

## 2021-12-23 NOTE — ED ADULT TRIAGE NOTE - CHIEF COMPLAINT QUOTE
Pt stats that she has been feeling dizzy for the past week, getting worse.  Pt also states that she had a nosebleed last night.  PMH COPD O2 dependant on 3LPM, Gout, HTN

## 2021-12-23 NOTE — ED PROVIDER NOTE - OBJECTIVE STATEMENT
70yo F hx of COPD, CHF, mitral and tricuspid valve repair here for dizziness. x1 week, feeling generally weak, no falls or syncope, states she has felt this way before when she had "fluid in lungs" or when she was anemic. Yesterday had 1hr of nose bleed, not on thinners. SOB when laying flat, legs more swollen, no cp. Denies fever, cough, recent illness, abdominal pain, melena or hematochezia.

## 2021-12-23 NOTE — H&P ADULT - ASSESSMENT
70yo F hx of COPD, CHF, mitral and tricuspid valve repair here for dizziness. x1 week, feeling generally weak,  Patient notes no falls or syncope, states she has felt this way before when she had "fluid in lungs" or when she was anemic.   Patient noted  nose bleed or 1 hr yesturday- stopped  She was on coumadin 3 months ago for heart valves but was stopped - patient not sure why   She SOB when laying flat, legs more swollen, no cp. Denies fever, cough, recent illness, abdominal pain, melena or hematochezia.  Patient admitted with CHF, treated with bumex in ED, already notes less SOB   70yo F hx of COPD, CHF, mitral and tricuspid valve repair here for dizziness. x1 week, feeling generally weak,  Patient notes no falls or syncope, states she has felt this way before when she had "fluid in lungs" or when she was anemic.   Patient noted  nose bleed or 1 hr yesturday- stopped  She was on coumadin 3 months ago for heart valves but was stopped - patient not sure why   She SOB when laying flat, legs more swollen, no cp. Denies fever, cough, recent illness, abdominal pain, melena or hematochezia.  Patient admitted with CHF, treated with bumex in ED, already notes less SOB  Tele, Una, Echo  C/w Bumex QD IV  Abnormal EKG Monitor HS trop  Monitor pro Bmp in AM  Keep K >4 on diuretics

## 2021-12-23 NOTE — H&P ADULT - HISTORY OF PRESENT ILLNESS
70yo F hx of COPD, CHF, mitral and tricuspid valve repair here for dizziness. x1 wee    72yo F hx of COPD, CHF, mitral and tricuspid valve repair here for dizziness. x1 week, feeling generally weak,  Patient notes no falls or syncope, states she has felt this way before when she had "fluid in lungs" or when she was anemic.   Patient noted  nose bleed or 1 hr yesturday- stopped  She was on coumadin 3 months ago for heart valves but was stopped - patient not sure why   She SOB when laying flat, legs more swollen, no cp. Denies fever, cough, recent illness, abdominal pain, melena or hematochezia.  Patient admitted with CHF, treated with bumex in ED, already notes less SOB    72yo F hx of COPD, CHF, mitral and tricuspid valve repair here for dizziness. x1 week, feeling generally weak,  Patient notes no falls or syncope, states she has felt this way before when she had "fluid in lungs" or when she was anemic.   Patient noted  nose bleed or 1 hr yesturday- stopped  She was on coumadin 3 months ago for heart valves but was stopped - patient not sure why   She SOB when laying flat, legs more swollen, no cp. Denies fever, cough, recent illness, abdominal pain, melena or hematochezia.  Patient admitted with CHF, treated with bumex in ED, already notes less SOB  Patient noted she has been staying home due to COVID  She notes she did NOT take Covid vaccine- Pat counselled please to take vaccine    72yo F hx of COPD, CHF, mitral and tricuspid valve repair here for dizziness. x1 week, feeling generally weakness . walkes with walker at home.  Patient notes no falls or syncope, states she has felt this way before when she had "fluid in lungs" or when she was anemic.   Patient noted  nose bleed or 1 hr yesterday stopped  She was on coumadin 3 months ago for heart valves but was stopped - patient not sure why   She SOB when laying flat, legs more swollen, no cp. Denies fever, cough, recent illness, abdominal pain, melena or hematochezia.  Patient admitted with CHF, treated with bumex in ED, already notes less SOB  Patient noted she has been staying home due to COVID  She notes she did NOT take Covid vaccine- Pat counselled please to take vaccine   71 year old hx of HTN, dCHF, MV and TV replacement, MIGUEL not on CPAP due to unable to afford, afib s/p ablation not on AC due to chronic bleeding, unable to do c-scope or endoscopy due to medical conditions, COPD on 3 L NC, here for dizziness. x1 week, feeling generally weakness . walkes with walker at home.    Patient notes no falls or syncope, states she has felt this way before when she had "fluid in lungs" or when she was anemic.   Patient noted  nose bleed or 1 hr yesterday stopped  She was on coumadin 3 months ago for heart valves but was stopped - patient not sure why   She SOB when laying flat, legs more swollen, no cp. Denies fever, cough, recent illness, abdominal pain, melena or hematochezia.  Patient admitted with CHF, treated with bumex in ED, already notes less SOB  Patient noted she has been staying home due to COVID  She notes she did NOT take Covid vaccine- Pat counselled please to take vaccine

## 2021-12-23 NOTE — H&P ADULT - NSHPLABSRESULTS_GEN_ALL_CORE
LABS:                        9.1    6.45  )-----------( 357      ( 23 Dec 2021 10:35 )             32.4     12-23    139  |  93<L>  |  41<H>  ----------------------------<  119<H>  4.0   |  38<H>  |  1.42<H>    Ca    10.1      23 Dec 2021 10:35  Mg     2.10     12-23    TPro  8.8<H>  /  Alb  3.9  /  TBili  0.5  /  DBili  x   /  AST  23  /  ALT  11  /  AlkPhos  176<H>  12-23      PT/INR - ( 23 Dec 2021 10:35 )   PT: 13.3 sec;   INR: 1.18 ratio         PTT - ( 23 Dec 2021 10:35 )  PTT:38.4 sec      EKG:    RADIOLOGY & ADDITIONAL TESTS: LABS:                        9.1    6.45  )-----------( 357      ( 23 Dec 2021 10:35 )             32.4     12-23    139  |  93<L>  |  41<H>  ----------------------------<  119<H>  4.0   |  38<H>  |  1.42<H>    Ca    10.1      23 Dec 2021 10:35  Mg     2.10     12-23    TPro  8.8<H>  /  Alb  3.9  /  TBili  0.5  /  DBili  x   /  AST  23  /  ALT  11  /  AlkPhos  176<H>  12-23  PT/INR - ( 23 Dec 2021 10:35 )   PT: 13.3 sec;   INR: 1.18 ratio    PTT - ( 23 Dec 2021 10:35 )  PTT:38.4 sec    hs trop 32, 32  EKG:ord    RADIOLOGY & ADDITIONAL TESTS:  ad< from: Xray Chest 1 View- PORTABLE-Urgent (Xray Chest 1 View- PORTABLE-Urgent .) (12.23.21 @ 09:49) >    IMPRESSION:  Cardiomegaly with clear lungs.    < from: Transthoracic Echocardiogram (09.21.21 @ 19:50) >    1. Mechanical prosthetic mitral valve replacement. Minimal  mitral regurgitation. Mean transmitral valve gradient  equals 5 mm Hg, which is probably normal in the setting of  a prosthetic valve.  2. Aortic valve leaflet morphology not well visualized.  Mild aortic regurgitation.  3. Endocardium not well visualized; grossly normal left  ventricular systolic function.  4. Unable to accurately evaluate right ventricular size or  systolic function. LABS:                        9.1    6.45  )-----------( 357      ( 23 Dec 2021 10:35 )             32.4     12-23    139  |  93<L>  |  41<H>  ----------------------------<  119<H>  4.0   |  38<H>  |  1.42<H>    Ca    10.1      23 Dec 2021 10:35  Mg     2.10     12-23    TPro  8.8<H>  /  Alb  3.9  /  TBili  0.5  /  DBili  x   /  AST  23  /  ALT  11  /  AlkPhos  176<H>  12-23  PT/INR - ( 23 Dec 2021 10:35 )   PT: 13.3 sec;   INR: 1.18 ratio    PTT - ( 23 Dec 2021 10:35 )  PTT:38.4 sec    hs trop 32, 32  EKG: NSR at 68 bpm with RBBB    RADIOLOGY & ADDITIONAL TESTS:  ad< from: Xray Chest 1 View- PORTABLE-Urgent (Xray Chest 1 View- PORTABLE-Urgent .) (12.23.21 @ 09:49) >    IMPRESSION:  Cardiomegaly with clear lungs.    < from: Transthoracic Echocardiogram (09.21.21 @ 19:50) >    1. Mechanical prosthetic mitral valve replacement. Minimal  mitral regurgitation. Mean transmitral valve gradient  equals 5 mm Hg, which is probably normal in the setting of  a prosthetic valve.  2. Aortic valve leaflet morphology not well visualized.  Mild aortic regurgitation.  3. Endocardium not well visualized; grossly normal left  ventricular systolic function.  4. Unable to accurately evaluate right ventricular size or  systolic function.

## 2021-12-23 NOTE — ED PROVIDER NOTE - CLINICAL SUMMARY MEDICAL DECISION MAKING FREE TEXT BOX
elderly pt w/ dizziness and SOB. VSS. Exam w/ crackles and bilateral LE edema. Concern for CHF vs symptomatic anemia. No complaints concerning for GIB. No focal findings on neurologic exam making central vertigo unlikely. Given hx of Afib and valvular repair, pt is at risk for stroke but unlikely given exam and that she states she has felt this way before 2/2 another etiology. Will order labs, ekg, cardiac markers, cxr, give her home dose of bumex, reassess. elderly pt w/ dizziness and SOB. VSS. Exam w/ crackles and bilateral LE edema. Concern for CHF vs symptomatic anemia. No complaints concerning for GIB. No focal findings on neurologic exam making central vertigo unlikely. Given hx of Afib and valvular repair, pt is at risk for stroke but unlikely given exam and that she states she has felt this way before 2/2 another etiology. Will order labs, ekg, cardiac markers, cxr, give her home dose of bumex, reassess.    MARIA DEL CARMEN Delgado, Attending: seen with resident and reviewed note.    Elderly female, COPD, home O2, CHF on bumex p/w weakness, dizziness, increase in weight. +VILLA/orthopnea. No pain, f/c, cough. Walks with walker. No bleeding sx. No focal neuro sx. No AC meds.    Looks comfortable on 3LNC. Leaning forward. + BLE edema. Vitals reassuring. Non lateralizing neuro exam.    Suspect acute CHF. Plan to check cardiac markers, electrolytes, diurese. Eval for PNA. Not consistent with PE/aortic disease. No signs of sepsis or shock.

## 2021-12-23 NOTE — H&P ADULT - PROBLEM SELECTOR PLAN 1
Tele, Una, Echo  C/w Bumex QD IV  Abnormal EKG Monitor HS trop Tele, Una, Echo  C/w Bumex QD IV  Abnormal EKG Monitor HS trop  Monitor pro Bmp in AM  Keep K >4 on diuretics Tele, Una, Echo  C/w Bumex QD IV  Abnormal EKG Monitor HS trop  Monitor pro Bmp in AM  Keep K >4 on diuretics  Cardiology consult called to Dr Rose Mary Frias

## 2021-12-23 NOTE — H&P ADULT - NSHPPHYSICALEXAM_GEN_ALL_CORE
Vital Signs Last 24 Hrs  T(C): 36.8 (23 Dec 2021 14:30), Max: 36.8 (23 Dec 2021 09:17)  T(F): 98.3 (23 Dec 2021 14:30), Max: 98.3 (23 Dec 2021 14:30)  HR: 70 (23 Dec 2021 14:30) (70 - 76)  BP: 117/64 (23 Dec 2021 14:30) (117/64 - 152/71)  BP(mean): --  RR: 19 (23 Dec 2021 14:30) (18 - 20)  SpO2: 100% (23 Dec 2021 14:30) (93% - 100%)      PHYSICAL EXAM:  GENERAL: NAD, well-developed  Obese  HEAD:  Atraumatic, Normocephalic  EYES: EOMI, PERRLA, conjunctiva and sclera clear  NECK: Supple, no JVD  CHEST/LUNG: Clear to auscultation bilaterally; no wheeze  HEART: Regular rate and rhythm; II/VI syst mur, rubs, or gallops  ABDOMEN: Soft, Nontender, Nondistended; Bowel sounds present  EXTREMITIES:  warm and well perfused, no clubbing, cyanosis,   B/L LE edema L> R  PSYCH: AAOx3  NEUROLOGY: non-focal  SKIN: no rashes or lesions

## 2021-12-23 NOTE — ED PROVIDER NOTE - PROGRESS NOTE DETAILS
MARIA DEL CARMEN Dumont (PGY-2) - pt w/ BNP 2.3k, trop 32 will rpt, consistent w/ CHF exacerbation. admit to Dr. Newman for CHF on tele.

## 2021-12-23 NOTE — ED ADULT NURSE NOTE - OBJECTIVE STATEMENT
Pt. with hx of CHF and COPD is A+Ox4 BIBEMS arrived complaining that she has been feeling dizzy for the past week, getting worse.  Pt also states that she had a nosebleed last night.  SPO2 93% on 4lpm in triage. Otherwise, VS stable. L/S reveal rales bilat in bases.

## 2021-12-23 NOTE — H&P ADULT - NSHPREVIEWOFSYSTEMS_GEN_ALL_CORE
CONSTITUTIONAL: No fever, weight loss, or fatigue  EYES: No eye pain, visual disturbances, or discharge  ENMT:  No difficulty hearing, tinnitus, vertigo; No sinus or throat pain  NECK: No pain or stiffness  BREASTS: No pain, masses, or nipple discharge  RESPIRATORY: No cough, wheezing, chills or hemoptysis;   POSITIVE shortness of breath  CARDIOVASCULAR: NO chest pain, palpitations, POSITIVE dizziness with excertion  POSITIVE leg swelling L>R  GASTROINTESTINAL: No abdominal or epigastric pain. No nausea, vomiting, or hematemesis; No diarrhea or constipation. No melena or hematochezia.  GENITOURINARY: No dysuria, frequency, hematuria, or incontinence  NEUROLOGICAL: No headaches, memory loss, loss of strength, numbness, or tremors  SKIN: No itching, burning, rashes, or lesions   LYMPH NODES: No enlarged glands  ENDOCRINE: No heat or cold intolerance; No hair loss  MUSCULOSKELETAL: No muscle or back pain  PSYCHIATRIC: No depression, anxiety, mood swings, or difficulty sleeping  HEME/LYMPH: No easy bruising, or bleeding gums  ALLERGY AND IMMUNOLOGIC: No hives or eczema

## 2021-12-23 NOTE — H&P ADULT - NSHPADDITIONALINFOADULT_GEN_ALL_CORE
PMD in AM with Dr Newman PMD in AM with Dr Newman  Seen by Ed Provider  "· HPI Objective Statement: 70yo F hx of COPD, CHF, mitral and tricuspid valve repair here for dizziness. x1 week, feeling generally weak, no falls or syncope, states she has felt this way before when she had "fluid in lungs" or when she was anemic. Yesterday had 1hr of nose bleed, not on thinners. SOB when laying flat, legs more swollen, no cp. Denies fever, cough, recent illness, abdominal pain, melena or hematochezia."

## 2021-12-23 NOTE — ED PROVIDER NOTE - PHYSICAL EXAMINATION
General: NAD, obese  HEENT: NCAT, PERRL, EOMI  Cardiac: RRR, no murmurs, 2+ peripheral pulses  Chest: +crackles at the bases  Abdomen: soft, non-distended, bowel sounds present, no ttp, no rebound or guarding  Extremities: no calf tenderness, or leg size discrepancies, +bilateral LE edema, pitting  Skin: no rashes  Neuro: AAOx4, cns intact, 5+motor and sensory grossly intact, no dysmetria  Psych: mood and affect appropriate

## 2021-12-24 LAB
ANION GAP SERPL CALC-SCNC: 8 MMOL/L — SIGNIFICANT CHANGE UP (ref 7–14)
BUN SERPL-MCNC: 42 MG/DL — HIGH (ref 7–23)
CALCIUM SERPL-MCNC: 10.3 MG/DL — SIGNIFICANT CHANGE UP (ref 8.4–10.5)
CHLORIDE SERPL-SCNC: 96 MMOL/L — LOW (ref 98–107)
CO2 SERPL-SCNC: 35 MMOL/L — HIGH (ref 22–31)
CREAT SERPL-MCNC: 1.43 MG/DL — HIGH (ref 0.5–1.3)
GLUCOSE SERPL-MCNC: 126 MG/DL — HIGH (ref 70–99)
HCT VFR BLD CALC: 31.2 % — LOW (ref 34.5–45)
HGB BLD-MCNC: 9.1 G/DL — LOW (ref 11.5–15.5)
MCHC RBC-ENTMCNC: 26.8 PG — LOW (ref 27–34)
MCHC RBC-ENTMCNC: 29.2 GM/DL — LOW (ref 32–36)
MCV RBC AUTO: 91.8 FL — SIGNIFICANT CHANGE UP (ref 80–100)
NRBC # BLD: 0 /100 WBCS — SIGNIFICANT CHANGE UP
NRBC # FLD: 0 K/UL — SIGNIFICANT CHANGE UP
NT-PROBNP SERPL-SCNC: 2466 PG/ML — HIGH
PLATELET # BLD AUTO: 391 K/UL — SIGNIFICANT CHANGE UP (ref 150–400)
POTASSIUM SERPL-MCNC: 4.3 MMOL/L — SIGNIFICANT CHANGE UP (ref 3.5–5.3)
POTASSIUM SERPL-SCNC: 4.3 MMOL/L — SIGNIFICANT CHANGE UP (ref 3.5–5.3)
RBC # BLD: 3.4 M/UL — LOW (ref 3.8–5.2)
RBC # FLD: 15.4 % — HIGH (ref 10.3–14.5)
SODIUM SERPL-SCNC: 139 MMOL/L — SIGNIFICANT CHANGE UP (ref 135–145)
WBC # BLD: 8.19 K/UL — SIGNIFICANT CHANGE UP (ref 3.8–10.5)
WBC # FLD AUTO: 8.19 K/UL — SIGNIFICANT CHANGE UP (ref 3.8–10.5)

## 2021-12-24 RX ORDER — OXYCODONE AND ACETAMINOPHEN 5; 325 MG/1; MG/1
1 TABLET ORAL ONCE
Refills: 0 | Status: DISCONTINUED | OUTPATIENT
Start: 2021-12-24 | End: 2021-12-24

## 2021-12-24 RX ORDER — ACETAMINOPHEN 500 MG
975 TABLET ORAL ONCE
Refills: 0 | Status: COMPLETED | OUTPATIENT
Start: 2021-12-24 | End: 2021-12-24

## 2021-12-24 RX ORDER — LIDOCAINE 4 G/100G
1 CREAM TOPICAL EVERY 24 HOURS
Refills: 0 | Status: DISCONTINUED | OUTPATIENT
Start: 2021-12-24 | End: 2022-01-14

## 2021-12-24 RX ADMIN — OXYMETAZOLINE HYDROCHLORIDE 2 SPRAY(S): 0.5 SPRAY NASAL at 21:46

## 2021-12-24 RX ADMIN — LIDOCAINE 1 PATCH: 4 CREAM TOPICAL at 22:23

## 2021-12-24 RX ADMIN — HEPARIN SODIUM 5000 UNIT(S): 5000 INJECTION INTRAVENOUS; SUBCUTANEOUS at 22:25

## 2021-12-24 RX ADMIN — BUDESONIDE AND FORMOTEROL FUMARATE DIHYDRATE 2 PUFF(S): 160; 4.5 AEROSOL RESPIRATORY (INHALATION) at 09:54

## 2021-12-24 RX ADMIN — BUDESONIDE AND FORMOTEROL FUMARATE DIHYDRATE 2 PUFF(S): 160; 4.5 AEROSOL RESPIRATORY (INHALATION) at 21:46

## 2021-12-24 RX ADMIN — Medication 25 MILLIGRAM(S): at 06:23

## 2021-12-24 RX ADMIN — HEPARIN SODIUM 5000 UNIT(S): 5000 INJECTION INTRAVENOUS; SUBCUTANEOUS at 06:23

## 2021-12-24 RX ADMIN — OXYCODONE AND ACETAMINOPHEN 1 TABLET(S): 5; 325 TABLET ORAL at 22:20

## 2021-12-24 RX ADMIN — Medication 975 MILLIGRAM(S): at 06:18

## 2021-12-24 RX ADMIN — Medication 25 MILLIGRAM(S): at 21:45

## 2021-12-24 RX ADMIN — Medication 25 MILLIGRAM(S): at 15:43

## 2021-12-24 RX ADMIN — OXYCODONE AND ACETAMINOPHEN 1 TABLET(S): 5; 325 TABLET ORAL at 22:48

## 2021-12-24 RX ADMIN — Medication 325 MILLIGRAM(S): at 15:43

## 2021-12-24 RX ADMIN — BUMETANIDE 2 MILLIGRAM(S): 0.25 INJECTION INTRAMUSCULAR; INTRAVENOUS at 06:23

## 2021-12-24 RX ADMIN — PANTOPRAZOLE SODIUM 40 MILLIGRAM(S): 20 TABLET, DELAYED RELEASE ORAL at 06:23

## 2021-12-24 NOTE — CONSULT NOTE ADULT - ASSESSMENT
EKG - NSR RBBB  Tele - episodes of Afib   Echo in 9/21 shows normal LV s/p MVR       a/p     1) Acute on chronic diastolic CHF - cont bumex 2mg IV dialy , echo in 9/21 shows normal LV s/p MVR moniter urine out put, daily weights , strict I/O, 1.5 liter fluid restriction     2) COPD - on O2     3) Afib - not on AC, pt doesnt remember why , as per chart sec to GI bleed , cont toprol     4) DVT prophylasix - on sc heparin

## 2021-12-24 NOTE — PATIENT PROFILE ADULT - NSTRANSFERBELONGINGSRESP_GEN_A_NUR
[email or web address]
yes

## 2021-12-24 NOTE — PATIENT PROFILE ADULT - FALL HARM RISK - HARM RISK INTERVENTIONS
Assistance with ambulation/Assistance OOB with selected safe patient handling equipment/Communicate Risk of Fall with Harm to all staff/Monitor gait and stability/Reinforce activity limits and safety measures with patient and family/Sit up slowly, dangle for a short time, stand at bedside before walking/Tailored Fall Risk Interventions/Visual Cue: Yellow wristband and red socks/Bed in lowest position, wheels locked, appropriate side rails in place/Call bell, personal items and telephone in reach/Instruct patient to call for assistance before getting out of bed or chair/Non-slip footwear when patient is out of bed/[place] to call system/Physically safe environment - no spills, clutter or unnecessary equipment/Purposeful Proactive Rounding/Room/bathroom lighting operational, light cord in reach

## 2021-12-24 NOTE — PROGRESS NOTE ADULT - SUBJECTIVE AND OBJECTIVE BOX
SUBJECTIVE / OVERNIGHT EVENTS: pt seen and examined    MEDICATIONS  (STANDING):  budesonide 160 MICROgram(s)/formoterol 4.5 MICROgram(s) Inhaler 2 Puff(s) Inhalation two times a day  buMETAnide Injectable 2 milliGRAM(s) IV Push daily  ferrous    sulfate 325 milliGRAM(s) Oral daily  heparin   Injectable 5000 Unit(s) SubCutaneous every 8 hours  hydrALAZINE 25 milliGRAM(s) Oral every 8 hours  metoprolol succinate ER 25 milliGRAM(s) Oral daily  pantoprazole    Tablet 40 milliGRAM(s) Oral before breakfast  simvastatin 10 milliGRAM(s) Oral at bedtime    MEDICATIONS  (PRN):  ALBUTerol    90 MICROgram(s) HFA Inhaler 2 Puff(s) Inhalation every 6 hours PRN Shortness of Breath and/or Wheezing  oxymetazoline 0.05% Nasal Spray 2 Spray(s) Nasal every 12 hours PRN Epistaxis  sodium chloride 0.65% Nasal 1 Spray(s) Both Nostrils every 8 hours PRN Nasal Congestion    Vital Signs Last 24 Hrs  T(C): 36.9 (12-24-21 @ 21:36), Max: 37.1 (12-24-21 @ 06:13)  T(F): 98.5 (12-24-21 @ 21:36), Max: 98.8 (12-24-21 @ 06:13)  HR: 79 (12-24-21 @ 21:36) (71 - 80)  BP: 140/94 (12-24-21 @ 21:36) (138/88 - 144/76)  BP(mean): --  RR: 20 (12-24-21 @ 21:36) (20 - 22)  SpO2: 100% (12-24-21 @ 21:36) (100% - 100%)    Constitutional: No fever, fatigue  Skin: No rash.  Eyes: No recent vision problems or eye pain.  ENT: No congestion, ear pain, or sore throat.  Cardiovascular: No chest pain or palpation.  Respiratory: No cough, shortness of breath, congestion, or wheezing.  Gastrointestinal: No abdominal pain, nausea, vomiting, or diarrhea.  Genitourinary: No dysuria.  Musculoskeletal: No joint swelling.  Neurologic: No headache.    PHYSICAL EXAM:  GENERAL: NAD  EYES: EOMI, PERRLA  NECK: Supple, No JVD  CHEST/LUNG: dec breath sounds at bases  HEART:  S1 , S2 +  ABDOMEN: soft , bs+  EXTREMITIES: + edema   NEUROLOGY:alert awake    LABS:  12-24    139  |  96<L>  |  42<H>  ----------------------------<  126<H>  4.3   |  35<H>  |  1.43<H>    Ca    10.3      24 Dec 2021 06:43  Mg     2.10     12-23    TPro  8.8<H>  /  Alb  3.9  /  TBili  0.5  /  DBili      /  AST  23  /  ALT  11  /  AlkPhos  176<H>  12-23    Creatinine Trend: 1.43 <--, 1.42 <--                        9.1    8.19  )-----------( 391      ( 24 Dec 2021 06:43 )             31.2     Urine Studies:            LIVER FUNCTIONS - ( 23 Dec 2021 10:35 )  Alb: 3.9 g/dL / Pro: 8.8 g/dL / ALK PHOS: 176 U/L / ALT: 11 U/L / AST: 23 U/L / GGT: x           PT/INR - ( 23 Dec 2021 10:35 )   PT: 13.3 sec;   INR: 1.18 ratio         PTT - ( 23 Dec 2021 10:35 )  PTT:38.4 sec

## 2021-12-24 NOTE — CONSULT NOTE ADULT - SUBJECTIVE AND OBJECTIVE BOX
Jonas Frias MD  Interventional Cardiology / Advance Heart Failure and Cardiac Transplant Specialist  [place] Office : [address] [place] [address] [place] [address]  Tel:   [place] Office : [address] [facility] [place] [address]  Tel: [phone]  Cell : [phone]      HISTORY OF PRESENTING ILLNESS:  HPI:  71 year old hx of HTN, dCHF, MV and TV replacement, MIGUEL not on CPAP due to unable to afford, afib s/p ablation not on AC due to chronic bleeding, unable to do c-scope or endoscopy due to medical conditions, COPD on 3 L NC, here for dizziness. x1 week, feeling generally weakness . walkes with walker at home.    Patient notes no falls or syncope, states she has felt this way before when she had "fluid in lungs" or when she was anemic.   Patient noted  nose bleed or 1 hr yesterday stopped  She was on coumadin 3 months ago for heart valves but was stopped - patient not sure why   She SOB when laying flat, legs more swollen, no cp. Denies fever, cough, recent illness, abdominal pain, melena or hematochezia.  Patient admitted with CHF, treated with bumex in ED, already notes less SOB  Patient noted she has been staying home due to COVID  She notes she did NOT take Covid vaccine- Pat counselled please to take vaccine    PAST MEDICAL & SURGICAL HISTORY:  Atrial fibrillation  S/P Ablation    Pulmonary hypertension    MIGUEL (obstructive sleep apnea)    COPD (chronic obstructive pulmonary disease)  on home O2    CHF (congestive heart failure)    HTN (hypertension)    Gout    Mitral valve replaced    Tricuspid valve replaced    CHF (congestive heart failure)    Hypertension    COPD (chronic obstructive pulmonary disease)    History of mitral valve replacement with mechanical valve    Tricuspid valve replaced  mechanical    No significant past surgical history        SOCIAL HISTORY: Substance Use (street drugs): ( x ) never used  (  ) other:    FAMILY HISTORY:  Family history of hypertension (Father, Sibling)    Family history of stroke    Family history of hypertension (Mother)           MEDICATIONS:  buMETAnide Injectable 2 milliGRAM(s) IV Push daily  heparin   Injectable 5000 Unit(s) SubCutaneous every 8 hours  hydrALAZINE 25 milliGRAM(s) Oral every 8 hours  metoprolol succinate ER 25 milliGRAM(s) Oral daily      ALBUTerol    90 MICROgram(s) HFA Inhaler 2 Puff(s) Inhalation every 6 hours PRN  budesonide 160 MICROgram(s)/formoterol 4.5 MICROgram(s) Inhaler 2 Puff(s) Inhalation two times a day      pantoprazole    Tablet 40 milliGRAM(s) Oral before breakfast    simvastatin 10 milliGRAM(s) Oral at bedtime    ferrous    sulfate 325 milliGRAM(s) Oral daily  oxymetazoline 0.05% Nasal Spray 2 Spray(s) Nasal every 12 hours PRN  sodium chloride 0.65% Nasal 1 Spray(s) Both Nostrils every 8 hours PRN      FAMILY HISTORY:  Family history of hypertension (Father, Sibling)    Family history of stroke    Family history of hypertension (Mother)          Allergies    No Known Allergies    Intolerances    	      PHYSICAL EXAM:  T(C): 36.6 (12-24-21 @ 10:42), Max: 37.1 (12-24-21 @ 06:13)  HR: 78 (12-24-21 @ 10:42) (70 - 80)  BP: 140/79 (12-24-21 @ 10:42) (117/64 - 141/75)  RR: 22 (12-24-21 @ 10:42) (18 - 22)  SpO2: 100% (12-24-21 @ 10:42) (100% - 100%)  Wt(kg): --  I&O's Summary       EYES:   PERRLA   ENMT:  No lesions  Cardiovascular: Normal S1 S2, No JVD, No murmurs, No edema  Respiratory: b/l rhonchi  Gastrointestinal:  Soft, Non-tender, + BS	  Extremities: 2+ edema      LABS:	 	    CARDIAC MARKERS:                                  9.1    8.19  )-----------( 391      ( 24 Dec 2021 06:43 )             31.2     12-24    139  |  96<L>  |  42<H>  ----------------------------<  126<H>  4.3   |  35<H>  |  1.43<H>    Ca    10.3      24 Dec 2021 06:43  Mg     2.10     12-23    TPro  8.8<H>  /  Alb  3.9  /  TBili  0.5  /  DBili  x   /  AST  23  /  ALT  11  /  AlkPhos  176<H>  12-23    proBNP: Serum Pro-Brain Natriuretic Peptide: 2466 pg/mL (12-24 @ 06:43)    Lipid Profile:   HgA1c:   TSH:     Consultant(s) Notes Reviewed:  [x ] YES  [ ] NO    Care Discussed with Consultants/Other Providers [ x] YES  [ ] NO    Imaging Personally Reviewed independently:  [x] YES  [ ] NO    All labs, radiologic studies, vitals, orders and medications list reviewed. Patient is seen and examined at bedside. Case discussed with medical team.    ASSESSMENT/PLAN:

## 2021-12-25 LAB
ANION GAP SERPL CALC-SCNC: 10 MMOL/L — SIGNIFICANT CHANGE UP (ref 7–14)
BUN SERPL-MCNC: 46 MG/DL — HIGH (ref 7–23)
CALCIUM SERPL-MCNC: 10.1 MG/DL — SIGNIFICANT CHANGE UP (ref 8.4–10.5)
CHLORIDE SERPL-SCNC: 97 MMOL/L — LOW (ref 98–107)
CO2 SERPL-SCNC: 33 MMOL/L — HIGH (ref 22–31)
CREAT SERPL-MCNC: 1.58 MG/DL — HIGH (ref 0.5–1.3)
GLUCOSE SERPL-MCNC: 127 MG/DL — HIGH (ref 70–99)
HCT VFR BLD CALC: 31 % — LOW (ref 34.5–45)
HGB BLD-MCNC: 8.7 G/DL — LOW (ref 11.5–15.5)
MAGNESIUM SERPL-MCNC: 2.2 MG/DL — SIGNIFICANT CHANGE UP (ref 1.6–2.6)
MCHC RBC-ENTMCNC: 26.4 PG — LOW (ref 27–34)
MCHC RBC-ENTMCNC: 28.1 GM/DL — LOW (ref 32–36)
MCV RBC AUTO: 93.9 FL — SIGNIFICANT CHANGE UP (ref 80–100)
NRBC # BLD: 0 /100 WBCS — SIGNIFICANT CHANGE UP
NRBC # FLD: 0 K/UL — SIGNIFICANT CHANGE UP
PHOSPHATE SERPL-MCNC: 4 MG/DL — SIGNIFICANT CHANGE UP (ref 2.5–4.5)
PLATELET # BLD AUTO: 366 K/UL — SIGNIFICANT CHANGE UP (ref 150–400)
POTASSIUM SERPL-MCNC: 4.3 MMOL/L — SIGNIFICANT CHANGE UP (ref 3.5–5.3)
POTASSIUM SERPL-SCNC: 4.3 MMOL/L — SIGNIFICANT CHANGE UP (ref 3.5–5.3)
RBC # BLD: 3.3 M/UL — LOW (ref 3.8–5.2)
RBC # FLD: 15.7 % — HIGH (ref 10.3–14.5)
SODIUM SERPL-SCNC: 140 MMOL/L — SIGNIFICANT CHANGE UP (ref 135–145)
WBC # BLD: 7.99 K/UL — SIGNIFICANT CHANGE UP (ref 3.8–10.5)
WBC # FLD AUTO: 7.99 K/UL — SIGNIFICANT CHANGE UP (ref 3.8–10.5)

## 2021-12-25 RX ORDER — OXYCODONE AND ACETAMINOPHEN 5; 325 MG/1; MG/1
1 TABLET ORAL ONCE
Refills: 0 | Status: DISCONTINUED | OUTPATIENT
Start: 2021-12-25 | End: 2021-12-25

## 2021-12-25 RX ORDER — ACETAMINOPHEN 500 MG
975 TABLET ORAL ONCE
Refills: 0 | Status: COMPLETED | OUTPATIENT
Start: 2021-12-25 | End: 2021-12-25

## 2021-12-25 RX ORDER — BUMETANIDE 0.25 MG/ML
2 INJECTION INTRAMUSCULAR; INTRAVENOUS DAILY
Refills: 0 | Status: DISCONTINUED | OUTPATIENT
Start: 2021-12-25 | End: 2021-12-26

## 2021-12-25 RX ADMIN — Medication 25 MILLIGRAM(S): at 22:33

## 2021-12-25 RX ADMIN — Medication 25 MILLIGRAM(S): at 16:01

## 2021-12-25 RX ADMIN — BUMETANIDE 2 MILLIGRAM(S): 0.25 INJECTION INTRAMUSCULAR; INTRAVENOUS at 09:50

## 2021-12-25 RX ADMIN — OXYCODONE AND ACETAMINOPHEN 1 TABLET(S): 5; 325 TABLET ORAL at 04:43

## 2021-12-25 RX ADMIN — HEPARIN SODIUM 5000 UNIT(S): 5000 INJECTION INTRAVENOUS; SUBCUTANEOUS at 16:01

## 2021-12-25 RX ADMIN — OXYCODONE AND ACETAMINOPHEN 1 TABLET(S): 5; 325 TABLET ORAL at 05:57

## 2021-12-25 RX ADMIN — BUDESONIDE AND FORMOTEROL FUMARATE DIHYDRATE 2 PUFF(S): 160; 4.5 AEROSOL RESPIRATORY (INHALATION) at 10:43

## 2021-12-25 RX ADMIN — SIMVASTATIN 10 MILLIGRAM(S): 20 TABLET, FILM COATED ORAL at 05:11

## 2021-12-25 RX ADMIN — HEPARIN SODIUM 5000 UNIT(S): 5000 INJECTION INTRAVENOUS; SUBCUTANEOUS at 05:10

## 2021-12-25 RX ADMIN — Medication 975 MILLIGRAM(S): at 09:41

## 2021-12-25 RX ADMIN — Medication 25 MILLIGRAM(S): at 05:10

## 2021-12-25 RX ADMIN — LIDOCAINE 1 PATCH: 4 CREAM TOPICAL at 11:00

## 2021-12-25 RX ADMIN — BUMETANIDE 2 MILLIGRAM(S): 0.25 INJECTION INTRAMUSCULAR; INTRAVENOUS at 05:11

## 2021-12-25 RX ADMIN — Medication 325 MILLIGRAM(S): at 16:01

## 2021-12-25 RX ADMIN — HEPARIN SODIUM 5000 UNIT(S): 5000 INJECTION INTRAVENOUS; SUBCUTANEOUS at 22:33

## 2021-12-25 NOTE — CONSULT NOTE ADULT - SUBJECTIVE AND OBJECTIVE BOX
[facility] NEPHROLOGY- CONSULTATION NOTE    71y Female with history of below presents with dizziness. Nephrology consulted for elevated Scr.    Patient known to our practice for h/o CKD-3b on prior admissions with baseline Scr 1.4-1.6. Patient had been on bumex 2 mg PO daily as an outpatient for h/o CHF which was converted to IV for volume overload on admission.    REVIEW OF SYSTEMS:  Gen: no changes in weight  HEENT: no rhinorrhea  Neck: no sore throat  Cards: no chest pain  Resp: + dyspnea with exertion  GI: no nausea or vomiting or diarrhea  : no dysuria or hematuria  Vascular: + LE edema  Derm: no rashes  Neuro: no numbness/tingling, + RLE pain    No Known Allergies      Home Medications Reviewed  Hospital Medications:   MEDICATIONS  (STANDING):  acetaminophen     Tablet .. 975 milliGRAM(s) Oral once  budesonide 160 MICROgram(s)/formoterol 4.5 MICROgram(s) Inhaler 2 Puff(s) Inhalation two times a day  ferrous    sulfate 325 milliGRAM(s) Oral daily  heparin   Injectable 5000 Unit(s) SubCutaneous every 8 hours  hydrALAZINE 25 milliGRAM(s) Oral every 8 hours  lidocaine   4% Patch 1 Patch Transdermal every 24 hours  metoprolol succinate ER 25 milliGRAM(s) Oral daily  pantoprazole    Tablet 40 milliGRAM(s) Oral before breakfast  simvastatin 10 milliGRAM(s) Oral at bedtime      PAST MEDICAL & SURGICAL HISTORY:  Atrial fibrillation  S/P Ablation    Pulmonary hypertension    MIGUEL (obstructive sleep apnea)    COPD (chronic obstructive pulmonary disease)  on home O2    CHF (congestive heart failure)    HTN (hypertension)    Gout    Mitral valve replaced    Tricuspid valve replaced    CHF (congestive heart failure)    Hypertension    COPD (chronic obstructive pulmonary disease)    History of mitral valve replacement with mechanical valve    Tricuspid valve replaced  mechanical    No significant past surgical history        FAMILY HISTORY:  Family history of hypertension (Father, Sibling)    Family history of stroke    Family history of hypertension (Mother)        SOCIAL HISTORY:  Denies toxic substance use     VITALS:  T(F): 98.2 (12-25-21 @ 05:17), Max: 98.5 (12-24-21 @ 21:36)  HR: 74 (12-25-21 @ 05:17)  BP: 107/57 (12-25-21 @ 05:17)  RR: 20 (12-25-21 @ 05:17)  SpO2: 97% (12-25-21 @ 05:17)  Wt(kg): --    12-24 @ 07:01  -  12-25 @ 07:00  --------------------------------------------------------  IN: 330 mL / OUT: 0 mL / NET: 330 mL          PHYSICAL EXAM:  Gen: NAD, calm  HEENT: MMM  Neck: no JVD  Cards: RRR, +S1/S2, no M/G/R  Resp: CTA B/L  GI: soft, NT/ND, NABS  : no CVA tenderness  Vascular: trace LE edema B/L  Derm: no rashes  Neuro: non-focal    LABS:  12-25    140  |  97<L>  |  46<H>  ----------------------------<  127<H>  4.3   |  33<H>  |  1.58<H>    Ca    10.1      25 Dec 2021 07:16  Phos  4.0     12-25  Mg     2.20     12-25    TPro  8.8<H>  /  Alb  3.9  /  TBili  0.5  /  DBili      /  AST  23  /  ALT  11  /  AlkPhos  176<H>  12-23    Creatinine Trend: 1.58 <--, 1.43 <--, 1.42 <--                        8.7    7.99  )-----------( 366      ( 25 Dec 2021 07:16 )             31.0     Urine Studies:        RADIOLOGY & ADDITIONAL STUDIES:  < from: US Duplex Venous Lower Ext Complete, Bilateral (12.23.21 @ 18:18) >  1.  No evidence of deep venous thrombosis in either lower extremity.  2.  22 x 8 mm RIGHT popliteal cyst.    < end of copied text >      < from: Xray Chest 1 View- PORTABLE-Urgent (Xray Chest 1 View- PORTABLE-Urgent .) (12.23.21 @ 09:49) >  IMPRESSION:  Cardiomegaly with clear lungs.    < end of copied text >

## 2021-12-25 NOTE — PROGRESS NOTE ADULT - SUBJECTIVE AND OBJECTIVE BOX
SUBJECTIVE / OVERNIGHT EVENTS: pt seen and examined    TMEDICATIONS  (STANDING):  budesonide 160 MICROgram(s)/formoterol 4.5 MICROgram(s) Inhaler 2 Puff(s) Inhalation two times a day  buMETAnide Injectable 2 milliGRAM(s) IV Push daily  ferrous    sulfate 325 milliGRAM(s) Oral daily  heparin   Injectable 5000 Unit(s) SubCutaneous every 8 hours  hydrALAZINE 25 milliGRAM(s) Oral every 8 hours  lidocaine   4% Patch 1 Patch Transdermal every 24 hours  metoprolol succinate ER 25 milliGRAM(s) Oral daily  pantoprazole    Tablet 40 milliGRAM(s) Oral before breakfast  simvastatin 10 milliGRAM(s) Oral at bedtime    MEDICATIONS  (PRN):  ALBUTerol    90 MICROgram(s) HFA Inhaler 2 Puff(s) Inhalation every 6 hours PRN Shortness of Breath and/or Wheezing  oxymetazoline 0.05% Nasal Spray 2 Spray(s) Nasal every 12 hours PRN Epistaxis  sodium chloride 0.65% Nasal 1 Spray(s) Both Nostrils every 8 hours PRN Nasal Congestion    Vital Signs Last 24 Hrs  T(C): 36.7 (12-25-21 @ 09:48), Max: 36.9 (12-24-21 @ 21:36)  T(F): 98.1 (12-25-21 @ 09:48), Max: 98.5 (12-24-21 @ 21:36)  HR: 70 (12-25-21 @ 13:38) (61 - 79)  BP: 150/80 (12-25-21 @ 13:38) (105/56 - 150/80)  BP(mean): --  RR: 20 (12-25-21 @ 13:38) (20 - 20)  SpO2: 100% (12-25-21 @ 13:38) (97% - 100%)      Constitutional: No fever, fatigue  Skin: No rash.  Eyes: No recent vision problems or eye pain.  ENT: No congestion, ear pain, or sore throat.  Cardiovascular: No chest pain or palpation.  Respiratory: No cough, shortness of breath, congestion, or wheezing.  Gastrointestinal: No abdominal pain, nausea, vomiting, or diarrhea.  Genitourinary: No dysuria.  Musculoskeletal: No joint swelling.  Neurologic: No headache.    PHYSICAL EXAM:  GENERAL: NAD  EYES: EOMI, PERRLA  NECK: Supple, No JVD  CHEST/LUNG: dec breath sounds at bases  HEART:  S1 , S2 +  ABDOMEN: soft , bs+  EXTREMITIES: + edema   NEUROLOGY:alert awake    LABS:  12-25    140  |  97<L>  |  46<H>  ----------------------------<  127<H>  4.3   |  33<H>  |  1.58<H>    Ca    10.1      25 Dec 2021 07:16  Phos  4.0     12-25  Mg     2.20     12-25      Creatinine Trend: 1.58 <--, 1.43 <--, 1.42 <--                        8.7    7.99  )-----------( 366      ( 25 Dec 2021 07:16 )             31.0     Urine Studies:                     PTT - ( 23 Dec 2021 10:35 )  PTT:38.4 sec

## 2021-12-25 NOTE — CONSULT NOTE ADULT - ASSESSMENT
71y Female with history of COPD, CHF presents with dizziness. Nephrology consulted for elevated Scr.    1) CKD-3b: Baseline Scr 1.4-1.6. Renal function at baseline. Outpatient CKD work up. Avoid nephrotoxins. Monitor electrolytes.    2) HTN with CKD: BP controlled. Continue with current medications. Monitor BP.    3) LE edema: Improving. Continue with bumex 2 mg IV daily. Will convert to PO once patient asymptomatic. Prior TTE with grossly normal LVSF. Monitor UO.    4) Dizziness: Check orthostatics. As per primary team.      [facility] NEPHROLOGY  Rodrigue Amador M.D.  Rob Perez D.O.  Ana Song M.D.  Lucrecia López, MSN, ANP-C    Telephone: [phone]  Facsimile: [phone]    [address] [place] [address]       71y Female with history of COPD, CHF presents with dizziness. Nephrology consulted for elevated Scr.    1) CKD-3b: Baseline Scr 1.4-1.6. Renal function at baseline. Outpatient CKD work up. Avoid nephrotoxins. Monitor electrolytes.    2) HTN with CKD: BP controlled. Continue with current medications. Monitor BP.    3) LE edema: Improving. Continue with bumex 2 mg IV daily. Will convert to PO once patient asymptomatic. Elevated serum CO2 likely due to compensated respiratory acidosis and not metabolic alkalosis but would check blood gas in AM to confirm. Prior TTE with grossly normal LVSF. Monitor UO.    4) Anemia: Check AM iron stores. Will consider CHACORTA pending results. Monitor Hb.      [facility] NEPHROLOGY  Rodrigue Amador M.D.  Rob Perez D.O.  Ana Song M.D.  Lucrecia López, MSN, ANP-C    Telephone: [phone]  Facsimile: [phone]    [address] [place] [id number]

## 2021-12-25 NOTE — PROGRESS NOTE ADULT - SUBJECTIVE AND OBJECTIVE BOX
Jonas Frias MD  Interventional Cardiology / Advance Heart Failure and Cardiac Transplant Specialist  [place] Office : [address] [place] [address] [place] [address]  Tel:   [place] Office : [address] [facility] [place] [address]  Tel: [phone]  Cell : [phone]    Pt is lying in bed comfortable not in distress, no chest pains some SOB no palpitations  	  MEDICATIONS:  buMETAnide Injectable 2 milliGRAM(s) IV Push daily  heparin   Injectable 5000 Unit(s) SubCutaneous every 8 hours  hydrALAZINE 25 milliGRAM(s) Oral every 8 hours  metoprolol succinate ER 25 milliGRAM(s) Oral daily      ALBUTerol    90 MICROgram(s) HFA Inhaler 2 Puff(s) Inhalation every 6 hours PRN  budesonide 160 MICROgram(s)/formoterol 4.5 MICROgram(s) Inhaler 2 Puff(s) Inhalation two times a day      pantoprazole    Tablet 40 milliGRAM(s) Oral before breakfast    simvastatin 10 milliGRAM(s) Oral at bedtime    ferrous    sulfate 325 milliGRAM(s) Oral daily  lidocaine   4% Patch 1 Patch Transdermal every 24 hours  oxymetazoline 0.05% Nasal Spray 2 Spray(s) Nasal every 12 hours PRN  sodium chloride 0.65% Nasal 1 Spray(s) Both Nostrils every 8 hours PRN      PAST MEDICAL/SURGICAL HISTORY  PAST MEDICAL & SURGICAL HISTORY:  Atrial fibrillation  S/P Ablation    Pulmonary hypertension    MIGUEL (obstructive sleep apnea)    COPD (chronic obstructive pulmonary disease)  on home O2    CHF (congestive heart failure)    HTN (hypertension)    Gout    Mitral valve replaced    Tricuspid valve replaced    CHF (congestive heart failure)    Hypertension    COPD (chronic obstructive pulmonary disease)    History of mitral valve replacement with mechanical valve    Tricuspid valve replaced  mechanical    No significant past surgical history        SOCIAL HISTORY: Substance Use (street drugs): ( x ) never used  (  ) other:    FAMILY HISTORY:  Family history of hypertension (Father, Sibling)    Family history of stroke    Family history of hypertension (Mother)              PHYSICAL EXAM:  T(C): 36.7 (12-25-21 @ 09:48), Max: 36.9 (12-24-21 @ 21:36)  HR: 70 (12-25-21 @ 13:38) (61 - 79)  BP: 150/80 (12-25-21 @ 13:38) (105/56 - 150/80)  RR: 20 (12-25-21 @ 13:38) (20 - 20)  SpO2: 100% (12-25-21 @ 13:38) (97% - 100%)  Wt(kg): --  I&O's Summary    24 Dec 2021 07:01  -  25 Dec 2021 07:00  --------------------------------------------------------  IN: 330 mL / OUT: 0 mL / NET: 330 mL             EYES:   PERRLA   ENMT:   Moist mucous membranes, Good dentition, No lesions  Cardiovascular: Normal S1 S2, No JVD, No murmurs, No edema  Respiratory: b/l rhonchi  Gastrointestinal:  Soft, Non-tender, + BS	  Extremities: 2+ edema                                    8.7    7.99  )-----------( 366      ( 25 Dec 2021 07:16 )             31.0     12-25    140  |  97<L>  |  46<H>  ----------------------------<  127<H>  4.3   |  33<H>  |  1.58<H>    Ca    10.1      25 Dec 2021 07:16  Phos  4.0     12-25  Mg     2.20     12-25      proBNP:   Lipid Profile:   HgA1c:   TSH:     Consultant(s) Notes Reviewed:  [x ] YES  [ ] NO    Care Discussed with Consultants/Other Providers [ x] YES  [ ] NO    Imaging Personally Reviewed independently:  [x] YES  [ ] NO    All labs, radiologic studies, vitals, orders and medications list reviewed. Patient is seen and examined at bedside. Case discussed with medical team.

## 2021-12-26 LAB
ANION GAP SERPL CALC-SCNC: 11 MMOL/L — SIGNIFICANT CHANGE UP (ref 7–14)
BASE EXCESS BLDV CALC-SCNC: 7.8 MMOL/L — HIGH (ref -2–3)
BUN SERPL-MCNC: 62 MG/DL — HIGH (ref 7–23)
CALCIUM SERPL-MCNC: 9.9 MG/DL — SIGNIFICANT CHANGE UP (ref 8.4–10.5)
CHLORIDE SERPL-SCNC: 94 MMOL/L — LOW (ref 98–107)
CO2 BLDV-SCNC: 38.2 MMOL/L — HIGH (ref 22–26)
CO2 SERPL-SCNC: 33 MMOL/L — HIGH (ref 22–31)
CREAT ?TM UR-MCNC: 153 MG/DL — SIGNIFICANT CHANGE UP
CREAT SERPL-MCNC: 2.37 MG/DL — HIGH (ref 0.5–1.3)
FERRITIN SERPL-MCNC: 89 NG/ML — SIGNIFICANT CHANGE UP (ref 15–150)
GLUCOSE SERPL-MCNC: 103 MG/DL — HIGH (ref 70–99)
HCO3 BLDV-SCNC: 36 MMOL/L — HIGH (ref 22–29)
HCT VFR BLD CALC: 28.2 % — LOW (ref 34.5–45)
HGB BLD-MCNC: 8.2 G/DL — LOW (ref 11.5–15.5)
IRON SATN MFR SERPL: 12 % — LOW (ref 14–50)
IRON SATN MFR SERPL: 36 UG/DL — SIGNIFICANT CHANGE UP (ref 30–160)
MAGNESIUM SERPL-MCNC: 2.3 MG/DL — SIGNIFICANT CHANGE UP (ref 1.6–2.6)
MCHC RBC-ENTMCNC: 26.7 PG — LOW (ref 27–34)
MCHC RBC-ENTMCNC: 29.1 GM/DL — LOW (ref 32–36)
MCV RBC AUTO: 91.9 FL — SIGNIFICANT CHANGE UP (ref 80–100)
NRBC # BLD: 0 /100 WBCS — SIGNIFICANT CHANGE UP
NRBC # FLD: 0 K/UL — SIGNIFICANT CHANGE UP
PCO2 BLDV: 67 MMHG — HIGH (ref 39–42)
PH BLDV: 7.34 — SIGNIFICANT CHANGE UP (ref 7.32–7.43)
PHOSPHATE SERPL-MCNC: 4.8 MG/DL — HIGH (ref 2.5–4.5)
PLATELET # BLD AUTO: 390 K/UL — SIGNIFICANT CHANGE UP (ref 150–400)
PO2 BLDV: 51 MMHG — SIGNIFICANT CHANGE UP
POTASSIUM SERPL-MCNC: 4.6 MMOL/L — SIGNIFICANT CHANGE UP (ref 3.5–5.3)
POTASSIUM SERPL-SCNC: 4.6 MMOL/L — SIGNIFICANT CHANGE UP (ref 3.5–5.3)
RBC # BLD: 3.07 M/UL — LOW (ref 3.8–5.2)
RBC # FLD: 15.8 % — HIGH (ref 10.3–14.5)
SAO2 % BLDV: 79.9 % — SIGNIFICANT CHANGE UP
SODIUM SERPL-SCNC: 138 MMOL/L — SIGNIFICANT CHANGE UP (ref 135–145)
TIBC SERPL-MCNC: 311 UG/DL — SIGNIFICANT CHANGE UP (ref 220–430)
UIBC SERPL-MCNC: 275 UG/DL — SIGNIFICANT CHANGE UP (ref 110–370)
UUN UR-MCNC: 516 MG/DL — SIGNIFICANT CHANGE UP
WBC # BLD: 7.97 K/UL — SIGNIFICANT CHANGE UP (ref 3.8–10.5)
WBC # FLD AUTO: 7.97 K/UL — SIGNIFICANT CHANGE UP (ref 3.8–10.5)

## 2021-12-26 PROCEDURE — 93010 ELECTROCARDIOGRAM REPORT: CPT

## 2021-12-26 RX ORDER — IRON SUCROSE 20 MG/ML
200 INJECTION, SOLUTION INTRAVENOUS ONCE
Refills: 0 | Status: COMPLETED | OUTPATIENT
Start: 2021-12-26 | End: 2021-12-26

## 2021-12-26 RX ORDER — OXYCODONE AND ACETAMINOPHEN 5; 325 MG/1; MG/1
1 TABLET ORAL ONCE
Refills: 0 | Status: DISCONTINUED | OUTPATIENT
Start: 2021-12-26 | End: 2021-12-26

## 2021-12-26 RX ADMIN — LIDOCAINE 1 PATCH: 4 CREAM TOPICAL at 11:36

## 2021-12-26 RX ADMIN — Medication 25 MILLIGRAM(S): at 21:30

## 2021-12-26 RX ADMIN — Medication 25 MILLIGRAM(S): at 06:15

## 2021-12-26 RX ADMIN — HEPARIN SODIUM 5000 UNIT(S): 5000 INJECTION INTRAVENOUS; SUBCUTANEOUS at 11:51

## 2021-12-26 RX ADMIN — BUMETANIDE 2 MILLIGRAM(S): 0.25 INJECTION INTRAMUSCULAR; INTRAVENOUS at 07:05

## 2021-12-26 RX ADMIN — Medication 25 MILLIGRAM(S): at 11:51

## 2021-12-26 RX ADMIN — BUDESONIDE AND FORMOTEROL FUMARATE DIHYDRATE 2 PUFF(S): 160; 4.5 AEROSOL RESPIRATORY (INHALATION) at 00:48

## 2021-12-26 RX ADMIN — OXYCODONE AND ACETAMINOPHEN 1 TABLET(S): 5; 325 TABLET ORAL at 01:47

## 2021-12-26 RX ADMIN — PANTOPRAZOLE SODIUM 40 MILLIGRAM(S): 20 TABLET, DELAYED RELEASE ORAL at 07:01

## 2021-12-26 RX ADMIN — LIDOCAINE 1 PATCH: 4 CREAM TOPICAL at 00:46

## 2021-12-26 RX ADMIN — SIMVASTATIN 10 MILLIGRAM(S): 20 TABLET, FILM COATED ORAL at 00:47

## 2021-12-26 RX ADMIN — HEPARIN SODIUM 5000 UNIT(S): 5000 INJECTION INTRAVENOUS; SUBCUTANEOUS at 06:15

## 2021-12-26 RX ADMIN — BUDESONIDE AND FORMOTEROL FUMARATE DIHYDRATE 2 PUFF(S): 160; 4.5 AEROSOL RESPIRATORY (INHALATION) at 11:50

## 2021-12-26 RX ADMIN — Medication 325 MILLIGRAM(S): at 11:51

## 2021-12-26 RX ADMIN — LIDOCAINE 1 PATCH: 4 CREAM TOPICAL at 21:30

## 2021-12-26 RX ADMIN — BUDESONIDE AND FORMOTEROL FUMARATE DIHYDRATE 2 PUFF(S): 160; 4.5 AEROSOL RESPIRATORY (INHALATION) at 21:30

## 2021-12-26 RX ADMIN — IRON SUCROSE 220 MILLIGRAM(S): 20 INJECTION, SOLUTION INTRAVENOUS at 17:29

## 2021-12-26 RX ADMIN — SIMVASTATIN 10 MILLIGRAM(S): 20 TABLET, FILM COATED ORAL at 21:30

## 2021-12-26 RX ADMIN — HEPARIN SODIUM 5000 UNIT(S): 5000 INJECTION INTRAVENOUS; SUBCUTANEOUS at 21:30

## 2021-12-26 NOTE — PHYSICAL THERAPY INITIAL EVALUATION ADULT - PATIENT/FAMILY/SIGNIFICANT OTHER GOALS STATEMENT, PT EVAL
Addended by: RACHEL BLUM on: 10/24/2019 04:07 PM     Modules accepted: Orders     Patient wish to get better

## 2021-12-26 NOTE — PROGRESS NOTE ADULT - ASSESSMENT
71y Female with history of COPD, CHF presents with dizziness. Nephrology consulted for elevated Scr.    1) MISAEL: secondary to diuresis? Check UA, urine urea and urine creatinine. Check bladder scan. Hold bumex for now. Avoid nephrotoxins.    2) CKD-3b: Baseline Scr 1.4-1.6. Outpatient CKD work up. Monitor electrolytes.    3) HTN with CKD: BP controlled. Continue with current medications. Monitor BP.    4) LE edema: Improving. Discontinue bumex 2 mg IV daily given MISAEL. Elevated serum CO2 due to compensated respiratory acidosis as per blood gas. Prior TTE with grossly normal LVSF. Monitor UO.    5) Anemia: Hb low with iron deficiency. Will give venofer 200 mg IV dose 1/3 today. Monitor Hb.      [facility] NEPHROLOGY  Rodrigue Amador M.D.  Rob Perez D.O.  Ana Song M.D.  Lucrecia López, MSN, ANP-C    Telephone: [phone]  Facsimile: [phone]    [address] [place] [address]

## 2021-12-26 NOTE — PHYSICAL THERAPY INITIAL EVALUATION ADULT - PERTINENT HX OF CURRENT PROBLEM, REHAB EVAL
Patient is 71 year old female admitted with history of HTN, dCHF, MV and TV replacement, MIGUEL not on CPAP due to unable to afford, afib s/p ablation not on AC due to chronic bleeding, unable to do c-scope or endoscopy due to medical conditions, COPD on 3 L NC, here for dizziness. x1 week, feeling generally weakness .

## 2021-12-26 NOTE — PROGRESS NOTE ADULT - SUBJECTIVE AND OBJECTIVE BOX
[facility] NEPHROLOGY- PROGRESS NOTE    71y Female with history of COPD, CHF presents with dizziness. Nephrology consulted for elevated Scr.    REVIEW OF SYSTEMS:  Gen: no changes in weight  Cards: no chest pain  Resp: + dyspnea with exertion  GI: no nausea or vomiting or diarrhea  Vascular: + LE edema improving    No Known Allergies      Hospital Medications: Medications reviewed    VITALS:  T(F): 98.1 (12-26-21 @ 11:48), Max: 98.4 (12-26-21 @ 06:32)  HR: 71 (12-26-21 @ 11:48)  BP: 115/57 (12-26-21 @ 11:48)  RR: 20 (12-26-21 @ 11:48)  SpO2: 98% (12-26-21 @ 11:48)  Wt(kg): --  Height (cm): 162.6 (12-23 @ 09:17)      PHYSICAL EXAM:    Gen: NAD, calm  Cards: RRR, +S1/S2, no M/G/R  Resp: CTA B/L  GI: soft, NT/ND, NABS  Vascular: no LE edema B/L    LABS:  12-26    138  |  94<L>  |  62<H>  ----------------------------<  103<H>  4.6   |  33<H>  |  2.37<H>    Ca    9.9      26 Dec 2021 08:31  Phos  4.8     12-26  Mg     2.30     12-26      Creatinine Trend: 2.37 <--, 1.58 <--, 1.43 <--, 1.42 <--                        8.2    7.97  )-----------( 390      ( 26 Dec 2021 08:31 )             28.2     Urine Studies:        RADIOLOGY & ADDITIONAL STUDIES:

## 2021-12-27 LAB
ANION GAP SERPL CALC-SCNC: 11 MMOL/L — SIGNIFICANT CHANGE UP (ref 7–14)
APPEARANCE UR: ABNORMAL
BACTERIA # UR AUTO: ABNORMAL
BILIRUB UR-MCNC: NEGATIVE — SIGNIFICANT CHANGE UP
BUN SERPL-MCNC: 63 MG/DL — HIGH (ref 7–23)
CALCIUM SERPL-MCNC: 9.8 MG/DL — SIGNIFICANT CHANGE UP (ref 8.4–10.5)
CHLORIDE SERPL-SCNC: 92 MMOL/L — LOW (ref 98–107)
CO2 SERPL-SCNC: 31 MMOL/L — SIGNIFICANT CHANGE UP (ref 22–31)
COLOR SPEC: YELLOW — SIGNIFICANT CHANGE UP
CREAT SERPL-MCNC: 1.98 MG/DL — HIGH (ref 0.5–1.3)
DIFF PNL FLD: NEGATIVE — SIGNIFICANT CHANGE UP
EPI CELLS # UR: 10 /HPF — HIGH (ref 0–5)
GLUCOSE SERPL-MCNC: 145 MG/DL — HIGH (ref 70–99)
GLUCOSE UR QL: NEGATIVE — SIGNIFICANT CHANGE UP
HCT VFR BLD CALC: 31.1 % — LOW (ref 34.5–45)
HGB BLD-MCNC: 8.8 G/DL — LOW (ref 11.5–15.5)
HYALINE CASTS # UR AUTO: 2 /LPF — SIGNIFICANT CHANGE UP (ref 0–7)
KETONES UR-MCNC: NEGATIVE — SIGNIFICANT CHANGE UP
LEUKOCYTE ESTERASE UR-ACNC: ABNORMAL
MAGNESIUM SERPL-MCNC: 2.5 MG/DL — SIGNIFICANT CHANGE UP (ref 1.6–2.6)
MCHC RBC-ENTMCNC: 26.5 PG — LOW (ref 27–34)
MCHC RBC-ENTMCNC: 28.3 GM/DL — LOW (ref 32–36)
MCV RBC AUTO: 93.7 FL — SIGNIFICANT CHANGE UP (ref 80–100)
NITRITE UR-MCNC: NEGATIVE — SIGNIFICANT CHANGE UP
NRBC # BLD: 0 /100 WBCS — SIGNIFICANT CHANGE UP
NRBC # FLD: 0 K/UL — SIGNIFICANT CHANGE UP
PH UR: 5.5 — SIGNIFICANT CHANGE UP (ref 5–8)
PHOSPHATE SERPL-MCNC: 3.7 MG/DL — SIGNIFICANT CHANGE UP (ref 2.5–4.5)
PLATELET # BLD AUTO: 380 K/UL — SIGNIFICANT CHANGE UP (ref 150–400)
POTASSIUM SERPL-MCNC: 5.8 MMOL/L — HIGH (ref 3.5–5.3)
POTASSIUM SERPL-SCNC: 5.8 MMOL/L — HIGH (ref 3.5–5.3)
PROT UR-MCNC: NEGATIVE — SIGNIFICANT CHANGE UP
RBC # BLD: 3.32 M/UL — LOW (ref 3.8–5.2)
RBC # FLD: 15.8 % — HIGH (ref 10.3–14.5)
RBC CASTS # UR COMP ASSIST: 9 /HPF — HIGH (ref 0–4)
SODIUM SERPL-SCNC: 134 MMOL/L — LOW (ref 135–145)
SP GR SPEC: 1.01 — SIGNIFICANT CHANGE UP (ref 1–1.05)
UROBILINOGEN FLD QL: SIGNIFICANT CHANGE UP
WBC # BLD: 7.74 K/UL — SIGNIFICANT CHANGE UP (ref 3.8–10.5)
WBC # FLD AUTO: 7.74 K/UL — SIGNIFICANT CHANGE UP (ref 3.8–10.5)
WBC UR QL: 8 /HPF — HIGH (ref 0–5)

## 2021-12-27 RX ORDER — IRON SUCROSE 20 MG/ML
200 INJECTION, SOLUTION INTRAVENOUS ONCE
Refills: 0 | Status: COMPLETED | OUTPATIENT
Start: 2021-12-27 | End: 2021-12-27

## 2021-12-27 RX ADMIN — PANTOPRAZOLE SODIUM 40 MILLIGRAM(S): 20 TABLET, DELAYED RELEASE ORAL at 05:59

## 2021-12-27 RX ADMIN — Medication 25 MILLIGRAM(S): at 18:09

## 2021-12-27 RX ADMIN — LIDOCAINE 1 PATCH: 4 CREAM TOPICAL at 23:47

## 2021-12-27 RX ADMIN — HEPARIN SODIUM 5000 UNIT(S): 5000 INJECTION INTRAVENOUS; SUBCUTANEOUS at 06:01

## 2021-12-27 RX ADMIN — Medication 25 MILLIGRAM(S): at 05:59

## 2021-12-27 RX ADMIN — Medication 25 MILLIGRAM(S): at 23:01

## 2021-12-27 RX ADMIN — HEPARIN SODIUM 5000 UNIT(S): 5000 INJECTION INTRAVENOUS; SUBCUTANEOUS at 18:09

## 2021-12-27 RX ADMIN — Medication 325 MILLIGRAM(S): at 12:44

## 2021-12-27 RX ADMIN — SIMVASTATIN 10 MILLIGRAM(S): 20 TABLET, FILM COATED ORAL at 21:01

## 2021-12-27 RX ADMIN — LIDOCAINE 1 PATCH: 4 CREAM TOPICAL at 07:22

## 2021-12-27 RX ADMIN — IRON SUCROSE 220 MILLIGRAM(S): 20 INJECTION, SOLUTION INTRAVENOUS at 12:44

## 2021-12-27 RX ADMIN — LIDOCAINE 1 PATCH: 4 CREAM TOPICAL at 09:43

## 2021-12-27 RX ADMIN — BUDESONIDE AND FORMOTEROL FUMARATE DIHYDRATE 2 PUFF(S): 160; 4.5 AEROSOL RESPIRATORY (INHALATION) at 09:47

## 2021-12-27 RX ADMIN — HEPARIN SODIUM 5000 UNIT(S): 5000 INJECTION INTRAVENOUS; SUBCUTANEOUS at 23:01

## 2021-12-27 RX ADMIN — BUDESONIDE AND FORMOTEROL FUMARATE DIHYDRATE 2 PUFF(S): 160; 4.5 AEROSOL RESPIRATORY (INHALATION) at 21:01

## 2021-12-27 NOTE — PROGRESS NOTE ADULT - SUBJECTIVE AND OBJECTIVE BOX
SUBJECTIVE / OVERNIGHT EVENTS: pt seen and examined    MEDICATIONS  (STANDING):  budesonide 160 MICROgram(s)/formoterol 4.5 MICROgram(s) Inhaler 2 Puff(s) Inhalation two times a day  ferrous    sulfate 325 milliGRAM(s) Oral daily  heparin   Injectable 5000 Unit(s) SubCutaneous every 8 hours  hydrALAZINE 25 milliGRAM(s) Oral every 8 hours  lidocaine   4% Patch 1 Patch Transdermal every 24 hours  metoprolol succinate ER 25 milliGRAM(s) Oral daily  pantoprazole    Tablet 40 milliGRAM(s) Oral before breakfast  simvastatin 10 milliGRAM(s) Oral at bedtime    MEDICATIONS  (PRN):  ALBUTerol    90 MICROgram(s) HFA Inhaler 2 Puff(s) Inhalation every 6 hours PRN Shortness of Breath and/or Wheezing  oxymetazoline 0.05% Nasal Spray 2 Spray(s) Nasal every 12 hours PRN Epistaxis  sodium chloride 0.65% Nasal 1 Spray(s) Both Nostrils every 8 hours PRN Nasal Congestion    Vital Signs Last 24 Hrs  T(C): 36.7 (21 @ 21:00), Max: 36.7 (21 @ 21:00)  T(F): 98 (21 @ 21:00), Max: 98 (21 @ 21:00)  HR: 70 (21 @ 21:00) (61 - 71)  BP: 137/66 (21 @ 21:00) (116/44 - 137/66)  BP(mean): --  RR: 18 (21 @ 21:00) (17 - 19)  SpO2: 98% (21 @ 21:00) (95% - 100%)        Constitutional: No fever, fatigue  Skin: No rash.  Eyes: No recent vision problems or eye pain.  ENT: No congestion, ear pain, or sore throat.  Cardiovascular: No chest pain or palpation.  Respiratory: No cough, shortness of breath, congestion, or wheezing.  Gastrointestinal: No abdominal pain, nausea, vomiting, or diarrhea.  Genitourinary: No dysuria.  Musculoskeletal: No joint swelling.  Neurologic: No headache.    PHYSICAL EXAM:  GENERAL: NAD  EYES: EOMI, PERRLA  NECK: Supple, No JVD  CHEST/LUNG: dec breath sounds at bases  HEART:  S1 , S2 +  ABDOMEN: soft , bs+  EXTREMITIES: + edema   NEUROLOGY:alert awake    LABS:      134<L>  |  92<L>  |  63<H>  ----------------------------<  145<H>  5.8<H>   |  31  |  1.98<H>    Ca    9.8      27 Dec 2021 15:01  Phos  3.7       Mg     2.50           Creatinine Trend: 1.98 <--, 2.37 <--, 1.58 <--, 1.43 <--, 1.42 <--                        8.8    7.74  )-----------( 380      ( 27 Dec 2021 15:01 )             31.1     Urine Studies:  Urinalysis Basic - ( 27 Dec 2021 13:20 )    Color: Yellow / Appearance: Slightly Turbid / S.015 / pH:   Gluc:  / Ketone: Negative  / Bili: Negative / Urobili: <2 mg/dL   Blood:  / Protein: Negative / Nitrite: Negative   Leuk Esterase: Large / RBC: 9 /HPF / WBC 8 /HPF   Sq Epi:  / Non Sq Epi: 10 /HPF / Bacteria: Few      Creatinine, Random Urine: 153 mg/dL ( @ 16:17)

## 2021-12-27 NOTE — PROGRESS NOTE ADULT - SUBJECTIVE AND OBJECTIVE BOX
Jonas Frias MD  Interventional Cardiology / Endovascular Specialist  [place] Office : [address] [place] [address] [place]. [address]  Tel:   [place] Office : [address] [facility] [place] [address]  Tel: [phone]  Cell : [phone]    Subjective/Overnight events: Patient lying in bed comfortably. No acute distress.   	  MEDICATIONS:  heparin   Injectable 5000 Unit(s) SubCutaneous every 8 hours  hydrALAZINE 25 milliGRAM(s) Oral every 8 hours  metoprolol succinate ER 25 milliGRAM(s) Oral daily      ALBUTerol    90 MICROgram(s) HFA Inhaler 2 Puff(s) Inhalation every 6 hours PRN  budesonide 160 MICROgram(s)/formoterol 4.5 MICROgram(s) Inhaler 2 Puff(s) Inhalation two times a day      pantoprazole    Tablet 40 milliGRAM(s) Oral before breakfast    simvastatin 10 milliGRAM(s) Oral at bedtime    ferrous    sulfate 325 milliGRAM(s) Oral daily  iron sucrose IVPB 200 milliGRAM(s) IV Intermittent once  lidocaine   4% Patch 1 Patch Transdermal every 24 hours  oxymetazoline 0.05% Nasal Spray 2 Spray(s) Nasal every 12 hours PRN  sodium chloride 0.65% Nasal 1 Spray(s) Both Nostrils every 8 hours PRN      PAST MEDICAL/SURGICAL HISTORY  PAST MEDICAL & SURGICAL HISTORY:  Atrial fibrillation  S/P Ablation    Pulmonary hypertension    MIGUEL (obstructive sleep apnea)    COPD (chronic obstructive pulmonary disease)  on home O2    CHF (congestive heart failure)    HTN (hypertension)    Gout    Mitral valve replaced    Tricuspid valve replaced    CHF (congestive heart failure)    Hypertension    COPD (chronic obstructive pulmonary disease)    History of mitral valve replacement with mechanical valve    Tricuspid valve replaced  mechanical    No significant past surgical history        SOCIAL HISTORY: Substance Use (street drugs): ( x ) never used  (  ) other:    FAMILY HISTORY:  Family history of hypertension (Father, Sibling)    Family history of stroke    Family history of hypertension (Mother)            PHYSICAL EXAM:  T(C): 36.6 (12-27-21 @ 05:56), Max: 36.8 (12-26-21 @ 21:28)  HR: 70 (12-27-21 @ 05:56) (65 - 71)  BP: 116/44 (12-27-21 @ 05:56) (115/57 - 129/62)  RR: 19 (12-27-21 @ 08:30) (17 - 20)  SpO2: 95% (12-27-21 @ 08:30) (95% - 100%)  Wt(kg): --  I&O's Summary    26 Dec 2021 07:01  -  27 Dec 2021 07:00  --------------------------------------------------------  IN: 1100 mL / OUT: 0 mL / NET: 1100 mL          EYES:   PERRLA   ENMT:   Moist mucous membranes, Good dentition, No lesions  Cardiovascular: Normal S1 S2, No JVD, No murmurs, No edema  Respiratory: b/l rhonchi   Gastrointestinal:  Soft, Non-tender, + BS	  Extremities: 1+ edema                                        8.2    7.97  )-----------( 390      ( 26 Dec 2021 08:31 )             28.2     12-26    138  |  94<L>  |  62<H>  ----------------------------<  103<H>  4.6   |  33<H>  |  2.37<H>    Ca    9.9      26 Dec 2021 08:31  Phos  4.8     12-26  Mg     2.30     12-26      proBNP:   Lipid Profile:   HgA1c:   TSH:     Consultant(s) Notes Reviewed:  [x ] YES  [ ] NO    Care Discussed with Consultants/Other Providers [ x] YES  [ ] NO    Imaging Personally Reviewed independently:  [x] YES  [ ] NO    All labs, radiologic studies, vitals, orders and medications list reviewed. Patient is seen and examined at bedside. Case discussed with medical team.

## 2021-12-27 NOTE — PROGRESS NOTE ADULT - ASSESSMENT
71y Female with history of COPD, CHF presents with dizziness. Nephrology consulted for elevated Scr.    1) MISAEL: secondary to diuresis? Check UA. FeUrea low. Bladder scan negative. Holding bumex for now. Avoid nephrotoxins.    2) CKD-3b: Baseline Scr 1.4-1.6. Outpatient CKD work up. Monitor electrolytes.    3) HTN with CKD: BP controlled. Continue with current medications. Monitor BP.    4) LE edema: Improving. Holding bumex 2 mg IV daily given MISAEL. Elevated serum CO2 due to compensated respiratory acidosis as per blood gas. Prior TTE with grossly normal LVSF. Monitor UO.    5) Anemia: Hb low with iron deficiency. Will give venofer 200 mg IV dose 2/3 today. Monitor Hb.      [facility] NEPHROLOGY  Rodrigue Amador M.D.  Rob Perez D.O.  Ana Song M.D.  Lucrecia López, MSN, ANP-C    Telephone: [phone]  Facsimile: [phone]    [address] [place] [address]

## 2021-12-27 NOTE — PROGRESS NOTE ADULT - SUBJECTIVE AND OBJECTIVE BOX
[facility] NEPHROLOGY- PROGRESS NOTE    71y Female with history of COPD, CHF presents with dizziness. Nephrology consulted for elevated Scr.    REVIEW OF SYSTEMS:  Gen: no changes in weight  Cards: no chest pain  Resp: + dyspnea with exertion  GI: no nausea or vomiting or diarrhea  Vascular: + LE edema improving    No Known Allergies      Hospital Medications: Medications reviewed    VITALS:  T(F): 97.8 (12-27-21 @ 05:56), Max: 98.2 (12-26-21 @ 21:28)  HR: 70 (12-27-21 @ 05:56)  BP: 116/44 (12-27-21 @ 05:56)  RR: 19 (12-27-21 @ 08:30)  SpO2: 95% (12-27-21 @ 08:30)  Wt(kg): --    12-26 @ 07:01  -  12-27 @ 07:00  --------------------------------------------------------  IN: 1100 mL / OUT: 0 mL / NET: 1100 mL      PHYSICAL EXAM:    Gen: NAD, calm  Cards: RRR, +S1/S2, no M/G/R  Resp: CTA B/L  GI: soft, NT/ND, NABS  Vascular: no LE edema B/L      LABS:  12-26    138  |  94<L>  |  62<H>  ----------------------------<  103<H>  4.6   |  33<H>  |  2.37<H>    Ca    9.9      26 Dec 2021 08:31  Phos  4.8     12-26  Mg     2.30     12-26      Creatinine Trend: 2.37 <--, 1.58 <--, 1.43 <--, 1.42 <--                        8.2    7.97  )-----------( 390      ( 26 Dec 2021 08:31 )             28.2     Urine Studies:    Creatinine, Random Urine: 153 mg/dL (12-26 @ 16:17)

## 2021-12-27 NOTE — PROGRESS NOTE ADULT - ASSESSMENT
71 year old hx of HTN, dCHF, MV and TV replacement, MIGUEL not on CPAP due to unable to afford, afib s/p ablation not on AC due to chronic bleeding, unable to do c-scope or endoscopy due to medical conditions, COPD on 3 L NC, here for dizziness. x1 week, feeling generally weakness .    EKG - NSR RBBB  Tele - episodes of Afib   Echo in 9/21 shows normal LV s/p MVR       1) Acute on chronic diastolic CHF   -bumex 2mg IV daily held 2/2 MISAEL  -echo in 9/21 shows normal LV s/p MVR  - monitor urine out put, daily weights , strict I/O, 1.5 liter fluid restriction     2) COPD   - on O2     3) Afib   - not on AC, pt doesnt remember why , as per chart sec to GI bleed  -cont toprol     4) MISAEL  -f/u renal recs  -bumex held    5) DVT prophylaxis   - on sc heparin  71 year old hx of HTN, dCHF, MV and TV replacement, MIGUEL not on CPAP due to unable to afford, afib s/p ablation not on AC due to chronic bleeding, unable to do c-scope or endoscopy due to medical conditions, COPD on 3 L NC, here for dizziness. x1 week, feeling generally weakness .    EKG - NSR RBBB  Tele - episodes of Afib   Echo in 9/21 shows normal LV s/p MVR       1) Acute on chronic diastolic CHF   -bumex 2mg IV daily held 2/2 MISAEL  -echo in 9/21 shows normal LV s/p MVR  - monitor urine out put, daily weights , strict I/O, 1.5 liter fluid restriction   - get labs today     2) COPD   - on O2     3) Afib   - not on AC, pt doesnt remember why , as per chart sec to GI bleed  -cont toprol     4) MISAEL  -f/u renal recs  -bumex held    5) DVT prophylaxis   - on sc heparin

## 2021-12-28 LAB
ANION GAP SERPL CALC-SCNC: 5 MMOL/L — LOW (ref 7–14)
BUN SERPL-MCNC: 62 MG/DL — HIGH (ref 7–23)
CALCIUM SERPL-MCNC: 10.2 MG/DL — SIGNIFICANT CHANGE UP (ref 8.4–10.5)
CHLORIDE SERPL-SCNC: 95 MMOL/L — LOW (ref 98–107)
CO2 SERPL-SCNC: 35 MMOL/L — HIGH (ref 22–31)
CREAT SERPL-MCNC: 1.75 MG/DL — HIGH (ref 0.5–1.3)
GLUCOSE SERPL-MCNC: 100 MG/DL — HIGH (ref 70–99)
HCT VFR BLD CALC: 30.7 % — LOW (ref 34.5–45)
HGB BLD-MCNC: 8.8 G/DL — LOW (ref 11.5–15.5)
MAGNESIUM SERPL-MCNC: 2.5 MG/DL — SIGNIFICANT CHANGE UP (ref 1.6–2.6)
MCHC RBC-ENTMCNC: 26.4 PG — LOW (ref 27–34)
MCHC RBC-ENTMCNC: 28.7 GM/DL — LOW (ref 32–36)
MCV RBC AUTO: 92.2 FL — SIGNIFICANT CHANGE UP (ref 80–100)
NRBC # BLD: 0 /100 WBCS — SIGNIFICANT CHANGE UP
NRBC # FLD: 0 K/UL — SIGNIFICANT CHANGE UP
PHOSPHATE SERPL-MCNC: 3.2 MG/DL — SIGNIFICANT CHANGE UP (ref 2.5–4.5)
PLATELET # BLD AUTO: 349 K/UL — SIGNIFICANT CHANGE UP (ref 150–400)
POTASSIUM SERPL-MCNC: 4.4 MMOL/L — SIGNIFICANT CHANGE UP (ref 3.5–5.3)
POTASSIUM SERPL-SCNC: 4.4 MMOL/L — SIGNIFICANT CHANGE UP (ref 3.5–5.3)
RBC # BLD: 3.33 M/UL — LOW (ref 3.8–5.2)
RBC # FLD: 15.9 % — HIGH (ref 10.3–14.5)
SODIUM SERPL-SCNC: 135 MMOL/L — SIGNIFICANT CHANGE UP (ref 135–145)
WBC # BLD: 5.64 K/UL — SIGNIFICANT CHANGE UP (ref 3.8–10.5)
WBC # FLD AUTO: 5.64 K/UL — SIGNIFICANT CHANGE UP (ref 3.8–10.5)

## 2021-12-28 PROCEDURE — 99222 1ST HOSP IP/OBS MODERATE 55: CPT

## 2021-12-28 RX ORDER — IRON SUCROSE 20 MG/ML
200 INJECTION, SOLUTION INTRAVENOUS ONCE
Refills: 0 | Status: COMPLETED | OUTPATIENT
Start: 2021-12-28 | End: 2021-12-28

## 2021-12-28 RX ORDER — BUMETANIDE 0.25 MG/ML
2 INJECTION INTRAMUSCULAR; INTRAVENOUS DAILY
Refills: 0 | Status: DISCONTINUED | OUTPATIENT
Start: 2021-12-28 | End: 2022-01-05

## 2021-12-28 RX ADMIN — BUDESONIDE AND FORMOTEROL FUMARATE DIHYDRATE 2 PUFF(S): 160; 4.5 AEROSOL RESPIRATORY (INHALATION) at 21:09

## 2021-12-28 RX ADMIN — HEPARIN SODIUM 5000 UNIT(S): 5000 INJECTION INTRAVENOUS; SUBCUTANEOUS at 21:08

## 2021-12-28 RX ADMIN — SIMVASTATIN 10 MILLIGRAM(S): 20 TABLET, FILM COATED ORAL at 21:08

## 2021-12-28 RX ADMIN — LIDOCAINE 1 PATCH: 4 CREAM TOPICAL at 12:00

## 2021-12-28 RX ADMIN — LIDOCAINE 1 PATCH: 4 CREAM TOPICAL at 22:35

## 2021-12-28 RX ADMIN — HEPARIN SODIUM 5000 UNIT(S): 5000 INJECTION INTRAVENOUS; SUBCUTANEOUS at 13:03

## 2021-12-28 RX ADMIN — Medication 25 MILLIGRAM(S): at 05:01

## 2021-12-28 RX ADMIN — BUMETANIDE 2 MILLIGRAM(S): 0.25 INJECTION INTRAMUSCULAR; INTRAVENOUS at 17:55

## 2021-12-28 RX ADMIN — Medication 325 MILLIGRAM(S): at 11:17

## 2021-12-28 RX ADMIN — BUDESONIDE AND FORMOTEROL FUMARATE DIHYDRATE 2 PUFF(S): 160; 4.5 AEROSOL RESPIRATORY (INHALATION) at 08:36

## 2021-12-28 RX ADMIN — PANTOPRAZOLE SODIUM 40 MILLIGRAM(S): 20 TABLET, DELAYED RELEASE ORAL at 05:01

## 2021-12-28 RX ADMIN — LIDOCAINE 1 PATCH: 4 CREAM TOPICAL at 07:00

## 2021-12-28 RX ADMIN — Medication 25 MILLIGRAM(S): at 13:03

## 2021-12-28 RX ADMIN — HEPARIN SODIUM 5000 UNIT(S): 5000 INJECTION INTRAVENOUS; SUBCUTANEOUS at 05:01

## 2021-12-28 RX ADMIN — IRON SUCROSE 220 MILLIGRAM(S): 20 INJECTION, SOLUTION INTRAVENOUS at 16:49

## 2021-12-28 RX ADMIN — Medication 25 MILLIGRAM(S): at 21:08

## 2021-12-28 NOTE — PROGRESS NOTE ADULT - ASSESSMENT
71 year old hx of HTN, dCHF, MV and TV replacement, MIGUEL not on CPAP due to unable to afford, afib s/p ablation not on AC due to chronic bleeding, unable to do c-scope or endoscopy due to medical conditions, COPD on 3 L NC, here for dizziness. x1 week, feeling generally weakness .    EKG - NSR RBBB  Tele - episodes of Afib   Echo in 9/21 shows normal LV s/p MVR       1) Acute on chronic diastolic CHF   -bumex 2mg IV daily held 2/2 MISAEL  -echo in 9/21 shows normal LV s/p MVR  - agree with starting bumex   - monitor urine out put, daily weights      2) COPD   - on O2     3) Afib   - not on AC, pt doesnt remember why , as per chart sec to GI bleed  -cont toprol     4) MISAEL  -f/u renal recs  -bumex held    5) DVT prophylaxis   - on sc heparin

## 2021-12-28 NOTE — PROGRESS NOTE ADULT - ASSESSMENT
71y Female with history of COPD, CHF presents with dizziness. Nephrology consulted for elevated Scr.    1) MISAEL: secondary to diuresis. Scr improving. UA active however contaminated and doubt patient with underlying GN. FeUrea low. Bladder scan negative. Avoid nephrotoxins.    2) CKD-3b: Baseline Scr 1.4-1.6. Outpatient CKD work up. Monitor electrolytes.    3) HTN with CKD: BP controlled. Continue with current medications. Monitor BP.    4) LE edema: Improving. Can resume bumex 2 mg PO daily. Elevated serum CO2 due to compensated respiratory acidosis as per blood gas. Prior TTE with grossly normal LVSF. Monitor UO.    5) Anemia: Hb low with iron deficiency. Will give venofer 200 mg IV dose 3/3 today. Monitor Hb.      [facility] NEPHROLOGY  Rodrigue Amador M.D.  Rob Perez D.O.  Ana Song M.D.  Lucrecia López, MSN, ANP-C    Telephone: [phone]  Facsimile: [phone]    [address] [place] [address]

## 2021-12-28 NOTE — CONSULT NOTE ADULT - SUBJECTIVE AND OBJECTIVE BOX
Pulmonary Consult Note    DAMARI GUERRERO  MRN-[id number]    Chief Complaint: Patient is a 71y old  Female who presents with a chief complaint of dizziness and sOB (28 Dec 2021 13:46)      HPI:  per chart review:  71 year old hx of HTN, dCHF, MV and TV replacement, MIGUEL not on CPAP due to unable to afford, afib s/p ablation not on AC due to chronic bleeding, unable to do c-scope or endoscopy due to medical conditions, COPD on 3 L NC, here for dizziness. x1 week, feeling generally weakness . walkes with walker at home.    Patient notes no falls or syncope, states she has felt this way before when she had "fluid in lungs" or when she was anemic.   Patient noted  nose bleed or 1 hr yesterday stopped  She was on coumadin 3 months ago for heart valves but was stopped - patient not sure why   She SOB when laying flat, legs more swollen, no cp. Denies fever, cough, recent illness, abdominal pain, melena or hematochezia.  Patient admitted with CHF, treated with bumex in ED, already notes less SOB  Patient noted she has been staying home due to COVID  She notes she did NOT take Covid vaccine- Pat counselled please to take vaccine  -  -  -on my exam:  -states her breathing doesn't feel good.    ROS:  -  -  All other systems reviewed and negative    PAST MEDICAL HISTORY: HEALTH ISSUES - PROBLEM Dx:  Chronic obstructive pulmonary disease, unspecified COPD type    Hypertension    CAD (coronary artery disease)    Prophylactic measure    Edema    High creatinine    Acute on chronic diastolic congestive heart failure            SOCIAL HISTORY: denies smoking hx    ACTIVE MEDICATION LIST:  MEDICATIONS  (STANDING):  budesonide 160 MICROgram(s)/formoterol 4.5 MICROgram(s) Inhaler 2 Puff(s) Inhalation two times a day  buMETAnide 2 milliGRAM(s) Oral daily  ferrous    sulfate 325 milliGRAM(s) Oral daily  heparin   Injectable 5000 Unit(s) SubCutaneous every 8 hours  hydrALAZINE 25 milliGRAM(s) Oral every 8 hours  iron sucrose IVPB 200 milliGRAM(s) IV Intermittent once  lidocaine   4% Patch 1 Patch Transdermal every 24 hours  metoprolol succinate ER 25 milliGRAM(s) Oral daily  pantoprazole    Tablet 40 milliGRAM(s) Oral before breakfast  simvastatin 10 milliGRAM(s) Oral at bedtime    MEDICATIONS  (PRN):  ALBUTerol    90 MICROgram(s) HFA Inhaler 2 Puff(s) Inhalation every 6 hours PRN Shortness of Breath and/or Wheezing  oxymetazoline 0.05% Nasal Spray 2 Spray(s) Nasal every 12 hours PRN Epistaxis  sodium chloride 0.65% Nasal 1 Spray(s) Both Nostrils every 8 hours PRN Nasal Congestion      EXAM:  Vital Signs Last 24 Hrs  T(C): 36.3 (28 Dec 2021 10:30), Max: 36.7 (27 Dec 2021 21:00)  T(F): 97.4 (28 Dec 2021 10:30), Max: 98 (27 Dec 2021 21:00)  HR: 110 (28 Dec 2021 13:02) (68 - 110)  BP: 125/74 (28 Dec 2021 13:02) (115/55 - 138/76)  BP(mean): --  RR: 20 (28 Dec 2021 10:30) (18 - 20)  SpO2: 95% (28 Dec 2021 10:30) (95% - 98%)  GENERAL: No acute distress  NEURO: Alert and oriented x 3  LUNGS: crackles bilaterally  CV: S1/S2,  EXTREMITIES: + edema    LABS/IMAGING: reviewed                        8.8    5.64  )-----------( 349      ( 28 Dec 2021 06:39 )             30.7     12-28    135  |  95<L>  |  62<H>  ----------------------------<  100<H>  4.4   |  35<H>  |  1.75<H>    Ca    10.2      28 Dec 2021 06:39  Phos  3.2     12-28  Mg     2.50     12-28      PROBLEM LIST:  71yFemale with HEALTH ISSUES - PROBLEM Dx:  Chronic obstructive pulmonary disease, unspecified COPD type    Hypertension    CAD (coronary artery disease)    Prophylactic measure    Edema    High creatinine    Acute on chronic diastolic congestive heart failure      RECS:  -Appreciate cardiology, cont diuresis  -obtain abg  -start on CPAP 10 qhs, if elevated CO2 put on bipap 15/10  -cont inhalers, no formal PFTs to review, pt reports hx of copd was never a smoker.      Thank you for this consultation, please feel free to call with any questions [phone]  Em Merida MD Pulmonary Consult Note    DAMARI GUERRERO  MRN-[id number]    Chief Complaint: Patient is a 71y old  Female who presents with a chief complaint of dizziness and sOB (28 Dec 2021 13:46)      HPI:  per chart review:  71 year old hx of HTN, dCHF, MV and TV replacement, MIGUEL not on CPAP due to unable to afford, afib s/p ablation not on AC due to chronic bleeding, unable to do c-scope or endoscopy due to medical conditions, COPD on 3 L NC, here for dizziness. x1 week, feeling generally weakness . walkes with walker at home.    Patient notes no falls or syncope, states she has felt this way before when she had "fluid in lungs" or when she was anemic.   Patient noted  nose bleed or 1 hr yesterday stopped  She was on coumadin 3 months ago for heart valves but was stopped - patient not sure why   She SOB when laying flat, legs more swollen, no cp. Denies fever, cough, recent illness, abdominal pain, melena or hematochezia.  Patient admitted with CHF, treated with bumex in ED, already notes less SOB  Patient noted she has been staying home due to COVID  She notes she did NOT take Covid vaccine- Pat counselled please to take vaccine  -  -  -on my exam:  -states her breathing doesn't feel good.    ROS:  -  -  All other systems reviewed and negative    PAST MEDICAL HISTORY: HEALTH ISSUES - PROBLEM Dx:  Chronic obstructive pulmonary disease, unspecified COPD type    Hypertension    CAD (coronary artery disease)    Prophylactic measure    Edema    High creatinine    Acute on chronic diastolic congestive heart failure            SOCIAL HISTORY: denies smoking hx    ACTIVE MEDICATION LIST:  MEDICATIONS  (STANDING):  budesonide 160 MICROgram(s)/formoterol 4.5 MICROgram(s) Inhaler 2 Puff(s) Inhalation two times a day  buMETAnide 2 milliGRAM(s) Oral daily  ferrous    sulfate 325 milliGRAM(s) Oral daily  heparin   Injectable 5000 Unit(s) SubCutaneous every 8 hours  hydrALAZINE 25 milliGRAM(s) Oral every 8 hours  iron sucrose IVPB 200 milliGRAM(s) IV Intermittent once  lidocaine   4% Patch 1 Patch Transdermal every 24 hours  metoprolol succinate ER 25 milliGRAM(s) Oral daily  pantoprazole    Tablet 40 milliGRAM(s) Oral before breakfast  simvastatin 10 milliGRAM(s) Oral at bedtime    MEDICATIONS  (PRN):  ALBUTerol    90 MICROgram(s) HFA Inhaler 2 Puff(s) Inhalation every 6 hours PRN Shortness of Breath and/or Wheezing  oxymetazoline 0.05% Nasal Spray 2 Spray(s) Nasal every 12 hours PRN Epistaxis  sodium chloride 0.65% Nasal 1 Spray(s) Both Nostrils every 8 hours PRN Nasal Congestion      EXAM:  Vital Signs Last 24 Hrs  T(C): 36.3 (28 Dec 2021 10:30), Max: 36.7 (27 Dec 2021 21:00)  T(F): 97.4 (28 Dec 2021 10:30), Max: 98 (27 Dec 2021 21:00)  HR: 110 (28 Dec 2021 13:02) (68 - 110)  BP: 125/74 (28 Dec 2021 13:02) (115/55 - 138/76)  BP(mean): --  RR: 20 (28 Dec 2021 10:30) (18 - 20)  SpO2: 95% (28 Dec 2021 10:30) (95% - 98%)  GENERAL: No acute distress  NEURO: Alert and oriented x 3  LUNGS: crackles bilaterally  CV: S1/S2,  EXTREMITIES: + edema    LABS/IMAGING: reviewed                        8.8    5.64  )-----------( 349      ( 28 Dec 2021 06:39 )             30.7     12-28    135  |  95<L>  |  62<H>  ----------------------------<  100<H>  4.4   |  35<H>  |  1.75<H>    Ca    10.2      28 Dec 2021 06:39  Phos  3.2     12-28  Mg     2.50     12-28      PROBLEM LIST:  71yFemale with HEALTH ISSUES - PROBLEM Dx:  Chronic obstructive pulmonary disease, unspecified COPD type    Hypertension    CAD (coronary artery disease)    Prophylactic measure    Edema    High creatinine    Acute on chronic diastolic congestive heart failure      RECS:  -Appreciate cardiology, cont diuresis  -start on CPAP 10 qhs  -cont inhalers, no formal PFTs to review, pt reports hx of copd was never a smoker.      Thank you for this consultation, please feel free to call with any questions [phone]  Em Merida MD Pulmonary Consult Note    DAMARI GUERRERO  MRN-[id number]    Chief Complaint: Patient is a 71y old  Female who presents with a chief complaint of dizziness and sOB (28 Dec 2021 13:46)      HPI:  per chart review:  71 year old hx of HTN, dCHF, MV and TV replacement, MIGUEL not on CPAP due to unable to afford, afib s/p ablation not on AC due to chronic bleeding, unable to do c-scope or endoscopy due to medical conditions, COPD on 3 L NC, here for dizziness. x1 week, feeling generally weakness . walkes with walker at home.    Patient notes no falls or syncope, states she has felt this way before when she had "fluid in lungs" or when she was anemic.   Patient noted  nose bleed or 1 hr yesterday stopped  She was on coumadin 3 months ago for heart valves but was stopped - patient not sure why   She SOB when laying flat, legs more swollen, no cp. Denies fever, cough, recent illness, abdominal pain, melena or hematochezia.  Patient admitted with CHF, treated with bumex in ED, already notes less SOB  Patient noted she has been staying home due to COVID  She notes she did NOT take Covid vaccine- Pat counselled please to take vaccine  -  -  -on my exam:  -states her breathing doesn't feel good.    ROS:  -  -  All other systems reviewed and negative    PAST MEDICAL HISTORY: HEALTH ISSUES - PROBLEM Dx:  Chronic obstructive pulmonary disease, unspecified COPD type    Hypertension    CAD (coronary artery disease)    Prophylactic measure    Edema    High creatinine    Acute on chronic diastolic congestive heart failure            SOCIAL HISTORY: denies smoking hx    ACTIVE MEDICATION LIST:  MEDICATIONS  (STANDING):  budesonide 160 MICROgram(s)/formoterol 4.5 MICROgram(s) Inhaler 2 Puff(s) Inhalation two times a day  buMETAnide 2 milliGRAM(s) Oral daily  ferrous    sulfate 325 milliGRAM(s) Oral daily  heparin   Injectable 5000 Unit(s) SubCutaneous every 8 hours  hydrALAZINE 25 milliGRAM(s) Oral every 8 hours  iron sucrose IVPB 200 milliGRAM(s) IV Intermittent once  lidocaine   4% Patch 1 Patch Transdermal every 24 hours  metoprolol succinate ER 25 milliGRAM(s) Oral daily  pantoprazole    Tablet 40 milliGRAM(s) Oral before breakfast  simvastatin 10 milliGRAM(s) Oral at bedtime    MEDICATIONS  (PRN):  ALBUTerol    90 MICROgram(s) HFA Inhaler 2 Puff(s) Inhalation every 6 hours PRN Shortness of Breath and/or Wheezing  oxymetazoline 0.05% Nasal Spray 2 Spray(s) Nasal every 12 hours PRN Epistaxis  sodium chloride 0.65% Nasal 1 Spray(s) Both Nostrils every 8 hours PRN Nasal Congestion      EXAM:  Vital Signs Last 24 Hrs  T(C): 36.3 (28 Dec 2021 10:30), Max: 36.7 (27 Dec 2021 21:00)  T(F): 97.4 (28 Dec 2021 10:30), Max: 98 (27 Dec 2021 21:00)  HR: 110 (28 Dec 2021 13:02) (68 - 110)  BP: 125/74 (28 Dec 2021 13:02) (115/55 - 138/76)  BP(mean): --  RR: 20 (28 Dec 2021 10:30) (18 - 20)  SpO2: 95% (28 Dec 2021 10:30) (95% - 98%)  GENERAL: No acute distress  NEURO: Alert and oriented x 3  LUNGS: crackles bilaterally  CV: S1/S2,  EXTREMITIES: + edema    LABS/IMAGING: reviewed                        8.8    5.64  )-----------( 349      ( 28 Dec 2021 06:39 )             30.7     12-28    135  |  95<L>  |  62<H>  ----------------------------<  100<H>  4.4   |  35<H>  |  1.75<H>    Ca    10.2      28 Dec 2021 06:39  Phos  3.2     12-28  Mg     2.50     12-28      PROBLEM LIST:  71yFemale with HEALTH ISSUES - PROBLEM Dx:  Chronic obstructive pulmonary disease, unspecified COPD type    Hypertension    CAD (coronary artery disease)    Prophylactic measure    Edema    High creatinine    Acute on chronic diastolic congestive heart failure      RECS:  -Appreciate cardiology, cont diuresis  -start bipap qhs and prn if sleeping or sob  -cont inhalers, no formal PFTs to review, pt reports hx of copd was never a smoker.      Thank you for this consultation, please feel free to call with any questions [phone]  Em Merida MD

## 2021-12-28 NOTE — PROGRESS NOTE ADULT - SUBJECTIVE AND OBJECTIVE BOX
[facility] NEPHROLOGY- PROGRESS NOTE    71y Female with history of COPD, CHF presents with dizziness. Nephrology consulted for elevated Scr.    REVIEW OF SYSTEMS:  Gen: no changes in weight  Cards: no chest pain  Resp: + dyspnea with exertion  GI: no nausea or vomiting or diarrhea  Vascular: + LE edema improving    No Known Allergies      Hospital Medications: Medications reviewed    VITALS:  T(F): 97.4 (21 @ 10:30), Max: 98 (21 @ 21:00)  HR: 110 (21 @ 13:02)  BP: 125/74 (21 @ 13:02)  RR: 20 (21 @ 10:30)  SpO2: 95% (21 @ 10:30)  Wt(kg): --     @ 07:01  -   @ 07:00  --------------------------------------------------------  IN: 0 mL / OUT: 150 mL / NET: -150 mL     @ 07:01  -   @ 13:47  --------------------------------------------------------  IN: 0 mL / OUT: 250 mL / NET: -250 mL      PHYSICAL EXAM:    Gen: NAD, calm  Cards: RRR, +S1/S2, no M/G/R  Resp: poor airway entry bilaterally, + scattered wheezing  GI: soft, NT/ND, NABS  Vascular: trace LE edema B/L        LABS:      135  |  95<L>  |  62<H>  ----------------------------<  100<H>  4.4   |  35<H>  |  1.75<H>    Ca    10.2      28 Dec 2021 06:39  Phos  3.2       Mg     2.50           Creatinine Trend: 1.75 <--, 1.98 <--, 2.37 <--, 1.58 <--, 1.43 <--, 1.42 <--                        8.8    5.64  )-----------( 349      ( 28 Dec 2021 06:39 )             30.7     Urine Studies:  Urinalysis Basic - ( 27 Dec 2021 13:20 )    Color: Yellow / Appearance: Slightly Turbid / S.015 / pH:   Gluc:  / Ketone: Negative  / Bili: Negative / Urobili: <2 mg/dL   Blood:  / Protein: Negative / Nitrite: Negative   Leuk Esterase: Large / RBC: 9 /HPF / WBC 8 /HPF   Sq Epi:  / Non Sq Epi: 10 /HPF / Bacteria: Few      Creatinine, Random Urine: 153 mg/dL (- @ 16:17)

## 2021-12-28 NOTE — PROGRESS NOTE ADULT - SUBJECTIVE AND OBJECTIVE BOX
SUBJECTIVE / OVERNIGHT EVENTS: pt seen and examined    MEDICATIONS  (STANDING):  budesonide 160 MICROgram(s)/formoterol 4.5 MICROgram(s) Inhaler 2 Puff(s) Inhalation two times a day  buMETAnide 2 milliGRAM(s) Oral daily  ferrous    sulfate 325 milliGRAM(s) Oral daily  heparin   Injectable 5000 Unit(s) SubCutaneous every 8 hours  hydrALAZINE 25 milliGRAM(s) Oral every 8 hours  lidocaine   4% Patch 1 Patch Transdermal every 24 hours  metoprolol succinate ER 25 milliGRAM(s) Oral daily  pantoprazole    Tablet 40 milliGRAM(s) Oral before breakfast  simvastatin 10 milliGRAM(s) Oral at bedtime    MEDICATIONS  (PRN):  ALBUTerol    90 MICROgram(s) HFA Inhaler 2 Puff(s) Inhalation every 6 hours PRN Shortness of Breath and/or Wheezing  oxymetazoline 0.05% Nasal Spray 2 Spray(s) Nasal every 12 hours PRN Epistaxis  sodium chloride 0.65% Nasal 1 Spray(s) Both Nostrils every 8 hours PRN Nasal Congestion    Vital Signs Last 24 Hrs  T(C): 36.6 (21 @ 21:05), Max: 36.6 (21 @ 04:55)  T(F): 97.8 (21 @ 21:05), Max: 97.9 (21 @ 04:55)  HR: 68 (21 @ 21:05) (68 - 110)  BP: 147/71 (21 @ 21:05) (115/55 - 147/71)  BP(mean): --  RR: 19 (21 @ 21:05) (18 - 20)  SpO2: 99% (21 @ 21:05) (95% - 99%)        Constitutional: No fever, fatigue  Skin: No rash.  Eyes: No recent vision problems or eye pain.  ENT: No congestion, ear pain, or sore throat.  Cardiovascular: No chest pain or palpation.  Respiratory: No cough, shortness of breath, congestion, or wheezing.  Gastrointestinal: No abdominal pain, nausea, vomiting, or diarrhea.  Genitourinary: No dysuria.  Musculoskeletal: No joint swelling.  Neurologic: No headache.    PHYSICAL EXAM:  GENERAL: NAD  EYES: EOMI, PERRLA  NECK: Supple, No JVD  CHEST/LUNG: dec breath sounds at bases  HEART:  S1 , S2 +  ABDOMEN: soft , bs+  EXTREMITIES: + edema   NEUROLOGY:alert awake    LABS:      135  |  95<L>  |  62<H>  ----------------------------<  100<H>  4.4   |  35<H>  |  1.75<H>    Ca    10.2      28 Dec 2021 06:39  Phos  3.2       Mg     2.50           Creatinine Trend: 1.75 <--, 1.98 <--, 2.37 <--, 1.58 <--, 1.43 <--, 1.42 <--                        8.8    5.64  )-----------( 349      ( 28 Dec 2021 06:39 )             30.7     Urine Studies:  Urinalysis Basic - ( 27 Dec 2021 13:20 )    Color: Yellow / Appearance: Slightly Turbid / S.015 / pH:   Gluc:  / Ketone: Negative  / Bili: Negative / Urobili: <2 mg/dL   Blood:  / Protein: Negative / Nitrite: Negative   Leuk Esterase: Large / RBC: 9 /HPF / WBC 8 /HPF   Sq Epi:  / Non Sq Epi: 10 /HPF / Bacteria: Few      Creatinine, Random Urine: 153 mg/dL ( @ 16:17)

## 2021-12-29 ENCOUNTER — TRANSCRIPTION ENCOUNTER (OUTPATIENT)
Age: 71
End: 2021-12-29

## 2021-12-29 LAB
ANION GAP SERPL CALC-SCNC: 9 MMOL/L — SIGNIFICANT CHANGE UP (ref 7–14)
BUN SERPL-MCNC: 62 MG/DL — HIGH (ref 7–23)
CALCIUM SERPL-MCNC: 10.5 MG/DL — SIGNIFICANT CHANGE UP (ref 8.4–10.5)
CHLORIDE SERPL-SCNC: 95 MMOL/L — LOW (ref 98–107)
CO2 SERPL-SCNC: 33 MMOL/L — HIGH (ref 22–31)
CREAT SERPL-MCNC: 1.79 MG/DL — HIGH (ref 0.5–1.3)
GLUCOSE SERPL-MCNC: 89 MG/DL — SIGNIFICANT CHANGE UP (ref 70–99)
HCT VFR BLD CALC: 30.4 % — LOW (ref 34.5–45)
HGB BLD-MCNC: 8.7 G/DL — LOW (ref 11.5–15.5)
MAGNESIUM SERPL-MCNC: 2.5 MG/DL — SIGNIFICANT CHANGE UP (ref 1.6–2.6)
MCHC RBC-ENTMCNC: 26.8 PG — LOW (ref 27–34)
MCHC RBC-ENTMCNC: 28.6 GM/DL — LOW (ref 32–36)
MCV RBC AUTO: 93.5 FL — SIGNIFICANT CHANGE UP (ref 80–100)
NRBC # BLD: 0 /100 WBCS — SIGNIFICANT CHANGE UP
NRBC # FLD: 0.02 K/UL — HIGH
PHOSPHATE SERPL-MCNC: 3.2 MG/DL — SIGNIFICANT CHANGE UP (ref 2.5–4.5)
PLATELET # BLD AUTO: 367 K/UL — SIGNIFICANT CHANGE UP (ref 150–400)
POTASSIUM SERPL-MCNC: 4.6 MMOL/L — SIGNIFICANT CHANGE UP (ref 3.5–5.3)
POTASSIUM SERPL-SCNC: 4.6 MMOL/L — SIGNIFICANT CHANGE UP (ref 3.5–5.3)
RBC # BLD: 3.25 M/UL — LOW (ref 3.8–5.2)
RBC # FLD: 15.9 % — HIGH (ref 10.3–14.5)
SODIUM SERPL-SCNC: 137 MMOL/L — SIGNIFICANT CHANGE UP (ref 135–145)
WBC # BLD: 7.29 K/UL — SIGNIFICANT CHANGE UP (ref 3.8–10.5)
WBC # FLD AUTO: 7.29 K/UL — SIGNIFICANT CHANGE UP (ref 3.8–10.5)

## 2021-12-29 RX ORDER — OXYCODONE AND ACETAMINOPHEN 5; 325 MG/1; MG/1
1 TABLET ORAL ONCE
Refills: 0 | Status: DISCONTINUED | OUTPATIENT
Start: 2021-12-29 | End: 2021-12-29

## 2021-12-29 RX ORDER — ERYTHROPOIETIN 10000 [IU]/ML
10000 INJECTION, SOLUTION INTRAVENOUS; SUBCUTANEOUS ONCE
Refills: 0 | Status: DISCONTINUED | OUTPATIENT
Start: 2021-12-29 | End: 2021-12-30

## 2021-12-29 RX ADMIN — BUDESONIDE AND FORMOTEROL FUMARATE DIHYDRATE 2 PUFF(S): 160; 4.5 AEROSOL RESPIRATORY (INHALATION) at 08:48

## 2021-12-29 RX ADMIN — Medication 25 MILLIGRAM(S): at 13:06

## 2021-12-29 RX ADMIN — Medication 25 MILLIGRAM(S): at 05:07

## 2021-12-29 RX ADMIN — LIDOCAINE 1 PATCH: 4 CREAM TOPICAL at 11:29

## 2021-12-29 RX ADMIN — LIDOCAINE 1 PATCH: 4 CREAM TOPICAL at 07:35

## 2021-12-29 RX ADMIN — BUMETANIDE 2 MILLIGRAM(S): 0.25 INJECTION INTRAMUSCULAR; INTRAVENOUS at 05:07

## 2021-12-29 RX ADMIN — SIMVASTATIN 10 MILLIGRAM(S): 20 TABLET, FILM COATED ORAL at 23:58

## 2021-12-29 RX ADMIN — ALBUTEROL 2 PUFF(S): 90 AEROSOL, METERED ORAL at 10:20

## 2021-12-29 RX ADMIN — Medication 325 MILLIGRAM(S): at 13:06

## 2021-12-29 RX ADMIN — ALBUTEROL 2 PUFF(S): 90 AEROSOL, METERED ORAL at 22:02

## 2021-12-29 RX ADMIN — Medication 25 MILLIGRAM(S): at 22:02

## 2021-12-29 RX ADMIN — BUDESONIDE AND FORMOTEROL FUMARATE DIHYDRATE 2 PUFF(S): 160; 4.5 AEROSOL RESPIRATORY (INHALATION) at 22:02

## 2021-12-29 RX ADMIN — OXYCODONE AND ACETAMINOPHEN 1 TABLET(S): 5; 325 TABLET ORAL at 22:02

## 2021-12-29 RX ADMIN — LIDOCAINE 1 PATCH: 4 CREAM TOPICAL at 23:58

## 2021-12-29 RX ADMIN — Medication 40 MILLIGRAM(S): at 13:05

## 2021-12-29 RX ADMIN — HEPARIN SODIUM 5000 UNIT(S): 5000 INJECTION INTRAVENOUS; SUBCUTANEOUS at 22:02

## 2021-12-29 RX ADMIN — OXYCODONE AND ACETAMINOPHEN 1 TABLET(S): 5; 325 TABLET ORAL at 23:02

## 2021-12-29 RX ADMIN — Medication 30 MILLILITER(S): at 18:50

## 2021-12-29 RX ADMIN — HEPARIN SODIUM 5000 UNIT(S): 5000 INJECTION INTRAVENOUS; SUBCUTANEOUS at 05:07

## 2021-12-29 RX ADMIN — HEPARIN SODIUM 5000 UNIT(S): 5000 INJECTION INTRAVENOUS; SUBCUTANEOUS at 13:06

## 2021-12-29 RX ADMIN — PANTOPRAZOLE SODIUM 40 MILLIGRAM(S): 20 TABLET, DELAYED RELEASE ORAL at 05:07

## 2021-12-29 NOTE — PROGRESS NOTE ADULT - SUBJECTIVE AND OBJECTIVE BOX
Jonas Frias MD  Interventional Cardiology / Endovascular Specialist  [place] Office : [address] [place] [address] [address]. [address]  Tel:   [place] Office : [address] [facility] [place] [address]  Tel: [phone]  Cell : [phone]    Subjective/Overnight events: Patient lying in bed comfortably. No acute distress.   	  MEDICATIONS:  buMETAnide 2 milliGRAM(s) Oral daily  heparin   Injectable 5000 Unit(s) SubCutaneous every 8 hours  hydrALAZINE 25 milliGRAM(s) Oral every 8 hours  metoprolol succinate ER 25 milliGRAM(s) Oral daily      ALBUTerol    90 MICROgram(s) HFA Inhaler 2 Puff(s) Inhalation every 6 hours PRN  budesonide 160 MICROgram(s)/formoterol 4.5 MICROgram(s) Inhaler 2 Puff(s) Inhalation two times a day      pantoprazole    Tablet 40 milliGRAM(s) Oral before breakfast    simvastatin 10 milliGRAM(s) Oral at bedtime    epoetin livia-epbx (RETACRIT) Injectable 98868 Unit(s) SubCutaneous once  ferrous    sulfate 325 milliGRAM(s) Oral daily  lidocaine   4% Patch 1 Patch Transdermal every 24 hours  oxymetazoline 0.05% Nasal Spray 2 Spray(s) Nasal every 12 hours PRN  sodium chloride 0.65% Nasal 1 Spray(s) Both Nostrils every 8 hours PRN      PAST MEDICAL/SURGICAL HISTORY  PAST MEDICAL & SURGICAL HISTORY:  Atrial fibrillation  S/P Ablation    Pulmonary hypertension    MIGUEL (obstructive sleep apnea)    COPD (chronic obstructive pulmonary disease)  on home O2    CHF (congestive heart failure)    HTN (hypertension)    Gout    Mitral valve replaced    Tricuspid valve replaced    CHF (congestive heart failure)    Hypertension    COPD (chronic obstructive pulmonary disease)    History of mitral valve replacement with mechanical valve    Tricuspid valve replaced  mechanical    No significant past surgical history        SOCIAL HISTORY: Substance Use (street drugs): ( x ) never used  (  ) other:    FAMILY HISTORY:  Family history of hypertension (Father, Sibling)    Family history of stroke    Family history of hypertension (Mother)          PHYSICAL EXAM:  T(C): 36.7 (12-29-21 @ 05:05), Max: 36.7 (12-29-21 @ 05:05)  HR: 65 (12-29-21 @ 07:18) (63 - 110)  BP: 128/62 (12-29-21 @ 05:05) (125/74 - 147/71)  RR: 18 (12-29-21 @ 05:05) (18 - 20)  SpO2: 96% (12-29-21 @ 07:18) (96% - 100%)  Wt(kg): --  I&O's Summary    28 Dec 2021 07:01  -  29 Dec 2021 07:00  --------------------------------------------------------  IN: 0 mL / OUT: 550 mL / NET: -550 mL            EYES:   PERRLA   ENMT:   Moist mucous membranes, Good dentition, No lesions  Cardiovascular: Normal S1 S2, No JVD, No murmurs, No edema  Respiratory: b/l rhonchi and wheezing  Gastrointestinal:  Soft, Non-tender, + BS	  Extremities: 1+ edema                                  8.7    7.29  )-----------( 367      ( 29 Dec 2021 07:13 )             30.4     12-29    137  |  95<L>  |  62<H>  ----------------------------<  89  4.6   |  33<H>  |  1.79<H>    Ca    10.5      29 Dec 2021 07:12  Phos  3.2     12-29  Mg     2.50     12-29      proBNP:   Lipid Profile:   HgA1c:   TSH:     Consultant(s) Notes Reviewed:  [x ] YES  [ ] NO    Care Discussed with Consultants/Other Providers [ x] YES  [ ] NO    Imaging Personally Reviewed independently:  [x] YES  [ ] NO    All labs, radiologic studies, vitals, orders and medications list reviewed. Patient is seen and examined at bedside. Case discussed with medical team.

## 2021-12-29 NOTE — DISCHARGE NOTE PROVIDER - NSDCMRMEDTOKEN_GEN_ALL_CORE_FT
budesonide-formoterol 160 mcg-4.5 mcg/inh inhalation aerosol: 2 puff(s) inhaled 2 times a day   bumetanide 2 mg oral tablet: 1 tab(s) orally once a day  ferrous sulfate 324 mg (65 mg elemental iron) oral tablet: 1 tab(s) orally once a day  hydrALAZINE 25 mg oral tablet: 1 tab(s) orally 3 times a day  Metoprolol Succinate ER 25 mg oral tablet, extended release: 1 tab(s) orally once a day  ProAir HFA 90 mcg/inh inhalation aerosol: 2 puff(s) inhaled every 6 hours  Protonix 40 mg oral delayed release tablet: 1 tab(s) orally once a day   simvastatin 10 mg oral tablet: 1 tab(s) orally once a day (at bedtime)  sodium chloride nasal spray: 1 spray(s) nasal every 6 hours   acetaminophen 325 mg oral tablet: 2 tab(s) orally every 6 hours, As needed, Moderate Pain (4 - 6)  aluminum hydroxide-magnesium hydroxide 200 mg-200 mg/5 mL oral suspension: 30 milliliter(s) orally every 4 hours, As needed, Dyspepsia  benzonatate 100 mg oral capsule: 1 cap(s) orally every 8 hours as needed for cough  budesonide 0.5 mg/2 mL inhalation suspension: 1 puff(s) inhaled 2 times a day   budesonide-formoterol 160 mcg-4.5 mcg/inh inhalation aerosol: 2 puff(s) inhaled 2 times a day   bumetanide 1 mg oral tablet: 1 tab(s) orally 2 times a day  ferrous sulfate 324 mg (65 mg elemental iron) oral tablet: 1 tab(s) orally once a day  hydrALAZINE 25 mg oral tablet: 1 tab(s) orally 3 times a day  metoprolol succinate 50 mg oral tablet, extended release: 1 tab(s) orally once a day  predniSONE 10 mg oral tablet: 2 tab(s) orally once a day for one more day on 1/15  1 tab (10mg) once a day for 3 more days starting 1/16  ProAir HFA 90 mcg/inh inhalation aerosol: 2 puff(s) inhaled every 6 hours  Protonix 40 mg oral delayed release tablet: 1 tab(s) orally once a day   simvastatin 10 mg oral tablet: 1 tab(s) orally once a day (at bedtime)  sodium chloride nasal spray: 1 spray(s) nasal every 6 hours   acetaminophen 325 mg oral tablet: 2 tab(s) orally every 6 hours, As needed, Moderate Pain (4 - 6)  aluminum hydroxide-magnesium hydroxide 200 mg-200 mg/5 mL oral suspension: 30 milliliter(s) orally every 4 hours, As needed, Dyspepsia  benzonatate 100 mg oral capsule: 1 cap(s) orally every 8 hours as needed for cough  budesonide 0.5 mg/2 mL inhalation suspension: 1 puff(s) inhaled 2 times a day   budesonide-formoterol 160 mcg-4.5 mcg/inh inhalation aerosol: 2 puff(s) inhaled 2 times a day   bumetanide 1 mg oral tablet: 1 tab(s) orally 2 times a day  ferrous sulfate 324 mg (65 mg elemental iron) oral tablet: 1 tab(s) orally once a day  hydrALAZINE 25 mg oral tablet: 1 tab(s) orally 3 times a day  metoprolol succinate 50 mg oral tablet, extended release: 1 tab(s) orally once a day  predniSONE 10 mg oral tablet: 2 tab(s) orally once a day for one more day on 1/15  1 tab (10mg) once a day for 3 more days starting 1/16  predniSONE 10 mg oral tablet: 2 tab(s) orally once a day for one more day on 1/15  1 tab (10mg) once a day for 3 more days starting 1/16,17.18 then discontinue  ProAir HFA 90 mcg/inh inhalation aerosol: 2 puff(s) inhaled every 6 hours  Protonix 40 mg oral delayed release tablet: 1 tab(s) orally once a day   simvastatin 10 mg oral tablet: 1 tab(s) orally once a day (at bedtime)  sodium chloride nasal spray: 1 spray(s) nasal every 6 hours

## 2021-12-29 NOTE — DISCHARGE NOTE PROVIDER - CARE PROVIDERS DIRECT ADDRESSES
[email or web address]

## 2021-12-29 NOTE — PROGRESS NOTE ADULT - ASSESSMENT
71y Female with history of COPD, CHF presents with dizziness. Nephrology consulted for elevated Scr.    1) MISAEL: secondary to diuresis. Scr improved and near baseline. UA active however contaminated and doubt patient with underlying GN. FeUrea low. Bladder scan negative. Avoid nephrotoxins.    2) CKD-3b: Baseline Scr 1.4-1.6. Outpatient CKD work up. Monitor electrolytes.    3) HTN with CKD: BP controlled. Continue with current medications. Monitor BP.    4) LE edema: Improving. Continue with bumex 2 mg PO daily. Elevated serum CO2 due to compensated respiratory acidosis as per blood gas. Prior TTE with grossly normal LVSF. Monitor UO.    5) Anemia: Hb low with iron deficiency s/p venofer. Will give Epo 10K X 1 dose today. Monitor Hb.      [facility] NEPHROLOGY  Rodrigue Amador M.D.  Rob Perez D.O.  Ana Song M.D.  Lucrecia López, MSN, ANP-C    Telephone: [phone]  Facsimile: [phone]    [address] [place] [address]

## 2021-12-29 NOTE — DISCHARGE NOTE PROVIDER - NSDCCPCAREPLAN_GEN_ALL_CORE_FT
PRINCIPAL DISCHARGE DIAGNOSIS  Diagnosis: CHF exacerbation  Assessment and Plan of Treatment: You were treated for diastolic heart failure with IV Bumex. Continue taking oral Bumex and follow up with your PCP.      SECONDARY DISCHARGE DIAGNOSES  Diagnosis: Chronic obstructive pulmonary disease, unspecified COPD type  Assessment and Plan of Treatment: You will need formal sleep studies.     PRINCIPAL DISCHARGE DIAGNOSIS  Diagnosis: CHF exacerbation  Assessment and Plan of Treatment: You were treated for diastolic heart failure with IV Bumex. Continue taking oral Bumex and follow up with your PCP/cardiologist      SECONDARY DISCHARGE DIAGNOSES  Diagnosis: Chronic obstructive pulmonary disease, unspecified COPD type  Assessment and Plan of Treatment: You will need formal pulmonary function tests. It is important that you follow up with your pulmonologist for further management.     PRINCIPAL DISCHARGE DIAGNOSIS  Diagnosis: CHF exacerbation  Assessment and Plan of Treatment: You were treated for diastolic heart failure with IV Bumex. Continue taking oral Bumex and follow up with your PCP/cardiologist      SECONDARY DISCHARGE DIAGNOSES  Diagnosis: Chronic obstructive pulmonary disease, unspecified COPD type  Assessment and Plan of Treatment: You will need formal pulmonary function tests. It is important that you follow up with your pulmonologist for further management.    Diagnosis: Hypertension  Assessment and Plan of Treatment:     Diagnosis: Infection due to human metapneumovirus (hMPV)  Assessment and Plan of Treatment: Take medications as instructed on discharge  Follow up with your PCP within 1 week     PRINCIPAL DISCHARGE DIAGNOSIS  Diagnosis: CHF exacerbation  Assessment and Plan of Treatment: You were treated for diastolic heart failure with IV Bumex/water pill. Continue taking oral Bumex and follow up with your PCP/cardiologist.      SECONDARY DISCHARGE DIAGNOSES  Diagnosis: Chronic obstructive pulmonary disease, unspecified COPD type  Assessment and Plan of Treatment: You will need formal pulmonary function tests. It is important that you follow up with your pulmonologist for further management. COntinue home oxygen and use AVAPS at night to prevent future hospitilizations. Continue/complete prednisone taper as prescibed.    Diagnosis: Hypertension  Assessment and Plan of Treatment: Continue hydralazine and metoprolol    Diagnosis: Infection due to human metapneumovirus (hMPV)  Assessment and Plan of Treatment: Viral upper respiratory infection, continue supportive care with cough medicine as needed. Take medications as instructed on discharge  Follow up with your PCP within 1 week    Diagnosis: Lung nodule  Assessment and Plan of Treatment: small lung nodule noted, follow up with pulnmonary for monitoring    Diagnosis: Left renal mass  Assessment and Plan of Treatment: incidental finding, follow up outpatient with Urology /kidney doctor for surveillance.

## 2021-12-29 NOTE — PROGRESS NOTE ADULT - SUBJECTIVE AND OBJECTIVE BOX
[facility] NEPHROLOGY- PROGRESS NOTE    71y Female with history of COPD, CHF presents with dizziness. Nephrology consulted for elevated Scr.    REVIEW OF SYSTEMS:  Gen: no changes in weight  Cards: no chest pain  Resp: + dyspnea with exertion  GI: no nausea or vomiting or diarrhea  Vascular: + LE edema improving    No Known Allergies      Hospital Medications: Medications reviewed      VITALS:  T(F): 98 (21 @ 05:05), Max: 98 (21 @ 05:05)  HR: 65 (21 @ 07:18)  BP: 128/62 (21 @ 05:05)  RR: 18 (21 @ 05:05)  SpO2: 96% (21 @ 07:18)  Wt(kg): --     @ 07:01  -   @ 07:00  --------------------------------------------------------  IN: 0 mL / OUT: 550 mL / NET: -550 mL        PHYSICAL EXAM:    Gen: NAD, calm  Cards: RRR, +S1/S2, no M/G/R  Resp: poor airway entry bilaterally, + scattered wheezing  GI: soft, NT/ND, NABS  Vascular: trace LE edema B/L        LABS:      137  |  95<L>  |  62<H>  ----------------------------<  89  4.6   |  33<H>  |  1.79<H>    Ca    10.5      29 Dec 2021 07:12  Phos  3.2       Mg     2.50           Creatinine Trend: 1.79 <--, 1.75 <--, 1.98 <--, 2.37 <--, 1.58 <--, 1.43 <--, 1.42 <--                        8.7    7.29  )-----------( 367      ( 29 Dec 2021 07:13 )             30.4     Urine Studies:  Urinalysis Basic - ( 27 Dec 2021 13:20 )    Color: Yellow / Appearance: Slightly Turbid / S.015 / pH:   Gluc:  / Ketone: Negative  / Bili: Negative / Urobili: <2 mg/dL   Blood:  / Protein: Negative / Nitrite: Negative   Leuk Esterase: Large / RBC: 9 /HPF / WBC 8 /HPF   Sq Epi:  / Non Sq Epi: 10 /HPF / Bacteria: Few      Creatinine, Random Urine: 153 mg/dL (12-26 @ 16:17)

## 2021-12-29 NOTE — DISCHARGE NOTE NURSING/CASE MANAGEMENT/SOCIAL WORK - NSSCTYPOFSERV_GEN_ALL_CORE
Visiting nurse will visit you the day after discharge. The nurse will call you in the morning to set up a visit time. If you do not hear from the nurse, please call the agency. Physical Therapist will follow.

## 2021-12-29 NOTE — DISCHARGE NOTE NURSING/CASE MANAGEMENT/SOCIAL WORK - PATIENT PORTAL LINK FT
You can access the FollowMyHealth Patient Portal offered by [facility] by registering at the following website: [email or web address]. By joining [facility]’s FollowMyHealth portal, you will also be able to view your health information using other applications (apps) compatible with our system.

## 2021-12-29 NOTE — PROGRESS NOTE ADULT - SUBJECTIVE AND OBJECTIVE BOX
SUBJECTIVE / OVERNIGHT EVENTS: pt seen and examined    MEDICATIONS  (STANDING):  budesonide 160 MICROgram(s)/formoterol 4.5 MICROgram(s) Inhaler 2 Puff(s) Inhalation two times a day  buMETAnide 2 milliGRAM(s) Oral daily  epoetin livia-epbx (RETACRIT) Injectable 38043 Unit(s) SubCutaneous once  ferrous    sulfate 325 milliGRAM(s) Oral daily  heparin   Injectable 5000 Unit(s) SubCutaneous every 8 hours  hydrALAZINE 25 milliGRAM(s) Oral every 8 hours  lidocaine   4% Patch 1 Patch Transdermal every 24 hours  metoprolol succinate ER 25 milliGRAM(s) Oral daily  pantoprazole    Tablet 40 milliGRAM(s) Oral before breakfast  predniSONE   Tablet 40 milliGRAM(s) Oral daily  simvastatin 10 milliGRAM(s) Oral at bedtime    MEDICATIONS  (PRN):  ALBUTerol    90 MICROgram(s) HFA Inhaler 2 Puff(s) Inhalation every 6 hours PRN Shortness of Breath and/or Wheezing  aluminum hydroxide/magnesium hydroxide/simethicone Suspension 30 milliLiter(s) Oral every 4 hours PRN Dyspepsia  oxymetazoline 0.05% Nasal Spray 2 Spray(s) Nasal every 12 hours PRN Epistaxis  sodium chloride 0.65% Nasal 1 Spray(s) Both Nostrils every 8 hours PRN Nasal Congestion    Vital Signs Last 24 Hrs  T(C): 36.6 (21 @ 18:03), Max: 36.8 (21 @ 13:04)  T(F): 97.9 (21 @ 18:03), Max: 98.2 (21 @ 13:04)  HR: 69 (21 @ 19:26) (63 - 70)  BP: 156/60 (21 @ 18:03) (128/62 - 156/62)  BP(mean): --  RR: 18 (21 @ 18:03) (18 - 18)  SpO2: 97% (21 @ 19:26) (96% - 100%)        Constitutional: No fever, fatigue  Skin: No rash.  Eyes: No recent vision problems or eye pain.  ENT: No congestion, ear pain, or sore throat.  Cardiovascular: No chest pain or palpation.  Respiratory: No cough, shortness of breath, congestion, or wheezing.  Gastrointestinal: No abdominal pain, nausea, vomiting, or diarrhea.  Genitourinary: No dysuria.  Musculoskeletal: No joint swelling.  Neurologic: No headache.    PHYSICAL EXAM:  GENERAL: NAD  EYES: EOMI, PERRLA  NECK: Supple, No JVD  CHEST/LUNG: dec breath sounds at bases, fine exp wheezing+  HEART:  S1 , S2 +  ABDOMEN: soft , bs+  EXTREMITIES: + edema   NEUROLOGY:alert awake    LABS:      137  |  95<L>  |  62<H>  ----------------------------<  89  4.6   |  33<H>  |  1.79<H>    Ca    10.5      29 Dec 2021 07:12  Phos  3.2       Mg     2.50           Creatinine Trend: 1.79 <--, 1.75 <--, 1.98 <--, 2.37 <--, 1.58 <--, 1.43 <--, 1.42 <--                        8.7    7.29  )-----------( 367      ( 29 Dec 2021 07:13 )             30.4     Urine Studies:  Urinalysis Basic - ( 27 Dec 2021 13:20 )    Color: Yellow / Appearance: Slightly Turbid / S.015 / pH:   Gluc:  / Ketone: Negative  / Bili: Negative / Urobili: <2 mg/dL   Blood:  / Protein: Negative / Nitrite: Negative   Leuk Esterase: Large / RBC: 9 /HPF / WBC 8 /HPF   Sq Epi:  / Non Sq Epi: 10 /HPF / Bacteria: Few      Creatinine, Random Urine: 153 mg/dL ( @ 16:17)

## 2021-12-29 NOTE — DISCHARGE NOTE NURSING/CASE MANAGEMENT/SOCIAL WORK - NSSCCARECORD_GEN_ALL_CORE
----- Message from Elsa Mukul Page sent at 10/5/2020  5:35 PM EDT -----  Regarding: Dr. Uriel Senior Message/Vendor Calls    Caller's first and last name:      Reason for call:  Regarding insulin    Callback required yes/no and why:      Best contact number(s):  [phone]    Details to clarify the request:      [facility]/Community Resource

## 2021-12-29 NOTE — DISCHARGE NOTE PROVIDER - PROVIDER TOKENS
PROVIDER:[TOKEN:[[id number]]],PROVIDER:[TOKEN:[[id number]]] PROVIDER:[TOKEN:[[id number]]],PROVIDER:[TOKEN:[[id number]]],PROVIDER:[TOKEN:[[id number]]]

## 2021-12-29 NOTE — DISCHARGE NOTE NURSING/CASE MANAGEMENT/SOCIAL WORK - NSDCPEFALRISK_GEN_ALL_CORE
For information on Fall & Injury Prevention, visit: [email or web address] OR  [email or web address] OR  https://www.cdc.gov/steadi/patient.html

## 2021-12-29 NOTE — DISCHARGE NOTE PROVIDER - CARE PROVIDER_API CALL
Katia Lance)  Critical Care Medicine; Internal Medicine; Pulmonary Disease  [address] [place] [address]  Phone: [phone]  Fax: [phone]  Follow Up Time:     Em Merida)  Critical Care Medicine; Internal Medicine; Pulmonary Disease  [address] [facility], [address]  [place] [address]  Phone: [phone]  Fax: [phone]  Follow Up Time:    Katia Lance)  Critical Care Medicine; Internal Medicine; Pulmonary Disease  [address] [place] [address]  Phone: [phone]  Fax: [phone]  Follow Up Time:     Em Merida)  Critical Care Medicine; Internal Medicine; Pulmonary Disease  [address] [facility], [address]  [place] [address]  Phone: [phone]  Fax: [phone]  Follow Up Time:     Rob Perez (DO)  Internal Medicine  [address] [place] [address]  Phone: [phone]  Fax: [phone]  Follow Up Time:

## 2021-12-29 NOTE — DISCHARGE NOTE PROVIDER - NSDCFUADDAPPT_GEN_ALL_CORE_FT
Follow up with your primary care physician for further monitoring in 1-2 weeks. Please call to arrange appointment.     Follow up with pulmonary for close monitoring, please call to make an appointment, you will need pulmonary function tests as outpatient.    Follow up with urology in future for monitoring of renal mass

## 2021-12-29 NOTE — PROGRESS NOTE ADULT - ASSESSMENT
71 year old hx of HTN, dCHF, MV and TV replacement, MIGUEL not on CPAP due to unable to afford, afib s/p ablation not on AC due to chronic bleeding, unable to do c-scope or endoscopy due to medical conditions, COPD on 3 L NC, here for dizziness. x1 week, feeling generally weakness .    EKG - NSR RBBB  Tele - episodes of Afib   Echo in 9/21 shows normal LV s/p MVR       1) Acute on chronic diastolic CHF   -bumex 2mg IV daily held 2/2 MISAEL  -echo in 9/21 shows normal LV s/p MVR  - agree with starting bumex   - monitor urine out put, daily weights      2) COPD   - on O2   -recommend steroids and pulm f/u as patient noted to be wheezing    3) Afib   - not on AC, pt doesnt remember why , as per chart sec to GI bleed  -cont toprol     4) MISAEL  -f/u renal recs    5) DVT prophylaxis   - on sc heparin   71 year old hx of HTN, dCHF, MV and TV replacement, MIGUEL not on CPAP due to unable to afford, afib s/p ablation not on AC due to chronic bleeding, unable to do c-scope or endoscopy due to medical conditions, COPD on 3 L NC, here for dizziness. x1 week, feeling generally weakness .    EKG - NSR RBBB  Tele - episodes of Afib   Echo in 9/21 shows normal LV s/p MVR       1) Acute on chronic diastolic CHF   -bumex 2mg po daily   -echo in 9/21 shows normal LV s/p MVR  - agree with starting bumex   - monitor urine out put, daily weights      2) COPD   - on O2   -recommend steroids and pulm f/u as patient noted to be wheezing    3) Afib   - not on AC, pt doesnt remember why , as per chart sec to GI bleed  -cont toprol     4) MISAEL  -f/u renal recs    5) DVT prophylaxis   - on sc heparin

## 2021-12-30 LAB
ANION GAP SERPL CALC-SCNC: 8 MMOL/L — SIGNIFICANT CHANGE UP (ref 7–14)
BUN SERPL-MCNC: 66 MG/DL — HIGH (ref 7–23)
CALCIUM SERPL-MCNC: 10.2 MG/DL — SIGNIFICANT CHANGE UP (ref 8.4–10.5)
CHLORIDE SERPL-SCNC: 96 MMOL/L — LOW (ref 98–107)
CO2 SERPL-SCNC: 32 MMOL/L — HIGH (ref 22–31)
CREAT SERPL-MCNC: 1.8 MG/DL — HIGH (ref 0.5–1.3)
GLUCOSE SERPL-MCNC: 126 MG/DL — HIGH (ref 70–99)
HCT VFR BLD CALC: 28.5 % — LOW (ref 34.5–45)
HGB BLD-MCNC: 8.4 G/DL — LOW (ref 11.5–15.5)
MAGNESIUM SERPL-MCNC: 2.7 MG/DL — HIGH (ref 1.6–2.6)
MCHC RBC-ENTMCNC: 27.4 PG — SIGNIFICANT CHANGE UP (ref 27–34)
MCHC RBC-ENTMCNC: 29.5 GM/DL — LOW (ref 32–36)
MCV RBC AUTO: 92.8 FL — SIGNIFICANT CHANGE UP (ref 80–100)
NRBC # BLD: 0 /100 WBCS — SIGNIFICANT CHANGE UP
NRBC # FLD: 0.03 K/UL — HIGH
PHOSPHATE SERPL-MCNC: 3.4 MG/DL — SIGNIFICANT CHANGE UP (ref 2.5–4.5)
PLATELET # BLD AUTO: 380 K/UL — SIGNIFICANT CHANGE UP (ref 150–400)
POTASSIUM SERPL-MCNC: 5.3 MMOL/L — SIGNIFICANT CHANGE UP (ref 3.5–5.3)
POTASSIUM SERPL-SCNC: 5.3 MMOL/L — SIGNIFICANT CHANGE UP (ref 3.5–5.3)
RBC # BLD: 3.07 M/UL — LOW (ref 3.8–5.2)
RBC # FLD: 16.2 % — HIGH (ref 10.3–14.5)
SARS-COV-2 RNA SPEC QL NAA+PROBE: SIGNIFICANT CHANGE UP
SODIUM SERPL-SCNC: 136 MMOL/L — SIGNIFICANT CHANGE UP (ref 135–145)
WBC # BLD: 6.56 K/UL — SIGNIFICANT CHANGE UP (ref 3.8–10.5)
WBC # FLD AUTO: 6.56 K/UL — SIGNIFICANT CHANGE UP (ref 3.8–10.5)

## 2021-12-30 PROCEDURE — 71045 X-RAY EXAM CHEST 1 VIEW: CPT | Mod: 26

## 2021-12-30 PROCEDURE — 99233 SBSQ HOSP IP/OBS HIGH 50: CPT

## 2021-12-30 RX ORDER — BUMETANIDE 0.25 MG/ML
2 INJECTION INTRAMUSCULAR; INTRAVENOUS ONCE
Refills: 0 | Status: COMPLETED | OUTPATIENT
Start: 2021-12-30 | End: 2021-12-30

## 2021-12-30 RX ORDER — ERYTHROPOIETIN 10000 [IU]/ML
10000 INJECTION, SOLUTION INTRAVENOUS; SUBCUTANEOUS ONCE
Refills: 0 | Status: COMPLETED | OUTPATIENT
Start: 2021-12-30 | End: 2021-12-30

## 2021-12-30 RX ORDER — SODIUM ZIRCONIUM CYCLOSILICATE 10 G/10G
10 POWDER, FOR SUSPENSION ORAL ONCE
Refills: 0 | Status: COMPLETED | OUTPATIENT
Start: 2021-12-30 | End: 2021-12-30

## 2021-12-30 RX ADMIN — Medication 25 MILLIGRAM(S): at 05:26

## 2021-12-30 RX ADMIN — LIDOCAINE 1 PATCH: 4 CREAM TOPICAL at 05:43

## 2021-12-30 RX ADMIN — HEPARIN SODIUM 5000 UNIT(S): 5000 INJECTION INTRAVENOUS; SUBCUTANEOUS at 05:25

## 2021-12-30 RX ADMIN — ERYTHROPOIETIN 10000 UNIT(S): 10000 INJECTION, SOLUTION INTRAVENOUS; SUBCUTANEOUS at 16:45

## 2021-12-30 RX ADMIN — HEPARIN SODIUM 5000 UNIT(S): 5000 INJECTION INTRAVENOUS; SUBCUTANEOUS at 13:24

## 2021-12-30 RX ADMIN — BUMETANIDE 2 MILLIGRAM(S): 0.25 INJECTION INTRAMUSCULAR; INTRAVENOUS at 21:51

## 2021-12-30 RX ADMIN — SODIUM ZIRCONIUM CYCLOSILICATE 10 GRAM(S): 10 POWDER, FOR SUSPENSION ORAL at 15:28

## 2021-12-30 RX ADMIN — SIMVASTATIN 10 MILLIGRAM(S): 20 TABLET, FILM COATED ORAL at 21:52

## 2021-12-30 RX ADMIN — BUMETANIDE 2 MILLIGRAM(S): 0.25 INJECTION INTRAMUSCULAR; INTRAVENOUS at 06:22

## 2021-12-30 RX ADMIN — LIDOCAINE 1 PATCH: 4 CREAM TOPICAL at 13:12

## 2021-12-30 RX ADMIN — Medication 25 MILLIGRAM(S): at 13:25

## 2021-12-30 RX ADMIN — LIDOCAINE 1 PATCH: 4 CREAM TOPICAL at 21:53

## 2021-12-30 RX ADMIN — HEPARIN SODIUM 5000 UNIT(S): 5000 INJECTION INTRAVENOUS; SUBCUTANEOUS at 21:53

## 2021-12-30 RX ADMIN — Medication 25 MILLIGRAM(S): at 21:52

## 2021-12-30 RX ADMIN — PANTOPRAZOLE SODIUM 40 MILLIGRAM(S): 20 TABLET, DELAYED RELEASE ORAL at 05:26

## 2021-12-30 RX ADMIN — Medication 40 MILLIGRAM(S): at 05:26

## 2021-12-30 RX ADMIN — Medication 325 MILLIGRAM(S): at 13:25

## 2021-12-30 RX ADMIN — BUDESONIDE AND FORMOTEROL FUMARATE DIHYDRATE 2 PUFF(S): 160; 4.5 AEROSOL RESPIRATORY (INHALATION) at 09:19

## 2021-12-30 RX ADMIN — Medication 30 MILLILITER(S): at 15:28

## 2021-12-30 RX ADMIN — BUDESONIDE AND FORMOTEROL FUMARATE DIHYDRATE 2 PUFF(S): 160; 4.5 AEROSOL RESPIRATORY (INHALATION) at 21:51

## 2021-12-30 NOTE — PROGRESS NOTE ADULT - SUBJECTIVE AND OBJECTIVE BOX
SUBJECTIVE / OVERNIGHT EVENTS: pt seen and examined    MEDICATIONS  (STANDING):  budesonide 160 MICROgram(s)/formoterol 4.5 MICROgram(s) Inhaler 2 Puff(s) Inhalation two times a day  buMETAnide 2 milliGRAM(s) Oral daily  ferrous    sulfate 325 milliGRAM(s) Oral daily  heparin   Injectable 5000 Unit(s) SubCutaneous every 8 hours  hydrALAZINE 25 milliGRAM(s) Oral every 8 hours  lidocaine   4% Patch 1 Patch Transdermal every 24 hours  metoprolol succinate ER 25 milliGRAM(s) Oral daily  pantoprazole    Tablet 40 milliGRAM(s) Oral before breakfast  predniSONE   Tablet 20 milliGRAM(s) Oral daily  simvastatin 10 milliGRAM(s) Oral at bedtime    MEDICATIONS  (PRN):  ALBUTerol    90 MICROgram(s) HFA Inhaler 2 Puff(s) Inhalation every 6 hours PRN Shortness of Breath and/or Wheezing  aluminum hydroxide/magnesium hydroxide/simethicone Suspension 30 milliLiter(s) Oral every 4 hours PRN Dyspepsia  oxymetazoline 0.05% Nasal Spray 2 Spray(s) Nasal every 12 hours PRN Epistaxis  sodium chloride 0.65% Nasal 1 Spray(s) Both Nostrils every 8 hours PRN Nasal Congestion    Vital Signs Last 24 Hrs  T(C): 36.7 (21 @ 17:36), Max: 36.9 (21 @ 10:20)  T(F): 98 (21 @ 17:36), Max: 98.4 (-21 @ 10:20)  HR: 75 (21 @ 17:36) (62 - 80)  BP: 150/70 (21 @ 17:36) (124/56 - 150/71)  BP(mean): --  RR: 18 (21 @ 17:36) (18 - 19)  SpO2: 97% (21 @ 17:36) (96% - 100%)            Constitutional: No fever, fatigue  Skin: No rash.  Eyes: No recent vision problems or eye pain.  ENT: No congestion, ear pain, or sore throat.  Cardiovascular: No chest pain or palpation.  Respiratory: No cough, shortness of breath, congestion, or wheezing.  Gastrointestinal: No abdominal pain, nausea, vomiting, or diarrhea.  Genitourinary: No dysuria.  Musculoskeletal: No joint swelling.  Neurologic: No headache.    PHYSICAL EXAM:  GENERAL: NAD  EYES: EOMI, PERRLA  NECK: Supple, No JVD  CHEST/LUNG: dec breath sounds at bases, fine exp wheezing+  HEART:  S1 , S2 +  ABDOMEN: soft , bs+  EXTREMITIES: + edema   NEUROLOGY:alert awake    LABS:      136  |  96<L>  |  66<H>  ----------------------------<  126<H>  5.3   |  32<H>  |  1.80<H>    Ca    10.2      30 Dec 2021 08:02  Phos  3.4       Mg     2.70           Creatinine Trend: 1.80 <--, 1.79 <--, 1.75 <--, 1.98 <--, 2.37 <--, 1.58 <--, 1.43 <--                        8.4    6.56  )-----------( 380      ( 30 Dec 2021 08:02 )             28.5     Urine Studies:  Urinalysis Basic - ( 27 Dec 2021 13:20 )    Color: Yellow / Appearance: Slightly Turbid / S.015 / pH:   Gluc:  / Ketone: Negative  / Bili: Negative / Urobili: <2 mg/dL   Blood:  / Protein: Negative / Nitrite: Negative   Leuk Esterase: Large / RBC: 9 /HPF / WBC 8 /HPF   Sq Epi:  / Non Sq Epi: 10 /HPF / Bacteria: Few      Creatinine, Random Urine: 153 mg/dL ( @ 16:17)

## 2021-12-30 NOTE — PROGRESS NOTE ADULT - ASSESSMENT
71y Female with history of COPD, CHF presents with dizziness. Nephrology consulted for elevated Scr.    1) MISAEL: secondary to diuresis. Scr improved and near baseline. UA active however contaminated and doubt patient with underlying GN. FeUrea low. Bladder scan negative. Avoid nephrotoxins.    2) CKD-3b: Baseline Scr 1.4-1.6. Outpatient CKD work up. Monitor electrolytes.    3) HTN with CKD: BP controlled. Continue with current medications. Monitor BP.    4) LE edema: Improving. Continue with bumex 2 mg PO daily. Elevated serum CO2 due to compensated respiratory acidosis as per blood gas. Prior TTE with grossly normal LVSF. Monitor UO.    5) Anemia: Hb low with iron deficiency s/p venofer. Will give Epo 10K X 1 dose today. Monitor Hb.    6) Hyperkalemia: Mild. Will give lokelma 10 grams PO X 1 dose today. Change diet to low K. Monitor serum K.      [facility] NEPHROLOGY  Rodrigue Amador M.D.  Rob Perez D.O.  Ana Song M.D.  Lucrecia López, ALLISON, ANP-C    Telephone: [phone]  Facsimile: [phone]    [address] [place] [address]

## 2021-12-30 NOTE — PROGRESS NOTE ADULT - ASSESSMENT
71 year old hx of HTN, dCHF, MV and TV replacement, MIGUEL not on CPAP due to unable to afford, afib s/p ablation not on AC due to chronic bleeding, unable to do c-scope or endoscopy due to medical conditions, COPD on 3 L NC, here for dizziness. x1 week, feeling generally weakness .    EKG - NSR RBBB  Tele - episodes of Afib   Echo in 9/21 shows normal LV s/p MVR       1) Acute on chronic diastolic CHF   -bumex 2mg po daily   -echo in 9/21 shows normal LV s/p MVR  - agree with starting bumex   - monitor urine out put, daily weights      2) COPD   - on O2   - started on steroids   - f/u pulm     3) Afib   - not on AC, pt doesnt remember why , as per chart sec to GI bleed  -cont toprol     4) MISAEL  -f/u renal recs    5) DVT prophylaxis   - on sc heparin

## 2021-12-30 NOTE — PROGRESS NOTE ADULT - SUBJECTIVE AND OBJECTIVE BOX
Jonas Frias MD  Interventional Cardiology / Endovascular Specialist  [place] Office : [address] [place] [address] [place] [address]  Tel:   [place] Office : [address] [facility] [place] [address]  Tel: [phone]  Cell : [phone]    Subjective/Overnight events: Patient lying in bed comfortably. No acute distress.   	  MEDICATIONS:  buMETAnide 2 milliGRAM(s) Oral daily  heparin   Injectable 5000 Unit(s) SubCutaneous every 8 hours  hydrALAZINE 25 milliGRAM(s) Oral every 8 hours  metoprolol succinate ER 25 milliGRAM(s) Oral daily      ALBUTerol    90 MICROgram(s) HFA Inhaler 2 Puff(s) Inhalation every 6 hours PRN  budesonide 160 MICROgram(s)/formoterol 4.5 MICROgram(s) Inhaler 2 Puff(s) Inhalation two times a day      aluminum hydroxide/magnesium hydroxide/simethicone Suspension 30 milliLiter(s) Oral every 4 hours PRN  pantoprazole    Tablet 40 milliGRAM(s) Oral before breakfast    predniSONE   Tablet 40 milliGRAM(s) Oral daily  simvastatin 10 milliGRAM(s) Oral at bedtime    epoetin livia-epbx (RETACRIT) Injectable 49569 Unit(s) SubCutaneous once  ferrous    sulfate 325 milliGRAM(s) Oral daily  lidocaine   4% Patch 1 Patch Transdermal every 24 hours  oxymetazoline 0.05% Nasal Spray 2 Spray(s) Nasal every 12 hours PRN  sodium chloride 0.65% Nasal 1 Spray(s) Both Nostrils every 8 hours PRN      PAST MEDICAL/SURGICAL HISTORY  PAST MEDICAL & SURGICAL HISTORY:  Atrial fibrillation  S/P Ablation    Pulmonary hypertension    MIGUEL (obstructive sleep apnea)    COPD (chronic obstructive pulmonary disease)  on home O2    CHF (congestive heart failure)    HTN (hypertension)    Gout    Mitral valve replaced    Tricuspid valve replaced    CHF (congestive heart failure)    Hypertension    COPD (chronic obstructive pulmonary disease)    History of mitral valve replacement with mechanical valve    Tricuspid valve replaced  mechanical    No significant past surgical history        SOCIAL HISTORY: Substance Use (street drugs): ( x ) never used  (  ) other:    FAMILY HISTORY:  Family history of hypertension (Father, Sibling)    Family history of stroke    Family history of hypertension (Mother)        REVIEW OF SYSTEMS:  CONSTITUTIONAL: No fever, weight loss, or fatigue  EYES: No eye pain, visual disturbances, or discharge  ENMT:  No difficulty hearing, tinnitus, vertigo; No sinus or throat pain  BREASTS: No pain, masses, or nipple discharge  GASTROINTESTINAL: No abdominal or epigastric pain. No nausea, vomiting, or hematemesis; No diarrhea or constipation. No melena or hematochezia.  GENITOURINARY: No dysuria, frequency, hematuria, or incontinence  NEUROLOGICAL: No headaches, memory loss, loss of strength, numbness, or tremors  ENDOCRINE: No heat or cold intolerance; No hair loss  MUSCULOSKELETAL: No joint pain or swelling; No muscle, back, or extremity pain  PSYCHIATRIC: No depression, anxiety, mood swings, or difficulty sleeping  HEME/LYMPH: No easy bruising, or bleeding gums  All others negative    PHYSICAL EXAM:  T(C): 36.9 (12-30-21 @ 10:20), Max: 37 (12-29-21 @ 22:00)  HR: 62 (12-30-21 @ 10:20) (62 - 86)  BP: 124/56 (12-30-21 @ 10:20) (124/56 - 156/62)  RR: 18 (12-30-21 @ 10:20) (18 - 19)  SpO2: 96% (12-30-21 @ 10:20) (95% - 100%)  Wt(kg): --  I&O's Summary    29 Dec 2021 07:01  -  30 Dec 2021 07:00  --------------------------------------------------------  IN: 300 mL / OUT: 400 mL / NET: -100 mL    EYES:   PERRLA   ENMT:   Moist mucous membranes, Good dentition, No lesions  Cardiovascular: Normal S1 S2, No JVD, No murmurs, No edema  Respiratory: b/l rhonchi and wheezing  Gastrointestinal:  Soft, Non-tender, + BS	  Extremities: 1+ edema                                  8.4    6.56  )-----------( 380      ( 30 Dec 2021 08:02 )             28.5     12-30    136  |  96<L>  |  66<H>  ----------------------------<  126<H>  5.3   |  32<H>  |  1.80<H>    Ca    10.2      30 Dec 2021 08:02  Phos  3.4     12-30  Mg     2.70     12-30      proBNP:   Lipid Profile:   HgA1c:   TSH:     Consultant(s) Notes Reviewed:  [x ] YES  [ ] NO    Care Discussed with Consultants/Other Providers [ x] YES  [ ] NO    Imaging Personally Reviewed independently:  [x] YES  [ ] NO    All labs, radiologic studies, vitals, orders and medications list reviewed. Patient is seen and examined at bedside. Case discussed with medical team.

## 2021-12-30 NOTE — PROGRESS NOTE ADULT - SUBJECTIVE AND OBJECTIVE BOX
PULMONARY PROGRESS NOTE    DAMARI GUERRERO  MRN-[id number]    Patient is a 71y old  Female who presents with a chief complaint of dizziness and sOB (30 Dec 2021 13:34)      HPI:  -Complaining of increased SOB and subjective wheeze  refused BiPAP due to claustrophobia and inability to get up at night to urinate while using    -No pain-  -    ROS:   -SOB, wheeze  -    ACTIVE MEDICATION LIST:  MEDICATIONS  (STANDING):  budesonide 160 MICROgram(s)/formoterol 4.5 MICROgram(s) Inhaler 2 Puff(s) Inhalation two times a day  buMETAnide 2 milliGRAM(s) Oral daily  buMETAnide 2 milliGRAM(s) Oral once  ferrous    sulfate 325 milliGRAM(s) Oral daily  heparin   Injectable 5000 Unit(s) SubCutaneous every 8 hours  hydrALAZINE 25 milliGRAM(s) Oral every 8 hours  lidocaine   4% Patch 1 Patch Transdermal every 24 hours  metoprolol succinate ER 25 milliGRAM(s) Oral daily  pantoprazole    Tablet 40 milliGRAM(s) Oral before breakfast  predniSONE   Tablet 40 milliGRAM(s) Oral daily  simvastatin 10 milliGRAM(s) Oral at bedtime    MEDICATIONS  (PRN):  ALBUTerol    90 MICROgram(s) HFA Inhaler 2 Puff(s) Inhalation every 6 hours PRN Shortness of Breath and/or Wheezing  aluminum hydroxide/magnesium hydroxide/simethicone Suspension 30 milliLiter(s) Oral every 4 hours PRN Dyspepsia  oxymetazoline 0.05% Nasal Spray 2 Spray(s) Nasal every 12 hours PRN Epistaxis  sodium chloride 0.65% Nasal 1 Spray(s) Both Nostrils every 8 hours PRN Nasal Congestion      EXAM:  Vital Signs Last 24 Hrs  T(C): 36.7 (30 Dec 2021 17:36), Max: 37 (29 Dec 2021 22:00)  T(F): 98 (30 Dec 2021 17:36), Max: 98.6 (29 Dec 2021 22:00)  HR: 75 (30 Dec 2021 17:36) (62 - 86)  BP: 150/70 (30 Dec 2021 17:36) (124/56 - 150/71)  BP(mean): --  RR: 18 (30 Dec 2021 17:36) (18 - 19)  SpO2: 97% (30 Dec 2021 17:36) (95% - 100%)    GENERAL: The patient is awake and alert in no apparent distress.     SKIN: Warm, dry, no rashes    LUNGS: Clear to auscultation without wheezing, rales or rhonchi; respirations unlabored    HEART: Regular rate and rhythm without murmur.    ABDOMEN: +BS, Soft, Nontender    EXTREMITIES: No clubbing, cyanosis, edema                              8.4    6.56  )-----------( 380      ( 30 Dec 2021 08:02 )             28.5       12-30    136  |  96<L>  |  66<H>  ----------------------------<  126<H>  5.3   |  32<H>  |  1.80<H>    Ca    10.2      30 Dec 2021 08:02  Phos  3.4     12-30  Mg     2.70     12-30          CXR- increased pulm vasc congestion          PROBLEM LIST:  71y Female with HEALTH ISSUES - PROBLEM Dx:  Chronic obstructive pulmonary disease, unspecified COPD type    Hypertension    CAD (coronary artery disease)    Prophylactic measure    Edema    High creatinine    Acute on chronic diastolic congestive heart failure      RECS:  CXR consistent with fluid overload.  Wheezing likely reflects this  Already has evidence of metabolic alkalosis and needs more diuretics. Steroids may exacerbate the metabolic alkalosis and will reduce dose  Give 1 extra dose of bumex tonight.  Likely component of OHS- noted c02 retention on multiple earlier ABG's-refused Bilevel        Jaleel Kevin MD  [phone]

## 2021-12-30 NOTE — PROGRESS NOTE ADULT - SUBJECTIVE AND OBJECTIVE BOX
[facility] NEPHROLOGY- PROGRESS NOTE    71y Female with history of COPD, CHF presents with dizziness. Nephrology consulted for elevated Scr.    REVIEW OF SYSTEMS:  Gen: no changes in weight  Cards: no chest pain  Resp: + dyspnea with exertion improving  GI: no nausea or vomiting or diarrhea  Vascular: + LE edema improving    No Known Allergies      Hospital Medications: Medications reviewed      VITALS:  T(F): 98.4 (21 @ 10:20), Max: 98.6 (21 @ 22:00)  HR: 62 (21 @ 10:20)  BP: 124/56 (21 @ 10:20)  RR: 18 (21 @ 10:20)  SpO2: 96% (21 @ 10:20)  Wt(kg): --     @ 07:01  -   @ 07:00  --------------------------------------------------------  IN: 300 mL / OUT: 400 mL / NET: -100 mL        PHYSICAL EXAM:    Gen: NAD, calm  Cards: RRR, +S1/S2, no M/G/R  Resp: improved bilateral airway entry, no wheezing  GI: soft, NT/ND, NABS  Vascular: trace LE edema B/L          LABS:      136  |  96<L>  |  66<H>  ----------------------------<  126<H>  5.3   |  32<H>  |  1.80<H>    Ca    10.2      30 Dec 2021 08:02  Phos  3.4       Mg     2.70           Creatinine Trend: 1.80 <--, 1.79 <--, 1.75 <--, 1.98 <--, 2.37 <--, 1.58 <--, 1.43 <--                        8.4    6.56  )-----------( 380      ( 30 Dec 2021 08:02 )             28.5     Urine Studies:  Urinalysis Basic - ( 27 Dec 2021 13:20 )    Color: Yellow / Appearance: Slightly Turbid / S.015 / pH:   Gluc:  / Ketone: Negative  / Bili: Negative / Urobili: <2 mg/dL   Blood:  / Protein: Negative / Nitrite: Negative   Leuk Esterase: Large / RBC: 9 /HPF / WBC 8 /HPF   Sq Epi:  / Non Sq Epi: 10 /HPF / Bacteria: Few      Creatinine, Random Urine: 153 mg/dL (12-26 @ 16:17)

## 2021-12-31 LAB
ANION GAP SERPL CALC-SCNC: 15 MMOL/L — HIGH (ref 7–14)
BUN SERPL-MCNC: 76 MG/DL — HIGH (ref 7–23)
CALCIUM SERPL-MCNC: 10.3 MG/DL — SIGNIFICANT CHANGE UP (ref 8.4–10.5)
CHLORIDE SERPL-SCNC: 94 MMOL/L — LOW (ref 98–107)
CO2 SERPL-SCNC: 28 MMOL/L — SIGNIFICANT CHANGE UP (ref 22–31)
CREAT SERPL-MCNC: 1.86 MG/DL — HIGH (ref 0.5–1.3)
GLUCOSE SERPL-MCNC: 92 MG/DL — SIGNIFICANT CHANGE UP (ref 70–99)
HCT VFR BLD CALC: 31.6 % — LOW (ref 34.5–45)
HGB BLD-MCNC: 9 G/DL — LOW (ref 11.5–15.5)
MAGNESIUM SERPL-MCNC: 2.4 MG/DL — SIGNIFICANT CHANGE UP (ref 1.6–2.6)
MCHC RBC-ENTMCNC: 27.4 PG — SIGNIFICANT CHANGE UP (ref 27–34)
MCHC RBC-ENTMCNC: 28.5 GM/DL — LOW (ref 32–36)
MCV RBC AUTO: 96.3 FL — SIGNIFICANT CHANGE UP (ref 80–100)
NRBC # BLD: 0 /100 WBCS — SIGNIFICANT CHANGE UP
NRBC # FLD: 0.03 K/UL — HIGH
PHOSPHATE SERPL-MCNC: 2.8 MG/DL — SIGNIFICANT CHANGE UP (ref 2.5–4.5)
PLATELET # BLD AUTO: 383 K/UL — SIGNIFICANT CHANGE UP (ref 150–400)
POTASSIUM SERPL-MCNC: 4.4 MMOL/L — SIGNIFICANT CHANGE UP (ref 3.5–5.3)
POTASSIUM SERPL-SCNC: 4.4 MMOL/L — SIGNIFICANT CHANGE UP (ref 3.5–5.3)
RBC # BLD: 3.28 M/UL — LOW (ref 3.8–5.2)
RBC # FLD: 16.9 % — HIGH (ref 10.3–14.5)
SODIUM SERPL-SCNC: 137 MMOL/L — SIGNIFICANT CHANGE UP (ref 135–145)
WBC # BLD: 8.76 K/UL — SIGNIFICANT CHANGE UP (ref 3.8–10.5)
WBC # FLD AUTO: 8.76 K/UL — SIGNIFICANT CHANGE UP (ref 3.8–10.5)

## 2021-12-31 PROCEDURE — 99233 SBSQ HOSP IP/OBS HIGH 50: CPT

## 2021-12-31 RX ADMIN — HEPARIN SODIUM 5000 UNIT(S): 5000 INJECTION INTRAVENOUS; SUBCUTANEOUS at 06:24

## 2021-12-31 RX ADMIN — BUDESONIDE AND FORMOTEROL FUMARATE DIHYDRATE 2 PUFF(S): 160; 4.5 AEROSOL RESPIRATORY (INHALATION) at 22:22

## 2021-12-31 RX ADMIN — Medication 325 MILLIGRAM(S): at 13:01

## 2021-12-31 RX ADMIN — PANTOPRAZOLE SODIUM 40 MILLIGRAM(S): 20 TABLET, DELAYED RELEASE ORAL at 06:23

## 2021-12-31 RX ADMIN — Medication 25 MILLIGRAM(S): at 22:24

## 2021-12-31 RX ADMIN — Medication 25 MILLIGRAM(S): at 06:23

## 2021-12-31 RX ADMIN — BUMETANIDE 2 MILLIGRAM(S): 0.25 INJECTION INTRAMUSCULAR; INTRAVENOUS at 06:24

## 2021-12-31 RX ADMIN — BUDESONIDE AND FORMOTEROL FUMARATE DIHYDRATE 2 PUFF(S): 160; 4.5 AEROSOL RESPIRATORY (INHALATION) at 08:46

## 2021-12-31 RX ADMIN — LIDOCAINE 1 PATCH: 4 CREAM TOPICAL at 09:06

## 2021-12-31 RX ADMIN — Medication 25 MILLIGRAM(S): at 13:01

## 2021-12-31 RX ADMIN — SIMVASTATIN 10 MILLIGRAM(S): 20 TABLET, FILM COATED ORAL at 22:24

## 2021-12-31 RX ADMIN — Medication 25 MILLIGRAM(S): at 06:24

## 2021-12-31 RX ADMIN — LIDOCAINE 1 PATCH: 4 CREAM TOPICAL at 08:49

## 2021-12-31 RX ADMIN — HEPARIN SODIUM 5000 UNIT(S): 5000 INJECTION INTRAVENOUS; SUBCUTANEOUS at 13:02

## 2021-12-31 RX ADMIN — LIDOCAINE 1 PATCH: 4 CREAM TOPICAL at 22:24

## 2021-12-31 RX ADMIN — Medication 20 MILLIGRAM(S): at 06:23

## 2021-12-31 RX ADMIN — HEPARIN SODIUM 5000 UNIT(S): 5000 INJECTION INTRAVENOUS; SUBCUTANEOUS at 22:24

## 2021-12-31 NOTE — PROGRESS NOTE ADULT - SUBJECTIVE AND OBJECTIVE BOX
Jonas Frias MD  Interventional Cardiology / Endovascular Specialist  [place] Office : [address] [place] [address] [place] [address]  Tel:   [place] Office : [address] [facility] [place] [address]  Tel: [phone]  Cell : [phone]    Subjective/Overnight events: Patient lying in bed comfortably. No acute distress.   	  MEDICATIONS:  buMETAnide 2 milliGRAM(s) Oral daily  heparin   Injectable 5000 Unit(s) SubCutaneous every 8 hours  hydrALAZINE 25 milliGRAM(s) Oral every 8 hours  metoprolol succinate ER 25 milliGRAM(s) Oral daily      ALBUTerol    90 MICROgram(s) HFA Inhaler 2 Puff(s) Inhalation every 6 hours PRN  budesonide 160 MICROgram(s)/formoterol 4.5 MICROgram(s) Inhaler 2 Puff(s) Inhalation two times a day      aluminum hydroxide/magnesium hydroxide/simethicone Suspension 30 milliLiter(s) Oral every 4 hours PRN  pantoprazole    Tablet 40 milliGRAM(s) Oral before breakfast    predniSONE   Tablet 20 milliGRAM(s) Oral daily  simvastatin 10 milliGRAM(s) Oral at bedtime    ferrous    sulfate 325 milliGRAM(s) Oral daily  lidocaine   4% Patch 1 Patch Transdermal every 24 hours  oxymetazoline 0.05% Nasal Spray 2 Spray(s) Nasal every 12 hours PRN  sodium chloride 0.65% Nasal 1 Spray(s) Both Nostrils every 8 hours PRN      PAST MEDICAL/SURGICAL HISTORY  PAST MEDICAL & SURGICAL HISTORY:  Atrial fibrillation  S/P Ablation    Pulmonary hypertension    MIGUEL (obstructive sleep apnea)    COPD (chronic obstructive pulmonary disease)  on home O2    CHF (congestive heart failure)    HTN (hypertension)    Gout    Mitral valve replaced    Tricuspid valve replaced    CHF (congestive heart failure)    Hypertension    COPD (chronic obstructive pulmonary disease)    History of mitral valve replacement with mechanical valve    Tricuspid valve replaced  mechanical    No significant past surgical history        SOCIAL HISTORY: Substance Use (street drugs): ( x ) never used  (  ) other:    FAMILY HISTORY:  Family history of hypertension (Father, Sibling)    Family history of stroke    Family history of hypertension (Mother)        REVIEW OF SYSTEMS:  CONSTITUTIONAL: No fever, weight loss, or fatigue  EYES: No eye pain, visual disturbances, or discharge  ENMT:  No difficulty hearing, tinnitus, vertigo; No sinus or throat pain  BREASTS: No pain, masses, or nipple discharge  GASTROINTESTINAL: No abdominal or epigastric pain. No nausea, vomiting, or hematemesis; No diarrhea or constipation. No melena or hematochezia.  GENITOURINARY: No dysuria, frequency, hematuria, or incontinence  NEUROLOGICAL: No headaches, memory loss, loss of strength, numbness, or tremors  ENDOCRINE: No heat or cold intolerance; No hair loss  MUSCULOSKELETAL: No joint pain or swelling; No muscle, back, or extremity pain  PSYCHIATRIC: No depression, anxiety, mood swings, or difficulty sleeping  HEME/LYMPH: No easy bruising, or bleeding gums  All others negative    PHYSICAL EXAM:  T(C): 36.6 (12-31-21 @ 06:15), Max: 36.8 (12-30-21 @ 13:25)  HR: 78 (12-31-21 @ 12:03) (64 - 78)  BP: 148/71 (12-31-21 @ 06:15) (125/52 - 151/69)  RR: 17 (12-31-21 @ 06:15) (17 - 18)  SpO2: 99% (12-31-21 @ 12:03) (96% - 99%)  Wt(kg): --  I&O's Summary      EYES:   PERRLA   ENMT:   Moist mucous membranes, Good dentition, No lesions  Cardiovascular: Normal S1 S2, No JVD, No murmurs, No edema  Respiratory: b/l rhonchi and wheezing  Gastrointestinal:  Soft, Non-tender, + BS	  Extremities: 1+ edema                            9.0    8.76  )-----------( 383      ( 31 Dec 2021 07:45 )             31.6     12-31    137  |  94<L>  |  76<H>  ----------------------------<  92  4.4   |  28  |  1.86<H>    Ca    10.3      31 Dec 2021 07:45  Phos  2.8     12-31  Mg     2.40     12-31      proBNP:   Lipid Profile:   HgA1c:   TSH:     Consultant(s) Notes Reviewed:  [x ] YES  [ ] NO    Care Discussed with Consultants/Other Providers [ x] YES  [ ] NO    Imaging Personally Reviewed independently:  [x] YES  [ ] NO    All labs, radiologic studies, vitals, orders and medications list reviewed. Patient is seen and examined at bedside. Case discussed with medical team.

## 2021-12-31 NOTE — PROGRESS NOTE ADULT - SUBJECTIVE AND OBJECTIVE BOX
PULMONARY PROGRESS NOTE    DAMARI GUERRERO  MRN-[id number]    Patient is a 71y old  Female who presents with a chief complaint of dizziness and sOB (30 Dec 2021 19:23)      HPI:  -better today  some sob but improved after extra dose of furosemide  -  -  -ROS:   -no pain  -    ACTIVE MEDICATION LIST:  MEDICATIONS  (STANDING):  budesonide 160 MICROgram(s)/formoterol 4.5 MICROgram(s) Inhaler 2 Puff(s) Inhalation two times a day  buMETAnide 2 milliGRAM(s) Oral daily  ferrous    sulfate 325 milliGRAM(s) Oral daily  heparin   Injectable 5000 Unit(s) SubCutaneous every 8 hours  hydrALAZINE 25 milliGRAM(s) Oral every 8 hours  lidocaine   4% Patch 1 Patch Transdermal every 24 hours  metoprolol succinate ER 25 milliGRAM(s) Oral daily  pantoprazole    Tablet 40 milliGRAM(s) Oral before breakfast  predniSONE   Tablet 20 milliGRAM(s) Oral daily  simvastatin 10 milliGRAM(s) Oral at bedtime    MEDICATIONS  (PRN):  ALBUTerol    90 MICROgram(s) HFA Inhaler 2 Puff(s) Inhalation every 6 hours PRN Shortness of Breath and/or Wheezing  aluminum hydroxide/magnesium hydroxide/simethicone Suspension 30 milliLiter(s) Oral every 4 hours PRN Dyspepsia  oxymetazoline 0.05% Nasal Spray 2 Spray(s) Nasal every 12 hours PRN Epistaxis  sodium chloride 0.65% Nasal 1 Spray(s) Both Nostrils every 8 hours PRN Nasal Congestion      EXAM:  Vital Signs Last 24 Hrs  T(C): 36.6 (31 Dec 2021 06:15), Max: 36.8 (30 Dec 2021 13:25)  T(F): 97.9 (31 Dec 2021 06:15), Max: 98.2 (30 Dec 2021 13:25)  HR: 78 (31 Dec 2021 06:15) (64 - 78)  BP: 148/71 (31 Dec 2021 06:15) (125/52 - 151/69)  BP(mean): --  RR: 17 (31 Dec 2021 06:15) (17 - 18)  SpO2: 99% (31 Dec 2021 06:15) (96% - 99%)    GENERAL: The patient is awake and alert in no apparent distress.     SKIN: Warm, dry, no rashes    LUNGS: Clear to auscultation without wheezing, rales or rhonchi; noted use of accessory muscles of respiration  HEART: Regular rate and rhythm without murmur.    ABDOMEN: +BS, Soft, Nontender    EXTREMITIES: No clubbing, cyanosis, edema                              9.0    8.76  )-----------( 383      ( 31 Dec 2021 07:45 )             31.6       12-30    136  |  96<L>  |  66<H>  ----------------------------<  126<H>  5.3   |  32<H>  |  1.80<H>    Ca    10.2      30 Dec 2021 08:02  Phos  3.4     12-30  Mg     2.70     12-30                    PROBLEM LIST:  71y Female with HEALTH ISSUES - PROBLEM Dx:  Chronic obstructive pulmonary disease, unspecified COPD type    Hypertension    CAD (coronary artery disease)    Prophylactic measure    Edema    High creatinine    Acute on chronic diastolic congestive heart failure    Acute on chronic resp failure    RECS: Agreed to resume Bilevel at night with nasal mask as trial  Likely candidate for opt NIV if tolerated        Jaleel Kevin MD  [phone]

## 2021-12-31 NOTE — PROGRESS NOTE ADULT - SUBJECTIVE AND OBJECTIVE BOX
[facility] NEPHROLOGY- PROGRESS NOTE    71y Female with history of COPD, CHF presents with dizziness. Nephrology consulted for elevated Scr.    REVIEW OF SYSTEMS:  Gen: no changes in weight  Cards: no chest pain  Resp: + dyspnea improving  GI: no nausea or vomiting or diarrhea  Vascular: + LE edema improving    No Known Allergies      Hospital Medications: Medications reviewed      VITALS:  T(F): 97.9 (21 @ 06:15), Max: 98.2 (21 @ 13:25)  HR: 78 (21 @ 06:15)  BP: 148/71 (21 @ 06:15)  RR: 17 (21 @ 06:15)  SpO2: 99% (21 @ 06:15)  Wt(kg): --      PHYSICAL EXAM:    Gen: NAD, calm  Cards: RRR, +S1/S2, no M/G/R  Resp: improved bilateral airway entry, no wheezing  GI: soft, NT/ND, NABS  Vascular: trace LE edema B/L      LABS:      137  |  94<L>  |  76<H>  ----------------------------<  92  4.4   |  28  |  1.86<H>    Ca    10.3      31 Dec 2021 07:45  Phos  2.8       Mg     2.40           Creatinine Trend: 1.86 <--, 1.80 <--, 1.79 <--, 1.75 <--, 1.98 <--, 2.37 <--, 1.58 <--                        9.0    8.76  )-----------( 383      ( 31 Dec 2021 07:45 )             31.6     Urine Studies:  Urinalysis Basic - ( 27 Dec 2021 13:20 )    Color: Yellow / Appearance: Slightly Turbid / S.015 / pH:   Gluc:  / Ketone: Negative  / Bili: Negative / Urobili: <2 mg/dL   Blood:  / Protein: Negative / Nitrite: Negative   Leuk Esterase: Large / RBC: 9 /HPF / WBC 8 /HPF   Sq Epi:  / Non Sq Epi: 10 /HPF / Bacteria: Few      Creatinine, Random Urine: 153 mg/dL ( @ 16:17)

## 2021-12-31 NOTE — PROGRESS NOTE ADULT - ASSESSMENT
71 year old hx of HTN, dCHF, MV and TV replacement, MIGUEL not on CPAP due to unable to afford, afib s/p ablation not on AC due to chronic bleeding, unable to do c-scope or endoscopy due to medical conditions, COPD on 3 L NC, here for dizziness. x1 week, feeling generally weakness .    EKG - NSR RBBB  Tele - episodes of Afib   Echo in 9/21 shows normal LV s/p MVR       1) Acute on chronic diastolic CHF   - bumex 2mg po daily   - echo in 9/21 shows normal LV s/p MVR  -  c/w bumex  - monitor urine out put, daily weights      2) COPD   - on O2   - started on steroids   - f/u pulm     3) Afib  - not on AC, pt doesn't remember why , as per chart sec to GI bleed  - cont toprol     4) MISAEL  -f/u renal recs    5) DVT prophylaxis   - on sc heparin

## 2021-12-31 NOTE — PROGRESS NOTE ADULT - ASSESSMENT
71y Female with history of COPD, CHF presents with dizziness. Nephrology consulted for elevated Scr.    1) MISAEL: secondary to diuresis. Scr improved and close to baseline. UA active however contaminated and doubt patient with underlying GN. FeUrea low. Bladder scan negative. Avoid nephrotoxins.    2) CKD-3b: Baseline Scr 1.4-1.6. Outpatient CKD work up. Monitor electrolytes.    3) HTN with CKD: BP controlled. Continue with current medications. Monitor BP.    4) LE edema: Improving. Continue with bumex 2 mg PO daily. Elevated serum CO2 due to compensated respiratory acidosis as per blood gas. Prior TTE with grossly normal LVSF. Monitor UO.    5) Anemia: Hb low but improving with iron deficiency s/p venofer. s/p Epo 10K X 1 on 12/30. Monitor Hb.    6) Hyperkalemia: resolved s/p lokelma. c/w low K diet. Monitor serum K.      [facility] NEPHROLOGY  Rodrigue Amador M.D.  Rob Perez D.O.  Ana Song M.D.  Lucrecia López, ALLISON, ANP-C    Telephone: [phone]  Facsimile: [phone]    [address] [place] [address]

## 2021-12-31 NOTE — PROGRESS NOTE ADULT - SUBJECTIVE AND OBJECTIVE BOX
SUBJECTIVE / OVERNIGHT EVENTS: pt seen and examined    MEDICATIONS  (STANDING):  budesonide 160 MICROgram(s)/formoterol 4.5 MICROgram(s) Inhaler 2 Puff(s) Inhalation two times a day  buMETAnide 2 milliGRAM(s) Oral daily  ferrous    sulfate 325 milliGRAM(s) Oral daily  heparin   Injectable 5000 Unit(s) SubCutaneous every 8 hours  hydrALAZINE 25 milliGRAM(s) Oral every 8 hours  lidocaine   4% Patch 1 Patch Transdermal every 24 hours  metoprolol succinate ER 25 milliGRAM(s) Oral daily  pantoprazole    Tablet 40 milliGRAM(s) Oral before breakfast  predniSONE   Tablet 20 milliGRAM(s) Oral daily  simvastatin 10 milliGRAM(s) Oral at bedtime    MEDICATIONS  (PRN):  ALBUTerol    90 MICROgram(s) HFA Inhaler 2 Puff(s) Inhalation every 6 hours PRN Shortness of Breath and/or Wheezing  aluminum hydroxide/magnesium hydroxide/simethicone Suspension 30 milliLiter(s) Oral every 4 hours PRN Dyspepsia  oxymetazoline 0.05% Nasal Spray 2 Spray(s) Nasal every 12 hours PRN Epistaxis  sodium chloride 0.65% Nasal 1 Spray(s) Both Nostrils every 8 hours PRN Nasal Congestion    Vital Signs Last 24 Hrs  T(C): 36.7 (21 @ 12:56), Max: 36.7 (21 @ 21:30)  T(F): 98.1 (21 @ 12:56), Max: 98.1 (21 @ 21:30)  HR: 80 (21 @ 16:12) (73 - 80)  BP: 138/59 (21 @ 12:56) (138/59 - 151/69)  BP(mean): --  RR: 19 (21 @ 12:56) (17 - 19)  SpO2: 97% (21 @ 16:12) (97% - 99%)          Constitutional: No fever, fatigue  Skin: No rash.  Eyes: No recent vision problems or eye pain.  ENT: No congestion, ear pain, or sore throat.  Cardiovascular: No chest pain or palpation.  Respiratory: No cough, shortness of breath, congestion, or wheezing.  Gastrointestinal: No abdominal pain, nausea, vomiting, or diarrhea.  Genitourinary: No dysuria.  Musculoskeletal: No joint swelling.  Neurologic: No headache.    PHYSICAL EXAM:  GENERAL: NAD  EYES: EOMI, PERRLA  NECK: Supple, No JVD  CHEST/LUNG: dec breath sounds at bases, fine exp wheezing+  HEART:  S1 , S2 +  ABDOMEN: soft , bs+  EXTREMITIES: + edema   NEUROLOGY:alert awake    LABS:      137  |  94<L>  |  76<H>  ----------------------------<  92  4.4   |  28  |  1.86<H>    Ca    10.3      31 Dec 2021 07:45  Phos  2.8       Mg     2.40           Creatinine Trend: 1.86 <--, 1.80 <--, 1.79 <--, 1.75 <--, 1.98 <--, 2.37 <--, 1.58 <--                        9.0    8.76  )-----------( 383      ( 31 Dec 2021 07:45 )             31.6     Urine Studies:  Urinalysis Basic - ( 27 Dec 2021 13:20 )    Color: Yellow / Appearance: Slightly Turbid / S.015 / pH:   Gluc:  / Ketone: Negative  / Bili: Negative / Urobili: <2 mg/dL   Blood:  / Protein: Negative / Nitrite: Negative   Leuk Esterase: Large / RBC: 9 /HPF / WBC 8 /HPF   Sq Epi:  / Non Sq Epi: 10 /HPF / Bacteria: Few      Creatinine, Random Urine: 153 mg/dL ( @ 16:17)

## 2022-01-01 LAB
ANION GAP SERPL CALC-SCNC: 13 MMOL/L — SIGNIFICANT CHANGE UP (ref 7–14)
BUN SERPL-MCNC: 79 MG/DL — HIGH (ref 7–23)
CALCIUM SERPL-MCNC: 10.1 MG/DL — SIGNIFICANT CHANGE UP (ref 8.4–10.5)
CHLORIDE SERPL-SCNC: 95 MMOL/L — LOW (ref 98–107)
CO2 SERPL-SCNC: 29 MMOL/L — SIGNIFICANT CHANGE UP (ref 22–31)
CREAT SERPL-MCNC: 1.83 MG/DL — HIGH (ref 0.5–1.3)
GLUCOSE SERPL-MCNC: 78 MG/DL — SIGNIFICANT CHANGE UP (ref 70–99)
HCT VFR BLD CALC: 30.3 % — LOW (ref 34.5–45)
HGB BLD-MCNC: 8.6 G/DL — LOW (ref 11.5–15.5)
MAGNESIUM SERPL-MCNC: 2.4 MG/DL — SIGNIFICANT CHANGE UP (ref 1.6–2.6)
MCHC RBC-ENTMCNC: 26.9 PG — LOW (ref 27–34)
MCHC RBC-ENTMCNC: 28.4 GM/DL — LOW (ref 32–36)
MCV RBC AUTO: 94.7 FL — SIGNIFICANT CHANGE UP (ref 80–100)
NRBC # BLD: 0 /100 WBCS — SIGNIFICANT CHANGE UP
NRBC # FLD: 0.07 K/UL — HIGH
PHOSPHATE SERPL-MCNC: 2.9 MG/DL — SIGNIFICANT CHANGE UP (ref 2.5–4.5)
PLATELET # BLD AUTO: 371 K/UL — SIGNIFICANT CHANGE UP (ref 150–400)
POTASSIUM SERPL-MCNC: 4.6 MMOL/L — SIGNIFICANT CHANGE UP (ref 3.5–5.3)
POTASSIUM SERPL-SCNC: 4.6 MMOL/L — SIGNIFICANT CHANGE UP (ref 3.5–5.3)
RBC # BLD: 3.2 M/UL — LOW (ref 3.8–5.2)
RBC # FLD: 17.2 % — HIGH (ref 10.3–14.5)
SODIUM SERPL-SCNC: 137 MMOL/L — SIGNIFICANT CHANGE UP (ref 135–145)
WBC # BLD: 9.25 K/UL — SIGNIFICANT CHANGE UP (ref 3.8–10.5)
WBC # FLD AUTO: 9.25 K/UL — SIGNIFICANT CHANGE UP (ref 3.8–10.5)

## 2022-01-01 PROCEDURE — 99233 SBSQ HOSP IP/OBS HIGH 50: CPT

## 2022-01-01 RX ADMIN — Medication 20 MILLIGRAM(S): at 06:17

## 2022-01-01 RX ADMIN — Medication 25 MILLIGRAM(S): at 13:13

## 2022-01-01 RX ADMIN — Medication 25 MILLIGRAM(S): at 06:16

## 2022-01-01 RX ADMIN — PANTOPRAZOLE SODIUM 40 MILLIGRAM(S): 20 TABLET, DELAYED RELEASE ORAL at 06:16

## 2022-01-01 RX ADMIN — BUMETANIDE 2 MILLIGRAM(S): 0.25 INJECTION INTRAMUSCULAR; INTRAVENOUS at 06:16

## 2022-01-01 RX ADMIN — HEPARIN SODIUM 5000 UNIT(S): 5000 INJECTION INTRAVENOUS; SUBCUTANEOUS at 06:17

## 2022-01-01 RX ADMIN — SIMVASTATIN 10 MILLIGRAM(S): 20 TABLET, FILM COATED ORAL at 22:04

## 2022-01-01 RX ADMIN — LIDOCAINE 1 PATCH: 4 CREAM TOPICAL at 08:23

## 2022-01-01 RX ADMIN — BUDESONIDE AND FORMOTEROL FUMARATE DIHYDRATE 2 PUFF(S): 160; 4.5 AEROSOL RESPIRATORY (INHALATION) at 08:23

## 2022-01-01 RX ADMIN — HEPARIN SODIUM 5000 UNIT(S): 5000 INJECTION INTRAVENOUS; SUBCUTANEOUS at 22:06

## 2022-01-01 RX ADMIN — Medication 25 MILLIGRAM(S): at 22:04

## 2022-01-01 RX ADMIN — LIDOCAINE 1 PATCH: 4 CREAM TOPICAL at 22:05

## 2022-01-01 RX ADMIN — Medication 325 MILLIGRAM(S): at 13:13

## 2022-01-01 RX ADMIN — LIDOCAINE 1 PATCH: 4 CREAM TOPICAL at 10:27

## 2022-01-01 RX ADMIN — BUDESONIDE AND FORMOTEROL FUMARATE DIHYDRATE 2 PUFF(S): 160; 4.5 AEROSOL RESPIRATORY (INHALATION) at 22:03

## 2022-01-01 NOTE — PROGRESS NOTE ADULT - SUBJECTIVE AND OBJECTIVE BOX
SUBJECTIVE / OVERNIGHT EVENTS: pt seen and examined    MEDICATIONS  (STANDING):  budesonide 160 MICROgram(s)/formoterol 4.5 MICROgram(s) Inhaler 2 Puff(s) Inhalation two times a day  buMETAnide 2 milliGRAM(s) Oral daily  ferrous    sulfate 325 milliGRAM(s) Oral daily  heparin   Injectable 5000 Unit(s) SubCutaneous every 8 hours  hydrALAZINE 25 milliGRAM(s) Oral every 8 hours  lidocaine   4% Patch 1 Patch Transdermal every 24 hours  metoprolol succinate ER 25 milliGRAM(s) Oral daily  pantoprazole    Tablet 40 milliGRAM(s) Oral before breakfast  predniSONE   Tablet 20 milliGRAM(s) Oral daily  simvastatin 10 milliGRAM(s) Oral at bedtime    MEDICATIONS  (PRN):  ALBUTerol    90 MICROgram(s) HFA Inhaler 2 Puff(s) Inhalation every 6 hours PRN Shortness of Breath and/or Wheezing  aluminum hydroxide/magnesium hydroxide/simethicone Suspension 30 milliLiter(s) Oral every 4 hours PRN Dyspepsia  oxymetazoline 0.05% Nasal Spray 2 Spray(s) Nasal every 12 hours PRN Epistaxis  sodium chloride 0.65% Nasal 1 Spray(s) Both Nostrils every 8 hours PRN Nasal Congestion    Vital Signs Last 24 Hrs  T(C): 36.6 (22 @ 11:30), Max: 36.6 (22 @ 06:00)  T(F): 97.8 (22 @ 11:30), Max: 97.9 (22 @ 06:00)  HR: 72 (22 @ 11:30) (72 - 87)  BP: 114/88 (22 @ 11:30) (109/69 - 114/88)  BP(mean): --  RR: 18 (22 @ 11:30) (18 - 18)  SpO2: 99% (22 @ 11:30) (95% - 100%)                                                                 Constitutional: No fever, fatigue  Skin: No rash.  Eyes: No recent vision problems or eye pain.  ENT: No congestion, ear pain, or sore throat.  Cardiovascular: No chest pain or palpation.  Respiratory: No cough, shortness of breath, congestion, or wheezing.  Gastrointestinal: No abdominal pain, nausea, vomiting, or diarrhea.  Genitourinary: No dysuria.  Musculoskeletal: No joint swelling.  Neurologic: No headache.    PHYSICAL EXAM:  GENERAL: NAD  EYES: EOMI, PERRLA  NECK: Supple, No JVD  CHEST/LUNG: dec breath sounds at bases, fine exp wheezing+  HEART:  S1 , S2 +  ABDOMEN: soft , bs+  EXTREMITIES: + edema   NEUROLOGY:alert awake    LABS:      137  |  95<L>  |  79<H>  ----------------------------<  78  4.6   |  29  |  1.83<H>    Ca    10.1      2022 08:09  Phos  2.9       Mg     2.40           Creatinine Trend: 1.83 <--, 1.86 <--, 1.80 <--, 1.79 <--, 1.75 <--, 1.98 <--, 2.37 <--                        8.6    9.25  )-----------( 371      ( 2022 08:09 )             30.3     Urine Studies:  Urinalysis Basic - ( 27 Dec 2021 13:20 )    Color: Yellow / Appearance: Slightly Turbid / S.015 / pH:   Gluc:  / Ketone: Negative  / Bili: Negative / Urobili: <2 mg/dL   Blood:  / Protein: Negative / Nitrite: Negative   Leuk Esterase: Large / RBC: 9 /HPF / WBC 8 /HPF   Sq Epi:  / Non Sq Epi: 10 /HPF / Bacteria: Few      Creatinine, Random Urine: 153 mg/dL ( @ 16:17)

## 2022-01-01 NOTE — PROGRESS NOTE ADULT - ASSESSMENT
71y Female with history of COPD, CHF presents with dizziness. Nephrology consulted for elevated Scr.    1) MISAEL: secondary to diuresis. Scr improved and close to baseline. UA active however contaminated and doubt patient with underlying GN. FeUrea low. Bladder scan negative. Avoid nephrotoxins.    2) CKD-3b: Baseline Scr 1.4-1.6. Outpatient CKD work up. Monitor electrolytes.    3) HTN with CKD: BP controlled. Continue with current medications. Monitor BP.    4) LE edema: Improving. Continue with bumex 2 mg PO daily. Elevated serum CO2 due to compensated respiratory acidosis as per blood gas. Prior TTE with grossly normal LVSF. Monitor UO.    5) Anemia: Hb low but improving with iron deficiency s/p venofer. s/p Epo 10K X 1 on 12/30. Monitor Hb.    6) Hyperkalemia: resolved s/p lokelma. c/w low K diet. Monitor serum K.      [facility] NEPHROLOGY  Rodrigue Amador M.D.  Rob Perez D.O.  Ana Song M.D.  Lucrecia López, ALLISON, ANP-C    Telephone: [phone]  Facsimile: [phone]    [address] [place] [address]

## 2022-01-01 NOTE — PROGRESS NOTE ADULT - SUBJECTIVE AND OBJECTIVE BOX
PULMONARY PROGRESS NOTE    DAMARI GUERRERO  MRN-[id number]    Patient is a 71y old  Female who presents with a chief complaint of dizziness and sOB (30 Dec 2021 19:23)      HPI:  slept with nasal bipap last night  -feels so much better today  -  -  -ROS:   -no pain  -    MEDICATIONS  (STANDING):  budesonide 160 MICROgram(s)/formoterol 4.5 MICROgram(s) Inhaler 2 Puff(s) Inhalation two times a day  buMETAnide 2 milliGRAM(s) Oral daily  ferrous    sulfate 325 milliGRAM(s) Oral daily  heparin   Injectable 5000 Unit(s) SubCutaneous every 8 hours  hydrALAZINE 25 milliGRAM(s) Oral every 8 hours  lidocaine   4% Patch 1 Patch Transdermal every 24 hours  metoprolol succinate ER 25 milliGRAM(s) Oral daily  pantoprazole    Tablet 40 milliGRAM(s) Oral before breakfast  predniSONE   Tablet 20 milliGRAM(s) Oral daily  simvastatin 10 milliGRAM(s) Oral at bedtime    MEDICATIONS  (PRN):  ALBUTerol    90 MICROgram(s) HFA Inhaler 2 Puff(s) Inhalation every 6 hours PRN Shortness of Breath and/or Wheezing  aluminum hydroxide/magnesium hydroxide/simethicone Suspension 30 milliLiter(s) Oral every 4 hours PRN Dyspepsia  oxymetazoline 0.05% Nasal Spray 2 Spray(s) Nasal every 12 hours PRN Epistaxis  sodium chloride 0.65% Nasal 1 Spray(s) Both Nostrils every 8 hours PRN Nasal Congestion      EXAM:  Vital Signs Last 24 Hrs  T(C): 36.6 (01 Jan 2022 06:00), Max: 36.7 (31 Dec 2021 12:56)  T(F): 97.9 (01 Jan 2022 06:00), Max: 98.1 (31 Dec 2021 12:56)  HR: 86 (01 Jan 2022 10:58) (73 - 87)  BP: 109/69 (01 Jan 2022 06:00) (109/69 - 138/59)  BP(mean): --  RR: 18 (01 Jan 2022 06:00) (18 - 19)  SpO2: 100% (01 Jan 2022 10:58) (95% - 100%)    GENERAL: The patient is awake and alert in no apparent distress.     SKIN: Warm, dry, no rashes    LUNGS: Clear to auscultation without wheezing, rales or rhonchi; noted use of accessory muscles of respiration  HEART: Regular rate and rhythm without murmur.                                8.6    9.25  )-----------( 371      ( 01 Jan 2022 08:09 )             30.3   01-01    137  |  95<L>  |  79<H>  ----------------------------<  78  4.6   |  29  |  1.83<H>    Ca    10.1      01 Jan 2022 08:09  Phos  2.9     01-01  Mg     2.40     01-01                      PROBLEM LIST:  71y Female with HEALTH ISSUES - PROBLEM Dx:  Chronic obstructive pulmonary disease, unspecified COPD type    Hypertension    CAD (coronary artery disease)    Prophylactic measure    Edema    High creatinine    Acute on chronic diastolic congestive heart failure    Acute on chronic resp failure    RECS:  -clinically much improved with nasal bipap qhs  -repeat blood gas today  -would be a good candidate for NIV at home  -cont cards phan Merida MD  [phone]

## 2022-01-01 NOTE — PROGRESS NOTE ADULT - SUBJECTIVE AND OBJECTIVE BOX
Jonas Frias MD  Interventional Cardiology / Endovascular Specialist  [place] Office : [address] [place] [address] [place] [address]  Tel:   [place] Office : [address] [facility] [place] [address]  Tel: [phone]  Cell : [phone]    Subjective/Overnight events: Patient lying in bed comfortably. No acute distress.   	  MEDICATIONS:  buMETAnide 2 milliGRAM(s) Oral daily  heparin   Injectable 5000 Unit(s) SubCutaneous every 8 hours  hydrALAZINE 25 milliGRAM(s) Oral every 8 hours  metoprolol succinate ER 25 milliGRAM(s) Oral daily      ALBUTerol    90 MICROgram(s) HFA Inhaler 2 Puff(s) Inhalation every 6 hours PRN  budesonide 160 MICROgram(s)/formoterol 4.5 MICROgram(s) Inhaler 2 Puff(s) Inhalation two times a day      aluminum hydroxide/magnesium hydroxide/simethicone Suspension 30 milliLiter(s) Oral every 4 hours PRN  pantoprazole    Tablet 40 milliGRAM(s) Oral before breakfast    predniSONE   Tablet 20 milliGRAM(s) Oral daily  simvastatin 10 milliGRAM(s) Oral at bedtime    ferrous    sulfate 325 milliGRAM(s) Oral daily  lidocaine   4% Patch 1 Patch Transdermal every 24 hours  oxymetazoline 0.05% Nasal Spray 2 Spray(s) Nasal every 12 hours PRN  sodium chloride 0.65% Nasal 1 Spray(s) Both Nostrils every 8 hours PRN      PAST MEDICAL/SURGICAL HISTORY  PAST MEDICAL & SURGICAL HISTORY:  Atrial fibrillation  S/P Ablation    Pulmonary hypertension    MIGUEL (obstructive sleep apnea)    COPD (chronic obstructive pulmonary disease)  on home O2    CHF (congestive heart failure)    HTN (hypertension)    Gout    Mitral valve replaced    Tricuspid valve replaced    CHF (congestive heart failure)    Hypertension    COPD (chronic obstructive pulmonary disease)    History of mitral valve replacement with mechanical valve    Tricuspid valve replaced  mechanical    No significant past surgical history        SOCIAL HISTORY: Substance Use (street drugs): ( x ) never used  (  ) other:    FAMILY HISTORY:  Family history of hypertension (Father, Sibling)    Family history of stroke    Family history of hypertension (Mother)        REVIEW OF SYSTEMS:  CONSTITUTIONAL: No fever, weight loss, or fatigue  EYES: No eye pain, visual disturbances, or discharge  ENMT:  No difficulty hearing, tinnitus, vertigo; No sinus or throat pain  BREASTS: No pain, masses, or nipple discharge  GASTROINTESTINAL: No abdominal or epigastric pain. No nausea, vomiting, or hematemesis; No diarrhea or constipation. No melena or hematochezia.  GENITOURINARY: No dysuria, frequency, hematuria, or incontinence  NEUROLOGICAL: No headaches, memory loss, loss of strength, numbness, or tremors  ENDOCRINE: No heat or cold intolerance; No hair loss  MUSCULOSKELETAL: No joint pain or swelling; No muscle, back, or extremity pain  PSYCHIATRIC: No depression, anxiety, mood swings, or difficulty sleeping  HEME/LYMPH: No easy bruising, or bleeding gums  All others negative    PHYSICAL EXAM:  T(C): 36.6 (01-01-22 @ 06:00), Max: 36.7 (12-31-21 @ 12:56)  HR: 86 (01-01-22 @ 10:58) (73 - 87)  BP: 109/69 (01-01-22 @ 06:00) (109/69 - 138/59)  RR: 18 (01-01-22 @ 06:00) (18 - 19)  SpO2: 100% (01-01-22 @ 10:58) (95% - 100%)  Wt(kg): --  I&O's Summary      EYES:   PERRLA   ENMT:   Moist mucous membranes, Good dentition, No lesions  Cardiovascular: Normal S1 S2, No JVD, No murmurs, No edema  Respiratory: b/l rhonchi and wheezing  Gastrointestinal:  Soft, Non-tender, + BS	  Extremities: 1+ edema                          8.6    9.25  )-----------( 371      ( 01 Jan 2022 08:09 )             30.3     01-01    137  |  95<L>  |  79<H>  ----------------------------<  78  4.6   |  29  |  1.83<H>    Ca    10.1      01 Jan 2022 08:09  Phos  2.9     01-01  Mg     2.40     01-01      proBNP:   Lipid Profile:   HgA1c:   TSH:     Consultant(s) Notes Reviewed:  [x ] YES  [ ] NO    Care Discussed with Consultants/Other Providers [ x] YES  [ ] NO    Imaging Personally Reviewed independently:  [x] YES  [ ] NO    All labs, radiologic studies, vitals, orders and medications list reviewed. Patient is seen and examined at bedside. Case discussed with medical team.

## 2022-01-01 NOTE — PROGRESS NOTE ADULT - SUBJECTIVE AND OBJECTIVE BOX
[facility] NEPHROLOGY- PROGRESS NOTE    71y Female with history of COPD, CHF presents with dizziness. Nephrology consulted for elevated Scr.    REVIEW OF SYSTEMS:  Gen: no changes in weight  Cards: no chest pain  Resp: + dyspnea improving +dry cough  GI: no nausea or vomiting or diarrhea  Vascular: + LE edema improving    No Known Allergies      Hospital Medications: Medications reviewed      VITALS:  T(F): 97.9 (22 @ 06:00), Max: 98.1 (21 @ 12:56)  HR: 86 (22 @ 10:58)  BP: 109/69 (22 @ 06:00)  RR: 18 (22 @ 06:00)  SpO2: 100% (22 @ 10:58)  Wt(kg): --        PHYSICAL EXAM:    Gen: NAD, calm  Cards: RRR, +S1/S2, no M/G/R  Resp: improved bilateral airway entry, with mild exp wheezing  GI: soft, NT/ND, NABS  Vascular: No LE edema B/L    LABS:      137  |  95<L>  |  79<H>  ----------------------------<  78  4.6   |  29  |  1.83<H>    Ca    10.1      2022 08:09  Phos  2.9       Mg     2.40           Creatinine Trend: 1.83 <--, 1.86 <--, 1.80 <--, 1.79 <--, 1.75 <--, 1.98 <--, 2.37 <--                        8.6    9.25  )-----------( 371      ( 2022 08:09 )             30.3     Urine Studies:  Urinalysis Basic - ( 27 Dec 2021 13:20 )    Color: Yellow / Appearance: Slightly Turbid / S.015 / pH:   Gluc:  / Ketone: Negative  / Bili: Negative / Urobili: <2 mg/dL   Blood:  / Protein: Negative / Nitrite: Negative   Leuk Esterase: Large / RBC: 9 /HPF / WBC 8 /HPF   Sq Epi:  / Non Sq Epi: 10 /HPF / Bacteria: Few      Creatinine, Random Urine: 153 mg/dL ( @ 16:17)

## 2022-01-01 NOTE — PROGRESS NOTE ADULT - ASSESSMENT
71 year old hx of HTN, dCHF, MV and TV replacement, MIGUEL not on CPAP due to unable to afford, afib s/p ablation not on AC due to chronic bleeding, unable to do c-scope or endoscopy due to medical conditions, COPD on 3 L NC, here for dizziness. x1 week, feeling generally weakness .    EKG - NSR RBBB  Tele - episodes of Afib   Echo in 9/21 shows normal LV s/p MVR       1) Acute on chronic diastolic CHF   - bumex 2mg po daily   - echo in 9/21 shows normal LV s/p MVR  -  c/w bumex  - monitor urine out put, daily weights      2) COPD   - on O2   - c/w  steroids   - f/u pulm     3) Afib  - not on AC, pt doesn't remember why , as per chart sec to GI bleed  - cont toprol     4) MISAEL  -f/u renal recs    5) DVT prophylaxis   - on sc heparin

## 2022-01-02 LAB
B PERT DNA SPEC QL NAA+PROBE: SIGNIFICANT CHANGE UP
B PERT+PARAPERT DNA PNL SPEC NAA+PROBE: SIGNIFICANT CHANGE UP
BASE EXCESS BLDV CALC-SCNC: 8.2 MMOL/L — HIGH (ref -2–3)
BORDETELLA PARAPERTUSSIS (RAPRVP): SIGNIFICANT CHANGE UP
C PNEUM DNA SPEC QL NAA+PROBE: SIGNIFICANT CHANGE UP
CO2 BLDV-SCNC: 39.3 MMOL/L — HIGH (ref 22–26)
FLUAV SUBTYP SPEC NAA+PROBE: SIGNIFICANT CHANGE UP
FLUBV RNA SPEC QL NAA+PROBE: SIGNIFICANT CHANGE UP
HADV DNA SPEC QL NAA+PROBE: SIGNIFICANT CHANGE UP
HCO3 BLDV-SCNC: 37 MMOL/L — HIGH (ref 22–29)
HCOV 229E RNA SPEC QL NAA+PROBE: SIGNIFICANT CHANGE UP
HCOV HKU1 RNA SPEC QL NAA+PROBE: SIGNIFICANT CHANGE UP
HCOV NL63 RNA SPEC QL NAA+PROBE: SIGNIFICANT CHANGE UP
HCOV OC43 RNA SPEC QL NAA+PROBE: SIGNIFICANT CHANGE UP
HMPV RNA SPEC QL NAA+PROBE: DETECTED
HPIV1 RNA SPEC QL NAA+PROBE: SIGNIFICANT CHANGE UP
HPIV2 RNA SPEC QL NAA+PROBE: SIGNIFICANT CHANGE UP
HPIV3 RNA SPEC QL NAA+PROBE: SIGNIFICANT CHANGE UP
HPIV4 RNA SPEC QL NAA+PROBE: SIGNIFICANT CHANGE UP
M PNEUMO DNA SPEC QL NAA+PROBE: SIGNIFICANT CHANGE UP
PCO2 BLDV: 72 MMHG — HIGH (ref 39–42)
PH BLDV: 7.32 — SIGNIFICANT CHANGE UP (ref 7.32–7.43)
PO2 BLDV: 41 MMHG — SIGNIFICANT CHANGE UP
RAPID RVP RESULT: DETECTED
RSV RNA SPEC QL NAA+PROBE: SIGNIFICANT CHANGE UP
RV+EV RNA SPEC QL NAA+PROBE: SIGNIFICANT CHANGE UP
SAO2 % BLDV: 68.8 % — SIGNIFICANT CHANGE UP
SARS-COV-2 RNA SPEC QL NAA+PROBE: SIGNIFICANT CHANGE UP

## 2022-01-02 PROCEDURE — 71045 X-RAY EXAM CHEST 1 VIEW: CPT | Mod: 26

## 2022-01-02 PROCEDURE — 99233 SBSQ HOSP IP/OBS HIGH 50: CPT

## 2022-01-02 RX ORDER — BUMETANIDE 0.25 MG/ML
2 INJECTION INTRAMUSCULAR; INTRAVENOUS ONCE
Refills: 0 | Status: COMPLETED | OUTPATIENT
Start: 2022-01-02 | End: 2022-01-02

## 2022-01-02 RX ORDER — ACETAMINOPHEN 500 MG
650 TABLET ORAL EVERY 6 HOURS
Refills: 0 | Status: DISCONTINUED | OUTPATIENT
Start: 2022-01-02 | End: 2022-01-14

## 2022-01-02 RX ADMIN — Medication 20 MILLIGRAM(S): at 05:57

## 2022-01-02 RX ADMIN — PANTOPRAZOLE SODIUM 40 MILLIGRAM(S): 20 TABLET, DELAYED RELEASE ORAL at 05:55

## 2022-01-02 RX ADMIN — HEPARIN SODIUM 5000 UNIT(S): 5000 INJECTION INTRAVENOUS; SUBCUTANEOUS at 22:44

## 2022-01-02 RX ADMIN — Medication 100 MILLIGRAM(S): at 18:26

## 2022-01-02 RX ADMIN — Medication 25 MILLIGRAM(S): at 22:44

## 2022-01-02 RX ADMIN — Medication 40 MILLIGRAM(S): at 22:50

## 2022-01-02 RX ADMIN — Medication 650 MILLIGRAM(S): at 21:38

## 2022-01-02 RX ADMIN — Medication 325 MILLIGRAM(S): at 11:26

## 2022-01-02 RX ADMIN — HEPARIN SODIUM 5000 UNIT(S): 5000 INJECTION INTRAVENOUS; SUBCUTANEOUS at 05:55

## 2022-01-02 RX ADMIN — BUMETANIDE 2 MILLIGRAM(S): 0.25 INJECTION INTRAMUSCULAR; INTRAVENOUS at 18:16

## 2022-01-02 RX ADMIN — LIDOCAINE 1 PATCH: 4 CREAM TOPICAL at 23:11

## 2022-01-02 RX ADMIN — BUDESONIDE AND FORMOTEROL FUMARATE DIHYDRATE 2 PUFF(S): 160; 4.5 AEROSOL RESPIRATORY (INHALATION) at 09:47

## 2022-01-02 RX ADMIN — BUMETANIDE 2 MILLIGRAM(S): 0.25 INJECTION INTRAMUSCULAR; INTRAVENOUS at 05:55

## 2022-01-02 RX ADMIN — SIMVASTATIN 10 MILLIGRAM(S): 20 TABLET, FILM COATED ORAL at 22:44

## 2022-01-02 RX ADMIN — ALBUTEROL 2 PUFF(S): 90 AEROSOL, METERED ORAL at 10:30

## 2022-01-02 RX ADMIN — Medication 25 MILLIGRAM(S): at 13:25

## 2022-01-02 RX ADMIN — Medication 100 MILLIGRAM(S): at 11:25

## 2022-01-02 RX ADMIN — Medication 650 MILLIGRAM(S): at 20:58

## 2022-01-02 RX ADMIN — Medication 25 MILLIGRAM(S): at 05:57

## 2022-01-02 RX ADMIN — LIDOCAINE 1 PATCH: 4 CREAM TOPICAL at 10:00

## 2022-01-02 RX ADMIN — HEPARIN SODIUM 5000 UNIT(S): 5000 INJECTION INTRAVENOUS; SUBCUTANEOUS at 13:24

## 2022-01-02 RX ADMIN — Medication 25 MILLIGRAM(S): at 05:55

## 2022-01-02 RX ADMIN — BUDESONIDE AND FORMOTEROL FUMARATE DIHYDRATE 2 PUFF(S): 160; 4.5 AEROSOL RESPIRATORY (INHALATION) at 22:50

## 2022-01-02 NOTE — PROGRESS NOTE ADULT - ASSESSMENT
71y Female with history of COPD, CHF presents with dizziness. Nephrology consulted for elevated Scr.    1) MISAEL: secondary to diuresis. Scr improved and just mildly above baseline. UA active however contaminated and doubt patient with underlying GN. FeUrea low. Bladder scan negative. Avoid nephrotoxins.    2) CKD-3b: Baseline Scr 1.4-1.6. Outpatient CKD work up. Monitor electrolytes.    3) HTN with CKD: BP controlled. Continue with current medications. Monitor BP.    4) LE edema: Improving. Continue with bumex 2 mg PO daily. Elevated serum CO2 due to compensated respiratory acidosis as per blood gas. Prior TTE with grossly normal LVSF. Monitor UO.    5) Anemia: Hb low with iron deficiency s/p venofer. s/p Epo 10K X 1 on 12/30. Monitor Hb.    6) Hyperkalemia: resolved s/p lokelma. c/w low K diet. Monitor serum K.      [facility] NEPHROLOGY  Rodrigue Amador M.D.  Rob Perez D.O.  Ana Song M.D.  Lucrecia López, ALLISON, ANP-C    Telephone: [phone]  Facsimile: [phone]    [address] [place] [address]

## 2022-01-02 NOTE — PROGRESS NOTE ADULT - SUBJECTIVE AND OBJECTIVE BOX
PULMONARY PROGRESS NOTE    DAMARI GUERRERO  MRN-[id number]    Patient is a 71y old  Female who presents with a chief complaint of dizziness and sOB (02 Jan 2022 10:47)      HPI: More cough this am  unable to lie flat  increased wheezing-  -tolerated NIV for a few hours today  -  -    ROS: increased cough noted  -  -    ACTIVE MEDICATION LIST:  MEDICATIONS  (STANDING):  benzonatate 100 milliGRAM(s) Oral every 8 hours  budesonide 160 MICROgram(s)/formoterol 4.5 MICROgram(s) Inhaler 2 Puff(s) Inhalation two times a day  buMETAnide 2 milliGRAM(s) Oral daily  ferrous    sulfate 325 milliGRAM(s) Oral daily  heparin   Injectable 5000 Unit(s) SubCutaneous every 8 hours  hydrALAZINE 25 milliGRAM(s) Oral every 8 hours  lidocaine   4% Patch 1 Patch Transdermal every 24 hours  metoprolol succinate ER 25 milliGRAM(s) Oral daily  pantoprazole    Tablet 40 milliGRAM(s) Oral before breakfast  predniSONE   Tablet 20 milliGRAM(s) Oral daily  simvastatin 10 milliGRAM(s) Oral at bedtime    MEDICATIONS  (PRN):  ALBUTerol    90 MICROgram(s) HFA Inhaler 2 Puff(s) Inhalation every 6 hours PRN Shortness of Breath and/or Wheezing  aluminum hydroxide/magnesium hydroxide/simethicone Suspension 30 milliLiter(s) Oral every 4 hours PRN Dyspepsia  oxymetazoline 0.05% Nasal Spray 2 Spray(s) Nasal every 12 hours PRN Epistaxis  sodium chloride 0.65% Nasal 1 Spray(s) Both Nostrils every 8 hours PRN Nasal Congestion      EXAM:  Vital Signs Last 24 Hrs  T(C): 36.4 (02 Jan 2022 10:39), Max: 36.7 (01 Jan 2022 22:00)  T(F): 97.6 (02 Jan 2022 10:39), Max: 98.1 (02 Jan 2022 05:30)  HR: 82 (02 Jan 2022 10:39) (70 - 82)  BP: 140/76 (02 Jan 2022 10:39) (114/88 - 140/76)  BP(mean): --  RR: 18 (02 Jan 2022 10:39) (17 - 18)  SpO2: 98% (02 Jan 2022 10:39) (96% - 100%)    GENERAL: The patient is awake and alert, apperats uncomfortable  SKIN: Warm, dry, no rashes    LUNGS: Clear to auscultation without wheezing, rales or rhonchi; respirations unlabored    HEART: diffuse wheezing    ABDOMEN: +BS, Soft, Nontender    EXTREMITIES: No clubbing, cyanosis, edema                              8.6    9.25  )-----------( 371      ( 01 Jan 2022 08:09 )             30.3       01-01    137  |  95<L>  |  79<H>  ----------------------------<  78  4.6   |  29  |  1.83<H>    Ca    10.1      01 Jan 2022 08:09  Phos  2.9     01-01  Mg     2.40     01-01                    PROBLEM LIST:  71y Female with HEALTH ISSUES - PROBLEM Dx:  Chronic obstructive pulmonary disease, unspecified COPD type    Hypertension    CAD (coronary artery disease)    Prophylactic measure    Edema    High creatinine    Acute on chronic diastolic congestive heart failure      RECS:  repeat CXR    COVID/RVP swab  if cxr clear, increase steroids-change to solumedrol        Jaleel Kevin MD  [phone]

## 2022-01-02 NOTE — PROGRESS NOTE ADULT - SUBJECTIVE AND OBJECTIVE BOX
Jonas Frias MD  Interventional Cardiology / Endovascular Specialist  [place] Office : [address] [place], [place] [address]  Tel:   [place] Office : [address] [facility] [place] [address]  Tel: [phone]  Cell : [phone]    Subjective/Overnight events: Patient lying in bed comfortably. No acute distress.   	  MEDICATIONS:  buMETAnide 2 milliGRAM(s) Oral daily  heparin   Injectable 5000 Unit(s) SubCutaneous every 8 hours  hydrALAZINE 25 milliGRAM(s) Oral every 8 hours  metoprolol succinate ER 25 milliGRAM(s) Oral daily      ALBUTerol    90 MICROgram(s) HFA Inhaler 2 Puff(s) Inhalation every 6 hours PRN  benzonatate 100 milliGRAM(s) Oral every 8 hours  budesonide 160 MICROgram(s)/formoterol 4.5 MICROgram(s) Inhaler 2 Puff(s) Inhalation two times a day      aluminum hydroxide/magnesium hydroxide/simethicone Suspension 30 milliLiter(s) Oral every 4 hours PRN  pantoprazole    Tablet 40 milliGRAM(s) Oral before breakfast    predniSONE   Tablet 20 milliGRAM(s) Oral daily  simvastatin 10 milliGRAM(s) Oral at bedtime    ferrous    sulfate 325 milliGRAM(s) Oral daily  lidocaine   4% Patch 1 Patch Transdermal every 24 hours  oxymetazoline 0.05% Nasal Spray 2 Spray(s) Nasal every 12 hours PRN  sodium chloride 0.65% Nasal 1 Spray(s) Both Nostrils every 8 hours PRN      PAST MEDICAL/SURGICAL HISTORY  PAST MEDICAL & SURGICAL HISTORY:  Atrial fibrillation  S/P Ablation    Pulmonary hypertension    MIGUEL (obstructive sleep apnea)    COPD (chronic obstructive pulmonary disease)  on home O2    CHF (congestive heart failure)    HTN (hypertension)    Gout    Mitral valve replaced    Tricuspid valve replaced    CHF (congestive heart failure)    Hypertension    COPD (chronic obstructive pulmonary disease)    History of mitral valve replacement with mechanical valve    Tricuspid valve replaced  mechanical    No significant past surgical history        SOCIAL HISTORY: Substance Use (street drugs): ( x ) never used  (  ) other:    FAMILY HISTORY:  Family history of hypertension (Father, Sibling)    Family history of stroke    Family history of hypertension (Mother)        REVIEW OF SYSTEMS:  CONSTITUTIONAL: No fever, weight loss, or fatigue  EYES: No eye pain, visual disturbances, or discharge  ENMT:  No difficulty hearing, tinnitus, vertigo; No sinus or throat pain  BREASTS: No pain, masses, or nipple discharge  GASTROINTESTINAL: No abdominal or epigastric pain. No nausea, vomiting, or hematemesis; No diarrhea or constipation. No melena or hematochezia.  GENITOURINARY: No dysuria, frequency, hematuria, or incontinence  NEUROLOGICAL: No headaches, memory loss, loss of strength, numbness, or tremors  ENDOCRINE: No heat or cold intolerance; No hair loss  MUSCULOSKELETAL: No joint pain or swelling; No muscle, back, or extremity pain  PSYCHIATRIC: No depression, anxiety, mood swings, or difficulty sleeping  HEME/LYMPH: No easy bruising, or bleeding gums  All others negative    PHYSICAL EXAM:  T(C): 36.4 (01-02-22 @ 10:39), Max: 36.7 (01-01-22 @ 22:00)  HR: 82 (01-02-22 @ 10:39) (70 - 82)  BP: 140/76 (01-02-22 @ 10:39) (127/79 - 140/76)  RR: 18 (01-02-22 @ 10:39) (17 - 18)  SpO2: 98% (01-02-22 @ 10:39) (96% - 100%)  Wt(kg): --  I&O's Summary    EYES:   PERRLA   ENMT:   Moist mucous membranes, Good dentition, No lesions  Cardiovascular: Normal S1 S2, No JVD, No murmurs, No edema  Respiratory: b/l rhonchi and wheezing  Gastrointestinal:  Soft, Non-tender, + BS	  Extremities: 1+ edema                            8.6    9.25  )-----------( 371      ( 01 Jan 2022 08:09 )             30.3     01-01    137  |  95<L>  |  79<H>  ----------------------------<  78  4.6   |  29  |  1.83<H>    Ca    10.1      01 Jan 2022 08:09  Phos  2.9     01-01  Mg     2.40     01-01      proBNP:   Lipid Profile:   HgA1c:   TSH:     Consultant(s) Notes Reviewed:  [x ] YES  [ ] NO    Care Discussed with Consultants/Other Providers [ x] YES  [ ] NO    Imaging Personally Reviewed independently:  [x] YES  [ ] NO    All labs, radiologic studies, vitals, orders and medications list reviewed. Patient is seen and examined at bedside. Case discussed with medical team.

## 2022-01-02 NOTE — PROGRESS NOTE ADULT - SUBJECTIVE AND OBJECTIVE BOX
SUBJECTIVE / OVERNIGHT EVENTS: pt seen and examined    MEDICATIONS  (STANDING):  benzonatate 100 milliGRAM(s) Oral every 8 hours  budesonide 160 MICROgram(s)/formoterol 4.5 MICROgram(s) Inhaler 2 Puff(s) Inhalation two times a day  buMETAnide 2 milliGRAM(s) Oral daily  ferrous    sulfate 325 milliGRAM(s) Oral daily  heparin   Injectable 5000 Unit(s) SubCutaneous every 8 hours  hydrALAZINE 25 milliGRAM(s) Oral every 8 hours  lidocaine   4% Patch 1 Patch Transdermal every 24 hours  methylPREDNISolone sodium succinate Injectable 40 milliGRAM(s) IV Push every 8 hours  metoprolol succinate ER 25 milliGRAM(s) Oral daily  pantoprazole    Tablet 40 milliGRAM(s) Oral before breakfast  simvastatin 10 milliGRAM(s) Oral at bedtime    MEDICATIONS  (PRN):  acetaminophen     Tablet .. 650 milliGRAM(s) Oral every 6 hours PRN Moderate Pain (4 - 6)  ALBUTerol    90 MICROgram(s) HFA Inhaler 2 Puff(s) Inhalation every 6 hours PRN Shortness of Breath and/or Wheezing  aluminum hydroxide/magnesium hydroxide/simethicone Suspension 30 milliLiter(s) Oral every 4 hours PRN Dyspepsia  oxymetazoline 0.05% Nasal Spray 2 Spray(s) Nasal every 12 hours PRN Epistaxis  sodium chloride 0.65% Nasal 1 Spray(s) Both Nostrils every 8 hours PRN Nasal Congestion    Vital Signs Last 24 Hrs  T(C): 36.3 (22 @ 18:10), Max: 36.7 (22 @ 05:30)  T(F): 97.4 (22 @ 18:10), Max: 98.1 (22 @ 05:30)  HR: 70 (22 @ 18:10) (69 - 82)  BP: 139/68 (22 @ 18:10) (133/66 - 140/76)  BP(mean): --  RR: 18 (22 @ 18:10) (18 - 18)  SpO2: 98% (22 @ 18:10) (96% - 99%)      Constitutional: No fever, fatigue  Skin: No rash.  Eyes: No recent vision problems or eye pain.  ENT: No congestion, ear pain, or sore throat.  Cardiovascular: No chest pain or palpation.  Respiratory: No cough, shortness of breath, congestion, or wheezing.  Gastrointestinal: No abdominal pain, nausea, vomiting, or diarrhea.  Genitourinary: No dysuria.  Musculoskeletal: No joint swelling.  Neurologic: No headache.    PHYSICAL EXAM:  GENERAL: NAD  EYES: EOMI, PERRLA  NECK: Supple, No JVD  CHEST/LUNG: dec breath sounds at bases, fine exp wheezing+  HEART:  S1 , S2 +  ABDOMEN: soft , bs+  EXTREMITIES: + edema   NEUROLOGY:alert awake    LABS:      137  |  95<L>  |  79<H>  ----------------------------<  78  4.6   |  29  |  1.83<H>    Ca    10.1      2022 08:09  Phos  2.9       Mg     2.40           Creatinine Trend: 1.83 <--, 1.86 <--, 1.80 <--, 1.79 <--, 1.75 <--, 1.98 <--                        8.6    9.25  )-----------( 371      ( 2022 08:09 )             30.3     Urine Studies:  Urinalysis Basic - ( 27 Dec 2021 13:20 )    Color: Yellow / Appearance: Slightly Turbid / S.015 / pH:   Gluc:  / Ketone: Negative  / Bili: Negative / Urobili: <2 mg/dL   Blood:  / Protein: Negative / Nitrite: Negative   Leuk Esterase: Large / RBC: 9 /HPF / WBC 8 /HPF   Sq Epi:  / Non Sq Epi: 10 /HPF / Bacteria: Few                    Creatinine, Random Urine: 153 mg/dL ( @ 16:17)

## 2022-01-02 NOTE — PROGRESS NOTE ADULT - SUBJECTIVE AND OBJECTIVE BOX
[facility] NEPHROLOGY- PROGRESS NOTE    71y Female with history of COPD, CHF presents with dizziness. Nephrology consulted for elevated Scr.    REVIEW OF SYSTEMS:  Gen: no changes in weight  Cards: no chest pain  Resp: + dyspnea worsening +dry cough  GI: no nausea or vomiting or diarrhea  Vascular: + LE edema improving    No Known Allergies      Hospital Medications: Medications reviewed      VITALS:  T(F): 97.6 (22 @ 10:39), Max: 98.1 (22 @ 05:30)  HR: 82 (22 @ 10:39)  BP: 140/76 (22 @ 10:39)  RR: 18 (22 @ 10:39)  SpO2: 98% (22 @ 10:39)  Wt(kg): --      PHYSICAL EXAM:    Gen: NAD, calm  Cards: RRR, +S1/S2, no M/G/R  Resp: diffuse exp wheezing  GI: soft, NT/ND, NABS  Vascular: No LE edema B/L    LABS:      137  |  95<L>  |  79<H>  ----------------------------<  78  4.6   |  29  |  1.83<H>    Ca    10.1      2022 08:09  Phos  2.9       Mg     2.40           Creatinine Trend: 1.83 <--, 1.86 <--, 1.80 <--, 1.79 <--, 1.75 <--, 1.98 <--                        8.6    9.25  )-----------( 371      ( 2022 08:09 )             30.3     Urine Studies:  Urinalysis Basic - ( 27 Dec 2021 13:20 )    Color: Yellow / Appearance: Slightly Turbid / S.015 / pH:   Gluc:  / Ketone: Negative  / Bili: Negative / Urobili: <2 mg/dL   Blood:  / Protein: Negative / Nitrite: Negative   Leuk Esterase: Large / RBC: 9 /HPF / WBC 8 /HPF   Sq Epi:  / Non Sq Epi: 10 /HPF / Bacteria: Few      Creatinine, Random Urine: 153 mg/dL (12- @ 16:17)

## 2022-01-03 LAB
ANION GAP SERPL CALC-SCNC: 11 MMOL/L — SIGNIFICANT CHANGE UP (ref 7–14)
BASE EXCESS BLDV CALC-SCNC: 5.3 MMOL/L — HIGH (ref -2–3)
BUN SERPL-MCNC: 80 MG/DL — HIGH (ref 7–23)
CALCIUM SERPL-MCNC: 9.8 MG/DL — SIGNIFICANT CHANGE UP (ref 8.4–10.5)
CHLORIDE SERPL-SCNC: 96 MMOL/L — LOW (ref 98–107)
CO2 BLDV-SCNC: 37.2 MMOL/L — HIGH (ref 22–26)
CO2 SERPL-SCNC: 29 MMOL/L — SIGNIFICANT CHANGE UP (ref 22–31)
CREAT SERPL-MCNC: 1.82 MG/DL — HIGH (ref 0.5–1.3)
GAS PNL BLDV: SIGNIFICANT CHANGE UP
GLUCOSE SERPL-MCNC: 171 MG/DL — HIGH (ref 70–99)
HCO3 BLDV-SCNC: 35 MMOL/L — HIGH (ref 22–29)
HCT VFR BLD CALC: 31.5 % — LOW (ref 34.5–45)
HGB BLD-MCNC: 8.8 G/DL — LOW (ref 11.5–15.5)
MAGNESIUM SERPL-MCNC: 2.2 MG/DL — SIGNIFICANT CHANGE UP (ref 1.6–2.6)
MCHC RBC-ENTMCNC: 27.1 PG — SIGNIFICANT CHANGE UP (ref 27–34)
MCHC RBC-ENTMCNC: 27.9 GM/DL — LOW (ref 32–36)
MCV RBC AUTO: 96.9 FL — SIGNIFICANT CHANGE UP (ref 80–100)
NRBC # BLD: 0 /100 WBCS — SIGNIFICANT CHANGE UP
NRBC # FLD: 0.06 K/UL — HIGH
PCO2 BLDV: 76 MMHG — HIGH (ref 39–42)
PH BLDV: 7.27 — LOW (ref 7.32–7.43)
PHOSPHATE SERPL-MCNC: 4 MG/DL — SIGNIFICANT CHANGE UP (ref 2.5–4.5)
PLATELET # BLD AUTO: 353 K/UL — SIGNIFICANT CHANGE UP (ref 150–400)
PO2 BLDV: 48 MMHG — SIGNIFICANT CHANGE UP
POTASSIUM SERPL-MCNC: 4.9 MMOL/L — SIGNIFICANT CHANGE UP (ref 3.5–5.3)
POTASSIUM SERPL-SCNC: 4.9 MMOL/L — SIGNIFICANT CHANGE UP (ref 3.5–5.3)
RBC # BLD: 3.25 M/UL — LOW (ref 3.8–5.2)
RBC # FLD: 17.5 % — HIGH (ref 10.3–14.5)
SAO2 % BLDV: 76.1 % — SIGNIFICANT CHANGE UP
SODIUM SERPL-SCNC: 136 MMOL/L — SIGNIFICANT CHANGE UP (ref 135–145)
WBC # BLD: 7.8 K/UL — SIGNIFICANT CHANGE UP (ref 3.8–10.5)
WBC # FLD AUTO: 7.8 K/UL — SIGNIFICANT CHANGE UP (ref 3.8–10.5)

## 2022-01-03 PROCEDURE — 99223 1ST HOSP IP/OBS HIGH 75: CPT

## 2022-01-03 PROCEDURE — 99233 SBSQ HOSP IP/OBS HIGH 50: CPT

## 2022-01-03 RX ADMIN — Medication 40 MILLIGRAM(S): at 22:35

## 2022-01-03 RX ADMIN — Medication 25 MILLIGRAM(S): at 22:36

## 2022-01-03 RX ADMIN — Medication 25 MILLIGRAM(S): at 06:02

## 2022-01-03 RX ADMIN — HEPARIN SODIUM 5000 UNIT(S): 5000 INJECTION INTRAVENOUS; SUBCUTANEOUS at 13:45

## 2022-01-03 RX ADMIN — HEPARIN SODIUM 5000 UNIT(S): 5000 INJECTION INTRAVENOUS; SUBCUTANEOUS at 22:34

## 2022-01-03 RX ADMIN — BUDESONIDE AND FORMOTEROL FUMARATE DIHYDRATE 2 PUFF(S): 160; 4.5 AEROSOL RESPIRATORY (INHALATION) at 22:34

## 2022-01-03 RX ADMIN — Medication 25 MILLIGRAM(S): at 13:48

## 2022-01-03 RX ADMIN — Medication 325 MILLIGRAM(S): at 11:31

## 2022-01-03 RX ADMIN — BUDESONIDE AND FORMOTEROL FUMARATE DIHYDRATE 2 PUFF(S): 160; 4.5 AEROSOL RESPIRATORY (INHALATION) at 08:39

## 2022-01-03 RX ADMIN — PANTOPRAZOLE SODIUM 40 MILLIGRAM(S): 20 TABLET, DELAYED RELEASE ORAL at 06:00

## 2022-01-03 RX ADMIN — Medication 100 MILLIGRAM(S): at 06:01

## 2022-01-03 RX ADMIN — Medication 100 MILLIGRAM(S): at 13:45

## 2022-01-03 RX ADMIN — Medication 25 MILLIGRAM(S): at 06:04

## 2022-01-03 RX ADMIN — Medication 40 MILLIGRAM(S): at 13:41

## 2022-01-03 RX ADMIN — HEPARIN SODIUM 5000 UNIT(S): 5000 INJECTION INTRAVENOUS; SUBCUTANEOUS at 06:02

## 2022-01-03 RX ADMIN — LIDOCAINE 1 PATCH: 4 CREAM TOPICAL at 22:51

## 2022-01-03 RX ADMIN — Medication 40 MILLIGRAM(S): at 06:01

## 2022-01-03 RX ADMIN — Medication 100 MILLIGRAM(S): at 22:36

## 2022-01-03 RX ADMIN — BUMETANIDE 2 MILLIGRAM(S): 0.25 INJECTION INTRAMUSCULAR; INTRAVENOUS at 06:00

## 2022-01-03 RX ADMIN — SIMVASTATIN 10 MILLIGRAM(S): 20 TABLET, FILM COATED ORAL at 22:36

## 2022-01-03 NOTE — PROGRESS NOTE ADULT - ASSESSMENT
71y Female with history of COPD, CHF presents with dizziness. Nephrology consulted for elevated Scr.    1) MISAEL: secondary to diuresis. Scr improved and just mildly above baseline. Continue diuresis. Monitor renal fxn.  Avoid nephrotoxins.  UA active however contaminated and doubt patient with underlying GN. FeUrea low. Bladder scan negative.    2) CKD-3b: Baseline Scr 1.4-1.6. Outpatient CKD work up. Monitor electrolytes.    3) HTN with CKD: BP controlled. Continue with current medications. Monitor BP.    4) LE edema: Improving. Continue with bumex 2 mg PO daily. Elevated serum CO2 due to compensated respiratory acidosis as per blood gas. Prior TTE with grossly normal LVSF. Monitor UO.    5) Anemia: Hb low with iron deficiency s/p venofer. s/p Epo 10K X 1 on 12/30. Monitor Hb.    6) Hyperkalemia: resolved s/p lokelma. c/w low K diet. Monitor serum K.      [facility] NEPHROLOGY  Rodrigue Amador M.D.  Rob Perez D.O.  Ana Song M.D.  Lucrecia López, ALLISON, ANP-C    Telephone: [phone]  Facsimile: [phone]    [address] [place] [address]

## 2022-01-03 NOTE — CONSULT NOTE ADULT - SUBJECTIVE AND OBJECTIVE BOX
"HPI:    71 year old hx of HTN, dCHF, MV and TV replacement, MIGUEL not on CPAP due to unable to afford, afib s/p ablation not on AC due to chronic bleeding, unable to do c-scope or endoscopy due to medical conditions, COPD on 3 L NC, here for dizziness. x1 week, feeling generally weakness . walkes with walker at home.    Patient notes no falls or syncope, states she has felt this way before when she had "fluid in lungs" or when she was anemic.   Patient noted  nose bleed or 1 hr yesterday stopped  She was on coumadin 3 months ago for heart valves but was stopped - patient not sure why   She SOB when laying flat, legs more swollen, no cp. Denies fever, cough, recent illness, abdominal pain, melena or hematochezia.  Patient admitted with CHF, treated with bumex in ED, already notes less SOB  Patient noted she has been staying home due to COVID  She notes she did NOT take Covid vaccine- Pat counselled please to take vaccine (23 Dec 2021 14:38)"    Above reviewed. 70 yo F with CHF, MV/TV replacement, MIGUEL, initially with dizziness, weakness. Patient feels congested with cough. On oxygen. Mild shortness of breath. No chest pain, no abd pain. No dysuria, no pyuria. No other focal complaints. ID called for further eval.    PAST MEDICAL & SURGICAL HISTORY:  Atrial fibrillation  S/P Ablation    Pulmonary hypertension    MIGUEL (obstructive sleep apnea)    COPD (chronic obstructive pulmonary disease)  on home O2    CHF (congestive heart failure)    HTN (hypertension)    Gout    Mitral valve replaced    Tricuspid valve replaced    CHF (congestive heart failure)    Hypertension    COPD (chronic obstructive pulmonary disease)    History of mitral valve replacement with mechanical valve    Tricuspid valve replaced  mechanical    No significant past surgical history    Allergies    No Known Allergies    Intolerances    ANTIMICROBIALS:  Off    OTHER MEDS:  acetaminophen     Tablet .. 650 milliGRAM(s) Oral every 6 hours PRN  ALBUTerol    90 MICROgram(s) HFA Inhaler 2 Puff(s) Inhalation every 6 hours PRN  aluminum hydroxide/magnesium hydroxide/simethicone Suspension 30 milliLiter(s) Oral every 4 hours PRN  benzonatate 100 milliGRAM(s) Oral every 8 hours  budesonide 160 MICROgram(s)/formoterol 4.5 MICROgram(s) Inhaler 2 Puff(s) Inhalation two times a day  buMETAnide 2 milliGRAM(s) Oral daily  ferrous    sulfate 325 milliGRAM(s) Oral daily  heparin   Injectable 5000 Unit(s) SubCutaneous every 8 hours  hydrALAZINE 25 milliGRAM(s) Oral every 8 hours  lidocaine   4% Patch 1 Patch Transdermal every 24 hours  methylPREDNISolone sodium succinate Injectable 40 milliGRAM(s) IV Push every 8 hours  metoprolol succinate ER 25 milliGRAM(s) Oral daily  oxymetazoline 0.05% Nasal Spray 2 Spray(s) Nasal every 12 hours PRN  pantoprazole    Tablet 40 milliGRAM(s) Oral before breakfast  simvastatin 10 milliGRAM(s) Oral at bedtime  sodium chloride 0.65% Nasal 1 Spray(s) Both Nostrils every 8 hours PRN    SOCIAL HISTORY: No tobacco, no alcohol, no illicit drugs    FAMILY HISTORY:  Family history of hypertension (Father, Sibling)    Family history of stroke    Family history of hypertension (Mother)    Drug Dosing Weight  Height (cm): 162.6 (23 Dec 2021 09:17)  Weight (kg): 109 (18 Jun 2021 16:37)  BMI (kg/m2): 41.2 (23 Dec 2021 09:17)  BSA (m2): 2.12 (23 Dec 2021 09:17)    PE:    Vital Signs Last 24 Hrs  T(C): 36.7 (03 Jan 2022 13:40), Max: 36.8 (03 Jan 2022 05:58)  T(F): 98.1 (03 Jan 2022 13:40), Max: 98.2 (03 Jan 2022 05:58)  HR: 68 (03 Jan 2022 13:40) (68 - 72)  BP: 126/69 (03 Jan 2022 13:40) (126/69 - 156/78)  RR: 18 (03 Jan 2022 13:40) (18 - 18)  SpO2: 98% (03 Jan 2022 13:40) (97% - 99%)    Gen: AOx3, NAD, non-toxic, pleasant  CV: S1+S2 normal, nontachycardic  Resp: Clear bilat, no resp distress, no crackles/wheezes  Abd: Soft, nontender, +BS  Ext: No LE edema, no wounds  : No Junior  IV/Skin: No thrombophlebitis  Msk: No low back pain, no arthralgias, no joint swelling  Neuro: No sensory deficits, no motor deficits    LABS:                        8.8    7.80  )-----------( 353      ( 03 Jan 2022 07:21 )             31.5     01-03    136  |  96<L>  |  80<H>  ----------------------------<  171<H>  4.9   |  29  |  1.82<H>    Ca    9.8      03 Jan 2022 07:21  Phos  4.0     01-03  Mg     2.20     01-03    MICROBIOLOGY:    Rapid RVP Result: Detected (01-02 @ 17:52) hMPV    RADIOLOGY:    1/2 XR:    FINDINGS: The heart size cannot be adequately assessed on this single   view. The patient is status post median sternotomy and valve repair.   There is moderate pulmonary vascular congestion, improved. The patient's   chin obscures the bilateral lung apices.    IMPRESSION: Moderate pulmonary vascular congestion, improved..

## 2022-01-03 NOTE — CONSULT NOTE ADULT - ASSESSMENT
72 yo F with CHF, MV/TV replacement, MIGUEL, initially with dizziness, weakness  No fever, no leukocytosis  Suspected viral URI  RVP human Metapneumovirus  On treatment for COPD/CHF  Overall,  1) New Viral URI  - RVP+ hMPV  - Supportive care  - Contact precautions per protocol  - Monitor for signs secondary bacterial pneumonia (monitor off abx for now)  2) SOB  - Follow up pulmonary--CHF/COPD  3) CKD  - Trend Cr to baseline    Thee Gupta MD  Pager [phone]  From 5pm-9am, and on weekends call [phone]

## 2022-01-03 NOTE — DIETITIAN INITIAL EVALUATION ADULT. - PERTINENT MEDS FT
MEDICATIONS  (STANDING):  benzonatate 100 milliGRAM(s) Oral every 8 hours  budesonide 160 MICROgram(s)/formoterol 4.5 MICROgram(s) Inhaler 2 Puff(s) Inhalation two times a day  buMETAnide 2 milliGRAM(s) Oral daily  ferrous    sulfate 325 milliGRAM(s) Oral daily  heparin   Injectable 5000 Unit(s) SubCutaneous every 8 hours  hydrALAZINE 25 milliGRAM(s) Oral every 8 hours  lidocaine   4% Patch 1 Patch Transdermal every 24 hours  methylPREDNISolone sodium succinate Injectable 40 milliGRAM(s) IV Push every 8 hours  metoprolol succinate ER 25 milliGRAM(s) Oral daily  pantoprazole    Tablet 40 milliGRAM(s) Oral before breakfast  simvastatin 10 milliGRAM(s) Oral at bedtime    MEDICATIONS  (PRN):  acetaminophen     Tablet .. 650 milliGRAM(s) Oral every 6 hours PRN Moderate Pain (4 - 6)  ALBUTerol    90 MICROgram(s) HFA Inhaler 2 Puff(s) Inhalation every 6 hours PRN Shortness of Breath and/or Wheezing  aluminum hydroxide/magnesium hydroxide/simethicone Suspension 30 milliLiter(s) Oral every 4 hours PRN Dyspepsia  oxymetazoline 0.05% Nasal Spray 2 Spray(s) Nasal every 12 hours PRN Epistaxis  sodium chloride 0.65% Nasal 1 Spray(s) Both Nostrils every 8 hours PRN Nasal Congestion

## 2022-01-03 NOTE — PROGRESS NOTE ADULT - SUBJECTIVE AND OBJECTIVE BOX
PULMONARY PROGRESS NOTE    DAMARI GUERRERO  MRN-[id number]    Patient is a 71y old  Female who presents with a chief complaint of dizziness and sOB (02 Jan 2022 10:47)      HPI:  has virus  wheeze on and off        MEDICATIONS  (STANDING):  benzonatate 100 milliGRAM(s) Oral every 8 hours  budesonide 160 MICROgram(s)/formoterol 4.5 MICROgram(s) Inhaler 2 Puff(s) Inhalation two times a day  buMETAnide 2 milliGRAM(s) Oral daily  ferrous    sulfate 325 milliGRAM(s) Oral daily  heparin   Injectable 5000 Unit(s) SubCutaneous every 8 hours  hydrALAZINE 25 milliGRAM(s) Oral every 8 hours  lidocaine   4% Patch 1 Patch Transdermal every 24 hours  methylPREDNISolone sodium succinate Injectable 40 milliGRAM(s) IV Push every 8 hours  metoprolol succinate ER 25 milliGRAM(s) Oral daily  pantoprazole    Tablet 40 milliGRAM(s) Oral before breakfast  simvastatin 10 milliGRAM(s) Oral at bedtime    MEDICATIONS  (PRN):  acetaminophen     Tablet .. 650 milliGRAM(s) Oral every 6 hours PRN Moderate Pain (4 - 6)  ALBUTerol    90 MICROgram(s) HFA Inhaler 2 Puff(s) Inhalation every 6 hours PRN Shortness of Breath and/or Wheezing  aluminum hydroxide/magnesium hydroxide/simethicone Suspension 30 milliLiter(s) Oral every 4 hours PRN Dyspepsia  oxymetazoline 0.05% Nasal Spray 2 Spray(s) Nasal every 12 hours PRN Epistaxis  sodium chloride 0.65% Nasal 1 Spray(s) Both Nostrils every 8 hours PRN Nasal Congestion        EXAM:  Vital Signs Last 24 Hrs  T(C): 36.7 (01-03-22 @ 13:40), Max: 36.8 (01-03-22 @ 05:58)  T(F): 98.1 (01-03-22 @ 13:40), Max: 98.2 (01-03-22 @ 05:58)  HR: 68 (01-03-22 @ 13:40) (68 - 72)  BP: 126/69 (01-03-22 @ 13:40) (126/69 - 156/78)  BP(mean): --  RR: 18 (01-03-22 @ 13:40) (18 - 18)  SpO2: 98% (01-03-22 @ 13:40) (96% - 99%)        GENERAL: The patient is awake and alert no distress  LUNGS: bilat wheeze                            8.8    7.80  )-----------( 353      ( 03 Jan 2022 07:21 )             31.5   01-03    136  |  96<L>  |  80<H>  ----------------------------<  171<H>  4.9   |  29  |  1.82<H>    Ca    9.8      03 Jan 2022 07:21  Phos  4.0     01-03  Mg     2.20     01-03      < from: Xray Chest 1 View AP/PA (12.30.21 @ 16:46) >  ACC: [id number] EXAM:  XR CHEST AP OR PA 1V                          PROCEDURE DATE:  12/30/2021          INTERPRETATION:  Chest one view    HISTORY: Shortness of breath    COMPARISON STUDY: 12/23/2021    Frontal expiratory view of the chest shows the heart to be similarly   enlarged in size. Sternal wires and cardiac valve ring are again noted.    The lungs show increased pulmonary congestion with small pleural   effusions and there is no evidence of pneumothorax.    IMPRESSION:  Increased congestion.        Thank you for the courtesy of this referral.    --- End of Report ---    < end of copied text >    PROBLEM LIST:  71y Female with HEALTH ISSUES - PROBLEM Dx:  Chronic obstructive pulmonary disease, unspecified COPD type    Hypertension    CAD (coronary artery disease)    Prophylactic measure    Edema    High creatinine    Acute on chronic diastolic congestive heart failure      RECS:    -cont steroids  -cont supportive care for viral infection  -bipap during sleep   -supplemental o2  -incentive jovanny        Em Merida MD  [phone]

## 2022-01-03 NOTE — PROGRESS NOTE ADULT - SUBJECTIVE AND OBJECTIVE BOX
Jonas Frias MD  Interventional Cardiology / Endovascular Specialist  [place] Office : [address] [place] [address] [place]. [address]  Tel:   [place] Office : [address] [facility] [place] [address]  Tel: [phone]  Cell : [phone]    Subjective/Overnight events: Patient lying in bed comfortably. No acute distress.   	  MEDICATIONS:  buMETAnide 2 milliGRAM(s) Oral daily  heparin   Injectable 5000 Unit(s) SubCutaneous every 8 hours  hydrALAZINE 25 milliGRAM(s) Oral every 8 hours  metoprolol succinate ER 25 milliGRAM(s) Oral daily      ALBUTerol    90 MICROgram(s) HFA Inhaler 2 Puff(s) Inhalation every 6 hours PRN  benzonatate 100 milliGRAM(s) Oral every 8 hours  budesonide 160 MICROgram(s)/formoterol 4.5 MICROgram(s) Inhaler 2 Puff(s) Inhalation two times a day    acetaminophen     Tablet .. 650 milliGRAM(s) Oral every 6 hours PRN    aluminum hydroxide/magnesium hydroxide/simethicone Suspension 30 milliLiter(s) Oral every 4 hours PRN  pantoprazole    Tablet 40 milliGRAM(s) Oral before breakfast    methylPREDNISolone sodium succinate Injectable 40 milliGRAM(s) IV Push every 8 hours  simvastatin 10 milliGRAM(s) Oral at bedtime    ferrous    sulfate 325 milliGRAM(s) Oral daily  lidocaine   4% Patch 1 Patch Transdermal every 24 hours  oxymetazoline 0.05% Nasal Spray 2 Spray(s) Nasal every 12 hours PRN  sodium chloride 0.65% Nasal 1 Spray(s) Both Nostrils every 8 hours PRN      PAST MEDICAL/SURGICAL HISTORY  PAST MEDICAL & SURGICAL HISTORY:  Atrial fibrillation  S/P Ablation    Pulmonary hypertension    MIGUEL (obstructive sleep apnea)    COPD (chronic obstructive pulmonary disease)  on home O2    CHF (congestive heart failure)    HTN (hypertension)    Gout    Mitral valve replaced    Tricuspid valve replaced    CHF (congestive heart failure)    Hypertension    COPD (chronic obstructive pulmonary disease)    History of mitral valve replacement with mechanical valve    Tricuspid valve replaced  mechanical    No significant past surgical history        SOCIAL HISTORY: Substance Use (street drugs): ( x ) never used  (  ) other:    FAMILY HISTORY:  Family history of hypertension (Father, Sibling)    Family history of stroke    Family history of hypertension (Mother)          PHYSICAL EXAM:  T(C): 36.8 (01-03-22 @ 05:58), Max: 36.8 (01-03-22 @ 05:58)  HR: 68 (01-03-22 @ 07:55) (68 - 72)  BP: 138/68 (01-03-22 @ 05:58) (138/68 - 156/78)  RR: 18 (01-03-22 @ 05:58) (18 - 18)  SpO2: 98% (01-03-22 @ 07:55) (96% - 99%)  Wt(kg): --  I&O's Summary        EYES:   PERRLA   ENMT:   Moist mucous membranes, Good dentition, No lesions  Cardiovascular: Normal S1 S2, No JVD, No murmurs, No edema  Respiratory: b/l rhonchi and wheezing  Gastrointestinal:  Soft, Non-tender, + BS	  Extremities: 1+ edema                                      8.8    7.80  )-----------( 353      ( 03 Jan 2022 07:21 )             31.5     01-03    136  |  96<L>  |  80<H>  ----------------------------<  171<H>  4.9   |  29  |  1.82<H>    Ca    9.8      03 Jan 2022 07:21  Phos  4.0     01-03  Mg     2.20     01-03      proBNP:   Lipid Profile:   HgA1c:   TSH:     Consultant(s) Notes Reviewed:  [x ] YES  [ ] NO    Care Discussed with Consultants/Other Providers [ x] YES  [ ] NO    Imaging Personally Reviewed independently:  [x] YES  [ ] NO    All labs, radiologic studies, vitals, orders and medications list reviewed. Patient is seen and examined at bedside. Case discussed with medical team.                 Jonas Frias MD  Interventional Cardiology / Endovascular Specialist  [place] Office : [address] [place] [address] [place]. [address]  Tel:   [place] Office : [address] [facility] [place] [address]  Tel: [phone]  Cell : [phone]    Subjective/Overnight events: Patient lying in bed comfortably. No acute distress.   	  MEDICATIONS:  buMETAnide 2 milliGRAM(s) Oral daily  heparin   Injectable 5000 Unit(s) SubCutaneous every 8 hours  hydrALAZINE 25 milliGRAM(s) Oral every 8 hours  metoprolol succinate ER 25 milliGRAM(s) Oral daily      ALBUTerol    90 MICROgram(s) HFA Inhaler 2 Puff(s) Inhalation every 6 hours PRN  benzonatate 100 milliGRAM(s) Oral every 8 hours  budesonide 160 MICROgram(s)/formoterol 4.5 MICROgram(s) Inhaler 2 Puff(s) Inhalation two times a day    acetaminophen     Tablet .. 650 milliGRAM(s) Oral every 6 hours PRN    aluminum hydroxide/magnesium hydroxide/simethicone Suspension 30 milliLiter(s) Oral every 4 hours PRN  pantoprazole    Tablet 40 milliGRAM(s) Oral before breakfast    methylPREDNISolone sodium succinate Injectable 40 milliGRAM(s) IV Push every 8 hours  simvastatin 10 milliGRAM(s) Oral at bedtime    ferrous    sulfate 325 milliGRAM(s) Oral daily  lidocaine   4% Patch 1 Patch Transdermal every 24 hours  oxymetazoline 0.05% Nasal Spray 2 Spray(s) Nasal every 12 hours PRN  sodium chloride 0.65% Nasal 1 Spray(s) Both Nostrils every 8 hours PRN      PAST MEDICAL/SURGICAL HISTORY  PAST MEDICAL & SURGICAL HISTORY:  Atrial fibrillation  S/P Ablation    Pulmonary hypertension    MIGUEL (obstructive sleep apnea)    COPD (chronic obstructive pulmonary disease)  on home O2    CHF (congestive heart failure)    HTN (hypertension)    Gout    Mitral valve replaced    Tricuspid valve replaced    CHF (congestive heart failure)    Hypertension    COPD (chronic obstructive pulmonary disease)    History of mitral valve replacement with mechanical valve    Tricuspid valve replaced  mechanical    No significant past surgical history        SOCIAL HISTORY: Substance Use (street drugs): ( x ) never used  (  ) other:    FAMILY HISTORY:  Family history of hypertension (Father, Sibling)    Family history of stroke    Family history of hypertension (Mother)          PHYSICAL EXAM:  T(C): 36.8 (01-03-22 @ 05:58), Max: 36.8 (01-03-22 @ 05:58)  HR: 68 (01-03-22 @ 07:55) (68 - 72)  BP: 138/68 (01-03-22 @ 05:58) (138/68 - 156/78)  RR: 18 (01-03-22 @ 05:58) (18 - 18)  SpO2: 98% (01-03-22 @ 07:55) (96% - 99%)  Wt(kg): --  I&O's Summary        EYES:   PERRLA   ENMT:   Moist mucous membranes, Good dentition, No lesions  Cardiovascular: Normal S1 S2, No JVD, No murmurs, No edema  Respiratory: b/l wheezing  Gastrointestinal:  Soft, Non-tender, + BS	  Extremities: 1+ edema                                      8.8    7.80  )-----------( 353      ( 03 Jan 2022 07:21 )             31.5     01-03    136  |  96<L>  |  80<H>  ----------------------------<  171<H>  4.9   |  29  |  1.82<H>    Ca    9.8      03 Jan 2022 07:21  Phos  4.0     01-03  Mg     2.20     01-03      proBNP:   Lipid Profile:   HgA1c:   TSH:     Consultant(s) Notes Reviewed:  [x ] YES  [ ] NO    Care Discussed with Consultants/Other Providers [ x] YES  [ ] NO    Imaging Personally Reviewed independently:  [x] YES  [ ] NO    All labs, radiologic studies, vitals, orders and medications list reviewed. Patient is seen and examined at bedside. Case discussed with medical team.

## 2022-01-03 NOTE — PROGRESS NOTE ADULT - SUBJECTIVE AND OBJECTIVE BOX
Full note to follow. [facility] NEPHROLOGY- PROGRESS NOTE    71y Female with history of COPD, CHF presents with dizziness. Nephrology consulted for elevated Scr.    REVIEW OF SYSTEMS:  Gen: no changes in weight  Cards: no chest pain  Resp: + dyspnea , +dry cough  GI: no nausea or vomiting or diarrhea  Vascular: + LE edema improving    No Known Allergies      Hospital Medications: Medications reviewed      VITALS:  T(F): 98.1 (01-03-22 @ 13:40), Max: 98.2 (01-03-22 @ 05:58)  HR: 69 (01-03-22 @ 19:16)  BP: 126/69 (01-03-22 @ 13:40)  RR: 18 (01-03-22 @ 13:40)  SpO2: 99% (01-03-22 @ 19:16)          PHYSICAL EXAM:    Gen: NAD, calm  Cards: RRR, +S1/S2, no M/G/R  Resp: diffuse exp wheezing, a few scattered crackles  GI: soft, NT/ND, NABS  Vascular: +LE edema B/L      LABS:  01-03    136  |  96<L>  |  80<H>  ----------------------------<  171<H>  4.9   |  29  |  1.82<H>    Ca    9.8      03 Jan 2022 07:21  Phos  4.0     01-03  Mg     2.20     01-03      Creatinine Trend: 1.82 <--, 1.83 <--, 1.86 <--, 1.80 <--, 1.79 <--, 1.75 <--                        8.8    7.80  )-----------( 353      ( 03 Jan 2022 07:21 )             31.5

## 2022-01-03 NOTE — DIETITIAN INITIAL EVALUATION ADULT. - ADD RECOMMEND
1. Monitor weights, labs, BM's, skin integrity, PO intake/tolerance. 2. Encourage po intake, assist with meals and menu selections, provide alternatives PRN. 3. Defer fluid restriction to MD order. 4. Recommend patient to follow with outpatient RDN for long term weight management.

## 2022-01-03 NOTE — DIETITIAN INITIAL EVALUATION ADULT. - OTHER INFO
Per chart, Patient is a 71 year old hx of HTN, dCHF, MV and TV replacement, MIGUEL not on CPAP due to unable to afford, afib s/p ablation not on AC due to chronic bleeding, unable to do c-scope or endoscopy due to medical conditions, COPD on 3 L NC, gout, who presents due to acute sob. HMPV positive 1/2.    Patient with decreased appetite and po intake 2/2 wheezing and coughing in setting of HMPV +. No chewing/swallowing difficulties at meals at this time. Noted patient vomited this AM with blood in vomit and + black tarry stool, ACP tem is aware. NKFA reported. No new food preferences obtained at this time. Continue to encourage PO intake as tolerated. On steroid rx per MD order which may aggravate glucose level. Noted K+ now WNL, Na+, Phos, Mg also WNL.     Skin: No pressure injuries  Edema: L+R legs 3+ per flowsheet, on bumex and fluid restriction per MD order.

## 2022-01-03 NOTE — DIETITIAN INITIAL EVALUATION ADULT. - PERTINENT LABORATORY DATA
01-03 Na136 mmol/L Glu 171 mg/dL<H> K+ 4.9 mmol/L Cr  1.82 mg/dL<H> BUN 80 mg/dL<H> 01-03 Phos 4.0 mg/dL

## 2022-01-03 NOTE — PROGRESS NOTE ADULT - ASSESSMENT
71 year old hx of HTN, dCHF, MV and TV replacement, MIGUEL not on CPAP due to unable to afford, afib s/p ablation not on AC due to chronic bleeding, unable to do c-scope or endoscopy due to medical conditions, COPD on 3 L NC, here for dizziness. x1 week, feeling generally weakness .    EKG - NSR RBBB  Tele - episodes of Afib   Echo in 9/21 shows normal LV s/p MVR     1) Acute on chronic diastolic CHF   - bumex 2mg po daily   - echo in 9/21 shows normal LV s/p MVR  - c/w bumex  - monitor urine out put, daily weights    2) COPD   - on O2   - c/w  steroids   - f/u pulm     3) Afib  - not on AC, pt doesn't remember why , as per chart sec to GI bleed  - cont toprol     4) MISAEL  -f/u renal recs    5) DVT prophylaxis   - on sc heparin 71 year old hx of HTN, dCHF, MV and TV replacement, MIGUEL not on CPAP due to unable to afford, afib s/p ablation not on AC due to chronic bleeding, unable to do c-scope or endoscopy due to medical conditions, COPD on 3 L NC, here for dizziness. x1 week, feeling generally weakness .    EKG - NSR RBBB  Tele - episodes of Afib   Echo in 9/21 shows normal LV s/p MVR     1) Acute on chronic diastolic CHF   - bumex 2mg po daily   - echo in 9/21 shows normal LV s/p MVR  - c/w bumex  - monitor urine out put, daily weights    2) COPD   - on O2   - c/w  steroids still wheezing   - f/u pulm     3) Afib  - not on AC, pt doesn't remember why , as per chart sec to GI bleed  - cont toprol     4) MISAEL  -f/u renal recs    5) DVT prophylaxis   - on sc heparin

## 2022-01-03 NOTE — DIETITIAN INITIAL EVALUATION ADULT. - PROBLEM SELECTOR PLAN 1
Tele, Una, Echo  C/w Bumex QD IV  Abnormal EKG Monitor HS trop  Monitor pro Bmp in AM  Keep K >4 on diuretics  Cardiology consult called to Dr Rose Mary Frias

## 2022-01-03 NOTE — CONSULT NOTE ADULT - CONSULT REQUESTED DATE/TIME
Dulce with [facility] calling regarding patient's prior authorization.  She is requesting a return call to see if prior authorization has been submitted to [employer].  Dulce can be reached at [phone], ext. [phone].  Please advise.   
Left message for Dulce with [facility] that [employer] prior authorization was submitted and approved per referral notes on 8/11/17.     Dr. Mahi Salas approved per [facility] referral notes on 8/11/17 with following message (not sure if your staff was contacted from [facility]):  [employer] -  Approved  Authorization Number: [id number]  Valid from 08-11-17 to 11-09-17  Please send script to [facility]   Pharmacy Coverage: [employer]   Patient's Co-Pay: $   Co pay Assistance Information   Patient enrolled in drug copay card.   Patient was approved for copay assistance through   Copay after assistance is: $.  Pharmacy should bill co pay to: .  The following information (i.e. Labs) will be required for reauthorization: n/a   Additional Information:   n/a   Lizz Yousif   8/11/17   
24-Dec-2021 11:42
25-Dec-2021 09:14
28-Dec-2021 09:00
03-Jan-2022 17:22
No

## 2022-01-04 LAB
ANION GAP SERPL CALC-SCNC: 15 MMOL/L — HIGH (ref 7–14)
BASE EXCESS BLDA CALC-SCNC: 3.6 MMOL/L — HIGH (ref -2–3)
BUN SERPL-MCNC: 98 MG/DL — HIGH (ref 7–23)
CALCIUM SERPL-MCNC: 9.8 MG/DL — SIGNIFICANT CHANGE UP (ref 8.4–10.5)
CHLORIDE SERPL-SCNC: 95 MMOL/L — LOW (ref 98–107)
CO2 BLDA-SCNC: 36 MMOL/L — HIGH (ref 19–24)
CO2 SERPL-SCNC: 24 MMOL/L — SIGNIFICANT CHANGE UP (ref 22–31)
CREAT SERPL-MCNC: 1.81 MG/DL — HIGH (ref 0.5–1.3)
GLUCOSE SERPL-MCNC: 171 MG/DL — HIGH (ref 70–99)
HCO3 BLDA-SCNC: 33 MMOL/L — HIGH (ref 21–28)
HCT VFR BLD CALC: 32.9 % — LOW (ref 34.5–45)
HGB BLD-MCNC: 9 G/DL — LOW (ref 11.5–15.5)
MAGNESIUM SERPL-MCNC: 2.4 MG/DL — SIGNIFICANT CHANGE UP (ref 1.6–2.6)
MCHC RBC-ENTMCNC: 27.3 PG — SIGNIFICANT CHANGE UP (ref 27–34)
MCHC RBC-ENTMCNC: 27.4 GM/DL — LOW (ref 32–36)
MCV RBC AUTO: 99.7 FL — SIGNIFICANT CHANGE UP (ref 80–100)
NRBC # BLD: 0 /100 WBCS — SIGNIFICANT CHANGE UP
NRBC # FLD: 0.03 K/UL — HIGH
PCO2 BLDA: 76 MMHG — CRITICAL HIGH (ref 32–35)
PH BLDA: 7.25 — LOW (ref 7.35–7.45)
PHOSPHATE SERPL-MCNC: 4.9 MG/DL — HIGH (ref 2.5–4.5)
PLATELET # BLD AUTO: 316 K/UL — SIGNIFICANT CHANGE UP (ref 150–400)
PO2 BLDA: 64 MMHG — LOW (ref 83–108)
POTASSIUM SERPL-MCNC: 5.4 MMOL/L — HIGH (ref 3.5–5.3)
POTASSIUM SERPL-SCNC: 5.4 MMOL/L — HIGH (ref 3.5–5.3)
RBC # BLD: 3.3 M/UL — LOW (ref 3.8–5.2)
RBC # FLD: 17.5 % — HIGH (ref 10.3–14.5)
SAO2 % BLDA: 91.9 % — LOW (ref 94–98)
SODIUM SERPL-SCNC: 134 MMOL/L — LOW (ref 135–145)
WBC # BLD: 7.29 K/UL — SIGNIFICANT CHANGE UP (ref 3.8–10.5)
WBC # FLD AUTO: 7.29 K/UL — SIGNIFICANT CHANGE UP (ref 3.8–10.5)

## 2022-01-04 PROCEDURE — 99232 SBSQ HOSP IP/OBS MODERATE 35: CPT

## 2022-01-04 PROCEDURE — 99233 SBSQ HOSP IP/OBS HIGH 50: CPT

## 2022-01-04 RX ORDER — BUMETANIDE 0.25 MG/ML
2 INJECTION INTRAMUSCULAR; INTRAVENOUS ONCE
Refills: 0 | Status: COMPLETED | OUTPATIENT
Start: 2022-01-04 | End: 2022-01-04

## 2022-01-04 RX ADMIN — SIMVASTATIN 10 MILLIGRAM(S): 20 TABLET, FILM COATED ORAL at 22:27

## 2022-01-04 RX ADMIN — Medication 40 MILLIGRAM(S): at 14:01

## 2022-01-04 RX ADMIN — Medication 100 MILLIGRAM(S): at 14:01

## 2022-01-04 RX ADMIN — Medication 25 MILLIGRAM(S): at 05:45

## 2022-01-04 RX ADMIN — PANTOPRAZOLE SODIUM 40 MILLIGRAM(S): 20 TABLET, DELAYED RELEASE ORAL at 05:46

## 2022-01-04 RX ADMIN — HEPARIN SODIUM 5000 UNIT(S): 5000 INJECTION INTRAVENOUS; SUBCUTANEOUS at 14:01

## 2022-01-04 RX ADMIN — Medication 100 MILLIGRAM(S): at 22:27

## 2022-01-04 RX ADMIN — HEPARIN SODIUM 5000 UNIT(S): 5000 INJECTION INTRAVENOUS; SUBCUTANEOUS at 22:26

## 2022-01-04 RX ADMIN — Medication 650 MILLIGRAM(S): at 03:33

## 2022-01-04 RX ADMIN — HEPARIN SODIUM 5000 UNIT(S): 5000 INJECTION INTRAVENOUS; SUBCUTANEOUS at 05:46

## 2022-01-04 RX ADMIN — Medication 25 MILLIGRAM(S): at 14:01

## 2022-01-04 RX ADMIN — BUDESONIDE AND FORMOTEROL FUMARATE DIHYDRATE 2 PUFF(S): 160; 4.5 AEROSOL RESPIRATORY (INHALATION) at 09:20

## 2022-01-04 RX ADMIN — BUMETANIDE 2 MILLIGRAM(S): 0.25 INJECTION INTRAMUSCULAR; INTRAVENOUS at 05:46

## 2022-01-04 RX ADMIN — Medication 100 MILLIGRAM(S): at 05:46

## 2022-01-04 RX ADMIN — BUDESONIDE AND FORMOTEROL FUMARATE DIHYDRATE 2 PUFF(S): 160; 4.5 AEROSOL RESPIRATORY (INHALATION) at 22:25

## 2022-01-04 RX ADMIN — BUMETANIDE 2 MILLIGRAM(S): 0.25 INJECTION INTRAMUSCULAR; INTRAVENOUS at 18:20

## 2022-01-04 RX ADMIN — Medication 40 MILLIGRAM(S): at 22:28

## 2022-01-04 RX ADMIN — Medication 40 MILLIGRAM(S): at 05:46

## 2022-01-04 RX ADMIN — Medication 325 MILLIGRAM(S): at 11:47

## 2022-01-04 RX ADMIN — Medication 25 MILLIGRAM(S): at 22:27

## 2022-01-04 RX ADMIN — LIDOCAINE 1 PATCH: 4 CREAM TOPICAL at 10:15

## 2022-01-04 RX ADMIN — Medication 25 MILLIGRAM(S): at 05:46

## 2022-01-04 NOTE — PROGRESS NOTE ADULT - ASSESSMENT
71y Female with history of COPD, CHF presents with dizziness. Nephrology consulted for elevated Scr.    1) MISAEL: secondary to HF and infection. Scr improved and near baseline. UA active however contaminated and doubt patient with underlying GN. FeUrea low. Bladder scan negative. Avoid nephrotoxins.    2) CKD-3b: Baseline Scr 1.4-1.6. Outpatient CKD work up. Monitor electrolytes.    3) HTN with CKD: BP controlled. Continue with current medications. Monitor BP.    4) LE edema: Improving. Continue with bumex 2 mg PO daily but will give an additional dose this evening given pulmonary congestion on CXR. Elevated serum CO2 due to respiratory acidosis which is not compensated as per blood gas. Prior TTE with grossly normal LVSF. Monitor UO.    5) Anemia: Hb low with iron deficiency s/p venofer s/p Epo 10K X 1 dose on 12/30. Monitor Hb.    6) Hyperkalemia: Due to hemolyzed specimen. Continue with low K diet. Monitor serum K.      [facility] NEPHROLOGY  Rodrigue Amador M.D.  Rob Perez D.O.  Ana Song M.D.  Lucrecia López, MSN, ANP-C    Telephone: [phone]  Facsimile: [phone]    [address] [place] [address]

## 2022-01-04 NOTE — PROGRESS NOTE ADULT - SUBJECTIVE AND OBJECTIVE BOX
CC: F/U for Viral URI    Saw/spoke to patient. Unchanged.    Allergies  No Known Allergies    ANTIMICROBIALS:  Off    PE:    Vital Signs Last 24 Hrs  T(C): 36.8 (04 Jan 2022 08:00), Max: 36.8 (04 Jan 2022 08:00)  T(F): 98.3 (04 Jan 2022 08:00), Max: 98.3 (04 Jan 2022 08:00)  HR: 78 (04 Jan 2022 11:15) (60 - 85)  BP: 132/61 (04 Jan 2022 09:15) (126/72 - 147/63)  RR: 18 (04 Jan 2022 09:15) (18 - 18)  SpO2: 96% (04 Jan 2022 11:15) (95% - 100%)    Gen: AOx3, NAD, non-toxic  CV: S1+S2 normal, nontachycardic  Resp: Clear bilat, no resp distress, no crackles/wheezes  Abd: Soft, nontender, +BS  Ext: No LE edema, no wounds    LABS:                        9.0    7.29  )-----------( 316      ( 04 Jan 2022 06:56 )             32.9     01-04    134<L>  |  95<L>  |  98<H>  ----------------------------<  171<H>  5.4<H>   |  24  |  1.81<H>    Ca    9.8      04 Jan 2022 06:56  Phos  4.9     01-04  Mg     2.40     01-04    MICROBIOLOGY:    Rapid RVP Result: Detected (01-02 @ 17:52) hMPV    RADIOLOGY:    1/2 XR:    FINDINGS: The heart size cannot be adequately assessed on this single   view. The patient is status post median sternotomy and valve repair.   There is moderate pulmonary vascular congestion, improved. The patient's   chin obscures the bilateral lung apices.    IMPRESSION: Moderate pulmonary vascular congestion, improved..

## 2022-01-04 NOTE — PROGRESS NOTE ADULT - SUBJECTIVE AND OBJECTIVE BOX
PULMONARY PROGRESS NOTE    DAMARI GUERRERO  MRN-[id number]    Patient is a 71y old  Female who presents with a chief complaint of dizziness and sOB (02 Jan 2022 10:47)      HPI:  still wheezing and coughing        MEDICATIONS  (STANDING):  benzonatate 100 milliGRAM(s) Oral every 8 hours  budesonide 160 MICROgram(s)/formoterol 4.5 MICROgram(s) Inhaler 2 Puff(s) Inhalation two times a day  buMETAnide 2 milliGRAM(s) Oral daily  buMETAnide 2 milliGRAM(s) Oral once  ferrous    sulfate 325 milliGRAM(s) Oral daily  heparin   Injectable 5000 Unit(s) SubCutaneous every 8 hours  hydrALAZINE 25 milliGRAM(s) Oral every 8 hours  lidocaine   4% Patch 1 Patch Transdermal every 24 hours  methylPREDNISolone sodium succinate Injectable 40 milliGRAM(s) IV Push every 8 hours  metoprolol succinate ER 25 milliGRAM(s) Oral daily  pantoprazole    Tablet 40 milliGRAM(s) Oral before breakfast  simvastatin 10 milliGRAM(s) Oral at bedtime    MEDICATIONS  (PRN):  acetaminophen     Tablet .. 650 milliGRAM(s) Oral every 6 hours PRN Moderate Pain (4 - 6)  ALBUTerol    90 MICROgram(s) HFA Inhaler 2 Puff(s) Inhalation every 6 hours PRN Shortness of Breath and/or Wheezing  aluminum hydroxide/magnesium hydroxide/simethicone Suspension 30 milliLiter(s) Oral every 4 hours PRN Dyspepsia  oxymetazoline 0.05% Nasal Spray 2 Spray(s) Nasal every 12 hours PRN Epistaxis  sodium chloride 0.65% Nasal 1 Spray(s) Both Nostrils every 8 hours PRN Nasal Congestion          EXAM:  Vital Signs Last 24 Hrs  T(C): 36.7 (01-03-22 @ 13:40), Max: 36.8 (01-03-22 @ 05:58)  T(F): 98.1 (01-03-22 @ 13:40), Max: 98.2 (01-03-22 @ 05:58)  HR: 68 (01-03-22 @ 13:40) (68 - 72)  BP: 126/69 (01-03-22 @ 13:40) (126/69 - 156/78)  BP(mean): --  RR: 18 (01-03-22 @ 13:40) (18 - 18)  SpO2: 98% (01-03-22 @ 13:40) (96% - 99%)        GENERAL: The patient is awake and alert no distress  LUNGS: bilat wheeze                                       9.0    7.29  )-----------( 316      ( 04 Jan 2022 06:56 )             32.9   01-04    134<L>  |  95<L>  |  98<H>  ----------------------------<  171<H>  5.4<H>   |  24  |  1.81<H>    Ca    9.8      04 Jan 2022 06:56  Phos  4.9     01-04  Mg     2.40     01-04          < from: Xray Chest 1 View AP/PA (12.30.21 @ 16:46) >  ACC: [id number] EXAM:  XR CHEST AP OR PA 1V                          PROCEDURE DATE:  12/30/2021          INTERPRETATION:  Chest one view    HISTORY: Shortness of breath    COMPARISON STUDY: 12/23/2021    Frontal expiratory view of the chest shows the heart to be similarly   enlarged in size. Sternal wires and cardiac valve ring are again noted.    The lungs show increased pulmonary congestion with small pleural   effusions and there is no evidence of pneumothorax.    IMPRESSION:  Increased congestion.        Thank you for the courtesy of this referral.    --- End of Report ---    < end of copied text >    PROBLEM LIST:  71y Female with HEALTH ISSUES - PROBLEM Dx:  Chronic obstructive pulmonary disease, unspecified COPD type    Hypertension    CAD (coronary artery disease)    Prophylactic measure    Edema    High creatinine    Acute on chronic diastolic congestive heart failure      RECS:    -cont steroids and supportive care for viral infection  -Patient with chronic hypercarbic respiratory failure secondary to COPD/obesity, she would benefit from noninvasive ventilation QHS/during sleep while home to avoid further episodes of respiratory failure and rehospitalization.  -supplemental o2,wean as tolerated, do not aim for sats of 100%  -incentive jovanny        Em Merida MD  [phone]

## 2022-01-04 NOTE — PROGRESS NOTE ADULT - SUBJECTIVE AND OBJECTIVE BOX
[facility] NEPHROLOGY- PROGRESS NOTE    71y Female with history of COPD, CHF presents with dizziness. Nephrology consulted for elevated Scr.    REVIEW OF SYSTEMS:  Gen: no changes in weight  Cards: no chest pain  Resp: + dyspnea with exertion improving  GI: no nausea or vomiting or diarrhea  Vascular: + LE edema improving    No Known Allergies      Hospital Medications: Medications reviewed      VITALS:  T(F): 98.3 (01-04-22 @ 08:00), Max: 98.3 (01-04-22 @ 08:00)  HR: 78 (01-04-22 @ 11:15)  BP: 132/61 (01-04-22 @ 09:15)  RR: 18 (01-04-22 @ 09:15)  SpO2: 96% (01-04-22 @ 11:15)  Wt(kg): --        PHYSICAL EXAM:    Gen: NAD, calm  Cards: RRR, +S1/S2, no M/G/R  Resp: scattered wheezing  GI: soft, NT/ND, NABS  Vascular: trace LE edema B/L          LABS:  01-04    134<L>  |  95<L>  |  98<H>  ----------------------------<  171<H>  5.4<H>   |  24  |  1.81<H>    Ca    9.8      04 Jan 2022 06:56  Phos  4.9     01-04  Mg     2.40     01-04      Creatinine Trend: 1.81 <--, 1.82 <--, 1.83 <--, 1.86 <--, 1.80 <--, 1.79 <--                        9.0    7.29  )-----------( 316      ( 04 Jan 2022 06:56 )             32.9     Urine Studies:          < from: Xray Chest 1 View- PORTABLE-Urgent (Xray Chest 1 View- PORTABLE-Urgent .) (01.02.22 @ 13:17) >  IMPRESSION: Moderate pulmonary vascular congestion, improved..    < end of copied text >

## 2022-01-04 NOTE — PROGRESS NOTE ADULT - SUBJECTIVE AND OBJECTIVE BOX
Jonas Frias MD  Interventional Cardiology / Advance Heart Failure and Cardiac Transplant Specialist  [place] Office : [address] [place], [place] [address]. [address]  Tel:   [place] Office : [address] [facility] [place] [address]  Tel: [phone]  Cell : [phone]    Subjective/Overnight events: Pt is lying in bed comfortable not in distress  	  MEDICATIONS:  buMETAnide 2 milliGRAM(s) Oral once  buMETAnide 2 milliGRAM(s) Oral daily  heparin   Injectable 5000 Unit(s) SubCutaneous every 8 hours  hydrALAZINE 25 milliGRAM(s) Oral every 8 hours  metoprolol succinate ER 25 milliGRAM(s) Oral daily      ALBUTerol    90 MICROgram(s) HFA Inhaler 2 Puff(s) Inhalation every 6 hours PRN  benzonatate 100 milliGRAM(s) Oral every 8 hours  budesonide 160 MICROgram(s)/formoterol 4.5 MICROgram(s) Inhaler 2 Puff(s) Inhalation two times a day    acetaminophen     Tablet .. 650 milliGRAM(s) Oral every 6 hours PRN    aluminum hydroxide/magnesium hydroxide/simethicone Suspension 30 milliLiter(s) Oral every 4 hours PRN  pantoprazole    Tablet 40 milliGRAM(s) Oral before breakfast    methylPREDNISolone sodium succinate Injectable 40 milliGRAM(s) IV Push every 8 hours  simvastatin 10 milliGRAM(s) Oral at bedtime    ferrous    sulfate 325 milliGRAM(s) Oral daily  lidocaine   4% Patch 1 Patch Transdermal every 24 hours  oxymetazoline 0.05% Nasal Spray 2 Spray(s) Nasal every 12 hours PRN  sodium chloride 0.65% Nasal 1 Spray(s) Both Nostrils every 8 hours PRN      PAST MEDICAL/SURGICAL HISTORY  PAST MEDICAL & SURGICAL HISTORY:  Atrial fibrillation  S/P Ablation    Pulmonary hypertension    MIGUEL (obstructive sleep apnea)    COPD (chronic obstructive pulmonary disease)  on home O2    CHF (congestive heart failure)    HTN (hypertension)    Gout    Mitral valve replaced    Tricuspid valve replaced    CHF (congestive heart failure)    Hypertension    COPD (chronic obstructive pulmonary disease)    History of mitral valve replacement with mechanical valve    Tricuspid valve replaced  mechanical    No significant past surgical history        SOCIAL HISTORY: Substance Use (street drugs): ( x ) never used  (  ) other:    FAMILY HISTORY:  Family history of hypertension (Father, Sibling)    Family history of stroke    Family history of hypertension (Mother)            PHYSICAL EXAM:  T(C): 36.8 (01-04-22 @ 08:00), Max: 36.8 (01-04-22 @ 08:00)  HR: 78 (01-04-22 @ 11:15) (60 - 85)  BP: 132/61 (01-04-22 @ 09:15) (126/72 - 147/63)  RR: 18 (01-04-22 @ 09:15) (18 - 18)  SpO2: 96% (01-04-22 @ 11:15) (95% - 100%)  Wt(kg): --  I&O's Summary        EYES:   PERRLA   ENMT:   Moist mucous membranes, Good dentition, No lesions  Cardiovascular: Normal S1 S2, No JVD, No murmurs, No edema  Respiratory: b/l wheezing  Gastrointestinal:  Soft, Non-tender, + BS	  Extremities: 1+ edema                                  9.0    7.29  )-----------( 316      ( 04 Jan 2022 06:56 )             32.9     01-04    134<L>  |  95<L>  |  98<H>  ----------------------------<  171<H>  5.4<H>   |  24  |  1.81<H>    Ca    9.8      04 Jan 2022 06:56  Phos  4.9     01-04  Mg     2.40     01-04      proBNP:   Lipid Profile:   HgA1c:   TSH:     Consultant(s) Notes Reviewed:  [x ] YES  [ ] NO    Care Discussed with Consultants/Other Providers [ x] YES  [ ] NO    Imaging Personally Reviewed independently:  [x] YES  [ ] NO    All labs, radiologic studies, vitals, orders and medications list reviewed. Patient is seen and examined at bedside. Case discussed with medical team.

## 2022-01-04 NOTE — PROGRESS NOTE ADULT - ASSESSMENT
71 year old hx of HTN, dCHF, MV and TV replacement, MIGUEL not on CPAP due to unable to afford, afib s/p ablation not on AC due to chronic bleeding, unable to do c-scope or endoscopy due to medical conditions, COPD on 3 L NC, here for dizziness. x1 week, feeling generally weakness .    EKG - NSR RBBB  Tele - episodes of Afib   Echo in 9/21 shows normal LV s/p MVR     1) Acute on chronic diastolic CHF   - bumex 2mg po daily   - echo in 9/21 shows normal LV s/p MVR  - c/w bumex  - monitor urine out put, daily weights    2) COPD   - on O2   - c/w steroids still wheezing   - f/u pulm     3) Afib  - not on AC, pt doesn't remember why , as per chart sec to GI bleed  - cont toprol     4) MISAEL  -f/u renal recs    5) DVT prophylaxis   - on sc heparin

## 2022-01-04 NOTE — PROGRESS NOTE ADULT - ASSESSMENT
72 yo F with CHF, MV/TV replacement, MIGUEL, initially with dizziness, weakness  No fever, no leukocytosis  Suspected viral URI  RVP human Metapneumovirus  On treatment for COPD/CHF  Overall,  1) New Viral URI  - RVP+ hMPV  - Supportive care  - Contact precautions per protocol  - Monitor for signs secondary bacterial pneumonia (monitor off abx for now)  2) SOB  - Follow up pulmonary--CHF/COPD  3) CKD  - Trend Cr to baseline    Thee Gupta MD  Pager [phone]  From 5pm-9am, and on weekends call [phone]

## 2022-01-04 NOTE — PROGRESS NOTE ADULT - SUBJECTIVE AND OBJECTIVE BOX
SUBJECTIVE / OVERNIGHT EVENTS: pt seen and examined    MEDICATIONS  (STANDING):  benzonatate 100 milliGRAM(s) Oral every 8 hours  budesonide 160 MICROgram(s)/formoterol 4.5 MICROgram(s) Inhaler 2 Puff(s) Inhalation two times a day  buMETAnide 2 milliGRAM(s) Oral daily  ferrous    sulfate 325 milliGRAM(s) Oral daily  heparin   Injectable 5000 Unit(s) SubCutaneous every 8 hours  hydrALAZINE 25 milliGRAM(s) Oral every 8 hours  lidocaine   4% Patch 1 Patch Transdermal every 24 hours  methylPREDNISolone sodium succinate Injectable 40 milliGRAM(s) IV Push every 8 hours  metoprolol succinate ER 25 milliGRAM(s) Oral daily  pantoprazole    Tablet 40 milliGRAM(s) Oral before breakfast  simvastatin 10 milliGRAM(s) Oral at bedtime    MEDICATIONS  (PRN):  acetaminophen     Tablet .. 650 milliGRAM(s) Oral every 6 hours PRN Moderate Pain (4 - 6)  ALBUTerol    90 MICROgram(s) HFA Inhaler 2 Puff(s) Inhalation every 6 hours PRN Shortness of Breath and/or Wheezing  aluminum hydroxide/magnesium hydroxide/simethicone Suspension 30 milliLiter(s) Oral every 4 hours PRN Dyspepsia  oxymetazoline 0.05% Nasal Spray 2 Spray(s) Nasal every 12 hours PRN Epistaxis  sodium chloride 0.65% Nasal 1 Spray(s) Both Nostrils every 8 hours PRN Nasal Congestion    Vital Signs Last 24 Hrs  T(C): 36.8 (01-04-22 @ 08:00), Max: 36.8 (01-04-22 @ 08:00)  T(F): 98.3 (01-04-22 @ 08:00), Max: 98.3 (01-04-22 @ 08:00)  HR: 68 (01-04-22 @ 18:00) (60 - 85)  BP: 130/69 (01-04-22 @ 18:00) (126/72 - 147/63)  BP(mean): --  RR: 18 (01-04-22 @ 18:00) (18 - 18)  SpO2: 98% (01-04-22 @ 18:00) (95% - 100%)          Constitutional: No fever, fatigue  Skin: No rash.  Eyes: No recent vision problems or eye pain.  ENT: No congestion, ear pain, or sore throat.  Cardiovascular: No chest pain or palpation.  Respiratory: No cough, shortness of breath, congestion, or wheezing.  Gastrointestinal: No abdominal pain, nausea, vomiting, or diarrhea.  Genitourinary: No dysuria.  Musculoskeletal: No joint swelling.  Neurologic: No headache.    PHYSICAL EXAM:  GENERAL: NAD  EYES: EOMI, PERRLA  NECK: Supple, No JVD  CHEST/LUNG: dec breath sounds at bases, fine exp wheezing+  HEART:  S1 , S2 +  ABDOMEN: soft , bs+  EXTREMITIES: + edema   NEUROLOGY:alert awake    LABS:  01-04    134<L>  |  95<L>  |  98<H>  ----------------------------<  171<H>  5.4<H>   |  24  |  1.81<H>    Ca    9.8      04 Jan 2022 06:56  Phos  4.9     01-04  Mg     2.40     01-04      Creatinine Trend: 1.81 <--, 1.82 <--, 1.83 <--, 1.86 <--, 1.80 <--, 1.79 <--                        9.0    7.29  )-----------( 316      ( 04 Jan 2022 06:56 )             32.9     Urine Studies:          ABG - ( 04 Jan 2022 12:08 )  pH, Arterial: 7.25  pH, Blood: x     /  pCO2: 76    /  pO2: 64    / HCO3: 33    / Base Excess: 3.6   /  SaO2: 91.9                        Creatinine, Random Urine: 153 mg/dL (12-26 @ 16:17)

## 2022-01-05 LAB
ANION GAP SERPL CALC-SCNC: 9 MMOL/L — SIGNIFICANT CHANGE UP (ref 7–14)
BUN SERPL-MCNC: 110 MG/DL — HIGH (ref 7–23)
CALCIUM SERPL-MCNC: 9.9 MG/DL — SIGNIFICANT CHANGE UP (ref 8.4–10.5)
CHLORIDE SERPL-SCNC: 98 MMOL/L — SIGNIFICANT CHANGE UP (ref 98–107)
CO2 SERPL-SCNC: 31 MMOL/L — SIGNIFICANT CHANGE UP (ref 22–31)
CREAT SERPL-MCNC: 2.34 MG/DL — HIGH (ref 0.5–1.3)
GLUCOSE SERPL-MCNC: 166 MG/DL — HIGH (ref 70–99)
HCT VFR BLD CALC: 32.3 % — LOW (ref 34.5–45)
HGB BLD-MCNC: 9.1 G/DL — LOW (ref 11.5–15.5)
MAGNESIUM SERPL-MCNC: 2.4 MG/DL — SIGNIFICANT CHANGE UP (ref 1.6–2.6)
MCHC RBC-ENTMCNC: 27.4 PG — SIGNIFICANT CHANGE UP (ref 27–34)
MCHC RBC-ENTMCNC: 28.2 GM/DL — LOW (ref 32–36)
MCV RBC AUTO: 97.3 FL — SIGNIFICANT CHANGE UP (ref 80–100)
NRBC # BLD: 0 /100 WBCS — SIGNIFICANT CHANGE UP
NRBC # FLD: 0.03 K/UL — HIGH
PHOSPHATE SERPL-MCNC: 4.4 MG/DL — SIGNIFICANT CHANGE UP (ref 2.5–4.5)
PLATELET # BLD AUTO: 303 K/UL — SIGNIFICANT CHANGE UP (ref 150–400)
POTASSIUM SERPL-MCNC: 4.6 MMOL/L — SIGNIFICANT CHANGE UP (ref 3.5–5.3)
POTASSIUM SERPL-SCNC: 4.6 MMOL/L — SIGNIFICANT CHANGE UP (ref 3.5–5.3)
RBC # BLD: 3.32 M/UL — LOW (ref 3.8–5.2)
RBC # FLD: 17.9 % — HIGH (ref 10.3–14.5)
SODIUM SERPL-SCNC: 138 MMOL/L — SIGNIFICANT CHANGE UP (ref 135–145)
WBC # BLD: 10.86 K/UL — HIGH (ref 3.8–10.5)
WBC # FLD AUTO: 10.86 K/UL — HIGH (ref 3.8–10.5)

## 2022-01-05 PROCEDURE — 99232 SBSQ HOSP IP/OBS MODERATE 35: CPT

## 2022-01-05 PROCEDURE — 99233 SBSQ HOSP IP/OBS HIGH 50: CPT

## 2022-01-05 RX ORDER — IPRATROPIUM/ALBUTEROL SULFATE 18-103MCG
3 AEROSOL WITH ADAPTER (GRAM) INHALATION EVERY 6 HOURS
Refills: 0 | Status: DISCONTINUED | OUTPATIENT
Start: 2022-01-05 | End: 2022-01-14

## 2022-01-05 RX ADMIN — HEPARIN SODIUM 5000 UNIT(S): 5000 INJECTION INTRAVENOUS; SUBCUTANEOUS at 23:14

## 2022-01-05 RX ADMIN — Medication 100 MILLIGRAM(S): at 06:23

## 2022-01-05 RX ADMIN — Medication 3 MILLILITER(S): at 16:08

## 2022-01-05 RX ADMIN — HEPARIN SODIUM 5000 UNIT(S): 5000 INJECTION INTRAVENOUS; SUBCUTANEOUS at 13:03

## 2022-01-05 RX ADMIN — Medication 3 MILLILITER(S): at 21:13

## 2022-01-05 RX ADMIN — BUDESONIDE AND FORMOTEROL FUMARATE DIHYDRATE 2 PUFF(S): 160; 4.5 AEROSOL RESPIRATORY (INHALATION) at 10:23

## 2022-01-05 RX ADMIN — SIMVASTATIN 10 MILLIGRAM(S): 20 TABLET, FILM COATED ORAL at 23:15

## 2022-01-05 RX ADMIN — Medication 100 MILLIGRAM(S): at 23:14

## 2022-01-05 RX ADMIN — Medication 100 MILLIGRAM(S): at 13:03

## 2022-01-05 RX ADMIN — Medication 40 MILLIGRAM(S): at 18:32

## 2022-01-05 RX ADMIN — Medication 40 MILLIGRAM(S): at 06:23

## 2022-01-05 RX ADMIN — BUDESONIDE AND FORMOTEROL FUMARATE DIHYDRATE 2 PUFF(S): 160; 4.5 AEROSOL RESPIRATORY (INHALATION) at 23:14

## 2022-01-05 RX ADMIN — Medication 25 MILLIGRAM(S): at 13:02

## 2022-01-05 RX ADMIN — Medication 25 MILLIGRAM(S): at 23:14

## 2022-01-05 RX ADMIN — Medication 25 MILLIGRAM(S): at 06:24

## 2022-01-05 RX ADMIN — Medication 25 MILLIGRAM(S): at 10:23

## 2022-01-05 RX ADMIN — PANTOPRAZOLE SODIUM 40 MILLIGRAM(S): 20 TABLET, DELAYED RELEASE ORAL at 06:23

## 2022-01-05 RX ADMIN — Medication 325 MILLIGRAM(S): at 13:02

## 2022-01-05 RX ADMIN — HEPARIN SODIUM 5000 UNIT(S): 5000 INJECTION INTRAVENOUS; SUBCUTANEOUS at 06:24

## 2022-01-05 NOTE — PROGRESS NOTE ADULT - ASSESSMENT
71 year old hx of HTN, dCHF, MV and TV replacement, MIGUEL not on CPAP due to unable to afford, afib s/p ablation not on AC due to chronic bleeding, unable to do c-scope or endoscopy due to medical conditions, COPD on 3 L NC, here for dizziness. x1 week, feeling generally weakness .    EKG - NSR RBBB  Tele - episodes of Afib   Echo in 9/21 shows normal LV s/p MVR     1) Acute on chronic diastolic CHF   - echo in 9/21 shows normal LV s/p MVR  - monitor urine out put, daily weights  -creat worsened, bumex hled      2) COPD   - on O2   - c/w steroids still wheezing   - f/u pulm     3) Afib  - not on AC, pt doesn't remember why , as per chart sec to GI bleed  - cont toprol     4) MISAEL  -f/u renal recs    5) DVT prophylaxis   - on sc heparin

## 2022-01-05 NOTE — PROGRESS NOTE ADULT - SUBJECTIVE AND OBJECTIVE BOX
Jonas Frias MD  Interventional Cardiology / Advance Heart Failure and Cardiac Transplant Specialist  [place] Office : [phone] [place], [place] [address]  Tel:   [place] Office : [address] [facility] [place] [address]  Tel: [phone]  Cell : [phone]    Subjective/Overnight events: Pt is lying in bed comfortable not in distress  	  MEDICATIONS:  heparin   Injectable 5000 Unit(s) SubCutaneous every 8 hours  hydrALAZINE 25 milliGRAM(s) Oral every 8 hours  metoprolol succinate ER 25 milliGRAM(s) Oral daily      albuterol/ipratropium for Nebulization 3 milliLiter(s) Nebulizer every 6 hours  benzonatate 100 milliGRAM(s) Oral every 8 hours  budesonide 160 MICROgram(s)/formoterol 4.5 MICROgram(s) Inhaler 2 Puff(s) Inhalation two times a day    acetaminophen     Tablet .. 650 milliGRAM(s) Oral every 6 hours PRN    aluminum hydroxide/magnesium hydroxide/simethicone Suspension 30 milliLiter(s) Oral every 4 hours PRN  pantoprazole    Tablet 40 milliGRAM(s) Oral before breakfast    methylPREDNISolone sodium succinate Injectable 40 milliGRAM(s) IV Push every 8 hours  simvastatin 10 milliGRAM(s) Oral at bedtime    ferrous    sulfate 325 milliGRAM(s) Oral daily  lidocaine   4% Patch 1 Patch Transdermal every 24 hours  oxymetazoline 0.05% Nasal Spray 2 Spray(s) Nasal every 12 hours PRN  sodium chloride 0.65% Nasal 1 Spray(s) Both Nostrils every 8 hours PRN      PAST MEDICAL/SURGICAL HISTORY  PAST MEDICAL & SURGICAL HISTORY:  Atrial fibrillation  S/P Ablation    Pulmonary hypertension    MIGUEL (obstructive sleep apnea)    COPD (chronic obstructive pulmonary disease)  on home O2    CHF (congestive heart failure)    HTN (hypertension)    Gout    Mitral valve replaced    Tricuspid valve replaced    CHF (congestive heart failure)    Hypertension    COPD (chronic obstructive pulmonary disease)    History of mitral valve replacement with mechanical valve    Tricuspid valve replaced  mechanical    No significant past surgical history        SOCIAL HISTORY: Substance Use (street drugs): ( x ) never used  (  ) other:    FAMILY HISTORY:  Family history of hypertension (Father, Sibling)    Family history of stroke    Family history of hypertension (Mother)          PHYSICAL EXAM:  T(C): 36.8 (01-05-22 @ 10:19), Max: 36.8 (01-05-22 @ 10:19)  HR: 68 (01-05-22 @ 12:57) (64 - 75)  BP: 151/81 (01-05-22 @ 12:57) (130/69 - 155/81)  RR: 20 (01-05-22 @ 12:57) (18 - 20)  SpO2: 98% (01-05-22 @ 12:57) (96% - 99%)  Wt(kg): --  I&O's Summary    05 Jan 2022 07:01  -  05 Jan 2022 12:59  --------------------------------------------------------  IN: 240 mL / OUT: 800 mL / NET: -560 mL          EYES:   PERRLA   ENMT:   Moist mucous membranes, Good dentition, No lesions  Cardiovascular: Normal S1 S2, No JVD, No murmurs, No edema  Respiratory: b/l wheezing  Gastrointestinal:  Soft, Non-tender, + BS	  Extremities: 1+ edema                                9.1    10.86 )-----------( 303      ( 05 Jan 2022 07:19 )             32.3     01-05    138  |  98  |  110<H>  ----------------------------<  166<H>  4.6   |  31  |  2.34<H>    Ca    9.9      05 Jan 2022 07:19  Phos  4.4     01-05  Mg     2.40     01-05      proBNP:   Lipid Profile:   HgA1c:   TSH:     Consultant(s) Notes Reviewed:  [x ] YES  [ ] NO    Care Discussed with Consultants/Other Providers [ x] YES  [ ] NO    Imaging Personally Reviewed independently:  [x] YES  [ ] NO    All labs, radiologic studies, vitals, orders and medications list reviewed. Patient is seen and examined at bedside. Case discussed with medical team.

## 2022-01-05 NOTE — PROGRESS NOTE ADULT - SUBJECTIVE AND OBJECTIVE BOX
CC: F/U for Viral URI    Saw/spoke to patient. No fevers, no chills. No new complaints.    Allergies  No Known Allergies    ANTIMICROBIALS:  Off    PE:    Vital Signs Last 24 Hrs  T(C): 36.8 (05 Jan 2022 10:19), Max: 36.8 (05 Jan 2022 10:19)  T(F): 98.3 (05 Jan 2022 10:19), Max: 98.3 (05 Jan 2022 10:19)  HR: 68 (05 Jan 2022 12:57) (68 - 75)  BP: 151/81 (05 Jan 2022 12:57) (130/69 - 155/81)  RR: 20 (05 Jan 2022 12:57) (18 - 20)  SpO2: 98% (05 Jan 2022 12:57) (96% - 99%)    Gen: AOx3, NAD, non-toxic  CV: S1+S2 normal, nontachycardic  Resp: Clear bilat, no resp distress, no crackles/wheezes, bipap  Abd: Soft, nontender, +BS  Ext: No LE edema, no wounds    LABS:                        9.1    10.86 )-----------( 303      ( 05 Jan 2022 07:19 )             32.3     01-05    138  |  98  |  110<H>  ----------------------------<  166<H>  4.6   |  31  |  2.34<H>    Ca    9.9      05 Jan 2022 07:19  Phos  4.4     01-05  Mg     2.40     01-05    MICROBIOLOGY:    Rapid RVP Result: Detected (01-02 @ 17:52) hMPV    RADIOLOGY:    1/2 XR:    FINDINGS: The heart size cannot be adequately assessed on this single   view. The patient is status post median sternotomy and valve repair.   There is moderate pulmonary vascular congestion, improved. The patient's   chin obscures the bilateral lung apices.    IMPRESSION: Moderate pulmonary vascular congestion, improved..

## 2022-01-05 NOTE — PROGRESS NOTE ADULT - SUBJECTIVE AND OBJECTIVE BOX
PULMONARY PROGRESS NOTE    DAMARI GUERRERO  MRN-[id number]    Patient is a 71y old  Female who presents with a chief complaint of dizziness and sOB (02 Jan 2022 10:47)      HPI:  not feeling well  asked to go back on bipap  still wheezing      MEDICATIONS  (STANDING):  albuterol/ipratropium for Nebulization 3 milliLiter(s) Nebulizer every 6 hours  benzonatate 100 milliGRAM(s) Oral every 8 hours  budesonide 160 MICROgram(s)/formoterol 4.5 MICROgram(s) Inhaler 2 Puff(s) Inhalation two times a day  ferrous    sulfate 325 milliGRAM(s) Oral daily  heparin   Injectable 5000 Unit(s) SubCutaneous every 8 hours  hydrALAZINE 25 milliGRAM(s) Oral every 8 hours  lidocaine   4% Patch 1 Patch Transdermal every 24 hours  methylPREDNISolone sodium succinate Injectable 40 milliGRAM(s) IV Push every 8 hours  metoprolol succinate ER 25 milliGRAM(s) Oral daily  pantoprazole    Tablet 40 milliGRAM(s) Oral before breakfast  simvastatin 10 milliGRAM(s) Oral at bedtime    MEDICATIONS  (PRN):  acetaminophen     Tablet .. 650 milliGRAM(s) Oral every 6 hours PRN Moderate Pain (4 - 6)  aluminum hydroxide/magnesium hydroxide/simethicone Suspension 30 milliLiter(s) Oral every 4 hours PRN Dyspepsia  oxymetazoline 0.05% Nasal Spray 2 Spray(s) Nasal every 12 hours PRN Epistaxis  sodium chloride 0.65% Nasal 1 Spray(s) Both Nostrils every 8 hours PRN Nasal Congestion            EXAM:  Vital Signs Last 24 Hrs  T(C): 36.8 (05 Jan 2022 10:19), Max: 36.8 (05 Jan 2022 10:19)  T(F): 98.3 (05 Jan 2022 10:19), Max: 98.3 (05 Jan 2022 10:19)  HR: 72 (05 Jan 2022 10:19) (64 - 75)  BP: 155/81 (05 Jan 2022 10:19) (130/69 - 155/81)  BP(mean): --  RR: 18 (05 Jan 2022 10:19) (18 - 20)  SpO2: 98% (05 Jan 2022 10:19) (96% - 99%)    GENERAL: The patient is awake and alert no distress  LUNGS: bilat wheeze                                       9.1    10.86 )-----------( 303      ( 05 Jan 2022 07:19 )             32.3   01-05    138  |  98  |  110<H>  ----------------------------<  166<H>  4.6   |  31  |  2.34<H>    Ca    9.9      05 Jan 2022 07:19  Phos  4.4     01-05  Mg     2.40     01-05      < from: Xray Chest 1 View- PORTABLE-Urgent (Xray Chest 1 View- PORTABLE-Urgent .) (01.02.22 @ 13:17) >  ACC: [id number] EXAM:  XR CHEST PORTABLE URGENT 1V                          PROCEDURE DATE:  01/02/2022          INTERPRETATION:  HISTORY: Shortness of breath    TECHNIQUE: A single AP view of the chest was obtained.    COMPARISON: 12/30/2021    FINDINGS: The heart size cannot be adequately assessed on this single   view. The patient is status post median sternotomy and valve repair.   There is moderate pulmonary vascular congestion, improved. The patient's   chin obscures the bilateral lung apices.    IMPRESSION: Moderate pulmonary vascular congestion, improved..    --- End of Report ---    < end of copied text >        < from: Xray Chest 1 View AP/PA (12.30.21 @ 16:46) >  ACC: [id number] EXAM:  XR CHEST AP OR PA 1V                          PROCEDURE DATE:  12/30/2021          INTERPRETATION:  Chest one view    HISTORY: Shortness of breath    COMPARISON STUDY: 12/23/2021    Frontal expiratory view of the chest shows the heart to be similarly   enlarged in size. Sternal wires and cardiac valve ring are again noted.    The lungs show increased pulmonary congestion with small pleural   effusions and there is no evidence of pneumothorax.    IMPRESSION:  Increased congestion.        Thank you for the courtesy of this referral.    --- End of Report ---    < end of copied text >    PROBLEM LIST:  71y Female with HEALTH ISSUES - PROBLEM Dx:  Chronic obstructive pulmonary disease, unspecified COPD type    Hypertension    CAD (coronary artery disease)    Prophylactic measure    Edema    High creatinine    Acute on chronic diastolic congestive heart failure      RECS:    -placed back on bipap  -added nebs atc  -cont steroids and supportive care for viral infection  -Patient with chronic hypercarbic respiratory failure secondary to COPD/obesity, she would benefit from noninvasive ventilation QHS/during sleep while home to avoid further episodes of respiratory failure and rehospitalization.  -supplemental o2,wean as tolerated, do not aim for sats of 100%  -incentive jovanny        Em Merida MD  [phone]

## 2022-01-05 NOTE — PROVIDER CONTACT NOTE (OTHER) - RECOMMENDATIONS
Hold BP meds for now according to Parameter. Reassess patient and notify of changes
 was at beside and stated to give Pt prn albuterol
Patient monitored on tele, ACP made aware, recommended EKG

## 2022-01-05 NOTE — PROGRESS NOTE ADULT - SUBJECTIVE AND OBJECTIVE BOX
[facility] NEPHROLOGY- PROGRESS NOTE    71y Female with history of COPD, CHF presents with dizziness. Nephrology consulted for elevated Scr.    REVIEW OF SYSTEMS:  Gen: no changes in weight  Cards: no chest pain  Resp: + dyspnea  GI: no nausea or vomiting or diarrhea  Vascular: + LE edema improving    No Known Allergies      Hospital Medications: Medications reviewed      VITALS:  T(F): 98.3 (01-05-22 @ 10:19), Max: 98.3 (01-05-22 @ 10:19)  HR: 72 (01-05-22 @ 10:19)  BP: 155/81 (01-05-22 @ 10:19)  RR: 18 (01-05-22 @ 10:19)  SpO2: 98% (01-05-22 @ 10:19)  Wt(kg): --    01-05 @ 07:01  -  01-05 @ 12:05  --------------------------------------------------------  IN: 240 mL / OUT: 800 mL / NET: -560 mL      PHYSICAL EXAM:    Gen: NAD, calm  Cards: RRR, +S1/S2, no M/G/R  Resp: scattered wheezing, poor airway entry bilaterally, + tachypnea  GI: soft, NT/ND, NABS  Vascular: trace LE edema B/L          LABS:  01-05    138  |  98  |  110<H>  ----------------------------<  166<H>  4.6   |  31  |  2.34<H>    Ca    9.9      05 Jan 2022 07:19  Phos  4.4     01-05  Mg     2.40     01-05      Creatinine Trend: 2.34 <--, 1.81 <--, 1.82 <--, 1.83 <--, 1.86 <--, 1.80 <--                        9.1    10.86 )-----------( 303      ( 05 Jan 2022 07:19 )             32.3     Urine Studies:

## 2022-01-05 NOTE — PROGRESS NOTE ADULT - ASSESSMENT
72 yo F with CHF, MV/TV replacement, MIGUEL, initially with dizziness, weakness  No fever, no leukocytosis  Suspected viral URI  RVP human Metapneumovirus  On treatment for COPD/CHF  Overall,  1) New Viral URI  - RVP+ hMPV  - Supportive care  - Contact precautions per protocol  - Monitor for signs secondary bacterial pneumonia (monitor off abx for now)  2) SOB  - Follow up pulmonary--CHF/COPD  - If on ongoing bipap--consider CT Chest to evaluate for alternate causes SOB; monitor CBCs for any leukocytosis (although note steroids)  3) CKD  - Trend Cr to baseline    Thee Gupta MD  Pager [phone]  From 5pm-9am, and on weekends call [phone]

## 2022-01-05 NOTE — PROGRESS NOTE ADULT - SUBJECTIVE AND OBJECTIVE BOX
SUBJECTIVE / OVERNIGHT EVENTS: pt seen and examined    MEDICATIONS  (STANDING):  albuterol/ipratropium for Nebulization 3 milliLiter(s) Nebulizer every 6 hours  benzonatate 100 milliGRAM(s) Oral every 8 hours  budesonide 160 MICROgram(s)/formoterol 4.5 MICROgram(s) Inhaler 2 Puff(s) Inhalation two times a day  ferrous    sulfate 325 milliGRAM(s) Oral daily  heparin   Injectable 5000 Unit(s) SubCutaneous every 8 hours  hydrALAZINE 25 milliGRAM(s) Oral every 8 hours  lidocaine   4% Patch 1 Patch Transdermal every 24 hours  methylPREDNISolone sodium succinate Injectable 40 milliGRAM(s) IV Push every 8 hours  metoprolol succinate ER 25 milliGRAM(s) Oral daily  pantoprazole    Tablet 40 milliGRAM(s) Oral before breakfast  simvastatin 10 milliGRAM(s) Oral at bedtime    MEDICATIONS  (PRN):  acetaminophen     Tablet .. 650 milliGRAM(s) Oral every 6 hours PRN Moderate Pain (4 - 6)  aluminum hydroxide/magnesium hydroxide/simethicone Suspension 30 milliLiter(s) Oral every 4 hours PRN Dyspepsia  oxymetazoline 0.05% Nasal Spray 2 Spray(s) Nasal every 12 hours PRN Epistaxis  sodium chloride 0.65% Nasal 1 Spray(s) Both Nostrils every 8 hours PRN Nasal Congestion    Vital Signs Last 24 Hrs  T(C): 36.7 (01-05-22 @ 12:57), Max: 36.8 (01-05-22 @ 10:19)  T(F): 98.1 (01-05-22 @ 12:57), Max: 98.3 (01-05-22 @ 10:19)  HR: 66 (01-05-22 @ 16:23) (66 - 75)  BP: 151/81 (01-05-22 @ 12:57) (133/70 - 155/81)  BP(mean): --  RR: 20 (01-05-22 @ 12:57) (18 - 20)  SpO2: 98% (01-05-22 @ 16:23) (96% - 99%)        Constitutional: No fever, fatigue  Skin: No rash.  Eyes: No recent vision problems or eye pain.  ENT: No congestion, ear pain, or sore throat.  Cardiovascular: No chest pain or palpation.  Respiratory: No cough, shortness of breath, congestion, or wheezing.  Gastrointestinal: No abdominal pain, nausea, vomiting, or diarrhea.  Genitourinary: No dysuria.  Musculoskeletal: No joint swelling.  Neurologic: No headache.    PHYSICAL EXAM:  GENERAL: NAD  EYES: EOMI, PERRLA  NECK: Supple, No JVD  CHEST/LUNG: dec breath sounds at bases, fine exp wheezing+  HEART:  S1 , S2 +  ABDOMEN: soft , bs+  EXTREMITIES: + edema   NEUROLOGY:alert awake    LABS:  01-05    138  |  98  |  110<H>  ----------------------------<  166<H>  4.6   |  31  |  2.34<H>    Ca    9.9      05 Jan 2022 07:19  Phos  4.4     01-05  Mg     2.40     01-05      Creatinine Trend: 2.34 <--, 1.81 <--, 1.82 <--, 1.83 <--, 1.86 <--, 1.80 <--                        9.1    10.86 )-----------( 303      ( 05 Jan 2022 07:19 )             32.3     Urine Studies:          ABG - ( 04 Jan 2022 12:08 )  pH, Arterial: 7.25  pH, Blood: x     /  pCO2: 76    /  pO2: 64    / HCO3: 33    / Base Excess: 3.6   /  SaO2: 91.9                    ABG - ( 04 Jan 2022 12:08 )  pH, Arterial: 7.25  pH, Blood: x     /  pCO2: 76    /  pO2: 64    / HCO3: 33    / Base Excess: 3.6   /  SaO2: 91.9                        Creatinine, Random Urine: 153 mg/dL (12-26 @ 16:17)

## 2022-01-05 NOTE — CHART NOTE - NSCHARTNOTEFT_GEN_A_CORE
Nursing staff unable to get IV - No IV nurse on for US - Will give 1 dose Solu-Medrol 40mg IM for today and get IV in AM - Called pharmacy advised not for long-term and give in gluteal region - Dose is same

## 2022-01-05 NOTE — PROGRESS NOTE ADULT - ASSESSMENT
71y Female with history of COPD, CHF presents with dizziness. Nephrology consulted for elevated Scr.    1) MISAEL: secondary to HF and infection. Scr increased this morning due to additional dose of bumex on 1/4. Agree with holding bumex at this time (last dose this morning). UA active however contaminated and doubt patient with underlying GN. FeUrea low. Bladder scan negative. Avoid nephrotoxins.    2) CKD-3b: Baseline Scr 1.4-1.6. Outpatient CKD work up. Monitor electrolytes.    3) HTN with CKD: BP controlled. Continue with current medications. Monitor BP.    4) LE edema: Improving. Hold bumex 2 mg PO daily as above (last dose this morning). Elevated serum CO2 due to respiratory acidosis which is not compensated as per blood gas. Prior TTE with grossly normal LVSF. Monitor UO.    5) Anemia: Hb low with iron deficiency s/p venofer s/p Epo 10K X 1 dose on 12/30. Monitor Hb.    6) Hyperkalemia: Resolved. Continue with low K diet. Monitor serum K.      [facility] NEPHROLOGY  Rodrigue Amador M.D.  Rob Perez D.O.  Ana Song M.D.  Lucrecia López, MSN, ANP-C    Telephone: [phone]  Facsimile: [phone]    [address] [place] [address]

## 2022-01-05 NOTE — PROVIDER CONTACT NOTE (OTHER) - BACKGROUND
Dizzy, SOB   CHF exacerbation   3L NC, per patient this is common to have nosebleeds
Pt admitted for CHF. IV Lasix, entresto and coreg given this AM.  Pt seen by PT and stated feeling "whoozy" when standing. Orthostatics ordered.
Pt admitted for hyponatremia
Patient admitted for CHF, history of A.Fib.
Dx - Acute on chronic systolic CHF  Hx - COPD on home O2, CHF, mitral and tricuspid valve repair
Dx - Acute on chronic systolic CHF  Hx - COPD on home O2, CHF, mitral and tricuspid valve repair

## 2022-01-06 LAB
ANION GAP SERPL CALC-SCNC: 10 MMOL/L — SIGNIFICANT CHANGE UP (ref 7–14)
BASE EXCESS BLDV CALC-SCNC: 5.3 MMOL/L — HIGH (ref -2–3)
BUN SERPL-MCNC: 116 MG/DL — HIGH (ref 7–23)
CALCIUM SERPL-MCNC: 10.2 MG/DL — SIGNIFICANT CHANGE UP (ref 8.4–10.5)
CHLORIDE SERPL-SCNC: 99 MMOL/L — SIGNIFICANT CHANGE UP (ref 98–107)
CO2 BLDV-SCNC: 35.1 MMOL/L — HIGH (ref 22–26)
CO2 SERPL-SCNC: 30 MMOL/L — SIGNIFICANT CHANGE UP (ref 22–31)
CREAT SERPL-MCNC: 1.95 MG/DL — HIGH (ref 0.5–1.3)
GLUCOSE SERPL-MCNC: 162 MG/DL — HIGH (ref 70–99)
HCO3 BLDV-SCNC: 33 MMOL/L — HIGH (ref 22–29)
HCT VFR BLD CALC: 32.6 % — LOW (ref 34.5–45)
HGB BLD-MCNC: 9.4 G/DL — LOW (ref 11.5–15.5)
MAGNESIUM SERPL-MCNC: 2.5 MG/DL — SIGNIFICANT CHANGE UP (ref 1.6–2.6)
MCHC RBC-ENTMCNC: 27.8 PG — SIGNIFICANT CHANGE UP (ref 27–34)
MCHC RBC-ENTMCNC: 28.8 GM/DL — LOW (ref 32–36)
MCV RBC AUTO: 96.4 FL — SIGNIFICANT CHANGE UP (ref 80–100)
NRBC # BLD: 0 /100 WBCS — SIGNIFICANT CHANGE UP
NRBC # FLD: 0 K/UL — SIGNIFICANT CHANGE UP
PCO2 BLDV: 63 MMHG — HIGH (ref 39–42)
PH BLDV: 7.33 — SIGNIFICANT CHANGE UP (ref 7.32–7.43)
PHOSPHATE SERPL-MCNC: 4.4 MG/DL — SIGNIFICANT CHANGE UP (ref 2.5–4.5)
PLATELET # BLD AUTO: 295 K/UL — SIGNIFICANT CHANGE UP (ref 150–400)
PO2 BLDV: 172 MMHG — SIGNIFICANT CHANGE UP
POTASSIUM SERPL-MCNC: 4.7 MMOL/L — SIGNIFICANT CHANGE UP (ref 3.5–5.3)
POTASSIUM SERPL-SCNC: 4.7 MMOL/L — SIGNIFICANT CHANGE UP (ref 3.5–5.3)
RBC # BLD: 3.38 M/UL — LOW (ref 3.8–5.2)
RBC # FLD: 18.2 % — HIGH (ref 10.3–14.5)
SAO2 % BLDV: 100 % — SIGNIFICANT CHANGE UP
SODIUM SERPL-SCNC: 139 MMOL/L — SIGNIFICANT CHANGE UP (ref 135–145)
WBC # BLD: 7.52 K/UL — SIGNIFICANT CHANGE UP (ref 3.8–10.5)
WBC # FLD AUTO: 7.52 K/UL — SIGNIFICANT CHANGE UP (ref 3.8–10.5)

## 2022-01-06 PROCEDURE — 99232 SBSQ HOSP IP/OBS MODERATE 35: CPT

## 2022-01-06 PROCEDURE — 99233 SBSQ HOSP IP/OBS HIGH 50: CPT

## 2022-01-06 RX ORDER — ERYTHROPOIETIN 10000 [IU]/ML
10000 INJECTION, SOLUTION INTRAVENOUS; SUBCUTANEOUS ONCE
Refills: 0 | Status: DISCONTINUED | OUTPATIENT
Start: 2022-01-06 | End: 2022-01-07

## 2022-01-06 RX ORDER — BUDESONIDE, MICRONIZED 100 %
0.5 POWDER (GRAM) MISCELLANEOUS
Refills: 0 | Status: DISCONTINUED | OUTPATIENT
Start: 2022-01-06 | End: 2022-01-14

## 2022-01-06 RX ADMIN — Medication 25 MILLIGRAM(S): at 22:58

## 2022-01-06 RX ADMIN — Medication 40 MILLIGRAM(S): at 11:07

## 2022-01-06 RX ADMIN — Medication 3 MILLILITER(S): at 10:18

## 2022-01-06 RX ADMIN — Medication 100 MILLIGRAM(S): at 06:55

## 2022-01-06 RX ADMIN — SIMVASTATIN 10 MILLIGRAM(S): 20 TABLET, FILM COATED ORAL at 22:58

## 2022-01-06 RX ADMIN — Medication 325 MILLIGRAM(S): at 13:05

## 2022-01-06 RX ADMIN — LIDOCAINE 1 PATCH: 4 CREAM TOPICAL at 22:57

## 2022-01-06 RX ADMIN — PANTOPRAZOLE SODIUM 40 MILLIGRAM(S): 20 TABLET, DELAYED RELEASE ORAL at 06:56

## 2022-01-06 RX ADMIN — Medication 25 MILLIGRAM(S): at 14:39

## 2022-01-06 RX ADMIN — Medication 25 MILLIGRAM(S): at 06:57

## 2022-01-06 RX ADMIN — Medication 40 MILLIGRAM(S): at 03:00

## 2022-01-06 RX ADMIN — Medication 100 MILLIGRAM(S): at 14:38

## 2022-01-06 RX ADMIN — Medication 3 MILLILITER(S): at 20:24

## 2022-01-06 RX ADMIN — Medication 3 MILLILITER(S): at 03:54

## 2022-01-06 RX ADMIN — HEPARIN SODIUM 5000 UNIT(S): 5000 INJECTION INTRAVENOUS; SUBCUTANEOUS at 14:37

## 2022-01-06 RX ADMIN — HEPARIN SODIUM 5000 UNIT(S): 5000 INJECTION INTRAVENOUS; SUBCUTANEOUS at 06:56

## 2022-01-06 RX ADMIN — Medication 25 MILLIGRAM(S): at 06:55

## 2022-01-06 RX ADMIN — HEPARIN SODIUM 5000 UNIT(S): 5000 INJECTION INTRAVENOUS; SUBCUTANEOUS at 22:58

## 2022-01-06 RX ADMIN — Medication 3 MILLILITER(S): at 14:31

## 2022-01-06 RX ADMIN — Medication 100 MILLIGRAM(S): at 22:58

## 2022-01-06 RX ADMIN — BUDESONIDE AND FORMOTEROL FUMARATE DIHYDRATE 2 PUFF(S): 160; 4.5 AEROSOL RESPIRATORY (INHALATION) at 11:06

## 2022-01-06 NOTE — PROGRESS NOTE ADULT - SUBJECTIVE AND OBJECTIVE BOX
[facility] NEPHROLOGY- PROGRESS NOTE    71y Female with history of COPD, CHF presents with dizziness. Nephrology consulted for elevated Scr.    REVIEW OF SYSTEMS:  Gen: no changes in weight  Cards: no chest pain  Resp: + dyspnea  GI: no nausea or vomiting or diarrhea  Vascular: + LE edema improving    No Known Allergies      Hospital Medications: Medications reviewed      VITALS:  T(F): 97.7 (01-06-22 @ 06:15), Max: 98.1 (01-05-22 @ 12:57)  HR: 68 (01-06-22 @ 10:18)  BP: 137/78 (01-06-22 @ 06:15)  RR: 20 (01-06-22 @ 06:15)  SpO2: 96% (01-06-22 @ 11:00)  Wt(kg): --    01-05 @ 07:01  -  01-06 @ 07:00  --------------------------------------------------------  IN: 240 mL / OUT: 800 mL / NET: -560 mL        PHYSICAL EXAM:    Gen: NAD, calm  Cards: RRR, +S1/S2, no M/G/R  Resp: scattered wheezing, poor airway entry bilaterally, + tachypnea  GI: soft, NT/ND, NABS  Vascular: trace LE edema B/L          LABS:  01-06    139  |  99  |  116<H>  ----------------------------<  162<H>  4.7   |  30  |  1.95<H>    Ca    10.2      06 Jan 2022 07:11  Phos  4.4     01-06  Mg     2.50     01-06      Creatinine Trend: 1.95 <--, 2.34 <--, 1.81 <--, 1.82 <--, 1.83 <--, 1.86 <--                        9.4    7.52  )-----------( 295      ( 06 Jan 2022 07:11 )             32.6     Urine Studies:

## 2022-01-06 NOTE — PROGRESS NOTE ADULT - ASSESSMENT
71y Female with history of COPD, CHF presents with dizziness. Nephrology consulted for elevated Scr.    1) MISAEL: secondary to HF and infection. Scr now improving off of diuretics with severe azotemia due to steroids. UA active however contaminated and doubt patient with underlying GN. FeUrea low. Bladder scan negative. Avoid nephrotoxins.    2) CKD-3b: Baseline Scr 1.4-1.6. Outpatient CKD work up. Monitor electrolytes.    3) HTN with CKD: BP controlled. Continue with current medications. Monitor BP.    4) LE edema: Improving. Holding bumex 2 mg PO daily (last dose on 1/5). Elevated serum CO2 due to compensated respiratory acidosis. Prior TTE with grossly normal LVSF. Monitor UO.    5) Anemia: Hb low with iron deficiency s/p venofer s/p Epo 10K X 1 dose on 12/30. Will give another dose today. Monitor Hb.    6) Hyperkalemia: Resolved. Continue with low K diet. Monitor serum K.      [facility] NEPHROLOGY  Rodrigue Amador M.D.  Rob Preez D.O.  Ana Song M.D.  Lucrecia López, ALLISON, ANP-C    Telephone: [phone]  Facsimile: [phone]    [address] [place] [address]

## 2022-01-06 NOTE — PROGRESS NOTE ADULT - SUBJECTIVE AND OBJECTIVE BOX
SUBJECTIVE / OVERNIGHT EVENTS: pt seen and examined    MEDICATIONS  (STANDING):  albuterol/ipratropium for Nebulization 3 milliLiter(s) Nebulizer every 6 hours  benzonatate 100 milliGRAM(s) Oral every 8 hours  buDESOnide    Inhalation Suspension 0.5 milliGRAM(s) Inhalation two times a day  epoetin livia-epbx (RETACRIT) Injectable 31076 Unit(s) SubCutaneous once  ferrous    sulfate 325 milliGRAM(s) Oral daily  heparin   Injectable 5000 Unit(s) SubCutaneous every 8 hours  hydrALAZINE 25 milliGRAM(s) Oral every 8 hours  lidocaine   4% Patch 1 Patch Transdermal every 24 hours  metoprolol succinate ER 25 milliGRAM(s) Oral daily  pantoprazole    Tablet 40 milliGRAM(s) Oral before breakfast  simvastatin 10 milliGRAM(s) Oral at bedtime    MEDICATIONS  (PRN):  acetaminophen     Tablet .. 650 milliGRAM(s) Oral every 6 hours PRN Moderate Pain (4 - 6)  aluminum hydroxide/magnesium hydroxide/simethicone Suspension 30 milliLiter(s) Oral every 4 hours PRN Dyspepsia  oxymetazoline 0.05% Nasal Spray 2 Spray(s) Nasal every 12 hours PRN Epistaxis  sodium chloride 0.65% Nasal 1 Spray(s) Both Nostrils every 8 hours PRN Nasal Congestion    Vital Signs Last 24 Hrs  T(C): 36.5 (01-06-22 @ 06:15), Max: 36.5 (01-06-22 @ 06:15)  T(F): 97.7 (01-06-22 @ 06:15), Max: 97.7 (01-06-22 @ 06:15)  HR: 64 (01-06-22 @ 14:41) (62 - 73)  BP: 137/78 (01-06-22 @ 06:15) (137/78 - 137/78)  BP(mean): --  RR: 20 (01-06-22 @ 06:15) (20 - 20)  SpO2: 96% (01-06-22 @ 11:00) (96% - 98%)          Constitutional: No fever, fatigue  Skin: No rash.  Eyes: No recent vision problems or eye pain.  ENT: No congestion, ear pain, or sore throat.  Cardiovascular: No chest pain or palpation.  Respiratory: No cough, shortness of breath, congestion, or wheezing.  Gastrointestinal: No abdominal pain, nausea, vomiting, or diarrhea.  Genitourinary: No dysuria.  Musculoskeletal: No joint swelling.  Neurologic: No headache.    PHYSICAL EXAM:  GENERAL: NAD  EYES: EOMI, PERRLA  NECK: Supple, No JVD  CHEST/LUNG: dec breath sounds at bases, fine exp wheezing+  HEART:  S1 , S2 +  ABDOMEN: soft , bs+  EXTREMITIES: + edema   NEUROLOGY:alert awake    LABS:  01-06    139  |  99  |  116<H>  ----------------------------<  162<H>  4.7   |  30  |  1.95<H>    Ca    10.2      06 Jan 2022 07:11  Phos  4.4     01-06  Mg     2.50     01-06      Creatinine Trend: 1.95 <--, 2.34 <--, 1.81 <--, 1.82 <--, 1.83 <--, 1.86 <--                        9.4    7.52  )-----------( 295      ( 06 Jan 2022 07:11 )             32.6     Urine Studies:                pH, Arterial: 7.25  pH, Blood: x     /  pCO2: 76    /  pO2: 64    / HCO3: 33    / Base Excess: 3.6   /  SaO2: 91.9                        Creatinine, Random Urine: 153 mg/dL (12-26 @ 16:17)

## 2022-01-06 NOTE — PROGRESS NOTE ADULT - SUBJECTIVE AND OBJECTIVE BOX
PULMONARY PROGRESS NOTE    DAMARI GUERRERO  MRN-[id number]    Patient is a 71y old  Female who presents with a chief complaint of dizziness and sOB (02 Jan 2022 10:47)      HPI:  feels everytime she gets the steroids her breathing gets worse  -continues to wheeze  uncomfortable    MEDICATIONS  (STANDING):  albuterol/ipratropium for Nebulization 3 milliLiter(s) Nebulizer every 6 hours  benzonatate 100 milliGRAM(s) Oral every 8 hours  budesonide 160 MICROgram(s)/formoterol 4.5 MICROgram(s) Inhaler 2 Puff(s) Inhalation two times a day  epoetin livia-epbx (RETACRIT) Injectable 07247 Unit(s) SubCutaneous once  ferrous    sulfate 325 milliGRAM(s) Oral daily  heparin   Injectable 5000 Unit(s) SubCutaneous every 8 hours  hydrALAZINE 25 milliGRAM(s) Oral every 8 hours  lidocaine   4% Patch 1 Patch Transdermal every 24 hours  methylPREDNISolone sodium succinate Injectable 30 milliGRAM(s) IV Push every 8 hours  metoprolol succinate ER 25 milliGRAM(s) Oral daily  pantoprazole    Tablet 40 milliGRAM(s) Oral before breakfast  simvastatin 10 milliGRAM(s) Oral at bedtime    MEDICATIONS  (PRN):  acetaminophen     Tablet .. 650 milliGRAM(s) Oral every 6 hours PRN Moderate Pain (4 - 6)  aluminum hydroxide/magnesium hydroxide/simethicone Suspension 30 milliLiter(s) Oral every 4 hours PRN Dyspepsia  oxymetazoline 0.05% Nasal Spray 2 Spray(s) Nasal every 12 hours PRN Epistaxis  sodium chloride 0.65% Nasal 1 Spray(s) Both Nostrils every 8 hours PRN Nasal Congestion            EXAM:  Vital Signs Last 24 Hrs  T(C): 36.5 (06 Jan 2022 06:15), Max: 36.5 (06 Jan 2022 06:15)  T(F): 97.7 (06 Jan 2022 06:15), Max: 97.7 (06 Jan 2022 06:15)  HR: 68 (06 Jan 2022 10:18) (62 - 73)  BP: 137/78 (06 Jan 2022 06:15) (137/78 - 137/78)  BP(mean): --  RR: 20 (06 Jan 2022 06:15) (20 - 20)  SpO2: 96% (06 Jan 2022 11:00) (96% - 98%)  GENERAL: The patient is awake and alert   LUNGS: bilat wheeze                            9.4    7.52  )-----------( 295      ( 06 Jan 2022 07:11 )             32.6   01-06    139  |  99  |  116<H>  ----------------------------<  162<H>  4.7   |  30  |  1.95<H>    Ca    10.2      06 Jan 2022 07:11  Phos  4.4     01-06  Mg     2.50     01-06          < from: Xray Chest 1 View- PORTABLE-Urgent (Xray Chest 1 View- PORTABLE-Urgent .) (01.02.22 @ 13:17) >  ACC: [id number] EXAM:  XR CHEST PORTABLE URGENT 1V                          PROCEDURE DATE:  01/02/2022          INTERPRETATION:  HISTORY: Shortness of breath    TECHNIQUE: A single AP view of the chest was obtained.    COMPARISON: 12/30/2021    FINDINGS: The heart size cannot be adequately assessed on this single   view. The patient is status post median sternotomy and valve repair.   There is moderate pulmonary vascular congestion, improved. The patient's   chin obscures the bilateral lung apices.    IMPRESSION: Moderate pulmonary vascular congestion, improved..    --- End of Report ---    < end of copied text >        < from: Xray Chest 1 View AP/PA (12.30.21 @ 16:46) >  ACC: [id number] EXAM:  XR CHEST AP OR PA 1V                          PROCEDURE DATE:  12/30/2021          INTERPRETATION:  Chest one view    HISTORY: Shortness of breath    COMPARISON STUDY: 12/23/2021    Frontal expiratory view of the chest shows the heart to be similarly   enlarged in size. Sternal wires and cardiac valve ring are again noted.    The lungs show increased pulmonary congestion with small pleural   effusions and there is no evidence of pneumothorax.    IMPRESSION:  Increased congestion.        Thank you for the courtesy of this referral.    --- End of Report ---    < end of copied text >    PROBLEM LIST:  71y Female with HEALTH ISSUES - PROBLEM Dx:  Chronic obstructive pulmonary disease, unspecified COPD type    Hypertension    CAD (coronary artery disease)    Prophylactic measure    Edema    High creatinine    Acute on chronic diastolic congestive heart failure      RECS:    -placed back on bipap which makes her feel beter  -added nebs atc yesterday, dc symbicort add pulmicort nebs bid, unsure if using inhaler correctly  -will change solumedrol to prednisone and try to taper, pt feels solumedrol makes her feel worse  -Patient with chronic hypercarbic respiratory failure secondary to COPD/obesity, she would benefit from noninvasive ventilation QHS/during sleep while home to avoid further episodes of respiratory failure and rehospitalization.  -supplemental o2,wean as tolerated, do not aim for sats of 100%  -incentive jovanny        Em Merida MD  [phone]

## 2022-01-06 NOTE — PROGRESS NOTE ADULT - ASSESSMENT
71 year old hx of HTN, dCHF, MV and TV replacement, MIGUEL not on CPAP due to unable to afford, afib s/p ablation not on AC due to chronic bleeding, unable to do c-scope or endoscopy due to medical conditions, COPD on 3 L NC, here for dizziness. x1 week, feeling generally weakness .    EKG - NSR RBBB  Tele - episodes of Afib   Echo in 9/21 shows normal LV s/p MVR     1) Acute on chronic diastolic CHF   - echo in 9/21 shows normal LV s/p MVR  - monitor urine out put, daily weights  -creat worsened, bumex hled, now creatnine improved      2) COPD   - on O2   - c/w steroids still wheezing   - f/u pulm     3) Afib  - not on AC, pt doesn't remember why , as per chart sec to GI bleed  - cont toprol     4) MISAEL  -f/u renal recs    5) DVT prophylaxis   - on sc heparin

## 2022-01-06 NOTE — PROGRESS NOTE ADULT - SUBJECTIVE AND OBJECTIVE BOX
CC: F/U for hMPV    Saw/spoke to patient. No fevers, no chills. No new complaints.    Allergies  No Known Allergies    ANTIMICROBIALS:  Off    PE:    Vital Signs Last 24 Hrs  T(C): 36.5 (06 Jan 2022 06:15), Max: 36.5 (06 Jan 2022 06:15)  T(F): 97.7 (06 Jan 2022 06:15), Max: 97.7 (06 Jan 2022 06:15)  HR: 64 (06 Jan 2022 14:41) (62 - 73)  BP: 137/78 (06 Jan 2022 06:15) (137/78 - 137/78)  RR: 20 (06 Jan 2022 06:15) (20 - 20)  SpO2: 96% (06 Jan 2022 11:00) (96% - 98%)    Gen: AOx3, NAD, non-toxic  CV: S1+S2 normal, nontachycardic  Resp: Clear bilat, no resp distress, no crackles/wheezes, nasal canula  Abd: Soft, nontender, +BS  Ext: No LE edema, no wounds    LABS:                        9.4    7.52  )-----------( 295      ( 06 Jan 2022 07:11 )             32.6     01-06    139  |  99  |  116<H>  ----------------------------<  162<H>  4.7   |  30  |  1.95<H>    Ca    10.2      06 Jan 2022 07:11  Phos  4.4     01-06  Mg     2.50     01-06    MICROBIOLOGY:    Rapid RVP Result: Detected (01-02 @ 17:52) hMPV    RADIOLOGY:    1/2 XR:    FINDINGS: The heart size cannot be adequately assessed on this single   view. The patient is status post median sternotomy and valve repair.   There is moderate pulmonary vascular congestion, improved. The patient's   chin obscures the bilateral lung apices.    IMPRESSION: Moderate pulmonary vascular congestion, improved..

## 2022-01-06 NOTE — PROGRESS NOTE ADULT - ASSESSMENT
70 yo F with CHF, MV/TV replacement, MIGUEL, initially with dizziness, weakness  No fever, no leukocytosis  Suspected viral URI  RVP human Metapneumovirus  On treatment for COPD/CHF  Overall,  1) New Viral URI  - RVP+ hMPV  - Supportive care  - Contact precautions per protocol  - Monitor for signs secondary bacterial pneumonia (monitor off abx for now)  2) SOB  - Follow up pulmonary--CHF/COPD  - If on ongoing bipap--consider CT Chest to evaluate for alternate causes SOB; monitor CBCs for any leukocytosis (although note steroids)  3) CKD  - Trend Cr to baseline    Thee Gupta MD  Pager [phone]  From 5pm-9am, and on weekends call [phone]

## 2022-01-06 NOTE — PROGRESS NOTE ADULT - SUBJECTIVE AND OBJECTIVE BOX
Jonas Frias MD  Interventional Cardiology / Advance Heart Failure and Cardiac Transplant Specialist  [place] Office : [address] [place] [address] [place]. [address]  Tel:   [place] Office : [address] [facility] [place] [address]  Tel: [phone]  Cell : [phone]    Pt is lying in bed comfortable not in distress, no chest pains some SOB no palpitations  	  MEDICATIONS:  heparin   Injectable 5000 Unit(s) SubCutaneous every 8 hours  hydrALAZINE 25 milliGRAM(s) Oral every 8 hours  metoprolol succinate ER 25 milliGRAM(s) Oral daily      albuterol/ipratropium for Nebulization 3 milliLiter(s) Nebulizer every 6 hours  benzonatate 100 milliGRAM(s) Oral every 8 hours  budesonide 160 MICROgram(s)/formoterol 4.5 MICROgram(s) Inhaler 2 Puff(s) Inhalation two times a day    acetaminophen     Tablet .. 650 milliGRAM(s) Oral every 6 hours PRN    aluminum hydroxide/magnesium hydroxide/simethicone Suspension 30 milliLiter(s) Oral every 4 hours PRN  pantoprazole    Tablet 40 milliGRAM(s) Oral before breakfast    methylPREDNISolone sodium succinate Injectable 30 milliGRAM(s) IV Push every 8 hours  simvastatin 10 milliGRAM(s) Oral at bedtime    epoetin livia-epbx (RETACRIT) Injectable 29328 Unit(s) SubCutaneous once  ferrous    sulfate 325 milliGRAM(s) Oral daily  lidocaine   4% Patch 1 Patch Transdermal every 24 hours  oxymetazoline 0.05% Nasal Spray 2 Spray(s) Nasal every 12 hours PRN  sodium chloride 0.65% Nasal 1 Spray(s) Both Nostrils every 8 hours PRN      PAST MEDICAL/SURGICAL HISTORY  PAST MEDICAL & SURGICAL HISTORY:  Atrial fibrillation  S/P Ablation    Pulmonary hypertension    MIGUEL (obstructive sleep apnea)    COPD (chronic obstructive pulmonary disease)  on home O2    CHF (congestive heart failure)    HTN (hypertension)    Gout    Mitral valve replaced    Tricuspid valve replaced    CHF (congestive heart failure)    Hypertension    COPD (chronic obstructive pulmonary disease)    History of mitral valve replacement with mechanical valve    Tricuspid valve replaced  mechanical    No significant past surgical history        SOCIAL HISTORY: Substance Use (street drugs): ( x ) never used  (  ) other:    FAMILY HISTORY:  Family history of hypertension (Father, Sibling)    Family history of stroke    Family history of hypertension (Mother)              PHYSICAL EXAM:  T(C): 36.5 (01-06-22 @ 06:15), Max: 36.5 (01-06-22 @ 06:15)  HR: 68 (01-06-22 @ 10:18) (62 - 73)  BP: 137/78 (01-06-22 @ 06:15) (137/78 - 137/78)  RR: 20 (01-06-22 @ 06:15) (20 - 20)  SpO2: 96% (01-06-22 @ 11:00) (96% - 98%)  Wt(kg): --  I&O's Summary    05 Jan 2022 07:01  -  06 Jan 2022 07:00  --------------------------------------------------------  IN: 240 mL / OUT: 800 mL / NET: -560 mL          EYES:   PERRLA   ENMT:   Moist mucous membranes, Good dentition, No lesions  Cardiovascular: Normal S1 S2, No JVD, No murmurs, No edema  Respiratory: b/l exp wheezing   Gastrointestinal:  Soft, Non-tender, + BS	  Extremities: no edema                                    9.4    7.52  )-----------( 295      ( 06 Jan 2022 07:11 )             32.6     01-06    139  |  99  |  116<H>  ----------------------------<  162<H>  4.7   |  30  |  1.95<H>    Ca    10.2      06 Jan 2022 07:11  Phos  4.4     01-06  Mg     2.50     01-06      proBNP:   Lipid Profile:   HgA1c:   TSH:     Consultant(s) Notes Reviewed:  [x ] YES  [ ] NO    Care Discussed with Consultants/Other Providers [ x] YES  [ ] NO    Imaging Personally Reviewed independently:  [x] YES  [ ] NO    All labs, radiologic studies, vitals, orders and medications list reviewed. Patient is seen and examined at bedside. Case discussed with medical team.

## 2022-01-07 LAB
ANION GAP SERPL CALC-SCNC: 9 MMOL/L — SIGNIFICANT CHANGE UP (ref 7–14)
BASE EXCESS BLDV CALC-SCNC: 3.4 MMOL/L — HIGH (ref -2–3)
BUN SERPL-MCNC: 104 MG/DL — HIGH (ref 7–23)
CALCIUM SERPL-MCNC: 9.8 MG/DL — SIGNIFICANT CHANGE UP (ref 8.4–10.5)
CHLORIDE SERPL-SCNC: 101 MMOL/L — SIGNIFICANT CHANGE UP (ref 98–107)
CO2 BLDV-SCNC: 35.8 MMOL/L — HIGH (ref 22–26)
CO2 SERPL-SCNC: 31 MMOL/L — SIGNIFICANT CHANGE UP (ref 22–31)
CREAT SERPL-MCNC: 1.65 MG/DL — HIGH (ref 0.5–1.3)
GLUCOSE SERPL-MCNC: 155 MG/DL — HIGH (ref 70–99)
HCO3 BLDV-SCNC: 33 MMOL/L — HIGH (ref 22–29)
HCT VFR BLD CALC: 33.5 % — LOW (ref 34.5–45)
HGB BLD-MCNC: 9.3 G/DL — LOW (ref 11.5–15.5)
MAGNESIUM SERPL-MCNC: 2.6 MG/DL — SIGNIFICANT CHANGE UP (ref 1.6–2.6)
MCHC RBC-ENTMCNC: 27 PG — SIGNIFICANT CHANGE UP (ref 27–34)
MCHC RBC-ENTMCNC: 27.8 GM/DL — LOW (ref 32–36)
MCV RBC AUTO: 97.4 FL — SIGNIFICANT CHANGE UP (ref 80–100)
NRBC # BLD: 0 /100 WBCS — SIGNIFICANT CHANGE UP
NRBC # FLD: 0.02 K/UL — HIGH
PCO2 BLDV: 78 MMHG — HIGH (ref 39–42)
PH BLDV: 7.24 — LOW (ref 7.32–7.43)
PHOSPHATE SERPL-MCNC: 3.2 MG/DL — SIGNIFICANT CHANGE UP (ref 2.5–4.5)
PLATELET # BLD AUTO: 278 K/UL — SIGNIFICANT CHANGE UP (ref 150–400)
PO2 BLDV: 73 MMHG — SIGNIFICANT CHANGE UP
POTASSIUM SERPL-MCNC: 4.4 MMOL/L — SIGNIFICANT CHANGE UP (ref 3.5–5.3)
POTASSIUM SERPL-SCNC: 4.4 MMOL/L — SIGNIFICANT CHANGE UP (ref 3.5–5.3)
RBC # BLD: 3.44 M/UL — LOW (ref 3.8–5.2)
RBC # FLD: 18 % — HIGH (ref 10.3–14.5)
SAO2 % BLDV: 94 % — SIGNIFICANT CHANGE UP
SODIUM SERPL-SCNC: 141 MMOL/L — SIGNIFICANT CHANGE UP (ref 135–145)
WBC # BLD: 6.37 K/UL — SIGNIFICANT CHANGE UP (ref 3.8–10.5)
WBC # FLD AUTO: 6.37 K/UL — SIGNIFICANT CHANGE UP (ref 3.8–10.5)

## 2022-01-07 PROCEDURE — 99232 SBSQ HOSP IP/OBS MODERATE 35: CPT

## 2022-01-07 PROCEDURE — 99233 SBSQ HOSP IP/OBS HIGH 50: CPT

## 2022-01-07 RX ORDER — METOPROLOL TARTRATE 50 MG
50 TABLET ORAL DAILY
Refills: 0 | Status: DISCONTINUED | OUTPATIENT
Start: 2022-01-07 | End: 2022-01-14

## 2022-01-07 RX ORDER — IPRATROPIUM/ALBUTEROL SULFATE 18-103MCG
3 AEROSOL WITH ADAPTER (GRAM) INHALATION ONCE
Refills: 0 | Status: COMPLETED | OUTPATIENT
Start: 2022-01-07 | End: 2022-01-07

## 2022-01-07 RX ORDER — BUMETANIDE 0.25 MG/ML
1 INJECTION INTRAMUSCULAR; INTRAVENOUS DAILY
Refills: 0 | Status: DISCONTINUED | OUTPATIENT
Start: 2022-01-08 | End: 2022-01-10

## 2022-01-07 RX ORDER — ERYTHROPOIETIN 10000 [IU]/ML
10000 INJECTION, SOLUTION INTRAVENOUS; SUBCUTANEOUS ONCE
Refills: 0 | Status: COMPLETED | OUTPATIENT
Start: 2022-01-07 | End: 2022-01-09

## 2022-01-07 RX ADMIN — Medication 0.5 MILLIGRAM(S): at 09:32

## 2022-01-07 RX ADMIN — Medication 25 MILLIGRAM(S): at 06:35

## 2022-01-07 RX ADMIN — Medication 3 MILLILITER(S): at 22:21

## 2022-01-07 RX ADMIN — Medication 650 MILLIGRAM(S): at 02:00

## 2022-01-07 RX ADMIN — Medication 0.5 MILLIGRAM(S): at 22:21

## 2022-01-07 RX ADMIN — HEPARIN SODIUM 5000 UNIT(S): 5000 INJECTION INTRAVENOUS; SUBCUTANEOUS at 06:35

## 2022-01-07 RX ADMIN — Medication 3 MILLILITER(S): at 11:24

## 2022-01-07 RX ADMIN — Medication 3 MILLILITER(S): at 15:41

## 2022-01-07 RX ADMIN — LIDOCAINE 1 PATCH: 4 CREAM TOPICAL at 06:43

## 2022-01-07 RX ADMIN — Medication 25 MILLIGRAM(S): at 22:04

## 2022-01-07 RX ADMIN — HEPARIN SODIUM 5000 UNIT(S): 5000 INJECTION INTRAVENOUS; SUBCUTANEOUS at 14:09

## 2022-01-07 RX ADMIN — Medication 25 MILLIGRAM(S): at 14:09

## 2022-01-07 RX ADMIN — Medication 100 MILLIGRAM(S): at 14:09

## 2022-01-07 RX ADMIN — Medication 40 MILLIGRAM(S): at 06:34

## 2022-01-07 RX ADMIN — PANTOPRAZOLE SODIUM 40 MILLIGRAM(S): 20 TABLET, DELAYED RELEASE ORAL at 06:34

## 2022-01-07 RX ADMIN — HEPARIN SODIUM 5000 UNIT(S): 5000 INJECTION INTRAVENOUS; SUBCUTANEOUS at 22:04

## 2022-01-07 RX ADMIN — Medication 3 MILLILITER(S): at 09:32

## 2022-01-07 RX ADMIN — LIDOCAINE 1 PATCH: 4 CREAM TOPICAL at 22:05

## 2022-01-07 RX ADMIN — Medication 100 MILLIGRAM(S): at 06:34

## 2022-01-07 RX ADMIN — Medication 325 MILLIGRAM(S): at 14:09

## 2022-01-07 RX ADMIN — SIMVASTATIN 10 MILLIGRAM(S): 20 TABLET, FILM COATED ORAL at 22:05

## 2022-01-07 RX ADMIN — Medication 3 MILLILITER(S): at 04:00

## 2022-01-07 RX ADMIN — Medication 100 MILLIGRAM(S): at 22:05

## 2022-01-07 RX ADMIN — Medication 650 MILLIGRAM(S): at 01:32

## 2022-01-07 RX ADMIN — Medication 50 MILLIGRAM(S): at 14:13

## 2022-01-07 NOTE — PROGRESS NOTE ADULT - SUBJECTIVE AND OBJECTIVE BOX
[facility] NEPHROLOGY- PROGRESS NOTE    71y Female with history of COPD, CHF presents with dizziness. Nephrology consulted for elevated Scr.    REVIEW OF SYSTEMS:  Gen: no changes in weight  Cards: no chest pain  Resp: + dyspnea  GI: no nausea or vomiting or diarrhea  Vascular: + LE edema improving    No Known Allergies      Hospital Medications: Medications reviewed      VITALS:  T(F): 98 (01-06-22 @ 22:55), Max: 98.4 (01-06-22 @ 13:03)  HR: 68 (01-07-22 @ 11:15)  BP: 137/70 (01-07-22 @ 06:30)  RR: 17 (01-06-22 @ 22:55)  SpO2: 96% (01-07-22 @ 11:15)  Wt(kg): --        PHYSICAL EXAM:    Gen: NAD, calm  Cards: RRR, +S1/S2, no M/G/R  Resp: scattered wheezing, poor airway entry bilaterally, + tachypnea  GI: soft, NT/ND, NABS  Vascular: trace LE edema B/L          LABS:  01-07    141  |  101  |  104<H>  ----------------------------<  155<H>  4.4   |  31  |  1.65<H>    Ca    9.8      07 Jan 2022 07:31  Phos  3.2     01-07  Mg     2.60     01-07      Creatinine Trend: 1.65 <--, 1.95 <--, 2.34 <--, 1.81 <--, 1.82 <--, 1.83 <--                        9.3    6.37  )-----------( 278      ( 07 Jan 2022 07:31 )             33.5     Urine Studies:

## 2022-01-07 NOTE — PROGRESS NOTE ADULT - SUBJECTIVE AND OBJECTIVE BOX
Jonas Frias MD  Interventional Cardiology / Advance Heart Failure and Cardiac Transplant Specialist  [place] Office : [address] [place] [address] [place]. [address]  Tel:   [place] Office : [address] [facility] [place] [address]  Tel: [phone]  Cell : [phone]    Pt is lying in bed comfortable not in distress, no chest pains some SOB no palpitations  	  MEDICATIONS:  heparin   Injectable 5000 Unit(s) SubCutaneous every 8 hours  hydrALAZINE 25 milliGRAM(s) Oral every 8 hours  metoprolol succinate ER 25 milliGRAM(s) Oral daily      albuterol/ipratropium for Nebulization 3 milliLiter(s) Nebulizer every 6 hours  benzonatate 100 milliGRAM(s) Oral every 8 hours  buDESOnide    Inhalation Suspension 0.5 milliGRAM(s) Inhalation two times a day    acetaminophen     Tablet .. 650 milliGRAM(s) Oral every 6 hours PRN    aluminum hydroxide/magnesium hydroxide/simethicone Suspension 30 milliLiter(s) Oral every 4 hours PRN  pantoprazole    Tablet 40 milliGRAM(s) Oral before breakfast    predniSONE   Tablet 40 milliGRAM(s) Oral daily  simvastatin 10 milliGRAM(s) Oral at bedtime    epoetin livia-epbx (RETACRIT) Injectable 29648 Unit(s) SubCutaneous once  ferrous    sulfate 325 milliGRAM(s) Oral daily  lidocaine   4% Patch 1 Patch Transdermal every 24 hours  oxymetazoline 0.05% Nasal Spray 2 Spray(s) Nasal every 12 hours PRN  sodium chloride 0.65% Nasal 1 Spray(s) Both Nostrils every 8 hours PRN      PAST MEDICAL/SURGICAL HISTORY  PAST MEDICAL & SURGICAL HISTORY:  Atrial fibrillation  S/P Ablation    Pulmonary hypertension    MIGUEL (obstructive sleep apnea)    COPD (chronic obstructive pulmonary disease)  on home O2    CHF (congestive heart failure)    HTN (hypertension)    Gout    Mitral valve replaced    Tricuspid valve replaced    CHF (congestive heart failure)    Hypertension    COPD (chronic obstructive pulmonary disease)    History of mitral valve replacement with mechanical valve    Tricuspid valve replaced  mechanical    No significant past surgical history        SOCIAL HISTORY: Substance Use (street drugs): ( x ) never used  (  ) other:    FAMILY HISTORY:  Family history of hypertension (Father, Sibling)    Family history of stroke    Family history of hypertension (Mother)            PHYSICAL EXAM:  T(C): 36.7 (01-06-22 @ 22:55), Max: 36.9 (01-06-22 @ 13:03)  HR: 67 (01-07-22 @ 09:32) (64 - 79)  BP: 137/70 (01-07-22 @ 06:30) (137/70 - 156/73)  RR: 17 (01-06-22 @ 22:55) (17 - 18)  SpO2: 96% (01-07-22 @ 07:25) (96% - 100%)  Wt(kg): --  I&O's Summary           EYES:   PERRLA   ENMT:   Moist mucous membranes, Good dentition, No lesions  Cardiovascular: Normal S1 S2, No JVD, No murmurs, No edema  Respiratory: b/l exp wheezing 	  Gastrointestinal:  Soft, Non-tender, + BS	  Extremities: no edema                                    9.3    6.37  )-----------( 278      ( 07 Jan 2022 07:31 )             33.5     01-07    141  |  101  |  104<H>  ----------------------------<  155<H>  4.4   |  31  |  1.65<H>    Ca    9.8      07 Jan 2022 07:31  Phos  3.2     01-07  Mg     2.60     01-07      proBNP:   Lipid Profile:   HgA1c:   TSH:     Consultant(s) Notes Reviewed:  [x ] YES  [ ] NO    Care Discussed with Consultants/Other Providers [ x] YES  [ ] NO    Imaging Personally Reviewed independently:  [x] YES  [ ] NO    All labs, radiologic studies, vitals, orders and medications list reviewed. Patient is seen and examined at bedside. Case discussed with medical team.

## 2022-01-07 NOTE — PROGRESS NOTE ADULT - SUBJECTIVE AND OBJECTIVE BOX
PULMONARY PROGRESS NOTE    DAMARI GUERRERO  MRN-[id number]    Patient is a 71y old  Female who presents with a chief complaint of dizziness and sOB (02 Jan 2022 10:47)      HPI:  still sob      MEDICATIONS  (STANDING):  albuterol/ipratropium for Nebulization 3 milliLiter(s) Nebulizer every 6 hours  benzonatate 100 milliGRAM(s) Oral every 8 hours  buDESOnide    Inhalation Suspension 0.5 milliGRAM(s) Inhalation two times a day  epoetin livia-epbx (RETACRIT) Injectable 56750 Unit(s) SubCutaneous once  ferrous    sulfate 325 milliGRAM(s) Oral daily  heparin   Injectable 5000 Unit(s) SubCutaneous every 8 hours  hydrALAZINE 25 milliGRAM(s) Oral every 8 hours  lidocaine   4% Patch 1 Patch Transdermal every 24 hours  metoprolol succinate ER 25 milliGRAM(s) Oral daily  pantoprazole    Tablet 40 milliGRAM(s) Oral before breakfast  predniSONE   Tablet 40 milliGRAM(s) Oral daily  simvastatin 10 milliGRAM(s) Oral at bedtime    MEDICATIONS  (PRN):  acetaminophen     Tablet .. 650 milliGRAM(s) Oral every 6 hours PRN Moderate Pain (4 - 6)  aluminum hydroxide/magnesium hydroxide/simethicone Suspension 30 milliLiter(s) Oral every 4 hours PRN Dyspepsia  oxymetazoline 0.05% Nasal Spray 2 Spray(s) Nasal every 12 hours PRN Epistaxis  sodium chloride 0.65% Nasal 1 Spray(s) Both Nostrils every 8 hours PRN Nasal Congestion        EXAM:  Vital Signs Last 24 Hrs  T(C): 36.7 (06 Jan 2022 22:55), Max: 36.9 (06 Jan 2022 13:03)  T(F): 98 (06 Jan 2022 22:55), Max: 98.4 (06 Jan 2022 13:03)  HR: 68 (07 Jan 2022 11:15) (64 - 79)  BP: 137/70 (07 Jan 2022 06:30) (137/70 - 156/73)  BP(mean): --  RR: 17 (06 Jan 2022 22:55) (17 - 18)  SpO2: 96% (07 Jan 2022 11:15) (96% - 100%)  GENERAL: The patient is awake and alert   LUNGS: bilat wheeze                                       9.3    6.37  )-----------( 278      ( 07 Jan 2022 07:31 )             33.5   01-07    141  |  101  |  104<H>  ----------------------------<  155<H>  4.4   |  31  |  1.65<H>    Ca    9.8      07 Jan 2022 07:31  Phos  3.2     01-07  Mg     2.60     01-07          < from: Xray Chest 1 View- PORTABLE-Urgent (Xray Chest 1 View- PORTABLE-Urgent .) (01.02.22 @ 13:17) >  ACC: [id number] EXAM:  XR CHEST PORTABLE URGENT 1V                          PROCEDURE DATE:  01/02/2022          INTERPRETATION:  HISTORY: Shortness of breath    TECHNIQUE: A single AP view of the chest was obtained.    COMPARISON: 12/30/2021    FINDINGS: The heart size cannot be adequately assessed on this single   view. The patient is status post median sternotomy and valve repair.   There is moderate pulmonary vascular congestion, improved. The patient's   chin obscures the bilateral lung apices.    IMPRESSION: Moderate pulmonary vascular congestion, improved..    --- End of Report ---    < end of copied text >        < from: Xray Chest 1 View AP/PA (12.30.21 @ 16:46) >  ACC: [id number] EXAM:  XR CHEST AP OR PA 1V                          PROCEDURE DATE:  12/30/2021          INTERPRETATION:  Chest one view    HISTORY: Shortness of breath    COMPARISON STUDY: 12/23/2021    Frontal expiratory view of the chest shows the heart to be similarly   enlarged in size. Sternal wires and cardiac valve ring are again noted.    The lungs show increased pulmonary congestion with small pleural   effusions and there is no evidence of pneumothorax.    IMPRESSION:  Increased congestion.        Thank you for the courtesy of this referral.    --- End of Report ---    < end of copied text >    PROBLEM LIST:  71y Female with HEALTH ISSUES - PROBLEM Dx:  Chronic obstructive pulmonary disease, unspecified COPD type    Hypertension    CAD (coronary artery disease)    Prophylactic measure    Edema    High creatinine    Acute on chronic diastolic congestive heart failure      RECS:    -nebs atc,  pulmicort nebs bid  -solumedrol changed to prednisone yesterday bc pt did not like how it made her feel after getting IV dose.  Cont  prednisone taper, decrease by 10 mg every 3 days.  -Patient with chronic hypercarbic respiratory failure secondary to COPD/obesity, she would benefit from noninvasive ventilation QHS/during sleep while home to avoid further episodes of respiratory failure and rehospitalization.  -cont bipap PRN and during sleep  -supplemental o2,wean as tolerated, do not aim for sats of 100%  -incentive joavnny  -would try to diurese today, monitor kidney function        Em Merida MD  [phone]

## 2022-01-07 NOTE — PROGRESS NOTE ADULT - SUBJECTIVE AND OBJECTIVE BOX
SUBJECTIVE / OVERNIGHT EVENTS: pt seen and examined  still shortness of breath   MEDICATIONS  (STANDING):  albuterol/ipratropium for Nebulization 3 milliLiter(s) Nebulizer every 6 hours  benzonatate 100 milliGRAM(s) Oral every 8 hours  buDESOnide    Inhalation Suspension 0.5 milliGRAM(s) Inhalation two times a day  epoetin livia-epbx (RETACRIT) Injectable 46482 Unit(s) SubCutaneous once  ferrous    sulfate 325 milliGRAM(s) Oral daily  heparin   Injectable 5000 Unit(s) SubCutaneous every 8 hours  hydrALAZINE 25 milliGRAM(s) Oral every 8 hours  lidocaine   4% Patch 1 Patch Transdermal every 24 hours  metoprolol succinate ER 50 milliGRAM(s) Oral daily  pantoprazole    Tablet 40 milliGRAM(s) Oral before breakfast  simvastatin 10 milliGRAM(s) Oral at bedtime    MEDICATIONS  (PRN):  acetaminophen     Tablet .. 650 milliGRAM(s) Oral every 6 hours PRN Moderate Pain (4 - 6)  aluminum hydroxide/magnesium hydroxide/simethicone Suspension 30 milliLiter(s) Oral every 4 hours PRN Dyspepsia  oxymetazoline 0.05% Nasal Spray 2 Spray(s) Nasal every 12 hours PRN Epistaxis  sodium chloride 0.65% Nasal 1 Spray(s) Both Nostrils every 8 hours PRN Nasal Congestion    Vital Signs Last 24 Hrs  T(C): 36.6 (01-07-22 @ 17:49), Max: 36.8 (01-07-22 @ 14:06)  T(F): 97.9 (01-07-22 @ 17:49), Max: 98.3 (01-07-22 @ 14:06)  HR: 70 (01-07-22 @ 19:36) (65 - 79)  BP: 152/75 (01-07-22 @ 17:49) (137/70 - 156/73)  BP(mean): --  RR: 18 (01-07-22 @ 17:49) (17 - 18)  SpO2: 98% (01-07-22 @ 17:49) (96% - 100%)          Constitutional: No fever, fatigue  Skin: No rash.  Eyes: No recent vision problems or eye pain.  ENT: No congestion, ear pain, or sore throat.  Cardiovascular: No chest pain or palpation.  Respiratory: No cough, shortness of breath, congestion, or wheezing.  Gastrointestinal: No abdominal pain, nausea, vomiting, or diarrhea.  Genitourinary: No dysuria.  Musculoskeletal: No joint swelling.  Neurologic: No headache.    PHYSICAL EXAM:  GENERAL: NAD  EYES: EOMI, PERRLA  NECK: Supple, No JVD  CHEST/LUNG: dec breath sounds at bases, fine exp wheezing+  HEART:  S1 , S2 +  ABDOMEN: soft , bs+  EXTREMITIES: + edema   NEUROLOGY:alert awake    LABS:  01-07    141  |  101  |  104<H>  ----------------------------<  155<H>  4.4   |  31  |  1.65<H>    Ca    9.8      07 Jan 2022 07:31  Phos  3.2     01-07  Mg     2.60     01-07      Creatinine Trend: 1.65 <--, 1.95 <--, 2.34 <--, 1.81 <--, 1.82 <--, 1.83 <--                        9.3    6.37  )-----------( 278      ( 07 Jan 2022 07:31 )             33.5     Urine Studies:                    Creatinine, Random Urine: 153 mg/dL (12-26 @ 16:17)

## 2022-01-07 NOTE — PROGRESS NOTE ADULT - SUBJECTIVE AND OBJECTIVE BOX
CC: F/U for URI    Saw/spoke to patient. No fevers, no chills. No new complaints.    Allergies  No Known Allergies    ANTIMICROBIALS:  Off    PE:    Vital Signs Last 24 Hrs  T(C): 36.8 (07 Jan 2022 14:06), Max: 36.8 (07 Jan 2022 14:06)  T(F): 98.3 (07 Jan 2022 14:06), Max: 98.3 (07 Jan 2022 14:06)  HR: 69 (07 Jan 2022 14:06) (65 - 79)  BP: 150/85 (07 Jan 2022 14:06) (137/70 - 156/73)  RR: 18 (07 Jan 2022 14:06) (17 - 18)  SpO2: 96% (07 Jan 2022 14:06) (96% - 100%)    Gen: AOx3, NAD, non-toxic  CV: S1+S2 normal, nontachycardic  Resp: Clear bilat, no resp distress, no crackles/wheezes, NC  Abd: Soft, nontender, +BS  Ext: No LE edema, no wounds    LABS:                        9.3    6.37  )-----------( 278      ( 07 Jan 2022 07:31 )             33.5     01-07    141  |  101  |  104<H>  ----------------------------<  155<H>  4.4   |  31  |  1.65<H>    Ca    9.8      07 Jan 2022 07:31  Phos  3.2     01-07  Mg     2.60     01-07    MICROBIOLOGY:    Rapid RVP Result: Detected (01-02 @ 17:52) COVID    RADIOLOGY:    1/2 XR:    FINDINGS: The heart size cannot be adequately assessed on this single   view. The patient is status post median sternotomy and valve repair.   There is moderate pulmonary vascular congestion, improved. The patient's   chin obscures the bilateral lung apices.    IMPRESSION: Moderate pulmonary vascular congestion, improved..

## 2022-01-07 NOTE — CHART NOTE - NSCHARTNOTEFT_GEN_A_CORE
Patient has chronic respiratory failure secondary to COPD requiring noninvasive ventilation. Patient had trial and failed BiPAP at setting of 15/10 with backup rate of 12. Please expedite authorization so patient can be discharge home safely

## 2022-01-07 NOTE — PROGRESS NOTE ADULT - ASSESSMENT
72 yo F with CHF, MV/TV replacement, MIGUEL, initially with dizziness, weakness  No fever, no leukocytosis  Suspected viral URI  RVP human Metapneumovirus  On treatment for COPD/CHF  Overall,  1) Viral URI  - RVP+ hMPV  - Supportive care  - Contact precautions per protocol  - Monitor for signs secondary bacterial pneumonia (monitor off abx for now)  2) SOB  - Follow up pulmonary--CHF/COPD  - If worsening resp status--consider CT Chest to evaluate for alternate causes SOB; monitor CBCs for any leukocytosis (although note steroids)  3) CKD  - Trend Cr to baseline    Signing off. Please call with further questions or change in status.    Thee Gupta MD  Pager [phone]  From 5pm-9am, and on weekends call [phone]

## 2022-01-08 LAB
ANION GAP SERPL CALC-SCNC: 8 MMOL/L — SIGNIFICANT CHANGE UP (ref 7–14)
BASE EXCESS BLDV CALC-SCNC: 7 MMOL/L — HIGH (ref -2–3)
BUN SERPL-MCNC: 98 MG/DL — HIGH (ref 7–23)
CALCIUM SERPL-MCNC: 10 MG/DL — SIGNIFICANT CHANGE UP (ref 8.4–10.5)
CHLORIDE SERPL-SCNC: 103 MMOL/L — SIGNIFICANT CHANGE UP (ref 98–107)
CO2 BLDV-SCNC: 36.7 MMOL/L — HIGH (ref 22–26)
CO2 SERPL-SCNC: 31 MMOL/L — SIGNIFICANT CHANGE UP (ref 22–31)
CREAT SERPL-MCNC: 1.46 MG/DL — HIGH (ref 0.5–1.3)
GLUCOSE SERPL-MCNC: 137 MG/DL — HIGH (ref 70–99)
HCO3 BLDV-SCNC: 35 MMOL/L — HIGH (ref 22–29)
HCT VFR BLD CALC: 33.9 % — LOW (ref 34.5–45)
HGB BLD-MCNC: 9.5 G/DL — LOW (ref 11.5–15.5)
MAGNESIUM SERPL-MCNC: 2.5 MG/DL — SIGNIFICANT CHANGE UP (ref 1.6–2.6)
MCHC RBC-ENTMCNC: 27.1 PG — SIGNIFICANT CHANGE UP (ref 27–34)
MCHC RBC-ENTMCNC: 28 GM/DL — LOW (ref 32–36)
MCV RBC AUTO: 96.9 FL — SIGNIFICANT CHANGE UP (ref 80–100)
NRBC # BLD: 0 /100 WBCS — SIGNIFICANT CHANGE UP
NRBC # FLD: 0.02 K/UL — HIGH
PCO2 BLDV: 63 MMHG — HIGH (ref 39–42)
PH BLDV: 7.35 — SIGNIFICANT CHANGE UP (ref 7.32–7.43)
PHOSPHATE SERPL-MCNC: 2.5 MG/DL — SIGNIFICANT CHANGE UP (ref 2.5–4.5)
PLATELET # BLD AUTO: 279 K/UL — SIGNIFICANT CHANGE UP (ref 150–400)
PO2 BLDV: 85 MMHG — SIGNIFICANT CHANGE UP
POTASSIUM SERPL-MCNC: 4.5 MMOL/L — SIGNIFICANT CHANGE UP (ref 3.5–5.3)
POTASSIUM SERPL-SCNC: 4.5 MMOL/L — SIGNIFICANT CHANGE UP (ref 3.5–5.3)
RBC # BLD: 3.5 M/UL — LOW (ref 3.8–5.2)
RBC # FLD: 18.4 % — HIGH (ref 10.3–14.5)
SAO2 % BLDV: 97.8 % — SIGNIFICANT CHANGE UP
SODIUM SERPL-SCNC: 142 MMOL/L — SIGNIFICANT CHANGE UP (ref 135–145)
WBC # BLD: 7.68 K/UL — SIGNIFICANT CHANGE UP (ref 3.8–10.5)
WBC # FLD AUTO: 7.68 K/UL — SIGNIFICANT CHANGE UP (ref 3.8–10.5)

## 2022-01-08 PROCEDURE — 71250 CT THORAX DX C-: CPT | Mod: 26

## 2022-01-08 PROCEDURE — 99233 SBSQ HOSP IP/OBS HIGH 50: CPT

## 2022-01-08 RX ADMIN — Medication 25 MILLIGRAM(S): at 05:45

## 2022-01-08 RX ADMIN — LIDOCAINE 1 PATCH: 4 CREAM TOPICAL at 21:45

## 2022-01-08 RX ADMIN — Medication 0.5 MILLIGRAM(S): at 22:18

## 2022-01-08 RX ADMIN — Medication 3 MILLILITER(S): at 22:18

## 2022-01-08 RX ADMIN — Medication 325 MILLIGRAM(S): at 13:28

## 2022-01-08 RX ADMIN — Medication 3 MILLILITER(S): at 15:39

## 2022-01-08 RX ADMIN — HEPARIN SODIUM 5000 UNIT(S): 5000 INJECTION INTRAVENOUS; SUBCUTANEOUS at 13:28

## 2022-01-08 RX ADMIN — Medication 25 MILLIGRAM(S): at 21:21

## 2022-01-08 RX ADMIN — PANTOPRAZOLE SODIUM 40 MILLIGRAM(S): 20 TABLET, DELAYED RELEASE ORAL at 05:45

## 2022-01-08 RX ADMIN — HEPARIN SODIUM 5000 UNIT(S): 5000 INJECTION INTRAVENOUS; SUBCUTANEOUS at 05:45

## 2022-01-08 RX ADMIN — Medication 50 MILLIGRAM(S): at 13:28

## 2022-01-08 RX ADMIN — Medication 25 MILLIGRAM(S): at 13:28

## 2022-01-08 RX ADMIN — Medication 40 MILLIGRAM(S): at 05:46

## 2022-01-08 RX ADMIN — Medication 0.5 MILLIGRAM(S): at 10:24

## 2022-01-08 RX ADMIN — Medication 100 MILLIGRAM(S): at 13:34

## 2022-01-08 RX ADMIN — Medication 1 SPRAY(S): at 10:08

## 2022-01-08 RX ADMIN — BUMETANIDE 1 MILLIGRAM(S): 0.25 INJECTION INTRAMUSCULAR; INTRAVENOUS at 05:46

## 2022-01-08 RX ADMIN — Medication 3 MILLILITER(S): at 03:19

## 2022-01-08 RX ADMIN — LIDOCAINE 1 PATCH: 4 CREAM TOPICAL at 05:51

## 2022-01-08 RX ADMIN — Medication 100 MILLIGRAM(S): at 05:46

## 2022-01-08 RX ADMIN — HEPARIN SODIUM 5000 UNIT(S): 5000 INJECTION INTRAVENOUS; SUBCUTANEOUS at 21:21

## 2022-01-08 RX ADMIN — Medication 100 MILLIGRAM(S): at 21:21

## 2022-01-08 RX ADMIN — Medication 3 MILLILITER(S): at 10:24

## 2022-01-08 RX ADMIN — LIDOCAINE 1 PATCH: 4 CREAM TOPICAL at 10:16

## 2022-01-08 RX ADMIN — SIMVASTATIN 10 MILLIGRAM(S): 20 TABLET, FILM COATED ORAL at 21:21

## 2022-01-08 NOTE — PROGRESS NOTE ADULT - SUBJECTIVE AND OBJECTIVE BOX
Jonas Frias MD  Interventional Cardiology / Advance Heart Failure and Cardiac Transplant Specialist  [place] Office : [address] [place] [address] [place]. [address]  Tel:   [place] Office : [address] [facility] [place] [address]  Tel: [phone]  Cell : [phone]    Pt is lying in bed comfortable not in distress, no chest pains some SOB no palpitations  	  MEDICATIONS:  buMETAnide 1 milliGRAM(s) Oral daily  heparin   Injectable 5000 Unit(s) SubCutaneous every 8 hours  hydrALAZINE 25 milliGRAM(s) Oral every 8 hours  metoprolol succinate ER 50 milliGRAM(s) Oral daily      albuterol/ipratropium for Nebulization 3 milliLiter(s) Nebulizer every 6 hours  benzonatate 100 milliGRAM(s) Oral every 8 hours  buDESOnide    Inhalation Suspension 0.5 milliGRAM(s) Inhalation two times a day    acetaminophen     Tablet .. 650 milliGRAM(s) Oral every 6 hours PRN    aluminum hydroxide/magnesium hydroxide/simethicone Suspension 30 milliLiter(s) Oral every 4 hours PRN  pantoprazole    Tablet 40 milliGRAM(s) Oral before breakfast    predniSONE   Tablet 40 milliGRAM(s) Oral daily  simvastatin 10 milliGRAM(s) Oral at bedtime    epoetin livia-epbx (RETACRIT) Injectable 16639 Unit(s) SubCutaneous once  ferrous    sulfate 325 milliGRAM(s) Oral daily  lidocaine   4% Patch 1 Patch Transdermal every 24 hours  oxymetazoline 0.05% Nasal Spray 2 Spray(s) Nasal every 12 hours PRN  sodium chloride 0.65% Nasal 1 Spray(s) Both Nostrils every 8 hours PRN      PAST MEDICAL/SURGICAL HISTORY  PAST MEDICAL & SURGICAL HISTORY:  Atrial fibrillation  S/P Ablation    Pulmonary hypertension    MIGUEL (obstructive sleep apnea)    COPD (chronic obstructive pulmonary disease)  on home O2    CHF (congestive heart failure)    HTN (hypertension)    Gout    Mitral valve replaced    Tricuspid valve replaced    CHF (congestive heart failure)    Hypertension    COPD (chronic obstructive pulmonary disease)    History of mitral valve replacement with mechanical valve    Tricuspid valve replaced  mechanical    No significant past surgical history        SOCIAL HISTORY: Substance Use (street drugs): ( x ) never used  (  ) other:    FAMILY HISTORY:  Family history of hypertension (Father, Sibling)    Family history of stroke    Family history of hypertension (Mother)         PHYSICAL EXAM:  T(C): 36.7 (01-08-22 @ 18:31), Max: 37 (01-08-22 @ 11:34)  HR: 81 (01-08-22 @ 18:31) (65 - 98)  BP: 144/74 (01-08-22 @ 18:31) (141/88 - 149/82)  RR: 18 (01-08-22 @ 18:31) (16 - 18)  SpO2: 98% (01-08-22 @ 18:31) (93% - 99%)  Wt(kg): --  I&O's Summary           EYES:   PERRLA   ENMT:   Moist mucous membranes, Good dentition, No lesions  Cardiovascular: Normal S1 S2, No JVD, No murmurs, No edema  Respiratory: b/l expiratory wheezing   Gastrointestinal:  Soft, Non-tender, + BS	  Extremities: no edema                                    9.5    7.68  )-----------( 279      ( 08 Jan 2022 07:52 )             33.9     01-08    142  |  103  |  98<H>  ----------------------------<  137<H>  4.5   |  31  |  1.46<H>    Ca    10.0      08 Jan 2022 07:52  Phos  2.5     01-08  Mg     2.50     01-08      proBNP:   Lipid Profile:   HgA1c:   TSH:     Consultant(s) Notes Reviewed:  [x ] YES  [ ] NO    Care Discussed with Consultants/Other Providers [ x] YES  [ ] NO    Imaging Personally Reviewed independently:  [x] YES  [ ] NO    All labs, radiologic studies, vitals, orders and medications list reviewed. Patient is seen and examined at bedside. Case discussed with medical team.

## 2022-01-08 NOTE — PROGRESS NOTE ADULT - ASSESSMENT
71y Female with history of COPD, CHF presents with dizziness. Nephrology consulted for elevated Scr.    1) MISAEL: secondary to HF and infection. Scr now improving off of diuretics with severe azotemia due to steroids. Restarting diuretics today 1/8.  UA active however contaminated and doubt patient with underlying GN. FeUrea low. Bladder scan negative. Avoid nephrotoxins.    2) CKD-3b: Baseline Scr 1.4-1.6. Outpatient CKD work up. Monitor electrolytes.    3) HTN with CKD: BP controlled. Continue with current medications. Monitor BP.    4) LE edema: Improving. Resumed bumex but at decreased dose of 1 mg PO daily today 1/8.   Elevated serum CO2 due to respiratory acidosis which is not compensated as per blood gas 1/7. Prior TTE with grossly normal LVSF. Monitor UO.    5) Anemia: Hb low with iron deficiency s/p venofer s/p Epo 10K X 1 dose on 12/30 and 1/7. Monitor Hb.    6) Hyperkalemia: Resolved. Continue with low K diet. Monitor serum K.      [facility] NEPHROLOGY  Rodrigue Amador M.D.  Rob Perez D.O.  nAa Song M.D.  Lucrecia López, MSN, ANP-C    Telephone: [phone]  Facsimile: [phone]    [address] [place] [address]

## 2022-01-08 NOTE — PROGRESS NOTE ADULT - ASSESSMENT
71 year old hx of HTN, dCHF, MV and TV replacement, MIGUEL not on CPAP due to unable to afford, afib s/p ablation not on AC due to chronic bleeding, unable to do c-scope or endoscopy due to medical conditions, COPD on 3 L NC, here for dizziness. x1 week, feeling generally weakness .    EKG - NSR RBBB  Tele - episodes of Afib   Echo in 9/21 shows normal LV s/p MVR     1) Acute on chronic diastolic CHF   - echo in 9/21 shows normal LV s/p MVR  - monitor urine out put, daily weights  -creat worsened, bumex hled, now creatnine improved      2) COPD   - on O2   - c/w steroids still wheezing   - f/u pulm     3) Afib  - not on AC, pt doesn't remember why , as per chart sec to GI bleed  - cont toprol increase to toprol to 50mg daily as pt having episodes of rapid afib brief     4) MISAEL  -f/u renal recs    5) DVT prophylaxis   - on sc heparin

## 2022-01-08 NOTE — PROGRESS NOTE ADULT - SUBJECTIVE AND OBJECTIVE BOX
Pulmonary Consult Note    DAMARI GUERRERO  MRN-[id number]    Chief Complaint: Patient is a 71y old  Female who presents with a chief complaint of dizziness and sOB (08 Jan 2022 11:17)      HPI:  71yFemale   -Appears fairly comfortable on O2 via nasal cannula  -  -  -    ROS:  -  -  All other systems reviewed and negative    PAST MEDICAL HISTORY: HEALTH ISSUES - PROBLEM Dx:  Chronic obstructive pulmonary disease, unspecified COPD type    Hypertension    CAD (coronary artery disease)    Prophylactic measure    Edema    High creatinine    Acute on chronic diastolic congestive heart failure            SOCIAL HISTORY:     ACTIVE MEDICATION LIST:  MEDICATIONS  (STANDING):  albuterol/ipratropium for Nebulization 3 milliLiter(s) Nebulizer every 6 hours  benzonatate 100 milliGRAM(s) Oral every 8 hours  buDESOnide    Inhalation Suspension 0.5 milliGRAM(s) Inhalation two times a day  buMETAnide 1 milliGRAM(s) Oral daily  epoetin livia-epbx (RETACRIT) Injectable 95878 Unit(s) SubCutaneous once  ferrous    sulfate 325 milliGRAM(s) Oral daily  heparin   Injectable 5000 Unit(s) SubCutaneous every 8 hours  hydrALAZINE 25 milliGRAM(s) Oral every 8 hours  lidocaine   4% Patch 1 Patch Transdermal every 24 hours  metoprolol succinate ER 50 milliGRAM(s) Oral daily  pantoprazole    Tablet 40 milliGRAM(s) Oral before breakfast  predniSONE   Tablet 40 milliGRAM(s) Oral daily  simvastatin 10 milliGRAM(s) Oral at bedtime    MEDICATIONS  (PRN):  acetaminophen     Tablet .. 650 milliGRAM(s) Oral every 6 hours PRN Moderate Pain (4 - 6)  aluminum hydroxide/magnesium hydroxide/simethicone Suspension 30 milliLiter(s) Oral every 4 hours PRN Dyspepsia  oxymetazoline 0.05% Nasal Spray 2 Spray(s) Nasal every 12 hours PRN Epistaxis  sodium chloride 0.65% Nasal 1 Spray(s) Both Nostrils every 8 hours PRN Nasal Congestion      EXAM:  Vital Signs Last 24 Hrs  T(C): 36.7 (08 Jan 2022 05:46), Max: 36.9 (07 Jan 2022 22:05)  T(F): 98.1 (08 Jan 2022 05:46), Max: 98.4 (07 Jan 2022 22:05)  HR: 72 (08 Jan 2022 07:35) (65 - 98)  BP: 144/77 (08 Jan 2022 05:46) (144/77 - 152/75)  BP(mean): --  RR: 18 (08 Jan 2022 05:46) (16 - 18)  SpO2: 96% (08 Jan 2022 07:35) (96% - 99%)  GENERAL: No acute distress  NEURO: Alert and oriented x 3  LUNGS: Clear to auscultation bilaterally, no rales or wheezes  CV: S1/S2, no murmur  ABDOMEN: +BS, nontender  EXTREMITIES: no clubbing, cyanosis, edema    Labs:                        9.5    7.68  )-----------( 279      ( 08 Jan 2022 07:52 )             33.9   01-08    142  |  103  |  98<H>  ----------------------------<  137<H>  4.5   |  31  |  1.46<H>    Ca    10.0      08 Jan 2022 07:52  Phos  2.5     01-08  Mg     2.50     01-08      PROBLEM LIST:  71yFemale with HEALTH ISSUES - PROBLEM Dx:  Chronic obstructive pulmonary disease, unspecified COPD type    +hMPV    Hypertension    CAD (coronary artery disease)    Prophylactic measure    Edema    High creatinine    Acute on chronic diastolic congestive heart failure              RECS:  -nebs atc,  pulmicort nebs bid  -solumedrol changed to prednisone yesterday bc pt did not like how it made her feel after getting IV dose.  Cont  prednisone taper, decrease by 10 mg every 3 days.  -Patient with chronic hypercarbic respiratory failure secondary to COPD/obesity, she would benefit from noninvasive ventilation QHS/during sleep while home to avoid further episodes of respiratory failure and rehospitalization.  -cont bipap PRN and during sleep  -supplemental o2,wean as tolerated, do not aim for sats of 100%  -incentive jovanny  -would try to diurese today, monitor kidney function      Thank you for this consultation, please feel free to call with any questions [phone]  Noemi De Leon, DO

## 2022-01-08 NOTE — PROGRESS NOTE ADULT - SUBJECTIVE AND OBJECTIVE BOX
[facility] NEPHROLOGY- PROGRESS NOTE    71y Female with history of COPD, CHF presents with dizziness. Nephrology consulted for elevated Scr.    REVIEW OF SYSTEMS:  Gen: no changes in weight  Cards: no chest pain  Resp: + dyspnea, +chest tightness  GI: no nausea or vomiting or diarrhea  Vascular: + LE edema improving    No Known Allergies      Hospital Medications: Medications reviewed      VITALS:  T(F): 98.1 (01-08-22 @ 05:46), Max: 98.4 (01-07-22 @ 22:05)  HR: 72 (01-08-22 @ 07:35)  BP: 144/77 (01-08-22 @ 05:46)  RR: 18 (01-08-22 @ 05:46)  SpO2: 96% (01-08-22 @ 07:35)  Wt(kg): --      PHYSICAL EXAM:  Gen: NAD, calm  Cards: RRR, +S1/S2, no M/G/R  Resp: wheezing throughout all lung fields, poor airway entry bilaterally, + tachypnea  GI: soft, NT/ND, NABS  Vascular: trace LE edema B/L        LABS:  01-08    142  |  103  |  98<H>  ----------------------------<  137<H>  4.5   |  31  |  1.46<H>    Ca    10.0      08 Jan 2022 07:52  Phos  2.5     01-08  Mg     2.50     01-08      Creatinine Trend: 1.46 <--, 1.65 <--, 1.95 <--, 2.34 <--, 1.81 <--, 1.82 <--                        9.5    7.68  )-----------( 279      ( 08 Jan 2022 07:52 )             33.9     Blood Gas Profile - Venous (01.08.22 @ 07:52)    pH, Venous: 7.35    pCO2, Venous: 63 mmHg    pO2, Venous: 85 mmHg    HCO3, Venous: 35 mmol/L    Base Excess, Venous: 7.0 mmol/L    Oxygen Saturation, Venous: 97.8 %    Total CO2, Venous: 36.7 mmol/L

## 2022-01-08 NOTE — PROGRESS NOTE ADULT - SUBJECTIVE AND OBJECTIVE BOX
SUBJECTIVE / OVERNIGHT EVENTS: pt seen and examined  still shortness of breath     MEDICATIONS  (STANDING):  albuterol/ipratropium for Nebulization 3 milliLiter(s) Nebulizer every 6 hours  benzonatate 100 milliGRAM(s) Oral every 8 hours  buDESOnide    Inhalation Suspension 0.5 milliGRAM(s) Inhalation two times a day  buMETAnide 1 milliGRAM(s) Oral daily  epoetin livia-epbx (RETACRIT) Injectable 58740 Unit(s) SubCutaneous once  ferrous    sulfate 325 milliGRAM(s) Oral daily  heparin   Injectable 5000 Unit(s) SubCutaneous every 8 hours  hydrALAZINE 25 milliGRAM(s) Oral every 8 hours  lidocaine   4% Patch 1 Patch Transdermal every 24 hours  metoprolol succinate ER 50 milliGRAM(s) Oral daily  pantoprazole    Tablet 40 milliGRAM(s) Oral before breakfast  predniSONE   Tablet 40 milliGRAM(s) Oral daily  simvastatin 10 milliGRAM(s) Oral at bedtime    MEDICATIONS  (PRN):  acetaminophen     Tablet .. 650 milliGRAM(s) Oral every 6 hours PRN Moderate Pain (4 - 6)  aluminum hydroxide/magnesium hydroxide/simethicone Suspension 30 milliLiter(s) Oral every 4 hours PRN Dyspepsia  oxymetazoline 0.05% Nasal Spray 2 Spray(s) Nasal every 12 hours PRN Epistaxis  sodium chloride 0.65% Nasal 1 Spray(s) Both Nostrils every 8 hours PRN Nasal Congestion    Vital Signs Last 24 Hrs  T(C): 36.8 (01-08-22 @ 21:21), Max: 37 (01-08-22 @ 11:34)  T(F): 98.2 (01-08-22 @ 21:21), Max: 98.6 (01-08-22 @ 11:34)  HR: 75 (01-08-22 @ 22:51) (65 - 98)  BP: 149/81 (01-08-22 @ 21:21) (141/88 - 149/82)  BP(mean): --  RR: 16 (01-08-22 @ 21:21) (16 - 18)  SpO2: 97% (01-08-22 @ 22:51) (93% - 99%)            Constitutional: No fever, fatigue  Skin: No rash.  Eyes: No recent vision problems or eye pain.  ENT: No congestion, ear pain, or sore throat.  Cardiovascular: No chest pain or palpation.  Respiratory: No cough, shortness of breath, congestion, or wheezing.  Gastrointestinal: No abdominal pain, nausea, vomiting, or diarrhea.  Genitourinary: No dysuria.  Musculoskeletal: No joint swelling.  Neurologic: No headache.    PHYSICAL EXAM:  GENERAL: NAD  EYES: EOMI, PERRLA  NECK: Supple, No JVD  CHEST/LUNG: dec breath sounds at bases, fine exp wheezing+  HEART:  S1 , S2 +  ABDOMEN: soft , bs+  EXTREMITIES: + edema   NEUROLOGY:alert awake    LABS:  01-08    142  |  103  |  98<H>  ----------------------------<  137<H>  4.5   |  31  |  1.46<H>    Ca    10.0      08 Jan 2022 07:52  Phos  2.5     01-08  Mg     2.50     01-08      Creatinine Trend: 1.46 <--, 1.65 <--, 1.95 <--, 2.34 <--, 1.81 <--, 1.82 <--                        9.5    7.68  )-----------( 279      ( 08 Jan 2022 07:52 )             33.9     Urine Studies:                        Creatinine, Random Urine: 153 mg/dL (12-26 @ 16:17)

## 2022-01-09 LAB
ANION GAP SERPL CALC-SCNC: 6 MMOL/L — LOW (ref 7–14)
BASE EXCESS BLDV CALC-SCNC: 8.7 MMOL/L — HIGH (ref -2–3)
BUN SERPL-MCNC: 80 MG/DL — HIGH (ref 7–23)
CALCIUM SERPL-MCNC: 10.1 MG/DL — SIGNIFICANT CHANGE UP (ref 8.4–10.5)
CHLORIDE SERPL-SCNC: 99 MMOL/L — SIGNIFICANT CHANGE UP (ref 98–107)
CO2 BLDV-SCNC: 39.3 MMOL/L — HIGH (ref 22–26)
CO2 SERPL-SCNC: 36 MMOL/L — HIGH (ref 22–31)
CREAT SERPL-MCNC: 1.34 MG/DL — HIGH (ref 0.5–1.3)
GLUCOSE SERPL-MCNC: 112 MG/DL — HIGH (ref 70–99)
HCO3 BLDV-SCNC: 37 MMOL/L — HIGH (ref 22–29)
HCT VFR BLD CALC: 32.8 % — LOW (ref 34.5–45)
HGB BLD-MCNC: 9 G/DL — LOW (ref 11.5–15.5)
MAGNESIUM SERPL-MCNC: 2.3 MG/DL — SIGNIFICANT CHANGE UP (ref 1.6–2.6)
MCHC RBC-ENTMCNC: 27.4 GM/DL — LOW (ref 32–36)
MCHC RBC-ENTMCNC: 27.5 PG — SIGNIFICANT CHANGE UP (ref 27–34)
MCV RBC AUTO: 100.3 FL — HIGH (ref 80–100)
NRBC # BLD: 0 /100 WBCS — SIGNIFICANT CHANGE UP
NRBC # FLD: 0 K/UL — SIGNIFICANT CHANGE UP
PCO2 BLDV: 69 MMHG — HIGH (ref 39–42)
PH BLDV: 7.34 — SIGNIFICANT CHANGE UP (ref 7.32–7.43)
PHOSPHATE SERPL-MCNC: 2.5 MG/DL — SIGNIFICANT CHANGE UP (ref 2.5–4.5)
PLATELET # BLD AUTO: 273 K/UL — SIGNIFICANT CHANGE UP (ref 150–400)
PO2 BLDV: 56 MMHG — SIGNIFICANT CHANGE UP
POTASSIUM SERPL-MCNC: 4.6 MMOL/L — SIGNIFICANT CHANGE UP (ref 3.5–5.3)
POTASSIUM SERPL-SCNC: 4.6 MMOL/L — SIGNIFICANT CHANGE UP (ref 3.5–5.3)
RBC # BLD: 3.27 M/UL — LOW (ref 3.8–5.2)
RBC # FLD: 18.6 % — HIGH (ref 10.3–14.5)
SAO2 % BLDV: 87.5 % — SIGNIFICANT CHANGE UP
SODIUM SERPL-SCNC: 141 MMOL/L — SIGNIFICANT CHANGE UP (ref 135–145)
WBC # BLD: 6.19 K/UL — SIGNIFICANT CHANGE UP (ref 3.8–10.5)
WBC # FLD AUTO: 6.19 K/UL — SIGNIFICANT CHANGE UP (ref 3.8–10.5)

## 2022-01-09 PROCEDURE — 99233 SBSQ HOSP IP/OBS HIGH 50: CPT

## 2022-01-09 RX ORDER — IPRATROPIUM/ALBUTEROL SULFATE 18-103MCG
3 AEROSOL WITH ADAPTER (GRAM) INHALATION ONCE
Refills: 0 | Status: COMPLETED | OUTPATIENT
Start: 2022-01-09 | End: 2022-01-09

## 2022-01-09 RX ADMIN — Medication 100 MILLIGRAM(S): at 21:42

## 2022-01-09 RX ADMIN — Medication 25 MILLIGRAM(S): at 21:42

## 2022-01-09 RX ADMIN — Medication 50 MILLIGRAM(S): at 13:44

## 2022-01-09 RX ADMIN — BUMETANIDE 1 MILLIGRAM(S): 0.25 INJECTION INTRAMUSCULAR; INTRAVENOUS at 05:56

## 2022-01-09 RX ADMIN — Medication 25 MILLIGRAM(S): at 05:56

## 2022-01-09 RX ADMIN — PANTOPRAZOLE SODIUM 40 MILLIGRAM(S): 20 TABLET, DELAYED RELEASE ORAL at 05:56

## 2022-01-09 RX ADMIN — Medication 3 MILLILITER(S): at 14:15

## 2022-01-09 RX ADMIN — HEPARIN SODIUM 5000 UNIT(S): 5000 INJECTION INTRAVENOUS; SUBCUTANEOUS at 05:57

## 2022-01-09 RX ADMIN — Medication 975 MILLIGRAM(S): at 10:35

## 2022-01-09 RX ADMIN — Medication 25 MILLIGRAM(S): at 13:33

## 2022-01-09 RX ADMIN — Medication 0.5 MILLIGRAM(S): at 21:18

## 2022-01-09 RX ADMIN — Medication 100 MILLIGRAM(S): at 05:56

## 2022-01-09 RX ADMIN — Medication 975 MILLIGRAM(S): at 16:07

## 2022-01-09 RX ADMIN — Medication 100 MILLIGRAM(S): at 13:44

## 2022-01-09 RX ADMIN — LIDOCAINE 1 PATCH: 4 CREAM TOPICAL at 06:02

## 2022-01-09 RX ADMIN — LIDOCAINE 1 PATCH: 4 CREAM TOPICAL at 10:35

## 2022-01-09 RX ADMIN — HEPARIN SODIUM 5000 UNIT(S): 5000 INJECTION INTRAVENOUS; SUBCUTANEOUS at 13:35

## 2022-01-09 RX ADMIN — Medication 3 MILLILITER(S): at 10:51

## 2022-01-09 RX ADMIN — SIMVASTATIN 10 MILLIGRAM(S): 20 TABLET, FILM COATED ORAL at 21:42

## 2022-01-09 RX ADMIN — Medication 40 MILLIGRAM(S): at 05:56

## 2022-01-09 RX ADMIN — Medication 650 MILLIGRAM(S): at 10:35

## 2022-01-09 RX ADMIN — HEPARIN SODIUM 5000 UNIT(S): 5000 INJECTION INTRAVENOUS; SUBCUTANEOUS at 21:41

## 2022-01-09 RX ADMIN — ERYTHROPOIETIN 10000 UNIT(S): 10000 INJECTION, SOLUTION INTRAVENOUS; SUBCUTANEOUS at 17:12

## 2022-01-09 RX ADMIN — Medication 3 MILLILITER(S): at 21:10

## 2022-01-09 RX ADMIN — Medication 650 MILLIGRAM(S): at 09:38

## 2022-01-09 RX ADMIN — Medication 325 MILLIGRAM(S): at 13:30

## 2022-01-09 RX ADMIN — Medication 0.5 MILLIGRAM(S): at 10:52

## 2022-01-09 RX ADMIN — Medication 3 MILLILITER(S): at 03:35

## 2022-01-09 RX ADMIN — Medication 3 MILLILITER(S): at 14:13

## 2022-01-09 RX ADMIN — LIDOCAINE 1 PATCH: 4 CREAM TOPICAL at 21:42

## 2022-01-09 NOTE — PROGRESS NOTE ADULT - ASSESSMENT
71 year old hx of HTN, dCHF, MV and TV replacement, MIGUEL not on CPAP due to unable to afford, afib s/p ablation not on AC due to chronic bleeding, unable to do c-scope or endoscopy due to medical conditions, COPD on 3 L NC, here for dizziness. x1 week, feeling generally weakness .    EKG - NSR RBBB  Tele - episodes of Afib   Echo in 9/21 shows normal LV s/p MVR     1) Acute on chronic diastolic CHF   - echo in 9/21 shows normal LV s/p MVR  - monitor urine out put, daily weights  -creat worsened, bumex held, now creatnine improved  - back on bumex 1mg daily       2) COPD   - on O2   - c/w steroids still wheezing   - f/u pulm     3) Afib  - not on AC, pt doesn't remember why , as per chart sec to GI bleed  - cont toprol increase to toprol to 50mg daily as pt having episodes of rapid afib brief     4) MISAEL  -f/u renal recs    5) DVT prophylaxis   - on sc heparin

## 2022-01-09 NOTE — PROVIDER CONTACT NOTE (OTHER) - DATE AND TIME:
01-Jan-2022 12:00
02-Jan-2022 10:41
09-Jan-2022 08:25
05-Jan-2022 11:49
23-Dec-2021 21:23
03-Jan-2022 10:27
26-Dec-2021 02:05

## 2022-01-09 NOTE — PROVIDER CONTACT NOTE (OTHER) - ASSESSMENT
Patient sleeping, when awoken patient denies chest pain.
Patient AOx4. Asymptomatic. VS documented in EMAR.
Vitals as charted. Pt asymptomatic.
Patient having nosebleed
O2 sat on 2 L is 98.
Orthostatic BP 92/52 lying and 79/60 sitting. Pt unable to tolerate standing due to feeling dizzy. GERI Wu notified.

## 2022-01-09 NOTE — PROGRESS NOTE ADULT - ASSESSMENT
71y Female with history of COPD, CHF presents with dizziness. Nephrology consulted for elevated Scr.    1) MISAEL: secondary to HF and infection. Scr now improving off of diuretics with severe azotemia due to steroids. Restarting diuretics today 1/8.  UA active however contaminated and doubt patient with underlying GN. FeUrea low. Bladder scan negative. Avoid nephrotoxins.    2) CKD-3b: Baseline Scr 1.4-1.6. Outpatient CKD work up. Monitor electrolytes.    3) HTN with CKD: BP controlled. Continue with current medications. Monitor BP.    4) LE edema: Improving. Resumed bumex but at decreased dose of 1 mg PO daily today 1/8.   Elevated serum CO2 due to respiratory acidosis which is not compensated as per blood gas 1/7. Prior TTE with grossly normal LVSF. Monitor UO.    5) Anemia: Hb low with iron deficiency s/p venofer s/p Epo 10K X 1 dose on 12/30 and 1/7. Monitor Hb.    6) Hyperkalemia: Resolved. Continue with low K diet. Monitor serum K.      [facility] NEPHROLOGY  Rodrigue Amador M.D.  Rob Perez D.O.  Ana Song M.D.  Lucrecia López, MSN, ANP-C    Telephone: [phone]  Facsimile: [phone]    [address] [place] [address]       71y Female with history of COPD, CHF presents with dizziness. Nephrology consulted for elevated Scr.    1) MISAEL: secondary to HF and infection. Scr improved off of diuretics with severe azotemia due to steroids. Restarted diuretics 1/8 and renal fxn has remained stable.  UA active however contaminated and doubt patient with underlying GN. FeUrea low. Bladder scan negative. Avoid nephrotoxins.    2) CKD-3b: Baseline Scr 1.4-1.6. Outpatient CKD work up. Monitor electrolytes.    3) HTN with CKD: BP controlled. Continue with current medications. Monitor BP.    4) LE edema: Improving. Resumed bumex but at decreased dose of 1 mg PO daily today 1/8.   Elevated serum CO2 due to respiratory acidosis which is not compensated as per blood gas 1/7. Prior TTE with grossly normal LVSF. Monitor UO.    5) Anemia: Hb low with iron deficiency s/p venofer s/p Epo 10K X 1 dose on 12/30 and 1/7. Monitor Hb.    6) Hyperkalemia: Resolved. Continue with low K diet. Monitor serum K.    7) Renal lesion suspicious for RCC.  Once pt stable, would consult urology.  However, given severity of pt's disease and risk of poor outcomes with intubation, would not recommend excision of lesion at this time.  It is possible that prognosis of RCC would be good in the short term and pt's other illnesses are more likely to be life-limiting than an RCC.      [facility] NEPHROLOGY  Rodrigue Amador M.D.  Rob Perez D.O.  Ana Song M.D.  Lucrecia López, ALLISON, ANP-C    Telephone: [phone]  Facsimile: [phone]    [address] [place] [address]

## 2022-01-09 NOTE — PROVIDER CONTACT NOTE (OTHER) - REASON
Elevated Heart Rate to 150s
Patient having nosebleed
cough
Orthostatic BP
PAT x2 on tele monitoring; epistaxis from right nostril
Tachy/Maxim
hematemesis and melena

## 2022-01-09 NOTE — PROVIDER CONTACT NOTE (OTHER) - SITUATION
Pt Heart rate elevated to 150s.
Pt Hr went up to 160s while he was vomiting.   Pt had blood in vomit. Currently Hr at 120 on tele.  ACP was at bedside and visibly saw black tarry stool.
PA notified of Orthostatic BP after pt felt dizzy s/p PT
2 events of PAT within few minutes apart  1st PAT 5.77 sec HR up to 135  2nd PAT 14 sec HR up to 135  with wide QRST complex  Epistaxis from right nostril - pressure applied. Bleeding stopped.
Patient having nosebleed
Patient having episodes of tachycardia to 120s and then dropping to ofelia 40s, unsustained, with returning to 70s.
Pt having dry cough.

## 2022-01-09 NOTE — PROGRESS NOTE ADULT - SUBJECTIVE AND OBJECTIVE BOX
SUBJECTIVE / OVERNIGHT EVENTS: pt seen and examined  still shortness of breath     MEDICATIONS  (STANDING):  albuterol/ipratropium for Nebulization 3 milliLiter(s) Nebulizer every 6 hours  benzonatate 100 milliGRAM(s) Oral every 8 hours  buDESOnide    Inhalation Suspension 0.5 milliGRAM(s) Inhalation two times a day  buMETAnide 1 milliGRAM(s) Oral daily  ferrous    sulfate 325 milliGRAM(s) Oral daily  heparin   Injectable 5000 Unit(s) SubCutaneous every 8 hours  hydrALAZINE 25 milliGRAM(s) Oral every 8 hours  lidocaine   4% Patch 1 Patch Transdermal every 24 hours  metoprolol succinate ER 50 milliGRAM(s) Oral daily  pantoprazole    Tablet 40 milliGRAM(s) Oral before breakfast  predniSONE   Tablet   Oral   simvastatin 10 milliGRAM(s) Oral at bedtime    MEDICATIONS  (PRN):  acetaminophen     Tablet .. 650 milliGRAM(s) Oral every 6 hours PRN Moderate Pain (4 - 6)  aluminum hydroxide/magnesium hydroxide/simethicone Suspension 30 milliLiter(s) Oral every 4 hours PRN Dyspepsia  oxymetazoline 0.05% Nasal Spray 2 Spray(s) Nasal every 12 hours PRN Epistaxis  sodium chloride 0.65% Nasal 1 Spray(s) Both Nostrils every 8 hours PRN Nasal Congestion    Vital Signs Last 24 Hrs  T(C): 36.4 (01-09-22 @ 13:19), Max: 36.8 (01-08-22 @ 21:21)  T(F): 97.5 (01-09-22 @ 13:19), Max: 98.2 (01-08-22 @ 21:21)  HR: 75 (01-09-22 @ 16:15) (60 - 82)  BP: 156/86 (01-09-22 @ 13:19) (129/81 - 156/86)  BP(mean): --  RR: 17 (01-09-22 @ 13:19) (16 - 18)  SpO2: 93% (01-09-22 @ 16:15) (93% - 100%)          Constitutional: No fever, fatigue  Skin: No rash.  Eyes: No recent vision problems or eye pain.  ENT: No congestion, ear pain, or sore throat.  Cardiovascular: No chest pain or palpation.  Respiratory: No cough, shortness of breath, congestion, or wheezing.  Gastrointestinal: No abdominal pain, nausea, vomiting, or diarrhea.  Genitourinary: No dysuria.  Musculoskeletal: No joint swelling.  Neurologic: No headache.    PHYSICAL EXAM:  GENERAL: NAD  EYES: EOMI, PERRLA  NECK: Supple, No JVD  CHEST/LUNG: dec breath sounds at bases, fine exp wheezing+  HEART:  S1 , S2 +  ABDOMEN: soft , bs+  EXTREMITIES: + edema   NEUROLOGY:alert awake    LABS:  01-09    141  |  99  |  80<H>  ----------------------------<  112<H>  4.6   |  36<H>  |  1.34<H>    Ca    10.1      09 Jan 2022 08:02  Phos  2.5     01-09  Mg     2.30     01-09      Creatinine Trend: 1.34 <--, 1.46 <--, 1.65 <--, 1.95 <--, 2.34 <--, 1.81 <--, 1.82 <--                        9.0    6.19  )-----------( 273      ( 09 Jan 2022 08:02 )             32.8     Urine Studies:                              Creatinine, Random Urine: 153 mg/dL (12-26 @ 16:17)

## 2022-01-09 NOTE — PROGRESS NOTE ADULT - SUBJECTIVE AND OBJECTIVE BOX
[facility] NEPHROLOGY- PROGRESS NOTE    71y Female with history of COPD, CHF presents with dizziness. Nephrology consulted for elevated Scr.    REVIEW OF SYSTEMS:  Gen: no changes in weight  Cards: no chest pain  Resp: + dyspnea, +chest tightness  GI: no nausea or vomiting or diarrhea  Vascular: + LE edema improving    No Known Allergies      Hospital Medications: Medications reviewed      VITALS:  T(F): 97.5 (01-09-22 @ 13:19), Max: 98.2 (01-08-22 @ 21:21)  HR: 75 (01-09-22 @ 16:15)  BP: 156/86 (01-09-22 @ 13:19)  RR: 17 (01-09-22 @ 13:19)  SpO2: 93% (01-09-22 @ 16:15)          PHYSICAL EXAM:  Gen: NAD, calm  Cards: RRR, +S1/S2, no M/G/R  Resp: wheezing throughout all lung fields, poor airway entry bilaterally, + tachypnea  GI: soft, NT/ND, NABS  Vascular: trace LE edema B/L        LABS:  01-08    142  |  103  |  98<H>  ----------------------------<  137<H>  4.5   |  31  |  1.46<H>    Ca    10.0      08 Jan 2022 07:52  Phos  2.5     01-08  Mg     2.50     01-08      Creatinine Trend: 1.46 <--, 1.65 <--, 1.95 <--, 2.34 <--, 1.81 <--, 1.82 <--                        9.5    7.68  )-----------( 279      ( 08 Jan 2022 07:52 )             33.9     Blood Gas Profile - Venous (01.08.22 @ 07:52)    pH, Venous: 7.35    pCO2, Venous: 63 mmHg    pO2, Venous: 85 mmHg    HCO3, Venous: 35 mmol/L    Base Excess, Venous: 7.0 mmol/L    Oxygen Saturation, Venous: 97.8 %    Total CO2, Venous: 36.7 mmol/L                       [facility] NEPHROLOGY- PROGRESS NOTE    71y Female with history of COPD, CHF presents with dizziness. Nephrology consulted for elevated Scr.    REVIEW OF SYSTEMS:  Gen: no changes in weight  Cards: no chest pain  Resp: + dyspnea, +chest tightness  GI: no nausea or vomiting or diarrhea  Vascular: + LE edema improving    No Known Allergies      Hospital Medications: Medications reviewed      VITALS:  T(F): 97.5 (01-09-22 @ 13:19), Max: 98.2 (01-08-22 @ 21:21)  HR: 75 (01-09-22 @ 16:15)  BP: 156/86 (01-09-22 @ 13:19)  RR: 17 (01-09-22 @ 13:19)  SpO2: 93% (01-09-22 @ 16:15)          PHYSICAL EXAM:  Gen: NAD, calm  Cards: RRR, +S1/S2, no M/G/R  Resp: wheezing throughout all lung fields, poor airway entry bilaterally, + tachypnea  GI: soft, NT/ND, NABS  Vascular: trace LE edema B/L        LABS:  01-09    141  |  99  |  80<H>  ----------------------------<  112<H>  4.6   |  36<H>  |  1.34<H>    Ca    10.1      09 Jan 2022 08:02  Phos  2.5     01-09  Mg     2.30     01-09      Creatinine Trend: 1.34 <--, 1.46 <--, 1.65 <--, 1.95 <--, 2.34 <--, 1.81 <--, 1.82 <--                        9.0    6.19  )-----------( 273      ( 09 Jan 2022 08:02 )             32.8     Blood Gas Profile - Venous (01.09.22 @ 08:02)    pH, Venous: 7.34    pCO2, Venous: 69 mmHg    pO2, Venous: 56 mmHg    HCO3, Venous: 37 mmol/L    Base Excess, Venous: 8.7 mmol/L    Oxygen Saturation, Venous: 87.5 %    Total CO2, Venous: 39.3 mmol/L    Radiology:  < from: CT Chest No Cont (01.08.22 @ 13:17) >    ACC: [id number] EXAM:  CT CHEST                          PROCEDURE DATE:  01/08/2022          INTERPRETATION:  CLINICAL INFORMATION: Shortness of breath.    COMPARISON: CT abdomen and pelvis 4/12/2021. Ultrasound kidneys and   bladder 4/6/2021.. CT chest 3/12/2009    CONTRAST/COMPLICATIONS:  IV Contrast: NONE  Oral Contrast: NONE  Complications: None reported at time of study completion    PROCEDURE:  CT of the Chest was performed.  Sagittal and coronal reformats were performed.    FINDINGS:    LUNGS AND AIRWAYS: Patent central airways.  Mosaic attenuation. Unchanged   left apical ill-defined linear opacities. Right upper lobe 3 mm nodule   (2, 51).  PLEURA: No pleural effusion.  MEDIASTINUM AND CARMELA: No lymphadenopathy.  VESSELS: Within normal limits.  HEART: Cardiomegaly. No pericardial effusion. Status post mitral and   tricuspid valve repair.  CHEST WALL AND LOWER NECK: Status post median sternotomy. Retained   epicardial pacer wires.  VISUALIZED UPPER ABDOMEN: Redemonstrated 3.5 cm left upper pole renal   mass.  BONES: Within normal limits.    IMPRESSION:  1.  Mosaic attenuation. Unchanged left apical ill-defined linear   opacities.  2.  Right upper lobe 2 mm nodule, new from the prior study and   indeterminate  3.  Redemonstrated 3.5 cm left upper pole renal mass concerning for   neoplasm. Recommend further evaluation with contrast-enhanced MR abdomen.        --- End of Report ---          JOHAN MOYER MD; Resident Radiologist  This document has been electronically signed.  YSABEL LE MD; Attending Radiologist  This document has been electronically signed. Jan 8 2022  1:48PM    < end of copied text >

## 2022-01-09 NOTE — PROGRESS NOTE ADULT - SUBJECTIVE AND OBJECTIVE BOX
Called to see patient for shortness of breath complaints. Patient states she has had ongoing shortness of breath, but it is worse right now.   VS stable  Heart- RRR s1s2 nl  Lungs- expiratory wheezes upper and lower lung fields, R> L  abd - soft NT  Extrem- no cyanosis  A/P- will offer duoneb for wheezing         Patient to be placed back on bipap for now to help alleviate shortness of breath symptoms         continue to monitor

## 2022-01-09 NOTE — PROVIDER CONTACT NOTE (OTHER) - ACTION/TREATMENT ORDERED:
Pt received ordered  calcium, insulin and dextrose.
no orders at this time
PA made aware. Hold 2PM heparin subq.
Heart rate now in 60s. will continue to monitor
Provider made aware.
EKG to be completed,

## 2022-01-09 NOTE — PROGRESS NOTE ADULT - SUBJECTIVE AND OBJECTIVE BOX
Jonas Frias MD  Interventional Cardiology / Advance Heart Failure and Cardiac Transplant Specialist  [place] Office : [address] [place] [address] [place]. [address]  Tel:   [place] Office : [address] [facility] [place] [address]  Tel: [phone]  Cell : [phone]    Pt is lying in bed comfortable not in distress, no chest pains some SOB no palpitations  	  MEDICATIONS:  buMETAnide 1 milliGRAM(s) Oral daily  heparin   Injectable 5000 Unit(s) SubCutaneous every 8 hours  hydrALAZINE 25 milliGRAM(s) Oral every 8 hours  metoprolol succinate ER 50 milliGRAM(s) Oral daily      albuterol/ipratropium for Nebulization 3 milliLiter(s) Nebulizer every 6 hours  benzonatate 100 milliGRAM(s) Oral every 8 hours  buDESOnide    Inhalation Suspension 0.5 milliGRAM(s) Inhalation two times a day    acetaminophen     Tablet .. 650 milliGRAM(s) Oral every 6 hours PRN    aluminum hydroxide/magnesium hydroxide/simethicone Suspension 30 milliLiter(s) Oral every 4 hours PRN  pantoprazole    Tablet 40 milliGRAM(s) Oral before breakfast    predniSONE   Tablet   Oral   simvastatin 10 milliGRAM(s) Oral at bedtime    epoetin livia-epbx (RETACRIT) Injectable 28230 Unit(s) SubCutaneous once  ferrous    sulfate 325 milliGRAM(s) Oral daily  lidocaine   4% Patch 1 Patch Transdermal every 24 hours  oxymetazoline 0.05% Nasal Spray 2 Spray(s) Nasal every 12 hours PRN  sodium chloride 0.65% Nasal 1 Spray(s) Both Nostrils every 8 hours PRN      PAST MEDICAL/SURGICAL HISTORY  PAST MEDICAL & SURGICAL HISTORY:  Atrial fibrillation  S/P Ablation    Pulmonary hypertension    MIGUEL (obstructive sleep apnea)    COPD (chronic obstructive pulmonary disease)  on home O2    CHF (congestive heart failure)    HTN (hypertension)    Gout    Mitral valve replaced    Tricuspid valve replaced    CHF (congestive heart failure)    Hypertension    COPD (chronic obstructive pulmonary disease)    History of mitral valve replacement with mechanical valve    Tricuspid valve replaced  mechanical    No significant past surgical history        SOCIAL HISTORY: Substance Use (street drugs): ( x ) never used  (  ) other:    FAMILY HISTORY:  Family history of hypertension (Father, Sibling)    Family history of stroke    Family history of hypertension (Mother)            PHYSICAL EXAM:  T(C): 36.5 (01-09-22 @ 08:20), Max: 36.8 (01-08-22 @ 21:21)  HR: 70 (01-09-22 @ 11:24) (60 - 82)  BP: 138/62 (01-09-22 @ 08:20) (129/81 - 149/81)  RR: 18 (01-09-22 @ 08:20) (16 - 18)  SpO2: 96% (01-09-22 @ 11:24) (93% - 100%)  Wt(kg): --  I&O's Summary           EYES:   PERRLA   ENMT:   Moist mucous membranes, Good dentition, No lesions  Cardiovascular: Normal S1 S2, No JVD, No murmurs, No edema  Respiratory:  b/l wheezing   Gastrointestinal:  Soft, Non-tender, + BS	  Extremities: no edema                                    9.0    6.19  )-----------( 273      ( 09 Jan 2022 08:02 )             32.8     01-09    141  |  99  |  80<H>  ----------------------------<  112<H>  4.6   |  36<H>  |  1.34<H>    Ca    10.1      09 Jan 2022 08:02  Phos  2.5     01-09  Mg     2.30     01-09      proBNP:   Lipid Profile:   HgA1c:   TSH:     Consultant(s) Notes Reviewed:  [x ] YES  [ ] NO    Care Discussed with Consultants/Other Providers [ x] YES  [ ] NO    Imaging Personally Reviewed independently:  [x] YES  [ ] NO    All labs, radiologic studies, vitals, orders and medications list reviewed. Patient is seen and examined at bedside. Case discussed with medical team.

## 2022-01-09 NOTE — PROGRESS NOTE ADULT - SUBJECTIVE AND OBJECTIVE BOX
PULMONARY PROGRESS NOTE    DAMARI GUERRERO  MRN-[id number]    Patient is a 71y old  Female who presents with a chief complaint of dizziness and SOB (08 Jan 2022 11:40)      HPI:  -Appears comfortable. NAD  -    ROS:   -    ACTIVE MEDICATION LIST:  MEDICATIONS  (STANDING):  albuterol/ipratropium for Nebulization 3 milliLiter(s) Nebulizer every 6 hours  benzonatate 100 milliGRAM(s) Oral every 8 hours  buDESOnide    Inhalation Suspension 0.5 milliGRAM(s) Inhalation two times a day  buMETAnide 1 milliGRAM(s) Oral daily  epoetin livia-epbx (RETACRIT) Injectable 52669 Unit(s) SubCutaneous once  ferrous    sulfate 325 milliGRAM(s) Oral daily  heparin   Injectable 5000 Unit(s) SubCutaneous every 8 hours  hydrALAZINE 25 milliGRAM(s) Oral every 8 hours  lidocaine   4% Patch 1 Patch Transdermal every 24 hours  metoprolol succinate ER 50 milliGRAM(s) Oral daily  pantoprazole    Tablet 40 milliGRAM(s) Oral before breakfast  predniSONE   Tablet   Oral   simvastatin 10 milliGRAM(s) Oral at bedtime    MEDICATIONS  (PRN):  acetaminophen     Tablet .. 650 milliGRAM(s) Oral every 6 hours PRN Moderate Pain (4 - 6)  aluminum hydroxide/magnesium hydroxide/simethicone Suspension 30 milliLiter(s) Oral every 4 hours PRN Dyspepsia  oxymetazoline 0.05% Nasal Spray 2 Spray(s) Nasal every 12 hours PRN Epistaxis  sodium chloride 0.65% Nasal 1 Spray(s) Both Nostrils every 8 hours PRN Nasal Congestion      EXAM:  Vital Signs Last 24 Hrs  T(C): 36.6 (09 Jan 2022 05:56), Max: 37 (08 Jan 2022 11:34)  T(F): 97.8 (09 Jan 2022 05:56), Max: 98.6 (08 Jan 2022 11:34)  HR: 68 (09 Jan 2022 07:20) (60 - 88)  BP: 129/81 (09 Jan 2022 05:56) (129/81 - 149/82)  BP(mean): --  RR: 16 (09 Jan 2022 05:56) (16 - 18)  SpO2: 94% (09 Jan 2022 07:20) (93% - 100%)    GENERAL: The patient is awake and alert in no apparent distress.     LUNGS: Clear to auscultation without wheezing, rales or rhonchi; respirations unlabored    HEART: Regular rate and rhythm without murmur.       Labs:                     9.0    6.19  )-----------( 273      ( 09 Jan 2022 08:02 )             32.8   01-09    141  |  99  |  80<H>  ----------------------------<  112<H>  4.6   |  36<H>  |  1.34<H>    Ca    10.1      09 Jan 2022 08:02  Phos  2.5     01-09  Mg     2.30     01-09      PROBLEM LIST:  71y Female with HEALTH ISSUES - PROBLEM Dx:  Chronic obstructive pulmonary disease, unspecified COPD type    hMPV infection    Hypertension    CAD (coronary artery disease)    Prophylactic measure    Edema    High creatinine    Acute on chronic diastolic congestive heart failure              RECS:  -nebs atc,  pulmicort nebs bid  -Continue prednisone taper, decrease by 10 mg every 3 days.  -Patient with chronic hypercarbic respiratory failure secondary to COPD/obesity, she would benefit from noninvasive ventilation QHS/during sleep while home to avoid further episodes of respiratory failure and rehospitalization.  -cont bipap PRN and during sleep  -supplemental o2,wean as tolerated, do not aim for sats of 100%  -incentive jovanny  -Diuresis, monitor kidney function        Noemi De Leon DO  [phone]

## 2022-01-10 LAB
BASE EXCESS BLDA CALC-SCNC: 13.3 MMOL/L — HIGH (ref -2–3)
CO2 BLDA-SCNC: 41 MMOL/L — HIGH (ref 19–24)
HCO3 BLDA-SCNC: 39 MMOL/L — HIGH (ref 21–28)
PCO2 BLDA: 54 MMHG — HIGH (ref 32–35)
PH BLDA: 7.47 — HIGH (ref 7.35–7.45)
PO2 BLDA: 136 MMHG — HIGH (ref 83–108)
SAO2 % BLDA: 99.6 % — HIGH (ref 94–98)

## 2022-01-10 PROCEDURE — 99232 SBSQ HOSP IP/OBS MODERATE 35: CPT

## 2022-01-10 RX ORDER — BUMETANIDE 0.25 MG/ML
1 INJECTION INTRAMUSCULAR; INTRAVENOUS DAILY
Refills: 0 | Status: DISCONTINUED | OUTPATIENT
Start: 2022-01-11 | End: 2022-01-11

## 2022-01-10 RX ADMIN — SIMVASTATIN 10 MILLIGRAM(S): 20 TABLET, FILM COATED ORAL at 22:03

## 2022-01-10 RX ADMIN — Medication 100 MILLIGRAM(S): at 22:02

## 2022-01-10 RX ADMIN — LIDOCAINE 1 PATCH: 4 CREAM TOPICAL at 22:00

## 2022-01-10 RX ADMIN — Medication 0.5 MILLIGRAM(S): at 10:24

## 2022-01-10 RX ADMIN — Medication 3 MILLILITER(S): at 15:18

## 2022-01-10 RX ADMIN — PANTOPRAZOLE SODIUM 40 MILLIGRAM(S): 20 TABLET, DELAYED RELEASE ORAL at 05:19

## 2022-01-10 RX ADMIN — Medication 30 MILLIGRAM(S): at 05:19

## 2022-01-10 RX ADMIN — Medication 0.5 MILLIGRAM(S): at 21:08

## 2022-01-10 RX ADMIN — HEPARIN SODIUM 5000 UNIT(S): 5000 INJECTION INTRAVENOUS; SUBCUTANEOUS at 22:03

## 2022-01-10 RX ADMIN — HEPARIN SODIUM 5000 UNIT(S): 5000 INJECTION INTRAVENOUS; SUBCUTANEOUS at 05:18

## 2022-01-10 RX ADMIN — LIDOCAINE 1 PATCH: 4 CREAM TOPICAL at 05:34

## 2022-01-10 RX ADMIN — LIDOCAINE 1 PATCH: 4 CREAM TOPICAL at 09:00

## 2022-01-10 RX ADMIN — Medication 100 MILLIGRAM(S): at 14:11

## 2022-01-10 RX ADMIN — Medication 100 MILLIGRAM(S): at 05:19

## 2022-01-10 RX ADMIN — Medication 3 MILLILITER(S): at 03:27

## 2022-01-10 RX ADMIN — BUMETANIDE 1 MILLIGRAM(S): 0.25 INJECTION INTRAMUSCULAR; INTRAVENOUS at 05:19

## 2022-01-10 RX ADMIN — Medication 25 MILLIGRAM(S): at 22:02

## 2022-01-10 RX ADMIN — HEPARIN SODIUM 5000 UNIT(S): 5000 INJECTION INTRAVENOUS; SUBCUTANEOUS at 14:10

## 2022-01-10 RX ADMIN — Medication 25 MILLIGRAM(S): at 14:11

## 2022-01-10 RX ADMIN — Medication 50 MILLIGRAM(S): at 14:11

## 2022-01-10 RX ADMIN — Medication 25 MILLIGRAM(S): at 05:19

## 2022-01-10 RX ADMIN — Medication 325 MILLIGRAM(S): at 14:11

## 2022-01-10 RX ADMIN — Medication 3 MILLILITER(S): at 10:24

## 2022-01-10 RX ADMIN — Medication 3 MILLILITER(S): at 21:07

## 2022-01-10 NOTE — PROGRESS NOTE ADULT - SUBJECTIVE AND OBJECTIVE BOX
PULMONARY PROGRESS NOTE    DAMARI GUERRERO  MRN-[id number]    Patient is a 71y old  Female who presents with a chief complaint of dizziness and sOB (02 Jan 2022 10:47)      HPI:  still sob      MEDICATIONS  (STANDING):  albuterol/ipratropium for Nebulization 3 milliLiter(s) Nebulizer every 6 hours  benzonatate 100 milliGRAM(s) Oral every 8 hours  buDESOnide    Inhalation Suspension 0.5 milliGRAM(s) Inhalation two times a day  ferrous    sulfate 325 milliGRAM(s) Oral daily  heparin   Injectable 5000 Unit(s) SubCutaneous every 8 hours  hydrALAZINE 25 milliGRAM(s) Oral every 8 hours  lidocaine   4% Patch 1 Patch Transdermal every 24 hours  metoprolol succinate ER 50 milliGRAM(s) Oral daily  pantoprazole    Tablet 40 milliGRAM(s) Oral before breakfast  predniSONE   Tablet   Oral   predniSONE   Tablet 30 milliGRAM(s) Oral daily  simvastatin 10 milliGRAM(s) Oral at bedtime    MEDICATIONS  (PRN):  acetaminophen     Tablet .. 650 milliGRAM(s) Oral every 6 hours PRN Moderate Pain (4 - 6)  aluminum hydroxide/magnesium hydroxide/simethicone Suspension 30 milliLiter(s) Oral every 4 hours PRN Dyspepsia  oxymetazoline 0.05% Nasal Spray 2 Spray(s) Nasal every 12 hours PRN Epistaxis  sodium chloride 0.65% Nasal 1 Spray(s) Both Nostrils every 8 hours PRN Nasal Congestion          EXAM:  Vital Signs Last 24 Hrs  T(C): 36.8 (10 Ruddy 2022 14:15), Max: 37.1 (10 Ruddy 2022 05:19)  T(F): 98.2 (10 Ruddy 2022 14:15), Max: 98.7 (10 Ruddy 2022 05:19)  HR: 79 (10 Ruddy 2022 14:15) (68 - 93)  BP: 158/74 (10 Ruddy 2022 14:15) (128/64 - 158/74)  BP(mean): --  RR: 17 (10 Ruddy 2022 14:15) (16 - 17)  SpO2: 98% (10 Ruddy 2022 14:15) (93% - 100%)  GENERAL: The patient is awake and alert   LUNGS: poor insp effort, crackles bases                                       9.0    6.19  )-----------( 273      ( 09 Jan 2022 08:02 )             32.8   01-09    141  |  99  |  80<H>  ----------------------------<  112<H>  4.6   |  36<H>  |  1.34<H>    Ca    10.1      09 Jan 2022 08:02  Phos  2.5     01-09  Mg     2.30     01-09      < from: CT Chest No Cont (01.08.22 @ 13:17) >    ACC: [id number] EXAM:  CT CHEST                          PROCEDURE DATE:  01/08/2022          INTERPRETATION:  CLINICAL INFORMATION: Shortness of breath.    COMPARISON: CT abdomen and pelvis 4/12/2021. Ultrasound kidneys and   bladder 4/6/2021.. CT chest 3/12/2009    CONTRAST/COMPLICATIONS:  IV Contrast: NONE  Oral Contrast: NONE  Complications: None reported at time of study completion    PROCEDURE:  CT of the Chest was performed.  Sagittal and coronal reformats were performed.    FINDINGS:    LUNGS AND AIRWAYS: Patent central airways.  Mosaic attenuation. Unchanged   left apical ill-defined linear opacities. Right upper lobe 3 mm nodule   (2, 51).  PLEURA: No pleural effusion.  MEDIASTINUM AND CARMELA: No lymphadenopathy.  VESSELS: Within normal limits.  HEART: Cardiomegaly. No pericardial effusion. Status post mitral and   tricuspid valve repair.  CHEST WALL AND LOWER NECK: Status post median sternotomy. Retained   epicardial pacer wires.  VISUALIZED UPPER ABDOMEN: Redemonstrated 3.5 cm left upper pole renal   mass.  BONES: Within normal limits.    IMPRESSION:  1.  Mosaic attenuation. Unchanged left apical ill-defined linear   opacities.  2.  Right upper lobe 2 mm nodule, new from the prior study and   indeterminate  3.  Redemonstrated 3.5 cm left upper pole renal mass concerning for   neoplasm. Recommend further evaluation with contrast-enhanced MR abdomen.        --- End of Report ---          JOHAN MOYER MD; Resident Radiologist  This document has been electronically signed.  YSABEL LE MD; Attending Radiologist  This document has been electronically signed. Jan 8 2022  1:48PM    < end of copied text >          < from: Xray Chest 1 View- PORTABLE-Urgent (Xray Chest 1 View- PORTABLE-Urgent .) (01.02.22 @ 13:17) >  ACC: [id number] EXAM:  XR CHEST PORTABLE URGENT 1V                          PROCEDURE DATE:  01/02/2022          INTERPRETATION:  HISTORY: Shortness of breath    TECHNIQUE: A single AP view of the chest was obtained.    COMPARISON: 12/30/2021    FINDINGS: The heart size cannot be adequately assessed on this single   view. The patient is status post median sternotomy and valve repair.   There is moderate pulmonary vascular congestion, improved. The patient's   chin obscures the bilateral lung apices.    IMPRESSION: Moderate pulmonary vascular congestion, improved..    --- End of Report ---    < end of copied text >        < from: Xray Chest 1 View AP/PA (12.30.21 @ 16:46) >  ACC: [id number] EXAM:  XR CHEST AP OR PA 1V                          PROCEDURE DATE:  12/30/2021          INTERPRETATION:  Chest one view    HISTORY: Shortness of breath    COMPARISON STUDY: 12/23/2021    Frontal expiratory view of the chest shows the heart to be similarly   enlarged in size. Sternal wires and cardiac valve ring are again noted.    The lungs show increased pulmonary congestion with small pleural   effusions and there is no evidence of pneumothorax.    IMPRESSION:  Increased congestion.        Thank you for the courtesy of this referral.    --- End of Report ---    < end of copied text >    PROBLEM LIST:  71y Female with HEALTH ISSUES - PROBLEM Dx:  Chronic obstructive pulmonary disease, unspecified COPD type    Hypertension    CAD (coronary artery disease)    Prophylactic measure    Edema    High creatinine    Acute on chronic diastolic congestive heart failure      RECS:    -repeat ABG  -nebs atc,  pulmicort nebs bid  -Cont  prednisone taper, decrease by 10 mg every 3 days.  -Patient with chronic hypercarbic respiratory failure secondary to COPD/obesity, she would benefit from noninvasive ventilation QHS/during sleep while home to avoid further episodes of respiratory failure and rehospitalization.  -cont bipap PRN and during sleep  -supplemental o2,wean as tolerated, do not aim for sats of 100%  -incentive jovanny  -probably needs more diuresis        Em Merida MD  [phone]

## 2022-01-10 NOTE — PROGRESS NOTE ADULT - SUBJECTIVE AND OBJECTIVE BOX
Jonas Frias MD  Interventional Cardiology / Endovascular Specialist  [place] Office : [address] [place] [address] [place] [address]  Tel:   [place] Office : [address] [facility] [place] [address]  Tel: [phone]  Cell : [phone]    Pt is lying in bed comfortable not in distress, no chest pains some SOB no palpitations  	  MEDICATIONS:  heparin   Injectable 5000 Unit(s) SubCutaneous every 8 hours  hydrALAZINE 25 milliGRAM(s) Oral every 8 hours  metoprolol succinate ER 50 milliGRAM(s) Oral daily  albuterol/ipratropium for Nebulization 3 milliLiter(s) Nebulizer every 6 hours  benzonatate 100 milliGRAM(s) Oral every 8 hours  buDESOnide    Inhalation Suspension 0.5 milliGRAM(s) Inhalation two times a day  acetaminophen     Tablet .. 650 milliGRAM(s) Oral every 6 hours PRN  aluminum hydroxide/magnesium hydroxide/simethicone Suspension 30 milliLiter(s) Oral every 4 hours PRN  pantoprazole    Tablet 40 milliGRAM(s) Oral before breakfast  predniSONE   Tablet   Oral   predniSONE   Tablet 30 milliGRAM(s) Oral daily  simvastatin 10 milliGRAM(s) Oral at bedtime  ferrous    sulfate 325 milliGRAM(s) Oral daily  lidocaine   4% Patch 1 Patch Transdermal every 24 hours  oxymetazoline 0.05% Nasal Spray 2 Spray(s) Nasal every 12 hours PRN  sodium chloride 0.65% Nasal 1 Spray(s) Both Nostrils every 8 hours PRN      PAST MEDICAL/SURGICAL HISTORY  PAST MEDICAL & SURGICAL HISTORY:  Atrial fibrillation  S/P Ablation    Pulmonary hypertension    MIGUEL (obstructive sleep apnea)    COPD (chronic obstructive pulmonary disease)  on home O2    CHF (congestive heart failure)    HTN (hypertension)    Gout    Mitral valve replaced    Tricuspid valve replaced    CHF (congestive heart failure)    Hypertension    COPD (chronic obstructive pulmonary disease)    History of mitral valve replacement with mechanical valve    Tricuspid valve replaced  mechanical    No significant past surgical history        SOCIAL HISTORY: Substance Use (street drugs): ( x ) never used  (  ) other:    FAMILY HISTORY:  Family history of hypertension (Father, Sibling)    Family history of stroke    Family history of hypertension (Mother)        REVIEW OF SYSTEMS:  CONSTITUTIONAL: No fever, weight loss, or fatigue  EYES: No eye pain, visual disturbances, or discharge  ENMT:  No difficulty hearing, tinnitus, vertigo; No sinus or throat pain  BREASTS: No pain, masses, or nipple discharge  GASTROINTESTINAL: No abdominal or epigastric pain. No nausea, vomiting, or hematemesis; No diarrhea or constipation. No melena or hematochezia.  GENITOURINARY: No dysuria, frequency, hematuria, or incontinence  NEUROLOGICAL: No headaches, memory loss, loss of strength, numbness, or tremors  ENDOCRINE: No heat or cold intolerance; No hair loss  MUSCULOSKELETAL: No joint pain or swelling; No muscle, back, or extremity pain  PSYCHIATRIC: No depression, anxiety, mood swings, or difficulty sleeping  HEME/LYMPH: No easy bruising, or bleeding gums  All others negative    PHYSICAL EXAM:  T(C): 36.8 (01-10-22 @ 14:15), Max: 37.1 (01-10-22 @ 05:19)  HR: 82 (01-10-22 @ 21:30) (68 - 93)  BP: 158/74 (01-10-22 @ 14:15) (128/64 - 158/74)  RR: 17 (01-10-22 @ 14:15) (16 - 17)  SpO2: 97% (01-10-22 @ 21:30) (96% - 100%)  Wt(kg): --  I&O's Summary    09 Jan 2022 07:01  -  10 Ruddy 2022 07:00  --------------------------------------------------------  IN: 0 mL / OUT: 200 mL / NET: -200 mL             EYES:   PERRLA   ENMT:   Moist mucous membranes, Good dentition, No lesions  Cardiovascular: Normal S1 S2, No JVD, No murmurs, No edema  Respiratory:  b/l wheezing   Gastrointestinal:  Soft, Non-tender, + BS	  Extremities: no edema                                    9.0    6.19  )-----------( 273      ( 09 Jan 2022 08:02 )             32.8     01-09    141  |  99  |  80<H>  ----------------------------<  112<H>  4.6   |  36<H>  |  1.34<H>    Ca    10.1      09 Jan 2022 08:02  Phos  2.5     01-09  Mg     2.30     01-09      proBNP:   Lipid Profile:   HgA1c:   TSH:     Consultant(s) Notes Reviewed:  [x ] YES  [ ] NO    Care Discussed with Consultants/Other Providers [ x] YES  [ ] NO    Imaging Personally Reviewed independently:  [x] YES  [ ] NO    All labs, radiologic studies, vitals, orders and medications list reviewed. Patient is seen and examined at bedside. Case discussed with medical team.

## 2022-01-10 NOTE — PROGRESS NOTE ADULT - SUBJECTIVE AND OBJECTIVE BOX
SUBJECTIVE / OVERNIGHT EVENTS: pt seen and examined  still shortness of breath     MEDICATIONS  (STANDING):  albuterol/ipratropium for Nebulization 3 milliLiter(s) Nebulizer every 6 hours  benzonatate 100 milliGRAM(s) Oral every 8 hours  buDESOnide    Inhalation Suspension 0.5 milliGRAM(s) Inhalation two times a day  ferrous    sulfate 325 milliGRAM(s) Oral daily  heparin   Injectable 5000 Unit(s) SubCutaneous every 8 hours  hydrALAZINE 25 milliGRAM(s) Oral every 8 hours  lidocaine   4% Patch 1 Patch Transdermal every 24 hours  metoprolol succinate ER 50 milliGRAM(s) Oral daily  pantoprazole    Tablet 40 milliGRAM(s) Oral before breakfast  predniSONE   Tablet   Oral   predniSONE   Tablet 30 milliGRAM(s) Oral daily  simvastatin 10 milliGRAM(s) Oral at bedtime    MEDICATIONS  (PRN):  acetaminophen     Tablet .. 650 milliGRAM(s) Oral every 6 hours PRN Moderate Pain (4 - 6)  aluminum hydroxide/magnesium hydroxide/simethicone Suspension 30 milliLiter(s) Oral every 4 hours PRN Dyspepsia  oxymetazoline 0.05% Nasal Spray 2 Spray(s) Nasal every 12 hours PRN Epistaxis  sodium chloride 0.65% Nasal 1 Spray(s) Both Nostrils every 8 hours PRN Nasal Congestion    Vital Signs Last 24 Hrs  T(C): 36.8 (01-10-22 @ 14:15), Max: 37.1 (01-10-22 @ 05:19)  T(F): 98.2 (01-10-22 @ 14:15), Max: 98.7 (01-10-22 @ 05:19)  HR: 75 (01-10-22 @ 15:18) (68 - 93)  BP: 158/74 (01-10-22 @ 14:15) (128/64 - 158/74)  BP(mean): --  RR: 17 (01-10-22 @ 14:15) (16 - 17)  SpO2: 96% (01-10-22 @ 15:18) (96% - 100%)          Constitutional: No fever, fatigue  Skin: No rash.  Eyes: No recent vision problems or eye pain.  ENT: No congestion, ear pain, or sore throat.  Cardiovascular: No chest pain or palpation.  Respiratory: No cough, shortness of breath, congestion, or wheezing.  Gastrointestinal: No abdominal pain, nausea, vomiting, or diarrhea.  Genitourinary: No dysuria.  Musculoskeletal: No joint swelling.  Neurologic: No headache.    PHYSICAL EXAM:  GENERAL: NAD  EYES: EOMI, PERRLA  NECK: Supple, No JVD  CHEST/LUNG: dec breath sounds at bases, fine exp wheezing+  HEART:  S1 , S2 +  ABDOMEN: soft , bs+  EXTREMITIES: + edema   NEUROLOGY:alert awake    LABS:  01-09    141  |  99  |  80<H>  ----------------------------<  112<H>  4.6   |  36<H>  |  1.34<H>    Ca    10.1      09 Jan 2022 08:02  Phos  2.5     01-09  Mg     2.30     01-09      Creatinine Trend: 1.34 <--, 1.46 <--, 1.65 <--, 1.95 <--, 2.34 <--, 1.81 <--                        9.0    6.19  )-----------( 273      ( 09 Jan 2022 08:02 )             32.8     Urine Studies:          ABG - ( 10 Ruddy 2022 17:42 )  pH, Arterial: 7.47  pH, Blood: x     /  pCO2: 54    /  pO2: 136   / HCO3: 39    / Base Excess: 13.3  /  SaO2: 99.6                                  Creatinine, Random Urine: 153 mg/dL (12-26 @ 16:17)

## 2022-01-10 NOTE — PROGRESS NOTE ADULT - ASSESSMENT
71y Female with history of COPD, CHF presents with dizziness. Nephrology consulted for elevated Scr.    1) MISAEL: secondary to HF and infection. Scr now improving. UA active however contaminated and doubt patient with underlying GN. FeUrea low. Bladder scan negative. Avoid nephrotoxins.    2) CKD-3b: Baseline Scr 1.4-1.6. Outpatient CKD work up. Monitor electrolytes.    3) HTN with CKD: BP controlled. Continue with current medications. Monitor BP.    4) LE edema: Improving on bumex 1 mg PO daily. Can increase as needed as per cardiology. Elevated serum CO2 due to respiratory acidosis which is compensated as per blood gas this morning. Prior TTE with grossly normal LVSF. Monitor UO.    5) Anemia: Hb low with iron deficiency s/p venofer s/p Epo 10K X 1 dose on 1/7. Monitor Hb.    6) Hyperkalemia: Resolved. Continue with low K diet. Monitor serum K.      [facility] NEPHROLOGY  Rodrigue Amador M.D.  Rob Perez D.O.  Ana Song M.D.  Lucrecia López, ALLISON, ANP-C    Telephone: [phone]  Facsimile: [phone]    [address] [place] [address]       71y Female with history of COPD, CHF presents with dizziness. Nephrology consulted for elevated Scr.    1) MISAEL: secondary to HF and infection. Scr now improving. UA active however contaminated and doubt patient with underlying GN. FeUrea low. Bladder scan negative. Avoid nephrotoxins.    2) CKD-3b: Baseline Scr 1.4-1.6. Outpatient CKD work up. Monitor electrolytes.    3) HTN with CKD: BP controlled. Continue with current medications. Monitor BP.    4) LE edema: Improving on bumex 1 mg PO daily. Can increase as needed as per cardiology. Elevated serum CO2 due to respiratory acidosis which is compensated as per blood gas this morning. Prior TTE with grossly normal LVSF. Monitor UO.    5) Anemia: Hb low with iron deficiency s/p venofer s/p Epo 10K X 1 dose on 1/7. Monitor Hb.    6) Hyperkalemia: Resolved. Continue with low K diet. Monitor serum K.    7) L renal mass: Incidentally found to CT. Discussed results with patient. Favor outpatient monitoring with urology for surveillance.       [facility] NEPHROLOGY  Rodrigue Amador M.D.  Rob Perez D.O.  Ana Song M.D.  Lucrecia López, MSN, ANP-C    Telephone: [phone]  Facsimile: [phone]    [address] [place] [address]

## 2022-01-10 NOTE — PROGRESS NOTE ADULT - ASSESSMENT
71 year old hx of HTN, dCHF, MV and TV replacement, MIGUEL not on CPAP due to unable to afford, afib s/p ablation not on AC due to chronic bleeding, unable to do c-scope or endoscopy due to medical conditions, COPD on 3 L NC, here for dizziness. x1 week, feeling generally weakness .    EKG - NSR RBBB  Tele - episodes of Afib   Echo in 9/21 shows normal LV s/p MVR     1) Acute on chronic diastolic CHF   - echo in 9/21 shows normal LV s/p MVR  - monitor urine out put, daily weights  -creat worsened, bumex held, now creatnine improved  - back on bumex 1mg daily rales at bases can increase bumex to 1 mg po BID       2) COPD   - on O2   - c/w steroids still wheezing   - f/u pulm     3) Afib  - not on AC, pt doesn't remember why , as per chart sec to GI bleed  - cont toprol increase to toprol to 50mg daily as pt having episodes of rapid afib brief     4) MISAEL  -f/u renal recs    5) DVT prophylaxis   - on sc heparin

## 2022-01-10 NOTE — PROGRESS NOTE ADULT - SUBJECTIVE AND OBJECTIVE BOX
[facility] NEPHROLOGY- PROGRESS NOTE    71y Female with history of COPD, CHF presents with dizziness. Nephrology consulted for elevated Scr.    REVIEW OF SYSTEMS:  Gen: no changes in weight  Cards: no chest pain  Resp: + dyspnea  GI: no nausea or vomiting or diarrhea  Vascular: + LE edema improving    No Known Allergies      Hospital Medications: Medications reviewed      VITALS:  T(F): 98.7 (01-10-22 @ 05:19), Max: 98.7 (01-10-22 @ 05:19)  HR: 68 (01-10-22 @ 05:19)  BP: 128/64 (01-10-22 @ 05:19)  RR: 16 (01-10-22 @ 05:19)  SpO2: 97% (01-10-22 @ 05:19)  Wt(kg): --    01-09 @ 07:01  -  01-10 @ 07:00  --------------------------------------------------------  IN: 0 mL / OUT: 200 mL / NET: -200 mL        PHYSICAL EXAM:    Gen: NAD, calm  Cards: RRR, +S1/S2, no M/G/R  Resp: scattered wheezing, poor airway entry bilaterally, + tachypnea  GI: soft, NT/ND, NABS  Vascular: trace LE edema B/L          LABS:  01-09    141  |  99  |  80<H>  ----------------------------<  112<H>  4.6   |  36<H>  |  1.34<H>    Ca    10.1      09 Jan 2022 08:02  Phos  2.5     01-09  Mg     2.30     01-09      Creatinine Trend: 1.34 <--, 1.46 <--, 1.65 <--, 1.95 <--, 2.34 <--, 1.81 <--                        9.0    6.19  )-----------( 273      ( 09 Jan 2022 08:02 )             32.8     Urine Studies:                   [facility] NEPHROLOGY- PROGRESS NOTE    71y Female with history of COPD, CHF presents with dizziness. Nephrology consulted for elevated Scr.    REVIEW OF SYSTEMS:  Gen: no changes in weight  Cards: no chest pain  Resp: + dyspnea  GI: no nausea or vomiting or diarrhea  Vascular: + LE edema improving    No Known Allergies      Hospital Medications: Medications reviewed      VITALS:  T(F): 98.7 (01-10-22 @ 05:19), Max: 98.7 (01-10-22 @ 05:19)  HR: 68 (01-10-22 @ 05:19)  BP: 128/64 (01-10-22 @ 05:19)  RR: 16 (01-10-22 @ 05:19)  SpO2: 97% (01-10-22 @ 05:19)  Wt(kg): --    01-09 @ 07:01  -  01-10 @ 07:00  --------------------------------------------------------  IN: 0 mL / OUT: 200 mL / NET: -200 mL        PHYSICAL EXAM:    Gen: NAD, calm  Cards: RRR, +S1/S2, no M/G/R  Resp: scattered wheezing, poor airway entry bilaterally, + tachypnea  GI: soft, NT/ND, NABS  Vascular: trace LE edema B/L          LABS:  01-09    141  |  99  |  80<H>  ----------------------------<  112<H>  4.6   |  36<H>  |  1.34<H>    Ca    10.1      09 Jan 2022 08:02  Phos  2.5     01-09  Mg     2.30     01-09      Creatinine Trend: 1.34 <--, 1.46 <--, 1.65 <--, 1.95 <--, 2.34 <--, 1.81 <--                        9.0    6.19  )-----------( 273      ( 09 Jan 2022 08:02 )             32.8     Urine Studies:              < from: CT Chest No Cont (01.08.22 @ 13:17) >  IMPRESSION:  1.  Mosaic attenuation. Unchanged left apical ill-defined linear   opacities.  2.  Right upper lobe 2 mm nodule, new from the prior study and   indeterminate  3.  Redemonstrated 3.5 cm left upper pole renal mass concerning for   neoplasm. Recommend further evaluation with contrast-enhanced MR abdomen.    < end of copied text >

## 2022-01-11 LAB
ANION GAP SERPL CALC-SCNC: 9 MMOL/L — SIGNIFICANT CHANGE UP (ref 7–14)
BASE EXCESS BLDV CALC-SCNC: 11 MMOL/L — HIGH (ref -2–3)
BUN SERPL-MCNC: 71 MG/DL — HIGH (ref 7–23)
CALCIUM SERPL-MCNC: 9.8 MG/DL — SIGNIFICANT CHANGE UP (ref 8.4–10.5)
CHLORIDE SERPL-SCNC: 98 MMOL/L — SIGNIFICANT CHANGE UP (ref 98–107)
CO2 BLDV-SCNC: 41.9 MMOL/L — HIGH (ref 22–26)
CO2 SERPL-SCNC: 33 MMOL/L — HIGH (ref 22–31)
CREAT SERPL-MCNC: 1.36 MG/DL — HIGH (ref 0.5–1.3)
GLUCOSE SERPL-MCNC: 124 MG/DL — HIGH (ref 70–99)
HCO3 BLDV-SCNC: 40 MMOL/L — HIGH (ref 22–29)
HCT VFR BLD CALC: 31.1 % — LOW (ref 34.5–45)
HGB BLD-MCNC: 9 G/DL — LOW (ref 11.5–15.5)
MAGNESIUM SERPL-MCNC: 2.3 MG/DL — SIGNIFICANT CHANGE UP (ref 1.6–2.6)
MCHC RBC-ENTMCNC: 27.8 PG — SIGNIFICANT CHANGE UP (ref 27–34)
MCHC RBC-ENTMCNC: 28.9 GM/DL — LOW (ref 32–36)
MCV RBC AUTO: 96 FL — SIGNIFICANT CHANGE UP (ref 80–100)
NRBC # BLD: 0 /100 WBCS — SIGNIFICANT CHANGE UP
NRBC # FLD: 0 K/UL — SIGNIFICANT CHANGE UP
PCO2 BLDV: 72 MMHG — HIGH (ref 39–42)
PH BLDV: 7.35 — SIGNIFICANT CHANGE UP (ref 7.32–7.43)
PHOSPHATE SERPL-MCNC: 2.2 MG/DL — LOW (ref 2.5–4.5)
PLATELET # BLD AUTO: 258 K/UL — SIGNIFICANT CHANGE UP (ref 150–400)
PO2 BLDV: 38 MMHG — SIGNIFICANT CHANGE UP
POTASSIUM SERPL-MCNC: 4.5 MMOL/L — SIGNIFICANT CHANGE UP (ref 3.5–5.3)
POTASSIUM SERPL-SCNC: 4.5 MMOL/L — SIGNIFICANT CHANGE UP (ref 3.5–5.3)
RBC # BLD: 3.24 M/UL — LOW (ref 3.8–5.2)
RBC # FLD: 18.9 % — HIGH (ref 10.3–14.5)
SAO2 % BLDV: 65.3 % — SIGNIFICANT CHANGE UP
SARS-COV-2 RNA SPEC QL NAA+PROBE: SIGNIFICANT CHANGE UP
SODIUM SERPL-SCNC: 140 MMOL/L — SIGNIFICANT CHANGE UP (ref 135–145)
WBC # BLD: 6.81 K/UL — SIGNIFICANT CHANGE UP (ref 3.8–10.5)
WBC # FLD AUTO: 6.81 K/UL — SIGNIFICANT CHANGE UP (ref 3.8–10.5)

## 2022-01-11 PROCEDURE — 99232 SBSQ HOSP IP/OBS MODERATE 35: CPT

## 2022-01-11 RX ORDER — BUMETANIDE 0.25 MG/ML
1 INJECTION INTRAMUSCULAR; INTRAVENOUS
Refills: 0 | Status: DISCONTINUED | OUTPATIENT
Start: 2022-01-11 | End: 2022-01-14

## 2022-01-11 RX ADMIN — Medication 3 MILLILITER(S): at 21:01

## 2022-01-11 RX ADMIN — Medication 100 MILLIGRAM(S): at 05:51

## 2022-01-11 RX ADMIN — LIDOCAINE 1 PATCH: 4 CREAM TOPICAL at 11:57

## 2022-01-11 RX ADMIN — HEPARIN SODIUM 5000 UNIT(S): 5000 INJECTION INTRAVENOUS; SUBCUTANEOUS at 05:52

## 2022-01-11 RX ADMIN — HEPARIN SODIUM 5000 UNIT(S): 5000 INJECTION INTRAVENOUS; SUBCUTANEOUS at 15:16

## 2022-01-11 RX ADMIN — Medication 3 MILLILITER(S): at 08:03

## 2022-01-11 RX ADMIN — Medication 3 MILLILITER(S): at 15:00

## 2022-01-11 RX ADMIN — BUMETANIDE 1 MILLIGRAM(S): 0.25 INJECTION INTRAMUSCULAR; INTRAVENOUS at 05:52

## 2022-01-11 RX ADMIN — Medication 100 MILLIGRAM(S): at 22:12

## 2022-01-11 RX ADMIN — Medication 0.5 MILLIGRAM(S): at 08:10

## 2022-01-11 RX ADMIN — Medication 25 MILLIGRAM(S): at 22:12

## 2022-01-11 RX ADMIN — Medication 325 MILLIGRAM(S): at 12:21

## 2022-01-11 RX ADMIN — Medication 3 MILLILITER(S): at 03:54

## 2022-01-11 RX ADMIN — SIMVASTATIN 10 MILLIGRAM(S): 20 TABLET, FILM COATED ORAL at 22:12

## 2022-01-11 RX ADMIN — Medication 30 MILLIGRAM(S): at 05:51

## 2022-01-11 RX ADMIN — Medication 0.5 MILLIGRAM(S): at 21:02

## 2022-01-11 RX ADMIN — Medication 100 MILLIGRAM(S): at 15:15

## 2022-01-11 RX ADMIN — HEPARIN SODIUM 5000 UNIT(S): 5000 INJECTION INTRAVENOUS; SUBCUTANEOUS at 22:12

## 2022-01-11 RX ADMIN — LIDOCAINE 1 PATCH: 4 CREAM TOPICAL at 09:15

## 2022-01-11 RX ADMIN — PANTOPRAZOLE SODIUM 40 MILLIGRAM(S): 20 TABLET, DELAYED RELEASE ORAL at 05:53

## 2022-01-11 RX ADMIN — LIDOCAINE 1 PATCH: 4 CREAM TOPICAL at 22:12

## 2022-01-11 RX ADMIN — BUMETANIDE 1 MILLIGRAM(S): 0.25 INJECTION INTRAMUSCULAR; INTRAVENOUS at 18:43

## 2022-01-11 RX ADMIN — Medication 25 MILLIGRAM(S): at 05:51

## 2022-01-11 RX ADMIN — Medication 50 MILLIGRAM(S): at 15:16

## 2022-01-11 RX ADMIN — Medication 25 MILLIGRAM(S): at 15:15

## 2022-01-11 NOTE — PROGRESS NOTE ADULT - SUBJECTIVE AND OBJECTIVE BOX
PULMONARY PROGRESS NOTE    DAMARI GUERRERO  MRN-[id number]    Patient is a 71y old  Female who presents with a chief complaint of dizziness and sOB (02 Jan 2022 10:47)      HPI:  still sob on and off      MEDICATIONS  (STANDING):  albuterol/ipratropium for Nebulization 3 milliLiter(s) Nebulizer every 6 hours  benzonatate 100 milliGRAM(s) Oral every 8 hours  buDESOnide    Inhalation Suspension 0.5 milliGRAM(s) Inhalation two times a day  buMETAnide 1 milliGRAM(s) Oral two times a day  ferrous    sulfate 325 milliGRAM(s) Oral daily  heparin   Injectable 5000 Unit(s) SubCutaneous every 8 hours  hydrALAZINE 25 milliGRAM(s) Oral every 8 hours  lidocaine   4% Patch 1 Patch Transdermal every 24 hours  metoprolol succinate ER 50 milliGRAM(s) Oral daily  pantoprazole    Tablet 40 milliGRAM(s) Oral before breakfast  predniSONE   Tablet 30 milliGRAM(s) Oral daily  predniSONE   Tablet   Oral   simvastatin 10 milliGRAM(s) Oral at bedtime    MEDICATIONS  (PRN):  acetaminophen     Tablet .. 650 milliGRAM(s) Oral every 6 hours PRN Moderate Pain (4 - 6)  aluminum hydroxide/magnesium hydroxide/simethicone Suspension 30 milliLiter(s) Oral every 4 hours PRN Dyspepsia  oxymetazoline 0.05% Nasal Spray 2 Spray(s) Nasal every 12 hours PRN Epistaxis  sodium chloride 0.65% Nasal 1 Spray(s) Both Nostrils every 8 hours PRN Nasal Congestion            EXAM:  Vital Signs Last 24 Hrs  T(C): 37.2 (11 Jan 2022 05:50), Max: 37.2 (10 Ruddy 2022 22:00)  T(F): 99 (11 Jan 2022 05:50), Max: 99 (10 Ruddy 2022 22:00)  HR: 104 (11 Jan 2022 10:55) (69 - 104)  BP: 126/60 (11 Jan 2022 05:50) (111/60 - 158/74)  BP(mean): --  RR: 18 (11 Jan 2022 05:50) (17 - 18)  SpO2: 99% (11 Jan 2022 08:03) (96% - 100%)  GENERAL: The patient is awake and alert   LUNGS: poor insp effort, crackles bases                                       9.0    6.81  )-----------( 258      ( 11 Jan 2022 06:31 )             31.1   01-11    140  |  98  |  71<H>  ----------------------------<  124<H>  4.5   |  33<H>  |  1.36<H>    Ca    9.8      11 Jan 2022 06:31  Phos  2.2     01-11  Mg     2.30     01-11        ABG - ( 10 Ruddy 2022 17:42 )  pH, Arterial: 7.47  pH, Blood: x     /  pCO2: 54    /  pO2: 136   / HCO3: 39    / Base Excess: 13.3  /  SaO2: 99.6              < from: CT Chest No Cont (01.08.22 @ 13:17) >    ACC: [id number] EXAM:  CT CHEST                          PROCEDURE DATE:  01/08/2022          INTERPRETATION:  CLINICAL INFORMATION: Shortness of breath.    COMPARISON: CT abdomen and pelvis 4/12/2021. Ultrasound kidneys and   bladder 4/6/2021.. CT chest 3/12/2009    CONTRAST/COMPLICATIONS:  IV Contrast: NONE  Oral Contrast: NONE  Complications: None reported at time of study completion    PROCEDURE:  CT of the Chest was performed.  Sagittal and coronal reformats were performed.    FINDINGS:    LUNGS AND AIRWAYS: Patent central airways.  Mosaic attenuation. Unchanged   left apical ill-defined linear opacities. Right upper lobe 3 mm nodule   (2, 51).  PLEURA: No pleural effusion.  MEDIASTINUM AND CARMELA: No lymphadenopathy.  VESSELS: Within normal limits.  HEART: Cardiomegaly. No pericardial effusion. Status post mitral and   tricuspid valve repair.  CHEST WALL AND LOWER NECK: Status post median sternotomy. Retained   epicardial pacer wires.  VISUALIZED UPPER ABDOMEN: Redemonstrated 3.5 cm left upper pole renal   mass.  BONES: Within normal limits.    IMPRESSION:  1.  Mosaic attenuation. Unchanged left apical ill-defined linear   opacities.  2.  Right upper lobe 2 mm nodule, new from the prior study and   indeterminate  3.  Redemonstrated 3.5 cm left upper pole renal mass concerning for   neoplasm. Recommend further evaluation with contrast-enhanced MR abdomen.        --- End of Report ---          JOHAN MOYER MD; Resident Radiologist  This document has been electronically signed.  YSABEL LE MD; Attending Radiologist  This document has been electronically signed. Jan 8 2022  1:48PM    < end of copied text >          < from: Xray Chest 1 View- PORTABLE-Urgent (Xray Chest 1 View- PORTABLE-Urgent .) (01.02.22 @ 13:17) >  ACC: [id number] EXAM:  XR CHEST PORTABLE URGENT 1V                          PROCEDURE DATE:  01/02/2022          INTERPRETATION:  HISTORY: Shortness of breath    TECHNIQUE: A single AP view of the chest was obtained.    COMPARISON: 12/30/2021    FINDINGS: The heart size cannot be adequately assessed on this single   view. The patient is status post median sternotomy and valve repair.   There is moderate pulmonary vascular congestion, improved. The patient's   chin obscures the bilateral lung apices.    IMPRESSION: Moderate pulmonary vascular congestion, improved..    --- End of Report ---    < end of copied text >        < from: Xray Chest 1 View AP/PA (12.30.21 @ 16:46) >  ACC: [id number] EXAM:  XR CHEST AP OR PA 1V                          PROCEDURE DATE:  12/30/2021          INTERPRETATION:  Chest one view    HISTORY: Shortness of breath    COMPARISON STUDY: 12/23/2021    Frontal expiratory view of the chest shows the heart to be similarly   enlarged in size. Sternal wires and cardiac valve ring are again noted.    The lungs show increased pulmonary congestion with small pleural   effusions and there is no evidence of pneumothorax.    IMPRESSION:  Increased congestion.        Thank you for the courtesy of this referral.    --- End of Report ---    < end of copied text >    PROBLEM LIST:  71y Female with HEALTH ISSUES - PROBLEM Dx:  Chronic obstructive pulmonary disease, unspecified COPD type    Hypertension    CAD (coronary artery disease)    Prophylactic measure    Edema    High creatinine    Acute on chronic diastolic congestive heart failure      RECS:    -repeat ABG improved  -nebs atc,  pulmicort nebs bid  -Cont  prednisone taper, decrease by 10 mg every 3 days.  -Patient with chronic hypercarbic respiratory failure secondary to COPD/obesity, she would benefit from noninvasive ventilation QHS/during sleep while home to avoid further episodes of respiratory failure and rehospitalization.  -cont bipap PRN and during sleep  -supplemental o2,wean as tolerated, do not aim for sats of 100%  -incentive jovanny  -probably needs more diuresis        Em Merida MD  [phone]

## 2022-01-11 NOTE — PROGRESS NOTE ADULT - ASSESSMENT
71 year old hx of HTN, dCHF, MV and TV replacement, MIGUEL not on CPAP due to unable to afford, afib s/p ablation not on AC due to chronic bleeding, unable to do c-scope or endoscopy due to medical conditions, COPD on 3 L NC, here for dizziness. x1 week, feeling generally weakness .    EKG - NSR RBBB  Tele - episodes of Afib   Echo in 9/21 shows normal LV s/p MVR     1) Acute on chronic diastolic CHF   - echo in 9/21 shows normal LV s/p MVR  - monitor urine out put, daily weights  -creat worsened, bumex held, now creatnine improved  - on bumex 1mg po BID       2) COPD   - on O2   - c/w steroids still wheezing   - f/u pulm     3) Afib  - not on AC, pt doesn't remember why , as per chart sec to GI bleed  - cont toprol increase to toprol to 50mg daily as pt having episodes of rapid afib brief     4) MISAEL  -f/u renal recs    5) DVT prophylaxis   - on sc heparin

## 2022-01-11 NOTE — PROGRESS NOTE ADULT - SUBJECTIVE AND OBJECTIVE BOX
Jonas Frias MD  Interventional Cardiology / Endovascular Specialist  [place] Office : [phone] [place] [address] [place] [address]  Tel:   [place] Office : [address] [facility] [place] [address]  Tel: [phone]  Cell : [phone]    Pt is lying in bed comfortable not in distress, no chest pains some SOB no palpitations  	  MEDICATIONS:  buMETAnide 1 milliGRAM(s) Oral two times a day  heparin   Injectable 5000 Unit(s) SubCutaneous every 8 hours  hydrALAZINE 25 milliGRAM(s) Oral every 8 hours  metoprolol succinate ER 50 milliGRAM(s) Oral daily      albuterol/ipratropium for Nebulization 3 milliLiter(s) Nebulizer every 6 hours  benzonatate 100 milliGRAM(s) Oral every 8 hours  buDESOnide    Inhalation Suspension 0.5 milliGRAM(s) Inhalation two times a day    acetaminophen     Tablet .. 650 milliGRAM(s) Oral every 6 hours PRN    aluminum hydroxide/magnesium hydroxide/simethicone Suspension 30 milliLiter(s) Oral every 4 hours PRN  pantoprazole    Tablet 40 milliGRAM(s) Oral before breakfast    predniSONE   Tablet 30 milliGRAM(s) Oral daily  predniSONE   Tablet   Oral   simvastatin 10 milliGRAM(s) Oral at bedtime    ferrous    sulfate 325 milliGRAM(s) Oral daily  lidocaine   4% Patch 1 Patch Transdermal every 24 hours  oxymetazoline 0.05% Nasal Spray 2 Spray(s) Nasal every 12 hours PRN  sodium chloride 0.65% Nasal 1 Spray(s) Both Nostrils every 8 hours PRN      PAST MEDICAL/SURGICAL HISTORY  PAST MEDICAL & SURGICAL HISTORY:  Atrial fibrillation  S/P Ablation    Pulmonary hypertension    MIGUEL (obstructive sleep apnea)    COPD (chronic obstructive pulmonary disease)  on home O2    CHF (congestive heart failure)    HTN (hypertension)    Gout    Mitral valve replaced    Tricuspid valve replaced    CHF (congestive heart failure)    Hypertension    COPD (chronic obstructive pulmonary disease)    History of mitral valve replacement with mechanical valve    Tricuspid valve replaced  mechanical    No significant past surgical history        SOCIAL HISTORY: Substance Use (street drugs): ( x ) never used  (  ) other:    FAMILY HISTORY:  Family history of hypertension (Father, Sibling)    Family history of stroke    Family history of hypertension (Mother)        REVIEW OF SYSTEMS:  CONSTITUTIONAL: No fever, weight loss, or fatigue  EYES: No eye pain, visual disturbances, or discharge  ENMT:  No difficulty hearing, tinnitus, vertigo; No sinus or throat pain  BREASTS: No pain, masses, or nipple discharge  GASTROINTESTINAL: No abdominal or epigastric pain. No nausea, vomiting, or hematemesis; No diarrhea or constipation. No melena or hematochezia.  GENITOURINARY: No dysuria, frequency, hematuria, or incontinence  NEUROLOGICAL: No headaches, memory loss, loss of strength, numbness, or tremors  ENDOCRINE: No heat or cold intolerance; No hair loss  MUSCULOSKELETAL: No joint pain or swelling; No muscle, back, or extremity pain  PSYCHIATRIC: No depression, anxiety, mood swings, or difficulty sleeping  HEME/LYMPH: No easy bruising, or bleeding gums  All others negative    PHYSICAL EXAM:  T(C): 37.2 (01-11-22 @ 05:50), Max: 37.2 (01-10-22 @ 22:00)  HR: 104 (01-11-22 @ 10:55) (69 - 104)  BP: 126/60 (01-11-22 @ 05:50) (111/60 - 158/74)  RR: 18 (01-11-22 @ 05:50) (17 - 18)  SpO2: 99% (01-11-22 @ 08:03) (96% - 100%)  Wt(kg): --  I&O's Summary    10 Ruddy 2022 07:01  -  11 Jan 2022 07:00  --------------------------------------------------------  IN: 240 mL / OUT: 0 mL / NET: 240 mL           EYES:   PERRLA   ENMT:   Moist mucous membranes, Good dentition, No lesions  Cardiovascular: Normal S1 S2, No JVD, No murmurs, No edema  Respiratory:  b/l wheezing   Gastrointestinal:  Soft, Non-tender, + BS	  Extremities: no edema                              9.0    6.81  )-----------( 258      ( 11 Jan 2022 06:31 )             31.1     01-11    140  |  98  |  71<H>  ----------------------------<  124<H>  4.5   |  33<H>  |  1.36<H>    Ca    9.8      11 Jan 2022 06:31  Phos  2.2     01-11  Mg     2.30     01-11      proBNP:   Lipid Profile:   HgA1c:   TSH:     Consultant(s) Notes Reviewed:  [x ] YES  [ ] NO    Care Discussed with Consultants/Other Providers [ x] YES  [ ] NO    Imaging Personally Reviewed independently:  [x] YES  [ ] NO    All labs, radiologic studies, vitals, orders and medications list reviewed. Patient is seen and examined at bedside. Case discussed with medical team.

## 2022-01-11 NOTE — PROGRESS NOTE ADULT - ASSESSMENT
71y Female with history of COPD, CHF presents with dizziness. Nephrology consulted for elevated Scr.    1) MISAEL: secondary to HF and infection. MISAEL resolved. UA active however contaminated and doubt patient with underlying GN. FeUrea low. Bladder scan negative. Avoid nephrotoxins.    2) CKD-3b: Baseline Scr 1.4-1.6. Outpatient CKD work up. Monitor electrolytes.    3) HTN with CKD: BP controlled. Continue with current medications. Monitor BP.    4) LE edema: Increase bumex to 1 mg PO twice daily given rales on examination. Elevated serum CO2 due to respiratory acidosis with concurrent metabolic alkalosis as per blood gas this morning. Prior TTE with grossly normal LVSF. Monitor UO.    5) Anemia: Hb low with iron deficiency s/p venofer s/p Epo 10K X 1 dose on 1/7. Monitor Hb.    6) Hyperkalemia: Resolved. Continue with low K diet. Monitor serum K.    7) L renal mass: Incidentally found to CT. Discussed results with patient. Favor outpatient monitoring with urology for surveillance.       [facility] NEPHROLOGY  Rodrigue Amador M.D.  Rob Perez D.O.  Ana Song M.D.  Lucrecia López, MSN, ANP-C    Telephone: [phone]  Facsimile: [phone]    [address] [place] [address]

## 2022-01-11 NOTE — PROGRESS NOTE ADULT - SUBJECTIVE AND OBJECTIVE BOX
SUBJECTIVE / OVERNIGHT EVENTS: pt seen and examined  still shortness of breath   MEDICATIONS  (STANDING):  albuterol/ipratropium for Nebulization 3 milliLiter(s) Nebulizer every 6 hours  benzonatate 100 milliGRAM(s) Oral every 8 hours  buDESOnide    Inhalation Suspension 0.5 milliGRAM(s) Inhalation two times a day  buMETAnide 1 milliGRAM(s) Oral two times a day  ferrous    sulfate 325 milliGRAM(s) Oral daily  heparin   Injectable 5000 Unit(s) SubCutaneous every 8 hours  hydrALAZINE 25 milliGRAM(s) Oral every 8 hours  lidocaine   4% Patch 1 Patch Transdermal every 24 hours  metoprolol succinate ER 50 milliGRAM(s) Oral daily  pantoprazole    Tablet 40 milliGRAM(s) Oral before breakfast  predniSONE   Tablet 30 milliGRAM(s) Oral daily  predniSONE   Tablet   Oral   simvastatin 10 milliGRAM(s) Oral at bedtime    MEDICATIONS  (PRN):  acetaminophen     Tablet .. 650 milliGRAM(s) Oral every 6 hours PRN Moderate Pain (4 - 6)  aluminum hydroxide/magnesium hydroxide/simethicone Suspension 30 milliLiter(s) Oral every 4 hours PRN Dyspepsia  oxymetazoline 0.05% Nasal Spray 2 Spray(s) Nasal every 12 hours PRN Epistaxis  sodium chloride 0.65% Nasal 1 Spray(s) Both Nostrils every 8 hours PRN Nasal Congestion    Vital Signs Last 24 Hrs  T(C): 37.1 (01-11-22 @ 18:38), Max: 37.2 (01-10-22 @ 22:00)  T(F): 98.7 (01-11-22 @ 18:38), Max: 99 (01-10-22 @ 22:00)  HR: 68 (01-11-22 @ 18:38) (68 - 104)  BP: 137/60 (01-11-22 @ 18:38) (111/60 - 143/69)  BP(mean): --  RR: 18 (01-11-22 @ 18:38) (18 - 18)  SpO2: 100% (01-11-22 @ 18:38) (97% - 100%)            Constitutional: No fever, fatigue  Skin: No rash.  Eyes: No recent vision problems or eye pain.  ENT: No congestion, ear pain, or sore throat.  Cardiovascular: No chest pain or palpation.  Respiratory: No cough, shortness of breath, congestion, or wheezing.  Gastrointestinal: No abdominal pain, nausea, vomiting, or diarrhea.  Genitourinary: No dysuria.  Musculoskeletal: No joint swelling.  Neurologic: No headache.    PHYSICAL EXAM:  GENERAL: NAD  EYES: EOMI, PERRLA  NECK: Supple, No JVD  CHEST/LUNG: dec breath sounds at bases, fine exp wheezing+  HEART:  S1 , S2 +  ABDOMEN: soft , bs+  EXTREMITIES: + edema   NEUROLOGY:alert awake    LABS:  01-11    140  |  98  |  71<H>  ----------------------------<  124<H>  4.5   |  33<H>  |  1.36<H>    Ca    9.8      11 Jan 2022 06:31  Phos  2.2     01-11  Mg     2.30     01-11      Creatinine Trend: 1.36 <--, 1.34 <--, 1.46 <--, 1.65 <--, 1.95 <--, 2.34 <--                        9.0    6.81  )-----------( 258      ( 11 Jan 2022 06:31 )             31.1     Urine Studies:          ABG - ( 10 Ruddy 2022 17:42 )  pH, Arterial: 7.47  pH, Blood: x     /  pCO2: 54    /  pO2: 136   / HCO3: 39    / Base Excess: 13.3  /  SaO2: 99.6                                            Creatinine, Random Urine: 153 mg/dL (12-26 @ 16:17)

## 2022-01-11 NOTE — PROGRESS NOTE ADULT - SUBJECTIVE AND OBJECTIVE BOX
[facility] NEPHROLOGY- PROGRESS NOTE    71y Female with history of COPD, CHF presents with dizziness. Nephrology consulted for elevated Scr.    REVIEW OF SYSTEMS:  Gen: no changes in weight  Cards: no chest pain  Resp: + dyspnea  GI: no nausea or vomiting or diarrhea  Vascular: + LE edema improving    No Known Allergies      Hospital Medications: Medications reviewed      VITALS:  T(F): 99 (01-11-22 @ 05:50), Max: 99 (01-10-22 @ 22:00)  HR: 69 (01-11-22 @ 08:03)  BP: 126/60 (01-11-22 @ 05:50)  RR: 18 (01-11-22 @ 05:50)  SpO2: 99% (01-11-22 @ 08:03)  Wt(kg): --    01-10 @ 07:01  -  01-11 @ 07:00  --------------------------------------------------------  IN: 240 mL / OUT: 0 mL / NET: 240 mL        PHYSICAL EXAM:    Gen: NAD, calm  Cards: RRR, +S1/S2, no M/G/R  Resp: scattered wheezing, poor airway entry bilaterally  GI: soft, NT/ND, NABS  Vascular: trace LE edema B/L          LABS:  01-11    140  |  98  |  71<H>  ----------------------------<  124<H>  4.5   |  33<H>  |  1.36<H>    Ca    9.8      11 Jan 2022 06:31  Phos  2.2     01-11  Mg     2.30     01-11      Creatinine Trend: 1.36 <--, 1.34 <--, 1.46 <--, 1.65 <--, 1.95 <--, 2.34 <--                        9.0    6.81  )-----------( 258      ( 11 Jan 2022 06:31 )             31.1     Urine Studies:

## 2022-01-12 ENCOUNTER — NON-APPOINTMENT (OUTPATIENT)
Age: 72
End: 2022-01-12

## 2022-01-12 LAB
ANION GAP SERPL CALC-SCNC: 7 MMOL/L — SIGNIFICANT CHANGE UP (ref 7–14)
BUN SERPL-MCNC: 75 MG/DL — HIGH (ref 7–23)
CALCIUM SERPL-MCNC: 9.6 MG/DL — SIGNIFICANT CHANGE UP (ref 8.4–10.5)
CHLORIDE SERPL-SCNC: 100 MMOL/L — SIGNIFICANT CHANGE UP (ref 98–107)
CO2 SERPL-SCNC: 36 MMOL/L — HIGH (ref 22–31)
CREAT SERPL-MCNC: 1.51 MG/DL — HIGH (ref 0.5–1.3)
GLUCOSE SERPL-MCNC: 132 MG/DL — HIGH (ref 70–99)
HCT VFR BLD CALC: 31.8 % — LOW (ref 34.5–45)
HGB BLD-MCNC: 8.7 G/DL — LOW (ref 11.5–15.5)
MCHC RBC-ENTMCNC: 27.4 GM/DL — LOW (ref 32–36)
MCHC RBC-ENTMCNC: 27.4 PG — SIGNIFICANT CHANGE UP (ref 27–34)
MCV RBC AUTO: 100 FL — SIGNIFICANT CHANGE UP (ref 80–100)
NRBC # BLD: 0 /100 WBCS — SIGNIFICANT CHANGE UP
NRBC # FLD: 0 K/UL — SIGNIFICANT CHANGE UP
PLATELET # BLD AUTO: 233 K/UL — SIGNIFICANT CHANGE UP (ref 150–400)
POTASSIUM SERPL-MCNC: 4.5 MMOL/L — SIGNIFICANT CHANGE UP (ref 3.5–5.3)
POTASSIUM SERPL-SCNC: 4.5 MMOL/L — SIGNIFICANT CHANGE UP (ref 3.5–5.3)
RBC # BLD: 3.18 M/UL — LOW (ref 3.8–5.2)
RBC # FLD: 18.9 % — HIGH (ref 10.3–14.5)
SODIUM SERPL-SCNC: 143 MMOL/L — SIGNIFICANT CHANGE UP (ref 135–145)
WBC # BLD: 7.4 K/UL — SIGNIFICANT CHANGE UP (ref 3.8–10.5)
WBC # FLD AUTO: 7.4 K/UL — SIGNIFICANT CHANGE UP (ref 3.8–10.5)

## 2022-01-12 PROCEDURE — 99232 SBSQ HOSP IP/OBS MODERATE 35: CPT

## 2022-01-12 RX ORDER — ERYTHROPOIETIN 10000 [IU]/ML
10000 INJECTION, SOLUTION INTRAVENOUS; SUBCUTANEOUS ONCE
Refills: 0 | Status: DISCONTINUED | OUTPATIENT
Start: 2022-01-12 | End: 2022-01-13

## 2022-01-12 RX ADMIN — HEPARIN SODIUM 5000 UNIT(S): 5000 INJECTION INTRAVENOUS; SUBCUTANEOUS at 06:06

## 2022-01-12 RX ADMIN — Medication 3 MILLILITER(S): at 04:15

## 2022-01-12 RX ADMIN — Medication 30 MILLIGRAM(S): at 06:06

## 2022-01-12 RX ADMIN — PANTOPRAZOLE SODIUM 40 MILLIGRAM(S): 20 TABLET, DELAYED RELEASE ORAL at 06:05

## 2022-01-12 RX ADMIN — Medication 100 MILLIGRAM(S): at 06:06

## 2022-01-12 RX ADMIN — Medication 25 MILLIGRAM(S): at 22:40

## 2022-01-12 RX ADMIN — Medication 50 MILLIGRAM(S): at 13:36

## 2022-01-12 RX ADMIN — Medication 0.5 MILLIGRAM(S): at 22:44

## 2022-01-12 RX ADMIN — Medication 25 MILLIGRAM(S): at 06:06

## 2022-01-12 RX ADMIN — LIDOCAINE 1 PATCH: 4 CREAM TOPICAL at 07:30

## 2022-01-12 RX ADMIN — Medication 325 MILLIGRAM(S): at 13:33

## 2022-01-12 RX ADMIN — BUMETANIDE 1 MILLIGRAM(S): 0.25 INJECTION INTRAMUSCULAR; INTRAVENOUS at 06:07

## 2022-01-12 RX ADMIN — Medication 25 MILLIGRAM(S): at 13:34

## 2022-01-12 RX ADMIN — Medication 100 MILLIGRAM(S): at 13:34

## 2022-01-12 RX ADMIN — Medication 100 MILLIGRAM(S): at 22:40

## 2022-01-12 RX ADMIN — BUMETANIDE 1 MILLIGRAM(S): 0.25 INJECTION INTRAMUSCULAR; INTRAVENOUS at 18:32

## 2022-01-12 RX ADMIN — Medication 3 MILLILITER(S): at 10:57

## 2022-01-12 RX ADMIN — Medication 3 MILLILITER(S): at 15:32

## 2022-01-12 RX ADMIN — Medication 3 MILLILITER(S): at 22:43

## 2022-01-12 RX ADMIN — HEPARIN SODIUM 5000 UNIT(S): 5000 INJECTION INTRAVENOUS; SUBCUTANEOUS at 13:34

## 2022-01-12 RX ADMIN — HEPARIN SODIUM 5000 UNIT(S): 5000 INJECTION INTRAVENOUS; SUBCUTANEOUS at 22:40

## 2022-01-12 RX ADMIN — Medication 0.5 MILLIGRAM(S): at 10:46

## 2022-01-12 RX ADMIN — LIDOCAINE 1 PATCH: 4 CREAM TOPICAL at 10:15

## 2022-01-12 RX ADMIN — LIDOCAINE 1 PATCH: 4 CREAM TOPICAL at 22:39

## 2022-01-12 RX ADMIN — SIMVASTATIN 10 MILLIGRAM(S): 20 TABLET, FILM COATED ORAL at 22:40

## 2022-01-12 NOTE — PROGRESS NOTE ADULT - ASSESSMENT
71 year old hx of HTN, dCHF, MV and TV replacement, MIGUEL not on CPAP due to unable to afford, afib s/p ablation not on AC due to chronic bleeding, unable to do c-scope or endoscopy due to medical conditions, COPD on 3 L NC, here for dizziness. x1 week, feeling generally weakness .    EKG - NSR RBBB  Tele - episodes of Afib   Echo in 9/21 shows normal LV s/p MVR     1) Acute on chronic diastolic CHF   - echo in 9/21 shows normal LV s/p MVR  - monitor urine out put, daily weights  -creat worsened, bumex held, now creatnine improved  - on bumex 1mg po BID     2) COPD   - on O2   - c/w steroids   - f/u pulm     3) Afib  - not on AC, pt doesn't remember why , as per chart sec to GI bleed  - cont toprol 50mg daily     4) MISAEL  -f/u renal recs    5) DVT prophylaxis   - on sc heparin

## 2022-01-12 NOTE — PROGRESS NOTE ADULT - SUBJECTIVE AND OBJECTIVE BOX
[facility] NEPHROLOGY- PROGRESS NOTE    71y Female with history of COPD, CHF presents with dizziness. Nephrology consulted for elevated Scr.    REVIEW OF SYSTEMS:  Gen: no changes in weight  Cards: no chest pain  Resp: + dyspnea improving  GI: no nausea or vomiting or diarrhea  Vascular: + LE edema improving    No Known Allergies      Hospital Medications: Medications reviewed      VITALS:  T(F): 98.5 (01-12-22 @ 06:00), Max: 98.8 (01-11-22 @ 22:10)  HR: 75 (01-12-22 @ 06:57)  BP: 125/60 (01-12-22 @ 06:00)  RR: 18 (01-12-22 @ 06:00)  SpO2: 98% (01-12-22 @ 06:57)  Wt(kg): --    01-11 @ 07:01  -  01-12 @ 07:00  --------------------------------------------------------  IN: 240 mL / OUT: 0 mL / NET: 240 mL          PHYSICAL EXAM:    Gen: NAD, calm  Cards: RRR, +S1/S2, no M/G/R  Resp: course BS B/L with scattered wheezing improving  GI: soft, NT/ND, NABS  Vascular: trace LE edema B/L          LABS:  01-12    143  |  100  |  75<H>  ----------------------------<  132<H>  4.5   |  36<H>  |  1.51<H>    Ca    9.6      12 Jan 2022 07:21  Phos  2.2     01-11  Mg     2.30     01-11      Creatinine Trend: 1.51 <--, 1.36 <--, 1.34 <--, 1.46 <--, 1.65 <--, 1.95 <--                        8.7    7.40  )-----------( 233      ( 12 Jan 2022 07:21 )             31.8     Urine Studies:

## 2022-01-12 NOTE — PROGRESS NOTE ADULT - ASSESSMENT
71y Female with history of COPD, CHF presents with dizziness. Nephrology consulted for elevated Scr.    1) MISAEL: secondary to HF and infection. MISAEL resolved. UA active however contaminated and doubt patient with underlying GN. FeUrea low. Bladder scan negative. Avoid nephrotoxins.    2) CKD-3b: Baseline Scr 1.4-1.6. Outpatient CKD work up. Monitor electrolytes.    3) HTN with CKD: BP controlled. Continue with current medications. Monitor BP.    4) LE edema: Improving. Continue with bumex 1 mg PO twice daily. Elevated serum CO2 due to respiratory acidosis with concurrent metabolic alkalosis as per blood gas on 1/11. Prior TTE with grossly normal LVSF. Monitor UO.    5) Anemia: Hb low with iron deficiency s/p venofer s/p Epo 10K X 1 dose on 1/7. Will give another dose today. Monitor Hb.    6) Hyperkalemia: Resolved. Continue with low K diet. Monitor serum K.    7) L renal mass: Incidentally found to CT. Discussed results with patient. Favor outpatient monitoring with urology for surveillance.       [facility] NEPHROLOGY  Rodrigue Amador M.D.  Rob Perez D.O.  Ana Song M.D.  Lucrecia López, MSN, ANP-C    Telephone: [phone]  Facsimile: [phone]    [address] [place] [address]

## 2022-01-12 NOTE — PROGRESS NOTE ADULT - SUBJECTIVE AND OBJECTIVE BOX
SUBJECTIVE / OVERNIGHT EVENTS: pt seen and examined    MEDICATIONS  (STANDING):  albuterol/ipratropium for Nebulization 3 milliLiter(s) Nebulizer every 6 hours  benzonatate 100 milliGRAM(s) Oral every 8 hours  buDESOnide    Inhalation Suspension 0.5 milliGRAM(s) Inhalation two times a day  buMETAnide 1 milliGRAM(s) Oral two times a day  epoetin livia-epbx (RETACRIT) Injectable 36971 Unit(s) SubCutaneous once  ferrous    sulfate 325 milliGRAM(s) Oral daily  heparin   Injectable 5000 Unit(s) SubCutaneous every 8 hours  hydrALAZINE 25 milliGRAM(s) Oral every 8 hours  lidocaine   4% Patch 1 Patch Transdermal every 24 hours  metoprolol succinate ER 50 milliGRAM(s) Oral daily  pantoprazole    Tablet 40 milliGRAM(s) Oral before breakfast  predniSONE   Tablet   Oral   simvastatin 10 milliGRAM(s) Oral at bedtime    MEDICATIONS  (PRN):  acetaminophen     Tablet .. 650 milliGRAM(s) Oral every 6 hours PRN Moderate Pain (4 - 6)  aluminum hydroxide/magnesium hydroxide/simethicone Suspension 30 milliLiter(s) Oral every 4 hours PRN Dyspepsia  oxymetazoline 0.05% Nasal Spray 2 Spray(s) Nasal every 12 hours PRN Epistaxis  sodium chloride 0.65% Nasal 1 Spray(s) Both Nostrils every 8 hours PRN Nasal Congestion    Vital Signs Last 24 Hrs  T(C): 36.7 (01-12-22 @ 18:30), Max: 36.9 (01-12-22 @ 06:00)  T(F): 98.1 (01-12-22 @ 18:30), Max: 98.5 (01-12-22 @ 06:00)  HR: 78 (01-12-22 @ 18:30) (66 - 82)  BP: 131/67 (01-12-22 @ 18:30) (125/60 - 142/72)  BP(mean): --  RR: 18 (01-12-22 @ 18:30) (18 - 18)  SpO2: 99% (01-12-22 @ 18:30) (97% - 100%)        Constitutional: No fever, fatigue  Skin: No rash.  Eyes: No recent vision problems or eye pain.  ENT: No congestion, ear pain, or sore throat.  Cardiovascular: No chest pain or palpation.  Respiratory: No cough, shortness of breath, congestion, or wheezing.  Gastrointestinal: No abdominal pain, nausea, vomiting, or diarrhea.  Genitourinary: No dysuria.  Musculoskeletal: No joint swelling.  Neurologic: No headache.    PHYSICAL EXAM:  GENERAL: NAD  EYES: EOMI, PERRLA  NECK: Supple, No JVD  CHEST/LUNG: dec breath sounds at bases, fine exp wheezing+  HEART:  S1 , S2 +  ABDOMEN: soft , bs+  EXTREMITIES: + edema   NEUROLOGY:alert awake    LABS:  01-12    143  |  100  |  75<H>  ----------------------------<  132<H>  4.5   |  36<H>  |  1.51<H>    Ca    9.6      12 Jan 2022 07:21  Phos  2.2     01-11  Mg     2.30     01-11      Creatinine Trend: 1.51 <--, 1.36 <--, 1.34 <--, 1.46 <--, 1.65 <--, 1.95 <--                        8.7    7.40  )-----------( 233      ( 12 Jan 2022 07:21 )             31.8     Urine Studies:                                                      Creatinine, Random Urine: 153 mg/dL (12-26 @ 16:17)

## 2022-01-12 NOTE — PROGRESS NOTE ADULT - SUBJECTIVE AND OBJECTIVE BOX
Jonas Frias MD  Interventional Cardiology / Endovascular Specialist  [place] Office : [phone] [place] [address] [place] [address]  Tel:   [place] Office : [address] [facility] [place] [address]  Tel: [phone]  Cell : [phone]    Subjective/Overnight events: Patient sitting at edge of be comfortably. No acute distress.   	  MEDICATIONS:  buMETAnide 1 milliGRAM(s) Oral two times a day  heparin   Injectable 5000 Unit(s) SubCutaneous every 8 hours  hydrALAZINE 25 milliGRAM(s) Oral every 8 hours  metoprolol succinate ER 50 milliGRAM(s) Oral daily      albuterol/ipratropium for Nebulization 3 milliLiter(s) Nebulizer every 6 hours  benzonatate 100 milliGRAM(s) Oral every 8 hours  buDESOnide    Inhalation Suspension 0.5 milliGRAM(s) Inhalation two times a day    acetaminophen     Tablet .. 650 milliGRAM(s) Oral every 6 hours PRN    aluminum hydroxide/magnesium hydroxide/simethicone Suspension 30 milliLiter(s) Oral every 4 hours PRN  pantoprazole    Tablet 40 milliGRAM(s) Oral before breakfast    predniSONE   Tablet   Oral   simvastatin 10 milliGRAM(s) Oral at bedtime    epoetin livia-epbx (RETACRIT) Injectable 06563 Unit(s) SubCutaneous once  ferrous    sulfate 325 milliGRAM(s) Oral daily  lidocaine   4% Patch 1 Patch Transdermal every 24 hours  oxymetazoline 0.05% Nasal Spray 2 Spray(s) Nasal every 12 hours PRN  sodium chloride 0.65% Nasal 1 Spray(s) Both Nostrils every 8 hours PRN      PAST MEDICAL/SURGICAL HISTORY  PAST MEDICAL & SURGICAL HISTORY:  Atrial fibrillation  S/P Ablation    Pulmonary hypertension    MIGUEL (obstructive sleep apnea)    COPD (chronic obstructive pulmonary disease)  on home O2    CHF (congestive heart failure)    HTN (hypertension)    Gout    Mitral valve replaced    Tricuspid valve replaced    CHF (congestive heart failure)    Hypertension    COPD (chronic obstructive pulmonary disease)    History of mitral valve replacement with mechanical valve    Tricuspid valve replaced  mechanical    No significant past surgical history        SOCIAL HISTORY: Substance Use (street drugs): ( x ) never used  (  ) other:    FAMILY HISTORY:  Family history of hypertension (Father, Sibling)    Family history of stroke    Family history of hypertension (Mother)        PHYSICAL EXAM:  T(C): 36.9 (01-12-22 @ 06:00), Max: 37.1 (01-11-22 @ 18:38)  HR: 75 (01-12-22 @ 06:57) (66 - 75)  BP: 125/60 (01-12-22 @ 06:00) (125/60 - 143/69)  RR: 18 (01-12-22 @ 06:00) (18 - 18)  SpO2: 98% (01-12-22 @ 06:57) (97% - 100%)  Wt(kg): --  I&O's Summary    11 Jan 2022 07:01  -  12 Jan 2022 07:00  --------------------------------------------------------  IN: 240 mL / OUT: 0 mL / NET: 240 mL        EYES:   PERRLA   ENMT:   Moist mucous membranes, Good dentition, No lesions  Cardiovascular: Normal S1 S2, No JVD, No murmurs, No edema  Respiratory:  b/l wheezing   Gastrointestinal:  Soft, Non-tender, + BS	  Extremities: no edema                                  8.7    7.40  )-----------( 233      ( 12 Jan 2022 07:21 )             31.8     01-12    143  |  100  |  75<H>  ----------------------------<  132<H>  4.5   |  36<H>  |  1.51<H>    Ca    9.6      12 Jan 2022 07:21  Phos  2.2     01-11  Mg     2.30     01-11      proBNP:   Lipid Profile:   HgA1c:   TSH:     Consultant(s) Notes Reviewed:  [x ] YES  [ ] NO    Care Discussed with Consultants/Other Providers [ x] YES  [ ] NO    Imaging Personally Reviewed independently:  [x] YES  [ ] NO    All labs, radiologic studies, vitals, orders and medications list reviewed. Patient is seen and examined at bedside. Case discussed with medical team.

## 2022-01-12 NOTE — PROGRESS NOTE ADULT - SUBJECTIVE AND OBJECTIVE BOX
PULMONARY PROGRESS NOTE    DAMARI GUERRERO  MRN-[id number]    Patient is a 71y old  Female who presents with a chief complaint of dizziness and sOB (02 Jan 2022 10:47)      HPI:  today says she feels a little bit better      MEDICATIONS  (STANDING):  albuterol/ipratropium for Nebulization 3 milliLiter(s) Nebulizer every 6 hours  benzonatate 100 milliGRAM(s) Oral every 8 hours  buDESOnide    Inhalation Suspension 0.5 milliGRAM(s) Inhalation two times a day  buMETAnide 1 milliGRAM(s) Oral two times a day  epoetin livia-epbx (RETACRIT) Injectable 78022 Unit(s) SubCutaneous once  ferrous    sulfate 325 milliGRAM(s) Oral daily  heparin   Injectable 5000 Unit(s) SubCutaneous every 8 hours  hydrALAZINE 25 milliGRAM(s) Oral every 8 hours  lidocaine   4% Patch 1 Patch Transdermal every 24 hours  metoprolol succinate ER 50 milliGRAM(s) Oral daily  pantoprazole    Tablet 40 milliGRAM(s) Oral before breakfast  predniSONE   Tablet   Oral   simvastatin 10 milliGRAM(s) Oral at bedtime    MEDICATIONS  (PRN):  acetaminophen     Tablet .. 650 milliGRAM(s) Oral every 6 hours PRN Moderate Pain (4 - 6)  aluminum hydroxide/magnesium hydroxide/simethicone Suspension 30 milliLiter(s) Oral every 4 hours PRN Dyspepsia  oxymetazoline 0.05% Nasal Spray 2 Spray(s) Nasal every 12 hours PRN Epistaxis  sodium chloride 0.65% Nasal 1 Spray(s) Both Nostrils every 8 hours PRN Nasal Congestion            EXAM:  Vital Signs Last 24 Hrs  T(C): 36.9 (12 Jan 2022 06:00), Max: 37.1 (11 Jan 2022 18:38)  T(F): 98.5 (12 Jan 2022 06:00), Max: 98.8 (11 Jan 2022 22:10)  HR: 75 (12 Jan 2022 06:57) (66 - 75)  BP: 125/60 (12 Jan 2022 06:00) (125/60 - 143/69)  BP(mean): --  RR: 18 (12 Jan 2022 06:00) (18 - 18)  SpO2: 98% (12 Jan 2022 06:57) (97% - 100%)  GENERAL: The patient is awake and alert   LUNGS: poor insp effort, crackles bases                                       8.7    7.40  )-----------( 233      ( 12 Jan 2022 07:21 )             31.8   01-12    143  |  100  |  75<H>  ----------------------------<  132<H>  4.5   |  36<H>  |  1.51<H>    Ca    9.6      12 Jan 2022 07:21  Phos  2.2     01-11  Mg     2.30     01-11          ABG - ( 10 Ruddy 2022 17:42 )  pH, Arterial: 7.47  pH, Blood: x     /  pCO2: 54    /  pO2: 136   / HCO3: 39    / Base Excess: 13.3  /  SaO2: 99.6              < from: CT Chest No Cont (01.08.22 @ 13:17) >    ACC: [id number] EXAM:  CT CHEST                          PROCEDURE DATE:  01/08/2022          INTERPRETATION:  CLINICAL INFORMATION: Shortness of breath.    COMPARISON: CT abdomen and pelvis 4/12/2021. Ultrasound kidneys and   bladder 4/6/2021.. CT chest 3/12/2009    CONTRAST/COMPLICATIONS:  IV Contrast: NONE  Oral Contrast: NONE  Complications: None reported at time of study completion    PROCEDURE:  CT of the Chest was performed.  Sagittal and coronal reformats were performed.    FINDINGS:    LUNGS AND AIRWAYS: Patent central airways.  Mosaic attenuation. Unchanged   left apical ill-defined linear opacities. Right upper lobe 3 mm nodule   (2, 51).  PLEURA: No pleural effusion.  MEDIASTINUM AND CARMELA: No lymphadenopathy.  VESSELS: Within normal limits.  HEART: Cardiomegaly. No pericardial effusion. Status post mitral and   tricuspid valve repair.  CHEST WALL AND LOWER NECK: Status post median sternotomy. Retained   epicardial pacer wires.  VISUALIZED UPPER ABDOMEN: Redemonstrated 3.5 cm left upper pole renal   mass.  BONES: Within normal limits.    IMPRESSION:  1.  Mosaic attenuation. Unchanged left apical ill-defined linear   opacities.  2.  Right upper lobe 2 mm nodule, new from the prior study and   indeterminate  3.  Redemonstrated 3.5 cm left upper pole renal mass concerning for   neoplasm. Recommend further evaluation with contrast-enhanced MR abdomen.        --- End of Report ---          JOHAN MOYER MD; Resident Radiologist  This document has been electronically signed.  YSABEL LE MD; Attending Radiologist  This document has been electronically signed. Jan 8 2022  1:48PM    < end of copied text >          < from: Xray Chest 1 View- PORTABLE-Urgent (Xray Chest 1 View- PORTABLE-Urgent .) (01.02.22 @ 13:17) >  ACC: [id number] EXAM:  XR CHEST PORTABLE URGENT 1V                          PROCEDURE DATE:  01/02/2022          INTERPRETATION:  HISTORY: Shortness of breath    TECHNIQUE: A single AP view of the chest was obtained.    COMPARISON: 12/30/2021    FINDINGS: The heart size cannot be adequately assessed on this single   view. The patient is status post median sternotomy and valve repair.   There is moderate pulmonary vascular congestion, improved. The patient's   chin obscures the bilateral lung apices.    IMPRESSION: Moderate pulmonary vascular congestion, improved..    --- End of Report ---    < end of copied text >        < from: Xray Chest 1 View AP/PA (12.30.21 @ 16:46) >  ACC: [id number] EXAM:  XR CHEST AP OR PA 1V                          PROCEDURE DATE:  12/30/2021          INTERPRETATION:  Chest one view    HISTORY: Shortness of breath    COMPARISON STUDY: 12/23/2021    Frontal expiratory view of the chest shows the heart to be similarly   enlarged in size. Sternal wires and cardiac valve ring are again noted.    The lungs show increased pulmonary congestion with small pleural   effusions and there is no evidence of pneumothorax.    IMPRESSION:  Increased congestion.        Thank you for the courtesy of this referral.    --- End of Report ---    < end of copied text >    PROBLEM LIST:  71y Female with HEALTH ISSUES - PROBLEM Dx:  Chronic obstructive pulmonary disease, unspecified COPD type    Hypertension    CAD (coronary artery disease)    Prophylactic measure    Edema    High creatinine    Acute on chronic diastolic congestive heart failure      RECS:    -repeat ABG improved  -nebs atc,  pulmicort nebs bid  -Cont  prednisone taper  -Patient with chronic hypercarbic respiratory failure secondary to COPD/obesity, she would benefit from noninvasive ventilation QHS/during sleep while home to avoid further episodes of respiratory failure and rehospitalization.  -cont bipap PRN and during sleep  -supplemental o2,wean as tolerated, do not aim for sats of 100%  -incentive jovanny  -probably needs more diuresis        Em Merida MD  [phone]

## 2022-01-12 NOTE — PROGRESS NOTE ADULT - SUBJECTIVE AND OBJECTIVE BOX
SUBJECTIVE / OVERNIGHT EVENTS: pt seen and examined  still shortness of breath     MEDICATIONS  (STANDING):  albuterol/ipratropium for Nebulization 3 milliLiter(s) Nebulizer every 6 hours  benzonatate 100 milliGRAM(s) Oral every 8 hours  buDESOnide    Inhalation Suspension 0.5 milliGRAM(s) Inhalation two times a day  buMETAnide 1 milliGRAM(s) Oral two times a day  epoetin livia-epbx (RETACRIT) Injectable 45912 Unit(s) SubCutaneous once  ferrous    sulfate 325 milliGRAM(s) Oral daily  heparin   Injectable 5000 Unit(s) SubCutaneous every 8 hours  hydrALAZINE 25 milliGRAM(s) Oral every 8 hours  lidocaine   4% Patch 1 Patch Transdermal every 24 hours  metoprolol succinate ER 50 milliGRAM(s) Oral daily  pantoprazole    Tablet 40 milliGRAM(s) Oral before breakfast  predniSONE   Tablet   Oral   simvastatin 10 milliGRAM(s) Oral at bedtime    MEDICATIONS  (PRN):  acetaminophen     Tablet .. 650 milliGRAM(s) Oral every 6 hours PRN Moderate Pain (4 - 6)  aluminum hydroxide/magnesium hydroxide/simethicone Suspension 30 milliLiter(s) Oral every 4 hours PRN Dyspepsia  oxymetazoline 0.05% Nasal Spray 2 Spray(s) Nasal every 12 hours PRN Epistaxis  sodium chloride 0.65% Nasal 1 Spray(s) Both Nostrils every 8 hours PRN Nasal Congestion    Vital Signs Last 24 Hrs  T(C): 36.7 (01-12-22 @ 18:30), Max: 37.1 (01-11-22 @ 22:10)  T(F): 98.1 (01-12-22 @ 18:30), Max: 98.8 (01-11-22 @ 22:10)  HR: 78 (01-12-22 @ 18:30) (66 - 82)  BP: 131/67 (01-12-22 @ 18:30) (125/60 - 142/72)  BP(mean): --  RR: 18 (01-12-22 @ 18:30) (18 - 18)  SpO2: 99% (01-12-22 @ 18:30) (97% - 100%)            Constitutional: No fever, fatigue  Skin: No rash.  Eyes: No recent vision problems or eye pain.  ENT: No congestion, ear pain, or sore throat.  Cardiovascular: No chest pain or palpation.  Respiratory: No cough, shortness of breath, congestion, or wheezing.  Gastrointestinal: No abdominal pain, nausea, vomiting, or diarrhea.  Genitourinary: No dysuria.  Musculoskeletal: No joint swelling.  Neurologic: No headache.    PHYSICAL EXAM:  GENERAL: NAD  EYES: EOMI, PERRLA  NECK: Supple, No JVD  CHEST/LUNG: dec breath sounds at bases, fine exp wheezing+  HEART:  S1 , S2 +  ABDOMEN: soft , bs+  EXTREMITIES: + edema   NEUROLOGY:alert awake    LABS:  01-12    143  |  100  |  75<H>  ----------------------------<  132<H>  4.5   |  36<H>  |  1.51<H>    Ca    9.6      12 Jan 2022 07:21  Phos  2.2     01-11  Mg     2.30     01-11      Creatinine Trend: 1.51 <--, 1.36 <--, 1.34 <--, 1.46 <--, 1.65 <--, 1.95 <--                        8.7    7.40  )-----------( 233      ( 12 Jan 2022 07:21 )             31.8     Urine Studies:                ABG - ( 10 Ruddy 2022 17:42 )  pH, Arterial: 7.47  pH, Blood: x     /  pCO2: 54    /  pO2: 136   / HCO3: 39    / Base Excess: 13.3  /  SaO2: 99.6                                            Creatinine, Random Urine: 153 mg/dL (12-26 @ 16:17)

## 2022-01-13 LAB
ANION GAP SERPL CALC-SCNC: 8 MMOL/L — SIGNIFICANT CHANGE UP (ref 7–14)
BUN SERPL-MCNC: 78 MG/DL — HIGH (ref 7–23)
CALCIUM SERPL-MCNC: 9.7 MG/DL — SIGNIFICANT CHANGE UP (ref 8.4–10.5)
CHLORIDE SERPL-SCNC: 97 MMOL/L — LOW (ref 98–107)
CO2 SERPL-SCNC: 34 MMOL/L — HIGH (ref 22–31)
CREAT SERPL-MCNC: 1.57 MG/DL — HIGH (ref 0.5–1.3)
GLUCOSE SERPL-MCNC: 173 MG/DL — HIGH (ref 70–99)
HCT VFR BLD CALC: 32 % — LOW (ref 34.5–45)
HGB BLD-MCNC: 9 G/DL — LOW (ref 11.5–15.5)
MCHC RBC-ENTMCNC: 27.3 PG — SIGNIFICANT CHANGE UP (ref 27–34)
MCHC RBC-ENTMCNC: 28.1 GM/DL — LOW (ref 32–36)
MCV RBC AUTO: 97 FL — SIGNIFICANT CHANGE UP (ref 80–100)
NRBC # BLD: 0 /100 WBCS — SIGNIFICANT CHANGE UP
NRBC # FLD: 0 K/UL — SIGNIFICANT CHANGE UP
PLATELET # BLD AUTO: 251 K/UL — SIGNIFICANT CHANGE UP (ref 150–400)
POTASSIUM SERPL-MCNC: 4.4 MMOL/L — SIGNIFICANT CHANGE UP (ref 3.5–5.3)
POTASSIUM SERPL-SCNC: 4.4 MMOL/L — SIGNIFICANT CHANGE UP (ref 3.5–5.3)
RBC # BLD: 3.3 M/UL — LOW (ref 3.8–5.2)
RBC # FLD: 19.4 % — HIGH (ref 10.3–14.5)
SODIUM SERPL-SCNC: 139 MMOL/L — SIGNIFICANT CHANGE UP (ref 135–145)
WBC # BLD: 7.74 K/UL — SIGNIFICANT CHANGE UP (ref 3.8–10.5)
WBC # FLD AUTO: 7.74 K/UL — SIGNIFICANT CHANGE UP (ref 3.8–10.5)

## 2022-01-13 PROCEDURE — 99232 SBSQ HOSP IP/OBS MODERATE 35: CPT

## 2022-01-13 RX ORDER — BUDESONIDE, MICRONIZED 100 %
1 POWDER (GRAM) MISCELLANEOUS
Qty: 1 | Refills: 0
Start: 2022-01-13 | End: 2022-02-11

## 2022-01-13 RX ORDER — BUMETANIDE 0.25 MG/ML
1 INJECTION INTRAMUSCULAR; INTRAVENOUS
Qty: 60 | Refills: 0
Start: 2022-01-13 | End: 2022-02-11

## 2022-01-13 RX ORDER — ERYTHROPOIETIN 10000 [IU]/ML
10000 INJECTION, SOLUTION INTRAVENOUS; SUBCUTANEOUS ONCE
Refills: 0 | Status: DISCONTINUED | OUTPATIENT
Start: 2022-01-13 | End: 2022-01-14

## 2022-01-13 RX ORDER — METOPROLOL TARTRATE 50 MG
1 TABLET ORAL
Qty: 0 | Refills: 0 | DISCHARGE

## 2022-01-13 RX ORDER — METOPROLOL TARTRATE 50 MG
1 TABLET ORAL
Qty: 30 | Refills: 0
Start: 2022-01-13 | End: 2022-02-11

## 2022-01-13 RX ORDER — ACETAMINOPHEN 500 MG
2 TABLET ORAL
Qty: 0 | Refills: 0 | DISCHARGE
Start: 2022-01-13

## 2022-01-13 RX ADMIN — Medication 25 MILLIGRAM(S): at 22:49

## 2022-01-13 RX ADMIN — HEPARIN SODIUM 5000 UNIT(S): 5000 INJECTION INTRAVENOUS; SUBCUTANEOUS at 06:34

## 2022-01-13 RX ADMIN — Medication 3 MILLILITER(S): at 04:36

## 2022-01-13 RX ADMIN — Medication 0.5 MILLIGRAM(S): at 09:52

## 2022-01-13 RX ADMIN — Medication 25 MILLIGRAM(S): at 06:33

## 2022-01-13 RX ADMIN — LIDOCAINE 1 PATCH: 4 CREAM TOPICAL at 07:38

## 2022-01-13 RX ADMIN — Medication 50 MILLIGRAM(S): at 13:45

## 2022-01-13 RX ADMIN — Medication 100 MILLIGRAM(S): at 22:55

## 2022-01-13 RX ADMIN — HEPARIN SODIUM 5000 UNIT(S): 5000 INJECTION INTRAVENOUS; SUBCUTANEOUS at 13:43

## 2022-01-13 RX ADMIN — Medication 3 MILLILITER(S): at 10:02

## 2022-01-13 RX ADMIN — Medication 3 MILLILITER(S): at 15:24

## 2022-01-13 RX ADMIN — Medication 25 MILLIGRAM(S): at 13:44

## 2022-01-13 RX ADMIN — Medication 325 MILLIGRAM(S): at 13:42

## 2022-01-13 RX ADMIN — LIDOCAINE 1 PATCH: 4 CREAM TOPICAL at 22:47

## 2022-01-13 RX ADMIN — SIMVASTATIN 10 MILLIGRAM(S): 20 TABLET, FILM COATED ORAL at 22:48

## 2022-01-13 RX ADMIN — BUMETANIDE 1 MILLIGRAM(S): 0.25 INJECTION INTRAMUSCULAR; INTRAVENOUS at 06:34

## 2022-01-13 RX ADMIN — Medication 3 MILLILITER(S): at 22:32

## 2022-01-13 RX ADMIN — Medication 0.5 MILLIGRAM(S): at 22:32

## 2022-01-13 RX ADMIN — BUMETANIDE 1 MILLIGRAM(S): 0.25 INJECTION INTRAMUSCULAR; INTRAVENOUS at 18:20

## 2022-01-13 RX ADMIN — Medication 100 MILLIGRAM(S): at 06:34

## 2022-01-13 RX ADMIN — LIDOCAINE 1 PATCH: 4 CREAM TOPICAL at 10:36

## 2022-01-13 RX ADMIN — HEPARIN SODIUM 5000 UNIT(S): 5000 INJECTION INTRAVENOUS; SUBCUTANEOUS at 22:50

## 2022-01-13 RX ADMIN — Medication 100 MILLIGRAM(S): at 13:43

## 2022-01-13 RX ADMIN — PANTOPRAZOLE SODIUM 40 MILLIGRAM(S): 20 TABLET, DELAYED RELEASE ORAL at 06:34

## 2022-01-13 RX ADMIN — Medication 20 MILLIGRAM(S): at 06:34

## 2022-01-13 NOTE — PROGRESS NOTE ADULT - PROBLEM SELECTOR PLAN 7
F-1.2 L fluid restrict  E-replete K<4 and Mag <2  N-DASH/TLC diet  SCD for DVT prophylaxis

## 2022-01-13 NOTE — PROGRESS NOTE ADULT - SUBJECTIVE AND OBJECTIVE BOX
PULMONARY PROGRESS NOTE    DAMARI GUERRERO  MRN-[id number]    Patient is a 71y old  Female who presents with a chief complaint of dizziness and sOB (02 Jan 2022 10:47)      HPI:  doing better wants to go home    MEDICATIONS  (STANDING):  albuterol/ipratropium for Nebulization 3 milliLiter(s) Nebulizer every 6 hours  benzonatate 100 milliGRAM(s) Oral every 8 hours  buDESOnide    Inhalation Suspension 0.5 milliGRAM(s) Inhalation two times a day  buMETAnide 1 milliGRAM(s) Oral two times a day  epoetin livia-epbx (RETACRIT) Injectable 31743 Unit(s) SubCutaneous once  ferrous    sulfate 325 milliGRAM(s) Oral daily  heparin   Injectable 5000 Unit(s) SubCutaneous every 8 hours  hydrALAZINE 25 milliGRAM(s) Oral every 8 hours  lidocaine   4% Patch 1 Patch Transdermal every 24 hours  metoprolol succinate ER 50 milliGRAM(s) Oral daily  pantoprazole    Tablet 40 milliGRAM(s) Oral before breakfast  predniSONE   Tablet 20 milliGRAM(s) Oral daily  predniSONE   Tablet   Oral   simvastatin 10 milliGRAM(s) Oral at bedtime    MEDICATIONS  (PRN):  acetaminophen     Tablet .. 650 milliGRAM(s) Oral every 6 hours PRN Moderate Pain (4 - 6)  aluminum hydroxide/magnesium hydroxide/simethicone Suspension 30 milliLiter(s) Oral every 4 hours PRN Dyspepsia  oxymetazoline 0.05% Nasal Spray 2 Spray(s) Nasal every 12 hours PRN Epistaxis  sodium chloride 0.65% Nasal 1 Spray(s) Both Nostrils every 8 hours PRN Nasal Congestion              EXAM:  Vital Signs Last 24 Hrs  T(C): 36.9 (13 Jan 2022 12:47), Max: 36.9 (13 Jan 2022 12:47)  T(F): 98.5 (13 Jan 2022 12:47), Max: 98.5 (13 Jan 2022 12:47)  HR: 74 (13 Jan 2022 12:47) (66 - 86)  BP: 134/74 (13 Jan 2022 12:47) (113/52 - 136/88)  BP(mean): --  RR: 17 (13 Jan 2022 12:47) (16 - 18)  SpO2: 97% (13 Jan 2022 12:47) (97% - 100%)  GENERAL: The patient is awake and alert   LUNGS: poor insp effort, scatterered wheezes                            9.0    7.74  )-----------( 251      ( 13 Jan 2022 08:00 )             32.0   01-13    139  |  97<L>  |  78<H>  ----------------------------<  173<H>  4.4   |  34<H>  |  1.57<H>    Ca    9.7      13 Jan 2022 08:00        ABG - ( 10 Ruddy 2022 17:42 )  pH, Arterial: 7.47  pH, Blood: x     /  pCO2: 54    /  pO2: 136   / HCO3: 39    / Base Excess: 13.3  /  SaO2: 99.6              < from: CT Chest No Cont (01.08.22 @ 13:17) >    ACC: [id number] EXAM:  CT CHEST                          PROCEDURE DATE:  01/08/2022          INTERPRETATION:  CLINICAL INFORMATION: Shortness of breath.    COMPARISON: CT abdomen and pelvis 4/12/2021. Ultrasound kidneys and   bladder 4/6/2021.. CT chest 3/12/2009    CONTRAST/COMPLICATIONS:  IV Contrast: NONE  Oral Contrast: NONE  Complications: None reported at time of study completion    PROCEDURE:  CT of the Chest was performed.  Sagittal and coronal reformats were performed.    FINDINGS:    LUNGS AND AIRWAYS: Patent central airways.  Mosaic attenuation. Unchanged   left apical ill-defined linear opacities. Right upper lobe 3 mm nodule   (2, 51).  PLEURA: No pleural effusion.  MEDIASTINUM AND CARMELA: No lymphadenopathy.  VESSELS: Within normal limits.  HEART: Cardiomegaly. No pericardial effusion. Status post mitral and   tricuspid valve repair.  CHEST WALL AND LOWER NECK: Status post median sternotomy. Retained   epicardial pacer wires.  VISUALIZED UPPER ABDOMEN: Redemonstrated 3.5 cm left upper pole renal   mass.  BONES: Within normal limits.    IMPRESSION:  1.  Mosaic attenuation. Unchanged left apical ill-defined linear   opacities.  2.  Right upper lobe 2 mm nodule, new from the prior study and   indeterminate  3.  Redemonstrated 3.5 cm left upper pole renal mass concerning for   neoplasm. Recommend further evaluation with contrast-enhanced MR abdomen.        --- End of Report ---          JOHAN MOYER MD; Resident Radiologist  This document has been electronically signed.  YSABEL LE MD; Attending Radiologist  This document has been electronically signed. Jan 8 2022  1:48PM    < end of copied text >          < from: Xray Chest 1 View- PORTABLE-Urgent (Xray Chest 1 View- PORTABLE-Urgent .) (01.02.22 @ 13:17) >  ACC: [id number] EXAM:  XR CHEST PORTABLE URGENT 1V                          PROCEDURE DATE:  01/02/2022          INTERPRETATION:  HISTORY: Shortness of breath    TECHNIQUE: A single AP view of the chest was obtained.    COMPARISON: 12/30/2021    FINDINGS: The heart size cannot be adequately assessed on this single   view. The patient is status post median sternotomy and valve repair.   There is moderate pulmonary vascular congestion, improved. The patient's   chin obscures the bilateral lung apices.    IMPRESSION: Moderate pulmonary vascular congestion, improved..    --- End of Report ---    < end of copied text >        < from: Xray Chest 1 View AP/PA (12.30.21 @ 16:46) >  ACC: [id number] EXAM:  XR CHEST AP OR PA 1V                          PROCEDURE DATE:  12/30/2021          INTERPRETATION:  Chest one view    HISTORY: Shortness of breath    COMPARISON STUDY: 12/23/2021    Frontal expiratory view of the chest shows the heart to be similarly   enlarged in size. Sternal wires and cardiac valve ring are again noted.    The lungs show increased pulmonary congestion with small pleural   effusions and there is no evidence of pneumothorax.    IMPRESSION:  Increased congestion.        Thank you for the courtesy of this referral.    --- End of Report ---    < end of copied text >    PROBLEM LIST:  71y Female with HEALTH ISSUES - PROBLEM Dx:  Chronic obstructive pulmonary disease, unspecified COPD type    Hypertension    CAD (coronary artery disease)    Prophylactic measure    Edema    High creatinine    Acute on chronic diastolic congestive heart failure      RECS:    -repeat ABG improved  -nebs atc,  pulmicort nebs bid  -Cont  prednisone taper  -Patient with chronic hypercarbic respiratory failure secondary to COPD/obesity, she would benefit from noninvasive ventilation QHS/during sleep while home to avoid further episodes of respiratory failure and rehospitalization.  -cont bipap PRN and during sleep  -supplemental o2,wean as tolerated, do not aim for sats of 100%  -incentive jovanny  -cont with increased diuretics  -fu in our office for PFT/sleep study/vent managment        Em Merida MD  [phone]

## 2022-01-13 NOTE — PROGRESS NOTE ADULT - PROBLEM SELECTOR PROBLEM 7
Nutrition, metabolism, and development symptoms

## 2022-01-13 NOTE — PROGRESS NOTE ADULT - PROBLEM SELECTOR PLAN 2
-patient w/ MIGUEL unable to afford 168$ a month for machine  pulm f/u  improving wheezing - HMPV+
-patient w/ MIGUEL unable to afford 168$ a month for machine  pulm f/u
-patient w/ MIGUEL unable to afford 168$ a month for machine  pulm f/u  bipap as per pulm + steroids
-patient w/ MIGUEL unable to afford 168$ a month for machine  pulm f/u  wheezing on exam - started pt on steroids
-patient w/ MIGUEL unable to afford 168$ a month for machine  pulm f/u  bipap as per pulm + steroids
-patient w/ MIGUEL unable to afford 168$ a month for machine  pulm f/u  improving wheezing - HMPV+
-patient w/ MIGUEL unable to afford 168$ a month for machine  pulm f/u
-patient w/ MIGUEL unable to afford 168$ a month for machine  pulm f/u  bipap as per pulm + steroids
-patient w/ MIGUEL unable to afford 168$ a month for machine  pulm f/u  bipap as per pulm + steroids
-patient w/ MIGUEL unable to afford 168$ a month for machine  pulm f/u  wheezing on exam - started pt on steroids
-patient w/ MIGUEL unable to afford 168$ a month for machine  pulm f/u  wheezing on exam - started pt on steroids
-patient w/ MIGUEL unable to afford 168$ a month for machine  pulm f/u  improving wheezing - HMPV+
-patient w/ MIGUEL unable to afford 168$ a month for machine  pulm f/u  wheezing on exam - started pt on steroids
-patient w/ MIGUEL unable to afford 168$ a month for machine  pulm f/u  bipap as per pulm + steroids
-patient w/ MIGUEL unable to afford 168$ a month for machine  pulm f/u
-patient w/ MIGUEL unable to afford 168$ a month for machine  pulm f/u  bipap as per pulm + steroids
-patient w/ MIGUEL unable to afford 168$ a month for machine  pulm f/u  improving wheezing - HMPV+
-patient w/ MIGUEL unable to afford 168$ a month for machine  pulm f/u
-patient w/ MIGUEL unable to afford 168$ a month for machine  pulm f/u  improving wheezing - HMPV+
-patient w/ MIGUEL unable to afford 168$ a month for machine  pulm f/u  improving wheezing - HMPV+
-patient w/ MIGUEL unable to afford 168$ a month for machine  pulm f/u  wheezing on exam - started pt on steroids

## 2022-01-13 NOTE — CHART NOTE - NSCHARTNOTEFT_GEN_A_CORE
RD follow-up for length of stay.  Patient is a 71 year old hx of HTN, dCHF, MV and TV replacement, MIGUEL not on CPAP due to unable to afford, afib s/p ablation not on AC due to chronic bleeding, unable to do c-scope or endoscopy due to medical conditions, COPD on 3 L NC, gout, who presents due to acute sob.     Source: Patient [ ]    Family [ ]     other [X] Chart Review    Diet, Regular:   DASH/TLC {Sodium & Cholesterol Restricted} (DASH)  1500mL Fluid Restriction ([id number])  No Concentrated Potassium  Supplement Feeding Modality:  Oral  Ensure Enlive Cans or Servings Per Day:  1       Frequency:  Two Times a day (01-11-22 @ 10:05)    Ensure Enlive 240mls 2x daily (700kcal, 40g protein).     Current Weight: 1/5/22 - 106kg      12/23 - 109.8kg      12/30 - 109.5kg             Pertinent Medications:   albuterol/ipratropium for Nebulization  benzonatate  buDESOnide    Inhalation Suspension  buMETAnide  ferrous    sulfate  hydrALAZINE  metoprolol succinate ER  pantoprazole    Tablet  predniSONE   Tablet  predniSONE   Tablet  simvastatin    Pertinent Labs:  01-12 Na143 mmol/L Glu 132 mg/dL<H> K+ 4.5 mmol/L Cr  1.51 mg/dL<H> BUN 75 mg/dL<H> 01-11 Phos 2.2 mg/dL<L>  Skin:     Estimated Needs:   [ X ] no change since previous assessment  [ ] recalculated:     Previous Nutrition Diagnosis: Decreased PO intake i/s/o HMNV +      Nutrition Diagnosis is [ ] ongoing  [ ] resolved [ ] not applicable     Additional Recommendations:  1) Continue diet and oral nutritional supplement Ensure Enlive 240mls 3x daily (1050kcal, 60g protein).   2) Please consistently document % PO intake in nursing flowsheets to assess adequacy of nutritional intake/monitor need for further nutritional intervention(s). RD follow-up for length of stay.  Patient is a 71 year old hx of HTN, dCHF, MV and TV replacement, MIGUEL not on CPAP due to unable to afford, afib s/p ablation not on AC due to chronic bleeding, unable to do c-scope or endoscopy due to medical conditions, COPD on 3 L NC, gout, who presents due to acute sob. Patient endorses a good appetite, no complaints at this time.      No chewing or swallowing difficulties reported. No GI distress reported i.e. nausea, vomiting, diarrhea. Reviewed current diet order (see below) with patient, no questions or concerns.    Source: Patient [ X ]    Family [ ]     other [X] Chart Review    Diet, Regular:   DASH/TLC {Sodium & Cholesterol Restricted} (DASH)  1500mL Fluid Restriction ([id number])  No Concentrated Potassium  Supplement Feeding Modality:  Oral  Ensure Enlive Cans or Servings Per Day:  1       Frequency:  Two Times a day (01-11-22 @ 10:05)    Ensure Enlive 240mls 2x daily (700kcal, 40g protein).     Current Weight: 1/5/22 - 106kg      12/23 - 109.8kg      12/30 - 109.5kg             Pertinent Medications:   albuterol/ipratropium for Nebulization  benzonatate  buDESOnide    Inhalation Suspension  buMETAnide  ferrous    sulfate  hydrALAZINE  metoprolol succinate ER  pantoprazole    Tablet  predniSONE   Tablet  predniSONE   Tablet  simvastatin    Pertinent Labs:  01-12 Na143 mmol/L Glu 132 mg/dL<H> K+ 4.5 mmol/L Cr  1.51 mg/dL<H> BUN 75 mg/dL<H> 01-11 Phos 2.2 mg/dL<L>  Skin:     Estimated Needs:   [ X ] no change since previous assessment  [ ] recalculated:     Previous Nutrition Diagnosis: Decreased PO intake i/s/o HMNV +      Nutrition Diagnosis is [ ] ongoing  [X] resolved [ ] not applicable     Additional Recommendations:  1) Continue diet and oral nutritional supplement Ensure Enlive 240mls 2x daily (700kcal, 40g protein).   2) Please consistently document % PO intake in nursing flowsheets to assess adequacy of nutritional intake/monitor need for further nutritional intervention(s).

## 2022-01-13 NOTE — PROGRESS NOTE ADULT - PROBLEM SELECTOR PROBLEM 2
MIGUEL (obstructive sleep apnea)

## 2022-01-13 NOTE — PROGRESS NOTE ADULT - PROBLEM SELECTOR PROBLEM 5
Anemia due to chronic blood loss

## 2022-01-13 NOTE — PROGRESS NOTE ADULT - ATTENDING COMMENTS

## 2022-01-13 NOTE — PROGRESS NOTE ADULT - ASSESSMENT
71y Female with history of COPD, CHF presents with dizziness. Nephrology consulted for elevated Scr.    1) MISAEL: secondary to HF and infection. MISAEL resolved. UA active however contaminated and doubt patient with underlying GN. FeUrea low. Bladder scan negative. Avoid nephrotoxins.    2) CKD-3b: Baseline Scr 1.4-1.6. Outpatient CKD work up. Monitor electrolytes.    3) HTN with CKD: BP controlled. Continue with current medications. Monitor BP.    4) LE edema: Improving. Continue with bumex 1 mg PO twice daily. Elevated serum CO2 due to respiratory acidosis with concurrent metabolic alkalosis as per blood gas on 1/11. Prior TTE with grossly normal LVSF. Monitor UO.    5) Anemia: Hb low with iron deficiency s/p venofer s/p Epo 10K X 1 dose on 1/7. Will give another dose today. Monitor Hb.    6) Hyperkalemia: Resolved. Continue with low K diet. Monitor serum K.    7) L renal mass: Incidentally found to CT. Discussed results with patient. Favor outpatient monitoring with urology for surveillance.       [facility] NEPHROLOGY  Rodrigue Amador M.D.  Rob Perez D.O.  Ana Song M.D.  Lucrecia López, MSN, ANP-C    Telephone: [phone]  Facsimile: [phone]    [address] [place] [address]

## 2022-01-13 NOTE — PROGRESS NOTE ADULT - PROBLEM SELECTOR PROBLEM 4
COPD, moderate

## 2022-01-13 NOTE — PROGRESS NOTE ADULT - SUBJECTIVE AND OBJECTIVE BOX
[facility] NEPHROLOGY- PROGRESS NOTE    71y Female with history of COPD, CHF presents with dizziness. Nephrology consulted for elevated Scr.    REVIEW OF SYSTEMS:  Gen: no changes in weight  Cards: no chest pain  Resp: + dyspnea improving  GI: no nausea or vomiting or diarrhea  Vascular: + LE edema improving    No Known Allergies      Hospital Medications: Medications reviewed      VITALS:  T(F): 98.5 (01-13-22 @ 12:47), Max: 98.5 (01-13-22 @ 12:47)  HR: 74 (01-13-22 @ 12:47)  BP: 134/74 (01-13-22 @ 12:47)  RR: 17 (01-13-22 @ 12:47)  SpO2: 97% (01-13-22 @ 12:47)  Wt(kg): --    01-12 @ 07:01  -  01-13 @ 07:00  --------------------------------------------------------  IN: 240 mL / OUT: 0 mL / NET: 240 mL        PHYSICAL EXAM:    Gen: NAD, calm  Cards: RRR, +S1/S2, no M/G/R  Resp: course BS B/L with scattered wheezing improving  GI: soft, NT/ND, NABS  Vascular: trace LE edema B/L          LABS:  01-13    139  |  97<L>  |  78<H>  ----------------------------<  173<H>  4.4   |  34<H>  |  1.57<H>    Ca    9.7      13 Jan 2022 08:00      Creatinine Trend: 1.57 <--, 1.51 <--, 1.36 <--, 1.34 <--, 1.46 <--, 1.65 <--                        9.0    7.74  )-----------( 251      ( 13 Jan 2022 08:00 )             32.0     Urine Studies:

## 2022-01-13 NOTE — PROGRESS NOTE ADULT - PROBLEM SELECTOR PROBLEM 6
Chronic atrial fibrillation

## 2022-01-13 NOTE — PROGRESS NOTE ADULT - PROBLEM SELECTOR PLAN 6
-afib on toprol 25 XL at home  -not on AC as above

## 2022-01-13 NOTE — PROGRESS NOTE ADULT - SUBJECTIVE AND OBJECTIVE BOX
Jonas Frias MD  Interventional Cardiology / Endovascular Specialist  [place] Office : [phone] [place], [place] [address]  Tel:   [place] Office : [address] [facility] [place] [address]  Tel: [phone]  Cell : [phone]    Subjective/Overnight events: Patient lying in bed comfortably. No acute distress.   	  MEDICATIONS:  buMETAnide 1 milliGRAM(s) Oral two times a day  heparin   Injectable 5000 Unit(s) SubCutaneous every 8 hours  hydrALAZINE 25 milliGRAM(s) Oral every 8 hours  metoprolol succinate ER 50 milliGRAM(s) Oral daily      albuterol/ipratropium for Nebulization 3 milliLiter(s) Nebulizer every 6 hours  benzonatate 100 milliGRAM(s) Oral every 8 hours  buDESOnide    Inhalation Suspension 0.5 milliGRAM(s) Inhalation two times a day    acetaminophen     Tablet .. 650 milliGRAM(s) Oral every 6 hours PRN    aluminum hydroxide/magnesium hydroxide/simethicone Suspension 30 milliLiter(s) Oral every 4 hours PRN  pantoprazole    Tablet 40 milliGRAM(s) Oral before breakfast    predniSONE   Tablet 20 milliGRAM(s) Oral daily  predniSONE   Tablet   Oral   simvastatin 10 milliGRAM(s) Oral at bedtime    epoetin livia-epbx (RETACRIT) Injectable 01333 Unit(s) SubCutaneous once  ferrous    sulfate 325 milliGRAM(s) Oral daily  lidocaine   4% Patch 1 Patch Transdermal every 24 hours  oxymetazoline 0.05% Nasal Spray 2 Spray(s) Nasal every 12 hours PRN  sodium chloride 0.65% Nasal 1 Spray(s) Both Nostrils every 8 hours PRN      PAST MEDICAL/SURGICAL HISTORY  PAST MEDICAL & SURGICAL HISTORY:  Atrial fibrillation  S/P Ablation    Pulmonary hypertension    MIGUEL (obstructive sleep apnea)    COPD (chronic obstructive pulmonary disease)  on home O2    CHF (congestive heart failure)    HTN (hypertension)    Gout    Mitral valve replaced    Tricuspid valve replaced    CHF (congestive heart failure)    Hypertension    COPD (chronic obstructive pulmonary disease)    History of mitral valve replacement with mechanical valve    Tricuspid valve replaced  mechanical    No significant past surgical history        SOCIAL HISTORY: Substance Use (street drugs): ( x ) never used  (  ) other:    FAMILY HISTORY:  Family history of hypertension (Father, Sibling)    Family history of stroke    Family history of hypertension (Mother)        PHYSICAL EXAM:  T(C): 36.6 (01-13-22 @ 06:30), Max: 36.9 (01-12-22 @ 13:30)  HR: 80 (01-13-22 @ 10:04) (66 - 86)  BP: 113/52 (01-13-22 @ 06:30) (113/52 - 142/72)  RR: 16 (01-13-22 @ 06:30) (16 - 18)  SpO2: 98% (01-13-22 @ 10:04) (97% - 100%)  Wt(kg): --  I&O's Summary    12 Jan 2022 07:01  -  13 Jan 2022 07:00  --------------------------------------------------------  IN: 240 mL / OUT: 0 mL / NET: 240 mL          EYES:   PERRLA   ENMT:   Moist mucous membranes, Good dentition, No lesions  Cardiovascular: Normal S1 S2, No JVD, No murmurs, No edema  Respiratory:  b/l wheezing   Gastrointestinal:  Soft, Non-tender, + BS	  Extremities: no edema                              9.0    7.74  )-----------( 251      ( 13 Jan 2022 08:00 )             32.0     01-12    143  |  100  |  75<H>  ----------------------------<  132<H>  4.5   |  36<H>  |  1.51<H>    Ca    9.6      12 Jan 2022 07:21      proBNP:   Lipid Profile:   HgA1c:   TSH:     Consultant(s) Notes Reviewed:  [x ] YES  [ ] NO    Care Discussed with Consultants/Other Providers [ x] YES  [ ] NO    Imaging Personally Reviewed independently:  [x] YES  [ ] NO    All labs, radiologic studies, vitals, orders and medications list reviewed. Patient is seen and examined at bedside. Case discussed with medical team.

## 2022-01-13 NOTE — PROGRESS NOTE ADULT - PROBLEM SELECTOR PROBLEM 3
Chronic kidney disease, unspecified CKD stage

## 2022-01-13 NOTE — PROGRESS NOTE ADULT - PROBLEM SELECTOR PROBLEM 1
Acute on chronic diastolic congestive heart failure

## 2022-01-13 NOTE — PROGRESS NOTE ADULT - PROBLEM SELECTOR PLAN 4
-on 3 L NC at home  -no wheezing auscultated on exam  -pro air prn and symbicort, educated pt on using symbicort to prevent COPD exacerbations and hospitalizations as a result of this in the future.  change iv steroids to po
-on 3 L NC at home  -no wheezing auscultated on exam  -pro air prn and symbicort, educated pt on using symbicort to prevent COPD exacerbations and hospitalizations as a result of this in the future.
-on 3 L NC at home  -no wheezing auscultated on exam  -pro air prn and symbicort, educated pt on using symbicort to prevent COPD exacerbations and hospitalizations as a result of this in the future.  change iv steroids to po
-on 3 L NC at home  -no wheezing auscultated on exam  -pro air prn and symbicort, educated pt on using symbicort to prevent COPD exacerbations and hospitalizations as a result of this in the future.
-on 3 L NC at home  -no wheezing auscultated on exam  -pro air prn and symbicort, educated pt on using symbicort to prevent COPD exacerbations and hospitalizations as a result of this in the future.  change iv steroids to po
-on 3 L NC at home  -no wheezing auscultated on exam  -pro air prn and symbicort, educated pt on using symbicort to prevent COPD exacerbations and hospitalizations as a result of this in the future.
-on 3 L NC at home  -no wheezing auscultated on exam  -pro air prn and symbicort, educated pt on using symbicort to prevent COPD exacerbations and hospitalizations as a result of this in the future.  change iv steroids to po
-on 3 L NC at home  -no wheezing auscultated on exam  -pro air prn and symbicort, educated pt on using symbicort to prevent COPD exacerbations and hospitalizations as a result of this in the future.
-on 3 L NC at home  -no wheezing auscultated on exam  -pro air prn and symbicort, educated pt on using symbicort to prevent COPD exacerbations and hospitalizations as a result of this in the future.
-on 3 L NC at home  -no wheezing auscultated on exam  -pro air prn and symbicort, educated pt on using symbicort to prevent COPD exacerbations and hospitalizations as a result of this in the future.  change iv steroids to po
-on 3 L NC at home  -no wheezing auscultated on exam  -pro air prn and symbicort, educated pt on using symbicort to prevent COPD exacerbations and hospitalizations as a result of this in the future.  change iv steroids to po
-on 3 L NC at home  -no wheezing auscultated on exam  -pro air prn and symbicort, educated pt on using symbicort to prevent COPD exacerbations and hospitalizations as a result of this in the future.
-on 3 L NC at home  -no wheezing auscultated on exam  -pro air prn and symbicort, educated pt on using symbicort to prevent COPD exacerbations and hospitalizations as a result of this in the future.
-on 3 L NC at home  -no wheezing auscultated on exam  -pro air prn and symbicort, educated pt on using symbicort to prevent COPD exacerbations and hospitalizations as a result of this in the future.  change iv steroids to po

## 2022-01-13 NOTE — PROGRESS NOTE ADULT - PROBLEM SELECTOR PLAN 3
-patient with CKD with creatinine improved from prior

## 2022-01-13 NOTE — PROGRESS NOTE ADULT - SUBJECTIVE AND OBJECTIVE BOX
SUBJECTIVE / OVERNIGHT EVENTS: pt seen and examined    MEDICATIONS  (STANDING):  albuterol/ipratropium for Nebulization 3 milliLiter(s) Nebulizer every 6 hours  benzonatate 100 milliGRAM(s) Oral every 8 hours  buDESOnide    Inhalation Suspension 0.5 milliGRAM(s) Inhalation two times a day  buMETAnide 1 milliGRAM(s) Oral two times a day  epoetin livia-epbx (RETACRIT) Injectable 26700 Unit(s) SubCutaneous once  ferrous    sulfate 325 milliGRAM(s) Oral daily  heparin   Injectable 5000 Unit(s) SubCutaneous every 8 hours  hydrALAZINE 25 milliGRAM(s) Oral every 8 hours  lidocaine   4% Patch 1 Patch Transdermal every 24 hours  metoprolol succinate ER 50 milliGRAM(s) Oral daily  pantoprazole    Tablet 40 milliGRAM(s) Oral before breakfast  predniSONE   Tablet 20 milliGRAM(s) Oral daily  predniSONE   Tablet   Oral   simvastatin 10 milliGRAM(s) Oral at bedtime    MEDICATIONS  (PRN):  acetaminophen     Tablet .. 650 milliGRAM(s) Oral every 6 hours PRN Moderate Pain (4 - 6)  aluminum hydroxide/magnesium hydroxide/simethicone Suspension 30 milliLiter(s) Oral every 4 hours PRN Dyspepsia  oxymetazoline 0.05% Nasal Spray 2 Spray(s) Nasal every 12 hours PRN Epistaxis  sodium chloride 0.65% Nasal 1 Spray(s) Both Nostrils every 8 hours PRN Nasal Congestion    Vital Signs Last 24 Hrs  T(C): 36.8 (01-13-22 @ 18:18), Max: 36.9 (01-13-22 @ 12:47)  T(F): 98.3 (01-13-22 @ 18:18), Max: 98.5 (01-13-22 @ 12:47)  HR: 70 (01-13-22 @ 18:18) (66 - 86)  BP: 136/80 (01-13-22 @ 18:18) (113/52 - 144/86)  BP(mean): --  RR: 18 (01-13-22 @ 18:18) (16 - 18)  SpO2: 98% (01-13-22 @ 18:18) (97% - 100%)        Constitutional: No fever, fatigue  Skin: No rash.  Eyes: No recent vision problems or eye pain.  ENT: No congestion, ear pain, or sore throat.  Cardiovascular: No chest pain or palpation.  Respiratory: No cough, shortness of breath, congestion, or wheezing.  Gastrointestinal: No abdominal pain, nausea, vomiting, or diarrhea.  Genitourinary: No dysuria.  Musculoskeletal: No joint swelling.  Neurologic: No headache.    PHYSICAL EXAM:  GENERAL: NAD  EYES: EOMI, PERRLA  NECK: Supple, No JVD  CHEST/LUNG: dec breath sounds at bases, fine exp wheezing+  HEART:  S1 , S2 +  ABDOMEN: soft , bs+  EXTREMITIES: + edema   NEUROLOGY:alert awake    LABS:  01-13    139  |  97<L>  |  78<H>  ----------------------------<  173<H>  4.4   |  34<H>  |  1.57<H>    Ca    9.7      13 Jan 2022 08:00      Creatinine Trend: 1.57 <--, 1.51 <--, 1.36 <--, 1.34 <--, 1.46 <--, 1.65 <--                        9.0    7.74  )-----------( 251      ( 13 Jan 2022 08:00 )             32.0     Urine Studies:                                                      Creatinine, Random Urine: 153 mg/dL (12-26 @ 16:17)

## 2022-01-14 VITALS — OXYGEN SATURATION: 98 % | HEART RATE: 77 BPM

## 2022-01-14 PROCEDURE — 99232 SBSQ HOSP IP/OBS MODERATE 35: CPT

## 2022-01-14 RX ADMIN — PANTOPRAZOLE SODIUM 40 MILLIGRAM(S): 20 TABLET, DELAYED RELEASE ORAL at 06:17

## 2022-01-14 RX ADMIN — Medication 25 MILLIGRAM(S): at 06:18

## 2022-01-14 RX ADMIN — Medication 3 MILLILITER(S): at 09:06

## 2022-01-14 RX ADMIN — Medication 20 MILLIGRAM(S): at 06:18

## 2022-01-14 RX ADMIN — LIDOCAINE 1 PATCH: 4 CREAM TOPICAL at 11:37

## 2022-01-14 RX ADMIN — Medication 100 MILLIGRAM(S): at 06:23

## 2022-01-14 RX ADMIN — BUMETANIDE 1 MILLIGRAM(S): 0.25 INJECTION INTRAMUSCULAR; INTRAVENOUS at 06:18

## 2022-01-14 RX ADMIN — Medication 3 MILLILITER(S): at 03:43

## 2022-01-14 RX ADMIN — Medication 0.5 MILLIGRAM(S): at 09:08

## 2022-01-14 RX ADMIN — LIDOCAINE 1 PATCH: 4 CREAM TOPICAL at 08:21

## 2022-01-14 RX ADMIN — HEPARIN SODIUM 5000 UNIT(S): 5000 INJECTION INTRAVENOUS; SUBCUTANEOUS at 06:23

## 2022-01-14 NOTE — PROGRESS NOTE ADULT - REASON FOR ADMISSION
dizziness and sOB
No

## 2022-01-14 NOTE — PROGRESS NOTE ADULT - PROVIDER SPECIALTY LIST ADULT
Cardiology
Internal Medicine
Nephrology
Pulmonology
Cardiology
Infectious Disease
Infectious Disease
Nephrology
Pulmonology
Cardiology
Infectious Disease
Infectious Disease
Internal Medicine
Internal Medicine
Nephrology
Pulmonology
Cardiology
Nephrology
Internal Medicine

## 2022-01-14 NOTE — PROGRESS NOTE ADULT - SUBJECTIVE AND OBJECTIVE BOX
PULMONARY PROGRESS NOTE    DAMARI GUERRERO  MRN-[id number]    Patient is a 71y old  Female who presents with a chief complaint of dizziness and sOB (02 Jan 2022 10:47)      HPI:  dressed and ready to go home  says she feels better    MEDICATIONS  (STANDING):  albuterol/ipratropium for Nebulization 3 milliLiter(s) Nebulizer every 6 hours  benzonatate 100 milliGRAM(s) Oral every 8 hours  buDESOnide    Inhalation Suspension 0.5 milliGRAM(s) Inhalation two times a day  buMETAnide 1 milliGRAM(s) Oral two times a day  epoetin livia-epbx (RETACRIT) Injectable 01508 Unit(s) SubCutaneous once  ferrous    sulfate 325 milliGRAM(s) Oral daily  heparin   Injectable 5000 Unit(s) SubCutaneous every 8 hours  hydrALAZINE 25 milliGRAM(s) Oral every 8 hours  lidocaine   4% Patch 1 Patch Transdermal every 24 hours  metoprolol succinate ER 50 milliGRAM(s) Oral daily  pantoprazole    Tablet 40 milliGRAM(s) Oral before breakfast  predniSONE   Tablet 20 milliGRAM(s) Oral daily  predniSONE   Tablet   Oral   simvastatin 10 milliGRAM(s) Oral at bedtime    MEDICATIONS  (PRN):  acetaminophen     Tablet .. 650 milliGRAM(s) Oral every 6 hours PRN Moderate Pain (4 - 6)  aluminum hydroxide/magnesium hydroxide/simethicone Suspension 30 milliLiter(s) Oral every 4 hours PRN Dyspepsia  oxymetazoline 0.05% Nasal Spray 2 Spray(s) Nasal every 12 hours PRN Epistaxis  sodium chloride 0.65% Nasal 1 Spray(s) Both Nostrils every 8 hours PRN Nasal Congestion            EXAM:  Vital Signs Last 24 Hrs  T(C): 36.7 (14 Jan 2022 06:15), Max: 36.9 (13 Jan 2022 12:47)  T(F): 98.1 (14 Jan 2022 06:15), Max: 98.5 (13 Jan 2022 12:47)  HR: 77 (14 Jan 2022 09:15) (64 - 79)  BP: 113/66 (14 Jan 2022 06:15) (113/66 - 144/86)  BP(mean): --  RR: 18 (14 Jan 2022 06:15) (17 - 18)  SpO2: 99% (14 Jan 2022 09:15) (97% - 100%)  GENERAL: The patient is awake and alert   LUNGS: poor insp effort, scatterered wheezes                          9.0    7.74  )-----------( 251      ( 13 Jan 2022 08:00 )             32.0     01-13    139  |  97<L>  |  78<H>  ----------------------------<  173<H>  4.4   |  34<H>  |  1.57<H>    Ca    9.7      13 Jan 2022 08:00      ABG - ( 10 Ruddy 2022 17:42 )  pH, Arterial: 7.47  pH, Blood: x     /  pCO2: 54    /  pO2: 136   / HCO3: 39    / Base Excess: 13.3  /  SaO2: 99.6              < from: CT Chest No Cont (01.08.22 @ 13:17) >    ACC: [id number] EXAM:  CT CHEST                          PROCEDURE DATE:  01/08/2022          INTERPRETATION:  CLINICAL INFORMATION: Shortness of breath.    COMPARISON: CT abdomen and pelvis 4/12/2021. Ultrasound kidneys and   bladder 4/6/2021.. CT chest 3/12/2009    CONTRAST/COMPLICATIONS:  IV Contrast: NONE  Oral Contrast: NONE  Complications: None reported at time of study completion    PROCEDURE:  CT of the Chest was performed.  Sagittal and coronal reformats were performed.    FINDINGS:    LUNGS AND AIRWAYS: Patent central airways.  Mosaic attenuation. Unchanged   left apical ill-defined linear opacities. Right upper lobe 3 mm nodule   (2, 51).  PLEURA: No pleural effusion.  MEDIASTINUM AND CARMELA: No lymphadenopathy.  VESSELS: Within normal limits.  HEART: Cardiomegaly. No pericardial effusion. Status post mitral and   tricuspid valve repair.  CHEST WALL AND LOWER NECK: Status post median sternotomy. Retained   epicardial pacer wires.  VISUALIZED UPPER ABDOMEN: Redemonstrated 3.5 cm left upper pole renal   mass.  BONES: Within normal limits.    IMPRESSION:  1.  Mosaic attenuation. Unchanged left apical ill-defined linear   opacities.  2.  Right upper lobe 2 mm nodule, new from the prior study and   indeterminate  3.  Redemonstrated 3.5 cm left upper pole renal mass concerning for   neoplasm. Recommend further evaluation with contrast-enhanced MR abdomen.        --- End of Report ---          JOHAN MOYER MD; Resident Radiologist  This document has been electronically signed.  YSABEL LE MD; Attending Radiologist  This document has been electronically signed. Jan 8 2022  1:48PM    < end of copied text >          < from: Xray Chest 1 View- PORTABLE-Urgent (Xray Chest 1 View- PORTABLE-Urgent .) (01.02.22 @ 13:17) >  ACC: [id number] EXAM:  XR CHEST PORTABLE URGENT 1V                          PROCEDURE DATE:  01/02/2022          INTERPRETATION:  HISTORY: Shortness of breath    TECHNIQUE: A single AP view of the chest was obtained.    COMPARISON: 12/30/2021    FINDINGS: The heart size cannot be adequately assessed on this single   view. The patient is status post median sternotomy and valve repair.   There is moderate pulmonary vascular congestion, improved. The patient's   chin obscures the bilateral lung apices.    IMPRESSION: Moderate pulmonary vascular congestion, improved..    --- End of Report ---    < end of copied text >        < from: Xray Chest 1 View AP/PA (12.30.21 @ 16:46) >  ACC: [id number] EXAM:  XR CHEST AP OR PA 1V                          PROCEDURE DATE:  12/30/2021          INTERPRETATION:  Chest one view    HISTORY: Shortness of breath    COMPARISON STUDY: 12/23/2021    Frontal expiratory view of the chest shows the heart to be similarly   enlarged in size. Sternal wires and cardiac valve ring are again noted.    The lungs show increased pulmonary congestion with small pleural   effusions and there is no evidence of pneumothorax.    IMPRESSION:  Increased congestion.        Thank you for the courtesy of this referral.    --- End of Report ---    < end of copied text >    PROBLEM LIST:  71y Female with HEALTH ISSUES - PROBLEM Dx:  Chronic obstructive pulmonary disease, unspecified COPD type    Hypertension    CAD (coronary artery disease)    Prophylactic measure    Edema    High creatinine    Acute on chronic diastolic congestive heart failure      RECS:    -repeat ABG improved  -can dc with symbicort and prn albuterol  -finish prednisone taper  -Patient with chronic hypercarbic respiratory failure secondary to COPD/obesity, she would benefit from noninvasive ventilation QHS/during sleep while home to avoid further episodes of respiratory failure and rehospitalization.  -cont bipap PRN and during sleep  -supplemental o2,wean as tolerated, do not aim for sats of 100%  -incentive jovanny  -cont with increased diuretics  -fu in our office for PFT/sleep study/vent managment        Em Merida MD  [phone]

## 2022-01-14 NOTE — PROGRESS NOTE ADULT - ASSESSMENT
71y Female with history of COPD, CHF presents with dizziness. Nephrology consulted for elevated Scr.    1) MISAEL: secondary to HF and infection. MISAEL resolved. UA active however contaminated and doubt patient with underlying GN. FeUrea low. Bladder scan negative. Avoid nephrotoxins.    2) CKD-3b: Baseline Scr 1.4-1.6. Outpatient CKD work up. Monitor electrolytes.    3) HTN with CKD: BP controlled. Continue with current medications. Monitor BP.    4) LE edema: Improving. Continue with bumex 1 mg PO twice daily. Elevated serum CO2 due to respiratory acidosis with concurrent metabolic alkalosis as per blood gas on 1/11. Prior TTE with grossly normal LVSF. Monitor UO.    5) Anemia: Hb low with iron deficiency s/p venofer s/p Epo 10K X 1 dose on 1/7. Epo ordered on 1/12 and 1/13 and not administered? Unclear why? Monitor Hb.    6) Hyperkalemia: Resolved. Continue with low K diet. Monitor serum K.    7) L renal mass: Incidentally found to CT. Discussed results with patient. Favor outpatient monitoring with urology for surveillance.       [facility] NEPHROLOGY  Rodrigue Amador M.D.  Rob Perez D.O.  Ana Song M.D.  Lucrecia López, MSN, ANP-C    Telephone: [phone]  Facsimile: [phone]    [id number] [place] [address]

## 2022-01-14 NOTE — PROGRESS NOTE ADULT - SUBJECTIVE AND OBJECTIVE BOX
[facility] NEPHROLOGY- PROGRESS NOTE    71y Female with history of COPD, CHF presents with dizziness. Nephrology consulted for elevated Scr.    REVIEW OF SYSTEMS:  Gen: no changes in weight  Cards: no chest pain  Resp: + dyspnea improving  GI: no nausea or vomiting or diarrhea  Vascular: + LE edema improving    No Known Allergies      Hospital Medications: Medications reviewed      VITALS:  T(F): 98.1 (01-14-22 @ 06:15), Max: 98.5 (01-13-22 @ 12:47)  HR: 77 (01-14-22 @ 09:15)  BP: 113/66 (01-14-22 @ 06:15)  RR: 18 (01-14-22 @ 06:15)  SpO2: 99% (01-14-22 @ 09:15)  Wt(kg): --        PHYSICAL EXAM:    Gen: NAD, calm  Cards: RRR, +S1/S2, no M/G/R  Resp: course BS B/L with scattered wheezing improving  GI: soft, NT/ND, NABS  Vascular: trace LE edema B/L      LABS:  01-13    139  |  97<L>  |  78<H>  ----------------------------<  173<H>  4.4   |  34<H>  |  1.57<H>    Ca    9.7      13 Jan 2022 08:00      Creatinine Trend: 1.57 <--, 1.51 <--, 1.36 <--, 1.34 <--, 1.46 <--                        9.0    7.74  )-----------( 251      ( 13 Jan 2022 08:00 )             32.0     Urine Studies:

## 2022-02-02 NOTE — PHARMACY COMMUNICATION NOTE - COMMENTS
farrukh johns is aware of standing lasix dose for 3 days, wants one extra lasix dose in addition Statement Selected

## 2022-02-24 NOTE — DISCHARGE NOTE PROVIDER - REASON FOR ADMISSION
dyspnea on exertion/ Pul edema/ COPD/ HFwPEF/ MVR/TVR/ anemia / FOB positive
normal/airway patent/breath sounds equal/good air movement/respirations non-labored/clear to auscultation bilaterally/no chest wall tenderness/no intercostal retractions/no rales/no rhonchi/no subcutaneous emphysema

## 2022-03-02 NOTE — DIETITIAN INITIAL EVALUATION ADULT. - PROBLEM/PLAN-3
03/02/22 1353: Caterina from [employer] called and requested the pt's RTW letter be emailed to her at [email or web address]. Writer performed request.  
DISPLAY PLAN FREE TEXT

## 2022-03-17 NOTE — PATIENT PROFILE ADULT - PATIENT REPRESENTATIVE: ( YOU CAN CHOOSE ANY PERSON THAT CAN ASSIST YOU WITH YOUR HEALTH CARE PREFERENCES, DOES NOT HAVE TO BE A SPOUSE, IMMEDIATE FAMILY OR SIGNIFICANT OTHER/PARTNER)
51yo M w/ pmhx of IVDU (heroin), vertebral osteomyelitis, and MSSA bacteremia presents for worsening back pain    Spinal OM, with epidural abscess/phlegmon---Recently completed 6wks of antibiotics which ended on 3/1.  MR Lumbar Spine 03/18 significant for  left L5-S1 facetitis with associated multifocal  abscess formation directly adjacent to the left facet joint, Ventral epidural phlegmon overlying the L5 vertebral body causing significant compression of the descending nerve roots.  ID noted-chest findings and involvement of facet joints, would rule out TB with AFB sputum x 3 , quantiferon gold , repeat HIV screen negative & pulmonary evaluation suggested no new intervention  Airbron precautions until ruled out for TB  cefazolin 2g q 8 hours for now  Follow-up blood cx are negative  will follow results of IR biopsy done yesterday to r/o TB.    Opioid abuse on Methadone  RUL Nodular Opacities  Recent history & post treatment for MSSA bacteremia  Hx of IVDU  Active nicotine dependence  morbid obesity    monitor for clinical recovery              IR consult for possible aspiration & cx-- IR biopsy 3/16  Continue methadone 135, and gabapentin  Neuro sx following- no current intervention   Chronic b/l Wrist ulceration - healing  Handoff: Pending IR for procedure, AFB to rule out TB . declines

## 2022-05-03 NOTE — ED SUB INTERN NOTE - CROS ED CONS ALL NEG
Inform patient via [employer]  Please review the pathology/lab result of further discussion      Copied from [id number] message : negative...

## 2022-05-13 NOTE — DISCHARGE NOTE PROVIDER - NSDCHHCONTACT_GEN_ALL_CORE_FT
As certified below, I, or a nurse practitioner or physician assistant working with me, had a face-to-face encounter that meets the physician face-to-face encounter requirements.
13-Feb-2022

## 2022-06-06 NOTE — CONSULT NOTE ADULT - SUBJECTIVE AND OBJECTIVE BOX
PATIENT SEEN AND EXAMINED ON -04/01/2021  DATE OF SERVICE:   04/01/2021 Interim events noted,Labs ,Radiological studies and Cardiology tests reviewed .      History of Present Illness:     70 y.o. F with PMH of morbid obesity, CAD,  HFpEF (EF 65-60%), severe MR and TR s/p mechanical MV and TV repair (on coumadin), Afib s/p failed ablation, HTN, HLD, HTN, MIGUEL, COPD (on 3L O2), CKD3, gout, anemia (on iron pills) who presents with worsening dyspnea since 4 days prior to admission. Patient reports dyspnea worsening with exertion and orthopnea. No increased cough, no increased sputum production, and patient has remained on her baseline of 3L NC. She also has been reporting intermittent dark stools, which she has attributed to taking PO iron. Patient feels she may have been consuming more liquids than her fluid restriction allows and sometimes took additional Lasix PRN at home. She also endorses epigastric pain that has since resolved. ROS negative for fevers, chills, N/V/D, dysuria, or hematuria.     She currently states she is breathing more comfortably after receiving IV Lasix in ED.     Review of Systems:  Review of Systems: REVIEW OF SYSTEMS:    CONSTITUTIONAL: No weakness, fevers or chills  EYES/ENT: No visual changes;  No vertigo or throat pain   NECK: No pain or stiffness  RESPIRATORY: No cough, wheezing, hemoptysis; + shortness of breath  CARDIOVASCULAR: No chest pain or palpitations  GASTROINTESTINAL: No abdominal or epigastric pain. No nausea, vomiting, or hematemesis; No diarrhea or constipation. + occasional dark stools  GENITOURINARY: No dysuria, frequency or hematuria  NEUROLOGICAL: No numbness or weakness  PSYCH: No A/V hallucinations  MSK: No joint pain  SKIN: No itching, rashes    Other Review of Systems: All other review of systems negative, except as noted in HPI        Allergies and Intolerances:        Allergies:  	No Known Allergies:     Home Medications:   * Patient Currently Takes Medications as of 01-Apr-2021 11:57 documented in Structured Notes  · 	ramipril 2.5 mg oral capsule: Last Dose Taken:  , 1 cap(s) orally once a day   · 	simvastatin 10 mg oral tablet: Last Dose Taken:  , 1 tab(s) orally once a day (at bedtime)  · 	sodium chloride 0.65% nasal spray: Last Dose Taken:  , 2 spray(s) nasal 4 times a day   · 	bumetanide 2 mg oral tablet: Last Dose Taken:  , 1 tab(s) orally once a day  · 	budesonide-formoterol 160 mcg-4.5 mcg/inh inhalation aerosol: Last Dose Taken:  , 2  inhaled 2 times a day  · 	metoprolol succinate 25 mg oral tablet, extended release: Last Dose Taken:  , 1 tab(s) orally once a day  · 	warfarin 2.5 mg oral tablet: Last Dose Taken:  , 1 tab(s) orally once a day   · 	allopurinol 100 mg oral tablet: Last Dose Taken:  , 1 tab(s) orally once a day  · 	digoxin 125 mcg (0.125 mg) oral tablet: Last Dose Taken:  , 1 tab(s) orally once a day  · 	Albuterol (Eqv-ProAir HFA) 90 mcg/inh inhalation aerosol: Last Dose Taken:  , 2 puff(s) inhaled every 6 hours  · 	Lasix 40 mg oral tablet: 1 tab(s) orally once a day, As Needed    Patient History:   Past Medical, Past Surgical, and Family History:  PAST MEDICAL HISTORY:  Atrial fibrillation S/P Ablation    CHF (congestive heart failure)     CHF (congestive heart failure)     COPD (chronic obstructive pulmonary disease)     COPD (chronic obstructive pulmonary disease) on home O2    Gout     HTN (hypertension)     Hypertension     Mitral valve replaced     MIGUEL (obstructive sleep apnea)     Pulmonary hypertension     Tricuspid valve replaced.     PAST SURGICAL HISTORY:  History of mitral valve replacement with mechanical valve     No significant past surgical history     Tricuspid valve replaced mechanical.     FAMILY HISTORY:  Family history of stroke    Father  Still living? Unknown  Family history of hypertension, Age at diagnosis: Age Unknown    Mother  Still living? Unknown  Family history of hypertension, Age at diagnosis: Age Unknown    Sibling  Still living? Unknown  Family history of hypertension, Age at diagnosis: Age Unknown.    Social History:  Social History (marital status, living situation, occupation, tobacco use, alcohol and drug use, and sexual history): Patient lives at home with her . She has never been a smoker. No EtOH consumption or use of illicit drugs.          Physical Exam:        Vital Signs Last 24 Hrs  T(C): 36.5 (01 Apr 2021 07:18), Max: 37.1 (01 Apr 2021 01:52)  T(F): 97.7 (01 Apr 2021 07:18), Max: 98.7 (01 Apr 2021 01:52)  HR: 62 (01 Apr 2021 11:45) (48 - 69)  BP: 127/63 (01 Apr 2021 11:45) (120/57 - 165/56)  RR: 21 (01 Apr 2021 09:40) (18 - 21)  SpO2: 100% (01 Apr 2021 09:40) (99% - 100%)          General: Obese female in NAD  Neurology: A&Ox3, nonfocal, MARMOLEJO x 4  Eyes: PERRLA/ EOMI, Gross vision intact  ENT/Neck: Neck supple, trachea midline, No JVD, Gross hearing intact  Respiratory: Mild tachypnea. CTA B/L, No wheezing, rales, rhonchi  CV: RRR, S1S2, systolic murmur 2+. Trace LE edema.   Abdominal: Obese. Soft, NT, ND +BS. Rectal exam with brown stool, no melena or hematochezia  Extremities:  + peripheral pulses  Skin: No Rashes, Hematoma, Ecchymosis        Labs, Radiology, Cardiology, and Other Results: .                        7.8    5.60  )-----------( 258      ( 01 Apr 2021 02:35 )             27.3     Hgb Trend: 7.8<--  04-01    138  |  94<L>  |  81<H>  ----------------------------<  118<H>  5.1   |  36<H>  |  1.63<H>    Ca    10.7<H>      01 Apr 2021 02:35  Phos  3.3     04-01  Mg     2.5     04-01    TPro  8.4<H>  /  Alb  3.8  /  TBili  0.3  /  DBili  x   /  AST  23  /  ALT  15  /  AlkPhos  103  04-01    Creatinine Trend: 1.63<--  PT/INR - ( 01 Apr 2021 02:35 )   PT: 14.7 sec;   INR: 1.31 ratio         PTT - ( 01 Apr 2021 02:35 )  PTT:41.7 sec      CXR with evidence of pulmonary vascular congestion,       ECG Sinus bradycardia no acute ST T abnormalities     ECHO:  Study Date: 1/8/2021  Conclusions:  1. Mechanical prosthetic mitral valve replacement. Peak mitral valve gradient equals 15 mm Hg, mean transmitral valve gradient equals 6 mm Hg, which is elevated even in  the setting of a mechanical prosthetic mitral valve replacement (Heart rate 69 bpm).  2. < from: TTE with Doppler (w/Cont) (01.06.21 @ 19:05) >    Endocardial visualization enhanced with intravenous injection of Ultrasonic Enhancing Agent (Definity).  Normal left ventricular systolic function.    Septal motion consistent with cardiac surgery, right ventricular overload.  3. Right atrial enlargement.  4. Right ventricular enlargement with decreased right ventricular systolic function.  5. An annuloplasty ring is seen in the tricuspid position. PG 3 mm hg, MG 1 mm hg. No tricuspid regurgitation.  6. Agitated saline injection demonstrates evidence of a patent foramen ovale/VSD.  No color Doppler evidence of VSD.            · Assessment      70 y.o. F with PMH of morbid obesity, CAD,  HFpEF (EF 65-60%), severe MR and TR s/p mechanical MV and TV repair (on coumadin), Afib s/p failed ablation, HTN, HLD, HTN, MIGUEL, COPD (on 3L O2), CKD3, gout, anemia (on iron pills) who presents with worsening dyspnea likely in the setting of acute on chronic heart failure exacerbation.     Problem/Plan - 1:  ·  Problem: Acute on chronic heart failure with normal ejection fraction.  Plan: -VILLA, orthopnea, and pulmonary vascular congestion -- symptoms are clinically consistent with ADHF exacerbation, less likely COPD exacerbation given no increase in cough, sputum production, or O2 requirement  -will diurese with IV Lasix 40 mg BID  -strict I's and O's  -daily weight  -repeat TTE.    Problem/Plan - 2:  ·  Problem: Bradycardia.  Plan: -currently in Afib with slow ventricular response  -will hold metoprolol and digoxin  -EP consult.    Problem/Plan - 3:  ·  Problem: Anemia.  Plan: -pt with normocytic anemia, currently 7.8, nearly at baseline of 8  -rectal exam without evidence of melena or BRBPR; suspect possibly in setting of PO iron use  -monitor CBC q12h   -f/u anemia w/u.     Problem/Plan - 4:  ·  Problem: HTN (hypertension).  Plan: -currently normotensive, hold ramipril.     Problem/Plan - 5:  ·  Problem: Mitral valve replaced.  Plan: -mechanical valve  -subtherapeutic INR; will do heparin bridge to coumadin.     Problem/Plan - 6:  Problem: COPD (chronic obstructive pulmonary disease). Plan: -c/w on 3L NC  -c/w Symbicort  -duonebs.    Problem/Plan - 7:  ·  Problem: Stage 3 chronic kidney disease, unspecified whether stage 3a or 3b CKD.  Plan: -baseline approximately 1.5-1.6, currently at baseline  -avoid  nephrotoxins and renally dose medications.     Problem/Plan - 8:  ·  Problem: Need for prophylactic measure.  Plan: -DVT ppx: heparin gtt  -Diet: DASH.        2.03

## 2022-06-21 NOTE — PRE-OP CHECKLIST - LAST TOOK
Patient arrives from home via EMS with c/o coughing 2-3 days, patient relates that her chest hurts only when she coughs. Denies any fevers.   
solids

## 2022-07-06 NOTE — ED ADULT NURSE NOTE - SKIN CAPILLARY REFILL
Health Maintenance Due   Topic Date Due   • Shingles Vaccine (1 of 2) Never done       Patient is due for topics as listed above but is not proceeding with above at this time.   2 seconds or less

## 2022-08-19 NOTE — PROGRESS NOTE ADULT - SUBJECTIVE AND OBJECTIVE BOX
Subjective: She feels fatigued today, which she attributes to the increased diuretics. She is urinating frequently. She remains orthopneic.     Medications:  acetaminophen   Tablet .. 650 milliGRAM(s) Oral every 6 hours PRN  albuterol/ipratropium for Nebulization 3 milliLiter(s) Nebulizer every 6 hours  allopurinol 100 milliGRAM(s) Oral daily  atorvastatin 40 milliGRAM(s) Oral at bedtime  buMETAnide Infusion 1 mG/Hr IV Continuous <Continuous>  heparin   Injectable 9000 Unit(s) IV Push every 6 hours PRN  heparin   Injectable 4000 Unit(s) IV Push every 6 hours PRN  heparin  Infusion. 1300 Unit(s)/Hr IV Continuous <Continuous>  lidocaine   Patch 1 Patch Transdermal every 24 hours  metoprolol tartrate 25 milliGRAM(s) Oral two times a day  pantoprazole    Tablet 40 milliGRAM(s) Oral before breakfast      Physical Exam:    Vital Signs Last 24 Hrs  T(C): 36.7 (01 Oct 2020 04:49), Max: 37.1 (30 Sep 2020 19:56)  T(F): 98.1 (01 Oct 2020 04:49), Max: 98.8 (30 Sep 2020 19:56)  HR: 70 (01 Oct 2020 09:33) (63 - 84)  BP: 122/72 (01 Oct 2020 04:49) (117/69 - 137/78)  RR: 18 (01 Oct 2020 04:49) (18 - 18)  SpO2: 94% (01 Oct 2020 09:33) (92% - 99%)    Daily Weight in k.7 (01 Oct 2020 08:22)    I&O's Summary    30 Sep 2020 07:  -  01 Oct 2020 07:00  --------------------------------------------------------  IN: 1284 mL / OUT: 3050 mL / NET: -1766 mL    01 Oct 2020 07:01  -  01 Oct 2020 12:21  --------------------------------------------------------  IN: 401 mL / OUT: 850 mL / NET: -449 mL    General: No distress. Comfortable.  HEENT: EOM intact.  Neck: Neck supple. JVP mildly elevated. No masses  Chest: Clear to auscultation bilaterally  CV: Normal S1 and S2. No murmurs, rub, or gallops. Radial pulses normal. Trace edema in the lower legs.   Abdomen: Soft, non-distended, non-tender  Skin: No rashes or skin breakdown  Neurology: Alert and oriented times three. Sensation intact  Psych: Affect normal    Labs:                        8.7    8.61  )-----------( 309      ( 01 Oct 2020 05:20 )             30.1     10-01    135  |  89<L>  |  79<H>  ----------------------------<  115<H>  4.3   |  35<H>  |  2.07<H>    Ca    10.9<H>      01 Oct 2020 05:20  Mg     1.9     09-30      PT/INR - ( 30 Sep 2020 08:20 )   PT: 13.9 sec;   INR: 1.19 ratio    PTT - ( 01 Oct 2020 09:10 )  PTT:77.2 sec (3) Not Aware of Limitations

## 2022-08-23 ENCOUNTER — INPATIENT (INPATIENT)
Facility: HOSPITAL | Age: 72
LOS: 14 days | Discharge: HOME | End: 2022-09-07
Attending: INTERNAL MEDICINE | Admitting: INTERNAL MEDICINE

## 2022-08-23 VITALS
RESPIRATION RATE: 25 BRPM | TEMPERATURE: 98 F | SYSTOLIC BLOOD PRESSURE: 145 MMHG | HEART RATE: 69 BPM | DIASTOLIC BLOOD PRESSURE: 75 MMHG | OXYGEN SATURATION: 99 % | HEIGHT: 64 IN

## 2022-08-23 DIAGNOSIS — Z95.4 PRESENCE OF OTHER HEART-VALVE REPLACEMENT: Chronic | ICD-10-CM

## 2022-08-23 LAB
BASE EXCESS BLDV CALC-SCNC: 14.4 MMOL/L — HIGH (ref -2–3)
BASOPHILS # BLD AUTO: 0.02 K/UL — SIGNIFICANT CHANGE UP (ref 0–0.2)
BASOPHILS NFR BLD AUTO: 0.2 % — SIGNIFICANT CHANGE UP (ref 0–2)
BLOOD GAS VENOUS COMPREHENSIVE RESULT: SIGNIFICANT CHANGE UP
CHLORIDE BLDV-SCNC: 88 MMOL/L — LOW (ref 96–108)
CO2 BLDV-SCNC: 44.5 MMOL/L — HIGH (ref 22–26)
EOSINOPHIL # BLD AUTO: 0.05 K/UL — SIGNIFICANT CHANGE UP (ref 0–0.5)
EOSINOPHIL NFR BLD AUTO: 0.6 % — SIGNIFICANT CHANGE UP (ref 0–6)
GAS PNL BLDV: 130 MMOL/L — LOW (ref 136–145)
GLUCOSE BLDV-MCNC: 488 MG/DL — CRITICAL HIGH (ref 70–99)
HCO3 BLDV-SCNC: 42 MMOL/L — HIGH (ref 22–29)
HCT VFR BLD CALC: 39.6 % — SIGNIFICANT CHANGE UP (ref 34.5–45)
HCT VFR BLDA CALC: 36 % — SIGNIFICANT CHANGE UP (ref 34.5–46.5)
HGB BLD CALC-MCNC: 12.1 G/DL — SIGNIFICANT CHANGE UP (ref 11.5–15.5)
HGB BLD-MCNC: 12.2 G/DL — SIGNIFICANT CHANGE UP (ref 11.5–15.5)
IANC: 6.72 K/UL — SIGNIFICANT CHANGE UP (ref 1.8–7.4)
IMM GRANULOCYTES NFR BLD AUTO: 0.5 % — SIGNIFICANT CHANGE UP (ref 0–1.5)
LACTATE BLDV-MCNC: 2 MMOL/L — SIGNIFICANT CHANGE UP (ref 0.5–2)
LYMPHOCYTES # BLD AUTO: 0.94 K/UL — LOW (ref 1–3.3)
LYMPHOCYTES # BLD AUTO: 11.3 % — LOW (ref 13–44)
MCHC RBC-ENTMCNC: 29.4 PG — SIGNIFICANT CHANGE UP (ref 27–34)
MCHC RBC-ENTMCNC: 30.8 GM/DL — LOW (ref 32–36)
MCV RBC AUTO: 95.4 FL — SIGNIFICANT CHANGE UP (ref 80–100)
MONOCYTES # BLD AUTO: 0.55 K/UL — SIGNIFICANT CHANGE UP (ref 0–0.9)
MONOCYTES NFR BLD AUTO: 6.6 % — SIGNIFICANT CHANGE UP (ref 2–14)
NEUTROPHILS # BLD AUTO: 6.72 K/UL — SIGNIFICANT CHANGE UP (ref 1.8–7.4)
NEUTROPHILS NFR BLD AUTO: 80.8 % — HIGH (ref 43–77)
NRBC # BLD: 0 /100 WBCS — SIGNIFICANT CHANGE UP (ref 0–0)
NRBC # FLD: 0 K/UL — SIGNIFICANT CHANGE UP (ref 0–0)
PCO2 BLDV: 70 MMHG — HIGH (ref 39–42)
PH BLDV: 7.39 — SIGNIFICANT CHANGE UP (ref 7.32–7.43)
PLATELET # BLD AUTO: 295 K/UL — SIGNIFICANT CHANGE UP (ref 150–400)
PO2 BLDV: 26 MMHG — SIGNIFICANT CHANGE UP
POTASSIUM BLDV-SCNC: 5.5 MMOL/L — HIGH (ref 3.5–5.1)
RBC # BLD: 4.15 M/UL — SIGNIFICANT CHANGE UP (ref 3.8–5.2)
RBC # FLD: 13.2 % — SIGNIFICANT CHANGE UP (ref 10.3–14.5)
SAO2 % BLDV: 42.5 % — SIGNIFICANT CHANGE UP
WBC # BLD: 8.32 K/UL — SIGNIFICANT CHANGE UP (ref 3.8–10.5)
WBC # FLD AUTO: 8.32 K/UL — SIGNIFICANT CHANGE UP (ref 3.8–10.5)

## 2022-08-23 PROCEDURE — 99285 EMERGENCY DEPT VISIT HI MDM: CPT | Mod: 25

## 2022-08-23 PROCEDURE — 71045 X-RAY EXAM CHEST 1 VIEW: CPT | Mod: 26

## 2022-08-23 PROCEDURE — 93010 ELECTROCARDIOGRAM REPORT: CPT

## 2022-08-23 RX ORDER — SODIUM CHLORIDE 9 MG/ML
500 INJECTION, SOLUTION INTRAVENOUS ONCE
Refills: 0 | Status: COMPLETED | OUTPATIENT
Start: 2022-08-23 | End: 2022-08-23

## 2022-08-23 NOTE — ED ADULT TRIAGE NOTE - GLASGOW COMA SCALE: BEST VERBAL RESPONSE, MLM
(V5) oriented
Spoke with pt regarding his diagnosis,he completed a proxy.States he can swallow his meds so he may not have the PEG.He does understand tube feeding but feels he doesn't need it.

## 2022-08-23 NOTE — ED PROVIDER NOTE - IV ALTEPLASE INCLUSION HIDDEN
"  SUBJECTIVE:                                                    Karli Claros is a 33 year old female who presents to clinic today for the following health issues:    HPI  Patient is a 33 year old female who presents with one day history of sore throat.  All four of her kids tested + for strep today.  She and  present to  for testing.  No fever or chills.      ROS:  Constitutional, HEENT, cardiovascular, pulmonary, gi and gu systems are negative, except as otherwise noted.    OBJECTIVE:                                                    BP 90/54  Pulse 83  Temp 98.8  F (37.1  C) (Oral)  Ht 5' 8\" (1.727 m)  Wt 144 lb (65.3 kg)  LMP 05/14/2017 (Exact Date)  SpO2 98%  BMI 21.9 kg/m2  Body mass index is 21.9 kg/(m^2).  GENERAL: healthy, alert and no distress  NECK: no adenopathy, no asymmetry, masses, or scars and thyroid normal to palpation  RESP: lungs clear to auscultation - no rales, rhonchi or wheezes  CV: regular rate and rhythm, normal S1 S2, no S3 or S4, no murmur, click or rub, no peripheral edema and peripheral pulses strong  ABDOMEN: soft, nontender, no hepatosplenomegaly, no masses and bowel sounds normal  MS: no gross musculoskeletal defects noted, no edema  OROPHARYX: Moist, no erythema or exudate    Diagnostic Test Results: RST negative  No results found for this or any previous visit (from the past 24 hour(s)).     ASSESSMENT/PLAN:                                                              ICD-10-CM    1. Throat pain R07.0 Strep, Rapid Screen     Beta strep group A culture   Advised of negative RST    Recommend supportive cares at this time.  Throat culture pending.  Rosi Robbins MD  [facility]  " show

## 2022-08-23 NOTE — ED ADULT TRIAGE NOTE - CHIEF COMPLAINT QUOTE
Pt BIBEMS for weakness and dizziness X 1 week. Denies chest pain, sob, abd pain, fevers/chills. On 3lpm NC at home. FS reading HIGH, x2 in triage. Pmhx: COPD, HTN, afib, CHF

## 2022-08-23 NOTE — ED PROVIDER NOTE - OBJECTIVE STATEMENT
72F PMH COPD home O2 3L, CHF, afib not on AC, mitral and tricuspid valve repair p/w few days of lightheadedness, generalized weakness, SOB. Endorsing 1 month of prednisone use BID. No f/c, chest pain, abd pain, urinary symptoms, numbness/tingling, leg swelling

## 2022-08-23 NOTE — ED ADULT NURSE NOTE - OBJECTIVE STATEMENT
Pt received to [facility], ambulatory self. C/O dizziness, weakness, SOB x1 week progressively worsening. Pt denies chest pain, N/V/D, or recent falls. Denies blood thinners. NSR on cardiac monitor, tachycardic at times with ambulation. Dyspneic on exertion, O2 98% on 3LNC. PMH COPD and AFib with ablation. L20G and R20G placed, labs drawn and sent. Pt awaiting Xray.

## 2022-08-23 NOTE — ED PROVIDER NOTE - CLINICAL SUMMARY MEDICAL DECISION MAKING FREE TEXT BOX
72F PMH COPD home O2 3L, CHF, afib not on AC, mitral and tricuspid valve repair p/w few days of lightheadedness, generalized weakness, SOB. Hyperglycemic, rest of VSS. Neuro intact, abd NT, no leg swelling, lungs ctab  Concern for ACS vs. CHF exacerbation. Will also eval for DKA, possibly due to steroid use  Plan: cardiac labs, dka labs, ivf, cxr, reassess symptoms 72F PMH COPD home O2 3L, CHF, afib not on AC, mitral and tricuspid valve repair p/w few days of lightheadedness, generalized weakness, SOB. Hyperglycemic, rest of VSS. Neuro intact, abd NT, no leg swelling, lungs ctab  Concern for ACS vs. CHF exacerbation. Will also eval for DKA, possibly due to steroid use  Plan: cardiac labs, dka labs, ivf, cxr, reassess symptoms    haughey: pt with extremely elevated bmi presents with glucose in 600 range.  pt has no known hx dm, on no med for the same.  pt has been taking prednisone on and off for at least a month, inconsistently when she " feels a tightness"  pt here with rhonchorous lung sounds.  pt also uses cpap, 72F PMH COPD home O2 3L, CHF, afib not on AC, mitral and tricuspid valve repair p/w few days of lightheadedness, generalized weakness, SOB. Hyperglycemia    will need admission, I performed a history and physical exam of the patient and discussed their management with the resident and /or advanced care provider. I reviewed the resident and /or ACP's note and agree with the documented findings and plan of care. My medical decison making and observations are found above.

## 2022-08-23 NOTE — ED PROVIDER NOTE - NS ED ROS FT
CONST: no fevers, no chills  ENT: no sore throat  CV: no chest pain, no leg swelling  RESP: +shortness of breath, no cough  ABD: no abdominal pain, no nausea, no vomiting, no diarrhea  : no dysuria, no flank pain, no hematuria  MSK: no back pain, no extremity pain  NEURO: +lightheadedness  SKIN:  no rash

## 2022-08-23 NOTE — ED PROVIDER NOTE - PHYSICAL EXAMINATION
Physical Exam:  General: A&Ox3 awake alert   HEENT: normal conjunctiva, oral mucosa moist  Lung: CTAB, no respiratory distress, no wheezes/rhonchi/rales B/L, speaking in full sentences  CV: RRR  Abd: soft, NT, ND  MSK: no visible deformities   Neuro: No focal sensory or motor deficits  Skin: Warm, well perfused, no rash, no leg swelling  ~Abram Granger MD (PGY-3)

## 2022-08-23 NOTE — ED PROVIDER NOTE - PROGRESS NOTE DETAILS
MARIA DEL CARMEN Dumont (PGY-3) - no dka, pt sugars improved w/ insulin. Labs w/ increased BNP but pt has known CHF on bumex, reporting no respiratory issues. Will admit.

## 2022-08-23 NOTE — ED PROVIDER NOTE - ATTENDING CONTRIBUTION TO CARE
power: pt with extremely elevated bmi presents with glucose in 600 range.  pt has no known hx dm, on no med for the same.  pt has been taking prednisone on and off for at least a month, inconsistently when she " feels a tightness"  pt here with rhonchorous lung sounds.  pt also uses cpap, 72F PMH COPD home O2 3L, CHF, afib not on AC, mitral and tricuspid valve repair p/w few days of lightheadedness, generalized weakness, SOB. Hyperglycemia    will need admission, I performed a history and physical exam of the patient and discussed their management with the resident and /or advanced care provider. I reviewed the resident and /or ACP's note and agree with the documented findings and plan of care. My medical decison making and observations are found above.

## 2022-08-24 DIAGNOSIS — Z29.9 ENCOUNTER FOR PROPHYLACTIC MEASURES, UNSPECIFIED: ICD-10-CM

## 2022-08-24 DIAGNOSIS — E11.9 TYPE 2 DIABETES MELLITUS WITHOUT COMPLICATIONS: ICD-10-CM

## 2022-08-24 DIAGNOSIS — I50.32 CHRONIC DIASTOLIC (CONGESTIVE) HEART FAILURE: ICD-10-CM

## 2022-08-24 DIAGNOSIS — I48.20 CHRONIC ATRIAL FIBRILLATION, UNSPECIFIED: ICD-10-CM

## 2022-08-24 DIAGNOSIS — E78.5 HYPERLIPIDEMIA, UNSPECIFIED: ICD-10-CM

## 2022-08-24 DIAGNOSIS — E11.65 TYPE 2 DIABETES MELLITUS WITH HYPERGLYCEMIA: ICD-10-CM

## 2022-08-24 DIAGNOSIS — N18.9 CHRONIC KIDNEY DISEASE, UNSPECIFIED: ICD-10-CM

## 2022-08-24 DIAGNOSIS — G47.33 OBSTRUCTIVE SLEEP APNEA (ADULT) (PEDIATRIC): ICD-10-CM

## 2022-08-24 DIAGNOSIS — I10 ESSENTIAL (PRIMARY) HYPERTENSION: ICD-10-CM

## 2022-08-24 DIAGNOSIS — J44.9 CHRONIC OBSTRUCTIVE PULMONARY DISEASE, UNSPECIFIED: ICD-10-CM

## 2022-08-24 LAB
A1C WITH ESTIMATED AVERAGE GLUCOSE RESULT: 10.7 % — HIGH (ref 4–5.6)
ALBUMIN SERPL ELPH-MCNC: 4.2 G/DL — SIGNIFICANT CHANGE UP (ref 3.3–5)
ALP SERPL-CCNC: 110 U/L — SIGNIFICANT CHANGE UP (ref 40–120)
ALT FLD-CCNC: 13 U/L — SIGNIFICANT CHANGE UP (ref 4–33)
ANION GAP SERPL CALC-SCNC: 10 MMOL/L — SIGNIFICANT CHANGE UP (ref 7–14)
ANION GAP SERPL CALC-SCNC: 13 MMOL/L — SIGNIFICANT CHANGE UP (ref 7–14)
ANION GAP SERPL CALC-SCNC: 8 MMOL/L — SIGNIFICANT CHANGE UP (ref 7–14)
APPEARANCE UR: CLEAR — SIGNIFICANT CHANGE UP
AST SERPL-CCNC: 27 U/L — SIGNIFICANT CHANGE UP (ref 4–32)
B-OH-BUTYR SERPL-SCNC: <0 MMOL/L — SIGNIFICANT CHANGE UP (ref 0–0.4)
B-OH-BUTYR SERPL-SCNC: <0 MMOL/L — SIGNIFICANT CHANGE UP (ref 0–0.4)
BACTERIA # UR AUTO: NEGATIVE — SIGNIFICANT CHANGE UP
BASE EXCESS BLDV CALC-SCNC: 12.2 MMOL/L — HIGH (ref -2–3)
BILIRUB SERPL-MCNC: 0.3 MG/DL — SIGNIFICANT CHANGE UP (ref 0.2–1.2)
BILIRUB UR-MCNC: NEGATIVE — SIGNIFICANT CHANGE UP
BUN SERPL-MCNC: 48 MG/DL — HIGH (ref 7–23)
BUN SERPL-MCNC: 49 MG/DL — HIGH (ref 7–23)
BUN SERPL-MCNC: 50 MG/DL — HIGH (ref 7–23)
CALCIUM SERPL-MCNC: 10.3 MG/DL — SIGNIFICANT CHANGE UP (ref 8.4–10.5)
CALCIUM SERPL-MCNC: 10.4 MG/DL — SIGNIFICANT CHANGE UP (ref 8.4–10.5)
CALCIUM SERPL-MCNC: 10.4 MG/DL — SIGNIFICANT CHANGE UP (ref 8.4–10.5)
CHLORIDE SERPL-SCNC: 85 MMOL/L — LOW (ref 98–107)
CHLORIDE SERPL-SCNC: 88 MMOL/L — LOW (ref 98–107)
CHLORIDE SERPL-SCNC: 92 MMOL/L — LOW (ref 98–107)
CO2 BLDV-SCNC: 43.9 MMOL/L — HIGH (ref 22–26)
CO2 SERPL-SCNC: 33 MMOL/L — HIGH (ref 22–31)
CO2 SERPL-SCNC: 34 MMOL/L — HIGH (ref 22–31)
CO2 SERPL-SCNC: 35 MMOL/L — HIGH (ref 22–31)
COLOR SPEC: SIGNIFICANT CHANGE UP
CREAT SERPL-MCNC: 1.48 MG/DL — HIGH (ref 0.5–1.3)
CREAT SERPL-MCNC: 1.51 MG/DL — HIGH (ref 0.5–1.3)
CREAT SERPL-MCNC: 1.76 MG/DL — HIGH (ref 0.5–1.3)
DIFF PNL FLD: NEGATIVE — SIGNIFICANT CHANGE UP
EGFR: 30 ML/MIN/1.73M2 — LOW
EGFR: 36 ML/MIN/1.73M2 — LOW
EGFR: 37 ML/MIN/1.73M2 — LOW
EPI CELLS # UR: 2 /HPF — SIGNIFICANT CHANGE UP (ref 0–5)
ESTIMATED AVERAGE GLUCOSE: 260 — SIGNIFICANT CHANGE UP
FLUAV AG NPH QL: SIGNIFICANT CHANGE UP
FLUBV AG NPH QL: SIGNIFICANT CHANGE UP
GAS PNL BLDV: SIGNIFICANT CHANGE UP
GLUCOSE BLDC GLUCOMTR-MCNC: 219 MG/DL — HIGH (ref 70–99)
GLUCOSE BLDC GLUCOMTR-MCNC: 308 MG/DL — HIGH (ref 70–99)
GLUCOSE BLDC GLUCOMTR-MCNC: 328 MG/DL — HIGH (ref 70–99)
GLUCOSE BLDC GLUCOMTR-MCNC: 342 MG/DL — HIGH (ref 70–99)
GLUCOSE BLDC GLUCOMTR-MCNC: 360 MG/DL — HIGH (ref 70–99)
GLUCOSE BLDC GLUCOMTR-MCNC: 497 MG/DL — CRITICAL HIGH (ref 70–99)
GLUCOSE BLDC GLUCOMTR-MCNC: 498 MG/DL — CRITICAL HIGH (ref 70–99)
GLUCOSE BLDC GLUCOMTR-MCNC: 557 MG/DL — CRITICAL HIGH (ref 70–99)
GLUCOSE SERPL-MCNC: 371 MG/DL — HIGH (ref 70–99)
GLUCOSE SERPL-MCNC: 461 MG/DL — CRITICAL HIGH (ref 70–99)
GLUCOSE SERPL-MCNC: 622 MG/DL — CRITICAL HIGH (ref 70–99)
GLUCOSE UR QL: ABNORMAL
HCO3 BLDV-SCNC: 42 MMOL/L — HIGH (ref 22–29)
KETONES UR-MCNC: NEGATIVE — SIGNIFICANT CHANGE UP
LEUKOCYTE ESTERASE UR-ACNC: NEGATIVE — SIGNIFICANT CHANGE UP
LIDOCAIN IGE QN: 111 U/L — HIGH (ref 7–60)
MAGNESIUM SERPL-MCNC: 2.3 MG/DL — SIGNIFICANT CHANGE UP (ref 1.6–2.6)
NITRITE UR-MCNC: NEGATIVE — SIGNIFICANT CHANGE UP
NT-PROBNP SERPL-SCNC: 2016 PG/ML — HIGH
OSMOLALITY SERPL: 316 MOSM/KG — HIGH (ref 275–295)
PCO2 BLDV: 77 MMHG — HIGH (ref 39–42)
PH BLDV: 7.34 — SIGNIFICANT CHANGE UP (ref 7.32–7.43)
PH UR: 6 — SIGNIFICANT CHANGE UP (ref 5–8)
PHOSPHATE SERPL-MCNC: 3.4 MG/DL — SIGNIFICANT CHANGE UP (ref 2.5–4.5)
PO2 BLDV: 44 MMHG — SIGNIFICANT CHANGE UP
POTASSIUM SERPL-MCNC: 4.5 MMOL/L — SIGNIFICANT CHANGE UP (ref 3.5–5.3)
POTASSIUM SERPL-MCNC: 5 MMOL/L — SIGNIFICANT CHANGE UP (ref 3.5–5.3)
POTASSIUM SERPL-MCNC: 5.2 MMOL/L — SIGNIFICANT CHANGE UP (ref 3.5–5.3)
POTASSIUM SERPL-SCNC: 4.5 MMOL/L — SIGNIFICANT CHANGE UP (ref 3.5–5.3)
POTASSIUM SERPL-SCNC: 5 MMOL/L — SIGNIFICANT CHANGE UP (ref 3.5–5.3)
POTASSIUM SERPL-SCNC: 5.2 MMOL/L — SIGNIFICANT CHANGE UP (ref 3.5–5.3)
PROT SERPL-MCNC: 8.5 G/DL — HIGH (ref 6–8.3)
PROT UR-MCNC: NEGATIVE — SIGNIFICANT CHANGE UP
RBC CASTS # UR COMP ASSIST: 1 /HPF — SIGNIFICANT CHANGE UP (ref 0–4)
RSV RNA NPH QL NAA+NON-PROBE: SIGNIFICANT CHANGE UP
SAO2 % BLDV: 72.4 % — SIGNIFICANT CHANGE UP
SARS-COV-2 RNA SPEC QL NAA+PROBE: SIGNIFICANT CHANGE UP
SODIUM SERPL-SCNC: 131 MMOL/L — LOW (ref 135–145)
SODIUM SERPL-SCNC: 132 MMOL/L — LOW (ref 135–145)
SODIUM SERPL-SCNC: 135 MMOL/L — SIGNIFICANT CHANGE UP (ref 135–145)
SP GR SPEC: 1.02 — SIGNIFICANT CHANGE UP (ref 1.01–1.05)
TROPONIN T, HIGH SENSITIVITY RESULT: 47 NG/L — SIGNIFICANT CHANGE UP
UROBILINOGEN FLD QL: SIGNIFICANT CHANGE UP
WBC UR QL: 1 /HPF — SIGNIFICANT CHANGE UP (ref 0–5)

## 2022-08-24 PROCEDURE — 76770 US EXAM ABDO BACK WALL COMP: CPT | Mod: 26

## 2022-08-24 PROCEDURE — 93306 TTE W/DOPPLER COMPLETE: CPT | Mod: 26

## 2022-08-24 PROCEDURE — 99223 1ST HOSP IP/OBS HIGH 75: CPT

## 2022-08-24 RX ORDER — INSULIN GLARGINE 100 [IU]/ML
21 INJECTION, SOLUTION SUBCUTANEOUS AT BEDTIME
Refills: 0 | Status: DISCONTINUED | OUTPATIENT
Start: 2022-08-25 | End: 2022-08-26

## 2022-08-24 RX ORDER — DEXTROSE 50 % IN WATER 50 %
15 SYRINGE (ML) INTRAVENOUS ONCE
Refills: 0 | Status: DISCONTINUED | OUTPATIENT
Start: 2022-08-24 | End: 2022-09-07

## 2022-08-24 RX ORDER — ENOXAPARIN SODIUM 100 MG/ML
40 INJECTION SUBCUTANEOUS EVERY 24 HOURS
Refills: 0 | Status: DISCONTINUED | OUTPATIENT
Start: 2022-08-24 | End: 2022-08-24

## 2022-08-24 RX ORDER — INSULIN LISPRO 100/ML
VIAL (ML) SUBCUTANEOUS EVERY 6 HOURS
Refills: 0 | Status: DISCONTINUED | OUTPATIENT
Start: 2022-08-24 | End: 2022-08-24

## 2022-08-24 RX ORDER — BUMETANIDE 0.25 MG/ML
1 INJECTION INTRAMUSCULAR; INTRAVENOUS
Refills: 0 | Status: DISCONTINUED | OUTPATIENT
Start: 2022-08-25 | End: 2022-08-27

## 2022-08-24 RX ORDER — INSULIN LISPRO 100/ML
VIAL (ML) SUBCUTANEOUS
Refills: 0 | Status: DISCONTINUED | OUTPATIENT
Start: 2022-08-24 | End: 2022-08-24

## 2022-08-24 RX ORDER — INSULIN LISPRO 100/ML
VIAL (ML) SUBCUTANEOUS EVERY 4 HOURS
Refills: 0 | Status: DISCONTINUED | OUTPATIENT
Start: 2022-08-24 | End: 2022-08-24

## 2022-08-24 RX ORDER — SODIUM CHLORIDE 9 MG/ML
1000 INJECTION, SOLUTION INTRAVENOUS
Refills: 0 | Status: DISCONTINUED | OUTPATIENT
Start: 2022-08-24 | End: 2022-08-26

## 2022-08-24 RX ORDER — INSULIN GLARGINE 100 [IU]/ML
10 INJECTION, SOLUTION SUBCUTANEOUS AT BEDTIME
Refills: 0 | Status: DISCONTINUED | OUTPATIENT
Start: 2022-08-24 | End: 2022-08-24

## 2022-08-24 RX ORDER — HYDRALAZINE HCL 50 MG
25 TABLET ORAL THREE TIMES A DAY
Refills: 0 | Status: DISCONTINUED | OUTPATIENT
Start: 2022-08-24 | End: 2022-08-25

## 2022-08-24 RX ORDER — DEXTROSE 50 % IN WATER 50 %
25 SYRINGE (ML) INTRAVENOUS ONCE
Refills: 0 | Status: DISCONTINUED | OUTPATIENT
Start: 2022-08-24 | End: 2022-09-07

## 2022-08-24 RX ORDER — DEXTROSE 50 % IN WATER 50 %
12.5 SYRINGE (ML) INTRAVENOUS ONCE
Refills: 0 | Status: DISCONTINUED | OUTPATIENT
Start: 2022-08-24 | End: 2022-09-07

## 2022-08-24 RX ORDER — HEPARIN SODIUM 5000 [USP'U]/ML
5000 INJECTION INTRAVENOUS; SUBCUTANEOUS EVERY 8 HOURS
Refills: 0 | Status: DISCONTINUED | OUTPATIENT
Start: 2022-08-24 | End: 2022-09-07

## 2022-08-24 RX ORDER — INSULIN GLARGINE 100 [IU]/ML
21 INJECTION, SOLUTION SUBCUTANEOUS ONCE
Refills: 0 | Status: DISCONTINUED | OUTPATIENT
Start: 2022-08-24 | End: 2022-08-24

## 2022-08-24 RX ORDER — FERROUS SULFATE 325(65) MG
325 TABLET ORAL DAILY
Refills: 0 | Status: DISCONTINUED | OUTPATIENT
Start: 2022-08-24 | End: 2022-09-07

## 2022-08-24 RX ORDER — BUDESONIDE AND FORMOTEROL FUMARATE DIHYDRATE 160; 4.5 UG/1; UG/1
2 AEROSOL RESPIRATORY (INHALATION)
Refills: 0 | Status: DISCONTINUED | OUTPATIENT
Start: 2022-08-24 | End: 2022-09-07

## 2022-08-24 RX ORDER — INSULIN LISPRO 100/ML
VIAL (ML) SUBCUTANEOUS AT BEDTIME
Refills: 0 | Status: DISCONTINUED | OUTPATIENT
Start: 2022-08-24 | End: 2022-08-25

## 2022-08-24 RX ORDER — INSULIN LISPRO 100/ML
5 VIAL (ML) SUBCUTANEOUS ONCE
Refills: 0 | Status: COMPLETED | OUTPATIENT
Start: 2022-08-24 | End: 2022-08-24

## 2022-08-24 RX ORDER — INSULIN GLARGINE 100 [IU]/ML
15 INJECTION, SOLUTION SUBCUTANEOUS ONCE
Refills: 0 | Status: DISCONTINUED | OUTPATIENT
Start: 2022-08-24 | End: 2022-08-24

## 2022-08-24 RX ORDER — INSULIN LISPRO 100/ML
VIAL (ML) SUBCUTANEOUS
Refills: 0 | Status: DISCONTINUED | OUTPATIENT
Start: 2022-08-24 | End: 2022-08-25

## 2022-08-24 RX ORDER — ALBUTEROL 90 UG/1
2 AEROSOL, METERED ORAL EVERY 6 HOURS
Refills: 0 | Status: DISCONTINUED | OUTPATIENT
Start: 2022-08-24 | End: 2022-09-07

## 2022-08-24 RX ORDER — INSULIN GLARGINE 100 [IU]/ML
15 INJECTION, SOLUTION SUBCUTANEOUS ONCE
Refills: 0 | Status: COMPLETED | OUTPATIENT
Start: 2022-08-24 | End: 2022-08-24

## 2022-08-24 RX ORDER — BUMETANIDE 0.25 MG/ML
1 INJECTION INTRAMUSCULAR; INTRAVENOUS
Refills: 0 | Status: DISCONTINUED | OUTPATIENT
Start: 2022-08-24 | End: 2022-08-24

## 2022-08-24 RX ORDER — INSULIN GLARGINE 100 [IU]/ML
16 INJECTION, SOLUTION SUBCUTANEOUS ONCE
Refills: 0 | Status: DISCONTINUED | OUTPATIENT
Start: 2022-08-24 | End: 2022-08-24

## 2022-08-24 RX ORDER — SIMVASTATIN 20 MG/1
10 TABLET, FILM COATED ORAL AT BEDTIME
Refills: 0 | Status: DISCONTINUED | OUTPATIENT
Start: 2022-08-24 | End: 2022-08-25

## 2022-08-24 RX ORDER — METOPROLOL TARTRATE 50 MG
50 TABLET ORAL DAILY
Refills: 0 | Status: DISCONTINUED | OUTPATIENT
Start: 2022-08-24 | End: 2022-09-07

## 2022-08-24 RX ORDER — GLUCAGON INJECTION, SOLUTION 0.5 MG/.1ML
1 INJECTION, SOLUTION SUBCUTANEOUS ONCE
Refills: 0 | Status: DISCONTINUED | OUTPATIENT
Start: 2022-08-24 | End: 2022-09-07

## 2022-08-24 RX ORDER — SODIUM CHLORIDE 9 MG/ML
1000 INJECTION, SOLUTION INTRAVENOUS
Refills: 0 | Status: DISCONTINUED | OUTPATIENT
Start: 2022-08-24 | End: 2022-09-07

## 2022-08-24 RX ORDER — SODIUM CHLORIDE 0.65 %
1 AEROSOL, SPRAY (ML) NASAL
Qty: 0 | Refills: 0 | DISCHARGE

## 2022-08-24 RX ORDER — PANTOPRAZOLE SODIUM 20 MG/1
40 TABLET, DELAYED RELEASE ORAL
Refills: 0 | Status: DISCONTINUED | OUTPATIENT
Start: 2022-08-24 | End: 2022-09-07

## 2022-08-24 RX ADMIN — Medication 325 MILLIGRAM(S): at 14:26

## 2022-08-24 RX ADMIN — SODIUM CHLORIDE 500 MILLILITER(S): 9 INJECTION, SOLUTION INTRAVENOUS at 00:45

## 2022-08-24 RX ADMIN — INSULIN GLARGINE 15 UNIT(S): 100 INJECTION, SOLUTION SUBCUTANEOUS at 18:35

## 2022-08-24 RX ADMIN — SIMVASTATIN 10 MILLIGRAM(S): 20 TABLET, FILM COATED ORAL at 23:07

## 2022-08-24 RX ADMIN — HEPARIN SODIUM 5000 UNIT(S): 5000 INJECTION INTRAVENOUS; SUBCUTANEOUS at 14:27

## 2022-08-24 RX ADMIN — Medication 25 MILLIGRAM(S): at 14:26

## 2022-08-24 RX ADMIN — Medication 2: at 18:09

## 2022-08-24 RX ADMIN — Medication 6: at 11:30

## 2022-08-24 RX ADMIN — Medication 5 UNIT(S): at 00:45

## 2022-08-24 RX ADMIN — Medication 2: at 21:21

## 2022-08-24 RX ADMIN — Medication 25 MILLIGRAM(S): at 23:07

## 2022-08-24 RX ADMIN — Medication 8: at 14:36

## 2022-08-24 RX ADMIN — HEPARIN SODIUM 5000 UNIT(S): 5000 INJECTION INTRAVENOUS; SUBCUTANEOUS at 23:07

## 2022-08-24 RX ADMIN — SODIUM CHLORIDE 75 MILLILITER(S): 9 INJECTION, SOLUTION INTRAVENOUS at 14:44

## 2022-08-24 NOTE — H&P ADULT - PROBLEM SELECTOR PLAN 8
DVT ppx: heparin Sub8  Diet: CC Diet   Dispo: pending improvement in blood sugars - not on any AC, s/p Ablation  - c/w metoprolol 50 qd

## 2022-08-24 NOTE — CONSULT NOTE ADULT - SUBJECTIVE AND OBJECTIVE BOX
[facility] NEPHROLOGY- CONSULTATION NOTE    72 year old Female with history of below presents with weakness. Nephrology consulted for elevated Scr.    Patient known to our practice for h/o CKD-3b on prior admissions with baseline Scr 1.4-1.6. Patient had been on bumex 1 mg PO twice daily as an outpatient for h/o CHF which was continued on admission. Patient also started on IVF for hyperglycemia and concern for orthostatic hypotension.    REVIEW OF SYSTEMS:  Gen: no changes in weight, + weakness  HEENT: no rhinorrhea  Neck: no sore throat  Cards: no chest pain  Resp: + dyspnea with exertion (chronic as per patient @ baseline)  GI: no nausea or vomiting or diarrhea  : no dysuria or hematuria  Vascular: no LE edema  Derm: no rashes  Neuro: no numbness/tingling    No Known Allergies      Home Medications Reviewed  Hospital Medications:   MEDICATIONS  (STANDING):  acetaminophen     Tablet .. 975 milliGRAM(s) Oral once  budesonide 160 MICROgram(s)/formoterol 4.5 MICROgram(s) Inhaler 2 Puff(s) Inhalation two times a day  ferrous    sulfate 325 milliGRAM(s) Oral daily  heparin   Injectable 5000 Unit(s) SubCutaneous every 8 hours  hydrALAZINE 25 milliGRAM(s) Oral every 8 hours  lidocaine   4% Patch 1 Patch Transdermal every 24 hours  metoprolol succinate ER 25 milliGRAM(s) Oral daily  pantoprazole    Tablet 40 milliGRAM(s) Oral before breakfast  simvastatin 10 milliGRAM(s) Oral at bedtime      PAST MEDICAL & SURGICAL HISTORY:  Atrial fibrillation  S/P Ablation    Pulmonary hypertension    MIGUEL (obstructive sleep apnea)    COPD (chronic obstructive pulmonary disease)  on home O2    CHF (congestive heart failure)    HTN (hypertension)    Gout    Mitral valve replaced    Tricuspid valve replaced    CHF (congestive heart failure)    Hypertension    COPD (chronic obstructive pulmonary disease)    History of mitral valve replacement with mechanical valve    Tricuspid valve replaced  mechanical    No significant past surgical history        FAMILY HISTORY:  Family history of hypertension (Father, Sibling)    Family history of stroke    Family history of hypertension (Mother)        SOCIAL HISTORY:  Denies toxic substance use       VITALS:  T(F): 98.2 (22 @ 11:15), Max: 98.6 (22 @ 01:15)  HR: 94 (22 @ 11:15)  BP: 132/82 (22 @ 11:15)  RR: 20 (22 @ 11:15)  SpO2: 100% (22 @ 11:15)  Wt(kg): --    Height (cm): 162.6 ( @ 20:35)      PHYSICAL EXAM:  Gen: NAD, calm  HEENT: dry MM  Neck: no JVD  Cards: RRR, +S1/S2, no M/G/R  Resp: CTA B/L  GI: soft, NT/ND, NABS  : no CVA tenderness  Vascular: no LE edema B/L  Derm: no rashes  Neuro: non-focal      LABS:      131<L>  |  85<L>  |  50<H>  ----------------------------<  622<HH>  5.2   |  33<H>  |  1.76<H>    Ca    10.3      23 Aug 2022 23:18    TPro  8.5<H>  /  Alb  4.2  /  TBili  0.3  /  DBili      /  AST  27  /  ALT  13  /  AlkPhos  110      Creatinine Trend: 1.76 <--                        12.2   8.32  )-----------( 295      ( 23 Aug 2022 23:18 )             39.6     Urine Studies:  Urinalysis Basic - ( 24 Aug 2022 00:28 )    Color: Light Yellow / Appearance: Clear / S.021 / pH:   Gluc:  / Ketone: Negative  / Bili: Negative / Urobili: <2 mg/dL   Blood:  / Protein: Negative / Nitrite: Negative   Leuk Esterase: Negative / RBC: 1 /HPF / WBC 1 /HPF   Sq Epi:  / Non Sq Epi: 2 /HPF / Bacteria: Negative        < from: Xray Chest 1 View- PORTABLE-Urgent (Xray Chest 1 View- PORTABLE-Urgent .) (22 @ 23:31) >  IMPRESSION:  Cardiomegaly with clear lungs.    --- End of Report ---      < end of copied text >

## 2022-08-24 NOTE — ED ADULT NURSE REASSESSMENT NOTE - NS ED NURSE REASSESS COMMENT FT1
Break RN note- patient resting quietly in bed, breathing even and nonlabored on 3L. FS as documented. Patient reports her dizziness has improved. Patient appears comfortable. No acute distress. Safety maintained. Patient stable upon exiting the room.
Receiving patient from break RN. Pt AxOx4, lying comfortably in stretcher. NSR on cardiac monitor. Rechecked . Denies any current complaints. Stretcher in lowest position, safety maintained.
report given to [facility] 3 RN. A&Ox4. ambulatory. assisted w/ commode at bedside. NAD. pt denies SOB, chest pain, dizziness, weakness, urinary symptoms, HA, n/v/d, fevers, chills. respirations are even and un labored.  safety precautions maintained.
report received from overnight RN Yamile. RADHA&Ox4. NAD.  pt denies SOB, chest pain, dizziness, weakness, urinary symptoms, HA, n/v/d, fevers, chills. respirations are even and un labored. abd is soft and non tender. skin intact. 20g IV placed prior to shift. IV is paten and w/o adverse affects. safety precautions maintained.

## 2022-08-24 NOTE — PATIENT PROFILE ADULT - FUNCTIONAL ASSESSMENT - BASIC MOBILITY 6.
2-calculated by average/Not able to assess (calculate score using [facility] averaging method)

## 2022-08-24 NOTE — CONSULT NOTE ADULT - ATTENDING COMMENTS
A/P: 71 y/o F w/ COPD on home O2(3L) with severe pHTN, OHS/MIGUEL on home biPAP, HTN, HLD admitted for hyperglycemia >600 with c/f HHS.  Endocrine consulted for management of hyperglycemia/new T2DM.        Uncontrolled DM2  marked hyperglycemia  pt without weight until this evening - start weight based insulin as outlined above, though would reccomed we get glucose less <250 1st before we let pt eat and also administer lantus now   she can eat dinner, would prefer to correct marked hyperglycemia that the pt has now 1st  Agree with basal/bolus plan as outlined, adjust as plan above     Endocrine team consulted for uncontrolled diabetes. Patient is high risk with high level decision making due to uncontrolled diabetes which places patient at high risk for cardiovascular and cerebrovascular events. Patient with lability of glucose requiring close monitoring and insulin adjustments.        Lilo Jarrell MD  Attending Physician   Department of Endocrinology, Diabetes and Metabolism     Pager  [phone] [please provide 10 digit call back number]  LISIMON 9-5  [email or web address]  Nights and weekends: [phone]  Please note that this patient may be followed by a different provider tomorrow.   If no answer or after hours, please contact [phone].  For final dc reccomendations, please call [phone] or page the endocrine fellow on call.

## 2022-08-24 NOTE — H&P ADULT - PROBLEM SELECTOR PLAN 4
- uses BiPAP at night, unsure of home setting   - - last EF 2020 50%, with notable severe pHTN (last in 2021)   - will repeat TTE   - c/w bumex 1mg BID

## 2022-08-24 NOTE — H&P ADULT - PROBLEM SELECTOR PLAN 3
- last EF 2020 50%, no sings of CHF exacerbation  - will repeat TTE   - c/w bumex 1mg BID - Cr 1.76, baseline appears (1.3-1.5)  - likely in setting of osmotic diuresis 2/2 to hyperglycemia, causing prerenal azotemia (BUN/Cr ratio >20)  - received 500cc fluids in ED  - will give gentle IVF

## 2022-08-24 NOTE — H&P ADULT - NSHPPHYSICALEXAM_GEN_ALL_CORE
PHYSICAL EXAM:  General: Alert and cooperative. Mild distress, accessory muscle use able to speak in full sentences.    Head: Normocephalic, no mass or lesions.  Eyes: Intact visual fields. clear conjunctiva, no ptosis.   Throat: Oral cavity and pharynx normal. No inflammation, swelling, exudate, or lesions. Teeth and gingiva in good general condition.  Respiratory: Mild wheezing noted upper lobes bilaterally, No crackles noted on exam   Cardiovascular: S1/S2 auscultated. Irregular rhythm. No murmurs, clicks noted on exam hx of MVR and TVR No peripheral edema, cyanosis, or pallor. Extremities warm and well perfused. Capillary refill <2 seconds.   Abdomen: Soft, non-tender, nondistended, no guarding or rebound tenderness. Active bowel sounds in all 4 quadrants. No hepatosplenomegaly.  Musculoskeletal: Adequately aligned spine. ROM intact. No joint erythema or tenderness. Normal muscular development.   Extremities: No significant deformity or joint abnormality. No varicosities.    Skin: Intact, no rash. Dry skin.   Neurological: AOAx3. CN2-12 grossly intact. Strength and sensation symmetric and intact throughout. Reflexes 2+ throughout.   Psychiatry: Oriented to person, place, and time. Able to demonstrate good judgement and reason, without hallucinations, abnormal affect or abnormal behaviors during the examination. Patient is not suicidal. PHYSICAL EXAM:  General: Alert and cooperative. Mild distress, accessory muscle use able to speak in full sentences. Obese  Head: Normocephalic, no mass or lesions.  Eyes: Intact visual fields. clear conjunctiva, no ptosis.   Throat: Oral cavity and pharynx normal. No inflammation, swelling, exudate, or lesions. Teeth and gingiva in good general condition.  Respiratory: Mild wheezing noted upper lobes bilaterally, No crackles noted on exam   Cardiovascular: S1/S2 auscultated. Irregular rhythm. No murmurs, clicks noted on exam hx of MVR and TVR. No peripheral edema, cyanosis, or pallor. Extremities warm and well perfused. Capillary refill <2 seconds.   Abdomen: Soft, non-tender, nondistended, no guarding or rebound tenderness. Active bowel sounds in all 4 quadrants. No hepatosplenomegaly.  Musculoskeletal: Adequately aligned spine. ROM intact. No joint erythema or tenderness. Normal muscular development.   Extremities: No significant deformity or joint abnormality. No varicosities.    Skin: Intact, no rash. Dry skin.   Neurological: AOAx3. CN2-12 grossly intact. Strength and sensation symmetric and intact throughout. Reflexes 2+ throughout.   Psychiatry: Oriented to person, place, and time. Able to demonstrate good judgement and reason, without hallucinations, abnormal affect or abnormal behaviors during the examination. Patient is not suicidal.

## 2022-08-24 NOTE — CONSULT NOTE ADULT - ASSESSMENT
A/P: yo male/female with hx of ................................... here with ..........    -Hannah Boogie, PGY-1    NOTE INCOMPLETE UNTIL SIGNED BY ATTENDING   A/P: 71 y/o F w/ COPD on home O2(3L) with severe pHTN, OHS/MIGUEL on home biPAP, HTN, HLD admitted for hyperglycemia >600 with c/f HHS.  Endocrine consulted for management of hyperglycemia.  Patient does not meet full criteria for HHS (serum glc >600, plasma osmolality >320, AMS), but of concern is that she may further deteriorate and that her insulin requirements may require more frequent monitoring than can be provided on medicine floors.    1. Hyperglycemia/DM2  - continue IVF  - NPO, q6h fs  -       2. Steroid use  - Patient intermittently using prednisone 10mg bid for SOB, last dose 2 days ago.  - BPs stable, steroid withdrawal symptoms of less concern  - Continue to hold prednisone, current presentation of new hyperglycemia is likely 2/2 steroid use.      3. HTN  - on bumex 1mg bid, hydralazine 25mg tid, metoprolol 50mg qd, goal <130/80 and >100/60  - outpt mc/cr    4. HLD  - on simvastatin 10mg qd  - outpatient lipid panel    -Hannah Boogie, PGY-1    NOTE INCOMPLETE UNTIL SIGNED BY ATTENDING   A/P: 71 y/o F w/ COPD on home O2(3L) with severe pHTN, OHS/MIGUEL on home biPAP, HTN, HLD admitted for hyperglycemia >600 with c/f HHS.  Endocrine consulted for management of hyperglycemia/new T2DM.  Patient does not meet full criteria for HHS (serum glc >600, plasma osmolality >320, AMS), and is responding adequately to insulin.      1. Hyperglycemia/DM2  - continue IVF  - NPO, q6h fs, can start on diet once glc < 250  - Please get standing weight for this patient so that recs can be made for insulin dosing           2. Steroid use  - Patient intermittently using prednisone 10mg for SOB, last dose 2 days ago.  - BPs stable, steroid withdrawal symptoms of less concern  - Continue to hold prednisone, current presentation of new hyperglycemia is likely 2/2 steroid use.      3. HTN  - on bumex 1mg bid, hydralazine 25mg tid, metoprolol 50mg qd, goal <130/80 and >100/60  - outpt mc/cr    4. HLD  - on simvastatin 10mg qd  - outpatient lipid panel    -Hannah Boogie, PGY-1    NOTE INCOMPLETE UNTIL SIGNED BY ATTENDING   A/P: 71 y/o F w/ COPD on home O2(3L) with severe pHTN, OHS/MIGUEL on home biPAP, HTN, HLD admitted for hyperglycemia >600 with c/f HHS.  Endocrine consulted for management of hyperglycemia/new T2DM.  Patient does not meet full criteria for HHS (serum glc >600, plasma osmolality >320, AMS), and is responding adequately to insulin.      1. Hyperglycemia/DM2  - continue IVF  - NPO, q6h fs, can start on diet once glc < 250  - Please get standing weight for this patient so that recs can be made for insulin dosing   - Glucose Target 100 -180 mg/dl; Above goal  - Please check fingersticks before meals and at bedtime   - hypoglycemia protocol in place   - Nutrition consult   - C-peptide 1.9 wnl , anti-GAD65, anti-Islet cell antibody, anti-Zinc transporter 8 (specimen received); please follow up results   - Provider to Rn: Insulin pen administration and glucometer teaching prior to discharge. Please send glucometer to [facility] and teach pt with her own glucometer prior to discharge.          2. Steroid use  - Patient intermittently using prednisone 10mg for SOB, last dose 2 days ago.  - BPs stable, steroid withdrawal symptoms of less concern  - Continue to hold prednisone, current presentation of new hyperglycemia is likely 2/2 steroid use.      3. HTN  - on bumex 1mg bid, hydralazine 25mg tid, goal <130/80 and >100/60  - outpt mc/cr    4. HLD  - on simvastatin 10mg qd  - outpatient lipid panel    -Hannah Boogie, PGY-1    NOTE INCOMPLETE UNTIL SIGNED BY ATTENDING   A/P: 71 y/o F w/ COPD on home O2(3L) with severe pHTN, OHS/MIGUEL on home biPAP, HTN, HLD admitted for hyperglycemia >600 with c/f HHS.  Endocrine consulted for management of hyperglycemia/new T2DM.  Patient does not meet full criteria for HHS (serum glc >600, plasma osmolality >320, AMS), and is responding adequately to insulin.  Please see following recs:    1. Hyperglycemia/DM2  - continue IVF  - NPO, q6h fs, can start on diet once glc < 250  - moderate Admelog ISS q6h, low-dose Admelog ISS at bedtime   - Please get standing weight for this patient so that recs can be made for insulin dosing   - Glucose Target 100 -180 mg/dl; Above goal  - hypoglycemia protocol in place   - Nutrition consult  - Provider to Rn: Insulin pen administration and glucometer teaching prior to discharge. Please send glucometer to [facility] and teach pt with her own glucometer prior to discharge.    DC planning  - ____ lantus at bedtime and ____ Admelog premeal  -Discuss with patient the importance of Carb consistent diet and exercise as tolerated.    -Recommend nutritional consultation  inpt prior to dc   -Discuss glycemic goal of a1c <7% to prevent microvascular complications of diabetes mellitus and reduce the risk of macrovascular complications.  - Make sure patient knows how to inject insulin and check fingersticks with glucometer (ask bedside RN for teaching)  - pt on kinetics and action of basal and prandial insulin.    Pt should call their doctor when FSBG <70 or above >400 and or consistently above 200s as changes in the regimen will have to be made.  -pt should call PMD for DM related questions or concerns until pt is seen by CDE or endocrine   -Recommend annual podiatry and ophthalmology follow up.       If patient wishes to follow up with [facility]     [facility]  [address] [place] [address], [address], [place] [address]  [phone]   Please call to schedule appointment with CDE/Nutritionist and MD appointment next available   Ensure patient has working glucometer, test strips, lancets, alcohol pads, and BD sharan pen needles  Please also prescribe glucose tabs, Baqsimi nasal spray or glucagon emergency kit for hypoglycemia risk         2. Steroid use  - Patient intermittently using prednisone 10mg for SOB, last dose 2 days ago.  - BPs stable, steroid withdrawal symptoms of less concern  - Continue to hold prednisone, current presentation of new hyperglycemia is likely 2/2 steroid use.      3. HTN  - on bumex 1mg bid, hydralazine 25mg tid, goal <130/80 and >100/60  - outpt mc/cr    4. HLD  - on simvastatin 10mg qd  - outpatient lipid panel    -Hannah Boogie, PGY-1    NOTE INCOMPLETE UNTIL SIGNED BY ATTENDING   A/P: 71 y/o F w/ COPD on home O2(3L) with severe pHTN, OHS/MIGUEL on home biPAP, HTN, HLD admitted for hyperglycemia >600 with c/f HHS.  Endocrine consulted for management of hyperglycemia/new T2DM.  Patient does not meet full criteria for HHS (serum glc >600, plasma osmolality >320, AMS), and is responding adequately to insulin.  Please see following recs:    1. Hyperglycemia/DM2  - Please get standing weight for this patient so that recs can be made for insulin dosing   - continue IVF  - NPO, q6h fs, can start on consistent carb diet once glc < 250 with pre-meal and bedtime fs  - for estimated weight 80kg: lantus 16u (please give ASAP) and Admelog 5u premeal  - low dose Admelog ISS q6h, low-dose Admelog ISS at bedtime   - if glc < 250 on 8/24 evening and started on diet in time for dinner:        - do not give correctional dose, only 5u Admelog pre-dinner       - for all other meals, low dose Admelog ISS pre-meal, low-dose Admelog ISS at bedtime    - Glucose Target 100 -180 mg/dl; Above goal  - hypoglycemia protocol in place   - Nutrition consult  - Provider to Rn: Insulin pen administration and glucometer teaching prior to discharge. Please send glucometer to [facility] and teach pt with her own glucometer prior to discharge.    DC planning  - ____ lantus at bedtime and ____ Admelog premeal  -Discuss with patient the importance of Carb consistent diet and exercise as tolerated.    -Recommend nutritional consultation  inpt prior to dc   -Discuss glycemic goal of a1c <7% to prevent microvascular complications of diabetes mellitus and reduce the risk of macrovascular complications.  - Make sure patient knows how to inject insulin and check fingersticks with glucometer (ask bedside RN for teaching)  - pt on kinetics and action of basal and prandial insulin.    Pt should call their doctor when FSBG <70 or above >400 and or consistently above 200s as changes in the regimen will have to be made.  -pt should call PMD for DM related questions or concerns until pt is seen by CDE or endocrine   -Recommend annual podiatry and ophthalmology follow up.       If patient wishes to follow up with [facility]in[facility]     [facility]  [address] [place] [address] [address], [place] [address]  [phone]   Please call to schedule appointment with CDE/Nutritionist and MD appointment next available   Ensure patient has working glucometer, test strips, lancets, alcohol pads, and BD sharan pen needles  Please also prescribe glucose tabs, Baqsimi nasal spray or glucagon emergency kit for hypoglycemia risk         2. Steroid use  - Patient intermittently using prednisone 10mg for SOB, last dose 2 days ago.  - BPs stable, steroid withdrawal symptoms of less concern  - Continue to hold prednisone, current presentation of new hyperglycemia is likely 2/2 steroid use.      3. HTN  - on bumex 1mg bid, hydralazine 25mg tid, goal <130/80 and >100/60  - outpt mc/cr    4. HLD  - on simvastatin 10mg qd  - outpatient lipid panel    -Hannah Boogie, PGY-1    NOTE INCOMPLETE UNTIL SIGNED BY ATTENDING   A/P: 71 y/o F w/ COPD on home O2(3L) with severe pHTN, OHS/MIGUEL on home biPAP, HTN, HLD admitted for hyperglycemia >600 with c/f HHS.  Endocrine consulted for management of hyperglycemia/new T2DM.  Patient does not meet full criteria for HHS (serum glc >600, plasma osmolality >320, AMS), and is responding adequately to insulin.  Please see following recs:    1. Hyperglycemia/DM2  - Please get standing weight for this patient so that recs can be made for insulin dosing   - continue IVF  - NPO, q6h fs, can start on consistent carb diet once glc < 250 with pre-meal and bedtime fs  - for estimated weight 80kg: lantus 16u (please give ASAP) and Admelog 5u premeal  - low dose Admelog ISS q4h, low-dose Admelog ISS at bedtime   - if glc < 250 on 8/24 evening and started on diet in time for dinner:        - do not give correctional dose, only 5u Admelog pre-dinner       - for all other meals, low dose Admelog ISS pre-meal, low-dose Admelog ISS at bedtime    - Glucose Target 100 -180 mg/dl; Above goal  - hypoglycemia protocol in place   - Nutrition consult  - Provider to Rn: Insulin pen administration and glucometer teaching prior to discharge. Please send glucometer to [facility] and teach pt with her own glucometer prior to discharge.    DC planning  - ____ lantus at bedtime and ____ Admelog premeal  -Discuss with patient the importance of Carb consistent diet and exercise as tolerated.    -Recommend nutritional consultation  inpt prior to dc   -Discuss glycemic goal of a1c <7% to prevent microvascular complications of diabetes mellitus and reduce the risk of macrovascular complications.  - Make sure patient knows how to inject insulin and check fingersticks with glucometer (ask bedside RN for teaching)  - pt on kinetics and action of basal and prandial insulin.    Pt should call their doctor when FSBG <70 or above >400 and or consistently above 200s as changes in the regimen will have to be made.  -pt should call PMD for DM related questions or concerns until pt is seen by CDE or endocrine   -Recommend annual podiatry and ophthalmology follow up.       If patient wishes to follow up with [facility] Endocrinology     [facility]  [address] [place] [address] [address], [place] [address]  [phone]   Please call to schedule appointment with CDE/Nutritionist and MD appointment next available   Ensure patient has working glucometer, test strips, lancets, alcohol pads, and BD sharan pen needles  Please also prescribe glucose tabs, Baqsimi nasal spray or glucagon emergency kit for hypoglycemia risk         2. Steroid use  - Patient intermittently using prednisone 10mg for SOB, last dose 2 days ago.  - BPs stable, steroid withdrawal symptoms of less concern  - Continue to hold prednisone, current presentation of new hyperglycemia is likely 2/2 steroid use.      3. HTN  - on bumex 1mg bid, hydralazine 25mg tid, goal <130/80 and >100/60  - outpt mc/cr    4. HLD  - on simvastatin 10mg qd  - outpatient lipid panel    -Hannah Boogie, PGY-1    NOTE INCOMPLETE UNTIL SIGNED BY ATTENDING   A/P: 71 y/o F w/ COPD on home O2(3L) with severe pHTN, OHS/MIGUEL on home biPAP, HTN, HLD admitted for hyperglycemia >600 with c/f HHS.  Endocrine consulted for management of hyperglycemia/new T2DM.  Patient does not meet full criteria for HHS (serum glc >600, plasma osmolality >320, AMS), and is responding adequately to insulin.  Please see following recs:    1. Hyperglycemia/DM2  - Standing weight: 107kg   - continue IVF  - NPO, q6h fs, can start on consistent carb diet once glc < 250 with pre-meal and bedtime fs  - lantus 21u (please give STAT) and Admelog 7u premeal  - low dose Admelog ISS q4h, low-dose Admelog ISS at bedtime   - if glc < 250 on 8/24 evening and started on diet in time for dinner:        - do not give correctional dose, only 5u Admelog pre-dinner       - for all other meals, low dose Admelog ISS pre-meal, low-dose Admelog ISS at bedtime    - Glucose Target 100 -180 mg/dl; Above goal  - hypoglycemia protocol in place   - Nutrition consult  - Provider to Rn: Insulin pen administration and glucometer teaching prior to discharge. Please send glucometer to [facility] and teach pt with her own glucometer prior to discharge.    DC planning  - ____ lantus at bedtime and ____ Admelog premeal  -Discuss with patient the importance of Carb consistent diet and exercise as tolerated.    -Recommend nutritional consultation  inpt prior to dc   -Discuss glycemic goal of a1c <7% to prevent microvascular complications of diabetes mellitus and reduce the risk of macrovascular complications.  - Make sure patient knows how to inject insulin and check fingersticks with glucometer (ask bedside RN for teaching)  - pt on kinetics and action of basal and prandial insulin.    Pt should call their doctor when FSBG <70 or above >400 and or consistently above 200s as changes in the regimen will have to be made.  -pt should call PMD for DM related questions or concerns until pt is seen by CDE or endocrine   -Recommend annual podiatry and ophthalmology follow up.       If patient wishes to follow up with [facility] Endocrinology     [facility]  [address] [place] [address], [address], [place], [place] [id number] [phone]   Please call to schedule appointment with CDE/Nutritionist and MD appointment next available   Ensure patient has working glucometer, test strips, lancets, alcohol pads, and BD sharan pen needles  Please also prescribe glucose tabs, Baqsimi nasal spray or glucagon emergency kit for hypoglycemia risk         2. Steroid use  - Patient intermittently using prednisone 10mg for SOB, last dose 2 days ago.  - BPs stable, steroid withdrawal symptoms of less concern  - Continue to hold prednisone, current presentation of new hyperglycemia is likely 2/2 steroid use.      3. HTN  - on bumex 1mg bid, hydralazine 25mg tid, goal <130/80 and >100/60  - outpt mc/cr    4. HLD  - on simvastatin 10mg qd  - outpatient lipid panel    -Hannah Boogie, PGY-1    NOTE INCOMPLETE UNTIL SIGNED BY ATTENDING   A/P: 73 y/o F w/ COPD on home O2(3L) with severe pHTN, OHS/MIGUEL on home biPAP, HTN, HLD admitted for hyperglycemia >600 with c/f HHS.  Endocrine consulted for management of hyperglycemia/new T2DM.  Patient does not meet full criteria for HHS (serum glc >600, plasma osmolality >320, AMS), and is responding adequately to insulin.  Please see following recs:    1. Hyperglycemia/DM2  - Standing weight: 107kg   - continue IVF  - NPO, q6h fs, can start on consistent carb diet once glc < 250 with pre-meal and bedtime fs  - lantus 21u (please give STAT) and Admelog 7u premeal  - low dose Admelog ISS q4h, low-dose Admelog ISS at bedtime   - if glc < 250 on 8/24 evening and started on diet in time for dinner:        - do not give correctional dose, only 7u Admelog pre-dinner       - for all other meals, low dose Admelog ISS pre-meal, low-dose Admelog ISS at bedtime    - Glucose Target 100 -180 mg/dl; Above goal  - hypoglycemia protocol in place   - Nutrition consult  - Provider to Rn: Insulin pen administration and glucometer teaching prior to discharge. Please send glucometer to [facility] and teach pt with her own glucometer prior to discharge.    DC planning  - ____ lantus at bedtime and ____ Admelog premeal  -Discuss with patient the importance of Carb consistent diet and exercise as tolerated.    -Recommend nutritional consultation  inpt prior to dc   -Discuss glycemic goal of a1c <7% to prevent microvascular complications of diabetes mellitus and reduce the risk of macrovascular complications.  - Make sure patient knows how to inject insulin and check fingersticks with glucometer (ask bedside RN for teaching)  - pt on kinetics and action of basal and prandial insulin.    Pt should call their doctor when FSBG <70 or above >400 and or consistently above 200s as changes in the regimen will have to be made.  -pt should call PMD for DM related questions or concerns until pt is seen by CDE or endocrine   -Recommend annual podiatry and ophthalmology follow up.       If patient wishes to follow up with [facility] Endocrinology     [facility]  [address] [place] [address], [address], [place], [place] [id number] [phone]   Please call to schedule appointment with CDE/Nutritionist and MD appointment next available   Ensure patient has working glucometer, test strips, lancets, alcohol pads, and BD sharan pen needles  Please also prescribe glucose tabs, Baqsimi nasal spray or glucagon emergency kit for hypoglycemia risk         2. Steroid use  - Patient intermittently using prednisone 10mg for SOB, last dose 2 days ago.  - BPs stable, steroid withdrawal symptoms of less concern  - Continue to hold prednisone, current presentation of new hyperglycemia is likely 2/2 steroid use.      3. HTN  - on bumex 1mg bid, hydralazine 25mg tid, goal <130/80 and >100/60  - outpt mc/cr    4. HLD  - on simvastatin 10mg qd  - outpatient lipid panel    -Hannah Boogie, PGY-1    NOTE INCOMPLETE UNTIL SIGNED BY ATTENDING   A/P: 71 y/o F w/ COPD on home O2(3L) with severe pHTN, OHS/MIGUEL on home biPAP, HTN, HLD admitted for hyperglycemia >600 with c/f HHS.  Endocrine consulted for management of hyperglycemia/new T2DM.  Patient does not meet full criteria for HHS (serum glc >600, plasma osmolality >320, AMS), and is responding adequately to insulin.  Please see following recs:    1. Hyperglycemia/DM2  - Standing weight: 107kg   - continue IVF  - NPO, q6h fs, can start on consistent carb diet once glc < 250 with pre-meal   - lantus 21u (please give STAT than q24) and Admelog 7u premeal  - if glc < 250 on 8/24 evening and started on diet in time for dinner:        - do not give correctional dose, only 7u Admelog pre-dinner       - for all other meals, stop q4 hr scale and start low dose Admelog ISS pre-meal, low-dose Admelog ISS at bedtime    - Glucose Target 100 -180 mg/dl; Above goal  - hypoglycemia protocol in place   - Nutrition consult  - Provider to Rn: Insulin pen administration and glucometer teaching prior to discharge. Please send glucometer to [facility] and teach pt with her own glucometer prior to discharge.    DC planning  - ____ lantus at bedtime and ____ Admelog premeal  -Discuss with patient the importance of Carb consistent diet and exercise as tolerated.    -Recommend nutritional consultation  inpt prior to dc   -Discuss glycemic goal of a1c <7% to prevent microvascular complications of diabetes mellitus and reduce the risk of macrovascular complications.  - Make sure patient knows how to inject insulin and check fingersticks with glucometer (ask bedside RN for teaching)  - pt on kinetics and action of basal and prandial insulin.    Pt should call their doctor when FSBG <70 or above >400 and or consistently above 200s as changes in the regimen will have to be made.  -pt should call PMD for DM related questions or concerns until pt is seen by CDE or endocrine   -Recommend annual podiatry and ophthalmology follow up.       If patient wishes to follow up with [facility]ocrinology     [facility]  [address] [place] [address], [address], [place] [address]  [phone]   Please call to schedule appointment with CDE/Nutritionist and MD appointment next available   Ensure patient has working glucometer, test strips, lancets, alcohol pads, and BD sharan pen needles  Please also prescribe glucose tabs, Baqsimi nasal spray or glucagon emergency kit for hypoglycemia risk         2. Steroid use  - Patient intermittently using prednisone 10mg for SOB, last dose 2 days ago.  - BPs stable, steroid withdrawal symptoms of less concern  - Continue to hold prednisone, current presentation of new hyperglycemia is likely 2/2 steroid use  she is not on long  term steroids therefore risk of secondary AI is decreased       3. HTN  - on bumex 1mg bid, hydralazine 25mg tid, goal <130/80 and >100/60  - outpt mc/cr    4. HLD  - on simvastatin 10mg qd  - outpatient lipid panel    -Hannah Boogie, PGY-1

## 2022-08-24 NOTE — H&P ADULT - PROBLEM SELECTOR PLAN 1
- patient with polyuria, polydipsia, Blood sugar >600, BHB negative, normal AG not consistent with DKA, likely HHS in setting of intermittent use of steroids for COPD (10mg BID)  - patient has been on and off steroids since January for COPD exacerbations, steroid induced diabetes vs latent T2DM onset/  - HgbA1c 10.7, (previous A1c 5.6(2021), 5.8(2020)); was prescribed empagliflozin earlier in year( per [employer]) but no diagnosis of diabetes in past?  - will start on 10 units qhs with ISS, based on requirements can adjust accordingly  - Endo consult   - f/u MARY, Islet cell antibody, c-peptide       - endocrine consult given new onset

## 2022-08-24 NOTE — CONSULT NOTE ADULT - SUBJECTIVE AND OBJECTIVE BOX
HPI:  73 y/o F with pmhx of COPD on home O2(3L) with severe pHTN, OHS/MIGUEL on home biPAP, HTN, HLD who presents with 5 days of lightheadedness and fatigue. Patient states that she has been feeling off for the last few days .Endorse increased fatigue stating she feels "very slow".  She state that at times she feels like the room is spinning.  She denies any fevers, chills, headaches chest pain or increased cough. She has SOB at baseline and uses 3L O2 continuously she denies any increased SOB. She endorse vision changes, stating that "everything seems darker even when there is light outside". She denies any blurry vision or loss of vision. She also endorse polydipsia nad polyuria. Denies polyphagia but does endorse decreased appetite. She states that she sleeps with 3 pillows at night which is normal for her. Also endorse nocturnal dyspnea that has improved since she has been using her bipap at night.   Patient states that she has been using prednisone intermittently for the last month for SOB. She states she was prescribed 10mg BID and states she only takes the medication when she is feeling more dyspnea that her baseline. She states her last dose was 2 days ago. She states that her symptoms progressively worsened to the pint that she felt helpless and decided to call EMS. In EMS her sugars were >500.     In the ED T. BS were measures >600 x2. She was given 5 units of insulin with improvement to 300s. BHB negative. Admitted to medicine for further management.     (24 Aug 2022 08:35)      PAST MEDICAL & SURGICAL HISTORY:  Atrial fibrillation  S/P Ablation      Pulmonary hypertension      MIGUEL (obstructive sleep apnea)      COPD (chronic obstructive pulmonary disease)  on home O2      CHF (congestive heart failure)      HTN (hypertension)      Gout      Mitral valve replaced      Tricuspid valve replaced      CHF (congestive heart failure)      Hypertension      COPD (chronic obstructive pulmonary disease)      History of mitral valve replacement with mechanical valve      Tricuspid valve replaced  mechanical          FAMILY HISTORY:  Family history of hypertension (Father, Sibling)    Family history of stroke    Family history of hypertension (Mother)        Social History:  Tobacco  ETOH  Illicits  Occupation    Outpatient Medications:    MEDICATIONS  (STANDING):  budesonide 160 MICROgram(s)/formoterol 4.5 MICROgram(s) Inhaler 2 Puff(s) Inhalation two times a day  dextrose 5%. 1000 milliLiter(s) (100 mL/Hr) IV Continuous <Continuous>  dextrose 5%. 1000 milliLiter(s) (50 mL/Hr) IV Continuous <Continuous>  dextrose 50% Injectable 25 Gram(s) IV Push once  dextrose 50% Injectable 12.5 Gram(s) IV Push once  dextrose 50% Injectable 25 Gram(s) IV Push once  ferrous    sulfate 325 milliGRAM(s) Oral daily  glucagon  Injectable 1 milliGRAM(s) IntraMuscular once  heparin   Injectable 5000 Unit(s) SubCutaneous every 8 hours  hydrALAZINE 25 milliGRAM(s) Oral three times a day  insulin glargine Injectable (LANTUS) 10 Unit(s) SubCutaneous at bedtime  insulin lispro (ADMELOG) corrective regimen sliding scale   SubCutaneous at bedtime  insulin lispro (ADMELOG) corrective regimen sliding scale   SubCutaneous three times a day before meals  lactated ringers. 1000 milliLiter(s) (75 mL/Hr) IV Continuous <Continuous>  metoprolol succinate ER 50 milliGRAM(s) Oral daily  pantoprazole    Tablet 40 milliGRAM(s) Oral before breakfast  simvastatin 10 milliGRAM(s) Oral at bedtime    MEDICATIONS  (PRN):  ALBUTerol    90 MICROgram(s) HFA Inhaler 2 Puff(s) Inhalation every 6 hours PRN Shortness of Breath and/or Wheezing  dextrose Oral Gel 15 Gram(s) Oral once PRN Blood Glucose LESS THAN 70 milliGRAM(s)/deciliter      Allergies    No Known Allergies    Intolerances      Review of Systems:  Constitutional: No fever, good appetite/po intake  Eyes: No blurry vision, diplopia  Neuro: No tremors  HEENT: No pain  Cardiovascular: No chest pain, palpitations  Respiratory: No SOB, no cough  GI: No nausea, vomiting,   : No dysuria, hematuria  Skin: no rash  Psych: no depression  Endocrine: no polyuria, polydipsia  Hem/lymph: no swelling  Osteoporosis: no fractures    ALL OTHER SYSTEMS REVIEWED AND NEGATIVE    UNABLE TO OBTAIN    PHYSICAL EXAM:  VITALS: T(C): 36.8 (08-24-22 @ 11:15)  T(F): 98.2 (08-24-22 @ 11:15), Max: 98.6 (08-24-22 @ 01:15)  HR: 94 (08-24-22 @ 11:15) (69 - 101)  BP: 132/82 (08-24-22 @ 11:15) (111/58 - 157/91)  RR:  (18 - 25)  SpO2:  (98% - 100%)  Wt(kg): --  GENERAL: NAD, well-groomed, well-developed  EYES: No proptosis, no lid lag, anicteric  HEENT:  Atraumatic, Normocephalic, moist mucous membranes  THYROID: Normal size, no palpable nodules  RESPIRATORY: Clear to auscultation bilaterally; No rales, rhonchi, wheezing, or rubs  CARDIOVASCULAR: Regular rate and rhythm; No murmurs; no peripheral edema  GI: Soft, nontender, non distended, normal bowel sounds  SKIN: Dry, intact, No rashes or lesions  NEURO: sensation intact, extraocular movements intact, no tremor, normal reflexes  PSYCH: reactive affect, euthymic mood  CUSHING'S SIGNS: no striae    POCT Blood Glucose.: 557 mg/dL (08-24-22 @ 11:47)  POCT Blood Glucose.: 498 mg/dL (08-24-22 @ 11:45)  POCT Blood Glucose.: 497 mg/dL (08-24-22 @ 11:08)  POCT Blood Glucose.: 308 mg/dL (08-24-22 @ 09:09)  POCT Blood Glucose.: 360 mg/dL (08-24-22 @ 04:28)  POCT Blood Glucose.: 455 mg/dL (08-24-22 @ 02:18)  POCT Blood Glucose.: 555 mg/dL (08-24-22 @ 00:41)  POCT Blood Glucose.: 592 mg/dL (08-23-22 @ 23:02)  POCT Blood Glucose.: >600 mg/dL (08-23-22 @ 20:41)  POCT Blood Glucose.: >600 mg/dL (08-23-22 @ 20:40)                            12.2   8.32  )-----------( 295      ( 23 Aug 2022 23:18 )             39.6       08-23    131<L>  |  85<L>  |  50<H>  ----------------------------<  622<HH>  5.2   |  33<H>  |  1.76<H>    eGFR: 30<L>    Ca    10.3      08-23    TPro  8.5<H>  /  Alb  4.2  /  TBili  0.3  /  DBili  x   /  AST  27  /  ALT  13  /  AlkPhos  110  08-23      Thyroid Function Tests:              Radiology:                HPI:    71 y/o F w/ COPD on home O2(3L) with severe pHTN, OHS/MIGUEL on home biPAP, HTN, HLD admitted for hyperglycemia >600 with c/f HHS.  Per chart review, patient previously has been using prednisone 10mg bid intermittently for past month for SOB and only takes when she is feeling more SOB from baseline.  Last prednisone dose 2 days ago.  Began experiencing lightheadedness and fatigue, dizziness, polydipsia, polyuria, vision changes x5 days.      FS glc >600 in ED, given 5u insulin with improvement to 300s, fs glc increased to 400s, given 6u insulin, repeat fs in 500s.  Patient also given IVF, NPO.  Plasma osmolality 311.  Normal pH on VBG with HCO3 33, BHB 0.  A1c 10.7.  Endocrine consulted for management of new hyperglycemia with c/f HHS iso prednisone use.      PAST MEDICAL & SURGICAL HISTORY:  Atrial fibrillation  S/P Ablation      Pulmonary hypertension      MIGUEL (obstructive sleep apnea)      COPD (chronic obstructive pulmonary disease)  on home O2      CHF (congestive heart failure)      HTN (hypertension)      Gout      Mitral valve replaced      Tricuspid valve replaced      CHF (congestive heart failure)      Hypertension      COPD (chronic obstructive pulmonary disease)      History of mitral valve replacement with mechanical valve      Tricuspid valve replaced  mechanical          FAMILY HISTORY:  Family history of hypertension (Father, Sibling)    Family history of stroke    Family history of hypertension (Mother)        Social History:  Tobacco  ETOH  Illicits  Occupation    Outpatient Medications:  · 	predniSONE 10 mg oral tablet: 2 tab(s) orally once a day for one more day on 1/15  	1 tab (10mg) once a day for 3 more days starting 1/16,17.18 then discontinue  · 	predniSONE 10 mg oral tablet: 2 tab(s) orally once a day for one more day on 1/15  	1 tab (10mg) once a day for 3 more days starting 1/16  · 	benzonatate 100 mg oral capsule: 1 cap(s) orally every 8 hours as needed for cough  · 	budesonide 0.5 mg/2 mL inhalation suspension: 1 puff(s) inhaled 2 times a day   · 	bumetanide 1 mg oral tablet: 1 tab(s) orally 2 times a day  · 	metoprolol succinate 50 mg oral tablet, extended release: 1 tab(s) orally once a day  · 	aluminum hydroxide-magnesium hydroxide 200 mg-200 mg/5 mL oral suspension: 30 milliliter(s) orally every 4 hours, As needed, Dyspepsia  · 	acetaminophen 325 mg oral tablet: 2 tab(s) orally every 6 hours, As needed, Moderate Pain (4 - 6)  · 	hydrALAZINE 25 mg oral tablet: 1 tab(s) orally 3 times a day  · 	Protonix 40 mg oral delayed release tablet: 1 tab(s) orally once a day   · 	budesonide-formoterol 160 mcg-4.5 mcg/inh inhalation aerosol: 2 puff(s) inhaled 2 times a day   · 	simvastatin 10 mg oral tablet: 1 tab(s) orally once a day (at bedtime)  · 	sodium chloride nasal spray: 1 spray(s) nasal every 6 hours  · 	ferrous sulfate 324 mg (65 mg elemental iron) oral tablet: 1 tab(s) orally once a day  · 	ProAir HFA 90 mcg/inh inhalation aerosol: 2 puff(s) inhaled every 6 hours  MEDICATIONS  (STANDING):  budesonide 160 MICROgram(s)/formoterol 4.5 MICROgram(s) Inhaler 2 Puff(s) Inhalation two times a day  dextrose 5%. 1000 milliLiter(s) (100 mL/Hr) IV Continuous <Continuous>  dextrose 5%. 1000 milliLiter(s) (50 mL/Hr) IV Continuous <Continuous>  dextrose 50% Injectable 25 Gram(s) IV Push once  dextrose 50% Injectable 12.5 Gram(s) IV Push once  dextrose 50% Injectable 25 Gram(s) IV Push once  ferrous    sulfate 325 milliGRAM(s) Oral daily  glucagon  Injectable 1 milliGRAM(s) IntraMuscular once  heparin   Injectable 5000 Unit(s) SubCutaneous every 8 hours  hydrALAZINE 25 milliGRAM(s) Oral three times a day  insulin glargine Injectable (LANTUS) 10 Unit(s) SubCutaneous at bedtime  insulin lispro (ADMELOG) corrective regimen sliding scale   SubCutaneous at bedtime  insulin lispro (ADMELOG) corrective regimen sliding scale   SubCutaneous three times a day before meals  lactated ringers. 1000 milliLiter(s) (75 mL/Hr) IV Continuous <Continuous>  metoprolol succinate ER 50 milliGRAM(s) Oral daily  pantoprazole    Tablet 40 milliGRAM(s) Oral before breakfast  simvastatin 10 milliGRAM(s) Oral at bedtime    MEDICATIONS  (PRN):  ALBUTerol    90 MICROgram(s) HFA Inhaler 2 Puff(s) Inhalation every 6 hours PRN Shortness of Breath and/or Wheezing  dextrose Oral Gel 15 Gram(s) Oral once PRN Blood Glucose LESS THAN 70 milliGRAM(s)/deciliter      Allergies    No Known Allergies    Intolerances      Review of Systems:  Constitutional: No fever, good appetite/po intake  Eyes: No blurry vision, diplopia  Neuro: No tremors  HEENT: No pain  Cardiovascular: No chest pain, palpitations  Respiratory: No SOB, no cough  GI: No nausea, vomiting,   : No dysuria, hematuria  Skin: no rash  Psych: no depression  Endocrine: no polyuria, polydipsia  Hem/lymph: no swelling  Osteoporosis: no fractures    ALL OTHER SYSTEMS REVIEWED AND NEGATIVE    UNABLE TO OBTAIN    PHYSICAL EXAM:  VITALS: T(C): 36.8 (08-24-22 @ 11:15)  T(F): 98.2 (08-24-22 @ 11:15), Max: 98.6 (08-24-22 @ 01:15)  HR: 94 (08-24-22 @ 11:15) (69 - 101)  BP: 132/82 (08-24-22 @ 11:15) (111/58 - 157/91)  RR:  (18 - 25)  SpO2:  (98% - 100%)  Wt(kg): --  GENERAL: NAD, well-groomed, well-developed  EYES: No proptosis, no lid lag, anicteric  HEENT:  Atraumatic, Normocephalic, moist mucous membranes  THYROID: Normal size, no palpable nodules  RESPIRATORY: Clear to auscultation bilaterally; No rales, rhonchi, wheezing, or rubs  CARDIOVASCULAR: Regular rate and rhythm; No murmurs; no peripheral edema  GI: Soft, nontender, non distended, normal bowel sounds  SKIN: Dry, intact, No rashes or lesions  NEURO: sensation intact, extraocular movements intact, no tremor, normal reflexes  PSYCH: reactive affect, euthymic mood  CUSHING'S SIGNS: no striae    POCT Blood Glucose.: 557 mg/dL (08-24-22 @ 11:47)  POCT Blood Glucose.: 498 mg/dL (08-24-22 @ 11:45)  POCT Blood Glucose.: 497 mg/dL (08-24-22 @ 11:08)  POCT Blood Glucose.: 308 mg/dL (08-24-22 @ 09:09)  POCT Blood Glucose.: 360 mg/dL (08-24-22 @ 04:28)  POCT Blood Glucose.: 455 mg/dL (08-24-22 @ 02:18)  POCT Blood Glucose.: 555 mg/dL (08-24-22 @ 00:41)  POCT Blood Glucose.: 592 mg/dL (08-23-22 @ 23:02)  POCT Blood Glucose.: >600 mg/dL (08-23-22 @ 20:41)  POCT Blood Glucose.: >600 mg/dL (08-23-22 @ 20:40)                            12.2   8.32  )-----------( 295      ( 23 Aug 2022 23:18 )             39.6       08-23    131<L>  |  85<L>  |  50<H>  ----------------------------<  622<HH>  5.2   |  33<H>  |  1.76<H>    eGFR: 30<L>    Ca    10.3      08-23    TPro  8.5<H>  /  Alb  4.2  /  TBili  0.3  /  DBili  x   /  AST  27  /  ALT  13  /  AlkPhos  110  08-23      Thyroid Function Tests:              Radiology:                HPI:    71 y/o F w/ COPD on home O2(3L) with severe pHTN, OHS/MIGUEL on home biPAP, HTN, HLD admitted for hyperglycemia >600 with c/f HHS.  Patient previously has been using prednisone 10mg intermittently for past month for SOB and only takes when she is feeling more SOB from baseline, states that she only uses it ~3x/week.  Last prednisone dose 2 days ago.  Began experiencing lightheadedness and fatigue, dizziness, polydipsia, polyuria, vision changes x5 days.      FS glc >600 in ED, given 5u insulin with improvement to 300s, fs glc increased to 400s, given 6u insulin, repeat fs 3 hours 340s.  Patient also given IVF, NPO.  Plasma osmolality 311.  Normal pH on VBG with HCO3 33, BHB 0.  A1c 10.7 (2021 A1c 5.6).  Endocrine consulted for management of new hyperglycemia/T2DM with c/f HHS iso prednisone use.    Patient states that at home she eats a varied diet, including grits, eggs, chicken, bananas, fruit, yogurt, Roe beef nikia.  Does not have an endocrinologist or ophthalmologist, but follows with a podiatrist.        PAST MEDICAL & SURGICAL HISTORY:  Atrial fibrillation  S/P Ablation      Pulmonary hypertension      MIGUEL (obstructive sleep apnea)      COPD (chronic obstructive pulmonary disease)  on home O2        HTN (hypertension)      Gout        Hypertension      COPD (chronic obstructive pulmonary disease)      History of mitral valve replacement with mechanical valve      Tricuspid valve replaced  mechanical      FAMILY HISTORY:  Family history of hypertension (Father, Sibling)    Family history of stroke    Family history of hypertension (Mother)        Social History:  Patient used to work in the OR, 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      Outpatient Medications:  · 	predniSONE 10 mg oral tablet: 2 tab(s) orally once a day for one more day on 1/15  	1 tab (10mg) once a day for 3 more days starting 1/16,17.18 then discontinue  · 	predniSONE 10 mg oral tablet: 2 tab(s) orally once a day for one more day on 1/15  	1 tab (10mg) once a day for 3 more days starting 1/16  · 	benzonatate 100 mg oral capsule: 1 cap(s) orally every 8 hours as needed for cough  · 	budesonide 0.5 mg/2 mL inhalation suspension: 1 puff(s) inhaled 2 times a day   · 	bumetanide 1 mg oral tablet: 1 tab(s) orally 2 times a day  · 	aluminum hydroxide-magnesium hydroxide 200 mg-200 mg/5 mL oral suspension: 30 milliliter(s) orally every 4 hours, As needed, Dyspepsia  · 	acetaminophen 325 mg oral tablet: 2 tab(s) orally every 6 hours, As needed, Moderate Pain (4 - 6)  · 	hydrALAZINE 25 mg oral tablet: 1 tab(s) orally 3 times a day  · 	Protonix 40 mg oral delayed release tablet: 1 tab(s) orally once a day   · 	budesonide-formoterol 160 mcg-4.5 mcg/inh inhalation aerosol: 2 puff(s) inhaled 2 times a day   · 	simvastatin 10 mg oral tablet: 1 tab(s) orally once a day (at bedtime)  · 	sodium chloride nasal spray: 1 spray(s) nasal every 6 hours  · 	ferrous sulfate 324 mg (65 mg elemental iron) oral tablet: 1 tab(s) orally once a day  · 	ProAir HFA 90 mcg/inh inhalation aerosol: 2 puff(s) inhaled every 6 hours  MEDICATIONS  (STANDING):  budesonide 160 MICROgram(s)/formoterol 4.5 MICROgram(s) Inhaler 2 Puff(s) Inhalation two times a day  dextrose 5%. 1000 milliLiter(s) (100 mL/Hr) IV Continuous <Continuous>  dextrose 5%. 1000 milliLiter(s) (50 mL/Hr) IV Continuous <Continuous>  dextrose 50% Injectable 25 Gram(s) IV Push once  dextrose 50% Injectable 12.5 Gram(s) IV Push once  dextrose 50% Injectable 25 Gram(s) IV Push once  ferrous    sulfate 325 milliGRAM(s) Oral daily  glucagon  Injectable 1 milliGRAM(s) IntraMuscular once  heparin   Injectable 5000 Unit(s) SubCutaneous every 8 hours  hydrALAZINE 25 milliGRAM(s) Oral three times a day  insulin glargine Injectable (LANTUS) 10 Unit(s) SubCutaneous at bedtime  insulin lispro (ADMELOG) corrective regimen sliding scale   SubCutaneous at bedtime  insulin lispro (ADMELOG) corrective regimen sliding scale   SubCutaneous three times a day before meals  lactated ringers. 1000 milliLiter(s) (75 mL/Hr) IV Continuous <Continuous>  metoprolol succinate ER 50 milliGRAM(s) Oral daily  pantoprazole    Tablet 40 milliGRAM(s) Oral before breakfast  simvastatin 10 milliGRAM(s) Oral at bedtime    MEDICATIONS  (PRN):  ALBUTerol    90 MICROgram(s) HFA Inhaler 2 Puff(s) Inhalation every 6 hours PRN Shortness of Breath and/or Wheezing  dextrose Oral Gel 15 Gram(s) Oral once PRN Blood Glucose LESS THAN 70 milliGRAM(s)/deciliter      Allergies    No Known Allergies    Intolerances      Review of Systems:  Constitutional: (+) weakness, No fever, good appetite/po intake  Eyes: (+) blurry vision, no diplopia  Neuro: No tremors  HEENT: (+) dizziness, No pain  Cardiovascular: No chest pain, palpitations  Respiratory: (+) SOB, no cough  GI: No nausea, vomiting, changes in bowel movements  : (+) polyuria, No dysuria, hematuria  Skin: no rash  Psych: no depression, anxiety  Endocrine: (+) polyuria, polydipsia  Hem/lymph: no swelling  Osteoporosis: no fractures    ALL OTHER SYSTEMS REVIEWED AND NEGATIVE    UNABLE TO OBTAIN    PHYSICAL EXAM:  VITALS: T(C): 36.8 (08-24-22 @ 11:15)  T(F): 98.2 (08-24-22 @ 11:15), Max: 98.6 (08-24-22 @ 01:15)  HR: 94 (08-24-22 @ 11:15) (69 - 101)  BP: 132/82 (08-24-22 @ 11:15) (111/58 - 157/91)  RR:  (18 - 25)  SpO2:  (98% - 100%)  Wt(kg): --  GENERAL: NAD, obese elderly woman sitting in bed  EYES: No proptosis, no lid lag, anicteric, PERRL  HEENT:  Atraumatic, Normocephalic, moist mucous membranes  THYROID: Normal size, no palpable nodules  RESPIRATORY: diffuse wheezing, distant breath sounds  CARDIOVASCULAR: Regular rate and rhythm; distant heart sounds, No murmurs;   GI: Soft, nontender, non distended, normal bowel sounds  SKIN: Dry, intact, No rashes or lesions, skin tenting  NEURO: sensation intact, extraocular movements intact, no tremor, normal reflexes  PSYCH: reactive affect, euthymic mood  CUSHING'S SIGNS: no striae    POCT Blood Glucose.: 557 mg/dL (08-24-22 @ 11:47)  POCT Blood Glucose.: 498 mg/dL (08-24-22 @ 11:45)  POCT Blood Glucose.: 497 mg/dL (08-24-22 @ 11:08)  POCT Blood Glucose.: 308 mg/dL (08-24-22 @ 09:09)  POCT Blood Glucose.: 360 mg/dL (08-24-22 @ 04:28)  POCT Blood Glucose.: 455 mg/dL (08-24-22 @ 02:18)  POCT Blood Glucose.: 555 mg/dL (08-24-22 @ 00:41)  POCT Blood Glucose.: 592 mg/dL (08-23-22 @ 23:02)  POCT Blood Glucose.: >600 mg/dL (08-23-22 @ 20:41)  POCT Blood Glucose.: >600 mg/dL (08-23-22 @ 20:40)                            12.2   8.32  )-----------( 295      ( 23 Aug 2022 23:18 )             39.6       08-23    131<L>  |  85<L>  |  50<H>  ----------------------------<  622<HH>  5.2   |  33<H>  |  1.76<H>    eGFR: 30<L>    Ca    10.3      08-23    TPro  8.5<H>  /  Alb  4.2  /  TBili  0.3  /  DBili  x   /  AST  27  /  ALT  13  /  AlkPhos  110  08-23      Thyroid Function Tests:              Radiology:                HPI:    73 y/o F w/ COPD on home O2(3L) with severe pHTN, OHS/MIGUEL on home biPAP, HTN, HLD admitted for hyperglycemia >600 with c/f HHS.  Patient previously has been using prednisone 10mg intermittently for past month for SOB and only takes when she is feeling more SOB from baseline, states that she only uses it ~3x/week.  Last prednisone dose 2 days ago.  Began experiencing lightheadedness and fatigue, dizziness, polydipsia, polyuria, vision changes x5 days.      FS glc >600 in ED, given 5u insulin with improvement to 300s, fs glc increased to 400s, given 6u insulin, repeat fs 3 hours 340s.  Patient also given IVF, NPO.  Plasma osmolality 311.  Normal pH on VBG with HCO3 33, BHB 0, no AG.  A1c 10.7 (2021 A1c 5.6).  Lactate 2.0.  Endocrine consulted for management of new hyperglycemia/T2DM with c/f HHS iso prednisone use.    Patient states that at home she eats a varied diet, including grits, eggs, chicken, bananas, fruit, yogurt, Roe beef nikia.  Does not have an endocrinologist or ophthalmologist, but follows with a podiatrist.        PAST MEDICAL & SURGICAL HISTORY:  Atrial fibrillation  S/P Ablation      Pulmonary hypertension      MIGUEL (obstructive sleep apnea)      COPD (chronic obstructive pulmonary disease)  on home O2        HTN (hypertension)      Gout        Hypertension      COPD (chronic obstructive pulmonary disease)      History of mitral valve replacement with mechanical valve      Tricuspid valve replaced  mechanical      FAMILY HISTORY:  Family history of hypertension (Father, Sibling)    Family history of stroke    Family history of hypertension (Mother)        Social History:  Patient used to work in the OR, 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      Outpatient Medications:  · 	predniSONE 10 mg oral tablet: 2 tab(s) orally once a day for one more day on 1/15  	1 tab (10mg) once a day for 3 more days starting 1/16,17.18 then discontinue  · 	predniSONE 10 mg oral tablet: 2 tab(s) orally once a day for one more day on 1/15  	1 tab (10mg) once a day for 3 more days starting 1/16  · 	benzonatate 100 mg oral capsule: 1 cap(s) orally every 8 hours as needed for cough  · 	budesonide 0.5 mg/2 mL inhalation suspension: 1 puff(s) inhaled 2 times a day   · 	bumetanide 1 mg oral tablet: 1 tab(s) orally 2 times a day  · 	aluminum hydroxide-magnesium hydroxide 200 mg-200 mg/5 mL oral suspension: 30 milliliter(s) orally every 4 hours, As needed, Dyspepsia  · 	acetaminophen 325 mg oral tablet: 2 tab(s) orally every 6 hours, As needed, Moderate Pain (4 - 6)  · 	hydrALAZINE 25 mg oral tablet: 1 tab(s) orally 3 times a day  · 	Protonix 40 mg oral delayed release tablet: 1 tab(s) orally once a day   · 	budesonide-formoterol 160 mcg-4.5 mcg/inh inhalation aerosol: 2 puff(s) inhaled 2 times a day   · 	simvastatin 10 mg oral tablet: 1 tab(s) orally once a day (at bedtime)  · 	sodium chloride nasal spray: 1 spray(s) nasal every 6 hours  · 	ferrous sulfate 324 mg (65 mg elemental iron) oral tablet: 1 tab(s) orally once a day  · 	ProAir HFA 90 mcg/inh inhalation aerosol: 2 puff(s) inhaled every 6 hours  MEDICATIONS  (STANDING):  budesonide 160 MICROgram(s)/formoterol 4.5 MICROgram(s) Inhaler 2 Puff(s) Inhalation two times a day  dextrose 5%. 1000 milliLiter(s) (100 mL/Hr) IV Continuous <Continuous>  dextrose 5%. 1000 milliLiter(s) (50 mL/Hr) IV Continuous <Continuous>  dextrose 50% Injectable 25 Gram(s) IV Push once  dextrose 50% Injectable 12.5 Gram(s) IV Push once  dextrose 50% Injectable 25 Gram(s) IV Push once  ferrous    sulfate 325 milliGRAM(s) Oral daily  glucagon  Injectable 1 milliGRAM(s) IntraMuscular once  heparin   Injectable 5000 Unit(s) SubCutaneous every 8 hours  hydrALAZINE 25 milliGRAM(s) Oral three times a day  insulin glargine Injectable (LANTUS) 10 Unit(s) SubCutaneous at bedtime  insulin lispro (ADMELOG) corrective regimen sliding scale   SubCutaneous at bedtime  insulin lispro (ADMELOG) corrective regimen sliding scale   SubCutaneous three times a day before meals  lactated ringers. 1000 milliLiter(s) (75 mL/Hr) IV Continuous <Continuous>  metoprolol succinate ER 50 milliGRAM(s) Oral daily  pantoprazole    Tablet 40 milliGRAM(s) Oral before breakfast  simvastatin 10 milliGRAM(s) Oral at bedtime    MEDICATIONS  (PRN):  ALBUTerol    90 MICROgram(s) HFA Inhaler 2 Puff(s) Inhalation every 6 hours PRN Shortness of Breath and/or Wheezing  dextrose Oral Gel 15 Gram(s) Oral once PRN Blood Glucose LESS THAN 70 milliGRAM(s)/deciliter      Allergies    No Known Allergies    Intolerances      Review of Systems:  Constitutional: (+) weakness, No fever, good appetite/po intake  Eyes: (+) blurry vision, no diplopia  Neuro: No tremors  HEENT: (+) dizziness, No pain  Cardiovascular: No chest pain, palpitations  Respiratory: (+) SOB, no cough  GI: No nausea, vomiting, changes in bowel movements  : (+) polyuria, No dysuria, hematuria  Skin: no rash  Psych: no depression, anxiety  Endocrine: (+) polyuria, polydipsia  Hem/lymph: no swelling  Osteoporosis: no fractures    ALL OTHER SYSTEMS REVIEWED AND NEGATIVE    UNABLE TO OBTAIN    PHYSICAL EXAM:  VITALS: T(C): 36.8 (08-24-22 @ 11:15)  T(F): 98.2 (08-24-22 @ 11:15), Max: 98.6 (08-24-22 @ 01:15)  HR: 94 (08-24-22 @ 11:15) (69 - 101)  BP: 132/82 (08-24-22 @ 11:15) (111/58 - 157/91)  RR:  (18 - 25)  SpO2:  (98% - 100%)  Wt(kg): --  GENERAL: NAD, obese elderly woman sitting in bed  EYES: No proptosis, no lid lag, anicteric, PERRL  HEENT:  Atraumatic, Normocephalic, moist mucous membranes  THYROID: Normal size, no palpable nodules  RESPIRATORY: diffuse wheezing, distant breath sounds  CARDIOVASCULAR: Regular rate and rhythm; distant heart sounds, No murmurs;   GI: Soft, nontender, non distended, normal bowel sounds  SKIN: Dry, intact, No rashes or lesions, skin tenting  NEURO: sensation intact, extraocular movements intact, no tremor, normal reflexes  PSYCH: reactive affect, euthymic mood  CUSHING'S SIGNS: no striae    POCT Blood Glucose.: 557 mg/dL (08-24-22 @ 11:47)  POCT Blood Glucose.: 498 mg/dL (08-24-22 @ 11:45)  POCT Blood Glucose.: 497 mg/dL (08-24-22 @ 11:08)  POCT Blood Glucose.: 308 mg/dL (08-24-22 @ 09:09)  POCT Blood Glucose.: 360 mg/dL (08-24-22 @ 04:28)  POCT Blood Glucose.: 455 mg/dL (08-24-22 @ 02:18)  POCT Blood Glucose.: 555 mg/dL (08-24-22 @ 00:41)  POCT Blood Glucose.: 592 mg/dL (08-23-22 @ 23:02)  POCT Blood Glucose.: >600 mg/dL (08-23-22 @ 20:41)  POCT Blood Glucose.: >600 mg/dL (08-23-22 @ 20:40)                            12.2   8.32  )-----------( 295      ( 23 Aug 2022 23:18 )             39.6       08-23    131<L>  |  85<L>  |  50<H>  ----------------------------<  622<HH>  5.2   |  33<H>  |  1.76<H>    eGFR: 30<L>    Ca    10.3      08-23    TPro  8.5<H>  /  Alb  4.2  /  TBili  0.3  /  DBili  x   /  AST  27  /  ALT  13  /  AlkPhos  110  08-23      Thyroid Function Tests:              Radiology:                HPI:    71 y/o F w/ COPD on home O2(3L) with severe pHTN, OHS/MIGUEL on home biPAP, HTN, HLD admitted for hyperglycemia >600 with c/f HHS.  Patient previously has been using prednisone 10mg intermittently for past month for SOB and only takes when she is feeling more SOB from baseline, states that she only uses it ~3x/week.  Last prednisone dose 2 days ago.  Began experiencing lightheadedness and fatigue, dizziness, polydipsia, polyuria, vision changes x5 days.      FS glc >600 in ED, given 5u insulin with improvement to 300s, fs glc increased to 400s, given 6u insulin, repeat fs 3 hours 340s.  Patient also given IVF, NPO.  Plasma osmolality 311.  Normal pH on VBG with HCO3 33, BHB 0, no AG.  A1c 10.7 (2021 A1c 5.6).  Lactate 2.0.  Endocrine consulted for management of new hyperglycemia/T2DM with c/f HHS iso prednisone use.    Patient states that at home she eats a varied diet, including grits, eggs, chicken, bananas, fruit, yogurt, Roe beef nikia.  Does not have an endocrinologist or ophthalmologist, but follows with a podiatrist.        PAST MEDICAL & SURGICAL HISTORY:  Atrial fibrillation  S/P Ablation      Pulmonary hypertension      MIGUEL (obstructive sleep apnea)      COPD (chronic obstructive pulmonary disease)  on home O2        HTN (hypertension)      Gout        Hypertension      COPD (chronic obstructive pulmonary disease)      History of mitral valve replacement with mechanical valve      Tricuspid valve replaced  mechanical      FAMILY HISTORY:  Family history of hypertension (Father, Sibling)    Family history of stroke    Family history of hypertension (Mother)        Social History:  Patient used to work in the OR, 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      Outpatient Medications:  · 	predniSONE 10 mg oral tablet: 2 tab(s) orally once a day for one more day on 1/15  	1 tab (10mg) once a day for 3 more days starting 1/16,17.18 then discontinue  · 	predniSONE 10 mg oral tablet: 2 tab(s) orally once a day for one more day on 1/15  	1 tab (10mg) once a day for 3 more days starting 1/16  · 	benzonatate 100 mg oral capsule: 1 cap(s) orally every 8 hours as needed for cough  · 	budesonide 0.5 mg/2 mL inhalation suspension: 1 puff(s) inhaled 2 times a day   · 	bumetanide 1 mg oral tablet: 1 tab(s) orally 2 times a day  · 	aluminum hydroxide-magnesium hydroxide 200 mg-200 mg/5 mL oral suspension: 30 milliliter(s) orally every 4 hours, As needed, Dyspepsia  · 	acetaminophen 325 mg oral tablet: 2 tab(s) orally every 6 hours, As needed, Moderate Pain (4 - 6)  · 	hydrALAZINE 25 mg oral tablet: 1 tab(s) orally 3 times a day  · 	Protonix 40 mg oral delayed release tablet: 1 tab(s) orally once a day   · 	budesonide-formoterol 160 mcg-4.5 mcg/inh inhalation aerosol: 2 puff(s) inhaled 2 times a day   · 	simvastatin 10 mg oral tablet: 1 tab(s) orally once a day (at bedtime)  · 	sodium chloride nasal spray: 1 spray(s) nasal every 6 hours  · 	ferrous sulfate 324 mg (65 mg elemental iron) oral tablet: 1 tab(s) orally once a day  · 	ProAir HFA 90 mcg/inh inhalation aerosol: 2 puff(s) inhaled every 6 hours  MEDICATIONS  (STANDING):  budesonide 160 MICROgram(s)/formoterol 4.5 MICROgram(s) Inhaler 2 Puff(s) Inhalation two times a day  dextrose 5%. 1000 milliLiter(s) (100 mL/Hr) IV Continuous <Continuous>  dextrose 5%. 1000 milliLiter(s) (50 mL/Hr) IV Continuous <Continuous>  dextrose 50% Injectable 25 Gram(s) IV Push once  dextrose 50% Injectable 12.5 Gram(s) IV Push once  dextrose 50% Injectable 25 Gram(s) IV Push once  ferrous    sulfate 325 milliGRAM(s) Oral daily  glucagon  Injectable 1 milliGRAM(s) IntraMuscular once  heparin   Injectable 5000 Unit(s) SubCutaneous every 8 hours  hydrALAZINE 25 milliGRAM(s) Oral three times a day  insulin glargine Injectable (LANTUS) 10 Unit(s) SubCutaneous at bedtime  insulin lispro (ADMELOG) corrective regimen sliding scale   SubCutaneous at bedtime  insulin lispro (ADMELOG) corrective regimen sliding scale   SubCutaneous three times a day before meals  lactated ringers. 1000 milliLiter(s) (75 mL/Hr) IV Continuous <Continuous>  metoprolol succinate ER 50 milliGRAM(s) Oral daily  pantoprazole    Tablet 40 milliGRAM(s) Oral before breakfast  simvastatin 10 milliGRAM(s) Oral at bedtime    MEDICATIONS  (PRN):  ALBUTerol    90 MICROgram(s) HFA Inhaler 2 Puff(s) Inhalation every 6 hours PRN Shortness of Breath and/or Wheezing  dextrose Oral Gel 15 Gram(s) Oral once PRN Blood Glucose LESS THAN 70 milliGRAM(s)/deciliter      Allergies    No Known Allergies    Intolerances      Review of Systems:  Constitutional: (+) weakness, No fever, good appetite/po intake  Eyes: (+) blurry vision, no diplopia  Neuro: No tremors  HEENT: (+) dizziness, No pain  Cardiovascular: No chest pain, palpitations  Respiratory: (+) SOB, no cough  GI: No nausea, vomiting, changes in bowel movements  : (+) polyuria, No dysuria, hematuria  Skin: no rash  Psych: no depression, anxiety  Endocrine: (+) polyuria, polydipsia  Hem/lymph: no swelling  Osteoporosis: no fractures    ALL OTHER SYSTEMS REVIEWED AND NEGATIVE    UNABLE TO OBTAIN    PHYSICAL EXAM:  VITALS: T(C): 36.8 (08-24-22 @ 11:15)  T(F): 98.2 (08-24-22 @ 11:15), Max: 98.6 (08-24-22 @ 01:15)  HR: 94 (08-24-22 @ 11:15) (69 - 101)  BP: 132/82 (08-24-22 @ 11:15) (111/58 - 157/91)  RR:  (18 - 25)  SpO2:  (98% - 100%)  Wt(kg): --  GENERAL: NAD, obese elderly woman sitting in bed  EYES: No proptosis, no lid lag, anicteric, PERRL  RESPIRATORY: diffuse wheezing, distant breath sounds  CARDIOVASCULAR: Regular rate and rhythm; distant heart sounds, No murmurs;   GI: Soft, nontender, non distended, normal bowel sounds  SKIN: Dry, intact, No rashes or lesions, skin tenting  NEURO: sensation intact, extraocular movements intact, no tremor, normal reflexes  PSYCH: reactive affect, euthymic mood  CUSHING'S SIGNS: no striae    POCT Blood Glucose.: 557 mg/dL (08-24-22 @ 11:47)  POCT Blood Glucose.: 498 mg/dL (08-24-22 @ 11:45)  POCT Blood Glucose.: 497 mg/dL (08-24-22 @ 11:08)  POCT Blood Glucose.: 308 mg/dL (08-24-22 @ 09:09)  POCT Blood Glucose.: 360 mg/dL (08-24-22 @ 04:28)  POCT Blood Glucose.: 455 mg/dL (08-24-22 @ 02:18)  POCT Blood Glucose.: 555 mg/dL (08-24-22 @ 00:41)  POCT Blood Glucose.: 592 mg/dL (08-23-22 @ 23:02)  POCT Blood Glucose.: >600 mg/dL (08-23-22 @ 20:41)  POCT Blood Glucose.: >600 mg/dL (08-23-22 @ 20:40)                            12.2   8.32  )-----------( 295      ( 23 Aug 2022 23:18 )             39.6       08-23    131<L>  |  85<L>  |  50<H>  ----------------------------<  622<HH>  5.2   |  33<H>  |  1.76<H>    eGFR: 30<L>    Ca    10.3      08-23    TPro  8.5<H>  /  Alb  4.2  /  TBili  0.3  /  DBili  x   /  AST  27  /  ALT  13  /  AlkPhos  110  08-23      Thyroid Function Tests:              Radiology:

## 2022-08-24 NOTE — H&P ADULT - PROBLEM SELECTOR PLAN 7
- c/w simvastatin - on Home O2 3L continuously  - last echo in 2021,consistent with severe pHTN   - continue with O2, maintain O2 between 88-92%  - c/w symbicort,  alubterol PRN

## 2022-08-24 NOTE — PATIENT PROFILE ADULT - FALL HARM RISK - HARM RISK INTERVENTIONS
Assistance with ambulation/Assistance OOB with selected safe patient handling equipment/Communicate Risk of Fall with Harm to all staff/Monitor gait and stability/Reinforce activity limits and safety measures with patient and family/Sit up slowly, dangle for a short time, stand at bedside before walking/Tailored Fall Risk Interventions/Visual Cue: Yellow wristband and red socks/Bed in lowest position, wheels locked, appropriate side rails in place/Call bell, personal items and telephone in reach/Instruct patient to call for assistance before getting out of bed or chair/Non-slip footwear when patient is out of bed/[place] to call system/Physically safe environment - no spills, clutter or unnecessary equipment/Purposeful Proactive Rounding/Room/bathroom lighting operational, light cord in reach

## 2022-08-24 NOTE — H&P ADULT - ASSESSMENT
73 y/o F with pmhx of COPD on home O2(3L) with severe pHTN, OHS/MIGUEL on home biPAP, HTN, HLD who presents with 5 days of lightheadedness and fatigue. Found to be hyperglycemic to >600 with normal AG and no ketones, concern for HHS in setting of new onset diabetes.

## 2022-08-24 NOTE — H&P ADULT - NSHPREVIEWOFSYSTEMS_GEN_ALL_CORE
CONSTITUTIONAL: No fever, no chills, +fatigue, +lightheadness   EYES: No eye pain, no acute blindness +dulled vision loss   Mouth: no pain in mouth, no cuts  RESPIRATORY: No cough, No sob  CARDIOVASCULAR: No CP, no palpitations  GASTROINTESTINAL: no abdominal pain, no n/v/d, + polydipsia   GENITOURINARY: No dysuria, no hematuria, +urinary frequency/polyuria   Heme: No easy bruising, no swelling of neck  NEUROLOGICAL: No seizure, No acute paralysis  SKIN: No itching, no rashes  MUSCULOSKELETAL: No acute joint pain, no joint swelling

## 2022-08-24 NOTE — H&P ADULT - NSHPLABSRESULTS_GEN_ALL_CORE
12.2   8.32  )-----------( 295      ( 23 Aug 2022 23:18 )             39.6           131<L>  |  85<L>  |  50<H>  ----------------------------<  622<HH>  5.2   |  33<H>  |  1.76<H>    Ca    10.3      23 Aug 2022 23:18    TPro  8.5<H>  /  Alb  4.2  /  TBili  0.3  /  DBili  x   /  AST  27  /  ALT  13  /  AlkPhos  110                Urinalysis Basic - ( 24 Aug 2022 00:28 )    Color: Light Yellow / Appearance: Clear / S.021 / pH: x  Gluc: x / Ketone: Negative  / Bili: Negative / Urobili: <2 mg/dL   Blood: x / Protein: Negative / Nitrite: Negative   Leuk Esterase: Negative / RBC: 1 /HPF / WBC 1 /HPF   Sq Epi: x / Non Sq Epi: 2 /HPF / Bacteria: Negative            Lactate Trend            CAPILLARY BLOOD GLUCOSE      POCT Blood Glucose.: 308 mg/dL (24 Aug 2022 09:09)        EKG: Irregular, RBBB ( unchanged from previous EKG)      < from: Xray Chest 1 View- PORTABLE-Urgent (Xray Chest 1 View- PORTABLE-Urgent .) (22 @ 23:31) >        < end of copied text >

## 2022-08-24 NOTE — CONSULT NOTE ADULT - ASSESSMENT
72 year old Female with history of COPD, CHF presents with weakness. Nephrology consulted for elevated Scr.    1) CKD-3b: Baseline Scr 1.4-1.6. Renal function near baseline with CKD likely due to DM. Outpatient CKD work up. Avoid nephrotoxins. Monitor electrolytes.    2) HTN with CKD: BP controlled. Continue with current medications but check orthostatics given complaints of dizziness. Gentle IVF ordered. Monitor BP.    3) LE edema: Patient appears dry. Hold bumex 1 mg PO twice daily given plans for IVF as above and will resume diuretics on 8/25. Prior TTE with grossly normal LVSF. Monitor UO.    4) Metabolic alkalosis: Patient with mixed disorder (respiratory acidosis and metabolic alkalosis). No need for diamox. Monitor pH.    5) L renal mass: Check non-urgent renal US.      [facility] NEPHROLOGY  Rodrigue Amador M.D.  Rob Perez D.O.  Ana Song M.D.  Lucrecia López, ALLISON, ANP-C    Telephone: [phone]  Facsimile: [phone]    [id number] [id number]

## 2022-08-24 NOTE — H&P ADULT - HISTORY OF PRESENT ILLNESS
71 y/o F with pmhx of COPD on home O2(3L) with severe pHTN, COPD, HTN, HLD who presents lightheadness.  71 y/o F with pmhx of COPD on home O2(3L) with severe pHTN, OHS/MIGUEL on home biPAP, HTN, HLD who presents with 5 days of lightheadedness and fatigue. Patient states that she has been feeling off for the last few days .Endorse increased fatigue stating she feels "very slow".  She state that at times she feels like the room is spinning.  She denies any fevers, chills, headaches chest pain or increased cough. She has SOB at baseline and uses 3L O2 continuously she denies any increased SOB. She endorse vision changes, stating that "everything seems darker even when there is light outside". She denies any blurry vision or loss of vision. She also endorse polydipsia nad polyuria. Denies polyphagia but does endorse decreased appetite. She states that she sleeps with 3 pillows at night which is normal for her. Also endorse nocturnal dyspnea that has improved since she has been using her bipap at night.   Patient states that she has been using prednisone intermittently for the last month for SOB. She states she was prescribed 10mg BID and states she only takes the medication when she is feeling more dyspnea that her baseline. She states her last dose was 2 days ago. She states that her symptoms progressively worsened to the pint that she felt helpless and decided to call EMS. In EMS her sugars were >500.     In the ED T. BS were measures >600 x2. She was given 5 units of insulin with improvement to 300s. BHB negative. Admitted to medicine for further management.

## 2022-08-24 NOTE — H&P ADULT - PROBLEM SELECTOR PLAN 5
- on Home O2 3L continuously  - last echo in 2021,consistent with severe pHTN   - continue with O2, maintain O2 between 88-92% - uses BiPAP at night, unsure of home setting   - c/w nocturnal biPAP

## 2022-08-25 LAB
ALBUMIN SERPL ELPH-MCNC: 3.6 G/DL — SIGNIFICANT CHANGE UP (ref 3.3–5)
ALP SERPL-CCNC: 95 U/L — SIGNIFICANT CHANGE UP (ref 40–120)
ALT FLD-CCNC: 13 U/L — SIGNIFICANT CHANGE UP (ref 4–33)
ANION GAP SERPL CALC-SCNC: 7 MMOL/L — SIGNIFICANT CHANGE UP (ref 7–14)
AST SERPL-CCNC: 16 U/L — SIGNIFICANT CHANGE UP (ref 4–32)
BASOPHILS # BLD AUTO: 0.02 K/UL — SIGNIFICANT CHANGE UP (ref 0–0.2)
BASOPHILS NFR BLD AUTO: 0.3 % — SIGNIFICANT CHANGE UP (ref 0–2)
BILIRUB SERPL-MCNC: 0.4 MG/DL — SIGNIFICANT CHANGE UP (ref 0.2–1.2)
BUN SERPL-MCNC: 48 MG/DL — HIGH (ref 7–23)
C PEPTIDE SERPL-MCNC: 3.9 NG/ML — SIGNIFICANT CHANGE UP (ref 1.1–4.4)
CALCIUM SERPL-MCNC: 10.1 MG/DL — SIGNIFICANT CHANGE UP (ref 8.4–10.5)
CHLORIDE SERPL-SCNC: 91 MMOL/L — LOW (ref 98–107)
CHOLEST SERPL-MCNC: 199 MG/DL — SIGNIFICANT CHANGE UP
CO2 SERPL-SCNC: 35 MMOL/L — HIGH (ref 22–31)
CREAT SERPL-MCNC: 1.54 MG/DL — HIGH (ref 0.5–1.3)
CULTURE RESULTS: SIGNIFICANT CHANGE UP
EGFR: 35 ML/MIN/1.73M2 — LOW
EOSINOPHIL # BLD AUTO: 0.12 K/UL — SIGNIFICANT CHANGE UP (ref 0–0.5)
EOSINOPHIL NFR BLD AUTO: 1.7 % — SIGNIFICANT CHANGE UP (ref 0–6)
GLUCOSE BLDC GLUCOMTR-MCNC: 150 MG/DL — HIGH (ref 70–99)
GLUCOSE BLDC GLUCOMTR-MCNC: 259 MG/DL — HIGH (ref 70–99)
GLUCOSE BLDC GLUCOMTR-MCNC: 285 MG/DL — HIGH (ref 70–99)
GLUCOSE BLDC GLUCOMTR-MCNC: 346 MG/DL — HIGH (ref 70–99)
GLUCOSE BLDC GLUCOMTR-MCNC: 362 MG/DL — HIGH (ref 70–99)
GLUCOSE SERPL-MCNC: 318 MG/DL — HIGH (ref 70–99)
HCT VFR BLD CALC: 39.2 % — SIGNIFICANT CHANGE UP (ref 34.5–45)
HDLC SERPL-MCNC: 63 MG/DL — SIGNIFICANT CHANGE UP
HGB BLD-MCNC: 11.8 G/DL — SIGNIFICANT CHANGE UP (ref 11.5–15.5)
IANC: 5.29 K/UL — SIGNIFICANT CHANGE UP (ref 1.8–7.4)
IMM GRANULOCYTES NFR BLD AUTO: 0.3 % — SIGNIFICANT CHANGE UP (ref 0–1.5)
LIPID PNL WITH DIRECT LDL SERPL: 110 MG/DL — HIGH
LYMPHOCYTES # BLD AUTO: 1.16 K/UL — SIGNIFICANT CHANGE UP (ref 1–3.3)
LYMPHOCYTES # BLD AUTO: 16.2 % — SIGNIFICANT CHANGE UP (ref 13–44)
MAGNESIUM SERPL-MCNC: 2.2 MG/DL — SIGNIFICANT CHANGE UP (ref 1.6–2.6)
MCHC RBC-ENTMCNC: 28.9 PG — SIGNIFICANT CHANGE UP (ref 27–34)
MCHC RBC-ENTMCNC: 30.1 GM/DL — LOW (ref 32–36)
MCV RBC AUTO: 96.1 FL — SIGNIFICANT CHANGE UP (ref 80–100)
MONOCYTES # BLD AUTO: 0.53 K/UL — SIGNIFICANT CHANGE UP (ref 0–0.9)
MONOCYTES NFR BLD AUTO: 7.4 % — SIGNIFICANT CHANGE UP (ref 2–14)
NEUTROPHILS # BLD AUTO: 5.29 K/UL — SIGNIFICANT CHANGE UP (ref 1.8–7.4)
NEUTROPHILS NFR BLD AUTO: 74.1 % — SIGNIFICANT CHANGE UP (ref 43–77)
NON HDL CHOLESTEROL: 136 MG/DL — HIGH
NRBC # BLD: 0 /100 WBCS — SIGNIFICANT CHANGE UP (ref 0–0)
NRBC # FLD: 0 K/UL — SIGNIFICANT CHANGE UP (ref 0–0)
PHOSPHATE SERPL-MCNC: 3.8 MG/DL — SIGNIFICANT CHANGE UP (ref 2.5–4.5)
PLATELET # BLD AUTO: 264 K/UL — SIGNIFICANT CHANGE UP (ref 150–400)
POTASSIUM SERPL-MCNC: 4.3 MMOL/L — SIGNIFICANT CHANGE UP (ref 3.5–5.3)
POTASSIUM SERPL-SCNC: 4.3 MMOL/L — SIGNIFICANT CHANGE UP (ref 3.5–5.3)
PROT SERPL-MCNC: 7.7 G/DL — SIGNIFICANT CHANGE UP (ref 6–8.3)
RBC # BLD: 4.08 M/UL — SIGNIFICANT CHANGE UP (ref 3.8–5.2)
RBC # FLD: 13.2 % — SIGNIFICANT CHANGE UP (ref 10.3–14.5)
SODIUM SERPL-SCNC: 133 MMOL/L — LOW (ref 135–145)
SPECIMEN SOURCE: SIGNIFICANT CHANGE UP
TRIGL SERPL-MCNC: 131 MG/DL — SIGNIFICANT CHANGE UP
WBC # BLD: 7.14 K/UL — SIGNIFICANT CHANGE UP (ref 3.8–10.5)
WBC # FLD AUTO: 7.14 K/UL — SIGNIFICANT CHANGE UP (ref 3.8–10.5)

## 2022-08-25 PROCEDURE — 99232 SBSQ HOSP IP/OBS MODERATE 35: CPT | Mod: GC

## 2022-08-25 RX ORDER — INSULIN LISPRO 100/ML
7 VIAL (ML) SUBCUTANEOUS
Refills: 0 | Status: DISCONTINUED | OUTPATIENT
Start: 2022-08-25 | End: 2022-08-26

## 2022-08-25 RX ORDER — INSULIN LISPRO 100/ML
VIAL (ML) SUBCUTANEOUS AT BEDTIME
Refills: 0 | Status: DISCONTINUED | OUTPATIENT
Start: 2022-08-25 | End: 2022-08-31

## 2022-08-25 RX ORDER — INSULIN LISPRO 100/ML
VIAL (ML) SUBCUTANEOUS
Refills: 0 | Status: DISCONTINUED | OUTPATIENT
Start: 2022-08-25 | End: 2022-08-31

## 2022-08-25 RX ORDER — ATORVASTATIN CALCIUM 80 MG/1
10 TABLET, FILM COATED ORAL AT BEDTIME
Refills: 0 | Status: DISCONTINUED | OUTPATIENT
Start: 2022-08-25 | End: 2022-09-05

## 2022-08-25 RX ORDER — HYDRALAZINE HCL 50 MG
10 TABLET ORAL THREE TIMES A DAY
Refills: 0 | Status: DISCONTINUED | OUTPATIENT
Start: 2022-08-25 | End: 2022-09-07

## 2022-08-25 RX ORDER — SODIUM CHLORIDE 9 MG/ML
1000 INJECTION, SOLUTION INTRAVENOUS
Refills: 0 | Status: DISCONTINUED | OUTPATIENT
Start: 2022-08-25 | End: 2022-08-25

## 2022-08-25 RX ADMIN — PANTOPRAZOLE SODIUM 40 MILLIGRAM(S): 20 TABLET, DELAYED RELEASE ORAL at 05:20

## 2022-08-25 RX ADMIN — BUMETANIDE 1 MILLIGRAM(S): 0.25 INJECTION INTRAMUSCULAR; INTRAVENOUS at 05:21

## 2022-08-25 RX ADMIN — Medication 7 UNIT(S): at 14:22

## 2022-08-25 RX ADMIN — Medication 5: at 07:59

## 2022-08-25 RX ADMIN — Medication 6: at 17:15

## 2022-08-25 RX ADMIN — BUDESONIDE AND FORMOTEROL FUMARATE DIHYDRATE 2 PUFF(S): 160; 4.5 AEROSOL RESPIRATORY (INHALATION) at 10:08

## 2022-08-25 RX ADMIN — Medication 50 MILLIGRAM(S): at 05:20

## 2022-08-25 RX ADMIN — INSULIN GLARGINE 21 UNIT(S): 100 INJECTION, SOLUTION SUBCUTANEOUS at 21:31

## 2022-08-25 RX ADMIN — BUMETANIDE 1 MILLIGRAM(S): 0.25 INJECTION INTRAMUSCULAR; INTRAVENOUS at 13:09

## 2022-08-25 RX ADMIN — HEPARIN SODIUM 5000 UNIT(S): 5000 INJECTION INTRAVENOUS; SUBCUTANEOUS at 13:09

## 2022-08-25 RX ADMIN — Medication 7 UNIT(S): at 17:15

## 2022-08-25 RX ADMIN — BUDESONIDE AND FORMOTEROL FUMARATE DIHYDRATE 2 PUFF(S): 160; 4.5 AEROSOL RESPIRATORY (INHALATION) at 21:08

## 2022-08-25 RX ADMIN — HEPARIN SODIUM 5000 UNIT(S): 5000 INJECTION INTRAVENOUS; SUBCUTANEOUS at 21:31

## 2022-08-25 RX ADMIN — Medication 10 MILLIGRAM(S): at 21:28

## 2022-08-25 RX ADMIN — Medication 325 MILLIGRAM(S): at 11:44

## 2022-08-25 RX ADMIN — ALBUTEROL 2 PUFF(S): 90 AEROSOL, METERED ORAL at 18:28

## 2022-08-25 RX ADMIN — SODIUM CHLORIDE 75 MILLILITER(S): 9 INJECTION, SOLUTION INTRAVENOUS at 11:12

## 2022-08-25 RX ADMIN — HEPARIN SODIUM 5000 UNIT(S): 5000 INJECTION INTRAVENOUS; SUBCUTANEOUS at 05:20

## 2022-08-25 RX ADMIN — ATORVASTATIN CALCIUM 10 MILLIGRAM(S): 80 TABLET, FILM COATED ORAL at 21:28

## 2022-08-25 RX ADMIN — Medication 25 MILLIGRAM(S): at 05:21

## 2022-08-25 RX ADMIN — Medication 4: at 11:38

## 2022-08-25 NOTE — PROGRESS NOTE ADULT - SUBJECTIVE AND OBJECTIVE BOX
SUBJECTIVE / OVERNIGHT EVENTS:pt seen and examined    MEDICATIONS  (STANDING):  atorvastatin 10 milliGRAM(s) Oral at bedtime  budesonide 160 MICROgram(s)/formoterol 4.5 MICROgram(s) Inhaler 2 Puff(s) Inhalation two times a day  buMETAnide 1 milliGRAM(s) Oral two times a day  dextrose 5%. 1000 milliLiter(s) (100 mL/Hr) IV Continuous <Continuous>  dextrose 5%. 1000 milliLiter(s) (50 mL/Hr) IV Continuous <Continuous>  dextrose 50% Injectable 25 Gram(s) IV Push once  dextrose 50% Injectable 12.5 Gram(s) IV Push once  dextrose 50% Injectable 25 Gram(s) IV Push once  ferrous    sulfate 325 milliGRAM(s) Oral daily  glucagon  Injectable 1 milliGRAM(s) IntraMuscular once  heparin   Injectable 5000 Unit(s) SubCutaneous every 8 hours  hydrALAZINE 10 milliGRAM(s) Oral three times a day  insulin glargine Injectable (LANTUS) 21 Unit(s) SubCutaneous at bedtime  insulin lispro (ADMELOG) corrective regimen sliding scale   SubCutaneous three times a day before meals  insulin lispro (ADMELOG) corrective regimen sliding scale   SubCutaneous at bedtime  insulin lispro Injectable (ADMELOG) 7 Unit(s) SubCutaneous before breakfast  insulin lispro Injectable (ADMELOG) 7 Unit(s) SubCutaneous before lunch  insulin lispro Injectable (ADMELOG) 7 Unit(s) SubCutaneous before dinner  lactated ringers. 1000 milliLiter(s) (75 mL/Hr) IV Continuous <Continuous>  metoprolol succinate ER 50 milliGRAM(s) Oral daily  pantoprazole    Tablet 40 milliGRAM(s) Oral before breakfast    MEDICATIONS  (PRN):  ALBUTerol    90 MICROgram(s) HFA Inhaler 2 Puff(s) Inhalation every 6 hours PRN Shortness of Breath and/or Wheezing  dextrose Oral Gel 15 Gram(s) Oral once PRN Blood Glucose LESS THAN 70 milliGRAM(s)/deciliter    T(C): 36.6 (22 @ 21:52), Max: 36.7 (22 @ 05:00)  HR: 71 (22 @ 23:34) (61 - 86)  BP: 114/66 (22 @ 21:52) (97/47 - 142/57)  RR: 18 (22 @ 21:52) (17 - 19)  SpO2: 98% (22 @ 23:34) (97% - 100%)    CAPILLARY BLOOD GLUCOSE      POCT Blood Glucose.: 150 mg/dL (25 Aug 2022 21:08)  POCT Blood Glucose.: 259 mg/dL (25 Aug 2022 16:39)  POCT Blood Glucose.: 285 mg/dL (25 Aug 2022 14:21)  POCT Blood Glucose.: 346 mg/dL (25 Aug 2022 11:33)  POCT Blood Glucose.: 362 mg/dL (25 Aug 2022 07:26)    I&O's Summary      Constitutional: No fever, fatigue  Skin: No rash.  Eyes: No recent vision problems or eye pain.  ENT: No congestion, ear pain, or sore throat.  Cardiovascular: No chest pain or palpation.  Respiratory: No cough, shortness of breath, congestion, or wheezing.  Gastrointestinal: No abdominal pain, nausea, vomiting, or diarrhea.  Genitourinary: No dysuria.  Musculoskeletal: No joint swelling.  Neurologic: No headache.    PHYSICAL EXAM:  GENERAL: NAD  EYES: EOMI, PERRLA  NECK: Supple, No JVD  CHEST/LUNG: dec breath sounds at bases  HEART:  S1 , S2 +  ABDOMEN: soft, bs+  EXTREMITIES:  edema+  NEUROLOGY:alert awake oriented      LABS:                        11.8   7.14  )-----------( 264      ( 25 Aug 2022 06:35 )             39.2     08-25    133<L>  |  91<L>  |  48<H>  ----------------------------<  318<H>  4.3   |  35<H>  |  1.54<H>    Ca    10.1      25 Aug 2022 06:35  Phos  3.8     08-25  Mg     2.20     08-25    TPro  7.7  /  Alb  3.6  /  TBili  0.4  /  DBili  x   /  AST  16  /  ALT  13  /  AlkPhos  95  08-25          Urinalysis Basic - ( 24 Aug 2022 00:28 )    Color: Light Yellow / Appearance: Clear / S.021 / pH: x  Gluc: x / Ketone: Negative  / Bili: Negative / Urobili: <2 mg/dL   Blood: x / Protein: Negative / Nitrite: Negative   Leuk Esterase: Negative / RBC: 1 /HPF / WBC 1 /HPF   Sq Epi: x / Non Sq Epi: 2 /HPF / Bacteria: Negative        RADIOLOGY & ADDITIONAL TESTS:    Imaging Personally Reviewed:    Consultant(s) Notes Reviewed:      Care Discussed with Consultants/Other Providers:

## 2022-08-25 NOTE — PROGRESS NOTE ADULT - ASSESSMENT
71 y/o F with pmhx of COPD on home O2(3L) with severe pHTN, OHS/MIGUEL on home biPAP, HTN, HLD who presents with 5 days of lightheadedness and fatigue. Found to be hyperglycemic to >600 with normal AG and no ketones, concern for HHS in setting of new onset diabetes.

## 2022-08-25 NOTE — PROGRESS NOTE ADULT - SUBJECTIVE AND OBJECTIVE BOX
Patient is a 72y old  Female who presents with a chief complaint of lightheadedness and fatigue for 5 days (25 Aug 2022 09:07)        INTERVAL HPI/OVERNIGHT EVENTS:  Given 15u lantus before dinner.  Not started on premeal Admelog when diet was restarted, only low dose ISS.  fs glc in 300s bedtime and fasting.    MEDICATIONS  (STANDING):  budesonide 160 MICROgram(s)/formoterol 4.5 MICROgram(s) Inhaler 2 Puff(s) Inhalation two times a day  buMETAnide 1 milliGRAM(s) Oral two times a day  dextrose 5%. 1000 milliLiter(s) (100 mL/Hr) IV Continuous <Continuous>  dextrose 5%. 1000 milliLiter(s) (50 mL/Hr) IV Continuous <Continuous>  dextrose 50% Injectable 25 Gram(s) IV Push once  dextrose 50% Injectable 12.5 Gram(s) IV Push once  dextrose 50% Injectable 25 Gram(s) IV Push once  ferrous    sulfate 325 milliGRAM(s) Oral daily  glucagon  Injectable 1 milliGRAM(s) IntraMuscular once  heparin   Injectable 5000 Unit(s) SubCutaneous every 8 hours  hydrALAZINE 10 milliGRAM(s) Oral three times a day  insulin glargine Injectable (LANTUS) 21 Unit(s) SubCutaneous at bedtime  insulin lispro (ADMELOG) corrective regimen sliding scale   SubCutaneous at bedtime  insulin lispro (ADMELOG) corrective regimen sliding scale   SubCutaneous three times a day before meals  insulin lispro Injectable (ADMELOG) 7 Unit(s) SubCutaneous before breakfast  insulin lispro Injectable (ADMELOG) 7 Unit(s) SubCutaneous before lunch  insulin lispro Injectable (ADMELOG) 7 Unit(s) SubCutaneous before dinner  lactated ringers. 1000 milliLiter(s) (75 mL/Hr) IV Continuous <Continuous>  metoprolol succinate ER 50 milliGRAM(s) Oral daily  pantoprazole    Tablet 40 milliGRAM(s) Oral before breakfast  simvastatin 10 milliGRAM(s) Oral at bedtime    MEDICATIONS  (PRN):  ALBUTerol    90 MICROgram(s) HFA Inhaler 2 Puff(s) Inhalation every 6 hours PRN Shortness of Breath and/or Wheezing  dextrose Oral Gel 15 Gram(s) Oral once PRN Blood Glucose LESS THAN 70 milliGRAM(s)/deciliter      Allergies    No Known Allergies    Intolerances          Vital Signs Last 24 Hrs  T(C): 36.7 (25 Aug 2022 05:00), Max: 36.8 (24 Aug 2022 11:00)  T(F): 98.1 (25 Aug 2022 05:00), Max: 98.3 (24 Aug 2022 11:00)  HR: 75 (25 Aug 2022 05:00) (75 - 98)  BP: 100/60 (25 Aug 2022 05:00) (100/60 - 136/69)  BP(mean): --  RR: 17 (25 Aug 2022 05:00) (17 - 20)  SpO2: 98% (25 Aug 2022 05:00) (98% - 100%)    Parameters below as of 25 Aug 2022 05:00  Patient On (Oxygen Delivery Method): nasal cannula  O2 Flow (L/min): 3      General: WN/WD NAD  Respiratory: CTA B/L  CV: RRR, S1S2, no murmurs, rubs or gallops  Abdominal: Soft, NT, ND +BS, Last BM  Extremities: No edema, + peripheral pulses    LABS:                        11.8   7.14  )-----------( 264      ( 25 Aug 2022 06:35 )             39.2     08-25    133<L>  |  91<L>  |  48<H>  ----------------------------<  318<H>  4.3   |  35<H>  |  1.54<H>    Ca    10.1      25 Aug 2022 06:35  Phos  3.8     08-25  Mg     2.20     08-25    TPro  7.7  /  Alb  3.6  /  TBili  0.4  /  DBili  x   /  AST  16  /  ALT  13  /  AlkPhos  95  08-25    CAPILLARY BLOOD GLUCOSE      POCT Blood Glucose.: 362 mg/dL (25 Aug 2022 07:26)  POCT Blood Glucose.: 328 mg/dL (24 Aug 2022 21:17)  POCT Blood Glucose.: 219 mg/dL (24 Aug 2022 18:06)  POCT Blood Glucose.: 342 mg/dL (24 Aug 2022 14:28)  POCT Blood Glucose.: 557 mg/dL (24 Aug 2022 11:47)  POCT Blood Glucose.: 498 mg/dL (24 Aug 2022 11:45)  POCT Blood Glucose.: 497 mg/dL (24 Aug 2022 11:08)    Urinalysis Basic - ( 24 Aug 2022 00:28 )    Color: Light Yellow / Appearance: Clear / S.021 / pH: x  Gluc: x / Ketone: Negative  / Bili: Negative / Urobili: <2 mg/dL   Blood: x / Protein: Negative / Nitrite: Negative   Leuk Esterase: Negative / RBC: 1 /HPF / WBC 1 /HPF   Sq Epi: x / Non Sq Epi: 2 /HPF / Bacteria: Negative          RADIOLOGY & ADDITIONAL TESTS:   Patient is a 72y old  Female who presents with a chief complaint of lightheadedness and fatigue for 5 days (25 Aug 2022 09:07)        INTERVAL HPI/OVERNIGHT EVENTS:  Given 15u lantus before dinner.  Not started on premeal Admelog when diet was restarted, only low dose ISS.  fs glc in 300s bedtime and fasting.    Patient reports that she did not eat dinner, only breakfast.  no premeal Admelog given before breakfast, 5u correctional Admelog given.  fasting fs 362.  States that she still feels weak, but the blurriness in her vision is improving.    MEDICATIONS  (STANDING):  budesonide 160 MICROgram(s)/formoterol 4.5 MICROgram(s) Inhaler 2 Puff(s) Inhalation two times a day  buMETAnide 1 milliGRAM(s) Oral two times a day  dextrose 5%. 1000 milliLiter(s) (100 mL/Hr) IV Continuous <Continuous>  dextrose 5%. 1000 milliLiter(s) (50 mL/Hr) IV Continuous <Continuous>  dextrose 50% Injectable 25 Gram(s) IV Push once  dextrose 50% Injectable 12.5 Gram(s) IV Push once  dextrose 50% Injectable 25 Gram(s) IV Push once  ferrous    sulfate 325 milliGRAM(s) Oral daily  glucagon  Injectable 1 milliGRAM(s) IntraMuscular once  heparin   Injectable 5000 Unit(s) SubCutaneous every 8 hours  hydrALAZINE 10 milliGRAM(s) Oral three times a day  insulin glargine Injectable (LANTUS) 21 Unit(s) SubCutaneous at bedtime  insulin lispro (ADMELOG) corrective regimen sliding scale   SubCutaneous at bedtime  insulin lispro (ADMELOG) corrective regimen sliding scale   SubCutaneous three times a day before meals  insulin lispro Injectable (ADMELOG) 7 Unit(s) SubCutaneous before breakfast  insulin lispro Injectable (ADMELOG) 7 Unit(s) SubCutaneous before lunch  insulin lispro Injectable (ADMELOG) 7 Unit(s) SubCutaneous before dinner  lactated ringers. 1000 milliLiter(s) (75 mL/Hr) IV Continuous <Continuous>  metoprolol succinate ER 50 milliGRAM(s) Oral daily  pantoprazole    Tablet 40 milliGRAM(s) Oral before breakfast  simvastatin 10 milliGRAM(s) Oral at bedtime    MEDICATIONS  (PRN):  ALBUTerol    90 MICROgram(s) HFA Inhaler 2 Puff(s) Inhalation every 6 hours PRN Shortness of Breath and/or Wheezing  dextrose Oral Gel 15 Gram(s) Oral once PRN Blood Glucose LESS THAN 70 milliGRAM(s)/deciliter      Allergies    No Known Allergies    Intolerances          Vital Signs Last 24 Hrs  T(C): 36.7 (25 Aug 2022 05:00), Max: 36.8 (24 Aug 2022 11:00)  T(F): 98.1 (25 Aug 2022 05:00), Max: 98.3 (24 Aug 2022 11:00)  HR: 75 (25 Aug 2022 05:00) (75 - 98)  BP: 100/60 (25 Aug 2022 05:00) (100/60 - 136/69)  BP(mean): --  RR: 17 (25 Aug 2022 05:00) (17 - 20)  SpO2: 98% (25 Aug 2022 05:00) (98% - 100%)    Parameters below as of 25 Aug 2022 05:00  Patient On (Oxygen Delivery Method): nasal cannula  O2 Flow (L/min): 3      GENERAL: NAD, obese elderly woman sitting in bed  EYES: No proptosis, no lid lag, anicteric, PERRL  RESPIRATORY: diffuse wheezing, distant breath sounds  CARDIOVASCULAR: Regular rate and rhythm; distant heart sounds, No murmurs;   GI: Soft, nontender, non distended, normal bowel sounds  SKIN: Dry, intact, No rashes or lesions, skin tenting  NEURO: sensation intact, extraocular movements intact, no tremor, normal reflexes  Extremities: no calluses, no ulcers, 2+ DP and radial pulses, some toenail overgrowth  PSYCH: reactive affect, euthymic mood  CUSHING'S SIGNS: no striae    LABS:                        11.8   7.14  )-----------( 264      ( 25 Aug 2022 06:35 )             39.2     08-25    133<L>  |  91<L>  |  48<H>  ----------------------------<  318<H>  4.3   |  35<H>  |  1.54<H>    Ca    10.1      25 Aug 2022 06:35  Phos  3.8     08-25  Mg     2.20     08-25    TPro  7.7  /  Alb  3.6  /  TBili  0.4  /  DBili  x   /  AST  16  /  ALT  13  /  AlkPhos  95  08-25    CAPILLARY BLOOD GLUCOSE      POCT Blood Glucose.: 362 mg/dL (25 Aug 2022 07:26)  POCT Blood Glucose.: 328 mg/dL (24 Aug 2022 21:17)  POCT Blood Glucose.: 219 mg/dL (24 Aug 2022 18:06)  POCT Blood Glucose.: 342 mg/dL (24 Aug 2022 14:28)  POCT Blood Glucose.: 557 mg/dL (24 Aug 2022 11:47)  POCT Blood Glucose.: 498 mg/dL (24 Aug 2022 11:45)  POCT Blood Glucose.: 497 mg/dL (24 Aug 2022 11:08)    Urinalysis Basic - ( 24 Aug 2022 00:28 )    Color: Light Yellow / Appearance: Clear / S.021 / pH: x  Gluc: x / Ketone: Negative  / Bili: Negative / Urobili: <2 mg/dL   Blood: x / Protein: Negative / Nitrite: Negative   Leuk Esterase: Negative / RBC: 1 /HPF / WBC 1 /HPF   Sq Epi: x / Non Sq Epi: 2 /HPF / Bacteria: Negative          RADIOLOGY & ADDITIONAL TESTS:

## 2022-08-25 NOTE — CONSULT NOTE ADULT - SUBJECTIVE AND OBJECTIVE BOX
PATIENT SEEN AND EXAMINED ON :- 8/25/22  DATE OF SERVICE:     8/25/22        Interim events noted,Labs ,Radiological studies and Cardiology tests reviewed .         HOSPITAL COURSE: HPI:  73 y/o F with pmhx of COPD on home O2(3L) with severe pHTN, OHS/MIGUEL on home biPAP, HTN, HLD who presents with 5 days of lightheadedness and fatigue. Patient states that she has been feeling off for the last few days .Endorse increased fatigue stating she feels "very slow".  She state that at times she feels like the room is spinning.  She denies any fevers, chills, headaches chest pain or increased cough. She has SOB at baseline and uses 3L O2 continuously she denies any increased SOB. She endorse vision changes, stating that "everything seems darker even when there is light outside". She denies any blurry vision or loss of vision. She also endorse polydipsia nad polyuria. Denies polyphagia but does endorse decreased appetite. She states that she sleeps with 3 pillows at night which is normal for her. Also endorse nocturnal dyspnea that has improved since she has been using her bipap at night.   Patient states that she has been using prednisone intermittently for the last month for SOB. She states she was prescribed 10mg BID and states she only takes the medication when she is feeling more dyspnea that her baseline. She states her last dose was 2 days ago. She states that her symptoms progressively worsened to the pint that she felt helpless and decided to call EMS. In EMS her sugars were >500.     In the ED T. BS were measures >600 x2. She was given 5 units of insulin with improvement to 300s. BHB negative. Admitted to medicine for further management.     (24 Aug 2022 08:35)      INTERIM EVENTS:Patient seen at bedside ,interim events noted.      PMH -reviewed admission note, no change since admission  HEART FAILURE: Acute[ ]Chronic[ ] Systolic[ ] Diastolic[ ] Combined Systolic and Diastolic[ ]  CAD[ ] CABG[ ] PCI[ ]  DEVICES[ ] PPM[ ] ICD[ ] ILR[ ]  ATRIAL FIBRILLATION[ ] Paroxysmal[ ] Permanent[ ] CHADS2-[  ]  MISAEL[ ] CKD1[ ] CKD2[ ] CKD3[ ] CKD4[ ] ESRD[ ]  COPD[ ] HTN[ ]   DM[ ] Type1[ ] Type 2[ ]   CVA[ ] Paresis[ ]    AMBULATION: Assisted[ ] Cane/walker[ ] Independent[ ]    MEDICATIONS  (STANDING):  atorvastatin 10 milliGRAM(s) Oral at bedtime  budesonide 160 MICROgram(s)/formoterol 4.5 MICROgram(s) Inhaler 2 Puff(s) Inhalation two times a day  buMETAnide 1 milliGRAM(s) Oral two times a day  dextrose 5%. 1000 milliLiter(s) (100 mL/Hr) IV Continuous <Continuous>  dextrose 5%. 1000 milliLiter(s) (50 mL/Hr) IV Continuous <Continuous>  dextrose 50% Injectable 25 Gram(s) IV Push once  dextrose 50% Injectable 12.5 Gram(s) IV Push once  dextrose 50% Injectable 25 Gram(s) IV Push once  ferrous    sulfate 325 milliGRAM(s) Oral daily  glucagon  Injectable 1 milliGRAM(s) IntraMuscular once  heparin   Injectable 5000 Unit(s) SubCutaneous every 8 hours  hydrALAZINE 10 milliGRAM(s) Oral three times a day  insulin glargine Injectable (LANTUS) 21 Unit(s) SubCutaneous at bedtime  insulin lispro (ADMELOG) corrective regimen sliding scale   SubCutaneous three times a day before meals  insulin lispro (ADMELOG) corrective regimen sliding scale   SubCutaneous at bedtime  insulin lispro Injectable (ADMELOG) 7 Unit(s) SubCutaneous before breakfast  insulin lispro Injectable (ADMELOG) 7 Unit(s) SubCutaneous before lunch  insulin lispro Injectable (ADMELOG) 7 Unit(s) SubCutaneous before dinner  lactated ringers. 1000 milliLiter(s) (75 mL/Hr) IV Continuous <Continuous>  metoprolol succinate ER 50 milliGRAM(s) Oral daily  pantoprazole    Tablet 40 milliGRAM(s) Oral before breakfast    MEDICATIONS  (PRN):  ALBUTerol    90 MICROgram(s) HFA Inhaler 2 Puff(s) Inhalation every 6 hours PRN Shortness of Breath and/or Wheezing  dextrose Oral Gel 15 Gram(s) Oral once PRN Blood Glucose LESS THAN 70 milliGRAM(s)/deciliter            REVIEW OF SYSTEMS:  Constitutional: [ ] fever, [ ]weight loss,  [ ]fatigue [ ]weight gain  Eyes: [ ] visual changes  Respiratory: [ ]shortness of breath;  [ ] cough, [ ]wheezing, [ ]chills, [ ]hemoptysis  Cardiovascular: [ ] chest pain, [ ]palpitations, [ ]dizziness,  [ ]leg swelling[ ]orthopnea[ ]PND  Gastrointestinal: [ ] abdominal pain, [ ]nausea, [ ]vomiting,  [ ]diarrhea [ ]Constipation [ ]Melena  Genitourinary: [ ] dysuria, [ ] hematuria [ ]Junior  Neurologic: [ ] headaches [ ] tremors[ ]weakness [ ]Paralysis Right[ ] Left[ ]  Skin: [ ] itching, [ ]burning, [ ] rashes  Endocrine: [ ] heat or cold intolerance  Musculoskeletal: [ ] joint pain or swelling; [ ] muscle, back, or extremity pain  Psychiatric: [ ] depression, [ ]anxiety, [ ]mood swings, or [ ]difficulty sleeping  Hematologic: [ ] easy bruising, [ ] bleeding gums    [ ] All remaining systems negative except as per above.   [ ]Unable to obtain.  [x] No change in ROS since admission      Vital Signs Last 24 Hrs  T(C): 36.7 (25 Aug 2022 18:00), Max: 36.8 (24 Aug 2022 21:45)  T(F): 98.1 (25 Aug 2022 18:00), Max: 98.2 (24 Aug 2022 21:45)  HR: 78 (25 Aug 2022 19:34) (74 - 94)  BP: 142/57 (25 Aug 2022 18:00) (97/47 - 142/57)  BP(mean): --  RR: 19 (25 Aug 2022 18:28) (17 - 19)  SpO2: 97% (25 Aug 2022 19:34) (97% - 100%)    Parameters below as of 25 Aug 2022 18:28  Patient On (Oxygen Delivery Method): nasal cannula  O2 Flow (L/min): 3    I&O's Summary      PHYSICAL EXAM:  General: No acute distress BMI-  HEENT: EOMI, PERRL  Neck: Supple, [ ] JVD  Lungs: Equal air entry bilaterally; [ ] rales [ ] wheezing [ ] rhonchi  Heart: Regular rate and rhythm; [x ] murmur   2/6 [ x] systolic [ ] diastolic [ ] radiation[ ] rubs [ ]  gallops  Abdomen: Nontender, bowel sounds present  Extremities: No clubbing, cyanosis, [ ] edema [ ]Pulses  equal and intact  Nervous system:  Alert & Oriented X3, no focal deficits  Psychiatric: Normal affect  Skin: No rashes or lesions    LABS:  08-25    133<L>  |  91<L>  |  48<H>  ----------------------------<  318<H>  4.3   |  35<H>  |  1.54<H>    Ca    10.1      25 Aug 2022 06:35  Phos  3.8     08-25  Mg     2.20     08-25    TPro  7.7  /  Alb  3.6  /  TBili  0.4  /  DBili  x   /  AST  16  /  ALT  13  /  AlkPhos  95  08-25    Creatinine Trend: 1.54<--, 1.48<--, 1.51<--, 1.76<--                        11.8   7.14  )-----------( 264      ( 25 Aug 2022 06:35 )             39.2         Serum Pro-Brain Natriuretic Peptide: 2016 pg/mL (08-23-22 @ 23:18)      Patient name: DAMARI GUERRERO  YOB: 1950   Age: 72 (F)   MR#: [id number]  Study Date: 8/24/2022  [facility]leana [facility] RD[facility]  Study quality: Technically Difficult  Referring Physician: Renny Newman MD  Blood Pressure: 124/86 mmHg  Height: 162 cm  Weight: 107 kg  BSA: 2.1 m2  ------------------------------------------------------------------------  PROCEDURE: Transthoracic echocardiogram with 2-D, M-Mode  and complete spectral and color flow Doppler.  Intravenous ultrasound enhancing agent was administered for  improved left ventricular endocardial border definition.  Following the intravenous injection of ultrasound enhancing  agent, harmonic imaging was performed.  INDICATION: Heart failure, unspecified (I50.9)  ------------------------------------------------------------------------  DIMENSIONS:  Dimensions:     Normal Values:  LA:     5.0 cm    2.0 - 4.0 cm  Ao:     3.8 cm    2.0 - 3.8 cm  SEPTUM:   ---     0.6 - 1.2 cm  PWT:      --- 0.6 - 1.1 cm  LVIDd:    ---     3.0 - 5.6 cm  LVIDs:    ---     1.8 - 4.0 cm  ------------------------------------------------------------------------  OBSERVATIONS:  Mitral Valve: Mechanical prosthetic mitral valve  replacement. Minimal mitral regurgitation. Mean transmitral  valve gradient equals 6 mm Hg, which is elevated even in  the setting of a prosthetic valve.  Aortic Root: Normal aortic root.  Aortic Valve: Calcified trileaflet aortic valve with normal  opening. Peak transaortic valve gradient equals 17 mm Hg,  mean transaortic valve gradient equals 9 mm Hg.  Left Atrium: Normal left atrium.  Left Ventricle: Endocardium not well visualized; grossly  normal left ventricular systolic function. Endocardial  visualization enhanced with intravenous injection of echo  contrast (Definity). Septal consistent with right  ventricular overload. Normal left ventricular internal  dimensions and wall thicknesses.  Right Heart: Mild right atrial enlargement. A device wire  is noted in the right heart. Right ventricular enlargement  with decreased right ventricular systolic function. An  annuloplasty ring is seen in the tricuspid position. Normal  pulmonic valve.  Pericardium/PleuraNormal pericardium with no pericardial  effusion.  ------------------------------------------------------------------------  CONCLUSIONS:  1. Mechanical prosthetic mitral valve replacement. Minimal  mitral regurgitation. Mean transmitral valve gradient  equals 6 mm Hg, which is elevated even in the setting ofa  prosthetic valve.  2. Endocardium not well visualized; grossly normal left  ventricular systolic function. Endocardial visualization  enhanced with intravenous injection of echo contrast  (Definity). Septal consistent with right ventricular  overload.  3. Right ventricular enlargement with decreased right  ventricular systolic function.  ------------------------------------------------------------------------  Confirmed on  8/24/2022 - 23:49:32 by Mitzi Cage M.D. RPVI  ------------------------------------------------------------------------

## 2022-08-25 NOTE — PROGRESS NOTE ADULT - ASSESSMENT
72 year old Female with history of COPD, CHF presents with weakness. Nephrology consulted for elevated Scr.    1) CKD-3b: Baseline Scr 1.4-1.6. Renal function @ baseline with CKD likely due to DM. Outpatient CKD work up. Avoid nephrotoxins. Monitor electrolytes.    2) HTN with CKD: BP low. Decrease hydralazine to 10 mg PO TID. Check orthostatics. Monitor BP.    3) LE edema: Resume bumex 1 mg PO twice daily. Prior TTE with grossly normal LVSF. Monitor UO.    4) Metabolic alkalosis: Patient with mixed disorder (respiratory acidosis and metabolic alkalosis). No need for diamox. Monitor pH.    5) L renal mass: Recommend Urology consultation. If futher imaging needed, would check MRI rather than CT with contrast.      [facility] NEPHROLOGY  Rodrigue Amador M.D.  Rob Perez D.O.  Ana Song M.D.  Lucrecia López, MSN, ANP-C    Telephone: [phone]  Facsimile: [phone]    [id number] [id number]

## 2022-08-25 NOTE — PROGRESS NOTE ADULT - SUBJECTIVE AND OBJECTIVE BOX
[facility] NEPHROLOGY- PROGRESS NOTE    72 year old Female with history of COPD, CHF presents with weakness. Nephrology consulted for elevated Scr.    REVIEW OF SYSTEMS:  Gen: no changes in weight  Cards: no chest pain  Resp: + dyspnea (chronic)  GI: no nausea or vomiting or diarrhea  Vascular: + LE edema    No Known Allergies      Hospital Medications: Medications reviewed    VITALS:  T(F): 98.1 (22 @ 05:00), Max: 98.3 (22 @ 09:16)  HR: 75 (22 @ 05:00)  BP: 100/60 (22 @ 05:00)  RR: 17 (22 @ 05:00)  SpO2: 98% (22 @ 05:00)  Wt(kg): --  Height (cm): 162.6 ( @ 20:35)  Weight (kg): 107 ( @ 11:15)  BMI (kg/m2): 40.5 ( @ 11:15)  BSA (m2): 2.1 ( @ 11:15)      PHYSICAL EXAM:    Gen: NAD, calm  Cards: RRR, +S1/S2, no M/G/R  Resp: CTA B/L  GI: soft, NT/ND, NABS  Vascular: trace LE edema B/L    LABS:      133<L>  |  91<L>  |  48<H>  ----------------------------<  318<H>  4.3   |  35<H>  |  1.54<H>    Ca    10.1      25 Aug 2022 06:35  Phos  3.8       Mg     2.20         TPro  7.7  /  Alb  3.6  /  TBili  0.4  /  DBili      /  AST  16  /  ALT  13  /  AlkPhos  95      Creatinine Trend: 1.54 <--, 1.48 <--, 1.51 <--, 1.76 <--                        11.8   7.14  )-----------( 264      ( 25 Aug 2022 06:35 )             39.2     Urine Studies:  Urinalysis Basic - ( 24 Aug 2022 00:28 )    Color: Light Yellow / Appearance: Clear / S.021 / pH:   Gluc:  / Ketone: Negative  / Bili: Negative / Urobili: <2 mg/dL   Blood:  / Protein: Negative / Nitrite: Negative   Leuk Esterase: Negative / RBC: 1 /HPF / WBC 1 /HPF   Sq Epi:  / Non Sq Epi: 2 /HPF / Bacteria: Negative          RADIOLOGY & ADDITIONAL STUDIES:    < from: US Kidney and Bladder (22 @ 13:57) >  IMPRESSION:  Redemonstration of a left upper pole mass, previously noted to be   concerning for solid neoplasm, stable to slightly increased in size.   Recommend further evaluation with renal protocol MRI or CT for further   evaluation.        --- End of Report ---    < end of copied text >

## 2022-08-25 NOTE — PROGRESS NOTE ADULT - ASSESSMENT
A/P: 71 y/o F w/ COPD on home O2(3L) with severe pHTN, OHS/MIGUEL on home biPAP, HTN, HLD admitted for hyperglycemia >600 with c/f HHS.  Endocrine consulted for management of hyperglycemia/new T2DM.  Patient does not meet full criteria for HHS (serum glc >600, plasma osmolality >320, AMS), and is responding adequately to insulin.  Please see following recs:    1. Hyperglycemia/DM2  - Standing weight: 107kg   - continue IVF  - NPO, q6h fs, can start on consistent carb diet once glc < 250 with pre-meal   - lantus 21u at bedtime and Admelog 7u premeal  - low dose Admelog ISS pre-meal, low-dose Admelog ISS at bedtime    - Glucose Target 100 -180 mg/dl; Above goal  - hypoglycemia protocol in place   - Nutrition consult  - Provider to Rn: Insulin pen administration and glucometer teaching prior to discharge. Please send glucometer to [facility] and teach pt with her own glucometer prior to discharge.    DC planning  - ____ lantus at bedtime and ____ Admelog premeal  -Discuss with patient the importance of Carb consistent diet and exercise as tolerated.    -Recommend nutritional consultation  inpt prior to dc   -Discuss glycemic goal of a1c <7% to prevent microvascular complications of diabetes mellitus and reduce the risk of macrovascular complications.  - Make sure patient knows how to inject insulin and check fingersticks with glucometer (ask bedside RN for teaching)  - pt on kinetics and action of basal and prandial insulin.    Pt should call their doctor when FSBG <70 or above >400 and or consistently above 200s as changes in the regimen will have to be made.  -pt should call PMD for DM related questions or concerns until pt is seen by CDE or endocrine   -Recommend annual podiatry and ophthalmology follow up.       If patient wishes to follow up with [facility]ocrin[facility]     [facility]  [address] [place] [address], [address], [place] [address]  [phone]   Please call to schedule appointment with CDE/Nutritionist and MD appointment next available   Ensure patient has working glucometer, test strips, lancets, alcohol pads, and BD sharan pen needles  Please also prescribe glucose tabs, Baqsimi nasal spray or glucagon emergency kit for hypoglycemia risk         2. Steroid use  - Patient intermittently using prednisone 10mg for SOB, last dose 2 days ago.  - BPs stable, steroid withdrawal symptoms of less concern  - Continue to hold prednisone, current presentation of new hyperglycemia is likely 2/2 steroid use  she is not on long  term steroids therefore risk of secondary AI is decreased       3. HTN  - on bumex 1mg bid, hydralazine 25mg tid, goal <130/80 and >100/60  - outpt mc/cr    4. HLD  - on simvastatin 10mg qd  - outpatient lipid panel    -Hannah Boogie, PGY-1       A/P: 71 y/o F w/ COPD on home O2(3L) with severe pHTN, OHS/MIGUEL on home biPAP, HTN, HLD admitted for hyperglycemia >600 with c/f HHS.  Endocrine consulted for management of hyperglycemia/new T2DM.  Patient does not meet full criteria for HHS (serum glc >600, plasma osmolality >320, AMS), and is responding adequately to insulin.  Needs premeal Admelog.  Please see following recs:    1. Hyperglycemia/DM2  - Standing weight: 107kg   - continue IVF  - NPO, q6h fs, can start on consistent carb diet once glc < 250 with pre-meal   - lantus 21u at bedtime and Admelog 7u premeal  - low dose Admelog ISS pre-meal, low-dose Admelog ISS at bedtime    - Glucose Target 100 -180 mg/dl; Above goal  - hypoglycemia protocol in place   - Nutrition consult  - Provider to Rn: Insulin pen administration and glucometer teaching prior to discharge. Please send glucometer to [facility] and teach pt with her own glucometer prior to discharge.    DC planning  - ____ lantus at bedtime and ____ Admelog premeal  -Discuss with patient the importance of Carb consistent diet and exercise as tolerated.    -Recommend nutritional consultation  inpt prior to dc   -Discuss glycemic goal of a1c <7% to prevent microvascular complications of diabetes mellitus and reduce the risk of macrovascular complications.  - Make sure patient knows how to inject insulin and check fingersticks with glucometer (ask bedside RN for teaching)  - pt on kinetics and action of basal and prandial insulin.    Pt should call their doctor when FSBG <70 or above >400 and or consistently above 200s as changes in the regimen will have to be made.  -pt should call PMD for DM related questions or concerns until pt is seen by CDE or endocrine   -Recommend annual podiatry and ophthalmology follow up.       If patient wishes to follow up with [facility]ocr[facility]     [facility]  [address] [place] [address], [address], [place], [place] [id number] [phone]   Please call to schedule appointment with CDE/Nutritionist and MD appointment next available   Ensure patient has working glucometer, test strips, lancets, alcohol pads, and BD sharan pen needles  Please also prescribe glucose tabs, Baqsimi nasal spray or glucagon emergency kit for hypoglycemia risk         2. Steroid use  - Patient intermittently using prednisone 10mg for SOB, last dose 2 days ago.  - BPs stable, steroid withdrawal symptoms of less concern  - Continue to hold prednisone, current presentation of new hyperglycemia is likely 2/2 steroid use  she is not on long  term steroids therefore risk of secondary AI is decreased       3. HTN  - on bumex 1mg bid, hydralazine 25mg tid, goal <130/80 and >100/60  - outpt mc/cr    4. HLD  - on simvastatin 10mg qd  - outpatient lipid panel    -Hannah Boogie, PGY-1       A/P: 73 y/o F w/ COPD on home O2(3L) with severe pHTN, OHS/MIGUEL on home biPAP, HTN, HLD admitted for hyperglycemia >600 with c/f HHS.  Endocrine consulted for management of hyperglycemia/new T2DM.  Patient does not meet full criteria for HHS (serum glc >600, plasma osmolality >320, AMS), and is responding adequately to insulin.  Needs premeal Admelog.  Please see following recs:    1. Hyperglycemia/DM2  - Standing weight: 107kg   - continue IVF  - Consistent carbohydrate diet unless c/f DKA (anion gap, acidosis, etc.)  - lantus 21u at bedtime and Admelog 7u premeal  - moderate dose Admelog ISS pre-meal, moderate dose Admelog ISS at bedtime    - Glucose Target 100 -180 mg/dl; Above goal  - hypoglycemia protocol in place   - Nutrition consult  - Provider to Rn: Insulin pen administration and glucometer teaching prior to discharge. Please send glucometer to [facility] and teach pt with her own glucometer prior to discharge.    DC planning  - ____ lantus at bedtime and ____ Admelog premeal  -Discuss with patient the importance of Carb consistent diet and exercise as tolerated.    -Recommend nutritional consultation  inpt prior to dc   -Discuss glycemic goal of a1c <7% to prevent microvascular complications of diabetes mellitus and reduce the risk of macrovascular complications.  - Make sure patient knows how to inject insulin and check fingersticks with glucometer (ask bedside RN for teaching)  - pt on kinetics and action of basal and prandial insulin.    Pt should call their doctor when FSBG <70 or above >400 and or consistently above 200s as changes in the regimen will have to be made.  -pt should call PMD for DM related questions or concerns until pt is seen by CDE or endocrine   -Recommend annual podiatry and ophthalmology follow up.       If patient wishes to follow up with [facility] Endocrinology     [facility]  [address] [place] [address], [address], [place], [place] [id number] [phone]   Please call to schedule appointment with CDE/Nutritionist and MD appointment next available   Ensure patient has working glucometer, test strips, lancets, alcohol pads, and BD sharan pen needles  Please also prescribe glucose tabs, Baqsimi nasal spray or glucagon emergency kit for hypoglycemia risk         2. Steroid use  - Patient intermittently using prednisone 10mg for SOB, last dose 2 days ago.  - BPs stable, steroid withdrawal symptoms of less concern  - Continue to hold prednisone, current presentation of new hyperglycemia is likely 2/2 steroid use  she is not on long  term steroids therefore risk of secondary AI is decreased       3. HTN  - on bumex 1mg bid, hydralazine 25mg tid, goal <130/80 and >100/60  - outpt mc/cr    4. HLD  - on simvastatin 10mg qd  - outpatient lipid panel    -Hannah Boogie, PGY-1       A/P: 73 y/o F w/ COPD on home O2(3L) with severe pHTN, OHS/MIGUEL on home biPAP, HTN, HLD admitted for hyperglycemia >600 with c/f HHS.  Endocrine consulted for management of hyperglycemia/new T2DM.  Patient does not meet full criteria for HHS (serum glc >600, plasma osmolality >320, AMS), and is responding adequately to insulin.  Needs premeal Admelog.  Please see following recs:    1. Hyperglycemia/DM2  - Standing weight: 107kg   - continue IVF  - Consistent carbohydrate diet unless c/f DKA (anion gap, acidosis, etc.)  - lantus 21u at bedtime and Admelog 7u premeal  - moderate dose Admelog ISS pre-meal, moderate dose Admelog ISS at bedtime    - Glucose Target 100 -180 mg/dl; Above goal  - hypoglycemia protocol in place   - Nutrition consult  - Provider to Rn: Insulin pen administration and glucometer teaching prior to discharge. Please send glucometer to [facility] and teach pt with her own glucometer prior to discharge.    DC planning  - ____ lantus at bedtime and ____ Admelog premeal  -Discuss with patient the importance of Carb consistent diet and exercise as tolerated.    -Recommend nutritional consultation  inpt prior to dc   -Discuss glycemic goal of a1c <7% to prevent microvascular complications of diabetes mellitus and reduce the risk of macrovascular complications.  - Make sure patient knows how to inject insulin and check fingersticks with glucometer (ask bedside RN for teaching)  - pt on kinetics and action of basal and prandial insulin.    Pt should call their doctor when FSBG <70 or above >400 and or consistently above 200s as changes in the regimen will have to be made.  -pt should call PMD for DM related questions or concerns until pt is seen by CDE or endocrine   -Recommend annual podiatry and ophthalmology follow up.       If patient wishes to follow up with [facility] Endocrinology     [facility]  [address] [place] [address], [address], [place], [place] [id number] [phone]   Please call to schedule appointment with CDE/Nutritionist and MD appointment next available   Ensure patient has working glucometer, test strips, lancets, alcohol pads, and BD sharan pen needles  Please also prescribe glucose tabs, Baqsimi nasal spray or glucagon emergency kit for hypoglycemia risk         2. Steroid use  - Patient intermittently using prednisone 10mg for SOB, last dose 2 days ago.  - BPs stable, steroid withdrawal symptoms of less concern  - Continue to hold prednisone  she is not on long  term steroids therefore risk of secondary AI is decreased       3. HTN  - on bumex 1mg bid, hydralazine 25mg tid, goal <130/80 and >100/60  - outpt mc/cr    4. HLD  - switch to atorvastatin 10mg qd  - outpatient lipid panel    -Hannah Boogie, PGY-1       A/P: 71 y/o F w/ COPD on home O2(3L) with severe pHTN, OHS/MIGUEL on home biPAP, HTN, HLD admitted for hyperglycemia >600 with c/f HHS.  Endocrine consulted for management of hyperglycemia/new T2DM.  Patient does not meet full criteria for HHS (serum glc >600, plasma osmolality >320, AMS), and is responding adequately to insulin.  Needs premeal Admelog.  Please see following recs:    1. Hyperglycemia/DM2  - Standing weight: 107kg   - continue IVF  - Consistent carbohydrate diet unless c/f DKA (anion gap, acidosis, etc.)  - lantus 21u at bedtime and Admelog 7u premeal  - moderate dose Admelog ISS pre-meal, moderate dose Admelog ISS at bedtime    - Glucose Target 100 -180 mg/dl; Above goal  - hypoglycemia protocol in place   - Nutrition consult  - Provider to Rn: Insulin pen administration and glucometer teaching prior to discharge. Please send glucometer to [facility] and teach pt with her own glucometer prior to discharge.    DC planning  - ____ lantus at bedtime and ____ Admelog premeal  -Discuss with patient the importance of Carb consistent diet and exercise as tolerated.    -Recommend nutritional consultation  inpt prior to dc   -Discuss glycemic goal of a1c <7% to prevent microvascular complications of diabetes mellitus and reduce the risk of macrovascular complications.  - Make sure patient knows how to inject insulin and check fingersticks with glucometer (ask bedside RN for teaching)  - pt on kinetics and action of basal and prandial insulin.    Pt should call their doctor when FSBG <70 or above >400 and or consistently above 200s as changes in the regimen will have to be made.  -pt should call PMD for DM related questions or concerns until pt is seen by CDE or endocrine   -Recommend annual podiatry and ophthalmology follow up.       If patient wishes to follow up with [facility] Endocrinology     [facility]  [address] [place] [address], [address], [place], [place] [id number] [phone]   Please call to schedule appointment with CDE/Nutritionist and MD appointment next available   Ensure patient has working glucometer, test strips, lancets, alcohol pads, and BD sharan pen needles  Please also prescribe glucose tabs, Baqsimi nasal spray or glucagon emergency kit for hypoglycemia risk         2. Steroid use  - Patient intermittently using prednisone 10mg for SOB, last dose 2 days ago.  - BPs stable, steroid withdrawal symptoms of less concern  - Continue to hold prednisone  she is not on long  term steroids therefore risk of secondary AI is decreased       3. HTN  - on bumex 1mg bid, hydralazine 25mg tid, goal <130/80 and >100/60  - outpt mc/cr    4. HLD  - switch to atorvastatin 10mg qd  - outpatient lipid panel    -Hannah Boogie, PGY-1    NOTE INCOMPLETE UNLESS SIGNED BY ATTENDING       A/P: 71 y/o F w/ COPD on home O2(3L) with severe pHTN, OHS/MIGUEL on home biPAP, HTN, HLD admitted for hyperglycemia >600 with c/f HHS.  Endocrine consulted for management of hyperglycemia/new T2DM.  Patient does not meet full criteria for HHS (serum glc >600, plasma osmolality >320, AMS), and is responding adequately to insulin.  Needs premeal Admelog.  Please see following recs:    1. Hyperglycemia/DM2  - Standing weight: 107kg   - continue IVF  - Consistent carbohydrate diet unless c/f DKA (anion gap, acidosis, etc.)  - lantus 21u at bedtime and Admelog 7u premeal TID  - moderate dose Admelog ISS pre-meal, moderate dose Admelog ISS at bedtime    - Glucose Target 100 -180 mg/dl; Above goal  - hypoglycemia protocol in place   - Nutrition consult  - Provider to Rn: Insulin pen administration and glucometer teaching prior to discharge. Please send glucometer to [facility] and teach pt with her own glucometer prior to discharge.    DC planning  - ____ lantus at bedtime and ____ Admelog premeal  -Discuss with patient the importance of Carb consistent diet and exercise as tolerated.    -Recommend nutritional consultation  inpt prior to dc   -Discuss glycemic goal of a1c <7% to prevent microvascular complications of diabetes mellitus and reduce the risk of macrovascular complications.  - Make sure patient knows how to inject insulin and check fingersticks with glucometer (ask bedside RN for teaching)  - pt on kinetics and action of basal and prandial insulin.    Pt should call their doctor when FSBG <70 or above >400 and or consistently above 200s as changes in the regimen will have to be made.  -pt should call PMD for DM related questions or concerns until pt is seen by CDE or endocrine   -Recommend annual podiatry and ophthalmology follow up.       If patient wishes to follow up with [facility] Endocrinology     [facility]  [address] [place] [address], [address], [place], [place] [id number] [phone]   Please call to schedule appointment with CDE/Nutritionist and MD appointment next available   Ensure patient has working glucometer, test strips, lancets, alcohol pads, and BD sharan pen needles  Please also prescribe glucose tabs, Baqsimi nasal spray or glucagon emergency kit for hypoglycemia risk         2. Steroid use  - Patient intermittently using prednisone 10mg for SOB, last dose 2 days ago.  - BPs stable, steroid withdrawal symptoms of less concern  - Continue to hold prednisone  she is not on long  term steroids therefore risk of secondary AI is decreased       3. HTN  - on bumex 1mg bid, hydralazine 25mg tid, goal <130/80 and >100/60  - outpt mc/cr    4. HLD  - switch to atorvastatin 10mg qd  - outpatient lipid panel    -Hannah Boogie, PGY-1    NOTE INCOMPLETE UNLESS SIGNED BY ATTENDING

## 2022-08-25 NOTE — CONSULT NOTE ADULT - ASSESSMENT
· Assessment	  73 y/o F with pmhx of COPD on home O2(3L) with severe pHTN, OHS/MIGUEL on home biPAP, HTN, HLD who presents with 5 days of lightheadedness and fatigue. Found to be hyperglycemic to >600 with normal AG and no ketones, concern for HHS in setting of new onset diabetes.     Problem/Plan - 1:  ·  Problem: Diabetes mellitus, new onset.   ·  Plan: - patient with polyuria, polydipsia, Blood sugar >600, BHB negative, normal AG not consistent with DKA, likely HHS in setting of intermittent use of steroids for COPD (10mg BID)  - patient has been on and off steroids since January for COPD exacerbations, steroid induced diabetes vs latent T2DM onset/  - HgbA1c 10.7, (previous A1c 5.6(2021), 5.8(2020)); was prescribed empagliflozin earlier in year( per [employer]) but no diagnosis of diabetes in past?  - will start on 10 units qhs with ISS, based on requirements can adjust accordingly  - Endo consult   - f/u MARY, Islet cell antibody, c-peptide       - endocrine consult given new onset.    Problem/Plan - 2:  ·  Problem: Hyperglycemia due to diabetes mellitus.   ·  Plan: -.    Problem/Plan - 3:  ·  Problem: Acute kidney injury superimposed on CKD.  ·  Plan: - Cr 1.76, baseline appears (1.3-1.5)  - likely in setting of osmotic diuresis 2/2 to hyperglycemia, causing prerenal azotemia (BUN/Cr ratio >20)  - received 500cc fluids in ED  - will give gentle IVF.    Problem/Plan - 4:  ·  Problem: Chronic diastolic heart failure.  ·  Plan: - last EF 2020 50%, with notable severe pHTN (last in 2021)   - will repeat TTE   - c/w bumex 1mg BID.    Problem/Plan - 5:  ·  Problem: MIGUEL treated with BiPAP.  ·  Plan: - uses BiPAP at night, unsure of home setting   - c/w nocturnal biPAP.    Problem/Plan - 6:  ·  Problem: Hypertension.  ·  Plan: - c/w hydralazine 25 TID.    Problem/Plan - 7:  ·  Problem: COPD, severe.  ·  Plan: - on Home O2 3L continuously  - last echo in 2021,consistent with severe pHTN   - continue with O2, maintain O2 between 88-92%  - c/w symbicort,  alubterol PRN.    Problem/Plan - 8:  ·  Problem: Chronic atrial fibrillation.  ·  Plan: - not on any AC, s/p Ablation  - c/w metoprolol 50 qd.    Problem/Plan - 9:  ·  Problem: HLD (hyperlipidemia).   ·  Plan: - c/w simvastatin.

## 2022-08-25 NOTE — CONSULT NOTE ADULT - TIME BILLING
- Review of records, telemetry, vital signs and daily labs.   - General and cardiovascular physical examination.  - Generation of cardiovascular treatment plan.  - Coordination of care.      Patient was seen and examined by me on 8/25/22,interim events noted,labs and radiology studies reviewed.  Edwar Merida MD,FACC.  [address] [place], [place] [id number].  [phone]

## 2022-08-26 ENCOUNTER — TRANSCRIPTION ENCOUNTER (OUTPATIENT)
Age: 72
End: 2022-08-26

## 2022-08-26 LAB
ANION GAP SERPL CALC-SCNC: 10 MMOL/L — SIGNIFICANT CHANGE UP (ref 7–14)
BUN SERPL-MCNC: 59 MG/DL — HIGH (ref 7–23)
CALCIUM SERPL-MCNC: 10.1 MG/DL — SIGNIFICANT CHANGE UP (ref 8.4–10.5)
CHLORIDE SERPL-SCNC: 91 MMOL/L — LOW (ref 98–107)
CO2 SERPL-SCNC: 34 MMOL/L — HIGH (ref 22–31)
CREAT SERPL-MCNC: 1.69 MG/DL — HIGH (ref 0.5–1.3)
EGFR: 32 ML/MIN/1.73M2 — LOW
GLUCOSE BLDC GLUCOMTR-MCNC: 249 MG/DL — HIGH (ref 70–99)
GLUCOSE BLDC GLUCOMTR-MCNC: 251 MG/DL — HIGH (ref 70–99)
GLUCOSE BLDC GLUCOMTR-MCNC: 259 MG/DL — HIGH (ref 70–99)
GLUCOSE BLDC GLUCOMTR-MCNC: 287 MG/DL — HIGH (ref 70–99)
GLUCOSE SERPL-MCNC: 213 MG/DL — HIGH (ref 70–99)
MAGNESIUM SERPL-MCNC: 2.2 MG/DL — SIGNIFICANT CHANGE UP (ref 1.6–2.6)
PHOSPHATE SERPL-MCNC: 4.1 MG/DL — SIGNIFICANT CHANGE UP (ref 2.5–4.5)
POTASSIUM SERPL-MCNC: 4.8 MMOL/L — SIGNIFICANT CHANGE UP (ref 3.5–5.3)
POTASSIUM SERPL-SCNC: 4.8 MMOL/L — SIGNIFICANT CHANGE UP (ref 3.5–5.3)
SODIUM SERPL-SCNC: 135 MMOL/L — SIGNIFICANT CHANGE UP (ref 135–145)

## 2022-08-26 PROCEDURE — 99232 SBSQ HOSP IP/OBS MODERATE 35: CPT | Mod: GC

## 2022-08-26 PROCEDURE — 93010 ELECTROCARDIOGRAM REPORT: CPT

## 2022-08-26 RX ORDER — ISOPROPYL ALCOHOL, BENZOCAINE .7; .06 ML/ML; ML/ML
1 SWAB TOPICAL
Qty: 100 | Refills: 1
Start: 2022-08-26 | End: 2022-10-14

## 2022-08-26 RX ORDER — INSULIN LISPRO 100/ML
9 VIAL (ML) SUBCUTANEOUS
Refills: 0 | Status: DISCONTINUED | OUTPATIENT
Start: 2022-08-26 | End: 2022-08-27

## 2022-08-26 RX ORDER — INSULIN GLARGINE 100 [IU]/ML
30 INJECTION, SOLUTION SUBCUTANEOUS AT BEDTIME
Refills: 0 | Status: DISCONTINUED | OUTPATIENT
Start: 2022-08-26 | End: 2022-08-27

## 2022-08-26 RX ORDER — ACETAMINOPHEN 500 MG
650 TABLET ORAL EVERY 6 HOURS
Refills: 0 | Status: DISCONTINUED | OUTPATIENT
Start: 2022-08-26 | End: 2022-09-07

## 2022-08-26 RX ADMIN — BUMETANIDE 1 MILLIGRAM(S): 0.25 INJECTION INTRAMUSCULAR; INTRAVENOUS at 06:13

## 2022-08-26 RX ADMIN — Medication 325 MILLIGRAM(S): at 12:06

## 2022-08-26 RX ADMIN — HEPARIN SODIUM 5000 UNIT(S): 5000 INJECTION INTRAVENOUS; SUBCUTANEOUS at 22:06

## 2022-08-26 RX ADMIN — Medication 10 MILLIGRAM(S): at 14:08

## 2022-08-26 RX ADMIN — HEPARIN SODIUM 5000 UNIT(S): 5000 INJECTION INTRAVENOUS; SUBCUTANEOUS at 14:04

## 2022-08-26 RX ADMIN — BUDESONIDE AND FORMOTEROL FUMARATE DIHYDRATE 2 PUFF(S): 160; 4.5 AEROSOL RESPIRATORY (INHALATION) at 21:31

## 2022-08-26 RX ADMIN — Medication 6: at 12:05

## 2022-08-26 RX ADMIN — Medication 7 UNIT(S): at 12:04

## 2022-08-26 RX ADMIN — ALBUTEROL 2 PUFF(S): 90 AEROSOL, METERED ORAL at 21:35

## 2022-08-26 RX ADMIN — BUMETANIDE 1 MILLIGRAM(S): 0.25 INJECTION INTRAMUSCULAR; INTRAVENOUS at 14:04

## 2022-08-26 RX ADMIN — Medication 7 UNIT(S): at 06:47

## 2022-08-26 RX ADMIN — Medication 10 MILLIGRAM(S): at 22:09

## 2022-08-26 RX ADMIN — Medication 650 MILLIGRAM(S): at 10:26

## 2022-08-26 RX ADMIN — HEPARIN SODIUM 5000 UNIT(S): 5000 INJECTION INTRAVENOUS; SUBCUTANEOUS at 06:14

## 2022-08-26 RX ADMIN — Medication 50 MILLIGRAM(S): at 06:13

## 2022-08-26 RX ADMIN — Medication 2: at 22:07

## 2022-08-26 RX ADMIN — Medication 7 UNIT(S): at 16:51

## 2022-08-26 RX ADMIN — INSULIN GLARGINE 30 UNIT(S): 100 INJECTION, SOLUTION SUBCUTANEOUS at 22:06

## 2022-08-26 RX ADMIN — Medication 650 MILLIGRAM(S): at 20:33

## 2022-08-26 RX ADMIN — BUDESONIDE AND FORMOTEROL FUMARATE DIHYDRATE 2 PUFF(S): 160; 4.5 AEROSOL RESPIRATORY (INHALATION) at 09:16

## 2022-08-26 RX ADMIN — Medication 650 MILLIGRAM(S): at 10:56

## 2022-08-26 RX ADMIN — Medication 6: at 16:52

## 2022-08-26 RX ADMIN — PANTOPRAZOLE SODIUM 40 MILLIGRAM(S): 20 TABLET, DELAYED RELEASE ORAL at 06:13

## 2022-08-26 RX ADMIN — ATORVASTATIN CALCIUM 10 MILLIGRAM(S): 80 TABLET, FILM COATED ORAL at 22:07

## 2022-08-26 RX ADMIN — Medication 10 MILLIGRAM(S): at 06:13

## 2022-08-26 RX ADMIN — Medication 650 MILLIGRAM(S): at 21:28

## 2022-08-26 RX ADMIN — Medication 4: at 06:47

## 2022-08-26 NOTE — PROGRESS NOTE ADULT - ASSESSMENT
72 year old Female with history of COPD, CHF presents with weakness. Nephrology consulted for elevated Scr.    1) CKD-3b: Baseline Scr 1.4-1.6. Renal function @ baseline with CKD likely due to DM. Outpatient CKD work up. Avoid nephrotoxins. Monitor electrolytes.    2) HTN with CKD: BP low normal. Continue with current medications. Monitor BP.    3) LE edema: Patient appears relatively euvolemic. Continue with bumex 1 mg PO twice daily. Prior TTE with grossly normal LVSF. Monitor UO.    4) Metabolic alkalosis: Patient with mixed disorder (respiratory acidosis and metabolic alkalosis). No need for diamox. Monitor pH.    5) L renal mass: Patient now agreeable to Urology consultation. If futher imaging needed, would check MRI rather than CT with contrast.      [facility] NEPHROLOGY  Rodrigue Amador M.D.  Rob Perez D.O.  Ana Song M.D.  Lucrecia López, MSN, ANP-C    Telephone: [phone]  Facsimile: [phone]    [address] [place] [address]

## 2022-08-26 NOTE — DISCHARGE NOTE PROVIDER - NSDCCPCAREPLAN_GEN_ALL_CORE_FT
PRINCIPAL DISCHARGE DIAGNOSIS  Diagnosis: Hyperglycemia due to diabetes mellitus  Assessment and Plan of Treatment: Your HgA1C during hospitalization was noted to be 10.7% (Provide such information to your primary care) likely from steroid use and you presented with very high sugars for which endocrine was consulted.  You were started on insulin and sent home on a regimen with supplies that nursing help teach how to use.  Continue your medication regimen and a consistent carbohydrate diet (Meaning eating the same amount of carbohydrates at the same time each day). Monitor blood glucose levels throughout the day before meals and at bedtime. Record blood sugars and bring to outpatient providers appointment in order to be reviewed by your doctor for management modifications. If your sugars are more than 400 or less than 70 you should contact your PCP immediately.   Monitor for signs/symptoms of low blood glucose, such as, dizziness, altered mental status, or cool/clammy skin. In addition, monitor for signs/symptoms of high blood glucose, such as, feeling hot, dry, fatigued, or with increased thirst/urination.   Make regular podiatry appointments in order to have feet checked for wounds and uncontrolled toe nail growth to prevent infections, as well as, appointments with an ophthalmologist to monitor your vision.  You have the following upcoming appointments: [facility], [address] [place] [address], [address], [place] [address], [phone] Nurse Practitioner: Linda 9/9/22 at 1:30 PM and Endocrinologist: Dr. Henderson 11/14/22 12:20 PM.      SECONDARY DISCHARGE DIAGNOSES  Diagnosis: MIGUEL treated with BiPAP  Assessment and Plan of Treatment: AVAPS ___________________    Diagnosis: COPD, severe  Assessment and Plan of Treatment: Please continue to use your inhalers as prescribed and follow-up with your primary care provider/pulmonologist for further care and annual pulmonary function testings.   Avoid smoking or being exposed to second-hand smoke, as well as, other potenital exacerbating triggers (Dust/pollen/pets).   Monitor for signs/symptoms of COPD exacerbation, such as, shortness of breath, increased sputum production, increased cough, wheezing, difficulty breathing, or fever - Report to the emergency room if symptoms are not relieved by usual regimen.    Diagnosis: Chronic atrial fibrillation  Assessment and Plan of Treatment: Please continue your medications as directed and follow-up with your primary provider/cardiologist to further manage your care.   Monitor for signs/symptoms of uncontrolled atrial fibrillation, such as, increased heart rate, palpitations, chest pain, dizziness, or shortness of breath - Return to emergency room if these signs/symptoms are present.    Diagnosis: Acute kidney injury superimposed on CKD  Assessment and Plan of Treatment: In order to prevent further disease progression, continue to follow recommendations made by your primary provider/nephrologist. Continue a diet that is low in sodium and avoid foods that are concentrated in potassium and phosphorus. Continue your medications/supplementations as directed and avoid over-the-counter drugs that are harmful to kidneys, such as, Non-Steroidal Anti-Inflammatory Drugs (NSAIDs). Follow-up as outpatient to monitor your kidney function, as well as, vitamin D, Calcium, potassium, and phosphorus levels.    Diagnosis: Chronic diastolic heart failure  Assessment and Plan of Treatment: Continue recommended medication regimen, fluid restriction (Less than 1.5 Liters per day). Monitor for signs/symptoms of fluid overload and electrolyte abnormalities, such as, shortness of breath, cough, swelling, chest discomfort, changes in heart rate, dizziness, fainting, or changes in mental status. Keep track of your weight and call your outpatient physician if there are abrupt changes in weight.   Follow-up with your outpatient provider after you've been discharged from the hospital for further care/recommendations.    Diagnosis: Renal mass  Assessment and Plan of Treatment: It is recommended to follow-up with urology as outpatient for full work-up for noted kidney mass. Additional imaging is needed.

## 2022-08-26 NOTE — DISCHARGE NOTE PROVIDER - HOSPITAL COURSE
71 y/o F with pmhx of COPD on home O2(3L) with severe pHTN, OHS/MIGUEL on home biPAP, HTN, HLD who presents with 5 days of lightheadedness and fatigue. Found to be hyperglycemic to >600 with normal AG and no ketones, concern for HHS in setting of new onset diabetes.     Diabetes mellitus, new onset.   ·  Plan: - patient with polyuria, polydipsia, Blood sugar >600, BHB negative, normal AG not consistent with DKA, likely HHS in setting of intermittent use of steroids for COPD (10mg BID)  - patient has been on and off steroids since January for COPD exacerbations, steroid induced diabetes vs latent T2DM onset/  - HgbA1c 10.7, (previous A1c 5.6(2021), 5.8(2020)); was prescribed empagliflozin earlier in year( per Surescripts) but no diagnosis of diabetes in past?  - will start on 10 units qhs with ISS, based on requirements can adjust accordingly  - Endo consult   - f/u MARY, Islet cell antibody, c-peptide   - endocrine consult given new onset.    Hyperglycemia due to diabetes mellitus.   ·  Plan: -.- Glucose Target 100 -180 mg/dl; Above goal  -Endocrine consulted appreciate recs  - continue IVF  - Consistent carbohydrate diet unless c/f DKA (anion gap, acidosis, etc.)  - lantus 21u at bedtime and Admelog 7u premeal TID  - moderate dose Admelog ISS pre-meal, moderate dose Admelog ISS at bedtime  - hypoglycemia protocol in place   - Nutrition consult    Acute kidney injury superimposed on CKD.  ·  Plan: - Cr 1.76, baseline appears (1.3-1.5)  - likely in setting of osmotic diuresis 2/2 to hyperglycemia, causing prerenal azotemia (BUN/Cr ratio >20)  - received 500cc fluids in ED  -Nephrology consulted appreciate recs  - will give gentle IVF.    Chronic diastolic heart failure.  ·  Plan: - last EF 2020 50%, with notable severe pHTN (last in 2021)   - TTE  - Mechanical prosthetic mitral valve replacement. Minimal MR. normal left ventricular systolic function. Septal consistent with right ventricular overload. Right ventricular enlargement with decreased right ventricular systolic function   Cardiology consulted appreciate recs  - c/w bumex 1mg BID.    MIGUEL treated with BiPAP.  ·  Plan: - uses BiPAP at night, unsure of home setting   - c/w nocturnal biPAP.     Hypertension.  ·  Plan: - c/w hydralazine 25 TID.    COPD, severe.  ·  Plan: - on Home O2 3L continuously  - last echo in 2021,consistent with severe pHTN   - continue with O2, maintain O2 between 88-92%  - c/w symbicort,  alubterol PRN.     Chronic atrial fibrillation.  ·  Plan: - not on any AC, s/p Ablation  - c/w metoprolol 50 qd.    HLD (hyperlipidemia).   ·  Plan: - c/w simvastatin.     72F h/o COPD on O2 (3L) with severe pHTN, OHS/MIGUEL on home BiPAP, HTN, HLD p/w lightheadedness and fatigue. Found to be hyperglycemic to > 600 with normal AG and no ketones, concern for HHS in setting of new onset diabetes.     Diabetes mellitus, new onset.   - Presenting w/ polyuria, polydipsia, sugars >600, BHB negative, normal AG not consistent with DKA, likely HHS in setting of intermittent use of steroids for COPD (10mg BID) steroids now being tapered w/ PO Prednisone   - HgbA1c 10.7, (previous A1c 5.6 (2021) prescribed Empagliflozin earlier in year   - Endo consulted started on Lantus, Admelog, ISS  - Per endocrine will plan to DC on Levemir and Novolog PENS ( also on insulin and volunteered to assist in teaching/administration)  - Recommend PCP, endocrinology, podiatry and ophthalmology follow-up  - Scheduled with [facility]: [facility], [address] [place] [address], [address], [place] [address], [phone] Nurse Practitioner: Linda 9/9/22 at 1:30 PM and Endocrinologist: Dr. Henderson 11/14/22 12:20 PM    MISAEL on CKD-3b  - Baseline Scr 1.4-1.6. Renal function @ baseline with CKD likely due to DM; Outpatient CKD work up. Avoid nephrotoxins. Monitor electrolytes s/p IVF; Renal consulted; MISAEL resolved. UA bland. FeUrea low.     L renal mass  - Per Nephro if futher imaging needed, would check MRI rather than CT with contrast; Kidney US L upper pole mass, previously noted to be concerning for solid neoplasm, stable to slightly increased in size; Outpt  follow up ** Declined MRI Abdomen, unable to lay flat    Chronic diastolic heart failure  - Last EF 2020 50%, with notable severe pHTN (last in 2021) 8/24 repeat TTE w/ mechanical prosthetic MV replacement. Minimal MR normal LV systolic function. Septal consistent with RV overload. RV enlargement with decreased RV systolic function   - LE edema worsening. Changed from Bumex 2 mg IV twice daily to 2 mg PO daily, but did not seem to be sufficient. Given Bumex 2mg IV X 1 for last 2 days, but then creatinine increased. Hold diuretics 9/5 and restart PO again 9/6    HTN/HLD on Bumex, hydralazine (Decreased from 25 to 10), and Toprol; On statin; BP controlled    COPD, severe w/ MIGUEL on Home O2 3L continue with O2, maintain O2 between 88-92%  - Last echo in 2021,consistent with severe pHTN; C/w Symbicort standing and Alubterol PRN.  - MIGUEL treated with BiPAP at night, unsure of home setting, per pulm use last hospitalization settings; Note patient has been refusing ABG in AM      Chronic atrial fibrillation not on any AC, s/p Ablation c/w Metoprolol 50 QD; Rate controlled    Dispo -

## 2022-08-26 NOTE — DISCHARGE NOTE PROVIDER - NSDCFUSCHEDAPPT_GEN_ALL_CORE_FT
Linda Anna  [facility] [address] [facility]  Scheduled Appointment: 09/09/2022    [place] [id number] [facility]  Scheduled Appointment: 11/14/2022     [facility]  [facility] [address] [facility]  Scheduled Appointment: 11/14/2022

## 2022-08-26 NOTE — CHART NOTE - NSCHARTNOTEFT_GEN_A_CORE
Patient s/p Renal ultrasound with findings: Redemonstration of a left upper pole mass, previously noted to be   concerning for solid neoplasm, stable to slightly increased in size. Recommend further evaluation with renal protocol MRI or CT for further   evaluation. Discussed with Nephrologist  Dr Perez, will follow up MRI. Discussed with Urology, will reconsult URO when MRI results are in. Attending Dr Newman updated.

## 2022-08-26 NOTE — DIETITIAN INITIAL EVALUATION ADULT - PROBLEM SELECTOR PROBLEM 2
HR=60 bpm, KPBR=852/94 mmhg, SpO2=90.0 %, Resp=18 B/min, EtCO2=39 mmHg, Apnea=6 Seconds, Pain=0, [place]=10, [place]=2 Hyperglycemia due to diabetes mellitus

## 2022-08-26 NOTE — DISCHARGE NOTE PROVIDER - NSDCFUADDAPPT_GEN_ALL_CORE_FT
Patient should check FS 3-4x daily, including AM fasting and before meals  If BG< 90 or > 250 mg/dl patient should call PCP  Patient can follow up with endocrinology if she chooses to  Can be seen at [facility].  Phone number is [phone]

## 2022-08-26 NOTE — PROGRESS NOTE ADULT - SUBJECTIVE AND OBJECTIVE BOX
Patient is a 72y old  Female who presents with a chief complaint of lightheadedness and fatigue for 5 days (26 Aug 2022 07:39)        INTERVAL HPI/OVERNIGHT EVENTS:    Restarted on cc diet yesterday.  7u pre-lunch-->259 fs pre-dinner--> 7u + 6u --> 215 fs bedtime--> 21u lantus--> 213 serum am and 249 fs am -->4u + 7u    MEDICATIONS  (STANDING):  atorvastatin 10 milliGRAM(s) Oral at bedtime  budesonide 160 MICROgram(s)/formoterol 4.5 MICROgram(s) Inhaler 2 Puff(s) Inhalation two times a day  buMETAnide 1 milliGRAM(s) Oral two times a day  dextrose 5%. 1000 milliLiter(s) (100 mL/Hr) IV Continuous <Continuous>  dextrose 5%. 1000 milliLiter(s) (50 mL/Hr) IV Continuous <Continuous>  dextrose 50% Injectable 25 Gram(s) IV Push once  dextrose 50% Injectable 12.5 Gram(s) IV Push once  dextrose 50% Injectable 25 Gram(s) IV Push once  ferrous    sulfate 325 milliGRAM(s) Oral daily  glucagon  Injectable 1 milliGRAM(s) IntraMuscular once  heparin   Injectable 5000 Unit(s) SubCutaneous every 8 hours  hydrALAZINE 10 milliGRAM(s) Oral three times a day  insulin glargine Injectable (LANTUS) 21 Unit(s) SubCutaneous at bedtime  insulin lispro (ADMELOG) corrective regimen sliding scale   SubCutaneous three times a day before meals  insulin lispro (ADMELOG) corrective regimen sliding scale   SubCutaneous at bedtime  insulin lispro Injectable (ADMELOG) 7 Unit(s) SubCutaneous before breakfast  insulin lispro Injectable (ADMELOG) 7 Unit(s) SubCutaneous before lunch  insulin lispro Injectable (ADMELOG) 7 Unit(s) SubCutaneous before dinner  metoprolol succinate ER 50 milliGRAM(s) Oral daily  pantoprazole    Tablet 40 milliGRAM(s) Oral before breakfast    MEDICATIONS  (PRN):  acetaminophen     Tablet .. 650 milliGRAM(s) Oral every 6 hours PRN Mild Pain (1 - 3), Moderate Pain (4 - 6), Severe Pain (7 - 10)  ALBUTerol    90 MICROgram(s) HFA Inhaler 2 Puff(s) Inhalation every 6 hours PRN Shortness of Breath and/or Wheezing  dextrose Oral Gel 15 Gram(s) Oral once PRN Blood Glucose LESS THAN 70 milliGRAM(s)/deciliter      Allergies    No Known Allergies    Intolerances          Vital Signs Last 24 Hrs  T(C): 36.7 (26 Aug 2022 06:00), Max: 36.7 (25 Aug 2022 18:00)  T(F): 98 (26 Aug 2022 06:00), Max: 98.1 (25 Aug 2022 18:00)  HR: 74 (26 Aug 2022 07:03) (61 - 86)  BP: 115/70 (26 Aug 2022 06:00) (97/47 - 142/57)  BP(mean): --  RR: 18 (26 Aug 2022 06:00) (17 - 19)  SpO2: 99% (26 Aug 2022 07:03) (97% - 100%)    Parameters below as of 26 Aug 2022 06:00  Patient On (Oxygen Delivery Method): nasal cannula  O2 Flow (L/min): 2      General: WN/WD NAD  Respiratory: CTA B/L  CV: RRR, S1S2, no murmurs, rubs or gallops  Abdominal: Soft, NT, ND +BS, Last BM  Extremities: No edema, + peripheral pulses    LABS:                        11.8   7.14  )-----------( 264      ( 25 Aug 2022 06:35 )             39.2     08-26    135  |  91<L>  |  59<H>  ----------------------------<  213<H>  4.8   |  34<H>  |  1.69<H>    Ca    10.1      26 Aug 2022 05:40  Phos  4.1     08-26  Mg     2.20     08-26    TPro  7.7  /  Alb  3.6  /  TBili  0.4  /  DBili  x   /  AST  16  /  ALT  13  /  AlkPhos  95  08-25    CAPILLARY BLOOD GLUCOSE      POCT Blood Glucose.: 249 mg/dL (26 Aug 2022 06:37)  POCT Blood Glucose.: 150 mg/dL (25 Aug 2022 21:08)  POCT Blood Glucose.: 259 mg/dL (25 Aug 2022 16:39)  POCT Blood Glucose.: 285 mg/dL (25 Aug 2022 14:21)  POCT Blood Glucose.: 346 mg/dL (25 Aug 2022 11:33)          RADIOLOGY & ADDITIONAL TESTS:   Patient is a 72y old  Female who presents with a chief complaint of lightheadedness and fatigue for 5 days (26 Aug 2022 07:39)        INTERVAL HPI/OVERNIGHT EVENTS:    Restarted on cc diet yesterday.  7u pre-lunch-->259 fs pre-dinner--> 7u + 6u --> 215 fs bedtime--> 21u lantus--> 213 serum am and 249 fs am -->4u + 7u    Patient states that she feels terrible this morning, but not more fatigued or with changes in vision compared to yesterday.  Asked for a glass of ice water and for the AC to be turned up.  Respiration at baseline.  Stated that she had not gotten diabetes education yet.    MEDICATIONS  (STANDING):  atorvastatin 10 milliGRAM(s) Oral at bedtime  budesonide 160 MICROgram(s)/formoterol 4.5 MICROgram(s) Inhaler 2 Puff(s) Inhalation two times a day  buMETAnide 1 milliGRAM(s) Oral two times a day  dextrose 5%. 1000 milliLiter(s) (100 mL/Hr) IV Continuous <Continuous>  dextrose 5%. 1000 milliLiter(s) (50 mL/Hr) IV Continuous <Continuous>  dextrose 50% Injectable 25 Gram(s) IV Push once  dextrose 50% Injectable 12.5 Gram(s) IV Push once  dextrose 50% Injectable 25 Gram(s) IV Push once  ferrous    sulfate 325 milliGRAM(s) Oral daily  glucagon  Injectable 1 milliGRAM(s) IntraMuscular once  heparin   Injectable 5000 Unit(s) SubCutaneous every 8 hours  hydrALAZINE 10 milliGRAM(s) Oral three times a day  insulin glargine Injectable (LANTUS) 21 Unit(s) SubCutaneous at bedtime  insulin lispro (ADMELOG) corrective regimen sliding scale   SubCutaneous three times a day before meals  insulin lispro (ADMELOG) corrective regimen sliding scale   SubCutaneous at bedtime  insulin lispro Injectable (ADMELOG) 7 Unit(s) SubCutaneous before breakfast  insulin lispro Injectable (ADMELOG) 7 Unit(s) SubCutaneous before lunch  insulin lispro Injectable (ADMELOG) 7 Unit(s) SubCutaneous before dinner  metoprolol succinate ER 50 milliGRAM(s) Oral daily  pantoprazole    Tablet 40 milliGRAM(s) Oral before breakfast    MEDICATIONS  (PRN):  acetaminophen     Tablet .. 650 milliGRAM(s) Oral every 6 hours PRN Mild Pain (1 - 3), Moderate Pain (4 - 6), Severe Pain (7 - 10)  ALBUTerol    90 MICROgram(s) HFA Inhaler 2 Puff(s) Inhalation every 6 hours PRN Shortness of Breath and/or Wheezing  dextrose Oral Gel 15 Gram(s) Oral once PRN Blood Glucose LESS THAN 70 milliGRAM(s)/deciliter      Allergies    No Known Allergies    Intolerances          Vital Signs Last 24 Hrs  T(C): 36.7 (26 Aug 2022 06:00), Max: 36.7 (25 Aug 2022 18:00)  T(F): 98 (26 Aug 2022 06:00), Max: 98.1 (25 Aug 2022 18:00)  HR: 74 (26 Aug 2022 07:03) (61 - 86)  BP: 115/70 (26 Aug 2022 06:00) (97/47 - 142/57)  BP(mean): --  RR: 18 (26 Aug 2022 06:00) (17 - 19)  SpO2: 99% (26 Aug 2022 07:03) (97% - 100%)    Parameters below as of 26 Aug 2022 06:00  Patient On (Oxygen Delivery Method): nasal cannula  O2 Flow (L/min): 2      General: upset, obese woman sitting in bed  EYES: No proptosis, no lid lag, anicteric, PERRL  RESPIRATORY: diffuse wheezing, distant breath sounds  CARDIOVASCULAR: Regular rate and rhythm; distant heart sounds, No murmurs;   GI: Soft, nontender, non distended, normal bowel sounds  SKIN: Dry, intact, No rashes or lesions, no skin tenting  NEURO: sensation intact, extraocular movements intact, no tremor, normal reflexes  Extremities: no calluses, no ulcers, 2+ DP and radial pulses, some toenail overgrowth  PSYCH: reactive affect, euthymic mood  CUSHING'S SIGNS: no striae    LABS:                        11.8   7.14  )-----------( 264      ( 25 Aug 2022 06:35 )             39.2     08-26    135  |  91<L>  |  59<H>  ----------------------------<  213<H>  4.8   |  34<H>  |  1.69<H>    Ca    10.1      26 Aug 2022 05:40  Phos  4.1     08-26  Mg     2.20     08-26    TPro  7.7  /  Alb  3.6  /  TBili  0.4  /  DBili  x   /  AST  16  /  ALT  13  /  AlkPhos  95  08-25    CAPILLARY BLOOD GLUCOSE      POCT Blood Glucose.: 249 mg/dL (26 Aug 2022 06:37)  POCT Blood Glucose.: 150 mg/dL (25 Aug 2022 21:08)  POCT Blood Glucose.: 259 mg/dL (25 Aug 2022 16:39)  POCT Blood Glucose.: 285 mg/dL (25 Aug 2022 14:21)  POCT Blood Glucose.: 346 mg/dL (25 Aug 2022 11:33)          RADIOLOGY & ADDITIONAL TESTS:

## 2022-08-26 NOTE — CHART NOTE - NSCHARTNOTEFT_GEN_A_CORE
Called by nurse for short run of NSVT seen on tele, patient is asymptomatic, denies any chest pain, or palpitations, denies any dyspnea at rest.  Vital signs stable. EKG Afib with RBBB, electrolytes wnl K > 4.0  & Mg > 2. Discussed with Cardiologist Dr Merida, will continue to monitor tele.  Attending Physician updated. Called by nurse for short run of NSVT seen on tele, patient is asymptomatic, denies any chest pain, or palpitations, or dyspnea.  Vital signs stable. EKG Afib with RBBB, electrolytes wnl K > 4.0  & Mg > 2. Discussed with Cardiologist Dr Merida, will continue to monitor tele.  Attending Physician updated.

## 2022-08-26 NOTE — DISCHARGE NOTE PROVIDER - NSFOLLOWUPCLINICS_GEN_ALL_ED_FT
[facility]  Endocrinology  [address] [place] [address]  Phone: [phone]  Fax:     [facility] Pulmonolgy and Sleep Medicine  Pulmonology  [address] [place] [address]  Phone: [phone]  Fax:

## 2022-08-26 NOTE — PROGRESS NOTE ADULT - SUBJECTIVE AND OBJECTIVE BOX
SUBJECTIVE / OVERNIGHT EVENTS:pt seen and examined    MEDICATIONS  (STANDING):  atorvastatin 10 milliGRAM(s) Oral at bedtime  budesonide 160 MICROgram(s)/formoterol 4.5 MICROgram(s) Inhaler 2 Puff(s) Inhalation two times a day  buMETAnide 1 milliGRAM(s) Oral two times a day  dextrose 5%. 1000 milliLiter(s) (100 mL/Hr) IV Continuous <Continuous>  dextrose 5%. 1000 milliLiter(s) (50 mL/Hr) IV Continuous <Continuous>  dextrose 50% Injectable 25 Gram(s) IV Push once  dextrose 50% Injectable 12.5 Gram(s) IV Push once  dextrose 50% Injectable 25 Gram(s) IV Push once  ferrous    sulfate 325 milliGRAM(s) Oral daily  glucagon  Injectable 1 milliGRAM(s) IntraMuscular once  heparin   Injectable 5000 Unit(s) SubCutaneous every 8 hours  hydrALAZINE 10 milliGRAM(s) Oral three times a day  insulin glargine Injectable (LANTUS) 30 Unit(s) SubCutaneous at bedtime  insulin lispro (ADMELOG) corrective regimen sliding scale   SubCutaneous three times a day before meals  insulin lispro (ADMELOG) corrective regimen sliding scale   SubCutaneous at bedtime  insulin lispro Injectable (ADMELOG) 9 Unit(s) SubCutaneous three times a day before meals  metoprolol succinate ER 50 milliGRAM(s) Oral daily  pantoprazole    Tablet 40 milliGRAM(s) Oral before breakfast    MEDICATIONS  (PRN):  acetaminophen     Tablet .. 650 milliGRAM(s) Oral every 6 hours PRN Mild Pain (1 - 3), Moderate Pain (4 - 6), Severe Pain (7 - 10)  ALBUTerol    90 MICROgram(s) HFA Inhaler 2 Puff(s) Inhalation every 6 hours PRN Shortness of Breath and/or Wheezing  dextrose Oral Gel 15 Gram(s) Oral once PRN Blood Glucose LESS THAN 70 milliGRAM(s)/deciliter    Vital Signs Last 24 Hrs  T(C): 36.6 (22 @ 22:00), Max: 36.8 (22 @ 15:23)  T(F): 97.8 (22 @ 22:00), Max: 98.3 (22 @ 15:23)  HR: 55 (22 @ 22:00) (55 - 85)  BP: 119/55 (22 @ 22:00) (102/62 - 120/74)  BP(mean): --  RR: 18 (22 @ 22:00) (18 - 18)  SpO2: 100% (22 @ 22:00) (96% - 100%)      Constitutional: No fever, fatigue  Skin: No rash.  Eyes: No recent vision problems or eye pain.  ENT: No congestion, ear pain, or sore throat.  Cardiovascular: No chest pain or palpation.  Respiratory: No cough, shortness of breath, congestion, or wheezing.  Gastrointestinal: No abdominal pain, nausea, vomiting, or diarrhea.  Genitourinary: No dysuria.  Musculoskeletal: No joint swelling.  Neurologic: No headache.    PHYSICAL EXAM:  GENERAL: NAD  EYES: EOMI, PERRLA  NECK: Supple, No JVD  CHEST/LUNG: dec breath sounds at bases  HEART:  S1 , S2 +  ABDOMEN: soft, bs+  EXTREMITIES:  edema+  NEUROLOGY:alert awake oriented      LABS:      135  |  91<L>  |  59<H>  ----------------------------<  213<H>  4.8   |  34<H>  |  1.69<H>    Ca    10.1      26 Aug 2022 05:40  Phos  4.1       Mg     2.20         TPro  7.7  /  Alb  3.6  /  TBili  0.4  /  DBili      /  AST  16  /  ALT  13  /  AlkPhos  95      Creatinine Trend: 1.69 <--, 1.54 <--, 1.48 <--, 1.51 <--, 1.76 <--                        11.8   7.14  )-----------( 264      ( 25 Aug 2022 06:35 )             39.2     Urine Studies:  Urinalysis Basic - ( 24 Aug 2022 00:28 )    Color: Light Yellow / Appearance: Clear / S.021 / pH:   Gluc:  / Ketone: Negative  / Bili: Negative / Urobili: <2 mg/dL   Blood:  / Protein: Negative / Nitrite: Negative   Leuk Esterase: Negative / RBC: 1 /HPF / WBC 1 /HPF   Sq Epi:  / Non Sq Epi: 2 /HPF / Bacteria: Negative              LIVER FUNCTIONS - ( 25 Aug 2022 06:35 )  Alb: 3.6 g/dL / Pro: 7.7 g/dL / ALK PHOS: 95 U/L / ALT: 13 U/L / AST: 16 U/L / GGT: x               Imaging Personally Reviewed:    Consultant(s) Notes Reviewed:  yes    Care Discussed with Consultants/Other Providers:yes

## 2022-08-26 NOTE — DISCHARGE NOTE PROVIDER - PROVIDER TOKENS
PROVIDER:[TOKEN:[[id number]],FOLLOWUP:[1 week]],PROVIDER:[TOKEN:[[id number]],FOLLOWUP:[1 week]],PROVIDER:[TOKEN:[[id number]],FOLLOWUP:[1-3 days]]

## 2022-08-26 NOTE — DIETITIAN INITIAL EVALUATION ADULT - PERTINENT MEDS FT
MEDICATIONS  (STANDING):  atorvastatin 10 milliGRAM(s) Oral at bedtime  budesonide 160 MICROgram(s)/formoterol 4.5 MICROgram(s) Inhaler 2 Puff(s) Inhalation two times a day  buMETAnide 1 milliGRAM(s) Oral two times a day  dextrose 5%. 1000 milliLiter(s) (100 mL/Hr) IV Continuous <Continuous>  dextrose 5%. 1000 milliLiter(s) (50 mL/Hr) IV Continuous <Continuous>  dextrose 50% Injectable 25 Gram(s) IV Push once  dextrose 50% Injectable 12.5 Gram(s) IV Push once  dextrose 50% Injectable 25 Gram(s) IV Push once  ferrous    sulfate 325 milliGRAM(s) Oral daily  glucagon  Injectable 1 milliGRAM(s) IntraMuscular once  heparin   Injectable 5000 Unit(s) SubCutaneous every 8 hours  hydrALAZINE 10 milliGRAM(s) Oral three times a day  insulin glargine Injectable (LANTUS) 21 Unit(s) SubCutaneous at bedtime  insulin lispro (ADMELOG) corrective regimen sliding scale   SubCutaneous three times a day before meals  insulin lispro (ADMELOG) corrective regimen sliding scale   SubCutaneous at bedtime  insulin lispro Injectable (ADMELOG) 7 Unit(s) SubCutaneous before breakfast  insulin lispro Injectable (ADMELOG) 7 Unit(s) SubCutaneous before lunch  insulin lispro Injectable (ADMELOG) 7 Unit(s) SubCutaneous before dinner  metoprolol succinate ER 50 milliGRAM(s) Oral daily  pantoprazole    Tablet 40 milliGRAM(s) Oral before breakfast    MEDICATIONS  (PRN):  acetaminophen     Tablet .. 650 milliGRAM(s) Oral every 6 hours PRN Mild Pain (1 - 3), Moderate Pain (4 - 6), Severe Pain (7 - 10)  ALBUTerol    90 MICROgram(s) HFA Inhaler 2 Puff(s) Inhalation every 6 hours PRN Shortness of Breath and/or Wheezing  dextrose Oral Gel 15 Gram(s) Oral once PRN Blood Glucose LESS THAN 70 milliGRAM(s)/deciliter

## 2022-08-26 NOTE — PROGRESS NOTE ADULT - ATTENDING COMMENTS
New onset DM2, history of prednisone intermittent use past 1 month, presented with hyperglycemia 600s.   Start insulin pen teaching will plan for basal/bolus insulin at dc.  at bedside uses Levemir and Novolog basal/bolus plan at home. They have same insurance. Will recommend Levemir and Novolog plan for patient on dc, doses TBD.  Will follow.    Sarah Ryan MD  Division of Endocrinology  Pager: [phone]    If after 6PM or before 9AM, or on weekends/holidays, please call endocrine answering service for assistance ([phone]).  For nonurgent matters email [email or web address] for assistance.
New onset DM2, history of prednisone intermittent use past 1 month, presented with hyperglycemia 600s.  Today glucose 300s, can resume carb consistent diet (plus add Glucerna per patient request) and will uptitrate basal bolus plan as needed for glucose control. Start insulin pen teaching will need basal/oral or basal/bolus for dc. RD consult.  Will follow.    Sarah Ryan MD  Division of Endocrinology  Pager: [phone]    If after 6PM or before 9AM, or on weekends/holidays, please call endocrine answering service for assistance ([phone]).  For nonurgent matters email [email or web address] for assistance.

## 2022-08-26 NOTE — DISCHARGE NOTE PROVIDER - CARE PROVIDER_API CALL
Rodrigue Amador)  Internal Medicine; Nephrology  [address] [place] [address]  Phone: [phone]  Fax: [phone]  Follow Up Time: 1 week    Renny Newman)  Internal Medicine  [address] [place] [address]  Phone: [phone]  Fax: [phone]  Follow Up Time: 1 week    Moose Eaton ()  Internal Medicine; Pulmonary Disease; Sleep Medicine  [address] [facility], [address]  [place] [address]  Phone: [phone]  Fax: [phone]  Follow Up Time: 1-3 days

## 2022-08-26 NOTE — DIETITIAN INITIAL EVALUATION ADULT - NS FNS DIET ORDER
Diet, Consistent Carbohydrate/No Snacks:   No Concentrated Potassium  Low Sodium  Supplement Feeding Modality:  Oral  Nepro Cans or Servings Per Day:  1       Frequency:  Three Times a day (08-26-22 @ 09:43)

## 2022-08-26 NOTE — DIETITIAN INITIAL EVALUATION ADULT - PERTINENT LABORATORY DATA
08-26    135  |  91<L>  |  59<H>  ----------------------------<  213<H>  4.8   |  34<H>  |  1.69<H>    Ca    10.1      26 Aug 2022 05:40  Phos  4.1     08-26  Mg     2.20     08-26    TPro  7.7  /  Alb  3.6  /  TBili  0.4  /  DBili  x   /  AST  16  /  ALT  13  /  AlkPhos  95  08-25  POCT Blood Glucose.: 251 mg/dL (08-26-22 @ 11:21)  A1C with Estimated Average Glucose Result: 10.7 % (08-23-22 @ 23:18)    CAPILLARY BLOOD GLUCOSE  POCT Blood Glucose.: 251 mg/dL (26 Aug 2022 11:21)  POCT Blood Glucose.: 249 mg/dL (26 Aug 2022 06:37)  POCT Blood Glucose.: 150 mg/dL (25 Aug 2022 21:08)  POCT Blood Glucose.: 259 mg/dL (25 Aug 2022 16:39)  POCT Blood Glucose.: 285 mg/dL (25 Aug 2022 14:21)

## 2022-08-26 NOTE — DIETITIAN INITIAL EVALUATION ADULT - PERSON TAUGHT/METHOD
verbal instruction/written material/individual instruction/pictorial/patient instructed/spouse instructed/daughter instructed

## 2022-08-26 NOTE — DIETITIAN INITIAL EVALUATION ADULT - OTHER INFO
Per chart--- 73 y/o F w/ COPD on home O2(3L) with severe pHTN, OHS/MIGUEL on home biPAP, HTN, HLD admitted for hyperglycemia >600 with c/f HHS.    Pt is reporting good PO intakes. Denies chewing, swallowing difficulties or any nausea, vomiting, diarrhea, constipation.   Met with Pt, Daughter and  and bedside. Spouse has Diabetes and does the cooking at home. Nutrition education provided on identifying CHO containing foods and pairing them with foods that contain adequate fiber, lean protein and healthy fats. Pt drinks soda/juices--encouraged unsweetened beverages. Also encouraged lower sodium meal intakes. Pt and Family enjoys Roe fare, discussed food pairing using commonly eaten foods. Provided with My Plate Planner for visual aid and food lists for consistent CHO meal plan.

## 2022-08-26 NOTE — PROGRESS NOTE ADULT - ASSESSMENT
A/P: 73 y/o F w/ COPD on home O2(3L) with severe pHTN, OHS/MIGUEL on home biPAP, HTN, HLD admitted for hyperglycemia >600 with c/f HHS.  Endocrine consulted for management of hyperglycemia/new T2DM.  Patient does not meet full criteria for HHS (serum glc >600, plasma osmolality >320, AMS), and is responding adequately to insulin.  Needs premeal Admelog.  Please see following recs:    1. Hyperglycemia/DM2  - Standing weight: 107kg   - continue IVF  - Consistent carbohydrate diet unless c/f DKA (anion gap, acidosis, etc.)  - lantus 26u at bedtime and Admelog 9u premeal TID  - moderate dose Admelog ISS pre-meal, moderate dose Admelog ISS at bedtime    - Glucose Target 100 -180 mg/dl; Above goal  - hypoglycemia protocol in place   - Nutrition consult  - Provider to Rn: Insulin pen administration and glucometer teaching prior to discharge. Please send glucometer to [facility] and teach pt with her own glucometer prior to discharge.    DC planning  - ____ lantus at bedtime and ____ Admelog premeal  -Discuss with patient the importance of Carb consistent diet and exercise as tolerated.    -Recommend nutritional consultation  inpt prior to dc   -Discuss glycemic goal of a1c <7% to prevent microvascular complications of diabetes mellitus and reduce the risk of macrovascular complications.  - Make sure patient knows how to inject insulin and check fingersticks with glucometer (ask bedside RN for teaching)  - pt on kinetics and action of basal and prandial insulin.    Pt should call their doctor when FSBG <70 or above >400 and or consistently above 200s as changes in the regimen will have to be made.  -pt should call PMD for DM related questions or concerns until pt is seen by CDE or endocrine   -Recommend annual podiatry and ophthalmology follow up.       If patient wishes to follow up with [facility] Endocrinology     [facility]  [address] [place] [address], [address], [place], [place] [id number] [phone]   Please call to schedule appointment with CDE/Nutritionist and MD appointment next available   Ensure patient has working glucometer, test strips, lancets, alcohol pads, and BD sharan pen needles  Please also prescribe glucose tabs, Baqsimi nasal spray or glucagon emergency kit for hypoglycemia risk         2. Steroid use  - Patient intermittently using prednisone 10mg for SOB, last dose 2 days ago.  - BPs stable, steroid withdrawal symptoms of less concern  - Continue to hold prednisone  she is not on long  term steroids therefore risk of secondary AI is decreased       3. HTN  - on bumex 1mg bid, hydralazine 25mg tid, goal <130/80 and >100/60  - outpt mc/cr    4. HLD  - on atorvastatin 10mg qd  - outpatient lipid panel    -Hannah Boogie, PGY-1    NOTE INCOMPLETE UNLESS SIGNED BY ATTENDING       A/P: 73 y/o F w/ COPD on home O2(3L) with severe pHTN, OHS/MIGUEL on home biPAP, HTN, HLD admitted for hyperglycemia >600 with c/f HHS.  Endocrine consulted for management of hyperglycemia/new T2DM.  Patient does not meet full criteria for HHS (serum glc >600, plasma osmolality >320, AMS), and is responding adequately to insulin.  Decreased response to bedtime lantus may be due to snacking at night.  52u total insulin yesterday, so will recalculate bedtime lantus and premeal ademelog.  Please see following recs:    1. Hyperglycemia/DM2  - Standing weight: 107kg   - continue IVF  - Consistent carbohydrate diet unless c/f DKA (anion gap, acidosis, etc.)  - lantus 26u at bedtime and Admelog 9u premeal TID  - moderate dose Admelog ISS pre-meal, moderate dose Admelog ISS at bedtime    - Glucose Target 100 -180 mg/dl; Above goal  - hypoglycemia protocol in place   - Nutrition consult  - Provider to Rn: Insulin pen administration and glucometer teaching prior to discharge. Please send glucometer to [facility] and teach pt with her own glucometer prior to discharge.    DC planning  - ____ lantus at bedtime and ____ Admelog premeal  -Discuss with patient the importance of Carb consistent diet and exercise as tolerated.    -Recommend nutritional consultation  inpt prior to dc   -Discuss glycemic goal of a1c <7% to prevent microvascular complications of diabetes mellitus and reduce the risk of macrovascular complications.  - Make sure patient knows how to inject insulin and check fingersticks with glucometer (ask bedside RN for teaching)  -  pt on kinetics and action of basal and prandial insulin.    Pt should call their doctor when FSBG <70 or above >400 and or consistently above 200s as changes in the regimen will have to be made.  -pt should call PMD for DM related questions or concerns until pt is seen by CDE or endocrine   -Recommend annual podiatry and ophthalmology follow up.       Patient has follow-up with [facility] Endocrinology    NP Linda 9/9 1:30 PM   Dr. Henderson 11/14 12:20 PM  [facility]  [address] [place] [address], [address], [place] [address]  [phone]   Please call to schedule appointment with CDE/Nutritionist and MD appointment next available   Ensure patient has working glucometer, test strips, lancets, alcohol pads, and BD sharan pen needles  Please also prescribe glucose tabs, Baqsimi nasal spray or glucagon emergency kit for hypoglycemia risk         2. Steroid use  - Patient intermittently using prednisone 10mg for SOB, last dose 2 days ago.  - BPs stable, steroid withdrawal symptoms of less concern  - Continue to hold prednisone  she is not on long  term steroids therefore risk of secondary AI is decreased       3. HTN  - on bumex 1mg bid, hydralazine 25mg tid, goal <130/80 and >100/60  - outpt mc/cr    4. HLD  - on atorvastatin 10mg qd  - outpatient lipid panel    -Hannah Boogie, PGY-1    NOTE INCOMPLETE UNLESS SIGNED BY ATTENDING       A/P: 71 y/o F w/ COPD on home O2(3L) with severe pHTN, OHS/MIGUEL on home biPAP, HTN, HLD admitted for hyperglycemia >600 with c/f HHS.  Endocrine consulted for management of hyperglycemia/new T2DM.  Patient does not meet full criteria for HHS (serum glc >600, plasma osmolality >320, AMS), and is responding adequately to insulin.  Decreased response to bedtime lantus may be due to snacking at night.  58u total insulin past 24 hrs, so will recalculate bedtime lantus and premeal ademelog.  Please see following recs:    1. Hyperglycemia/DM2  - Standing weight: 107kg   - continue IVF  - Consistent carbohydrate diet unless c/f DKA (anion gap, acidosis, etc.)  - lantus 29u at bedtime and Admelog 9u premeal TID  - moderate dose Admelog ISS pre-meal, moderate dose Admelog ISS at bedtime    - Glucose Target 100 -180 mg/dl; Above goal  - hypoglycemia protocol in place   - Nutrition consult  - Provider to Rn: Insulin pen administration and glucometer teaching prior to discharge. Please send glucometer to [facility] and teach pt with her own glucometer prior to discharge.    DC planning  - ____ lantus at bedtime and ____ Admelog premeal  -Discuss with patient the importance of Carb consistent diet and exercise as tolerated.    -Recommend nutritional consultation  inpt prior to dc   -Discuss glycemic goal of a1c <7% to prevent microvascular complications of diabetes mellitus and reduce the risk of macrovascular complications.  - Make sure patient knows how to inject insulin and check fingersticks with glucometer (ask bedside RN for teaching)  -  pt on kinetics and action of basal and prandial insulin.    Pt should call their doctor when FSBG <70 or above >400 and or consistently above 200s as changes in the regimen will have to be made.  -pt should call PMD for DM related questions or concerns until pt is seen by CDE or endocrine   -Recommend annual podiatry and ophthalmology follow up.       Patient has follow-up with [facility] Endocrinology    NP Linda 9/9 1:30 PM   Dr. Henderson 11/14 12:20 PM  [facility]  [address] [place] [address], [address], [place] [address]  [phone]   Please call to schedule appointment with CDE/Nutritionist and MD appointment next available   Ensure patient has working glucometer, test strips, lancets, alcohol pads, and BD sharan pen needles  Please also prescribe glucose tabs, Baqsimi nasal spray or glucagon emergency kit for hypoglycemia risk         2. Steroid use  - Patient intermittently using prednisone 10mg for SOB, last dose 2 days ago.  - BPs stable, steroid withdrawal symptoms of less concern  - Continue to hold prednisone  she is not on long  term steroids therefore risk of secondary AI is decreased       3. HTN  - on bumex 1mg bid, hydralazine 25mg tid, goal <130/80 and >100/60  - outpt mc/cr    4. HLD  - on atorvastatin 10mg qd  - outpatient lipid panel    -Hannah Boogie, PGY-1    NOTE INCOMPLETE UNLESS SIGNED BY ATTENDING       A/P: 71 y/o F w/ COPD on home O2(3L) with severe pHTN, OHS/MIGUEL on home biPAP, HTN, HLD admitted for hyperglycemia >600 with c/f HHS.  Endocrine consulted for management of hyperglycemia/new T2DM.  Patient does not meet full criteria for HHS (serum glc >600, plasma osmolality >320, AMS), and is responding adequately to insulin.  Decreased response to bedtime lantus may be due to snacking at night.  58u total insulin past 24 hrs, so will recalculate bedtime lantus and premeal ademelog.  Please see following recs:    1. Hyperglycemia/DM2  - Standing weight: 107kg   - continue IVF  - Consistent carbohydrate diet unless c/f DKA (anion gap, acidosis, etc.)  - lantus 30u at bedtime and Admelog 9u premeal TID  - moderate dose Admelog ISS pre-meal, moderate dose Admelog ISS at bedtime    - Glucose Target 100 -180 mg/dl; Above goal  - hypoglycemia protocol in place   - Nutrition consult  - Provider to Rn: Insulin pen administration and glucometer teaching prior to discharge. Please send glucometer to [facility] and teach pt with her own glucometer prior to discharge.    DC planning  - ____ levemir at bedtime and ____ novolog premeal  -Discuss with patient the importance of Carb consistent diet and exercise as tolerated.    -Recommend nutritional consultation  inpt prior to dc   -Discuss glycemic goal of a1c <7% to prevent microvascular complications of diabetes mellitus and reduce the risk of macrovascular complications.  - Make sure patient knows how to inject insulin and check fingersticks with glucometer (ask bedside RN for teaching)  -  pt on kinetics and action of basal and prandial insulin.    Pt should call their doctor when FSBG <70 or above >400 and or consistently above 200s as changes in the regimen will have to be made.  -pt should call PMD for DM related questions or concerns until pt is seen by CDE or endocrine   -Recommend annual podiatry and ophthalmology follow up.       Patient has follow-up with [facility] Endocrinology    CARIN Mckeon 9/9 1:30 PM   Dr. Henderson 11/14 12:20 PM  [facility]  [address] [place] [address], [address], [place] [address]  [phone]   Please call to schedule appointment with CDE/Nutritionist and MD appointment next available   Ensure patient has working glucometer, test strips, lancets, alcohol pads, and BD sharan pen needles  Please also prescribe glucose tabs, Baqsimi nasal spray or glucagon emergency kit for hypoglycemia risk         2. Steroid use  - Patient intermittently using prednisone 10mg for SOB, last dose 2 days ago.  - BPs stable, steroid withdrawal symptoms of less concern  - Continue to hold prednisone  she is not on long  term steroids therefore risk of secondary AI is decreased       3. HTN  - on bumex 1mg bid, hydralazine 25mg tid, goal <130/80 and >100/60  - outpt mc/cr    4. HLD  - on atorvastatin 10mg qd  - outpatient lipid panel    -Hannah Boogie, PGY-1    NOTE INCOMPLETE UNLESS SIGNED BY ATTENDING       A/P: 71 y/o F w/ COPD on home O2(3L) with severe pHTN, OHS/MIGUEL on home biPAP, HTN, HLD admitted for hyperglycemia >600 with c/f HHS.  Endocrine consulted for management of hyperglycemia/new T2DM.  Patient does not meet full criteria for HHS (serum glc >600, plasma osmolality >320, AMS), and is responding adequately to insulin.  Decreased response to bedtime lantus may be due to snacking at night.  58u total insulin past 24 hrs, so will recalculate bedtime lantus and premeal ademelog.  Please see following recs:    1. Hyperglycemia/DM2  - Standing weight: 107kg   - continue IVF  - Consistent carbohydrate diet unless c/f DKA (anion gap, acidosis, etc.)  - Lantus 30 units at bedtime and Admelog 9 units premeal TID  - moderate dose Admelog ISS pre-meal, moderate dose Admelog ISS at bedtime    - Glucose Target 100 -180 mg/dl; Above goal  - hypoglycemia protocol in place   - Nutrition consult  - Provider to Rn: Insulin pen administration and glucometer teaching prior to discharge. Please send glucometer to [facility] and teach pt with her own glucometer prior to discharge.    DC planning  - ____ levemir at bedtime and ____ novolog premeal  -Discuss with patient the importance of Carb consistent diet and exercise as tolerated.    -Recommend nutritional consultation  inpt prior to dc   -Discuss glycemic goal of a1c <7% to prevent microvascular complications of diabetes mellitus and reduce the risk of macrovascular complications.  - Make sure patient knows how to inject insulin and check fingersticks with glucometer (ask bedside RN for teaching)  -  pt on kinetics and action of basal and prandial insulin.    Pt should call their doctor when FSBG <70 or above >400 and or consistently above 200s as changes in the regimen will have to be made.  -pt should call PMD for DM related questions or concerns until pt is seen by CDE or endocrine   -Recommend annual podiatry and ophthalmology follow up.       Patient has follow-up with [facility] Endocrinology    CARIN Mckeon 9/9 1:30 PM   Dr. Henderson 11/14 12:20 PM  [facility]  [address] [place] [address], [address], [place] [address]  [phone]   Please call to schedule appointment with CDE/Nutritionist and MD appointment next available   Ensure patient has working glucometer, test strips, lancets, alcohol pads, and BD sharan pen needles  Please also prescribe glucose tabs, Baqsimi nasal spray or glucagon emergency kit for hypoglycemia risk         2. Steroid use  - Patient intermittently using prednisone 10mg for SOB, last dose 2 days ago.  - BPs stable, steroid withdrawal symptoms of less concern  - Continue to hold prednisone  she is not on long  term steroids therefore risk of secondary AI is decreased       3. HTN  - on bumex 1mg bid, hydralazine 25mg tid, goal <130/80 and >100/60  - outpt mc/cr    4. HLD  - on atorvastatin 10mg qd  - outpatient lipid panel    -Hannah Boogie, PGY-1    NOTE INCOMPLETE UNLESS SIGNED BY ATTENDING

## 2022-08-26 NOTE — PROGRESS NOTE ADULT - ASSESSMENT
71 y/o F with pmhx of COPD on home O2(3L) with severe pHTN, OHS/MIGUEL on home biPAP, HTN, HLD who presents with 5 days of lightheadedness and fatigue. Found to be hyperglycemic to >600 with normal AG and no ketones, concern for HHS in setting of new onset diabetes.     Problem/Plan - 1:  ·  Problem: Diabetes mellitus, new onset.   ·  Plan: - patient with polyuria, polydipsia, Blood sugar >600, BHB negative, normal AG not consistent with DKA, likely HHS in setting of intermittent use of steroids for COPD (10mg BID)  - patient has been on and off steroids since January for COPD exacerbations, steroid induced diabetes vs latent T2DM onset/  - HgbA1c 10.7, (previous A1c 5.6(2021), 5.8(2020)); was prescribed empagliflozin earlier in year( per [employer]) but no diagnosis of diabetes in past?  - will start on 10 units qhs with ISS, based on requirements can adjust accordingly  - Endo consult   - f/u MARY, Islet cell antibody, c-peptide     - endocrine consult given new onset.    Problem/Plan - 2:  ·  Problem: Hyperglycemia due to diabetes mellitus.   ·  Plan: -.    Problem/Plan - 3:  ·  Problem: Acute kidney injury superimposed on CKD.  ·  Plan: - Cr 1.76, baseline appears (1.3-1.5)  - likely in setting of osmotic diuresis 2/2 to hyperglycemia, causing prerenal azotemia (BUN/Cr ratio >20)  - received 500cc fluids in ED  - will give gentle IVF.    Problem/Plan - 4:  ·  Problem: Chronic diastolic heart failure.  ·  Plan: - last EF 2020 50%, with notable severe pHTN (last in 2021)   - will repeat TTE   - c/w bumex 1mg BID.    Problem/Plan - 5:  ·  Problem: MIGUEL treated with BiPAP.  ·  Plan: - uses BiPAP at night, unsure of home setting   - c/w nocturnal biPAP.    Problem/Plan - 6:  ·  Problem: Hypertension.  ·  Plan: - c/w hydralazine 25 TID.    Problem/Plan - 7:  ·  Problem: COPD, severe.  ·  Plan: - on Home O2 3L continuously  - last echo in 2021,consistent with severe pHTN   - continue with O2, maintain O2 between 88-92%  - c/w symbicort,  alubterol PRN.    Problem/Plan - 8:  ·  Problem: Chronic atrial fibrillation.  ·  Plan: - not on any AC, s/p Ablation  - c/w metoprolol 50 qd.    Problem/Plan - 9:  ·  Problem: HLD (hyperlipidemia).   ·  Plan: - c/w simvastatin.

## 2022-08-26 NOTE — DISCHARGE NOTE PROVIDER - CARE PROVIDERS DIRECT ADDRESSES
,DirectAddress_Unknown,DirectAddress_Unknown,[email or web address]

## 2022-08-26 NOTE — PROGRESS NOTE ADULT - SUBJECTIVE AND OBJECTIVE BOX
[facility] NEPHROLOGY- PROGRESS NOTE    72 year old Female with history of COPD, CHF presents with weakness. Nephrology consulted for elevated Scr.    REVIEW OF SYSTEMS:  Gen: no changes in weight, + weakness with back pain  Cards: no chest pain  Resp: + dyspnea (chronic)  GI: no nausea or vomiting or diarrhea  Vascular: + LE edema    No Known Allergies      Hospital Medications: Medications reviewed      VITALS:  T(F): 98 (22 @ 06:00), Max: 98.1 (22 @ 18:00)  HR: 74 (22 @ 07:03)  BP: 115/70 (22 @ 06:00)  RR: 18 (22 @ 06:00)  SpO2: 99% (22 @ 07:03)  Wt(kg): --      PHYSICAL EXAM:    Gen: NAD, calm  Cards: RRR, +S1/S2, no M/G/R  Resp: CTA B/L  GI: soft, NT/ND, NABS  Vascular: trace LE edema B/L      LABS:      135  |  91<L>  |  59<H>  ----------------------------<  213<H>  4.8   |  34<H>  |  1.69<H>    Ca    10.1      26 Aug 2022 05:40  Phos  4.1       Mg     2.20         TPro  7.7  /  Alb  3.6  /  TBili  0.4  /  DBili      /  AST  16  /  ALT  13  /  AlkPhos  95      Creatinine Trend: 1.69 <--, 1.54 <--, 1.48 <--, 1.51 <--, 1.76 <--                        11.8   7.14  )-----------( 264      ( 25 Aug 2022 06:35 )             39.2     Urine Studies:  Urinalysis Basic - ( 24 Aug 2022 00:28 )    Color: Light Yellow / Appearance: Clear / S.021 / pH:   Gluc:  / Ketone: Negative  / Bili: Negative / Urobili: <2 mg/dL   Blood:  / Protein: Negative / Nitrite: Negative   Leuk Esterase: Negative / RBC: 1 /HPF / WBC 1 /HPF   Sq Epi:  / Non Sq Epi: 2 /HPF / Bacteria: Negative

## 2022-08-26 NOTE — PROGRESS NOTE ADULT - SUBJECTIVE AND OBJECTIVE BOX
PATIENT SEEN AND EXAMINED ON :- 8/26/22  DATE OF SERVICE:   8/26/22          Interim events noted,Labs ,Radiological studies and Cardiology tests reviewed .       HOSPITAL COURSE: HPI:  73 y/o F with pmhx of COPD on home O2(3L) with severe pHTN, OHS/MIGUEL on home biPAP, HTN, HLD who presents with 5 days of lightheadedness and fatigue. Patient states that she has been feeling off for the last few days .Endorse increased fatigue stating she feels "very slow".  She state that at times she feels like the room is spinning.  She denies any fevers, chills, headaches chest pain or increased cough. She has SOB at baseline and uses 3L O2 continuously she denies any increased SOB. She endorse vision changes, stating that "everything seems darker even when there is light outside". She denies any blurry vision or loss of vision. She also endorse polydipsia nad polyuria. Denies polyphagia but does endorse decreased appetite. She states that she sleeps with 3 pillows at night which is normal for her. Also endorse nocturnal dyspnea that has improved since she has been using her bipap at night.   Patient states that she has been using prednisone intermittently for the last month for SOB. She states she was prescribed 10mg BID and states she only takes the medication when she is feeling more dyspnea that her baseline. She states her last dose was 2 days ago. She states that her symptoms progressively worsened to the pint that she felt helpless and decided to call EMS. In EMS her sugars were >500.     In the ED T. BS were measures >600 x2. She was given 5 units of insulin with improvement to 300s. BHB negative. Admitted to medicine for further management.     (24 Aug 2022 08:35)      INTERIM EVENTS:Patient seen at bedside ,interim events noted.      PMH -reviewed admission note, no change since admission  HEART FAILURE: Acute[ ]Chronic[ ] Systolic[ ] Diastolic[ ] Combined Systolic and Diastolic[ ]  CAD[ ] CABG[ ] PCI[ ]  DEVICES[ ] PPM[ ] ICD[ ] ILR[ ]  ATRIAL FIBRILLATION[ ] Paroxysmal[ ] Permanent[ ] CHADS2-[  ]  MISAEL[ ] CKD1[ ] CKD2[ ] CKD3[ ] CKD4[ ] ESRD[ ]  COPD[ ] HTN[ ]   DM[ ] Type1[ ] Type 2[ ]   CVA[ ] Paresis[ ]    AMBULATION: Assisted[ ] Cane/walker[ ] Independent[ ]    MEDICATIONS  (STANDING):  atorvastatin 10 milliGRAM(s) Oral at bedtime  budesonide 160 MICROgram(s)/formoterol 4.5 MICROgram(s) Inhaler 2 Puff(s) Inhalation two times a day  buMETAnide 1 milliGRAM(s) Oral two times a day  dextrose 5%. 1000 milliLiter(s) (100 mL/Hr) IV Continuous <Continuous>  dextrose 5%. 1000 milliLiter(s) (50 mL/Hr) IV Continuous <Continuous>  dextrose 50% Injectable 25 Gram(s) IV Push once  dextrose 50% Injectable 12.5 Gram(s) IV Push once  dextrose 50% Injectable 25 Gram(s) IV Push once  ferrous    sulfate 325 milliGRAM(s) Oral daily  glucagon  Injectable 1 milliGRAM(s) IntraMuscular once  heparin   Injectable 5000 Unit(s) SubCutaneous every 8 hours  hydrALAZINE 10 milliGRAM(s) Oral three times a day  insulin glargine Injectable (LANTUS) 21 Unit(s) SubCutaneous at bedtime  insulin lispro (ADMELOG) corrective regimen sliding scale   SubCutaneous three times a day before meals  insulin lispro (ADMELOG) corrective regimen sliding scale   SubCutaneous at bedtime  insulin lispro Injectable (ADMELOG) 7 Unit(s) SubCutaneous before breakfast  insulin lispro Injectable (ADMELOG) 7 Unit(s) SubCutaneous before lunch  insulin lispro Injectable (ADMELOG) 7 Unit(s) SubCutaneous before dinner  metoprolol succinate ER 50 milliGRAM(s) Oral daily  pantoprazole    Tablet 40 milliGRAM(s) Oral before breakfast    MEDICATIONS  (PRN):  acetaminophen     Tablet .. 650 milliGRAM(s) Oral every 6 hours PRN Mild Pain (1 - 3), Moderate Pain (4 - 6), Severe Pain (7 - 10)  ALBUTerol    90 MICROgram(s) HFA Inhaler 2 Puff(s) Inhalation every 6 hours PRN Shortness of Breath and/or Wheezing  dextrose Oral Gel 15 Gram(s) Oral once PRN Blood Glucose LESS THAN 70 milliGRAM(s)/deciliter            REVIEW OF SYSTEMS:  Constitutional: [ ] fever, [ ]weight loss,  [ ]fatigue [ ]weight gain  Eyes: [ ] visual changes  Respiratory: [ ]shortness of breath;  [ ] cough, [ ]wheezing, [ ]chills, [ ]hemoptysis  Cardiovascular: [ ] chest pain, [ ]palpitations, [ ]dizziness,  [ ]leg swelling[ ]orthopnea[ ]PND  Gastrointestinal: [ ] abdominal pain, [ ]nausea, [ ]vomiting,  [ ]diarrhea [ ]Constipation [ ]Melena  Genitourinary: [ ] dysuria, [ ] hematuria [ ]Junior  Neurologic: [ ] headaches [ ] tremors[ ]weakness [ ]Paralysis Right[ ] Left[ ]  Skin: [ ] itching, [ ]burning, [ ] rashes  Endocrine: [ ] heat or cold intolerance  Musculoskeletal: [ ] joint pain or swelling; [ ] muscle, back, or extremity pain  Psychiatric: [ ] depression, [ ]anxiety, [ ]mood swings, or [ ]difficulty sleeping  Hematologic: [ ] easy bruising, [ ] bleeding gums    [ ] All remaining systems negative except as per above.   [ ]Unable to obtain.  [x] No change in ROS since admission      Vital Signs Last 24 Hrs  T(C): 36.7 (26 Aug 2022 06:00), Max: 36.7 (25 Aug 2022 18:00)  T(F): 98 (26 Aug 2022 06:00), Max: 98.1 (25 Aug 2022 18:00)  HR: 74 (26 Aug 2022 07:03) (61 - 86)  BP: 115/70 (26 Aug 2022 06:00) (97/47 - 142/57)  BP(mean): --  RR: 18 (26 Aug 2022 06:00) (17 - 19)  SpO2: 99% (26 Aug 2022 07:03) (97% - 100%)    Parameters below as of 26 Aug 2022 06:00  Patient On (Oxygen Delivery Method): nasal cannula  O2 Flow (L/min): 2    I&O's Summary      PHYSICAL EXAM:  General: No acute distress BMI-  HEENT: EOMI, PERRL  Neck: Supple, [ ] JVD  Lungs: Equal air entry bilaterally; [ ] rales [ ] wheezing [ ] rhonchi  Heart: Regular rate and rhythm; [x ] murmur   2/6 [ x] systolic [ ] diastolic [ ] radiation[ ] rubs [ ]  gallops  Abdomen: Nontender, bowel sounds present  Extremities: No clubbing, cyanosis, [ ] edema [ ]Pulses  equal and intact  Nervous system:  Alert & Oriented X3, no focal deficits  Psychiatric: Normal affect  Skin: No rashes or lesions    LABS:  08-26    135  |  91<L>  |  59<H>  ----------------------------<  213<H>  4.8   |  34<H>  |  1.69<H>    Ca    10.1      26 Aug 2022 05:40  Phos  4.1     08-26  Mg     2.20     08-26    TPro  7.7  /  Alb  3.6  /  TBili  0.4  /  DBili  x   /  AST  16  /  ALT  13  /  AlkPhos  95  08-25    Creatinine Trend: 1.69<--, 1.54<--, 1.48<--, 1.51<--, 1.76<--                        11.8   7.14  )-----------( 264      ( 25 Aug 2022 06:35 )             39.2     CONCLUSIONS:  1. Mechanical prosthetic mitral valve replacement. Minimal  mitral regurgitation. Mean transmitral valve gradient  equals 6 mm Hg, which is elevated even in the setting ofa  prosthetic valve.  2. Endocardium not well visualized; grossly normal left  ventricular systolic function. Endocardial visualization  enhanced with intravenous injection of echo contrast  (Definity). Septal consistent with right ventricular  overload.  3. Right ventricular enlargement with decreased right  ventricular systolic function.  ------------------------------------------------------------------------

## 2022-08-26 NOTE — DISCHARGE NOTE PROVIDER - NSDCMRMEDTOKEN_GEN_ALL_CORE_FT
budesonide 0.5 mg/2 mL inhalation suspension: 1 puff(s) inhaled 2 times a day   budesonide-formoterol 160 mcg-4.5 mcg/inh inhalation aerosol: 2 puff(s) inhaled 2 times a day   bumetanide 1 mg oral tablet: 1 tab(s) orally 2 times a day  ferrous sulfate 324 mg (65 mg elemental iron) oral tablet: 1 tab(s) orally once a day  hydrALAZINE 25 mg oral tablet: 1 tab(s) orally 3 times a day  metoprolol succinate 50 mg oral tablet, extended release: 1 tab(s) orally once a day  predniSONE 10 mg oral tablet: 1 tab(s) orally 2 times a day  ProAir HFA 90 mcg/inh inhalation aerosol: 2 puff(s) inhaled every 6 hours  Protonix 40 mg oral delayed release tablet: 1 tab(s) orally once a day   simvastatin 10 mg oral tablet: 1 tab(s) orally once a day (at bedtime)   alcohol swabs : Apply topically to affected area 4 times a day   budesonide 0.5 mg/2 mL inhalation suspension: 1 puff(s) inhaled 2 times a day   budesonide-formoterol 160 mcg-4.5 mcg/inh inhalation aerosol: 2 puff(s) inhaled 2 times a day   bumetanide 1 mg oral tablet: 1 tab(s) orally 2 times a day  ferrous sulfate 324 mg (65 mg elemental iron) oral tablet: 1 tab(s) orally once a day  glucometer (per patient&#x27;s insurance): Test blood sugars four times a day. Dispense #1 glucometer.  hydrALAZINE 25 mg oral tablet: 1 tab(s) orally 3 times a day  Insulin Pen Needles, 4mm: 1 application subcutaneously 4 times a day. ** Use with insulin pen **   lancets: 1 application subcutaneously 4 times a day   metoprolol succinate 50 mg oral tablet, extended release: 1 tab(s) orally once a day  predniSONE 10 mg oral tablet: 1 tab(s) orally 2 times a day  ProAir HFA 90 mcg/inh inhalation aerosol: 2 puff(s) inhaled every 6 hours  Protonix 40 mg oral delayed release tablet: 1 tab(s) orally once a day   simvastatin 10 mg oral tablet: 1 tab(s) orally once a day (at bedtime)   alcohol swabs : Apply topically to affected area 4 times a day   budesonide 0.5 mg/2 mL inhalation suspension: 1 puff(s) inhaled 2 times a day   budesonide-formoterol 160 mcg-4.5 mcg/inh inhalation aerosol: 2 puff(s) inhaled 2 times a day   bumetanide 1 mg oral tablet: 1 tab(s) orally 2 times a day  ferrous sulfate 324 mg (65 mg elemental iron) oral tablet: 1 tab(s) orally once a day  glucometer (per patient&#x27;s insurance): Test blood sugars four times a day. Dispense #1 glucometer.  HumaLOG KwikPen 100 units/mL injectable solution: 18 unit(s) injectable 3 times a day (before meals)   hydrALAZINE 25 mg oral tablet: 1 tab(s) orally 3 times a day  Insulin Pen Needles, 4mm: 1 application subcutaneously 4 times a day. ** Use with insulin pen **   lancets: 1 application subcutaneously 4 times a day   Lantus Solostar Pen 100 units/mL subcutaneous solution: 40 unit(s) subcutaneous once a day (at bedtime)   metoprolol succinate 50 mg oral tablet, extended release: 1 tab(s) orally once a day  predniSONE 10 mg oral tablet: 1 tab(s) orally 2 times a day  ProAir HFA 90 mcg/inh inhalation aerosol: 2 puff(s) inhaled every 6 hours  Protonix 40 mg oral delayed release tablet: 1 tab(s) orally once a day   simvastatin 10 mg oral tablet: 1 tab(s) orally once a day (at bedtime)   alcohol swabs : Apply topically to affected area 4 times a day   budesonide-formoterol 160 mcg-4.5 mcg/inh inhalation aerosol: 2 puff(s) inhaled 2 times a day   bumetanide 1 mg oral tablet: 2 tab(s) orally 2 times a day   ferrous sulfate 324 mg (65 mg elemental iron) oral tablet: 1 tab(s) orally once a day  glucometer (per patient&#x27;s insurance): Test blood sugars four times a day. Dispense #1 glucometer.  hydrALAZINE 10 mg oral tablet: 1 tab(s) orally 3 times a day  Insulin Pen Needles, 4mm: 1 application subcutaneously 4 times a day. ** Use with insulin pen **   lancets: 1 application subcutaneously 4 times a day   Levemir FlexTouch 100 units/mL subcutaneous solution: 25 unit(s) subcutaneous once a day (at bedtime)   lidocaine 4% topical film: Apply topically to affected area once a day  meclizine 12.5 mg oral tablet: 1 tab(s) orally 3 times a day  metoprolol succinate 50 mg oral tablet, extended release: 1 tab(s) orally once a day  NovoLOG FlexPen 100 units/mL injectable solution: 10 unit(s) injectable 3 times a day (before meals)   ProAir HFA 90 mcg/inh inhalation aerosol: 2 puff(s) inhaled every 6 hours  Protonix 40 mg oral delayed release tablet: 1 tab(s) orally once a day   simvastatin 10 mg oral tablet: 1 tab(s) orally once a day (at bedtime)

## 2022-08-27 LAB
ANION GAP SERPL CALC-SCNC: 11 MMOL/L — SIGNIFICANT CHANGE UP (ref 7–14)
BUN SERPL-MCNC: 70 MG/DL — HIGH (ref 7–23)
CALCIUM SERPL-MCNC: 9.8 MG/DL — SIGNIFICANT CHANGE UP (ref 8.4–10.5)
CHLORIDE SERPL-SCNC: 93 MMOL/L — LOW (ref 98–107)
CO2 SERPL-SCNC: 29 MMOL/L — SIGNIFICANT CHANGE UP (ref 22–31)
CREAT SERPL-MCNC: 1.89 MG/DL — HIGH (ref 0.5–1.3)
EGFR: 28 ML/MIN/1.73M2 — LOW
GLUCOSE BLDC GLUCOMTR-MCNC: 209 MG/DL — HIGH (ref 70–99)
GLUCOSE BLDC GLUCOMTR-MCNC: 224 MG/DL — HIGH (ref 70–99)
GLUCOSE BLDC GLUCOMTR-MCNC: 229 MG/DL — HIGH (ref 70–99)
GLUCOSE BLDC GLUCOMTR-MCNC: 302 MG/DL — HIGH (ref 70–99)
GLUCOSE SERPL-MCNC: 301 MG/DL — HIGH (ref 70–99)
HCT VFR BLD CALC: 36.9 % — SIGNIFICANT CHANGE UP (ref 34.5–45)
HGB BLD-MCNC: 10.9 G/DL — LOW (ref 11.5–15.5)
ISLET CELL512 AB SER-ACNC: SIGNIFICANT CHANGE UP
MAGNESIUM SERPL-MCNC: 2.2 MG/DL — SIGNIFICANT CHANGE UP (ref 1.6–2.6)
MCHC RBC-ENTMCNC: 29.2 PG — SIGNIFICANT CHANGE UP (ref 27–34)
MCHC RBC-ENTMCNC: 29.5 GM/DL — LOW (ref 32–36)
MCV RBC AUTO: 98.9 FL — SIGNIFICANT CHANGE UP (ref 80–100)
NRBC # BLD: 0 /100 WBCS — SIGNIFICANT CHANGE UP (ref 0–0)
NRBC # FLD: 0 K/UL — SIGNIFICANT CHANGE UP (ref 0–0)
PHOSPHATE SERPL-MCNC: 3.9 MG/DL — SIGNIFICANT CHANGE UP (ref 2.5–4.5)
PLATELET # BLD AUTO: 209 K/UL — SIGNIFICANT CHANGE UP (ref 150–400)
POTASSIUM SERPL-MCNC: 4.6 MMOL/L — SIGNIFICANT CHANGE UP (ref 3.5–5.3)
POTASSIUM SERPL-SCNC: 4.6 MMOL/L — SIGNIFICANT CHANGE UP (ref 3.5–5.3)
RBC # BLD: 3.73 M/UL — LOW (ref 3.8–5.2)
RBC # FLD: 13.1 % — SIGNIFICANT CHANGE UP (ref 10.3–14.5)
SODIUM SERPL-SCNC: 133 MMOL/L — LOW (ref 135–145)
WBC # BLD: 5.39 K/UL — SIGNIFICANT CHANGE UP (ref 3.8–10.5)
WBC # FLD AUTO: 5.39 K/UL — SIGNIFICANT CHANGE UP (ref 3.8–10.5)

## 2022-08-27 PROCEDURE — 99232 SBSQ HOSP IP/OBS MODERATE 35: CPT

## 2022-08-27 PROCEDURE — 71045 X-RAY EXAM CHEST 1 VIEW: CPT | Mod: 26

## 2022-08-27 RX ORDER — BUMETANIDE 0.25 MG/ML
2 INJECTION INTRAMUSCULAR; INTRAVENOUS DAILY
Refills: 0 | Status: DISCONTINUED | OUTPATIENT
Start: 2022-08-28 | End: 2022-08-28

## 2022-08-27 RX ORDER — IPRATROPIUM/ALBUTEROL SULFATE 18-103MCG
3 AEROSOL WITH ADAPTER (GRAM) INHALATION EVERY 6 HOURS
Refills: 0 | Status: DISCONTINUED | OUTPATIENT
Start: 2022-08-27 | End: 2022-08-28

## 2022-08-27 RX ORDER — LIDOCAINE 4 G/100G
1 CREAM TOPICAL DAILY
Refills: 0 | Status: DISCONTINUED | OUTPATIENT
Start: 2022-08-27 | End: 2022-09-07

## 2022-08-27 RX ORDER — INSULIN GLARGINE 100 [IU]/ML
35 INJECTION, SOLUTION SUBCUTANEOUS AT BEDTIME
Refills: 0 | Status: DISCONTINUED | OUTPATIENT
Start: 2022-08-27 | End: 2022-08-28

## 2022-08-27 RX ORDER — INSULIN LISPRO 100/ML
11 VIAL (ML) SUBCUTANEOUS
Refills: 0 | Status: DISCONTINUED | OUTPATIENT
Start: 2022-08-27 | End: 2022-08-28

## 2022-08-27 RX ORDER — BUMETANIDE 0.25 MG/ML
2 INJECTION INTRAMUSCULAR; INTRAVENOUS DAILY
Refills: 0 | Status: DISCONTINUED | OUTPATIENT
Start: 2022-08-27 | End: 2022-08-27

## 2022-08-27 RX ADMIN — HEPARIN SODIUM 5000 UNIT(S): 5000 INJECTION INTRAVENOUS; SUBCUTANEOUS at 05:46

## 2022-08-27 RX ADMIN — Medication 8: at 07:46

## 2022-08-27 RX ADMIN — Medication 10 MILLIGRAM(S): at 21:35

## 2022-08-27 RX ADMIN — INSULIN GLARGINE 35 UNIT(S): 100 INJECTION, SOLUTION SUBCUTANEOUS at 21:34

## 2022-08-27 RX ADMIN — Medication 325 MILLIGRAM(S): at 12:02

## 2022-08-27 RX ADMIN — ALBUTEROL 2 PUFF(S): 90 AEROSOL, METERED ORAL at 13:20

## 2022-08-27 RX ADMIN — Medication 4: at 11:58

## 2022-08-27 RX ADMIN — BUDESONIDE AND FORMOTEROL FUMARATE DIHYDRATE 2 PUFF(S): 160; 4.5 AEROSOL RESPIRATORY (INHALATION) at 21:36

## 2022-08-27 RX ADMIN — BUDESONIDE AND FORMOTEROL FUMARATE DIHYDRATE 2 PUFF(S): 160; 4.5 AEROSOL RESPIRATORY (INHALATION) at 09:16

## 2022-08-27 RX ADMIN — Medication 10 MILLIGRAM(S): at 05:50

## 2022-08-27 RX ADMIN — PANTOPRAZOLE SODIUM 40 MILLIGRAM(S): 20 TABLET, DELAYED RELEASE ORAL at 05:45

## 2022-08-27 RX ADMIN — Medication 50 MILLIGRAM(S): at 05:50

## 2022-08-27 RX ADMIN — HEPARIN SODIUM 5000 UNIT(S): 5000 INJECTION INTRAVENOUS; SUBCUTANEOUS at 13:56

## 2022-08-27 RX ADMIN — ATORVASTATIN CALCIUM 10 MILLIGRAM(S): 80 TABLET, FILM COATED ORAL at 21:36

## 2022-08-27 RX ADMIN — HEPARIN SODIUM 5000 UNIT(S): 5000 INJECTION INTRAVENOUS; SUBCUTANEOUS at 21:35

## 2022-08-27 RX ADMIN — Medication 3 MILLILITER(S): at 15:04

## 2022-08-27 RX ADMIN — Medication 10 MILLIGRAM(S): at 14:00

## 2022-08-27 RX ADMIN — LIDOCAINE 1 PATCH: 4 CREAM TOPICAL at 13:53

## 2022-08-27 RX ADMIN — BUMETANIDE 1 MILLIGRAM(S): 0.25 INJECTION INTRAMUSCULAR; INTRAVENOUS at 05:46

## 2022-08-27 RX ADMIN — Medication 11 UNIT(S): at 16:49

## 2022-08-27 RX ADMIN — Medication 9 UNIT(S): at 11:59

## 2022-08-27 RX ADMIN — Medication 4: at 16:50

## 2022-08-27 RX ADMIN — Medication 9 UNIT(S): at 07:46

## 2022-08-27 RX ADMIN — Medication 3 MILLILITER(S): at 13:23

## 2022-08-27 RX ADMIN — Medication 3 MILLILITER(S): at 20:28

## 2022-08-27 NOTE — PROGRESS NOTE ADULT - SUBJECTIVE AND OBJECTIVE BOX
SUBJECTIVE / OVERNIGHT EVENTS:pt seen and examined    MEDICATIONS  (STANDING):  albuterol/ipratropium for Nebulization 3 milliLiter(s) Nebulizer every 6 hours  atorvastatin 10 milliGRAM(s) Oral at bedtime  budesonide 160 MICROgram(s)/formoterol 4.5 MICROgram(s) Inhaler 2 Puff(s) Inhalation two times a day  dextrose 5%. 1000 milliLiter(s) (100 mL/Hr) IV Continuous <Continuous>  dextrose 5%. 1000 milliLiter(s) (50 mL/Hr) IV Continuous <Continuous>  dextrose 50% Injectable 25 Gram(s) IV Push once  dextrose 50% Injectable 12.5 Gram(s) IV Push once  dextrose 50% Injectable 25 Gram(s) IV Push once  ferrous    sulfate 325 milliGRAM(s) Oral daily  glucagon  Injectable 1 milliGRAM(s) IntraMuscular once  heparin   Injectable 5000 Unit(s) SubCutaneous every 8 hours  hydrALAZINE 10 milliGRAM(s) Oral three times a day  insulin glargine Injectable (LANTUS) 35 Unit(s) SubCutaneous at bedtime  insulin lispro (ADMELOG) corrective regimen sliding scale   SubCutaneous three times a day before meals  insulin lispro (ADMELOG) corrective regimen sliding scale   SubCutaneous at bedtime  insulin lispro Injectable (ADMELOG) 11 Unit(s) SubCutaneous three times a day before meals  lidocaine   4% Patch 1 Patch Transdermal daily  metoprolol succinate ER 50 milliGRAM(s) Oral daily  pantoprazole    Tablet 40 milliGRAM(s) Oral before breakfast    MEDICATIONS  (PRN):  acetaminophen     Tablet .. 650 milliGRAM(s) Oral every 6 hours PRN Mild Pain (1 - 3), Moderate Pain (4 - 6), Severe Pain (7 - 10)  ALBUTerol    90 MICROgram(s) HFA Inhaler 2 Puff(s) Inhalation every 6 hours PRN Shortness of Breath and/or Wheezing  dextrose Oral Gel 15 Gram(s) Oral once PRN Blood Glucose LESS THAN 70 milliGRAM(s)/deciliter    Vital Signs Last 24 Hrs  T(C): 36.8 (22 @ 18:16), Max: 36.9 (22 @ 09:15)  T(F): 98.2 (22 @ 18:16), Max: 98.4 (22 @ 09:15)  HR: 72 (22 @ 20:28) (51 - 89)  BP: 131/69 (22 @ 18:16) (107/59 - 131/69)  BP(mean): --  RR: 18 (22 @ 18:16) (18 - 18)  SpO2: 98% (22 @ 20:28) (95% - 100%)        Constitutional: No fever, fatigue  Skin: No rash.  Eyes: No recent vision problems or eye pain.  ENT: No congestion, ear pain, or sore throat.  Cardiovascular: No chest pain or palpation.  Respiratory: No cough, shortness of breath, congestion, or wheezing.  Gastrointestinal: No abdominal pain, nausea, vomiting, or diarrhea.  Genitourinary: No dysuria.  Musculoskeletal: No joint swelling.  Neurologic: No headache.    PHYSICAL EXAM:  GENERAL: NAD  EYES: EOMI, PERRLA  NECK: Supple, No JVD  CHEST/LUNG: dec breath sounds at bases  HEART:  S1 , S2 +  ABDOMEN: soft, bs+  EXTREMITIES:  edema+  NEUROLOGY:alert awake oriented      LABS:      133<L>  |  93<L>  |  70<H>  ----------------------------<  301<H>  4.6   |  29  |  1.89<H>    Ca    9.8      27 Aug 2022 07:04  Phos  3.9       Mg     2.20           Creatinine Trend: 1.89 <--, 1.69 <--, 1.54 <--, 1.48 <--, 1.51 <--, 1.76 <--                        10.9   5.39  )-----------( 209      ( 27 Aug 2022 07:04 )             36.9     Urine Studies:  Urinalysis Basic - ( 24 Aug 2022 00:28 )    Color: Light Yellow / Appearance: Clear / S.021 / pH:   Gluc:  / Ketone: Negative  / Bili: Negative / Urobili: <2 mg/dL   Blood:  / Protein: Negative / Nitrite: Negative   Leuk Esterase: Negative / RBC: 1 /HPF / WBC 1 /HPF   Sq Epi:  / Non Sq Epi: 2 /HPF / Bacteria: Negative                            LIVER FUNCTIONS - ( 25 Aug 2022 06:35 )  Alb: 3.6 g/dL / Pro: 7.7 g/dL / ALK PHOS: 95 U/L / ALT: 13 U/L / AST: 16 U/L / GGT: x               Imaging Personally Reviewed:    Consultant(s) Notes Reviewed:  yes    Care Discussed with Consultants/Other Providers:yes

## 2022-08-27 NOTE — PROGRESS NOTE ADULT - SUBJECTIVE AND OBJECTIVE BOX
Chief Complaint: HHS, DM2 uncontrolled with hyperglycemia     History: Hyperglycemia persisting this AM, patient reports she does not feel well, still somewhat SOB and c/o fatigue. Reports she is eating about half of her meals.     MEDICATIONS  (STANDING):  albuterol/ipratropium for Nebulization 3 milliLiter(s) Nebulizer every 6 hours  atorvastatin 10 milliGRAM(s) Oral at bedtime  budesonide 160 MICROgram(s)/formoterol 4.5 MICROgram(s) Inhaler 2 Puff(s) Inhalation two times a day  buMETAnide 1 milliGRAM(s) Oral two times a day  dextrose 5%. 1000 milliLiter(s) (100 mL/Hr) IV Continuous <Continuous>  dextrose 5%. 1000 milliLiter(s) (50 mL/Hr) IV Continuous <Continuous>  dextrose 50% Injectable 25 Gram(s) IV Push once  dextrose 50% Injectable 12.5 Gram(s) IV Push once  dextrose 50% Injectable 25 Gram(s) IV Push once  ferrous    sulfate 325 milliGRAM(s) Oral daily  glucagon  Injectable 1 milliGRAM(s) IntraMuscular once  heparin   Injectable 5000 Unit(s) SubCutaneous every 8 hours  hydrALAZINE 10 milliGRAM(s) Oral three times a day  insulin glargine Injectable (LANTUS) 30 Unit(s) SubCutaneous at bedtime  insulin lispro (ADMELOG) corrective regimen sliding scale   SubCutaneous three times a day before meals  insulin lispro (ADMELOG) corrective regimen sliding scale   SubCutaneous at bedtime  insulin lispro Injectable (ADMELOG) 9 Unit(s) SubCutaneous three times a day before meals  metoprolol succinate ER 50 milliGRAM(s) Oral daily  pantoprazole    Tablet 40 milliGRAM(s) Oral before breakfast    MEDICATIONS  (PRN):  acetaminophen     Tablet .. 650 milliGRAM(s) Oral every 6 hours PRN Mild Pain (1 - 3), Moderate Pain (4 - 6), Severe Pain (7 - 10)  ALBUTerol    90 MICROgram(s) HFA Inhaler 2 Puff(s) Inhalation every 6 hours PRN Shortness of Breath and/or Wheezing  dextrose Oral Gel 15 Gram(s) Oral once PRN Blood Glucose LESS THAN 70 milliGRAM(s)/deciliter          No Known Allergies          Review of Systems:  Constitutional: No fever  Cardiovascular: No chest pain, palpitations  Respiratory: No SOB, no cough  GI: No nausea, vomiting, abdominal pain      PHYSICAL EXAM:  VITALS: T(C): 36.9 (08-27-22 @ 09:15)  T(F): 98.4 (08-27-22 @ 09:15), Max: 98.4 (08-27-22 @ 09:15)  HR: 72 (08-27-22 @ 09:15) (51 - 85)  BP: 107/59 (08-27-22 @ 09:15) (102/62 - 119/55)  RR:  (18 - 18)  SpO2:  (96% - 100%)  Wt(kg): --  GENERAL: NAD, laying in bed  RESPIRATORY: NC, non labored respirations   GI: Obese, Soft, nontender, non distended  NEURO: extraocular movements intact, no tremor  PSYCH: Alert and oriented x 3, normal affect, normal mood      CAPILLARY BLOOD GLUCOSE      POCT Blood Glucose.: 209 mg/dL (27 Aug 2022 11:51)  POCT Blood Glucose.: 302 mg/dL (27 Aug 2022 07:44)  POCT Blood Glucose.: 287 mg/dL (26 Aug 2022 21:39)  POCT Blood Glucose.: 259 mg/dL (26 Aug 2022 16:30)      08-27    133<L>  |  93<L>  |  70<H>  ----------------------------<  301<H>  4.6   |  29  |  1.89<H>    eGFR: 28<L>    Ca    9.8      08-27  Mg     2.20     08-27  Phos  3.9     08-27    TPro  7.7  /  Alb  3.6  /  TBili  0.4  /  DBili  x   /  AST  16  /  ALT  13  /  AlkPhos  95  08-25      Anion Gap, Serum: 11 mmol/L (08-27-22 @ 07:04)  Anion Gap, Serum: 10 mmol/L (08-26-22 @ 05:40)  Anion Gap, Serum: 7 mmol/L (08-25-22 @ 06:35)  Anion Gap, Serum: 8 mmol/L (08-24-22 @ 14:54)  Anion Gap, Serum: 10 mmol/L (08-24-22 @ 13:01)        A1C with Estimated Average Glucose Result: 10.7 % (08-23-22 @ 23:18)          Diet, Consistent Carbohydrate/No Snacks:   No Concentrated Potassium  Low Sodium  Supplement Feeding Modality:  Oral  Nepro Cans or Servings Per Day:  1       Frequency:  Three Times a day (08-26-22 @ 09:43)

## 2022-08-27 NOTE — PROGRESS NOTE ADULT - ASSESSMENT
Assessment and Plan:   · Assessment	    A/P: 73 y/o F w/ COPD on home O2(3L) with severe pHTN, OHS/MIGUEL on home biPAP, HTN, HLD admitted for hyperglycemia >600 with c/f HHS.  Endocrine consulted for management of hyperglycemia/new T2DM.  Patient does not meet full criteria for HHS (serum glc >600, plasma osmolality >320, AMS), and is responding adequately to insulin but remains with fasting hyperglycemia     1. Hyperglycemia/DM2  - Standing weight: 107kg   - Consistent carbohydrate diet  - Will increase Lantus to 35 units at bedtime and Admelog to 11 units premeal TID  - moderate dose Admelog ISS pre-meal, moderate dose Admelog ISS at bedtime    - Glucose Target 100 -180 mg/dl; Above goal  - hypoglycemia protocol in place   - Nutrition consult (completed)  - Provider to Rn: Insulin pen administration and glucometer teaching prior to discharge. Please send glucometer to [facility] and teach pt with her own glucometer prior to discharge.    DC planning  - ____ levemir at bedtime and ____ novolog premeal  - Discuss with patient the importance of Carb consistent diet and exercise as tolerated.    - Recommend nutritional consultation  inpt prior to dc (completed)  - Discuss glycemic goal of a1c <7% to prevent microvascular complications of diabetes mellitus and reduce the risk of macrovascular complications.  - Make sure patient knows how to inject insulin and check fingersticks with glucometer (ask bedside RN for teaching)  - Patient's  takes Levemir and Novolog at home and patient reports he will assist her  -  pt on kinetics and action of basal and prandial insulin.    Pt should call their doctor when FSBG <70 or above >400 and or consistently above 200s as changes in the regimen will have to be made.  -pt should call PMD for DM related questions or concerns until pt is seen by CDE or endocrine   -Recommend annual podiatry and ophthalmology follow up.       Patient has follow-up with [facility] Endocrinology    CARIN Mckeon 9/9 1:30 PM   Dr. Henderson 11/14 12:20 PM  [facility]  [address] [place] [address], [address], [place] [address]  [phone]   Please call to schedule appointment with CDE/Nutritionist and MD appointment next available   Ensure patient has working glucometer, test strips, lancets, alcohol pads, and BD sharan pen needles  Please also prescribe glucose tabs, Baqsimi nasal spray or glucagon emergency kit for hypoglycemia risk       2. Steroid use  - Patient intermittently using prednisone 10mg for SOB, last dose 2 days ago.  - BPs stable, steroid withdrawal symptoms of less concern  - Continues off prednisone at the present time   she is not on long  term steroids therefore risk of secondary AI is decreased       3. HTN  - on bumex 1mg bid, hydralazine 25mg tid, goal <130/80 and >100/60  - outpt mc/cr  - Defer management to primary team     4. HLD  - on atorvastatin 10mg qd  - LDL is not at goal of less than 70 (currently 110), would suggest increasing statin therapy if no contraindication  - Defer management to primary team     EDDY Ordaz-BC  Nurse Practitioner   Division of Endocrinology  Pager: NATALIA pager [phone]    If out of hospital/unavailable when paged, please note: patient will be cared for by another provider on the endocrine service.  Please call the endocrine answering service for assistance to reach covering provider ([phone]). For non-urgent matters, please email [email or web address] for assistance.                Assessment and Plan:   · Assessment	    A/P: 71 y/o F w/ COPD on home O2(3L) with severe pHTN, OHS/MIGUEL on home biPAP, HTN, HLD admitted for hyperglycemia >600 with c/f HHS.  Endocrine consulted for management of hyperglycemia/new T2DM.  Patient does not meet full criteria for HHS (serum glc >600, plasma osmolality >320, AMS), and is responding adequately to insulin but remains with fasting hyperglycemia     1. Hyperglycemia/DM2  - Standing weight: 107kg   - Consistent carbohydrate diet  - Will increase Lantus to 35 units at bedtime and Admelog to 11 units premeal TID  - moderate dose Admelog ISS pre-meal, moderate dose Admelog ISS at bedtime    - Glucose Target 100 -180 mg/dl; Above goal  - hypoglycemia protocol in place   - Nutrition consult (completed)  - Provider to Rn: Insulin pen administration and glucometer teaching prior to discharge. Please send glucometer to [facility] and teach pt with her own glucometer prior to discharge.    DC planning  - ____ levemir at bedtime and ____ novolog premeal  - Discuss with patient the importance of Carb consistent diet and exercise as tolerated.    - Recommend nutritional consultation  inpt prior to dc (completed)  - Discuss glycemic goal of a1c <7% to prevent microvascular complications of diabetes mellitus and reduce the risk of macrovascular complications.  - Make sure patient knows how to inject insulin and check fingersticks with glucometer (ask bedside RN for teaching)  - Patient's  takes Levemir and Novolog at home and patient reports he will assist her  -  pt on kinetics and action of basal and prandial insulin.    Pt should call their doctor when FSBG <70 or above >400 and or consistently above 200s as changes in the regimen will have to be made.  -pt should call PMD for DM related questions or concerns until pt is seen by CDE or endocrine   -Recommend annual podiatry and ophthalmology follow up.       Patient has follow-up with [facility] Endocrinology    CARIN Mckeon 9/9 1:30 PM   Dr. Henderson 11/14 12:20 PM  [facility]  [address] [place] [address], [address], [place] [address]  [phone]   Please call to schedule appointment with CDE/Nutritionist and MD appointment next available   Ensure patient has working glucometer, test strips, lancets, alcohol pads, and BD sharan pen needles  Please also prescribe glucose tabs, Baqsimi nasal spray or glucagon emergency kit for hypoglycemia risk       2. Steroid use  - Patient intermittently using prednisone 10mg for SOB, last dose 2 days ago.  - BPs stable, steroid withdrawal symptoms of less concern  - Continues off prednisone at the present time   she is not on long  term steroids therefore risk of secondary AI is decreased       3. HTN  - on bumex 1mg bid, hydralazine 25mg tid, goal <130/80 and >100/60  - outpt mc/cr  - Defer management to primary team     4. HLD  - on atorvastatin 10mg qd  - LDL is not at goal of less than 70 (currently 110), would suggest increasing statin therapy if no contraindication  - Defer management to primary team     EDDY Ordaz-BC  Nurse Practitioner   Division of Endocrinology  Pager: NATALIA pager [phone]    If out of hospital/unavailable when paged, please note: patient will be cared for by another provider on the endocrine service.  Please call the endocrine answering service for assistance to reach covering provider ([phone]). For non-urgent matters, please email [email or web address] for assistance.

## 2022-08-27 NOTE — PROGRESS NOTE ADULT - SUBJECTIVE AND OBJECTIVE BOX
[facility] NEPHROLOGY- PROGRESS NOTE    72 year old Female with history of COPD, CHF presents with weakness. Nephrology consulted for elevated Scr.    REVIEW OF SYSTEMS:  Gen: no fevers  Cards: no chest pain  Resp: + dyspnea  GI: no nausea or vomiting or diarrhea  Vascular: + LE edema    No Known Allergies      Hospital Medications: Medications reviewed      VITALS:  T(F): 98.4 (22 @ 09:15), Max: 98.4 (22 @ 09:15)  HR: 72 (22 @ 09:15)  BP: 107/59 (22 @ 09:15)  RR: 18 (22 @ 09:15)  SpO2: 99% (22 @ 09:15)  Wt(kg): --      PHYSICAL EXAM:    Gen: NAD, calm  Cards: Irregularly irregular, +S1/S2, no M/G/R  Resp: Poor airway entry bilaterally  GI: soft, NT/ND, NABS  Vascular: 1+ RLE edema only      LABS:      133<L>  |  93<L>  |  70<H>  ----------------------------<  301<H>  4.6   |  29  |  1.89<H>    Ca    9.8      27 Aug 2022 07:04  Phos  3.9       Mg     2.20           Creatinine Trend: 1.89 <--, 1.69 <--, 1.54 <--, 1.48 <--, 1.51 <--, 1.76 <--                        10.9   5.39  )-----------( 209      ( 27 Aug 2022 07:04 )             36.9     Urine Studies:  Urinalysis Basic - ( 24 Aug 2022 00:28 )    Color: Light Yellow / Appearance: Clear / S.021 / pH:   Gluc:  / Ketone: Negative  / Bili: Negative / Urobili: <2 mg/dL   Blood:  / Protein: Negative / Nitrite: Negative   Leuk Esterase: Negative / RBC: 1 /HPF / WBC 1 /HPF   Sq Epi:  / Non Sq Epi: 2 /HPF / Bacteria: Negative

## 2022-08-27 NOTE — PROGRESS NOTE ADULT - ASSESSMENT
72 year old Female with history of COPD, CHF presents with weakness. Nephrology consulted for elevated Scr.    1) MISAEL: likely due to pre-renal azotemia given hyperglycemia causing osmotic diuresis and loop diuretic use. Will hold bumex this evening. If Scr continues to increase, will need to check urine studies and imaging. Avoid nephrotoxins.    2) CKD-3b: Baseline Scr 1.4-1.6 with CKD likely due to DM. Outpatient CKD work up. Monitor electrolytes.    3) HTN with CKD: BP low normal. Continue with current medications. Monitor BP.    4) LE edema: Suspect dyspnea due to underlying COPD rather than HF. Hold bumex 1 mg PO twice daily this evening and will change to 2 mg PO daily on 8/28. Check CXR. Prior TTE with grossly normal LVSF. Monitor UO.    5) Metabolic alkalosis: Patient with mixed disorder (respiratory acidosis and metabolic alkalosis). No need for diamox. Monitor pH.    6) L renal mass: Patient refusing inpatient work up. Outpatient Urology evaluation.      [facility] NEPHROLOGY  Rodrigue Amador M.D.  Rob Perez D.O.  Ana Song M.D.  Lucrecia López, ALLISON, ANP-C    Telephone: [phone]  Facsimile: [phone]    [address] [place] [id number]

## 2022-08-28 LAB
ANION GAP SERPL CALC-SCNC: 12 MMOL/L — SIGNIFICANT CHANGE UP (ref 7–14)
ANION GAP SERPL CALC-SCNC: 8 MMOL/L — SIGNIFICANT CHANGE UP (ref 7–14)
BASE EXCESS BLDV CALC-SCNC: 12.2 MMOL/L — HIGH (ref -2–3)
BLOOD GAS VENOUS COMPREHENSIVE RESULT: SIGNIFICANT CHANGE UP
BUN SERPL-MCNC: 74 MG/DL — HIGH (ref 7–23)
BUN SERPL-MCNC: 75 MG/DL — HIGH (ref 7–23)
CALCIUM SERPL-MCNC: 10 MG/DL — SIGNIFICANT CHANGE UP (ref 8.4–10.5)
CALCIUM SERPL-MCNC: 9.8 MG/DL — SIGNIFICANT CHANGE UP (ref 8.4–10.5)
CHLORIDE BLDV-SCNC: 97 MMOL/L — SIGNIFICANT CHANGE UP (ref 96–108)
CHLORIDE SERPL-SCNC: 92 MMOL/L — LOW (ref 98–107)
CHLORIDE SERPL-SCNC: 93 MMOL/L — LOW (ref 98–107)
CO2 BLDV-SCNC: 44.5 MMOL/L — HIGH (ref 22–26)
CO2 SERPL-SCNC: 28 MMOL/L — SIGNIFICANT CHANGE UP (ref 22–31)
CO2 SERPL-SCNC: 33 MMOL/L — HIGH (ref 22–31)
CREAT SERPL-MCNC: 1.79 MG/DL — HIGH (ref 0.5–1.3)
CREAT SERPL-MCNC: 1.81 MG/DL — HIGH (ref 0.5–1.3)
EGFR: 29 ML/MIN/1.73M2 — LOW
EGFR: 29 ML/MIN/1.73M2 — LOW
GAS PNL BLDV: 131 MMOL/L — LOW (ref 136–145)
GLUCOSE BLDC GLUCOMTR-MCNC: 128 MG/DL — HIGH (ref 70–99)
GLUCOSE BLDC GLUCOMTR-MCNC: 196 MG/DL — HIGH (ref 70–99)
GLUCOSE BLDC GLUCOMTR-MCNC: 231 MG/DL — HIGH (ref 70–99)
GLUCOSE BLDC GLUCOMTR-MCNC: 291 MG/DL — HIGH (ref 70–99)
GLUCOSE BLDV-MCNC: 267 MG/DL — HIGH (ref 70–99)
GLUCOSE SERPL-MCNC: 233 MG/DL — HIGH (ref 70–99)
GLUCOSE SERPL-MCNC: 252 MG/DL — HIGH (ref 70–99)
HCO3 BLDV-SCNC: 42 MMOL/L — HIGH (ref 22–29)
HCT VFR BLD CALC: 38.2 % — SIGNIFICANT CHANGE UP (ref 34.5–45)
HCT VFR BLDA CALC: 33 % — LOW (ref 34.5–46.5)
HGB BLD CALC-MCNC: 11 G/DL — LOW (ref 11.5–15.5)
HGB BLD-MCNC: 10.6 G/DL — LOW (ref 11.5–15.5)
LACTATE BLDV-MCNC: 1.4 MMOL/L — SIGNIFICANT CHANGE UP (ref 0.5–2)
MAGNESIUM SERPL-MCNC: 2.3 MG/DL — SIGNIFICANT CHANGE UP (ref 1.6–2.6)
MAGNESIUM SERPL-MCNC: 2.3 MG/DL — SIGNIFICANT CHANGE UP (ref 1.6–2.6)
MCHC RBC-ENTMCNC: 27.7 GM/DL — LOW (ref 32–36)
MCHC RBC-ENTMCNC: 28.5 PG — SIGNIFICANT CHANGE UP (ref 27–34)
MCV RBC AUTO: 102.7 FL — HIGH (ref 80–100)
NRBC # BLD: 0 /100 WBCS — SIGNIFICANT CHANGE UP (ref 0–0)
NRBC # FLD: 0 K/UL — SIGNIFICANT CHANGE UP (ref 0–0)
PCO2 BLDV: 87 MMHG — HIGH (ref 39–42)
PH BLDV: 7.29 — LOW (ref 7.32–7.43)
PHOSPHATE SERPL-MCNC: 3.1 MG/DL — SIGNIFICANT CHANGE UP (ref 2.5–4.5)
PHOSPHATE SERPL-MCNC: 4 MG/DL — SIGNIFICANT CHANGE UP (ref 2.5–4.5)
PLATELET # BLD AUTO: 207 K/UL — SIGNIFICANT CHANGE UP (ref 150–400)
PO2 BLDV: 36 MMHG — SIGNIFICANT CHANGE UP
POTASSIUM BLDV-SCNC: 5 MMOL/L — SIGNIFICANT CHANGE UP (ref 3.5–5.1)
POTASSIUM SERPL-MCNC: 4.8 MMOL/L — SIGNIFICANT CHANGE UP (ref 3.5–5.3)
POTASSIUM SERPL-MCNC: 5.1 MMOL/L — SIGNIFICANT CHANGE UP (ref 3.5–5.3)
POTASSIUM SERPL-SCNC: 4.8 MMOL/L — SIGNIFICANT CHANGE UP (ref 3.5–5.3)
POTASSIUM SERPL-SCNC: 5.1 MMOL/L — SIGNIFICANT CHANGE UP (ref 3.5–5.3)
RBC # BLD: 3.72 M/UL — LOW (ref 3.8–5.2)
RBC # FLD: 13.2 % — SIGNIFICANT CHANGE UP (ref 10.3–14.5)
SAO2 % BLDV: 54 % — SIGNIFICANT CHANGE UP
SODIUM SERPL-SCNC: 133 MMOL/L — LOW (ref 135–145)
SODIUM SERPL-SCNC: 133 MMOL/L — LOW (ref 135–145)
WBC # BLD: 6.11 K/UL — SIGNIFICANT CHANGE UP (ref 3.8–10.5)
WBC # FLD AUTO: 6.11 K/UL — SIGNIFICANT CHANGE UP (ref 3.8–10.5)

## 2022-08-28 PROCEDURE — 99232 SBSQ HOSP IP/OBS MODERATE 35: CPT

## 2022-08-28 PROCEDURE — 99223 1ST HOSP IP/OBS HIGH 75: CPT

## 2022-08-28 RX ORDER — BUMETANIDE 0.25 MG/ML
2 INJECTION INTRAMUSCULAR; INTRAVENOUS DAILY
Refills: 0 | Status: DISCONTINUED | OUTPATIENT
Start: 2022-08-28 | End: 2022-08-28

## 2022-08-28 RX ORDER — INSULIN LISPRO 100/ML
13 VIAL (ML) SUBCUTANEOUS
Refills: 0 | Status: DISCONTINUED | OUTPATIENT
Start: 2022-08-28 | End: 2022-08-29

## 2022-08-28 RX ORDER — INSULIN GLARGINE 100 [IU]/ML
40 INJECTION, SOLUTION SUBCUTANEOUS AT BEDTIME
Refills: 0 | Status: DISCONTINUED | OUTPATIENT
Start: 2022-08-28 | End: 2022-08-30

## 2022-08-28 RX ORDER — LEVALBUTEROL 1.25 MG/.5ML
0.63 SOLUTION, CONCENTRATE RESPIRATORY (INHALATION) EVERY 6 HOURS
Refills: 0 | Status: DISCONTINUED | OUTPATIENT
Start: 2022-08-28 | End: 2022-09-07

## 2022-08-28 RX ORDER — BUMETANIDE 0.25 MG/ML
2 INJECTION INTRAMUSCULAR; INTRAVENOUS DAILY
Refills: 0 | Status: DISCONTINUED | OUTPATIENT
Start: 2022-08-29 | End: 2022-08-30

## 2022-08-28 RX ADMIN — LIDOCAINE 1 PATCH: 4 CREAM TOPICAL at 11:20

## 2022-08-28 RX ADMIN — HEPARIN SODIUM 5000 UNIT(S): 5000 INJECTION INTRAVENOUS; SUBCUTANEOUS at 05:42

## 2022-08-28 RX ADMIN — Medication 325 MILLIGRAM(S): at 11:20

## 2022-08-28 RX ADMIN — ATORVASTATIN CALCIUM 10 MILLIGRAM(S): 80 TABLET, FILM COATED ORAL at 21:15

## 2022-08-28 RX ADMIN — Medication 650 MILLIGRAM(S): at 17:03

## 2022-08-28 RX ADMIN — Medication 6: at 07:53

## 2022-08-28 RX ADMIN — Medication 50 MILLIGRAM(S): at 05:42

## 2022-08-28 RX ADMIN — Medication 11 UNIT(S): at 07:52

## 2022-08-28 RX ADMIN — Medication 11 UNIT(S): at 11:48

## 2022-08-28 RX ADMIN — PANTOPRAZOLE SODIUM 40 MILLIGRAM(S): 20 TABLET, DELAYED RELEASE ORAL at 05:42

## 2022-08-28 RX ADMIN — Medication 13 UNIT(S): at 16:58

## 2022-08-28 RX ADMIN — Medication 10 MILLIGRAM(S): at 21:14

## 2022-08-28 RX ADMIN — BUMETANIDE 2 MILLIGRAM(S): 0.25 INJECTION INTRAMUSCULAR; INTRAVENOUS at 05:42

## 2022-08-28 RX ADMIN — LEVALBUTEROL 0.63 MILLIGRAM(S): 1.25 SOLUTION, CONCENTRATE RESPIRATORY (INHALATION) at 11:50

## 2022-08-28 RX ADMIN — Medication 650 MILLIGRAM(S): at 18:15

## 2022-08-28 RX ADMIN — BUDESONIDE AND FORMOTEROL FUMARATE DIHYDRATE 2 PUFF(S): 160; 4.5 AEROSOL RESPIRATORY (INHALATION) at 21:33

## 2022-08-28 RX ADMIN — Medication 4: at 11:47

## 2022-08-28 RX ADMIN — Medication 10 MILLIGRAM(S): at 13:17

## 2022-08-28 RX ADMIN — HEPARIN SODIUM 5000 UNIT(S): 5000 INJECTION INTRAVENOUS; SUBCUTANEOUS at 21:15

## 2022-08-28 RX ADMIN — INSULIN GLARGINE 40 UNIT(S): 100 INJECTION, SOLUTION SUBCUTANEOUS at 22:02

## 2022-08-28 RX ADMIN — LIDOCAINE 1 PATCH: 4 CREAM TOPICAL at 02:35

## 2022-08-28 RX ADMIN — LEVALBUTEROL 0.63 MILLIGRAM(S): 1.25 SOLUTION, CONCENTRATE RESPIRATORY (INHALATION) at 15:33

## 2022-08-28 RX ADMIN — LEVALBUTEROL 0.63 MILLIGRAM(S): 1.25 SOLUTION, CONCENTRATE RESPIRATORY (INHALATION) at 21:22

## 2022-08-28 RX ADMIN — Medication 10 MILLIGRAM(S): at 05:46

## 2022-08-28 RX ADMIN — HEPARIN SODIUM 5000 UNIT(S): 5000 INJECTION INTRAVENOUS; SUBCUTANEOUS at 13:17

## 2022-08-28 RX ADMIN — BUDESONIDE AND FORMOTEROL FUMARATE DIHYDRATE 2 PUFF(S): 160; 4.5 AEROSOL RESPIRATORY (INHALATION) at 10:18

## 2022-08-28 NOTE — PROGRESS NOTE ADULT - SUBJECTIVE AND OBJECTIVE BOX
PULMONARY PROGRESS NOTE    DAMARI GUERRERO  MRN-[id number]    Patient is a 72y old  Female who presents with a chief complaint of lightheadedness and fatigue for 5 days (27 Aug 2022 13:01)      HPI:  -was sob/tight yesterday after sugars were elevated  -feels better today  -using NIV at home every night    ROS:   -    ACTIVE MEDICATION LIST:  MEDICATIONS  (STANDING):  atorvastatin 10 milliGRAM(s) Oral at bedtime  budesonide 160 MICROgram(s)/formoterol 4.5 MICROgram(s) Inhaler 2 Puff(s) Inhalation two times a day  buMETAnide 2 milliGRAM(s) Oral daily  dextrose 5%. 1000 milliLiter(s) (100 mL/Hr) IV Continuous <Continuous>  dextrose 5%. 1000 milliLiter(s) (50 mL/Hr) IV Continuous <Continuous>  dextrose 50% Injectable 25 Gram(s) IV Push once  dextrose 50% Injectable 12.5 Gram(s) IV Push once  dextrose 50% Injectable 25 Gram(s) IV Push once  ferrous    sulfate 325 milliGRAM(s) Oral daily  glucagon  Injectable 1 milliGRAM(s) IntraMuscular once  heparin   Injectable 5000 Unit(s) SubCutaneous every 8 hours  hydrALAZINE 10 milliGRAM(s) Oral three times a day  insulin glargine Injectable (LANTUS) 35 Unit(s) SubCutaneous at bedtime  insulin lispro (ADMELOG) corrective regimen sliding scale   SubCutaneous three times a day before meals  insulin lispro (ADMELOG) corrective regimen sliding scale   SubCutaneous at bedtime  insulin lispro Injectable (ADMELOG) 11 Unit(s) SubCutaneous three times a day before meals  levalbuterol Inhalation 0.63 milliGRAM(s) Inhalation every 6 hours  lidocaine   4% Patch 1 Patch Transdermal daily  metoprolol succinate ER 50 milliGRAM(s) Oral daily  pantoprazole    Tablet 40 milliGRAM(s) Oral before breakfast    MEDICATIONS  (PRN):  acetaminophen     Tablet .. 650 milliGRAM(s) Oral every 6 hours PRN Mild Pain (1 - 3), Moderate Pain (4 - 6), Severe Pain (7 - 10)  ALBUTerol    90 MICROgram(s) HFA Inhaler 2 Puff(s) Inhalation every 6 hours PRN Shortness of Breath and/or Wheezing  dextrose Oral Gel 15 Gram(s) Oral once PRN Blood Glucose LESS THAN 70 milliGRAM(s)/deciliter      EXAM:  Vital Signs Last 24 Hrs  T(C): 36.7 (28 Aug 2022 05:30), Max: 36.8 (27 Aug 2022 18:16)  T(F): 98.1 (28 Aug 2022 05:30), Max: 98.2 (27 Aug 2022 18:16)  HR: 76 (28 Aug 2022 08:36) (66 - 89)  BP: 126/63 (28 Aug 2022 05:30) (116/61 - 131/69)  BP(mean): --  RR: 18 (28 Aug 2022 05:30) (18 - 18)  SpO2: 97% (28 Aug 2022 08:36) (95% - 100%)    Parameters below as of 28 Aug 2022 05:30  Patient On (Oxygen Delivery Method): nasal cannula  O2 Flow (L/min): 3      GENERAL: The patient is awake and alert in no apparent distress.     LUNGS: Clear to auscultation without wheezing, rales or rhonchi; respirations unlabored        LABS/IMAGING: reviewed                        10.6   6.11  )-----------( 207      ( 28 Aug 2022 05:20 )             38.2     08-28    133<L>  |  93<L>  |  75<H>  ----------------------------<  252<H>  5.1   |  28  |  1.81<H>    Ca    9.8      28 Aug 2022 05:20  Phos  4.0     08-28  Mg     2.30     08-28    CXR appears slightly more volume overloaded than prior      PROBLEM LIST:  72y Female with HEALTH ISSUES - PROBLEM Dx:  Hyperglycemia due to diabetes mellitus    Chronic diastolic heart failure    MIGUEL treated with BiPAP    COPD, severe    Chronic atrial fibrillation    HLD (hyperlipidemia)    Diabetes mellitus, new onset    Hypertension    Acute kidney injury superimposed on CKD      RECS:  -Cont symbicort and nebs, if tight or wheezing again start solumedrol 40mg IV Q8  -diuresis  -NIV during sleep  -O2  -incentive jovanny    Em Merida MD   [phone]

## 2022-08-28 NOTE — CHART NOTE - NSCHARTNOTEFT_GEN_A_CORE
[facility] NIGHT MEDICINE COVERAGE    Notified by RN that pt had episode of VT on tele while she was transferring to the commode.  Pt asymptomatic and VSS.  Tele reviewed, showing artifact vs. polymorphic VT for ~22-23 beats, pt in sinus rhythm presently.  Pt assessed at bedside, she offers no complaints, denies chest pain, difficulty breathing, or palpitations.  Pt alert, in NAD, has BIPAP on, no increased work of breathing noted.    Vital Signs Last 24 Hrs  T(C): 36.6 (27 Aug 2022 21:30), Max: 36.9 (27 Aug 2022 09:15)  T(F): 97.8 (27 Aug 2022 21:30), Max: 98.4 (27 Aug 2022 09:15)  HR: 78 (28 Aug 2022 00:15) (53 - 89)  BP: 116/61 (27 Aug 2022 21:30) (107/59 - 131/69)  RR: 18 (27 Aug 2022 21:30) (18 - 18)  SpO2: 100% (28 Aug 2022 00:15) (95% - 100%)    O2 Parameters below as of 27 Aug 2022 21:30  Patient On (Oxygen Delivery Method): nasal cannula  O2 Flow (L/min): 3    Plan:  -Given h/o heart failure, discontinued standing Duonebs, starting pt on standing Xopenex nebulizer (d/w pharmacy to get order placed) - will reduce chance of tachycardia or arrhythmia.  -Check AM labs now to evaluate serum magnesium, will replete PRN  -C/w telemtery  -Pt pending pulmonology c/s in AM, will check VBG.    Plan d/w RN and pt, all questions answered.  Pt stable at this time, will continue to monitor.    Thee Sparks PA-C  Department of Medicine - [facility]  In-House Pager: #[phone]

## 2022-08-28 NOTE — PROGRESS NOTE ADULT - SUBJECTIVE AND OBJECTIVE BOX
Chief Complaint: HHS, DM2 uncontrolled with hyperglycemia     History: Hyperglycemia persisting this AM. Reports she is still eating about half of her meals.     MEDICATIONS  (STANDING):  atorvastatin 10 milliGRAM(s) Oral at bedtime  budesonide 160 MICROgram(s)/formoterol 4.5 MICROgram(s) Inhaler 2 Puff(s) Inhalation two times a day  dextrose 5%. 1000 milliLiter(s) (100 mL/Hr) IV Continuous <Continuous>  dextrose 5%. 1000 milliLiter(s) (50 mL/Hr) IV Continuous <Continuous>  dextrose 50% Injectable 25 Gram(s) IV Push once  dextrose 50% Injectable 12.5 Gram(s) IV Push once  dextrose 50% Injectable 25 Gram(s) IV Push once  ferrous    sulfate 325 milliGRAM(s) Oral daily  glucagon  Injectable 1 milliGRAM(s) IntraMuscular once  heparin   Injectable 5000 Unit(s) SubCutaneous every 8 hours  hydrALAZINE 10 milliGRAM(s) Oral three times a day  insulin glargine Injectable (LANTUS) 35 Unit(s) SubCutaneous at bedtime  insulin lispro (ADMELOG) corrective regimen sliding scale   SubCutaneous three times a day before meals  insulin lispro (ADMELOG) corrective regimen sliding scale   SubCutaneous at bedtime  insulin lispro Injectable (ADMELOG) 11 Unit(s) SubCutaneous three times a day before meals  levalbuterol Inhalation 0.63 milliGRAM(s) Inhalation every 6 hours  lidocaine   4% Patch 1 Patch Transdermal daily  metoprolol succinate ER 50 milliGRAM(s) Oral daily  pantoprazole    Tablet 40 milliGRAM(s) Oral before breakfast          No Known Allergies          Review of Systems:  Constitutional: No fever  Cardiovascular: No chest pain, palpitations  Respiratory: No SOB, no cough  GI: No nausea, vomiting, abdominal pain      PHYSICAL EXAM:  Vital Signs Last 24 Hrs  T(C): 36.7 (28 Aug 2022 05:30), Max: 36.8 (27 Aug 2022 18:16)  T(F): 98.1 (28 Aug 2022 05:30), Max: 98.2 (27 Aug 2022 18:16)  HR: 76 (28 Aug 2022 08:36) (66 - 89)  BP: 126/63 (28 Aug 2022 05:30) (116/61 - 131/69)  BP(mean): --  RR: 18 (28 Aug 2022 05:30) (18 - 18)  SpO2: 97% (28 Aug 2022 08:36) (95% - 100%)    Parameters below as of 28 Aug 2022 05:30  Patient On (Oxygen Delivery Method): nasal cannula  O2 Flow (L/min): 3    GENERAL: NAD, laying in bed  RESPIRATORY: NC, non labored respirations   GI: Obese, Soft, nontender, non distended  NEURO: extraocular movements intact, no tremor  PSYCH: Alert and oriented x 3, normal affect, normal mood      CAPILLARY BLOOD GLUCOSE    POCT Blood Glucose.: 231 mg/dL (28 Aug 2022 11:42)  POCT Blood Glucose.: 291 mg/dL (28 Aug 2022 07:36)  POCT Blood Glucose.: 224 mg/dL (27 Aug 2022 21:30)  POCT Blood Glucose.: 229 mg/dL (27 Aug 2022 16:44)  POCT Blood Glucose.: 209 mg/dL (27 Aug 2022 11:51)  POCT Blood Glucose.: 302 mg/dL (27 Aug 2022 07:44)  POCT Blood Glucose.: 287 mg/dL (26 Aug 2022 21:39)  POCT Blood Glucose.: 259 mg/dL (26 Aug 2022 16:30)      08-28    133<L>  |  93<L>  |  75<H>  ----------------------------<  252<H>  5.1   |  28  |  1.81<H>    Ca    9.8      28 Aug 2022 05:20  Phos  4.0     08-28  Mg     2.30     08-28    Anion Gap, Serum: 12 mmol/L (08-28-22 @ 05:20)  Anion Gap, Serum: 11 mmol/L (08-27-22 @ 07:04)  Anion Gap, Serum: 10 mmol/L (08-26-22 @ 05:40)      A1C with Estimated Average Glucose Result: 10.7 % (08-23-22 @ 23:18)    Diet, Consistent Carbohydrate/No Snacks:   No Concentrated Potassium  Low Sodium  Supplement Feeding Modality:  Oral  Nepro Cans or Servings Per Day:  1       Frequency:  Three Times a day (08-26-22 @ 09:43) [Active]

## 2022-08-28 NOTE — PROGRESS NOTE ADULT - ASSESSMENT
72 year old Female with history of COPD, CHF presents with weakness. Nephrology consulted for elevated Scr.    1) MISAEL: mild and in setting of hyperglycemia causing osmotic diuresis? Scr stable. Will need to check urine studies and bladder scan if Scr continues to increase. Avoid nephrotoxins.    2) CKD-3b: Baseline Scr 1.4-1.6 with CKD likely due to DM. Outpatient CKD work up. Monitor electrolytes.    3) HTN with CKD: BP controlled. Continue with current medications. Monitor BP.    4) LE edema: Change bumex to 2 mg IV daily given congestion on CXR on 8/27. Repeat CXR in AM. Prior TTE with grossly normal LVSF. Monitor UO.    5) L renal mass: Patient refusing inpatient work up. Outpatient Urology evaluation.      [facility] NEPHROLOGY  Rodrigue Amador M.D.  Rob Perez D.O.  Ana Song M.D.  Lucrceia López, MSN, ANP-C    Telephone: [phone]  Facsimile: [phone]    [address] [place] [address]

## 2022-08-28 NOTE — PROGRESS NOTE ADULT - ASSESSMENT
Assessment and Plan:   · Assessment	    A/P: 71 y/o F w/ COPD on home O2(3L) with severe pHTN, OHS/MIGUEL on home biPAP, HTN, HLD admitted for hyperglycemia >600 with c/f HHS.  Endocrine consulted for management of hyperglycemia/new T2DM.  Patient does not meet full criteria for HHS (serum glc >600, plasma osmolality >320, AMS), and is responding adequately to insulin but remains with fasting hyperglycemia     1. Hyperglycemia/DM2  - Standing weight: 107kg   - Consistent carbohydrate diet  - Hyperglycemia persisting, total daily dose of insulin over last 24 hours: 80 units  - Will increase Lantus to 40 units at bedtime and Admelog to 13 units premeal TID  - moderate dose Admelog ISS pre-meal, moderate dose Admelog ISS at bedtime  - Glucose Target 100 -180 mg/dl; Above goal  - hypoglycemia protocol in place   - Nutrition consult (completed)  - Provider to RN: Insulin pen administration and glucometer teaching prior to discharge. Please send glucometer to vivo and teach pt with her own glucometer prior to discharge.    DC planning  - ____ levemir at bedtime and ____ novolog premeal (PENS)  - Discuss with patient the importance of Carb consistent diet and exercise as tolerated.    - Recommend nutritional consultation  inpt prior to dc (completed)  - Discuss glycemic goal of a1c <7% to prevent microvascular complications of diabetes mellitus and reduce the risk of macrovascular complications.  - Make sure patient knows how to inject insulin and check fingersticks with glucometer (ask bedside RN for teaching)  - Patient's  takes Levemir and Novolog at home and patient reports he will assist her  -  pt on kinetics and action of basal and prandial insulin.    Pt should call their doctor when FSBG <70 or above >400 and or consistently above 200s as changes in the regimen will have to be made.  -Pt should call PMD for DM related questions or concerns until pt is seen by CDE or endocrine   -Recommend annual podiatry and ophthalmology follow up.       Patient has follow-up with [facility] Endocrinology    NP Linda 9/9 1:30 PM   Dr. Henderson 11/14 12:20 PM  [facility]  [address] [place] [address], [address], [place] [address]  [phone]   Please call to schedule appointment with CDE/Nutritionist and MD appointment next available   Ensure patient has working glucometer, test strips, lancets, alcohol pads, and BD sharan pen needles  Please also prescribe glucose tabs, Baqsimi nasal spray or glucagon emergency kit for hypoglycemia risk       2. Steroid use  - Patient intermittently using prednisone 10mg for SOB, last dose 2 days ago.  - BPs stable, steroid withdrawal symptoms of less concern  - Continues off prednisone at the present time   she is not on long  term steroids therefore risk of secondary AI is decreased       3. HTN  - on bumex 1mg bid, hydralazine 25mg tid, goal <130/80 and >100/60  - outpt mc/cr  - Defer management to primary team     4. HLD  - on atorvastatin 10mg qd  - LDL is not at goal of less than 70 (currently 110), would suggest increasing statin therapy if no contraindication  - Defer management to primary team     EDDY Ordaz-BC  Nurse Practitioner   Division of Endocrinology  Pager: NATALIA pager [phone]    If out of hospital/unavailable when paged, please note: patient will be cared for by another provider on the endocrine service.  Please call the endocrine answering service for assistance to reach covering provider ([phone]). For non-urgent matters, please email [email or web address] for assistance.

## 2022-08-28 NOTE — PROGRESS NOTE ADULT - SUBJECTIVE AND OBJECTIVE BOX
[facility] NEPHROLOGY- PROGRESS NOTE    72 year old Female with history of COPD, CHF presents with weakness. Nephrology consulted for elevated Scr.    REVIEW OF SYSTEMS:  Gen: no fevers  Cards: no chest pain  Resp: + dyspnea improving  GI: no nausea or vomiting or diarrhea  Vascular: + LE edema improving    No Known Allergies      Hospital Medications: Medications reviewed      VITALS:  T(F): 98.1 (22 @ 05:30), Max: 98.2 (22 @ 18:16)  HR: 76 (22 @ 08:36)  BP: 126/63 (22 @ 05:30)  RR: 18 (22 @ 05:30)  SpO2: 97% (22 @ 08:36)  Wt(kg): --      PHYSICAL EXAM:    Gen: NAD, calm  Cards: Irregularly irregular, +S1/S2, no M/G/R  Resp: Poor airway entry bilaterally improving  GI: soft, NT/ND, NABS  Vascular: trace RLE edema only      LABS:      133<L>  |  93<L>  |  75<H>  ----------------------------<  252<H>  5.1   |  28  |  1.81<H>    Ca    9.8      28 Aug 2022 05:20  Phos  4.0       Mg     2.30           Creatinine Trend: 1.81 <--, 1.89 <--, 1.69 <--, 1.54 <--, 1.48 <--, 1.51 <--, 1.76 <--                        10.6   6.11  )-----------( 207      ( 28 Aug 2022 05:20 )             38.2     Urine Studies:  Urinalysis Basic - ( 24 Aug 2022 00:28 )    Color: Light Yellow / Appearance: Clear / S.021 / pH:   Gluc:  / Ketone: Negative  / Bili: Negative / Urobili: <2 mg/dL   Blood:  / Protein: Negative / Nitrite: Negative   Leuk Esterase: Negative / RBC: 1 /HPF / WBC 1 /HPF   Sq Epi:  / Non Sq Epi: 2 /HPF / Bacteria: Negative

## 2022-08-28 NOTE — PROGRESS NOTE ADULT - SUBJECTIVE AND OBJECTIVE BOX
SUBJECTIVE / OVERNIGHT EVENTS:pt seen and examined    MEDICATIONS  (STANDING):  atorvastatin 10 milliGRAM(s) Oral at bedtime  budesonide 160 MICROgram(s)/formoterol 4.5 MICROgram(s) Inhaler 2 Puff(s) Inhalation two times a day  dextrose 5%. 1000 milliLiter(s) (100 mL/Hr) IV Continuous <Continuous>  dextrose 5%. 1000 milliLiter(s) (50 mL/Hr) IV Continuous <Continuous>  dextrose 50% Injectable 25 Gram(s) IV Push once  dextrose 50% Injectable 12.5 Gram(s) IV Push once  dextrose 50% Injectable 25 Gram(s) IV Push once  ferrous    sulfate 325 milliGRAM(s) Oral daily  glucagon  Injectable 1 milliGRAM(s) IntraMuscular once  heparin   Injectable 5000 Unit(s) SubCutaneous every 8 hours  hydrALAZINE 10 milliGRAM(s) Oral three times a day  insulin glargine Injectable (LANTUS) 40 Unit(s) SubCutaneous at bedtime  insulin lispro (ADMELOG) corrective regimen sliding scale   SubCutaneous three times a day before meals  insulin lispro (ADMELOG) corrective regimen sliding scale   SubCutaneous at bedtime  insulin lispro Injectable (ADMELOG) 13 Unit(s) SubCutaneous three times a day before meals  levalbuterol Inhalation 0.63 milliGRAM(s) Inhalation every 6 hours  lidocaine   4% Patch 1 Patch Transdermal daily  metoprolol succinate ER 50 milliGRAM(s) Oral daily  pantoprazole    Tablet 40 milliGRAM(s) Oral before breakfast    MEDICATIONS  (PRN):  acetaminophen     Tablet .. 650 milliGRAM(s) Oral every 6 hours PRN Mild Pain (1 - 3), Moderate Pain (4 - 6), Severe Pain (7 - 10)  ALBUTerol    90 MICROgram(s) HFA Inhaler 2 Puff(s) Inhalation every 6 hours PRN Shortness of Breath and/or Wheezing  dextrose Oral Gel 15 Gram(s) Oral once PRN Blood Glucose LESS THAN 70 milliGRAM(s)/deciliter    Vital Signs Last 24 Hrs  T(C): 36.8 (22 @ 21:12), Max: 36.8 (22 @ 13:00)  T(F): 98.2 (22 @ 21:12), Max: 98.2 (22 @ 13:00)  HR: 78 (22 @ 21:47) (67 - 89)  BP: 110/56 (22 @ 21:12) (106/73 - 126/72)  BP(mean): --  RR: 18 (22 @ 21:12) (18 - 18)  SpO2: 97% (22 @ 21:47) (96% - 100%)        Constitutional: No fever, fatigue  Skin: No rash.  Eyes: No recent vision problems or eye pain.  ENT: No congestion, ear pain, or sore throat.  Cardiovascular: No chest pain or palpation.  Respiratory: No cough, shortness of breath, congestion, or wheezing.  Gastrointestinal: No abdominal pain, nausea, vomiting, or diarrhea.  Genitourinary: No dysuria.  Musculoskeletal: No joint swelling.  Neurologic: No headache.    PHYSICAL EXAM:  GENERAL: NAD  EYES: EOMI, PERRLA  NECK: Supple, No JVD  CHEST/LUNG: dec breath sounds at bases  HEART:  S1 , S2 +  ABDOMEN: soft, bs+  EXTREMITIES:  edema+  NEUROLOGY:alert awake oriented      LABS:      133<L>  |  92<L>  |  74<H>  ----------------------------<  233<H>  4.8   |  33<H>  |  1.79<H>    Ca    10.0      28 Aug 2022 12:30  Phos  3.1       Mg     2.30           Creatinine Trend: 1.79 <--, 1.81 <--, 1.89 <--, 1.69 <--, 1.54 <--, 1.48 <--, 1.51 <--, 1.76 <--                        10.6   6.11  )-----------( 207      ( 28 Aug 2022 05:20 )             38.2     Urine Studies:  Urinalysis Basic - ( 24 Aug 2022 00:28 )    Color: Light Yellow / Appearance: Clear / S.021 / pH:   Gluc:  / Ketone: Negative  / Bili: Negative / Urobili: <2 mg/dL   Blood:  / Protein: Negative / Nitrite: Negative   Leuk Esterase: Negative / RBC: 1 /HPF / WBC 1 /HPF   Sq Epi:  / Non Sq Epi: 2 /HPF / Bacteria: Negative                                      LIVER FUNCTIONS - ( 25 Aug 2022 06:35 )  Alb: 3.6 g/dL / Pro: 7.7 g/dL / ALK PHOS: 95 U/L / ALT: 13 U/L / AST: 16 U/L / GGT: x               Imaging Personally Reviewed:    Consultant(s) Notes Reviewed:  yes    Care Discussed with Consultants/Other Providers:yes

## 2022-08-29 LAB
ANION GAP SERPL CALC-SCNC: 9 MMOL/L — SIGNIFICANT CHANGE UP (ref 7–14)
APPEARANCE UR: CLEAR — SIGNIFICANT CHANGE UP
BILIRUB UR-MCNC: NEGATIVE — SIGNIFICANT CHANGE UP
BUN SERPL-MCNC: 84 MG/DL — HIGH (ref 7–23)
CALCIUM SERPL-MCNC: 9.9 MG/DL — SIGNIFICANT CHANGE UP (ref 8.4–10.5)
CHLORIDE SERPL-SCNC: 93 MMOL/L — LOW (ref 98–107)
CO2 SERPL-SCNC: 31 MMOL/L — SIGNIFICANT CHANGE UP (ref 22–31)
COLOR SPEC: SIGNIFICANT CHANGE UP
CREAT ?TM UR-MCNC: 44 MG/DL — SIGNIFICANT CHANGE UP
CREAT SERPL-MCNC: 1.85 MG/DL — HIGH (ref 0.5–1.3)
DIFF PNL FLD: NEGATIVE — SIGNIFICANT CHANGE UP
EGFR: 28 ML/MIN/1.73M2 — LOW
GLUCOSE BLDC GLUCOMTR-MCNC: 138 MG/DL — HIGH (ref 70–99)
GLUCOSE BLDC GLUCOMTR-MCNC: 193 MG/DL — HIGH (ref 70–99)
GLUCOSE BLDC GLUCOMTR-MCNC: 229 MG/DL — HIGH (ref 70–99)
GLUCOSE BLDC GLUCOMTR-MCNC: 358 MG/DL — HIGH (ref 70–99)
GLUCOSE SERPL-MCNC: 194 MG/DL — HIGH (ref 70–99)
GLUCOSE UR QL: NEGATIVE — SIGNIFICANT CHANGE UP
HCT VFR BLD CALC: 34.7 % — SIGNIFICANT CHANGE UP (ref 34.5–45)
HGB BLD-MCNC: 10.2 G/DL — LOW (ref 11.5–15.5)
KETONES UR-MCNC: NEGATIVE — SIGNIFICANT CHANGE UP
LEUKOCYTE ESTERASE UR-ACNC: NEGATIVE — SIGNIFICANT CHANGE UP
MAGNESIUM SERPL-MCNC: 2.4 MG/DL — SIGNIFICANT CHANGE UP (ref 1.6–2.6)
MCHC RBC-ENTMCNC: 29.1 PG — SIGNIFICANT CHANGE UP (ref 27–34)
MCHC RBC-ENTMCNC: 29.4 GM/DL — LOW (ref 32–36)
MCV RBC AUTO: 99.1 FL — SIGNIFICANT CHANGE UP (ref 80–100)
NITRITE UR-MCNC: NEGATIVE — SIGNIFICANT CHANGE UP
NRBC # BLD: 0 /100 WBCS — SIGNIFICANT CHANGE UP (ref 0–0)
NRBC # FLD: 0 K/UL — SIGNIFICANT CHANGE UP (ref 0–0)
PH UR: 6 — SIGNIFICANT CHANGE UP (ref 5–8)
PHOSPHATE SERPL-MCNC: 3.4 MG/DL — SIGNIFICANT CHANGE UP (ref 2.5–4.5)
PLATELET # BLD AUTO: 233 K/UL — SIGNIFICANT CHANGE UP (ref 150–400)
POTASSIUM SERPL-MCNC: 4.7 MMOL/L — SIGNIFICANT CHANGE UP (ref 3.5–5.3)
POTASSIUM SERPL-SCNC: 4.7 MMOL/L — SIGNIFICANT CHANGE UP (ref 3.5–5.3)
PROT UR-MCNC: NEGATIVE — SIGNIFICANT CHANGE UP
RBC # BLD: 3.5 M/UL — LOW (ref 3.8–5.2)
RBC # FLD: 13.2 % — SIGNIFICANT CHANGE UP (ref 10.3–14.5)
RBC CASTS # UR COMP ASSIST: SIGNIFICANT CHANGE UP /HPF (ref 0–4)
SODIUM SERPL-SCNC: 133 MMOL/L — LOW (ref 135–145)
SP GR SPEC: 1.01 — SIGNIFICANT CHANGE UP (ref 1.01–1.05)
UROBILINOGEN FLD QL: SIGNIFICANT CHANGE UP
UUN UR-MCNC: 372.7 MG/DL — SIGNIFICANT CHANGE UP
WBC # BLD: 6.09 K/UL — SIGNIFICANT CHANGE UP (ref 3.8–10.5)
WBC # FLD AUTO: 6.09 K/UL — SIGNIFICANT CHANGE UP (ref 3.8–10.5)
WBC UR QL: SIGNIFICANT CHANGE UP /HPF (ref 0–5)

## 2022-08-29 PROCEDURE — 99232 SBSQ HOSP IP/OBS MODERATE 35: CPT

## 2022-08-29 PROCEDURE — 71045 X-RAY EXAM CHEST 1 VIEW: CPT | Mod: 26

## 2022-08-29 RX ORDER — INSULIN LISPRO 100/ML
15 VIAL (ML) SUBCUTANEOUS
Refills: 0 | Status: DISCONTINUED | OUTPATIENT
Start: 2022-08-29 | End: 2022-08-30

## 2022-08-29 RX ADMIN — LEVALBUTEROL 0.63 MILLIGRAM(S): 1.25 SOLUTION, CONCENTRATE RESPIRATORY (INHALATION) at 21:12

## 2022-08-29 RX ADMIN — LIDOCAINE 1 PATCH: 4 CREAM TOPICAL at 00:00

## 2022-08-29 RX ADMIN — Medication 50 MILLIGRAM(S): at 05:16

## 2022-08-29 RX ADMIN — HEPARIN SODIUM 5000 UNIT(S): 5000 INJECTION INTRAVENOUS; SUBCUTANEOUS at 05:16

## 2022-08-29 RX ADMIN — BUDESONIDE AND FORMOTEROL FUMARATE DIHYDRATE 2 PUFF(S): 160; 4.5 AEROSOL RESPIRATORY (INHALATION) at 09:26

## 2022-08-29 RX ADMIN — Medication 13 UNIT(S): at 08:02

## 2022-08-29 RX ADMIN — LEVALBUTEROL 0.63 MILLIGRAM(S): 1.25 SOLUTION, CONCENTRATE RESPIRATORY (INHALATION) at 09:54

## 2022-08-29 RX ADMIN — ATORVASTATIN CALCIUM 10 MILLIGRAM(S): 80 TABLET, FILM COATED ORAL at 22:40

## 2022-08-29 RX ADMIN — Medication 10 MILLIGRAM(S): at 13:35

## 2022-08-29 RX ADMIN — Medication 4: at 12:02

## 2022-08-29 RX ADMIN — Medication 6: at 22:41

## 2022-08-29 RX ADMIN — Medication 10 MILLIGRAM(S): at 22:40

## 2022-08-29 RX ADMIN — PANTOPRAZOLE SODIUM 40 MILLIGRAM(S): 20 TABLET, DELAYED RELEASE ORAL at 07:59

## 2022-08-29 RX ADMIN — Medication 325 MILLIGRAM(S): at 11:10

## 2022-08-29 RX ADMIN — HEPARIN SODIUM 5000 UNIT(S): 5000 INJECTION INTRAVENOUS; SUBCUTANEOUS at 22:39

## 2022-08-29 RX ADMIN — Medication 10 MILLIGRAM(S): at 05:16

## 2022-08-29 RX ADMIN — LEVALBUTEROL 0.63 MILLIGRAM(S): 1.25 SOLUTION, CONCENTRATE RESPIRATORY (INHALATION) at 15:37

## 2022-08-29 RX ADMIN — BUDESONIDE AND FORMOTEROL FUMARATE DIHYDRATE 2 PUFF(S): 160; 4.5 AEROSOL RESPIRATORY (INHALATION) at 22:39

## 2022-08-29 RX ADMIN — Medication 2: at 08:00

## 2022-08-29 RX ADMIN — LIDOCAINE 1 PATCH: 4 CREAM TOPICAL at 23:15

## 2022-08-29 RX ADMIN — BUMETANIDE 2 MILLIGRAM(S): 0.25 INJECTION INTRAMUSCULAR; INTRAVENOUS at 05:15

## 2022-08-29 RX ADMIN — LEVALBUTEROL 0.63 MILLIGRAM(S): 1.25 SOLUTION, CONCENTRATE RESPIRATORY (INHALATION) at 04:33

## 2022-08-29 RX ADMIN — Medication 40 MILLIGRAM(S): at 17:17

## 2022-08-29 RX ADMIN — HEPARIN SODIUM 5000 UNIT(S): 5000 INJECTION INTRAVENOUS; SUBCUTANEOUS at 13:33

## 2022-08-29 RX ADMIN — LIDOCAINE 1 PATCH: 4 CREAM TOPICAL at 11:11

## 2022-08-29 RX ADMIN — Medication 15 UNIT(S): at 17:17

## 2022-08-29 RX ADMIN — Medication 13 UNIT(S): at 12:02

## 2022-08-29 RX ADMIN — INSULIN GLARGINE 40 UNIT(S): 100 INJECTION, SOLUTION SUBCUTANEOUS at 22:40

## 2022-08-29 RX ADMIN — LIDOCAINE 1 PATCH: 4 CREAM TOPICAL at 19:12

## 2022-08-29 RX ADMIN — ALBUTEROL 2 PUFF(S): 90 AEROSOL, METERED ORAL at 09:26

## 2022-08-29 NOTE — PROGRESS NOTE ADULT - SUBJECTIVE AND OBJECTIVE BOX
SUBJECTIVE / OVERNIGHT EVENTS:pt seen and examined    MEDICATIONS  (STANDING):  atorvastatin 10 milliGRAM(s) Oral at bedtime  budesonide 160 MICROgram(s)/formoterol 4.5 MICROgram(s) Inhaler 2 Puff(s) Inhalation two times a day  buMETAnide Injectable 2 milliGRAM(s) IV Push daily  dextrose 5%. 1000 milliLiter(s) (100 mL/Hr) IV Continuous <Continuous>  dextrose 5%. 1000 milliLiter(s) (50 mL/Hr) IV Continuous <Continuous>  dextrose 50% Injectable 25 Gram(s) IV Push once  dextrose 50% Injectable 12.5 Gram(s) IV Push once  dextrose 50% Injectable 25 Gram(s) IV Push once  ferrous    sulfate 325 milliGRAM(s) Oral daily  glucagon  Injectable 1 milliGRAM(s) IntraMuscular once  heparin   Injectable 5000 Unit(s) SubCutaneous every 8 hours  hydrALAZINE 10 milliGRAM(s) Oral three times a day  insulin glargine Injectable (LANTUS) 40 Unit(s) SubCutaneous at bedtime  insulin lispro (ADMELOG) corrective regimen sliding scale   SubCutaneous three times a day before meals  insulin lispro (ADMELOG) corrective regimen sliding scale   SubCutaneous at bedtime  insulin lispro Injectable (ADMELOG) 15 Unit(s) SubCutaneous three times a day before meals  levalbuterol Inhalation 0.63 milliGRAM(s) Inhalation every 6 hours  lidocaine   4% Patch 1 Patch Transdermal daily  methylPREDNISolone sodium succinate Injectable 40 milliGRAM(s) IV Push two times a day  metoprolol succinate ER 50 milliGRAM(s) Oral daily  pantoprazole    Tablet 40 milliGRAM(s) Oral before breakfast    MEDICATIONS  (PRN):  acetaminophen     Tablet .. 650 milliGRAM(s) Oral every 6 hours PRN Mild Pain (1 - 3), Moderate Pain (4 - 6), Severe Pain (7 - 10)  ALBUTerol    90 MICROgram(s) HFA Inhaler 2 Puff(s) Inhalation every 6 hours PRN Shortness of Breath and/or Wheezing  dextrose Oral Gel 15 Gram(s) Oral once PRN Blood Glucose LESS THAN 70 milliGRAM(s)/deciliter    Vital Signs Last 24 Hrs  T(C): 36.6 (22 @ 17:15), Max: 37.1 (22 @ 06:41)  T(F): 97.8 (22 @ 17:15), Max: 98.8 (22 @ 06:41)  HR: 68 (22 @ 17:15) (63 - 78)  BP: 110/61 (22 @ 17:15) (110/61 - 123/62)  BP(mean): --  RR: 18 (22 @ 17:15) (17 - 18)  SpO2: 98% (22 @ 17:15) (95% - 98%)          Constitutional: No fever, fatigue  Skin: No rash.  Eyes: No recent vision problems or eye pain.  ENT: No congestion, ear pain, or sore throat.  Cardiovascular: No chest pain or palpation.  Respiratory: No cough, shortness of breath, congestion, or wheezing.  Gastrointestinal: No abdominal pain, nausea, vomiting, or diarrhea.  Genitourinary: No dysuria.  Musculoskeletal: No joint swelling.  Neurologic: No headache.    PHYSICAL EXAM:  GENERAL: NAD  EYES: EOMI, PERRLA  NECK: Supple, No JVD  CHEST/LUNG: dec breath sounds at bases  LABS:      133<L>  |  93<L>  |  84<H>  ----------------------------<  194<H>  4.7   |  31  |  1.85<H>    Ca    9.9      29 Aug 2022 05:37  Phos  3.4       Mg     2.40           Creatinine Trend: 1.85 <--, 1.79 <--, 1.81 <--, 1.89 <--, 1.69 <--, 1.54 <--, 1.48 <--, 1.51 <--, 1.76 <--                        10.2   6.09  )-----------( 233      ( 29 Aug 2022 05:37 )             34.7     Urine Studies:  Urinalysis Basic - ( 29 Aug 2022 09:05 )    Color: Light Yellow / Appearance: Clear / S.011 / pH:   Gluc:  / Ketone: Negative  / Bili: Negative / Urobili: <2 mg/dL   Blood:  / Protein: Negative / Nitrite: Negative   Leuk Esterase: Negative / RBC: N/A /HPF / WBC N/A /HPF   Sq Epi:  / Non Sq Epi:  / Bacteria:       Creatinine, Random Urine: 44 mg/dL ( @ 09:05)              HEART:  S1 , S2 +  ABDOMEN: soft, bs+  EXTREMITIES:  edema+  NEUROLOGY:alert awake oriented                    LIVER FUNCTIONS - ( 25 Aug 2022 06:35 )  Alb: 3.6 g/dL / Pro: 7.7 g/dL / ALK PHOS: 95 U/L / ALT: 13 U/L / AST: 16 U/L / GGT: x               Imaging Personally Reviewed:    Consultant(s) Notes Reviewed:  yes    Care Discussed with Consultants/Other Providers:yes

## 2022-08-29 NOTE — PROGRESS NOTE ADULT - SUBJECTIVE AND OBJECTIVE BOX
[facility] NEPHROLOGY- PROGRESS NOTE    72 year old Female with history of COPD, CHF presents with weakness. Nephrology consulted for elevated Scr.    REVIEW OF SYSTEMS:  Gen: no fevers  Cards: + L chest pain  Resp: + dyspnea   GI: no nausea or vomiting or diarrhea  Vascular: + LE edema    No Known Allergies      Hospital Medications: Medications reviewed      VITALS:  T(F): 98.8 (22 @ 06:41), Max: 98.8 (22 @ 06:41)  HR: 63 (22 @ 06:41)  BP: 123/62 (22 @ 06:41)  RR: 17 (22 @ 06:41)  SpO2: 98% (22 @ 06:41)  Wt(kg): --        PHYSICAL EXAM:    Gen: NAD, calm  Cards: Irregularly irregular, +S1/S2, no M/G/R  Resp: L basilar rales?  GI: soft, NT/ND, NABS  Vascular: trace LE edema B/L        LABS:      133<L>  |  93<L>  |  84<H>  ----------------------------<  194<H>  4.7   |  31  |  1.85<H>    Ca    9.9      29 Aug 2022 05:37  Phos  3.4       Mg     2.40           Creatinine Trend: 1.85 <--, 1.79 <--, 1.81 <--, 1.89 <--, 1.69 <--, 1.54 <--, 1.48 <--, 1.51 <--, 1.76 <--                        10.2   6.09  )-----------( 233      ( 29 Aug 2022 05:37 )             34.7     Urine Studies:  Urinalysis Basic - ( 24 Aug 2022 00:28 )    Color: Light Yellow / Appearance: Clear / S.021 / pH:   Gluc:  / Ketone: Negative  / Bili: Negative / Urobili: <2 mg/dL   Blood:  / Protein: Negative / Nitrite: Negative   Leuk Esterase: Negative / RBC: 1 /HPF / WBC 1 /HPF   Sq Epi:  / Non Sq Epi: 2 /HPF / Bacteria: Negative

## 2022-08-29 NOTE — PROGRESS NOTE ADULT - ASSESSMENT
Assessment and Plan:   · Assessment	    A/P: 71 y/o F w/ COPD on home O2(3L) with severe pHTN, OHS/MIGUEL on home biPAP, HTN, HLD admitted for hyperglycemia >600 with c/f HHS.  Endocrine consulted for management of hyperglycemia/new T2DM.  Patient does not meet full criteria for HHS (serum glc >600, plasma osmolality >320, AMS), and is responding adequately to insulin but remains with fasting hyperglycemia     1. Hyperglycemia/DM2  - Standing weight: 107kg   - Consistent carbohydrate diet  - Hyperglycemia improving  - Will continue Lantus 40 units at bedtime and increase Admelog to 15 units premeal TID  - moderate dose Admelog ISS pre-meal, moderate dose Admelog ISS at bedtime  - Glucose Target 100 -180 mg/dl  - hypoglycemia protocol in place   - Nutrition consult (completed)  - Provider to RN: Insulin pen administration and glucometer teaching prior to discharge. Please send glucometer to [facility] and teach pt with her own glucometer prior to discharge.    DC planning  - ____ levemir at bedtime and ____ novolog premeal (PENS)  - Discuss with patient the importance of Carb consistent diet and exercise as tolerated.    - Recommend nutritional consultation  inpt prior to dc (completed)  - Discuss glycemic goal of a1c <7% to prevent microvascular complications of diabetes mellitus and reduce the risk of macrovascular complications.  - Make sure patient knows how to inject insulin and check fingersticks with glucometer (ask bedside RN for teaching)  - Patient's  takes Levemir and Novolog at home and patient reports he will assist her  -  pt on kinetics and action of basal and prandial insulin.    Pt should call their doctor when FSBG <70 or above >400 and or consistently above 200s as changes in the regimen will have to be made.  -Pt should call PMD for DM related questions or concerns until pt is seen by CDE or endocrine   -Recommend annual podiatry and ophthalmology follow up.       Patient has follow-up with [facility] Endocrinology    NP Linda 9/9 1:30 PM   Dr. Henderson 11/14 12:20 PM  [facility]  [address] [place] [address], [address], [place] [address]  [phone]   Please call to schedule appointment with CDE/Nutritionist and MD appointment next available   Ensure patient has working glucometer, test strips, lancets, alcohol pads, and BD sharan pen needles  Please also prescribe glucose tabs, Baqsimi nasal spray or glucagon emergency kit for hypoglycemia risk       2. Steroid use  - Patient intermittently using prednisone 10mg for SOB, last dose 2 days ago.  - BPs stable, steroid withdrawal symptoms of less concern  - Continues off prednisone at the present time   she is not on long  term steroids therefore risk of secondary AI is decreased       3. HTN  - on bumex 1mg bid, hydralazine 25mg tid, goal <130/80 and >100/60  - outpt mc/cr  - Defer management to primary team     4. HLD  - on atorvastatin 10mg qd  - LDL is not at goal of less than 70 (currently 110), would suggest increasing statin therapy if no contraindication  - Defer management to primary team     EDDY Ordaz-BC  Nurse Practitioner   Division of Endocrinology  Pager: NATALIA pager [phone]    If out of hospital/unavailable when paged, please note: patient will be cared for by another provider on the endocrine service.  Please call the endocrine answering service for assistance to reach covering provider ([phone]). For non-urgent matters, please email [email or web address] for assistance.

## 2022-08-29 NOTE — PROGRESS NOTE ADULT - ASSESSMENT
72 year old Female with history of COPD, CHF presents with weakness. Nephrology consulted for elevated Scr.    1) MISAEL: mild and in setting of hyperglycemia causing osmotic diuresis? Scr stable. Check UA, urine urea, urine creatinine and bladder scan. Avoid nephrotoxins.    2) CKD-3b: Baseline Scr 1.4-1.6 with CKD likely due to DM. Outpatient CKD work up. Monitor electrolytes.    3) HTN with CKD: BP controlled. Continue with current medications. Monitor BP.    4) LE edema: Continue with bumex 2 mg IV daily. Repeat CXR. Will increase to BID if CXR shows worsening congestion. Prior TTE with grossly normal LVSF. Monitor UO.    5) L renal mass: Patient refusing inpatient work up. Outpatient Urology evaluation.      [facility] NEPHROLOGY  Rodrigue Amador M.D.  Rob Perez D.O.  Ana Song M.D.  Lucrecia López, ALLISON, ANP-C    Telephone: [phone]  Facsimile: [phone]    [address] [place] [address]

## 2022-08-29 NOTE — PROGRESS NOTE ADULT - SUBJECTIVE AND OBJECTIVE BOX
PULMONARY PROGRESS NOTE    DAMARI GUERRERO  MRN-[id number]    Patient is a 72y old  Female who presents with a chief complaint of lightheadedness and fatigue for 5 days (29 Aug 2022 08:18)      HPI:  -seen on 2L  she is complaining of left sided back pain  not pleuritic  no cough  used avaps  denies trauma  -    ROS:   -    ACTIVE MEDICATION LIST:  MEDICATIONS  (STANDING):  atorvastatin 10 milliGRAM(s) Oral at bedtime  budesonide 160 MICROgram(s)/formoterol 4.5 MICROgram(s) Inhaler 2 Puff(s) Inhalation two times a day  buMETAnide Injectable 2 milliGRAM(s) IV Push daily  dextrose 5%. 1000 milliLiter(s) (50 mL/Hr) IV Continuous <Continuous>  dextrose 5%. 1000 milliLiter(s) (100 mL/Hr) IV Continuous <Continuous>  dextrose 50% Injectable 25 Gram(s) IV Push once  dextrose 50% Injectable 25 Gram(s) IV Push once  dextrose 50% Injectable 12.5 Gram(s) IV Push once  ferrous    sulfate 325 milliGRAM(s) Oral daily  glucagon  Injectable 1 milliGRAM(s) IntraMuscular once  heparin   Injectable 5000 Unit(s) SubCutaneous every 8 hours  hydrALAZINE 10 milliGRAM(s) Oral three times a day  insulin glargine Injectable (LANTUS) 40 Unit(s) SubCutaneous at bedtime  insulin lispro (ADMELOG) corrective regimen sliding scale   SubCutaneous three times a day before meals  insulin lispro (ADMELOG) corrective regimen sliding scale   SubCutaneous at bedtime  insulin lispro Injectable (ADMELOG) 13 Unit(s) SubCutaneous three times a day before meals  levalbuterol Inhalation 0.63 milliGRAM(s) Inhalation every 6 hours  lidocaine   4% Patch 1 Patch Transdermal daily  metoprolol succinate ER 50 milliGRAM(s) Oral daily  pantoprazole    Tablet 40 milliGRAM(s) Oral before breakfast    MEDICATIONS  (PRN):  acetaminophen     Tablet .. 650 milliGRAM(s) Oral every 6 hours PRN Mild Pain (1 - 3), Moderate Pain (4 - 6), Severe Pain (7 - 10)  ALBUTerol    90 MICROgram(s) HFA Inhaler 2 Puff(s) Inhalation every 6 hours PRN Shortness of Breath and/or Wheezing  dextrose Oral Gel 15 Gram(s) Oral once PRN Blood Glucose LESS THAN 70 milliGRAM(s)/deciliter      EXAM:  Vital Signs Last 24 Hrs  T(C): 37.1 (29 Aug 2022 06:41), Max: 37.1 (29 Aug 2022 06:41)  T(F): 98.8 (29 Aug 2022 06:41), Max: 98.8 (29 Aug 2022 06:41)  HR: 63 (29 Aug 2022 06:41) (63 - 89)  BP: 123/62 (29 Aug 2022 06:41) (106/73 - 126/72)  BP(mean): --  RR: 17 (29 Aug 2022 06:41) (17 - 18)  SpO2: 98% (29 Aug 2022 06:41) (95% - 100%)    Parameters below as of 29 Aug 2022 06:41  Patient On (Oxygen Delivery Method): BiPAP/CPAP        GENERAL: The patient is awake and alert in no apparent distress.     LUNGS:she is not labored  although diminished, no obvious wheeze               10.2   6.09  )-----------( 233      ( 29 Aug 2022 05:37 )             34.7       08-29    133<L>  |  93<L>  |  84<H>  ----------------------------<  194<H>  4.7   |  31  |  1.85<H>    Ca    9.9      29 Aug 2022 05:37  Phos  3.4     08-29  Mg     2.40     08-29       < from: Xray Chest 1 View- PORTABLE-Urgent (Xray Chest 1 View- PORTABLE-Urgent .) (08.23.22 @ 23:31) >    ACC: [id number] EXAM:  XR CHEST PORTABLE URGENT 1V                          PROCEDURE DATE:  08/23/2022          INTERPRETATION:  EXAMINATION: XR CHEST URGENT    CLINICAL INDICATION: sob    TECHNIQUE: Single frontal, portable view of the chest was obtained.    COMPARISON: Chest radiograph 1/2/2022.    FINDINGS:  The heart is enlarged. Status post median sternotomy and bowel repair.  Perihilar fullness. No focal consolidations.  No pleural effusion or pneumothorax.    IMPRESSION:  Cardiomegaly with clear lungs.    --- End of Report ---          EDDIE ZAFAR MD; Resident Radiology  This document has been electronically signed.  DELIA PEDERSEN MD; Attending Radiologist  This document has been electronically signed. Aug 24 2022  8:10AM    < end of copied text >  < from: CT Chest No Cont (01.08.22 @ 13:17) >  Oral Contrast: NONE  Complications: None reported at time of study completion    PROCEDURE:  CT of the Chest was performed.  Sagittal and coronal reformats were performed.    FINDINGS:    LUNGS AND AIRWAYS: Patent central airways.  Mosaic attenuation. Unchanged   left apical ill-defined linear opacities. Right upper lobe 3 mm nodule   (2, 51).  PLEURA: No pleural effusion.  MEDIASTINUM AND CARMELA: No lymphadenopathy.  VESSELS: Within normal limits.  HEART: Cardiomegaly. No pericardial effusion. Status post mitral and   tricuspid valve repair.  CHEST WALL AND LOWER NECK: Status post median sternotomy. Retained   epicardial pacer wires.  VISUALIZED UPPER ABDOMEN: Redemonstrated 3.5 cm left upper pole renal   mass.  BONES: Within normal limits.    IMPRESSION:  1.  Mosaic attenuation. Unchanged left apical ill-defined linear   opacities.  2.  Right upper lobe 2 mm nodule, new from the prior study and   indeterminate  3.  Redemonstrated 3.5 cm left upper pole renal mass concerning for   neoplasm. Recommend further evaluation with contrast-enhanced MR abdomen.        --- End of Report ---            < end of copied text >      PROBLEM LIST:  72y Female with HEALTH ISSUES - PROBLEM Dx:  Hyperglycemia due to diabetes mellitus    Chronic diastolic heart failure    MIGUEL treated with BiPAP    COPD, severe    Chronic atrial fibrillation    Need for prophylactic measure    HLD (hyperlipidemia)    Diabetes mellitus, new onset    Hypertension    Acute kidney injury superimposed on CKD           RECS:  left sided backPain chest pain- unclear etiology  repeat xray suggests fluid overload but will wait on official report  can start solumedrol 40 IVP BID with plan for quick taper  continue inhalers  would check dimer, if elevated would go ahead with US dopplers le to r/o dvt & maybe do a vq scan in setting of her CKD  continue avaps  continue 0 2        Please call with any questions.    Irma Eaton, DO  [facility] Pulmonary/Sleep Medicine  [phone]

## 2022-08-30 DIAGNOSIS — R73.9 HYPERGLYCEMIA, UNSPECIFIED: ICD-10-CM

## 2022-08-30 LAB
ANION GAP SERPL CALC-SCNC: 9 MMOL/L — SIGNIFICANT CHANGE UP (ref 7–14)
BUN SERPL-MCNC: 82 MG/DL — HIGH (ref 7–23)
CALCIUM SERPL-MCNC: 10.3 MG/DL — SIGNIFICANT CHANGE UP (ref 8.4–10.5)
CHLORIDE SERPL-SCNC: 92 MMOL/L — LOW (ref 98–107)
CO2 SERPL-SCNC: 30 MMOL/L — SIGNIFICANT CHANGE UP (ref 22–31)
CREAT SERPL-MCNC: 1.65 MG/DL — HIGH (ref 0.5–1.3)
D DIMER BLD IA.RAPID-MCNC: 201 NG/ML DDU — SIGNIFICANT CHANGE UP
EGFR: 32 ML/MIN/1.73M2 — LOW
GLUCOSE BLDC GLUCOMTR-MCNC: 280 MG/DL — HIGH (ref 70–99)
GLUCOSE BLDC GLUCOMTR-MCNC: 304 MG/DL — HIGH (ref 70–99)
GLUCOSE BLDC GLUCOMTR-MCNC: 344 MG/DL — HIGH (ref 70–99)
GLUCOSE BLDC GLUCOMTR-MCNC: 397 MG/DL — HIGH (ref 70–99)
GLUCOSE SERPL-MCNC: 317 MG/DL — HIGH (ref 70–99)
HCT VFR BLD CALC: 36.2 % — SIGNIFICANT CHANGE UP (ref 34.5–45)
HGB BLD-MCNC: 10.8 G/DL — LOW (ref 11.5–15.5)
ISLET CELL512 AB SER-SCNC: 0 NMOL/L — SIGNIFICANT CHANGE UP
MAGNESIUM SERPL-MCNC: 2.2 MG/DL — SIGNIFICANT CHANGE UP (ref 1.6–2.6)
MCHC RBC-ENTMCNC: 29.1 PG — SIGNIFICANT CHANGE UP (ref 27–34)
MCHC RBC-ENTMCNC: 29.8 GM/DL — LOW (ref 32–36)
MCV RBC AUTO: 97.6 FL — SIGNIFICANT CHANGE UP (ref 80–100)
NRBC # BLD: 0 /100 WBCS — SIGNIFICANT CHANGE UP (ref 0–0)
NRBC # FLD: 0 K/UL — SIGNIFICANT CHANGE UP (ref 0–0)
PHOSPHATE SERPL-MCNC: 3.5 MG/DL — SIGNIFICANT CHANGE UP (ref 2.5–4.5)
PLATELET # BLD AUTO: 242 K/UL — SIGNIFICANT CHANGE UP (ref 150–400)
POTASSIUM SERPL-MCNC: 5.5 MMOL/L — HIGH (ref 3.5–5.3)
POTASSIUM SERPL-SCNC: 5.5 MMOL/L — HIGH (ref 3.5–5.3)
RBC # BLD: 3.71 M/UL — LOW (ref 3.8–5.2)
RBC # FLD: 13.3 % — SIGNIFICANT CHANGE UP (ref 10.3–14.5)
SARS-COV-2 RNA SPEC QL NAA+PROBE: SIGNIFICANT CHANGE UP
SODIUM SERPL-SCNC: 131 MMOL/L — LOW (ref 135–145)
WBC # BLD: 6.84 K/UL — SIGNIFICANT CHANGE UP (ref 3.8–10.5)
WBC # FLD AUTO: 6.84 K/UL — SIGNIFICANT CHANGE UP (ref 3.8–10.5)

## 2022-08-30 PROCEDURE — 99232 SBSQ HOSP IP/OBS MODERATE 35: CPT

## 2022-08-30 RX ORDER — SODIUM ZIRCONIUM CYCLOSILICATE 10 G/10G
10 POWDER, FOR SUSPENSION ORAL ONCE
Refills: 0 | Status: COMPLETED | OUTPATIENT
Start: 2022-08-30 | End: 2022-08-30

## 2022-08-30 RX ORDER — BUMETANIDE 0.25 MG/ML
2 INJECTION INTRAMUSCULAR; INTRAVENOUS
Refills: 0 | Status: COMPLETED | OUTPATIENT
Start: 2022-08-30 | End: 2022-09-01

## 2022-08-30 RX ORDER — INSULIN LISPRO 100/ML
20 VIAL (ML) SUBCUTANEOUS
Refills: 0 | Status: DISCONTINUED | OUTPATIENT
Start: 2022-08-30 | End: 2022-08-31

## 2022-08-30 RX ORDER — INSULIN GLARGINE 100 [IU]/ML
48 INJECTION, SOLUTION SUBCUTANEOUS AT BEDTIME
Refills: 0 | Status: DISCONTINUED | OUTPATIENT
Start: 2022-08-30 | End: 2022-08-31

## 2022-08-30 RX ADMIN — INSULIN GLARGINE 48 UNIT(S): 100 INJECTION, SOLUTION SUBCUTANEOUS at 23:40

## 2022-08-30 RX ADMIN — Medication 10 MILLIGRAM(S): at 14:08

## 2022-08-30 RX ADMIN — Medication 8: at 07:59

## 2022-08-30 RX ADMIN — Medication 15 UNIT(S): at 11:52

## 2022-08-30 RX ADMIN — LIDOCAINE 1 PATCH: 4 CREAM TOPICAL at 19:35

## 2022-08-30 RX ADMIN — Medication 15 UNIT(S): at 08:00

## 2022-08-30 RX ADMIN — ATORVASTATIN CALCIUM 10 MILLIGRAM(S): 80 TABLET, FILM COATED ORAL at 23:51

## 2022-08-30 RX ADMIN — BUMETANIDE 2 MILLIGRAM(S): 0.25 INJECTION INTRAMUSCULAR; INTRAVENOUS at 17:36

## 2022-08-30 RX ADMIN — Medication 10 MILLIGRAM(S): at 23:44

## 2022-08-30 RX ADMIN — LIDOCAINE 1 PATCH: 4 CREAM TOPICAL at 11:40

## 2022-08-30 RX ADMIN — HEPARIN SODIUM 5000 UNIT(S): 5000 INJECTION INTRAVENOUS; SUBCUTANEOUS at 14:08

## 2022-08-30 RX ADMIN — Medication 20 UNIT(S): at 17:32

## 2022-08-30 RX ADMIN — PANTOPRAZOLE SODIUM 40 MILLIGRAM(S): 20 TABLET, DELAYED RELEASE ORAL at 07:11

## 2022-08-30 RX ADMIN — HEPARIN SODIUM 5000 UNIT(S): 5000 INJECTION INTRAVENOUS; SUBCUTANEOUS at 23:44

## 2022-08-30 RX ADMIN — SODIUM ZIRCONIUM CYCLOSILICATE 10 GRAM(S): 10 POWDER, FOR SUSPENSION ORAL at 11:40

## 2022-08-30 RX ADMIN — Medication 325 MILLIGRAM(S): at 11:41

## 2022-08-30 RX ADMIN — Medication 8: at 11:52

## 2022-08-30 RX ADMIN — HEPARIN SODIUM 5000 UNIT(S): 5000 INJECTION INTRAVENOUS; SUBCUTANEOUS at 05:38

## 2022-08-30 RX ADMIN — LEVALBUTEROL 0.63 MILLIGRAM(S): 1.25 SOLUTION, CONCENTRATE RESPIRATORY (INHALATION) at 04:19

## 2022-08-30 RX ADMIN — Medication 50 MILLIGRAM(S): at 05:36

## 2022-08-30 RX ADMIN — BUDESONIDE AND FORMOTEROL FUMARATE DIHYDRATE 2 PUFF(S): 160; 4.5 AEROSOL RESPIRATORY (INHALATION) at 11:41

## 2022-08-30 RX ADMIN — Medication 6: at 17:31

## 2022-08-30 RX ADMIN — Medication 40 MILLIGRAM(S): at 17:30

## 2022-08-30 RX ADMIN — LEVALBUTEROL 0.63 MILLIGRAM(S): 1.25 SOLUTION, CONCENTRATE RESPIRATORY (INHALATION) at 15:27

## 2022-08-30 RX ADMIN — LEVALBUTEROL 0.63 MILLIGRAM(S): 1.25 SOLUTION, CONCENTRATE RESPIRATORY (INHALATION) at 09:44

## 2022-08-30 RX ADMIN — Medication 10 MILLIGRAM(S): at 05:36

## 2022-08-30 RX ADMIN — Medication 6: at 23:39

## 2022-08-30 RX ADMIN — LEVALBUTEROL 0.63 MILLIGRAM(S): 1.25 SOLUTION, CONCENTRATE RESPIRATORY (INHALATION) at 22:02

## 2022-08-30 NOTE — PROGRESS NOTE ADULT - ASSESSMENT
72 year old Female with history of COPD, CHF presents with weakness. Nephrology consulted for elevated Scr.    1) MISAEL: Secondary to CRS? MISAEL resolved. UA bland. FeUrea low. Bladder scan not performed. Avoid nephrotoxins.    2) CKD-3b: Baseline Scr 1.4-1.6 with CKD likely due to DM. Outpatient CKD work up. Monitor electrolytes.    3) HTN with CKD: BP controlled. Continue with current medications. Monitor BP.    4) LE edema: With mild pulmonary edema on CXR. Increase bumex to 2 mg IV twice daily. Prior TTE with grossly normal LVSF. Monitor UO.    5) L renal mass: Patient refusing inpatient work up. Outpatient Urology evaluation.      [facility] NEPHROLOGY  Rodrigue Amador M.D.  Rob Perez D.O.  Ana Song M.D.  Lucrecia López, MSN, ANP-C    Telephone: [phone]  Facsimile: [phone]    [address] [place] [address]       72 year old Female with history of COPD, CHF presents with weakness. Nephrology consulted for elevated Scr.    1) MISAEL: Secondary to CRS? MISAEL resolved. UA bland. FeUrea low. Bladder scan not performed. Avoid nephrotoxins.    2) CKD-3b: Baseline Scr 1.4-1.6 with CKD likely due to DM. Outpatient CKD work up. Monitor electrolytes.    3) HTN with CKD: BP controlled. Continue with current medications. Monitor BP.    4) LE edema: With mild pulmonary edema on CXR. Increase bumex to 2 mg IV twice daily. Prior TTE with grossly normal LVSF. Monitor UO.    5) L renal mass: Patient refusing inpatient work up. Outpatient Urology evaluation.    6) Hyperkalemia: Mild and in setting of hyperglycemia. S/P lokelma. Continue with low K diet. Monitor serum K.    [facility] NEPHROLOGY  Rodrigue Amador M.D.  Rob Perez D.O.  Ana Song M.D.  Lucrecia López, MSN, ANP-C    Telephone: [phone]  Facsimile: [phone]    [address] [place] [id number]

## 2022-08-30 NOTE — PROGRESS NOTE ADULT - SUBJECTIVE AND OBJECTIVE BOX
Chief Complaint: HHS, uncontrolled DM 2 with hyperglycemia     History: Patient seen at bedside. Endorsing SOB today. Eating partial meals. Started on steroids - Solumedrol 40 mg BID with resulting hyperglycemia to 300s    MEDICATIONS  (STANDING):  atorvastatin 10 milliGRAM(s) Oral at bedtime  budesonide 160 MICROgram(s)/formoterol 4.5 MICROgram(s) Inhaler 2 Puff(s) Inhalation two times a day  buMETAnide Injectable 2 milliGRAM(s) IV Push two times a day  dextrose 5%. 1000 milliLiter(s) (100 mL/Hr) IV Continuous <Continuous>  dextrose 5%. 1000 milliLiter(s) (50 mL/Hr) IV Continuous <Continuous>  dextrose 50% Injectable 25 Gram(s) IV Push once  dextrose 50% Injectable 12.5 Gram(s) IV Push once  dextrose 50% Injectable 25 Gram(s) IV Push once  ferrous    sulfate 325 milliGRAM(s) Oral daily  glucagon  Injectable 1 milliGRAM(s) IntraMuscular once  heparin   Injectable 5000 Unit(s) SubCutaneous every 8 hours  hydrALAZINE 10 milliGRAM(s) Oral three times a day  insulin glargine Injectable (LANTUS) 40 Unit(s) SubCutaneous at bedtime  insulin lispro (ADMELOG) corrective regimen sliding scale   SubCutaneous three times a day before meals  insulin lispro (ADMELOG) corrective regimen sliding scale   SubCutaneous at bedtime  insulin lispro Injectable (ADMELOG) 15 Unit(s) SubCutaneous three times a day before meals  levalbuterol Inhalation 0.63 milliGRAM(s) Inhalation every 6 hours  lidocaine   4% Patch 1 Patch Transdermal daily  methylPREDNISolone sodium succinate Injectable 40 milliGRAM(s) IV Push two times a day  metoprolol succinate ER 50 milliGRAM(s) Oral daily  pantoprazole    Tablet 40 milliGRAM(s) Oral before breakfast    MEDICATIONS  (PRN):  acetaminophen     Tablet .. 650 milliGRAM(s) Oral every 6 hours PRN Mild Pain (1 - 3), Moderate Pain (4 - 6), Severe Pain (7 - 10)  ALBUTerol    90 MICROgram(s) HFA Inhaler 2 Puff(s) Inhalation every 6 hours PRN Shortness of Breath and/or Wheezing  dextrose Oral Gel 15 Gram(s) Oral once PRN Blood Glucose LESS THAN 70 milliGRAM(s)/deciliter    No Known Allergies    Review of Systems:  Cardiovascular: No chest pain  Respiratory: No SOB  GI: No nausea, vomiting  Endocrine: no hypoglycemia     PHYSICAL EXAM:  VITALS: T(C): 36.5 (08-30-22 @ 11:30)  T(F): 97.7 (08-30-22 @ 11:30), Max: 98.7 (08-29-22 @ 22:40)  HR: 72 (08-30-22 @ 15:31) (68 - 86)  BP: 103/50 (08-30-22 @ 14:06) (102/62 - 127/63)  RR:  (17 - 18)  SpO2:  (95% - 100%)  Wt(kg): --  GENERAL: NAD  EYES: No proptosis, no lid lag, anicteric  HEENT:  Atraumatic, Normocephalic  RESPIRATORY: on nasal canula, noted + use of accessory muscles   PSYCH: Alert and oriented x 3    CAPILLARY BLOOD GLUCOSE    POCT Blood Glucose.: 304 mg/dL (30 Aug 2022 11:49)  POCT Blood Glucose.: 344 mg/dL (30 Aug 2022 07:18)  POCT Blood Glucose.: 358 mg/dL (29 Aug 2022 22:25)  POCT Blood Glucose.: 138 mg/dL (29 Aug 2022 16:43)      08-30    131<L>  |  92<L>  |  82<H>  ----------------------------<  317<H>  5.5<H>   |  30  |  1.65<H>    eGFR: 32<L>    Ca    10.3      08-30  Mg     2.20     08-30  Phos  3.5     08-30      A1C with Estimated Average Glucose Result: 10.7 % (08-23-22 @ 23:18)    Diet, Consistent Carbohydrate/No Snacks:   No Concentrated Potassium  Low Sodium  Supplement Feeding Modality:  Oral  Nepro Cans or Servings Per Day:  1       Frequency:  Three Times a day (08-26-22 @ 09:43) [Active]

## 2022-08-30 NOTE — PROGRESS NOTE ADULT - SUBJECTIVE AND OBJECTIVE BOX
SUBJECTIVE / OVERNIGHT EVENTS:pt seen and examined    MEDICATIONS  (STANDING):  atorvastatin 10 milliGRAM(s) Oral at bedtime  budesonide 160 MICROgram(s)/formoterol 4.5 MICROgram(s) Inhaler 2 Puff(s) Inhalation two times a day  buMETAnide Injectable 2 milliGRAM(s) IV Push two times a day  dextrose 5%. 1000 milliLiter(s) (100 mL/Hr) IV Continuous <Continuous>  dextrose 5%. 1000 milliLiter(s) (50 mL/Hr) IV Continuous <Continuous>  dextrose 50% Injectable 25 Gram(s) IV Push once  dextrose 50% Injectable 12.5 Gram(s) IV Push once  dextrose 50% Injectable 25 Gram(s) IV Push once  ferrous    sulfate 325 milliGRAM(s) Oral daily  glucagon  Injectable 1 milliGRAM(s) IntraMuscular once  heparin   Injectable 5000 Unit(s) SubCutaneous every 8 hours  hydrALAZINE 10 milliGRAM(s) Oral three times a day  insulin glargine Injectable (LANTUS) 48 Unit(s) SubCutaneous at bedtime  insulin lispro (ADMELOG) corrective regimen sliding scale   SubCutaneous three times a day before meals  insulin lispro (ADMELOG) corrective regimen sliding scale   SubCutaneous at bedtime  insulin lispro Injectable (ADMELOG) 20 Unit(s) SubCutaneous three times a day before meals  levalbuterol Inhalation 0.63 milliGRAM(s) Inhalation every 6 hours  lidocaine   4% Patch 1 Patch Transdermal daily  methylPREDNISolone sodium succinate Injectable 40 milliGRAM(s) IV Push two times a day  metoprolol succinate ER 50 milliGRAM(s) Oral daily  pantoprazole    Tablet 40 milliGRAM(s) Oral before breakfast    MEDICATIONS  (PRN):  acetaminophen     Tablet .. 650 milliGRAM(s) Oral every 6 hours PRN Mild Pain (1 - 3), Moderate Pain (4 - 6), Severe Pain (7 - 10)  ALBUTerol    90 MICROgram(s) HFA Inhaler 2 Puff(s) Inhalation every 6 hours PRN Shortness of Breath and/or Wheezing  dextrose Oral Gel 15 Gram(s) Oral once PRN Blood Glucose LESS THAN 70 milliGRAM(s)/deciliter    Vital Signs Last 24 Hrs  T(C): 36.5 (22 @ 11:30), Max: 37.1 (22 @ 22:40)  T(F): 97.7 (22 @ 11:30), Max: 98.7 (22 @ 22:40)  HR: 72 (22 @ 15:31) (72 - 86)  BP: 103/50 (22 @ 14:06) (102/62 - 127/63)  BP(mean): --  RR: 18 (22 @ 11:30) (17 - 18)  SpO2: 97% (22 @ 15:31) (95% - 100%)              Constitutional: No fever, fatigue  Skin: No rash.  Eyes: No recent vision problems or eye pain.  ENT: No congestion, ear pain, or sore throat.  Cardiovascular: No chest pain or palpation.  Respiratory: No cough, shortness of breath, congestion, or wheezing.  Gastrointestinal: No abdominal pain, nausea, vomiting, or diarrhea.  Genitourinary: No dysuria.  Musculoskeletal: No joint swelling.  Neurologic: No headache.    PHYSICAL EXAM:  GENERAL: NAD  EYES: EOMI, PERRLA  NECK: Supple, No JVD  CHEST/LUNG: dec breath sounds at bases  HEART:  S1 , S2 +  ABDOMEN: soft, bs+  EXTREMITIES:  edema+  NEUROLOGY:alert awake oriented      LABS:      131<L>  |  92<L>  |  82<H>  ----------------------------<  317<H>  5.5<H>   |  30  |  1.65<H>    Ca    10.3      30 Aug 2022 05:20  Phos  3.5       Mg     2.20           Creatinine Trend: 1.65 <--, 1.85 <--, 1.79 <--, 1.81 <--, 1.89 <--, 1.69 <--, 1.54 <--, 1.48 <--, 1.51 <--, 1.76 <--                        10.8   6.84  )-----------( 242      ( 30 Aug 2022 05:20 )             36.2     Urine Studies:  Urinalysis Basic - ( 29 Aug 2022 09:05 )    Color: Light Yellow / Appearance: Clear / S.011 / pH:   Gluc:  / Ketone: Negative  / Bili: Negative / Urobili: <2 mg/dL   Blood:  / Protein: Negative / Nitrite: Negative   Leuk Esterase: Negative / RBC: N/A /HPF / WBC N/A /HPF   Sq Epi:  / Non Sq Epi:  / Bacteria:       Creatinine, Random Urine: 44 mg/dL ( @ 09:05)                Imaging Personally Reviewed:    Consultant(s) Notes Reviewed:  yes    Care Discussed with Consultants/Other Providers:yes

## 2022-08-30 NOTE — PROGRESS NOTE ADULT - SUBJECTIVE AND OBJECTIVE BOX
PULMONARY PROGRESS NOTE    DAMARI GUERRERO  MRN-[id number]    Patient is a 72y old  Female who presents with a chief complaint of lightheadedness and fatigue for 5 days (30 Aug 2022 11:21)      HPI:  -seen earlier today  she is on 3L . has a hard time breathing  feels she can not get enough air  denies pleuritic pain  denies cough        ROS:   -    ACTIVE MEDICATION LIST:  MEDICATIONS  (STANDING):  atorvastatin 10 milliGRAM(s) Oral at bedtime  budesonide 160 MICROgram(s)/formoterol 4.5 MICROgram(s) Inhaler 2 Puff(s) Inhalation two times a day  buMETAnide Injectable 2 milliGRAM(s) IV Push two times a day  dextrose 5%. 1000 milliLiter(s) (100 mL/Hr) IV Continuous <Continuous>  dextrose 5%. 1000 milliLiter(s) (50 mL/Hr) IV Continuous <Continuous>  dextrose 50% Injectable 25 Gram(s) IV Push once  dextrose 50% Injectable 12.5 Gram(s) IV Push once  dextrose 50% Injectable 25 Gram(s) IV Push once  ferrous    sulfate 325 milliGRAM(s) Oral daily  glucagon  Injectable 1 milliGRAM(s) IntraMuscular once  heparin   Injectable 5000 Unit(s) SubCutaneous every 8 hours  hydrALAZINE 10 milliGRAM(s) Oral three times a day  insulin glargine Injectable (LANTUS) 40 Unit(s) SubCutaneous at bedtime  insulin lispro (ADMELOG) corrective regimen sliding scale   SubCutaneous three times a day before meals  insulin lispro (ADMELOG) corrective regimen sliding scale   SubCutaneous at bedtime  insulin lispro Injectable (ADMELOG) 15 Unit(s) SubCutaneous three times a day before meals  levalbuterol Inhalation 0.63 milliGRAM(s) Inhalation every 6 hours  lidocaine   4% Patch 1 Patch Transdermal daily  methylPREDNISolone sodium succinate Injectable 40 milliGRAM(s) IV Push two times a day  metoprolol succinate ER 50 milliGRAM(s) Oral daily  pantoprazole    Tablet 40 milliGRAM(s) Oral before breakfast    MEDICATIONS  (PRN):  acetaminophen     Tablet .. 650 milliGRAM(s) Oral every 6 hours PRN Mild Pain (1 - 3), Moderate Pain (4 - 6), Severe Pain (7 - 10)  ALBUTerol    90 MICROgram(s) HFA Inhaler 2 Puff(s) Inhalation every 6 hours PRN Shortness of Breath and/or Wheezing  dextrose Oral Gel 15 Gram(s) Oral once PRN Blood Glucose LESS THAN 70 milliGRAM(s)/deciliter      EXAM:  Vital Signs Last 24 Hrs  T(C): 36.7 (30 Aug 2022 05:38), Max: 37.1 (29 Aug 2022 22:40)  T(F): 98.1 (30 Aug 2022 05:38), Max: 98.7 (29 Aug 2022 22:40)  HR: 86 (30 Aug 2022 07:39) (64 - 86)  BP: 118/67 (30 Aug 2022 05:38) (110/61 - 127/63)  BP(mean): --  RR: 17 (30 Aug 2022 05:38) (17 - 18)  SpO2: 95% (30 Aug 2022 07:39) (95% - 99%)    Parameters below as of 30 Aug 2022 05:38  Patient On (Oxygen Delivery Method): nasal cannula  O2 Flow (L/min): 3      GENERAL: The patient is awake and alert  she is not labored  although decrease breath sounds, not wheezing  02- 3L in place                          10.8   6.84  )-----------( 242      ( 30 Aug 2022 05:20 )             36.2       08-30    131<L>  |  92<L>  |  82<H>  ----------------------------<  317<H>  5.5<H>   |  30  |  1.65<H>    Ca    10.3      30 Aug 2022 05:20  Phos  3.5     08-30  Mg     2.20     08-30       < from: Xray Chest 1 View- PORTABLE-Urgent (Xray Chest 1 View- PORTABLE-Urgent .) (08.29.22 @ 09:49) >    ACC: [id number] EXAM:  XR CHEST PORTABLE URGENT 1V                        ACC: [id number] EXAM:  XR CHEST PORTABLE URGENT 1V                          PROCEDURE DATE:  08/29/2022          INTERPRETATION:  CLINICAL INFORMATION: Dyspnea    TIME OF EXAMINATION: August 27, 2022 at 1:24 PM and August 29 at 8:45 AM    EXAM: Portable chest    FINDINGS:  August 27 at 1:24 PM:  There are no focal consolidations. Body habitus limits evaluation. There   is a perihilar haze may be overlying soft tissues versus mild edema.   Heart appears enlarged but stable. Sternotomy wires are present.    August 29 at 8:45 AM:  No interval change. Perihilar haze with mild interstitial prominence   consistent with CHF. No focal consolidations to suggest pneumonia. No   pneumothorax.    COMPARISON: August 23      IMPRESSION:  *  No focal consolidations.  *  Mild pulmonary edema.    --- End of Report ---            DELIA PEDERSEN MD; Attending Radiologist  This document has been electronically signed. Aug 29 2022 11:04    < end of copied text >    PROBLEM LIST:  72y Female with HEALTH ISSUES - PROBLEM Dx:  Hyperglycemia due to diabetes mellitus    Chronic diastolic heart failure    MIGUEL treated with BiPAP    COPD, severe    Chronic atrial fibrillation    Need for prophylactic measure    HLD (hyperlipidemia)    Diabetes mellitus, new onset    Hypertension    Acute kidney injury superimposed on CKD           RECS:  dimer is negative  continue solumedrol 40 IVP BID for today  continue inhalers  continue nebs  continue PPI  cardio eval?  continue diuresis  avaps qhs  i asked her if she would like to go back on avaps now- she declined          Please call with any questions.    Irma Eaton, DO  [facility]  [phone]

## 2022-08-30 NOTE — PROGRESS NOTE ADULT - ASSESSMENT
71 y/o F w/ COPD on home O2(3L) with severe pHTN, OHS/MIGUEL on home biPAP, HTN, HLD admitted for hyperglycemia >600 with c/f HHS.  Endocrine consulted for management of hyperglycemia/new T2DM.  Patient does not meet full criteria for HHS (serum glc >600, plasma osmolality >320, AMS), and is responding adequately to insulin but remains with hyperglycemia, exacerbated by steroids     1. DM 2 with hyperglycemia  Admitted with HHS  Now with steroid induced hyperglycemia - on Solumedrol     While inpatient:  BG target 100-180 mg/dl   Increase Lantus to 48 units SQ qHS  Increase Admelog to 20 units SQ TID before meals (Hold if NPO/not eating meal)  Continue Admelog MODERATE dose correctional scales before meals and bedtime   Consistent carbohydrate diet, Nutrition consult (completed)  Check BG before meals and bedtime  Hypoglycemia protocol   Provider to RN: Insulin pen administration and glucometer teaching prior to discharge. Please send glucometer to [facility] and teach pt with her own glucometer prior to discharge.    Discharge Plan:  Levemir and Novolog PENS with dosing TBD   Discuss glycemic goal of a1c <7% to prevent microvascular complications of diabetes mellitus and reduce the risk of macrovascular complications.  Make sure patient knows how to inject insulin and check fingersticks with glucometer (ask bedside RN for teaching)  Patient's  takes Levemir and Novolog at home and patient reports he will assist her  Ensure patient has working glucometer, test strips, lancets, alcohol pads, and BD sharan pen needles  Please also prescribe glucose tabs, Baqsimi nasal spray or glucagon emergency kit for hypoglycemia risk   Pt should call PMD for DM related questions or concerns until pt is seen by CDE or endocrine   Recommend PCP, endocrinology, podiatry and ophthalmology follow up  Followup scheduled with [facility]: [facility], [address] [place] [address], [address], [place] [address], [phone]   1. With Nurse Practitioner: Linda 9/9/22 at 1:30 PM   2. Endocrinologist: Dr. Henderson 11/14/22 12:20 PM    2. Steroid use  Patient intermittently using prednisone 10mg for SOB, last dose 2 days ago.  Restarted on Solumedrol yesterday, 40 mg BID  Steroid induced hyperglycemia - see insulin adjustments as above  Notify endocrine with any updates to steroid plan     3. HTN  BP less than 130/80  On Hydralazine, Metoprolol, Bumex   Further titration per primary team    4. HLD  On Atorvastatin 10mg daily  LDL is not at goal of less than 70 (currently 110), would suggest increasing statin therapy if no contraindication  Defer management decision to primary team     Karis Emery  Nurse Practitioner  Division of Endocrinology & Diabetes  In house pager #[phone]/long range pager #[phone]    If before 9AM or after 6PM, or on weekends/holidays, please call endocrine answering service for assistance ([phone]).  For nonurgent matters email [email or web address] for [email or web address].

## 2022-08-30 NOTE — PROGRESS NOTE ADULT - SUBJECTIVE AND OBJECTIVE BOX
[facility] NEPHROLOGY- PROGRESS NOTE    72 year old Female with history of COPD, CHF presents with weakness. Nephrology consulted for elevated Scr.    REVIEW OF SYSTEMS:  Gen: no fevers  Cards: + L chest pain  Resp: + dyspnea   GI: no nausea or vomiting or diarrhea  Vascular: + LE edema    No Known Allergies      Hospital Medications: Medications reviewed      VITALS:  T(F): 98.1 (22 @ 05:38), Max: 98.7 (22 @ 22:40)  HR: 86 (22 @ 07:39)  BP: 118/67 (22 @ 05:38)  RR: 17 (22 @ 05:38)  SpO2: 95% (22 @ 07:39)  Wt(kg): --     @ 07:01  -   @ 07:00  --------------------------------------------------------  IN: 0 mL / OUT: 550 mL / NET: -550 mL        PHYSICAL EXAM:    Gen: NAD, calm  Cards: Irregularly irregular, +S1/S2, no M/G/R  Resp: L basilar rales?  GI: soft, NT/ND, NABS  Vascular: trace LE edema B/L        LABS:      131<L>  |  92<L>  |  82<H>  ----------------------------<  317<H>  5.5<H>   |  30  |  1.65<H>    Ca    10.3      30 Aug 2022 05:20  Phos  3.5       Mg     2.20           Creatinine Trend: 1.65 <--, 1.85 <--, 1.79 <--, 1.81 <--, 1.89 <--, 1.69 <--, 1.54 <--, 1.48 <--, 1.51 <--, 1.76 <--                        10.8   6.84  )-----------( 242      ( 30 Aug 2022 05:20 )             36.2     Urine Studies:  Urinalysis Basic - ( 29 Aug 2022 09:05 )    Color: Light Yellow / Appearance: Clear / S.011 / pH:   Gluc:  / Ketone: Negative  / Bili: Negative / Urobili: <2 mg/dL   Blood:  / Protein: Negative / Nitrite: Negative   Leuk Esterase: Negative / RBC: N/A /HPF / WBC N/A /HPF   Sq Epi:  / Non Sq Epi:  / Bacteria:       Creatinine, Random Urine: 44 mg/dL ( @ 09:05)          < from: Xray Chest 1 View- PORTABLE-Urgent (Xray Chest 1 View- PORTABLE-Urgent .) (22 @ 09:49) >  IMPRESSION:  *  No focal consolidations.  *  Mild pulmonary edema.    --- End of Report ---    < end of copied text >

## 2022-08-31 LAB
ANION GAP SERPL CALC-SCNC: 10 MMOL/L — SIGNIFICANT CHANGE UP (ref 7–14)
BUN SERPL-MCNC: 94 MG/DL — HIGH (ref 7–23)
CALCIUM SERPL-MCNC: 10.1 MG/DL — SIGNIFICANT CHANGE UP (ref 8.4–10.5)
CHLORIDE SERPL-SCNC: 90 MMOL/L — LOW (ref 98–107)
CO2 SERPL-SCNC: 29 MMOL/L — SIGNIFICANT CHANGE UP (ref 22–31)
CREAT SERPL-MCNC: 1.65 MG/DL — HIGH (ref 0.5–1.3)
EGFR: 32 ML/MIN/1.73M2 — LOW
GLUCOSE BLDC GLUCOMTR-MCNC: 215 MG/DL — HIGH (ref 70–99)
GLUCOSE BLDC GLUCOMTR-MCNC: 255 MG/DL — HIGH (ref 70–99)
GLUCOSE BLDC GLUCOMTR-MCNC: 377 MG/DL — HIGH (ref 70–99)
GLUCOSE BLDC GLUCOMTR-MCNC: 378 MG/DL — HIGH (ref 70–99)
GLUCOSE BLDC GLUCOMTR-MCNC: 398 MG/DL — HIGH (ref 70–99)
GLUCOSE SERPL-MCNC: 381 MG/DL — HIGH (ref 70–99)
MAGNESIUM SERPL-MCNC: 2.2 MG/DL — SIGNIFICANT CHANGE UP (ref 1.6–2.6)
PHOSPHATE SERPL-MCNC: 3 MG/DL — SIGNIFICANT CHANGE UP (ref 2.5–4.5)
POTASSIUM SERPL-MCNC: 5.5 MMOL/L — HIGH (ref 3.5–5.3)
POTASSIUM SERPL-SCNC: 5.5 MMOL/L — HIGH (ref 3.5–5.3)
SODIUM SERPL-SCNC: 129 MMOL/L — LOW (ref 135–145)

## 2022-08-31 PROCEDURE — 99232 SBSQ HOSP IP/OBS MODERATE 35: CPT

## 2022-08-31 RX ORDER — SODIUM ZIRCONIUM CYCLOSILICATE 10 G/10G
5 POWDER, FOR SUSPENSION ORAL ONCE
Refills: 0 | Status: DISCONTINUED | OUTPATIENT
Start: 2022-08-31 | End: 2022-08-31

## 2022-08-31 RX ORDER — INSULIN GLARGINE 100 [IU]/ML
55 INJECTION, SOLUTION SUBCUTANEOUS AT BEDTIME
Refills: 0 | Status: DISCONTINUED | OUTPATIENT
Start: 2022-08-31 | End: 2022-09-01

## 2022-08-31 RX ORDER — INSULIN LISPRO 100/ML
30 VIAL (ML) SUBCUTANEOUS
Refills: 0 | Status: DISCONTINUED | OUTPATIENT
Start: 2022-08-31 | End: 2022-09-01

## 2022-08-31 RX ORDER — INSULIN LISPRO 100/ML
25 VIAL (ML) SUBCUTANEOUS
Refills: 0 | Status: DISCONTINUED | OUTPATIENT
Start: 2022-08-31 | End: 2022-08-31

## 2022-08-31 RX ORDER — SODIUM ZIRCONIUM CYCLOSILICATE 10 G/10G
10 POWDER, FOR SUSPENSION ORAL ONCE
Refills: 0 | Status: COMPLETED | OUTPATIENT
Start: 2022-08-31 | End: 2022-08-31

## 2022-08-31 RX ORDER — SODIUM ZIRCONIUM CYCLOSILICATE 10 G/10G
10 POWDER, FOR SUSPENSION ORAL THREE TIMES A DAY
Refills: 0 | Status: COMPLETED | OUTPATIENT
Start: 2022-08-31 | End: 2022-08-31

## 2022-08-31 RX ORDER — INSULIN LISPRO 100/ML
VIAL (ML) SUBCUTANEOUS
Refills: 0 | Status: DISCONTINUED | OUTPATIENT
Start: 2022-08-31 | End: 2022-09-05

## 2022-08-31 RX ADMIN — HEPARIN SODIUM 5000 UNIT(S): 5000 INJECTION INTRAVENOUS; SUBCUTANEOUS at 22:13

## 2022-08-31 RX ADMIN — Medication 40 MILLIGRAM(S): at 06:37

## 2022-08-31 RX ADMIN — SODIUM ZIRCONIUM CYCLOSILICATE 10 GRAM(S): 10 POWDER, FOR SUSPENSION ORAL at 22:25

## 2022-08-31 RX ADMIN — ATORVASTATIN CALCIUM 10 MILLIGRAM(S): 80 TABLET, FILM COATED ORAL at 22:13

## 2022-08-31 RX ADMIN — INSULIN GLARGINE 55 UNIT(S): 100 INJECTION, SOLUTION SUBCUTANEOUS at 22:13

## 2022-08-31 RX ADMIN — LEVALBUTEROL 0.63 MILLIGRAM(S): 1.25 SOLUTION, CONCENTRATE RESPIRATORY (INHALATION) at 22:39

## 2022-08-31 RX ADMIN — SODIUM ZIRCONIUM CYCLOSILICATE 10 GRAM(S): 10 POWDER, FOR SUSPENSION ORAL at 02:42

## 2022-08-31 RX ADMIN — Medication 10 MILLIGRAM(S): at 22:13

## 2022-08-31 RX ADMIN — HEPARIN SODIUM 5000 UNIT(S): 5000 INJECTION INTRAVENOUS; SUBCUTANEOUS at 13:59

## 2022-08-31 RX ADMIN — Medication 10 MILLIGRAM(S): at 06:39

## 2022-08-31 RX ADMIN — SODIUM ZIRCONIUM CYCLOSILICATE 10 GRAM(S): 10 POWDER, FOR SUSPENSION ORAL at 11:52

## 2022-08-31 RX ADMIN — Medication 25 UNIT(S): at 11:53

## 2022-08-31 RX ADMIN — BUMETANIDE 2 MILLIGRAM(S): 0.25 INJECTION INTRAMUSCULAR; INTRAVENOUS at 13:57

## 2022-08-31 RX ADMIN — PANTOPRAZOLE SODIUM 40 MILLIGRAM(S): 20 TABLET, DELAYED RELEASE ORAL at 06:39

## 2022-08-31 RX ADMIN — Medication 4: at 22:17

## 2022-08-31 RX ADMIN — BUDESONIDE AND FORMOTEROL FUMARATE DIHYDRATE 2 PUFF(S): 160; 4.5 AEROSOL RESPIRATORY (INHALATION) at 10:46

## 2022-08-31 RX ADMIN — LIDOCAINE 1 PATCH: 4 CREAM TOPICAL at 11:52

## 2022-08-31 RX ADMIN — Medication 6: at 08:13

## 2022-08-31 RX ADMIN — LEVALBUTEROL 0.63 MILLIGRAM(S): 1.25 SOLUTION, CONCENTRATE RESPIRATORY (INHALATION) at 04:12

## 2022-08-31 RX ADMIN — Medication 10: at 11:53

## 2022-08-31 RX ADMIN — LIDOCAINE 1 PATCH: 4 CREAM TOPICAL at 20:04

## 2022-08-31 RX ADMIN — Medication 30 UNIT(S): at 17:02

## 2022-08-31 RX ADMIN — Medication 20 UNIT(S): at 08:14

## 2022-08-31 RX ADMIN — Medication 10: at 17:02

## 2022-08-31 RX ADMIN — LEVALBUTEROL 0.63 MILLIGRAM(S): 1.25 SOLUTION, CONCENTRATE RESPIRATORY (INHALATION) at 16:24

## 2022-08-31 RX ADMIN — BUMETANIDE 2 MILLIGRAM(S): 0.25 INJECTION INTRAMUSCULAR; INTRAVENOUS at 06:38

## 2022-08-31 RX ADMIN — Medication 325 MILLIGRAM(S): at 11:52

## 2022-08-31 RX ADMIN — LEVALBUTEROL 0.63 MILLIGRAM(S): 1.25 SOLUTION, CONCENTRATE RESPIRATORY (INHALATION) at 09:24

## 2022-08-31 RX ADMIN — HEPARIN SODIUM 5000 UNIT(S): 5000 INJECTION INTRAVENOUS; SUBCUTANEOUS at 06:38

## 2022-08-31 RX ADMIN — Medication 10 MILLIGRAM(S): at 13:58

## 2022-08-31 RX ADMIN — Medication 50 MILLIGRAM(S): at 06:39

## 2022-08-31 NOTE — PROGRESS NOTE ADULT - ASSESSMENT
72 year old Female with history of COPD, CHF presents with weakness. Nephrology consulted for elevated Scr.    1) MISAEL: Secondary to CRS? MISAEL resolved. UA bland. FeUrea low. Bladder scan not performed. Avoid nephrotoxins.    2) CKD-3b: Baseline Scr 1.4-1.6 with CKD likely due to DM. Outpatient CKD work up. Monitor electrolytes.    3) HTN with CKD: BP controlled. Continue with current medications. Monitor BP.    4) LE edema: Improving. Continue with bumex 2 mg IV twice daily. Prior TTE with grossly normal LVSF. Monitor UO.    5) L renal mass: Patient refusing inpatient work up. Outpatient Urology evaluation.    6) Hyperkalemia: In setting of hyperglycemia. S/P lokelma this morning. Will give an additional 2 doses today. Recommend better glucose control while on steroids. Continue with low K diet. Monitor serum K.      [facility] NEPHROLOGY  Rdorigue Amador M.D.  Rob Perez D.O.  Ana Song M.D.  Lucrecia López, MSN, ANP-C    Telephone: [phone]  Facsimile: [phone]    [address] [place] [address]

## 2022-08-31 NOTE — PROGRESS NOTE ADULT - SUBJECTIVE AND OBJECTIVE BOX
SUBJECTIVE / OVERNIGHT EVENTS:pt seen and examined    MEDICATIONS  (STANDING):  atorvastatin 10 milliGRAM(s) Oral at bedtime  budesonide 160 MICROgram(s)/formoterol 4.5 MICROgram(s) Inhaler 2 Puff(s) Inhalation two times a day  buMETAnide Injectable 2 milliGRAM(s) IV Push two times a day  dextrose 5%. 1000 milliLiter(s) (100 mL/Hr) IV Continuous <Continuous>  dextrose 5%. 1000 milliLiter(s) (50 mL/Hr) IV Continuous <Continuous>  dextrose 50% Injectable 25 Gram(s) IV Push once  dextrose 50% Injectable 12.5 Gram(s) IV Push once  dextrose 50% Injectable 25 Gram(s) IV Push once  ferrous    sulfate 325 milliGRAM(s) Oral daily  glucagon  Injectable 1 milliGRAM(s) IntraMuscular once  heparin   Injectable 5000 Unit(s) SubCutaneous every 8 hours  hydrALAZINE 10 milliGRAM(s) Oral three times a day  insulin glargine Injectable (LANTUS) 55 Unit(s) SubCutaneous at bedtime  insulin lispro (ADMELOG) corrective regimen sliding scale   SubCutaneous Before meals and at bedtime  insulin lispro Injectable (ADMELOG) 30 Unit(s) SubCutaneous three times a day before meals  levalbuterol Inhalation 0.63 milliGRAM(s) Inhalation every 6 hours  lidocaine   4% Patch 1 Patch Transdermal daily  methylPREDNISolone sodium succinate Injectable 40 milliGRAM(s) IV Push two times a day  metoprolol succinate ER 50 milliGRAM(s) Oral daily  pantoprazole    Tablet 40 milliGRAM(s) Oral before breakfast  sodium zirconium cyclosilicate 10 Gram(s) Oral three times a day    MEDICATIONS  (PRN):  acetaminophen     Tablet .. 650 milliGRAM(s) Oral every 6 hours PRN Mild Pain (1 - 3), Moderate Pain (4 - 6), Severe Pain (7 - 10)  ALBUTerol    90 MICROgram(s) HFA Inhaler 2 Puff(s) Inhalation every 6 hours PRN Shortness of Breath and/or Wheezing  dextrose Oral Gel 15 Gram(s) Oral once PRN Blood Glucose LESS THAN 70 milliGRAM(s)/deciliter    Vital Signs Last 24 Hrs  T(C): 36.8 (22 @ 19:04), Max: 36.8 (22 @ 19:04)  T(F): 98.3 (22 @ 19:04), Max: 98.3 (22 @ 19:04)  HR: 71 (22 @ 20:17) (60 - 98)  BP: 129/47 (22 @ 19:04) (119/72 - 138/78)  BP(mean): --  RR: 18 (22 @ 19:04) (17 - 18)  SpO2: 100% (22 @ 20:17) (94% - 100%)            Constitutional: No fever, fatigue  Skin: No rash.  Eyes: No recent vision problems or eye pain.  ENT: No congestion, ear pain, or sore throat.  Cardiovascular: No chest pain or palpation.  Respiratory: No cough, shortness of breath, congestion, or wheezing.  Gastrointestinal: No abdominal pain, nausea, vomiting, or diarrhea.  Genitourinary: No dysuria.  Musculoskeletal: No joint swelling.  Neurologic: No headache.    PHYSICAL EXAM:  GENERAL: NAD  EYES: EOMI, PERRLA  NECK: Supple, No JVD  CHEST/LUNG: dec breath sounds at bases  HEART:  S1 , S2 +  ABDOMEN: soft, bs+  EXTREMITIES:  edema+  NEUROLOGY:alert awake oriented      LABS:      129<L>  |  90<L>  |  94<H>  ----------------------------<  381<H>  5.5<H>   |  29  |  1.65<H>    Ca    10.1      31 Aug 2022 01:03  Phos  3.0       Mg     2.20           Creatinine Trend: 1.65 <--, 1.65 <--, 1.85 <--, 1.79 <--, 1.81 <--, 1.89 <--, 1.69 <--, 1.54 <--                        10.8   6.84  )-----------( 242      ( 30 Aug 2022 05:20 )             36.2     Urine Studies:  Urinalysis Basic - ( 29 Aug 2022 09:05 )    Color: Light Yellow / Appearance: Clear / S.011 / pH:   Gluc:  / Ketone: Negative  / Bili: Negative / Urobili: <2 mg/dL   Blood:  / Protein: Negative / Nitrite: Negative   Leuk Esterase: Negative / RBC: N/A /HPF / WBC N/A /HPF   Sq Epi:  / Non Sq Epi:  / Bacteria:       Creatinine, Random Urine: 44 mg/dL ( @ 09:05)                          Imaging Personally Reviewed:    Consultant(s) Notes Reviewed:  yes    Care Discussed with Consultants/Other Providers:yes

## 2022-08-31 NOTE — PROGRESS NOTE ADULT - SUBJECTIVE AND OBJECTIVE BOX
Chief Complaint: DM 2 with hyperglycemia exacerbated by steroids     History: Patient seen at bedside. Reports she is eating meals, denies n/v, denies s/s of hypoglycemia  Hyperglycemia persisting in spite of basal/bolus increases - most recent  mg/dl  Remains on Solumedrol 40 mg BID    MEDICATIONS  (STANDING):  atorvastatin 10 milliGRAM(s) Oral at bedtime  budesonide 160 MICROgram(s)/formoterol 4.5 MICROgram(s) Inhaler 2 Puff(s) Inhalation two times a day  buMETAnide Injectable 2 milliGRAM(s) IV Push two times a day  dextrose 5%. 1000 milliLiter(s) (50 mL/Hr) IV Continuous <Continuous>  dextrose 5%. 1000 milliLiter(s) (100 mL/Hr) IV Continuous <Continuous>  dextrose 50% Injectable 25 Gram(s) IV Push once  dextrose 50% Injectable 12.5 Gram(s) IV Push once  dextrose 50% Injectable 25 Gram(s) IV Push once  ferrous    sulfate 325 milliGRAM(s) Oral daily  glucagon  Injectable 1 milliGRAM(s) IntraMuscular once  heparin   Injectable 5000 Unit(s) SubCutaneous every 8 hours  hydrALAZINE 10 milliGRAM(s) Oral three times a day  insulin glargine Injectable (LANTUS) 48 Unit(s) SubCutaneous at bedtime  insulin lispro (ADMELOG) corrective regimen sliding scale   SubCutaneous three times a day before meals  insulin lispro (ADMELOG) corrective regimen sliding scale   SubCutaneous at bedtime  insulin lispro Injectable (ADMELOG) 25 Unit(s) SubCutaneous three times a day before meals  levalbuterol Inhalation 0.63 milliGRAM(s) Inhalation every 6 hours  lidocaine   4% Patch 1 Patch Transdermal daily  methylPREDNISolone sodium succinate Injectable 40 milliGRAM(s) IV Push two times a day  metoprolol succinate ER 50 milliGRAM(s) Oral daily  pantoprazole    Tablet 40 milliGRAM(s) Oral before breakfast  sodium zirconium cyclosilicate 10 Gram(s) Oral three times a day    MEDICATIONS  (PRN):  acetaminophen     Tablet .. 650 milliGRAM(s) Oral every 6 hours PRN Mild Pain (1 - 3), Moderate Pain (4 - 6), Severe Pain (7 - 10)  ALBUTerol    90 MICROgram(s) HFA Inhaler 2 Puff(s) Inhalation every 6 hours PRN Shortness of Breath and/or Wheezing  dextrose Oral Gel 15 Gram(s) Oral once PRN Blood Glucose LESS THAN 70 milliGRAM(s)/deciliter    No Known Allergies    Review of Systems:  Cardiovascular: No chest pain  Respiratory: +SOB  GI: No nausea, vomiting  Endocrine: no hypoglycemia     PHYSICAL EXAM:  VITALS: T(C): 36.6 (08-31-22 @ 06:30)  T(F): 97.8 (08-31-22 @ 06:30), Max: 98.6 (08-30-22 @ 21:50)  HR: 66 (08-31-22 @ 09:29) (60 - 88)  BP: 119/72 (08-31-22 @ 06:30) (112/55 - 119/72)  RR:  (17 - 19)  SpO2:  (97% - 100%)  Wt(kg): --  GENERAL: NAD  EYES: No proptosis, no lid lag, anicteric  HEENT:  Atraumatic, Normocephalic, moist mucous membranes  RESPIRATORY: on nasal cannula, unlabored respirations     CAPILLARY BLOOD GLUCOSE    POCT Blood Glucose.: 377 mg/dL (31 Aug 2022 11:44)  POCT Blood Glucose.: 398 mg/dL (31 Aug 2022 11:43)  POCT Blood Glucose.: 255 mg/dL (31 Aug 2022 07:37)  POCT Blood Glucose.: 397 mg/dL (30 Aug 2022 23:34)  POCT Blood Glucose.: 280 mg/dL (30 Aug 2022 17:04)      08-31    129<L>  |  90<L>  |  94<H>  ----------------------------<  381<H>  5.5<H>   |  29  |  1.65<H>    eGFR: 32<L>    Ca    10.1      08-31  Mg     2.20     08-31  Phos  3.0     08-31    A1C with Estimated Average Glucose Result: 10.7 % (08-23-22 @ 23:18)    Diet, Consistent Carbohydrate/No Snacks:   No Concentrated Potassium  Supplement Feeding Modality:  Oral  Nepro Cans or Servings Per Day:  1       Frequency:  Three Times a day (08-31-22 @ 15:25) [Active]

## 2022-08-31 NOTE — DISCHARGE NOTE NURSING/CASE MANAGEMENT/SOCIAL WORK - NSDCPEPTCOWADR_GEN_ALL_CORE
Warfarin/Coumadin increases your risk for bleeding. Notify your doctor if you see any bleeding or any of the side effects listed in the Warfarin/Coumadin Booklet. Diet and medications can affect the PT/INR blood level. When Warfarin/Coumadin is taken with other medicines it can change the way other medicines work. Other medicines can also change the way Warfarin/Coumadin works. It is very important to tell your health care provider about all other medicines, including over-the-counter medications, herbs, diet supplements, or products containing vitamin K. Call your doctor before starting, stopping, or changing the dose of any prescription or over-the-counter medications. Any product containing aspirin lessens the blood's ability to form clots and adds to the effect of Warfarin/Coumadin. Never take aspirin without speaking with your health care provider.
178

## 2022-08-31 NOTE — PROGRESS NOTE ADULT - ASSESSMENT
71 y/o F w/ COPD on home O2(3L) with severe pHTN, OHS/MIGUEL on home biPAP, HTN, HLD admitted for hyperglycemia >600 with c/f HHS.  Endocrine consulted for management of hyperglycemia/new T2DM.  Patient does not meet full criteria for HHS (serum glc >600, plasma osmolality >320, AMS), and is responding adequately to insulin but remains with hyperglycemia, exacerbated by steroids     1. DM 2 with hyperglycemia  Admitted with HHS  Now with steroid induced hyperglycemia - on Solumedrol     While inpatient:  BG target 100-180 mg/dl   Increase Lantus to 55 units SQ qHS  Increase Admelog to 30 units SQ TID before meals (Hold if NPO/not eating meal)  Continue Admelog MODERATE dose correctional scales before meals and bedtime - switch to same scale before meals and bedtime for hyperglycemia recovery, once BG trending below 200 consistently, can transition back to separate scale (bedtime coverage starting at 251 mg/dl)   Consistent carbohydrate diet, Nutrition consult (completed)  Check BG before meals and bedtime  Hypoglycemia protocol   Provider to RN: Insulin pen administration and glucometer teaching prior to discharge. Please send glucometer to [facility] and teach pt with her own glucometer prior to discharge.    Discharge Plan:  Levemir and Novolog PENS with dosing TBD   Discuss glycemic goal of a1c <7% to prevent microvascular complications of diabetes mellitus and reduce the risk of macrovascular complications.  Make sure patient knows how to inject insulin and check fingersticks with glucometer (ask bedside RN for teaching)  Patient's  takes Levemir and Novolog at home and patient reports he will assist her  Ensure patient has working glucometer, test strips, lancets, alcohol pads, and BD sharan pen needles  Please also prescribe glucose tabs, Baqsimi nasal spray or glucagon emergency kit for hypoglycemia risk   Pt should call PMD for DM related questions or concerns until pt is seen by CDE or endocrine   Recommend PCP, endocrinology, podiatry and ophthalmology follow up  Followup scheduled with [facility]: [facility], [address] [place] [address], [address], [place] [address], [phone]   1. With Nurse Practitioner: Linda 9/9/22 at 1:30 PM   2. Endocrinologist: Dr. Henderson 11/14/22 12:20 PM    2. Steroid use  Patient intermittently using prednisone 10mg for SOB, last dose 2 days ago.  Restarted on Solumedrol, 40 mg BID  Steroid induced hyperglycemia - see insulin adjustments as above  Notify endocrine with any updates to steroid plan     3. HTN  BP less than 130/80  On Hydralazine, Metoprolol, Bumex   Further titration per primary team    4. HLD  On Atorvastatin 10mg daily  LDL is not at goal of less than 70 (currently 110), would suggest increasing statin therapy if no contraindication  Defer management decision to primary team     Karis Emery  Nurse Practitioner  Division of Endocrinology & Diabetes  In house pager #[phone]/long range pager #[phone]    If before 9AM or after 6PM, or on weekends/holidays, please call endocrine answering service for assistance ([phone]).  For nonurgent matters email [email or web address] for assistance.

## 2022-08-31 NOTE — PROGRESS NOTE ADULT - SUBJECTIVE AND OBJECTIVE BOX
[facility] NEPHROLOGY- PROGRESS NOTE    72 year old Female with history of COPD, CHF presents with weakness. Nephrology consulted for elevated Scr.    REVIEW OF SYSTEMS:  Gen: no fevers  Cards: no chest pain  Resp: + dyspnea improving  GI: no nausea or vomiting or diarrhea  Vascular: + LE edema    No Known Allergies      Hospital Medications: Medications reviewed      VITALS:  T(F): 97.8 (22 @ 06:30), Max: 98.6 (22 @ 21:50)  HR: 66 (22 @ 09:29)  BP: 119/72 (22 @ 06:30)  RR: 17 (22 @ 06:30)  SpO2: 100% (22 @ 06:30)  Wt(kg): --        PHYSICAL EXAM:    Gen: NAD, calm  Cards: Irregularly irregular, +S1/S2, no M/G/R  Resp: L basilar rales?  GI: soft, NT/ND, NABS  Vascular: trace LE edema B/L        LABS:      129<L>  |  90<L>  |  94<H>  ----------------------------<  381<H>  5.5<H>   |  29  |  1.65<H>    Ca    10.1      31 Aug 2022 01:03  Phos  3.0       Mg     2.20           Creatinine Trend: 1.65 <--, 1.65 <--, 1.85 <--, 1.79 <--, 1.81 <--, 1.89 <--, 1.69 <--, 1.54 <--, 1.48 <--, 1.51 <--                        10.8   6.84  )-----------( 242      ( 30 Aug 2022 05:20 )             36.2     Urine Studies:  Urinalysis Basic - ( 29 Aug 2022 09:05 )    Color: Light Yellow / Appearance: Clear / S.011 / pH:   Gluc:  / Ketone: Negative  / Bili: Negative / Urobili: <2 mg/dL   Blood:  / Protein: Negative / Nitrite: Negative   Leuk Esterase: Negative / RBC: N/A /HPF / WBC N/A /HPF   Sq Epi:  / Non Sq Epi:  / Bacteria:       Creatinine, Random Urine: 44 mg/dL ( @ 09:05)

## 2022-08-31 NOTE — PROGRESS NOTE ADULT - SUBJECTIVE AND OBJECTIVE BOX
PULMONARY PROGRESS NOTE    DAMARI GUERRERO  MRN-[id number]    Patient is a 72y old  Female who presents with a chief complaint of lightheadedness and fatigue for 5 days (31 Aug 2022 15:28)      HPI:  - remains on 02  used avaps  felt better with breathing earlier but when i saw her this afternoon, again felt winded and uncomfortable   -    ROS:   -    ACTIVE MEDICATION LIST:  MEDICATIONS  (STANDING):  atorvastatin 10 milliGRAM(s) Oral at bedtime  budesonide 160 MICROgram(s)/formoterol 4.5 MICROgram(s) Inhaler 2 Puff(s) Inhalation two times a day  buMETAnide Injectable 2 milliGRAM(s) IV Push two times a day  dextrose 5%. 1000 milliLiter(s) (100 mL/Hr) IV Continuous <Continuous>  dextrose 5%. 1000 milliLiter(s) (50 mL/Hr) IV Continuous <Continuous>  dextrose 50% Injectable 25 Gram(s) IV Push once  dextrose 50% Injectable 12.5 Gram(s) IV Push once  dextrose 50% Injectable 25 Gram(s) IV Push once  ferrous    sulfate 325 milliGRAM(s) Oral daily  glucagon  Injectable 1 milliGRAM(s) IntraMuscular once  heparin   Injectable 5000 Unit(s) SubCutaneous every 8 hours  hydrALAZINE 10 milliGRAM(s) Oral three times a day  insulin glargine Injectable (LANTUS) 55 Unit(s) SubCutaneous at bedtime  insulin lispro (ADMELOG) corrective regimen sliding scale   SubCutaneous Before meals and at bedtime  insulin lispro Injectable (ADMELOG) 30 Unit(s) SubCutaneous three times a day before meals  levalbuterol Inhalation 0.63 milliGRAM(s) Inhalation every 6 hours  lidocaine   4% Patch 1 Patch Transdermal daily  methylPREDNISolone sodium succinate Injectable 40 milliGRAM(s) IV Push two times a day  metoprolol succinate ER 50 milliGRAM(s) Oral daily  pantoprazole    Tablet 40 milliGRAM(s) Oral before breakfast  sodium zirconium cyclosilicate 10 Gram(s) Oral three times a day    MEDICATIONS  (PRN):  acetaminophen     Tablet .. 650 milliGRAM(s) Oral every 6 hours PRN Mild Pain (1 - 3), Moderate Pain (4 - 6), Severe Pain (7 - 10)  ALBUTerol    90 MICROgram(s) HFA Inhaler 2 Puff(s) Inhalation every 6 hours PRN Shortness of Breath and/or Wheezing  dextrose Oral Gel 15 Gram(s) Oral once PRN Blood Glucose LESS THAN 70 milliGRAM(s)/deciliter      EXAM:  Vital Signs Last 24 Hrs  T(C): 36.8 (31 Aug 2022 19:04), Max: 37 (30 Aug 2022 21:50)  T(F): 98.3 (31 Aug 2022 19:04), Max: 98.6 (30 Aug 2022 21:50)  HR: 71 (31 Aug 2022 20:17) (60 - 98)  BP: 129/47 (31 Aug 2022 19:04) (112/55 - 138/78)  BP(mean): --  RR: 18 (31 Aug 2022 19:04) (17 - 19)  SpO2: 100% (31 Aug 2022 20:17) (94% - 100%)    Parameters below as of 31 Aug 2022 19:04  Patient On (Oxygen Delivery Method): nasal cannula  O2 Flow (L/min): 3      GENERAL: The patient is awake and alert in no apparent distress.     LUNGS:  diminished breath sounds but not wheezing                             10.8   6.84  )-----------( 242      ( 30 Aug 2022 05:20 )             36.2       08-31    129<L>  |  90<L>  |  94<H>  ----------------------------<  381<H>  5.5<H>   |  29  |  1.65<H>    Ca    10.1      31 Aug 2022 01:03  Phos  3.0     08-31  Mg     2.20     08-31       < from: Xray Chest 1 View- PORTABLE-Urgent (Xray Chest 1 View- PORTABLE-Urgent .) (08.29.22 @ 09:49) >    ACC: [id number] EXAM:  XR CHEST PORTABLE URGENT 1V                        ACC: [id number] EXAM:  XR CHEST PORTABLE URGENT 1V                          PROCEDURE DATE:  08/29/2022          INTERPRETATION:  CLINICAL INFORMATION: Dyspnea    TIME OF EXAMINATION: August 27, 2022 at 1:24 PM and August 29 at 8:45 AM    EXAM: Portable chest    FINDINGS:  August 27 at 1:24 PM:  There are no focal consolidations. Body habitus limits evaluation. There   is a perihilar haze may be overlying soft tissues versus mild edema.   Heart appears enlarged but stable. Sternotomy wires are present.    August 29 at 8:45 AM:  No interval change. Perihilar haze with mild interstitial prominence   consistent with CHF. No focal consolidations to suggest pneumonia. No   pneumothorax.    COMPARISON: August 23      IMPRESSION:  *  No focal consolidations.  *  Mild pulmonary edema.    --- End of Report ---            DELIA PEDERSEN MD; Attending Radiologist  This document has been electronically signed. Aug 29 2022 11:04AM    < end of copied text >      PROBLEM LIST:  72y Female with HEALTH ISSUES - PROBLEM Dx:  Hyperglycemia due to diabetes mellitus    Chronic diastolic heart failure    MIGUEL treated with BiPAP    COPD, severe    Chronic atrial fibrillation    Need for prophylactic measure    HLD (hyperlipidemia)    Diabetes mellitus, new onset    Hypertension    Acute kidney injury superimposed on CKD    Steroid-induced hyperglycemia              RECS:  patient has been refusing every other dose of solumedrol  IVP  give her solumedrol IVP one time on 9/1  then prednisone 40mg X 2 days with 10mg taper every 2 days until done , starting on 9/2  inhalers  02  avaps  diuresis per cardio/renal  dvt prophylaxis  low probability of vte/pe causing her symptoms given dimer of 200        Please call with any questions.    Irma Eaton, DO  [facility] Pulmonary/Sleep Medicine  [phone]

## 2022-09-01 LAB
ANION GAP SERPL CALC-SCNC: 10 MMOL/L — SIGNIFICANT CHANGE UP (ref 7–14)
BUN SERPL-MCNC: 113 MG/DL — HIGH (ref 7–23)
CALCIUM SERPL-MCNC: 10.3 MG/DL — SIGNIFICANT CHANGE UP (ref 8.4–10.5)
CHLORIDE SERPL-SCNC: 92 MMOL/L — LOW (ref 98–107)
CO2 SERPL-SCNC: 35 MMOL/L — HIGH (ref 22–31)
CREAT SERPL-MCNC: 1.69 MG/DL — HIGH (ref 0.5–1.3)
EGFR: 32 ML/MIN/1.73M2 — LOW
GLUCOSE BLDC GLUCOMTR-MCNC: 129 MG/DL — HIGH (ref 70–99)
GLUCOSE BLDC GLUCOMTR-MCNC: 142 MG/DL — HIGH (ref 70–99)
GLUCOSE BLDC GLUCOMTR-MCNC: 174 MG/DL — HIGH (ref 70–99)
GLUCOSE BLDC GLUCOMTR-MCNC: 266 MG/DL — HIGH (ref 70–99)
GLUCOSE BLDC GLUCOMTR-MCNC: 70 MG/DL — SIGNIFICANT CHANGE UP (ref 70–99)
GLUCOSE BLDC GLUCOMTR-MCNC: 98 MG/DL — SIGNIFICANT CHANGE UP (ref 70–99)
GLUCOSE SERPL-MCNC: 197 MG/DL — HIGH (ref 70–99)
HCT VFR BLD CALC: 33.4 % — LOW (ref 34.5–45)
HGB BLD-MCNC: 9.7 G/DL — LOW (ref 11.5–15.5)
MAGNESIUM SERPL-MCNC: 2.2 MG/DL — SIGNIFICANT CHANGE UP (ref 1.6–2.6)
MCHC RBC-ENTMCNC: 29 GM/DL — LOW (ref 32–36)
MCHC RBC-ENTMCNC: 29.4 PG — SIGNIFICANT CHANGE UP (ref 27–34)
MCV RBC AUTO: 101.2 FL — HIGH (ref 80–100)
NRBC # BLD: 0 /100 WBCS — SIGNIFICANT CHANGE UP (ref 0–0)
NRBC # FLD: 0.02 K/UL — HIGH (ref 0–0)
PHOSPHATE SERPL-MCNC: 3.4 MG/DL — SIGNIFICANT CHANGE UP (ref 2.5–4.5)
PLATELET # BLD AUTO: 275 K/UL — SIGNIFICANT CHANGE UP (ref 150–400)
POTASSIUM SERPL-MCNC: 3.9 MMOL/L — SIGNIFICANT CHANGE UP (ref 3.5–5.3)
POTASSIUM SERPL-SCNC: 3.9 MMOL/L — SIGNIFICANT CHANGE UP (ref 3.5–5.3)
RBC # BLD: 3.3 M/UL — LOW (ref 3.8–5.2)
RBC # FLD: 13.3 % — SIGNIFICANT CHANGE UP (ref 10.3–14.5)
SODIUM SERPL-SCNC: 137 MMOL/L — SIGNIFICANT CHANGE UP (ref 135–145)
WBC # BLD: 9.77 K/UL — SIGNIFICANT CHANGE UP (ref 3.8–10.5)
WBC # FLD AUTO: 9.77 K/UL — SIGNIFICANT CHANGE UP (ref 3.8–10.5)

## 2022-09-01 PROCEDURE — 99232 SBSQ HOSP IP/OBS MODERATE 35: CPT

## 2022-09-01 RX ORDER — INSULIN LISPRO 100/ML
24 VIAL (ML) SUBCUTANEOUS
Refills: 0 | Status: DISCONTINUED | OUTPATIENT
Start: 2022-09-01 | End: 2022-09-04

## 2022-09-01 RX ORDER — INSULIN GLARGINE 100 [IU]/ML
48 INJECTION, SOLUTION SUBCUTANEOUS AT BEDTIME
Refills: 0 | Status: DISCONTINUED | OUTPATIENT
Start: 2022-09-01 | End: 2022-09-04

## 2022-09-01 RX ORDER — BUMETANIDE 0.25 MG/ML
2 INJECTION INTRAMUSCULAR; INTRAVENOUS DAILY
Refills: 0 | Status: DISCONTINUED | OUTPATIENT
Start: 2022-09-02 | End: 2022-09-03

## 2022-09-01 RX ORDER — MECLIZINE HCL 12.5 MG
12.5 TABLET ORAL THREE TIMES A DAY
Refills: 0 | Status: DISCONTINUED | OUTPATIENT
Start: 2022-09-01 | End: 2022-09-07

## 2022-09-01 RX ORDER — INSULIN LISPRO 100/ML
26 VIAL (ML) SUBCUTANEOUS
Refills: 0 | Status: DISCONTINUED | OUTPATIENT
Start: 2022-09-01 | End: 2022-09-01

## 2022-09-01 RX ADMIN — BUDESONIDE AND FORMOTEROL FUMARATE DIHYDRATE 2 PUFF(S): 160; 4.5 AEROSOL RESPIRATORY (INHALATION) at 08:44

## 2022-09-01 RX ADMIN — LEVALBUTEROL 0.63 MILLIGRAM(S): 1.25 SOLUTION, CONCENTRATE RESPIRATORY (INHALATION) at 22:01

## 2022-09-01 RX ADMIN — BUDESONIDE AND FORMOTEROL FUMARATE DIHYDRATE 2 PUFF(S): 160; 4.5 AEROSOL RESPIRATORY (INHALATION) at 21:09

## 2022-09-01 RX ADMIN — Medication 30 UNIT(S): at 11:30

## 2022-09-01 RX ADMIN — Medication 10 MILLIGRAM(S): at 21:11

## 2022-09-01 RX ADMIN — LIDOCAINE 1 PATCH: 4 CREAM TOPICAL at 13:21

## 2022-09-01 RX ADMIN — LEVALBUTEROL 0.63 MILLIGRAM(S): 1.25 SOLUTION, CONCENTRATE RESPIRATORY (INHALATION) at 03:50

## 2022-09-01 RX ADMIN — Medication 325 MILLIGRAM(S): at 13:21

## 2022-09-01 RX ADMIN — BUMETANIDE 2 MILLIGRAM(S): 0.25 INJECTION INTRAMUSCULAR; INTRAVENOUS at 13:21

## 2022-09-01 RX ADMIN — HEPARIN SODIUM 5000 UNIT(S): 5000 INJECTION INTRAVENOUS; SUBCUTANEOUS at 21:10

## 2022-09-01 RX ADMIN — BUMETANIDE 2 MILLIGRAM(S): 0.25 INJECTION INTRAMUSCULAR; INTRAVENOUS at 06:00

## 2022-09-01 RX ADMIN — LEVALBUTEROL 0.63 MILLIGRAM(S): 1.25 SOLUTION, CONCENTRATE RESPIRATORY (INHALATION) at 09:20

## 2022-09-01 RX ADMIN — LEVALBUTEROL 0.63 MILLIGRAM(S): 1.25 SOLUTION, CONCENTRATE RESPIRATORY (INHALATION) at 15:24

## 2022-09-01 RX ADMIN — Medication 30 UNIT(S): at 07:49

## 2022-09-01 RX ADMIN — Medication 24 UNIT(S): at 17:19

## 2022-09-01 RX ADMIN — Medication 10 MILLIGRAM(S): at 13:21

## 2022-09-01 RX ADMIN — Medication 650 MILLIGRAM(S): at 16:40

## 2022-09-01 RX ADMIN — ATORVASTATIN CALCIUM 10 MILLIGRAM(S): 80 TABLET, FILM COATED ORAL at 21:10

## 2022-09-01 RX ADMIN — LIDOCAINE 1 PATCH: 4 CREAM TOPICAL at 18:07

## 2022-09-01 RX ADMIN — Medication 12.5 MILLIGRAM(S): at 21:10

## 2022-09-01 RX ADMIN — Medication 650 MILLIGRAM(S): at 15:40

## 2022-09-01 RX ADMIN — Medication 10 MILLIGRAM(S): at 05:55

## 2022-09-01 RX ADMIN — Medication 50 MILLIGRAM(S): at 05:55

## 2022-09-01 RX ADMIN — Medication 2: at 07:48

## 2022-09-01 RX ADMIN — PANTOPRAZOLE SODIUM 40 MILLIGRAM(S): 20 TABLET, DELAYED RELEASE ORAL at 05:55

## 2022-09-01 RX ADMIN — HEPARIN SODIUM 5000 UNIT(S): 5000 INJECTION INTRAVENOUS; SUBCUTANEOUS at 13:22

## 2022-09-01 RX ADMIN — HEPARIN SODIUM 5000 UNIT(S): 5000 INJECTION INTRAVENOUS; SUBCUTANEOUS at 05:55

## 2022-09-01 NOTE — PROVIDER CONTACT NOTE (OTHER) - RECOMMENDATIONS
Provider to come assess pt.
Notify provider
Provider aware. Will draw BMP, VBG, and Beta labs. Patient is NPO so blood sugar monitoring every 6 hours.
Provider Aware and will recheck blood glucose 15 minutes after administering 6 units of insulin

## 2022-09-01 NOTE — PROGRESS NOTE ADULT - ASSESSMENT
72 year old Female with history of COPD, CHF presents with weakness. Nephrology consulted for elevated Scr.    1) MISAEL: Secondary to CRS? MISAEL resolved. UA bland. FeUrea low. Bladder scan not performed. Avoid nephrotoxins.    2) CKD-3b: Baseline Scr 1.4-1.6 with CKD likely due to DM. Outpatient CKD work up. Monitor electrolytes.    3) HTN with CKD: BP controlled. Continue with current medications. Check orthostatics given complaints of dizziness. Monitor BP.    4) LE edema: Improving. Continue with bumex 2 mg IV twice daily but will transition to 2 mg PO daily on 9/2. Prior TTE with grossly normal LVSF. Monitor UO.    5) L renal mass: Patient refusing inpatient work up. Outpatient Urology evaluation.    6) Hyperkalemia: In setting of hyperglycemia. S/P lokelma. Continue with low K diet. Monitor serum K.      [facility] NEPHROLOGY  Rodrigue Amador M.D.  Rob Perez D.O.  Ana Song M.D.  Lucrecia López, ALLISON, ANP-C    Telephone: [phone]  Facsimile: [phone]    [address] [place] [address]

## 2022-09-01 NOTE — PROGRESS NOTE ADULT - SUBJECTIVE AND OBJECTIVE BOX
SUBJECTIVE / OVERNIGHT EVENTS:pt seen and examined  c/ o dizziness, no weakness numbness in ext     MEDICATIONS  (STANDING):  atorvastatin 10 milliGRAM(s) Oral at bedtime  budesonide 160 MICROgram(s)/formoterol 4.5 MICROgram(s) Inhaler 2 Puff(s) Inhalation two times a day  dextrose 5%. 1000 milliLiter(s) (100 mL/Hr) IV Continuous <Continuous>  dextrose 5%. 1000 milliLiter(s) (50 mL/Hr) IV Continuous <Continuous>  dextrose 50% Injectable 25 Gram(s) IV Push once  dextrose 50% Injectable 12.5 Gram(s) IV Push once  dextrose 50% Injectable 25 Gram(s) IV Push once  ferrous    sulfate 325 milliGRAM(s) Oral daily  glucagon  Injectable 1 milliGRAM(s) IntraMuscular once  heparin   Injectable 5000 Unit(s) SubCutaneous every 8 hours  hydrALAZINE 10 milliGRAM(s) Oral three times a day  insulin glargine Injectable (LANTUS) 55 Unit(s) SubCutaneous at bedtime  insulin lispro (ADMELOG) corrective regimen sliding scale   SubCutaneous Before meals and at bedtime  insulin lispro Injectable (ADMELOG) 24 Unit(s) SubCutaneous three times a day before meals  levalbuterol Inhalation 0.63 milliGRAM(s) Inhalation every 6 hours  lidocaine   4% Patch 1 Patch Transdermal daily  meclizine 12.5 milliGRAM(s) Oral three times a day  methylPREDNISolone sodium succinate Injectable 40 milliGRAM(s) IV Push two times a day  metoprolol succinate ER 50 milliGRAM(s) Oral daily  pantoprazole    Tablet 40 milliGRAM(s) Oral before breakfast    MEDICATIONS  (PRN):  acetaminophen     Tablet .. 650 milliGRAM(s) Oral every 6 hours PRN Mild Pain (1 - 3), Moderate Pain (4 - 6), Severe Pain (7 - 10)  ALBUTerol    90 MICROgram(s) HFA Inhaler 2 Puff(s) Inhalation every 6 hours PRN Shortness of Breath and/or Wheezing  dextrose Oral Gel 15 Gram(s) Oral once PRN Blood Glucose LESS THAN 70 milliGRAM(s)/deciliter    Vital Signs Last 24 Hrs  T(C): 36.7 (22 @ 12:55), Max: 36.8 (22 @ 19:04)  T(F): 98 (22 @ 12:55), Max: 98.3 (22 @ 19:04)  HR: 68 (22 @ 15:32) (68 - 98)  BP: 115/54 (22 @ 12:55) (115/54 - 149/79)  BP(mean): --  RR: 18 (22 @ 12:55) (18 - 18)  SpO2: 100% (22 @ 12:55) (97% - 100%)    Orthostatic VS  22 @ 12:55  Lying BP: 115/54 HR: 75  Sitting BP: 113/74 HR: 76  Standing BP: 140/71 HR: --  Site: upper right arm  Mode: electronic          Constitutional: No fever, fatigue  Skin: No rash.  Eyes: No recent vision problems or eye pain.  ENT: No congestion, ear pain, or sore throat.  Cardiovascular: No chest pain or palpation.  Respiratory: No cough, shortness of breath, congestion, or wheezing.  Gastrointestinal: No abdominal pain, nausea, vomiting, or diarrhea.  Genitourinary: No dysuria.  Musculoskeletal: No joint swelling.  Neurologic: No headache.    PHYSICAL EXAM:  GENERAL: NAD  EYES: EOMI, PERRLA  NECK: Supple, No JVD  CHEST/LUNG: dec breath sounds at bases  HEART:  S1 , S2 +  ABDOMEN: soft, bs+  EXTREMITIES:  edema+  NEUROLOGY:alert awake oriented      LABS:      137  |  92<L>  |  113<H>  ----------------------------<  197<H>  3.9   |  35<H>  |  1.69<H>    Ca    10.3      01 Sep 2022 09:09  Phos  3.4       Mg     2.20           Creatinine Trend: 1.69 <--, 1.65 <--, 1.65 <--, 1.85 <--, 1.79 <--, 1.81 <--, 1.89 <--, 1.69 <--                        9.7    9.77  )-----------( 275      ( 01 Sep 2022 09:09 )             33.4     Urine Studies:  Urinalysis Basic - ( 29 Aug 2022 09:05 )    Color: Light Yellow / Appearance: Clear / S.011 / pH:   Gluc:  / Ketone: Negative  / Bili: Negative / Urobili: <2 mg/dL   Blood:  / Protein: Negative / Nitrite: Negative   Leuk Esterase: Negative / RBC: N/A /HPF / WBC N/A /HPF   Sq Epi:  / Non Sq Epi:  / Bacteria:       Creatinine, Random Urine: 44 mg/dL ( @ 09:05)                                  Imaging Personally Reviewed:    Consultant(s) Notes Reviewed:  yes    Care Discussed with Consultants/Other Providers:yes

## 2022-09-01 NOTE — PROGRESS NOTE ADULT - SUBJECTIVE AND OBJECTIVE BOX
[facility] NEPHROLOGY- PROGRESS NOTE    72 year old Female with history of COPD, CHF presents with weakness. Nephrology consulted for elevated Scr.    REVIEW OF SYSTEMS:  Gen: no fevers, + dizziness this morning  Cards: no chest pain  Resp: + dyspnea improving  GI: no nausea or vomiting or diarrhea  Vascular: + LE edema    No Known Allergies      Hospital Medications: Medications reviewed      VITALS:  T(F): 97.7 (22 @ 05:55), Max: 98.3 (22 @ 19:04)  HR: 77 (22 @ 08:06)  BP: 149/79 (22 @ 05:55)  RR: 18 (22 @ 05:55)  SpO2: 100% (22 @ 05:55)  Wt(kg): --      PHYSICAL EXAM:    Gen: NAD, calm  Cards: Irregularly irregular, +S1/S2, no M/G/R  Resp: L basilar rales?  GI: soft, NT/ND, NABS  Vascular: trace LE edema B/L      LABS:      129<L>  |  90<L>  |  94<H>  ----------------------------<  381<H>  5.5<H>   |  29  |  1.65<H>    Ca    10.1      31 Aug 2022 01:03  Phos  3.0       Mg     2.20           Creatinine Trend: 1.65 <--, 1.65 <--, 1.85 <--, 1.79 <--, 1.81 <--, 1.89 <--, 1.69 <--    Urine Studies:  Urinalysis Basic - ( 29 Aug 2022 09:05 )    Color: Light Yellow / Appearance: Clear / S.011 / pH:   Gluc:  / Ketone: Negative  / Bili: Negative / Urobili: <2 mg/dL   Blood:  / Protein: Negative / Nitrite: Negative   Leuk Esterase: Negative / RBC: N/A /HPF / WBC N/A /HPF   Sq Epi:  / Non Sq Epi:  / Bacteria:       Creatinine, Random Urine: 44 mg/dL ( @ 09:05)

## 2022-09-01 NOTE — PROVIDER CONTACT NOTE (OTHER) - REASON
Hyperglycemia 551
Pt with possibly 22-23 beats of VTach or artifact while moving from bed to bedside commode overnight
Hyperglycemia 497
Pt c/o dizzy and lightheaded

## 2022-09-01 NOTE — PROVIDER CONTACT NOTE (OTHER) - ACTION/TREATMENT ORDERED:
ACP at bedside to assess pt. Continue to monitor.
Provider notified. Provider to come to bedside to assess patient. Safety maintained. Will continue to monitor.
Provider aware. Will repeat blood glucose and continue to monitor patient.
Provider Aware. Administer Insulin using sliding scale and continue to monitor patient.

## 2022-09-01 NOTE — PROVIDER CONTACT NOTE (OTHER) - BACKGROUND
Type 2 DM. COPD. 3 L NC O2 at home. CHF.
CHF and Hyperglycemia.
73 y/o female cam in for dizziness and shortness of breath.
Admitted for dizziness and shortness of breath.

## 2022-09-01 NOTE — PROVIDER CONTACT NOTE (OTHER) - SITUATION
Hyperglycemia 497
Pt awoke c/o dizzy and light headed.
Hyperglycemia 550
Pt with possibly 22-23 beats of VTach or artifact while moving from bed to bedside commode overnight

## 2022-09-01 NOTE — CHART NOTE - NSCHARTNOTEFT_GEN_A_CORE
Notified by RN that patient c/o dizziness.  Patient seen and assessed at bedside. Patient is alert and oriented x 4.  Patient sitting in bed. Reports feeling dizzy since evening. Denies room spinning sensation, chest pain,  palpitation , headache , vision changes, nausea and  vomiting. No focal neurological deficits noted. Will obtain orthostatics vital signs . Bed rest ordered.     T(C): 36.5 (01 Sep 2022 01:36), Max: 36.8 (31 Aug 2022 19:04)  T(F): 97.7 (01 Sep 2022 01:36), Max: 98.3 (31 Aug 2022 19:04)  HR: 80 (01 Sep 2022 01:36) (60 - 98)  BP: 146/67 (01 Sep 2022 01:36) (119/72 - 148/90)  RR: 18 (01 Sep 2022 01:36) (17 - 18)  SpO2: 100% (01 Sep 2022 01:36) (94% - 100%)    O2 Parameters below as of 01 Sep 2022 01:36  Patient On (Oxygen Delivery Method): nasal cannula  O2 Flow (L/min): 4

## 2022-09-01 NOTE — PROGRESS NOTE ADULT - SUBJECTIVE AND OBJECTIVE BOX
PULMONARY PROGRESS NOTE    DAMARI GUERRERO  MRN-[id number]    Patient is a 72y old  Female who presents with a chief complaint of lightheadedness and fatigue for 5 days (01 Sep 2022 08:53)      HPI:  -says she skipped some doses of solumedrol because it makes her feel jittery  she is okay with taking dose today and switching to prednisone taper  breathing is ok  currently says she feels dizzy  used avaps last night  -    ROS:   -    ACTIVE MEDICATION LIST:  MEDICATIONS  (STANDING):  atorvastatin 10 milliGRAM(s) Oral at bedtime  budesonide 160 MICROgram(s)/formoterol 4.5 MICROgram(s) Inhaler 2 Puff(s) Inhalation two times a day  buMETAnide Injectable 2 milliGRAM(s) IV Push two times a day  dextrose 5%. 1000 milliLiter(s) (50 mL/Hr) IV Continuous <Continuous>  dextrose 5%. 1000 milliLiter(s) (100 mL/Hr) IV Continuous <Continuous>  dextrose 50% Injectable 25 Gram(s) IV Push once  dextrose 50% Injectable 12.5 Gram(s) IV Push once  dextrose 50% Injectable 25 Gram(s) IV Push once  ferrous    sulfate 325 milliGRAM(s) Oral daily  glucagon  Injectable 1 milliGRAM(s) IntraMuscular once  heparin   Injectable 5000 Unit(s) SubCutaneous every 8 hours  hydrALAZINE 10 milliGRAM(s) Oral three times a day  insulin glargine Injectable (LANTUS) 55 Unit(s) SubCutaneous at bedtime  insulin lispro (ADMELOG) corrective regimen sliding scale   SubCutaneous Before meals and at bedtime  insulin lispro Injectable (ADMELOG) 30 Unit(s) SubCutaneous three times a day before meals  levalbuterol Inhalation 0.63 milliGRAM(s) Inhalation every 6 hours  lidocaine   4% Patch 1 Patch Transdermal daily  methylPREDNISolone sodium succinate Injectable 40 milliGRAM(s) IV Push two times a day  metoprolol succinate ER 50 milliGRAM(s) Oral daily  pantoprazole    Tablet 40 milliGRAM(s) Oral before breakfast    MEDICATIONS  (PRN):  acetaminophen     Tablet .. 650 milliGRAM(s) Oral every 6 hours PRN Mild Pain (1 - 3), Moderate Pain (4 - 6), Severe Pain (7 - 10)  ALBUTerol    90 MICROgram(s) HFA Inhaler 2 Puff(s) Inhalation every 6 hours PRN Shortness of Breath and/or Wheezing  dextrose Oral Gel 15 Gram(s) Oral once PRN Blood Glucose LESS THAN 70 milliGRAM(s)/deciliter      EXAM:  Vital Signs Last 24 Hrs  T(C): 36.5 (01 Sep 2022 05:55), Max: 36.8 (31 Aug 2022 19:04)  T(F): 97.7 (01 Sep 2022 05:55), Max: 98.3 (31 Aug 2022 19:04)  HR: 77 (01 Sep 2022 08:06) (66 - 98)  BP: 149/79 (01 Sep 2022 05:55) (129/47 - 149/79)  BP(mean): --  RR: 18 (01 Sep 2022 05:55) (18 - 18)  SpO2: 100% (01 Sep 2022 05:55) (94% - 100%)    Parameters below as of 01 Sep 2022 05:55  Patient On (Oxygen Delivery Method): nasal cannula  O2 Flow (L/min): 3      GENERAL: The patient is awake and alert in no apparent distress.     LUNGS: Clear to auscultation without wheezing, rales or rhonchi; respirations unlabored         08-31    129<L>  |  90<L>  |  94<H>  ----------------------------<  381<H>  5.5<H>   |  29  |  1.65<H>    Ca    10.1      31 Aug 2022 01:03  Phos  3.0     08-31  Mg     2.20     08-31       < from: Xray Chest 1 View- PORTABLE-Urgent (Xray Chest 1 View- PORTABLE-Urgent .) (08.29.22 @ 09:49) >    ACC: [id number] EXAM:  XR CHEST PORTABLE URGENT 1V                        ACC: [id number] EXAM:  XR CHEST PORTABLE URGENT 1V                          PROCEDURE DATE:  08/29/2022          INTERPRETATION:  CLINICAL INFORMATION: Dyspnea    TIME OF EXAMINATION: August 27, 2022 at 1:24 PM and August 29 at 8:45 AM    EXAM: Portable chest    FINDINGS:  August 27 at 1:24 PM:  There are no focal consolidations. Body habitus limits evaluation. There   is a perihilar haze may be overlying soft tissues versus mild edema.   Heart appears enlarged but stable. Sternotomy wires are present.    August 29 at 8:45 AM:  No interval change. Perihilar haze with mild interstitial prominence   consistent with CHF. No focal consolidations to suggest pneumonia. No   pneumothorax.    COMPARISON: August 23      IMPRESSION:  *  No focal consolidations.  *  Mild pulmonary edema.    --- End of Report ---            DELIA PEDERSEN MD; Attending Radiologist  This document has been electronically signed. Aug 29 2022 11:04AM    < end of copied text >      PROBLEM LIST:  72y Female with HEALTH ISSUES - PROBLEM Dx:  Hyperglycemia due to diabetes mellitus    Chronic diastolic heart failure    MIGUEL treated with BiPAP    COPD, severe    Chronic atrial fibrillation    Need for prophylactic measure    HLD (hyperlipidemia)    Diabetes mellitus, new onset    Hypertension    Acute kidney injury superimposed on CKD    Steroid-induced hyperglycemia           RECS:  prednisone taper  avaps  02  primary team f/u re: dizziness  diuresis        Please call with any questions.    Irma Eaton DO  [facility] [place] Pulmonary/Sleep Medicine  [phone]

## 2022-09-01 NOTE — PROGRESS NOTE ADULT - ASSESSMENT
73 y/o F w/ COPD on home O2(3L) with severe pHTN, OHS/MIGUEL on home biPAP, HTN, HLD admitted for hyperglycemia >600 with c/f HHS.  Endocrine consulted for management of hyperglycemia/new T2DM.  Patient does not meet full criteria for HHS (serum glc >600, plasma osmolality >320, AMS), and is responding adequately to insulin but remains with hyperglycemia, exacerbated by steroids     1. DM 2 with hyperglycemia  Admitted with HHS  Now with steroid induced hyperglycemia - on Solumedrol     While inpatient:  BG target 100-180 mg/dl   Continue Lantus 55 units SQ qHS  If continues to refuse the IV steroid, recommedn lowering lantus dose to 48 units  Decrease Admelog to 26 units SQ TID before meals (Hold if NPO/not eating meal)  If patient continues to refuse the IV steroid, recommend lowering Admelog dose to 18 units   Continue Admelog MODERATE dose correctional scales before meals and bedtime - switch to same scale before meals and bedtime for hyperglycemia recovery, once BG trending below 200 consistently, can transition back to separate scale (bedtime coverage starting at 251 mg/dl)   Consistent carbohydrate diet, Nutrition consult (completed)  Check BG before meals and bedtime  Hypoglycemia protocol   Provider to RN: Insulin pen administration and glucometer teaching prior to discharge. Please send glucometer to [facility] and teach pt with her own glucometer prior to discharge.    Discharge Plan:  Levemir and Novolog PENS with dosing TBD   Discuss glycemic goal of a1c <7% to prevent microvascular complications of diabetes mellitus and reduce the risk of macrovascular complications.  Make sure patient knows how to inject insulin and check fingersticks with glucometer (ask bedside RN for teaching)  Patient's  takes Levemir and Novolog at home and patient reports he will assist her  Ensure patient has working glucometer, test strips, lancets, alcohol pads, and BD sharan pen needles  Please also prescribe glucose tabs, Baqsimi nasal spray or glucagon emergency kit for hypoglycemia risk   Pt should call PMD for DM related questions or concerns until pt is seen by CDE or endocrine   Recommend PCP, endocrinology, podiatry and ophthalmology follow up  Followup scheduled with [facility]: [facility], [address] [place] [address], [address], [place] [address], [phone]   1. With Nurse Practitioner: Linda 9/9/22 at 1:30 PM   2. Endocrinologist: Dr. Henderson 11/14/22 12:20 PM    2. Steroid use  Patient intermittently using prednisone 10mg for SOB, last dose 2 days ago.  Restarted on Solumedrol, 40 mg BID and starting prednisone taper tomorrow  Steroid induced hyperglycemia - see insulin adjustments as above  Notify endocrine with any updates to steroid plan     3. HTN  BP less than 130/80  On Hydralazine, Metoprolol, Bumex   Further titration per primary team    4. HLD  On Atorvastatin 10mg daily  LDL is not at goal of less than 70 (currently 110), would suggest increasing statin therapy if no contraindication  Defer management decision to primary team     Jamila Marroquin  Nurse Practitioner  Division of Endocrinology & Diabetes  Pager # [phone]      If after 6PM or before 9AM, or on weekends/holidays, please call endocrine answering service for assistance ([phone]).  For nonurgent matters email [email or web address] for assistance.

## 2022-09-01 NOTE — PROVIDER CONTACT NOTE (OTHER) - ASSESSMENT
Hyperglycemia 557. Patient is AOx4 and denies any shortness of breath, chest pain, weakness, or headache.
Hyperglycemia 497. Patient is AOx4 and is in no acute distress.
Pt A&Ox4. VSS. Pt on diuretic +Void- Pt out of bed to bedside commode. Pt on BIPAP at night pt was moving from bed to commode to void- on Tele rhythm could be 22-23 beats of VTach or possibly artifact as per tele tech. ACP made aware. Pt asymptomatic during episode. Pt denies chest pain, sob, palpitations, dizziness, HA. Frequent rounding. Call bell within reach. Safety maintained. Will continue to monitor.
VSS. 4L via nc. No CP or palpitations. No visual changes.

## 2022-09-02 LAB
ANION GAP SERPL CALC-SCNC: 12 MMOL/L — SIGNIFICANT CHANGE UP (ref 7–14)
BUN SERPL-MCNC: 106 MG/DL — HIGH (ref 7–23)
CALCIUM SERPL-MCNC: 10.1 MG/DL — SIGNIFICANT CHANGE UP (ref 8.4–10.5)
CHLORIDE SERPL-SCNC: 93 MMOL/L — LOW (ref 98–107)
CO2 SERPL-SCNC: 31 MMOL/L — SIGNIFICANT CHANGE UP (ref 22–31)
CREAT SERPL-MCNC: 1.54 MG/DL — HIGH (ref 0.5–1.3)
EGFR: 35 ML/MIN/1.73M2 — LOW
GLUCOSE BLDC GLUCOMTR-MCNC: 188 MG/DL — HIGH (ref 70–99)
GLUCOSE BLDC GLUCOMTR-MCNC: 224 MG/DL — HIGH (ref 70–99)
GLUCOSE BLDC GLUCOMTR-MCNC: 380 MG/DL — HIGH (ref 70–99)
GLUCOSE BLDC GLUCOMTR-MCNC: 492 MG/DL — CRITICAL HIGH (ref 70–99)
GLUCOSE SERPL-MCNC: 216 MG/DL — HIGH (ref 70–99)
HCT VFR BLD CALC: 34.6 % — SIGNIFICANT CHANGE UP (ref 34.5–45)
HGB BLD-MCNC: 9.9 G/DL — LOW (ref 11.5–15.5)
MAGNESIUM SERPL-MCNC: 2.1 MG/DL — SIGNIFICANT CHANGE UP (ref 1.6–2.6)
MCHC RBC-ENTMCNC: 28.6 GM/DL — LOW (ref 32–36)
MCHC RBC-ENTMCNC: 29.5 PG — SIGNIFICANT CHANGE UP (ref 27–34)
MCV RBC AUTO: 103 FL — HIGH (ref 80–100)
NRBC # BLD: 0 /100 WBCS — SIGNIFICANT CHANGE UP (ref 0–0)
NRBC # FLD: 0 K/UL — SIGNIFICANT CHANGE UP (ref 0–0)
PHOSPHATE SERPL-MCNC: 3 MG/DL — SIGNIFICANT CHANGE UP (ref 2.5–4.5)
PLATELET # BLD AUTO: 291 K/UL — SIGNIFICANT CHANGE UP (ref 150–400)
POTASSIUM SERPL-MCNC: 4.6 MMOL/L — SIGNIFICANT CHANGE UP (ref 3.5–5.3)
POTASSIUM SERPL-SCNC: 4.6 MMOL/L — SIGNIFICANT CHANGE UP (ref 3.5–5.3)
RBC # BLD: 3.36 M/UL — LOW (ref 3.8–5.2)
RBC # FLD: 13.8 % — SIGNIFICANT CHANGE UP (ref 10.3–14.5)
SODIUM SERPL-SCNC: 136 MMOL/L — SIGNIFICANT CHANGE UP (ref 135–145)
WBC # BLD: 10.54 K/UL — HIGH (ref 3.8–10.5)
WBC # FLD AUTO: 10.54 K/UL — HIGH (ref 3.8–10.5)

## 2022-09-02 PROCEDURE — 99232 SBSQ HOSP IP/OBS MODERATE 35: CPT

## 2022-09-02 RX ADMIN — Medication 12.5 MILLIGRAM(S): at 22:58

## 2022-09-02 RX ADMIN — ATORVASTATIN CALCIUM 10 MILLIGRAM(S): 80 TABLET, FILM COATED ORAL at 23:13

## 2022-09-02 RX ADMIN — INSULIN GLARGINE 48 UNIT(S): 100 INJECTION, SOLUTION SUBCUTANEOUS at 23:04

## 2022-09-02 RX ADMIN — Medication 10 MILLIGRAM(S): at 22:58

## 2022-09-02 RX ADMIN — BUDESONIDE AND FORMOTEROL FUMARATE DIHYDRATE 2 PUFF(S): 160; 4.5 AEROSOL RESPIRATORY (INHALATION) at 09:16

## 2022-09-02 RX ADMIN — Medication 10 MILLIGRAM(S): at 07:35

## 2022-09-02 RX ADMIN — Medication 24 UNIT(S): at 12:14

## 2022-09-02 RX ADMIN — LEVALBUTEROL 0.63 MILLIGRAM(S): 1.25 SOLUTION, CONCENTRATE RESPIRATORY (INHALATION) at 09:33

## 2022-09-02 RX ADMIN — Medication 4: at 12:13

## 2022-09-02 RX ADMIN — Medication 10: at 17:05

## 2022-09-02 RX ADMIN — Medication 40 MILLIGRAM(S): at 07:35

## 2022-09-02 RX ADMIN — LEVALBUTEROL 0.63 MILLIGRAM(S): 1.25 SOLUTION, CONCENTRATE RESPIRATORY (INHALATION) at 21:46

## 2022-09-02 RX ADMIN — LEVALBUTEROL 0.63 MILLIGRAM(S): 1.25 SOLUTION, CONCENTRATE RESPIRATORY (INHALATION) at 03:33

## 2022-09-02 RX ADMIN — Medication 12: at 23:09

## 2022-09-02 RX ADMIN — BUDESONIDE AND FORMOTEROL FUMARATE DIHYDRATE 2 PUFF(S): 160; 4.5 AEROSOL RESPIRATORY (INHALATION) at 22:13

## 2022-09-02 RX ADMIN — Medication 50 MILLIGRAM(S): at 07:35

## 2022-09-02 RX ADMIN — Medication 325 MILLIGRAM(S): at 12:14

## 2022-09-02 RX ADMIN — Medication 24 UNIT(S): at 17:05

## 2022-09-02 RX ADMIN — Medication 24 UNIT(S): at 07:39

## 2022-09-02 RX ADMIN — LIDOCAINE 1 PATCH: 4 CREAM TOPICAL at 12:14

## 2022-09-02 RX ADMIN — Medication 40 MILLIGRAM(S): at 07:39

## 2022-09-02 RX ADMIN — HEPARIN SODIUM 5000 UNIT(S): 5000 INJECTION INTRAVENOUS; SUBCUTANEOUS at 14:59

## 2022-09-02 RX ADMIN — Medication 12.5 MILLIGRAM(S): at 07:36

## 2022-09-02 RX ADMIN — Medication 12.5 MILLIGRAM(S): at 14:59

## 2022-09-02 RX ADMIN — Medication 10 MILLIGRAM(S): at 14:59

## 2022-09-02 RX ADMIN — Medication 2: at 07:38

## 2022-09-02 RX ADMIN — PANTOPRAZOLE SODIUM 40 MILLIGRAM(S): 20 TABLET, DELAYED RELEASE ORAL at 07:36

## 2022-09-02 RX ADMIN — ALBUTEROL 2 PUFF(S): 90 AEROSOL, METERED ORAL at 22:55

## 2022-09-02 RX ADMIN — HEPARIN SODIUM 5000 UNIT(S): 5000 INJECTION INTRAVENOUS; SUBCUTANEOUS at 22:56

## 2022-09-02 RX ADMIN — LEVALBUTEROL 0.63 MILLIGRAM(S): 1.25 SOLUTION, CONCENTRATE RESPIRATORY (INHALATION) at 14:24

## 2022-09-02 RX ADMIN — BUMETANIDE 2 MILLIGRAM(S): 0.25 INJECTION INTRAMUSCULAR; INTRAVENOUS at 07:34

## 2022-09-02 RX ADMIN — HEPARIN SODIUM 5000 UNIT(S): 5000 INJECTION INTRAVENOUS; SUBCUTANEOUS at 07:39

## 2022-09-02 NOTE — PHYSICAL THERAPY INITIAL EVALUATION ADULT - PERTINENT HX OF CURRENT PROBLEM, REHAB EVAL
71 y/o Female with pmhx of COPD on home O2(3L) with severe pHTN, OHS/MIGUEL on home biPAP, HTN, HLD who presented with 5 days of lightheadedness and fatigue. Found to be hyperglycemic to >600 with normal AG and no ketones, concern for HHS in setting of new onset diabetes.

## 2022-09-02 NOTE — PHYSICAL THERAPY INITIAL EVALUATION ADULT - PATIENT PROFILE REVIEW, REHAB EVAL
Called patient back. Informed her that I haven't received Anorectal Manometry results yet. I will send urgent request today. Patient voiced understanding.   yes

## 2022-09-02 NOTE — CONSULT NOTE ADULT - ASSESSMENT
71 y/o F with pmhx of COPD on home O2(3L) with severe pHTN, OHS/MIGUEL on home biPAP, HTN, HLD who presents with 5 days of lightheadedness and fatigue found to be in HHS, PE non-focal    Impression: weakness likely in setting of medical illness, low suspicion for central process      supportive care  If dizziness continues can consider a Ct head

## 2022-09-02 NOTE — CONSULT NOTE ADULT - REASON FOR ADMISSION
lightheadedness and fatigue for 5 days

## 2022-09-02 NOTE — PROGRESS NOTE ADULT - SUBJECTIVE AND OBJECTIVE BOX
PULMONARY PROGRESS NOTE    DAMARI GUERRERO  MRN-[id number]    Patient is a 72y old  Female who presents with a chief complaint of lightheadedness and fatigue for 5 days (01 Sep 2022 16:54)      HPI:  -  -    ROS:   -    ACTIVE MEDICATION LIST:  MEDICATIONS  (STANDING):  atorvastatin 10 milliGRAM(s) Oral at bedtime  budesonide 160 MICROgram(s)/formoterol 4.5 MICROgram(s) Inhaler 2 Puff(s) Inhalation two times a day  buMETAnide 2 milliGRAM(s) Oral daily  dextrose 5%. 1000 milliLiter(s) (100 mL/Hr) IV Continuous <Continuous>  dextrose 5%. 1000 milliLiter(s) (50 mL/Hr) IV Continuous <Continuous>  dextrose 50% Injectable 25 Gram(s) IV Push once  dextrose 50% Injectable 12.5 Gram(s) IV Push once  dextrose 50% Injectable 25 Gram(s) IV Push once  ferrous    sulfate 325 milliGRAM(s) Oral daily  glucagon  Injectable 1 milliGRAM(s) IntraMuscular once  heparin   Injectable 5000 Unit(s) SubCutaneous every 8 hours  hydrALAZINE 10 milliGRAM(s) Oral three times a day  insulin glargine Injectable (LANTUS) 48 Unit(s) SubCutaneous at bedtime  insulin lispro (ADMELOG) corrective regimen sliding scale   SubCutaneous Before meals and at bedtime  insulin lispro Injectable (ADMELOG) 24 Unit(s) SubCutaneous three times a day before meals  levalbuterol Inhalation 0.63 milliGRAM(s) Inhalation every 6 hours  lidocaine   4% Patch 1 Patch Transdermal daily  meclizine 12.5 milliGRAM(s) Oral three times a day  methylPREDNISolone sodium succinate Injectable 40 milliGRAM(s) IV Push two times a day  metoprolol succinate ER 50 milliGRAM(s) Oral daily  pantoprazole    Tablet 40 milliGRAM(s) Oral before breakfast  predniSONE   Tablet 40 milliGRAM(s) Oral daily    MEDICATIONS  (PRN):  acetaminophen     Tablet .. 650 milliGRAM(s) Oral every 6 hours PRN Mild Pain (1 - 3), Moderate Pain (4 - 6), Severe Pain (7 - 10)  ALBUTerol    90 MICROgram(s) HFA Inhaler 2 Puff(s) Inhalation every 6 hours PRN Shortness of Breath and/or Wheezing  dextrose Oral Gel 15 Gram(s) Oral once PRN Blood Glucose LESS THAN 70 milliGRAM(s)/deciliter      EXAM:  Vital Signs Last 24 Hrs  T(C): 36.4 (01 Sep 2022 20:50), Max: 36.8 (01 Sep 2022 18:06)  T(F): 97.6 (01 Sep 2022 20:50), Max: 98.2 (01 Sep 2022 18:06)  HR: 74 (02 Sep 2022 06:46) (68 - 82)  BP: 141/76 (01 Sep 2022 20:50) (115/54 - 141/76)  BP(mean): --  RR: 19 (01 Sep 2022 20:50) (18 - 19)  SpO2: 98% (02 Sep 2022 06:46) (96% - 100%)    Parameters below as of 02 Sep 2022 03:37  Patient On (Oxygen Delivery Method): nasal cannula        GENERAL: The patient is awake and alert in no apparent distress.     LUNGS: Clear to auscultation without wheezing, rales or rhonchi; respirations unlabored    HEART: Regular rate and rhythm without murmur.                            9.7    9.77  )-----------( 275      ( 01 Sep 2022 09:09 )             33.4       09-01    137  |  92<L>  |  113<H>  ----------------------------<  197<H>  3.9   |  35<H>  |  1.69<H>    Ca    10.3      01 Sep 2022 09:09  Phos  3.4     09-01  Mg     2.20     09-01      >>> <<<    PROBLEM LIST:  72y Female with HEALTH ISSUES - PROBLEM Dx:  Hyperglycemia due to diabetes mellitus    Chronic diastolic heart failure    MIGUEL treated with BiPAP    COPD, severe    Chronic atrial fibrillation    Need for prophylactic measure    HLD (hyperlipidemia)    Diabetes mellitus, new onset    Hypertension    Acute kidney injury superimposed on CKD    Steroid-induced hyperglycemia              RECS:        Please call with any questions.    Irma Eaton DO  [facility] Pulmonary/Sleep Medicine  [phone]   PULMONARY PROGRESS NOTE    DAMARI GUERRERO  MRN-[id number]    Patient is a 72y old  Female who presents with a chief complaint of lightheadedness and fatigue for 5 days (01 Sep 2022 16:54)      HPI:  -seen sleeping on nc  she did not communicate much this morning  denied breathing complaints today  -    ROS:   -    ACTIVE MEDICATION LIST:  MEDICATIONS  (STANDING):  atorvastatin 10 milliGRAM(s) Oral at bedtime  budesonide 160 MICROgram(s)/formoterol 4.5 MICROgram(s) Inhaler 2 Puff(s) Inhalation two times a day  buMETAnide 2 milliGRAM(s) Oral daily  dextrose 5%. 1000 milliLiter(s) (100 mL/Hr) IV Continuous <Continuous>  dextrose 5%. 1000 milliLiter(s) (50 mL/Hr) IV Continuous <Continuous>  dextrose 50% Injectable 25 Gram(s) IV Push once  dextrose 50% Injectable 12.5 Gram(s) IV Push once  dextrose 50% Injectable 25 Gram(s) IV Push once  ferrous    sulfate 325 milliGRAM(s) Oral daily  glucagon  Injectable 1 milliGRAM(s) IntraMuscular once  heparin   Injectable 5000 Unit(s) SubCutaneous every 8 hours  hydrALAZINE 10 milliGRAM(s) Oral three times a day  insulin glargine Injectable (LANTUS) 48 Unit(s) SubCutaneous at bedtime  insulin lispro (ADMELOG) corrective regimen sliding scale   SubCutaneous Before meals and at bedtime  insulin lispro Injectable (ADMELOG) 24 Unit(s) SubCutaneous three times a day before meals  levalbuterol Inhalation 0.63 milliGRAM(s) Inhalation every 6 hours  lidocaine   4% Patch 1 Patch Transdermal daily  meclizine 12.5 milliGRAM(s) Oral three times a day  methylPREDNISolone sodium succinate Injectable 40 milliGRAM(s) IV Push two times a day  metoprolol succinate ER 50 milliGRAM(s) Oral daily  pantoprazole    Tablet 40 milliGRAM(s) Oral before breakfast  predniSONE   Tablet 40 milliGRAM(s) Oral daily    MEDICATIONS  (PRN):  acetaminophen     Tablet .. 650 milliGRAM(s) Oral every 6 hours PRN Mild Pain (1 - 3), Moderate Pain (4 - 6), Severe Pain (7 - 10)  ALBUTerol    90 MICROgram(s) HFA Inhaler 2 Puff(s) Inhalation every 6 hours PRN Shortness of Breath and/or Wheezing  dextrose Oral Gel 15 Gram(s) Oral once PRN Blood Glucose LESS THAN 70 milliGRAM(s)/deciliter      EXAM:  Vital Signs Last 24 Hrs  T(C): 36.4 (01 Sep 2022 20:50), Max: 36.8 (01 Sep 2022 18:06)  T(F): 97.6 (01 Sep 2022 20:50), Max: 98.2 (01 Sep 2022 18:06)  HR: 74 (02 Sep 2022 06:46) (68 - 82)  BP: 141/76 (01 Sep 2022 20:50) (115/54 - 141/76)  BP(mean): --  RR: 19 (01 Sep 2022 20:50) (18 - 19)  SpO2: 98% (02 Sep 2022 06:46) (96% - 100%)    Parameters below as of 02 Sep 2022 03:37  Patient On (Oxygen Delivery Method): nasal cannula        GENERAL: The patient is in no apparent distress.     LUNGS: poor effort  no obvious wheeze                           9.7    9.77  )-----------( 275      ( 01 Sep 2022 09:09 )             33.4       09-01    137  |  92<L>  |  113<H>  ----------------------------<  197<H>  3.9   |  35<H>  |  1.69<H>    Ca    10.3      01 Sep 2022 09:09  Phos  3.4     09-01  Mg     2.20     09-01       < from: Xray Chest 1 View- PORTABLE-Urgent (Xray Chest 1 View- PORTABLE-Urgent .) (08.29.22 @ 09:49) >    ACC: [id number] EXAM:  XR CHEST PORTABLE URGENT 1V                        ACC: [id number] EXAM:  XR CHEST PORTABLE URGENT 1V                          PROCEDURE DATE:  08/29/2022          INTERPRETATION:  CLINICAL INFORMATION: Dyspnea    TIME OF EXAMINATION: August 27, 2022 at 1:24 PM and August 29 at 8:45 AM    EXAM: Portable chest    FINDINGS:  August 27 at 1:24 PM:  There are no focal consolidations. Body habitus limits evaluation. There   is a perihilar haze may be overlying soft tissues versus mild edema.   Heart appears enlarged but stable. Sternotomy wires are present.    August 29 at 8:45 AM:  No interval change. Perihilar haze with mild interstitial prominence   consistent with CHF. No focal consolidations to suggest pneumonia. No   pneumothorax.    COMPARISON: August 23      IMPRESSION:  *  No focal consolidations.  *  Mild pulmonary edema.    --- End of Report ---            DELIA PEDERSEN MD; Attending Radiologist  This document has been electronically signed. Aug 29 2022 11:04AM    < end of copied text >      PROBLEM LIST:  72y Female with HEALTH ISSUES - PROBLEM Dx:  Hyperglycemia due to diabetes mellitus    Chronic diastolic heart failure    MIGUEL treated with BiPAP    COPD, severe    Chronic atrial fibrillation    Need for prophylactic measure    HLD (hyperlipidemia)    Diabetes mellitus, new onset    Hypertension    Acute kidney injury superimposed on CKD    Steroid-induced hyperglycemia              RECS:  prednisone taper  02  avaps        Please call with any questions.    Irma Eaton DO  [facility] Pulmonary/Sleep Medicine  [phone]

## 2022-09-02 NOTE — PROGRESS NOTE ADULT - SUBJECTIVE AND OBJECTIVE BOX
SUBJECTIVE / OVERNIGHT EVENTS:pt seen and examined  c/ o dizziness, no weakness numbness in ext     MEDICATIONS  (STANDING):  atorvastatin 10 milliGRAM(s) Oral at bedtime  budesonide 160 MICROgram(s)/formoterol 4.5 MICROgram(s) Inhaler 2 Puff(s) Inhalation two times a day  buMETAnide 2 milliGRAM(s) Oral daily  dextrose 5%. 1000 milliLiter(s) (50 mL/Hr) IV Continuous <Continuous>  dextrose 5%. 1000 milliLiter(s) (100 mL/Hr) IV Continuous <Continuous>  dextrose 50% Injectable 25 Gram(s) IV Push once  dextrose 50% Injectable 12.5 Gram(s) IV Push once  dextrose 50% Injectable 25 Gram(s) IV Push once  ferrous    sulfate 325 milliGRAM(s) Oral daily  glucagon  Injectable 1 milliGRAM(s) IntraMuscular once  heparin   Injectable 5000 Unit(s) SubCutaneous every 8 hours  hydrALAZINE 10 milliGRAM(s) Oral three times a day  insulin glargine Injectable (LANTUS) 48 Unit(s) SubCutaneous at bedtime  insulin lispro (ADMELOG) corrective regimen sliding scale   SubCutaneous Before meals and at bedtime  insulin lispro Injectable (ADMELOG) 24 Unit(s) SubCutaneous three times a day before meals  levalbuterol Inhalation 0.63 milliGRAM(s) Inhalation every 6 hours  lidocaine   4% Patch 1 Patch Transdermal daily  meclizine 12.5 milliGRAM(s) Oral three times a day  methylPREDNISolone sodium succinate Injectable 40 milliGRAM(s) IV Push two times a day  metoprolol succinate ER 50 milliGRAM(s) Oral daily  pantoprazole    Tablet 40 milliGRAM(s) Oral before breakfast  predniSONE   Tablet 40 milliGRAM(s) Oral daily    MEDICATIONS  (PRN):  acetaminophen     Tablet .. 650 milliGRAM(s) Oral every 6 hours PRN Mild Pain (1 - 3), Moderate Pain (4 - 6), Severe Pain (7 - 10)  ALBUTerol    90 MICROgram(s) HFA Inhaler 2 Puff(s) Inhalation every 6 hours PRN Shortness of Breath and/or Wheezing  dextrose Oral Gel 15 Gram(s) Oral once PRN Blood Glucose LESS THAN 70 milliGRAM(s)/deciliter    Vital Signs Last 24 Hrs  T(C): 33.8 (22 @ 15:12), Max: 36.7 (22 @ 06:00)  T(F): 92.9 (22 @ 15:12), Max: 98.1 (22 @ 06:00)  HR: 67 (22 @ 15:12) (67 - 94)  BP: 160/87 (22 @ 15:12) (114/78 - 160/87)  BP(mean): --  RR: 17 (22 @ 15:12) (17 - 17)  SpO2: 93% (22 @ 15:12) (93% - 99%)    Orthostatic VS  22 @ 12:55  Lying BP: 115/54 HR: 75  Sitting BP: 113/74 HR: 76  Standing BP: 140/71 HR: --  Site: upper right arm  Mode: electronic    Orthostatic VS  22 @ 12:55  Lying BP: 115/54 HR: 75  Sitting BP: 113/74 HR: 76  Standing BP: 140/71 HR: --  Site: upper right arm  Mode: electronic          Constitutional: No fever, fatigue  Skin: No rash.  Eyes: No recent vision problems or eye pain.  ENT: No congestion, ear pain, or sore throat.  Cardiovascular: No chest pain or palpation.  Respiratory: No cough, shortness of breath, congestion, or wheezing.  Gastrointestinal: No abdominal pain, nausea, vomiting, or diarrhea.  Genitourinary: No dysuria.  Musculoskeletal: No joint swelling.  Neurologic: No headache.    PHYSICAL EXAM:  GENERAL: NAD  EYES: EOMI, PERRLA  NECK: Supple, No JVD  CHEST/LUNG: dec breath sounds at bases  HEART:  S1 , S2 +  ABDOMEN: soft, bs+  EXTREMITIES:  edema+  NEUROLOGY:alert awake oriented    LABS:      136  |  93<L>  |  106<H>  ----------------------------<  216<H>  4.6   |  31  |  1.54<H>    Ca    10.1      02 Sep 2022 10:52  Phos  3.0       Mg     2.10           Creatinine Trend: 1.54 <--, 1.69 <--, 1.65 <--, 1.65 <--, 1.85 <--, 1.79 <--, 1.81 <--, 1.89 <--                        9.9    10.54 )-----------( 291      ( 02 Sep 2022 10:52 )             34.6     Urine Studies:  Urinalysis Basic - ( 29 Aug 2022 09:05 )    Color: Light Yellow / Appearance: Clear / S.011 / pH:   Gluc:  / Ketone: Negative  / Bili: Negative / Urobili: <2 mg/dL   Blood:  / Protein: Negative / Nitrite: Negative   Leuk Esterase: Negative / RBC: N/A /HPF / WBC N/A /HPF   Sq Epi:  / Non Sq Epi:  / Bacteria:       Creatinine, Random Urine: 44 mg/dL ( @ 09:05)                                          Imaging Personally Reviewed:    Consultant(s) Notes Reviewed:  yes    Care Discussed with Consultants/Other Providers:yes

## 2022-09-02 NOTE — CONSULT NOTE ADULT - SUBJECTIVE AND OBJECTIVE BOX
Neurology consult    DAMARI [id number]     Patient is a 72y old  Female who presents with a chief complaint of lightheadedness and fatigue for 5 days (02 Sep 2022 07:04)      HPI:  73 y/o F with pmhx of COPD on home O2(3L) with severe pHTN, OHS/MIGUEL on home biPAP, HTN, HLD who presents with 5 days of lightheadedness and fatigue. Patient states that she has been feeling off for the last few days .Endorse increased fatigue stating she feels "very slow".  She state that at times she feels like the room is spinning.  She denies any fevers, chills, headaches chest pain or increased cough. She has SOB at baseline and uses 3L O2 continuously she denies any increased SOB. She endorse vision changes, stating that "everything seems darker even when there is light outside". She denies any blurry vision or loss of vision. She also endorse polydipsia nad polyuria. Denies polyphagia but does endorse decreased appetite. She states that she sleeps with 3 pillows at night which is normal for her. Also endorse nocturnal dyspnea that has improved since she has been using her bipap at night.   Patient states that she has been using prednisone intermittently for the last month for SOB. She states she was prescribed 10mg BID and states she only takes the medication when she is feeling more dyspnea that her baseline. She states her last dose was 2 days ago. She states that her symptoms progressively worsened to the pint that she felt helpless and decided to call EMS. In EMS her sugars were >500.     In the ED T. BS were measures >600 x2. She was given 5 units of insulin with improvement to 300s. BHB negative. Admitted to medicine for further management.     (24 Aug 2022 08:35)    c/o generalized weakness w/o focality  MEDICATIONS    acetaminophen     Tablet .. 650 milliGRAM(s) Oral every 6 hours PRN  ALBUTerol    90 MICROgram(s) HFA Inhaler 2 Puff(s) Inhalation every 6 hours PRN  atorvastatin 10 milliGRAM(s) Oral at bedtime  budesonide 160 MICROgram(s)/formoterol 4.5 MICROgram(s) Inhaler 2 Puff(s) Inhalation two times a day  buMETAnide 2 milliGRAM(s) Oral daily  dextrose 5%. 1000 milliLiter(s) IV Continuous <Continuous>  dextrose 5%. 1000 milliLiter(s) IV Continuous <Continuous>  dextrose 50% Injectable 25 Gram(s) IV Push once  dextrose 50% Injectable 12.5 Gram(s) IV Push once  dextrose 50% Injectable 25 Gram(s) IV Push once  dextrose Oral Gel 15 Gram(s) Oral once PRN  ferrous    sulfate 325 milliGRAM(s) Oral daily  glucagon  Injectable 1 milliGRAM(s) IntraMuscular once  heparin   Injectable 5000 Unit(s) SubCutaneous every 8 hours  hydrALAZINE 10 milliGRAM(s) Oral three times a day  insulin glargine Injectable (LANTUS) 48 Unit(s) SubCutaneous at bedtime  insulin lispro (ADMELOG) corrective regimen sliding scale   SubCutaneous Before meals and at bedtime  insulin lispro Injectable (ADMELOG) 24 Unit(s) SubCutaneous three times a day before meals  levalbuterol Inhalation 0.63 milliGRAM(s) Inhalation every 6 hours  lidocaine   4% Patch 1 Patch Transdermal daily  meclizine 12.5 milliGRAM(s) Oral three times a day  methylPREDNISolone sodium succinate Injectable 40 milliGRAM(s) IV Push two times a day  metoprolol succinate ER 50 milliGRAM(s) Oral daily  pantoprazole    Tablet 40 milliGRAM(s) Oral before breakfast  predniSONE   Tablet 40 milliGRAM(s) Oral daily      PMH: Atrial fibrillation    Pulmonary hypertension    MIGUEL (obstructive sleep apnea)    COPD (chronic obstructive pulmonary disease)    CHF (congestive heart failure)    HTN (hypertension)    Gout    Mitral valve replaced    Tricuspid valve replaced    CHF (congestive heart failure)    Hypertension    COPD (chronic obstructive pulmonary disease)         PSH: No significant past surgical history    History of mitral valve replacement with mechanical valve    Tricuspid valve replaced    No significant past surgical history        Family history: No history of dementia, strokes, or seizures   FAMILY HISTORY:  Family history of hypertension (Father, Sibling)    Family history of stroke    Family history of hypertension (Mother)        SOCIAL HISTORY:  No history of tobacco or alcohol use     Allergies    No Known Allergies    Intolerances            Vital Signs Last 24 Hrs  T(C): 36.7 (02 Sep 2022 06:00), Max: 36.8 (01 Sep 2022 18:06)  T(F): 98.1 (02 Sep 2022 06:00), Max: 98.2 (01 Sep 2022 18:06)  HR: 84 (02 Sep 2022 09:35) (68 - 89)  BP: 114/78 (02 Sep 2022 06:00) (114/78 - 141/76)  BP(mean): --  RR: 17 (02 Sep 2022 06:00) (17 - 19)  SpO2: 97% (02 Sep 2022 09:35) (96% - 100%)    Parameters below as of 02 Sep 2022 09:35  Patient On (Oxygen Delivery Method): nasal cannula          On Neurological Examination:    Mental Status - Patient is alert, awake, oriented X3. fluent, names, no dysarthria no aphasia Follows commands well and able to answer questions appropriately. Mood and affect  normal    Cranial Nerves - PERRL, EOMI, VFF, V1-V3 intact, no gross facial asymmetry, tongue/uvula midline    Motor Exam -   no drift    Sensory    Intact to light touch and pinprick bilaterally    Coord: FTN intact bilaterally     Gait -  normal      Reflexes:        2+ UEs trace LEs            LABS:  CBC Full  -  ( 02 Sep 2022 10:52 )  WBC Count : 10.54 K/uL  RBC Count : 3.36 M/uL  Hemoglobin : 9.9 g/dL  Hematocrit : 34.6 %  Platelet Count - Automated : 291 K/uL  Mean Cell Volume : 103.0 fL  Mean Cell Hemoglobin : 29.5 pg  Mean Cell Hemoglobin Concentration : 28.6 gm/dL  Auto Neutrophil # : x  Auto Lymphocyte # : x  Auto Monocyte # : x  Auto Eosinophil # : x  Auto Basophil # : x  Auto Neutrophil % : x  Auto Lymphocyte % : x  Auto Monocyte % : x  Auto Eosinophil % : x  Auto Basophil % : x      09-02    136  |  93<L>  |  106<H>  ----------------------------<  216<H>  4.6   |  31  |  1.54<H>    Ca    10.1      02 Sep 2022 10:52  Phos  3.0     09-02  Mg     2.10     09-02        Hemoglobin A1C:             RADIOLOGY  [facility]   MRI:

## 2022-09-02 NOTE — PHYSICAL THERAPY INITIAL EVALUATION ADULT - LEVEL OF INDEPENDENCE: GAIT, REHAB EVAL
Pt stood for few seconds ; pt declined ambulating at this time despite encouragement, stating she wasn't up to it

## 2022-09-02 NOTE — PROGRESS NOTE ADULT - SUBJECTIVE AND OBJECTIVE BOX
[facility] NEPHROLOGY- PROGRESS NOTE    72 year old Female with history of COPD, CHF presents with weakness. Nephrology consulted for elevated Scr.    REVIEW OF SYSTEMS:  Gen: no fevers, + dizziness this morning  Cards: no chest pain  Resp: + dyspnea improving  GI: no nausea or vomiting or diarrhea  Vascular: + LE edema    No Known Allergies      Hospital Medications: Medications reviewed      VITALS:  T(F): 92.9 (22 @ 15:12), Max: 98.2 (22 @ 18:06)  HR: 67 (22 @ 15:12)  BP: 160/87 (22 @ 15:12)  RR: 17 (22 @ 15:12)  SpO2: 93% (22 @ 15:12)  Wt(kg): --     @ 07:01  -   @ 07:00  --------------------------------------------------------  IN: 240 mL / OUT: 0 mL / NET: 240 mL        PHYSICAL EXAM:  Gen: NAD, calm  Cards: Irregularly irregular, +S1/S2, no M/G/R  Resp: L basilar rales?  GI: soft, NT/ND, NABS  Vascular: trace LE edema B/L      LABS:      136  |  93<L>  |  106<H>  ----------------------------<  216<H>  4.6   |  31  |  1.54<H>    Ca    10.1      02 Sep 2022 10:52  Phos  3.0       Mg     2.10           Creatinine Trend: 1.54 <--, 1.69 <--, 1.65 <--, 1.65 <--, 1.85 <--, 1.79 <--, 1.81 <--, 1.89 <--                        9.9    10.54 )-----------( 291      ( 02 Sep 2022 10:52 )             34.6     Urine Studies:  Urinalysis Basic - ( 29 Aug 2022 09:05 )    Color: Light Yellow / Appearance: Clear / S.011 / pH:   Gluc:  / Ketone: Negative  / Bili: Negative / Urobili: <2 mg/dL   Blood:  / Protein: Negative / Nitrite: Negative   Leuk Esterase: Negative / RBC: N/A /HPF / WBC N/A /HPF   Sq Epi:  / Non Sq Epi:  / Bacteria:       Creatinine, Random Urine: 44 mg/dL ( @ 09:05)

## 2022-09-02 NOTE — PROGRESS NOTE ADULT - ASSESSMENT
72 year old Female with history of COPD, CHF presents with weakness. Nephrology consulted for elevated Scr.    1) MISAEL: Secondary to CRS? MISAEL resolved. UA bland. FeUrea low. Bladder scan not performed. Avoid nephrotoxins.    2) CKD-3b: Baseline Scr 1.4-1.6 with CKD likely due to DM. Outpatient CKD work up. Monitor electrolytes.    3) HTN with CKD: BP controlled. Continue with current medications. Check orthostatics given complaints of dizziness. Monitor BP.    4) LE edema: Improving. Changed from bumex 2 mg IV twice daily to 2 mg PO daily starting today 9/2.   Prior TTE with grossly normal LVSF. Monitor UO.    5) L renal mass: Patient refusing inpatient work up. Outpatient Urology evaluation.    6) Hyperkalemia: In setting of hyperglycemia. S/P lokelma. Continue with low K diet. Monitor serum K.      [facility] NEPHROLOGY  Rodrigue Amador M.D.  Rob Perez D.O.  Ana Song M.D.  Lucrecia López, MSN, ANP-C    Telephone: [phone]  Facsimile: [phone]    [address] [place] [address]

## 2022-09-03 LAB
ANION GAP SERPL CALC-SCNC: 11 MMOL/L — SIGNIFICANT CHANGE UP (ref 7–14)
BUN SERPL-MCNC: 106 MG/DL — HIGH (ref 7–23)
CALCIUM SERPL-MCNC: 10.5 MG/DL — SIGNIFICANT CHANGE UP (ref 8.4–10.5)
CHLORIDE SERPL-SCNC: 96 MMOL/L — LOW (ref 98–107)
CO2 SERPL-SCNC: 34 MMOL/L — HIGH (ref 22–31)
CREAT SERPL-MCNC: 1.64 MG/DL — HIGH (ref 0.5–1.3)
EGFR: 33 ML/MIN/1.73M2 — LOW
GLUCOSE BLDC GLUCOMTR-MCNC: 187 MG/DL — HIGH (ref 70–99)
GLUCOSE BLDC GLUCOMTR-MCNC: 245 MG/DL — HIGH (ref 70–99)
GLUCOSE BLDC GLUCOMTR-MCNC: 287 MG/DL — HIGH (ref 70–99)
GLUCOSE BLDC GLUCOMTR-MCNC: 373 MG/DL — HIGH (ref 70–99)
GLUCOSE BLDC GLUCOMTR-MCNC: 401 MG/DL — HIGH (ref 70–99)
GLUCOSE SERPL-MCNC: 200 MG/DL — HIGH (ref 70–99)
HCT VFR BLD CALC: 34.2 % — LOW (ref 34.5–45)
HGB BLD-MCNC: 9.8 G/DL — LOW (ref 11.5–15.5)
MAGNESIUM SERPL-MCNC: 2.3 MG/DL — SIGNIFICANT CHANGE UP (ref 1.6–2.6)
MCHC RBC-ENTMCNC: 28.7 GM/DL — LOW (ref 32–36)
MCHC RBC-ENTMCNC: 29.3 PG — SIGNIFICANT CHANGE UP (ref 27–34)
MCV RBC AUTO: 102.1 FL — HIGH (ref 80–100)
NRBC # BLD: 0 /100 WBCS — SIGNIFICANT CHANGE UP (ref 0–0)
NRBC # FLD: 0.02 K/UL — HIGH (ref 0–0)
PHOSPHATE SERPL-MCNC: 3.3 MG/DL — SIGNIFICANT CHANGE UP (ref 2.5–4.5)
PLATELET # BLD AUTO: 278 K/UL — SIGNIFICANT CHANGE UP (ref 150–400)
POTASSIUM SERPL-MCNC: 4.8 MMOL/L — SIGNIFICANT CHANGE UP (ref 3.5–5.3)
POTASSIUM SERPL-SCNC: 4.8 MMOL/L — SIGNIFICANT CHANGE UP (ref 3.5–5.3)
RBC # BLD: 3.35 M/UL — LOW (ref 3.8–5.2)
RBC # FLD: 13.8 % — SIGNIFICANT CHANGE UP (ref 10.3–14.5)
SODIUM SERPL-SCNC: 141 MMOL/L — SIGNIFICANT CHANGE UP (ref 135–145)
WBC # BLD: 9.74 K/UL — SIGNIFICANT CHANGE UP (ref 3.8–10.5)
WBC # FLD AUTO: 9.74 K/UL — SIGNIFICANT CHANGE UP (ref 3.8–10.5)

## 2022-09-03 PROCEDURE — 99232 SBSQ HOSP IP/OBS MODERATE 35: CPT

## 2022-09-03 RX ORDER — BUMETANIDE 0.25 MG/ML
2 INJECTION INTRAMUSCULAR; INTRAVENOUS DAILY
Refills: 0 | Status: DISCONTINUED | OUTPATIENT
Start: 2022-09-04 | End: 2022-09-04

## 2022-09-03 RX ORDER — BUMETANIDE 0.25 MG/ML
2 INJECTION INTRAMUSCULAR; INTRAVENOUS ONCE
Refills: 0 | Status: COMPLETED | OUTPATIENT
Start: 2022-09-03 | End: 2022-09-03

## 2022-09-03 RX ADMIN — BUMETANIDE 2 MILLIGRAM(S): 0.25 INJECTION INTRAMUSCULAR; INTRAVENOUS at 19:12

## 2022-09-03 RX ADMIN — Medication 10: at 22:09

## 2022-09-03 RX ADMIN — LEVALBUTEROL 0.63 MILLIGRAM(S): 1.25 SOLUTION, CONCENTRATE RESPIRATORY (INHALATION) at 21:02

## 2022-09-03 RX ADMIN — Medication 10 MILLIGRAM(S): at 07:33

## 2022-09-03 RX ADMIN — Medication 24 UNIT(S): at 17:36

## 2022-09-03 RX ADMIN — Medication 10 MILLIGRAM(S): at 18:29

## 2022-09-03 RX ADMIN — PANTOPRAZOLE SODIUM 40 MILLIGRAM(S): 20 TABLET, DELAYED RELEASE ORAL at 07:40

## 2022-09-03 RX ADMIN — Medication 50 MILLIGRAM(S): at 07:38

## 2022-09-03 RX ADMIN — LEVALBUTEROL 0.63 MILLIGRAM(S): 1.25 SOLUTION, CONCENTRATE RESPIRATORY (INHALATION) at 16:49

## 2022-09-03 RX ADMIN — Medication 24 UNIT(S): at 12:07

## 2022-09-03 RX ADMIN — BUMETANIDE 2 MILLIGRAM(S): 0.25 INJECTION INTRAMUSCULAR; INTRAVENOUS at 07:39

## 2022-09-03 RX ADMIN — Medication 12.5 MILLIGRAM(S): at 14:13

## 2022-09-03 RX ADMIN — Medication 40 MILLIGRAM(S): at 07:39

## 2022-09-03 RX ADMIN — HEPARIN SODIUM 5000 UNIT(S): 5000 INJECTION INTRAVENOUS; SUBCUTANEOUS at 14:13

## 2022-09-03 RX ADMIN — Medication 2: at 07:30

## 2022-09-03 RX ADMIN — Medication 24 UNIT(S): at 07:45

## 2022-09-03 RX ADMIN — ATORVASTATIN CALCIUM 10 MILLIGRAM(S): 80 TABLET, FILM COATED ORAL at 22:02

## 2022-09-03 RX ADMIN — Medication 325 MILLIGRAM(S): at 18:29

## 2022-09-03 RX ADMIN — Medication 40 MILLIGRAM(S): at 07:33

## 2022-09-03 RX ADMIN — Medication 12.5 MILLIGRAM(S): at 07:33

## 2022-09-03 RX ADMIN — LIDOCAINE 1 PATCH: 4 CREAM TOPICAL at 19:13

## 2022-09-03 RX ADMIN — HEPARIN SODIUM 5000 UNIT(S): 5000 INJECTION INTRAVENOUS; SUBCUTANEOUS at 07:33

## 2022-09-03 RX ADMIN — INSULIN GLARGINE 48 UNIT(S): 100 INJECTION, SOLUTION SUBCUTANEOUS at 22:08

## 2022-09-03 RX ADMIN — HEPARIN SODIUM 5000 UNIT(S): 5000 INJECTION INTRAVENOUS; SUBCUTANEOUS at 22:03

## 2022-09-03 RX ADMIN — BUDESONIDE AND FORMOTEROL FUMARATE DIHYDRATE 2 PUFF(S): 160; 4.5 AEROSOL RESPIRATORY (INHALATION) at 22:04

## 2022-09-03 RX ADMIN — Medication 4: at 12:07

## 2022-09-03 RX ADMIN — LEVALBUTEROL 0.63 MILLIGRAM(S): 1.25 SOLUTION, CONCENTRATE RESPIRATORY (INHALATION) at 04:27

## 2022-09-03 RX ADMIN — Medication 6: at 17:36

## 2022-09-03 RX ADMIN — LEVALBUTEROL 0.63 MILLIGRAM(S): 1.25 SOLUTION, CONCENTRATE RESPIRATORY (INHALATION) at 09:30

## 2022-09-03 NOTE — PROGRESS NOTE ADULT - ASSESSMENT
72 year old Female with history of COPD, CHF presents with weakness. Nephrology consulted for elevated Scr.    1) MISAEL: Secondary to CRS? MISAEL resolved. Renal fxn stable. UA bland. FeUrea low. Bladder scan not performed. Avoid nephrotoxins.    2) CKD-3b: Baseline Scr 1.4-1.6 with CKD likely due to DM. Outpatient CKD work up. Monitor electrolytes.    3) HTN with CKD: BP controlled. Continue with current medications. Check orthostatics given complaints of dizziness. Monitor BP.    4) LE edema: Worsening again. Changed from bumex 2 mg IV twice daily to 2 mg PO daily, but does not seem to be sufficient.  Will give Bumex 2mg IV X 1 today and then daily starting tomorrow.  Prior TTE with grossly normal LVSF. Monitor UO.    5) L renal mass: Patient refusing inpatient work up. Outpatient Urology evaluation.    6) Hyperkalemia: In setting of hyperglycemia. S/P lokelma. Continue with low K diet. Monitor serum K.      [facility] NEPHROLOGY  Rodrigue Amador M.D.  Rob Perez D.O.  Ana Song M.D.  Lucrecia López, ALLISON, ANP-C    Telephone: [phone]  Facsimile: [phone]    [address] [place] [id number]

## 2022-09-03 NOTE — PROGRESS NOTE ADULT - SUBJECTIVE AND OBJECTIVE BOX
Chief Complaint: DM 2 with hyperglycemia exacerbated by steroids     History: Patient seen at bedside. Reports she is eating meals, denies n/v, denies s/s of hypoglycemia  On steroid taper    MEDICATIONS  (STANDING):  atorvastatin 10 milliGRAM(s) Oral at bedtime  budesonide 160 MICROgram(s)/formoterol 4.5 MICROgram(s) Inhaler 2 Puff(s) Inhalation two times a day  buMETAnide Injectable 2 milliGRAM(s) IV Push two times a day  dextrose 5%. 1000 milliLiter(s) (50 mL/Hr) IV Continuous <Continuous>  dextrose 5%. 1000 milliLiter(s) (100 mL/Hr) IV Continuous <Continuous>  dextrose 50% Injectable 25 Gram(s) IV Push once  dextrose 50% Injectable 12.5 Gram(s) IV Push once  dextrose 50% Injectable 25 Gram(s) IV Push once  ferrous    sulfate 325 milliGRAM(s) Oral daily  glucagon  Injectable 1 milliGRAM(s) IntraMuscular once  heparin   Injectable 5000 Unit(s) SubCutaneous every 8 hours  hydrALAZINE 10 milliGRAM(s) Oral three times a day  insulin glargine Injectable (LANTUS) 48 Unit(s) SubCutaneous at bedtime  insulin lispro (ADMELOG) corrective regimen sliding scale   SubCutaneous three times a day before meals  insulin lispro (ADMELOG) corrective regimen sliding scale   SubCutaneous at bedtime  insulin lispro Injectable (ADMELOG) 25 Unit(s) SubCutaneous three times a day before meals  levalbuterol Inhalation 0.63 milliGRAM(s) Inhalation every 6 hours  lidocaine   4% Patch 1 Patch Transdermal daily  methylPREDNISolone sodium succinate Injectable 40 milliGRAM(s) IV Push two times a day  metoprolol succinate ER 50 milliGRAM(s) Oral daily  pantoprazole    Tablet 40 milliGRAM(s) Oral before breakfast  sodium zirconium cyclosilicate 10 Gram(s) Oral three times a day    MEDICATIONS  (PRN):  acetaminophen     Tablet .. 650 milliGRAM(s) Oral every 6 hours PRN Mild Pain (1 - 3), Moderate Pain (4 - 6), Severe Pain (7 - 10)  ALBUTerol    90 MICROgram(s) HFA Inhaler 2 Puff(s) Inhalation every 6 hours PRN Shortness of Breath and/or Wheezing  dextrose Oral Gel 15 Gram(s) Oral once PRN Blood Glucose LESS THAN 70 milliGRAM(s)/deciliter    No Known Allergies    Review of Systems:  Cardiovascular: No chest pain  Respiratory: +SOB  GI: No nausea, vomiting  Endocrine: no hypoglycemia     PHYSICAL EXAM:  Vital Signs Last 24 Hrs  T(C): 36.3 (03 Sep 2022 09:10), Max: 36.7 (03 Sep 2022 06:00)  T(F): 97.4 (03 Sep 2022 09:10), Max: 98 (03 Sep 2022 06:00)  HR: 52 (03 Sep 2022 09:23) (52 - 81)  BP: 109/52 (03 Sep 2022 09:10) (109/52 - 145/78)  BP(mean): --  RR: 18 (03 Sep 2022 09:10) (18 - 19)  SpO2: 93% (03 Sep 2022 09:23) (93% - 100%)    Parameters below as of 03 Sep 2022 09:23  Patient On (Oxygen Delivery Method): nasal cannula      GENERAL: NAD  EYES: No proptosis, no lid lag, anicteric  HEENT:  Atraumatic, Normocephalic, moist mucous membranes  RESPIRATORY: on nasal cannula, unlabored respirations     CAPILLARY BLOOD GLUCOSE    POCT Blood Glucose.: 245 mg/dL (03 Sep 2022 11:28)  POCT Blood Glucose.: 187 mg/dL (03 Sep 2022 07:25)  POCT Blood Glucose.: 401 mg/dL (03 Sep 2022 01:12)  POCT Blood Glucose.: 492 mg/dL (02 Sep 2022 23:04)  POCT Blood Glucose.: 380 mg/dL (02 Sep 2022 17:00)  POCT Blood Glucose.: 70 mg/dL (01 Sep 2022 16:32)  POCT Blood Glucose.: 129 mg/dL (01 Sep 2022 11:38)  POCT Blood Glucose.: 174 mg/dL (01 Sep 2022 07:37)  POCT Blood Glucose.: 266 mg/dL (01 Sep 2022 01:42)  POCT Blood Glucose.: 215 mg/dL (31 Aug 2022 22:11)  POCT Blood Glucose.: 377 mg/dL (31 Aug 2022 11:44)  POCT Blood Glucose.: 398 mg/dL (31 Aug 2022 11:43)  POCT Blood Glucose.: 255 mg/dL (31 Aug 2022 07:37)  POCT Blood Glucose.: 397 mg/dL (30 Aug 2022 23:34)  POCT Blood Glucose.: 280 mg/dL (30 Aug 2022 17:04)      09-03    141  |  96<L>  |  106<H>  ----------------------------<  200<H>  4.8   |  34<H>  |  1.64<H>    Ca    10.5      03 Sep 2022 05:41  Phos  3.3     09-03  Mg     2.30     09-03        A1C with Estimated Average Glucose Result: 10.7 % (08-23-22 @ 23:18)    Diet, Consistent Carbohydrate/No Snacks:   No Concentrated Potassium  Supplement Feeding Modality:  Oral  Nepro Cans or Servings Per Day:  1       Frequency:  Three Times a day (08-31-22 @ 15:25) [Active]

## 2022-09-03 NOTE — PROGRESS NOTE ADULT - ASSESSMENT
71 y/o F w/ COPD on home O2(3L) with severe pHTN, OHS/MIGUEL on home biPAP, HTN, HLD admitted for hyperglycemia >600 with c/f HHS.  Endocrine consulted for management of hyperglycemia/new T2DM.  Patient does not meet full criteria for HHS (serum glc >600, plasma osmolality >320, AMS), and is responding adequately to insulin but remains with hyperglycemia, exacerbated by steroids     1. DM 2 with hyperglycemia  Admitted with HHS  Now with steroid induced hyperglycemia - on Solumedrol     While inpatient:  BG target 100-180 mg/dl   Continue Lantus 48 units sq qhs  Continue Admelog 24 units sq tid ac- hold for NPO  Continue Admelog MODERATE dose correctional scales before meals and bedtime  Consistent carbohydrate diet, Nutrition consult (completed)  Check BG before meals and bedtime  Hypoglycemia protocol   Expect insulin requirements to decrease as steroid doses are tapered.  For now, will continue current doses  Provider to RN: Insulin pen administration and glucometer teaching prior to discharge. Please send glucometer to [facility] and teach pt with her own glucometer prior to discharge.    Discharge Plan:  Levemir and Novolog PENS with dosing TBD   Discuss glycemic goal of a1c <7% to prevent microvascular complications of diabetes mellitus and reduce the risk of macrovascular complications.  Make sure patient knows how to inject insulin and check fingersticks with glucometer (ask bedside RN for teaching)  Patient's  takes Levemir and Novolog at home and patient reports he will assist her  Ensure patient has working glucometer, test strips, lancets, alcohol pads, and BD sharan pen needles  Please also prescribe glucose tabs, Baqsimi nasal spray or glucagon emergency kit for hypoglycemia risk   Pt should call PMD for DM related questions or concerns until pt is seen by CDE or endocrine   Recommend PCP, endocrinology, podiatry and ophthalmology follow up  Followup scheduled with [facility]: [facility], [address] [place] [address], [address], [place] [address], [phone]   1. With Nurse Practitioner: Linda 9/9/22 at 1:30 PM   2. Endocrinologist: Dr. Henderson 11/14/22 12:20 PM    2. Steroid use  Patient intermittently using prednisone 10mg for SOB, last dose 2 days ago.  Restarted on Solumedrol, 40 mg BID and starting prednisone taper tomorrow  Steroid induced hyperglycemia - see insulin adjustments as above  Notify endocrine with any updates to steroid plan     3. HTN  BP less than 130/80  On Hydralazine, Metoprolol, Bumex   Further titration per primary team    4. HLD  On Atorvastatin 10mg daily  LDL is not at goal of less than 70 (currently 110), would suggest increasing statin therapy if no contraindication  Defer management decision to primary team     Jamila Marroquin  Nurse Practitioner  Division of Endocrinology & Diabetes  Pager # [phone]      If after 6PM or before 9AM, or on weekends/holidays, please call endocrine answering service for assistance ([phone]).  For nonurgent matters email [email or web address] for assistance.

## 2022-09-03 NOTE — PROGRESS NOTE ADULT - SUBJECTIVE AND OBJECTIVE BOX
PULMONARY PROGRESS NOTE    DAMARI GUERRERO  MRN-[id number]    Patient is a 72y old  Female who presents with a chief complaint of lightheadedness and fatigue for 5 days (03 Sep 2022 16:08)      HPI:  -tolerating prednisone  feels groggy  on 02 -    ROS:   -    ACTIVE MEDICATION LIST:  MEDICATIONS  (STANDING):  atorvastatin 10 milliGRAM(s) Oral at bedtime  budesonide 160 MICROgram(s)/formoterol 4.5 MICROgram(s) Inhaler 2 Puff(s) Inhalation two times a day  buMETAnide Injectable 2 milliGRAM(s) IV Push once  dextrose 5%. 1000 milliLiter(s) (100 mL/Hr) IV Continuous <Continuous>  dextrose 5%. 1000 milliLiter(s) (50 mL/Hr) IV Continuous <Continuous>  dextrose 50% Injectable 25 Gram(s) IV Push once  dextrose 50% Injectable 12.5 Gram(s) IV Push once  dextrose 50% Injectable 25 Gram(s) IV Push once  ferrous    sulfate 325 milliGRAM(s) Oral daily  glucagon  Injectable 1 milliGRAM(s) IntraMuscular once  heparin   Injectable 5000 Unit(s) SubCutaneous every 8 hours  hydrALAZINE 10 milliGRAM(s) Oral three times a day  insulin glargine Injectable (LANTUS) 48 Unit(s) SubCutaneous at bedtime  insulin lispro (ADMELOG) corrective regimen sliding scale   SubCutaneous Before meals and at bedtime  insulin lispro Injectable (ADMELOG) 24 Unit(s) SubCutaneous three times a day before meals  levalbuterol Inhalation 0.63 milliGRAM(s) Inhalation every 6 hours  lidocaine   4% Patch 1 Patch Transdermal daily  meclizine 12.5 milliGRAM(s) Oral three times a day  metoprolol succinate ER 50 milliGRAM(s) Oral daily  pantoprazole    Tablet 40 milliGRAM(s) Oral before breakfast    MEDICATIONS  (PRN):  acetaminophen     Tablet .. 650 milliGRAM(s) Oral every 6 hours PRN Mild Pain (1 - 3), Moderate Pain (4 - 6), Severe Pain (7 - 10)  ALBUTerol    90 MICROgram(s) HFA Inhaler 2 Puff(s) Inhalation every 6 hours PRN Shortness of Breath and/or Wheezing  dextrose Oral Gel 15 Gram(s) Oral once PRN Blood Glucose LESS THAN 70 milliGRAM(s)/deciliter      EXAM:  Vital Signs Last 24 Hrs  T(C): 36.3 (03 Sep 2022 09:10), Max: 36.7 (03 Sep 2022 06:00)  T(F): 97.4 (03 Sep 2022 09:10), Max: 98 (03 Sep 2022 06:00)  HR: 52 (03 Sep 2022 09:23) (52 - 81)  BP: 109/52 (03 Sep 2022 09:10) (109/52 - 145/78)  BP(mean): --  RR: 18 (03 Sep 2022 09:10) (18 - 19)  SpO2: 93% (03 Sep 2022 09:23) (93% - 100%)    Parameters below as of 03 Sep 2022 09:23  Patient On (Oxygen Delivery Method): nasal cannula        GENERAL: The patient is awake and alert in no apparent distress.     LUNGS: Clear to auscultation without wheezing, rales or rhonchi; respirations unlabored                               9.8    9.74  )-----------( 278      ( 03 Sep 2022 05:41 )             34.2       09-03    141  |  96<L>  |  106<H>  ----------------------------<  200<H>  4.8   |  34<H>  |  1.64<H>    Ca    10.5      03 Sep 2022 05:41  Phos  3.3     09-03  Mg     2.30     09-03       < from: Xray Chest 1 View- PORTABLE-Urgent (Xray Chest 1 View- PORTABLE-Urgent .) (08.29.22 @ 09:49) >    ACC: [id number] EXAM:  XR CHEST PORTABLE URGENT 1V                        ACC: [id number] EXAM:  XR CHEST PORTABLE URGENT 1V                          PROCEDURE DATE:  08/29/2022          INTERPRETATION:  CLINICAL INFORMATION: Dyspnea    TIME OF EXAMINATION: August 27, 2022 at 1:24 PM and August 29 at 8:45 AM    EXAM: Portable chest    FINDINGS:  August 27 at 1:24 PM:  There are no focal consolidations. Body habitus limits evaluation. There   is a perihilar haze may be overlying soft tissues versus mild edema.   Heart appears enlarged but stable. Sternotomy wires are present.    August 29 at 8:45 AM:  No interval change. Perihilar haze with mild interstitial prominence   consistent with CHF. No focal consolidations to suggest pneumonia. No   pneumothorax.    COMPARISON: August 23      IMPRESSION:  *  No focal consolidations.  *  Mild pulmonary edema.    --- End of Report ---            DELIA PEDERSEN MD; Attending Radiologist  This document has been electronically signed. Aug 29 2022 11:04AM    < end of copied text >      PROBLEM LIST:  72y Female with HEALTH ISSUES - PROBLEM Dx:  Hyperglycemia due to diabetes mellitus    Chronic diastolic heart failure    MIGUEL treated with BiPAP    COPD, severe    Chronic atrial fibrillation    Need for prophylactic measure    HLD (hyperlipidemia)    Diabetes mellitus, new onset    Hypertension    Acute kidney injury superimposed on CKD    Steroid-induced hyperglycemia      RECS:  prednisone taper  02  avaps  consider repeating xray once renal feels shes undergone enough diuresis  dvt prophylaxis      Please call with any questions.    Irma Eaton DO  [facility] Pulmonary/Sleep Medicine  [phone]

## 2022-09-03 NOTE — PROGRESS NOTE ADULT - SUBJECTIVE AND OBJECTIVE BOX
PATIENT SEEN AND EXAMINED ON :- 9/3/22  DATE OF SERVICE:   9/3/22          Interim events noted,Labs ,Radiological studies and Cardiology tests reviewed .       HOSPITAL COURSE: HPI:  73 y/o F with pmhx of COPD on home O2(3L) with severe pHTN, OHS/MIGUEL on home biPAP, HTN, HLD who presents with 5 days of lightheadedness and fatigue. Patient states that she has been feeling off for the last few days .Endorse increased fatigue stating she feels "very slow".  She state that at times she feels like the room is spinning.  She denies any fevers, chills, headaches chest pain or increased cough. She has SOB at baseline and uses 3L O2 continuously she denies any increased SOB. She endorse vision changes, stating that "everything seems darker even when there is light outside". She denies any blurry vision or loss of vision. She also endorse polydipsia nad polyuria. Denies polyphagia but does endorse decreased appetite. She states that she sleeps with 3 pillows at night which is normal for her. Also endorse nocturnal dyspnea that has improved since she has been using her bipap at night.   Patient states that she has been using prednisone intermittently for the last month for SOB. She states she was prescribed 10mg BID and states she only takes the medication when she is feeling more dyspnea that her baseline. She states her last dose was 2 days ago. She states that her symptoms progressively worsened to the pint that she felt helpless and decided to call EMS. In EMS her sugars were >500.     In the ED T. BS were measures >600 x2. She was given 5 units of insulin with improvement to 300s. BHB negative. Admitted to medicine for further management.     (24 Aug 2022 08:35)      INTERIM EVENTS:Patient seen at bedside ,interim events noted.      PMH -reviewed admission note, no change since admission  HEART FAILURE: Acute[ ]Chronic[ ] Systolic[ ] Diastolic[ ] Combined Systolic and Diastolic[ ]  CAD[ ] CABG[ ] PCI[ ]  DEVICES[ ] PPM[ ] ICD[ ] ILR[ ]  ATRIAL FIBRILLATION[ ] Paroxysmal[ ] Permanent[ ] CHADS2-[  ]  MISAEL[ ] CKD1[ ] CKD2[ ] CKD3[ ] CKD4[ ] ESRD[ ]  COPD[ ] HTN[ ]   DM[ ] Type1[ ] Type 2[ ]   CVA[ ] Paresis[ ]    AMBULATION: Assisted[ ] Cane/walker[ ] Independent[ ]    MEDICATIONS  (STANDING):  atorvastatin 10 milliGRAM(s) Oral at bedtime  budesonide 160 MICROgram(s)/formoterol 4.5 MICROgram(s) Inhaler 2 Puff(s) Inhalation two times a day  dextrose 5%. 1000 milliLiter(s) (100 mL/Hr) IV Continuous <Continuous>  dextrose 5%. 1000 milliLiter(s) (50 mL/Hr) IV Continuous <Continuous>  dextrose 50% Injectable 25 Gram(s) IV Push once  dextrose 50% Injectable 12.5 Gram(s) IV Push once  dextrose 50% Injectable 25 Gram(s) IV Push once  ferrous    sulfate 325 milliGRAM(s) Oral daily  glucagon  Injectable 1 milliGRAM(s) IntraMuscular once  heparin   Injectable 5000 Unit(s) SubCutaneous every 8 hours  hydrALAZINE 10 milliGRAM(s) Oral three times a day  insulin glargine Injectable (LANTUS) 48 Unit(s) SubCutaneous at bedtime  insulin lispro (ADMELOG) corrective regimen sliding scale   SubCutaneous Before meals and at bedtime  insulin lispro Injectable (ADMELOG) 24 Unit(s) SubCutaneous three times a day before meals  levalbuterol Inhalation 0.63 milliGRAM(s) Inhalation every 6 hours  lidocaine   4% Patch 1 Patch Transdermal daily  meclizine 12.5 milliGRAM(s) Oral three times a day  metoprolol succinate ER 50 milliGRAM(s) Oral daily  pantoprazole    Tablet 40 milliGRAM(s) Oral before breakfast    MEDICATIONS  (PRN):  acetaminophen     Tablet .. 650 milliGRAM(s) Oral every 6 hours PRN Mild Pain (1 - 3), Moderate Pain (4 - 6), Severe Pain (7 - 10)  ALBUTerol    90 MICROgram(s) HFA Inhaler 2 Puff(s) Inhalation every 6 hours PRN Shortness of Breath and/or Wheezing  dextrose Oral Gel 15 Gram(s) Oral once PRN Blood Glucose LESS THAN 70 milliGRAM(s)/deciliter            REVIEW OF SYSTEMS:  Constitutional: [ ] fever, [ ]weight loss,  [ ]fatigue [ ]weight gain  Eyes: [ ] visual changes  Respiratory: [ ]shortness of breath;  [ ] cough, [ ]wheezing, [ ]chills, [ ]hemoptysis  Cardiovascular: [ ] chest pain, [ ]palpitations, [ ]dizziness,  [ ]leg swelling[ ]orthopnea[ ]PND  Gastrointestinal: [ ] abdominal pain, [ ]nausea, [ ]vomiting,  [ ]diarrhea [ ]Constipation [ ]Melena  Genitourinary: [ ] dysuria, [ ] hematuria [ ]Junior  Neurologic: [ ] headaches [ ] tremors[ ]weakness [ ]Paralysis Right[ ] Left[ ]  Skin: [ ] itching, [ ]burning, [ ] rashes  Endocrine: [ ] heat or cold intolerance  Musculoskeletal: [ ] joint pain or swelling; [ ] muscle, back, or extremity pain  Psychiatric: [ ] depression, [ ]anxiety, [ ]mood swings, or [ ]difficulty sleeping  Hematologic: [ ] easy bruising, [ ] bleeding gums    [ ] All remaining systems negative except as per above.   [ ]Unable to obtain.  [x] No change in ROS since admission      Vital Signs Last 24 Hrs  T(C): 36.3 (03 Sep 2022 09:10), Max: 36.7 (03 Sep 2022 06:00)  T(F): 97.4 (03 Sep 2022 09:10), Max: 98 (03 Sep 2022 06:00)  HR: 74 (03 Sep 2022 21:03) (52 - 81)  BP: 109/52 (03 Sep 2022 09:10) (109/52 - 145/78)  BP(mean): --  RR: 18 (03 Sep 2022 09:10) (18 - 19)  SpO2: 99% (03 Sep 2022 21:03) (93% - 100%)    Parameters below as of 03 Sep 2022 21:03  Patient On (Oxygen Delivery Method): nasal cannula      I&O's Summary      PHYSICAL EXAM:  General: No acute distress BMI-  HEENT: EOMI, PERRL  Neck: Supple, [ ] JVD  Lungs: Equal air entry bilaterally; [ ] rales [ ] wheezing [ ] rhonchi  Heart: Regular rate and rhythm; [x ] murmur   2/6 [ x] systolic [ ] diastolic [ ] radiation[ ] rubs [ ]  gallops  Abdomen: Nontender, bowel sounds present  Extremities: No clubbing, cyanosis, [ ] edema [ ]Pulses  equal and intact  Nervous system:  Alert & Oriented X3, no focal deficits  Psychiatric: Normal affect  Skin: No rashes or lesions    LABS:  09-03    141  |  96<L>  |  106<H>  ----------------------------<  200<H>  4.8   |  34<H>  |  1.64<H>    Ca    10.5      03 Sep 2022 05:41  Phos  3.3     09-03  Mg     2.30     09-03      Creatinine Trend: 1.64<--, 1.54<--, 1.69<--, 1.65<--, 1.65<--, 1.85<--                        9.8    9.74  )-----------( 278      ( 03 Sep 2022 05:41 )             34.2

## 2022-09-03 NOTE — PROGRESS NOTE ADULT - SUBJECTIVE AND OBJECTIVE BOX
DAMARI GUERRERO  72y  Female      Patient is a 72y old  Female who presents with a chief complaint of lightheadedness and fatigue for 5 days (03 Sep 2022 22:25)  comfortable,nad.no sob,no cp    REVIEW OF SYSTEMS:  CONSTITUTIONAL: No fever  RESPIRATORY: No cough, hemoptysis or shortness of breath  CARDIOVASCULAR: No chest pain, palpitations, dizziness, or leg swelling  GASTROINTESTINAL: No abdominal pain. nausea, vomiting, hematemesis  INTERVAL HPI/OVERNIGHT EVENTS:  T(C): 36.3 (09-03-22 @ 09:10), Max: 36.7 (09-03-22 @ 06:00)  HR: 74 (09-03-22 @ 21:03) (52 - 81)  BP: 109/52 (09-03-22 @ 09:10) (109/52 - 145/78)  RR: 18 (09-03-22 @ 09:10) (18 - 19)  SpO2: 99% (09-03-22 @ 21:03) (93% - 100%)  Wt(kg): --  I&O's Summary    T(C): 36.3 (09-03-22 @ 09:10), Max: 36.7 (09-03-22 @ 06:00)  HR: 74 (09-03-22 @ 21:03) (52 - 81)  BP: 109/52 (09-03-22 @ 09:10) (109/52 - 145/78)  RR: 18 (09-03-22 @ 09:10) (18 - 19)  SpO2: 99% (09-03-22 @ 21:03) (93% - 100%)  Wt(kg): --Vital Signs Last 24 Hrs  T(C): 36.3 (03 Sep 2022 09:10), Max: 36.7 (03 Sep 2022 06:00)  T(F): 97.4 (03 Sep 2022 09:10), Max: 98 (03 Sep 2022 06:00)  HR: 74 (03 Sep 2022 21:03) (52 - 81)  BP: 109/52 (03 Sep 2022 09:10) (109/52 - 145/78)  BP(mean): --  RR: 18 (03 Sep 2022 09:10) (18 - 19)  SpO2: 99% (03 Sep 2022 21:03) (93% - 100%)    Parameters below as of 03 Sep 2022 21:03  Patient On (Oxygen Delivery Method): nasal cannula        LABS:                        9.8    9.74  )-----------( 278      ( 03 Sep 2022 05:41 )             34.2     09-03    141  |  96<L>  |  106<H>  ----------------------------<  200<H>  4.8   |  34<H>  |  1.64<H>    Ca    10.5      03 Sep 2022 05:41  Phos  3.3     09-03  Mg     2.30     09-03          CAPILLARY BLOOD GLUCOSE      POCT Blood Glucose.: 373 mg/dL (03 Sep 2022 21:32)  POCT Blood Glucose.: 287 mg/dL (03 Sep 2022 17:10)  POCT Blood Glucose.: 245 mg/dL (03 Sep 2022 11:28)  POCT Blood Glucose.: 187 mg/dL (03 Sep 2022 07:25)  POCT Blood Glucose.: 401 mg/dL (03 Sep 2022 01:12)            PAST MEDICAL & SURGICAL HISTORY:  Atrial fibrillation  S/P Ablation      Pulmonary hypertension      MIGUEL (obstructive sleep apnea)      COPD (chronic obstructive pulmonary disease)  on home O2      CHF (congestive heart failure)      HTN (hypertension)      Gout      Mitral valve replaced      Tricuspid valve replaced      CHF (congestive heart failure)      Hypertension      COPD (chronic obstructive pulmonary disease)      History of mitral valve replacement with mechanical valve      Tricuspid valve replaced  mechanical          MEDICATIONS  (STANDING):  atorvastatin 10 milliGRAM(s) Oral at bedtime  budesonide 160 MICROgram(s)/formoterol 4.5 MICROgram(s) Inhaler 2 Puff(s) Inhalation two times a day  dextrose 5%. 1000 milliLiter(s) (100 mL/Hr) IV Continuous <Continuous>  dextrose 5%. 1000 milliLiter(s) (50 mL/Hr) IV Continuous <Continuous>  dextrose 50% Injectable 25 Gram(s) IV Push once  dextrose 50% Injectable 12.5 Gram(s) IV Push once  dextrose 50% Injectable 25 Gram(s) IV Push once  ferrous    sulfate 325 milliGRAM(s) Oral daily  glucagon  Injectable 1 milliGRAM(s) IntraMuscular once  heparin   Injectable 5000 Unit(s) SubCutaneous every 8 hours  hydrALAZINE 10 milliGRAM(s) Oral three times a day  insulin glargine Injectable (LANTUS) 48 Unit(s) SubCutaneous at bedtime  insulin lispro (ADMELOG) corrective regimen sliding scale   SubCutaneous Before meals and at bedtime  insulin lispro Injectable (ADMELOG) 24 Unit(s) SubCutaneous three times a day before meals  levalbuterol Inhalation 0.63 milliGRAM(s) Inhalation every 6 hours  lidocaine   4% Patch 1 Patch Transdermal daily  meclizine 12.5 milliGRAM(s) Oral three times a day  metoprolol succinate ER 50 milliGRAM(s) Oral daily  pantoprazole    Tablet 40 milliGRAM(s) Oral before breakfast    MEDICATIONS  (PRN):  acetaminophen     Tablet .. 650 milliGRAM(s) Oral every 6 hours PRN Mild Pain (1 - 3), Moderate Pain (4 - 6), Severe Pain (7 - 10)  ALBUTerol    90 MICROgram(s) HFA Inhaler 2 Puff(s) Inhalation every 6 hours PRN Shortness of Breath and/or Wheezing  dextrose Oral Gel 15 Gram(s) Oral once PRN Blood Glucose LESS THAN 70 milliGRAM(s)/deciliter        RADIOLOGY & ADDITIONAL TESTS:    Imaging Personally Reviewed:  [ ] YES  [ ] NO    Consultant(s) Notes Reviewed:  [x ] YES  [ ] NO    PHYSICAL EXAM:  GENERAL: Alert and awake lying in bed in no distress  HEAD:  Atraumatic, Normocephalic  EYES: EOMI, PADMINI, conjunctiva and sclera clear  NECK: Supple, No JVD, Normal thyroid  NERVOUS SYSTEM:  Alert & Oriented X3, Motor and sensory systems are intact,   CHEST/LUNG: Bilateral clear breath sounds, no rhochi, no wheezing, no crepitations,  HEART: Regular rate and rhythm; No murmurs, rubs, or gallops  ABDOMEN: Soft, Nontender, Nondistended; Bowel sounds present  EXTREMITIES:   Peripheral Pulses are palpable, no  edema        Care Discussed with Consultants/Other Providers [ ] YES  [ ] NO      Code Status: [] Full Code [] DNR [] DNI [] Goals of Care:   Disposition: [] ICU [] Stroke Unit [] RCU []PCU []Floor [] Discharge Home         BARRETT Allen

## 2022-09-03 NOTE — PROGRESS NOTE ADULT - SUBJECTIVE AND OBJECTIVE BOX
Full note to follow. [facility] NEPHROLOGY- PROGRESS NOTE    72 year old Female with history of COPD, CHF presents with weakness. Nephrology consulted for elevated Scr.    REVIEW OF SYSTEMS:  Gen: no fevers, + dizziness this morning  Cards: no chest pain  Resp: + dyspnea   GI: no nausea or vomiting or diarrhea  Vascular: + LE edema    No Known Allergies      Hospital Medications: Medications reviewed      VITALS:  T(F): 97.4 (22 @ 09:10), Max: 98 (22 @ 06:00)  HR: 52 (22 @ 09:23)  BP: 109/52 (22 @ 09:10)  RR: 18 (22 @ 09:10)  SpO2: 93% (22 @ 09:23)      PHYSICAL EXAM:  Gen: NAD, calm  Cards: Irregularly irregular, +S1/S2, no M/G/R  Resp: L basilar rales?  GI: soft, NT/ND, NABS  Vascular: 1+ B LE edema worsening      LABS:      141  |  96<L>  |  106<H>  ----------------------------<  200<H>  4.8   |  34<H>  |  1.64<H>    Ca    10.5      03 Sep 2022 05:41  Phos  3.3       Mg     2.30           Creatinine Trend: 1.64 <--, 1.54 <--, 1.69 <--, 1.65 <--, 1.65 <--, 1.85 <--, 1.79 <--, 1.81 <--                        9.8    9.74  )-----------( 278      ( 03 Sep 2022 05:41 )             34.2     Urine Studies:  Urinalysis Basic - ( 29 Aug 2022 09:05 )    Color: Light Yellow / Appearance: Clear / S.011 / pH:   Gluc:  / Ketone: Negative  / Bili: Negative / Urobili: <2 mg/dL   Blood:  / Protein: Negative / Nitrite: Negative   Leuk Esterase: Negative / RBC: N/A /HPF / WBC N/A /HPF   Sq Epi:  / Non Sq Epi:  / Bacteria:       Creatinine, Random Urine: 44 mg/dL ( @ 09:05)

## 2022-09-04 LAB
ANION GAP SERPL CALC-SCNC: 11 MMOL/L — SIGNIFICANT CHANGE UP (ref 7–14)
BUN SERPL-MCNC: 120 MG/DL — HIGH (ref 7–23)
CALCIUM SERPL-MCNC: 10.4 MG/DL — SIGNIFICANT CHANGE UP (ref 8.4–10.5)
CHLORIDE SERPL-SCNC: 92 MMOL/L — LOW (ref 98–107)
CO2 SERPL-SCNC: 33 MMOL/L — HIGH (ref 22–31)
CREAT SERPL-MCNC: 1.86 MG/DL — HIGH (ref 0.5–1.3)
EGFR: 28 ML/MIN/1.73M2 — LOW
GLUCOSE BLDC GLUCOMTR-MCNC: 121 MG/DL — HIGH (ref 70–99)
GLUCOSE BLDC GLUCOMTR-MCNC: 257 MG/DL — HIGH (ref 70–99)
GLUCOSE BLDC GLUCOMTR-MCNC: 317 MG/DL — HIGH (ref 70–99)
GLUCOSE BLDC GLUCOMTR-MCNC: 329 MG/DL — HIGH (ref 70–99)
GLUCOSE SERPL-MCNC: 120 MG/DL — HIGH (ref 70–99)
HCT VFR BLD CALC: 35.1 % — SIGNIFICANT CHANGE UP (ref 34.5–45)
HGB BLD-MCNC: 9.9 G/DL — LOW (ref 11.5–15.5)
MAGNESIUM SERPL-MCNC: 2.3 MG/DL — SIGNIFICANT CHANGE UP (ref 1.6–2.6)
MCHC RBC-ENTMCNC: 28.2 GM/DL — LOW (ref 32–36)
MCHC RBC-ENTMCNC: 29.3 PG — SIGNIFICANT CHANGE UP (ref 27–34)
MCV RBC AUTO: 103.8 FL — HIGH (ref 80–100)
NRBC # BLD: 0 /100 WBCS — SIGNIFICANT CHANGE UP (ref 0–0)
NRBC # FLD: 0 K/UL — SIGNIFICANT CHANGE UP (ref 0–0)
PHOSPHATE SERPL-MCNC: 3.1 MG/DL — SIGNIFICANT CHANGE UP (ref 2.5–4.5)
PLATELET # BLD AUTO: 310 K/UL — SIGNIFICANT CHANGE UP (ref 150–400)
POTASSIUM SERPL-MCNC: 4.3 MMOL/L — SIGNIFICANT CHANGE UP (ref 3.5–5.3)
POTASSIUM SERPL-SCNC: 4.3 MMOL/L — SIGNIFICANT CHANGE UP (ref 3.5–5.3)
RBC # BLD: 3.38 M/UL — LOW (ref 3.8–5.2)
RBC # FLD: 14 % — SIGNIFICANT CHANGE UP (ref 10.3–14.5)
SODIUM SERPL-SCNC: 136 MMOL/L — SIGNIFICANT CHANGE UP (ref 135–145)
WBC # BLD: 12.69 K/UL — HIGH (ref 3.8–10.5)
WBC # FLD AUTO: 12.69 K/UL — HIGH (ref 3.8–10.5)

## 2022-09-04 PROCEDURE — 99232 SBSQ HOSP IP/OBS MODERATE 35: CPT

## 2022-09-04 RX ORDER — INSULIN GLARGINE 100 [IU]/ML
40 INJECTION, SOLUTION SUBCUTANEOUS AT BEDTIME
Refills: 0 | Status: DISCONTINUED | OUTPATIENT
Start: 2022-09-04 | End: 2022-09-05

## 2022-09-04 RX ORDER — INSULIN LISPRO 100/ML
18 VIAL (ML) SUBCUTANEOUS
Refills: 0 | Status: DISCONTINUED | OUTPATIENT
Start: 2022-09-04 | End: 2022-09-06

## 2022-09-04 RX ADMIN — HEPARIN SODIUM 5000 UNIT(S): 5000 INJECTION INTRAVENOUS; SUBCUTANEOUS at 23:19

## 2022-09-04 RX ADMIN — Medication 6: at 11:47

## 2022-09-04 RX ADMIN — ALBUTEROL 2 PUFF(S): 90 AEROSOL, METERED ORAL at 19:37

## 2022-09-04 RX ADMIN — LEVALBUTEROL 0.63 MILLIGRAM(S): 1.25 SOLUTION, CONCENTRATE RESPIRATORY (INHALATION) at 15:35

## 2022-09-04 RX ADMIN — LIDOCAINE 1 PATCH: 4 CREAM TOPICAL at 11:50

## 2022-09-04 RX ADMIN — HEPARIN SODIUM 5000 UNIT(S): 5000 INJECTION INTRAVENOUS; SUBCUTANEOUS at 16:06

## 2022-09-04 RX ADMIN — Medication 50 MILLIGRAM(S): at 05:53

## 2022-09-04 RX ADMIN — Medication 325 MILLIGRAM(S): at 11:50

## 2022-09-04 RX ADMIN — PANTOPRAZOLE SODIUM 40 MILLIGRAM(S): 20 TABLET, DELAYED RELEASE ORAL at 05:53

## 2022-09-04 RX ADMIN — ATORVASTATIN CALCIUM 10 MILLIGRAM(S): 80 TABLET, FILM COATED ORAL at 22:23

## 2022-09-04 RX ADMIN — LIDOCAINE 1 PATCH: 4 CREAM TOPICAL at 19:43

## 2022-09-04 RX ADMIN — Medication 24 UNIT(S): at 08:39

## 2022-09-04 RX ADMIN — Medication 18 UNIT(S): at 11:46

## 2022-09-04 RX ADMIN — HEPARIN SODIUM 5000 UNIT(S): 5000 INJECTION INTRAVENOUS; SUBCUTANEOUS at 05:53

## 2022-09-04 RX ADMIN — Medication 10 MILLIGRAM(S): at 16:06

## 2022-09-04 RX ADMIN — LEVALBUTEROL 0.63 MILLIGRAM(S): 1.25 SOLUTION, CONCENTRATE RESPIRATORY (INHALATION) at 04:42

## 2022-09-04 RX ADMIN — LEVALBUTEROL 0.63 MILLIGRAM(S): 1.25 SOLUTION, CONCENTRATE RESPIRATORY (INHALATION) at 20:37

## 2022-09-04 RX ADMIN — BUMETANIDE 2 MILLIGRAM(S): 0.25 INJECTION INTRAMUSCULAR; INTRAVENOUS at 06:05

## 2022-09-04 RX ADMIN — Medication 8: at 17:00

## 2022-09-04 RX ADMIN — Medication 8: at 22:29

## 2022-09-04 RX ADMIN — LIDOCAINE 1 PATCH: 4 CREAM TOPICAL at 08:02

## 2022-09-04 RX ADMIN — Medication 40 MILLIGRAM(S): at 05:51

## 2022-09-04 RX ADMIN — BUDESONIDE AND FORMOTEROL FUMARATE DIHYDRATE 2 PUFF(S): 160; 4.5 AEROSOL RESPIRATORY (INHALATION) at 22:22

## 2022-09-04 RX ADMIN — Medication 10 MILLIGRAM(S): at 05:53

## 2022-09-04 RX ADMIN — LIDOCAINE 1 PATCH: 4 CREAM TOPICAL at 23:24

## 2022-09-04 RX ADMIN — LIDOCAINE 1 PATCH: 4 CREAM TOPICAL at 08:01

## 2022-09-04 RX ADMIN — Medication 18 UNIT(S): at 17:00

## 2022-09-04 RX ADMIN — Medication 10 MILLIGRAM(S): at 23:20

## 2022-09-04 RX ADMIN — INSULIN GLARGINE 40 UNIT(S): 100 INJECTION, SOLUTION SUBCUTANEOUS at 22:27

## 2022-09-04 RX ADMIN — LEVALBUTEROL 0.63 MILLIGRAM(S): 1.25 SOLUTION, CONCENTRATE RESPIRATORY (INHALATION) at 09:47

## 2022-09-04 RX ADMIN — BUDESONIDE AND FORMOTEROL FUMARATE DIHYDRATE 2 PUFF(S): 160; 4.5 AEROSOL RESPIRATORY (INHALATION) at 08:54

## 2022-09-04 NOTE — PROGRESS NOTE ADULT - ASSESSMENT
73 y/o F w/ COPD on home O2(3L) with severe pHTN, OHS/MIGUEL on home biPAP, HTN, HLD admitted for hyperglycemia >600 with c/f HHS.  Endocrine consulted for management of hyperglycemia/new T2DM.  Patient does not meet full criteria for HHS (serum glc >600, plasma osmolality >320, AMS), and is responding adequately to insulin but remains with hyperglycemia, exacerbated by steroids     1. DM 2 with hyperglycemia  Admitted with HHS  Now with steroid induced hyperglycemia - on Solumedrol     While inpatient:  BG target 100-180 mg/dl   Lower Lantus to 40 units sq qhs  Lower Admelog to 18 units sq tid ac- hold for NPO  Continue Admelog MODERATE dose correctional scales before meals and bedtime  Consistent carbohydrate diet, Nutrition consult (completed)  Check BG before meals and bedtime  Hypoglycemia protocol   Expect insulin requirements to decrease as steroid doses are tapered.  For now, will continue current doses  Provider to RN: Insulin pen administration and glucometer teaching prior to discharge. Please send glucometer to [facility] and teach pt with her own glucometer prior to discharge.    Discharge Plan:  Levemir and Novolog PENS with dosing TBD   Discuss glycemic goal of a1c <7% to prevent microvascular complications of diabetes mellitus and reduce the risk of macrovascular complications.  Make sure patient knows how to inject insulin and check fingersticks with glucometer (ask bedside RN for teaching)  Patient's  takes Levemir and Novolog at home and patient reports he will assist her  Ensure patient has working glucometer, test strips, lancets, alcohol pads, and BD sharan pen needles  Please also prescribe glucose tabs, Baqsimi nasal spray or glucagon emergency kit for hypoglycemia risk   Pt should call PMD for DM related questions or concerns until pt is seen by CDE or endocrine   Recommend PCP, endocrinology, podiatry and ophthalmology follow up  Followup scheduled with [facility]: [facility], [address] [place] [address], [address], [place] [address], [phone]   1. With Nurse Practitioner: Linda 9/9/22 at 1:30 PM   2. Endocrinologist: Dr. Henderson 11/14/22 12:20 PM    2. Steroid use  Patient intermittently using prednisone 10mg for SOB, last dose 2 days ago.  Restarted on Solumedrol, 40 mg BID and starting prednisone taper tomorrow  Steroid induced hyperglycemia - see insulin adjustments as above  Notify endocrine with any updates to steroid plan     3. HTN  BP less than 130/80  On Hydralazine, Metoprolol, Bumex   Further titration per primary team    4. HLD  On Atorvastatin 10mg daily  LDL is not at goal of less than 70 (currently 110), would suggest increasing statin therapy if no contraindication  Defer management decision to primary team     Jamila Marroquin  Nurse Practitioner  Division of Endocrinology & Diabetes  Pager # [phone]      If after 6PM or before 9AM, or on weekends/holidays, please call endocrine answering service for assistance ([phone]).  For nonurgent matters email [email or web address] for assistance.

## 2022-09-04 NOTE — PROGRESS NOTE ADULT - SUBJECTIVE AND OBJECTIVE BOX
DAMARI GUERRERO  72y  Female      Patient is a 72y old  Female who presents with a chief complaint of lightheadedness and fatigue for 5 days (04 Sep 2022 17:27)  comfortable,nad,,no cp,no sob,no fever    REVIEW OF SYSTEMS:  CONSTITUTIONAL: No fever  RESPIRATORY: No cough, hemoptysis or shortness of breath  CARDIOVASCULAR: No chest pain, palpitations, dizziness, or leg swelling  GASTROINTESTINAL: No abdominal pain. nausea, vomiting, hematemesis  INTERVAL HPI/OVERNIGHT EVENTS:  T(C): 36.7 (09-04-22 @ 18:10), Max: 36.7 (09-04-22 @ 16:05)  HR: 78 (09-04-22 @ 18:10) (65 - 92)  BP: 130/75 (09-04-22 @ 18:10) (123/60 - 146/74)  RR: 18 (09-04-22 @ 18:10) (17 - 18)  SpO2: 100% (09-04-22 @ 18:10) (94% - 100%)  Wt(kg): --  I&O's Summary    03 Sep 2022 07:01  -  04 Sep 2022 07:00  --------------------------------------------------------  IN: 0 mL / OUT: 800 mL / NET: -800 mL      T(C): 36.7 (09-04-22 @ 18:10), Max: 36.7 (09-04-22 @ 16:05)  HR: 78 (09-04-22 @ 18:10) (65 - 92)  BP: 130/75 (09-04-22 @ 18:10) (123/60 - 146/74)  RR: 18 (09-04-22 @ 18:10) (17 - 18)  SpO2: 100% (09-04-22 @ 18:10) (94% - 100%)  Wt(kg): --Vital Signs Last 24 Hrs  T(C): 36.7 (04 Sep 2022 18:10), Max: 36.7 (04 Sep 2022 16:05)  T(F): 98 (04 Sep 2022 18:10), Max: 98 (04 Sep 2022 16:05)  HR: 78 (04 Sep 2022 18:10) (65 - 92)  BP: 130/75 (04 Sep 2022 18:10) (123/60 - 146/74)  BP(mean): --  RR: 18 (04 Sep 2022 18:10) (17 - 18)  SpO2: 100% (04 Sep 2022 18:10) (94% - 100%)    Parameters below as of 04 Sep 2022 18:10  Patient On (Oxygen Delivery Method): room air        LABS:                        9.9    12.69 )-----------( 310      ( 04 Sep 2022 05:48 )             35.1     09-04    136  |  92<L>  |  120<H>  ----------------------------<  120<H>  4.3   |  33<H>  |  1.86<H>    Ca    10.4      04 Sep 2022 05:48  Phos  3.1     09-04  Mg     2.30     09-04          CAPILLARY BLOOD GLUCOSE      POCT Blood Glucose.: 329 mg/dL (04 Sep 2022 16:39)  POCT Blood Glucose.: 257 mg/dL (04 Sep 2022 11:27)  POCT Blood Glucose.: 121 mg/dL (04 Sep 2022 08:06)  POCT Blood Glucose.: 373 mg/dL (03 Sep 2022 21:32)            PAST MEDICAL & SURGICAL HISTORY:  Atrial fibrillation  S/P Ablation      Pulmonary hypertension      MIGUEL (obstructive sleep apnea)      COPD (chronic obstructive pulmonary disease)  on home O2      CHF (congestive heart failure)      HTN (hypertension)      Gout      Mitral valve replaced      Tricuspid valve replaced      CHF (congestive heart failure)      Hypertension      COPD (chronic obstructive pulmonary disease)      History of mitral valve replacement with mechanical valve      Tricuspid valve replaced  mechanical          MEDICATIONS  (STANDING):  atorvastatin 10 milliGRAM(s) Oral at bedtime  budesonide 160 MICROgram(s)/formoterol 4.5 MICROgram(s) Inhaler 2 Puff(s) Inhalation two times a day  dextrose 5%. 1000 milliLiter(s) (50 mL/Hr) IV Continuous <Continuous>  dextrose 5%. 1000 milliLiter(s) (100 mL/Hr) IV Continuous <Continuous>  dextrose 50% Injectable 25 Gram(s) IV Push once  dextrose 50% Injectable 12.5 Gram(s) IV Push once  dextrose 50% Injectable 25 Gram(s) IV Push once  ferrous    sulfate 325 milliGRAM(s) Oral daily  glucagon  Injectable 1 milliGRAM(s) IntraMuscular once  heparin   Injectable 5000 Unit(s) SubCutaneous every 8 hours  hydrALAZINE 10 milliGRAM(s) Oral three times a day  insulin glargine Injectable (LANTUS) 40 Unit(s) SubCutaneous at bedtime  insulin lispro (ADMELOG) corrective regimen sliding scale   SubCutaneous Before meals and at bedtime  insulin lispro Injectable (ADMELOG) 18 Unit(s) SubCutaneous three times a day before meals  levalbuterol Inhalation 0.63 milliGRAM(s) Inhalation every 6 hours  lidocaine   4% Patch 1 Patch Transdermal daily  meclizine 12.5 milliGRAM(s) Oral three times a day  metoprolol succinate ER 50 milliGRAM(s) Oral daily  pantoprazole    Tablet 40 milliGRAM(s) Oral before breakfast    MEDICATIONS  (PRN):  acetaminophen     Tablet .. 650 milliGRAM(s) Oral every 6 hours PRN Mild Pain (1 - 3), Moderate Pain (4 - 6), Severe Pain (7 - 10)  ALBUTerol    90 MICROgram(s) HFA Inhaler 2 Puff(s) Inhalation every 6 hours PRN Shortness of Breath and/or Wheezing  dextrose Oral Gel 15 Gram(s) Oral once PRN Blood Glucose LESS THAN 70 milliGRAM(s)/deciliter        RADIOLOGY & ADDITIONAL TESTS:    Imaging Personally Reviewed:  [ ] YES  [ ] NO    Consultant(s) Notes Reviewed:  [ ] YES  [ ] NO    PHYSICAL EXAM:  GENERAL: Alert and awake lying in bed in no distress  HEAD:  Atraumatic, Normocephalic  EYES: EOMI, PADMINI, conjunctiva and sclera clear  NECK: Supple, No JVD, Normal thyroid  NERVOUS SYSTEM:  Alert & Oriented X3, Motor and sensory systems are intact,   CHEST/LUNG: Bilateral clear breath sounds, no rhochi, no wheezing, no crepitations,  HEART: Regular rate and rhythm; No murmurs, rubs, or gallops  ABDOMEN: Soft, Nontender, Nondistended; Bowel sounds present  EXTREMITIES:   Peripheral Pulses are palpable, no  edema        Care Discussed with Consultants/Other Providers [x ] YES  [ ] NO      Code Status: [] Full Code [] DNR [] DNI [] Goals of Care:   Disposition: [] ICU [] Stroke Unit [] RCU []PCU []Floor [] Discharge Home         DOMITILA Allen.[facility]

## 2022-09-04 NOTE — PROGRESS NOTE ADULT - SUBJECTIVE AND OBJECTIVE BOX
[facility] NEPHROLOGY- PROGRESS NOTE    72 year old Female with history of COPD, CHF presents with weakness. Nephrology consulted for elevated Scr.    Intermittently SOB    REVIEW OF SYSTEMS:  Gen: no fevers,   Cards: no chest pain  Resp: + dyspnea intermittently  GI: no nausea or vomiting or diarrhea  Vascular: + LE edema    No Known Allergies      Hospital Medications: Medications reviewed      VITALS:  T(F): 97.8 (22 @ 08:50), Max: 97.9 (22 @ 22:00)  HR: 89 (22 @ 09:50)  BP: 125/60 (22 @ 08:50)  RR: 18 (22 @ 08:50)  SpO2: 100% (22 @ 09:50)  Wt(kg): --     @ 07:01  -   @ 07:00  --------------------------------------------------------  IN: 0 mL / OUT: 800 mL / NET: -800 mL      PHYSICAL EXAM:  Gen: NAD, calm  Cards: Irregularly irregular, +S1/S2, no M/G/R  Resp: L basilar rales?  GI: soft, NT/ND, NABS  Vascular: mild B LE edema with some improvement        LABS:      136  |  92<L>  |  120<H>  ----------------------------<  120<H>  4.3   |  33<H>  |  1.86<H>    Ca    10.4      04 Sep 2022 05:48  Phos  3.1       Mg     2.30           Creatinine Trend: 1.86 <--, 1.64 <--, 1.54 <--, 1.69 <--, 1.65 <--, 1.65 <--, 1.85 <--                        9.9    12.69 )-----------( 310      ( 04 Sep 2022 05:48 )             35.1     Urine Studies:  Urinalysis Basic - ( 29 Aug 2022 09:05 )    Color: Light Yellow / Appearance: Clear / S.011 / pH:   Gluc:  / Ketone: Negative  / Bili: Negative / Urobili: <2 mg/dL   Blood:  / Protein: Negative / Nitrite: Negative   Leuk Esterase: Negative / RBC: N/A /HPF / WBC N/A /HPF   Sq Epi:  / Non Sq Epi:  / Bacteria:       Creatinine, Random Urine: 44 mg/dL ( @ 09:05)

## 2022-09-04 NOTE — PROGRESS NOTE ADULT - SUBJECTIVE AND OBJECTIVE BOX
PATIENT SEEN AND EXAMINED ON :- 9/4/22  DATE OF SERVICE:  9/4/22           Interim events noted,Labs ,Radiological studies and Cardiology tests reviewed .       HOSPITAL COURSE: HPI:  71 y/o F with pmhx of COPD on home O2(3L) with severe pHTN, OHS/MIGUEL on home biPAP, HTN, HLD who presents with 5 days of lightheadedness and fatigue. Patient states that she has been feeling off for the last few days .Endorse increased fatigue stating she feels "very slow".  She state that at times she feels like the room is spinning.  She denies any fevers, chills, headaches chest pain or increased cough. She has SOB at baseline and uses 3L O2 continuously she denies any increased SOB. She endorse vision changes, stating that "everything seems darker even when there is light outside". She denies any blurry vision or loss of vision. She also endorse polydipsia nad polyuria. Denies polyphagia but does endorse decreased appetite. She states that she sleeps with 3 pillows at night which is normal for her. Also endorse nocturnal dyspnea that has improved since she has been using her bipap at night.   Patient states that she has been using prednisone intermittently for the last month for SOB. She states she was prescribed 10mg BID and states she only takes the medication when she is feeling more dyspnea that her baseline. She states her last dose was 2 days ago. She states that her symptoms progressively worsened to the pint that she felt helpless and decided to call EMS. In EMS her sugars were >500.     In the ED T. BS were measures >600 x2. She was given 5 units of insulin with improvement to 300s. BHB negative. Admitted to medicine for further management.     (24 Aug 2022 08:35)      INTERIM EVENTS:Patient seen at bedside ,interim events noted.      PMH -reviewed admission note, no change since admission  HEART FAILURE: Acute[ ]Chronic[ ] Systolic[ ] Diastolic[ ] Combined Systolic and Diastolic[ ]  CAD[ ] CABG[ ] PCI[ ]  DEVICES[ ] PPM[ ] ICD[ ] ILR[ ]  ATRIAL FIBRILLATION[ ] Paroxysmal[ ] Permanent[ ] CHADS2-[  ]  MISAEL[ ] CKD1[ ] CKD2[ ] CKD3[ ] CKD4[ ] ESRD[ ]  COPD[ ] HTN[ ]   DM[ ] Type1[ ] Type 2[ ]   CVA[ ] Paresis[ ]    AMBULATION: Assisted[ ] Cane/walker[ ] Independent[ ]    MEDICATIONS  (STANDING):  atorvastatin 10 milliGRAM(s) Oral at bedtime  budesonide 160 MICROgram(s)/formoterol 4.5 MICROgram(s) Inhaler 2 Puff(s) Inhalation two times a day  dextrose 5%. 1000 milliLiter(s) (100 mL/Hr) IV Continuous <Continuous>  dextrose 5%. 1000 milliLiter(s) (50 mL/Hr) IV Continuous <Continuous>  dextrose 50% Injectable 25 Gram(s) IV Push once  dextrose 50% Injectable 12.5 Gram(s) IV Push once  dextrose 50% Injectable 25 Gram(s) IV Push once  ferrous    sulfate 325 milliGRAM(s) Oral daily  glucagon  Injectable 1 milliGRAM(s) IntraMuscular once  heparin   Injectable 5000 Unit(s) SubCutaneous every 8 hours  hydrALAZINE 10 milliGRAM(s) Oral three times a day  insulin glargine Injectable (LANTUS) 40 Unit(s) SubCutaneous at bedtime  insulin lispro (ADMELOG) corrective regimen sliding scale   SubCutaneous Before meals and at bedtime  insulin lispro Injectable (ADMELOG) 18 Unit(s) SubCutaneous three times a day before meals  levalbuterol Inhalation 0.63 milliGRAM(s) Inhalation every 6 hours  lidocaine   4% Patch 1 Patch Transdermal daily  meclizine 12.5 milliGRAM(s) Oral three times a day  metoprolol succinate ER 50 milliGRAM(s) Oral daily  pantoprazole    Tablet 40 milliGRAM(s) Oral before breakfast    MEDICATIONS  (PRN):  acetaminophen     Tablet .. 650 milliGRAM(s) Oral every 6 hours PRN Mild Pain (1 - 3), Moderate Pain (4 - 6), Severe Pain (7 - 10)  ALBUTerol    90 MICROgram(s) HFA Inhaler 2 Puff(s) Inhalation every 6 hours PRN Shortness of Breath and/or Wheezing  dextrose Oral Gel 15 Gram(s) Oral once PRN Blood Glucose LESS THAN 70 milliGRAM(s)/deciliter            REVIEW OF SYSTEMS:  Constitutional: [ ] fever, [ ]weight loss,  [ ]fatigue [ ]weight gain  Eyes: [ ] visual changes  Respiratory: [ ]shortness of breath;  [ ] cough, [ ]wheezing, [ ]chills, [ ]hemoptysis  Cardiovascular: [ ] chest pain, [ ]palpitations, [ ]dizziness,  [ ]leg swelling[ ]orthopnea[ ]PND  Gastrointestinal: [ ] abdominal pain, [ ]nausea, [ ]vomiting,  [ ]diarrhea [ ]Constipation [ ]Melena  Genitourinary: [ ] dysuria, [ ] hematuria [ ]Junior  Neurologic: [ ] headaches [ ] tremors[ ]weakness [ ]Paralysis Right[ ] Left[ ]  Skin: [ ] itching, [ ]burning, [ ] rashes  Endocrine: [ ] heat or cold intolerance  Musculoskeletal: [ ] joint pain or swelling; [ ] muscle, back, or extremity pain  Psychiatric: [ ] depression, [ ]anxiety, [ ]mood swings, or [ ]difficulty sleeping  Hematologic: [ ] easy bruising, [ ] bleeding gums    [ ] All remaining systems negative except as per above.   [ ]Unable to obtain.  [x] No change in ROS since admission      Vital Signs Last 24 Hrs  T(C): 36.7 (04 Sep 2022 18:10), Max: 36.7 (04 Sep 2022 16:05)  T(F): 98 (04 Sep 2022 18:10), Max: 98 (04 Sep 2022 16:05)  HR: 84 (04 Sep 2022 20:49) (65 - 92)  BP: 130/75 (04 Sep 2022 18:10) (123/60 - 138/62)  BP(mean): --  RR: 18 (04 Sep 2022 18:10) (17 - 18)  SpO2: 100% (04 Sep 2022 18:10) (94% - 100%)    Parameters below as of 04 Sep 2022 20:49  Patient On (Oxygen Delivery Method): nasal cannula      I&O's Summary    03 Sep 2022 07:01  -  04 Sep 2022 07:00  --------------------------------------------------------  IN: 0 mL / OUT: 800 mL / NET: -800 mL        PHYSICAL EXAM:  General: No acute distress BMI-  HEENT: EOMI, PERRL  Neck: Supple, [ ] JVD  Lungs: Equal air entry bilaterally; [ ] rales [ ] wheezing [ ] rhonchi  Heart: Regular rate and rhythm; [x ] murmur   2/6 [ x] systolic [ ] diastolic [ ] radiation[ ] rubs [ ]  gallops  Abdomen: Nontender, bowel sounds present  Extremities: No clubbing, cyanosis, [ ] edema [ ]Pulses  equal and intact  Nervous system:  Alert & Oriented X3, no focal deficits  Psychiatric: Normal affect  Skin: No rashes or lesions    LABS:  09-04    136  |  92<L>  |  120<H>  ----------------------------<  120<H>  4.3   |  33<H>  |  1.86<H>    Ca    10.4      04 Sep 2022 05:48  Phos  3.1     09-04  Mg     2.30     09-04      Creatinine Trend: 1.86<--, 1.64<--, 1.54<--, 1.69<--, 1.65<--, 1.65<--                        9.9    12.69 )-----------( 310      ( 04 Sep 2022 05:48 )             35.1

## 2022-09-04 NOTE — PROGRESS NOTE ADULT - ASSESSMENT
72 year old Female with history of COPD, CHF presents with weakness. Nephrology consulted for elevated Scr.    1) MISAEL: Secondary to CRS? MISAEL resolved. Renal fxn stable, though serum creatinine increased with restarting diuretics. Plan as below.  UA bland. FeUrea low. Bladder scan not performed. Avoid nephrotoxins.    2) CKD-3b: Baseline Scr 1.4-1.6 with CKD likely due to DM. Outpatient CKD work up. Monitor electrolytes.    3) HTN with CKD: BP controlled. Continue with current medications. Check orthostatics given complaints of dizziness. Monitor BP.    4) LE edema: Worsening again. Changed from bumex 2 mg IV twice daily to 2 mg PO daily, but did not seem to be sufficient. Give Bumex 2mg IV X 1 for last 2 days, but now creatinine increasing. Will hold diuretics and consider restarting po again tomorrow.  Prior TTE with grossly normal LVSF. Monitor UO.    5) L renal mass: Patient refusing inpatient work up. Outpatient Urology evaluation.    6) Hyperkalemia: In setting of hyperglycemia. S/P lokelma. Continue with low K diet. Monitor serum K.      [facility] NEPHROLOGY  Rodrigue Amador M.D.  Rob Perez D.O.  Ana Song M.D.  Lucrecia López, MSN, ANP-C    Telephone: [phone]  Facsimile: [phone]    [address] [place] [address]

## 2022-09-04 NOTE — PROGRESS NOTE ADULT - SUBJECTIVE AND OBJECTIVE BOX
PULMONARY PROGRESS NOTE    DAMARI GUERRERO  MRN-[id number]    Patient is a 72y old  Female who presents with a chief complaint of lightheadedness and fatigue for 5 days (04 Sep 2022 13:37)      HPI:  -  on 3L   cbreathing ok-    ROS:   -    ACTIVE MEDICATION LIST:  MEDICATIONS  (STANDING):  atorvastatin 10 milliGRAM(s) Oral at bedtime  budesonide 160 MICROgram(s)/formoterol 4.5 MICROgram(s) Inhaler 2 Puff(s) Inhalation two times a day  dextrose 5%. 1000 milliLiter(s) (100 mL/Hr) IV Continuous <Continuous>  dextrose 5%. 1000 milliLiter(s) (50 mL/Hr) IV Continuous <Continuous>  dextrose 50% Injectable 25 Gram(s) IV Push once  dextrose 50% Injectable 12.5 Gram(s) IV Push once  dextrose 50% Injectable 25 Gram(s) IV Push once  ferrous    sulfate 325 milliGRAM(s) Oral daily  glucagon  Injectable 1 milliGRAM(s) IntraMuscular once  heparin   Injectable 5000 Unit(s) SubCutaneous every 8 hours  hydrALAZINE 10 milliGRAM(s) Oral three times a day  insulin glargine Injectable (LANTUS) 40 Unit(s) SubCutaneous at bedtime  insulin lispro (ADMELOG) corrective regimen sliding scale   SubCutaneous Before meals and at bedtime  insulin lispro Injectable (ADMELOG) 18 Unit(s) SubCutaneous three times a day before meals  levalbuterol Inhalation 0.63 milliGRAM(s) Inhalation every 6 hours  lidocaine   4% Patch 1 Patch Transdermal daily  meclizine 12.5 milliGRAM(s) Oral three times a day  metoprolol succinate ER 50 milliGRAM(s) Oral daily  pantoprazole    Tablet 40 milliGRAM(s) Oral before breakfast  predniSONE   Tablet 40 milliGRAM(s) Oral daily    MEDICATIONS  (PRN):  acetaminophen     Tablet .. 650 milliGRAM(s) Oral every 6 hours PRN Mild Pain (1 - 3), Moderate Pain (4 - 6), Severe Pain (7 - 10)  ALBUTerol    90 MICROgram(s) HFA Inhaler 2 Puff(s) Inhalation every 6 hours PRN Shortness of Breath and/or Wheezing  dextrose Oral Gel 15 Gram(s) Oral once PRN Blood Glucose LESS THAN 70 milliGRAM(s)/deciliter      EXAM:  Vital Signs Last 24 Hrs  T(C): 36.7 (04 Sep 2022 16:05), Max: 36.7 (04 Sep 2022 16:05)  T(F): 98 (04 Sep 2022 16:05), Max: 98 (04 Sep 2022 16:05)  HR: 82 (04 Sep 2022 16:18) (65 - 92)  BP: 135/71 (04 Sep 2022 16:05) (123/60 - 146/74)  BP(mean): --  RR: 18 (04 Sep 2022 16:05) (17 - 18)  SpO2: 99% (04 Sep 2022 16:18) (94% - 100%)    Parameters below as of 04 Sep 2022 16:05  Patient On (Oxygen Delivery Method): nasal cannula  O2 Flow (L/min): 3      GENERAL: The patient is awake and alert in no apparent distress.     LUNGS: Clear to auscultation without wheezing, rales or rhonchi; respirations unlabored                9.9    12.69 )-----------( 310      ( 04 Sep 2022 05:48 )             35.1       09-04    136  |  92<L>  |  120<H>  ----------------------------<  120<H>  4.3   |  33<H>  |  1.86<H>    Ca    10.4      04 Sep 2022 05:48  Phos  3.1     09-04  Mg     2.30     09-04     < from: Xray Chest 1 View- PORTABLE-Urgent (Xray Chest 1 View- PORTABLE-Urgent .) (08.29.22 @ 09:49) >  [id number] EXAM:  XR CHEST PORTABLE URGENT 1V                        ACC: [id number] EXAM:  XR CHEST PORTABLE URGENT 1V                          PROCEDURE DATE:  08/29/2022          INTERPRETATION:  CLINICAL INFORMATION: Dyspnea    TIME OF EXAMINATION: August 27, 2022 at 1:24 PM and August 29 at 8:45 AM    EXAM: Portable chest    FINDINGS:  August 27 at 1:24 PM:  There are no focal consolidations. Body habitus limits evaluation. There   is a perihilar haze may be overlying soft tissues versus mild edema.   Heart appears enlarged but stable. Sternotomy wires are present.    August 29 at 8:45 AM:  No interval change. Perihilar haze with mild interstitial prominence   consistent with CHF. No focal consolidations to suggest pneumonia. No   pneumothorax.    COMPARISON: August 23      IMPRESSION:  *  No focal consolidations.  *  Mild pulmonary edema.    --- End of Report ---            DELIA PEDERSEN MD; Attending Radiologist  This document has been electronically signed. Aug 29 2022 11:04AM    < end of copied text >      PROBLEM LIST:  72y Female with HEALTH ISSUES - PROBLEM Dx:  Hyperglycemia due to diabetes mellitus    Chronic diastolic heart failure    MIGUEL treated with BiPAP    COPD, severe    Chronic atrial fibrillation    Need for prophylactic measure    HLD (hyperlipidemia)    Diabetes mellitus, new onset    Hypertension    Acute kidney injury superimposed on CKD    Steroid-induced hyperglycemia           RECS:  discussed with primary team- the current settings are for bpap  the patient does not know if she has avaps or bpap at home  she is not amenable to an abg in the morning  uggest bpap settings be changed to be more in line with most recent hospitalization which was dec-jan 2022; 15/10 bpap, back up rate of 12  i have emailed the [employer] to see if we can get info on avaps machine  would complete prednisone taper  continue 02  continue NIVV at night  she confirms she has a machine at home, also emphasized the need to see a pulm doc outpatient ( she denies f/u with pulm outpatient   dvt prophylaxis        Please call with any questions.    Irma Eaton, DO  [facility]  [phone]

## 2022-09-05 LAB
ANION GAP SERPL CALC-SCNC: 13 MMOL/L — SIGNIFICANT CHANGE UP (ref 7–14)
BUN SERPL-MCNC: 123 MG/DL — HIGH (ref 7–23)
CALCIUM SERPL-MCNC: 10.5 MG/DL — SIGNIFICANT CHANGE UP (ref 8.4–10.5)
CHLORIDE SERPL-SCNC: 95 MMOL/L — LOW (ref 98–107)
CO2 SERPL-SCNC: 31 MMOL/L — SIGNIFICANT CHANGE UP (ref 22–31)
CREAT SERPL-MCNC: 1.66 MG/DL — HIGH (ref 0.5–1.3)
EGFR: 32 ML/MIN/1.73M2 — LOW
GLUCOSE BLDC GLUCOMTR-MCNC: 213 MG/DL — HIGH (ref 70–99)
GLUCOSE BLDC GLUCOMTR-MCNC: 280 MG/DL — HIGH (ref 70–99)
GLUCOSE BLDC GLUCOMTR-MCNC: 301 MG/DL — HIGH (ref 70–99)
GLUCOSE BLDC GLUCOMTR-MCNC: 87 MG/DL — SIGNIFICANT CHANGE UP (ref 70–99)
GLUCOSE SERPL-MCNC: 63 MG/DL — LOW (ref 70–99)
HCT VFR BLD CALC: 34.9 % — SIGNIFICANT CHANGE UP (ref 34.5–45)
HGB BLD-MCNC: 10.1 G/DL — LOW (ref 11.5–15.5)
MAGNESIUM SERPL-MCNC: 2.3 MG/DL — SIGNIFICANT CHANGE UP (ref 1.6–2.6)
MCHC RBC-ENTMCNC: 28.9 GM/DL — LOW (ref 32–36)
MCHC RBC-ENTMCNC: 29.7 PG — SIGNIFICANT CHANGE UP (ref 27–34)
MCV RBC AUTO: 102.6 FL — HIGH (ref 80–100)
NRBC # BLD: 0 /100 WBCS — SIGNIFICANT CHANGE UP (ref 0–0)
NRBC # FLD: 0.02 K/UL — HIGH (ref 0–0)
PHOSPHATE SERPL-MCNC: 2.7 MG/DL — SIGNIFICANT CHANGE UP (ref 2.5–4.5)
PLATELET # BLD AUTO: 262 K/UL — SIGNIFICANT CHANGE UP (ref 150–400)
POTASSIUM SERPL-MCNC: 4.5 MMOL/L — SIGNIFICANT CHANGE UP (ref 3.5–5.3)
POTASSIUM SERPL-SCNC: 4.5 MMOL/L — SIGNIFICANT CHANGE UP (ref 3.5–5.3)
RBC # BLD: 3.4 M/UL — LOW (ref 3.8–5.2)
RBC # FLD: 14.3 % — SIGNIFICANT CHANGE UP (ref 10.3–14.5)
SARS-COV-2 RNA SPEC QL NAA+PROBE: SIGNIFICANT CHANGE UP
SODIUM SERPL-SCNC: 139 MMOL/L — SIGNIFICANT CHANGE UP (ref 135–145)
WBC # BLD: 11.43 K/UL — HIGH (ref 3.8–10.5)
WBC # FLD AUTO: 11.43 K/UL — HIGH (ref 3.8–10.5)

## 2022-09-05 PROCEDURE — 99232 SBSQ HOSP IP/OBS MODERATE 35: CPT

## 2022-09-05 RX ORDER — INSULIN LISPRO 100/ML
VIAL (ML) SUBCUTANEOUS AT BEDTIME
Refills: 0 | Status: DISCONTINUED | OUTPATIENT
Start: 2022-09-05 | End: 2022-09-07

## 2022-09-05 RX ORDER — ENOXAPARIN SODIUM 100 MG/ML
40 INJECTION SUBCUTANEOUS
Qty: 1 | Refills: 0
Start: 2022-09-05

## 2022-09-05 RX ORDER — ATORVASTATIN CALCIUM 80 MG/1
20 TABLET, FILM COATED ORAL AT BEDTIME
Refills: 0 | Status: DISCONTINUED | OUTPATIENT
Start: 2022-09-05 | End: 2022-09-07

## 2022-09-05 RX ORDER — INSULIN LISPRO 100/ML
18 VIAL (ML) SUBCUTANEOUS
Qty: 1 | Refills: 0
Start: 2022-09-05

## 2022-09-05 RX ORDER — INSULIN GLARGINE 100 [IU]/ML
38 INJECTION, SOLUTION SUBCUTANEOUS AT BEDTIME
Refills: 0 | Status: DISCONTINUED | OUTPATIENT
Start: 2022-09-05 | End: 2022-09-06

## 2022-09-05 RX ORDER — INSULIN LISPRO 100/ML
VIAL (ML) SUBCUTANEOUS
Refills: 0 | Status: DISCONTINUED | OUTPATIENT
Start: 2022-09-05 | End: 2022-09-07

## 2022-09-05 RX ORDER — BUMETANIDE 0.25 MG/ML
2 INJECTION INTRAMUSCULAR; INTRAVENOUS DAILY
Refills: 0 | Status: DISCONTINUED | OUTPATIENT
Start: 2022-09-06 | End: 2022-09-07

## 2022-09-05 RX ADMIN — Medication 30 MILLIGRAM(S): at 06:15

## 2022-09-05 RX ADMIN — ATORVASTATIN CALCIUM 20 MILLIGRAM(S): 80 TABLET, FILM COATED ORAL at 21:20

## 2022-09-05 RX ADMIN — HEPARIN SODIUM 5000 UNIT(S): 5000 INJECTION INTRAVENOUS; SUBCUTANEOUS at 06:15

## 2022-09-05 RX ADMIN — INSULIN GLARGINE 38 UNIT(S): 100 INJECTION, SOLUTION SUBCUTANEOUS at 21:22

## 2022-09-05 RX ADMIN — Medication 10 MILLIGRAM(S): at 21:18

## 2022-09-05 RX ADMIN — PANTOPRAZOLE SODIUM 40 MILLIGRAM(S): 20 TABLET, DELAYED RELEASE ORAL at 06:26

## 2022-09-05 RX ADMIN — Medication 8: at 12:03

## 2022-09-05 RX ADMIN — LEVALBUTEROL 0.63 MILLIGRAM(S): 1.25 SOLUTION, CONCENTRATE RESPIRATORY (INHALATION) at 15:07

## 2022-09-05 RX ADMIN — Medication 10 MILLIGRAM(S): at 06:19

## 2022-09-05 RX ADMIN — Medication 6: at 17:07

## 2022-09-05 RX ADMIN — Medication 325 MILLIGRAM(S): at 12:05

## 2022-09-05 RX ADMIN — Medication 18 UNIT(S): at 12:04

## 2022-09-05 RX ADMIN — LEVALBUTEROL 0.63 MILLIGRAM(S): 1.25 SOLUTION, CONCENTRATE RESPIRATORY (INHALATION) at 09:02

## 2022-09-05 RX ADMIN — BUDESONIDE AND FORMOTEROL FUMARATE DIHYDRATE 2 PUFF(S): 160; 4.5 AEROSOL RESPIRATORY (INHALATION) at 08:38

## 2022-09-05 RX ADMIN — Medication 50 MILLIGRAM(S): at 06:26

## 2022-09-05 RX ADMIN — LEVALBUTEROL 0.63 MILLIGRAM(S): 1.25 SOLUTION, CONCENTRATE RESPIRATORY (INHALATION) at 04:05

## 2022-09-05 RX ADMIN — LEVALBUTEROL 0.63 MILLIGRAM(S): 1.25 SOLUTION, CONCENTRATE RESPIRATORY (INHALATION) at 21:21

## 2022-09-05 RX ADMIN — LIDOCAINE 1 PATCH: 4 CREAM TOPICAL at 12:06

## 2022-09-05 RX ADMIN — HEPARIN SODIUM 5000 UNIT(S): 5000 INJECTION INTRAVENOUS; SUBCUTANEOUS at 13:36

## 2022-09-05 RX ADMIN — Medication 10 MILLIGRAM(S): at 13:36

## 2022-09-05 RX ADMIN — Medication 18 UNIT(S): at 17:08

## 2022-09-05 NOTE — PROGRESS NOTE ADULT - SUBJECTIVE AND OBJECTIVE BOX
DAMARI GUERRERO  72y  Female      Patient is a 72y old  Female who presents with a chief complaint of lightheadedness and fatigue for 5 days (05 Sep 2022 11:55)  comfortable,nad.no sob,no cp,no fever,no cough    REVIEW OF SYSTEMS:  CONSTITUTIONAL: No fever  RESPIRATORY: No cough, hemoptysis or shortness of breath  CARDIOVASCULAR: No chest pain, palpitations, dizziness, or leg swelling  GASTROINTESTINAL: No abdominal pain. nausea, vomiting, hematemesis  INTERVAL HPI/OVERNIGHT EVENTS:  T(C): 36.7 (09-05-22 @ 17:35), Max: 36.7 (09-05-22 @ 17:35)  HR: 63 (09-05-22 @ 17:35) (63 - 84)  BP: 160/79 (09-05-22 @ 17:35) (117/73 - 160/79)  RR: 18 (09-05-22 @ 17:35) (18 - 19)  SpO2: 100% (09-05-22 @ 17:35) (93% - 100%)  Wt(kg): --  I&O's Summary    T(C): 36.7 (09-05-22 @ 17:35), Max: 36.7 (09-05-22 @ 17:35)  HR: 63 (09-05-22 @ 17:35) (63 - 84)  BP: 160/79 (09-05-22 @ 17:35) (117/73 - 160/79)  RR: 18 (09-05-22 @ 17:35) (18 - 19)  SpO2: 100% (09-05-22 @ 17:35) (93% - 100%)  Wt(kg): --Vital Signs Last 24 Hrs  T(C): 36.7 (05 Sep 2022 17:35), Max: 36.7 (05 Sep 2022 17:35)  T(F): 98 (05 Sep 2022 17:35), Max: 98 (05 Sep 2022 17:35)  HR: 63 (05 Sep 2022 17:35) (63 - 84)  BP: 160/79 (05 Sep 2022 17:35) (117/73 - 160/79)  BP(mean): --  RR: 18 (05 Sep 2022 17:35) (18 - 19)  SpO2: 100% (05 Sep 2022 17:35) (93% - 100%)    Parameters below as of 05 Sep 2022 17:35  Patient On (Oxygen Delivery Method): room air        LABS:                        10.1   11.43 )-----------( 262      ( 05 Sep 2022 06:11 )             34.9     09-05    139  |  95<L>  |  123<H>  ----------------------------<  63<L>  4.5   |  31  |  1.66<H>    Ca    10.5      05 Sep 2022 06:11  Phos  2.7     09-05  Mg     2.30     09-05          CAPILLARY BLOOD GLUCOSE  301 (05 Sep 2022 11:57)      POCT Blood Glucose.: 280 mg/dL (05 Sep 2022 16:24)  POCT Blood Glucose.: 301 mg/dL (05 Sep 2022 11:48)  POCT Blood Glucose.: 87 mg/dL (05 Sep 2022 07:27)  POCT Blood Glucose.: 317 mg/dL (04 Sep 2022 22:24)            PAST MEDICAL & SURGICAL HISTORY:  Atrial fibrillation  S/P Ablation      Pulmonary hypertension      MIGUEL (obstructive sleep apnea)      COPD (chronic obstructive pulmonary disease)  on home O2      CHF (congestive heart failure)      HTN (hypertension)      Gout      Mitral valve replaced      Tricuspid valve replaced      CHF (congestive heart failure)      Hypertension      COPD (chronic obstructive pulmonary disease)      History of mitral valve replacement with mechanical valve      Tricuspid valve replaced  mechanical          MEDICATIONS  (STANDING):  atorvastatin 20 milliGRAM(s) Oral at bedtime  budesonide 160 MICROgram(s)/formoterol 4.5 MICROgram(s) Inhaler 2 Puff(s) Inhalation two times a day  dextrose 5%. 1000 milliLiter(s) (100 mL/Hr) IV Continuous <Continuous>  dextrose 5%. 1000 milliLiter(s) (50 mL/Hr) IV Continuous <Continuous>  dextrose 50% Injectable 25 Gram(s) IV Push once  dextrose 50% Injectable 12.5 Gram(s) IV Push once  dextrose 50% Injectable 25 Gram(s) IV Push once  ferrous    sulfate 325 milliGRAM(s) Oral daily  glucagon  Injectable 1 milliGRAM(s) IntraMuscular once  heparin   Injectable 5000 Unit(s) SubCutaneous every 8 hours  hydrALAZINE 10 milliGRAM(s) Oral three times a day  insulin glargine Injectable (LANTUS) 38 Unit(s) SubCutaneous at bedtime  insulin lispro (ADMELOG) corrective regimen sliding scale   SubCutaneous three times a day before meals  insulin lispro (ADMELOG) corrective regimen sliding scale   SubCutaneous at bedtime  insulin lispro Injectable (ADMELOG) 18 Unit(s) SubCutaneous three times a day before meals  levalbuterol Inhalation 0.63 milliGRAM(s) Inhalation every 6 hours  lidocaine   4% Patch 1 Patch Transdermal daily  meclizine 12.5 milliGRAM(s) Oral three times a day  metoprolol succinate ER 50 milliGRAM(s) Oral daily  pantoprazole    Tablet 40 milliGRAM(s) Oral before breakfast    MEDICATIONS  (PRN):  acetaminophen     Tablet .. 650 milliGRAM(s) Oral every 6 hours PRN Mild Pain (1 - 3), Moderate Pain (4 - 6), Severe Pain (7 - 10)  ALBUTerol    90 MICROgram(s) HFA Inhaler 2 Puff(s) Inhalation every 6 hours PRN Shortness of Breath and/or Wheezing  dextrose Oral Gel 15 Gram(s) Oral once PRN Blood Glucose LESS THAN 70 milliGRAM(s)/deciliter        RADIOLOGY & ADDITIONAL TESTS:    Imaging Personally Reviewed:  [ ] YES  [ ] NO    Consultant(s) Notes Reviewed:  [ ] YES  [ ] NO    PHYSICAL EXAM:  GENERAL: Alert and awake lying in bed in no distress,obese  HEAD:  Atraumatic, Normocephalic  EYES: EOMI, PADMINI, conjunctiva and sclera clear  NECK: Supple, No JVD, Normal thyroid  NERVOUS SYSTEM:  Alert & Oriented X3, Motor and sensory systems are intact,   CHEST/LUNG: Bilateral clear breath sounds, no rhochi, no wheezing, no crepitations,  HEART: Regular rate and rhythm; No murmurs, rubs, or gallops  ABDOMEN: Soft, Nontender, Nondistended; Bowel sounds present  EXTREMITIES:   Peripheral Pulses are palpable, no  edema        Care Discussed with Consultants/Other Providers [x ] YES  [ ] NO      Code Status: [] Full Code [] DNR [] DNI [] Goals of Care:   Disposition: [] ICU [] Stroke Unit [] RCU []PCU []Floor [] Discharge Home         SHARON AllenFACP

## 2022-09-05 NOTE — PROGRESS NOTE ADULT - SUBJECTIVE AND OBJECTIVE BOX
PATIENT SEEN AND EXAMINED ON :- 9/5/22  DATE OF SERVICE:     9/5/22        Interim events noted,Labs ,Radiological studies and Cardiology tests reviewed .       HOSPITAL COURSE: HPI:  71 y/o F with pmhx of COPD on home O2(3L) with severe pHTN, OHS/MIGUEL on home biPAP, HTN, HLD who presents with 5 days of lightheadedness and fatigue. Patient states that she has been feeling off for the last few days .Endorse increased fatigue stating she feels "very slow".  She state that at times she feels like the room is spinning.  She denies any fevers, chills, headaches chest pain or increased cough. She has SOB at baseline and uses 3L O2 continuously she denies any increased SOB. She endorse vision changes, stating that "everything seems darker even when there is light outside". She denies any blurry vision or loss of vision. She also endorse polydipsia nad polyuria. Denies polyphagia but does endorse decreased appetite. She states that she sleeps with 3 pillows at night which is normal for her. Also endorse nocturnal dyspnea that has improved since she has been using her bipap at night.   Patient states that she has been using prednisone intermittently for the last month for SOB. She states she was prescribed 10mg BID and states she only takes the medication when she is feeling more dyspnea that her baseline. She states her last dose was 2 days ago. She states that her symptoms progressively worsened to the pint that she felt helpless and decided to call EMS. In EMS her sugars were >500.     In the ED T. BS were measures >600 x2. She was given 5 units of insulin with improvement to 300s. BHB negative. Admitted to medicine for further management.     (24 Aug 2022 08:35)      INTERIM EVENTS:Patient seen at bedside ,interim events noted.      PMH -reviewed admission note, no change since admission  HEART FAILURE: Acute[ ]Chronic[ ] Systolic[ ] Diastolic[ ] Combined Systolic and Diastolic[ ]  CAD[ ] CABG[ ] PCI[ ]  DEVICES[ ] PPM[ ] ICD[ ] ILR[ ]  ATRIAL FIBRILLATION[ ] Paroxysmal[ ] Permanent[ ] CHADS2-[  ]  MISAEL[ ] CKD1[ ] CKD2[ ] CKD3[ ] CKD4[ ] ESRD[ ]  COPD[ ] HTN[ ]   DM[ ] Type1[ ] Type 2[ ]   CVA[ ] Paresis[ ]    AMBULATION: Assisted[ ] Cane/walker[ ] Independent[ ]    MEDICATIONS  (STANDING):  atorvastatin 20 milliGRAM(s) Oral at bedtime  budesonide 160 MICROgram(s)/formoterol 4.5 MICROgram(s) Inhaler 2 Puff(s) Inhalation two times a day  dextrose 5%. 1000 milliLiter(s) (100 mL/Hr) IV Continuous <Continuous>  dextrose 5%. 1000 milliLiter(s) (50 mL/Hr) IV Continuous <Continuous>  dextrose 50% Injectable 25 Gram(s) IV Push once  dextrose 50% Injectable 12.5 Gram(s) IV Push once  dextrose 50% Injectable 25 Gram(s) IV Push once  ferrous    sulfate 325 milliGRAM(s) Oral daily  glucagon  Injectable 1 milliGRAM(s) IntraMuscular once  heparin   Injectable 5000 Unit(s) SubCutaneous every 8 hours  hydrALAZINE 10 milliGRAM(s) Oral three times a day  insulin glargine Injectable (LANTUS) 38 Unit(s) SubCutaneous at bedtime  insulin lispro (ADMELOG) corrective regimen sliding scale   SubCutaneous three times a day before meals  insulin lispro (ADMELOG) corrective regimen sliding scale   SubCutaneous at bedtime  insulin lispro Injectable (ADMELOG) 18 Unit(s) SubCutaneous three times a day before meals  levalbuterol Inhalation 0.63 milliGRAM(s) Inhalation every 6 hours  lidocaine   4% Patch 1 Patch Transdermal daily  meclizine 12.5 milliGRAM(s) Oral three times a day  metoprolol succinate ER 50 milliGRAM(s) Oral daily  pantoprazole    Tablet 40 milliGRAM(s) Oral before breakfast    MEDICATIONS  (PRN):  acetaminophen     Tablet .. 650 milliGRAM(s) Oral every 6 hours PRN Mild Pain (1 - 3), Moderate Pain (4 - 6), Severe Pain (7 - 10)  ALBUTerol    90 MICROgram(s) HFA Inhaler 2 Puff(s) Inhalation every 6 hours PRN Shortness of Breath and/or Wheezing  dextrose Oral Gel 15 Gram(s) Oral once PRN Blood Glucose LESS THAN 70 milliGRAM(s)/deciliter            REVIEW OF SYSTEMS:  Constitutional: [ ] fever, [ ]weight loss,  [ ]fatigue [ ]weight gain  Eyes: [ ] visual changes  Respiratory: [ ]shortness of breath;  [ ] cough, [ ]wheezing, [ ]chills, [ ]hemoptysis  Cardiovascular: [ ] chest pain, [ ]palpitations, [ ]dizziness,  [ ]leg swelling[ ]orthopnea[ ]PND  Gastrointestinal: [ ] abdominal pain, [ ]nausea, [ ]vomiting,  [ ]diarrhea [ ]Constipation [ ]Melena  Genitourinary: [ ] dysuria, [ ] hematuria [ ]Junior  Neurologic: [ ] headaches [ ] tremors[ ]weakness [ ]Paralysis Right[ ] Left[ ]  Skin: [ ] itching, [ ]burning, [ ] rashes  Endocrine: [ ] heat or cold intolerance  Musculoskeletal: [ ] joint pain or swelling; [ ] muscle, back, or extremity pain  Psychiatric: [ ] depression, [ ]anxiety, [ ]mood swings, or [ ]difficulty sleeping  Hematologic: [ ] easy bruising, [ ] bleeding gums    [ ] All remaining systems negative except as per above.   [ ]Unable to obtain.  [x] No change in ROS since admission      Vital Signs Last 24 Hrs  T(C): 36.7 (05 Sep 2022 17:35), Max: 36.7 (05 Sep 2022 17:35)  T(F): 98 (05 Sep 2022 17:35), Max: 98 (05 Sep 2022 17:35)  HR: 68 (05 Sep 2022 21:25) (63 - 84)  BP: 160/79 (05 Sep 2022 17:35) (117/73 - 160/79)  BP(mean): --  RR: 18 (05 Sep 2022 17:35) (18 - 19)  SpO2: 100% (05 Sep 2022 21:25) (93% - 100%)    Parameters below as of 05 Sep 2022 21:25  Patient On (Oxygen Delivery Method): nasal cannula      I&O's Summary      PHYSICAL EXAM:  General: No acute distress BMI-  HEENT: EOMI, PERRL  Neck: Supple, [ ] JVD  Lungs: Equal air entry bilaterally; [ ] rales [ ] wheezing [ ] rhonchi  Heart: Regular rate and rhythm; [x ] murmur   2/6 [ x] systolic [ ] diastolic [ ] radiation[ ] rubs [ ]  gallops  Abdomen: Nontender, bowel sounds present  Extremities: No clubbing, cyanosis, [ ] edema [ ]Pulses  equal and intact  Nervous system:  Alert & Oriented X3, no focal deficits  Psychiatric: Normal affect  Skin: No rashes or lesions    LABS:  09-05    139  |  95<L>  |  123<H>  ----------------------------<  63<L>  4.5   |  31  |  1.66<H>    Ca    10.5      05 Sep 2022 06:11  Phos  2.7     09-05  Mg     2.30     09-05      Creatinine Trend: 1.66<--, 1.86<--, 1.64<--, 1.54<--, 1.69<--, 1.65<--                        10.1   11.43 )-----------( 262      ( 05 Sep 2022 06:11 )             34.9

## 2022-09-05 NOTE — PROGRESS NOTE ADULT - SUBJECTIVE AND OBJECTIVE BOX
[facility] NEPHROLOGY- PROGRESS NOTE    72 year old Female with history of COPD, CHF presents with weakness. Nephrology consulted for elevated Scr.    Intermittently SOB    REVIEW OF SYSTEMS:  Gen: no fevers,   Cards: no chest pain  Resp: + dyspnea intermittently  GI: no nausea or vomiting or diarrhea  Vascular: + LE edema    No Known Allergies      Hospital Medications: Medications reviewed      VITALS:  T(F): 97.5 (09-05-22 @ 06:19), Max: 98 (09-04-22 @ 16:05)  HR: 72 (09-05-22 @ 09:06)  BP: 117/73 (09-05-22 @ 06:19)  RR: 19 (09-05-22 @ 06:19)  SpO2: 98% (09-05-22 @ 09:06)      PHYSICAL EXAM:  Gen: NAD, calm  Cards: Irregularly irregular, +S1/S2, no M/G/R  Resp: L basilar rales?  GI: soft, NT/ND, NABS  Vascular: mild B LE edema with some improvement      LABS:  09-05    139  |  95<L>  |  123<H>  ----------------------------<  63<L>  4.5   |  31  |  1.66<H>    Ca    10.5      05 Sep 2022 06:11  Phos  2.7     09-05  Mg     2.30     09-05      Creatinine Trend: 1.66 <--, 1.86 <--, 1.64 <--, 1.54 <--, 1.69 <--, 1.65 <--, 1.65 <--                        10.1   11.43 )-----------( 262      ( 05 Sep 2022 06:11 )             34.9

## 2022-09-05 NOTE — CHART NOTE - NSCHARTNOTEFT_GEN_A_CORE
Spoke to DME yesterday- patient has an avaps machine at home. can continue bpap for her now, she can go back to avaps when she gets home.

## 2022-09-05 NOTE — PROGRESS NOTE ADULT - ASSESSMENT
72 year old Female with history of COPD, CHF presents with weakness. Nephrology consulted for elevated Scr.    1) MISAEL: Secondary to CRS? MISAEL resolved. Renal fxn stable, though serum creatinine increased with restarting diuretics. Plan as below.  UA bland. FeUrea low. Bladder scan not performed. Avoid nephrotoxins.    2) CKD-3b: Baseline Scr 1.4-1.6 with CKD likely due to DM. Outpatient CKD work up. Monitor electrolytes.    3) HTN with CKD: BP controlled. Continue with current medications. Check orthostatics given complaints of dizziness. Monitor BP.    4) LE edema: Worsening again. Changed from bumex 2 mg IV twice daily to 2 mg PO daily, but did not seem to be sufficient. Given Bumex 2mg IV X 1 for last 2 days, but then creatinine increased. Hold diuretics today and restart po again tomorrow.  Prior TTE with grossly normal LVSF. Monitor UO.    5) L renal mass: Patient refusing inpatient work up. Outpatient Urology evaluation.    6) Hyperkalemia: In setting of hyperglycemia. S/P lokelma. Continue with low K diet. Monitor serum K.      [facility] NEPHROLOGY  Rodrigue Amador M.D.  Rob Perez D.O.  Ana Song M.D.  Lucrecia López, MSN, ANP-C    Telephone: [phone]  Facsimile: [phone]    [address] [place] [address]

## 2022-09-05 NOTE — PROGRESS NOTE ADULT - SUBJECTIVE AND OBJECTIVE BOX
Chief Complaint: DM 2 with hyperglycemia exacerbated by steroids     History: Patient seen at bedside. Reports she is eating meals, hypoglycemia on serum labs this AM, patient received 8 units of correctional Admelog last night at bedtime. Also of note: prednisone dose of 30 mg today but decreasing to 20 mg tomorrow 9/6.     MEDICATIONS  (STANDING):  atorvastatin 20 milliGRAM(s) Oral at bedtime  budesonide 160 MICROgram(s)/formoterol 4.5 MICROgram(s) Inhaler 2 Puff(s) Inhalation two times a day  dextrose 5%. 1000 milliLiter(s) (100 mL/Hr) IV Continuous <Continuous>  dextrose 5%. 1000 milliLiter(s) (50 mL/Hr) IV Continuous <Continuous>  dextrose 50% Injectable 25 Gram(s) IV Push once  dextrose 50% Injectable 12.5 Gram(s) IV Push once  dextrose 50% Injectable 25 Gram(s) IV Push once  ferrous    sulfate 325 milliGRAM(s) Oral daily  glucagon  Injectable 1 milliGRAM(s) IntraMuscular once  heparin   Injectable 5000 Unit(s) SubCutaneous every 8 hours  hydrALAZINE 10 milliGRAM(s) Oral three times a day  insulin glargine Injectable (LANTUS) 40 Unit(s) SubCutaneous at bedtime  insulin lispro (ADMELOG) corrective regimen sliding scale   SubCutaneous three times a day before meals  insulin lispro (ADMELOG) corrective regimen sliding scale   SubCutaneous at bedtime  insulin lispro Injectable (ADMELOG) 18 Unit(s) SubCutaneous three times a day before meals  levalbuterol Inhalation 0.63 milliGRAM(s) Inhalation every 6 hours  lidocaine   4% Patch 1 Patch Transdermal daily  meclizine 12.5 milliGRAM(s) Oral three times a day  metoprolol succinate ER 50 milliGRAM(s) Oral daily  pantoprazole    Tablet 40 milliGRAM(s) Oral before breakfast    No Known Allergies    Review of Systems:  Cardiovascular: No chest pain  Respiratory: +SOB  GI: No nausea, vomiting  Endocrine: no hypoglycemia     PHYSICAL EXAM:  Vital Signs Last 24 Hrs  T(C): 36.4 (05 Sep 2022 06:19), Max: 36.7 (04 Sep 2022 16:05)  T(F): 97.5 (05 Sep 2022 06:19), Max: 98 (04 Sep 2022 16:05)  HR: 72 (05 Sep 2022 09:06) (65 - 86)  BP: 117/73 (05 Sep 2022 06:19) (117/73 - 135/71)  BP(mean): --  RR: 19 (05 Sep 2022 06:19) (18 - 19)  SpO2: 98% (05 Sep 2022 09:06) (93% - 100%)    Parameters below as of 05 Sep 2022 09:06  Patient On (Oxygen Delivery Method): nasal cannula        GENERAL: NAD  EYES: No proptosis, no lid lag, anicteric  HEENT:  Atraumatic, Normocephalic, moist mucous membranes  RESPIRATORY: on nasal cannula, unlabored respirations     CAPILLARY BLOOD GLUCOSE      POCT Blood Glucose.: 87 mg/dL (05 Sep 2022 07:27)  POCT Blood Glucose.: 317 mg/dL (04 Sep 2022 22:24)  POCT Blood Glucose.: 329 mg/dL (04 Sep 2022 16:39)      09-05    139  |  95<L>  |  123<H>  ----------------------------<  63<L>  4.5   |  31  |  1.66<H>    Ca    10.5      05 Sep 2022 06:11  Phos  2.7     09-05  Mg     2.30     09-05      A1C with Estimated Average Glucose Result: 10.7 % (08-23-22 @ 23:18)    Diet, Consistent Carbohydrate/No Snacks:   No Concentrated Potassium  Low Sodium  Supplement Feeding Modality:  Oral  Nepro Cans or Servings Per Day:  1       Frequency:  Three Times a day (08-31-22 @ 15:41) [Active]

## 2022-09-05 NOTE — PROGRESS NOTE ADULT - ASSESSMENT
71 y/o F w/ COPD on home O2(3L) with severe pHTN, OHS/MIGUEL on home biPAP, HTN, HLD admitted for hyperglycemia >600 with c/f HHS.  Endocrine consulted for management of hyperglycemia/new T2DM.  Patient does not meet full criteria for HHS (serum glc >600, plasma osmolality >320, AMS), and is responding adequately to insulin but remains with hyperglycemia, exacerbated by steroids     1. DM 2 with hyperglycemia  Admitted with HHS  Now with steroid induced hyperglycemia - on Prednisone taper     While inpatient:  BG target 100-180 mg/dl   Hypoglycemia this AM, may have been contributed by correctional Admelog dose at bedtime   Will decrease Lantus to 38 units SQ for tonight   Continue Admelog to 18 units sq tid ac- hold for NPO  Changed correctional scales to be Admelog MODERATE dosing before meals and standard LOW scale at bedtime   Consistent carbohydrate diet, Nutrition consult (completed)  Check BG before meals and bedtime  Hypoglycemia protocol   Expect insulin requirements to decrease as steroid doses are tapered. Prednisone dose to decrease tomorrow from 30 mg t0 20 mg.   Provider to RN: Insulin pen administration and glucometer teaching prior to discharge. Please send glucometer to [facility] and teach pt with her own glucometer prior to discharge.    Discharge Plan:  Levemir and Novolog PENS with dosing TBD   Discuss glycemic goal of a1c <7% to prevent microvascular complications of diabetes mellitus and reduce the risk of macrovascular complications.  Make sure patient knows how to inject insulin and check fingersticks with glucometer (ask bedside RN for teaching)  Patient's  takes Levemir and Novolog at home and patient reports he will assist her  Ensure patient has working glucometer, test strips, lancets, alcohol pads, and BD sharan pen needles  Please also prescribe glucose tabs, Baqsimi nasal spray or glucagon emergency kit for hypoglycemia risk   Pt should call PMD for DM related questions or concerns until pt is seen by CDE or endocrine   Recommend PCP, endocrinology, podiatry and ophthalmology follow up  Followup scheduled with [facility]: [facility], [address] [place] [address], [address], [place] [address], [phone]   1. With Nurse Practitioner: Linda 9/9/22 at 1:30 PM: May need to reschedule this appt   2. Endocrinologist: Dr. Henderson 11/14/22 12:20 PM    2. Steroid use  Patient intermittently using prednisone 10mg for SOB, last dose 2 days ago.  Prednisone taper - dose to decrease tomorrow, see above   Steroid induced hyperglycemia - see insulin adjustments as above  Notify endocrine with any updates to steroid plan     3. HTN  BP less than 130/80  titration per primary team    4. HLD  Was on Atorvastatin 10 mg daily --> agree with increase to 20 mg  LDL is not at goal of less than 70 (currently 110)  Follow up lipid panel as outpatient     EDDY Ordaz-BC  Nurse Practitioner   Division of Endocrinology  Pager: NATALIA pager [phone]    If out of hospital/unavailable when paged, please note: patient will be cared for by another provider on the endocrine service.  Please call the endocrine answering service for assistance to reach covering provider ([phone]). For non-urgent matters, please email [email or web address] for assistance.      73 y/o F w/ COPD on home O2(3L) with severe pHTN, OHS/MIGUEL on home biPAP, HTN, HLD admitted for hyperglycemia >600 with c/f HHS.  Endocrine consulted for management of hyperglycemia/new T2DM.  Patient does not meet full criteria for HHS (serum glc >600, plasma osmolality >320, AMS), and is responding adequately to insulin but remains with hyperglycemia, exacerbated by steroids     1. DM 2 with hyperglycemia  Admitted with HHS  Now with steroid induced hyperglycemia - on Prednisone taper     While inpatient:  BG target 100-180 mg/dl   Hypoglycemia this AM, may have been contributed by correctional Admelog dose at bedtime   Will decrease Lantus to 38 units SQ for tonight   Continue Admelog to 18 units sq tid ac- hold for NPO  Changed correctional scales to be Admelog MODERATE dosing before meals and standard LOW scale at bedtime   Consistent carbohydrate diet, Nutrition consult (completed)  Check BG before meals and bedtime  Hypoglycemia protocol   Expect insulin requirements to decrease as steroid doses are tapered. Prednisone dose to decrease tomorrow from 30 mg t0 20 mg.   Provider to RN: Insulin pen administration and glucometer teaching prior to discharge. Please send glucometer to [facility] and teach pt with her own glucometer prior to discharge.    Discharge Plan:  Levemir and Novolog PENS with dosing TBD   Discuss glycemic goal of a1c <7% to prevent microvascular complications of diabetes mellitus and reduce the risk of macrovascular complications.  Make sure patient knows how to inject insulin and check fingersticks with glucometer (ask bedside RN for teaching)  Patient's  takes Levemir and Novolog at home and patient reports he will assist her  Ensure patient has working glucometer, test strips, lancets, alcohol pads, and BD sharan pen needles  Please also prescribe glucose tabs, Baqsimi nasal spray or glucagon emergency kit for hypoglycemia risk   Pt should call PMD for DM related questions or concerns until pt is seen by CDE or endocrine   Recommend PCP, endocrinology, podiatry and ophthalmology follow up  Followup scheduled with [facility]: [facility], [address] [place] [address], [address], [place] [address], [phone]   1. With Nurse Practitioner: Linda 9/9/22 at 1:30 PM: May need to reschedule this appt   2. Endocrinologist: Dr. Henderson 11/14/22 12:20 PM    ADDENDUM 12:10  CAPILLARY BLOOD GLUCOSE  301 (05 Sep 2022 11:57)  POCT Blood Glucose.: 301 mg/dL (05 Sep 2022 11:48)  Hyperglycemia at lunch, EMAR review: pre-breakfast Admelog not administered (no dose adjustment made for hypoglycemia and no repeat FS after 87 mg/dL  Continue insulin as ordered above, hyperglycemia likely due to missed pre-breakfast Admelog dose       2. Steroid use  Patient intermittently using prednisone 10mg for SOB, last dose 2 days ago.  Prednisone taper - dose to decrease tomorrow, see above   Steroid induced hyperglycemia - see insulin adjustments as above  Notify endocrine with any updates to steroid plan     3. HTN  BP less than 130/80  titration per primary team    4. HLD  Was on Atorvastatin 10 mg daily --> agree with increase to 20 mg  LDL is not at goal of less than 70 (currently 110)  Follow up lipid panel as outpatient     EDDY Ordaz-BC  Nurse Practitioner   Division of Endocrinology  Pager: NATALIA pager [phone]    If out of hospital/unavailable when paged, please note: patient will be cared for by another provider on the endocrine service.  Please call the endocrine answering service for assistance to reach covering provider ([phone]). For non-urgent matters, please email [email or web address] for assistance.

## 2022-09-06 LAB
ANION GAP SERPL CALC-SCNC: 10 MMOL/L — SIGNIFICANT CHANGE UP (ref 7–14)
BUN SERPL-MCNC: 117 MG/DL — HIGH (ref 7–23)
CALCIUM SERPL-MCNC: 10.4 MG/DL — SIGNIFICANT CHANGE UP (ref 8.4–10.5)
CHLORIDE SERPL-SCNC: 94 MMOL/L — LOW (ref 98–107)
CO2 SERPL-SCNC: 35 MMOL/L — HIGH (ref 22–31)
CREAT SERPL-MCNC: 1.76 MG/DL — HIGH (ref 0.5–1.3)
EGFR: 30 ML/MIN/1.73M2 — LOW
GLUCOSE BLDC GLUCOMTR-MCNC: 125 MG/DL — HIGH (ref 70–99)
GLUCOSE BLDC GLUCOMTR-MCNC: 155 MG/DL — HIGH (ref 70–99)
GLUCOSE BLDC GLUCOMTR-MCNC: 200 MG/DL — HIGH (ref 70–99)
GLUCOSE BLDC GLUCOMTR-MCNC: 218 MG/DL — HIGH (ref 70–99)
GLUCOSE SERPL-MCNC: 179 MG/DL — HIGH (ref 70–99)
HCT VFR BLD CALC: 32.9 % — LOW (ref 34.5–45)
HGB BLD-MCNC: 9.7 G/DL — LOW (ref 11.5–15.5)
MAGNESIUM SERPL-MCNC: 2.3 MG/DL — SIGNIFICANT CHANGE UP (ref 1.6–2.6)
MCHC RBC-ENTMCNC: 29.5 GM/DL — LOW (ref 32–36)
MCHC RBC-ENTMCNC: 30.2 PG — SIGNIFICANT CHANGE UP (ref 27–34)
MCV RBC AUTO: 102.5 FL — HIGH (ref 80–100)
NRBC # BLD: 0 /100 WBCS — SIGNIFICANT CHANGE UP (ref 0–0)
NRBC # FLD: 0.03 K/UL — HIGH (ref 0–0)
PHOSPHATE SERPL-MCNC: 3.3 MG/DL — SIGNIFICANT CHANGE UP (ref 2.5–4.5)
PLATELET # BLD AUTO: 272 K/UL — SIGNIFICANT CHANGE UP (ref 150–400)
POTASSIUM SERPL-MCNC: 4.6 MMOL/L — SIGNIFICANT CHANGE UP (ref 3.5–5.3)
POTASSIUM SERPL-SCNC: 4.6 MMOL/L — SIGNIFICANT CHANGE UP (ref 3.5–5.3)
RBC # BLD: 3.21 M/UL — LOW (ref 3.8–5.2)
RBC # FLD: 14.2 % — SIGNIFICANT CHANGE UP (ref 10.3–14.5)
SODIUM SERPL-SCNC: 139 MMOL/L — SIGNIFICANT CHANGE UP (ref 135–145)
WBC # BLD: 10.57 K/UL — HIGH (ref 3.8–10.5)
WBC # FLD AUTO: 10.57 K/UL — HIGH (ref 3.8–10.5)

## 2022-09-06 PROCEDURE — 71045 X-RAY EXAM CHEST 1 VIEW: CPT | Mod: 26

## 2022-09-06 PROCEDURE — 99232 SBSQ HOSP IP/OBS MODERATE 35: CPT

## 2022-09-06 RX ORDER — INSULIN LISPRO 100/ML
14 VIAL (ML) SUBCUTANEOUS
Refills: 0 | Status: DISCONTINUED | OUTPATIENT
Start: 2022-09-06 | End: 2022-09-07

## 2022-09-06 RX ORDER — INSULIN GLARGINE 100 [IU]/ML
30 INJECTION, SOLUTION SUBCUTANEOUS AT BEDTIME
Refills: 0 | Status: DISCONTINUED | OUTPATIENT
Start: 2022-09-06 | End: 2022-09-07

## 2022-09-06 RX ADMIN — LIDOCAINE 1 PATCH: 4 CREAM TOPICAL at 02:13

## 2022-09-06 RX ADMIN — LIDOCAINE 1 PATCH: 4 CREAM TOPICAL at 11:53

## 2022-09-06 RX ADMIN — Medication 4: at 17:46

## 2022-09-06 RX ADMIN — Medication 18 UNIT(S): at 08:01

## 2022-09-06 RX ADMIN — HEPARIN SODIUM 5000 UNIT(S): 5000 INJECTION INTRAVENOUS; SUBCUTANEOUS at 17:50

## 2022-09-06 RX ADMIN — Medication 50 MILLIGRAM(S): at 06:14

## 2022-09-06 RX ADMIN — PANTOPRAZOLE SODIUM 40 MILLIGRAM(S): 20 TABLET, DELAYED RELEASE ORAL at 06:15

## 2022-09-06 RX ADMIN — Medication 20 MILLIGRAM(S): at 06:14

## 2022-09-06 RX ADMIN — INSULIN GLARGINE 30 UNIT(S): 100 INJECTION, SOLUTION SUBCUTANEOUS at 22:30

## 2022-09-06 RX ADMIN — LEVALBUTEROL 0.63 MILLIGRAM(S): 1.25 SOLUTION, CONCENTRATE RESPIRATORY (INHALATION) at 15:15

## 2022-09-06 RX ADMIN — Medication 325 MILLIGRAM(S): at 11:53

## 2022-09-06 RX ADMIN — Medication 10 MILLIGRAM(S): at 06:15

## 2022-09-06 RX ADMIN — Medication 18 UNIT(S): at 11:53

## 2022-09-06 RX ADMIN — BUMETANIDE 2 MILLIGRAM(S): 0.25 INJECTION INTRAMUSCULAR; INTRAVENOUS at 06:15

## 2022-09-06 RX ADMIN — ATORVASTATIN CALCIUM 20 MILLIGRAM(S): 80 TABLET, FILM COATED ORAL at 22:29

## 2022-09-06 RX ADMIN — Medication 2: at 08:01

## 2022-09-06 RX ADMIN — Medication 14 UNIT(S): at 17:46

## 2022-09-06 RX ADMIN — LEVALBUTEROL 0.63 MILLIGRAM(S): 1.25 SOLUTION, CONCENTRATE RESPIRATORY (INHALATION) at 20:55

## 2022-09-06 RX ADMIN — Medication 10 MILLIGRAM(S): at 22:29

## 2022-09-06 RX ADMIN — HEPARIN SODIUM 5000 UNIT(S): 5000 INJECTION INTRAVENOUS; SUBCUTANEOUS at 00:12

## 2022-09-06 RX ADMIN — BUDESONIDE AND FORMOTEROL FUMARATE DIHYDRATE 2 PUFF(S): 160; 4.5 AEROSOL RESPIRATORY (INHALATION) at 09:20

## 2022-09-06 RX ADMIN — HEPARIN SODIUM 5000 UNIT(S): 5000 INJECTION INTRAVENOUS; SUBCUTANEOUS at 07:31

## 2022-09-06 RX ADMIN — LEVALBUTEROL 0.63 MILLIGRAM(S): 1.25 SOLUTION, CONCENTRATE RESPIRATORY (INHALATION) at 08:19

## 2022-09-06 RX ADMIN — Medication 10 MILLIGRAM(S): at 14:18

## 2022-09-06 NOTE — PROGRESS NOTE ADULT - PROBLEM SELECTOR PROBLEM 1
Diabetes mellitus, new onset

## 2022-09-06 NOTE — PROGRESS NOTE ADULT - PROBLEM SELECTOR PROBLEM 8
Chronic atrial fibrillation

## 2022-09-06 NOTE — PROGRESS NOTE ADULT - PROBLEM SELECTOR PLAN 5
- uses BiPAP at night, unsure of home setting   - c/w nocturnal biPAP  pulm eval
- uses BiPAP at night, unsure of home setting   - c/w nocturnal biPAP  pulm f/u
- uses BiPAP at night, unsure of home setting   - c/w nocturnal biPAP
- uses BiPAP at night, unsure of home setting   - c/w nocturnal biPAP  pulm f/u
- uses BiPAP at night, unsure of home setting   - c/w nocturnal biPAP  pulm eval
- uses BiPAP at night, unsure of home setting   - c/w nocturnal biPAP  pulm eval
- uses BiPAP at night, unsure of home setting   - c/w nocturnal biPAP  pulm f/u
- uses BiPAP at night, unsure of home setting   - c/w nocturnal biPAP  pulm f/u
-AVAPS AE mode- ; epap max 10, epap min 5, max pressure 25; pressure support max - 15, pressure support min 5;   pulm f/u   steroid taper
- uses BiPAP at night, unsure of home setting   - c/w nocturnal biPAP  pulm f/u
- uses BiPAP at night, unsure of home setting   - c/w nocturnal biPAP
-AVAPS AE mode- ; epap max 10, epap min 5, max pressure 25; pressure support max - 15, pressure support min 5;   pulm f/u   steroid taper

## 2022-09-06 NOTE — PROGRESS NOTE ADULT - PROBLEM SELECTOR PLAN 4
- last EF 2020 50%, with notable severe pHTN (last in 2021)   -   - diuresis as per cards  pt states that her resp status is much better
- last EF 2020 50%, with notable severe pHTN (last in 2021)   -   - diuresis as per cards  pt states that her resp status is much better
- last EF 2020 50%, with notable severe pHTN (last in 2021)   -   - diuresis as per cards  -Improving
- last EF 2020 50%, with notable severe pHTN (last in 2021)   -   - diuresis as per cards  pt states that her resp status is much better
- last EF 2020 50%, with notable severe pHTN (last in 2021)   -   - diuresis as per cards
- last EF 2020 50%, with notable severe pHTN (last in 2021)   - will repeat TTE   - c/w bumex 1mg BID
- last EF 2020 50%, with notable severe pHTN (last in 2021)   -   - diuresis as per cards
- last EF 2020 50%, with notable severe pHTN (last in 2021)   -   - diuresis as per cards  pt states that her resp status is much better
- last EF 2020 50%, with notable severe pHTN (last in 2021)   -   - diuresis as per cards  -Improving
- last EF 2020 50%, with notable severe pHTN (last in 2021)   -   - diuresis as per cards  pt states that her resp status is much better
- last EF 2020 50%, with notable severe pHTN (last in 2021)   -   - diuresis as per cards
- last EF 2020 50%, with notable severe pHTN (last in 2021)   -   - diuresis as per cards  pt states that her resp status is much better
- last EF 2020 50%, with notable severe pHTN (last in 2021)   -   - diuresis as per cards  -Improving
- last EF 2020 50%, with notable severe pHTN (last in 2021)   -   - diuresis as per cards  -Improving
- last EF 2020 50%, with notable severe pHTN (last in 2021)   -cont  diuresis   -Improving
- last EF 2020 50%, with notable severe pHTN (last in 2021)   -   - diuresis as per cards  -Improving
- last EF 2020 50%, with notable severe pHTN (last in 2021)   - will repeat TTE   - c/w bumex 1mg BID

## 2022-09-06 NOTE — PROGRESS NOTE ADULT - SUBJECTIVE AND OBJECTIVE BOX
[facility] NEPHROLOGY- PROGRESS NOTE    72 year old Female with history of COPD, CHF presents with weakness. Nephrology consulted for elevated Scr.    REVIEW OF SYSTEMS:  Gen: no fevers  Cards: no chest pain  Resp: + dyspnea  GI: no nausea or vomiting, + diarrhea with decreased PO intake  Vascular: + LE edema    No Known Allergies      Hospital Medications: Medications reviewed      VITALS:  T(F): 97.7 (09-06-22 @ 06:05), Max: 98 (09-05-22 @ 17:35)  HR: 70 (09-06-22 @ 08:37)  BP: 104/72 (09-06-22 @ 06:05)  RR: 19 (09-06-22 @ 06:05)  SpO2: 99% (09-06-22 @ 08:37)  Wt(kg): --        PHYSICAL EXAM:    Gen: NAD, calm  Cards: Irregularly irregular, +S1/S2, no M/G/R  Resp: CTA B/L anteriorly  GI: soft, NT/ND, NABS  Vascular: trace LE edema B/L        LABS:  09-06    139  |  94<L>  |  117<H>  ----------------------------<  179<H>  4.6   |  35<H>  |  1.76<H>    Ca    10.4      06 Sep 2022 05:10  Phos  3.3     09-06  Mg     2.30     09-06      Creatinine Trend: 1.76 <--, 1.66 <--, 1.86 <--, 1.64 <--, 1.54 <--, 1.69 <--, 1.65 <--                        9.7    10.57 )-----------( 272      ( 06 Sep 2022 05:10 )             32.9     Urine Studies:

## 2022-09-06 NOTE — PROGRESS NOTE ADULT - PROBLEM SELECTOR PROBLEM 10
Splint
Need for prophylactic measure

## 2022-09-06 NOTE — PROGRESS NOTE ADULT - PROBLEM SELECTOR PROBLEM 4
Chronic diastolic heart failure
HLD (hyperlipidemia)
HLD (hyperlipidemia)
Hypertension
Chronic diastolic heart failure
Hypertension
Chronic diastolic heart failure
Hypertension
Chronic diastolic heart failure
HLD (hyperlipidemia)
Chronic diastolic heart failure
HLD (hyperlipidemia)
HLD (hyperlipidemia)
Chronic diastolic heart failure
HLD (hyperlipidemia)
Chronic diastolic heart failure

## 2022-09-06 NOTE — PROGRESS NOTE ADULT - PROBLEM SELECTOR PROBLEM 5
MIGUEL treated with BiPAP

## 2022-09-06 NOTE — PROGRESS NOTE ADULT - PROBLEM SELECTOR PLAN 8
- not on any AC, s/p Ablation  - c/w metoprolol 50 qd

## 2022-09-06 NOTE — PROGRESS NOTE ADULT - PROBLEM SELECTOR PLAN 3
- renal fu   monitor given diuresis
- renal fu noted.  monitor given diuresis
- renal fu   monitor given diuresis
- renal fu noted.  monitor given diuresis
- reanl fu   monitor given diuresis
- Cr 1.76, baseline appears (1.3-1.5)  - will hold iv fluids given chf
- renal fu   monitor given diuresis
- renal fu noted.  monitor given diuresis
- renal fu   monitor given diuresis
- renal fu noted.  monitor given diuresis
- renal fu   monitor given diuresis
- renal fu   monitor given diuresis
- reanl fu   monitor given diuresis
- Cr 1.76, baseline appears (1.3-1.5)  - will hold iv fluids given chf
- reanl fu   monitor given diuresis

## 2022-09-06 NOTE — PROGRESS NOTE ADULT - PROBLEM SELECTOR PROBLEM 3
HLD (hyperlipidemia)
Hypertension
Acute kidney injury superimposed on CKD
Hypertension
Acute kidney injury superimposed on CKD
Hypertension
Acute kidney injury superimposed on CKD
HLD (hyperlipidemia)
Hypertension
Acute kidney injury superimposed on CKD
Hypertension
Acute kidney injury superimposed on CKD
HLD (hyperlipidemia)
Hypertension
Acute kidney injury superimposed on CKD

## 2022-09-06 NOTE — PROGRESS NOTE ADULT - PROBLEM SELECTOR PLAN 1
- patient with polyuria, polydipsia, Blood sugar >600, BHB negative, normal AG not consistent with DKA, likely HHS in setting of intermittent use of steroids for COPD (10mg BID)  endo f/u   titrate insulin for optimal blood sugar control
- patient with polyuria, polydipsia, Blood sugar >600, BHB negative, normal AG not consistent with DKA, likely HHS in setting of intermittent use of steroids for COPD (10mg BID)  endo f/u   titrate insulin for optimal blood sugar control    dizziness- neuro eval , started meclizine, pt states its getting better  -Monitor FS.Endocrine f/u
- patient with polyuria, polydipsia, Blood sugar >600, BHB negative, normal AG not consistent with DKA, likely HHS in setting of intermittent use of steroids for COPD (10mg BID)  endo f/u   titrate insulin for optimal blood sugar control    dizziness- neuro f/y, started meclizine, pt states its getting better  -Monitor FS.Endocrine f/u
- patient with polyuria, polydipsia, Blood sugar >600, BHB negative, normal AG not consistent with DKA, likely HHS in setting of intermittent use of steroids for COPD (10mg BID)  - patient has been on and off steroids since January for COPD exacerbations, steroid induced diabetes vs latent T2DM onset/  - HgbA1c 10.7, (previous A1c 5.6(2021), 5.8(2020)); was prescribed empagliflozin earlier in year( per [employer]) but no diagnosis of diabetes in past?  - will start on 10 units qhs with ISS, based on requirements can adjust accordingly  - Endo consult   - f/u MARY, Islet cell antibody, c-peptide       - endocrine consult given new onset
- patient with polyuria, polydipsia, Blood sugar >600, BHB negative, normal AG not consistent with DKA, likely HHS in setting of intermittent use of steroids for COPD (10mg BID)  endo f/u   titrate insulin for optimal blood sugar control    dizziness- neuro eval , started meclizine, pt states its getting better
- patient with polyuria, polydipsia, Blood sugar >600, BHB negative, normal AG not consistent with DKA, likely HHS in setting of intermittent use of steroids for COPD (10mg BID)  endo f/u   titrate insulin for optimal blood sugar control
- patient with polyuria, polydipsia, Blood sugar >600, BHB negative, normal AG not consistent with DKA, likely HHS in setting of intermittent use of steroids for COPD (10mg BID)  endo f/u   titrate insulin for optimal blood sugar control    dizziness- neuro eval , start meclizine
- patient with polyuria, polydipsia, Blood sugar >600, BHB negative, normal AG not consistent with DKA, likely HHS in setting of intermittent use of steroids for COPD (10mg BID)  endo f/u   titrate insulin for optimal blood sugar control    dizziness- neuro eval , started meclizine, pt states its getting better  -Monitor FS.Endocrine f/u
- patient with polyuria, polydipsia, Blood sugar >600, BHB negative, normal AG not consistent with DKA, likely HHS in setting of intermittent use of steroids for COPD (10mg BID)  endo f/u   titrate insulin for optimal blood sugar control    dizziness- neuro f/y, started meclizine, pt states its getting better  -Monitor FS.Endocrine f/u
- patient with polyuria, polydipsia, Blood sugar >600, BHB negative, normal AG not consistent with DKA, likely HHS in setting of intermittent use of steroids for COPD (10mg BID)  endo f/u   titrate insulin for optimal blood sugar control
- patient with polyuria, polydipsia, Blood sugar >600, BHB negative, normal AG not consistent with DKA, likely HHS in setting of intermittent use of steroids for COPD (10mg BID)  endo f/u   titrate insulin for optimal blood sugar control
- patient with polyuria, polydipsia, Blood sugar >600, BHB negative, normal AG not consistent with DKA, likely HHS in setting of intermittent use of steroids for COPD (10mg BID)  endo f/u   titrate insulin for optimal blood sugar control    dizziness- neuro eval , started meclizine, pt states its getting better  -Monitor FS.Endocrine f/u
- patient with polyuria, polydipsia, Blood sugar >600, BHB negative, normal AG not consistent with DKA, likely HHS in setting of intermittent use of steroids for COPD (10mg BID)  - patient has been on and off steroids since January for COPD exacerbations, steroid induced diabetes vs latent T2DM onset/  - HgbA1c 10.7, (previous A1c 5.6(2021), 5.8(2020)); was prescribed empagliflozin earlier in year( per [employer]) but no diagnosis of diabetes in past?  -endo f/u   titrate insulin for optimal blood sugar control
- patient with polyuria, polydipsia, Blood sugar >600, BHB negative, normal AG not consistent with DKA, likely HHS in setting of intermittent use of steroids for COPD (10mg BID)  endo f/u   titrate insulin for optimal blood sugar control    dizziness- neuro eval , started meclizine, pt states its getting better  -Monitor FS.Endocrine f/u
- patient with polyuria, polydipsia, Blood sugar >600, BHB negative, normal AG not consistent with DKA, likely HHS in setting of intermittent use of steroids for COPD (10mg BID)  endo f/u   titrate insulin for optimal blood sugar control    dizziness- neuro eval , started meclizine, pt states its getting better  -Monitor FS
- patient with polyuria, polydipsia, Blood sugar >600, BHB negative, normal AG not consistent with DKA, likely HHS in setting of intermittent use of steroids for COPD (10mg BID)  - patient has been on and off steroids since January for COPD exacerbations, steroid induced diabetes vs latent T2DM onset/  - HgbA1c 10.7, (previous A1c 5.6(2021), 5.8(2020)); was prescribed empagliflozin earlier in year( per [employer]) but no diagnosis of diabetes in past?  - will start on 10 units qhs with ISS, based on requirements can adjust accordingly  - Endo consult   - f/u MARY, Islet cell antibody, c-peptide       - endocrine consult given new onset
- patient with polyuria, polydipsia, Blood sugar >600, BHB negative, normal AG not consistent with DKA, likely HHS in setting of intermittent use of steroids for COPD (10mg BID)  endo f/u   titrate insulin for optimal blood sugar control

## 2022-09-06 NOTE — PROGRESS NOTE ADULT - SUBJECTIVE AND OBJECTIVE BOX
PULMONARY PROGRESS NOTE    DAMARI GUERRERO  MRN-[id number]    Patient is a 72y old  Female who presents with a chief complaint of lightheadedness and fatigue for 5 days (06 Sep 2022 10:41)      HPI:  -  -    ROS:   -    ACTIVE MEDICATION LIST:  MEDICATIONS  (STANDING):  atorvastatin 20 milliGRAM(s) Oral at bedtime  budesonide 160 MICROgram(s)/formoterol 4.5 MICROgram(s) Inhaler 2 Puff(s) Inhalation two times a day  buMETAnide 2 milliGRAM(s) Oral daily  dextrose 5%. 1000 milliLiter(s) (100 mL/Hr) IV Continuous <Continuous>  dextrose 5%. 1000 milliLiter(s) (50 mL/Hr) IV Continuous <Continuous>  dextrose 50% Injectable 25 Gram(s) IV Push once  dextrose 50% Injectable 12.5 Gram(s) IV Push once  dextrose 50% Injectable 25 Gram(s) IV Push once  ferrous    sulfate 325 milliGRAM(s) Oral daily  glucagon  Injectable 1 milliGRAM(s) IntraMuscular once  heparin   Injectable 5000 Unit(s) SubCutaneous every 8 hours  hydrALAZINE 10 milliGRAM(s) Oral three times a day  insulin glargine Injectable (LANTUS) 38 Unit(s) SubCutaneous at bedtime  insulin lispro (ADMELOG) corrective regimen sliding scale   SubCutaneous three times a day before meals  insulin lispro (ADMELOG) corrective regimen sliding scale   SubCutaneous at bedtime  insulin lispro Injectable (ADMELOG) 18 Unit(s) SubCutaneous three times a day before meals  levalbuterol Inhalation 0.63 milliGRAM(s) Inhalation every 6 hours  lidocaine   4% Patch 1 Patch Transdermal daily  meclizine 12.5 milliGRAM(s) Oral three times a day  metoprolol succinate ER 50 milliGRAM(s) Oral daily  pantoprazole    Tablet 40 milliGRAM(s) Oral before breakfast  predniSONE   Tablet 20 milliGRAM(s) Oral daily    MEDICATIONS  (PRN):  acetaminophen     Tablet .. 650 milliGRAM(s) Oral every 6 hours PRN Mild Pain (1 - 3), Moderate Pain (4 - 6), Severe Pain (7 - 10)  ALBUTerol    90 MICROgram(s) HFA Inhaler 2 Puff(s) Inhalation every 6 hours PRN Shortness of Breath and/or Wheezing  dextrose Oral Gel 15 Gram(s) Oral once PRN Blood Glucose LESS THAN 70 milliGRAM(s)/deciliter      EXAM:  Vital Signs Last 24 Hrs  T(C): 36.5 (06 Sep 2022 06:05), Max: 36.7 (05 Sep 2022 17:35)  T(F): 97.7 (06 Sep 2022 06:05), Max: 98 (05 Sep 2022 17:35)  HR: 70 (06 Sep 2022 08:37) (63 - 75)  BP: 104/72 (06 Sep 2022 06:05) (104/72 - 160/79)  BP(mean): --  RR: 19 (06 Sep 2022 06:05) (18 - 19)  SpO2: 99% (06 Sep 2022 08:37) (99% - 100%)    Parameters below as of 06 Sep 2022 08:37  Patient On (Oxygen Delivery Method): nasal cannula        GENERAL: The patient is awake and alert in no apparent distress.     LUNGS: Clear to auscultation without wheezing, rales or rhonchi; respirations unlabored    HEART: Regular rate and rhythm without murmur.                            9.7    10.57 )-----------( 272      ( 06 Sep 2022 05:10 )             32.9       09-06    139  |  94<L>  |  117<H>  ----------------------------<  179<H>  4.6   |  35<H>  |  1.76<H>    Ca    10.4      06 Sep 2022 05:10  Phos  3.3     09-06  Mg     2.30     09-06      >>> <<<    PROBLEM LIST:  72y Female with HEALTH ISSUES - PROBLEM Dx:  Hyperglycemia due to diabetes mellitus    Chronic diastolic heart failure    MIGUEL treated with BiPAP    COPD, severe    Chronic atrial fibrillation    Need for prophylactic measure    HLD (hyperlipidemia)    Diabetes mellitus, new onset    Hypertension    Acute kidney injury superimposed on CKD    Steroid-induced hyperglycemia              RECS:        Please call with any questions.    Irma Eaton DO  [facility] Pulmonary/Sleep Medicine  [phone]   PULMONARY PROGRESS NOTE    DAMARI GUERRERO  MRN-[id number]    Patient is a 72y old  Female who presents with a chief complaint of lightheadedness and fatigue for 5 days (06 Sep 2022 10:41)      HPI:  - remains on nc  -    ROS:   -    ACTIVE MEDICATION LIST:  MEDICATIONS  (STANDING):  atorvastatin 20 milliGRAM(s) Oral at bedtime  budesonide 160 MICROgram(s)/formoterol 4.5 MICROgram(s) Inhaler 2 Puff(s) Inhalation two times a day  buMETAnide 2 milliGRAM(s) Oral daily  dextrose 5%. 1000 milliLiter(s) (100 mL/Hr) IV Continuous <Continuous>  dextrose 5%. 1000 milliLiter(s) (50 mL/Hr) IV Continuous <Continuous>  dextrose 50% Injectable 25 Gram(s) IV Push once  dextrose 50% Injectable 12.5 Gram(s) IV Push once  dextrose 50% Injectable 25 Gram(s) IV Push once  ferrous    sulfate 325 milliGRAM(s) Oral daily  glucagon  Injectable 1 milliGRAM(s) IntraMuscular once  heparin   Injectable 5000 Unit(s) SubCutaneous every 8 hours  hydrALAZINE 10 milliGRAM(s) Oral three times a day  insulin glargine Injectable (LANTUS) 38 Unit(s) SubCutaneous at bedtime  insulin lispro (ADMELOG) corrective regimen sliding scale   SubCutaneous three times a day before meals  insulin lispro (ADMELOG) corrective regimen sliding scale   SubCutaneous at bedtime  insulin lispro Injectable (ADMELOG) 18 Unit(s) SubCutaneous three times a day before meals  levalbuterol Inhalation 0.63 milliGRAM(s) Inhalation every 6 hours  lidocaine   4% Patch 1 Patch Transdermal daily  meclizine 12.5 milliGRAM(s) Oral three times a day  metoprolol succinate ER 50 milliGRAM(s) Oral daily  pantoprazole    Tablet 40 milliGRAM(s) Oral before breakfast  predniSONE   Tablet 20 milliGRAM(s) Oral daily    MEDICATIONS  (PRN):  acetaminophen     Tablet .. 650 milliGRAM(s) Oral every 6 hours PRN Mild Pain (1 - 3), Moderate Pain (4 - 6), Severe Pain (7 - 10)  ALBUTerol    90 MICROgram(s) HFA Inhaler 2 Puff(s) Inhalation every 6 hours PRN Shortness of Breath and/or Wheezing  dextrose Oral Gel 15 Gram(s) Oral once PRN Blood Glucose LESS THAN 70 milliGRAM(s)/deciliter      EXAM:  Vital Signs Last 24 Hrs  T(C): 36.5 (06 Sep 2022 06:05), Max: 36.7 (05 Sep 2022 17:35)  T(F): 97.7 (06 Sep 2022 06:05), Max: 98 (05 Sep 2022 17:35)  HR: 70 (06 Sep 2022 08:37) (63 - 75)  BP: 104/72 (06 Sep 2022 06:05) (104/72 - 160/79)  BP(mean): --  RR: 19 (06 Sep 2022 06:05) (18 - 19)  SpO2: 99% (06 Sep 2022 08:37) (99% - 100%)    Parameters below as of 06 Sep 2022 08:37  Patient On (Oxygen Delivery Method): nasal cannula        GENERAL: The patient is awake and alert in no apparent distress.     LUNGS: Clear to auscultation without wheezing, rales or rhonchi; respirations unlabored                             9.7    10.57 )-----------( 272      ( 06 Sep 2022 05:10 )             32.9       09-06    139  |  94<L>  |  117<H>  ----------------------------<  179<H>  4.6   |  35<H>  |  1.76<H>    Ca    10.4      06 Sep 2022 05:10  Phos  3.3     09-06  Mg     2.30     09-06       < from: Xray Chest 1 View- PORTABLE-Urgent (Xray Chest 1 View- PORTABLE-Urgent .) (08.29.22 @ 09:49) >    ACC: [id number] EXAM:  XR CHEST PORTABLE URGENT 1V                        ACC: [id number] EXAM:  XR CHEST PORTABLE URGENT 1V                          PROCEDURE DATE:  08/29/2022          INTERPRETATION:  CLINICAL INFORMATION: Dyspnea    TIME OF EXAMINATION: August 27, 2022 at 1:24 PM and August 29 at 8:45 AM    EXAM: Portable chest    FINDINGS:  August 27 at 1:24 PM:  There are no focal consolidations. Body habitus limits evaluation. There   is a perihilar haze may be overlying soft tissues versus mild edema.   Heart appears enlarged but stable. Sternotomy wires are present.    August 29 at 8:45 AM:  No interval change. Perihilar haze with mild interstitial prominence   consistent with CHF. No focal consolidations to suggest pneumonia. No   pneumothorax.    COMPARISON: August 23      IMPRESSION:  *  No focal consolidations.  *  Mild pulmonary edema.    --- End of Report ---            DELIA PEDERSEN MD; Attending Radiologist    < end of copied text >      PROBLEM LIST:  72y Female with HEALTH ISSUES - PROBLEM Dx:  Hyperglycemia due to diabetes mellitus    Chronic diastolic heart failure    MIGUEL treated with BiPAP    COPD, severe    Chronic atrial fibrillation    Need for prophylactic measure    HLD (hyperlipidemia)    Diabetes mellitus, new onset    Hypertension    Acute kidney injury superimposed on CKD    Steroid-induced hyperglycemia             RECS:  spoke to DME company- she has an avaps at home-   AVAPS AE mode- ; epap max 10, epap min 5, max pressure 25; pressure support max - 15, pressure support min 5;   diuresis per renal  prednisone taper as per primary team  dvt prophylaxis        Please call with any questions.    Irma Eaton DO  [facility] Pulmonary/Sleep Medicine  [phone]

## 2022-09-06 NOTE — PROGRESS NOTE ADULT - PROBLEM SELECTOR PLAN 9
- c/w simvastatin

## 2022-09-06 NOTE — PROGRESS NOTE ADULT - PROBLEM SELECTOR PROBLEM 2
Steroid-induced hyperglycemia
HLD (hyperlipidemia)
Steroid-induced hyperglycemia
Hyperglycemia due to diabetes mellitus
Hyperglycemia due to diabetes mellitus
Steroid-induced hyperglycemia
Hyperglycemia due to diabetes mellitus
Hyperglycemia due to diabetes mellitus
Steroid-induced hyperglycemia
Hyperglycemia due to diabetes mellitus
Steroid-induced hyperglycemia
Steroid-induced hyperglycemia
Hyperglycemia due to diabetes mellitus
Hyperglycemia due to diabetes mellitus
HLD (hyperlipidemia)
Hyperglycemia due to diabetes mellitus

## 2022-09-06 NOTE — PROGRESS NOTE ADULT - SUBJECTIVE AND OBJECTIVE BOX
Patient seen and examined this am. No new events    MEDICATIONS:    acetaminophen     Tablet .. 650 milliGRAM(s) Oral every 6 hours PRN  ALBUTerol    90 MICROgram(s) HFA Inhaler 2 Puff(s) Inhalation every 6 hours PRN  atorvastatin 20 milliGRAM(s) Oral at bedtime  budesonide 160 MICROgram(s)/formoterol 4.5 MICROgram(s) Inhaler 2 Puff(s) Inhalation two times a day  buMETAnide 2 milliGRAM(s) Oral daily  dextrose 5%. 1000 milliLiter(s) IV Continuous <Continuous>  dextrose 5%. 1000 milliLiter(s) IV Continuous <Continuous>  dextrose 50% Injectable 25 Gram(s) IV Push once  dextrose 50% Injectable 12.5 Gram(s) IV Push once  dextrose 50% Injectable 25 Gram(s) IV Push once  dextrose Oral Gel 15 Gram(s) Oral once PRN  ferrous    sulfate 325 milliGRAM(s) Oral daily  glucagon  Injectable 1 milliGRAM(s) IntraMuscular once  heparin   Injectable 5000 Unit(s) SubCutaneous every 8 hours  hydrALAZINE 10 milliGRAM(s) Oral three times a day  insulin glargine Injectable (LANTUS) 38 Unit(s) SubCutaneous at bedtime  insulin lispro (ADMELOG) corrective regimen sliding scale   SubCutaneous three times a day before meals  insulin lispro (ADMELOG) corrective regimen sliding scale   SubCutaneous at bedtime  insulin lispro Injectable (ADMELOG) 18 Unit(s) SubCutaneous three times a day before meals  levalbuterol Inhalation 0.63 milliGRAM(s) Inhalation every 6 hours  lidocaine   4% Patch 1 Patch Transdermal daily  meclizine 12.5 milliGRAM(s) Oral three times a day  metoprolol succinate ER 50 milliGRAM(s) Oral daily  pantoprazole    Tablet 40 milliGRAM(s) Oral before breakfast  predniSONE   Tablet 20 milliGRAM(s) Oral daily      LABS:                          9.7    10.57 )-----------( 272      ( 06 Sep 2022 05:10 )             32.9     09-06    139  |  94<L>  |  117<H>  ----------------------------<  179<H>  4.6   |  35<H>  |  1.76<H>    Ca    10.4      06 Sep 2022 05:10  Phos  3.3     09-06  Mg     2.30     09-06      CAPILLARY BLOOD GLUCOSE      POCT Blood Glucose.: 125 mg/dL (06 Sep 2022 11:30)  POCT Blood Glucose.: 200 mg/dL (06 Sep 2022 07:39)  POCT Blood Glucose.: 213 mg/dL (05 Sep 2022 21:06)  POCT Blood Glucose.: 280 mg/dL (05 Sep 2022 16:24)        I&O's Summary    Vital Signs Last 24 Hrs  T(C): 36.4 (06 Sep 2022 11:05), Max: 36.7 (05 Sep 2022 17:35)  T(F): 97.5 (06 Sep 2022 11:05), Max: 98 (05 Sep 2022 17:35)  HR: 59 (06 Sep 2022 11:05) (59 - 75)  BP: 129/72 (06 Sep 2022 11:05) (104/72 - 160/79)  BP(mean): --  RR: 19 (06 Sep 2022 11:05) (18 - 19)  SpO2: 100% (06 Sep 2022 11:05) (99% - 100%)    Parameters below as of 06 Sep 2022 11:05  Patient On (Oxygen Delivery Method): nasal cannula  O2 Flow (L/min): 3        On Neurological Examination:    Mental Status - Patient is alert, awake, oriented X3. fluent, names, no dysarthria no aphasia Follows commands well and able to answer questions appropriately. Mood and affect  normal    Cranial Nerves - PERRL, EOMI, VFF, V1-V3 intact, no gross facial asymmetry, tongue/uvula midline    Motor Exam -   no drift    Sensory    Intact to light touch and pinprick bilaterally    Coord: FTN intact bilaterally     Gait -  normal      Reflexes:        2+ UEs trace LEs         RADIOLOGY  [facility]

## 2022-09-06 NOTE — PROGRESS NOTE ADULT - PROBLEM SELECTOR PLAN 2
-plan as above
-
-plan as above
-
-plan as above

## 2022-09-06 NOTE — PROGRESS NOTE ADULT - SUBJECTIVE AND OBJECTIVE BOX
SUBJECTIVE / OVERNIGHT EVENTS:t seen and examined still with uncontrolled blood sugars sec to steroids  09-06-22     MEDICATIONS  (STANDING):  atorvastatin 20 milliGRAM(s) Oral at bedtime  budesonide 160 MICROgram(s)/formoterol 4.5 MICROgram(s) Inhaler 2 Puff(s) Inhalation two times a day  buMETAnide 2 milliGRAM(s) Oral daily  dextrose 5%. 1000 milliLiter(s) (50 mL/Hr) IV Continuous <Continuous>  dextrose 5%. 1000 milliLiter(s) (100 mL/Hr) IV Continuous <Continuous>  dextrose 50% Injectable 25 Gram(s) IV Push once  dextrose 50% Injectable 12.5 Gram(s) IV Push once  dextrose 50% Injectable 25 Gram(s) IV Push once  ferrous    sulfate 325 milliGRAM(s) Oral daily  glucagon  Injectable 1 milliGRAM(s) IntraMuscular once  heparin   Injectable 5000 Unit(s) SubCutaneous every 8 hours  hydrALAZINE 10 milliGRAM(s) Oral three times a day  insulin glargine Injectable (LANTUS) 30 Unit(s) SubCutaneous at bedtime  insulin lispro (ADMELOG) corrective regimen sliding scale   SubCutaneous three times a day before meals  insulin lispro (ADMELOG) corrective regimen sliding scale   SubCutaneous at bedtime  insulin lispro Injectable (ADMELOG) 14 Unit(s) SubCutaneous three times a day before meals  levalbuterol Inhalation 0.63 milliGRAM(s) Inhalation every 6 hours  lidocaine   4% Patch 1 Patch Transdermal daily  meclizine 12.5 milliGRAM(s) Oral three times a day  metoprolol succinate ER 50 milliGRAM(s) Oral daily  pantoprazole    Tablet 40 milliGRAM(s) Oral before breakfast  predniSONE   Tablet 20 milliGRAM(s) Oral daily    MEDICATIONS  (PRN):  acetaminophen     Tablet .. 650 milliGRAM(s) Oral every 6 hours PRN Mild Pain (1 - 3), Moderate Pain (4 - 6), Severe Pain (7 - 10)  ALBUTerol    90 MICROgram(s) HFA Inhaler 2 Puff(s) Inhalation every 6 hours PRN Shortness of Breath and/or Wheezing  dextrose Oral Gel 15 Gram(s) Oral once PRN Blood Glucose LESS THAN 70 milliGRAM(s)/deciliter    T(C): 36.4 (09-06-22 @ 18:59), Max: 36.5 (09-06-22 @ 06:05)  HR: 74 (09-06-22 @ 18:59) (59 - 74)  BP: 122/70 (09-06-22 @ 18:59) (104/72 - 129/72)  RR: 18 (09-06-22 @ 18:59) (18 - 19)  SpO2: 97% (09-06-22 @ 18:59) (97% - 100%)    CAPILLARY BLOOD GLUCOSE      POCT Blood Glucose.: 218 mg/dL (06 Sep 2022 16:52)  POCT Blood Glucose.: 125 mg/dL (06 Sep 2022 11:30)  POCT Blood Glucose.: 200 mg/dL (06 Sep 2022 07:39)  POCT Blood Glucose.: 213 mg/dL (05 Sep 2022 21:06)    I&O's Summary      Constitutional: No fever, fatigue  Skin: No rash.  Eyes: No recent vision problems or eye pain.  ENT: No congestion, ear pain, or sore throat.  Cardiovascular: No chest pain or palpation.  Respiratory: No cough, shortness of breath, congestion, or wheezing.  Gastrointestinal: No abdominal pain, nausea, vomiting, or diarrhea.  Genitourinary: No dysuria.  Musculoskeletal: No joint swelling.  Neurologic: No headache.    PHYSICAL EXAM:  GENERAL: NAD  EYES: EOMI, PERRLA  NECK: Supple, No JVD  CHEST/LUNG: crackles at bases   HEART:  S1 , S2 +  ABDOMEN: soft , bs+  EXTREMITIES:  edema+  NEUROLOGY:alert awake oriented       LABS:                        9.7    10.57 )-----------( 272      ( 06 Sep 2022 05:10 )             32.9     09-06    139  |  94<L>  |  117<H>  ----------------------------<  179<H>  4.6   |  35<H>  |  1.76<H>    Ca    10.4      06 Sep 2022 05:10  Phos  3.3     09-06  Mg     2.30     09-06                RADIOLOGY & ADDITIONAL TESTS:    Imaging Personally Reviewed:    Consultant(s) Notes Reviewed:      Care Discussed with Consultants/Other Providers:

## 2022-09-06 NOTE — PROGRESS NOTE ADULT - SUBJECTIVE AND OBJECTIVE BOX
PATIENT SEEN AND EXAMINED ON :- 9/6/22  DATE OF SERVICE:   9/6/22          Interim events noted,Labs ,Radiological studies and Cardiology tests reviewed .       HOSPITAL COURSE: HPI:  73 y/o F with pmhx of COPD on home O2(3L) with severe pHTN, OHS/MIGUEL on home biPAP, HTN, HLD who presents with 5 days of lightheadedness and fatigue. Patient states that she has been feeling off for the last few days .Endorse increased fatigue stating she feels "very slow".  She state that at times she feels like the room is spinning.  She denies any fevers, chills, headaches chest pain or increased cough. She has SOB at baseline and uses 3L O2 continuously she denies any increased SOB. She endorse vision changes, stating that "everything seems darker even when there is light outside". She denies any blurry vision or loss of vision. She also endorse polydipsia nad polyuria. Denies polyphagia but does endorse decreased appetite. She states that she sleeps with 3 pillows at night which is normal for her. Also endorse nocturnal dyspnea that has improved since she has been using her bipap at night.   Patient states that she has been using prednisone intermittently for the last month for SOB. She states she was prescribed 10mg BID and states she only takes the medication when she is feeling more dyspnea that her baseline. She states her last dose was 2 days ago. She states that her symptoms progressively worsened to the pint that she felt helpless and decided to call EMS. In EMS her sugars were >500.     In the ED T. BS were measures >600 x2. She was given 5 units of insulin with improvement to 300s. BHB negative. Admitted to medicine for further management.     (24 Aug 2022 08:35)      INTERIM EVENTS:Patient seen at bedside ,interim events noted.      PMH -reviewed admission note, no change since admission  HEART FAILURE: Acute[ ]Chronic[ ] Systolic[ ] Diastolic[ ] Combined Systolic and Diastolic[ ]  CAD[ ] CABG[ ] PCI[ ]  DEVICES[ ] PPM[ ] ICD[ ] ILR[ ]  ATRIAL FIBRILLATION[ ] Paroxysmal[ ] Permanent[ ] CHADS2-[  ]  MISAEL[ ] CKD1[ ] CKD2[ ] CKD3[ ] CKD4[ ] ESRD[ ]  COPD[ ] HTN[ ]   DM[ ] Type1[ ] Type 2[ ]   CVA[ ] Paresis[ ]    AMBULATION: Assisted[ ] Cane/walker[ ] Independent[ ]    MEDICATIONS  (STANDING):  atorvastatin 20 milliGRAM(s) Oral at bedtime  budesonide 160 MICROgram(s)/formoterol 4.5 MICROgram(s) Inhaler 2 Puff(s) Inhalation two times a day  buMETAnide 2 milliGRAM(s) Oral daily  dextrose 5%. 1000 milliLiter(s) (50 mL/Hr) IV Continuous <Continuous>  dextrose 5%. 1000 milliLiter(s) (100 mL/Hr) IV Continuous <Continuous>  dextrose 50% Injectable 25 Gram(s) IV Push once  dextrose 50% Injectable 12.5 Gram(s) IV Push once  dextrose 50% Injectable 25 Gram(s) IV Push once  ferrous    sulfate 325 milliGRAM(s) Oral daily  glucagon  Injectable 1 milliGRAM(s) IntraMuscular once  heparin   Injectable 5000 Unit(s) SubCutaneous every 8 hours  hydrALAZINE 10 milliGRAM(s) Oral three times a day  insulin glargine Injectable (LANTUS) 30 Unit(s) SubCutaneous at bedtime  insulin lispro (ADMELOG) corrective regimen sliding scale   SubCutaneous three times a day before meals  insulin lispro (ADMELOG) corrective regimen sliding scale   SubCutaneous at bedtime  insulin lispro Injectable (ADMELOG) 14 Unit(s) SubCutaneous three times a day before meals  levalbuterol Inhalation 0.63 milliGRAM(s) Inhalation every 6 hours  lidocaine   4% Patch 1 Patch Transdermal daily  meclizine 12.5 milliGRAM(s) Oral three times a day  metoprolol succinate ER 50 milliGRAM(s) Oral daily  pantoprazole    Tablet 40 milliGRAM(s) Oral before breakfast  predniSONE   Tablet 20 milliGRAM(s) Oral daily    MEDICATIONS  (PRN):  acetaminophen     Tablet .. 650 milliGRAM(s) Oral every 6 hours PRN Mild Pain (1 - 3), Moderate Pain (4 - 6), Severe Pain (7 - 10)  ALBUTerol    90 MICROgram(s) HFA Inhaler 2 Puff(s) Inhalation every 6 hours PRN Shortness of Breath and/or Wheezing  dextrose Oral Gel 15 Gram(s) Oral once PRN Blood Glucose LESS THAN 70 milliGRAM(s)/deciliter            REVIEW OF SYSTEMS:  Constitutional: [ ] fever, [ ]weight loss,  [ ]fatigue [ ]weight gain  Eyes: [ ] visual changes  Respiratory: [ ]shortness of breath;  [ ] cough, [ ]wheezing, [ ]chills, [ ]hemoptysis  Cardiovascular: [ ] chest pain, [ ]palpitations, [ ]dizziness,  [ ]leg swelling[ ]orthopnea[ ]PND  Gastrointestinal: [ ] abdominal pain, [ ]nausea, [ ]vomiting,  [ ]diarrhea [ ]Constipation [ ]Melena  Genitourinary: [ ] dysuria, [ ] hematuria [ ]Junior  Neurologic: [ ] headaches [ ] tremors[ ]weakness [ ]Paralysis Right[ ] Left[ ]  Skin: [ ] itching, [ ]burning, [ ] rashes  Endocrine: [ ] heat or cold intolerance  Musculoskeletal: [ ] joint pain or swelling; [ ] muscle, back, or extremity pain  Psychiatric: [ ] depression, [ ]anxiety, [ ]mood swings, or [ ]difficulty sleeping  Hematologic: [ ] easy bruising, [ ] bleeding gums    [ ] All remaining systems negative except as per above.   [ ]Unable to obtain.  [x] No change in ROS since admission      Vital Signs Last 24 Hrs  T(C): 36.4 (06 Sep 2022 18:59), Max: 36.5 (06 Sep 2022 06:05)  T(F): 97.6 (06 Sep 2022 18:59), Max: 97.7 (06 Sep 2022 06:05)  HR: 84 (06 Sep 2022 20:58) (59 - 84)  BP: 122/70 (06 Sep 2022 18:59) (104/72 - 129/72)  BP(mean): --  RR: 18 (06 Sep 2022 18:59) (18 - 19)  SpO2: 100% (06 Sep 2022 20:58) (97% - 100%)    Parameters below as of 06 Sep 2022 20:58  Patient On (Oxygen Delivery Method): nasal cannula      I&O's Summary      PHYSICAL EXAM:  General: No acute distress BMI-  HEENT: EOMI, PERRL  Neck: Supple, [ ] JVD  Lungs: Equal air entry bilaterally; [ ] rales [ ] wheezing [ ] rhonchi  Heart: Regular rate and rhythm; [x ] murmur   2/6 [ x] systolic [ ] diastolic [ ] radiation[ ] rubs [ ]  gallops  Abdomen: Nontender, bowel sounds present  Extremities: No clubbing, cyanosis, [ ] edema [ ]Pulses  equal and intact  Nervous system:  Alert & Oriented X3, no focal deficits  Psychiatric: Normal affect  Skin: No rashes or lesions    LABS:  09-06    139  |  94<L>  |  117<H>  ----------------------------<  179<H>  4.6   |  35<H>  |  1.76<H>    Ca    10.4      06 Sep 2022 05:10  Phos  3.3     09-06  Mg     2.30     09-06      Creatinine Trend: 1.76<--, 1.66<--, 1.86<--, 1.64<--, 1.54<--, 1.69<--                        9.7    10.57 )-----------( 272      ( 06 Sep 2022 05:10 )             32.9

## 2022-09-06 NOTE — PROGRESS NOTE ADULT - ASSESSMENT
71 y/o F with pmhx of COPD on home O2(3L) with severe pHTN, OHS/MIGUEL on home biPAP, HTN, HLD who presents with 5 days of lightheadedness and fatigue found to be in HHS, PE non-focal    Impression: weakness likely in setting of medical illness, low suspicion for central process      supportive care

## 2022-09-06 NOTE — PROGRESS NOTE ADULT - PROBLEM SELECTOR PROBLEM 7
COPD, severe

## 2022-09-06 NOTE — PROGRESS NOTE ADULT - ASSESSMENT
72 year old Female with history of COPD, CHF presents with weakness. Nephrology consulted for elevated Scr.    1) MISAEL: Secondary to CRS? MISAEL resolved. UA bland. FeUrea low. Bladder scan not performed. Avoid nephrotoxins.    2) CKD-3b: Baseline Scr 1.4-1.6 with CKD likely due to DM. Outpatient CKD work up. Monitor electrolytes.    3) HTN with CKD: BP controlled. Continue with current medications. Monitor BP.    4) LE edema: Improving. Continue with bumex 2 mg PO daily. Prior TTE with grossly normal LVSF. Repeat CXR and please check strict I/O's. Monitor UO.    5) L renal mass: Patient refusing inpatient work up. Outpatient Urology evaluation.    6) Hyperkalemia: In setting of hyperglycemia. S/P lokelma. Continue with low K diet. Monitor serum K.    7) Anemia of renal disease: Hb low. Check AM iron stores. Will consider CHACORTA pending results. Monitor Hb.      [facility] NEPHROLOGY  Rodrigue Amador M.D.  Rob Perez D.O.  Ana Song M.D.  Lucrecia López, ALLISON, ANP-C    Telephone: [phone]  Facsimile: [phone]    [id number] [id number]

## 2022-09-06 NOTE — PROGRESS NOTE ADULT - PROBLEM SELECTOR PLAN 7
- on Home O2 3L continuously  - last echo in 2021,consistent with severe pHTN   - continue with O2, maintain O2 between 88-92%  - c/w symbicort,  alubterol PRN

## 2022-09-06 NOTE — PROGRESS NOTE ADULT - TIME BILLING
-D/W ACP
- Review of records, telemetry, vital signs and daily labs.   - General and cardiovascular physical examination.  - Generation of cardiovascular treatment plan.  - Coordination of care.      Patient was seen and examined by me on 8/26/22,interim events noted,labs and radiology studies reviewed.  Edwar Merida MD,FACC.  [address] [place], [place] [id number].  [phone]
- Review of records, telemetry, vital signs and daily labs.   - General and cardiovascular physical examination.  - Generation of cardiovascular treatment plan.  - Coordination of care.      Patient was seen and examined by me on 9/5/22,interim events noted,labs and radiology studies reviewed.  Edwar Merida MD,FACC.  [address] [place], [place] [id number].  [phone]
- Review of records, telemetry, vital signs and daily labs.   - General and cardiovascular physical examination.  - Generation of cardiovascular treatment plan.  - Coordination of care.      Patient was seen and examined by me on 9/4/22,interim events noted,labs and radiology studies reviewed.  Edwar Merida MD,FACC.  [address] [place], [place] [id number].  [phone]
- Review of records, telemetry, vital signs and daily labs.   - General and cardiovascular physical examination.  - Generation of cardiovascular treatment plan.  - Coordination of care.      Patient was seen and examined by me on 9/6/22,interim events noted,labs and radiology studies reviewed.  Edwar Merida MD,FACC.  [address] [place], [place] [id number].  [phone]
- Review of records, telemetry, vital signs and daily labs.   - General and cardiovascular physical examination.  - Generation of cardiovascular treatment plan.  - Coordination of care.      Patient was seen and examined by me on 9/3/22,interim events noted,labs and radiology studies reviewed.  Edwar Merida MD,FACC.  [address] [place], [place] [id number].  [phone]
-D/W ACP  -PT.D/C PLANNING.PULM F/U NOTED.Patient can use avaps at home
-D/W ACP

## 2022-09-06 NOTE — PROGRESS NOTE ADULT - PROBLEM SELECTOR PLAN 6
titrate bp meds for optimal bp control
- c/w hydralazine 25 TID
titrate bp meds for optimal bp control
titrate bp meds for optimal bp control
- c/w hydralazine 25 TID
titrate bp meds for optimal bp control

## 2022-09-06 NOTE — CHART NOTE - NSCHARTNOTEFT_GEN_A_CORE
Chart reviewed  Prednisopne dose lowered  So reduced doses to Lantus 30 units qhs and admelog 14 units sq tid ac- hold if npo  continue correction scales as ordered.   Please contact endocrine team prior to d/c  [email or web address]  See prior notes for full poc    A1C with Estimated Average Glucose (08.23.22 @ 23:18)    A1C with Estimated Average Glucose Result: 10.7 %    Estimated Average Glucose: 260      09-06    139  |  94<L>  |  117<H>  ----------------------------<  179<H>  4.6   |  35<H>  |  1.76<H>    Ca    10.4      06 Sep 2022 05:10  Phos  3.3     09-06  Mg     2.30     09-06        MEDICATIONS  (STANDING):  atorvastatin 20 milliGRAM(s) Oral at bedtime  budesonide 160 MICROgram(s)/formoterol 4.5 MICROgram(s) Inhaler 2 Puff(s) Inhalation two times a day  buMETAnide 2 milliGRAM(s) Oral daily  dextrose 5%. 1000 milliLiter(s) (100 mL/Hr) IV Continuous <Continuous>  dextrose 5%. 1000 milliLiter(s) (50 mL/Hr) IV Continuous <Continuous>  dextrose 50% Injectable 25 Gram(s) IV Push once  dextrose 50% Injectable 12.5 Gram(s) IV Push once  dextrose 50% Injectable 25 Gram(s) IV Push once  ferrous    sulfate 325 milliGRAM(s) Oral daily  glucagon  Injectable 1 milliGRAM(s) IntraMuscular once  heparin   Injectable 5000 Unit(s) SubCutaneous every 8 hours  hydrALAZINE 10 milliGRAM(s) Oral three times a day  insulin glargine Injectable (LANTUS) 30 Unit(s) SubCutaneous at bedtime  insulin lispro (ADMELOG) corrective regimen sliding scale   SubCutaneous three times a day before meals  insulin lispro (ADMELOG) corrective regimen sliding scale   SubCutaneous at bedtime  insulin lispro Injectable (ADMELOG) 14 Unit(s) SubCutaneous three times a day before meals  levalbuterol Inhalation 0.63 milliGRAM(s) Inhalation every 6 hours  lidocaine   4% Patch 1 Patch Transdermal daily  meclizine 12.5 milliGRAM(s) Oral three times a day  metoprolol succinate ER 50 milliGRAM(s) Oral daily  pantoprazole    Tablet 40 milliGRAM(s) Oral before breakfast  predniSONE   Tablet 20 milliGRAM(s) Oral daily    Jamila Marroquin  Nurse Practitioner  Division of Endocrinology & Diabetes  Pager # [phone]      If after 6PM or before 9AM, or on weekends/holidays, please call endocrine answering service for assistance ([phone]).  For nonurgent matters email [email or web address] for assistance.

## 2022-09-06 NOTE — PROGRESS NOTE ADULT - PROBLEM SELECTOR PLAN 10
DVT ppx: heparin Subq q8hrs   Diet: CC Diet
DVT ppx: heparin Subq q8hrs   Diet: CC Diet   Dispo: pending improvement in blood sugars
DVT ppx: heparin Subq q8hrs   Diet: CC Diet
DVT ppx: heparin Subq q8hrs   Diet: CC Diet   Dispo: pending improvement in blood sugars
DVT ppx: heparin Subq q8hrs   Diet: CC Diet
DVT ppx: heparin Subq q8hrs   Diet: CC Diet

## 2022-09-07 ENCOUNTER — TRANSCRIPTION ENCOUNTER (OUTPATIENT)
Age: 72
End: 2022-09-07

## 2022-09-07 VITALS
HEART RATE: 68 BPM | TEMPERATURE: 98 F | DIASTOLIC BLOOD PRESSURE: 64 MMHG | OXYGEN SATURATION: 99 % | SYSTOLIC BLOOD PRESSURE: 152 MMHG | RESPIRATION RATE: 18 BRPM

## 2022-09-07 LAB
ANION GAP SERPL CALC-SCNC: 10 MMOL/L — SIGNIFICANT CHANGE UP (ref 7–14)
BUN SERPL-MCNC: 116 MG/DL — HIGH (ref 7–23)
CALCIUM SERPL-MCNC: 10.2 MG/DL — SIGNIFICANT CHANGE UP (ref 8.4–10.5)
CHLORIDE SERPL-SCNC: 92 MMOL/L — LOW (ref 98–107)
CO2 SERPL-SCNC: 36 MMOL/L — HIGH (ref 22–31)
CREAT SERPL-MCNC: 1.67 MG/DL — HIGH (ref 0.5–1.3)
EGFR: 32 ML/MIN/1.73M2 — LOW
FERRITIN SERPL-MCNC: 239 NG/ML — HIGH (ref 15–150)
FOLATE SERPL-MCNC: 20 NG/ML — HIGH (ref 3.1–17.5)
GLUCOSE BLDC GLUCOMTR-MCNC: 103 MG/DL — HIGH (ref 70–99)
GLUCOSE BLDC GLUCOMTR-MCNC: 156 MG/DL — HIGH (ref 70–99)
GLUCOSE BLDC GLUCOMTR-MCNC: 303 MG/DL — HIGH (ref 70–99)
GLUCOSE SERPL-MCNC: 159 MG/DL — HIGH (ref 70–99)
HCT VFR BLD CALC: 34.2 % — LOW (ref 34.5–45)
HGB BLD-MCNC: 9.8 G/DL — LOW (ref 11.5–15.5)
IRON SATN MFR SERPL: 25 % — SIGNIFICANT CHANGE UP (ref 14–50)
IRON SATN MFR SERPL: 77 UG/DL — SIGNIFICANT CHANGE UP (ref 30–160)
MCHC RBC-ENTMCNC: 28.7 GM/DL — LOW (ref 32–36)
MCHC RBC-ENTMCNC: 29.5 PG — SIGNIFICANT CHANGE UP (ref 27–34)
MCV RBC AUTO: 103 FL — HIGH (ref 80–100)
NRBC # BLD: 0 /100 WBCS — SIGNIFICANT CHANGE UP (ref 0–0)
NRBC # FLD: 0.02 K/UL — HIGH (ref 0–0)
PLATELET # BLD AUTO: 233 K/UL — SIGNIFICANT CHANGE UP (ref 150–400)
POTASSIUM SERPL-MCNC: 4.3 MMOL/L — SIGNIFICANT CHANGE UP (ref 3.5–5.3)
POTASSIUM SERPL-SCNC: 4.3 MMOL/L — SIGNIFICANT CHANGE UP (ref 3.5–5.3)
RBC # BLD: 3.32 M/UL — LOW (ref 3.8–5.2)
RBC # FLD: 14.4 % — SIGNIFICANT CHANGE UP (ref 10.3–14.5)
SODIUM SERPL-SCNC: 138 MMOL/L — SIGNIFICANT CHANGE UP (ref 135–145)
TIBC SERPL-MCNC: 306 UG/DL — SIGNIFICANT CHANGE UP (ref 220–430)
UIBC SERPL-MCNC: 229 UG/DL — SIGNIFICANT CHANGE UP (ref 110–370)
VIT B12 SERPL-MCNC: 816 PG/ML — SIGNIFICANT CHANGE UP (ref 200–900)
WBC # BLD: 9.55 K/UL — SIGNIFICANT CHANGE UP (ref 3.8–10.5)
WBC # FLD AUTO: 9.55 K/UL — SIGNIFICANT CHANGE UP (ref 3.8–10.5)

## 2022-09-07 PROCEDURE — 99232 SBSQ HOSP IP/OBS MODERATE 35: CPT

## 2022-09-07 PROCEDURE — 93010 ELECTROCARDIOGRAM REPORT: CPT

## 2022-09-07 RX ORDER — MECLIZINE HCL 12.5 MG
1 TABLET ORAL
Qty: 90 | Refills: 0
Start: 2022-09-07 | End: 2022-10-06

## 2022-09-07 RX ORDER — INSULIN ASPART 100 [IU]/ML
10 INJECTION, SOLUTION SUBCUTANEOUS
Qty: 900 | Refills: 0
Start: 2022-09-07 | End: 2022-10-06

## 2022-09-07 RX ORDER — INSULIN DETEMIR 100/ML (3)
25 INSULIN PEN (ML) SUBCUTANEOUS
Qty: 100 | Refills: 0
Start: 2022-09-07

## 2022-09-07 RX ORDER — LIDOCAINE 4 G/100G
1 CREAM TOPICAL
Qty: 30 | Refills: 0
Start: 2022-09-07 | End: 2022-10-06

## 2022-09-07 RX ORDER — HYDRALAZINE HCL 50 MG
1 TABLET ORAL
Qty: 90 | Refills: 0
Start: 2022-09-07 | End: 2022-10-06

## 2022-09-07 RX ORDER — BUMETANIDE 0.25 MG/ML
2 INJECTION INTRAMUSCULAR; INTRAVENOUS
Qty: 120 | Refills: 0
Start: 2022-09-07 | End: 2022-10-06

## 2022-09-07 RX ORDER — BUMETANIDE 0.25 MG/ML
2 INJECTION INTRAMUSCULAR; INTRAVENOUS
Refills: 0 | Status: DISCONTINUED | OUTPATIENT
Start: 2022-09-07 | End: 2022-09-07

## 2022-09-07 RX ORDER — INSULIN GLARGINE 100 [IU]/ML
25 INJECTION, SOLUTION SUBCUTANEOUS AT BEDTIME
Refills: 0 | Status: DISCONTINUED | OUTPATIENT
Start: 2022-09-07 | End: 2022-09-07

## 2022-09-07 RX ORDER — INSULIN LISPRO 100/ML
10 VIAL (ML) SUBCUTANEOUS
Refills: 0 | Status: DISCONTINUED | OUTPATIENT
Start: 2022-09-07 | End: 2022-09-07

## 2022-09-07 RX ADMIN — PANTOPRAZOLE SODIUM 40 MILLIGRAM(S): 20 TABLET, DELAYED RELEASE ORAL at 06:37

## 2022-09-07 RX ADMIN — LIDOCAINE 1 PATCH: 4 CREAM TOPICAL at 13:03

## 2022-09-07 RX ADMIN — LEVALBUTEROL 0.63 MILLIGRAM(S): 1.25 SOLUTION, CONCENTRATE RESPIRATORY (INHALATION) at 05:05

## 2022-09-07 RX ADMIN — BUDESONIDE AND FORMOTEROL FUMARATE DIHYDRATE 2 PUFF(S): 160; 4.5 AEROSOL RESPIRATORY (INHALATION) at 09:54

## 2022-09-07 RX ADMIN — Medication 50 MILLIGRAM(S): at 06:36

## 2022-09-07 RX ADMIN — HEPARIN SODIUM 5000 UNIT(S): 5000 INJECTION INTRAVENOUS; SUBCUTANEOUS at 16:57

## 2022-09-07 RX ADMIN — Medication 10 UNIT(S): at 12:10

## 2022-09-07 RX ADMIN — LIDOCAINE 1 PATCH: 4 CREAM TOPICAL at 18:03

## 2022-09-07 RX ADMIN — Medication 10 MILLIGRAM(S): at 06:37

## 2022-09-07 RX ADMIN — BUMETANIDE 2 MILLIGRAM(S): 0.25 INJECTION INTRAMUSCULAR; INTRAVENOUS at 06:36

## 2022-09-07 RX ADMIN — Medication 20 MILLIGRAM(S): at 06:37

## 2022-09-07 RX ADMIN — Medication 2: at 07:59

## 2022-09-07 RX ADMIN — HEPARIN SODIUM 5000 UNIT(S): 5000 INJECTION INTRAVENOUS; SUBCUTANEOUS at 07:09

## 2022-09-07 RX ADMIN — Medication 14 UNIT(S): at 07:59

## 2022-09-07 RX ADMIN — LEVALBUTEROL 0.63 MILLIGRAM(S): 1.25 SOLUTION, CONCENTRATE RESPIRATORY (INHALATION) at 17:02

## 2022-09-07 RX ADMIN — BUMETANIDE 2 MILLIGRAM(S): 0.25 INJECTION INTRAMUSCULAR; INTRAVENOUS at 17:36

## 2022-09-07 RX ADMIN — LEVALBUTEROL 0.63 MILLIGRAM(S): 1.25 SOLUTION, CONCENTRATE RESPIRATORY (INHALATION) at 09:50

## 2022-09-07 RX ADMIN — Medication 10 UNIT(S): at 16:56

## 2022-09-07 RX ADMIN — Medication 10 MILLIGRAM(S): at 13:03

## 2022-09-07 RX ADMIN — HEPARIN SODIUM 5000 UNIT(S): 5000 INJECTION INTRAVENOUS; SUBCUTANEOUS at 00:09

## 2022-09-07 RX ADMIN — Medication 325 MILLIGRAM(S): at 13:03

## 2022-09-07 RX ADMIN — Medication 8: at 16:55

## 2022-09-07 NOTE — PROGRESS NOTE ADULT - ASSESSMENT
73 y/o F with pmhx of COPD on home O2(3L) with severe pHTN, OHS/MIGUEL on home biPAP, HTN, HLD who presents with 5 days of lightheadedness and fatigue found to be in HHS, PE non-focal    Impression: weakness likely in setting of medical illness, low suspicion for central process      supportive care

## 2022-09-07 NOTE — DISCHARGE NOTE NURSING/CASE MANAGEMENT/SOCIAL WORK - NSDCPEFALRISK_GEN_ALL_CORE
For information on Fall & Injury Prevention, visit: [email or web address] OR  [email or web address] OR  https://www.cdc.gov/steadi/patient.html

## 2022-09-07 NOTE — PROGRESS NOTE ADULT - SUBJECTIVE AND OBJECTIVE BOX
PULMONARY PROGRESS NOTE    DAMARI GUERRERO  MRN-[id number]    Patient is a 72y old  Female who presents with a chief complaint of lightheadedness and fatigue for 5 days (07 Sep 2022 10:27)      HPI:   on NC  denies chest tightness    ROS:   -    ACTIVE MEDICATION LIST:  MEDICATIONS  (STANDING):  atorvastatin 20 milliGRAM(s) Oral at bedtime  budesonide 160 MICROgram(s)/formoterol 4.5 MICROgram(s) Inhaler 2 Puff(s) Inhalation two times a day  buMETAnide 2 milliGRAM(s) Oral daily  dextrose 5%. 1000 milliLiter(s) (100 mL/Hr) IV Continuous <Continuous>  dextrose 5%. 1000 milliLiter(s) (50 mL/Hr) IV Continuous <Continuous>  dextrose 50% Injectable 25 Gram(s) IV Push once  dextrose 50% Injectable 12.5 Gram(s) IV Push once  dextrose 50% Injectable 25 Gram(s) IV Push once  ferrous    sulfate 325 milliGRAM(s) Oral daily  glucagon  Injectable 1 milliGRAM(s) IntraMuscular once  heparin   Injectable 5000 Unit(s) SubCutaneous every 8 hours  hydrALAZINE 10 milliGRAM(s) Oral three times a day  insulin glargine Injectable (LANTUS) 30 Unit(s) SubCutaneous at bedtime  insulin lispro (ADMELOG) corrective regimen sliding scale   SubCutaneous three times a day before meals  insulin lispro (ADMELOG) corrective regimen sliding scale   SubCutaneous at bedtime  insulin lispro Injectable (ADMELOG) 14 Unit(s) SubCutaneous three times a day before meals  levalbuterol Inhalation 0.63 milliGRAM(s) Inhalation every 6 hours  lidocaine   4% Patch 1 Patch Transdermal daily  meclizine 12.5 milliGRAM(s) Oral three times a day  metoprolol succinate ER 50 milliGRAM(s) Oral daily  pantoprazole    Tablet 40 milliGRAM(s) Oral before breakfast    MEDICATIONS  (PRN):  acetaminophen     Tablet .. 650 milliGRAM(s) Oral every 6 hours PRN Mild Pain (1 - 3), Moderate Pain (4 - 6), Severe Pain (7 - 10)  ALBUTerol    90 MICROgram(s) HFA Inhaler 2 Puff(s) Inhalation every 6 hours PRN Shortness of Breath and/or Wheezing  dextrose Oral Gel 15 Gram(s) Oral once PRN Blood Glucose LESS THAN 70 milliGRAM(s)/deciliter      EXAM:  Vital Signs Last 24 Hrs  T(C): 36.7 (07 Sep 2022 06:42), Max: 36.7 (07 Sep 2022 06:42)  T(F): 98.1 (07 Sep 2022 06:42), Max: 98.1 (07 Sep 2022 06:42)  HR: 66 (07 Sep 2022 09:54) (59 - 85)  BP: 133/61 (07 Sep 2022 06:42) (122/70 - 145/72)  BP(mean): --  RR: 19 (07 Sep 2022 06:42) (18 - 19)  SpO2: 100% (07 Sep 2022 09:54) (97% - 100%)    Parameters below as of 07 Sep 2022 09:54  Patient On (Oxygen Delivery Method): nasal cannula        GENERAL: The patient is awake and alert in no apparent distress.     LUNGS: Clear to auscultation without wheezing, rales or rhonchi; respirations unlabored                                 9.8    9.55  )-----------( 233      ( 07 Sep 2022 06:29 )             34.2       09-07    138  |  92<L>  |  116<H>  ----------------------------<  159<H>  4.3   |  36<H>  |  1.67<H>    Ca    10.2      07 Sep 2022 06:29  Phos  3.3     09-06  Mg     2.30     09-06   < from: Xray Chest 1 View- PORTABLE-Routine (Xray Chest 1 View- PORTABLE-Routine .) (09.06.22 @ 12:37) >    ACC: [id number] EXAM:  XR CHEST PORTABLE ROUTINE 1V                          PROCEDURE DATE:  09/06/2022          INTERPRETATION:  CLINICAL INFORMATION: CHF follow-up    TIME OF EXAMINATION: September 6 at 12:20 PM    EXAM: Portable chest    FINDINGS:  Cardiomegaly with perihilar haze and prominent kelvin consistent with CHF.   No obvious effusions or pneumothorax. Sternotomy wires and mitral valve   prosthesis. No focal consolidations. Heart is enlarged.        COMPARISON: August 29 without significant change        IMPRESSION: CHF    --- End of Report ---            DELIA PEDERSEN MD; Attending Radiologist  This document has been electronically signed. Sep  6 2022  2:45PM    < end of copied text >  < from: TTE with Doppler (w/Cont) (08.24.22 @ 17:06) >  ------------------------------------------------------------------------  CONCLUSIONS:  1. Mechanical prosthetic mitral valve replacement. Minimal  mitral regurgitation. Mean transmitral valve gradient  equals 6 mm Hg, which is elevated even in the setting ofa  prosthetic valve.  2. Endocardium not well visualized; grossly normal left  ventricular systolic function. Endocardial visualization  enhanced with intravenous injection of echo contrast  (Definity). Septal consistent with right ventricular  overload.  3. Right ventricular enlargement with decreased right  ventricular systolic function.  ------------------------------------------------------------------------  Confirmed on  8/24/2022 - 23:49:32 by SHARON Ferreira  ------------------------------------------------------------------------    < end of copied text >    PROBLEM LIST:  72y Female with HEALTH ISSUES - PROBLEM Dx:  Hyperglycemia due to diabetes mellitus    Chronic diastolic heart failure    MIGUEL treated with BiPAP    COPD, severe    Chronic atrial fibrillation    Need for prophylactic measure    HLD (hyperlipidemia)    Diabetes mellitus, new onset    Hypertension    Acute kidney injury superimposed on CKD    Steroid-induced hyperglycemia              RECS:  s/p prednisone taper  on her inhalers  has avaps at home (confirmed with DME company)  on bpap inpatient  diuresis per renal      Please call with any questions.    Irma Eaton DO  [facility] Pulmonary/Sleep Medicine  [phone]

## 2022-09-07 NOTE — PROGRESS NOTE ADULT - SUBJECTIVE AND OBJECTIVE BOX
Patient seen and examined this am. No new events    MEDICATIONS:    acetaminophen     Tablet .. 650 milliGRAM(s) Oral every 6 hours PRN  ALBUTerol    90 MICROgram(s) HFA Inhaler 2 Puff(s) Inhalation every 6 hours PRN  atorvastatin 20 milliGRAM(s) Oral at bedtime  budesonide 160 MICROgram(s)/formoterol 4.5 MICROgram(s) Inhaler 2 Puff(s) Inhalation two times a day  buMETAnide 2 milliGRAM(s) Oral daily  dextrose 5%. 1000 milliLiter(s) IV Continuous <Continuous>  dextrose 5%. 1000 milliLiter(s) IV Continuous <Continuous>  dextrose 50% Injectable 25 Gram(s) IV Push once  dextrose 50% Injectable 12.5 Gram(s) IV Push once  dextrose 50% Injectable 25 Gram(s) IV Push once  dextrose Oral Gel 15 Gram(s) Oral once PRN  ferrous    sulfate 325 milliGRAM(s) Oral daily  glucagon  Injectable 1 milliGRAM(s) IntraMuscular once  heparin   Injectable 5000 Unit(s) SubCutaneous every 8 hours  hydrALAZINE 10 milliGRAM(s) Oral three times a day  insulin glargine Injectable (LANTUS) 30 Unit(s) SubCutaneous at bedtime  insulin lispro (ADMELOG) corrective regimen sliding scale   SubCutaneous three times a day before meals  insulin lispro (ADMELOG) corrective regimen sliding scale   SubCutaneous at bedtime  insulin lispro Injectable (ADMELOG) 14 Unit(s) SubCutaneous three times a day before meals  levalbuterol Inhalation 0.63 milliGRAM(s) Inhalation every 6 hours  lidocaine   4% Patch 1 Patch Transdermal daily  meclizine 12.5 milliGRAM(s) Oral three times a day  metoprolol succinate ER 50 milliGRAM(s) Oral daily  pantoprazole    Tablet 40 milliGRAM(s) Oral before breakfast      LABS:                          9.8    9.55  )-----------( 233      ( 07 Sep 2022 06:29 )             34.2     09-07    138  |  92<L>  |  116<H>  ----------------------------<  159<H>  4.3   |  36<H>  |  1.67<H>    Ca    10.2      07 Sep 2022 06:29  Phos  3.3     09-06  Mg     2.30     09-06      CAPILLARY BLOOD GLUCOSE      POCT Blood Glucose.: 156 mg/dL (07 Sep 2022 07:44)  POCT Blood Glucose.: 155 mg/dL (06 Sep 2022 22:21)  POCT Blood Glucose.: 218 mg/dL (06 Sep 2022 16:52)  POCT Blood Glucose.: 125 mg/dL (06 Sep 2022 11:30)        I&O's Summary    Vital Signs Last 24 Hrs  T(C): 36.7 (07 Sep 2022 06:42), Max: 36.7 (07 Sep 2022 06:42)  T(F): 98.1 (07 Sep 2022 06:42), Max: 98.1 (07 Sep 2022 06:42)  HR: 66 (07 Sep 2022 09:54) (59 - 85)  BP: 133/61 (07 Sep 2022 06:42) (122/70 - 145/72)  BP(mean): --  RR: 19 (07 Sep 2022 06:42) (18 - 19)  SpO2: 100% (07 Sep 2022 09:54) (97% - 100%)    Parameters below as of 07 Sep 2022 09:54  Patient On (Oxygen Delivery Method): nasal cannula          On Neurological Examination:    Mental Status - Patient is alert, awake, oriented X3. fluent, names, no dysarthria no aphasia Follows commands well and able to answer questions appropriately. Mood and affect  normal    Cranial Nerves - PERRL, EOMI, VFF, V1-V3 intact, no gross facial asymmetry, tongue/uvula midline    Motor Exam -   no drift    Sensory    Intact to light touch and pinprick bilaterally    Coord: FTN intact bilaterally     Gait -  normal      Reflexes:        2+ UEs trace LEs         RADIOLOGY  [facility]

## 2022-09-07 NOTE — PROGRESS NOTE ADULT - REASON FOR ADMISSION
lightheadedness and fatigue for 5 days

## 2022-09-07 NOTE — CHART NOTE - NSCHARTNOTESELECT_GEN_ALL_CORE
Endocrinology/Event Note
Event Note
Pulmonary/Event Note
Endocrinology/Event Note
Event Note

## 2022-09-07 NOTE — DISCHARGE NOTE NURSING/CASE MANAGEMENT/SOCIAL WORK - PATIENT PORTAL LINK FT
You can access the FollowMyHealth Patient Portal offered by [facility] by registering at the following website: [email or web address]. By joining [employer]’s FollowMyHealth portal, you will also be able to view your health information using other applications (apps) compatible with our system.

## 2022-09-07 NOTE — PROGRESS NOTE ADULT - SUBJECTIVE AND OBJECTIVE BOX
[facility] NEPHROLOGY- PROGRESS NOTE    72 year old Female with history of COPD, CHF presents with weakness. Nephrology consulted for elevated Scr.    REVIEW OF SYSTEMS:  Gen: no fevers  Cards: no chest pain  Resp: + dyspnea  GI: no nausea or vomiting, no diarrhea  Vascular: + LE edema    No Known Allergies      Hospital Medications: Medications reviewed      VITALS:  T(F): 98.1 (09-07-22 @ 06:42), Max: 98.1 (09-07-22 @ 06:42)  HR: 66 (09-07-22 @ 09:54)  BP: 133/61 (09-07-22 @ 06:42)  RR: 19 (09-07-22 @ 06:42)  SpO2: 100% (09-07-22 @ 09:54)  Wt(kg): --        PHYSICAL EXAM:    Gen: NAD, calm  Cards: Irregularly irregular, +S1/S2, no M/G/R  Resp: CTA B/L anteriorly  GI: soft, NT/ND, NABS  Vascular: trace LE edema B/L        LABS:  09-07    138  |  92<L>  |  116<H>  ----------------------------<  159<H>  4.3   |  36<H>  |  1.67<H>    Ca    10.2      07 Sep 2022 06:29  Phos  3.3     09-06  Mg     2.30     09-06      Creatinine Trend: 1.67 <--, 1.76 <--, 1.66 <--, 1.86 <--, 1.64 <--, 1.54 <--, 1.69 <--                        9.8    9.55  )-----------( 233      ( 07 Sep 2022 06:29 )             34.2     Urine Studies:          < from: Xray Chest 1 View- PORTABLE-Routine (Xray Chest 1 View- PORTABLE-Routine .) (09.06.22 @ 12:37) >  IMPRESSION: CHF    --- End of Report ---    < end of copied text >

## 2022-09-07 NOTE — PROGRESS NOTE ADULT - ASSESSMENT
72 year old Female with history of COPD, CHF presents with weakness. Nephrology consulted for elevated Scr.    1) MISAEL: Secondary to CRS? MISAEL resolved. UA bland. FeUrea low. Bladder scan not performed. Avoid nephrotoxins.    2) CKD-3b: Baseline Scr 1.4-1.6 with CKD likely due to DM. Outpatient CKD work up. Monitor electrolytes.    3) HTN with CKD: BP controlled. Continue with current medications. Monitor BP.    4) LE edema: With CXR as CHF. Increase bumex to 2 mg PO twice daily. Prior TTE with grossly normal LVSF. Monitor UO.    5) L renal mass: Patient refusing inpatient work up. Outpatient Urology evaluation.    6) Hyperkalemia: In setting of hyperglycemia. S/P lokelma. Continue with low K diet. Monitor serum K.    7) Anemia of renal disease: Hb low with acceptable iron stores. Will give CHACORTA if Hb decreases. Monitor Hb.      No renal objection to discharge and outpatient follow up.      [facility] NEPHROLOGY  Rodrigue Amador M.D.  Rob Perez D.O.  Ana Song M.D.  Lucrecia López, ALLISON, ANP-C    Telephone: [phone]  Facsimile: [phone]    [address] [place] [address]

## 2022-09-07 NOTE — DISCHARGE NOTE NURSING/CASE MANAGEMENT/SOCIAL WORK - NSCORESITESY/N_GEN_A_CORE_RD
"Requested Prescriptions   Pending Prescriptions Disp Refills     amLODIPine (NORVASC) 10 MG tablet [Pharmacy Med Name: AmLODIPine Besylate Oral Tablet 10 MG] 30 tablet 0    Last Written Prescription Date:  1/18/19  Last Fill Quantity: 30,  # refills: 0   Last office visit: 1/15/2019 with prescribing provider:     Future Office Visit:     Sig: TAKE 1 TABLET (10MG) BY MOUTH DAILY.    Calcium Channel Blockers Protocol  Failed - 1/29/2019 11:21 AM       Failed - Normal serum creatinine on file in past 12 months    Recent Labs   Lab Test 12/29/17  1052   CR 0.95            Passed - Blood pressure under 140/90 in past 12 months    BP Readings from Last 3 Encounters:   01/15/19 138/86   12/29/17 130/80   09/21/16 122/75                Passed - Recent (12 mo) or future (30 days) visit within the authorizing provider's specialty    Patient had office visit in the last 12 months or has a visit in the next 30 days with authorizing provider or within the authorizing provider's specialty.  See \"Patient Info\" tab in inbasket, or \"Choose Columns\" in Meds & Orders section of the refill encounter.             Passed - Medication is active on med list       Passed - Patient is age 18 or older        hydrochlorothiazide (HYDRODIURIL) 25 MG tablet [Pharmacy Med Name: HydroCHLOROthiazide Oral Tablet 25 MG] 60 tablet 0    Last Written Prescription Date:  1/18/19  Last Fill Quantity: 60,  # refills: 0   Last office visit: 1/15/2019 with prescribing provider:     Future Office Visit:     Sig: TAKE TWO TABLETS BY MOUTH DAILY    Diuretics (Including Combos) Protocol Failed - 1/29/2019 11:21 AM       Failed - Normal serum creatinine on file in past 12 months    Recent Labs   Lab Test 12/29/17  1052   CR 0.95             Failed - Normal serum potassium on file in past 12 months    Recent Labs   Lab Test 12/29/17  1052   POTASSIUM 3.6                   Failed - Normal serum sodium on file in past 12 months    Recent Labs   Lab Test " "12/29/17  1052                Passed - Blood pressure under 140/90 in past 12 months    BP Readings from Last 3 Encounters:   01/15/19 138/86   12/29/17 130/80   09/21/16 122/75                Passed - Recent (12 mo) or future (30 days) visit within the authorizing provider's specialty    Patient had office visit in the last 12 months or has a visit in the next 30 days with authorizing provider or within the authorizing provider's specialty.  See \"Patient Info\" tab in inbasket, or \"Choose Columns\" in Meds & Orders section of the refill encounter.             Passed - Medication is active on med list       Passed - Patient is age 18 or older          " No

## 2022-09-07 NOTE — CHART NOTE - NSCHARTNOTEFT_GEN_A_CORE
tele strip showed possible afib confirmed by EKG. spoke to Dr. Merida cardiologist- has h/o PAfib not on ac due to h/o GIB. will follow her as out pt. can be dc home today.

## 2022-09-07 NOTE — PROGRESS NOTE ADULT - PROVIDER SPECIALTY LIST ADULT
Cardiology
Nephrology
Neurology
Pulmonology
Endocrinology
Endocrinology
Nephrology
Nephrology
Neurology
Pulmonology
Endocrinology
Internal Medicine
Nephrology
Pulmonology
Endocrinology
Internal Medicine
Internal Medicine
Nephrology
Endocrinology
Endocrinology
Cardiology
Internal Medicine
Endocrinology
Cardiology
Cardiology
Endocrinology
Internal Medicine
Cardiology
Internal Medicine

## 2022-09-07 NOTE — PROGRESS NOTE ADULT - SUBJECTIVE AND OBJECTIVE BOX
PULMONARY PROGRESS NOTE    DAMARI GUERRERO  MRN-[id number]    Patient is a 72y old  Female who presents with a chief complaint of lightheadedness and fatigue for 5 days (07 Sep 2022 10:25)      HPI:  -  -    ROS:   -    ACTIVE MEDICATION LIST:  MEDICATIONS  (STANDING):  atorvastatin 20 milliGRAM(s) Oral at bedtime  budesonide 160 MICROgram(s)/formoterol 4.5 MICROgram(s) Inhaler 2 Puff(s) Inhalation two times a day  buMETAnide 2 milliGRAM(s) Oral daily  dextrose 5%. 1000 milliLiter(s) (100 mL/Hr) IV Continuous <Continuous>  dextrose 5%. 1000 milliLiter(s) (50 mL/Hr) IV Continuous <Continuous>  dextrose 50% Injectable 25 Gram(s) IV Push once  dextrose 50% Injectable 12.5 Gram(s) IV Push once  dextrose 50% Injectable 25 Gram(s) IV Push once  ferrous    sulfate 325 milliGRAM(s) Oral daily  glucagon  Injectable 1 milliGRAM(s) IntraMuscular once  heparin   Injectable 5000 Unit(s) SubCutaneous every 8 hours  hydrALAZINE 10 milliGRAM(s) Oral three times a day  insulin glargine Injectable (LANTUS) 30 Unit(s) SubCutaneous at bedtime  insulin lispro (ADMELOG) corrective regimen sliding scale   SubCutaneous three times a day before meals  insulin lispro (ADMELOG) corrective regimen sliding scale   SubCutaneous at bedtime  insulin lispro Injectable (ADMELOG) 14 Unit(s) SubCutaneous three times a day before meals  levalbuterol Inhalation 0.63 milliGRAM(s) Inhalation every 6 hours  lidocaine   4% Patch 1 Patch Transdermal daily  meclizine 12.5 milliGRAM(s) Oral three times a day  metoprolol succinate ER 50 milliGRAM(s) Oral daily  pantoprazole    Tablet 40 milliGRAM(s) Oral before breakfast    MEDICATIONS  (PRN):  acetaminophen     Tablet .. 650 milliGRAM(s) Oral every 6 hours PRN Mild Pain (1 - 3), Moderate Pain (4 - 6), Severe Pain (7 - 10)  ALBUTerol    90 MICROgram(s) HFA Inhaler 2 Puff(s) Inhalation every 6 hours PRN Shortness of Breath and/or Wheezing  dextrose Oral Gel 15 Gram(s) Oral once PRN Blood Glucose LESS THAN 70 milliGRAM(s)/deciliter      EXAM:  Vital Signs Last 24 Hrs  T(C): 36.7 (07 Sep 2022 06:42), Max: 36.7 (07 Sep 2022 06:42)  T(F): 98.1 (07 Sep 2022 06:42), Max: 98.1 (07 Sep 2022 06:42)  HR: 66 (07 Sep 2022 09:54) (59 - 85)  BP: 133/61 (07 Sep 2022 06:42) (122/70 - 145/72)  BP(mean): --  RR: 19 (07 Sep 2022 06:42) (18 - 19)  SpO2: 100% (07 Sep 2022 09:54) (97% - 100%)    Parameters below as of 07 Sep 2022 09:54  Patient On (Oxygen Delivery Method): nasal cannula        GENERAL: The patient is awake and alert in no apparent distress.     LUNGS: Clear to auscultation without wheezing, rales or rhonchi; respirations unlabored    HEART: Regular rate and rhythm without murmur.                            9.8    9.55  )-----------( 233      ( 07 Sep 2022 06:29 )             34.2       09-07    138  |  92<L>  |  116<H>  ----------------------------<  159<H>  4.3   |  36<H>  |  1.67<H>    Ca    10.2      07 Sep 2022 06:29  Phos  3.3     09-06  Mg     2.30     09-06      >>> <<<    PROBLEM LIST:  72y Female with HEALTH ISSUES - PROBLEM Dx:  Hyperglycemia due to diabetes mellitus    Chronic diastolic heart failure    MIGUEL treated with BiPAP    COPD, severe    Chronic atrial fibrillation    Need for prophylactic measure    HLD (hyperlipidemia)    Diabetes mellitus, new onset    Hypertension    Acute kidney injury superimposed on CKD    Steroid-induced hyperglycemia              RECS:        Please call with any questions.    Irma Eaton DO  [facility] Pulmonary/Sleep Medicine  [phone]

## 2022-09-07 NOTE — CHART NOTE - NSCHARTNOTEFT_GEN_A_CORE
Contacted by primary team for discharge recs.  Patient took last dose of prednisone 20 mg today  Reduced Lantus to 25 units qhs and Admelog to 10 units sq tid ac.          CAPILLARY BLOOD GLUCOSE  POCT Blood Glucose.: 103 mg/dL (07 Sep 2022 11:27)  POCT Blood Glucose.: 156 mg/dL (07 Sep 2022 07:44)  POCT Blood Glucose.: 155 mg/dL (06 Sep 2022 22:21)  POCT Blood Glucose.: 218 mg/dL (06 Sep 2022 16:52)    A1C with Estimated Average Glucose (08.23.22 @ 23:18)    A1C with Estimated Average Glucose Result: 10.7%    Estimated Average Glucose: 260      DISCHARGE PLAN:   is also on levemir and novolog at home and will help patient with insulin pen injections  Recommend Levemir 25 uits sq qhs and Admelog 10 units tid ac- hold if skips meal  Patient should check FS 3-4x daily, including AM fasting and before meals  If BG< 90 or > 250 mg/dl patient should call PCP  Patient can follow up with endocrinology if she chooses to  Can be seen at [facility] endocrinoogy faculty practice.  Phone number is [phone]        Jamila Marroquin  Nurse Practitioner  Division of Endocrinology & Diabetes  Pager # [phone]      If after 6PM or before 9AM, or on weekends/holidays, please call endocrine answering service for assistance ([phone]).  For nonurgent matters email [email or web address] for assistance.

## 2022-09-07 NOTE — DISCHARGE NOTE NURSING/CASE MANAGEMENT/SOCIAL WORK - NSDCFUADDAPPT_GEN_ALL_CORE_FT
Patient should check FS 3-4x daily, including AM fasting and before meals  If BG< 90 or > 250 mg/dl patient should call PCP  Patient can follow up with endocrinology if she chooses to  Can be seen at [facility] endocrinoogy faculty practice.  Phone number is [phone]

## 2022-09-08 ENCOUNTER — FORM ENCOUNTER (OUTPATIENT)
Age: 72
End: 2022-09-08

## 2022-09-09 ENCOUNTER — APPOINTMENT (OUTPATIENT)
Dept: ENDOCRINOLOGY | Facility: CLINIC | Age: 72
End: 2022-09-09

## 2022-09-09 RX ORDER — METOPROLOL TARTRATE 50 MG
1 TABLET ORAL
Qty: 30 | Refills: 0
Start: 2022-09-09 | End: 2022-10-08

## 2022-09-09 RX ORDER — INSULIN DETEMIR 100/ML (3)
25 INSULIN PEN (ML) SUBCUTANEOUS
Qty: 1 | Refills: 0
Start: 2022-09-09 | End: 2022-10-08

## 2022-09-09 RX ORDER — PANTOPRAZOLE SODIUM 20 MG/1
1 TABLET, DELAYED RELEASE ORAL
Qty: 30 | Refills: 0
Start: 2022-09-09 | End: 2022-10-08

## 2022-09-09 RX ORDER — MECLIZINE HCL 12.5 MG
1 TABLET ORAL
Qty: 90 | Refills: 0
Start: 2022-09-09 | End: 2022-10-08

## 2022-09-12 NOTE — DIETITIAN INITIAL EVALUATION ADULT. - PROBLEM/PLAN-6
68 year old woman with HTN, DM2, past surgical history of  x 2, hysterectomy, and laparoscopic cholecystectomy (), admitted for large bowel obstruction, had spontaneous return of bowel function before surgical exploration.     # Large bowel obstruction   - continue to monitor serial abdominal exams   - possible OR on 22  - rest of care per primary team     # Pre-operative evaluation   Defer evaluation of eli-operative cardiac risk to cardiology consult   pending TTE    #HTN   Agree with cardiology recs to continue labetalol 200mg BID eli-operatively.   If SBP >180 mmHg persistently, [ ] Get bladder scan [ ] if pain well-controlled, and bladder scan shows no urinary retention, can use labetalol 10mg IV push if HR >60 bpm or hydralazine IV 5mg    # Type 2 Diabetes complicated by hyperglycemia   Takes metformin at home.   Do insulin sliding scale while inpatient.   resumed metformin on discharge.     # COVID 19 infection (present on admission)   Incidental finding of COVID 19 infection on admission nasal swab. Without any dyspnea, or sore throat.   No O2 requirement, does not meet criteria for remdesivir/dexamethasone.   Recommend enoxaparin for DVT ppx     \DVT ppx - lovenox  DISPLAY PLAN FREE TEXT

## 2022-09-15 NOTE — PATIENT PROFILE ADULT - NSPROPOAURINARYCATHETER_GEN_A_NUR
Bin Mai Patient Age: 21 year old  MESSAGE: Interpreting service used: No    Insurance on file confirmed with caller: Yes    Orthopaedics/Podiatry/Sports Medicine- Reason for call: -  Venu from [employer] calling asking if patient has a specific work restrictions or limitations, if he was prescribed any medication and treatment plan. Reference claim # [id number], call back number [phone]. Routed to providers clinical pool.   Message read back to caller for accuracy: Yes       ALLERGIES:  Patient has no allergy information on record.  Current Outpatient Medications   Medication Sig Dispense Refill   • acetaminophen (TYLENOL) 500 MG tablet Every 8 Hours as needed for Pain     • oxyCODONE, IMM REL, (ROXICODONE) 5 MG immediate release tablet Every 6 Hours as needed for Pain Severe (Level 7 - 10)     • meloxicam (MOBIC) 15 MG tablet Take 1 tablet by mouth daily for 10 days. 10 tablet 0     No current facility-administered medications for this visit.     PHARMACY to use:           Pharmacy preference(s) on file:   [id number] - [place], [place] - [id number] [facility] [address]  Phone: [phone] Fax: [phone]      CALL BACK INFO: Ok to leave response (including medical information) on answering machine      PCP: Verify Pcp         INS: Payor: [employer] [place] / Plan: PPO [id number] / Product Type: PPO MISC   PATIENT ADDRESS:  [address] [place] [id number]     no

## 2022-10-22 ENCOUNTER — EMERGENCY (EMERGENCY)
Facility: HOSPITAL | Age: 72
LOS: 0 days | Discharge: ANOTHER HOSPITAL | End: 2022-10-22
Attending: STUDENT IN AN ORGANIZED HEALTH CARE EDUCATION/TRAINING PROGRAM
Payer: MEDICARE

## 2022-10-22 ENCOUNTER — INPATIENT (INPATIENT)
Facility: HOSPITAL | Age: 72
LOS: 24 days | Discharge: HOME HEALTH | DRG: 308 | End: 2022-11-16
Attending: INTERNAL MEDICINE | Admitting: STUDENT IN AN ORGANIZED HEALTH CARE EDUCATION/TRAINING PROGRAM
Payer: MEDICARE

## 2022-10-22 VITALS
SYSTOLIC BLOOD PRESSURE: 139 MMHG | HEART RATE: 153 BPM | HEIGHT: 64 IN | TEMPERATURE: 97 F | RESPIRATION RATE: 26 BRPM | DIASTOLIC BLOOD PRESSURE: 98 MMHG | WEIGHT: 240.08 LBS

## 2022-10-22 VITALS
OXYGEN SATURATION: 100 % | SYSTOLIC BLOOD PRESSURE: 123 MMHG | RESPIRATION RATE: 20 BRPM | DIASTOLIC BLOOD PRESSURE: 81 MMHG | TEMPERATURE: 98 F | HEART RATE: 102 BPM

## 2022-10-22 VITALS — HEIGHT: 64 IN

## 2022-10-22 DIAGNOSIS — Z95.4 PRESENCE OF OTHER HEART-VALVE REPLACEMENT: Chronic | ICD-10-CM

## 2022-10-22 DIAGNOSIS — Z20.822 CONTACT WITH AND (SUSPECTED) EXPOSURE TO COVID-19: ICD-10-CM

## 2022-10-22 DIAGNOSIS — R19.7 DIARRHEA, UNSPECIFIED: ICD-10-CM

## 2022-10-22 DIAGNOSIS — R11.10 VOMITING, UNSPECIFIED: ICD-10-CM

## 2022-10-22 DIAGNOSIS — J44.9 CHRONIC OBSTRUCTIVE PULMONARY DISEASE, UNSPECIFIED: ICD-10-CM

## 2022-10-22 DIAGNOSIS — I10 ESSENTIAL (PRIMARY) HYPERTENSION: ICD-10-CM

## 2022-10-22 DIAGNOSIS — G47.33 OBSTRUCTIVE SLEEP APNEA (ADULT) (PEDIATRIC): ICD-10-CM

## 2022-10-22 DIAGNOSIS — R53.1 WEAKNESS: ICD-10-CM

## 2022-10-22 DIAGNOSIS — I47.20 VENTRICULAR TACHYCARDIA, UNSPECIFIED: ICD-10-CM

## 2022-10-22 DIAGNOSIS — E78.5 HYPERLIPIDEMIA, UNSPECIFIED: ICD-10-CM

## 2022-10-22 DIAGNOSIS — Z99.81 DEPENDENCE ON SUPPLEMENTAL OXYGEN: ICD-10-CM

## 2022-10-22 DIAGNOSIS — I27.20 PULMONARY HYPERTENSION, UNSPECIFIED: ICD-10-CM

## 2022-10-22 DIAGNOSIS — Z79.4 LONG TERM (CURRENT) USE OF INSULIN: ICD-10-CM

## 2022-10-22 LAB
ALBUMIN SERPL ELPH-MCNC: 3.6 G/DL — SIGNIFICANT CHANGE UP (ref 3.3–5)
ALP SERPL-CCNC: 113 U/L — SIGNIFICANT CHANGE UP (ref 40–120)
ALT FLD-CCNC: 28 U/L — SIGNIFICANT CHANGE UP (ref 12–78)
ANION GAP SERPL CALC-SCNC: 7 MMOL/L — SIGNIFICANT CHANGE UP (ref 5–17)
AST SERPL-CCNC: 37 U/L — SIGNIFICANT CHANGE UP (ref 15–37)
BASE EXCESS BLDV CALC-SCNC: 11.4 MMOL/L — HIGH (ref -2–3)
BASOPHILS # BLD AUTO: 0.04 K/UL — SIGNIFICANT CHANGE UP (ref 0–0.2)
BASOPHILS NFR BLD AUTO: 0.5 % — SIGNIFICANT CHANGE UP (ref 0–2)
BILIRUB SERPL-MCNC: 0.6 MG/DL — SIGNIFICANT CHANGE UP (ref 0.2–1.2)
BLOOD GAS COMMENTS, VENOUS: SIGNIFICANT CHANGE UP
BUN SERPL-MCNC: 102 MG/DL — HIGH (ref 7–23)
CALCIUM SERPL-MCNC: 10.3 MG/DL — HIGH (ref 8.5–10.1)
CHLORIDE BLDV-SCNC: 92 MMOL/L — LOW (ref 98–107)
CHLORIDE SERPL-SCNC: 91 MMOL/L — LOW (ref 96–108)
CO2 BLDV-SCNC: 42 MMOL/L — HIGH (ref 22–26)
CO2 SERPL-SCNC: 35 MMOL/L — HIGH (ref 22–31)
CREAT SERPL-MCNC: 2.87 MG/DL — HIGH (ref 0.5–1.3)
EGFR: 17 ML/MIN/1.73M2 — LOW
EOSINOPHIL # BLD AUTO: 0.03 K/UL — SIGNIFICANT CHANGE UP (ref 0–0.5)
EOSINOPHIL NFR BLD AUTO: 0.4 % — SIGNIFICANT CHANGE UP (ref 0–6)
FLUAV AG NPH QL: SIGNIFICANT CHANGE UP
FLUBV AG NPH QL: SIGNIFICANT CHANGE UP
GAS PNL BLDV: 128 MMOL/L — LOW (ref 136–145)
GAS PNL BLDV: SIGNIFICANT CHANGE UP
GAS PNL BLDV: SIGNIFICANT CHANGE UP
GLUCOSE BLDC GLUCOMTR-MCNC: 141 MG/DL — HIGH (ref 70–99)
GLUCOSE BLDV-MCNC: 143 MG/DL — HIGH (ref 65–95)
GLUCOSE SERPL-MCNC: 126 MG/DL — HIGH (ref 70–99)
HCO3 BLDV-SCNC: 40 MMOL/L — HIGH (ref 22–28)
HCT VFR BLD CALC: 34.7 % — SIGNIFICANT CHANGE UP (ref 34.5–45)
HCT VFR BLDA CALC: 35 % — LOW (ref 37–47)
HGB BLD CALC-MCNC: 11.8 G/DL — SIGNIFICANT CHANGE UP (ref 11.7–16.1)
HGB BLD-MCNC: 10.1 G/DL — LOW (ref 11.5–15.5)
HOROWITZ INDEX BLDV+IHG-RTO: 42 — SIGNIFICANT CHANGE UP
IMM GRANULOCYTES NFR BLD AUTO: 0.9 % — SIGNIFICANT CHANGE UP (ref 0–0.9)
LACTATE BLDV-MCNC: 1.2 MMOL/L — SIGNIFICANT CHANGE UP (ref 0.56–1.39)
LACTATE SERPL-SCNC: 0.9 MMOL/L — SIGNIFICANT CHANGE UP (ref 0.7–2)
LYMPHOCYTES # BLD AUTO: 1.25 K/UL — SIGNIFICANT CHANGE UP (ref 1–3.3)
LYMPHOCYTES # BLD AUTO: 16.6 % — SIGNIFICANT CHANGE UP (ref 13–44)
MCHC RBC-ENTMCNC: 28.7 PG — SIGNIFICANT CHANGE UP (ref 27–34)
MCHC RBC-ENTMCNC: 29.1 G/DL — LOW (ref 32–36)
MCV RBC AUTO: 98.6 FL — SIGNIFICANT CHANGE UP (ref 80–100)
MONOCYTES # BLD AUTO: 0.77 K/UL — SIGNIFICANT CHANGE UP (ref 0–0.9)
MONOCYTES NFR BLD AUTO: 10.3 % — SIGNIFICANT CHANGE UP (ref 2–14)
NEUTROPHILS # BLD AUTO: 5.35 K/UL — SIGNIFICANT CHANGE UP (ref 1.8–7.4)
NEUTROPHILS NFR BLD AUTO: 71.3 % — SIGNIFICANT CHANGE UP (ref 43–77)
NRBC # BLD: 0 /100 WBCS — SIGNIFICANT CHANGE UP (ref 0–0)
NT-PROBNP SERPL-SCNC: HIGH PG/ML (ref 0–125)
PCO2 BLDV: 72 MMHG — HIGH (ref 42–55)
PH BLDV: 7.35 — SIGNIFICANT CHANGE UP (ref 7.32–7.43)
PLATELET # BLD AUTO: 330 K/UL — SIGNIFICANT CHANGE UP (ref 150–400)
PO2 BLDV: 30 MMHG — SIGNIFICANT CHANGE UP (ref 25–45)
POTASSIUM BLDV-SCNC: 5.2 MMOL/L — HIGH (ref 3.5–5.1)
POTASSIUM SERPL-MCNC: 4.9 MMOL/L — SIGNIFICANT CHANGE UP (ref 3.5–5.3)
POTASSIUM SERPL-SCNC: 4.9 MMOL/L — SIGNIFICANT CHANGE UP (ref 3.5–5.3)
PROT SERPL-MCNC: 8.6 GM/DL — HIGH (ref 6–8.3)
RBC # BLD: 3.52 M/UL — LOW (ref 3.8–5.2)
RBC # FLD: 15.3 % — HIGH (ref 10.3–14.5)
SAO2 % BLDV: 47.1 % — LOW (ref 94–98)
SARS-COV-2 RNA SPEC QL NAA+PROBE: SIGNIFICANT CHANGE UP
SODIUM SERPL-SCNC: 133 MMOL/L — LOW (ref 135–145)
TROPONIN I, HIGH SENSITIVITY RESULT: 61.4 NG/L — HIGH
WBC # BLD: 7.51 K/UL — SIGNIFICANT CHANGE UP (ref 3.8–10.5)
WBC # FLD AUTO: 7.51 K/UL — SIGNIFICANT CHANGE UP (ref 3.8–10.5)

## 2022-10-22 PROCEDURE — 93010 ELECTROCARDIOGRAM REPORT: CPT

## 2022-10-22 PROCEDURE — 93010 ELECTROCARDIOGRAM REPORT: CPT | Mod: 76

## 2022-10-22 PROCEDURE — 71045 X-RAY EXAM CHEST 1 VIEW: CPT | Mod: 26

## 2022-10-22 PROCEDURE — 99291 CRITICAL CARE FIRST HOUR: CPT

## 2022-10-22 PROCEDURE — 74176 CT ABD & PELVIS W/O CONTRAST: CPT | Mod: 26,MA

## 2022-10-22 RX ORDER — FUROSEMIDE 40 MG
40 TABLET ORAL ONCE
Refills: 0 | Status: COMPLETED | OUTPATIENT
Start: 2022-10-22 | End: 2022-10-22

## 2022-10-22 RX ORDER — PROCAINAMIDE HCL 500 MG
1000 TABLET, EXTENDED RELEASE ORAL ONCE
Refills: 0 | Status: DISCONTINUED | OUTPATIENT
Start: 2022-10-22 | End: 2022-10-22

## 2022-10-22 RX ORDER — SODIUM CHLORIDE 9 MG/ML
1000 INJECTION INTRAMUSCULAR; INTRAVENOUS; SUBCUTANEOUS ONCE
Refills: 0 | Status: DISCONTINUED | OUTPATIENT
Start: 2022-10-22 | End: 2022-10-22

## 2022-10-22 RX ORDER — AMIODARONE HYDROCHLORIDE 400 MG/1
150 TABLET ORAL ONCE
Refills: 0 | Status: COMPLETED | OUTPATIENT
Start: 2022-10-22 | End: 2022-10-22

## 2022-10-22 RX ORDER — AMIODARONE HYDROCHLORIDE 400 MG/1
0.5 TABLET ORAL
Qty: 900 | Refills: 0 | Status: DISCONTINUED | OUTPATIENT
Start: 2022-10-23 | End: 2022-10-22

## 2022-10-22 RX ORDER — ADENOSINE 3 MG/ML
6 INJECTION INTRAVENOUS ONCE
Refills: 0 | Status: DISCONTINUED | OUTPATIENT
Start: 2022-10-22 | End: 2022-10-22

## 2022-10-22 RX ORDER — AMIODARONE HYDROCHLORIDE 400 MG/1
1 TABLET ORAL
Qty: 900 | Refills: 0 | Status: DISCONTINUED | OUTPATIENT
Start: 2022-10-22 | End: 2022-10-22

## 2022-10-22 RX ORDER — ONDANSETRON 8 MG/1
4 TABLET, FILM COATED ORAL ONCE
Refills: 0 | Status: COMPLETED | OUTPATIENT
Start: 2022-10-22 | End: 2022-10-22

## 2022-10-22 RX ADMIN — AMIODARONE HYDROCHLORIDE 618 MILLIGRAM(S): 400 TABLET ORAL at 20:58

## 2022-10-22 RX ADMIN — AMIODARONE HYDROCHLORIDE 150 MILLIGRAM(S): 400 TABLET ORAL at 21:23

## 2022-10-22 RX ADMIN — AMIODARONE HYDROCHLORIDE 33.3 MG/MIN: 400 TABLET ORAL at 21:21

## 2022-10-22 RX ADMIN — ONDANSETRON 4 MILLIGRAM(S): 8 TABLET, FILM COATED ORAL at 20:30

## 2022-10-22 RX ADMIN — Medication 40 MILLIGRAM(S): at 21:25

## 2022-10-22 NOTE — ED PROVIDER NOTE - IV ALTEPLASE EXCL REL HIDDEN
SUBJECTIVE / OVERNIGHT EVENTS: pt seen and examined    MEDICATIONS  (STANDING):  dextrose 5%. 1000 milliLiter(s) (50 mL/Hr) IV Continuous <Continuous>  dextrose 50% Injectable 12.5 Gram(s) IV Push once  dextrose 50% Injectable 25 Gram(s) IV Push once  dextrose 50% Injectable 25 Gram(s) IV Push once  ferrous    sulfate 325 milliGRAM(s) Oral daily  folic acid 1 milliGRAM(s) Oral daily  insulin glargine Injectable (LANTUS) 28 Unit(s) SubCutaneous at bedtime  insulin lispro (HumaLOG) corrective regimen sliding scale   SubCutaneous three times a day before meals  insulin lispro (HumaLOG) corrective regimen sliding scale   SubCutaneous at bedtime  insulin lispro Injectable (HumaLOG) 8 Unit(s) SubCutaneous three times a day before meals  lactulose Syrup 20 Gram(s) Oral three times a day  midodrine. 20 milliGRAM(s) Oral three times a day  pantoprazole    Tablet 40 milliGRAM(s) Oral two times a day  pantoprazole Infusion 8 mG/Hr (10 mL/Hr) IV Continuous <Continuous>  rifAXIMin 550 milliGRAM(s) Oral two times a day  tamsulosin 0.4 milliGRAM(s) Oral at bedtime    MEDICATIONS  (PRN):  dextrose 40% Gel 15 Gram(s) Oral once PRN Blood Glucose LESS THAN 70 milliGRAM(s)/deciliter  glucagon  Injectable 1 milliGRAM(s) IntraMuscular once PRN Glucose LESS THAN 70 milligrams/deciliter    Vital Signs Last 24 Hrs  T(C): 36.7 (01 Jan 2020 04:49), Max: 36.8 (31 Dec 2019 19:56)  T(F): 98.1 (01 Jan 2020 04:49), Max: 98.3 (31 Dec 2019 19:56)  HR: 90 (01 Jan 2020 04:49) (88 - 93)  BP: 111/62 (01 Jan 2020 04:49) (102/56 - 111/62)  BP(mean): --  RR: 18 (01 Jan 2020 04:49) (18 - 18)  SpO2: 97% (01 Jan 2020 04:49) (96% - 97%)    Constitutional: No fever, fatigue  Skin: No rash.  Eyes: No recent vision problems or eye pain.  ENT: No congestion, ear pain, or sore throat.  Cardiovascular: No chest pain or palpation.  Respiratory: No cough, shortness of breath, congestion, or wheezing.  Gastrointestinal: No abdominal pain, nausea, vomiting, or diarrhea.  Genitourinary: No dysuria.  Musculoskeletal: No joint swelling.  Neurologic: No headache.    PHYSICAL EXAM:  GENERAL: NAD  EYES: EOMI, PERRLA  NECK: Supple, No JVD  CHEST/LUNG: dec breath sounds rt base  HEART:  S1 , S2 +  ABDOMEN: soft , bs+, mild distension+  EXTREMITIES: left > rt edema+  NEUROLOGY: alert awake oriented x place, person    LABS:  01-01    135  |  102  |  17  ----------------------------<  175<H>  4.0   |  23  |  1.11    Ca    8.6      01 Jan 2020 07:22    TPro  5.1<L>  /  Alb  2.5<L>  /  TBili  8.7<H>  /  DBili      /  AST  29  /  ALT  17  /  AlkPhos  180<H>  01-01    Creatinine Trend: 1.11 <--, 1.04 <--, 1.12 <--, 1.26 <--, 1.23 <--, 1.17 <--, 1.18 <--                        7.1    5.10  )-----------( 73       ( 01 Jan 2020 09:54 )             21.6     Urine Studies:            LIVER FUNCTIONS - ( 01 Jan 2020 07:22 )  Alb: 2.5 g/dL / Pro: 5.1 g/dL / ALK PHOS: 180 U/L / ALT: 17 U/L / AST: 29 U/L / GGT: x           PT/INR - ( 01 Jan 2020 10:02 )   PT: 23.7 sec;   INR: 2.02 ratio         PTT - ( 31 Dec 2019 09:17 )  PTT:42.1 sec            LIVER FUNCTIONS - ( 31 Dec 2019 06:35 )  Alb: 2.5 g/dL / Pro: 4.9 g/dL / ALK PHOS: 118 U/L / ALT: 18 U/L / AST: 29 U/L / GGT: x           PT/INR - ( 31 Dec 2019 09:17 )   PT: 24.9 sec;   INR: 2.15 ratio         PTT - ( 31 Dec 2019 09:17 )  PTT:42.1 sec            LIVER FUNCTIONS - ( 30 Dec 2019 07:11 )  Alb: 2.5 g/dL / Pro: 5.0 g/dL / ALK PHOS: 123 U/L / ALT: 17 U/L / AST: 29 U/L / GGT: x           PT/INR - ( 30 Dec 2019 08:37 )   PT: 24.9 sec;   INR: 2.13 ratio         PTT - ( 30 Dec 2019 08:37 )  PTT:40.8 sec    LIVER FUNCTIONS - ( 29 Dec 2019 07:08 )  Alb: 2.3 g/dL / Pro: 4.9 g/dL / ALK PHOS: 137 U/L / ALT: 18 U/L / AST: 30 U/L / GGT: x           PT/INR - ( 29 Dec 2019 07:08 )   PT: 24.9 sec;   INR: 2.12 ratio           RADIOLOGY & ADDITIONAL TESTS:    Imaging Personally Reviewed:    Consultant(s) Notes Reviewed:      Care Discussed with Consultants/Other Providers: show

## 2022-10-22 NOTE — ED PROVIDER NOTE - OBJECTIVE STATEMENT
72F h/o COPD on O2 (3L) with severe pHTN, OHS/MIGUEL on home BiPAP (refuses to use), HTN, HLD p/w vomiting. Patient states she has been vomiting over the past few days and having difficulty breathing. She denies abdominal pain. No fever at home. No active chest pain. Reports having diarrhea and feeling generalized weakness. Patient is poor historian, history limited.

## 2022-10-22 NOTE — ED PROVIDER NOTE - NS ED ROS FT
CONST: no fevers, no chills  EYES: no pain, no vision changes  ENT: no sore throat, no ear pain, no change in hearing  CV: no chest pain, no leg swelling  RESP: + shortness of breath, no cough  ABD: no abdominal pain, + nausea, + vomiting, + diarrhea  : no dysuria, no flank pain, no hematuria  MSK: no back pain, no extremity pain  NEURO: no headache or additional neurologic complaints  HEME: no easy bleeding  SKIN:  no rash

## 2022-10-22 NOTE — ED PROVIDER NOTE - CLINICAL SUMMARY MEDICAL DECISION MAKING FREE TEXT BOX
73 y/o F presenting to the ED with new onset wide complex tachycardia. She is tachycardic to the 150s upon arrival. BP stable Requiring increased O2 requirements. Will check labs and CXR. Concern for vtach vs svt with aberrency. will dose amiodarone and discuss with cardiology.

## 2022-10-22 NOTE — ED ADULT TRIAGE NOTE - CHIEF COMPLAINT QUOTE
oxygen dependent c/o no feeling well all week and vomiting all week, c/o right hip pain radiated to right leg.

## 2022-10-22 NOTE — ED PROVIDER NOTE - PHYSICAL EXAMINATION
GENERAL: lethargic but responsive, obese, chronically ill appearing  HEENT: NC/AT, moist mucous membranes  LUNGS: diminished breath sounds bilaterally, no focal wheeze  CARDIAC: tachycardic, regular rhythm, no significant murmur  ABDOMEN: Soft, non tender, non distended, no rebound, no guarding  EXT: 1+ edema, no calf tenderness, 2+ DP pulses bilaterally, no deformities.  NEURO: A&Ox3. Moving all extremities.  SKIN: Warm and dry. No rash.  PSYCH: Normal affect.

## 2022-10-22 NOTE — ED PROVIDER NOTE - PROGRESS NOTE DETAILS
Discussed with Dr. Lubin, recommends EP consult at [facility] as no EP services available here. Rate not controlled despite amio gtt.

## 2022-10-22 NOTE — ED ADULT NURSE NOTE - OBJECTIVE STATEMENT
patient alert and oriented x4. pt c/o vomiting for the past week. pt states she has multiple times per day and some episodes of diarrhea. pt also c/o chest pain and SOB. dr. bernal at bedside to evaluate. pt w/ hx of COPD, on NC 4L from home. pt noted to be labored, , sat 100% on NC. pt placed on cardiac monitor. Zoll pads in place. dr. ny at bedside to evaluate. denies fever. NKDA.

## 2022-10-23 ENCOUNTER — TRANSCRIPTION ENCOUNTER (OUTPATIENT)
Age: 72
End: 2022-10-23

## 2022-10-23 DIAGNOSIS — I48.92 UNSPECIFIED ATRIAL FLUTTER: ICD-10-CM

## 2022-10-23 LAB
ALBUMIN SERPL ELPH-MCNC: 3.7 G/DL — SIGNIFICANT CHANGE UP (ref 3.3–5)
ALBUMIN SERPL ELPH-MCNC: 3.9 G/DL — SIGNIFICANT CHANGE UP (ref 3.3–5)
ALP SERPL-CCNC: 106 U/L — SIGNIFICANT CHANGE UP (ref 40–120)
ALP SERPL-CCNC: 111 U/L — SIGNIFICANT CHANGE UP (ref 40–120)
ALT FLD-CCNC: 20 U/L — SIGNIFICANT CHANGE UP (ref 10–45)
ALT FLD-CCNC: 22 U/L — SIGNIFICANT CHANGE UP (ref 10–45)
ANION GAP SERPL CALC-SCNC: 11 MMOL/L — SIGNIFICANT CHANGE UP (ref 5–17)
ANION GAP SERPL CALC-SCNC: 12 MMOL/L — SIGNIFICANT CHANGE UP (ref 5–17)
APTT BLD: 147.2 SEC — CRITICAL HIGH (ref 27.5–35.5)
AST SERPL-CCNC: 29 U/L — SIGNIFICANT CHANGE UP (ref 10–40)
AST SERPL-CCNC: 30 U/L — SIGNIFICANT CHANGE UP (ref 10–40)
BASE EXCESS BLDV CALC-SCNC: 8.8 MMOL/L — HIGH (ref -2–3)
BASOPHILS # BLD AUTO: 0.04 K/UL — SIGNIFICANT CHANGE UP (ref 0–0.2)
BASOPHILS NFR BLD AUTO: 0.5 % — SIGNIFICANT CHANGE UP (ref 0–2)
BILIRUB SERPL-MCNC: 0.5 MG/DL — SIGNIFICANT CHANGE UP (ref 0.2–1.2)
BILIRUB SERPL-MCNC: 0.5 MG/DL — SIGNIFICANT CHANGE UP (ref 0.2–1.2)
BLD GP AB SCN SERPL QL: NEGATIVE — SIGNIFICANT CHANGE UP
BUN SERPL-MCNC: 104 MG/DL — HIGH (ref 7–23)
BUN SERPL-MCNC: 99 MG/DL — HIGH (ref 7–23)
CA-I SERPL-SCNC: 1.2 MMOL/L — SIGNIFICANT CHANGE UP (ref 1.15–1.33)
CALCIUM SERPL-MCNC: 10.1 MG/DL — SIGNIFICANT CHANGE UP (ref 8.4–10.5)
CALCIUM SERPL-MCNC: 9.9 MG/DL — SIGNIFICANT CHANGE UP (ref 8.4–10.5)
CHLORIDE BLDV-SCNC: 92 MMOL/L — LOW (ref 96–108)
CHLORIDE SERPL-SCNC: 90 MMOL/L — LOW (ref 96–108)
CHLORIDE SERPL-SCNC: 91 MMOL/L — LOW (ref 96–108)
CO2 BLDV-SCNC: 40 MMOL/L — HIGH (ref 22–26)
CO2 SERPL-SCNC: 31 MMOL/L — SIGNIFICANT CHANGE UP (ref 22–31)
CO2 SERPL-SCNC: 32 MMOL/L — HIGH (ref 22–31)
CREAT SERPL-MCNC: 2.78 MG/DL — HIGH (ref 0.5–1.3)
CREAT SERPL-MCNC: 2.83 MG/DL — HIGH (ref 0.5–1.3)
EGFR: 17 ML/MIN/1.73M2 — LOW
EGFR: 17 ML/MIN/1.73M2 — LOW
EOSINOPHIL # BLD AUTO: 0.03 K/UL — SIGNIFICANT CHANGE UP (ref 0–0.5)
EOSINOPHIL NFR BLD AUTO: 0.4 % — SIGNIFICANT CHANGE UP (ref 0–6)
GAS PNL BLDV: 131 MMOL/L — LOW (ref 136–145)
GAS PNL BLDV: SIGNIFICANT CHANGE UP
GLUCOSE BLDC GLUCOMTR-MCNC: 130 MG/DL — HIGH (ref 70–99)
GLUCOSE BLDC GLUCOMTR-MCNC: 83 MG/DL — SIGNIFICANT CHANGE UP (ref 70–99)
GLUCOSE BLDC GLUCOMTR-MCNC: 97 MG/DL — SIGNIFICANT CHANGE UP (ref 70–99)
GLUCOSE BLDV-MCNC: 131 MG/DL — HIGH (ref 70–99)
GLUCOSE SERPL-MCNC: 103 MG/DL — HIGH (ref 70–99)
GLUCOSE SERPL-MCNC: 134 MG/DL — HIGH (ref 70–99)
HCO3 BLDV-SCNC: 38 MMOL/L — HIGH (ref 22–29)
HCT VFR BLD CALC: 33 % — LOW (ref 34.5–45)
HCT VFR BLDA CALC: 31 % — LOW (ref 34.5–46.5)
HGB BLD CALC-MCNC: 10.3 G/DL — LOW (ref 11.7–16.1)
HGB BLD-MCNC: 9.8 G/DL — LOW (ref 11.5–15.5)
IMM GRANULOCYTES NFR BLD AUTO: 0.8 % — SIGNIFICANT CHANGE UP (ref 0–0.9)
LACTATE BLDV-MCNC: 1.1 MMOL/L — SIGNIFICANT CHANGE UP (ref 0.5–2)
LYMPHOCYTES # BLD AUTO: 1.38 K/UL — SIGNIFICANT CHANGE UP (ref 1–3.3)
LYMPHOCYTES # BLD AUTO: 18.2 % — SIGNIFICANT CHANGE UP (ref 13–44)
MAGNESIUM SERPL-MCNC: 2.6 MG/DL — SIGNIFICANT CHANGE UP (ref 1.6–2.6)
MAGNESIUM SERPL-MCNC: 2.7 MG/DL — HIGH (ref 1.6–2.6)
MCHC RBC-ENTMCNC: 29.3 PG — SIGNIFICANT CHANGE UP (ref 27–34)
MCHC RBC-ENTMCNC: 29.7 GM/DL — LOW (ref 32–36)
MCV RBC AUTO: 98.8 FL — SIGNIFICANT CHANGE UP (ref 80–100)
MONOCYTES # BLD AUTO: 0.76 K/UL — SIGNIFICANT CHANGE UP (ref 0–0.9)
MONOCYTES NFR BLD AUTO: 10 % — SIGNIFICANT CHANGE UP (ref 2–14)
NEUTROPHILS # BLD AUTO: 5.31 K/UL — SIGNIFICANT CHANGE UP (ref 1.8–7.4)
NEUTROPHILS NFR BLD AUTO: 70.1 % — SIGNIFICANT CHANGE UP (ref 43–77)
NRBC # BLD: 0 /100 WBCS — SIGNIFICANT CHANGE UP (ref 0–0)
NT-PROBNP SERPL-SCNC: HIGH PG/ML (ref 0–300)
PCO2 BLDV: 78 MMHG — HIGH (ref 39–42)
PH BLDV: 7.29 — LOW (ref 7.32–7.43)
PHOSPHATE SERPL-MCNC: 5 MG/DL — HIGH (ref 2.5–4.5)
PHOSPHATE SERPL-MCNC: 5.3 MG/DL — HIGH (ref 2.5–4.5)
PLATELET # BLD AUTO: 319 K/UL — SIGNIFICANT CHANGE UP (ref 150–400)
PO2 BLDV: 34 MMHG — SIGNIFICANT CHANGE UP (ref 25–45)
POTASSIUM BLDV-SCNC: 5 MMOL/L — SIGNIFICANT CHANGE UP (ref 3.5–5.1)
POTASSIUM SERPL-MCNC: 5 MMOL/L — SIGNIFICANT CHANGE UP (ref 3.5–5.3)
POTASSIUM SERPL-MCNC: 5.4 MMOL/L — HIGH (ref 3.5–5.3)
POTASSIUM SERPL-SCNC: 5 MMOL/L — SIGNIFICANT CHANGE UP (ref 3.5–5.3)
POTASSIUM SERPL-SCNC: 5.4 MMOL/L — HIGH (ref 3.5–5.3)
PROT SERPL-MCNC: 7.9 G/DL — SIGNIFICANT CHANGE UP (ref 6–8.3)
PROT SERPL-MCNC: 8.1 G/DL — SIGNIFICANT CHANGE UP (ref 6–8.3)
RBC # BLD: 3.34 M/UL — LOW (ref 3.8–5.2)
RBC # FLD: 15.2 % — HIGH (ref 10.3–14.5)
RH IG SCN BLD-IMP: POSITIVE — SIGNIFICANT CHANGE UP
SAO2 % BLDV: 47.2 % — LOW (ref 67–88)
SODIUM SERPL-SCNC: 133 MMOL/L — LOW (ref 135–145)
SODIUM SERPL-SCNC: 134 MMOL/L — LOW (ref 135–145)
TROPONIN T, HIGH SENSITIVITY RESULT: 61 NG/L — HIGH (ref 0–51)
WBC # BLD: 7.58 K/UL — SIGNIFICANT CHANGE UP (ref 3.8–10.5)
WBC # FLD AUTO: 7.58 K/UL — SIGNIFICANT CHANGE UP (ref 3.8–10.5)

## 2022-10-23 PROCEDURE — 99292 CRITICAL CARE ADDL 30 MIN: CPT | Mod: 25

## 2022-10-23 PROCEDURE — 93010 ELECTROCARDIOGRAM REPORT: CPT

## 2022-10-23 PROCEDURE — 99233 SBSQ HOSP IP/OBS HIGH 50: CPT

## 2022-10-23 PROCEDURE — 99291 CRITICAL CARE FIRST HOUR: CPT

## 2022-10-23 PROCEDURE — 71045 X-RAY EXAM CHEST 1 VIEW: CPT | Mod: 26

## 2022-10-23 PROCEDURE — 93306 TTE W/DOPPLER COMPLETE: CPT | Mod: 26

## 2022-10-23 PROCEDURE — 99291 CRITICAL CARE FIRST HOUR: CPT | Mod: 25

## 2022-10-23 RX ORDER — INSULIN GLARGINE 100 [IU]/ML
12 INJECTION, SOLUTION SUBCUTANEOUS AT BEDTIME
Refills: 0 | Status: DISCONTINUED | OUTPATIENT
Start: 2022-10-23 | End: 2022-11-04

## 2022-10-23 RX ORDER — ALPRAZOLAM 0.25 MG
0.5 TABLET ORAL ONCE
Refills: 0 | Status: DISCONTINUED | OUTPATIENT
Start: 2022-10-23 | End: 2022-10-23

## 2022-10-23 RX ORDER — INSULIN LISPRO 100/ML
VIAL (ML) SUBCUTANEOUS AT BEDTIME
Refills: 0 | Status: DISCONTINUED | OUTPATIENT
Start: 2022-10-23 | End: 2022-11-04

## 2022-10-23 RX ORDER — BUMETANIDE 0.25 MG/ML
4 INJECTION INTRAMUSCULAR; INTRAVENOUS
Refills: 0 | Status: DISCONTINUED | OUTPATIENT
Start: 2022-10-23 | End: 2022-10-23

## 2022-10-23 RX ORDER — AMIODARONE HYDROCHLORIDE 400 MG/1
0.5 TABLET ORAL
Qty: 900 | Refills: 0 | Status: COMPLETED | OUTPATIENT
Start: 2022-10-23 | End: 2022-10-23

## 2022-10-23 RX ORDER — AMIODARONE HYDROCHLORIDE 400 MG/1
200 TABLET ORAL DAILY
Refills: 0 | Status: DISCONTINUED | OUTPATIENT
Start: 2022-10-27 | End: 2022-11-02

## 2022-10-23 RX ORDER — INSULIN LISPRO 100/ML
VIAL (ML) SUBCUTANEOUS
Refills: 0 | Status: DISCONTINUED | OUTPATIENT
Start: 2022-10-23 | End: 2022-11-04

## 2022-10-23 RX ORDER — CHLORHEXIDINE GLUCONATE 213 G/1000ML
1 SOLUTION TOPICAL
Refills: 0 | Status: DISCONTINUED | OUTPATIENT
Start: 2022-10-23 | End: 2022-10-24

## 2022-10-23 RX ORDER — INSULIN GLARGINE 100 [IU]/ML
25 INJECTION, SOLUTION SUBCUTANEOUS AT BEDTIME
Refills: 0 | Status: DISCONTINUED | OUTPATIENT
Start: 2022-10-23 | End: 2022-10-23

## 2022-10-23 RX ORDER — INSULIN LISPRO 100/ML
VIAL (ML) SUBCUTANEOUS
Refills: 0 | Status: DISCONTINUED | OUTPATIENT
Start: 2022-10-23 | End: 2022-10-23

## 2022-10-23 RX ORDER — AMIODARONE HYDROCHLORIDE 400 MG/1
TABLET ORAL
Refills: 0 | Status: DISCONTINUED | OUTPATIENT
Start: 2022-10-23 | End: 2022-11-02

## 2022-10-23 RX ORDER — DIGOXIN 250 MCG
250 TABLET ORAL EVERY 6 HOURS
Refills: 0 | Status: DISCONTINUED | OUTPATIENT
Start: 2022-10-23 | End: 2022-10-23

## 2022-10-23 RX ORDER — BUMETANIDE 0.25 MG/ML
2 INJECTION INTRAMUSCULAR; INTRAVENOUS ONCE
Refills: 0 | Status: COMPLETED | OUTPATIENT
Start: 2022-10-23 | End: 2022-10-23

## 2022-10-23 RX ORDER — FERROUS SULFATE 325(65) MG
325 TABLET ORAL DAILY
Refills: 0 | Status: DISCONTINUED | OUTPATIENT
Start: 2022-10-23 | End: 2022-11-04

## 2022-10-23 RX ORDER — DIGOXIN 250 MCG
500 TABLET ORAL ONCE
Refills: 0 | Status: DISCONTINUED | OUTPATIENT
Start: 2022-10-23 | End: 2022-10-23

## 2022-10-23 RX ORDER — ISOSORBIDE MONONITRATE 60 MG/1
30 TABLET, EXTENDED RELEASE ORAL DAILY
Refills: 0 | Status: DISCONTINUED | OUTPATIENT
Start: 2022-10-23 | End: 2022-11-04

## 2022-10-23 RX ORDER — SIMVASTATIN 20 MG/1
10 TABLET, FILM COATED ORAL AT BEDTIME
Refills: 0 | Status: DISCONTINUED | OUTPATIENT
Start: 2022-10-23 | End: 2022-11-04

## 2022-10-23 RX ORDER — AMIODARONE HYDROCHLORIDE 400 MG/1
400 TABLET ORAL ONCE
Refills: 0 | Status: COMPLETED | OUTPATIENT
Start: 2022-10-23 | End: 2022-10-23

## 2022-10-23 RX ORDER — METOPROLOL TARTRATE 50 MG
5 TABLET ORAL ONCE
Refills: 0 | Status: COMPLETED | OUTPATIENT
Start: 2022-10-23 | End: 2022-10-23

## 2022-10-23 RX ORDER — METOPROLOL TARTRATE 50 MG
50 TABLET ORAL DAILY
Refills: 0 | Status: DISCONTINUED | OUTPATIENT
Start: 2022-10-23 | End: 2022-11-04

## 2022-10-23 RX ORDER — ALBUTEROL 90 UG/1
2 AEROSOL, METERED ORAL EVERY 6 HOURS
Refills: 0 | Status: DISCONTINUED | OUTPATIENT
Start: 2022-10-23 | End: 2022-11-04

## 2022-10-23 RX ORDER — INSULIN LISPRO 100/ML
10 VIAL (ML) SUBCUTANEOUS
Refills: 0 | Status: DISCONTINUED | OUTPATIENT
Start: 2022-10-23 | End: 2022-10-23

## 2022-10-23 RX ORDER — AMIODARONE HYDROCHLORIDE 400 MG/1
400 TABLET ORAL EVERY 8 HOURS
Refills: 0 | Status: COMPLETED | OUTPATIENT
Start: 2022-10-23 | End: 2022-10-26

## 2022-10-23 RX ORDER — DIGOXIN 250 MCG
500 TABLET ORAL ONCE
Refills: 0 | Status: COMPLETED | OUTPATIENT
Start: 2022-10-23 | End: 2022-10-23

## 2022-10-23 RX ORDER — PANTOPRAZOLE SODIUM 20 MG/1
40 TABLET, DELAYED RELEASE ORAL
Refills: 0 | Status: DISCONTINUED | OUTPATIENT
Start: 2022-10-23 | End: 2022-11-04

## 2022-10-23 RX ORDER — BUMETANIDE 0.25 MG/ML
6 INJECTION INTRAMUSCULAR; INTRAVENOUS ONCE
Refills: 0 | Status: COMPLETED | OUTPATIENT
Start: 2022-10-23 | End: 2022-10-23

## 2022-10-23 RX ORDER — HYDRALAZINE HCL 50 MG
10 TABLET ORAL EVERY 8 HOURS
Refills: 0 | Status: DISCONTINUED | OUTPATIENT
Start: 2022-10-23 | End: 2022-10-23

## 2022-10-23 RX ORDER — HEPARIN SODIUM 5000 [USP'U]/ML
1800 INJECTION INTRAVENOUS; SUBCUTANEOUS
Qty: 25000 | Refills: 0 | Status: DISCONTINUED | OUTPATIENT
Start: 2022-10-23 | End: 2022-10-30

## 2022-10-23 RX ORDER — BUMETANIDE 0.25 MG/ML
4 INJECTION INTRAMUSCULAR; INTRAVENOUS
Refills: 0 | Status: DISCONTINUED | OUTPATIENT
Start: 2022-10-24 | End: 2022-10-24

## 2022-10-23 RX ADMIN — AMIODARONE HYDROCHLORIDE 400 MILLIGRAM(S): 400 TABLET ORAL at 05:52

## 2022-10-23 RX ADMIN — CHLORHEXIDINE GLUCONATE 1 APPLICATION(S): 213 SOLUTION TOPICAL at 04:35

## 2022-10-23 RX ADMIN — AMIODARONE HYDROCHLORIDE 400 MILLIGRAM(S): 400 TABLET ORAL at 21:07

## 2022-10-23 RX ADMIN — PANTOPRAZOLE SODIUM 40 MILLIGRAM(S): 20 TABLET, DELAYED RELEASE ORAL at 06:02

## 2022-10-23 RX ADMIN — INSULIN GLARGINE 12 UNIT(S): 100 INJECTION, SOLUTION SUBCUTANEOUS at 23:00

## 2022-10-23 RX ADMIN — Medication 5 MILLIGRAM(S): at 00:33

## 2022-10-23 RX ADMIN — Medication 10 UNIT(S): at 16:47

## 2022-10-23 RX ADMIN — AMIODARONE HYDROCHLORIDE 400 MILLIGRAM(S): 400 TABLET ORAL at 15:02

## 2022-10-23 RX ADMIN — AMIODARONE HYDROCHLORIDE 400 MILLIGRAM(S): 400 TABLET ORAL at 02:05

## 2022-10-23 RX ADMIN — Medication 50 MILLIGRAM(S): at 05:51

## 2022-10-23 RX ADMIN — AMIODARONE HYDROCHLORIDE 16.7 MG/MIN: 400 TABLET ORAL at 01:30

## 2022-10-23 RX ADMIN — HEPARIN SODIUM 18 UNIT(S)/HR: 5000 INJECTION INTRAVENOUS; SUBCUTANEOUS at 05:53

## 2022-10-23 RX ADMIN — Medication 325 MILLIGRAM(S): at 12:14

## 2022-10-23 RX ADMIN — Medication 10 MILLIGRAM(S): at 05:51

## 2022-10-23 RX ADMIN — BUMETANIDE 148 MILLIGRAM(S): 0.25 INJECTION INTRAMUSCULAR; INTRAVENOUS at 20:57

## 2022-10-23 RX ADMIN — ISOSORBIDE MONONITRATE 30 MILLIGRAM(S): 60 TABLET, EXTENDED RELEASE ORAL at 17:53

## 2022-10-23 RX ADMIN — HEPARIN SODIUM 15 UNIT(S)/HR: 5000 INJECTION INTRAVENOUS; SUBCUTANEOUS at 16:47

## 2022-10-23 RX ADMIN — SIMVASTATIN 10 MILLIGRAM(S): 20 TABLET, FILM COATED ORAL at 21:07

## 2022-10-23 RX ADMIN — Medication 0.5 MILLIGRAM(S): at 22:53

## 2022-10-23 RX ADMIN — Medication 5 MILLIGRAM(S): at 05:52

## 2022-10-23 RX ADMIN — Medication 500 MICROGRAM(S): at 02:06

## 2022-10-23 RX ADMIN — BUMETANIDE 2 MILLIGRAM(S): 0.25 INJECTION INTRAMUSCULAR; INTRAVENOUS at 05:51

## 2022-10-23 RX ADMIN — BUMETANIDE 132 MILLIGRAM(S): 0.25 INJECTION INTRAMUSCULAR; INTRAVENOUS at 12:14

## 2022-10-23 RX ADMIN — Medication 250 MICROGRAM(S): at 05:51

## 2022-10-23 NOTE — ED PROVIDER NOTE - ATTENDING CONTRIBUTION TO CARE
attending Bahman: 72yF h/o COPD on O2 (3L) with severe pHTN, OHS/MIGUEL on home BiPAP (refuses to use), HTN, HLD transferred from OSH for rapid heart rate, initially though to be Vtach, given Amiodarone push and gtt without resolution, here for cardiology evaluation. ON arrival,  pt normotensive with HR 150s, EKG with likely A flutter. Will keep on cardiac monitor, review OSH records, discuss with cardiology

## 2022-10-23 NOTE — ED ADULT NURSE NOTE - OBJECTIVE STATEMENT
72F A&OX4 BIBEMS transfer from [place] complaining of vomiting. PMHX COPD on O2 (3L) with severe pHTN,HTN, HLD. Pt initially presented to [place] c/o vomiting ongoing for the past couple of days. Pt denies diarrhea. EMS reports pt was given amio 150push followed by gtt, sent to [facility] for further eval. Pt on the monitor tachy to 140s at presentation. Pt denies dizziness, headache. Labs was collected and sent to lab. Pt awaiting xray and cards eval. Pt pending dispo.

## 2022-10-23 NOTE — H&P ADULT - NSHPLABSRESULTS_GEN_ALL_CORE
9.8    7.58  )-----------( 319      ( 23 Oct 2022 00:25 )             33.0     10-23    133<L>  |  90<L>  |  104<H>  ----------------------------<  134<H>  5.0   |  31  |  2.78<H>    Ca    10.1      23 Oct 2022 00:25  Phos  5.0     10-23  Mg     2.7     10-23    TPro  8.1  /  Alb  3.9  /  TBili  0.5  /  DBili  x   /  AST  30  /  ALT  20  /  AlkPhos  111  10-23    < from: Xray Chest 1 View-PORTABLE IMMEDIATE (Xray Chest 1 View-PORTABLE IMMEDIATE .) (10.23.22 @ 00:39) >    IMPRESSION:  Pulmonary edema similar to prior exam.

## 2022-10-23 NOTE — CONSULT NOTE ADULT - SUBJECTIVE AND OBJECTIVE BOX
CARDIOLOGY FELLOW CONSULT NOTE    HPI: The patient is a 73yo female with a PMH of severe pulmonary hypertension, chronic diastolic HF, AF (not on AC), HTN, mitral and tricuspid valve replacement who presents to the ED as a transfer from [facility] for tachycardia. The pt states that she has been having emesis for the last 2 days and has been felling ill. She denies any chest pain, light-headedness, palpitations, does report some dyspnea on exertion. Upon presentation to , she was noted to be in the 140s with EKG showing wide complex tachycardia. BP was stable in the 110s. There was concern for VT vs SVT with aberrancy; the patient was given an Amiodarone load and started on a drip but remained refractory to the medication. The pt was ultimately transferred to [facility] where she was found to be persistently tachycardic. Per EMS there was intermittent AF en route. An EKG was obtained and discussed with the EP attending with concern for Aflutter. Beta blockade/CCB, however the patient's BP dropped to the 80s/90s with administration of IV Metoprolol.       PMHx:   Atrial fibrillation    Pulmonary hypertension    MIGUEL (obstructive sleep apnea)    COPD (chronic obstructive pulmonary disease)    CHF (congestive heart failure)    HTN (hypertension)    Gout    Mitral valve replaced    Tricuspid valve replaced    CHF (congestive heart failure)    Hypertension    COPD (chronic obstructive pulmonary disease)        PSHx:   No significant past surgical history    History of mitral valve replacement with mechanical valve    Tricuspid valve replaced    No significant past surgical history        Allergies:  No Known Allergies      Home Meds:    Current Medications:   aMIOdarone    Tablet 400 milliGRAM(s) Oral Once      FAMILY HISTORY:  Family history of hypertension (Father, Sibling)    Family history of stroke    Family history of hypertension (Mother)      REVIEW OF SYSTEMS:  CONSTITUTIONAL: + weakness, no fevers or chills  EYES/ENT: No visual changes;  No dysphagia  NECK: No pain or stiffness  RESPIRATORY: No cough, wheezing, hemoptysis; No current shortness of breath  CARDIOVASCULAR: No chest pain or palpitations; No lower extremity edema  GASTROINTESTINAL: No abdominal or epigastric pain. No nausea, + vomiting, no hematemesis; No diarrhea or constipation. No melena or hematochezia.  BACK: No back pain  GENITOURINARY: No dysuria, frequency or hematuria  NEUROLOGICAL: No numbness or weakness  SKIN: No itching, burning, rashes, or lesions   All other review of systems is negative unless indicated above.    Physical Exam:  T(F): 98 (10-22), Max: 98 (10-22)  HR: 143 (10-23) (102 - 153)  BP: 141/89 (10-23) (103/74 - 141/89)  RR: 21 (10-23)  SpO2: 100% (10-23)      Appearance: Moderate distress, reports fatigue; obese  Eyes: pink conjunctiva  HEENT: Normal oral mucosa  Cardiovascular: Tachycardic, normal S1, S2, no murmurs, rubs, or gallops; no edema; JVD not appreciable   Respiratory: Clear to anterior auscultation, exam limited due to body habitus   Gastrointestinal: soft, non-tender  Musculoskeletal: No clubbing; no joint deformity   Neurologic: Normal speech, no facial asymmetry  Psychiatry: AAOx3, mood & affect appropriate  Skin: No rashes, ecchymoses, or cyanosis of exposed skin     ECG: Personally reviewed    Echo: Personally reviewed    Stress Testing: Personally reviewed    Cath: Personally reviewed    CXR: Personally reviewed    Labs: Personally reviewed                        9.8    7.58  )-----------( 319      ( 23 Oct 2022 00:25 )             33.0     10-23    133<L>  |  90<L>  |  104<H>  ----------------------------<  134<H>  5.0   |  31  |  2.78<H>    Ca    10.1      23 Oct 2022 00:25  Phos  5.0     10-23  Mg     2.7     10-23    TPro  8.1  /  Alb  3.9  /  TBili  0.5  /  DBili  x   /  AST  30  /  ALT  20  /  AlkPhos  111  10-23        CARDIAC MARKERS ( 23 Oct 2022 00:25 )  61 ng/L / x     / x     / x     / x     / x            Serum Pro-Brain Natriuretic Peptide: 35432 pg/mL (10-23 @ 00:25)  Serum Pro-Brain Natriuretic Peptide: 10286 pg/mL (10-22 @ 19:55)

## 2022-10-23 NOTE — H&P ADULT - CRITICAL CARE ATTENDING COMMENT
71 yo woman with obesity, MIGUEL, COPD on home O2 mechanical MVR and TV repair (last surgery 2008 at [facility]) AF s/p ablation not taking anticoagulation apparently due to prior GI bleeding. Presented to the ER with AFL, N/V and weakness. Found to be in AFL treated with Amiodarone with cardioversion.     Exam is limited due to body habitus. JVP is severely elevated.  Cr-2.8, BUN~98, Na-134, Hgb-9.8    Reviewed R/LHC from 2020 at [facility]. Steven>2, PAP-53/19, PCWP~20, LVEDP-16-17    - Diurese with Bumex 4 mg IV BID  - Continue Metoprolol   - Stop Hydralazine  - Continue Amiodarone loading  - Obtain surgical record from [facility]   - TTE   - If not diuresing or Cr worsening, will perform RHC    Bishop Stevenson MD

## 2022-10-23 NOTE — PATIENT PROFILE ADULT - TRANSPORTATION
1640; pt arrived- c/o irreg contractions for past few days; states intact; service pt - was seeing Dr James Dumas, but was dismissed d/t missing appointments - at 4 mos; [facility] has not had [address] [place] [address] since then  [phone]; CNM in to see pt; ve done cx 2cm; pt encouraged to drink++ water- jug given  1830; pt states has a hx of UTIs; states is having some itchiness with voiding; denies contractions/pain at this time  1900; cnm given sbar report and has reviewed fh strip no

## 2022-10-23 NOTE — PATIENT PROFILE ADULT - FALL HARM RISK - HARM RISK INTERVENTIONS

## 2022-10-23 NOTE — ED ADULT NURSE REASSESSMENT NOTE - NS ED NURSE REASSESS COMMENT FT1
Amiodarone drip paused due to pt decreased BP. HR continues to be 144- ED Resident Judy louise and at bedside. ED Resident Judy VALDERRAMA states to not give PO heart arrythmia medication until HR decreases with Lopressor dose. ED Resident made aware HR decreased to 98 and was given EKG, but HR increased again to 140s. Amiodarone drip paused due to pt decreased BP. HR continues to be 144- ED Resident Judy VALDERRAMA. reginaldo louise and at bedside.

## 2022-10-23 NOTE — ED PROVIDER NOTE - OBJECTIVE STATEMENT
72F h/o COPD on O2 (3L) with severe pHTN, OHS/MIGUEL on home BiPAP (refuses to use), HTN, HLD p/w a flutter. Pt initially presented to [place] c/o vomiting ongoing for the past couple of days. Denies diarrhea. Found to be in ?vtach, given amio 150push followed by gtt, sent to [facility] for further eval. Pt persistently tachy to 140s at presentation, broke into Afib. EKG c/f a flutter. States she has been having some chest pain ongoing for the past couple of days.

## 2022-10-23 NOTE — H&P ADULT - ASSESSMENT
71 y/o F with pmhx of COPD on home O2(3L) with severe pHTN, MVR/TVR , AF s/p ablation,  OHS/MIGUEL on home biPAP, HTN, HLD who presents with one week of vomiting with poor oral intake also complaining of SOB and chest pain.  She also states she has been taking prednisone on/off for the past 3 months for chest tightness and does endorse some weight gain.     In the ED pt found to have A Flutter and given Amio 400 po and Dig loaded.     Neuro  - A and O x 4, maintain bedrest  - No complaints of pain    Resp  - Currently on 4 L NC, No exp wheezing   - Continue proair inhalation  - Will continue prednisone since pt is chronically been on it 3 months 71 y/o F with pmhx of COPD on home O2(3L) with severe pHTN, MVR/TVR , AF s/p ablation,  OHS/MIGUEL on home biPAP, HTN, HLD who presents with one week of vomiting with poor oral intake also complaining of SOB and chest pain.  She also states she has been taking prednisone on/off for the past 3 months for chest tightness and does endorse some weight gain.     In the ED pt found to have A Flutter and given Amio 400 po and Dig loaded.     Neuro  - A and O x 4, maintain bedrest  - No complaints of pain    Resp  - Currently on 4 L NC, No exp wheezing   - Continue proair inhalation  - Will continue prednisone since pt is chronically been on it 3 months    CV  Hx of AF w RVR now w AFlutter  - Not on home AC  - TTE 71 y/o F with pmhx of COPD on home O2(3L) with severe pHTN, MVR/TVR , AF s/p ablation,  OHS/MIGUEL on home biPAP, HTN, HLD who presents with one week of vomiting with poor oral intake also complaining of SOB and chest pain.  She also states she has been taking prednisone on/off for the past 3 months for chest tightness and does endorse some weight gain.     In the ED pt found to have A Flutter and given Amio 400 po and Dig loaded.     Neuro  - A and O x 4, maintain bedrest  - No complaints of pain    Resp  - Currently on 4 L NC, No exp wheezing   - Continue proair inhalation  - Will continue prednisone since pt is chronically been on it 3 months    CV  Hx of AF w RVR now w AFlutter  - Not on home AC  - obtain TTE, doesnt appear to be in cardiogenic shock but has pulm edema  - Give Bumex 2 mg IVP and keep net neg  - Continue home metoprolol and hydralazine    AFLutter  - Amio load  - EP following  - Continue Metoprolol    MVR/TVR  - Obtain ECHO    GI  - Continue DASH CC diet      - Voiding    Renal  - Cr 2.7 appears baseline is 1.7  - Renally dose digoxin    ID  -  71 y/o F with pmhx of COPD on home O2(3L) with severe pHTN, MVR/TVR , AF s/p ablation,  OHS/MIGUEL on home biPAP, HTN, HLD who presents with one week of vomiting with poor oral intake also complaining of SOB and chest pain.  She also states she has been taking prednisone on/off for the past 3 months for chest tightness and does endorse some weight gain.     In the ED pt found to have A Flutter and given Amio 400 po and Dig loaded.     Neuro  - A and O x 4, maintain bedrest  - No complaints of pain    Resp  - Currently on 4 L NC, No exp wheezing   - Continue proair inhalation  - Will continue prednisone since pt is chronically been on it 3 months    CV  Hx of AF w RVR now w AFlutter  - Not on home AC  - obtain TTE, doesnt appear to be in cardiogenic shock but has pulm edema  - Give Bumex 2 mg IVP and keep net neg  - Continue home metoprolol and hydralazine    AFLutter  - Amio load  - EP following  - Continue Metoprolol    MVR/TVR  - Obtain ECHO    GI  - Continue DASH CC diet      - Voiding    Renal  - Cr 2.7 appears baseline is 1.7  - Renally dose digoxin    ID  - NO WBC or fevers    Endo  - Continue hoem Lantus and premeal    Heme  - Will start systemic AC     Mala Delgado, DNP

## 2022-10-23 NOTE — CONSULT NOTE ADULT - ASSESSMENT
The patient is a 71yo female with a PMH of severe pulmonary hypertension, chronic diastolic HF, AF (not on AC), HTN, mitral and tricuspid valve replacement who presents to the ED as a transfer from [facility] for tachycardia. Upon arrival to [facility], the patient is persistently tachycardic and appears to be in aflutter.     Recommendations   - Tachycardia refractory to Amiodarone drip, transition to oral Amiodarone 400mg TID  - Beta blockage/CCB was initially recommended with stable BP in the 130s however the pt received Lopressor 5mg x1 with a drop in SBP to 80s/90s, Amiodarone was briefly stopped and BP came back up in the 110s  - If the patient develops hypotension again, cardioversion may be an option   - Repeat TTE   - Evaluate for reversible causes   - IVC unable to be appreciate don POCUS due to body habitus   - Would avoid further Diltiazem and Metoprolol for now given hypotensive response   - Digoxin load 0.5mg     Belle Driscoll MD  Cardiology Fellow     Recommendations are preliminary until cosigned by attending  The patient is a 71yo female with a PMH of severe pulmonary hypertension, chronic diastolic HF, AF (not on AC), HTN, mitral and tricuspid valve replacement who presents to the ED as a transfer from [facility] for tachycardia. Upon arrival to [facility], the patient is persistently tachycardic and appears to be in aflutter.     Recommendations   - Tachycardia refractory to Amiodarone drip, transition to oral Amiodarone 400mg TID  - Beta blockage/CCB was initially recommended with stable BP in the 130s however the pt received Lopressor 5mg x1 with a drop in SBP to 80s/90s, Amiodarone was briefly stopped and BP came back up in the 110s  - If the patient develops hypotension again, cardioversion may be an option   - Repeat TTE   - Evaluate for reversible causes   - IVC unable to be appreciate don POCUS due to body habitus   - Would avoid further Diltiazem and Metoprolol for now given hypotensive response but may consider initiating once BP resolves, Digoxin might be the only current option given intolerance of other agents/refractory rhythm, however Cr is elevated     Belle Driscoll MD  Cardiology Fellow     Recommendations are preliminary until cosigned by attending  The patient is a 73yo female with a PMH of severe pulmonary hypertension, chronic diastolic HF, AF (not on AC), HTN, mitral and tricuspid valve replacement who presents to the ED as a transfer from [facility] for tachycardia. Upon arrival to [facility], the patient is persistently tachycardic and appears to be in aflutter.     Recommendations   - Tachycardia refractory to Amiodarone drip, transition to oral Amiodarone 400mg TID  - Beta blockage/CCB was initially recommended with stable BP in the 130s however the pt received Lopressor 5mg x1 with a drop in SBP to 80s/90s, Amiodarone was briefly stopped and BP came back up in the 110s  - If the patient develops hypotension again, cardioversion may be an option   - Repeat TTE   - Evaluate for reversible causes   - IVC unable to be appreciate don POCUS due to body habitus   - Would avoid further Diltiazem and Metoprolol for now given hypotensive response but may consider initiating once BP resolves, Digoxin might be the only current option given intolerance of other agents/refractory rhythm, however Cr is elevated. Can be used when rapid ventricular rate control is necessary. CrCl >15 mL/minute: No dosage adjustment necessary.      Belle Driscoll MD  Cardiology Fellow     Recommendations are preliminary until cosigned by attending

## 2022-10-23 NOTE — PATIENT PROFILE ADULT - FUNCTIONAL ASSESSMENT - DAILY ACTIVITY SCORE.
Message from [employer]:  Gabriela Crockett Mon Nov 20, 2017 10:08 AM    This message was received as a [employer] message from the patient.  Please review the message and respond to the patient.     ----- Message -----  From: Rose Negro  Sent: 11/20/2017   6:17 AM  To: [employer] Default Message Pool  Subject: Medication Renewal Request                       Rose Negro would like a refill of the following medications:        albuterol 108 (90 BASE) MCG/ACT inhaler [REJI Rodriguez]    Preferred pharmacy: [id number] [place] [address] [address], [address]  Delivery method: Pickup  Preferred pick-up date and time: 11/20/2017  1:00 PM    Comment:    Last office visit 8-4-17  Last refill 2-14-17     18

## 2022-10-23 NOTE — ED PROVIDER NOTE - PHYSICAL EXAMINATION
G: obese elderly female sitting up, cooperative with exam   H: NCAT  E: EOMI   M: Mucous membranes moist   R: crackles in the L lower lung bases, nWOB  C: RRR  A: Soft, NT/ND, no rebound/guarding   MSK: no calf tenderness, 2+ BL LE edema

## 2022-10-23 NOTE — PROGRESS NOTE ADULT - SUBJECTIVE AND OBJECTIVE BOX
DAMARI GUERRERO  MRN-[id number]  Patient is a 72y old  Female who presents with a chief complaint of Vomiting (23 Oct 2022 04:23)    HPI:  71 y/o F with pmhx of COPD on home O2(3L) with severe pHTN, MVR/TVR , AF s/p ablation,  OHS/MIGUEL on home biPAP, HTN, HLD who presents with one week of vomiting with poor oral intake also complaining of SOB and chest pain.  She also states she has been taking prednisone on/off for the past 3 months for chest tightness and does endorse some weight gain.     In the ED pt found to have A Flutter and given Amio 400 po and Dig loaded.  (23 Oct 2022 04:23)      Surgery/Hospital course:    Overnight events:    REVIEW OF SYSTEMS:    CONSTITUTIONAL: No weakness, fevers or chills  EYES/ENT: No visual changes;  No vertigo or throat pain   NECK: No pain or stiffness  RESPIRATORY: No cough, wheezing, hemoptysis; No shortness of breath  CARDIOVASCULAR: No chest pain or palpitations  GASTROINTESTINAL: No abdominal or epigastric pain. No nausea, vomiting, or hematemesis; No diarrhea or constipation. No melena or hematochezia.  GENITOURINARY: No dysuria, frequency or hematuria  NEUROLOGICAL: No numbness or weakness  SKIN: No itching, rashes      Physical Exam:  Vital Signs Last 24 Hrs  T(C): 36.6 (23 Oct 2022 19:00), Max: 37.1 (23 Oct 2022 03:29)  T(F): 97.8 (23 Oct 2022 19:00), Max: 98.7 (23 Oct 2022 03:29)  HR: 130 (23 Oct 2022 23:00) (69 - 144)  BP: 118/72 (23 Oct 2022 22:00) (101/55 - 160/92)  BP(mean): 93 (23 Oct 2022 21:00) (74 - 116)  RR: 33 (23 Oct 2022 23:00) (17 - 36)  SpO2: 87% (23 Oct 2022 23:00) (87% - 100%)    Parameters below as of 23 Oct 2022 19:00  Patient On (Oxygen Delivery Method): nasal cannula w/ humidification  O2 Flow (L/min): 4    Physical Exam:   Constitutional: NAD, well-groomed, well-developed  HEENT: PERRLA, EOMI, no drainage or redness  Neck: supple,  No JVD  Respiratory: Breath Sounds equal & clear bilaterally to auscultation, no rales/rhonchi/wheezing, no accessory muscle use noted  Cardiovascular: Regular rate, regular rhythm, normal S1, S2; no murmurs or rub  Gastrointestinal: Soft, non-tender, non distended, + bowel sounds  Extremities: MARMOLEJO x 4, no peripheral edema, no cyanosis, no clubbing   Neurological: A+O x 3; speech clear and intact; no sensory, motor  deficits, normal reflexes  Skin: warm, dry, well perfused  Incisions:    ============================I/O===========================   I&O's Detail    22 Oct 2022 07:01  -  23 Oct 2022 07:00  --------------------------------------------------------  IN:    Heparin: 36 mL    Oral Fluid: 240 mL  Total IN: 276 mL    OUT:  Total OUT: 0 mL    Total NET: 276 mL      23 Oct 2022 07:01  -  23 Oct 2022 23:43  --------------------------------------------------------  IN:    Heparin: 148 mL    Oral Fluid: 630 mL  Total IN: 778 mL    OUT:    Indwelling Catheter - Urethral (mL): 1215 mL    Voided (mL): 150 mL  Total OUT: 1365 mL    Total NET: -587 mL        ============================ LABS =========================                        9.8    7.58  )-----------( 319      ( 23 Oct 2022 00:25 )             33.0     10-23    134<L>  |  91<L>  |  99<H>  ----------------------------<  103<H>  5.4<H>   |  32<H>  |  2.83<H>    Ca    9.9      23 Oct 2022 12:26  Phos  5.3     10-23  Mg     2.6     10-23    TPro  7.9  /  Alb  3.7  /  TBili  0.5  /  DBili  x   /  AST  29  /  ALT  22  /  AlkPhos  106  10-23    LIVER FUNCTIONS - ( 23 Oct 2022 12:26 )  Alb: 3.7 g/dL / Pro: 7.9 g/dL / ALK PHOS: 106 U/L / ALT: 22 U/L / AST: 29 U/L / GGT: x           PTT - ( 23 Oct 2022 12:26 )  PTT:147.2 sec      ======================Micro/Rad/Cardio=================  Culture: Reviewed   CXR: Reviewed  Echo:Reviewed  ======================================================  PAST MEDICAL & SURGICAL HISTORY:  Atrial fibrillation  S/P Ablation      Pulmonary hypertension      MIGUEL (obstructive sleep apnea)      COPD (chronic obstructive pulmonary disease)  on home O2      CHF (congestive heart failure)      HTN (hypertension)      Gout      Mitral valve replaced      Tricuspid valve replaced      CHF (congestive heart failure)      Hypertension      COPD (chronic obstructive pulmonary disease)      History of mitral valve replacement with mechanical valve      Tricuspid valve replaced  mechanical        ====================ASSESSMENT ==============  CNS:  RES:  CVS:  Hem:  Austen:  GI:  Endo:  ID:  Skin  Plan:  ====================== NEUROLOGY=====================    ==================== RESPIRATORY======================  Mechanical Ventilation:  Mode: AC/ CMV (Assist Control/ Continuous Mandatory Ventilation)  RR (machine): 16  TV (machine): 500  FiO2: 40  PEEP: 6  ITime: 1  MAP: 11  PIP: 20    ALBUTerol    90 MICROgram(s) HFA Inhaler 2 Puff(s) Inhalation every 6 hours PRN Bronchospasm    ====================CARDIOVASCULAR==================  aMIOdarone    Tablet 400 milliGRAM(s) Oral every 8 hours  aMIOdarone    Tablet   Oral   isosorbide   mononitrate ER Tablet (IMDUR) 30 milliGRAM(s) Oral daily  metoprolol succinate ER 50 milliGRAM(s) Oral daily    ===================HEMATOLOGIC/ONC ===================  heparin  Infusion 1800 Unit(s)/Hr (15 mL/Hr) IV Continuous <Continuous>    ===================== RENAL =========================  Continue monitoring urine output    ==================== GASTROINTESTINAL===================  ferrous    sulfate 325 milliGRAM(s) Oral daily  pantoprazole    Tablet 40 milliGRAM(s) Oral before breakfast    =======================    ENDOCRINE  =====================  insulin glargine Injectable (LANTUS) 12 Unit(s) SubCutaneous at bedtime  insulin lispro (ADMELOG) corrective regimen sliding scale   SubCutaneous three times a day before meals  insulin lispro (ADMELOG) corrective regimen sliding scale   SubCutaneous at bedtime  predniSONE   Tablet 5 milliGRAM(s) Oral daily  simvastatin 10 milliGRAM(s) Oral at bedtime    ========================INFECTIOUS DISEASE================      ========================PROPHYLACTIC MEASURE================  DVT  GI Protonix  Graft patency   Beta blocker      Patient requires continuous monitoring with bedside rhythm monitoring, arterial line, pulse oximetry, ventilator monitoring and intermittent blood gas analysis.  Care plan discussed with ICU care team.  Patient remained critical; required more than usual post op care; I have spent 35 minutes providing non-routine post op care, revaluated multiple times during the day.         DAMARI GUERRERO  MRN-[id number]  Patient is a 72y old  Female who presents with a chief complaint of Vomiting (23 Oct 2022 04:23)    HPI:  71 y/o F with pmhx of COPD on home O2(3L) with severe pHTN, MVR/TVR , AF s/p ablation,  OHS/MIGUEL on home biPAP, HTN, HLD who presents with one week of vomiting with poor oral intake also complaining of SOB and chest pain.  She also states she has been taking prednisone on/off for the past 3 months for chest tightness and does endorse some weight gain.     In the ED pt found to have A Flutter and given Amio 400 po and Dig loaded.  (23 Oct 2022 04:23)      24 hr events:     REVIEW OF SYSTEMS:    CONSTITUTIONAL: No weakness, fevers or chills  EYES/ENT: No visual changes;  No vertigo or throat pain   NECK: No pain or stiffness  RESPIRATORY: No cough, wheezing, hemoptysis; No shortness of breath  CARDIOVASCULAR: No chest pain or palpitations  GASTROINTESTINAL: No abdominal or epigastric pain. No nausea, vomiting, or hematemesis; No diarrhea or constipation. No melena or hematochezia.  GENITOURINARY: No dysuria, frequency or hematuria  NEUROLOGICAL: No numbness or weakness  SKIN: No itching, rashes      Physical Exam:  Vital Signs Last 24 Hrs  T(C): 36.6 (23 Oct 2022 19:00), Max: 37.1 (23 Oct 2022 03:29)  T(F): 97.8 (23 Oct 2022 19:00), Max: 98.7 (23 Oct 2022 03:29)  HR: 130 (23 Oct 2022 23:00) (69 - 144)  BP: 118/72 (23 Oct 2022 22:00) (101/55 - 160/92)  BP(mean): 93 (23 Oct 2022 21:00) (74 - 116)  RR: 33 (23 Oct 2022 23:00) (17 - 36)  SpO2: 87% (23 Oct 2022 23:00) (87% - 100%)    Parameters below as of 23 Oct 2022 19:00  Patient On (Oxygen Delivery Method): nasal cannula w/ humidification  O2 Flow (L/min): 4    Physical Exam:   Constitutional: NAD, well-groomed, well-developed  HEENT: PERRLA, EOMI, no drainage or redness  Neck: supple,  No JVD  Respiratory: Breath Sounds equal & clear bilaterally to auscultation, no rales/rhonchi/wheezing, no accessory muscle use noted  Cardiovascular: Regular rate, regular rhythm, normal S1, S2; no murmurs or rub  Gastrointestinal: Soft, non-tender, non distended, + bowel sounds  Extremities: MARMOLEJO x 4, no peripheral edema, no cyanosis, no clubbing   Neurological: A+O x 3; speech clear and intact; no sensory, motor  deficits, normal reflexes  Skin: warm, dry, well perfused  Incisions:    ============================I/O===========================   I&O's Detail    22 Oct 2022 07:01  -  23 Oct 2022 07:00  --------------------------------------------------------  IN:    Heparin: 36 mL    Oral Fluid: 240 mL  Total IN: 276 mL    OUT:  Total OUT: 0 mL    Total NET: 276 mL      23 Oct 2022 07:01  -  23 Oct 2022 23:43  --------------------------------------------------------  IN:    Heparin: 148 mL    Oral Fluid: 630 mL  Total IN: 778 mL    OUT:    Indwelling Catheter - Urethral (mL): 1215 mL    Voided (mL): 150 mL  Total OUT: 1365 mL    Total NET: -587 mL        ============================ LABS =========================                        9.8    7.58  )-----------( 319      ( 23 Oct 2022 00:25 )             33.0     10-23    134<L>  |  91<L>  |  99<H>  ----------------------------<  103<H>  5.4<H>   |  32<H>  |  2.83<H>    Ca    9.9      23 Oct 2022 12:26  Phos  5.3     10-23  Mg     2.6     10-23    TPro  7.9  /  Alb  3.7  /  TBili  0.5  /  DBili  x   /  AST  29  /  ALT  22  /  AlkPhos  106  10-23    LIVER FUNCTIONS - ( 23 Oct 2022 12:26 )  Alb: 3.7 g/dL / Pro: 7.9 g/dL / ALK PHOS: 106 U/L / ALT: 22 U/L / AST: 29 U/L / GGT: x           PTT - ( 23 Oct 2022 12:26 )  PTT:147.2 sec      ======================Micro/Rad/Cardio=================  Culture: Reviewed   CXR: Reviewed  Echo:Reviewed  ======================================================  PAST MEDICAL & SURGICAL HISTORY:  Atrial fibrillation  S/P Ablation      Pulmonary hypertension      MIGUEL (obstructive sleep apnea)      COPD (chronic obstructive pulmonary disease)  on home O2      CHF (congestive heart failure)      HTN (hypertension)      Gout      Mitral valve replaced      Tricuspid valve replaced      CHF (congestive heart failure)      Hypertension      COPD (chronic obstructive pulmonary disease)      History of mitral valve replacement with mechanical valve      Tricuspid valve replaced  mechanical        ====================ASSESSMENT ==============  CNS:  RES:  CVS:  Hem:  Austen:  GI:  Endo:  ID:  Skin  Plan:  ====================== NEUROLOGY=====================    ==================== RESPIRATORY======================  Mechanical Ventilation:  Mode: AC/ CMV (Assist Control/ Continuous Mandatory Ventilation)  RR (machine): 16  TV (machine): 500  FiO2: 40  PEEP: 6  ITime: 1  MAP: 11  PIP: 20    ALBUTerol    90 MICROgram(s) HFA Inhaler 2 Puff(s) Inhalation every 6 hours PRN Bronchospasm    ====================CARDIOVASCULAR==================  aMIOdarone    Tablet 400 milliGRAM(s) Oral every 8 hours  aMIOdarone    Tablet   Oral   isosorbide   mononitrate ER Tablet (IMDUR) 30 milliGRAM(s) Oral daily  metoprolol succinate ER 50 milliGRAM(s) Oral daily    ===================HEMATOLOGIC/ONC ===================  heparin  Infusion 1800 Unit(s)/Hr (15 mL/Hr) IV Continuous <Continuous>    ===================== RENAL =========================  Continue monitoring urine output    ==================== GASTROINTESTINAL===================  ferrous    sulfate 325 milliGRAM(s) Oral daily  pantoprazole    Tablet 40 milliGRAM(s) Oral before breakfast    =======================    ENDOCRINE  =====================  insulin glargine Injectable (LANTUS) 12 Unit(s) SubCutaneous at bedtime  insulin lispro (ADMELOG) corrective regimen sliding scale   SubCutaneous three times a day before meals  insulin lispro (ADMELOG) corrective regimen sliding scale   SubCutaneous at bedtime  predniSONE   Tablet 5 milliGRAM(s) Oral daily  simvastatin 10 milliGRAM(s) Oral at bedtime    ========================INFECTIOUS DISEASE================      ========================PROPHYLACTIC MEASURE================  DVT  GI Protonix  Graft patency   Beta blocker      Patient requires continuous monitoring with bedside rhythm monitoring, arterial line, pulse oximetry, ventilator monitoring and intermittent blood gas analysis.  Care plan discussed with ICU care team.  Patient remained critical; required more than usual post op care; I have spent 35 minutes providing non-routine post op care, revaluated multiple times during the day.         DAMARI GUERRERO  MRN-[id number]  Patient is a 72y old  Female who presents with a chief complaint of Vomiting (23 Oct 2022 04:23)    HPI:  73 y/o F with pmhx of COPD on home O2(3L) with severe pHTN, MVR/TVR , AF s/p ablation,  OHS/MIGUEL on home biPAP, HTN, HLD who presents with one week of vomiting with poor oral intake also complaining of SOB and chest pain.  She also states she has been taking prednisone on/off for the past 3 months for chest tightness and does endorse some weight gain.     In the ED pt found to have A Flutter and given Amio 400 po and Dig loaded.  (23 Oct 2022 04:23)      24 hr events: given bumex during day with appropriate response     REVIEW OF SYSTEMS:    CONSTITUTIONAL: No weakness, fevers or chills  EYES/ENT: No visual changes;  No vertigo or throat pain   NECK: No pain or stiffness  RESPIRATORY: No cough, wheezing, hemoptysis; +shortness of breath  CARDIOVASCULAR: No chest pain or palpitations  GASTROINTESTINAL: No abdominal or epigastric pain. No nausea, vomiting, or hematemesis; No diarrhea or constipation. No melena or hematochezia.  GENITOURINARY: No dysuria, frequency or hematuria  NEUROLOGICAL: No numbness or weakness  SKIN: No itching, rashes      Physical Exam:  Vital Signs Last 24 Hrs  T(C): 36.6 (23 Oct 2022 19:00), Max: 37.1 (23 Oct 2022 03:29)  T(F): 97.8 (23 Oct 2022 19:00), Max: 98.7 (23 Oct 2022 03:29)  HR: 130 (23 Oct 2022 23:00) (69 - 144)  BP: 118/72 (23 Oct 2022 22:00) (101/55 - 160/92)  BP(mean): 93 (23 Oct 2022 21:00) (74 - 116)  RR: 33 (23 Oct 2022 23:00) (17 - 36)  SpO2: 87% (23 Oct 2022 23:00) (87% - 100%)    Parameters below as of 23 Oct 2022 19:00  Patient On (Oxygen Delivery Method): nasal cannula w/ humidification  O2 Flow (L/min): 4    ============================I/O===========================   I&O's Detail    22 Oct 2022 07:01  -  23 Oct 2022 07:00  --------------------------------------------------------  IN:    Heparin: 36 mL    Oral Fluid: 240 mL  Total IN: 276 mL    OUT:  Total OUT: 0 mL    Total NET: 276 mL      23 Oct 2022 07:01  -  23 Oct 2022 23:43  --------------------------------------------------------  IN:    Heparin: 148 mL    Oral Fluid: 630 mL  Total IN: 778 mL    OUT:    Indwelling Catheter - Urethral (mL): 1215 mL    Voided (mL): 150 mL  Total OUT: 1365 mL    Total NET: -587 mL        ============================ LABS =========================                        9.8    7.58  )-----------( 319      ( 23 Oct 2022 00:25 )             33.0     10-23    134<L>  |  91<L>  |  99<H>  ----------------------------<  103<H>  5.4<H>   |  32<H>  |  2.83<H>    Ca    9.9      23 Oct 2022 12:26  Phos  5.3     10-23  Mg     2.6     10-23    TPro  7.9  /  Alb  3.7  /  TBili  0.5  /  DBili  x   /  AST  29  /  ALT  22  /  AlkPhos  106  10-23    LIVER FUNCTIONS - ( 23 Oct 2022 12:26 )  Alb: 3.7 g/dL / Pro: 7.9 g/dL / ALK PHOS: 106 U/L / ALT: 22 U/L / AST: 29 U/L / GGT: x           PTT - ( 23 Oct 2022 12:26 )  PTT:147.2 sec      ======================Micro/Rad/Cardio=================  Culture: Reviewed   CXR: Reviewed  Echo:Reviewed  ======================================================  PAST MEDICAL & SURGICAL HISTORY:  Atrial fibrillation  S/P Ablation      Pulmonary hypertension      MIGUEL (obstructive sleep apnea)      COPD (chronic obstructive pulmonary disease)  on home O2      CHF (congestive heart failure)      HTN (hypertension)      Gout      Mitral valve replaced      Tricuspid valve replaced      CHF (congestive heart failure)      Hypertension      COPD (chronic obstructive pulmonary disease)      History of mitral valve replacement with mechanical valve      Tricuspid valve replaced  mechanical        ====================ASSESSMENT ==============  73 y/o F with pmhx of COPD on home O2(3L) with severe pHTN, MVR/TVR , AF s/p ablation,  OHS/MIGUEL on home biPAP, HTN, HLD who presents with one week of vomiting with poor oral intake also complaining of SOB and chest pain.  She also states she has been taking prednisone on/off for the past 3 months for chest tightness and does endorse some weight gain.     In the ED pt found to have A Flutter and given Amio 400 po and Dig loaded.     Plan:  ====================== NEUROLOGY=====================  - A and O x 4, maintain bedrest  - No complaints of pain    ==================== RESPIRATORY======================  SOB  - Currently on 4 L NC  - Continue proair inhalation  - Will continue prednisone 5 since pt is chronically been on it 3 months  - c/w diuresis     ALBUTerol    90 MICROgram(s) HFA Inhaler 2 Puff(s) Inhalation every 6 hours PRN Bronchospasm    ====================CARDIOVASCULAR==================  Hx of AF w RVR now w AFlutter  - Not on home AC  - obtain TTE, doesnt appear to be in cardiogenic shock but has pulm edema  - S/p 4 bumex during day and additional 6 bumex evening  - s/p metolazone  - Continue home metoprolol and hydralazine  - c/w heparin for AC    AFLutter  - PO Amio load  - EP following  - Continue Metoprolol    MVR/TVR  - Echo obtained on 10/23 however technically very difficult study       aMIOdarone    Tablet 400 milliGRAM(s) Oral every 8 hours  aMIOdarone    Tablet   Oral   isosorbide   mononitrate ER Tablet (IMDUR) 30 milliGRAM(s) Oral daily  metoprolol succinate ER 50 milliGRAM(s) Oral daily    ===================HEMATOLOGIC/ONC ===================    - c/w heparin gtt    heparin  Infusion 1800 Unit(s)/Hr (15 mL/Hr) IV Continuous <Continuous>    ===================== RENAL =========================  MISAEL  - Cr 3.01 (prev 2.83)   - c/w diuresis as above    ==================== GASTROINTESTINAL===================  - tolerating PO diet  - c/w bowel regimen    ferrous    sulfate 325 milliGRAM(s) Oral daily  pantoprazole    Tablet 40 milliGRAM(s) Oral before breakfast    =======================    ENDOCRINE  =====================  DM2  - c/w home lantus   - c/w iSS    insulin glargine Injectable (LANTUS) 12 Unit(s) SubCutaneous at bedtime  insulin lispro (ADMELOG) corrective regimen sliding scale   SubCutaneous three times a day before meals  insulin lispro (ADMELOG) corrective regimen sliding scale   SubCutaneous at bedtime  predniSONE   Tablet 5 milliGRAM(s) Oral daily  simvastatin 10 milliGRAM(s) Oral at bedtime    ========================INFECTIOUS DISEASE================  Afebrile, no leukocytosis   - monitor off abx for now         Patient requires continuous monitoring with bedside rhythm monitoring, pulse ox monitoring, and intermittent blood gas analysis. Care plan discussed with ICU care team. Patient remained critical and at risk for life threatening decompensation.  Patient seen, examined and plan discussed with CCU team during rounds.     I have personally provided 35 minutes of critical care time excluding time spent on separate procedures, in addition to initial critical care time provided by the CICU Attending, Dr. Phan

## 2022-10-23 NOTE — H&P ADULT - NSHPPHYSICALEXAM_GEN_ALL_CORE
PHYSICAL EXAM:  Constitutional: F in apparent distress  Respiratory: CTA B/L   Cardiovascular: S1, S2, WNL. + edema   Gastrointestinal: abd obese, soft, NT  Genitourinary: voiding  Extremities: warm and well perfused  Vascular: DP/PT palpable  Neurological: A and O x 4  Skin: dry and intact  Musculoskeletal: strength   Psychiatric: normal affect

## 2022-10-23 NOTE — H&P ADULT - HISTORY OF PRESENT ILLNESS
73 y/o F with pmhx of COPD on home O2(3L) with severe pHTN, MVR/TVR , OHS/MIGUEL on home biPAP, HTN, HLD who presents with one week of vomiting with poor oral intake. She also states she has been taking prednisone on/off for the past 3 months for chest tightness and does endorse some weight gain    In the ED pt found to have A Flutter and given Amio 400 po and Dig loaded.  73 y/o F with pmhx of COPD on home O2(3L) with severe pHTN, MVR/TVR , OHS/MIGUEL on home biPAP, HTN, HLD who presents with one week of vomiting with poor oral intake also complaining of SOB and chest pain.  She also states she has been taking prednisone on/off for the past 3 months for chest tightness and does endorse some weight gain.     In the ED pt found to have A Flutter and given Amio 400 po and Dig loaded.  71 y/o F with pmhx of COPD on home O2(3L) with severe pHTN, MVR/TVR , AF s/p ablation,  OHS/MIGUEL on home biPAP, HTN, HLD who presents with one week of vomiting with poor oral intake also complaining of SOB and chest pain.  She also states she has been taking prednisone on/off for the past 3 months for chest tightness and does endorse some weight gain.     In the ED pt found to have A Flutter and given Amio 400 po and Dig loaded.

## 2022-10-23 NOTE — ED PROVIDER NOTE - CLINICAL SUMMARY MEDICAL DECISION MAKING FREE TEXT BOX
72F h/o COPD on O2 (3L) with severe pHTN, OHS/MIGUEL on home BiPAP (refuses to use), HTN, HLD p/w a flutter. Will give lopressor, obtain labs, XR, reassess

## 2022-10-23 NOTE — CHART NOTE - NSCHARTNOTEFT_GEN_A_CORE
CICU DAY NOTE    Admission date: 10/23/22  Chief complaint/ Diagnosis: AFLUTTER/AFIB   HPI: 71 y/o F with pmhx of COPD on home O2(3L) with severe pHTN, MVR/TVR , AF s/p ablation,  OHS/MIGUEL on home biPAP, HTN, HLD who presents with one week of vomiting with poor oral intake also complaining of SOB and chest pain.  She also states she has been taking prednisone on/off for the past 3 months for chest tightness and does endorse some weight gain. In the ED pt found to have A Flutter and given Amio 400 po and Dig loaded.  (23 Oct 2022 04:23)    Interval history: admitted w/ rapid afib/aflutter s/p Amio and dig load  currently on amio PO load   TTE with Doppler (w/Cont) (08.24.22)  1. Mechanical prosthetic mitral valve replacement. Minimal mitral regurgitation. Mean transmitral valve gradient equals 6 mm Hg, which is elevated even in the setting of a prosthetic valve.  2. Endocardium not well visualized; grossly normal left ventricular systolic function. Endocardial visualization enhanced with intravenous injection of echo contrast (Definity). Septal consistent with right ventricular overload.  3. Right ventricular enlargement with decreased right ventricular systolic function.    REVIEW OF SYSTEMS  Denies CP, Palpitation, SOB, Dyspnea [ x] All other systems negative    MEDICATIONS  (STANDING)  aMIOdarone    Tablet 400 milliGRAM(s) Oral every 8 hours  chlorhexidine 2% Cloths 1 Application(s) Topical <User Schedule>  digoxin  Injectable 250 MICROGram(s) IV Push every 6 hours  ferrous    sulfate 325 milliGRAM(s) Oral daily  heparin  Infusion 1800 Unit(s)/Hr (18 mL/Hr) IV Continuous <Continuous>  hydrALAZINE 10 milliGRAM(s) Oral every 8 hours  insulin glargine Injectable (LANTUS) 25 Unit(s) SubCutaneous at bedtime  insulin lispro (ADMELOG) corrective regimen sliding scale   SubCutaneous three times a day before meals  insulin lispro Injectable (ADMELOG) 10 Unit(s) SubCutaneous three times a day before meals  metoprolol succinate ER 50 milliGRAM(s) Oral daily  pantoprazole    Tablet 40 milliGRAM(s) Oral before breakfast  predniSONE   Tablet 5 milliGRAM(s) Oral daily  simvastatin 10 milliGRAM(s) Oral at bedtime    MEDICATIONS  (PRN):  ALBUTerol    90 MICROgram(s) HFA Inhaler 2 Puff(s) Inhalation every 6 hours PRN Bronchospasm      PAST MEDICAL & SURGICAL HISTORY:  Atrial fibrillation S/P Ablation  Pulmonary hypertension  MIGUEL (obstructive sleep apnea)  COPD (chronic obstructive pulmonary disease) on home O2  CHF (congestive heart failure)  HTN (hypertension)  Gout  Mitral valve replaced  Tricuspid valve replaced  CHF (congestive heart failure)    FAMILY HISTORY:  Family history of hypertension (Father, Sibling)  Family history of stroke  Family history of hypertension (Mother)    Allergy   No Known Allergies    ICU Vital Signs Last 24 Hrs  T(C): 36.6 (Max: 37.1)  HR: 90  (69 - 153)  BP: 127/62  (103/74 - 141/89)  BP(mean): 88 (85 - 116)  RR: 35 (19 - 35)  SpO2: 93%  (93% - 100%) on NC 4L     I&O's Summary  IN: 258 mL / OUT: 0 mL / NET: 258 mL    PHYSICAL EXAM  Appearance: Normal, NAD  HEAD:   Normocephalic  EYES: PERRLA, conjunctiva and sclera clear  NECK: Supple, No JVD  CHEST/LUNG: Clear to auscultation bilaterally; No wheezing  HEART: Normal S1, S2. No murmurs, rubs, or gallops  ABDOMEN: + Bowel sounds, Soft, NT, ND   EXTREMITIES:  2+ Peripheral Pulses, No clubbing, cyanosis, or edema  NEUROLOGY: non-focal, aaox3  SKIN: No rashes or lesions    Interpretation of Telemetry:                        9.8    7.58  )-----------( 319                   33.0       133<133  |  90<L>  |  104<H>  ----------------------------<  134<H>  5.0   |  31  |  2.78<2.87<1.67    Ca    10.1     Phos  5.0    Mg     2.7      TPro  8.1  /  Alb  3.9  /  TBili  0.5  /  DBili  x   /  AST  30  /  ALT  20  /  AlkPhos  111    ASSESSMENT AND PLAN  71 y/o F with pmhx of COPD on home O2(3L) with severe pHTN, MVR/TVR , AF s/p ablation,  OHS/MIGUEL on home biPAP, HTN, HLD who presents with one week of vomiting with poor oral intake also complaining of SOB and chest pain.  She also states she has been taking prednisone on/off for the past 3 months for chest tightness and does endorse some weight gain.     In the ED pt found to have A Flutter and given Amio 400 po and Dig loaded.     Neuro  - A and O x 4, maintain bedrest  - No complaints of pain    Resp  - Currently on 4 L NC, No exp wheezing   - Continue proair inhalation  - Will continue prednisone since pt is chronically been on it 3 months    CV  Hx of AF w RVR now w AFlutter  - Not on home AC  - obtain TTE, doesnt appear to be in cardiogenic shock but has pulm edema  - Give Bumex 2 mg IVP and keep net neg  - Continue home metoprolol and hydralazine    AFLutter  - Amio load  - EP following  - Continue Metoprolol    MVR/TVR  - Obtain ECHO    GI  - Continue DASH CC diet      - Voiding    Renal  - Cr 2.7 appears baseline is 1.7  - Renally dose digoxin    ID  - NO WBC or fevers    Endo  - Continue hoem Lantus and premeal    Heme  - Will start systemic AC

## 2022-10-24 LAB
ALBUMIN SERPL ELPH-MCNC: 3.7 G/DL — SIGNIFICANT CHANGE UP (ref 3.3–5)
ALBUMIN SERPL ELPH-MCNC: 3.8 G/DL — SIGNIFICANT CHANGE UP (ref 3.3–5)
ALBUMIN SERPL ELPH-MCNC: 4.2 G/DL — SIGNIFICANT CHANGE UP (ref 3.3–5)
ALP SERPL-CCNC: 104 U/L — SIGNIFICANT CHANGE UP (ref 40–120)
ALP SERPL-CCNC: 104 U/L — SIGNIFICANT CHANGE UP (ref 40–120)
ALP SERPL-CCNC: 116 U/L — SIGNIFICANT CHANGE UP (ref 40–120)
ALT FLD-CCNC: 18 U/L — SIGNIFICANT CHANGE UP (ref 10–45)
ALT FLD-CCNC: 19 U/L — SIGNIFICANT CHANGE UP (ref 10–45)
ALT FLD-CCNC: 20 U/L — SIGNIFICANT CHANGE UP (ref 10–45)
ANION GAP SERPL CALC-SCNC: 11 MMOL/L — SIGNIFICANT CHANGE UP (ref 5–17)
ANION GAP SERPL CALC-SCNC: 12 MMOL/L — SIGNIFICANT CHANGE UP (ref 5–17)
ANION GAP SERPL CALC-SCNC: 14 MMOL/L — SIGNIFICANT CHANGE UP (ref 5–17)
APTT BLD: 123.6 SEC — CRITICAL HIGH (ref 27.5–35.5)
APTT BLD: 53.5 SEC — HIGH (ref 27.5–35.5)
APTT BLD: 76 SEC — HIGH (ref 27.5–35.5)
APTT BLD: >200 SEC — CRITICAL HIGH (ref 27.5–35.5)
AST SERPL-CCNC: 24 U/L — SIGNIFICANT CHANGE UP (ref 10–40)
AST SERPL-CCNC: 25 U/L — SIGNIFICANT CHANGE UP (ref 10–40)
AST SERPL-CCNC: 29 U/L — SIGNIFICANT CHANGE UP (ref 10–40)
BASOPHILS # BLD AUTO: 0.03 K/UL — SIGNIFICANT CHANGE UP (ref 0–0.2)
BASOPHILS NFR BLD AUTO: 0.4 % — SIGNIFICANT CHANGE UP (ref 0–2)
BILIRUB SERPL-MCNC: 0.4 MG/DL — SIGNIFICANT CHANGE UP (ref 0.2–1.2)
BILIRUB SERPL-MCNC: 0.4 MG/DL — SIGNIFICANT CHANGE UP (ref 0.2–1.2)
BILIRUB SERPL-MCNC: 0.5 MG/DL — SIGNIFICANT CHANGE UP (ref 0.2–1.2)
BLD GP AB SCN SERPL QL: NEGATIVE — SIGNIFICANT CHANGE UP
BUN SERPL-MCNC: 102 MG/DL — HIGH (ref 7–23)
BUN SERPL-MCNC: 104 MG/DL — HIGH (ref 7–23)
BUN SERPL-MCNC: 106 MG/DL — HIGH (ref 7–23)
CALCIUM SERPL-MCNC: 9.3 MG/DL — SIGNIFICANT CHANGE UP (ref 8.4–10.5)
CALCIUM SERPL-MCNC: 9.4 MG/DL — SIGNIFICANT CHANGE UP (ref 8.4–10.5)
CALCIUM SERPL-MCNC: 9.9 MG/DL — SIGNIFICANT CHANGE UP (ref 8.4–10.5)
CHLORIDE SERPL-SCNC: 87 MMOL/L — LOW (ref 96–108)
CHLORIDE SERPL-SCNC: 88 MMOL/L — LOW (ref 96–108)
CHLORIDE SERPL-SCNC: 89 MMOL/L — LOW (ref 96–108)
CHOLEST SERPL-MCNC: 187 MG/DL — SIGNIFICANT CHANGE UP
CO2 SERPL-SCNC: 32 MMOL/L — HIGH (ref 22–31)
CO2 SERPL-SCNC: 33 MMOL/L — HIGH (ref 22–31)
CO2 SERPL-SCNC: 33 MMOL/L — HIGH (ref 22–31)
CREAT SERPL-MCNC: 2.96 MG/DL — HIGH (ref 0.5–1.3)
CREAT SERPL-MCNC: 2.97 MG/DL — HIGH (ref 0.5–1.3)
CREAT SERPL-MCNC: 3.01 MG/DL — HIGH (ref 0.5–1.3)
EGFR: 16 ML/MIN/1.73M2 — LOW
EOSINOPHIL # BLD AUTO: 0.14 K/UL — SIGNIFICANT CHANGE UP (ref 0–0.5)
EOSINOPHIL NFR BLD AUTO: 2 % — SIGNIFICANT CHANGE UP (ref 0–6)
GLUCOSE BLDC GLUCOMTR-MCNC: 129 MG/DL — HIGH (ref 70–99)
GLUCOSE BLDC GLUCOMTR-MCNC: 132 MG/DL — HIGH (ref 70–99)
GLUCOSE BLDC GLUCOMTR-MCNC: 95 MG/DL — SIGNIFICANT CHANGE UP (ref 70–99)
GLUCOSE SERPL-MCNC: 133 MG/DL — HIGH (ref 70–99)
GLUCOSE SERPL-MCNC: 152 MG/DL — HIGH (ref 70–99)
GLUCOSE SERPL-MCNC: 196 MG/DL — HIGH (ref 70–99)
HCT VFR BLD CALC: 33.7 % — LOW (ref 34.5–45)
HDLC SERPL-MCNC: 83 MG/DL — SIGNIFICANT CHANGE UP
HGB BLD-MCNC: 10.2 G/DL — LOW (ref 11.5–15.5)
IMM GRANULOCYTES NFR BLD AUTO: 0.7 % — SIGNIFICANT CHANGE UP (ref 0–0.9)
INR BLD: 1.12 RATIO — SIGNIFICANT CHANGE UP (ref 0.88–1.16)
INR BLD: 1.2 RATIO — HIGH (ref 0.88–1.16)
LACTATE SERPL-SCNC: 1 MMOL/L — SIGNIFICANT CHANGE UP (ref 0.5–2)
LDH SERPL L TO P-CCNC: 439 U/L — HIGH (ref 50–242)
LIPID PNL WITH DIRECT LDL SERPL: 88 MG/DL — SIGNIFICANT CHANGE UP
LYMPHOCYTES # BLD AUTO: 1.04 K/UL — SIGNIFICANT CHANGE UP (ref 1–3.3)
LYMPHOCYTES # BLD AUTO: 14.6 % — SIGNIFICANT CHANGE UP (ref 13–44)
MAGNESIUM SERPL-MCNC: 2.3 MG/DL — SIGNIFICANT CHANGE UP (ref 1.6–2.6)
MAGNESIUM SERPL-MCNC: 2.5 MG/DL — SIGNIFICANT CHANGE UP (ref 1.6–2.6)
MCHC RBC-ENTMCNC: 29.7 PG — SIGNIFICANT CHANGE UP (ref 27–34)
MCHC RBC-ENTMCNC: 30.3 GM/DL — LOW (ref 32–36)
MCV RBC AUTO: 98.3 FL — SIGNIFICANT CHANGE UP (ref 80–100)
MONOCYTES # BLD AUTO: 0.61 K/UL — SIGNIFICANT CHANGE UP (ref 0–0.9)
MONOCYTES NFR BLD AUTO: 8.6 % — SIGNIFICANT CHANGE UP (ref 2–14)
NEUTROPHILS # BLD AUTO: 5.24 K/UL — SIGNIFICANT CHANGE UP (ref 1.8–7.4)
NEUTROPHILS NFR BLD AUTO: 73.7 % — SIGNIFICANT CHANGE UP (ref 43–77)
NON HDL CHOLESTEROL: 104 MG/DL — SIGNIFICANT CHANGE UP
NRBC # BLD: 0 /100 WBCS — SIGNIFICANT CHANGE UP (ref 0–0)
NT-PROBNP SERPL-SCNC: HIGH PG/ML (ref 0–300)
NT-PROBNP SERPL-SCNC: HIGH PG/ML (ref 0–300)
PHOSPHATE SERPL-MCNC: 4.7 MG/DL — HIGH (ref 2.5–4.5)
PHOSPHATE SERPL-MCNC: 5 MG/DL — HIGH (ref 2.5–4.5)
PLATELET # BLD AUTO: 324 K/UL — SIGNIFICANT CHANGE UP (ref 150–400)
POTASSIUM SERPL-MCNC: 4.6 MMOL/L — SIGNIFICANT CHANGE UP (ref 3.5–5.3)
POTASSIUM SERPL-MCNC: 5 MMOL/L — SIGNIFICANT CHANGE UP (ref 3.5–5.3)
POTASSIUM SERPL-MCNC: 5.6 MMOL/L — HIGH (ref 3.5–5.3)
POTASSIUM SERPL-SCNC: 4.6 MMOL/L — SIGNIFICANT CHANGE UP (ref 3.5–5.3)
POTASSIUM SERPL-SCNC: 5 MMOL/L — SIGNIFICANT CHANGE UP (ref 3.5–5.3)
POTASSIUM SERPL-SCNC: 5.6 MMOL/L — HIGH (ref 3.5–5.3)
PROT SERPL-MCNC: 7.7 G/DL — SIGNIFICANT CHANGE UP (ref 6–8.3)
PROT SERPL-MCNC: 7.8 G/DL — SIGNIFICANT CHANGE UP (ref 6–8.3)
PROT SERPL-MCNC: 8.2 G/DL — SIGNIFICANT CHANGE UP (ref 6–8.3)
PROTHROM AB SERPL-ACNC: 12.9 SEC — SIGNIFICANT CHANGE UP (ref 10.5–13.4)
PROTHROM AB SERPL-ACNC: 13.9 SEC — HIGH (ref 10.5–13.4)
RBC # BLD: 3.43 M/UL — LOW (ref 3.8–5.2)
RBC # FLD: 15.1 % — HIGH (ref 10.3–14.5)
RH IG SCN BLD-IMP: POSITIVE — SIGNIFICANT CHANGE UP
SODIUM SERPL-SCNC: 131 MMOL/L — LOW (ref 135–145)
SODIUM SERPL-SCNC: 133 MMOL/L — LOW (ref 135–145)
SODIUM SERPL-SCNC: 135 MMOL/L — SIGNIFICANT CHANGE UP (ref 135–145)
TRIGL SERPL-MCNC: 78 MG/DL — SIGNIFICANT CHANGE UP
TSH SERPL-MCNC: 1.4 UIU/ML — SIGNIFICANT CHANGE UP (ref 0.27–4.2)
WBC # BLD: 7.11 K/UL — SIGNIFICANT CHANGE UP (ref 3.8–10.5)
WBC # FLD AUTO: 7.11 K/UL — SIGNIFICANT CHANGE UP (ref 3.8–10.5)

## 2022-10-24 PROCEDURE — 99233 SBSQ HOSP IP/OBS HIGH 50: CPT

## 2022-10-24 PROCEDURE — 93010 ELECTROCARDIOGRAM REPORT: CPT

## 2022-10-24 PROCEDURE — 99233 SBSQ HOSP IP/OBS HIGH 50: CPT | Mod: GC,25

## 2022-10-24 RX ORDER — INSULIN HUMAN 100 [IU]/ML
10 INJECTION, SOLUTION SUBCUTANEOUS ONCE
Refills: 0 | Status: COMPLETED | OUTPATIENT
Start: 2022-10-24 | End: 2022-10-24

## 2022-10-24 RX ORDER — LANOLIN ALCOHOL/MO/W.PET/CERES
3 CREAM (GRAM) TOPICAL AT BEDTIME
Refills: 0 | Status: DISCONTINUED | OUTPATIENT
Start: 2022-10-24 | End: 2022-11-04

## 2022-10-24 RX ORDER — SODIUM ZIRCONIUM CYCLOSILICATE 10 G/10G
10 POWDER, FOR SUSPENSION ORAL EVERY 8 HOURS
Refills: 0 | Status: DISCONTINUED | OUTPATIENT
Start: 2022-10-24 | End: 2022-10-25

## 2022-10-24 RX ORDER — BUMETANIDE 0.25 MG/ML
2 INJECTION INTRAMUSCULAR; INTRAVENOUS
Qty: 20 | Refills: 0 | Status: DISCONTINUED | OUTPATIENT
Start: 2022-10-24 | End: 2022-10-25

## 2022-10-24 RX ORDER — LIDOCAINE 4 G/100G
1 CREAM TOPICAL DAILY
Refills: 0 | Status: DISCONTINUED | OUTPATIENT
Start: 2022-10-24 | End: 2022-11-04

## 2022-10-24 RX ORDER — DEXTROSE 50 % IN WATER 50 %
50 SYRINGE (ML) INTRAVENOUS ONCE
Refills: 0 | Status: COMPLETED | OUTPATIENT
Start: 2022-10-24 | End: 2022-10-24

## 2022-10-24 RX ORDER — ACETAMINOPHEN 500 MG
650 TABLET ORAL EVERY 6 HOURS
Refills: 0 | Status: DISCONTINUED | OUTPATIENT
Start: 2022-10-24 | End: 2022-11-04

## 2022-10-24 RX ADMIN — SODIUM ZIRCONIUM CYCLOSILICATE 10 GRAM(S): 10 POWDER, FOR SUSPENSION ORAL at 17:24

## 2022-10-24 RX ADMIN — AMIODARONE HYDROCHLORIDE 400 MILLIGRAM(S): 400 TABLET ORAL at 21:44

## 2022-10-24 RX ADMIN — BUMETANIDE 10 MG/HR: 0.25 INJECTION INTRAMUSCULAR; INTRAVENOUS at 21:47

## 2022-10-24 RX ADMIN — CHLORHEXIDINE GLUCONATE 1 APPLICATION(S): 213 SOLUTION TOPICAL at 05:54

## 2022-10-24 RX ADMIN — SIMVASTATIN 10 MILLIGRAM(S): 20 TABLET, FILM COATED ORAL at 21:45

## 2022-10-24 RX ADMIN — AMIODARONE HYDROCHLORIDE 400 MILLIGRAM(S): 400 TABLET ORAL at 16:54

## 2022-10-24 RX ADMIN — LIDOCAINE 1 PATCH: 4 CREAM TOPICAL at 19:20

## 2022-10-24 RX ADMIN — BUMETANIDE 132 MILLIGRAM(S): 0.25 INJECTION INTRAMUSCULAR; INTRAVENOUS at 05:46

## 2022-10-24 RX ADMIN — Medication 50 MILLIGRAM(S): at 05:47

## 2022-10-24 RX ADMIN — AMIODARONE HYDROCHLORIDE 400 MILLIGRAM(S): 400 TABLET ORAL at 05:47

## 2022-10-24 RX ADMIN — INSULIN GLARGINE 12 UNIT(S): 100 INJECTION, SOLUTION SUBCUTANEOUS at 21:44

## 2022-10-24 RX ADMIN — Medication 5 MILLIGRAM(S): at 05:47

## 2022-10-24 RX ADMIN — HEPARIN SODIUM 14 UNIT(S)/HR: 5000 INJECTION INTRAVENOUS; SUBCUTANEOUS at 02:30

## 2022-10-24 RX ADMIN — Medication 325 MILLIGRAM(S): at 13:20

## 2022-10-24 RX ADMIN — ISOSORBIDE MONONITRATE 30 MILLIGRAM(S): 60 TABLET, EXTENDED RELEASE ORAL at 13:21

## 2022-10-24 RX ADMIN — HEPARIN SODIUM 11 UNIT(S)/HR: 5000 INJECTION INTRAVENOUS; SUBCUTANEOUS at 21:48

## 2022-10-24 RX ADMIN — PANTOPRAZOLE SODIUM 40 MILLIGRAM(S): 20 TABLET, DELAYED RELEASE ORAL at 05:54

## 2022-10-24 NOTE — PROGRESS NOTE ADULT - ASSESSMENT
The patient is a 73yo female with a PMH of severe pulmonary hypertension, chronic diastolic HF, AF (not on AC), HTN, mitral and tricuspid valve replacement who presents to the ED as a transfer from [facility] for atrial tachycardia.    Recommendations   - Continue Amiodarone   - Metoprolol 50mg daily   - Heparin drip for AC   - Patient's atrial tachycardia may be amenable to mapping   - Further recommendations to follow       Belle Driscoll MD  Cardiology Fellow     Recommendations are preliminary until cosigned by attending

## 2022-10-24 NOTE — PROGRESS NOTE ADULT - ASSESSMENT
71 y/o F with pmhx of COPD on home O2(3L) with severe pHTN, MVR/TVR , AF s/p ablation,  OHS/MIGUEL on home biPAP, HTN, HLD who presents with one week of vomiting with poor oral intake also complaining of SOB and chest pain.  She also states she has been taking prednisone on/off for the past 3 months for chest tightness and does endorse some weight gain.     In the ED pt found to have A Flutter and given Amio 400 po and Dig loaded.     #NEURO  - A and O x 4, maintain bedrest  - No complaints of pain    #PULM  - Currently on home 3-4 L NC, No exp wheezing   - Continue proair inhalation  - c/w prednisone since on chronically for 3 months    #CV  Hx of AF w RVR now w AFlutter  - Not on home AC  - obtain TTE, doesnt appear to be in cardiogenic shock but has pulm edema  - Continue home metoprolol and hydralazine  - responded UOP to metolazone & bumex  - switch to bumex drip 2     AFLutter  - Amio loaded  - holding digoxin   - EP following  - Continue Metoprolol    MVR/TVR  - echo technically difficult, unable to assess due to habitus and positioning  - may require ELIZABETH(?)    #GI  - Continue DASH CC diet      #RENAL  - Cr uptrending, now 3.01  - baseline ~1.6  - monitor while diuresing  - consider renal consult  - Renally dose digoxin    #ID  - NO WBC or fevers    #ENDO  - A1c 10.7  - FS ~100-130  - Continue home Lantus and premeal, adjust as appropriate    #HEME  - heparin GTT  - monitor PTT and adjust as appropriate      ETHICS/DISPO:  - Full code  - Continues to require CICU care, reassess in afternoon 73 y/o F with pmhx of COPD on home O2(3L) with severe pHTN, MVR/TVR , AF s/p ablation,  OHS/MIGUEL on home biPAP, HTN, HLD who presents with one week of vomiting with poor oral intake also complaining of SOB and chest pain.  She also states she has been taking prednisone on/off for the past 3 months for chest tightness and does endorse some weight gain.     In the ED pt found to have A Flutter and given Amio 400 po and Dig loaded.     #NEURO  - A and O x 4, maintain bedrest  - No complaints of pain    #PULM  - Currently on home 3-4 L NC, No exp wheezing but decreased airmovement throughout  - Continue proair inhalation  - c/w prednisone since on chronically for 3 months    #CV  Hx of AF w RVR now w AFlutter  - Not on home AC  - obtain TTE, doesnt appear to be in cardiogenic shock but has pulm edema  - Continue home metoprolol and hydralazine  - responded UOP to metolazone & bumex  - switch to bumex drip 2     AFLutter  - Amio loaded  - holding digoxin   - EP following  - Continue Metoprolol    MVR/TVR  - echo technically difficult, unable to assess due to habitus and positioning  - may require ELIZABETH(?)    #GI  - Continue DASH CC diet      #RENAL  - Cr uptrending, now 3.01  - baseline ~1.6  - monitor while diuresing  - consider renal consult  - Renally dose digoxin    #ID  - NO WBC or fevers    #ENDO  - A1c 10.7  - FS ~100-130  - Continue home Lantus and premeal, adjust as appropriate    #HEME  - heparin GTT  - monitor PTT and adjust as appropriate      ETHICS/DISPO:  - Full code  - Continues to require CICU care, reassess in afternoon 71 y/o F with pmhx of COPD on home O2(3L) with severe pHTN, MVR/TVR , AF s/p ablation,  OHS/MIGUEL on home biPAP, HTN, HLD who presents with one week of vomiting with poor oral intake also complaining of SOB and chest pain.  She also states she has been taking prednisone on/off for the past 3 months for chest tightness and does endorse some weight gain.     In the ED pt found to have A Flutter and given Amio 400 po and Dig loaded.     #NEURO  - A and O x 4, maintain bedrest  - No complaints of pain    #PULM  - Currently on home 3-4 L NC, No exp wheezing   - CXR likely fluid OL  - Continue proair inhalation  - c/w prednisone 5mg since on chronically for 3 months    #CV  Hx of AF w RVR now w AFlutter  - Not on home AC  - obtain TTE, doesnt appear to be in cardiogenic shock but has pulm edema  - Continue home metoprolol and hydralazine  - responded UOP to metolazone & bumex  - switch to bumex drip 2     AFLutter  - Amio loaded  - holding digoxin   - EP following  - Continue Metoprolol    MVR/TVR  - echo technically difficult, unable to assess due to habitus and positioning  - may require ELIZABETH(?) if needs further assessment    #GI  - Continue DASH CC diet      #RENAL  - Cr uptrending, now 3.01  - baseline ~1.6  - monitor while diuresing  - renal consult if Cr continues to uptrend & UOP declines    #ID  - NO WBC or fevers, no cough or purulent sputum  - monitor off Abbx for now    #ENDO  - A1c 10.7  - FS ~100-130  - Continue home Lantus and premeal, adjust as appropriate    #HEME  - heparin GTT  - monitor PTT and adjust as appropriate      ETHICS/DISPO:  - Full code    - DOWNGRADE to TELE 71 y/o F with pmhx of COPD on home O2(3L) with severe pHTN, MVR/TVR , AF s/p ablation,  OHS/MIGUEL on home biPAP, HTN, HLD who presents with one week of vomiting with poor oral intake also complaining of SOB and chest pain.  She also states she has been taking prednisone on/off for the past 3 months for chest tightness and does endorse some weight gain.     In the ED pt found to have A Flutter and given Amio 400 po and Dig loaded.     #NEURO  - A and O x 4, maintain bedrest  - No complaints of pain    #PULM  - Currently on home 3-4 L NC, No exp wheezing   - CXR likely fluid OL  - Continue proair inhalation  - c/w prednisone 5mg since on chronically for 3 months    #CV  Hx of AF w RVR now w AFlutter  - Not on home AC  - obtain TTE, doesnt appear to be in cardiogenic shock but has pulm edema  - Continue home metoprolol and hydralazine  - responded UOP to metolazone & bumex  - switch to bumex drip 2     AFLutter  - Amio loaded  - holding digoxin   - EP following  - Continue Metoprolol    MVR/TV replacement off AC  - echo technically difficult, unable to assess due to habitus and positioning  - per chart review under last name Nancy MRN [id number] - mechanical valve replacement 1999, was previously on coumadin but stopped in 09/2021 after worsening anemia, concern for GI bleed & epistaxis.   - continue with heparin GTT  - may require ELIZABETH(?) if needs further assessment    #GI  - Continue DASH CC diet      #RENAL  - Cr uptrending, now 3.01  - baseline ~1.6  - monitor while diuresing  - renal consult if Cr continues to uptrend & UOP declines    #ID  - NO WBC or fevers, no cough or purulent sputum  - monitor off Abbx for now    #ENDO  - A1c 10.7  - FS ~100-130  - Continue home Lantus and premeal, adjust as appropriate    #HEME  - heparin GTT  - monitor PTT and adjust as appropriate      ETHICS/DISPO:  - Full code    - DOWNGRADE to TELE 73 y/o F with pmhx of COPD on home O2(3L) with severe pHTN, MVR/TVR , AF s/p ablation,  OHS/MIGUEL on home biPAP, HTN, HLD who presents with one week of vomiting with poor oral intake also complaining of SOB and chest pain.  She also states she has been taking prednisone on/off for the past 3 months for chest tightness and does endorse some weight gain.     In the ED pt found to have A Flutter and given Amio 400 po and Dig loaded.     #NEURO  - A and O x 4, maintain bedrest  - No complaints of pain    #PULM  - Currently on home 3-4 L NC, No exp wheezing   - CXR likely fluid OL  - Continue proair inhalation  - c/w prednisone 5mg since on chronically for 3 months    #CV  Hx of AF w RVR now w AFlutter  - Not on home AC  - obtain TTE, doesnt appear to be in cardiogenic shock but has pulm edema  - Continue home metoprolol and hydralazine  - responded UOP to metolazone & bumex  - switch to bumex drip 2     AFLutter  - Amio loaded  - holding digoxin   - EP following  - Continue Metoprolol    MVR/TV replacement off AC  - echo technically difficult, unable to assess due to habitus and positioning  - per chart review under last name Nancy MRN [id number] - mechanical valve replacement 1999, was previously on coumadin but stopped in 09/2021 after worsening anemia, concern for GI bleed & epistaxis.   - continue with heparin GTT  - may require ELIZABETH(?) if needs further assessment    #GI  - Continue DASH CC diet      #RENAL  - Cr uptrending, now 3.01  - baseline ~1.6  - monitor while diuresing  - renal consult if Cr continues to uptrend & UOP declines    #ID  - NO WBC or fevers, no cough or purulent sputum  - monitor off Abbx for now    #ENDO  - A1c 10.7  - FS ~100-130  - Continue home Lantus and premeal, adjust as appropriate    #HEME  - heparin GTT  - monitor PTT and adjust as appropriate      ETHICS/DISPO:  - Full code  - dispo based on labs/UOP/Cr

## 2022-10-24 NOTE — PROGRESS NOTE ADULT - ATTENDING COMMENTS
History of MVR, unclear if bioprosthetic or mechanical  Presented with volume overload and aflutter with RVR  A+Ox3  Hemodynamics acceptable, no pressors or inotropes  O2 sats mid 90s on nasal cannula - wean to room air  DASH/diabetic diet  Non-oliguric MISAEL, volume up on exam - continue diuresis to target 1-2L overall negative  H/H low but acceptable on Heparin drip for afib/flutter   Afebrile, no antibiotics  Sugars controlled on Lantus  No central access History of MVR, unclear if bioprosthetic or mechanical  Presented with volume overload and aflutter with RVR  A+Ox3  Hemodynamics acceptable, in rate controlled aflutter, no pressors or inotropes - continue beta blocker  O2 sats mid 90s on nasal cannula - wean to room air  DASH/diabetic diet  Non-oliguric MISAEL, volume up on exam - continue diuresis to target 1-2L overall negative  H/H low but acceptable on Heparin drip for afib/flutter   Afebrile, no antibiotics  Sugars controlled on Lantus  No central access History of MVR, unclear if bioprosthetic or mechanical  Presented with volume overload and aflutter with RVR  A+Ox3  Hemodynamics acceptable, in rate controlled aflutter, no pressors or inotropes - continue beta blocker  has mechanical mitral valve placed over 20 years ago but has not been on systemic anticoagulation since 2021  Once better optimized may need ELIZABETH to assess valve as TTE has poor windows  O2 sats mid 90s on nasal cannula - wean to room air  DASH/diabetic diet  Non-oliguric MISAEL, volume up on exam - continue diuresis to target 1-2L overall negative  H/H low but acceptable on Heparin drip for afib/flutter (and mechanical MV)  Afebrile, no antibiotics  Sugars controlled on Lantus  No central access

## 2022-10-24 NOTE — PROGRESS NOTE ADULT - SUBJECTIVE AND OBJECTIVE BOX
Overnight Events: Telemetry with nicole, HR 85, intermittently tachycardic with maximum rate of 120s    Review Of Systems: No chest pain, mild shortness of breath, no palpitations            Current Meds:  ALBUTerol    90 MICROgram(s) HFA Inhaler 2 Puff(s) Inhalation every 6 hours PRN  aMIOdarone    Tablet 400 milliGRAM(s) Oral every 8 hours  aMIOdarone    Tablet   Oral   buMETAnide Infusion 2 mG/Hr IV Continuous <Continuous>  chlorhexidine 2% Cloths 1 Application(s) Topical <User Schedule>  ferrous    sulfate 325 milliGRAM(s) Oral daily  heparin  Infusion 1800 Unit(s)/Hr IV Continuous <Continuous>  insulin glargine Injectable (LANTUS) 12 Unit(s) SubCutaneous at bedtime  insulin lispro (ADMELOG) corrective regimen sliding scale   SubCutaneous three times a day before meals  insulin lispro (ADMELOG) corrective regimen sliding scale   SubCutaneous at bedtime  isosorbide   mononitrate ER Tablet (IMDUR) 30 milliGRAM(s) Oral daily  lidocaine   4% Patch 1 Patch Transdermal daily  metoprolol succinate ER 50 milliGRAM(s) Oral daily  pantoprazole    Tablet 40 milliGRAM(s) Oral before breakfast  predniSONE   Tablet 5 milliGRAM(s) Oral daily  simvastatin 10 milliGRAM(s) Oral at bedtime      Vitals:  T(F): 97.9 (10-), Max: 97.9 (10-)  HR: 112 (10-) (67 - 130)  BP: 135/83 (10-) (92/62 - 160/92)  RR: 25 (10-)  SpO2: 95% (10-24)  I&O's Summary    23 Oct 2022 07:01  -  24 Oct 2022 07:00  --------------------------------------------------------  IN: 1674 mL / OUT:  mL / NET: -376 mL    24 Oct 2022 07:  -  24 Oct 2022 10:56  --------------------------------------------------------  IN: 212 mL / OUT: 800 mL / NET: -588 mL        Physical Exam:  Appearance: NAD  Eyes: pink conjunctiva  HEENT: Normal oral mucosa  Cardiovascular: RRR, normal S1, S2, no murmurs, rubs, or gallops; trace LE edema; JVD not appreciable   Respiratory: No wheezing or significant crackles on exam   Gastrointestinal: soft, non-tender  Musculoskeletal: No clubbing; no joint deformity   Neurologic: Normal speech, no facial asymmetry  Psychiatry: AAOx3, mood & affect appropriate  Skin: No rashes, ecchymoses, or cyanosis of exposed skin                           10.2   7.11  )-----------( 324      ( 24 Oct 2022 01:32 )             33.7     10    135  |  88<L>  |  102<H>  ----------------------------<  133<H>  5.0   |  33<H>  |  3.01<H>    Ca    9.9      24 Oct 2022 01:36  Phos  5.0     10-24  Mg     2.5     10-24    TPro  8.2  /  Alb  4.2  /  TBili  0.5  /  DBili  x   /  AST  29  /  ALT  20  /  AlkPhos  116  10-24    PT/INR - ( 24 Oct 2022 01:33 )   PT: 13.9 sec;   INR: 1.20 ratio         PTT - ( 24 Oct 2022 01:33 )  PTT:123.6 sec  CARDIAC MARKERS ( 23 Oct 2022 00:25 )  61 ng/L / x     / x     / x     / x     / x          Serum Pro-Brain Natriuretic Peptide: 38609 pg/mL (10-24 @ 01:36)  Serum Pro-Brain Natriuretic Peptide: 46080 pg/mL (10-23 @ 00:25)  Serum Pro-Brain Natriuretic Peptide: 30709 pg/mL (10-22 @ 19:55)    Total Cholesterol: 187  LDL: --  HDL: 83  T       Overnight Events: Telemetry with nicole vs aflutter HR 85, intermittently tachycardic with maximum rate of 120s    Review Of Systems: No chest pain, mild shortness of breath, no palpitations            Current Meds:  ALBUTerol    90 MICROgram(s) HFA Inhaler 2 Puff(s) Inhalation every 6 hours PRN  aMIOdarone    Tablet 400 milliGRAM(s) Oral every 8 hours  aMIOdarone    Tablet   Oral   buMETAnide Infusion 2 mG/Hr IV Continuous <Continuous>  chlorhexidine 2% Cloths 1 Application(s) Topical <User Schedule>  ferrous    sulfate 325 milliGRAM(s) Oral daily  heparin  Infusion 1800 Unit(s)/Hr IV Continuous <Continuous>  insulin glargine Injectable (LANTUS) 12 Unit(s) SubCutaneous at bedtime  insulin lispro (ADMELOG) corrective regimen sliding scale   SubCutaneous three times a day before meals  insulin lispro (ADMELOG) corrective regimen sliding scale   SubCutaneous at bedtime  isosorbide   mononitrate ER Tablet (IMDUR) 30 milliGRAM(s) Oral daily  lidocaine   4% Patch 1 Patch Transdermal daily  metoprolol succinate ER 50 milliGRAM(s) Oral daily  pantoprazole    Tablet 40 milliGRAM(s) Oral before breakfast  predniSONE   Tablet 5 milliGRAM(s) Oral daily  simvastatin 10 milliGRAM(s) Oral at bedtime      Vitals:  T(F): 97.9 (10-), Max: 97.9 (10-23)  HR: 112 (10-) (67 - 130)  BP: 135/83 (10-) (92/62 - 160/92)  RR: 25 (10-)  SpO2: 95% (10-24)  I&O's Summary    23 Oct 2022 07:01  -  24 Oct 2022 07:00  --------------------------------------------------------  IN: 1674 mL / OUT:  mL / NET: -376 mL    24 Oct 2022 07:  -  24 Oct 2022 10:56  --------------------------------------------------------  IN: 212 mL / OUT: 800 mL / NET: -588 mL        Physical Exam:  Appearance: NAD  Eyes: pink conjunctiva  HEENT: Normal oral mucosa  Cardiovascular: RRR, normal S1, S2, no murmurs, rubs, or gallops; trace LE edema; JVD not appreciable   Respiratory: No wheezing or significant crackles on exam   Gastrointestinal: soft, non-tender  Musculoskeletal: No clubbing; no joint deformity   Neurologic: Normal speech, no facial asymmetry  Psychiatry: AAOx3, mood & affect appropriate  Skin: No rashes, ecchymoses, or cyanosis of exposed skin                           10.2   7.11  )-----------( 324      ( 24 Oct 2022 01:32 )             33.7     10    135  |  88<L>  |  102<H>  ----------------------------<  133<H>  5.0   |  33<H>  |  3.01<H>    Ca    9.9      24 Oct 2022 01:36  Phos  5.0     10-24  Mg     2.5     10-24    TPro  8.2  /  Alb  4.2  /  TBili  0.5  /  DBili  x   /  AST  29  /  ALT  20  /  AlkPhos  116  10    PT/INR - ( 24 Oct 2022 01:33 )   PT: 13.9 sec;   INR: 1.20 ratio         PTT - ( 24 Oct 2022 01:33 )  PTT:123.6 sec  CARDIAC MARKERS ( 23 Oct 2022 00:25 )  61 ng/L / x     / x     / x     / x     / x          Serum Pro-Brain Natriuretic Peptide: 40756 pg/mL (10-24 @ 01:36)  Serum Pro-Brain Natriuretic Peptide: 30259 pg/mL (10-23 @ 00:25)  Serum Pro-Brain Natriuretic Peptide: 97806 pg/mL (10-22 @ 19:55)    Total Cholesterol: 187  LDL: --  HDL: 83  T

## 2022-10-24 NOTE — CHART NOTE - NSCHARTNOTEFT_GEN_A_CORE
CCU Transfer Note    Transfer from: CCU  Transfer to:  (  ) Medicine    ( X ) Telemetry    (  ) RCU    (  ) Palliative    (  ) Stroke Unit    (  ) _______________    HPI: 71 y/o F with pmhx of COPD on home O2(3L) with severe pHTN, MVR/TVR , AF s/p ablation,  OHS/MIGUEL on home biPAP, HTN, HLD who presents with one week of vomiting with poor oral intake also complaining of SOB and chest pain.  She also states she has been taking prednisone on/off for the past 3 months for chest tightness and does endorse some weight gain.     In the ED pt found to have A Flutter and given Amio 400 po and Dig loaded.     CCU COURSE: In the CCU, pt was diuresed w/ Bumex gtt and metolazone. Dig held and EP following. On amio. Pt endorses decrease in chest pain. Pt on home O2 requirement of 3-4L.     MEDICATIONS:  STANDING MEDICATIONS  aMIOdarone    Tablet 400 milliGRAM(s) Oral every 8 hours  aMIOdarone    Tablet   Oral   buMETAnide Infusion 2 mG/Hr IV Continuous <Continuous>  ferrous    sulfate 325 milliGRAM(s) Oral daily  heparin  Infusion 1800 Unit(s)/Hr IV Continuous <Continuous>  insulin glargine Injectable (LANTUS) 12 Unit(s) SubCutaneous at bedtime  insulin lispro (ADMELOG) corrective regimen sliding scale   SubCutaneous three times a day before meals  insulin lispro (ADMELOG) corrective regimen sliding scale   SubCutaneous at bedtime  isosorbide   mononitrate ER Tablet (IMDUR) 30 milliGRAM(s) Oral daily  lidocaine   4% Patch 1 Patch Transdermal daily  metoprolol succinate ER 50 milliGRAM(s) Oral daily  pantoprazole    Tablet 40 milliGRAM(s) Oral before breakfast  predniSONE   Tablet 5 milliGRAM(s) Oral daily  simvastatin 10 milliGRAM(s) Oral at bedtime  sodium zirconium cyclosilicate 10 Gram(s) Oral every 8 hours    PRN MEDICATIONS  acetaminophen     Tablet .. 650 milliGRAM(s) Oral every 6 hours PRN  ALBUTerol    90 MICROgram(s) HFA Inhaler 2 Puff(s) Inhalation every 6 hours PRN  melatonin 3 milliGRAM(s) Oral at bedtime PRN      VITAL SIGNS: Last 24 Hours  T(C): 36.7 (24 Oct 2022 20:00), Max: 36.7 (24 Oct 2022 12:00)  T(F): 98 (24 Oct 2022 20:00), Max: 98.1 (24 Oct 2022 12:00)  HR: 126 (24 Oct 2022 21:00) (64 - 130)  BP: 140/100 (24 Oct 2022 21:00) (92/62 - 143/78)  BP(mean): 109 (24 Oct 2022 21:00) (70 - 109)  RR: 22 (24 Oct 2022 21:00) (15 - 67)  SpO2: 96% (24 Oct 2022 21:00) (86% - 100%)    LABS:                        10.2   7.11  )-----------( 324      ( 24 Oct 2022 01:32 )             33.7     10-24    133<L>  |  89<L>  |  106<H>  ----------------------------<  152<H>  5.6<H>   |  32<H>  |  2.96<H>    Ca    9.4      24 Oct 2022 15:11  Phos  4.7     10-24  Mg     2.3     10-24    TPro  7.8  /  Alb  3.8  /  TBili  0.4  /  DBili  x   /  AST  25  /  ALT  19  /  AlkPhos  104  10-24    PT/INR - ( 24 Oct 2022 20:56 )   PT: 12.9 sec;   INR: 1.12 ratio         PTT - ( 24 Oct 2022 20:56 )  PTT:76.0 sec            RADIOLOGY:      ASSESSMENT & PLAN:   · Assessment	  71 y/o F with pmhx of COPD on home O2(3L) with severe pHTN, MVR/TVR , AF s/p ablation,  OHS/MIGUEL on home biPAP, HTN, HLD who presents with one week of vomiting with poor oral intake also complaining of SOB and chest pain.  She also states she has been taking prednisone on/off for the past 3 months for chest tightness and does endorse some weight gain.     In the ED pt found to have A Flutter and given Amio 400 po and Dig loaded.     #NEURO  - A and O x 4, maintain bedrest  - No complaints of pain    #PULM  - Currently on home 3-4 L NC, No exp wheezing   - CXR likely fluid OL  - Continue proair inhalation  - c/w prednisone 5mg since on chronically for 3 months    #CV  Hx of AF w RVR now w AFlutter  - Not on home AC  - obtain TTE, doesnt appear to be in cardiogenic shock but has pulm edema  - Continue home metoprolol and hydralazine  - responded UOP to metolazone & bumex  - c/w bumex gtt @ 2     AFLutter  - Amio loaded  - holding digoxin   - EP following  - Continue Metoprolol    MVR/TV replacement off AC  - echo technically difficult, unable to assess due to habitus and positioning  - per chart review under last name Nancy MRN [id number] - mechanical valve replacement 1999, was previously on coumadin but stopped in 09/2021 after worsening anemia, concern for GI bleed & epistaxis.   - continue with heparin GTT  - may require ELIZABETH(?) if needs further assessment    #GI  - Continue DASH CC diet      #RENAL  - Cr uptrending, now 3.01  - baseline ~1.6  - monitor while diuresing  - renal consult if Cr continues to uptrend & UOP declines    #ID  - NO WBC or fevers, no cough or purulent sputum  - monitor off Abbx for now    #ENDO  - A1c 10.7  - FS ~100-130  - Continue home Lantus and premeal, adjust as appropriate    #HEME  - heparin GTT  - monitor PTT and adjust as appropriate      ETHICS/DISPO:  - Full code  - dispo based on labs/UOP/Cr         For Follow-Up:  [ ] CTM sCr - consider renal consult if continues to rise   [ ] PTT on heparin gtt   [ ] f/u EP recommendations

## 2022-10-24 NOTE — PROGRESS NOTE ADULT - SUBJECTIVE AND OBJECTIVE BOX
PATIENT:  DAMARI CIDID  [id number]    CHIEF COMPLAINT:  Patient is a 72y old  Female who presents with a chief complaint of Vomiting (23 Oct 2022 23:43)  73 y/o F with pmhx of COPD on home O2(3L) with severe pHTN, MVR/TVR , AF s/p ablation,  OHS/MIGUEL on home biPAP, HTN, HLD who presents with one week of vomiting with poor oral intake also complaining of SOB and chest pain.  She also states she has been taking prednisone on/off for the past 3 months for chest tightness and does endorse some weight gain. In the ED pt found to have A Flutter and given Amio 400 po and Dig loaded.     INTERVAL HISTORY/OVERNIGHT EVENTS:  no acute overnight events  given metolazone with good UOP  Echo non-interpretable  The patient was seen and examined at bedside.       MEDICATIONS:  MEDICATIONS  (STANDING):  aMIOdarone    Tablet 400 milliGRAM(s) Oral every 8 hours  aMIOdarone    Tablet   Oral   buMETAnide Infusion 2 mG/Hr (10 mL/Hr) IV Continuous <Continuous>  chlorhexidine 2% Cloths 1 Application(s) Topical <User Schedule>  ferrous    sulfate 325 milliGRAM(s) Oral daily  heparin  Infusion 1800 Unit(s)/Hr (14 mL/Hr) IV Continuous <Continuous>  insulin glargine Injectable (LANTUS) 12 Unit(s) SubCutaneous at bedtime  insulin lispro (ADMELOG) corrective regimen sliding scale   SubCutaneous three times a day before meals  insulin lispro (ADMELOG) corrective regimen sliding scale   SubCutaneous at bedtime  isosorbide   mononitrate ER Tablet (IMDUR) 30 milliGRAM(s) Oral daily  lidocaine   4% Patch 1 Patch Transdermal daily  metoprolol succinate ER 50 milliGRAM(s) Oral daily  pantoprazole    Tablet 40 milliGRAM(s) Oral before breakfast  predniSONE   Tablet 5 milliGRAM(s) Oral daily  simvastatin 10 milliGRAM(s) Oral at bedtime    MEDICATIONS  (PRN):  ALBUTerol    90 MICROgram(s) HFA Inhaler 2 Puff(s) Inhalation every 6 hours PRN Bronchospasm      ALLERGIES:  Allergies    No Known Allergies    Intolerances        OBJECTIVE:  ICU Vital Signs Last 24 Hrs  T(C): 36.5 (24 Oct 2022 03:00), Max: 36.7 (23 Oct 2022 08:00)  T(F): 97.7 (24 Oct 2022 03:00), Max: 98 (23 Oct 2022 08:00)  HR: 68 (24 Oct 2022 07:01) (68 - 130)  BP: 114/67 (24 Oct 2022 07:01) (92/62 - 160/92)  BP(mean): 83 (24 Oct 2022 07:01) (71 - 114)  ABP: --  ABP(mean): --  RR: 17 (24 Oct 2022 07:01) (15 - 36)  SpO2: 100% (24 Oct 2022 07:01) (94% - 100%)    O2 Parameters below as of 24 Oct 2022 03:00  Patient On (Oxygen Delivery Method): nasal cannula w/ humidification  O2 Flow (L/min): 4        Mode: AC/ CMV (Assist Control/ Continuous Mandatory Ventilation)  RR (machine): 16  TV (machine): 500  FiO2: 40  PEEP: 6  ITime: 1  MAP: 11  PIP: 20    Adult Advanced Hemodynamics Last 24 Hrs  CVP(mm Hg): --  CVP(cm H2O): --  CO: --  CI: --  PA: --  PA(mean): --  PCWP: --  SVR: --  SVRI: --  PVR: --  PVRI: --  CAPILLARY BLOOD GLUCOSE      POCT Blood Glucose.: 83 mg/dL (23 Oct 2022 22:28)  POCT Blood Glucose.: 130 mg/dL (23 Oct 2022 16:45)  POCT Blood Glucose.: 97 mg/dL (23 Oct 2022 08:27)    CAPILLARY BLOOD GLUCOSE      POCT Blood Glucose.: 83 mg/dL (23 Oct 2022 22:28)    I&O's Summary    23 Oct 2022 07:01  -  24 Oct 2022 07:00  --------------------------------------------------------  IN: 1674 mL / OUT: 2050 mL / NET: -376 mL      Daily     Daily Weight in k.9 (24 Oct 2022 05:00)    PHYSICAL EXAMINATION:  General: WN/WD NAD  HEENT: PERRLA, EOMI, moist mucous membranes  Neurology: A&Ox3, nonfocal, MARMOLEJO x 4  Respiratory: CTA B/L, normal respiratory effort, no wheezes, crackles, rales  CV: RRR, S1S2, no murmurs, rubs or gallops  Abdominal: Soft, NT, ND +BS, Last BM  Extremities: No edema, + peripheral pulses  Incisions:   Tubes:    LABS:                          10.2   7.11  )-----------( 324      ( 24 Oct 2022 01:32 )             33.7     10-24    135  |  88<L>  |  102<H>  ----------------------------<  133<H>  5.0   |  33<H>  |  3.01<H>    Ca    9.9      24 Oct 2022 01:36  Phos  5.0     10-  Mg     2.5     10-24    TPro  8.2  /  Alb  4.2  /  TBili  0.5  /  DBili  x   /  AST  29  /  ALT  20  /  AlkPhos  116  10-24    LIVER FUNCTIONS - ( 24 Oct 2022 01:36 )  Alb: 4.2 g/dL / Pro: 8.2 g/dL / ALK PHOS: 116 U/L / ALT: 20 U/L / AST: 29 U/L / GGT: x           PT/INR - ( 24 Oct 2022 01:33 )   PT: 13.9 sec;   INR: 1.20 ratio         PTT - ( 24 Oct 2022 01:33 )  PTT:123.6 sec    Echo:   TTE with Doppler (w/Cont) (22)  1. Mechanical prosthetic mitral valve replacement. Minimal mitral regurgitation. Mean transmitral valve gradient equals 6 mm Hg, which is elevated even in the setting of a prosthetic valve.  2. Endocardium not well visualized; grossly normal left ventricular systolic function. Endocardial visualization enhanced with intravenous injection of echo contrast (Definity). Septal consistent with right ventricular overload.  3. Right ventricular enlargement with decreased right ventricular systolic function.   PATIENT:  DAMARI CIDID  [id number]    CHIEF COMPLAINT:  Patient is a 72y old  Female who presents with a chief complaint of Vomiting (23 Oct 2022 23:43)  71 y/o F with pmhx of COPD on home O2(3L) with severe pHTN, MVR/TVR , AF s/p ablation,  OHS/MIGUEL on home biPAP, HTN, HLD who presents with one week of vomiting with poor oral intake also complaining of SOB and chest pain.  She also states she has been taking prednisone on/off for the past 3 months for chest tightness and does endorse some weight gain. In the ED pt found to have A Flutter and given Amio 400 po and Dig loaded.     INTERVAL HISTORY/OVERNIGHT EVENTS:  no acute overnight events  patient feels improved in terms of breathing but not at baseline  given metolazone with good UOP  Echo non-interpretable  The patient was seen and examined at bedside.       MEDICATIONS:  MEDICATIONS  (STANDING):  aMIOdarone    Tablet 400 milliGRAM(s) Oral every 8 hours  aMIOdarone    Tablet   Oral   buMETAnide Infusion 2 mG/Hr (10 mL/Hr) IV Continuous <Continuous>  chlorhexidine 2% Cloths 1 Application(s) Topical <User Schedule>  ferrous    sulfate 325 milliGRAM(s) Oral daily  heparin  Infusion 1800 Unit(s)/Hr (14 mL/Hr) IV Continuous <Continuous>  insulin glargine Injectable (LANTUS) 12 Unit(s) SubCutaneous at bedtime  insulin lispro (ADMELOG) corrective regimen sliding scale   SubCutaneous three times a day before meals  insulin lispro (ADMELOG) corrective regimen sliding scale   SubCutaneous at bedtime  isosorbide   mononitrate ER Tablet (IMDUR) 30 milliGRAM(s) Oral daily  lidocaine   4% Patch 1 Patch Transdermal daily  metoprolol succinate ER 50 milliGRAM(s) Oral daily  pantoprazole    Tablet 40 milliGRAM(s) Oral before breakfast  predniSONE   Tablet 5 milliGRAM(s) Oral daily  simvastatin 10 milliGRAM(s) Oral at bedtime    MEDICATIONS  (PRN):  ALBUTerol    90 MICROgram(s) HFA Inhaler 2 Puff(s) Inhalation every 6 hours PRN Bronchospasm      ALLERGIES:  Allergies    No Known Allergies    Intolerances        OBJECTIVE:  ICU Vital Signs Last 24 Hrs  T(C): 36.5 (24 Oct 2022 03:00), Max: 36.7 (23 Oct 2022 08:00)  T(F): 97.7 (24 Oct 2022 03:00), Max: 98 (23 Oct 2022 08:00)  HR: 68 (24 Oct 2022 07:01) (68 - 130)  BP: 114/67 (24 Oct 2022 07:01) (92/62 - 160/92)  BP(mean): 83 (24 Oct 2022 07:) (71 - 114)  ABP: --  ABP(mean): --  RR: 17 (24 Oct 2022 07:01) (15 - 36)  SpO2: 100% (24 Oct 2022 07:01) (94% - 100%)    O2 Parameters below as of 24 Oct 2022 03:00  Patient On (Oxygen Delivery Method): nasal cannula w/ humidification  O2 Flow (L/min): 4        Mode: AC/ CMV (Assist Control/ Continuous Mandatory Ventilation)  RR (machine): 16  TV (machine): 500  FiO2: 40  PEEP: 6  ITime: 1  MAP: 11  PIP: 20    Adult Advanced Hemodynamics Last 24 Hrs  CVP(mm Hg): --  CVP(cm H2O): --  CO: --  CI: --  PA: --  PA(mean): --  PCWP: --  SVR: --  SVRI: --  PVR: --  PVRI: --  CAPILLARY BLOOD GLUCOSE      POCT Blood Glucose.: 83 mg/dL (23 Oct 2022 22:28)  POCT Blood Glucose.: 130 mg/dL (23 Oct 2022 16:45)  POCT Blood Glucose.: 97 mg/dL (23 Oct 2022 08:27)    CAPILLARY BLOOD GLUCOSE      POCT Blood Glucose.: 83 mg/dL (23 Oct 2022 22:28)    I&O's Summary    23 Oct 2022 07:01  -  24 Oct 2022 07:00  --------------------------------------------------------  IN: 1674 mL / OUT: 2050 mL / NET: -376 mL      Daily     Daily Weight in k.9 (24 Oct 2022 05:00)    Constitutional:  See HPI  ENMT: No neck pain or stiffness  Cardiac:  No chest pain  Respiratory:  +respiratory distress; +VILLA  GI:  No nausea, vomiting, diarrhea or abdominal pain.  :  No urinary complaints, +sarmiento  MS:  No back pain. +LE edema  Neuro:  No headache   Except as documented in the HPI,  all other systems are negative     PHYSICAL EXAMINATION:  General: WN/WD NAD  HEENT: PERRLA, EOMI, moist mucous membranes  Neurology: A&Ox3, nonfocal, MARMOLEJO x 4  Respiratory: poor air movement throughout, +crackles in bases but diminished throughout. on 3-4L NC  CV: RRR, S1S2, no murmurs, rubs or gallops  Abdominal: Soft, NT, ND +BS  Extremities: + b/l ankle edema, + peripheral pulses    Incisions:   Tubes: sarmiento    LABS:                          10.2   7.11  )-----------( 324      ( 24 Oct 2022 01:32 )             33.7     10-24    135  |  88<L>  |  102<H>  ----------------------------<  133<H>  5.0   |  33<H>  |  3.01<H>    Ca    9.9      24 Oct 2022 01:36  Phos  5.0     10-  Mg     2.5     10-24    TPro  8.2  /  Alb  4.2  /  TBili  0.5  /  DBili  x   /  AST  29  /  ALT  20  /  AlkPhos  116  10-24    LIVER FUNCTIONS - ( 24 Oct 2022 01:36 )  Alb: 4.2 g/dL / Pro: 8.2 g/dL / ALK PHOS: 116 U/L / ALT: 20 U/L / AST: 29 U/L / GGT: x           PT/INR - ( 24 Oct 2022 01:33 )   PT: 13.9 sec;   INR: 1.20 ratio         PTT - ( 24 Oct 2022 01:33 )  PTT:123.6 sec    Echo:   TTE with Doppler (w/Cont) (22)  1. Mechanical prosthetic mitral valve replacement. Minimal mitral regurgitation. Mean transmitral valve gradient equals 6 mm Hg, which is elevated even in the setting of a prosthetic valve.  2. Endocardium not well visualized; grossly normal left ventricular systolic function. Endocardial visualization enhanced with intravenous injection of echo contrast (Definity). Septal consistent with right ventricular overload.  3. Right ventricular enlargement with decreased right ventricular systolic function.   PATIENT:  DAMARI CIDID  [id number]    CHIEF COMPLAINT:  Patient is a 72y old  Female who presents with a chief complaint of Vomiting (23 Oct 2022 23:43)  73 y/o F with pmhx of COPD on home O2(3L) with severe pHTN, MVR/TVR , AF s/p ablation,  OHS/MIGUEL on home biPAP, HTN, HLD who presents with one week of vomiting with poor oral intake also complaining of SOB and chest pain.  She also states she has been taking prednisone on/off for the past 3 months for chest tightness and does endorse some weight gain. In the ED pt found to have A Flutter and given Amio 400 po and Dig loaded.     INTERVAL HISTORY/OVERNIGHT EVENTS:  no acute overnight events  patient feels improved in terms of breathing - does have very poor dyspnea at baseline  given metolazone with good UOP overnight  Echo non-interpretable  The patient was seen and examined at bedside.       MEDICATIONS:  MEDICATIONS  (STANDING):  aMIOdarone    Tablet 400 milliGRAM(s) Oral every 8 hours  aMIOdarone    Tablet   Oral   buMETAnide Infusion 2 mG/Hr (10 mL/Hr) IV Continuous <Continuous>  chlorhexidine 2% Cloths 1 Application(s) Topical <User Schedule>  ferrous    sulfate 325 milliGRAM(s) Oral daily  heparin  Infusion 1800 Unit(s)/Hr (14 mL/Hr) IV Continuous <Continuous>  insulin glargine Injectable (LANTUS) 12 Unit(s) SubCutaneous at bedtime  insulin lispro (ADMELOG) corrective regimen sliding scale   SubCutaneous three times a day before meals  insulin lispro (ADMELOG) corrective regimen sliding scale   SubCutaneous at bedtime  isosorbide   mononitrate ER Tablet (IMDUR) 30 milliGRAM(s) Oral daily  lidocaine   4% Patch 1 Patch Transdermal daily  metoprolol succinate ER 50 milliGRAM(s) Oral daily  pantoprazole    Tablet 40 milliGRAM(s) Oral before breakfast  predniSONE   Tablet 5 milliGRAM(s) Oral daily  simvastatin 10 milliGRAM(s) Oral at bedtime    MEDICATIONS  (PRN):  ALBUTerol    90 MICROgram(s) HFA Inhaler 2 Puff(s) Inhalation every 6 hours PRN Bronchospasm      ALLERGIES:  Allergies    No Known Allergies    Intolerances        OBJECTIVE:  ICU Vital Signs Last 24 Hrs  T(C): 36.5 (24 Oct 2022 03:00), Max: 36.7 (23 Oct 2022 08:00)  T(F): 97.7 (24 Oct 2022 03:00), Max: 98 (23 Oct 2022 08:00)  HR: 68 (24 Oct 2022 07:01) (68 - 130)  BP: 114/67 (24 Oct 2022 07:01) (92/62 - 160/92)  BP(mean): 83 (24 Oct 2022 07:01) (71 - 114)  ABP: --  ABP(mean): --  RR: 17 (24 Oct 2022 07:01) (15 - 36)  SpO2: 100% (24 Oct 2022 07:01) (94% - 100%)    O2 Parameters below as of 24 Oct 2022 03:00  Patient On (Oxygen Delivery Method): nasal cannula w/ humidification  O2 Flow (L/min): 4        Mode: AC/ CMV (Assist Control/ Continuous Mandatory Ventilation)  RR (machine): 16  TV (machine): 500  FiO2: 40  PEEP: 6  ITime: 1  MAP: 11  PIP: 20    Adult Advanced Hemodynamics Last 24 Hrs  CVP(mm Hg): --  CVP(cm H2O): --  CO: --  CI: --  PA: --  PA(mean): --  PCWP: --  SVR: --  SVRI: --  PVR: --  PVRI: --  CAPILLARY BLOOD GLUCOSE      POCT Blood Glucose.: 83 mg/dL (23 Oct 2022 22:28)  POCT Blood Glucose.: 130 mg/dL (23 Oct 2022 16:45)  POCT Blood Glucose.: 97 mg/dL (23 Oct 2022 08:27)    CAPILLARY BLOOD GLUCOSE      POCT Blood Glucose.: 83 mg/dL (23 Oct 2022 22:28)    I&O's Summary    23 Oct 2022 07:01  -  24 Oct 2022 07:00  --------------------------------------------------------  IN: 1674 mL / OUT: 2050 mL / NET: -376 mL      Daily     Daily Weight in k.9 (24 Oct 2022 05:00)    Constitutional:  See HPI  ENMT: No neck pain or stiffness  Cardiac:  No chest pain  Respiratory:  +respiratory distress; +VILLA  GI:  No nausea, vomiting, diarrhea or abdominal pain.  :  No urinary complaints, +sarmiento  MS:  No back pain. +LE edema  Neuro:  No headache   Except as documented in the HPI,  all other systems are negative     PHYSICAL EXAMINATION:  General: WN/WD NAD  HEENT: PERRLA, EOMI, moist mucous membranes  Neurology: A&Ox3, nonfocal, MARMOLEJO x 4  Respiratory: +crackles in bases and generally diminished throughout. on 3-4L NC  CV: RRR, S1S2, no murmurs, rubs or gallops  Abdominal: Soft, NT, ND +BS  Extremities: + b/l ankle edema, + peripheral pulses    Incisions:   Tubes: sarmiento    LABS:                          10.2   7.11  )-----------( 324      ( 24 Oct 2022 01:32 )             33.7     10-24    135  |  88<L>  |  102<H>  ----------------------------<  133<H>  5.0   |  33<H>  |  3.01<H>    Ca    9.9      24 Oct 2022 01:36  Phos  5.0     10-  Mg     2.5     10-24    TPro  8.2  /  Alb  4.2  /  TBili  0.5  /  DBili  x   /  AST  29  /  ALT  20  /  AlkPhos  116  10-24    LIVER FUNCTIONS - ( 24 Oct 2022 01:36 )  Alb: 4.2 g/dL / Pro: 8.2 g/dL / ALK PHOS: 116 U/L / ALT: 20 U/L / AST: 29 U/L / GGT: x           PT/INR - ( 24 Oct 2022 01:33 )   PT: 13.9 sec;   INR: 1.20 ratio         PTT - ( 24 Oct 2022 01:33 )  PTT:123.6 sec    Echo:   TTE with Doppler (w/Cont) (22)  1. Mechanical prosthetic mitral valve replacement. Minimal mitral regurgitation. Mean transmitral valve gradient equals 6 mm Hg, which is elevated even in the setting of a prosthetic valve.  2. Endocardium not well visualized; grossly normal left ventricular systolic function. Endocardial visualization enhanced with intravenous injection of echo contrast (Definity). Septal consistent with right ventricular overload.  3. Right ventricular enlargement with decreased right ventricular systolic function.   PATIENT:  DAMARI CIDID  [id number]    CHIEF COMPLAINT:  Patient is a 72y old  Female who presents with a chief complaint of Vomiting (23 Oct 2022 23:43)  73 y/o F with pmhx of COPD on home O2(3L) with severe pHTN, mechanical MV/TV replacement () off AC since 2021 due to bleeding (not candidate for GI scope, so taken off AC) , AF s/p ablation off AC,  OHS/MIGUEL on home biPAP, HTN, HLD who presents with one week of vomiting with poor oral intake also complaining of SOB and chest pain.  She also states she has been taking prednisone on/off for the past 3 months for chest tightness and does endorse some weight gain. In the ED pt found to have A Flutter and given Amio 400 po and Dig loaded.     INTERVAL HISTORY/OVERNIGHT EVENTS:  no acute overnight events  patient feels improved in terms of breathing - does have very poor dyspnea at baseline  given metolazone with good UOP overnight  Echo non-interpretable  The patient was seen and examined at bedside.       MEDICATIONS:  MEDICATIONS  (STANDING):  aMIOdarone    Tablet 400 milliGRAM(s) Oral every 8 hours  aMIOdarone    Tablet   Oral   buMETAnide Infusion 2 mG/Hr (10 mL/Hr) IV Continuous <Continuous>  chlorhexidine 2% Cloths 1 Application(s) Topical <User Schedule>  ferrous    sulfate 325 milliGRAM(s) Oral daily  heparin  Infusion 1800 Unit(s)/Hr (14 mL/Hr) IV Continuous <Continuous>  insulin glargine Injectable (LANTUS) 12 Unit(s) SubCutaneous at bedtime  insulin lispro (ADMELOG) corrective regimen sliding scale   SubCutaneous three times a day before meals  insulin lispro (ADMELOG) corrective regimen sliding scale   SubCutaneous at bedtime  isosorbide   mononitrate ER Tablet (IMDUR) 30 milliGRAM(s) Oral daily  lidocaine   4% Patch 1 Patch Transdermal daily  metoprolol succinate ER 50 milliGRAM(s) Oral daily  pantoprazole    Tablet 40 milliGRAM(s) Oral before breakfast  predniSONE   Tablet 5 milliGRAM(s) Oral daily  simvastatin 10 milliGRAM(s) Oral at bedtime    MEDICATIONS  (PRN):  ALBUTerol    90 MICROgram(s) HFA Inhaler 2 Puff(s) Inhalation every 6 hours PRN Bronchospasm      ALLERGIES:  Allergies    No Known Allergies    Intolerances        OBJECTIVE:  ICU Vital Signs Last 24 Hrs  T(C): 36.5 (24 Oct 2022 03:00), Max: 36.7 (23 Oct 2022 08:00)  T(F): 97.7 (24 Oct 2022 03:00), Max: 98 (23 Oct 2022 08:00)  HR: 68 (24 Oct 2022 07:01) (68 - 130)  BP: 114/67 (24 Oct 2022 07:01) (92/62 - 160/92)  BP(mean): 83 (24 Oct 2022 07:) (71 - 114)  ABP: --  ABP(mean): --  RR: 17 (24 Oct 2022 07:01) (15 - 36)  SpO2: 100% (24 Oct 2022 07:) (94% - 100%)    O2 Parameters below as of 24 Oct 2022 03:00  Patient On (Oxygen Delivery Method): nasal cannula w/ humidification  O2 Flow (L/min): 4        Mode: AC/ CMV (Assist Control/ Continuous Mandatory Ventilation)  RR (machine): 16  TV (machine): 500  FiO2: 40  PEEP: 6  ITime: 1  MAP: 11  PIP: 20    Adult Advanced Hemodynamics Last 24 Hrs  CVP(mm Hg): --  CVP(cm H2O): --  CO: --  CI: --  PA: --  PA(mean): --  PCWP: --  SVR: --  SVRI: --  PVR: --  PVRI: --  CAPILLARY BLOOD GLUCOSE      POCT Blood Glucose.: 83 mg/dL (23 Oct 2022 22:28)  POCT Blood Glucose.: 130 mg/dL (23 Oct 2022 16:45)  POCT Blood Glucose.: 97 mg/dL (23 Oct 2022 08:27)    CAPILLARY BLOOD GLUCOSE      POCT Blood Glucose.: 83 mg/dL (23 Oct 2022 22:28)    I&O's Summary    23 Oct 2022 07:01  -  24 Oct 2022 07:00  --------------------------------------------------------  IN: 1674 mL / OUT:  mL / NET: -376 mL      Daily     Daily Weight in k.9 (24 Oct 2022 05:00)    Constitutional:  See HPI  ENMT: No neck pain or stiffness  Cardiac:  No chest pain  Respiratory:  +respiratory distress; +VILLA  GI:  No nausea, vomiting, diarrhea or abdominal pain.  :  No urinary complaints, +sarmiento  MS:  No back pain. +LE edema  Neuro:  No headache   Except as documented in the HPI,  all other systems are negative     PHYSICAL EXAMINATION:  General: WN/WD NAD  HEENT: PERRLA, EOMI, moist mucous membranes  Neurology: A&Ox3, nonfocal, MARMOLEJO x 4  Respiratory: +crackles in bases and generally diminished throughout. on 3-4L NC  CV: RRR, S1S2, no murmurs, rubs or gallops  Abdominal: Soft, NT, ND +BS  Extremities: + b/l ankle edema, + peripheral pulses    Incisions:   Tubes: sarmiento    LABS:                          10.2   7.11  )-----------( 324      ( 24 Oct 2022 01:32 )             33.7     10-    135  |  88<L>  |  102<H>  ----------------------------<  133<H>  5.0   |  33<H>  |  3.01<H>    Ca    9.9      24 Oct 2022 01:36  Phos  5.0     10-  Mg     2.5     10-    TPro  8.2  /  Alb  4.2  /  TBili  0.5  /  DBili  x   /  AST  29  /  ALT  20  /  AlkPhos  116  10-24    LIVER FUNCTIONS - ( 24 Oct 2022 01:36 )  Alb: 4.2 g/dL / Pro: 8.2 g/dL / ALK PHOS: 116 U/L / ALT: 20 U/L / AST: 29 U/L / GGT: x           PT/INR - ( 24 Oct 2022 01:33 )   PT: 13.9 sec;   INR: 1.20 ratio         PTT - ( 24 Oct 2022 01:33 )  PTT:123.6 sec    Echo:   TTE with Doppler (w/Cont) (22)  1. Mechanical prosthetic mitral valve replacement. Minimal mitral regurgitation. Mean transmitral valve gradient equals 6 mm Hg, which is elevated even in the setting of a prosthetic valve.  2. Endocardium not well visualized; grossly normal left ventricular systolic function. Endocardial visualization enhanced with intravenous injection of echo contrast (Definity). Septal consistent with right ventricular overload.  3. Right ventricular enlargement with decreased right ventricular systolic function.

## 2022-10-25 LAB
A1C WITH ESTIMATED AVERAGE GLUCOSE RESULT: 6.6 % — HIGH (ref 4–5.6)
ALBUMIN SERPL ELPH-MCNC: 3.8 G/DL — SIGNIFICANT CHANGE UP (ref 3.3–5)
ALP SERPL-CCNC: 103 U/L — SIGNIFICANT CHANGE UP (ref 40–120)
ALT FLD-CCNC: 19 U/L — SIGNIFICANT CHANGE UP (ref 10–45)
ANION GAP SERPL CALC-SCNC: 11 MMOL/L — SIGNIFICANT CHANGE UP (ref 5–17)
APTT BLD: 65.3 SEC — HIGH (ref 27.5–35.5)
AST SERPL-CCNC: 23 U/L — SIGNIFICANT CHANGE UP (ref 10–40)
BASOPHILS # BLD AUTO: 0.02 K/UL — SIGNIFICANT CHANGE UP (ref 0–0.2)
BASOPHILS NFR BLD AUTO: 0.3 % — SIGNIFICANT CHANGE UP (ref 0–2)
BILIRUB SERPL-MCNC: 0.4 MG/DL — SIGNIFICANT CHANGE UP (ref 0.2–1.2)
BUN SERPL-MCNC: 110 MG/DL — HIGH (ref 7–23)
CALCIUM SERPL-MCNC: 9.2 MG/DL — SIGNIFICANT CHANGE UP (ref 8.4–10.5)
CHLORIDE SERPL-SCNC: 88 MMOL/L — LOW (ref 96–108)
CO2 SERPL-SCNC: 36 MMOL/L — HIGH (ref 22–31)
CREAT SERPL-MCNC: 3.02 MG/DL — HIGH (ref 0.5–1.3)
EGFR: 16 ML/MIN/1.73M2 — LOW
EOSINOPHIL # BLD AUTO: 0.14 K/UL — SIGNIFICANT CHANGE UP (ref 0–0.5)
EOSINOPHIL NFR BLD AUTO: 2.4 % — SIGNIFICANT CHANGE UP (ref 0–6)
ESTIMATED AVERAGE GLUCOSE: 143 MG/DL — HIGH (ref 68–114)
GLUCOSE BLDC GLUCOMTR-MCNC: 109 MG/DL — HIGH (ref 70–99)
GLUCOSE BLDC GLUCOMTR-MCNC: 117 MG/DL — HIGH (ref 70–99)
GLUCOSE BLDC GLUCOMTR-MCNC: 119 MG/DL — HIGH (ref 70–99)
GLUCOSE BLDC GLUCOMTR-MCNC: 123 MG/DL — HIGH (ref 70–99)
GLUCOSE SERPL-MCNC: 126 MG/DL — HIGH (ref 70–99)
HCT VFR BLD CALC: 34 % — LOW (ref 34.5–45)
HGB BLD-MCNC: 10 G/DL — LOW (ref 11.5–15.5)
IMM GRANULOCYTES NFR BLD AUTO: 1 % — HIGH (ref 0–0.9)
LYMPHOCYTES # BLD AUTO: 1.03 K/UL — SIGNIFICANT CHANGE UP (ref 1–3.3)
LYMPHOCYTES # BLD AUTO: 17.4 % — SIGNIFICANT CHANGE UP (ref 13–44)
MAGNESIUM SERPL-MCNC: 2.3 MG/DL — SIGNIFICANT CHANGE UP (ref 1.6–2.6)
MCHC RBC-ENTMCNC: 29.4 GM/DL — LOW (ref 32–36)
MCHC RBC-ENTMCNC: 29.8 PG — SIGNIFICANT CHANGE UP (ref 27–34)
MCV RBC AUTO: 101.2 FL — HIGH (ref 80–100)
MONOCYTES # BLD AUTO: 0.65 K/UL — SIGNIFICANT CHANGE UP (ref 0–0.9)
MONOCYTES NFR BLD AUTO: 11 % — SIGNIFICANT CHANGE UP (ref 2–14)
NEUTROPHILS # BLD AUTO: 4.03 K/UL — SIGNIFICANT CHANGE UP (ref 1.8–7.4)
NEUTROPHILS NFR BLD AUTO: 67.9 % — SIGNIFICANT CHANGE UP (ref 43–77)
NRBC # BLD: 0 /100 WBCS — SIGNIFICANT CHANGE UP (ref 0–0)
PHOSPHATE SERPL-MCNC: 4.6 MG/DL — HIGH (ref 2.5–4.5)
PLATELET # BLD AUTO: 322 K/UL — SIGNIFICANT CHANGE UP (ref 150–400)
POTASSIUM SERPL-MCNC: 4.4 MMOL/L — SIGNIFICANT CHANGE UP (ref 3.5–5.3)
POTASSIUM SERPL-SCNC: 4.4 MMOL/L — SIGNIFICANT CHANGE UP (ref 3.5–5.3)
PROT SERPL-MCNC: 7.8 G/DL — SIGNIFICANT CHANGE UP (ref 6–8.3)
RBC # BLD: 3.36 M/UL — LOW (ref 3.8–5.2)
RBC # FLD: 15.2 % — HIGH (ref 10.3–14.5)
SODIUM SERPL-SCNC: 135 MMOL/L — SIGNIFICANT CHANGE UP (ref 135–145)
WBC # BLD: 5.93 K/UL — SIGNIFICANT CHANGE UP (ref 3.8–10.5)
WBC # FLD AUTO: 5.93 K/UL — SIGNIFICANT CHANGE UP (ref 3.8–10.5)

## 2022-10-25 PROCEDURE — 99223 1ST HOSP IP/OBS HIGH 75: CPT

## 2022-10-25 RX ORDER — FUROSEMIDE 40 MG
80 TABLET ORAL ONCE
Refills: 0 | Status: DISCONTINUED | OUTPATIENT
Start: 2022-10-25 | End: 2022-10-25

## 2022-10-25 RX ORDER — POLYETHYLENE GLYCOL 3350 17 G/17G
17 POWDER, FOR SOLUTION ORAL DAILY
Refills: 0 | Status: DISCONTINUED | OUTPATIENT
Start: 2022-10-25 | End: 2022-11-04

## 2022-10-25 RX ORDER — ACETAMINOPHEN 500 MG
1000 TABLET ORAL ONCE
Refills: 0 | Status: COMPLETED | OUTPATIENT
Start: 2022-10-25 | End: 2022-10-25

## 2022-10-25 RX ORDER — BUMETANIDE 0.25 MG/ML
1 INJECTION INTRAMUSCULAR; INTRAVENOUS
Qty: 20 | Refills: 0 | Status: DISCONTINUED | OUTPATIENT
Start: 2022-10-25 | End: 2022-10-26

## 2022-10-25 RX ORDER — ONDANSETRON 8 MG/1
4 TABLET, FILM COATED ORAL ONCE
Refills: 0 | Status: COMPLETED | OUTPATIENT
Start: 2022-10-25 | End: 2022-10-25

## 2022-10-25 RX ORDER — SENNA PLUS 8.6 MG/1
2 TABLET ORAL AT BEDTIME
Refills: 0 | Status: DISCONTINUED | OUTPATIENT
Start: 2022-10-25 | End: 2022-11-04

## 2022-10-25 RX ADMIN — ISOSORBIDE MONONITRATE 30 MILLIGRAM(S): 60 TABLET, EXTENDED RELEASE ORAL at 13:24

## 2022-10-25 RX ADMIN — Medication 5 MILLIGRAM(S): at 05:24

## 2022-10-25 RX ADMIN — Medication 1000 MILLIGRAM(S): at 23:10

## 2022-10-25 RX ADMIN — SODIUM ZIRCONIUM CYCLOSILICATE 10 GRAM(S): 10 POWDER, FOR SUSPENSION ORAL at 01:19

## 2022-10-25 RX ADMIN — Medication 400 MILLIGRAM(S): at 02:26

## 2022-10-25 RX ADMIN — PANTOPRAZOLE SODIUM 40 MILLIGRAM(S): 20 TABLET, DELAYED RELEASE ORAL at 06:01

## 2022-10-25 RX ADMIN — SIMVASTATIN 10 MILLIGRAM(S): 20 TABLET, FILM COATED ORAL at 21:15

## 2022-10-25 RX ADMIN — POLYETHYLENE GLYCOL 3350 17 GRAM(S): 17 POWDER, FOR SOLUTION ORAL at 18:28

## 2022-10-25 RX ADMIN — LIDOCAINE 1 PATCH: 4 CREAM TOPICAL at 19:30

## 2022-10-25 RX ADMIN — ONDANSETRON 4 MILLIGRAM(S): 8 TABLET, FILM COATED ORAL at 01:50

## 2022-10-25 RX ADMIN — AMIODARONE HYDROCHLORIDE 400 MILLIGRAM(S): 400 TABLET ORAL at 13:24

## 2022-10-25 RX ADMIN — INSULIN GLARGINE 12 UNIT(S): 100 INJECTION, SOLUTION SUBCUTANEOUS at 21:15

## 2022-10-25 RX ADMIN — AMIODARONE HYDROCHLORIDE 400 MILLIGRAM(S): 400 TABLET ORAL at 05:24

## 2022-10-25 RX ADMIN — AMIODARONE HYDROCHLORIDE 400 MILLIGRAM(S): 400 TABLET ORAL at 21:14

## 2022-10-25 RX ADMIN — Medication 50 MILLIGRAM(S): at 05:24

## 2022-10-25 RX ADMIN — BUMETANIDE 5 MG/HR: 0.25 INJECTION INTRAMUSCULAR; INTRAVENOUS at 10:16

## 2022-10-25 RX ADMIN — Medication 1000 MILLIGRAM(S): at 03:50

## 2022-10-25 RX ADMIN — SENNA PLUS 2 TABLET(S): 8.6 TABLET ORAL at 21:13

## 2022-10-25 RX ADMIN — LIDOCAINE 1 PATCH: 4 CREAM TOPICAL at 09:36

## 2022-10-25 RX ADMIN — Medication 325 MILLIGRAM(S): at 13:24

## 2022-10-25 RX ADMIN — Medication 400 MILLIGRAM(S): at 22:12

## 2022-10-25 RX ADMIN — LIDOCAINE 1 PATCH: 4 CREAM TOPICAL at 13:23

## 2022-10-25 RX ADMIN — HEPARIN SODIUM 11 UNIT(S)/HR: 5000 INJECTION INTRAVENOUS; SUBCUTANEOUS at 05:19

## 2022-10-25 NOTE — PROGRESS NOTE ADULT - SUBJECTIVE AND OBJECTIVE BOX
Patient seen and examined at bedside.    Overnight Events:     Review Of Systems: No chest pain, shortness of breath, or palpitations            Current Meds:  acetaminophen     Tablet .. 650 milliGRAM(s) Oral every 6 hours PRN  ALBUTerol    90 MICROgram(s) HFA Inhaler 2 Puff(s) Inhalation every 6 hours PRN  aMIOdarone    Tablet 400 milliGRAM(s) Oral every 8 hours  aMIOdarone    Tablet   Oral   buMETAnide Infusion 2 mG/Hr IV Continuous <Continuous>  ferrous    sulfate 325 milliGRAM(s) Oral daily  heparin  Infusion 1800 Unit(s)/Hr IV Continuous <Continuous>  insulin glargine Injectable (LANTUS) 12 Unit(s) SubCutaneous at bedtime  insulin lispro (ADMELOG) corrective regimen sliding scale   SubCutaneous three times a day before meals  insulin lispro (ADMELOG) corrective regimen sliding scale   SubCutaneous at bedtime  isosorbide   mononitrate ER Tablet (IMDUR) 30 milliGRAM(s) Oral daily  lidocaine   4% Patch 1 Patch Transdermal daily  melatonin 3 milliGRAM(s) Oral at bedtime PRN  metoprolol succinate ER 50 milliGRAM(s) Oral daily  pantoprazole    Tablet 40 milliGRAM(s) Oral before breakfast  predniSONE   Tablet 5 milliGRAM(s) Oral daily  simvastatin 10 milliGRAM(s) Oral at bedtime      Vitals:  T(F): 98.1 (10-25), Max: 98.1 (10-24)  HR: 118 (10-25) (64 - 128)  BP: 109/73 (10-25) (93/58 - 143/78)  RR: 18 (10-25)  SpO2: 98% (10-25)  I&O's Summary    24 Oct 2022 07:01  -  25 Oct 2022 07:00  --------------------------------------------------------  IN: 1156 mL / OUT: 5000 mL / NET: -3844 mL        Physical Exam:  Appearance: No acute distress; well appearing  Eyes: PERRL, EOMI, pink conjunctiva  HEENT: Normal oral mucosa  Cardiovascular: RRR, S1, S2, no murmurs, rubs, or gallops; no edema; no JVD  Respiratory: Clear to auscultation bilaterally  Gastrointestinal: soft, non-tender, non-distended with normal bowel sounds  Musculoskeletal: No clubbing; no joint deformity   Neurologic: Non-focal  Lymphatic: No lymphadenopathy  Psychiatry: AAOx3, mood & affect appropriate  Skin: No rashes, ecchymoses, or cyanosis                          10.0   5.93  )-----------( 322      ( 25 Oct 2022 04:52 )             34.0     10-25    135  |  88<L>  |  110<H>  ----------------------------<  126<H>  4.4   |  36<H>  |  3.02<H>    Ca    9.2      25 Oct 2022 04:52  Phos  4.6     10-25  Mg     2.3     10-25    TPro  7.8  /  Alb  3.8  /  TBili  0.4  /  DBili  x   /  AST  23  /  ALT  19  /  AlkPhos  103  10-25    PT/INR - ( 24 Oct 2022 20:56 )   PT: 12.9 sec;   INR: 1.12 ratio         PTT - ( 25 Oct 2022 04:53 )  PTT:65.3 sec  CARDIAC MARKERS ( 23 Oct 2022 00:25 )  61 ng/L / x     / x     / x     / x     / x          Serum Pro-Brain Natriuretic Peptide: 13251 pg/mL (10-24 @ 15:11)  Serum Pro-Brain Natriuretic Peptide: 46434 pg/mL (10-24 @ 01:36)  Serum Pro-Brain Natriuretic Peptide: 34022 pg/mL (10-23 @ 00:25)  Serum Pro-Brain Natriuretic Peptide: 58492 pg/mL (10-22 @ 19:55)          New ECG(s): Personally reviewed    Echo:    Stress Testing:     Cath:    Imaging:    Interpretation of Telemetry:   Patient seen and examined at bedside.      71 y/o F with pmhx of COPD on home O2(3L) with severe pHTN, MVR/TVR , AF s/p ablation,  OHS/MIGUEL on home biPAP, HTN, HLD who presents with one week of vomiting with poor oral intake also complaining of SOB and chest pain.  She also states she has been taking prednisone on/off for the past 3 months for chest tightness and does endorse some weight gain.     In the ED pt found to have A Flutter and given Amio 400 po and Dig loaded.     CCU COURSE: In the CCU, pt was diuresed w/ Bumex gtt and metolazone. Dig held and EP following. On amio. Pt endorses decrease in chest pain. Pt on home O2 requirement of 3-4L.     Overnight Events: No acute events overnight.     Review Of Systems: No chest pain, shortness of breath, or palpitations            Current Meds:  acetaminophen     Tablet .. 650 milliGRAM(s) Oral every 6 hours PRN  ALBUTerol    90 MICROgram(s) HFA Inhaler 2 Puff(s) Inhalation every 6 hours PRN  aMIOdarone    Tablet 400 milliGRAM(s) Oral every 8 hours  aMIOdarone    Tablet   Oral   buMETAnide Infusion 2 mG/Hr IV Continuous <Continuous>  ferrous    sulfate 325 milliGRAM(s) Oral daily  heparin  Infusion 1800 Unit(s)/Hr IV Continuous <Continuous>  insulin glargine Injectable (LANTUS) 12 Unit(s) SubCutaneous at bedtime  insulin lispro (ADMELOG) corrective regimen sliding scale   SubCutaneous three times a day before meals  insulin lispro (ADMELOG) corrective regimen sliding scale   SubCutaneous at bedtime  isosorbide   mononitrate ER Tablet (IMDUR) 30 milliGRAM(s) Oral daily  lidocaine   4% Patch 1 Patch Transdermal daily  melatonin 3 milliGRAM(s) Oral at bedtime PRN  metoprolol succinate ER 50 milliGRAM(s) Oral daily  pantoprazole    Tablet 40 milliGRAM(s) Oral before breakfast  predniSONE   Tablet 5 milliGRAM(s) Oral daily  simvastatin 10 milliGRAM(s) Oral at bedtime      Vitals:  T(F): 98.1 (10-25), Max: 98.1 (10-24)  HR: 118 (10-25) (64 - 128)  BP: 109/73 (10-25) (93/58 - 143/78)  RR: 18 (10-25)  SpO2: 98% (10-25)  I&O's Summary    24 Oct 2022 07:01  -  25 Oct 2022 07:00  --------------------------------------------------------  IN: 1156 mL / OUT: 5000 mL / NET: -3844 mL        Physical Exam:  Appearance: No acute distress; well appearing  Eyes: PERRL, EOMI, pink conjunctiva  HEENT: Normal oral mucosa  Cardiovascular: RRR, S1, S2, no murmurs, rubs, or gallops; no edema; no JVD  Respiratory: Clear to auscultation bilaterally  Gastrointestinal: soft, non-tender, non-distended with normal bowel sounds  Musculoskeletal: No clubbing; no joint deformity   Neurologic: Non-focal  Lymphatic: No lymphadenopathy  Psychiatry: AAOx3, mood & affect appropriate  Skin: No rashes, ecchymoses, or cyanosis                          10.0   5.93  )-----------( 322      ( 25 Oct 2022 04:52 )             34.0     10-25    135  |  88<L>  |  110<H>  ----------------------------<  126<H>  4.4   |  36<H>  |  3.02<H>    Ca    9.2      25 Oct 2022 04:52  Phos  4.6     10-25  Mg     2.3     10-25    TPro  7.8  /  Alb  3.8  /  TBili  0.4  /  DBili  x   /  AST  23  /  ALT  19  /  AlkPhos  103  10-25    PT/INR - ( 24 Oct 2022 20:56 )   PT: 12.9 sec;   INR: 1.12 ratio         PTT - ( 25 Oct 2022 04:53 )  PTT:65.3 sec  CARDIAC MARKERS ( 23 Oct 2022 00:25 )  61 ng/L / x     / x     / x     / x     / x          Serum Pro-Brain Natriuretic Peptide: 76082 pg/mL (10-24 @ 15:11)  Serum Pro-Brain Natriuretic Peptide: 77696 pg/mL (10-24 @ 01:36)  Serum Pro-Brain Natriuretic Peptide: 59487 pg/mL (10-23 @ 00:25)  Serum Pro-Brain Natriuretic Peptide: 60660 pg/mL (10-22 @ 19:55)          New ECG(s): Personally reviewed    Echo:    CONCLUSIONS:  Technically very difficult study.  1. Mechanical prosthetic mitral valve replacement, which  was poorly visualized.  2.Endocardium not well visualized; grossly mild to  moderate global left ventricular systolic dysfunction.  Endocardial visualization enhanced with intravenous  injection of Ultrasonic Enhancing Agent (Definity).  3. The right ventricle is not well visualized.  ------------------------------------------------------------------------  Confirmed on  10/23/2022 - 13:29:40 by Montana Azevedo M.D.,  [facility], [facility]    Imaging:    CXR from 10/23 with marked pulmoary edema      Interpretation of Telemetry:

## 2022-10-25 NOTE — PROGRESS NOTE ADULT - ASSESSMENT
71 y/o F with pmhx of COPD on home O2(3L) with severe pHTN, MVR/TVR , AF s/p ablation,  OHS/MIGUEL on home biPAP, HTN, HLD who presents with one week of vomiting with poor oral intake also complaining of SOB and chest pain.  She also states she has been taking prednisone on/off for the past 3 months for chest tightness and does endorse some weight gain. Found to be fluid overloaded and with atrial flutter    #HFrEF  - Continue home metoprolol 50mg daily and hydralazine  -Continue with imdur 30mg daily  - responded UOP to metolazone & bumex  -Can dose with lasix 80mg IV once and assess fluid status, keep net negative 1 to 2L  -Strict I/O   -Salt ad water restriction    #AFLutter  Hx of AF w RVR now w AFlutter  - Not on home AC  - Amio loaded, now on PO amio  - holding digoxin   - EP following  - Continue Metoprolol 50mg daily  -Continue heparin drip for now    #MVR/TV replacement off AC at home  - echo technically difficult, unable to assess due to habitus and positioning  - per chart review under last name Nancy MRN [id number] - mechanical valve replacement 1999, was previously on coumadin but stopped in 09/2021 after worsening anemia, concern for GI bleed & epistaxis.   - continue with heparin GTT  - may require ELIZABETH(?) if needs further assessment 73 y/o F with pmhx of COPD on home O2(3L) with severe pHTN, MVR/TVR , AF s/p ablation,  OHS/MIGUEL on home biPAP, HTN, HLD who presents with one week of vomiting with poor oral intake also complaining of SOB and chest pain.  She also states she has been taking prednisone on/off for the past 3 months for chest tightness and does endorse some weight gain. Found to be fluid overloaded and with atrial flutter    #HFrEF  - Continue home metoprolol 50mg daily and hydralazine  -Continue with imdur 30mg daily  - responded UOP to metolazone & bumex  - REduce bumex to 1 from 2 as the patient has urine output of 5L, keep net negative 1 to 2L  -Strict I/O   -Salt ad water restriction    #AFLutter  Hx of AF w RVR now w AFlutter  - Not on home AC  - Amio loaded, now on PO amio  - holding digoxin   - EP following  - Continue Metoprolol 50mg daily  -Continue heparin drip for now    #MVR/TV replacement off AC at home  - echo technically difficult, unable to assess due to habitus and positioning  - per chart review under last name Nancy MRN [id number] - mechanical valve replacement 1999, was previously on coumadin but stopped in 09/2021 after worsening anemia, concern for GI bleed & epistaxis.   - continue with heparin GTT  - may require ELIZABETH(?) if needs further assessment

## 2022-10-25 NOTE — PHYSICAL THERAPY INITIAL EVALUATION ADULT - PERTINENT HX OF CURRENT PROBLEM, REHAB EVAL
71 y/o F w/ pmhx of COPD on home O2(3L) with severe pHTN, MVR/TVR , AF s/p ablation,  OHS/MIGUEL on home biPAP, HTN, HLD who presents with one week of vomiting with poor oral intake also complaining of SOB and chest pain.  She also states she has been taking prednisone on/off for the past 3 months for chest tightness and does endorse some weight gain. Found to be fluid overloaded and with atrial flutter

## 2022-10-25 NOTE — PROGRESS NOTE ADULT - SUBJECTIVE AND OBJECTIVE BOX
SUBJECTIVE / OVERNIGHT EVENTS:pt seen and examined    MEDICATIONS  (STANDING):  aMIOdarone    Tablet 400 milliGRAM(s) Oral every 8 hours  aMIOdarone    Tablet   Oral   buMETAnide Infusion 1 mG/Hr (5 mL/Hr) IV Continuous <Continuous>  ferrous    sulfate 325 milliGRAM(s) Oral daily  heparin  Infusion 1800 Unit(s)/Hr (11 mL/Hr) IV Continuous <Continuous>  insulin glargine Injectable (LANTUS) 12 Unit(s) SubCutaneous at bedtime  insulin lispro (ADMELOG) corrective regimen sliding scale   SubCutaneous three times a day before meals  insulin lispro (ADMELOG) corrective regimen sliding scale   SubCutaneous at bedtime  isosorbide   mononitrate ER Tablet (IMDUR) 30 milliGRAM(s) Oral daily  lidocaine   4% Patch 1 Patch Transdermal daily  metoprolol succinate ER 50 milliGRAM(s) Oral daily  pantoprazole    Tablet 40 milliGRAM(s) Oral before breakfast  polyethylene glycol 3350 17 Gram(s) Oral daily  predniSONE   Tablet 5 milliGRAM(s) Oral daily  senna 2 Tablet(s) Oral at bedtime  simvastatin 10 milliGRAM(s) Oral at bedtime    MEDICATIONS  (PRN):  acetaminophen     Tablet .. 650 milliGRAM(s) Oral every 6 hours PRN Temp greater or equal to 38C (100.4F), Mild Pain (1 - 3)  ALBUTerol    90 MICROgram(s) HFA Inhaler 2 Puff(s) Inhalation every 6 hours PRN Bronchospasm  melatonin 3 milliGRAM(s) Oral at bedtime PRN Insomnia    T(C): 36.3 (10-25-22 @ 21:16), Max: 36.7 (10-25-22 @ 04:48)  HR: 85 (10-25-22 @ 22:34) (59 - 118)  BP: 126/72 (10-25-22 @ 21:16) (99/72 - 126/72)  RR: 18 (10-25-22 @ 21:16) (18 - 20)  SpO2: 98% (10-25-22 @ 22:34) (97% - 99%)    CAPILLARY BLOOD GLUCOSE      POCT Blood Glucose.: 123 mg/dL (25 Oct 2022 20:57)  POCT Blood Glucose.: 109 mg/dL (25 Oct 2022 17:22)  POCT Blood Glucose.: 119 mg/dL (25 Oct 2022 12:56)  POCT Blood Glucose.: 117 mg/dL (25 Oct 2022 08:41)    I&O's Summary    24 Oct 2022 07:01  -  25 Oct 2022 07:00  --------------------------------------------------------  IN: 1156 mL / OUT: 5000 mL / NET: -3844 mL    25 Oct 2022 07:01  -  26 Oct 2022 00:07  --------------------------------------------------------  IN: 240 mL / OUT: 2500 mL / NET: -2260 mL        Constitutional: No fever, fatigue  Skin: No rash.  Eyes: No recent vision problems or eye pain.  ENT: No congestion, ear pain, or sore throat.  Cardiovascular: No chest pain or palpation.  Respiratory: No cough, shortness of breath, congestion, or wheezing.  Gastrointestinal: No abdominal pain, nausea, vomiting, or diarrhea.  Genitourinary: No dysuria.  Musculoskeletal: No joint swelling.  Neurologic: No headache.    PHYSICAL EXAM:  GENERAL: NAD  EYES: EOMI, PERRLA  NECK: Supple, No JVD  CHEST/LUNG: dec breath sounds at bases  HEART:  S1 , S2 +  ABDOMEN: soft , bs+  EXTREMITIES:  edema+  NEUROLOGY:alert awake oriented      LABS:                        10.0   5.93  )-----------( 322      ( 25 Oct 2022 04:52 )             34.0     10-25    135  |  88<L>  |  110<H>  ----------------------------<  126<H>  4.4   |  36<H>  |  3.02<H>    Ca    9.2      25 Oct 2022 04:52  Phos  4.6     10-25  Mg     2.3     10-25    TPro  7.8  /  Alb  3.8  /  TBili  0.4  /  DBili  x   /  AST  23  /  ALT  19  /  AlkPhos  103  10-25    PT/INR - ( 24 Oct 2022 20:56 )   PT: 12.9 sec;   INR: 1.12 ratio         PTT - ( 25 Oct 2022 04:53 )  PTT:65.3 sec          RADIOLOGY & ADDITIONAL TESTS:    Imaging Personally Reviewed:    Consultant(s) Notes Reviewed:      Care Discussed with Consultants/Other Providers:

## 2022-10-25 NOTE — PROGRESS NOTE ADULT - ASSESSMENT
73 y/o F with pmhx of COPD on home O2(3L) with severe pHTN, MVR/TVR , AF s/p ablation,  OHS/MIGUEL on home biPAP, HTN, HLD who presents with one week of vomiting with poor oral intake also complaining of SOB and chest pain.  She also states she has been taking prednisone on/off for the past 3 months for chest tightness and does endorse some weight gain.     In the ED pt found to have A Flutter and given Amio 400 po and Dig loaded.     #PULM  - Currently on home 3-4 L NC, No exp wheezing   - CXR likely fluid OL  - Continue proair inhalation  - c/w prednisone 5mg since on chronically for 3 months    #CV  Hx of AF w RVR now w AFlutter  - Not on home AC  - obtain TTE, doesnt appear to be in cardiogenic shock but has pulm edema  - Continue home metoprolol and hydralazine  - responded UOP to metolazone & bumex  -   AFLutter  - Amio loaded  - holding digoxin   - EP following  - Continue Metoprolol    MVR/TV replacement off AC  - echo technically difficult, unable to assess due to habitus and positioning  - per chart review under last name Nancy MRN [id number] - mechanical valve replacement 1999, was previously on coumadin but stopped in 09/2021 after worsening anemia, concern for GI bleed & epistaxis.   - continue with heparin GTT  - may require ELIZABETH(?) if needs further assessment    #GI  - Continue DASH CC diet      #RENAL  - Cr uptrending, now 3.01  - baseline ~1.6  - monitor while diuresing  - renal consult if Cr continues to uptrend & UOP declines    #ID  - NO WBC or fevers, no cough or purulent sputum  - monitor off Abbx for now    #ENDO  - A1c 10.7  - FS ~100-130  - Continue home Lantus and premeal, adjust as appropriate    #HEME  - heparin GTT  - monitor PTT and adjust as appropriate      ETHICS/DISPO:  - Full code  - dispo based on labs/UOP/Cr

## 2022-10-25 NOTE — PHYSICAL THERAPY INITIAL EVALUATION ADULT - ACTIVE RANGE OF MOTION EXAMINATION, REHAB EVAL
Except limited B  shoulder flexion/bilateral upper extremity Active ROM was WFL (within functional limits)/bilateral  lower extremity Active ROM was WFL (within functional limits)

## 2022-10-25 NOTE — PHYSICAL THERAPY INITIAL EVALUATION ADULT - ADDITIONAL COMMENTS
Patient reports she lives with her spouse and daughter in a house with 4 steps to enter. She reports ambulating short distances with a rollator and requires assistance with ADLS. She is homebound.

## 2022-10-25 NOTE — PROGRESS NOTE ADULT - SUBJECTIVE AND OBJECTIVE BOX
PULMONARY PROGRESS NOTE    DAMARI GUERRERO  MRN-[id number]    Patient is a 72y old  Female who presents with a chief complaint of Vomiting (25 Oct 2022 08:05)      HPI:  -says she feels a little better, urinating a lot  -should be on bipap during sleep, she can only tolerate nasal mask    ROS:   -    ACTIVE MEDICATION LIST:  MEDICATIONS  (STANDING):  aMIOdarone    Tablet 400 milliGRAM(s) Oral every 8 hours  aMIOdarone    Tablet   Oral   buMETAnide Infusion 1 mG/Hr (5 mL/Hr) IV Continuous <Continuous>  ferrous    sulfate 325 milliGRAM(s) Oral daily  heparin  Infusion 1800 Unit(s)/Hr (11 mL/Hr) IV Continuous <Continuous>  insulin glargine Injectable (LANTUS) 12 Unit(s) SubCutaneous at bedtime  insulin lispro (ADMELOG) corrective regimen sliding scale   SubCutaneous three times a day before meals  insulin lispro (ADMELOG) corrective regimen sliding scale   SubCutaneous at bedtime  isosorbide   mononitrate ER Tablet (IMDUR) 30 milliGRAM(s) Oral daily  lidocaine   4% Patch 1 Patch Transdermal daily  metoprolol succinate ER 50 milliGRAM(s) Oral daily  pantoprazole    Tablet 40 milliGRAM(s) Oral before breakfast  predniSONE   Tablet 5 milliGRAM(s) Oral daily  simvastatin 10 milliGRAM(s) Oral at bedtime    MEDICATIONS  (PRN):  acetaminophen     Tablet .. 650 milliGRAM(s) Oral every 6 hours PRN Temp greater or equal to 38C (100.4F), Mild Pain (1 - 3)  ALBUTerol    90 MICROgram(s) HFA Inhaler 2 Puff(s) Inhalation every 6 hours PRN Bronchospasm  melatonin 3 milliGRAM(s) Oral at bedtime PRN Insomnia      EXAM:  Vital Signs Last 24 Hrs  T(C): 36.1 (25 Oct 2022 13:15), Max: 36.7 (24 Oct 2022 16:00)  T(F): 97 (25 Oct 2022 13:15), Max: 98.1 (24 Oct 2022 16:00)  HR: 91 (25 Oct 2022 13:15) (68 - 128)  BP: 110/73 (25 Oct 2022 13:15) (93/58 - 143/78)  BP(mean): 82 (25 Oct 2022 00:00) (70 - 109)  RR: 20 (25 Oct 2022 13:15) (18 - 59)  SpO2: 97% (25 Oct 2022 13:15) (88% - 100%)    Parameters below as of 25 Oct 2022 13:15  Patient On (Oxygen Delivery Method): nasal cannula  O2 Flow (L/min): 4      GENERAL: The patient is awake and alert in no apparent distress.     LUNGS: Clear to auscultation without wheezing, rales or rhonchi; respirations unlabored        LABS/IMAGING: reviewed                        10.0   5.93  )-----------( 322      ( 25 Oct 2022 04:52 )             34.0   10-25    135  |  88<L>  |  110<H>  ----------------------------<  126<H>  4.4   |  36<H>  |  3.02<H>    Ca    9.2      25 Oct 2022 04:52  Phos  4.6     10-25  Mg     2.3     10-25    TPro  7.8  /  Alb  3.8  /  TBili  0.4  /  DBili  x   /  AST  23  /  ALT  19  /  AlkPhos  103  10-25      < from: Xray Chest 1 View-PORTABLE IMMEDIATE (Xray Chest 1 View-PORTABLE IMMEDIATE .) (10.23.22 @ 00:39) >    ACC: [id number] EXAM:  XR CHEST PORTABLE IMMED 1V                          PROCEDURE DATE:  10/23/2022          INTERPRETATION:  EXAMINATION: XR CHEST IMMEDIATE    CLINICAL INDICATION: Dyspnea    TECHNIQUE: Single frontal, portable view of the chest was obtained.    COMPARISON: Chest x-ray 9/6/2022, abdominal CT 10/22/2022    FINDINGS:  Status post median sternotomy.  The heart size is not well evaluated on this projection.  Redemonstrated bilateral hazy opacities similar to prior exam: Pulmonary  edema/interstitial/alveolar infiltrates.  No pneumothorax.    IMPRESSION:  Pulmonary edema/interstitial/alveolar infiltrates, similar to prior exam.    --- End of Report ---           KAZ DALE MD; Resident Radiologist  This document has been electronically signed.  SILVANA GALLARDO DO; Attending Radiologist  This document has been electronically signed. Oct 23 2022  8:27AM    < end of copied text >    PROBLEM LIST:  72y Female with HEALTH ISSUES - PROBLEM Dx:  MIGUEL/OHV    RECS:  -diuresis per cards  -should be on bipap qhs via nasal mask  -monitor sats      Em Merida MD   [phone]

## 2022-10-26 LAB
ANION GAP SERPL CALC-SCNC: 10 MMOL/L — SIGNIFICANT CHANGE UP (ref 5–17)
APTT BLD: 84.2 SEC — HIGH (ref 27.5–35.5)
BUN SERPL-MCNC: 106 MG/DL — HIGH (ref 7–23)
CALCIUM SERPL-MCNC: 9.7 MG/DL — SIGNIFICANT CHANGE UP (ref 8.4–10.5)
CHLORIDE SERPL-SCNC: 85 MMOL/L — LOW (ref 96–108)
CO2 SERPL-SCNC: 40 MMOL/L — HIGH (ref 22–31)
CREAT SERPL-MCNC: 3.17 MG/DL — HIGH (ref 0.5–1.3)
EGFR: 15 ML/MIN/1.73M2 — LOW
GLUCOSE BLDC GLUCOMTR-MCNC: 127 MG/DL — HIGH (ref 70–99)
GLUCOSE BLDC GLUCOMTR-MCNC: 135 MG/DL — HIGH (ref 70–99)
GLUCOSE BLDC GLUCOMTR-MCNC: 143 MG/DL — HIGH (ref 70–99)
GLUCOSE BLDC GLUCOMTR-MCNC: 214 MG/DL — HIGH (ref 70–99)
GLUCOSE SERPL-MCNC: 96 MG/DL — SIGNIFICANT CHANGE UP (ref 70–99)
HCT VFR BLD CALC: 33.9 % — LOW (ref 34.5–45)
HGB BLD-MCNC: 9.7 G/DL — LOW (ref 11.5–15.5)
INR BLD: 1.13 RATIO — SIGNIFICANT CHANGE UP (ref 0.88–1.16)
MAGNESIUM SERPL-MCNC: 2.2 MG/DL — SIGNIFICANT CHANGE UP (ref 1.6–2.6)
MCHC RBC-ENTMCNC: 28.6 GM/DL — LOW (ref 32–36)
MCHC RBC-ENTMCNC: 29 PG — SIGNIFICANT CHANGE UP (ref 27–34)
MCV RBC AUTO: 101.2 FL — HIGH (ref 80–100)
NRBC # BLD: 0 /100 WBCS — SIGNIFICANT CHANGE UP (ref 0–0)
PHOSPHATE SERPL-MCNC: 4.3 MG/DL — SIGNIFICANT CHANGE UP (ref 2.5–4.5)
PLATELET # BLD AUTO: 317 K/UL — SIGNIFICANT CHANGE UP (ref 150–400)
POTASSIUM SERPL-MCNC: 4.2 MMOL/L — SIGNIFICANT CHANGE UP (ref 3.5–5.3)
POTASSIUM SERPL-SCNC: 4.2 MMOL/L — SIGNIFICANT CHANGE UP (ref 3.5–5.3)
PROTHROM AB SERPL-ACNC: 13 SEC — SIGNIFICANT CHANGE UP (ref 10.5–13.4)
RBC # BLD: 3.35 M/UL — LOW (ref 3.8–5.2)
RBC # FLD: 15.4 % — HIGH (ref 10.3–14.5)
SODIUM SERPL-SCNC: 135 MMOL/L — SIGNIFICANT CHANGE UP (ref 135–145)
WBC # BLD: 6.31 K/UL — SIGNIFICANT CHANGE UP (ref 3.8–10.5)
WBC # FLD AUTO: 6.31 K/UL — SIGNIFICANT CHANGE UP (ref 3.8–10.5)

## 2022-10-26 PROCEDURE — 99232 SBSQ HOSP IP/OBS MODERATE 35: CPT

## 2022-10-26 PROCEDURE — 99233 SBSQ HOSP IP/OBS HIGH 50: CPT

## 2022-10-26 RX ORDER — BUMETANIDE 0.25 MG/ML
2 INJECTION INTRAMUSCULAR; INTRAVENOUS
Refills: 0 | Status: DISCONTINUED | OUTPATIENT
Start: 2022-10-26 | End: 2022-10-27

## 2022-10-26 RX ORDER — WARFARIN SODIUM 2.5 MG/1
5 TABLET ORAL ONCE
Refills: 0 | Status: COMPLETED | OUTPATIENT
Start: 2022-10-26 | End: 2022-10-26

## 2022-10-26 RX ORDER — LACTULOSE 10 G/15ML
10 SOLUTION ORAL ONCE
Refills: 0 | Status: COMPLETED | OUTPATIENT
Start: 2022-10-26 | End: 2022-10-26

## 2022-10-26 RX ADMIN — AMIODARONE HYDROCHLORIDE 400 MILLIGRAM(S): 400 TABLET ORAL at 05:14

## 2022-10-26 RX ADMIN — LIDOCAINE 1 PATCH: 4 CREAM TOPICAL at 01:44

## 2022-10-26 RX ADMIN — LIDOCAINE 1 PATCH: 4 CREAM TOPICAL at 19:00

## 2022-10-26 RX ADMIN — AMIODARONE HYDROCHLORIDE 400 MILLIGRAM(S): 400 TABLET ORAL at 15:01

## 2022-10-26 RX ADMIN — HEPARIN SODIUM 11 UNIT(S)/HR: 5000 INJECTION INTRAVENOUS; SUBCUTANEOUS at 07:17

## 2022-10-26 RX ADMIN — Medication 650 MILLIGRAM(S): at 21:35

## 2022-10-26 RX ADMIN — PANTOPRAZOLE SODIUM 40 MILLIGRAM(S): 20 TABLET, DELAYED RELEASE ORAL at 05:13

## 2022-10-26 RX ADMIN — AMIODARONE HYDROCHLORIDE 400 MILLIGRAM(S): 400 TABLET ORAL at 21:06

## 2022-10-26 RX ADMIN — SENNA PLUS 2 TABLET(S): 8.6 TABLET ORAL at 21:11

## 2022-10-26 RX ADMIN — Medication 325 MILLIGRAM(S): at 15:01

## 2022-10-26 RX ADMIN — Medication 5 MILLIGRAM(S): at 05:13

## 2022-10-26 RX ADMIN — POLYETHYLENE GLYCOL 3350 17 GRAM(S): 17 POWDER, FOR SOLUTION ORAL at 15:01

## 2022-10-26 RX ADMIN — WARFARIN SODIUM 5 MILLIGRAM(S): 2.5 TABLET ORAL at 21:09

## 2022-10-26 RX ADMIN — Medication 50 MILLIGRAM(S): at 05:13

## 2022-10-26 RX ADMIN — Medication 3 MILLIGRAM(S): at 21:06

## 2022-10-26 RX ADMIN — ISOSORBIDE MONONITRATE 30 MILLIGRAM(S): 60 TABLET, EXTENDED RELEASE ORAL at 15:02

## 2022-10-26 RX ADMIN — SIMVASTATIN 10 MILLIGRAM(S): 20 TABLET, FILM COATED ORAL at 21:06

## 2022-10-26 RX ADMIN — INSULIN GLARGINE 12 UNIT(S): 100 INJECTION, SOLUTION SUBCUTANEOUS at 21:09

## 2022-10-26 RX ADMIN — Medication 650 MILLIGRAM(S): at 21:05

## 2022-10-26 RX ADMIN — HEPARIN SODIUM 11 UNIT(S)/HR: 5000 INJECTION INTRAVENOUS; SUBCUTANEOUS at 19:06

## 2022-10-26 RX ADMIN — LIDOCAINE 1 PATCH: 4 CREAM TOPICAL at 15:01

## 2022-10-26 NOTE — PROGRESS NOTE ADULT - ASSESSMENT
71 y/o F with pmhx of COPD on home O2(3L) with severe pHTN, MVR/TVR , AF s/p ablation,  OHS/MIGUEL on home biPAP, HTN, HLD who presents with one week of vomiting with poor oral intake also complaining of SOB and chest pain.  She also states she has been taking prednisone on/off for the past 3 months for chest tightness and does endorse some weight gain.     In the ED pt found to have A Flutter and given Amio 400 po and Dig loaded.     #PULM  - Currently on home 3-4 L NC, No exp wheezing   - CXR likely fluid OL  - Continue proair inhalation  - c/w prednisone 5mg since on chronically for 3 months    #CV  Hx of AF w RVR now w AFlutter  - Not on home AC  - obtain TTE, doesnt appear to be in cardiogenic shock but has pulm edema  - Continue home metoprolol and hydralazine  - responded UOP to metolazone & bumex  -   AFLutter  - Amio loaded  - holding digoxin   - EP following  - Continue Metoprolol    MVR/TV replacement off AC  - echo technically difficult, unable to assess due to habitus and positioning  - per chart review under last name Nancy MRN [id number] - mechanical valve replacement 1999, was previously on coumadin but stopped in 09/2021 after worsening anemia, concern for GI bleed & epistaxis.   - continue with heparin GTT  - may require ELIZABETH(?) if needs further assessment    #GI  - Continue DASH CC diet      #RENAL  - Cr uptrending, now 3.01  - baseline ~1.6  - monitor while diuresing  - renal consult if Cr continues to uptrend & UOP declines    #ID  - NO WBC or fevers, no cough or purulent sputum  - monitor off Abbx for now    #ENDO  - A1c 10.7  - FS ~100-130  - Continue home Lantus and premeal, adjust as appropriate    #HEME  - heparin GTT  - monitor PTT and adjust as appropriate      ETHICS/DISPO:  - Full code  - dispo based on labs/UOP/Cr

## 2022-10-26 NOTE — PROGRESS NOTE ADULT - ASSESSMENT
73 y/o F with pmhx of COPD on home O2(3L) with severe pHTN, MVR/TVR , AF s/p ablation,  OHS/MIGUEL on home biPAP, HTN, HLD who presents with one week of vomiting with poor oral intake also complaining of SOB and chest pain.  She also states she has been taking prednisone on/off for the past 3 months for chest tightness and does endorse some weight gain. Found to be fluid overloaded and with atrial flutter    #HFrEF  - Continue home metoprolol 50mg daily and hydralazine  -Continue with imdur 30mg daily  - responded UOP to metolazone & bumex  - Can switch bumex drip to lasix 60mg IV BID  -Strict I/O   -Salt ad water restriction    #AFLutter  Hx of AF w RVR now w AFlutter  - Not on home AC  - Amio loaded, now on PO amio  - holding digoxin   - EP following  - Continue Metoprolol 50mg daily  - Continue heparin drip for now    #MVR/TV replacement off AC at home  - echo technically difficult, unable to assess due to habitus and positioning  - per chart review under last name Nancy MRN [id number] - mechanical valve replacement 1999, was previously on coumadin but stopped in 09/2021 after worsening anemia, concern for GI bleed & epistaxis.   - continue with heparin GTT    Darwin Oliver MD  Cardiology fellow   71 y/o F with pmhx of COPD on home O2(3L) with severe pHTN, MVR/TVR , AF s/p ablation,  OHS/MIGUEL on home biPAP, HTN, HLD who presents with one week of vomiting with poor oral intake also complaining of SOB and chest pain.  She also states she has been taking prednisone on/off for the past 3 months for chest tightness and does endorse some weight gain. Found to be fluid overloaded and with atrial flutter    #HFrEF  - Continue home metoprolol 50mg daily and hydralazine  -Continue with imdur 30mg daily  - responded UOP to metolazone & bumex  - Can switch bumex drip to home bumex dose of 2mg PO PAULINO as patient close to euvolemia and now has bump in creatinine  -Strict I/O   -Salt and water restriction    #AFLutter  Hx of AF w RVR now w AFlutter  - Not on home AC  - Amio loaded, now on PO amio  - holding digoxin   - EP following  - Continue Metoprolol 50mg daily  - Continue heparin drip for now and start bridging patient to warfarin with a goal INR of 2.5-3    #MVR/TV replacement off AC at home  - echo technically difficult, unable to assess due to habitus and positioning  - per chart review under last name Nancy MRN [id number] - mechanical valve replacement 1999, was previously on coumadin but stopped in 09/2021 after worsening anemia, concern for GI bleed & epistaxis.   - continue with heparin GTT and start bridging patient to warfarin with a goal INR of 2.5-3    Darwin Oliver MD  Cardiology fellow

## 2022-10-26 NOTE — PROGRESS NOTE ADULT - SUBJECTIVE AND OBJECTIVE BOX
Patient seen and examined at bedside.    71 y/o F with pmhx of COPD on home O2(3L) with severe pHTN, MVR/TVR , AF s/p ablation,  OHS/MIGUEL on home biPAP, HTN, HLD who presents with one week of vomiting with poor oral intake also complaining of SOB and chest pain.  She also states she has been taking prednisone on/off for the past 3 months for chest tightness and does endorse some weight gain.     In the ED pt found to have A Flutter and given Amio 400 po and Dig loaded.     CCU COURSE: In the CCU, pt was diuresed w/ Bumex gtt and metolazone. Dig held and EP following. On amio. Pt endorses decrease in chest pain. Pt on home O2 requirement of 3-4L.     Overnight Events: No acute events overnight    Review Of Systems: No chest pain, shortness of breath, or palpitations            Current Meds:  acetaminophen     Tablet .. 650 milliGRAM(s) Oral every 6 hours PRN  ALBUTerol    90 MICROgram(s) HFA Inhaler 2 Puff(s) Inhalation every 6 hours PRN  aMIOdarone    Tablet 400 milliGRAM(s) Oral every 8 hours  aMIOdarone    Tablet   Oral   buMETAnide Infusion 1 mG/Hr IV Continuous <Continuous>  ferrous    sulfate 325 milliGRAM(s) Oral daily  heparin  Infusion 1800 Unit(s)/Hr IV Continuous <Continuous>  insulin glargine Injectable (LANTUS) 12 Unit(s) SubCutaneous at bedtime  insulin lispro (ADMELOG) corrective regimen sliding scale   SubCutaneous three times a day before meals  insulin lispro (ADMELOG) corrective regimen sliding scale   SubCutaneous at bedtime  isosorbide   mononitrate ER Tablet (IMDUR) 30 milliGRAM(s) Oral daily  lidocaine   4% Patch 1 Patch Transdermal daily  melatonin 3 milliGRAM(s) Oral at bedtime PRN  metoprolol succinate ER 50 milliGRAM(s) Oral daily  pantoprazole    Tablet 40 milliGRAM(s) Oral before breakfast  polyethylene glycol 3350 17 Gram(s) Oral daily  predniSONE   Tablet 5 milliGRAM(s) Oral daily  senna 2 Tablet(s) Oral at bedtime  simvastatin 10 milliGRAM(s) Oral at bedtime      Vitals:  T(F): 97.3 (10-26), Max: 98 (10-25)  HR: 99 (10-26) (59 - 109)  BP: 106/64 (10-26) (99/72 - 126/72)  RR: 18 (10-26)  SpO2: 100% (10-26)  I&O's Summary    24 Oct 2022 07:01  -  25 Oct 2022 07:00  --------------------------------------------------------  IN: 1156 mL / OUT: 5000 mL / NET: -3844 mL    25 Oct 2022 07:01  -  26 Oct 2022 06:38  --------------------------------------------------------  IN: 240 mL / OUT: 2950 mL / NET: -2710 mL        Physical Exam:  Appearance: No acute distress; well appearing  Eyes: PERRL, EOMI, pink conjunctiva  HEENT: Normal oral mucosa  Cardiovascular: RRR, S1, S2, no murmurs, rubs, or gallops; no edema; no JVD  Respiratory: Clear to auscultation bilaterally  Gastrointestinal: soft, non-tender, non-distended with normal bowel sounds  Musculoskeletal: No clubbing; no joint deformity   Neurologic: Non-focal  Lymphatic: No lymphadenopathy  Psychiatry: AAOx3, mood & affect appropriate  Skin: No rashes, ecchymoses, or cyanosis                          10.0   5.93  )-----------( 322      ( 25 Oct 2022 04:52 )             34.0     10-25    135  |  88<L>  |  110<H>  ----------------------------<  126<H>  4.4   |  36<H>  |  3.02<H>    Ca    9.2      25 Oct 2022 04:52  Phos  4.6     10-25  Mg     2.3     10-25    TPro  7.8  /  Alb  3.8  /  TBili  0.4  /  DBili  x   /  AST  23  /  ALT  19  /  AlkPhos  103  10-25    PT/INR - ( 24 Oct 2022 20:56 )   PT: 12.9 sec;   INR: 1.12 ratio         PTT - ( 25 Oct 2022 04:53 )  PTT:65.3 sec  CARDIAC MARKERS ( 23 Oct 2022 00:25 )  61 ng/L / x     / x     / x     / x     / x          Serum Pro-Brain Natriuretic Peptide: 69970 pg/mL (10-24 @ 15:11)  Serum Pro-Brain Natriuretic Peptide: 33828 pg/mL (10-24 @ 01:36)  Serum Pro-Brain Natriuretic Peptide: 65764 pg/mL (10-23 @ 00:25)  Serum Pro-Brain Natriuretic Peptide: 70402 pg/mL (10-22 @ 19:55)          New ECG(s): Personally reviewed    Echo:    CONCLUSIONS:  Technically very difficult study.  1. Mechanical prosthetic mitral valve replacement, which  was poorly visualized.  2.Endocardium not well visualized; grossly mild to  moderate global left ventricular systolic dysfunction.  Endocardial visualization enhanced with intravenous  injection of Ultrasonic Enhancing Agent (Definity).  3. The right ventricle is not well visualized.  ------------------------------------------------------------------------  Confirmed on  10/23/2022 - 13:29:40 by Montana Azevedo M.D.,  [facility], [facility]    Imaging:    Interpretation of Telemetry:   Patient seen and examined at bedside.    71 y/o F with pmhx of COPD on home O2(3L) with severe pHTN, MVR/TVR , AF s/p ablation,  OHS/MIGUEL on home biPAP, HTN, HLD who presents with one week of vomiting with poor oral intake also complaining of SOB and chest pain.  She also states she has been taking prednisone on/off for the past 3 months for chest tightness and does endorse some weight gain.     In the ED pt found to have A Flutter and given Amio 400 po and Dig loaded.     CCU COURSE: In the CCU, pt was diuresed w/ Bumex gtt and metolazone. Dig held and EP following. On amio. Pt endorses decrease in chest pain. Pt on home O2 requirement of 3-4L.     Overnight Events: No acute events overnight, she feels better this morning. Was not short of breath and believes the fluid in her legs is decreased.     Review Of Systems: No chest pain, shortness of breath, or palpitations            Current Meds:  acetaminophen     Tablet .. 650 milliGRAM(s) Oral every 6 hours PRN  ALBUTerol    90 MICROgram(s) HFA Inhaler 2 Puff(s) Inhalation every 6 hours PRN  aMIOdarone    Tablet 400 milliGRAM(s) Oral every 8 hours  aMIOdarone    Tablet   Oral   buMETAnide Infusion 1 mG/Hr IV Continuous <Continuous>  ferrous    sulfate 325 milliGRAM(s) Oral daily  heparin  Infusion 1800 Unit(s)/Hr IV Continuous <Continuous>  insulin glargine Injectable (LANTUS) 12 Unit(s) SubCutaneous at bedtime  insulin lispro (ADMELOG) corrective regimen sliding scale   SubCutaneous three times a day before meals  insulin lispro (ADMELOG) corrective regimen sliding scale   SubCutaneous at bedtime  isosorbide   mononitrate ER Tablet (IMDUR) 30 milliGRAM(s) Oral daily  lidocaine   4% Patch 1 Patch Transdermal daily  melatonin 3 milliGRAM(s) Oral at bedtime PRN  metoprolol succinate ER 50 milliGRAM(s) Oral daily  pantoprazole    Tablet 40 milliGRAM(s) Oral before breakfast  polyethylene glycol 3350 17 Gram(s) Oral daily  predniSONE   Tablet 5 milliGRAM(s) Oral daily  senna 2 Tablet(s) Oral at bedtime  simvastatin 10 milliGRAM(s) Oral at bedtime      Vitals:  T(F): 97.3 (10-26), Max: 98 (10-25)  HR: 99 (10-26) (59 - 109)  BP: 106/64 (10-26) (99/72 - 126/72)  RR: 18 (10-26)  SpO2: 100% (10-26)  I&O's Summary    24 Oct 2022 07:01  -  25 Oct 2022 07:00  --------------------------------------------------------  IN: 1156 mL / OUT: 5000 mL / NET: -3844 mL    25 Oct 2022 07:01  -  26 Oct 2022 06:38  --------------------------------------------------------  IN: 240 mL / OUT: 2950 mL / NET: -2710 mL        Physical Exam:  Appearance: No acute distress; well appearing  Eyes: PERRL, EOMI, pink conjunctiva  HEENT: Normal oral mucosa. Difficult to assess JVD  Cardiovascular: RRR, S1, S2, no murmurs, rubs, or gallops; no edema; Trace pitting edema  Respiratory: No crackles can be heard compared to yesterday  Gastrointestinal: soft, non-tender, non-distended with normal bowel sounds  Musculoskeletal: No clubbing; no joint deformity   Neurologic: Non-focal  Lymphatic: No lymphadenopathy  Psychiatry: AAOx3, mood & affect appropriate  Skin: No rashes, ecchymoses, or cyanosis                          10.0   5.93  )-----------( 322      ( 25 Oct 2022 04:52 )             34.0     10-25    135  |  88<L>  |  110<H>  ----------------------------<  126<H>  4.4   |  36<H>  |  3.02<H>    Ca    9.2      25 Oct 2022 04:52  Phos  4.6     10-25  Mg     2.3     10-25    TPro  7.8  /  Alb  3.8  /  TBili  0.4  /  DBili  x   /  AST  23  /  ALT  19  /  AlkPhos  103  10-25    PT/INR - ( 24 Oct 2022 20:56 )   PT: 12.9 sec;   INR: 1.12 ratio         PTT - ( 25 Oct 2022 04:53 )  PTT:65.3 sec  CARDIAC MARKERS ( 23 Oct 2022 00:25 )  61 ng/L / x     / x     / x     / x     / x          Serum Pro-Brain Natriuretic Peptide: 45430 pg/mL (10-24 @ 15:11)  Serum Pro-Brain Natriuretic Peptide: 31404 pg/mL (10-24 @ 01:36)  Serum Pro-Brain Natriuretic Peptide: 81512 pg/mL (10-23 @ 00:25)  Serum Pro-Brain Natriuretic Peptide: 96554 pg/mL (10-22 @ 19:55)          New ECG(s): Personally reviewed    Echo:    CONCLUSIONS:  Technically very difficult study.  1. Mechanical prosthetic mitral valve replacement, which  was poorly visualized.  2.Endocardium not well visualized; grossly mild to  moderate global left ventricular systolic dysfunction.  Endocardial visualization enhanced with intravenous  injection of Ultrasonic Enhancing Agent (Definity).  3. The right ventricle is not well visualized.  ------------------------------------------------------------------------  Confirmed on  10/23/2022 - 13:29:40 by Montana Azevedo M.D.,  [facility], [facility]    Imaging:    Interpretation of Telemetry:

## 2022-10-26 NOTE — PROGRESS NOTE ADULT - ATTENDING COMMENTS
Brisk UOP. To change to PO Bumex today.  Plan to resume anticoagulation with warfarin given history of mechanical prosthetic valve, with lower INR target to reduce bleeding risk

## 2022-10-26 NOTE — PROGRESS NOTE ADULT - SUBJECTIVE AND OBJECTIVE BOX
SUBJECTIVE / OVERNIGHT EVENTS:pt seen and examined    MEDICATIONS  (STANDING):  aMIOdarone    Tablet 200 milliGRAM(s) Oral daily  aMIOdarone    Tablet   Oral   buMETAnide 2 milliGRAM(s) Oral two times a day  ferrous    sulfate 325 milliGRAM(s) Oral daily  heparin  Infusion 1800 Unit(s)/Hr (11 mL/Hr) IV Continuous <Continuous>  insulin glargine Injectable (LANTUS) 12 Unit(s) SubCutaneous at bedtime  insulin lispro (ADMELOG) corrective regimen sliding scale   SubCutaneous three times a day before meals  insulin lispro (ADMELOG) corrective regimen sliding scale   SubCutaneous at bedtime  isosorbide   mononitrate ER Tablet (IMDUR) 30 milliGRAM(s) Oral daily  lidocaine   4% Patch 1 Patch Transdermal daily  metoprolol succinate ER 50 milliGRAM(s) Oral daily  pantoprazole    Tablet 40 milliGRAM(s) Oral before breakfast  polyethylene glycol 3350 17 Gram(s) Oral daily  predniSONE   Tablet 5 milliGRAM(s) Oral daily  senna 2 Tablet(s) Oral at bedtime  simvastatin 10 milliGRAM(s) Oral at bedtime    MEDICATIONS  (PRN):  acetaminophen     Tablet .. 650 milliGRAM(s) Oral every 6 hours PRN Temp greater or equal to 38C (100.4F), Mild Pain (1 - 3)  ALBUTerol    90 MICROgram(s) HFA Inhaler 2 Puff(s) Inhalation every 6 hours PRN Bronchospasm  melatonin 3 milliGRAM(s) Oral at bedtime PRN Insomnia    Vital Signs Last 24 Hrs  T(C): 36.5 (10-26-22 @ 20:45), Max: 36.7 (10-26-22 @ 12:06)  T(F): 97.7 (10-26-22 @ 20:45), Max: 98 (10-26-22 @ 12:06)  HR: 108 (10-26-22 @ 22:28) (77 - 110)  BP: 108/68 (10-26-22 @ 20:45) (106/64 - 121/76)  BP(mean): 89 (10-26-22 @ 12:06) (89 - 89)  RR: 18 (10-26-22 @ 20:45) (18 - 20)  SpO2: 98% (10-26-22 @ 22:28) (94% - 100%)    Skin: No rash.  Eyes: No recent vision problems or eye pain.  ENT: No congestion, ear pain, or sore throat.  Cardiovascular: No chest pain or palpation.  Respiratory: No cough, shortness of breath, congestion, or wheezing.  Gastrointestinal: No abdominal pain, nausea, vomiting, or diarrhea.  Genitourinary: No dysuria.  Musculoskeletal: No joint swelling.  Neurologic: No headache.    PHYSICAL EXAM:  GENERAL: NAD  EYES: EOMI, PERRLA  NECK: Supple, No JVD  CHEST/LUNG: dec breath sounds at bases  HEART:  S1 , S2 +  ABDOMEN: soft , bs+  EXTREMITIES:  edema+  NEUROLOGY:alert awake oriented      LABS:  10-26    135  |  85<L>  |  106<H>  ----------------------------<  96  4.2   |  40<H>  |  3.17<H>    Ca    9.7      26 Oct 2022 06:31  Phos  4.3     10-26  Mg     2.2     10-26    TPro  7.8  /  Alb  3.8  /  TBili  0.4  /  DBili      /  AST  23  /  ALT  19  /  AlkPhos  103  10-25    Creatinine Trend: 3.17 <--, 3.02 <--, 2.97 <--, 2.96 <--, 3.01 <--, 2.83 <--, 2.78 <--, 2.87 <--                        9.7    6.31  )-----------( 317      ( 26 Oct 2022 06:31 )             33.9     Urine Studies:            LIVER FUNCTIONS - ( 25 Oct 2022 04:52 )  Alb: 3.8 g/dL / Pro: 7.8 g/dL / ALK PHOS: 103 U/L / ALT: 19 U/L / AST: 23 U/L / GGT: x           PT/INR - ( 26 Oct 2022 06:31 )   PT: 13.0 sec;   INR: 1.13 ratio         PTT - ( 26 Oct 2022 06:31 )  PTT:84.2 sec    RADIOLOGY & ADDITIONAL TESTS:    Imaging Personally Reviewed:    Consultant(s) Notes Reviewed:      Care Discussed with Consultants/Other Providers:

## 2022-10-26 NOTE — PROGRESS NOTE ADULT - SUBJECTIVE AND OBJECTIVE BOX
PULMONARY PROGRESS NOTE    DAMARI GUERRERO  MRN-[id number]    Patient is a 72y old  Female who presents with a chief complaint of Vomiting (25 Oct 2022 08:05)      HPI:  being cleaned  laying flat on bed with nc  appears comfortable  says she didn't use bipap last night, bipap is at bedside    ROS:   -    MEDICATIONS  (STANDING):  aMIOdarone    Tablet 400 milliGRAM(s) Oral every 8 hours  aMIOdarone    Tablet   Oral   buMETAnide Infusion 1 mG/Hr (5 mL/Hr) IV Continuous <Continuous>  ferrous    sulfate 325 milliGRAM(s) Oral daily  heparin  Infusion 1800 Unit(s)/Hr (11 mL/Hr) IV Continuous <Continuous>  insulin glargine Injectable (LANTUS) 12 Unit(s) SubCutaneous at bedtime  insulin lispro (ADMELOG) corrective regimen sliding scale   SubCutaneous three times a day before meals  insulin lispro (ADMELOG) corrective regimen sliding scale   SubCutaneous at bedtime  isosorbide   mononitrate ER Tablet (IMDUR) 30 milliGRAM(s) Oral daily  lidocaine   4% Patch 1 Patch Transdermal daily  metoprolol succinate ER 50 milliGRAM(s) Oral daily  pantoprazole    Tablet 40 milliGRAM(s) Oral before breakfast  polyethylene glycol 3350 17 Gram(s) Oral daily  predniSONE   Tablet 5 milliGRAM(s) Oral daily  senna 2 Tablet(s) Oral at bedtime  simvastatin 10 milliGRAM(s) Oral at bedtime    MEDICATIONS  (PRN):  acetaminophen     Tablet .. 650 milliGRAM(s) Oral every 6 hours PRN Temp greater or equal to 38C (100.4F), Mild Pain (1 - 3)  ALBUTerol    90 MICROgram(s) HFA Inhaler 2 Puff(s) Inhalation every 6 hours PRN Bronchospasm  melatonin 3 milliGRAM(s) Oral at bedtime PRN Insomnia        EXAM:  Vital Signs Last 24 Hrs  T(C): 36.3 (26 Oct 2022 04:46), Max: 36.7 (25 Oct 2022 18:37)  T(F): 97.3 (26 Oct 2022 04:46), Max: 98 (25 Oct 2022 18:37)  HR: 88 (26 Oct 2022 10:37) (59 - 109)  BP: 106/64 (26 Oct 2022 04:46) (99/72 - 126/72)  BP(mean): --  RR: 18 (26 Oct 2022 04:46) (18 - 20)  SpO2: 100% (26 Oct 2022 10:37) (97% - 100%)    Parameters below as of 26 Oct 2022 04:46  Patient On (Oxygen Delivery Method): nasal cannula  O2 Flow (L/min): 3      Parameters below as of 25 Oct 2022 13:15  Patient On (Oxygen Delivery Method): nasal cannula  O2 Flow (L/min): 4      GENERAL: The patient is awake and alert in no apparent distress.     LUNGS:  respirations unlabored        LABS/IMAGING: reviewed                                   9.7    6.31  )-----------( 317      ( 26 Oct 2022 06:31 )             33.9   10-26    135  |  85<L>  |  106<H>  ----------------------------<  96  4.2   |  40<H>  |  3.17<H>    Ca    9.7      26 Oct 2022 06:31  Phos  4.3     10-26  Mg     2.2     10-26    TPro  7.8  /  Alb  3.8  /  TBili  0.4  /  DBili  x   /  AST  23  /  ALT  19  /  AlkPhos  103  10-25      < from: Xray Chest 1 View-PORTABLE IMMEDIATE (Xray Chest 1 View-PORTABLE IMMEDIATE .) (10.23.22 @ 00:39) >    ACC: [id number] EXAM:  XR CHEST PORTABLE IMMED 1V                          PROCEDURE DATE:  10/23/2022          INTERPRETATION:  EXAMINATION: XR CHEST IMMEDIATE    CLINICAL INDICATION: Dyspnea    TECHNIQUE: Single frontal, portable view of the chest was obtained.    COMPARISON: Chest x-ray 9/6/2022, abdominal CT 10/22/2022    FINDINGS:  Status post median sternotomy.  The heart size is not well evaluated on this projection.  Redemonstrated bilateral hazy opacities similar to prior exam: Pulmonary  edema/interstitial/alveolar infiltrates.  No pneumothorax.    IMPRESSION:  Pulmonary edema/interstitial/alveolar infiltrates, similar to prior exam.    --- End of Report ---           KAZ DALE MD; Resident Radiologist  This document has been electronically signed.  SILVANA GALLARDO DO; Attending Radiologist  This document has been electronically signed. Oct 23 2022  8:27AM    < end of copied text >    PROBLEM LIST:  72y Female with HEALTH ISSUES - PROBLEM Dx:  MIGUEL/OHV    RECS:  -diuresis per cards  -should be on bipap qhs via nasal mask  -monitor sats      Em Merida MD   [phone]

## 2022-10-27 LAB
ANION GAP SERPL CALC-SCNC: 14 MMOL/L — SIGNIFICANT CHANGE UP (ref 5–17)
BUN SERPL-MCNC: 110 MG/DL — HIGH (ref 7–23)
CALCIUM SERPL-MCNC: 10 MG/DL — SIGNIFICANT CHANGE UP (ref 8.4–10.5)
CHLORIDE SERPL-SCNC: 83 MMOL/L — LOW (ref 96–108)
CO2 SERPL-SCNC: 38 MMOL/L — HIGH (ref 22–31)
CREAT SERPL-MCNC: 3.29 MG/DL — HIGH (ref 0.5–1.3)
EGFR: 14 ML/MIN/1.73M2 — LOW
GLUCOSE BLDC GLUCOMTR-MCNC: 138 MG/DL — HIGH (ref 70–99)
GLUCOSE BLDC GLUCOMTR-MCNC: 138 MG/DL — HIGH (ref 70–99)
GLUCOSE BLDC GLUCOMTR-MCNC: 152 MG/DL — HIGH (ref 70–99)
GLUCOSE BLDC GLUCOMTR-MCNC: 192 MG/DL — HIGH (ref 70–99)
GLUCOSE SERPL-MCNC: 108 MG/DL — HIGH (ref 70–99)
INR BLD: 1.14 RATIO — SIGNIFICANT CHANGE UP (ref 0.88–1.16)
POTASSIUM SERPL-MCNC: 4.4 MMOL/L — SIGNIFICANT CHANGE UP (ref 3.5–5.3)
POTASSIUM SERPL-SCNC: 4.4 MMOL/L — SIGNIFICANT CHANGE UP (ref 3.5–5.3)
PROTHROM AB SERPL-ACNC: 13.1 SEC — SIGNIFICANT CHANGE UP (ref 10.5–13.4)
SODIUM SERPL-SCNC: 135 MMOL/L — SIGNIFICANT CHANGE UP (ref 135–145)

## 2022-10-27 PROCEDURE — 99233 SBSQ HOSP IP/OBS HIGH 50: CPT

## 2022-10-27 PROCEDURE — 99232 SBSQ HOSP IP/OBS MODERATE 35: CPT

## 2022-10-27 RX ORDER — WARFARIN SODIUM 2.5 MG/1
5 TABLET ORAL ONCE
Refills: 0 | Status: COMPLETED | OUTPATIENT
Start: 2022-10-27 | End: 2022-10-27

## 2022-10-27 RX ORDER — ACETAMINOPHEN 500 MG
1000 TABLET ORAL ONCE
Refills: 0 | Status: COMPLETED | OUTPATIENT
Start: 2022-10-27 | End: 2022-10-27

## 2022-10-27 RX ADMIN — BUMETANIDE 2 MILLIGRAM(S): 0.25 INJECTION INTRAMUSCULAR; INTRAVENOUS at 05:01

## 2022-10-27 RX ADMIN — SENNA PLUS 2 TABLET(S): 8.6 TABLET ORAL at 21:53

## 2022-10-27 RX ADMIN — Medication 650 MILLIGRAM(S): at 07:20

## 2022-10-27 RX ADMIN — ISOSORBIDE MONONITRATE 30 MILLIGRAM(S): 60 TABLET, EXTENDED RELEASE ORAL at 12:29

## 2022-10-27 RX ADMIN — Medication 325 MILLIGRAM(S): at 12:30

## 2022-10-27 RX ADMIN — INSULIN GLARGINE 12 UNIT(S): 100 INJECTION, SOLUTION SUBCUTANEOUS at 21:52

## 2022-10-27 RX ADMIN — Medication 50 MILLIGRAM(S): at 05:01

## 2022-10-27 RX ADMIN — Medication 650 MILLIGRAM(S): at 04:59

## 2022-10-27 RX ADMIN — LIDOCAINE 1 PATCH: 4 CREAM TOPICAL at 02:41

## 2022-10-27 RX ADMIN — Medication 2: at 18:11

## 2022-10-27 RX ADMIN — Medication 2: at 12:46

## 2022-10-27 RX ADMIN — AMIODARONE HYDROCHLORIDE 200 MILLIGRAM(S): 400 TABLET ORAL at 05:30

## 2022-10-27 RX ADMIN — Medication 400 MILLIGRAM(S): at 21:55

## 2022-10-27 RX ADMIN — WARFARIN SODIUM 5 MILLIGRAM(S): 2.5 TABLET ORAL at 21:53

## 2022-10-27 RX ADMIN — PANTOPRAZOLE SODIUM 40 MILLIGRAM(S): 20 TABLET, DELAYED RELEASE ORAL at 07:23

## 2022-10-27 RX ADMIN — Medication 1000 MILLIGRAM(S): at 22:30

## 2022-10-27 RX ADMIN — LIDOCAINE 1 PATCH: 4 CREAM TOPICAL at 19:30

## 2022-10-27 RX ADMIN — LIDOCAINE 1 PATCH: 4 CREAM TOPICAL at 16:52

## 2022-10-27 RX ADMIN — SIMVASTATIN 10 MILLIGRAM(S): 20 TABLET, FILM COATED ORAL at 21:54

## 2022-10-27 RX ADMIN — Medication 5 MILLIGRAM(S): at 05:01

## 2022-10-27 NOTE — PROGRESS NOTE ADULT - SUBJECTIVE AND OBJECTIVE BOX
SUBJECTIVE / OVERNIGHT EVENTS:pt seen and examined    MEDICATIONS  (STANDING):  aMIOdarone    Tablet 200 milliGRAM(s) Oral daily  aMIOdarone    Tablet   Oral   ferrous    sulfate 325 milliGRAM(s) Oral daily  heparin  Infusion 1800 Unit(s)/Hr (11 mL/Hr) IV Continuous <Continuous>  insulin glargine Injectable (LANTUS) 12 Unit(s) SubCutaneous at bedtime  insulin lispro (ADMELOG) corrective regimen sliding scale   SubCutaneous three times a day before meals  insulin lispro (ADMELOG) corrective regimen sliding scale   SubCutaneous at bedtime  isosorbide   mononitrate ER Tablet (IMDUR) 30 milliGRAM(s) Oral daily  lidocaine   4% Patch 1 Patch Transdermal daily  metoprolol succinate ER 50 milliGRAM(s) Oral daily  pantoprazole    Tablet 40 milliGRAM(s) Oral before breakfast  polyethylene glycol 3350 17 Gram(s) Oral daily  predniSONE   Tablet 5 milliGRAM(s) Oral daily  senna 2 Tablet(s) Oral at bedtime  simvastatin 10 milliGRAM(s) Oral at bedtime    MEDICATIONS  (PRN):  acetaminophen     Tablet .. 650 milliGRAM(s) Oral every 6 hours PRN Temp greater or equal to 38C (100.4F), Mild Pain (1 - 3)  ALBUTerol    90 MICROgram(s) HFA Inhaler 2 Puff(s) Inhalation every 6 hours PRN Bronchospasm  melatonin 3 milliGRAM(s) Oral at bedtime PRN Insomnia    Vital Signs Last 24 Hrs  T(C): 36.3 (10-27-22 @ 20:39), Max: 36.6 (10-27-22 @ 04:49)  T(F): 97.3 (10-27-22 @ 20:39), Max: 97.8 (10-27-22 @ 04:49)  HR: 61 (10-27-22 @ 20:39) (61 - 102)  BP: 121/71 (10-27-22 @ 20:39) (105/70 - 127/71)  BP(mean): 90 (10-27-22 @ 11:06) (90 - 90)  RR: 18 (10-27-22 @ 20:39) (18 - 18)  SpO2: 99% (10-27-22 @ 20:39) (97% - 100%)    Skin: No rash.  Eyes: No recent vision problems or eye pain.  ENT: No congestion, ear pain, or sore throat.  Cardiovascular: No chest pain or palpation.  Respiratory: No cough, shortness of breath, congestion, or wheezing.  Gastrointestinal: No abdominal pain, nausea, vomiting, or diarrhea.  Genitourinary: No dysuria.  Musculoskeletal: No joint swelling.  Neurologic: No headache.    PHYSICAL EXAM:  GENERAL: NAD  EYES: EOMI, PERRLA  NECK: Supple, No JVD  CHEST/LUNG: dec breath sounds at bases  HEART:  S1 , S2 +  ABDOMEN: soft , bs+  EXTREMITIES:  edema+  NEUROLOGY:alert awake oriented      LABS:  10-27    135  |  83<L>  |  110<H>  ----------------------------<  108<H>  4.4   |  38<H>  |  3.29<H>    Ca    10.0      27 Oct 2022 07:15  Phos  4.3     10-26  Mg     2.2     10-26      Creatinine Trend: 3.29 <--, 3.17 <--, 3.02 <--, 2.97 <--, 2.96 <--, 3.01 <--, 2.83 <--, 2.78 <--, 2.87 <--                        9.7    6.31  )-----------( 317      ( 26 Oct 2022 06:31 )             33.9     Urine Studies:              PT/INR - ( 27 Oct 2022 07:12 )   PT: 13.1 sec;   INR: 1.14 ratio         PTT - ( 26 Oct 2022 06:31 )  PTT:84.2 sec        LIVER FUNCTIONS - ( 25 Oct 2022 04:52 )  Alb: 3.8 g/dL / Pro: 7.8 g/dL / ALK PHOS: 103 U/L / ALT: 19 U/L / AST: 23 U/L / GGT: x           PT/INR - ( 26 Oct 2022 06:31 )   PT: 13.0 sec;   INR: 1.13 ratio         PTT - ( 26 Oct 2022 06:31 )  PTT:84.2 sec    RADIOLOGY & ADDITIONAL TESTS:    Imaging Personally Reviewed:    Consultant(s) Notes Reviewed:      Care Discussed with Consultants/Other Providers:

## 2022-10-27 NOTE — PROGRESS NOTE ADULT - ASSESSMENT
71 y/o F with pmhx of COPD on home O2(3L) with severe pHTN, MVR/TVR , AF s/p ablation,  OHS/MIGUEL on home biPAP, HTN, HLD who presents with one week of vomiting with poor oral intake also complaining of SOB and chest pain.  She also states she has been taking prednisone on/off for the past 3 months for chest tightness and does endorse some weight gain.     In the ED pt found to have A Flutter and given Amio 400 po and Dig loaded.     #PULM  - Currently on home 3-4 L NC, No exp wheezing   - CXR likely fluid OL  - Continue proair inhalation  - c/w prednisone 5mg since on chronically for 3 months    #CV  Hx of AF w RVR now w AFlutter  - Not on home AC  - obtain TTE, doesnt appear to be in cardiogenic shock but has pulm edema  - Continue home metoprolol and hydralazine  - responded UOP to metolazone & bumex  -   AFLutter  - Amio loaded  - holding digoxin   - EP following  - Continue Metoprolol    MVR/TV replacement off AC  - echo technically difficult, unable to assess due to habitus and positioning  - per chart review under last name Nancy MRN [id number] - mechanical valve replacement 1999, was previously on coumadin but stopped in 09/2021 after worsening anemia, concern for GI bleed & epistaxis.   - continue with heparin GTT  - may require ELIZABETH(?) if needs further assessment    #GI  - Continue DASH CC diet      #RENAL  - Cr uptrending, now 3.01  - baseline ~1.6  - monitor while diuresing  - renal consult if Cr continues to uptrend & UOP declines    #ID  - NO WBC or fevers, no cough or purulent sputum  - monitor off Abbx for now    #ENDO  - A1c 10.7  - FS ~100-130  - Continue home Lantus and premeal, adjust as appropriate    #HEME  - heparin GTT  - monitor PTT and adjust as appropriate      ETHICS/DISPO:  - Full code  - dispo based on labs/UOP/Cr

## 2022-10-27 NOTE — PROGRESS NOTE ADULT - ASSESSMENT
71 y/o F with pmhx of COPD on home O2(3L) with severe pHTN, MVR/TVR , AF s/p ablation,  OHS/MIGUEL on home biPAP, HTN, HLD who presents with one week of vomiting with poor oral intake also complaining of SOB and chest pain.  She also states she has been taking prednisone on/off for the past 3 months for chest tightness and does endorse some weight gain. Found to be fluid overloaded and with atrial flutter    #HFrEF  - Continue home metoprolol 50mg daily and hydralazine  -Continue with imdur 30mg daily  - responded UOP to metolazone & bumex  - Can hold diuresis today as the patient remains euvolemic and Cr is increasing  -Strict I/O   -Salt and water restriction    #AFLutter  Hx of AF w RVR now w AFlutter  - Not on home AC  - Amio loaded, now on PO amio  - holding digoxin   - EP following  - Continue Metoprolol 50mg daily  - Continue heparin drip for now and start bridging patient to warfarin with a goal INR of 2.5-3    #MVR/TV replacement off AC at home  - echo technically difficult, unable to assess due to habitus and positioning  - per chart review under last name Nancy MRN [id number] - mechanical valve replacement 1999, was previously on coumadin but stopped in 09/2021 after worsening anemia, concern for GI bleed & epistaxis.   - continue with heparin GTT and start bridging patient to warfarin with a goal INR of 2.5-3    Darwin Oliver MD  Cardiology fellow   73 y/o F with pmhx of COPD on home O2(3L) with severe pHTN, MVR/TVR , AF s/p ablation,  OHS/MIGUEL on home biPAP, HTN, HLD who presents with one week of vomiting with poor oral intake also complaining of SOB and chest pain.  She also states she has been taking prednisone on/off for the past 3 months for chest tightness and does endorse some weight gain. Found to be fluid overloaded and with atrial flutter    #HFrEF  - Continue home metoprolol 50mg daily and hydralazine  -Continue with imdur 30mg daily  - responded UOP to metolazone & bumex  - Can hold diuresis today as the patient remains euvolemic and Cr is increasing  -Strict I/O   -Salt and water restriction    #AFLutter  Hx of AF w RVR now w AFlutter  - Not on home AC  - Amio loaded, now on PO amio  - holding digoxin   - EP following  - Continue Metoprolol 50mg daily  - Continue heparin drip for now and start bridging patient to warfarin with a goal INR of 2.5-3    #MVR/TV replacement off AC at home  - echo technically difficult, unable to assess due to habitus and positioning  - per chart review under last name Nancy MRN [id number] - mechanical valve replacement 1999, was previously on coumadin but stopped in 09/2021 after worsening anemia, concern for GI bleed & epistaxis.   - continue with heparin GTT and start bridging patient to warfarin with a goal INR of 2.5-3    Darwin Oliver MD  Cardiology fellow    This patient was seen and examined personally by me and the plan was discussed with the fellow and/or resident above. Amendments were made as necessary to the above. Agree with the excellent note and plan above. Holding diuresis, bridge to coumadin.    Son Dong MD, MPhil, [facility]  Cardiologist, [facility]  ; [employer] [facility] and [facility]  Email: [email or web address] Cardiology and Cardiovascular Surgery on-service contact/call information, go to [email or web address] and use "[email or web address]" to login.  Outpatient Cardiology appointments, call [phone] to arrange with a colleague; I do not have outpatient Cardiology clinic.

## 2022-10-27 NOTE — PROGRESS NOTE ADULT - SUBJECTIVE AND OBJECTIVE BOX
PULMONARY PROGRESS NOTE    DAMARI GUERRERO  MRN-[id number]    Patient is a 72y old  Female who presents with a chief complaint of Vomiting (25 Oct 2022 08:05)      HPI:  family brought her vent from home  breathing feels better      ROS:   -    MEDICATIONS  (STANDING):  aMIOdarone    Tablet 200 milliGRAM(s) Oral daily  aMIOdarone    Tablet   Oral   ferrous    sulfate 325 milliGRAM(s) Oral daily  heparin  Infusion 1800 Unit(s)/Hr (11 mL/Hr) IV Continuous <Continuous>  insulin glargine Injectable (LANTUS) 12 Unit(s) SubCutaneous at bedtime  insulin lispro (ADMELOG) corrective regimen sliding scale   SubCutaneous three times a day before meals  insulin lispro (ADMELOG) corrective regimen sliding scale   SubCutaneous at bedtime  isosorbide   mononitrate ER Tablet (IMDUR) 30 milliGRAM(s) Oral daily  lidocaine   4% Patch 1 Patch Transdermal daily  metoprolol succinate ER 50 milliGRAM(s) Oral daily  pantoprazole    Tablet 40 milliGRAM(s) Oral before breakfast  polyethylene glycol 3350 17 Gram(s) Oral daily  predniSONE   Tablet 5 milliGRAM(s) Oral daily  senna 2 Tablet(s) Oral at bedtime  simvastatin 10 milliGRAM(s) Oral at bedtime  warfarin 5 milliGRAM(s) Oral once    MEDICATIONS  (PRN):  acetaminophen     Tablet .. 650 milliGRAM(s) Oral every 6 hours PRN Temp greater or equal to 38C (100.4F), Mild Pain (1 - 3)  ALBUTerol    90 MICROgram(s) HFA Inhaler 2 Puff(s) Inhalation every 6 hours PRN Bronchospasm  melatonin 3 milliGRAM(s) Oral at bedtime PRN Insomnia        EXAM:  Vital Signs Last 24 Hrs  T(C): 36.4 (27 Oct 2022 11:06), Max: 36.6 (27 Oct 2022 04:49)  T(F): 97.5 (27 Oct 2022 11:06), Max: 97.8 (27 Oct 2022 04:49)  HR: 69 (27 Oct 2022 11:06) (61 - 110)  BP: 127/71 (27 Oct 2022 11:06) (105/70 - 127/71)  BP(mean): 90 (27 Oct 2022 11:06) (90 - 90)  RR: 18 (27 Oct 2022 11:06) (18 - 20)  SpO2: 100% (27 Oct 2022 11:06) (94% - 100%)    Parameters below as of 27 Oct 2022 11:06  Patient On (Oxygen Delivery Method): nasal cannula            GENERAL: The patient is awake and alert in no apparent distress.     LUNGS:  respirations unlabored        LABS/IMAGING: reviewed                                              9.7    6.31  )-----------( 317      ( 26 Oct 2022 06:31 )             33.9   10-27    135  |  83<L>  |  110<H>  ----------------------------<  108<H>  4.4   |  38<H>  |  3.29<H>    Ca    10.0      27 Oct 2022 07:15  Phos  4.3     10-26  Mg     2.2     10-26          < from: Xray Chest 1 View-PORTABLE IMMEDIATE (Xray Chest 1 View-PORTABLE IMMEDIATE .) (10.23.22 @ 00:39) >    ACC: [id number] EXAM:  XR CHEST PORTABLE IMMED 1V                          PROCEDURE DATE:  10/23/2022          INTERPRETATION:  EXAMINATION: XR CHEST IMMEDIATE    CLINICAL INDICATION: Dyspnea    TECHNIQUE: Single frontal, portable view of the chest was obtained.    COMPARISON: Chest x-ray 9/6/2022, abdominal CT 10/22/2022    FINDINGS:  Status post median sternotomy.  The heart size is not well evaluated on this projection.  Redemonstrated bilateral hazy opacities similar to prior exam: Pulmonary  edema/interstitial/alveolar infiltrates.  No pneumothorax.    IMPRESSION:  Pulmonary edema/interstitial/alveolar infiltrates, similar to prior exam.    --- End of Report ---           KAZ DALE MD; Resident Radiologist  This document has been electronically signed.  SILVANA GALLARDO DO; Attending Radiologist  This document has been electronically signed. Oct 23 2022  8:27AM    < end of copied text >    PROBLEM LIST:  72y Female with HEALTH ISSUES - PROBLEM Dx:  MIGUEL/OHV    RECS:  -diuresis per cards  -use home vent qhs   -monitor sats      Em Merida MD   [phone]

## 2022-10-27 NOTE — PROGRESS NOTE ADULT - SUBJECTIVE AND OBJECTIVE BOX
Patient seen and examined at bedside.    73 y/o F with pmhx of COPD on home O2(3L) with severe pHTN, MVR/TVR , AF s/p ablation,  OHS/MIGUEL on home biPAP, HTN, HLD who presents with one week of vomiting with poor oral intake also complaining of SOB and chest pain.  She also states she has been taking prednisone on/off for the past 3 months for chest tightness and does endorse some weight gain.     In the ED pt found to have A Flutter and given Amio 400 po and Dig loaded.     CCU COURSE: In the CCU, pt was diuresed w/ Bumex gtt and metolazone. Dig held and EP following. On amio. Pt endorses decrease in chest pain. Pt on home O2 requirement of 3-4L.     Overnight Events: Patient comfortable this morning. Started on warfarin. Switched to PO bumex and is net negative 2.7L.    Review Of Systems: No chest pain, shortness of breath, or palpitations            Current Meds:  acetaminophen     Tablet .. 650 milliGRAM(s) Oral every 6 hours PRN  ALBUTerol    90 MICROgram(s) HFA Inhaler 2 Puff(s) Inhalation every 6 hours PRN  aMIOdarone    Tablet 200 milliGRAM(s) Oral daily  aMIOdarone    Tablet   Oral   buMETAnide 2 milliGRAM(s) Oral two times a day  ferrous    sulfate 325 milliGRAM(s) Oral daily  heparin  Infusion 1800 Unit(s)/Hr IV Continuous <Continuous>  insulin glargine Injectable (LANTUS) 12 Unit(s) SubCutaneous at bedtime  insulin lispro (ADMELOG) corrective regimen sliding scale   SubCutaneous three times a day before meals  insulin lispro (ADMELOG) corrective regimen sliding scale   SubCutaneous at bedtime  isosorbide   mononitrate ER Tablet (IMDUR) 30 milliGRAM(s) Oral daily  lidocaine   4% Patch 1 Patch Transdermal daily  melatonin 3 milliGRAM(s) Oral at bedtime PRN  metoprolol succinate ER 50 milliGRAM(s) Oral daily  pantoprazole    Tablet 40 milliGRAM(s) Oral before breakfast  polyethylene glycol 3350 17 Gram(s) Oral daily  predniSONE   Tablet 5 milliGRAM(s) Oral daily  senna 2 Tablet(s) Oral at bedtime  simvastatin 10 milliGRAM(s) Oral at bedtime      Vitals:  T(F): 97.8 (10-27), Max: 98 (10-26)  HR: 61 (10-27) (61 - 110)  BP: 105/70 (10-27) (105/70 - 121/76)  RR: 18 (10-27)  SpO2: 99% (10-27)  I&O's Summary    25 Oct 2022 07:01  -  26 Oct 2022 07:00  --------------------------------------------------------  IN: 240 mL / OUT: 2950 mL / NET: -2710 mL    26 Oct 2022 07:01  -  27 Oct 2022 06:33  --------------------------------------------------------  IN: 579 mL / OUT: 2700 mL / NET: -2121 mL        Physical Exam:  Appearance: No acute distress; well appearing  Eyes: PERRL, EOMI, pink conjunctiva  HEENT: Normal oral mucosa. Difficult to assess JVD  Cardiovascular: RRR, S1, S2, no murmurs, rubs, or gallops; no edema; Trace pitting edema  Respiratory: No crackles can be heard compared to yesterday  Gastrointestinal: soft, non-tender, non-distended with normal bowel sounds  Musculoskeletal: No clubbing; no joint deformity   Neurologic: Non-focal  Lymphatic: No lymphadenopathy  Psychiatry: AAOx3, mood & affect appropriate  Skin: No rashes, ecchymoses, or cyanosis                          9.7    6.31  )-----------( 317      ( 26 Oct 2022 06:31 )             33.9     10-26    135  |  85<L>  |  106<H>  ----------------------------<  96  4.2   |  40<H>  |  3.17<H>    Ca    9.7      26 Oct 2022 06:31  Phos  4.3     10-26  Mg     2.2     10-26      PT/INR - ( 26 Oct 2022 06:31 )   PT: 13.0 sec;   INR: 1.13 ratio         PTT - ( 26 Oct 2022 06:31 )  PTT:84.2 sec  CARDIAC MARKERS ( 23 Oct 2022 00:25 )  61 ng/L / x     / x     / x     / x     / x          Serum Pro-Brain Natriuretic Peptide: 99608 pg/mL (10-24 @ 15:11)  Serum Pro-Brain Natriuretic Peptide: 06137 pg/mL (10-24 @ 01:36)  Serum Pro-Brain Natriuretic Peptide: 77464 pg/mL (10-23 @ 00:25)  Serum Pro-Brain Natriuretic Peptide: 80913 pg/mL (10-22 @ 19:55)          New ECG(s): Personally reviewed    Echo:    CONCLUSIONS:  Technically very difficult study.  1. Mechanical prosthetic mitral valve replacement, which  was poorly visualized.  2.Endocardium not well visualized; grossly mild to  moderate global left ventricular systolic dysfunction.  Endocardial visualization enhanced with intravenous  injection of Ultrasonic Enhancing Agent (Definity).  3. The right ventricle is not well visualized.  ------------------------------------------------------------------------  Confirmed on  10/23/2022 - 13:29:40 by Montana Azevedo M.D.,  [facility], [facility]    Imaging:    Interpretation of Telemetry:

## 2022-10-28 DIAGNOSIS — I27.20 PULMONARY HYPERTENSION, UNSPECIFIED: ICD-10-CM

## 2022-10-28 DIAGNOSIS — I50.43 ACUTE ON CHRONIC COMBINED SYSTOLIC (CONGESTIVE) AND DIASTOLIC (CONGESTIVE) HEART FAILURE: ICD-10-CM

## 2022-10-28 DIAGNOSIS — I48.92 UNSPECIFIED ATRIAL FLUTTER: ICD-10-CM

## 2022-10-28 LAB
ANION GAP SERPL CALC-SCNC: 13 MMOL/L — SIGNIFICANT CHANGE UP (ref 5–17)
APTT BLD: 126.6 SEC — CRITICAL HIGH (ref 27.5–35.5)
APTT BLD: 71.7 SEC — HIGH (ref 27.5–35.5)
APTT BLD: 73 SEC — HIGH (ref 27.5–35.5)
BUN SERPL-MCNC: 114 MG/DL — HIGH (ref 7–23)
CALCIUM SERPL-MCNC: 9.8 MG/DL — SIGNIFICANT CHANGE UP (ref 8.4–10.5)
CHLORIDE SERPL-SCNC: 83 MMOL/L — LOW (ref 96–108)
CO2 SERPL-SCNC: 38 MMOL/L — HIGH (ref 22–31)
CREAT SERPL-MCNC: 3.46 MG/DL — HIGH (ref 0.5–1.3)
EGFR: 13 ML/MIN/1.73M2 — LOW
GLUCOSE BLDC GLUCOMTR-MCNC: 120 MG/DL — HIGH (ref 70–99)
GLUCOSE BLDC GLUCOMTR-MCNC: 132 MG/DL — HIGH (ref 70–99)
GLUCOSE BLDC GLUCOMTR-MCNC: 138 MG/DL — HIGH (ref 70–99)
GLUCOSE BLDC GLUCOMTR-MCNC: 140 MG/DL — HIGH (ref 70–99)
GLUCOSE BLDC GLUCOMTR-MCNC: 159 MG/DL — HIGH (ref 70–99)
GLUCOSE SERPL-MCNC: 95 MG/DL — SIGNIFICANT CHANGE UP (ref 70–99)
HCT VFR BLD CALC: 32.3 % — LOW (ref 34.5–45)
HGB BLD-MCNC: 9.5 G/DL — LOW (ref 11.5–15.5)
INR BLD: 1.19 RATIO — HIGH (ref 0.88–1.16)
MAGNESIUM SERPL-MCNC: 2 MG/DL — SIGNIFICANT CHANGE UP (ref 1.6–2.6)
MCHC RBC-ENTMCNC: 29.3 PG — SIGNIFICANT CHANGE UP (ref 27–34)
MCHC RBC-ENTMCNC: 29.4 GM/DL — LOW (ref 32–36)
MCV RBC AUTO: 99.7 FL — SIGNIFICANT CHANGE UP (ref 80–100)
NRBC # BLD: 0 /100 WBCS — SIGNIFICANT CHANGE UP (ref 0–0)
PLATELET # BLD AUTO: 272 K/UL — SIGNIFICANT CHANGE UP (ref 150–400)
POTASSIUM SERPL-MCNC: 4.6 MMOL/L — SIGNIFICANT CHANGE UP (ref 3.5–5.3)
POTASSIUM SERPL-SCNC: 4.6 MMOL/L — SIGNIFICANT CHANGE UP (ref 3.5–5.3)
PROTHROM AB SERPL-ACNC: 13.7 SEC — HIGH (ref 10.5–13.4)
RBC # BLD: 3.24 M/UL — LOW (ref 3.8–5.2)
RBC # FLD: 15.6 % — HIGH (ref 10.3–14.5)
SODIUM SERPL-SCNC: 134 MMOL/L — LOW (ref 135–145)
WBC # BLD: 6.64 K/UL — SIGNIFICANT CHANGE UP (ref 3.8–10.5)
WBC # FLD AUTO: 6.64 K/UL — SIGNIFICANT CHANGE UP (ref 3.8–10.5)

## 2022-10-28 PROCEDURE — 99232 SBSQ HOSP IP/OBS MODERATE 35: CPT

## 2022-10-28 PROCEDURE — 99233 SBSQ HOSP IP/OBS HIGH 50: CPT

## 2022-10-28 RX ORDER — ACETAMINOPHEN 500 MG
1000 TABLET ORAL ONCE
Refills: 0 | Status: COMPLETED | OUTPATIENT
Start: 2022-10-28 | End: 2022-10-28

## 2022-10-28 RX ORDER — WARFARIN SODIUM 2.5 MG/1
5 TABLET ORAL AT BEDTIME
Refills: 0 | Status: COMPLETED | OUTPATIENT
Start: 2022-10-28 | End: 2022-10-28

## 2022-10-28 RX ORDER — ONDANSETRON 8 MG/1
4 TABLET, FILM COATED ORAL ONCE
Refills: 0 | Status: COMPLETED | OUTPATIENT
Start: 2022-10-28 | End: 2022-10-28

## 2022-10-28 RX ADMIN — HEPARIN SODIUM 9 UNIT(S)/HR: 5000 INJECTION INTRAVENOUS; SUBCUTANEOUS at 06:52

## 2022-10-28 RX ADMIN — Medication 30 MILLILITER(S): at 20:57

## 2022-10-28 RX ADMIN — SIMVASTATIN 10 MILLIGRAM(S): 20 TABLET, FILM COATED ORAL at 21:24

## 2022-10-28 RX ADMIN — WARFARIN SODIUM 5 MILLIGRAM(S): 2.5 TABLET ORAL at 21:24

## 2022-10-28 RX ADMIN — LIDOCAINE 1 PATCH: 4 CREAM TOPICAL at 04:57

## 2022-10-28 RX ADMIN — Medication 325 MILLIGRAM(S): at 12:28

## 2022-10-28 RX ADMIN — Medication 400 MILLIGRAM(S): at 14:10

## 2022-10-28 RX ADMIN — LIDOCAINE 1 PATCH: 4 CREAM TOPICAL at 19:43

## 2022-10-28 RX ADMIN — HEPARIN SODIUM 9 UNIT(S)/HR: 5000 INJECTION INTRAVENOUS; SUBCUTANEOUS at 22:38

## 2022-10-28 RX ADMIN — HEPARIN SODIUM 9 UNIT(S)/HR: 5000 INJECTION INTRAVENOUS; SUBCUTANEOUS at 14:45

## 2022-10-28 RX ADMIN — AMIODARONE HYDROCHLORIDE 200 MILLIGRAM(S): 400 TABLET ORAL at 05:28

## 2022-10-28 RX ADMIN — Medication 3 MILLIGRAM(S): at 01:24

## 2022-10-28 RX ADMIN — ISOSORBIDE MONONITRATE 30 MILLIGRAM(S): 60 TABLET, EXTENDED RELEASE ORAL at 12:28

## 2022-10-28 RX ADMIN — Medication 5 MILLIGRAM(S): at 05:29

## 2022-10-28 RX ADMIN — Medication 50 MILLIGRAM(S): at 05:27

## 2022-10-28 RX ADMIN — PANTOPRAZOLE SODIUM 40 MILLIGRAM(S): 20 TABLET, DELAYED RELEASE ORAL at 05:27

## 2022-10-28 RX ADMIN — LIDOCAINE 1 PATCH: 4 CREAM TOPICAL at 13:11

## 2022-10-28 RX ADMIN — Medication 1000 MILLIGRAM(S): at 18:12

## 2022-10-28 RX ADMIN — ONDANSETRON 4 MILLIGRAM(S): 8 TABLET, FILM COATED ORAL at 16:50

## 2022-10-28 RX ADMIN — INSULIN GLARGINE 12 UNIT(S): 100 INJECTION, SOLUTION SUBCUTANEOUS at 21:45

## 2022-10-28 NOTE — SWALLOW BEDSIDE ASSESSMENT ADULT - SWALLOW EVAL: CRITERIA FOR SKILLED INTERVENTION MET
This service will sign off. Please re-consult if any concerns regarding dysphagia arise./no problems identified which require skilled intervention

## 2022-10-28 NOTE — PROGRESS NOTE ADULT - SUBJECTIVE AND OBJECTIVE BOX
INTERVAL EVENTS/SUBJ:  rising Cr    Home Medications:  ferrous sulfate 324 mg (65 mg elemental iron) oral tablet: 1 tab(s) orally once a day (24 Aug 2022 10:19)  ProAir HFA 90 mcg/inh inhalation aerosol: 2 puff(s) inhaled every 6 hours (24 Aug 2022 10:19)  simvastatin 10 mg oral tablet: 1 tab(s) orally once a day (at bedtime) (24 Aug 2022 10:19)      MEDICATIONS  (STANDING):  aMIOdarone    Tablet 200 milliGRAM(s) Oral daily  aMIOdarone    Tablet   Oral   ferrous    sulfate 325 milliGRAM(s) Oral daily  heparin  Infusion 1800 Unit(s)/Hr (9 mL/Hr) IV Continuous <Continuous>  insulin glargine Injectable (LANTUS) 12 Unit(s) SubCutaneous at bedtime  insulin lispro (ADMELOG) corrective regimen sliding scale   SubCutaneous three times a day before meals  insulin lispro (ADMELOG) corrective regimen sliding scale   SubCutaneous at bedtime  isosorbide   mononitrate ER Tablet (IMDUR) 30 milliGRAM(s) Oral daily  lidocaine   4% Patch 1 Patch Transdermal daily  metoprolol succinate ER 50 milliGRAM(s) Oral daily  pantoprazole    Tablet 40 milliGRAM(s) Oral before breakfast  polyethylene glycol 3350 17 Gram(s) Oral daily  predniSONE   Tablet 5 milliGRAM(s) Oral daily  senna 2 Tablet(s) Oral at bedtime  simvastatin 10 milliGRAM(s) Oral at bedtime    MEDICATIONS  (PRN):  acetaminophen     Tablet .. 650 milliGRAM(s) Oral every 6 hours PRN Temp greater or equal to 38C (100.4F), Mild Pain (1 - 3)  ALBUTerol    90 MICROgram(s) HFA Inhaler 2 Puff(s) Inhalation every 6 hours PRN Bronchospasm  melatonin 3 milliGRAM(s) Oral at bedtime PRN Insomnia      Vital Signs Last 24 Hrs  T(C): 36.6 (28 Oct 2022 04:12), Max: 36.6 (28 Oct 2022 04:12)  T(F): 97.8 (28 Oct 2022 04:12), Max: 97.8 (28 Oct 2022 04:12)  HR: 66 (28 Oct 2022 09:00) (61 - 80)  BP: 125/79 (28 Oct 2022 04:12) (121/71 - 127/71)  BP(mean): 90 (27 Oct 2022 11:06) (90 - 90)  RR: 18 (28 Oct 2022 04:12) (18 - 18)  SpO2: 100% (28 Oct 2022 09:00) (97% - 100%)    Parameters below as of 28 Oct 2022 04:12  Patient On (Oxygen Delivery Method): nasal cannula        REVIEW OF SYSTEMS:  As per HPI, otherwise unremarkable.     PHYSICAL EXAM:  Constitutional/Appearance: Normal, Well-developed  HEENT:   Normal oral mucosa, no drainage or redness, supple neck  Lymphatic: No lymphadenopathy  Cardiovascular: Normal S1 S2, No edema, II/VI EMILY  Respiratory: Lungs clear to auscultation, respirations non-labored  Psychiatry: A & O x 3, appropriate affect.   Gastrointestinal:  Soft, Non-tender, no distention  Skin: No rashes, No ecchymoses, No cyanosis	  Neurologic: Non-focal, Alert and oriented x 3  Extremities: Normal range of motion  Vascular: Peripheral pulses palpable 2+ bilaterally (radial)    LABS:  CBC Full  -  ( 28 Oct 2022 06:03 )  WBC Count : 6.64 K/uL  RBC Count : 3.24 M/uL  Hemoglobin : 9.5 g/dL  Hematocrit : 32.3 %  Platelet Count - Automated : 272 K/uL  Mean Cell Volume : 99.7 fl  Mean Cell Hemoglobin : 29.3 pg  Mean Cell Hemoglobin Concentration : 29.4 gm/dL  Auto Neutrophil # : x  Auto Lymphocyte # : x  Auto Monocyte # : x  Auto Eosinophil # : x  Auto Basophil # : x  Auto Neutrophil % : x  Auto Lymphocyte % : x  Auto Monocyte % : x  Auto Eosinophil % : x  Auto Basophil % : x      10-28    134<L>  |  83<L>  |  114<H>  ----------------------------<  95  4.6   |  38<H>  |  3.46<H>    Ca    9.8      28 Oct 2022 06:02  Mg     2.0     10-28    IMPRESSION AND PLAN: 72F w COPD, severe PH, MVR/TVR, AF s/p ablation, MIGUEL on bipap, HTN, HL here w FTT and CP. Now w ADHF and AFlutter.  -cont metoprolol 50mg daily  -cont hydralazine  -cont imdur 30  -hold diuresis today for rising Cr (3.46 <- 3.29 <- 3/17)  -cont PO amiodarone  -appreciate EP recs  -warfarin bridge for a/c, close monitoring for bleed      ***    Son Dong MD, MPhil, [facility]  Cardiologist, [facility]  ; [employer] [facility]  email: [email or web address] Cardiology and Cardiovascular Surgery on-service contact/call information, go to [email or web address] and use "[email or web address]" to login.  Outpatient Cardiology appointments, call  [phone] to arrange with a colleague; I do not have outpatient Cardiology clinic.

## 2022-10-28 NOTE — SWALLOW BEDSIDE ASSESSMENT ADULT - SLP GENERAL OBSERVATIONS
Pt was received at bedside eating breakfast. +NC (4L) (on 3L home O2). She was AAOx4, pleasant, and cooperative. Able to follow all commands and answer all questions for purposes of evaluation.

## 2022-10-28 NOTE — SWALLOW BEDSIDE ASSESSMENT ADULT - SWALLOW EVAL: DIAGNOSIS
73 y/o F p/w x1 week of vomiting with poor oral intake as well as SOB and chest pain, found to be fluid overloaded and with Aflutter. Pt was seen today for Bedside Swallow Evaluation which revealed a functional nemesio-pharyngeal swallow mechanism. Timely mastication of soft/hard solids. Timely trigger of pharyngeal swallow. No overt clinical s/s of aspiration or penetration across all trials of thin liquids, puree, and soft/hard solids. Pt denied dysphagia. Endorsed decreased appetite PTA.

## 2022-10-28 NOTE — SWALLOW BEDSIDE ASSESSMENT ADULT - COMMENTS
IMAGING:  CXR: 10/23/22  IMPRESSION:  Pulmonary edema/interstitial/alveolar infiltrates, similar to prior exam.    CT ABD/PELVIS: 10/22/22  IMPRESSION:  Motion degraded exam. No acute abdominal pathology. Mild bibasilar pulmonary edema.    Pt is unknown to this service.

## 2022-10-28 NOTE — PROGRESS NOTE ADULT - SUBJECTIVE AND OBJECTIVE BOX
SUBJECTIVE / OVERNIGHT EVENTS:pt seen and examined    MEDICATIONS  (STANDING):  aMIOdarone    Tablet 200 milliGRAM(s) Oral daily  aMIOdarone    Tablet   Oral   ferrous    sulfate 325 milliGRAM(s) Oral daily  heparin  Infusion 1800 Unit(s)/Hr (9 mL/Hr) IV Continuous <Continuous>  insulin glargine Injectable (LANTUS) 12 Unit(s) SubCutaneous at bedtime  insulin lispro (ADMELOG) corrective regimen sliding scale   SubCutaneous three times a day before meals  insulin lispro (ADMELOG) corrective regimen sliding scale   SubCutaneous at bedtime  isosorbide   mononitrate ER Tablet (IMDUR) 30 milliGRAM(s) Oral daily  lidocaine   4% Patch 1 Patch Transdermal daily  metoprolol succinate ER 50 milliGRAM(s) Oral daily  pantoprazole    Tablet 40 milliGRAM(s) Oral before breakfast  polyethylene glycol 3350 17 Gram(s) Oral daily  predniSONE   Tablet 5 milliGRAM(s) Oral daily  senna 2 Tablet(s) Oral at bedtime  simvastatin 10 milliGRAM(s) Oral at bedtime    MEDICATIONS  (PRN):  acetaminophen     Tablet .. 650 milliGRAM(s) Oral every 6 hours PRN Temp greater or equal to 38C (100.4F), Mild Pain (1 - 3)  ALBUTerol    90 MICROgram(s) HFA Inhaler 2 Puff(s) Inhalation every 6 hours PRN Bronchospasm  aluminum hydroxide/magnesium hydroxide/simethicone Suspension 30 milliLiter(s) Oral every 4 hours PRN Dyspepsia  melatonin 3 milliGRAM(s) Oral at bedtime PRN Insomnia    Vital Signs Last 24 Hrs  T(C): 36.3 (10-28-22 @ 21:18), Max: 36.6 (10-28-22 @ 04:12)  T(F): 97.3 (10-28-22 @ 21:18), Max: 97.8 (10-28-22 @ 04:12)  HR: 85 (10-28-22 @ 23:31) (45 - 85)  BP: 127/86 (10-28-22 @ 21:18) (115/61 - 127/86)  BP(mean): 79 (10-28-22 @ 11:53) (79 - 79)  RR: 18 (10-28-22 @ 21:18) (18 - 18)  SpO2: 100% (10-28-22 @ 23:31) (97% - 100%)        Skin: No rash.  Eyes: No recent vision problems or eye pain.  ENT: No congestion, ear pain, or sore throat.  Cardiovascular: No chest pain or palpation.  Respiratory: No cough, shortness of breath, congestion, or wheezing.  Gastrointestinal: No abdominal pain, nausea, vomiting, or diarrhea.  Genitourinary: No dysuria.  Musculoskeletal: No joint swelling.  Neurologic: No headache.    PHYSICAL EXAM:  GENERAL: NAD  EYES: EOMI, PERRLA  NECK: Supple, No JVD  CHEST/LUNG: dec breath sounds at bases  HEART:  S1 , S2 +  ABDOMEN: soft , bs+  EXTREMITIES:  edema+  NEUROLOGY:alert awake oriented    LABS:  10-28    134<L>  |  83<L>  |  114<H>  ----------------------------<  95  4.6   |  38<H>  |  3.46<H>    Ca    9.8      28 Oct 2022 06:02  Mg     2.0     10-28      Creatinine Trend: 3.46 <--, 3.29 <--, 3.17 <--, 3.02 <--, 2.97 <--, 2.96 <--, 3.01 <--, 2.83 <--, 2.78 <--, 2.87 <--                        9.5    6.64  )-----------( 272      ( 28 Oct 2022 06:03 )             32.3     Urine Studies:              PT/INR - ( 28 Oct 2022 06:04 )   PT: 13.7 sec;   INR: 1.19 ratio         PTT - ( 28 Oct 2022 21:31 )  PTT:73.0 sec          PT/INR - ( 27 Oct 2022 07:12 )   PT: 13.1 sec;   INR: 1.14 ratio         PTT - ( 26 Oct 2022 06:31 )  PTT:84.2 sec        LIVER FUNCTIONS - ( 25 Oct 2022 04:52 )  Alb: 3.8 g/dL / Pro: 7.8 g/dL / ALK PHOS: 103 U/L / ALT: 19 U/L / AST: 23 U/L / GGT: x           PT/INR - ( 26 Oct 2022 06:31 )   PT: 13.0 sec;   INR: 1.13 ratio         PTT - ( 26 Oct 2022 06:31 )  PTT:84.2 sec    RADIOLOGY & ADDITIONAL TESTS:    Imaging Personally Reviewed:    Consultant(s) Notes Reviewed:      Care Discussed with Consultants/Other Providers:

## 2022-10-28 NOTE — SWALLOW BEDSIDE ASSESSMENT ADULT - SLP PERTINENT HISTORY OF CURRENT PROBLEM
71 y/o F with PMH of COPD on home O2 (3L during day, BiPAP at night) with severe pHTN, MVR/TVR , AF s/p ablation, OHS/MIGUEL on home biPAP, HTN, and HLD who presents with one week of vomiting with poor oral intake also complaining of SOB and chest pain. She also states she has been taking prednisone on/off for the past 3 months for chest tightness and does endorse some weight gain. In the ED pt found to have A Flutter and given Amio 400 po and Dig loaded. CXR likely fluid overload. Admitted to CCU. Given bumex during day with appropriate response. Patient is persistently tachycardic and appears to be in aflutter. EP consulted. Patient feels improved in terms of breathing - does have very poor dyspnea at baseline. s/p transfer to [facility] [place] floors 10/24.

## 2022-10-28 NOTE — PROGRESS NOTE ADULT - SUBJECTIVE AND OBJECTIVE BOX
PULMONARY PROGRESS NOTE    DAMARI GUERRERO  MRN-[id number]    Patient is a 72y old  Female who presents with a chief complaint of Vomiting (25 Oct 2022 08:05)      HPI:  family brought her vent from home, still did not use it bc was not cleared by respiratory?  breathing feels better      ROS:   -    MEDICATIONS  (STANDING):  aMIOdarone    Tablet 200 milliGRAM(s) Oral daily  aMIOdarone    Tablet   Oral   ferrous    sulfate 325 milliGRAM(s) Oral daily  heparin  Infusion 1800 Unit(s)/Hr (9 mL/Hr) IV Continuous <Continuous>  insulin glargine Injectable (LANTUS) 12 Unit(s) SubCutaneous at bedtime  insulin lispro (ADMELOG) corrective regimen sliding scale   SubCutaneous three times a day before meals  insulin lispro (ADMELOG) corrective regimen sliding scale   SubCutaneous at bedtime  isosorbide   mononitrate ER Tablet (IMDUR) 30 milliGRAM(s) Oral daily  lidocaine   4% Patch 1 Patch Transdermal daily  metoprolol succinate ER 50 milliGRAM(s) Oral daily  pantoprazole    Tablet 40 milliGRAM(s) Oral before breakfast  polyethylene glycol 3350 17 Gram(s) Oral daily  predniSONE   Tablet 5 milliGRAM(s) Oral daily  senna 2 Tablet(s) Oral at bedtime  simvastatin 10 milliGRAM(s) Oral at bedtime    MEDICATIONS  (PRN):  acetaminophen     Tablet .. 650 milliGRAM(s) Oral every 6 hours PRN Temp greater or equal to 38C (100.4F), Mild Pain (1 - 3)  ALBUTerol    90 MICROgram(s) HFA Inhaler 2 Puff(s) Inhalation every 6 hours PRN Bronchospasm  melatonin 3 milliGRAM(s) Oral at bedtime PRN Insomnia      EXAM:  Vital Signs Last 24 Hrs  T(C): 36.6 (28 Oct 2022 04:12), Max: 36.6 (28 Oct 2022 04:12)  T(F): 97.8 (28 Oct 2022 04:12), Max: 97.8 (28 Oct 2022 04:12)  HR: 66 (28 Oct 2022 09:00) (61 - 80)  BP: 125/79 (28 Oct 2022 04:12) (121/71 - 127/71)  BP(mean): 90 (27 Oct 2022 11:06) (90 - 90)  RR: 18 (28 Oct 2022 04:12) (18 - 18)  SpO2: 100% (28 Oct 2022 09:00) (97% - 100%)    Parameters below as of 28 Oct 2022 04:12  Patient On (Oxygen Delivery Method): nasal cannula            GENERAL: The patient is awake and alert in no apparent distress.     LUNGS:  respirations unlabored        LABS/IMAGING: reviewed                                                         9.5    6.64  )-----------( 272      ( 28 Oct 2022 06:03 )             32.3   10-28    134<L>  |  83<L>  |  114<H>  ----------------------------<  95  4.6   |  38<H>  |  3.46<H>    Ca    9.8      28 Oct 2022 06:02  Mg     2.0     10-28          < from: Xray Chest 1 View-PORTABLE IMMEDIATE (Xray Chest 1 View-PORTABLE IMMEDIATE .) (10.23.22 @ 00:39) >    ACC: [id number] EXAM:  XR CHEST PORTABLE IMMED 1V                          PROCEDURE DATE:  10/23/2022          INTERPRETATION:  EXAMINATION: XR CHEST IMMEDIATE    CLINICAL INDICATION: Dyspnea    TECHNIQUE: Single frontal, portable view of the chest was obtained.    COMPARISON: Chest x-ray 9/6/2022, abdominal CT 10/22/2022    FINDINGS:  Status post median sternotomy.  The heart size is not well evaluated on this projection.  Redemonstrated bilateral hazy opacities similar to prior exam: Pulmonary  edema/interstitial/alveolar infiltrates.  No pneumothorax.    IMPRESSION:  Pulmonary edema/interstitial/alveolar infiltrates, similar to prior exam.    --- End of Report ---           KAZ DALE MD; Resident Radiologist  This document has been electronically signed.  SILVANA GALLARDO DO; Attending Radiologist  This document has been electronically signed. Oct 23 2022  8:27AM    < end of copied text >    PROBLEM LIST:  72y Female with HEALTH ISSUES - PROBLEM Dx:  MIGUEL/OHV    RECS:  -diuresis per cards  -use home vent qhs   -monitor sats      Em Merida MD   [phone]

## 2022-10-28 NOTE — SWALLOW BEDSIDE ASSESSMENT ADULT - SPECIFY REASON(S)
to clinically evaluate pt's nemesio-pharyngeal swallow mechanism and r/o dysphagia. Per consult, "decreased PO, dysphagia."

## 2022-10-28 NOTE — PROGRESS NOTE ADULT - ASSESSMENT
71 y/o F with pmhx of COPD on home O2(3L) with severe pHTN, MVR/TVR , AF s/p ablation,  OHS/MIGUEL on home biPAP, HTN, HLD who presents with one week of vomiting with poor oral intake also complaining of SOB and chest pain.  She also states she has been taking prednisone on/off for the past 3 months for chest tightness and does endorse some weight gain.     In the ED pt found to have A Flutter and given Amio 400 po and Dig loaded.     #PULM  - Currently on home 3-4 L NC, No exp wheezing   - CXR likely fluid OL  - Continue proair inhalation  - c/w prednisone 5mg since on chronically for 3 months    #CV  Hx of AF w RVR now w AFlutter  - Not on home AC  - obtain TTE, doesnt appear to be in cardiogenic shock but has pulm edema  - Continue home metoprolol and hydralazine  - responded UOP to metolazone & bumex  -   AFLutter  - Amio loaded  - holding digoxin   - EP following  - Continue Metoprolol    MVR/TV replacement off AC  - echo technically difficult, unable to assess due to habitus and positioning  - per chart review under last name Nancy MRN [id number] - mechanical valve replacement 1999, was previously on coumadin but stopped in 09/2021 after worsening anemia, concern for GI bleed & epistaxis.   - continue with heparin GTT  - may require ELIZABETH(?) if needs further assessment    #GI  - Continue DASH CC diet      #RENAL  - Cr uptrending, now 3.01  - baseline ~1.6  - monitor while diuresing  - renal consult if Cr continues to uptrend & UOP declines    #ID  - NO WBC or fevers, no cough or purulent sputum  - monitor off Abbx for now    #ENDO  - A1c 10.7  - FS ~100-130  - Continue home Lantus and premeal, adjust as appropriate    #HEME  - heparin GTT  - monitor PTT and adjust as appropriate      ETHICS/DISPO:  - Full code  - dispo based on labs/UOP/Cr

## 2022-10-28 NOTE — SWALLOW BEDSIDE ASSESSMENT ADULT - SWALLOW EVAL: PATIENT/FAMILY GOALS STATEMENT
"I don't have problems swallowing. I just didn't have an appetite." She denied dysphagia, globus sensation, odynophagia, or h/o PNA.

## 2022-10-29 LAB
ANION GAP SERPL CALC-SCNC: 11 MMOL/L — SIGNIFICANT CHANGE UP (ref 5–17)
ANION GAP SERPL CALC-SCNC: 6 MMOL/L — SIGNIFICANT CHANGE UP (ref 5–17)
APTT BLD: 55.9 SEC — HIGH (ref 27.5–35.5)
BUN SERPL-MCNC: 102 MG/DL — HIGH (ref 7–23)
BUN SERPL-MCNC: 107 MG/DL — HIGH (ref 7–23)
CALCIUM SERPL-MCNC: 10.4 MG/DL — SIGNIFICANT CHANGE UP (ref 8.4–10.5)
CALCIUM SERPL-MCNC: 10.4 MG/DL — SIGNIFICANT CHANGE UP (ref 8.4–10.5)
CHLORIDE SERPL-SCNC: 86 MMOL/L — LOW (ref 96–108)
CHLORIDE SERPL-SCNC: 87 MMOL/L — LOW (ref 96–108)
CO2 SERPL-SCNC: 37 MMOL/L — HIGH (ref 22–31)
CO2 SERPL-SCNC: 43 MMOL/L — HIGH (ref 22–31)
CREAT SERPL-MCNC: 3.01 MG/DL — HIGH (ref 0.5–1.3)
CREAT SERPL-MCNC: 3.16 MG/DL — HIGH (ref 0.5–1.3)
EGFR: 15 ML/MIN/1.73M2 — LOW
EGFR: 16 ML/MIN/1.73M2 — LOW
GLUCOSE BLDC GLUCOMTR-MCNC: 113 MG/DL — HIGH (ref 70–99)
GLUCOSE BLDC GLUCOMTR-MCNC: 114 MG/DL — HIGH (ref 70–99)
GLUCOSE BLDC GLUCOMTR-MCNC: 119 MG/DL — HIGH (ref 70–99)
GLUCOSE BLDC GLUCOMTR-MCNC: 156 MG/DL — HIGH (ref 70–99)
GLUCOSE SERPL-MCNC: 144 MG/DL — HIGH (ref 70–99)
GLUCOSE SERPL-MCNC: 99 MG/DL — SIGNIFICANT CHANGE UP (ref 70–99)
INR BLD: 1.15 RATIO — SIGNIFICANT CHANGE UP (ref 0.88–1.16)
POTASSIUM SERPL-MCNC: 5.1 MMOL/L — SIGNIFICANT CHANGE UP (ref 3.5–5.3)
POTASSIUM SERPL-MCNC: 7.3 MMOL/L — CRITICAL HIGH (ref 3.5–5.3)
POTASSIUM SERPL-SCNC: 5.1 MMOL/L — SIGNIFICANT CHANGE UP (ref 3.5–5.3)
POTASSIUM SERPL-SCNC: 7.3 MMOL/L — CRITICAL HIGH (ref 3.5–5.3)
PROTHROM AB SERPL-ACNC: 13.3 SEC — SIGNIFICANT CHANGE UP (ref 10.5–13.4)
SODIUM SERPL-SCNC: 134 MMOL/L — LOW (ref 135–145)
SODIUM SERPL-SCNC: 136 MMOL/L — SIGNIFICANT CHANGE UP (ref 135–145)

## 2022-10-29 RX ORDER — WARFARIN SODIUM 2.5 MG/1
7.5 TABLET ORAL AT BEDTIME
Refills: 0 | Status: COMPLETED | OUTPATIENT
Start: 2022-10-29 | End: 2022-10-29

## 2022-10-29 RX ADMIN — Medication 5 MILLIGRAM(S): at 05:10

## 2022-10-29 RX ADMIN — WARFARIN SODIUM 7.5 MILLIGRAM(S): 2.5 TABLET ORAL at 21:02

## 2022-10-29 RX ADMIN — PANTOPRAZOLE SODIUM 40 MILLIGRAM(S): 20 TABLET, DELAYED RELEASE ORAL at 06:36

## 2022-10-29 RX ADMIN — LIDOCAINE 1 PATCH: 4 CREAM TOPICAL at 17:42

## 2022-10-29 RX ADMIN — SIMVASTATIN 10 MILLIGRAM(S): 20 TABLET, FILM COATED ORAL at 21:02

## 2022-10-29 RX ADMIN — Medication 50 MILLIGRAM(S): at 05:10

## 2022-10-29 RX ADMIN — HEPARIN SODIUM 9 UNIT(S)/HR: 5000 INJECTION INTRAVENOUS; SUBCUTANEOUS at 09:39

## 2022-10-29 RX ADMIN — LIDOCAINE 1 PATCH: 4 CREAM TOPICAL at 01:11

## 2022-10-29 RX ADMIN — ISOSORBIDE MONONITRATE 30 MILLIGRAM(S): 60 TABLET, EXTENDED RELEASE ORAL at 12:31

## 2022-10-29 RX ADMIN — Medication 325 MILLIGRAM(S): at 12:31

## 2022-10-29 RX ADMIN — LIDOCAINE 1 PATCH: 4 CREAM TOPICAL at 12:32

## 2022-10-29 RX ADMIN — Medication 2: at 13:54

## 2022-10-29 RX ADMIN — INSULIN GLARGINE 12 UNIT(S): 100 INJECTION, SOLUTION SUBCUTANEOUS at 22:38

## 2022-10-29 RX ADMIN — AMIODARONE HYDROCHLORIDE 200 MILLIGRAM(S): 400 TABLET ORAL at 05:10

## 2022-10-29 NOTE — PROGRESS NOTE ADULT - ASSESSMENT
71 y/o F with pmhx of COPD on home O2(3L) with severe pHTN, MVR/TVR , AF s/p ablation,  OHS/MIGUEL on home biPAP, HTN, HLD who presents with one week of vomiting with poor oral intake also complaining of SOB and chest pain.  She also states she has been taking prednisone on/off for the past 3 months for chest tightness and does endorse some weight gain.     In the ED pt found to have A Flutter and given Amio 400 po and Dig loaded.     #PULM  - Currently on home 3-4 L NC, No exp wheezing   - CXR likely fluid OL  - Continue proair inhalation  - c/w prednisone 5mg since on chronically for 3 months    #CV  Hx of AF w RVR now w AFlutter  - Not on home AC  - obtain TTE, doesnt appear to be in cardiogenic shock but has pulm edema  - Continue home metoprolol and hydralazine  - responded UOP to metolazone & bumex  -   AFLutter  - Amio loaded  - holding digoxin   - EP following  - Continue Metoprolol    MVR/TV replacement off AC  - echo technically difficult, unable to assess due to habitus and positioning  - per chart review under last name Nancy MRN [id number] - mechanical valve replacement 1999, was previously on coumadin but stopped in 09/2021 after worsening anemia, concern for GI bleed & epistaxis.   - continue with heparin GTT  - may require ELIZABETH(?) if needs further assessment    #GI  - Continue DASH CC diet      #RENAL  - Cr uptrending, now 3.01  - baseline ~1.6  - monitor while diuresing  - renal consult if Cr continues to uptrend & UOP declines    #ID  - NO WBC or fevers, no cough or purulent sputum  - monitor off Abbx for now    #ENDO  - A1c 10.7  - FS ~100-130  - Continue home Lantus and premeal, adjust as appropriate    #HEME  - heparin GTT  - monitor PTT and adjust as appropriate      ETHICS/DISPO:  - Full code  - dispo based on labs/UOP/Cr

## 2022-10-29 NOTE — PROGRESS NOTE ADULT - SUBJECTIVE AND OBJECTIVE BOX
SUBJECTIVE / OVERNIGHT EVENTS:pt seen and examined    MEDICATIONS  (STANDING):  aMIOdarone    Tablet 200 milliGRAM(s) Oral daily  aMIOdarone    Tablet   Oral   ferrous    sulfate 325 milliGRAM(s) Oral daily  heparin  Infusion 1800 Unit(s)/Hr (9 mL/Hr) IV Continuous <Continuous>  insulin glargine Injectable (LANTUS) 12 Unit(s) SubCutaneous at bedtime  insulin lispro (ADMELOG) corrective regimen sliding scale   SubCutaneous three times a day before meals  insulin lispro (ADMELOG) corrective regimen sliding scale   SubCutaneous at bedtime  isosorbide   mononitrate ER Tablet (IMDUR) 30 milliGRAM(s) Oral daily  lidocaine   4% Patch 1 Patch Transdermal daily  metoprolol succinate ER 50 milliGRAM(s) Oral daily  pantoprazole    Tablet 40 milliGRAM(s) Oral before breakfast  polyethylene glycol 3350 17 Gram(s) Oral daily  predniSONE   Tablet 5 milliGRAM(s) Oral daily  senna 2 Tablet(s) Oral at bedtime  simvastatin 10 milliGRAM(s) Oral at bedtime    MEDICATIONS  (PRN):  acetaminophen     Tablet .. 650 milliGRAM(s) Oral every 6 hours PRN Temp greater or equal to 38C (100.4F), Mild Pain (1 - 3)  ALBUTerol    90 MICROgram(s) HFA Inhaler 2 Puff(s) Inhalation every 6 hours PRN Bronchospasm  aluminum hydroxide/magnesium hydroxide/simethicone Suspension 30 milliLiter(s) Oral every 4 hours PRN Dyspepsia  melatonin 3 milliGRAM(s) Oral at bedtime PRN Insomnia    Vital Signs Last 24 Hrs  T(C): 36.3 (10-29-22 @ 20:48), Max: 36.4 (10-29-22 @ 12:54)  T(F): 97.4 (10-29-22 @ 20:48), Max: 97.5 (10-29-22 @ 12:54)  HR: 98 (10-29-22 @ 20:48) (81 - 98)  BP: 120/77 (10-29-22 @ 20:48) (118/62 - 144/80)  BP(mean): --  RR: 18 (10-29-22 @ 20:48) (18 - 18)  SpO2: 94% (10-29-22 @ 20:48) (94% - 100%)            Skin: No rash.  Eyes: No recent vision problems or eye pain.  ENT: No congestion, ear pain, or sore throat.  Cardiovascular: No chest pain or palpation.  Respiratory: No cough, shortness of breath, congestion, or wheezing.  Gastrointestinal: No abdominal pain, nausea, vomiting, or diarrhea.  Genitourinary: No dysuria.  Musculoskeletal: No joint swelling.  Neurologic: No headache.    PHYSICAL EXAM:  GENERAL: NAD  EYES: EOMI, PERRLA  NECK: Supple, No JVD  CHEST/LUNG: dec breath sounds at bases  HEART:  S1 , S2 +  ABDOMEN: soft , bs+  EXTREMITIES:  edema+  NEUROLOGY:alert awake oriented    LABS:  10-29    136  |  87<L>  |  102<H>  ----------------------------<  144<H>  5.1   |  43<H>  |  3.01<H>    Ca    10.4      29 Oct 2022 19:50  Mg     2.0     10-28      Creatinine Trend: 3.01 <--, 3.16 <--, 3.46 <--, 3.29 <--, 3.17 <--, 3.02 <--, 2.97 <--, 2.96 <--, 3.01 <--, 2.83 <--, 2.78 <--                        9.5    6.64  )-----------( 272      ( 28 Oct 2022 06:03 )             32.3     Urine Studies:              PT/INR - ( 29 Oct 2022 06:51 )   PT: 13.3 sec;   INR: 1.15 ratio         PTT - ( 29 Oct 2022 06:51 )  PTT:55.9 sec        PT/INR - ( 28 Oct 2022 06:04 )   PT: 13.7 sec;   INR: 1.19 ratio         PTT - ( 28 Oct 2022 21:31 )  PTT:73.0 sec          PT/INR - ( 27 Oct 2022 07:12 )   PT: 13.1 sec;   INR: 1.14 ratio         PTT - ( 26 Oct 2022 06:31 )  PTT:84.2 sec        LIVER FUNCTIONS - ( 25 Oct 2022 04:52 )  Alb: 3.8 g/dL / Pro: 7.8 g/dL / ALK PHOS: 103 U/L / ALT: 19 U/L / AST: 23 U/L / GGT: x           PT/INR - ( 26 Oct 2022 06:31 )   PT: 13.0 sec;   INR: 1.13 ratio         PTT - ( 26 Oct 2022 06:31 )  PTT:84.2 sec    RADIOLOGY & ADDITIONAL TESTS:    Imaging Personally Reviewed:    Consultant(s) Notes Reviewed:      Care Discussed with Consultants/Other Providers:

## 2022-10-30 LAB
ANION GAP SERPL CALC-SCNC: 9 MMOL/L — SIGNIFICANT CHANGE UP (ref 5–17)
APTT BLD: 87.5 SEC — HIGH (ref 27.5–35.5)
APTT BLD: 93.7 SEC — HIGH (ref 27.5–35.5)
BUN SERPL-MCNC: 100 MG/DL — HIGH (ref 7–23)
CALCIUM SERPL-MCNC: 10.5 MG/DL — SIGNIFICANT CHANGE UP (ref 8.4–10.5)
CHLORIDE SERPL-SCNC: 88 MMOL/L — LOW (ref 96–108)
CO2 SERPL-SCNC: 40 MMOL/L — HIGH (ref 22–31)
CREAT SERPL-MCNC: 2.73 MG/DL — HIGH (ref 0.5–1.3)
EGFR: 18 ML/MIN/1.73M2 — LOW
GLUCOSE BLDC GLUCOMTR-MCNC: 147 MG/DL — HIGH (ref 70–99)
GLUCOSE BLDC GLUCOMTR-MCNC: 152 MG/DL — HIGH (ref 70–99)
GLUCOSE BLDC GLUCOMTR-MCNC: 179 MG/DL — HIGH (ref 70–99)
GLUCOSE BLDC GLUCOMTR-MCNC: 85 MG/DL — SIGNIFICANT CHANGE UP (ref 70–99)
GLUCOSE SERPL-MCNC: 85 MG/DL — SIGNIFICANT CHANGE UP (ref 70–99)
HCT VFR BLD CALC: 31 % — LOW (ref 34.5–45)
HGB BLD-MCNC: 9.2 G/DL — LOW (ref 11.5–15.5)
INR BLD: 1.39 RATIO — HIGH (ref 0.88–1.16)
MCHC RBC-ENTMCNC: 29.7 GM/DL — LOW (ref 32–36)
MCHC RBC-ENTMCNC: 29.7 PG — SIGNIFICANT CHANGE UP (ref 27–34)
MCV RBC AUTO: 100 FL — SIGNIFICANT CHANGE UP (ref 80–100)
NRBC # BLD: 0 /100 WBCS — SIGNIFICANT CHANGE UP (ref 0–0)
PLATELET # BLD AUTO: 242 K/UL — SIGNIFICANT CHANGE UP (ref 150–400)
POTASSIUM SERPL-MCNC: 5 MMOL/L — SIGNIFICANT CHANGE UP (ref 3.5–5.3)
POTASSIUM SERPL-SCNC: 5 MMOL/L — SIGNIFICANT CHANGE UP (ref 3.5–5.3)
PROTHROM AB SERPL-ACNC: 16.2 SEC — HIGH (ref 10.5–13.4)
RBC # BLD: 3.1 M/UL — LOW (ref 3.8–5.2)
RBC # FLD: 15.7 % — HIGH (ref 10.3–14.5)
SODIUM SERPL-SCNC: 137 MMOL/L — SIGNIFICANT CHANGE UP (ref 135–145)
WBC # BLD: 5.04 K/UL — SIGNIFICANT CHANGE UP (ref 3.8–10.5)
WBC # FLD AUTO: 5.04 K/UL — SIGNIFICANT CHANGE UP (ref 3.8–10.5)

## 2022-10-30 RX ORDER — ACETAMINOPHEN 500 MG
650 TABLET ORAL ONCE
Refills: 0 | Status: COMPLETED | OUTPATIENT
Start: 2022-10-30 | End: 2022-10-30

## 2022-10-30 RX ORDER — HEPARIN SODIUM 5000 [USP'U]/ML
850 INJECTION INTRAVENOUS; SUBCUTANEOUS
Qty: 25000 | Refills: 0 | Status: DISCONTINUED | OUTPATIENT
Start: 2022-10-30 | End: 2022-10-31

## 2022-10-30 RX ORDER — ACETAMINOPHEN 500 MG
1000 TABLET ORAL ONCE
Refills: 0 | Status: COMPLETED | OUTPATIENT
Start: 2022-10-30 | End: 2022-10-30

## 2022-10-30 RX ORDER — WARFARIN SODIUM 2.5 MG/1
5 TABLET ORAL ONCE
Refills: 0 | Status: COMPLETED | OUTPATIENT
Start: 2022-10-30 | End: 2022-10-30

## 2022-10-30 RX ADMIN — ISOSORBIDE MONONITRATE 30 MILLIGRAM(S): 60 TABLET, EXTENDED RELEASE ORAL at 12:17

## 2022-10-30 RX ADMIN — Medication 1000 MILLIGRAM(S): at 03:20

## 2022-10-30 RX ADMIN — WARFARIN SODIUM 5 MILLIGRAM(S): 2.5 TABLET ORAL at 21:10

## 2022-10-30 RX ADMIN — HEPARIN SODIUM 9 UNIT(S)/HR: 5000 INJECTION INTRAVENOUS; SUBCUTANEOUS at 07:58

## 2022-10-30 RX ADMIN — Medication 5 MILLIGRAM(S): at 06:02

## 2022-10-30 RX ADMIN — LIDOCAINE 1 PATCH: 4 CREAM TOPICAL at 12:17

## 2022-10-30 RX ADMIN — INSULIN GLARGINE 12 UNIT(S): 100 INJECTION, SOLUTION SUBCUTANEOUS at 22:34

## 2022-10-30 RX ADMIN — AMIODARONE HYDROCHLORIDE 200 MILLIGRAM(S): 400 TABLET ORAL at 06:02

## 2022-10-30 RX ADMIN — Medication 325 MILLIGRAM(S): at 12:18

## 2022-10-30 RX ADMIN — Medication 400 MILLIGRAM(S): at 03:03

## 2022-10-30 RX ADMIN — SIMVASTATIN 10 MILLIGRAM(S): 20 TABLET, FILM COATED ORAL at 21:11

## 2022-10-30 RX ADMIN — LIDOCAINE 1 PATCH: 4 CREAM TOPICAL at 20:34

## 2022-10-30 RX ADMIN — Medication 2: at 12:43

## 2022-10-30 RX ADMIN — Medication 50 MILLIGRAM(S): at 06:02

## 2022-10-30 RX ADMIN — PANTOPRAZOLE SODIUM 40 MILLIGRAM(S): 20 TABLET, DELAYED RELEASE ORAL at 06:02

## 2022-10-30 RX ADMIN — HEPARIN SODIUM 8.5 UNIT(S)/HR: 5000 INJECTION INTRAVENOUS; SUBCUTANEOUS at 09:58

## 2022-10-30 RX ADMIN — LIDOCAINE 1 PATCH: 4 CREAM TOPICAL at 02:20

## 2022-10-30 RX ADMIN — Medication 30 MILLILITER(S): at 18:59

## 2022-10-30 NOTE — PROGRESS NOTE ADULT - SUBJECTIVE AND OBJECTIVE BOX
SUBJECTIVE / OVERNIGHT EVENTS:pt seen and examined    MEDICATIONS  (STANDING):  aMIOdarone    Tablet 200 milliGRAM(s) Oral daily  aMIOdarone    Tablet   Oral   ferrous    sulfate 325 milliGRAM(s) Oral daily  heparin  Infusion 850 Unit(s)/Hr (8.5 mL/Hr) IV Continuous <Continuous>  insulin glargine Injectable (LANTUS) 12 Unit(s) SubCutaneous at bedtime  insulin lispro (ADMELOG) corrective regimen sliding scale   SubCutaneous three times a day before meals  insulin lispro (ADMELOG) corrective regimen sliding scale   SubCutaneous at bedtime  isosorbide   mononitrate ER Tablet (IMDUR) 30 milliGRAM(s) Oral daily  lidocaine   4% Patch 1 Patch Transdermal daily  metoprolol succinate ER 50 milliGRAM(s) Oral daily  pantoprazole    Tablet 40 milliGRAM(s) Oral before breakfast  polyethylene glycol 3350 17 Gram(s) Oral daily  predniSONE   Tablet 5 milliGRAM(s) Oral daily  senna 2 Tablet(s) Oral at bedtime  simvastatin 10 milliGRAM(s) Oral at bedtime    MEDICATIONS  (PRN):  acetaminophen     Tablet .. 650 milliGRAM(s) Oral every 6 hours PRN Temp greater or equal to 38C (100.4F), Mild Pain (1 - 3)  ALBUTerol    90 MICROgram(s) HFA Inhaler 2 Puff(s) Inhalation every 6 hours PRN Bronchospasm  aluminum hydroxide/magnesium hydroxide/simethicone Suspension 30 milliLiter(s) Oral every 4 hours PRN Dyspepsia  melatonin 3 milliGRAM(s) Oral at bedtime PRN Insomnia    Vital Signs Last 24 Hrs  T(C): 36.1 (10-30-22 @ 22:33), Max: 36.5 (10-30-22 @ 11:42)  T(F): 97 (10-30-22 @ 22:33), Max: 97.7 (10-30-22 @ 11:42)  HR: 65 (10-30-22 @ 22:33) (64 - 90)  BP: 129/81 (10-30-22 @ 22:33) (109/63 - 129/81)  BP(mean): --  RR: 18 (10-30-22 @ 22:33) (18 - 18)  SpO2: 97% (10-30-22 @ 22:33) (95% - 100%)          Skin: No rash.  Eyes: No recent vision problems or eye pain.  ENT: No congestion, ear pain, or sore throat.  Cardiovascular: No chest pain or palpation.  Respiratory: No cough, shortness of breath, congestion, or wheezing.  Gastrointestinal: No abdominal pain, nausea, vomiting, or diarrhea.  Genitourinary: No dysuria.  Musculoskeletal: No joint swelling.  Neurologic: No headache.    PHYSICAL EXAM:  GENERAL: NAD  EYES: EOMI, PERRLA  NECK: Supple, No JVD  CHEST/LUNG: dec breath sounds at bases  HEART:  S1 , S2 +  ABDOMEN: soft , bs+  EXTREMITIES:  edema+  NEUROLOGY:alert awake oriented    LABS:  10-30    137  |  88<L>  |  100<H>  ----------------------------<  85  5.0   |  40<H>  |  2.73<H>    Ca    10.5      30 Oct 2022 07:23      Creatinine Trend: 2.73 <--, 3.01 <--, 3.16 <--, 3.46 <--, 3.29 <--, 3.17 <--, 3.02 <--, 2.97 <--, 2.96 <--, 3.01 <--                        9.2    5.04  )-----------( 242      ( 30 Oct 2022 16:24 )             31.0     Urine Studies:              PT/INR - ( 30 Oct 2022 07:29 )   PT: 16.2 sec;   INR: 1.39 ratio         PTT - ( 30 Oct 2022 16:24 )  PTT:87.5 sec  RADIOLOGY & ADDITIONAL TESTS:    Imaging Personally Reviewed:yes    Consultant(s) Notes Reviewed:  yes    Care Discussed with Consultants/Other Providers:yes

## 2022-10-30 NOTE — PROGRESS NOTE ADULT - ASSESSMENT
71 y/o F with pmhx of COPD on home O2(3L) with severe pHTN, MVR/TVR , AF s/p ablation,  OHS/MIGUEL on home biPAP, HTN, HLD who presents with one week of vomiting with poor oral intake also complaining of SOB and chest pain.  She also states she has been taking prednisone on/off for the past 3 months for chest tightness and does endorse some weight gain.     In the ED pt found to have A Flutter and given Amio 400 po and Dig loaded.     #PULM  - Currently on home 3-4 L NC, No exp wheezing   - CXR likely fluid OL  - Continue proair inhalation  - c/w prednisone 5mg since on chronically for 3 months    #CV  Hx of AF w RVR now w AFlutter  - Not on home AC  - obtain TTE, doesnt appear to be in cardiogenic shock but has pulm edema  - Continue home metoprolol and hydralazine  - responded UOP to metolazone & bumex  -   AFLutter  - Amio loaded  - holding digoxin   - EP following  - Continue Metoprolol    MVR/TV replacement off AC  - echo technically difficult, unable to assess due to habitus and positioning  - per chart review under last name Nancy MRN [id number] - mechanical valve replacement 1999, was previously on coumadin but stopped in 09/2021 after worsening anemia, concern for GI bleed & epistaxis.   - continue with heparin GTT  - may require ELIZABETH(?) if needs further assessment    #GI  - Continue DASH CC diet      #RENAL  - Cr uptrending, now 3.01  - baseline ~1.6  - monitor while diuresing  - renal consult if Cr continues to uptrend & UOP declines    #ID  - NO WBC or fevers, no cough or purulent sputum  - monitor off Abbx for now    #ENDO  - A1c 10.7  - FS ~100-130  - Continue home Lantus and premeal, adjust as appropriate    #HEME  - heparin GTT  - monitor PTT and adjust as appropriate      ETHICS/DISPO:  - Full code  - dispo based on labs/UOP/Cr

## 2022-10-31 LAB
ANION GAP SERPL CALC-SCNC: 7 MMOL/L — SIGNIFICANT CHANGE UP (ref 5–17)
APTT BLD: 108.4 SEC — HIGH (ref 27.5–35.5)
APTT BLD: 77.5 SEC — HIGH (ref 27.5–35.5)
APTT BLD: 83.1 SEC — HIGH (ref 27.5–35.5)
APTT BLD: 95.3 SEC — HIGH (ref 27.5–35.5)
BUN SERPL-MCNC: 89 MG/DL — HIGH (ref 7–23)
CALCIUM SERPL-MCNC: 10.5 MG/DL — SIGNIFICANT CHANGE UP (ref 8.4–10.5)
CHLORIDE SERPL-SCNC: 90 MMOL/L — LOW (ref 96–108)
CO2 SERPL-SCNC: 40 MMOL/L — HIGH (ref 22–31)
CREAT SERPL-MCNC: 2.46 MG/DL — HIGH (ref 0.5–1.3)
EGFR: 20 ML/MIN/1.73M2 — LOW
GLUCOSE BLDC GLUCOMTR-MCNC: 116 MG/DL — HIGH (ref 70–99)
GLUCOSE BLDC GLUCOMTR-MCNC: 143 MG/DL — HIGH (ref 70–99)
GLUCOSE BLDC GLUCOMTR-MCNC: 190 MG/DL — HIGH (ref 70–99)
GLUCOSE BLDC GLUCOMTR-MCNC: 93 MG/DL — SIGNIFICANT CHANGE UP (ref 70–99)
GLUCOSE SERPL-MCNC: 132 MG/DL — HIGH (ref 70–99)
HCT VFR BLD CALC: 31.2 % — LOW (ref 34.5–45)
HCT VFR BLD CALC: 32.8 % — LOW (ref 34.5–45)
HGB BLD-MCNC: 8.8 G/DL — LOW (ref 11.5–15.5)
HGB BLD-MCNC: 9.5 G/DL — LOW (ref 11.5–15.5)
INR BLD: 1.92 RATIO — HIGH (ref 0.88–1.16)
MCHC RBC-ENTMCNC: 28.2 GM/DL — LOW (ref 32–36)
MCHC RBC-ENTMCNC: 28.9 PG — SIGNIFICANT CHANGE UP (ref 27–34)
MCHC RBC-ENTMCNC: 29 GM/DL — LOW (ref 32–36)
MCHC RBC-ENTMCNC: 29.3 PG — SIGNIFICANT CHANGE UP (ref 27–34)
MCV RBC AUTO: 101.2 FL — HIGH (ref 80–100)
MCV RBC AUTO: 102.3 FL — HIGH (ref 80–100)
NRBC # BLD: 0 /100 WBCS — SIGNIFICANT CHANGE UP (ref 0–0)
NRBC # BLD: 0 /100 WBCS — SIGNIFICANT CHANGE UP (ref 0–0)
PLATELET # BLD AUTO: 240 K/UL — SIGNIFICANT CHANGE UP (ref 150–400)
PLATELET # BLD AUTO: 243 K/UL — SIGNIFICANT CHANGE UP (ref 150–400)
POTASSIUM SERPL-MCNC: 4.7 MMOL/L — SIGNIFICANT CHANGE UP (ref 3.5–5.3)
POTASSIUM SERPL-SCNC: 4.7 MMOL/L — SIGNIFICANT CHANGE UP (ref 3.5–5.3)
PROTHROM AB SERPL-ACNC: 22.2 SEC — HIGH (ref 10.5–13.4)
RBC # BLD: 3.05 M/UL — LOW (ref 3.8–5.2)
RBC # BLD: 3.24 M/UL — LOW (ref 3.8–5.2)
RBC # FLD: 15.5 % — HIGH (ref 10.3–14.5)
RBC # FLD: 15.5 % — HIGH (ref 10.3–14.5)
SODIUM SERPL-SCNC: 137 MMOL/L — SIGNIFICANT CHANGE UP (ref 135–145)
WBC # BLD: 5.59 K/UL — SIGNIFICANT CHANGE UP (ref 3.8–10.5)
WBC # BLD: 6.44 K/UL — SIGNIFICANT CHANGE UP (ref 3.8–10.5)
WBC # FLD AUTO: 5.59 K/UL — SIGNIFICANT CHANGE UP (ref 3.8–10.5)
WBC # FLD AUTO: 6.44 K/UL — SIGNIFICANT CHANGE UP (ref 3.8–10.5)

## 2022-10-31 PROCEDURE — 99232 SBSQ HOSP IP/OBS MODERATE 35: CPT

## 2022-10-31 RX ORDER — WARFARIN SODIUM 2.5 MG/1
3 TABLET ORAL ONCE
Refills: 0 | Status: DISCONTINUED | OUTPATIENT
Start: 2022-10-31 | End: 2022-10-31

## 2022-10-31 RX ORDER — HEPARIN SODIUM 5000 [USP'U]/ML
700 INJECTION INTRAVENOUS; SUBCUTANEOUS
Qty: 25000 | Refills: 0 | Status: DISCONTINUED | OUTPATIENT
Start: 2022-10-31 | End: 2022-11-02

## 2022-10-31 RX ORDER — HEPARIN SODIUM 5000 [USP'U]/ML
800 INJECTION INTRAVENOUS; SUBCUTANEOUS
Qty: 25000 | Refills: 0 | Status: DISCONTINUED | OUTPATIENT
Start: 2022-10-31 | End: 2022-10-31

## 2022-10-31 RX ORDER — WARFARIN SODIUM 2.5 MG/1
4 TABLET ORAL ONCE
Refills: 0 | Status: COMPLETED | OUTPATIENT
Start: 2022-10-31 | End: 2022-10-31

## 2022-10-31 RX ADMIN — LIDOCAINE 1 PATCH: 4 CREAM TOPICAL at 01:00

## 2022-10-31 RX ADMIN — HEPARIN SODIUM 7 UNIT(S)/HR: 5000 INJECTION INTRAVENOUS; SUBCUTANEOUS at 17:18

## 2022-10-31 RX ADMIN — AMIODARONE HYDROCHLORIDE 200 MILLIGRAM(S): 400 TABLET ORAL at 05:07

## 2022-10-31 RX ADMIN — HEPARIN SODIUM 8.5 UNIT(S)/HR: 5000 INJECTION INTRAVENOUS; SUBCUTANEOUS at 00:37

## 2022-10-31 RX ADMIN — LIDOCAINE 1 PATCH: 4 CREAM TOPICAL at 22:08

## 2022-10-31 RX ADMIN — Medication 325 MILLIGRAM(S): at 13:25

## 2022-10-31 RX ADMIN — WARFARIN SODIUM 4 MILLIGRAM(S): 2.5 TABLET ORAL at 22:07

## 2022-10-31 RX ADMIN — Medication 50 MILLIGRAM(S): at 05:07

## 2022-10-31 RX ADMIN — Medication 5 MILLIGRAM(S): at 09:13

## 2022-10-31 RX ADMIN — SIMVASTATIN 10 MILLIGRAM(S): 20 TABLET, FILM COATED ORAL at 22:07

## 2022-10-31 RX ADMIN — HEPARIN SODIUM 8 UNIT(S)/HR: 5000 INJECTION INTRAVENOUS; SUBCUTANEOUS at 13:26

## 2022-10-31 RX ADMIN — INSULIN GLARGINE 12 UNIT(S): 100 INJECTION, SOLUTION SUBCUTANEOUS at 22:07

## 2022-10-31 RX ADMIN — PANTOPRAZOLE SODIUM 40 MILLIGRAM(S): 20 TABLET, DELAYED RELEASE ORAL at 09:12

## 2022-10-31 RX ADMIN — HEPARIN SODIUM 8 UNIT(S)/HR: 5000 INJECTION INTRAVENOUS; SUBCUTANEOUS at 10:20

## 2022-10-31 RX ADMIN — ISOSORBIDE MONONITRATE 30 MILLIGRAM(S): 60 TABLET, EXTENDED RELEASE ORAL at 13:25

## 2022-10-31 NOTE — PROGRESS NOTE ADULT - ASSESSMENT
71 y/o F with pmhx of COPD on home O2(3L) with severe pHTN, MVR/TVR , AF s/p ablation,  OHS/MIGUEL on home biPAP, HTN, HLD who presents with one week of vomiting with poor oral intake also complaining of SOB and chest pain.  She also states she has been taking prednisone on/off for the past 3 months for chest tightness and does endorse some weight gain.     In the ED pt found to have A Flutter and given Amio 400 po and Dig loaded.     #PULM  - Currently on home 3-4 L NC, No exp wheezing   - CXR likely fluid OL  - Continue proair inhalation  - c/w prednisone 5mg since on chronically for 3 months    #CV  Hx of AF w RVR now w AFlutter  - Not on home AC  - obtain TTE, doesnt appear to be in cardiogenic shock but has pulm edema  - Continue home metoprolol and hydralazine  - responded UOP to metolazone & bumex  -   AFLutter  - Amio loaded  - holding digoxin   - EP following  - Continue Metoprolol    MVR/TV replacement off AC  - echo technically difficult, unable to assess due to habitus and positioning  - per chart review under last name Nancy MRN [id number] - mechanical valve replacement 1999, was previously on coumadin but stopped in 09/2021 after worsening anemia, concern for GI bleed & epistaxis.   - continue with heparin GTT  - may require ELIZABETH(?) if needs further assessment    #GI  - Continue DASH CC diet      #RENAL  - Cr uptrending, now 3.01  - baseline ~1.6  - monitor while diuresing  - renal consult if Cr continues to uptrend & UOP declines    #ID  - NO WBC or fevers, no cough or purulent sputum  - monitor off Abbx for now    #ENDO  - A1c 10.7  - FS ~100-130  - Continue home Lantus and premeal, adjust as appropriate    #HEME  - heparin GTT  - monitor PTT and adjust as appropriate      ETHICS/DISPO:  - Full code  - dispo based on labs/UOP/Cr

## 2022-10-31 NOTE — DIETITIAN INITIAL EVALUATION ADULT - ORAL INTAKE PTA/DIET HISTORY
cereal for breakfast, soup for lunch, chicken for dinner. snacks on crackers and [place] cheese. drinks Boost when she is not interested in eating a meal.  prepares meals.

## 2022-10-31 NOTE — DIETITIAN INITIAL EVALUATION ADULT - NS FNS DIET ORDER
Diet, DASH/TLC:   Sodium & Cholesterol Restricted  Consistent Carbohydrate {Evening Snack} (CSTCHOSN) (10-23-22 @ 05:18) [Active]

## 2022-10-31 NOTE — PROGRESS NOTE ADULT - SUBJECTIVE AND OBJECTIVE BOX
SUBJECTIVE / OVERNIGHT EVENTS:pt seen and examined    MEDICATIONS  (STANDING):  aMIOdarone    Tablet 200 milliGRAM(s) Oral daily  aMIOdarone    Tablet   Oral   ferrous    sulfate 325 milliGRAM(s) Oral daily  heparin  Infusion 700 Unit(s)/Hr (7 mL/Hr) IV Continuous <Continuous>  insulin glargine Injectable (LANTUS) 12 Unit(s) SubCutaneous at bedtime  insulin lispro (ADMELOG) corrective regimen sliding scale   SubCutaneous three times a day before meals  insulin lispro (ADMELOG) corrective regimen sliding scale   SubCutaneous at bedtime  isosorbide   mononitrate ER Tablet (IMDUR) 30 milliGRAM(s) Oral daily  lidocaine   4% Patch 1 Patch Transdermal daily  metoprolol succinate ER 50 milliGRAM(s) Oral daily  pantoprazole    Tablet 40 milliGRAM(s) Oral before breakfast  polyethylene glycol 3350 17 Gram(s) Oral daily  predniSONE   Tablet 5 milliGRAM(s) Oral daily  senna 2 Tablet(s) Oral at bedtime  simvastatin 10 milliGRAM(s) Oral at bedtime    MEDICATIONS  (PRN):  acetaminophen     Tablet .. 650 milliGRAM(s) Oral every 6 hours PRN Temp greater or equal to 38C (100.4F), Mild Pain (1 - 3)  ALBUTerol    90 MICROgram(s) HFA Inhaler 2 Puff(s) Inhalation every 6 hours PRN Bronchospasm  aluminum hydroxide/magnesium hydroxide/simethicone Suspension 30 milliLiter(s) Oral every 4 hours PRN Dyspepsia  melatonin 3 milliGRAM(s) Oral at bedtime PRN Insomnia    Vital Signs Last 24 Hrs  T(C): 36.2 (10-31-22 @ 20:45), Max: 36.7 (10-31-22 @ 12:08)  T(F): 97.2 (10-31-22 @ 20:45), Max: 98 (10-31-22 @ 12:08)  HR: 69 (10-31-22 @ 23:22) (64 - 70)  BP: 139/84 (10-31-22 @ 20:45) (100/63 - 139/84)  BP(mean): --  RR: 18 (10-31-22 @ 20:45) (18 - 20)  SpO2: 100% (10-31-22 @ 23:22) (96% - 100%)        Skin: No rash.  Eyes: No recent vision problems or eye pain.  ENT: No congestion, ear pain, or sore throat.  Cardiovascular: No chest pain or palpation.  Respiratory: No cough, shortness of breath, congestion, or wheezing.  Gastrointestinal: No abdominal pain, nausea, vomiting, or diarrhea.  Genitourinary: No dysuria.  Musculoskeletal: No joint swelling.  Neurologic: No headache.    PHYSICAL EXAM:  GENERAL: NAD  EYES: EOMI, PERRLA  NECK: Supple, No JVD  CHEST/LUNG: dec breath sounds at bases  HEART:  S1 , S2 +  ABDOMEN: soft , bs+  EXTREMITIES:  edema+  NEUROLOGY:alert awake oriented    LABS:  10-31    137  |  90<L>  |  89<H>  ----------------------------<  132<H>  4.7   |  40<H>  |  2.46<H>    Ca    10.5      31 Oct 2022 06:49      Creatinine Trend: 2.46 <--, 2.73 <--, 3.01 <--, 3.16 <--, 3.46 <--, 3.29 <--, 3.17 <--, 3.02 <--                        9.5    5.59  )-----------( 243      ( 31 Oct 2022 06:49 )             32.8     Urine Studies:              PT/INR - ( 31 Oct 2022 07:48 )   PT: 22.2 sec;   INR: 1.92 ratio         PTT - ( 31 Oct 2022 15:48 )  PTT:108.4 sec          PT/INR - ( 30 Oct 2022 07:29 )   PT: 16.2 sec;   INR: 1.39 ratio         PTT - ( 30 Oct 2022 16:24 )  PTT:87.5 sec  RADIOLOGY & ADDITIONAL TESTS:    Imaging Personally Reviewed:yes    Consultant(s) Notes Reviewed:  yes    Care Discussed with Consultants/Other Providers:yes

## 2022-10-31 NOTE — DIETITIAN INITIAL EVALUATION ADULT - PERTINENT LABORATORY DATA
10-31    137  |  90<L>  |  89<H>  ----------------------------<  132<H>  4.7   |  40<H>  |  2.46<H>    Ca    10.5      31 Oct 2022 06:49    POCT Blood Glucose.: 116 mg/dL (10-31-22 @ 13:02)  A1C with Estimated Average Glucose Result: 6.6 % (10-25-22 @ 04:54)  A1C with Estimated Average Glucose Result: 10.7 % (08-23-22 @ 23:18)

## 2022-10-31 NOTE — DIETITIAN INITIAL EVALUATION ADULT - PERTINENT MEDS FT
MEDICATIONS  (STANDING):  aMIOdarone    Tablet 200 milliGRAM(s) Oral daily  aMIOdarone    Tablet   Oral   ferrous    sulfate 325 milliGRAM(s) Oral daily  heparin  Infusion 800 Unit(s)/Hr (8 mL/Hr) IV Continuous <Continuous>  insulin glargine Injectable (LANTUS) 12 Unit(s) SubCutaneous at bedtime  insulin lispro (ADMELOG) corrective regimen sliding scale   SubCutaneous three times a day before meals  insulin lispro (ADMELOG) corrective regimen sliding scale   SubCutaneous at bedtime  isosorbide   mononitrate ER Tablet (IMDUR) 30 milliGRAM(s) Oral daily  lidocaine   4% Patch 1 Patch Transdermal daily  metoprolol succinate ER 50 milliGRAM(s) Oral daily  pantoprazole    Tablet 40 milliGRAM(s) Oral before breakfast  polyethylene glycol 3350 17 Gram(s) Oral daily  predniSONE   Tablet 5 milliGRAM(s) Oral daily  senna 2 Tablet(s) Oral at bedtime  simvastatin 10 milliGRAM(s) Oral at bedtime    MEDICATIONS  (PRN):  acetaminophen     Tablet .. 650 milliGRAM(s) Oral every 6 hours PRN Temp greater or equal to 38C (100.4F), Mild Pain (1 - 3)  ALBUTerol    90 MICROgram(s) HFA Inhaler 2 Puff(s) Inhalation every 6 hours PRN Bronchospasm  aluminum hydroxide/magnesium hydroxide/simethicone Suspension 30 milliLiter(s) Oral every 4 hours PRN Dyspepsia  melatonin 3 milliGRAM(s) Oral at bedtime PRN Insomnia

## 2022-10-31 NOTE — PROGRESS NOTE ADULT - SUBJECTIVE AND OBJECTIVE BOX
PULMONARY PROGRESS NOTE    DAMARI GUERRERO  [id number]    Patient is a 72y old  Female who presents with a chief complaint of Atrial flutter     (31 Oct 2022 13:25)      HPI:  -on 3L  states her trilogy was not placed on last night    ROS:   -    ACTIVE MEDICATION LIST:  MEDICATIONS  (STANDING):  aMIOdarone    Tablet 200 milliGRAM(s) Oral daily  aMIOdarone    Tablet   Oral   ferrous    sulfate 325 milliGRAM(s) Oral daily  heparin  Infusion 800 Unit(s)/Hr (8 mL/Hr) IV Continuous <Continuous>  insulin glargine Injectable (LANTUS) 12 Unit(s) SubCutaneous at bedtime  insulin lispro (ADMELOG) corrective regimen sliding scale   SubCutaneous three times a day before meals  insulin lispro (ADMELOG) corrective regimen sliding scale   SubCutaneous at bedtime  isosorbide   mononitrate ER Tablet (IMDUR) 30 milliGRAM(s) Oral daily  lidocaine   4% Patch 1 Patch Transdermal daily  metoprolol succinate ER 50 milliGRAM(s) Oral daily  pantoprazole    Tablet 40 milliGRAM(s) Oral before breakfast  polyethylene glycol 3350 17 Gram(s) Oral daily  predniSONE   Tablet 5 milliGRAM(s) Oral daily  senna 2 Tablet(s) Oral at bedtime  simvastatin 10 milliGRAM(s) Oral at bedtime    MEDICATIONS  (PRN):  acetaminophen     Tablet .. 650 milliGRAM(s) Oral every 6 hours PRN Temp greater or equal to 38C (100.4F), Mild Pain (1 - 3)  ALBUTerol    90 MICROgram(s) HFA Inhaler 2 Puff(s) Inhalation every 6 hours PRN Bronchospasm  aluminum hydroxide/magnesium hydroxide/simethicone Suspension 30 milliLiter(s) Oral every 4 hours PRN Dyspepsia  melatonin 3 milliGRAM(s) Oral at bedtime PRN Insomnia      EXAM:  Vital Signs Last 24 Hrs  T(C): 36.7 (31 Oct 2022 12:08), Max: 36.7 (31 Oct 2022 12:08)  T(F): 98 (31 Oct 2022 12:08), Max: 98 (31 Oct 2022 12:08)  HR: 69 (31 Oct 2022 15:52) (64 - 79)  BP: 120/76 (31 Oct 2022 12:08) (100/63 - 129/81)  BP(mean): --  RR: 20 (31 Oct 2022 15:52) (18 - 20)  SpO2: 96% (31 Oct 2022 15:52) (96% - 100%)    Parameters below as of 31 Oct 2022 15:52  Patient On (Oxygen Delivery Method): nasal cannula  O2 Flow (L/min): 3      GENERAL: The patient is awake and alert in no apparent distress.     LUNGS: Clear to auscultation without wheezing, rales or rhonchi; respirations unlabored                           9.5    5.59  )-----------( 243      ( 31 Oct 2022 06:49 )             32.8       10-31    137  |  90<L>  |  89<H>  ----------------------------<  132<H>  4.7   |  40<H>  |  2.46<H>    Ca    10.5      31 Oct 2022 06:49       < from: Xray Chest 1 View-PORTABLE IMMEDIATE (Xray Chest 1 View-PORTABLE IMMEDIATE .) (10.23.22 @ 00:39) >    ACC: [id number] EXAM:  XR CHEST PORTABLE IMMED 1V                          PROCEDURE DATE:  10/23/2022          INTERPRETATION:  EXAMINATION: XR CHEST IMMEDIATE    CLINICAL INDICATION: Dyspnea    TECHNIQUE: Single frontal, portable view of the chest was obtained.    COMPARISON: Chest x-ray 9/6/2022, abdominal CT 10/22/2022    FINDINGS:  Status post median sternotomy.  The heart size is not well evaluated on this projection.  Redemonstrated bilateral hazy opacities similar to prior exam: Pulmonary  edema/interstitial/alveolar infiltrates.  No pneumothorax.    IMPRESSION:  Pulmonary edema/interstitial/alveolar infiltrates, similar to prior exam.    --- End of Report ---           KAZ DALE MD; Resident Radiologist  This document has been electronically signed.  SILVANA GALLARDO DO; Attending Radiologist  This document has been electronically signed. Oct 23 2022  8:27AM    < end of copied text >    PROBLEM LIST:  72y Female with HEALTH ISSUES - PROBLEM Dx:  Atrial flutter    Pulmonary hypertension    Acute on chronic combined systolic and diastolic congestive heart failure              RECS:  needs to be on trilogy whenever she is sleeping  cardiac optimization   02    Please call with any questions.    Irma Eaton DO  [facility] Pulmonary/Sleep Medicine  [phone]

## 2022-11-01 LAB
ANION GAP SERPL CALC-SCNC: 9 MMOL/L — SIGNIFICANT CHANGE UP (ref 5–17)
APTT BLD: 71.8 SEC — HIGH (ref 27.5–35.5)
BUN SERPL-MCNC: 86 MG/DL — HIGH (ref 7–23)
CALCIUM SERPL-MCNC: 10 MG/DL — SIGNIFICANT CHANGE UP (ref 8.4–10.5)
CHLORIDE SERPL-SCNC: 92 MMOL/L — LOW (ref 96–108)
CO2 SERPL-SCNC: 37 MMOL/L — HIGH (ref 22–31)
CREAT SERPL-MCNC: 2.2 MG/DL — HIGH (ref 0.5–1.3)
EGFR: 23 ML/MIN/1.73M2 — LOW
GLUCOSE BLDC GLUCOMTR-MCNC: 111 MG/DL — HIGH (ref 70–99)
GLUCOSE BLDC GLUCOMTR-MCNC: 136 MG/DL — HIGH (ref 70–99)
GLUCOSE BLDC GLUCOMTR-MCNC: 169 MG/DL — HIGH (ref 70–99)
GLUCOSE BLDC GLUCOMTR-MCNC: 181 MG/DL — HIGH (ref 70–99)
GLUCOSE BLDC GLUCOMTR-MCNC: 89 MG/DL — SIGNIFICANT CHANGE UP (ref 70–99)
GLUCOSE SERPL-MCNC: 118 MG/DL — HIGH (ref 70–99)
HCT VFR BLD CALC: 32.6 % — LOW (ref 34.5–45)
HGB BLD-MCNC: 9.3 G/DL — LOW (ref 11.5–15.5)
INR BLD: 2.35 RATIO — HIGH (ref 0.88–1.16)
MCHC RBC-ENTMCNC: 28.5 GM/DL — LOW (ref 32–36)
MCHC RBC-ENTMCNC: 29.2 PG — SIGNIFICANT CHANGE UP (ref 27–34)
MCV RBC AUTO: 102.5 FL — HIGH (ref 80–100)
NRBC # BLD: 0 /100 WBCS — SIGNIFICANT CHANGE UP (ref 0–0)
PLATELET # BLD AUTO: 232 K/UL — SIGNIFICANT CHANGE UP (ref 150–400)
POTASSIUM SERPL-MCNC: 4.8 MMOL/L — SIGNIFICANT CHANGE UP (ref 3.5–5.3)
POTASSIUM SERPL-SCNC: 4.8 MMOL/L — SIGNIFICANT CHANGE UP (ref 3.5–5.3)
PROTHROM AB SERPL-ACNC: 27.5 SEC — HIGH (ref 10.5–13.4)
RBC # BLD: 3.18 M/UL — LOW (ref 3.8–5.2)
RBC # FLD: 15.4 % — HIGH (ref 10.3–14.5)
SARS-COV-2 RNA SPEC QL NAA+PROBE: SIGNIFICANT CHANGE UP
SODIUM SERPL-SCNC: 138 MMOL/L — SIGNIFICANT CHANGE UP (ref 135–145)
WBC # BLD: 5.79 K/UL — SIGNIFICANT CHANGE UP (ref 3.8–10.5)
WBC # FLD AUTO: 5.79 K/UL — SIGNIFICANT CHANGE UP (ref 3.8–10.5)

## 2022-11-01 PROCEDURE — 99232 SBSQ HOSP IP/OBS MODERATE 35: CPT

## 2022-11-01 RX ORDER — ACETAMINOPHEN 500 MG
1000 TABLET ORAL ONCE
Refills: 0 | Status: COMPLETED | OUTPATIENT
Start: 2022-11-01 | End: 2022-11-01

## 2022-11-01 RX ORDER — WARFARIN SODIUM 2.5 MG/1
5 TABLET ORAL ONCE
Refills: 0 | Status: COMPLETED | OUTPATIENT
Start: 2022-11-01 | End: 2022-11-01

## 2022-11-01 RX ADMIN — LIDOCAINE 1 PATCH: 4 CREAM TOPICAL at 13:40

## 2022-11-01 RX ADMIN — INSULIN GLARGINE 12 UNIT(S): 100 INJECTION, SOLUTION SUBCUTANEOUS at 22:42

## 2022-11-01 RX ADMIN — SIMVASTATIN 10 MILLIGRAM(S): 20 TABLET, FILM COATED ORAL at 21:46

## 2022-11-01 RX ADMIN — Medication 2: at 13:41

## 2022-11-01 RX ADMIN — PANTOPRAZOLE SODIUM 40 MILLIGRAM(S): 20 TABLET, DELAYED RELEASE ORAL at 06:29

## 2022-11-01 RX ADMIN — Medication 650 MILLIGRAM(S): at 21:45

## 2022-11-01 RX ADMIN — Medication 30 MILLILITER(S): at 21:47

## 2022-11-01 RX ADMIN — AMIODARONE HYDROCHLORIDE 200 MILLIGRAM(S): 400 TABLET ORAL at 05:17

## 2022-11-01 RX ADMIN — Medication 5 MILLIGRAM(S): at 10:29

## 2022-11-01 RX ADMIN — HEPARIN SODIUM 7 UNIT(S)/HR: 5000 INJECTION INTRAVENOUS; SUBCUTANEOUS at 00:12

## 2022-11-01 RX ADMIN — Medication 325 MILLIGRAM(S): at 13:39

## 2022-11-01 RX ADMIN — HEPARIN SODIUM 7 UNIT(S)/HR: 5000 INJECTION INTRAVENOUS; SUBCUTANEOUS at 06:36

## 2022-11-01 RX ADMIN — HEPARIN SODIUM 7 UNIT(S)/HR: 5000 INJECTION INTRAVENOUS; SUBCUTANEOUS at 21:18

## 2022-11-01 RX ADMIN — ALBUTEROL 2 PUFF(S): 90 AEROSOL, METERED ORAL at 21:50

## 2022-11-01 RX ADMIN — WARFARIN SODIUM 5 MILLIGRAM(S): 2.5 TABLET ORAL at 22:54

## 2022-11-01 RX ADMIN — Medication 50 MILLIGRAM(S): at 05:18

## 2022-11-01 RX ADMIN — Medication 400 MILLIGRAM(S): at 23:22

## 2022-11-01 RX ADMIN — LIDOCAINE 1 PATCH: 4 CREAM TOPICAL at 07:30

## 2022-11-01 RX ADMIN — SENNA PLUS 2 TABLET(S): 8.6 TABLET ORAL at 21:46

## 2022-11-01 RX ADMIN — ISOSORBIDE MONONITRATE 30 MILLIGRAM(S): 60 TABLET, EXTENDED RELEASE ORAL at 13:40

## 2022-11-01 RX ADMIN — LIDOCAINE 1 PATCH: 4 CREAM TOPICAL at 10:30

## 2022-11-01 NOTE — PROGRESS NOTE ADULT - SUBJECTIVE AND OBJECTIVE BOX
Ms. Alcantara reports overall improvement in her dyspnea since admission.    Physical exam  General – awake, alert, comfortable  CV – distant heart sounds, unable to assess JVP  Lungs – CTAB  Abdomen – S/NT/ND  Extremities – 1+ BRITTA Barclay – A/O x 3, appropriate affect    No tele    TTE 10/23/2022  Technically difficult study  Mild to moderate LVD    Labs:                        9.3    5.79  )-----------( 232      ( 01 Nov 2022 05:34 )             32.6     11-01    138  |  92<L>  |  86<H>  ----------------------------<  118<H>  4.8   |  37<H>  |  2.20<H>    Ca    10.0      01 Nov 2022 05:34      MEDICATIONS  (STANDING):  aMIOdarone    Tablet 200 milliGRAM(s) Oral daily  aMIOdarone    Tablet   Oral   ferrous    sulfate 325 milliGRAM(s) Oral daily  heparin  Infusion 700 Unit(s)/Hr (7 mL/Hr) IV Continuous <Continuous>  insulin glargine Injectable (LANTUS) 12 Unit(s) SubCutaneous at bedtime  insulin lispro (ADMELOG) corrective regimen sliding scale   SubCutaneous three times a day before meals  insulin lispro (ADMELOG) corrective regimen sliding scale   SubCutaneous at bedtime  isosorbide   mononitrate ER Tablet (IMDUR) 30 milliGRAM(s) Oral daily  lidocaine   4% Patch 1 Patch Transdermal daily  metoprolol succinate ER 50 milliGRAM(s) Oral daily  pantoprazole    Tablet 40 milliGRAM(s) Oral before breakfast  polyethylene glycol 3350 17 Gram(s) Oral daily  predniSONE   Tablet 5 milliGRAM(s) Oral daily  senna 2 Tablet(s) Oral at bedtime  simvastatin 10 milliGRAM(s) Oral at bedtime    Impression:  COPD on home O2 (3L)  pHTN  Mechanical 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

## 2022-11-01 NOTE — PROGRESS NOTE ADULT - SUBJECTIVE AND OBJECTIVE BOX
PULMONARY PROGRESS NOTE    DAMARI GUERRERO  MRN-[id number]    Patient is a 72y old  Female who presents with a chief complaint of Vomiting (31 Oct 2022 16:16)      HPI:  -  -on NC  was able to tolerate her trilogy of a few hours last night      ROS:   -    ACTIVE MEDICATION LIST:  MEDICATIONS  (STANDING):  aMIOdarone    Tablet 200 milliGRAM(s) Oral daily  aMIOdarone    Tablet   Oral   ferrous    sulfate 325 milliGRAM(s) Oral daily  heparin  Infusion 700 Unit(s)/Hr (7 mL/Hr) IV Continuous <Continuous>  insulin glargine Injectable (LANTUS) 12 Unit(s) SubCutaneous at bedtime  insulin lispro (ADMELOG) corrective regimen sliding scale   SubCutaneous three times a day before meals  insulin lispro (ADMELOG) corrective regimen sliding scale   SubCutaneous at bedtime  isosorbide   mononitrate ER Tablet (IMDUR) 30 milliGRAM(s) Oral daily  lidocaine   4% Patch 1 Patch Transdermal daily  metoprolol succinate ER 50 milliGRAM(s) Oral daily  pantoprazole    Tablet 40 milliGRAM(s) Oral before breakfast  polyethylene glycol 3350 17 Gram(s) Oral daily  predniSONE   Tablet 5 milliGRAM(s) Oral daily  senna 2 Tablet(s) Oral at bedtime  simvastatin 10 milliGRAM(s) Oral at bedtime    MEDICATIONS  (PRN):  acetaminophen     Tablet .. 650 milliGRAM(s) Oral every 6 hours PRN Temp greater or equal to 38C (100.4F), Mild Pain (1 - 3)  ALBUTerol    90 MICROgram(s) HFA Inhaler 2 Puff(s) Inhalation every 6 hours PRN Bronchospasm  aluminum hydroxide/magnesium hydroxide/simethicone Suspension 30 milliLiter(s) Oral every 4 hours PRN Dyspepsia  melatonin 3 milliGRAM(s) Oral at bedtime PRN Insomnia      EXAM:  Vital Signs Last 24 Hrs  T(C): 36.5 (01 Nov 2022 11:14), Max: 36.6 (01 Nov 2022 04:40)  T(F): 97.7 (01 Nov 2022 11:14), Max: 97.9 (01 Nov 2022 04:40)  HR: 63 (01 Nov 2022 11:14) (63 - 81)  BP: 110/63 (01 Nov 2022 11:14) (109/61 - 139/84)  BP(mean): --  RR: 18 (01 Nov 2022 11:14) (18 - 20)  SpO2: 100% (01 Nov 2022 11:14) (96% - 100%)    Parameters below as of 01 Nov 2022 11:14  Patient On (Oxygen Delivery Method): nasal cannula  O2 Flow (L/min): 3      GENERAL: The patient is awake and alert in no apparent distress.     LUNGS: Clear to auscultation without wheezing, rales or rhonchi; respirations unlabored                             9.3    5.79  )-----------( 232      ( 01 Nov 2022 05:34 )             32.6       11-01    138  |  92<L>  |  86<H>  ----------------------------<  118<H>  4.8   |  37<H>  |  2.20<H>    Ca    10.0      01 Nov 2022 05:34       < from: Xray Chest 1 View-PORTABLE IMMEDIATE (Xray Chest 1 View-PORTABLE IMMEDIATE .) (10.23.22 @ 00:39) >    ACC: [id number] EXAM:  XR CHEST PORTABLE IMMED 1V                          PROCEDURE DATE:  10/23/2022          INTERPRETATION:  EXAMINATION: XR CHEST IMMEDIATE    CLINICAL INDICATION: Dyspnea    TECHNIQUE: Single frontal, portable view of the chest was obtained.    COMPARISON: Chest x-ray 9/6/2022, abdominal CT 10/22/2022    FINDINGS:  Status post median sternotomy.  The heart size is not well evaluated on this projection.  Redemonstrated bilateral hazy opacities similar to prior exam: Pulmonary  edema/interstitial/alveolar infiltrates.  No pneumothorax.    IMPRESSION:  Pulmonary edema/interstitial/alveolar infiltrates, similar to prior exam.    --- End of Report ---           KAZ DALE MD; Resident Radiologist  This document has been electronically signed.  SILVANA GALLARDO DO; Attending Radiologist  This document has been electronically signed. Oct 23 2022  8:27AM    < end of copied text >    PROBLEM LIST:  72y Female with HEALTH ISSUES - PROBLEM Dx:  Atrial flutter    Pulmonary hypertension    Acute on chronic combined systolic and diastolic congestive heart failure              RECS:  trilogy qhs  02  prednisone? needs to see pulm outpatient so we can decide on taper?  will need pfts also given amiodarone use        Please call with any questions.    Irma Eaton DO  [facility] Pulmonary/Sleep Medicine  [phone]

## 2022-11-01 NOTE — PROGRESS NOTE ADULT - SUBJECTIVE AND OBJECTIVE BOX
SUBJECTIVE / OVERNIGHT EVENTS:pt seen and examined    MEDICATIONS  (STANDING):  aMIOdarone    Tablet 200 milliGRAM(s) Oral daily  aMIOdarone    Tablet   Oral   ferrous    sulfate 325 milliGRAM(s) Oral daily  heparin  Infusion 700 Unit(s)/Hr (7 mL/Hr) IV Continuous <Continuous>  insulin glargine Injectable (LANTUS) 12 Unit(s) SubCutaneous at bedtime  insulin lispro (ADMELOG) corrective regimen sliding scale   SubCutaneous three times a day before meals  insulin lispro (ADMELOG) corrective regimen sliding scale   SubCutaneous at bedtime  isosorbide   mononitrate ER Tablet (IMDUR) 30 milliGRAM(s) Oral daily  lidocaine   4% Patch 1 Patch Transdermal daily  metoprolol succinate ER 50 milliGRAM(s) Oral daily  pantoprazole    Tablet 40 milliGRAM(s) Oral before breakfast  polyethylene glycol 3350 17 Gram(s) Oral daily  predniSONE   Tablet 5 milliGRAM(s) Oral daily  senna 2 Tablet(s) Oral at bedtime  simvastatin 10 milliGRAM(s) Oral at bedtime  warfarin 5 milliGRAM(s) Oral once    MEDICATIONS  (PRN):  acetaminophen     Tablet .. 650 milliGRAM(s) Oral every 6 hours PRN Temp greater or equal to 38C (100.4F), Mild Pain (1 - 3)  ALBUTerol    90 MICROgram(s) HFA Inhaler 2 Puff(s) Inhalation every 6 hours PRN Bronchospasm  aluminum hydroxide/magnesium hydroxide/simethicone Suspension 30 milliLiter(s) Oral every 4 hours PRN Dyspepsia  melatonin 3 milliGRAM(s) Oral at bedtime PRN Insomnia    Vital Signs Last 24 Hrs  T(C): 36.4 (11-01-22 @ 21:18), Max: 36.6 (11-01-22 @ 04:40)  T(F): 97.6 (11-01-22 @ 21:18), Max: 97.9 (11-01-22 @ 04:40)  HR: 64 (11-01-22 @ 21:18) (63 - 81)  BP: 118/65 (11-01-22 @ 21:18) (109/61 - 118/65)  BP(mean): --  RR: 18 (11-01-22 @ 21:18) (18 - 18)  SpO2: 100% (11-01-22 @ 21:18) (100% - 100%)        Skin: No rash.  Eyes: No recent vision problems or eye pain.  ENT: No congestion, ear pain, or sore throat.  Cardiovascular: No chest pain or palpation.  Respiratory: No cough, shortness of breath, congestion, or wheezing.  Gastrointestinal: No abdominal pain, nausea, vomiting, or diarrhea.  Genitourinary: No dysuria.  Musculoskeletal: No joint swelling.  Neurologic: No headache.    PHYSICAL EXAM:  GENERAL: NAD  EYES: EOMI, PERRLA  NECK: Supple, No JVD  CHEST/LUNG: dec breath sounds at bases  HEART:  S1 , S2 +  ABDOMEN: soft , bs+  EXTREMITIES:  edema+  NEUROLOGY:alert awake oriented    LABS:  11-01    138  |  92<L>  |  86<H>  ----------------------------<  118<H>  4.8   |  37<H>  |  2.20<H>    Ca    10.0      01 Nov 2022 05:34      Creatinine Trend: 2.20 <--, 2.46 <--, 2.73 <--, 3.01 <--, 3.16 <--, 3.46 <--, 3.29 <--, 3.17 <--                        9.3    5.79  )-----------( 232      ( 01 Nov 2022 05:34 )             32.6     Urine Studies:              PT/INR - ( 01 Nov 2022 05:34 )   PT: 27.5 sec;   INR: 2.35 ratio         PTT - ( 01 Nov 2022 05:34 )  PTT:71.8 sec  RADIOLOGY & ADDITIONAL TESTS:    Imaging Personally Reviewed:yes    Consultant(s) Notes Reviewed:  yes    Care Discussed with Consultants/Other Providers:yes

## 2022-11-01 NOTE — PROGRESS NOTE ADULT - ASSESSMENT
71 y/o F with pmhx of COPD on home O2(3L) with severe pHTN, MVR/TVR , AF s/p ablation,  OHS/MIGUEL on home biPAP, HTN, HLD who presents with one week of vomiting with poor oral intake also complaining of SOB and chest pain.  She also states she has been taking prednisone on/off for the past 3 months for chest tightness and does endorse some weight gain.     In the ED pt found to have A Flutter and given Amio 400 po and Dig loaded.     #PULM  - Currently on home 3-4 L NC, No exp wheezing   - CXR likely fluid OL  - Continue proair inhalation  - c/w prednisone 5mg since on chronically for 3 months    AFLutter  - Amio loaded -EP f/u about dc amio  - holding digoxin   - EP following  - Continue Metoprolol    MVR/TV replacement off AC  - echo technically difficult, unable to assess due to habitus and positioning  - per chart review under last name Nancy MRN [id number] - mechanical valve replacement 1999, was previously on coumadin but stopped in 09/2021 after worsening anemia, concern for GI bleed & epistaxis.   - continue with ac  -? further eval of valve     ENDO  - A1c 10.7  - FS ~100-130  - Continue home Lantus and premeal, adjust as appropriate

## 2022-11-02 LAB
APTT BLD: 85 SEC — HIGH (ref 27.5–35.5)
GLUCOSE BLDC GLUCOMTR-MCNC: 112 MG/DL — HIGH (ref 70–99)
GLUCOSE BLDC GLUCOMTR-MCNC: 119 MG/DL — HIGH (ref 70–99)
GLUCOSE BLDC GLUCOMTR-MCNC: 134 MG/DL — HIGH (ref 70–99)
GLUCOSE BLDC GLUCOMTR-MCNC: 90 MG/DL — SIGNIFICANT CHANGE UP (ref 70–99)
INR BLD: 2.6 RATIO — HIGH (ref 0.88–1.16)
PROTHROM AB SERPL-ACNC: 30.4 SEC — HIGH (ref 10.5–13.4)

## 2022-11-02 PROCEDURE — 99232 SBSQ HOSP IP/OBS MODERATE 35: CPT

## 2022-11-02 PROCEDURE — 93320 DOPPLER ECHO COMPLETE: CPT | Mod: 26

## 2022-11-02 PROCEDURE — 93312 ECHO TRANSESOPHAGEAL: CPT | Mod: 26

## 2022-11-02 PROCEDURE — 93325 DOPPLER ECHO COLOR FLOW MAPG: CPT | Mod: 26

## 2022-11-02 PROCEDURE — 93010 ELECTROCARDIOGRAM REPORT: CPT

## 2022-11-02 RX ORDER — DILTIAZEM HCL 120 MG
120 CAPSULE, EXT RELEASE 24 HR ORAL DAILY
Refills: 0 | Status: DISCONTINUED | OUTPATIENT
Start: 2022-11-02 | End: 2022-11-04

## 2022-11-02 RX ORDER — WARFARIN SODIUM 2.5 MG/1
5 TABLET ORAL ONCE
Refills: 0 | Status: COMPLETED | OUTPATIENT
Start: 2022-11-02 | End: 2022-11-02

## 2022-11-02 RX ADMIN — WARFARIN SODIUM 5 MILLIGRAM(S): 2.5 TABLET ORAL at 22:08

## 2022-11-02 RX ADMIN — Medication 650 MILLIGRAM(S): at 16:01

## 2022-11-02 RX ADMIN — Medication 50 MILLIGRAM(S): at 05:19

## 2022-11-02 RX ADMIN — Medication 5 MILLIGRAM(S): at 09:12

## 2022-11-02 RX ADMIN — Medication 120 MILLIGRAM(S): at 18:23

## 2022-11-02 RX ADMIN — Medication 325 MILLIGRAM(S): at 11:39

## 2022-11-02 RX ADMIN — HEPARIN SODIUM 7 UNIT(S)/HR: 5000 INJECTION INTRAVENOUS; SUBCUTANEOUS at 07:47

## 2022-11-02 RX ADMIN — ISOSORBIDE MONONITRATE 30 MILLIGRAM(S): 60 TABLET, EXTENDED RELEASE ORAL at 11:39

## 2022-11-02 RX ADMIN — Medication 30 MILLILITER(S): at 22:02

## 2022-11-02 RX ADMIN — AMIODARONE HYDROCHLORIDE 200 MILLIGRAM(S): 400 TABLET ORAL at 05:37

## 2022-11-02 RX ADMIN — INSULIN GLARGINE 12 UNIT(S): 100 INJECTION, SOLUTION SUBCUTANEOUS at 22:08

## 2022-11-02 RX ADMIN — LIDOCAINE 1 PATCH: 4 CREAM TOPICAL at 11:40

## 2022-11-02 RX ADMIN — LIDOCAINE 1 PATCH: 4 CREAM TOPICAL at 01:50

## 2022-11-02 RX ADMIN — Medication 650 MILLIGRAM(S): at 16:31

## 2022-11-02 RX ADMIN — SIMVASTATIN 10 MILLIGRAM(S): 20 TABLET, FILM COATED ORAL at 22:04

## 2022-11-02 RX ADMIN — PANTOPRAZOLE SODIUM 40 MILLIGRAM(S): 20 TABLET, DELAYED RELEASE ORAL at 05:38

## 2022-11-02 RX ADMIN — Medication 650 MILLIGRAM(S): at 22:03

## 2022-11-02 RX ADMIN — Medication 650 MILLIGRAM(S): at 22:29

## 2022-11-02 NOTE — CHART NOTE - NSCHARTNOTEFT_GEN_A_CORE
Dx: Persistent atrial arrhythmia     Plan:   -Keep patient NPO after MN except meds for ELIZABETH guided cardioversion tomorrow  -Would d/c amiodarone given risk of chemical conversion and FRAN not cleared  -AAD are limited given renal insufficiency with crt >2  -Recommend to start diltiazem CD 120mg QD in addition to current dose of metoprolol   -Plan discussed with [facility] ACP    DILMA Alonso, NP-C  [phone]

## 2022-11-02 NOTE — PROGRESS NOTE ADULT - SUBJECTIVE AND OBJECTIVE BOX
Tele resumed which showed atrial flutter     Physical exam  General – awake, alert, comfortable, sitting upright  CV – distant heart sounds, unable to assess JVP  Lungs – CTAB  Abdomen – S/NT/ND  Extremities – 1+ BRITTA Barclay – A/O x 3, appropriate affect    Tele:  as above    TTE 10/23/2022  Technically difficult study  Mild to moderate LVD  MEDICATIONS  (STANDING):  aMIOdarone    Tablet 200 milliGRAM(s) Oral daily  aMIOdarone    Tablet   Oral   ferrous    sulfate 325 milliGRAM(s) Oral daily  insulin glargine Injectable (LANTUS) 12 Unit(s) SubCutaneous at bedtime  insulin lispro (ADMELOG) corrective regimen sliding scale   SubCutaneous three times a day before meals  insulin lispro (ADMELOG) corrective regimen sliding scale   SubCutaneous at bedtime  isosorbide   mononitrate ER Tablet (IMDUR) 30 milliGRAM(s) Oral daily  lidocaine   4% Patch 1 Patch Transdermal daily  metoprolol succinate ER 50 milliGRAM(s) Oral daily  pantoprazole    Tablet 40 milliGRAM(s) Oral before breakfast  polyethylene glycol 3350 17 Gram(s) Oral daily  predniSONE   Tablet 5 milliGRAM(s) Oral daily  senna 2 Tablet(s) Oral at bedtime  simvastatin 10 milliGRAM(s) Oral at bedtime    Impression:  COPD on home O2 (3L)  pHTN  Mechanical MV 1999  TVR  AF status/post ablation    Admitted with vomiting, decreased PO intake, dyspnea, and chest pain  Found to be in atrial flutter treated with amio     Recommendations:  Mechanical MV and AF – continue heparin to warfarin    Discuss with EP  possibility of cardioversion    A total of 25 minutes was spent on this patient encounter.

## 2022-11-02 NOTE — PROGRESS NOTE ADULT - SUBJECTIVE AND OBJECTIVE BOX
PULMONARY PROGRESS NOTE    DAMARI GUERRERO  MRN-[id number]    Patient is a 72y old  Female who presents with a chief complaint of Vomiting (02 Nov 2022 13:23)      HPI:  --used her trilogy for about 4-5 hour last night per patient  - on NC now  -     ROS:   -    ACTIVE MEDICATION LIST:  MEDICATIONS  (STANDING):  aMIOdarone    Tablet 200 milliGRAM(s) Oral daily  aMIOdarone    Tablet   Oral   ferrous    sulfate 325 milliGRAM(s) Oral daily  insulin glargine Injectable (LANTUS) 12 Unit(s) SubCutaneous at bedtime  insulin lispro (ADMELOG) corrective regimen sliding scale   SubCutaneous three times a day before meals  insulin lispro (ADMELOG) corrective regimen sliding scale   SubCutaneous at bedtime  isosorbide   mononitrate ER Tablet (IMDUR) 30 milliGRAM(s) Oral daily  lidocaine   4% Patch 1 Patch Transdermal daily  metoprolol succinate ER 50 milliGRAM(s) Oral daily  pantoprazole    Tablet 40 milliGRAM(s) Oral before breakfast  polyethylene glycol 3350 17 Gram(s) Oral daily  predniSONE   Tablet 5 milliGRAM(s) Oral daily  senna 2 Tablet(s) Oral at bedtime  simvastatin 10 milliGRAM(s) Oral at bedtime    MEDICATIONS  (PRN):  acetaminophen     Tablet .. 650 milliGRAM(s) Oral every 6 hours PRN Temp greater or equal to 38C (100.4F), Mild Pain (1 - 3)  ALBUTerol    90 MICROgram(s) HFA Inhaler 2 Puff(s) Inhalation every 6 hours PRN Bronchospasm  aluminum hydroxide/magnesium hydroxide/simethicone Suspension 30 milliLiter(s) Oral every 4 hours PRN Dyspepsia  melatonin 3 milliGRAM(s) Oral at bedtime PRN Insomnia      EXAM:  Vital Signs Last 24 Hrs  T(C): 35.7 (02 Nov 2022 11:10), Max: 36.7 (02 Nov 2022 04:23)  T(F): 96.2 (02 Nov 2022 11:10), Max: 98.1 (02 Nov 2022 04:23)  HR: 93 (02 Nov 2022 11:10) (61 - 93)  BP: 103/76 (02 Nov 2022 11:10) (103/76 - 118/65)  BP(mean): --  RR: 18 (02 Nov 2022 11:10) (18 - 18)  SpO2: 98% (02 Nov 2022 11:10) (95% - 100%)    Parameters below as of 02 Nov 2022 11:10  Patient On (Oxygen Delivery Method): nasal cannula  O2 Flow (L/min): 4      GENERAL: The patient is awake and alert in no apparent distress.     LUNGS: Clear to auscultation without wheezing, rales or rhonchi; respirations unlabored                             9.3    5.79  )-----------( 232      ( 01 Nov 2022 05:34 )             32.6       11-01    138  |  92<L>  |  86<H>  ----------------------------<  118<H>  4.8   |  37<H>  |  2.20<H>    Ca    10.0      01 Nov 2022 05:34       < from: Xray Chest 1 View-PORTABLE IMMEDIATE (Xray Chest 1 View-PORTABLE IMMEDIATE .) (10.23.22 @ 00:39) >    ACC: [id number] EXAM:  XR CHEST PORTABLE IMMED 1V                          PROCEDURE DATE:  10/23/2022          INTERPRETATION:  EXAMINATION: XR CHEST IMMEDIATE    CLINICAL INDICATION: Dyspnea    TECHNIQUE: Single frontal, portable view of the chest was obtained.    COMPARISON: Chest x-ray 9/6/2022, abdominal CT 10/22/2022    FINDINGS:  Status post median sternotomy.  The heart size is not well evaluated on this projection.  Redemonstrated bilateral hazy opacities similar to prior exam: Pulmonary  edema/interstitial/alveolar infiltrates.  No pneumothorax.    IMPRESSION:  Pulmonary edema/interstitial/alveolar infiltrates, similar to prior exam.    --- End of Report ---           KAZ DALE MD; Resident Radiologist  This document has been electronically signed.  SILVANA GALLARDO DO; Attending Radiologist  This document has been electronically signed. Oct 23 2022  8:27AM    < end of copied text >    PROBLEM LIST:  72y Female with HEALTH ISSUES - PROBLEM Dx:  Atrial flutter    Pulmonary hypertension    Acute on chronic combined systolic and diastolic congestive heart failure          RECS:  caridac optimization  AC for her afit/aflutter  Triology fo rhypercarbic resp failure  02  she is on prednisone which probably can be tapered off, but should decide after formal  pfts        Please call with any questions.    Irma Eaton DO  [facility] Pulmonary/Sleep Medicine  [phone]

## 2022-11-02 NOTE — PROGRESS NOTE ADULT - SUBJECTIVE AND OBJECTIVE BOX
Overnight Events: Pt noted to be tachycardic to the 130s, continues to be in Aflutter on telemetry with HRs in the 80s-90s    Review Of Systems: No chest pain, shortness of breath, or palpitations currently but reports dyspnea with positional changes             Current Meds:  acetaminophen     Tablet .. 650 milliGRAM(s) Oral every 6 hours PRN  ALBUTerol    90 MICROgram(s) HFA Inhaler 2 Puff(s) Inhalation every 6 hours PRN  aluminum hydroxide/magnesium hydroxide/simethicone Suspension 30 milliLiter(s) Oral every 4 hours PRN  aMIOdarone    Tablet 200 milliGRAM(s) Oral daily  aMIOdarone    Tablet   Oral   ferrous    sulfate 325 milliGRAM(s) Oral daily  insulin glargine Injectable (LANTUS) 12 Unit(s) SubCutaneous at bedtime  insulin lispro (ADMELOG) corrective regimen sliding scale   SubCutaneous three times a day before meals  insulin lispro (ADMELOG) corrective regimen sliding scale   SubCutaneous at bedtime  isosorbide   mononitrate ER Tablet (IMDUR) 30 milliGRAM(s) Oral daily  lidocaine   4% Patch 1 Patch Transdermal daily  melatonin 3 milliGRAM(s) Oral at bedtime PRN  metoprolol succinate ER 50 milliGRAM(s) Oral daily  pantoprazole    Tablet 40 milliGRAM(s) Oral before breakfast  polyethylene glycol 3350 17 Gram(s) Oral daily  predniSONE   Tablet 5 milliGRAM(s) Oral daily  senna 2 Tablet(s) Oral at bedtime  simvastatin 10 milliGRAM(s) Oral at bedtime      Vitals:  T(F): 96.2 (11-02), Max: 98.1 (11-02)  HR: 93 (11-02) (61 - 93)  BP: 103/76 (11-02) (103/76 - 118/65)  RR: 18 (11-02)  SpO2: 98% (11-02)  I&O's Summary    01 Nov 2022 07:01  -  02 Nov 2022 07:00  --------------------------------------------------------  IN: 63 mL / OUT: 650 mL / NET: -587 mL    02 Nov 2022 07:01  -  02 Nov 2022 13:23  --------------------------------------------------------  IN: 180 mL / OUT: 0 mL / NET: 180 mL        Physical Exam:    Appearance: NAD; obese  Eyes: pink conjunctiva  HEENT: Normal oral mucosa  Cardiovascular: RRR, normal S1, S2, no murmurs, rubs, or gallops; no edema; JVD not appreciable   Respiratory: Clear to anterior auscultation  Gastrointestinal: soft, non-tender  Musculoskeletal: No clubbing; no joint deformity   Neurologic: Normal speech, no facial asymmetry  Psychiatry: AAOx3, mood & affect appropriate  Skin: No rashes, ecchymoses, or cyanosis of exposed skin                             9.3    5.79  )-----------( 232      ( 01 Nov 2022 05:34 )             32.6     11-01    138  |  92<L>  |  86<H>  ----------------------------<  118<H>  4.8   |  37<H>  |  2.20<H>    Ca    10.0      01 Nov 2022 05:34      PT/INR - ( 02 Nov 2022 07:16 )   PT: 30.4 sec;   INR: 2.60 ratio         PTT - ( 02 Nov 2022 07:16 )  PTT:85.0 sec

## 2022-11-02 NOTE — PROGRESS NOTE ADULT - SUBJECTIVE AND OBJECTIVE BOX
SUBJECTIVE / OVERNIGHT EVENTS:pt seen and examined    MEDICATIONS  (STANDING):  diltiazem    milliGRAM(s) Oral daily  ferrous    sulfate 325 milliGRAM(s) Oral daily  insulin glargine Injectable (LANTUS) 12 Unit(s) SubCutaneous at bedtime  insulin lispro (ADMELOG) corrective regimen sliding scale   SubCutaneous three times a day before meals  insulin lispro (ADMELOG) corrective regimen sliding scale   SubCutaneous at bedtime  isosorbide   mononitrate ER Tablet (IMDUR) 30 milliGRAM(s) Oral daily  lidocaine   4% Patch 1 Patch Transdermal daily  metoprolol succinate ER 50 milliGRAM(s) Oral daily  pantoprazole    Tablet 40 milliGRAM(s) Oral before breakfast  polyethylene glycol 3350 17 Gram(s) Oral daily  predniSONE   Tablet 5 milliGRAM(s) Oral daily  senna 2 Tablet(s) Oral at bedtime  simvastatin 10 milliGRAM(s) Oral at bedtime    MEDICATIONS  (PRN):  acetaminophen     Tablet .. 650 milliGRAM(s) Oral every 6 hours PRN Temp greater or equal to 38C (100.4F), Mild Pain (1 - 3)  ALBUTerol    90 MICROgram(s) HFA Inhaler 2 Puff(s) Inhalation every 6 hours PRN Bronchospasm  aluminum hydroxide/magnesium hydroxide/simethicone Suspension 30 milliLiter(s) Oral every 4 hours PRN Dyspepsia  melatonin 3 milliGRAM(s) Oral at bedtime PRN Insomnia    Vital Signs Last 24 Hrs  T(C): 36.3 (11-02-22 @ 20:34), Max: 36.7 (11-02-22 @ 04:23)  T(F): 97.4 (11-02-22 @ 20:34), Max: 98.1 (11-02-22 @ 04:23)  HR: 64 (11-02-22 @ 20:34) (61 - 93)  BP: 110/70 (11-02-22 @ 20:34) (103/76 - 117/74)  BP(mean): --  RR: 18 (11-02-22 @ 20:34) (18 - 18)  SpO2: 97% (11-02-22 @ 20:34) (95% - 98%)            Skin: No rash.  Eyes: No recent vision problems or eye pain.  ENT: No congestion, ear pain, or sore throat.  Cardiovascular: No chest pain or palpation.  Respiratory: No cough, shortness of breath, congestion, or wheezing.  Gastrointestinal: No abdominal pain, nausea, vomiting, or diarrhea.  Genitourinary: No dysuria.  Musculoskeletal: No joint swelling.  Neurologic: No headache.    PHYSICAL EXAM:  GENERAL: NAD  EYES: EOMI, PERRLA  NECK: Supple, No JVD  CHEST/LUNG: dec breath sounds at bases  HEART:  S1 , S2 +  ABDOMEN: soft , bs+  EXTREMITIES:  edema+  NEUROLOGY:alert awake oriented    LABS:  11-01    138  |  92<L>  |  86<H>  ----------------------------<  118<H>  4.8   |  37<H>  |  2.20<H>    Ca    10.0      01 Nov 2022 05:34      Creatinine Trend: 2.20 <--, 2.46 <--, 2.73 <--, 3.01 <--, 3.16 <--, 3.46 <--, 3.29 <--                        9.3    5.79  )-----------( 232      ( 01 Nov 2022 05:34 )             32.6     Urine Studies:              PT/INR - ( 02 Nov 2022 07:16 )   PT: 30.4 sec;   INR: 2.60 ratio         PTT - ( 02 Nov 2022 07:16 )  PTT:85.0 sec

## 2022-11-02 NOTE — PROGRESS NOTE ADULT - ASSESSMENT
The patient is a 73yo female with a PMH of COPD on home O2(3L) with severe pHTN, MVR/TVR , AF s/p ablation,  OHS/MIGUEL on home biPAP, HTN, HLD who initially presented to the ED as a transfer from [facility] [place] for Aflutter s/p Amiodarone initiation. EP has been recalled due to an episode of Aflutter in the 130s and due to concern for continued Amiodarone use given history of COPD and long term medication toxicity.     Recommendations   - Pt had been off of telemetry until current episode  - Now in Aflutter on telemetry with HRs in the 80s-90s  - Hemodynamically stable currently, reports some positional dyspnea  - Currently on Amiodarone 200mg and Metoprolol 50mg daily   - Continue Warfarin  - Further recommendations to follow       Belle Driscoll MD  Cardiology Fellow     Recommendations are preliminary until cosigned by attending    The patient is a 73yo female with a PMH of COPD on home O2(3L) with severe pHTN, MVR/TVR , AF s/p ablation,  OHS/MIGUEL on home biPAP, HTN, HLD who initially presented to the ED as a transfer from [facility] for Aflutter s/p Amiodarone initiation. EP has been recalled due to an episode of Aflutter in the 130s and due to concern for continued Amiodarone use given history of COPD and long term medication toxicity.     Recommendations   - Pt had been off of telemetry until current episode  - Now in Aflutter on telemetry with HRs in the 80s-90s  - Hemodynamically stable currently, reports some positional dyspnea  - Currently on Amiodarone 200mg and Metoprolol 50mg daily   - Continue Warfarin  - Patient not a good candidate for ablation at this time given her respiratory status and inability to tolerate the procedure supine   - Okay to discontinue Amiodarone and begin Diltiazem 120mg , with uptitration as tolerated       Belle Driscoll MD  Cardiology Fellow     Recommendations are preliminary until cosigned by attending

## 2022-11-02 NOTE — PROGRESS NOTE ADULT - ASSESSMENT
71 y/o F with pmhx of COPD on home O2(3L) with severe pHTN, MVR/TVR , AF s/p ablation,  OHS/MIGUEL on home biPAP, HTN, HLD who presents with one week of vomiting with poor oral intake also complaining of SOB and chest pain.  She also states she has been taking prednisone on/off for the past 3 months for chest tightness and does endorse some weight gain.     In the ED pt found to have A Flutter and given Amio 400 po and Dig loaded.     #PULM  - Currently on home 3-4 L NC, No exp wheezing   - CXR likely fluid OL  - Continue proair inhalation  - c/w prednisone 5mg since on chronically for 3 months    AFLutter  - Amio loaded -EP f/u about dc amio  - holding digoxin   - EP following  - Continue Metoprolol    MVR/TV replacement off AC  - echo technically difficult, unable to assess due to habitus and positioning  - per chart review under last name Nancy MRN [id number] - mechanical valve replacement 1999, was previously on coumadin but stopped in 09/2021 after worsening anemia, concern for GI bleed & epistaxis.   - continue with ac  -? further eval of valve     ENDO  - A1c 10.7  - FS ~100-130  - Continue home Lantus and premeal, adjust as appropriate

## 2022-11-03 LAB
ANION GAP SERPL CALC-SCNC: 11 MMOL/L — SIGNIFICANT CHANGE UP (ref 5–17)
ANION GAP SERPL CALC-SCNC: 6 MMOL/L — SIGNIFICANT CHANGE UP (ref 5–17)
BUN SERPL-MCNC: 77 MG/DL — HIGH (ref 7–23)
BUN SERPL-MCNC: 81 MG/DL — HIGH (ref 7–23)
CALCIUM SERPL-MCNC: 10.4 MG/DL — SIGNIFICANT CHANGE UP (ref 8.4–10.5)
CALCIUM SERPL-MCNC: 10.6 MG/DL — HIGH (ref 8.4–10.5)
CHLORIDE SERPL-SCNC: 93 MMOL/L — LOW (ref 96–108)
CHLORIDE SERPL-SCNC: 95 MMOL/L — LOW (ref 96–108)
CO2 SERPL-SCNC: 33 MMOL/L — HIGH (ref 22–31)
CO2 SERPL-SCNC: 38 MMOL/L — HIGH (ref 22–31)
CREAT SERPL-MCNC: 2.31 MG/DL — HIGH (ref 0.5–1.3)
CREAT SERPL-MCNC: 2.36 MG/DL — HIGH (ref 0.5–1.3)
EGFR: 21 ML/MIN/1.73M2 — LOW
EGFR: 22 ML/MIN/1.73M2 — LOW
GLUCOSE BLDC GLUCOMTR-MCNC: 105 MG/DL — HIGH (ref 70–99)
GLUCOSE BLDC GLUCOMTR-MCNC: 121 MG/DL — HIGH (ref 70–99)
GLUCOSE BLDC GLUCOMTR-MCNC: 146 MG/DL — HIGH (ref 70–99)
GLUCOSE BLDC GLUCOMTR-MCNC: 158 MG/DL — HIGH (ref 70–99)
GLUCOSE BLDC GLUCOMTR-MCNC: 99 MG/DL — SIGNIFICANT CHANGE UP (ref 70–99)
GLUCOSE SERPL-MCNC: 129 MG/DL — HIGH (ref 70–99)
GLUCOSE SERPL-MCNC: 82 MG/DL — SIGNIFICANT CHANGE UP (ref 70–99)
INR BLD: 2.53 RATIO — HIGH (ref 0.88–1.16)
MAGNESIUM SERPL-MCNC: 2.6 MG/DL — SIGNIFICANT CHANGE UP (ref 1.6–2.6)
POTASSIUM SERPL-MCNC: 4.9 MMOL/L — SIGNIFICANT CHANGE UP (ref 3.5–5.3)
POTASSIUM SERPL-MCNC: 5.8 MMOL/L — HIGH (ref 3.5–5.3)
POTASSIUM SERPL-SCNC: 4.9 MMOL/L — SIGNIFICANT CHANGE UP (ref 3.5–5.3)
POTASSIUM SERPL-SCNC: 5.8 MMOL/L — HIGH (ref 3.5–5.3)
PROTHROM AB SERPL-ACNC: 29.6 SEC — HIGH (ref 10.5–13.4)
SODIUM SERPL-SCNC: 137 MMOL/L — SIGNIFICANT CHANGE UP (ref 135–145)
SODIUM SERPL-SCNC: 139 MMOL/L — SIGNIFICANT CHANGE UP (ref 135–145)

## 2022-11-03 PROCEDURE — 99232 SBSQ HOSP IP/OBS MODERATE 35: CPT

## 2022-11-03 PROCEDURE — 93010 ELECTROCARDIOGRAM REPORT: CPT

## 2022-11-03 RX ORDER — BUMETANIDE 0.25 MG/ML
1 INJECTION INTRAMUSCULAR; INTRAVENOUS ONCE
Refills: 0 | Status: COMPLETED | OUTPATIENT
Start: 2022-11-03 | End: 2022-11-03

## 2022-11-03 RX ORDER — SODIUM ZIRCONIUM CYCLOSILICATE 10 G/10G
10 POWDER, FOR SUSPENSION ORAL ONCE
Refills: 0 | Status: COMPLETED | OUTPATIENT
Start: 2022-11-03 | End: 2022-11-03

## 2022-11-03 RX ORDER — WARFARIN SODIUM 2.5 MG/1
5 TABLET ORAL ONCE
Refills: 0 | Status: COMPLETED | OUTPATIENT
Start: 2022-11-03 | End: 2022-11-03

## 2022-11-03 RX ADMIN — INSULIN GLARGINE 12 UNIT(S): 100 INJECTION, SOLUTION SUBCUTANEOUS at 22:34

## 2022-11-03 RX ADMIN — SODIUM ZIRCONIUM CYCLOSILICATE 10 GRAM(S): 10 POWDER, FOR SUSPENSION ORAL at 13:46

## 2022-11-03 RX ADMIN — SIMVASTATIN 10 MILLIGRAM(S): 20 TABLET, FILM COATED ORAL at 22:34

## 2022-11-03 RX ADMIN — PANTOPRAZOLE SODIUM 40 MILLIGRAM(S): 20 TABLET, DELAYED RELEASE ORAL at 05:09

## 2022-11-03 RX ADMIN — WARFARIN SODIUM 5 MILLIGRAM(S): 2.5 TABLET ORAL at 22:33

## 2022-11-03 RX ADMIN — POLYETHYLENE GLYCOL 3350 17 GRAM(S): 17 POWDER, FOR SOLUTION ORAL at 22:33

## 2022-11-03 RX ADMIN — BUMETANIDE 1 MILLIGRAM(S): 0.25 INJECTION INTRAMUSCULAR; INTRAVENOUS at 17:29

## 2022-11-03 RX ADMIN — SENNA PLUS 2 TABLET(S): 8.6 TABLET ORAL at 22:33

## 2022-11-03 RX ADMIN — Medication 120 MILLIGRAM(S): at 05:11

## 2022-11-03 RX ADMIN — Medication 650 MILLIGRAM(S): at 14:00

## 2022-11-03 RX ADMIN — ISOSORBIDE MONONITRATE 30 MILLIGRAM(S): 60 TABLET, EXTENDED RELEASE ORAL at 13:47

## 2022-11-03 RX ADMIN — Medication 5 MILLIGRAM(S): at 17:25

## 2022-11-03 RX ADMIN — Medication 325 MILLIGRAM(S): at 13:45

## 2022-11-03 RX ADMIN — LIDOCAINE 1 PATCH: 4 CREAM TOPICAL at 13:47

## 2022-11-03 RX ADMIN — Medication 50 MILLIGRAM(S): at 05:08

## 2022-11-03 RX ADMIN — Medication 650 MILLIGRAM(S): at 13:48

## 2022-11-03 NOTE — PROGRESS NOTE ADULT - SUBJECTIVE AND OBJECTIVE BOX
[facility] NEPHROLOGY- CONSULTATION NOTE    72 year old Female with history of below presents with SOB found to be in aflutter. Nephrology consulted for elevated Scr.    Patient known to our practice for h/o CKD-3b on prior admissions with baseline Scr 1.4-1.6. Patient had been on bumex 1 mg PO twice daily as an outpatient. Patient initially admitted to CICU s/p amiodarone and bumex gtt for volume overload which was discontinued due to MISAEL.     Patient had been planned for cardioversion which was cancelled today due to hyperkalemia.    REVIEW OF SYSTEMS:  Gen: no fevers  HEENT: no rhinorrhea  Neck: no sore throat  Cards: no chest pain  Resp: + dyspnea with exertion (not at baseline)  GI: no nausea or vomiting or diarrhea  : no dysuria or hematuria  Vascular: + LE edema  Derm: no rashes  Neuro: no numbness/tingling    No Known Allergies      Home Medications Reviewed  Hospital Medications:   MEDICATIONS  (STANDING):  acetaminophen     Tablet .. 975 milliGRAM(s) Oral once  budesonide 160 MICROgram(s)/formoterol 4.5 MICROgram(s) Inhaler 2 Puff(s) Inhalation two times a day  ferrous    sulfate 325 milliGRAM(s) Oral daily  heparin   Injectable 5000 Unit(s) SubCutaneous every 8 hours  hydrALAZINE 25 milliGRAM(s) Oral every 8 hours  lidocaine   4% Patch 1 Patch Transdermal every 24 hours  metoprolol succinate ER 25 milliGRAM(s) Oral daily  pantoprazole    Tablet 40 milliGRAM(s) Oral before breakfast  simvastatin 10 milliGRAM(s) Oral at bedtime      PAST MEDICAL & SURGICAL HISTORY:  Atrial fibrillation  S/P Ablation    Pulmonary hypertension    MIGUEL (obstructive sleep apnea)    COPD (chronic obstructive pulmonary disease)  on home O2    CHF (congestive heart failure)    HTN (hypertension)    Gout    Mitral valve replaced    Tricuspid valve replaced    CHF (congestive heart failure)    Hypertension    COPD (chronic obstructive pulmonary disease)    History of mitral valve replacement with mechanical valve    Tricuspid valve replaced  mechanical    No significant past surgical history        FAMILY HISTORY:  Family history of hypertension (Father, Sibling)    Family history of stroke    Family history of hypertension (Mother)        SOCIAL HISTORY:  Denies toxic substance use       VITALS:  T(F): 97.2 (11-03-22 @ 11:58), Max: 97.6 (11-03-22 @ 04:40)  HR: 65 (11-03-22 @ 11:58)  BP: 127/81 (11-03-22 @ 11:58)  RR: 19 (11-03-22 @ 11:58)  SpO2: 99% (11-03-22 @ 11:58)  Wt(kg): --    11-02 @ 07:01  -  11-03 @ 07:00  --------------------------------------------------------  IN: 930 mL / OUT: 0 mL / NET: 930 mL      PHYSICAL EXAM:  Gen: NAD, calm  HEENT: MMM  Neck: no JVD  Cards: Irregularly irregular, +S1/S2, no M/G/R  Resp: Bibasilar rales  GI: soft, NT/ND, NABS  : no CVA tenderness  Vascular: 1+ LE edema B/L  Derm: no rashes  Neuro: non-focal      LABS:  11-03    137  |  93<L>  |  81<H>  ----------------------------<  82  5.8<H>   |  33<H>  |  2.31<H>    Ca    10.6<H>      03 Nov 2022 07:01  Mg     2.6     11-03      Creatinine Trend: 2.31 <--, 2.20 <--, 2.46 <--, 2.73 <--, 3.01 <--, 3.16 <--, 3.46 <--    Urine Studies:        < from: Xray Chest 1 View-PORTABLE IMMEDIATE (Xray Chest 1 View-PORTABLE IMMEDIATE .) (10.23.22 @ 00:39) >  IMPRESSION:  Pulmonary edema/interstitial/alveolar infiltrates, similar to prior exam.    --- End of Report ---    < end of copied text >      < from: CT Abdomen and Pelvis No Cont (10.22.22 @ 22:38) >  KIDNEYS/URETERS: Within normal limits.    BLADDER: Minimally distended.    < end of copied text >

## 2022-11-03 NOTE — PROGRESS NOTE ADULT - SUBJECTIVE AND OBJECTIVE BOX
Ms. Alcantara reports no significant complaints.    Physical exam  General – awake, alert, comfortable, sitting upright  CV – distant heart sounds, unable to assess JVP  Lungs – CTAB  Abdomen – S/NT/ND  Extremities – 1+ BRITTA Barclay – A/O x 3, appropriate affect    Tele:   Atrial fibrillation 50-60 bpm    TTE 10/23/2022  Technically difficult study  Mild to moderate LVD    Labs:  11-03    137  |  93<L>  |  81<H>  ----------------------------<  82  5.8<H>   |  33<H>  |  2.31<H>    Ca    10.6<H>      03 Nov 2022 07:01  Mg     2.6     11-03    MEDICATIONS  (STANDING):  buMETAnide Injectable 1 milliGRAM(s) IV Push once  diltiazem    milliGRAM(s) Oral daily  ferrous    sulfate 325 milliGRAM(s) Oral daily  insulin glargine Injectable (LANTUS) 12 Unit(s) SubCutaneous at bedtime  insulin lispro (ADMELOG) corrective regimen sliding scale   SubCutaneous three times a day before meals  insulin lispro (ADMELOG) corrective regimen sliding scale   SubCutaneous at bedtime  isosorbide   mononitrate ER Tablet (IMDUR) 30 milliGRAM(s) Oral daily  lidocaine   4% Patch 1 Patch Transdermal daily  metoprolol succinate ER 50 milliGRAM(s) Oral daily  pantoprazole    Tablet 40 milliGRAM(s) Oral before breakfast  polyethylene glycol 3350 17 Gram(s) Oral daily  predniSONE   Tablet 5 milliGRAM(s) Oral daily  senna 2 Tablet(s) Oral at bedtime  simvastatin 10 milliGRAM(s) Oral at bedtime  warfarin 5 milliGRAM(s) Oral once    Impression:  COPD on home O2 (3L)  pHTN  Mechanical MV 1999  TVR  AF status/post ablation    Admitted with vomiting, decreased PO intake, dyspnea, and chest pain  Found to be in atrial flutter treated with amio     Recommendations:  Mechanical MV and AF – continue heparin to warfarin    Discuss with EP  possibility of cardioversion - plan for tomorrow after GI eval and resolution of hyperkalemia    A total of 25 minutes was spent on this patient encounter.   Ms. Alcantara reports no significant complaints.    Physical exam  General – awake, alert, comfortable, sitting upright  CV – distant heart sounds, unable to assess JVP  Lungs – CTAB  Abdomen – S/NT/ND  Extremities – 1+ BRITTA Barclay – A/O x 3, appropriate affect    Tele:   Atrial fibrillation 50-60 bpm    TTE 10/23/2022  Technically difficult study  Mild to moderate LVD    Labs:  11-03    137  |  93<L>  |  81<H>  ----------------------------<  82  5.8<H>   |  33<H>  |  2.31<H>    Ca    10.6<H>      03 Nov 2022 07:01  Mg     2.6     11-03    MEDICATIONS  (STANDING):  buMETAnide Injectable 1 milliGRAM(s) IV Push once  diltiazem    milliGRAM(s) Oral daily  ferrous    sulfate 325 milliGRAM(s) Oral daily  insulin glargine Injectable (LANTUS) 12 Unit(s) SubCutaneous at bedtime  insulin lispro (ADMELOG) corrective regimen sliding scale   SubCutaneous three times a day before meals  insulin lispro (ADMELOG) corrective regimen sliding scale   SubCutaneous at bedtime  isosorbide   mononitrate ER Tablet (IMDUR) 30 milliGRAM(s) Oral daily  lidocaine   4% Patch 1 Patch Transdermal daily  metoprolol succinate ER 50 milliGRAM(s) Oral daily  pantoprazole    Tablet 40 milliGRAM(s) Oral before breakfast  polyethylene glycol 3350 17 Gram(s) Oral daily  predniSONE   Tablet 5 milliGRAM(s) Oral daily  senna 2 Tablet(s) Oral at bedtime  simvastatin 10 milliGRAM(s) Oral at bedtime  warfarin 5 milliGRAM(s) Oral once    Impression:  COPD on home O2 (3L)  pHTN  Mechanical MV 1999  TVR  AF status/post ablation    Admitted with vomiting, decreased PO intake, dyspnea, and chest pain  Found to be in atrial flutter treated with amio     Recommendations:  Mechanical MV and AF –  warfarin    Discuss with EP  possibility of cardioversion - plan for tomorrow after GI eval and resolution of hyperkalemia    A total of 25 minutes was spent on this patient encounter.

## 2022-11-03 NOTE — PROGRESS NOTE ADULT - ASSESSMENT
72 year old Female with history of COPD, CHF presents with SOB. Nephrology consulted for elevated Scr.    1) MISAEL: likely hemodynamically mediated in setting of aflutter and HF. Scr improving but remains above baseline. Check UA, urine urea and urine creatinine. Given distended bladder on CT, check bladder scan. Avoid nephrotoxins.    2) CKD-3b: Baseline Scr 1.4-1.6 with CKD likely due to DM. Outpatient CKD work up. Monitor electrolytes.    3) HTN with CKD: BP controlled. Rate control as per cardiology/EP. Monitor BP.    4) LE edema: Patient remains volume overloaded. Can restart bumex 1 mg PO daily. TTE with mild to moderate LVSD. Monitor UO.    5) Metabolic alkalosis: Check blood gas to rule out compensated respiratory acidosis. Monitor pH.    6) Hyperkalemia: Due to high potassium diet (patient admits to eating bananas). Will give lokelma 10 grams PO X 1 dose. Change diet to low K. Repeat serum potassium this afternoon. Monitor serum potassium.    7) Hypercalcemia: Check iPTH. Further work up pending results. Monitor serum calcium.    8) Anemia of renal disease: Hb low. Check AM iron stores. Continue with ferrous sulfate. Will consider CHACORTA pending results. Monitor Hb.    9) L renal mass: Not visualized on CT. Outpatient Urology evaluation.      [facility] NEPHROLOGY  Rodrigue Amador M.D.  Rob Perez D.O.  Ana Song M.D.  Lucrecia López, MSN, ANP-C    Telephone: [phone]  Facsimile: [phone]    [address] [place] [id number]       72 year old Female with history of COPD, CHF presents with SOB. Nephrology consulted for elevated Scr.    1) MISAEL: likely hemodynamically mediated in setting of aflutter and HF. Scr improving but remains above baseline. Check UA, urine urea and urine creatinine. Given distended bladder on CT, check bladder scan. Avoid nephrotoxins.    2) CKD-3b: Baseline Scr 1.4-1.6 with CKD likely due to DM. Outpatient CKD work up. Monitor electrolytes.    3) HTN with CKD: BP controlled. Rate control as per cardiology/EP. Monitor BP.    4) LE edema: Patient remains volume overloaded. Will give bumex 1 mg IV X 1 dose today. TTE with mild to moderate LVSD. Monitor UO.    5) Metabolic alkalosis: Check blood gas to rule out compensated respiratory acidosis. Monitor pH.    6) Hyperkalemia: Due to high potassium diet (patient admits to eating bananas). Will give lokelma 10 grams PO X 1 dose. Change diet to low K. Repeat serum potassium this afternoon. Monitor serum potassium.    7) Hypercalcemia: Check iPTH. Further work up pending results. Monitor serum calcium.    8) Anemia of renal disease: Hb low. Check AM iron stores. Continue with ferrous sulfate. Will consider CHACORTA pending results. Monitor Hb.    9) L renal mass: Not visualized on CT. Outpatient Urology evaluation.      [facility] NEPHROLOGY  Rodrigue Amador M.D.  Rob Perez D.O.  Ana Song M.D.  Lucrecia López, MSN, ANP-C    Telephone: [phone]  Facsimile: [phone]    [address] [place] [id number]

## 2022-11-03 NOTE — PROGRESS NOTE ADULT - ASSESSMENT
71yo female with a PMH of COPD on home O2(3L) with severe pHTN, MVR/TVR , AF s/p ablation,  OHS/MIGUEL on home BiPAP, HTN, HLD who initially presented to the ED as a transfer from [facility] for Aflutter s/p Amiodarone initiation. EP has been recalled due to an episode of Aflutter in the 130s and due to concern for continued Amiodarone use given history of COPD and long term medication toxicity.     Recommendations   - Pt had been off of telemetry until current episode  - Now in Aflutter on telemetry with HRs in the 80s-90s  - Hemodynamically stable currently, reports some positional dyspnea  - Currently on Amiodarone 200mg and Metoprolol 50mg daily   - Continue Warfarin  - Patient not a good candidate for ablation at this time given her respiratory status and inability to tolerate the procedure supine   - Okay to discontinue Amiodarone and begin Diltiazem 120mg , with uptitration as tolerated  71yo female with a PMH of COPD on home O2(3L) with severe pHTN, MVR/TVR , AF s/p ablation,  OHS/MIGUEL on home BiPAP, HTN, HLD who initially presented to the ED as a transfer from [facility] for AFL s/p Amiodarone initiation. EP has been re-consulted due to an episode of AFL in the 130's and due to concern for continued Amiodarone use given history of COPD and long term medication toxicity.    1) AFL   - Discontinued Amiodarone given patient's LAAT not cleared  - Continue Metoprolol/Dilt w/ good rate control   - Nephrology called for MISAEL on CKD w/ hyperkalemia today. Given Lokelma for hyperK+.  Bumex for fluid overload   - Patient endorsed history of GIB years ago however cannot remember details/GI she followed with. Will need GI clearance prior to ELIZABETH DCCV   - Given BMI/supplemental O2 requirements, will plan for ELIZABETH DCCV in OR tomorrow 11/4 pending GI clearance. Please make NPO at 23:59 prior to ELIZABETH DCCV. Will not purse ablation at this time given positional dyspnea (cannot lay flat)   - Continue to dose Coumadin, obtain INR in AM on 11/4   - Ensure active COVID PCR    71yo female with a PMH of COPD on home O2(3L) with severe pHTN, MVR/TVR , AF s/p ablation,  OHS/MIGUEL on home BiPAP, HTN, HLD who initially presented to the ED as a transfer from [facility] for AFL s/p Amiodarone initiation. EP has been re-consulted due to an episode of AFL in the 130's and due to concern for continued Amiodarone use given history of COPD and long term medication toxicity.    1) AFL   - Discontinued Amiodarone given patient's FRAN not cleared  - Continue Metoprolol/Dilt w/ good rate control   - Nephrology called for MISAEL on CKD w/ hyperkalemia today. Given Lokelma for hyperK+.  Bumex for fluid overload   - Patient endorsed history of GIB years ago however cannot remember details/GI she followed with. Will need GI clearance prior to ELIZABETH DCCV   - Given BMI/supplemental O2 requirements, will plan for ELIZABETH DCCV in OR tomorrow 11/4 pending GI clearance. Please make NPO at 23:59 prior to ELIZABETH DCCV. Will not purse ablation at this time given positional dyspnea (cannot lay flat)   - Continue to dose Coumadin, obtain INR in AM on 11/4   - Ensure active COVID PCR    73yo female with a PMH of COPD on home O2(3L) with severe pHTN, MVR/TVR , AF s/p ablation,  OHS/MIGUEL on home BiPAP, HTN, HLD who initially presented to the ED as a transfer from [facility] for AFL s/p Amiodarone initiation. EP has been re-consulted due to an episode of AFL in the 130's and due to concern for continued Amiodarone use given history of COPD and long term medication toxicity.    1) AFL   - Discontinued Amiodarone given patient's FRAN not cleared  - Continue Metoprolol/Dilt w/ good rate control   - Nephrology called for MISAEL on CKD w/ hyperkalemia today. Given Lokelma for hyperK+.  Bumex for fluid overload   - Patient endorsed history of GIB years ago however cannot remember details/GI she followed with. Will need GI clearance prior to ELIZABETH DCCV   - Given BMI/supplemental O2 requirements, will plan for ELIZABETH DCCV in OR tomorrow 11/4 pending GI clearance. Please make NPO at 23:59 prior to ELIZABETH DCCV. Will not pursue ablation at this time given positional dyspnea (cannot lay flat)   - Continue to dose Coumadin, obtain INR in AM on 11/4   - Ensure active COVID PCR     Addendum:   Seen by GI Dr. Galvan, cleared for ELIZABETH DCCV 11/4

## 2022-11-03 NOTE — PROGRESS NOTE ADULT - ASSESSMENT
pt with no acute gi issue now.  she had a gib over a year ago which requried 7 units of blood  she does not remember having endosopcy or colon.  pt is at high risk for endosopic evalation.  she is on iron and coumadin  If the cardiac procedure is indicated I would proceed without furuther gi evalation risk> benefit  will follow with you

## 2022-11-03 NOTE — PROGRESS NOTE ADULT - SUBJECTIVE AND OBJECTIVE BOX
Patient is a 72y Female     Patient is a 72y old  Female who presents with a chief complaint of Vomiting (03 Nov 2022 16:17)      HPI:  71 y/o F with pmhx of COPD on home O2(3L) with severe pHTN, MVR/TVR , AF s/p ablation,  OHS/MIGUEL on home biPAP, HTN, HLD who presents with one week of vomiting with poor oral intake also complaining of SOB and chest pain.  She also states she has been taking prednisone on/off for the past 3 months for chest tightness and does endorse some weight gain.     In the ED pt found to have A Flutter and given Amio 400 po and Dig loaded.  (23 Oct 2022 04:23)      PAST MEDICAL & SURGICAL HISTORY:  Atrial fibrillation  S/P Ablation      Pulmonary hypertension      MIGUEL (obstructive sleep apnea)      COPD (chronic obstructive pulmonary disease)  on home O2      CHF (congestive heart failure)      HTN (hypertension)      Gout      Mitral valve replaced      Tricuspid valve replaced      CHF (congestive heart failure)      Hypertension      COPD (chronic obstructive pulmonary disease)      History of mitral valve replacement with mechanical valve      Tricuspid valve replaced  mechanical          MEDICATIONS  (STANDING):  buMETAnide Injectable 1 milliGRAM(s) IV Push once  diltiazem    milliGRAM(s) Oral daily  ferrous    sulfate 325 milliGRAM(s) Oral daily  insulin glargine Injectable (LANTUS) 12 Unit(s) SubCutaneous at bedtime  insulin lispro (ADMELOG) corrective regimen sliding scale   SubCutaneous three times a day before meals  insulin lispro (ADMELOG) corrective regimen sliding scale   SubCutaneous at bedtime  isosorbide   mononitrate ER Tablet (IMDUR) 30 milliGRAM(s) Oral daily  lidocaine   4% Patch 1 Patch Transdermal daily  metoprolol succinate ER 50 milliGRAM(s) Oral daily  pantoprazole    Tablet 40 milliGRAM(s) Oral before breakfast  polyethylene glycol 3350 17 Gram(s) Oral daily  predniSONE   Tablet 5 milliGRAM(s) Oral daily  senna 2 Tablet(s) Oral at bedtime  simvastatin 10 milliGRAM(s) Oral at bedtime  warfarin 5 milliGRAM(s) Oral once      Allergies    No Known Allergies    Intolerances        SOCIAL HISTORY:  Denies ETOh,Smoking,     FAMILY HISTORY:  Family history of hypertension (Father, Sibling)    Family history of stroke    Family history of hypertension (Mother)        REVIEW OF SYSTEMS:    CONSTITUTIONAL: No weakness, fevers or chills  EYES/ENT: No visual changes;  No vertigo or throat pain   NECK: No pain or stiffness  RESPIRATORY: No cough, wheezing, hemoptysis; No shortness of breath  CARDIOVASCULAR: No chest pain or palpitations  GASTROINTESTINAL: No abdominal or epigastric pain. No nausea, vomiting, or hematemesis; No diarrhea or constipation. No melena or hematochezia.  GENITOURINARY: No dysuria, frequency or hematuria  NEUROLOGICAL: No numbness or weakness  SKIN: No itching, burning, rashes, or lesions   All other review of systems is negative unless indicated above.    VITAL:  T(C): , Max: 36.4 (11-03-22 @ 04:40)  T(F): , Max: 97.6 (11-03-22 @ 04:40)  HR: 65 (11-03-22 @ 15:52)  BP: 127/81 (11-03-22 @ 11:58)  BP(mean): --  RR: 19 (11-03-22 @ 11:58)  SpO2: 97% (11-03-22 @ 15:52)  Wt(kg): --    I and O's:    11-01 @ 07:01  -  11-02 @ 07:00  --------------------------------------------------------  IN: 63 mL / OUT: 650 mL / NET: -587 mL    11-02 @ 07:01  -  11-03 @ 07:00  --------------------------------------------------------  IN: 930 mL / OUT: 0 mL / NET: 930 mL    11-03 @ 07:01  -  11-03 @ 17:13  --------------------------------------------------------  IN: 150 mL / OUT: 0 mL / NET: 150 mL          PHYSICAL EXAM:    Constitutional: NAD  HEENT: PERRLA,   Neck: No JVD  Respiratory: CTA B/L  Cardiovascular: S1 and S2  Gastrointestinal: BS+, soft, NT/ND  Extremities: No peripheral edema  Neurological: A/O x 3, no focal deficits  Psychiatric: Normal mood, normal affect  : No Junior  Skin: No rashes  Access: Not applicable  Back: No CVA tenderness    LABS:    11-03    139  |  95<L>  |  77<H>  ----------------------------<  129<H>  4.9   |  38<H>  |  2.36<H>    Ca    10.4      03 Nov 2022 16:00  Mg     2.6     11-03            RADIOLOGY & ADDITIONAL STUDIES:

## 2022-11-03 NOTE — PROGRESS NOTE ADULT - ASSESSMENT
71 y/o F with pmhx of COPD on home O2(3L) with severe pHTN, MVR/TVR , AF s/p ablation,  OHS/MIGUEL on home biPAP, HTN, HLD who presents with one week of vomiting with poor oral intake also complaining of SOB and chest pain.  She also states she has been taking prednisone on/off for the past 3 months for chest tightness and does endorse some weight gain.     In the ED pt found to have A Flutter and given Amio 400 po and Dig loaded.     #PULM  - Currently on home 3-4 L NC, No exp wheezing   - CXR likely fluid OL  - Continue proair inhalation  - c/w prednisone 5mg since on chronically for 3 months    AFLutter  - Amio loaded -EP f/u about dc amio  - holding digoxin   - EP following  - Continue Metoprolol    MVR/TV replacement off AC  - echo technically difficult, unable to assess due to habitus and positioning  - per chart review under last name Nancy MRN [id number] - mechanical valve replacement 1999, was previously on coumadin but stopped in 09/2021 after worsening anemia, concern for GI bleed & epistaxis.   - continue with ac  -? further eval of valve     ENDO  - A1c 10.7  - FS ~100-130  - Continue home Lantus and premeal, adjust as appropriate

## 2022-11-03 NOTE — PROGRESS NOTE ADULT - SUBJECTIVE AND OBJECTIVE BOX
24H hour events: Rate controlled AFL 50-70's     MEDICATIONS:  buMETAnide Injectable 1 milliGRAM(s) IV Push once  diltiazem    milliGRAM(s) Oral daily  isosorbide   mononitrate ER Tablet (IMDUR) 30 milliGRAM(s) Oral daily  metoprolol succinate ER 50 milliGRAM(s) Oral daily  warfarin 5 milliGRAM(s) Oral once      ALBUTerol    90 MICROgram(s) HFA Inhaler 2 Puff(s) Inhalation every 6 hours PRN    acetaminophen     Tablet .. 650 milliGRAM(s) Oral every 6 hours PRN  melatonin 3 milliGRAM(s) Oral at bedtime PRN    aluminum hydroxide/magnesium hydroxide/simethicone Suspension 30 milliLiter(s) Oral every 4 hours PRN  pantoprazole    Tablet 40 milliGRAM(s) Oral before breakfast  polyethylene glycol 3350 17 Gram(s) Oral daily  senna 2 Tablet(s) Oral at bedtime    insulin glargine Injectable (LANTUS) 12 Unit(s) SubCutaneous at bedtime  insulin lispro (ADMELOG) corrective regimen sliding scale   SubCutaneous three times a day before meals  insulin lispro (ADMELOG) corrective regimen sliding scale   SubCutaneous at bedtime  predniSONE   Tablet 5 milliGRAM(s) Oral daily  simvastatin 10 milliGRAM(s) Oral at bedtime    ferrous    sulfate 325 milliGRAM(s) Oral daily  lidocaine   4% Patch 1 Patch Transdermal daily      REVIEW OF SYSTEMS:  See HPI, otherwise ROS negative.    PHYSICAL EXAM:  T(C): 36.2 (11-03-22 @ 11:58), Max: 36.4 (11-03-22 @ 04:40)  HR: 65 (11-03-22 @ 15:52) (61 - 67)  BP: 127/81 (11-03-22 @ 11:58) (110/70 - 132/82)  RR: 19 (11-03-22 @ 11:58) (18 - 19)  SpO2: 97% (11-03-22 @ 15:52) (96% - 100%)  Wt(kg): --  I&O's Summary    02 Nov 2022 07:01  -  03 Nov 2022 07:00  --------------------------------------------------------  IN: 930 mL / OUT: 0 mL / NET: 930 mL    03 Nov 2022 07:01  -  03 Nov 2022 16:17  --------------------------------------------------------  IN: 150 mL / OUT: 0 mL / NET: 150 mL        Appearance: Alert. NAD	  HEENT: NC/AT  Cardiovascular: +S1S2 irregular no m/g/r  Respiratory: coarse, diminished at bases B/L   Psychiatry: A & O x 3, Mood & affect appropriate  Gastrointestinal:  Soft, NT. ND. +BS	  Skin: warm, well perfused, no masses, rashes or lesions   Neurologic: Non-focal  Extremities: No edema BLE  Vascular: Peripheral pulses palpable 2+ bilaterally      LABS:	 	      11-03    137  |  93<L>  |  81<H>  ----------------------------<  82  5.8<H>   |  33<H>  |  2.31<H>    Ca    10.6<H>      03 Nov 2022 07:01  Mg     2.6     11-03        proBNP: Serum Pro-Brain Natriuretic Peptide: 13899 pg/mL (10.24.22 @ 15:11)      TSH: Thyroid Stimulating Hormone, Serum: 1.40 uIU/mL (10.24.22 @ 01:40)    TELEMETRY: AFL rate controlled 50-70's        24H hour events: Rate controlled AFL 50-70's     MEDICATIONS:  buMETAnide Injectable 1 milliGRAM(s) IV Push once  diltiazem    milliGRAM(s) Oral daily  isosorbide   mononitrate ER Tablet (IMDUR) 30 milliGRAM(s) Oral daily  metoprolol succinate ER 50 milliGRAM(s) Oral daily  warfarin 5 milliGRAM(s) Oral once      ALBUTerol    90 MICROgram(s) HFA Inhaler 2 Puff(s) Inhalation every 6 hours PRN    acetaminophen     Tablet .. 650 milliGRAM(s) Oral every 6 hours PRN  melatonin 3 milliGRAM(s) Oral at bedtime PRN    aluminum hydroxide/magnesium hydroxide/simethicone Suspension 30 milliLiter(s) Oral every 4 hours PRN  pantoprazole    Tablet 40 milliGRAM(s) Oral before breakfast  polyethylene glycol 3350 17 Gram(s) Oral daily  senna 2 Tablet(s) Oral at bedtime    insulin glargine Injectable (LANTUS) 12 Unit(s) SubCutaneous at bedtime  insulin lispro (ADMELOG) corrective regimen sliding scale   SubCutaneous three times a day before meals  insulin lispro (ADMELOG) corrective regimen sliding scale   SubCutaneous at bedtime  predniSONE   Tablet 5 milliGRAM(s) Oral daily  simvastatin 10 milliGRAM(s) Oral at bedtime    ferrous    sulfate 325 milliGRAM(s) Oral daily  lidocaine   4% Patch 1 Patch Transdermal daily      REVIEW OF SYSTEMS:  See HPI, otherwise ROS negative.    PHYSICAL EXAM:  T(C): 36.2 (11-03-22 @ 11:58), Max: 36.4 (11-03-22 @ 04:40)  HR: 65 (11-03-22 @ 15:52) (61 - 67)  BP: 127/81 (11-03-22 @ 11:58) (110/70 - 132/82)  RR: 19 (11-03-22 @ 11:58) (18 - 19)  SpO2: 97% (11-03-22 @ 15:52) (96% - 100%)  Wt(kg): --  I&O's Summary    02 Nov 2022 07:01  -  03 Nov 2022 07:00  --------------------------------------------------------  IN: 930 mL / OUT: 0 mL / NET: 930 mL    03 Nov 2022 07:01  -  03 Nov 2022 16:17  --------------------------------------------------------  IN: 150 mL / OUT: 0 mL / NET: 150 mL        Appearance: Alert. NAD	  HEENT: NC/AT  Cardiovascular: +S1S2 irregular no m/g/r  Respiratory: coarse, diminished at bases B/L   Psychiatry: A & O x 3, Mood & affect appropriate  Gastrointestinal:  Soft, NT. ND. +BS	  Skin: warm, well perfused, no masses, rashes or lesions   Neurologic: Non-focal  Extremities: No edema BLE  Vascular: Peripheral pulses palpable 2+ bilaterally      LABS:	 	      11-03    137  |  93<L>  |  81<H>  ----------------------------<  82  5.8<H>   |  33<H>  |  2.31<H>    Ca    10.6<H>      03 Nov 2022 07:01  Mg     2.6     11-03        proBNP: Serum Pro-Brain Natriuretic Peptide: 32555 pg/mL (10.24.22 @ 15:11)      TSH: Thyroid Stimulating Hormone, Serum: 1.40 uIU/mL (10.24.22 @ 01:40)    TELEMETRY: AFL rate controlled 50-70's     TTE: < from: TTE with Doppler (w/Cont) (10.23.22 @ 11:04) >  CONCLUSIONS:  Technically very difficult study.  1. Mechanical prosthetic mitral valve replacement, which  was poorly visualized.  2.Endocardium not well visualized; grossly mild to  moderate global left ventricular systolic dysfunction.  Endocardial visualization enhanced with intravenous  injection of Ultrasonic Enhancing Agent (Definity).  3. The right ventricle is not well visualized.    < end of copied text >

## 2022-11-03 NOTE — PROGRESS NOTE ADULT - SUBJECTIVE AND OBJECTIVE BOX
SUBJECTIVE / OVERNIGHT EVENTS:pt seen and examined    MEDICATIONS  (STANDING):  diltiazem    milliGRAM(s) Oral daily  ferrous    sulfate 325 milliGRAM(s) Oral daily  insulin glargine Injectable (LANTUS) 12 Unit(s) SubCutaneous at bedtime  insulin lispro (ADMELOG) corrective regimen sliding scale   SubCutaneous three times a day before meals  insulin lispro (ADMELOG) corrective regimen sliding scale   SubCutaneous at bedtime  isosorbide   mononitrate ER Tablet (IMDUR) 30 milliGRAM(s) Oral daily  lidocaine   4% Patch 1 Patch Transdermal daily  metoprolol succinate ER 50 milliGRAM(s) Oral daily  pantoprazole    Tablet 40 milliGRAM(s) Oral before breakfast  polyethylene glycol 3350 17 Gram(s) Oral daily  predniSONE   Tablet 5 milliGRAM(s) Oral daily  senna 2 Tablet(s) Oral at bedtime  simvastatin 10 milliGRAM(s) Oral at bedtime    MEDICATIONS  (PRN):  acetaminophen     Tablet .. 650 milliGRAM(s) Oral every 6 hours PRN Temp greater or equal to 38C (100.4F), Mild Pain (1 - 3)  ALBUTerol    90 MICROgram(s) HFA Inhaler 2 Puff(s) Inhalation every 6 hours PRN Bronchospasm  aluminum hydroxide/magnesium hydroxide/simethicone Suspension 30 milliLiter(s) Oral every 4 hours PRN Dyspepsia  melatonin 3 milliGRAM(s) Oral at bedtime PRN Insomnia    Vital Signs Last 24 Hrs  T(C): 36.9 (11-03-22 @ 20:41), Max: 36.9 (11-03-22 @ 20:41)  T(F): 98.4 (11-03-22 @ 20:41), Max: 98.4 (11-03-22 @ 20:41)  HR: 68 (11-03-22 @ 22:24) (63 - 68)  BP: 128/75 (11-03-22 @ 20:41) (127/81 - 132/82)  BP(mean): --  RR: 18 (11-03-22 @ 20:41) (18 - 19)  SpO2: 98% (11-03-22 @ 22:24) (97% - 100%            Skin: No rash.  Eyes: No recent vision problems or eye pain.  ENT: No congestion, ear pain, or sore throat.  Cardiovascular: No chest pain or palpation.  Respiratory: No cough, shortness of breath, congestion, or wheezing.  Gastrointestinal: No abdominal pain, nausea, vomiting, or diarrhea.  Genitourinary: No dysuria.  Musculoskeletal: No joint swelling.  Neurologic: No headache.    PHYSICAL EXAM:  GENERAL: NAD  EYES: EOMI, PERRLA  NECK: Supple, No JVD  CHEST/LUNG: dec breath sounds at bases  HEART:  S1 , S2 +  ABDOMEN: soft , bs+  EXTREMITIES:  edema+  NEUROLOGY:alert awake oriented    LABS:  11-03    139  |  95<L>  |  77<H>  ----------------------------<  129<H>  4.9   |  38<H>  |  2.36<H>    Ca    10.4      03 Nov 2022 16:00  Mg     2.6     11-03      Creatinine Trend: 2.36 <--, 2.31 <--, 2.20 <--, 2.46 <--, 2.73 <--, 3.01 <--, 3.16 <--, 3.46 <--    Urine Studies:              PT/INR - ( 03 Nov 2022 06:58 )   PT: 29.6 sec;   INR: 2.53 ratio         PTT - ( 02 Nov 2022 07:16 )  PTT:85.0 sec

## 2022-11-04 LAB
ANION GAP SERPL CALC-SCNC: 10 MMOL/L — SIGNIFICANT CHANGE UP (ref 5–17)
APPEARANCE UR: CLEAR — SIGNIFICANT CHANGE UP
BACTERIA # UR AUTO: NEGATIVE — SIGNIFICANT CHANGE UP
BASE EXCESS BLDV CALC-SCNC: 12.8 MMOL/L — HIGH (ref -2–3)
BILIRUB UR-MCNC: NEGATIVE — SIGNIFICANT CHANGE UP
BUN SERPL-MCNC: 78 MG/DL — HIGH (ref 7–23)
CALCIUM SERPL-MCNC: 10 MG/DL — SIGNIFICANT CHANGE UP (ref 8.4–10.5)
CALCIUM SERPL-MCNC: 10.3 MG/DL — SIGNIFICANT CHANGE UP (ref 8.4–10.5)
CHLORIDE SERPL-SCNC: 95 MMOL/L — LOW (ref 96–108)
CO2 BLDV-SCNC: 47 MMOL/L — HIGH (ref 22–26)
CO2 SERPL-SCNC: 33 MMOL/L — HIGH (ref 22–31)
COLOR SPEC: SIGNIFICANT CHANGE UP
CREAT ?TM UR-MCNC: 51 MG/DL — SIGNIFICANT CHANGE UP
CREAT SERPL-MCNC: 2.3 MG/DL — HIGH (ref 0.5–1.3)
DIFF PNL FLD: NEGATIVE — SIGNIFICANT CHANGE UP
EGFR: 22 ML/MIN/1.73M2 — LOW
EPI CELLS # UR: 1 /HPF — SIGNIFICANT CHANGE UP
FERRITIN SERPL-MCNC: 126 NG/ML — SIGNIFICANT CHANGE UP (ref 15–150)
GAS PNL BLDV: SIGNIFICANT CHANGE UP
GLUCOSE BLDC GLUCOMTR-MCNC: 104 MG/DL — HIGH (ref 70–99)
GLUCOSE BLDC GLUCOMTR-MCNC: 105 MG/DL — HIGH (ref 70–99)
GLUCOSE BLDC GLUCOMTR-MCNC: 120 MG/DL — HIGH (ref 70–99)
GLUCOSE BLDC GLUCOMTR-MCNC: 160 MG/DL — HIGH (ref 70–99)
GLUCOSE BLDC GLUCOMTR-MCNC: 181 MG/DL — HIGH (ref 70–99)
GLUCOSE SERPL-MCNC: 93 MG/DL — SIGNIFICANT CHANGE UP (ref 70–99)
GLUCOSE UR QL: NEGATIVE — SIGNIFICANT CHANGE UP
HCO3 BLDV-SCNC: 44 MMOL/L — HIGH (ref 22–29)
HCT VFR BLD CALC: 34.3 % — LOW (ref 34.5–45)
HGB BLD-MCNC: 9.7 G/DL — LOW (ref 11.5–15.5)
HYALINE CASTS # UR AUTO: 3 /LPF — HIGH (ref 0–2)
INR BLD: 3.54 RATIO — HIGH (ref 0.88–1.16)
IRON SATN MFR SERPL: 12 % — LOW (ref 14–50)
IRON SATN MFR SERPL: 44 UG/DL — SIGNIFICANT CHANGE UP (ref 30–160)
KETONES UR-MCNC: NEGATIVE — SIGNIFICANT CHANGE UP
LEUKOCYTE ESTERASE UR-ACNC: NEGATIVE — SIGNIFICANT CHANGE UP
MAGNESIUM SERPL-MCNC: 2.4 MG/DL — SIGNIFICANT CHANGE UP (ref 1.6–2.6)
MCHC RBC-ENTMCNC: 28.3 GM/DL — LOW (ref 32–36)
MCHC RBC-ENTMCNC: 29.1 PG — SIGNIFICANT CHANGE UP (ref 27–34)
MCV RBC AUTO: 103 FL — HIGH (ref 80–100)
NITRITE UR-MCNC: NEGATIVE — SIGNIFICANT CHANGE UP
NRBC # BLD: 0 /100 WBCS — SIGNIFICANT CHANGE UP (ref 0–0)
PCO2 BLDV: 96 MMHG — HIGH (ref 39–42)
PH BLDV: 7.27 — LOW (ref 7.32–7.43)
PH UR: 6.5 — SIGNIFICANT CHANGE UP (ref 5–8)
PLATELET # BLD AUTO: 244 K/UL — SIGNIFICANT CHANGE UP (ref 150–400)
PO2 BLDV: 31 MMHG — SIGNIFICANT CHANGE UP (ref 25–45)
POTASSIUM SERPL-MCNC: 5 MMOL/L — SIGNIFICANT CHANGE UP (ref 3.5–5.3)
POTASSIUM SERPL-SCNC: 5 MMOL/L — SIGNIFICANT CHANGE UP (ref 3.5–5.3)
PROT UR-MCNC: NEGATIVE — SIGNIFICANT CHANGE UP
PROTHROM AB SERPL-ACNC: 41.6 SEC — HIGH (ref 10.5–13.4)
PTH-INTACT FLD-MCNC: 130 PG/ML — HIGH (ref 15–65)
RBC # BLD: 3.33 M/UL — LOW (ref 3.8–5.2)
RBC # FLD: 15.3 % — HIGH (ref 10.3–14.5)
RBC CASTS # UR COMP ASSIST: 0 /HPF — SIGNIFICANT CHANGE UP (ref 0–4)
SAO2 % BLDV: 48.4 % — LOW (ref 67–88)
SODIUM SERPL-SCNC: 138 MMOL/L — SIGNIFICANT CHANGE UP (ref 135–145)
SODIUM UR-SCNC: 86 MMOL/L — SIGNIFICANT CHANGE UP
SP GR SPEC: 1.01 — SIGNIFICANT CHANGE UP (ref 1.01–1.02)
TIBC SERPL-MCNC: 370 UG/DL — SIGNIFICANT CHANGE UP (ref 220–430)
UIBC SERPL-MCNC: 326 UG/DL — SIGNIFICANT CHANGE UP (ref 110–370)
UROBILINOGEN FLD QL: NEGATIVE — SIGNIFICANT CHANGE UP
UUN UR-MCNC: 440 MG/DL — SIGNIFICANT CHANGE UP
WBC # BLD: 5.81 K/UL — SIGNIFICANT CHANGE UP (ref 3.8–10.5)
WBC # FLD AUTO: 5.81 K/UL — SIGNIFICANT CHANGE UP (ref 3.8–10.5)
WBC UR QL: 1 /HPF — SIGNIFICANT CHANGE UP (ref 0–5)

## 2022-11-04 PROCEDURE — 99232 SBSQ HOSP IP/OBS MODERATE 35: CPT

## 2022-11-04 PROCEDURE — 93010 ELECTROCARDIOGRAM REPORT: CPT

## 2022-11-04 PROCEDURE — 92960 CARDIOVERSION ELECTRIC EXT: CPT

## 2022-11-04 RX ORDER — LANOLIN ALCOHOL/MO/W.PET/CERES
3 CREAM (GRAM) TOPICAL AT BEDTIME
Refills: 0 | Status: DISCONTINUED | OUTPATIENT
Start: 2022-11-04 | End: 2022-11-16

## 2022-11-04 RX ORDER — INSULIN LISPRO 100/ML
VIAL (ML) SUBCUTANEOUS AT BEDTIME
Refills: 0 | Status: DISCONTINUED | OUTPATIENT
Start: 2022-11-04 | End: 2022-11-16

## 2022-11-04 RX ORDER — DEXTROSE 50 % IN WATER 50 %
25 SYRINGE (ML) INTRAVENOUS ONCE
Refills: 0 | Status: COMPLETED | OUTPATIENT
Start: 2022-11-04 | End: 2022-11-04

## 2022-11-04 RX ORDER — DILTIAZEM HCL 120 MG
120 CAPSULE, EXT RELEASE 24 HR ORAL DAILY
Refills: 0 | Status: DISCONTINUED | OUTPATIENT
Start: 2022-11-04 | End: 2022-11-05

## 2022-11-04 RX ORDER — ISOSORBIDE MONONITRATE 60 MG/1
30 TABLET, EXTENDED RELEASE ORAL DAILY
Refills: 0 | Status: DISCONTINUED | OUTPATIENT
Start: 2022-11-04 | End: 2022-11-16

## 2022-11-04 RX ORDER — INSULIN LISPRO 100/ML
VIAL (ML) SUBCUTANEOUS
Refills: 0 | Status: DISCONTINUED | OUTPATIENT
Start: 2022-11-04 | End: 2022-11-16

## 2022-11-04 RX ORDER — AMIODARONE HYDROCHLORIDE 400 MG/1
TABLET ORAL
Refills: 0 | Status: DISCONTINUED | OUTPATIENT
Start: 2022-11-04 | End: 2022-11-04

## 2022-11-04 RX ORDER — SIMVASTATIN 20 MG/1
10 TABLET, FILM COATED ORAL AT BEDTIME
Refills: 0 | Status: DISCONTINUED | OUTPATIENT
Start: 2022-11-04 | End: 2022-11-16

## 2022-11-04 RX ORDER — POLYETHYLENE GLYCOL 3350 17 G/17G
17 POWDER, FOR SOLUTION ORAL DAILY
Refills: 0 | Status: DISCONTINUED | OUTPATIENT
Start: 2022-11-04 | End: 2022-11-16

## 2022-11-04 RX ORDER — LIDOCAINE 4 G/100G
1 CREAM TOPICAL DAILY
Refills: 0 | Status: DISCONTINUED | OUTPATIENT
Start: 2022-11-04 | End: 2022-11-16

## 2022-11-04 RX ORDER — PANTOPRAZOLE SODIUM 20 MG/1
40 TABLET, DELAYED RELEASE ORAL
Refills: 0 | Status: DISCONTINUED | OUTPATIENT
Start: 2022-11-04 | End: 2022-11-16

## 2022-11-04 RX ORDER — AMIODARONE HYDROCHLORIDE 400 MG/1
400 TABLET ORAL
Refills: 0 | Status: DISCONTINUED | OUTPATIENT
Start: 2022-11-04 | End: 2022-11-05

## 2022-11-04 RX ORDER — ACETAMINOPHEN 500 MG
650 TABLET ORAL EVERY 6 HOURS
Refills: 0 | Status: DISCONTINUED | OUTPATIENT
Start: 2022-11-04 | End: 2022-11-16

## 2022-11-04 RX ORDER — SENNA PLUS 8.6 MG/1
2 TABLET ORAL AT BEDTIME
Refills: 0 | Status: DISCONTINUED | OUTPATIENT
Start: 2022-11-04 | End: 2022-11-16

## 2022-11-04 RX ORDER — AMIODARONE HYDROCHLORIDE 400 MG/1
400 TABLET ORAL EVERY 8 HOURS
Refills: 0 | Status: DISCONTINUED | OUTPATIENT
Start: 2022-11-04 | End: 2022-11-04

## 2022-11-04 RX ORDER — METOPROLOL TARTRATE 50 MG
50 TABLET ORAL DAILY
Refills: 0 | Status: DISCONTINUED | OUTPATIENT
Start: 2022-11-04 | End: 2022-11-05

## 2022-11-04 RX ORDER — FERROUS SULFATE 325(65) MG
325 TABLET ORAL DAILY
Refills: 0 | Status: DISCONTINUED | OUTPATIENT
Start: 2022-11-04 | End: 2022-11-07

## 2022-11-04 RX ORDER — SODIUM CHLORIDE 9 MG/ML
1000 INJECTION, SOLUTION INTRAVENOUS
Refills: 0 | Status: DISCONTINUED | OUTPATIENT
Start: 2022-11-04 | End: 2022-11-04

## 2022-11-04 RX ORDER — BUMETANIDE 0.25 MG/ML
2 INJECTION INTRAMUSCULAR; INTRAVENOUS DAILY
Refills: 0 | Status: CANCELLED | OUTPATIENT
Start: 2022-11-05 | End: 2022-11-04

## 2022-11-04 RX ORDER — BUMETANIDE 0.25 MG/ML
1 INJECTION INTRAMUSCULAR; INTRAVENOUS ONCE
Refills: 0 | Status: DISCONTINUED | OUTPATIENT
Start: 2022-11-04 | End: 2022-11-04

## 2022-11-04 RX ORDER — ALBUTEROL 90 UG/1
2 AEROSOL, METERED ORAL EVERY 6 HOURS
Refills: 0 | Status: DISCONTINUED | OUTPATIENT
Start: 2022-11-04 | End: 2022-11-16

## 2022-11-04 RX ORDER — INSULIN GLARGINE 100 [IU]/ML
12 INJECTION, SOLUTION SUBCUTANEOUS AT BEDTIME
Refills: 0 | Status: DISCONTINUED | OUTPATIENT
Start: 2022-11-04 | End: 2022-11-16

## 2022-11-04 RX ORDER — BUMETANIDE 0.25 MG/ML
2 INJECTION INTRAMUSCULAR; INTRAVENOUS DAILY
Refills: 0 | Status: DISCONTINUED | OUTPATIENT
Start: 2022-11-04 | End: 2022-11-16

## 2022-11-04 RX ORDER — AMIODARONE HYDROCHLORIDE 400 MG/1
400 TABLET ORAL EVERY 8 HOURS
Refills: 0 | Status: DISCONTINUED | OUTPATIENT
Start: 2022-11-04 | End: 2022-11-05

## 2022-11-04 RX ADMIN — Medication 120 MILLIGRAM(S): at 05:39

## 2022-11-04 RX ADMIN — Medication 2: at 21:58

## 2022-11-04 RX ADMIN — Medication 5 MILLIGRAM(S): at 09:53

## 2022-11-04 RX ADMIN — INSULIN GLARGINE 12 UNIT(S): 100 INJECTION, SOLUTION SUBCUTANEOUS at 21:58

## 2022-11-04 RX ADMIN — SIMVASTATIN 10 MILLIGRAM(S): 20 TABLET, FILM COATED ORAL at 22:33

## 2022-11-04 RX ADMIN — Medication 25 MILLILITER(S): at 14:44

## 2022-11-04 RX ADMIN — LIDOCAINE 1 PATCH: 4 CREAM TOPICAL at 21:30

## 2022-11-04 RX ADMIN — LIDOCAINE 1 PATCH: 4 CREAM TOPICAL at 15:01

## 2022-11-04 RX ADMIN — AMIODARONE HYDROCHLORIDE 400 MILLIGRAM(S): 400 TABLET ORAL at 21:58

## 2022-11-04 RX ADMIN — Medication 50 MILLIGRAM(S): at 05:39

## 2022-11-04 NOTE — PROGRESS NOTE ADULT - SUBJECTIVE AND OBJECTIVE BOX
24H hour events: No acute events    MEDICATIONS:  diltiazem    milliGRAM(s) Oral daily  isosorbide   mononitrate ER Tablet (IMDUR) 30 milliGRAM(s) Oral daily  metoprolol succinate ER 50 milliGRAM(s) Oral daily  ALBUTerol    90 MICROgram(s) HFA Inhaler 2 Puff(s) Inhalation every 6 hours PRN  acetaminophen     Tablet .. 650 milliGRAM(s) Oral every 6 hours PRN  melatonin 3 milliGRAM(s) Oral at bedtime PRN  aluminum hydroxide/magnesium hydroxide/simethicone Suspension 30 milliLiter(s) Oral every 4 hours PRN  pantoprazole    Tablet 40 milliGRAM(s) Oral before breakfast  polyethylene glycol 3350 17 Gram(s) Oral daily  senna 2 Tablet(s) Oral at bedtime  insulin glargine Injectable (LANTUS) 12 Unit(s) SubCutaneous at bedtime  insulin lispro (ADMELOG) corrective regimen sliding scale   SubCutaneous three times a day before meals  insulin lispro (ADMELOG) corrective regimen sliding scale   SubCutaneous at bedtime  predniSONE   Tablet 5 milliGRAM(s) Oral daily  simvastatin 10 milliGRAM(s) Oral at bedtime  ferrous    sulfate 325 milliGRAM(s) Oral daily  lidocaine   4% Patch 1 Patch Transdermal daily      REVIEW OF SYSTEMS:  Complete 12 point ROS negative.    PHYSICAL EXAM:  T(C): 36.4 (11-04-22 @ 04:49), Max: 36.9 (11-03-22 @ 20:41)  HR: 63 (11-04-22 @ 05:01) (63 - 68)  BP: 120/65 (11-04-22 @ 04:49) (120/65 - 128/75)  RR: 18 (11-04-22 @ 04:49) (18 - 19)  SpO2: 98% (11-04-22 @ 05:01) (97% - 100%)  Wt(kg): --  I&O's Summary    03 Nov 2022 07:01  -  04 Nov 2022 07:00  --------------------------------------------------------  IN: 390 mL / OUT: 0 mL / NET: 390 mL        Appearance: Normal	  HEENT:  PERRL, EOMI	  Cardiovascular: Normal S1 S2, No JVD, No murmurs, No edema  Respiratory: Lungs clear to auscultation	  Psychiatry: A & O x 3, Mood & affect appropriate  Gastrointestinal:  Soft, Non-tender, + BS	  Skin: No rashes, No ecchymoses, No cyanosis	  Neurologic: Non-focal  Extremities: No clubbing, cyanosis or edema  Vascular: Peripheral pulses palpable 2+ bilaterally        LABS:	 	    CBC Full  -  ( 04 Nov 2022 07:06 )  WBC Count : 5.81 K/uL  Hemoglobin : 9.7 g/dL  Hematocrit : 34.3 %  Platelet Count - Automated : 244 K/uL  Mean Cell Volume : 103.0 fl  Mean Cell Hemoglobin : 29.1 pg  Mean Cell Hemoglobin Concentration : 28.3 gm/dL  Auto Neutrophil # : x  Auto Lymphocyte # : x  Auto Monocyte # : x  Auto Eosinophil # : x  Auto Basophil # : x  Auto Neutrophil % : x  Auto Lymphocyte % : x  Auto Monocyte % : x  Auto Eosinophil % : x  Auto Basophil % : x    11-04    138  |  95<L>  |  78<H>  ----------------------------<  93  5.0   |  33<H>  |  2.30<H>  11-03    139  |  95<L>  |  77<H>  ----------------------------<  129<H>  4.9   |  38<H>  |  2.36<H>    Ca    10.3      04 Nov 2022 07:06  Ca    10.4      03 Nov 2022 16:00  Mg     2.4     11-04  Mg     2.6     11-03      TELEMETRY: AF 60-110s	      < from: TTE with Doppler (w/Cont) (10.23.22 @ 11:04) >  PROCEDURE: Transthoracic echocardiogram with 2-D, M-Mode  and complete spectral and colorflow Doppler. Verbal  consent was obtained for injection of  Ultrasonic Enhancing  Agent following a discussion of risks and benefits.  Following intravenous injection of Ultrasonic Enhancing  Agent, harmonic imaging was performed.  INDICATION: Abnormal electrocardiogram (ECG) (EKG) (R94.31)  ------------------------------------------------------------------------  CONCLUSIONS:  Technically very difficult study.  1. Mechanical prosthetic mitral valve replacement, which  was poorly visualized.  2.Endocardium not well visualized; grossly mild to  moderate global left ventricular systolic dysfunction.  Endocardial visualization enhanced with intravenous  injection of Ultrasonic Enhancing Agent (Definity).  3. The right ventricle is not well visualized.    < end of copied text >

## 2022-11-04 NOTE — PROGRESS NOTE ADULT - SUBJECTIVE AND OBJECTIVE BOX
Ms. Alcantara reports no significant complaints.    Vital Signs Last 24 Hrs  T(C): 36.4 (04 Nov 2022 12:00), Max: 36.9 (03 Nov 2022 20:41)  T(F): 97.5 (04 Nov 2022 12:00), Max: 98.4 (03 Nov 2022 20:41)  HR: 65 (04 Nov 2022 12:00) (63 - 68)  BP: 110/61 (04 Nov 2022 12:00) (110/61 - 128/75)  RR: 18 (04 Nov 2022 12:00) (18 - 18)  SpO2: 97% (04 Nov 2022 12:00) (96% - 100%)    Physical exam  General – awake, alert, comfortable, sitting upright  CV – distant heart sounds, unable to assess JVP  Lungs – CTAB  Abdomen – S/NT/ND  Extremities – 1+ BRITTA Patelsch – A/O x 3, appropriate affect    Tele:   Atrial fibrillation 60-85 bpm (briefly up to 120 bmp)    TTE 10/23/2022  Technically difficult study  Mild to moderate LVD    Labs:  11-04    138  |  95<L>  |  78<H>  ----------------------------<  93  5.0   |  33<H>  |  2.30<H>    Ca    10.3      04 Nov 2022 07:06  Mg     2.4     11-04                          9.7    5.81  )-----------( 244      ( 04 Nov 2022 07:06 )             34.3     MEDICATIONS  (STANDING):  buMETAnide Injectable 1 milliGRAM(s) IV Push once  dextrose 5%. 1000 milliLiter(s) (30 mL/Hr) IV Continuous <Continuous>  dextrose 50% Injectable 25 milliLiter(s) IV Push once  diltiazem    milliGRAM(s) Oral daily  ferrous    sulfate 325 milliGRAM(s) Oral daily  insulin glargine Injectable (LANTUS) 12 Unit(s) SubCutaneous at bedtime  insulin lispro (ADMELOG) corrective regimen sliding scale   SubCutaneous three times a day before meals  insulin lispro (ADMELOG) corrective regimen sliding scale   SubCutaneous at bedtime  isosorbide   mononitrate ER Tablet (IMDUR) 30 milliGRAM(s) Oral daily  lidocaine   4% Patch 1 Patch Transdermal daily  metoprolol succinate ER 50 milliGRAM(s) Oral daily  pantoprazole    Tablet 40 milliGRAM(s) Oral before breakfast  polyethylene glycol 3350 17 Gram(s) Oral daily  predniSONE   Tablet 5 milliGRAM(s) Oral daily  senna 2 Tablet(s) Oral at bedtime  simvastatin 10 milliGRAM(s) Oral at bedtime    Impression:  COPD on home O2 (3L)  pHTN  Mechanical MV 1999  TVR  AF status/post ablation    Admitted with vomiting, decreased PO intake, dyspnea, and chest pain  Found to be in atrial flutter treated with amio     Recommendations:  Mechanical MV and AF –  warfarin    Plan for DCCV in OR today    Rate control with CCB/BB    Appreciate EP input    A total of 25 minutes was spent on this patient encounter.

## 2022-11-04 NOTE — PROGRESS NOTE ADULT - SUBJECTIVE AND OBJECTIVE BOX
[facility] NEPHROLOGY- PROGRESS NOTE    72 year old Female with history of COPD, CHF presents with SOB. Nephrology consulted for elevated Scr.    For cardioversion today.    REVIEW OF SYSTEMS:  Gen: no fevers  Cards: no chest pain  Resp: + dyspnea with exertion improving  GI: no nausea or vomiting or diarrhea  Vascular: + LE edema improving    No Known Allergies      Hospital Medications: Medications reviewed    VITALS:  T(F): 97.6 (22 @ 04:49), Max: 98.4 (22 @ 20:41)  HR: 64 (22 10:13)  BP: 120/65 (22 @ 04:49)  RR: 18 (22 04:49)  SpO2: 96% (22 10:13)  Wt(kg): --  Height (cm): 162.6 (10-23 @ 04:30), 162.6 (10-22 @ 19:34)  Weight (kg): 112.5 (10-29 @ 11:58), 108.9 (10-22 @ 19:34)  BMI (kg/m2): 42.6 (10-29 @ 11:58), 41.2 (10-23 @ 04:30), 41.2 (10-22 @ 19:34)  BSA (m2): 2.14 (10-29 @ 11:58), 2.11 (10-23 @ 04:30), 2.11 (10-22 @ 19:34)     @ 07:01  -   @ 07:00  --------------------------------------------------------  IN: 390 mL / OUT: 0 mL / NET: 390 mL        PHYSICAL EXAM:    Gen: NAD, calm  Cards: Irregularly irregular, +S1/S2, no M/G/R  Resp: CTA B/L  GI: soft, NT/ND, NABS  Vascular: 1+ LE edema B/L    LABS:      138  |  95<L>  |  78<H>  ----------------------------<  93  5.0   |  33<H>  |  2.30<H>    Ca    10.3      2022 07:06  Mg     2.4           Creatinine Trend: 2.30 <--, 2.36 <--, 2.31 <--, 2.20 <--, 2.46 <--, 2.73 <--, 3.01 <--, 3.16 <--                        9.7    5.81  )-----------( 244      ( 2022 07:06 )             34.3     Urine Studies:  Urinalysis Basic - ( 2022 03:01 )    Color: Light Yellow / Appearance: Clear / S.012 / pH:   Gluc:  / Ketone: Negative  / Bili: Negative / Urobili: Negative   Blood:  / Protein: Negative / Nitrite: Negative   Leuk Esterase: Negative / RBC: 0 /hpf / WBC 1 /HPF   Sq Epi:  / Non Sq Epi: 1 /hpf / Bacteria: Negative      Sodium, Random Urine: 86 mmol/L ( @ 03:02)  Creatinine, Random Urine: 51 mg/dL ( @ 03:02)      RADIOLOGY & ADDITIONAL STUDIES:

## 2022-11-04 NOTE — PRE-ANESTHESIA EVALUATION ADULT - BSA (M2)
Location of pain: R shoulder  Pain score: 9/10  Procedure? No   Fever, chills, weakness, redness or any drainage at site? N/A   Any new or worsening symptoms? Yes - worsening R shoulder pain  Loss of bladder or bowel control? No  Medications/pain relief tried:   - Tylenol  - tramadol  - ice    Patient reports that several days ago she was trying to get off the toilet and pulled up on the side rail, and since then she has had severe pain in her R shoulder. She stated the R shoulder injection on 12/6/19 was helpful for her shoulder until this incident. She has been taking Tylenol, tramadol, and using ice without relief. She is wondering if muscle relaxers would be helpful for her pain; she recalls taking them in the past but does not remember which ones, or whether they were helpful.     Advised patient to continue all current interventions and recommended trying heat as well as ice to help with muscle pain/spasms. Will advise with Dr. Briones re: muscle relaxers and any other recommendations. Encouraged patient to call pain line at [phone] with any questions/concerns. Patient verbalized understanding.   2.14

## 2022-11-04 NOTE — PROGRESS NOTE ADULT - ASSESSMENT
pt is a poor historian  no sing of bleed in recent past  would continue with plan for ablation if appropriate  pt high risk fo endosopc evalaiton  will follow with you

## 2022-11-04 NOTE — PRE-ANESTHESIA EVALUATION ADULT - NSANTHPMHFT_GEN_ALL_CORE
71 y/o F with pmhx of COPD on home O2(3L) with severe pHTN, MVR/TVR , AF s/p ablation,  OHS/MIGUEL on home biPAP, HTN, HLD who presents with one week of vomiting with poor oral intake also complaining of SOB and chest pain. Found to have a flutter.

## 2022-11-04 NOTE — PROGRESS NOTE ADULT - SUBJECTIVE AND OBJECTIVE BOX
SUBJECTIVE / OVERNIGHT EVENTS:pt seen and examined    MEDICATIONS  (STANDING):  aMIOdarone    Tablet 400  Oral   aMIOdarone    Tablet 400 milliGRAM(s) Oral every 8 hours  buMETAnide 2 milliGRAM(s) Oral daily  diltiazem    milliGRAM(s) Oral daily  ferrous    sulfate 325 milliGRAM(s) Oral daily  insulin glargine Injectable (LANTUS) 12 Unit(s) SubCutaneous at bedtime  insulin lispro (ADMELOG) corrective regimen sliding scale   SubCutaneous at bedtime  insulin lispro (ADMELOG) corrective regimen sliding scale   SubCutaneous three times a day before meals  isosorbide   mononitrate ER Tablet (IMDUR) 30 milliGRAM(s) Oral daily  lidocaine   4% Patch 1 Patch Transdermal daily  metoprolol succinate ER 50 milliGRAM(s) Oral daily  pantoprazole    Tablet 40 milliGRAM(s) Oral before breakfast  polyethylene glycol 3350 17 Gram(s) Oral daily  predniSONE   Tablet 5 milliGRAM(s) Oral daily  senna 2 Tablet(s) Oral at bedtime  simvastatin 10 milliGRAM(s) Oral at bedtime    MEDICATIONS  (PRN):  acetaminophen     Tablet .. 650 milliGRAM(s) Oral every 6 hours PRN Temp greater or equal to 38C (100.4F), Mild Pain (1 - 3)  ALBUTerol    90 MICROgram(s) HFA Inhaler 2 Puff(s) Inhalation every 6 hours PRN Bronchospasm  melatonin 3 milliGRAM(s) Oral at bedtime PRN Insomnia    Vital Signs Last 24 Hrs  T(C): 36.4 (22 @ 21:24), Max: 36.8 (22 @ 17:50)  T(F): 97.5 (22 @ 21:24), Max: 98.2 (22 @ 17:50)  HR: 60 (22 @ 21:24) (56 - 86)  BP: 132/77 (22 @ 21:24) (110/61 - 134/64)  BP(mean): 79 (22 @ 17:28) (79 - 79)  RR: 18 (22 @ 21:24) (16 - 18)  SpO2: 97% (22 @ 21:24) (94% - 100%)            Skin: No rash.  Eyes: No recent vision problems or eye pain.  ENT: No congestion, ear pain, or sore throat.  Cardiovascular: No chest pain or palpation.  Respiratory: No cough, shortness of breath, congestion, or wheezing.  Gastrointestinal: No abdominal pain, nausea, vomiting, or diarrhea.  Genitourinary: No dysuria.  Musculoskeletal: No joint swelling.  Neurologic: No headache.    PHYSICAL EXAM:  GENERAL: NAD  EYES: EOMI, PERRLA  NECK: Supple, No JVD  CHEST/LUNG: dec breath sounds at bases  HEART:  S1 , S2 +  ABDOMEN: soft , bs+  EXTREMITIES:  edema+  NEUROLOGY:alert awake oriented    LABS:      138  |  95<L>  |  78<H>  ----------------------------<  93  5.0   |  33<H>  |  2.30<H>    Ca    10.3      2022 07:06  Mg     2.4           Creatinine Trend: 2.30 <--, 2.36 <--, 2.31 <--, 2.20 <--, 2.46 <--, 2.73 <--, 3.01 <--, 3.16 <--                        9.7    5.81  )-----------( 244      ( 2022 07:06 )             34.3     Urine Studies:  Urinalysis Basic - ( 2022 03:01 )    Color: Light Yellow / Appearance: Clear / S.012 / pH:   Gluc:  / Ketone: Negative  / Bili: Negative / Urobili: Negative   Blood:  / Protein: Negative / Nitrite: Negative   Leuk Esterase: Negative / RBC: 0 /hpf / WBC 1 /HPF   Sq Epi:  / Non Sq Epi: 1 /hpf / Bacteria: Negative      Sodium, Random Urine: 86 mmol/L ( @ 03:02)  Creatinine, Random Urine: 51 mg/dL ( @ 03:02)            PT/INR - ( 2022 07:10 )   PT: 41.6 sec;   INR: 3.54 ratio              PTT - ( 2022 07:16 )  PTT:85.0 sec

## 2022-11-04 NOTE — PROGRESS NOTE ADULT - ASSESSMENT
73 y/o F with pmhx of COPD on home O2(3L) with severe pHTN, MVR/TVR , AF s/p ablation,  OHS/MIGUEL on home biPAP, HTN, HLD who presents with one week of vomiting with poor oral intake also complaining of SOB and chest pain.  She also states she has been taking prednisone on/off for the past 3 months for chest tightness and does endorse some weight gain.     In the ED pt found to have A Flutter and given Amio 400 po and Dig loaded.     #PULM  - Currently on home 3-4 L NC, No exp wheezing   - CXR likely fluid OL  - Continue proair inhalation  - c/w prednisone 5mg since on chronically for 3 months    AFLutter  - Amio loaded -EP f/u about dc amio  - holding digoxin   - EP following  - Continue Metoprolol    MVR/TV replacement off AC  - echo technically difficult, unable to assess due to habitus and positioning  - per chart review under last name aNncy MRN [id number] - mechanical valve replacement 1999, was previously on coumadin but stopped in 09/2021 after worsening anemia, concern for GI bleed & epistaxis.   - continue with ac  -? further eval of valve     ENDO  - A1c 10.7  - FS ~100-130  - Continue home Lantus and premeal, adjust as appropriate

## 2022-11-04 NOTE — PROGRESS NOTE ADULT - SUBJECTIVE AND OBJECTIVE BOX
Patient is a 72y Female     Patient is a 72y old  Female who presents with a chief complaint of Vomiting (03 Nov 2022 17:12)      HPI:  71 y/o F with pmhx of COPD on home O2(3L) with severe pHTN, MVR/TVR , AF s/p ablation,  OHS/MIGUEL on home biPAP, HTN, HLD who presents with one week of vomiting with poor oral intake also complaining of SOB and chest pain.  She also states she has been taking prednisone on/off for the past 3 months for chest tightness and does endorse some weight gain.     In the ED pt found to have A Flutter and given Amio 400 po and Dig loaded.  (23 Oct 2022 04:23)      PAST MEDICAL & SURGICAL HISTORY:  Atrial fibrillation  S/P Ablation      Pulmonary hypertension      MIGUEL (obstructive sleep apnea)      COPD (chronic obstructive pulmonary disease)  on home O2      CHF (congestive heart failure)      HTN (hypertension)      Gout      Mitral valve replaced      Tricuspid valve replaced      CHF (congestive heart failure)      Hypertension      COPD (chronic obstructive pulmonary disease)      History of mitral valve replacement with mechanical valve      Tricuspid valve replaced  mechanical          MEDICATIONS  (STANDING):  diltiazem    milliGRAM(s) Oral daily  ferrous    sulfate 325 milliGRAM(s) Oral daily  insulin glargine Injectable (LANTUS) 12 Unit(s) SubCutaneous at bedtime  insulin lispro (ADMELOG) corrective regimen sliding scale   SubCutaneous three times a day before meals  insulin lispro (ADMELOG) corrective regimen sliding scale   SubCutaneous at bedtime  isosorbide   mononitrate ER Tablet (IMDUR) 30 milliGRAM(s) Oral daily  lidocaine   4% Patch 1 Patch Transdermal daily  metoprolol succinate ER 50 milliGRAM(s) Oral daily  pantoprazole    Tablet 40 milliGRAM(s) Oral before breakfast  polyethylene glycol 3350 17 Gram(s) Oral daily  predniSONE   Tablet 5 milliGRAM(s) Oral daily  senna 2 Tablet(s) Oral at bedtime  simvastatin 10 milliGRAM(s) Oral at bedtime      Allergies    No Known Allergies    Intolerances        SOCIAL HISTORY:  Denies ETOh,Smoking,     FAMILY HISTORY:  Family history of hypertension (Father, Sibling)    Family history of stroke    Family history of hypertension (Mother)        REVIEW OF SYSTEMS:    CONSTITUTIONAL: No weakness, fevers or chills  EYES/ENT: No visual changes;  No vertigo or throat pain   NECK: No pain or stiffness  RESPIRATORY: No cough, wheezing, hemoptysis; No shortness of breath  CARDIOVASCULAR: No chest pain or palpitations  GASTROINTESTINAL: No abdominal or epigastric pain. No nausea, vomiting, or hematemesis; No diarrhea or constipation. No melena or hematochezia.  GENITOURINARY: No dysuria, frequency or hematuria  NEUROLOGICAL: No numbness or weakness  SKIN: No itching, burning, rashes, or lesions   All other review of systems is negative unless indicated above.    VITAL:  T(C): , Max: 36.9 (11-03-22 @ 20:41)  T(F): , Max: 98.4 (11-03-22 @ 20:41)  HR: 63 (11-04-22 @ 05:01)  BP: 120/65 (11-04-22 @ 04:49)  BP(mean): --  RR: 18 (11-04-22 @ 04:49)  SpO2: 98% (11-04-22 @ 05:01)  Wt(kg): --    I and O's:    11-01 @ 07:01  -  11-02 @ 07:00  --------------------------------------------------------  IN: 63 mL / OUT: 650 mL / NET: -587 mL    11-02 @ 07:01  -  11-03 @ 07:00  --------------------------------------------------------  IN: 930 mL / OUT: 0 mL / NET: 930 mL    11-03 @ 07:01  -  11-04 @ 05:48  --------------------------------------------------------  IN: 390 mL / OUT: 0 mL / NET: 390 mL          PHYSICAL EXAM:    Constitutional: NAD  HEENT: PERRLA,   Neck: No JVD  Respiratory: CTA B/L  Cardiovascular: S1 and S2  Gastrointestinal: BS+, soft, NT/ND  Extremities: No peripheral edema  Neurological: A/O x 3, no focal deficits  Psychiatric: Normal mood, normal affect  : No Junior  Skin: No rashes  Access: Not applicable  Back: No CVA tenderness    LABS:    11-03    139  |  95<L>  |  77<H>  ----------------------------<  129<H>  4.9   |  38<H>  |  2.36<H>    Ca    10.4      03 Nov 2022 16:00  Mg     2.6     11-03            RADIOLOGY & ADDITIONAL STUDIES:

## 2022-11-04 NOTE — PROGRESS NOTE ADULT - ASSESSMENT
72 year old Female with history of COPD, CHF presents with SOB. Nephrology consulted for elevated Scr.    1) MISAEL: likely hemodynamically mediated in setting of aflutter and HF. Scr improved but remains above baseline. Continue with supportive care. UA bland with hyaline casts. FeNa elevated however drawn on diuretics (not accurate). CT without hydronephrosis. Avoid nephrotoxins.    2) CKD-3b: Baseline Scr 1.4-1.6 with CKD likely due to DM. Outpatient CKD work up. Monitor electrolytes.    3) HTN with CKD: BP controlled. Rate control as per cardiology/EP. Monitor BP.    4) LE edema: Patient remains volume overloaded. Will give an additional bumex 1 mg IV X 1 dose today and start bumex 2 mg PO daily on 11/5. TTE with mild to moderate LVSD. Monitor UO.    5) Hypercalcemia: F/U iPTH. Further work up pending results. Monitor serum calcium.    6) Anemia of renal disease: Hb low. F/U AM iron stores. Continue with ferrous sulfate. Will consider CHACORTA pending results. Monitor Hb.    7) L renal mass: Not visualized on CT. Outpatient Urology evaluation.      [facility] NEPHROLOGY  Rodrigue Amador M.D.  Rob Perez D.O.  Ana Song M.D.  Lucrecia López, MSN, ANP-C    Telephone: [phone]  Facsimile: [phone]    [address] [place] [address]

## 2022-11-04 NOTE — PROGRESS NOTE ADULT - ASSESSMENT
73yo female with a PMH of COPD on home O2(3L) with severe pHTN, MVR/TVR , AF s/p ablation,  OHS/MIGUEL on home BiPAP, HTN, HLD who initially presented to the ED as a transfer from [facility] for AFL s/p Amiodarone initiation. EP has been re-consulted due to an episode of AFL in the 130's and due to concern for continued Amiodarone use given history of COPD and long term medication toxicity.    1. AFL/AF     - Keep NPO for ELIZABETH/DCCV today in the OR  - Discontinued Amiodarone given patient's FRAN not cleared, likely resume post  - Continue Metoprolol/Dilt w/ good rate control   - Nephrology following for MISAEL on CKD  - Patient endorsed history of GIB years ago however cannot remember details - cleared by GI for ELIZABETH/DCCV  - Will not pursue ablation at this time given positional dyspnea (cannot lay flat)   - Continue to dose Coumadin, INR 3.54 today  - Ensure active COVID PCR   - Close telemetry monitoring  - Consent for DCCV obtained and placed in chart     71yo female with a PMH of COPD on home O2(3L) with severe pHTN, MVR/TVR , AF s/p ablation,  OHS/MIGUEL on home BiPAP, HTN, HLD who initially presented to the ED as a transfer from [facility] for AFL s/p Amiodarone initiation. EP has been re-consulted due to an episode of AFL in the 130's and due to concern for continued Amiodarone use given history of COPD and long term medication toxicity.    1. AFL/AF     - Keep NPO for ELIZABETH/DCCV today in the OR  - Discontinued Amiodarone given patient's FRAN not cleared, likely resume post  - Continue Metoprolol/Dilt w/ good rate control   - Nephrology following for MISAEL on CKD  - Patient endorsed history of GIB years ago however cannot remember details - cleared by GI for ELIZABETH/DCCV  - Will not pursue ablation at this time given positional dyspnea (cannot lay flat)   - Continue to dose Coumadin, INR 3.54 today  - Ensure active COVID PCR   - Close telemetry monitoring  - Consent for DCCV obtained and placed in chart    Addendum:  - pt s/p ELIZABETH/DCCV with conversion to SR 60s  - Start Amiodarone 400 mg TID x 10 gm load, then 200 mg daily  - Continue Coumadin - dose will need to be adjusted while on amio load  - Spoke with team

## 2022-11-05 LAB
ANION GAP SERPL CALC-SCNC: 11 MMOL/L — SIGNIFICANT CHANGE UP (ref 5–17)
ANION GAP SERPL CALC-SCNC: 9 MMOL/L — SIGNIFICANT CHANGE UP (ref 5–17)
BUN SERPL-MCNC: 82 MG/DL — HIGH (ref 7–23)
BUN SERPL-MCNC: 85 MG/DL — HIGH (ref 7–23)
CALCIUM SERPL-MCNC: 10.2 MG/DL — SIGNIFICANT CHANGE UP (ref 8.4–10.5)
CALCIUM SERPL-MCNC: 9.5 MG/DL — SIGNIFICANT CHANGE UP (ref 8.4–10.5)
CHLORIDE SERPL-SCNC: 95 MMOL/L — LOW (ref 96–108)
CHLORIDE SERPL-SCNC: 97 MMOL/L — SIGNIFICANT CHANGE UP (ref 96–108)
CO2 SERPL-SCNC: 31 MMOL/L — SIGNIFICANT CHANGE UP (ref 22–31)
CO2 SERPL-SCNC: 35 MMOL/L — HIGH (ref 22–31)
CREAT SERPL-MCNC: 2.85 MG/DL — HIGH (ref 0.5–1.3)
CREAT SERPL-MCNC: 3.34 MG/DL — HIGH (ref 0.5–1.3)
EGFR: 14 ML/MIN/1.73M2 — LOW
EGFR: 17 ML/MIN/1.73M2 — LOW
GLUCOSE BLDC GLUCOMTR-MCNC: 129 MG/DL — HIGH (ref 70–99)
GLUCOSE BLDC GLUCOMTR-MCNC: 150 MG/DL — HIGH (ref 70–99)
GLUCOSE BLDC GLUCOMTR-MCNC: 154 MG/DL — HIGH (ref 70–99)
GLUCOSE BLDC GLUCOMTR-MCNC: 167 MG/DL — HIGH (ref 70–99)
GLUCOSE SERPL-MCNC: 125 MG/DL — HIGH (ref 70–99)
GLUCOSE SERPL-MCNC: 158 MG/DL — HIGH (ref 70–99)
HCT VFR BLD CALC: 34.2 % — LOW (ref 34.5–45)
HGB BLD-MCNC: 9.7 G/DL — LOW (ref 11.5–15.5)
INR BLD: 3.61 RATIO — HIGH (ref 0.88–1.16)
MCHC RBC-ENTMCNC: 28.4 GM/DL — LOW (ref 32–36)
MCHC RBC-ENTMCNC: 29 PG — SIGNIFICANT CHANGE UP (ref 27–34)
MCV RBC AUTO: 102.1 FL — HIGH (ref 80–100)
NRBC # BLD: 0 /100 WBCS — SIGNIFICANT CHANGE UP (ref 0–0)
PLATELET # BLD AUTO: 199 K/UL — SIGNIFICANT CHANGE UP (ref 150–400)
POTASSIUM SERPL-MCNC: 4.8 MMOL/L — SIGNIFICANT CHANGE UP (ref 3.5–5.3)
POTASSIUM SERPL-MCNC: 5.9 MMOL/L — HIGH (ref 3.5–5.3)
POTASSIUM SERPL-SCNC: 4.8 MMOL/L — SIGNIFICANT CHANGE UP (ref 3.5–5.3)
POTASSIUM SERPL-SCNC: 5.9 MMOL/L — HIGH (ref 3.5–5.3)
PROTHROM AB SERPL-ACNC: 42.4 SEC — HIGH (ref 10.5–13.4)
RBC # BLD: 3.35 M/UL — LOW (ref 3.8–5.2)
RBC # FLD: 15.5 % — HIGH (ref 10.3–14.5)
SODIUM SERPL-SCNC: 137 MMOL/L — SIGNIFICANT CHANGE UP (ref 135–145)
SODIUM SERPL-SCNC: 141 MMOL/L — SIGNIFICANT CHANGE UP (ref 135–145)
WBC # BLD: 6.13 K/UL — SIGNIFICANT CHANGE UP (ref 3.8–10.5)
WBC # FLD AUTO: 6.13 K/UL — SIGNIFICANT CHANGE UP (ref 3.8–10.5)

## 2022-11-05 PROCEDURE — 93010 ELECTROCARDIOGRAM REPORT: CPT

## 2022-11-05 PROCEDURE — 99233 SBSQ HOSP IP/OBS HIGH 50: CPT | Mod: GC

## 2022-11-05 RX ORDER — AMIODARONE HYDROCHLORIDE 400 MG/1
200 TABLET ORAL
Refills: 0 | Status: DISCONTINUED | OUTPATIENT
Start: 2022-11-05 | End: 2022-11-09

## 2022-11-05 RX ORDER — ACETAMINOPHEN 500 MG
1000 TABLET ORAL ONCE
Refills: 0 | Status: COMPLETED | OUTPATIENT
Start: 2022-11-05 | End: 2022-11-05

## 2022-11-05 RX ORDER — SODIUM ZIRCONIUM CYCLOSILICATE 10 G/10G
10 POWDER, FOR SUSPENSION ORAL ONCE
Refills: 0 | Status: COMPLETED | OUTPATIENT
Start: 2022-11-05 | End: 2022-11-05

## 2022-11-05 RX ORDER — TRAMADOL HYDROCHLORIDE 50 MG/1
25 TABLET ORAL ONCE
Refills: 0 | Status: DISCONTINUED | OUTPATIENT
Start: 2022-11-05 | End: 2022-11-05

## 2022-11-05 RX ORDER — SODIUM ZIRCONIUM CYCLOSILICATE 10 G/10G
10 POWDER, FOR SUSPENSION ORAL ONCE
Refills: 0 | Status: COMPLETED | OUTPATIENT
Start: 2022-11-05 | End: 2023-10-04

## 2022-11-05 RX ADMIN — SENNA PLUS 2 TABLET(S): 8.6 TABLET ORAL at 21:09

## 2022-11-05 RX ADMIN — BUMETANIDE 2 MILLIGRAM(S): 0.25 INJECTION INTRAMUSCULAR; INTRAVENOUS at 06:04

## 2022-11-05 RX ADMIN — Medication 400 MILLIGRAM(S): at 00:38

## 2022-11-05 RX ADMIN — INSULIN GLARGINE 12 UNIT(S): 100 INJECTION, SOLUTION SUBCUTANEOUS at 22:09

## 2022-11-05 RX ADMIN — Medication 2: at 13:12

## 2022-11-05 RX ADMIN — LIDOCAINE 1 PATCH: 4 CREAM TOPICAL at 13:11

## 2022-11-05 RX ADMIN — PANTOPRAZOLE SODIUM 40 MILLIGRAM(S): 20 TABLET, DELAYED RELEASE ORAL at 06:04

## 2022-11-05 RX ADMIN — SODIUM ZIRCONIUM CYCLOSILICATE 10 GRAM(S): 10 POWDER, FOR SUSPENSION ORAL at 15:14

## 2022-11-05 RX ADMIN — LIDOCAINE 1 PATCH: 4 CREAM TOPICAL at 19:55

## 2022-11-05 RX ADMIN — ISOSORBIDE MONONITRATE 30 MILLIGRAM(S): 60 TABLET, EXTENDED RELEASE ORAL at 13:13

## 2022-11-05 RX ADMIN — Medication 2: at 18:16

## 2022-11-05 RX ADMIN — Medication 1000 MILLIGRAM(S): at 01:15

## 2022-11-05 RX ADMIN — SIMVASTATIN 10 MILLIGRAM(S): 20 TABLET, FILM COATED ORAL at 21:10

## 2022-11-05 RX ADMIN — Medication 325 MILLIGRAM(S): at 13:15

## 2022-11-05 RX ADMIN — Medication 5 MILLIGRAM(S): at 06:04

## 2022-11-05 RX ADMIN — LIDOCAINE 1 PATCH: 4 CREAM TOPICAL at 03:35

## 2022-11-05 RX ADMIN — SODIUM ZIRCONIUM CYCLOSILICATE 10 GRAM(S): 10 POWDER, FOR SUSPENSION ORAL at 20:21

## 2022-11-05 NOTE — PROGRESS NOTE ADULT - SUBJECTIVE AND OBJECTIVE BOX
[facility] NEPHROLOGY- PROGRESS NOTE    72 year old Female with history of COPD, CHF presents with SOB. Nephrology consulted for elevated Scr.    For cardioversion today.    REVIEW OF SYSTEMS:  Gen: no fevers  Cards: no chest pain  Resp: + dyspnea with exertion improving  GI: no nausea or vomiting or diarrhea  Vascular: + LE edema improving    No Known Allergies      Hospital Medications: Medications reviewed    VITALS:  T(F): 98.1 (22 @ 11:52), Max: 98.1 (22 @ 11:52)  HR: 58 (22 @ 18:01)  BP: 124/74 (22 @ 11:52)  RR: 18 (22 @ 11:52)  SpO2: 96% (22 @ 18:01)  Wt(kg): --     @ 07:01  -   @ 07:00  --------------------------------------------------------  IN: 220 mL / OUT: 0 mL / NET: 220 mL     @ 08:01  -   @ 20:26  --------------------------------------------------------  IN: 180 mL / OUT: 0 mL / NET: 180 mL        PHYSICAL EXAM:  Gen: NAD, calm  Cards: Irregularly irregular, +S1/S2, no M/G/R  Resp: CTA B/L  GI: soft, NT/ND, NABS  Vascular: 1+ LE edema B/L      LABS:      137  |  95<L>  |  82<H>  ----------------------------<  158<H>  5.9<H>   |  31  |  2.85<H>    Ca    10.2      2022 11:27  Mg     2.4           Creatinine Trend: 2.85 <--, 2.30 <--, 2.36 <--, 2.31 <--, 2.20 <--, 2.46 <--, 2.73 <--                        9.7    6.13  )-----------( 199      ( 2022 11:27 )             34.2     Urine Studies:  Urinalysis Basic - ( 2022 03:01 )    Color: Light Yellow / Appearance: Clear / S.012 / pH:   Gluc:  / Ketone: Negative  / Bili: Negative / Urobili: Negative   Blood:  / Protein: Negative / Nitrite: Negative   Leuk Esterase: Negative / RBC: 0 /hpf / WBC 1 /HPF   Sq Epi:  / Non Sq Epi: 1 /hpf / Bacteria: Negative      Sodium, Random Urine: 86 mmol/L ( @ 03:02)  Creatinine, Random Urine: 51 mg/dL ( @ 03:02)

## 2022-11-05 NOTE — PROGRESS NOTE ADULT - ASSESSMENT
72 year old Female with history of COPD, CHF presents with SOB. Nephrology consulted for elevated Scr.    1) MISAEL: likely hemodynamically mediated in setting of aflutter and HF. Scr had been improving, but now worsened again.  Pt was PO bumetanide and got one IV dose 2/2 volume overload.  Continue for now.   Continue with supportive care. UA bland with hyaline casts. FeNa elevated however drawn on diuretics (not accurate). CT without hydronephrosis. Avoid nephrotoxins.    2) CKD-3b: Baseline Scr 1.4-1.6 with CKD likely due to DM. Outpatient CKD work up. Monitor electrolytes.    3) HTN with CKD: BP controlled. Rate control as per cardiology/EP. Monitor BP.    4) LE edema: Patient remains volume overloaded. S/p additional bumex 1 mg IV X 1 dose yesterday and Continue bumex 2 mg PO daily.   TTE with mild to moderate LVSD. Monitor UO.    5) Hypercalcemia: F/U iPTH. Further work up pending results. Monitor serum calcium.    6) Anemia of renal disease: Hb low. F/U AM iron stores. Continue with ferrous sulfate. Will consider CHACORTA pending results. Monitor Hb.    7) L renal mass: Not visualized on CT. Outpatient Urology evaluation.  That said, pt really is not a candidate for resection even if it were RCC.      [facility] NEPHROLOGY  Rodrigue Amador M.D.  Rob Perez D.O.  Ana Song M.D.  Lucrecia López, MSN, ANP-C    Telephone: [phone]  Facsimile: [phone]    [address] [place] [address]

## 2022-11-05 NOTE — PROGRESS NOTE ADULT - SUBJECTIVE AND OBJECTIVE BOX
24H hour events: No acute events    MEDICATIONS:  aMIOdarone    Tablet 200 milliGRAM(s) Oral two times a day  buMETAnide 2 milliGRAM(s) Oral daily  isosorbide   mononitrate ER Tablet (IMDUR) 30 milliGRAM(s) Oral daily  ALBUTerol    90 MICROgram(s) HFA Inhaler 2 Puff(s) Inhalation every 6 hours PRN  acetaminophen     Tablet .. 650 milliGRAM(s) Oral every 6 hours PRN  melatonin 3 milliGRAM(s) Oral at bedtime PRN  pantoprazole    Tablet 40 milliGRAM(s) Oral before breakfast  polyethylene glycol 3350 17 Gram(s) Oral daily  senna 2 Tablet(s) Oral at bedtime  insulin glargine Injectable (LANTUS) 12 Unit(s) SubCutaneous at bedtime  insulin lispro (ADMELOG) corrective regimen sliding scale   SubCutaneous at bedtime  insulin lispro (ADMELOG) corrective regimen sliding scale   SubCutaneous three times a day before meals  predniSONE   Tablet 5 milliGRAM(s) Oral daily  simvastatin 10 milliGRAM(s) Oral at bedtime  ferrous    sulfate 325 milliGRAM(s) Oral daily  lidocaine   4% Patch 1 Patch Transdermal daily      REVIEW OF SYSTEMS:  Complete 12 point ROS negative.    PHYSICAL EXAM:  T(C): 36.2 (11-05-22 @ 04:19), Max: 36.8 (11-04-22 @ 17:50)  HR: 40 (11-05-22 @ 05:35) (40 - 86)  BP: 128/60 (11-05-22 @ 04:30) (110/61 - 135/81)  RR: 18 (11-05-22 @ 04:19) (16 - 18)  SpO2: 95% (11-05-22 @ 05:35) (95% - 98%)  Wt(kg): --  I&O's Summary    04 Nov 2022 07:01  -  05 Nov 2022 07:00  --------------------------------------------------------  IN: 220 mL / OUT: 0 mL / NET: 220 mL        Appearance: Normal	  HEENT:  PERRL, EOMI	  Cardiovascular: Normal S1 S2, ofelia, No JVD, No murmurs, No edema  Respiratory: Lungs clear to auscultation	  Psychiatry: A & O x 3, Mood & affect appropriate  Gastrointestinal:  Soft, Non-tender, + BS	  Skin: No rashes, No ecchymoses, No cyanosis	  Neurologic: Non-focal  Extremities: No clubbing, cyanosis or edema  Vascular: Peripheral pulses palpable 2+ bilaterally        LABS:	 	    CBC Full  -  ( 04 Nov 2022 07:06 )  WBC Count : 5.81 K/uL  Hemoglobin : 9.7 g/dL  Hematocrit : 34.3 %  Platelet Count - Automated : 244 K/uL  Mean Cell Volume : 103.0 fl  Mean Cell Hemoglobin : 29.1 pg  Mean Cell Hemoglobin Concentration : 28.3 gm/dL  Auto Neutrophil # : x  Auto Lymphocyte # : x  Auto Monocyte # : x  Auto Eosinophil # : x  Auto Basophil # : x  Auto Neutrophil % : x  Auto Lymphocyte % : x  Auto Monocyte % : x  Auto Eosinophil % : x  Auto Basophil % : x    11-04    138  |  95<L>  |  78<H>  ----------------------------<  93  5.0   |  33<H>  |  2.30<H>  11-03    139  |  95<L>  |  77<H>  ----------------------------<  129<H>  4.9   |  38<H>  |  2.36<H>    Ca    10.3      04 Nov 2022 07:06  Ca    10.4      03 Nov 2022 16:00  Mg     2.4     11-04    TELEMETRY: Junctional 40-50s	      < from: TTE with Doppler (w/Cont) (10.23.22 @ 11:04) >  PROCEDURE: Transthoracic echocardiogram with 2-D, M-Mode  and complete spectral and colorflow Doppler. Verbal  consent was obtained for injection of  Ultrasonic Enhancing  Agent following a discussion of risks and benefits.  Following intravenous injection of Ultrasonic Enhancing  Agent, harmonic imaging was performed.  INDICATION: Abnormal electrocardiogram (ECG) (EKG) (R94.31)  ------------------------------------------------------------------------  CONCLUSIONS:  Technically very difficult study.  1. Mechanical prosthetic mitral valve replacement, which  was poorly visualized.  2.Endocardium not well visualized; grossly mild to  moderate global left ventricular systolic dysfunction.  Endocardial visualization enhanced with intravenous  injection of Ultrasonic Enhancing Agent (Definity).  3. The right ventricle is not well visualized.    < end of copied text >

## 2022-11-05 NOTE — PROGRESS NOTE ADULT - ASSESSMENT
71 y/o F with pmhx of COPD on home O2(3L) with severe pHTN, MVR/TVR , AF s/p ablation,  OHS/MIGUEL on home biPAP, HTN, HLD who presents with one week of vomiting with poor oral intake also complaining of SOB and chest pain.  She also states she has been taking prednisone on/off for the past 3 months for chest tightness and does endorse some weight gain.     In the ED pt found to have A Flutter and given Amio 400 po and Dig loaded.     #PULM  - Currently on home 3-4 L NC, No exp wheezing   - CXR likely fluid OL  - Continue proair inhalation  - c/w prednisone 5mg since on chronically for 3 months    AFLutter  - Amio loaded -EP f/u about dc amio  - holding digoxin   - EP following  - Continue Metoprolol    MVR/TV replacement off AC  - echo technically difficult, unable to assess due to habitus and positioning  - per chart review under last name Nancy MRN [id number] - mechanical valve replacement 1999, was previously on coumadin but stopped in 09/2021 after worsening anemia, concern for GI bleed & epistaxis.   - continue with ac  -? further eval of valve     ENDO  - A1c 10.7  - FS ~100-130  - Continue home Lantus and premeal, adjust as appropriate     73 y/o F with pmhx of COPD on home O2(3L) with severe pHTN, MVR/TVR , AF s/p ablation,  OHS/MIGUEL on home biPAP, HTN, HLD who presents with one week of vomiting with poor oral intake also complaining of SOB and chest pain.  She also states she has been taking prednisone on/off for the past 3 months for chest tightness and does endorse some weight gain.     In the ED pt found to have A Flutter and given Amio 400 po and Dig loaded.     #PULM  - Currently on home 3-4 L NC, No exp wheezing   - CXR likely fluid OL  - Continue proair inhalation  - c/w prednisone 5mg since on chronically for 3 months    AFLutter  -  s/p ELIZABETH/DCCV 11/4/22, currently junctional rhythm/ectopic atrial rhythm 40s - patient reports feeling ?weak  - Discontinue Metoprolol and Cardizem  - Adjust Amiodarone load to 200 mg BID for now  - If rates don't improve, may require PPM    MVR/TV replacement off AC  - echo technically difficult, unable to assess due to habitus and positioning  - per chart review under last name Nancy MRN [id number] - mechanical valve replacement 1999, was previously on coumadin but stopped in 09/2021 after worsening anemia, concern for GI bleed & epistaxis.   - continue with ac  -  ENDO  - A1c 10.7  - FS ~100-130  - Continue home Lantus and premeal, adjust as appropriate    Hyperkalemia- Kayexalate and f/u k

## 2022-11-05 NOTE — PROGRESS NOTE ADULT - SUBJECTIVE AND OBJECTIVE BOX
Patient is a 72y Female     Patient is a 72y old  Female who presents with a chief complaint of Vomiting (05 Nov 2022 09:42)      HPI:  71 y/o F with pmhx of COPD on home O2(3L) with severe pHTN, MVR/TVR , AF s/p ablation,  OHS/MIGUEL on home biPAP, HTN, HLD who presents with one week of vomiting with poor oral intake also complaining of SOB and chest pain.  She also states she has been taking prednisone on/off for the past 3 months for chest tightness and does endorse some weight gain.     In the ED pt found to have A Flutter and given Amio 400 po and Dig loaded.  (23 Oct 2022 04:23)      PAST MEDICAL & SURGICAL HISTORY:  Atrial fibrillation  S/P Ablation      Pulmonary hypertension      MIGUEL (obstructive sleep apnea)      COPD (chronic obstructive pulmonary disease)  on home O2      CHF (congestive heart failure)      HTN (hypertension)      Gout      Mitral valve replaced      Tricuspid valve replaced      CHF (congestive heart failure)      Hypertension      COPD (chronic obstructive pulmonary disease)      History of mitral valve replacement with mechanical valve      Tricuspid valve replaced  mechanical          MEDICATIONS  (STANDING):  aMIOdarone    Tablet 200 milliGRAM(s) Oral two times a day  buMETAnide 2 milliGRAM(s) Oral daily  ferrous    sulfate 325 milliGRAM(s) Oral daily  insulin glargine Injectable (LANTUS) 12 Unit(s) SubCutaneous at bedtime  insulin lispro (ADMELOG) corrective regimen sliding scale   SubCutaneous at bedtime  insulin lispro (ADMELOG) corrective regimen sliding scale   SubCutaneous three times a day before meals  isosorbide   mononitrate ER Tablet (IMDUR) 30 milliGRAM(s) Oral daily  lidocaine   4% Patch 1 Patch Transdermal daily  pantoprazole    Tablet 40 milliGRAM(s) Oral before breakfast  polyethylene glycol 3350 17 Gram(s) Oral daily  predniSONE   Tablet 5 milliGRAM(s) Oral daily  senna 2 Tablet(s) Oral at bedtime  simvastatin 10 milliGRAM(s) Oral at bedtime      Allergies    No Known Allergies    Intolerances        SOCIAL HISTORY:  Denies ETOh,Smoking,     FAMILY HISTORY:  Family history of hypertension (Father, Sibling)    Family history of stroke    Family history of hypertension (Mother)        REVIEW OF SYSTEMS:    CONSTITUTIONAL: No weakness, fevers or chills  EYES/ENT: No visual changes;  No vertigo or throat pain   NECK: No pain or stiffness  RESPIRATORY: No cough, wheezing, hemoptysis; No shortness of breath  CARDIOVASCULAR: No chest pain or palpitations  GASTROINTESTINAL: No abdominal or epigastric pain. No nausea, vomiting, or hematemesis; No diarrhea or constipation. No melena or hematochezia.  GENITOURINARY: No dysuria, frequency or hematuria  NEUROLOGICAL: No numbness or weakness  SKIN: No itching, burning, rashes, or lesions   All other review of systems is negative unless indicated above.    VITAL:  T(C): , Max: 36.8 (11-04-22 @ 17:50)  T(F): , Max: 98.2 (11-04-22 @ 17:50)  HR: 50 (11-05-22 @ 11:52)  BP: 124/74 (11-05-22 @ 11:52)  BP(mean): 79 (11-04-22 @ 17:28)  RR: 18 (11-05-22 @ 11:52)  SpO2: 100% (11-05-22 @ 11:52)  Wt(kg): --    I and O's:    11-03 @ 07:01  -  11-04 @ 07:00  --------------------------------------------------------  IN: 390 mL / OUT: 0 mL / NET: 390 mL    11-04 @ 07:01  -  11-05 @ 07:00  --------------------------------------------------------  IN: 220 mL / OUT: 0 mL / NET: 220 mL      Height (cm): 162.6 (11-04 @ 17:28)  Weight (kg): 112.5 (11-04 @ 17:28)  BMI (kg/m2): 42.6 (11-04 @ 17:28)  BSA (m2): 2.14 (11-04 @ 17:28)    PHYSICAL EXAM:    Constitutional: NAD  HEENT: PERRLA,   Neck: No JVD  Respiratory: CTA B/L  Cardiovascular: S1 and S2  Gastrointestinal: BS+, soft, NT/ND  Extremities: No peripheral edema  Neurological: A/O x 3, no focal deficits  Psychiatric: Normal mood, normal affect  : No Junior  Skin: No rashes  Access: Not applicable  Back: No CVA tenderness    LABS:                        9.7    6.13  )-----------( 199      ( 05 Nov 2022 11:27 )             34.2     11-05    137  |  95<L>  |  82<H>  ----------------------------<  158<H>  5.9<H>   |  31  |  2.85<H>    Ca    10.2      05 Nov 2022 11:27  Mg     2.4     11-04            RADIOLOGY & ADDITIONAL STUDIES:

## 2022-11-05 NOTE — PROGRESS NOTE ADULT - SUBJECTIVE AND OBJECTIVE BOX
SUBJECTIVE / OVERNIGHT EVENTS:pt seen and examined    MEDICATIONS  (STANDING):  aMIOdarone    Tablet 200 milliGRAM(s) Oral two times a day  buMETAnide 2 milliGRAM(s) Oral daily  ferrous    sulfate 325 milliGRAM(s) Oral daily  insulin glargine Injectable (LANTUS) 12 Unit(s) SubCutaneous at bedtime  insulin lispro (ADMELOG) corrective regimen sliding scale   SubCutaneous at bedtime  insulin lispro (ADMELOG) corrective regimen sliding scale   SubCutaneous three times a day before meals  isosorbide   mononitrate ER Tablet (IMDUR) 30 milliGRAM(s) Oral daily  lidocaine   4% Patch 1 Patch Transdermal daily  pantoprazole    Tablet 40 milliGRAM(s) Oral before breakfast  polyethylene glycol 3350 17 Gram(s) Oral daily  predniSONE   Tablet 5 milliGRAM(s) Oral daily  senna 2 Tablet(s) Oral at bedtime  simvastatin 10 milliGRAM(s) Oral at bedtime  sodium zirconium cyclosilicate 10 Gram(s) Oral once    MEDICATIONS  (PRN):  acetaminophen     Tablet .. 650 milliGRAM(s) Oral every 6 hours PRN Temp greater or equal to 38C (100.4F), Mild Pain (1 - 3)  ALBUTerol    90 MICROgram(s) HFA Inhaler 2 Puff(s) Inhalation every 6 hours PRN Bronchospasm  melatonin 3 milliGRAM(s) Oral at bedtime PRN Insomnia    Vital Signs Last 24 Hrs  T(C): 36.7 (22 @ 11:52), Max: 36.7 (22 @ 11:52)  T(F): 98.1 (22 @ 11:52), Max: 98.1 (22 @ 11:52)  HR: 58 (22 @ 18:01) (40 - 69)  BP: 124/74 (22 @ 11:52) (124/74 - 135/81)  BP(mean): --  RR: 18 (22 @ 11:52) (18 - 18)  SpO2: 96% (22 @ 18:01) (95% - 100%)            Skin: No rash.  Eyes: No recent vision problems or eye pain.  ENT: No congestion, ear pain, or sore throat.  Cardiovascular: No chest pain or palpation.  Respiratory: No cough, shortness of breath, congestion, or wheezing.  Gastrointestinal: No abdominal pain, nausea, vomiting, or diarrhea.  Genitourinary: No dysuria.  Musculoskeletal: No joint swelling.  Neurologic: No headache.    PHYSICAL EXAM:  GENERAL: NAD  EYES: EOMI, PERRLA  NECK: Supple, No JVD  CHEST/LUNG: dec breath sounds at bases  HEART:  S1 , S2 +  ABDOMEN: soft , bs+  EXTREMITIES:  edema+  NEUROLOGY:alert awake oriented    LABS:      137  |  95<L>  |  82<H>  ----------------------------<  158<H>  5.9<H>   |  31  |  2.85<H>    Ca    10.2      2022 11:27  Mg     2.4           Creatinine Trend: 2.85 <--, 2.30 <--, 2.36 <--, 2.31 <--, 2.20 <--, 2.46 <--, 2.73 <--, 3.01 <--                        9.7    6.13  )-----------( 199      ( 2022 11:27 )             34.2     Urine Studies:  Urinalysis Basic - ( 2022 03:01 )    Color: Light Yellow / Appearance: Clear / S.012 / pH:   Gluc:  / Ketone: Negative  / Bili: Negative / Urobili: Negative   Blood:  / Protein: Negative / Nitrite: Negative   Leuk Esterase: Negative / RBC: 0 /hpf / WBC 1 /HPF   Sq Epi:  / Non Sq Epi: 1 /hpf / Bacteria: Negative      Sodium, Random Urine: 86 mmol/L ( @ 03:02)  Creatinine, Random Urine: 51 mg/dL ( @ 03:02)            PT/INR - ( 2022 11:27 )   PT: 42.4 sec;   INR: 3.61 ratio

## 2022-11-05 NOTE — PROGRESS NOTE ADULT - ASSESSMENT
71 y/o F with pmhx of COPD on home O2(3L) with severe pHTN, MVR/TVR , AF s/p ablation,  OHS/MIGUEL on home biPAP, HTN, HLD who presents with one week of vomiting with poor oral intake also complaining of SOB and chest pain.  She also states she has been taking prednisone on/off for the past 3 months for chest tightness and does endorse some weight gain.     In the ED pt found to have A Flutter and given Amio 400 po and Dig loaded.     #PULM  - Currently on home 3-4 L NC, No exp wheezing   - CXR likely fluid OL  - Continue proair inhalation  - c/w prednisone 5mg since on chronically for 3 months    AFLutter  -  s/p ELIZABETH/DCCV 11/4/22, currently junctional rhythm/ectopic atrial rhythm 40s - patient reports feeling ?weak  - Discontinue Metoprolol and Cardizem  - Adjust Amiodarone load to 200 mg BID for now  - If rates don't improve, may require PPM    MVR/TV replacement off AC  - echo technically difficult, unable to assess due to habitus and positioning  - per chart review under last name Nancy MRN [id number] - mechanical valve replacement 1999, was previously on coumadin but stopped in 09/2021 after worsening anemia, concern for GI bleed & epistaxis.   - continue with ac  -  ENDO  - A1c 10.7  - FS ~100-130  - Continue home Lantus and premeal, adjust as appropriate    Hyperkalemia- Kayexalate and f/u k

## 2022-11-05 NOTE — PROGRESS NOTE ADULT - ASSESSMENT
Impression:  COPD on home O2 (3L)  pHTN  Mechanical MV 1999  TVR  AF status/post ablation    Admitted with vomiting, decreased PO intake, dyspnea, and chest pain  Found to be in atrial flutter s/p DCCV and on amio loading.     Recommendations:  Mechanical MV and AF –  warfarin. monitor INR daily.    AFlutter - s/p DCCV and amio loading. metop 50mg daily and diltiazem 120mg daily.  - now junctional escape rhythm at rate of 40-50s. HDS and asymptomatic.  - consider stopping BB and CCB.  - amio loading per EP team.  - on coumadin.    Leg edema  - chronic leg edema   - c/w home diuretics.  - compression stocking, leg raise at night. Impression:  COPD on home O2 (3L)  pHTN  Mechanical MV 1999  TVR  AF status/post ablation    Admitted with vomiting, decreased PO intake, dyspnea, and chest pain  Found to be in atrial flutter s/p DCCV and on amio loading.     Recommendations:  Mechanical MV and AF –  warfarin. monitor INR daily.    AFlutter - s/p DCCV and amio loading. metop 50mg daily and diltiazem 120mg daily.  - now junctional escape rhythm at rate of 40-50s. HDS and asymptomatic.  - stop BB and CCB.  - amio loading per EP team.  - on coumadin.    Leg edema  - chronic leg edema   - c/w home diuretics.  - compression stocking, leg raise at night.

## 2022-11-05 NOTE — PROGRESS NOTE ADULT - SUBJECTIVE AND OBJECTIVE BOX
PATIENT SEEN AND EXAMINED ON :- 11/5/22  DATE OF SERVICE:    11/5/22         Interim events noted,Labs ,Radiological studies and Cardiology tests reviewed .     HOSPITAL COURSE: HPI:  73 y/o F with pmhx of COPD on home O2(3L) with severe pHTN, MVR/TVR , AF s/p ablation,  OHS/MIGUEL on home biPAP, HTN, HLD who presents with one week of vomiting with poor oral intake also complaining of SOB and chest pain.  She also states she has been taking prednisone on/off for the past 3 months for chest tightness and does endorse some weight gain.     In the ED pt found to have A Flutter and given Amio 400 po and Dig loaded.  (23 Oct 2022 04:23)      INTERIM EVENTS:Patient seen at bedside ,interim events noted.      PMH -reviewed admission note, no change since admission  HEART FAILURE: Acute[ ]Chronic[ ] Systolic[ ] Diastolic[ ] Combined Systolic and Diastolic[ ]  CAD[ ] CABG[ ] PCI[ ]  DEVICES[ ] PPM[ ] ICD[ ] ILR[ ]  ATRIAL FIBRILLATION[ ] Paroxysmal[ ] Permanent[ ] CHADS2-[  ]  MISAEL[ ] CKD1[ ] CKD2[ ] CKD3[ ] CKD4[ ] ESRD[ ]  COPD[ ] HTN[ ]   DM[ ] Type1[ ] Type 2[ ]   CVA[ ] Paresis[ ]    AMBULATION: Assisted[ ] Cane/walker[ ] Independent[ ]    MEDICATIONS  (STANDING):  aMIOdarone    Tablet 200 milliGRAM(s) Oral two times a day  buMETAnide 2 milliGRAM(s) Oral daily  ferrous    sulfate 325 milliGRAM(s) Oral daily  insulin glargine Injectable (LANTUS) 12 Unit(s) SubCutaneous at bedtime  insulin lispro (ADMELOG) corrective regimen sliding scale   SubCutaneous at bedtime  insulin lispro (ADMELOG) corrective regimen sliding scale   SubCutaneous three times a day before meals  isosorbide   mononitrate ER Tablet (IMDUR) 30 milliGRAM(s) Oral daily  lidocaine   4% Patch 1 Patch Transdermal daily  pantoprazole    Tablet 40 milliGRAM(s) Oral before breakfast  polyethylene glycol 3350 17 Gram(s) Oral daily  predniSONE   Tablet 5 milliGRAM(s) Oral daily  senna 2 Tablet(s) Oral at bedtime  simvastatin 10 milliGRAM(s) Oral at bedtime    MEDICATIONS  (PRN):  acetaminophen     Tablet .. 650 milliGRAM(s) Oral every 6 hours PRN Temp greater or equal to 38C (100.4F), Mild Pain (1 - 3)  ALBUTerol    90 MICROgram(s) HFA Inhaler 2 Puff(s) Inhalation every 6 hours PRN Bronchospasm  melatonin 3 milliGRAM(s) Oral at bedtime PRN Insomnia            REVIEW OF SYSTEMS:  Constitutional: [ ] fever, [ ]weight loss,  [ ]fatigue [ ]weight gain  Eyes: [ ] visual changes  Respiratory: [ ]shortness of breath;  [ ] cough, [ ]wheezing, [ ]chills, [ ]hemoptysis  Cardiovascular: [ ] chest pain, [ ]palpitations, [ ]dizziness,  [ ]leg swelling[ ]orthopnea[ ]PND  Gastrointestinal: [ ] abdominal pain, [ ]nausea, [ ]vomiting,  [ ]diarrhea [ ]Constipation [ ]Melena  Genitourinary: [ ] dysuria, [ ] hematuria [ ]Junior  Neurologic: [ ] headaches [ ] tremors[ ]weakness [ ]Paralysis Right[ ] Left[ ]  Skin: [ ] itching, [ ]burning, [ ] rashes  Endocrine: [ ] heat or cold intolerance  Musculoskeletal: [ ] joint pain or swelling; [ ] muscle, back, or extremity pain  Psychiatric: [ ] depression, [ ]anxiety, [ ]mood swings, or [ ]difficulty sleeping  Hematologic: [ ] easy bruising, [ ] bleeding gums    [ ] All remaining systems negative except as per above.   [ ]Unable to obtain.  [x] No change in ROS since admission      Vital Signs Last 24 Hrs  T(C): 36.7 (05 Nov 2022 11:52), Max: 36.7 (05 Nov 2022 11:52)  T(F): 98.1 (05 Nov 2022 11:52), Max: 98.1 (05 Nov 2022 11:52)  HR: 58 (05 Nov 2022 18:01) (40 - 69)  BP: 124/74 (05 Nov 2022 11:52) (124/74 - 135/81)  BP(mean): --  RR: 18 (05 Nov 2022 11:52) (18 - 18)  SpO2: 96% (05 Nov 2022 18:01) (95% - 100%)    Parameters below as of 05 Nov 2022 11:52  Patient On (Oxygen Delivery Method): room air      I&O's Summary    04 Nov 2022 07:01  -  05 Nov 2022 07:00  --------------------------------------------------------  IN: 220 mL / OUT: 0 mL / NET: 220 mL    05 Nov 2022 08:01  -  05 Nov 2022 21:10  --------------------------------------------------------  IN: 180 mL / OUT: 0 mL / NET: 180 mL        PHYSICAL EXAM:  General: No acute distress BMI-  HEENT: EOMI, PERRL  Neck: Supple, [ ] JVD  Lungs: Equal air entry bilaterally; [ ] rales [ ] wheezing [ ] rhonchi  Heart: Regular rate and rhythm; [x ] murmur   2/6 [ x] systolic [ ] diastolic [ ] radiation[ ] rubs [ ]  gallops  Abdomen: Nontender, bowel sounds present  Extremities: No clubbing, cyanosis, [ ] edema [ ]Pulses  equal and intact  Nervous system:  Alert & Oriented X3, no focal deficits  Psychiatric: Normal affect  Skin: No rashes or lesions    LABS:  11-05    137  |  95<L>  |  82<H>  ----------------------------<  158<H>  5.9<H>   |  31  |  2.85<H>    Ca    10.2      05 Nov 2022 11:27  Mg     2.4     11-04      Creatinine Trend: 2.85<--, 2.30<--, 2.36<--, 2.31<--, 2.20<--, 2.46<--                        9.7    6.13  )-----------( 199      ( 05 Nov 2022 11:27 )             34.2     PT/INR - ( 05 Nov 2022 11:27 )   PT: 42.4 sec;   INR: 3.61 ratio

## 2022-11-05 NOTE — PROGRESS NOTE ADULT - ATTENDING COMMENTS
Ms. Alcantara reported feeling weak. Junctional bradycardia noted on tele. CCB and BB discontinued. Hyperkalemia treated with plan to recheck electrolytes this evening.

## 2022-11-05 NOTE — PROGRESS NOTE ADULT - ASSESSMENT
71yo female with a PMH of COPD on home O2(3L) with severe pHTN, MVR/TVR , AF s/p ablation,  OHS/MIGUEL on home BiPAP, HTN, HLD who initially presented to the ED as a transfer from [facility] for AFL s/p Amiodarone initiation. EP has been re-consulted due to an episode of AFL in the 130's and due to concern for continued Amiodarone use given history of COPD and long term medication toxicity.    1. AFL/AF     - s/p ELIZABETH/DCCV 11/4/22, currently junctional rhythm 40s - patient reports feeling ?weak  - Discontinue Metoprolol and Cardizem  - Adjust Amiodarone load to 200 mg BID for now  - If rates don't improve, may require PPM  - Nephrology following for MISAEL on CKD  - Will not pursue ablation at this time given positional dyspnea (cannot lay flat)   - Continue to dose Coumadin, INR today pending  - Close telemetry monitoring  - Discussed with team       73yo female with a PMH of COPD on home O2(3L) with severe pHTN, MVR/TVR , AF s/p ablation,  OHS/MIGUEL on home BiPAP, HTN, HLD who initially presented to the ED as a transfer from [facility] for AFL s/p Amiodarone initiation. EP has been re-consulted due to an episode of AFL in the 130's and due to concern for continued Amiodarone use given history of COPD and long term medication toxicity.    1. AFL/AF     - s/p ELIZABETH/DCCV 11/4/22, currently junctional rhythm/ectopic atrial rhythm 40s - patient reports feeling ?weak  - Discontinue Metoprolol and Cardizem  - Adjust Amiodarone load to 200 mg BID for now  - If rates don't improve, may require PPM  - NPO after MN Sunday if PPM required  - Maintain active COVID PCR and T&S  - Nephrology following for MISAEL on CKD  - Will not pursue ablation at this time given positional dyspnea (cannot lay flat)   - Continue to dose Coumadin, INR today pending  - Close telemetry monitoring  - Discussed with team

## 2022-11-05 NOTE — PROGRESS NOTE ADULT - SUBJECTIVE AND OBJECTIVE BOX
Patient is a 72y old  Female who presents with a chief complaint of Vomiting (2022 13:38)      SUBJECTIVE / OVERNIGHT EVENTS:  no acute events overnight.  on telemetry, patient ofelia down to 40s-50s overnight with junctional escape, patient remains asymptomatic and hemodynamically stable.  denies cp, palpitation, SOB.   sitting on bed.    MEDICATIONS  (STANDING):  aMIOdarone    Tablet 400  Oral   aMIOdarone    Tablet 400 milliGRAM(s) Oral every 8 hours  buMETAnide 2 milliGRAM(s) Oral daily  diltiazem    milliGRAM(s) Oral daily  ferrous    sulfate 325 milliGRAM(s) Oral daily  insulin glargine Injectable (LANTUS) 12 Unit(s) SubCutaneous at bedtime  insulin lispro (ADMELOG) corrective regimen sliding scale   SubCutaneous at bedtime  insulin lispro (ADMELOG) corrective regimen sliding scale   SubCutaneous three times a day before meals  isosorbide   mononitrate ER Tablet (IMDUR) 30 milliGRAM(s) Oral daily  lidocaine   4% Patch 1 Patch Transdermal daily  metoprolol succinate ER 50 milliGRAM(s) Oral daily  pantoprazole    Tablet 40 milliGRAM(s) Oral before breakfast  polyethylene glycol 3350 17 Gram(s) Oral daily  predniSONE   Tablet 5 milliGRAM(s) Oral daily  senna 2 Tablet(s) Oral at bedtime  simvastatin 10 milliGRAM(s) Oral at bedtime    MEDICATIONS  (PRN):  acetaminophen     Tablet .. 650 milliGRAM(s) Oral every 6 hours PRN Temp greater or equal to 38C (100.4F), Mild Pain (1 - 3)  ALBUTerol    90 MICROgram(s) HFA Inhaler 2 Puff(s) Inhalation every 6 hours PRN Bronchospasm  melatonin 3 milliGRAM(s) Oral at bedtime PRN Insomnia      T(C): 36.2 (22 @ 04:19), Max: 36.8 (22 @ 17:50)  HR: 40 (22 @ 05:35) (40 - 86)  BP: 128/60 (22 @ 04:30) (110/61 - 135/81)  RR: 18 (22 @ 04:19) (16 - 18)  SpO2: 95% (22 @ 05:35) (95% - 98%)  CAPILLARY BLOOD GLUCOSE      POCT Blood Glucose.: 160 mg/dL (2022 21:25)  POCT Blood Glucose.: 120 mg/dL (2022 19:41)  POCT Blood Glucose.: 181 mg/dL (2022 13:43)  POCT Blood Glucose.: 104 mg/dL (2022 12:33)  POCT Blood Glucose.: 105 mg/dL (2022 09:15)    I&O's Summary    2022 07:01  -  2022 07:00  --------------------------------------------------------  IN: 220 mL / OUT: 0 mL / NET: 220 mL        PHYSICAL EXAM:  Constitutional: NAD.  on NC  HEENT: AT/NC, EOMI, Supple neck;  Respiratory: no increase in WOB  Cardiovascular: RRR, S1, S2, 2+ distal pulses. no JVD, b/l LE edema.  Gastrointestinal: soft; NT/ND, +BS  Extremities: no cyanosis; non-tender to palpation, DP and Radial pulses intact.  Neurological: A&Ox 3;  Psychiatric: normal mood/affect.    LABS:                        9.7    5.81  )-----------( 244      ( 2022 07:06 )             34.3     11-04    138  |  95<L>  |  78<H>  ----------------------------<  93  5.0   |  33<H>  |  2.30<H>    Ca    10.3      2022 07:06  Mg     2.4     11-04      PT/INR - ( 2022 07:10 )   PT: 41.6 sec;   INR: 3.54 ratio               Urinalysis Basic - ( 2022 03:01 )    Color: Light Yellow / Appearance: Clear / S.012 / pH: x  Gluc: x / Ketone: Negative  / Bili: Negative / Urobili: Negative   Blood: x / Protein: Negative / Nitrite: Negative   Leuk Esterase: Negative / RBC: 0 /hpf / WBC 1 /HPF   Sq Epi: x / Non Sq Epi: 1 /hpf / Bacteria: Negative        RADIOLOGY & ADDITIONAL TESTS:    Imaging Personally Reviewed: RAISA    Consultant(s) Notes Reviewed:  NA    Care Discussed with Consultants/Other Providers: RAISA

## 2022-11-05 NOTE — CHART NOTE - NSCHARTNOTEFT_GEN_A_CORE
NP Medicine Episodic     CC: Notified by tele tech pt with possible NSR to Afib conversion  back and forth briefly, pt was NSR 60 post ablation today. HR noted 50s around 23:00 then sustaining high 40s on tele with brief episode of 38 the lowest. V.S. with stable SBP in 120s. 12 lead EKG done with Junctional rhythm noted with absent and inverted P-waves. Pt seen at bedside, denies any dizziness, pre syncopal episode, SOB, CP, palpitations.     HPI: 73yo female with a PMH of COPD on home O2(3L) with severe pHTN, MVR/TVR , AF s/p ablation,  OHS/MIGUEL on home BiPAP, HTN, HLD who initially presented to the ED as a transfer from [facility] for AFL s/p Amiodarone initiation. Pt with continued Afib w/ RVR now s/p Cardioversion with EP today continued on Toprol XL, Cardizem CD, and Amiodarone load.  Now with rhythm converting to junctional rhythm HR 40s, asymptomatic.       Vital Signs Last 24 Hrs  T(C): 36.3 (05 Nov 2022 00:21), Max: 36.8 (04 Nov 2022 17:50)  T(F): 97.4 (05 Nov 2022 00:21), Max: 98.2 (04 Nov 2022 17:50)  HR: 48 (05 Nov 2022 00:21) (48 - 86)  BP: 129/85 (05 Nov 2022 00:21) (110/61 - 134/64)  BP(mean): 79 (04 Nov 2022 17:28) (79 - 79)  RR: 18 (05 Nov 2022 00:21) (16 - 18)  SpO2: 98% (05 Nov 2022 00:21) (94% - 100%)    Parameters below as of 05 Nov 2022 00:21  Patient On (Oxygen Delivery Method): nasal cannula        Physical Exam:  General:  non-toxic,  NAD  Neurology: A&Ox3, normal speech, CN II-XII intact, nonfocal, MARMOLEJO x 4  Head:  Normocephalic, atraumatic  Respiratory: CTA B/L  CV: RRR, S1S2, no murmur  Abdominal: Soft, NT, ND no palpable mass  MSK: No edema, + peripheral pulses, FROM all 4 extremity    Labs:                          9.7    5.81  )-----------( 244      ( 04 Nov 2022 07:06 )             34.3     11-04    138  |  95<L>  |  78<H>  ----------------------------<  93  5.0   |  33<H>  |  2.30<H>    Ca    10.3      04 Nov 2022 07:06  Mg     2.4     11-04          A/P: 73yo female with a PMH of COPD on home O2(3L) with severe pHTN, MVR/TVR , AF s/p ablation,  OHS/MIGUEL on home BiPAP, HTN, HLD who initially presented to the ED as a transfer from [facility] for AFL s/p Amiodarone initiation. Pt with continued Afib w/ RVR now s/p Cardioversion with EP today continued on Toprol XL, Cardizem CD, and Amiodarone load.  Now with rhythm converting to junctional rhythm HR 40s, asymptomatic.     - 12 Lead EKG done with HR 41, junctional rhythm ( absent and inverted P-wave)          Follow up with Attending in AM. NP Medicine Episodic     CC: Notified by tele tech pt with possible NSR to Afib conversion  back and forth briefly, pt was NSR 60 post ablation today. HR noted 50s around 23:00 then sustaining high 40s on tele with brief episode of 38 the lowest. V.S. with stable SBP in 120s. 12 lead EKG done with Junctional rhythm noted with absent and inverted P-waves. Pt seen at bedside, denies any dizziness, pre syncopal episode, SOB, CP, palpitations.     HPI: 73yo female with a PMH of COPD on home O2(3L) with severe pHTN, MVR/TVR , AF s/p ablation,  OHS/MIGUEL on home BiPAP, HTN, HLD who initially presented to the ED as a transfer from [facility] for AFL s/p Amiodarone initiation. Pt with continued Afib w/ RVR now s/p Cardioversion with EP today continued on Toprol XL, Cardizem CD, and Amiodarone load.  Now with rhythm converting to junctional rhythm HR 40s, asymptomatic.       Vital Signs Last 24 Hrs  T(C): 36.3 (05 Nov 2022 00:21), Max: 36.8 (04 Nov 2022 17:50)  T(F): 97.4 (05 Nov 2022 00:21), Max: 98.2 (04 Nov 2022 17:50)  HR: 48 (05 Nov 2022 00:21) (48 - 86)  BP: 129/85 (05 Nov 2022 00:21) (110/61 - 134/64)  BP(mean): 79 (04 Nov 2022 17:28) (79 - 79)  RR: 18 (05 Nov 2022 00:21) (16 - 18)  SpO2: 98% (05 Nov 2022 00:21) (94% - 100%)    Parameters below as of 05 Nov 2022 00:21  Patient On (Oxygen Delivery Method): nasal cannula        Physical Exam:  General:  non-toxic,  NAD  Neurology: A&Ox3, normal speech, CN II-XII intact, nonfocal, MARMOLEJO x 4  Head:  Normocephalic, atraumatic  Respiratory: CTA B/L  CV: RRR, S1S2, no murmur  Abdominal: Soft, NT, ND no palpable mass  MSK: No edema, + peripheral pulses, FROM all 4 extremity    Labs:                          9.7    5.81  )-----------( 244      ( 04 Nov 2022 07:06 )             34.3     11-04    138  |  95<L>  |  78<H>  ----------------------------<  93  5.0   |  33<H>  |  2.30<H>    Ca    10.3      04 Nov 2022 07:06  Mg     2.4     11-04          A/P: 73yo female with a PMH of COPD on home O2(3L) with severe pHTN, MVR/TVR , AF s/p ablation,  OHS/MIGUEL on home BiPAP, HTN, HLD who initially presented to the ED as a transfer from [facility] for AFL s/p Amiodarone initiation. Pt with continued Afib w/ RVR now s/p Cardioversion with EP today continued on Toprol XL, Cardizem CD, and Amiodarone load.  Now with rhythm converting to junctional rhythm HR 40s, asymptomatic, SBP 120s.    - 12 Lead EKG done with HR 41, junctional rhythm ( absent and inverted P-wave)  -  Nocturnal  House Cardiology Dr. Radhames Biswas notified, informs to hold AV justino agents and informs may continue Amio load if HR      does not remain bradycardiac.   - continue tele monitoring   Follow up with Attending in AM.    Na Wyatt, CARIN   [id number] NP Medicine Episodic     CC: Notified by tele tech pt with possible NSR to Afib conversion  back and forth briefly, pt was NSR 60 post ablation today. HR noted 50s around 23:00 then sustaining high 40s on tele with brief episode of 38 the lowest. V.S. with stable SBP in 120s. 12 lead EKG done with Junctional rhythm noted with absent and inverted P-waves. Pt seen at bedside, denies any dizziness, pre syncopal episode, SOB, CP, palpitations.     HPI: 73yo female with a PMH of COPD on home O2(3L) with severe pHTN, MVR/TVR , AF s/p ablation,  OHS/MIGUEL on home BiPAP, HTN, HLD who initially presented to the ED as a transfer from [facility] for AFL s/p Amiodarone initiation. Pt with continued Afib w/ RVR now s/p Cardioversion with EP today continued on Toprol XL, Cardizem CD, and Amiodarone load.  Now with rhythm converting to junctional rhythm HR 40s, asymptomatic.       Vital Signs Last 24 Hrs  T(C): 36.3 (05 Nov 2022 00:21), Max: 36.8 (04 Nov 2022 17:50)  T(F): 97.4 (05 Nov 2022 00:21), Max: 98.2 (04 Nov 2022 17:50)  HR: 48 (05 Nov 2022 00:21) (48 - 86)  BP: 129/85 (05 Nov 2022 00:21) (110/61 - 134/64)  BP(mean): 79 (04 Nov 2022 17:28) (79 - 79)  RR: 18 (05 Nov 2022 00:21) (16 - 18)  SpO2: 98% (05 Nov 2022 00:21) (94% - 100%)    Parameters below as of 05 Nov 2022 00:21  Patient On (Oxygen Delivery Method): nasal cannula        Physical Exam:  General:  non-toxic,  NAD  Neurology: A&Ox3, normal speech, CN II-XII intact, nonfocal, MARMOLEJO x 4  Head:  Normocephalic, atraumatic  Respiratory: CTA B/L  CV: RRR, S1S2, no murmur  Abdominal: Soft, NT, ND no palpable mass  MSK: No edema, + peripheral pulses, FROM all 4 extremity    Labs:                          9.7    5.81  )-----------( 244      ( 04 Nov 2022 07:06 )             34.3     11-04    138  |  95<L>  |  78<H>  ----------------------------<  93  5.0   |  33<H>  |  2.30<H>    Ca    10.3      04 Nov 2022 07:06  Mg     2.4     11-04          A/P: 73yo female with a PMH of COPD on home O2(3L) with severe pHTN, MVR/TVR , AF s/p ablation,  OHS/MIGUEL on home BiPAP, HTN, HLD who initially presented to the ED as a transfer from [facility] for AFL s/p Amiodarone initiation. Pt with continued Afib w/ RVR now s/p Cardioversion with EP today continued on Toprol XL, Cardizem CD, and Amiodarone load.  Now with rhythm converting to junctional rhythm HR 40s, asymptomatic, SBP 120s.    - 12 Lead EKG done with HR 41, junctional rhythm ( absent and inverted P-wave)  -  Nocturnal  House Cardiology Dr. Radhames Biswas notified, informs to hold AV justino agents and informs may continue Amio load if HR      does not remain bradycardiac.   - continue tele monitoring   Follow up with Attending in AM.    Na Wyatt NP   [id number]    Addendum HR sustaining high 30s junctional rhythm with HR brief the lowest 33. Pt still asymptomatic, denies any dizziness, CP, palpitations, SOB.  Held AM doses of Amio load, Toprol XL, and Cardizem CD  R 2 pad and defibrillator at bedside ordered   Continue with tele monitor  F/u with EPS today    Na Wyatt NP   [id number]

## 2022-11-06 LAB
ANION GAP SERPL CALC-SCNC: 13 MMOL/L — SIGNIFICANT CHANGE UP (ref 5–17)
APTT BLD: 49.4 SEC — HIGH (ref 27.5–35.5)
BLD GP AB SCN SERPL QL: NEGATIVE — SIGNIFICANT CHANGE UP
BUN SERPL-MCNC: 87 MG/DL — HIGH (ref 7–23)
CALCIUM SERPL-MCNC: 9.9 MG/DL — SIGNIFICANT CHANGE UP (ref 8.4–10.5)
CHLORIDE SERPL-SCNC: 96 MMOL/L — SIGNIFICANT CHANGE UP (ref 96–108)
CO2 SERPL-SCNC: 31 MMOL/L — SIGNIFICANT CHANGE UP (ref 22–31)
CREAT SERPL-MCNC: 3.24 MG/DL — HIGH (ref 0.5–1.3)
EGFR: 14 ML/MIN/1.73M2 — LOW
GLUCOSE BLDC GLUCOMTR-MCNC: 118 MG/DL — HIGH (ref 70–99)
GLUCOSE BLDC GLUCOMTR-MCNC: 139 MG/DL — HIGH (ref 70–99)
GLUCOSE BLDC GLUCOMTR-MCNC: 160 MG/DL — HIGH (ref 70–99)
GLUCOSE BLDC GLUCOMTR-MCNC: 164 MG/DL — HIGH (ref 70–99)
GLUCOSE SERPL-MCNC: 92 MG/DL — SIGNIFICANT CHANGE UP (ref 70–99)
HCT VFR BLD CALC: 32.6 % — LOW (ref 34.5–45)
HGB BLD-MCNC: 9.3 G/DL — LOW (ref 11.5–15.5)
INR BLD: 3.22 RATIO — HIGH (ref 0.88–1.16)
MCHC RBC-ENTMCNC: 28.5 GM/DL — LOW (ref 32–36)
MCHC RBC-ENTMCNC: 29.2 PG — SIGNIFICANT CHANGE UP (ref 27–34)
MCV RBC AUTO: 102.2 FL — HIGH (ref 80–100)
NRBC # BLD: 0 /100 WBCS — SIGNIFICANT CHANGE UP (ref 0–0)
PLATELET # BLD AUTO: 200 K/UL — SIGNIFICANT CHANGE UP (ref 150–400)
POTASSIUM SERPL-MCNC: 4.6 MMOL/L — SIGNIFICANT CHANGE UP (ref 3.5–5.3)
POTASSIUM SERPL-SCNC: 4.6 MMOL/L — SIGNIFICANT CHANGE UP (ref 3.5–5.3)
PROTHROM AB SERPL-ACNC: 37.8 SEC — HIGH (ref 10.5–13.4)
RBC # BLD: 3.19 M/UL — LOW (ref 3.8–5.2)
RBC # FLD: 15.4 % — HIGH (ref 10.3–14.5)
RH IG SCN BLD-IMP: POSITIVE — SIGNIFICANT CHANGE UP
SARS-COV-2 RNA SPEC QL NAA+PROBE: SIGNIFICANT CHANGE UP
SODIUM SERPL-SCNC: 140 MMOL/L — SIGNIFICANT CHANGE UP (ref 135–145)
WBC # BLD: 5.34 K/UL — SIGNIFICANT CHANGE UP (ref 3.8–10.5)
WBC # FLD AUTO: 5.34 K/UL — SIGNIFICANT CHANGE UP (ref 3.8–10.5)

## 2022-11-06 PROCEDURE — 99232 SBSQ HOSP IP/OBS MODERATE 35: CPT

## 2022-11-06 RX ADMIN — ISOSORBIDE MONONITRATE 30 MILLIGRAM(S): 60 TABLET, EXTENDED RELEASE ORAL at 12:02

## 2022-11-06 RX ADMIN — SIMVASTATIN 10 MILLIGRAM(S): 20 TABLET, FILM COATED ORAL at 21:58

## 2022-11-06 RX ADMIN — POLYETHYLENE GLYCOL 3350 17 GRAM(S): 17 POWDER, FOR SOLUTION ORAL at 12:02

## 2022-11-06 RX ADMIN — AMIODARONE HYDROCHLORIDE 200 MILLIGRAM(S): 400 TABLET ORAL at 06:24

## 2022-11-06 RX ADMIN — Medication 2: at 14:22

## 2022-11-06 RX ADMIN — BUMETANIDE 2 MILLIGRAM(S): 0.25 INJECTION INTRAMUSCULAR; INTRAVENOUS at 06:24

## 2022-11-06 RX ADMIN — INSULIN GLARGINE 12 UNIT(S): 100 INJECTION, SOLUTION SUBCUTANEOUS at 22:03

## 2022-11-06 RX ADMIN — LIDOCAINE 1 PATCH: 4 CREAM TOPICAL at 02:58

## 2022-11-06 RX ADMIN — PANTOPRAZOLE SODIUM 40 MILLIGRAM(S): 20 TABLET, DELAYED RELEASE ORAL at 06:24

## 2022-11-06 RX ADMIN — Medication 5 MILLIGRAM(S): at 06:24

## 2022-11-06 RX ADMIN — Medication 325 MILLIGRAM(S): at 11:57

## 2022-11-06 RX ADMIN — LIDOCAINE 1 PATCH: 4 CREAM TOPICAL at 19:40

## 2022-11-06 RX ADMIN — LIDOCAINE 1 PATCH: 4 CREAM TOPICAL at 17:49

## 2022-11-06 NOTE — PROGRESS NOTE ADULT - SUBJECTIVE AND OBJECTIVE BOX
SUBJECTIVE / OVERNIGHT EVENTS:pt seen and examined    MEDICATIONS  (STANDING):  aMIOdarone    Tablet 200 milliGRAM(s) Oral two times a day  buMETAnide 2 milliGRAM(s) Oral daily  ferrous    sulfate 325 milliGRAM(s) Oral daily  insulin glargine Injectable (LANTUS) 12 Unit(s) SubCutaneous at bedtime  insulin lispro (ADMELOG) corrective regimen sliding scale   SubCutaneous at bedtime  insulin lispro (ADMELOG) corrective regimen sliding scale   SubCutaneous three times a day before meals  isosorbide   mononitrate ER Tablet (IMDUR) 30 milliGRAM(s) Oral daily  lidocaine   4% Patch 1 Patch Transdermal daily  pantoprazole    Tablet 40 milliGRAM(s) Oral before breakfast  polyethylene glycol 3350 17 Gram(s) Oral daily  predniSONE   Tablet 5 milliGRAM(s) Oral daily  senna 2 Tablet(s) Oral at bedtime  simvastatin 10 milliGRAM(s) Oral at bedtime    MEDICATIONS  (PRN):  acetaminophen     Tablet .. 650 milliGRAM(s) Oral every 6 hours PRN Temp greater or equal to 38C (100.4F), Mild Pain (1 - 3)  ALBUTerol    90 MICROgram(s) HFA Inhaler 2 Puff(s) Inhalation every 6 hours PRN Bronchospasm  melatonin 3 milliGRAM(s) Oral at bedtime PRN Insomnia    Vital Signs Last 24 Hrs  T(C): 36.2 (22 @ 20:49), Max: 37.2 (22 @ 11:50)  T(F): 97.2 (22 @ 20:49), Max: 99 (22 @ 11:50)  HR: 45 (22 @ 20:49) (45 - 65)  BP: 147/75 (22 @ 20:49) (121/81 - 147/75)  BP(mean): --  RR: 18 (22 @ 20:49) (18 - 19)  SpO2: 100% (22 @ 20:49) (93% - 100%)            Skin: No rash.  Eyes: No recent vision problems or eye pain.  ENT: No congestion, ear pain, or sore throat.  Cardiovascular: No chest pain or palpation.  Respiratory: No cough, shortness of breath, congestion, or wheezing.  Gastrointestinal: No abdominal pain, nausea, vomiting, or diarrhea.  Genitourinary: No dysuria.  Musculoskeletal: No joint swelling.  Neurologic: No headache.    PHYSICAL EXAM:  GENERAL: NAD  EYES: EOMI, PERRLA  NECK: Supple, No JVD  CHEST/LUNG: dec breath sounds at bases  HEART:  S1 , S2 +  ABDOMEN: soft , bs+  EXTREMITIES:  edema+  NEUROLOGY:alert awake oriented    LABS:      140  |  96  |  87<H>  ----------------------------<  92  4.6   |  31  |  3.24<H>    Ca    9.9      2022 07:09      Creatinine Trend: 3.24 <--, 3.34 <--, 2.85 <--, 2.30 <--, 2.36 <--, 2.31 <--, 2.20 <--, 2.46 <--                        9.3    5.34  )-----------( 200      ( 2022 07:11 )             32.6     Urine Studies:  Urinalysis Basic - ( 2022 03:01 )    Color: Light Yellow / Appearance: Clear / S.012 / pH:   Gluc:  / Ketone: Negative  / Bili: Negative / Urobili: Negative   Blood:  / Protein: Negative / Nitrite: Negative   Leuk Esterase: Negative / RBC: 0 /hpf / WBC 1 /HPF   Sq Epi:  / Non Sq Epi: 1 /hpf / Bacteria: Negative      Sodium, Random Urine: 86 mmol/L ( @ 03:02)  Creatinine, Random Urine: 51 mg/dL ( @ 03:02)            PT/INR - ( 2022 07:15 )   PT: 37.8 sec;   INR: 3.22 ratio         PTT - ( 2022 07:15 )  PTT:49.4 sec        PT/INR - ( 2022 11:27 )   PT: 42.4 sec;   INR: 3.61 ratio

## 2022-11-06 NOTE — PROGRESS NOTE ADULT - SUBJECTIVE AND OBJECTIVE BOX
Patient is a 72y Female     Patient is a 72y old  Female who presents with a chief complaint of Vomiting (06 Nov 2022 11:15)      HPI:  71 y/o F with pmhx of COPD on home O2(3L) with severe pHTN, MVR/TVR , AF s/p ablation,  OHS/MIGUEL on home biPAP, HTN, HLD who presents with one week of vomiting with poor oral intake also complaining of SOB and chest pain.  She also states she has been taking prednisone on/off for the past 3 months for chest tightness and does endorse some weight gain.     In the ED pt found to have A Flutter and given Amio 400 po and Dig loaded.  (23 Oct 2022 04:23)      PAST MEDICAL & SURGICAL HISTORY:  Atrial fibrillation  S/P Ablation      Pulmonary hypertension      MIGUEL (obstructive sleep apnea)      COPD (chronic obstructive pulmonary disease)  on home O2      CHF (congestive heart failure)      HTN (hypertension)      Gout      Mitral valve replaced      Tricuspid valve replaced      CHF (congestive heart failure)      Hypertension      COPD (chronic obstructive pulmonary disease)      History of mitral valve replacement with mechanical valve      Tricuspid valve replaced  mechanical          MEDICATIONS  (STANDING):  aMIOdarone    Tablet 200 milliGRAM(s) Oral two times a day  buMETAnide 2 milliGRAM(s) Oral daily  ferrous    sulfate 325 milliGRAM(s) Oral daily  insulin glargine Injectable (LANTUS) 12 Unit(s) SubCutaneous at bedtime  insulin lispro (ADMELOG) corrective regimen sliding scale   SubCutaneous at bedtime  insulin lispro (ADMELOG) corrective regimen sliding scale   SubCutaneous three times a day before meals  isosorbide   mononitrate ER Tablet (IMDUR) 30 milliGRAM(s) Oral daily  lidocaine   4% Patch 1 Patch Transdermal daily  pantoprazole    Tablet 40 milliGRAM(s) Oral before breakfast  polyethylene glycol 3350 17 Gram(s) Oral daily  predniSONE   Tablet 5 milliGRAM(s) Oral daily  senna 2 Tablet(s) Oral at bedtime  simvastatin 10 milliGRAM(s) Oral at bedtime      Allergies    No Known Allergies    Intolerances        SOCIAL HISTORY:  Denies ETOh,Smoking,     FAMILY HISTORY:  Family history of hypertension (Father, Sibling)    Family history of stroke    Family history of hypertension (Mother)        REVIEW OF SYSTEMS:    CONSTITUTIONAL: No weakness, fevers or chills  EYES/ENT: No visual changes;  No vertigo or throat pain   NECK: No pain or stiffness  RESPIRATORY: No cough, wheezing, hemoptysis; No shortness of breath  CARDIOVASCULAR: No chest pain or palpitations  GASTROINTESTINAL: No abdominal or epigastric pain. No nausea, vomiting, or hematemesis; No diarrhea or constipation. No melena or hematochezia.  GENITOURINARY: No dysuria, frequency or hematuria  NEUROLOGICAL: No numbness or weakness  SKIN: No itching, burning, rashes, or lesions   All other review of systems is negative unless indicated above.    VITAL:  T(C): , Max: 37.2 (11-06-22 @ 11:50)  T(F): , Max: 99 (11-06-22 @ 11:50)  HR: 60 (11-06-22 @ 11:50)  BP: 132/66 (11-06-22 @ 11:50)  BP(mean): --  RR: 19 (11-06-22 @ 11:50)  SpO2: 100% (11-06-22 @ 11:50)  Wt(kg): --    I and O's:    11-04 @ 07:01  -  11-05 @ 07:00  --------------------------------------------------------  IN: 220 mL / OUT: 0 mL / NET: 220 mL    11-05 @ 08:01  -  11-06 @ 07:00  --------------------------------------------------------  IN: 1040 mL / OUT: 0 mL / NET: 1040 mL    11-06 @ 07:01  -  11-06 @ 13:40  --------------------------------------------------------  IN: 120 mL / OUT: 0 mL / NET: 120 mL          PHYSICAL EXAM:    Constitutional: NAD  HEENT: PERRLA,   Neck: No JVD  Respiratory: CTA B/L  Cardiovascular: S1 and S2  Gastrointestinal: BS+, soft, NT/ND  Extremities: No peripheral edema  Neurological: A/O x 3, no focal deficits  Psychiatric: Normal mood, normal affect  : No Junior  Skin: No rashes  Access: Not applicable  Back: No CVA tenderness    LABS:                        9.3    5.34  )-----------( 200      ( 06 Nov 2022 07:11 )             32.6     11-06    140  |  96  |  87<H>  ----------------------------<  92  4.6   |  31  |  3.24<H>    Ca    9.9      06 Nov 2022 07:09            RADIOLOGY & ADDITIONAL STUDIES:

## 2022-11-06 NOTE — PROGRESS NOTE ADULT - ASSESSMENT
71 y/o F with pmhx of COPD on home O2(3L) with severe pHTN, MVR/TVR , AF s/p ablation,  OHS/MIGUEL on home biPAP, HTN, HLD who presents with one week of vomiting with poor oral intake also complaining of SOB and chest pain.  She also states she has been taking prednisone on/off for the past 3 months for chest tightness and does endorse some weight gain.     In the ED pt found to have A Flutter and given Amio 400 po and Dig loaded.     #PULM  - Currently on home 3-4 L NC, No exp wheezing   - CXR likely fluid OL  - Continue proair inhalation  - c/w prednisone 5mg since on chronically for 3 months    AFLutter  -  s/p ELIZABETH/DCCV 11/4/22, currently junctional rhythm/ectopic atrial rhythm 40s - patient reports feeling ?weak  - Discontinue Metoprolol and Cardizem  - Adjust Amiodarone load to 200 mg BID for now  - If rates don't improve, may require PPM    MVR/TV replacement off AC  - echo technically difficult, unable to assess due to habitus and positioning  - per chart review under last name Nancy MRN [id number] - mechanical valve replacement 1999, was previously on coumadin but stopped in 09/2021 after worsening anemia, concern for GI bleed & epistaxis.   - continue with ac  -  ENDO  - A1c 10.7  - FS ~100-130  - Continue home Lantus and premeal, adjust as appropriate    Hyperkalemia- Kayexalate and f/u k

## 2022-11-06 NOTE — PROGRESS NOTE ADULT - SUBJECTIVE AND OBJECTIVE BOX
Full note to follow. [facility] NEPHROLOGY- PROGRESS NOTE    72 year old Female with history of COPD, CHF presents with SOB. Nephrology consulted for elevated Scr.    For cardioversion today.    REVIEW OF SYSTEMS:  Gen: no fevers  Cards: no chest pain  Resp: + dyspnea with exertion improving  GI: no nausea or vomiting or diarrhea  Vascular: + LE edema improving    No Known Allergies      Hospital Medications: Medications reviewed    VITALS:  T(F): 97.2 (22 @ 20:49), Max: 99 (22 @ 11:50)  HR: 45 (22 @ 20:49)  BP: 147/75 (22 @ 20:49)  RR: 18 (22 @ 20:49)  SpO2: 100% (22 @ 20:49)  Wt(kg): --     @ 08:01  -   @ 07:00  --------------------------------------------------------  IN: 1040 mL / OUT: 0 mL / NET: 1040 mL     @ 07:01  -   @ 00:03  --------------------------------------------------------  IN: 360 mL / OUT: 0 mL / NET: 360 mL        PHYSICAL EXAM:  Gen: NAD, calm  Cards: Irregularly irregular, +S1/S2, no M/G/R  Resp: CTA B/L  GI: soft, NT/ND, NABS  Vascular: 1+ LE edema B/L      LABS:      140  |  96  |  87<H>  ----------------------------<  92  4.6   |  31  |  3.24<H>    Ca    9.9      2022 07:09      Creatinine Trend: 3.24 <--, 3.34 <--, 2.85 <--, 2.30 <--, 2.36 <--, 2.31 <--, 2.20 <--, 2.46 <--                        9.3    5.34  )-----------( 200      ( 2022 07:11 )             32.6     Urine Studies:  Urinalysis Basic - ( 2022 03:01 )    Color: Light Yellow / Appearance: Clear / S.012 / pH:   Gluc:  / Ketone: Negative  / Bili: Negative / Urobili: Negative   Blood:  / Protein: Negative / Nitrite: Negative   Leuk Esterase: Negative / RBC: 0 /hpf / WBC 1 /HPF   Sq Epi:  / Non Sq Epi: 1 /hpf / Bacteria: Negative      Sodium, Random Urine: 86 mmol/L ( @ 03:02)  Creatinine, Random Urine: 51 mg/dL ( @ 03:02)

## 2022-11-06 NOTE — PROGRESS NOTE ADULT - SUBJECTIVE AND OBJECTIVE BOX
PULMONARY PROGRESS NOTE    DAMARI GUERRERO  MRN-[id number]    Patient is a 72y old  Female who presents with a chief complaint of Vomiting (05 Nov 2022 20:25)      HPI:  -  -    ROS:   -    ACTIVE MEDICATION LIST:  MEDICATIONS  (STANDING):  aMIOdarone    Tablet 200 milliGRAM(s) Oral two times a day  buMETAnide 2 milliGRAM(s) Oral daily  ferrous    sulfate 325 milliGRAM(s) Oral daily  insulin glargine Injectable (LANTUS) 12 Unit(s) SubCutaneous at bedtime  insulin lispro (ADMELOG) corrective regimen sliding scale   SubCutaneous at bedtime  insulin lispro (ADMELOG) corrective regimen sliding scale   SubCutaneous three times a day before meals  isosorbide   mononitrate ER Tablet (IMDUR) 30 milliGRAM(s) Oral daily  lidocaine   4% Patch 1 Patch Transdermal daily  pantoprazole    Tablet 40 milliGRAM(s) Oral before breakfast  polyethylene glycol 3350 17 Gram(s) Oral daily  predniSONE   Tablet 5 milliGRAM(s) Oral daily  senna 2 Tablet(s) Oral at bedtime  simvastatin 10 milliGRAM(s) Oral at bedtime    MEDICATIONS  (PRN):  acetaminophen     Tablet .. 650 milliGRAM(s) Oral every 6 hours PRN Temp greater or equal to 38C (100.4F), Mild Pain (1 - 3)  ALBUTerol    90 MICROgram(s) HFA Inhaler 2 Puff(s) Inhalation every 6 hours PRN Bronchospasm  melatonin 3 milliGRAM(s) Oral at bedtime PRN Insomnia      EXAM:  Vital Signs Last 24 Hrs  T(C): 36.4 (06 Nov 2022 05:03), Max: 36.8 (05 Nov 2022 20:48)  T(F): 97.6 (06 Nov 2022 05:03), Max: 98.2 (05 Nov 2022 20:48)  HR: 60 (06 Nov 2022 09:42) (40 - 60)  BP: 121/81 (06 Nov 2022 05:03) (121/81 - 143/74)  BP(mean): --  RR: 18 (06 Nov 2022 05:03) (18 - 18)  SpO2: 95% (06 Nov 2022 09:42) (91% - 100%)    Parameters below as of 06 Nov 2022 05:03  Patient On (Oxygen Delivery Method): nasal cannula        GENERAL: The patient is awake and alert in no apparent distress.     LUNGS: Clear to auscultation without wheezing, rales or rhonchi; respirations unlabored    HEART: Regular rate and rhythm without murmur.                            9.3    5.34  )-----------( 200      ( 06 Nov 2022 07:11 )             32.6       11-06    140  |  96  |  87<H>  ----------------------------<  92  4.6   |  31  |  3.24<H>    Ca    9.9      06 Nov 2022 07:09      >>> <<<    PROBLEM LIST:  72y Female with HEALTH ISSUES - PROBLEM Dx:  Atrial flutter    Pulmonary hypertension    Acute on chronic combined systolic and diastolic congestive heart failure              RECS:        Please call with any questions.    Irma Eaton DO  [facility] Pulmonary/Sleep Medicine  [phone]   PULMONARY PROGRESS NOTE    DAMARI GUERRERO  MRN-[id number]    Patient is a 72y old  Female who presents with a chief complaint of Vomiting (05 Nov 2022 20:25)      HPI:  -seen earlier this morning on home 02  -reports some joint pain  ROS:   -    ACTIVE MEDICATION LIST:  MEDICATIONS  (STANDING):  aMIOdarone    Tablet 200 milliGRAM(s) Oral two times a day  buMETAnide 2 milliGRAM(s) Oral daily  ferrous    sulfate 325 milliGRAM(s) Oral daily  insulin glargine Injectable (LANTUS) 12 Unit(s) SubCutaneous at bedtime  insulin lispro (ADMELOG) corrective regimen sliding scale   SubCutaneous at bedtime  insulin lispro (ADMELOG) corrective regimen sliding scale   SubCutaneous three times a day before meals  isosorbide   mononitrate ER Tablet (IMDUR) 30 milliGRAM(s) Oral daily  lidocaine   4% Patch 1 Patch Transdermal daily  pantoprazole    Tablet 40 milliGRAM(s) Oral before breakfast  polyethylene glycol 3350 17 Gram(s) Oral daily  predniSONE   Tablet 5 milliGRAM(s) Oral daily  senna 2 Tablet(s) Oral at bedtime  simvastatin 10 milliGRAM(s) Oral at bedtime    MEDICATIONS  (PRN):  acetaminophen     Tablet .. 650 milliGRAM(s) Oral every 6 hours PRN Temp greater or equal to 38C (100.4F), Mild Pain (1 - 3)  ALBUTerol    90 MICROgram(s) HFA Inhaler 2 Puff(s) Inhalation every 6 hours PRN Bronchospasm  melatonin 3 milliGRAM(s) Oral at bedtime PRN Insomnia      EXAM:  Vital Signs Last 24 Hrs  T(C): 36.4 (06 Nov 2022 05:03), Max: 36.8 (05 Nov 2022 20:48)  T(F): 97.6 (06 Nov 2022 05:03), Max: 98.2 (05 Nov 2022 20:48)  HR: 60 (06 Nov 2022 09:42) (40 - 60)  BP: 121/81 (06 Nov 2022 05:03) (121/81 - 143/74)  BP(mean): --  RR: 18 (06 Nov 2022 05:03) (18 - 18)  SpO2: 95% (06 Nov 2022 09:42) (91% - 100%)    Parameters below as of 06 Nov 2022 05:03  Patient On (Oxygen Delivery Method): nasal cannula        GENERAL: The patient is awake and alert in no apparent distress.     LUNGS: Clear to auscultation without wheezing, rales or rhonchi; respirations unlabored                             9.3    5.34  )-----------( 200      ( 06 Nov 2022 07:11 )             32.6       11-06    140  |  96  |  87<H>  ----------------------------<  92  4.6   |  31  |  3.24<H>    Ca    9.9      06 Nov 2022 07:09   < from: Xray Chest 1 View-PORTABLE IMMEDIATE (Xray Chest 1 View-PORTABLE IMMEDIATE .) (10.23.22 @ 00:39) >  19 EXAM:  XR CHEST PORTABLE IMMED 1V                          PROCEDURE DATE:  10/23/2022          INTERPRETATION:  EXAMINATION: XR CHEST IMMEDIATE    CLINICAL INDICATION: Dyspnea    TECHNIQUE: Single frontal, portable view of the chest was obtained.    COMPARISON: Chest x-ray 9/6/2022, abdominal CT 10/22/2022    FINDINGS:  Status post median sternotomy.  The heart size is not well evaluated on this projection.  Redemonstrated bilateral hazy opacities similar to prior exam: Pulmonary  edema/interstitial/alveolar infiltrates.  No pneumothorax.    IMPRESSION:  Pulmonary edema/interstitial/alveolar infiltrates, similar to prior exam.    --- End of Report ---           KAZ DALE MD; Resident Radiologist  This document has been electronically signed.  SILVANA GALLARDO DO; Attending Radiologist  This document has been electronically signed. Oct 23 2022  8:27AM    < end of copied text >      PROBLEM LIST:  72y Female with HEALTH ISSUES - PROBLEM Dx:  Atrial flutter    Pulmonary hypertension    Acute on chronic combined systolic and diastolic congestive heart failure              RECS:  now s/p ELIZABETH/DCCV  on home 02   using her triology qhs  on prednisone 5mg- can consider stopping it outpatient once she has formal pulm eval    Please call with any questions.    Irma Eaton DO  [facility] Pulmonary/Sleep Medicine  [phone]

## 2022-11-06 NOTE — PROGRESS NOTE ADULT - ASSESSMENT
72 year old Female with history of COPD, CHF presents with SOB. Nephrology consulted for elevated Scr.    1) MISAEL: likely hemodynamically mediated in setting of aflutter and HF. Scr had been improving, but now worsened again.  Pt s/p one IV dose bumex 2/2 volume overload 11/5.  Continue PO bumex 2mg daily.   Continue with supportive care. UA bland with hyaline casts. FeNa elevated however drawn on diuretics (not accurate). CT without hydronephrosis. Avoid nephrotoxins.    2) CKD-3b: Baseline Scr 1.4-1.6 with CKD likely due to DM. Outpatient CKD work up. Monitor electrolytes.    3) HTN with CKD: BP controlled. Rate control as per cardiology/EP. Monitor BP.    4) LE edema: Patient remains volume overloaded. Continue bumex 2 mg PO daily.   TTE with mild to moderate LVSD. Monitor UO.    5) Hypercalcemia: F/U iPTH. Further work up pending results. Monitor serum calcium.    6) Anemia of renal disease: Hb low. F/U AM iron stores. Continue with ferrous sulfate. Will consider CHACORTA pending results. Monitor Hb.    7) L renal mass: Not visualized on CT. Outpatient Urology evaluation.  That said, pt really is not a candidate for resection even if it were RCC.      [facility] NEPHROLOGY  Rodrigue Amador M.D.  Rob Perez D.O.  Ana Song M.D.  Lucrecia López, ALLISON, ANP-C    Telephone: [phone]  Facsimile: [phone]    [address] [place] [address]

## 2022-11-07 LAB
ANION GAP SERPL CALC-SCNC: 9 MMOL/L — SIGNIFICANT CHANGE UP (ref 5–17)
APTT BLD: 45.1 SEC — HIGH (ref 27.5–35.5)
BUN SERPL-MCNC: 87 MG/DL — HIGH (ref 7–23)
CALCIUM SERPL-MCNC: 10.1 MG/DL — SIGNIFICANT CHANGE UP (ref 8.4–10.5)
CHLORIDE SERPL-SCNC: 96 MMOL/L — SIGNIFICANT CHANGE UP (ref 96–108)
CO2 SERPL-SCNC: 35 MMOL/L — HIGH (ref 22–31)
CREAT SERPL-MCNC: 2.83 MG/DL — HIGH (ref 0.5–1.3)
EGFR: 17 ML/MIN/1.73M2 — LOW
GLUCOSE BLDC GLUCOMTR-MCNC: 131 MG/DL — HIGH (ref 70–99)
GLUCOSE BLDC GLUCOMTR-MCNC: 137 MG/DL — HIGH (ref 70–99)
GLUCOSE BLDC GLUCOMTR-MCNC: 155 MG/DL — HIGH (ref 70–99)
GLUCOSE BLDC GLUCOMTR-MCNC: 98 MG/DL — SIGNIFICANT CHANGE UP (ref 70–99)
GLUCOSE SERPL-MCNC: 110 MG/DL — HIGH (ref 70–99)
HCT VFR BLD CALC: 33.9 % — LOW (ref 34.5–45)
HGB BLD-MCNC: 9.5 G/DL — LOW (ref 11.5–15.5)
INR BLD: 2.48 RATIO — HIGH (ref 0.88–1.16)
MCHC RBC-ENTMCNC: 28 GM/DL — LOW (ref 32–36)
MCHC RBC-ENTMCNC: 29 PG — SIGNIFICANT CHANGE UP (ref 27–34)
MCV RBC AUTO: 103.4 FL — HIGH (ref 80–100)
NRBC # BLD: 0 /100 WBCS — SIGNIFICANT CHANGE UP (ref 0–0)
PLATELET # BLD AUTO: 204 K/UL — SIGNIFICANT CHANGE UP (ref 150–400)
POTASSIUM SERPL-MCNC: 4.7 MMOL/L — SIGNIFICANT CHANGE UP (ref 3.5–5.3)
POTASSIUM SERPL-SCNC: 4.7 MMOL/L — SIGNIFICANT CHANGE UP (ref 3.5–5.3)
PROTHROM AB SERPL-ACNC: 29 SEC — HIGH (ref 10.5–13.4)
RBC # BLD: 3.28 M/UL — LOW (ref 3.8–5.2)
RBC # FLD: 15.2 % — HIGH (ref 10.3–14.5)
SODIUM SERPL-SCNC: 140 MMOL/L — SIGNIFICANT CHANGE UP (ref 135–145)
WBC # BLD: 5.95 K/UL — SIGNIFICANT CHANGE UP (ref 3.8–10.5)
WBC # FLD AUTO: 5.95 K/UL — SIGNIFICANT CHANGE UP (ref 3.8–10.5)

## 2022-11-07 PROCEDURE — 99232 SBSQ HOSP IP/OBS MODERATE 35: CPT | Mod: GC

## 2022-11-07 RX ORDER — IRON SUCROSE 20 MG/ML
200 INJECTION, SOLUTION INTRAVENOUS ONCE
Refills: 0 | Status: COMPLETED | OUTPATIENT
Start: 2022-11-07 | End: 2022-11-07

## 2022-11-07 RX ORDER — WARFARIN SODIUM 2.5 MG/1
5 TABLET ORAL ONCE
Refills: 0 | Status: COMPLETED | OUTPATIENT
Start: 2022-11-07 | End: 2022-11-07

## 2022-11-07 RX ADMIN — INSULIN GLARGINE 12 UNIT(S): 100 INJECTION, SOLUTION SUBCUTANEOUS at 22:17

## 2022-11-07 RX ADMIN — LIDOCAINE 1 PATCH: 4 CREAM TOPICAL at 20:14

## 2022-11-07 RX ADMIN — LIDOCAINE 1 PATCH: 4 CREAM TOPICAL at 18:22

## 2022-11-07 RX ADMIN — AMIODARONE HYDROCHLORIDE 200 MILLIGRAM(S): 400 TABLET ORAL at 05:26

## 2022-11-07 RX ADMIN — LIDOCAINE 1 PATCH: 4 CREAM TOPICAL at 05:30

## 2022-11-07 RX ADMIN — BUMETANIDE 2 MILLIGRAM(S): 0.25 INJECTION INTRAMUSCULAR; INTRAVENOUS at 05:27

## 2022-11-07 RX ADMIN — ISOSORBIDE MONONITRATE 30 MILLIGRAM(S): 60 TABLET, EXTENDED RELEASE ORAL at 13:01

## 2022-11-07 RX ADMIN — Medication 650 MILLIGRAM(S): at 23:00

## 2022-11-07 RX ADMIN — PANTOPRAZOLE SODIUM 40 MILLIGRAM(S): 20 TABLET, DELAYED RELEASE ORAL at 06:10

## 2022-11-07 RX ADMIN — WARFARIN SODIUM 5 MILLIGRAM(S): 2.5 TABLET ORAL at 21:54

## 2022-11-07 RX ADMIN — Medication 5 MILLIGRAM(S): at 05:27

## 2022-11-07 RX ADMIN — IRON SUCROSE 110 MILLIGRAM(S): 20 INJECTION, SOLUTION INTRAVENOUS at 13:02

## 2022-11-07 RX ADMIN — SIMVASTATIN 10 MILLIGRAM(S): 20 TABLET, FILM COATED ORAL at 21:53

## 2022-11-07 RX ADMIN — Medication 650 MILLIGRAM(S): at 22:18

## 2022-11-07 RX ADMIN — AMIODARONE HYDROCHLORIDE 200 MILLIGRAM(S): 400 TABLET ORAL at 18:23

## 2022-11-07 NOTE — PROGRESS NOTE ADULT - ASSESSMENT
72 year old Female with history of COPD, CHF presents with SOB. Nephrology consulted for elevated Scr.    1) MISAEL: likely hemodynamically mediated in setting of aflutter and HF with recurrent MISAEL post-cardioversion with bradycardia. Scr improving. Continue with supportive care. UA bland with hyaline casts. FeNa elevated however drawn on diuretics (not accurate). CT without hydronephrosis. Avoid nephrotoxins.    2) CKD-3b: Baseline Scr 1.4-1.6 with CKD likely due to DM. Outpatient CKD work up. Monitor electrolytes.    3) HTN with CKD: BP controlled. Bradycardia as per cardiology/EP. Monitor BP.    4) LE edema: Improving. Continue with bumex 2 mg PO daily. TTE with mild to moderate LVSD. Monitor UO.    5) Hypercalcemia: Secondary to primary HPT. Defer sensipar given resolution of hypercalcemia. Monitor serum calcium.    6) Anemia of renal disease: Hb low with iron deficiency. Will give venofer 200 mg IV dose 1/3 today. Monitor Hb.    7) L renal mass: Not visualized on CT. Outpatient Urology evaluation.      [facility] NEPHROLOGY  Rodrigue Amador M.D.  Rob Perez D.O.  Ana Song M.D.  Lucrecia López, MSN, ANP-C    Telephone: [phone]  Facsimile: [phone]    [address] [place] [address]

## 2022-11-07 NOTE — PROGRESS NOTE ADULT - ASSESSMENT
71 y/o F with pmhx of COPD on home O2(3L) with severe pHTN, MVR/TVR , AF s/p ablation,  OHS/MIGUEL on home biPAP, HTN, HLD who presents with one week of vomiting with poor oral intake also complaining of SOB and chest pain.  She also states she has been taking prednisone on/off for the past 3 months for chest tightness and does endorse some weight gain.     In the ED pt found to have A Flutter and given Amio 400 po and Dig loaded.     #PULM  - Currently on home 3-4 L NC, No exp wheezing   - CXR likely fluid OL  - Continue proair inhalation  - c/w prednisone 5mg since on chronically for 3 months    AFLutter  -  s/p ELIZABETH/DCCV 11/4/22, currently junctional rhythm/ectopic atrial rhythm 40s - patient reports feeling ?weak  - Discontinue Metoprolol and Cardizem  - Adjust Amiodarone load to 200 mg BID for now  - If rates don't improve, may require PPM    MVR/TV replacement off AC  - echo technically difficult, unable to assess due to habitus and positioning  - per chart review under last name Nancy MRN [id number] - mechanical valve replacement 1999, was previously on coumadin but stopped in 09/2021 after worsening anemia, concern for GI bleed & epistaxis.   - continue with ac  -  ENDO  - A1c 10.7  - FS ~100-130  - Continue home Lantus and premeal, adjust as appropriate    Hyperkalemia- Kayexalate and f/u k

## 2022-11-07 NOTE — PROGRESS NOTE ADULT - SUBJECTIVE AND OBJECTIVE BOX
Overnight Events: Telemetry with continued junctional rhythm vs Afib with HRs in the 50s. Over the weekend, pt did have lower HRs from 30-55, also in junctional rhythm.     Review Of Systems: No chest pain, chronic shortness of breath, no palpitations            Current Meds:  acetaminophen     Tablet .. 650 milliGRAM(s) Oral every 6 hours PRN  ALBUTerol    90 MICROgram(s) HFA Inhaler 2 Puff(s) Inhalation every 6 hours PRN  aMIOdarone    Tablet 200 milliGRAM(s) Oral two times a day  buMETAnide 2 milliGRAM(s) Oral daily  insulin glargine Injectable (LANTUS) 12 Unit(s) SubCutaneous at bedtime  insulin lispro (ADMELOG) corrective regimen sliding scale   SubCutaneous at bedtime  insulin lispro (ADMELOG) corrective regimen sliding scale   SubCutaneous three times a day before meals  iron sucrose IVPB 200 milliGRAM(s) IV Intermittent once  isosorbide   mononitrate ER Tablet (IMDUR) 30 milliGRAM(s) Oral daily  lidocaine   4% Patch 1 Patch Transdermal daily  melatonin 3 milliGRAM(s) Oral at bedtime PRN  pantoprazole    Tablet 40 milliGRAM(s) Oral before breakfast  polyethylene glycol 3350 17 Gram(s) Oral daily  predniSONE   Tablet 5 milliGRAM(s) Oral daily  senna 2 Tablet(s) Oral at bedtime  simvastatin 10 milliGRAM(s) Oral at bedtime      Vitals:  T(F): 97.4 (11-07), Max: 99 (11-06)  HR: 55 (11-07) (45 - 65)  BP: 156/63 (11-07) (132/66 - 156/63)  RR: 18 (11-07)  SpO2: 94% (11-07)  I&O's Summary    06 Nov 2022 07:01  -  07 Nov 2022 07:00  --------------------------------------------------------  IN: 920 mL / OUT: 0 mL / NET: 920 mL    07 Nov 2022 07:01  -  07 Nov 2022 10:42  --------------------------------------------------------  IN: 0 mL / OUT: 350 mL / NET: -350 mL        Physical Exam:    Appearance: Morbidly obese, NAD  HEENT: NC/AT   Cardiovascular: Normal S1 S2, bradycardic, No murmurs, pitting and non-pitting edema of the lower extremities   Respiratory: Poor respiratory effort  Psychiatry: Tearful   Gastrointestinal:  Soft, Non-tender	  Skin: No rashes, No ecchymoses, No cyanosis of exposed skin 	  Neurologic: Normal speech                             9.5    5.95  )-----------( 204      ( 07 Nov 2022 07:34 )             33.9     11-07    140  |  96  |  87<H>  ----------------------------<  110<H>  4.7   |  35<H>  |  2.83<H>    Ca    10.1      07 Nov 2022 07:36      PT/INR - ( 07 Nov 2022 07:36 )   PT: 29.0 sec;   INR: 2.48 ratio         PTT - ( 07 Nov 2022 07:36 )  PTT:45.1 sec

## 2022-11-07 NOTE — PROGRESS NOTE ADULT - ATTENDING COMMENTS
seen and agree  hopefully without dilt/metoprolol will be able to continue with amiodarone and avoid PPM

## 2022-11-07 NOTE — PROGRESS NOTE ADULT - SUBJECTIVE AND OBJECTIVE BOX
SUBJECTIVE / OVERNIGHT EVENTS:pt seen and examined    MEDICATIONS  (STANDING):  aMIOdarone    Tablet 200 milliGRAM(s) Oral two times a day  buMETAnide 2 milliGRAM(s) Oral daily  insulin glargine Injectable (LANTUS) 12 Unit(s) SubCutaneous at bedtime  insulin lispro (ADMELOG) corrective regimen sliding scale   SubCutaneous at bedtime  insulin lispro (ADMELOG) corrective regimen sliding scale   SubCutaneous three times a day before meals  isosorbide   mononitrate ER Tablet (IMDUR) 30 milliGRAM(s) Oral daily  lidocaine   4% Patch 1 Patch Transdermal daily  pantoprazole    Tablet 40 milliGRAM(s) Oral before breakfast  polyethylene glycol 3350 17 Gram(s) Oral daily  predniSONE   Tablet 5 milliGRAM(s) Oral daily  senna 2 Tablet(s) Oral at bedtime  simvastatin 10 milliGRAM(s) Oral at bedtime    MEDICATIONS  (PRN):  acetaminophen     Tablet .. 650 milliGRAM(s) Oral every 6 hours PRN Temp greater or equal to 38C (100.4F), Mild Pain (1 - 3)  ALBUTerol    90 MICROgram(s) HFA Inhaler 2 Puff(s) Inhalation every 6 hours PRN Bronchospasm  melatonin 3 milliGRAM(s) Oral at bedtime PRN Insomnia    Vital Signs Last 24 Hrs  T(C): 36.6 (22 @ 21:12), Max: 36.6 (22 @ 21:12)  T(F): 97.9 (22 @ 21:12), Max: 97.9 (22 @ 21:12)  HR: 54 (22 @ 21:40) (54 - 58)  BP: 139/79 (22 @ 21:40) (139/79 - 172/92)  BP(mean): --  RR: 22 (22 @ 21:12) (18 - 22)  SpO2: 98% (22 @ 21:40) (94% - 99%)        Skin: No rash.  Eyes: No recent vision problems or eye pain.  ENT: No congestion, ear pain, or sore throat.  Cardiovascular: No chest pain or palpation.  Respiratory: No cough, shortness of breath, congestion, or wheezing.  Gastrointestinal: No abdominal pain, nausea, vomiting, or diarrhea.  Genitourinary: No dysuria.  Musculoskeletal: No joint swelling.  Neurologic: No headache.    PHYSICAL EXAM:  GENERAL: NAD  EYES: EOMI, PERRLA  NECK: Supple, No JVD  CHEST/LUNG: dec breath sounds at bases  HEART:  S1 , S2 +  ABDOMEN: soft , bs+  EXTREMITIES:  edema+  NEUROLOGY:alert awake oriented    LABS:      140  |  96  |  87<H>  ----------------------------<  110<H>  4.7   |  35<H>  |  2.83<H>    Ca    10.1      2022 07:36      Creatinine Trend: 2.83 <--, 3.24 <--, 3.34 <--, 2.85 <--, 2.30 <--, 2.36 <--, 2.31 <--, 2.20 <--                        9.5    5.95  )-----------( 204      ( 2022 07:34 )             33.9     Urine Studies:  Urinalysis Basic - ( 2022 03:01 )    Color: Light Yellow / Appearance: Clear / S.012 / pH:   Gluc:  / Ketone: Negative  / Bili: Negative / Urobili: Negative   Blood:  / Protein: Negative / Nitrite: Negative   Leuk Esterase: Negative / RBC: 0 /hpf / WBC 1 /HPF   Sq Epi:  / Non Sq Epi: 1 /hpf / Bacteria: Negative      Sodium, Random Urine: 86 mmol/L ( @ 03:02)  Creatinine, Random Urine: 51 mg/dL ( @ 03:02)            PT/INR - ( 2022 07:36 )   PT: 29.0 sec;   INR: 2.48 ratio         PTT - ( 2022 07:36 )  PTT:45.1 sec

## 2022-11-07 NOTE — PROGRESS NOTE ADULT - SUBJECTIVE AND OBJECTIVE BOX
[facility] NEPHROLOGY- PROGRESS NOTE    72 year old Female with history of COPD, CHF presents with SOB. Nephrology consulted for elevated Scr.    REVIEW OF SYSTEMS:  Gen: no fevers  Cards: no chest pain  Resp: + dyspnea with exertion improving  GI: no nausea or vomiting or diarrhea  Vascular: + LE edema improving    No Known Allergies      Hospital Medications: Medications reviewed      VITALS:  T(F): 97.4 (22 @ 04:55), Max: 99 (22 @ 11:50)  HR: 55 (22 @ 05:48)  BP: 156/63 (22 @ 04:55)  RR: 18 (22 @ 04:55)  SpO2: 94% (22 @ 05:48)  Wt(kg): --     @ 07:01  -   @ 07:00  --------------------------------------------------------  IN: 920 mL / OUT: 0 mL / NET: 920 mL      PHYSICAL EXAM:    Gen: NAD, calm  Cards: Irregularly irregular, +S1/S2, no M/G/R  Resp: CTA B/L  GI: soft, NT/ND, NABS  Vascular: 1+ LE edema B/L      LABS:      140  |  96  |  87<H>  ----------------------------<  110<H>  4.7   |  35<H>  |  2.83<H>    Ca    10.1      2022 07:36      Creatinine Trend: 2.83 <--, 3.24 <--, 3.34 <--, 2.85 <--, 2.30 <--, 2.36 <--, 2.31 <--, 2.20 <--                        9.5    5.95  )-----------( 204      ( 2022 07:34 )             33.9     Urine Studies:  Urinalysis Basic - ( 2022 03:01 )    Color: Light Yellow / Appearance: Clear / S.012 / pH:   Gluc:  / Ketone: Negative  / Bili: Negative / Urobili: Negative   Blood:  / Protein: Negative / Nitrite: Negative   Leuk Esterase: Negative / RBC: 0 /hpf / WBC 1 /HPF   Sq Epi:  / Non Sq Epi: 1 /hpf / Bacteria: Negative      Sodium, Random Urine: 86 mmol/L ( @ 03:02)  Creatinine, Random Urine: 51 mg/dL ( @ 03:02)

## 2022-11-07 NOTE — PROGRESS NOTE ADULT - SUBJECTIVE AND OBJECTIVE BOX
Patient is a 72y Female     Patient is a 72y old  Female who presents with a chief complaint of Vomiting (07 Nov 2022 08:20)      HPI:  73 y/o F with pmhx of COPD on home O2(3L) with severe pHTN, MVR/TVR , AF s/p ablation,  OHS/MIGUEL on home biPAP, HTN, HLD who presents with one week of vomiting with poor oral intake also complaining of SOB and chest pain.  She also states she has been taking prednisone on/off for the past 3 months for chest tightness and does endorse some weight gain.     In the ED pt found to have A Flutter and given Amio 400 po and Dig loaded.  (23 Oct 2022 04:23)      PAST MEDICAL & SURGICAL HISTORY:  Atrial fibrillation  S/P Ablation      Pulmonary hypertension      MIGUEL (obstructive sleep apnea)      COPD (chronic obstructive pulmonary disease)  on home O2      CHF (congestive heart failure)      HTN (hypertension)      Gout      Mitral valve replaced      Tricuspid valve replaced      CHF (congestive heart failure)      Hypertension      COPD (chronic obstructive pulmonary disease)      History of mitral valve replacement with mechanical valve      Tricuspid valve replaced  mechanical          MEDICATIONS  (STANDING):  aMIOdarone    Tablet 200 milliGRAM(s) Oral two times a day  buMETAnide 2 milliGRAM(s) Oral daily  insulin glargine Injectable (LANTUS) 12 Unit(s) SubCutaneous at bedtime  insulin lispro (ADMELOG) corrective regimen sliding scale   SubCutaneous at bedtime  insulin lispro (ADMELOG) corrective regimen sliding scale   SubCutaneous three times a day before meals  iron sucrose IVPB 200 milliGRAM(s) IV Intermittent once  isosorbide   mononitrate ER Tablet (IMDUR) 30 milliGRAM(s) Oral daily  lidocaine   4% Patch 1 Patch Transdermal daily  pantoprazole    Tablet 40 milliGRAM(s) Oral before breakfast  polyethylene glycol 3350 17 Gram(s) Oral daily  predniSONE   Tablet 5 milliGRAM(s) Oral daily  senna 2 Tablet(s) Oral at bedtime  simvastatin 10 milliGRAM(s) Oral at bedtime      Allergies    No Known Allergies    Intolerances        SOCIAL HISTORY:  Denies ETOh,Smoking,     FAMILY HISTORY:  Family history of hypertension (Father, Sibling)    Family history of stroke    Family history of hypertension (Mother)        REVIEW OF SYSTEMS:    CONSTITUTIONAL: No weakness, fevers or chills  EYES/ENT: No visual changes;  No vertigo or throat pain   NECK: No pain or stiffness  RESPIRATORY: No cough, wheezing, hemoptysis; No shortness of breath  CARDIOVASCULAR: No chest pain or palpitations  GASTROINTESTINAL: No abdominal or epigastric pain. No nausea, vomiting, or hematemesis; No diarrhea or constipation. No melena or hematochezia.  GENITOURINARY: No dysuria, frequency or hematuria  NEUROLOGICAL: No numbness or weakness  SKIN: No itching, burning, rashes, or lesions   All other review of systems is negative unless indicated above.    VITAL:  T(C): , Max: 37.2 (11-06-22 @ 11:50)  T(F): , Max: 99 (11-06-22 @ 11:50)  HR: 55 (11-07-22 @ 05:48)  BP: 156/63 (11-07-22 @ 04:55)  BP(mean): --  RR: 18 (11-07-22 @ 04:55)  SpO2: 94% (11-07-22 @ 05:48)  Wt(kg): --    I and O's:    11-05 @ 08:01  -  11-06 @ 07:00  --------------------------------------------------------  IN: 1040 mL / OUT: 0 mL / NET: 1040 mL    11-06 @ 07:01  -  11-07 @ 07:00  --------------------------------------------------------  IN: 920 mL / OUT: 0 mL / NET: 920 mL          PHYSICAL EXAM:    Constitutional: NAD  HEENT: PERRLA,   Neck: No JVD  Respiratory: CTA B/L  Cardiovascular: S1 and S2  Gastrointestinal: BS+, soft, NT/ND  Extremities: No peripheral edema  Neurological: A/O x 3, no focal deficits  Psychiatric: Normal mood, normal affect  : No Junior  Skin: No rashes  Access: Not applicable  Back: No CVA tenderness    LABS:                        9.5    5.95  )-----------( 204      ( 07 Nov 2022 07:34 )             33.9     11-07    140  |  96  |  87<H>  ----------------------------<  110<H>  4.7   |  35<H>  |  2.83<H>    Ca    10.1      07 Nov 2022 07:36            RADIOLOGY & ADDITIONAL STUDIES:

## 2022-11-07 NOTE — PROGRESS NOTE ADULT - ASSESSMENT
73yo female with a PMH of COPD on home O2(3L) with severe pHTN, MVR/TVR , AF s/p ablation,  OHS/MIGUEL on home BiPAP, HTN, HLD who initially presented to the ED as a transfer from [facility] for AFL s/p Amiodarone initiation. EP was re-consulted due to an episode of AFL in the 130's and due to concern for continued Amiodarone use given history of COPD and long term medication toxicity.    Recommendations  - s/p ELIZABETH/DCCV 11/4/22, currently junctional rhythm/ectopic atrial rhythm 50s - patient reports chronic dyspnea but no light-headedness, chest pain, palpitations   - Currently on Amiodarone  200 mg BID and NPO  - Maintain active COVID PCR and T&S  - Nephrology following for MISAEL on CKD  - Will not pursue ablation at this time given positional dyspnea (cannot lay flat)   - Continue to dose Coumadin, INR today pending  - Close telemetry monitoring  - Further recommendations to follow    Belle Driscoll MD  Cardiology Fellow     Recommendations are preliminary until cosigned by attending            71yo female with a PMH of COPD on home O2(3L) with severe pHTN, MVR/TVR , AF s/p ablation,  OHS/MIGUEL on home BiPAP, HTN, HLD who initially presented to the ED as a transfer from [facility] for AFL s/p Amiodarone initiation. EP was re-consulted due to an episode of AFL in the 130's and due to concern for continued Amiodarone use given history of COPD and long term medication toxicity.    Recommendations  - s/p ELIZABETH/DCCV 11/4/22, currently junctional rhythm/ectopic atrial rhythm 50s - patient reports chronic dyspnea but no light-headedness, chest pain, palpitations   - Currently on Amiodarone  200 mg BID   - Nephrology following for MISAEL on CKD  - Will not pursue ablation at this time given positional dyspnea (cannot lay flat)   - Continue to dose Coumadin  - Close telemetry monitoring  - HRs improving, will monitor off of Diltiazem and Metoprolol; no plan for PPM at this time but may need one prior to discharge; EP will follow    Belle Driscoll MD  Cardiology Fellow     Recommendations are preliminary until cosigned by attending

## 2022-11-08 LAB
ANION GAP SERPL CALC-SCNC: 10 MMOL/L — SIGNIFICANT CHANGE UP (ref 5–17)
BUN SERPL-MCNC: 78 MG/DL — HIGH (ref 7–23)
CALCIUM SERPL-MCNC: 9.9 MG/DL — SIGNIFICANT CHANGE UP (ref 8.4–10.5)
CHLORIDE SERPL-SCNC: 97 MMOL/L — SIGNIFICANT CHANGE UP (ref 96–108)
CO2 SERPL-SCNC: 35 MMOL/L — HIGH (ref 22–31)
CREAT SERPL-MCNC: 2.47 MG/DL — HIGH (ref 0.5–1.3)
EGFR: 20 ML/MIN/1.73M2 — LOW
GLUCOSE BLDC GLUCOMTR-MCNC: 119 MG/DL — HIGH (ref 70–99)
GLUCOSE BLDC GLUCOMTR-MCNC: 128 MG/DL — HIGH (ref 70–99)
GLUCOSE BLDC GLUCOMTR-MCNC: 129 MG/DL — HIGH (ref 70–99)
GLUCOSE BLDC GLUCOMTR-MCNC: 167 MG/DL — HIGH (ref 70–99)
GLUCOSE SERPL-MCNC: 102 MG/DL — HIGH (ref 70–99)
INR BLD: 2.08 RATIO — HIGH (ref 0.88–1.16)
POTASSIUM SERPL-MCNC: 4.3 MMOL/L — SIGNIFICANT CHANGE UP (ref 3.5–5.3)
POTASSIUM SERPL-SCNC: 4.3 MMOL/L — SIGNIFICANT CHANGE UP (ref 3.5–5.3)
PROTHROM AB SERPL-ACNC: 24.3 SEC — HIGH (ref 10.5–13.4)
SODIUM SERPL-SCNC: 142 MMOL/L — SIGNIFICANT CHANGE UP (ref 135–145)

## 2022-11-08 PROCEDURE — 99232 SBSQ HOSP IP/OBS MODERATE 35: CPT | Mod: GC

## 2022-11-08 RX ORDER — WARFARIN SODIUM 2.5 MG/1
5 TABLET ORAL ONCE
Refills: 0 | Status: COMPLETED | OUTPATIENT
Start: 2022-11-08 | End: 2022-11-08

## 2022-11-08 RX ORDER — IRON SUCROSE 20 MG/ML
200 INJECTION, SOLUTION INTRAVENOUS ONCE
Refills: 0 | Status: COMPLETED | OUTPATIENT
Start: 2022-11-08 | End: 2022-11-08

## 2022-11-08 RX ADMIN — IRON SUCROSE 110 MILLIGRAM(S): 20 INJECTION, SOLUTION INTRAVENOUS at 12:30

## 2022-11-08 RX ADMIN — LIDOCAINE 1 PATCH: 4 CREAM TOPICAL at 23:43

## 2022-11-08 RX ADMIN — LIDOCAINE 1 PATCH: 4 CREAM TOPICAL at 05:24

## 2022-11-08 RX ADMIN — Medication 5 MILLIGRAM(S): at 05:21

## 2022-11-08 RX ADMIN — AMIODARONE HYDROCHLORIDE 200 MILLIGRAM(S): 400 TABLET ORAL at 05:21

## 2022-11-08 RX ADMIN — Medication 650 MILLIGRAM(S): at 12:30

## 2022-11-08 RX ADMIN — PANTOPRAZOLE SODIUM 40 MILLIGRAM(S): 20 TABLET, DELAYED RELEASE ORAL at 05:21

## 2022-11-08 RX ADMIN — AMIODARONE HYDROCHLORIDE 200 MILLIGRAM(S): 400 TABLET ORAL at 17:24

## 2022-11-08 RX ADMIN — WARFARIN SODIUM 5 MILLIGRAM(S): 2.5 TABLET ORAL at 22:40

## 2022-11-08 RX ADMIN — Medication 650 MILLIGRAM(S): at 12:19

## 2022-11-08 RX ADMIN — SIMVASTATIN 10 MILLIGRAM(S): 20 TABLET, FILM COATED ORAL at 22:40

## 2022-11-08 RX ADMIN — BUMETANIDE 2 MILLIGRAM(S): 0.25 INJECTION INTRAMUSCULAR; INTRAVENOUS at 05:21

## 2022-11-08 RX ADMIN — LIDOCAINE 1 PATCH: 4 CREAM TOPICAL at 19:28

## 2022-11-08 RX ADMIN — INSULIN GLARGINE 12 UNIT(S): 100 INJECTION, SOLUTION SUBCUTANEOUS at 22:41

## 2022-11-08 NOTE — PROGRESS NOTE ADULT - ASSESSMENT
71 y/o F with pmhx of COPD on home O2(3L) with severe pHTN, MVR/TVR , AF s/p ablation,  OHS/MIGUEL on home biPAP, HTN, HLD who presents with one week of vomiting with poor oral intake also complaining of SOB and chest pain.  She also states she has been taking prednisone on/off for the past 3 months for chest tightness and does endorse some weight gain.     In the ED pt found to have A Flutter and given Amio 400 po and Dig loaded.     #PULM  - Currently on home 3-4 L NC, No exp wheezing   - CXR likely fluid OL  - Continue proair inhalation  - c/w prednisone 5mg since on chronically for 3 months    AFLutter  -  s/p ELIZABETH/DCCV 11/4/22, currently junctional rhythm/ectopic atrial rhythm 40s - patient reports feeling ?weak  - Discontinue Metoprolol and Cardizem  - Adjust Amiodarone load to 200 mg BID for now  - If rates don't improve, may require PPM    MVR/TV replacement off AC  - echo technically difficult, unable to assess due to habitus and positioning  - per chart review under last name Nancy MRN [id number] - mechanical valve replacement 1999, was previously on coumadin but stopped in 09/2021 after worsening anemia, concern for GI bleed & epistaxis.   - continue with ac  -  ENDO  - A1c 10.7  - FS ~100-130  - Continue home Lantus and premeal, adjust as appropriate    Hyperkalemia- Kayexalate and f/u k

## 2022-11-08 NOTE — PROGRESS NOTE ADULT - ASSESSMENT
71yo female with a PMH of COPD on home O2(3L) with severe pHTN, MVR/TVR , AF s/p ablation,  OHS/MIGUEL on home BiPAP, HTN, HLD who initially presented to the ED as a transfer from [facility] for AFL s/p Amiodarone initiation. EP was re-consulted due to an episode of AFL in the 130's and due to concern for continued Amiodarone use given history of COPD and long term medication toxicity.    Recommendations  - s/p ELIZABETH/DCCV 11/4/22, currently junctional rhythm/ectopic atrial rhythm 50s - patient reports chronic dyspnea but no light-headedness, chest pain, palpitations   - Currently on Amiodarone  200 mg BID   - Nephrology following for MISAEL on CKD  - Will not pursue ablation at this time given positional dyspnea (cannot lay flat)   - Continue to dose Coumadin  - Close telemetry monitoring  - HRs improving, will monitor off of Diltiazem and Metoprolol; no plan for PPM at this time but may need one prior to discharge; EP will follow    Belle Driscoll MD  Cardiology Fellow     Recommendations are preliminary until cosigned by attending  73yo female with a PMH of COPD on home O2(3L) with severe pHTN, MVR/TVR , AF s/p ablation,  OHS/MIGUEL on home BiPAP, HTN, HLD who initially presented to the ED as a transfer from [facility] for AFL s/p Amiodarone initiation. EP was re-consulted due to an episode of AFL in the 130's and due to concern for continued Amiodarone use given history of COPD and long term medication toxicity.    Recommendations  - s/p ELIZABETH/DCCV 11/4/22, currently junctional rhythm/ectopic atrial rhythm 50s - patient reports chronic dyspnea but no light-headedness, chest pain, palpitations   - Currently on Amiodarone  200 mg BID, will need close F/U of pulmonary status but okay to continue with underlying COPD    - Nephrology following for MISAEL on CKD  - Will not pursue ablation at this time given positional dyspnea (cannot lay flat)   - Continue to dose Coumadin  - Close telemetry monitoring  - HRs improving, will monitor off of Diltiazem and Metoprolol; no plan for PPM at this time but may need one prior to discharge; EP will follow    Belle Driscoll MD  Cardiology Fellow     Recommendations are preliminary until cosigned by attending

## 2022-11-08 NOTE — PROGRESS NOTE ADULT - SUBJECTIVE AND OBJECTIVE BOX
Overnight Events: Telemetry with improved HRs, junctional rhythm, HR 50-60, lowest HR ~45 while asleep    Review Of Systems: No chest pain, shortness of breath, or palpitations            Current Meds:  acetaminophen     Tablet .. 650 milliGRAM(s) Oral every 6 hours PRN  ALBUTerol    90 MICROgram(s) HFA Inhaler 2 Puff(s) Inhalation every 6 hours PRN  aMIOdarone    Tablet 200 milliGRAM(s) Oral two times a day  buMETAnide 2 milliGRAM(s) Oral daily  insulin glargine Injectable (LANTUS) 12 Unit(s) SubCutaneous at bedtime  insulin lispro (ADMELOG) corrective regimen sliding scale   SubCutaneous at bedtime  insulin lispro (ADMELOG) corrective regimen sliding scale   SubCutaneous three times a day before meals  isosorbide   mononitrate ER Tablet (IMDUR) 30 milliGRAM(s) Oral daily  lidocaine   4% Patch 1 Patch Transdermal daily  melatonin 3 milliGRAM(s) Oral at bedtime PRN  pantoprazole    Tablet 40 milliGRAM(s) Oral before breakfast  polyethylene glycol 3350 17 Gram(s) Oral daily  predniSONE   Tablet 5 milliGRAM(s) Oral daily  senna 2 Tablet(s) Oral at bedtime  simvastatin 10 milliGRAM(s) Oral at bedtime      Vitals:  T(F): 97.3 (11-08), Max: 97.9 (11-07)  HR: 54 (11-08) (54 - 58)  BP: 148/72 (11-08) (139/79 - 172/92)  RR: 18 (11-08)  SpO2: 98% (11-08)  I&O's Summary    07 Nov 2022 07:01  -  08 Nov 2022 07:00  --------------------------------------------------------  IN: 1110 mL / OUT: 1550 mL / NET: -440 mL        Physical Exam:    Appearance: Morbidly obese, NAD  HEENT: NC/AT   Cardiovascular: Normal S1 S2, RRR, No murmurs, pitting and non-pitting edema of the lower extremities   Respiratory: CTAB   Psychiatry: Normal mood and affect  Gastrointestinal:  Soft, Non-tender	  Skin: No rashes, No ecchymoses, No cyanosis of exposed skin 	  Neurologic: Normal speech                           9.5    5.95  )-----------( 204      ( 07 Nov 2022 07:34 )             33.9     11-08    142  |  97  |  78<H>  ----------------------------<  102<H>  4.3   |  35<H>  |  2.47<H>    Ca    9.9      08 Nov 2022 07:30      PT/INR - ( 08 Nov 2022 06:45 )   PT: 24.3 sec;   INR: 2.08 ratio         PTT - ( 07 Nov 2022 07:36 )  PTT:45.1 sec

## 2022-11-08 NOTE — PROGRESS NOTE ADULT - SUBJECTIVE AND OBJECTIVE BOX
PATIENT SEEN AND EXAMINED ON :- 11/8/2022  DATE OF SERVICE: 11/8/2022            Interim events noted,Labs ,Radiological studies and Cardiology tests reviewed .       HOSPITAL COURSE: HPI:  73 y/o F with pmhx of COPD on home O2(3L) with severe pHTN, MVR/TVR , AF s/p ablation,  OHS/MIGUEL on home biPAP, HTN, HLD who presents with one week of vomiting with poor oral intake also complaining of SOB and chest pain.  She also states she has been taking prednisone on/off for the past 3 months for chest tightness and does endorse some weight gain.     In the ED pt found to have A Flutter and given Amio 400 po and Dig loaded.  (23 Oct 2022 04:23)      INTERIM EVENTS:Patient seen at bedside ,interim events noted.      PMH -reviewed admission note, no change since admission  HEART FAILURE: Acute[ ]Chronic[ ] Systolic[ ] Diastolic[ ] Combined Systolic and Diastolic[ ]  CAD[ ] CABG[ ] PCI[ ]  DEVICES[ ] PPM[ ] ICD[ ] ILR[ ]  ATRIAL FIBRILLATION[ ] Paroxysmal[ ] Permanent[ ] CHADS2-[  ]  MISAEL[ ] CKD1[ ] CKD2[ ] CKD3[ ] CKD4[ ] ESRD[ ]  COPD[ ] HTN[ ]   DM[ ] Type1[ ] Type 2[ ]   CVA[ ] Paresis[ ]    AMBULATION: Assisted[ ] Cane/walker[ ] Independent[ ]    MEDICATIONS  (STANDING):  aMIOdarone    Tablet 200 milliGRAM(s) Oral two times a day  buMETAnide 2 milliGRAM(s) Oral daily  insulin glargine Injectable (LANTUS) 12 Unit(s) SubCutaneous at bedtime  insulin lispro (ADMELOG) corrective regimen sliding scale   SubCutaneous at bedtime  insulin lispro (ADMELOG) corrective regimen sliding scale   SubCutaneous three times a day before meals  iron sucrose IVPB 200 milliGRAM(s) IV Intermittent once  isosorbide   mononitrate ER Tablet (IMDUR) 30 milliGRAM(s) Oral daily  lidocaine   4% Patch 1 Patch Transdermal daily  pantoprazole    Tablet 40 milliGRAM(s) Oral before breakfast  polyethylene glycol 3350 17 Gram(s) Oral daily  predniSONE   Tablet 5 milliGRAM(s) Oral daily  senna 2 Tablet(s) Oral at bedtime  simvastatin 10 milliGRAM(s) Oral at bedtime    MEDICATIONS  (PRN):  acetaminophen     Tablet .. 650 milliGRAM(s) Oral every 6 hours PRN Temp greater or equal to 38C (100.4F), Mild Pain (1 - 3)  ALBUTerol    90 MICROgram(s) HFA Inhaler 2 Puff(s) Inhalation every 6 hours PRN Bronchospasm  melatonin 3 milliGRAM(s) Oral at bedtime PRN Insomnia            REVIEW OF SYSTEMS:  Constitutional: [ ] fever, [ ]weight loss,  [ ]fatigue [ ]weight gain  Eyes: [ ] visual changes  Respiratory: [ ]shortness of breath;  [ ] cough, [ ]wheezing, [ ]chills, [ ]hemoptysis  Cardiovascular: [ ] chest pain, [ ]palpitations, [ ]dizziness,  [ ]leg swelling[ ]orthopnea[ ]PND  Gastrointestinal: [ ] abdominal pain, [ ]nausea, [ ]vomiting,  [ ]diarrhea [ ]Constipation [ ]Melena  Genitourinary: [ ] dysuria, [ ] hematuria [ ]Junior  Neurologic: [ ] headaches [ ] tremors[ ]weakness [ ]Paralysis Right[ ] Left[ ]  Skin: [ ] itching, [ ]burning, [ ] rashes  Endocrine: [ ] heat or cold intolerance  Musculoskeletal: [ ] joint pain or swelling; [ ] muscle, back, or extremity pain  Psychiatric: [ ] depression, [ ]anxiety, [ ]mood swings, or [ ]difficulty sleeping  Hematologic: [ ] easy bruising, [ ] bleeding gums    [ ] All remaining systems negative except as per above.   [ ]Unable to obtain.  [x] No change in ROS since admission      Vital Signs Last 24 Hrs  T(C): 36.3 (08 Nov 2022 04:40), Max: 36.6 (07 Nov 2022 21:12)  T(F): 97.3 (08 Nov 2022 04:40), Max: 97.9 (07 Nov 2022 21:12)  HR: 54 (08 Nov 2022 04:40) (54 - 58)  BP: 148/72 (08 Nov 2022 04:40) (139/79 - 172/92)  BP(mean): --  RR: 18 (08 Nov 2022 04:40) (18 - 22)  SpO2: 98% (08 Nov 2022 04:40) (95% - 99%)    Parameters below as of 08 Nov 2022 04:40  Patient On (Oxygen Delivery Method): nasal cannula      I&O's Summary    07 Nov 2022 07:01  -  08 Nov 2022 07:00  --------------------------------------------------------  IN: 1110 mL / OUT: 1550 mL / NET: -440 mL        PHYSICAL EXAM:  General: No acute distress BMI-  HEENT: EOMI, PERRL  Neck: Supple, [ ] JVD  Lungs: Equal air entry bilaterally; [ ] rales [ ] wheezing [ ] rhonchi  Heart: Regular rate and rhythm; [x ] murmur   2/6 [ x] systolic [ ] diastolic [ ] radiation[ ] rubs [ ]  gallops  Abdomen: Nontender, bowel sounds present  Extremities: No clubbing, cyanosis, [ ] edema [ ]Pulses  equal and intact  Nervous system:  Alert & Oriented X3, no focal deficits  Psychiatric: Normal affect  Skin: No rashes or lesions    LABS:  11-08    142  |  97  |  78<H>  ----------------------------<  102<H>  4.3   |  35<H>  |  2.47<H>    Ca    9.9      08 Nov 2022 07:30      Creatinine Trend: 2.47<--, 2.83<--, 3.24<--, 3.34<--, 2.85<--, 2.30<--                        9.5    5.95  )-----------( 204      ( 07 Nov 2022 07:34 )             33.9     PT/INR - ( 08 Nov 2022 06:45 )   PT: 24.3 sec;   INR: 2.08 ratio         PTT - ( 07 Nov 2022 07:36 )  PTT:45.1 sec    < from: Transesophageal Echocardiogram w/o TTE (11.02.22 @ 18:09) >    Patient name: DAMARI GUERRERO  YOB: 1950   Age: 72 (F)   MR#: [id number]  Study Date: 11/2/2022  Location: [address] [place], [place] [id number]: Khai Gordon MD  Study quality: Technically good  Referring Physician: Renny Newman MD  Blood Pressure: 160/92 mmHg  Height: 163 cm  Weight: 116 kg  BSA: 2.2 m2  Heart Rate: 95 mmHg  ------------------------------------------------------------------------  PROCEDURE: Transesophageal (ELIZABETH) was performed.  Informed  consent was first obtained for ELIZABETH. The patient was sedated  - see anesthesia record.  The procedure was monitored with  automatic blood pressure monitoring, ECG tracings and pulse  oximetry. The transesophageal probe was placed in the  esophagus posterior to the heart without complications.  INDICATION: Unspecified atrial fibrillation (I48.91)  ------------------------------------------------------------------------  Dimensions:    Normal Values:  LA:            2.0 - 4.0 cm  Ao:            2.0 - 3.8 cm  SEPTUM:    0.6 - 1.2 cm  PWT:           0.6 - 1.1 cm  LVIDd:         3.0 - 5.6 cm  LVIDs:         1.8 - 4.0 cm  EF (Visual Estimate): na %  ------------------------------------------------------------------------  Observations:  Mitral Valve: Mechanical prosthetic mitral valve  replacement. The mechanical leaflets are opening well.  Aortic Valve/Aorta:  Simple atheroma noted in aortic arch/descending aorta.  Left Atrium: No left atrium or left atrial appendage  thrombus.  Left Ventricle: Endocardium notwell visualized; unable to  evaluate left ventricular systolic function.  Pericardium/Pleura: Normal pericardium with no pericardial  effusion.  ------------------------------------------------------------------------  Conclusions:  1. Mechanical prosthetic mitral valve replacement. The  mechanical leaflets are opening well.  2. No left atrium or left atrial appendage thrombus.  ------------------------------------------------------------------------  Confirmed on  11/4/2022 - 17:35:48 by Stu Domínguez M.D.  ------------------------------------------------------------    < end of copied text >  < from: Transesophageal Echocardiogram w/o TTE (11.02.22 @ 18:09) >    Patient name: DAMARI GUERRERO  YOB: 1950   Age: 72 (F)   MR#: [id number]  Study Date: 11/2/2022  Location: [address] [place], [place] [id number]: Khai Gordon MD  Study quality: Technically good  Referring Physician: Renny Newman MD  Blood Pressure: 160/92 mmHg  Height: 163 cm  Weight: 116 kg  BSA: 2.2 m2  Heart Rate: 95 mmHg  ------------------------------------------------------------------------  PROCEDURE: Transesophageal (ELIZABETH) was performed.  Informed  consent was first obtained for ELIZABETH. The patient was sedated  - see anesthesia record.  The procedure was monitored with  automatic blood pressure monitoring, ECG tracings and pulse  oximetry. The transesophageal probe was placed in the  esophagus posterior to the heart without complications.  INDICATION: Unspecified atrial fibrillation (I48.91)  ------------------------------------------------------------------------  Dimensions:    Normal Values:  LA:            2.0 - 4.0 cm  Ao:            2.0 - 3.8 cm  SEPTUM:    0.6 - 1.2 cm  PWT:           0.6 - 1.1 cm  LVIDd:         3.0 - 5.6 cm  LVIDs:         1.8 - 4.0 cm  EF (Visual Estimate): na %  ------------------------------------------------------------------------  Observations:  Mitral Valve: Mechanical prosthetic mitral valve  replacement. The mechanical leaflets are opening well.  Aortic Valve/Aorta:  Simple atheroma noted in aortic arch/descending aorta.  Left Atrium: No left atrium or left atrial appendage  thrombus.  Left Ventricle: Endocardium notwell visualized; unable to  evaluate left ventricular systolic function.  Pericardium/Pleura: Normal pericardium with no pericardial  effusion.  ------------------------------------------------------------------------  Conclusions:  1. Mechanical prosthetic mitral valve replacement. The  mechanical leaflets are opening well.  2. No left atrium or left atrial appendage thrombus.  ------------------------------------------------------------------------  Confirmed on  11/4/2022 - 17:35:48 by Stu Domínguez M.D.  ------------------------------------------------------------    < end of copied text >

## 2022-11-08 NOTE — PROGRESS NOTE ADULT - SUBJECTIVE AND OBJECTIVE BOX
SUBJECTIVE / OVERNIGHT EVENTS:pt seen and examined  MEDICATIONS  (STANDING):  aMIOdarone    Tablet 200 milliGRAM(s) Oral two times a day  buMETAnide 2 milliGRAM(s) Oral daily  insulin glargine Injectable (LANTUS) 12 Unit(s) SubCutaneous at bedtime  insulin lispro (ADMELOG) corrective regimen sliding scale   SubCutaneous at bedtime  insulin lispro (ADMELOG) corrective regimen sliding scale   SubCutaneous three times a day before meals  isosorbide   mononitrate ER Tablet (IMDUR) 30 milliGRAM(s) Oral daily  lidocaine   4% Patch 1 Patch Transdermal daily  pantoprazole    Tablet 40 milliGRAM(s) Oral before breakfast  polyethylene glycol 3350 17 Gram(s) Oral daily  predniSONE   Tablet 5 milliGRAM(s) Oral daily  senna 2 Tablet(s) Oral at bedtime  simvastatin 10 milliGRAM(s) Oral at bedtime    MEDICATIONS  (PRN):  acetaminophen     Tablet .. 650 milliGRAM(s) Oral every 6 hours PRN Temp greater or equal to 38C (100.4F), Mild Pain (1 - 3)  ALBUTerol    90 MICROgram(s) HFA Inhaler 2 Puff(s) Inhalation every 6 hours PRN Bronchospasm  melatonin 3 milliGRAM(s) Oral at bedtime PRN Insomnia    Vital Signs Last 24 Hrs  T(C): 36.6 (22 @ 20:48), Max: 36.6 (22 @ 11:21)  T(F): 97.8 (22 @ 20:48), Max: 97.9 (22 @ 11:21)  HR: 68 (22 @ 21:13) (50 - 71)  BP: 131/73 (22 @ 20:48) (131/73 - 148/72)  BP(mean): --  RR: 20 (22 @ 20:48) (18 - 20)  SpO2: 96% (22 @ 21:13) (92% - 100%)          Skin: No rash.  Eyes: No recent vision problems or eye pain.  ENT: No congestion, ear pain, or sore throat.  Cardiovascular: No chest pain or palpation.  Respiratory: No cough, shortness of breath, congestion, or wheezing.  Gastrointestinal: No abdominal pain, nausea, vomiting, or diarrhea.  Genitourinary: No dysuria.  Musculoskeletal: No joint swelling.  Neurologic: No headache.    PHYSICAL EXAM:  GENERAL: NAD  EYES: EOMI, PERRLA  NECK: Supple, No JVD  CHEST/LUNG: dec breath sounds at bases  HEART:  S1 , S2 +  ABDOMEN: soft , bs+  EXTREMITIES:  edema+  NEUROLOGY:alert awake oriented    LABS:      142  |  97  |  78<H>  ----------------------------<  102<H>  4.3   |  35<H>  |  2.47<H>    Ca    9.9      2022 07:30      Creatinine Trend: 2.47 <--, 2.83 <--, 3.24 <--, 3.34 <--, 2.85 <--, 2.30 <--, 2.36 <--, 2.31 <--                        9.5    5.95  )-----------( 204      ( 2022 07:34 )             33.9     Urine Studies:  Urinalysis Basic - ( 2022 03:01 )    Color: Light Yellow / Appearance: Clear / S.012 / pH:   Gluc:  / Ketone: Negative  / Bili: Negative / Urobili: Negative   Blood:  / Protein: Negative / Nitrite: Negative   Leuk Esterase: Negative / RBC: 0 /hpf / WBC 1 /HPF   Sq Epi:  / Non Sq Epi: 1 /hpf / Bacteria: Negative      Sodium, Random Urine: 86 mmol/L ( @ 03:02)  Creatinine, Random Urine: 51 mg/dL ( @ 03:02)            PT/INR - ( 2022 06:45 )   PT: 24.3 sec;   INR: 2.08 ratio         PTT - ( 2022 07:36 )  PTT:45.1 sec

## 2022-11-08 NOTE — PROGRESS NOTE ADULT - ASSESSMENT
71 y/o F with pmhx of COPD on home O2(3L) with severe pHTN, MVR/TVR , AF s/p ablation,  OHS/MIGUEL on home biPAP, HTN, HLD who presents with one week of vomiting with poor oral intake also complaining of SOB and chest pain.  She also states she has been taking prednisone on/off for the past 3 months for chest tightness and does endorse some weight gain.     In the ED pt found to have A Flutter and given Amio 400 po and Dig loaded.     #PULM  - Currently on home 3-4 L NC, No exp wheezing   - CXR likely fluid OL  - Continue proair inhalation  - c/w prednisone 5mg since on chronically for 3 months    AFLutter  -  s/p ELIZABETH/DCCV 11/4/22, currently junctional rhythm/ectopic atrial rhythm 40s - patient reports feeling ?weak  - Discontinue Metoprolol and Cardizem  - Adjust Amiodarone load to 200 mg BID for now  - may require PPM    MVR/TV replacement off AC  - echo technically difficult, unable to assess due to habitus and positioning  - per chart review under last name Nancy MRN [id number] - mechanical valve replacement 1999, was previously on coumadin but stopped in 09/2021 after worsening anemia, concern for GI bleed & epistaxis.   - continue with ac  -  ENDO  - A1c 10.7  - FS ~100-130  - Continue home Lantus and premeal, adjust as appropriate    Hyperkalemia- Kayexalate and f/u k

## 2022-11-08 NOTE — PROGRESS NOTE ADULT - ASSESSMENT
72 year old Female with history of COPD, CHF presents with SOB. Nephrology consulted for elevated Scr.    1) MISAEL: likely hemodynamically mediated in setting of aflutter and HF with recurrent MISAEL post-cardioversion with bradycardia. Scr improving. Continue with supportive care. UA bland with hyaline casts. FeNa elevated however drawn on diuretics (not accurate). CT without hydronephrosis. Avoid nephrotoxins.    2) CKD-3b: Baseline Scr 1.4-1.6 with CKD likely due to DM. Outpatient CKD work up. Monitor electrolytes.    3) HTN with CKD: BP controlled. Bradycardia as per cardiology/EP. Monitor BP.    4) LE edema: Improving. Continue with bumex 2 mg PO daily. TTE with mild to moderate LVSD. Monitor UO.    5) Hypercalcemia: Secondary to primary HPT. Defer sensipar given resolution of hypercalcemia. Monitor serum calcium.    6) Anemia of renal disease: Hb low with iron deficiency. Will give venofer 200 mg IV dose 2/3 today. Monitor Hb.    7) L renal mass: Not visualized on CT. Outpatient Urology evaluation.      [facility] NEPHROLOGY  Rodrigue Amador M.D.  Rob Perez D.O.  Ana Song M.D.  Lucrecia López, MSN, ANP-C    Telephone: [phone]  Facsimile: [phone]    [address] [place] [address]

## 2022-11-08 NOTE — PROGRESS NOTE ADULT - SUBJECTIVE AND OBJECTIVE BOX
Patient is a 72y Female     Patient is a 72y old  Female who presents with a chief complaint of Vomiting (07 Nov 2022 11:58)      HPI:  73 y/o F with pmhx of COPD on home O2(3L) with severe pHTN, MVR/TVR , AF s/p ablation,  OHS/MIGUEL on home biPAP, HTN, HLD who presents with one week of vomiting with poor oral intake also complaining of SOB and chest pain.  She also states she has been taking prednisone on/off for the past 3 months for chest tightness and does endorse some weight gain.     In the ED pt found to have A Flutter and given Amio 400 po and Dig loaded.  (23 Oct 2022 04:23)      PAST MEDICAL & SURGICAL HISTORY:  Atrial fibrillation  S/P Ablation      Pulmonary hypertension      MIGUEL (obstructive sleep apnea)      COPD (chronic obstructive pulmonary disease)  on home O2      CHF (congestive heart failure)      HTN (hypertension)      Gout      Mitral valve replaced      Tricuspid valve replaced      CHF (congestive heart failure)      Hypertension      COPD (chronic obstructive pulmonary disease)      History of mitral valve replacement with mechanical valve      Tricuspid valve replaced  mechanical          MEDICATIONS  (STANDING):  aMIOdarone    Tablet 200 milliGRAM(s) Oral two times a day  buMETAnide 2 milliGRAM(s) Oral daily  insulin glargine Injectable (LANTUS) 12 Unit(s) SubCutaneous at bedtime  insulin lispro (ADMELOG) corrective regimen sliding scale   SubCutaneous at bedtime  insulin lispro (ADMELOG) corrective regimen sliding scale   SubCutaneous three times a day before meals  isosorbide   mononitrate ER Tablet (IMDUR) 30 milliGRAM(s) Oral daily  lidocaine   4% Patch 1 Patch Transdermal daily  pantoprazole    Tablet 40 milliGRAM(s) Oral before breakfast  polyethylene glycol 3350 17 Gram(s) Oral daily  predniSONE   Tablet 5 milliGRAM(s) Oral daily  senna 2 Tablet(s) Oral at bedtime  simvastatin 10 milliGRAM(s) Oral at bedtime      Allergies    No Known Allergies    Intolerances        SOCIAL HISTORY:  Denies ETOh,Smoking,     FAMILY HISTORY:  Family history of hypertension (Father, Sibling)    Family history of stroke    Family history of hypertension (Mother)        REVIEW OF SYSTEMS:    CONSTITUTIONAL: No weakness, fevers or chills  EYES/ENT: No visual changes;  No vertigo or throat pain   NECK: No pain or stiffness  RESPIRATORY: No cough, wheezing, hemoptysis; No shortness of breath  CARDIOVASCULAR: No chest pain or palpitations  GASTROINTESTINAL: No abdominal or epigastric pain. No nausea, vomiting, or hematemesis; No diarrhea or constipation. No melena or hematochezia.  GENITOURINARY: No dysuria, frequency or hematuria  NEUROLOGICAL: No numbness or weakness  SKIN: No itching, burning, rashes, or lesions   All other review of systems is negative unless indicated above.    VITAL:  T(C): , Max: 36.6 (11-07-22 @ 21:12)  T(F): , Max: 97.9 (11-07-22 @ 21:12)  HR: 54 (11-08-22 @ 04:40)  BP: 148/72 (11-08-22 @ 04:40)  BP(mean): --  RR: 18 (11-08-22 @ 04:40)  SpO2: 98% (11-08-22 @ 04:40)  Wt(kg): --    I and O's:    11-06 @ 07:01  -  11-07 @ 07:00  --------------------------------------------------------  IN: 920 mL / OUT: 0 mL / NET: 920 mL    11-07 @ 07:01  -  11-08 @ 07:00  --------------------------------------------------------  IN: 1110 mL / OUT: 1550 mL / NET: -440 mL          PHYSICAL EXAM:    Constitutional: NAD  HEENT: PERRLA,   Neck: No JVD  Respiratory: CTA B/L  Cardiovascular: S1 and S2  Gastrointestinal: BS+, soft, NT/ND  Extremities: No peripheral edema  Neurological: A/O x 3, no focal deficits  Psychiatric: Normal mood, normal affect  : No Junior  Skin: No rashes  Access: Not applicable  Back: No CVA tenderness    LABS:                        9.5    5.95  )-----------( 204      ( 07 Nov 2022 07:34 )             33.9     11-07    140  |  96  |  87<H>  ----------------------------<  110<H>  4.7   |  35<H>  |  2.83<H>    Ca    10.1      07 Nov 2022 07:36            RADIOLOGY & ADDITIONAL STUDIES:

## 2022-11-08 NOTE — PROGRESS NOTE ADULT - SUBJECTIVE AND OBJECTIVE BOX
[facility] NEPHROLOGY- PROGRESS NOTE    72 year old Female with history of COPD, CHF presents with SOB. Nephrology consulted for elevated Scr.    REVIEW OF SYSTEMS:  Gen: no fevers  Cards: no chest pain  Resp: + dyspnea with exertion improving  GI: no nausea or vomiting or diarrhea  Vascular: + LE edema improving    No Known Allergies      Hospital Medications: Medications reviewed      VITALS:  T(F): 97.3 (22 @ 04:40), Max: 97.9 (22 @ 21:12)  HR: 54 (22 @ 04:40)  BP: 148/72 (22 @ 04:40)  RR: 18 (22 @ 04:40)  SpO2: 98% (22 @ 04:40)  Wt(kg): --     @ 07:01  -   @ 07:00  --------------------------------------------------------  IN: 1110 mL / OUT: 1550 mL / NET: -440 mL        PHYSICAL EXAM:    Gen: NAD, calm  Cards: Irregularly irregular, +S1/S2, no M/G/R  Resp: CTA B/L  GI: soft, NT/ND, NABS  Vascular: 1+ LE edema B/L      LABS:      142  |  97  |  78<H>  ----------------------------<  102<H>  4.3   |  35<H>  |  2.47<H>    Ca    9.9      2022 07:30      Creatinine Trend: 2.47 <--, 2.83 <--, 3.24 <--, 3.34 <--, 2.85 <--, 2.30 <--, 2.36 <--, 2.31 <--                        9.5    5.95  )-----------( 204      ( 2022 07:34 )             33.9     Urine Studies:  Urinalysis Basic - ( 2022 03:01 )    Color: Light Yellow / Appearance: Clear / S.012 / pH:   Gluc:  / Ketone: Negative  / Bili: Negative / Urobili: Negative   Blood:  / Protein: Negative / Nitrite: Negative   Leuk Esterase: Negative / RBC: 0 /hpf / WBC 1 /HPF   Sq Epi:  / Non Sq Epi: 1 /hpf / Bacteria: Negative      Sodium, Random Urine: 86 mmol/L ( @ 03:02)  Creatinine, Random Urine: 51 mg/dL ( @ 03:02)

## 2022-11-09 LAB
GLUCOSE BLDC GLUCOMTR-MCNC: 117 MG/DL — HIGH (ref 70–99)
GLUCOSE BLDC GLUCOMTR-MCNC: 147 MG/DL — HIGH (ref 70–99)
GLUCOSE BLDC GLUCOMTR-MCNC: 158 MG/DL — HIGH (ref 70–99)
GLUCOSE BLDC GLUCOMTR-MCNC: 186 MG/DL — HIGH (ref 70–99)
INR BLD: 2.24 RATIO — HIGH (ref 0.88–1.16)
PROTHROM AB SERPL-ACNC: 26.2 SEC — HIGH (ref 10.5–13.4)
SARS-COV-2 RNA SPEC QL NAA+PROBE: SIGNIFICANT CHANGE UP

## 2022-11-09 PROCEDURE — 99232 SBSQ HOSP IP/OBS MODERATE 35: CPT

## 2022-11-09 RX ORDER — WARFARIN SODIUM 2.5 MG/1
5 TABLET ORAL ONCE
Refills: 0 | Status: COMPLETED | OUTPATIENT
Start: 2022-11-09 | End: 2022-11-09

## 2022-11-09 RX ORDER — AMIODARONE HYDROCHLORIDE 400 MG/1
200 TABLET ORAL ONCE
Refills: 0 | Status: COMPLETED | OUTPATIENT
Start: 2022-11-09 | End: 2022-11-09

## 2022-11-09 RX ORDER — IRON SUCROSE 20 MG/ML
200 INJECTION, SOLUTION INTRAVENOUS ONCE
Refills: 0 | Status: COMPLETED | OUTPATIENT
Start: 2022-11-09 | End: 2022-11-09

## 2022-11-09 RX ORDER — AMIODARONE HYDROCHLORIDE 400 MG/1
200 TABLET ORAL DAILY
Refills: 0 | Status: DISCONTINUED | OUTPATIENT
Start: 2022-11-10 | End: 2022-11-16

## 2022-11-09 RX ADMIN — POLYETHYLENE GLYCOL 3350 17 GRAM(S): 17 POWDER, FOR SOLUTION ORAL at 13:12

## 2022-11-09 RX ADMIN — BUMETANIDE 2 MILLIGRAM(S): 0.25 INJECTION INTRAMUSCULAR; INTRAVENOUS at 06:27

## 2022-11-09 RX ADMIN — PANTOPRAZOLE SODIUM 40 MILLIGRAM(S): 20 TABLET, DELAYED RELEASE ORAL at 06:28

## 2022-11-09 RX ADMIN — ISOSORBIDE MONONITRATE 30 MILLIGRAM(S): 60 TABLET, EXTENDED RELEASE ORAL at 13:12

## 2022-11-09 RX ADMIN — SIMVASTATIN 10 MILLIGRAM(S): 20 TABLET, FILM COATED ORAL at 22:55

## 2022-11-09 RX ADMIN — INSULIN GLARGINE 12 UNIT(S): 100 INJECTION, SOLUTION SUBCUTANEOUS at 22:54

## 2022-11-09 RX ADMIN — LIDOCAINE 1 PATCH: 4 CREAM TOPICAL at 13:12

## 2022-11-09 RX ADMIN — SENNA PLUS 2 TABLET(S): 8.6 TABLET ORAL at 22:55

## 2022-11-09 RX ADMIN — IRON SUCROSE 110 MILLIGRAM(S): 20 INJECTION, SOLUTION INTRAVENOUS at 15:22

## 2022-11-09 RX ADMIN — LIDOCAINE 1 PATCH: 4 CREAM TOPICAL at 19:06

## 2022-11-09 RX ADMIN — WARFARIN SODIUM 5 MILLIGRAM(S): 2.5 TABLET ORAL at 22:54

## 2022-11-09 RX ADMIN — AMIODARONE HYDROCHLORIDE 200 MILLIGRAM(S): 400 TABLET ORAL at 06:28

## 2022-11-09 RX ADMIN — AMIODARONE HYDROCHLORIDE 200 MILLIGRAM(S): 400 TABLET ORAL at 18:53

## 2022-11-09 RX ADMIN — Medication 2: at 17:29

## 2022-11-09 RX ADMIN — Medication 5 MILLIGRAM(S): at 06:28

## 2022-11-09 NOTE — PROGRESS NOTE ADULT - ASSESSMENT
71 y/o F with pmhx of COPD on home O2(3L) with severe pHTN, MVR/TVR , AF s/p ablation,  OHS/MIGUEL on home biPAP, HTN, HLD who presents with one week of vomiting with poor oral intake also complaining of SOB and chest pain.  She also states she has been taking prednisone on/off for the past 3 months for chest tightness and does endorse some weight gain.     In the ED pt found to have A Flutter and given Amio 400 po and Dig loaded.     #PULM  - Currently on home 3-4 L NC, No exp wheezing   - CXR likely fluid OL  - Continue proair inhalation  - c/w prednisone 5mg since on chronically for 3 months    AFLutter  -  s/p ELIZABETH/DCCV 11/4/22, currently junctional rhythm/ectopic atrial rhythm 40s - patient reports feeling ?weak  - Discontinue Metoprolol and Cardizem  - Adjust Amiodarone load to 200 mg BID for now  - If rates don't improve, may require PPM    MVR/TV replacement off AC  - echo technically difficult, unable to assess due to habitus and positioning  - per chart review under last name Nancy MRN [id number] - mechanical valve replacement 1999, was previously on coumadin but stopped in 09/2021 after worsening anemia, concern for GI bleed & epistaxis.   - continue with ac  -  ENDO  - A1c 10.7  - FS ~100-130  -adjust insulin for optimal blood sugar control    Hyperkalemia- Kayexalate and f/u k

## 2022-11-09 NOTE — PROGRESS NOTE ADULT - SUBJECTIVE AND OBJECTIVE BOX
24H hour events: No acute events    MEDICATIONS:  aMIOdarone    Tablet 200 milliGRAM(s) Oral two times a day  buMETAnide 2 milliGRAM(s) Oral daily  isosorbide   mononitrate ER Tablet (IMDUR) 30 milliGRAM(s) Oral daily  ALBUTerol    90 MICROgram(s) HFA Inhaler 2 Puff(s) Inhalation every 6 hours PRN  acetaminophen     Tablet .. 650 milliGRAM(s) Oral every 6 hours PRN  melatonin 3 milliGRAM(s) Oral at bedtime PRN  pantoprazole    Tablet 40 milliGRAM(s) Oral before breakfast  polyethylene glycol 3350 17 Gram(s) Oral daily  senna 2 Tablet(s) Oral at bedtime  insulin glargine Injectable (LANTUS) 12 Unit(s) SubCutaneous at bedtime  insulin lispro (ADMELOG) corrective regimen sliding scale   SubCutaneous at bedtime  insulin lispro (ADMELOG) corrective regimen sliding scale   SubCutaneous three times a day before meals  predniSONE   Tablet 5 milliGRAM(s) Oral daily  simvastatin 10 milliGRAM(s) Oral at bedtime    lidocaine   4% Patch 1 Patch Transdermal daily      REVIEW OF SYSTEMS:  Complete 12 point ROS negative.    PHYSICAL EXAM:  T(C): 36.3 (11-09-22 @ 05:07), Max: 36.6 (11-08-22 @ 11:21)  HR: 58 (11-09-22 @ 05:07) (50 - 71)  BP: 153/74 (11-09-22 @ 05:07) (131/73 - 153/74)  RR: 18 (11-09-22 @ 05:07) (18 - 20)  SpO2: 97% (11-09-22 @ 05:07) (92% - 100%)  Wt(kg): --  I&O's Summary    08 Nov 2022 07:01  -  09 Nov 2022 07:00  --------------------------------------------------------  IN: 1260 mL / OUT: 1900 mL / NET: -640 mL        Appearance: Normal	  HEENT:  PERRL, EOMI	  Cardiovascular: Normal S1 S2, ofelia, No JVD, No murmurs, No edema  Respiratory: Lungs clear to auscultation	  Psychiatry: A & O x 3, Mood & affect appropriate  Gastrointestinal:  Soft, Non-tender, + BS	  Skin: No rashes, No ecchymoses, No cyanosis	  Neurologic: Non-focal  Extremities: No clubbing, cyanosis or edema  Vascular: Peripheral pulses palpable 2+ bilaterally        LABS:	 	      11-08    142  |  97  |  78<H>  ----------------------------<  102<H>  4.3   |  35<H>  |  2.47<H>    Ca    9.9      08 Nov 2022 07:30      TELEMETRY: Junctional/ectopic atrial rhythm 45-60s, currently 55	      < from: Transesophageal Echocardiogram w/o TTE (11.02.22 @ 18:09) >  PROCEDURE: Transesophageal (ELIZABETH) was performed.  Informed  consent was first obtained for ELIZABETH. The patient was sedated  - see anesthesia record.  The procedure was monitored with  automatic blood pressure monitoring, ECG tracings and pulse  oximetry. The transesophageal probe was placed in the  esophagus posterior to the heart without complications.  INDICATION: Unspecified atrial fibrillation (I48.91)  ------------------------------------------------------------------------  Dimensions:    Normal Values:  LA:            2.0 - 4.0 cm  Ao:            2.0 - 3.8 cm  SEPTUM:    0.6 - 1.2 cm  PWT:           0.6 - 1.1 cm  LVIDd:         3.0 - 5.6 cm  LVIDs:         1.8 - 4.0 cm  EF (Visual Estimate): na %  ------------------------------------------------------------------------  Observations:  Mitral Valve: Mechanical prosthetic mitral valve  replacement. The mechanical leaflets are opening well.  Aortic Valve/Aorta:  Simple atheroma noted in aortic arch/descending aorta.  Left Atrium: No left atrium or left atrial appendage  thrombus.  Left Ventricle: Endocardium notwell visualized; unable to  evaluate left ventricular systolic function.  Pericardium/Pleura: Normal pericardium with no pericardial  effusion.  ------------------------------------------------------------------------  Conclusions:  1. Mechanical prosthetic mitral valve replacement. The  mechanical leaflets are opening well.  2. No left atrium or left atrial appendage thrombus.    < end of copied text >

## 2022-11-09 NOTE — PROGRESS NOTE ADULT - SUBJECTIVE AND OBJECTIVE BOX
Patient is a 72y Female     Patient is a 72y old  Female who presents with a chief complaint of Vomiting (08 Nov 2022 14:29)      HPI:  71 y/o F with pmhx of COPD on home O2(3L) with severe pHTN, MVR/TVR , AF s/p ablation,  OHS/MIGUEL on home biPAP, HTN, HLD who presents with one week of vomiting with poor oral intake also complaining of SOB and chest pain.  She also states she has been taking prednisone on/off for the past 3 months for chest tightness and does endorse some weight gain.     In the ED pt found to have A Flutter and given Amio 400 po and Dig loaded.  (23 Oct 2022 04:23)      PAST MEDICAL & SURGICAL HISTORY:  Atrial fibrillation  S/P Ablation      Pulmonary hypertension      MIGUEL (obstructive sleep apnea)      COPD (chronic obstructive pulmonary disease)  on home O2      CHF (congestive heart failure)      HTN (hypertension)      Gout      Mitral valve replaced      Tricuspid valve replaced      CHF (congestive heart failure)      Hypertension      COPD (chronic obstructive pulmonary disease)      History of mitral valve replacement with mechanical valve      Tricuspid valve replaced  mechanical          MEDICATIONS  (STANDING):  aMIOdarone    Tablet 200 milliGRAM(s) Oral two times a day  buMETAnide 2 milliGRAM(s) Oral daily  insulin glargine Injectable (LANTUS) 12 Unit(s) SubCutaneous at bedtime  insulin lispro (ADMELOG) corrective regimen sliding scale   SubCutaneous at bedtime  insulin lispro (ADMELOG) corrective regimen sliding scale   SubCutaneous three times a day before meals  isosorbide   mononitrate ER Tablet (IMDUR) 30 milliGRAM(s) Oral daily  lidocaine   4% Patch 1 Patch Transdermal daily  pantoprazole    Tablet 40 milliGRAM(s) Oral before breakfast  polyethylene glycol 3350 17 Gram(s) Oral daily  predniSONE   Tablet 5 milliGRAM(s) Oral daily  senna 2 Tablet(s) Oral at bedtime  simvastatin 10 milliGRAM(s) Oral at bedtime      Allergies    No Known Allergies    Intolerances        SOCIAL HISTORY:  Denies ETOh,Smoking,     FAMILY HISTORY:  Family history of hypertension (Father, Sibling)    Family history of stroke    Family history of hypertension (Mother)        REVIEW OF SYSTEMS:    CONSTITUTIONAL: No weakness, fevers or chills  EYES/ENT: No visual changes;  No vertigo or throat pain   NECK: No pain or stiffness  RESPIRATORY: No cough, wheezing, hemoptysis; No shortness of breath  CARDIOVASCULAR: No chest pain or palpitations  GASTROINTESTINAL: No abdominal or epigastric pain. No nausea, vomiting, or hematemesis; No diarrhea or constipation. No melena or hematochezia.  GENITOURINARY: No dysuria, frequency or hematuria  NEUROLOGICAL: No numbness or weakness  SKIN: No itching, burning, rashes, or lesions   All other review of systems is negative unless indicated above.    VITAL:  T(C): , Max: 36.6 (11-08-22 @ 11:21)  T(F): , Max: 97.9 (11-08-22 @ 11:21)  HR: 58 (11-09-22 @ 05:07)  BP: 153/74 (11-09-22 @ 05:07)  BP(mean): --  RR: 18 (11-09-22 @ 05:07)  SpO2: 97% (11-09-22 @ 05:07)  Wt(kg): --    I and O's:    11-07 @ 07:01  -  11-08 @ 07:00  --------------------------------------------------------  IN: 1110 mL / OUT: 1550 mL / NET: -440 mL    11-08 @ 07:01  -  11-09 @ 07:00  --------------------------------------------------------  IN: 1260 mL / OUT: 1900 mL / NET: -640 mL          PHYSICAL EXAM:    Constitutional: NAD  HEENT: PERRLA,   Neck: No JVD  Respiratory: CTA B/L  Cardiovascular: S1 and S2  Gastrointestinal: BS+, soft, NT/ND  Extremities: No peripheral edema  Neurological: A/O x 3, no focal deficits  Psychiatric: Normal mood, normal affect  : No Junior  Skin: No rashes  Access: Not applicable  Back: No CVA tenderness    LABS:    11-08    142  |  97  |  78<H>  ----------------------------<  102<H>  4.3   |  35<H>  |  2.47<H>    Ca    9.9      08 Nov 2022 07:30            RADIOLOGY & ADDITIONAL STUDIES:

## 2022-11-09 NOTE — PROGRESS NOTE ADULT - SUBJECTIVE AND OBJECTIVE BOX
[facility] NEPHROLOGY- PROGRESS NOTE    72 year old Female with history of COPD, CHF presents with SOB. Nephrology consulted for elevated Scr.    REVIEW OF SYSTEMS:  Gen: no fevers  Cards: no chest pain  Resp: + dyspnea with exertion improving  GI: no nausea or vomiting or diarrhea  Vascular: + LE edema improving    No Known Allergies      Hospital Medications: Medications reviewed      VITALS:  T(F): 97.4 (22 @ 05:07), Max: 97.9 (22 @ 11:21)  HR: 75 (22 @ 10:02)  BP: 153/74 (22 @ 05:07)  RR: 18 (22 @ 05:07)  SpO2: 97% (22 @ 05:07)  Wt(kg): --     @ 07:01  -   @ 07:00  --------------------------------------------------------  IN: 1260 mL / OUT: 1900 mL / NET: -640 mL        PHYSICAL EXAM:    Gen: NAD, calm  Cards: Irregularly irregular, +S1/S2, no M/G/R  Resp: CTA B/L  GI: soft, NT/ND, NABS  Vascular: 1+ LE edema B/L      LABS:      142  |  97  |  78<H>  ----------------------------<  102<H>  4.3   |  35<H>  |  2.47<H>    Ca    9.9      2022 07:30      Creatinine Trend: 2.47 <--, 2.83 <--, 3.24 <--, 3.34 <--, 2.85 <--, 2.30 <--, 2.36 <--, 2.31 <--                        9.5    5.95  )-----------( 204      ( 2022 07:34 )             33.9     Urine Studies:  Urinalysis Basic - ( 2022 03:01 )    Color: Light Yellow / Appearance: Clear / S.012 / pH:   Gluc:  / Ketone: Negative  / Bili: Negative / Urobili: Negative   Blood:  / Protein: Negative / Nitrite: Negative   Leuk Esterase: Negative / RBC: 0 /hpf / WBC 1 /HPF   Sq Epi:  / Non Sq Epi: 1 /hpf / Bacteria: Negative      Sodium, Random Urine: 86 mmol/L ( @ 03:02)  Creatinine, Random Urine: 51 mg/dL ( @ 03:02)

## 2022-11-09 NOTE — PROGRESS NOTE ADULT - SUBJECTIVE AND OBJECTIVE BOX
SUBJECTIVE / OVERNIGHT EVENTS:pt seen and examined    MEDICATIONS  (STANDING):  buMETAnide 2 milliGRAM(s) Oral daily  insulin glargine Injectable (LANTUS) 12 Unit(s) SubCutaneous at bedtime  insulin lispro (ADMELOG) corrective regimen sliding scale   SubCutaneous at bedtime  insulin lispro (ADMELOG) corrective regimen sliding scale   SubCutaneous three times a day before meals  isosorbide   mononitrate ER Tablet (IMDUR) 30 milliGRAM(s) Oral daily  lidocaine   4% Patch 1 Patch Transdermal daily  pantoprazole    Tablet 40 milliGRAM(s) Oral before breakfast  polyethylene glycol 3350 17 Gram(s) Oral daily  predniSONE   Tablet 5 milliGRAM(s) Oral daily  senna 2 Tablet(s) Oral at bedtime  simvastatin 10 milliGRAM(s) Oral at bedtime  warfarin 5 milliGRAM(s) Oral once    MEDICATIONS  (PRN):  acetaminophen     Tablet .. 650 milliGRAM(s) Oral every 6 hours PRN Temp greater or equal to 38C (100.4F), Mild Pain (1 - 3)  ALBUTerol    90 MICROgram(s) HFA Inhaler 2 Puff(s) Inhalation every 6 hours PRN Bronchospasm  melatonin 3 milliGRAM(s) Oral at bedtime PRN Insomnia    Vital Signs Last 24 Hrs  T(C): 36.6 (22 @ 21:14), Max: 36.8 (22 @ 12:06)  T(F): 97.8 (22 @ 21:14), Max: 98.2 (22 @ 12:06)  HR: 53 (22 @ :14) (53 - 75)  BP: 137/73 (22 @ 21:14) (137/73 - 156/72)  BP(mean): --  RR: 18 (22 @ 21:14) (18 - 18)  SpO2: 99% (22 @ 21:14) (95% - 100%)        Skin: No rash.  Eyes: No recent vision problems or eye pain.  ENT: No congestion, ear pain, or sore throat.  Cardiovascular: No chest pain or palpation.  Respiratory: No cough, shortness of breath, congestion, or wheezing.  Gastrointestinal: No abdominal pain, nausea, vomiting, or diarrhea.  Genitourinary: No dysuria.  Musculoskeletal: No joint swelling.  Neurologic: No headache.    PHYSICAL EXAM:  GENERAL: NAD  EYES: EOMI, PERRLA  NECK: Supple, No JVD  CHEST/LUNG: dec breath sounds at bases  HEART:  S1 , S2 +  ABDOMEN: soft , bs+  EXTREMITIES:  edema+  NEUROLOGY:alert awake oriented    LABS:      142  |  97  |  78<H>  ----------------------------<  102<H>  4.3   |  35<H>  |  2.47<H>    Ca    9.9      2022 07:30      Creatinine Trend: 2.47 <--, 2.83 <--, 3.24 <--, 3.34 <--, 2.85 <--, 2.30 <--, 2.36 <--, 2.31 <--    Urine Studies:  Urinalysis Basic - ( 2022 03:01 )    Color: Light Yellow / Appearance: Clear / S.012 / pH:   Gluc:  / Ketone: Negative  / Bili: Negative / Urobili: Negative   Blood:  / Protein: Negative / Nitrite: Negative   Leuk Esterase: Negative / RBC: 0 /hpf / WBC 1 /HPF   Sq Epi:  / Non Sq Epi: 1 /hpf / Bacteria: Negative      Sodium, Random Urine: 86 mmol/L ( @ 03:02)  Creatinine, Random Urine: 51 mg/dL ( @ 03:02)            PT/INR - ( 2022 09:57 )   PT: 26.2 sec;   INR: 2.24 ratio

## 2022-11-09 NOTE — PROGRESS NOTE ADULT - SUBJECTIVE AND OBJECTIVE BOX
PATIENT SEEN AND EXAMINED ON :- 11/9/22  DATE OF SERVICE:    11/9/22         Interim events noted,Labs ,Radiological studies and Cardiology tests reviewed .       HOSPITAL COURSE: HPI:  71 y/o F with pmhx of COPD on home O2(3L) with severe pHTN, MVR/TVR , AF s/p ablation,  OHS/MIGUEL on home biPAP, HTN, HLD who presents with one week of vomiting with poor oral intake also complaining of SOB and chest pain.  She also states she has been taking prednisone on/off for the past 3 months for chest tightness and does endorse some weight gain.     In the ED pt found to have A Flutter and given Amio 400 po and Dig loaded.  (23 Oct 2022 04:23)      INTERIM EVENTS:Patient seen at bedside ,interim events noted.      PMH -reviewed admission note, no change since admission  HEART FAILURE: Acute[ ]Chronic[ ] Systolic[ ] Diastolic[ ] Combined Systolic and Diastolic[ ]  CAD[ ] CABG[ ] PCI[ ]  DEVICES[ ] PPM[ ] ICD[ ] ILR[ ]  ATRIAL FIBRILLATION[ ] Paroxysmal[ ] Permanent[ ] CHADS2-[  ]  MISAEL[ ] CKD1[ ] CKD2[ ] CKD3[ ] CKD4[ ] ESRD[ ]  COPD[ ] HTN[ ]   DM[ ] Type1[ ] Type 2[ ]   CVA[ ] Paresis[ ]    AMBULATION: Assisted[ ] Cane/walker[ ] Independent[ ]    MEDICATIONS  (STANDING):  buMETAnide 2 milliGRAM(s) Oral daily  insulin glargine Injectable (LANTUS) 12 Unit(s) SubCutaneous at bedtime  insulin lispro (ADMELOG) corrective regimen sliding scale   SubCutaneous at bedtime  insulin lispro (ADMELOG) corrective regimen sliding scale   SubCutaneous three times a day before meals  isosorbide   mononitrate ER Tablet (IMDUR) 30 milliGRAM(s) Oral daily  lidocaine   4% Patch 1 Patch Transdermal daily  pantoprazole    Tablet 40 milliGRAM(s) Oral before breakfast  polyethylene glycol 3350 17 Gram(s) Oral daily  predniSONE   Tablet 5 milliGRAM(s) Oral daily  senna 2 Tablet(s) Oral at bedtime  simvastatin 10 milliGRAM(s) Oral at bedtime  warfarin 5 milliGRAM(s) Oral once    MEDICATIONS  (PRN):  acetaminophen     Tablet .. 650 milliGRAM(s) Oral every 6 hours PRN Temp greater or equal to 38C (100.4F), Mild Pain (1 - 3)  ALBUTerol    90 MICROgram(s) HFA Inhaler 2 Puff(s) Inhalation every 6 hours PRN Bronchospasm  melatonin 3 milliGRAM(s) Oral at bedtime PRN Insomnia            REVIEW OF SYSTEMS:  Constitutional: [ ] fever, [ ]weight loss,  [ ]fatigue [ ]weight gain  Eyes: [ ] visual changes  Respiratory: [ ]shortness of breath;  [ ] cough, [ ]wheezing, [ ]chills, [ ]hemoptysis  Cardiovascular: [ ] chest pain, [ ]palpitations, [ ]dizziness,  [ ]leg swelling[ ]orthopnea[ ]PND  Gastrointestinal: [ ] abdominal pain, [ ]nausea, [ ]vomiting,  [ ]diarrhea [ ]Constipation [ ]Melena  Genitourinary: [ ] dysuria, [ ] hematuria [ ]Junior  Neurologic: [ ] headaches [ ] tremors[ ]weakness [ ]Paralysis Right[ ] Left[ ]  Skin: [ ] itching, [ ]burning, [ ] rashes  Endocrine: [ ] heat or cold intolerance  Musculoskeletal: [ ] joint pain or swelling; [ ] muscle, back, or extremity pain  Psychiatric: [ ] depression, [ ]anxiety, [ ]mood swings, or [ ]difficulty sleeping  Hematologic: [ ] easy bruising, [ ] bleeding gums    [ ] All remaining systems negative except as per above.   [ ]Unable to obtain.  [x] No change in ROS since admission      Vital Signs Last 24 Hrs  T(C): 36.8 (09 Nov 2022 12:06), Max: 36.8 (09 Nov 2022 12:06)  T(F): 98.2 (09 Nov 2022 12:06), Max: 98.2 (09 Nov 2022 12:06)  HR: 59 (09 Nov 2022 12:06) (57 - 75)  BP: 156/72 (09 Nov 2022 12:06) (131/73 - 156/72)  BP(mean): --  RR: 18 (09 Nov 2022 12:06) (18 - 20)  SpO2: 100% (09 Nov 2022 12:06) (92% - 100%)    Parameters below as of 09 Nov 2022 12:06  Patient On (Oxygen Delivery Method): nasal cannula      I&O's Summary    08 Nov 2022 07:01  -  09 Nov 2022 07:00  --------------------------------------------------------  IN: 1260 mL / OUT: 1900 mL / NET: -640 mL    09 Nov 2022 07:01  -  09 Nov 2022 20:45  --------------------------------------------------------  IN: 420 mL / OUT: 950 mL / NET: -530 mL        PHYSICAL EXAM:  General: No acute distress BMI-  HEENT: EOMI, PERRL  Neck: Supple, [ ] JVD  Lungs: Equal air entry bilaterally; [ ] rales [ ] wheezing [ ] rhonchi  Heart: Regular rate and rhythm; [x ] murmur   2/6 [ x] systolic [ ] diastolic [ ] radiation[ ] rubs [ ]  gallops  Abdomen: Nontender, bowel sounds present  Extremities: No clubbing, cyanosis, [ ] edema [ ]Pulses  equal and intact  Nervous system:  Alert & Oriented X3, no focal deficits  Psychiatric: Normal affect  Skin: No rashes or lesions    LABS:  11-08    142  |  97  |  78<H>  ----------------------------<  102<H>  4.3   |  35<H>  |  2.47<H>    Ca    9.9      08 Nov 2022 07:30      Creatinine Trend: 2.47<--, 2.83<--, 3.24<--, 3.34<--, 2.85<--, 2.30<--    PT/INR - ( 09 Nov 2022 09:57 )   PT: 26.2 sec;   INR: 2.24 ratio

## 2022-11-09 NOTE — PROGRESS NOTE ADULT - ASSESSMENT
72 year old Female with history of COPD, CHF presents with SOB. Nephrology consulted for elevated Scr.    1) MISAEL: likely hemodynamically mediated in setting of aflutter and HF with recurrent MISAEL post-cardioversion with bradycardia. Scr improving. Continue with supportive care. UA bland with hyaline casts. FeNa elevated however drawn on diuretics (not accurate). CT without hydronephrosis. Avoid nephrotoxins.    2) CKD-3b: Baseline Scr 1.4-1.6 with CKD likely due to DM. Outpatient CKD work up. Monitor electrolytes.    3) HTN with CKD: BP controlled. Bradycardia as per cardiology/EP. Monitor BP.    4) LE edema: Improving with good UO (1.9L). Continue with bumex 2 mg PO daily. TTE with mild to moderate LVSD. Monitor UO.    5) Hypercalcemia: Secondary to primary HPT. Defer sensipar given resolution of hypercalcemia. Monitor serum calcium.    6) Anemia of renal disease: Hb low with iron deficiency. Will give venofer 200 mg IV dose 3/3 today. Monitor Hb.    7) L renal mass: Not visualized on CT. Outpatient Urology evaluation.      [facility] NEPHROLOGY  Rodrigue Amador M.D.  Rob Perez D.O.  Ana Song M.D.  Lucrecia López, MSN, ANP-C    Telephone: [phone]  Facsimile: [phone]    [address] [place] [address]

## 2022-11-09 NOTE — PROGRESS NOTE ADULT - ASSESSMENT
73yo female with a PMH of COPD on home O2(3L) with severe pHTN, MVR/TVR , AF s/p ablation,  OHS/MIGUEL on home BiPAP, HTN, HLD who initially presented to the ED as a transfer from [facility] for AFL s/p Amiodarone initiation. EP was re-consulted due to an episode of AFL in the 130's and due to concern for continued Amiodarone use given history of COPD and long term medication toxicity.    1. AFL s/p ELIZABETH/DCCV  2. Ectopic atrial rhythm    - s/p ELIZABETH/DCCV 11/4/22, currently junctional rhythm/ectopic atrial rhythm 50s - patient reports chronic dyspnea but no light-headedness, chest pain, palpitations   - Currently on Amiodarone 200 mg BID, will need close F/U of pulmonary status but okay to continue with underlying COPD   - Continue to dose Coumadin, INR today pending   - HRs improving, will monitor off of Diltiazem and Metoprolol; no plan for PPM at this time  - Nephrology following for MISAEL on CKD  - Close telemetry monitoring  - Maintain K>4.0 and Mg>2.0

## 2022-11-10 ENCOUNTER — TRANSCRIPTION ENCOUNTER (OUTPATIENT)
Age: 72
End: 2022-11-10

## 2022-11-10 LAB
GLUCOSE BLDC GLUCOMTR-MCNC: 124 MG/DL — HIGH (ref 70–99)
GLUCOSE BLDC GLUCOMTR-MCNC: 134 MG/DL — HIGH (ref 70–99)
GLUCOSE BLDC GLUCOMTR-MCNC: 150 MG/DL — HIGH (ref 70–99)
GLUCOSE BLDC GLUCOMTR-MCNC: 87 MG/DL — SIGNIFICANT CHANGE UP (ref 70–99)
INR BLD: 2.32 RATIO — HIGH (ref 0.88–1.16)
PROTHROM AB SERPL-ACNC: 26.9 SEC — HIGH (ref 10.5–13.4)

## 2022-11-10 PROCEDURE — 99232 SBSQ HOSP IP/OBS MODERATE 35: CPT

## 2022-11-10 RX ORDER — WARFARIN SODIUM 2.5 MG/1
5 TABLET ORAL ONCE
Refills: 0 | Status: COMPLETED | OUTPATIENT
Start: 2022-11-10 | End: 2022-11-10

## 2022-11-10 RX ORDER — BUMETANIDE 0.25 MG/ML
2 INJECTION INTRAMUSCULAR; INTRAVENOUS ONCE
Refills: 0 | Status: COMPLETED | OUTPATIENT
Start: 2022-11-10 | End: 2022-11-10

## 2022-11-10 RX ADMIN — INSULIN GLARGINE 12 UNIT(S): 100 INJECTION, SOLUTION SUBCUTANEOUS at 22:23

## 2022-11-10 RX ADMIN — AMIODARONE HYDROCHLORIDE 200 MILLIGRAM(S): 400 TABLET ORAL at 05:50

## 2022-11-10 RX ADMIN — ISOSORBIDE MONONITRATE 30 MILLIGRAM(S): 60 TABLET, EXTENDED RELEASE ORAL at 13:33

## 2022-11-10 RX ADMIN — WARFARIN SODIUM 5 MILLIGRAM(S): 2.5 TABLET ORAL at 22:25

## 2022-11-10 RX ADMIN — PANTOPRAZOLE SODIUM 40 MILLIGRAM(S): 20 TABLET, DELAYED RELEASE ORAL at 05:50

## 2022-11-10 RX ADMIN — LIDOCAINE 1 PATCH: 4 CREAM TOPICAL at 01:12

## 2022-11-10 RX ADMIN — BUMETANIDE 2 MILLIGRAM(S): 0.25 INJECTION INTRAMUSCULAR; INTRAVENOUS at 05:50

## 2022-11-10 RX ADMIN — BUMETANIDE 2 MILLIGRAM(S): 0.25 INJECTION INTRAMUSCULAR; INTRAVENOUS at 17:21

## 2022-11-10 RX ADMIN — LIDOCAINE 1 PATCH: 4 CREAM TOPICAL at 19:00

## 2022-11-10 RX ADMIN — LIDOCAINE 1 PATCH: 4 CREAM TOPICAL at 14:56

## 2022-11-10 RX ADMIN — SIMVASTATIN 10 MILLIGRAM(S): 20 TABLET, FILM COATED ORAL at 22:24

## 2022-11-10 RX ADMIN — SENNA PLUS 2 TABLET(S): 8.6 TABLET ORAL at 22:24

## 2022-11-10 RX ADMIN — Medication 5 MILLIGRAM(S): at 05:50

## 2022-11-10 NOTE — DISCHARGE NOTE PROVIDER - NSDCCPCAREPLAN_GEN_ALL_CORE_FT
PRINCIPAL DISCHARGE DIAGNOSIS  Diagnosis: Atrial flutter  Assessment and Plan of Treatment: You had a ELIZABETH and cardioversion to restore normal heart rythym and started on amiodirone.   Continue to take medications as prescribed by your doctor. Please follow up with your cardiologist 1 week after discharge for continued monitoring and management.       PRINCIPAL DISCHARGE DIAGNOSIS  Diagnosis: Atrial flutter  Assessment and Plan of Treatment: You had a ELIZABETH and cardioversion, currently junctional rhythm/ectopic atrial rhythm 50s. Continue Amiodarone 200 mg daily. You will need close follow up with pulmonolgist while on this medication  Please follow up with your cardiologist 1 week after discharge for continued monitoring and management.      SECONDARY DISCHARGE DIAGNOSES  Diagnosis: COPD without exacerbation  Assessment and Plan of Treatment: Call your Health Care provider upon arrival home to make a follow up appointment within one week.  Take all inhalers as prescribed by your Health Care Provider.  Take steroids as prescribed by your Health Care Provider.  If your cough increases infrequency and severity and/or you have shortness of breath or increased shortness of breath call your Health Care Provider.  If you develop fever, chills, night sweats, malaise, and/or change in mental status call your Health care Provider.  Nutrition is very important.  Eat small frequent meals.  Increase your activity as tolerated.  Do not stay in bed all day       PRINCIPAL DISCHARGE DIAGNOSIS  Diagnosis: Atrial flutter  Assessment and Plan of Treatment: You had a ELIZABETH and cardioversion, currently junctional rhythm/ectopic atrial rhythm 50s. Continue Amiodarone 200 mg daily. You will need close follow up with pulmonolgist while on this medication  Please follow up with your cardiologist 1 week after discharge for continued monitoring and management.      SECONDARY DISCHARGE DIAGNOSES  Diagnosis: COPD without exacerbation  Assessment and Plan of Treatment: Call your Health Care provider upon arrival home to make a follow up appointment within one week.  Take all inhalers as prescribed by your Health Care Provider.  Take steroids as prescribed by your Health Care Provider.  If your cough increases infrequency and severity and/or you have shortness of breath or increased shortness of breath call your Health Care Provider.  If you develop fever, chills, night sweats, malaise, and/or change in mental status call your Health care Provider.  Nutrition is very important.  Eat small frequent meals.  Increase your activity as tolerated.  Do not stay in bed all day      Diagnosis: Acute CHF  Assessment and Plan of Treatment: Weigh yourself daily.  If you gain 3lbs in 3 days, or 5lbs in a week call your Health Care Provider.  Do not eat or drink foods containing more than 2000mg of salt (sodium) in your diet every day.  Call your Health Care Provider if you have any swelling or increased swelling in your feet, ankles, and/or stomach.  Take all of your medication as directed.  If you become dizzy call your Health Care Provider.      Diagnosis: Anemia  Assessment and Plan of Treatment: Follow up with PMD for managment and repeat lab work    Diagnosis: Mechanical heart valve present  Assessment and Plan of Treatment: Mechanical mitral valve present  Continue coumadin . Keep INR 2.5-3.5    Diagnosis: Obstructive sleep apnea  Assessment and Plan of Treatment: Continue home bipap    Diagnosis: MISAEL (acute kidney injury)  Assessment and Plan of Treatment: Renal injury likely hemodynamically mediated in setting of aflutter and HF with recurrent MISAEL post-cardioversion with bradycardia. Scr improving. Continue with supportive care. UA bland with hyaline casts. FeNa elevated however drawn on diuretics (not accurate). CT without hydronephrosis. Avoid nephrotoxin

## 2022-11-10 NOTE — DISCHARGE NOTE PROVIDER - CARE PROVIDERS DIRECT ADDRESSES
,DirectAddress_Unknown ,DirectAddress_Unknown,[email or web address],DirectAddress_Unknown

## 2022-11-10 NOTE — PROGRESS NOTE ADULT - ASSESSMENT
72 year old Female with history of COPD, CHF presents with SOB. Nephrology consulted for elevated Scr.    1) MISAEL: likely hemodynamically mediated in setting of aflutter and HF with recurrent MISAEL post-cardioversion with bradycardia. Scr improving. Continue with supportive care. UA bland with hyaline casts. FeNa elevated however drawn on diuretics (not accurate). CT without hydronephrosis. Avoid nephrotoxins.    2) CKD-3b: Baseline Scr 1.4-1.6 with CKD likely due to DM. Outpatient CKD work up. Monitor electrolytes.    3) HTN with CKD: BP controlled. Bradycardia as per cardiology/EP. Monitor BP.    4) LE edema: Improving. Continue with bumex 2 mg PO daily. TTE with mild to moderate LVSD. Monitor UO.    5) Hypercalcemia: Secondary to primary HPT. Defer sensipar given resolution of hypercalcemia. Monitor serum calcium.    6) Anemia of renal disease: Hb low with iron deficiency s/p venofer. Monitor Hb.    7) L renal mass: Not visualized on CT. Outpatient Urology evaluation.      [facility] NEPHROLOGY  Rodrigue Amador M.D.  Rob Perez D.O.  Ana Song M.D.  Lucrecia López, MSN, ANP-C    Telephone: [phone]  Facsimile: [phone]    [address] [place] [id number]       72 year old Female with history of COPD, CHF presents with SOB. Nephrology consulted for elevated Scr.    1) MISAEL: likely hemodynamically mediated in setting of aflutter and HF with recurrent MISAEL post-cardioversion with bradycardia. Scr improving. Continue with supportive care. UA bland with hyaline casts. FeNa elevated however drawn on diuretics (not accurate). CT without hydronephrosis. Avoid nephrotoxins.    2) CKD-3b: Baseline Scr 1.4-1.6 with CKD likely due to DM. Outpatient CKD work up. Monitor electrolytes.    3) HTN with CKD: BP controlled. Bradycardia as per cardiology/EP. Monitor BP.    4) LE edema: Improving. Continue with bumex 2 mg PO daily and will give an extra 2 mg PO X 1 dose this evening. TTE with mild to moderate LVSD. Monitor UO.    5) Hypercalcemia: Secondary to primary HPT. Defer sensipar given resolution of hypercalcemia. Monitor serum calcium.    6) Anemia of renal disease: Hb low with iron deficiency s/p venofer. Monitor Hb.    7) L renal mass: Not visualized on CT. Outpatient Urology evaluation.      [facility] NEPHROLOGY  Rodrigue Amador M.D.  Rob Perez D.O.  Ana Song M.D.  Lucrecia López, MSN, ANP-C    Telephone: [phone]  Facsimile: [phone]    [address] [place] [address]

## 2022-11-10 NOTE — DISCHARGE NOTE PROVIDER - ATTENDING ATTESTATION STATEMENT
I have personally seen and examined the patient. I have collaborated with and supervised the pain is at 6 out of 10 when worst

## 2022-11-10 NOTE — PROGRESS NOTE ADULT - SUBJECTIVE AND OBJECTIVE BOX
DAMARI [id number]  72yFemale  T(C): 36.6 (11-10-22 @ 04:19), Max: 36.8 (11-09-22 @ 12:06)  HR: 54 (11-10-22 @ 04:19) (52 - 75)  BP: 146/73 (11-10-22 @ 04:19) (137/73 - 156/72)  RR: 18 (11-10-22 @ 04:19) (18 - 18)  SpO2: 97% (11-10-22 @ 04:19) (97% - 100%)  Wt(kg): --  11-08 @ 07:01  -  11-09 @ 07:00  --------------------------------------------------------  IN: 1260 mL / OUT: 1900 mL / NET: -640 mL    11-09 @ 07:01  -  11-10 @ 05:48  --------------------------------------------------------  IN: 600 mL / OUT: 1250 mL / NET: -650 mL      normal cephalic atraumatic  s1s2   clear to ascultation bilaterally  soft, non tender, non distended no guarding or rebound  no clubbing cyanosis or edema

## 2022-11-10 NOTE — PROGRESS NOTE ADULT - SUBJECTIVE AND OBJECTIVE BOX
24H hour events: No acute events    MEDICATIONS:  aMIOdarone    Tablet 200 milliGRAM(s) Oral daily  buMETAnide 2 milliGRAM(s) Oral daily  isosorbide   mononitrate ER Tablet (IMDUR) 30 milliGRAM(s) Oral daily  ALBUTerol    90 MICROgram(s) HFA Inhaler 2 Puff(s) Inhalation every 6 hours PRN  acetaminophen     Tablet .. 650 milliGRAM(s) Oral every 6 hours PRN  melatonin 3 milliGRAM(s) Oral at bedtime PRN  pantoprazole    Tablet 40 milliGRAM(s) Oral before breakfast  polyethylene glycol 3350 17 Gram(s) Oral daily  senna 2 Tablet(s) Oral at bedtime  insulin glargine Injectable (LANTUS) 12 Unit(s) SubCutaneous at bedtime  insulin lispro (ADMELOG) corrective regimen sliding scale   SubCutaneous at bedtime  insulin lispro (ADMELOG) corrective regimen sliding scale   SubCutaneous three times a day before meals  predniSONE   Tablet 5 milliGRAM(s) Oral daily  simvastatin 10 milliGRAM(s) Oral at bedtime  lidocaine   4% Patch 1 Patch Transdermal daily      REVIEW OF SYSTEMS:  Complete 12 point ROS negative.    PHYSICAL EXAM:  T(C): 36.6 (11-10-22 @ 04:19), Max: 36.8 (11-09-22 @ 12:06)  HR: 58 (11-10-22 @ 06:07) (52 - 75)  BP: 146/73 (11-10-22 @ 04:19) (137/73 - 156/72)  RR: 18 (11-10-22 @ 04:19) (18 - 18)  SpO2: 95% (11-10-22 @ 06:07) (95% - 100%)  Wt(kg): --  I&O's Summary    09 Nov 2022 07:01  -  10 Nov 2022 07:00  --------------------------------------------------------  IN: 600 mL / OUT: 1250 mL / NET: -650 mL        Appearance: Normal	  HEENT:  PERRL, EOMI	  Cardiovascular: Normal S1 S2, ofelia, No JVD, No murmurs, No edema  Respiratory: Lungs clear to auscultation	  Psychiatry: A & O x 3, Mood & affect appropriate  Gastrointestinal:  Soft, Non-tender, + BS	  Skin: No rashes, No ecchymoses, No cyanosis	  Neurologic: Non-focal  Extremities: No clubbing, cyanosis or edema  Vascular: Peripheral pulses palpable 2+ bilaterally        TELEMETRY: Ectopic atrial rhythm 50s      < from: TTE with Doppler (w/Cont) (10.23.22 @ 11:04) >  PROCEDURE: Transthoracic echocardiogram with 2-D, M-Mode  and complete spectral and colorflow Doppler. Verbal  consent was obtained for injection of  Ultrasonic Enhancing  Agent following a discussion of risks and benefits.  Following intravenous injection of Ultrasonic Enhancing  Agent, harmonic imaging was performed.  INDICATION: Abnormal electrocardiogram (ECG) (EKG) (R94.31)  ------------------------------------------------------------------------  CONCLUSIONS:  Technically very difficult study.  1. Mechanical prosthetic mitral valve replacement, which  was poorly visualized.  2.Endocardium not well visualized; grossly mild to  moderate global left ventricular systolic dysfunction.  Endocardial visualization enhanced with intravenous  injection of Ultrasonic Enhancing Agent (Definity).  3. The right ventricle is not well visualized.    < end of copied text >  	     24H hour events: No acute events    MEDICATIONS:  aMIOdarone    Tablet 200 milliGRAM(s) Oral daily  buMETAnide 2 milliGRAM(s) Oral daily  isosorbide   mononitrate ER Tablet (IMDUR) 30 milliGRAM(s) Oral daily  ALBUTerol    90 MICROgram(s) HFA Inhaler 2 Puff(s) Inhalation every 6 hours PRN  acetaminophen     Tablet .. 650 milliGRAM(s) Oral every 6 hours PRN  melatonin 3 milliGRAM(s) Oral at bedtime PRN  pantoprazole    Tablet 40 milliGRAM(s) Oral before breakfast  polyethylene glycol 3350 17 Gram(s) Oral daily  senna 2 Tablet(s) Oral at bedtime  insulin glargine Injectable (LANTUS) 12 Unit(s) SubCutaneous at bedtime  insulin lispro (ADMELOG) corrective regimen sliding scale   SubCutaneous at bedtime  insulin lispro (ADMELOG) corrective regimen sliding scale   SubCutaneous three times a day before meals  predniSONE   Tablet 5 milliGRAM(s) Oral daily  simvastatin 10 milliGRAM(s) Oral at bedtime  lidocaine   4% Patch 1 Patch Transdermal daily  Coumadin by level    REVIEW OF SYSTEMS: Complete 12 point ROS negative.    PHYSICAL EXAM:  T(C): 36.6 (11-10-22 @ 04:19), Max: 36.8 (11-09-22 @ 12:06)  HR: 58 (11-10-22 @ 06:07) (52 - 75)  BP: 146/73 (11-10-22 @ 04:19) (137/73 - 156/72)  RR: 18 (11-10-22 @ 04:19) (18 - 18)  SpO2: 95% (11-10-22 @ 06:07) (95% - 100%)    I&O's Summary    09 Nov 2022 07:01  -  10 Nov 2022 07:00  --------------------------------------------------------  IN: 600 mL / OUT: 1250 mL / NET: -650 mL    Appearance: Normal	  HEENT:  PERRL, EOMI	  Cardiovascular: Normal S1 S2, ofelia, No JVD, No murmurs, No edema  Respiratory: Lungs clear to auscultation	  Psychiatry: A & O x 3, Mood & affect appropriate  Gastrointestinal:  Soft, Non-tender, + BS	  Skin: No rashes, No ecchymoses, No cyanosis	  Neurologic: Non-focal  Extremities: No clubbing, cyanosis or edema  Vascular: Peripheral pulses palpable 2+ bilaterally    TELEMETRY: Ectopic atrial rhythm 50s      < from: TTE with Doppler (w/Cont) (10.23.22 @ 11:04) > CONCLUSIONS: Technically very difficult study. 1. Mechanical prosthetic mitral valve replacement, which was poorly visualized. 2.Endocardium not well visualized; grossly mild to moderate global left ventricular systolic dysfunction. Endocardial visualization enhanced with intravenous injection of Ultrasonic Enhancing Agent (Definity). 3. The right ventricle is not well visualized.  < end of copied text >

## 2022-11-10 NOTE — DISCHARGE NOTE PROVIDER - NSDCMRMEDTOKEN_GEN_ALL_CORE_FT
alcohol swabs : Apply topically to affected area 4 times a day   budesonide-formoterol 160 mcg-4.5 mcg/inh inhalation aerosol: 2 puff(s) inhaled 2 times a day   bumetanide 1 mg oral tablet: 2 tab(s) orally 2 times a day   ferrous sulfate 324 mg (65 mg elemental iron) oral tablet: 1 tab(s) orally once a day  glucometer (per patient&#x27;s insurance): Test blood sugars four times a day. Dispense #1 glucometer.  hydrALAZINE 10 mg oral tablet: 1 tab(s) orally 3 times a day  Insulin Pen Needles, 4mm: 1 application subcutaneously 4 times a day. ** Use with insulin pen **   lancets: 1 application subcutaneously 4 times a day   Levemir FlexTouch 100 units/mL subcutaneous solution: 25 unit(s) subcutaneous once a day (at bedtime)   lidocaine 4% topical film: Apply topically to affected area once a day  meclizine 12.5 mg oral tablet: 1 tab(s) orally 3 times a day  metoprolol succinate 50 mg oral tablet, extended release: 1 tab(s) orally once a day  NovoLOG FlexPen 100 units/mL injectable solution: 10 unit(s) injectable 3 times a day (before meals)   ProAir HFA 90 mcg/inh inhalation aerosol: 2 puff(s) inhaled every 6 hours  Protonix 40 mg oral delayed release tablet: 1 tab(s) orally once a day   simvastatin 10 mg oral tablet: 1 tab(s) orally once a day (at bedtime)   alcohol swabs : Apply topically to affected area 4 times a day   amiodarone 200 mg oral tablet: 1 tab(s) orally once a day MDD:1  budesonide-formoterol 160 mcg-4.5 mcg/inh inhalation aerosol: 2 puff(s) inhaled 2 times a day   bumetanide 1 mg oral tablet: 2 tab(s) orally once a day   ferrous sulfate 324 mg (65 mg elemental iron) oral tablet: 1 tab(s) orally once a day  glucometer (per patient&#x27;s insurance): Test blood sugars four times a day. Dispense #1 glucometer.  Insulin Pen Needles, 4mm: 1 application subcutaneously 4 times a day. ** Use with insulin pen **   isosorbide mononitrate 30 mg oral tablet, extended release: 1 tab(s) orally once a day MDD:1  lancets: 1 application subcutaneously 4 times a day   Levemir FlexTouch 100 units/mL subcutaneous solution: 25 unit(s) subcutaneous once a day (at bedtime)   lidocaine 4% topical film: Apply topically to affected area once a day  NovoLOG FlexPen 100 units/mL injectable solution: 10 unit(s) injectable 3 times a day (before meals)   predniSONE 5 mg oral tablet: 1 tab(s) orally once a day MDD:1  ProAir HFA 90 mcg/inh inhalation aerosol: 2 puff(s) inhaled every 6 hours  Protonix 40 mg oral delayed release tablet: 1 tab(s) orally once a day   simvastatin 10 mg oral tablet: 1 tab(s) orally once a day (at bedtime)   alcohol swabs : Apply topically to affected area 4 times a day   amiodarone 200 mg oral tablet: 1 tab(s) orally once a day MDD:1  budesonide-formoterol 160 mcg-4.5 mcg/inh inhalation aerosol: 2 puff(s) inhaled 2 times a day   bumetanide 1 mg oral tablet: 2 tab(s) orally once a day   ferrous sulfate 324 mg (65 mg elemental iron) oral tablet: 1 tab(s) orally once a day  glucometer (per patient&#x27;s insurance): Test blood sugars four times a day. Dispense #1 glucometer.  Insulin Pen Needles, 4mm: 1 application subcutaneously 4 times a day. ** Use with insulin pen **   isosorbide mononitrate 30 mg oral tablet, extended release: 1 tab(s) orally once a day MDD:1  lancets: 1 application subcutaneously 4 times a day   Levemir FlexTouch 100 units/mL subcutaneous solution: 25 unit(s) subcutaneous once a day (at bedtime)   lidocaine 4% topical film: Apply topically to affected area once a day  NovoLOG FlexPen 100 units/mL injectable solution: 10 unit(s) injectable 3 times a day (before meals)   predniSONE 5 mg oral tablet: 1 tab(s) orally once a day MDD:1  ProAir HFA 90 mcg/inh inhalation aerosol: 2 puff(s) inhaled every 6 hours  Protonix 40 mg oral delayed release tablet: 1 tab(s) orally once a day   simvastatin 10 mg oral tablet: 1 tab(s) orally once a day (at bedtime)  warfarin 4 mg oral tablet: 1 tab(s) orally once a day MDD:1   alcohol swabs : Apply topically to affected area 4 times a day   amiodarone 200 mg oral tablet: 1 tab(s) orally once a day MDD:1  budesonide-formoterol 160 mcg-4.5 mcg/inh inhalation aerosol: 2 puff(s) inhaled 2 times a day   bumetanide 1 mg oral tablet: 2 tab(s) orally once a day   ferrous sulfate 324 mg (65 mg elemental iron) oral tablet: 1 tab(s) orally once a day  glucometer (per patient&#x27;s insurance): Test blood sugars four times a day. Dispense #1 glucometer.  Insulin Pen Needles, 4mm: 1 application subcutaneously 4 times a day. ** Use with insulin pen **   isosorbide mononitrate 30 mg oral tablet, extended release: 1 tab(s) orally once a day MDD:1  lancets: 1 application subcutaneously 4 times a day   Levemir FlexTouch 100 units/mL subcutaneous solution: 25 unit(s) subcutaneous once a day (at bedtime)   lidocaine 4% topical film: Apply topically to affected area once a day  NovoLOG FlexPen 100 units/mL injectable solution: 10 unit(s) injectable 3 times a day (before meals)   predniSONE 5 mg oral tablet: 1 tab(s) orally once a day MDD:1  ProAir HFA 90 mcg/inh inhalation aerosol: 2 puff(s) inhaled every 6 hours  Protonix 40 mg oral delayed release tablet: 1 tab(s) orally once a day   simvastatin 10 mg oral tablet: 1 tab(s) orally once a day (at bedtime)  warfarin 3 mg oral tablet: 1 tab(s) orally once a day starting 11/17/22  warfarin 5 mg oral tablet: 1 tab(s) orally once  on 11/16/22

## 2022-11-10 NOTE — PROGRESS NOTE ADULT - SUBJECTIVE AND OBJECTIVE BOX
PATIENT SEEN AND EXAMINED ON :- 11/10/22  DATE OF SERVICE:   11/10/22          Interim events noted,Labs ,Radiological studies and Cardiology tests reviewed .       HOSPITAL COURSE: HPI:  71 y/o F with pmhx of COPD on home O2(3L) with severe pHTN, MVR/TVR , AF s/p ablation,  OHS/MIGUEL on home biPAP, HTN, HLD who presents with one week of vomiting with poor oral intake also complaining of SOB and chest pain.  She also states she has been taking prednisone on/off for the past 3 months for chest tightness and does endorse some weight gain.     In the ED pt found to have A Flutter and given Amio 400 po and Dig loaded.  (23 Oct 2022 04:23)      INTERIM EVENTS:Patient seen at bedside ,interim events noted.      PMH -reviewed admission note, no change since admission  HEART FAILURE: Acute[ ]Chronic[ ] Systolic[ ] Diastolic[ ] Combined Systolic and Diastolic[ ]  CAD[ ] CABG[ ] PCI[ ]  DEVICES[ ] PPM[ ] ICD[ ] ILR[ ]  ATRIAL FIBRILLATION[ ] Paroxysmal[ ] Permanent[ ] CHADS2-[  ]  MISAEL[ ] CKD1[ ] CKD2[ ] CKD3[ ] CKD4[ ] ESRD[ ]  COPD[ ] HTN[ ]   DM[ ] Type1[ ] Type 2[ ]   CVA[ ] Paresis[ ]    AMBULATION: Assisted[ ] Cane/walker[ ] Independent[ ]    MEDICATIONS  (STANDING):  aMIOdarone    Tablet 200 milliGRAM(s) Oral daily  buMETAnide 2 milliGRAM(s) Oral daily  insulin glargine Injectable (LANTUS) 12 Unit(s) SubCutaneous at bedtime  insulin lispro (ADMELOG) corrective regimen sliding scale   SubCutaneous at bedtime  insulin lispro (ADMELOG) corrective regimen sliding scale   SubCutaneous three times a day before meals  isosorbide   mononitrate ER Tablet (IMDUR) 30 milliGRAM(s) Oral daily  lidocaine   4% Patch 1 Patch Transdermal daily  pantoprazole    Tablet 40 milliGRAM(s) Oral before breakfast  polyethylene glycol 3350 17 Gram(s) Oral daily  predniSONE   Tablet 5 milliGRAM(s) Oral daily  senna 2 Tablet(s) Oral at bedtime  simvastatin 10 milliGRAM(s) Oral at bedtime  warfarin 5 milliGRAM(s) Oral once    MEDICATIONS  (PRN):  acetaminophen     Tablet .. 650 milliGRAM(s) Oral every 6 hours PRN Temp greater or equal to 38C (100.4F), Mild Pain (1 - 3)  ALBUTerol    90 MICROgram(s) HFA Inhaler 2 Puff(s) Inhalation every 6 hours PRN Bronchospasm  melatonin 3 milliGRAM(s) Oral at bedtime PRN Insomnia      REVIEW OF SYSTEMS:  Constitutional: [ ] fever, [ ]weight loss,  [ ]fatigue [ ]weight gain  Eyes: [ ] visual changes  Respiratory: [ ]shortness of breath;  [ ] cough, [ ]wheezing, [ ]chills, [ ]hemoptysis  Cardiovascular: [ ] chest pain, [ ]palpitations, [ ]dizziness,  [ ]leg swelling[ ]orthopnea[ ]PND  Gastrointestinal: [ ] abdominal pain, [ ]nausea, [ ]vomiting,  [ ]diarrhea [ ]Constipation [ ]Melena  Genitourinary: [ ] dysuria, [ ] hematuria [ ]Junior  Neurologic: [ ] headaches [ ] tremors[ ]weakness [ ]Paralysis Right[ ] Left[ ]  Skin: [ ] itching, [ ]burning, [ ] rashes  Endocrine: [ ] heat or cold intolerance  Musculoskeletal: [ ] joint pain or swelling; [ ] muscle, back, or extremity pain  Psychiatric: [ ] depression, [ ]anxiety, [ ]mood swings, or [ ]difficulty sleeping  Hematologic: [ ] easy bruising, [ ] bleeding gums    [ ] All remaining systems negative except as per above.   [ ]Unable to obtain.  [x] No change in ROS since admission      Vital Signs Last 24 Hrs  T(C): 36.4 (10 Nov 2022 11:11), Max: 36.6 (09 Nov 2022 21:14)  T(F): 97.6 (10 Nov 2022 11:11), Max: 97.8 (09 Nov 2022 21:14)  HR: 54 (10 Nov 2022 15:04) (52 - 60)  BP: 119/78 (10 Nov 2022 15:04) (119/78 - 146/73)  BP(mean): --  RR: 18 (10 Nov 2022 04:19) (18 - 18)  SpO2: 98% (10 Nov 2022 15:04) (95% - 99%)    Parameters below as of 10 Nov 2022 04:19  Patient On (Oxygen Delivery Method): nasal cannula      I&O's Summary    09 Nov 2022 07:01  -  10 Nov 2022 07:00  --------------------------------------------------------  IN: 600 mL / OUT: 1250 mL / NET: -650 mL    10 Nov 2022 07:01  -  10 Nov 2022 19:58  --------------------------------------------------------  IN: 480 mL / OUT: 0 mL / NET: 480 mL        PHYSICAL EXAM:  General: No acute distress BMI-  HEENT: EOMI, PERRL  Neck: Supple, [ ] JVD  Lungs: Equal air entry bilaterally; [ ] rales [ ] wheezing [ ] rhonchi  Heart: Regular rate and rhythm; [x ] murmur   2/6 [ x] systolic [ ] diastolic [ ] radiation[ ] rubs [ ]  gallops  Abdomen: Nontender, bowel sounds present  Extremities: No clubbing, cyanosis, [ ] edema [ ]Pulses  equal and intact  Nervous system:  Alert & Oriented X3, no focal deficits  Psychiatric: Normal affect  Skin: No rashes or lesions    LABS:        Creatinine Trend: 2.47<--, 2.83<--, 3.24<--, 3.34<--, 2.85<--, 2.30<--    PT/INR - ( 10 Nov 2022 10:28 )   PT: 26.9 sec;   INR: 2.32 ratio           Patient name: DAMARI GUERRERO  YOB: 1950   Age: 72 (F)   MR#: [id number]  Study Date: 11/2/2022  [id number] Khai Gordon MD  Study quality: Technically good  Referring Physician: Renny Newman MD  Blood Pressure: 160/92 mmHg  Height: 163 cm  Weight: 116 kg  BSA: 2.2 m2  Heart Rate: 95 mmHg  ------------------------------------------------------------------------  PROCEDURE: Transesophageal (ELIZABETH) was performed.  Informed  consent was first obtained for ELIZABETH. The patient was sedated  - see anesthesia record.  The procedure was monitored with  automatic blood pressure monitoring, ECG tracings and pulse  oximetry. The transesophageal probe was placed in the  esophagus posterior to the heart without complications.  INDICATION: Unspecified atrial fibrillation (I48.91)  ------------------------------------------------------------------------  Dimensions:    Normal Values:  LA:            2.0 - 4.0 cm  Ao:            2.0 - 3.8 cm  SEPTUM:    0.6 - 1.2 cm  PWT:           0.6 - 1.1 cm  LVIDd:         3.0 - 5.6 cm  LVIDs:         1.8 - 4.0 cm  EF (Visual Estimate): na %  ------------------------------------------------------------------------  Observations:  Mitral Valve: Mechanical prosthetic mitral valve  replacement. The mechanical leaflets are opening well.  Aortic Valve/Aorta:  Simple atheroma noted in aortic arch/descending aorta.  Left Atrium: No left atrium or left atrial appendage  thrombus.  Left Ventricle: Endocardium notwell visualized; unable to  evaluate left ventricular systolic function.  Pericardium/Pleura: Normal pericardium with no pericardial  effusion.  ------------------------------------------------------------------------  Conclusions:  1. Mechanical prosthetic mitral valve replacement. The  mechanical leaflets are opening well.  2. No left atrium or left atrial appendage thrombus.  ------------------------------------------------------------------------  Confirmed on  11/4/2022 - 17:35:48 by Stu Domínguez M.D.  ------------------------------------------------------------------------

## 2022-11-10 NOTE — DISCHARGE NOTE PROVIDER - NSDCFUSCHEDAPPT_GEN_ALL_CORE_FT
[facility]  [facility] [address] [facility]  Scheduled Appointment: 11/14/2022

## 2022-11-10 NOTE — DISCHARGE NOTE PROVIDER - NSDCFUADDAPPT_GEN_ALL_CORE_FT
APPTS ARE READY TO BE MADE: [x ] YES    Additional Information about above appointments (if needed):    1:   2:   3:     Other comments or requests:    APPTS ARE READY TO BE MADE: [x ] YES    Additional Information about above appointments (if needed):    1: PMD  2:   3:     Other comments or requests:    APPTS ARE READY TO BE MADE: [x ] YES    Additional Information about above appointments (if needed):    1: PMD  2: Follow up with Dr. Manning or Dr. Fuad Vann in 3 days to check Coumadin level. INR Goal 2.5-3.5 for mechanical valve.   3:     Other comments or requests:    APPTS ARE READY TO BE MADE: [x ] YES    Additional Information about above appointments (if needed):    1: PMD  2: Follow up with Dr. Manning or Dr. Fuad Vann in 3 days to check Coumadin level. INR Goal 2.5-3.5 for mechanical valve.   3: Will need to follow up outpt with Urology to evaluate left renal mass -- None seen on CT Scan done inpatient.     Other comments or requests:    APPTS ARE READY TO BE MADE: [x ] YES    Additional Information about above appointments (if needed):    1: PMD  2: Follow up with Dr. Manning or Dr. Fuad Vann on 11/18/22 for INR check. . INR Goal 2.5-3.5 for mechanical valve.   3: Will need to follow up outpt with Urology to evaluate left renal mass -- None seen on CT Scan done inpatient.     Other comments or requests:

## 2022-11-10 NOTE — PROVIDER CONTACT NOTE (OTHER) - REASON
PTT: 77.5
bradycardia 39/junctional rhythm
Aflutter for 15 seconds on cardiac monitor HR up to 130's

## 2022-11-10 NOTE — PROGRESS NOTE ADULT - ASSESSMENT
73yo female with a PMH of COPD on home O2(3L) with severe pHTN, MVR/TVR , AF s/p ablation,  OHS/MIGUEL on home BiPAP, HTN, HLD who initially presented to the ED as a transfer from [facility] for AFL s/p Amiodarone initiation. EP was re-consulted due to an episode of AFL in the 130's and due to concern for continued Amiodarone use given history of COPD and long term medication toxicity.    1. AFL s/p ELIZABETH/DCCV  2. Ectopic atrial rhythm    - s/p ELIZABETH/DCCV 11/4/22, currently junctional rhythm/ectopic atrial rhythm 50s  - Continue Amiodarone 200 mg daily, will need close F/U of pulmonary status but okay to continue with underlying COPD   - Continue to dose Coumadin, INR today pending   - HRs stable, will monitor off of Diltiazem and Metoprolol; no plan for PPM at this time  - Nephrology following for MISAEL on CKD  - Close telemetry monitoring  - Maintain K>4.0 and Mg>2.0 73yo female with a PMH of COPD on home O2(3L) with severe pHTN, MVR/TVR , AF s/p ablation,  OHS/MIGUEL on home BiPAP, HTN, HLD who initially presented to the ED as a transfer from [facility] for AFL s/p Amiodarone initiation. EP was re-consulted due to an episode of AFL in the 130's and due to concern for continued Amiodarone use given history of COPD and long term medication toxicity.    1. AFL s/p ELIZABETH/DCCV  2. Ectopic atrial rhythm    - s/p ELIZABETH/DCCV 11/4/22, currently junctional rhythm/ectopic atrial rhythm 50s  - Continue Amiodarone 200 mg daily, will need close F/U of pulmonary status but okay to continue with underlying COPD   - Continue to dose Coumadin, INR 2.32 today   - HRs stable with appropriate increase to SR 100s with ambulation, will monitor off of Diltiazem and Metoprolol; no plan for PPM at this time  - Nephrology following for MISAEL on CKD  - Close telemetry monitoring  - Maintain K>4.0 and Mg>2.0  - No EP contraindication to discharge  - Discussed with team  - EP to sign off, reconsult as needed 73yo female with a PMH of COPD (on 3L home O2), severe pHTN, prior MVR/TVR , AF (s/p ablation), OHS/MIGUEL (on home BiPAP), HTN, HLD. She initially presented to the ED as a transfer from [facility] for AFl. She had been treated with amiodarone. She underwent a ELIZABETH/DCCV on 11/4/2022 and has been in a junctional rhythm / ectopic atrial rhythm since. Her Amiodarone dose has been decreased.     1. AFl s/p ELIZABETH/DCCV  2. Ectopic atrial rhythm    - s/p ELIZABETH/DCCV 11/4/22, currently junctional rhythm/ectopic atrial rhythm 50s  - Continue Amiodarone 200 mg daily, will need close F/U of pulmonary status while on Amiodarone given COPD   - Continue to dose Coumadin by level, INR 2.32 today   - HRs stable with appropriate increase to SR 100s with ambulation, will monitor off of Diltiazem and Metoprolol; no plan for PPM at this time  - Nephrology following for MISAEL on CKD  - Close telemetry monitoring  - Maintain K>4.0 and Mg>2.0  - No EP contraindication to discharge  - Discussed with team  - EP to sign off, reconsult as needed

## 2022-11-10 NOTE — PROGRESS NOTE ADULT - ASSESSMENT
71 y/o F with pmhx of COPD on home O2(3L) with severe pHTN, MVR/TVR , AF s/p ablation,  OHS/MIGUEL on home biPAP, HTN, HLD who presents with one week of vomiting with poor oral intake also complaining of SOB and chest pain.  She also states she has been taking prednisone on/off for the past 3 months for chest tightness and does endorse some weight gain.     In the ED pt found to have A Flutter and given Amio 400 po and Dig loaded.     #PULM  - Currently on home 3-4 L NC, No exp wheezing   - CXR likely fluid OL  - Continue proair inhalation  - c/w prednisone 5mg since on chronically for 3 months    AFLutter  -  s/p ELIZABETH/DCCV 11/4/22, currently junctional rhythm/ectopic atrial rhythm 40s - patient reports feeling ?weak  - Discontinue Metoprolol and Cardizem  - Adjust Amiodarone load to 200 mg BID for now  -as per EP , no PPM at present , pt can be dced   MVR/TV replacement off AC  - echo technically difficult, unable to assess due to habitus and positioning  - per chart review under last name Nancy MRN [id number] - mechanical valve replacement 1999, was previously on coumadin but stopped in 09/2021 after worsening anemia, concern for GI bleed & epistaxis.   - continue with ac  -  ENDO  - A1c 10.7  - FS ~100-130  -adjust insulin for optimal blood sugar control    Hyperkalemia- Kayexalate and f/u k

## 2022-11-10 NOTE — PROGRESS NOTE ADULT - SUBJECTIVE AND OBJECTIVE BOX
SUBJECTIVE / OVERNIGHT EVENTS:pt seen and examined    MEDICATIONS  (STANDING):  aMIOdarone    Tablet 200 milliGRAM(s) Oral daily  buMETAnide 2 milliGRAM(s) Oral daily  insulin glargine Injectable (LANTUS) 12 Unit(s) SubCutaneous at bedtime  insulin lispro (ADMELOG) corrective regimen sliding scale   SubCutaneous at bedtime  insulin lispro (ADMELOG) corrective regimen sliding scale   SubCutaneous three times a day before meals  isosorbide   mononitrate ER Tablet (IMDUR) 30 milliGRAM(s) Oral daily  lidocaine   4% Patch 1 Patch Transdermal daily  pantoprazole    Tablet 40 milliGRAM(s) Oral before breakfast  polyethylene glycol 3350 17 Gram(s) Oral daily  predniSONE   Tablet 5 milliGRAM(s) Oral daily  senna 2 Tablet(s) Oral at bedtime  simvastatin 10 milliGRAM(s) Oral at bedtime    MEDICATIONS  (PRN):  acetaminophen     Tablet .. 650 milliGRAM(s) Oral every 6 hours PRN Temp greater or equal to 38C (100.4F), Mild Pain (1 - 3)  ALBUTerol    90 MICROgram(s) HFA Inhaler 2 Puff(s) Inhalation every 6 hours PRN Bronchospasm  melatonin 3 milliGRAM(s) Oral at bedtime PRN Insomnia    Vital Signs Last 24 Hrs  T(C): 36.3 (11-10-22 @ 20:32), Max: 36.6 (11-10-22 @ 04:19)  T(F): 97.3 (11-10-22 @ 20:32), Max: 97.8 (11-10-22 @ 04:19)  HR: 58 (11-10-22 @ 20:32) (52 - 60)  BP: 113/50 (11-10-22 @ 20:32) (113/50 - 146/73)  BP(mean): --  RR: 18 (11-10-22 @ 20:32) (18 - 18)  SpO2: 97% (11-10-22 @ 20:32) (95% - 99%)            Skin: No rash.  Eyes: No recent vision problems or eye pain.  ENT: No congestion, ear pain, or sore throat.  Cardiovascular: No chest pain or palpation.  Respiratory: No cough, shortness of breath, congestion, or wheezing.  Gastrointestinal: No abdominal pain, nausea, vomiting, or diarrhea.  Genitourinary: No dysuria.  Musculoskeletal: No joint swelling.  Neurologic: No headache.    PHYSICAL EXAM:  GENERAL: NAD  EYES: EOMI, PERRLA  NECK: Supple, No JVD  CHEST/LUNG: dec breath sounds at bases  HEART:  S1 , S2 +  ABDOMEN: soft , bs+  EXTREMITIES:  edema+  NEUROLOGY:alert awake oriented    LABS:        Creatinine Trend: 2.47 <--, 2.83 <--, 3.24 <--, 3.34 <--, 2.85 <--, 2.30 <--    Urine Studies:  Urinalysis Basic - ( 2022 03:01 )    Color: Light Yellow / Appearance: Clear / S.012 / pH:   Gluc:  / Ketone: Negative  / Bili: Negative / Urobili: Negative   Blood:  / Protein: Negative / Nitrite: Negative   Leuk Esterase: Negative / RBC: 0 /hpf / WBC 1 /HPF   Sq Epi:  / Non Sq Epi: 1 /hpf / Bacteria: Negative      Sodium, Random Urine: 86 mmol/L ( @ 03:02)  Creatinine, Random Urine: 51 mg/dL ( @ 03:02)            PT/INR - ( 10 Nov 2022 10:28 )   PT: 26.9 sec;   INR: 2.32 ratio                   PT/INR - ( 2022 09:57 )   PT: 26.2 sec;   INR: 2.24 ratio

## 2022-11-10 NOTE — PROVIDER CONTACT NOTE (OTHER) - BACKGROUND
Pt admitted for new aflutter.
admitted for A Flutter s/p  successful cardioversion
Patient admitted for SOB, CP, new Aflutter.  PMH of CHF, HTN, COPD, mechanical MVR, HLD

## 2022-11-10 NOTE — PROGRESS NOTE ADULT - NS ATTEND AMEND GEN_ALL_CORE FT
Patient seen on 3 DSU. We asked that the patient be ambulated today. Her HR increased to the 100s with ambulation. She is asymptomatic at rest. No plan for PPM at this time. Continue with Coumadin and Amiodarone. Needs close monitoring of PFTs.    MARIA DEL CARMEN Palmer
seen and agree  with junctional rhythm would decrease amiodarone to 200mg/day

## 2022-11-10 NOTE — PROGRESS NOTE ADULT - ASSESSMENT
73 y/o F with pmhx of COPD on home O2(3L) with severe pHTN, MVR/TVR , AF s/p ablation,  OHS/MIGUEL on home biPAP, HTN, HLD who presents with one week of vomiting with poor oral intake also complaining of SOB and chest pain.  She also states she has been taking prednisone on/off for the past 3 months for chest tightness and does endorse some weight gain.         #PULM  - Currently on home 3-4 L NC, No exp wheezing   - CXR likely fluid OL  - Continue proair inhalation  - c/w prednisone 5mg since on chronically for 3 months    AFLutter  -  s/p ELIZABETH/DCCV 11/4/22, currently junctional rhythm/ectopic atrial rhythm 40s - patient reports feeling ?weak  - Discontinue Metoprolol and Cardizem  - Adjust Amiodarone load to 200 mg BID for now  - s/p ELIZABETH/DCCV 11/4/22, currently junctional rhythm/ectopic atrial rhythm 50s - patient reports chronic dyspnea but no light-headedness, chest pain, palpitations   - Currently on Amiodarone 200 mg BID, will need close F/U of pulmonary status but okay to continue with underlying COPD   - Continue to dose Coumadin, INR today pending   - HRs improving, will monitor off of Diltiazem and Metoprolol; no plan for PPM at this time    MVR/TV replacement off AC  - echo technically difficult, unable to assess due to habitus and positioning  - per chart review under last name Nancy MRN [id number] - mechanical valve replacement 1999, was previously on coumadin but stopped in 09/2021 after worsening anemia, concern for GI bleed & epistaxis.   - continue with ac  -  ENDO  - A1c 10.7  - FS ~100-130  - Continue home Lantus and premeal, adjust as appropriate

## 2022-11-10 NOTE — PROVIDER CONTACT NOTE (OTHER) - ASSESSMENT
Pt A&Ox4, and denies SOB, chest pain or dizziness. Vitals taken as follows: /78, HR 54, SpO2 98%, RR16.
A&O x4.  On patient-specific heparin gtt at 8.5 cc/hr.  No s/s of bleeding.  Goal PTT: 55-90
asymptomatic, on  and off Bradycardia . lowest Hr at 38

## 2022-11-10 NOTE — PROGRESS NOTE ADULT - SUBJECTIVE AND OBJECTIVE BOX
[facility] NEPHROLOGY- PROGRESS NOTE    72 year old Female with history of COPD, CHF presents with SOB. Nephrology consulted for elevated Scr.    REVIEW OF SYSTEMS:  Gen: no fevers  Cards: no chest pain  Resp: + dyspnea with exertion improving  GI: no nausea or vomiting or diarrhea  Vascular: + LE edema improving    No Known Allergies      Hospital Medications: Medications reviewed      VITALS:  T(F): 97.6 (11-10-22 @ 11:11), Max: 97.8 (22 @ 21:14)  HR: 55 (11-10-22 @ 11:11)  BP: 128/65 (11-10-22 @ 11:11)  RR: 18 (11-10-22 @ 04:19)  SpO2: 99% (11-10-22 @ 11:11)  Wt(kg): --     @ 07:01  -  11-10 @ 07:00  --------------------------------------------------------  IN: 600 mL / OUT: 1250 mL / NET: -650 mL    11-10 @ 07:01  -  11-10 @ 14:43  --------------------------------------------------------  IN: 480 mL / OUT: 0 mL / NET: 480 mL        PHYSICAL EXAM:    Gen: NAD, calm  Cards: Irregularly irregular, +S1/S2, no M/G/R  Resp: CTA B/L  GI: soft, NT/ND, NABS  Vascular: 1+ LE edema B/L      LABS:        Creatinine Trend: 2.47 <--, 2.83 <--, 3.24 <--, 3.34 <--, 2.85 <--, 2.30 <--, 2.36 <--    Urine Studies:  Urinalysis Basic - ( 2022 03:01 )    Color: Light Yellow / Appearance: Clear / S.012 / pH:   Gluc:  / Ketone: Negative  / Bili: Negative / Urobili: Negative   Blood:  / Protein: Negative / Nitrite: Negative   Leuk Esterase: Negative / RBC: 0 /hpf / WBC 1 /HPF   Sq Epi:  / Non Sq Epi: 1 /hpf / Bacteria: Negative      Sodium, Random Urine: 86 mmol/L ( @ 03:02)  Creatinine, Random Urine: 51 mg/dL ( @ 03:02)

## 2022-11-10 NOTE — DISCHARGE NOTE PROVIDER - PROVIDER TOKENS
PROVIDER:[TOKEN:[[id number]],FOLLOWUP:[1 week]] PROVIDER:[TOKEN:[[id number]],FOLLOWUP:[1 week]],PROVIDER:[TOKEN:[[id number]]],PROVIDER:[TOKEN:[[id number]]]

## 2022-11-10 NOTE — PROVIDER CONTACT NOTE (OTHER) - ACTION/TREATMENT ORDERED:
NP notified and aware.  Continue heparin gtt at same rate.  Follow heparin nomogram protocol.  Continue to monitor
Continue to monitor as per CARIN Singh.
Seen by NP. EKG done, Junctional rhythm, will notify cardiology by NP

## 2022-11-10 NOTE — PROVIDER CONTACT NOTE (OTHER) - SITUATION
Aflutter for 15 seconds on cardiac monitor up to 's
PTT: 77.5
pt is in occasional bradycardia /junctional rhythm

## 2022-11-10 NOTE — DISCHARGE NOTE PROVIDER - DETAILS OF MALNUTRITION DIAGNOSIS/DIAGNOSES
This patient has been assessed with a concern for Malnutrition and was treated during this hospitalization for the following Nutrition diagnosis/diagnoses:     -  10/31/2022: Morbid obesity (BMI > 40)

## 2022-11-10 NOTE — DISCHARGE NOTE PROVIDER - HOSPITAL COURSE
73 y/o F with pmhx of COPD on home O2(3L) with severe pHTN, MVR/TVR , AF s/p ablation,  OHS/MIGUEL on home biPAP, HTN, HLD who presents with one week of vomiting with poor oral intake also complaining of SOB and chest pain. Patient also stated she has been taking prednisone on/off for the past 3 months for chest tightness and does endorse some weight gain. In the ED pt found to have A Flutter and given Amio 400 po and Dig loaded.     #PULM  - Currently on home 3-4 L NC, No exp wheezing   - CXR likely fluid OL  - Continue proair inhalation  - c/w prednisone 5mg since on chronically for 3 months    #MISAEL - Nephrology consulted - likely hemodynamically mediated in setting of aflutter and HF with recurrent MISAEL post-cardioversion with bradycardia. Scr improving. Continue with supportive care. UA bland with hyaline casts. FeNa elevated however drawn on diuretics (not accurate). CT without hydronephrosis. Avoid nephrotoxins.    AFLutter  -  s/p ELIZABETH/DCCV 11/4/22, currently junctional rhythm/ectopic atrial rhythm 50s - HRs stable with appropriate increase to SR 100s with ambulation, will monitor off of Diltiazem and Metoprolol; no plan for PPM at this time  - Nephrology following for MISAEL on CKD  - Adjust Amiodarone load to 200 mg BID, now on 100mg daily      MVR/TV replacement off AC  - echo technically difficult, unable to assess due to habitus and positioning  - per chart review under last name Nancy MRN [id number] - mechanical valve replacement 1999, was previously on coumadin but stopped in 09/2021 after worsening anemia, concern for GI bleed & epistaxis.   - continue with ac  -  ENDO  - A1c 10.7  - FS ~100-130  -adjust insulin for optimal blood sugar control    Hyperkalemia- Kayexalate given, resolved    DCP and medication reconciliatin discussed with Dr Newman and in agreement. Patient is hemodynamically stable and cleared for discharge

## 2022-11-11 LAB
GLUCOSE BLDC GLUCOMTR-MCNC: 110 MG/DL — HIGH (ref 70–99)
GLUCOSE BLDC GLUCOMTR-MCNC: 132 MG/DL — HIGH (ref 70–99)
GLUCOSE BLDC GLUCOMTR-MCNC: 133 MG/DL — HIGH (ref 70–99)
GLUCOSE BLDC GLUCOMTR-MCNC: 151 MG/DL — HIGH (ref 70–99)
HCT VFR BLD CALC: 32.9 % — LOW (ref 34.5–45)
HGB BLD-MCNC: 9.4 G/DL — LOW (ref 11.5–15.5)
INR BLD: 2.49 RATIO — HIGH (ref 0.88–1.16)
MCHC RBC-ENTMCNC: 28.6 GM/DL — LOW (ref 32–36)
MCHC RBC-ENTMCNC: 29.3 PG — SIGNIFICANT CHANGE UP (ref 27–34)
MCV RBC AUTO: 102.5 FL — HIGH (ref 80–100)
NRBC # BLD: 0 /100 WBCS — SIGNIFICANT CHANGE UP (ref 0–0)
PLATELET # BLD AUTO: 223 K/UL — SIGNIFICANT CHANGE UP (ref 150–400)
PROTHROM AB SERPL-ACNC: 29.2 SEC — HIGH (ref 10.5–13.4)
RBC # BLD: 3.21 M/UL — LOW (ref 3.8–5.2)
RBC # FLD: 15.3 % — HIGH (ref 10.3–14.5)
WBC # BLD: 6.82 K/UL — SIGNIFICANT CHANGE UP (ref 3.8–10.5)
WBC # FLD AUTO: 6.82 K/UL — SIGNIFICANT CHANGE UP (ref 3.8–10.5)

## 2022-11-11 PROCEDURE — 99232 SBSQ HOSP IP/OBS MODERATE 35: CPT

## 2022-11-11 RX ORDER — WARFARIN SODIUM 2.5 MG/1
5 TABLET ORAL ONCE
Refills: 0 | Status: COMPLETED | OUTPATIENT
Start: 2022-11-11 | End: 2022-11-11

## 2022-11-11 RX ADMIN — LIDOCAINE 1 PATCH: 4 CREAM TOPICAL at 22:34

## 2022-11-11 RX ADMIN — Medication 650 MILLIGRAM(S): at 12:07

## 2022-11-11 RX ADMIN — LIDOCAINE 1 PATCH: 4 CREAM TOPICAL at 02:56

## 2022-11-11 RX ADMIN — SENNA PLUS 2 TABLET(S): 8.6 TABLET ORAL at 22:31

## 2022-11-11 RX ADMIN — PANTOPRAZOLE SODIUM 40 MILLIGRAM(S): 20 TABLET, DELAYED RELEASE ORAL at 05:45

## 2022-11-11 RX ADMIN — SIMVASTATIN 10 MILLIGRAM(S): 20 TABLET, FILM COATED ORAL at 22:32

## 2022-11-11 RX ADMIN — AMIODARONE HYDROCHLORIDE 200 MILLIGRAM(S): 400 TABLET ORAL at 05:45

## 2022-11-11 RX ADMIN — Medication 650 MILLIGRAM(S): at 11:27

## 2022-11-11 RX ADMIN — INSULIN GLARGINE 12 UNIT(S): 100 INJECTION, SOLUTION SUBCUTANEOUS at 22:30

## 2022-11-11 RX ADMIN — WARFARIN SODIUM 5 MILLIGRAM(S): 2.5 TABLET ORAL at 22:32

## 2022-11-11 RX ADMIN — Medication 5 MILLIGRAM(S): at 05:45

## 2022-11-11 RX ADMIN — ISOSORBIDE MONONITRATE 30 MILLIGRAM(S): 60 TABLET, EXTENDED RELEASE ORAL at 13:10

## 2022-11-11 RX ADMIN — Medication 2: at 13:10

## 2022-11-11 RX ADMIN — BUMETANIDE 2 MILLIGRAM(S): 0.25 INJECTION INTRAMUSCULAR; INTRAVENOUS at 05:45

## 2022-11-11 NOTE — PROGRESS NOTE ADULT - ASSESSMENT
71 y/o F with pmhx of COPD on home O2(3L) with severe pHTN, MVR/TVR , AF s/p ablation,  OHS/MIGUEL on home biPAP, HTN, HLD who presents with one week of vomiting with poor oral intake also complaining of SOB and chest pain.  She also states she has been taking prednisone on/off for the past 3 months for chest tightness and does endorse some weight gain.     In the ED pt found to have A Flutter and given Amio 400 po and Dig loaded.     #PULM  - Currently on home 3-4 L NC, No exp wheezing   - CXR likely fluid OL  - Continue proair inhalation  - c/w prednisone 5mg since on chronically for 3 months    AFLutter  -  s/p ELIZABETH/DCCV 11/4/22, currently junctional rhythm/ectopic atrial rhythm 40s - patient reports feeling ?weak  - Discontinue Metoprolol and Cardizem  - Adjust Amiodarone load to 200 mg BID for now  -as per EP , no PPM at present , pt can be dced   MVR/TV replacement off AC  - echo technically difficult, unable to assess due to habitus and positioning  - per chart review under last name Nancy MRN [id number] - mechanical valve replacement 1999, was previously on coumadin but stopped in 09/2021 after worsening anemia, concern for GI bleed & epistaxis.   - continue with ac  -  ENDO  - A1c 10.7  - FS ~100-130  -adjust insulin for optimal blood sugar control    Hyperkalemia- resolved    waiting for MILLIE

## 2022-11-11 NOTE — PROGRESS NOTE ADULT - ASSESSMENT
72 year old Female with history of COPD, CHF presents with SOB. Nephrology consulted for elevated Scr.    1) MISAEL: likely hemodynamically mediated in setting of aflutter and HF with recurrent MISAEL post-cardioversion with bradycardia. Scr improving. Continue with supportive care. UA bland with hyaline casts. FeNa elevated however drawn on diuretics (not accurate). CT without hydronephrosis. Avoid nephrotoxins.    2) CKD-3b: Baseline Scr 1.4-1.6 with CKD likely due to DM. Outpatient CKD work up. Monitor electrolytes.    3) HTN with CKD: BP controlled. Bradycardia as per cardiology/EP. Monitor BP.    4) LE edema: Improving. Continue with bumex 2 mg PO daily. TTE with mild to moderate LVSD. Monitor UO.    5) Hypercalcemia: Secondary to primary HPT. Defer sensipar given resolution of hypercalcemia. Monitor serum calcium.    6) Anemia of renal disease: Hb low with iron deficiency s/p venofer. Monitor Hb.    7) L renal mass: Not visualized on CT. Outpatient Urology evaluation.      [facility] NEPHROLOGY  Rodrigue Amador M.D.  Rob Perez D.O.  Ana Song M.D.  Lucrecia López, MSN, ANP-C    Telephone: [phone]  Facsimile: [phone]    [id number] [id number]

## 2022-11-11 NOTE — PROGRESS NOTE ADULT - SUBJECTIVE AND OBJECTIVE BOX
PULMONARY PROGRESS NOTE    DAMARI GUERRERO  MRN-[id number]    Patient is a 72y old  Female who presents with a chief complaint of Vomiting (11 Nov 2022 13:44)      HPI:  -on her home 02  pending rehab  no resp complaints  using her trilogy   -    ROS:   -    ACTIVE MEDICATION LIST:  MEDICATIONS  (STANDING):  aMIOdarone    Tablet 200 milliGRAM(s) Oral daily  buMETAnide 2 milliGRAM(s) Oral daily  insulin glargine Injectable (LANTUS) 12 Unit(s) SubCutaneous at bedtime  insulin lispro (ADMELOG) corrective regimen sliding scale   SubCutaneous at bedtime  insulin lispro (ADMELOG) corrective regimen sliding scale   SubCutaneous three times a day before meals  isosorbide   mononitrate ER Tablet (IMDUR) 30 milliGRAM(s) Oral daily  lidocaine   4% Patch 1 Patch Transdermal daily  pantoprazole    Tablet 40 milliGRAM(s) Oral before breakfast  polyethylene glycol 3350 17 Gram(s) Oral daily  predniSONE   Tablet 5 milliGRAM(s) Oral daily  senna 2 Tablet(s) Oral at bedtime  simvastatin 10 milliGRAM(s) Oral at bedtime    MEDICATIONS  (PRN):  acetaminophen     Tablet .. 650 milliGRAM(s) Oral every 6 hours PRN Temp greater or equal to 38C (100.4F), Mild Pain (1 - 3)  ALBUTerol    90 MICROgram(s) HFA Inhaler 2 Puff(s) Inhalation every 6 hours PRN Bronchospasm  melatonin 3 milliGRAM(s) Oral at bedtime PRN Insomnia      EXAM:  Vital Signs Last 24 Hrs  T(C): 36.6 (11 Nov 2022 11:27), Max: 36.6 (11 Nov 2022 04:22)  T(F): 97.8 (11 Nov 2022 11:27), Max: 97.8 (11 Nov 2022 04:22)  HR: 59 (11 Nov 2022 11:27) (58 - 79)  BP: 144/71 (11 Nov 2022 11:27) (113/50 - 144/71)  BP(mean): --  RR: 18 (11 Nov 2022 11:27) (18 - 18)  SpO2: 100% (11 Nov 2022 11:27) (92% - 100%)    Parameters below as of 11 Nov 2022 11:27  Patient On (Oxygen Delivery Method): nasal cannula  O2 Flow (L/min): 3      GENERAL: The patient is awake and alert in no apparent distress.     LUNGS: Clear to auscultation without wheezing, rales or rhonchi; respirations unlabored                           9.4    6.82  )-----------( 223      ( 11 Nov 2022 06:59 )             32.9      < from: Xray Chest 1 View-PORTABLE IMMEDIATE (Xray Chest 1 View-PORTABLE IMMEDIATE .) (10.23.22 @ 00:39) >    ACC: [id number] EXAM:  XR CHEST PORTABLE IMMED 1V                          PROCEDURE DATE:  10/23/2022          INTERPRETATION:  EXAMINATION: XR CHEST IMMEDIATE    CLINICAL INDICATION: Dyspnea    TECHNIQUE: Single frontal, portable view of the chest was obtained.    COMPARISON: Chest x-ray 9/6/2022, abdominal CT 10/22/2022    FINDINGS:  Status post median sternotomy.  The heart size is not well evaluated on this projection.  Redemonstrated bilateral hazy opacities similar to prior exam: Pulmonary  edema/interstitial/alveolar infiltrates.  No pneumothorax.    IMPRESSION:  Pulmonary edema/interstitial/alveolar infiltrates, similar to prior exam.    --- End of Report ---           KAZ DALE MD; Resident Radiologist  This document has been electronically signed.  SILVANA GALLARDO DO; Attending Radiologist  This document has been electronically signed. Oct 23 2022  8:27AM    < end of copied text >      PROBLEM LIST:  72y Female with HEALTH ISSUES - PROBLEM Dx:  Atrial flutter    Pulmonary hypertension    Acute on chronic combined systolic and diastolic congestive heart failure             RECS:  on trilogy  on 02  on inhalers  cardiac optimization  should see us/pulm outpatient for pfts and probably trying to taper off prednisone  no pulm objection to dc planning      Please call with any questions over the weekend  Irma Eaton DO  [facility] Pulmonary/Sleep Medicine  [phone]

## 2022-11-11 NOTE — PROGRESS NOTE ADULT - SUBJECTIVE AND OBJECTIVE BOX
SUBJECTIVE / OVERNIGHT EVENTS:pt seen and examined    MEDICATIONS  (STANDING):  aMIOdarone    Tablet 200 milliGRAM(s) Oral daily  buMETAnide 2 milliGRAM(s) Oral daily  insulin glargine Injectable (LANTUS) 12 Unit(s) SubCutaneous at bedtime  insulin lispro (ADMELOG) corrective regimen sliding scale   SubCutaneous at bedtime  insulin lispro (ADMELOG) corrective regimen sliding scale   SubCutaneous three times a day before meals  isosorbide   mononitrate ER Tablet (IMDUR) 30 milliGRAM(s) Oral daily  lidocaine   4% Patch 1 Patch Transdermal daily  pantoprazole    Tablet 40 milliGRAM(s) Oral before breakfast  polyethylene glycol 3350 17 Gram(s) Oral daily  predniSONE   Tablet 5 milliGRAM(s) Oral daily  senna 2 Tablet(s) Oral at bedtime  simvastatin 10 milliGRAM(s) Oral at bedtime    MEDICATIONS  (PRN):  acetaminophen     Tablet .. 650 milliGRAM(s) Oral every 6 hours PRN Temp greater or equal to 38C (100.4F), Mild Pain (1 - 3)  ALBUTerol    90 MICROgram(s) HFA Inhaler 2 Puff(s) Inhalation every 6 hours PRN Bronchospasm  melatonin 3 milliGRAM(s) Oral at bedtime PRN Insomnia    Vital Signs Last 24 Hrs  T(C): 36.8 (11-11-22 @ 21:27), Max: 36.8 (11-11-22 @ 21:27)  T(F): 98.2 (11-11-22 @ 21:27), Max: 98.2 (11-11-22 @ 21:27)  HR: 67 (11-11-22 @ 22:42) (59 - 79)  BP: 141/72 (11-11-22 @ 21:27) (123/74 - 144/71)  BP(mean): --  RR: 18 (11-11-22 @ 21:27) (18 - 18)  SpO2: 95% (11-11-22 @ 22:42) (92% - 100%)          Skin: No rash.  Eyes: No recent vision problems or eye pain.  ENT: No congestion, ear pain, or sore throat.  Cardiovascular: No chest pain or palpation.  Respiratory: No cough, shortness of breath, congestion, or wheezing.  Gastrointestinal: No abdominal pain, nausea, vomiting, or diarrhea.  Genitourinary: No dysuria.  Musculoskeletal: No joint swelling.  Neurologic: No headache.    PHYSICAL EXAM:  GENERAL: NAD  EYES: EOMI, PERRLA  NECK: Supple, No JVD  CHEST/LUNG: dec breath sounds at bases  HEART:  S1 , S2 +  ABDOMEN: soft , bs+  EXTREMITIES:  edema+  NEUROLOGY:alert awake oriented    LABS:        Creatinine Trend: 2.47 <--, 2.83 <--, 3.24 <--, 3.34 <--, 2.85 <--                        9.4    6.82  )-----------( 223      ( 11 Nov 2022 06:59 )             32.9     Urine Studies:              PT/INR - ( 11 Nov 2022 06:59 )   PT: 29.2 sec;   INR: 2.49 ratio                   PT/INR - ( 10 Nov 2022 10:28 )   PT: 26.9 sec;   INR: 2.32 ratio                   PT/INR - ( 09 Nov 2022 09:57 )   PT: 26.2 sec;   INR: 2.24 ratio

## 2022-11-11 NOTE — PROGRESS NOTE ADULT - SUBJECTIVE AND OBJECTIVE BOX
PATIENT SEEN AND EXAMINED ON :- 11/11/22  DATE OF SERVICE:  11/11/22           Interim events noted,Labs ,Radiological studies and Cardiology tests reviewed .       HOSPITAL COURSE: HPI:  73 y/o F with pmhx of COPD on home O2(3L) with severe pHTN, MVR/TVR , AF s/p ablation,  OHS/MIGUEL on home biPAP, HTN, HLD who presents with one week of vomiting with poor oral intake also complaining of SOB and chest pain.  She also states she has been taking prednisone on/off for the past 3 months for chest tightness and does endorse some weight gain.     In the ED pt found to have A Flutter and given Amio 400 po and Dig loaded.  (23 Oct 2022 04:23)      INTERIM EVENTS:Patient seen at bedside ,interim events noted.      PMH -reviewed admission note, no change since admission  HEART FAILURE: Acute[ ]Chronic[ ] Systolic[ ] Diastolic[ ] Combined Systolic and Diastolic[ ]  CAD[ ] CABG[ ] PCI[ ]  DEVICES[ ] PPM[ ] ICD[ ] ILR[ ]  ATRIAL FIBRILLATION[ ] Paroxysmal[ ] Permanent[ ] CHADS2-[  ]  MISAEL[ ] CKD1[ ] CKD2[ ] CKD3[ ] CKD4[ ] ESRD[ ]  COPD[ ] HTN[ ]   DM[ ] Type1[ ] Type 2[ ]   CVA[ ] Paresis[ ]    AMBULATION: Assisted[ ] Cane/walker[ ] Independent[ ]    MEDICATIONS  (STANDING):  aMIOdarone    Tablet 200 milliGRAM(s) Oral daily  buMETAnide 2 milliGRAM(s) Oral daily  insulin glargine Injectable (LANTUS) 12 Unit(s) SubCutaneous at bedtime  insulin lispro (ADMELOG) corrective regimen sliding scale   SubCutaneous at bedtime  insulin lispro (ADMELOG) corrective regimen sliding scale   SubCutaneous three times a day before meals  isosorbide   mononitrate ER Tablet (IMDUR) 30 milliGRAM(s) Oral daily  lidocaine   4% Patch 1 Patch Transdermal daily  pantoprazole    Tablet 40 milliGRAM(s) Oral before breakfast  polyethylene glycol 3350 17 Gram(s) Oral daily  predniSONE   Tablet 5 milliGRAM(s) Oral daily  senna 2 Tablet(s) Oral at bedtime  simvastatin 10 milliGRAM(s) Oral at bedtime  warfarin 5 milliGRAM(s) Oral once    MEDICATIONS  (PRN):  acetaminophen     Tablet .. 650 milliGRAM(s) Oral every 6 hours PRN Temp greater or equal to 38C (100.4F), Mild Pain (1 - 3)  ALBUTerol    90 MICROgram(s) HFA Inhaler 2 Puff(s) Inhalation every 6 hours PRN Bronchospasm  melatonin 3 milliGRAM(s) Oral at bedtime PRN Insomnia    REVIEW OF SYSTEMS:  Constitutional: [ ] fever, [ ]weight loss,  [ ]fatigue [ ]weight gain  Eyes: [ ] visual changes  Respiratory: [ ]shortness of breath;  [ ] cough, [ ]wheezing, [ ]chills, [ ]hemoptysis  Cardiovascular: [ ] chest pain, [ ]palpitations, [ ]dizziness,  [ ]leg swelling[ ]orthopnea[ ]PND  Gastrointestinal: [ ] abdominal pain, [ ]nausea, [ ]vomiting,  [ ]diarrhea [ ]Constipation [ ]Melena  Genitourinary: [ ] dysuria, [ ] hematuria [ ]Junior  Neurologic: [ ] headaches [ ] tremors[ ]weakness [ ]Paralysis Right[ ] Left[ ]  Skin: [ ] itching, [ ]burning, [ ] rashes  Endocrine: [ ] heat or cold intolerance  Musculoskeletal: [ ] joint pain or swelling; [ ] muscle, back, or extremity pain  Psychiatric: [ ] depression, [ ]anxiety, [ ]mood swings, or [ ]difficulty sleeping  Hematologic: [ ] easy bruising, [ ] bleeding gums    [ ] All remaining systems negative except as per above.   [ ]Unable to obtain.  [x] No change in ROS since admission      Vital Signs Last 24 Hrs  T(C): 36.6 (11 Nov 2022 11:27), Max: 36.6 (11 Nov 2022 04:22)  T(F): 97.8 (11 Nov 2022 11:27), Max: 97.8 (11 Nov 2022 04:22)  HR: 63 (11 Nov 2022 15:28) (58 - 79)  BP: 144/71 (11 Nov 2022 11:27) (113/50 - 144/71)  BP(mean): --  RR: 18 (11 Nov 2022 11:27) (18 - 18)  SpO2: 94% (11 Nov 2022 15:28) (92% - 100%)    Parameters below as of 11 Nov 2022 11:27  Patient On (Oxygen Delivery Method): nasal cannula  O2 Flow (L/min): 3    I&O's Summary    10 Nov 2022 07:01  -  11 Nov 2022 07:00  --------------------------------------------------------  IN: 1020 mL / OUT: 950 mL / NET: 70 mL    11 Nov 2022 07:01  -  11 Nov 2022 20:27  --------------------------------------------------------  IN: 420 mL / OUT: 400 mL / NET: 20 mL        PHYSICAL EXAM:  General: No acute distress BMI-  HEENT: EOMI, PERRL  Neck: Supple, [ ] JVD  Lungs: Equal air entry bilaterally; [ ] rales [ ] wheezing [ ] rhonchi  Heart: Regular rate and rhythm; [x ] murmur   2/6 [ x] systolic [ ] diastolic [ ] radiation[ ] rubs [ ]  gallops  Abdomen: Nontender, bowel sounds present  Extremities: No clubbing, cyanosis, [ ] edema [ ]Pulses  equal and intact  Nervous system:  Alert & Oriented X3, no focal deficits  Psychiatric: Normal affect  Skin: No rashes or lesions    LABS:        Creatinine Trend: 2.47<--, 2.83<--, 3.24<--, 3.34<--, 2.85<--, 2.30<--                        9.4    6.82  )-----------( 223      ( 11 Nov 2022 06:59 )             32.9     PT/INR - ( 11 Nov 2022 06:59 )   PT: 29.2 sec;   INR: 2.49 ratio           Patient name: DAMARI GUERRERO  YOB: 1950   Age: 72 (F)   MR#: [id number]  Study Date: 11/2/2022  Location: [address] [place], [place] [id number]: Khai Gordon MD  Study quality: Technically good  Referring Physician: Renny Newman MD  Blood Pressure: 160/92 mmHg  Height: 163 cm  Weight: 116 kg  BSA: 2.2 m2  Heart Rate: 95 mmHg  ------------------------------------------------------------------------  PROCEDURE: Transesophageal (ELIZABETH) was performed.  Informed  consent was first obtained for ELIZABETH. The patient was sedated  - see anesthesia record.  The procedure was monitored with  automatic blood pressure monitoring, ECG tracings and pulse  oximetry. The transesophageal probe was placed in the  esophagus posterior to the heart without complications.  INDICATION: Unspecified atrial fibrillation (I48.91)  ------------------------------------------------------------------------  Dimensions:    Normal Values:  LA:            2.0 - 4.0 cm  Ao:            2.0 - 3.8 cm  SEPTUM:    0.6 - 1.2 cm  PWT:           0.6 - 1.1 cm  LVIDd:         3.0 - 5.6 cm  LVIDs:         1.8 - 4.0 cm  EF (Visual Estimate): na %  ------------------------------------------------------------------------  Observations:  Mitral Valve: Mechanical prosthetic mitral valve  replacement. The mechanical leaflets are opening well.  Aortic Valve/Aorta:  Simple atheroma noted in aortic arch/descending aorta.  Left Atrium: No left atrium or left atrial appendage  thrombus.  Left Ventricle: Endocardium notwell visualized; unable to  evaluate left ventricular systolic function.  Pericardium/Pleura: Normal pericardium with no pericardial  effusion.  ------------------------------------------------------------------------  Conclusions:  1. Mechanical prosthetic mitral valve replacement. The  mechanical leaflets are opening well.  2. No left atrium or left atrial appendage thrombus.  ------------------------------------------------------------------------  Confirmed on  11/4/2022 - 17:35:48 by Stu Domínguez M.D.  ------------------------------------------------------------------------

## 2022-11-11 NOTE — PROGRESS NOTE ADULT - SUBJECTIVE AND OBJECTIVE BOX
[facility] NEPHROLOGY- PROGRESS NOTE    72 year old Female with history of COPD, CHF presents with SOB. Nephrology consulted for elevated Scr.    REVIEW OF SYSTEMS:  Gen: no fevers  Cards: no chest pain  Resp: + dyspnea with exertion improving  GI: no nausea or vomiting or diarrhea  Vascular: + LE edema improving    No Known Allergies      Hospital Medications: Medications reviewed      VITALS:  T(F): 97.8 (11-11-22 @ 11:27), Max: 97.8 (11-11-22 @ 04:22)  HR: 59 (11-11-22 @ 11:27)  BP: 144/71 (11-11-22 @ 11:27)  RR: 18 (11-11-22 @ 11:27)  SpO2: 100% (11-11-22 @ 11:27)  Wt(kg): --    11-10 @ 07:01  -  11-11 @ 07:00  --------------------------------------------------------  IN: 1020 mL / OUT: 950 mL / NET: 70 mL    11-11 @ 07:01  -  11-11 @ 13:45  --------------------------------------------------------  IN: 240 mL / OUT: 400 mL / NET: -160 mL        PHYSICAL EXAM:    Gen: NAD, calm  Cards: Irregularly irregular, +S1/S2, no M/G/R  Resp: CTA B/L  GI: soft, NT/ND, NABS  Vascular: 1+ LE edema B/L      LABS: N/A

## 2022-11-11 NOTE — PROGRESS NOTE ADULT - ASSESSMENT
73 y/o F with pmhx of COPD on home O2(3L) with severe pHTN, MVR/TVR , AF s/p ablation,  OHS/MIGUEL on home biPAP, HTN, HLD who presents with one week of vomiting with poor oral intake also complaining of SOB and chest pain.  She also states she has been taking prednisone on/off for the past 3 months for chest tightness and does endorse some weight gain.     #PULM  - Currently on home 3-4 L NC, No exp wheezing   - CXR likely fluid OL  - Continue proair inhalation  - c/w prednisone 5mg since on chronically for 3 months    AFLutter  -  s/p ELIZABETH/DCCV 11/4/22, currently junctional rhythm/ectopic atrial rhythm 40s - patient reports feeling ?weak  - Discontinue Metoprolol and Cardizem  - Adjust Amiodarone load to 200 mg BID for now  - s/p ELIZABETH/DCCV 11/4/22, currently junctional rhythm/ectopic atrial rhythm 50s - patient reports chronic dyspnea but no light-headedness, chest pain, palpitations   - Currently on Amiodarone 200 mg BID, will need close F/U of pulmonary status but okay to continue with underlying COPD   - Continue to dose Coumadin, INR today pending   - HRs improving, will monitor off of Diltiazem and Metoprolol; no plan for PPM at this time    MVR/TV replacement off AC  - echo technically difficult, unable to assess due to habitus and positioning  - per chart review under last name Nancy MRN [id number] - mechanical valve replacement 1999, was previously on coumadin but stopped in 09/2021 after worsening anemia, concern for GI bleed & epistaxis.   - continue with ac  -  ENDO  - A1c 10.7  - FS ~100-130  - Continue home Lantus and premeal, adjust as appropriate

## 2022-11-11 NOTE — PROGRESS NOTE ADULT - SUBJECTIVE AND OBJECTIVE BOX
Patient is a 72y Female     Patient is a 72y old  Female who presents with a chief complaint of Vomiting (10 Nov 2022 14:43)      HPI:  73 y/o F with pmhx of COPD on home O2(3L) with severe pHTN, MVR/TVR , AF s/p ablation,  OHS/MIGUEL on home biPAP, HTN, HLD who presents with one week of vomiting with poor oral intake also complaining of SOB and chest pain.  She also states she has been taking prednisone on/off for the past 3 months for chest tightness and does endorse some weight gain.     In the ED pt found to have A Flutter and given Amio 400 po and Dig loaded.  (23 Oct 2022 04:23)      PAST MEDICAL & SURGICAL HISTORY:  Atrial fibrillation  S/P Ablation      Pulmonary hypertension      MIGUEL (obstructive sleep apnea)      COPD (chronic obstructive pulmonary disease)  on home O2      CHF (congestive heart failure)      HTN (hypertension)      Gout      Mitral valve replaced      Tricuspid valve replaced      CHF (congestive heart failure)      Hypertension      COPD (chronic obstructive pulmonary disease)      History of mitral valve replacement with mechanical valve      Tricuspid valve replaced  mechanical          MEDICATIONS  (STANDING):  aMIOdarone    Tablet 200 milliGRAM(s) Oral daily  buMETAnide 2 milliGRAM(s) Oral daily  insulin glargine Injectable (LANTUS) 12 Unit(s) SubCutaneous at bedtime  insulin lispro (ADMELOG) corrective regimen sliding scale   SubCutaneous at bedtime  insulin lispro (ADMELOG) corrective regimen sliding scale   SubCutaneous three times a day before meals  isosorbide   mononitrate ER Tablet (IMDUR) 30 milliGRAM(s) Oral daily  lidocaine   4% Patch 1 Patch Transdermal daily  pantoprazole    Tablet 40 milliGRAM(s) Oral before breakfast  polyethylene glycol 3350 17 Gram(s) Oral daily  predniSONE   Tablet 5 milliGRAM(s) Oral daily  senna 2 Tablet(s) Oral at bedtime  simvastatin 10 milliGRAM(s) Oral at bedtime      Allergies    No Known Allergies    Intolerances        SOCIAL HISTORY:  Denies ETOh,Smoking,     FAMILY HISTORY:  Family history of hypertension (Father, Sibling)    Family history of stroke    Family history of hypertension (Mother)        REVIEW OF SYSTEMS:    CONSTITUTIONAL: No weakness, fevers or chills  EYES/ENT: No visual changes;  No vertigo or throat pain   NECK: No pain or stiffness  RESPIRATORY: No cough, wheezing, hemoptysis; No shortness of breath  CARDIOVASCULAR: No chest pain or palpitations  GASTROINTESTINAL: No abdominal or epigastric pain. No nausea, vomiting, or hematemesis; No diarrhea or constipation. No melena or hematochezia.  GENITOURINARY: No dysuria, frequency or hematuria  NEUROLOGICAL: No numbness or weakness  SKIN: No itching, burning, rashes, or lesions   All other review of systems is negative unless indicated above.    VITAL:  T(C): , Max: 36.6 (11-11-22 @ 04:22)  T(F): , Max: 97.8 (11-11-22 @ 04:22)  HR: 79 (11-11-22 @ 04:22)  BP: 123/74 (11-11-22 @ 04:22)  BP(mean): --  RR: 18 (11-11-22 @ 04:22)  SpO2: 97% (11-11-22 @ 04:22)  Wt(kg): --    I and O's:    11-08 @ 07:01  -  11-09 @ 07:00  --------------------------------------------------------  IN: 1260 mL / OUT: 1900 mL / NET: -640 mL    11-09 @ 07:01  -  11-10 @ 07:00  --------------------------------------------------------  IN: 600 mL / OUT: 1250 mL / NET: -650 mL    11-10 @ 07:01  -  11-11 @ 05:51  --------------------------------------------------------  IN: 1020 mL / OUT: 950 mL / NET: 70 mL          PHYSICAL EXAM:    Constitutional: NAD  HEENT: PERRLA,   Neck: No JVD  Respiratory: CTA B/L  Cardiovascular: S1 and S2  Gastrointestinal: BS+, soft, NT/ND  Extremities: No peripheral edema  Neurological: A/O x 3, no focal deficits  Psychiatric: Normal mood, normal affect  : No Junior  Skin: No rashes  Access: Not applicable  Back: No CVA tenderness    LABS:                RADIOLOGY & ADDITIONAL STUDIES:

## 2022-11-12 LAB
ANION GAP SERPL CALC-SCNC: 10 MMOL/L — SIGNIFICANT CHANGE UP (ref 5–17)
BUN SERPL-MCNC: 62 MG/DL — HIGH (ref 7–23)
CALCIUM SERPL-MCNC: 9.8 MG/DL — SIGNIFICANT CHANGE UP (ref 8.4–10.5)
CHLORIDE SERPL-SCNC: 97 MMOL/L — SIGNIFICANT CHANGE UP (ref 96–108)
CO2 SERPL-SCNC: 35 MMOL/L — HIGH (ref 22–31)
CREAT SERPL-MCNC: 2.05 MG/DL — HIGH (ref 0.5–1.3)
EGFR: 25 ML/MIN/1.73M2 — LOW
GLUCOSE BLDC GLUCOMTR-MCNC: 117 MG/DL — HIGH (ref 70–99)
GLUCOSE BLDC GLUCOMTR-MCNC: 127 MG/DL — HIGH (ref 70–99)
GLUCOSE BLDC GLUCOMTR-MCNC: 156 MG/DL — HIGH (ref 70–99)
GLUCOSE BLDC GLUCOMTR-MCNC: 160 MG/DL — HIGH (ref 70–99)
GLUCOSE SERPL-MCNC: 83 MG/DL — SIGNIFICANT CHANGE UP (ref 70–99)
INR BLD: 2.65 RATIO — HIGH (ref 0.88–1.16)
POTASSIUM SERPL-MCNC: 4.4 MMOL/L — SIGNIFICANT CHANGE UP (ref 3.5–5.3)
POTASSIUM SERPL-SCNC: 4.4 MMOL/L — SIGNIFICANT CHANGE UP (ref 3.5–5.3)
PROTHROM AB SERPL-ACNC: 30.8 SEC — HIGH (ref 10.5–13.4)
SARS-COV-2 RNA SPEC QL NAA+PROBE: SIGNIFICANT CHANGE UP
SODIUM SERPL-SCNC: 142 MMOL/L — SIGNIFICANT CHANGE UP (ref 135–145)

## 2022-11-12 RX ORDER — WARFARIN SODIUM 2.5 MG/1
3 TABLET ORAL AT BEDTIME
Refills: 0 | Status: COMPLETED | OUTPATIENT
Start: 2022-11-12 | End: 2022-11-12

## 2022-11-12 RX ORDER — BUMETANIDE 0.25 MG/ML
2 INJECTION INTRAMUSCULAR; INTRAVENOUS ONCE
Refills: 0 | Status: COMPLETED | OUTPATIENT
Start: 2022-11-12 | End: 2022-11-12

## 2022-11-12 RX ADMIN — Medication 5 MILLIGRAM(S): at 05:47

## 2022-11-12 RX ADMIN — INSULIN GLARGINE 12 UNIT(S): 100 INJECTION, SOLUTION SUBCUTANEOUS at 21:59

## 2022-11-12 RX ADMIN — LIDOCAINE 1 PATCH: 4 CREAM TOPICAL at 10:00

## 2022-11-12 RX ADMIN — LIDOCAINE 1 PATCH: 4 CREAM TOPICAL at 12:50

## 2022-11-12 RX ADMIN — PANTOPRAZOLE SODIUM 40 MILLIGRAM(S): 20 TABLET, DELAYED RELEASE ORAL at 05:47

## 2022-11-12 RX ADMIN — WARFARIN SODIUM 3 MILLIGRAM(S): 2.5 TABLET ORAL at 22:00

## 2022-11-12 RX ADMIN — BUMETANIDE 2 MILLIGRAM(S): 0.25 INJECTION INTRAMUSCULAR; INTRAVENOUS at 05:47

## 2022-11-12 RX ADMIN — POLYETHYLENE GLYCOL 3350 17 GRAM(S): 17 POWDER, FOR SOLUTION ORAL at 12:51

## 2022-11-12 RX ADMIN — AMIODARONE HYDROCHLORIDE 200 MILLIGRAM(S): 400 TABLET ORAL at 05:48

## 2022-11-12 RX ADMIN — LIDOCAINE 1 PATCH: 4 CREAM TOPICAL at 19:29

## 2022-11-12 RX ADMIN — BUMETANIDE 2 MILLIGRAM(S): 0.25 INJECTION INTRAMUSCULAR; INTRAVENOUS at 17:42

## 2022-11-12 RX ADMIN — Medication 2: at 17:41

## 2022-11-12 RX ADMIN — SIMVASTATIN 10 MILLIGRAM(S): 20 TABLET, FILM COATED ORAL at 21:59

## 2022-11-12 RX ADMIN — Medication 2: at 09:29

## 2022-11-12 RX ADMIN — LIDOCAINE 1 PATCH: 4 CREAM TOPICAL at 08:00

## 2022-11-12 RX ADMIN — ISOSORBIDE MONONITRATE 30 MILLIGRAM(S): 60 TABLET, EXTENDED RELEASE ORAL at 12:51

## 2022-11-12 RX ADMIN — LIDOCAINE 1 PATCH: 4 CREAM TOPICAL at 23:36

## 2022-11-12 NOTE — PROGRESS NOTE ADULT - SUBJECTIVE AND OBJECTIVE BOX
PATIENT SEEN AND EXAMINED ON :- 11/12/2022  DATE OF SERVICE:     11/12/2022        Interim events noted,Labs ,Radiological studies and Cardiology tests reviewed .       HOSPITAL COURSE: HPI:  71 y/o F with pmhx of COPD on home O2(3L) with severe pHTN, MVR/TVR , AF s/p ablation,  OHS/MIGUEL on home biPAP, HTN, HLD who presents with one week of vomiting with poor oral intake also complaining of SOB and chest pain.  She also states she has been taking prednisone on/off for the past 3 months for chest tightness and does endorse some weight gain.     In the ED pt found to have A Flutter and given Amio 400 po and Dig loaded.  (23 Oct 2022 04:23)      INTERIM EVENTS:Patient seen at bedside ,interim events noted.      PMH -reviewed admission note, no change since admission  HEART FAILURE: Acute[ ]Chronic[ ] Systolic[ ] Diastolic[ ] Combined Systolic and Diastolic[ ]  CAD[ ] CABG[ ] PCI[ ]  DEVICES[ ] PPM[ ] ICD[ ] ILR[ ]  ATRIAL FIBRILLATION[ ] Paroxysmal[ ] Permanent[ ] CHADS2-[  ]  MISAEL[ ] CKD1[ ] CKD2[ ] CKD3[ ] CKD4[ ] ESRD[ ]  COPD[ ] HTN[ ]   DM[ ] Type1[ ] Type 2[ ]   CVA[ ] Paresis[ ]    AMBULATION: Assisted[ ] Cane/walker[ ] Independent[ ]    MEDICATIONS  (STANDING):  aMIOdarone    Tablet 200 milliGRAM(s) Oral daily  buMETAnide 2 milliGRAM(s) Oral daily  buMETAnide 2 milliGRAM(s) Oral once  insulin glargine Injectable (LANTUS) 12 Unit(s) SubCutaneous at bedtime  insulin lispro (ADMELOG) corrective regimen sliding scale   SubCutaneous at bedtime  insulin lispro (ADMELOG) corrective regimen sliding scale   SubCutaneous three times a day before meals  isosorbide   mononitrate ER Tablet (IMDUR) 30 milliGRAM(s) Oral daily  lidocaine   4% Patch 1 Patch Transdermal daily  pantoprazole    Tablet 40 milliGRAM(s) Oral before breakfast  polyethylene glycol 3350 17 Gram(s) Oral daily  predniSONE   Tablet 5 milliGRAM(s) Oral daily  senna 2 Tablet(s) Oral at bedtime  simvastatin 10 milliGRAM(s) Oral at bedtime  warfarin 3 milliGRAM(s) Oral at bedtime    MEDICATIONS  (PRN):  acetaminophen     Tablet .. 650 milliGRAM(s) Oral every 6 hours PRN Temp greater or equal to 38C (100.4F), Mild Pain (1 - 3)  ALBUTerol    90 MICROgram(s) HFA Inhaler 2 Puff(s) Inhalation every 6 hours PRN Bronchospasm  melatonin 3 milliGRAM(s) Oral at bedtime PRN Insomnia            REVIEW OF SYSTEMS:  Constitutional: [ ] fever, [ ]weight loss,  [ ]fatigue [ ]weight gain  Eyes: [ ] visual changes  Respiratory: [ ]shortness of breath;  [ ] cough, [ ]wheezing, [ ]chills, [ ]hemoptysis  Cardiovascular: [ ] chest pain, [ ]palpitations, [ ]dizziness,  [ ]leg swelling[ ]orthopnea[ ]PND  Gastrointestinal: [ ] abdominal pain, [ ]nausea, [ ]vomiting,  [ ]diarrhea [ ]Constipation [ ]Melena  Genitourinary: [ ] dysuria, [ ] hematuria [ ]Junior  Neurologic: [ ] headaches [ ] tremors[ ]weakness [ ]Paralysis Right[ ] Left[ ]  Skin: [ ] itching, [ ]burning, [ ] rashes  Endocrine: [ ] heat or cold intolerance  Musculoskeletal: [ ] joint pain or swelling; [ ] muscle, back, or extremity pain  Psychiatric: [ ] depression, [ ]anxiety, [ ]mood swings, or [ ]difficulty sleeping  Hematologic: [ ] easy bruising, [ ] bleeding gums    [ ] All remaining systems negative except as per above.   [ ]Unable to obtain.  [x] No change in ROS since admission      Vital Signs Last 24 Hrs  T(C): 36.6 (12 Nov 2022 13:24), Max: 36.8 (11 Nov 2022 21:27)  T(F): 97.9 (12 Nov 2022 13:24), Max: 98.2 (11 Nov 2022 21:27)  HR: 70 (12 Nov 2022 13:24) (63 - 70)  BP: 138/79 (12 Nov 2022 13:24) (138/79 - 144/78)  BP(mean): --  RR: 18 (12 Nov 2022 13:24) (18 - 18)  SpO2: 100% (12 Nov 2022 13:24) (94% - 100%)    Parameters below as of 12 Nov 2022 13:24  Patient On (Oxygen Delivery Method): nasal cannula      I&O's Summary    11 Nov 2022 07:01  -  12 Nov 2022 07:00  --------------------------------------------------------  IN: 520 mL / OUT: 1400 mL / NET: -880 mL    12 Nov 2022 07:01  -  12 Nov 2022 13:34  --------------------------------------------------------  IN: 360 mL / OUT: 625 mL / NET: -265 mL        PHYSICAL EXAM:  General: No acute distress BMI-  HEENT: EOMI, PERRL  Neck: Supple, [ ] JVD  Lungs: Equal air entry bilaterally; [ ] rales [ ] wheezing [ ] rhonchi  Heart: Regular rate and rhythm; [x ] murmur   2/6 [ x] systolic [ ] diastolic [ ] radiation[ ] rubs [ ]  gallops  Abdomen: Nontender, bowel sounds present  Extremities: No clubbing, cyanosis, [ ] edema [ ]Pulses  equal and intact  Nervous system:  Alert & Oriented X3, no focal deficits  Psychiatric: Normal affect  Skin: No rashes or lesions    LABS:  11-12    142  |  97  |  62<H>  ----------------------------<  83  4.4   |  35<H>  |  2.05<H>    Ca    9.8      12 Nov 2022 07:09      Creatinine Trend: 2.05<--, 2.47<--, 2.83<--, 3.24<--, 3.34<--, 2.85<--                        9.4    6.82  )-----------( 223      ( 11 Nov 2022 06:59 )             32.9     PT/INR - ( 12 Nov 2022 07:11 )   PT: 30.8 sec;   INR: 2.65 ratio           Patient name: DAMARI GUERRERO  YOB: 1950   Age: 72 (F)   MR#: [id number]  Study Date: 11/2/2022  [id number] Khai Gordon MD  Study quality: Technically good  Referring Physician: Renny Newman MD  Blood Pressure: 160/92 mmHg  Height: 163 cm  Weight: 116 kg  BSA: 2.2 m2  Heart Rate: 95 mmHg  ------------------------------------------------------------------------  PROCEDURE: Transesophageal (ELIZABETH) was performed.  Informed  consent was first obtained for ELIZABETH. The patient was sedated  - see anesthesia record.  The procedure was monitored with  automatic blood pressure monitoring, ECG tracings and pulse  oximetry. The transesophageal probe was placed in the  esophagus posterior to the heart without complications.  INDICATION: Unspecified atrial fibrillation (I48.91)  ------------------------------------------------------------------------  Dimensions:    Normal Values:  LA:            2.0 - 4.0 cm  Ao:            2.0 - 3.8 cm  SEPTUM:    0.6 - 1.2 cm  PWT:           0.6 - 1.1 cm  LVIDd:         3.0 - 5.6 cm  LVIDs:         1.8 - 4.0 cm  EF (Visual Estimate): na %  ------------------------------------------------------------------------  Observations:  Mitral Valve: Mechanical prosthetic mitral valve  replacement. The mechanical leaflets are opening well.  Aortic Valve/Aorta:  Simple atheroma noted in aortic arch/descending aorta.  Left Atrium: No left atrium or left atrial appendage  thrombus.  Left Ventricle: Endocardium notwell visualized; unable to  evaluate left ventricular systolic function.  Pericardium/Pleura: Normal pericardium with no pericardial  effusion.  ------------------------------------------------------------------------  Conclusions:  1. Mechanical prosthetic mitral valve replacement. The  mechanical leaflets are opening well.  2. No left atrium or left atrial appendage thrombus.  ------------------------------------------------------------------------  Confirmed on  11/4/2022 - 17:35:48 by Stu Domínguez M.D.  ------------------------------------------------------------------------

## 2022-11-12 NOTE — PROGRESS NOTE ADULT - SUBJECTIVE AND OBJECTIVE BOX
[facility] [place] [facility]    72 year old Female with history of COPD, CHF presents with SOB. Nephrology consulted for elevated Scr.    REVIEW OF SYSTEMS:  Gen: no fevers  Cards: no chest pain  Resp: + dyspnea with exertion improving  GI: no nausea or vomiting or diarrhea  Vascular: + LE edema improving    No Known Allergies      Hospital Medications: Medications reviewed      VITALS:  T(F): 97.4 (11-12-22 @ 04:18), Max: 98.2 (11-11-22 @ 21:27)  HR: 64 (11-12-22 @ 05:03)  BP: 144/78 (11-12-22 @ 04:18)  RR: 18 (11-12-22 @ 04:18)  SpO2: 100% (11-12-22 @ 05:03)  Wt(kg): --    11-11 @ 07:01  -  11-12 @ 07:00  --------------------------------------------------------  IN: 520 mL / OUT: 1400 mL / NET: -880 mL    11-12 @ 07:01  -  11-12 @ 10:04  --------------------------------------------------------  IN: 0 mL / OUT: 300 mL / NET: -300 mL        PHYSICAL EXAM:    Gen: NAD, calm  Cards: Irregularly irregular, +S1/S2, no M/G/R  Resp: +left basilar rales  GI: soft, NT/ND, NABS  Vascular: minimal  LE edema B/L      LABS:  11-12    142  |  97  |  62<H>  ----------------------------<  83  4.4   |  35<H>  |  2.05<H>    Ca    9.8      12 Nov 2022 07:09      Creatinine Trend: 2.05 <--, 2.47 <--, 2.83 <--, 3.24 <--, 3.34 <--, 2.85 <--                        9.4    6.82  )-----------( 223      ( 11 Nov 2022 06:59 )             32.9     Urine Studies:

## 2022-11-12 NOTE — PROGRESS NOTE ADULT - SUBJECTIVE AND OBJECTIVE BOX
Patient is a 72y Female     Patient is a 72y old  Female who presents with a chief complaint of Vomiting (12 Nov 2022 10:03)      HPI:  71 y/o F with pmhx of COPD on home O2(3L) with severe pHTN, MVR/TVR , AF s/p ablation,  OHS/MIGUEL on home biPAP, HTN, HLD who presents with one week of vomiting with poor oral intake also complaining of SOB and chest pain.  She also states she has been taking prednisone on/off for the past 3 months for chest tightness and does endorse some weight gain.     In the ED pt found to have A Flutter and given Amio 400 po and Dig loaded.  (23 Oct 2022 04:23)      PAST MEDICAL & SURGICAL HISTORY:  Atrial fibrillation  S/P Ablation      Pulmonary hypertension      MIGUEL (obstructive sleep apnea)      COPD (chronic obstructive pulmonary disease)  on home O2      CHF (congestive heart failure)      HTN (hypertension)      Gout      Mitral valve replaced      Tricuspid valve replaced      CHF (congestive heart failure)      Hypertension      COPD (chronic obstructive pulmonary disease)      History of mitral valve replacement with mechanical valve      Tricuspid valve replaced  mechanical          MEDICATIONS  (STANDING):  aMIOdarone    Tablet 200 milliGRAM(s) Oral daily  buMETAnide 2 milliGRAM(s) Oral once  buMETAnide 2 milliGRAM(s) Oral daily  insulin glargine Injectable (LANTUS) 12 Unit(s) SubCutaneous at bedtime  insulin lispro (ADMELOG) corrective regimen sliding scale   SubCutaneous at bedtime  insulin lispro (ADMELOG) corrective regimen sliding scale   SubCutaneous three times a day before meals  isosorbide   mononitrate ER Tablet (IMDUR) 30 milliGRAM(s) Oral daily  lidocaine   4% Patch 1 Patch Transdermal daily  pantoprazole    Tablet 40 milliGRAM(s) Oral before breakfast  polyethylene glycol 3350 17 Gram(s) Oral daily  predniSONE   Tablet 5 milliGRAM(s) Oral daily  senna 2 Tablet(s) Oral at bedtime  simvastatin 10 milliGRAM(s) Oral at bedtime  warfarin 3 milliGRAM(s) Oral at bedtime      Allergies    No Known Allergies    Intolerances        SOCIAL HISTORY:  Denies ETOh,Smoking,     FAMILY HISTORY:  Family history of hypertension (Father, Sibling)    Family history of stroke    Family history of hypertension (Mother)        REVIEW OF SYSTEMS:    CONSTITUTIONAL: No weakness, fevers or chills  EYES/ENT: No visual changes;  No vertigo or throat pain   NECK: No pain or stiffness  RESPIRATORY: No cough, wheezing, hemoptysis; No shortness of breath  CARDIOVASCULAR: No chest pain or palpitations  GASTROINTESTINAL: No abdominal or epigastric pain. No nausea, vomiting, or hematemesis; No diarrhea or constipation. No melena or hematochezia.  GENITOURINARY: No dysuria, frequency or hematuria  NEUROLOGICAL: No numbness or weakness  SKIN: No itching, burning, rashes, or lesions   All other review of systems is negative unless indicated above.    VITAL:  T(C): , Max: 36.8 (11-11-22 @ 21:27)  T(F): , Max: 98.2 (11-11-22 @ 21:27)  HR: 64 (11-12-22 @ 05:03)  BP: 144/78 (11-12-22 @ 04:18)  BP(mean): --  RR: 18 (11-12-22 @ 04:18)  SpO2: 100% (11-12-22 @ 05:03)  Wt(kg): --    I and O's:    11-10 @ 07:01  -  11-11 @ 07:00  --------------------------------------------------------  IN: 1020 mL / OUT: 950 mL / NET: 70 mL    11-11 @ 07:01  -  11-12 @ 07:00  --------------------------------------------------------  IN: 520 mL / OUT: 1400 mL / NET: -880 mL    11-12 @ 07:01  -  11-12 @ 12:38  --------------------------------------------------------  IN: 360 mL / OUT: 625 mL / NET: -265 mL          PHYSICAL EXAM:    Constitutional: NAD  HEENT: PERRLA,   Neck: No JVD  Respiratory: CTA B/L  Cardiovascular: S1 and S2  Gastrointestinal: BS+, soft, NT/ND  Extremities: No peripheral edema  Neurological: A/O x 3, no focal deficits  Psychiatric: Normal mood, normal affect  : No Junior  Skin: No rashes  Access: Not applicable  Back: No CVA tenderness    LABS:                        9.4    6.82  )-----------( 223      ( 11 Nov 2022 06:59 )             32.9     11-12    142  |  97  |  62<H>  ----------------------------<  83  4.4   |  35<H>  |  2.05<H>    Ca    9.8      12 Nov 2022 07:09            RADIOLOGY & ADDITIONAL STUDIES:

## 2022-11-12 NOTE — PROGRESS NOTE ADULT - SUBJECTIVE AND OBJECTIVE BOX
SUBJECTIVE / OVERNIGHT EVENTS:pt seen and examined    MEDICATIONS  (STANDING):  aMIOdarone    Tablet 200 milliGRAM(s) Oral daily  buMETAnide 2 milliGRAM(s) Oral daily  insulin glargine Injectable (LANTUS) 12 Unit(s) SubCutaneous at bedtime  insulin lispro (ADMELOG) corrective regimen sliding scale   SubCutaneous at bedtime  insulin lispro (ADMELOG) corrective regimen sliding scale   SubCutaneous three times a day before meals  isosorbide   mononitrate ER Tablet (IMDUR) 30 milliGRAM(s) Oral daily  lidocaine   4% Patch 1 Patch Transdermal daily  pantoprazole    Tablet 40 milliGRAM(s) Oral before breakfast  polyethylene glycol 3350 17 Gram(s) Oral daily  predniSONE   Tablet 5 milliGRAM(s) Oral daily  senna 2 Tablet(s) Oral at bedtime  simvastatin 10 milliGRAM(s) Oral at bedtime  warfarin 3 milliGRAM(s) Oral at bedtime    MEDICATIONS  (PRN):  acetaminophen     Tablet .. 650 milliGRAM(s) Oral every 6 hours PRN Temp greater or equal to 38C (100.4F), Mild Pain (1 - 3)  ALBUTerol    90 MICROgram(s) HFA Inhaler 2 Puff(s) Inhalation every 6 hours PRN Bronchospasm  melatonin 3 milliGRAM(s) Oral at bedtime PRN Insomnia    Vital Signs Last 24 Hrs  T(C): 36.6 (11-12-22 @ 13:24), Max: 36.8 (11-11-22 @ 21:27)  T(F): 97.9 (11-12-22 @ 13:24), Max: 98.2 (11-11-22 @ 21:27)  HR: 70 (11-12-22 @ 13:24) (63 - 70)  BP: 138/79 (11-12-22 @ 13:24) (138/79 - 144/78)  BP(mean): --  RR: 18 (11-12-22 @ 13:24) (18 - 18)  SpO2: 100% (11-12-22 @ 13:24) (95% - 100%)            Skin: No rash.  Eyes: No recent vision problems or eye pain.  ENT: No congestion, ear pain, or sore throat.  Cardiovascular: No chest pain or palpation.  Respiratory: No cough, shortness of breath, congestion, or wheezing.  Gastrointestinal: No abdominal pain, nausea, vomiting, or diarrhea.  Genitourinary: No dysuria.  Musculoskeletal: No joint swelling.  Neurologic: No headache.    PHYSICAL EXAM:  GENERAL: NAD  EYES: EOMI, PERRLA  NECK: Supple, No JVD  CHEST/LUNG: dec breath sounds at bases  HEART:  S1 , S2 +  ABDOMEN: soft , bs+  EXTREMITIES:  edema+  NEUROLOGY:alert awake oriented    LABS:  11-12    142  |  97  |  62<H>  ----------------------------<  83  4.4   |  35<H>  |  2.05<H>    Ca    9.8      12 Nov 2022 07:09      Creatinine Trend: 2.05 <--, 2.47 <--, 2.83 <--, 3.24 <--, 3.34 <--                        9.4    6.82  )-----------( 223      ( 11 Nov 2022 06:59 )             32.9     Urine Studies:              PT/INR - ( 12 Nov 2022 07:11 )   PT: 30.8 sec;   INR: 2.65 ratio

## 2022-11-12 NOTE — PROGRESS NOTE ADULT - ASSESSMENT
73 y/o F with pmhx of COPD on home O2(3L) with severe pHTN, MVR/TVR , AF s/p ablation,  OHS/MIGUEL on home biPAP, HTN, HLD who presents with one week of vomiting with poor oral intake also complaining of SOB and chest pain.  She also states she has been taking prednisone on/off for the past 3 months for chest tightness and does endorse some weight gain.     In the ED pt found to have A Flutter and given Amio 400 po and Dig loaded.     #PULM  - Currently on home 3-4 L NC, No exp wheezing   - CXR likely fluid OL  - Continue proair inhalation  - c/w prednisone 5mg since on chronically for 3 months    AFLutter  -  s/p ELIZABETH/DCCV 11/4/22, currently junctional rhythm/ectopic atrial rhythm 40s - patient reports feeling ?weak  - Discontinue Metoprolol and Cardizem  - Adjust Amiodarone load to 200 mg BID for now  -as per EP , no PPM at present , pt can be dced   MVR/TV replacement off AC  - echo technically difficult, unable to assess due to habitus and positioning  - per chart review under last name Nancy MRN [id number] - mechanical valve replacement 1999, was previously on coumadin but stopped in 09/2021 after worsening anemia, concern for GI bleed & epistaxis.   - continue with ac  -  ENDO  - A1c 10.7  - FS ~100-130  -adjust insulin for optimal blood sugar control    Hyperkalemia- resolved    waiting for MILLIE

## 2022-11-12 NOTE — PROGRESS NOTE ADULT - ASSESSMENT
71 y/o F with pmhx of COPD on home O2(3L) with severe pHTN, MVR/TVR , AF s/p ablation,  OHS/MIGUEL on home biPAP, HTN, HLD who presents with one week of vomiting with poor oral intake also complaining of SOB and chest pain.  She also states she has been taking prednisone on/off for the past 3 months for chest tightness and does endorse some weight gain.     #PULM  - Currently on home 3-4 L NC, No exp wheezing   - CXR likely fluid OL  - Continue proair inhalation  - c/w prednisone 5mg since on chronically for 3 months    AFLutter  -  s/p ELIZABETH/DCCV 11/4/22, currently junctional rhythm/ectopic atrial rhythm 40s - patient reports feeling ?weak  - Discontinue Metoprolol and Cardizem  - Adjust Amiodarone load to 200 mg BID for now  - s/p ELIZABETH/DCCV 11/4/22, currently junctional rhythm/ectopic atrial rhythm 50s - patient reports chronic dyspnea but no light-headedness, chest pain, palpitations   - Currently on Amiodarone 200 mg BID, will need close F/U of pulmonary status but okay to continue with underlying COPD   - Continue to dose Coumadin, INR today pending   - HRs improving, will monitor off of Diltiazem and Metoprolol; no plan for PPM at this time    MVR/TV replacement off AC  - echo technically difficult, unable to assess due to habitus and positioning  - per chart review under last name Nancy MRN [id number] - mechanical valve replacement 1999, was previously on coumadin but stopped in 09/2021 after worsening anemia, concern for GI bleed & epistaxis.   - continue with ac  -  ENDO  - A1c 10.7  - FS ~100-130  - Continue home Lantus and premeal, adjust as appropriate

## 2022-11-12 NOTE — PROGRESS NOTE ADULT - ASSESSMENT
72 year old Female with history of COPD, CHF presents with SOB. Nephrology consulted for elevated Scr.    1) MISAEL: likely hemodynamically mediated in setting of aflutter and HF with recurrent MISAEL post-cardioversion with bradycardia. Scr improving. Continue with supportive care. UA bland with hyaline casts. FeNa elevated however drawn on diuretics (not accurate). CT without hydronephrosis. Avoid nephrotoxins.    2) CKD-3b: Baseline Scr 1.4-1.6 with CKD likely due to DM. Outpatient CKD work up. Monitor electrolytes.    3) HTN with CKD: BP controlled. Bradycardia as per cardiology/EP. Monitor BP.    4) LE edema: Improving. Continue with bumex 2 mg PO daily. Mild rales at left base- will give an extra PO dose tonight. TTE with mild to moderate LVSD. Monitor UO.    5) Hypercalcemia: Secondary to primary HPT. Defer sensipar given resolution of hypercalcemia. Monitor serum calcium.    6) Anemia of renal disease: Hb low with iron deficiency s/p venofer. Monitor Hb.    7) L renal mass: Not visualized on CT. Outpatient Urology evaluation.      [facility]  Rodrigue Amador M.D.  Rob Perez D.O.  Ana Song M.D.  Lucrecia López, MSN, ANP-C    Telephone: [phone]  Facsimile: [phone]    [id number] [id number]

## 2022-11-13 LAB
APTT BLD: 46.6 SEC — HIGH (ref 27.5–35.5)
GLUCOSE BLDC GLUCOMTR-MCNC: 133 MG/DL — HIGH (ref 70–99)
GLUCOSE BLDC GLUCOMTR-MCNC: 141 MG/DL — HIGH (ref 70–99)
GLUCOSE BLDC GLUCOMTR-MCNC: 152 MG/DL — HIGH (ref 70–99)
GLUCOSE BLDC GLUCOMTR-MCNC: 208 MG/DL — HIGH (ref 70–99)
INR BLD: 2.97 RATIO — HIGH (ref 0.88–1.16)
PROTHROM AB SERPL-ACNC: 34.5 SEC — HIGH (ref 10.5–13.4)

## 2022-11-13 RX ORDER — WARFARIN SODIUM 2.5 MG/1
3 TABLET ORAL AT BEDTIME
Refills: 0 | Status: COMPLETED | OUTPATIENT
Start: 2022-11-13 | End: 2022-11-13

## 2022-11-13 RX ADMIN — SENNA PLUS 2 TABLET(S): 8.6 TABLET ORAL at 21:04

## 2022-11-13 RX ADMIN — WARFARIN SODIUM 3 MILLIGRAM(S): 2.5 TABLET ORAL at 21:04

## 2022-11-13 RX ADMIN — Medication 4: at 17:53

## 2022-11-13 RX ADMIN — INSULIN GLARGINE 12 UNIT(S): 100 INJECTION, SOLUTION SUBCUTANEOUS at 21:03

## 2022-11-13 RX ADMIN — Medication 5 MILLIGRAM(S): at 05:10

## 2022-11-13 RX ADMIN — AMIODARONE HYDROCHLORIDE 200 MILLIGRAM(S): 400 TABLET ORAL at 05:10

## 2022-11-13 RX ADMIN — PANTOPRAZOLE SODIUM 40 MILLIGRAM(S): 20 TABLET, DELAYED RELEASE ORAL at 05:10

## 2022-11-13 RX ADMIN — ISOSORBIDE MONONITRATE 30 MILLIGRAM(S): 60 TABLET, EXTENDED RELEASE ORAL at 12:52

## 2022-11-13 RX ADMIN — SIMVASTATIN 10 MILLIGRAM(S): 20 TABLET, FILM COATED ORAL at 21:05

## 2022-11-13 RX ADMIN — BUMETANIDE 2 MILLIGRAM(S): 0.25 INJECTION INTRAMUSCULAR; INTRAVENOUS at 05:10

## 2022-11-13 NOTE — PROGRESS NOTE ADULT - SUBJECTIVE AND OBJECTIVE BOX
Patient is a 72y Female     Patient is a 72y old  Female who presents with a chief complaint of Vomiting (13 Nov 2022 08:55)      HPI:  71 y/o F with pmhx of COPD on home O2(3L) with severe pHTN, MVR/TVR , AF s/p ablation,  OHS/MIGUEL on home biPAP, HTN, HLD who presents with one week of vomiting with poor oral intake also complaining of SOB and chest pain.  She also states she has been taking prednisone on/off for the past 3 months for chest tightness and does endorse some weight gain.     In the ED pt found to have A Flutter and given Amio 400 po and Dig loaded.  (23 Oct 2022 04:23)      PAST MEDICAL & SURGICAL HISTORY:  Atrial fibrillation  S/P Ablation      Pulmonary hypertension      MIGUEL (obstructive sleep apnea)      COPD (chronic obstructive pulmonary disease)  on home O2      CHF (congestive heart failure)      HTN (hypertension)      Gout      Mitral valve replaced      Tricuspid valve replaced      CHF (congestive heart failure)      Hypertension      COPD (chronic obstructive pulmonary disease)      History of mitral valve replacement with mechanical valve      Tricuspid valve replaced  mechanical          MEDICATIONS  (STANDING):  aMIOdarone    Tablet 200 milliGRAM(s) Oral daily  buMETAnide 2 milliGRAM(s) Oral daily  insulin glargine Injectable (LANTUS) 12 Unit(s) SubCutaneous at bedtime  insulin lispro (ADMELOG) corrective regimen sliding scale   SubCutaneous at bedtime  insulin lispro (ADMELOG) corrective regimen sliding scale   SubCutaneous three times a day before meals  isosorbide   mononitrate ER Tablet (IMDUR) 30 milliGRAM(s) Oral daily  lidocaine   4% Patch 1 Patch Transdermal daily  pantoprazole    Tablet 40 milliGRAM(s) Oral before breakfast  polyethylene glycol 3350 17 Gram(s) Oral daily  predniSONE   Tablet 5 milliGRAM(s) Oral daily  senna 2 Tablet(s) Oral at bedtime  simvastatin 10 milliGRAM(s) Oral at bedtime      Allergies    No Known Allergies    Intolerances        SOCIAL HISTORY:  Denies ETOh,Smoking,     FAMILY HISTORY:  Family history of hypertension (Father, Sibling)    Family history of stroke    Family history of hypertension (Mother)        REVIEW OF SYSTEMS:    CONSTITUTIONAL: No weakness, fevers or chills  EYES/ENT: No visual changes;  No vertigo or throat pain   NECK: No pain or stiffness  RESPIRATORY: No cough, wheezing, hemoptysis; No shortness of breath  CARDIOVASCULAR: No chest pain or palpitations  GASTROINTESTINAL: No abdominal or epigastric pain. No nausea, vomiting, or hematemesis; No diarrhea or constipation. No melena or hematochezia.  GENITOURINARY: No dysuria, frequency or hematuria  NEUROLOGICAL: No numbness or weakness  SKIN: No itching, burning, rashes, or lesions   All other review of systems is negative unless indicated above.    VITAL:  T(C): , Max: 36.7 (11-13-22 @ 04:33)  T(F): , Max: 98.1 (11-13-22 @ 04:33)  HR: 71 (11-13-22 @ 11:42)  BP: 125/72 (11-13-22 @ 11:42)  BP(mean): --  RR: 18 (11-13-22 @ 11:42)  SpO2: 97% (11-13-22 @ 11:42)  Wt(kg): --    I and O's:    11-11 @ 07:01  -  11-12 @ 07:00  --------------------------------------------------------  IN: 520 mL / OUT: 1400 mL / NET: -880 mL    11-12 @ 07:01  -  11-13 @ 07:00  --------------------------------------------------------  IN: 1710 mL / OUT: 2825 mL / NET: -1115 mL    11-13 @ 07:01  -  11-13 @ 13:24  --------------------------------------------------------  IN: 0 mL / OUT: 600 mL / NET: -600 mL          PHYSICAL EXAM:    Constitutional: NAD  HEENT: PERRLA,   Neck: No JVD  Respiratory: CTA B/L  Cardiovascular: S1 and S2  Gastrointestinal: BS+, soft, NT/ND  Extremities: No peripheral edema  Neurological: A/O x 3, no focal deficits  Psychiatric: Normal mood, normal affect  : No Junior  Skin: No rashes  Access: Not applicable  Back: No CVA tenderness    LABS:    11-12    142  |  97  |  62<H>  ----------------------------<  83  4.4   |  35<H>  |  2.05<H>    Ca    9.8      12 Nov 2022 07:09            RADIOLOGY & ADDITIONAL STUDIES:

## 2022-11-13 NOTE — PROGRESS NOTE ADULT - ASSESSMENT
72 year old Female with history of COPD, CHF presents with SOB. Nephrology consulted for elevated Scr.    1) MISAEL: likely hemodynamically mediated in setting of aflutter and HF with recurrent MISAEL post-cardioversion with bradycardia. Scr improving. Continue with supportive care. UA bland with hyaline casts. FeNa elevated however drawn on diuretics (not accurate). CT without hydronephrosis. Avoid nephrotoxins.    2) CKD-3b: Baseline Scr 1.4-1.6 with CKD likely due to DM. Outpatient CKD work up. Monitor electrolytes.    3) HTN with CKD: BP controlled. Bradycardia as per cardiology/EP. Monitor BP.    4) LE edema: Improving. Continue with bumex 2 mg PO daily. s/p extra bumex 2mg PO x1 on 11/12.  TTE with mild to moderate LVSD. Monitor UO.    5) Hypercalcemia: Secondary to primary HPT. Defer sensipar given resolution of hypercalcemia. Monitor serum calcium.    6) Anemia of renal disease: Hb low with iron deficiency s/p venofer. Monitor Hb.    7) L renal mass: Not visualized on CT. Outpatient Urology evaluation.      [facility] NEPHROLOGY  Rodrigue Amador M.D.  Rob Perez D.O.  Ana Song M.D.  Lucrecia López, MSN, ANP-C    Telephone: [phone]  Facsimile: [phone]    [id number] [id number]

## 2022-11-13 NOTE — PROGRESS NOTE ADULT - ASSESSMENT
71 y/o F with pmhx of COPD on home O2(3L) with severe pHTN, MVR/TVR , AF s/p ablation,  OHS/MIGUEL on home biPAP, HTN, HLD who presents with one week of vomiting with poor oral intake also complaining of SOB and chest pain.  She also states she has been taking prednisone on/off for the past 3 months for chest tightness and does endorse some weight gain.     #PULM  - Currently on home 3-4 L NC, No exp wheezing   - CXR likely fluid OL  - Continue proair inhalation  - c/w prednisone 5mg since on chronically for 3 months    AFLutter  -  s/p ELIZABETH/DCCV 11/4/22, currently junctional rhythm/ectopic atrial rhythm 40s - patient reports feeling ?weak  - Discontinue Metoprolol and Cardizem  - Adjust Amiodarone load to 200 mg BID for now  - s/p ELIZABETH/DCCV 11/4/22, currently junctional rhythm/ectopic atrial rhythm 50s - patient reports chronic dyspnea but no light-headedness, chest pain, palpitations   - Currently on Amiodarone 200 mg BID, will need close F/U of pulmonary status but okay to continue with underlying COPD   - Continue to dose Coumadin, INR today pending   - HRs improving, will monitor off of Diltiazem and Metoprolol; no plan for PPM at this time    MVR/TV replacement off AC  - echo technically difficult, unable to assess due to habitus and positioning  - per chart review under last name Nancy MRN [id number] - mechanical valve replacement 1999, was previously on coumadin but stopped in 09/2021 after worsening anemia, concern for GI bleed & epistaxis.   - continue with ac  -  ENDO  - A1c 10.7  - FS ~100-130  - Continue home Lantus and premeal, adjust as appropriate

## 2022-11-13 NOTE — PROGRESS NOTE ADULT - SUBJECTIVE AND OBJECTIVE BOX
PATIENT SEEN AND EXAMINED ON :- 11/13/22  DATE OF SERVICE:     11/13/22        Interim events noted,Labs ,Radiological studies and Cardiology tests reviewed .       HOSPITAL COURSE: HPI:  71 y/o F with pmhx of COPD on home O2(3L) with severe pHTN, MVR/TVR , AF s/p ablation,  OHS/MIGUEL on home biPAP, HTN, HLD who presents with one week of vomiting with poor oral intake also complaining of SOB and chest pain.  She also states she has been taking prednisone on/off for the past 3 months for chest tightness and does endorse some weight gain.     In the ED pt found to have A Flutter and given Amio 400 po and Dig loaded.  (23 Oct 2022 04:23)      INTERIM EVENTS:Patient seen at bedside ,interim events noted.      PMH -reviewed admission note, no change since admission  HEART FAILURE: Acute[ ]Chronic[ ] Systolic[ ] Diastolic[ ] Combined Systolic and Diastolic[ ]  CAD[ ] CABG[ ] PCI[ ]  DEVICES[ ] PPM[ ] ICD[ ] ILR[ ]  ATRIAL FIBRILLATION[ ] Paroxysmal[ ] Permanent[ ] CHADS2-[  ]  MISAEL[ ] CKD1[ ] CKD2[ ] CKD3[ ] CKD4[ ] ESRD[ ]  COPD[ ] HTN[ ]   DM[ ] Type1[ ] Type 2[ ]   CVA[ ] Paresis[ ]    AMBULATION: Assisted[ ] Cane/walker[ ] Independent[ ]    MEDICATIONS  (STANDING):  aMIOdarone    Tablet 200 milliGRAM(s) Oral daily  buMETAnide 2 milliGRAM(s) Oral daily  insulin glargine Injectable (LANTUS) 12 Unit(s) SubCutaneous at bedtime  insulin lispro (ADMELOG) corrective regimen sliding scale   SubCutaneous at bedtime  insulin lispro (ADMELOG) corrective regimen sliding scale   SubCutaneous three times a day before meals  isosorbide   mononitrate ER Tablet (IMDUR) 30 milliGRAM(s) Oral daily  lidocaine   4% Patch 1 Patch Transdermal daily  pantoprazole    Tablet 40 milliGRAM(s) Oral before breakfast  polyethylene glycol 3350 17 Gram(s) Oral daily  predniSONE   Tablet 5 milliGRAM(s) Oral daily  senna 2 Tablet(s) Oral at bedtime  simvastatin 10 milliGRAM(s) Oral at bedtime  warfarin 3 milliGRAM(s) Oral at bedtime    MEDICATIONS  (PRN):  acetaminophen     Tablet .. 650 milliGRAM(s) Oral every 6 hours PRN Temp greater or equal to 38C (100.4F), Mild Pain (1 - 3)  ALBUTerol    90 MICROgram(s) HFA Inhaler 2 Puff(s) Inhalation every 6 hours PRN Bronchospasm  melatonin 3 milliGRAM(s) Oral at bedtime PRN Insomnia        REVIEW OF SYSTEMS:  Constitutional: [ ] fever, [ ]weight loss,  [ ]fatigue [ ]weight gain  Eyes: [ ] visual changes  Respiratory: [ ]shortness of breath;  [ ] cough, [ ]wheezing, [ ]chills, [ ]hemoptysis  Cardiovascular: [ ] chest pain, [ ]palpitations, [ ]dizziness,  [ ]leg swelling[ ]orthopnea[ ]PND  Gastrointestinal: [ ] abdominal pain, [ ]nausea, [ ]vomiting,  [ ]diarrhea [ ]Constipation [ ]Melena  Genitourinary: [ ] dysuria, [ ] hematuria [ ]Junior  Neurologic: [ ] headaches [ ] tremors[ ]weakness [ ]Paralysis Right[ ] Left[ ]  Skin: [ ] itching, [ ]burning, [ ] rashes  Endocrine: [ ] heat or cold intolerance  Musculoskeletal: [ ] joint pain or swelling; [ ] muscle, back, or extremity pain  Psychiatric: [ ] depression, [ ]anxiety, [ ]mood swings, or [ ]difficulty sleeping  Hematologic: [ ] easy bruising, [ ] bleeding gums    [ ] All remaining systems negative except as per above.   [ ]Unable to obtain.  [x] No change in ROS since admission      Vital Signs Last 24 Hrs  T(C): 36.3 (13 Nov 2022 11:42), Max: 36.7 (13 Nov 2022 04:33)  T(F): 97.3 (13 Nov 2022 11:42), Max: 98.1 (13 Nov 2022 04:33)  HR: 71 (13 Nov 2022 11:42) (62 - 72)  BP: 125/72 (13 Nov 2022 11:42) (114/66 - 145/79)  BP(mean): --  RR: 18 (13 Nov 2022 11:42) (18 - 20)  SpO2: 97% (13 Nov 2022 11:42) (96% - 100%)    Parameters below as of 13 Nov 2022 11:42  Patient On (Oxygen Delivery Method): nasal cannula      I&O's Summary    12 Nov 2022 07:01  -  13 Nov 2022 07:00  --------------------------------------------------------  IN: 1710 mL / OUT: 2825 mL / NET: -1115 mL    13 Nov 2022 07:01  -  13 Nov 2022 19:26  --------------------------------------------------------  IN: 0 mL / OUT: 600 mL / NET: -600 mL        PHYSICAL EXAM:  General: No acute distress BMI-  HEENT: EOMI, PERRL  Neck: Supple, [ ] JVD  Lungs: Equal air entry bilaterally; [ ] rales [ ] wheezing [ ] rhonchi  Heart: Regular rate and rhythm; [x ] murmur   2/6 [ x] systolic [ ] diastolic [ ] radiation[ ] rubs [ ]  gallops  Abdomen: Nontender, bowel sounds present  Extremities: No clubbing, cyanosis, [ ] edema [ ]Pulses  equal and intact  Nervous system:  Alert & Oriented X3, no focal deficits  Psychiatric: Normal affect  Skin: No rashes or lesions    LABS:  11-12    142  |  97  |  62<H>  ----------------------------<  83  4.4   |  35<H>  |  2.05<H>    Ca    9.8      12 Nov 2022 07:09      Creatinine Trend: 2.05<--, 2.47<--, 2.83<--, 3.24<--, 3.34<--, 2.85<--    PT/INR - ( 13 Nov 2022 07:20 )   PT: 34.5 sec;   INR: 2.97 ratio         PTT - ( 13 Nov 2022 07:20 )  PTT:46.6 sec    < from: Transesophageal Echocardiogram w/o TTE (11.02.22 @ 18:09) >    Patient name: DAMARI GUERRERO  YOB: 1950   Age: 72 (F)   MR#: [id number]  Study Date: 11/2/2022  Location: [address] [place], [place] [id number]: Khai Gordon MD  Study quality: Technically good  Referring Physician: Renny Newman MD  Blood Pressure: 160/92 mmHg  Height: 163 cm  Weight: 116 kg  BSA: 2.2 m2  Heart Rate: 95 mmHg  ------------------------------------------------------------------------  PROCEDURE: Transesophageal (ELIZABETH) was performed.  Informed  consent was first obtained for ELIZABETH. The patient was sedated  - see anesthesia record.  The procedure was monitored with  automatic blood pressure monitoring, ECG tracings and pulse  oximetry. The transesophageal probe was placed in the  esophagus posterior to the heart without complications.  INDICATION: Unspecified atrial fibrillation (I48.91)  ------------------------------------------------------------------------  Dimensions:    Normal Values:  LA:            2.0 - 4.0 cm  Ao:            2.0 - 3.8 cm  SEPTUM:    0.6 - 1.2 cm  PWT:           0.6 - 1.1 cm  LVIDd:         3.0 - 5.6 cm  LVIDs:         1.8 - 4.0 cm  EF (Visual Estimate): na %  ------------------------------------------------------------------------  Observations:  Mitral Valve: Mechanical prosthetic mitral valve  replacement. The mechanical leaflets are opening well.  Aortic Valve/Aorta:  Simple atheroma noted in aortic arch/descending aorta.  Left Atrium: No left atrium or left atrial appendage  thrombus.  Left Ventricle: Endocardium notwell visualized; unable to  evaluate left ventricular systolic function.  Pericardium/Pleura: Normal pericardium with no pericardial  effusion.  ------------------------------------------------------------------------  Conclusions:  1. Mechanical prosthetic mitral valve replacement. The  mechanical leaflets are opening well.  2. No left atrium or left atrial appendage thrombus.  ------------------------------------------------------------------------  Confirmed on  11/4/2022 - 17:35:48 by Stu Domínguez M.D.  ------------------------------------------------------------------------    < end of copied text >

## 2022-11-13 NOTE — PROGRESS NOTE ADULT - SUBJECTIVE AND OBJECTIVE BOX
[facility] NEPHROLOGY- PROGRESS NOTE    72 year old Female with history of COPD, CHF presents with SOB. Nephrology consulted for elevated Scr.    REVIEW OF SYSTEMS:  Gen: no fevers  Cards: no chest pain  Resp: + dyspnea with exertion improving  GI: no nausea or vomiting or diarrhea  Vascular: + LE edema improving    No Known Allergies      Hospital Medications: Medications reviewed    VITALS:  T(F): 98.1 (11-13-22 @ 04:33), Max: 98.1 (11-13-22 @ 04:33)  HR: 62 (11-13-22 @ 05:36)  BP: 114/66 (11-13-22 @ 04:33)  RR: 19 (11-13-22 @ 04:33)  SpO2: 96% (11-13-22 @ 05:36)  Wt(kg): --    11-12 @ 07:01  -  11-13 @ 07:00  --------------------------------------------------------  IN: 1710 mL / OUT: 2825 mL / NET: -1115 mL        PHYSICAL EXAM:    Gen: NAD, calm  Cards: Irregularly irregular, +S1/S2, no M/G/R  Resp: +decreased left base  GI: soft, NT/ND, NABS  Vascular: trace LE edema B/L      LABS:  11-12    142  |  97  |  62<H>  ----------------------------<  83  4.4   |  35<H>  |  2.05<H>    Ca    9.8      12 Nov 2022 07:09      Creatinine Trend: 2.05 <--, 2.47 <--, 2.83 <--    Urine Studies:

## 2022-11-14 ENCOUNTER — APPOINTMENT (OUTPATIENT)
Dept: ENDOCRINOLOGY | Facility: CLINIC | Age: 72
End: 2022-11-14

## 2022-11-14 LAB
GLUCOSE BLDC GLUCOMTR-MCNC: 106 MG/DL — HIGH (ref 70–99)
GLUCOSE BLDC GLUCOMTR-MCNC: 135 MG/DL — HIGH (ref 70–99)
GLUCOSE BLDC GLUCOMTR-MCNC: 143 MG/DL — HIGH (ref 70–99)
GLUCOSE BLDC GLUCOMTR-MCNC: 155 MG/DL — HIGH (ref 70–99)
INR BLD: 3.09 RATIO — HIGH (ref 0.88–1.16)
PROTHROM AB SERPL-ACNC: 35.9 SEC — HIGH (ref 10.5–13.4)

## 2022-11-14 PROCEDURE — 99232 SBSQ HOSP IP/OBS MODERATE 35: CPT

## 2022-11-14 RX ORDER — ERYTHROPOIETIN 10000 [IU]/ML
10000 INJECTION, SOLUTION INTRAVENOUS; SUBCUTANEOUS ONCE
Refills: 0 | Status: COMPLETED | OUTPATIENT
Start: 2022-11-14 | End: 2022-11-14

## 2022-11-14 RX ADMIN — INSULIN GLARGINE 12 UNIT(S): 100 INJECTION, SOLUTION SUBCUTANEOUS at 23:01

## 2022-11-14 RX ADMIN — ERYTHROPOIETIN 10000 UNIT(S): 10000 INJECTION, SOLUTION INTRAVENOUS; SUBCUTANEOUS at 13:51

## 2022-11-14 RX ADMIN — AMIODARONE HYDROCHLORIDE 200 MILLIGRAM(S): 400 TABLET ORAL at 05:28

## 2022-11-14 RX ADMIN — LIDOCAINE 1 PATCH: 4 CREAM TOPICAL at 12:02

## 2022-11-14 RX ADMIN — Medication 650 MILLIGRAM(S): at 18:49

## 2022-11-14 RX ADMIN — Medication 5 MILLIGRAM(S): at 05:28

## 2022-11-14 RX ADMIN — PANTOPRAZOLE SODIUM 40 MILLIGRAM(S): 20 TABLET, DELAYED RELEASE ORAL at 05:28

## 2022-11-14 RX ADMIN — Medication 2: at 17:56

## 2022-11-14 RX ADMIN — BUMETANIDE 2 MILLIGRAM(S): 0.25 INJECTION INTRAMUSCULAR; INTRAVENOUS at 05:27

## 2022-11-14 RX ADMIN — SIMVASTATIN 10 MILLIGRAM(S): 20 TABLET, FILM COATED ORAL at 23:01

## 2022-11-14 RX ADMIN — ISOSORBIDE MONONITRATE 30 MILLIGRAM(S): 60 TABLET, EXTENDED RELEASE ORAL at 12:16

## 2022-11-14 NOTE — CHART NOTE - NSCHARTNOTEFT_GEN_A_CORE
Nutrition Follow Up Note  Patient seen for: follow up on 3 DSU    Chart reviewed, events noted.  P2P New AF DCCV , Bradycardia; PPM on hold for now. CHF exac s/p bumex gtt-->PO 10/26, worsening MISAEL, aflutter/mech valv on hep gtt-->coumadin bridge, home 2L; awaiting  auth P2P    Patient remains acute on telemetry, on Oxygen via NC, CPAP  at night, pending peer to peer by attending for transfer to [facility].    Source: [x] Patient       [x] EMR        [] RN        [] Family at bedside       [] Other:    -If unable to interview patient: [] Trach/Vent/BiPAP  [] Disoriented/confused/inappropriate to interview    Diet Order:   Diet, Consistent Carbohydrate w/Evening Snack:   DASH/TLC {Sodium & Cholesterol Restricted} (DASH)  No Concentrated Potassium  Supplement Feeding Modality:  Oral  Suplena Cans or Servings Per Day:  1       Frequency:  Daily (22)    - Is current order appropriate/adequate? [x] Yes  []  No:     - PO intake meals :   [] >75%  Adequate    [] 50-75%  Fair       [x] <50%  Poor as per pt , flow sheet indicates >50%  - PO intake of supplements if pt receiving: [x]>75% []50% []25%   as per   []flow sheet  [x]patient  []family/aide  []PCA  []Nurse  []RD observation    - Nutrition-related concerns: poor intake    pt seen by SLP []Yes [x]No    GI:  Last BM ___.   Bowel Regimen? [x] Yes -senna , Miralax  [] No      Weights:   Daily Weight in k.2 (11-14), Weight in k.4 (11-13), Weight in k.5 (11-12), Weight in k (11-11), Weight in k.2 (11-10), Weight in k.4 (-09), Weight in k.1 (-08)    Nutritionally Pertinent MEDICATIONS  (STANDING):  aMIOdarone    Tablet  buMETAnide  insulin glargine Injectable (LANTUS)  insulin lispro (ADMELOG) corrective regimen sliding scale  insulin lispro (ADMELOG) corrective regimen sliding scale  isosorbide   mononitrate ER Tablet (IMDUR)  pantoprazole    Tablet  polyethylene glycol 3350  predniSONE   Tablet  senna  simvastatin    Pertinent Labs:   A1C with Estimated Average Glucose Result: 6.6 % (10-25-22 @ 04:54)  A1C with Estimated Average Glucose Result: 10.7 % (22 @ 23:18)    Finger Sticks:  POCT Blood Glucose.: 135 mg/dL ( @ 13:06)  POCT Blood Glucose.: 106 mg/dL ( @ 09:15)  POCT Blood Glucose.: 152 mg/dL ( @ 22:17)  POCT Blood Glucose.: 208 mg/dL ( @ 17:19)      Pressure Injuries as per nursing documentation: none  Edema: +1 bilateral feet    Estimated Needs:   [x] no change since previous assessment  [] recalculated:     Weight used for calculations	IBW +10%  Estimated Energy Needs Weight (lbs)	132 lb  Estimated Energy Needs Weight (kg)	59.8 kg  Estimated Energy Needs From (prem/kg)	25  Estimated Energy Needs To (prem/kg)	30  Estimated Energy Needs Calculated From (prem/kg)	1495  Estimated Energy Needs Calculated To (prem/kg)	1794  Weight used for calculations	IBW  Estimated Protein Needs Weight (lbs)	132 lb  Estimated Protein Needs Weight (kg)	59.8 kg  Estimated Protein Needs From (g/kg)	0.8  Estimated Protein Needs To (g/kg)	1  Estimated Protein Needs Calculated From (g/kg)	47.84  Estimated Protein Needs Calculated To (g/kg)	59.8  Other Calculations	fluids deferred to MD due to HF        Previous Nutrition Diagnosis: Overweight/Obesity, Altered Nutrition Related Lab Values  Nutrition Diagnosis is: [x] ongoing  [] resolved [] not applicable     New Nutrition Diagnosis: [x] Not applicable      Nutrition Care Plan:  [] In Progress  [x] Achieved  [] Not applicable    Nutrition Interventions:     Education Provided:       [x] Yes:  [] No:   pt was educated on the importance of supplements to increase calorie and protein intake in light of RD's nutritional findings [x]Yes []N/A-supplement         Recommendations:         [x] Continue current diet order     [] Change diet to:      [x] continue oral nutrition supplement:        [] Add micronutrient supplementation:      [] Continue current micronutrient supplementation:        []Discussed recommendations with provider     [] Needed to escalate to provider     []Placed pending verification with NP/PA      []Placed pending verification with Team      []Placed sticker (malnutrition/BMI/underweight)     []Recommend swallow evaluation     [] monitor need for diet ed reinforcement     [] Other:     Monitoring and Evaluation:   Continue to monitor nutritional intake, tolerance to diet prescription, weights, labs, skin integrity      RD remains available upon request and will follow up per protocol  Ailin Francois MA, MARY, CDN #[id number] Nutrition Follow Up Note  Patient seen for: follow up on 3 DSU    Chart reviewed, events noted.  P2P New AF DCCV , Bradycardia; PPM on hold for now. CHF exac s/p bumex gtt-->PO 10/26, worsening MISAEL, aflutter/mech valv on hep gtt-->coumadin bridge, home 2L; awaiting  auth P2P    Patient remains acute on telemetry, on Oxygen via NC, CPAP  at night, pending peer to peer by attending for transfer to [facility].    Source: [x] Patient       [x] EMR        [] RN        [] Family at bedside       [] Other:    -If unable to interview patient: [] Trach/Vent/BiPAP  [] Disoriented/confused/inappropriate to interview    Diet Order:   Diet, Consistent Carbohydrate w/Evening Snack:   DASH/TLC {Sodium & Cholesterol Restricted} (DASH)  No Concentrated Potassium  Supplement Feeding Modality:  Oral  Suplena Cans or Servings Per Day:  1       Frequency:  Daily (22)    - Is current order appropriate/adequate? [x] Yes  []  No:     - PO intake meals :   [] >75%  Adequate    [] 50-75%  Fair       [x] <50%  Poor as per pt , flow sheet indicates >50%  - PO intake of supplements if pt receiving: [x]>75% []50% []25%   as per   []flow sheet  [x]patient  []family/aide  []PCA  []Nurse  []RD observation    - Nutrition-related concerns: poor intake    pt seen by SLP []Yes [x]No    GI:  Last BM ___.   Bowel Regimen? [x] Yes -senna , Miralax  [] No      Weights:   Daily Weight in k.2 (11-14), Weight in k.4 (11-13), Weight in k.5 (11-12), Weight in k (11-11), Weight in k.2 (11-10), Weight in k.4 (-09), Weight in k.1 (-08)    Nutritionally Pertinent MEDICATIONS  (STANDING):  aMIOdarone    Tablet  buMETAnide  insulin glargine Injectable (LANTUS)  insulin lispro (ADMELOG) corrective regimen sliding scale  insulin lispro (ADMELOG) corrective regimen sliding scale  isosorbide   mononitrate ER Tablet (IMDUR)  pantoprazole    Tablet  polyethylene glycol 3350  predniSONE   Tablet  senna  simvastatin    Pertinent Labs:   A1C with Estimated Average Glucose Result: 6.6 % (10-25-22 @ 04:54)  A1C with Estimated Average Glucose Result: 10.7 % (22 @ 23:18)    Finger Sticks:  POCT Blood Glucose.: 135 mg/dL ( @ 13:06)  POCT Blood Glucose.: 106 mg/dL ( @ 09:15)  POCT Blood Glucose.: 152 mg/dL ( @ 22:17)  POCT Blood Glucose.: 208 mg/dL ( @ 17:19)      Pressure Injuries as per nursing documentation: none  Edema: +1 bilateral feet    Estimated Needs:   [x] no change since previous assessment  [] recalculated:     Weight used for calculations	IBW +10%  Estimated Energy Needs Weight (lbs)	132 lb  Estimated Energy Needs Weight (kg)	59.8 kg  Estimated Energy Needs From (prem/kg)	25  Estimated Energy Needs To (prem/kg)	30  Estimated Energy Needs Calculated From (prem/kg)	1495  Estimated Energy Needs Calculated To (prem/kg)	1794  Weight used for calculations	IBW  Estimated Protein Needs Weight (lbs)	132 lb  Estimated Protein Needs Weight (kg)	59.8 kg  Estimated Protein Needs From (g/kg)	0.8  Estimated Protein Needs To (g/kg)	1  Estimated Protein Needs Calculated From (g/kg)	47.84  Estimated Protein Needs Calculated To (g/kg)	59.8  Other Calculations	fluids deferred to MD due to HF        Previous Nutrition Diagnosis: Overweight/Obesity, Altered Nutrition Related Lab Values  Nutrition Diagnosis is: [x] ongoing  [] resolved [] not applicable     New Nutrition Diagnosis: [x] Not applicable      Nutrition Care Plan:  [] In Progress  [x] Achieved  [] Not applicable    Nutrition Interventions:     Education Provided:       [x] Yes:  [] No:   pt was educated on the importance of supplements to increase calorie and protein intake in light of RD's nutritional findings [x]Yes []N/A-supplement  refused ed other than asking for more potatoes-RD provided ed         Recommendations:         [x] Continue current diet order     [] Change diet to:      [x] continue oral nutrition supplement:        [] Add micronutrient supplementation:      [] Continue current micronutrient supplementation:        []Discussed recommendations with provider     [] Needed to escalate to provider     []Placed pending verification with NP/PA      []Placed pending verification with Team      []Placed sticker (malnutrition/BMI/underweight)     []Recommend swallow evaluation     [] monitor need for diet ed reinforcement     [] Other:     Monitoring and Evaluation:   Continue to monitor nutritional intake, tolerance to diet prescription, weights, labs, skin integrity      RD remains available upon request and will follow up per protocol  Ailin Francois MA, RD, CDN #[phone]

## 2022-11-14 NOTE — PROGRESS NOTE ADULT - SUBJECTIVE AND OBJECTIVE BOX
Patient is a 72y Female     Patient is a 72y old  Female who presents with a chief complaint of Vomiting (13 Nov 2022 19:25)      HPI:  73 y/o F with pmhx of COPD on home O2(3L) with severe pHTN, MVR/TVR , AF s/p ablation,  OHS/MIGUEL on home biPAP, HTN, HLD who presents with one week of vomiting with poor oral intake also complaining of SOB and chest pain.  She also states she has been taking prednisone on/off for the past 3 months for chest tightness and does endorse some weight gain.     In the ED pt found to have A Flutter and given Amio 400 po and Dig loaded.  (23 Oct 2022 04:23)      PAST MEDICAL & SURGICAL HISTORY:  Atrial fibrillation  S/P Ablation      Pulmonary hypertension      MIGUEL (obstructive sleep apnea)      COPD (chronic obstructive pulmonary disease)  on home O2      CHF (congestive heart failure)      HTN (hypertension)      Gout      Mitral valve replaced      Tricuspid valve replaced      CHF (congestive heart failure)      Hypertension      COPD (chronic obstructive pulmonary disease)      History of mitral valve replacement with mechanical valve      Tricuspid valve replaced  mechanical          MEDICATIONS  (STANDING):  aMIOdarone    Tablet 200 milliGRAM(s) Oral daily  buMETAnide 2 milliGRAM(s) Oral daily  insulin glargine Injectable (LANTUS) 12 Unit(s) SubCutaneous at bedtime  insulin lispro (ADMELOG) corrective regimen sliding scale   SubCutaneous at bedtime  insulin lispro (ADMELOG) corrective regimen sliding scale   SubCutaneous three times a day before meals  isosorbide   mononitrate ER Tablet (IMDUR) 30 milliGRAM(s) Oral daily  lidocaine   4% Patch 1 Patch Transdermal daily  pantoprazole    Tablet 40 milliGRAM(s) Oral before breakfast  polyethylene glycol 3350 17 Gram(s) Oral daily  predniSONE   Tablet 5 milliGRAM(s) Oral daily  senna 2 Tablet(s) Oral at bedtime  simvastatin 10 milliGRAM(s) Oral at bedtime      Allergies    No Known Allergies    Intolerances        SOCIAL HISTORY:  Denies ETOh,Smoking,     FAMILY HISTORY:  Family history of hypertension (Father, Sibling)    Family history of stroke    Family history of hypertension (Mother)        REVIEW OF SYSTEMS:    CONSTITUTIONAL: No weakness, fevers or chills  EYES/ENT: No visual changes;  No vertigo or throat pain   NECK: No pain or stiffness  RESPIRATORY: No cough, wheezing, hemoptysis; No shortness of breath  CARDIOVASCULAR: No chest pain or palpitations  GASTROINTESTINAL: No abdominal or epigastric pain. No nausea, vomiting, or hematemesis; No diarrhea or constipation. No melena or hematochezia.  GENITOURINARY: No dysuria, frequency or hematuria  NEUROLOGICAL: No numbness or weakness  SKIN: No itching, burning, rashes, or lesions   All other review of systems is negative unless indicated above.    VITAL:  T(C): , Max: 36.4 (11-13-22 @ 21:03)  T(F): , Max: 97.5 (11-13-22 @ 21:03)  HR: 65 (11-14-22 @ 05:02)  BP: 132/70 (11-14-22 @ 05:02)  BP(mean): --  RR: 18 (11-14-22 @ 05:02)  SpO2: 99% (11-14-22 @ 05:02)  Wt(kg): --    I and O's:    11-12 @ 07:01  -  11-13 @ 07:00  --------------------------------------------------------  IN: 1710 mL / OUT: 2825 mL / NET: -1115 mL    11-13 @ 07:01  -  11-14 @ 07:00  --------------------------------------------------------  IN: 0 mL / OUT: 1000 mL / NET: -1000 mL          PHYSICAL EXAM:    Constitutional: NAD  HEENT: PERRLA,   Neck: No JVD  Respiratory: CTA B/L  Cardiovascular: S1 and S2  Gastrointestinal: BS+, soft, NT/ND  Extremities: No peripheral edema  Neurological: A/O x 3, no focal deficits  Psychiatric: Normal mood, normal affect  : No Junior  Skin: No rashes  Access: Not applicable  Back: No CVA tenderness    LABS:                RADIOLOGY & ADDITIONAL STUDIES:

## 2022-11-14 NOTE — PROGRESS NOTE ADULT - SUBJECTIVE AND OBJECTIVE BOX
PATIENT SEEN AND EXAMINED ON :- 11/14/22  DATE OF SERVICE:      11/14/22       Interim events noted,Labs ,Radiological studies and Cardiology tests reviewed        HOSPITAL COURSE: HPI:  73 y/o F with pmhx of COPD on home O2(3L) with severe pHTN, MVR/TVR , AF s/p ablation,  OHS/MIGUEL on home biPAP, HTN, HLD who presents with one week of vomiting with poor oral intake also complaining of SOB and chest pain.  She also states she has been taking prednisone on/off for the past 3 months for chest tightness and does endorse some weight gain.     In the ED pt found to have A Flutter and given Amio 400 po and Dig loaded.  (23 Oct 2022 04:23)      INTERIM EVENTS:Patient seen at bedside ,interim events noted.      PMH -reviewed admission note, no change since admission  HEART FAILURE: Acute[ ]Chronic[ ] Systolic[ ] Diastolic[ ] Combined Systolic and Diastolic[ ]  CAD[ ] CABG[ ] PCI[ ]  DEVICES[ ] PPM[ ] ICD[ ] ILR[ ]  ATRIAL FIBRILLATION[ ] Paroxysmal[ ] Permanent[ ] CHADS2-[  ]  MISAEL[ ] CKD1[ ] CKD2[ ] CKD3[ ] CKD4[ ] ESRD[ ]  COPD[ ] HTN[ ]   DM[ ] Type1[ ] Type 2[ ]   CVA[ ] Paresis[ ]    AMBULATION: Assisted[ ] Cane/walker[ ] Independent[ ]    MEDICATIONS  (STANDING):  aMIOdarone    Tablet 200 milliGRAM(s) Oral daily  buMETAnide 2 milliGRAM(s) Oral daily  insulin glargine Injectable (LANTUS) 12 Unit(s) SubCutaneous at bedtime  insulin lispro (ADMELOG) corrective regimen sliding scale   SubCutaneous at bedtime  insulin lispro (ADMELOG) corrective regimen sliding scale   SubCutaneous three times a day before meals  isosorbide   mononitrate ER Tablet (IMDUR) 30 milliGRAM(s) Oral daily  lidocaine   4% Patch 1 Patch Transdermal daily  pantoprazole    Tablet 40 milliGRAM(s) Oral before breakfast  polyethylene glycol 3350 17 Gram(s) Oral daily  predniSONE   Tablet 5 milliGRAM(s) Oral daily  senna 2 Tablet(s) Oral at bedtime  simvastatin 10 milliGRAM(s) Oral at bedtime    MEDICATIONS  (PRN):  acetaminophen     Tablet .. 650 milliGRAM(s) Oral every 6 hours PRN Temp greater or equal to 38C (100.4F), Mild Pain (1 - 3)  ALBUTerol    90 MICROgram(s) HFA Inhaler 2 Puff(s) Inhalation every 6 hours PRN Bronchospasm  melatonin 3 milliGRAM(s) Oral at bedtime PRN Insomnia            REVIEW OF SYSTEMS:  Constitutional: [ ] fever, [ ]weight loss,  [ ]fatigue [ ]weight gain  Eyes: [ ] visual changes  Respiratory: [ ]shortness of breath;  [ ] cough, [ ]wheezing, [ ]chills, [ ]hemoptysis  Cardiovascular: [ ] chest pain, [ ]palpitations, [ ]dizziness,  [ ]leg swelling[ ]orthopnea[ ]PND  Gastrointestinal: [ ] abdominal pain, [ ]nausea, [ ]vomiting,  [ ]diarrhea [ ]Constipation [ ]Melena  Genitourinary: [ ] dysuria, [ ] hematuria [ ]Junior  Neurologic: [ ] headaches [ ] tremors[ ]weakness [ ]Paralysis Right[ ] Left[ ]  Skin: [ ] itching, [ ]burning, [ ] rashes  Endocrine: [ ] heat or cold intolerance  Musculoskeletal: [ ] joint pain or swelling; [ ] muscle, back, or extremity pain  Psychiatric: [ ] depression, [ ]anxiety, [ ]mood swings, or [ ]difficulty sleeping  Hematologic: [ ] easy bruising, [ ] bleeding gums    [ ] All remaining systems negative except as per above.   [ ]Unable to obtain.  [x] No change in ROS since admission      Vital Signs Last 24 Hrs  T(C): 36.6 (14 Nov 2022 12:13), Max: 36.6 (14 Nov 2022 12:13)  T(F): 97.9 (14 Nov 2022 12:13), Max: 97.9 (14 Nov 2022 12:13)  HR: 67 (14 Nov 2022 12:13) (63 - 67)  BP: 122/68 (14 Nov 2022 12:13) (122/68 - 139/70)  BP(mean): --  RR: 18 (14 Nov 2022 12:13) (18 - 18)  SpO2: 100% (14 Nov 2022 12:13) (98% - 100%)    Parameters below as of 14 Nov 2022 05:02  Patient On (Oxygen Delivery Method): nasal cannula      I&O's Summary    13 Nov 2022 07:01  -  14 Nov 2022 07:00  --------------------------------------------------------  IN: 0 mL / OUT: 1000 mL / NET: -1000 mL        PHYSICAL EXAM:  General: No acute distress BMI-  HEENT: EOMI, PERRL  Neck: Supple, [ ] JVD  Lungs: Equal air entry bilaterally; [ ] rales [ ] wheezing [ ] rhonchi  Heart: Regular rate and rhythm; [x ] murmur   2/6 [ x] systolic [ ] diastolic [ ] radiation[ ] rubs [ ]  gallops  Abdomen: Nontender, bowel sounds present  Extremities: No clubbing, cyanosis, [ ] edema [ ]Pulses  equal and intact  Nervous system:  Alert & Oriented X3, no focal deficits  Psychiatric: Normal affect  Skin: No rashes or lesions    LABS:        Creatinine Trend: 2.05<--, 2.47<--, 2.83<--, 3.24<--, 3.34<--, 2.85<--    PT/INR - ( 14 Nov 2022 12:47 )   PT: 35.9 sec;   INR: 3.09 ratio         PTT - ( 13 Nov 2022 07:20 )  PTT:46.6 sec

## 2022-11-14 NOTE — PROGRESS NOTE ADULT - ASSESSMENT
72 year old Female with history of COPD, CHF presents with SOB. Nephrology consulted for elevated Scr.    1) MISAEL: likely hemodynamically mediated in setting of aflutter and HF with recurrent MISAEL post-cardioversion with bradycardia. Scr improving. Continue with supportive care. UA bland with hyaline casts. FeNa elevated however drawn on diuretics (not accurate). CT without hydronephrosis. Avoid nephrotoxins.    2) CKD-3b: Baseline Scr 1.4-1.6 with CKD likely due to DM. Outpatient CKD work up. Monitor electrolytes.    3) HTN with CKD: BP controlled. Bradycardia as per cardiology/EP. Monitor BP.    4) LE edema: Improving. Continue with bumex 2 mg PO daily and an additional bumex 2 mg QHS prn weight gain. TTE with mild to moderate LVSD. Monitor UO.    5) Hypercalcemia: Secondary to primary HPT. Defer sensipar given resolution of hypercalcemia. Monitor serum calcium.    6) Anemia of renal disease: Hb low with iron deficiency s/p venofer. Will give Epo 10K X 1 dose today. Monitor Hb.    7) L renal mass: Not visualized on CT. Outpatient Urology evaluation.      [facility] NEPHROLOGY  Rodrigue Amador M.D.  Rob Perez D.O.  Ana Song M.D.  Lucrecia López, MSN, ANP-C    Telephone: [phone]  Facsimile: [phone]    [address] [place] [address]

## 2022-11-14 NOTE — PROGRESS NOTE ADULT - ASSESSMENT
71 y/o F with pmhx of COPD on home O2(3L) with severe pHTN, MVR/TVR , AF s/p ablation,  OHS/MIGUEL on home biPAP, HTN, HLD who presents with one week of vomiting with poor oral intake also complaining of SOB and chest pain.  She also states she has been taking prednisone on/off for the past 3 months for chest tightness and does endorse some weight gain.     #PULM  - Currently on home 3-4 L NC, No exp wheezing   - CXR likely fluid OL  - Continue proair inhalation  - c/w prednisone 5mg since on chronically for 3 months    AFLutter  -  s/p ELIZABETH/DCCV 11/4/22, currently junctional rhythm/ectopic atrial rhythm 40s - patient reports feeling ?weak  - Discontinue Metoprolol and Cardizem  - Adjust Amiodarone load to 200 mg BID for now  - s/p ELIZABETH/DCCV 11/4/22, currently junctional rhythm/ectopic atrial rhythm 50s - patient reports chronic dyspnea but no light-headedness, chest pain, palpitations   - Currently on Amiodarone 200 mg BID, will need close F/U of pulmonary status but okay to continue with underlying COPD   - Continue to dose Coumadin, INR today pending   - HRs improving, will monitor off of Diltiazem and Metoprolol; no plan for PPM at this time    MVR/TV replacement off AC  - echo technically difficult, unable to assess due to habitus and positioning  - per chart review under last name Nancy MRN [id number] - mechanical valve replacement 1999, was previously on coumadin but stopped in 09/2021 after worsening anemia, concern for GI bleed & epistaxis.   - continue with ac  TTE: 10/23  1. Mechanical prosthetic mitral valve replacement, which  was poorly visualized.  2. Endocardium not well visualized; grossly mild to  moderate global left ventricular systolic dysfunction.  Endocardial visualization enhanced with intravenous  injection of Ultrasonic Enhancing Agent (Definity).  3. The right ventricle is not well visualized.    -  ENDO  - A1c 10.7  - FS ~100-130  - Continue home Lantus and premeal, adjust as appropriate    - Dispo Planning MILLIE

## 2022-11-14 NOTE — PROGRESS NOTE ADULT - ASSESSMENT
71 y/o F with pmhx of COPD on home O2(3L) with severe pHTN, MVR/TVR , AF s/p ablation,  OHS/MIGUEL on home biPAP, HTN, HLD who presents with one week of vomiting with poor oral intake also complaining of SOB and chest pain.  She also states she has been taking prednisone on/off for the past 3 months for chest tightness and does endorse some weight gain.     In the ED pt found to have A Flutter and given Amio 400 po and Dig loaded.     #PULM  - Currently on home 3-4 L NC, No exp wheezing   - CXR likely fluid OL  - Continue proair inhalation  - c/w prednisone 5mg since on chronically for 3 months    AFLutter  -  s/p ELIZABETH/DCCV 11/4/22, currently junctional rhythm/ectopic atrial rhythm 40s - patient reports feeling ?weak  - Discontinue Metoprolol and Cardizem  - Adjust Amiodarone load to 200 mg BID for now  -as per EP , no PPM at present , pt can be dced   MVR/TV replacement off AC  - echo technically difficult, unable to assess due to habitus and positioning  - per chart review under last name Nancy MRN [id number] - mechanical valve replacement 1999, was previously on coumadin but stopped in 09/2021 after worsening anemia, concern for GI bleed & epistaxis.   - continue with ac  -  ENDO  - A1c 10.7  - FS ~100-130  -adjust insulin for optimal blood sugar control    Hyperkalemia- resolved    waiting for MILLIE

## 2022-11-14 NOTE — PROGRESS NOTE ADULT - SUBJECTIVE AND OBJECTIVE BOX
[facility] NEPHROLOGY- PROGRESS NOTE    72 year old Female with history of COPD, CHF presents with SOB. Nephrology consulted for elevated Scr.    REVIEW OF SYSTEMS:  Gen: no fevers  Cards: no chest pain  Resp: + dyspnea with exertion improving  GI: no nausea or vomiting or diarrhea  Vascular: + LE edema improving    No Known Allergies      Hospital Medications: Medications reviewed      VITALS:  T(F): 97.4 (11-14-22 @ 05:02), Max: 97.5 (11-13-22 @ 21:03)  HR: 63 (11-14-22 @ 08:38)  BP: 132/70 (11-14-22 @ 05:02)  RR: 18 (11-14-22 @ 05:02)  SpO2: 98% (11-14-22 @ 08:38)  Wt(kg): --    11-13 @ 07:01  -  11-14 @ 07:00  --------------------------------------------------------  IN: 0 mL / OUT: 1000 mL / NET: -1000 mL        PHYSICAL EXAM:    Gen: NAD, calm  Cards: Irregularly irregular, +S1/S2, no M/G/R  Resp: +decreased left base  GI: soft, NT/ND, NABS  Vascular: trace LE edema B/L      LABS:        Creatinine Trend: 2.05 <--, 2.47 <--    Urine Studies:

## 2022-11-14 NOTE — PROGRESS NOTE ADULT - SUBJECTIVE AND OBJECTIVE BOX
SUBJECTIVE / OVERNIGHT EVENTS:pt seen and examined    MEDICATIONS  (STANDING):  aMIOdarone    Tablet 200 milliGRAM(s) Oral daily  buMETAnide 2 milliGRAM(s) Oral daily  insulin glargine Injectable (LANTUS) 12 Unit(s) SubCutaneous at bedtime  insulin lispro (ADMELOG) corrective regimen sliding scale   SubCutaneous at bedtime  insulin lispro (ADMELOG) corrective regimen sliding scale   SubCutaneous three times a day before meals  isosorbide   mononitrate ER Tablet (IMDUR) 30 milliGRAM(s) Oral daily  lidocaine   4% Patch 1 Patch Transdermal daily  pantoprazole    Tablet 40 milliGRAM(s) Oral before breakfast  polyethylene glycol 3350 17 Gram(s) Oral daily  predniSONE   Tablet 5 milliGRAM(s) Oral daily  senna 2 Tablet(s) Oral at bedtime  simvastatin 10 milliGRAM(s) Oral at bedtime    MEDICATIONS  (PRN):  acetaminophen     Tablet .. 650 milliGRAM(s) Oral every 6 hours PRN Temp greater or equal to 38C (100.4F), Mild Pain (1 - 3)  ALBUTerol    90 MICROgram(s) HFA Inhaler 2 Puff(s) Inhalation every 6 hours PRN Bronchospasm  melatonin 3 milliGRAM(s) Oral at bedtime PRN Insomnia    Vital Signs Last 24 Hrs  T(C): 36.6 (11-14-22 @ 20:28), Max: 36.6 (11-14-22 @ 12:13)  T(F): 97.9 (11-14-22 @ 20:28), Max: 97.9 (11-14-22 @ 12:13)  HR: 64 (11-14-22 @ 20:28) (63 - 67)  BP: 128/57 (11-14-22 @ 20:28) (122/68 - 132/70)  BP(mean): --  RR: 18 (11-14-22 @ 20:28) (18 - 18)  SpO2: 100% (11-14-22 @ 20:28) (98% - 100%)            Skin: No rash.  Eyes: No recent vision problems or eye pain.  ENT: No congestion, ear pain, or sore throat.  Cardiovascular: No chest pain or palpation.  Respiratory: No cough, shortness of breath, congestion, or wheezing.  Gastrointestinal: No abdominal pain, nausea, vomiting, or diarrhea.  Genitourinary: No dysuria.  Musculoskeletal: No joint swelling.  Neurologic: No headache.    PHYSICAL EXAM:  GENERAL: NAD  EYES: EOMI, PERRLA  NECK: Supple, No JVD  CHEST/LUNG: dec breath sounds at bases  HEART:  S1 , S2 +  ABDOMEN: soft , bs+  EXTREMITIES:  edema+  NEUROLOGY:alert awake oriented    LABS:        Creatinine Trend: 2.05 <--, 2.47 <--    Urine Studies:              PT/INR - ( 14 Nov 2022 12:47 )   PT: 35.9 sec;   INR: 3.09 ratio         PTT - ( 13 Nov 2022 07:20 )  PTT:46.6 sec

## 2022-11-15 LAB
GLUCOSE BLDC GLUCOMTR-MCNC: 106 MG/DL — HIGH (ref 70–99)
GLUCOSE BLDC GLUCOMTR-MCNC: 108 MG/DL — HIGH (ref 70–99)
GLUCOSE BLDC GLUCOMTR-MCNC: 131 MG/DL — HIGH (ref 70–99)
GLUCOSE BLDC GLUCOMTR-MCNC: 176 MG/DL — HIGH (ref 70–99)
INR BLD: 2.73 RATIO — HIGH (ref 0.88–1.16)
PROTHROM AB SERPL-ACNC: 31.7 SEC — HIGH (ref 10.5–13.4)

## 2022-11-15 RX ORDER — WARFARIN SODIUM 2.5 MG/1
3 TABLET ORAL ONCE
Refills: 0 | Status: COMPLETED | OUTPATIENT
Start: 2022-11-15 | End: 2022-11-15

## 2022-11-15 RX ORDER — BUMETANIDE 0.25 MG/ML
2 INJECTION INTRAMUSCULAR; INTRAVENOUS ONCE
Refills: 0 | Status: COMPLETED | OUTPATIENT
Start: 2022-11-15 | End: 2022-11-15

## 2022-11-15 RX ADMIN — POLYETHYLENE GLYCOL 3350 17 GRAM(S): 17 POWDER, FOR SOLUTION ORAL at 12:31

## 2022-11-15 RX ADMIN — PANTOPRAZOLE SODIUM 40 MILLIGRAM(S): 20 TABLET, DELAYED RELEASE ORAL at 06:02

## 2022-11-15 RX ADMIN — ISOSORBIDE MONONITRATE 30 MILLIGRAM(S): 60 TABLET, EXTENDED RELEASE ORAL at 12:31

## 2022-11-15 RX ADMIN — Medication 5 MILLIGRAM(S): at 06:02

## 2022-11-15 RX ADMIN — SIMVASTATIN 10 MILLIGRAM(S): 20 TABLET, FILM COATED ORAL at 22:21

## 2022-11-15 RX ADMIN — WARFARIN SODIUM 3 MILLIGRAM(S): 2.5 TABLET ORAL at 22:21

## 2022-11-15 RX ADMIN — BUMETANIDE 2 MILLIGRAM(S): 0.25 INJECTION INTRAMUSCULAR; INTRAVENOUS at 06:02

## 2022-11-15 RX ADMIN — LIDOCAINE 1 PATCH: 4 CREAM TOPICAL at 12:32

## 2022-11-15 RX ADMIN — BUMETANIDE 2 MILLIGRAM(S): 0.25 INJECTION INTRAMUSCULAR; INTRAVENOUS at 17:49

## 2022-11-15 RX ADMIN — Medication 650 MILLIGRAM(S): at 22:21

## 2022-11-15 RX ADMIN — Medication 2: at 13:51

## 2022-11-15 RX ADMIN — LIDOCAINE 1 PATCH: 4 CREAM TOPICAL at 18:23

## 2022-11-15 RX ADMIN — Medication 650 MILLIGRAM(S): at 23:20

## 2022-11-15 RX ADMIN — INSULIN GLARGINE 12 UNIT(S): 100 INJECTION, SOLUTION SUBCUTANEOUS at 22:21

## 2022-11-15 RX ADMIN — AMIODARONE HYDROCHLORIDE 200 MILLIGRAM(S): 400 TABLET ORAL at 06:02

## 2022-11-15 NOTE — PROGRESS NOTE ADULT - SUBJECTIVE AND OBJECTIVE BOX
SUBJECTIVE / OVERNIGHT EVENTS:pt seen and examined    MEDICATIONS  (STANDING):  aMIOdarone    Tablet 200 milliGRAM(s) Oral daily  buMETAnide 2 milliGRAM(s) Oral daily  insulin glargine Injectable (LANTUS) 12 Unit(s) SubCutaneous at bedtime  insulin lispro (ADMELOG) corrective regimen sliding scale   SubCutaneous at bedtime  insulin lispro (ADMELOG) corrective regimen sliding scale   SubCutaneous three times a day before meals  isosorbide   mononitrate ER Tablet (IMDUR) 30 milliGRAM(s) Oral daily  lidocaine   4% Patch 1 Patch Transdermal daily  pantoprazole    Tablet 40 milliGRAM(s) Oral before breakfast  polyethylene glycol 3350 17 Gram(s) Oral daily  predniSONE   Tablet 5 milliGRAM(s) Oral daily  senna 2 Tablet(s) Oral at bedtime  simvastatin 10 milliGRAM(s) Oral at bedtime  warfarin 3 milliGRAM(s) Oral once    MEDICATIONS  (PRN):  acetaminophen     Tablet .. 650 milliGRAM(s) Oral every 6 hours PRN Temp greater or equal to 38C (100.4F), Mild Pain (1 - 3)  ALBUTerol    90 MICROgram(s) HFA Inhaler 2 Puff(s) Inhalation every 6 hours PRN Bronchospasm  melatonin 3 milliGRAM(s) Oral at bedtime PRN Insomnia    Vital Signs Last 24 Hrs  T(C): 36.6 (11-15-22 @ 20:38), Max: 36.7 (11-15-22 @ 05:32)  T(F): 97.9 (11-15-22 @ 20:38), Max: 98 (11-15-22 @ 05:32)  HR: 65 (11-15-22 @ 20:38) (61 - 65)  BP: 117/68 (11-15-22 @ 20:38) (117/68 - 162/72)  BP(mean): --  RR: 18 (11-15-22 @ 20:38) (18 - 18)  SpO2: 98% (11-15-22 @ 20:38) (96% - 99%)            Skin: No rash.  Eyes: No recent vision problems or eye pain.  ENT: No congestion, ear pain, or sore throat.  Cardiovascular: No chest pain or palpation.  Respiratory: No cough, shortness of breath, congestion, or wheezing.  Gastrointestinal: No abdominal pain, nausea, vomiting, or diarrhea.  Genitourinary: No dysuria.  Musculoskeletal: No joint swelling.  Neurologic: No headache.    PHYSICAL EXAM:  GENERAL: NAD  EYES: EOMI, PERRLA  NECK: Supple, No JVD  CHEST/LUNG: dec breath sounds at bases  HEART:  S1 , S2 +  ABDOMEN: soft , bs+  EXTREMITIES:  edema+  NEUROLOGY:alert awake oriented    LABS:        Creatinine Trend: 2.05 <--    Urine Studies:              PT/INR - ( 15 Nov 2022 07:39 )   PT: 31.7 sec;   INR: 2.73 ratio

## 2022-11-15 NOTE — PROGRESS NOTE ADULT - ASSESSMENT
73 y/o F with pmhx of COPD on home O2(3L) with severe pHTN, MVR/TVR , AF s/p ablation,  OHS/MIGUEL on home biPAP, HTN, HLD who presents with one week of vomiting with poor oral intake also complaining of SOB and chest pain.  She also states she has been taking prednisone on/off for the past 3 months for chest tightness and does endorse some weight gain.     In the ED pt found to have A Flutter and given Amio 400 po and Dig loaded.     #PULM  - Currently on home 3-4 L NC, No exp wheezing   - CXR likely fluid OL  - Continue proair inhalation  - c/w prednisone 5mg since on chronically for 3 months    AFLutter  -  s/p ELIZABETH/DCCV 11/4/22, currently junctional rhythm/ectopic atrial rhythm 40s - patient reports feeling ?weak  - Discontinue Metoprolol and Cardizem  - Adjust Amiodarone load to 200 mg BID for now  -as per EP , no PPM at present , pt can be dced   MVR/TV replacement off AC  - echo technically difficult, unable to assess due to habitus and positioning  - per chart review under last name Nancy MRN [id number] - mechanical valve replacement 1999, was previously on coumadin but stopped in 09/2021 after worsening anemia, concern for GI bleed & epistaxis.   - continue with ac  -  ENDO  - A1c 10.7  - FS ~100-130  -adjust insulin for optimal blood sugar control    Hyperkalemia- resolved    waiting for MILLIE

## 2022-11-15 NOTE — PROGRESS NOTE ADULT - ASSESSMENT
72 year old Female with history of COPD, CHF presents with SOB. Nephrology consulted for elevated Scr.    1) MISAEL: likely hemodynamically mediated in setting of aflutter and HF with recurrent MISAEL post-cardioversion with bradycardia. Scr improving. Continue with supportive care. UA bland with hyaline casts. FeNa elevated however drawn on diuretics (not accurate). CT without hydronephrosis. Avoid nephrotoxins.    2) CKD-3b: Baseline Scr 1.4-1.6 with CKD likely due to DM. Outpatient CKD work up. Monitor electrolytes.    3) HTN with CKD: BP controlled. Bradycardia as per cardiology/EP. Monitor BP.    4) LE edema: Improving. Continue with bumex 2 mg PO daily and will give an additional bumex 2 mg QHS given weight gain. TTE with mild to moderate LVSD. Monitor UO.    5) Hypercalcemia: Secondary to primary HPT. Defer sensipar given resolution of hypercalcemia. Monitor serum calcium.    6) Anemia of renal disease: Hb low with iron deficiency s/p venofer s/p Epo 10K X 1 dose on 11/14. Monitor Hb.    7) L renal mass: Not visualized on CT. Outpatient Urology evaluation.      [facility] NEPHROLOGY  Rodrigue Amador M.D.  Rob Perez D.O.  Ana Song M.D.  Lucrecia López, MSN, ANP-C    Telephone: [phone]  Facsimile: [phone]    [address] [place] [address]

## 2022-11-15 NOTE — PROGRESS NOTE ADULT - SUBJECTIVE AND OBJECTIVE BOX
[facility] NEPHROLOGY- PROGRESS NOTE    72 year old Female with history of COPD, CHF presents with SOB. Nephrology consulted for elevated Scr.    REVIEW OF SYSTEMS:  Gen: no fevers  Cards: no chest pain  Resp: + dyspnea with exertion improving  GI: no nausea or vomiting or diarrhea  Vascular: + LE edema improving    No Known Allergies      Hospital Medications: Medications reviewed      VITALS:  T(F): 98 (11-15-22 @ 05:32), Max: 98 (11-15-22 @ 05:32)  HR: 61 (11-15-22 @ 09:55)  BP: 150/73 (11-15-22 @ 05:32)  RR: 18 (11-15-22 @ 05:32)  SpO2: 96% (11-15-22 @ 09:55)  Wt(kg): --    11-14 @ 07:01  -  11-15 @ 07:00  --------------------------------------------------------  IN: 0 mL / OUT: 1500 mL / NET: -1500 mL    11-15 @ 07:01  -  11-15 @ 10:50  --------------------------------------------------------  IN: 480 mL / OUT: 0 mL / NET: 480 mL        PHYSICAL EXAM:    Gen: NAD, calm  Cards: Irregularly irregular, +S1/S2, no M/G/R  Resp: +decreased left base  GI: soft, NT/ND, NABS  Vascular: trace LE edema B/L      LABS: N/A

## 2022-11-15 NOTE — PROGRESS NOTE ADULT - TIME BILLING
- Review of records, telemetry, vital signs and daily labs.   - General and cardiovascular physical examination.  - Generation of cardiovascular treatment plan.  - Coordination of care.      Patient was seen and examined by me on 11/5/22,interim events noted,labs and radiology studies reviewed.  Edwar Merida MD,FACC.  [address] [place], [place] [id number].  [phone]
- Review of records, telemetry, vital signs and daily labs.   - General and cardiovascular physical examination.  - Generation of cardiovascular treatment plan.  - Coordination of care.      Patient was seen and examined by me on 11/9/2022interim events noted,labs and radiology studies reviewed.  Edwar Merida MD,FACC.  [address] [place], [place] [id number].  [phone]
- Review of records, telemetry, vital signs and daily labs.   - General and cardiovascular physical examination.  - Generation of cardiovascular treatment plan.  - Coordination of care.      Patient was seen and examined by me on 11/8/2022interim events noted,labs and radiology studies reviewed.  Edwar Merida MD,FACC.  [address] [place], [place] [id number].  [phone]
- Review of records, telemetry, vital signs and daily labs.   - General and cardiovascular physical examination.  - Generation of cardiovascular treatment plan.  - Coordination of care.      Patient was seen and examined by me on 11/10/2022interim events noted,labs and radiology studies reviewed.  Edwar Merida MD,FACC.  [address] [place], [place] [id number].  [phone]
- Review of records, telemetry, vital signs and daily labs.   - General and cardiovascular physical examination.  - Generation of cardiovascular treatment plan.  - Coordination of care.      Patient was seen and examined by me on 11/11/2022interim events noted,labs and radiology studies reviewed.  Edwar Merida MD,FACC.  [address] [place], [place] [id number].  [phone]
- Review of records, telemetry, vital signs and daily labs.   - General and cardiovascular physical examination.  - Generation of cardiovascular treatment plan.  - Coordination of care.      Patient was seen and examined by me on 11/13/2022interim events noted,labs and radiology studies reviewed.  Edwar Merida MD,FACC.  [address] [place], [place] [id number].  [phone]
- Review of records, telemetry, vital signs and daily labs.   - General and cardiovascular physical examination.  - Generation of cardiovascular treatment plan.  - Coordination of care.      Patient was seen and examined by me on 11/12/2022interim events noted,labs and radiology studies reviewed.  Edwar Merida MD,FACC.  [address] [place], [place] [id number].  [phone]
- Review of records, telemetry, vital signs and daily labs.   - General and cardiovascular physical examination.  - Generation of cardiovascular treatment plan.  - Coordination of care.      Patient was seen and examined by me on 11/15/22,interim events noted,labs and radiology studies reviewed.  Edwar Merida MD,FACC.  [address] [place], [place] [id number].  [phone]
- Review of records, telemetry, vital signs and daily labs.   - General and cardiovascular physical examination.  - Generation of cardiovascular treatment plan.  - Coordination of care.      Patient was seen and examined by me on 11/14/2022interim events noted,labs and radiology studies reviewed.  Edwar Merida MD,FACC.  [address] [place], [place] [id number].  [phone]
Management of CHF exacerbation, MISAEL and atrial flutter

## 2022-11-15 NOTE — PROGRESS NOTE ADULT - ASSESSMENT
73 y/o F with pmhx of COPD on home O2(3L) with severe pHTN, MVR/TVR , AF s/p ablation,  OHS/MIGUEL on home biPAP, HTN, HLD who presents with one week of vomiting with poor oral intake also complaining of SOB and chest pain.  She also states she has been taking prednisone on/off for the past 3 months for chest tightness and does endorse some weight gain.     #PULM  - Currently on home 3-4 L NC, No exp wheezing   - CXR likely fluid OL  - Continue proair inhalation  - c/w prednisone 5mg since on chronically for 3 months    AFLutter  -  s/p ELIZABETH/DCCV 11/4/22, currently junctional rhythm/ectopic atrial rhythm 40s - patient reports feeling ?weak  - Discontinue Metoprolol and Cardizem  - Adjust Amiodarone load to 200 mg BID for now  - s/p ELIZABETH/DCCV 11/4/22, currently junctional rhythm/ectopic atrial rhythm 50s - patient reports chronic dyspnea but no light-headedness, chest pain, palpitations   - Currently on Amiodarone 200 mg BID, will need close F/U of pulmonary status but okay to continue with underlying COPD   - Continue to dose Coumadin, INR today pending   - HRs improving, will monitor off of Diltiazem and Metoprolol; no plan for PPM at this time    MVR/TV replacement off AC  - echo technically difficult, unable to assess due to habitus and positioning  - per chart review under last name Nancy MRN [id number] - mechanical valve replacement 1999, was previously on coumadin but stopped in 09/2021 after worsening anemia, concern for GI bleed & epistaxis.   - continue with ac  TTE: 10/23  1. Mechanical prosthetic mitral valve replacement, which  was poorly visualized.  2. Endocardium not well visualized; grossly mild to  moderate global left ventricular systolic dysfunction.  Endocardial visualization enhanced with intravenous  injection of Ultrasonic Enhancing Agent (Definity).  3. The right ventricle is not well visualized.    -  ENDO  - A1c 10.7  - FS ~100-130  - Continue home Lantus and premeal, adjust as appropriate    - Dispo Planning MILLIE

## 2022-11-15 NOTE — PROGRESS NOTE ADULT - SUBJECTIVE AND OBJECTIVE BOX
Patient is a 72y Female     Patient is a 72y old  Female who presents with a chief complaint of Vomiting (14 Nov 2022 15:59)      HPI:  71 y/o F with pmhx of COPD on home O2(3L) with severe pHTN, MVR/TVR , AF s/p ablation,  OHS/MIGUEL on home biPAP, HTN, HLD who presents with one week of vomiting with poor oral intake also complaining of SOB and chest pain.  She also states she has been taking prednisone on/off for the past 3 months for chest tightness and does endorse some weight gain.     In the ED pt found to have A Flutter and given Amio 400 po and Dig loaded.  (23 Oct 2022 04:23)      PAST MEDICAL & SURGICAL HISTORY:  Atrial fibrillation  S/P Ablation      Pulmonary hypertension      MIGUEL (obstructive sleep apnea)      COPD (chronic obstructive pulmonary disease)  on home O2      CHF (congestive heart failure)      HTN (hypertension)      Gout      Mitral valve replaced      Tricuspid valve replaced      CHF (congestive heart failure)      Hypertension      COPD (chronic obstructive pulmonary disease)      History of mitral valve replacement with mechanical valve      Tricuspid valve replaced  mechanical          MEDICATIONS  (STANDING):  aMIOdarone    Tablet 200 milliGRAM(s) Oral daily  buMETAnide 2 milliGRAM(s) Oral daily  insulin glargine Injectable (LANTUS) 12 Unit(s) SubCutaneous at bedtime  insulin lispro (ADMELOG) corrective regimen sliding scale   SubCutaneous at bedtime  insulin lispro (ADMELOG) corrective regimen sliding scale   SubCutaneous three times a day before meals  isosorbide   mononitrate ER Tablet (IMDUR) 30 milliGRAM(s) Oral daily  lidocaine   4% Patch 1 Patch Transdermal daily  pantoprazole    Tablet 40 milliGRAM(s) Oral before breakfast  polyethylene glycol 3350 17 Gram(s) Oral daily  predniSONE   Tablet 5 milliGRAM(s) Oral daily  senna 2 Tablet(s) Oral at bedtime  simvastatin 10 milliGRAM(s) Oral at bedtime      Allergies    No Known Allergies    Intolerances        SOCIAL HISTORY:  Denies ETOh,Smoking,     FAMILY HISTORY:  Family history of hypertension (Father, Sibling)    Family history of stroke    Family history of hypertension (Mother)        REVIEW OF SYSTEMS:    CONSTITUTIONAL: No weakness, fevers or chills  EYES/ENT: No visual changes;  No vertigo or throat pain   NECK: No pain or stiffness  RESPIRATORY: No cough, wheezing, hemoptysis; No shortness of breath  CARDIOVASCULAR: No chest pain or palpitations  GASTROINTESTINAL: No abdominal or epigastric pain. No nausea, vomiting, or hematemesis; No diarrhea or constipation. No melena or hematochezia.  GENITOURINARY: No dysuria, frequency or hematuria  NEUROLOGICAL: No numbness or weakness  SKIN: No itching, burning, rashes, or lesions   All other review of systems is negative unless indicated above.    VITAL:  T(C): , Max: 36.7 (11-15-22 @ 05:32)  T(F): , Max: 98 (11-15-22 @ 05:32)  HR: 63 (11-15-22 @ 05:32)  BP: 150/73 (11-15-22 @ 05:32)  BP(mean): --  RR: 18 (11-15-22 @ 05:32)  SpO2: 98% (11-15-22 @ 05:32)  Wt(kg): --    I and O's:    11-12 @ 07:01  -  11-13 @ 07:00  --------------------------------------------------------  IN: 1710 mL / OUT: 2825 mL / NET: -1115 mL    11-13 @ 07:01  -  11-14 @ 07:00  --------------------------------------------------------  IN: 0 mL / OUT: 1000 mL / NET: -1000 mL    11-14 @ 07:01  -  11-15 @ 06:25  --------------------------------------------------------  IN: 0 mL / OUT: 900 mL / NET: -900 mL          PHYSICAL EXAM:    Constitutional: NAD  HEENT: PERRLA,   Neck: No JVD  Respiratory: CTA B/L  Cardiovascular: S1 and S2  Gastrointestinal: BS+, soft, NT/ND  Extremities: No peripheral edema  Neurological: A/O x 3, no focal deficits  Psychiatric: Normal mood, normal affect  : No Junior  Skin: No rashes  Access: Not applicable  Back: No CVA tenderness    LABS:                RADIOLOGY & ADDITIONAL STUDIES:

## 2022-11-15 NOTE — PROGRESS NOTE ADULT - SUBJECTIVE AND OBJECTIVE BOX
PATIENT SEEN AND EXAMINED ON :- 11/15/22  DATE OF SERVICE:    11/15/22         Interim events noted,Labs ,Radiological studies and Cardiology tests reviewed .    DATE OF SERVICE: 11-15-22 @ 20:47  Patient was seen and examined on    11-15-22 @ 20:47 .Interim events noted.Consultant notes ,Labs,Telemetry reviewed by me       HOSPITAL COURSE: HPI:  73 y/o F with pmhx of COPD on home O2(3L) with severe pHTN, MVR/TVR , AF s/p ablation,  OHS/MIGUEL on home biPAP, HTN, HLD who presents with one week of vomiting with poor oral intake also complaining of SOB and chest pain.  She also states she has been taking prednisone on/off for the past 3 months for chest tightness and does endorse some weight gain.     In the ED pt found to have A Flutter and given Amio 400 po and Dig loaded.  (23 Oct 2022 04:23)      INTERIM EVENTS:Patient seen at bedside ,interim events noted.      PMH -reviewed admission note, no change since admission  HEART FAILURE: Acute[ ]Chronic[ ] Systolic[ ] Diastolic[ ] Combined Systolic and Diastolic[ ]  CAD[ ] CABG[ ] PCI[ ]  DEVICES[ ] PPM[ ] ICD[ ] ILR[ ]  ATRIAL FIBRILLATION[ ] Paroxysmal[ ] Permanent[ ] CHADS2-[  ]  MISAEL[ ] CKD1[ ] CKD2[ ] CKD3[ ] CKD4[ ] ESRD[ ]  COPD[ ] HTN[ ]   DM[ ] Type1[ ] Type 2[ ]   CVA[ ] Paresis[ ]    AMBULATION: Assisted[ ] Cane/walker[ ] Independent[ ]    MEDICATIONS  (STANDING):  aMIOdarone    Tablet 200 milliGRAM(s) Oral daily  buMETAnide 2 milliGRAM(s) Oral daily  insulin glargine Injectable (LANTUS) 12 Unit(s) SubCutaneous at bedtime  insulin lispro (ADMELOG) corrective regimen sliding scale   SubCutaneous three times a day before meals  insulin lispro (ADMELOG) corrective regimen sliding scale   SubCutaneous at bedtime  isosorbide   mononitrate ER Tablet (IMDUR) 30 milliGRAM(s) Oral daily  lidocaine   4% Patch 1 Patch Transdermal daily  pantoprazole    Tablet 40 milliGRAM(s) Oral before breakfast  polyethylene glycol 3350 17 Gram(s) Oral daily  predniSONE   Tablet 5 milliGRAM(s) Oral daily  senna 2 Tablet(s) Oral at bedtime  simvastatin 10 milliGRAM(s) Oral at bedtime  warfarin 3 milliGRAM(s) Oral once    MEDICATIONS  (PRN):  acetaminophen     Tablet .. 650 milliGRAM(s) Oral every 6 hours PRN Temp greater or equal to 38C (100.4F), Mild Pain (1 - 3)  ALBUTerol    90 MICROgram(s) HFA Inhaler 2 Puff(s) Inhalation every 6 hours PRN Bronchospasm  melatonin 3 milliGRAM(s) Oral at bedtime PRN Insomnia      REVIEW OF SYSTEMS:  Constitutional: [ ] fever, [ ]weight loss,  [ ]fatigue [ ]weight gain  Eyes: [ ] visual changes  Respiratory: [ ]shortness of breath;  [ ] cough, [ ]wheezing, [ ]chills, [ ]hemoptysis  Cardiovascular: [ ] chest pain, [ ]palpitations, [ ]dizziness,  [ ]leg swelling[ ]orthopnea[ ]PND  Gastrointestinal: [ ] abdominal pain, [ ]nausea, [ ]vomiting,  [ ]diarrhea [ ]Constipation [ ]Melena  Genitourinary: [ ] dysuria, [ ] hematuria [ ]Junior  Neurologic: [ ] headaches [ ] tremors[ ]weakness [ ]Paralysis Right[ ] Left[ ]  Skin: [ ] itching, [ ]burning, [ ] rashes  Endocrine: [ ] heat or cold intolerance  Musculoskeletal: [ ] joint pain or swelling; [ ] muscle, back, or extremity pain  Psychiatric: [ ] depression, [ ]anxiety, [ ]mood swings, or [ ]difficulty sleeping  Hematologic: [ ] easy bruising, [ ] bleeding gums    [ ] All remaining systems negative except as per above.   [ ]Unable to obtain.  [x] No change in ROS since admission      Vital Signs Last 24 Hrs  T(C): 36.5 (15 Nov 2022 12:01), Max: 36.7 (15 Nov 2022 05:32)  T(F): 97.7 (15 Nov 2022 12:01), Max: 98 (15 Nov 2022 05:32)  HR: 65 (15 Nov 2022 12:01) (60 - 65)  BP: 162/72 (15 Nov 2022 12:01) (148/74 - 162/72)  BP(mean): --  RR: 18 (15 Nov 2022 12:01) (18 - 18)  SpO2: 99% (15 Nov 2022 12:01) (96% - 99%)    Parameters below as of 15 Nov 2022 12:01  Patient On (Oxygen Delivery Method): room air      I&O's Summary    14 Nov 2022 07:01  -  15 Nov 2022 07:00  --------------------------------------------------------  IN: 0 mL / OUT: 1500 mL / NET: -1500 mL    15 Nov 2022 07:01  -  15 Nov 2022 20:47  --------------------------------------------------------  IN: 840 mL / OUT: 1000 mL / NET: -160 mL        PHYSICAL EXAM:  General: No acute distress BMI-  HEENT: EOMI, PERRL  Neck: Supple, [ ] JVD  Lungs: Equal air entry bilaterally; [ ] rales [ ] wheezing [ ] rhonchi  Heart: Regular rate and rhythm; [x ] murmur   2/6 [ x] systolic [ ] diastolic [ ] radiation[ ] rubs [ ]  gallops  Abdomen: Nontender, bowel sounds present  Extremities: No clubbing, cyanosis, [ ] edema [ ]Pulses  equal and intact  Nervous system:  Alert & Oriented X3, no focal deficits  Psychiatric: Normal affect  Skin: No rashes or lesions    LABS:        Creatinine Trend: 2.05<--, 2.47<--, 2.83<--, 3.24<--, 3.34<--, 2.85<--    PT/INR - ( 15 Nov 2022 07:39 )   PT: 31.7 sec;   INR: 2.73 ratio

## 2022-11-16 ENCOUNTER — TRANSCRIPTION ENCOUNTER (OUTPATIENT)
Age: 72
End: 2022-11-16

## 2022-11-16 VITALS
HEART RATE: 67 BPM | SYSTOLIC BLOOD PRESSURE: 161 MMHG | DIASTOLIC BLOOD PRESSURE: 77 MMHG | TEMPERATURE: 97 F | RESPIRATION RATE: 18 BRPM | OXYGEN SATURATION: 100 %

## 2022-11-16 LAB
APTT BLD: 45.7 SEC — HIGH (ref 27.5–35.5)
GLUCOSE BLDC GLUCOMTR-MCNC: 109 MG/DL — HIGH (ref 70–99)
INR BLD: 2.04 RATIO — HIGH (ref 0.88–1.16)
PROTHROM AB SERPL-ACNC: 23.6 SEC — HIGH (ref 10.5–13.4)

## 2022-11-16 PROCEDURE — 93320 DOPPLER ECHO COMPLETE: CPT

## 2022-11-16 PROCEDURE — 97530 THERAPEUTIC ACTIVITIES: CPT

## 2022-11-16 PROCEDURE — 94640 AIRWAY INHALATION TREATMENT: CPT

## 2022-11-16 PROCEDURE — 84295 ASSAY OF SERUM SODIUM: CPT

## 2022-11-16 PROCEDURE — 71045 X-RAY EXAM CHEST 1 VIEW: CPT

## 2022-11-16 PROCEDURE — 85730 THROMBOPLASTIN TIME PARTIAL: CPT

## 2022-11-16 PROCEDURE — 85520 HEPARIN ASSAY: CPT

## 2022-11-16 PROCEDURE — 92960 CARDIOVERSION ELECTRIC EXT: CPT

## 2022-11-16 PROCEDURE — 93005 ELECTROCARDIOGRAM TRACING: CPT

## 2022-11-16 PROCEDURE — 82803 BLOOD GASES ANY COMBINATION: CPT

## 2022-11-16 PROCEDURE — 82570 ASSAY OF URINE CREATININE: CPT

## 2022-11-16 PROCEDURE — 86900 BLOOD TYPING SEROLOGIC ABO: CPT

## 2022-11-16 PROCEDURE — 80048 BASIC METABOLIC PNL TOTAL CA: CPT

## 2022-11-16 PROCEDURE — U0005: CPT

## 2022-11-16 PROCEDURE — 83036 HEMOGLOBIN GLYCOSYLATED A1C: CPT

## 2022-11-16 PROCEDURE — 83880 ASSAY OF NATRIURETIC PEPTIDE: CPT

## 2022-11-16 PROCEDURE — 83540 ASSAY OF IRON: CPT

## 2022-11-16 PROCEDURE — 93325 DOPPLER ECHO COLOR FLOW MAPG: CPT

## 2022-11-16 PROCEDURE — 86901 BLOOD TYPING SEROLOGIC RH(D): CPT

## 2022-11-16 PROCEDURE — 84300 ASSAY OF URINE SODIUM: CPT

## 2022-11-16 PROCEDURE — 96375 TX/PRO/DX INJ NEW DRUG ADDON: CPT

## 2022-11-16 PROCEDURE — 82947 ASSAY GLUCOSE BLOOD QUANT: CPT

## 2022-11-16 PROCEDURE — U0003: CPT

## 2022-11-16 PROCEDURE — 85018 HEMOGLOBIN: CPT

## 2022-11-16 PROCEDURE — 84484 ASSAY OF TROPONIN QUANT: CPT

## 2022-11-16 PROCEDURE — 82435 ASSAY OF BLOOD CHLORIDE: CPT

## 2022-11-16 PROCEDURE — 82330 ASSAY OF CALCIUM: CPT

## 2022-11-16 PROCEDURE — 80061 LIPID PANEL: CPT

## 2022-11-16 PROCEDURE — 80053 COMPREHEN METABOLIC PANEL: CPT

## 2022-11-16 PROCEDURE — 93312 ECHO TRANSESOPHAGEAL: CPT

## 2022-11-16 PROCEDURE — 82728 ASSAY OF FERRITIN: CPT

## 2022-11-16 PROCEDURE — 84132 ASSAY OF SERUM POTASSIUM: CPT

## 2022-11-16 PROCEDURE — 82310 ASSAY OF CALCIUM: CPT

## 2022-11-16 PROCEDURE — 36415 COLL VENOUS BLD VENIPUNCTURE: CPT

## 2022-11-16 PROCEDURE — 85027 COMPLETE CBC AUTOMATED: CPT

## 2022-11-16 PROCEDURE — 85025 COMPLETE CBC W/AUTO DIFF WBC: CPT

## 2022-11-16 PROCEDURE — 96374 THER/PROPH/DIAG INJ IV PUSH: CPT

## 2022-11-16 PROCEDURE — 85610 PROTHROMBIN TIME: CPT

## 2022-11-16 PROCEDURE — 83615 LACTATE (LD) (LDH) ENZYME: CPT

## 2022-11-16 PROCEDURE — 83550 IRON BINDING TEST: CPT

## 2022-11-16 PROCEDURE — 85014 HEMATOCRIT: CPT

## 2022-11-16 PROCEDURE — 83605 ASSAY OF LACTIC ACID: CPT

## 2022-11-16 PROCEDURE — 99285 EMERGENCY DEPT VISIT HI MDM: CPT

## 2022-11-16 PROCEDURE — 84443 ASSAY THYROID STIM HORMONE: CPT

## 2022-11-16 PROCEDURE — 82962 GLUCOSE BLOOD TEST: CPT

## 2022-11-16 PROCEDURE — 81001 URINALYSIS AUTO W/SCOPE: CPT

## 2022-11-16 PROCEDURE — 84540 ASSAY OF URINE/UREA-N: CPT

## 2022-11-16 PROCEDURE — 97162 PT EVAL MOD COMPLEX 30 MIN: CPT

## 2022-11-16 PROCEDURE — 83735 ASSAY OF MAGNESIUM: CPT

## 2022-11-16 PROCEDURE — C8929: CPT

## 2022-11-16 PROCEDURE — 97116 GAIT TRAINING THERAPY: CPT

## 2022-11-16 PROCEDURE — 94660 CPAP INITIATION&MGMT: CPT

## 2022-11-16 PROCEDURE — 83970 ASSAY OF PARATHORMONE: CPT

## 2022-11-16 PROCEDURE — 86850 RBC ANTIBODY SCREEN: CPT

## 2022-11-16 PROCEDURE — 84100 ASSAY OF PHOSPHORUS: CPT

## 2022-11-16 PROCEDURE — 92610 EVALUATE SWALLOWING FUNCTION: CPT

## 2022-11-16 RX ORDER — WARFARIN SODIUM 2.5 MG/1
1 TABLET ORAL
Qty: 30 | Refills: 0
Start: 2022-11-16 | End: 2022-12-15

## 2022-11-16 RX ORDER — ISOSORBIDE MONONITRATE 60 MG/1
1 TABLET, EXTENDED RELEASE ORAL
Qty: 30 | Refills: 0
Start: 2022-11-16 | End: 2022-12-15

## 2022-11-16 RX ORDER — BUMETANIDE 0.25 MG/ML
2 INJECTION INTRAMUSCULAR; INTRAVENOUS
Qty: 60 | Refills: 0
Start: 2022-11-16 | End: 2022-12-15

## 2022-11-16 RX ORDER — BUMETANIDE 0.25 MG/ML
2 INJECTION INTRAMUSCULAR; INTRAVENOUS
Qty: 0 | Refills: 0 | DISCHARGE
Start: 2022-11-16 | End: 2022-12-15

## 2022-11-16 RX ORDER — AMIODARONE HYDROCHLORIDE 400 MG/1
1 TABLET ORAL
Qty: 30 | Refills: 0
Start: 2022-11-16 | End: 2022-12-15

## 2022-11-16 RX ADMIN — LIDOCAINE 1 PATCH: 4 CREAM TOPICAL at 02:00

## 2022-11-16 RX ADMIN — BUMETANIDE 2 MILLIGRAM(S): 0.25 INJECTION INTRAMUSCULAR; INTRAVENOUS at 06:17

## 2022-11-16 RX ADMIN — AMIODARONE HYDROCHLORIDE 200 MILLIGRAM(S): 400 TABLET ORAL at 06:17

## 2022-11-16 RX ADMIN — Medication 5 MILLIGRAM(S): at 06:18

## 2022-11-16 RX ADMIN — PANTOPRAZOLE SODIUM 40 MILLIGRAM(S): 20 TABLET, DELAYED RELEASE ORAL at 06:18

## 2022-11-16 NOTE — DISCHARGE NOTE NURSING/CASE MANAGEMENT/SOCIAL WORK - PATIENT PORTAL LINK FT
You can access the FollowMyHealth Patient Portal offered by [facility] by registering at the following website: [email or web address]. By joining [employer]’s FollowMyHealth portal, you will also be able to view your health information using other applications (apps) compatible with our system.

## 2022-11-16 NOTE — PROGRESS NOTE ADULT - SUBJECTIVE AND OBJECTIVE BOX
Patient is a 72y Female     Patient is a 72y old  Female who presents with a chief complaint of Vomiting (15 Nov 2022 20:47)      HPI:  71 y/o F with pmhx of COPD on home O2(3L) with severe pHTN, MVR/TVR , AF s/p ablation,  OHS/MIGUEL on home biPAP, HTN, HLD who presents with one week of vomiting with poor oral intake also complaining of SOB and chest pain.  She also states she has been taking prednisone on/off for the past 3 months for chest tightness and does endorse some weight gain.     In the ED pt found to have A Flutter and given Amio 400 po and Dig loaded.  (23 Oct 2022 04:23)      PAST MEDICAL & SURGICAL HISTORY:  Atrial fibrillation  S/P Ablation      Pulmonary hypertension      MIGUEL (obstructive sleep apnea)      COPD (chronic obstructive pulmonary disease)  on home O2      CHF (congestive heart failure)      HTN (hypertension)      Gout      Mitral valve replaced      Tricuspid valve replaced      CHF (congestive heart failure)      Hypertension      COPD (chronic obstructive pulmonary disease)      History of mitral valve replacement with mechanical valve      Tricuspid valve replaced  mechanical          MEDICATIONS  (STANDING):  aMIOdarone    Tablet 200 milliGRAM(s) Oral daily  buMETAnide 2 milliGRAM(s) Oral daily  insulin glargine Injectable (LANTUS) 12 Unit(s) SubCutaneous at bedtime  insulin lispro (ADMELOG) corrective regimen sliding scale   SubCutaneous at bedtime  insulin lispro (ADMELOG) corrective regimen sliding scale   SubCutaneous three times a day before meals  isosorbide   mononitrate ER Tablet (IMDUR) 30 milliGRAM(s) Oral daily  lidocaine   4% Patch 1 Patch Transdermal daily  pantoprazole    Tablet 40 milliGRAM(s) Oral before breakfast  polyethylene glycol 3350 17 Gram(s) Oral daily  predniSONE   Tablet 5 milliGRAM(s) Oral daily  senna 2 Tablet(s) Oral at bedtime  simvastatin 10 milliGRAM(s) Oral at bedtime      Allergies    No Known Allergies    Intolerances        SOCIAL HISTORY:  Denies ETOh,Smoking,     FAMILY HISTORY:  Family history of hypertension (Father, Sibling)    Family history of stroke    Family history of hypertension (Mother)        REVIEW OF SYSTEMS:    CONSTITUTIONAL: No weakness, fevers or chills  EYES/ENT: No visual changes;  No vertigo or throat pain   NECK: No pain or stiffness  RESPIRATORY: No cough, wheezing, hemoptysis; No shortness of breath  CARDIOVASCULAR: No chest pain or palpitations  GASTROINTESTINAL: No abdominal or epigastric pain. No nausea, vomiting, or hematemesis; No diarrhea or constipation. No melena or hematochezia.  GENITOURINARY: No dysuria, frequency or hematuria  NEUROLOGICAL: No numbness or weakness  SKIN: No itching, burning, rashes, or lesions   All other review of systems is negative unless indicated above.    VITAL:  T(C): , Max: 36.6 (11-15-22 @ 08:59)  T(F): , Max: 97.9 (11-15-22 @ 08:59)  HR: 63 (11-16-22 @ 05:50)  BP: 134/68 (11-16-22 @ 04:17)  BP(mean): --  RR: 18 (11-16-22 @ 04:17)  SpO2: 96% (11-16-22 @ 05:50)  Wt(kg): --    I and O's:    11-14 @ 07:01  -  11-15 @ 07:00  --------------------------------------------------------  IN: 0 mL / OUT: 1500 mL / NET: -1500 mL    11-15 @ 07:01  -  11-16 @ 07:00  --------------------------------------------------------  IN: 1120 mL / OUT: 1900 mL / NET: -780 mL          PHYSICAL EXAM:    Constitutional: NAD  HEENT: PERRLA,   Neck: No JVD  Respiratory: CTA B/L  Cardiovascular: S1 and S2  Gastrointestinal: BS+, soft, NT/ND  Extremities: No peripheral edema  Neurological: A/O x 3, no focal deficits  Psychiatric: Normal mood, normal affect  : No Junior  Skin: No rashes  Access: Not applicable  Back: No CVA tenderness    LABS:                RADIOLOGY & ADDITIONAL STUDIES:

## 2022-11-16 NOTE — CHART NOTE - NSCHARTNOTESELECT_GEN_ALL_CORE
EP Chart note
Junctional Rhythm/Event Note
Nutrition Services
CICU DAY NOTE/Event Note
Discharge/Event Note
Transfer Note

## 2022-11-16 NOTE — DISCHARGE NOTE NURSING/CASE MANAGEMENT/SOCIAL WORK - NSSCTYPOFSERV_GEN_ALL_CORE
Home care service. assessment . evaluation  treatment and teaching  Home physical therapy.  accepting agency will contact you 1-2 days after discharge,.

## 2022-11-16 NOTE — PROGRESS NOTE ADULT - REASON FOR ADMISSION
Vomiting

## 2022-11-16 NOTE — PROGRESS NOTE ADULT - NUTRITIONAL ASSESSMENT
This patient has been assessed with a concern for Malnutrition and has been determined to have a diagnosis/diagnoses of Morbid obesity (BMI > 40).    This patient is being managed with:   Diet Consistent Carbohydrate w/Evening Snack-  DASH/TLC {Sodium & Cholesterol Restricted} (DASH)  No Concentrated Potassium  Supplement Feeding Modality:  Oral  Suplena Cans or Servings Per Day:  1       Frequency:  Daily  Entered: Nov 5 2022  8:35AM    
This patient has been assessed with a concern for Malnutrition and has been determined to have a diagnosis/diagnoses of Morbid obesity (BMI > 40).    This patient is being managed with:   Diet NPO after Midnight-     NPO Start Date: 06-Nov-2022   NPO Start Time: 23:59  Entered: Nov 6 2022  9:56AM    Diet NPO-  NPO for Procedure/Test     NPO Start Date: 06-Nov-2022   NPO Start Time: 23:59  Entered: Nov 5 2022 10:12AM    Diet Consistent Carbohydrate w/Evening Snack-  DASH/TLC {Sodium & Cholesterol Restricted} (DASH)  No Concentrated Potassium  Supplement Feeding Modality:  Oral  Suplena Cans or Servings Per Day:  1       Frequency:  Daily  Entered: Nov 5 2022  8:35AM    
This patient has been assessed with a concern for Malnutrition and has been determined to have a diagnosis/diagnoses of Morbid obesity (BMI > 40).    This patient is being managed with:   Diet NPO-  NPO for Procedure/Test     NPO Start Date: 06-Nov-2022   NPO Start Time: 23:59  Entered: Nov 5 2022 10:12AM    Diet Consistent Carbohydrate w/Evening Snack-  DASH/TLC {Sodium & Cholesterol Restricted} (DASH)  No Concentrated Potassium  Supplement Feeding Modality:  Oral  Suplena Cans or Servings Per Day:  1       Frequency:  Daily  Entered: Nov 5 2022  8:35AM    
This patient has been assessed with a concern for Malnutrition and has been determined to have a diagnosis/diagnoses of Morbid obesity (BMI > 40).      
This patient has been assessed with a concern for Malnutrition and has been determined to have a diagnosis/diagnoses of Morbid obesity (BMI > 40).    This patient is being managed with:   Diet Consistent Carbohydrate w/Evening Snack-  DASH/TLC {Sodium & Cholesterol Restricted} (DASH)  No Concentrated Potassium  Supplement Feeding Modality:  Oral  Suplena Cans or Servings Per Day:  1       Frequency:  Daily  Entered: Nov 5 2022  8:35AM    
This patient has been assessed with a concern for Malnutrition and has been determined to have a diagnosis/diagnoses of Morbid obesity (BMI > 40).    This patient is being managed with:   Diet Consistent Carbohydrate w/Evening Snack-  DASH/TLC {Sodium & Cholesterol Restricted} (DASH)  No Concentrated Potassium  Supplement Feeding Modality:  Oral  Suplena Cans or Servings Per Day:  1       Frequency:  Daily  Entered: Nov 5 2022  8:35AM    
This patient has been assessed with a concern for Malnutrition and has been determined to have a diagnosis/diagnoses of Morbid obesity (BMI > 40).    This patient is being managed with:   Diet Consistent Carbohydrate w/Evening Snack-  DASH/TLC {Sodium & Cholesterol Restricted} (DASH)  Supplement Feeding Modality:  Oral  Suplena Cans or Servings Per Day:  1       Frequency:  Daily  Entered: Oct 31 2022  1:45PM    
This patient has been assessed with a concern for Malnutrition and has been determined to have a diagnosis/diagnoses of Morbid obesity (BMI > 40).    This patient is being managed with:   Diet NPO after Midnight-     NPO Start Date: 02-Nov-2022   NPO Start Time: 23:59  Except Medications  Entered: Nov 2 2022  6:09PM    Diet Consistent Carbohydrate w/Evening Snack-  DASH/TLC {Sodium & Cholesterol Restricted} (DASH)  Supplement Feeding Modality:  Oral  Suplena Cans or Servings Per Day:  1       Frequency:  Daily  Entered: Oct 31 2022  1:45PM    
This patient has been assessed with a concern for Malnutrition and has been determined to have a diagnosis/diagnoses of Morbid obesity (BMI > 40).    This patient is being managed with:   Diet NPO after Midnight-     NPO Start Date: 03-Nov-2022   NPO Start Time: 23:59  Entered: Nov  3 2022 10:45AM    Diet Consistent Carbohydrate w/Evening Snack-  DASH/TLC {Sodium & Cholesterol Restricted} (DASH)  No Concentrated Potassium  Supplement Feeding Modality:  Oral  Suplena Cans or Servings Per Day:  1       Frequency:  Daily  Entered: Nov  3 2022 10:42AM    
This patient has been assessed with a concern for Malnutrition and has been determined to have a diagnosis/diagnoses of Morbid obesity (BMI > 40).    This patient is being managed with:   Diet NPO-  NPO for Procedure/Test     NPO Start Date: 06-Nov-2022   NPO Start Time: 23:59  Entered: Nov 5 2022 10:12AM    Diet Consistent Carbohydrate w/Evening Snack-  DASH/TLC {Sodium & Cholesterol Restricted} (DASH)  No Concentrated Potassium  Supplement Feeding Modality:  Oral  Suplena Cans or Servings Per Day:  1       Frequency:  Daily  Entered: Nov 5 2022  8:35AM    
This patient has been assessed with a concern for Malnutrition and has been determined to have a diagnosis/diagnoses of Morbid obesity (BMI > 40).    This patient is being managed with:   Diet Consistent Carbohydrate w/Evening Snack-  DASH/TLC {Sodium & Cholesterol Restricted} (DASH)  No Concentrated Potassium  Supplement Feeding Modality:  Oral  Suplena Cans or Servings Per Day:  1       Frequency:  Daily  Entered: Nov 5 2022  8:35AM    
This patient has been assessed with a concern for Malnutrition and has been determined to have a diagnosis/diagnoses of Morbid obesity (BMI > 40).    This patient is being managed with:   Diet Consistent Carbohydrate w/Evening Snack-  DASH/TLC {Sodium & Cholesterol Restricted} (DASH)  Supplement Feeding Modality:  Oral  Suplena Cans or Servings Per Day:  1       Frequency:  Daily  Entered: Oct 31 2022  1:45PM    
This patient has been assessed with a concern for Malnutrition and has been determined to have a diagnosis/diagnoses of Morbid obesity (BMI > 40).    This patient is being managed with:   Diet NPO after Midnight-     NPO Start Date: 03-Nov-2022   NPO Start Time: 23:59  Entered: Nov  3 2022 10:45AM    Diet Consistent Carbohydrate w/Evening Snack-  DASH/TLC {Sodium & Cholesterol Restricted} (DASH)  No Concentrated Potassium  Supplement Feeding Modality:  Oral  Suplena Cans or Servings Per Day:  1       Frequency:  Daily  Entered: Nov  3 2022 10:42AM    
This patient has been assessed with a concern for Malnutrition and has been determined to have a diagnosis/diagnoses of Morbid obesity (BMI > 40).    This patient is being managed with:   Diet Consistent Carbohydrate w/Evening Snack-  DASH/TLC {Sodium & Cholesterol Restricted} (DASH)  No Concentrated Potassium  Supplement Feeding Modality:  Oral  Suplena Cans or Servings Per Day:  1       Frequency:  Daily  Entered: Nov 5 2022  8:35AM    
This patient has been assessed with a concern for Malnutrition and has been determined to have a diagnosis/diagnoses of Morbid obesity (BMI > 40).    This patient is being managed with:   Diet NPO after Midnight-     NPO Start Date: 03-Nov-2022   NPO Start Time: 23:59  Entered: Nov  3 2022 10:45AM    Diet Consistent Carbohydrate w/Evening Snack-  DASH/TLC {Sodium & Cholesterol Restricted} (DASH)  No Concentrated Potassium  Supplement Feeding Modality:  Oral  Suplena Cans or Servings Per Day:  1       Frequency:  Daily  Entered: Nov  3 2022 10:42AM    
This patient has been assessed with a concern for Malnutrition and has been determined to have a diagnosis/diagnoses of Morbid obesity (BMI > 40).    This patient is being managed with:   Diet Consistent Carbohydrate w/Evening Snack-  DASH/TLC {Sodium & Cholesterol Restricted} (DASH)  No Concentrated Potassium  Supplement Feeding Modality:  Oral  Suplena Cans or Servings Per Day:  1       Frequency:  Daily  Entered: Nov 5 2022  8:35AM    
This patient has been assessed with a concern for Malnutrition and has been determined to have a diagnosis/diagnoses of Morbid obesity (BMI > 40).    This patient is being managed with:   Diet NPO-  NPO for Procedure/Test     NPO Start Date: 06-Nov-2022   NPO Start Time: 23:59  Entered: Nov 5 2022 10:12AM    Diet Consistent Carbohydrate w/Evening Snack-  DASH/TLC {Sodium & Cholesterol Restricted} (DASH)  No Concentrated Potassium  Supplement Feeding Modality:  Oral  Suplena Cans or Servings Per Day:  1       Frequency:  Daily  Entered: Nov 5 2022  8:35AM

## 2022-11-16 NOTE — DISCHARGE NOTE NURSING/CASE MANAGEMENT/SOCIAL WORK - NSDCPEFALRISK_GEN_ALL_CORE
For information on Fall & Injury Prevention, visit: [email or web address] OR  [email or web address] OR  https://www.cdc.gov/steadi/patient.html

## 2022-11-16 NOTE — PROGRESS NOTE ADULT - ASSESSMENT
72 year old Female with history of COPD, CHF presents with SOB. Nephrology consulted for elevated Scr.    1) MISAEL: likely hemodynamically mediated in setting of aflutter and HF with recurrent MISAEL post-cardioversion with bradycardia. Scr improving. Continue with supportive care. UA bland with hyaline casts. FeNa elevated however drawn on diuretics (not accurate). CT without hydronephrosis. Avoid nephrotoxins.    2) CKD-3b: Baseline Scr 1.4-1.6 with CKD likely due to DM. Outpatient CKD work up. Monitor electrolytes.    3) HTN with CKD: BP controlled. Bradycardia as per cardiology/EP. Monitor BP.    4) LE edema: Improving with good UO (1.9L). Continue with bumex 2 mg PO daily and will an additional bumex 2 mg QHS prn weight gain. TTE with mild to moderate LVSD. Monitor UO.    5) Hypercalcemia: Secondary to primary HPT. Defer sensipar given resolution of hypercalcemia. Monitor serum calcium.    6) Anemia of renal disease: Hb low with iron deficiency s/p venofer s/p Epo 10K X 1 dose on 11/14. Monitor Hb.    7) L renal mass: Not visualized on CT. Outpatient Urology evaluation.      [facility] NEPHROLOGY  Rodrigue Amador M.D.  Rob Perez D.O.  Ana Song M.D.  Lucrecia López, ALLISON, ANP-C    Telephone: [phone]  Facsimile: [phone]    [address] [place] [address]

## 2022-11-16 NOTE — CHART NOTE - NSCHARTNOTEFT_GEN_A_CORE
Pt noted to have INR 2.03. pt with mechanical MVR. Discussed with cardiology. Pt to get coumadin 5 mg today and 3 mg thereafter. Pt to check INR levels on 11/18. Discussed with Pt's daughter. Pt discharged home with home PT.

## 2022-11-16 NOTE — DISCHARGE NOTE NURSING/CASE MANAGEMENT/SOCIAL WORK - NSDCFUADDAPPT_GEN_ALL_CORE_FT
APPTS ARE READY TO BE MADE: [x ] YES    Additional Information about above appointments (if needed):    1: PMD  2:   3:     Other comments or requests:

## 2022-11-16 NOTE — PROGRESS NOTE ADULT - SUBJECTIVE AND OBJECTIVE BOX
[facility] NEPHROLOGY- PROGRESS NOTE    72 year old Female with history of COPD, CHF presents with SOB. Nephrology consulted for elevated Scr.    REVIEW OF SYSTEMS:  Gen: no fevers  Cards: no chest pain  Resp: + dyspnea with exertion improving  GI: no nausea or vomiting or diarrhea  Vascular: + LE edema improving    No Known Allergies      Hospital Medications: Medications reviewed      VITALS:  T(F): 97.8 (11-16-22 @ 04:17), Max: 97.9 (11-15-22 @ 20:38)  HR: 63 (11-16-22 @ 05:50)  BP: 134/68 (11-16-22 @ 04:17)  RR: 18 (11-16-22 @ 04:17)  SpO2: 96% (11-16-22 @ 05:50)  Wt(kg): --    11-15 @ 07:01  -  11-16 @ 07:00  --------------------------------------------------------  IN: 1120 mL / OUT: 1900 mL / NET: -780 mL        PHYSICAL EXAM:    Gen: NAD, calm  Cards: Irregularly irregular, +S1/S2, no M/G/R  Resp: +decreased left base  GI: soft, NT/ND, NABS  Vascular: trace LE edema B/L      LABS: N/A

## 2022-11-17 RX ORDER — WARFARIN SODIUM 2.5 MG/1
1 TABLET ORAL
Qty: 30 | Refills: 0
Start: 2022-11-17 | End: 2022-12-16

## 2022-11-29 ENCOUNTER — APPOINTMENT (OUTPATIENT)
Dept: CARE COORDINATION | Facility: HOME HEALTH | Age: 72
End: 2022-11-29

## 2022-11-29 DIAGNOSIS — Z86.2 PERSONAL HISTORY OF DISEASES OF THE BLOOD AND BLOOD-FORMING ORGANS AND CERTAIN DISORDERS INVOLVING THE IMMUNE MECHANISM: ICD-10-CM

## 2022-11-29 DIAGNOSIS — N17.9 ACUTE KIDNEY FAILURE, UNSPECIFIED: ICD-10-CM

## 2022-11-29 DIAGNOSIS — Z86.39 PERSONAL HISTORY OF OTHER ENDOCRINE, NUTRITIONAL AND METABOLIC DISEASE: ICD-10-CM

## 2022-11-29 DIAGNOSIS — Z98.890 OTHER SPECIFIED POSTPROCEDURAL STATES: ICD-10-CM

## 2022-11-29 DIAGNOSIS — Z87.898 PERSONAL HISTORY OF OTHER SPECIFIED CONDITIONS: ICD-10-CM

## 2022-11-29 DIAGNOSIS — Z86.79 PERSONAL HISTORY OF OTHER DISEASES OF THE CIRCULATORY SYSTEM: ICD-10-CM

## 2022-11-29 DIAGNOSIS — Z78.9 OTHER SPECIFIED HEALTH STATUS: ICD-10-CM

## 2022-11-29 DIAGNOSIS — Z82.49 FAMILY HISTORY OF ISCHEMIC HEART DISEASE AND OTHER DISEASES OF THE CIRCULATORY SYSTEM: ICD-10-CM

## 2022-11-29 DIAGNOSIS — Z87.09 PERSONAL HISTORY OF OTHER DISEASES OF THE RESPIRATORY SYSTEM: ICD-10-CM

## 2022-11-29 DIAGNOSIS — I48.92 UNSPECIFIED ATRIAL FLUTTER: ICD-10-CM

## 2022-11-29 DIAGNOSIS — Z82.3 FAMILY HISTORY OF STROKE: ICD-10-CM

## 2022-11-29 PROCEDURE — 99348 HOME/RES VST EST LOW MDM 30: CPT | Mod: 95

## 2022-12-01 PROBLEM — Z86.2 HISTORY OF ANEMIA: Status: RESOLVED | Noted: 2022-12-01 | Resolved: 2022-12-01

## 2022-12-01 PROBLEM — Z78.9 CURRENT NON-SMOKER: Status: ACTIVE | Noted: 2022-12-01

## 2022-12-01 PROBLEM — Z82.49 FAMILY HISTORY OF HYPERTENSION: Status: ACTIVE | Noted: 2022-12-01

## 2022-12-01 PROBLEM — Z86.79 HISTORY OF HYPERTENSION: Status: RESOLVED | Noted: 2022-12-01 | Resolved: 2022-12-01

## 2022-12-01 PROBLEM — Z86.79 HISTORY OF ATRIAL FLUTTER: Status: RESOLVED | Noted: 2022-12-01 | Resolved: 2022-12-01

## 2022-12-01 PROBLEM — Z82.3 FAMILY HISTORY OF CEREBROVASCULAR ACCIDENT (CVA): Status: ACTIVE | Noted: 2022-12-01

## 2022-12-01 PROBLEM — Z78.9 DENIES ALCOHOL CONSUMPTION: Status: ACTIVE | Noted: 2022-12-01

## 2022-12-01 PROBLEM — Z78.9 DOES NOT USE ILLICIT DRUGS: Status: ACTIVE | Noted: 2022-12-01

## 2022-12-01 PROBLEM — Z87.898 HISTORY OF BRADYCARDIA: Status: RESOLVED | Noted: 2022-12-01 | Resolved: 2022-12-01

## 2022-12-01 PROBLEM — Z87.898 HISTORY OF EDEMA: Status: RESOLVED | Noted: 2022-12-01 | Resolved: 2022-12-01

## 2022-12-01 PROBLEM — N17.9 AKI (ACUTE KIDNEY INJURY): Status: RESOLVED | Noted: 2022-12-01 | Resolved: 2022-12-01

## 2022-12-01 PROBLEM — Z86.79 HISTORY OF MITRAL VALVE DISEASE: Status: RESOLVED | Noted: 2022-12-01 | Resolved: 2022-12-01

## 2022-12-01 PROBLEM — Z98.890 HISTORY OF CARDIOVERSION: Status: RESOLVED | Noted: 2022-12-01 | Resolved: 2022-12-01

## 2022-12-01 PROBLEM — Z86.39 HISTORY OF HYPERCALCEMIA: Status: RESOLVED | Noted: 2022-12-01 | Resolved: 2022-12-01

## 2022-12-01 PROBLEM — Z86.79 HISTORY OF TRICUSPID VALVE DISEASE: Status: RESOLVED | Noted: 2022-12-01 | Resolved: 2022-12-01

## 2022-12-01 RX ORDER — WARFARIN 5 MG/1
5 TABLET ORAL
Qty: 30 | Refills: 0 | Status: DISCONTINUED | COMMUNITY
Start: 2022-11-16

## 2022-12-01 RX ORDER — BUMETANIDE 1 MG/1
1 TABLET ORAL
Qty: 180 | Refills: 0 | Status: DISCONTINUED | COMMUNITY
Start: 2021-09-06

## 2022-12-01 RX ORDER — AMIODARONE HYDROCHLORIDE 200 MG/1
200 TABLET ORAL
Qty: 30 | Refills: 0 | Status: DISCONTINUED | COMMUNITY
Start: 2022-11-16

## 2022-12-01 RX ORDER — WARFARIN 3 MG/1
3 TABLET ORAL
Qty: 30 | Refills: 0 | Status: DISCONTINUED | COMMUNITY
Start: 2022-11-16

## 2022-12-01 RX ORDER — PREDNISONE 10 MG/1
10 TABLET ORAL
Qty: 60 | Refills: 0 | Status: DISCONTINUED | COMMUNITY
Start: 2022-08-13

## 2022-12-01 RX ORDER — PANTOPRAZOLE 40 MG/1
40 TABLET, DELAYED RELEASE ORAL
Qty: 30 | Refills: 0 | Status: DISCONTINUED | COMMUNITY
Start: 2022-09-09

## 2022-12-01 RX ORDER — ISOSORBIDE MONONITRATE 30 MG/1
30 TABLET, EXTENDED RELEASE ORAL
Qty: 30 | Refills: 0 | Status: ACTIVE | COMMUNITY
Start: 2022-11-16

## 2022-12-01 RX ORDER — ERGOCALCIFEROL 1.25 MG/1
1.25 MG CAPSULE, LIQUID FILLED ORAL
Qty: 12 | Refills: 0 | Status: DISCONTINUED | COMMUNITY
Start: 2021-09-06

## 2022-12-01 RX ORDER — METOPROLOL TARTRATE 50 MG/1
50 TABLET, FILM COATED ORAL
Qty: 180 | Refills: 0 | Status: DISCONTINUED | COMMUNITY
Start: 2022-10-10

## 2022-12-01 RX ORDER — MECLIZINE HYDROCHLORIDE 12.5 MG/1
12.5 TABLET ORAL
Qty: 90 | Refills: 0 | Status: DISCONTINUED | COMMUNITY
Start: 2022-09-09

## 2022-12-01 RX ORDER — LANCETS 33 GAUGE
EACH MISCELLANEOUS
Qty: 100 | Refills: 0 | Status: DISCONTINUED | COMMUNITY
Start: 2022-08-26

## 2022-12-01 RX ORDER — METOPROLOL SUCCINATE 50 MG/1
50 TABLET, EXTENDED RELEASE ORAL
Qty: 30 | Refills: 0 | Status: DISCONTINUED | COMMUNITY
Start: 2022-09-09

## 2022-12-01 RX ORDER — ALLOPURINOL 100 MG/1
100 TABLET ORAL DAILY
Qty: 30 | Refills: 3 | Status: DISCONTINUED | COMMUNITY
Start: 2021-01-22 | End: 2022-12-01

## 2022-12-01 RX ORDER — BUDESONIDE 0.5 MG/2ML
0.5 INHALANT ORAL
Qty: 360 | Refills: 0 | Status: DISCONTINUED | COMMUNITY
Start: 2022-07-31

## 2022-12-01 RX ORDER — INSULIN ASPART 100 [IU]/ML
100 INJECTION, SOLUTION INTRAVENOUS; SUBCUTANEOUS
Qty: 15 | Refills: 0 | Status: DISCONTINUED | COMMUNITY
Start: 2022-09-07

## 2022-12-01 RX ORDER — ALBUTEROL SULFATE 90 UG/1
AEROSOL, METERED RESPIRATORY (INHALATION)
Refills: 0 | Status: ACTIVE | COMMUNITY

## 2022-12-01 RX ORDER — NORTRIPTYLINE HYDROCHLORIDE 10 MG/1
10 CAPSULE ORAL
Qty: 40 | Refills: 0 | Status: DISCONTINUED | COMMUNITY
Start: 2022-06-18

## 2022-12-01 RX ORDER — INSULIN ASPART 100 [IU]/ML
100 INJECTION, SOLUTION INTRAVENOUS; SUBCUTANEOUS
Qty: 30 | Refills: 0 | Status: ACTIVE | COMMUNITY
Start: 2022-11-22

## 2022-12-01 RX ORDER — HYDRALAZINE HYDROCHLORIDE 10 MG/1
10 TABLET ORAL
Qty: 90 | Refills: 0 | Status: DISCONTINUED | COMMUNITY
Start: 2022-09-07

## 2022-12-01 RX ORDER — ISOSORBIDE MONONITRATE 30 MG/1
30 TABLET, EXTENDED RELEASE ORAL
Qty: 30 | Refills: 0 | Status: DISCONTINUED | COMMUNITY
Start: 2022-11-16

## 2022-12-01 RX ORDER — AMIODARONE HYDROCHLORIDE 200 MG/1
200 TABLET ORAL
Qty: 30 | Refills: 0 | Status: ACTIVE | COMMUNITY
Start: 2022-11-16

## 2022-12-01 RX ORDER — RAMIPRIL 2.5 MG/1
2.5 CAPSULE ORAL DAILY
Refills: 0 | Status: DISCONTINUED | COMMUNITY
Start: 2021-01-22 | End: 2022-12-01

## 2022-12-01 RX ORDER — INSULIN DETEMIR 100 [IU]/ML
100 INJECTION, SOLUTION SUBCUTANEOUS
Qty: 15 | Refills: 0 | Status: DISCONTINUED | COMMUNITY
Start: 2022-11-22

## 2022-12-01 RX ORDER — HYDRALAZINE HYDROCHLORIDE 25 MG/1
25 TABLET ORAL
Qty: 90 | Refills: 0 | Status: DISCONTINUED | COMMUNITY
Start: 2022-06-29

## 2022-12-01 RX ORDER — BLOOD-GLUCOSE METER
W/DEVICE EACH MISCELLANEOUS
Qty: 1 | Refills: 0 | Status: DISCONTINUED | COMMUNITY
Start: 2022-08-26

## 2022-12-01 RX ORDER — METOPROLOL SUCCINATE 25 MG/1
25 TABLET, EXTENDED RELEASE ORAL DAILY
Refills: 3 | Status: DISCONTINUED | COMMUNITY
Start: 2021-01-22 | End: 2022-12-01

## 2022-12-01 RX ORDER — LANCETS 33 GAUGE
EACH MISCELLANEOUS
Qty: 100 | Refills: 0 | Status: ACTIVE | COMMUNITY
Start: 2022-08-26

## 2022-12-01 RX ORDER — PREDNISONE 5 MG/1
5 TABLET ORAL
Qty: 30 | Refills: 0 | Status: ACTIVE | COMMUNITY
Start: 2022-11-16

## 2022-12-01 RX ORDER — INSULIN DETEMIR 100 [IU]/ML
100 INJECTION, SOLUTION SUBCUTANEOUS
Qty: 15 | Refills: 0 | Status: ACTIVE | COMMUNITY
Start: 2022-11-22

## 2022-12-01 RX ORDER — PANTOPRAZOLE 40 MG/1
40 TABLET, DELAYED RELEASE ORAL
Qty: 30 | Refills: 0 | Status: ACTIVE | COMMUNITY
Start: 2022-09-09

## 2022-12-01 RX ORDER — WARFARIN 5 MG/1
5 TABLET ORAL
Qty: 30 | Refills: 0 | Status: ACTIVE | COMMUNITY
Start: 2022-11-16

## 2022-12-01 RX ORDER — AMLODIPINE BESYLATE 5 MG/1
5 TABLET ORAL
Qty: 90 | Refills: 0 | Status: DISCONTINUED | COMMUNITY
Start: 2022-09-01

## 2022-12-01 RX ORDER — PREDNISONE 5 MG/1
5 TABLET ORAL
Qty: 30 | Refills: 0 | Status: DISCONTINUED | COMMUNITY
Start: 2022-11-16

## 2022-12-01 RX ORDER — ISOPROPYL ALCOHOL 70 ML/100ML
70 SWAB TOPICAL
Qty: 100 | Refills: 0 | Status: ACTIVE | COMMUNITY
Start: 2022-08-26

## 2022-12-01 RX ORDER — BLOOD SUGAR DIAGNOSTIC
STRIP MISCELLANEOUS
Qty: 50 | Refills: 0 | Status: DISCONTINUED | COMMUNITY
Start: 2022-11-22

## 2022-12-01 RX ORDER — ERGOCALCIFEROL 1.25 MG/1
1.25 MG CAPSULE, LIQUID FILLED ORAL
Qty: 12 | Refills: 0 | Status: ACTIVE | COMMUNITY
Start: 2021-09-06

## 2022-12-01 RX ORDER — PEN NEEDLE, DIABETIC 29 G X1/2"
32G X 4 MM NEEDLE, DISPOSABLE MISCELLANEOUS
Qty: 100 | Refills: 0 | Status: ACTIVE | COMMUNITY
Start: 2022-08-26

## 2022-12-01 RX ORDER — INSULIN ASPART 100 [IU]/ML
100 INJECTION, SOLUTION INTRAVENOUS; SUBCUTANEOUS
Qty: 30 | Refills: 0 | Status: DISCONTINUED | COMMUNITY
Start: 2022-11-22

## 2022-12-02 PROBLEM — Z87.09 HISTORY OF CHRONIC OBSTRUCTIVE LUNG DISEASE: Status: RESOLVED | Noted: 2022-12-01 | Resolved: 2022-12-02

## 2022-12-02 PROBLEM — I48.92 ATRIAL FLUTTER: Status: ACTIVE | Noted: 2022-12-01

## 2022-12-02 PROBLEM — Z86.79 HISTORY OF CONGESTIVE HEART FAILURE: Status: RESOLVED | Noted: 2022-12-01 | Resolved: 2022-12-02

## 2022-12-02 PROBLEM — Z86.39 HISTORY OF DIABETES MELLITUS: Status: RESOLVED | Noted: 2022-12-02 | Resolved: 2022-12-02

## 2022-12-02 NOTE — HISTORY OF PRESENT ILLNESS
[Home] : at home, [unfilled] , at the time of the visit. [Other Location: e.g. Home (Enter Location, City,State)___] : at [unfilled] [Other:____] : [unfilled] [Verbal consent obtained from patient] : the patient, [unfilled] [FreeTextEntry1] : Follow up post discharge\par  [de-identified] : This patient is Enrolled in the [employer] Follow Up Program through [employer]\par Copied As per [facility] Discharge Summary\par \par "72 year old Female with pmhx of COPD on home O2(3L) with severe pHTN, MVR/TVR , AF s/pablation,  OHS/MIGUEL on home biPAP, HTN, HLD who presents with one week of vomiting with poor oral intake also complaining of SOB and chest pain. Patient also stated she has been taking prednisone on/off for the past 3 months for chest tightness and does endorse some weight gain. In the ED pt found to have A \par Flutter and given Amio 400 po and Dig loaded. Currently on home 3-4 L NC, No exp wheezing. CXR likely fluid.  Continue proair inhalation and c/w prednisone 5mg since on chronically for 3 months  MISAEL - Nephrology consulted - likely hemodynamically mediated in setting of \par aflutter and HF with recurrent MISAEL post-cardioversion with bradycardia. Scr improving. Continue with supportive care. UA bland with hyaline casts. FeNa elevated however drawn on diuretics (not accurate). CT without hydronephrosis. Avoid nephrotoxins. AFLutter \par s/p ELIZABETH/DCCV 11/4/22, currently junctional rhythm/ectopic atrial rhythm 50s HRs stable with appropriate increase to SR 100s with ambulation, will monitor off of Diltiazem and Metoprolol; no plan for PPM at this time Nephrology following for MISAEL on CKD . Adjust Amiodarone load to 200 mg BID, now on 100mg daily. MVR/TV replacement off AC echo technically difficult, unable to assess due to habitus and positioning per chart review under last name Nancy MRN [id number] - mechanical valve replacement 1999, was previously on coumadin but stopped in 09/2021 after worsening anemia, concern for GI bleed & epistaxis. Continue with ac. ENDO  A1c 10.7. FS adjust insulin for optimal blood sugar control. Hyperkalemia- Kayexalate given, resolved. DCP and medication reconciliation discussed with Dr Newman and in agreement. Patient is hemodynamically stable and cleared for discharge. " The patient was discharged home in stable condition with home care services and follow up care.\par \par During the TeleHealth the patient was in no acute distress.  Able to speak with complete sentences and no audible wheeze noted.\par The patient denies chest pain, cough, hemoptysis, fever, palpitations, syncope.  Pt notes intermittent periods of SOB uses Home Oxygen as needed.  Pt is followed by [facility] as per Home Care RN anticoagulation is followed by [facility]. \par \par CN reviewed that pt  is under the [facility] program for home care until pt is seen by  PCP.   CN will be assigned to sign Home Care orders post-discharge.\par \par

## 2022-12-02 NOTE — PHYSICAL EXAM
[No Acute Distress] : no acute distress [Well Nourished] : well nourished [Normal Sclera/Conjunctiva] : normal sclera/conjunctiva [Normal Outer Ear/Nose] : the outer ears and nose were normal in appearance [No JVD] : no jugular venous distention [No Respiratory Distress] : no respiratory distress  [No Accessory Muscle Use] : no accessory muscle use [Non Tender] : non-tender [de-identified] : Limited Visual Physical Exam during TeleHealth Visit [de-identified] : Awake and alert [de-identified] : Calm

## 2022-12-02 NOTE — ASSESSMENT
[FreeTextEntry1] : "72 year old Female with pmhx of COPD on home O2(3L) with severe pHTN, MVR/TVR , AF s/pablation,  OHS/MIGUEL on home biPAP, HTN, HLD who presents with one week of vomiting with poor oral intake also complaining of SOB and chest pain. Patient also stated she has been taking prednisone on/off for the past 3 months for chest tightness and does endorse some weight gain. In the ED pt found to have A Flutter and given Amio 400 po and Dig loaded. Currently on home 3-4 L NC, No exp wheezing. CXR likely fluid.  Continue proair inhalation and c/w prednisone 5mg since on chronically for 3 months  MISAEL - Nephrology consulted - likely hemodynamically mediated in setting of aflutter and HF with recurrent MISAEL post-cardioversion with bradycardia. Scr improving. Continue with supportive care. UA bland with hyaline casts. FeNa elevated however drawn on diuretics (not accurate). CT without hydronephrosis. Avoid nephrotoxins. AFLutter s/p ELIZABETH/DCCV 11/4/22, currently junctional rhythm/ectopic atrial rhythm 50s HRs stable with appropriate increase to SR 100s with ambulation, will monitor off of Diltiazem and Metoprolol; no plan for PPM at this time Nephrology following for MISAEL on CKD . Adjust Amiodarone load to 200 mg BID, now on 100mg daily. MVR/TV replacement off AC echo technically difficult, unable to assess due to habitus and positioning per chart review under last name Nancy MRN [id number] - mechanical valve replacement 1999, was previously on coumadin but stopped in 09/2021 after worsening anemia, concern for GI bleed & epistaxis. Continue with ac. ENDO  A1c 10.7. FS adjust insulin for optimal blood sugar control. Hyperkalemia- Kayexalate given, resolved. DCP and medication reconciliation discussed with Dr Newman and in agreement. Patient is hemodynamically stable and cleared for discharge. " The patient was discharged home in stable condition with home care services and follow up care.\par \par Aflutter, s/p DCCV: stable\par Continue established treatment plan with follow up\par Continue current regimen Amiodarone, Coumadin\par AC: bleeding precautions, followed by [facility]\par Monitor for worsening  signs and reviewed when to call medical provider\par Pt verbalized good understanding\par Follow up with Medical providers: PCP, NP, Cardiology\par \par COPD: stable\par Continue established treatment plan with follow up\par Continue current medication therapy ProAir, Budesonide, Oxygen\par Review importance of turn, cough and deep breathing\par Reviewed with the pt when to call medical provider if symptoms worsen\par Follow up with PCP, NP, Pulmonary\par \par CHF: stable\par Continue established treatment plan with follow up\par Continue Bumex\par Monitor for worsening  signs and symptoms  and reviewed when to call medical provider\par Pt verbalized good understanding\par Follow up with Medical providers: PCP, Cardiology\par \par DM: stable\par Continue established treatment plan with follow up\par Continue Insulin \par Monitor for signs and symptoms of hypo/hyperglycemia\par Monitor FS\par Follow up with PCP, Endocrinology\par \par \par Pt will need continued Home Care Services RN\par This patient is Enrolled in the [employer] Follow Up Program through [employer]\par As per Home Care RN pt has [facility] provider for follow up care\par Recommended  referral to assist with PCP follow up appointment\par CN reviewed with the pt that pt is under the [facility] program for home care until pt is seen by the PCP.   CN will be assigned to sign Home Care orders post-discharge.\par \par \par

## 2023-01-12 ENCOUNTER — INPATIENT (INPATIENT)
Facility: HOSPITAL | Age: 73
LOS: 11 days | Discharge: HOME HEALTH | End: 2023-01-24
Attending: INTERNAL MEDICINE | Admitting: INTERNAL MEDICINE
Payer: MEDICARE

## 2023-01-12 VITALS
TEMPERATURE: 98 F | HEART RATE: 61 BPM | DIASTOLIC BLOOD PRESSURE: 74 MMHG | SYSTOLIC BLOOD PRESSURE: 128 MMHG | OXYGEN SATURATION: 100 % | RESPIRATION RATE: 16 BRPM

## 2023-01-12 DIAGNOSIS — Z95.4 PRESENCE OF OTHER HEART-VALVE REPLACEMENT: Chronic | ICD-10-CM

## 2023-01-12 LAB
ALBUMIN SERPL ELPH-MCNC: 4 G/DL — SIGNIFICANT CHANGE UP (ref 3.3–5)
ALP SERPL-CCNC: 81 U/L — SIGNIFICANT CHANGE UP (ref 40–120)
ALT FLD-CCNC: 27 U/L — SIGNIFICANT CHANGE UP (ref 4–33)
ANION GAP SERPL CALC-SCNC: 9 MMOL/L — SIGNIFICANT CHANGE UP (ref 7–14)
AST SERPL-CCNC: 25 U/L — SIGNIFICANT CHANGE UP (ref 4–32)
BASE EXCESS BLDV CALC-SCNC: 13.3 MMOL/L — HIGH (ref -2–3)
BASOPHILS # BLD AUTO: 0.02 K/UL — SIGNIFICANT CHANGE UP (ref 0–0.2)
BASOPHILS NFR BLD AUTO: 0.2 % — SIGNIFICANT CHANGE UP (ref 0–2)
BILIRUB SERPL-MCNC: 0.4 MG/DL — SIGNIFICANT CHANGE UP (ref 0.2–1.2)
BLOOD GAS VENOUS COMPREHENSIVE RESULT: SIGNIFICANT CHANGE UP
BUN SERPL-MCNC: 85 MG/DL — HIGH (ref 7–23)
CALCIUM SERPL-MCNC: 10.1 MG/DL — SIGNIFICANT CHANGE UP (ref 8.4–10.5)
CHLORIDE BLDV-SCNC: 98 MMOL/L — SIGNIFICANT CHANGE UP (ref 96–108)
CHLORIDE SERPL-SCNC: 96 MMOL/L — LOW (ref 98–107)
CO2 BLDV-SCNC: 44.7 MMOL/L — HIGH (ref 22–26)
CO2 SERPL-SCNC: 34 MMOL/L — HIGH (ref 22–31)
CREAT SERPL-MCNC: 1.65 MG/DL — HIGH (ref 0.5–1.3)
EGFR: 32 ML/MIN/1.73M2 — LOW
EOSINOPHIL # BLD AUTO: 0 K/UL — SIGNIFICANT CHANGE UP (ref 0–0.5)
EOSINOPHIL NFR BLD AUTO: 0 % — SIGNIFICANT CHANGE UP (ref 0–6)
GAS PNL BLDV: 137 MMOL/L — SIGNIFICANT CHANGE UP (ref 136–145)
GLUCOSE BLDV-MCNC: 152 MG/DL — HIGH (ref 70–99)
GLUCOSE SERPL-MCNC: 141 MG/DL — HIGH (ref 70–99)
HCO3 BLDV-SCNC: 42 MMOL/L — HIGH (ref 22–29)
HCT VFR BLD CALC: 37.1 % — SIGNIFICANT CHANGE UP (ref 34.5–45)
HCT VFR BLDA CALC: 33 % — LOW (ref 34.5–46.5)
HGB BLD CALC-MCNC: 11 G/DL — LOW (ref 11.5–15.5)
HGB BLD-MCNC: 10.7 G/DL — LOW (ref 11.5–15.5)
IANC: 8.85 K/UL — HIGH (ref 1.8–7.4)
IMM GRANULOCYTES NFR BLD AUTO: 1.6 % — HIGH (ref 0–0.9)
LACTATE BLDV-MCNC: 1.6 MMOL/L — SIGNIFICANT CHANGE UP (ref 0.5–2)
LYMPHOCYTES # BLD AUTO: 0.71 K/UL — LOW (ref 1–3.3)
LYMPHOCYTES # BLD AUTO: 7 % — LOW (ref 13–44)
MCHC RBC-ENTMCNC: 28.8 GM/DL — LOW (ref 32–36)
MCHC RBC-ENTMCNC: 29.8 PG — SIGNIFICANT CHANGE UP (ref 27–34)
MCV RBC AUTO: 103.3 FL — HIGH (ref 80–100)
MONOCYTES # BLD AUTO: 0.43 K/UL — SIGNIFICANT CHANGE UP (ref 0–0.9)
MONOCYTES NFR BLD AUTO: 4.2 % — SIGNIFICANT CHANGE UP (ref 2–14)
NEUTROPHILS # BLD AUTO: 8.85 K/UL — HIGH (ref 1.8–7.4)
NEUTROPHILS NFR BLD AUTO: 87 % — HIGH (ref 43–77)
NRBC # BLD: 0 /100 WBCS — SIGNIFICANT CHANGE UP (ref 0–0)
NRBC # FLD: 0.02 K/UL — HIGH (ref 0–0)
NT-PROBNP SERPL-SCNC: 2922 PG/ML — HIGH
PCO2 BLDV: 80 MMHG — HIGH (ref 39–42)
PH BLDV: 7.33 — SIGNIFICANT CHANGE UP (ref 7.32–7.43)
PLATELET # BLD AUTO: 280 K/UL — SIGNIFICANT CHANGE UP (ref 150–400)
PO2 BLDV: 40 MMHG — SIGNIFICANT CHANGE UP
POTASSIUM BLDV-SCNC: 6.1 MMOL/L — HIGH (ref 3.5–5.1)
POTASSIUM SERPL-MCNC: 5.3 MMOL/L — SIGNIFICANT CHANGE UP (ref 3.5–5.3)
POTASSIUM SERPL-SCNC: 5.3 MMOL/L — SIGNIFICANT CHANGE UP (ref 3.5–5.3)
PROT SERPL-MCNC: 8 G/DL — SIGNIFICANT CHANGE UP (ref 6–8.3)
RBC # BLD: 3.59 M/UL — LOW (ref 3.8–5.2)
RBC # FLD: 14.5 % — SIGNIFICANT CHANGE UP (ref 10.3–14.5)
SAO2 % BLDV: 65.4 % — SIGNIFICANT CHANGE UP
SODIUM SERPL-SCNC: 139 MMOL/L — SIGNIFICANT CHANGE UP (ref 135–145)
TROPONIN T, HIGH SENSITIVITY RESULT: 40 NG/L — SIGNIFICANT CHANGE UP
WBC # BLD: 10.17 K/UL — SIGNIFICANT CHANGE UP (ref 3.8–10.5)
WBC # FLD AUTO: 10.17 K/UL — SIGNIFICANT CHANGE UP (ref 3.8–10.5)

## 2023-01-12 PROCEDURE — 71045 X-RAY EXAM CHEST 1 VIEW: CPT | Mod: 26

## 2023-01-12 PROCEDURE — 99285 EMERGENCY DEPT VISIT HI MDM: CPT

## 2023-01-12 RX ORDER — IPRATROPIUM/ALBUTEROL SULFATE 18-103MCG
3 AEROSOL WITH ADAPTER (GRAM) INHALATION
Refills: 0 | Status: COMPLETED | OUTPATIENT
Start: 2023-01-12 | End: 2023-01-12

## 2023-01-12 RX ADMIN — Medication 3 MILLILITER(S): at 22:44

## 2023-01-12 RX ADMIN — Medication 3 MILLILITER(S): at 23:20

## 2023-01-12 RX ADMIN — Medication 40 MILLIGRAM(S): at 22:44

## 2023-01-12 RX ADMIN — Medication 3 MILLILITER(S): at 23:00

## 2023-01-12 NOTE — ED ADULT TRIAGE NOTE - CHIEF COMPLAINT QUOTE
Pt had colonoscopy today- when attempting to start an IV the nurse reportedly blew a vein in arm - now has some swelling/tenderness. Pt wants to know what to do.    Reason for Disposition   Information only question and nurse able to answer    Protocols used: ST NO PROTOCOL AVAILABLE - INFORMATION ONLY-A-[place]       Patient brought to ER by EMS from home for c/o shortness of breath times 4 hours. Pt supposed to be on Coumadin but has not taken it in 4 days because it causes  her to have frequent nosebleeds. Pt has hx of COPD and CHF.

## 2023-01-12 NOTE — ED ADULT NURSE NOTE - CHIEF COMPLAINT QUOTE
Patient brought to ER by EMS from home for c/o shortness of breath times 4 hours. Pt supposed to be on Coumadin but has not taken it in 4 days because it causes  her to have frequent nosebleeds. Pt has hx of COPD and CHF.

## 2023-01-12 NOTE — ED ADULT NURSE NOTE - OBJECTIVE STATEMENT
Received pt in room 18. A&Ox4, ambulatory with cane at baseline, PMH COPD, CHF, BIB EMS c/o shortness of breath times 6 hours. Pt supposed to be on Coumadin but has not taken it in 4 days because it causes her to have frequent nosebleeds. Satting 100% room air, NSR on monitor. VSS. RR even and unlabored. 20g placed in right AC. Labs sent. Medication given. Awaiting further orders from provider.

## 2023-01-13 DIAGNOSIS — J44.1 CHRONIC OBSTRUCTIVE PULMONARY DISEASE WITH (ACUTE) EXACERBATION: ICD-10-CM

## 2023-01-13 DIAGNOSIS — Z29.9 ENCOUNTER FOR PROPHYLACTIC MEASURES, UNSPECIFIED: ICD-10-CM

## 2023-01-13 DIAGNOSIS — I50.33 ACUTE ON CHRONIC DIASTOLIC (CONGESTIVE) HEART FAILURE: ICD-10-CM

## 2023-01-13 DIAGNOSIS — Z95.2 PRESENCE OF PROSTHETIC HEART VALVE: ICD-10-CM

## 2023-01-13 DIAGNOSIS — E11.9 TYPE 2 DIABETES MELLITUS WITHOUT COMPLICATIONS: ICD-10-CM

## 2023-01-13 DIAGNOSIS — I48.91 UNSPECIFIED ATRIAL FIBRILLATION: ICD-10-CM

## 2023-01-13 DIAGNOSIS — G47.33 OBSTRUCTIVE SLEEP APNEA (ADULT) (PEDIATRIC): ICD-10-CM

## 2023-01-13 LAB
A1C WITH ESTIMATED AVERAGE GLUCOSE RESULT: 6.2 % — HIGH (ref 4–5.6)
ANION GAP SERPL CALC-SCNC: 12 MMOL/L — SIGNIFICANT CHANGE UP (ref 7–14)
APTT BLD: 32.8 SEC — SIGNIFICANT CHANGE UP (ref 27–36.3)
APTT BLD: >200 SEC — SIGNIFICANT CHANGE UP (ref 27–36.3)
BASOPHILS # BLD AUTO: 0.01 K/UL — SIGNIFICANT CHANGE UP (ref 0–0.2)
BASOPHILS NFR BLD AUTO: 0.1 % — SIGNIFICANT CHANGE UP (ref 0–2)
BUN SERPL-MCNC: 82 MG/DL — HIGH (ref 7–23)
CALCIUM SERPL-MCNC: 10.4 MG/DL — SIGNIFICANT CHANGE UP (ref 8.4–10.5)
CHLORIDE SERPL-SCNC: 95 MMOL/L — LOW (ref 98–107)
CHOLEST SERPL-MCNC: 229 MG/DL — HIGH
CO2 SERPL-SCNC: 31 MMOL/L — SIGNIFICANT CHANGE UP (ref 22–31)
CREAT SERPL-MCNC: 1.56 MG/DL — HIGH (ref 0.5–1.3)
EGFR: 35 ML/MIN/1.73M2 — LOW
EOSINOPHIL # BLD AUTO: 0 K/UL — SIGNIFICANT CHANGE UP (ref 0–0.5)
EOSINOPHIL NFR BLD AUTO: 0 % — SIGNIFICANT CHANGE UP (ref 0–6)
ESTIMATED AVERAGE GLUCOSE: 131 — SIGNIFICANT CHANGE UP
FLUAV AG NPH QL: SIGNIFICANT CHANGE UP
FLUBV AG NPH QL: SIGNIFICANT CHANGE UP
GLUCOSE BLDC GLUCOMTR-MCNC: 179 MG/DL — HIGH (ref 70–99)
GLUCOSE BLDC GLUCOMTR-MCNC: 192 MG/DL — HIGH (ref 70–99)
GLUCOSE BLDC GLUCOMTR-MCNC: 235 MG/DL — HIGH (ref 70–99)
GLUCOSE BLDC GLUCOMTR-MCNC: 235 MG/DL — HIGH (ref 70–99)
GLUCOSE BLDC GLUCOMTR-MCNC: 280 MG/DL — HIGH (ref 70–99)
GLUCOSE SERPL-MCNC: 191 MG/DL — HIGH (ref 70–99)
HCT VFR BLD CALC: 36.3 % — SIGNIFICANT CHANGE UP (ref 34.5–45)
HCT VFR BLD CALC: 38 % — SIGNIFICANT CHANGE UP (ref 34.5–45)
HDLC SERPL-MCNC: 112 MG/DL — SIGNIFICANT CHANGE UP
HGB BLD-MCNC: 10.8 G/DL — LOW (ref 11.5–15.5)
HGB BLD-MCNC: 11.2 G/DL — LOW (ref 11.5–15.5)
IANC: 9.33 K/UL — HIGH (ref 1.8–7.4)
IMM GRANULOCYTES NFR BLD AUTO: 1 % — HIGH (ref 0–0.9)
INR BLD: 1.06 RATIO — SIGNIFICANT CHANGE UP (ref 0.88–1.16)
LIPID PNL WITH DIRECT LDL SERPL: 105 MG/DL — HIGH
LYMPHOCYTES # BLD AUTO: 0.31 K/UL — LOW (ref 1–3.3)
LYMPHOCYTES # BLD AUTO: 3.1 % — LOW (ref 13–44)
MAGNESIUM SERPL-MCNC: 2.7 MG/DL — HIGH (ref 1.6–2.6)
MCHC RBC-ENTMCNC: 29.5 GM/DL — LOW (ref 32–36)
MCHC RBC-ENTMCNC: 29.8 GM/DL — LOW (ref 32–36)
MCHC RBC-ENTMCNC: 29.9 PG — SIGNIFICANT CHANGE UP (ref 27–34)
MCHC RBC-ENTMCNC: 29.9 PG — SIGNIFICANT CHANGE UP (ref 27–34)
MCV RBC AUTO: 100.6 FL — HIGH (ref 80–100)
MCV RBC AUTO: 101.3 FL — HIGH (ref 80–100)
MONOCYTES # BLD AUTO: 0.21 K/UL — SIGNIFICANT CHANGE UP (ref 0–0.9)
MONOCYTES NFR BLD AUTO: 2.1 % — SIGNIFICANT CHANGE UP (ref 2–14)
NEUTROPHILS # BLD AUTO: 9.33 K/UL — HIGH (ref 1.8–7.4)
NEUTROPHILS NFR BLD AUTO: 93.7 % — HIGH (ref 43–77)
NON HDL CHOLESTEROL: 117 MG/DL — SIGNIFICANT CHANGE UP
NRBC # BLD: 0 /100 WBCS — SIGNIFICANT CHANGE UP (ref 0–0)
NRBC # BLD: 0 /100 WBCS — SIGNIFICANT CHANGE UP (ref 0–0)
NRBC # FLD: 0 K/UL — SIGNIFICANT CHANGE UP (ref 0–0)
NRBC # FLD: 0 K/UL — SIGNIFICANT CHANGE UP (ref 0–0)
NT-PROBNP SERPL-SCNC: 2991 PG/ML — HIGH
PHOSPHATE SERPL-MCNC: 5.8 MG/DL — HIGH (ref 2.5–4.5)
PLATELET # BLD AUTO: 273 K/UL — SIGNIFICANT CHANGE UP (ref 150–400)
PLATELET # BLD AUTO: 286 K/UL — SIGNIFICANT CHANGE UP (ref 150–400)
POTASSIUM SERPL-MCNC: 5.7 MMOL/L — HIGH (ref 3.5–5.3)
POTASSIUM SERPL-SCNC: 5.7 MMOL/L — HIGH (ref 3.5–5.3)
PROTHROM AB SERPL-ACNC: 12.3 SEC — SIGNIFICANT CHANGE UP (ref 10.5–13.4)
RBC # BLD: 3.61 M/UL — LOW (ref 3.8–5.2)
RBC # BLD: 3.75 M/UL — LOW (ref 3.8–5.2)
RBC # FLD: 14.4 % — SIGNIFICANT CHANGE UP (ref 10.3–14.5)
RBC # FLD: 14.5 % — SIGNIFICANT CHANGE UP (ref 10.3–14.5)
RSV RNA NPH QL NAA+NON-PROBE: SIGNIFICANT CHANGE UP
SARS-COV-2 RNA SPEC QL NAA+PROBE: SIGNIFICANT CHANGE UP
SODIUM SERPL-SCNC: 138 MMOL/L — SIGNIFICANT CHANGE UP (ref 135–145)
TRIGL SERPL-MCNC: 61 MG/DL — SIGNIFICANT CHANGE UP
TROPONIN T, HIGH SENSITIVITY RESULT: 38 NG/L — SIGNIFICANT CHANGE UP
TSH SERPL-MCNC: 0.57 UIU/ML — SIGNIFICANT CHANGE UP (ref 0.27–4.2)
WBC # BLD: 10.21 K/UL — SIGNIFICANT CHANGE UP (ref 3.8–10.5)
WBC # BLD: 9.96 K/UL — SIGNIFICANT CHANGE UP (ref 3.8–10.5)
WBC # FLD AUTO: 10.21 K/UL — SIGNIFICANT CHANGE UP (ref 3.8–10.5)
WBC # FLD AUTO: 9.96 K/UL — SIGNIFICANT CHANGE UP (ref 3.8–10.5)

## 2023-01-13 PROCEDURE — 99223 1ST HOSP IP/OBS HIGH 75: CPT

## 2023-01-13 RX ORDER — HEPARIN SODIUM 5000 [USP'U]/ML
INJECTION INTRAVENOUS; SUBCUTANEOUS
Qty: 25000 | Refills: 0 | Status: DISCONTINUED | OUTPATIENT
Start: 2023-01-13 | End: 2023-01-18

## 2023-01-13 RX ORDER — IPRATROPIUM/ALBUTEROL SULFATE 18-103MCG
3 AEROSOL WITH ADAPTER (GRAM) INHALATION EVERY 6 HOURS
Refills: 0 | Status: DISCONTINUED | OUTPATIENT
Start: 2023-01-13 | End: 2023-01-24

## 2023-01-13 RX ORDER — ISOSORBIDE MONONITRATE 60 MG/1
30 TABLET, EXTENDED RELEASE ORAL DAILY
Refills: 0 | Status: DISCONTINUED | OUTPATIENT
Start: 2023-01-13 | End: 2023-01-24

## 2023-01-13 RX ORDER — INSULIN GLARGINE 100 [IU]/ML
20 INJECTION, SOLUTION SUBCUTANEOUS AT BEDTIME
Refills: 0 | Status: DISCONTINUED | OUTPATIENT
Start: 2023-01-13 | End: 2023-01-15

## 2023-01-13 RX ORDER — DEXTROSE 50 % IN WATER 50 %
12.5 SYRINGE (ML) INTRAVENOUS ONCE
Refills: 0 | Status: DISCONTINUED | OUTPATIENT
Start: 2023-01-13 | End: 2023-01-24

## 2023-01-13 RX ORDER — DEXTROSE 50 % IN WATER 50 %
25 SYRINGE (ML) INTRAVENOUS ONCE
Refills: 0 | Status: DISCONTINUED | OUTPATIENT
Start: 2023-01-13 | End: 2023-01-24

## 2023-01-13 RX ORDER — GLUCAGON INJECTION, SOLUTION 0.5 MG/.1ML
1 INJECTION, SOLUTION SUBCUTANEOUS ONCE
Refills: 0 | Status: DISCONTINUED | OUTPATIENT
Start: 2023-01-13 | End: 2023-01-24

## 2023-01-13 RX ORDER — FERROUS SULFATE 325(65) MG
325 TABLET ORAL DAILY
Refills: 0 | Status: DISCONTINUED | OUTPATIENT
Start: 2023-01-13 | End: 2023-01-24

## 2023-01-13 RX ORDER — BUMETANIDE 0.25 MG/ML
2 INJECTION INTRAMUSCULAR; INTRAVENOUS
Refills: 0 | Status: DISCONTINUED | OUTPATIENT
Start: 2023-01-13 | End: 2023-01-17

## 2023-01-13 RX ORDER — ACETAMINOPHEN 500 MG
650 TABLET ORAL EVERY 6 HOURS
Refills: 0 | Status: DISCONTINUED | OUTPATIENT
Start: 2023-01-13 | End: 2023-01-24

## 2023-01-13 RX ORDER — SIMVASTATIN 20 MG/1
10 TABLET, FILM COATED ORAL AT BEDTIME
Refills: 0 | Status: DISCONTINUED | OUTPATIENT
Start: 2023-01-13 | End: 2023-01-24

## 2023-01-13 RX ORDER — AMIODARONE HYDROCHLORIDE 400 MG/1
200 TABLET ORAL DAILY
Refills: 0 | Status: DISCONTINUED | OUTPATIENT
Start: 2023-01-13 | End: 2023-01-24

## 2023-01-13 RX ORDER — PANTOPRAZOLE SODIUM 20 MG/1
40 TABLET, DELAYED RELEASE ORAL
Refills: 0 | Status: DISCONTINUED | OUTPATIENT
Start: 2023-01-13 | End: 2023-01-24

## 2023-01-13 RX ORDER — WARFARIN SODIUM 2.5 MG/1
10 TABLET ORAL ONCE
Refills: 0 | Status: COMPLETED | OUTPATIENT
Start: 2023-01-13 | End: 2023-01-13

## 2023-01-13 RX ORDER — HEPARIN SODIUM 5000 [USP'U]/ML
4000 INJECTION INTRAVENOUS; SUBCUTANEOUS EVERY 6 HOURS
Refills: 0 | Status: DISCONTINUED | OUTPATIENT
Start: 2023-01-13 | End: 2023-01-18

## 2023-01-13 RX ORDER — INSULIN LISPRO 100/ML
8 VIAL (ML) SUBCUTANEOUS
Refills: 0 | Status: DISCONTINUED | OUTPATIENT
Start: 2023-01-13 | End: 2023-01-15

## 2023-01-13 RX ORDER — HEPARIN SODIUM 5000 [USP'U]/ML
9000 INJECTION INTRAVENOUS; SUBCUTANEOUS ONCE
Refills: 0 | Status: COMPLETED | OUTPATIENT
Start: 2023-01-13 | End: 2023-01-13

## 2023-01-13 RX ORDER — BUDESONIDE AND FORMOTEROL FUMARATE DIHYDRATE 160; 4.5 UG/1; UG/1
2 AEROSOL RESPIRATORY (INHALATION)
Refills: 0 | Status: DISCONTINUED | OUTPATIENT
Start: 2023-01-13 | End: 2023-01-24

## 2023-01-13 RX ORDER — SODIUM CHLORIDE 9 MG/ML
1000 INJECTION, SOLUTION INTRAVENOUS
Refills: 0 | Status: DISCONTINUED | OUTPATIENT
Start: 2023-01-13 | End: 2023-01-24

## 2023-01-13 RX ORDER — INSULIN LISPRO 100/ML
VIAL (ML) SUBCUTANEOUS
Refills: 0 | Status: DISCONTINUED | OUTPATIENT
Start: 2023-01-13 | End: 2023-01-24

## 2023-01-13 RX ORDER — DEXTROSE 50 % IN WATER 50 %
15 SYRINGE (ML) INTRAVENOUS ONCE
Refills: 0 | Status: DISCONTINUED | OUTPATIENT
Start: 2023-01-13 | End: 2023-01-24

## 2023-01-13 RX ORDER — INSULIN LISPRO 100/ML
VIAL (ML) SUBCUTANEOUS AT BEDTIME
Refills: 0 | Status: DISCONTINUED | OUTPATIENT
Start: 2023-01-13 | End: 2023-01-24

## 2023-01-13 RX ORDER — HEPARIN SODIUM 5000 [USP'U]/ML
9000 INJECTION INTRAVENOUS; SUBCUTANEOUS EVERY 6 HOURS
Refills: 0 | Status: DISCONTINUED | OUTPATIENT
Start: 2023-01-13 | End: 2023-01-18

## 2023-01-13 RX ADMIN — HEPARIN SODIUM 1900 UNIT(S)/HR: 5000 INJECTION INTRAVENOUS; SUBCUTANEOUS at 13:43

## 2023-01-13 RX ADMIN — HEPARIN SODIUM 0 UNIT(S)/HR: 5000 INJECTION INTRAVENOUS; SUBCUTANEOUS at 22:34

## 2023-01-13 RX ADMIN — ISOSORBIDE MONONITRATE 30 MILLIGRAM(S): 60 TABLET, EXTENDED RELEASE ORAL at 13:42

## 2023-01-13 RX ADMIN — SIMVASTATIN 10 MILLIGRAM(S): 20 TABLET, FILM COATED ORAL at 22:01

## 2023-01-13 RX ADMIN — Medication 40 MILLIGRAM(S): at 21:59

## 2023-01-13 RX ADMIN — HEPARIN SODIUM 0 UNIT(S)/HR: 5000 INJECTION INTRAVENOUS; SUBCUTANEOUS at 22:00

## 2023-01-13 RX ADMIN — Medication 8 UNIT(S): at 17:24

## 2023-01-13 RX ADMIN — Medication 6: at 13:32

## 2023-01-13 RX ADMIN — BUDESONIDE AND FORMOTEROL FUMARATE DIHYDRATE 2 PUFF(S): 160; 4.5 AEROSOL RESPIRATORY (INHALATION) at 22:00

## 2023-01-13 RX ADMIN — Medication 325 MILLIGRAM(S): at 13:41

## 2023-01-13 RX ADMIN — INSULIN GLARGINE 20 UNIT(S): 100 INJECTION, SOLUTION SUBCUTANEOUS at 21:59

## 2023-01-13 RX ADMIN — Medication 40 MILLIGRAM(S): at 06:25

## 2023-01-13 RX ADMIN — HEPARIN SODIUM 1600 UNIT(S)/HR: 5000 INJECTION INTRAVENOUS; SUBCUTANEOUS at 23:17

## 2023-01-13 RX ADMIN — BUMETANIDE 2 MILLIGRAM(S): 0.25 INJECTION INTRAMUSCULAR; INTRAVENOUS at 11:31

## 2023-01-13 RX ADMIN — Medication 40 MILLIGRAM(S): at 13:42

## 2023-01-13 RX ADMIN — AMIODARONE HYDROCHLORIDE 200 MILLIGRAM(S): 400 TABLET ORAL at 17:24

## 2023-01-13 RX ADMIN — WARFARIN SODIUM 10 MILLIGRAM(S): 2.5 TABLET ORAL at 22:38

## 2023-01-13 RX ADMIN — Medication 2: at 17:24

## 2023-01-13 RX ADMIN — HEPARIN SODIUM 9000 UNIT(S): 5000 INJECTION INTRAVENOUS; SUBCUTANEOUS at 13:47

## 2023-01-13 NOTE — H&P ADULT - NSHPPHYSICALEXAM_GEN_ALL_CORE
T(C): 36.4 (01-13-23 @ 10:42), Max: 36.7 (01-12-23 @ 19:13)  HR: 65 (01-13-23 @ 11:30) (57 - 67)  BP: 158/74 (01-13-23 @ 11:30) (125/58 - 158/74)  RR: 20 (01-13-23 @ 10:42) (16 - 20)  SpO2: 98% (01-13-23 @ 10:42) (98% - 100%)    GENERAL: slight resp distress, on 3L NC  HEAD:  Atraumatic, Normocephalic  EYES: EOMI, PERRLA, conjunctiva and sclera clear b/l  NECK: No cervical lymphadenopathy; no obvious masses.   CHEST/LUNG: increased WOB; some difficulty completing full sentences; fine basilar crackles on lung exam. no wheezing.   HEART: Regular rate and rhythm; systolic click; nl S1/S2; 1+ LE edema  ABDOMEN: Soft, Nontender, Nondistended; Bowel sounds present  EXTREMITIES:  2+ Peripheral Pulses; No joint swelling.  PSYCH: normal affect, calm demeanor  NEUROLOGY: Awake and alert; CN 2-12 grossly intact; no obvious FND  SKIN: No rashes or lesions

## 2023-01-13 NOTE — H&P ADULT - PROBLEM SELECTOR PLAN 3
has not taken warfarin in the past 3 days due to nose bleed.   recent ELIZABETH reviewed, no issues with valve opening.   check INR and PTT. if INR< 2.5 will need to start hep gtt.  patient reports warfarin 10mg qd at home.

## 2023-01-13 NOTE — ED PROVIDER NOTE - CLINICAL SUMMARY MEDICAL DECISION MAKING FREE TEXT BOX
EKG bradycardia 58 bpm right bundle branch block.  Normal axis.  T wave inversion aVR, V1, V2, V3.  Overall EKG similar to prior from November 2022.   72-year-old female with shortness of breath differential diagnosis includes, but not limited to, COPD exacerbation, volume overload, infectious etiology (example viral).  Lower suspicion for ACS.  Very low suspicion for VTE at this time.  Patient not in acute respiratory distress.  Plan labs, chest x-ray, nebs and steroid, reassess.

## 2023-01-13 NOTE — ED PROVIDER NOTE - NSICDXFAMILYHX_GEN_ALL_CORE_FT
This is a recent snapshot of the patient's [facility] medical record.  For current drug dose and complete information and questions, call [phone] or In Basket pool, [facility] home infusion ([id number])  CSN Number:  [id number]    
FAMILY HISTORY:  Family history of stroke    Father  Still living? Unknown  Family history of hypertension, Age at diagnosis: Age Unknown    Mother  Still living? Unknown  Family history of hypertension, Age at diagnosis: Age Unknown    Sibling  Still living? Unknown  Family history of hypertension, Age at diagnosis: Age Unknown

## 2023-01-13 NOTE — H&P ADULT - NSHPLANGLIMITEDENGLISH_GEN_A_CORE
No
Tetracycline Pregnancy And Lactation Text: This medication is Pregnancy Category D and not consider safe during pregnancy. It is also excreted in breast milk.
Azithromycin Counseling:  I discussed with the patient the risks of azithromycin including but not limited to GI upset, allergic reaction, drug rash, diarrhea, and yeast infections.
Topical Sulfur Applications Counseling: Topical Sulfur Counseling: Patient counseled that this medication may cause skin irritation or allergic reactions.  In the event of skin irritation, the patient was advised to reduce the amount of the drug applied or use it less frequently.   The patient verbalized understanding of the proper use and possible adverse effects of topical sulfur application.  All of the patient's questions and concerns were addressed.
High Dose Vitamin A Counseling: Side effects reviewed, pt to contact office should one occur.
Tazorac Counseling:  Patient advised that medication is irritating and drying.  Patient may need to apply sparingly and wash off after an hour before eventually leaving it on overnight.  The patient verbalized understanding of the proper use and possible adverse effects of tazorac.  All of the patient's questions and concerns were addressed.
Sarecycline Counseling: Patient advised regarding possible photosensitivity and discoloration of the teeth, skin, lips, tongue and gums.  Patient instructed to avoid sunlight, if possible.  When exposed to sunlight, patients should wear protective clothing, sunglasses, and sunscreen.  The patient was instructed to call the office immediately if the following severe adverse effects occur:  hearing changes, easy bruising/bleeding, severe headache, or vision changes.  The patient verbalized understanding of the proper use and possible adverse effects of sarecycline.  All of the patient's questions and concerns were addressed.
Birth Control Pills Counseling: Birth Control Pill Counseling: I discussed with the patient the potential side effects of OCPs including but not limited to increased risk of stroke, heart attack, thrombophlebitis, deep venous thrombosis, hepatic adenomas, breast changes, GI upset, headaches, and depression.  The patient verbalized understanding of the proper use and possible adverse effects of OCPs. All of the patient's questions and concerns were addressed.
Topical Retinoid Pregnancy And Lactation Text: This medication is Pregnancy Category C. It is unknown if this medication is excreted in breast milk.
Isotretinoin Pregnancy And Lactation Text: This medication is Pregnancy Category X and is considered extremely dangerous during pregnancy. It is unknown if it is excreted in breast milk.
Doxycycline Pregnancy And Lactation Text: This medication is Pregnancy Category D and not consider safe during pregnancy. It is also excreted in breast milk but is considered safe for shorter treatment courses.
High Dose Vitamin A Pregnancy And Lactation Text: High dose vitamin A therapy is contraindicated during pregnancy and breast feeding.
Benzoyl Peroxide Counseling: Patient counseled that medicine may cause skin irritation and bleach clothing.  In the event of skin irritation, the patient was advised to reduce the amount of the drug applied or use it less frequently.   The patient verbalized understanding of the proper use and possible adverse effects of benzoyl peroxide.  All of the patient's questions and concerns were addressed.
Dapsone Counseling: I discussed with the patient the risks of dapsone including but not limited to hemolytic anemia, agranulocytosis, rashes, methemoglobinemia, kidney failure, peripheral neuropathy, headaches, GI upset, and liver toxicity.  Patients who start dapsone require monitoring including baseline LFTs and weekly CBCs for the first month, then every month thereafter.  The patient verbalized understanding of the proper use and possible adverse effects of dapsone.  All of the patient's questions and concerns were addressed.
Erythromycin Counseling:  I discussed with the patient the risks of erythromycin including but not limited to GI upset, allergic reaction, drug rash, diarrhea, increase in liver enzymes, and yeast infections.
Birth Control Pills Pregnancy And Lactation Text: This medication should be avoided if pregnant and for the first 30 days post-partum.
Topical Sulfur Applications Pregnancy And Lactation Text: This medication is Pregnancy Category C and has an unknown safety profile during pregnancy. It is unknown if this topical medication is excreted in breast milk.
Azithromycin Pregnancy And Lactation Text: This medication is considered safe during pregnancy and is also secreted in breast milk.
Dapsone Pregnancy And Lactation Text: This medication is Pregnancy Category C and is not considered safe during pregnancy or breast feeding.
Minocycline Counseling: Patient advised regarding possible photosensitivity and discoloration of the teeth, skin, lips, tongue and gums.  Patient instructed to avoid sunlight, if possible.  When exposed to sunlight, patients should wear protective clothing, sunglasses, and sunscreen.  The patient was instructed to call the office immediately if the following severe adverse effects occur:  hearing changes, easy bruising/bleeding, severe headache, or vision changes.  The patient verbalized understanding of the proper use and possible adverse effects of minocycline.  All of the patient's questions and concerns were addressed.
Topical Clindamycin Counseling: Patient counseled that this medication may cause skin irritation or allergic reactions.  In the event of skin irritation, the patient was advised to reduce the amount of the drug applied or use it less frequently.   The patient verbalized understanding of the proper use and possible adverse effects of clindamycin.  All of the patient's questions and concerns were addressed.
Bactrim Counseling:  I discussed with the patient the risks of sulfa antibiotics including but not limited to GI upset, allergic reaction, drug rash, diarrhea, dizziness, photosensitivity, and yeast infections.  Rarely, more serious reactions can occur including but not limited to aplastic anemia, agranulocytosis, methemoglobinemia, blood dyscrasias, liver or kidney failure, lung infiltrates or desquamative/blistering drug rashes.
Erythromycin Pregnancy And Lactation Text: This medication is Pregnancy Category B and is considered safe during pregnancy. It is also excreted in breast milk.
Tazorac Pregnancy And Lactation Text: This medication is not safe during pregnancy. It is unknown if this medication is excreted in breast milk.
Benzoyl Peroxide Pregnancy And Lactation Text: This medication is Pregnancy Category C. It is unknown if benzoyl peroxide is excreted in breast milk.
Spironolactone Counseling: Patient advised regarding risks of diarrhea, abdominal pain, hyperkalemia, birth defects (for female patients), liver toxicity and renal toxicity. The patient may need blood work to monitor liver and kidney function and potassium levels while on therapy. The patient verbalized understanding of the proper use and possible adverse effects of spironolactone.  All of the patient's questions and concerns were addressed.
Doxycycline Counseling:  Patient counseled regarding possible photosensitivity and increased risk for sunburn.  Patient instructed to avoid sunlight, if possible.  When exposed to sunlight, patients should wear protective clothing, sunglasses, and sunscreen.  The patient was instructed to call the office immediately if the following severe adverse effects occur:  hearing changes, easy bruising/bleeding, severe headache, or vision changes.  The patient verbalized understanding of the proper use and possible adverse effects of doxycycline.  All of the patient's questions and concerns were addressed.
Use Enhanced Medication Counseling?: No
Tetracycline Counseling: Patient counseled regarding possible photosensitivity and increased risk for sunburn.  Patient instructed to avoid sunlight, if possible.  When exposed to sunlight, patients should wear protective clothing, sunglasses, and sunscreen.  The patient was instructed to call the office immediately if the following severe adverse effects occur:  hearing changes, easy bruising/bleeding, severe headache, or vision changes.  The patient verbalized understanding of the proper use and possible adverse effects of tetracycline.  All of the patient's questions and concerns were addressed. Patient understands to avoid pregnancy while on therapy due to potential birth defects.
Topical Clindamycin Pregnancy And Lactation Text: This medication is Pregnancy Category B and is considered safe during pregnancy. It is unknown if it is excreted in breast milk.
Topical Retinoid counseling:  Patient advised to apply a pea-sized amount only at bedtime and wait 30 minutes after washing their face before applying.  If too drying, patient may add a non-comedogenic moisturizer. The patient verbalized understanding of the proper use and possible adverse effects of retinoids.  All of the patient's questions and concerns were addressed.
Isotretinoin Counseling: Patient should get monthly blood tests, not donate blood, not drive at night if vision affected, not share medication, and not undergo elective surgery for 6 months after tx completed. Side effects reviewed, pt to contact office should one occur.
Bactrim Pregnancy And Lactation Text: This medication is Pregnancy Category D and is known to cause fetal risk.  It is also excreted in breast milk.
Spironolactone Pregnancy And Lactation Text: This medication can cause feminization of the male fetus and should be avoided during pregnancy. The active metabolite is also found in breast milk.
Detail Level: Zone

## 2023-01-13 NOTE — ED PROVIDER NOTE - OBJECTIVE STATEMENT
72-year-old female past medical history COPD/MIGUEL, pulmonary hypertension, mitral valve repair, atrial fibrillation status post ablation, ? CHF presents for worsening shortness of breath, orthopnea as well as intermittent chest pain lasting several seconds.  Chest pain with activity and at rest spontaneously resolving.  She denies fever, chills, runny nose, sore throat does report some coughing today.  Denies abdominal pain, nausea, vomiting, change in her chronic lower extremity swelling.  Patient stopped her Coumadin several days ago due to nosebleeding denies any bleeding today.  Reports adherence to her medications.  Denies tobacco use.

## 2023-01-13 NOTE — H&P ADULT - HISTORY OF PRESENT ILLNESS
72F with PMH COPD/MIGUEL/OHS (home BiPAP/ 3L NC), pHTN, MVR on Coumadin AF s/p ablation and recent DCCV on 11/4/22 pw SOB. Patient reports she has been feeling progressive SOB over the past 1 week, associated with orthopniea and intermittent CP. Per patient CP has been chronic and no change in intensity. She received treatment and steroids in ED which help with her symptoms. denied fever, chill, coughing, abd pain, worsening LE edema or diarrhea.   of note, patient endorsed she has not taking warfarin for the past 3 days due to nose bleed.

## 2023-01-13 NOTE — ED PROVIDER NOTE - PROGRESS NOTE DETAILS
Nikky Herzog, PGY1: Pt reassessed. Reports feeling more comfortable with breathing after treatment. Appears to be breathing more comfortably with less tachypnea at rest. However, when transferring from bed to chair, pt becomes dyspneic. Pt reports this is not her baseline

## 2023-01-13 NOTE — PATIENT PROFILE ADULT - FALL HARM RISK - HARM RISK INTERVENTIONS

## 2023-01-13 NOTE — H&P ADULT - ASSESSMENT
72F with h/o COPD, pHTN, mech MVR on coumadin, Afib/flutter, MIGUEL presented with worsening SOB from baseline. On admission concern for COPD exacerbation. CXR is concerning for CHF. admit for management.

## 2023-01-13 NOTE — ED PROVIDER NOTE - PHYSICAL EXAMINATION
GEN: no acute respiratory distress. nontoxic,   HEENT: NCAT. face symmetrical. PERRL 4mm, EOMI, MMM, oropharynx wnl.  Neck: no JVD, trachea midline, no LAD  CV: RRR. +S1S2, no murmur.  Chest: CTA B/l. no wheezing, rales, rhonchi. no retractions. mildly tachypneic  ABD: +BS, soft, non distended, non tender.   : no cva or suprapubic tenderness  MSK: No clubbing, cyanosis, edema. FROM of all extremities. no tenderness to palpation. No midline or paraspinal tenderness. no spinal step-offs.  Neuro: AAOX3.  Sensation intact, motor 5/5 throughout.  SKIN: No rash

## 2023-01-13 NOTE — H&P ADULT - PROBLEM SELECTOR PLAN 2
elevated proBNP, b/l LE edema, +orthopnea.   patient is at baseline O2 req, but symptomatic clinically.   on Bumex 2mg PO bid. will start Bumex 4mg IV BID  strict I&O, daily weight. admit to tele  recent echo was poor study due to body habitus.   monitor clinical response to diuretics.  c/w isosorbide and statin.

## 2023-01-13 NOTE — H&P ADULT - NSHPREVIEWOFSYSTEMS_GEN_ALL_CORE
CONSTITUTIONAL: No fever; No chills; No night sweats; No weight loss; No fatigue  EYES: No eye pain; No blurry vision  ENMT:  No difficulty hearing; No sinus or throat pain  NECK: No pain or stiffness  RESPIRATORY: No cough; No wheezing; No hemoptysis; +shortness of breath; No sputum production  CARDIOVASCULAR: +chest pain; No palpitations; +leg swelling  GASTROINTESTINAL: No abdominal pain; No nausea; No vomiting; No hematemesis; No diarrhea; No constipation. No melena or hematochezia.  GENITOURINARY: No dysuria; No frequency; No hematuria; No incontinence  NEUROLOGICAL: No headaches; No loss of strength; No numbness  SKIN: No itching/burning; No rashes or lesions   MUSCULOSKELETAL: No joint pain or swelling; No muscle, back, or extremity pain  HEME/LYMPH: No easy bruising, or bleeding gums; +nose bleeding.

## 2023-01-13 NOTE — H&P ADULT - PROBLEM SELECTOR PLAN 1
COVID-19 Screening:    Does the patient OR patient’s household members have any of the following symptoms?  • Temperature: Fever >100.4°F or >38.0°C?  No    • Respiratory symptoms: New or worsening cough, shortness of breath, or sore throat? No    • GI symptoms: New onset of nausea, vomiting or diarrhea?  Yes, for patient: vomiting and diarrhea.    • Miscellaneous: New onset of loss of taste or smell, chills, repeated shaking with chills, muscle pain, headache? No    Has the patient or a household member been tested for COVID-19 in the past 14 days?  No  (if yes, include result)    Have you had known exposure to COVID-19 (contact with someone with known/suspected COVID-19?) in the past 14 days? No      Patient presents to the urgent care with a complaint of abd pain and diarrhea with episodes of vomiting. Was referred by Dr Gomez to get possible stool tests.    s/p solumedrol, duoneb in ED with improvement.   no wheezing on lung exam.   currently mentating abel.   c/w solumedrol. duoneb rtc.   c/w home symbicort  provide O2 vis humidified air. might need this at home to prevent nose bleed at home.

## 2023-01-14 LAB
ANION GAP SERPL CALC-SCNC: 10 MMOL/L — SIGNIFICANT CHANGE UP (ref 7–14)
ANION GAP SERPL CALC-SCNC: 8 MMOL/L — SIGNIFICANT CHANGE UP (ref 7–14)
APTT BLD: 185.6 SEC — SIGNIFICANT CHANGE UP (ref 27–36.3)
APTT BLD: 67.3 SEC — HIGH (ref 27–36.3)
APTT BLD: 97.5 SEC — HIGH (ref 27–36.3)
BASOPHILS # BLD AUTO: 0.01 K/UL — SIGNIFICANT CHANGE UP (ref 0–0.2)
BASOPHILS NFR BLD AUTO: 0.1 % — SIGNIFICANT CHANGE UP (ref 0–2)
BUN SERPL-MCNC: 97 MG/DL — HIGH (ref 7–23)
BUN SERPL-MCNC: 97 MG/DL — HIGH (ref 7–23)
CALCIUM SERPL-MCNC: 10 MG/DL — SIGNIFICANT CHANGE UP (ref 8.4–10.5)
CALCIUM SERPL-MCNC: 10.2 MG/DL — SIGNIFICANT CHANGE UP (ref 8.4–10.5)
CHLORIDE SERPL-SCNC: 92 MMOL/L — LOW (ref 98–107)
CHLORIDE SERPL-SCNC: 96 MMOL/L — LOW (ref 98–107)
CO2 SERPL-SCNC: 32 MMOL/L — HIGH (ref 22–31)
CO2 SERPL-SCNC: 35 MMOL/L — HIGH (ref 22–31)
CREAT SERPL-MCNC: 1.78 MG/DL — HIGH (ref 0.5–1.3)
CREAT SERPL-MCNC: 1.81 MG/DL — HIGH (ref 0.5–1.3)
EGFR: 29 ML/MIN/1.73M2 — LOW
EGFR: 30 ML/MIN/1.73M2 — LOW
EOSINOPHIL # BLD AUTO: 0 K/UL — SIGNIFICANT CHANGE UP (ref 0–0.5)
EOSINOPHIL NFR BLD AUTO: 0 % — SIGNIFICANT CHANGE UP (ref 0–6)
GLUCOSE BLDC GLUCOMTR-MCNC: 162 MG/DL — HIGH (ref 70–99)
GLUCOSE BLDC GLUCOMTR-MCNC: 184 MG/DL — HIGH (ref 70–99)
GLUCOSE BLDC GLUCOMTR-MCNC: 250 MG/DL — HIGH (ref 70–99)
GLUCOSE BLDC GLUCOMTR-MCNC: 308 MG/DL — HIGH (ref 70–99)
GLUCOSE SERPL-MCNC: 218 MG/DL — HIGH (ref 70–99)
GLUCOSE SERPL-MCNC: 224 MG/DL — HIGH (ref 70–99)
HCT VFR BLD CALC: 37.1 % — SIGNIFICANT CHANGE UP (ref 34.5–45)
HCT VFR BLD CALC: 37.1 % — SIGNIFICANT CHANGE UP (ref 34.5–45)
HGB BLD-MCNC: 10.8 G/DL — LOW (ref 11.5–15.5)
HGB BLD-MCNC: 11.1 G/DL — LOW (ref 11.5–15.5)
IANC: 7.9 K/UL — HIGH (ref 1.8–7.4)
IMM GRANULOCYTES NFR BLD AUTO: 1.1 % — HIGH (ref 0–0.9)
INR BLD: 1.09 RATIO — SIGNIFICANT CHANGE UP (ref 0.88–1.16)
LYMPHOCYTES # BLD AUTO: 0.41 K/UL — LOW (ref 1–3.3)
LYMPHOCYTES # BLD AUTO: 4.6 % — LOW (ref 13–44)
MAGNESIUM SERPL-MCNC: 2.7 MG/DL — HIGH (ref 1.6–2.6)
MAGNESIUM SERPL-MCNC: 2.7 MG/DL — HIGH (ref 1.6–2.6)
MCHC RBC-ENTMCNC: 29.1 GM/DL — LOW (ref 32–36)
MCHC RBC-ENTMCNC: 29.8 PG — SIGNIFICANT CHANGE UP (ref 27–34)
MCHC RBC-ENTMCNC: 29.9 GM/DL — LOW (ref 32–36)
MCHC RBC-ENTMCNC: 30 PG — SIGNIFICANT CHANGE UP (ref 27–34)
MCV RBC AUTO: 100.3 FL — HIGH (ref 80–100)
MCV RBC AUTO: 102.5 FL — HIGH (ref 80–100)
MONOCYTES # BLD AUTO: 0.51 K/UL — SIGNIFICANT CHANGE UP (ref 0–0.9)
MONOCYTES NFR BLD AUTO: 5.7 % — SIGNIFICANT CHANGE UP (ref 2–14)
NEUTROPHILS # BLD AUTO: 7.9 K/UL — HIGH (ref 1.8–7.4)
NEUTROPHILS NFR BLD AUTO: 88.5 % — HIGH (ref 43–77)
NRBC # BLD: 0 /100 WBCS — SIGNIFICANT CHANGE UP (ref 0–0)
NRBC # BLD: 0 /100 WBCS — SIGNIFICANT CHANGE UP (ref 0–0)
NRBC # FLD: 0 K/UL — SIGNIFICANT CHANGE UP (ref 0–0)
NRBC # FLD: 0 K/UL — SIGNIFICANT CHANGE UP (ref 0–0)
PHOSPHATE SERPL-MCNC: 5.2 MG/DL — HIGH (ref 2.5–4.5)
PHOSPHATE SERPL-MCNC: 5.8 MG/DL — HIGH (ref 2.5–4.5)
PLATELET # BLD AUTO: 281 K/UL — SIGNIFICANT CHANGE UP (ref 150–400)
PLATELET # BLD AUTO: 290 K/UL — SIGNIFICANT CHANGE UP (ref 150–400)
POTASSIUM SERPL-MCNC: 5.3 MMOL/L — SIGNIFICANT CHANGE UP (ref 3.5–5.3)
POTASSIUM SERPL-MCNC: 5.5 MMOL/L — HIGH (ref 3.5–5.3)
POTASSIUM SERPL-SCNC: 5.3 MMOL/L — SIGNIFICANT CHANGE UP (ref 3.5–5.3)
POTASSIUM SERPL-SCNC: 5.5 MMOL/L — HIGH (ref 3.5–5.3)
PROTHROM AB SERPL-ACNC: 12.7 SEC — SIGNIFICANT CHANGE UP (ref 10.5–13.4)
RBC # BLD: 3.62 M/UL — LOW (ref 3.8–5.2)
RBC # BLD: 3.7 M/UL — LOW (ref 3.8–5.2)
RBC # FLD: 14.4 % — SIGNIFICANT CHANGE UP (ref 10.3–14.5)
RBC # FLD: 14.4 % — SIGNIFICANT CHANGE UP (ref 10.3–14.5)
SODIUM SERPL-SCNC: 135 MMOL/L — SIGNIFICANT CHANGE UP (ref 135–145)
SODIUM SERPL-SCNC: 138 MMOL/L — SIGNIFICANT CHANGE UP (ref 135–145)
WBC # BLD: 8.93 K/UL — SIGNIFICANT CHANGE UP (ref 3.8–10.5)
WBC # BLD: 9.97 K/UL — SIGNIFICANT CHANGE UP (ref 3.8–10.5)
WBC # FLD AUTO: 8.93 K/UL — SIGNIFICANT CHANGE UP (ref 3.8–10.5)
WBC # FLD AUTO: 9.97 K/UL — SIGNIFICANT CHANGE UP (ref 3.8–10.5)

## 2023-01-14 RX ORDER — LIDOCAINE 4 G/100G
1 CREAM TOPICAL DAILY
Refills: 0 | Status: DISCONTINUED | OUTPATIENT
Start: 2023-01-14 | End: 2023-01-24

## 2023-01-14 RX ORDER — METOPROLOL TARTRATE 50 MG
5 TABLET ORAL ONCE
Refills: 0 | Status: DISCONTINUED | OUTPATIENT
Start: 2023-01-14 | End: 2023-01-14

## 2023-01-14 RX ORDER — WARFARIN SODIUM 2.5 MG/1
10 TABLET ORAL ONCE
Refills: 0 | Status: COMPLETED | OUTPATIENT
Start: 2023-01-14 | End: 2023-01-14

## 2023-01-14 RX ORDER — SODIUM CHLORIDE 0.65 %
1 AEROSOL, SPRAY (ML) NASAL THREE TIMES A DAY
Refills: 0 | Status: DISCONTINUED | OUTPATIENT
Start: 2023-01-14 | End: 2023-01-24

## 2023-01-14 RX ADMIN — Medication 2: at 18:14

## 2023-01-14 RX ADMIN — WARFARIN SODIUM 10 MILLIGRAM(S): 2.5 TABLET ORAL at 22:20

## 2023-01-14 RX ADMIN — INSULIN GLARGINE 20 UNIT(S): 100 INJECTION, SOLUTION SUBCUTANEOUS at 22:23

## 2023-01-14 RX ADMIN — Medication 4: at 15:12

## 2023-01-14 RX ADMIN — Medication 325 MILLIGRAM(S): at 14:50

## 2023-01-14 RX ADMIN — HEPARIN SODIUM 1300 UNIT(S)/HR: 5000 INJECTION INTRAVENOUS; SUBCUTANEOUS at 15:17

## 2023-01-14 RX ADMIN — Medication 4: at 22:23

## 2023-01-14 RX ADMIN — BUDESONIDE AND FORMOTEROL FUMARATE DIHYDRATE 2 PUFF(S): 160; 4.5 AEROSOL RESPIRATORY (INHALATION) at 22:22

## 2023-01-14 RX ADMIN — BUMETANIDE 2 MILLIGRAM(S): 0.25 INJECTION INTRAMUSCULAR; INTRAVENOUS at 14:51

## 2023-01-14 RX ADMIN — HEPARIN SODIUM 1300 UNIT(S)/HR: 5000 INJECTION INTRAVENOUS; SUBCUTANEOUS at 19:13

## 2023-01-14 RX ADMIN — Medication 40 MILLIGRAM(S): at 06:10

## 2023-01-14 RX ADMIN — Medication 8 UNIT(S): at 15:13

## 2023-01-14 RX ADMIN — HEPARIN SODIUM 1600 UNIT(S)/HR: 5000 INJECTION INTRAVENOUS; SUBCUTANEOUS at 06:25

## 2023-01-14 RX ADMIN — Medication 40 MILLIGRAM(S): at 22:21

## 2023-01-14 RX ADMIN — PANTOPRAZOLE SODIUM 40 MILLIGRAM(S): 20 TABLET, DELAYED RELEASE ORAL at 09:01

## 2023-01-14 RX ADMIN — HEPARIN SODIUM 0 UNIT(S)/HR: 5000 INJECTION INTRAVENOUS; SUBCUTANEOUS at 07:50

## 2023-01-14 RX ADMIN — Medication 1 SPRAY(S): at 15:45

## 2023-01-14 RX ADMIN — Medication 3 MILLILITER(S): at 10:39

## 2023-01-14 RX ADMIN — HEPARIN SODIUM 1300 UNIT(S)/HR: 5000 INJECTION INTRAVENOUS; SUBCUTANEOUS at 22:27

## 2023-01-14 RX ADMIN — LIDOCAINE 1 PATCH: 4 CREAM TOPICAL at 18:34

## 2023-01-14 RX ADMIN — Medication 650 MILLIGRAM(S): at 06:11

## 2023-01-14 RX ADMIN — HEPARIN SODIUM 0 UNIT(S)/HR: 5000 INJECTION INTRAVENOUS; SUBCUTANEOUS at 06:45

## 2023-01-14 RX ADMIN — SIMVASTATIN 10 MILLIGRAM(S): 20 TABLET, FILM COATED ORAL at 22:20

## 2023-01-14 RX ADMIN — ISOSORBIDE MONONITRATE 30 MILLIGRAM(S): 60 TABLET, EXTENDED RELEASE ORAL at 14:49

## 2023-01-14 RX ADMIN — LIDOCAINE 1 PATCH: 4 CREAM TOPICAL at 15:57

## 2023-01-14 RX ADMIN — Medication 2: at 09:02

## 2023-01-14 RX ADMIN — Medication 3 MILLILITER(S): at 16:15

## 2023-01-14 RX ADMIN — LIDOCAINE 1 PATCH: 4 CREAM TOPICAL at 18:33

## 2023-01-14 RX ADMIN — Medication 3 MILLILITER(S): at 02:51

## 2023-01-14 RX ADMIN — Medication 40 MILLIGRAM(S): at 14:50

## 2023-01-14 RX ADMIN — HEPARIN SODIUM 1300 UNIT(S)/HR: 5000 INJECTION INTRAVENOUS; SUBCUTANEOUS at 07:53

## 2023-01-14 RX ADMIN — AMIODARONE HYDROCHLORIDE 200 MILLIGRAM(S): 400 TABLET ORAL at 06:10

## 2023-01-14 RX ADMIN — Medication 8 UNIT(S): at 18:14

## 2023-01-14 RX ADMIN — LIDOCAINE 1 PATCH: 4 CREAM TOPICAL at 15:58

## 2023-01-14 RX ADMIN — Medication 8 UNIT(S): at 09:02

## 2023-01-14 RX ADMIN — Medication 3 MILLILITER(S): at 20:46

## 2023-01-14 RX ADMIN — BUMETANIDE 2 MILLIGRAM(S): 0.25 INJECTION INTRAMUSCULAR; INTRAVENOUS at 06:10

## 2023-01-14 NOTE — PROGRESS NOTE ADULT - SUBJECTIVE AND OBJECTIVE BOX
SUBJECTIVE / OVERNIGHT EVENTS:pt seen and examined  01-14-23     MEDICATIONS  (STANDING):  albuterol/ipratropium for Nebulization 3 milliLiter(s) Nebulizer every 6 hours  aMIOdarone    Tablet 200 milliGRAM(s) Oral daily  budesonide 160 MICROgram(s)/formoterol 4.5 MICROgram(s) Inhaler 2 Puff(s) Inhalation two times a day  buMETAnide Injectable 2 milliGRAM(s) IV Push two times a day  dextrose 5%. 1000 milliLiter(s) (50 mL/Hr) IV Continuous <Continuous>  dextrose 5%. 1000 milliLiter(s) (100 mL/Hr) IV Continuous <Continuous>  dextrose 50% Injectable 25 Gram(s) IV Push once  dextrose 50% Injectable 12.5 Gram(s) IV Push once  dextrose 50% Injectable 25 Gram(s) IV Push once  ferrous    sulfate 325 milliGRAM(s) Oral daily  glucagon  Injectable 1 milliGRAM(s) IntraMuscular once  heparin  Infusion.  Unit(s)/Hr (19 mL/Hr) IV Continuous <Continuous>  insulin glargine Injectable (LANTUS) 20 Unit(s) SubCutaneous at bedtime  insulin lispro (ADMELOG) corrective regimen sliding scale   SubCutaneous three times a day before meals  insulin lispro (ADMELOG) corrective regimen sliding scale   SubCutaneous at bedtime  insulin lispro Injectable (ADMELOG) 8 Unit(s) SubCutaneous three times a day before meals  isosorbide   mononitrate ER Tablet (IMDUR) 30 milliGRAM(s) Oral daily  lidocaine   4% Patch 1 Patch Transdermal daily  lidocaine   4% Patch 1 Patch Transdermal daily  methylPREDNISolone sodium succinate Injectable 40 milliGRAM(s) IV Push every 8 hours  pantoprazole    Tablet 40 milliGRAM(s) Oral before breakfast  simvastatin 10 milliGRAM(s) Oral at bedtime  warfarin 10 milliGRAM(s) Oral once    MEDICATIONS  (PRN):  acetaminophen     Tablet .. 650 milliGRAM(s) Oral every 6 hours PRN Temp greater or equal to 38C (100.4F), Mild Pain (1 - 3), Moderate Pain (4 - 6)  dextrose Oral Gel 15 Gram(s) Oral once PRN Blood Glucose LESS THAN 70 milliGRAM(s)/deciliter  heparin   Injectable 9000 Unit(s) IV Push every 6 hours PRN For aPTT less than 40  heparin   Injectable 4000 Unit(s) IV Push every 6 hours PRN For aPTT between 40 - 57  sodium chloride 0.65% Nasal 1 Spray(s) Both Nostrils three times a day PRN Nasal Congestion    T(C): 36.8 (01-14-23 @ 18:05), Max: 36.9 (01-14-23 @ 11:44)  HR: 72 (01-14-23 @ 18:05) (62 - 72)  BP: 152/94 (01-14-23 @ 18:05) (130/54 - 152/94)  RR: 18 (01-14-23 @ 18:05) (16 - 18)  SpO2: 100% (01-14-23 @ 18:05) (98% - 100%)    CAPILLARY BLOOD GLUCOSE      POCT Blood Glucose.: 162 mg/dL (14 Jan 2023 17:21)  POCT Blood Glucose.: 250 mg/dL (14 Jan 2023 13:24)  POCT Blood Glucose.: 184 mg/dL (14 Jan 2023 08:52)  POCT Blood Glucose.: 235 mg/dL (13 Jan 2023 22:34)  POCT Blood Glucose.: 235 mg/dL (13 Jan 2023 21:44)    I&O's Summary    13 Jan 2023 07:01  -  14 Jan 2023 07:00  --------------------------------------------------------  IN: 0 mL / OUT: 400 mL / NET: -400 mL    14 Jan 2023 07:01  -  14 Jan 2023 18:15  --------------------------------------------------------  IN: 1060 mL / OUT: 0 mL / NET: 1060 mL        Constitutional: No fever, fatigue  Skin: No rash.  Eyes: No recent vision problems or eye pain.  ENT: No congestion, ear pain, or sore throat.  Cardiovascular: No chest pain or palpation.  Respiratory: No cough, shortness of breath, congestion, or wheezing.  Gastrointestinal: No abdominal pain, nausea, vomiting, or diarrhea.  Genitourinary: No dysuria.  Musculoskeletal: No joint swelling.  Neurologic: No headache.    PHYSICAL EXAM:  GENERAL: NAD  EYES: EOMI, PERRLA  NECK: Supple, No JVD  CHEST/LUNG: dec breath sounds at bases  HEART:  S1 , S2 +  ABDOMEN: soft, bs+  EXTREMITIES:  edema+  NEUROLOGY:alert awake oriented       LABS:                        10.8   8.93  )-----------( 281      ( 14 Jan 2023 05:22 )             37.1     01-14    138  |  96<L>  |  97<H>  ----------------------------<  218<H>  5.3   |  32<H>  |  1.78<H>    Ca    10.0      14 Jan 2023 05:22  Phos  5.8     01-14  Mg     2.70     01-14    TPro  8.0  /  Alb  4.0  /  TBili  0.4  /  DBili  x   /  AST  25  /  ALT  27  /  AlkPhos  81  01-12    PT/INR - ( 14 Jan 2023 05:22 )   PT: 12.7 sec;   INR: 1.09 ratio         PTT - ( 14 Jan 2023 14:21 )  PTT:97.5 sec          RADIOLOGY & ADDITIONAL TESTS:    Imaging Personally Reviewed:yes    Consultant(s) Notes Reviewed:  yes    Care Discussed with Consultants/Other Providers:yes

## 2023-01-15 LAB
ANION GAP SERPL CALC-SCNC: 13 MMOL/L — SIGNIFICANT CHANGE UP (ref 7–14)
APTT BLD: 99.8 SEC — HIGH (ref 27–36.3)
BASOPHILS # BLD AUTO: 0.01 K/UL — SIGNIFICANT CHANGE UP (ref 0–0.2)
BASOPHILS NFR BLD AUTO: 0.1 % — SIGNIFICANT CHANGE UP (ref 0–2)
BUN SERPL-MCNC: 110 MG/DL — HIGH (ref 7–23)
CALCIUM SERPL-MCNC: 10.1 MG/DL — SIGNIFICANT CHANGE UP (ref 8.4–10.5)
CHLORIDE SERPL-SCNC: 94 MMOL/L — LOW (ref 98–107)
CK MB BLD-MCNC: SIGNIFICANT CHANGE UP % (ref 0–2.5)
CK MB CFR SERPL CALC: 3.4 NG/ML — SIGNIFICANT CHANGE UP
CK SERPL-CCNC: 35 U/L — SIGNIFICANT CHANGE UP (ref 25–170)
CO2 SERPL-SCNC: 29 MMOL/L — SIGNIFICANT CHANGE UP (ref 22–31)
CREAT SERPL-MCNC: 1.86 MG/DL — HIGH (ref 0.5–1.3)
EGFR: 28 ML/MIN/1.73M2 — LOW
EOSINOPHIL # BLD AUTO: 0 K/UL — SIGNIFICANT CHANGE UP (ref 0–0.5)
EOSINOPHIL NFR BLD AUTO: 0 % — SIGNIFICANT CHANGE UP (ref 0–6)
GLUCOSE BLDC GLUCOMTR-MCNC: 198 MG/DL — HIGH (ref 70–99)
GLUCOSE BLDC GLUCOMTR-MCNC: 218 MG/DL — HIGH (ref 70–99)
GLUCOSE BLDC GLUCOMTR-MCNC: 219 MG/DL — HIGH (ref 70–99)
GLUCOSE BLDC GLUCOMTR-MCNC: 322 MG/DL — HIGH (ref 70–99)
GLUCOSE SERPL-MCNC: 218 MG/DL — HIGH (ref 70–99)
HCT VFR BLD CALC: 36.6 % — SIGNIFICANT CHANGE UP (ref 34.5–45)
HGB BLD-MCNC: 11 G/DL — LOW (ref 11.5–15.5)
IANC: 9.07 K/UL — HIGH (ref 1.8–7.4)
IMM GRANULOCYTES NFR BLD AUTO: 1.2 % — HIGH (ref 0–0.9)
INR BLD: 1.38 RATIO — HIGH (ref 0.88–1.16)
LYMPHOCYTES # BLD AUTO: 0.27 K/UL — LOW (ref 1–3.3)
LYMPHOCYTES # BLD AUTO: 2.6 % — LOW (ref 13–44)
MAGNESIUM SERPL-MCNC: 2.4 MG/DL — SIGNIFICANT CHANGE UP (ref 1.6–2.6)
MCHC RBC-ENTMCNC: 30.1 GM/DL — LOW (ref 32–36)
MCHC RBC-ENTMCNC: 30.1 PG — SIGNIFICANT CHANGE UP (ref 27–34)
MCV RBC AUTO: 100 FL — SIGNIFICANT CHANGE UP (ref 80–100)
MONOCYTES # BLD AUTO: 0.76 K/UL — SIGNIFICANT CHANGE UP (ref 0–0.9)
MONOCYTES NFR BLD AUTO: 7.4 % — SIGNIFICANT CHANGE UP (ref 2–14)
NEUTROPHILS # BLD AUTO: 9.07 K/UL — HIGH (ref 1.8–7.4)
NEUTROPHILS NFR BLD AUTO: 88.7 % — HIGH (ref 43–77)
NRBC # BLD: 0 /100 WBCS — SIGNIFICANT CHANGE UP (ref 0–0)
NRBC # FLD: 0 K/UL — SIGNIFICANT CHANGE UP (ref 0–0)
PHOSPHATE SERPL-MCNC: 4.2 MG/DL — SIGNIFICANT CHANGE UP (ref 2.5–4.5)
PLATELET # BLD AUTO: 291 K/UL — SIGNIFICANT CHANGE UP (ref 150–400)
POTASSIUM SERPL-MCNC: 5 MMOL/L — SIGNIFICANT CHANGE UP (ref 3.5–5.3)
POTASSIUM SERPL-SCNC: 5 MMOL/L — SIGNIFICANT CHANGE UP (ref 3.5–5.3)
PROTHROM AB SERPL-ACNC: 16.1 SEC — HIGH (ref 10.5–13.4)
RBC # BLD: 3.66 M/UL — LOW (ref 3.8–5.2)
RBC # FLD: 14.1 % — SIGNIFICANT CHANGE UP (ref 10.3–14.5)
SODIUM SERPL-SCNC: 136 MMOL/L — SIGNIFICANT CHANGE UP (ref 135–145)
TROPONIN T, HIGH SENSITIVITY RESULT: 34 NG/L — SIGNIFICANT CHANGE UP
WBC # BLD: 10.23 K/UL — SIGNIFICANT CHANGE UP (ref 3.8–10.5)
WBC # FLD AUTO: 10.23 K/UL — SIGNIFICANT CHANGE UP (ref 3.8–10.5)

## 2023-01-15 PROCEDURE — 93010 ELECTROCARDIOGRAM REPORT: CPT

## 2023-01-15 RX ORDER — ACETAMINOPHEN 500 MG
1000 TABLET ORAL ONCE
Refills: 0 | Status: COMPLETED | OUTPATIENT
Start: 2023-01-15 | End: 2023-01-16

## 2023-01-15 RX ORDER — INSULIN GLARGINE 100 [IU]/ML
25 INJECTION, SOLUTION SUBCUTANEOUS AT BEDTIME
Refills: 0 | Status: DISCONTINUED | OUTPATIENT
Start: 2023-01-15 | End: 2023-01-24

## 2023-01-15 RX ORDER — WARFARIN SODIUM 2.5 MG/1
10 TABLET ORAL ONCE
Refills: 0 | Status: COMPLETED | OUTPATIENT
Start: 2023-01-15 | End: 2023-01-15

## 2023-01-15 RX ORDER — INSULIN LISPRO 100/ML
10 VIAL (ML) SUBCUTANEOUS
Refills: 0 | Status: DISCONTINUED | OUTPATIENT
Start: 2023-01-15 | End: 2023-01-24

## 2023-01-15 RX ADMIN — AMIODARONE HYDROCHLORIDE 200 MILLIGRAM(S): 400 TABLET ORAL at 05:09

## 2023-01-15 RX ADMIN — LIDOCAINE 1 PATCH: 4 CREAM TOPICAL at 19:00

## 2023-01-15 RX ADMIN — Medication 3 MILLILITER(S): at 03:36

## 2023-01-15 RX ADMIN — Medication 8: at 13:11

## 2023-01-15 RX ADMIN — Medication 3 MILLILITER(S): at 20:18

## 2023-01-15 RX ADMIN — Medication 40 MILLIGRAM(S): at 21:33

## 2023-01-15 RX ADMIN — Medication 4: at 17:55

## 2023-01-15 RX ADMIN — INSULIN GLARGINE 25 UNIT(S): 100 INJECTION, SOLUTION SUBCUTANEOUS at 21:32

## 2023-01-15 RX ADMIN — Medication 8 UNIT(S): at 09:07

## 2023-01-15 RX ADMIN — BUDESONIDE AND FORMOTEROL FUMARATE DIHYDRATE 2 PUFF(S): 160; 4.5 AEROSOL RESPIRATORY (INHALATION) at 18:00

## 2023-01-15 RX ADMIN — WARFARIN SODIUM 10 MILLIGRAM(S): 2.5 TABLET ORAL at 21:42

## 2023-01-15 RX ADMIN — LIDOCAINE 1 PATCH: 4 CREAM TOPICAL at 13:13

## 2023-01-15 RX ADMIN — Medication 325 MILLIGRAM(S): at 13:16

## 2023-01-15 RX ADMIN — LIDOCAINE 1 PATCH: 4 CREAM TOPICAL at 13:14

## 2023-01-15 RX ADMIN — Medication 40 MILLIGRAM(S): at 13:35

## 2023-01-15 RX ADMIN — Medication 40 MILLIGRAM(S): at 05:09

## 2023-01-15 RX ADMIN — SIMVASTATIN 10 MILLIGRAM(S): 20 TABLET, FILM COATED ORAL at 21:33

## 2023-01-15 RX ADMIN — Medication 8 UNIT(S): at 13:11

## 2023-01-15 RX ADMIN — Medication 3 MILLILITER(S): at 15:09

## 2023-01-15 RX ADMIN — HEPARIN SODIUM 1100 UNIT(S)/HR: 5000 INJECTION INTRAVENOUS; SUBCUTANEOUS at 19:32

## 2023-01-15 RX ADMIN — LIDOCAINE 1 PATCH: 4 CREAM TOPICAL at 19:01

## 2023-01-15 RX ADMIN — PANTOPRAZOLE SODIUM 40 MILLIGRAM(S): 20 TABLET, DELAYED RELEASE ORAL at 05:09

## 2023-01-15 RX ADMIN — HEPARIN SODIUM 1300 UNIT(S)/HR: 5000 INJECTION INTRAVENOUS; SUBCUTANEOUS at 09:12

## 2023-01-15 RX ADMIN — HEPARIN SODIUM 1100 UNIT(S)/HR: 5000 INJECTION INTRAVENOUS; SUBCUTANEOUS at 18:48

## 2023-01-15 RX ADMIN — BUMETANIDE 2 MILLIGRAM(S): 0.25 INJECTION INTRAMUSCULAR; INTRAVENOUS at 05:10

## 2023-01-15 RX ADMIN — ISOSORBIDE MONONITRATE 30 MILLIGRAM(S): 60 TABLET, EXTENDED RELEASE ORAL at 13:16

## 2023-01-15 RX ADMIN — HEPARIN SODIUM 1300 UNIT(S)/HR: 5000 INJECTION INTRAVENOUS; SUBCUTANEOUS at 07:55

## 2023-01-15 RX ADMIN — BUMETANIDE 2 MILLIGRAM(S): 0.25 INJECTION INTRAMUSCULAR; INTRAVENOUS at 13:35

## 2023-01-15 RX ADMIN — BUDESONIDE AND FORMOTEROL FUMARATE DIHYDRATE 2 PUFF(S): 160; 4.5 AEROSOL RESPIRATORY (INHALATION) at 21:33

## 2023-01-15 RX ADMIN — Medication 2: at 09:08

## 2023-01-15 RX ADMIN — Medication 10 UNIT(S): at 17:56

## 2023-01-15 NOTE — PROGRESS NOTE ADULT - SUBJECTIVE AND OBJECTIVE BOX
SUBJECTIVE / OVERNIGHT EVENTS:pt seen and examined  01-15-23     MEDICATIONS  (STANDING):  albuterol/ipratropium for Nebulization 3 milliLiter(s) Nebulizer every 6 hours  aMIOdarone    Tablet 200 milliGRAM(s) Oral daily  budesonide 160 MICROgram(s)/formoterol 4.5 MICROgram(s) Inhaler 2 Puff(s) Inhalation two times a day  buMETAnide Injectable 2 milliGRAM(s) IV Push two times a day  dextrose 5%. 1000 milliLiter(s) (50 mL/Hr) IV Continuous <Continuous>  dextrose 5%. 1000 milliLiter(s) (100 mL/Hr) IV Continuous <Continuous>  dextrose 50% Injectable 25 Gram(s) IV Push once  dextrose 50% Injectable 12.5 Gram(s) IV Push once  dextrose 50% Injectable 25 Gram(s) IV Push once  ferrous    sulfate 325 milliGRAM(s) Oral daily  glucagon  Injectable 1 milliGRAM(s) IntraMuscular once  heparin  Infusion.  Unit(s)/Hr (19 mL/Hr) IV Continuous <Continuous>  insulin glargine Injectable (LANTUS) 25 Unit(s) SubCutaneous at bedtime  insulin lispro (ADMELOG) corrective regimen sliding scale   SubCutaneous three times a day before meals  insulin lispro (ADMELOG) corrective regimen sliding scale   SubCutaneous at bedtime  insulin lispro Injectable (ADMELOG) 10 Unit(s) SubCutaneous three times a day before meals  isosorbide   mononitrate ER Tablet (IMDUR) 30 milliGRAM(s) Oral daily  lidocaine   4% Patch 1 Patch Transdermal daily  lidocaine   4% Patch 1 Patch Transdermal daily  methylPREDNISolone sodium succinate Injectable 40 milliGRAM(s) IV Push every 8 hours  pantoprazole    Tablet 40 milliGRAM(s) Oral before breakfast  simvastatin 10 milliGRAM(s) Oral at bedtime    MEDICATIONS  (PRN):  acetaminophen     Tablet .. 650 milliGRAM(s) Oral every 6 hours PRN Temp greater or equal to 38C (100.4F), Mild Pain (1 - 3), Moderate Pain (4 - 6)  acetaminophen   IVPB .. 1000 milliGRAM(s) IV Intermittent once PRN Severe Pain (7 - 10)  aluminum hydroxide/magnesium hydroxide/simethicone Suspension 30 milliLiter(s) Oral every 4 hours PRN Dyspepsia  dextrose Oral Gel 15 Gram(s) Oral once PRN Blood Glucose LESS THAN 70 milliGRAM(s)/deciliter  heparin   Injectable 9000 Unit(s) IV Push every 6 hours PRN For aPTT less than 40  heparin   Injectable 4000 Unit(s) IV Push every 6 hours PRN For aPTT between 40 - 57  sodium chloride 0.65% Nasal 1 Spray(s) Both Nostrils three times a day PRN Nasal Congestion    Vital Signs Last 24 Hrs  T(C): 36.6 (01-15-23 @ 21:32), Max: 36.9 (01-15-23 @ 12:51)  T(F): 97.9 (01-15-23 @ 21:32), Max: 98.4 (01-15-23 @ 12:51)  HR: 65 (01-15-23 @ 21:32) (54 - 81)  BP: 132/67 (01-15-23 @ 21:32) (132/67 - 157/89)  BP(mean): --  RR: 18 (01-15-23 @ 21:32) (18 - 18)  SpO2: 93% (01-15-23 @ 21:32) (92% - 98%)        Constitutional: No fever, fatigue  Skin: No rash.  Eyes: No recent vision problems or eye pain.  ENT: No congestion, ear pain, or sore throat.  Cardiovascular: No chest pain or palpation.  Respiratory: No cough, shortness of breath, congestion, or wheezing.  Gastrointestinal: No abdominal pain, nausea, vomiting, or diarrhea.  Genitourinary: No dysuria.  Musculoskeletal: No joint swelling.  Neurologic: No headache.    PHYSICAL EXAM:  GENERAL: NAD  EYES: EOMI, PERRLA  NECK: Supple, No JVD  CHEST/LUNG: dec breath sounds at bases  HEART:  S1 , S2 +  ABDOMEN: soft, bs+  EXTREMITIES:  edema+  NEUROLOGY:alert awake oriented       LABS:  01-15    136  |  94<L>  |  110<H>  ----------------------------<  218<H>  5.0   |  29  |  1.86<H>    Ca    10.1      15 Ruddy 2023 17:30  Phos  4.2     01-15  Mg     2.40     01-15      Creatinine Trend: 1.86 <--, 1.78 <--, 1.81 <--, 1.56 <--, 1.65 <--                        11.0   10.23 )-----------( 291      ( 15 Ruddy 2023 17:30 )             36.6     Urine Studies:      CARDIAC MARKERS ( 15 Ruddy 2023 17:30 )  x     / x     / 35 U/L / x     / 3.4 ng/mL          PT/INR - ( 15 Ruddy 2023 17:30 )   PT: 16.1 sec;   INR: 1.38 ratio         PTT - ( 15 Ruddy 2023 17:30 )  PTT:99.8 sec        RADIOLOGY & ADDITIONAL TESTS:    Imaging Personally Reviewed:yes    Consultant(s) Notes Reviewed:  yes    Care Discussed with Consultants/Other Providers:yes

## 2023-01-16 LAB
ANION GAP SERPL CALC-SCNC: 12 MMOL/L — SIGNIFICANT CHANGE UP (ref 7–14)
APTT BLD: 136.7 SEC — CRITICAL HIGH (ref 27–36.3)
APTT BLD: 137.6 SEC — CRITICAL HIGH (ref 27–36.3)
APTT BLD: 139.3 SEC — CRITICAL HIGH (ref 27–36.3)
APTT BLD: 43.4 SEC — HIGH (ref 27–36.3)
APTT BLD: 72.5 SEC — HIGH (ref 27–36.3)
BASOPHILS # BLD AUTO: 0.01 K/UL — SIGNIFICANT CHANGE UP (ref 0–0.2)
BASOPHILS NFR BLD AUTO: 0.1 % — SIGNIFICANT CHANGE UP (ref 0–2)
BUN SERPL-MCNC: 112 MG/DL — HIGH (ref 7–23)
CALCIUM SERPL-MCNC: 9.9 MG/DL — SIGNIFICANT CHANGE UP (ref 8.4–10.5)
CHLORIDE SERPL-SCNC: 95 MMOL/L — LOW (ref 98–107)
CO2 SERPL-SCNC: 30 MMOL/L — SIGNIFICANT CHANGE UP (ref 22–31)
CREAT SERPL-MCNC: 2.03 MG/DL — HIGH (ref 0.5–1.3)
EGFR: 25 ML/MIN/1.73M2 — LOW
EOSINOPHIL # BLD AUTO: 0 K/UL — SIGNIFICANT CHANGE UP (ref 0–0.5)
EOSINOPHIL NFR BLD AUTO: 0 % — SIGNIFICANT CHANGE UP (ref 0–6)
GLUCOSE BLDC GLUCOMTR-MCNC: 226 MG/DL — HIGH (ref 70–99)
GLUCOSE BLDC GLUCOMTR-MCNC: 235 MG/DL — HIGH (ref 70–99)
GLUCOSE BLDC GLUCOMTR-MCNC: 282 MG/DL — HIGH (ref 70–99)
GLUCOSE BLDC GLUCOMTR-MCNC: 295 MG/DL — HIGH (ref 70–99)
GLUCOSE SERPL-MCNC: 252 MG/DL — HIGH (ref 70–99)
HCT VFR BLD CALC: 37.1 % — SIGNIFICANT CHANGE UP (ref 34.5–45)
HGB BLD-MCNC: 10.9 G/DL — LOW (ref 11.5–15.5)
IANC: 8.74 K/UL — HIGH (ref 1.8–7.4)
IMM GRANULOCYTES NFR BLD AUTO: 1 % — HIGH (ref 0–0.9)
INR BLD: 1.73 RATIO — HIGH (ref 0.88–1.16)
LYMPHOCYTES # BLD AUTO: 0.2 K/UL — LOW (ref 1–3.3)
LYMPHOCYTES # BLD AUTO: 2.1 % — LOW (ref 13–44)
MAGNESIUM SERPL-MCNC: 2.4 MG/DL — SIGNIFICANT CHANGE UP (ref 1.6–2.6)
MCHC RBC-ENTMCNC: 29.4 GM/DL — LOW (ref 32–36)
MCHC RBC-ENTMCNC: 29.9 PG — SIGNIFICANT CHANGE UP (ref 27–34)
MCV RBC AUTO: 101.9 FL — HIGH (ref 80–100)
MONOCYTES # BLD AUTO: 0.59 K/UL — SIGNIFICANT CHANGE UP (ref 0–0.9)
MONOCYTES NFR BLD AUTO: 6.1 % — SIGNIFICANT CHANGE UP (ref 2–14)
NEUTROPHILS # BLD AUTO: 8.74 K/UL — HIGH (ref 1.8–7.4)
NEUTROPHILS NFR BLD AUTO: 90.7 % — HIGH (ref 43–77)
NRBC # BLD: 0 /100 WBCS — SIGNIFICANT CHANGE UP (ref 0–0)
NRBC # FLD: 0 K/UL — SIGNIFICANT CHANGE UP (ref 0–0)
PHOSPHATE SERPL-MCNC: 4.7 MG/DL — HIGH (ref 2.5–4.5)
PLATELET # BLD AUTO: 262 K/UL — SIGNIFICANT CHANGE UP (ref 150–400)
POTASSIUM SERPL-MCNC: 5 MMOL/L — SIGNIFICANT CHANGE UP (ref 3.5–5.3)
POTASSIUM SERPL-SCNC: 5 MMOL/L — SIGNIFICANT CHANGE UP (ref 3.5–5.3)
PROTHROM AB SERPL-ACNC: 20.2 SEC — HIGH (ref 10.5–13.4)
RBC # BLD: 3.64 M/UL — LOW (ref 3.8–5.2)
RBC # FLD: 14.2 % — SIGNIFICANT CHANGE UP (ref 10.3–14.5)
SODIUM SERPL-SCNC: 137 MMOL/L — SIGNIFICANT CHANGE UP (ref 135–145)
WBC # BLD: 9.64 K/UL — SIGNIFICANT CHANGE UP (ref 3.8–10.5)
WBC # FLD AUTO: 9.64 K/UL — SIGNIFICANT CHANGE UP (ref 3.8–10.5)

## 2023-01-16 PROCEDURE — 99232 SBSQ HOSP IP/OBS MODERATE 35: CPT

## 2023-01-16 RX ORDER — WARFARIN SODIUM 2.5 MG/1
10 TABLET ORAL ONCE
Refills: 0 | Status: COMPLETED | OUTPATIENT
Start: 2023-01-16 | End: 2023-01-16

## 2023-01-16 RX ADMIN — HEPARIN SODIUM 0 UNIT(S)/HR: 5000 INJECTION INTRAVENOUS; SUBCUTANEOUS at 08:58

## 2023-01-16 RX ADMIN — LIDOCAINE 1 PATCH: 4 CREAM TOPICAL at 23:00

## 2023-01-16 RX ADMIN — ISOSORBIDE MONONITRATE 30 MILLIGRAM(S): 60 TABLET, EXTENDED RELEASE ORAL at 11:50

## 2023-01-16 RX ADMIN — INSULIN GLARGINE 25 UNIT(S): 100 INJECTION, SOLUTION SUBCUTANEOUS at 21:55

## 2023-01-16 RX ADMIN — Medication 10 UNIT(S): at 18:38

## 2023-01-16 RX ADMIN — Medication 4: at 09:04

## 2023-01-16 RX ADMIN — LIDOCAINE 1 PATCH: 4 CREAM TOPICAL at 01:13

## 2023-01-16 RX ADMIN — HEPARIN SODIUM 4000 UNIT(S): 5000 INJECTION INTRAVENOUS; SUBCUTANEOUS at 16:49

## 2023-01-16 RX ADMIN — HEPARIN SODIUM 0 UNIT(S)/HR: 5000 INJECTION INTRAVENOUS; SUBCUTANEOUS at 01:06

## 2023-01-16 RX ADMIN — Medication 10 UNIT(S): at 09:04

## 2023-01-16 RX ADMIN — WARFARIN SODIUM 10 MILLIGRAM(S): 2.5 TABLET ORAL at 21:59

## 2023-01-16 RX ADMIN — LIDOCAINE 1 PATCH: 4 CREAM TOPICAL at 19:00

## 2023-01-16 RX ADMIN — HEPARIN SODIUM 500 UNIT(S)/HR: 5000 INJECTION INTRAVENOUS; SUBCUTANEOUS at 10:06

## 2023-01-16 RX ADMIN — LIDOCAINE 1 PATCH: 4 CREAM TOPICAL at 11:51

## 2023-01-16 RX ADMIN — HEPARIN SODIUM 800 UNIT(S)/HR: 5000 INJECTION INTRAVENOUS; SUBCUTANEOUS at 02:12

## 2023-01-16 RX ADMIN — HEPARIN SODIUM 700 UNIT(S)/HR: 5000 INJECTION INTRAVENOUS; SUBCUTANEOUS at 16:48

## 2023-01-16 RX ADMIN — Medication 3 MILLILITER(S): at 23:37

## 2023-01-16 RX ADMIN — Medication 40 MILLIGRAM(S): at 21:57

## 2023-01-16 RX ADMIN — HEPARIN SODIUM 800 UNIT(S)/HR: 5000 INJECTION INTRAVENOUS; SUBCUTANEOUS at 07:42

## 2023-01-16 RX ADMIN — BUDESONIDE AND FORMOTEROL FUMARATE DIHYDRATE 2 PUFF(S): 160; 4.5 AEROSOL RESPIRATORY (INHALATION) at 22:02

## 2023-01-16 RX ADMIN — HEPARIN SODIUM 700 UNIT(S)/HR: 5000 INJECTION INTRAVENOUS; SUBCUTANEOUS at 19:27

## 2023-01-16 RX ADMIN — Medication 6: at 18:39

## 2023-01-16 RX ADMIN — Medication 3 MILLILITER(S): at 03:39

## 2023-01-16 RX ADMIN — Medication 10 UNIT(S): at 13:16

## 2023-01-16 RX ADMIN — LIDOCAINE 1 PATCH: 4 CREAM TOPICAL at 01:14

## 2023-01-16 RX ADMIN — Medication 3 MILLILITER(S): at 08:35

## 2023-01-16 RX ADMIN — SIMVASTATIN 10 MILLIGRAM(S): 20 TABLET, FILM COATED ORAL at 21:57

## 2023-01-16 RX ADMIN — AMIODARONE HYDROCHLORIDE 200 MILLIGRAM(S): 400 TABLET ORAL at 06:39

## 2023-01-16 RX ADMIN — Medication 325 MILLIGRAM(S): at 11:50

## 2023-01-16 RX ADMIN — Medication 650 MILLIGRAM(S): at 12:49

## 2023-01-16 RX ADMIN — Medication 40 MILLIGRAM(S): at 14:34

## 2023-01-16 RX ADMIN — Medication 40 MILLIGRAM(S): at 06:38

## 2023-01-16 RX ADMIN — LIDOCAINE 1 PATCH: 4 CREAM TOPICAL at 11:50

## 2023-01-16 RX ADMIN — Medication 1000 MILLIGRAM(S): at 22:25

## 2023-01-16 RX ADMIN — Medication 3 MILLILITER(S): at 15:42

## 2023-01-16 RX ADMIN — Medication 6: at 13:16

## 2023-01-16 RX ADMIN — Medication 650 MILLIGRAM(S): at 11:49

## 2023-01-16 RX ADMIN — BUMETANIDE 2 MILLIGRAM(S): 0.25 INJECTION INTRAMUSCULAR; INTRAVENOUS at 14:30

## 2023-01-16 RX ADMIN — BUDESONIDE AND FORMOTEROL FUMARATE DIHYDRATE 2 PUFF(S): 160; 4.5 AEROSOL RESPIRATORY (INHALATION) at 10:07

## 2023-01-16 RX ADMIN — BUMETANIDE 2 MILLIGRAM(S): 0.25 INJECTION INTRAMUSCULAR; INTRAVENOUS at 06:38

## 2023-01-16 RX ADMIN — Medication 400 MILLIGRAM(S): at 21:55

## 2023-01-16 RX ADMIN — PANTOPRAZOLE SODIUM 40 MILLIGRAM(S): 20 TABLET, DELAYED RELEASE ORAL at 06:39

## 2023-01-16 NOTE — CONSULT NOTE ADULT - TIME BILLING
- Review of records, telemetry, vital signs and daily labs.   - General and cardiovascular physical examination.  - Generation of cardiovascular treatment plan.  - Coordination of care.      Patient was seen and examined by me on 1/16/23interim events noted,labs and radiology studies reviewed.  Edwar Merida MD,FACC.  [address] [place], [place] [id number].  [phone]

## 2023-01-16 NOTE — CONSULT NOTE ADULT - ASSESSMENT
72F with h/o COPD, pHTN, mech MVR on coumadin, Afib/flutter, MIGUEL presented with worsening SOB from baseline. On admission concern for COPD exacerbation. CXR is concerning for CHF. admit for management.      Problem/Plan - 1:  ·  Problem: COPD exacerbation.   ·  Plan: s/p solumedrol, duoneb in ED with improvement.   no wheezing on lung exam.   currently mentating abel.   c/w solumedrol. duoneb rtc.   c/w home symbicort  provide O2 vis humidified air. might need this at home to prevent nose bleed at home.     Problem/Plan - 2:  ·  Problem: Diastolic CHF, acute on chronic.   ·  Plan: elevated proBNP, b/l LE edema, +orthopnea.   patient is at baseline O2 req, but symptomatic clinically.   on Bumex 2mg PO bid. will start Bumex 4mg IV BID  strict I&O, daily weight. admit to tele  recent echo was poor study due to body habitus.   monitor clinical response to diuretics.  c/w isosorbide and statin.     Problem/Plan - 3:  ·  Problem: H/O mitral valve replacement with mechanical valve.   ·  Plan: has not taken warfarin in the past 3 days due to nose bleed.   recent ELIZABETH reviewed, no issues with valve opening.   check INR and PTT. if INR< 2.5 will need to start hep gtt.  patient reports warfarin 10mg qd at home.     Problem/Plan - 4:  ·  Problem: Afib.   ·  Plan: h/o Afib and Aflutter s/p ablation and recent DCCV.   c/w Amiodarone.   c/w warfarin for AC.     Problem/Plan - 5:  ·  Problem: Type 2 diabetes mellitus.   ·  Plan: on home Levemir and novolog  start CC diet.   start patient on lantus 20U qhs, Adamelog 8U tid.     Problem/Plan - 6:  ·  Problem: MIGUEL treated with BiPAP.   ·  Plan: c/w home nocturnal BiPAP.

## 2023-01-16 NOTE — PROGRESS NOTE ADULT - SUBJECTIVE AND OBJECTIVE BOX
PULMONARY PROGRESS NOTE    DAMARI GUERRERO  MRN-[id number]    Patient is a 72y old  Female who presents with a chief complaint of SOB (15 Ruddy 2023 12:27)      HPI:  -  -    ROS:   -    ACTIVE MEDICATION LIST:  MEDICATIONS  (STANDING):  albuterol/ipratropium for Nebulization 3 milliLiter(s) Nebulizer every 6 hours  aMIOdarone    Tablet 200 milliGRAM(s) Oral daily  budesonide 160 MICROgram(s)/formoterol 4.5 MICROgram(s) Inhaler 2 Puff(s) Inhalation two times a day  buMETAnide Injectable 2 milliGRAM(s) IV Push two times a day  dextrose 5%. 1000 milliLiter(s) (50 mL/Hr) IV Continuous <Continuous>  dextrose 5%. 1000 milliLiter(s) (100 mL/Hr) IV Continuous <Continuous>  dextrose 50% Injectable 25 Gram(s) IV Push once  dextrose 50% Injectable 12.5 Gram(s) IV Push once  dextrose 50% Injectable 25 Gram(s) IV Push once  ferrous    sulfate 325 milliGRAM(s) Oral daily  glucagon  Injectable 1 milliGRAM(s) IntraMuscular once  heparin  Infusion.  Unit(s)/Hr (19 mL/Hr) IV Continuous <Continuous>  insulin glargine Injectable (LANTUS) 25 Unit(s) SubCutaneous at bedtime  insulin lispro (ADMELOG) corrective regimen sliding scale   SubCutaneous three times a day before meals  insulin lispro (ADMELOG) corrective regimen sliding scale   SubCutaneous at bedtime  insulin lispro Injectable (ADMELOG) 10 Unit(s) SubCutaneous three times a day before meals  isosorbide   mononitrate ER Tablet (IMDUR) 30 milliGRAM(s) Oral daily  lidocaine   4% Patch 1 Patch Transdermal daily  lidocaine   4% Patch 1 Patch Transdermal daily  methylPREDNISolone sodium succinate Injectable 40 milliGRAM(s) IV Push every 8 hours  pantoprazole    Tablet 40 milliGRAM(s) Oral before breakfast  simvastatin 10 milliGRAM(s) Oral at bedtime    MEDICATIONS  (PRN):  acetaminophen     Tablet .. 650 milliGRAM(s) Oral every 6 hours PRN Temp greater or equal to 38C (100.4F), Mild Pain (1 - 3), Moderate Pain (4 - 6)  acetaminophen   IVPB .. 1000 milliGRAM(s) IV Intermittent once PRN Severe Pain (7 - 10)  aluminum hydroxide/magnesium hydroxide/simethicone Suspension 30 milliLiter(s) Oral every 4 hours PRN Dyspepsia  dextrose Oral Gel 15 Gram(s) Oral once PRN Blood Glucose LESS THAN 70 milliGRAM(s)/deciliter  heparin   Injectable 9000 Unit(s) IV Push every 6 hours PRN For aPTT less than 40  heparin   Injectable 4000 Unit(s) IV Push every 6 hours PRN For aPTT between 40 - 57  sodium chloride 0.65% Nasal 1 Spray(s) Both Nostrils three times a day PRN Nasal Congestion      EXAM:  Vital Signs Last 24 Hrs  T(C): 36.7 (16 Jan 2023 11:50), Max: 36.9 (15 Ruddy 2023 12:51)  T(F): 98 (16 Jan 2023 11:50), Max: 98.4 (15 Ruddy 2023 12:51)  HR: 64 (16 Jan 2023 11:50) (60 - 86)  BP: 158/68 (16 Jan 2023 11:50) (132/67 - 158/68)  BP(mean): --  RR: 18 (16 Jan 2023 11:50) (18 - 18)  SpO2: 95% (16 Jan 2023 11:50) (92% - 98%)    Parameters below as of 16 Jan 2023 11:50  Patient On (Oxygen Delivery Method): nasal cannula  O2 Flow (L/min): 2      GENERAL: The patient is awake and alert in no apparent distress.     LUNGS: Clear to auscultation without wheezing, rales or rhonchi; respirations unlabored    HEART: Regular rate and rhythm without murmur.                            10.9   9.64  )-----------( 262      ( 16 Jan 2023 08:04 )             37.1       01-16    137  |  95<L>  |  112<H>  ----------------------------<  252<H>  5.0   |  30  |  2.03<H>    Ca    9.9      16 Jan 2023 08:04  Phos  4.7     01-16  Mg     2.40     01-16      >>> <<<    PROBLEM LIST:  72y Female with HEALTH ISSUES - PROBLEM Dx:  COPD exacerbation    Diastolic CHF, acute on chronic    H/O mitral valve replacement with mechanical valve    MIGUEL treated with BiPAP    Prophylactic measure    Type 2 diabetes mellitus    Afib              RECS:        Please call with any questions.    Irma Eaton DO  [facility] Pulmonary/Sleep Medicine  [phone]   PULMONARY PROGRESS NOTE    DAMARI GUERRERO  MRN-[id number]    Patient is a 72y old  Female who presents with a chief complaint of SOB (15 Ruddy 2023 12:27)      HPI:  -known to this service  -morbid obesity, CAD,  HFpEF (EF 65-60%), severe MR and TR s/p mechanical MV and TV repair (on coumadin), Afib s/p failed ablation, HTN, HLD, HTN, MIGUEL, COPD (on 3L O2),hypercarbic resp failure reportedly with trilogy machine  has not been seen outpatient  no known pulm  on chronic prednisone  admitted for cet tightness  seen on 3L  did not get NIVV since admission      ROS:   -    ACTIVE MEDICATION LIST:  MEDICATIONS  (STANDING):  albuterol/ipratropium for Nebulization 3 milliLiter(s) Nebulizer every 6 hours  aMIOdarone    Tablet 200 milliGRAM(s) Oral daily  budesonide 160 MICROgram(s)/formoterol 4.5 MICROgram(s) Inhaler 2 Puff(s) Inhalation two times a day  buMETAnide Injectable 2 milliGRAM(s) IV Push two times a day  dextrose 5%. 1000 milliLiter(s) (50 mL/Hr) IV Continuous <Continuous>  dextrose 5%. 1000 milliLiter(s) (100 mL/Hr) IV Continuous <Continuous>  dextrose 50% Injectable 25 Gram(s) IV Push once  dextrose 50% Injectable 12.5 Gram(s) IV Push once  dextrose 50% Injectable 25 Gram(s) IV Push once  ferrous    sulfate 325 milliGRAM(s) Oral daily  glucagon  Injectable 1 milliGRAM(s) IntraMuscular once  heparin  Infusion.  Unit(s)/Hr (19 mL/Hr) IV Continuous <Continuous>  insulin glargine Injectable (LANTUS) 25 Unit(s) SubCutaneous at bedtime  insulin lispro (ADMELOG) corrective regimen sliding scale   SubCutaneous three times a day before meals  insulin lispro (ADMELOG) corrective regimen sliding scale   SubCutaneous at bedtime  insulin lispro Injectable (ADMELOG) 10 Unit(s) SubCutaneous three times a day before meals  isosorbide   mononitrate ER Tablet (IMDUR) 30 milliGRAM(s) Oral daily  lidocaine   4% Patch 1 Patch Transdermal daily  lidocaine   4% Patch 1 Patch Transdermal daily  methylPREDNISolone sodium succinate Injectable 40 milliGRAM(s) IV Push every 8 hours  pantoprazole    Tablet 40 milliGRAM(s) Oral before breakfast  simvastatin 10 milliGRAM(s) Oral at bedtime    MEDICATIONS  (PRN):  acetaminophen     Tablet .. 650 milliGRAM(s) Oral every 6 hours PRN Temp greater or equal to 38C (100.4F), Mild Pain (1 - 3), Moderate Pain (4 - 6)  acetaminophen   IVPB .. 1000 milliGRAM(s) IV Intermittent once PRN Severe Pain (7 - 10)  aluminum hydroxide/magnesium hydroxide/simethicone Suspension 30 milliLiter(s) Oral every 4 hours PRN Dyspepsia  dextrose Oral Gel 15 Gram(s) Oral once PRN Blood Glucose LESS THAN 70 milliGRAM(s)/deciliter  heparin   Injectable 9000 Unit(s) IV Push every 6 hours PRN For aPTT less than 40  heparin   Injectable 4000 Unit(s) IV Push every 6 hours PRN For aPTT between 40 - 57  sodium chloride 0.65% Nasal 1 Spray(s) Both Nostrils three times a day PRN Nasal Congestion      EXAM:  Vital Signs Last 24 Hrs  T(C): 36.7 (16 Jan 2023 11:50), Max: 36.9 (15 Ruddy 2023 12:51)  T(F): 98 (16 Jan 2023 11:50), Max: 98.4 (15 Ruddy 2023 12:51)  HR: 64 (16 Jan 2023 11:50) (60 - 86)  BP: 158/68 (16 Jan 2023 11:50) (132/67 - 158/68)  BP(mean): --  RR: 18 (16 Jan 2023 11:50) (18 - 18)  SpO2: 95% (16 Jan 2023 11:50) (92% - 98%)    Parameters below as of 16 Jan 2023 11:50  Patient On (Oxygen Delivery Method): nasal cannula  O2 Flow (L/min): 2      GENERAL: The patient is awake and alert in no apparent distress.     LUNGS:  not labored. diminished breath sounds    HEART: Regular rate                              10.9   9.64  )-----------( 262      ( 16 Jan 2023 08:04 )             37.1       01-16    137  |  95<L>  |  112<H>  ----------------------------<  252<H>  5.0   |  30  |  2.03<H>    Ca    9.9      16 Jan 2023 08:04  Phos  4.7     01-16  Mg     2.40     01-16     < from: Xray Chest 1 View- PORTABLE-Urgent (Xray Chest 1 View- PORTABLE-Urgent .) (01.12.23 @ 23:31) >    ACC: [id number] EXAM:  XR CHEST PORTABLE URGENT 1V                          PROCEDURE DATE:  01/12/2023          INTERPRETATION:  CLINICAL INDICATION: Shortness of breath. History of CHF   and COPD.    EXAM: Frontal radiograph of the chest.    COMPARISON: Chest radiograph from 10/23/22    FINDINGS:  Median sternotomy and valve replacement.  Indistinctiveness of pulmonary vasculature without focal consolidation.  There is no pleural effusion or pneumothorax.  Cardiomegaly.  The visualized osseous structures demonstrate no acute pathology.    IMPRESSION:  Cardiomegaly with pulmonary edema.    --- End of Report ---          AC FLORES MD; Resident Radiology  This document has been electronically signed.  DELIA PEDERSEN MD; Attending Radiologist  This document has been electronically signed. Jan 13 2023  6:53AM    < end of copied text >  < from: CT Abdomen and Pelvis No Cont (10.22.22 @ 22:38) >    ACC: [id number] EXAM:  CT ABDOMEN AND PELVIS                          PROCEDURE DATE:  10/22/2022          INTERPRETATION:  CLINICAL INFORMATION: Vomiting    COMPARISON: CT abdomen pelvis 4/12/2021    CONTRAST/COMPLICATIONS:  IV Contrast:  Oral Contrast:  Complications:    PROCEDURE:  CT of the Abdomen and Pelvis was performed.  Sagittal and coronal reformats were performed.    FINDINGS: Images are degraded by respiratory motion artifact.    LOWER CHEST: Cardiomegaly. Replaced mitral valve. Median sternotomy.   Bilateral lower lobe septal thickening and groundglass opacities likely   mild edema.    LIVER: Within normal limits.  BILE DUCTS: Normal caliber.  GALLBLADDER: Cholelithiasis.  SPLEEN: Within normal limits.  PANCREAS: Within normal limits.  ADRENALS: Within normal limits.  KIDNEYS/URETERS: Within normal limits.    BLADDER: Minimally distended.  REPRODUCTIVE ORGANS: Uterus and adnexa within normal limits.    BOWEL: Sigmoid diverticulosis. No bowel obstruction. Appendix is normal.  PERITONEUM: No ascites.  VESSELS: Atherosclerotic changes.  RETROPERITONEUM/LYMPH NODES: No lymphadenopathy.  ABDOMINAL WALL: Within normal limits.  BONES: Degenerative changes of the spine with grade 1 anterolisthesis of   L4 on L5 and L5 on S1.    IMPRESSION: Motion degraded exam.  No acute abdominal pathology.    Mild bibasilar pulmonary edema.    --- End of Report ---            GINGER HESS MD; Attending Radiologist  This document has been electronically signed. Oct 22 2022 11:39PM    < end of copied text >      PROBLEM LIST:  72y Female with HEALTH ISSUES - PROBLEM Dx:  COPD exacerbation    Diastolic CHF, acute on chronic    H/O mitral valve replacement with mechanical valve    MIGUEL treated with BiPAP    Prophylactic measure    Type 2 diabetes mellitus    Afib              RECS:  02  needs to be on AVAPS when sleeping ( previous settings in the chart)  bronchodilators  solumedrol 40 IVP TID noted for 3 days now  will start solumedrol 30 mg IVP TID tomorrow  cardio eval/ renal eval  continue PPI  start h2 block  low threshold for noncontrast CT chest           Please call with any questions.    Irma Eaton DO  [facility] Pulmonary/Sleep Medicine  [phone]   PULMONARY PROGRESS NOTE    DAMARI GUERRERO  MRN-[id number]    Patient is a 72y old  Female who presents with a chief complaint of SOB (15 Ruddy 2023 12:27)      HPI:  -known to this service  -morbid obesity, CAD,  HFpEF (EF 65-60%), severe MR and TR s/p mechanical MV and TV repair (on coumadin), Afib s/p failed ablation, HTN, HLD, HTN, MIGUEL, COPD (on 3L O2),hypercarbic resp failure reportedly with trilogy machine  has not been seen outpatient  no known pulm  on chronic prednisone  admitted for cet tightness  seen on 3L  did not get NIVV since admission      ROS:   -    ACTIVE MEDICATION LIST:  MEDICATIONS  (STANDING):  albuterol/ipratropium for Nebulization 3 milliLiter(s) Nebulizer every 6 hours  aMIOdarone    Tablet 200 milliGRAM(s) Oral daily  budesonide 160 MICROgram(s)/formoterol 4.5 MICROgram(s) Inhaler 2 Puff(s) Inhalation two times a day  buMETAnide Injectable 2 milliGRAM(s) IV Push two times a day  dextrose 5%. 1000 milliLiter(s) (50 mL/Hr) IV Continuous <Continuous>  dextrose 5%. 1000 milliLiter(s) (100 mL/Hr) IV Continuous <Continuous>  dextrose 50% Injectable 25 Gram(s) IV Push once  dextrose 50% Injectable 12.5 Gram(s) IV Push once  dextrose 50% Injectable 25 Gram(s) IV Push once  ferrous    sulfate 325 milliGRAM(s) Oral daily  glucagon  Injectable 1 milliGRAM(s) IntraMuscular once  heparin  Infusion.  Unit(s)/Hr (19 mL/Hr) IV Continuous <Continuous>  insulin glargine Injectable (LANTUS) 25 Unit(s) SubCutaneous at bedtime  insulin lispro (ADMELOG) corrective regimen sliding scale   SubCutaneous three times a day before meals  insulin lispro (ADMELOG) corrective regimen sliding scale   SubCutaneous at bedtime  insulin lispro Injectable (ADMELOG) 10 Unit(s) SubCutaneous three times a day before meals  isosorbide   mononitrate ER Tablet (IMDUR) 30 milliGRAM(s) Oral daily  lidocaine   4% Patch 1 Patch Transdermal daily  lidocaine   4% Patch 1 Patch Transdermal daily  methylPREDNISolone sodium succinate Injectable 40 milliGRAM(s) IV Push every 8 hours  pantoprazole    Tablet 40 milliGRAM(s) Oral before breakfast  simvastatin 10 milliGRAM(s) Oral at bedtime    MEDICATIONS  (PRN):  acetaminophen     Tablet .. 650 milliGRAM(s) Oral every 6 hours PRN Temp greater or equal to 38C (100.4F), Mild Pain (1 - 3), Moderate Pain (4 - 6)  acetaminophen   IVPB .. 1000 milliGRAM(s) IV Intermittent once PRN Severe Pain (7 - 10)  aluminum hydroxide/magnesium hydroxide/simethicone Suspension 30 milliLiter(s) Oral every 4 hours PRN Dyspepsia  dextrose Oral Gel 15 Gram(s) Oral once PRN Blood Glucose LESS THAN 70 milliGRAM(s)/deciliter  heparin   Injectable 9000 Unit(s) IV Push every 6 hours PRN For aPTT less than 40  heparin   Injectable 4000 Unit(s) IV Push every 6 hours PRN For aPTT between 40 - 57  sodium chloride 0.65% Nasal 1 Spray(s) Both Nostrils three times a day PRN Nasal Congestion      EXAM:  Vital Signs Last 24 Hrs  T(C): 36.7 (16 Jan 2023 11:50), Max: 36.9 (15 Ruddy 2023 12:51)  T(F): 98 (16 Jan 2023 11:50), Max: 98.4 (15 Ruddy 2023 12:51)  HR: 64 (16 Jan 2023 11:50) (60 - 86)  BP: 158/68 (16 Jan 2023 11:50) (132/67 - 158/68)  BP(mean): --  RR: 18 (16 Jan 2023 11:50) (18 - 18)  SpO2: 95% (16 Jan 2023 11:50) (92% - 98%)    Parameters below as of 16 Jan 2023 11:50  Patient On (Oxygen Delivery Method): nasal cannula  O2 Flow (L/min): 2      GENERAL: The patient is awake and alert in no apparent distress.     LUNGS:  not labored. diminished breath sounds    HEART: Regular rate                              10.9   9.64  )-----------( 262      ( 16 Jan 2023 08:04 )             37.1       01-16    137  |  95<L>  |  112<H>  ----------------------------<  252<H>  5.0   |  30  |  2.03<H>    Ca    9.9      16 Jan 2023 08:04  Phos  4.7     01-16  Mg     2.40     01-16     < from: Xray Chest 1 View- PORTABLE-Urgent (Xray Chest 1 View- PORTABLE-Urgent .) (01.12.23 @ 23:31) >    ACC: [id number] EXAM:  XR CHEST PORTABLE URGENT 1V                          PROCEDURE DATE:  01/12/2023          INTERPRETATION:  CLINICAL INDICATION: Shortness of breath. History of CHF   and COPD.    EXAM: Frontal radiograph of the chest.    COMPARISON: Chest radiograph from 10/23/22    FINDINGS:  Median sternotomy and valve replacement.  Indistinctiveness of pulmonary vasculature without focal consolidation.  There is no pleural effusion or pneumothorax.  Cardiomegaly.  The visualized osseous structures demonstrate no acute pathology.    IMPRESSION:  Cardiomegaly with pulmonary edema.    --- End of Report ---          AC FLORES MD; Resident Radiology  This document has been electronically signed.  DELIA PEDERSEN MD; Attending Radiologist  This document has been electronically signed. Jan 13 2023  6:53AM    < end of copied text >  < from: CT Abdomen and Pelvis No Cont (10.22.22 @ 22:38) >    ACC: [id number] EXAM:  CT ABDOMEN AND PELVIS                          PROCEDURE DATE:  10/22/2022          INTERPRETATION:  CLINICAL INFORMATION: Vomiting    COMPARISON: CT abdomen pelvis 4/12/2021    CONTRAST/COMPLICATIONS:  IV Contrast:  Oral Contrast:  Complications:    PROCEDURE:  CT of the Abdomen and Pelvis was performed.  Sagittal and coronal reformats were performed.    FINDINGS: Images are degraded by respiratory motion artifact.    LOWER CHEST: Cardiomegaly. Replaced mitral valve. Median sternotomy.   Bilateral lower lobe septal thickening and groundglass opacities likely   mild edema.    LIVER: Within normal limits.  BILE DUCTS: Normal caliber.  GALLBLADDER: Cholelithiasis.  SPLEEN: Within normal limits.  PANCREAS: Within normal limits.  ADRENALS: Within normal limits.  KIDNEYS/URETERS: Within normal limits.    BLADDER: Minimally distended.  REPRODUCTIVE ORGANS: Uterus and adnexa within normal limits.    BOWEL: Sigmoid diverticulosis. No bowel obstruction. Appendix is normal.  PERITONEUM: No ascites.  VESSELS: Atherosclerotic changes.  RETROPERITONEUM/LYMPH NODES: No lymphadenopathy.  ABDOMINAL WALL: Within normal limits.  BONES: Degenerative changes of the spine with grade 1 anterolisthesis of   L4 on L5 and L5 on S1.    IMPRESSION: Motion degraded exam.  No acute abdominal pathology.    Mild bibasilar pulmonary edema.    --- End of Report ---            GINGER HESS MD; Attending Radiologist  This document has been electronically signed. Oct 22 2022 11:39PM    < end of copied text >      PROBLEM LIST:  72y Female with HEALTH ISSUES - PROBLEM Dx:  COPD exacerbation    Diastolic CHF, acute on chronic    H/O mitral valve replacement with mechanical valve    MIGUEL treated with BiPAP    Prophylactic measure    Type 2 diabetes mellitus    Afib              RECS:  she was subtheraputic on coumadin when she came - chest pain now- PE? Rule out?  continue 02  needs to be on BPAP when sleeping ( previous settings in the chart)  bronchodilators  solumedrol 40 IVP TID noted for 3 days now  start solumedrol 30 mg IVP TID tomorrow  cardio eval/ renal eval  continue PPI  start h2 block      Please call with any questions.    Irma Eaton, DO  [facility]  [phone]

## 2023-01-16 NOTE — PROGRESS NOTE ADULT - SUBJECTIVE AND OBJECTIVE BOX
SUBJECTIVE / OVERNIGHT EVENTS:pt seen and examined  01-16-23     MEDICATIONS  (STANDING):  albuterol/ipratropium for Nebulization 3 milliLiter(s) Nebulizer every 6 hours  aMIOdarone    Tablet 200 milliGRAM(s) Oral daily  budesonide 160 MICROgram(s)/formoterol 4.5 MICROgram(s) Inhaler 2 Puff(s) Inhalation two times a day  buMETAnide Injectable 2 milliGRAM(s) IV Push two times a day  dextrose 5%. 1000 milliLiter(s) (50 mL/Hr) IV Continuous <Continuous>  dextrose 5%. 1000 milliLiter(s) (100 mL/Hr) IV Continuous <Continuous>  dextrose 50% Injectable 25 Gram(s) IV Push once  dextrose 50% Injectable 12.5 Gram(s) IV Push once  dextrose 50% Injectable 25 Gram(s) IV Push once  ferrous    sulfate 325 milliGRAM(s) Oral daily  glucagon  Injectable 1 milliGRAM(s) IntraMuscular once  heparin  Infusion.  Unit(s)/Hr (19 mL/Hr) IV Continuous <Continuous>  insulin glargine Injectable (LANTUS) 25 Unit(s) SubCutaneous at bedtime  insulin lispro (ADMELOG) corrective regimen sliding scale   SubCutaneous three times a day before meals  insulin lispro (ADMELOG) corrective regimen sliding scale   SubCutaneous at bedtime  insulin lispro Injectable (ADMELOG) 10 Unit(s) SubCutaneous three times a day before meals  isosorbide   mononitrate ER Tablet (IMDUR) 30 milliGRAM(s) Oral daily  lidocaine   4% Patch 1 Patch Transdermal daily  lidocaine   4% Patch 1 Patch Transdermal daily  pantoprazole    Tablet 40 milliGRAM(s) Oral before breakfast  simvastatin 10 milliGRAM(s) Oral at bedtime    MEDICATIONS  (PRN):  acetaminophen     Tablet .. 650 milliGRAM(s) Oral every 6 hours PRN Temp greater or equal to 38C (100.4F), Mild Pain (1 - 3), Moderate Pain (4 - 6)  aluminum hydroxide/magnesium hydroxide/simethicone Suspension 30 milliLiter(s) Oral every 4 hours PRN Dyspepsia  dextrose Oral Gel 15 Gram(s) Oral once PRN Blood Glucose LESS THAN 70 milliGRAM(s)/deciliter  heparin   Injectable 9000 Unit(s) IV Push every 6 hours PRN For aPTT less than 40  heparin   Injectable 4000 Unit(s) IV Push every 6 hours PRN For aPTT between 40 - 57  sodium chloride 0.65% Nasal 1 Spray(s) Both Nostrils three times a day PRN Nasal Congestion    Vital Signs Last 24 Hrs  T(C): 36.8 (01-16-23 @ 18:16), Max: 36.8 (01-16-23 @ 18:16)  T(F): 98.3 (01-16-23 @ 18:16), Max: 98.3 (01-16-23 @ 18:16)  HR: 71 (01-16-23 @ 18:16) (60 - 88)  BP: 158/60 (01-16-23 @ 18:16) (151/62 - 158/68)  BP(mean): --  RR: 18 (01-16-23 @ 18:16) (18 - 18)  SpO2: 95% (01-16-23 @ 18:16) (95% - 97%)        Constitutional: No fever, fatigue  Skin: No rash.  Eyes: No recent vision problems or eye pain.  ENT: No congestion, ear pain, or sore throat.  Cardiovascular: No chest pain or palpation.  Respiratory: No cough, shortness of breath, congestion, or wheezing.  Gastrointestinal: No abdominal pain, nausea, vomiting, or diarrhea.  Genitourinary: No dysuria.  Musculoskeletal: No joint swelling.  Neurologic: No headache.    PHYSICAL EXAM:  GENERAL: NAD  EYES: EOMI, PERRLA  NECK: Supple, No JVD  CHEST/LUNG: dec breath sounds at bases  HEART:  S1 , S2 +  ABDOMEN: soft, bs+  EXTREMITIES:  edema+  NEUROLOGY:alert awake oriented     LABS:  01-16    137  |  95<L>  |  112<H>  ----------------------------<  252<H>  5.0   |  30  |  2.03<H>    Ca    9.9      16 Jan 2023 08:04  Phos  4.7     01-16  Mg     2.40     01-16      Creatinine Trend: 2.03 <--, 1.86 <--, 1.78 <--, 1.81 <--, 1.56 <--, 1.65 <--                        10.9   9.64  )-----------( 262      ( 16 Jan 2023 08:04 )             37.1     Urine Studies:      CARDIAC MARKERS ( 15 Ruddy 2023 17:30 )  x     / x     / 35 U/L / x     / 3.4 ng/mL          PT/INR - ( 16 Jan 2023 08:04 )   PT: 20.2 sec;   INR: 1.73 ratio         PTT - ( 16 Jan 2023 16:07 )  PTT:43.4 sec  c        RADIOLOGY & ADDITIONAL TESTS:    Imaging Personally Reviewed:yes    Consultant(s) Notes Reviewed:  yes    Care Discussed with Consultants/Other Providers:yes

## 2023-01-16 NOTE — CONSULT NOTE ADULT - SUBJECTIVE AND OBJECTIVE BOX
PATIENT SEEN AND EXAMINED ON :- 1/16/23  DATE OF SERVICE:      1/16/23       Interim events noted,Labs ,Radiological studies and Cardiology tests reviewed .    DATE OF SERVICE: 01-16-23 @ 21:20  Patient was seen and examined on    01-16-23 @ 21:20 .Interim events noted.Consultant notes ,Labs,Telemetry reviewed by me       HOSPITAL COURSE: HPI:  72F with PMH COPD/MIGUEL/OHS (home BiPAP/ 3L NC), pHTN, MVR on Coumadin AF s/p ablation and recent DCCV on 11/4/22 pw SOB. Patient reports she has been feeling progressive SOB over the past 1 week, associated with orthopniea and intermittent CP. Per patient CP has been chronic and no change in intensity. She received treatment and steroids in ED which help with her symptoms. denied fever, chill, coughing, abd pain, worsening LE edema or diarrhea.   of note, patient endorsed she has not taking warfarin for the past 3 days due to nose bleed.  (13 Jan 2023 11:48)      INTERIM EVENTS:Patient seen at bedside ,interim events noted.      PMH -reviewed admission note, no change since admission  HEART FAILURE: Acute[ ]Chronic[ ] Systolic[ ] Diastolic[ ] Combined Systolic and Diastolic[ ]  CAD[ ] CABG[ ] PCI[ ]  DEVICES[ ] PPM[ ] ICD[ ] ILR[ ]  ATRIAL FIBRILLATION[ ] Paroxysmal[ ] Permanent[ ] CHADS2-[  ]  MISAEL[ ] CKD1[ ] CKD2[ ] CKD3[ ] CKD4[ ] ESRD[ ]  COPD[ ] HTN[ ]   DM[ ] Type1[ ] Type 2[ ]   CVA[ ] Paresis[ ]    AMBULATION: Assisted[ ] Cane/walker[ ] Independent[ ]    MEDICATIONS  (STANDING):  albuterol/ipratropium for Nebulization 3 milliLiter(s) Nebulizer every 6 hours  aMIOdarone    Tablet 200 milliGRAM(s) Oral daily  budesonide 160 MICROgram(s)/formoterol 4.5 MICROgram(s) Inhaler 2 Puff(s) Inhalation two times a day  buMETAnide Injectable 2 milliGRAM(s) IV Push two times a day  dextrose 5%. 1000 milliLiter(s) (50 mL/Hr) IV Continuous <Continuous>  dextrose 5%. 1000 milliLiter(s) (100 mL/Hr) IV Continuous <Continuous>  dextrose 50% Injectable 25 Gram(s) IV Push once  dextrose 50% Injectable 12.5 Gram(s) IV Push once  dextrose 50% Injectable 25 Gram(s) IV Push once  ferrous    sulfate 325 milliGRAM(s) Oral daily  glucagon  Injectable 1 milliGRAM(s) IntraMuscular once  heparin  Infusion.  Unit(s)/Hr (19 mL/Hr) IV Continuous <Continuous>  insulin glargine Injectable (LANTUS) 25 Unit(s) SubCutaneous at bedtime  insulin lispro (ADMELOG) corrective regimen sliding scale   SubCutaneous three times a day before meals  insulin lispro (ADMELOG) corrective regimen sliding scale   SubCutaneous at bedtime  insulin lispro Injectable (ADMELOG) 10 Unit(s) SubCutaneous three times a day before meals  isosorbide   mononitrate ER Tablet (IMDUR) 30 milliGRAM(s) Oral daily  lidocaine   4% Patch 1 Patch Transdermal daily  lidocaine   4% Patch 1 Patch Transdermal daily  methylPREDNISolone sodium succinate Injectable 40 milliGRAM(s) IV Push every 8 hours  pantoprazole    Tablet 40 milliGRAM(s) Oral before breakfast  simvastatin 10 milliGRAM(s) Oral at bedtime  warfarin 10 milliGRAM(s) Oral once    MEDICATIONS  (PRN):  acetaminophen     Tablet .. 650 milliGRAM(s) Oral every 6 hours PRN Temp greater or equal to 38C (100.4F), Mild Pain (1 - 3), Moderate Pain (4 - 6)  acetaminophen   IVPB .. 1000 milliGRAM(s) IV Intermittent once PRN Severe Pain (7 - 10)  aluminum hydroxide/magnesium hydroxide/simethicone Suspension 30 milliLiter(s) Oral every 4 hours PRN Dyspepsia  dextrose Oral Gel 15 Gram(s) Oral once PRN Blood Glucose LESS THAN 70 milliGRAM(s)/deciliter  heparin   Injectable 9000 Unit(s) IV Push every 6 hours PRN For aPTT less than 40  heparin   Injectable 4000 Unit(s) IV Push every 6 hours PRN For aPTT between 40 - 57  sodium chloride 0.65% Nasal 1 Spray(s) Both Nostrils three times a day PRN Nasal Congestion            REVIEW OF SYSTEMS:  Constitutional: [ ] fever, [ ]weight loss,  [ ]fatigue [ ]weight gain  Eyes: [ ] visual changes  Respiratory: [ ]shortness of breath;  [ ] cough, [ ]wheezing, [ ]chills, [ ]hemoptysis  Cardiovascular: [ ] chest pain, [ ]palpitations, [ ]dizziness,  [ ]leg swelling[ ]orthopnea[ ]PND  Gastrointestinal: [ ] abdominal pain, [ ]nausea, [ ]vomiting,  [ ]diarrhea [ ]Constipation [ ]Melena  Genitourinary: [ ] dysuria, [ ] hematuria [ ]Junior  Neurologic: [ ] headaches [ ] tremors[ ]weakness [ ]Paralysis Right[ ] Left[ ]  Skin: [ ] itching, [ ]burning, [ ] rashes  Endocrine: [ ] heat or cold intolerance  Musculoskeletal: [ ] joint pain or swelling; [ ] muscle, back, or extremity pain  Psychiatric: [ ] depression, [ ]anxiety, [ ]mood swings, or [ ]difficulty sleeping  Hematologic: [ ] easy bruising, [ ] bleeding gums    [ ] All remaining systems negative except as per above.   [ ]Unable to obtain.  [x] No change in ROS since admission      Vital Signs Last 24 Hrs  T(C): 36.8 (16 Jan 2023 18:16), Max: 36.8 (16 Jan 2023 18:16)  T(F): 98.3 (16 Jan 2023 18:16), Max: 98.3 (16 Jan 2023 18:16)  HR: 71 (16 Jan 2023 18:16) (60 - 88)  BP: 158/60 (16 Jan 2023 18:16) (132/67 - 158/68)  BP(mean): --  RR: 18 (16 Jan 2023 18:16) (18 - 18)  SpO2: 95% (16 Jan 2023 18:16) (93% - 97%)    Parameters below as of 16 Jan 2023 18:16  Patient On (Oxygen Delivery Method): nasal cannula  O2 Flow (L/min): 2    I&O's Summary    15 Ruddy 2023 07:01  -  16 Jan 2023 07:00  --------------------------------------------------------  IN: 95 mL / OUT: 700 mL / NET: -605 mL        PHYSICAL EXAM:  General: No acute distress BMI-  HEENT: EOMI, PERRL  Neck: Supple, [ ] JVD  Lungs: Equal air entry bilaterally; [ ] rales [ ] wheezing [ ] rhonchi  Heart: Regular rate and rhythm; [x ] murmur   2/6 [ x] systolic [ ] diastolic [ ] radiation[ ] rubs [ ]  gallops  Abdomen: Nontender, bowel sounds present  Extremities: No clubbing, cyanosis, [ ] edema [ ]Pulses  equal and intact  Nervous system:  Alert & Oriented X3, no focal deficits  Psychiatric: Normal affect  Skin: No rashes or lesions    LABS:  01-16    137  |  95<L>  |  112<H>  ----------------------------<  252<H>  5.0   |  30  |  2.03<H>    Ca    9.9      16 Jan 2023 08:04  Phos  4.7     01-16  Mg     2.40     01-16      Creatinine Trend: 2.03<--, 1.86<--, 1.78<--, 1.81<--, 1.56<--, 1.65<--                        10.9   9.64  )-----------( 262      ( 16 Jan 2023 08:04 )             37.1     PT/INR - ( 16 Jan 2023 08:04 )   PT: 20.2 sec;   INR: 1.73 ratio         PTT - ( 16 Jan 2023 16:07 )  PTT:43.4 sec

## 2023-01-17 LAB
ANION GAP SERPL CALC-SCNC: 8 MMOL/L — SIGNIFICANT CHANGE UP (ref 7–14)
APPEARANCE UR: CLEAR — SIGNIFICANT CHANGE UP
APTT BLD: 51 SEC — HIGH (ref 27–36.3)
APTT BLD: 79 SEC — HIGH (ref 27–36.3)
APTT BLD: 94.4 SEC — HIGH (ref 27–36.3)
BACTERIA # UR AUTO: NEGATIVE — SIGNIFICANT CHANGE UP
BILIRUB UR-MCNC: NEGATIVE — SIGNIFICANT CHANGE UP
BUN SERPL-MCNC: 115 MG/DL — HIGH (ref 7–23)
CALCIUM SERPL-MCNC: 9.9 MG/DL — SIGNIFICANT CHANGE UP (ref 8.4–10.5)
CHLORIDE SERPL-SCNC: 96 MMOL/L — LOW (ref 98–107)
CO2 SERPL-SCNC: 34 MMOL/L — HIGH (ref 22–31)
COLOR SPEC: COLORLESS — SIGNIFICANT CHANGE UP
CREAT ?TM UR-MCNC: 17 MG/DL — SIGNIFICANT CHANGE UP
CREAT SERPL-MCNC: 2.18 MG/DL — HIGH (ref 0.5–1.3)
DIFF PNL FLD: NEGATIVE — SIGNIFICANT CHANGE UP
EGFR: 23 ML/MIN/1.73M2 — LOW
GLUCOSE BLDC GLUCOMTR-MCNC: 238 MG/DL — HIGH (ref 70–99)
GLUCOSE BLDC GLUCOMTR-MCNC: 249 MG/DL — HIGH (ref 70–99)
GLUCOSE BLDC GLUCOMTR-MCNC: 262 MG/DL — HIGH (ref 70–99)
GLUCOSE BLDC GLUCOMTR-MCNC: 332 MG/DL — HIGH (ref 70–99)
GLUCOSE SERPL-MCNC: 262 MG/DL — HIGH (ref 70–99)
GLUCOSE UR QL: NEGATIVE — SIGNIFICANT CHANGE UP
HCT VFR BLD CALC: 36.2 % — SIGNIFICANT CHANGE UP (ref 34.5–45)
HGB BLD-MCNC: 10.6 G/DL — LOW (ref 11.5–15.5)
INR BLD: 2.31 RATIO — HIGH (ref 0.88–1.16)
KETONES UR-MCNC: NEGATIVE — SIGNIFICANT CHANGE UP
LEUKOCYTE ESTERASE UR-ACNC: NEGATIVE — SIGNIFICANT CHANGE UP
MAGNESIUM SERPL-MCNC: 2.5 MG/DL — SIGNIFICANT CHANGE UP (ref 1.6–2.6)
MCHC RBC-ENTMCNC: 29.3 GM/DL — LOW (ref 32–36)
MCHC RBC-ENTMCNC: 30 PG — SIGNIFICANT CHANGE UP (ref 27–34)
MCV RBC AUTO: 102.5 FL — HIGH (ref 80–100)
NITRITE UR-MCNC: NEGATIVE — SIGNIFICANT CHANGE UP
NRBC # BLD: 0 /100 WBCS — SIGNIFICANT CHANGE UP (ref 0–0)
NRBC # FLD: 0 K/UL — SIGNIFICANT CHANGE UP (ref 0–0)
PH UR: 6 — SIGNIFICANT CHANGE UP (ref 5–8)
PHOSPHATE SERPL-MCNC: 4.2 MG/DL — SIGNIFICANT CHANGE UP (ref 2.5–4.5)
PLATELET # BLD AUTO: 225 K/UL — SIGNIFICANT CHANGE UP (ref 150–400)
POTASSIUM SERPL-MCNC: 5.1 MMOL/L — SIGNIFICANT CHANGE UP (ref 3.5–5.3)
POTASSIUM SERPL-SCNC: 5.1 MMOL/L — SIGNIFICANT CHANGE UP (ref 3.5–5.3)
PROT UR-MCNC: NEGATIVE — SIGNIFICANT CHANGE UP
PROTHROM AB SERPL-ACNC: 27 SEC — HIGH (ref 10.5–13.4)
RBC # BLD: 3.53 M/UL — LOW (ref 3.8–5.2)
RBC # FLD: 14.3 % — SIGNIFICANT CHANGE UP (ref 10.3–14.5)
RBC CASTS # UR COMP ASSIST: 1 /HPF — SIGNIFICANT CHANGE UP (ref 0–4)
SODIUM SERPL-SCNC: 138 MMOL/L — SIGNIFICANT CHANGE UP (ref 135–145)
SP GR SPEC: 1.01 — SIGNIFICANT CHANGE UP (ref 1.01–1.05)
UROBILINOGEN FLD QL: SIGNIFICANT CHANGE UP
UUN UR-MCNC: 361 MG/DL — SIGNIFICANT CHANGE UP
WBC # BLD: 8.23 K/UL — SIGNIFICANT CHANGE UP (ref 3.8–10.5)
WBC # FLD AUTO: 8.23 K/UL — SIGNIFICANT CHANGE UP (ref 3.8–10.5)
WBC UR QL: 1 /HPF — SIGNIFICANT CHANGE UP (ref 0–5)

## 2023-01-17 PROCEDURE — 99233 SBSQ HOSP IP/OBS HIGH 50: CPT

## 2023-01-17 PROCEDURE — 71045 X-RAY EXAM CHEST 1 VIEW: CPT | Mod: 26

## 2023-01-17 RX ORDER — HYDRALAZINE HCL 50 MG
25 TABLET ORAL THREE TIMES A DAY
Refills: 0 | Status: DISCONTINUED | OUTPATIENT
Start: 2023-01-17 | End: 2023-01-18

## 2023-01-17 RX ORDER — BUMETANIDE 0.25 MG/ML
1 INJECTION INTRAMUSCULAR; INTRAVENOUS
Qty: 20 | Refills: 0 | Status: DISCONTINUED | OUTPATIENT
Start: 2023-01-17 | End: 2023-01-19

## 2023-01-17 RX ORDER — BUMETANIDE 0.25 MG/ML
2 INJECTION INTRAMUSCULAR; INTRAVENOUS ONCE
Refills: 0 | Status: COMPLETED | OUTPATIENT
Start: 2023-01-17 | End: 2023-01-17

## 2023-01-17 RX ORDER — ACETAMINOPHEN 500 MG
1000 TABLET ORAL ONCE
Refills: 0 | Status: COMPLETED | OUTPATIENT
Start: 2023-01-17 | End: 2023-01-17

## 2023-01-17 RX ADMIN — Medication 3 MILLILITER(S): at 10:14

## 2023-01-17 RX ADMIN — Medication 30 MILLIGRAM(S): at 14:54

## 2023-01-17 RX ADMIN — Medication 325 MILLIGRAM(S): at 11:34

## 2023-01-17 RX ADMIN — HEPARIN SODIUM 900 UNIT(S)/HR: 5000 INJECTION INTRAVENOUS; SUBCUTANEOUS at 07:48

## 2023-01-17 RX ADMIN — LIDOCAINE 1 PATCH: 4 CREAM TOPICAL at 11:35

## 2023-01-17 RX ADMIN — Medication 10 UNIT(S): at 18:24

## 2023-01-17 RX ADMIN — Medication 3 MILLILITER(S): at 20:25

## 2023-01-17 RX ADMIN — HEPARIN SODIUM 700 UNIT(S)/HR: 5000 INJECTION INTRAVENOUS; SUBCUTANEOUS at 00:46

## 2023-01-17 RX ADMIN — ISOSORBIDE MONONITRATE 30 MILLIGRAM(S): 60 TABLET, EXTENDED RELEASE ORAL at 11:31

## 2023-01-17 RX ADMIN — BUDESONIDE AND FORMOTEROL FUMARATE DIHYDRATE 2 PUFF(S): 160; 4.5 AEROSOL RESPIRATORY (INHALATION) at 21:25

## 2023-01-17 RX ADMIN — Medication 3 MILLILITER(S): at 14:44

## 2023-01-17 RX ADMIN — SIMVASTATIN 10 MILLIGRAM(S): 20 TABLET, FILM COATED ORAL at 21:24

## 2023-01-17 RX ADMIN — AMIODARONE HYDROCHLORIDE 200 MILLIGRAM(S): 400 TABLET ORAL at 06:35

## 2023-01-17 RX ADMIN — Medication 25 MILLIGRAM(S): at 14:52

## 2023-01-17 RX ADMIN — Medication 6: at 18:25

## 2023-01-17 RX ADMIN — Medication 3 MILLILITER(S): at 04:33

## 2023-01-17 RX ADMIN — Medication 650 MILLIGRAM(S): at 14:53

## 2023-01-17 RX ADMIN — HEPARIN SODIUM 4000 UNIT(S): 5000 INJECTION INTRAVENOUS; SUBCUTANEOUS at 07:49

## 2023-01-17 RX ADMIN — HEPARIN SODIUM 900 UNIT(S)/HR: 5000 INJECTION INTRAVENOUS; SUBCUTANEOUS at 21:47

## 2023-01-17 RX ADMIN — PANTOPRAZOLE SODIUM 40 MILLIGRAM(S): 20 TABLET, DELAYED RELEASE ORAL at 06:36

## 2023-01-17 RX ADMIN — Medication 8: at 12:54

## 2023-01-17 RX ADMIN — Medication 25 MILLIGRAM(S): at 21:25

## 2023-01-17 RX ADMIN — LIDOCAINE 1 PATCH: 4 CREAM TOPICAL at 11:34

## 2023-01-17 RX ADMIN — Medication 10 UNIT(S): at 12:53

## 2023-01-17 RX ADMIN — Medication 30 MILLIGRAM(S): at 06:35

## 2023-01-17 RX ADMIN — Medication 400 MILLIGRAM(S): at 21:06

## 2023-01-17 RX ADMIN — HEPARIN SODIUM 900 UNIT(S)/HR: 5000 INJECTION INTRAVENOUS; SUBCUTANEOUS at 19:35

## 2023-01-17 RX ADMIN — Medication 650 MILLIGRAM(S): at 15:53

## 2023-01-17 RX ADMIN — Medication 4: at 09:25

## 2023-01-17 RX ADMIN — HEPARIN SODIUM 700 UNIT(S)/HR: 5000 INJECTION INTRAVENOUS; SUBCUTANEOUS at 07:28

## 2023-01-17 RX ADMIN — HEPARIN SODIUM 900 UNIT(S)/HR: 5000 INJECTION INTRAVENOUS; SUBCUTANEOUS at 17:28

## 2023-01-17 RX ADMIN — BUDESONIDE AND FORMOTEROL FUMARATE DIHYDRATE 2 PUFF(S): 160; 4.5 AEROSOL RESPIRATORY (INHALATION) at 11:31

## 2023-01-17 RX ADMIN — BUMETANIDE 2 MILLIGRAM(S): 0.25 INJECTION INTRAMUSCULAR; INTRAVENOUS at 06:34

## 2023-01-17 RX ADMIN — HEPARIN SODIUM 900 UNIT(S)/HR: 5000 INJECTION INTRAVENOUS; SUBCUTANEOUS at 14:56

## 2023-01-17 RX ADMIN — BUMETANIDE 5 MG/HR: 0.25 INJECTION INTRAMUSCULAR; INTRAVENOUS at 17:51

## 2023-01-17 RX ADMIN — LIDOCAINE 1 PATCH: 4 CREAM TOPICAL at 23:35

## 2023-01-17 RX ADMIN — INSULIN GLARGINE 25 UNIT(S): 100 INJECTION, SOLUTION SUBCUTANEOUS at 21:26

## 2023-01-17 RX ADMIN — BUMETANIDE 2 MILLIGRAM(S): 0.25 INJECTION INTRAMUSCULAR; INTRAVENOUS at 16:53

## 2023-01-17 RX ADMIN — Medication 10 UNIT(S): at 09:25

## 2023-01-17 RX ADMIN — LIDOCAINE 1 PATCH: 4 CREAM TOPICAL at 23:34

## 2023-01-17 NOTE — PROGRESS NOTE ADULT - SUBJECTIVE AND OBJECTIVE BOX
PATIENT SEEN AND EXAMINED ON :- 1/17/23  DATE OF SERVICE:   1/17/23          Interim events noted,Labs ,Radiological studies and Cardiology tests reviewed .       HOSPITAL COURSE: HPI:  72F with PMH COPD/MIGUEL/OHS (home BiPAP/ 3L NC), pHTN, MVR on Coumadin AF s/p ablation and recent DCCV on 11/4/22 pw SOB. Patient reports she has been feeling progressive SOB over the past 1 week, associated with orthopniea and intermittent CP. Per patient CP has been chronic and no change in intensity. She received treatment and steroids in ED which help with her symptoms. denied fever, chill, coughing, abd pain, worsening LE edema or diarrhea.   of note, patient endorsed she has not taking warfarin for the past 3 days due to nose bleed.  (13 Jan 2023 11:48)      INTERIM EVENTS:Patient seen at bedside ,interim events noted.      PMH -reviewed admission note, no change since admission  HEART FAILURE: Acute[ ]Chronic[ ] Systolic[ ] Diastolic[ ] Combined Systolic and Diastolic[ ]  CAD[ ] CABG[ ] PCI[ ]  DEVICES[ ] PPM[ ] ICD[ ] ILR[ ]  ATRIAL FIBRILLATION[ ] Paroxysmal[ ] Permanent[ ] CHADS2-[  ]  MISAEL[ ] CKD1[ ] CKD2[ ] CKD3[ ] CKD4[ ] ESRD[ ]  COPD[ ] HTN[ ]   DM[ ] Type1[ ] Type 2[ ]   CVA[ ] Paresis[ ]    AMBULATION: Assisted[ ] Cane/walker[ ] Independent[ ]    MEDICATIONS  (STANDING):  albuterol/ipratropium for Nebulization 3 milliLiter(s) Nebulizer every 6 hours  aMIOdarone    Tablet 200 milliGRAM(s) Oral daily  budesonide 160 MICROgram(s)/formoterol 4.5 MICROgram(s) Inhaler 2 Puff(s) Inhalation two times a day  buMETAnide Infusion 1 mG/Hr (5 mL/Hr) IV Continuous <Continuous>  dextrose 5%. 1000 milliLiter(s) (50 mL/Hr) IV Continuous <Continuous>  dextrose 5%. 1000 milliLiter(s) (100 mL/Hr) IV Continuous <Continuous>  dextrose 50% Injectable 25 Gram(s) IV Push once  dextrose 50% Injectable 12.5 Gram(s) IV Push once  dextrose 50% Injectable 25 Gram(s) IV Push once  ferrous    sulfate 325 milliGRAM(s) Oral daily  glucagon  Injectable 1 milliGRAM(s) IntraMuscular once  heparin  Infusion.  Unit(s)/Hr (19 mL/Hr) IV Continuous <Continuous>  hydrALAZINE 25 milliGRAM(s) Oral three times a day  insulin glargine Injectable (LANTUS) 25 Unit(s) SubCutaneous at bedtime  insulin lispro (ADMELOG) corrective regimen sliding scale   SubCutaneous three times a day before meals  insulin lispro (ADMELOG) corrective regimen sliding scale   SubCutaneous at bedtime  insulin lispro Injectable (ADMELOG) 10 Unit(s) SubCutaneous three times a day before meals  isosorbide   mononitrate ER Tablet (IMDUR) 30 milliGRAM(s) Oral daily  lidocaine   4% Patch 1 Patch Transdermal daily  lidocaine   4% Patch 1 Patch Transdermal daily  pantoprazole    Tablet 40 milliGRAM(s) Oral before breakfast  simvastatin 10 milliGRAM(s) Oral at bedtime    MEDICATIONS  (PRN):  acetaminophen     Tablet .. 650 milliGRAM(s) Oral every 6 hours PRN Temp greater or equal to 38C (100.4F), Mild Pain (1 - 3), Moderate Pain (4 - 6)  aluminum hydroxide/magnesium hydroxide/simethicone Suspension 30 milliLiter(s) Oral every 4 hours PRN Dyspepsia  dextrose Oral Gel 15 Gram(s) Oral once PRN Blood Glucose LESS THAN 70 milliGRAM(s)/deciliter  heparin   Injectable 4000 Unit(s) IV Push every 6 hours PRN For aPTT between 40 - 57  heparin   Injectable 9000 Unit(s) IV Push every 6 hours PRN For aPTT less than 40  sodium chloride 0.65% Nasal 1 Spray(s) Both Nostrils three times a day PRN Nasal Congestion            REVIEW OF SYSTEMS:  Constitutional: [ ] fever, [ ]weight loss,  [ ]fatigue [ ]weight gain  Eyes: [ ] visual changes  Respiratory: [ ]shortness of breath;  [ ] cough, [ ]wheezing, [ ]chills, [ ]hemoptysis  Cardiovascular: [ ] chest pain, [ ]palpitations, [ ]dizziness,  [ ]leg swelling[ ]orthopnea[ ]PND  Gastrointestinal: [ ] abdominal pain, [ ]nausea, [ ]vomiting,  [ ]diarrhea [ ]Constipation [ ]Melena  Genitourinary: [ ] dysuria, [ ] hematuria [ ]Junior  Neurologic: [ ] headaches [ ] tremors[ ]weakness [ ]Paralysis Right[ ] Left[ ]  Skin: [ ] itching, [ ]burning, [ ] rashes  Endocrine: [ ] heat or cold intolerance  Musculoskeletal: [ ] joint pain or swelling; [ ] muscle, back, or extremity pain  Psychiatric: [ ] depression, [ ]anxiety, [ ]mood swings, or [ ]difficulty sleeping  Hematologic: [ ] easy bruising, [ ] bleeding gums    [ ] All remaining systems negative except as per above.   [ ]Unable to obtain.  [x] No change in ROS since admission      Vital Signs Last 24 Hrs  T(C): 36.6 (17 Jan 2023 17:25), Max: 36.7 (17 Jan 2023 14:50)  T(F): 97.8 (17 Jan 2023 17:25), Max: 98.1 (17 Jan 2023 14:50)  HR: 80 (17 Jan 2023 20:27) (59 - 85)  BP: 151/78 (17 Jan 2023 17:25) (133/72 - 155/81)  BP(mean): --  RR: 18 (17 Jan 2023 17:25) (16 - 18)  SpO2: 95% (17 Jan 2023 20:27) (95% - 99%)    Parameters below as of 17 Jan 2023 20:27  Patient On (Oxygen Delivery Method): nasal cannula      I&O's Summary    16 Jan 2023 07:01  -  17 Jan 2023 07:00  --------------------------------------------------------  IN: 84 mL / OUT: 900 mL / NET: -816 mL    17 Jan 2023 07:01  -  17 Jan 2023 21:23  --------------------------------------------------------  IN: 175 mL / OUT: 300 mL / NET: -125 mL        PHYSICAL EXAM:  General: No acute distress BMI-  HEENT: EOMI, PERRL  Neck: Supple, [ ] JVD  Lungs: Equal air entry bilaterally; [ ] rales [ ] wheezing [ ] rhonchi  Heart: Regular rate and rhythm; [x ] murmur   2/6 [ x] systolic [ ] diastolic [ ] radiation[ ] rubs [ ]  gallops  Abdomen: Nontender, bowel sounds present  Extremities: No clubbing, cyanosis, [ ] edema [ ]Pulses  equal and intact  Nervous system:  Alert & Oriented X3, no focal deficits  Psychiatric: Normal affect  Skin: No rashes or lesions    LABS:  01-17    138  |  96<L>  |  115<H>  ----------------------------<  262<H>  5.1   |  34<H>  |  2.18<H>    Ca    9.9      17 Jan 2023 06:40  Phos  4.2     01-17  Mg     2.50     01-17      Creatinine Trend: 2.18<--, 2.03<--, 1.86<--, 1.78<--, 1.81<--, 1.56<--                        10.6   8.23  )-----------( 225      ( 17 Jan 2023 06:40 )             36.2     PT/INR - ( 17 Jan 2023 06:40 )   PT: 27.0 sec;   INR: 2.31 ratio         PTT - ( 17 Jan 2023 14:04 )  PTT:79.0 sec

## 2023-01-17 NOTE — PROGRESS NOTE ADULT - SUBJECTIVE AND OBJECTIVE BOX
SUBJECTIVE / OVERNIGHT EVENTS:pt seen and examined  23     MEDICATIONS  (STANDING):  albuterol/ipratropium for Nebulization 3 milliLiter(s) Nebulizer every 6 hours  aMIOdarone    Tablet 200 milliGRAM(s) Oral daily  budesonide 160 MICROgram(s)/formoterol 4.5 MICROgram(s) Inhaler 2 Puff(s) Inhalation two times a day  buMETAnide Infusion 1 mG/Hr (5 mL/Hr) IV Continuous <Continuous>  dextrose 5%. 1000 milliLiter(s) (50 mL/Hr) IV Continuous <Continuous>  dextrose 5%. 1000 milliLiter(s) (100 mL/Hr) IV Continuous <Continuous>  dextrose 50% Injectable 25 Gram(s) IV Push once  dextrose 50% Injectable 12.5 Gram(s) IV Push once  dextrose 50% Injectable 25 Gram(s) IV Push once  ferrous    sulfate 325 milliGRAM(s) Oral daily  glucagon  Injectable 1 milliGRAM(s) IntraMuscular once  heparin  Infusion.  Unit(s)/Hr (19 mL/Hr) IV Continuous <Continuous>  hydrALAZINE 25 milliGRAM(s) Oral three times a day  insulin glargine Injectable (LANTUS) 25 Unit(s) SubCutaneous at bedtime  insulin lispro (ADMELOG) corrective regimen sliding scale   SubCutaneous three times a day before meals  insulin lispro (ADMELOG) corrective regimen sliding scale   SubCutaneous at bedtime  insulin lispro Injectable (ADMELOG) 10 Unit(s) SubCutaneous three times a day before meals  isosorbide   mononitrate ER Tablet (IMDUR) 30 milliGRAM(s) Oral daily  lidocaine   4% Patch 1 Patch Transdermal daily  lidocaine   4% Patch 1 Patch Transdermal daily  pantoprazole    Tablet 40 milliGRAM(s) Oral before breakfast  predniSONE   Tablet 40 milliGRAM(s) Oral daily  simvastatin 10 milliGRAM(s) Oral at bedtime    MEDICATIONS  (PRN):  acetaminophen     Tablet .. 650 milliGRAM(s) Oral every 6 hours PRN Temp greater or equal to 38C (100.4F), Mild Pain (1 - 3), Moderate Pain (4 - 6)  aluminum hydroxide/magnesium hydroxide/simethicone Suspension 30 milliLiter(s) Oral every 4 hours PRN Dyspepsia  dextrose Oral Gel 15 Gram(s) Oral once PRN Blood Glucose LESS THAN 70 milliGRAM(s)/deciliter  heparin   Injectable 9000 Unit(s) IV Push every 6 hours PRN For aPTT less than 40  heparin   Injectable 4000 Unit(s) IV Push every 6 hours PRN For aPTT between 40 - 57  sodium chloride 0.65% Nasal 1 Spray(s) Both Nostrils three times a day PRN Nasal Congestion    Vital Signs Last 24 Hrs  T(C): 36.4 (23 @ 21:15), Max: 36.7 (23 @ 14:50)  T(F): 97.5 (23 @ 21:15), Max: 98.1 (23 @ 14:50)  HR: 71 (23 @ 21:15) (60 - 85)  BP: 146/66 (23 @ 21:15) (146/66 - 155/81)  BP(mean): --  RR: 18 (23 @ 21:15) (16 - 18)  SpO2: 99% (23 @ 21:15) (95% - 99%)      Constitutional: No fever, fatigue  Skin: No rash.  Eyes: No recent vision problems or eye pain.  ENT: No congestion, ear pain, or sore throat.  Cardiovascular: No chest pain or palpation.  Respiratory: No cough, shortness of breath, congestion, or wheezing.  Gastrointestinal: No abdominal pain, nausea, vomiting, or diarrhea.  Genitourinary: No dysuria.  Musculoskeletal: No joint swelling.  Neurologic: No headache.    PHYSICAL EXAM:  GENERAL: NAD  EYES: EOMI, PERRLA  NECK: Supple, No JVD  CHEST/LUNG: dec breath sounds at bases  HEART:  S1 , S2 +  ABDOMEN: soft, bs+  EXTREMITIES:  edema+  NEUROLOGY:alert awake oriented     LABS:      138  |  96<L>  |  115<H>  ----------------------------<  262<H>  5.1   |  34<H>  |  2.18<H>    Ca    9.9      2023 06:40  Phos  4.2       Mg     2.50           Creatinine Trend: 2.18 <--, 2.03 <--, 1.86 <--, 1.78 <--, 1.81 <--, 1.56 <--, 1.65 <--                        10.6   8.23  )-----------( 225      ( 2023 06:40 )             36.2     Urine Studies:  Urinalysis Basic - ( 2023 17:54 )    Color: Colorless / Appearance: Clear / S.011 / pH:   Gluc:  / Ketone: Negative  / Bili: Negative / Urobili: <2 mg/dL   Blood:  / Protein: Negative / Nitrite: Negative   Leuk Esterase: Negative / RBC: 1 /HPF / WBC 1 /HPF   Sq Epi:  / Non Sq Epi:  / Bacteria: Negative      Creatinine, Random Urine: 17 mg/dL ( @ 17:54)            PT/INR - ( 2023 06:40 )   PT: 27.0 sec;   INR: 2.31 ratio         PTT - ( 2023 21:04 )  PTT:94.4 sec        PT/INR - ( 2023 08:04 )   PT: 20.2 sec;   INR: 1.73 ratio         PTT - ( 2023 16:07 )  PTT:43.4 sec  c        RADIOLOGY & ADDITIONAL TESTS:    Imaging Personally Reviewed:yes    Consultant(s) Notes Reviewed:  yes    Care Discussed with Consultants/Other Providers:yes

## 2023-01-17 NOTE — PROGRESS NOTE ADULT - ASSESSMENT
72 year old Female with history of COPD, CHF presents with weakness. Nephrology consulted for elevated Scr.    1) MISAEL: likely hemodynamically mediated in setting of diuresis. Check UA, urine urea and urine creatinine. Check bladder scan. Avoid nephrotoxins.    2) CKD-3b: Baseline Scr 1.6-1.8 thought to be secondary to DM. Outpatient CKD work up. Monitor electrolytes.    3) HTN with CKD: BP uncontrolled. Start Hydralazine 25 mg PO TID. Monitor BP.    4) LE edema: ON bumex 2 mg IV twice daily with inaccurate UO. Please check repeat CXR today. Will consider changing bumex to oral pending lab results. Prior TTE with mild to moderate global LVSD. Monitor UO.      [facility] NEPHROLOGY  Rodrigue Amador M.D.  Rob Perez D.O.  Ana Song M.D.  Lucrecia López, ALLISON, ANP-C    Telephone: [phone]  Facsimile: [phone]    [address] [id number]

## 2023-01-17 NOTE — PROGRESS NOTE ADULT - SUBJECTIVE AND OBJECTIVE BOX
[facility] NEPHROLOGY- CONSULTATION NOTE    72 year old Female with history of below presents with SOB. Nephrology consulted for elevated Scr.    Patient known to our practice for h/o CKD-3b on prior admissions with baseline Scr 1.4-1.6. Patient had been on bumex 2 mg PO twice daily as an outpatient for h/o CHF which was continued on admission intravenously given pulmonary edema on CXR. Scr progressively increasing on diuretics.       REVIEW OF SYSTEMS:  Gen: no fevers  HEENT: no rhinorrhea  Neck: no sore throat  Cards: no chest pain  Resp: + dyspnea improving  GI: no nausea or vomiting or diarrhea  : no dysuria or hematuria  Vascular: + LE edema  Derm: no rashes  Neuro: no numbness/tingling    No Known Allergies      Home Medications Reviewed  Hospital Medications:   MEDICATIONS  (STANDING):  acetaminophen     Tablet .. 975 milliGRAM(s) Oral once  budesonide 160 MICROgram(s)/formoterol 4.5 MICROgram(s) Inhaler 2 Puff(s) Inhalation two times a day  ferrous    sulfate 325 milliGRAM(s) Oral daily  heparin   Injectable 5000 Unit(s) SubCutaneous every 8 hours  hydrALAZINE 25 milliGRAM(s) Oral every 8 hours  lidocaine   4% Patch 1 Patch Transdermal every 24 hours  metoprolol succinate ER 25 milliGRAM(s) Oral daily  pantoprazole    Tablet 40 milliGRAM(s) Oral before breakfast  simvastatin 10 milliGRAM(s) Oral at bedtime      PAST MEDICAL & SURGICAL HISTORY:  Atrial fibrillation  S/P Ablation    Pulmonary hypertension    MIGUEL (obstructive sleep apnea)    COPD (chronic obstructive pulmonary disease)  on home O2    CHF (congestive heart failure)    HTN (hypertension)    Gout    Mitral valve replaced    Tricuspid valve replaced    CHF (congestive heart failure)    Hypertension    COPD (chronic obstructive pulmonary disease)    History of mitral valve replacement with mechanical valve    Tricuspid valve replaced  mechanical    No significant past surgical history        FAMILY HISTORY:  Family history of hypertension (Father, Sibling)    Family history of stroke    Family history of hypertension (Mother)        SOCIAL HISTORY:  Denies toxic substance use       VITALS:  T(F): 97.4 (01-17-23 @ 06:35), Max: 98.3 (01-16-23 @ 18:16)  HR: 60 (01-17-23 @ 06:35)  BP: 152/83 (01-17-23 @ 06:35)  RR: 16 (01-17-23 @ 06:35)  SpO2: 99% (01-17-23 @ 06:35)  Wt(kg): --    01-16 @ 07:01  -  01-17 @ 07:00  --------------------------------------------------------  IN: 84 mL / OUT: 900 mL / NET: -816 mL      PHYSICAL EXAM:  Gen: NAD, calm  HEENT: MMM  Neck: no JVD  Cards: RRR, +S1/S2, no M/G/R  Resp: CTA B/L  GI: soft, NT/ND, NABS  : no CVA tenderness  Vascular: 1+ RLE edema, trace LLE edema  Derm: no rashes  Neuro: non-focal      LABS:  01-17    138  |  96<L>  |  115<H>  ----------------------------<  262<H>  5.1   |  34<H>  |  2.18<H>    Ca    9.9      17 Jan 2023 06:40  Phos  4.2     01-17  Mg     2.50     01-17      Creatinine Trend: 2.18 <--, 2.03 <--, 1.86 <--, 1.78 <--, 1.81 <--, 1.56 <--, 1.65 <--                        10.6   8.23  )-----------( 225      ( 17 Jan 2023 06:40 )             36.2     Urine Studies:      < from: Xray Chest 1 View- PORTABLE-Urgent (Xray Chest 1 View- PORTABLE-Urgent .) (01.12.23 @ 23:31) >  IMPRESSION:  Cardiomegaly with pulmonary edema.    --- End of Report ---    < end of copied text >

## 2023-01-17 NOTE — PROGRESS NOTE ADULT - ASSESSMENT
72F with h/o COPD, pHTN, mech MVR on coumadin, Afib/flutter, MIGUEL presented with worsening SOB from baseline. On admission concern for COPD exacerbation. CXR is concerning for CHF. admit for management.       #  COPD exacerbation.   c/w solumedrol. duoneb rtc.   c/w home symbicort  provide O2 vis humidified air. might need this at home to prevent nose bleed at home.      # Diastolic CHF, acute on chronic.   elevated proBNP, b/l LE edema, +orthopnea.   patient is at baseline O2 req, but symptomatic clinically.   on Bumex 2mg PO bid. will start Bumex 4mg IV BID  strict I&O, daily weight. admit to tele  recent echo was poor study due to body habitus.   monitor clinical response to diuretics.  c/w isosorbide and statin.      #  H/O mitral valve replacement with mechanical valve.   Pt has not taken warfarin in the past 3 days due to nose bleed.   recent ELIZABETH reviewed, no issues with valve opening.   check INR and PTT. if INR< 2.5 will need to start hep gtt.  patient reports warfarin 10mg qd at home.    # Afib.   h/o Afib and Aflutter s/p ablation and recent DCCV.   c/w Amiodarone.   c/w warfarin for AC.  trend INR   Acute metabolic encephalopathy

## 2023-01-17 NOTE — PROGRESS NOTE ADULT - SUBJECTIVE AND OBJECTIVE BOX
PULMONARY PROGRESS NOTE    DAMARI GUERRERO  MRN-[id number]    Patient is a 72y old  Female who presents with a chief complaint of SOB (15 Ruddy 2023 12:27)      HPI:  says she feels better today    ROS:   -  MEDICATIONS  (STANDING):  albuterol/ipratropium for Nebulization 3 milliLiter(s) Nebulizer every 6 hours  aMIOdarone    Tablet 200 milliGRAM(s) Oral daily  budesonide 160 MICROgram(s)/formoterol 4.5 MICROgram(s) Inhaler 2 Puff(s) Inhalation two times a day  buMETAnide Injectable 2 milliGRAM(s) IV Push two times a day  dextrose 5%. 1000 milliLiter(s) (50 mL/Hr) IV Continuous <Continuous>  dextrose 5%. 1000 milliLiter(s) (100 mL/Hr) IV Continuous <Continuous>  dextrose 50% Injectable 25 Gram(s) IV Push once  dextrose 50% Injectable 12.5 Gram(s) IV Push once  dextrose 50% Injectable 25 Gram(s) IV Push once  ferrous    sulfate 325 milliGRAM(s) Oral daily  glucagon  Injectable 1 milliGRAM(s) IntraMuscular once  heparin  Infusion.  Unit(s)/Hr (19 mL/Hr) IV Continuous <Continuous>  insulin glargine Injectable (LANTUS) 25 Unit(s) SubCutaneous at bedtime  insulin lispro (ADMELOG) corrective regimen sliding scale   SubCutaneous three times a day before meals  insulin lispro (ADMELOG) corrective regimen sliding scale   SubCutaneous at bedtime  insulin lispro Injectable (ADMELOG) 10 Unit(s) SubCutaneous three times a day before meals  isosorbide   mononitrate ER Tablet (IMDUR) 30 milliGRAM(s) Oral daily  lidocaine   4% Patch 1 Patch Transdermal daily  lidocaine   4% Patch 1 Patch Transdermal daily  methylPREDNISolone sodium succinate Injectable 30 milliGRAM(s) IV Push every 8 hours  pantoprazole    Tablet 40 milliGRAM(s) Oral before breakfast  simvastatin 10 milliGRAM(s) Oral at bedtime    MEDICATIONS  (PRN):  acetaminophen     Tablet .. 650 milliGRAM(s) Oral every 6 hours PRN Temp greater or equal to 38C (100.4F), Mild Pain (1 - 3), Moderate Pain (4 - 6)  aluminum hydroxide/magnesium hydroxide/simethicone Suspension 30 milliLiter(s) Oral every 4 hours PRN Dyspepsia  dextrose Oral Gel 15 Gram(s) Oral once PRN Blood Glucose LESS THAN 70 milliGRAM(s)/deciliter  heparin   Injectable 9000 Unit(s) IV Push every 6 hours PRN For aPTT less than 40  heparin   Injectable 4000 Unit(s) IV Push every 6 hours PRN For aPTT between 40 - 57  sodium chloride 0.65% Nasal 1 Spray(s) Both Nostrils three times a day PRN Nasal Congestion      EXAM:  Vital Signs Last 24 Hrs  T(C): 36.3 (17 Jan 2023 06:35), Max: 36.8 (16 Jan 2023 18:16)  T(F): 97.4 (17 Jan 2023 06:35), Max: 98.3 (16 Jan 2023 18:16)  HR: 60 (17 Jan 2023 06:35) (59 - 88)  BP: 152/83 (17 Jan 2023 06:35) (133/72 - 158/68)  BP(mean): --  RR: 16 (17 Jan 2023 06:35) (16 - 18)  SpO2: 99% (17 Jan 2023 06:35) (95% - 99%)    Parameters below as of 17 Jan 2023 06:35  Patient On (Oxygen Delivery Method): nasal cannula  O2 Flow (L/min): 2        GENERAL: The patient is awake and alert in no apparent distress.     LUNGS:  not labored. clear lungs                            10.6   8.23  )-----------( 225      ( 17 Jan 2023 06:40 )             36.2   01-17    138  |  96<L>  |  115<H>  ----------------------------<  262<H>  5.1   |  34<H>  |  2.18<H>    Ca    9.9      17 Jan 2023 06:40  Phos  4.2     01-17  Mg     2.50     01-17         < from: Xray Chest 1 View- PORTABLE-Urgent (Xray Chest 1 View- PORTABLE-Urgent .) (01.12.23 @ 23:31) >    ACC: [id number] EXAM:  XR CHEST PORTABLE URGENT 1V                          PROCEDURE DATE:  01/12/2023          INTERPRETATION:  CLINICAL INDICATION: Shortness of breath. History of CHF   and COPD.    EXAM: Frontal radiograph of the chest.    COMPARISON: Chest radiograph from 10/23/22    FINDINGS:  Median sternotomy and valve replacement.  Indistinctiveness of pulmonary vasculature without focal consolidation.  There is no pleural effusion or pneumothorax.  Cardiomegaly.  The visualized osseous structures demonstrate no acute pathology.    IMPRESSION:  Cardiomegaly with pulmonary edema.    --- End of Report ---          AC FLORES MD; Resident Radiology  This document has been electronically signed.  DELIA PEDERSEN MD; Attending Radiologist  This document has been electronically signed. Jan 13 2023  6:53AM    < end of copied text >  < from: CT Abdomen and Pelvis No Cont (10.22.22 @ 22:38) >    ACC: [id number] EXAM:  CT ABDOMEN AND PELVIS                          PROCEDURE DATE:  10/22/2022          INTERPRETATION:  CLINICAL INFORMATION: Vomiting    COMPARISON: CT abdomen pelvis 4/12/2021    CONTRAST/COMPLICATIONS:  IV Contrast:  Oral Contrast:  Complications:    PROCEDURE:  CT of the Abdomen and Pelvis was performed.  Sagittal and coronal reformats were performed.    FINDINGS: Images are degraded by respiratory motion artifact.    LOWER CHEST: Cardiomegaly. Replaced mitral valve. Median sternotomy.   Bilateral lower lobe septal thickening and groundglass opacities likely   mild edema.    LIVER: Within normal limits.  BILE DUCTS: Normal caliber.  GALLBLADDER: Cholelithiasis.  SPLEEN: Within normal limits.  PANCREAS: Within normal limits.  ADRENALS: Within normal limits.  KIDNEYS/URETERS: Within normal limits.    BLADDER: Minimally distended.  REPRODUCTIVE ORGANS: Uterus and adnexa within normal limits.    BOWEL: Sigmoid diverticulosis. No bowel obstruction. Appendix is normal.  PERITONEUM: No ascites.  VESSELS: Atherosclerotic changes.  RETROPERITONEUM/LYMPH NODES: No lymphadenopathy.  ABDOMINAL WALL: Within normal limits.  BONES: Degenerative changes of the spine with grade 1 anterolisthesis of   L4 on L5 and L5 on S1.    IMPRESSION: Motion degraded exam.  No acute abdominal pathology.    Mild bibasilar pulmonary edema.    --- End of Report ---            GINGER HESS MD; Attending Radiologist  This document has been electronically signed. Oct 22 2022 11:39PM    < end of copied text >      PROBLEM LIST:  72y Female with HEALTH ISSUES - PROBLEM Dx:  COPD exacerbation    Diastolic CHF, acute on chronic    H/O mitral valve replacement with mechanical valve    MIGUEL treated with BiPAP    Prophylactic measure    Type 2 diabetes mellitus    Afib              RECS:  cont full ac   continue 02  needs to be on BPAP when sleeping ( previous settings in the chart)  bronchodilators  change solumedrol to prednisone 40mg PO daily today  cardio eval/ renal eval  continue PPI        Please call with any questions.  Em Merida MD  [facility] Pulmonary/Sleep Medicine  [phone]   PULMONARY PROGRESS NOTE    DAMARI GUERRERO  MRN-[id number]    Patient is a 72y old  Female who presents with a chief complaint of SOB (15 Ruddy 2023 12:27)      HPI:  says she feels better today    ROS:   -  MEDICATIONS  (STANDING):  albuterol/ipratropium for Nebulization 3 milliLiter(s) Nebulizer every 6 hours  aMIOdarone    Tablet 200 milliGRAM(s) Oral daily  budesonide 160 MICROgram(s)/formoterol 4.5 MICROgram(s) Inhaler 2 Puff(s) Inhalation two times a day  buMETAnide Injectable 2 milliGRAM(s) IV Push two times a day  dextrose 5%. 1000 milliLiter(s) (50 mL/Hr) IV Continuous <Continuous>  dextrose 5%. 1000 milliLiter(s) (100 mL/Hr) IV Continuous <Continuous>  dextrose 50% Injectable 25 Gram(s) IV Push once  dextrose 50% Injectable 12.5 Gram(s) IV Push once  dextrose 50% Injectable 25 Gram(s) IV Push once  ferrous    sulfate 325 milliGRAM(s) Oral daily  glucagon  Injectable 1 milliGRAM(s) IntraMuscular once  heparin  Infusion.  Unit(s)/Hr (19 mL/Hr) IV Continuous <Continuous>  insulin glargine Injectable (LANTUS) 25 Unit(s) SubCutaneous at bedtime  insulin lispro (ADMELOG) corrective regimen sliding scale   SubCutaneous three times a day before meals  insulin lispro (ADMELOG) corrective regimen sliding scale   SubCutaneous at bedtime  insulin lispro Injectable (ADMELOG) 10 Unit(s) SubCutaneous three times a day before meals  isosorbide   mononitrate ER Tablet (IMDUR) 30 milliGRAM(s) Oral daily  lidocaine   4% Patch 1 Patch Transdermal daily  lidocaine   4% Patch 1 Patch Transdermal daily  methylPREDNISolone sodium succinate Injectable 30 milliGRAM(s) IV Push every 8 hours  pantoprazole    Tablet 40 milliGRAM(s) Oral before breakfast  simvastatin 10 milliGRAM(s) Oral at bedtime    MEDICATIONS  (PRN):  acetaminophen     Tablet .. 650 milliGRAM(s) Oral every 6 hours PRN Temp greater or equal to 38C (100.4F), Mild Pain (1 - 3), Moderate Pain (4 - 6)  aluminum hydroxide/magnesium hydroxide/simethicone Suspension 30 milliLiter(s) Oral every 4 hours PRN Dyspepsia  dextrose Oral Gel 15 Gram(s) Oral once PRN Blood Glucose LESS THAN 70 milliGRAM(s)/deciliter  heparin   Injectable 9000 Unit(s) IV Push every 6 hours PRN For aPTT less than 40  heparin   Injectable 4000 Unit(s) IV Push every 6 hours PRN For aPTT between 40 - 57  sodium chloride 0.65% Nasal 1 Spray(s) Both Nostrils three times a day PRN Nasal Congestion      EXAM:  Vital Signs Last 24 Hrs  T(C): 36.3 (17 Jan 2023 06:35), Max: 36.8 (16 Jan 2023 18:16)  T(F): 97.4 (17 Jan 2023 06:35), Max: 98.3 (16 Jan 2023 18:16)  HR: 60 (17 Jan 2023 06:35) (59 - 88)  BP: 152/83 (17 Jan 2023 06:35) (133/72 - 158/68)  BP(mean): --  RR: 16 (17 Jan 2023 06:35) (16 - 18)  SpO2: 99% (17 Jan 2023 06:35) (95% - 99%)    Parameters below as of 17 Jan 2023 06:35  Patient On (Oxygen Delivery Method): nasal cannula  O2 Flow (L/min): 2        GENERAL: The patient is awake and alert in no apparent distress.     LUNGS:  not labored. clear lungs                            10.6   8.23  )-----------( 225      ( 17 Jan 2023 06:40 )             36.2   01-17    138  |  96<L>  |  115<H>  ----------------------------<  262<H>  5.1   |  34<H>  |  2.18<H>    Ca    9.9      17 Jan 2023 06:40  Phos  4.2     01-17  Mg     2.50     01-17         < from: Xray Chest 1 View- PORTABLE-Urgent (Xray Chest 1 View- PORTABLE-Urgent .) (01.12.23 @ 23:31) >    ACC: [id number] EXAM:  XR CHEST PORTABLE URGENT 1V                          PROCEDURE DATE:  01/12/2023          INTERPRETATION:  CLINICAL INDICATION: Shortness of breath. History of CHF   and COPD.    EXAM: Frontal radiograph of the chest.    COMPARISON: Chest radiograph from 10/23/22    FINDINGS:  Median sternotomy and valve replacement.  Indistinctiveness of pulmonary vasculature without focal consolidation.  There is no pleural effusion or pneumothorax.  Cardiomegaly.  The visualized osseous structures demonstrate no acute pathology.    IMPRESSION:  Cardiomegaly with pulmonary edema.    --- End of Report ---          AC FLORES MD; Resident Radiology  This document has been electronically signed.  DELIA PEDERSEN MD; Attending Radiologist  This document has been electronically signed. Jan 13 2023  6:53AM    < end of copied text >  < from: CT Abdomen and Pelvis No Cont (10.22.22 @ 22:38) >    ACC: [id number] EXAM:  CT ABDOMEN AND PELVIS                          PROCEDURE DATE:  10/22/2022          INTERPRETATION:  CLINICAL INFORMATION: Vomiting    COMPARISON: CT abdomen pelvis 4/12/2021    CONTRAST/COMPLICATIONS:  IV Contrast:  Oral Contrast:  Complications:    PROCEDURE:  CT of the Abdomen and Pelvis was performed.  Sagittal and coronal reformats were performed.    FINDINGS: Images are degraded by respiratory motion artifact.    LOWER CHEST: Cardiomegaly. Replaced mitral valve. Median sternotomy.   Bilateral lower lobe septal thickening and groundglass opacities likely   mild edema.    LIVER: Within normal limits.  BILE DUCTS: Normal caliber.  GALLBLADDER: Cholelithiasis.  SPLEEN: Within normal limits.  PANCREAS: Within normal limits.  ADRENALS: Within normal limits.  KIDNEYS/URETERS: Within normal limits.    BLADDER: Minimally distended.  REPRODUCTIVE ORGANS: Uterus and adnexa within normal limits.    BOWEL: Sigmoid diverticulosis. No bowel obstruction. Appendix is normal.  PERITONEUM: No ascites.  VESSELS: Atherosclerotic changes.  RETROPERITONEUM/LYMPH NODES: No lymphadenopathy.  ABDOMINAL WALL: Within normal limits.  BONES: Degenerative changes of the spine with grade 1 anterolisthesis of   L4 on L5 and L5 on S1.    IMPRESSION: Motion degraded exam.  No acute abdominal pathology.    Mild bibasilar pulmonary edema.    --- End of Report ---            GINGER HESS MD; Attending Radiologist  This document has been electronically signed. Oct 22 2022 11:39PM    < end of copied text >      PROBLEM LIST:  72y Female with HEALTH ISSUES - PROBLEM Dx:  COPD exacerbation    Diastolic CHF, acute on chronic    H/O mitral valve replacement with mechanical valve    MIGUEL treated with BiPAP    Prophylactic measure    Type 2 diabetes mellitus    Afib              RECS:  cont full ac   continue 02  needs to be on BPAP when sleeping ( previous settings in the chart)  bronchodilators  change solumedrol to prednisone 40mg PO daily tomorrow  cardio eval/ renal eval  continue PPI        Please call with any questions.  Em Merida MD  [facility] Pulmonary/Sleep Medicine  [phone]

## 2023-01-18 LAB
ANION GAP SERPL CALC-SCNC: 9 MMOL/L — SIGNIFICANT CHANGE UP (ref 7–14)
APTT BLD: 100.2 SEC — HIGH (ref 27–36.3)
BASOPHILS # BLD AUTO: 0.01 K/UL — SIGNIFICANT CHANGE UP (ref 0–0.2)
BASOPHILS NFR BLD AUTO: 0.1 % — SIGNIFICANT CHANGE UP (ref 0–2)
BUN SERPL-MCNC: 117 MG/DL — HIGH (ref 7–23)
CALCIUM SERPL-MCNC: 10.6 MG/DL — HIGH (ref 8.4–10.5)
CHLORIDE SERPL-SCNC: 92 MMOL/L — LOW (ref 98–107)
CO2 SERPL-SCNC: 35 MMOL/L — HIGH (ref 22–31)
CREAT SERPL-MCNC: 1.92 MG/DL — HIGH (ref 0.5–1.3)
EGFR: 27 ML/MIN/1.73M2 — LOW
EOSINOPHIL # BLD AUTO: 0 K/UL — SIGNIFICANT CHANGE UP (ref 0–0.5)
EOSINOPHIL NFR BLD AUTO: 0 % — SIGNIFICANT CHANGE UP (ref 0–6)
GLUCOSE BLDC GLUCOMTR-MCNC: 175 MG/DL — HIGH (ref 70–99)
GLUCOSE BLDC GLUCOMTR-MCNC: 210 MG/DL — HIGH (ref 70–99)
GLUCOSE BLDC GLUCOMTR-MCNC: 246 MG/DL — HIGH (ref 70–99)
GLUCOSE BLDC GLUCOMTR-MCNC: 440 MG/DL — HIGH (ref 70–99)
GLUCOSE SERPL-MCNC: 200 MG/DL — HIGH (ref 70–99)
HCT VFR BLD CALC: 39.3 % — SIGNIFICANT CHANGE UP (ref 34.5–45)
HGB BLD-MCNC: 11.8 G/DL — SIGNIFICANT CHANGE UP (ref 11.5–15.5)
IANC: 9.39 K/UL — HIGH (ref 1.8–7.4)
IMM GRANULOCYTES NFR BLD AUTO: 1.3 % — HIGH (ref 0–0.9)
INR BLD: 2.77 RATIO — HIGH (ref 0.88–1.16)
LYMPHOCYTES # BLD AUTO: 0.42 K/UL — LOW (ref 1–3.3)
LYMPHOCYTES # BLD AUTO: 3.9 % — LOW (ref 13–44)
MAGNESIUM SERPL-MCNC: 2.6 MG/DL — SIGNIFICANT CHANGE UP (ref 1.6–2.6)
MCHC RBC-ENTMCNC: 29.8 PG — SIGNIFICANT CHANGE UP (ref 27–34)
MCHC RBC-ENTMCNC: 30 GM/DL — LOW (ref 32–36)
MCV RBC AUTO: 99.2 FL — SIGNIFICANT CHANGE UP (ref 80–100)
MONOCYTES # BLD AUTO: 0.86 K/UL — SIGNIFICANT CHANGE UP (ref 0–0.9)
MONOCYTES NFR BLD AUTO: 7.9 % — SIGNIFICANT CHANGE UP (ref 2–14)
NEUTROPHILS # BLD AUTO: 9.39 K/UL — HIGH (ref 1.8–7.4)
NEUTROPHILS NFR BLD AUTO: 86.8 % — HIGH (ref 43–77)
NRBC # BLD: 0 /100 WBCS — SIGNIFICANT CHANGE UP (ref 0–0)
NRBC # FLD: 0 K/UL — SIGNIFICANT CHANGE UP (ref 0–0)
PHOSPHATE SERPL-MCNC: 2.5 MG/DL — SIGNIFICANT CHANGE UP (ref 2.5–4.5)
PLATELET # BLD AUTO: 251 K/UL — SIGNIFICANT CHANGE UP (ref 150–400)
POTASSIUM SERPL-MCNC: 4.8 MMOL/L — SIGNIFICANT CHANGE UP (ref 3.5–5.3)
POTASSIUM SERPL-SCNC: 4.8 MMOL/L — SIGNIFICANT CHANGE UP (ref 3.5–5.3)
PROTHROM AB SERPL-ACNC: 32.5 SEC — HIGH (ref 10.5–13.4)
RBC # BLD: 3.96 M/UL — SIGNIFICANT CHANGE UP (ref 3.8–5.2)
RBC # FLD: 14.3 % — SIGNIFICANT CHANGE UP (ref 10.3–14.5)
SODIUM SERPL-SCNC: 136 MMOL/L — SIGNIFICANT CHANGE UP (ref 135–145)
WBC # BLD: 10.82 K/UL — HIGH (ref 3.8–10.5)
WBC # FLD AUTO: 10.82 K/UL — HIGH (ref 3.8–10.5)

## 2023-01-18 PROCEDURE — 99232 SBSQ HOSP IP/OBS MODERATE 35: CPT

## 2023-01-18 RX ORDER — HYDRALAZINE HCL 50 MG
50 TABLET ORAL THREE TIMES A DAY
Refills: 0 | Status: DISCONTINUED | OUTPATIENT
Start: 2023-01-18 | End: 2023-01-24

## 2023-01-18 RX ORDER — ACETAMINOPHEN 500 MG
1000 TABLET ORAL ONCE
Refills: 0 | Status: DISCONTINUED | OUTPATIENT
Start: 2023-01-18 | End: 2023-01-24

## 2023-01-18 RX ORDER — WARFARIN SODIUM 2.5 MG/1
5 TABLET ORAL ONCE
Refills: 0 | Status: COMPLETED | OUTPATIENT
Start: 2023-01-18 | End: 2023-01-18

## 2023-01-18 RX ORDER — HYDRALAZINE HCL 50 MG
25 TABLET ORAL ONCE
Refills: 0 | Status: COMPLETED | OUTPATIENT
Start: 2023-01-18 | End: 2023-01-18

## 2023-01-18 RX ADMIN — Medication 25 MILLIGRAM(S): at 05:18

## 2023-01-18 RX ADMIN — LIDOCAINE 1 PATCH: 4 CREAM TOPICAL at 11:40

## 2023-01-18 RX ADMIN — Medication 25 MILLIGRAM(S): at 08:20

## 2023-01-18 RX ADMIN — Medication 3 MILLILITER(S): at 08:00

## 2023-01-18 RX ADMIN — BUDESONIDE AND FORMOTEROL FUMARATE DIHYDRATE 2 PUFF(S): 160; 4.5 AEROSOL RESPIRATORY (INHALATION) at 08:20

## 2023-01-18 RX ADMIN — Medication 650 MILLIGRAM(S): at 21:40

## 2023-01-18 RX ADMIN — Medication 4: at 18:21

## 2023-01-18 RX ADMIN — LIDOCAINE 1 PATCH: 4 CREAM TOPICAL at 23:00

## 2023-01-18 RX ADMIN — Medication 40 MILLIGRAM(S): at 05:18

## 2023-01-18 RX ADMIN — Medication 10 UNIT(S): at 12:42

## 2023-01-18 RX ADMIN — Medication 4: at 09:09

## 2023-01-18 RX ADMIN — Medication 30 MILLILITER(S): at 11:45

## 2023-01-18 RX ADMIN — LIDOCAINE 1 PATCH: 4 CREAM TOPICAL at 20:18

## 2023-01-18 RX ADMIN — HEPARIN SODIUM 700 UNIT(S)/HR: 5000 INJECTION INTRAVENOUS; SUBCUTANEOUS at 07:21

## 2023-01-18 RX ADMIN — BUDESONIDE AND FORMOTEROL FUMARATE DIHYDRATE 2 PUFF(S): 160; 4.5 AEROSOL RESPIRATORY (INHALATION) at 21:49

## 2023-01-18 RX ADMIN — Medication 10 UNIT(S): at 18:21

## 2023-01-18 RX ADMIN — HEPARIN SODIUM 700 UNIT(S)/HR: 5000 INJECTION INTRAVENOUS; SUBCUTANEOUS at 07:00

## 2023-01-18 RX ADMIN — Medication 50 MILLIGRAM(S): at 14:47

## 2023-01-18 RX ADMIN — Medication 325 MILLIGRAM(S): at 11:40

## 2023-01-18 RX ADMIN — AMIODARONE HYDROCHLORIDE 200 MILLIGRAM(S): 400 TABLET ORAL at 05:18

## 2023-01-18 RX ADMIN — BUMETANIDE 5 MG/HR: 0.25 INJECTION INTRAMUSCULAR; INTRAVENOUS at 20:23

## 2023-01-18 RX ADMIN — ISOSORBIDE MONONITRATE 30 MILLIGRAM(S): 60 TABLET, EXTENDED RELEASE ORAL at 11:40

## 2023-01-18 RX ADMIN — Medication 10 UNIT(S): at 09:09

## 2023-01-18 RX ADMIN — PANTOPRAZOLE SODIUM 40 MILLIGRAM(S): 20 TABLET, DELAYED RELEASE ORAL at 05:18

## 2023-01-18 RX ADMIN — Medication 650 MILLIGRAM(S): at 11:41

## 2023-01-18 RX ADMIN — Medication 30 MILLILITER(S): at 23:07

## 2023-01-18 RX ADMIN — Medication 3 MILLILITER(S): at 20:43

## 2023-01-18 RX ADMIN — SIMVASTATIN 10 MILLIGRAM(S): 20 TABLET, FILM COATED ORAL at 21:50

## 2023-01-18 RX ADMIN — Medication 12: at 12:42

## 2023-01-18 RX ADMIN — WARFARIN SODIUM 5 MILLIGRAM(S): 2.5 TABLET ORAL at 21:50

## 2023-01-18 RX ADMIN — BUMETANIDE 5 MG/HR: 0.25 INJECTION INTRAMUSCULAR; INTRAVENOUS at 11:02

## 2023-01-18 RX ADMIN — Medication 50 MILLIGRAM(S): at 21:50

## 2023-01-18 RX ADMIN — Medication 3 MILLILITER(S): at 03:34

## 2023-01-18 RX ADMIN — Medication 3 MILLILITER(S): at 14:05

## 2023-01-18 RX ADMIN — Medication 650 MILLIGRAM(S): at 16:43

## 2023-01-18 RX ADMIN — Medication 650 MILLIGRAM(S): at 22:40

## 2023-01-18 RX ADMIN — INSULIN GLARGINE 25 UNIT(S): 100 INJECTION, SOLUTION SUBCUTANEOUS at 21:42

## 2023-01-18 NOTE — PROGRESS NOTE ADULT - SUBJECTIVE AND OBJECTIVE BOX
PULMONARY PROGRESS NOTE    DAMARI GUERRERO  MRN-[id number]    Patient is a 72y old  Female who presents with a chief complaint of SOB (15 Ruddy 2023 12:27)      HPI:  says she feels better today    MEDICATIONS  (STANDING):  albuterol/ipratropium for Nebulization 3 milliLiter(s) Nebulizer every 6 hours  aMIOdarone    Tablet 200 milliGRAM(s) Oral daily  budesonide 160 MICROgram(s)/formoterol 4.5 MICROgram(s) Inhaler 2 Puff(s) Inhalation two times a day  buMETAnide Infusion 1 mG/Hr (5 mL/Hr) IV Continuous <Continuous>  dextrose 5%. 1000 milliLiter(s) (50 mL/Hr) IV Continuous <Continuous>  dextrose 5%. 1000 milliLiter(s) (100 mL/Hr) IV Continuous <Continuous>  dextrose 50% Injectable 25 Gram(s) IV Push once  dextrose 50% Injectable 12.5 Gram(s) IV Push once  dextrose 50% Injectable 25 Gram(s) IV Push once  ferrous    sulfate 325 milliGRAM(s) Oral daily  glucagon  Injectable 1 milliGRAM(s) IntraMuscular once  heparin  Infusion.  Unit(s)/Hr (19 mL/Hr) IV Continuous <Continuous>  hydrALAZINE 50 milliGRAM(s) Oral three times a day  insulin glargine Injectable (LANTUS) 25 Unit(s) SubCutaneous at bedtime  insulin lispro (ADMELOG) corrective regimen sliding scale   SubCutaneous three times a day before meals  insulin lispro (ADMELOG) corrective regimen sliding scale   SubCutaneous at bedtime  insulin lispro Injectable (ADMELOG) 10 Unit(s) SubCutaneous three times a day before meals  isosorbide   mononitrate ER Tablet (IMDUR) 30 milliGRAM(s) Oral daily  lidocaine   4% Patch 1 Patch Transdermal daily  lidocaine   4% Patch 1 Patch Transdermal daily  pantoprazole    Tablet 40 milliGRAM(s) Oral before breakfast  predniSONE   Tablet 40 milliGRAM(s) Oral daily  simvastatin 10 milliGRAM(s) Oral at bedtime  warfarin 5 milliGRAM(s) Oral once    MEDICATIONS  (PRN):  acetaminophen     Tablet .. 650 milliGRAM(s) Oral every 6 hours PRN Temp greater or equal to 38C (100.4F), Mild Pain (1 - 3), Moderate Pain (4 - 6)  aluminum hydroxide/magnesium hydroxide/simethicone Suspension 30 milliLiter(s) Oral every 4 hours PRN Dyspepsia  dextrose Oral Gel 15 Gram(s) Oral once PRN Blood Glucose LESS THAN 70 milliGRAM(s)/deciliter  heparin   Injectable 9000 Unit(s) IV Push every 6 hours PRN For aPTT less than 40  heparin   Injectable 4000 Unit(s) IV Push every 6 hours PRN For aPTT between 40 - 57  sodium chloride 0.65% Nasal 1 Spray(s) Both Nostrils three times a day PRN Nasal Congestion        EXAM:  Vital Signs Last 24 Hrs  T(C): 36.8 (18 Jan 2023 08:10), Max: 36.8 (18 Jan 2023 08:10)  T(F): 98.2 (18 Jan 2023 08:10), Max: 98.2 (18 Jan 2023 08:10)  HR: 87 (18 Jan 2023 10:02) (68 - 98)  BP: 158/90 (18 Jan 2023 10:02) (146/66 - 190/117)  BP(mean): 136 (18 Jan 2023 10:02) (136 - 157)  RR: 19 (18 Jan 2023 08:10) (18 - 19)  SpO2: 97% (18 Jan 2023 08:10) (95% - 99%)    Parameters below as of 18 Jan 2023 08:10  Patient On (Oxygen Delivery Method): nasal cannula  O2 Flow (L/min): 2      GENERAL: The patient is awake and alert in no apparent distress.     LUNGS:  not labored. clear lungs                            11.8   10.82 )-----------( 251      ( 18 Jan 2023 06:32 )             39.3   01-18    136  |  92<L>  |  117<H>  ----------------------------<  200<H>  4.8   |  35<H>  |  1.92<H>    Ca    10.6<H>      18 Jan 2023 06:32  Phos  2.5     01-18  Mg     2.60     01-18           < from: Xray Chest 1 View- PORTABLE-Urgent (Xray Chest 1 View- PORTABLE-Urgent .) (01.12.23 @ 23:31) >    ACC: [id number] EXAM:  XR CHEST PORTABLE URGENT 1V                          PROCEDURE DATE:  01/12/2023          INTERPRETATION:  CLINICAL INDICATION: Shortness of breath. History of CHF   and COPD.    EXAM: Frontal radiograph of the chest.    COMPARISON: Chest radiograph from 10/23/22    FINDINGS:  Median sternotomy and valve replacement.  Indistinctiveness of pulmonary vasculature without focal consolidation.  There is no pleural effusion or pneumothorax.  Cardiomegaly.  The visualized osseous structures demonstrate no acute pathology.    IMPRESSION:  Cardiomegaly with pulmonary edema.    --- End of Report ---          AC FLORES MD; Resident Radiology  This document has been electronically signed.  DELIA PEDERSEN MD; Attending Radiologist  This document has been electronically signed. Jan 13 2023  6:53AM    < end of copied text >  < from: CT Abdomen and Pelvis No Cont (10.22.22 @ 22:38) >    ACC: [id number] EXAM:  CT ABDOMEN AND PELVIS                          PROCEDURE DATE:  10/22/2022          INTERPRETATION:  CLINICAL INFORMATION: Vomiting    COMPARISON: CT abdomen pelvis 4/12/2021    CONTRAST/COMPLICATIONS:  IV Contrast:  Oral Contrast:  Complications:    PROCEDURE:  CT of the Abdomen and Pelvis was performed.  Sagittal and coronal reformats were performed.    FINDINGS: Images are degraded by respiratory motion artifact.    LOWER CHEST: Cardiomegaly. Replaced mitral valve. Median sternotomy.   Bilateral lower lobe septal thickening and groundglass opacities likely   mild edema.    LIVER: Within normal limits.  BILE DUCTS: Normal caliber.  GALLBLADDER: Cholelithiasis.  SPLEEN: Within normal limits.  PANCREAS: Within normal limits.  ADRENALS: Within normal limits.  KIDNEYS/URETERS: Within normal limits.    BLADDER: Minimally distended.  REPRODUCTIVE ORGANS: Uterus and adnexa within normal limits.    BOWEL: Sigmoid diverticulosis. No bowel obstruction. Appendix is normal.  PERITONEUM: No ascites.  VESSELS: Atherosclerotic changes.  RETROPERITONEUM/LYMPH NODES: No lymphadenopathy.  ABDOMINAL WALL: Within normal limits.  BONES: Degenerative changes of the spine with grade 1 anterolisthesis of   L4 on L5 and L5 on S1.    IMPRESSION: Motion degraded exam.  No acute abdominal pathology.    Mild bibasilar pulmonary edema.    --- End of Report ---            GINGER HESS MD; Attending Radiologist  This document has been electronically signed. Oct 22 2022 11:39PM    < end of copied text >      PROBLEM LIST:  72y Female with HEALTH ISSUES - PROBLEM Dx:  COPD exacerbation    Diastolic CHF, acute on chronic    H/O mitral valve replacement with mechanical valve    MIGUEL treated with BiPAP    Prophylactic measure    Type 2 diabetes mellitus    Afib              RECS:  cont full ac   continue 02  needs to be on BPAP when sleeping ( previous settings in the chart)  bronchodilators  prednisone 40mg PO daily, taper by 10mg every 2 days  cardio eval/ renal eval  continue PPI        Please call with any questions.  Em Merida MD  [facility] Pulmonary/Sleep Medicine  [phone]

## 2023-01-18 NOTE — PROGRESS NOTE ADULT - SUBJECTIVE AND OBJECTIVE BOX
[facility] NEPHROLOGY- PROGRESS NOTE    72 year old Female with history of COPD, CHF presents with weakness. Nephrology consulted for elevated Scr.    REVIEW OF SYSTEMS:  Gen: no fevers  Cards: no chest pain  Resp: + dyspnea improving  GI: no nausea or vomiting or diarrhea  Vascular: + LE edema    No Known Allergies      Hospital Medications: Medications reviewed    VITALS:  T(F): 98.2 (23 @ 12:06), Max: 98.2 (23 @ 08:10)  HR: 87 (23 @ 12:06)  BP: 175/76 (23 @ 12:06)  RR: 19 (23 @ 12:06)  SpO2: 99% (23 12:06)  Wt(kg): --  Height (cm): 162.6 ( 12:50)  Weight (kg): 108.862 ( 12:50)  BMI (kg/m2): 41.2 ( 12:50)  BSA (m2): 2.11 ( 12:50)     @ 07: @ 07:00  --------------------------------------------------------  IN: 175 mL / OUT: 1400 mL / NET: -1225 mL     @ 07: @ 13:20  --------------------------------------------------------  IN: 400 mL / OUT: 1200 mL / NET: -800 mL        PHYSICAL EXAM:    Gen: NAD, calm  Cards: RRR, +S1/S2, no M/G/R  Resp: CTA B/L  GI: soft, NT/ND, NABS  Vascular: no LE edema B/L    LABS:      136  |  92<L>  |  117<H>  ----------------------------<  200<H>  4.8   |  35<H>  |  1.92<H>    Ca    10.6<H>      2023 06:32  Phos  2.5       Mg     2.60           Creatinine Trend: 1.92 <--, 2.18 <--, 2.03 <--, 1.86 <--, 1.78 <--, 1.81 <--, 1.56 <--, 1.65 <--                        11.8   10.82 )-----------( 251      ( 2023 06:32 )             39.3     Urine Studies:  Urinalysis Basic - ( 2023 17:54 )    Color: Colorless / Appearance: Clear / S.011 / pH:   Gluc:  / Ketone: Negative  / Bili: Negative / Urobili: <2 mg/dL   Blood:  / Protein: Negative / Nitrite: Negative   Leuk Esterase: Negative / RBC: 1 /HPF / WBC 1 /HPF   Sq Epi:  / Non Sq Epi:  / Bacteria: Negative      Creatinine, Random Urine: 17 mg/dL ( @ 17:54)      RADIOLOGY & ADDITIONAL STUDIES:  < from: Xray Chest 1 View- PORTABLE-Urgent (Xray Chest 1 View- PORTABLE-Urgent .) (23 @ 12:17) >  IMPRESSION:  Worsening pulmonary vascular congestion.    --- End of Report ---    < end of copied text >

## 2023-01-18 NOTE — PROGRESS NOTE ADULT - ASSESSMENT
72F with h/o COPD, pHTN, mech MVR on coumadin, Afib/flutter, MIGUEL presented with worsening SOB from baseline. On admission concern for COPD exacerbation. CXR is concerning for CHF. admit for management.       #  COPD exacerbation.   COPD exacerbation - 2L NC SpO2 91%  - s/p IV solumedrol, switched to PO 1/18   - BiPAP ordered as per pulm  - Monitor FS while on steroids    # Afib/mechanical valve - Goal INR 2.5-3.5  - Subtherapeutic INR on admission because held coumadin for 3 days prior 2/2 epistaxis  - s/p Heparin gtt to coumadin bridge  - Coumadin 5mg dosed 1/18, INR 2.7        # Diastolic CHF, acute on chronic.   trend 2.0-3.0 for most medical and surgical thromboembolic states.  2.0-3.0 for atrial fibrillation.  2.0-3.0 for bileaflet mechanical valve in aortic position.  2.5-3.5 for mechanical heart valves.  Chest [id number].  The presence of direct thrombin inhibitors(argatroban,refludan may falsely increase results,ratio.bumex, now on Bumex gtt (cxr with increased vascular congestion)   - strict I&O - patient using commode     # Afib.   h/o Afib and Aflutter s/p ablation and recent DCCV.   c/w Amiodarone.   c/w warfarin for AC.  trend INR    # HTN - Hydralazine

## 2023-01-18 NOTE — PROGRESS NOTE ADULT - SUBJECTIVE AND OBJECTIVE BOX
PATIENT SEEN AND EXAMINED ON :- 1/18/23  DATE OF SERVICE:    1/18/23         Interim events noted,Labs ,Radiological studies and Cardiology tests reviewed        HOSPITAL COURSE: HPI:  72F with PMH COPD/MIGUEL/OHS (home BiPAP/ 3L NC), pHTN, MVR on Coumadin AF s/p ablation and recent DCCV on 11/4/22 pw SOB. Patient reports she has been feeling progressive SOB over the past 1 week, associated with orthopniea and intermittent CP. Per patient CP has been chronic and no change in intensity. She received treatment and steroids in ED which help with her symptoms. denied fever, chill, coughing, abd pain, worsening LE edema or diarrhea.   of note, patient endorsed she has not taking warfarin for the past 3 days due to nose bleed.  (13 Jan 2023 11:48)      INTERIM EVENTS:Patient seen at bedside ,interim events noted.      PMH -reviewed admission note, no change since admission  HEART FAILURE: Acute[ ]Chronic[ ] Systolic[ ] Diastolic[ ] Combined Systolic and Diastolic[ ]  CAD[ ] CABG[ ] PCI[ ]  DEVICES[ ] PPM[ ] ICD[ ] ILR[ ]  ATRIAL FIBRILLATION[ ] Paroxysmal[ ] Permanent[ ] CHADS2-[  ]  MISAEL[ ] CKD1[ ] CKD2[ ] CKD3[ ] CKD4[ ] ESRD[ ]  COPD[ ] HTN[ ]   DM[ ] Type1[ ] Type 2[ ]   CVA[ ] Paresis[ ]    AMBULATION: Assisted[ ] Cane/walker[ ] Independent[ ]    MEDICATIONS  (STANDING):  acetaminophen   IVPB .. 1000 milliGRAM(s) IV Intermittent once  albuterol/ipratropium for Nebulization 3 milliLiter(s) Nebulizer every 6 hours  aMIOdarone    Tablet 200 milliGRAM(s) Oral daily  budesonide 160 MICROgram(s)/formoterol 4.5 MICROgram(s) Inhaler 2 Puff(s) Inhalation two times a day  buMETAnide Infusion 1 mG/Hr (5 mL/Hr) IV Continuous <Continuous>  dextrose 5%. 1000 milliLiter(s) (100 mL/Hr) IV Continuous <Continuous>  dextrose 5%. 1000 milliLiter(s) (50 mL/Hr) IV Continuous <Continuous>  dextrose 50% Injectable 25 Gram(s) IV Push once  dextrose 50% Injectable 12.5 Gram(s) IV Push once  dextrose 50% Injectable 25 Gram(s) IV Push once  ferrous    sulfate 325 milliGRAM(s) Oral daily  glucagon  Injectable 1 milliGRAM(s) IntraMuscular once  hydrALAZINE 50 milliGRAM(s) Oral three times a day  insulin glargine Injectable (LANTUS) 25 Unit(s) SubCutaneous at bedtime  insulin lispro (ADMELOG) corrective regimen sliding scale   SubCutaneous three times a day before meals  insulin lispro (ADMELOG) corrective regimen sliding scale   SubCutaneous at bedtime  insulin lispro Injectable (ADMELOG) 10 Unit(s) SubCutaneous three times a day before meals  isosorbide   mononitrate ER Tablet (IMDUR) 30 milliGRAM(s) Oral daily  lidocaine   4% Patch 1 Patch Transdermal daily  lidocaine   4% Patch 1 Patch Transdermal daily  pantoprazole    Tablet 40 milliGRAM(s) Oral before breakfast  predniSONE   Tablet 40 milliGRAM(s) Oral daily  simvastatin 10 milliGRAM(s) Oral at bedtime  warfarin 5 milliGRAM(s) Oral once    MEDICATIONS  (PRN):  acetaminophen     Tablet .. 650 milliGRAM(s) Oral every 6 hours PRN Temp greater or equal to 38C (100.4F), Mild Pain (1 - 3), Moderate Pain (4 - 6)  aluminum hydroxide/magnesium hydroxide/simethicone Suspension 30 milliLiter(s) Oral every 4 hours PRN Dyspepsia  dextrose Oral Gel 15 Gram(s) Oral once PRN Blood Glucose LESS THAN 70 milliGRAM(s)/deciliter  sodium chloride 0.65% Nasal 1 Spray(s) Both Nostrils three times a day PRN Nasal Congestion            REVIEW OF SYSTEMS:  Constitutional: [ ] fever, [ ]weight loss,  [ ]fatigue [ ]weight gain  Eyes: [ ] visual changes  Respiratory: [ ]shortness of breath;  [ ] cough, [ ]wheezing, [ ]chills, [ ]hemoptysis  Cardiovascular: [ ] chest pain, [ ]palpitations, [ ]dizziness,  [ ]leg swelling[ ]orthopnea[ ]PND  Gastrointestinal: [ ] abdominal pain, [ ]nausea, [ ]vomiting,  [ ]diarrhea [ ]Constipation [ ]Melena  Genitourinary: [ ] dysuria, [ ] hematuria [ ]Junior  Neurologic: [ ] headaches [ ] tremors[ ]weakness [ ]Paralysis Right[ ] Left[ ]  Skin: [ ] itching, [ ]burning, [ ] rashes  Endocrine: [ ] heat or cold intolerance  Musculoskeletal: [ ] joint pain or swelling; [ ] muscle, back, or extremity pain  Psychiatric: [ ] depression, [ ]anxiety, [ ]mood swings, or [ ]difficulty sleeping  Hematologic: [ ] easy bruising, [ ] bleeding gums    [ ] All remaining systems negative except as per above.   [ ]Unable to obtain.  [x] No change in ROS since admission      Vital Signs Last 24 Hrs  T(C): 36.8 (18 Jan 2023 12:06), Max: 36.8 (18 Jan 2023 08:10)  T(F): 98.2 (18 Jan 2023 12:06), Max: 98.2 (18 Jan 2023 08:10)  HR: 84 (18 Jan 2023 20:46) (74 - 98)  BP: 156/88 (18 Jan 2023 15:15) (156/88 - 190/117)  BP(mean): 121 (18 Jan 2023 15:15) (121 - 157)  RR: 18 (18 Jan 2023 15:15) (18 - 19)  SpO2: 98% (18 Jan 2023 20:46) (96% - 99%)    Parameters below as of 18 Jan 2023 20:46  Patient On (Oxygen Delivery Method): nasal cannula      I&O's Summary    17 Jan 2023 07:01  -  18 Jan 2023 07:00  --------------------------------------------------------  IN: 175 mL / OUT: 1400 mL / NET: -1225 mL    18 Jan 2023 07:01  -  18 Jan 2023 21:41  --------------------------------------------------------  IN: 550 mL / OUT: 1500 mL / NET: -950 mL        PHYSICAL EXAM:  General: No acute distress BMI-  HEENT: EOMI, PERRL  Neck: Supple, [ ] JVD  Lungs: Equal air entry bilaterally; [ ] rales [ ] wheezing [ ] rhonchi  Heart: Regular rate and rhythm; [x ] murmur   2/6 [ x] systolic [ ] diastolic [ ] radiation[ ] rubs [ ]  gallops  Abdomen: Nontender, bowel sounds present  Extremities: No clubbing, cyanosis, [ ] edema [ ]Pulses  equal and intact  Nervous system:  Alert & Oriented X3, no focal deficits  Psychiatric: Normal affect  Skin: No rashes or lesions    LABS:  01-18    136  |  92<L>  |  117<H>  ----------------------------<  200<H>  4.8   |  35<H>  |  1.92<H>    Ca    10.6<H>      18 Jan 2023 06:32  Phos  2.5     01-18  Mg     2.60     01-18      Creatinine Trend: 1.92<--, 2.18<--, 2.03<--, 1.86<--, 1.78<--, 1.81<--                        11.8   10.82 )-----------( 251      ( 18 Jan 2023 06:32 )             39.3     PT/INR - ( 18 Jan 2023 06:32 )   PT: 32.5 sec;   INR: 2.77 ratio         PTT - ( 18 Jan 2023 06:32 )  PTT:100.2 sec

## 2023-01-18 NOTE — PROGRESS NOTE ADULT - SUBJECTIVE AND OBJECTIVE BOX
SUBJECTIVE / OVERNIGHT EVENTS:pt seen and examined  23     MEDICATIONS  (STANDING):  acetaminophen   IVPB .. 1000 milliGRAM(s) IV Intermittent once  albuterol/ipratropium for Nebulization 3 milliLiter(s) Nebulizer every 6 hours  aMIOdarone    Tablet 200 milliGRAM(s) Oral daily  budesonide 160 MICROgram(s)/formoterol 4.5 MICROgram(s) Inhaler 2 Puff(s) Inhalation two times a day  buMETAnide Infusion 1 mG/Hr (5 mL/Hr) IV Continuous <Continuous>  dextrose 5%. 1000 milliLiter(s) (50 mL/Hr) IV Continuous <Continuous>  dextrose 5%. 1000 milliLiter(s) (100 mL/Hr) IV Continuous <Continuous>  dextrose 50% Injectable 25 Gram(s) IV Push once  dextrose 50% Injectable 12.5 Gram(s) IV Push once  dextrose 50% Injectable 25 Gram(s) IV Push once  ferrous    sulfate 325 milliGRAM(s) Oral daily  glucagon  Injectable 1 milliGRAM(s) IntraMuscular once  hydrALAZINE 50 milliGRAM(s) Oral three times a day  insulin glargine Injectable (LANTUS) 25 Unit(s) SubCutaneous at bedtime  insulin lispro (ADMELOG) corrective regimen sliding scale   SubCutaneous three times a day before meals  insulin lispro (ADMELOG) corrective regimen sliding scale   SubCutaneous at bedtime  insulin lispro Injectable (ADMELOG) 10 Unit(s) SubCutaneous three times a day before meals  isosorbide   mononitrate ER Tablet (IMDUR) 30 milliGRAM(s) Oral daily  lidocaine   4% Patch 1 Patch Transdermal daily  lidocaine   4% Patch 1 Patch Transdermal daily  pantoprazole    Tablet 40 milliGRAM(s) Oral before breakfast  predniSONE   Tablet 40 milliGRAM(s) Oral daily  simvastatin 10 milliGRAM(s) Oral at bedtime    MEDICATIONS  (PRN):  acetaminophen     Tablet .. 650 milliGRAM(s) Oral every 6 hours PRN Temp greater or equal to 38C (100.4F), Mild Pain (1 - 3), Moderate Pain (4 - 6)  aluminum hydroxide/magnesium hydroxide/simethicone Suspension 30 milliLiter(s) Oral every 4 hours PRN Dyspepsia  dextrose Oral Gel 15 Gram(s) Oral once PRN Blood Glucose LESS THAN 70 milliGRAM(s)/deciliter  sodium chloride 0.65% Nasal 1 Spray(s) Both Nostrils three times a day PRN Nasal Congestion    Vital Signs Last 24 Hrs  T(C): 36.3 (23 @ 21:54), Max: 36.8 (23 @ 08:10)  T(F): 97.4 (23 @ 21:54), Max: 98.2 (23 @ 08:10)  HR: 86 (23 @ 21:54) (74 - 98)  BP: 154/89 (23 @ 21:54) (154/89 - 190/117)  BP(mean): 121 (23 @ 15:15) (121 - 157)  RR: 18 (23 @ 21:54) (18 - 19)  SpO2: 96% (23 @ 21:54) (96% - 99%)    Constitutional: No fever, fatigue  Skin: No rash.  Eyes: No recent vision problems or eye pain.  ENT: No congestion, ear pain, or sore throat.  Cardiovascular: No chest pain or palpation.  Respiratory: No cough, shortness of breath, congestion, or wheezing.  Gastrointestinal: No abdominal pain, nausea, vomiting, or diarrhea.  Genitourinary: No dysuria.  Musculoskeletal: No joint swelling.  Neurologic: No headache.    PHYSICAL EXAM:  GENERAL: NAD  EYES: EOMI, PERRLA  NECK: Supple, No JVD  CHEST/LUNG: dec breath sounds at bases  HEART:  S1 , S2 +  ABDOMEN: soft, bs+  EXTREMITIES:  edema+  NEUROLOGY:alert awake oriented       LABS:      136  |  92<L>  |  117<H>  ----------------------------<  200<H>  4.8   |  35<H>  |  1.92<H>    Ca    10.6<H>      2023 06:32  Phos  2.5       Mg     2.60           Creatinine Trend: 1.92 <--, 2.18 <--, 2.03 <--, 1.86 <--, 1.78 <--, 1.81 <--, 1.56 <--, 1.65 <--                        11.8   10.82 )-----------( 251      ( 2023 06:32 )             39.3     Urine Studies:  Urinalysis Basic - ( 2023 17:54 )    Color: Colorless / Appearance: Clear / S.011 / pH:   Gluc:  / Ketone: Negative  / Bili: Negative / Urobili: <2 mg/dL   Blood:  / Protein: Negative / Nitrite: Negative   Leuk Esterase: Negative / RBC: 1 /HPF / WBC 1 /HPF   Sq Epi:  / Non Sq Epi:  / Bacteria: Negative      Creatinine, Random Urine: 17 mg/dL ( @ 17:54)            PT/INR - ( 2023 06:32 )   PT: 32.5 sec;   INR: 2.77 ratio         PTT - ( 2023 06:32 )  PTT:100.2 sec           PTT - ( 2023 16:07 )  PTT:43.4 sec  c        RADIOLOGY & ADDITIONAL TESTS:    Imaging Personally Reviewed:yes    Consultant(s) Notes Reviewed:  yes    Care Discussed with Consultants/Other Providers:yes

## 2023-01-18 NOTE — PROGRESS NOTE ADULT - ASSESSMENT
72 year old Female with history of COPD, CHF presents with weakness. Nephrology consulted for elevated Scr.    1) MISAEL: likely hemodynamically mediated in setting of diuresis/volume overload. Scr improving on bumex gtt with azotemia in part due to steroids. UA bland. FeNa low. Bladder scan negative. Avoid nephrotoxins.    2) CKD-3b: Baseline Scr 1.6-1.8 thought to be secondary to DM. Outpatient CKD work up. Monitor electrolytes.    3) HTN with CKD: BP uncontrolled for which Hydralazine increased to 50 mg PO TID. Monitor BP.    4) LE edema: Improving on bumex gtt @ 1 mg/hour. Continue with gtt as patient remains volume overloaded. Prior TTE with mild to moderate global LVSD. Monitor UO.      [facility] NEPHROLOGY  Rodrigue Amador M.D.  Rob Perez D.O.  Ana Song M.D.  Lucrecia López, MSN, ANP-C    Telephone: [phone]  Facsimile: [phone]    [address] [place] [address]       72 year old Female with history of COPD, CHF presents with weakness. Nephrology consulted for elevated Scr.    1) MISAEL: likely hemodynamically mediated in setting of diuresis/volume overload. Scr improving on bumex gtt with azotemia in part due to steroids. UA bland. FeNa low. Bladder scan negative. Avoid nephrotoxins.    2) CKD-3b: Baseline Scr 1.6-1.8 thought to be secondary to DM. Outpatient CKD work up. Monitor electrolytes.    3) HTN with CKD: BP uncontrolled for which Hydralazine increased to 50 mg PO TID. Monitor BP.    4) LE edema: Improving on bumex gtt @ 1 mg/hour. Continue with gtt as patient remains volume overloaded. Prior TTE with mild to moderate global LVSD. Monitor UO.    5) Hypercalcemia: likely primary HPT exacerbated in setting of diuresis. Check iPTH. Monitor serum calcium.      [facility] NEPHROLOGY  Rodrigue Amador M.D.  Rob Perez D.O.  Ana Song M.D.  Lucrecia López, MSN, ANP-C    Telephone: [phone]  Facsimile: [phone]    [address] [place] [address]

## 2023-01-19 LAB
ANION GAP SERPL CALC-SCNC: 12 MMOL/L — SIGNIFICANT CHANGE UP (ref 7–14)
APTT BLD: 54.2 SEC — HIGH (ref 27–36.3)
BASOPHILS # BLD AUTO: 0.01 K/UL — SIGNIFICANT CHANGE UP (ref 0–0.2)
BASOPHILS NFR BLD AUTO: 0.1 % — SIGNIFICANT CHANGE UP (ref 0–2)
BUN SERPL-MCNC: 127 MG/DL — HIGH (ref 7–23)
CALCIUM SERPL-MCNC: 10.5 MG/DL — SIGNIFICANT CHANGE UP (ref 8.4–10.5)
CHLORIDE SERPL-SCNC: 89 MMOL/L — LOW (ref 98–107)
CO2 SERPL-SCNC: 36 MMOL/L — HIGH (ref 22–31)
CREAT SERPL-MCNC: 2.19 MG/DL — HIGH (ref 0.5–1.3)
EGFR: 23 ML/MIN/1.73M2 — LOW
EOSINOPHIL # BLD AUTO: 0.02 K/UL — SIGNIFICANT CHANGE UP (ref 0–0.5)
EOSINOPHIL NFR BLD AUTO: 0.2 % — SIGNIFICANT CHANGE UP (ref 0–6)
GLUCOSE BLDC GLUCOMTR-MCNC: 116 MG/DL — HIGH (ref 70–99)
GLUCOSE BLDC GLUCOMTR-MCNC: 153 MG/DL — HIGH (ref 70–99)
GLUCOSE BLDC GLUCOMTR-MCNC: 175 MG/DL — HIGH (ref 70–99)
GLUCOSE BLDC GLUCOMTR-MCNC: 181 MG/DL — HIGH (ref 70–99)
GLUCOSE BLDC GLUCOMTR-MCNC: 253 MG/DL — HIGH (ref 70–99)
GLUCOSE SERPL-MCNC: 99 MG/DL — SIGNIFICANT CHANGE UP (ref 70–99)
HCT VFR BLD CALC: 40.6 % — SIGNIFICANT CHANGE UP (ref 34.5–45)
HCT VFR BLD CALC: 41.6 % — SIGNIFICANT CHANGE UP (ref 34.5–45)
HGB BLD-MCNC: 12.5 G/DL — SIGNIFICANT CHANGE UP (ref 11.5–15.5)
HGB BLD-MCNC: 13.1 G/DL — SIGNIFICANT CHANGE UP (ref 11.5–15.5)
IANC: 9.17 K/UL — HIGH (ref 1.8–7.4)
IMM GRANULOCYTES NFR BLD AUTO: 0.9 % — SIGNIFICANT CHANGE UP (ref 0–0.9)
INR BLD: 2.01 RATIO — HIGH (ref 0.88–1.16)
LYMPHOCYTES # BLD AUTO: 1.03 K/UL — SIGNIFICANT CHANGE UP (ref 1–3.3)
LYMPHOCYTES # BLD AUTO: 9.1 % — LOW (ref 13–44)
MAGNESIUM SERPL-MCNC: 2.3 MG/DL — SIGNIFICANT CHANGE UP (ref 1.6–2.6)
MCHC RBC-ENTMCNC: 29.8 PG — SIGNIFICANT CHANGE UP (ref 27–34)
MCHC RBC-ENTMCNC: 30 PG — SIGNIFICANT CHANGE UP (ref 27–34)
MCHC RBC-ENTMCNC: 30.8 GM/DL — LOW (ref 32–36)
MCHC RBC-ENTMCNC: 31.5 GM/DL — LOW (ref 32–36)
MCV RBC AUTO: 95.4 FL — SIGNIFICANT CHANGE UP (ref 80–100)
MCV RBC AUTO: 96.9 FL — SIGNIFICANT CHANGE UP (ref 80–100)
MONOCYTES # BLD AUTO: 1.03 K/UL — HIGH (ref 0–0.9)
MONOCYTES NFR BLD AUTO: 9.1 % — SIGNIFICANT CHANGE UP (ref 2–14)
NEUTROPHILS # BLD AUTO: 9.17 K/UL — HIGH (ref 1.8–7.4)
NEUTROPHILS NFR BLD AUTO: 80.6 % — HIGH (ref 43–77)
NRBC # BLD: 0 /100 WBCS — SIGNIFICANT CHANGE UP (ref 0–0)
NRBC # BLD: 0 /100 WBCS — SIGNIFICANT CHANGE UP (ref 0–0)
NRBC # FLD: 0 K/UL — SIGNIFICANT CHANGE UP (ref 0–0)
NRBC # FLD: 0 K/UL — SIGNIFICANT CHANGE UP (ref 0–0)
PHOSPHATE SERPL-MCNC: 2.8 MG/DL — SIGNIFICANT CHANGE UP (ref 2.5–4.5)
PLATELET # BLD AUTO: 233 K/UL — SIGNIFICANT CHANGE UP (ref 150–400)
PLATELET # BLD AUTO: 253 K/UL — SIGNIFICANT CHANGE UP (ref 150–400)
POTASSIUM SERPL-MCNC: 4.3 MMOL/L — SIGNIFICANT CHANGE UP (ref 3.5–5.3)
POTASSIUM SERPL-SCNC: 4.3 MMOL/L — SIGNIFICANT CHANGE UP (ref 3.5–5.3)
PROTHROM AB SERPL-ACNC: 23.5 SEC — HIGH (ref 10.5–13.4)
PTH-INTACT FLD-MCNC: 199 PG/ML — HIGH (ref 15–65)
RBC # BLD: 4.19 M/UL — SIGNIFICANT CHANGE UP (ref 3.8–5.2)
RBC # BLD: 4.36 M/UL — SIGNIFICANT CHANGE UP (ref 3.8–5.2)
RBC # FLD: 14.7 % — HIGH (ref 10.3–14.5)
RBC # FLD: 14.8 % — HIGH (ref 10.3–14.5)
SODIUM SERPL-SCNC: 137 MMOL/L — SIGNIFICANT CHANGE UP (ref 135–145)
WBC # BLD: 10.94 K/UL — HIGH (ref 3.8–10.5)
WBC # BLD: 11.36 K/UL — HIGH (ref 3.8–10.5)
WBC # FLD AUTO: 10.94 K/UL — HIGH (ref 3.8–10.5)
WBC # FLD AUTO: 11.36 K/UL — HIGH (ref 3.8–10.5)

## 2023-01-19 PROCEDURE — 71250 CT THORAX DX C-: CPT | Mod: 26

## 2023-01-19 RX ORDER — WARFARIN SODIUM 2.5 MG/1
7.5 TABLET ORAL ONCE
Refills: 0 | Status: COMPLETED | OUTPATIENT
Start: 2023-01-19 | End: 2023-01-19

## 2023-01-19 RX ORDER — HEPARIN SODIUM 5000 [USP'U]/ML
4000 INJECTION INTRAVENOUS; SUBCUTANEOUS EVERY 6 HOURS
Refills: 0 | Status: DISCONTINUED | OUTPATIENT
Start: 2023-01-19 | End: 2023-01-23

## 2023-01-19 RX ORDER — HEPARIN SODIUM 5000 [USP'U]/ML
9000 INJECTION INTRAVENOUS; SUBCUTANEOUS EVERY 6 HOURS
Refills: 0 | Status: DISCONTINUED | OUTPATIENT
Start: 2023-01-19 | End: 2023-01-23

## 2023-01-19 RX ORDER — HEPARIN SODIUM 5000 [USP'U]/ML
700 INJECTION INTRAVENOUS; SUBCUTANEOUS
Qty: 25000 | Refills: 0 | Status: DISCONTINUED | OUTPATIENT
Start: 2023-01-19 | End: 2023-01-23

## 2023-01-19 RX ADMIN — Medication 40 MILLIGRAM(S): at 06:35

## 2023-01-19 RX ADMIN — ISOSORBIDE MONONITRATE 30 MILLIGRAM(S): 60 TABLET, EXTENDED RELEASE ORAL at 12:51

## 2023-01-19 RX ADMIN — HEPARIN SODIUM 900 UNIT(S)/HR: 5000 INJECTION INTRAVENOUS; SUBCUTANEOUS at 19:32

## 2023-01-19 RX ADMIN — WARFARIN SODIUM 7.5 MILLIGRAM(S): 2.5 TABLET ORAL at 18:31

## 2023-01-19 RX ADMIN — HEPARIN SODIUM 700 UNIT(S)/HR: 5000 INJECTION INTRAVENOUS; SUBCUTANEOUS at 19:11

## 2023-01-19 RX ADMIN — LIDOCAINE 1 PATCH: 4 CREAM TOPICAL at 19:29

## 2023-01-19 RX ADMIN — AMIODARONE HYDROCHLORIDE 200 MILLIGRAM(S): 400 TABLET ORAL at 06:32

## 2023-01-19 RX ADMIN — LIDOCAINE 1 PATCH: 4 CREAM TOPICAL at 23:55

## 2023-01-19 RX ADMIN — Medication 50 MILLIGRAM(S): at 06:34

## 2023-01-19 RX ADMIN — SIMVASTATIN 10 MILLIGRAM(S): 20 TABLET, FILM COATED ORAL at 21:31

## 2023-01-19 RX ADMIN — Medication 10 UNIT(S): at 12:36

## 2023-01-19 RX ADMIN — Medication 2: at 18:24

## 2023-01-19 RX ADMIN — Medication 3 MILLILITER(S): at 03:23

## 2023-01-19 RX ADMIN — HEPARIN SODIUM 700 UNIT(S)/HR: 5000 INJECTION INTRAVENOUS; SUBCUTANEOUS at 10:21

## 2023-01-19 RX ADMIN — Medication 325 MILLIGRAM(S): at 12:51

## 2023-01-19 RX ADMIN — LIDOCAINE 1 PATCH: 4 CREAM TOPICAL at 12:50

## 2023-01-19 RX ADMIN — HEPARIN SODIUM 900 UNIT(S)/HR: 5000 INJECTION INTRAVENOUS; SUBCUTANEOUS at 19:36

## 2023-01-19 RX ADMIN — BUDESONIDE AND FORMOTEROL FUMARATE DIHYDRATE 2 PUFF(S): 160; 4.5 AEROSOL RESPIRATORY (INHALATION) at 21:32

## 2023-01-19 RX ADMIN — Medication 3 MILLILITER(S): at 20:14

## 2023-01-19 RX ADMIN — BUDESONIDE AND FORMOTEROL FUMARATE DIHYDRATE 2 PUFF(S): 160; 4.5 AEROSOL RESPIRATORY (INHALATION) at 09:33

## 2023-01-19 RX ADMIN — Medication 50 MILLIGRAM(S): at 21:31

## 2023-01-19 RX ADMIN — Medication 2: at 09:32

## 2023-01-19 RX ADMIN — Medication 10 UNIT(S): at 18:24

## 2023-01-19 RX ADMIN — Medication 50 MILLIGRAM(S): at 13:41

## 2023-01-19 RX ADMIN — PANTOPRAZOLE SODIUM 40 MILLIGRAM(S): 20 TABLET, DELAYED RELEASE ORAL at 06:32

## 2023-01-19 RX ADMIN — Medication 6: at 12:35

## 2023-01-19 RX ADMIN — INSULIN GLARGINE 25 UNIT(S): 100 INJECTION, SOLUTION SUBCUTANEOUS at 21:31

## 2023-01-19 RX ADMIN — LIDOCAINE 1 PATCH: 4 CREAM TOPICAL at 23:54

## 2023-01-19 RX ADMIN — BUMETANIDE 5 MG/HR: 0.25 INJECTION INTRAMUSCULAR; INTRAVENOUS at 04:42

## 2023-01-19 RX ADMIN — HEPARIN SODIUM 700 UNIT(S)/HR: 5000 INJECTION INTRAVENOUS; SUBCUTANEOUS at 19:02

## 2023-01-19 RX ADMIN — Medication 10 UNIT(S): at 09:32

## 2023-01-19 RX ADMIN — Medication 3 MILLILITER(S): at 09:44

## 2023-01-19 NOTE — PROGRESS NOTE ADULT - SUBJECTIVE AND OBJECTIVE BOX
PATIENT SEEN AND EXAMINED ON :-1/19/23  DATE OF SERVICE:   1/19/23          Interim events noted,Labs ,Radiological studies and Cardiology tests reviewed        HOSPITAL COURSE: HPI:  72F with PMH COPD/MIGUEL/OHS (home BiPAP/ 3L NC), pHTN, MVR on Coumadin AF s/p ablation and recent DCCV on 11/4/22 pw SOB. Patient reports she has been feeling progressive SOB over the past 1 week, associated with orthopniea and intermittent CP. Per patient CP has been chronic and no change in intensity. She received treatment and steroids in ED which help with her symptoms. denied fever, chill, coughing, abd pain, worsening LE edema or diarrhea.   of note, patient endorsed she has not taking warfarin for the past 3 days due to nose bleed.  (13 Jan 2023 11:48)      INTERIM EVENTS:Patient seen at bedside ,interim events noted.      PMH -reviewed admission note, no change since admission  HEART FAILURE: Acute[ ]Chronic[ ] Systolic[ ] Diastolic[ ] Combined Systolic and Diastolic[ ]  CAD[ ] CABG[ ] PCI[ ]  DEVICES[ ] PPM[ ] ICD[ ] ILR[ ]  ATRIAL FIBRILLATION[ ] Paroxysmal[ ] Permanent[ ] CHADS2-[  ]  MISAEL[ ] CKD1[ ] CKD2[ ] CKD3[ ] CKD4[ ] ESRD[ ]  COPD[ ] HTN[ ]   DM[ ] Type1[ ] Type 2[ ]   CVA[ ] Paresis[ ]    AMBULATION: Assisted[ ] Cane/walker[ ] Independent[ ]    MEDICATIONS  (STANDING):  acetaminophen   IVPB .. 1000 milliGRAM(s) IV Intermittent once  albuterol/ipratropium for Nebulization 3 milliLiter(s) Nebulizer every 6 hours  aMIOdarone    Tablet 200 milliGRAM(s) Oral daily  budesonide 160 MICROgram(s)/formoterol 4.5 MICROgram(s) Inhaler 2 Puff(s) Inhalation two times a day  dextrose 5%. 1000 milliLiter(s) (50 mL/Hr) IV Continuous <Continuous>  dextrose 5%. 1000 milliLiter(s) (100 mL/Hr) IV Continuous <Continuous>  dextrose 50% Injectable 25 Gram(s) IV Push once  dextrose 50% Injectable 12.5 Gram(s) IV Push once  dextrose 50% Injectable 25 Gram(s) IV Push once  ferrous    sulfate 325 milliGRAM(s) Oral daily  glucagon  Injectable 1 milliGRAM(s) IntraMuscular once  heparin  Infusion. 700 Unit(s)/Hr (7 mL/Hr) IV Continuous <Continuous>  hydrALAZINE 50 milliGRAM(s) Oral three times a day  insulin glargine Injectable (LANTUS) 25 Unit(s) SubCutaneous at bedtime  insulin lispro (ADMELOG) corrective regimen sliding scale   SubCutaneous three times a day before meals  insulin lispro (ADMELOG) corrective regimen sliding scale   SubCutaneous at bedtime  insulin lispro Injectable (ADMELOG) 10 Unit(s) SubCutaneous three times a day before meals  isosorbide   mononitrate ER Tablet (IMDUR) 30 milliGRAM(s) Oral daily  lidocaine   4% Patch 1 Patch Transdermal daily  lidocaine   4% Patch 1 Patch Transdermal daily  pantoprazole    Tablet 40 milliGRAM(s) Oral before breakfast  predniSONE   Tablet 40 milliGRAM(s) Oral daily  simvastatin 10 milliGRAM(s) Oral at bedtime    MEDICATIONS  (PRN):  acetaminophen     Tablet .. 650 milliGRAM(s) Oral every 6 hours PRN Temp greater or equal to 38C (100.4F), Mild Pain (1 - 3), Moderate Pain (4 - 6)  aluminum hydroxide/magnesium hydroxide/simethicone Suspension 30 milliLiter(s) Oral every 4 hours PRN Dyspepsia  dextrose Oral Gel 15 Gram(s) Oral once PRN Blood Glucose LESS THAN 70 milliGRAM(s)/deciliter  heparin   Injectable 9000 Unit(s) IV Push every 6 hours PRN For aPTT less than 40  heparin   Injectable 4000 Unit(s) IV Push every 6 hours PRN For aPTT between 40 - 57  sodium chloride 0.65% Nasal 1 Spray(s) Both Nostrils three times a day PRN Nasal Congestion            REVIEW OF SYSTEMS:  Constitutional: [ ] fever, [ ]weight loss,  [ ]fatigue [ ]weight gain  Eyes: [ ] visual changes  Respiratory: [ ]shortness of breath;  [ ] cough, [ ]wheezing, [ ]chills, [ ]hemoptysis  Cardiovascular: [ ] chest pain, [ ]palpitations, [ ]dizziness,  [ ]leg swelling[ ]orthopnea[ ]PND  Gastrointestinal: [ ] abdominal pain, [ ]nausea, [ ]vomiting,  [ ]diarrhea [ ]Constipation [ ]Melena  Genitourinary: [ ] dysuria, [ ] hematuria [ ]Junior  Neurologic: [ ] headaches [ ] tremors[ ]weakness [ ]Paralysis Right[ ] Left[ ]  Skin: [ ] itching, [ ]burning, [ ] rashes  Endocrine: [ ] heat or cold intolerance  Musculoskeletal: [ ] joint pain or swelling; [ ] muscle, back, or extremity pain  Psychiatric: [ ] depression, [ ]anxiety, [ ]mood swings, or [ ]difficulty sleeping  Hematologic: [ ] easy bruising, [ ] bleeding gums    [ ] All remaining systems negative except as per above.   [ ]Unable to obtain.  [x] No change in ROS since admission      Vital Signs Last 24 Hrs  T(C): 36.8 (19 Jan 2023 13:35), Max: 36.8 (19 Jan 2023 13:35)  T(F): 98.3 (19 Jan 2023 13:35), Max: 98.3 (19 Jan 2023 13:35)  HR: 92 (19 Jan 2023 13:35) (78 - 92)  BP: 153/67 (19 Jan 2023 13:35) (119/63 - 154/89)  BP(mean): --  RR: 18 (19 Jan 2023 13:35) (18 - 18)  SpO2: 100% (19 Jan 2023 13:35) (95% - 100%)    Parameters below as of 19 Jan 2023 13:35  Patient On (Oxygen Delivery Method): nasal cannula,2  O2 Flow (L/min): 2    I&O's Summary    18 Jan 2023 07:01  -  19 Jan 2023 07:00  --------------------------------------------------------  IN: 875 mL / OUT: 2650 mL / NET: -1775 mL    19 Jan 2023 07:01  -  19 Jan 2023 21:26  --------------------------------------------------------  IN: 0 mL / OUT: 1825 mL / NET: -1825 mL        PHYSICAL EXAM:  General: No acute distress BMI-  HEENT: EOMI, PERRL  Neck: Supple, [ ] JVD  Lungs: Equal air entry bilaterally; [ ] rales [ ] wheezing [ ] rhonchi  Heart: Regular rate and rhythm; [x ] murmur   2/6 [ x] systolic [ ] diastolic [ ] radiation[ ] rubs [ ]  gallops  Abdomen: Nontender, bowel sounds present  Extremities: No clubbing, cyanosis, [ ] edema [ ]Pulses  equal and intact  Nervous system:  Alert & Oriented X3, no focal deficits  Psychiatric: Normal affect  Skin: No rashes or lesions    LABS:  01-19    137  |  89<L>  |  127<H>  ----------------------------<  99  4.3   |  36<H>  |  2.19<H>    Ca    10.5      19 Jan 2023 05:50  Phos  2.8     01-19  Mg     2.30     01-19      Creatinine Trend: 2.19<--, 1.92<--, 2.18<--, 2.03<--, 1.86<--, 1.78<--                        13.1   10.94 )-----------( 233      ( 19 Jan 2023 15:30 )             41.6     PT/INR - ( 19 Jan 2023 05:50 )   PT: 23.5 sec;   INR: 2.01 ratio         PTT - ( 19 Jan 2023 17:59 )  PTT:54.2 sec

## 2023-01-19 NOTE — PHYSICAL THERAPY INITIAL EVALUATION ADULT - PRECAUTIONS/LIMITATIONS, REHAB EVAL
2L/min via NC/cardiac precautions/fall precautions/obesity precautions/oxygen therapy device and L/min

## 2023-01-19 NOTE — PHYSICAL THERAPY INITIAL EVALUATION ADULT - NSPTDISCHREC_GEN_A_CORE
Anticipated discharge to home with home PT services to improve functional mobility and strength, to optimize safety within the home environment, and to allow pt to return to prior level of function. Please follow PT progress notes for updates.

## 2023-01-19 NOTE — PHYSICAL THERAPY INITIAL EVALUATION ADULT - GENERAL OBSERVATIONS, REHAB EVAL
Pt found in bed; patient agreeable to PT; +02; +telemetry monitor; morbidly obese; daughter at bedside; +bilateral pedal edema.

## 2023-01-19 NOTE — PROGRESS NOTE ADULT - ASSESSMENT
72F with h/o COPD, pHTN, mech MVR on coumadin, Afib/flutter, MIGUEL presented with worsening SOB from baseline. On admission concern for COPD exacerbation. CXR is concerning for CHF. admit for management.       #  COPD exacerbation.   COPD exacerbation - 2L NC SpO2 91%  - s/p IV solumedrol, switched to PO 1/18   - BiPAP ordered as per pulm  - Monitor FS while on steroids    # Afib/mechanical valve - Goal INR 2.5-3.5  - Subtherapeutic INR on admission because held coumadin for 3 days prior 2/2 epistaxis  - s/p Heparin gtt to coumadin bridge  - Coumadin 5mg dosed 1/18, INR 2.7    # Diastolic CHF, acute on chronic.   trend 2.0-3.0 for most medical and surgical thromboembolic states.  2.0-3.0 for atrial fibrillation.  2.0-3.0 for bileaflet mechanical valve in aortic position.  2.5-3.5 for mechanical heart valves.  Chest 2004:[id number].  The presence of direct thrombin inhibitors(argatroban,refludan may falsely increase results,ratio.bumex, now on Bumex gtt (cxr with increased vascular congestion)   - strict I&O - patient using commode     # Afib.   h/o Afib and Aflutter s/p ablation and recent DCCV.   c/w Amiodarone.   c/w warfarin for AC.  trend INR    # HTN - Hydralazine

## 2023-01-19 NOTE — PROGRESS NOTE ADULT - SUBJECTIVE AND OBJECTIVE BOX
SUBJECTIVE / OVERNIGHT EVENTS:pt seen and examined  23     MEDICATIONS  (STANDING):  acetaminophen   IVPB .. 1000 milliGRAM(s) IV Intermittent once  albuterol/ipratropium for Nebulization 3 milliLiter(s) Nebulizer every 6 hours  aMIOdarone    Tablet 200 milliGRAM(s) Oral daily  budesonide 160 MICROgram(s)/formoterol 4.5 MICROgram(s) Inhaler 2 Puff(s) Inhalation two times a day  dextrose 5%. 1000 milliLiter(s) (50 mL/Hr) IV Continuous <Continuous>  dextrose 5%. 1000 milliLiter(s) (100 mL/Hr) IV Continuous <Continuous>  dextrose 50% Injectable 25 Gram(s) IV Push once  dextrose 50% Injectable 12.5 Gram(s) IV Push once  dextrose 50% Injectable 25 Gram(s) IV Push once  ferrous    sulfate 325 milliGRAM(s) Oral daily  glucagon  Injectable 1 milliGRAM(s) IntraMuscular once  heparin  Infusion. 700 Unit(s)/Hr (7 mL/Hr) IV Continuous <Continuous>  hydrALAZINE 50 milliGRAM(s) Oral three times a day  insulin glargine Injectable (LANTUS) 25 Unit(s) SubCutaneous at bedtime  insulin lispro (ADMELOG) corrective regimen sliding scale   SubCutaneous three times a day before meals  insulin lispro (ADMELOG) corrective regimen sliding scale   SubCutaneous at bedtime  insulin lispro Injectable (ADMELOG) 10 Unit(s) SubCutaneous three times a day before meals  isosorbide   mononitrate ER Tablet (IMDUR) 30 milliGRAM(s) Oral daily  lidocaine   4% Patch 1 Patch Transdermal daily  lidocaine   4% Patch 1 Patch Transdermal daily  pantoprazole    Tablet 40 milliGRAM(s) Oral before breakfast  predniSONE   Tablet 40 milliGRAM(s) Oral daily  simvastatin 10 milliGRAM(s) Oral at bedtime    MEDICATIONS  (PRN):  acetaminophen     Tablet .. 650 milliGRAM(s) Oral every 6 hours PRN Temp greater or equal to 38C (100.4F), Mild Pain (1 - 3), Moderate Pain (4 - 6)  aluminum hydroxide/magnesium hydroxide/simethicone Suspension 30 milliLiter(s) Oral every 4 hours PRN Dyspepsia  dextrose Oral Gel 15 Gram(s) Oral once PRN Blood Glucose LESS THAN 70 milliGRAM(s)/deciliter  heparin   Injectable 9000 Unit(s) IV Push every 6 hours PRN For aPTT less than 40  heparin   Injectable 4000 Unit(s) IV Push every 6 hours PRN For aPTT between 40 - 57  sodium chloride 0.65% Nasal 1 Spray(s) Both Nostrils three times a day PRN Nasal Congestion    Vital Signs Last 24 Hrs  T(C): 36.8 (23 @ 13:35), Max: 36.8 (23 @ 13:35)  T(F): 98.3 (23 @ 13:35), Max: 98.3 (23 @ 13:35)  HR: 92 (23 @ 13:35) (78 - 92)  BP: 153/67 (23 @ 13:35) (119/63 - 154/89)  BP(mean): --  RR: 18 (23 @ 13:35) (18 - 18)  SpO2: 100% (23 @ 13:35) (95% - 100%)      Constitutional: No fever, fatigue  Skin: No rash.  Eyes: No recent vision problems or eye pain.  ENT: No congestion, ear pain, or sore throat.  Cardiovascular: No chest pain or palpation.  Respiratory: No cough, shortness of breath, congestion, or wheezing.  Gastrointestinal: No abdominal pain, nausea, vomiting, or diarrhea.  Genitourinary: No dysuria.  Musculoskeletal: No joint swelling.  Neurologic: No headache.    PHYSICAL EXAM:  GENERAL: NAD  EYES: EOMI, PERRLA  NECK: Supple, No JVD  CHEST/LUNG: dec breath sounds at bases  HEART:  S1 , S2 +  ABDOMEN: soft, bs+  EXTREMITIES:  edema+  NEUROLOGY:alert awake oriented     LABS:      137  |  89<L>  |  127<H>  ----------------------------<  99  4.3   |  36<H>  |  2.19<H>    Ca    10.5      2023 05:50  Phos  2.8       Mg     2.30           Creatinine Trend: 2.19 <--, 1.92 <--, 2.18 <--, 2.03 <--, 1.86 <--, 1.78 <--, 1.81 <--, 1.56 <--, 1.65 <--                        13.1   10.94 )-----------( 233      ( 2023 15:30 )             41.6     Urine Studies:  Urinalysis Basic - ( 2023 17:54 )    Color: Colorless / Appearance: Clear / S.011 / pH:   Gluc:  / Ketone: Negative  / Bili: Negative / Urobili: <2 mg/dL   Blood:  / Protein: Negative / Nitrite: Negative   Leuk Esterase: Negative / RBC: 1 /HPF / WBC 1 /HPF   Sq Epi:  / Non Sq Epi:  / Bacteria: Negative      Creatinine, Random Urine: 17 mg/dL ( @ 17:54)            PT/INR - ( 2023 05:50 )   PT: 23.5 sec;   INR: 2.01 ratio         PTT - ( 2023 17:59 )  PTT:54.2 sec      RADIOLOGY & ADDITIONAL TESTS:    Imaging Personally Reviewed:yes    Consultant(s) Notes Reviewed:  yes    Care Discussed with Consultants/Other Providers:yes

## 2023-01-19 NOTE — PHYSICAL THERAPY INITIAL EVALUATION ADULT - ADDITIONAL COMMENTS
Pt owns a rolling walker, rollator, cane, shower chair, commode,+02. Pt has 4 steps with unilateral railing. No steps to use inside.

## 2023-01-19 NOTE — PHYSICAL THERAPY INITIAL EVALUATION ADULT - PERTINENT HX OF CURRENT PROBLEM, REHAB EVAL
72 year old female with PMH COPD/MIGUEL/OHS (home BiPAP/ 3L NC), pHTN, MVR on Coumadin AF s/p ablation and recent DCCV on 11/4/22 pw SOB. Patient reports she has been feeling progressive SOB over the past 1 week, associated with orthopniea and intermittent CP. Per patient CP has been chronic and no change in intensity. She received treatment and steroids in ED which help with her symptoms.

## 2023-01-19 NOTE — PROGRESS NOTE ADULT - SUBJECTIVE AND OBJECTIVE BOX
[facility] NEPHROLOGY- PROGRESS NOTE    72 year old Female with history of COPD, CHF presents with weakness. Nephrology consulted for elevated Scr.    REVIEW OF SYSTEMS:  Gen: no fevers  Cards: no chest pain  Resp: + dyspnea  GI: no nausea or vomiting or diarrhea  Vascular: + LE edema    No Known Allergies      Hospital Medications: Medications reviewed      VITALS:  T(F): 97.4 (23 @ 06:25), Max: 98.2 (23 @ 12:06)  HR: 78 (23 @ 06:25)  BP: 119/63 (23 @ 06:25)  RR: 18 (23 @ 06:25)  SpO2: 100% (23 @ 06:25)  Wt(kg): --     @ 07:01  -   @ 07:00  --------------------------------------------------------  IN: 875 mL / OUT: 2650 mL / NET: -1775 mL        PHYSICAL EXAM:    Gen: NAD, calm  Cards: RRR, +S1/S2, no M/G/R  Resp: CTA B/L  GI: soft, NT/ND, NABS  Vascular: 2+ RLE edema > 1+ LLE edema      LABS:      137  |  89<L>  |  127<H>  ----------------------------<  99  4.3   |  36<H>  |  2.19<H>    Ca    10.5      2023 05:50  Phos  2.8       Mg     2.30           Creatinine Trend: 2.19 <--, 1.92 <--, 2.18 <--, 2.03 <--, 1.86 <--, 1.78 <--, 1.81 <--, 1.56 <--, 1.65 <--                        12.5   11.36 )-----------( 253      ( 2023 05:50 )             40.6     Urine Studies:  Urinalysis Basic - ( 2023 17:54 )    Color: Colorless / Appearance: Clear / S.011 / pH:   Gluc:  / Ketone: Negative  / Bili: Negative / Urobili: <2 mg/dL   Blood:  / Protein: Negative / Nitrite: Negative   Leuk Esterase: Negative / RBC: 1 /HPF / WBC 1 /HPF   Sq Epi:  / Non Sq Epi:  / Bacteria: Negative      Creatinine, Random Urine: 17 mg/dL ( @ 17:54)

## 2023-01-19 NOTE — PROGRESS NOTE ADULT - ASSESSMENT
72 year old Female with history of COPD, CHF presents with weakness. Nephrology consulted for elevated Scr.    1) MISAEL: likely hemodynamically mediated in setting of diuresis/volume overload. Scr increased today on bumex gtt with azotemia in part due to steroids. Would check CT chest without contrast to better assess volume status. If CT chest negative, will de-escalate diuretics. UA bland. FeNa low. Bladder scan negative. Avoid nephrotoxins.    2) CKD-3b: Baseline Scr 1.6-1.8 thought to be secondary to DM. Outpatient CKD work up. Monitor electrolytes.    3) HTN with CKD: BP improving. Continue with current medications. Monitor BP.    4) LE edema: Improving on bumex gtt @ 1 mg/hour with good UO (2.6L). Continue with gtt as above pending CT chest. Prior TTE with mild to moderate global LVSD. Check AM blood gas r/o contraction alkalosis. Monitor UO.    5) Hypercalcemia: likely primary HPT exacerbated in setting of diuresis. Will add sensipar if hypercalcemia recurs. Monitor serum calcium.      [facility] NEPHROLOGY  Rodrigue Amador M.D.  Rob Perez D.O.  Ana Song M.D.  Lucrecia López, MSN, ANP-C    Telephone: [phone]  Facsimile: [phone]    [address] [place] [address]

## 2023-01-20 ENCOUNTER — TRANSCRIPTION ENCOUNTER (OUTPATIENT)
Age: 73
End: 2023-01-20

## 2023-01-20 LAB
ANION GAP SERPL CALC-SCNC: 12 MMOL/L — SIGNIFICANT CHANGE UP (ref 7–14)
APTT BLD: 110.2 SEC — SIGNIFICANT CHANGE UP (ref 27–36.3)
APTT BLD: 39.3 SEC — HIGH (ref 27–36.3)
APTT BLD: >200 SEC — SIGNIFICANT CHANGE UP (ref 27–36.3)
APTT BLD: >200 SEC — SIGNIFICANT CHANGE UP (ref 27–36.3)
BASOPHILS # BLD AUTO: 0.01 K/UL — SIGNIFICANT CHANGE UP (ref 0–0.2)
BASOPHILS NFR BLD AUTO: 0.1 % — SIGNIFICANT CHANGE UP (ref 0–2)
BUN SERPL-MCNC: 137 MG/DL — HIGH (ref 7–23)
CALCIUM SERPL-MCNC: 10.2 MG/DL — SIGNIFICANT CHANGE UP (ref 8.4–10.5)
CHLORIDE SERPL-SCNC: 89 MMOL/L — LOW (ref 98–107)
CO2 SERPL-SCNC: 35 MMOL/L — HIGH (ref 22–31)
CREAT SERPL-MCNC: 2.37 MG/DL — HIGH (ref 0.5–1.3)
EGFR: 21 ML/MIN/1.73M2 — LOW
EOSINOPHIL # BLD AUTO: 0.06 K/UL — SIGNIFICANT CHANGE UP (ref 0–0.5)
EOSINOPHIL NFR BLD AUTO: 0.6 % — SIGNIFICANT CHANGE UP (ref 0–6)
GLUCOSE BLDC GLUCOMTR-MCNC: 124 MG/DL — HIGH (ref 70–99)
GLUCOSE BLDC GLUCOMTR-MCNC: 128 MG/DL — HIGH (ref 70–99)
GLUCOSE BLDC GLUCOMTR-MCNC: 158 MG/DL — HIGH (ref 70–99)
GLUCOSE BLDC GLUCOMTR-MCNC: 262 MG/DL — HIGH (ref 70–99)
GLUCOSE SERPL-MCNC: 136 MG/DL — HIGH (ref 70–99)
HCT VFR BLD CALC: 38.5 % — SIGNIFICANT CHANGE UP (ref 34.5–45)
HGB BLD-MCNC: 12.1 G/DL — SIGNIFICANT CHANGE UP (ref 11.5–15.5)
IANC: 7.98 K/UL — HIGH (ref 1.8–7.4)
IMM GRANULOCYTES NFR BLD AUTO: 0.8 % — SIGNIFICANT CHANGE UP (ref 0–0.9)
INR BLD: 2.4 RATIO — HIGH (ref 0.88–1.16)
LYMPHOCYTES # BLD AUTO: 1.08 K/UL — SIGNIFICANT CHANGE UP (ref 1–3.3)
LYMPHOCYTES # BLD AUTO: 10.7 % — LOW (ref 13–44)
MAGNESIUM SERPL-MCNC: 2.6 MG/DL — SIGNIFICANT CHANGE UP (ref 1.6–2.6)
MCHC RBC-ENTMCNC: 30.2 PG — SIGNIFICANT CHANGE UP (ref 27–34)
MCHC RBC-ENTMCNC: 31.4 GM/DL — LOW (ref 32–36)
MCV RBC AUTO: 96 FL — SIGNIFICANT CHANGE UP (ref 80–100)
MONOCYTES # BLD AUTO: 0.93 K/UL — HIGH (ref 0–0.9)
MONOCYTES NFR BLD AUTO: 9.2 % — SIGNIFICANT CHANGE UP (ref 2–14)
NEUTROPHILS # BLD AUTO: 7.98 K/UL — HIGH (ref 1.8–7.4)
NEUTROPHILS NFR BLD AUTO: 78.6 % — HIGH (ref 43–77)
NRBC # BLD: 0 /100 WBCS — SIGNIFICANT CHANGE UP (ref 0–0)
NRBC # FLD: 0 K/UL — SIGNIFICANT CHANGE UP (ref 0–0)
PHOSPHATE SERPL-MCNC: 3.7 MG/DL — SIGNIFICANT CHANGE UP (ref 2.5–4.5)
PLATELET # BLD AUTO: 230 K/UL — SIGNIFICANT CHANGE UP (ref 150–400)
POTASSIUM SERPL-MCNC: 4.3 MMOL/L — SIGNIFICANT CHANGE UP (ref 3.5–5.3)
POTASSIUM SERPL-SCNC: 4.3 MMOL/L — SIGNIFICANT CHANGE UP (ref 3.5–5.3)
PROTHROM AB SERPL-ACNC: 28.1 SEC — HIGH (ref 10.5–13.4)
RBC # BLD: 4.01 M/UL — SIGNIFICANT CHANGE UP (ref 3.8–5.2)
RBC # FLD: 14.9 % — HIGH (ref 10.3–14.5)
SODIUM SERPL-SCNC: 136 MMOL/L — SIGNIFICANT CHANGE UP (ref 135–145)
WBC # BLD: 10.14 K/UL — SIGNIFICANT CHANGE UP (ref 3.8–10.5)
WBC # FLD AUTO: 10.14 K/UL — SIGNIFICANT CHANGE UP (ref 3.8–10.5)

## 2023-01-20 RX ORDER — BUMETANIDE 0.25 MG/ML
2 INJECTION INTRAMUSCULAR; INTRAVENOUS DAILY
Refills: 0 | Status: DISCONTINUED | OUTPATIENT
Start: 2023-01-20 | End: 2023-01-21

## 2023-01-20 RX ORDER — WARFARIN SODIUM 2.5 MG/1
7.5 TABLET ORAL ONCE
Refills: 0 | Status: COMPLETED | OUTPATIENT
Start: 2023-01-20 | End: 2023-01-20

## 2023-01-20 RX ADMIN — BUMETANIDE 2 MILLIGRAM(S): 0.25 INJECTION INTRAMUSCULAR; INTRAVENOUS at 14:53

## 2023-01-20 RX ADMIN — WARFARIN SODIUM 7.5 MILLIGRAM(S): 2.5 TABLET ORAL at 21:54

## 2023-01-20 RX ADMIN — Medication 325 MILLIGRAM(S): at 12:54

## 2023-01-20 RX ADMIN — Medication 3 MILLILITER(S): at 15:10

## 2023-01-20 RX ADMIN — LIDOCAINE 1 PATCH: 4 CREAM TOPICAL at 19:00

## 2023-01-20 RX ADMIN — Medication 40 MILLIGRAM(S): at 05:28

## 2023-01-20 RX ADMIN — Medication 6: at 12:51

## 2023-01-20 RX ADMIN — INSULIN GLARGINE 25 UNIT(S): 100 INJECTION, SOLUTION SUBCUTANEOUS at 21:55

## 2023-01-20 RX ADMIN — HEPARIN SODIUM 0 UNIT(S)/HR: 5000 INJECTION INTRAVENOUS; SUBCUTANEOUS at 19:23

## 2023-01-20 RX ADMIN — Medication 50 MILLIGRAM(S): at 05:29

## 2023-01-20 RX ADMIN — Medication 10 UNIT(S): at 18:09

## 2023-01-20 RX ADMIN — Medication 10 UNIT(S): at 12:52

## 2023-01-20 RX ADMIN — LIDOCAINE 1 PATCH: 4 CREAM TOPICAL at 12:53

## 2023-01-20 RX ADMIN — HEPARIN SODIUM 700 UNIT(S)/HR: 5000 INJECTION INTRAVENOUS; SUBCUTANEOUS at 01:54

## 2023-01-20 RX ADMIN — LIDOCAINE 1 PATCH: 4 CREAM TOPICAL at 12:55

## 2023-01-20 RX ADMIN — HEPARIN SODIUM 700 UNIT(S)/HR: 5000 INJECTION INTRAVENOUS; SUBCUTANEOUS at 07:20

## 2023-01-20 RX ADMIN — Medication 50 MILLIGRAM(S): at 12:54

## 2023-01-20 RX ADMIN — Medication 3 MILLILITER(S): at 10:00

## 2023-01-20 RX ADMIN — Medication 50 MILLIGRAM(S): at 21:46

## 2023-01-20 RX ADMIN — BUDESONIDE AND FORMOTEROL FUMARATE DIHYDRATE 2 PUFF(S): 160; 4.5 AEROSOL RESPIRATORY (INHALATION) at 08:32

## 2023-01-20 RX ADMIN — BUDESONIDE AND FORMOTEROL FUMARATE DIHYDRATE 2 PUFF(S): 160; 4.5 AEROSOL RESPIRATORY (INHALATION) at 21:48

## 2023-01-20 RX ADMIN — Medication 10 UNIT(S): at 08:31

## 2023-01-20 RX ADMIN — HEPARIN SODIUM 9000 UNIT(S): 5000 INJECTION INTRAVENOUS; SUBCUTANEOUS at 10:49

## 2023-01-20 RX ADMIN — HEPARIN SODIUM 0 UNIT(S)/HR: 5000 INJECTION INTRAVENOUS; SUBCUTANEOUS at 18:32

## 2023-01-20 RX ADMIN — PANTOPRAZOLE SODIUM 40 MILLIGRAM(S): 20 TABLET, DELAYED RELEASE ORAL at 05:28

## 2023-01-20 RX ADMIN — ISOSORBIDE MONONITRATE 30 MILLIGRAM(S): 60 TABLET, EXTENDED RELEASE ORAL at 12:53

## 2023-01-20 RX ADMIN — Medication 3 MILLILITER(S): at 21:13

## 2023-01-20 RX ADMIN — AMIODARONE HYDROCHLORIDE 200 MILLIGRAM(S): 400 TABLET ORAL at 05:28

## 2023-01-20 RX ADMIN — HEPARIN SODIUM 900 UNIT(S)/HR: 5000 INJECTION INTRAVENOUS; SUBCUTANEOUS at 19:47

## 2023-01-20 RX ADMIN — HEPARIN SODIUM 1200 UNIT(S)/HR: 5000 INJECTION INTRAVENOUS; SUBCUTANEOUS at 10:48

## 2023-01-20 RX ADMIN — SIMVASTATIN 10 MILLIGRAM(S): 20 TABLET, FILM COATED ORAL at 21:46

## 2023-01-20 NOTE — DIETITIAN NUTRITION RISK NOTIFICATION - TREATMENT: THE FOLLOWING DIET HAS BEEN RECOMMENDED
Diet, DASH/TLC:   Sodium & Cholesterol Restricted  Consistent Carbohydrate {No Snacks} (CSTCHO)  Low Fat (LOWFAT)  1500mL Fluid Restriction ([id number])  No Concentrated Potassium (01-17-23 @ 11:16) [Active]

## 2023-01-20 NOTE — PROVIDER CONTACT NOTE (OTHER) - BACKGROUND
Patient admitted for COPD exacerbation
PMH: COPD, HTN, CHF, Pulm HTN, Afib, MVR  Pt c/o chest pressure/tightness on admission
Patient admitted for COPD with acute exacerbation

## 2023-01-20 NOTE — PROGRESS NOTE ADULT - SUBJECTIVE AND OBJECTIVE BOX
SUBJECTIVE / OVERNIGHT EVENTS:pt seen and examined  23   MEDICATIONS  (STANDING):  acetaminophen   IVPB .. 1000 milliGRAM(s) IV Intermittent once  albuterol/ipratropium for Nebulization 3 milliLiter(s) Nebulizer every 6 hours  aMIOdarone    Tablet 200 milliGRAM(s) Oral daily  budesonide 160 MICROgram(s)/formoterol 4.5 MICROgram(s) Inhaler 2 Puff(s) Inhalation two times a day  buMETAnide 2 milliGRAM(s) Oral daily  dextrose 5%. 1000 milliLiter(s) (50 mL/Hr) IV Continuous <Continuous>  dextrose 5%. 1000 milliLiter(s) (100 mL/Hr) IV Continuous <Continuous>  dextrose 50% Injectable 25 Gram(s) IV Push once  dextrose 50% Injectable 12.5 Gram(s) IV Push once  dextrose 50% Injectable 25 Gram(s) IV Push once  ferrous    sulfate 325 milliGRAM(s) Oral daily  glucagon  Injectable 1 milliGRAM(s) IntraMuscular once  heparin  Infusion. 700 Unit(s)/Hr (7 mL/Hr) IV Continuous <Continuous>  hydrALAZINE 50 milliGRAM(s) Oral three times a day  insulin glargine Injectable (LANTUS) 25 Unit(s) SubCutaneous at bedtime  insulin lispro (ADMELOG) corrective regimen sliding scale   SubCutaneous three times a day before meals  insulin lispro (ADMELOG) corrective regimen sliding scale   SubCutaneous at bedtime  insulin lispro Injectable (ADMELOG) 10 Unit(s) SubCutaneous three times a day before meals  isosorbide   mononitrate ER Tablet (IMDUR) 30 milliGRAM(s) Oral daily  lidocaine   4% Patch 1 Patch Transdermal daily  lidocaine   4% Patch 1 Patch Transdermal daily  pantoprazole    Tablet 40 milliGRAM(s) Oral before breakfast  predniSONE   Tablet 40 milliGRAM(s) Oral daily  simvastatin 10 milliGRAM(s) Oral at bedtime    MEDICATIONS  (PRN):  acetaminophen     Tablet .. 650 milliGRAM(s) Oral every 6 hours PRN Temp greater or equal to 38C (100.4F), Mild Pain (1 - 3), Moderate Pain (4 - 6)  aluminum hydroxide/magnesium hydroxide/simethicone Suspension 30 milliLiter(s) Oral every 4 hours PRN Dyspepsia  dextrose Oral Gel 15 Gram(s) Oral once PRN Blood Glucose LESS THAN 70 milliGRAM(s)/deciliter  heparin   Injectable 9000 Unit(s) IV Push every 6 hours PRN For aPTT less than 40  heparin   Injectable 4000 Unit(s) IV Push every 6 hours PRN For aPTT between 40 - 57  sodium chloride 0.65% Nasal 1 Spray(s) Both Nostrils three times a day PRN Nasal Congestion    Vital Signs Last 24 Hrs  T(C): 37 (23 @ 21:30), Max: 37 (23 @ 21:30)  T(F): 98.6 (23 @ 21:30), Max: 98.6 (23 @ 21:30)  HR: 94 (23 @ 21:30) (75 - 94)  BP: 130/57 (23 @ 21:30) (130/57 - 134/65)  BP(mean): --  RR: 20 (23 @ 21:30) (17 - 20)  SpO2: 99% (23 @ 21:30) (99% - 100%)        Constitutional: No fever, fatigue  Skin: No rash.  Eyes: No recent vision problems or eye pain.  ENT: No congestion, ear pain, or sore throat.  Cardiovascular: No chest pain or palpation.  Respiratory: No cough, shortness of breath, congestion, or wheezing.  Gastrointestinal: No abdominal pain, nausea, vomiting, or diarrhea.  Genitourinary: No dysuria.  Musculoskeletal: No joint swelling.  Neurologic: No headache.    PHYSICAL EXAM:  GENERAL: NAD  EYES: EOMI, PERRLA  NECK: Supple, No JVD  CHEST/LUNG: dec breath sounds at bases  HEART:  S1 , S2 +  ABDOMEN: soft, bs+  EXTREMITIES:  edema+  NEUROLOGY:alert awake oriented     LABS:      136  |  89<L>  |  137<H>  ----------------------------<  136<H>  4.3   |  35<H>  |  2.37<H>    Ca    10.2      2023 05:50  Phos  3.7       Mg     2.60           Creatinine Trend: 2.37 <--, 2.19 <--, 1.92 <--, 2.18 <--, 2.03 <--, 1.86 <--, 1.78 <--                        12.1   10.14 )-----------( 230      ( 2023 05:50 )             38.5     Urine Studies:  Urinalysis Basic - ( 2023 17:54 )    Color: Colorless / Appearance: Clear / S.011 / pH:   Gluc:  / Ketone: Negative  / Bili: Negative / Urobili: <2 mg/dL   Blood:  / Protein: Negative / Nitrite: Negative   Leuk Esterase: Negative / RBC: 1 /HPF / WBC 1 /HPF   Sq Epi:  / Non Sq Epi:  / Bacteria: Negative      Creatinine, Random Urine: 17 mg/dL ( @ 17:54)            PT/INR - ( 2023 05:50 )   PT: 28.1 sec;   INR: 2.40 ratio         PTT - ( 2023 17:31 )  PTT:>200 sec    Creatinine, Random Urine: 17 mg/dL ( @ 17:54)            PT/INR - ( 2023 05:50 )   PT: 23.5 sec;   INR: 2.01 ratio         PTT - ( 2023 17:59 )  PTT:54.2 sec      RADIOLOGY & ADDITIONAL TESTS:    Imaging Personally Reviewed:yes    Consultant(s) Notes Reviewed:  yes    Care Discussed with Consultants/Other Providers:yes

## 2023-01-20 NOTE — DIETITIAN INITIAL EVALUATION ADULT - NS FNS DIET ORDER
Diet, DASH/TLC:   Sodium & Cholesterol Restricted  Consistent Carbohydrate {No Snacks} (CSTCHO)  Low Fat (LOWFAT)  1500mL Fluid Restriction ([id number])  No Concentrated Potassium (01-17-23 @ 11:16)

## 2023-01-20 NOTE — DIETITIAN INITIAL EVALUATION ADULT - PERTINENT MEDS FT
MEDICATIONS  (STANDING):  acetaminophen   IVPB .. 1000 milliGRAM(s) IV Intermittent once  albuterol/ipratropium for Nebulization 3 milliLiter(s) Nebulizer every 6 hours  aMIOdarone    Tablet 200 milliGRAM(s) Oral daily  budesonide 160 MICROgram(s)/formoterol 4.5 MICROgram(s) Inhaler 2 Puff(s) Inhalation two times a day  buMETAnide 2 milliGRAM(s) Oral daily  dextrose 5%. 1000 milliLiter(s) (50 mL/Hr) IV Continuous <Continuous>  dextrose 5%. 1000 milliLiter(s) (100 mL/Hr) IV Continuous <Continuous>  dextrose 50% Injectable 25 Gram(s) IV Push once  dextrose 50% Injectable 12.5 Gram(s) IV Push once  dextrose 50% Injectable 25 Gram(s) IV Push once  ferrous    sulfate 325 milliGRAM(s) Oral daily  glucagon  Injectable 1 milliGRAM(s) IntraMuscular once  heparin  Infusion. 700 Unit(s)/Hr (7 mL/Hr) IV Continuous <Continuous>  hydrALAZINE 50 milliGRAM(s) Oral three times a day  insulin glargine Injectable (LANTUS) 25 Unit(s) SubCutaneous at bedtime  insulin lispro (ADMELOG) corrective regimen sliding scale   SubCutaneous three times a day before meals  insulin lispro (ADMELOG) corrective regimen sliding scale   SubCutaneous at bedtime  insulin lispro Injectable (ADMELOG) 10 Unit(s) SubCutaneous three times a day before meals  isosorbide   mononitrate ER Tablet (IMDUR) 30 milliGRAM(s) Oral daily  lidocaine   4% Patch 1 Patch Transdermal daily  lidocaine   4% Patch 1 Patch Transdermal daily  pantoprazole    Tablet 40 milliGRAM(s) Oral before breakfast  predniSONE   Tablet 40 milliGRAM(s) Oral daily  simvastatin 10 milliGRAM(s) Oral at bedtime    MEDICATIONS  (PRN):  acetaminophen     Tablet .. 650 milliGRAM(s) Oral every 6 hours PRN Temp greater or equal to 38C (100.4F), Mild Pain (1 - 3), Moderate Pain (4 - 6)  aluminum hydroxide/magnesium hydroxide/simethicone Suspension 30 milliLiter(s) Oral every 4 hours PRN Dyspepsia  dextrose Oral Gel 15 Gram(s) Oral once PRN Blood Glucose LESS THAN 70 milliGRAM(s)/deciliter  heparin   Injectable 9000 Unit(s) IV Push every 6 hours PRN For aPTT less than 40  heparin   Injectable 4000 Unit(s) IV Push every 6 hours PRN For aPTT between 40 - 57  sodium chloride 0.65% Nasal 1 Spray(s) Both Nostrils three times a day PRN Nasal Congestion

## 2023-01-20 NOTE — PROGRESS NOTE ADULT - SUBJECTIVE AND OBJECTIVE BOX
PATIENT SEEN AND EXAMINED ON :- 1/20/23  DATE OF SERVICE:   1/20/23          Interim events noted,Labs ,Radiological studies and Cardiology tests reviewed .       HOSPITAL COURSE: HPI:  72F with PMH COPD/MIGUEL/OHS (home BiPAP/ 3L NC), pHTN, MVR on Coumadin AF s/p ablation and recent DCCV on 11/4/22 pw SOB. Patient reports she has been feeling progressive SOB over the past 1 week, associated with orthopniea and intermittent CP. Per patient CP has been chronic and no change in intensity. She received treatment and steroids in ED which help with her symptoms. denied fever, chill, coughing, abd pain, worsening LE edema or diarrhea.   of note, patient endorsed she has not taking warfarin for the past 3 days due to nose bleed.  (13 Jan 2023 11:48)      INTERIM EVENTS:Patient seen at bedside ,interim events noted.      PMH -reviewed admission note, no change since admission  HEART FAILURE: Acute[ ]Chronic[ ] Systolic[ ] Diastolic[ ] Combined Systolic and Diastolic[ ]  CAD[ ] CABG[ ] PCI[ ]  DEVICES[ ] PPM[ ] ICD[ ] ILR[ ]  ATRIAL FIBRILLATION[ ] Paroxysmal[ ] Permanent[ ] CHADS2-[  ]  MISAEL[ ] CKD1[ ] CKD2[ ] CKD3[ ] CKD4[ ] ESRD[ ]  COPD[ ] HTN[ ]   DM[ ] Type1[ ] Type 2[ ]   CVA[ ] Paresis[ ]    AMBULATION: Assisted[ ] Cane/walker[ ] Independent[ ]    MEDICATIONS  (STANDING):  acetaminophen   IVPB .. 1000 milliGRAM(s) IV Intermittent once  albuterol/ipratropium for Nebulization 3 milliLiter(s) Nebulizer every 6 hours  aMIOdarone    Tablet 200 milliGRAM(s) Oral daily  budesonide 160 MICROgram(s)/formoterol 4.5 MICROgram(s) Inhaler 2 Puff(s) Inhalation two times a day  buMETAnide 2 milliGRAM(s) Oral daily  dextrose 5%. 1000 milliLiter(s) (100 mL/Hr) IV Continuous <Continuous>  dextrose 5%. 1000 milliLiter(s) (50 mL/Hr) IV Continuous <Continuous>  dextrose 50% Injectable 25 Gram(s) IV Push once  dextrose 50% Injectable 12.5 Gram(s) IV Push once  dextrose 50% Injectable 25 Gram(s) IV Push once  ferrous    sulfate 325 milliGRAM(s) Oral daily  glucagon  Injectable 1 milliGRAM(s) IntraMuscular once  heparin  Infusion. 700 Unit(s)/Hr (7 mL/Hr) IV Continuous <Continuous>  hydrALAZINE 50 milliGRAM(s) Oral three times a day  insulin glargine Injectable (LANTUS) 25 Unit(s) SubCutaneous at bedtime  insulin lispro (ADMELOG) corrective regimen sliding scale   SubCutaneous three times a day before meals  insulin lispro (ADMELOG) corrective regimen sliding scale   SubCutaneous at bedtime  insulin lispro Injectable (ADMELOG) 10 Unit(s) SubCutaneous three times a day before meals  isosorbide   mononitrate ER Tablet (IMDUR) 30 milliGRAM(s) Oral daily  lidocaine   4% Patch 1 Patch Transdermal daily  lidocaine   4% Patch 1 Patch Transdermal daily  pantoprazole    Tablet 40 milliGRAM(s) Oral before breakfast  predniSONE   Tablet 40 milliGRAM(s) Oral daily  simvastatin 10 milliGRAM(s) Oral at bedtime    MEDICATIONS  (PRN):  acetaminophen     Tablet .. 650 milliGRAM(s) Oral every 6 hours PRN Temp greater or equal to 38C (100.4F), Mild Pain (1 - 3), Moderate Pain (4 - 6)  aluminum hydroxide/magnesium hydroxide/simethicone Suspension 30 milliLiter(s) Oral every 4 hours PRN Dyspepsia  dextrose Oral Gel 15 Gram(s) Oral once PRN Blood Glucose LESS THAN 70 milliGRAM(s)/deciliter  heparin   Injectable 9000 Unit(s) IV Push every 6 hours PRN For aPTT less than 40  heparin   Injectable 4000 Unit(s) IV Push every 6 hours PRN For aPTT between 40 - 57  sodium chloride 0.65% Nasal 1 Spray(s) Both Nostrils three times a day PRN Nasal Congestion            REVIEW OF SYSTEMS:  Constitutional: [ ] fever, [ ]weight loss,  [ ]fatigue [ ]weight gain  Eyes: [ ] visual changes  Respiratory: [ ]shortness of breath;  [ ] cough, [ ]wheezing, [ ]chills, [ ]hemoptysis  Cardiovascular: [ ] chest pain, [ ]palpitations, [ ]dizziness,  [ ]leg swelling[ ]orthopnea[ ]PND  Gastrointestinal: [ ] abdominal pain, [ ]nausea, [ ]vomiting,  [ ]diarrhea [ ]Constipation [ ]Melena  Genitourinary: [ ] dysuria, [ ] hematuria [ ]Junior  Neurologic: [ ] headaches [ ] tremors[ ]weakness [ ]Paralysis Right[ ] Left[ ]  Skin: [ ] itching, [ ]burning, [ ] rashes  Endocrine: [ ] heat or cold intolerance  Musculoskeletal: [ ] joint pain or swelling; [ ] muscle, back, or extremity pain  Psychiatric: [ ] depression, [ ]anxiety, [ ]mood swings, or [ ]difficulty sleeping  Hematologic: [ ] easy bruising, [ ] bleeding gums    [ ] All remaining systems negative except as per above.   [ ]Unable to obtain.  [x] No change in ROS since admission      Vital Signs Last 24 Hrs  T(C): 36.9 (20 Jan 2023 18:04), Max: 36.9 (20 Jan 2023 12:50)  T(F): 98.4 (20 Jan 2023 18:04), Max: 98.4 (20 Jan 2023 12:50)  HR: 80 (20 Jan 2023 21:13) (75 - 88)  BP: 134/65 (20 Jan 2023 18:04) (130/72 - 134/65)  BP(mean): --  RR: 20 (20 Jan 2023 18:04) (17 - 20)  SpO2: 99% (20 Jan 2023 21:13) (99% - 100%)    Parameters below as of 20 Jan 2023 21:13  Patient On (Oxygen Delivery Method): nasal cannula      I&O's Summary    19 Jan 2023 07:01  -  20 Jan 2023 07:00  --------------------------------------------------------  IN: 225 mL / OUT: 2825 mL / NET: -2600 mL    20 Jan 2023 07:01  -  20 Jan 2023 22:32  --------------------------------------------------------  IN: 0 mL / OUT: 250 mL / NET: -250 mL        PHYSICAL EXAM:  General: No acute distress BMI-  HEENT: EOMI, PERRL  Neck: Supple, [ ] JVD  Lungs: Equal air entry bilaterally; [ ] rales [ ] wheezing [ ] rhonchi  Heart: Regular rate and rhythm; [x ] murmur   2/6 [ x] systolic [ ] diastolic [ ] radiation[ ] rubs [ ]  gallops  Abdomen: Nontender, bowel sounds present  Extremities: No clubbing, cyanosis, [ ] edema [ ]Pulses  equal and intact  Nervous system:  Alert & Oriented X3, no focal deficits  Psychiatric: Normal affect  Skin: No rashes or lesions    LABS:  01-20    136  |  89<L>  |  137<H>  ----------------------------<  136<H>  4.3   |  35<H>  |  2.37<H>    Ca    10.2      20 Jan 2023 05:50  Phos  3.7     01-20  Mg     2.60     01-20      Creatinine Trend: 2.37<--, 2.19<--, 1.92<--, 2.18<--, 2.03<--, 1.86<--                        12.1   10.14 )-----------( 230      ( 20 Jan 2023 05:50 )             38.5     PT/INR - ( 20 Jan 2023 05:50 )   PT: 28.1 sec;   INR: 2.40 ratio         PTT - ( 20 Jan 2023 17:31 )  PTT:>200 sec

## 2023-01-20 NOTE — DISCHARGE NOTE PROVIDER - NSDCCPCAREPLAN_GEN_ALL_CORE_FT
PRINCIPAL DISCHARGE DIAGNOSIS  Diagnosis: COPD exacerbation  Assessment and Plan of Treatment: You were treated for a COPD exacerbation. You are being discharged on oral steroids. To continue using your bipap at night.         SECONDARY DISCHARGE DIAGNOSES  Diagnosis: H/O mitral valve replacement with mechanical valve  Assessment and Plan of Treatment: Your INR was low on admission because you did not take your Coumadin. You are now therapeutic and being discharged on 5mg daily. Do not miss any doses.    Diagnosis: Diastolic CHF, acute on chronic  Assessment and Plan of Treatment: You were treated for a heart failure exacerbation. You were treated with a Bumex drip, now on being discharged on Bumex 2mg daily. You should see cardiology and nephrology (kidney doctor) for outpatient workup of your kdineys and your known left kidney mass.    Diagnosis: Hypertension  Assessment and Plan of Treatment: Low sodium and fat diet, continue anti-hypertensive medications, and follow up with primary care physician. We started  you on Hydralazine.

## 2023-01-20 NOTE — DISCHARGE NOTE PROVIDER - HOSPITAL COURSE
71 y/o female, with PMHx of Gout, COPD, HTN, DM (A1c 6.2%), CHF, CKD, Afib, Pulm HTN, Mitral and Tricuspid Valve replacement (mechanical), presenting with SOB, admitted to medicine for COPD exacerbation. Has O2 at home, 3L.     1. COPD exacerbation   - s/p IV solumedrol, switched to PO 1/18   - bipap at night - to continue at home (has device).     2. CHF exacerbation   - s/p Bumex gtt - now stopped 1/19 as CT chest shows no pulm edema    3. Afib/mechanical valve - Goal INR 2.5-3.5  - Subtherapeutic INR on admission because held coumadin for 3 days prior 2/2 epistaxis  - s/p Heparin gtt to coumadin bridge, therapeutic     4. MISAEL - hemodynamically mediated in setting of diuresis -  - Bumex held but will resume at 2mg upon discharge.    - Patient should get outpatient CKD workup.   - Patient has known L renal mass - urology follow up as outpatient     5. HTN - Hydralazine started 1/17     Patient seen and evaluated, no acute somatic complaints. Reviewed discharge medications with patient; All new medications requiring new prescriptions were sent to the pharmacy of patient's choice. Reviewed need for prescription for previous home medications and new prescriptions sent if requested. Medically cleared/stable for discharge as per Dr. Newman with close outpatient follow up within 1-2 weeks of discharge. Patient understands and agrees with plan of care. 73 y/o female, with PMHx of Gout, COPD, HTN, DM (A1c 6.2%), CHF, CKD, Afib, Pulm HTN, Mitral and Tricuspid Valve replacement (mechanical), presenting with SOB, admitted to medicine for COPD exacerbation. Has O2 at home, 3L.     1. COPD exacerbation   - s/p IV solumedrol, switched to PO 1/18. Never tapered, ok to resume her 5mg dose as per Dr Merida   - bipap at night - to continue at home (has device).     2. CHF exacerbation   - s/p Bumex gtt - now stopped 1/19 as CT chest shows no pulm edema    3. Afib/mechanical valve - Goal INR 2.5-3.5  - Subtherapeutic INR on admission because held coumadin for 3 days prior 2/2 epistaxis  - s/p Heparin gtt to coumadin bridge, therapeutic     4. MISAEL - hemodynamically mediated in setting of diuresis -  - Bumex held but will resume at 2mg upon discharge.    - Patient should get outpatient CKD workup.   - Patient has known L renal mass - urology follow up as outpatient     5. HTN - Hydralazine started 1/17     Patient seen and evaluated, no acute somatic complaints. Reviewed discharge medications with patient; All new medications requiring new prescriptions were sent to the pharmacy of patient's choice. Reviewed need for prescription for previous home medications and new prescriptions sent if requested. Medically cleared/stable for discharge as per Dr. Newman with close outpatient follow up within 1-2 weeks of discharge. Patient understands and agrees with plan of care.

## 2023-01-20 NOTE — DISCHARGE NOTE PROVIDER - DETAILS OF MALNUTRITION DIAGNOSIS/DIAGNOSES
This patient has been assessed with a concern for Malnutrition and was treated during this hospitalization for the following Nutrition diagnosis/diagnoses:     -  01/20/2023: Morbid obesity (BMI > 40)

## 2023-01-20 NOTE — PROGRESS NOTE ADULT - ASSESSMENT
72F with h/o COPD, pHTN, mech MVR on coumadin, Afib/flutter, MIGUEL presented with worsening SOB from baseline. On admission concern for COPD exacerbation. CXR is concerning for CHF. admit for management.       #  COPD exacerbation.   COPD exacerbation - 2L NC SpO2 91%  - s/p IV solumedrol, switched to PO 1/18   - BiPAP ordered as per pulm  - Monitor FS while on steroids    # Afib/mechanical valve - Goal INR 2.5-3.5  - Subtherapeutic INR on admission because held coumadin for 3 days prior 2/2 epistaxis  - s/p Heparin gtt to coumadin bridge  - Coumadin 5mg dosed 1/18, INR 2.7    # Diastolic CHF, acute on chronic.   trend 2.0-3.0 for most medical and surgical thromboembolic states.  2.0-3.0 for atrial fibrillation.  2.0-3.0 for bileaflet mechanical valve in aortic position.  2.5-3.5 for mechanical heart valves.  Chest 2004:126:[id number].  The presence of direct thrombin inhibitors(argatroban,refludan may falsely increase results,ratio.bumex, now on Bumex gtt (cxr with increased vascular congestion)   - strict I&O - patient using commode     # Afib.   h/o Afib and Aflutter s/p ablation and recent DCCV.   c/w Amiodarone.   c/w warfarin for AC.  trend INR    # HTN - Hydralazine

## 2023-01-20 NOTE — DISCHARGE NOTE NURSING/CASE MANAGEMENT/SOCIAL WORK - PATIENT PORTAL LINK FT
You can access the FollowMyHealth Patient Portal offered by [facility] by registering at the following website: [email or web address]. By joining [employer]’s FollowMyHealth portal, you will also be able to view your health information using other applications (apps) compatible with our system.

## 2023-01-20 NOTE — DIETITIAN INITIAL EVALUATION ADULT - OTHER INFO
72F with h/o COPD, pHTN, mech MVR on coumadin, Afib/flutter, MIGUEL presented with worsening SOB from baseline. On admission concern for COPD exacerbation. CXR is concerning for CHF. admit for management.    Pt seen and reports 75% intake of meals with No GI distress (nausea/vomiting/diarrhea/constipation.) at present. Pt with stable weights in past 3 months. Labs reviewed. A1c- 6.2%. Noted generalized edema per nursing flow sheet.

## 2023-01-20 NOTE — DISCHARGE NOTE NURSING/CASE MANAGEMENT/SOCIAL WORK - NSDCFUADDAPPT_GEN_ALL_CORE_FT
Follow up with your cardiologist Dr. Duarte on Friday 1/27 at 10:30 in AM for INR check. Your Coumadin dose will be adjusted accordingly     Follow up with pulmonology within 1-2 weeks of discharge. Pulmonary/Sleep Clinic  [address] [place], [place] [address] [phone]     Follow up with nephrology within 1-2 weeks of discharge.

## 2023-01-20 NOTE — DISCHARGE NOTE PROVIDER - NSDCMRMEDTOKEN_GEN_ALL_CORE_FT
amiodarone 200 mg oral tablet: 1 tab(s) orally once a day MDD:1  budesonide-formoterol 160 mcg-4.5 mcg/inh inhalation aerosol: 2 puff(s) inhaled 2 times a day   bumetanide 1 mg oral tablet: 2 tab(s) orally 2 times a day  ferrous sulfate 324 mg (65 mg elemental iron) oral tablet: 1 tab(s) orally once a day  isosorbide mononitrate 30 mg oral tablet, extended release: 1 tab(s) orally once a day MDD:1  Levemir FlexTouch 100 units/mL subcutaneous solution: 25 unit(s) subcutaneous once a day (at bedtime)   NovoLOG FlexPen 100 units/mL injectable solution: 10 unit(s) injectable 3 times a day (before meals)   predniSONE 5 mg oral tablet: 1 tab(s) orally once a day MDD:1  ProAir HFA 90 mcg/inh inhalation aerosol: 2 puff(s) inhaled every 6 hours  Protonix 40 mg oral delayed release tablet: 1 tab(s) orally once a day   simvastatin 10 mg oral tablet: 1 tab(s) orally once a day (at bedtime)  warfarin 10 mg oral tablet: 1 tab(s) orally once a day   amiodarone 200 mg oral tablet: 1 tab(s) orally once a day MDD:1  budesonide-formoterol 160 mcg-4.5 mcg/inh inhalation aerosol: 2 puff(s) inhaled 2 times a day   ferrous sulfate 324 mg (65 mg elemental iron) oral tablet: 1 tab(s) orally once a day  isosorbide mononitrate 30 mg oral tablet, extended release: 1 tab(s) orally once a day MDD:1  Levemir FlexTouch 100 units/mL subcutaneous solution: 25 unit(s) subcutaneous once a day (at bedtime)   NovoLOG FlexPen 100 units/mL injectable solution: 10 unit(s) injectable 3 times a day (before meals)   predniSONE 5 mg oral tablet: 1 tab(s) orally once a day MDD:1  ProAir HFA 90 mcg/inh inhalation aerosol: 2 puff(s) inhaled every 6 hours  Protonix 40 mg oral delayed release tablet: 1 tab(s) orally once a day   simvastatin 10 mg oral tablet: 1 tab(s) orally once a day (at bedtime)   amiodarone 200 mg oral tablet: 1 tab(s) orally once a day MDD:1  budesonide-formoterol 160 mcg-4.5 mcg/inh inhalation aerosol: 2 puff(s) inhaled 2 times a day   bumetanide 2 mg oral tablet: 1 tab(s) orally once a day   ferrous sulfate 324 mg (65 mg elemental iron) oral tablet: 1 tab(s) orally once a day  hydrALAZINE 50 mg oral tablet: 1 tab(s) orally 3 times a day  isosorbide mononitrate 30 mg oral tablet, extended release: 1 tab(s) orally once a day MDD:1  Levemir FlexTouch 100 units/mL subcutaneous solution: 25 unit(s) subcutaneous once a day (at bedtime)   NovoLOG FlexPen 100 units/mL injectable solution: 10 unit(s) injectable 3 times a day (before meals)   predniSONE 5 mg oral tablet: 1 tab(s) orally once a day MDD:1  ProAir HFA 90 mcg/inh inhalation aerosol: 2 puff(s) inhaled every 6 hours  Protonix 40 mg oral delayed release tablet: 1 tab(s) orally once a day   simvastatin 10 mg oral tablet: 1 tab(s) orally once a day (at bedtime)  warfarin 5 mg oral tablet: 1 tab(s) orally once a day

## 2023-01-20 NOTE — DISCHARGE NOTE PROVIDER - NSDCFUADDAPPT_GEN_ALL_CORE_FT
Follow up with your cardiologist Dr. Duarte on Friday 1/27 at 10:30 in AM for INR check. Your Coumadin dose will be adjusted accordingly  Follow up with your cardiologist Dr. Duarte on Friday 1/27 at 10:30 in AM for INR check. Your Coumadin dose will be adjusted accordingly     Follow up with pulmonology within 1-2 weeks of discharge. Pulmonary/Sleep Clinic  [address] [place], [place] [id number] [phone]     Follow up with nephrology within 1-2 weeks of discharge.

## 2023-01-20 NOTE — DISCHARGE NOTE PROVIDER - CARE PROVIDERS DIRECT ADDRESSES
,DirectAddress_Unknown ,DirectAddress_Unknown,[email or web address],DirectAddress_Unknown

## 2023-01-20 NOTE — DISCHARGE NOTE PROVIDER - CARE PROVIDER_API CALL
Gerald Manning  CARDIOVASCULAR DISEASE  [address] [place] [address]  Phone: [phone]  Fax: [phone]  Follow Up Time:    Gerald Manning  CARDIOVASCULAR DISEASE  [address] [place] [address]  Phone: [phone]  Fax: [phone]  Follow Up Time:     Em Merida (MD)  Critical Care Medicine; Internal Medicine; Pulmonary Disease  [address] [facility] [address], [address]  [place] [address]  Phone: [phone]  Fax: [phone]  Follow Up Time:     Rob Perez (DO)  Internal Medicine  [address] [place] [address]  Phone: [phone]  Fax: [phone]  Follow Up Time:

## 2023-01-20 NOTE — PROGRESS NOTE ADULT - ASSESSMENT
72 year old Female with history of COPD, CHF presents with weakness. Nephrology consulted for elevated Scr.    1) MISAEL: likely hemodynamically mediated in setting of diuresis. Scr increasing for which bumex gtt discontinued on 1/19 with azotemia in part due to steroids. UA bland. FeNa low. Bladder scan negative. Avoid nephrotoxins.    2) CKD-3b: Baseline Scr 1.6-1.8 thought to be secondary to DM. Outpatient CKD work up. Monitor electrolytes.    3) HTN with CKD: BP improving. Continue with current medications. Monitor BP.    4) LE edema: Improving s/p bumex gtt (discontinued on 1/19). Start bumex 2 mg PO daily for maintenance. Prior TTE with mild to moderate global LVSD. Check AM blood gas r/o contraction alkalosis. Monitor UO.    5) Hypercalcemia: likely primary HPT exacerbated in setting of diuresis. Will add sensipar if hypercalcemia recurs. Monitor serum calcium.    6) L renal mass: Patient aware of L renal mass. Would ensure patient has Urology follow up.      [facility] NEPHROLOGY  Rodrigue Amador M.D.  Rob Perez D.O.  Ana Song M.D.  Lucrecia López, MSN, ANP-C    Telephone: [phone]  Facsimile: [phone]    [address] [place] [address]

## 2023-01-20 NOTE — DISCHARGE NOTE NURSING/CASE MANAGEMENT/SOCIAL WORK - NSDCPEFALRISK_GEN_ALL_CORE
For information on Fall & Injury Prevention, visit: [email or web address]  [email or web address] OR  https://www.cdc.gov/steadi/patient.html

## 2023-01-20 NOTE — DIETITIAN INITIAL EVALUATION ADULT - PERTINENT LABORATORY DATA
01-20    136  |  89<L>  |  137<H>  ----------------------------<  136<H>  4.3   |  35<H>  |  2.37<H>    Ca    10.2      20 Jan 2023 05:50  Phos  3.7     01-20  Mg     2.60     01-20    POCT Blood Glucose.: 262 mg/dL (01-20-23 @ 12:16)  A1C with Estimated Average Glucose Result: 6.2 % (01-13-23 @ 08:45)  A1C with Estimated Average Glucose Result: 6.6 % (10-25-22 @ 04:54)  A1C with Estimated Average Glucose Result: 10.7 % (08-23-22 @ 23:18)

## 2023-01-20 NOTE — DISCHARGE NOTE PROVIDER - PROVIDER TOKENS
PROVIDER:[TOKEN:[[id number]]] PROVIDER:[TOKEN:[[id number]]],PROVIDER:[TOKEN:[[id number]]],PROVIDER:[TOKEN:[[id number]]]

## 2023-01-20 NOTE — PROGRESS NOTE ADULT - SUBJECTIVE AND OBJECTIVE BOX
[facility] NEPHROLOGY- PROGRESS NOTE    72 year old Female with history of COPD, CHF presents with weakness. Nephrology consulted for elevated Scr.    REVIEW OF SYSTEMS:  Gen: no fevers  Cards: no chest pain  Resp: + dyspnea improving  GI: no nausea or vomiting or diarrhea  Vascular: + LE edema improving    No Known Allergies      Hospital Medications: Medications reviewed      VITALS:  T(F): 97.8 (23 @ 05:28), Max: 98.3 (23 @ 13:35)  HR: 88 (23 @ 05:28)  BP: 133/62 (23 @ 05:28)  RR: 17 (23 @ 05:28)  SpO2: 99% (23 @ 05:28)  Wt(kg): --     @ 07:  -   @ 07:00  --------------------------------------------------------  IN: 225 mL / OUT: 2825 mL / NET: -2600 mL        PHYSICAL EXAM:    Gen: NAD, calm  Cards: RRR, +S1/S2, no M/G/R  Resp: CTA B/L  GI: soft, NT/ND, NABS  Vascular: 1+ RLE edema > trace LLE edema      LABS:      136  |  89<L>  |  137<H>  ----------------------------<  136<H>  4.3   |  35<H>  |  2.37<H>    Ca    10.2      2023 05:50  Phos  3.7       Mg     2.60           Creatinine Trend: 2.37 <--, 2.19 <--, 1.92 <--, 2.18 <--, 2.03 <--, 1.86 <--, 1.78 <--, 1.81 <--, 1.56 <--                        12.1   10.14 )-----------( 230      ( 2023 05:50 )             38.5     Urine Studies:  Urinalysis Basic - ( 2023 17:54 )    Color: Colorless / Appearance: Clear / S.011 / pH:   Gluc:  / Ketone: Negative  / Bili: Negative / Urobili: <2 mg/dL   Blood:  / Protein: Negative / Nitrite: Negative   Leuk Esterase: Negative / RBC: 1 /HPF / WBC 1 /HPF   Sq Epi:  / Non Sq Epi:  / Bacteria: Negative      Creatinine, Random Urine: 17 mg/dL ( @ 17:54)          < from: CT Chest No Cont (23 @ 14:11) >  IMPRESSION:    Since CT chest 2022:    No pulmonary edema.    Left upper pole renal mass demonstrating slight Interval increase since   previous exam of 2022.            --- End of Report ---    < end of copied text >

## 2023-01-21 LAB
ANION GAP SERPL CALC-SCNC: 15 MMOL/L — HIGH (ref 7–14)
APTT BLD: 162.4 SEC — SIGNIFICANT CHANGE UP (ref 27–36.3)
APTT BLD: 43.5 SEC — HIGH (ref 27–36.3)
APTT BLD: 66.8 SEC — HIGH (ref 27–36.3)
BASE EXCESS BLDV CALC-SCNC: 15.3 MMOL/L — HIGH (ref -2–3)
BASOPHILS # BLD AUTO: 0 K/UL — SIGNIFICANT CHANGE UP (ref 0–0.2)
BASOPHILS NFR BLD AUTO: 0 % — SIGNIFICANT CHANGE UP (ref 0–2)
BUN SERPL-MCNC: 138 MG/DL — HIGH (ref 7–23)
CALCIUM SERPL-MCNC: 9.7 MG/DL — SIGNIFICANT CHANGE UP (ref 8.4–10.5)
CHLORIDE SERPL-SCNC: 89 MMOL/L — LOW (ref 98–107)
CO2 BLDV-SCNC: 42.9 MMOL/L — HIGH (ref 22–26)
CO2 SERPL-SCNC: 31 MMOL/L — SIGNIFICANT CHANGE UP (ref 22–31)
CREAT SERPL-MCNC: 2.48 MG/DL — HIGH (ref 0.5–1.3)
EGFR: 20 ML/MIN/1.73M2 — LOW
EOSINOPHIL # BLD AUTO: 0.1 K/UL — SIGNIFICANT CHANGE UP (ref 0–0.5)
EOSINOPHIL NFR BLD AUTO: 1 % — SIGNIFICANT CHANGE UP (ref 0–6)
GLUCOSE BLDC GLUCOMTR-MCNC: 169 MG/DL — HIGH (ref 70–99)
GLUCOSE BLDC GLUCOMTR-MCNC: 189 MG/DL — HIGH (ref 70–99)
GLUCOSE BLDC GLUCOMTR-MCNC: 292 MG/DL — HIGH (ref 70–99)
GLUCOSE BLDC GLUCOMTR-MCNC: 88 MG/DL — SIGNIFICANT CHANGE UP (ref 70–99)
GLUCOSE SERPL-MCNC: 110 MG/DL — HIGH (ref 70–99)
HCO3 BLDV-SCNC: 41 MMOL/L — HIGH (ref 22–29)
HCT VFR BLD CALC: 35.8 % — SIGNIFICANT CHANGE UP (ref 34.5–45)
HGB BLD-MCNC: 11 G/DL — LOW (ref 11.5–15.5)
IANC: 7.52 K/UL — HIGH (ref 1.8–7.4)
IMM GRANULOCYTES NFR BLD AUTO: 0.6 % — SIGNIFICANT CHANGE UP (ref 0–0.9)
INR BLD: 2.71 RATIO — HIGH (ref 0.88–1.16)
LYMPHOCYTES # BLD AUTO: 0.99 K/UL — LOW (ref 1–3.3)
LYMPHOCYTES # BLD AUTO: 10.3 % — LOW (ref 13–44)
MCHC RBC-ENTMCNC: 29.9 PG — SIGNIFICANT CHANGE UP (ref 27–34)
MCHC RBC-ENTMCNC: 30.7 GM/DL — LOW (ref 32–36)
MCV RBC AUTO: 97.3 FL — SIGNIFICANT CHANGE UP (ref 80–100)
MONOCYTES # BLD AUTO: 0.9 K/UL — SIGNIFICANT CHANGE UP (ref 0–0.9)
MONOCYTES NFR BLD AUTO: 9.4 % — SIGNIFICANT CHANGE UP (ref 2–14)
NEUTROPHILS # BLD AUTO: 7.52 K/UL — HIGH (ref 1.8–7.4)
NEUTROPHILS NFR BLD AUTO: 78.7 % — HIGH (ref 43–77)
NRBC # BLD: 0 /100 WBCS — SIGNIFICANT CHANGE UP (ref 0–0)
NRBC # FLD: 0 K/UL — SIGNIFICANT CHANGE UP (ref 0–0)
PCO2 BLDV: 54 MMHG — HIGH (ref 39–42)
PH BLDV: 7.49 — HIGH (ref 7.32–7.43)
PLATELET # BLD AUTO: 222 K/UL — SIGNIFICANT CHANGE UP (ref 150–400)
PO2 BLDV: 79 MMHG — SIGNIFICANT CHANGE UP
POTASSIUM SERPL-MCNC: 4.4 MMOL/L — SIGNIFICANT CHANGE UP (ref 3.5–5.3)
POTASSIUM SERPL-SCNC: 4.4 MMOL/L — SIGNIFICANT CHANGE UP (ref 3.5–5.3)
PROTHROM AB SERPL-ACNC: 31.8 SEC — HIGH (ref 10.5–13.4)
RBC # BLD: 3.68 M/UL — LOW (ref 3.8–5.2)
RBC # FLD: 15.2 % — HIGH (ref 10.3–14.5)
SAO2 % BLDV: 96.2 % — SIGNIFICANT CHANGE UP
SODIUM SERPL-SCNC: 135 MMOL/L — SIGNIFICANT CHANGE UP (ref 135–145)
WBC # BLD: 9.57 K/UL — SIGNIFICANT CHANGE UP (ref 3.8–10.5)
WBC # FLD AUTO: 9.57 K/UL — SIGNIFICANT CHANGE UP (ref 3.8–10.5)

## 2023-01-21 RX ORDER — WARFARIN SODIUM 2.5 MG/1
5 TABLET ORAL ONCE
Refills: 0 | Status: COMPLETED | OUTPATIENT
Start: 2023-01-21 | End: 2023-01-21

## 2023-01-21 RX ADMIN — Medication 2: at 18:05

## 2023-01-21 RX ADMIN — Medication 50 MILLIGRAM(S): at 23:09

## 2023-01-21 RX ADMIN — HEPARIN SODIUM 4000 UNIT(S): 5000 INJECTION INTRAVENOUS; SUBCUTANEOUS at 16:29

## 2023-01-21 RX ADMIN — HEPARIN SODIUM 0 UNIT(S)/HR: 5000 INJECTION INTRAVENOUS; SUBCUTANEOUS at 02:21

## 2023-01-21 RX ADMIN — Medication 10 UNIT(S): at 18:05

## 2023-01-21 RX ADMIN — Medication 40 MILLIGRAM(S): at 06:59

## 2023-01-21 RX ADMIN — PANTOPRAZOLE SODIUM 40 MILLIGRAM(S): 20 TABLET, DELAYED RELEASE ORAL at 07:00

## 2023-01-21 RX ADMIN — BUMETANIDE 2 MILLIGRAM(S): 0.25 INJECTION INTRAMUSCULAR; INTRAVENOUS at 07:03

## 2023-01-21 RX ADMIN — Medication 3 MILLILITER(S): at 15:38

## 2023-01-21 RX ADMIN — HEPARIN SODIUM 600 UNIT(S)/HR: 5000 INJECTION INTRAVENOUS; SUBCUTANEOUS at 07:25

## 2023-01-21 RX ADMIN — WARFARIN SODIUM 5 MILLIGRAM(S): 2.5 TABLET ORAL at 23:08

## 2023-01-21 RX ADMIN — BUDESONIDE AND FORMOTEROL FUMARATE DIHYDRATE 2 PUFF(S): 160; 4.5 AEROSOL RESPIRATORY (INHALATION) at 09:01

## 2023-01-21 RX ADMIN — SIMVASTATIN 10 MILLIGRAM(S): 20 TABLET, FILM COATED ORAL at 23:09

## 2023-01-21 RX ADMIN — BUDESONIDE AND FORMOTEROL FUMARATE DIHYDRATE 2 PUFF(S): 160; 4.5 AEROSOL RESPIRATORY (INHALATION) at 23:30

## 2023-01-21 RX ADMIN — LIDOCAINE 1 PATCH: 4 CREAM TOPICAL at 13:11

## 2023-01-21 RX ADMIN — Medication 3 MILLILITER(S): at 09:30

## 2023-01-21 RX ADMIN — Medication 325 MILLIGRAM(S): at 13:12

## 2023-01-21 RX ADMIN — Medication 6: at 13:10

## 2023-01-21 RX ADMIN — Medication 50 MILLIGRAM(S): at 13:13

## 2023-01-21 RX ADMIN — Medication 10 UNIT(S): at 13:10

## 2023-01-21 RX ADMIN — Medication 10 UNIT(S): at 09:00

## 2023-01-21 RX ADMIN — ISOSORBIDE MONONITRATE 30 MILLIGRAM(S): 60 TABLET, EXTENDED RELEASE ORAL at 13:12

## 2023-01-21 RX ADMIN — HEPARIN SODIUM 600 UNIT(S)/HR: 5000 INJECTION INTRAVENOUS; SUBCUTANEOUS at 03:28

## 2023-01-21 RX ADMIN — HEPARIN SODIUM 600 UNIT(S)/HR: 5000 INJECTION INTRAVENOUS; SUBCUTANEOUS at 09:08

## 2023-01-21 RX ADMIN — Medication 3 MILLILITER(S): at 03:49

## 2023-01-21 RX ADMIN — Medication 50 MILLIGRAM(S): at 07:00

## 2023-01-21 RX ADMIN — HEPARIN SODIUM 800 UNIT(S)/HR: 5000 INJECTION INTRAVENOUS; SUBCUTANEOUS at 16:28

## 2023-01-21 RX ADMIN — Medication 2: at 09:00

## 2023-01-21 RX ADMIN — AMIODARONE HYDROCHLORIDE 200 MILLIGRAM(S): 400 TABLET ORAL at 07:00

## 2023-01-21 RX ADMIN — Medication 3 MILLILITER(S): at 21:05

## 2023-01-21 NOTE — PROGRESS NOTE ADULT - SUBJECTIVE AND OBJECTIVE BOX
SUBJECTIVE / OVERNIGHT EVENTS:pt seen and examined  23     MEDICATIONS  (STANDING):  acetaminophen   IVPB .. 1000 milliGRAM(s) IV Intermittent once  albuterol/ipratropium for Nebulization 3 milliLiter(s) Nebulizer every 6 hours  aMIOdarone    Tablet 200 milliGRAM(s) Oral daily  budesonide 160 MICROgram(s)/formoterol 4.5 MICROgram(s) Inhaler 2 Puff(s) Inhalation two times a day  dextrose 5%. 1000 milliLiter(s) (100 mL/Hr) IV Continuous <Continuous>  dextrose 5%. 1000 milliLiter(s) (50 mL/Hr) IV Continuous <Continuous>  dextrose 50% Injectable 25 Gram(s) IV Push once  dextrose 50% Injectable 12.5 Gram(s) IV Push once  dextrose 50% Injectable 25 Gram(s) IV Push once  ferrous    sulfate 325 milliGRAM(s) Oral daily  glucagon  Injectable 1 milliGRAM(s) IntraMuscular once  heparin  Infusion. 700 Unit(s)/Hr (7 mL/Hr) IV Continuous <Continuous>  hydrALAZINE 50 milliGRAM(s) Oral three times a day  insulin glargine Injectable (LANTUS) 25 Unit(s) SubCutaneous at bedtime  insulin lispro (ADMELOG) corrective regimen sliding scale   SubCutaneous three times a day before meals  insulin lispro (ADMELOG) corrective regimen sliding scale   SubCutaneous at bedtime  insulin lispro Injectable (ADMELOG) 10 Unit(s) SubCutaneous three times a day before meals  isosorbide   mononitrate ER Tablet (IMDUR) 30 milliGRAM(s) Oral daily  lidocaine   4% Patch 1 Patch Transdermal daily  lidocaine   4% Patch 1 Patch Transdermal daily  pantoprazole    Tablet 40 milliGRAM(s) Oral before breakfast  predniSONE   Tablet 40 milliGRAM(s) Oral daily  simvastatin 10 milliGRAM(s) Oral at bedtime    MEDICATIONS  (PRN):  acetaminophen     Tablet .. 650 milliGRAM(s) Oral every 6 hours PRN Temp greater or equal to 38C (100.4F), Mild Pain (1 - 3), Moderate Pain (4 - 6)  aluminum hydroxide/magnesium hydroxide/simethicone Suspension 30 milliLiter(s) Oral every 4 hours PRN Dyspepsia  dextrose Oral Gel 15 Gram(s) Oral once PRN Blood Glucose LESS THAN 70 milliGRAM(s)/deciliter  heparin   Injectable 9000 Unit(s) IV Push every 6 hours PRN For aPTT less than 40  heparin   Injectable 4000 Unit(s) IV Push every 6 hours PRN For aPTT between 40 - 57  sodium chloride 0.65% Nasal 1 Spray(s) Both Nostrils three times a day PRN Nasal Congestion    Vital Signs Last 24 Hrs  T(C): 37.7 (23 @ 13:10), Max: 37.7 (23 @ 13:10)  T(F): 99.8 (23 @ 13:10), Max: 99.8 (23 @ 13:10)  HR: 78 (23 @ 21:05) (72 - 86)  BP: 141/66 (23 @ 13:10) (128/61 - 141/66)  BP(mean): --  RR: 18 (23 @ 13:10) (18 - 18)  SpO2: 95% (23 @ 21:05) (95% - 100%)          Constitutional: No fever, fatigue  Skin: No rash.  Eyes: No recent vision problems or eye pain.  ENT: No congestion, ear pain, or sore throat.  Cardiovascular: No chest pain or palpation.  Respiratory: No cough, shortness of breath, congestion, or wheezing.  Gastrointestinal: No abdominal pain, nausea, vomiting, or diarrhea.  Genitourinary: No dysuria.  Musculoskeletal: No joint swelling.  Neurologic: No headache.    PHYSICAL EXAM:  GENERAL: NAD  EYES: EOMI, PERRLA  NECK: Supple, No JVD  CHEST/LUNG: dec breath sounds at bases  HEART:  S1 , S2 +  ABDOMEN: soft, bs+  EXTREMITIES:  edema+  NEUROLOGY:alert awake oriented     LABS:      135  |  89<L>  |  138<H>  ----------------------------<  110<H>  4.4   |  31  |  2.48<H>    Ca    9.7      2023 06:00  Phos  3.7       Mg     2.60           Creatinine Trend: 2.48 <--, 2.37 <--, 2.19 <--, 1.92 <--, 2.18 <--, 2.03 <--, 1.86 <--                        11.0   9.57  )-----------( 222      ( 2023 06:00 )             35.8     Urine Studies:  Urinalysis Basic - ( 2023 17:54 )    Color: Colorless / Appearance: Clear / S.011 / pH:   Gluc:  / Ketone: Negative  / Bili: Negative / Urobili: <2 mg/dL   Blood:  / Protein: Negative / Nitrite: Negative   Leuk Esterase: Negative / RBC: 1 /HPF / WBC 1 /HPF   Sq Epi:  / Non Sq Epi:  / Bacteria: Negative      Creatinine, Random Urine: 17 mg/dL ( @ 17:54)            PT/INR - ( 2023 06:00 )   PT: 31.8 sec;   INR: 2.71 ratio         PTT - ( 2023 16:07 )  PTT:43.5 sec    RADIOLOGY & ADDITIONAL TESTS:    Imaging Personally Reviewed:yes    Consultant(s) Notes Reviewed:  yes    Care Discussed with Consultants/Other Providers:yes

## 2023-01-21 NOTE — PROGRESS NOTE ADULT - ASSESSMENT
72 year old Female with history of COPD, CHF presents with weakness. Nephrology consulted for elevated Scr.    1) MISAEL: likely hemodynamically mediated in setting of diuresis. Scr increasing for which bumex gtt discontinued on 1/19 with azotemia in part due to steroids. UA bland. FeNa low. Bladder scan negative. Avoid nephrotoxins.    2) CKD-3b: Baseline Scr 1.6-1.8 thought to be secondary to DM. Outpatient CKD work up. Monitor electrolytes.    3) HTN with CKD: BP improving. Continue with current medications. Monitor BP.    4) LE edema: Improving s/p bumex gtt (discontinued on 1/19). Recc to hold bumex 2 mg PO daily due to contraction alkalosis. Will resume prior to d/c for maintenance. Prior TTE with mild to moderate global LVSD. Monitor UO.    5) Hypercalcemia: likely primary HPT exacerbated in setting of diuresis. Will add sensipar if hypercalcemia recurs. Monitor serum calcium.    6) L renal mass: Patient aware of L renal mass. Would ensure patient has Urology follow up.      [facility] NEPHROLOGY  Rodrigue Amador M.D.  Rob Perez D.O.  Ana Song M.D.  Lucrecia López, MSN, ANP-C    Telephone: [phone]  Facsimile: [phone]    [address] [place] [address]

## 2023-01-21 NOTE — PROVIDER CONTACT NOTE (CRITICAL VALUE NOTIFICATION) - ACTION/TREATMENT ORDERED:
APTT critical result 137.6. Heparin drip on hold for 1 hr, rate to be adjusted as per heparin monogram.
Patient APTT was 162.4, will delay for 6 min and decrease rate by 3 as per nomogram
Continue to follow nomogram.

## 2023-01-21 NOTE — PROGRESS NOTE ADULT - SUBJECTIVE AND OBJECTIVE BOX
PATIENT SEEN AND EXAMINED ON :- 1/21/23  DATE OF SERVICE:      1/21/23       Interim events noted,Labs ,Radiological studies and Cardiology tests reviewed        HOSPITAL COURSE: HPI:  72F with PMH COPD/MIGUEL/OHS (home BiPAP/ 3L NC), pHTN, MVR on Coumadin AF s/p ablation and recent DCCV on 11/4/22 pw SOB. Patient reports she has been feeling progressive SOB over the past 1 week, associated with orthopniea and intermittent CP. Per patient CP has been chronic and no change in intensity. She received treatment and steroids in ED which help with her symptoms. denied fever, chill, coughing, abd pain, worsening LE edema or diarrhea.   of note, patient endorsed she has not taking warfarin for the past 3 days due to nose bleed.  (13 Jan 2023 11:48)      INTERIM EVENTS:Patient seen at bedside ,interim events noted.      PMH -reviewed admission note, no change since admission  HEART FAILURE: Acute[ ]Chronic[ ] Systolic[ ] Diastolic[ ] Combined Systolic and Diastolic[ ]  CAD[ ] CABG[ ] PCI[ ]  DEVICES[ ] PPM[ ] ICD[ ] ILR[ ]  ATRIAL FIBRILLATION[ ] Paroxysmal[ ] Permanent[ ] CHADS2-[  ]  MISAEL[ ] CKD1[ ] CKD2[ ] CKD3[ ] CKD4[ ] ESRD[ ]  COPD[ ] HTN[ ]   DM[ ] Type1[ ] Type 2[ ]   CVA[ ] Paresis[ ]    AMBULATION: Assisted[ ] Cane/walker[ ] Independent[ ]    MEDICATIONS  (STANDING):  acetaminophen   IVPB .. 1000 milliGRAM(s) IV Intermittent once  albuterol/ipratropium for Nebulization 3 milliLiter(s) Nebulizer every 6 hours  aMIOdarone    Tablet 200 milliGRAM(s) Oral daily  budesonide 160 MICROgram(s)/formoterol 4.5 MICROgram(s) Inhaler 2 Puff(s) Inhalation two times a day  dextrose 5%. 1000 milliLiter(s) (100 mL/Hr) IV Continuous <Continuous>  dextrose 5%. 1000 milliLiter(s) (50 mL/Hr) IV Continuous <Continuous>  dextrose 50% Injectable 25 Gram(s) IV Push once  dextrose 50% Injectable 12.5 Gram(s) IV Push once  dextrose 50% Injectable 25 Gram(s) IV Push once  ferrous    sulfate 325 milliGRAM(s) Oral daily  glucagon  Injectable 1 milliGRAM(s) IntraMuscular once  heparin  Infusion. 700 Unit(s)/Hr (7 mL/Hr) IV Continuous <Continuous>  hydrALAZINE 50 milliGRAM(s) Oral three times a day  insulin glargine Injectable (LANTUS) 25 Unit(s) SubCutaneous at bedtime  insulin lispro (ADMELOG) corrective regimen sliding scale   SubCutaneous three times a day before meals  insulin lispro (ADMELOG) corrective regimen sliding scale   SubCutaneous at bedtime  insulin lispro Injectable (ADMELOG) 10 Unit(s) SubCutaneous three times a day before meals  isosorbide   mononitrate ER Tablet (IMDUR) 30 milliGRAM(s) Oral daily  lidocaine   4% Patch 1 Patch Transdermal daily  lidocaine   4% Patch 1 Patch Transdermal daily  pantoprazole    Tablet 40 milliGRAM(s) Oral before breakfast  predniSONE   Tablet 40 milliGRAM(s) Oral daily  simvastatin 10 milliGRAM(s) Oral at bedtime  warfarin 5 milliGRAM(s) Oral once    MEDICATIONS  (PRN):  acetaminophen     Tablet .. 650 milliGRAM(s) Oral every 6 hours PRN Temp greater or equal to 38C (100.4F), Mild Pain (1 - 3), Moderate Pain (4 - 6)  aluminum hydroxide/magnesium hydroxide/simethicone Suspension 30 milliLiter(s) Oral every 4 hours PRN Dyspepsia  dextrose Oral Gel 15 Gram(s) Oral once PRN Blood Glucose LESS THAN 70 milliGRAM(s)/deciliter  heparin   Injectable 9000 Unit(s) IV Push every 6 hours PRN For aPTT less than 40  heparin   Injectable 4000 Unit(s) IV Push every 6 hours PRN For aPTT between 40 - 57  sodium chloride 0.65% Nasal 1 Spray(s) Both Nostrils three times a day PRN Nasal Congestion            REVIEW OF SYSTEMS:  Constitutional: [ ] fever, [ ]weight loss,  [ ]fatigue [ ]weight gain  Eyes: [ ] visual changes  Respiratory: [ ]shortness of breath;  [ ] cough, [ ]wheezing, [ ]chills, [ ]hemoptysis  Cardiovascular: [ ] chest pain, [ ]palpitations, [ ]dizziness,  [ ]leg swelling[ ]orthopnea[ ]PND  Gastrointestinal: [ ] abdominal pain, [ ]nausea, [ ]vomiting,  [ ]diarrhea [ ]Constipation [ ]Melena  Genitourinary: [ ] dysuria, [ ] hematuria [ ]Junior  Neurologic: [ ] headaches [ ] tremors[ ]weakness [ ]Paralysis Right[ ] Left[ ]  Skin: [ ] itching, [ ]burning, [ ] rashes  Endocrine: [ ] heat or cold intolerance  Musculoskeletal: [ ] joint pain or swelling; [ ] muscle, back, or extremity pain  Psychiatric: [ ] depression, [ ]anxiety, [ ]mood swings, or [ ]difficulty sleeping  Hematologic: [ ] easy bruising, [ ] bleeding gums    [ ] All remaining systems negative except as per above.   [ ]Unable to obtain.  [x] No change in ROS since admission      Vital Signs Last 24 Hrs  T(C): 37.7 (21 Jan 2023 13:10), Max: 37.7 (21 Jan 2023 13:10)  T(F): 99.8 (21 Jan 2023 13:10), Max: 99.8 (21 Jan 2023 13:10)  HR: 78 (21 Jan 2023 13:10) (72 - 94)  BP: 141/66 (21 Jan 2023 13:10) (128/61 - 141/66)  BP(mean): --  RR: 18 (21 Jan 2023 13:10) (18 - 20)  SpO2: 95% (21 Jan 2023 13:10) (95% - 100%)    Parameters below as of 21 Jan 2023 13:10  Patient On (Oxygen Delivery Method): nasal cannula  O2 Flow (L/min): 2    I&O's Summary    20 Jan 2023 07:01  -  21 Jan 2023 07:00  --------------------------------------------------------  IN: 200 mL / OUT: 1350 mL / NET: -1150 mL        PHYSICAL EXAM:  General: No acute distress BMI-  HEENT: EOMI, PERRL  Neck: Supple, [ ] JVD  Lungs: Equal air entry bilaterally; [ ] rales [ ] wheezing [ ] rhonchi  Heart: Regular rate and rhythm; [x ] murmur   2/6 [ x] systolic [ ] diastolic [ ] radiation[ ] rubs [ ]  gallops  Abdomen: Nontender, bowel sounds present  Extremities: No clubbing, cyanosis, [ ] edema [ ]Pulses  equal and intact  Nervous system:  Alert & Oriented X3, no focal deficits  Psychiatric: Normal affect  Skin: No rashes or lesions    LABS:  01-21    135  |  89<L>  |  138<H>  ----------------------------<  110<H>  4.4   |  31  |  2.48<H>    Ca    9.7      21 Jan 2023 06:00  Phos  3.7     01-20  Mg     2.60     01-20      Creatinine Trend: 2.48<--, 2.37<--, 2.19<--, 1.92<--, 2.18<--, 2.03<--                        11.0   9.57  )-----------( 222      ( 21 Jan 2023 06:00 )             35.8     PT/INR - ( 21 Jan 2023 06:00 )   PT: 31.8 sec;   INR: 2.71 ratio         PTT - ( 21 Jan 2023 16:07 )  PTT:43.5 sec

## 2023-01-21 NOTE — PROGRESS NOTE ADULT - SUBJECTIVE AND OBJECTIVE BOX
[facility] NEPHROLOGY- PROGRESS NOTE    72 year old Female with history of COPD, CHF presents with weakness. Nephrology consulted for elevated Scr.    REVIEW OF SYSTEMS:  Gen: no fevers  Cards: no chest pain  Resp: + dyspnea improving  GI: no nausea or vomiting or diarrhea  Vascular: + LE edema improving    No Known Allergies      Hospital Medications: Medications reviewed      VITALS:  T(F): 98.2 (23 @ 06:30), Max: 98.6 (23 @ 21:30)  HR: 72 (23 @ 06:30)  BP: 128/61 (23 @ 06:30)  RR: 18 (23 @ 06:30)  SpO2: 100% (23 @ 06:30)  Wt(kg): --     @ 07:01  -   @ 07:00  --------------------------------------------------------  IN: 200 mL / OUT: 1350 mL / NET: -1150 mL        PHYSICAL EXAM:    Gen: NAD, calm  Cards: RRR, +S1/S2, no M/G/R  Resp: CTA B/L  GI: soft, NT/ND, NABS  Vascular: trace+ RLE edema > no LLE edema    LABS:      135  |  89<L>  |  138<H>  ----------------------------<  110<H>  4.4   |  31  |  2.48<H>    Ca    9.7      2023 06:00  Phos  3.7       Mg     2.60           Creatinine Trend: 2.48 <--, 2.37 <--, 2.19 <--, 1.92 <--, 2.18 <--, 2.03 <--, 1.86 <--                        11.0   9.57  )-----------( 222      ( 2023 06:00 )             35.8     Urine Studies:  Urinalysis Basic - ( 2023 17:54 )    Color: Colorless / Appearance: Clear / S.011 / pH:   Gluc:  / Ketone: Negative  / Bili: Negative / Urobili: <2 mg/dL   Blood:  / Protein: Negative / Nitrite: Negative   Leuk Esterase: Negative / RBC: 1 /HPF / WBC 1 /HPF   Sq Epi:  / Non Sq Epi:  / Bacteria: Negative      Creatinine, Random Urine: 17 mg/dL ( @ 17:54)

## 2023-01-21 NOTE — PROGRESS NOTE ADULT - ASSESSMENT
72F with h/o COPD, pHTN, mech MVR on coumadin, Afib/flutter, MIGUEL presented with worsening SOB from baseline. On admission concern for COPD exacerbation. CXR is concerning for CHF. admit for management.       #  COPD exacerbation.   COPD exacerbation - 2L NC SpO2 91%  - s/p IV solumedrol, switched to PO 1/18   - BiPAP ordered as per pulm  - Monitor FS while on steroids    # Afib/mechanical valve - Goal INR 2.5-3.5  - Subtherapeutic INR on admission because held coumadin for 3 days prior 2/2 epistaxis  - s/p Heparin gtt to coumadin bridge  - Coumadin 5mg dosed 1/18, INR 2.7    # Diastolic CHF, acute on chronic.   trend 2.0-3.0 for most medical and surgical thromboembolic states.  2.0-3.0 for atrial fibrillation.  2.0-3.0 for bileaflet mechanical valve in aortic position.  2.5-3.5 for mechanical heart valves.  Chest 2004:126:[id number].  The presence of direct thrombin inhibitors(argatroban,refludan may falsely increase results,ratio.bumex, now on Bumex gtt (cxr with increased vascular congestion)   - strict I&O - patient using commode     # Afib.   h/o Afib and Aflutter s/p ablation and recent DCCV.   c/w Amiodarone.   c/w warfarin for AC.  trend INR    # HTN - Hydralazine

## 2023-01-21 NOTE — PROVIDER CONTACT NOTE (CRITICAL VALUE NOTIFICATION) - BACKGROUND
72y F. admitted for COPD
Patient has past medical history of DM, COPD, HTN. Patient was admitted to hospital for COPD exacerbation.

## 2023-01-22 LAB
ANION GAP SERPL CALC-SCNC: 12 MMOL/L — SIGNIFICANT CHANGE UP (ref 7–14)
APTT BLD: 52.7 SEC — HIGH (ref 27–36.3)
BUN SERPL-MCNC: 140 MG/DL — HIGH (ref 7–23)
CALCIUM SERPL-MCNC: 9.4 MG/DL — SIGNIFICANT CHANGE UP (ref 8.4–10.5)
CHLORIDE SERPL-SCNC: 89 MMOL/L — LOW (ref 98–107)
CO2 SERPL-SCNC: 32 MMOL/L — HIGH (ref 22–31)
CREAT SERPL-MCNC: 2.55 MG/DL — HIGH (ref 0.5–1.3)
EGFR: 19 ML/MIN/1.73M2 — LOW
GLUCOSE BLDC GLUCOMTR-MCNC: 134 MG/DL — HIGH (ref 70–99)
GLUCOSE BLDC GLUCOMTR-MCNC: 162 MG/DL — HIGH (ref 70–99)
GLUCOSE BLDC GLUCOMTR-MCNC: 220 MG/DL — HIGH (ref 70–99)
GLUCOSE BLDC GLUCOMTR-MCNC: 352 MG/DL — HIGH (ref 70–99)
GLUCOSE SERPL-MCNC: 263 MG/DL — HIGH (ref 70–99)
HCT VFR BLD CALC: 33.7 % — LOW (ref 34.5–45)
HGB BLD-MCNC: 10.5 G/DL — LOW (ref 11.5–15.5)
MCHC RBC-ENTMCNC: 29.7 PG — SIGNIFICANT CHANGE UP (ref 27–34)
MCHC RBC-ENTMCNC: 31.2 GM/DL — LOW (ref 32–36)
MCV RBC AUTO: 95.5 FL — SIGNIFICANT CHANGE UP (ref 80–100)
NRBC # BLD: 0 /100 WBCS — SIGNIFICANT CHANGE UP (ref 0–0)
NRBC # FLD: 0 K/UL — SIGNIFICANT CHANGE UP (ref 0–0)
PLATELET # BLD AUTO: 210 K/UL — SIGNIFICANT CHANGE UP (ref 150–400)
POTASSIUM SERPL-MCNC: 4.9 MMOL/L — SIGNIFICANT CHANGE UP (ref 3.5–5.3)
POTASSIUM SERPL-SCNC: 4.9 MMOL/L — SIGNIFICANT CHANGE UP (ref 3.5–5.3)
RBC # BLD: 3.53 M/UL — LOW (ref 3.8–5.2)
RBC # FLD: 15.1 % — HIGH (ref 10.3–14.5)
SODIUM SERPL-SCNC: 133 MMOL/L — LOW (ref 135–145)
WBC # BLD: 11.14 K/UL — HIGH (ref 3.8–10.5)
WBC # FLD AUTO: 11.14 K/UL — HIGH (ref 3.8–10.5)

## 2023-01-22 RX ORDER — WARFARIN SODIUM 2.5 MG/1
5 TABLET ORAL ONCE
Refills: 0 | Status: COMPLETED | OUTPATIENT
Start: 2023-01-22 | End: 2023-01-22

## 2023-01-22 RX ADMIN — Medication 3 MILLILITER(S): at 03:55

## 2023-01-22 RX ADMIN — Medication 3 MILLILITER(S): at 16:52

## 2023-01-22 RX ADMIN — LIDOCAINE 1 PATCH: 4 CREAM TOPICAL at 19:48

## 2023-01-22 RX ADMIN — Medication 50 MILLIGRAM(S): at 22:18

## 2023-01-22 RX ADMIN — Medication 10 UNIT(S): at 18:15

## 2023-01-22 RX ADMIN — LIDOCAINE 1 PATCH: 4 CREAM TOPICAL at 19:47

## 2023-01-22 RX ADMIN — PANTOPRAZOLE SODIUM 40 MILLIGRAM(S): 20 TABLET, DELAYED RELEASE ORAL at 07:18

## 2023-01-22 RX ADMIN — Medication 50 MILLIGRAM(S): at 05:30

## 2023-01-22 RX ADMIN — HEPARIN SODIUM 800 UNIT(S)/HR: 5000 INJECTION INTRAVENOUS; SUBCUTANEOUS at 07:31

## 2023-01-22 RX ADMIN — INSULIN GLARGINE 25 UNIT(S): 100 INJECTION, SOLUTION SUBCUTANEOUS at 23:18

## 2023-01-22 RX ADMIN — Medication 10: at 13:33

## 2023-01-22 RX ADMIN — LIDOCAINE 1 PATCH: 4 CREAM TOPICAL at 12:37

## 2023-01-22 RX ADMIN — Medication 650 MILLIGRAM(S): at 00:54

## 2023-01-22 RX ADMIN — HEPARIN SODIUM 800 UNIT(S)/HR: 5000 INJECTION INTRAVENOUS; SUBCUTANEOUS at 19:24

## 2023-01-22 RX ADMIN — Medication 325 MILLIGRAM(S): at 12:38

## 2023-01-22 RX ADMIN — Medication 40 MILLIGRAM(S): at 05:30

## 2023-01-22 RX ADMIN — SIMVASTATIN 10 MILLIGRAM(S): 20 TABLET, FILM COATED ORAL at 22:18

## 2023-01-22 RX ADMIN — Medication 10 UNIT(S): at 09:04

## 2023-01-22 RX ADMIN — Medication 50 MILLIGRAM(S): at 12:37

## 2023-01-22 RX ADMIN — Medication 3 MILLILITER(S): at 10:06

## 2023-01-22 RX ADMIN — Medication 10 UNIT(S): at 13:33

## 2023-01-22 RX ADMIN — Medication 2: at 18:14

## 2023-01-22 RX ADMIN — BUDESONIDE AND FORMOTEROL FUMARATE DIHYDRATE 2 PUFF(S): 160; 4.5 AEROSOL RESPIRATORY (INHALATION) at 21:36

## 2023-01-22 RX ADMIN — HEPARIN SODIUM 800 UNIT(S)/HR: 5000 INJECTION INTRAVENOUS; SUBCUTANEOUS at 05:23

## 2023-01-22 RX ADMIN — LIDOCAINE 1 PATCH: 4 CREAM TOPICAL at 01:00

## 2023-01-22 RX ADMIN — Medication 4: at 09:04

## 2023-01-22 RX ADMIN — ISOSORBIDE MONONITRATE 30 MILLIGRAM(S): 60 TABLET, EXTENDED RELEASE ORAL at 12:38

## 2023-01-22 RX ADMIN — AMIODARONE HYDROCHLORIDE 200 MILLIGRAM(S): 400 TABLET ORAL at 05:30

## 2023-01-22 RX ADMIN — WARFARIN SODIUM 5 MILLIGRAM(S): 2.5 TABLET ORAL at 22:18

## 2023-01-22 RX ADMIN — HEPARIN SODIUM 800 UNIT(S)/HR: 5000 INJECTION INTRAVENOUS; SUBCUTANEOUS at 09:03

## 2023-01-22 RX ADMIN — BUDESONIDE AND FORMOTEROL FUMARATE DIHYDRATE 2 PUFF(S): 160; 4.5 AEROSOL RESPIRATORY (INHALATION) at 09:05

## 2023-01-22 NOTE — PROGRESS NOTE ADULT - SUBJECTIVE AND OBJECTIVE BOX
PATIENT SEEN AND EXAMINED ON :- 1/22/23  DATE OF SERVICE:     1/22/23        Interim events noted,Labs ,Radiological studies and Cardiology tests reviewed .       HOSPITAL COURSE: HPI:  72F with PMH COPD/MIGUEL/OHS (home BiPAP/ 3L NC), pHTN, MVR on Coumadin AF s/p ablation and recent DCCV on 11/4/22 pw SOB. Patient reports she has been feeling progressive SOB over the past 1 week, associated with orthopniea and intermittent CP. Per patient CP has been chronic and no change in intensity. She received treatment and steroids in ED which help with her symptoms. denied fever, chill, coughing, abd pain, worsening LE edema or diarrhea.   of note, patient endorsed she has not taking warfarin for the past 3 days due to nose bleed.  (13 Jan 2023 11:48)      INTERIM EVENTS:Patient seen at bedside ,interim events noted.      PMH -reviewed admission note, no change since admission  HEART FAILURE: Acute[ ]Chronic[ ] Systolic[ ] Diastolic[ ] Combined Systolic and Diastolic[ ]  CAD[ ] CABG[ ] PCI[ ]  DEVICES[ ] PPM[ ] ICD[ ] ILR[ ]  ATRIAL FIBRILLATION[ ] Paroxysmal[ ] Permanent[ ] CHADS2-[  ]  MISAEL[ ] CKD1[ ] CKD2[ ] CKD3[ ] CKD4[ ] ESRD[ ]  COPD[ ] HTN[ ]   DM[ ] Type1[ ] Type 2[ ]   CVA[ ] Paresis[ ]    AMBULATION: Assisted[ ] Cane/walker[ ] Independent[ ]    MEDICATIONS  (STANDING):  acetaminophen   IVPB .. 1000 milliGRAM(s) IV Intermittent once  albuterol/ipratropium for Nebulization 3 milliLiter(s) Nebulizer every 6 hours  aMIOdarone    Tablet 200 milliGRAM(s) Oral daily  budesonide 160 MICROgram(s)/formoterol 4.5 MICROgram(s) Inhaler 2 Puff(s) Inhalation two times a day  dextrose 5%. 1000 milliLiter(s) (50 mL/Hr) IV Continuous <Continuous>  dextrose 5%. 1000 milliLiter(s) (100 mL/Hr) IV Continuous <Continuous>  dextrose 50% Injectable 25 Gram(s) IV Push once  dextrose 50% Injectable 12.5 Gram(s) IV Push once  dextrose 50% Injectable 25 Gram(s) IV Push once  ferrous    sulfate 325 milliGRAM(s) Oral daily  glucagon  Injectable 1 milliGRAM(s) IntraMuscular once  heparin  Infusion. 700 Unit(s)/Hr (7 mL/Hr) IV Continuous <Continuous>  hydrALAZINE 50 milliGRAM(s) Oral three times a day  insulin glargine Injectable (LANTUS) 25 Unit(s) SubCutaneous at bedtime  insulin lispro (ADMELOG) corrective regimen sliding scale   SubCutaneous three times a day before meals  insulin lispro (ADMELOG) corrective regimen sliding scale   SubCutaneous at bedtime  insulin lispro Injectable (ADMELOG) 10 Unit(s) SubCutaneous three times a day before meals  isosorbide   mononitrate ER Tablet (IMDUR) 30 milliGRAM(s) Oral daily  lidocaine   4% Patch 1 Patch Transdermal daily  lidocaine   4% Patch 1 Patch Transdermal daily  pantoprazole    Tablet 40 milliGRAM(s) Oral before breakfast  predniSONE   Tablet 40 milliGRAM(s) Oral daily  simvastatin 10 milliGRAM(s) Oral at bedtime  warfarin 5 milliGRAM(s) Oral once    MEDICATIONS  (PRN):  acetaminophen     Tablet .. 650 milliGRAM(s) Oral every 6 hours PRN Temp greater or equal to 38C (100.4F), Mild Pain (1 - 3), Moderate Pain (4 - 6)  aluminum hydroxide/magnesium hydroxide/simethicone Suspension 30 milliLiter(s) Oral every 4 hours PRN Dyspepsia  dextrose Oral Gel 15 Gram(s) Oral once PRN Blood Glucose LESS THAN 70 milliGRAM(s)/deciliter  heparin   Injectable 9000 Unit(s) IV Push every 6 hours PRN For aPTT less than 40  heparin   Injectable 4000 Unit(s) IV Push every 6 hours PRN For aPTT between 40 - 57  sodium chloride 0.65% Nasal 1 Spray(s) Both Nostrils three times a day PRN Nasal Congestion            REVIEW OF SYSTEMS:  Constitutional: [ ] fever, [ ]weight loss,  [ ]fatigue [ ]weight gain  Eyes: [ ] visual changes  Respiratory: [ ]shortness of breath;  [ ] cough, [ ]wheezing, [ ]chills, [ ]hemoptysis  Cardiovascular: [ ] chest pain, [ ]palpitations, [ ]dizziness,  [ ]leg swelling[ ]orthopnea[ ]PND  Gastrointestinal: [ ] abdominal pain, [ ]nausea, [ ]vomiting,  [ ]diarrhea [ ]Constipation [ ]Melena  Genitourinary: [ ] dysuria, [ ] hematuria [ ]Junior  Neurologic: [ ] headaches [ ] tremors[ ]weakness [ ]Paralysis Right[ ] Left[ ]  Skin: [ ] itching, [ ]burning, [ ] rashes  Endocrine: [ ] heat or cold intolerance  Musculoskeletal: [ ] joint pain or swelling; [ ] muscle, back, or extremity pain  Psychiatric: [ ] depression, [ ]anxiety, [ ]mood swings, or [ ]difficulty sleeping  Hematologic: [ ] easy bruising, [ ] bleeding gums    [ ] All remaining systems negative except as per above.   [ ]Unable to obtain.  [x] No change in ROS since admission      Vital Signs Last 24 Hrs  T(C): 36.4 (22 Jan 2023 17:00), Max: 36.8 (22 Jan 2023 05:52)  T(F): 97.6 (22 Jan 2023 17:00), Max: 98.3 (22 Jan 2023 05:52)  HR: 78 (22 Jan 2023 17:00) (69 - 89)  BP: 113/50 (22 Jan 2023 17:00) (113/50 - 141/69)  BP(mean): --  RR: 19 (22 Jan 2023 17:00) (19 - 20)  SpO2: 95% (22 Jan 2023 17:00) (92% - 100%)    Parameters below as of 22 Jan 2023 17:00  Patient On (Oxygen Delivery Method): nasal cannula  O2 Flow (L/min): 2    I&O's Summary    21 Jan 2023 07:01  -  22 Jan 2023 07:00  --------------------------------------------------------  IN: 0 mL / OUT: 600 mL / NET: -600 mL    22 Jan 2023 07:01  -  22 Jan 2023 20:14  --------------------------------------------------------  IN: 0 mL / OUT: 550 mL / NET: -550 mL        PHYSICAL EXAM:  General: No acute distress BMI-  HEENT: EOMI, PERRL  Neck: Supple, [ ] JVD  Lungs: Equal air entry bilaterally; [ ] rales [ ] wheezing [ ] rhonchi  Heart: Regular rate and rhythm; [x ] murmur   2/6 [ x] systolic [ ] diastolic [ ] radiation[ ] rubs [ ]  gallops  Abdomen: Nontender, bowel sounds present  Extremities: No clubbing, cyanosis, [ ] edema [ ]Pulses  equal and intact  Nervous system:  Alert & Oriented X3, no focal deficits  Psychiatric: Normal affect  Skin: No rashes or lesions    LABS:  01-22    133<L>  |  89<L>  |  140<H>  ----------------------------<  263<H>  4.9   |  32<H>  |  2.55<H>    Ca    9.4      22 Jan 2023 01:20      Creatinine Trend: 2.55<--, 2.48<--, 2.37<--, 2.19<--, 1.92<--, 2.18<--                        10.5   11.14 )-----------( 210      ( 22 Jan 2023 01:20 )             33.7     PT/INR - ( 21 Jan 2023 06:00 )   PT: 31.8 sec;   INR: 2.71 ratio         PTT - ( 22 Jan 2023 02:50 )  PTT:52.7 sec

## 2023-01-22 NOTE — PROGRESS NOTE ADULT - SUBJECTIVE AND OBJECTIVE BOX
SUBJECTIVE / OVERNIGHT EVENTS:pt seen and examined  23     MEDICATIONS  (STANDING):  acetaminophen   IVPB .. 1000 milliGRAM(s) IV Intermittent once  albuterol/ipratropium for Nebulization 3 milliLiter(s) Nebulizer every 6 hours  aMIOdarone    Tablet 200 milliGRAM(s) Oral daily  budesonide 160 MICROgram(s)/formoterol 4.5 MICROgram(s) Inhaler 2 Puff(s) Inhalation two times a day  dextrose 5%. 1000 milliLiter(s) (100 mL/Hr) IV Continuous <Continuous>  dextrose 5%. 1000 milliLiter(s) (50 mL/Hr) IV Continuous <Continuous>  dextrose 50% Injectable 25 Gram(s) IV Push once  dextrose 50% Injectable 12.5 Gram(s) IV Push once  dextrose 50% Injectable 25 Gram(s) IV Push once  ferrous    sulfate 325 milliGRAM(s) Oral daily  glucagon  Injectable 1 milliGRAM(s) IntraMuscular once  heparin  Infusion. 700 Unit(s)/Hr (7 mL/Hr) IV Continuous <Continuous>  hydrALAZINE 50 milliGRAM(s) Oral three times a day  insulin glargine Injectable (LANTUS) 25 Unit(s) SubCutaneous at bedtime  insulin lispro (ADMELOG) corrective regimen sliding scale   SubCutaneous three times a day before meals  insulin lispro (ADMELOG) corrective regimen sliding scale   SubCutaneous at bedtime  insulin lispro Injectable (ADMELOG) 10 Unit(s) SubCutaneous three times a day before meals  isosorbide   mononitrate ER Tablet (IMDUR) 30 milliGRAM(s) Oral daily  lidocaine   4% Patch 1 Patch Transdermal daily  lidocaine   4% Patch 1 Patch Transdermal daily  pantoprazole    Tablet 40 milliGRAM(s) Oral before breakfast  predniSONE   Tablet 40 milliGRAM(s) Oral daily  simvastatin 10 milliGRAM(s) Oral at bedtime    MEDICATIONS  (PRN):  acetaminophen     Tablet .. 650 milliGRAM(s) Oral every 6 hours PRN Temp greater or equal to 38C (100.4F), Mild Pain (1 - 3), Moderate Pain (4 - 6)  aluminum hydroxide/magnesium hydroxide/simethicone Suspension 30 milliLiter(s) Oral every 4 hours PRN Dyspepsia  dextrose Oral Gel 15 Gram(s) Oral once PRN Blood Glucose LESS THAN 70 milliGRAM(s)/deciliter  heparin   Injectable 9000 Unit(s) IV Push every 6 hours PRN For aPTT less than 40  heparin   Injectable 4000 Unit(s) IV Push every 6 hours PRN For aPTT between 40 - 57  sodium chloride 0.65% Nasal 1 Spray(s) Both Nostrils three times a day PRN Nasal Congestion    Vital Signs Last 24 Hrs  T(C): 36.9 (23 @ 21:53), Max: 36.9 (23 @ 21:53)  T(F): 98.4 (23 @ 21:53), Max: 98.4 (23 @ 21:53)  HR: 68 (23 @ 21:53) (68 - 89)  BP: 119/50 (23 @ 21:53) (113/50 - 141/69)  BP(mean): --  RR: 19 (23 @ 21:53) (19 - 20)  SpO2: 99% (23 @ 21:53) (92% - 100%)          Constitutional: No fever, fatigue  Skin: No rash.  Eyes: No recent vision problems or eye pain.  ENT: No congestion, ear pain, or sore throat.  Cardiovascular: No chest pain or palpation.  Respiratory: No cough, shortness of breath, congestion, or wheezing.  Gastrointestinal: No abdominal pain, nausea, vomiting, or diarrhea.  Genitourinary: No dysuria.  Musculoskeletal: No joint swelling.  Neurologic: No headache.    PHYSICAL EXAM:  GENERAL: NAD  EYES: EOMI, PERRLA  NECK: Supple, No JVD  CHEST/LUNG: dec breath sounds at bases  HEART:  S1 , S2 +  ABDOMEN: soft, bs+  EXTREMITIES:  edema+  NEUROLOGY:alert awake oriented     LABS:      133<L>  |  89<L>  |  140<H>  ----------------------------<  263<H>  4.9   |  32<H>  |  2.55<H>    Ca    9.4      2023 01:20      Creatinine Trend: 2.55 <--, 2.48 <--, 2.37 <--, 2.19 <--, 1.92 <--, 2.18 <--, 2.03 <--                        10.5   11.14 )-----------( 210      ( 2023 01:20 )             33.7     Urine Studies:  Urinalysis Basic - ( 2023 17:54 )    Color: Colorless / Appearance: Clear / S.011 / pH:   Gluc:  / Ketone: Negative  / Bili: Negative / Urobili: <2 mg/dL   Blood:  / Protein: Negative / Nitrite: Negative   Leuk Esterase: Negative / RBC: 1 /HPF / WBC 1 /HPF   Sq Epi:  / Non Sq Epi:  / Bacteria: Negative      Creatinine, Random Urine: 17 mg/dL ( @ 17:54)            PT/INR - ( 2023 06:00 )   PT: 31.8 sec;   INR: 2.71 ratio         PTT - ( 2023 02:50 )  PTT:52.7 sec    PT/INR - ( 2023 06:00 )   PT: 31.8 sec;   INR: 2.71 ratio         PTT - ( 2023 16:07 )  PTT:43.5 sec    RADIOLOGY & ADDITIONAL TESTS:    Imaging Personally Reviewed:yes    Consultant(s) Notes Reviewed:  yes    Care Discussed with Consultants/Other Providers:yes

## 2023-01-22 NOTE — PROGRESS NOTE ADULT - ASSESSMENT
72F with h/o COPD, pHTN, mech MVR on coumadin, Afib/flutter, MIGUEL presented with worsening SOB from baseline. On admission concern for COPD exacerbation. CXR is concerning for CHF. admit for management.       #  COPD exacerbation.   COPD exacerbation - 2L NC SpO2 91%  - s/p IV solumedrol, switched to PO 1/18   - BiPAP ordered as per pulm  - Monitor FS while on steroids    # Afib/mechanical valve - Goal INR 2.5-3.5  - Subtherapeutic INR on admission because held coumadin for 3 days prior 2/2 epistaxis  - s/p Heparin gtt to coumadin bridge  - Coumadin 5mg dosed 1/18, INR 2.7    # Diastolic CHF, acute on chronic.   trend 2.0-3.0 for most medical and surgical thromboembolic states.  2.0-3.0 for atrial fibrillation.  2.0-3.0 for bileaflet mechanical valve in aortic position.  2.5-3.5 for mechanical heart valves.  Chest 2004:126:[id number].  The presence of direct thrombin inhibitors(argatroban,refludan may falsely increase results,ratio.bumex, now on Bumex gtt (cxr with increased vascular congestion)   - strict I&O - patient using commode     # Afib.   h/o Afib and Aflutter s/p ablation and recent DCCV.   c/w Amiodarone.   c/w warfarin for AC.  trend INR    # HTN - Hydralazine

## 2023-01-22 NOTE — PROGRESS NOTE ADULT - SUBJECTIVE AND OBJECTIVE BOX
[facility] NEPHROLOGY- PROGRESS NOTE    72 year old Female with history of COPD, CHF presents with weakness. Nephrology consulted for elevated Scr.    REVIEW OF SYSTEMS:  Gen: no fevers  Cards: no chest pain  Resp: + dyspnea resolved  GI: no nausea or vomiting or diarrhea  Vascular: + LE edema improving    No Known Allergies      Hospital Medications: Medications reviewed      VITALS:  T(F): 98.3 (23 @ 05:52), Max: 99.8 (23 @ 13:10)  HR: 69 (23 @ 05:52)  BP: 113/65 (23 @ 05:52)  RR: 20 (23 @ 05:52)  SpO2: 93% (23 @ 10:06)  Wt(kg): --     @ 07:01  -   @ 07:00  --------------------------------------------------------  IN: 0 mL / OUT: 600 mL / NET: -600 mL        PHYSICAL EXAM:    Gen: NAD, calm  Cards: RRR, +S1/S2, no M/G/R  Resp: CTA B/L  GI: soft, NT/ND, NABS  Vascular:  no LE edema    LABS:      133<L>  |  89<L>  |  140<H>  ----------------------------<  263<H>  4.9   |  32<H>  |  2.55<H>    Ca    9.4      2023 01:20      Creatinine Trend: 2.55 <--, 2.48 <--, 2.37 <--, 2.19 <--, 1.92 <--, 2.18 <--, 2.03 <--, 1.86 <--                        10.5   11.14 )-----------( 210      ( 2023 01:20 )             33.7     Urine Studies:  Urinalysis Basic - ( 2023 17:54 )    Color: Colorless / Appearance: Clear / S.011 / pH:   Gluc:  / Ketone: Negative  / Bili: Negative / Urobili: <2 mg/dL   Blood:  / Protein: Negative / Nitrite: Negative   Leuk Esterase: Negative / RBC: 1 /HPF / WBC 1 /HPF   Sq Epi:  / Non Sq Epi:  / Bacteria: Negative      Creatinine, Random Urine: 17 mg/dL ( @ 17:54)

## 2023-01-22 NOTE — PROGRESS NOTE ADULT - ASSESSMENT
72 year old Female with history of COPD, CHF presents with weakness. Nephrology consulted for elevated Scr.    1) MISAEL: likely hemodynamically mediated in setting of diuresis. Scr increasing for which bumex PO was discontinued (last dose on 1/21). UA bland. FeNa low. Bladder scan negative. Avoid nephrotoxins.    2) CKD-3b: Baseline Scr 1.6-1.8 thought to be secondary to DM. Outpatient CKD work up. Monitor electrolytes.    3) HTN with CKD: BP improving. Continue with current medications. Monitor BP.    4) LE edema: Improving s/p bumex gtt (discontinued on 1/19). Held bumex 2 mg PO daily due to contraction alkalosis. Will resume prior to d/c for maintenance. Prior TTE with mild to moderate global LVSD. Monitor UO.    5) Hypercalcemia: likely primary HPT exacerbated in setting of diuresis. Will add sensipar if hypercalcemia recurs. Monitor serum calcium.    6) L renal mass: Patient aware of L renal mass. Would ensure patient has Urology follow up.      [facility] NEPHROLOGY  Rodrigue Amador M.D.  Rob Perez D.O.  Ana Song M.D.  Lucrecia López, MSN, ANP-C    Telephone: [phone]  Facsimile: [phone]    [address] [place] [address]

## 2023-01-23 LAB
ANION GAP SERPL CALC-SCNC: 11 MMOL/L — SIGNIFICANT CHANGE UP (ref 7–14)
APTT BLD: 88.3 SEC — HIGH (ref 27–36.3)
BUN SERPL-MCNC: 121 MG/DL — HIGH (ref 7–23)
CALCIUM SERPL-MCNC: 9.6 MG/DL — SIGNIFICANT CHANGE UP (ref 8.4–10.5)
CHLORIDE SERPL-SCNC: 93 MMOL/L — LOW (ref 98–107)
CO2 SERPL-SCNC: 31 MMOL/L — SIGNIFICANT CHANGE UP (ref 22–31)
CREAT SERPL-MCNC: 2.32 MG/DL — HIGH (ref 0.5–1.3)
EGFR: 22 ML/MIN/1.73M2 — LOW
GLUCOSE BLDC GLUCOMTR-MCNC: 175 MG/DL — HIGH (ref 70–99)
GLUCOSE BLDC GLUCOMTR-MCNC: 180 MG/DL — HIGH (ref 70–99)
GLUCOSE BLDC GLUCOMTR-MCNC: 198 MG/DL — HIGH (ref 70–99)
GLUCOSE BLDC GLUCOMTR-MCNC: 235 MG/DL — HIGH (ref 70–99)
GLUCOSE BLDC GLUCOMTR-MCNC: 272 MG/DL — HIGH (ref 70–99)
GLUCOSE BLDC GLUCOMTR-MCNC: 303 MG/DL — HIGH (ref 70–99)
GLUCOSE BLDC GLUCOMTR-MCNC: 85 MG/DL — SIGNIFICANT CHANGE UP (ref 70–99)
GLUCOSE SERPL-MCNC: 285 MG/DL — HIGH (ref 70–99)
HCT VFR BLD CALC: 34.8 % — SIGNIFICANT CHANGE UP (ref 34.5–45)
HGB BLD-MCNC: 10.5 G/DL — LOW (ref 11.5–15.5)
INR BLD: 3.83 RATIO — HIGH (ref 0.88–1.16)
MAGNESIUM SERPL-MCNC: 2.9 MG/DL — HIGH (ref 1.6–2.6)
MCHC RBC-ENTMCNC: 29.8 PG — SIGNIFICANT CHANGE UP (ref 27–34)
MCHC RBC-ENTMCNC: 30.2 GM/DL — LOW (ref 32–36)
MCV RBC AUTO: 98.9 FL — SIGNIFICANT CHANGE UP (ref 80–100)
NRBC # BLD: 0 /100 WBCS — SIGNIFICANT CHANGE UP (ref 0–0)
NRBC # FLD: 0 K/UL — SIGNIFICANT CHANGE UP (ref 0–0)
PHOSPHATE SERPL-MCNC: 3.9 MG/DL — SIGNIFICANT CHANGE UP (ref 2.5–4.5)
PLATELET # BLD AUTO: 203 K/UL — SIGNIFICANT CHANGE UP (ref 150–400)
POTASSIUM SERPL-MCNC: 5.4 MMOL/L — HIGH (ref 3.5–5.3)
POTASSIUM SERPL-SCNC: 5.4 MMOL/L — HIGH (ref 3.5–5.3)
PROTHROM AB SERPL-ACNC: 45 SEC — HIGH (ref 10.5–13.4)
RBC # BLD: 3.52 M/UL — LOW (ref 3.8–5.2)
RBC # FLD: 15.1 % — HIGH (ref 10.3–14.5)
SODIUM SERPL-SCNC: 135 MMOL/L — SIGNIFICANT CHANGE UP (ref 135–145)
WBC # BLD: 8.04 K/UL — SIGNIFICANT CHANGE UP (ref 3.8–10.5)
WBC # FLD AUTO: 8.04 K/UL — SIGNIFICANT CHANGE UP (ref 3.8–10.5)

## 2023-01-23 RX ORDER — SODIUM ZIRCONIUM CYCLOSILICATE 10 G/10G
10 POWDER, FOR SUSPENSION ORAL ONCE
Refills: 0 | Status: COMPLETED | OUTPATIENT
Start: 2023-01-23 | End: 2023-01-23

## 2023-01-23 RX ADMIN — Medication 3 MILLILITER(S): at 20:53

## 2023-01-23 RX ADMIN — Medication 650 MILLIGRAM(S): at 22:49

## 2023-01-23 RX ADMIN — Medication 10 UNIT(S): at 09:24

## 2023-01-23 RX ADMIN — Medication 50 MILLIGRAM(S): at 21:40

## 2023-01-23 RX ADMIN — Medication 3 MILLILITER(S): at 03:22

## 2023-01-23 RX ADMIN — SIMVASTATIN 10 MILLIGRAM(S): 20 TABLET, FILM COATED ORAL at 21:41

## 2023-01-23 RX ADMIN — Medication 6: at 13:32

## 2023-01-23 RX ADMIN — BUDESONIDE AND FORMOTEROL FUMARATE DIHYDRATE 2 PUFF(S): 160; 4.5 AEROSOL RESPIRATORY (INHALATION) at 09:24

## 2023-01-23 RX ADMIN — HEPARIN SODIUM 800 UNIT(S)/HR: 5000 INJECTION INTRAVENOUS; SUBCUTANEOUS at 08:03

## 2023-01-23 RX ADMIN — Medication 10 UNIT(S): at 13:33

## 2023-01-23 RX ADMIN — Medication 325 MILLIGRAM(S): at 14:01

## 2023-01-23 RX ADMIN — BUDESONIDE AND FORMOTEROL FUMARATE DIHYDRATE 2 PUFF(S): 160; 4.5 AEROSOL RESPIRATORY (INHALATION) at 21:41

## 2023-01-23 RX ADMIN — INSULIN GLARGINE 25 UNIT(S): 100 INJECTION, SOLUTION SUBCUTANEOUS at 22:49

## 2023-01-23 RX ADMIN — LIDOCAINE 1 PATCH: 4 CREAM TOPICAL at 00:02

## 2023-01-23 RX ADMIN — Medication 10 UNIT(S): at 18:22

## 2023-01-23 RX ADMIN — Medication 40 MILLIGRAM(S): at 05:24

## 2023-01-23 RX ADMIN — SODIUM ZIRCONIUM CYCLOSILICATE 10 GRAM(S): 10 POWDER, FOR SUSPENSION ORAL at 16:02

## 2023-01-23 RX ADMIN — ISOSORBIDE MONONITRATE 30 MILLIGRAM(S): 60 TABLET, EXTENDED RELEASE ORAL at 14:01

## 2023-01-23 RX ADMIN — LIDOCAINE 1 PATCH: 4 CREAM TOPICAL at 14:02

## 2023-01-23 RX ADMIN — PANTOPRAZOLE SODIUM 40 MILLIGRAM(S): 20 TABLET, DELAYED RELEASE ORAL at 08:36

## 2023-01-23 RX ADMIN — LIDOCAINE 1 PATCH: 4 CREAM TOPICAL at 22:27

## 2023-01-23 RX ADMIN — LIDOCAINE 1 PATCH: 4 CREAM TOPICAL at 22:26

## 2023-01-23 RX ADMIN — Medication 3 MILLILITER(S): at 09:54

## 2023-01-23 RX ADMIN — AMIODARONE HYDROCHLORIDE 200 MILLIGRAM(S): 400 TABLET ORAL at 05:24

## 2023-01-23 RX ADMIN — Medication 50 MILLIGRAM(S): at 14:01

## 2023-01-23 RX ADMIN — Medication 4: at 18:21

## 2023-01-23 RX ADMIN — Medication 650 MILLIGRAM(S): at 23:16

## 2023-01-23 RX ADMIN — Medication 50 MILLIGRAM(S): at 05:22

## 2023-01-23 RX ADMIN — Medication 3 MILLILITER(S): at 16:35

## 2023-01-23 RX ADMIN — Medication 2: at 09:25

## 2023-01-23 NOTE — CHART NOTE - NSCHARTNOTEFT_GEN_A_CORE
Discussed CT chest with nephrology Dr. Perez, no pulmonary edema noted on CT, will stop Bumex gtt, will reassess pt on 1/20 prior to starting oral diuretics.
Unable to draw labs this afternoon, several unsuccessful attempts by RN and provider to check INR. Will dose Coumadin 5mg tonight, discussed with pharmacy.
[facility] MEDICINE COVERAGE - Medicine Subsequent Hospital Care Note    CC: Chest pressure  HPI/Subjective: Notified by RN, patient c/o chest pressure, pt describing her chest as "tight". Assessed patient at the bedside seen in some discomfort c/o 9/10 "chest tightness all over", onset 10 minutes prior. VSS. Patient denies any radiation of pain or any alleviating or exacerbating factors. Patient endorses that she has had chronic intermittent chest pain/tightness similar in nature to current episode.     ROS:  Denies fever, chills, diaphoresis, malaise, night sweats, generalized weakness, headache, changes in vision, dizziness, cough, sputum production, wheezing, hemoptysis, palpitations, shortness of breath, dyspnea on exertion, PND, dysphagia, new abdominal pain, nausea, vomiting, diarrhea, constipation, melena, hematochezia, dysuria, increased urinary frequency/urgency, hematuria, nocturia, numbness/weakness/tingling, any other complaints.    [ x ] I spoke with the attending, nurse, and family to obtain the history.    Vital Signs Last 24 Hrs  T(C): 36.4 (01-15-23 @ 18:22), Max: 36.9 (01-15-23 @ 12:51)  T(F): 97.6 (01-15-23 @ 18:22), Max: 98.4 (01-15-23 @ 12:51)  HR: 81 (01-15-23 @ 18:22) (54 - 81)  BP: 138/81 (01-15-23 @ 18:22) (136/62 - 157/89)  RR: 18 (01-15-23 @ 12:51) (18 - 18)  SpO2: 92% (01-15-23 @ 18:22) (92% - 99%) on (O2)    PHYSICAL EXAM:  Constitutional: Mild discomfort, otherwise NAD, well-developed, well-nourished  Ears, nose, Mouth, and Throat: normal external ears and nose, normal hearing, moist oral mucosa  Eyes: normal conjunctiva, EOMI, PEERL  Neck: supple, no JVD  Respiratory: Clear to auscultation bilaterally. No wheezes, rales or rhonchi. Normal respiratory effort  Cardiovascular: regular rate and rhythm, S1 and S2, no murmurs, rubs or gallops, no edema, 2+ peripheral pulses  Gastrointestinal: soft, nontender, nondistended, +bowel sounds, no hernia  Skin: warm, dry, no rash  Neurologic: sensation grossly intact, CN grossly intact, non-focal exam  Musculoskeletal: no clubbing, no cyanosis, no joint swelling  Psychiatric: A&Ox3, appropriate mood, affect    LABS:                        11.0   10.23 )-----------( 291      ( 15 Ruddy 2023 17:30 )             36.6     01-15    136  |  94<L>  |  110<H>  ----------------------------<  218<H>  5.0   |  29  |  1.86<H>    Ca    10.1      15 Ruddy 2023 17:30  Phos  4.2     01-15  Mg     2.40     01-15      PT/INR: PT: 16.1 sec | INR: 1.38 ratio (01-15-23 @ 17:30)  PTT: 99.8 sec (01-15-23 @ 17:30)  PT/INR: PT: x | INR: x (01-14-23 @ 21:00)  PTT: 67.3 sec (01-14-23 @ 21:00)  PT/INR: PT: x | INR: x (01-14-23 @ 14:21)  PTT: 97.5 sec (01-14-23 @ 14:21)  PT/INR: PT: 12.7 sec | INR: 1.09 ratio (01-14-23 @ 05:22)  PTT: 185.6 sec (01-14-23 @ 05:22)  PT/INR: PT: x | INR: x (01-13-23 @ 21:06)  PTT: >200 sec (01-13-23 @ 21:06)  PT/INR: PT: 12.3 sec | INR: 1.06 ratio (01-13-23 @ 08:45)  PTT: 32.8 sec (01-13-23 @ 08:45)    CARDIAC ENZYMES            Serum Pro-Brain Natriuretic Peptide: 2991 pg/mL (01-13-23 @ 08:45)  2922 pg/mL (01-12-23 @ 22:42)    D-Dimer Assay:     Urinanalysis Basic (01-15-23 @ 18:57):      Blood Gas Venous (01-15-23 @ 18:57):  pH: 7.33 | HCO3: 42 | pCO2: 80 | pO2: 40 | Lactate: 1.6      Blood Gas Arterial (01-15-23 @ 18:57):      CAPILLARY BLOOD GLUCOSE:  POCT Blood Glucose: 218 mg/dL (01-15-23 @ 17:18)  POCT Blood Glucose: 322 mg/dL (01-15-23 @ 12:29)  POCT Blood Glucose: 198 mg/dL (01-15-23 @ 08:50)      COVID PCR:  NotDetec (11-12-22 @ 10:38)  NotDetec (11-09-22 @ 11:48)  NotDetec (11-06-22 @ 08:09)      RADIOLOGY:    MEDICATIONS  (STANDING):  albuterol/ipratropium for Nebulization 3 milliLiter(s) Nebulizer every 6 hours  aMIOdarone    Tablet 200 milliGRAM(s) Oral daily  budesonide 160 MICROgram(s)/formoterol 4.5 MICROgram(s) Inhaler 2 Puff(s) Inhalation two times a day  buMETAnide Injectable 2 milliGRAM(s) IV Push two times a day  dextrose 5%. 1000 milliLiter(s) (50 mL/Hr) IV Continuous <Continuous>  dextrose 5%. 1000 milliLiter(s) (100 mL/Hr) IV Continuous <Continuous>  dextrose 50% Injectable 25 Gram(s) IV Push once  dextrose 50% Injectable 12.5 Gram(s) IV Push once  dextrose 50% Injectable 25 Gram(s) IV Push once  ferrous    sulfate 325 milliGRAM(s) Oral daily  glucagon  Injectable 1 milliGRAM(s) IntraMuscular once  heparin  Infusion.  Unit(s)/Hr (19 mL/Hr) IV Continuous <Continuous>  insulin glargine Injectable (LANTUS) 25 Unit(s) SubCutaneous at bedtime  insulin lispro (ADMELOG) corrective regimen sliding scale   SubCutaneous three times a day before meals  insulin lispro (ADMELOG) corrective regimen sliding scale   SubCutaneous at bedtime  insulin lispro Injectable (ADMELOG) 10 Unit(s) SubCutaneous three times a day before meals  isosorbide   mononitrate ER Tablet (IMDUR) 30 milliGRAM(s) Oral daily  lidocaine   4% Patch 1 Patch Transdermal daily  lidocaine   4% Patch 1 Patch Transdermal daily  methylPREDNISolone sodium succinate Injectable 40 milliGRAM(s) IV Push every 8 hours  pantoprazole    Tablet 40 milliGRAM(s) Oral before breakfast  simvastatin 10 milliGRAM(s) Oral at bedtime  warfarin 10 milliGRAM(s) Oral once    MEDICATIONS  (PRN):  acetaminophen     Tablet .. 650 milliGRAM(s) Oral every 6 hours PRN Temp greater or equal to 38C (100.4F), Mild Pain (1 - 3), Moderate Pain (4 - 6)  aluminum hydroxide/magnesium hydroxide/simethicone Suspension 30 milliLiter(s) Oral every 4 hours PRN Dyspepsia  dextrose Oral Gel 15 Gram(s) Oral once PRN Blood Glucose LESS THAN 70 milliGRAM(s)/deciliter  heparin   Injectable 9000 Unit(s) IV Push every 6 hours PRN For aPTT less than 40  heparin   Injectable 4000 Unit(s) IV Push every 6 hours PRN For aPTT between 40 - 57  sodium chloride 0.65% Nasal 1 Spray(s) Both Nostrils three times a day PRN Nasal Congestion    I&O's Summary    14 Jan 2023 07:01  -  15 Ruddy 2023 07:00  --------------------------------------------------------  IN: 1060 mL / OUT: 0 mL / NET: 1060 mL      I reviewed the above labs, radiology, medications, tests, telemetry, and EKG interpretation.    ASSESSMENT/PLAN:    - Dr. Newman made aware  - Cardiologist Dr. Merida consulted  - EKG ordered and reviewed with Dr. Newman and Dr. Merida  - cardiac enzymes ordered  - Prn maalox   - Duoneb x1   - aPTT borderline supratherapeutic, heparin gtt rate adjusted accordingly  - INR just resulted, coumadin dosed  - Will continue to monitor patient closely
Cardiologist Dr. Merida consulted, f/u recs
Labs reviewed, Cr downtrending, discussed with nephrology Dr. Perez, will cont to hold Bumex for today, plan to resume on discharge. K 5.4, will give 10g Lokelma.

## 2023-01-23 NOTE — PROVIDER CONTACT NOTE (OTHER) - RECOMMENDATIONS
ACP Traci Granger aware.
As per ACP, continue to monitor pt at this time.
EKG, cardiac enzymes
As per ACP, continue to monitor pt at this time.
notify provider

## 2023-01-23 NOTE — ED CDU PROVIDER INITIAL DAY NOTE - CHIEF COMPLAINT
- management per primary team  - remains on heparin infusion   The patient is a 67y Female complaining of epistaxis

## 2023-01-23 NOTE — PROGRESS NOTE ADULT - PROBLEM SELECTOR PROBLEM 6
MIGUEL treated with BiPAP

## 2023-01-23 NOTE — PROVIDER CONTACT NOTE (OTHER) - REASON
patient having nose bleed
pt c/o chest pressure/tightness
BP: 118/50, pt. asymptomatic.
Heart Rate
BP: 135/46, pt. asymptomatic.

## 2023-01-23 NOTE — PROGRESS NOTE ADULT - PROBLEM SELECTOR PLAN 3
has not taken warfarin in the past 3 days due to nose bleed.   recent ELIZABETH reviewed, no issues with valve opening.   check INR and PTT. if INR< 2.5 will need to start hep gtt.  patient reports warfarin 10mg qd at home.
has not taken warfarin in the past 3 days due to nose bleed.   recent ELIZABETH reviewed, no issues with valve opening.   cont ac
has not taken warfarin in the past 3 days due to nose bleed.   recent ELIZABETH reviewed, no issues with valve opening.   check INR and PTT. if INR< 2.5 will need to start hep gtt.  patient reports warfarin 10mg qd at home.

## 2023-01-23 NOTE — PROGRESS NOTE ADULT - ASSESSMENT
72F with h/o COPD, pHTN, mech MVR on coumadin, Afib/flutter, MIGUEL presented with worsening SOB from baseline. On admission concern for COPD exacerbation. CXR is concerning for CHF. admit for management.       #  COPD exacerbation.   COPD exacerbation - 2L NC SpO2 91%  - s/p IV solumedrol, switched to PO 1/18   - BiPAP ordered as per pulm  - Monitor FS while on steroids    # Afib/mechanical valve - Goal INR 2.5-3.5  - Subtherapeutic INR on admission because held coumadin for 3 days prior 2/2 epistaxis  - s/p Heparin gtt to coumadin bridge  - Coumadin held 1/23 for supratherapeutic INR 3.8     # Diastolic CHF, acute on chronic CHF   - s/p Bumex gtt stopped 1/19 as CT chest shows no pulm edema  .  Chest [id number].  The presence of direct thrombin inhibitors(argatroban,refludan may falsely increase results,ratio.bumex, now on Bumex gtt (cxr with increased vascular congestion)   - strict I&O - patient using commode     # HTN - cont Hydralazine       Dispo: Anticipate Home PT, Plan for D/C on Tuesday 1/24 with Home care   Pt cleared for discharge as per attg, Dr. Newman requests that we attempt labs on 1/24 for INR dosing, pt is hard stick notify attg if unable to obtain  Otherwise, INR check this Friday with her cardiologist - appt in D/C papers

## 2023-01-23 NOTE — PROGRESS NOTE ADULT - PROBLEM SELECTOR PLAN 6
c/w home nocturnal BiPAP.

## 2023-01-23 NOTE — PROGRESS NOTE ADULT - SUBJECTIVE AND OBJECTIVE BOX
PATIENT SEEN AND EXAMINED ON :- 1/23/23  DATE OF SERVICE:  1/23/23           Interim events noted,Labs ,Radiological studies and Cardiology tests reviewed        HOSPITAL COURSE: HPI:  72F with PMH COPD/MIGUEL/OHS (home BiPAP/ 3L NC), pHTN, MVR on Coumadin AF s/p ablation and recent DCCV on 11/4/22 pw SOB. Patient reports she has been feeling progressive SOB over the past 1 week, associated with orthopniea and intermittent CP. Per patient CP has been chronic and no change in intensity. She received treatment and steroids in ED which help with her symptoms. denied fever, chill, coughing, abd pain, worsening LE edema or diarrhea.   of note, patient endorsed she has not taking warfarin for the past 3 days due to nose bleed.  (13 Jan 2023 11:48)      INTERIM EVENTS:Patient seen at bedside ,interim events noted.      PMH -reviewed admission note, no change since admission  HEART FAILURE: Acute[ ]Chronic[ ] Systolic[ ] Diastolic[ ] Combined Systolic and Diastolic[ ]  CAD[ ] CABG[ ] PCI[ ]  DEVICES[ ] PPM[ ] ICD[ ] ILR[ ]  ATRIAL FIBRILLATION[ ] Paroxysmal[ ] Permanent[ ] CHADS2-[  ]  MISAEL[ ] CKD1[ ] CKD2[ ] CKD3[ ] CKD4[ ] ESRD[ ]  COPD[ ] HTN[ ]   DM[ ] Type1[ ] Type 2[ ]   CVA[ ] Paresis[ ]    AMBULATION: Assisted[ ] Cane/walker[ ] Independent[ ]    MEDICATIONS  (STANDING):  acetaminophen   IVPB .. 1000 milliGRAM(s) IV Intermittent once  albuterol/ipratropium for Nebulization 3 milliLiter(s) Nebulizer every 6 hours  aMIOdarone    Tablet 200 milliGRAM(s) Oral daily  budesonide 160 MICROgram(s)/formoterol 4.5 MICROgram(s) Inhaler 2 Puff(s) Inhalation two times a day  dextrose 5%. 1000 milliLiter(s) (50 mL/Hr) IV Continuous <Continuous>  dextrose 5%. 1000 milliLiter(s) (100 mL/Hr) IV Continuous <Continuous>  dextrose 50% Injectable 25 Gram(s) IV Push once  dextrose 50% Injectable 12.5 Gram(s) IV Push once  dextrose 50% Injectable 25 Gram(s) IV Push once  ferrous    sulfate 325 milliGRAM(s) Oral daily  glucagon  Injectable 1 milliGRAM(s) IntraMuscular once  hydrALAZINE 50 milliGRAM(s) Oral three times a day  insulin glargine Injectable (LANTUS) 25 Unit(s) SubCutaneous at bedtime  insulin lispro (ADMELOG) corrective regimen sliding scale   SubCutaneous three times a day before meals  insulin lispro (ADMELOG) corrective regimen sliding scale   SubCutaneous at bedtime  insulin lispro Injectable (ADMELOG) 10 Unit(s) SubCutaneous three times a day before meals  isosorbide   mononitrate ER Tablet (IMDUR) 30 milliGRAM(s) Oral daily  lidocaine   4% Patch 1 Patch Transdermal daily  lidocaine   4% Patch 1 Patch Transdermal daily  pantoprazole    Tablet 40 milliGRAM(s) Oral before breakfast  predniSONE   Tablet 40 milliGRAM(s) Oral daily  simvastatin 10 milliGRAM(s) Oral at bedtime    MEDICATIONS  (PRN):  acetaminophen     Tablet .. 650 milliGRAM(s) Oral every 6 hours PRN Temp greater or equal to 38C (100.4F), Mild Pain (1 - 3), Moderate Pain (4 - 6)  aluminum hydroxide/magnesium hydroxide/simethicone Suspension 30 milliLiter(s) Oral every 4 hours PRN Dyspepsia  dextrose Oral Gel 15 Gram(s) Oral once PRN Blood Glucose LESS THAN 70 milliGRAM(s)/deciliter  sodium chloride 0.65% Nasal 1 Spray(s) Both Nostrils three times a day PRN Nasal Congestion            REVIEW OF SYSTEMS:  Constitutional: [ ] fever, [ ]weight loss,  [ ]fatigue [ ]weight gain  Eyes: [ ] visual changes  Respiratory: [ ]shortness of breath;  [ ] cough, [ ]wheezing, [ ]chills, [ ]hemoptysis  Cardiovascular: [ ] chest pain, [ ]palpitations, [ ]dizziness,  [ ]leg swelling[ ]orthopnea[ ]PND  Gastrointestinal: [ ] abdominal pain, [ ]nausea, [ ]vomiting,  [ ]diarrhea [ ]Constipation [ ]Melena  Genitourinary: [ ] dysuria, [ ] hematuria [ ]Junior  Neurologic: [ ] headaches [ ] tremors[ ]weakness [ ]Paralysis Right[ ] Left[ ]  Skin: [ ] itching, [ ]burning, [ ] rashes  Endocrine: [ ] heat or cold intolerance  Musculoskeletal: [ ] joint pain or swelling; [ ] muscle, back, or extremity pain  Psychiatric: [ ] depression, [ ]anxiety, [ ]mood swings, or [ ]difficulty sleeping  Hematologic: [ ] easy bruising, [ ] bleeding gums    [ ] All remaining systems negative except as per above.   [ ]Unable to obtain.  [x] No change in ROS since admission      Vital Signs Last 24 Hrs  T(C): 36.3 (23 Jan 2023 18:00), Max: 37.1 (23 Jan 2023 13:17)  T(F): 97.4 (23 Jan 2023 18:00), Max: 98.7 (23 Jan 2023 13:17)  HR: 67 (23 Jan 2023 18:00) (67 - 81)  BP: 118/50 (23 Jan 2023 18:00) (116/69 - 135/46)  BP(mean): --  RR: 18 (23 Jan 2023 18:00) (17 - 19)  SpO2: 98% (23 Jan 2023 18:00) (95% - 100%)    Parameters below as of 23 Jan 2023 16:38  Patient On (Oxygen Delivery Method): room air      I&O's Summary    22 Jan 2023 07:01  -  23 Jan 2023 07:00  --------------------------------------------------------  IN: 0 mL / OUT: 550 mL / NET: -550 mL    23 Jan 2023 07:01  -  23 Jan 2023 20:28  --------------------------------------------------------  IN: 640 mL / OUT: 250 mL / NET: 390 mL        PHYSICAL EXAM:  General: No acute distress BMI-  HEENT: EOMI, PERRL  Neck: Supple, [ ] JVD  Lungs: Equal air entry bilaterally; [ ] rales [ ] wheezing [ ] rhonchi  Heart: Regular rate and rhythm; [x ] murmur   2/6 [ x] systolic [ ] diastolic [ ] radiation[ ] rubs [ ]  gallops  Abdomen: Nontender, bowel sounds present  Extremities: No clubbing, cyanosis, [ ] edema [ ]Pulses  equal and intact  Nervous system:  Alert & Oriented X3, no focal deficits  Psychiatric: Normal affect  Skin: No rashes or lesions    LABS:  01-23    135  |  93<L>  |  121<H>  ----------------------------<  285<H>  5.4<H>   |  31  |  2.32<H>    Ca    9.6      23 Jan 2023 13:17  Phos  3.9     01-23  Mg     2.90     01-23      Creatinine Trend: 2.32<--, 2.55<--, 2.48<--, 2.37<--, 2.19<--, 1.92<--                        10.5   8.04  )-----------( 203      ( 23 Jan 2023 13:17 )             34.8     PT/INR - ( 23 Jan 2023 13:17 )   PT: 45.0 sec;   INR: 3.83 ratio         PTT - ( 23 Jan 2023 13:17 )  PTT:88.3 sec

## 2023-01-23 NOTE — PROGRESS NOTE ADULT - SUBJECTIVE AND OBJECTIVE BOX
SUBJECTIVE / OVERNIGHT EVENTS:pt seen and examined  23     MEDICATIONS  (STANDING):  acetaminophen   IVPB .. 1000 milliGRAM(s) IV Intermittent once  albuterol/ipratropium for Nebulization 3 milliLiter(s) Nebulizer every 6 hours  aMIOdarone    Tablet 200 milliGRAM(s) Oral daily  budesonide 160 MICROgram(s)/formoterol 4.5 MICROgram(s) Inhaler 2 Puff(s) Inhalation two times a day  dextrose 5%. 1000 milliLiter(s) (50 mL/Hr) IV Continuous <Continuous>  dextrose 5%. 1000 milliLiter(s) (100 mL/Hr) IV Continuous <Continuous>  dextrose 50% Injectable 25 Gram(s) IV Push once  dextrose 50% Injectable 12.5 Gram(s) IV Push once  dextrose 50% Injectable 25 Gram(s) IV Push once  ferrous    sulfate 325 milliGRAM(s) Oral daily  glucagon  Injectable 1 milliGRAM(s) IntraMuscular once  hydrALAZINE 50 milliGRAM(s) Oral three times a day  insulin glargine Injectable (LANTUS) 25 Unit(s) SubCutaneous at bedtime  insulin lispro (ADMELOG) corrective regimen sliding scale   SubCutaneous at bedtime  insulin lispro (ADMELOG) corrective regimen sliding scale   SubCutaneous three times a day before meals  insulin lispro Injectable (ADMELOG) 10 Unit(s) SubCutaneous three times a day before meals  isosorbide   mononitrate ER Tablet (IMDUR) 30 milliGRAM(s) Oral daily  lidocaine   4% Patch 1 Patch Transdermal daily  lidocaine   4% Patch 1 Patch Transdermal daily  pantoprazole    Tablet 40 milliGRAM(s) Oral before breakfast  predniSONE   Tablet 40 milliGRAM(s) Oral daily  simvastatin 10 milliGRAM(s) Oral at bedtime    MEDICATIONS  (PRN):  acetaminophen     Tablet .. 650 milliGRAM(s) Oral every 6 hours PRN Temp greater or equal to 38C (100.4F), Mild Pain (1 - 3), Moderate Pain (4 - 6)  aluminum hydroxide/magnesium hydroxide/simethicone Suspension 30 milliLiter(s) Oral every 4 hours PRN Dyspepsia  dextrose Oral Gel 15 Gram(s) Oral once PRN Blood Glucose LESS THAN 70 milliGRAM(s)/deciliter  sodium chloride 0.65% Nasal 1 Spray(s) Both Nostrils three times a day PRN Nasal Congestion    Vital Signs Last 24 Hrs  T(C): 36.3 (23 @ 18:00), Max: 37.1 (23 @ 13:17)  T(F): 97.4 (23 @ 18:00), Max: 98.7 (23 @ 13:17)  HR: 69 (23 @ 20:53) (67 - 81)  BP: 118/50 (23 @ 18:00) (116/69 - 135/46)  BP(mean): --  RR: 18 (23 @ 18:00) (17 - 19)  SpO2: 98% (23 @ 20:53) (95% - 100%)            Constitutional: No fever, fatigue  Skin: No rash.  Eyes: No recent vision problems or eye pain.  ENT: No congestion, ear pain, or sore throat.  Cardiovascular: No chest pain or palpation.  Respiratory: No cough, shortness of breath, congestion, or wheezing.  Gastrointestinal: No abdominal pain, nausea, vomiting, or diarrhea.  Genitourinary: No dysuria.  Musculoskeletal: No joint swelling.  Neurologic: No headache.    PHYSICAL EXAM:  GENERAL: NAD  EYES: EOMI, PERRLA  NECK: Supple, No JVD  CHEST/LUNG: dec breath sounds at bases  HEART:  S1 , S2 +  ABDOMEN: soft, bs+  EXTREMITIES:  edema+  NEUROLOGY:alert awake oriented     LABS:      135  |  93<L>  |  121<H>  ----------------------------<  285<H>  5.4<H>   |  31  |  2.32<H>    Ca    9.6      2023 13:17  Phos  3.9       Mg     2.90           Creatinine Trend: 2.32 <--, 2.55 <--, 2.48 <--, 2.37 <--, 2.19 <--, 1.92 <--, 2.18 <--                        10.5   8.04  )-----------( 203      ( 2023 13:17 )             34.8     Urine Studies:  Urinalysis Basic - ( 2023 17:54 )    Color: Colorless / Appearance: Clear / S.011 / pH:   Gluc:  / Ketone: Negative  / Bili: Negative / Urobili: <2 mg/dL   Blood:  / Protein: Negative / Nitrite: Negative   Leuk Esterase: Negative / RBC: 1 /HPF / WBC 1 /HPF   Sq Epi:  / Non Sq Epi:  / Bacteria: Negative      Creatinine, Random Urine: 17 mg/dL ( @ 17:54)            PT/INR - ( 2023 13:17 )   PT: 45.0 sec;   INR: 3.83 ratio         PTT - ( 2023 13:17 )  PTT:88.3 sec      PT/INR - ( 2023 06:00 )   PT: 31.8 sec;   INR: 2.71 ratio         PTT - ( 2023 02:50 )  PTT:52.7 sec    PT/INR - ( 2023 06:00 )   PT: 31.8 sec;   INR: 2.71 ratio         PTT - ( 2023 16:07 )  PTT:43.5 sec    RADIOLOGY & ADDITIONAL TESTS:    Imaging Personally Reviewed:yes    Consultant(s) Notes Reviewed:  yes    Care Discussed with Consultants/Other Providers:yes

## 2023-01-23 NOTE — PROGRESS NOTE ADULT - PROBLEM SELECTOR PROBLEM 3
H/O mitral valve replacement with mechanical valve

## 2023-01-23 NOTE — PROGRESS NOTE ADULT - PROBLEM SELECTOR PLAN 7
DVT: therapeutic AC  Diet: Regular/DASH/DM  Dispo: Pending improvement
DVT: therapeutic AC  dc pt home with home care services

## 2023-01-23 NOTE — PROVIDER CONTACT NOTE (OTHER) - ACTION/TREATMENT ORDERED:
EKG, cardiac enzymes, maalox, cardio cnsult
As per ACP, continue to monitor pt at this time.
continue to monitor patient
As per ACP, continue to monitor pt at this time.
As per ACP Traci Granger instruct patient to hold pressure for 10-15 min, if nose bleed continues will reassess continuation for heparin drip.

## 2023-01-23 NOTE — PHYSICAL THERAPY INITIAL EVALUATION ADULT - SITTING BALANCE: STATIC
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    She has been seen by Oncology, they are planning immunemodulator therapy.    The staples in the buttock were removed.  Steri-Strips were placed.   Incision is clean dry and intact without evidence of infection.     Follow-up with me p.r.n.  
good balance

## 2023-01-23 NOTE — PROGRESS NOTE ADULT - ASSESSMENT
72 year old Female with history of COPD, CHF presents with weakness. Nephrology consulted for elevated Scr.    1) MISAEL: likely hemodynamically mediated in setting of diuresis. Scr increasing for which Bumex was discontinued. Follow up renal panel today. Azotemia in part due to steroids. UA bland. FeNa low. Bladder scan negative. Avoid nephrotoxins.    2) CKD-3b: Baseline Scr 1.6-1.8 thought to be secondary to DM. Outpatient CKD work up. Monitor electrolytes.    3) HTN with CKD: BP controlled. Continue with current medications. Monitor BP.    4) LE edema: Improved with Bumex discontinued due to MISAEL and contraction alkalosis. Will need to resume at lower dose (2 mg PO daily) on discharge. CT chest negative for pulmonary edema. Prior TTE with mild to moderate global LVSD. Monitor UO.    5) Hypercalcemia: likely primary HPT exacerbated in setting of diuresis. Will add sensipar if hypercalcemia recurs. Monitor serum calcium.    6) L renal mass: Patient aware of L renal mass. Would ensure patient has Urology follow up.      [facility] NEPHROLOGY  Rodrigue Amador M.D.  Rob Perez D.O.  Ana Song M.D.  Lucrecia López, MSN, ANP-C    Telephone: [phone]  Facsimile: [phone]    [id number] [place] [address]

## 2023-01-23 NOTE — PROGRESS NOTE ADULT - NUTRITIONAL ASSESSMENT
This patient has been assessed with a concern for Malnutrition and has been determined to have a diagnosis/diagnoses of Morbid obesity (BMI > 40).    This patient is being managed with:   Diet DASH/TLC-  Sodium & Cholesterol Restricted  Consistent Carbohydrate {No Snacks} (CSTCHO)  Low Fat (LOWFAT)  1500mL Fluid Restriction ([id number])  No Concentrated Potassium  Entered: Jan 17 2023 11:17AM    
This patient has been assessed with a concern for Malnutrition and has been determined to have a diagnosis/diagnoses of Morbid obesity (BMI > 40).    This patient is being managed with:   Diet DASH/TLC-  Sodium & Cholesterol Restricted  Consistent Carbohydrate {No Snacks} (CSTCHO)  Low Fat (LOWFAT)  1500mL Fluid Restriction ([id number])  No Concentrated Potassium  Entered: Jan 17 2023 11:17AM    
This patient has been assessed with a concern for Malnutrition and has been determined to have a diagnosis/diagnoses of Morbid obesity (BMI > 40).    This patient is being managed with:   Diet DASH/TLC-  Sodium & Cholesterol Restricted  Consistent Carbohydrate {No Snacks} (CSTCHO)  Low Fat (LOWFAT)  1500mL Fluid Restriction ([id number])  No Concentrated Potassium  Entered: Jan 17 2023 11:17AM    
This patient has been assessed with a concern for Malnutrition and has been determined to have a diagnosis/diagnoses of Morbid obesity (BMI > 40).    This patient is being managed with:   Diet DASH/TLC-  Sodium & Cholesterol Restricted  Consistent Carbohydrate {No Snacks} (CSTCHO)  Low Fat (LOWFAT)  1500mL Fluid Restriction ([id number])  No Concentrated Potassium  Entered: Jan 17 2023 11:17AM      This patient has been assessed with a concern for Malnutrition and has been determined to have a diagnosis/diagnoses of Morbid obesity (BMI > 40).    This patient is being managed with:   Diet DASH/TLC-  Sodium & Cholesterol Restricted  Consistent Carbohydrate {No Snacks} (CSTCHO)  Low Fat (LOWFAT)  1500mL Fluid Restriction ([id number])  No Concentrated Potassium  Entered: Jan 17 2023 11:17AM      This patient has been assessed with a concern for Malnutrition and has been determined to have a diagnosis/diagnoses of Morbid obesity (BMI > 40).    This patient is being managed with:   Diet DASH/TLC-  Sodium & Cholesterol Restricted  Consistent Carbohydrate {No Snacks} (CSTCHO)  Low Fat (LOWFAT)  1500mL Fluid Restriction ([id number])  No Concentrated Potassium  Entered: Jan 17 2023 11:17AM

## 2023-01-23 NOTE — PROGRESS NOTE ADULT - PROBLEM SELECTOR PLAN 4
h/o Afib and Aflutter s/p ablation and recent DCCV.   c/w Amiodarone.   c/w warfarin for AC.

## 2023-01-23 NOTE — PROGRESS NOTE ADULT - TIME BILLING
- Review of records, telemetry, vital signs and daily labs.   - General and cardiovascular physical examination.  - Generation of cardiovascular treatment plan.  - Coordination of care.      Patient was seen and examined by me on 1/19/23,interim events noted,labs and radiology studies reviewed.  Edwar Merida MD,FACC.  [address] [place], [place] [id number].  [phone]
- Review of records, telemetry, vital signs and daily labs.   - General and cardiovascular physical examination.  - Generation of cardiovascular treatment plan.  - Coordination of care.      Patient was seen and examined by me on 1/22/23,interim events noted,labs and radiology studies reviewed.  Edwar Merida MD,FACC.  [address] [place], [place] [id number].  [phone]
- Review of records, telemetry, vital signs and daily labs.   - General and cardiovascular physical examination.  - Generation of cardiovascular treatment plan.  - Coordination of care.      Patient was seen and examined by me on 1/21/23,interim events noted,labs and radiology studies reviewed.  Edwar Merida MD,FACC.  [address] [place], [place] [id number].  [phone]
- Review of records, telemetry, vital signs and daily labs.   - General and cardiovascular physical examination.  - Generation of cardiovascular treatment plan.  - Coordination of care.      Patient was seen and examined by me on 1/18/23,interim events noted,labs and radiology studies reviewed.  Edwar Merida MD,FACC.  [address] [place], [place] [id number].  [phone]
- Review of records, telemetry, vital signs and daily labs.   - General and cardiovascular physical examination.  - Generation of cardiovascular treatment plan.  - Coordination of care.      Patient was seen and examined by me on 1/17/23,interim events noted,labs and radiology studies reviewed.  Edwar Merida MD,FACC.  [address] [place], [place] [id number].  [phone]
- Review of records, telemetry, vital signs and daily labs.   - General and cardiovascular physical examination.  - Generation of cardiovascular treatment plan.  - Coordination of care.      Patient was seen and examined by me on 1/20/23,interim events noted,labs and radiology studies reviewed.  Edwar Merida MD,FACC.  [address] [place], [place] [id number].  [phone]
- Review of records, telemetry, vital signs and daily labs.   - General and cardiovascular physical examination.  - Generation of cardiovascular treatment plan.  - Coordination of care.      Patient was seen and examined by me on 1/23/23,interim events noted,labs and radiology studies reviewed.  Edwar Merida MD,FACC.  [address] [place], [place] [id number].  [phone]

## 2023-01-23 NOTE — PROGRESS NOTE ADULT - PROBLEM SELECTOR PROBLEM 2
Diastolic CHF, acute on chronic

## 2023-01-23 NOTE — PROGRESS NOTE ADULT - SUBJECTIVE AND OBJECTIVE BOX
[facility] NEPHROLOGY- PROGRESS NOTE    72 year old Female with history of COPD, CHF presents with weakness. Nephrology consulted for elevated Scr.    REVIEW OF SYSTEMS:  Gen: no fevers  Cards: no chest pain  Resp: + dyspnea resolved  GI: no nausea or vomiting or diarrhea  Vascular: + LE edema improving    No Known Allergies      Hospital Medications: Medications reviewed      VITALS:  T(F): 97.7 (23 @ 05:16), Max: 98.4 (23 @ 21:53)  HR: 72 (23 @ 10:00)  BP: 123/65 (23 @ 05:16)  RR: 19 (23 @ 05:16)  SpO2: 97% (23 @ 10:00)  Wt(kg): --     @ 07:  -   @ 07:00  --------------------------------------------------------  IN: 0 mL / OUT: 550 mL / NET: -550 mL        PHYSICAL EXAM:    Gen: NAD, calm  Cards: RRR, +S1/S2, no M/G/R  Resp: CTA B/L  GI: soft, NT/ND, NABS  Vascular:  trace LE edema, R hand edema at site of prior IV      LABS:      133<L>  |  89<L>  |  140<H>  ----------------------------<  263<H>  4.9   |  32<H>  |  2.55<H>    Ca    9.4      2023 01:20      Creatinine Trend: 2.55 <--, 2.48 <--, 2.37 <--, 2.19 <--, 1.92 <--, 2.18 <--                        10.5   11.14 )-----------( 210      ( 2023 01:20 )             33.7     Urine Studies:  Urinalysis Basic - ( 2023 17:54 )    Color: Colorless / Appearance: Clear / S.011 / pH:   Gluc:  / Ketone: Negative  / Bili: Negative / Urobili: <2 mg/dL   Blood:  / Protein: Negative / Nitrite: Negative   Leuk Esterase: Negative / RBC: 1 /HPF / WBC 1 /HPF   Sq Epi:  / Non Sq Epi:  / Bacteria: Negative      Creatinine, Random Urine: 17 mg/dL ( @ 17:54)

## 2023-01-23 NOTE — PROGRESS NOTE ADULT - PROBLEM SELECTOR PLAN 2
elevated proBNP, b/l LE edema, +orthopnea.   patient is at baseline O2 req, but symptomatic clinically.   on Bumex 2mg PO bid at home  iv diuresis as per cards
elevated proBNP, b/l LE edema, +orthopnea.   patient is at baseline O2 req, but symptomatic clinically.   on Bumex 2mg PO bid at home  iv diuresis as per cards
elevated proBNP, b/l LE edema, +orthopnea.   patient is at baseline O2 req, but symptomatic clinically.   on Bumex 2mg PO bid. will start Bumex 4mg IV BID  strict I&O, daily weight. admit to tele  recent echo was poor study due to body habitus.   monitor clinical response to diuretics.  c/w isosorbide and statin.
elevated proBNP, b/l LE edema, +orthopnea.   patient is at baseline O2 req, but symptomatic clinically.   on Bumex 2mg PO bid at home  iv diuresis as per cards
elevated proBNP, b/l LE edema, +orthopnea.   patient is at baseline O2 req, but symptomatic clinically.   on Bumex 2mg PO bid. will start Bumex 4mg IV BID  strict I&O, daily weight. admit to tele  recent echo was poor study due to body habitus.   monitor clinical response to diuretics.  c/w isosorbide and statin.
elevated proBNP, b/l LE edema, +orthopnea.   patient is at baseline O2 req, but symptomatic clinically.   on Bumex 2mg PO bid at home  iv diuresis as per cards
elevated proBNP, b/l LE edema, +orthopnea.   patient is at baseline O2 req, but symptomatic clinically.   diuresis as per cards

## 2023-01-23 NOTE — PROGRESS NOTE ADULT - PROBLEM SELECTOR PLAN 5
on home Levemir and novolog  start CC diet.    on lantus 20U qhs, Adamelog 8U tid.
on home Levemir and novolog  start CC diet.    on lantus 20U qhs, Adamelog 8U tid.  titrate insulin for optimal blood sugar control
on home Levemir and novolog  start CC diet.    on lantus 20U qhs, Adamelog 8U tid.
on home Levemir and novolog  start CC diet.    on lantus 20U qhs, Adamelog 8U tid.  titrate insulin for optimal blood sugar control
on home Levemir and novolog  start CC diet.    on lantus 20U qhs, Adamelog 8U tid.  titrate insulin for optimal blood sugar control

## 2023-01-23 NOTE — PROGRESS NOTE ADULT - PROBLEM SELECTOR PLAN 1
s/p solumedrol, duoneb in er  no wheezing on lung exam.     c/w solumedrol. duoneb rtc.   c/w home symbicort
s/p solumedrol, duoneb in er  no wheezing on lung exam.   currently mentating abel.   c/w solumedrol. duoneb rtc.   c/w home symbicort  provide O2 vis humidified air. might need this at home to prevent nose bleed at home.
s/p solumedrol, duoneb in er  no wheezing on lung exam.     c/w solumedrol. duoneb rtc.   c/w home symbicort
s/p solumedrol, duoneb in er  no wheezing on lung exam.   currently mentating abel.   c/w solumedrol. duoneb rtc.   c/w home symbicort  provide O2 vis humidified air. might need this at home to prevent nose bleed at home.

## 2023-01-23 NOTE — PROVIDER CONTACT NOTE (OTHER) - SITUATION
Patient experience nosebleed. Patient on continuos heparin drip.
BP: 118/50, pt. asymptomatic.
BP: 135/46, pt. asymptomatic.
at 10:15 Patient had a run of A-flutter for 8 seconds, 
pt c/o chest pressure/tightness 9/10

## 2023-01-24 VITALS
DIASTOLIC BLOOD PRESSURE: 76 MMHG | SYSTOLIC BLOOD PRESSURE: 151 MMHG | OXYGEN SATURATION: 99 % | RESPIRATION RATE: 18 BRPM | TEMPERATURE: 99 F | HEART RATE: 74 BPM

## 2023-01-24 LAB
ANION GAP SERPL CALC-SCNC: 17 MMOL/L — HIGH (ref 7–14)
BASOPHILS # BLD AUTO: 0.01 K/UL — SIGNIFICANT CHANGE UP (ref 0–0.2)
BASOPHILS NFR BLD AUTO: 0.1 % — SIGNIFICANT CHANGE UP (ref 0–2)
BUN SERPL-MCNC: 118 MG/DL — HIGH (ref 7–23)
CALCIUM SERPL-MCNC: 9.9 MG/DL — SIGNIFICANT CHANGE UP (ref 8.4–10.5)
CHLORIDE SERPL-SCNC: 95 MMOL/L — LOW (ref 98–107)
CO2 SERPL-SCNC: 30 MMOL/L — SIGNIFICANT CHANGE UP (ref 22–31)
CREAT SERPL-MCNC: 2.27 MG/DL — HIGH (ref 0.5–1.3)
EGFR: 22 ML/MIN/1.73M2 — LOW
EOSINOPHIL # BLD AUTO: 0.04 K/UL — SIGNIFICANT CHANGE UP (ref 0–0.5)
EOSINOPHIL NFR BLD AUTO: 0.5 % — SIGNIFICANT CHANGE UP (ref 0–6)
GLUCOSE BLDC GLUCOMTR-MCNC: 149 MG/DL — HIGH (ref 70–99)
GLUCOSE BLDC GLUCOMTR-MCNC: 180 MG/DL — HIGH (ref 70–99)
GLUCOSE BLDC GLUCOMTR-MCNC: 229 MG/DL — HIGH (ref 70–99)
GLUCOSE SERPL-MCNC: 127 MG/DL — HIGH (ref 70–99)
HCT VFR BLD CALC: 34.7 % — SIGNIFICANT CHANGE UP (ref 34.5–45)
HGB BLD-MCNC: 10.4 G/DL — LOW (ref 11.5–15.5)
IANC: 6.36 K/UL — SIGNIFICANT CHANGE UP (ref 1.8–7.4)
IMM GRANULOCYTES NFR BLD AUTO: 1.8 % — HIGH (ref 0–0.9)
INR BLD: 3.51 RATIO — HIGH (ref 0.88–1.16)
LYMPHOCYTES # BLD AUTO: 0.72 K/UL — LOW (ref 1–3.3)
LYMPHOCYTES # BLD AUTO: 9 % — LOW (ref 13–44)
MAGNESIUM SERPL-MCNC: 3.1 MG/DL — HIGH (ref 1.6–2.6)
MCHC RBC-ENTMCNC: 30 GM/DL — LOW (ref 32–36)
MCHC RBC-ENTMCNC: 30.2 PG — SIGNIFICANT CHANGE UP (ref 27–34)
MCV RBC AUTO: 100.9 FL — HIGH (ref 80–100)
MONOCYTES # BLD AUTO: 0.72 K/UL — SIGNIFICANT CHANGE UP (ref 0–0.9)
MONOCYTES NFR BLD AUTO: 9 % — SIGNIFICANT CHANGE UP (ref 2–14)
NEUTROPHILS # BLD AUTO: 6.36 K/UL — SIGNIFICANT CHANGE UP (ref 1.8–7.4)
NEUTROPHILS NFR BLD AUTO: 79.6 % — HIGH (ref 43–77)
NRBC # BLD: 0 /100 WBCS — SIGNIFICANT CHANGE UP (ref 0–0)
NRBC # FLD: 0 K/UL — SIGNIFICANT CHANGE UP (ref 0–0)
PHOSPHATE SERPL-MCNC: 3.9 MG/DL — SIGNIFICANT CHANGE UP (ref 2.5–4.5)
PLATELET # BLD AUTO: 212 K/UL — SIGNIFICANT CHANGE UP (ref 150–400)
POTASSIUM SERPL-MCNC: 4.5 MMOL/L — SIGNIFICANT CHANGE UP (ref 3.5–5.3)
POTASSIUM SERPL-SCNC: 4.5 MMOL/L — SIGNIFICANT CHANGE UP (ref 3.5–5.3)
PROTHROM AB SERPL-ACNC: 41.2 SEC — HIGH (ref 10.5–13.4)
RBC # BLD: 3.44 M/UL — LOW (ref 3.8–5.2)
RBC # FLD: 15.1 % — HIGH (ref 10.3–14.5)
SODIUM SERPL-SCNC: 142 MMOL/L — SIGNIFICANT CHANGE UP (ref 135–145)
WBC # BLD: 7.99 K/UL — SIGNIFICANT CHANGE UP (ref 3.8–10.5)
WBC # FLD AUTO: 7.99 K/UL — SIGNIFICANT CHANGE UP (ref 3.8–10.5)

## 2023-01-24 PROCEDURE — 99232 SBSQ HOSP IP/OBS MODERATE 35: CPT

## 2023-01-24 RX ORDER — WARFARIN SODIUM 2.5 MG/1
5 TABLET ORAL ONCE
Refills: 0 | Status: COMPLETED | OUTPATIENT
Start: 2023-01-24 | End: 2023-01-24

## 2023-01-24 RX ORDER — HYDRALAZINE HCL 50 MG
1 TABLET ORAL
Qty: 90 | Refills: 0
Start: 2023-01-24 | End: 2023-02-22

## 2023-01-24 RX ORDER — WARFARIN SODIUM 2.5 MG/1
5 TABLET ORAL ONCE
Refills: 0 | Status: DISCONTINUED | OUTPATIENT
Start: 2023-01-24 | End: 2023-01-24

## 2023-01-24 RX ORDER — WARFARIN SODIUM 2.5 MG/1
1 TABLET ORAL
Qty: 30 | Refills: 0
Start: 2023-01-24 | End: 2023-02-22

## 2023-01-24 RX ORDER — WARFARIN SODIUM 2.5 MG/1
1 TABLET ORAL
Qty: 0 | Refills: 0 | DISCHARGE

## 2023-01-24 RX ORDER — BUMETANIDE 0.25 MG/ML
1 INJECTION INTRAMUSCULAR; INTRAVENOUS
Qty: 30 | Refills: 0
Start: 2023-01-24 | End: 2023-02-22

## 2023-01-24 RX ADMIN — Medication 10 UNIT(S): at 09:02

## 2023-01-24 RX ADMIN — Medication 50 MILLIGRAM(S): at 13:29

## 2023-01-24 RX ADMIN — Medication 4: at 13:10

## 2023-01-24 RX ADMIN — ISOSORBIDE MONONITRATE 30 MILLIGRAM(S): 60 TABLET, EXTENDED RELEASE ORAL at 13:29

## 2023-01-24 RX ADMIN — AMIODARONE HYDROCHLORIDE 200 MILLIGRAM(S): 400 TABLET ORAL at 05:25

## 2023-01-24 RX ADMIN — LIDOCAINE 1 PATCH: 4 CREAM TOPICAL at 01:16

## 2023-01-24 RX ADMIN — Medication 10 UNIT(S): at 13:10

## 2023-01-24 RX ADMIN — Medication 325 MILLIGRAM(S): at 13:30

## 2023-01-24 RX ADMIN — Medication 40 MILLIGRAM(S): at 05:25

## 2023-01-24 RX ADMIN — BUDESONIDE AND FORMOTEROL FUMARATE DIHYDRATE 2 PUFF(S): 160; 4.5 AEROSOL RESPIRATORY (INHALATION) at 09:33

## 2023-01-24 RX ADMIN — Medication 3 MILLILITER(S): at 10:33

## 2023-01-24 RX ADMIN — Medication 2: at 09:01

## 2023-01-24 RX ADMIN — WARFARIN SODIUM 5 MILLIGRAM(S): 2.5 TABLET ORAL at 16:51

## 2023-01-24 RX ADMIN — Medication 3 MILLILITER(S): at 03:55

## 2023-01-24 RX ADMIN — PANTOPRAZOLE SODIUM 40 MILLIGRAM(S): 20 TABLET, DELAYED RELEASE ORAL at 06:11

## 2023-01-24 RX ADMIN — Medication 50 MILLIGRAM(S): at 05:25

## 2023-01-24 NOTE — PROGRESS NOTE ADULT - SUBJECTIVE AND OBJECTIVE BOX
PULMONARY PROGRESS NOTE    DAMARI GUERRERO  MRN-[id number]    Patient is a 72y old  Female who presents with a chief complaint of SOB (15 Ruddy 2023 12:27)      HPI:  will be going home today  feeling better      MEDICATIONS  (STANDING):  acetaminophen   IVPB .. 1000 milliGRAM(s) IV Intermittent once  albuterol/ipratropium for Nebulization 3 milliLiter(s) Nebulizer every 6 hours  aMIOdarone    Tablet 200 milliGRAM(s) Oral daily  budesonide 160 MICROgram(s)/formoterol 4.5 MICROgram(s) Inhaler 2 Puff(s) Inhalation two times a day  dextrose 5%. 1000 milliLiter(s) (50 mL/Hr) IV Continuous <Continuous>  dextrose 5%. 1000 milliLiter(s) (100 mL/Hr) IV Continuous <Continuous>  dextrose 50% Injectable 25 Gram(s) IV Push once  dextrose 50% Injectable 12.5 Gram(s) IV Push once  dextrose 50% Injectable 25 Gram(s) IV Push once  ferrous    sulfate 325 milliGRAM(s) Oral daily  glucagon  Injectable 1 milliGRAM(s) IntraMuscular once  hydrALAZINE 50 milliGRAM(s) Oral three times a day  insulin glargine Injectable (LANTUS) 25 Unit(s) SubCutaneous at bedtime  insulin lispro (ADMELOG) corrective regimen sliding scale   SubCutaneous three times a day before meals  insulin lispro (ADMELOG) corrective regimen sliding scale   SubCutaneous at bedtime  insulin lispro Injectable (ADMELOG) 10 Unit(s) SubCutaneous three times a day before meals  isosorbide   mononitrate ER Tablet (IMDUR) 30 milliGRAM(s) Oral daily  lidocaine   4% Patch 1 Patch Transdermal daily  lidocaine   4% Patch 1 Patch Transdermal daily  pantoprazole    Tablet 40 milliGRAM(s) Oral before breakfast  predniSONE   Tablet 40 milliGRAM(s) Oral daily  simvastatin 10 milliGRAM(s) Oral at bedtime    MEDICATIONS  (PRN):  acetaminophen     Tablet .. 650 milliGRAM(s) Oral every 6 hours PRN Temp greater or equal to 38C (100.4F), Mild Pain (1 - 3), Moderate Pain (4 - 6)  aluminum hydroxide/magnesium hydroxide/simethicone Suspension 30 milliLiter(s) Oral every 4 hours PRN Dyspepsia  dextrose Oral Gel 15 Gram(s) Oral once PRN Blood Glucose LESS THAN 70 milliGRAM(s)/deciliter  sodium chloride 0.65% Nasal 1 Spray(s) Both Nostrils three times a day PRN Nasal Congestion        EXAM:  Vital Signs Last 24 Hrs  T(C): 37.1 (24 Jan 2023 13:00), Max: 37.1 (24 Jan 2023 13:00)  T(F): 98.8 (24 Jan 2023 13:00), Max: 98.8 (24 Jan 2023 13:00)  HR: 74 (24 Jan 2023 13:00) (65 - 75)  BP: 151/76 (24 Jan 2023 13:00) (118/50 - 151/76)  BP(mean): --  RR: 18 (24 Jan 2023 13:00) (18 - 18)  SpO2: 99% (24 Jan 2023 13:00) (95% - 99%)    Parameters below as of 24 Jan 2023 13:00  Patient On (Oxygen Delivery Method): nasal cannula  O2 Flow (L/min): 2    GENERAL: The patient is awake and alert in no apparent distress.     LUNGS:  not labored. clear lungs                                    10.4   7.99  )-----------( 212      ( 24 Jan 2023 07:21 )             34.7   01-24    142  |  95<L>  |  118<H>  ----------------------------<  127<H>  4.5   |  30  |  2.27<H>    Ca    9.9      24 Jan 2023 07:21  Phos  3.9     01-24  Mg     3.10     01-24           < from: Xray Chest 1 View- PORTABLE-Urgent (Xray Chest 1 View- PORTABLE-Urgent .) (01.12.23 @ 23:31) >    ACC: [id number] EXAM:  XR CHEST PORTABLE URGENT 1V                          PROCEDURE DATE:  01/12/2023          INTERPRETATION:  CLINICAL INDICATION: Shortness of breath. History of CHF   and COPD.    EXAM: Frontal radiograph of the chest.    COMPARISON: Chest radiograph from 10/23/22    FINDINGS:  Median sternotomy and valve replacement.  Indistinctiveness of pulmonary vasculature without focal consolidation.  There is no pleural effusion or pneumothorax.  Cardiomegaly.  The visualized osseous structures demonstrate no acute pathology.    IMPRESSION:  Cardiomegaly with pulmonary edema.    --- End of Report ---          AC FLORES MD; Resident Radiology  This document has been electronically signed.  DELIA PEDERSEN MD; Attending Radiologist  This document has been electronically signed. Jan 13 2023  6:53AM    < end of copied text >  < from: CT Abdomen and Pelvis No Cont (10.22.22 @ 22:38) >    ACC: [id number] EXAM:  CT ABDOMEN AND PELVIS                          PROCEDURE DATE:  10/22/2022          INTERPRETATION:  CLINICAL INFORMATION: Vomiting    COMPARISON: CT abdomen pelvis 4/12/2021    CONTRAST/COMPLICATIONS:  IV Contrast:  Oral Contrast:  Complications:    PROCEDURE:  CT of the Abdomen and Pelvis was performed.  Sagittal and coronal reformats were performed.    FINDINGS: Images are degraded by respiratory motion artifact.    LOWER CHEST: Cardiomegaly. Replaced mitral valve. Median sternotomy.   Bilateral lower lobe septal thickening and groundglass opacities likely   mild edema.    LIVER: Within normal limits.  BILE DUCTS: Normal caliber.  GALLBLADDER: Cholelithiasis.  SPLEEN: Within normal limits.  PANCREAS: Within normal limits.  ADRENALS: Within normal limits.  KIDNEYS/URETERS: Within normal limits.    BLADDER: Minimally distended.  REPRODUCTIVE ORGANS: Uterus and adnexa within normal limits.    BOWEL: Sigmoid diverticulosis. No bowel obstruction. Appendix is normal.  PERITONEUM: No ascites.  VESSELS: Atherosclerotic changes.  RETROPERITONEUM/LYMPH NODES: No lymphadenopathy.  ABDOMINAL WALL: Within normal limits.  BONES: Degenerative changes of the spine with grade 1 anterolisthesis of   L4 on L5 and L5 on S1.    IMPRESSION: Motion degraded exam.  No acute abdominal pathology.    Mild bibasilar pulmonary edema.    --- End of Report ---            GINGER HESS MD; Attending Radiologist  This document has been electronically signed. Oct 22 2022 11:39PM    < end of copied text >      PROBLEM LIST:  72y Female with HEALTH ISSUES - PROBLEM Dx:  COPD exacerbation    Diastolic CHF, acute on chronic    H/O mitral valve replacement with mechanical valve    MIGUEL treated with BiPAP    Prophylactic measure    Type 2 diabetes mellitus    Afib              RECS:  cont full ac   continue 02  bipap during sleep  bronchodilators  prednisone was not tapered as ordered, can dc now and resume home dose.  fu in our office as outpt        Please call with any questions.  Em Merida MD  [facility] Pulmonary/Sleep Medicine  [phone]

## 2023-01-24 NOTE — PROGRESS NOTE ADULT - ASSESSMENT
72 year old Female with history of COPD, CHF presents with weakness. Nephrology consulted for elevated Scr.    1) MISAEL: likely hemodynamically mediated in setting of diuresis. Scr improving off of diuretics. Azotemia in part due to steroids. UA bland. FeNa low. Bladder scan negative. Avoid nephrotoxins.    2) CKD-3b: Baseline Scr 1.6-1.8 thought to be secondary to DM. Outpatient CKD work up. Monitor electrolytes.    3) HTN with CKD: BP controlled. Continue with current medications. Monitor BP.    4) LE edema: Improved with Bumex discontinued due to MISAEL and contraction alkalosis. Will need to resume at lower dose (2 mg PO daily) on discharge. CT chest negative for pulmonary edema. Prior TTE with mild to moderate global LVSD. Monitor UO.    5) Hypercalcemia: likely primary HPT exacerbated in setting of diuresis. Will add sensipar if hypercalcemia recurs. Monitor serum calcium.    6) L renal mass: Patient aware of L renal mass. Would ensure patient has Urology follow up.      [facility] NEPHROLOGY  Rodrigue Amador M.D.  Rob Perez D.O.  Ana Song M.D.  Lucrecia López, ALLISON, ANP-C    Telephone: [phone]  Facsimile: [phone]    [address] [place] [address]

## 2023-01-24 NOTE — DIETITIAN INITIAL EVALUATION ADULT - PROBLEM/PLAN-5
Left a detailed voicemail for patient stating that Dr. Rios is out sick and he is booked out. We will add him to the waiting list for cancellations.     DISPLAY PLAN FREE TEXT

## 2023-01-24 NOTE — PROGRESS NOTE ADULT - SUBJECTIVE AND OBJECTIVE BOX
[facility] NEPHROLOGY- PROGRESS NOTE    72 year old Female with history of COPD, CHF presents with weakness. Nephrology consulted for elevated Scr.    REVIEW OF SYSTEMS:  Gen: no fevers  Cards: no chest pain  Resp: + dyspnea resolved  GI: no nausea or vomiting or diarrhea  Vascular: + LE edema improving    No Known Allergies      Hospital Medications: Medications reviewed      VITALS:  T(F): 97.4 (23 @ 05:00), Max: 98.7 (23 @ 13:17)  HR: 65 (23 @ 05:00)  BP: 120/58 (23 @ 05:00)  RR: 18 (23 @ 05:00)  SpO2: 98% (23 @ 05:00)  Wt(kg): --     @ 07: @ 07:00  --------------------------------------------------------  IN: 640 mL / OUT: 250 mL / NET: 390 mL     @ 07: @ 11:34  --------------------------------------------------------  IN: 420 mL / OUT: 550 mL / NET: -130 mL      PHYSICAL EXAM:    Gen: NAD, calm  Cards: RRR, +S1/S2, no M/G/R  Resp: CTA B/L  GI: soft, NT/ND, NABS  Vascular:  trace LE edema      LABS:      142  |  95<L>  |  118<H>  ----------------------------<  127<H>  4.5   |  30  |  2.27<H>    Ca    9.9      2023 07:21  Phos  3.9       Mg     3.10           Creatinine Trend: 2.27 <--, 2.32 <--, 2.55 <--, 2.48 <--, 2.37 <--, 2.19 <--, 1.92 <--                        10.4   7.99  )-----------( 212      ( 2023 07:21 )             34.7     Urine Studies:  Urinalysis Basic - ( 2023 17:54 )    Color: Colorless / Appearance: Clear / S.011 / pH:   Gluc:  / Ketone: Negative  / Bili: Negative / Urobili: <2 mg/dL   Blood:  / Protein: Negative / Nitrite: Negative   Leuk Esterase: Negative / RBC: 1 /HPF / WBC 1 /HPF   Sq Epi:  / Non Sq Epi:  / Bacteria: Negative      Creatinine, Random Urine: 17 mg/dL ( @ 17:54)

## 2023-01-24 NOTE — PROGRESS NOTE ADULT - PROVIDER SPECIALTY LIST ADULT
Nephrology
Cardiology
Internal Medicine
Nephrology
Pulmonology
Pulmonology
Cardiology
Internal Medicine
Nephrology
Pulmonology
Pulmonology
Cardiology
Nephrology
Cardiology
Internal Medicine

## 2023-02-02 ENCOUNTER — NON-APPOINTMENT (OUTPATIENT)
Age: 73
End: 2023-02-02

## 2023-02-04 ENCOUNTER — INPATIENT (INPATIENT)
Facility: HOSPITAL | Age: 73
LOS: 29 days | Discharge: HOSPICE | End: 2023-03-06
Attending: INTERNAL MEDICINE | Admitting: INTERNAL MEDICINE
Payer: MEDICARE

## 2023-02-04 VITALS
OXYGEN SATURATION: 100 % | RESPIRATION RATE: 22 BRPM | DIASTOLIC BLOOD PRESSURE: 69 MMHG | SYSTOLIC BLOOD PRESSURE: 168 MMHG | TEMPERATURE: 98 F | HEART RATE: 71 BPM

## 2023-02-04 DIAGNOSIS — R04.0 EPISTAXIS: ICD-10-CM

## 2023-02-04 DIAGNOSIS — Z95.4 PRESENCE OF OTHER HEART-VALVE REPLACEMENT: Chronic | ICD-10-CM

## 2023-02-04 LAB
ALBUMIN SERPL ELPH-MCNC: 3.8 G/DL — SIGNIFICANT CHANGE UP (ref 3.3–5)
ALP SERPL-CCNC: 98 U/L — SIGNIFICANT CHANGE UP (ref 40–120)
ALT FLD-CCNC: 23 U/L — SIGNIFICANT CHANGE UP (ref 4–33)
ANION GAP SERPL CALC-SCNC: 9 MMOL/L — SIGNIFICANT CHANGE UP (ref 7–14)
APTT BLD: 33.3 SEC — SIGNIFICANT CHANGE UP (ref 27–36.3)
AST SERPL-CCNC: 19 U/L — SIGNIFICANT CHANGE UP (ref 4–32)
BASOPHILS # BLD AUTO: 0.01 K/UL — SIGNIFICANT CHANGE UP (ref 0–0.2)
BASOPHILS NFR BLD AUTO: 0.2 % — SIGNIFICANT CHANGE UP (ref 0–2)
BILIRUB SERPL-MCNC: 0.3 MG/DL — SIGNIFICANT CHANGE UP (ref 0.2–1.2)
BUN SERPL-MCNC: 81 MG/DL — HIGH (ref 7–23)
CALCIUM SERPL-MCNC: 9.7 MG/DL — SIGNIFICANT CHANGE UP (ref 8.4–10.5)
CHLORIDE SERPL-SCNC: 100 MMOL/L — SIGNIFICANT CHANGE UP (ref 98–107)
CO2 SERPL-SCNC: 34 MMOL/L — HIGH (ref 22–31)
CREAT SERPL-MCNC: 2.08 MG/DL — HIGH (ref 0.5–1.3)
EGFR: 25 ML/MIN/1.73M2 — LOW
EOSINOPHIL # BLD AUTO: 0.02 K/UL — SIGNIFICANT CHANGE UP (ref 0–0.5)
EOSINOPHIL NFR BLD AUTO: 0.3 % — SIGNIFICANT CHANGE UP (ref 0–6)
FLUAV AG NPH QL: SIGNIFICANT CHANGE UP
FLUBV AG NPH QL: SIGNIFICANT CHANGE UP
GLUCOSE SERPL-MCNC: 148 MG/DL — HIGH (ref 70–99)
HCT VFR BLD CALC: 30.2 % — LOW (ref 34.5–45)
HCT VFR BLD CALC: 31.1 % — LOW (ref 34.5–45)
HGB BLD-MCNC: 8.6 G/DL — LOW (ref 11.5–15.5)
HGB BLD-MCNC: 8.8 G/DL — LOW (ref 11.5–15.5)
IANC: 4.93 K/UL — SIGNIFICANT CHANGE UP (ref 1.8–7.4)
IMM GRANULOCYTES NFR BLD AUTO: 0.8 % — SIGNIFICANT CHANGE UP (ref 0–0.9)
INR BLD: 1.04 RATIO — SIGNIFICANT CHANGE UP (ref 0.88–1.16)
LYMPHOCYTES # BLD AUTO: 0.57 K/UL — LOW (ref 1–3.3)
LYMPHOCYTES # BLD AUTO: 9.4 % — LOW (ref 13–44)
MCHC RBC-ENTMCNC: 28.3 GM/DL — LOW (ref 32–36)
MCHC RBC-ENTMCNC: 28.5 GM/DL — LOW (ref 32–36)
MCHC RBC-ENTMCNC: 29.7 PG — SIGNIFICANT CHANGE UP (ref 27–34)
MCHC RBC-ENTMCNC: 30.2 PG — SIGNIFICANT CHANGE UP (ref 27–34)
MCV RBC AUTO: 105.1 FL — HIGH (ref 80–100)
MCV RBC AUTO: 106 FL — HIGH (ref 80–100)
MONOCYTES # BLD AUTO: 0.48 K/UL — SIGNIFICANT CHANGE UP (ref 0–0.9)
MONOCYTES NFR BLD AUTO: 7.9 % — SIGNIFICANT CHANGE UP (ref 2–14)
NEUTROPHILS # BLD AUTO: 4.93 K/UL — SIGNIFICANT CHANGE UP (ref 1.8–7.4)
NEUTROPHILS NFR BLD AUTO: 81.4 % — HIGH (ref 43–77)
NRBC # BLD: 0 /100 WBCS — SIGNIFICANT CHANGE UP (ref 0–0)
NRBC # BLD: 0 /100 WBCS — SIGNIFICANT CHANGE UP (ref 0–0)
NRBC # FLD: 0 K/UL — SIGNIFICANT CHANGE UP (ref 0–0)
NRBC # FLD: 0 K/UL — SIGNIFICANT CHANGE UP (ref 0–0)
PLATELET # BLD AUTO: 186 K/UL — SIGNIFICANT CHANGE UP (ref 150–400)
PLATELET # BLD AUTO: 191 K/UL — SIGNIFICANT CHANGE UP (ref 150–400)
POTASSIUM SERPL-MCNC: 5.6 MMOL/L — HIGH (ref 3.5–5.3)
POTASSIUM SERPL-SCNC: 5.6 MMOL/L — HIGH (ref 3.5–5.3)
PROT SERPL-MCNC: 7.1 G/DL — SIGNIFICANT CHANGE UP (ref 6–8.3)
PROTHROM AB SERPL-ACNC: 12.1 SEC — SIGNIFICANT CHANGE UP (ref 10.5–13.4)
RBC # BLD: 2.85 M/UL — LOW (ref 3.8–5.2)
RBC # BLD: 2.96 M/UL — LOW (ref 3.8–5.2)
RBC # FLD: 14.2 % — SIGNIFICANT CHANGE UP (ref 10.3–14.5)
RBC # FLD: 14.3 % — SIGNIFICANT CHANGE UP (ref 10.3–14.5)
RSV RNA NPH QL NAA+NON-PROBE: SIGNIFICANT CHANGE UP
SARS-COV-2 RNA SPEC QL NAA+PROBE: SIGNIFICANT CHANGE UP
SODIUM SERPL-SCNC: 143 MMOL/L — SIGNIFICANT CHANGE UP (ref 135–145)
WBC # BLD: 5.62 K/UL — SIGNIFICANT CHANGE UP (ref 3.8–10.5)
WBC # BLD: 6.06 K/UL — SIGNIFICANT CHANGE UP (ref 3.8–10.5)
WBC # FLD AUTO: 5.62 K/UL — SIGNIFICANT CHANGE UP (ref 3.8–10.5)
WBC # FLD AUTO: 6.06 K/UL — SIGNIFICANT CHANGE UP (ref 3.8–10.5)

## 2023-02-04 PROCEDURE — 99285 EMERGENCY DEPT VISIT HI MDM: CPT

## 2023-02-04 PROCEDURE — 71045 X-RAY EXAM CHEST 1 VIEW: CPT | Mod: 26

## 2023-02-04 PROCEDURE — 99223 1ST HOSP IP/OBS HIGH 75: CPT

## 2023-02-04 NOTE — ED ADULT NURSE NOTE - CHIEF COMPLAINT QUOTE
Pt c/o epistaxis that started X 45 mins PTA. Pt on 3L NC at baseline, on non-rebreather due to nose bleed. Per EMS, noted blood with clots on scene. + blood thinner use. Also endorsing dyspnea on exertion and some dizziness since bleeding started. Phx: COPD, HTN, CHF, Afib. .

## 2023-02-04 NOTE — ED ADULT NURSE NOTE - OBJECTIVE STATEMENT
Patient received to 5. Patient is a&ox4 ambulatory with assistance c/o of epistasis. Patient was bleeding out her nose and coming out the mouth. Stated this has been happening for years. Patient is on home 02 3L NC for "years" due to COPD. Patient denies chest pain sob n/.v Patient blleeding stopped when in the room.

## 2023-02-04 NOTE — H&P ADULT - NSHPREVIEWOFSYSTEMS_GEN_ALL_CORE
REVIEW OF SYSTEMS:    CONSTITUTIONAL: No weakness, fevers or chills  EYES/ENT: No visual changes;  No dysphagia  NECK: No pain or stiffness  RESPIRATORY: No cough, wheezing, hemoptysis; chronic shortness of breath  CARDIOVASCULAR: No chest pain or palpitations; No lower extremity edema  GASTROINTESTINAL: No abdominal or epigastric pain. No nausea, vomiting, or hematemesis; No diarrhea or constipation. No melena or hematochezia.  GENITOURINARY: No dysuria, frequency or hematuria  NEUROLOGICAL: No numbness or weakness  SKIN: No itching, burning, rashes, or lesions   PSYCH: No auditory or visual hallucinations  All other review of systems is negative unless indicated above.

## 2023-02-04 NOTE — ED PROVIDER NOTE - PROGRESS NOTE DETAILS
Daljit Hook, PGY-3- pt with hgb approx 1.5 points lower than when dc 10 days ago. Also INR is subtherapeutic. Concern for rebleeding with increasing INR and feel pt would be best admitted to hospital. No current bleeding. Will admit for further management. Dr. Newman accepts pt

## 2023-02-04 NOTE — ED ADULT TRIAGE NOTE - CHIEF COMPLAINT QUOTE
Pt c/o epistaxis that started X 45 mins PTA. Pt on 3L NC at baseline, on non-rebreather due to nose bleed. Per EMS, noted blood with clots on scene. Also endorsing dyspnea on exertion and some dizziness since bleeding started. Phx: COPD, HTN, CHF, Afib. . Pt c/o epistaxis that started X 45 mins PTA. Pt on 3L NC at baseline, on non-rebreather due to nose bleed. Per EMS, noted blood with clots on scene. + blood thinner use. Also endorsing dyspnea on exertion and some dizziness since bleeding started. Phx: COPD, HTN, CHF, Afib. .

## 2023-02-04 NOTE — ED PROVIDER NOTE - ATTENDING CONTRIBUTION TO CARE
Patient is a 73 yo F with history of HTN, CHF, COPD on home O2, history of mitral valve replacement, history of tricuspid valve replacement, pulmonary hypertension, obstructive sleep apnea, A-fib on Coumadin now here for evaluation after episode of epistaxis.  Patient reports she had about 25 minutes of epistaxis emergency prior to arrival Patient is a 73 yo F with history of HTN, CHF, COPD on home O2 3L via NC, history of mitral valve replacement, history of tricuspid valve replacement, pulmonary hypertension, obstructive sleep apnea, A-fib on Coumadin now here for evaluation after episode of epistaxis.  Patient reports she had about 25 minutes of epistaxis prior to arrival. Patient denies blowing her nose. She states she has been feeling dizzy chronically. She reports she has noted intermittent black stools but states she thinks this is due to her vitamins, iron use. Last bowel movement today was normal, not black or bloody. Denies a history of GI bleed. No fevers, chills. She has chronic shortness of breath. No chest pain, abdominal pain.     VS noted  Gen: tachypneic  HEENT: EOMI, mmm, pharynx without any blood noted, bilateral nares with dried blood visible, no active bleeding  Lungs: CTAB/L no C/ W /R   CVS: RRR   Abd; Soft non tender, non distended   Ext: mild bilateral lower extremity edema  Skin: no rash  Neuro AAOx3 non focal clear speech  a/p: epistaxis - patient reports she is on coumadin and had bleeding for at least 25 minutes. No active bleeding on exam, however patient states she feels dizzy which is worse than usual. She reports her shortness of breath is chronic and unchanged. Plan to check labs to check hemoglobin.   - Nabila GARCÍA

## 2023-02-04 NOTE — ED PROVIDER NOTE - CLINICAL SUMMARY MEDICAL DECISION MAKING FREE TEXT BOX
Daljit Hoko, PGY-3-   72-year-old female with past medical history of COPD, on 3 L nasal cannula baseline, mechanical valve replacement, DM, on Coumadin, presents to ED for evaluation of epistaxis that started prior to arrival and subsequently resolved.  Patient reports she had bilateral  That stopped on its own.  On arrival to ED patient without active bleeding, however, has dried blood in anterior nares.  No blood in posterior oropharynx.  Patient with chronic dizziness and shortness of breath, which she states is not worse than usual.  Feels improved since bleeding has stopped.  Triage note clarified and addressed with patient.  Plan to check labs chest x-ray, evaluate INR and for signs of anemia.  Will monitor patient in the ED and dispo pending work-up. Hx of dark stools in past, had normal BM earlier today. Has been followed by GI for GIB in the past, but pt denying current sxs.

## 2023-02-04 NOTE — H&P ADULT - PROBLEM SELECTOR PLAN 2
Currently subtherapeutic INR. High stroke risk. No recent changes in med. Has been on amiodarone for a long time. There is concern for adherence  -Will resume anticoagulation due to high stroke risk from mechanical valve. Informed patient of risks and benefits of using AC. Patient agreeable and expressed understanding  -Heparin drip and resuming home warfarin with extra 5mg tonight

## 2023-02-04 NOTE — ED PROVIDER NOTE - DIFFERENTIAL DIAGNOSIS
DDx includes but is not limited to-  epistaxis, copd exacerbation, anemia, dehydration Differential Diagnosis

## 2023-02-04 NOTE — H&P ADULT - NSHPPHYSICALEXAM_GEN_ALL_CORE
PHYSICAL EXAM:  GENERAL: NAD, comfortable appearing  HEAD:  Atraumatic, Normocephalic  EYES: EOMI, PERRL, conjunctiva and sclera clear  NECK: Supple, No JVD  CHEST/LUNG: Clear to auscultation bilaterally; No wheezes, rales or rhonchi, on NC 3L  HEART: Regular rate and rhythm; No murmurs, rubs, or gallops, (+)S1, S2  ABDOMEN: Soft, Nontender, Nondistended; Normal Bowel sounds   EXTREMITIES:  2+ Peripheral Pulses, No clubbing, cyanosis, or edema  PSYCH: normal mood and affect  NEUROLOGY: AAOx3, non-focal  SKIN: No rashes or lesions

## 2023-02-04 NOTE — ED ADULT NURSE NOTE - NSIMPLEMENTINTERV_GEN_ALL_ED
Implemented All Universal Safety Interventions:  Sixes to call system. Call bell, personal items and telephone within reach. Instruct patient to call for assistance. Room bathroom lighting operational. Non-slip footwear when patient is off stretcher. Physically safe environment: no spills, clutter or unnecessary equipment. Stretcher in lowest position, wheels locked, appropriate side rails in place.

## 2023-02-04 NOTE — H&P ADULT - ASSESSMENT
73 y/o female with PMHx of Gout, COPD, HTN, DM, CHF, CKD, Afib, Pulm HTN, Mitral and Tricuspid Valve replacement (mechanical valve on warfarin), on 3L home O2 presents to ED for evaluation of epistaxis now admitted due to concern of acute drop in hemoglobin and subtherapeutic INR.

## 2023-02-04 NOTE — H&P ADULT - PROBLEM SELECTOR PLAN 1
Currently epistaxis resolved. Subtherapeutic INR  -ENT not consulted in ED. If recurrent epistaxis, will consult ENT overnight  -Likely from dry air and constant use of NC  -Will resume anticoagulation due to high stroke risk from mechanical valve. Informed patient of risks and benefits of using AC. Patient agreeable and expressed understanding  -Will switch to nebulized air and saline nasal spray Q4H

## 2023-02-04 NOTE — H&P ADULT - PROBLEM SELECTOR PLAN 4
Controlled  -Resume home anti-HTN Takes levemir 25U qhs at home. Resume  -ISS with mealtime cut to half

## 2023-02-04 NOTE — ED ADULT NURSE REASSESSMENT NOTE - NS ED NURSE REASSESS COMMENT FT1
Received pt A&Ox4, ambulatory at baseline. Pt resting in stretcher on 3L NC. Respirations are even and unlabored. Pt nose currently not bleeding. Pt updated on plan of care. Awaiting x-ray results

## 2023-02-04 NOTE — H&P ADULT - TIME BILLING
Preparing to see the patient including review of tests and other providers' notes, confirming history with family member, performing medical examination and evaluation, counseling and educating the patient/family/caregiver, ordering medications, tests and procedures, communicating with other health care professionals, documenting clinical information in the EMR, independently interpreting results and communicating results to the patient/family/caregiven, care coordination

## 2023-02-04 NOTE — H&P ADULT - NSHPLABSRESULTS_GEN_ALL_CORE
02-04    143  |  100  |  81<H>  ----------------------------<  148<H>  5.6<H>   |  34<H>  |  2.08<H>    Ca    9.7      04 Feb 2023 19:27    TPro  7.1  /  Alb  3.8  /  TBili  0.3  /  DBili  x   /  AST  19  /  ALT  23  /  AlkPhos  98  02-04                        8.6    5.62  )-----------( 191      ( 04 Feb 2023 21:31 )             30.2     LIVER FUNCTIONS - ( 04 Feb 2023 19:27 )  Alb: 3.8 g/dL / Pro: 7.1 g/dL / ALK PHOS: 98 U/L / ALT: 23 U/L / AST: 19 U/L / GGT: x           PT/INR - ( 04 Feb 2023 19:27 )   PT: 12.1 sec;   INR: 1.04 ratio       PTT - ( 04 Feb 2023 19:27 )  PTT:33.3 sec    EKG: RBBB, , Old    Image: CXR unchanged from prior per read

## 2023-02-04 NOTE — ED PROVIDER NOTE - PHYSICAL EXAMINATION
General: appears chronically ill, AOx3  Skin: no rash, no pallor  Head: normocephalic, atraumatic  Eyes: clear conjunctiva, EOMI  ENMT: airway patent, no nasal discharge, dried blood in anterior nares b/l, no blood in posterior oropharynx, no active bleeding   Cardiovascular: normal rate, normal rhythm, S1/S2, no edema or calf tenderness b/l   Pulmonary: clear to auscultation bilaterally, no rales, rhonchi, or wheeze, mild tachypnea, pt reports baseline   Abdomen: soft, nontender  Musculoskeletal: moving extremities well, no deformity  Psych: normal mood, normal affect

## 2023-02-04 NOTE — ED PROVIDER NOTE - NSFOLLOWUPINSTRUCTIONS_ED_ALL_ED_FT
Please call your cardiologist tomorrow to discuss the INR level being low for mechanical valve. Please take your Coumadin tonight.  Please return for difficulty breathing, dizziness, shortness of breath, persistent bleeding

## 2023-02-04 NOTE — H&P ADULT - HISTORY OF PRESENT ILLNESS
71 y/o female with PMHx of Gout, COPD, HTN, DM, CHF, CKD, Afib, Pulm HTN, 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 Patient was recently here for COPD exacerbation and discharged home with therapeutic INR. She was in a relatively baseline state of health and uses 3L NC at home until this PM she started having diffuse epistaxis for about 30 minutes. She states it was jacey blood and she used up almost 1 roll of paper towels. She states she has been taking her medication consistently and able to state that she has taken her warfarin 5mg this AM. Denies chest pain, dyspnea, diarrhea, fever, chills, cough, URI symptoms, headaches. She does have chronic dizziness. Denies melena or hematochezia.  In the ED, NC3L, 168/69. Epistaxis stopped. INR subtherapeutic to 1~. Hgb to ~8 from 10 from last admission.

## 2023-02-05 DIAGNOSIS — Z29.9 ENCOUNTER FOR PROPHYLACTIC MEASURES, UNSPECIFIED: ICD-10-CM

## 2023-02-05 DIAGNOSIS — R04.0 EPISTAXIS: ICD-10-CM

## 2023-02-05 DIAGNOSIS — I10 ESSENTIAL (PRIMARY) HYPERTENSION: ICD-10-CM

## 2023-02-05 DIAGNOSIS — E11.9 TYPE 2 DIABETES MELLITUS WITHOUT COMPLICATIONS: ICD-10-CM

## 2023-02-05 DIAGNOSIS — J44.9 CHRONIC OBSTRUCTIVE PULMONARY DISEASE, UNSPECIFIED: ICD-10-CM

## 2023-02-05 DIAGNOSIS — I50.9 HEART FAILURE, UNSPECIFIED: ICD-10-CM

## 2023-02-05 DIAGNOSIS — I48.20 CHRONIC ATRIAL FIBRILLATION, UNSPECIFIED: ICD-10-CM

## 2023-02-05 DIAGNOSIS — Z95.2 PRESENCE OF PROSTHETIC HEART VALVE: ICD-10-CM

## 2023-02-05 LAB
APTT BLD: 160.3 SEC — CRITICAL HIGH (ref 27–36.3)
APTT BLD: >200 SEC — CRITICAL HIGH (ref 27–36.3)
APTT BLD: >200 SEC — SIGNIFICANT CHANGE UP (ref 27–36.3)
GLUCOSE BLDC GLUCOMTR-MCNC: 122 MG/DL — HIGH (ref 70–99)
GLUCOSE BLDC GLUCOMTR-MCNC: 135 MG/DL — HIGH (ref 70–99)
GLUCOSE BLDC GLUCOMTR-MCNC: 140 MG/DL — HIGH (ref 70–99)
GLUCOSE BLDC GLUCOMTR-MCNC: 93 MG/DL — SIGNIFICANT CHANGE UP (ref 70–99)
GLUCOSE BLDC GLUCOMTR-MCNC: 97 MG/DL — SIGNIFICANT CHANGE UP (ref 70–99)
HCT VFR BLD CALC: 31.5 % — LOW (ref 34.5–45)
HGB BLD-MCNC: 9 G/DL — LOW (ref 11.5–15.5)
INR BLD: 1.16 RATIO — SIGNIFICANT CHANGE UP (ref 0.88–1.16)
MCHC RBC-ENTMCNC: 28.6 GM/DL — LOW (ref 32–36)
MCHC RBC-ENTMCNC: 30.1 PG — SIGNIFICANT CHANGE UP (ref 27–34)
MCV RBC AUTO: 105.4 FL — HIGH (ref 80–100)
NRBC # BLD: 0 /100 WBCS — SIGNIFICANT CHANGE UP (ref 0–0)
NRBC # FLD: 0 K/UL — SIGNIFICANT CHANGE UP (ref 0–0)
PLATELET # BLD AUTO: 196 K/UL — SIGNIFICANT CHANGE UP (ref 150–400)
PROTHROM AB SERPL-ACNC: 13.5 SEC — HIGH (ref 10.5–13.4)
RBC # BLD: 2.99 M/UL — LOW (ref 3.8–5.2)
RBC # FLD: 14.4 % — SIGNIFICANT CHANGE UP (ref 10.3–14.5)
WBC # BLD: 5.28 K/UL — SIGNIFICANT CHANGE UP (ref 3.8–10.5)
WBC # FLD AUTO: 5.28 K/UL — SIGNIFICANT CHANGE UP (ref 3.8–10.5)

## 2023-02-05 PROCEDURE — 93010 ELECTROCARDIOGRAM REPORT: CPT

## 2023-02-05 RX ORDER — ISOSORBIDE MONONITRATE 60 MG/1
30 TABLET, EXTENDED RELEASE ORAL DAILY
Refills: 0 | Status: DISCONTINUED | OUTPATIENT
Start: 2023-02-05 | End: 2023-03-06

## 2023-02-05 RX ORDER — HYDRALAZINE HCL 50 MG
50 TABLET ORAL THREE TIMES A DAY
Refills: 0 | Status: DISCONTINUED | OUTPATIENT
Start: 2023-02-05 | End: 2023-02-28

## 2023-02-05 RX ORDER — AMIODARONE HYDROCHLORIDE 400 MG/1
200 TABLET ORAL DAILY
Refills: 0 | Status: DISCONTINUED | OUTPATIENT
Start: 2023-02-05 | End: 2023-03-06

## 2023-02-05 RX ORDER — WARFARIN SODIUM 2.5 MG/1
5 TABLET ORAL ONCE
Refills: 0 | Status: COMPLETED | OUTPATIENT
Start: 2023-02-05 | End: 2023-02-05

## 2023-02-05 RX ORDER — PANTOPRAZOLE SODIUM 20 MG/1
40 TABLET, DELAYED RELEASE ORAL
Refills: 0 | Status: DISCONTINUED | OUTPATIENT
Start: 2023-02-05 | End: 2023-03-06

## 2023-02-05 RX ORDER — DEXTROSE 50 % IN WATER 50 %
12.5 SYRINGE (ML) INTRAVENOUS ONCE
Refills: 0 | Status: DISCONTINUED | OUTPATIENT
Start: 2023-02-05 | End: 2023-03-06

## 2023-02-05 RX ORDER — HEPARIN SODIUM 5000 [USP'U]/ML
4000 INJECTION INTRAVENOUS; SUBCUTANEOUS EVERY 6 HOURS
Refills: 0 | Status: DISCONTINUED | OUTPATIENT
Start: 2023-02-05 | End: 2023-02-12

## 2023-02-05 RX ORDER — LANOLIN ALCOHOL/MO/W.PET/CERES
3 CREAM (GRAM) TOPICAL AT BEDTIME
Refills: 0 | Status: DISCONTINUED | OUTPATIENT
Start: 2023-02-05 | End: 2023-03-06

## 2023-02-05 RX ORDER — FERROUS SULFATE 325(65) MG
1 TABLET ORAL
Qty: 0 | Refills: 0 | DISCHARGE

## 2023-02-05 RX ORDER — HEPARIN SODIUM 5000 [USP'U]/ML
INJECTION INTRAVENOUS; SUBCUTANEOUS
Qty: 25000 | Refills: 0 | Status: DISCONTINUED | OUTPATIENT
Start: 2023-02-05 | End: 2023-02-12

## 2023-02-05 RX ORDER — SODIUM CHLORIDE 9 MG/ML
1000 INJECTION, SOLUTION INTRAVENOUS
Refills: 0 | Status: DISCONTINUED | OUTPATIENT
Start: 2023-02-05 | End: 2023-03-06

## 2023-02-05 RX ORDER — HEPARIN SODIUM 5000 [USP'U]/ML
9000 INJECTION INTRAVENOUS; SUBCUTANEOUS ONCE
Refills: 0 | Status: COMPLETED | OUTPATIENT
Start: 2023-02-05 | End: 2023-02-05

## 2023-02-05 RX ORDER — BUDESONIDE AND FORMOTEROL FUMARATE DIHYDRATE 160; 4.5 UG/1; UG/1
2 AEROSOL RESPIRATORY (INHALATION)
Refills: 0 | Status: DISCONTINUED | OUTPATIENT
Start: 2023-02-05 | End: 2023-03-06

## 2023-02-05 RX ORDER — ALBUTEROL 90 UG/1
2 AEROSOL, METERED ORAL
Qty: 0 | Refills: 0 | DISCHARGE

## 2023-02-05 RX ORDER — ACETAMINOPHEN 500 MG
650 TABLET ORAL EVERY 6 HOURS
Refills: 0 | Status: DISCONTINUED | OUTPATIENT
Start: 2023-02-05 | End: 2023-02-27

## 2023-02-05 RX ORDER — ALBUTEROL 90 UG/1
2 AEROSOL, METERED ORAL EVERY 6 HOURS
Refills: 0 | Status: DISCONTINUED | OUTPATIENT
Start: 2023-02-05 | End: 2023-03-06

## 2023-02-05 RX ORDER — SIMVASTATIN 20 MG/1
1 TABLET, FILM COATED ORAL
Qty: 0 | Refills: 0 | DISCHARGE

## 2023-02-05 RX ORDER — DEXTROSE 50 % IN WATER 50 %
25 SYRINGE (ML) INTRAVENOUS ONCE
Refills: 0 | Status: DISCONTINUED | OUTPATIENT
Start: 2023-02-05 | End: 2023-03-06

## 2023-02-05 RX ORDER — BUMETANIDE 0.25 MG/ML
2 INJECTION INTRAMUSCULAR; INTRAVENOUS DAILY
Refills: 0 | Status: DISCONTINUED | OUTPATIENT
Start: 2023-02-05 | End: 2023-02-15

## 2023-02-05 RX ORDER — INSULIN LISPRO 100/ML
VIAL (ML) SUBCUTANEOUS AT BEDTIME
Refills: 0 | Status: DISCONTINUED | OUTPATIENT
Start: 2023-02-05 | End: 2023-03-06

## 2023-02-05 RX ORDER — FERROUS SULFATE 325(65) MG
325 TABLET ORAL DAILY
Refills: 0 | Status: DISCONTINUED | OUTPATIENT
Start: 2023-02-05 | End: 2023-03-06

## 2023-02-05 RX ORDER — HEPARIN SODIUM 5000 [USP'U]/ML
9000 INJECTION INTRAVENOUS; SUBCUTANEOUS EVERY 6 HOURS
Refills: 0 | Status: DISCONTINUED | OUTPATIENT
Start: 2023-02-05 | End: 2023-02-12

## 2023-02-05 RX ORDER — GLUCAGON INJECTION, SOLUTION 0.5 MG/.1ML
1 INJECTION, SOLUTION SUBCUTANEOUS ONCE
Refills: 0 | Status: DISCONTINUED | OUTPATIENT
Start: 2023-02-05 | End: 2023-03-06

## 2023-02-05 RX ORDER — ONDANSETRON 8 MG/1
4 TABLET, FILM COATED ORAL EVERY 8 HOURS
Refills: 0 | Status: DISCONTINUED | OUTPATIENT
Start: 2023-02-05 | End: 2023-02-13

## 2023-02-05 RX ORDER — WARFARIN SODIUM 2.5 MG/1
5 TABLET ORAL DAILY
Refills: 0 | Status: DISCONTINUED | OUTPATIENT
Start: 2023-02-05 | End: 2023-02-05

## 2023-02-05 RX ORDER — INSULIN LISPRO 100/ML
VIAL (ML) SUBCUTANEOUS
Refills: 0 | Status: DISCONTINUED | OUTPATIENT
Start: 2023-02-05 | End: 2023-03-06

## 2023-02-05 RX ORDER — INSULIN DETEMIR 100/ML (3)
25 INSULIN PEN (ML) SUBCUTANEOUS AT BEDTIME
Refills: 0 | Status: DISCONTINUED | OUTPATIENT
Start: 2023-02-05 | End: 2023-02-28

## 2023-02-05 RX ORDER — INSULIN LISPRO 100/ML
5 VIAL (ML) SUBCUTANEOUS
Refills: 0 | Status: DISCONTINUED | OUTPATIENT
Start: 2023-02-05 | End: 2023-03-06

## 2023-02-05 RX ORDER — DEXTROSE 50 % IN WATER 50 %
15 SYRINGE (ML) INTRAVENOUS ONCE
Refills: 0 | Status: DISCONTINUED | OUTPATIENT
Start: 2023-02-05 | End: 2023-03-06

## 2023-02-05 RX ORDER — WARFARIN SODIUM 2.5 MG/1
7.5 TABLET ORAL ONCE
Refills: 0 | Status: DISCONTINUED | OUTPATIENT
Start: 2023-02-05 | End: 2023-02-05

## 2023-02-05 RX ORDER — SIMVASTATIN 20 MG/1
10 TABLET, FILM COATED ORAL AT BEDTIME
Refills: 0 | Status: DISCONTINUED | OUTPATIENT
Start: 2023-02-05 | End: 2023-03-06

## 2023-02-05 RX ORDER — SODIUM CHLORIDE 0.65 %
1 AEROSOL, SPRAY (ML) NASAL EVERY 4 HOURS
Refills: 0 | Status: DISCONTINUED | OUTPATIENT
Start: 2023-02-05 | End: 2023-03-06

## 2023-02-05 RX ADMIN — BUDESONIDE AND FORMOTEROL FUMARATE DIHYDRATE 2 PUFF(S): 160; 4.5 AEROSOL RESPIRATORY (INHALATION) at 22:22

## 2023-02-05 RX ADMIN — HEPARIN SODIUM 1900 UNIT(S)/HR: 5000 INJECTION INTRAVENOUS; SUBCUTANEOUS at 01:05

## 2023-02-05 RX ADMIN — PANTOPRAZOLE SODIUM 40 MILLIGRAM(S): 20 TABLET, DELAYED RELEASE ORAL at 06:49

## 2023-02-05 RX ADMIN — HEPARIN SODIUM 1600 UNIT(S)/HR: 5000 INJECTION INTRAVENOUS; SUBCUTANEOUS at 11:30

## 2023-02-05 RX ADMIN — Medication 1 SPRAY(S): at 22:24

## 2023-02-05 RX ADMIN — Medication 325 MILLIGRAM(S): at 11:56

## 2023-02-05 RX ADMIN — Medication 50 MILLIGRAM(S): at 22:23

## 2023-02-05 RX ADMIN — Medication 5 UNIT(S): at 08:52

## 2023-02-05 RX ADMIN — SIMVASTATIN 10 MILLIGRAM(S): 20 TABLET, FILM COATED ORAL at 22:23

## 2023-02-05 RX ADMIN — HEPARIN SODIUM 1300 UNIT(S)/HR: 5000 INJECTION INTRAVENOUS; SUBCUTANEOUS at 20:10

## 2023-02-05 RX ADMIN — HEPARIN SODIUM 1900 UNIT(S)/HR: 5000 INJECTION INTRAVENOUS; SUBCUTANEOUS at 08:28

## 2023-02-05 RX ADMIN — AMIODARONE HYDROCHLORIDE 200 MILLIGRAM(S): 400 TABLET ORAL at 06:50

## 2023-02-05 RX ADMIN — BUMETANIDE 2 MILLIGRAM(S): 0.25 INJECTION INTRAMUSCULAR; INTRAVENOUS at 06:49

## 2023-02-05 RX ADMIN — Medication 25 UNIT(S): at 22:23

## 2023-02-05 RX ADMIN — WARFARIN SODIUM 5 MILLIGRAM(S): 2.5 TABLET ORAL at 01:05

## 2023-02-05 RX ADMIN — WARFARIN SODIUM 5 MILLIGRAM(S): 2.5 TABLET ORAL at 22:23

## 2023-02-05 RX ADMIN — HEPARIN SODIUM 0 UNIT(S)/HR: 5000 INJECTION INTRAVENOUS; SUBCUTANEOUS at 18:55

## 2023-02-05 RX ADMIN — Medication 5 MILLIGRAM(S): at 06:49

## 2023-02-05 RX ADMIN — Medication 1 SPRAY(S): at 03:51

## 2023-02-05 RX ADMIN — Medication 50 MILLIGRAM(S): at 06:49

## 2023-02-05 RX ADMIN — HEPARIN SODIUM 1600 UNIT(S)/HR: 5000 INJECTION INTRAVENOUS; SUBCUTANEOUS at 18:08

## 2023-02-05 RX ADMIN — Medication 50 MILLIGRAM(S): at 13:31

## 2023-02-05 RX ADMIN — Medication 5 UNIT(S): at 12:29

## 2023-02-05 RX ADMIN — Medication 5 UNIT(S): at 18:08

## 2023-02-05 RX ADMIN — Medication 1 SPRAY(S): at 18:08

## 2023-02-05 RX ADMIN — ISOSORBIDE MONONITRATE 30 MILLIGRAM(S): 60 TABLET, EXTENDED RELEASE ORAL at 11:56

## 2023-02-05 RX ADMIN — BUDESONIDE AND FORMOTEROL FUMARATE DIHYDRATE 2 PUFF(S): 160; 4.5 AEROSOL RESPIRATORY (INHALATION) at 08:55

## 2023-02-05 RX ADMIN — HEPARIN SODIUM 0 UNIT(S)/HR: 5000 INJECTION INTRAVENOUS; SUBCUTANEOUS at 08:32

## 2023-02-05 RX ADMIN — HEPARIN SODIUM 9000 UNIT(S): 5000 INJECTION INTRAVENOUS; SUBCUTANEOUS at 01:05

## 2023-02-05 RX ADMIN — HEPARIN SODIUM 1300 UNIT(S)/HR: 5000 INJECTION INTRAVENOUS; SUBCUTANEOUS at 20:08

## 2023-02-05 RX ADMIN — Medication 25 UNIT(S): at 03:45

## 2023-02-05 NOTE — CONSULT NOTE ADULT - TIME BILLING
- Review of records, telemetry, vital signs and daily labs.   - General and cardiovascular physical examination.  - Generation of cardiovascular treatment plan.  - Coordination of care.      Patient was seen and examined by me on 02/05/2023,interim events noted,labs and radiology studies reviewed.  Edwar Merida MD,FACC.  [address] [place], [place] [id number].  [phone]

## 2023-02-05 NOTE — PATIENT PROFILE ADULT - FALL HARM RISK - HARM RISK INTERVENTIONS

## 2023-02-05 NOTE — CHART NOTE - NSCHARTNOTEFT_GEN_A_CORE
Spoke with pharmacy regarding Warfarin dosing, would give another 5mg and repeat INR in the morning  confirmed that APTT >200 does not affect coumadin bridge     Evelyn Medrano PA-C  Medicine Team  In House [id number]

## 2023-02-05 NOTE — PATIENT PROFILE ADULT - FUNCTIONAL ASSESSMENT - BASIC MOBILITY 6.
2-calculated by average/Not able to assess (calculate score using [facility] averaging method)

## 2023-02-05 NOTE — CHART NOTE - NSCHARTNOTEFT_GEN_A_CORE
Called bedside to assess patient for syncope  Patient complaining of tunnel vision w/ the room spinning, has had episodes like this in the past  Some improvement laying down  No headache, chest pain, or increased shortness of breath (patient on her usual 3L NC)    Vital Signs Last 24 Hrs  T(C): 36.3 (05 Feb 2023 09:16), Max: 36.7 (04 Feb 2023 18:29)  T(F): 97.4 (05 Feb 2023 09:16), Max: 98 (04 Feb 2023 18:29)  HR: 69 (05 Feb 2023 09:16) (64 - 71)  BP: 140/52 (05 Feb 2023 09:16) (125/54 - 168/69)  BP(mean): --  RR: 18 (05 Feb 2023 09:16) (18 - 22)  SpO2: 99% (05 Feb 2023 09:16) (97% - 100%)    Parameters below as of 05 Feb 2023 09:16  Patient On (Oxygen Delivery Method): nasal cannula w/ humidification  O2 Flow (L/min): 3      Orthostatics/EKG ordered, will closely monitor for any changes in symptoms or signs of bleeding (admitted for epistaxis)  Plan discussed with patient and nurse    Evelyn Medrano PA-C  Medicine Team  In House [id number]  '

## 2023-02-05 NOTE — PROGRESS NOTE ADULT - SUBJECTIVE AND OBJECTIVE BOX
SUBJECTIVE / OVERNIGHT EVENTS:pt seen and examined  02-05-23    MEDICATIONS  (STANDING):  aMIOdarone    Tablet 200 milliGRAM(s) Oral daily  budesonide 160 MICROgram(s)/formoterol 4.5 MICROgram(s) Inhaler 2 Puff(s) Inhalation two times a day  buMETAnide 2 milliGRAM(s) Oral daily  dextrose 5%. 1000 milliLiter(s) (50 mL/Hr) IV Continuous <Continuous>  dextrose 5%. 1000 milliLiter(s) (100 mL/Hr) IV Continuous <Continuous>  dextrose 50% Injectable 25 Gram(s) IV Push once  dextrose 50% Injectable 12.5 Gram(s) IV Push once  dextrose 50% Injectable 25 Gram(s) IV Push once  ferrous    sulfate 325 milliGRAM(s) Oral daily  glucagon  Injectable 1 milliGRAM(s) IntraMuscular once  heparin  Infusion.  Unit(s)/Hr (19 mL/Hr) IV Continuous <Continuous>  hydrALAZINE 50 milliGRAM(s) Oral three times a day  insulin detemir injectable (LEVEMIR) 25 Unit(s) SubCutaneous at bedtime  insulin lispro (ADMELOG) corrective regimen sliding scale   SubCutaneous three times a day before meals  insulin lispro (ADMELOG) corrective regimen sliding scale   SubCutaneous at bedtime  insulin lispro Injectable (ADMELOG) 5 Unit(s) SubCutaneous three times a day before meals  isosorbide   mononitrate ER Tablet (IMDUR) 30 milliGRAM(s) Oral daily  pantoprazole    Tablet 40 milliGRAM(s) Oral before breakfast  predniSONE   Tablet 5 milliGRAM(s) Oral daily  simvastatin 10 milliGRAM(s) Oral at bedtime  sodium chloride 0.65% Nasal 1 Spray(s) Both Nostrils every 4 hours  warfarin 5 milliGRAM(s) Oral once    MEDICATIONS  (PRN):  acetaminophen     Tablet .. 650 milliGRAM(s) Oral every 6 hours PRN Temp greater or equal to 38C (100.4F), Mild Pain (1 - 3)  albuterol    90 MICROgram(s) HFA Inhaler 2 Puff(s) Inhalation every 6 hours PRN Bronchospasm  aluminum hydroxide/magnesium hydroxide/simethicone Suspension 30 milliLiter(s) Oral every 4 hours PRN Dyspepsia  dextrose Oral Gel 15 Gram(s) Oral once PRN Blood Glucose LESS THAN 70 milliGRAM(s)/deciliter  heparin   Injectable 9000 Unit(s) IV Push every 6 hours PRN For aPTT less than 40  heparin   Injectable 4000 Unit(s) IV Push every 6 hours PRN For aPTT between 40 - 57  melatonin 3 milliGRAM(s) Oral at bedtime PRN Insomnia  ondansetron Injectable 4 milliGRAM(s) IV Push every 8 hours PRN Nausea and/or Vomiting    T(C): 36.7 (02-05-23 @ 13:31), Max: 36.7 (02-05-23 @ 13:31)  HR: 61 (02-05-23 @ 13:31) (61 - 69)  BP: 113/58 (02-05-23 @ 13:31) (113/58 - 147/59)  RR: 18 (02-05-23 @ 13:31) (18 - 20)  SpO2: 100% (02-05-23 @ 13:31) (97% - 100%)    CAPILLARY BLOOD GLUCOSE      POCT Blood Glucose.: 97 mg/dL (05 Feb 2023 17:47)  POCT Blood Glucose.: 140 mg/dL (05 Feb 2023 12:22)  POCT Blood Glucose.: 135 mg/dL (05 Feb 2023 08:27)  POCT Blood Glucose.: 122 mg/dL (05 Feb 2023 03:42)    I&O's Summary    05 Feb 2023 07:01  -  05 Feb 2023 20:32  --------------------------------------------------------  IN: 0 mL / OUT: 600 mL / NET: -600 mL        Constitutional: No fever, fatigue  Skin: No rash.  Eyes: No recent vision problems or eye pain.  ENT: No congestion, ear pain, or sore throat.  Cardiovascular: No chest pain or palpation.  Respiratory: No cough, shortness of breath, congestion, or wheezing.  Gastrointestinal: No abdominal pain, nausea, vomiting, or diarrhea.  Genitourinary: No dysuria.  Musculoskeletal: No joint swelling.  Neurologic: No headache.    PHYSICAL EXAM:  GENERAL: NAD  EYES: EOMI, PERRLA  NECK: Supple, No JVD  CHEST/LUNG: dec breath sounds at bases  HEART:  S1 , S2 +  ABDOMEN: soft, bs+  EXTREMITIES:  edema+  NEUROLOGY:alert awake oriented      LABS:                        9.0    5.28  )-----------( 196      ( 05 Feb 2023 07:15 )             31.5     02-04    143  |  100  |  81<H>  ----------------------------<  148<H>  5.6<H>   |  34<H>  |  2.08<H>    Ca    9.7      04 Feb 2023 19:27    TPro  7.1  /  Alb  3.8  /  TBili  0.3  /  DBili  x   /  AST  19  /  ALT  23  /  AlkPhos  98  02-04    PT/INR - ( 05 Feb 2023 15:51 )   PT: 13.5 sec;   INR: 1.16 ratio         PTT - ( 05 Feb 2023 17:46 )  PTT:160.3 sec          RADIOLOGY & ADDITIONAL TESTS:    Imaging Personally Reviewed:yes    Consultant(s) Notes Reviewed:  yes    Care Discussed with Consultants/Other Providers:yes

## 2023-02-05 NOTE — CONSULT NOTE ADULT - SUBJECTIVE AND OBJECTIVE BOX
DATE OF SERVICE:02-05-23 @ 17:33    Patient was seen,examined and evaluated  by me.ER evaluation, Labs and Hospital course was reviewed,    CHIEF COMPLAINT:Epistaxis    HPI:HPI:  71 y/o female with PMHx of Gout, COPD, HTN, DM, CHF, CKD, Afib, Pulm HTN, 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 Patient was recently here for COPD exacerbation and discharged home with therapeutic INR. She was in a relatively baseline state of health and uses 3L NC at home until this PM she started having diffuse epistaxis for about 30 minutes. She states it was jacey blood and she used up almost 1 roll of paper towels. She states she has been taking her medication consistently and able to state that she has taken her warfarin 5mg this AM. Denies chest pain, dyspnea, diarrhea, fever, chills, cough, URI symptoms, headaches. She does have chronic dizziness. Denies melena or hematochezia.  In the ED, NC3L, 168/69. Epistaxis stopped. INR subtherapeutic to 1~. Hgb to ~8 from 10 from last admission. (04 Feb 2023 23:34)      PAST MEDICAL & SURGICAL HISTORY:  Pulmonary hypertension      MIGUEL (obstructive sleep apnea)      Gout      Mitral valve replaced      Tricuspid valve replaced      CHF (congestive heart failure)      Hypertension      Chronic atrial fibrillation      HTN (hypertension)      COPD, severe      History of mitral valve replacement with mechanical valve      History of mitral valve replacement with mechanical valve      Tricuspid valve replaced  mechanical          MEDICATIONS  (STANDING):  aMIOdarone    Tablet 200 milliGRAM(s) Oral daily  budesonide 160 MICROgram(s)/formoterol 4.5 MICROgram(s) Inhaler 2 Puff(s) Inhalation two times a day  buMETAnide 2 milliGRAM(s) Oral daily  dextrose 5%. 1000 milliLiter(s) (50 mL/Hr) IV Continuous <Continuous>  dextrose 5%. 1000 milliLiter(s) (100 mL/Hr) IV Continuous <Continuous>  dextrose 50% Injectable 25 Gram(s) IV Push once  dextrose 50% Injectable 12.5 Gram(s) IV Push once  dextrose 50% Injectable 25 Gram(s) IV Push once  ferrous    sulfate 325 milliGRAM(s) Oral daily  glucagon  Injectable 1 milliGRAM(s) IntraMuscular once  heparin  Infusion.  Unit(s)/Hr (19 mL/Hr) IV Continuous <Continuous>  hydrALAZINE 50 milliGRAM(s) Oral three times a day  insulin detemir injectable (LEVEMIR) 25 Unit(s) SubCutaneous at bedtime  insulin lispro (ADMELOG) corrective regimen sliding scale   SubCutaneous three times a day before meals  insulin lispro (ADMELOG) corrective regimen sliding scale   SubCutaneous at bedtime  insulin lispro Injectable (ADMELOG) 5 Unit(s) SubCutaneous three times a day before meals  isosorbide   mononitrate ER Tablet (IMDUR) 30 milliGRAM(s) Oral daily  pantoprazole    Tablet 40 milliGRAM(s) Oral before breakfast  predniSONE   Tablet 5 milliGRAM(s) Oral daily  simvastatin 10 milliGRAM(s) Oral at bedtime  sodium chloride 0.65% Nasal 1 Spray(s) Both Nostrils every 4 hours  warfarin 7.5 milliGRAM(s) Oral once    MEDICATIONS  (PRN):  acetaminophen     Tablet .. 650 milliGRAM(s) Oral every 6 hours PRN Temp greater or equal to 38C (100.4F), Mild Pain (1 - 3)  albuterol    90 MICROgram(s) HFA Inhaler 2 Puff(s) Inhalation every 6 hours PRN Bronchospasm  aluminum hydroxide/magnesium hydroxide/simethicone Suspension 30 milliLiter(s) Oral every 4 hours PRN Dyspepsia  dextrose Oral Gel 15 Gram(s) Oral once PRN Blood Glucose LESS THAN 70 milliGRAM(s)/deciliter  heparin   Injectable 9000 Unit(s) IV Push every 6 hours PRN For aPTT less than 40  heparin   Injectable 4000 Unit(s) IV Push every 6 hours PRN For aPTT between 40 - 57  melatonin 3 milliGRAM(s) Oral at bedtime PRN Insomnia  ondansetron Injectable 4 milliGRAM(s) IV Push every 8 hours PRN Nausea and/or Vomiting      FAMILY HISTORY:  Family history of hypertension (Father, Sibling)    Family history of stroke    Family history of hypertension (Mother)      No family history of premature coronary artery disease or sudden cardiac death    SOCIAL HISTORY:  Smoking-[ ] Active  [ ] Former [ ] Non Smoker  Alcohol-[ ] Denies [ ] Social [ ] Daily  Ilicit Drug use-[ ] Denies [ ] Active user    REVIEW OF SYSTEMS:  Constitutional: [ ] fever, [ ]weight loss, [ ]fatigue   Activity [ ] Bedbound,[ ] Ambulates [ ] Unassisted[ ] Cane/Walker [ ] Assistence.  Effort tolerance:[ ] Excellent [ ] Good [ ] Fair [ ] Poor [ ]  Eyes: [ ] visual changes  Respiratory: [ ]shortness of breath;  [ ] cough, [ ]wheezing, [ ]chills, [ ]hemoptysis  Cardiovascular: [ ] chest pain, [ ]palpitations, [ ]dizziness,  [ ]leg swelling[ ]orthopnea [ ]PND  Gastrointestinal: [ ] abdominal pain, [ ]nausea, [ ]vomiting,  [ ]diarrhea,[ ]constipation  Genitourinary: [ ] dysuria, [ ] hematuria  Neurologic: [ ] headaches [ ] tremors[ ] weakness  Skin: [ ] itching, [ ]burning, [ ] rashes  Endocrine: [ ] heat or cold intolerance  Musculoskeletal: [ ] joint pain or swelling; [ ] muscle, back, or extremity pain  Psychiatric: [ ] depression, [ ]anxiety, [ ]mood swings, or [ ]difficulty sleeping  Hematologic: [ ] easy bruising, [ ] bleeding gums       [ x] All others negative	  [ ] Unable to obtain    Vital Signs Last 24 Hrs  T(C): 36.7 (05 Feb 2023 13:31), Max: 36.7 (04 Feb 2023 18:29)  T(F): 98 (05 Feb 2023 13:31), Max: 98 (04 Feb 2023 18:29)  HR: 61 (05 Feb 2023 13:31) (61 - 71)  BP: 113/58 (05 Feb 2023 13:31) (113/58 - 168/69)  BP(mean): --  RR: 18 (05 Feb 2023 13:31) (18 - 22)  SpO2: 100% (05 Feb 2023 13:31) (97% - 100%)    Parameters below as of 05 Feb 2023 13:31  Patient On (Oxygen Delivery Method): nasal cannula w/ humidification  O2 Flow (L/min): 3    I&O's Summary    05 Feb 2023 07:01  -  05 Feb 2023 17:33  --------------------------------------------------------  IN: 0 mL / OUT: 600 mL / NET: -600 mL        PHYSICAL EXAM:  General: No acute distress BMI-  HEENT: EOMI, PERRL[ ] Icteric  Neck: Supple, No JVD  Lungs: Equal air entry bilaterally; [ ] Rales [ ] Rhonchi [ ] Wheezing  Heart: Regular rate and rhythm;[ ] Murmurs-   /6 [ ] Systolic [ ] Diastolic [ ] Radiation,No rubs, or gallops  Abdomen: Nontender, bowel sounds present  Extremities: No clubbing, cyanosis, or edema[ ] Calf tenderness  Nervous system:  Alert & Oriented X3, no focal deficits  Psychiatric: Normal affect  Skin: No rashes or lesions      LABS:  02-04    143  |  100  |  81<H>  ----------------------------<  148<H>  5.6<H>   |  34<H>  |  2.08<H>    Ca    9.7      04 Feb 2023 19:27    TPro  7.1  /  Alb  3.8  /  TBili  0.3  /  DBili  x   /  AST  19  /  ALT  23  /  AlkPhos  98  02-04    Creatinine Trend: 2.08<--, 2.27<--, 2.32<--, 2.55<--, 2.48<--, 2.37<--                        9.0    5.28  )-----------( 196      ( 05 Feb 2023 07:15 )             31.5     PT/INR - ( 05 Feb 2023 15:51 )   PT: 13.5 sec;   INR: 1.16 ratio         PTT - ( 05 Feb 2023 15:51 )  PTT:>200.0 sec    Lipid Panel:   Cardiac Enzymes:

## 2023-02-05 NOTE — CONSULT NOTE ADULT - ASSESSMENT
71 y/o female with PMHx of Gout, COPD, HTN, DM, CHF, CKD, Afib, Pulm HTN, Mitral and Tricuspid Valve replacement (mechanical valve on warfarin), on 3L home O2 presents to ED for evaluation of epistaxis now admitted due to concern of acute drop in hemoglobin and subtherapeutic INR.    Problem/Plan - 1:  ·  Problem: Epistaxis.   ·  Plan: Currently epistaxis resolved. Subtherapeutic INR  -ENT not consulted in ED. If recurrent epistaxis, will consult ENT overnight  -Likely from dry air and constant use of NC  -Will resume anticoagulation due to high stroke risk from mechanical valve. Informed patient of risks and benefits of using AC. Patient agreeable and expressed understanding  -Will switch to nebulized air and saline nasal spray Q4H.    Problem/Plan - 2:  ·  Problem: History of mitral valve replacement with mechanical valve.   ·  Plan: Currently subtherapeutic INR. High stroke risk. No recent changes in med. Has been on amiodarone for a long time. There is concern for adherence  -Will resume anticoagulation due to high stroke risk from mechanical valve. Informed patient of risks and benefits of using AC. Patient agreeable and expressed understanding  -Heparin drip and resuming home warfarin with extra 5mg tonight.    Problem/Plan - 3:  ·  Problem: COPD, severe.   ·  Plan: No wheezing  -Resume home 3L NC, prednisone, inhalers.    Problem/Plan - 4:  ·  Problem: Type 2 diabetes mellitus.   ·  Plan: Takes levemir 25U qhs at home. Resume  -ISS with mealtime cut to half.    Problem/Plan - 5:  ·  Problem: Hypertension.   ·  Plan: Controlled  -Resume home anti-HTN.    Problem/Plan - 6:  ·  Problem: CHF (congestive heart failure).   ·  Plan: Currently euvolemic.    Problem/Plan - 7:  ·  Problem: Chronic atrial fibrillation.   ·  Plan: Rate controlled  Currently on AC.    Problem/Plan - 8:  ·  Problem: Need for prophylactic measure.   ·  Plan: FENP: No IVF, Lytes PRN, Regular diet, heparin.

## 2023-02-06 LAB
ANION GAP SERPL CALC-SCNC: 6 MMOL/L — LOW (ref 7–14)
APTT BLD: 64.7 SEC — HIGH (ref 27–36.3)
APTT BLD: 66.9 SEC — HIGH (ref 27–36.3)
BUN SERPL-MCNC: 78 MG/DL — HIGH (ref 7–23)
CALCIUM SERPL-MCNC: 10.2 MG/DL — SIGNIFICANT CHANGE UP (ref 8.4–10.5)
CHLORIDE SERPL-SCNC: 97 MMOL/L — LOW (ref 98–107)
CO2 SERPL-SCNC: 37 MMOL/L — HIGH (ref 22–31)
CREAT SERPL-MCNC: 1.94 MG/DL — HIGH (ref 0.5–1.3)
EGFR: 27 ML/MIN/1.73M2 — LOW
GLUCOSE BLDC GLUCOMTR-MCNC: 120 MG/DL — HIGH (ref 70–99)
GLUCOSE BLDC GLUCOMTR-MCNC: 124 MG/DL — HIGH (ref 70–99)
GLUCOSE BLDC GLUCOMTR-MCNC: 147 MG/DL — HIGH (ref 70–99)
GLUCOSE BLDC GLUCOMTR-MCNC: 186 MG/DL — HIGH (ref 70–99)
GLUCOSE SERPL-MCNC: 110 MG/DL — HIGH (ref 70–99)
HCT VFR BLD CALC: 31.1 % — LOW (ref 34.5–45)
HGB BLD-MCNC: 8.8 G/DL — LOW (ref 11.5–15.5)
INR BLD: 1.11 RATIO — SIGNIFICANT CHANGE UP (ref 0.88–1.16)
MAGNESIUM SERPL-MCNC: 2.3 MG/DL — SIGNIFICANT CHANGE UP (ref 1.6–2.6)
MCHC RBC-ENTMCNC: 28.3 GM/DL — LOW (ref 32–36)
MCHC RBC-ENTMCNC: 30.3 PG — SIGNIFICANT CHANGE UP (ref 27–34)
MCV RBC AUTO: 107.2 FL — HIGH (ref 80–100)
NRBC # BLD: 0 /100 WBCS — SIGNIFICANT CHANGE UP (ref 0–0)
NRBC # FLD: 0 K/UL — SIGNIFICANT CHANGE UP (ref 0–0)
PHOSPHATE SERPL-MCNC: 3.5 MG/DL — SIGNIFICANT CHANGE UP (ref 2.5–4.5)
PLATELET # BLD AUTO: 215 K/UL — SIGNIFICANT CHANGE UP (ref 150–400)
POTASSIUM SERPL-MCNC: 4.9 MMOL/L — SIGNIFICANT CHANGE UP (ref 3.5–5.3)
POTASSIUM SERPL-SCNC: 4.9 MMOL/L — SIGNIFICANT CHANGE UP (ref 3.5–5.3)
PROTHROM AB SERPL-ACNC: 12.9 SEC — SIGNIFICANT CHANGE UP (ref 10.5–13.4)
RBC # BLD: 2.9 M/UL — LOW (ref 3.8–5.2)
RBC # FLD: 14.6 % — HIGH (ref 10.3–14.5)
SODIUM SERPL-SCNC: 140 MMOL/L — SIGNIFICANT CHANGE UP (ref 135–145)
WBC # BLD: 5.57 K/UL — SIGNIFICANT CHANGE UP (ref 3.8–10.5)
WBC # FLD AUTO: 5.57 K/UL — SIGNIFICANT CHANGE UP (ref 3.8–10.5)

## 2023-02-06 PROCEDURE — 99223 1ST HOSP IP/OBS HIGH 75: CPT

## 2023-02-06 RX ORDER — WARFARIN SODIUM 2.5 MG/1
5 TABLET ORAL ONCE
Refills: 0 | Status: COMPLETED | OUTPATIENT
Start: 2023-02-06 | End: 2023-02-06

## 2023-02-06 RX ADMIN — Medication 325 MILLIGRAM(S): at 17:01

## 2023-02-06 RX ADMIN — AMIODARONE HYDROCHLORIDE 200 MILLIGRAM(S): 400 TABLET ORAL at 06:37

## 2023-02-06 RX ADMIN — HEPARIN SODIUM 1300 UNIT(S)/HR: 5000 INJECTION INTRAVENOUS; SUBCUTANEOUS at 03:19

## 2023-02-06 RX ADMIN — Medication 50 MILLIGRAM(S): at 06:37

## 2023-02-06 RX ADMIN — Medication 650 MILLIGRAM(S): at 14:21

## 2023-02-06 RX ADMIN — Medication 50 MILLIGRAM(S): at 13:51

## 2023-02-06 RX ADMIN — Medication 1 SPRAY(S): at 21:23

## 2023-02-06 RX ADMIN — Medication 25 UNIT(S): at 21:24

## 2023-02-06 RX ADMIN — Medication 1 SPRAY(S): at 02:30

## 2023-02-06 RX ADMIN — HEPARIN SODIUM 1300 UNIT(S)/HR: 5000 INJECTION INTRAVENOUS; SUBCUTANEOUS at 08:43

## 2023-02-06 RX ADMIN — SIMVASTATIN 10 MILLIGRAM(S): 20 TABLET, FILM COATED ORAL at 21:23

## 2023-02-06 RX ADMIN — Medication 1 SPRAY(S): at 17:01

## 2023-02-06 RX ADMIN — HEPARIN SODIUM 1300 UNIT(S)/HR: 5000 INJECTION INTRAVENOUS; SUBCUTANEOUS at 14:26

## 2023-02-06 RX ADMIN — PANTOPRAZOLE SODIUM 40 MILLIGRAM(S): 20 TABLET, DELAYED RELEASE ORAL at 06:37

## 2023-02-06 RX ADMIN — Medication 650 MILLIGRAM(S): at 13:51

## 2023-02-06 RX ADMIN — Medication 5 UNIT(S): at 09:08

## 2023-02-06 RX ADMIN — Medication 1 SPRAY(S): at 12:18

## 2023-02-06 RX ADMIN — BUMETANIDE 2 MILLIGRAM(S): 0.25 INJECTION INTRAMUSCULAR; INTRAVENOUS at 06:37

## 2023-02-06 RX ADMIN — BUDESONIDE AND FORMOTEROL FUMARATE DIHYDRATE 2 PUFF(S): 160; 4.5 AEROSOL RESPIRATORY (INHALATION) at 21:23

## 2023-02-06 RX ADMIN — WARFARIN SODIUM 5 MILLIGRAM(S): 2.5 TABLET ORAL at 21:23

## 2023-02-06 RX ADMIN — Medication 1 SPRAY(S): at 06:37

## 2023-02-06 RX ADMIN — ISOSORBIDE MONONITRATE 30 MILLIGRAM(S): 60 TABLET, EXTENDED RELEASE ORAL at 12:41

## 2023-02-06 RX ADMIN — Medication 5 UNIT(S): at 12:18

## 2023-02-06 RX ADMIN — BUDESONIDE AND FORMOTEROL FUMARATE DIHYDRATE 2 PUFF(S): 160; 4.5 AEROSOL RESPIRATORY (INHALATION) at 12:18

## 2023-02-06 RX ADMIN — HEPARIN SODIUM 1300 UNIT(S)/HR: 5000 INJECTION INTRAVENOUS; SUBCUTANEOUS at 20:22

## 2023-02-06 RX ADMIN — Medication 1: at 12:19

## 2023-02-06 RX ADMIN — Medication 50 MILLIGRAM(S): at 21:23

## 2023-02-06 RX ADMIN — Medication 5 MILLIGRAM(S): at 06:37

## 2023-02-06 RX ADMIN — Medication 30 MILLILITER(S): at 13:50

## 2023-02-06 NOTE — PHYSICAL THERAPY INITIAL EVALUATION ADULT - PERTINENT HX OF CURRENT PROBLEM, REHAB EVAL
Per documentation, pt. presented to ED for evaluation of epistaxis, admitted due to concern of acute drop in hemoglobin and subtherapeutic INR.

## 2023-02-06 NOTE — PROGRESS NOTE ADULT - PROBLEM SELECTOR PLAN 1
Currently epistaxis resolved. Subtherapeutic INR  - now improved   -Likely from dry air and constant use of NC  -Will resume anticoagulation due to high stroke risk from mechanical valve. Informed patient of risks and benefits of using AC. Patient agreeable and expressed understanding  -Will switch to nebulized air and saline nasal spray Q4H

## 2023-02-06 NOTE — PHYSICAL THERAPY INITIAL EVALUATION ADULT - ADDITIONAL COMMENTS
Pt. reports owning a rolling walker, rollator, straight cane, commode, and home O2.     Pt. was left in bed post PT Evaluation, no apparent distress, all lines intact, call black within reach. Tanesha HAYES made aware of pt. status.

## 2023-02-06 NOTE — PROGRESS NOTE ADULT - SUBJECTIVE AND OBJECTIVE BOX
SUBJECTIVE / OVERNIGHT EVENTS:pt seen and examined  02-06-23    MEDICATIONS  (STANDING):  aMIOdarone    Tablet 200 milliGRAM(s) Oral daily  budesonide 160 MICROgram(s)/formoterol 4.5 MICROgram(s) Inhaler 2 Puff(s) Inhalation two times a day  buMETAnide 2 milliGRAM(s) Oral daily  dextrose 5%. 1000 milliLiter(s) (50 mL/Hr) IV Continuous <Continuous>  dextrose 5%. 1000 milliLiter(s) (100 mL/Hr) IV Continuous <Continuous>  dextrose 50% Injectable 25 Gram(s) IV Push once  dextrose 50% Injectable 12.5 Gram(s) IV Push once  dextrose 50% Injectable 25 Gram(s) IV Push once  ferrous    sulfate 325 milliGRAM(s) Oral daily  glucagon  Injectable 1 milliGRAM(s) IntraMuscular once  heparin  Infusion.  Unit(s)/Hr (19 mL/Hr) IV Continuous <Continuous>  hydrALAZINE 50 milliGRAM(s) Oral three times a day  insulin detemir injectable (LEVEMIR) 25 Unit(s) SubCutaneous at bedtime  insulin lispro (ADMELOG) corrective regimen sliding scale   SubCutaneous three times a day before meals  insulin lispro (ADMELOG) corrective regimen sliding scale   SubCutaneous at bedtime  insulin lispro Injectable (ADMELOG) 5 Unit(s) SubCutaneous three times a day before meals  isosorbide   mononitrate ER Tablet (IMDUR) 30 milliGRAM(s) Oral daily  pantoprazole    Tablet 40 milliGRAM(s) Oral before breakfast  predniSONE   Tablet 5 milliGRAM(s) Oral daily  simvastatin 10 milliGRAM(s) Oral at bedtime  sodium chloride 0.65% Nasal 1 Spray(s) Both Nostrils every 4 hours  warfarin 5 milliGRAM(s) Oral once    MEDICATIONS  (PRN):  acetaminophen     Tablet .. 650 milliGRAM(s) Oral every 6 hours PRN Temp greater or equal to 38C (100.4F), Mild Pain (1 - 3)  albuterol    90 MICROgram(s) HFA Inhaler 2 Puff(s) Inhalation every 6 hours PRN Bronchospasm  aluminum hydroxide/magnesium hydroxide/simethicone Suspension 30 milliLiter(s) Oral every 4 hours PRN Dyspepsia  dextrose Oral Gel 15 Gram(s) Oral once PRN Blood Glucose LESS THAN 70 milliGRAM(s)/deciliter  heparin   Injectable 9000 Unit(s) IV Push every 6 hours PRN For aPTT less than 40  heparin   Injectable 4000 Unit(s) IV Push every 6 hours PRN For aPTT between 40 - 57  melatonin 3 milliGRAM(s) Oral at bedtime PRN Insomnia  ondansetron Injectable 4 milliGRAM(s) IV Push every 8 hours PRN Nausea and/or Vomiting    Vital Signs Last 24 Hrs  T(C): 36.5 (02-06-23 @ 05:35), Max: 36.6 (02-05-23 @ 21:25)  T(F): 97.7 (02-06-23 @ 05:35), Max: 97.8 (02-05-23 @ 21:25)  HR: 67 (02-06-23 @ 05:35) (62 - 67)  BP: 157/61 (02-06-23 @ 13:05) (123/51 - 157/61)  BP(mean): --  RR: 18 (02-06-23 @ 13:05) (18 - 19)  SpO2: 99% (02-06-23 @ 13:05) (99% - 100%)        Constitutional: No fever, fatigue  Skin: No rash.  Eyes: No recent vision problems or eye pain.  ENT: No congestion, ear pain, or sore throat.  Cardiovascular: No chest pain or palpation.  Respiratory: No cough, shortness of breath, congestion, or wheezing.  Gastrointestinal: No abdominal pain, nausea, vomiting, or diarrhea.  Genitourinary: No dysuria.  Musculoskeletal: No joint swelling.  Neurologic: No headache.    PHYSICAL EXAM:  GENERAL: NAD  EYES: EOMI, PERRLA  NECK: Supple, No JVD  CHEST/LUNG: dec breath sounds at bases  HEART:  S1 , S2 +  ABDOMEN: soft, bs+  EXTREMITIES:  edema+  NEUROLOGY:alert awake oriented    LABS:  02-06    140  |  97<L>  |  78<H>  ----------------------------<  110<H>  4.9   |  37<H>  |  1.94<H>    Ca    10.2      06 Feb 2023 07:04  Phos  3.5     02-06  Mg     2.30     02-06    TPro  7.1  /  Alb  3.8  /  TBili  0.3  /  DBili      /  AST  19  /  ALT  23  /  AlkPhos  98  02-04    Creatinine Trend: 1.94 <--, 2.08 <--                        8.8    5.57  )-----------( 215      ( 06 Feb 2023 07:04 )             31.1     Urine Studies:            LIVER FUNCTIONS - ( 04 Feb 2023 19:27 )  Alb: 3.8 g/dL / Pro: 7.1 g/dL / ALK PHOS: 98 U/L / ALT: 23 U/L / AST: 19 U/L / GGT: x           PT/INR - ( 06 Feb 2023 07:04 )   PT: 12.9 sec;   INR: 1.11 ratio         PTT - ( 06 Feb 2023 09:15 )  PTT:64.7 sec    Imaging Personally Reviewed:yes    Consultant(s) Notes Reviewed:  yes    Care Discussed with Consultants/Other Providers:yes

## 2023-02-06 NOTE — PROGRESS NOTE ADULT - SUBJECTIVE AND OBJECTIVE BOX
PULMONARY PROGRESS NOTE    DAMARI GUERRERO  MRN-[id number]    Patient is a 72y old  Female who presents with a chief complaint of Epistaxis (06 Feb 2023 10:46)      HPI:  -  -    ROS:   -    ACTIVE MEDICATION LIST:  MEDICATIONS  (STANDING):  aMIOdarone    Tablet 200 milliGRAM(s) Oral daily  budesonide 160 MICROgram(s)/formoterol 4.5 MICROgram(s) Inhaler 2 Puff(s) Inhalation two times a day  buMETAnide 2 milliGRAM(s) Oral daily  dextrose 5%. 1000 milliLiter(s) (50 mL/Hr) IV Continuous <Continuous>  dextrose 5%. 1000 milliLiter(s) (100 mL/Hr) IV Continuous <Continuous>  dextrose 50% Injectable 25 Gram(s) IV Push once  dextrose 50% Injectable 12.5 Gram(s) IV Push once  dextrose 50% Injectable 25 Gram(s) IV Push once  ferrous    sulfate 325 milliGRAM(s) Oral daily  glucagon  Injectable 1 milliGRAM(s) IntraMuscular once  heparin  Infusion.  Unit(s)/Hr (19 mL/Hr) IV Continuous <Continuous>  hydrALAZINE 50 milliGRAM(s) Oral three times a day  insulin detemir injectable (LEVEMIR) 25 Unit(s) SubCutaneous at bedtime  insulin lispro (ADMELOG) corrective regimen sliding scale   SubCutaneous three times a day before meals  insulin lispro (ADMELOG) corrective regimen sliding scale   SubCutaneous at bedtime  insulin lispro Injectable (ADMELOG) 5 Unit(s) SubCutaneous three times a day before meals  isosorbide   mononitrate ER Tablet (IMDUR) 30 milliGRAM(s) Oral daily  pantoprazole    Tablet 40 milliGRAM(s) Oral before breakfast  predniSONE   Tablet 5 milliGRAM(s) Oral daily  simvastatin 10 milliGRAM(s) Oral at bedtime  sodium chloride 0.65% Nasal 1 Spray(s) Both Nostrils every 4 hours  warfarin 5 milliGRAM(s) Oral once    MEDICATIONS  (PRN):  acetaminophen     Tablet .. 650 milliGRAM(s) Oral every 6 hours PRN Temp greater or equal to 38C (100.4F), Mild Pain (1 - 3)  albuterol    90 MICROgram(s) HFA Inhaler 2 Puff(s) Inhalation every 6 hours PRN Bronchospasm  aluminum hydroxide/magnesium hydroxide/simethicone Suspension 30 milliLiter(s) Oral every 4 hours PRN Dyspepsia  dextrose Oral Gel 15 Gram(s) Oral once PRN Blood Glucose LESS THAN 70 milliGRAM(s)/deciliter  heparin   Injectable 9000 Unit(s) IV Push every 6 hours PRN For aPTT less than 40  heparin   Injectable 4000 Unit(s) IV Push every 6 hours PRN For aPTT between 40 - 57  melatonin 3 milliGRAM(s) Oral at bedtime PRN Insomnia  ondansetron Injectable 4 milliGRAM(s) IV Push every 8 hours PRN Nausea and/or Vomiting      EXAM:  Vital Signs Last 24 Hrs  T(C): 36.9 (06 Feb 2023 13:55), Max: 36.9 (06 Feb 2023 13:55)  T(F): 98.4 (06 Feb 2023 13:55), Max: 98.4 (06 Feb 2023 13:55)  HR: 67 (06 Feb 2023 05:35) (62 - 67)  BP: 140/60 (06 Feb 2023 13:55) (123/51 - 157/61)  BP(mean): --  RR: 18 (06 Feb 2023 13:55) (18 - 19)  SpO2: 98% (06 Feb 2023 13:55) (98% - 100%)    Parameters below as of 06 Feb 2023 13:55  Patient On (Oxygen Delivery Method): nasal cannula w/ humidification  O2 Flow (L/min): 3      GENERAL: The patient is awake and alert in no apparent distress.     LUNGS: Clear to auscultation without wheezing, rales or rhonchi; respirations unlabored    HEART: Regular rate and rhythm without murmur.                            8.8    5.57  )-----------( 215      ( 06 Feb 2023 07:04 )             31.1       02-06    140  |  97<L>  |  78<H>  ----------------------------<  110<H>  4.9   |  37<H>  |  1.94<H>    Ca    10.2      06 Feb 2023 07:04  Phos  3.5     02-06  Mg     2.30     02-06    TPro  7.1  /  Alb  3.8  /  TBili  0.3  /  DBili  x   /  AST  19  /  ALT  23  /  AlkPhos  98  02-04    >>> <<<    PROBLEM LIST:  72y Female with HEALTH ISSUES - PROBLEM Dx:  Epistaxis    Chronic atrial fibrillation    COPD, severe    CHF (congestive heart failure)    History of mitral valve replacement with mechanical valve    Hypertension    Need for prophylactic measure    Type 2 diabetes mellitus              RECS:        Please call with any questions.    Irma Eaton DO  [facility] Pulmonary/Sleep Medicine  [phone]   PULMONARY PROGRESS NOTE    DAMARI GUERRERO  MRN-[id number]    Patient is a 72y old  Female who presents with a chief complaint of Epistaxis (06 Feb 2023 10:46)      HPI:  -3 y/o female with PMHx of Gout, COPD, HTN, DM, CHF, CKD, Afib, Pulm HTN, Mitral and Tricuspid Valve replacement (mechanical valve on warfarin), on 3L home O2 presents to ED for evaluation of epistaxis    seen on 3L home 02.  no machine at bedside  no more epistaxis  has abdominal pain   -    ROS:   -    ACTIVE MEDICATION LIST:  MEDICATIONS  (STANDING):  aMIOdarone    Tablet 200 milliGRAM(s) Oral daily  budesonide 160 MICROgram(s)/formoterol 4.5 MICROgram(s) Inhaler 2 Puff(s) Inhalation two times a day  buMETAnide 2 milliGRAM(s) Oral daily  dextrose 5%. 1000 milliLiter(s) (50 mL/Hr) IV Continuous <Continuous>  dextrose 5%. 1000 milliLiter(s) (100 mL/Hr) IV Continuous <Continuous>  dextrose 50% Injectable 25 Gram(s) IV Push once  dextrose 50% Injectable 12.5 Gram(s) IV Push once  dextrose 50% Injectable 25 Gram(s) IV Push once  ferrous    sulfate 325 milliGRAM(s) Oral daily  glucagon  Injectable 1 milliGRAM(s) IntraMuscular once  heparin  Infusion.  Unit(s)/Hr (19 mL/Hr) IV Continuous <Continuous>  hydrALAZINE 50 milliGRAM(s) Oral three times a day  insulin detemir injectable (LEVEMIR) 25 Unit(s) SubCutaneous at bedtime  insulin lispro (ADMELOG) corrective regimen sliding scale   SubCutaneous three times a day before meals  insulin lispro (ADMELOG) corrective regimen sliding scale   SubCutaneous at bedtime  insulin lispro Injectable (ADMELOG) 5 Unit(s) SubCutaneous three times a day before meals  isosorbide   mononitrate ER Tablet (IMDUR) 30 milliGRAM(s) Oral daily  pantoprazole    Tablet 40 milliGRAM(s) Oral before breakfast  predniSONE   Tablet 5 milliGRAM(s) Oral daily  simvastatin 10 milliGRAM(s) Oral at bedtime  sodium chloride 0.65% Nasal 1 Spray(s) Both Nostrils every 4 hours  warfarin 5 milliGRAM(s) Oral once    MEDICATIONS  (PRN):  acetaminophen     Tablet .. 650 milliGRAM(s) Oral every 6 hours PRN Temp greater or equal to 38C (100.4F), Mild Pain (1 - 3)  albuterol    90 MICROgram(s) HFA Inhaler 2 Puff(s) Inhalation every 6 hours PRN Bronchospasm  aluminum hydroxide/magnesium hydroxide/simethicone Suspension 30 milliLiter(s) Oral every 4 hours PRN Dyspepsia  dextrose Oral Gel 15 Gram(s) Oral once PRN Blood Glucose LESS THAN 70 milliGRAM(s)/deciliter  heparin   Injectable 9000 Unit(s) IV Push every 6 hours PRN For aPTT less than 40  heparin   Injectable 4000 Unit(s) IV Push every 6 hours PRN For aPTT between 40 - 57  melatonin 3 milliGRAM(s) Oral at bedtime PRN Insomnia  ondansetron Injectable 4 milliGRAM(s) IV Push every 8 hours PRN Nausea and/or Vomiting      EXAM:  Vital Signs Last 24 Hrs  T(C): 36.9 (06 Feb 2023 13:55), Max: 36.9 (06 Feb 2023 13:55)  T(F): 98.4 (06 Feb 2023 13:55), Max: 98.4 (06 Feb 2023 13:55)  HR: 67 (06 Feb 2023 05:35) (62 - 67)  BP: 140/60 (06 Feb 2023 13:55) (123/51 - 157/61)  BP(mean): --  RR: 18 (06 Feb 2023 13:55) (18 - 19)  SpO2: 98% (06 Feb 2023 13:55) (98% - 100%)    Parameters below as of 06 Feb 2023 13:55  Patient On (Oxygen Delivery Method): nasal cannula w/ humidification  O2 Flow (L/min): 3      GENERAL: The patient is awake and alert in no apparent distress.     LUNGS: Clear to auscultation without wheezing, rales or rhonchi; respirations unlabored                           8.8    5.57  )-----------( 215      ( 06 Feb 2023 07:04 )             31.1       02-06    140  |  97<L>  |  78<H>  ----------------------------<  110<H>  4.9   |  37<H>  |  1.94<H>    Ca    10.2      06 Feb 2023 07:04  Phos  3.5     02-06  Mg     2.30     02-06    TPro  7.1  /  Alb  3.8  /  TBili  0.3  /  DBili  x   /  AST  19  /  ALT  23  /  AlkPhos  98  02-04     < from: Xray Chest 1 View AP/PA (02.04.23 @ 20:03) >    ACC: [id number] EXAM:  XR CHEST AP OR PA 1V   ORDERED BY: ILIR LEMOS     PROCEDURE DATE:  02/04/2023          INTERPRETATION:  EXAMINATION: XR CHEST    CLINICAL INDICATION: Shortness of breath    TECHNIQUE: Single frontal portable view of the chest from 2/4/2023 8:03 PM    COMPARISON: 1/17/2023    FINDINGS:  Status post sternotomy and mitral and tricuspid valve repairs.    The heart size is not accurately assessed on this projection.  Diffuse bilateral patchy opacities.  There is no pneumothoraxor pleural effusion.    IMPRESSION:  Diffuse bilateral patchy opacities, unchanged from prior.    --- End of Report ---          < end of copied text >  < from: CT Chest No Cont (01.19.23 @ 14:11) >    ACC: [id number] EXAM:  CT CHEST   ORDERED BY: ALEX DURAN     PROCEDURE DATE:  01/19/2023          INTERPRETATION:  CLINICAL INFORMATION: Short of breath. Evaluate for   pulmonary edema.    COMPARISON: CT chest 1/8/2022. CT chest 3/12/2009.    CONTRAST/COMPLICATIONS:  IV Contrast: NONE  Oral Contrast: NONE  Complications: None reported at time of study completion    PROCEDURE:  CT scan of the chest was obtained without intravenous contrast.    FINDINGS:    LYMPH NODES: Calcified mediastinallymph nodes.    HEART/VASCULATURE: Cardiomegaly. No pericardial effusion. Status post   mitral and tricuspid valve repair. Retained epicardial pacing wires.    AIRWAYS/LUNGS/PLEURA: Patent central airways. Left upper lobe linear   atelectasis versus scarring. Previously seen right upper lobe 3 mm nodule   has resolved. No pleural effusion.    UPPER ABDOMEN: Redemonstrated left upper pole renal mass measuring 3.7   cm, slightly increased from 1/8/2022. Cirrhotic morphology of the liver.    BONES/SOFT TISSUES: Status post median sternotomy.    IMPRESSION:    Since CT chest 1/8/2022:    No pulmonary edema.    Left upper pole renal mass demonstrating slight Interval increase since   previous exam of January 2022.            --- End of Report ---          SHERRI SOLANO MD; Resident Radiologist  This document has been electronically signed.  ERNESTINE ACOSTA MD; Attending Radiologist  This document has been electronically signed. Jan 19 2023  4:10PM    < end of copied text >      PROBLEM LIST:  72y Female with HEALTH ISSUES - PROBLEM Dx:  Epistaxis    Chronic atrial fibrillation    COPD, severe    CHF (congestive heart failure)    History of mitral valve replacement with mechanical valve    Hypertension    Need for prophylactic measure    Type 2 diabetes mellitus              RECS:  resp status ok  bronchodilators  prednisone 5  02  bpap 14/7 qhs        Please call with any questions.    Irma Eaton DO  [facility] Pulmonary/Sleep Medicine  [phone]

## 2023-02-07 LAB
ANION GAP SERPL CALC-SCNC: 8 MMOL/L — SIGNIFICANT CHANGE UP (ref 7–14)
APTT BLD: 81 SEC — HIGH (ref 27–36.3)
BUN SERPL-MCNC: 79 MG/DL — HIGH (ref 7–23)
CALCIUM SERPL-MCNC: 10.1 MG/DL — SIGNIFICANT CHANGE UP (ref 8.4–10.5)
CHLORIDE SERPL-SCNC: 95 MMOL/L — LOW (ref 98–107)
CO2 SERPL-SCNC: 37 MMOL/L — HIGH (ref 22–31)
CREAT SERPL-MCNC: 2.18 MG/DL — HIGH (ref 0.5–1.3)
EGFR: 23 ML/MIN/1.73M2 — LOW
GLUCOSE BLDC GLUCOMTR-MCNC: 120 MG/DL — HIGH (ref 70–99)
GLUCOSE BLDC GLUCOMTR-MCNC: 141 MG/DL — HIGH (ref 70–99)
GLUCOSE BLDC GLUCOMTR-MCNC: 158 MG/DL — HIGH (ref 70–99)
GLUCOSE BLDC GLUCOMTR-MCNC: 167 MG/DL — HIGH (ref 70–99)
GLUCOSE BLDC GLUCOMTR-MCNC: 195 MG/DL — HIGH (ref 70–99)
GLUCOSE SERPL-MCNC: 124 MG/DL — HIGH (ref 70–99)
HCT VFR BLD CALC: 30.1 % — LOW (ref 34.5–45)
HGB BLD-MCNC: 8.5 G/DL — LOW (ref 11.5–15.5)
INR BLD: 1.19 RATIO — HIGH (ref 0.88–1.16)
MAGNESIUM SERPL-MCNC: 2.1 MG/DL — SIGNIFICANT CHANGE UP (ref 1.6–2.6)
MCHC RBC-ENTMCNC: 28.2 GM/DL — LOW (ref 32–36)
MCHC RBC-ENTMCNC: 29.7 PG — SIGNIFICANT CHANGE UP (ref 27–34)
MCV RBC AUTO: 105.2 FL — HIGH (ref 80–100)
NRBC # BLD: 0 /100 WBCS — SIGNIFICANT CHANGE UP (ref 0–0)
NRBC # FLD: 0 K/UL — SIGNIFICANT CHANGE UP (ref 0–0)
PHOSPHATE SERPL-MCNC: 3 MG/DL — SIGNIFICANT CHANGE UP (ref 2.5–4.5)
PLATELET # BLD AUTO: 218 K/UL — SIGNIFICANT CHANGE UP (ref 150–400)
POTASSIUM SERPL-MCNC: 5.2 MMOL/L — SIGNIFICANT CHANGE UP (ref 3.5–5.3)
POTASSIUM SERPL-SCNC: 5.2 MMOL/L — SIGNIFICANT CHANGE UP (ref 3.5–5.3)
PROTHROM AB SERPL-ACNC: 13.8 SEC — HIGH (ref 10.5–13.4)
RBC # BLD: 2.86 M/UL — LOW (ref 3.8–5.2)
RBC # FLD: 14.4 % — SIGNIFICANT CHANGE UP (ref 10.3–14.5)
SODIUM SERPL-SCNC: 140 MMOL/L — SIGNIFICANT CHANGE UP (ref 135–145)
WBC # BLD: 4.22 K/UL — SIGNIFICANT CHANGE UP (ref 3.8–10.5)
WBC # FLD AUTO: 4.22 K/UL — SIGNIFICANT CHANGE UP (ref 3.8–10.5)

## 2023-02-07 RX ORDER — WARFARIN SODIUM 2.5 MG/1
7.5 TABLET ORAL ONCE
Refills: 0 | Status: COMPLETED | OUTPATIENT
Start: 2023-02-07 | End: 2023-02-07

## 2023-02-07 RX ADMIN — Medication 25 UNIT(S): at 22:09

## 2023-02-07 RX ADMIN — Medication 1 SPRAY(S): at 17:52

## 2023-02-07 RX ADMIN — Medication 1 SPRAY(S): at 02:58

## 2023-02-07 RX ADMIN — Medication 50 MILLIGRAM(S): at 21:05

## 2023-02-07 RX ADMIN — HEPARIN SODIUM 1300 UNIT(S)/HR: 5000 INJECTION INTRAVENOUS; SUBCUTANEOUS at 08:28

## 2023-02-07 RX ADMIN — SIMVASTATIN 10 MILLIGRAM(S): 20 TABLET, FILM COATED ORAL at 21:05

## 2023-02-07 RX ADMIN — Medication 1 SPRAY(S): at 05:06

## 2023-02-07 RX ADMIN — Medication 1: at 12:14

## 2023-02-07 RX ADMIN — Medication 5 UNIT(S): at 17:40

## 2023-02-07 RX ADMIN — WARFARIN SODIUM 7.5 MILLIGRAM(S): 2.5 TABLET ORAL at 21:05

## 2023-02-07 RX ADMIN — Medication 1 SPRAY(S): at 11:13

## 2023-02-07 RX ADMIN — PANTOPRAZOLE SODIUM 40 MILLIGRAM(S): 20 TABLET, DELAYED RELEASE ORAL at 05:05

## 2023-02-07 RX ADMIN — Medication 1: at 08:46

## 2023-02-07 RX ADMIN — Medication 5 UNIT(S): at 08:46

## 2023-02-07 RX ADMIN — ISOSORBIDE MONONITRATE 30 MILLIGRAM(S): 60 TABLET, EXTENDED RELEASE ORAL at 12:15

## 2023-02-07 RX ADMIN — Medication 50 MILLIGRAM(S): at 13:38

## 2023-02-07 RX ADMIN — Medication 5 UNIT(S): at 12:14

## 2023-02-07 RX ADMIN — HEPARIN SODIUM 1300 UNIT(S)/HR: 5000 INJECTION INTRAVENOUS; SUBCUTANEOUS at 20:16

## 2023-02-07 RX ADMIN — BUDESONIDE AND FORMOTEROL FUMARATE DIHYDRATE 2 PUFF(S): 160; 4.5 AEROSOL RESPIRATORY (INHALATION) at 08:47

## 2023-02-07 RX ADMIN — BUDESONIDE AND FORMOTEROL FUMARATE DIHYDRATE 2 PUFF(S): 160; 4.5 AEROSOL RESPIRATORY (INHALATION) at 21:05

## 2023-02-07 RX ADMIN — Medication 1 SPRAY(S): at 13:38

## 2023-02-07 RX ADMIN — Medication 5 MILLIGRAM(S): at 05:05

## 2023-02-07 RX ADMIN — Medication 1 SPRAY(S): at 21:05

## 2023-02-07 RX ADMIN — Medication 50 MILLIGRAM(S): at 05:05

## 2023-02-07 RX ADMIN — BUMETANIDE 2 MILLIGRAM(S): 0.25 INJECTION INTRAMUSCULAR; INTRAVENOUS at 05:05

## 2023-02-07 RX ADMIN — AMIODARONE HYDROCHLORIDE 200 MILLIGRAM(S): 400 TABLET ORAL at 05:05

## 2023-02-07 RX ADMIN — HEPARIN SODIUM 1300 UNIT(S)/HR: 5000 INJECTION INTRAVENOUS; SUBCUTANEOUS at 20:18

## 2023-02-07 RX ADMIN — Medication 325 MILLIGRAM(S): at 12:15

## 2023-02-07 NOTE — PROGRESS NOTE ADULT - SUBJECTIVE AND OBJECTIVE BOX
SUBJECTIVE / OVERNIGHT EVENTS:pt seen and examined  02-07-23    MEDICATIONS  (STANDING):  aMIOdarone    Tablet 200 milliGRAM(s) Oral daily  budesonide 160 MICROgram(s)/formoterol 4.5 MICROgram(s) Inhaler 2 Puff(s) Inhalation two times a day  buMETAnide 2 milliGRAM(s) Oral daily  dextrose 5%. 1000 milliLiter(s) (50 mL/Hr) IV Continuous <Continuous>  dextrose 5%. 1000 milliLiter(s) (100 mL/Hr) IV Continuous <Continuous>  dextrose 50% Injectable 25 Gram(s) IV Push once  dextrose 50% Injectable 12.5 Gram(s) IV Push once  dextrose 50% Injectable 25 Gram(s) IV Push once  ferrous    sulfate 325 milliGRAM(s) Oral daily  glucagon  Injectable 1 milliGRAM(s) IntraMuscular once  heparin  Infusion.  Unit(s)/Hr (19 mL/Hr) IV Continuous <Continuous>  hydrALAZINE 50 milliGRAM(s) Oral three times a day  insulin detemir injectable (LEVEMIR) 25 Unit(s) SubCutaneous at bedtime  insulin lispro (ADMELOG) corrective regimen sliding scale   SubCutaneous three times a day before meals  insulin lispro (ADMELOG) corrective regimen sliding scale   SubCutaneous at bedtime  insulin lispro Injectable (ADMELOG) 5 Unit(s) SubCutaneous three times a day before meals  isosorbide   mononitrate ER Tablet (IMDUR) 30 milliGRAM(s) Oral daily  pantoprazole    Tablet 40 milliGRAM(s) Oral before breakfast  predniSONE   Tablet 5 milliGRAM(s) Oral daily  simvastatin 10 milliGRAM(s) Oral at bedtime  sodium chloride 0.65% Nasal 1 Spray(s) Both Nostrils every 4 hours    MEDICATIONS  (PRN):  acetaminophen     Tablet .. 650 milliGRAM(s) Oral every 6 hours PRN Temp greater or equal to 38C (100.4F), Mild Pain (1 - 3)  albuterol    90 MICROgram(s) HFA Inhaler 2 Puff(s) Inhalation every 6 hours PRN Bronchospasm  aluminum hydroxide/magnesium hydroxide/simethicone Suspension 30 milliLiter(s) Oral every 4 hours PRN Dyspepsia  dextrose Oral Gel 15 Gram(s) Oral once PRN Blood Glucose LESS THAN 70 milliGRAM(s)/deciliter  heparin   Injectable 9000 Unit(s) IV Push every 6 hours PRN For aPTT less than 40  heparin   Injectable 4000 Unit(s) IV Push every 6 hours PRN For aPTT between 40 - 57  melatonin 3 milliGRAM(s) Oral at bedtime PRN Insomnia  ondansetron Injectable 4 milliGRAM(s) IV Push every 8 hours PRN Nausea and/or Vomiting    Vital Signs Last 24 Hrs  T(C): 36.4 (02-07-23 @ 21:00), Max: 36.8 (02-07-23 @ 12:10)  T(F): 97.6 (02-07-23 @ 21:00), Max: 98.2 (02-07-23 @ 12:10)  HR: 90 (02-07-23 @ 23:10) (62 - 120)  BP: 135/58 (02-07-23 @ 21:00) (128/56 - 136/61)  BP(mean): --  RR: 18 (02-07-23 @ 21:00) (18 - 19)  SpO2: 100% (02-07-23 @ 23:10) (96% - 100%)        Constitutional: No fever, fatigue  Skin: No rash.  Eyes: No recent vision problems or eye pain.  ENT: No congestion, ear pain, or sore throat.  Cardiovascular: No chest pain or palpation.  Respiratory: No cough, shortness of breath, congestion, or wheezing.  Gastrointestinal: No abdominal pain, nausea, vomiting, or diarrhea.  Genitourinary: No dysuria.  Musculoskeletal: No joint swelling.  Neurologic: No headache.    PHYSICAL EXAM:  GENERAL: NAD  EYES: EOMI, PERRLA  NECK: Supple, No JVD  CHEST/LUNG: dec breath sounds at bases  HEART:  S1 , S2 +  ABDOMEN: soft, bs+  EXTREMITIES:  edema+  NEUROLOGY:alert awake oriented    LABS:  02-07    140  |  95<L>  |  79<H>  ----------------------------<  124<H>  5.2   |  37<H>  |  2.18<H>    Ca    10.1      07 Feb 2023 06:31  Phos  3.0     02-07  Mg     2.10     02-07      Creatinine Trend: 2.18 <--, 1.94 <--, 2.08 <--                        8.5    4.22  )-----------( 218      ( 07 Feb 2023 06:31 )             30.1     Urine Studies:              PT/INR - ( 07 Feb 2023 06:31 )   PT: 13.8 sec;   INR: 1.19 ratio         PTT - ( 07 Feb 2023 06:31 )  PTT:81.0 sec    LIVER FUNCTIONS - ( 04 Feb 2023 19:27 )  Alb: 3.8 g/dL / Pro: 7.1 g/dL / ALK PHOS: 98 U/L / ALT: 23 U/L / AST: 19 U/L / GGT: x           PT/INR - ( 06 Feb 2023 07:04 )   PT: 12.9 sec;   INR: 1.11 ratio         PTT - ( 06 Feb 2023 09:15 )  PTT:64.7 sec    Imaging Personally Reviewed:yes    Consultant(s) Notes Reviewed:  yes    Care Discussed with Consultants/Other Providers:yes

## 2023-02-08 LAB
ANION GAP SERPL CALC-SCNC: 9 MMOL/L — SIGNIFICANT CHANGE UP (ref 7–14)
APTT BLD: 129.8 SEC — SIGNIFICANT CHANGE UP (ref 27–36.3)
APTT BLD: 78.2 SEC — HIGH (ref 27–36.3)
APTT BLD: 83.4 SEC — HIGH (ref 27–36.3)
BASOPHILS # BLD AUTO: 0.01 K/UL — SIGNIFICANT CHANGE UP (ref 0–0.2)
BASOPHILS NFR BLD AUTO: 0.2 % — SIGNIFICANT CHANGE UP (ref 0–2)
BUN SERPL-MCNC: 81 MG/DL — HIGH (ref 7–23)
CALCIUM SERPL-MCNC: 10.3 MG/DL — SIGNIFICANT CHANGE UP (ref 8.4–10.5)
CHLORIDE SERPL-SCNC: 94 MMOL/L — LOW (ref 98–107)
CO2 SERPL-SCNC: 34 MMOL/L — HIGH (ref 22–31)
CREAT SERPL-MCNC: 2.28 MG/DL — HIGH (ref 0.5–1.3)
EGFR: 22 ML/MIN/1.73M2 — LOW
EOSINOPHIL # BLD AUTO: 0.07 K/UL — SIGNIFICANT CHANGE UP (ref 0–0.5)
EOSINOPHIL NFR BLD AUTO: 1.4 % — SIGNIFICANT CHANGE UP (ref 0–6)
GLUCOSE BLDC GLUCOMTR-MCNC: 106 MG/DL — HIGH (ref 70–99)
GLUCOSE BLDC GLUCOMTR-MCNC: 110 MG/DL — HIGH (ref 70–99)
GLUCOSE BLDC GLUCOMTR-MCNC: 142 MG/DL — HIGH (ref 70–99)
GLUCOSE BLDC GLUCOMTR-MCNC: 161 MG/DL — HIGH (ref 70–99)
GLUCOSE BLDC GLUCOMTR-MCNC: 280 MG/DL — HIGH (ref 70–99)
GLUCOSE SERPL-MCNC: 113 MG/DL — HIGH (ref 70–99)
HCT VFR BLD CALC: 29.2 % — LOW (ref 34.5–45)
HGB BLD-MCNC: 8.6 G/DL — LOW (ref 11.5–15.5)
IANC: 3.16 K/UL — SIGNIFICANT CHANGE UP (ref 1.8–7.4)
IMM GRANULOCYTES NFR BLD AUTO: 0.8 % — SIGNIFICANT CHANGE UP (ref 0–0.9)
INR BLD: 1.3 RATIO — HIGH (ref 0.88–1.16)
LYMPHOCYTES # BLD AUTO: 0.99 K/UL — LOW (ref 1–3.3)
LYMPHOCYTES # BLD AUTO: 20.4 % — SIGNIFICANT CHANGE UP (ref 13–44)
MAGNESIUM SERPL-MCNC: 2.2 MG/DL — SIGNIFICANT CHANGE UP (ref 1.6–2.6)
MCHC RBC-ENTMCNC: 29.5 GM/DL — LOW (ref 32–36)
MCHC RBC-ENTMCNC: 30.4 PG — SIGNIFICANT CHANGE UP (ref 27–34)
MCV RBC AUTO: 103.2 FL — HIGH (ref 80–100)
MONOCYTES # BLD AUTO: 0.59 K/UL — SIGNIFICANT CHANGE UP (ref 0–0.9)
MONOCYTES NFR BLD AUTO: 12.1 % — SIGNIFICANT CHANGE UP (ref 2–14)
NEUTROPHILS # BLD AUTO: 3.16 K/UL — SIGNIFICANT CHANGE UP (ref 1.8–7.4)
NEUTROPHILS NFR BLD AUTO: 65.1 % — SIGNIFICANT CHANGE UP (ref 43–77)
NRBC # BLD: 0 /100 WBCS — SIGNIFICANT CHANGE UP (ref 0–0)
NRBC # FLD: 0 K/UL — SIGNIFICANT CHANGE UP (ref 0–0)
PHOSPHATE SERPL-MCNC: 3 MG/DL — SIGNIFICANT CHANGE UP (ref 2.5–4.5)
PLATELET # BLD AUTO: 223 K/UL — SIGNIFICANT CHANGE UP (ref 150–400)
POTASSIUM SERPL-MCNC: 5 MMOL/L — SIGNIFICANT CHANGE UP (ref 3.5–5.3)
POTASSIUM SERPL-SCNC: 5 MMOL/L — SIGNIFICANT CHANGE UP (ref 3.5–5.3)
PROTHROM AB SERPL-ACNC: 15.1 SEC — HIGH (ref 10.5–13.4)
RBC # BLD: 2.83 M/UL — LOW (ref 3.8–5.2)
RBC # FLD: 14.7 % — HIGH (ref 10.3–14.5)
SODIUM SERPL-SCNC: 137 MMOL/L — SIGNIFICANT CHANGE UP (ref 135–145)
WBC # BLD: 4.86 K/UL — SIGNIFICANT CHANGE UP (ref 3.8–10.5)
WBC # FLD AUTO: 4.86 K/UL — SIGNIFICANT CHANGE UP (ref 3.8–10.5)

## 2023-02-08 RX ORDER — LIDOCAINE 4 G/100G
1 CREAM TOPICAL DAILY
Refills: 0 | Status: DISCONTINUED | OUTPATIENT
Start: 2023-02-08 | End: 2023-03-06

## 2023-02-08 RX ORDER — OXYMETAZOLINE HYDROCHLORIDE 0.5 MG/ML
2 SPRAY NASAL EVERY 12 HOURS
Refills: 0 | Status: DISCONTINUED | OUTPATIENT
Start: 2023-02-08 | End: 2023-02-08

## 2023-02-08 RX ORDER — OXYMETAZOLINE HYDROCHLORIDE 0.5 MG/ML
2 SPRAY NASAL EVERY 12 HOURS
Refills: 0 | Status: DISCONTINUED | OUTPATIENT
Start: 2023-02-08 | End: 2023-02-21

## 2023-02-08 RX ORDER — PETROLATUM,WHITE
1 JELLY (GRAM) TOPICAL EVERY 6 HOURS
Refills: 0 | Status: DISCONTINUED | OUTPATIENT
Start: 2023-02-08 | End: 2023-03-06

## 2023-02-08 RX ORDER — WARFARIN SODIUM 2.5 MG/1
7.5 TABLET ORAL ONCE
Refills: 0 | Status: COMPLETED | OUTPATIENT
Start: 2023-02-08 | End: 2023-02-08

## 2023-02-08 RX ADMIN — HEPARIN SODIUM 1000 UNIT(S)/HR: 5000 INJECTION INTRAVENOUS; SUBCUTANEOUS at 20:16

## 2023-02-08 RX ADMIN — Medication 1 SPRAY(S): at 13:23

## 2023-02-08 RX ADMIN — Medication 25 UNIT(S): at 22:19

## 2023-02-08 RX ADMIN — Medication 650 MILLIGRAM(S): at 13:58

## 2023-02-08 RX ADMIN — HEPARIN SODIUM 1000 UNIT(S)/HR: 5000 INJECTION INTRAVENOUS; SUBCUTANEOUS at 23:17

## 2023-02-08 RX ADMIN — Medication 50 MILLIGRAM(S): at 13:52

## 2023-02-08 RX ADMIN — Medication 50 MILLIGRAM(S): at 21:56

## 2023-02-08 RX ADMIN — Medication 1 APPLICATION(S): at 18:33

## 2023-02-08 RX ADMIN — Medication 325 MILLIGRAM(S): at 12:57

## 2023-02-08 RX ADMIN — Medication 5 UNIT(S): at 13:22

## 2023-02-08 RX ADMIN — HEPARIN SODIUM 1000 UNIT(S)/HR: 5000 INJECTION INTRAVENOUS; SUBCUTANEOUS at 08:58

## 2023-02-08 RX ADMIN — PANTOPRAZOLE SODIUM 40 MILLIGRAM(S): 20 TABLET, DELAYED RELEASE ORAL at 05:23

## 2023-02-08 RX ADMIN — HEPARIN SODIUM 1000 UNIT(S)/HR: 5000 INJECTION INTRAVENOUS; SUBCUTANEOUS at 16:19

## 2023-02-08 RX ADMIN — BUDESONIDE AND FORMOTEROL FUMARATE DIHYDRATE 2 PUFF(S): 160; 4.5 AEROSOL RESPIRATORY (INHALATION) at 09:00

## 2023-02-08 RX ADMIN — Medication 5 MILLIGRAM(S): at 05:23

## 2023-02-08 RX ADMIN — BUDESONIDE AND FORMOTEROL FUMARATE DIHYDRATE 2 PUFF(S): 160; 4.5 AEROSOL RESPIRATORY (INHALATION) at 21:56

## 2023-02-08 RX ADMIN — HEPARIN SODIUM 1000 UNIT(S)/HR: 5000 INJECTION INTRAVENOUS; SUBCUTANEOUS at 19:02

## 2023-02-08 RX ADMIN — Medication 5 UNIT(S): at 08:59

## 2023-02-08 RX ADMIN — ISOSORBIDE MONONITRATE 30 MILLIGRAM(S): 60 TABLET, EXTENDED RELEASE ORAL at 13:01

## 2023-02-08 RX ADMIN — LIDOCAINE 1 PATCH: 4 CREAM TOPICAL at 14:15

## 2023-02-08 RX ADMIN — Medication 5 UNIT(S): at 18:33

## 2023-02-08 RX ADMIN — Medication 50 MILLIGRAM(S): at 05:24

## 2023-02-08 RX ADMIN — BUMETANIDE 2 MILLIGRAM(S): 0.25 INJECTION INTRAMUSCULAR; INTRAVENOUS at 05:23

## 2023-02-08 RX ADMIN — WARFARIN SODIUM 7.5 MILLIGRAM(S): 2.5 TABLET ORAL at 21:56

## 2023-02-08 RX ADMIN — HEPARIN SODIUM 0 UNIT(S)/HR: 5000 INJECTION INTRAVENOUS; SUBCUTANEOUS at 07:49

## 2023-02-08 RX ADMIN — Medication 1: at 22:20

## 2023-02-08 RX ADMIN — Medication 1 SPRAY(S): at 21:56

## 2023-02-08 RX ADMIN — Medication 1 SPRAY(S): at 05:24

## 2023-02-08 RX ADMIN — Medication 1 SPRAY(S): at 18:33

## 2023-02-08 RX ADMIN — SIMVASTATIN 10 MILLIGRAM(S): 20 TABLET, FILM COATED ORAL at 21:56

## 2023-02-08 RX ADMIN — LIDOCAINE 1 PATCH: 4 CREAM TOPICAL at 19:32

## 2023-02-08 RX ADMIN — Medication 1 SPRAY(S): at 09:01

## 2023-02-08 RX ADMIN — HEPARIN SODIUM 0 UNIT(S)/HR: 5000 INJECTION INTRAVENOUS; SUBCUTANEOUS at 08:10

## 2023-02-08 RX ADMIN — AMIODARONE HYDROCHLORIDE 200 MILLIGRAM(S): 400 TABLET ORAL at 05:23

## 2023-02-08 RX ADMIN — Medication 650 MILLIGRAM(S): at 12:58

## 2023-02-08 RX ADMIN — Medication 1: at 08:59

## 2023-02-08 NOTE — PROGRESS NOTE ADULT - PROBLEM SELECTOR PLAN 1
Currently epistaxis resolved. Subtherapeutic INR  - now improved   -Likely from dry air and constant use of NC  -Will resume anticoagulation due to high stroke risk from mechanical valve. Informed patient of risks and benefits of using AC. Patient agreeable and expressed understanding  -l switch to nebulized air and saline nasal spray Q4H

## 2023-02-08 NOTE — PROGRESS NOTE ADULT - SUBJECTIVE AND OBJECTIVE BOX
SUBJECTIVE / OVERNIGHT EVENTS:pt seen and examined  02-08-23    MEDICATIONS  (STANDING):  aMIOdarone    Tablet 200 milliGRAM(s) Oral daily  budesonide 160 MICROgram(s)/formoterol 4.5 MICROgram(s) Inhaler 2 Puff(s) Inhalation two times a day  buMETAnide 2 milliGRAM(s) Oral daily  dextrose 5%. 1000 milliLiter(s) (50 mL/Hr) IV Continuous <Continuous>  dextrose 5%. 1000 milliLiter(s) (100 mL/Hr) IV Continuous <Continuous>  dextrose 50% Injectable 25 Gram(s) IV Push once  dextrose 50% Injectable 12.5 Gram(s) IV Push once  dextrose 50% Injectable 25 Gram(s) IV Push once  ferrous    sulfate 325 milliGRAM(s) Oral daily  glucagon  Injectable 1 milliGRAM(s) IntraMuscular once  heparin  Infusion.  Unit(s)/Hr (19 mL/Hr) IV Continuous <Continuous>  hydrALAZINE 50 milliGRAM(s) Oral three times a day  insulin detemir injectable (LEVEMIR) 25 Unit(s) SubCutaneous at bedtime  insulin lispro (ADMELOG) corrective regimen sliding scale   SubCutaneous three times a day before meals  insulin lispro (ADMELOG) corrective regimen sliding scale   SubCutaneous at bedtime  insulin lispro Injectable (ADMELOG) 5 Unit(s) SubCutaneous three times a day before meals  isosorbide   mononitrate ER Tablet (IMDUR) 30 milliGRAM(s) Oral daily  pantoprazole    Tablet 40 milliGRAM(s) Oral before breakfast  petrolatum white Ointment 1 Application(s) Topical every 6 hours  predniSONE   Tablet 5 milliGRAM(s) Oral daily  simvastatin 10 milliGRAM(s) Oral at bedtime  sodium chloride 0.65% Nasal 1 Spray(s) Both Nostrils every 4 hours    MEDICATIONS  (PRN):  acetaminophen     Tablet .. 650 milliGRAM(s) Oral every 6 hours PRN Temp greater or equal to 38C (100.4F), Mild Pain (1 - 3)  albuterol    90 MICROgram(s) HFA Inhaler 2 Puff(s) Inhalation every 6 hours PRN Bronchospasm  aluminum hydroxide/magnesium hydroxide/simethicone Suspension 30 milliLiter(s) Oral every 4 hours PRN Dyspepsia  dextrose Oral Gel 15 Gram(s) Oral once PRN Blood Glucose LESS THAN 70 milliGRAM(s)/deciliter  heparin   Injectable 9000 Unit(s) IV Push every 6 hours PRN For aPTT less than 40  heparin   Injectable 4000 Unit(s) IV Push every 6 hours PRN For aPTT between 40 - 57  lidocaine   4% Patch 1 Patch Transdermal daily PRN pain  melatonin 3 milliGRAM(s) Oral at bedtime PRN Insomnia  ondansetron Injectable 4 milliGRAM(s) IV Push every 8 hours PRN Nausea and/or Vomiting  oxymetazoline 0.05% Nasal Spray 2 Spray(s) Both Nostrils every 12 hours PRN Nose bleeds    Vital Signs Last 24 Hrs  T(C): 36.8 (02-08-23 @ 21:29), Max: 36.9 (02-08-23 @ 13:00)  T(F): 98.2 (02-08-23 @ 21:29), Max: 98.5 (02-08-23 @ 13:00)  HR: 62 (02-08-23 @ 21:56) (59 - 92)  BP: 131/62 (02-08-23 @ 21:56) (113/62 - 148/51)  BP(mean): --  RR: 18 (02-08-23 @ 21:29) (18 - 19)  SpO2: 100% (02-08-23 @ 21:29) (98% - 100%)          Constitutional: No fever, fatigue  Skin: No rash.  Eyes: No recent vision problems or eye pain.  ENT: No congestion, ear pain, or sore throat.  Cardiovascular: No chest pain or palpation.  Respiratory: No cough, shortness of breath, congestion, or wheezing.  Gastrointestinal: No abdominal pain, nausea, vomiting, or diarrhea.  Genitourinary: No dysuria.  Musculoskeletal: No joint swelling.  Neurologic: No headache.    PHYSICAL EXAM:  GENERAL: NAD  EYES: EOMI, PERRLA  NECK: Supple, No JVD  CHEST/LUNG: dec breath sounds at bases  HEART:  S1 , S2 +  ABDOMEN: soft, bs+  EXTREMITIES:  edema+  NEUROLOGY:alert awake oriented    LABS:  02-08    137  |  94<L>  |  81<H>  ----------------------------<  113<H>  5.0   |  34<H>  |  2.28<H>    Ca    10.3      08 Feb 2023 06:06  Phos  3.0     02-08  Mg     2.20     02-08      Creatinine Trend: 2.28 <--, 2.18 <--, 1.94 <--, 2.08 <--                        8.6    4.86  )-----------( 223      ( 08 Feb 2023 06:06 )             29.2     Urine Studies:              PT/INR - ( 08 Feb 2023 06:06 )   PT: 15.1 sec;   INR: 1.30 ratio         PTT - ( 08 Feb 2023 14:36 )  PTT:83.4 sec    Imaging Personally Reviewed:yes    Consultant(s) Notes Reviewed:  yes    Care Discussed with Consultants/Other Providers:yes

## 2023-02-08 NOTE — CHART NOTE - NSCHARTNOTEFT_GEN_A_CORE
Spoke to ENT regarding recurrent epistaxis, continue with nasal saline, nasal ointment, and affrin PRN for epistaxis. Call if uncontrolled bleeding> 20 minutes or large blood loss.     Rjaeev TERESA [id number]

## 2023-02-08 NOTE — PHYSICAL THERAPY INITIAL EVALUATION ADULT - REFERRING PHYSICIAN, REHAB EVAL
Enema given, pt tolerated well and was able to retain long enough to walk into adjoining restroom. Supplies and call light given.       Greg Acosta RN  02/08/23 [id number] Dakota Mccrary. PT Evaluate & Treat.

## 2023-02-09 LAB
ANION GAP SERPL CALC-SCNC: 9 MMOL/L — SIGNIFICANT CHANGE UP (ref 7–14)
APTT BLD: 94.7 SEC — HIGH (ref 27–36.3)
BASOPHILS # BLD AUTO: 0.02 K/UL — SIGNIFICANT CHANGE UP (ref 0–0.2)
BASOPHILS NFR BLD AUTO: 0.4 % — SIGNIFICANT CHANGE UP (ref 0–2)
BUN SERPL-MCNC: 81 MG/DL — HIGH (ref 7–23)
CALCIUM SERPL-MCNC: 10.1 MG/DL — SIGNIFICANT CHANGE UP (ref 8.4–10.5)
CHLORIDE SERPL-SCNC: 94 MMOL/L — LOW (ref 98–107)
CO2 SERPL-SCNC: 35 MMOL/L — HIGH (ref 22–31)
CREAT SERPL-MCNC: 2.72 MG/DL — HIGH (ref 0.5–1.3)
EGFR: 18 ML/MIN/1.73M2 — LOW
EOSINOPHIL # BLD AUTO: 0.08 K/UL — SIGNIFICANT CHANGE UP (ref 0–0.5)
EOSINOPHIL NFR BLD AUTO: 1.8 % — SIGNIFICANT CHANGE UP (ref 0–6)
GLUCOSE BLDC GLUCOMTR-MCNC: 119 MG/DL — HIGH (ref 70–99)
GLUCOSE BLDC GLUCOMTR-MCNC: 144 MG/DL — HIGH (ref 70–99)
GLUCOSE BLDC GLUCOMTR-MCNC: 150 MG/DL — HIGH (ref 70–99)
GLUCOSE BLDC GLUCOMTR-MCNC: 81 MG/DL — SIGNIFICANT CHANGE UP (ref 70–99)
GLUCOSE SERPL-MCNC: 109 MG/DL — HIGH (ref 70–99)
HCT VFR BLD CALC: 28.5 % — LOW (ref 34.5–45)
HGB BLD-MCNC: 8.3 G/DL — LOW (ref 11.5–15.5)
IANC: 3.31 K/UL — SIGNIFICANT CHANGE UP (ref 1.8–7.4)
IMM GRANULOCYTES NFR BLD AUTO: 1.1 % — HIGH (ref 0–0.9)
INR BLD: 1.58 RATIO — HIGH (ref 0.88–1.16)
LYMPHOCYTES # BLD AUTO: 0.49 K/UL — LOW (ref 1–3.3)
LYMPHOCYTES # BLD AUTO: 10.7 % — LOW (ref 13–44)
MAGNESIUM SERPL-MCNC: 2.2 MG/DL — SIGNIFICANT CHANGE UP (ref 1.6–2.6)
MCHC RBC-ENTMCNC: 29.1 GM/DL — LOW (ref 32–36)
MCHC RBC-ENTMCNC: 29.7 PG — SIGNIFICANT CHANGE UP (ref 27–34)
MCV RBC AUTO: 102.2 FL — HIGH (ref 80–100)
MONOCYTES # BLD AUTO: 0.61 K/UL — SIGNIFICANT CHANGE UP (ref 0–0.9)
MONOCYTES NFR BLD AUTO: 13.4 % — SIGNIFICANT CHANGE UP (ref 2–14)
NEUTROPHILS # BLD AUTO: 3.31 K/UL — SIGNIFICANT CHANGE UP (ref 1.8–7.4)
NEUTROPHILS NFR BLD AUTO: 72.6 % — SIGNIFICANT CHANGE UP (ref 43–77)
NRBC # BLD: 0 /100 WBCS — SIGNIFICANT CHANGE UP (ref 0–0)
NRBC # FLD: 0 K/UL — SIGNIFICANT CHANGE UP (ref 0–0)
PHOSPHATE SERPL-MCNC: 3.2 MG/DL — SIGNIFICANT CHANGE UP (ref 2.5–4.5)
PLATELET # BLD AUTO: 227 K/UL — SIGNIFICANT CHANGE UP (ref 150–400)
POTASSIUM SERPL-MCNC: 4.8 MMOL/L — SIGNIFICANT CHANGE UP (ref 3.5–5.3)
POTASSIUM SERPL-SCNC: 4.8 MMOL/L — SIGNIFICANT CHANGE UP (ref 3.5–5.3)
PROTHROM AB SERPL-ACNC: 18.4 SEC — HIGH (ref 10.5–13.4)
RBC # BLD: 2.79 M/UL — LOW (ref 3.8–5.2)
RBC # FLD: 14.6 % — HIGH (ref 10.3–14.5)
SODIUM SERPL-SCNC: 138 MMOL/L — SIGNIFICANT CHANGE UP (ref 135–145)
WBC # BLD: 4.56 K/UL — SIGNIFICANT CHANGE UP (ref 3.8–10.5)
WBC # FLD AUTO: 4.56 K/UL — SIGNIFICANT CHANGE UP (ref 3.8–10.5)

## 2023-02-09 RX ORDER — WARFARIN SODIUM 2.5 MG/1
7.5 TABLET ORAL ONCE
Refills: 0 | Status: COMPLETED | OUTPATIENT
Start: 2023-02-09 | End: 2023-02-09

## 2023-02-09 RX ORDER — WARFARIN SODIUM 2.5 MG/1
7.5 TABLET ORAL ONCE
Refills: 0 | Status: DISCONTINUED | OUTPATIENT
Start: 2023-02-09 | End: 2023-02-09

## 2023-02-09 RX ORDER — MECLIZINE HCL 12.5 MG
25 TABLET ORAL ONCE
Refills: 0 | Status: COMPLETED | OUTPATIENT
Start: 2023-02-09 | End: 2023-02-09

## 2023-02-09 RX ORDER — SODIUM CHLORIDE 9 MG/ML
1000 INJECTION INTRAMUSCULAR; INTRAVENOUS; SUBCUTANEOUS
Refills: 0 | Status: DISCONTINUED | OUTPATIENT
Start: 2023-02-09 | End: 2023-02-13

## 2023-02-09 RX ORDER — SODIUM CHLORIDE 9 MG/ML
1000 INJECTION INTRAMUSCULAR; INTRAVENOUS; SUBCUTANEOUS
Refills: 0 | Status: DISCONTINUED | OUTPATIENT
Start: 2023-02-09 | End: 2023-02-09

## 2023-02-09 RX ADMIN — Medication 1 APPLICATION(S): at 05:32

## 2023-02-09 RX ADMIN — BUDESONIDE AND FORMOTEROL FUMARATE DIHYDRATE 2 PUFF(S): 160; 4.5 AEROSOL RESPIRATORY (INHALATION) at 22:01

## 2023-02-09 RX ADMIN — Medication 5 UNIT(S): at 09:04

## 2023-02-09 RX ADMIN — BUMETANIDE 2 MILLIGRAM(S): 0.25 INJECTION INTRAMUSCULAR; INTRAVENOUS at 05:26

## 2023-02-09 RX ADMIN — Medication 50 MILLIGRAM(S): at 14:54

## 2023-02-09 RX ADMIN — Medication 1 SPRAY(S): at 17:54

## 2023-02-09 RX ADMIN — HEPARIN SODIUM 1000 UNIT(S)/HR: 5000 INJECTION INTRAVENOUS; SUBCUTANEOUS at 20:30

## 2023-02-09 RX ADMIN — BUDESONIDE AND FORMOTEROL FUMARATE DIHYDRATE 2 PUFF(S): 160; 4.5 AEROSOL RESPIRATORY (INHALATION) at 09:03

## 2023-02-09 RX ADMIN — Medication 1 SPRAY(S): at 22:04

## 2023-02-09 RX ADMIN — Medication 5 UNIT(S): at 12:41

## 2023-02-09 RX ADMIN — LIDOCAINE 1 PATCH: 4 CREAM TOPICAL at 19:00

## 2023-02-09 RX ADMIN — WARFARIN SODIUM 7.5 MILLIGRAM(S): 2.5 TABLET ORAL at 22:04

## 2023-02-09 RX ADMIN — Medication 650 MILLIGRAM(S): at 15:24

## 2023-02-09 RX ADMIN — Medication 1 APPLICATION(S): at 12:40

## 2023-02-09 RX ADMIN — Medication 1 SPRAY(S): at 09:04

## 2023-02-09 RX ADMIN — Medication 50 MILLIGRAM(S): at 05:27

## 2023-02-09 RX ADMIN — Medication 25 UNIT(S): at 22:03

## 2023-02-09 RX ADMIN — Medication 1 SPRAY(S): at 05:27

## 2023-02-09 RX ADMIN — LIDOCAINE 1 PATCH: 4 CREAM TOPICAL at 03:08

## 2023-02-09 RX ADMIN — Medication 650 MILLIGRAM(S): at 23:54

## 2023-02-09 RX ADMIN — Medication 650 MILLIGRAM(S): at 14:54

## 2023-02-09 RX ADMIN — SIMVASTATIN 10 MILLIGRAM(S): 20 TABLET, FILM COATED ORAL at 22:03

## 2023-02-09 RX ADMIN — Medication 25 MILLIGRAM(S): at 19:34

## 2023-02-09 RX ADMIN — Medication 50 MILLIGRAM(S): at 22:03

## 2023-02-09 RX ADMIN — Medication 5 MILLIGRAM(S): at 05:26

## 2023-02-09 RX ADMIN — HEPARIN SODIUM 1000 UNIT(S)/HR: 5000 INJECTION INTRAVENOUS; SUBCUTANEOUS at 08:27

## 2023-02-09 RX ADMIN — Medication 1 SPRAY(S): at 14:54

## 2023-02-09 RX ADMIN — SODIUM CHLORIDE 50 MILLILITER(S): 9 INJECTION INTRAMUSCULAR; INTRAVENOUS; SUBCUTANEOUS at 18:18

## 2023-02-09 RX ADMIN — Medication 3 MILLIGRAM(S): at 23:54

## 2023-02-09 RX ADMIN — Medication 1 APPLICATION(S): at 17:54

## 2023-02-09 RX ADMIN — Medication 325 MILLIGRAM(S): at 12:40

## 2023-02-09 RX ADMIN — PANTOPRAZOLE SODIUM 40 MILLIGRAM(S): 20 TABLET, DELAYED RELEASE ORAL at 05:28

## 2023-02-09 RX ADMIN — HEPARIN SODIUM 1000 UNIT(S)/HR: 5000 INJECTION INTRAVENOUS; SUBCUTANEOUS at 17:57

## 2023-02-09 RX ADMIN — AMIODARONE HYDROCHLORIDE 200 MILLIGRAM(S): 400 TABLET ORAL at 05:26

## 2023-02-09 RX ADMIN — ISOSORBIDE MONONITRATE 30 MILLIGRAM(S): 60 TABLET, EXTENDED RELEASE ORAL at 12:40

## 2023-02-09 RX ADMIN — LIDOCAINE 1 PATCH: 4 CREAM TOPICAL at 14:40

## 2023-02-09 NOTE — PROGRESS NOTE ADULT - SUBJECTIVE AND OBJECTIVE BOX
SUBJECTIVE / OVERNIGHT EVENTS:pt seen and examined  02-09-23    MEDICATIONS  (STANDING):  aMIOdarone    Tablet 200 milliGRAM(s) Oral daily  budesonide 160 MICROgram(s)/formoterol 4.5 MICROgram(s) Inhaler 2 Puff(s) Inhalation two times a day  buMETAnide 2 milliGRAM(s) Oral daily  dextrose 5%. 1000 milliLiter(s) (50 mL/Hr) IV Continuous <Continuous>  dextrose 5%. 1000 milliLiter(s) (100 mL/Hr) IV Continuous <Continuous>  dextrose 50% Injectable 25 Gram(s) IV Push once  dextrose 50% Injectable 12.5 Gram(s) IV Push once  dextrose 50% Injectable 25 Gram(s) IV Push once  ferrous    sulfate 325 milliGRAM(s) Oral daily  glucagon  Injectable 1 milliGRAM(s) IntraMuscular once  heparin  Infusion.  Unit(s)/Hr (19 mL/Hr) IV Continuous <Continuous>  hydrALAZINE 50 milliGRAM(s) Oral three times a day  insulin detemir injectable (LEVEMIR) 25 Unit(s) SubCutaneous at bedtime  insulin lispro (ADMELOG) corrective regimen sliding scale   SubCutaneous three times a day before meals  insulin lispro (ADMELOG) corrective regimen sliding scale   SubCutaneous at bedtime  insulin lispro Injectable (ADMELOG) 5 Unit(s) SubCutaneous three times a day before meals  isosorbide   mononitrate ER Tablet (IMDUR) 30 milliGRAM(s) Oral daily  pantoprazole    Tablet 40 milliGRAM(s) Oral before breakfast  petrolatum white Ointment 1 Application(s) Topical every 6 hours  predniSONE   Tablet 5 milliGRAM(s) Oral daily  simvastatin 10 milliGRAM(s) Oral at bedtime  sodium chloride 0.65% Nasal 1 Spray(s) Both Nostrils every 4 hours  sodium chloride 0.9%. 1000 milliLiter(s) (50 mL/Hr) IV Continuous <Continuous>  warfarin 7.5 milliGRAM(s) Oral once    MEDICATIONS  (PRN):  acetaminophen     Tablet .. 650 milliGRAM(s) Oral every 6 hours PRN Temp greater or equal to 38C (100.4F), Mild Pain (1 - 3)  albuterol    90 MICROgram(s) HFA Inhaler 2 Puff(s) Inhalation every 6 hours PRN Bronchospasm  aluminum hydroxide/magnesium hydroxide/simethicone Suspension 30 milliLiter(s) Oral every 4 hours PRN Dyspepsia  dextrose Oral Gel 15 Gram(s) Oral once PRN Blood Glucose LESS THAN 70 milliGRAM(s)/deciliter  heparin   Injectable 9000 Unit(s) IV Push every 6 hours PRN For aPTT less than 40  heparin   Injectable 4000 Unit(s) IV Push every 6 hours PRN For aPTT between 40 - 57  lidocaine   4% Patch 1 Patch Transdermal daily PRN pain  melatonin 3 milliGRAM(s) Oral at bedtime PRN Insomnia  ondansetron Injectable 4 milliGRAM(s) IV Push every 8 hours PRN Nausea and/or Vomiting  oxymetazoline 0.05% Nasal Spray 2 Spray(s) Both Nostrils every 12 hours PRN Nose bleeds    Vital Signs Last 24 Hrs  T(C): 36.6 (02-09-23 @ 13:44), Max: 36.8 (02-08-23 @ 21:29)  T(F): 97.8 (02-09-23 @ 13:44), Max: 98.2 (02-08-23 @ 21:29)  HR: 66 (02-09-23 @ 19:31) (59 - 84)  BP: 119/56 (02-09-23 @ 18:01) (115/48 - 137/54)  BP(mean): --  RR: 17 (02-09-23 @ 18:01) (17 - 18)  SpO2: 100% (02-09-23 @ 19:31) (99% - 100%)            Constitutional: No fever, fatigue  Skin: No rash.  Eyes: No recent vision problems or eye pain.  ENT: No congestion, ear pain, or sore throat.  Cardiovascular: No chest pain or palpation.  Respiratory: No cough, shortness of breath, congestion, or wheezing.  Gastrointestinal: No abdominal pain, nausea, vomiting, or diarrhea.  Genitourinary: No dysuria.  Musculoskeletal: No joint swelling.  Neurologic: No headache.    PHYSICAL EXAM:  GENERAL: NAD  EYES: EOMI, PERRLA  NECK: Supple, No JVD  CHEST/LUNG: dec breath sounds at bases  HEART:  S1 , S2 +  ABDOMEN: soft, bs+  EXTREMITIES:  edema+  NEUROLOGY:alert awake oriented    LABS:  02-09    138  |  94<L>  |  81<H>  ----------------------------<  109<H>  4.8   |  35<H>  |  2.72<H>    Ca    10.1      09 Feb 2023 08:21  Phos  3.2     02-09  Mg     2.20     02-09      Creatinine Trend: 2.72 <--, 2.28 <--, 2.18 <--, 1.94 <--, 2.08 <--                        8.3    4.56  )-----------( 227      ( 09 Feb 2023 08:21 )             28.5     Urine Studies:              PT/INR - ( 09 Feb 2023 08:21 )   PT: 18.4 sec;   INR: 1.58 ratio         PTT - ( 09 Feb 2023 08:21 )  PTT:94.7 sec    Imaging Personally Reviewed:yes    Consultant(s) Notes Reviewed:  yes    Care Discussed with Consultants/Other Providers:yes

## 2023-02-10 LAB
ANION GAP SERPL CALC-SCNC: 9 MMOL/L — SIGNIFICANT CHANGE UP (ref 7–14)
APTT BLD: 108.1 SEC — HIGH (ref 27–36.3)
APTT BLD: 75.7 SEC — HIGH (ref 27–36.3)
BASOPHILS # BLD AUTO: 0.02 K/UL — SIGNIFICANT CHANGE UP (ref 0–0.2)
BASOPHILS NFR BLD AUTO: 0.4 % — SIGNIFICANT CHANGE UP (ref 0–2)
BUN SERPL-MCNC: 81 MG/DL — HIGH (ref 7–23)
CALCIUM SERPL-MCNC: 9.7 MG/DL — SIGNIFICANT CHANGE UP (ref 8.4–10.5)
CHLORIDE SERPL-SCNC: 95 MMOL/L — LOW (ref 98–107)
CO2 SERPL-SCNC: 35 MMOL/L — HIGH (ref 22–31)
CREAT SERPL-MCNC: 2.64 MG/DL — HIGH (ref 0.5–1.3)
EGFR: 18 ML/MIN/1.73M2 — LOW
EOSINOPHIL # BLD AUTO: 0.09 K/UL — SIGNIFICANT CHANGE UP (ref 0–0.5)
EOSINOPHIL NFR BLD AUTO: 2 % — SIGNIFICANT CHANGE UP (ref 0–6)
GLUCOSE BLDC GLUCOMTR-MCNC: 100 MG/DL — HIGH (ref 70–99)
GLUCOSE BLDC GLUCOMTR-MCNC: 107 MG/DL — HIGH (ref 70–99)
GLUCOSE BLDC GLUCOMTR-MCNC: 129 MG/DL — HIGH (ref 70–99)
GLUCOSE BLDC GLUCOMTR-MCNC: 146 MG/DL — HIGH (ref 70–99)
GLUCOSE BLDC GLUCOMTR-MCNC: 245 MG/DL — HIGH (ref 70–99)
GLUCOSE SERPL-MCNC: 111 MG/DL — HIGH (ref 70–99)
HCT VFR BLD CALC: 26.8 % — LOW (ref 34.5–45)
HGB BLD-MCNC: 7.7 G/DL — LOW (ref 11.5–15.5)
IANC: 2.77 K/UL — SIGNIFICANT CHANGE UP (ref 1.8–7.4)
IMM GRANULOCYTES NFR BLD AUTO: 2 % — HIGH (ref 0–0.9)
INR BLD: 1.95 RATIO — HIGH (ref 0.88–1.16)
LYMPHOCYTES # BLD AUTO: 0.85 K/UL — LOW (ref 1–3.3)
LYMPHOCYTES # BLD AUTO: 19.1 % — SIGNIFICANT CHANGE UP (ref 13–44)
MAGNESIUM SERPL-MCNC: 2.2 MG/DL — SIGNIFICANT CHANGE UP (ref 1.6–2.6)
MCHC RBC-ENTMCNC: 28.7 GM/DL — LOW (ref 32–36)
MCHC RBC-ENTMCNC: 30.2 PG — SIGNIFICANT CHANGE UP (ref 27–34)
MCV RBC AUTO: 105.1 FL — HIGH (ref 80–100)
MONOCYTES # BLD AUTO: 0.64 K/UL — SIGNIFICANT CHANGE UP (ref 0–0.9)
MONOCYTES NFR BLD AUTO: 14.3 % — HIGH (ref 2–14)
NEUTROPHILS # BLD AUTO: 2.77 K/UL — SIGNIFICANT CHANGE UP (ref 1.8–7.4)
NEUTROPHILS NFR BLD AUTO: 62.2 % — SIGNIFICANT CHANGE UP (ref 43–77)
NRBC # BLD: 0 /100 WBCS — SIGNIFICANT CHANGE UP (ref 0–0)
NRBC # FLD: 0 K/UL — SIGNIFICANT CHANGE UP (ref 0–0)
PHOSPHATE SERPL-MCNC: 3.3 MG/DL — SIGNIFICANT CHANGE UP (ref 2.5–4.5)
PLATELET # BLD AUTO: 249 K/UL — SIGNIFICANT CHANGE UP (ref 150–400)
POTASSIUM SERPL-MCNC: 4.5 MMOL/L — SIGNIFICANT CHANGE UP (ref 3.5–5.3)
POTASSIUM SERPL-SCNC: 4.5 MMOL/L — SIGNIFICANT CHANGE UP (ref 3.5–5.3)
PROTHROM AB SERPL-ACNC: 22.8 SEC — HIGH (ref 10.5–13.4)
RBC # BLD: 2.55 M/UL — LOW (ref 3.8–5.2)
RBC # FLD: 14.9 % — HIGH (ref 10.3–14.5)
SODIUM SERPL-SCNC: 139 MMOL/L — SIGNIFICANT CHANGE UP (ref 135–145)
WBC # BLD: 4.46 K/UL — SIGNIFICANT CHANGE UP (ref 3.8–10.5)
WBC # FLD AUTO: 4.46 K/UL — SIGNIFICANT CHANGE UP (ref 3.8–10.5)

## 2023-02-10 PROCEDURE — 99232 SBSQ HOSP IP/OBS MODERATE 35: CPT

## 2023-02-10 RX ORDER — MECLIZINE HCL 12.5 MG
25 TABLET ORAL ONCE
Refills: 0 | Status: COMPLETED | OUTPATIENT
Start: 2023-02-10 | End: 2023-02-10

## 2023-02-10 RX ORDER — WARFARIN SODIUM 2.5 MG/1
7.5 TABLET ORAL ONCE
Refills: 0 | Status: COMPLETED | OUTPATIENT
Start: 2023-02-10 | End: 2023-02-10

## 2023-02-10 RX ORDER — ACETAMINOPHEN 500 MG
1000 TABLET ORAL ONCE
Refills: 0 | Status: COMPLETED | OUTPATIENT
Start: 2023-02-10 | End: 2023-02-10

## 2023-02-10 RX ADMIN — Medication 650 MILLIGRAM(S): at 00:30

## 2023-02-10 RX ADMIN — Medication 400 MILLIGRAM(S): at 18:15

## 2023-02-10 RX ADMIN — HEPARIN SODIUM 800 UNIT(S)/HR: 5000 INJECTION INTRAVENOUS; SUBCUTANEOUS at 07:44

## 2023-02-10 RX ADMIN — HEPARIN SODIUM 800 UNIT(S)/HR: 5000 INJECTION INTRAVENOUS; SUBCUTANEOUS at 22:46

## 2023-02-10 RX ADMIN — HEPARIN SODIUM 800 UNIT(S)/HR: 5000 INJECTION INTRAVENOUS; SUBCUTANEOUS at 08:13

## 2023-02-10 RX ADMIN — HEPARIN SODIUM 800 UNIT(S)/HR: 5000 INJECTION INTRAVENOUS; SUBCUTANEOUS at 20:15

## 2023-02-10 RX ADMIN — Medication 1 SPRAY(S): at 02:34

## 2023-02-10 RX ADMIN — BUDESONIDE AND FORMOTEROL FUMARATE DIHYDRATE 2 PUFF(S): 160; 4.5 AEROSOL RESPIRATORY (INHALATION) at 09:22

## 2023-02-10 RX ADMIN — Medication 5 MILLIGRAM(S): at 06:05

## 2023-02-10 RX ADMIN — Medication 5 UNIT(S): at 14:09

## 2023-02-10 RX ADMIN — Medication 1 SPRAY(S): at 22:15

## 2023-02-10 RX ADMIN — Medication 1000 MILLIGRAM(S): at 18:45

## 2023-02-10 RX ADMIN — Medication 25 UNIT(S): at 22:14

## 2023-02-10 RX ADMIN — Medication 1 APPLICATION(S): at 18:06

## 2023-02-10 RX ADMIN — LIDOCAINE 1 PATCH: 4 CREAM TOPICAL at 16:51

## 2023-02-10 RX ADMIN — Medication 50 MILLIGRAM(S): at 22:14

## 2023-02-10 RX ADMIN — BUDESONIDE AND FORMOTEROL FUMARATE DIHYDRATE 2 PUFF(S): 160; 4.5 AEROSOL RESPIRATORY (INHALATION) at 22:14

## 2023-02-10 RX ADMIN — Medication 1 APPLICATION(S): at 00:04

## 2023-02-10 RX ADMIN — Medication 1 SPRAY(S): at 18:05

## 2023-02-10 RX ADMIN — Medication 5 UNIT(S): at 09:22

## 2023-02-10 RX ADMIN — Medication 1 APPLICATION(S): at 06:06

## 2023-02-10 RX ADMIN — WARFARIN SODIUM 7.5 MILLIGRAM(S): 2.5 TABLET ORAL at 22:13

## 2023-02-10 RX ADMIN — AMIODARONE HYDROCHLORIDE 200 MILLIGRAM(S): 400 TABLET ORAL at 06:04

## 2023-02-10 RX ADMIN — Medication 5 UNIT(S): at 18:05

## 2023-02-10 RX ADMIN — Medication 25 MILLIGRAM(S): at 12:15

## 2023-02-10 RX ADMIN — BUMETANIDE 2 MILLIGRAM(S): 0.25 INJECTION INTRAMUSCULAR; INTRAVENOUS at 06:05

## 2023-02-10 RX ADMIN — Medication 1 SPRAY(S): at 14:08

## 2023-02-10 RX ADMIN — Medication 1 APPLICATION(S): at 12:15

## 2023-02-10 RX ADMIN — HEPARIN SODIUM 800 UNIT(S)/HR: 5000 INJECTION INTRAVENOUS; SUBCUTANEOUS at 20:13

## 2023-02-10 RX ADMIN — Medication 325 MILLIGRAM(S): at 12:15

## 2023-02-10 RX ADMIN — Medication 50 MILLIGRAM(S): at 15:45

## 2023-02-10 RX ADMIN — ISOSORBIDE MONONITRATE 30 MILLIGRAM(S): 60 TABLET, EXTENDED RELEASE ORAL at 12:15

## 2023-02-10 RX ADMIN — Medication 1 SPRAY(S): at 06:05

## 2023-02-10 RX ADMIN — Medication 50 MILLIGRAM(S): at 06:05

## 2023-02-10 RX ADMIN — Medication 1 SPRAY(S): at 09:23

## 2023-02-10 RX ADMIN — SIMVASTATIN 10 MILLIGRAM(S): 20 TABLET, FILM COATED ORAL at 22:14

## 2023-02-10 NOTE — PROGRESS NOTE ADULT - SUBJECTIVE AND OBJECTIVE BOX
PULMONARY PROGRESS NOTE    DAMARI GUERRERO  MRN-[id number]    Patient is a 72y old  Female who presents with a chief complaint of Epistaxis (09 Feb 2023 12:47)      HPI:  epistaxis ongoing  intermittent  on NC  using her bpap    ROS:   -    ACTIVE MEDICATION LIST:  MEDICATIONS  (STANDING):  aMIOdarone    Tablet 200 milliGRAM(s) Oral daily  budesonide 160 MICROgram(s)/formoterol 4.5 MICROgram(s) Inhaler 2 Puff(s) Inhalation two times a day  buMETAnide 2 milliGRAM(s) Oral daily  dextrose 5%. 1000 milliLiter(s) (50 mL/Hr) IV Continuous <Continuous>  dextrose 5%. 1000 milliLiter(s) (100 mL/Hr) IV Continuous <Continuous>  dextrose 50% Injectable 25 Gram(s) IV Push once  dextrose 50% Injectable 12.5 Gram(s) IV Push once  dextrose 50% Injectable 25 Gram(s) IV Push once  ferrous    sulfate 325 milliGRAM(s) Oral daily  glucagon  Injectable 1 milliGRAM(s) IntraMuscular once  heparin  Infusion.  Unit(s)/Hr (19 mL/Hr) IV Continuous <Continuous>  hydrALAZINE 50 milliGRAM(s) Oral three times a day  insulin detemir injectable (LEVEMIR) 25 Unit(s) SubCutaneous at bedtime  insulin lispro (ADMELOG) corrective regimen sliding scale   SubCutaneous three times a day before meals  insulin lispro (ADMELOG) corrective regimen sliding scale   SubCutaneous at bedtime  insulin lispro Injectable (ADMELOG) 5 Unit(s) SubCutaneous three times a day before meals  isosorbide   mononitrate ER Tablet (IMDUR) 30 milliGRAM(s) Oral daily  pantoprazole    Tablet 40 milliGRAM(s) Oral before breakfast  petrolatum white Ointment 1 Application(s) Topical every 6 hours  predniSONE   Tablet 5 milliGRAM(s) Oral daily  simvastatin 10 milliGRAM(s) Oral at bedtime  sodium chloride 0.65% Nasal 1 Spray(s) Both Nostrils every 4 hours  sodium chloride 0.9%. 1000 milliLiter(s) (50 mL/Hr) IV Continuous <Continuous>    MEDICATIONS  (PRN):  acetaminophen     Tablet .. 650 milliGRAM(s) Oral every 6 hours PRN Temp greater or equal to 38C (100.4F), Mild Pain (1 - 3)  albuterol    90 MICROgram(s) HFA Inhaler 2 Puff(s) Inhalation every 6 hours PRN Bronchospasm  aluminum hydroxide/magnesium hydroxide/simethicone Suspension 30 milliLiter(s) Oral every 4 hours PRN Dyspepsia  dextrose Oral Gel 15 Gram(s) Oral once PRN Blood Glucose LESS THAN 70 milliGRAM(s)/deciliter  heparin   Injectable 9000 Unit(s) IV Push every 6 hours PRN For aPTT less than 40  heparin   Injectable 4000 Unit(s) IV Push every 6 hours PRN For aPTT between 40 - 57  lidocaine   4% Patch 1 Patch Transdermal daily PRN pain  melatonin 3 milliGRAM(s) Oral at bedtime PRN Insomnia  ondansetron Injectable 4 milliGRAM(s) IV Push every 8 hours PRN Nausea and/or Vomiting  oxymetazoline 0.05% Nasal Spray 2 Spray(s) Both Nostrils every 12 hours PRN Nose bleeds      EXAM:  Vital Signs Last 24 Hrs  T(C): 36.6 (10 Feb 2023 05:45), Max: 36.8 (09 Feb 2023 21:27)  T(F): 97.9 (10 Feb 2023 05:45), Max: 98.2 (09 Feb 2023 21:27)  HR: 64 (10 Feb 2023 12:06) (60 - 70)  BP: 126/51 (10 Feb 2023 12:07) (119/56 - 132/59)  BP(mean): --  RR: 19 (10 Feb 2023 05:45) (17 - 19)  SpO2: 98% (10 Feb 2023 07:27) (95% - 100%)    Parameters below as of 10 Feb 2023 12:07    O2 Flow (L/min): 77      GENERAL: The patient is awake and alert in no apparent distress.     LUNGS: Clear to auscultation without wheezing, rales or rhonchi; respirations unlabored                               7.7    4.46  )-----------( 249      ( 10 Feb 2023 06:38 )             26.8       02-10    139  |  95<L>  |  81<H>  ----------------------------<  111<H>  4.5   |  35<H>  |  2.64<H>    Ca    9.7      10 Feb 2023 06:38  Phos  3.3     02-10  Mg     2.20     02-10       < from: Xray Chest 1 View AP/PA (02.04.23 @ 20:03) >    ACC: [id number] EXAM:  XR CHEST AP OR PA 1V   ORDERED BY: ILIR LEMOS     PROCEDURE DATE:  02/04/2023          INTERPRETATION:  EXAMINATION: XR CHEST    CLINICAL INDICATION: Shortness of breath    TECHNIQUE: Single frontal portable view of the chest from 2/4/2023 8:03 PM    COMPARISON: 1/17/2023    FINDINGS:  Status post sternotomy and mitral and tricuspid valve repairs.    The heart size is not accurately assessed on this projection.  Diffuse bilateral patchy opacities.  There is no pneumothoraxor pleural effusion.    IMPRESSION:  Diffuse bilateral patchy opacities, unchanged from prior.    --- End of Report ---          BECKA ROBB MD; Resident Radiologist  This document has been electronically signed.  HALEIGH MOTA M.D., ATTENDING INTERVENTIONAL RADIOLOGIST  This document has been electronically signed. F    < end of copied text >  < from: CT Chest No Cont (01.19.23 @ 14:11) >    ACC: [id number] EXAM:  CT CHEST   ORDERED BY: ALEX DURAN     PROCEDURE DATE:  01/19/2023          INTERPRETATION:  CLINICAL INFORMATION: Short of breath. Evaluate for   pulmonary edema.    COMPARISON: CT chest 1/8/2022. CT chest 3/12/2009.    CONTRAST/COMPLICATIONS:  IV Contrast: NONE  Oral Contrast: NONE  Complications: None reported at time of study completion    PROCEDURE:  CT scan of the chest was obtained without intravenous contrast.    FINDINGS:    LYMPH NODES: Calcified mediastinallymph nodes.    HEART/VASCULATURE: Cardiomegaly. No pericardial effusion. Status post   mitral and tricuspid valve repair. Retained epicardial pacing wires.    AIRWAYS/LUNGS/PLEURA: Patent central airways. Left upper lobe linear   atelectasis versus scarring. Previously seen right upper lobe 3 mm nodule   has resolved. No pleural effusion.    UPPER ABDOMEN: Redemonstrated left upper pole renal mass measuring 3.7   cm, slightly increased from 1/8/2022. Cirrhotic morphology of the liver.    BONES/SOFT TISSUES: Status post median sternotomy.    IMPRESSION:    Since CT chest 1/8/2022:    No pulmonary edema.    Left upper pole renal mass demonstrating slight Interval increase sinc    < end of copied text >      PROBLEM LIST:  72y Female with HEALTH ISSUES - PROBLEM Dx:  Epistaxis    Chronic atrial fibrillation    COPD, severe    CHF (congestive heart failure)    History of mitral valve replacement with mechanical valve    Hypertension    Need for prophylactic measure    Type 2 diabetes mellitus              RECS:  humidify 02  f/u ent  bpap qhs          Please call with any questions over the weekend  Irma Eaton DO  [facility] Pulmonary/Sleep Medicine  [phone]

## 2023-02-10 NOTE — PROGRESS NOTE ADULT - SUBJECTIVE AND OBJECTIVE BOX
SUBJECTIVE / OVERNIGHT EVENTS:pt seen and examined  02-10-23    MEDICATIONS  (STANDING):  aMIOdarone    Tablet 200 milliGRAM(s) Oral daily  budesonide 160 MICROgram(s)/formoterol 4.5 MICROgram(s) Inhaler 2 Puff(s) Inhalation two times a day  buMETAnide 2 milliGRAM(s) Oral daily  dextrose 5%. 1000 milliLiter(s) (50 mL/Hr) IV Continuous <Continuous>  dextrose 5%. 1000 milliLiter(s) (100 mL/Hr) IV Continuous <Continuous>  dextrose 50% Injectable 25 Gram(s) IV Push once  dextrose 50% Injectable 12.5 Gram(s) IV Push once  dextrose 50% Injectable 25 Gram(s) IV Push once  ferrous    sulfate 325 milliGRAM(s) Oral daily  glucagon  Injectable 1 milliGRAM(s) IntraMuscular once  heparin  Infusion.  Unit(s)/Hr (19 mL/Hr) IV Continuous <Continuous>  hydrALAZINE 50 milliGRAM(s) Oral three times a day  insulin detemir injectable (LEVEMIR) 25 Unit(s) SubCutaneous at bedtime  insulin lispro (ADMELOG) corrective regimen sliding scale   SubCutaneous three times a day before meals  insulin lispro (ADMELOG) corrective regimen sliding scale   SubCutaneous at bedtime  insulin lispro Injectable (ADMELOG) 5 Unit(s) SubCutaneous three times a day before meals  isosorbide   mononitrate ER Tablet (IMDUR) 30 milliGRAM(s) Oral daily  pantoprazole    Tablet 40 milliGRAM(s) Oral before breakfast  petrolatum white Ointment 1 Application(s) Topical every 6 hours  predniSONE   Tablet 5 milliGRAM(s) Oral daily  simvastatin 10 milliGRAM(s) Oral at bedtime  sodium chloride 0.65% Nasal 1 Spray(s) Both Nostrils every 4 hours  sodium chloride 0.9%. 1000 milliLiter(s) (50 mL/Hr) IV Continuous <Continuous>  warfarin 7.5 milliGRAM(s) Oral once    MEDICATIONS  (PRN):  acetaminophen     Tablet .. 650 milliGRAM(s) Oral every 6 hours PRN Temp greater or equal to 38C (100.4F), Mild Pain (1 - 3)  albuterol    90 MICROgram(s) HFA Inhaler 2 Puff(s) Inhalation every 6 hours PRN Bronchospasm  aluminum hydroxide/magnesium hydroxide/simethicone Suspension 30 milliLiter(s) Oral every 4 hours PRN Dyspepsia  dextrose Oral Gel 15 Gram(s) Oral once PRN Blood Glucose LESS THAN 70 milliGRAM(s)/deciliter  heparin   Injectable 9000 Unit(s) IV Push every 6 hours PRN For aPTT less than 40  heparin   Injectable 4000 Unit(s) IV Push every 6 hours PRN For aPTT between 40 - 57  lidocaine   4% Patch 1 Patch Transdermal daily PRN pain  melatonin 3 milliGRAM(s) Oral at bedtime PRN Insomnia  ondansetron Injectable 4 milliGRAM(s) IV Push every 8 hours PRN Nausea and/or Vomiting  oxymetazoline 0.05% Nasal Spray 2 Spray(s) Both Nostrils every 12 hours PRN Nose bleeds    Vital Signs Last 24 Hrs  T(C): 36.6 (02-10-23 @ 05:45), Max: 36.8 (02-09-23 @ 21:27)  T(F): 97.9 (02-10-23 @ 05:45), Max: 98.2 (02-09-23 @ 21:27)  HR: 68 (02-10-23 @ 15:48) (60 - 70)  BP: 122/50 (02-10-23 @ 15:48) (120/96 - 132/59)  BP(mean): --  RR: 19 (02-10-23 @ 05:45) (19 - 19)  SpO2: 98% (02-10-23 @ 15:15) (95% - 100%)            Constitutional: No fever, fatigue  Skin: No rash.  Eyes: No recent vision problems or eye pain.  ENT: No congestion, ear pain, or sore throat.  Cardiovascular: No chest pain or palpation.  Respiratory: No cough, shortness of breath, congestion, or wheezing.  Gastrointestinal: No abdominal pain, nausea, vomiting, or diarrhea.  Genitourinary: No dysuria.  Musculoskeletal: No joint swelling.  Neurologic: No headache.    PHYSICAL EXAM:  GENERAL: NAD  EYES: EOMI, PERRLA  NECK: Supple, No JVD  CHEST/LUNG: dec breath sounds at bases  HEART:  S1 , S2 +  ABDOMEN: soft, bs+  EXTREMITIES:  edema+  NEUROLOGY:alert awake oriented    LABS:  02-10    139  |  95<L>  |  81<H>  ----------------------------<  111<H>  4.5   |  35<H>  |  2.64<H>    Ca    9.7      10 Feb 2023 06:38  Phos  3.3     02-10  Mg     2.20     02-10      Creatinine Trend: 2.64 <--, 2.72 <--, 2.28 <--, 2.18 <--, 1.94 <--, 2.08 <--                        7.7    4.46  )-----------( 249      ( 10 Feb 2023 06:38 )             26.8     Urine Studies:              PT/INR - ( 10 Feb 2023 06:38 )   PT: 22.8 sec;   INR: 1.95 ratio         PTT - ( 10 Feb 2023 14:04 )  PTT:75.7 sec     PTT - ( 09 Feb 2023 08:21 )  PTT:94.7 sec    Imaging Personally Reviewed:yes    Consultant(s) Notes Reviewed:  yes    Care Discussed with Consultants/Other Providers:yes

## 2023-02-11 LAB
ANION GAP SERPL CALC-SCNC: 10 MMOL/L — SIGNIFICANT CHANGE UP (ref 7–14)
APTT BLD: 75.9 SEC — HIGH (ref 27–36.3)
APTT BLD: 84.4 SEC — HIGH (ref 27–36.3)
BUN SERPL-MCNC: 77 MG/DL — HIGH (ref 7–23)
CALCIUM SERPL-MCNC: 10.1 MG/DL — SIGNIFICANT CHANGE UP (ref 8.4–10.5)
CHLORIDE SERPL-SCNC: 94 MMOL/L — LOW (ref 98–107)
CO2 SERPL-SCNC: 34 MMOL/L — HIGH (ref 22–31)
CREAT SERPL-MCNC: 2.6 MG/DL — HIGH (ref 0.5–1.3)
EGFR: 19 ML/MIN/1.73M2 — LOW
GLUCOSE BLDC GLUCOMTR-MCNC: 117 MG/DL — HIGH (ref 70–99)
GLUCOSE BLDC GLUCOMTR-MCNC: 129 MG/DL — HIGH (ref 70–99)
GLUCOSE BLDC GLUCOMTR-MCNC: 143 MG/DL — HIGH (ref 70–99)
GLUCOSE BLDC GLUCOMTR-MCNC: 153 MG/DL — HIGH (ref 70–99)
GLUCOSE SERPL-MCNC: 135 MG/DL — HIGH (ref 70–99)
HCT VFR BLD CALC: 29.1 % — LOW (ref 34.5–45)
HGB BLD-MCNC: 8.4 G/DL — LOW (ref 11.5–15.5)
INR BLD: 2.06 RATIO — HIGH (ref 0.88–1.16)
MAGNESIUM SERPL-MCNC: 2.2 MG/DL — SIGNIFICANT CHANGE UP (ref 1.6–2.6)
MCHC RBC-ENTMCNC: 28.9 GM/DL — LOW (ref 32–36)
MCHC RBC-ENTMCNC: 30.3 PG — SIGNIFICANT CHANGE UP (ref 27–34)
MCV RBC AUTO: 105.1 FL — HIGH (ref 80–100)
NRBC # BLD: 0 /100 WBCS — SIGNIFICANT CHANGE UP (ref 0–0)
NRBC # FLD: 0 K/UL — SIGNIFICANT CHANGE UP (ref 0–0)
PHOSPHATE SERPL-MCNC: 3.6 MG/DL — SIGNIFICANT CHANGE UP (ref 2.5–4.5)
PLATELET # BLD AUTO: 313 K/UL — SIGNIFICANT CHANGE UP (ref 150–400)
POTASSIUM SERPL-MCNC: 4.5 MMOL/L — SIGNIFICANT CHANGE UP (ref 3.5–5.3)
POTASSIUM SERPL-SCNC: 4.5 MMOL/L — SIGNIFICANT CHANGE UP (ref 3.5–5.3)
PROTHROM AB SERPL-ACNC: 24.1 SEC — HIGH (ref 10.5–13.4)
RBC # BLD: 2.77 M/UL — LOW (ref 3.8–5.2)
RBC # FLD: 14.6 % — HIGH (ref 10.3–14.5)
SODIUM SERPL-SCNC: 138 MMOL/L — SIGNIFICANT CHANGE UP (ref 135–145)
WBC # BLD: 4.81 K/UL — SIGNIFICANT CHANGE UP (ref 3.8–10.5)
WBC # FLD AUTO: 4.81 K/UL — SIGNIFICANT CHANGE UP (ref 3.8–10.5)

## 2023-02-11 RX ORDER — WARFARIN SODIUM 2.5 MG/1
7.5 TABLET ORAL ONCE
Refills: 0 | Status: COMPLETED | OUTPATIENT
Start: 2023-02-11 | End: 2023-02-11

## 2023-02-11 RX ORDER — ACETAMINOPHEN 500 MG
1000 TABLET ORAL ONCE
Refills: 0 | Status: COMPLETED | OUTPATIENT
Start: 2023-02-11 | End: 2023-02-11

## 2023-02-11 RX ADMIN — Medication 5 MILLIGRAM(S): at 06:05

## 2023-02-11 RX ADMIN — Medication 50 MILLIGRAM(S): at 06:05

## 2023-02-11 RX ADMIN — BUDESONIDE AND FORMOTEROL FUMARATE DIHYDRATE 2 PUFF(S): 160; 4.5 AEROSOL RESPIRATORY (INHALATION) at 22:01

## 2023-02-11 RX ADMIN — Medication 1000 MILLIGRAM(S): at 22:20

## 2023-02-11 RX ADMIN — Medication 25 UNIT(S): at 23:04

## 2023-02-11 RX ADMIN — AMIODARONE HYDROCHLORIDE 200 MILLIGRAM(S): 400 TABLET ORAL at 06:05

## 2023-02-11 RX ADMIN — Medication 5 UNIT(S): at 18:17

## 2023-02-11 RX ADMIN — Medication 325 MILLIGRAM(S): at 13:01

## 2023-02-11 RX ADMIN — Medication 50 MILLIGRAM(S): at 22:01

## 2023-02-11 RX ADMIN — SIMVASTATIN 10 MILLIGRAM(S): 20 TABLET, FILM COATED ORAL at 22:01

## 2023-02-11 RX ADMIN — Medication 5 UNIT(S): at 12:31

## 2023-02-11 RX ADMIN — Medication 5 UNIT(S): at 08:40

## 2023-02-11 RX ADMIN — Medication 400 MILLIGRAM(S): at 22:00

## 2023-02-11 RX ADMIN — WARFARIN SODIUM 7.5 MILLIGRAM(S): 2.5 TABLET ORAL at 22:01

## 2023-02-11 RX ADMIN — Medication 1 SPRAY(S): at 22:01

## 2023-02-11 RX ADMIN — BUDESONIDE AND FORMOTEROL FUMARATE DIHYDRATE 2 PUFF(S): 160; 4.5 AEROSOL RESPIRATORY (INHALATION) at 13:01

## 2023-02-11 RX ADMIN — Medication 1 APPLICATION(S): at 06:06

## 2023-02-11 RX ADMIN — Medication 1 APPLICATION(S): at 00:52

## 2023-02-11 RX ADMIN — Medication 1 SPRAY(S): at 11:18

## 2023-02-11 RX ADMIN — ISOSORBIDE MONONITRATE 30 MILLIGRAM(S): 60 TABLET, EXTENDED RELEASE ORAL at 13:01

## 2023-02-11 RX ADMIN — HEPARIN SODIUM 800 UNIT(S)/HR: 5000 INJECTION INTRAVENOUS; SUBCUTANEOUS at 07:35

## 2023-02-11 RX ADMIN — Medication 1 SPRAY(S): at 06:05

## 2023-02-11 RX ADMIN — HEPARIN SODIUM 800 UNIT(S)/HR: 5000 INJECTION INTRAVENOUS; SUBCUTANEOUS at 02:49

## 2023-02-11 RX ADMIN — Medication 1 SPRAY(S): at 02:51

## 2023-02-11 RX ADMIN — BUMETANIDE 2 MILLIGRAM(S): 0.25 INJECTION INTRAMUSCULAR; INTRAVENOUS at 06:05

## 2023-02-11 RX ADMIN — Medication 1 SPRAY(S): at 18:17

## 2023-02-11 RX ADMIN — Medication 1 APPLICATION(S): at 18:17

## 2023-02-11 RX ADMIN — Medication 50 MILLIGRAM(S): at 13:02

## 2023-02-11 RX ADMIN — HEPARIN SODIUM 800 UNIT(S)/HR: 5000 INJECTION INTRAVENOUS; SUBCUTANEOUS at 19:45

## 2023-02-11 RX ADMIN — Medication 1 SPRAY(S): at 13:02

## 2023-02-11 RX ADMIN — LIDOCAINE 1 PATCH: 4 CREAM TOPICAL at 21:10

## 2023-02-11 RX ADMIN — Medication 1 APPLICATION(S): at 13:04

## 2023-02-11 NOTE — PROGRESS NOTE ADULT - SUBJECTIVE AND OBJECTIVE BOX
SUBJECTIVE / OVERNIGHT EVENTS:pt seen and examined  02-11-23    MEDICATIONS  (STANDING):  aMIOdarone    Tablet 200 milliGRAM(s) Oral daily  budesonide 160 MICROgram(s)/formoterol 4.5 MICROgram(s) Inhaler 2 Puff(s) Inhalation two times a day  buMETAnide 2 milliGRAM(s) Oral daily  dextrose 5%. 1000 milliLiter(s) (50 mL/Hr) IV Continuous <Continuous>  dextrose 5%. 1000 milliLiter(s) (100 mL/Hr) IV Continuous <Continuous>  dextrose 50% Injectable 25 Gram(s) IV Push once  dextrose 50% Injectable 12.5 Gram(s) IV Push once  dextrose 50% Injectable 25 Gram(s) IV Push once  ferrous    sulfate 325 milliGRAM(s) Oral daily  glucagon  Injectable 1 milliGRAM(s) IntraMuscular once  heparin  Infusion.  Unit(s)/Hr (19 mL/Hr) IV Continuous <Continuous>  hydrALAZINE 50 milliGRAM(s) Oral three times a day  insulin detemir injectable (LEVEMIR) 25 Unit(s) SubCutaneous at bedtime  insulin lispro (ADMELOG) corrective regimen sliding scale   SubCutaneous three times a day before meals  insulin lispro (ADMELOG) corrective regimen sliding scale   SubCutaneous at bedtime  insulin lispro Injectable (ADMELOG) 5 Unit(s) SubCutaneous three times a day before meals  isosorbide   mononitrate ER Tablet (IMDUR) 30 milliGRAM(s) Oral daily  pantoprazole    Tablet 40 milliGRAM(s) Oral before breakfast  petrolatum white Ointment 1 Application(s) Topical every 6 hours  predniSONE   Tablet 5 milliGRAM(s) Oral daily  simvastatin 10 milliGRAM(s) Oral at bedtime  sodium chloride 0.65% Nasal 1 Spray(s) Both Nostrils every 4 hours  sodium chloride 0.9%. 1000 milliLiter(s) (50 mL/Hr) IV Continuous <Continuous>    MEDICATIONS  (PRN):  acetaminophen     Tablet .. 650 milliGRAM(s) Oral every 6 hours PRN Temp greater or equal to 38C (100.4F), Mild Pain (1 - 3)  albuterol    90 MICROgram(s) HFA Inhaler 2 Puff(s) Inhalation every 6 hours PRN Bronchospasm  aluminum hydroxide/magnesium hydroxide/simethicone Suspension 30 milliLiter(s) Oral every 4 hours PRN Dyspepsia  dextrose Oral Gel 15 Gram(s) Oral once PRN Blood Glucose LESS THAN 70 milliGRAM(s)/deciliter  heparin   Injectable 9000 Unit(s) IV Push every 6 hours PRN For aPTT less than 40  heparin   Injectable 4000 Unit(s) IV Push every 6 hours PRN For aPTT between 40 - 57  lidocaine   4% Patch 1 Patch Transdermal daily PRN pain  melatonin 3 milliGRAM(s) Oral at bedtime PRN Insomnia  ondansetron Injectable 4 milliGRAM(s) IV Push every 8 hours PRN Nausea and/or Vomiting  oxymetazoline 0.05% Nasal Spray 2 Spray(s) Both Nostrils every 12 hours PRN Nose bleeds    Vital Signs Last 24 Hrs  T(C): 36.7 (02-11-23 @ 21:45), Max: 36.7 (02-11-23 @ 06:39)  T(F): 98 (02-11-23 @ 21:45), Max: 98 (02-11-23 @ 06:39)  HR: 62 (02-11-23 @ 21:45) (62 - 72)  BP: 119/89 (02-11-23 @ 21:45) (119/77 - 122/50)  BP(mean): --  RR: 19 (02-11-23 @ 21:45) (18 - 19)  SpO2: 98% (02-11-23 @ 21:45) (98% - 99%)              Constitutional: No fever, fatigue  Skin: No rash.  Eyes: No recent vision problems or eye pain.  ENT: No congestion, ear pain, or sore throat.  Cardiovascular: No chest pain or palpation.  Respiratory: No cough, shortness of breath, congestion, or wheezing.  Gastrointestinal: No abdominal pain, nausea, vomiting, or diarrhea.  Genitourinary: No dysuria.  Musculoskeletal: No joint swelling.  Neurologic: No headache.    PHYSICAL EXAM:  GENERAL: NAD  EYES: EOMI, PERRLA  NECK: Supple, No JVD  CHEST/LUNG: dec breath sounds at bases  HEART:  S1 , S2 +  ABDOMEN: soft, bs+  EXTREMITIES:  edema+  NEUROLOGY:alert awake oriented    LABS:  02-11    138  |  94<L>  |  77<H>  ----------------------------<  135<H>  4.5   |  34<H>  |  2.60<H>    Ca    10.1      11 Feb 2023 06:53  Phos  3.6     02-11  Mg     2.20     02-11      Creatinine Trend: 2.60 <--, 2.64 <--, 2.72 <--, 2.28 <--, 2.18 <--, 1.94 <--                        8.4    4.81  )-----------( 313      ( 11 Feb 2023 06:53 )             29.1     Urine Studies:              PT/INR - ( 11 Feb 2023 06:53 )   PT: 24.1 sec;   INR: 2.06 ratio         PTT - ( 11 Feb 2023 06:53 )  PTT:75.9 sec    Imaging Personally Reviewed:yes    Consultant(s) Notes Reviewed:  yes    Care Discussed with Consultants/Other Providers:yes

## 2023-02-12 ENCOUNTER — TRANSCRIPTION ENCOUNTER (OUTPATIENT)
Age: 73
End: 2023-02-12

## 2023-02-12 LAB
ANION GAP SERPL CALC-SCNC: 9 MMOL/L — SIGNIFICANT CHANGE UP (ref 7–14)
APTT BLD: 87.9 SEC — HIGH (ref 27–36.3)
BUN SERPL-MCNC: 74 MG/DL — HIGH (ref 7–23)
CALCIUM SERPL-MCNC: 9.8 MG/DL — SIGNIFICANT CHANGE UP (ref 8.4–10.5)
CHLORIDE SERPL-SCNC: 93 MMOL/L — LOW (ref 98–107)
CO2 SERPL-SCNC: 33 MMOL/L — HIGH (ref 22–31)
CREAT SERPL-MCNC: 2.54 MG/DL — HIGH (ref 0.5–1.3)
EGFR: 19 ML/MIN/1.73M2 — LOW
GLUCOSE BLDC GLUCOMTR-MCNC: 119 MG/DL — HIGH (ref 70–99)
GLUCOSE BLDC GLUCOMTR-MCNC: 124 MG/DL — HIGH (ref 70–99)
GLUCOSE BLDC GLUCOMTR-MCNC: 149 MG/DL — HIGH (ref 70–99)
GLUCOSE BLDC GLUCOMTR-MCNC: 215 MG/DL — HIGH (ref 70–99)
GLUCOSE SERPL-MCNC: 102 MG/DL — HIGH (ref 70–99)
HCT VFR BLD CALC: 27.1 % — LOW (ref 34.5–45)
HGB BLD-MCNC: 7.7 G/DL — LOW (ref 11.5–15.5)
INR BLD: 2.55 RATIO — HIGH (ref 0.88–1.16)
MAGNESIUM SERPL-MCNC: 2.2 MG/DL — SIGNIFICANT CHANGE UP (ref 1.6–2.6)
MCHC RBC-ENTMCNC: 28.4 GM/DL — LOW (ref 32–36)
MCHC RBC-ENTMCNC: 29.5 PG — SIGNIFICANT CHANGE UP (ref 27–34)
MCV RBC AUTO: 103.8 FL — HIGH (ref 80–100)
NRBC # BLD: 0 /100 WBCS — SIGNIFICANT CHANGE UP (ref 0–0)
NRBC # FLD: 0 K/UL — SIGNIFICANT CHANGE UP (ref 0–0)
PHOSPHATE SERPL-MCNC: 3.8 MG/DL — SIGNIFICANT CHANGE UP (ref 2.5–4.5)
PLATELET # BLD AUTO: 302 K/UL — SIGNIFICANT CHANGE UP (ref 150–400)
POTASSIUM SERPL-MCNC: 4.7 MMOL/L — SIGNIFICANT CHANGE UP (ref 3.5–5.3)
POTASSIUM SERPL-SCNC: 4.7 MMOL/L — SIGNIFICANT CHANGE UP (ref 3.5–5.3)
PROTHROM AB SERPL-ACNC: 29.9 SEC — HIGH (ref 10.5–13.4)
RBC # BLD: 2.61 M/UL — LOW (ref 3.8–5.2)
RBC # FLD: 14.6 % — HIGH (ref 10.3–14.5)
SODIUM SERPL-SCNC: 135 MMOL/L — SIGNIFICANT CHANGE UP (ref 135–145)
WBC # BLD: 5.05 K/UL — SIGNIFICANT CHANGE UP (ref 3.8–10.5)
WBC # FLD AUTO: 5.05 K/UL — SIGNIFICANT CHANGE UP (ref 3.8–10.5)

## 2023-02-12 RX ORDER — WARFARIN SODIUM 2.5 MG/1
5 TABLET ORAL ONCE
Refills: 0 | Status: COMPLETED | OUTPATIENT
Start: 2023-02-12 | End: 2023-02-12

## 2023-02-12 RX ADMIN — Medication 5 UNIT(S): at 17:48

## 2023-02-12 RX ADMIN — Medication 1 APPLICATION(S): at 18:01

## 2023-02-12 RX ADMIN — Medication 1 SPRAY(S): at 02:30

## 2023-02-12 RX ADMIN — BUMETANIDE 2 MILLIGRAM(S): 0.25 INJECTION INTRAMUSCULAR; INTRAVENOUS at 06:03

## 2023-02-12 RX ADMIN — AMIODARONE HYDROCHLORIDE 200 MILLIGRAM(S): 400 TABLET ORAL at 06:03

## 2023-02-12 RX ADMIN — Medication 1 SPRAY(S): at 06:03

## 2023-02-12 RX ADMIN — SIMVASTATIN 10 MILLIGRAM(S): 20 TABLET, FILM COATED ORAL at 23:09

## 2023-02-12 RX ADMIN — ISOSORBIDE MONONITRATE 30 MILLIGRAM(S): 60 TABLET, EXTENDED RELEASE ORAL at 12:59

## 2023-02-12 RX ADMIN — Medication 1 SPRAY(S): at 09:17

## 2023-02-12 RX ADMIN — Medication 3 MILLIGRAM(S): at 02:37

## 2023-02-12 RX ADMIN — Medication 50 MILLIGRAM(S): at 06:03

## 2023-02-12 RX ADMIN — LIDOCAINE 1 PATCH: 4 CREAM TOPICAL at 23:08

## 2023-02-12 RX ADMIN — Medication 1 SPRAY(S): at 13:02

## 2023-02-12 RX ADMIN — BUDESONIDE AND FORMOTEROL FUMARATE DIHYDRATE 2 PUFF(S): 160; 4.5 AEROSOL RESPIRATORY (INHALATION) at 09:16

## 2023-02-12 RX ADMIN — Medication 25 UNIT(S): at 23:10

## 2023-02-12 RX ADMIN — Medication 1 APPLICATION(S): at 06:04

## 2023-02-12 RX ADMIN — Medication 5 UNIT(S): at 12:58

## 2023-02-12 RX ADMIN — Medication 1 APPLICATION(S): at 12:02

## 2023-02-12 RX ADMIN — Medication 1 APPLICATION(S): at 00:10

## 2023-02-12 RX ADMIN — LIDOCAINE 1 PATCH: 4 CREAM TOPICAL at 08:18

## 2023-02-12 RX ADMIN — Medication 650 MILLIGRAM(S): at 23:19

## 2023-02-12 RX ADMIN — WARFARIN SODIUM 5 MILLIGRAM(S): 2.5 TABLET ORAL at 23:12

## 2023-02-12 RX ADMIN — Medication 5 UNIT(S): at 09:16

## 2023-02-12 RX ADMIN — Medication 1 SPRAY(S): at 17:50

## 2023-02-12 RX ADMIN — Medication 50 MILLIGRAM(S): at 23:09

## 2023-02-12 RX ADMIN — Medication 50 MILLIGRAM(S): at 17:49

## 2023-02-12 RX ADMIN — BUDESONIDE AND FORMOTEROL FUMARATE DIHYDRATE 2 PUFF(S): 160; 4.5 AEROSOL RESPIRATORY (INHALATION) at 23:11

## 2023-02-12 RX ADMIN — Medication 1 SPRAY(S): at 23:11

## 2023-02-12 RX ADMIN — Medication 325 MILLIGRAM(S): at 12:59

## 2023-02-12 RX ADMIN — Medication 5 MILLIGRAM(S): at 06:03

## 2023-02-12 RX ADMIN — Medication 2: at 12:58

## 2023-02-12 NOTE — DISCHARGE NOTE PROVIDER - NSDCFUADDAPPT_GEN_ALL_CORE_FT
Please follow up with your primary care provider in 1-2 weeks following discharge from the hospital for continued monitoring and management. Please follow up with your cardiologist and nephrologist for continued monitoring and management.     Please re-check your INR on 3/9 and re-dose warfarin as needed

## 2023-02-12 NOTE — DISCHARGE NOTE PROVIDER - NSDCCPCAREPLAN_GEN_ALL_CORE_FT
PRINCIPAL DISCHARGE DIAGNOSIS  Diagnosis: Epistaxis  Assessment and Plan of Treatment: You were admitted for a nose bleed, which has since resolved. Please continue your current medication regiment and follow up with your primary care provider for continued monitoring and care.      SECONDARY DISCHARGE DIAGNOSES  Diagnosis: Anemia  Assessment and Plan of Treatment: Please continue your current medication regiment and follow up with your primary care provider for continued monitoring and care.    Diagnosis: Chronic atrial fibrillation  Assessment and Plan of Treatment: Please continue your current medication regiment and follow up with your primary care provider for continued monitoring and care. p[moniquease take warfarin 3mg daily and recheck your INR on 3/9    Diagnosis: CHF (congestive heart failure)  Assessment and Plan of Treatment: Please continue your current medication regiment and follow up with your primary care provider for continued monitoring and care.    Diagnosis: COPD, severe  Assessment and Plan of Treatment: Please continue your current medication regiment and follow up with your primary care provider for continued monitoring and care. You are to have home BiPAP set up wiht the hospice respiratory therapist    Diagnosis: Type 2 diabetes mellitus  Assessment and Plan of Treatment: Continue your medication regimen and a consistent carbohydrate diet (Meaning eating the same amount of carbohydrates at the same time each day). Monitor blood glucose levels throughout the day before meals and at bedtime. Record blood sugars and bring to outpatient providers appointment in order to be reviewed by your doctor for management modifications. Monitor for signs/symptoms of low blood glucose, such as, dizziness, altered mental status, or cool/clammy skin. In addition, monitor for signs/symptoms of high blood glucose, such as, feeling hot, dry, fatigued, or with increased thirst/urination. Make regular podiatry appointments in order to have feet checked for wounds and uncontrolled toe nail growth to prevent infections, as well as, appointments with an ophthalmologist to monitor your vision.    Diagnosis: Hypertension  Assessment and Plan of Treatment: Continue blood pressure medication regimen as directed. Monitor for any visual changes, headaches or dizziness.  Monitor blood pressure regularly.  Follow up with your PCP for further management for high blood pressure.

## 2023-02-12 NOTE — DISCHARGE NOTE PROVIDER - NSDCMRMEDTOKEN_GEN_ALL_CORE_FT
amiodarone 200 mg oral tablet: 1 tab(s) orally once a day MDD:1  budesonide-formoterol 160 mcg-4.5 mcg/inh inhalation aerosol: 2 puff(s) inhaled 2 times a day   bumetanide 2 mg oral tablet: 1 tab(s) orally once a day   ferrous sulfate 324 mg (65 mg elemental iron) oral tablet: 1 tab(s) orally once a day  hydrALAZINE 50 mg oral tablet: 1 tab(s) orally 3 times a day  isosorbide mononitrate 30 mg oral tablet, extended release: 1 tab(s) orally once a day MDD:1  Levemir FlexTouch 100 units/mL subcutaneous solution: 25 unit(s) subcutaneous once a day (at bedtime)   NovoLOG FlexPen 100 units/mL injectable solution: 10 unit(s) injectable 3 times a day (before meals)   predniSONE 5 mg oral tablet: 1 tab(s) orally once a day MDD:1  ProAir HFA 90 mcg/inh inhalation aerosol: 2 puff(s) inhaled every 6 hours  Protonix 40 mg oral delayed release tablet: 1 tab(s) orally once a day   simvastatin 10 mg oral tablet: 1 tab(s) orally once a day (at bedtime)  warfarin 5 mg oral tablet: 1 tab(s) orally once a day    acetaminophen 325 mg oral tablet: 2 tab(s) orally every 6 hours, As needed, Temp greater or equal to 38C (100.4F), Mild Pain (1 - 3), Moderate Pain (4 - 6)  aluminum hydroxide-magnesium hydroxide 200 mg-200 mg/5 mL oral suspension: 30 milliliter(s) orally every 4 hours, As needed, Dyspepsia  amiodarone 200 mg oral tablet: 1 tab(s) orally once a day MDD:1  budesonide-formoterol 160 mcg-4.5 mcg/inh inhalation aerosol: 2 puff(s) inhaled 2 times a day   ferrous sulfate 324 mg (65 mg elemental iron) oral tablet: 1 tab(s) orally once a day  hydrALAZINE 50 mg oral tablet: 1 tab(s) orally 3 times a day  isosorbide mononitrate 30 mg oral tablet, extended release: 1 tab(s) orally once a day MDD:1  Levemir FlexTouch 100 units/mL subcutaneous solution: 25 unit(s) subcutaneous once a day (at bedtime)   lidocaine 4% topical film: Apply topically to affected area once a day  polyethylene glycol 3350 oral powder for reconstitution: 17 gram(s) orally once a day  predniSONE 5 mg oral tablet: 1 tab(s) orally once a day MDD:1  ProAir HFA 90 mcg/inh inhalation aerosol: 2 puff(s) inhaled every 6 hours  Protonix 40 mg oral delayed release tablet: 1 tab(s) orally once a day   senna leaf extract oral tablet: 2 tab(s) orally once a day (at bedtime)  simvastatin 10 mg oral tablet: 1 tab(s) orally once a day (at bedtime)   acetaminophen 325 mg oral tablet: 2 tab(s) orally every 6 hours, As needed, Temp greater or equal to 38C (100.4F), Mild Pain (1 - 3), Moderate Pain (4 - 6)  aluminum hydroxide-magnesium hydroxide 200 mg-200 mg/5 mL oral suspension: 30 milliliter(s) orally every 4 hours, As needed, Dyspepsia  amiodarone 200 mg oral tablet: 1 tab(s) orally once a day MDD:1  budesonide-formoterol 160 mcg-4.5 mcg/inh inhalation aerosol: 2 puff(s) inhaled 2 times a day   ferrous sulfate 324 mg (65 mg elemental iron) oral tablet: 1 tab(s) orally once a day  hydrALAZINE 50 mg oral tablet: 1 tab(s) orally 3 times a day  isosorbide mononitrate 30 mg oral tablet, extended release: 1 tab(s) orally once a day MDD:1  Levemir FlexTouch 100 units/mL subcutaneous solution: 20 unit(s) subcutaneous once a day (at bedtime)   lidocaine 4% topical film: Apply topically to affected area once a day  NovoLOG FlexPen 100 units/mL injectable solution: 5 unit(s) injectable 3 times a day (before meals)   polyethylene glycol 3350 oral powder for reconstitution: 17 gram(s) orally once a day  predniSONE 5 mg oral tablet: 1 tab(s) orally once a day MDD:1  ProAir HFA 90 mcg/inh inhalation aerosol: 2 puff(s) inhaled every 6 hours  Protonix 40 mg oral delayed release tablet: 1 tab(s) orally once a day   senna leaf extract oral tablet: 2 tab(s) orally once a day (at bedtime)  simvastatin 10 mg oral tablet: 1 tab(s) orally once a day (at bedtime)  warfarin 1 mg oral tablet: 3 tab(s) orally once a day - check INR on 3/9

## 2023-02-12 NOTE — DISCHARGE NOTE PROVIDER - HOSPITAL COURSE
Rescheduled for tomorrow 71 yo Female w/ PMHx of Gout, COPD (on 3L O2 at home), HTN, DM, CHF, CKD, atrial fibrillation, pulmonary HTN, Kirsten and tricuspid valve replacement (on warfarin) presents to ED for evaluation of epistaxis. Admitted to medicine for further evaluation.     Epistaxis  - Resolved, CT Head 2/16 no acute abnormalities    CHF  - Patient currently euvolemic  - LE edema -Improving, s/p Bumex 2 mg IV twice daily, changed to Bumex 2mg po BID on 2/20 (home dose), then Bumex drip-->d/c 2/25 now on PO torsemide 40mg qd  - Pt with elevated serum bicarbonate but with mixed disorder (respiratory acidosis and metabolic alkalosis with overall acidemia on blood gas)  - Prior TTE with mild to moderate global LVSD. Monitor UO  - 2/22 CT Chest/Abd/Pelvis - pulm edema L>R; repeat echo 2/23: difficult study, mild aoritc stenosis, endocardium not well visualized, grossly normal LV systolic fx.    - 2/14 Repeat TTE - with normal LVSF  - 2/4 CXR - Stable pulmonary edema and left pleural effusion.  - ___?____ torsemide 40 mg PO daily given advanced renal failure     History of mitral valve replacement with mechanical valve  - Initially with subtherapeutic INR  - s/p heparin ggt to coumadin bridge , patient then supratherapeutic.   - s/p vitamin K 2.5mg PO x1 - Coumadin held 3/4 + 3/5   - Currently therapeutic - DC on 3mg dose per attending - recheck INR in 48 hours.     COPD, severe  - as per pulm , cont bronchodilators  - BPAP at night, on Prednisone 5mg po qd    - taper O2   - Pulm c/s Dr. Merida following - outpatient sleep study/ sleep eval  - To be set up with home BiPAP with respiratory therapy     MISAEL on CKD  - Bun/Cr: 114/3.65  - Nephro Dr Perez - likely CRS. SCr relatively stable  - per Nephro KAYLAN salguero. FeUrea low. Bladder scan negative. Avoid nephrotoxins  - Scr increasing for which Bumex discontinued on 2/25  - CT with no mention of hydronephrosis    Dispo: Home hospice     On 3/6/23, discussed with Dr. Newman, patient is medically cleared and optimized for discharge today. All medications were reviewed with attending, and sent to mutually agreed upon pharmacy.  Reviewed discharge medications with patient; All new medications requiring new prescription sent to pharmacy of patients choice. Reviewed need for prescription for previous home medications and new prescriptions sent if requested. Patient in agreement and understands.      71 yo Female w/ PMHx of Gout, COPD (on 3L O2 at home), HTN, DM, CHF, CKD, atrial fibrillation, pulmonary HTN, Kirsten and tricuspid valve replacement (on warfarin) presents to ED for evaluation of epistaxis. Admitted to medicine for further evaluation.     Epistaxis  - Resolved, CT Head 2/16 no acute abnormalities    CHF  - Patient currently euvolemic  - LE edema -Improving, s/p Bumex 2 mg IV twice daily, changed to Bumex 2mg po BID on 2/20 (home dose), then Bumex drip-->d/c 2/25 now on PO torsemide 40mg qd  - Pt with elevated serum bicarbonate but with mixed disorder (respiratory acidosis and metabolic alkalosis with overall acidemia on blood gas)  - Prior TTE with mild to moderate global LVSD. Monitor UO  - 2/22 CT Chest/Abd/Pelvis - pulm edema L>R; repeat echo 2/23: difficult study, mild aoritc stenosis, endocardium not well visualized, grossly normal LV systolic fx.    - 2/14 Repeat TTE - with normal LVSF  - 2/4 CXR - Stable pulmonary edema and left pleural effusion.  - Torsemide 40 mg PO daily held in setting of  advanced renal failure - resume on discharge     History of mitral valve replacement with mechanical valve  - Initially with subtherapeutic INR  - s/p heparin ggt to coumadin bridge , patient then supratherapeutic.   - s/p vitamin K 2.5mg PO x1 - Coumadin held 3/4 + 3/5   - Currently therapeutic - DC on 3mg dose per attending - recheck INR in 48 hours.     COPD, severe  - as per pulm , cont bronchodilators  - BPAP at night, on Prednisone 5mg po qd    - taper O2   - Pulm c/s Dr. Merida following - outpatient sleep study/ sleep eval  - To be set up with home BiPAP with respiratory therapy     MISAEL on CKD  - Bun/Cr: 114/3.65  - Nephro Dr Perez - likely CRS. SCr relatively stable  - per Nephro KAYLAN salguero. FeUrea low. Bladder scan negative. Avoid nephrotoxins  - Scr increasing for which Bumex discontinued on 2/25  - CT with no mention of hydronephrosis    Dispo: Home hospice     On 3/6/23, discussed with Dr. Newman, patient is medically cleared and optimized for discharge today. All medications were reviewed with attending, and sent to mutually agreed upon pharmacy.  Reviewed discharge medications with patient; All new medications requiring new prescription sent to pharmacy of patients choice. Reviewed need for prescription for previous home medications and new prescriptions sent if requested. Patient in agreement and understands.

## 2023-02-12 NOTE — PROGRESS NOTE ADULT - SUBJECTIVE AND OBJECTIVE BOX
SUBJECTIVE / OVERNIGHT EVENTS:pt seen and examined  02-12-23    MEDICATIONS  (STANDING):  aMIOdarone    Tablet 200 milliGRAM(s) Oral daily  budesonide 160 MICROgram(s)/formoterol 4.5 MICROgram(s) Inhaler 2 Puff(s) Inhalation two times a day  buMETAnide 2 milliGRAM(s) Oral daily  dextrose 5%. 1000 milliLiter(s) (100 mL/Hr) IV Continuous <Continuous>  dextrose 5%. 1000 milliLiter(s) (50 mL/Hr) IV Continuous <Continuous>  dextrose 50% Injectable 25 Gram(s) IV Push once  dextrose 50% Injectable 12.5 Gram(s) IV Push once  dextrose 50% Injectable 25 Gram(s) IV Push once  ferrous    sulfate 325 milliGRAM(s) Oral daily  glucagon  Injectable 1 milliGRAM(s) IntraMuscular once  hydrALAZINE 50 milliGRAM(s) Oral three times a day  insulin detemir injectable (LEVEMIR) 25 Unit(s) SubCutaneous at bedtime  insulin lispro (ADMELOG) corrective regimen sliding scale   SubCutaneous at bedtime  insulin lispro (ADMELOG) corrective regimen sliding scale   SubCutaneous three times a day before meals  insulin lispro Injectable (ADMELOG) 5 Unit(s) SubCutaneous three times a day before meals  isosorbide   mononitrate ER Tablet (IMDUR) 30 milliGRAM(s) Oral daily  pantoprazole    Tablet 40 milliGRAM(s) Oral before breakfast  petrolatum white Ointment 1 Application(s) Topical every 6 hours  predniSONE   Tablet 5 milliGRAM(s) Oral daily  simvastatin 10 milliGRAM(s) Oral at bedtime  sodium chloride 0.65% Nasal 1 Spray(s) Both Nostrils every 4 hours  sodium chloride 0.9%. 1000 milliLiter(s) (50 mL/Hr) IV Continuous <Continuous>  warfarin 5 milliGRAM(s) Oral once    MEDICATIONS  (PRN):  acetaminophen     Tablet .. 650 milliGRAM(s) Oral every 6 hours PRN Temp greater or equal to 38C (100.4F), Mild Pain (1 - 3)  albuterol    90 MICROgram(s) HFA Inhaler 2 Puff(s) Inhalation every 6 hours PRN Bronchospasm  aluminum hydroxide/magnesium hydroxide/simethicone Suspension 30 milliLiter(s) Oral every 4 hours PRN Dyspepsia  dextrose Oral Gel 15 Gram(s) Oral once PRN Blood Glucose LESS THAN 70 milliGRAM(s)/deciliter  lidocaine   4% Patch 1 Patch Transdermal daily PRN pain  melatonin 3 milliGRAM(s) Oral at bedtime PRN Insomnia  ondansetron Injectable 4 milliGRAM(s) IV Push every 8 hours PRN Nausea and/or Vomiting  oxymetazoline 0.05% Nasal Spray 2 Spray(s) Both Nostrils every 12 hours PRN Nose bleeds  \Vital Signs Last 24 Hrs  T(C): 36.7 (02-12-23 @ 17:58), Max: 36.7 (02-12-23 @ 17:58)  T(F): 98.1 (02-12-23 @ 17:58), Max: 98.1 (02-12-23 @ 17:58)  HR: 63 (02-12-23 @ 17:58) (63 - 73)  BP: 131/66 (02-12-23 @ 17:58) (120/55 - 131/66)  BP(mean): --  RR: 18 (02-12-23 @ 17:58) (18 - 19)  SpO2: 100% (02-12-23 @ 17:58) (98% - 100%)                Constitutional: No fever, fatigue  Skin: No rash.  Eyes: No recent vision problems or eye pain.  ENT: No congestion, ear pain, or sore throat.  Cardiovascular: No chest pain or palpation.  Respiratory: No cough, shortness of breath, congestion, or wheezing.  Gastrointestinal: No abdominal pain, nausea, vomiting, or diarrhea.  Genitourinary: No dysuria.  Musculoskeletal: No joint swelling.  Neurologic: No headache.    PHYSICAL EXAM:  GENERAL: NAD  EYES: EOMI, PERRLA  NECK: Supple, No JVD  CHEST/LUNG: dec breath sounds at bases  HEART:  S1 , S2 +  ABDOMEN: soft, bs+  EXTREMITIES:  edema+  NEUROLOGY:alert awake oriented    LABS:  02-12    135  |  93<L>  |  74<H>  ----------------------------<  102<H>  4.7   |  33<H>  |  2.54<H>    Ca    9.8      12 Feb 2023 05:51  Phos  3.8     02-12  Mg     2.20     02-12      Creatinine Trend: 2.54 <--, 2.60 <--, 2.64 <--, 2.72 <--, 2.28 <--, 2.18 <--, 1.94 <--                        7.7    5.05  )-----------( 302      ( 12 Feb 2023 05:51 )             27.1     Urine Studies:              PT/INR - ( 12 Feb 2023 05:51 )   PT: 29.9 sec;   INR: 2.55 ratio         PTT - ( 12 Feb 2023 05:51 )  PTT:87.9 sec     PTT - ( 11 Feb 2023 06:53 )  PTT:75.9 sec    Imaging Personally Reviewed:yes    Consultant(s) Notes Reviewed:  yes    Care Discussed with Consultants/Other Providers:yes

## 2023-02-12 NOTE — DISCHARGE NOTE PROVIDER - DETAILS OF MALNUTRITION DIAGNOSIS/DIAGNOSES
This patient has been assessed with a concern for Malnutrition and was treated during this hospitalization for the following Nutrition diagnosis/diagnoses:     -  03/03/2023: Severe protein-calorie malnutrition   -  03/03/2023: Morbid obesity (BMI > 40)   -  02/14/2023: Mild protein-calorie malnutrition   -  02/14/2023: Morbid obesity (BMI > 40)

## 2023-02-12 NOTE — DISCHARGE NOTE PROVIDER - DISCHARGE DIET
Regular Diet - No restrictions/DASH Diet/Soft and Bite-Sized Diet/Renal Diets (for dialysis)/Other Diet Instructions

## 2023-02-12 NOTE — DISCHARGE NOTE PROVIDER - CARE PROVIDER_API CALL
Gerald Manning  CARDIOVASCULAR DISEASE  [id number] [place] [address]  Phone: [phone]  Fax: [phone]  Follow Up Time:

## 2023-02-13 LAB
ANION GAP SERPL CALC-SCNC: 8 MMOL/L — SIGNIFICANT CHANGE UP (ref 7–14)
ANION GAP SERPL CALC-SCNC: 9 MMOL/L — SIGNIFICANT CHANGE UP (ref 7–14)
APTT BLD: 45.7 SEC — HIGH (ref 27–36.3)
BASE EXCESS BLDV CALC-SCNC: 11.2 MMOL/L — HIGH (ref -2–3)
BASE EXCESS BLDV CALC-SCNC: 12.8 MMOL/L — HIGH (ref -2–3)
BLOOD GAS VENOUS COMPREHENSIVE RESULT: SIGNIFICANT CHANGE UP
BLOOD GAS VENOUS COMPREHENSIVE RESULT: SIGNIFICANT CHANGE UP
BUN SERPL-MCNC: 82 MG/DL — HIGH (ref 7–23)
BUN SERPL-MCNC: 83 MG/DL — HIGH (ref 7–23)
CALCIUM SERPL-MCNC: 10 MG/DL — SIGNIFICANT CHANGE UP (ref 8.4–10.5)
CALCIUM SERPL-MCNC: 9.7 MG/DL — SIGNIFICANT CHANGE UP (ref 8.4–10.5)
CHLORIDE BLDV-SCNC: 96 MMOL/L — SIGNIFICANT CHANGE UP (ref 96–108)
CHLORIDE BLDV-SCNC: 96 MMOL/L — SIGNIFICANT CHANGE UP (ref 96–108)
CHLORIDE SERPL-SCNC: 92 MMOL/L — LOW (ref 98–107)
CHLORIDE SERPL-SCNC: 93 MMOL/L — LOW (ref 98–107)
CK MB BLD-MCNC: 7.2 % — HIGH (ref 0–2.5)
CK MB BLD-MCNC: 7.2 % — HIGH (ref 0–2.5)
CK MB CFR SERPL CALC: 2.1 NG/ML — SIGNIFICANT CHANGE UP
CK MB CFR SERPL CALC: 2.1 NG/ML — SIGNIFICANT CHANGE UP
CK SERPL-CCNC: 29 U/L — SIGNIFICANT CHANGE UP (ref 25–170)
CK SERPL-CCNC: 29 U/L — SIGNIFICANT CHANGE UP (ref 25–170)
CO2 BLDV-SCNC: 39.9 MMOL/L — HIGH (ref 22–26)
CO2 BLDV-SCNC: 42.3 MMOL/L — HIGH (ref 22–26)
CO2 SERPL-SCNC: 34 MMOL/L — HIGH (ref 22–31)
CO2 SERPL-SCNC: 34 MMOL/L — HIGH (ref 22–31)
CREAT SERPL-MCNC: 2.56 MG/DL — HIGH (ref 0.5–1.3)
CREAT SERPL-MCNC: 2.73 MG/DL — HIGH (ref 0.5–1.3)
EGFR: 18 ML/MIN/1.73M2 — LOW
EGFR: 19 ML/MIN/1.73M2 — LOW
GAS PNL BLDV: 134 MMOL/L — LOW (ref 136–145)
GAS PNL BLDV: 135 MMOL/L — LOW (ref 136–145)
GLUCOSE BLDC GLUCOMTR-MCNC: 111 MG/DL — HIGH (ref 70–99)
GLUCOSE BLDC GLUCOMTR-MCNC: 148 MG/DL — HIGH (ref 70–99)
GLUCOSE BLDC GLUCOMTR-MCNC: 155 MG/DL — HIGH (ref 70–99)
GLUCOSE BLDC GLUCOMTR-MCNC: 92 MG/DL — SIGNIFICANT CHANGE UP (ref 70–99)
GLUCOSE BLDV-MCNC: 119 MG/DL — HIGH (ref 70–99)
GLUCOSE BLDV-MCNC: 208 MG/DL — HIGH (ref 70–99)
GLUCOSE SERPL-MCNC: 105 MG/DL — HIGH (ref 70–99)
GLUCOSE SERPL-MCNC: 122 MG/DL — HIGH (ref 70–99)
HCO3 BLDV-SCNC: 38 MMOL/L — HIGH (ref 22–29)
HCO3 BLDV-SCNC: 40 MMOL/L — HIGH (ref 22–29)
HCT VFR BLD CALC: 27.8 % — LOW (ref 34.5–45)
HCT VFR BLDA CALC: 25 % — LOW (ref 34.5–46.5)
HCT VFR BLDA CALC: 26 % — LOW (ref 34.5–46.5)
HGB BLD CALC-MCNC: 8.3 G/DL — LOW (ref 11.7–16.1)
HGB BLD CALC-MCNC: 8.7 G/DL — LOW (ref 11.7–16.1)
HGB BLD-MCNC: 7.8 G/DL — LOW (ref 11.5–15.5)
INR BLD: 2.59 RATIO — HIGH (ref 0.88–1.16)
LACTATE BLDV-MCNC: 0.9 MMOL/L — SIGNIFICANT CHANGE UP (ref 0.5–2)
LACTATE BLDV-MCNC: 0.9 MMOL/L — SIGNIFICANT CHANGE UP (ref 0.5–2)
MAGNESIUM SERPL-MCNC: 2.3 MG/DL — SIGNIFICANT CHANGE UP (ref 1.6–2.6)
MCHC RBC-ENTMCNC: 28.1 GM/DL — LOW (ref 32–36)
MCHC RBC-ENTMCNC: 29.5 PG — SIGNIFICANT CHANGE UP (ref 27–34)
MCV RBC AUTO: 105.3 FL — HIGH (ref 80–100)
NRBC # BLD: 0 /100 WBCS — SIGNIFICANT CHANGE UP (ref 0–0)
NRBC # FLD: 0.02 K/UL — HIGH (ref 0–0)
PCO2 BLDV: 64 MMHG — HIGH (ref 39–52)
PCO2 BLDV: 71 MMHG — HIGH (ref 39–52)
PH BLDV: 7.36 — SIGNIFICANT CHANGE UP (ref 7.32–7.43)
PH BLDV: 7.38 — SIGNIFICANT CHANGE UP (ref 7.32–7.43)
PHOSPHATE SERPL-MCNC: 4.8 MG/DL — HIGH (ref 2.5–4.5)
PLATELET # BLD AUTO: 329 K/UL — SIGNIFICANT CHANGE UP (ref 150–400)
PO2 BLDV: 56 MMHG — HIGH (ref 25–45)
PO2 BLDV: 59 MMHG — HIGH (ref 25–45)
POTASSIUM BLDV-SCNC: 5.5 MMOL/L — HIGH (ref 3.5–5.1)
POTASSIUM BLDV-SCNC: 5.6 MMOL/L — HIGH (ref 3.5–5.1)
POTASSIUM SERPL-MCNC: 4.9 MMOL/L — SIGNIFICANT CHANGE UP (ref 3.5–5.3)
POTASSIUM SERPL-MCNC: 5.1 MMOL/L — SIGNIFICANT CHANGE UP (ref 3.5–5.3)
POTASSIUM SERPL-SCNC: 4.9 MMOL/L — SIGNIFICANT CHANGE UP (ref 3.5–5.3)
POTASSIUM SERPL-SCNC: 5.1 MMOL/L — SIGNIFICANT CHANGE UP (ref 3.5–5.3)
PROTHROM AB SERPL-ACNC: 30.3 SEC — HIGH (ref 10.5–13.4)
RBC # BLD: 2.64 M/UL — LOW (ref 3.8–5.2)
RBC # FLD: 14.6 % — HIGH (ref 10.3–14.5)
SAO2 % BLDV: 87.9 % — SIGNIFICANT CHANGE UP (ref 67–88)
SAO2 % BLDV: 91.8 % — HIGH (ref 67–88)
SODIUM SERPL-SCNC: 134 MMOL/L — LOW (ref 135–145)
SODIUM SERPL-SCNC: 136 MMOL/L — SIGNIFICANT CHANGE UP (ref 135–145)
TROPONIN T, HIGH SENSITIVITY RESULT: 64 NG/L — CRITICAL HIGH
TROPONIN T, HIGH SENSITIVITY RESULT: 68 NG/L — CRITICAL HIGH
WBC # BLD: 5.49 K/UL — SIGNIFICANT CHANGE UP (ref 3.8–10.5)
WBC # FLD AUTO: 5.49 K/UL — SIGNIFICANT CHANGE UP (ref 3.8–10.5)

## 2023-02-13 PROCEDURE — 93010 ELECTROCARDIOGRAM REPORT: CPT | Mod: 76

## 2023-02-13 RX ORDER — WARFARIN SODIUM 2.5 MG/1
5 TABLET ORAL ONCE
Refills: 0 | Status: COMPLETED | OUTPATIENT
Start: 2023-02-13 | End: 2023-02-13

## 2023-02-13 RX ADMIN — Medication 50 MILLIGRAM(S): at 21:47

## 2023-02-13 RX ADMIN — Medication 25 UNIT(S): at 21:41

## 2023-02-13 RX ADMIN — WARFARIN SODIUM 5 MILLIGRAM(S): 2.5 TABLET ORAL at 21:47

## 2023-02-13 RX ADMIN — Medication 5 UNIT(S): at 17:59

## 2023-02-13 RX ADMIN — LIDOCAINE 1 PATCH: 4 CREAM TOPICAL at 18:08

## 2023-02-13 RX ADMIN — BUDESONIDE AND FORMOTEROL FUMARATE DIHYDRATE 2 PUFF(S): 160; 4.5 AEROSOL RESPIRATORY (INHALATION) at 21:41

## 2023-02-13 RX ADMIN — AMIODARONE HYDROCHLORIDE 200 MILLIGRAM(S): 400 TABLET ORAL at 07:00

## 2023-02-13 RX ADMIN — Medication 1 SPRAY(S): at 18:00

## 2023-02-13 RX ADMIN — Medication 1 APPLICATION(S): at 18:00

## 2023-02-13 RX ADMIN — SIMVASTATIN 10 MILLIGRAM(S): 20 TABLET, FILM COATED ORAL at 21:41

## 2023-02-13 RX ADMIN — Medication 1 APPLICATION(S): at 01:07

## 2023-02-13 RX ADMIN — Medication 50 MILLIGRAM(S): at 07:00

## 2023-02-13 RX ADMIN — Medication 1 SPRAY(S): at 10:56

## 2023-02-13 RX ADMIN — Medication 650 MILLIGRAM(S): at 01:07

## 2023-02-13 RX ADMIN — Medication 5 MILLIGRAM(S): at 07:00

## 2023-02-13 RX ADMIN — LIDOCAINE 1 PATCH: 4 CREAM TOPICAL at 13:22

## 2023-02-13 RX ADMIN — Medication 1 SPRAY(S): at 21:41

## 2023-02-13 RX ADMIN — BUMETANIDE 2 MILLIGRAM(S): 0.25 INJECTION INTRAMUSCULAR; INTRAVENOUS at 07:00

## 2023-02-13 RX ADMIN — Medication 1 SPRAY(S): at 06:29

## 2023-02-13 RX ADMIN — Medication 1 APPLICATION(S): at 06:28

## 2023-02-13 RX ADMIN — Medication 1 APPLICATION(S): at 23:55

## 2023-02-13 RX ADMIN — Medication 1 SPRAY(S): at 14:08

## 2023-02-13 RX ADMIN — LIDOCAINE 1 PATCH: 4 CREAM TOPICAL at 06:25

## 2023-02-13 RX ADMIN — Medication 1 APPLICATION(S): at 13:12

## 2023-02-13 RX ADMIN — BUDESONIDE AND FORMOTEROL FUMARATE DIHYDRATE 2 PUFF(S): 160; 4.5 AEROSOL RESPIRATORY (INHALATION) at 12:56

## 2023-02-13 RX ADMIN — Medication 325 MILLIGRAM(S): at 13:12

## 2023-02-13 RX ADMIN — LIDOCAINE 1 PATCH: 4 CREAM TOPICAL at 12:59

## 2023-02-13 RX ADMIN — ISOSORBIDE MONONITRATE 30 MILLIGRAM(S): 60 TABLET, EXTENDED RELEASE ORAL at 13:12

## 2023-02-13 RX ADMIN — Medication 50 MILLIGRAM(S): at 14:08

## 2023-02-13 NOTE — CHART NOTE - NSCHARTNOTEFT_GEN_A_CORE
Patient noted to be a bit lethargic this morning. She feels like room is dark and and feels like she is going to pass out.   Vital signs checked with stable /54 and 124/56. HR 70. FS normal.   Patient seen and examined at bedside. Found to be resting in bed. Tells me that she has some discomfort in abdomen, but denies CP, palpitations, headache, N/V.   She does admit chronic SOB that is not new.   When asked to tell me the year, she answered 2003, but said the month is February. (AAO x 2-3 now; baseline is AAO x 3).   On exam, lungs are clear. Heart sounds are normal. Abdomen is soft and NT/ND.   Neuro exam is non focal at present, patient with full strength in all upper and lower extremities, follows all commands appropriately.   Of note, patient did not wear her bipap last night.  She was placed back on bipap now and stat VBG ordered to evaluate for any CO2 retention.   Will also get screening Troponin and CK/CKMB though she denies any cardiac complaints at present.   If no improvement with bipap, will have low threshold to obtain CT head for further evaluation given patient is on AC with therapeutic INR for mech valve.   Cardiology asked for follow up at attendings request.   Above events and plan discussed with Dr. Newman over the phone. Patient noted to be a bit lethargic this morning. She feels like room is dark and and feels like she is going to pass out.   Vital signs checked with stable /54 and 124/56. HR 70. FS normal.   Patient seen and examined at bedside. Found to be resting in bed. Tells me that she has some discomfort in abdomen, but denies CP, palpitations, headache, N/V.   She does admit chronic SOB that is not new.   When asked to tell me the year, she answered 2003, but said the month is February. (AAO x 2-3 now; baseline is AAO x 3).   On exam, lungs are clear. Heart sounds are normal. Abdomen is soft and NT/ND.   Neuro exam is non focal at present, patient with full strength in all upper and lower extremities, follows all commands appropriately.   Of note, patient did not wear her bipap last night.  She was placed back on bipap now and stat VBG ordered to evaluate for any CO2 retention.   Will also get screening Troponin and CK/CKMB though she denies any cardiac complaints at present.   If no improvement with bipap, will have low threshold to obtain CT head for further evaluation given patient is on AC with therapeutic INR for mech valve.   Cardiology asked for follow up at attendings request.   Above events and plan discussed with Dr. Newman over the phone.    Addendum:   Troponin results at 68 with normal CK.   Patient re-assessed, admits some mild epigastric discomfort, but denies any chest pain or current cardiac complaints.   Now appears much more awake and alert after being on bipap.   EKG done showing sinus rhythm with short MO and supraventricular complexes at 68bpm, old RBBB, being read as "ACUTE MI" however there was significant artifact on EKG and it appears grossly unchanged compared to prior EKG from 2/4.   2nd Trop obtained downtrended to 64, with normal CK. Still with no CP.   Repeat EKG done showing sinus with 1st deg AVB, old RBBB, QTc 544, otherwise unchanged in appearance compared to old EKG from 2/4/23.  Trop trend not consistent with ACS.   Labs and EKGs reviewed with cardiology and with primary attending.   Likely elevated in setting of demand and underlying renal disease.   Will get repeat ECHO, hold off on trending enzymes further. No other change in management acutely at this time per cardiology.   Repeat EKG in AM to re-evaluate QTc. Zofran DC'd.   If still prolonged, will consider EP consult.   Patient currently stable, will continue to monitor.   Above events and plan discussed with Dr. Newman.

## 2023-02-13 NOTE — PROVIDER CONTACT NOTE (CHANGE IN STATUS NOTIFICATION) - RECOMMENDATIONS
Patient was on Heparin drip discontinued 02/12/2023 given Coumadin 5mg 02/12 Patient was on Heparin drip discontinued 02/12/2023 given Coumadin 5mg 02/12 INR 2/13 2.59

## 2023-02-13 NOTE — PROVIDER CONTACT NOTE (CHANGE IN STATUS NOTIFICATION) - BACKGROUND
PMH Mitral Valve replacement on 3 liter NC 02 at home,CHF,Epistaxis HTN Type 2 Diabetes weakness and synscope

## 2023-02-13 NOTE — PROGRESS NOTE ADULT - SUBJECTIVE AND OBJECTIVE BOX
SUBJECTIVE / OVERNIGHT EVENTS:pt seen and examined, pt was lethragic earlier during the day , found to have elevated bicarb - improved after [facility]  02-13-23    MEDICATIONS  (STANDING):  aMIOdarone    Tablet 200 milliGRAM(s) Oral daily  budesonide 160 MICROgram(s)/formoterol 4.5 MICROgram(s) Inhaler 2 Puff(s) Inhalation two times a day  buMETAnide 2 milliGRAM(s) Oral daily  dextrose 5%. 1000 milliLiter(s) (100 mL/Hr) IV Continuous <Continuous>  dextrose 5%. 1000 milliLiter(s) (50 mL/Hr) IV Continuous <Continuous>  dextrose 50% Injectable 25 Gram(s) IV Push once  dextrose 50% Injectable 12.5 Gram(s) IV Push once  dextrose 50% Injectable 25 Gram(s) IV Push once  ferrous    sulfate 325 milliGRAM(s) Oral daily  glucagon  Injectable 1 milliGRAM(s) IntraMuscular once  hydrALAZINE 50 milliGRAM(s) Oral three times a day  insulin detemir injectable (LEVEMIR) 25 Unit(s) SubCutaneous at bedtime  insulin lispro (ADMELOG) corrective regimen sliding scale   SubCutaneous three times a day before meals  insulin lispro (ADMELOG) corrective regimen sliding scale   SubCutaneous at bedtime  insulin lispro Injectable (ADMELOG) 5 Unit(s) SubCutaneous three times a day before meals  isosorbide   mononitrate ER Tablet (IMDUR) 30 milliGRAM(s) Oral daily  pantoprazole    Tablet 40 milliGRAM(s) Oral before breakfast  petrolatum white Ointment 1 Application(s) Topical every 6 hours  predniSONE   Tablet 5 milliGRAM(s) Oral daily  simvastatin 10 milliGRAM(s) Oral at bedtime  sodium chloride 0.65% Nasal 1 Spray(s) Both Nostrils every 4 hours    MEDICATIONS  (PRN):  acetaminophen     Tablet .. 650 milliGRAM(s) Oral every 6 hours PRN Temp greater or equal to 38C (100.4F), Mild Pain (1 - 3)  albuterol    90 MICROgram(s) HFA Inhaler 2 Puff(s) Inhalation every 6 hours PRN Bronchospasm  aluminum hydroxide/magnesium hydroxide/simethicone Suspension 30 milliLiter(s) Oral every 4 hours PRN Dyspepsia  dextrose Oral Gel 15 Gram(s) Oral once PRN Blood Glucose LESS THAN 70 milliGRAM(s)/deciliter  lidocaine   4% Patch 1 Patch Transdermal daily PRN pain  melatonin 3 milliGRAM(s) Oral at bedtime PRN Insomnia  oxymetazoline 0.05% Nasal Spray 2 Spray(s) Both Nostrils every 12 hours PRN Nose bleeds    Vital Signs Last 24 Hrs  T(C): 36.4 (02-13-23 @ 21:45), Max: 37 (02-12-23 @ 22:55)  T(F): 97.6 (02-13-23 @ 21:45), Max: 98.6 (02-12-23 @ 22:55)  HR: 62 (02-13-23 @ 21:45) (56 - 76)  BP: 125/59 (02-13-23 @ 21:45) (115/45 - 148/73)  BP(mean): --  RR: 17 (02-13-23 @ 21:45) (17 - 18)  SpO2: 98% (02-13-23 @ 21:45) (98% - 100%)              Constitutional: No fever, fatigue  Skin: No rash.  Eyes: No recent vision problems or eye pain.  ENT: No congestion, ear pain, or sore throat.  Cardiovascular: No chest pain or palpation.  Respiratory: No cough, shortness of breath, congestion, or wheezing.  Gastrointestinal: No abdominal pain, nausea, vomiting, or diarrhea.  Genitourinary: No dysuria.  Musculoskeletal: No joint swelling.  Neurologic: No headache.    PHYSICAL EXAM:  GENERAL: NAD  EYES: EOMI, PERRLA  NECK: Supple, No JVD  CHEST/LUNG: dec breath sounds at bases  HEART:  S1 , S2 +  ABDOMEN: soft, bs+  EXTREMITIES:  edema+  NEUROLOGY:alert awake oriented    LABS:  02-13    136  |  93<L>  |  83<H>  ----------------------------<  122<H>  5.1   |  34<H>  |  2.56<H>    Ca    9.7      13 Feb 2023 10:00  Phos  4.8     02-13  Mg     2.30     02-13      Creatinine Trend: 2.56 <--, 2.73 <--, 2.54 <--, 2.60 <--, 2.64 <--, 2.72 <--, 2.28 <--, 2.18 <--                        7.8    5.49  )-----------( 329      ( 13 Feb 2023 06:35 )             27.8     Urine Studies:      CARDIAC MARKERS ( 13 Feb 2023 13:30 )  x     / x     / 29 U/L / x     / 2.1 ng/mL  CARDIAC MARKERS ( 13 Feb 2023 10:00 )  x     / x     / 29 U/L / x     / 2.1 ng/mL          PT/INR - ( 13 Feb 2023 06:35 )   PT: 30.3 sec;   INR: 2.59 ratio         PTT - ( 13 Feb 2023 06:35 )  PTT:45.7 sec          PT/INR - ( 13 Feb 2023 06:35 )   PT: 30.3 sec;   INR: 2.59 ratio         PTT - ( 13 Feb 2023 06:35 )  PTT:45.7 sec    Imaging Personally Reviewed:yes    Consultant(s) Notes Reviewed:  yes    Care Discussed with Consultants/Other Providers:yes

## 2023-02-14 LAB
ANION GAP SERPL CALC-SCNC: 11 MMOL/L — SIGNIFICANT CHANGE UP (ref 7–14)
BASE EXCESS BLDV CALC-SCNC: 10.8 MMOL/L — HIGH (ref -2–3)
BLOOD GAS VENOUS COMPREHENSIVE RESULT: SIGNIFICANT CHANGE UP
BUN SERPL-MCNC: 87 MG/DL — HIGH (ref 7–23)
CALCIUM SERPL-MCNC: 9.8 MG/DL — SIGNIFICANT CHANGE UP (ref 8.4–10.5)
CHLORIDE BLDV-SCNC: 96 MMOL/L — SIGNIFICANT CHANGE UP (ref 96–108)
CHLORIDE SERPL-SCNC: 94 MMOL/L — LOW (ref 98–107)
CO2 BLDV-SCNC: 41.2 MMOL/L — HIGH (ref 22–26)
CO2 SERPL-SCNC: 32 MMOL/L — HIGH (ref 22–31)
CREAT SERPL-MCNC: 2.5 MG/DL — HIGH (ref 0.5–1.3)
EGFR: 20 ML/MIN/1.73M2 — LOW
GAS PNL BLDV: 138 MMOL/L — SIGNIFICANT CHANGE UP (ref 136–145)
GLUCOSE BLDC GLUCOMTR-MCNC: 100 MG/DL — HIGH (ref 70–99)
GLUCOSE BLDC GLUCOMTR-MCNC: 104 MG/DL — HIGH (ref 70–99)
GLUCOSE BLDC GLUCOMTR-MCNC: 133 MG/DL — HIGH (ref 70–99)
GLUCOSE BLDC GLUCOMTR-MCNC: 146 MG/DL — HIGH (ref 70–99)
GLUCOSE BLDV-MCNC: 110 MG/DL — HIGH (ref 70–99)
GLUCOSE SERPL-MCNC: 115 MG/DL — HIGH (ref 70–99)
HCO3 BLDV-SCNC: 39 MMOL/L — HIGH (ref 22–29)
HCT VFR BLD CALC: 27.9 % — LOW (ref 34.5–45)
HCT VFR BLDA CALC: 25 % — LOW (ref 34.5–46.5)
HGB BLD CALC-MCNC: 8.4 G/DL — LOW (ref 11.7–16.1)
HGB BLD-MCNC: 7.9 G/DL — LOW (ref 11.5–15.5)
INR BLD: 2.61 RATIO — HIGH (ref 0.88–1.16)
LACTATE BLDV-MCNC: 1.1 MMOL/L — SIGNIFICANT CHANGE UP (ref 0.5–2)
MAGNESIUM SERPL-MCNC: 2.3 MG/DL — SIGNIFICANT CHANGE UP (ref 1.6–2.6)
MCHC RBC-ENTMCNC: 28.3 GM/DL — LOW (ref 32–36)
MCHC RBC-ENTMCNC: 29.9 PG — SIGNIFICANT CHANGE UP (ref 27–34)
MCV RBC AUTO: 105.7 FL — HIGH (ref 80–100)
NRBC # BLD: 0 /100 WBCS — SIGNIFICANT CHANGE UP (ref 0–0)
NRBC # FLD: 0.04 K/UL — HIGH (ref 0–0)
PCO2 BLDV: 77 MMHG — HIGH (ref 39–52)
PH BLDV: 7.31 — LOW (ref 7.32–7.43)
PHOSPHATE SERPL-MCNC: 4.1 MG/DL — SIGNIFICANT CHANGE UP (ref 2.5–4.5)
PLATELET # BLD AUTO: 351 K/UL — SIGNIFICANT CHANGE UP (ref 150–400)
PO2 BLDV: 49 MMHG — HIGH (ref 25–45)
POTASSIUM BLDV-SCNC: 4.8 MMOL/L — SIGNIFICANT CHANGE UP (ref 3.5–5.1)
POTASSIUM SERPL-MCNC: 4.8 MMOL/L — SIGNIFICANT CHANGE UP (ref 3.5–5.3)
POTASSIUM SERPL-SCNC: 4.8 MMOL/L — SIGNIFICANT CHANGE UP (ref 3.5–5.3)
PROTHROM AB SERPL-ACNC: 30.6 SEC — HIGH (ref 10.5–13.4)
RBC # BLD: 2.64 M/UL — LOW (ref 3.8–5.2)
RBC # FLD: 14.6 % — HIGH (ref 10.3–14.5)
SAO2 % BLDV: 81.2 % — SIGNIFICANT CHANGE UP (ref 67–88)
SODIUM SERPL-SCNC: 137 MMOL/L — SIGNIFICANT CHANGE UP (ref 135–145)
WBC # BLD: 5.86 K/UL — SIGNIFICANT CHANGE UP (ref 3.8–10.5)
WBC # FLD AUTO: 5.86 K/UL — SIGNIFICANT CHANGE UP (ref 3.8–10.5)

## 2023-02-14 PROCEDURE — 93306 TTE W/DOPPLER COMPLETE: CPT | Mod: 26

## 2023-02-14 PROCEDURE — 93010 ELECTROCARDIOGRAM REPORT: CPT

## 2023-02-14 PROCEDURE — 99232 SBSQ HOSP IP/OBS MODERATE 35: CPT

## 2023-02-14 PROCEDURE — 71045 X-RAY EXAM CHEST 1 VIEW: CPT | Mod: 26

## 2023-02-14 RX ORDER — WARFARIN SODIUM 2.5 MG/1
5 TABLET ORAL ONCE
Refills: 0 | Status: COMPLETED | OUTPATIENT
Start: 2023-02-14 | End: 2023-02-14

## 2023-02-14 RX ADMIN — Medication 5 MILLIGRAM(S): at 05:40

## 2023-02-14 RX ADMIN — WARFARIN SODIUM 5 MILLIGRAM(S): 2.5 TABLET ORAL at 21:29

## 2023-02-14 RX ADMIN — LIDOCAINE 1 PATCH: 4 CREAM TOPICAL at 19:06

## 2023-02-14 RX ADMIN — BUMETANIDE 2 MILLIGRAM(S): 0.25 INJECTION INTRAMUSCULAR; INTRAVENOUS at 05:40

## 2023-02-14 RX ADMIN — Medication 25 UNIT(S): at 21:44

## 2023-02-14 RX ADMIN — Medication 50 MILLIGRAM(S): at 21:29

## 2023-02-14 RX ADMIN — Medication 1 SPRAY(S): at 14:37

## 2023-02-14 RX ADMIN — Medication 1 SPRAY(S): at 21:30

## 2023-02-14 RX ADMIN — ISOSORBIDE MONONITRATE 30 MILLIGRAM(S): 60 TABLET, EXTENDED RELEASE ORAL at 12:17

## 2023-02-14 RX ADMIN — LIDOCAINE 1 PATCH: 4 CREAM TOPICAL at 00:35

## 2023-02-14 RX ADMIN — Medication 1 APPLICATION(S): at 06:19

## 2023-02-14 RX ADMIN — Medication 1 SPRAY(S): at 01:11

## 2023-02-14 RX ADMIN — Medication 5 UNIT(S): at 09:12

## 2023-02-14 RX ADMIN — SIMVASTATIN 10 MILLIGRAM(S): 20 TABLET, FILM COATED ORAL at 21:29

## 2023-02-14 RX ADMIN — Medication 1 SPRAY(S): at 05:41

## 2023-02-14 RX ADMIN — Medication 1 APPLICATION(S): at 12:16

## 2023-02-14 RX ADMIN — Medication 325 MILLIGRAM(S): at 12:17

## 2023-02-14 RX ADMIN — AMIODARONE HYDROCHLORIDE 200 MILLIGRAM(S): 400 TABLET ORAL at 05:40

## 2023-02-14 RX ADMIN — Medication 650 MILLIGRAM(S): at 19:08

## 2023-02-14 RX ADMIN — Medication 50 MILLIGRAM(S): at 14:36

## 2023-02-14 RX ADMIN — Medication 1 SPRAY(S): at 12:15

## 2023-02-14 RX ADMIN — BUDESONIDE AND FORMOTEROL FUMARATE DIHYDRATE 2 PUFF(S): 160; 4.5 AEROSOL RESPIRATORY (INHALATION) at 12:16

## 2023-02-14 RX ADMIN — Medication 1 APPLICATION(S): at 23:27

## 2023-02-14 RX ADMIN — LIDOCAINE 1 PATCH: 4 CREAM TOPICAL at 18:11

## 2023-02-14 RX ADMIN — BUDESONIDE AND FORMOTEROL FUMARATE DIHYDRATE 2 PUFF(S): 160; 4.5 AEROSOL RESPIRATORY (INHALATION) at 21:29

## 2023-02-14 RX ADMIN — Medication 50 MILLIGRAM(S): at 05:40

## 2023-02-14 RX ADMIN — Medication 5 UNIT(S): at 12:38

## 2023-02-14 NOTE — CONSULT NOTE ADULT - SUBJECTIVE AND OBJECTIVE BOX
Neurology consult    DAMARI [id number]     Patient is a 72y old  Female who presents with a chief complaint of Epistaxis (14 Feb 2023 09:22)      HPI:  73 y/o female with PMHx of Gout, COPD, HTN, DM, CHF, CKD, Afib, Pulm HTN, 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 Patient was recently here for COPD exacerbation and discharged home with therapeutic INR. She was in a relatively baseline state of health and uses 3L NC at home until this PM she started having diffuse epistaxis for about 30 minutes. She states it was jacey blood and she used up almost 1 roll of paper towels. She states she has been taking her medication consistently and able to state that she has taken her warfarin 5mg this AM. Denies chest pain, dyspnea, diarrhea, fever, chills, cough, URI symptoms, headaches. She does have chronic dizziness. Denies melena or hematochezia.  In the ED, NC3L, 168/69. Epistaxis stopped. INR subtherapeutic to 1~. Hgb to ~8 from 10 from last admission. (04 Feb 2023 23:34)    episode of lethargy    MEDICATIONS    acetaminophen     Tablet .. 650 milliGRAM(s) Oral every 6 hours PRN  albuterol    90 MICROgram(s) HFA Inhaler 2 Puff(s) Inhalation every 6 hours PRN  aluminum hydroxide/magnesium hydroxide/simethicone Suspension 30 milliLiter(s) Oral every 4 hours PRN  aMIOdarone    Tablet 200 milliGRAM(s) Oral daily  budesonide 160 MICROgram(s)/formoterol 4.5 MICROgram(s) Inhaler 2 Puff(s) Inhalation two times a day  buMETAnide 2 milliGRAM(s) Oral daily  dextrose 5%. 1000 milliLiter(s) IV Continuous <Continuous>  dextrose 5%. 1000 milliLiter(s) IV Continuous <Continuous>  dextrose 50% Injectable 25 Gram(s) IV Push once  dextrose 50% Injectable 12.5 Gram(s) IV Push once  dextrose 50% Injectable 25 Gram(s) IV Push once  dextrose Oral Gel 15 Gram(s) Oral once PRN  ferrous    sulfate 325 milliGRAM(s) Oral daily  glucagon  Injectable 1 milliGRAM(s) IntraMuscular once  hydrALAZINE 50 milliGRAM(s) Oral three times a day  insulin detemir injectable (LEVEMIR) 25 Unit(s) SubCutaneous at bedtime  insulin lispro (ADMELOG) corrective regimen sliding scale   SubCutaneous at bedtime  insulin lispro (ADMELOG) corrective regimen sliding scale   SubCutaneous three times a day before meals  insulin lispro Injectable (ADMELOG) 5 Unit(s) SubCutaneous three times a day before meals  isosorbide   mononitrate ER Tablet (IMDUR) 30 milliGRAM(s) Oral daily  lidocaine   4% Patch 1 Patch Transdermal daily PRN  melatonin 3 milliGRAM(s) Oral at bedtime PRN  oxymetazoline 0.05% Nasal Spray 2 Spray(s) Both Nostrils every 12 hours PRN  pantoprazole    Tablet 40 milliGRAM(s) Oral before breakfast  petrolatum white Ointment 1 Application(s) Topical every 6 hours  predniSONE   Tablet 5 milliGRAM(s) Oral daily  simvastatin 10 milliGRAM(s) Oral at bedtime  sodium chloride 0.65% Nasal 1 Spray(s) Both Nostrils every 4 hours  warfarin 5 milliGRAM(s) Oral once      PMH: Atrial fibrillation    Pulmonary hypertension    MIGUEL (obstructive sleep apnea)    COPD (chronic obstructive pulmonary disease)    CHF (congestive heart failure)    HTN (hypertension)    Gout    Mitral valve replaced    Tricuspid valve replaced    CHF (congestive heart failure)    Hypertension    COPD (chronic obstructive pulmonary disease)    Chronic atrial fibrillation    HTN (hypertension)    COPD, severe    History of mitral valve replacement with mechanical valve         PSH: No significant past surgical history    History of mitral valve replacement with mechanical valve    Tricuspid valve replaced    No significant past surgical history        Family history: No history of dementia, strokes, or seizures   FAMILY HISTORY:  Family history of hypertension (Father, Sibling)    Family history of stroke    Family history of hypertension (Mother)        SOCIAL HISTORY:  No history of tobacco or alcohol use     Allergies    No Known Allergies    Intolerances            Vital Signs Last 24 Hrs  T(C): 36.6 (14 Feb 2023 05:30), Max: 36.6 (14 Feb 2023 05:30)  T(F): 97.9 (14 Feb 2023 05:30), Max: 97.9 (14 Feb 2023 05:30)  HR: 65 (14 Feb 2023 07:31) (62 - 76)  BP: 118/62 (14 Feb 2023 05:30) (118/62 - 148/73)  BP(mean): --  RR: 17 (14 Feb 2023 05:30) (17 - 17)  SpO2: 98% (14 Feb 2023 07:31) (98% - 100%)    Parameters below as of 14 Feb 2023 05:30  Patient On (Oxygen Delivery Method): nasal cannula  O2 Flow (L/min): 3        On Neurological Examination:    Mental Status - Patient is alert, awake, oriented X3. fluent, names, no dysarthria no aphasia Follows commands well and able to answer questions appropriately. Mood and affect  normal    Cranial Nerves - PERRL, EOMI, VFF, V1-V3 intact, no gross facial asymmetry, tongue/uvula midline    Motor Exam -   no drift    Sensory    Intact to light touch and pinprick bilaterally    Coord: FTN intact bilaterally     Gait -  normal               LABS:  CBC Full  -  ( 14 Feb 2023 06:23 )  WBC Count : 5.86 K/uL  RBC Count : 2.64 M/uL  Hemoglobin : 7.9 g/dL  Hematocrit : 27.9 %  Platelet Count - Automated : 351 K/uL  Mean Cell Volume : 105.7 fL  Mean Cell Hemoglobin : 29.9 pg  Mean Cell Hemoglobin Concentration : 28.3 gm/dL  Auto Neutrophil # : x  Auto Lymphocyte # : x  Auto Monocyte # : x  Auto Eosinophil # : x  Auto Basophil # : x  Auto Neutrophil % : x  Auto Lymphocyte % : x  Auto Monocyte % : x  Auto Eosinophil % : x  Auto Basophil % : x      02-14    137  |  94<L>  |  87<H>  ----------------------------<  115<H>  4.8   |  32<H>  |  2.50<H>    Ca    9.8      14 Feb 2023 06:23  Phos  4.1     02-14  Mg     2.30     02-14        Hemoglobin A1C:       PT/INR - ( 14 Feb 2023 06:23 )   PT: 30.6 sec;   INR: 2.61 ratio         PTT - ( 13 Feb 2023 06:35 )  PTT:45.7 sec      RADIOLOGY  CT   MRI:

## 2023-02-14 NOTE — DIETITIAN INITIAL EVALUATION ADULT - ADD RECOMMEND
1) Recommend add Ensure Max 1x daily (150cal, 30gm pro).   2) Recommend continue with current diet, which remains appropriate at this time.   3) Monitor PO intake, Labs, weights, BMs, and skin integrity.   4) RD to remain available for further nutritional interventions as indicated.

## 2023-02-14 NOTE — DIETITIAN INITIAL EVALUATION ADULT - OTHER INFO
Per chart review, 71 y/o female with PMHx of Gout, COPD, HTN, DM, CHF, CKD, Afib, Pulm HTN, Mitral and Tricuspid Valve replacement (mechanical valve on warfarin), on 3L home O2 presents to ED for evaluation of epistaxis now admitted due to concern of acute drop in hemoglobin and subtherapeutic INR.    Pt seen at bedside. Reports has fair appetite in the hospital. Lunch visibly seen with ~75% intake. Food preferences explored, pt is amenable to Esnure Max 1x daily (150cal, 30gm pro). Denies chewing and swallowing difficulties. Denies any GI distress (nausea/vomiting/diarrhea/constipation.) Last BM few days ago. Bowel regimen not in use. Pt denies constipation, states this is "normal" for her. Encouraged patient to increase fiber intake. Labs notable with elevated POCT 155H. Pt noted on steroid. Currently on CC diet and insulin regimen.      Patient reported Ht:5'4, UBW 242lbs last month. Most recent adm weight 2/5-242.8lbs. Pt weight has been relatively stable x1mo. Patient noted with 1+ generalized edema which is masking her weight loss.

## 2023-02-14 NOTE — CHART NOTE - NSCHARTNOTEFT_GEN_A_CORE
Alerted by RN patient has no appetite for dinner; will hold dinner time lispro given FS only 100.   Patient seen and examined at bedside.   Found to be sitting up on bedside commode with nasal cannula in place.   She states she feels short of breath, feels like her lungs are full of water, and she asking for a "shot of lasix" because she feels she hasn't been peeing enough.   She also admits intermittent dizziness that has been bothering her for several days.   She is not lethargic and she is AAO x 3, moving all extremities, no new focal neurological deficits. NO change in mental status.   On exam, she has crackles at left lung base. Heart sounds are normal. Abdomen soft, NT, ND.   Vitals have been stable today.   Will check repeat set of vitals, orthostatics, repeat EKG to re-evaluate QTc as it was prolonged on previous EKG, and get CXR for further evaluation.   Patient is on bumex 2mg PO daily. Will follow up CXR; if evidence of volume overload may benefit from extra dose of diuretic.   If any change in mental status or recurrent lethargy, would have low threshold to get CT head as per neuro note from today.  Will sign out as above to night provider for close monitoring and follow up.   Will discuss above events and plan with Dr. Newman and follow up any further recommendations.

## 2023-02-14 NOTE — PROGRESS NOTE ADULT - SUBJECTIVE AND OBJECTIVE BOX
SUBJECTIVE / OVERNIGHT EVENTS:pt seen and examined, pt more alert communicative today  02-14-23    MEDICATIONS  (STANDING):  aMIOdarone    Tablet 200 milliGRAM(s) Oral daily  budesonide 160 MICROgram(s)/formoterol 4.5 MICROgram(s) Inhaler 2 Puff(s) Inhalation two times a day  buMETAnide 2 milliGRAM(s) Oral daily  dextrose 5%. 1000 milliLiter(s) (100 mL/Hr) IV Continuous <Continuous>  dextrose 5%. 1000 milliLiter(s) (50 mL/Hr) IV Continuous <Continuous>  dextrose 50% Injectable 25 Gram(s) IV Push once  dextrose 50% Injectable 12.5 Gram(s) IV Push once  dextrose 50% Injectable 25 Gram(s) IV Push once  ferrous    sulfate 325 milliGRAM(s) Oral daily  glucagon  Injectable 1 milliGRAM(s) IntraMuscular once  hydrALAZINE 50 milliGRAM(s) Oral three times a day  insulin detemir injectable (LEVEMIR) 25 Unit(s) SubCutaneous at bedtime  insulin lispro (ADMELOG) corrective regimen sliding scale   SubCutaneous three times a day before meals  insulin lispro (ADMELOG) corrective regimen sliding scale   SubCutaneous at bedtime  insulin lispro Injectable (ADMELOG) 5 Unit(s) SubCutaneous three times a day before meals  isosorbide   mononitrate ER Tablet (IMDUR) 30 milliGRAM(s) Oral daily  pantoprazole    Tablet 40 milliGRAM(s) Oral before breakfast  petrolatum white Ointment 1 Application(s) Topical every 6 hours  predniSONE   Tablet 5 milliGRAM(s) Oral daily  simvastatin 10 milliGRAM(s) Oral at bedtime  sodium chloride 0.65% Nasal 1 Spray(s) Both Nostrils every 4 hours  warfarin 5 milliGRAM(s) Oral once    MEDICATIONS  (PRN):  acetaminophen     Tablet .. 650 milliGRAM(s) Oral every 6 hours PRN Temp greater or equal to 38C (100.4F), Mild Pain (1 - 3)  albuterol    90 MICROgram(s) HFA Inhaler 2 Puff(s) Inhalation every 6 hours PRN Bronchospasm  aluminum hydroxide/magnesium hydroxide/simethicone Suspension 30 milliLiter(s) Oral every 4 hours PRN Dyspepsia  dextrose Oral Gel 15 Gram(s) Oral once PRN Blood Glucose LESS THAN 70 milliGRAM(s)/deciliter  lidocaine   4% Patch 1 Patch Transdermal daily PRN pain  melatonin 3 milliGRAM(s) Oral at bedtime PRN Insomnia  oxymetazoline 0.05% Nasal Spray 2 Spray(s) Both Nostrils every 12 hours PRN Nose bleeds    Vital Signs Last 24 Hrs  T(C): 36.8 (02-14-23 @ 12:42), Max: 36.8 (02-14-23 @ 12:42)  T(F): 98.3 (02-14-23 @ 12:42), Max: 98.3 (02-14-23 @ 12:42)  HR: 62 (02-14-23 @ 12:42) (62 - 66)  BP: 136/51 (02-14-23 @ 12:42) (118/62 - 136/51)  BP(mean): --  RR: 18 (02-14-23 @ 12:42) (17 - 18)  SpO2: 100% (02-14-23 @ 12:42) (98% - 100%)    Orthostatic VS  02-14-23 @ 18:45  Lying BP: --/-- HR: 66  Sitting BP: 123/77 HR: 71  Standing BP: 127/67 HR: --  Site: upper left arm  Mode: electronic              Constitutional: No fever, fatigue  Skin: No rash.  Eyes: No recent vision problems or eye pain.  ENT: No congestion, ear pain, or sore throat.  Cardiovascular: No chest pain or palpation.  Respiratory: No cough, shortness of breath, congestion, or wheezing.  Gastrointestinal: No abdominal pain, nausea, vomiting, or diarrhea.  Genitourinary: No dysuria.  Musculoskeletal: No joint swelling.  Neurologic: No headache.    PHYSICAL EXAM:  GENERAL: NAD  EYES: EOMI, PERRLA  NECK: Supple, No JVD  CHEST/LUNG: dec breath sounds at bases  HEART:  S1 , S2 +  ABDOMEN: soft, bs+  EXTREMITIES:  edema+  NEUROLOGY:alert awake oriented    LABS:  02-14    137  |  94<L>  |  87<H>  ----------------------------<  115<H>  4.8   |  32<H>  |  2.50<H>    Ca    9.8      14 Feb 2023 06:23  Phos  4.1     02-14  Mg     2.30     02-14      Creatinine Trend: 2.50 <--, 2.56 <--, 2.73 <--, 2.54 <--, 2.60 <--, 2.64 <--, 2.72 <--, 2.28 <--                        7.9    5.86  )-----------( 351      ( 14 Feb 2023 06:23 )             27.9     Urine Studies:      CARDIAC MARKERS ( 13 Feb 2023 13:30 )  x     / x     / 29 U/L / x     / 2.1 ng/mL  CARDIAC MARKERS ( 13 Feb 2023 10:00 )  x     / x     / 29 U/L / x     / 2.1 ng/mL          PT/INR - ( 14 Feb 2023 06:23 )   PT: 30.6 sec;   INR: 2.61 ratio         PTT - ( 13 Feb 2023 06:35 )  PTT:45.7 sec      Imaging Personally Reviewed:yes    Consultant(s) Notes Reviewed:  yes    Care Discussed with Consultants/Other Providers:yes

## 2023-02-14 NOTE — DIETITIAN INITIAL EVALUATION ADULT - PERTINENT MEDS FT
MEDICATIONS  (STANDING):  aMIOdarone    Tablet 200 milliGRAM(s) Oral daily  budesonide 160 MICROgram(s)/formoterol 4.5 MICROgram(s) Inhaler 2 Puff(s) Inhalation two times a day  buMETAnide 2 milliGRAM(s) Oral daily  dextrose 5%. 1000 milliLiter(s) (100 mL/Hr) IV Continuous <Continuous>  dextrose 5%. 1000 milliLiter(s) (50 mL/Hr) IV Continuous <Continuous>  dextrose 50% Injectable 25 Gram(s) IV Push once  dextrose 50% Injectable 12.5 Gram(s) IV Push once  dextrose 50% Injectable 25 Gram(s) IV Push once  ferrous    sulfate 325 milliGRAM(s) Oral daily  glucagon  Injectable 1 milliGRAM(s) IntraMuscular once  hydrALAZINE 50 milliGRAM(s) Oral three times a day  insulin detemir injectable (LEVEMIR) 25 Unit(s) SubCutaneous at bedtime  insulin lispro (ADMELOG) corrective regimen sliding scale   SubCutaneous three times a day before meals  insulin lispro (ADMELOG) corrective regimen sliding scale   SubCutaneous at bedtime  insulin lispro Injectable (ADMELOG) 5 Unit(s) SubCutaneous three times a day before meals  isosorbide   mononitrate ER Tablet (IMDUR) 30 milliGRAM(s) Oral daily  pantoprazole    Tablet 40 milliGRAM(s) Oral before breakfast  petrolatum white Ointment 1 Application(s) Topical every 6 hours  predniSONE   Tablet 5 milliGRAM(s) Oral daily  simvastatin 10 milliGRAM(s) Oral at bedtime  sodium chloride 0.65% Nasal 1 Spray(s) Both Nostrils every 4 hours  warfarin 5 milliGRAM(s) Oral once    MEDICATIONS  (PRN):  acetaminophen     Tablet .. 650 milliGRAM(s) Oral every 6 hours PRN Temp greater or equal to 38C (100.4F), Mild Pain (1 - 3)  albuterol    90 MICROgram(s) HFA Inhaler 2 Puff(s) Inhalation every 6 hours PRN Bronchospasm  aluminum hydroxide/magnesium hydroxide/simethicone Suspension 30 milliLiter(s) Oral every 4 hours PRN Dyspepsia  dextrose Oral Gel 15 Gram(s) Oral once PRN Blood Glucose LESS THAN 70 milliGRAM(s)/deciliter  lidocaine   4% Patch 1 Patch Transdermal daily PRN pain  melatonin 3 milliGRAM(s) Oral at bedtime PRN Insomnia  oxymetazoline 0.05% Nasal Spray 2 Spray(s) Both Nostrils every 12 hours PRN Nose bleeds

## 2023-02-14 NOTE — DIETITIAN INITIAL EVALUATION ADULT - PERTINENT LABORATORY DATA
02-14    137  |  94<L>  |  87<H>  ----------------------------<  115<H>  4.8   |  32<H>  |  2.50<H>    Ca    9.8      14 Feb 2023 06:23  Phos  4.1     02-14  Mg     2.30     02-14    POCT Blood Glucose.: 104 mg/dL (02-14-23 @ 12:13)  A1C with Estimated Average Glucose Result: 6.2 % (01-13-23 @ 08:45)  A1C with Estimated Average Glucose Result: 6.6 % (10-25-22 @ 04:54)  A1C with Estimated Average Glucose Result: 10.7 % (08-23-22 @ 23:18)

## 2023-02-14 NOTE — PROGRESS NOTE ADULT - SUBJECTIVE AND OBJECTIVE BOX
PULMONARY PROGRESS NOTE    DAMARI GUERRERO  MRN-[id number]    Patient is a 72y old  Female who presents with a chief complaint of Epistaxis (13 Feb 2023 11:50)      HPI:  -seen this morning  on NC  no epistaxis   -    ROS:   -    ACTIVE MEDICATION LIST:  MEDICATIONS  (STANDING):  aMIOdarone    Tablet 200 milliGRAM(s) Oral daily  budesonide 160 MICROgram(s)/formoterol 4.5 MICROgram(s) Inhaler 2 Puff(s) Inhalation two times a day  buMETAnide 2 milliGRAM(s) Oral daily  dextrose 5%. 1000 milliLiter(s) (50 mL/Hr) IV Continuous <Continuous>  dextrose 5%. 1000 milliLiter(s) (100 mL/Hr) IV Continuous <Continuous>  dextrose 50% Injectable 25 Gram(s) IV Push once  dextrose 50% Injectable 12.5 Gram(s) IV Push once  dextrose 50% Injectable 25 Gram(s) IV Push once  ferrous    sulfate 325 milliGRAM(s) Oral daily  glucagon  Injectable 1 milliGRAM(s) IntraMuscular once  hydrALAZINE 50 milliGRAM(s) Oral three times a day  insulin detemir injectable (LEVEMIR) 25 Unit(s) SubCutaneous at bedtime  insulin lispro (ADMELOG) corrective regimen sliding scale   SubCutaneous at bedtime  insulin lispro (ADMELOG) corrective regimen sliding scale   SubCutaneous three times a day before meals  insulin lispro Injectable (ADMELOG) 5 Unit(s) SubCutaneous three times a day before meals  isosorbide   mononitrate ER Tablet (IMDUR) 30 milliGRAM(s) Oral daily  pantoprazole    Tablet 40 milliGRAM(s) Oral before breakfast  petrolatum white Ointment 1 Application(s) Topical every 6 hours  predniSONE   Tablet 5 milliGRAM(s) Oral daily  simvastatin 10 milliGRAM(s) Oral at bedtime  sodium chloride 0.65% Nasal 1 Spray(s) Both Nostrils every 4 hours  warfarin 5 milliGRAM(s) Oral once    MEDICATIONS  (PRN):  acetaminophen     Tablet .. 650 milliGRAM(s) Oral every 6 hours PRN Temp greater or equal to 38C (100.4F), Mild Pain (1 - 3)  albuterol    90 MICROgram(s) HFA Inhaler 2 Puff(s) Inhalation every 6 hours PRN Bronchospasm  aluminum hydroxide/magnesium hydroxide/simethicone Suspension 30 milliLiter(s) Oral every 4 hours PRN Dyspepsia  dextrose Oral Gel 15 Gram(s) Oral once PRN Blood Glucose LESS THAN 70 milliGRAM(s)/deciliter  lidocaine   4% Patch 1 Patch Transdermal daily PRN pain  melatonin 3 milliGRAM(s) Oral at bedtime PRN Insomnia  oxymetazoline 0.05% Nasal Spray 2 Spray(s) Both Nostrils every 12 hours PRN Nose bleeds      EXAM:  Vital Signs Last 24 Hrs  T(C): 36.6 (14 Feb 2023 05:30), Max: 36.6 (14 Feb 2023 05:30)  T(F): 97.9 (14 Feb 2023 05:30), Max: 97.9 (14 Feb 2023 05:30)  HR: 65 (14 Feb 2023 07:31) (62 - 76)  BP: 118/62 (14 Feb 2023 05:30) (118/62 - 148/73)  BP(mean): --  RR: 17 (14 Feb 2023 05:30) (17 - 17)  SpO2: 98% (14 Feb 2023 07:31) (98% - 100%)    Parameters below as of 14 Feb 2023 05:30  Patient On (Oxygen Delivery Method): nasal cannula  O2 Flow (L/min): 3      GENERAL: The patient is awake and alert in no apparent distress.     LUNGS: Clear to auscultation without wheezing, rales or rhonchi; respirations unlabored                                 7.9    5.86  )-----------( 351      ( 14 Feb 2023 06:23 )             27.9       02-14    137  |  94<L>  |  87<H>  ----------------------------<  115<H>  4.8   |  32<H>  |  2.50<H>    Ca    9.8      14 Feb 2023 06:23  Phos  4.1     02-14  Mg     2.30     02-14       < from: Xray Chest 1 View AP/PA (02.04.23 @ 20:03) >    ACC: [id number] EXAM:  XR CHEST AP OR PA 1V   ORDERED BY: ILIR LEMOS     PROCEDURE DATE:  02/04/2023          INTERPRETATION:  EXAMINATION: XR CHEST    CLINICAL INDICATION: Shortness of breath    TECHNIQUE: Single frontal portable view of the chest from 2/4/2023 8:03 PM    COMPARISON: 1/17/2023    FINDINGS:  Status post sternotomy and mitral and tricuspid valve repairs.    The heart size is not accurately assessed on this projection.  Diffuse bilateral patchy opacities.  There is no pneumothoraxor pleural effusion.    IMPRESSION:  Diffuse bilateral patchy opacities, unchanged from prior.    --- End of Report ---          BECKA ROBB MD; Resident Radiologist  This document has been electronically signed.  HALEIGH MOTA M.D., ATTENDING INTERVENTIONAL RADIOLOGIST  This document has been electronically signed. Feb 5 2023  9:17AM    < end of copied text >  < from: CT Chest No Cont (01.19.23 @ 14:11) >    ACC: [id number] EXAM:  CT CHEST   ORDERED BY: ALEX DURAN     PROCEDURE DATE:  01/19/2023          INTERPRETATION:  CLINICAL INFORMATION: Short of breath. Evaluate for   pulmonary edema.    COMPARISON: CT chest 1/8/2022. CT chest 3/12/2009.    CONTRAST/COMPLICATIONS:  IV Contrast: NONE  Oral Contrast: NONE  Complications: None reported at time of study completion    PROCEDURE:  CT scan of the chest was obtained without intravenous contrast.    FINDINGS:    LYMPH NODES: Calcified mediastinallymph nodes.    HEART/VASCULATURE: Cardiomegaly. No pericardial effusion. Status post   mitral and tricuspid valve repair. Retained epicardial pacing wires.    AIRWAYS/LUNGS/PLEURA: Patent central airways. Left upper lobe linear   atelectasis versus scarring. Previously seen right upper lobe 3 mm nodule   has resolved. No pleural effusion.    UPPER ABDOMEN: Redemonstrated left upper pole renal mass measuring 3.7   cm, slightly increased from 1/8/2022. Cirrhotic morphology of the liver.    BONES/SOFT TISSUES: Status post median sternotomy.    IMPRESSION:    Since CT chest 1/8/2022:    No pulmonary edema.    Left upper pole renal mass demonstrating slight Interval increase since   previous exam of January 2022.            --- End of Report ---          SHERRI SOLANO MD; Resident Radiologist  This document has been electronically signed.  ERNESTINE ACOSTA MD; Attending Radiologist  This document has been electronically signed. Jan 19 2023  4:10PM    < end of copied text >      PROBLEM LIST:  72y Female with HEALTH ISSUES - PROBLEM Dx:  Epistaxis    Chronic atrial fibrillation    COPD, severe    CHF (congestive heart failure)    History of mitral valve replacement with mechanical valve    Hypertension    Need for prophylactic measure    Type 2 diabetes mellitus              RECS:  nc  bpap qhs  can go up to 16/7cmh on bpap base don todays gas  also not sure how long she actually wears it at night?        Please call with any questions.    Irma Eaton, DO  [facility] Pulmonary/Sleep Medicine  [phone]

## 2023-02-14 NOTE — CONSULT NOTE ADULT - ASSESSMENT
73 y/o female with PMHx of Gout, COPD, HTN, DM, CHF, CKD, Afib, Pulm HTN, Mitral and Tricuspid Valve replacement here with epistaxis. Neurology consulted for episode of lethargy, per chart with elevated bicarp, since resolved Neuro exam wnl    Impression: episode of lethargy likely TME in setting of elevated bicarb ?obesity hypoventilation      low threshold to obtain CT head if any further events given medical history  supportive care

## 2023-02-14 NOTE — DIETITIAN INITIAL EVALUATION ADULT - ORAL INTAKE PTA/DIET HISTORY
Patient reports her appetite has diminished over the past couple of months. Pt states she barely eats anything, only few bites at each meal due to lack of appetite. Patient denies any nausea and vomiting. As per pt, she follows low sodium diet PTA.  Pt states she has been "cutting down all the sugars and sweets". Pt denies any recent weight loss.

## 2023-02-14 NOTE — DIETITIAN INITIAL EVALUATION ADULT - WEIGHT USED FOR CALCULATIONS
Suturegard Body: The suture ends were repeatedly re-tightened and re-clamped to achieve the desired tissue expansion. IBW

## 2023-02-15 LAB
ANION GAP SERPL CALC-SCNC: 9 MMOL/L — SIGNIFICANT CHANGE UP (ref 7–14)
BASE EXCESS BLDV CALC-SCNC: 8.8 MMOL/L — HIGH (ref -2–3)
BLOOD GAS VENOUS COMPREHENSIVE RESULT: SIGNIFICANT CHANGE UP
BUN SERPL-MCNC: 90 MG/DL — HIGH (ref 7–23)
CALCIUM SERPL-MCNC: 10 MG/DL — SIGNIFICANT CHANGE UP (ref 8.4–10.5)
CHLORIDE BLDV-SCNC: 98 MMOL/L — SIGNIFICANT CHANGE UP (ref 96–108)
CHLORIDE SERPL-SCNC: 94 MMOL/L — LOW (ref 98–107)
CO2 BLDV-SCNC: 37.9 MMOL/L — HIGH (ref 22–26)
CO2 SERPL-SCNC: 34 MMOL/L — HIGH (ref 22–31)
CREAT SERPL-MCNC: 2.4 MG/DL — HIGH (ref 0.5–1.3)
EGFR: 21 ML/MIN/1.73M2 — LOW
GAS PNL BLDV: 139 MMOL/L — SIGNIFICANT CHANGE UP (ref 136–145)
GLUCOSE BLDC GLUCOMTR-MCNC: 117 MG/DL — HIGH (ref 70–99)
GLUCOSE BLDC GLUCOMTR-MCNC: 125 MG/DL — HIGH (ref 70–99)
GLUCOSE BLDC GLUCOMTR-MCNC: 140 MG/DL — HIGH (ref 70–99)
GLUCOSE BLDC GLUCOMTR-MCNC: 155 MG/DL — HIGH (ref 70–99)
GLUCOSE BLDV-MCNC: 131 MG/DL — HIGH (ref 70–99)
GLUCOSE SERPL-MCNC: 137 MG/DL — HIGH (ref 70–99)
HCO3 BLDV-SCNC: 36 MMOL/L — HIGH (ref 22–29)
HCT VFR BLD CALC: 28.9 % — LOW (ref 34.5–45)
HCT VFR BLDA CALC: 27 % — LOW (ref 34.5–46.5)
HGB BLD CALC-MCNC: 9 G/DL — LOW (ref 11.7–16.1)
HGB BLD-MCNC: 8.4 G/DL — LOW (ref 11.5–15.5)
INR BLD: 2.71 RATIO — HIGH (ref 0.88–1.16)
LACTATE BLDV-MCNC: 1 MMOL/L — SIGNIFICANT CHANGE UP (ref 0.5–2)
MAGNESIUM SERPL-MCNC: 2.4 MG/DL — SIGNIFICANT CHANGE UP (ref 1.6–2.6)
MCHC RBC-ENTMCNC: 29.1 GM/DL — LOW (ref 32–36)
MCHC RBC-ENTMCNC: 30.5 PG — SIGNIFICANT CHANGE UP (ref 27–34)
MCV RBC AUTO: 105.1 FL — HIGH (ref 80–100)
NRBC # BLD: 0 /100 WBCS — SIGNIFICANT CHANGE UP (ref 0–0)
NRBC # FLD: 0.02 K/UL — HIGH (ref 0–0)
PCO2 BLDV: 65 MMHG — HIGH (ref 39–52)
PH BLDV: 7.35 — SIGNIFICANT CHANGE UP (ref 7.32–7.43)
PHOSPHATE SERPL-MCNC: 3.9 MG/DL — SIGNIFICANT CHANGE UP (ref 2.5–4.5)
PLATELET # BLD AUTO: 333 K/UL — SIGNIFICANT CHANGE UP (ref 150–400)
PO2 BLDV: 70 MMHG — HIGH (ref 25–45)
POTASSIUM BLDV-SCNC: 5.2 MMOL/L — HIGH (ref 3.5–5.1)
POTASSIUM SERPL-MCNC: 5.1 MMOL/L — SIGNIFICANT CHANGE UP (ref 3.5–5.3)
POTASSIUM SERPL-SCNC: 5.1 MMOL/L — SIGNIFICANT CHANGE UP (ref 3.5–5.3)
PROTHROM AB SERPL-ACNC: 31.7 SEC — HIGH (ref 10.5–13.4)
RBC # BLD: 2.75 M/UL — LOW (ref 3.8–5.2)
RBC # FLD: 14.5 % — SIGNIFICANT CHANGE UP (ref 10.3–14.5)
SAO2 % BLDV: 93.5 % — HIGH (ref 67–88)
SODIUM SERPL-SCNC: 137 MMOL/L — SIGNIFICANT CHANGE UP (ref 135–145)
WBC # BLD: 5.84 K/UL — SIGNIFICANT CHANGE UP (ref 3.8–10.5)
WBC # FLD AUTO: 5.84 K/UL — SIGNIFICANT CHANGE UP (ref 3.8–10.5)

## 2023-02-15 PROCEDURE — 99232 SBSQ HOSP IP/OBS MODERATE 35: CPT

## 2023-02-15 RX ORDER — BUMETANIDE 0.25 MG/ML
2 INJECTION INTRAMUSCULAR; INTRAVENOUS DAILY
Refills: 0 | Status: DISCONTINUED | OUTPATIENT
Start: 2023-02-15 | End: 2023-02-16

## 2023-02-15 RX ORDER — WARFARIN SODIUM 2.5 MG/1
5 TABLET ORAL ONCE
Refills: 0 | Status: COMPLETED | OUTPATIENT
Start: 2023-02-15 | End: 2023-02-15

## 2023-02-15 RX ADMIN — Medication 325 MILLIGRAM(S): at 12:07

## 2023-02-15 RX ADMIN — Medication 1 SPRAY(S): at 18:14

## 2023-02-15 RX ADMIN — Medication 50 MILLIGRAM(S): at 22:15

## 2023-02-15 RX ADMIN — Medication 1 APPLICATION(S): at 18:15

## 2023-02-15 RX ADMIN — Medication 50 MILLIGRAM(S): at 05:03

## 2023-02-15 RX ADMIN — Medication 5 UNIT(S): at 18:15

## 2023-02-15 RX ADMIN — Medication 1 SPRAY(S): at 22:15

## 2023-02-15 RX ADMIN — BUMETANIDE 2 MILLIGRAM(S): 0.25 INJECTION INTRAMUSCULAR; INTRAVENOUS at 19:05

## 2023-02-15 RX ADMIN — Medication 5 MILLIGRAM(S): at 05:03

## 2023-02-15 RX ADMIN — ISOSORBIDE MONONITRATE 30 MILLIGRAM(S): 60 TABLET, EXTENDED RELEASE ORAL at 12:15

## 2023-02-15 RX ADMIN — Medication 5 UNIT(S): at 12:07

## 2023-02-15 RX ADMIN — Medication 30 MILLILITER(S): at 11:03

## 2023-02-15 RX ADMIN — Medication 3 MILLIGRAM(S): at 22:15

## 2023-02-15 RX ADMIN — BUDESONIDE AND FORMOTEROL FUMARATE DIHYDRATE 2 PUFF(S): 160; 4.5 AEROSOL RESPIRATORY (INHALATION) at 08:49

## 2023-02-15 RX ADMIN — Medication 1 SPRAY(S): at 05:03

## 2023-02-15 RX ADMIN — Medication 1 SPRAY(S): at 12:07

## 2023-02-15 RX ADMIN — Medication 5 UNIT(S): at 08:49

## 2023-02-15 RX ADMIN — Medication 50 MILLIGRAM(S): at 12:15

## 2023-02-15 RX ADMIN — WARFARIN SODIUM 5 MILLIGRAM(S): 2.5 TABLET ORAL at 22:15

## 2023-02-15 RX ADMIN — Medication 1 APPLICATION(S): at 05:52

## 2023-02-15 RX ADMIN — BUDESONIDE AND FORMOTEROL FUMARATE DIHYDRATE 2 PUFF(S): 160; 4.5 AEROSOL RESPIRATORY (INHALATION) at 22:15

## 2023-02-15 RX ADMIN — Medication 1 APPLICATION(S): at 12:06

## 2023-02-15 RX ADMIN — SIMVASTATIN 10 MILLIGRAM(S): 20 TABLET, FILM COATED ORAL at 22:15

## 2023-02-15 RX ADMIN — Medication 1 SPRAY(S): at 09:55

## 2023-02-15 RX ADMIN — AMIODARONE HYDROCHLORIDE 200 MILLIGRAM(S): 400 TABLET ORAL at 05:03

## 2023-02-15 RX ADMIN — Medication 1: at 18:16

## 2023-02-15 RX ADMIN — Medication 1 SPRAY(S): at 02:12

## 2023-02-15 RX ADMIN — BUMETANIDE 2 MILLIGRAM(S): 0.25 INJECTION INTRAMUSCULAR; INTRAVENOUS at 05:03

## 2023-02-15 RX ADMIN — Medication 25 UNIT(S): at 22:14

## 2023-02-15 RX ADMIN — LIDOCAINE 1 PATCH: 4 CREAM TOPICAL at 05:52

## 2023-02-15 NOTE — PROGRESS NOTE ADULT - SUBJECTIVE AND OBJECTIVE BOX
PULMONARY PROGRESS NOTE    DAMARI GUERRERO  [id number]    Patient is a 72y old  Female who presents with a chief complaint of Epistaxis (15 Feb 2023 10:12)      HPI:  on 3L  seen earlier this morning  no epistaxis  per RN- was on her BPAP all night  she is complaining of abdominal pain    ROS:   -    ACTIVE MEDICATION LIST:  MEDICATIONS  (STANDING):  aMIOdarone    Tablet 200 milliGRAM(s) Oral daily  budesonide 160 MICROgram(s)/formoterol 4.5 MICROgram(s) Inhaler 2 Puff(s) Inhalation two times a day  buMETAnide 2 milliGRAM(s) Oral daily  dextrose 5%. 1000 milliLiter(s) (100 mL/Hr) IV Continuous <Continuous>  dextrose 5%. 1000 milliLiter(s) (50 mL/Hr) IV Continuous <Continuous>  dextrose 50% Injectable 25 Gram(s) IV Push once  dextrose 50% Injectable 12.5 Gram(s) IV Push once  dextrose 50% Injectable 25 Gram(s) IV Push once  ferrous    sulfate 325 milliGRAM(s) Oral daily  glucagon  Injectable 1 milliGRAM(s) IntraMuscular once  hydrALAZINE 50 milliGRAM(s) Oral three times a day  insulin detemir injectable (LEVEMIR) 25 Unit(s) SubCutaneous at bedtime  insulin lispro (ADMELOG) corrective regimen sliding scale   SubCutaneous three times a day before meals  insulin lispro (ADMELOG) corrective regimen sliding scale   SubCutaneous at bedtime  insulin lispro Injectable (ADMELOG) 5 Unit(s) SubCutaneous three times a day before meals  isosorbide   mononitrate ER Tablet (IMDUR) 30 milliGRAM(s) Oral daily  pantoprazole    Tablet 40 milliGRAM(s) Oral before breakfast  petrolatum white Ointment 1 Application(s) Topical every 6 hours  predniSONE   Tablet 5 milliGRAM(s) Oral daily  simvastatin 10 milliGRAM(s) Oral at bedtime  sodium chloride 0.65% Nasal 1 Spray(s) Both Nostrils every 4 hours  warfarin 5 milliGRAM(s) Oral once    MEDICATIONS  (PRN):  acetaminophen     Tablet .. 650 milliGRAM(s) Oral every 6 hours PRN Temp greater or equal to 38C (100.4F), Mild Pain (1 - 3)  albuterol    90 MICROgram(s) HFA Inhaler 2 Puff(s) Inhalation every 6 hours PRN Bronchospasm  aluminum hydroxide/magnesium hydroxide/simethicone Suspension 30 milliLiter(s) Oral every 4 hours PRN Dyspepsia  dextrose Oral Gel 15 Gram(s) Oral once PRN Blood Glucose LESS THAN 70 milliGRAM(s)/deciliter  lidocaine   4% Patch 1 Patch Transdermal daily PRN pain  melatonin 3 milliGRAM(s) Oral at bedtime PRN Insomnia  oxymetazoline 0.05% Nasal Spray 2 Spray(s) Both Nostrils every 12 hours PRN Nose bleeds      EXAM:  Vital Signs Last 24 Hrs  T(C): 36.7 (15 Feb 2023 10:20), Max: 36.8 (14 Feb 2023 12:42)  T(F): 98 (15 Feb 2023 10:20), Max: 98.3 (14 Feb 2023 12:42)  HR: 62 (15 Feb 2023 10:20) (60 - 65)  BP: 111/50 (15 Feb 2023 10:20) (110/62 - 136/51)  BP(mean): --  RR: 20 (15 Feb 2023 10:20) (18 - 20)  SpO2: 99% (15 Feb 2023 10:20) (99% - 100%)    Parameters below as of 15 Feb 2023 10:20  Patient On (Oxygen Delivery Method): nasal cannula  O2 Flow (L/min): 3      GENERAL: The patient is awake and alert in no apparent distress.     LUNGS: Clear to auscultation without wheezing, rales or rhonchi; respirations unlabored                           8.4    5.84  )-----------( 333      ( 15 Feb 2023 07:27 )             28.9       02-15    137  |  94<L>  |  90<H>  ----------------------------<  137<H>  5.1   |  34<H>  |  2.40<H>    Ca    10.0      15 Feb 2023 07:27  Phos  3.9     02-15  Mg     2.40     02-15     < from: Xray Chest 1 View- PORTABLE-Urgent (Xray Chest 1 View- PORTABLE-Urgent .) (02.14.23 @ 20:36) >    ACC: [id number] EXAM:  XR CHEST PORTABLE URGENT 1V   ORDERED BY: MARY BETH LESLIE     PROCEDURE DATE:  02/14/2023          INTERPRETATION:  CLINICAL INFORMATION: Dyspnea    TIME OF EXAMINATION: February 14 at 7:48 PM    EXAM: Portable chest    FINDINGS:  Bilateral perihilar opacities with left effusion consistent with edema.   Heart is enlarged with sternotomy wires and mitral valve prosthesis.    No focal consolidations.    No pneumothorax.        COMPARISON: February 4, 2023        IMPRESSION: CHF.    --- End of Report ---      < end of copied text >      PROBLEM LIST:  72y Female with HEALTH ISSUES - PROBLEM Dx:  Epistaxis    Chronic atrial fibrillation    COPD, severe    CHF (congestive heart failure)    History of mitral valve replacement with mechanical valve    Hypertension    Need for prophylactic measure    Type 2 diabetes mellitus              RECS:  this morning abg reviewed- continue 16/7  xray shows fluid overload- cardio f/u  nc  bpap qhs  bronchodilators  prednisone 5          Please call with any questions.    Irma Eaton, DO  [facility] Pulmonary/Sleep Medicine  [phone]

## 2023-02-15 NOTE — PROGRESS NOTE ADULT - SUBJECTIVE AND OBJECTIVE BOX
Patient seen and examined this am. No new events    MEDICATIONS:    acetaminophen     Tablet .. 650 milliGRAM(s) Oral every 6 hours PRN  albuterol    90 MICROgram(s) HFA Inhaler 2 Puff(s) Inhalation every 6 hours PRN  aluminum hydroxide/magnesium hydroxide/simethicone Suspension 30 milliLiter(s) Oral every 4 hours PRN  aMIOdarone    Tablet 200 milliGRAM(s) Oral daily  budesonide 160 MICROgram(s)/formoterol 4.5 MICROgram(s) Inhaler 2 Puff(s) Inhalation two times a day  buMETAnide 2 milliGRAM(s) Oral daily  dextrose 5%. 1000 milliLiter(s) IV Continuous <Continuous>  dextrose 5%. 1000 milliLiter(s) IV Continuous <Continuous>  dextrose 50% Injectable 25 Gram(s) IV Push once  dextrose 50% Injectable 12.5 Gram(s) IV Push once  dextrose 50% Injectable 25 Gram(s) IV Push once  dextrose Oral Gel 15 Gram(s) Oral once PRN  ferrous    sulfate 325 milliGRAM(s) Oral daily  glucagon  Injectable 1 milliGRAM(s) IntraMuscular once  hydrALAZINE 50 milliGRAM(s) Oral three times a day  insulin detemir injectable (LEVEMIR) 25 Unit(s) SubCutaneous at bedtime  insulin lispro (ADMELOG) corrective regimen sliding scale   SubCutaneous three times a day before meals  insulin lispro (ADMELOG) corrective regimen sliding scale   SubCutaneous at bedtime  insulin lispro Injectable (ADMELOG) 5 Unit(s) SubCutaneous three times a day before meals  isosorbide   mononitrate ER Tablet (IMDUR) 30 milliGRAM(s) Oral daily  lidocaine   4% Patch 1 Patch Transdermal daily PRN  melatonin 3 milliGRAM(s) Oral at bedtime PRN  oxymetazoline 0.05% Nasal Spray 2 Spray(s) Both Nostrils every 12 hours PRN  pantoprazole    Tablet 40 milliGRAM(s) Oral before breakfast  petrolatum white Ointment 1 Application(s) Topical every 6 hours  predniSONE   Tablet 5 milliGRAM(s) Oral daily  simvastatin 10 milliGRAM(s) Oral at bedtime  sodium chloride 0.65% Nasal 1 Spray(s) Both Nostrils every 4 hours  warfarin 5 milliGRAM(s) Oral once      LABS:                          8.4    5.84  )-----------( 333      ( 15 Feb 2023 07:27 )             28.9     02-15    137  |  94<L>  |  90<H>  ----------------------------<  137<H>  5.1   |  34<H>  |  2.40<H>    Ca    10.0      15 Feb 2023 07:27  Phos  3.9     02-15  Mg     2.40     02-15      CAPILLARY BLOOD GLUCOSE      POCT Blood Glucose.: 140 mg/dL (15 Feb 2023 08:05)  POCT Blood Glucose.: 146 mg/dL (14 Feb 2023 21:41)  POCT Blood Glucose.: 100 mg/dL (14 Feb 2023 17:31)  POCT Blood Glucose.: 104 mg/dL (14 Feb 2023 12:13)    PT/INR - ( 15 Feb 2023 07:27 )   PT: 31.7 sec;   INR: 2.71 ratio             I&O's Summary    14 Feb 2023 07:01  -  15 Feb 2023 07:00  --------------------------------------------------------  IN: 680 mL / OUT: 0 mL / NET: 680 mL      Vital Signs Last 24 Hrs  T(C): 36.5 (15 Feb 2023 05:00), Max: 36.8 (14 Feb 2023 12:42)  T(F): 97.7 (15 Feb 2023 05:00), Max: 98.3 (14 Feb 2023 12:42)  HR: 62 (15 Feb 2023 07:52) (60 - 65)  BP: 124/63 (15 Feb 2023 05:00) (110/62 - 136/51)  BP(mean): --  RR: 18 (15 Feb 2023 05:00) (18 - 18)  SpO2: 99% (15 Feb 2023 07:52) (99% - 100%)    Parameters below as of 15 Feb 2023 05:00  Patient On (Oxygen Delivery Method): nasal cannula  O2 Flow (L/min): 3        On Neurological Examination:    Mental Status - Patient is alert, awake, oriented X3. fluent, names, no dysarthria no aphasia Follows commands well and able to answer questions appropriately. Mood and affect  normal    Cranial Nerves - PERRL, EOMI, VFF, V1-V3 intact, no gross facial asymmetry, tongue/uvula midline    Motor Exam -   no drift    Sensory    Intact to light touch and pinprick bilaterally    Coord: FTN intact bilaterally     Gait -  normal            RADIOLOGY

## 2023-02-15 NOTE — CHART NOTE - NSCHARTNOTEFT_GEN_A_CORE
Discussed CXR findings and patients clinical status with cardiology, nephrology, and Dr. Newman.  Will switch PO bumex to IV bumex 2mg daily, first dose now for diuresis.   Will check proBNP in AM.  Will follow up cardiology and renal recs in morning.   Additionally, given ongoing dizziness that is almost constant (no focal deficits); will check CT head for further evaluation.   Patient otherwise stable, will continue to monitor.

## 2023-02-15 NOTE — PROGRESS NOTE ADULT - SUBJECTIVE AND OBJECTIVE BOX
SUBJECTIVE / OVERNIGHT EVENTS:pt seen and examined, pt more alert communicative today  02-15-23    MEDICATIONS  (STANDING):  aMIOdarone    Tablet 200 milliGRAM(s) Oral daily  budesonide 160 MICROgram(s)/formoterol 4.5 MICROgram(s) Inhaler 2 Puff(s) Inhalation two times a day  buMETAnide Injectable 2 milliGRAM(s) IV Push daily  dextrose 5%. 1000 milliLiter(s) (50 mL/Hr) IV Continuous <Continuous>  dextrose 5%. 1000 milliLiter(s) (100 mL/Hr) IV Continuous <Continuous>  dextrose 50% Injectable 25 Gram(s) IV Push once  dextrose 50% Injectable 12.5 Gram(s) IV Push once  dextrose 50% Injectable 25 Gram(s) IV Push once  ferrous    sulfate 325 milliGRAM(s) Oral daily  glucagon  Injectable 1 milliGRAM(s) IntraMuscular once  hydrALAZINE 50 milliGRAM(s) Oral three times a day  insulin detemir injectable (LEVEMIR) 25 Unit(s) SubCutaneous at bedtime  insulin lispro (ADMELOG) corrective regimen sliding scale   SubCutaneous three times a day before meals  insulin lispro (ADMELOG) corrective regimen sliding scale   SubCutaneous at bedtime  insulin lispro Injectable (ADMELOG) 5 Unit(s) SubCutaneous three times a day before meals  isosorbide   mononitrate ER Tablet (IMDUR) 30 milliGRAM(s) Oral daily  pantoprazole    Tablet 40 milliGRAM(s) Oral before breakfast  petrolatum white Ointment 1 Application(s) Topical every 6 hours  predniSONE   Tablet 5 milliGRAM(s) Oral daily  simvastatin 10 milliGRAM(s) Oral at bedtime  sodium chloride 0.65% Nasal 1 Spray(s) Both Nostrils every 4 hours  warfarin 5 milliGRAM(s) Oral once    MEDICATIONS  (PRN):  acetaminophen     Tablet .. 650 milliGRAM(s) Oral every 6 hours PRN Temp greater or equal to 38C (100.4F), Mild Pain (1 - 3)  albuterol    90 MICROgram(s) HFA Inhaler 2 Puff(s) Inhalation every 6 hours PRN Bronchospasm  aluminum hydroxide/magnesium hydroxide/simethicone Suspension 30 milliLiter(s) Oral every 4 hours PRN Dyspepsia  dextrose Oral Gel 15 Gram(s) Oral once PRN Blood Glucose LESS THAN 70 milliGRAM(s)/deciliter  lidocaine   4% Patch 1 Patch Transdermal daily PRN pain  melatonin 3 milliGRAM(s) Oral at bedtime PRN Insomnia  oxymetazoline 0.05% Nasal Spray 2 Spray(s) Both Nostrils every 12 hours PRN Nose bleeds    Vital Signs Last 24 Hrs  T(C): 36.6 (02-15-23 @ 19:17), Max: 36.7 (02-15-23 @ 10:20)  T(F): 97.9 (02-15-23 @ 19:17), Max: 98 (02-15-23 @ 10:20)  HR: 72 (02-15-23 @ 19:17) (59 - 72)  BP: 126/53 (02-15-23 @ 19:17) (110/62 - 152/60)  BP(mean): --  RR: 18 (02-15-23 @ 19:17) (18 - 20)  SpO2: 97% (02-15-23 @ 19:17) (97% - 100%)    Orthostatic VS  02-14-23 @ 18:45  Lying BP: --/-- HR: 66  Sitting BP: 123/77 HR: 71  Standing BP: 127/67 HR: --  Site: upper left arm  Mode: electronic            Constitutional: No fever, fatigue  Skin: No rash.  Eyes: No recent vision problems or eye pain.  ENT: No congestion, ear pain, or sore throat.  Cardiovascular: No chest pain or palpation.  Respiratory: No cough, shortness of breath, congestion, or wheezing.  Gastrointestinal: No abdominal pain, nausea, vomiting, or diarrhea.  Genitourinary: No dysuria.  Musculoskeletal: No joint swelling.  Neurologic: No headache.    PHYSICAL EXAM:  GENERAL: NAD  EYES: EOMI, PERRLA  NECK: Supple, No JVD  CHEST/LUNG: dec breath sounds at bases  HEART:  S1 , S2 +  ABDOMEN: soft, bs+  EXTREMITIES:  edema+  NEUROLOGY:alert awake oriented    LABS:  02-15    137  |  94<L>  |  90<H>  ----------------------------<  137<H>  5.1   |  34<H>  |  2.40<H>    Ca    10.0      15 Feb 2023 07:27  Phos  3.9     02-15  Mg     2.40     02-15      Creatinine Trend: 2.40 <--, 2.50 <--, 2.56 <--, 2.73 <--, 2.54 <--, 2.60 <--, 2.64 <--, 2.72 <--                        8.4    5.84  )-----------( 333      ( 15 Feb 2023 07:27 )             28.9     Urine Studies:              PT/INR - ( 15 Feb 2023 07:27 )   PT: 31.7 sec;   INR: 2.71 ratio               PT/INR - ( 14 Feb 2023 06:23 )   PT: 30.6 sec;   INR: 2.61 ratio         PTT - ( 13 Feb 2023 06:35 )  PTT:45.7 sec      Imaging Personally Reviewed:yes    Consultant(s) Notes Reviewed:  yes    Care Discussed with Consultants/Other Providers:yes

## 2023-02-16 LAB
ANION GAP SERPL CALC-SCNC: 9 MMOL/L — SIGNIFICANT CHANGE UP (ref 7–14)
APPEARANCE UR: CLEAR — SIGNIFICANT CHANGE UP
BILIRUB UR-MCNC: NEGATIVE — SIGNIFICANT CHANGE UP
BUN SERPL-MCNC: 91 MG/DL — HIGH (ref 7–23)
CALCIUM SERPL-MCNC: 9.8 MG/DL — SIGNIFICANT CHANGE UP (ref 8.4–10.5)
CHLORIDE SERPL-SCNC: 96 MMOL/L — LOW (ref 98–107)
CO2 SERPL-SCNC: 35 MMOL/L — HIGH (ref 22–31)
COLOR SPEC: SIGNIFICANT CHANGE UP
CREAT ?TM UR-MCNC: 42 MG/DL — SIGNIFICANT CHANGE UP
CREAT SERPL-MCNC: 2.42 MG/DL — HIGH (ref 0.5–1.3)
DIFF PNL FLD: NEGATIVE — SIGNIFICANT CHANGE UP
EGFR: 20 ML/MIN/1.73M2 — LOW
GLUCOSE BLDC GLUCOMTR-MCNC: 101 MG/DL — HIGH (ref 70–99)
GLUCOSE BLDC GLUCOMTR-MCNC: 105 MG/DL — HIGH (ref 70–99)
GLUCOSE BLDC GLUCOMTR-MCNC: 93 MG/DL — SIGNIFICANT CHANGE UP (ref 70–99)
GLUCOSE BLDC GLUCOMTR-MCNC: 94 MG/DL — SIGNIFICANT CHANGE UP (ref 70–99)
GLUCOSE BLDC GLUCOMTR-MCNC: 98 MG/DL — SIGNIFICANT CHANGE UP (ref 70–99)
GLUCOSE SERPL-MCNC: 79 MG/DL — SIGNIFICANT CHANGE UP (ref 70–99)
GLUCOSE UR QL: NEGATIVE — SIGNIFICANT CHANGE UP
HCT VFR BLD CALC: 27.6 % — LOW (ref 34.5–45)
HGB BLD-MCNC: 7.8 G/DL — LOW (ref 11.5–15.5)
INR BLD: 3.01 RATIO — HIGH (ref 0.88–1.16)
KETONES UR-MCNC: NEGATIVE — SIGNIFICANT CHANGE UP
LEUKOCYTE ESTERASE UR-ACNC: NEGATIVE — SIGNIFICANT CHANGE UP
MAGNESIUM SERPL-MCNC: 2.5 MG/DL — SIGNIFICANT CHANGE UP (ref 1.6–2.6)
MCHC RBC-ENTMCNC: 28.3 GM/DL — LOW (ref 32–36)
MCHC RBC-ENTMCNC: 29.8 PG — SIGNIFICANT CHANGE UP (ref 27–34)
MCV RBC AUTO: 105.3 FL — HIGH (ref 80–100)
NITRITE UR-MCNC: NEGATIVE — SIGNIFICANT CHANGE UP
NRBC # BLD: 0 /100 WBCS — SIGNIFICANT CHANGE UP (ref 0–0)
NRBC # FLD: 0.02 K/UL — HIGH (ref 0–0)
NT-PROBNP SERPL-SCNC: 2896 PG/ML — HIGH
PH UR: 6.5 — SIGNIFICANT CHANGE UP (ref 5–8)
PHOSPHATE SERPL-MCNC: 3.4 MG/DL — SIGNIFICANT CHANGE UP (ref 2.5–4.5)
PLATELET # BLD AUTO: 329 K/UL — SIGNIFICANT CHANGE UP (ref 150–400)
POTASSIUM SERPL-MCNC: 4.9 MMOL/L — SIGNIFICANT CHANGE UP (ref 3.5–5.3)
POTASSIUM SERPL-SCNC: 4.9 MMOL/L — SIGNIFICANT CHANGE UP (ref 3.5–5.3)
PROT UR-MCNC: NEGATIVE — SIGNIFICANT CHANGE UP
PROTHROM AB SERPL-ACNC: 35.3 SEC — HIGH (ref 10.5–13.4)
RBC # BLD: 2.62 M/UL — LOW (ref 3.8–5.2)
RBC # FLD: 14.5 % — SIGNIFICANT CHANGE UP (ref 10.3–14.5)
RBC CASTS # UR COMP ASSIST: 0 /HPF — SIGNIFICANT CHANGE UP (ref 0–4)
SODIUM SERPL-SCNC: 140 MMOL/L — SIGNIFICANT CHANGE UP (ref 135–145)
SP GR SPEC: 1.01 — SIGNIFICANT CHANGE UP (ref 1.01–1.05)
UROBILINOGEN FLD QL: SIGNIFICANT CHANGE UP
UUN UR-MCNC: 370 MG/DL — SIGNIFICANT CHANGE UP
WBC # BLD: 5.73 K/UL — SIGNIFICANT CHANGE UP (ref 3.8–10.5)
WBC # FLD AUTO: 5.73 K/UL — SIGNIFICANT CHANGE UP (ref 3.8–10.5)
WBC UR QL: 0 /HPF — SIGNIFICANT CHANGE UP (ref 0–5)

## 2023-02-16 PROCEDURE — 70450 CT HEAD/BRAIN W/O DYE: CPT | Mod: 26

## 2023-02-16 RX ORDER — BUMETANIDE 0.25 MG/ML
2 INJECTION INTRAMUSCULAR; INTRAVENOUS
Refills: 0 | Status: COMPLETED | OUTPATIENT
Start: 2023-02-16 | End: 2023-02-19

## 2023-02-16 RX ORDER — BUMETANIDE 0.25 MG/ML
2 INJECTION INTRAMUSCULAR; INTRAVENOUS ONCE
Refills: 0 | Status: COMPLETED | OUTPATIENT
Start: 2023-02-16 | End: 2023-02-17

## 2023-02-16 RX ORDER — WARFARIN SODIUM 2.5 MG/1
5 TABLET ORAL ONCE
Refills: 0 | Status: COMPLETED | OUTPATIENT
Start: 2023-02-16 | End: 2023-02-17

## 2023-02-16 RX ADMIN — Medication 1 APPLICATION(S): at 05:32

## 2023-02-16 RX ADMIN — BUDESONIDE AND FORMOTEROL FUMARATE DIHYDRATE 2 PUFF(S): 160; 4.5 AEROSOL RESPIRATORY (INHALATION) at 22:59

## 2023-02-16 RX ADMIN — Medication 50 MILLIGRAM(S): at 05:36

## 2023-02-16 RX ADMIN — Medication 1 SPRAY(S): at 23:00

## 2023-02-16 RX ADMIN — AMIODARONE HYDROCHLORIDE 200 MILLIGRAM(S): 400 TABLET ORAL at 05:36

## 2023-02-16 RX ADMIN — LIDOCAINE 1 PATCH: 4 CREAM TOPICAL at 12:18

## 2023-02-16 RX ADMIN — BUMETANIDE 2 MILLIGRAM(S): 0.25 INJECTION INTRAMUSCULAR; INTRAVENOUS at 05:34

## 2023-02-16 RX ADMIN — Medication 1 SPRAY(S): at 12:17

## 2023-02-16 RX ADMIN — Medication 50 MILLIGRAM(S): at 12:18

## 2023-02-16 RX ADMIN — Medication 1 APPLICATION(S): at 18:24

## 2023-02-16 RX ADMIN — Medication 1 SPRAY(S): at 05:32

## 2023-02-16 RX ADMIN — Medication 5 UNIT(S): at 09:15

## 2023-02-16 RX ADMIN — Medication 1 APPLICATION(S): at 12:16

## 2023-02-16 RX ADMIN — Medication 325 MILLIGRAM(S): at 12:17

## 2023-02-16 RX ADMIN — Medication 5 UNIT(S): at 18:23

## 2023-02-16 RX ADMIN — Medication 1 SPRAY(S): at 09:16

## 2023-02-16 RX ADMIN — Medication 25 UNIT(S): at 23:07

## 2023-02-16 RX ADMIN — ISOSORBIDE MONONITRATE 30 MILLIGRAM(S): 60 TABLET, EXTENDED RELEASE ORAL at 12:17

## 2023-02-16 RX ADMIN — Medication 1 APPLICATION(S): at 01:01

## 2023-02-16 RX ADMIN — BUDESONIDE AND FORMOTEROL FUMARATE DIHYDRATE 2 PUFF(S): 160; 4.5 AEROSOL RESPIRATORY (INHALATION) at 09:16

## 2023-02-16 RX ADMIN — LIDOCAINE 1 PATCH: 4 CREAM TOPICAL at 19:58

## 2023-02-16 RX ADMIN — Medication 5 UNIT(S): at 12:18

## 2023-02-16 RX ADMIN — Medication 3 MILLIGRAM(S): at 23:08

## 2023-02-16 RX ADMIN — Medication 1 SPRAY(S): at 01:01

## 2023-02-16 RX ADMIN — Medication 1 SPRAY(S): at 18:22

## 2023-02-16 RX ADMIN — Medication 5 MILLIGRAM(S): at 05:34

## 2023-02-16 RX ADMIN — SIMVASTATIN 10 MILLIGRAM(S): 20 TABLET, FILM COATED ORAL at 23:09

## 2023-02-16 NOTE — CONSULT NOTE ADULT - ASSESSMENT
72 year old Female with history of COPD, CHF presents with weakness. Nephrology consulted for elevated Scr.    1) MISAEL: likely CRS. Check UA, urine urea and urine creatinine. Check bladder scan. Avoid nephrotoxins.    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 Outpatient CKD work up. Monitor electrolytes.    3) HTN with CKD: BP controlled. Continue with current medications. Monitor BP.    4) LE edema: Improving. Increase bumex to 2 mg IV twice daily. Will convert to oral on discharge (home dose). Patient with elevated serum bicarbonate but with mixed disorder (respiratory acidosis and metabolic alkalosis with overall acidemia on blood gas). Prior TTE with mild to moderate global LVSD. Monitor UO.    5) Anemia: Hb low. Check AM iron stores. CHACORTA pending results. Monitor Hb.    6) L renal mass: Outpatient Urology follow up.      [facility] NEPHROLOGY  Rodrigue Amador M.D.  Rob Perez D.O.  Ana Song M.D.  Lucrecia López, MSN, ANP-C    Telephone: [phone]  Facsimile: [phone]    [address] [place] [address]

## 2023-02-16 NOTE — PROGRESS NOTE ADULT - SUBJECTIVE AND OBJECTIVE BOX
SUBJECTIVE / OVERNIGHT EVENTS:pt seen and examined,   23    MEDICATIONS  (STANDING):  aMIOdarone    Tablet 200 milliGRAM(s) Oral daily  budesonide 160 MICROgram(s)/formoterol 4.5 MICROgram(s) Inhaler 2 Puff(s) Inhalation two times a day  buMETAnide 2 milliGRAM(s) Oral once  buMETAnide Injectable 2 milliGRAM(s) IV Push two times a day  dextrose 5%. 1000 milliLiter(s) (100 mL/Hr) IV Continuous <Continuous>  dextrose 5%. 1000 milliLiter(s) (50 mL/Hr) IV Continuous <Continuous>  dextrose 50% Injectable 25 Gram(s) IV Push once  dextrose 50% Injectable 12.5 Gram(s) IV Push once  dextrose 50% Injectable 25 Gram(s) IV Push once  ferrous    sulfate 325 milliGRAM(s) Oral daily  glucagon  Injectable 1 milliGRAM(s) IntraMuscular once  hydrALAZINE 50 milliGRAM(s) Oral three times a day  insulin detemir injectable (LEVEMIR) 25 Unit(s) SubCutaneous at bedtime  insulin lispro (ADMELOG) corrective regimen sliding scale   SubCutaneous three times a day before meals  insulin lispro (ADMELOG) corrective regimen sliding scale   SubCutaneous at bedtime  insulin lispro Injectable (ADMELOG) 5 Unit(s) SubCutaneous three times a day before meals  isosorbide   mononitrate ER Tablet (IMDUR) 30 milliGRAM(s) Oral daily  pantoprazole    Tablet 40 milliGRAM(s) Oral before breakfast  petrolatum white Ointment 1 Application(s) Topical every 6 hours  predniSONE   Tablet 5 milliGRAM(s) Oral daily  simvastatin 10 milliGRAM(s) Oral at bedtime  sodium chloride 0.65% Nasal 1 Spray(s) Both Nostrils every 4 hours  warfarin 5 milliGRAM(s) Oral once    MEDICATIONS  (PRN):  acetaminophen     Tablet .. 650 milliGRAM(s) Oral every 6 hours PRN Temp greater or equal to 38C (100.4F), Mild Pain (1 - 3)  albuterol    90 MICROgram(s) HFA Inhaler 2 Puff(s) Inhalation every 6 hours PRN Bronchospasm  aluminum hydroxide/magnesium hydroxide/simethicone Suspension 30 milliLiter(s) Oral every 4 hours PRN Dyspepsia  dextrose Oral Gel 15 Gram(s) Oral once PRN Blood Glucose LESS THAN 70 milliGRAM(s)/deciliter  lidocaine   4% Patch 1 Patch Transdermal daily PRN pain  melatonin 3 milliGRAM(s) Oral at bedtime PRN Insomnia  oxymetazoline 0.05% Nasal Spray 2 Spray(s) Both Nostrils every 12 hours PRN Nose bleeds    Vital Signs Last 24 Hrs  T(C): 36.9 (23 @ 21:50), Max: 36.9 (23 @ 21:50)  T(F): 98.4 (23 @ 21:50), Max: 98.4 (23 @ 21:50)  HR: 60 (23 @ 21:50) (60 - 68)  BP: 106/50 (23 @ 21:50) (106/50 - 124/57)  BP(mean): --  RR: 20 (23 @ 21:50) (16 - 20)  SpO2: 100% (23 @ 21:50) (100% - 100%)            Constitutional: No fever, fatigue  Skin: No rash.  Eyes: No recent vision problems or eye pain.  ENT: No congestion, ear pain, or sore throat.  Cardiovascular: No chest pain or palpation.  Respiratory: No cough, shortness of breath, congestion, or wheezing.  Gastrointestinal: No abdominal pain, nausea, vomiting, or diarrhea.  Genitourinary: No dysuria.  Musculoskeletal: No joint swelling.  Neurologic: No headache.    PHYSICAL EXAM:  GENERAL: NAD  EYES: EOMI, PERRLA  NECK: Supple, No JVD  CHEST/LUNG: dec breath sounds at bases  HEART:  S1 , S2 +  ABDOMEN: soft, bs+  EXTREMITIES:  edema+  NEUROLOGY:alert awake oriented    LABS:      140  |  96<L>  |  91<H>  ----------------------------<  79  4.9   |  35<H>  |  2.42<H>    Ca    9.8      2023 05:30  Phos  3.4       Mg     2.50           Creatinine Trend: 2.42 <--, 2.40 <--, 2.50 <--, 2.56 <--, 2.73 <--, 2.54 <--, 2.60 <--, 2.64 <--                        7.8    5.73  )-----------( 329      ( 2023 05:30 )             27.6     Urine Studies:  Urinalysis Basic - ( 2023 12:06 )    Color: Light Yellow / Appearance: Clear / S.011 / pH:   Gluc:  / Ketone: Negative  / Bili: Negative / Urobili: <2 mg/dL   Blood:  / Protein: Negative / Nitrite: Negative   Leuk Esterase: Negative / RBC: 0 /HPF / WBC 0 /HPF   Sq Epi:  / Non Sq Epi:  / Bacteria:       Creatinine, Random Urine: 42 mg/dL ( @ 12:06)            PT/INR - ( 2023 05:30 )   PT: 35.3 sec;   INR: 3.01 ratio                      PT/INR - ( 2023 06:23 )   PT: 30.6 sec;   INR: 2.61 ratio         PTT - ( 2023 06:35 )  PTT:45.7 sec      Imaging Personally Reviewed:yes    Consultant(s) Notes Reviewed:  yes    Care Discussed with Consultants/Other Providers:yes

## 2023-02-16 NOTE — CONSULT NOTE ADULT - SUBJECTIVE AND OBJECTIVE BOX
[facility] NEPHROLOGY- CONSULTATION NOTE    72 year old Female with history of below presents with epistaxis. Nephrology consulted for elevated Scr.    Patient known to our practice for h/o CKD-4 on prior admissions with baseline Scr 1.8-2.0. Patient had been on bumex 2 mg PO twice daily as an outpatient for h/o CHF which was decreased to daily on admission. Patient with MISAEL on CKD-4 on current admission with hospital course complicated by SOB with CXR revealing pulmonary edema s/p bumex 2 mg IV X 1 dose on 2/15. Patient reports improvement in symptoms since that time.      REVIEW OF SYSTEMS:  Gen: no fevers  HEENT: no rhinorrhea  Neck: no sore throat  Cards: no chest pain  Resp: + dyspnea improving  GI: no nausea or vomiting or diarrhea  : no dysuria or hematuria  Vascular: + LE edema improving  Derm: no rashes  Neuro: no numbness/tingling    No Known Allergies      Home Medications Reviewed  Hospital Medications:   MEDICATIONS  (STANDING):  acetaminophen     Tablet .. 975 milliGRAM(s) Oral once  budesonide 160 MICROgram(s)/formoterol 4.5 MICROgram(s) Inhaler 2 Puff(s) Inhalation two times a day  ferrous    sulfate 325 milliGRAM(s) Oral daily  heparin   Injectable 5000 Unit(s) SubCutaneous every 8 hours  hydrALAZINE 25 milliGRAM(s) Oral every 8 hours  lidocaine   4% Patch 1 Patch Transdermal every 24 hours  metoprolol succinate ER 25 milliGRAM(s) Oral daily  pantoprazole    Tablet 40 milliGRAM(s) Oral before breakfast  simvastatin 10 milliGRAM(s) Oral at bedtime      PAST MEDICAL & SURGICAL HISTORY:  Atrial fibrillation  S/P Ablation    Pulmonary hypertension    MIGUEL (obstructive sleep apnea)    COPD (chronic obstructive pulmonary disease)  on home O2    CHF (congestive heart failure)    HTN (hypertension)    Gout    Mitral valve replaced    Tricuspid valve replaced    CHF (congestive heart failure)    Hypertension    COPD (chronic obstructive pulmonary disease)    History of mitral valve replacement with mechanical valve    Tricuspid valve replaced  mechanical    No significant past surgical history        FAMILY HISTORY:  Family history of hypertension (Father, Sibling)    Family history of stroke    Family history of hypertension (Mother)        SOCIAL HISTORY:  Denies toxic substance use       VITALS:  T(F): 98.3 (02-16-23 @ 05:36), Max: 98.3 (02-16-23 @ 05:36)  HR: 66 (02-16-23 @ 07:27)  BP: 124/53 (02-16-23 @ 05:36)  RR: 19 (02-16-23 @ 05:36)  SpO2: 100% (02-16-23 @ 07:27)  Wt(kg): --      PHYSICAL EXAM:  Gen: NAD, calm  HEENT: MMM  Neck: no JVD  Cards: RRR, +S1/S2, no M/G/R  Resp: CTA B/L  GI: soft, NT/ND, NABS  : no CVA tenderness  Vascular: 1+ LE edema B/L  Derm: no rashes  Neuro: non-focal      LABS:  02-16    140  |  96<L>  |  91<H>  ----------------------------<  79  4.9   |  35<H>  |  2.42<H>    Ca    9.8      16 Feb 2023 05:30  Phos  3.4     02-16  Mg     2.50     02-16      Creatinine Trend: 2.42 <--, 2.40 <--, 2.50 <--, 2.56 <--, 2.73 <--, 2.54 <--, 2.60 <--, 2.64 <--                        7.8    5.73  )-----------( 329      ( 16 Feb 2023 05:30 )             27.6     Urine Studies:        < from: Xray Chest 1 View- PORTABLE-Urgent (Xray Chest 1 View- PORTABLE-Urgent .) (02.14.23 @ 20:36) >  IMPRESSION: CHF.    --- End of Report ---    < end of copied text >

## 2023-02-17 LAB
ANION GAP SERPL CALC-SCNC: 10 MMOL/L — SIGNIFICANT CHANGE UP (ref 7–14)
BUN SERPL-MCNC: 88 MG/DL — HIGH (ref 7–23)
CALCIUM SERPL-MCNC: 9.8 MG/DL — SIGNIFICANT CHANGE UP (ref 8.4–10.5)
CHLORIDE SERPL-SCNC: 95 MMOL/L — LOW (ref 98–107)
CO2 SERPL-SCNC: 35 MMOL/L — HIGH (ref 22–31)
CREAT SERPL-MCNC: 2.47 MG/DL — HIGH (ref 0.5–1.3)
EGFR: 20 ML/MIN/1.73M2 — LOW
FERRITIN SERPL-MCNC: 308 NG/ML — HIGH (ref 15–150)
GLUCOSE BLDC GLUCOMTR-MCNC: 100 MG/DL — HIGH (ref 70–99)
GLUCOSE BLDC GLUCOMTR-MCNC: 121 MG/DL — HIGH (ref 70–99)
GLUCOSE BLDC GLUCOMTR-MCNC: 135 MG/DL — HIGH (ref 70–99)
GLUCOSE BLDC GLUCOMTR-MCNC: 90 MG/DL — SIGNIFICANT CHANGE UP (ref 70–99)
GLUCOSE SERPL-MCNC: 75 MG/DL — SIGNIFICANT CHANGE UP (ref 70–99)
HCT VFR BLD CALC: 28.5 % — LOW (ref 34.5–45)
HGB BLD-MCNC: 8 G/DL — LOW (ref 11.5–15.5)
INR BLD: 2.46 RATIO — HIGH (ref 0.88–1.16)
IRON SATN MFR SERPL: 18 % — SIGNIFICANT CHANGE UP (ref 14–50)
IRON SATN MFR SERPL: 52 UG/DL — SIGNIFICANT CHANGE UP (ref 30–160)
MAGNESIUM SERPL-MCNC: 2.6 MG/DL — SIGNIFICANT CHANGE UP (ref 1.6–2.6)
MCHC RBC-ENTMCNC: 28.1 GM/DL — LOW (ref 32–36)
MCHC RBC-ENTMCNC: 30 PG — SIGNIFICANT CHANGE UP (ref 27–34)
MCV RBC AUTO: 106.7 FL — HIGH (ref 80–100)
NRBC # BLD: 0 /100 WBCS — SIGNIFICANT CHANGE UP (ref 0–0)
NRBC # FLD: 0.02 K/UL — HIGH (ref 0–0)
PHOSPHATE SERPL-MCNC: 3.5 MG/DL — SIGNIFICANT CHANGE UP (ref 2.5–4.5)
PLATELET # BLD AUTO: 343 K/UL — SIGNIFICANT CHANGE UP (ref 150–400)
POTASSIUM SERPL-MCNC: 5.1 MMOL/L — SIGNIFICANT CHANGE UP (ref 3.5–5.3)
POTASSIUM SERPL-SCNC: 5.1 MMOL/L — SIGNIFICANT CHANGE UP (ref 3.5–5.3)
PROTHROM AB SERPL-ACNC: 28.8 SEC — HIGH (ref 10.5–13.4)
RBC # BLD: 2.67 M/UL — LOW (ref 3.8–5.2)
RBC # FLD: 14.5 % — SIGNIFICANT CHANGE UP (ref 10.3–14.5)
SODIUM SERPL-SCNC: 140 MMOL/L — SIGNIFICANT CHANGE UP (ref 135–145)
TIBC SERPL-MCNC: 283 UG/DL — SIGNIFICANT CHANGE UP (ref 220–430)
UIBC SERPL-MCNC: 231 UG/DL — SIGNIFICANT CHANGE UP (ref 110–370)
WBC # BLD: 5.78 K/UL — SIGNIFICANT CHANGE UP (ref 3.8–10.5)
WBC # FLD AUTO: 5.78 K/UL — SIGNIFICANT CHANGE UP (ref 3.8–10.5)

## 2023-02-17 PROCEDURE — 99232 SBSQ HOSP IP/OBS MODERATE 35: CPT

## 2023-02-17 RX ORDER — WARFARIN SODIUM 2.5 MG/1
5 TABLET ORAL ONCE
Refills: 0 | Status: COMPLETED | OUTPATIENT
Start: 2023-02-17 | End: 2023-02-17

## 2023-02-17 RX ORDER — BUMETANIDE 0.25 MG/ML
2 INJECTION INTRAMUSCULAR; INTRAVENOUS ONCE
Refills: 0 | Status: COMPLETED | OUTPATIENT
Start: 2023-02-17 | End: 2023-02-17

## 2023-02-17 RX ORDER — ALBUMIN HUMAN 25 %
50 VIAL (ML) INTRAVENOUS EVERY 6 HOURS
Refills: 0 | Status: COMPLETED | OUTPATIENT
Start: 2023-02-17 | End: 2023-02-19

## 2023-02-17 RX ORDER — ERYTHROPOIETIN 10000 [IU]/ML
10000 INJECTION, SOLUTION INTRAVENOUS; SUBCUTANEOUS ONCE
Refills: 0 | Status: COMPLETED | OUTPATIENT
Start: 2023-02-17 | End: 2023-02-17

## 2023-02-17 RX ORDER — IRON SUCROSE 20 MG/ML
200 INJECTION, SOLUTION INTRAVENOUS ONCE
Refills: 0 | Status: COMPLETED | OUTPATIENT
Start: 2023-02-17 | End: 2023-02-17

## 2023-02-17 RX ADMIN — Medication 5 UNIT(S): at 18:06

## 2023-02-17 RX ADMIN — BUDESONIDE AND FORMOTEROL FUMARATE DIHYDRATE 2 PUFF(S): 160; 4.5 AEROSOL RESPIRATORY (INHALATION) at 08:54

## 2023-02-17 RX ADMIN — Medication 50 MILLILITER(S): at 18:19

## 2023-02-17 RX ADMIN — Medication 1 SPRAY(S): at 12:41

## 2023-02-17 RX ADMIN — Medication 1 APPLICATION(S): at 17:08

## 2023-02-17 RX ADMIN — Medication 25 UNIT(S): at 22:35

## 2023-02-17 RX ADMIN — ISOSORBIDE MONONITRATE 30 MILLIGRAM(S): 60 TABLET, EXTENDED RELEASE ORAL at 13:27

## 2023-02-17 RX ADMIN — Medication 325 MILLIGRAM(S): at 12:38

## 2023-02-17 RX ADMIN — LIDOCAINE 1 PATCH: 4 CREAM TOPICAL at 01:09

## 2023-02-17 RX ADMIN — WARFARIN SODIUM 5 MILLIGRAM(S): 2.5 TABLET ORAL at 04:04

## 2023-02-17 RX ADMIN — BUMETANIDE 2 MILLIGRAM(S): 0.25 INJECTION INTRAMUSCULAR; INTRAVENOUS at 07:54

## 2023-02-17 RX ADMIN — Medication 1 APPLICATION(S): at 23:07

## 2023-02-17 RX ADMIN — Medication 1 APPLICATION(S): at 12:41

## 2023-02-17 RX ADMIN — ERYTHROPOIETIN 10000 UNIT(S): 10000 INJECTION, SOLUTION INTRAVENOUS; SUBCUTANEOUS at 19:10

## 2023-02-17 RX ADMIN — AMIODARONE HYDROCHLORIDE 200 MILLIGRAM(S): 400 TABLET ORAL at 05:15

## 2023-02-17 RX ADMIN — SIMVASTATIN 10 MILLIGRAM(S): 20 TABLET, FILM COATED ORAL at 22:34

## 2023-02-17 RX ADMIN — Medication 1 SPRAY(S): at 21:49

## 2023-02-17 RX ADMIN — Medication 1 APPLICATION(S): at 03:52

## 2023-02-17 RX ADMIN — Medication 1 SPRAY(S): at 17:08

## 2023-02-17 RX ADMIN — Medication 5 UNIT(S): at 08:53

## 2023-02-17 RX ADMIN — BUDESONIDE AND FORMOTEROL FUMARATE DIHYDRATE 2 PUFF(S): 160; 4.5 AEROSOL RESPIRATORY (INHALATION) at 21:49

## 2023-02-17 RX ADMIN — Medication 3 MILLIGRAM(S): at 22:26

## 2023-02-17 RX ADMIN — Medication 5 MILLIGRAM(S): at 05:15

## 2023-02-17 RX ADMIN — Medication 1 SPRAY(S): at 01:32

## 2023-02-17 RX ADMIN — LIDOCAINE 1 PATCH: 4 CREAM TOPICAL at 13:28

## 2023-02-17 RX ADMIN — LIDOCAINE 1 PATCH: 4 CREAM TOPICAL at 18:59

## 2023-02-17 RX ADMIN — Medication 1 APPLICATION(S): at 05:37

## 2023-02-17 RX ADMIN — Medication 5 UNIT(S): at 12:39

## 2023-02-17 RX ADMIN — WARFARIN SODIUM 5 MILLIGRAM(S): 2.5 TABLET ORAL at 22:26

## 2023-02-17 RX ADMIN — IRON SUCROSE 110 MILLIGRAM(S): 20 INJECTION, SOLUTION INTRAVENOUS at 17:04

## 2023-02-17 RX ADMIN — Medication 1 SPRAY(S): at 08:53

## 2023-02-17 RX ADMIN — Medication 1 SPRAY(S): at 05:19

## 2023-02-17 RX ADMIN — Medication 50 MILLIGRAM(S): at 13:10

## 2023-02-17 RX ADMIN — BUMETANIDE 2 MILLIGRAM(S): 0.25 INJECTION INTRAMUSCULAR; INTRAVENOUS at 18:47

## 2023-02-17 NOTE — PROGRESS NOTE ADULT - PROBLEM SELECTOR PLAN 1
as per pulm , cont bronchodilators  BPAP at night  prednisone 5  taper 02  diuresis per cardio  outpatient sleep study/ sleep eval

## 2023-02-17 NOTE — PROGRESS NOTE ADULT - SUBJECTIVE AND OBJECTIVE BOX
PULMONARY PROGRESS NOTE    DAMARI GUERRERO  MRN-[id number]    Patient is a 72y old  Female who presents with a chief complaint of Epistaxis (16 Feb 2023 11:28)      HPI:  -seen this morning on NC again  no epistaxis  no abdominal pain  no resp complaints  -    ROS:   -    ACTIVE MEDICATION LIST:  MEDICATIONS  (STANDING):  aMIOdarone    Tablet 200 milliGRAM(s) Oral daily  budesonide 160 MICROgram(s)/formoterol 4.5 MICROgram(s) Inhaler 2 Puff(s) Inhalation two times a day  buMETAnide Injectable 2 milliGRAM(s) IV Push two times a day  dextrose 5%. 1000 milliLiter(s) (100 mL/Hr) IV Continuous <Continuous>  dextrose 5%. 1000 milliLiter(s) (50 mL/Hr) IV Continuous <Continuous>  dextrose 50% Injectable 25 Gram(s) IV Push once  dextrose 50% Injectable 12.5 Gram(s) IV Push once  dextrose 50% Injectable 25 Gram(s) IV Push once  ferrous    sulfate 325 milliGRAM(s) Oral daily  glucagon  Injectable 1 milliGRAM(s) IntraMuscular once  hydrALAZINE 50 milliGRAM(s) Oral three times a day  insulin detemir injectable (LEVEMIR) 25 Unit(s) SubCutaneous at bedtime  insulin lispro (ADMELOG) corrective regimen sliding scale   SubCutaneous three times a day before meals  insulin lispro (ADMELOG) corrective regimen sliding scale   SubCutaneous at bedtime  insulin lispro Injectable (ADMELOG) 5 Unit(s) SubCutaneous three times a day before meals  isosorbide   mononitrate ER Tablet (IMDUR) 30 milliGRAM(s) Oral daily  pantoprazole    Tablet 40 milliGRAM(s) Oral before breakfast  petrolatum white Ointment 1 Application(s) Topical every 6 hours  predniSONE   Tablet 5 milliGRAM(s) Oral daily  simvastatin 10 milliGRAM(s) Oral at bedtime  sodium chloride 0.65% Nasal 1 Spray(s) Both Nostrils every 4 hours    MEDICATIONS  (PRN):  acetaminophen     Tablet .. 650 milliGRAM(s) Oral every 6 hours PRN Temp greater or equal to 38C (100.4F), Mild Pain (1 - 3)  albuterol    90 MICROgram(s) HFA Inhaler 2 Puff(s) Inhalation every 6 hours PRN Bronchospasm  aluminum hydroxide/magnesium hydroxide/simethicone Suspension 30 milliLiter(s) Oral every 4 hours PRN Dyspepsia  dextrose Oral Gel 15 Gram(s) Oral once PRN Blood Glucose LESS THAN 70 milliGRAM(s)/deciliter  lidocaine   4% Patch 1 Patch Transdermal daily PRN pain  melatonin 3 milliGRAM(s) Oral at bedtime PRN Insomnia  oxymetazoline 0.05% Nasal Spray 2 Spray(s) Both Nostrils every 12 hours PRN Nose bleeds      EXAM:  Vital Signs Last 24 Hrs  T(C): 36.6 (17 Feb 2023 05:15), Max: 36.9 (16 Feb 2023 21:50)  T(F): 97.8 (17 Feb 2023 05:15), Max: 98.4 (16 Feb 2023 21:50)  HR: 57 (17 Feb 2023 05:15) (57 - 66)  BP: 113/43 (17 Feb 2023 05:15) (106/50 - 118/56)  BP(mean): --  RR: 20 (17 Feb 2023 05:15) (16 - 20)  SpO2: 97% (17 Feb 2023 05:15) (97% - 100%)    Parameters below as of 17 Feb 2023 05:15  Patient On (Oxygen Delivery Method): nasal cannula  O2 Flow (L/min): 3      GENERAL: The patient is awake and alert in no apparent distress.     LUNGS: Clear to auscultation without wheezing, rales or rhonchi; respirations unlabored                             8.0    5.78  )-----------( 343      ( 17 Feb 2023 06:00 )             28.5       02-17    140  |  95<L>  |  88<H>  ----------------------------<  75  5.1   |  35<H>  |  2.47<H>    Ca    9.8      17 Feb 2023 06:00  Phos  3.5     02-17  Mg     2.60     02-17       < from: Xray Chest 1 View- PORTABLE-Urgent (Xray Chest 1 View- PORTABLE-Urgent .) (02.14.23 @ 20:36) >    ACC: [id number] EXAM:  XR CHEST PORTABLE URGENT 1V   ORDERED BY: MARY BETH LESLIE     PROCEDURE DATE:  02/14/2023          INTERPRETATION:  CLINICAL INFORMATION: Dyspnea    TIME OF EXAMINATION: February 14 at 7:48 PM    EXAM: Portable chest    FINDINGS:  Bilateral perihilar opacities with left effusion consistent with edema.   Heart is enlarged with sternotomy wires and mitral valve prosthesis.    No focal consolidations.    No pneumothorax.        COMPARISON: February 4, 2023        IMPRESSION: CHF.    --- End of Report ---            DELIA PEDERSEN MD; Attending Radiologist  This document has been electronically signed. Feb 15 2023 10:52AM    < end of copied text >      PROBLEM LIST:  72y Female with HEALTH ISSUES - PROBLEM Dx:  Epistaxis    Chronic atrial fibrillation    COPD, severe    CHF (congestive heart failure)    History of mitral valve replacement with mechanical valve    Hypertension    Need for prophylactic measure    Type 2 diabetes mellitus         RECS:  back on coumadin  on bronchodilators  BPAP at night  prednisone 5  taper 02  diuresis per cardio  outpatient sleep study/ sleep eval      Please call with any questions over the weekend  Irma Eaton DO  [facility] Pulmonary/Sleep Medicine  [phone]

## 2023-02-17 NOTE — PROGRESS NOTE ADULT - SUBJECTIVE AND OBJECTIVE BOX
[facility] NEPHROLOGY- PROGRESS NOTE    72 year old Female with history of COPD, CHF presents with weakness. Nephrology consulted for elevated Scr.    REVIEW OF SYSTEMS:  Gen: no fevers  Cards: no chest pain  Resp: + dyspnea improving  GI: no nausea or vomiting or diarrhea  Vascular: + LE edema improving    No Known Allergies      Hospital Medications: Medications reviewed    VITALS:  T(F): 98.1 (23 @ 13:10), Max: 98.4 (23 @ 21:50)  HR: 67 (23 @ 13:10)  BP: 149/68 (23 @ 13:10)  RR: 19 (23 @ 13:10)  SpO2: 98% (23 @ 13:10)  Wt(kg): --    Weight (kg): 110 ( @ 00:34)     @ 07:01  -   @ 07:00  --------------------------------------------------------  IN: 200 mL / OUT: 450 mL / NET: -250 mL     @ 07:01  -   @ 13:37  --------------------------------------------------------  IN: 0 mL / OUT: 300 mL / NET: -300 mL        PHYSICAL EXAM:    Gen: NAD, calm  Cards: RRR, +S1/S2, no M/G/R  Resp: CTA B/L  GI: soft, NT/ND, NABS  Vascular: 1+ LE edema B/L    LABS:      140  |  95<L>  |  88<H>  ----------------------------<  75  5.1   |  35<H>  |  2.47<H>    Ca    9.8      2023 06:00  Phos  3.5       Mg     2.60           Creatinine Trend: 2.47 <--, 2.42 <--, 2.40 <--, 2.50 <--, 2.56 <--, 2.73 <--, 2.54 <--, 2.60 <--                        8.0    5.78  )-----------( 343      ( 2023 06:00 )             28.5     Urine Studies:  Urinalysis Basic - ( 2023 12:06 )    Color: Light Yellow / Appearance: Clear / S.011 / pH:   Gluc:  / Ketone: Negative  / Bili: Negative / Urobili: <2 mg/dL   Blood:  / Protein: Negative / Nitrite: Negative   Leuk Esterase: Negative / RBC: 0 /HPF / WBC 0 /HPF   Sq Epi:  / Non Sq Epi:  / Bacteria:       Creatinine, Random Urine: 42 mg/dL ( @ 12:06)      RADIOLOGY & ADDITIONAL STUDIES:

## 2023-02-17 NOTE — PROGRESS NOTE ADULT - SUBJECTIVE AND OBJECTIVE BOX
SUBJECTIVE / OVERNIGHT EVENTS:pt seen and examined, pt says she feels little better than yesterday but still feels sob  23    MEDICATIONS  (STANDING):  albumin human 25% IVPB 50 milliLiter(s) IV Intermittent every 6 hours  aMIOdarone    Tablet 200 milliGRAM(s) Oral daily  budesonide 160 MICROgram(s)/formoterol 4.5 MICROgram(s) Inhaler 2 Puff(s) Inhalation two times a day  buMETAnide Injectable 2 milliGRAM(s) IV Push two times a day  dextrose 5%. 1000 milliLiter(s) (50 mL/Hr) IV Continuous <Continuous>  dextrose 5%. 1000 milliLiter(s) (100 mL/Hr) IV Continuous <Continuous>  dextrose 50% Injectable 25 Gram(s) IV Push once  dextrose 50% Injectable 12.5 Gram(s) IV Push once  dextrose 50% Injectable 25 Gram(s) IV Push once  ferrous    sulfate 325 milliGRAM(s) Oral daily  glucagon  Injectable 1 milliGRAM(s) IntraMuscular once  hydrALAZINE 50 milliGRAM(s) Oral three times a day  insulin detemir injectable (LEVEMIR) 25 Unit(s) SubCutaneous at bedtime  insulin lispro (ADMELOG) corrective regimen sliding scale   SubCutaneous three times a day before meals  insulin lispro (ADMELOG) corrective regimen sliding scale   SubCutaneous at bedtime  insulin lispro Injectable (ADMELOG) 5 Unit(s) SubCutaneous three times a day before meals  isosorbide   mononitrate ER Tablet (IMDUR) 30 milliGRAM(s) Oral daily  pantoprazole    Tablet 40 milliGRAM(s) Oral before breakfast  petrolatum white Ointment 1 Application(s) Topical every 6 hours  predniSONE   Tablet 5 milliGRAM(s) Oral daily  simvastatin 10 milliGRAM(s) Oral at bedtime  sodium chloride 0.65% Nasal 1 Spray(s) Both Nostrils every 4 hours    MEDICATIONS  (PRN):  acetaminophen     Tablet .. 650 milliGRAM(s) Oral every 6 hours PRN Temp greater or equal to 38C (100.4F), Mild Pain (1 - 3)  albuterol    90 MICROgram(s) HFA Inhaler 2 Puff(s) Inhalation every 6 hours PRN Bronchospasm  aluminum hydroxide/magnesium hydroxide/simethicone Suspension 30 milliLiter(s) Oral every 4 hours PRN Dyspepsia  dextrose Oral Gel 15 Gram(s) Oral once PRN Blood Glucose LESS THAN 70 milliGRAM(s)/deciliter  lidocaine   4% Patch 1 Patch Transdermal daily PRN pain  melatonin 3 milliGRAM(s) Oral at bedtime PRN Insomnia  oxymetazoline 0.05% Nasal Spray 2 Spray(s) Both Nostrils every 12 hours PRN Nose bleeds    Vital Signs Last 24 Hrs  T(C): 36.7 (23 @ 22:30), Max: 37 (23 @ 18:30)  T(F): 98 (23 @ 22:30), Max: 98.6 (23 @ 18:30)  HR: 54 (23 @ 22:30) (54 - 69)  BP: 119/47 (23 @ 22:30) (113/43 - 149/68)  BP(mean): --  RR: 18 (23 @ 22:30) (18 - 20)  SpO2: 98% (23 @ 22:30) (97% - 100%)              Constitutional: No fever, fatigue  Skin: No rash.  Eyes: No recent vision problems or eye pain.  ENT: No congestion, ear pain, or sore throat.  Cardiovascular: No chest pain or palpation.  Respiratory: sob+  Gastrointestinal: No abdominal pain, nausea, vomiting, or diarrhea.  Genitourinary: No dysuria.  Musculoskeletal: No joint swelling.  Neurologic: No headache.    PHYSICAL EXAM:  GENERAL: NAD  EYES: EOMI, PERRLA  NECK: Supple, No JVD  CHEST/LUNG: dec breath sounds at bases  HEART:  S1 , S2 +  ABDOMEN: soft, bs+  EXTREMITIES:  edema+  NEUROLOGY:alert awake oriented    LABS:      140  |  95<L>  |  88<H>  ----------------------------<  75  5.1   |  35<H>  |  2.47<H>    Ca    9.8      2023 06:00  Phos  3.5       Mg     2.60           Creatinine Trend: 2.47 <--, 2.42 <--, 2.40 <--, 2.50 <--, 2.56 <--, 2.73 <--, 2.54 <--, 2.60 <--                        8.0    5.78  )-----------( 343      ( 2023 06:00 )             28.5     Urine Studies:  Urinalysis Basic - ( 2023 12:06 )    Color: Light Yellow / Appearance: Clear / S.011 / pH:   Gluc:  / Ketone: Negative  / Bili: Negative / Urobili: <2 mg/dL   Blood:  / Protein: Negative / Nitrite: Negative   Leuk Esterase: Negative / RBC: 0 /HPF / WBC 0 /HPF   Sq Epi:  / Non Sq Epi:  / Bacteria:       Creatinine, Random Urine: 42 mg/dL ( @ 12:06)            PT/INR - ( 2023 06:00 )   PT: 28.8 sec;   INR: 2.46 ratio              PTT - ( 2023 06:35 )  PTT:45.7 sec      Imaging Personally Reviewed:yes    Consultant(s) Notes Reviewed:  yes    Care Discussed with Consultants/Other Providers:yes

## 2023-02-18 LAB
ANION GAP SERPL CALC-SCNC: 10 MMOL/L — SIGNIFICANT CHANGE UP (ref 7–14)
BUN SERPL-MCNC: 90 MG/DL — HIGH (ref 7–23)
CALCIUM SERPL-MCNC: 9.8 MG/DL — SIGNIFICANT CHANGE UP (ref 8.4–10.5)
CHLORIDE SERPL-SCNC: 95 MMOL/L — LOW (ref 98–107)
CO2 SERPL-SCNC: 32 MMOL/L — HIGH (ref 22–31)
CREAT SERPL-MCNC: 2.4 MG/DL — HIGH (ref 0.5–1.3)
EGFR: 21 ML/MIN/1.73M2 — LOW
GLUCOSE BLDC GLUCOMTR-MCNC: 112 MG/DL — HIGH (ref 70–99)
GLUCOSE BLDC GLUCOMTR-MCNC: 131 MG/DL — HIGH (ref 70–99)
GLUCOSE BLDC GLUCOMTR-MCNC: 166 MG/DL — HIGH (ref 70–99)
GLUCOSE BLDC GLUCOMTR-MCNC: 95 MG/DL — SIGNIFICANT CHANGE UP (ref 70–99)
GLUCOSE SERPL-MCNC: 74 MG/DL — SIGNIFICANT CHANGE UP (ref 70–99)
INR BLD: 2.61 RATIO — HIGH (ref 0.88–1.16)
MAGNESIUM SERPL-MCNC: 2.5 MG/DL — SIGNIFICANT CHANGE UP (ref 1.6–2.6)
PHOSPHATE SERPL-MCNC: 3.3 MG/DL — SIGNIFICANT CHANGE UP (ref 2.5–4.5)
POTASSIUM SERPL-MCNC: 5.2 MMOL/L — SIGNIFICANT CHANGE UP (ref 3.5–5.3)
POTASSIUM SERPL-SCNC: 5.2 MMOL/L — SIGNIFICANT CHANGE UP (ref 3.5–5.3)
PROTHROM AB SERPL-ACNC: 30.6 SEC — HIGH (ref 10.5–13.4)
SODIUM SERPL-SCNC: 137 MMOL/L — SIGNIFICANT CHANGE UP (ref 135–145)

## 2023-02-18 RX ORDER — WARFARIN SODIUM 2.5 MG/1
5 TABLET ORAL ONCE
Refills: 0 | Status: COMPLETED | OUTPATIENT
Start: 2023-02-18 | End: 2023-02-18

## 2023-02-18 RX ADMIN — Medication 325 MILLIGRAM(S): at 13:00

## 2023-02-18 RX ADMIN — Medication 650 MILLIGRAM(S): at 19:25

## 2023-02-18 RX ADMIN — Medication 1 APPLICATION(S): at 18:00

## 2023-02-18 RX ADMIN — SIMVASTATIN 10 MILLIGRAM(S): 20 TABLET, FILM COATED ORAL at 22:15

## 2023-02-18 RX ADMIN — Medication 5 MILLIGRAM(S): at 06:25

## 2023-02-18 RX ADMIN — BUDESONIDE AND FORMOTEROL FUMARATE DIHYDRATE 2 PUFF(S): 160; 4.5 AEROSOL RESPIRATORY (INHALATION) at 09:01

## 2023-02-18 RX ADMIN — Medication 50 MILLIGRAM(S): at 13:03

## 2023-02-18 RX ADMIN — LIDOCAINE 1 PATCH: 4 CREAM TOPICAL at 01:29

## 2023-02-18 RX ADMIN — Medication 1 SPRAY(S): at 09:01

## 2023-02-18 RX ADMIN — WARFARIN SODIUM 5 MILLIGRAM(S): 2.5 TABLET ORAL at 22:15

## 2023-02-18 RX ADMIN — Medication 1 SPRAY(S): at 06:24

## 2023-02-18 RX ADMIN — Medication 1 SPRAY(S): at 13:02

## 2023-02-18 RX ADMIN — Medication 25 UNIT(S): at 22:15

## 2023-02-18 RX ADMIN — Medication 50 MILLILITER(S): at 20:37

## 2023-02-18 RX ADMIN — Medication 1 SPRAY(S): at 00:23

## 2023-02-18 RX ADMIN — BUMETANIDE 2 MILLIGRAM(S): 0.25 INJECTION INTRAMUSCULAR; INTRAVENOUS at 18:11

## 2023-02-18 RX ADMIN — Medication 5 UNIT(S): at 17:59

## 2023-02-18 RX ADMIN — Medication 5 UNIT(S): at 09:00

## 2023-02-18 RX ADMIN — Medication 50 MILLIGRAM(S): at 22:15

## 2023-02-18 RX ADMIN — Medication 1 SPRAY(S): at 17:59

## 2023-02-18 RX ADMIN — Medication 1 SPRAY(S): at 22:16

## 2023-02-18 RX ADMIN — BUDESONIDE AND FORMOTEROL FUMARATE DIHYDRATE 2 PUFF(S): 160; 4.5 AEROSOL RESPIRATORY (INHALATION) at 22:16

## 2023-02-18 RX ADMIN — Medication 5 UNIT(S): at 12:58

## 2023-02-18 RX ADMIN — Medication 1 APPLICATION(S): at 13:02

## 2023-02-18 RX ADMIN — Medication 50 MILLILITER(S): at 09:10

## 2023-02-18 RX ADMIN — Medication 50 MILLILITER(S): at 03:25

## 2023-02-18 RX ADMIN — Medication 1: at 12:58

## 2023-02-18 RX ADMIN — ISOSORBIDE MONONITRATE 30 MILLIGRAM(S): 60 TABLET, EXTENDED RELEASE ORAL at 13:01

## 2023-02-18 RX ADMIN — Medication 30 MILLILITER(S): at 19:25

## 2023-02-18 RX ADMIN — Medication 50 MILLILITER(S): at 14:18

## 2023-02-18 RX ADMIN — BUMETANIDE 2 MILLIGRAM(S): 0.25 INJECTION INTRAMUSCULAR; INTRAVENOUS at 06:25

## 2023-02-18 NOTE — PROGRESS NOTE ADULT - SUBJECTIVE AND OBJECTIVE BOX
SUBJECTIVE / OVERNIGHT EVENTS:pt seen and examined, pt says she feels little better than yesterday but still feels sob  23    MEDICATIONS  (STANDING):  albumin human 25% IVPB 50 milliLiter(s) IV Intermittent every 6 hours  aMIOdarone    Tablet 200 milliGRAM(s) Oral daily  budesonide 160 MICROgram(s)/formoterol 4.5 MICROgram(s) Inhaler 2 Puff(s) Inhalation two times a day  buMETAnide Injectable 2 milliGRAM(s) IV Push two times a day  dextrose 5%. 1000 milliLiter(s) (50 mL/Hr) IV Continuous <Continuous>  dextrose 5%. 1000 milliLiter(s) (100 mL/Hr) IV Continuous <Continuous>  dextrose 50% Injectable 25 Gram(s) IV Push once  dextrose 50% Injectable 12.5 Gram(s) IV Push once  dextrose 50% Injectable 25 Gram(s) IV Push once  ferrous    sulfate 325 milliGRAM(s) Oral daily  glucagon  Injectable 1 milliGRAM(s) IntraMuscular once  hydrALAZINE 50 milliGRAM(s) Oral three times a day  insulin detemir injectable (LEVEMIR) 25 Unit(s) SubCutaneous at bedtime  insulin lispro (ADMELOG) corrective regimen sliding scale   SubCutaneous three times a day before meals  insulin lispro (ADMELOG) corrective regimen sliding scale   SubCutaneous at bedtime  insulin lispro Injectable (ADMELOG) 5 Unit(s) SubCutaneous three times a day before meals  isosorbide   mononitrate ER Tablet (IMDUR) 30 milliGRAM(s) Oral daily  pantoprazole    Tablet 40 milliGRAM(s) Oral before breakfast  petrolatum white Ointment 1 Application(s) Topical every 6 hours  predniSONE   Tablet 5 milliGRAM(s) Oral daily  simvastatin 10 milliGRAM(s) Oral at bedtime  sodium chloride 0.65% Nasal 1 Spray(s) Both Nostrils every 4 hours    MEDICATIONS  (PRN):  acetaminophen     Tablet .. 650 milliGRAM(s) Oral every 6 hours PRN Temp greater or equal to 38C (100.4F), Mild Pain (1 - 3)  albuterol    90 MICROgram(s) HFA Inhaler 2 Puff(s) Inhalation every 6 hours PRN Bronchospasm  aluminum hydroxide/magnesium hydroxide/simethicone Suspension 30 milliLiter(s) Oral every 4 hours PRN Dyspepsia  dextrose Oral Gel 15 Gram(s) Oral once PRN Blood Glucose LESS THAN 70 milliGRAM(s)/deciliter  lidocaine   4% Patch 1 Patch Transdermal daily PRN pain  melatonin 3 milliGRAM(s) Oral at bedtime PRN Insomnia  oxymetazoline 0.05% Nasal Spray 2 Spray(s) Both Nostrils every 12 hours PRN Nose bleeds    Vital Signs Last 24 Hrs  T(C): 36.7 (23 @ 21:50), Max: 37.1 (23 @ 18:00)  T(F): 98.1 (23 @ 21:50), Max: 98.7 (23 @ 18:00)  HR: 97 (23 @ 21:50) (55 - 97)  BP: 137/65 (23 @ 21:50) (122/52 - 151/73)  BP(mean): --  RR: 18 (23 @ 21:50) (18 - 19)  SpO2: 97% (23 @ 21:50) (93% - 100%)              Constitutional: No fever, fatigue  Skin: No rash.  Eyes: No recent vision problems or eye pain.  ENT: No congestion, ear pain, or sore throat.  Cardiovascular: No chest pain or palpation.  Respiratory: sob+  Gastrointestinal: No abdominal pain, nausea, vomiting, or diarrhea.  Genitourinary: No dysuria.  Musculoskeletal: No joint swelling.  Neurologic: No headache.    PHYSICAL EXAM:  GENERAL: NAD  EYES: EOMI, PERRLA  NECK: Supple, No JVD  CHEST/LUNG: dec breath sounds at bases  HEART:  S1 , S2 +  ABDOMEN: soft, bs+  EXTREMITIES:  edema+  NEUROLOGY:alert awake oriented    LABS:      137  |  95<L>  |  90<H>  ----------------------------<  74  5.2   |  32<H>  |  2.40<H>    Ca    9.8      2023 06:18  Phos  3.3       Mg     2.50           Creatinine Trend: 2.40 <--, 2.47 <--, 2.42 <--, 2.40 <--, 2.50 <--, 2.56 <--, 2.73 <--, 2.54 <--                        8.0    5.78  )-----------( 343      ( 2023 06:00 )             28.5     Urine Studies:  Urinalysis Basic - ( 2023 12:06 )    Color: Light Yellow / Appearance: Clear / S.011 / pH:   Gluc:  / Ketone: Negative  / Bili: Negative / Urobili: <2 mg/dL   Blood:  / Protein: Negative / Nitrite: Negative   Leuk Esterase: Negative / RBC: 0 /HPF / WBC 0 /HPF   Sq Epi:  / Non Sq Epi:  / Bacteria:       Creatinine, Random Urine: 42 mg/dL ( @ 12:06)            PT/INR - ( 2023 06:18 )   PT: 30.6 sec;   INR: 2.61 ratio               Imaging Personally Reviewed:yes    Consultant(s) Notes Reviewed:  yes    Care Discussed with Consultants/Other Providers:yes

## 2023-02-18 NOTE — PROGRESS NOTE ADULT - SUBJECTIVE AND OBJECTIVE BOX
[facility] NEPHROLOGY- PROGRESS NOTE    72 year old Female with history of COPD, CHF presents with weakness. Nephrology consulted for elevated Scr.    REVIEW OF SYSTEMS:  Gen: no fevers  Cards: no chest pain  Resp: + dyspnea improving  GI: no nausea or vomiting or diarrhea  Vascular: + LE edema improving    No Known Allergies      Hospital Medications: Medications reviewed    VITALS:  T(F): 97.5 (23 @ 06:25), Max: 98.6 (23 @ 18:30)  HR: 55 (23 @ 06:25)  BP: 122/52 (23 @ 06:25)  RR: 19 (23 @ 06:25)  SpO2: 100% (23 @ 06:25)  Wt(kg): --     @ 07:01  -   @ 07:00  --------------------------------------------------------  IN: 200 mL / OUT: 1600 mL / NET: -1400 mL      PHYSICAL EXAM:    Gen: NAD, calm  Cards: RRR, +S1/S2, no M/G/R  Resp: CTA B/L  GI: soft, NT/ND, NABS  Vascular: 1+ LE edema B/L    LABS:      137  |  95<L>  |  90<H>  ----------------------------<  74  5.2   |  32<H>  |  2.40<H>    Ca    9.8      2023 06:18  Phos  3.3     18  Mg     2.50     18      Creatinine Trend: 2.40 <--, 2.47 <--, 2.42 <--, 2.40 <--, 2.50 <--, 2.56 <--, 2.73 <--, 2.54 <--                        8.0    5.78  )-----------( 343      ( 2023 06:00 )             28.5     Urine Studies:  Urinalysis Basic - ( 2023 12:06 )    Color: Light Yellow / Appearance: Clear / S.011 / pH:   Gluc:  / Ketone: Negative  / Bili: Negative / Urobili: <2 mg/dL   Blood:  / Protein: Negative / Nitrite: Negative   Leuk Esterase: Negative / RBC: 0 /HPF / WBC 0 /HPF   Sq Epi:  / Non Sq Epi:  / Bacteria:       Creatinine, Random Urine: 42 mg/dL ( @ 12:06)

## 2023-02-19 LAB
ANION GAP SERPL CALC-SCNC: 10 MMOL/L — SIGNIFICANT CHANGE UP (ref 7–14)
APTT BLD: 50.9 SEC — HIGH (ref 27–36.3)
BUN SERPL-MCNC: 91 MG/DL — HIGH (ref 7–23)
CALCIUM SERPL-MCNC: 10.2 MG/DL — SIGNIFICANT CHANGE UP (ref 8.4–10.5)
CHLORIDE SERPL-SCNC: 94 MMOL/L — LOW (ref 98–107)
CO2 SERPL-SCNC: 35 MMOL/L — HIGH (ref 22–31)
CREAT SERPL-MCNC: 2.55 MG/DL — HIGH (ref 0.5–1.3)
EGFR: 19 ML/MIN/1.73M2 — LOW
GLUCOSE BLDC GLUCOMTR-MCNC: 108 MG/DL — HIGH (ref 70–99)
GLUCOSE BLDC GLUCOMTR-MCNC: 124 MG/DL — HIGH (ref 70–99)
GLUCOSE BLDC GLUCOMTR-MCNC: 140 MG/DL — HIGH (ref 70–99)
GLUCOSE BLDC GLUCOMTR-MCNC: 142 MG/DL — HIGH (ref 70–99)
GLUCOSE SERPL-MCNC: 116 MG/DL — HIGH (ref 70–99)
HCT VFR BLD CALC: 28.2 % — LOW (ref 34.5–45)
HGB BLD-MCNC: 8.1 G/DL — LOW (ref 11.5–15.5)
INR BLD: 3 RATIO — HIGH (ref 0.88–1.16)
MAGNESIUM SERPL-MCNC: 2.6 MG/DL — SIGNIFICANT CHANGE UP (ref 1.6–2.6)
MCHC RBC-ENTMCNC: 28.7 GM/DL — LOW (ref 32–36)
MCHC RBC-ENTMCNC: 29.7 PG — SIGNIFICANT CHANGE UP (ref 27–34)
MCV RBC AUTO: 103.3 FL — HIGH (ref 80–100)
NRBC # BLD: 2 /100 WBCS — HIGH (ref 0–0)
NRBC # FLD: 0.14 K/UL — HIGH (ref 0–0)
PHOSPHATE SERPL-MCNC: 3.3 MG/DL — SIGNIFICANT CHANGE UP (ref 2.5–4.5)
PLATELET # BLD AUTO: 304 K/UL — SIGNIFICANT CHANGE UP (ref 150–400)
POTASSIUM SERPL-MCNC: 5 MMOL/L — SIGNIFICANT CHANGE UP (ref 3.5–5.3)
POTASSIUM SERPL-SCNC: 5 MMOL/L — SIGNIFICANT CHANGE UP (ref 3.5–5.3)
PROTHROM AB SERPL-ACNC: 35.2 SEC — HIGH (ref 10.5–13.4)
RBC # BLD: 2.73 M/UL — LOW (ref 3.8–5.2)
RBC # FLD: 14.3 % — SIGNIFICANT CHANGE UP (ref 10.3–14.5)
SODIUM SERPL-SCNC: 139 MMOL/L — SIGNIFICANT CHANGE UP (ref 135–145)
WBC # BLD: 7.28 K/UL — SIGNIFICANT CHANGE UP (ref 3.8–10.5)
WBC # FLD AUTO: 7.28 K/UL — SIGNIFICANT CHANGE UP (ref 3.8–10.5)

## 2023-02-19 RX ORDER — ONDANSETRON 8 MG/1
4 TABLET, FILM COATED ORAL ONCE
Refills: 0 | Status: COMPLETED | OUTPATIENT
Start: 2023-02-19 | End: 2023-02-19

## 2023-02-19 RX ORDER — WARFARIN SODIUM 2.5 MG/1
5 TABLET ORAL ONCE
Refills: 0 | Status: COMPLETED | OUTPATIENT
Start: 2023-02-19 | End: 2023-02-19

## 2023-02-19 RX ORDER — BUMETANIDE 0.25 MG/ML
2 INJECTION INTRAMUSCULAR; INTRAVENOUS EVERY 12 HOURS
Refills: 0 | Status: DISCONTINUED | OUTPATIENT
Start: 2023-02-20 | End: 2023-02-21

## 2023-02-19 RX ADMIN — LIDOCAINE 1 PATCH: 4 CREAM TOPICAL at 12:44

## 2023-02-19 RX ADMIN — ISOSORBIDE MONONITRATE 30 MILLIGRAM(S): 60 TABLET, EXTENDED RELEASE ORAL at 15:20

## 2023-02-19 RX ADMIN — Medication 1 APPLICATION(S): at 06:00

## 2023-02-19 RX ADMIN — BUMETANIDE 2 MILLIGRAM(S): 0.25 INJECTION INTRAMUSCULAR; INTRAVENOUS at 06:00

## 2023-02-19 RX ADMIN — Medication 50 MILLIGRAM(S): at 05:58

## 2023-02-19 RX ADMIN — Medication 5 UNIT(S): at 12:39

## 2023-02-19 RX ADMIN — Medication 50 MILLILITER(S): at 12:38

## 2023-02-19 RX ADMIN — Medication 50 MILLILITER(S): at 17:37

## 2023-02-19 RX ADMIN — Medication 1 SPRAY(S): at 17:34

## 2023-02-19 RX ADMIN — Medication 25 UNIT(S): at 21:40

## 2023-02-19 RX ADMIN — Medication 5 UNIT(S): at 17:35

## 2023-02-19 RX ADMIN — Medication 50 MILLIGRAM(S): at 15:20

## 2023-02-19 RX ADMIN — SIMVASTATIN 10 MILLIGRAM(S): 20 TABLET, FILM COATED ORAL at 21:40

## 2023-02-19 RX ADMIN — ONDANSETRON 4 MILLIGRAM(S): 8 TABLET, FILM COATED ORAL at 09:51

## 2023-02-19 RX ADMIN — Medication 1 APPLICATION(S): at 17:34

## 2023-02-19 RX ADMIN — Medication 5 MILLIGRAM(S): at 05:58

## 2023-02-19 RX ADMIN — Medication 325 MILLIGRAM(S): at 12:39

## 2023-02-19 RX ADMIN — LIDOCAINE 1 PATCH: 4 CREAM TOPICAL at 01:25

## 2023-02-19 RX ADMIN — Medication 1 SPRAY(S): at 12:37

## 2023-02-19 RX ADMIN — BUMETANIDE 2 MILLIGRAM(S): 0.25 INJECTION INTRAMUSCULAR; INTRAVENOUS at 15:39

## 2023-02-19 RX ADMIN — Medication 50 MILLIGRAM(S): at 21:40

## 2023-02-19 RX ADMIN — Medication 30 MILLILITER(S): at 01:24

## 2023-02-19 RX ADMIN — Medication 1 SPRAY(S): at 02:18

## 2023-02-19 RX ADMIN — Medication 50 MILLILITER(S): at 02:17

## 2023-02-19 RX ADMIN — Medication 5 UNIT(S): at 09:10

## 2023-02-19 RX ADMIN — Medication 1 APPLICATION(S): at 12:38

## 2023-02-19 RX ADMIN — Medication 1 SPRAY(S): at 21:40

## 2023-02-19 RX ADMIN — Medication 1 SPRAY(S): at 06:00

## 2023-02-19 RX ADMIN — AMIODARONE HYDROCHLORIDE 200 MILLIGRAM(S): 400 TABLET ORAL at 05:58

## 2023-02-19 RX ADMIN — LIDOCAINE 1 PATCH: 4 CREAM TOPICAL at 07:00

## 2023-02-19 RX ADMIN — WARFARIN SODIUM 5 MILLIGRAM(S): 2.5 TABLET ORAL at 21:39

## 2023-02-19 RX ADMIN — Medication 1 SPRAY(S): at 09:13

## 2023-02-19 RX ADMIN — Medication 1 APPLICATION(S): at 00:30

## 2023-02-19 RX ADMIN — Medication 1 APPLICATION(S): at 23:04

## 2023-02-19 RX ADMIN — BUDESONIDE AND FORMOTEROL FUMARATE DIHYDRATE 2 PUFF(S): 160; 4.5 AEROSOL RESPIRATORY (INHALATION) at 21:40

## 2023-02-19 RX ADMIN — BUDESONIDE AND FORMOTEROL FUMARATE DIHYDRATE 2 PUFF(S): 160; 4.5 AEROSOL RESPIRATORY (INHALATION) at 09:12

## 2023-02-19 NOTE — PROGRESS NOTE ADULT - SUBJECTIVE AND OBJECTIVE BOX
SUBJECTIVE / OVERNIGHT EVENTS:pt seen and examined, pt says she feels little better than yesterday but still feels sob  23    MEDICATIONS  (STANDING):  aMIOdarone    Tablet 200 milliGRAM(s) Oral daily  budesonide 160 MICROgram(s)/formoterol 4.5 MICROgram(s) Inhaler 2 Puff(s) Inhalation two times a day  dextrose 5%. 1000 milliLiter(s) (50 mL/Hr) IV Continuous <Continuous>  dextrose 5%. 1000 milliLiter(s) (100 mL/Hr) IV Continuous <Continuous>  dextrose 50% Injectable 25 Gram(s) IV Push once  dextrose 50% Injectable 12.5 Gram(s) IV Push once  dextrose 50% Injectable 25 Gram(s) IV Push once  ferrous    sulfate 325 milliGRAM(s) Oral daily  glucagon  Injectable 1 milliGRAM(s) IntraMuscular once  hydrALAZINE 50 milliGRAM(s) Oral three times a day  insulin detemir injectable (LEVEMIR) 25 Unit(s) SubCutaneous at bedtime  insulin lispro (ADMELOG) corrective regimen sliding scale   SubCutaneous three times a day before meals  insulin lispro (ADMELOG) corrective regimen sliding scale   SubCutaneous at bedtime  insulin lispro Injectable (ADMELOG) 5 Unit(s) SubCutaneous three times a day before meals  isosorbide   mononitrate ER Tablet (IMDUR) 30 milliGRAM(s) Oral daily  pantoprazole    Tablet 40 milliGRAM(s) Oral before breakfast  petrolatum white Ointment 1 Application(s) Topical every 6 hours  predniSONE   Tablet 5 milliGRAM(s) Oral daily  simvastatin 10 milliGRAM(s) Oral at bedtime  sodium chloride 0.65% Nasal 1 Spray(s) Both Nostrils every 4 hours  warfarin 5 milliGRAM(s) Oral once    MEDICATIONS  (PRN):  acetaminophen     Tablet .. 650 milliGRAM(s) Oral every 6 hours PRN Temp greater or equal to 38C (100.4F), Mild Pain (1 - 3)  albuterol    90 MICROgram(s) HFA Inhaler 2 Puff(s) Inhalation every 6 hours PRN Bronchospasm  aluminum hydroxide/magnesium hydroxide/simethicone Suspension 30 milliLiter(s) Oral every 4 hours PRN Dyspepsia  dextrose Oral Gel 15 Gram(s) Oral once PRN Blood Glucose LESS THAN 70 milliGRAM(s)/deciliter  lidocaine   4% Patch 1 Patch Transdermal daily PRN pain  melatonin 3 milliGRAM(s) Oral at bedtime PRN Insomnia  oxymetazoline 0.05% Nasal Spray 2 Spray(s) Both Nostrils every 12 hours PRN Nose bleeds    Vital Signs Last 24 Hrs  T(C): 36.4 (23 @ 14:08), Max: 36.7 (23 @ 21:50)  T(F): 97.6 (23 @ 14:08), Max: 98.1 (23 @ 21:50)  HR: 58 (23 @ 15:14) (58 - 117)  BP: 131/51 (23 @ 14:08) (131/51 - 139/61)  BP(mean): --  RR: 18 (23 @ 15:14) (18 - 19)  SpO2: 99% (23 @ 15:14) (97% - 99%)              Constitutional: No fever, fatigue  Skin: No rash.  Eyes: No recent vision problems or eye pain.  ENT: No congestion, ear pain, or sore throat.  Cardiovascular: No chest pain or palpation.  Respiratory: sob+  Gastrointestinal: No abdominal pain, nausea, vomiting, or diarrhea.  Genitourinary: No dysuria.  Musculoskeletal: No joint swelling.  Neurologic: No headache.    PHYSICAL EXAM:  GENERAL: NAD  EYES: EOMI, PERRLA  NECK: Supple, No JVD  CHEST/LUNG: dec breath sounds at bases  HEART:  S1 , S2 +  ABDOMEN: soft, bs+  EXTREMITIES:  edema+  NEUROLOGY:alert awake oriented    LABS:      139  |  94<L>  |  91<H>  ----------------------------<  116<H>  5.0   |  35<H>  |  2.55<H>    Ca    10.2      2023 07:30  Phos  3.3       Mg     2.60           Creatinine Trend: 2.55 <--, 2.40 <--, 2.47 <--, 2.42 <--, 2.40 <--, 2.50 <--, 2.56 <--, 2.73 <--                        8.1    7.28  )-----------( 304      ( 2023 07:30 )             28.2     Urine Studies:  Urinalysis Basic - ( 2023 12:06 )    Color: Light Yellow / Appearance: Clear / S.011 / pH:   Gluc:  / Ketone: Negative  / Bili: Negative / Urobili: <2 mg/dL   Blood:  / Protein: Negative / Nitrite: Negative   Leuk Esterase: Negative / RBC: 0 /HPF / WBC 0 /HPF   Sq Epi:  / Non Sq Epi:  / Bacteria:       Creatinine, Random Urine: 42 mg/dL ( @ 12:06)            PT/INR - ( 2023 07:30 )   PT: 35.2 sec;   INR: 3.00 ratio         PTT - ( 2023 07:30 )  PTT:50.9 sec    Imaging Personally Reviewed:yes    Consultant(s) Notes Reviewed:  yes    Care Discussed with Consultants/Other Providers:yes

## 2023-02-19 NOTE — PROGRESS NOTE ADULT - SUBJECTIVE AND OBJECTIVE BOX
[facility] NEPHROLOGY- PROGRESS NOTE    72 year old Female with history of COPD, CHF presents with weakness. Nephrology consulted for elevated Scr.    REVIEW OF SYSTEMS:  Gen: no fevers  Cards: no chest pain  Resp: + dyspnea improving  GI: no nausea or vomiting or diarrhea  Vascular: + LE edema improving    No Known Allergies      Hospital Medications: Medications reviewed    VITALS:  T(F): 98.1 (23 @ 05:40), Max: 98.7 (23 @ 18:00)  HR: 58 (23 @ 05:40)  BP: 139/61 (23 @ 05:40)  RR: 19 (23 @ 05:40)  SpO2: 99% (23 @ 05:40)  Wt(kg): --     @ 07:01  -   @ 07:00  --------------------------------------------------------  IN: 1000 mL / OUT: 850 mL / NET: 150 mL            PHYSICAL EXAM:    Gen: NAD, calm  Cards: RRR, +S1/S2, no M/G/R  Resp: CTA B/L  GI: soft, NT/ND, NABS  Vascular: 1+ LE edema B/L    LABS:      139  |  94<L>  |  91<H>  ----------------------------<  116<H>  5.0   |  35<H>  |  2.55<H>    Ca    10.2      2023 07:30  Phos  3.3       Mg     2.60           Creatinine Trend: 2.55 <--, 2.40 <--, 2.47 <--, 2.42 <--, 2.40 <--, 2.50 <--, 2.56 <--, 2.73 <--                        8.1    7.28  )-----------( 304      ( 2023 07:30 )             28.2     Urine Studies:  Urinalysis Basic - ( 2023 12:06 )    Color: Light Yellow / Appearance: Clear / S.011 / pH:   Gluc:  / Ketone: Negative  / Bili: Negative / Urobili: <2 mg/dL   Blood:  / Protein: Negative / Nitrite: Negative   Leuk Esterase: Negative / RBC: 0 /HPF / WBC 0 /HPF   Sq Epi:  / Non Sq Epi:  / Bacteria:         Creatinine, Random Urine: 42 mg/dL ( @ 12:06)

## 2023-02-20 LAB
ANION GAP SERPL CALC-SCNC: 11 MMOL/L — SIGNIFICANT CHANGE UP (ref 7–14)
APTT BLD: 50.8 SEC — HIGH (ref 27–36.3)
BUN SERPL-MCNC: 87 MG/DL — HIGH (ref 7–23)
CALCIUM SERPL-MCNC: 10 MG/DL — SIGNIFICANT CHANGE UP (ref 8.4–10.5)
CHLORIDE SERPL-SCNC: 95 MMOL/L — LOW (ref 98–107)
CO2 SERPL-SCNC: 34 MMOL/L — HIGH (ref 22–31)
CREAT SERPL-MCNC: 2.94 MG/DL — HIGH (ref 0.5–1.3)
EGFR: 16 ML/MIN/1.73M2 — LOW
GLUCOSE BLDC GLUCOMTR-MCNC: 118 MG/DL — HIGH (ref 70–99)
GLUCOSE BLDC GLUCOMTR-MCNC: 134 MG/DL — HIGH (ref 70–99)
GLUCOSE BLDC GLUCOMTR-MCNC: 160 MG/DL — HIGH (ref 70–99)
GLUCOSE BLDC GLUCOMTR-MCNC: 91 MG/DL — SIGNIFICANT CHANGE UP (ref 70–99)
GLUCOSE SERPL-MCNC: 98 MG/DL — SIGNIFICANT CHANGE UP (ref 70–99)
HCT VFR BLD CALC: 28 % — LOW (ref 34.5–45)
HGB BLD-MCNC: 7.8 G/DL — LOW (ref 11.5–15.5)
INR BLD: 3.02 RATIO — HIGH (ref 0.88–1.16)
MAGNESIUM SERPL-MCNC: 2.6 MG/DL — SIGNIFICANT CHANGE UP (ref 1.6–2.6)
MCHC RBC-ENTMCNC: 27.9 GM/DL — LOW (ref 32–36)
MCHC RBC-ENTMCNC: 30.4 PG — SIGNIFICANT CHANGE UP (ref 27–34)
MCV RBC AUTO: 108.9 FL — HIGH (ref 80–100)
NRBC # BLD: 2 /100 WBCS — HIGH (ref 0–0)
NRBC # FLD: 0.1 K/UL — HIGH (ref 0–0)
PHOSPHATE SERPL-MCNC: 4.5 MG/DL — SIGNIFICANT CHANGE UP (ref 2.5–4.5)
PLATELET # BLD AUTO: 294 K/UL — SIGNIFICANT CHANGE UP (ref 150–400)
POTASSIUM SERPL-MCNC: 4.8 MMOL/L — SIGNIFICANT CHANGE UP (ref 3.5–5.3)
POTASSIUM SERPL-SCNC: 4.8 MMOL/L — SIGNIFICANT CHANGE UP (ref 3.5–5.3)
PROTHROM AB SERPL-ACNC: 35.4 SEC — HIGH (ref 10.5–13.4)
RBC # BLD: 2.57 M/UL — LOW (ref 3.8–5.2)
RBC # FLD: 14.5 % — SIGNIFICANT CHANGE UP (ref 10.3–14.5)
SODIUM SERPL-SCNC: 140 MMOL/L — SIGNIFICANT CHANGE UP (ref 135–145)
WBC # BLD: 6.4 K/UL — SIGNIFICANT CHANGE UP (ref 3.8–10.5)
WBC # FLD AUTO: 6.4 K/UL — SIGNIFICANT CHANGE UP (ref 3.8–10.5)

## 2023-02-20 RX ORDER — WARFARIN SODIUM 2.5 MG/1
5 TABLET ORAL ONCE
Refills: 0 | Status: COMPLETED | OUTPATIENT
Start: 2023-02-20 | End: 2023-02-20

## 2023-02-20 RX ORDER — ALBUMIN HUMAN 25 %
50 VIAL (ML) INTRAVENOUS EVERY 6 HOURS
Refills: 0 | Status: DISCONTINUED | OUTPATIENT
Start: 2023-02-20 | End: 2023-02-21

## 2023-02-20 RX ORDER — ERYTHROPOIETIN 10000 [IU]/ML
10000 INJECTION, SOLUTION INTRAVENOUS; SUBCUTANEOUS ONCE
Refills: 0 | Status: COMPLETED | OUTPATIENT
Start: 2023-02-20 | End: 2023-02-20

## 2023-02-20 RX ORDER — ONDANSETRON 8 MG/1
4 TABLET, FILM COATED ORAL ONCE
Refills: 0 | Status: COMPLETED | OUTPATIENT
Start: 2023-02-20 | End: 2023-02-20

## 2023-02-20 RX ADMIN — Medication 1 APPLICATION(S): at 23:07

## 2023-02-20 RX ADMIN — Medication 1 APPLICATION(S): at 18:34

## 2023-02-20 RX ADMIN — Medication 50 MILLIGRAM(S): at 05:09

## 2023-02-20 RX ADMIN — ISOSORBIDE MONONITRATE 30 MILLIGRAM(S): 60 TABLET, EXTENDED RELEASE ORAL at 12:51

## 2023-02-20 RX ADMIN — Medication 50 MILLIGRAM(S): at 15:57

## 2023-02-20 RX ADMIN — Medication 1 SPRAY(S): at 12:51

## 2023-02-20 RX ADMIN — Medication 1 SPRAY(S): at 21:55

## 2023-02-20 RX ADMIN — Medication 50 MILLILITER(S): at 23:06

## 2023-02-20 RX ADMIN — Medication 5 UNIT(S): at 18:38

## 2023-02-20 RX ADMIN — SIMVASTATIN 10 MILLIGRAM(S): 20 TABLET, FILM COATED ORAL at 21:56

## 2023-02-20 RX ADMIN — Medication 5 UNIT(S): at 08:50

## 2023-02-20 RX ADMIN — Medication 1 SPRAY(S): at 18:34

## 2023-02-20 RX ADMIN — ONDANSETRON 4 MILLIGRAM(S): 8 TABLET, FILM COATED ORAL at 23:20

## 2023-02-20 RX ADMIN — Medication 5 MILLIGRAM(S): at 05:07

## 2023-02-20 RX ADMIN — ERYTHROPOIETIN 10000 UNIT(S): 10000 INJECTION, SOLUTION INTRAVENOUS; SUBCUTANEOUS at 18:37

## 2023-02-20 RX ADMIN — Medication 1 APPLICATION(S): at 12:52

## 2023-02-20 RX ADMIN — WARFARIN SODIUM 5 MILLIGRAM(S): 2.5 TABLET ORAL at 23:08

## 2023-02-20 RX ADMIN — BUDESONIDE AND FORMOTEROL FUMARATE DIHYDRATE 2 PUFF(S): 160; 4.5 AEROSOL RESPIRATORY (INHALATION) at 09:39

## 2023-02-20 RX ADMIN — Medication 1 APPLICATION(S): at 06:15

## 2023-02-20 RX ADMIN — Medication 1: at 12:49

## 2023-02-20 RX ADMIN — Medication 1 SPRAY(S): at 01:36

## 2023-02-20 RX ADMIN — BUDESONIDE AND FORMOTEROL FUMARATE DIHYDRATE 2 PUFF(S): 160; 4.5 AEROSOL RESPIRATORY (INHALATION) at 21:55

## 2023-02-20 RX ADMIN — Medication 5 UNIT(S): at 12:48

## 2023-02-20 RX ADMIN — BUMETANIDE 2 MILLIGRAM(S): 0.25 INJECTION INTRAMUSCULAR; INTRAVENOUS at 05:09

## 2023-02-20 RX ADMIN — Medication 1 SPRAY(S): at 05:09

## 2023-02-20 RX ADMIN — BUMETANIDE 2 MILLIGRAM(S): 0.25 INJECTION INTRAMUSCULAR; INTRAVENOUS at 18:35

## 2023-02-20 RX ADMIN — Medication 1 SPRAY(S): at 09:38

## 2023-02-20 RX ADMIN — Medication 325 MILLIGRAM(S): at 12:51

## 2023-02-20 RX ADMIN — AMIODARONE HYDROCHLORIDE 200 MILLIGRAM(S): 400 TABLET ORAL at 12:51

## 2023-02-20 RX ADMIN — Medication 50 MILLILITER(S): at 12:49

## 2023-02-20 RX ADMIN — Medication 50 MILLIGRAM(S): at 22:00

## 2023-02-20 RX ADMIN — Medication 25 UNIT(S): at 23:20

## 2023-02-20 RX ADMIN — Medication 50 MILLILITER(S): at 18:36

## 2023-02-20 NOTE — PROGRESS NOTE ADULT - SUBJECTIVE AND OBJECTIVE BOX
[facility] NEPHROLOGY- PROGRESS NOTE    72 year old Female with history of COPD, CHF presents with weakness. Nephrology consulted for elevated Scr.    REVIEW OF SYSTEMS:  Gen: no fevers  Cards: no chest pain  Resp: + dyspnea stable  GI: no nausea or vomiting or diarrhea  Vascular: + LE edema improving    No Known Allergies      Hospital Medications: Medications reviewed    VITALS:  T(F): 97.8 (23 @ 05:00), Max: 98.5 (23 @ 15:14)  HR: 68 (23 @ 07:55)  BP: 124/56 (23 @ 05:00)  RR: 18 (23 @ 05:00)  SpO2: 100% (23 @ 07:55)  Wt(kg): --     @ 07:01  -   @ 07:00  --------------------------------------------------------  IN: 0 mL / OUT: 200 mL / NET: -200 mL      PHYSICAL EXAM:    Gen: NAD, calm  Cards: RRR, +S1/S2, no M/G/R  Resp: CTA B/L  GI: soft, NT/ND, NABS  Vascular: 1+ LE edema B/L    LABS:      140  |  95<L>  |  87<H>  ----------------------------<  98  4.8   |  34<H>  |  2.94<H>    Ca    10.0      2023 05:51  Phos  4.5       Mg     2.60           Creatinine Trend: 2.94 <--, 2.55 <--, 2.40 <--, 2.47 <--, 2.42 <--, 2.40 <--, 2.50 <--                        7.8    6.40  )-----------( 294      ( 2023 05:51 )             28.0     Urine Studies:  Urinalysis Basic - ( 2023 12:06 )    Color: Light Yellow / Appearance: Clear / S.011 / pH:   Gluc:  / Ketone: Negative  / Bili: Negative / Urobili: <2 mg/dL   Blood:  / Protein: Negative / Nitrite: Negative   Leuk Esterase: Negative / RBC: 0 /HPF / WBC 0 /HPF   Sq Epi:  / Non Sq Epi:  / Bacteria:       Creatinine, Random Urine: 42 mg/dL (16 @ 12:06)

## 2023-02-20 NOTE — PROGRESS NOTE ADULT - SUBJECTIVE AND OBJECTIVE BOX
SUBJECTIVE / OVERNIGHT EVENTS:pt seen and examined, pt says she feels little better than yesterday but still feels sob  23  MEDICATIONS  (STANDING):  albumin human 25% IVPB 50 milliLiter(s) IV Intermittent every 6 hours  aMIOdarone    Tablet 200 milliGRAM(s) Oral daily  budesonide 160 MICROgram(s)/formoterol 4.5 MICROgram(s) Inhaler 2 Puff(s) Inhalation two times a day  buMETAnide 2 milliGRAM(s) Oral every 12 hours  dextrose 5%. 1000 milliLiter(s) (50 mL/Hr) IV Continuous <Continuous>  dextrose 5%. 1000 milliLiter(s) (100 mL/Hr) IV Continuous <Continuous>  dextrose 50% Injectable 25 Gram(s) IV Push once  dextrose 50% Injectable 12.5 Gram(s) IV Push once  dextrose 50% Injectable 25 Gram(s) IV Push once  ferrous    sulfate 325 milliGRAM(s) Oral daily  glucagon  Injectable 1 milliGRAM(s) IntraMuscular once  hydrALAZINE 50 milliGRAM(s) Oral three times a day  insulin detemir injectable (LEVEMIR) 25 Unit(s) SubCutaneous at bedtime  insulin lispro (ADMELOG) corrective regimen sliding scale   SubCutaneous three times a day before meals  insulin lispro (ADMELOG) corrective regimen sliding scale   SubCutaneous at bedtime  insulin lispro Injectable (ADMELOG) 5 Unit(s) SubCutaneous three times a day before meals  isosorbide   mononitrate ER Tablet (IMDUR) 30 milliGRAM(s) Oral daily  pantoprazole    Tablet 40 milliGRAM(s) Oral before breakfast  petrolatum white Ointment 1 Application(s) Topical every 6 hours  predniSONE   Tablet 5 milliGRAM(s) Oral daily  simvastatin 10 milliGRAM(s) Oral at bedtime  sodium chloride 0.65% Nasal 1 Spray(s) Both Nostrils every 4 hours    MEDICATIONS  (PRN):  acetaminophen     Tablet .. 650 milliGRAM(s) Oral every 6 hours PRN Temp greater or equal to 38C (100.4F), Mild Pain (1 - 3)  albuterol    90 MICROgram(s) HFA Inhaler 2 Puff(s) Inhalation every 6 hours PRN Bronchospasm  aluminum hydroxide/magnesium hydroxide/simethicone Suspension 30 milliLiter(s) Oral every 4 hours PRN Dyspepsia  dextrose Oral Gel 15 Gram(s) Oral once PRN Blood Glucose LESS THAN 70 milliGRAM(s)/deciliter  lidocaine   4% Patch 1 Patch Transdermal daily PRN pain  melatonin 3 milliGRAM(s) Oral at bedtime PRN Insomnia  oxymetazoline 0.05% Nasal Spray 2 Spray(s) Both Nostrils every 12 hours PRN Nose bleeds    Vital Signs Last 24 Hrs  T(C): 36.3 (23 @ 18:30), Max: 36.6 (23 @ 05:00)  T(F): 97.4 (23 @ 18:30), Max: 97.9 (23 @ 14:44)  HR: 59 (23 @ 22:23) (54 - 69)  BP: 128/51 (23 @ 18:30) (110/51 - 133/60)  BP(mean): 69 (23 @ 17:40) (69 - 69)  RR: 16 (23 @ 18:30) (16 - 18)  SpO2: 99% (23 @ 22:23) (97% - 100%)            Constitutional: No fever, fatigue  Skin: No rash.  Eyes: No recent vision problems or eye pain.  ENT: No congestion, ear pain, or sore throat.  Cardiovascular: No chest pain or palpation.  Respiratory: sob+  Gastrointestinal: No abdominal pain, nausea, vomiting, or diarrhea.  Genitourinary: No dysuria.  Musculoskeletal: No joint swelling.  Neurologic: No headache.    PHYSICAL EXAM:  GENERAL: NAD  EYES: EOMI, PERRLA  NECK: Supple, No JVD  CHEST/LUNG: dec breath sounds at bases  HEART:  S1 , S2 +  ABDOMEN: soft, bs+  EXTREMITIES:  edema+  NEUROLOGY:alert awake oriented    LABS:      140  |  95<L>  |  87<H>  ----------------------------<  98  4.8   |  34<H>  |  2.94<H>    Ca    10.0      2023 05:51  Phos  4.5       Mg     2.60           Creatinine Trend: 2.94 <--, 2.55 <--, 2.40 <--, 2.47 <--, 2.42 <--, 2.40 <--, 2.50 <--                        7.8    6.40  )-----------( 294      ( 2023 05:51 )             28.0     Urine Studies:  Urinalysis Basic - ( 2023 12:06 )    Color: Light Yellow / Appearance: Clear / S.011 / pH:   Gluc:  / Ketone: Negative  / Bili: Negative / Urobili: <2 mg/dL   Blood:  / Protein: Negative / Nitrite: Negative   Leuk Esterase: Negative / RBC: 0 /HPF / WBC 0 /HPF   Sq Epi:  / Non Sq Epi:  / Bacteria:       Creatinine, Random Urine: 42 mg/dL ( @ 12:06)            PT/INR - ( 2023 05:51 )   PT: 35.4 sec;   INR: 3.02 ratio         PTT - ( 2023 05:51 )  PTT:50.8 sec    PT/INR - ( 2023 07:30 )   PT: 35.2 sec;   INR: 3.00 ratio         PTT - ( 2023 07:30 )  PTT:50.9 sec    Imaging Personally Reviewed:yes    Consultant(s) Notes Reviewed:  yes    Care Discussed with Consultants/Other Providers:yes

## 2023-02-21 LAB
ANION GAP SERPL CALC-SCNC: 10 MMOL/L — SIGNIFICANT CHANGE UP (ref 7–14)
APPEARANCE UR: CLEAR — SIGNIFICANT CHANGE UP
APTT BLD: 50.8 SEC — HIGH (ref 27–36.3)
BASE EXCESS BLDV CALC-SCNC: 10.6 MMOL/L — HIGH (ref -2–3)
BILIRUB UR-MCNC: NEGATIVE — SIGNIFICANT CHANGE UP
BUN SERPL-MCNC: 96 MG/DL — HIGH (ref 7–23)
CALCIUM SERPL-MCNC: 10.3 MG/DL — SIGNIFICANT CHANGE UP (ref 8.4–10.5)
CHLORIDE SERPL-SCNC: 91 MMOL/L — LOW (ref 98–107)
CO2 BLDV-SCNC: 42.8 MMOL/L — HIGH (ref 22–26)
CO2 SERPL-SCNC: 37 MMOL/L — HIGH (ref 22–31)
COLOR SPEC: YELLOW — SIGNIFICANT CHANGE UP
CREAT ?TM UR-MCNC: 101 MG/DL — SIGNIFICANT CHANGE UP
CREAT SERPL-MCNC: 3.13 MG/DL — HIGH (ref 0.5–1.3)
DIFF PNL FLD: NEGATIVE — SIGNIFICANT CHANGE UP
EGFR: 15 ML/MIN/1.73M2 — LOW
GAS PNL BLDV: SIGNIFICANT CHANGE UP
GLUCOSE BLDC GLUCOMTR-MCNC: 109 MG/DL — HIGH (ref 70–99)
GLUCOSE BLDC GLUCOMTR-MCNC: 128 MG/DL — HIGH (ref 70–99)
GLUCOSE BLDC GLUCOMTR-MCNC: 131 MG/DL — HIGH (ref 70–99)
GLUCOSE BLDC GLUCOMTR-MCNC: 133 MG/DL — HIGH (ref 70–99)
GLUCOSE SERPL-MCNC: 110 MG/DL — HIGH (ref 70–99)
GLUCOSE UR QL: NEGATIVE — SIGNIFICANT CHANGE UP
HCO3 BLDV-SCNC: 40 MMOL/L — HIGH (ref 22–29)
HCT VFR BLD CALC: 28.7 % — LOW (ref 34.5–45)
HGB BLD-MCNC: 7.9 G/DL — LOW (ref 11.5–15.5)
INR BLD: 3.68 RATIO — HIGH (ref 0.88–1.16)
KETONES UR-MCNC: NEGATIVE — SIGNIFICANT CHANGE UP
LEUKOCYTE ESTERASE UR-ACNC: NEGATIVE — SIGNIFICANT CHANGE UP
MAGNESIUM SERPL-MCNC: 2.8 MG/DL — HIGH (ref 1.6–2.6)
MCHC RBC-ENTMCNC: 27.5 GM/DL — LOW (ref 32–36)
MCHC RBC-ENTMCNC: 29.7 PG — SIGNIFICANT CHANGE UP (ref 27–34)
MCV RBC AUTO: 107.9 FL — HIGH (ref 80–100)
NITRITE UR-MCNC: NEGATIVE — SIGNIFICANT CHANGE UP
NRBC # BLD: 1 /100 WBCS — HIGH (ref 0–0)
NRBC # FLD: 0.08 K/UL — HIGH (ref 0–0)
PCO2 BLDV: 80 MMHG — HIGH (ref 39–52)
PH BLDV: 7.31 — LOW (ref 7.32–7.43)
PH UR: 5.5 — SIGNIFICANT CHANGE UP (ref 5–8)
PHOSPHATE SERPL-MCNC: 4.2 MG/DL — SIGNIFICANT CHANGE UP (ref 2.5–4.5)
PLATELET # BLD AUTO: 267 K/UL — SIGNIFICANT CHANGE UP (ref 150–400)
PO2 BLDV: 44 MMHG — SIGNIFICANT CHANGE UP (ref 25–45)
POTASSIUM SERPL-MCNC: 4.7 MMOL/L — SIGNIFICANT CHANGE UP (ref 3.5–5.3)
POTASSIUM SERPL-SCNC: 4.7 MMOL/L — SIGNIFICANT CHANGE UP (ref 3.5–5.3)
PROT UR-MCNC: NEGATIVE — SIGNIFICANT CHANGE UP
PROTHROM AB SERPL-ACNC: 43.2 SEC — HIGH (ref 10.5–13.4)
RBC # BLD: 2.66 M/UL — LOW (ref 3.8–5.2)
RBC # FLD: 14.5 % — SIGNIFICANT CHANGE UP (ref 10.3–14.5)
RBC CASTS # UR COMP ASSIST: 1 /HPF — SIGNIFICANT CHANGE UP (ref 0–4)
SAO2 % BLDV: 72.1 % — SIGNIFICANT CHANGE UP (ref 67–88)
SODIUM SERPL-SCNC: 138 MMOL/L — SIGNIFICANT CHANGE UP (ref 135–145)
SP GR SPEC: 1.01 — SIGNIFICANT CHANGE UP (ref 1.01–1.05)
UROBILINOGEN FLD QL: SIGNIFICANT CHANGE UP
UUN UR-MCNC: 487 MG/DL — SIGNIFICANT CHANGE UP
WBC # BLD: 6.25 K/UL — SIGNIFICANT CHANGE UP (ref 3.8–10.5)
WBC # FLD AUTO: 6.25 K/UL — SIGNIFICANT CHANGE UP (ref 3.8–10.5)
WBC UR QL: 1 /HPF — SIGNIFICANT CHANGE UP (ref 0–5)

## 2023-02-21 RX ORDER — SIMETHICONE 80 MG/1
80 TABLET, CHEWABLE ORAL ONCE
Refills: 0 | Status: COMPLETED | OUTPATIENT
Start: 2023-02-21 | End: 2023-02-21

## 2023-02-21 RX ORDER — BUMETANIDE 0.25 MG/ML
1 INJECTION INTRAMUSCULAR; INTRAVENOUS ONCE
Refills: 0 | Status: COMPLETED | OUTPATIENT
Start: 2023-02-21 | End: 2023-02-21

## 2023-02-21 RX ORDER — WARFARIN SODIUM 2.5 MG/1
3 TABLET ORAL ONCE
Refills: 0 | Status: COMPLETED | OUTPATIENT
Start: 2023-02-21 | End: 2023-02-21

## 2023-02-21 RX ORDER — BUMETANIDE 0.25 MG/ML
1 INJECTION INTRAMUSCULAR; INTRAVENOUS
Qty: 20 | Refills: 0 | Status: DISCONTINUED | OUTPATIENT
Start: 2023-02-21 | End: 2023-02-25

## 2023-02-21 RX ORDER — ACETAMINOPHEN 500 MG
1000 TABLET ORAL ONCE
Refills: 0 | Status: COMPLETED | OUTPATIENT
Start: 2023-02-21 | End: 2023-02-21

## 2023-02-21 RX ORDER — HYDROMORPHONE HYDROCHLORIDE 2 MG/ML
1 INJECTION INTRAMUSCULAR; INTRAVENOUS; SUBCUTANEOUS ONCE
Refills: 0 | Status: DISCONTINUED | OUTPATIENT
Start: 2023-02-21 | End: 2023-02-21

## 2023-02-21 RX ADMIN — BUDESONIDE AND FORMOTEROL FUMARATE DIHYDRATE 2 PUFF(S): 160; 4.5 AEROSOL RESPIRATORY (INHALATION) at 22:21

## 2023-02-21 RX ADMIN — ISOSORBIDE MONONITRATE 30 MILLIGRAM(S): 60 TABLET, EXTENDED RELEASE ORAL at 12:50

## 2023-02-21 RX ADMIN — Medication 325 MILLIGRAM(S): at 12:50

## 2023-02-21 RX ADMIN — Medication 50 MILLIGRAM(S): at 22:20

## 2023-02-21 RX ADMIN — Medication 5 MILLIGRAM(S): at 05:57

## 2023-02-21 RX ADMIN — BUDESONIDE AND FORMOTEROL FUMARATE DIHYDRATE 2 PUFF(S): 160; 4.5 AEROSOL RESPIRATORY (INHALATION) at 09:17

## 2023-02-21 RX ADMIN — Medication 1 SPRAY(S): at 22:20

## 2023-02-21 RX ADMIN — Medication 50 MILLIGRAM(S): at 05:56

## 2023-02-21 RX ADMIN — SIMVASTATIN 10 MILLIGRAM(S): 20 TABLET, FILM COATED ORAL at 22:20

## 2023-02-21 RX ADMIN — Medication 1 APPLICATION(S): at 05:53

## 2023-02-21 RX ADMIN — Medication 1 SPRAY(S): at 18:12

## 2023-02-21 RX ADMIN — BUMETANIDE 5 MG/HR: 0.25 INJECTION INTRAMUSCULAR; INTRAVENOUS at 18:43

## 2023-02-21 RX ADMIN — HYDROMORPHONE HYDROCHLORIDE 1 MILLIGRAM(S): 2 INJECTION INTRAMUSCULAR; INTRAVENOUS; SUBCUTANEOUS at 19:08

## 2023-02-21 RX ADMIN — Medication 50 MILLILITER(S): at 05:59

## 2023-02-21 RX ADMIN — BUMETANIDE 1 MILLIGRAM(S): 0.25 INJECTION INTRAMUSCULAR; INTRAVENOUS at 17:56

## 2023-02-21 RX ADMIN — Medication 30 MILLILITER(S): at 06:07

## 2023-02-21 RX ADMIN — Medication 25 UNIT(S): at 22:21

## 2023-02-21 RX ADMIN — Medication 1 SPRAY(S): at 12:50

## 2023-02-21 RX ADMIN — Medication 1000 MILLIGRAM(S): at 15:50

## 2023-02-21 RX ADMIN — Medication 650 MILLIGRAM(S): at 06:38

## 2023-02-21 RX ADMIN — Medication 1 SPRAY(S): at 05:53

## 2023-02-21 RX ADMIN — WARFARIN SODIUM 3 MILLIGRAM(S): 2.5 TABLET ORAL at 22:20

## 2023-02-21 RX ADMIN — BUMETANIDE 2 MILLIGRAM(S): 0.25 INJECTION INTRAMUSCULAR; INTRAVENOUS at 05:57

## 2023-02-21 RX ADMIN — Medication 1 APPLICATION(S): at 12:51

## 2023-02-21 RX ADMIN — Medication 650 MILLIGRAM(S): at 06:08

## 2023-02-21 RX ADMIN — Medication 1 APPLICATION(S): at 18:12

## 2023-02-21 RX ADMIN — HYDROMORPHONE HYDROCHLORIDE 1 MILLIGRAM(S): 2 INJECTION INTRAMUSCULAR; INTRAVENOUS; SUBCUTANEOUS at 19:38

## 2023-02-21 RX ADMIN — Medication 5 UNIT(S): at 12:51

## 2023-02-21 RX ADMIN — SIMETHICONE 80 MILLIGRAM(S): 80 TABLET, CHEWABLE ORAL at 09:17

## 2023-02-21 RX ADMIN — Medication 400 MILLIGRAM(S): at 15:20

## 2023-02-21 RX ADMIN — Medication 1 SPRAY(S): at 09:18

## 2023-02-21 RX ADMIN — Medication 50 MILLIGRAM(S): at 14:30

## 2023-02-21 RX ADMIN — Medication 1 SPRAY(S): at 01:00

## 2023-02-21 NOTE — PROGRESS NOTE ADULT - SUBJECTIVE AND OBJECTIVE BOX
SUBJECTIVE / OVERNIGHT EVENTS:pt seen and examined, pt says she feels little better than yesterday but still feels sob  23    MEDICATIONS  (STANDING):  aMIOdarone    Tablet 200 milliGRAM(s) Oral daily  budesonide 160 MICROgram(s)/formoterol 4.5 MICROgram(s) Inhaler 2 Puff(s) Inhalation two times a day  buMETAnide Infusion 1 mG/Hr (5 mL/Hr) IV Continuous <Continuous>  dextrose 5%. 1000 milliLiter(s) (100 mL/Hr) IV Continuous <Continuous>  dextrose 5%. 1000 milliLiter(s) (50 mL/Hr) IV Continuous <Continuous>  dextrose 50% Injectable 25 Gram(s) IV Push once  dextrose 50% Injectable 12.5 Gram(s) IV Push once  dextrose 50% Injectable 25 Gram(s) IV Push once  ferrous    sulfate 325 milliGRAM(s) Oral daily  glucagon  Injectable 1 milliGRAM(s) IntraMuscular once  hydrALAZINE 50 milliGRAM(s) Oral three times a day  insulin detemir injectable (LEVEMIR) 25 Unit(s) SubCutaneous at bedtime  insulin lispro (ADMELOG) corrective regimen sliding scale   SubCutaneous three times a day before meals  insulin lispro (ADMELOG) corrective regimen sliding scale   SubCutaneous at bedtime  insulin lispro Injectable (ADMELOG) 5 Unit(s) SubCutaneous three times a day before meals  isosorbide   mononitrate ER Tablet (IMDUR) 30 milliGRAM(s) Oral daily  pantoprazole    Tablet 40 milliGRAM(s) Oral before breakfast  petrolatum white Ointment 1 Application(s) Topical every 6 hours  predniSONE   Tablet 5 milliGRAM(s) Oral daily  simvastatin 10 milliGRAM(s) Oral at bedtime  sodium chloride 0.65% Nasal 1 Spray(s) Both Nostrils every 4 hours    MEDICATIONS  (PRN):  acetaminophen     Tablet .. 650 milliGRAM(s) Oral every 6 hours PRN Temp greater or equal to 38C (100.4F), Mild Pain (1 - 3)  albuterol    90 MICROgram(s) HFA Inhaler 2 Puff(s) Inhalation every 6 hours PRN Bronchospasm  aluminum hydroxide/magnesium hydroxide/simethicone Suspension 30 milliLiter(s) Oral every 4 hours PRN Dyspepsia  dextrose Oral Gel 15 Gram(s) Oral once PRN Blood Glucose LESS THAN 70 milliGRAM(s)/deciliter  lidocaine   4% Patch 1 Patch Transdermal daily PRN pain  melatonin 3 milliGRAM(s) Oral at bedtime PRN Insomnia    Vital Signs Last 24 Hrs  T(C): 36.8 (23 @ 21:19), Max: 36.8 (23 @ 21:19)  T(F): 98.2 (23 @ 21:19), Max: 98.2 (23 @ 21:19)  HR: 79 (23 @ 21:19) (52 - 79)  BP: 167/63 (23 @ 21:19) (115/51 - 167/63)  BP(mean): --  RR: 18 (23 @ 21:19) (18 - 19)  SpO2: 98% (23 @ 21:19) (93% - 98%)            Constitutional: No fever, fatigue  Skin: No rash.  Eyes: No recent vision problems or eye pain.  ENT: No congestion, ear pain, or sore throat.  Cardiovascular: No chest pain or palpation.  Respiratory: sob+  Gastrointestinal: No abdominal pain, nausea, vomiting, or diarrhea.  Genitourinary: No dysuria.  Musculoskeletal: No joint swelling.  Neurologic: No headache.    PHYSICAL EXAM:  GENERAL: NAD  EYES: EOMI, PERRLA  NECK: Supple, No JVD  CHEST/LUNG: dec breath sounds at bases  HEART:  S1 , S2 +  ABDOMEN: soft, bs+  EXTREMITIES:  edema+  NEUROLOGY:alert awake oriented    LABS:      138  |  91<L>  |  96<H>  ----------------------------<  110<H>  4.7   |  37<H>  |  3.13<H>    Ca    10.3      2023 07:39  Phos  4.2       Mg     2.80           Creatinine Trend: 3.13 <--, 2.94 <--, 2.55 <--, 2.40 <--, 2.47 <--, 2.42 <--, 2.40 <--                        7.9    6.25  )-----------( 267      ( 2023 07:39 )             28.7     Urine Studies:  Urinalysis Basic - ( 2023 18:14 )    Color: Yellow / Appearance: Clear / S.014 / pH:   Gluc:  / Ketone: Negative  / Bili: Negative / Urobili: <2 mg/dL   Blood:  / Protein: Negative / Nitrite: Negative   Leuk Esterase: Negative / RBC: 1 /HPF / WBC 1 /HPF   Sq Epi:  / Non Sq Epi:  / Bacteria:       Creatinine, Random Urine: 101 mg/dL ( @ 18:14)  Creatinine, Random Urine: 42 mg/dL ( @ 12:06)            PT/INR - ( 2023 07:39 )   PT: 43.2 sec;   INR: 3.68 ratio         PTT - ( 2023 07:39 )  PTT:50.8 sec      Imaging Personally Reviewed:yes    Consultant(s) Notes Reviewed:  yes    Care Discussed with Consultants/Other Providers:yes

## 2023-02-21 NOTE — PROGRESS NOTE ADULT - SUBJECTIVE AND OBJECTIVE BOX
[facility] NEPHROLOGY- PROGRESS NOTE    72 year old Female with history of COPD, CHF presents with weakness. Nephrology consulted for elevated Scr.    REVIEW OF SYSTEMS:  Gen: no fevers  Cards: no chest pain  Resp: + dyspnea  GI: no nausea or vomiting or diarrhea, + abdominal pain  Vascular: + LE edema    No Known Allergies      Hospital Medications: Medications reviewed      VITALS:  T(F): 98 (23 @ 13:36), Max: 98.1 (23 @ 05:50)  HR: 64 (23 @ 13:36)  BP: 148/74 (23 @ 13:36)  RR: 18 (23 @ 13:36)  SpO2: 98% (23 @ 07:36)  Wt(kg): --     @ 07:01  -   @ 07:00  --------------------------------------------------------  IN: 390 mL / OUT: 151 mL / NET: 239 mL      Height (cm): 152.6 ( @ 22:00)  Weight (kg): 114.7 ( @ 22:00)  BMI (kg/m2): 49.3 ( @ 22:00)  BSA (m2): 2.06 ( @ 22:00)      PHYSICAL EXAM:    Gen: NAD, calm  Cards: RRR, +S1/S2, no M/G/R  Resp: bibasilar rales  GI: soft, + TTP in epigastric area, ND, NABS  Vascular: 1+ LE edema B/L      LABS:      138  |  91<L>  |  96<H>  ----------------------------<  110<H>  4.7   |  37<H>  |  3.13<H>    Ca    10.3      2023 07:39  Phos  4.2       Mg     2.80           Creatinine Trend: 3.13 <--, 2.94 <--, 2.55 <--, 2.40 <--, 2.47 <--, 2.42 <--, 2.40 <--                        7.9    6.25  )-----------( 267      ( 2023 07:39 )             28.7     Urine Studies:  Urinalysis Basic - ( 2023 12:06 )    Color: Light Yellow / Appearance: Clear / S.011 / pH:   Gluc:  / Ketone: Negative  / Bili: Negative / Urobili: <2 mg/dL   Blood:  / Protein: Negative / Nitrite: Negative   Leuk Esterase: Negative / RBC: 0 /HPF / WBC 0 /HPF   Sq Epi:  / Non Sq Epi:  / Bacteria:       Creatinine, Random Urine: 42 mg/dL ( @ 12:06)

## 2023-02-21 NOTE — PROCEDURE NOTE - ADDITIONAL PROCEDURE DETAILS
Patient requiring PIV access for medical management. Site prepped using aseptic technique. Under ultrasound guidance a vein in the R-forearm was identified and cannulated using a 20G Arrow Extended Dwell Angiocath. Flashback seen on needle insertion. Blood return present upon catheter deployment and upon aspiration using saline syringe plunger. Line flushes easily. Patient tolerated procedure well. No immediate complications. Nursing staff notified. Dressing to be changed Qweekly.

## 2023-02-22 LAB
ALBUMIN SERPL ELPH-MCNC: 4.5 G/DL — SIGNIFICANT CHANGE UP (ref 3.3–5)
ALP SERPL-CCNC: 84 U/L — SIGNIFICANT CHANGE UP (ref 40–120)
ALT FLD-CCNC: 11 U/L — SIGNIFICANT CHANGE UP (ref 4–33)
ANION GAP SERPL CALC-SCNC: 12 MMOL/L — SIGNIFICANT CHANGE UP (ref 7–14)
AST SERPL-CCNC: 17 U/L — SIGNIFICANT CHANGE UP (ref 4–32)
BILIRUB SERPL-MCNC: 0.4 MG/DL — SIGNIFICANT CHANGE UP (ref 0.2–1.2)
BUN SERPL-MCNC: 100 MG/DL — HIGH (ref 7–23)
CALCIUM SERPL-MCNC: 10 MG/DL — SIGNIFICANT CHANGE UP (ref 8.4–10.5)
CHLORIDE SERPL-SCNC: 94 MMOL/L — LOW (ref 98–107)
CO2 SERPL-SCNC: 34 MMOL/L — HIGH (ref 22–31)
CREAT SERPL-MCNC: 3.19 MG/DL — HIGH (ref 0.5–1.3)
EGFR: 15 ML/MIN/1.73M2 — LOW
GLUCOSE BLDC GLUCOMTR-MCNC: 100 MG/DL — HIGH (ref 70–99)
GLUCOSE BLDC GLUCOMTR-MCNC: 120 MG/DL — HIGH (ref 70–99)
GLUCOSE BLDC GLUCOMTR-MCNC: 129 MG/DL — HIGH (ref 70–99)
GLUCOSE BLDC GLUCOMTR-MCNC: 93 MG/DL — SIGNIFICANT CHANGE UP (ref 70–99)
GLUCOSE BLDC GLUCOMTR-MCNC: 97 MG/DL — SIGNIFICANT CHANGE UP (ref 70–99)
GLUCOSE SERPL-MCNC: 81 MG/DL — SIGNIFICANT CHANGE UP (ref 70–99)
HCT VFR BLD CALC: 27.4 % — LOW (ref 34.5–45)
HGB BLD-MCNC: 7.7 G/DL — LOW (ref 11.5–15.5)
INR BLD: 4.37 RATIO — HIGH (ref 0.88–1.16)
MAGNESIUM SERPL-MCNC: 2.8 MG/DL — HIGH (ref 1.6–2.6)
MCHC RBC-ENTMCNC: 28.1 GM/DL — LOW (ref 32–36)
MCHC RBC-ENTMCNC: 29.7 PG — SIGNIFICANT CHANGE UP (ref 27–34)
MCV RBC AUTO: 105.8 FL — HIGH (ref 80–100)
NRBC # BLD: 0 /100 WBCS — SIGNIFICANT CHANGE UP (ref 0–0)
NRBC # FLD: 0.05 K/UL — HIGH (ref 0–0)
PHOSPHATE SERPL-MCNC: 4.7 MG/DL — HIGH (ref 2.5–4.5)
PLATELET # BLD AUTO: 261 K/UL — SIGNIFICANT CHANGE UP (ref 150–400)
POTASSIUM SERPL-MCNC: 4.8 MMOL/L — SIGNIFICANT CHANGE UP (ref 3.5–5.3)
POTASSIUM SERPL-SCNC: 4.8 MMOL/L — SIGNIFICANT CHANGE UP (ref 3.5–5.3)
PROT SERPL-MCNC: 7.4 G/DL — SIGNIFICANT CHANGE UP (ref 6–8.3)
PROTHROM AB SERPL-ACNC: 51.5 SEC — HIGH (ref 10.5–13.4)
RBC # BLD: 2.59 M/UL — LOW (ref 3.8–5.2)
RBC # FLD: 14.5 % — SIGNIFICANT CHANGE UP (ref 10.3–14.5)
SODIUM SERPL-SCNC: 140 MMOL/L — SIGNIFICANT CHANGE UP (ref 135–145)
WBC # BLD: 6.54 K/UL — SIGNIFICANT CHANGE UP (ref 3.8–10.5)
WBC # FLD AUTO: 6.54 K/UL — SIGNIFICANT CHANGE UP (ref 3.8–10.5)

## 2023-02-22 PROCEDURE — 99232 SBSQ HOSP IP/OBS MODERATE 35: CPT

## 2023-02-22 PROCEDURE — 74176 CT ABD & PELVIS W/O CONTRAST: CPT | Mod: 26

## 2023-02-22 PROCEDURE — 71250 CT THORAX DX C-: CPT | Mod: 26

## 2023-02-22 RX ADMIN — ISOSORBIDE MONONITRATE 30 MILLIGRAM(S): 60 TABLET, EXTENDED RELEASE ORAL at 12:18

## 2023-02-22 RX ADMIN — Medication 1 APPLICATION(S): at 18:57

## 2023-02-22 RX ADMIN — Medication 50 MILLIGRAM(S): at 14:35

## 2023-02-22 RX ADMIN — Medication 1 SPRAY(S): at 01:10

## 2023-02-22 RX ADMIN — Medication 1 SPRAY(S): at 12:19

## 2023-02-22 RX ADMIN — Medication 1 SPRAY(S): at 09:49

## 2023-02-22 RX ADMIN — Medication 325 MILLIGRAM(S): at 12:18

## 2023-02-22 RX ADMIN — Medication 1 APPLICATION(S): at 12:19

## 2023-02-22 RX ADMIN — Medication 50 MILLIGRAM(S): at 06:16

## 2023-02-22 RX ADMIN — PANTOPRAZOLE SODIUM 40 MILLIGRAM(S): 20 TABLET, DELAYED RELEASE ORAL at 12:35

## 2023-02-22 RX ADMIN — BUMETANIDE 5 MG/HR: 0.25 INJECTION INTRAMUSCULAR; INTRAVENOUS at 22:54

## 2023-02-22 RX ADMIN — SIMVASTATIN 10 MILLIGRAM(S): 20 TABLET, FILM COATED ORAL at 22:54

## 2023-02-22 RX ADMIN — BUDESONIDE AND FORMOTEROL FUMARATE DIHYDRATE 2 PUFF(S): 160; 4.5 AEROSOL RESPIRATORY (INHALATION) at 22:54

## 2023-02-22 RX ADMIN — Medication 1 APPLICATION(S): at 01:13

## 2023-02-22 RX ADMIN — Medication 50 MILLIGRAM(S): at 22:54

## 2023-02-22 RX ADMIN — Medication 1 SPRAY(S): at 18:56

## 2023-02-22 RX ADMIN — Medication 1 SPRAY(S): at 06:16

## 2023-02-22 RX ADMIN — BUMETANIDE 5 MG/HR: 0.25 INJECTION INTRAMUSCULAR; INTRAVENOUS at 12:11

## 2023-02-22 RX ADMIN — Medication 1 APPLICATION(S): at 06:16

## 2023-02-22 RX ADMIN — BUDESONIDE AND FORMOTEROL FUMARATE DIHYDRATE 2 PUFF(S): 160; 4.5 AEROSOL RESPIRATORY (INHALATION) at 09:50

## 2023-02-22 RX ADMIN — Medication 5 MILLIGRAM(S): at 06:16

## 2023-02-22 NOTE — PROGRESS NOTE ADULT - SUBJECTIVE AND OBJECTIVE BOX
PULMONARY PROGRESS NOTE    DAMARI GUERRERO  MRN-[id number]    Patient is a 72y old  Female who presents with a chief complaint of Epistaxis (21 Feb 2023 14:01)      HPI:  seen on 3L  prior to her ct chest  complaining of abdominal pain    -    ROS:   -    ACTIVE MEDICATION LIST:  MEDICATIONS  (STANDING):  aMIOdarone    Tablet 200 milliGRAM(s) Oral daily  budesonide 160 MICROgram(s)/formoterol 4.5 MICROgram(s) Inhaler 2 Puff(s) Inhalation two times a day  buMETAnide Infusion 1 mG/Hr (5 mL/Hr) IV Continuous <Continuous>  dextrose 5%. 1000 milliLiter(s) (100 mL/Hr) IV Continuous <Continuous>  dextrose 5%. 1000 milliLiter(s) (50 mL/Hr) IV Continuous <Continuous>  dextrose 50% Injectable 25 Gram(s) IV Push once  dextrose 50% Injectable 12.5 Gram(s) IV Push once  dextrose 50% Injectable 25 Gram(s) IV Push once  ferrous    sulfate 325 milliGRAM(s) Oral daily  glucagon  Injectable 1 milliGRAM(s) IntraMuscular once  hydrALAZINE 50 milliGRAM(s) Oral three times a day  insulin detemir injectable (LEVEMIR) 25 Unit(s) SubCutaneous at bedtime  insulin lispro (ADMELOG) corrective regimen sliding scale   SubCutaneous three times a day before meals  insulin lispro (ADMELOG) corrective regimen sliding scale   SubCutaneous at bedtime  insulin lispro Injectable (ADMELOG) 5 Unit(s) SubCutaneous three times a day before meals  isosorbide   mononitrate ER Tablet (IMDUR) 30 milliGRAM(s) Oral daily  pantoprazole    Tablet 40 milliGRAM(s) Oral before breakfast  petrolatum white Ointment 1 Application(s) Topical every 6 hours  predniSONE   Tablet 5 milliGRAM(s) Oral daily  simvastatin 10 milliGRAM(s) Oral at bedtime  sodium chloride 0.65% Nasal 1 Spray(s) Both Nostrils every 4 hours    MEDICATIONS  (PRN):  acetaminophen     Tablet .. 650 milliGRAM(s) Oral every 6 hours PRN Temp greater or equal to 38C (100.4F), Mild Pain (1 - 3)  albuterol    90 MICROgram(s) HFA Inhaler 2 Puff(s) Inhalation every 6 hours PRN Bronchospasm  aluminum hydroxide/magnesium hydroxide/simethicone Suspension 30 milliLiter(s) Oral every 4 hours PRN Dyspepsia  dextrose Oral Gel 15 Gram(s) Oral once PRN Blood Glucose LESS THAN 70 milliGRAM(s)/deciliter  lidocaine   4% Patch 1 Patch Transdermal daily PRN pain  melatonin 3 milliGRAM(s) Oral at bedtime PRN Insomnia      EXAM:  Vital Signs Last 24 Hrs  T(C): 36.4 (22 Feb 2023 06:10), Max: 36.8 (21 Feb 2023 21:19)  T(F): 97.5 (22 Feb 2023 06:10), Max: 98.2 (21 Feb 2023 21:19)  HR: 62 (22 Feb 2023 08:30) (51 - 79)  BP: 139/70 (22 Feb 2023 11:53) (136/49 - 167/63)  BP(mean): --  RR: 20 (22 Feb 2023 06:10) (18 - 20)  SpO2: 97% (22 Feb 2023 11:53) (93% - 100%)    Parameters below as of 22 Feb 2023 11:53    O2 Flow (L/min): 63      GENERAL: The patient is awake and alert in no apparent distress.     LUNGS: Clear to auscultation without wheezing, rales or rhonchi; respirations unlabored                           7.7    6.54  )-----------( 261      ( 22 Feb 2023 06:00 )             27.4       02-22    140  |  94<L>  |  100<H>  ----------------------------<  81  4.8   |  34<H>  |  3.19<H>    Ca    10.0      22 Feb 2023 06:00  Phos  4.7     02-22  Mg     2.80     02-22    TPro  7.4  /  Alb  4.5  /  TBili  0.4  /  DBili  x   /  AST  17  /  ALT  11  /  AlkPhos  84  02-22   < from: Xray Chest 1 View- PORTABLE-Urgent (Xray Chest 1 View- PORTABLE-Urgent .) (02.14.23 @ 20:36) >    ACC: [id number] EXAM:  XR CHEST PORTABLE URGENT 1V   ORDERED BY: MARY BETH LESLIE     PROCEDURE DATE:  02/14/2023          INTERPRETATION:  CLINICAL INFORMATION: Dyspnea    TIME OF EXAMINATION: February 14 at 7:48 PM    EXAM: Portable chest    FINDINGS:  Bilateral perihilar opacities with left effusion consistent with edema.   Heart is enlarged with sternotomy wires and mitral valve prosthesis.    No focal consolidations.    No pneumothorax.        COMPARISON: February 4, 2023        IMPRESSION: CHF.    --- End of Report ---              < end of copied text >      PROBLEM LIST:  72y Female with HEALTH ISSUES - PROBLEM Dx:  Epistaxis    Chronic atrial fibrillation    COPD, severe    CHF (congestive heart failure)    History of mitral valve replacement with mechanical valve    Hypertension    Need for prophylactic measure    Type 2 diabetes mellitus              RECS:  follow up ct chest  on bumex ggt  on 02  prednisone 5mg  on  bronchodilators  getting coumadin- doubt VTE with therapeutic INR and no change in 02 requirements  abdominal pain? unclear?   GERD? drop in h/h- GIB?  primary team f/u        Please call with any questions.    Irma Eaton, DO  [facility] Pulmonary/Sleep Medicine  [phone]

## 2023-02-22 NOTE — PROGRESS NOTE ADULT - PROBLEM SELECTOR PLAN 1
as per pulm , cont bronchodilators  BPAP at night  prednisone  taper 02  diuresis per cardio- will obtain chf team eval

## 2023-02-22 NOTE — PROGRESS NOTE ADULT - SUBJECTIVE AND OBJECTIVE BOX
SUBJECTIVE / OVERNIGHT EVENTS:pt seen and examined, pt says she feels little better than yesterday but still feels sob but c/o chest pain on deep breathing   23    MEDICATIONS  (STANDING):  aMIOdarone    Tablet 200 milliGRAM(s) Oral daily  budesonide 160 MICROgram(s)/formoterol 4.5 MICROgram(s) Inhaler 2 Puff(s) Inhalation two times a day  buMETAnide Infusion 1 mG/Hr (5 mL/Hr) IV Continuous <Continuous>  dextrose 5%. 1000 milliLiter(s) (100 mL/Hr) IV Continuous <Continuous>  dextrose 5%. 1000 milliLiter(s) (50 mL/Hr) IV Continuous <Continuous>  dextrose 50% Injectable 25 Gram(s) IV Push once  dextrose 50% Injectable 12.5 Gram(s) IV Push once  dextrose 50% Injectable 25 Gram(s) IV Push once  ferrous    sulfate 325 milliGRAM(s) Oral daily  glucagon  Injectable 1 milliGRAM(s) IntraMuscular once  hydrALAZINE 50 milliGRAM(s) Oral three times a day  insulin detemir injectable (LEVEMIR) 25 Unit(s) SubCutaneous at bedtime  insulin lispro (ADMELOG) corrective regimen sliding scale   SubCutaneous three times a day before meals  insulin lispro (ADMELOG) corrective regimen sliding scale   SubCutaneous at bedtime  insulin lispro Injectable (ADMELOG) 5 Unit(s) SubCutaneous three times a day before meals  isosorbide   mononitrate ER Tablet (IMDUR) 30 milliGRAM(s) Oral daily  pantoprazole    Tablet 40 milliGRAM(s) Oral before breakfast  petrolatum white Ointment 1 Application(s) Topical every 6 hours  predniSONE   Tablet 5 milliGRAM(s) Oral daily  simvastatin 10 milliGRAM(s) Oral at bedtime  sodium chloride 0.65% Nasal 1 Spray(s) Both Nostrils every 4 hours    MEDICATIONS  (PRN):  acetaminophen     Tablet .. 650 milliGRAM(s) Oral every 6 hours PRN Temp greater or equal to 38C (100.4F), Mild Pain (1 - 3)  albuterol    90 MICROgram(s) HFA Inhaler 2 Puff(s) Inhalation every 6 hours PRN Bronchospasm  aluminum hydroxide/magnesium hydroxide/simethicone Suspension 30 milliLiter(s) Oral every 4 hours PRN Dyspepsia  dextrose Oral Gel 15 Gram(s) Oral once PRN Blood Glucose LESS THAN 70 milliGRAM(s)/deciliter  lidocaine   4% Patch 1 Patch Transdermal daily PRN pain  melatonin 3 milliGRAM(s) Oral at bedtime PRN Insomnia    Vital Signs Last 24 Hrs  T(C): 36.4 (23 @ 20:26), Max: 36.8 (23 @ 14:33)  T(F): 97.6 (23 @ 20:26), Max: 98.3 (23 @ 14:33)  HR: 70 (23 @ 20:26) (51 - 70)  BP: 133/64 (23 @ 20:26) (133/64 - 139/70)  BP(mean): --  RR: 20 (23 @ 06:10) (20 - 20)  SpO2: 96% (23 @ 20:26) (94% - 100%)            Constitutional: No fever, fatigue  Skin: No rash.  Eyes: No recent vision problems or eye pain.  ENT: No congestion, ear pain, or sore throat.  Cardiovascular: No chest pain or palpation.  Respiratory: sob+  Gastrointestinal: No abdominal pain, nausea, vomiting, or diarrhea.  Genitourinary: No dysuria.  Musculoskeletal: No joint swelling.  Neurologic: No headache.    PHYSICAL EXAM:  GENERAL: NAD  EYES: EOMI, PERRLA  NECK: Supple, No JVD  CHEST/LUNG: dec breath sounds at bases  HEART:  S1 , S2 +  ABDOMEN: soft, bs+  EXTREMITIES:  edema+  NEUROLOGY:alert awake oriented    LABS:      140  |  94<L>  |  100<H>  ----------------------------<  81  4.8   |  34<H>  |  3.19<H>    Ca    10.0      2023 06:00  Phos  4.7       Mg     2.80         TPro  7.4  /  Alb  4.5  /  TBili  0.4  /  DBili      /  AST  17  /  ALT  11  /  AlkPhos  84      Creatinine Trend: 3.19 <--, 3.13 <--, 2.94 <--, 2.55 <--, 2.40 <--, 2.47 <--, 2.42 <--                        7.7    6.54  )-----------( 261      ( 2023 06:00 )             27.4     Urine Studies:  Urinalysis Basic - ( 2023 18:14 )    Color: Yellow / Appearance: Clear / S.014 / pH:   Gluc:  / Ketone: Negative  / Bili: Negative / Urobili: <2 mg/dL   Blood:  / Protein: Negative / Nitrite: Negative   Leuk Esterase: Negative / RBC: 1 /HPF / WBC 1 /HPF   Sq Epi:  / Non Sq Epi:  / Bacteria:       Creatinine, Random Urine: 101 mg/dL ( @ 18:14)  Creatinine, Random Urine: 42 mg/dL ( @ 12:06)          LIVER FUNCTIONS - ( 2023 06:00 )  Alb: 4.5 g/dL / Pro: 7.4 g/dL / ALK PHOS: 84 U/L / ALT: 11 U/L / AST: 17 U/L / GGT: x           PT/INR - ( 2023 06:00 )   PT: 51.5 sec;   INR: 4.37 ratio         PTT - ( 2023 07:39 )  PTT:50.8 sec    Creatinine, Random Urine: 101 mg/dL ( @ 18:14)  Creatinine, Random Urine: 42 mg/dL ( @ 12:06)            PT/INR - ( 2023 07:39 )   PT: 43.2 sec;   INR: 3.68 ratio         PTT - ( 2023 07:39 )  PTT:50.8 sec      Imaging Personally Reviewed:yes    Consultant(s) Notes Reviewed:  yes    Care Discussed with Consultants/Other Providers:yes

## 2023-02-22 NOTE — PROGRESS NOTE ADULT - SUBJECTIVE AND OBJECTIVE BOX
[facility] NEPHROLOGY- PROGRESS NOTE    72 year old Female with history of COPD, CHF presents with weakness. Nephrology consulted for elevated Scr.    REVIEW OF SYSTEMS:  Gen: no fevers  Cards: no chest pain  Resp: + dyspnea  GI: no nausea or vomiting or diarrhea, + abdominal pain  Vascular: + LE edema    No Known Allergies      Hospital Medications: Medications reviewed      VITALS:  T(F): 97.5 (23 @ 06:10), Max: 98.2 (23 @ 21:19)  HR: 62 (23 @ 08:30)  BP: 139/70 (23 @ 11:53)  RR: 20 (23 @ 06:10)  SpO2: 97% (23 @ 11:53)  Wt(kg): --     @ 07:01  -   @ 07:00  --------------------------------------------------------  IN: 270 mL / OUT: 1350 mL / NET: -1080 mL     @ 07:01  -   @ 13:28  --------------------------------------------------------  IN: 0 mL / OUT: 400 mL / NET: -400 mL        PHYSICAL EXAM:    Gen: NAD, calm  Cards: RRR, +S1/S2, no M/G/R  Resp: bibasilar rales  GI: soft, + TTP in epigastric area, ND, NABS  Vascular: 1+ LE edema B/L      LABS:      140  |  94<L>  |  100<H>  ----------------------------<  81  4.8   |  34<H>  |  3.19<H>    Ca    10.0      2023 06:00  Phos  4.7       Mg     2.80         TPro  7.4  /  Alb  4.5  /  TBili  0.4  /  DBili      /  AST  17  /  ALT  11  /  AlkPhos  84      Creatinine Trend: 3.19 <--, 3.13 <--, 2.94 <--, 2.55 <--, 2.40 <--, 2.47 <--, 2.42 <--                        7.7    6.54  )-----------( 261      ( 2023 06:00 )             27.4     Urine Studies:  Urinalysis Basic - ( 2023 18:14 )    Color: Yellow / Appearance: Clear / S.014 / pH:   Gluc:  / Ketone: Negative  / Bili: Negative / Urobili: <2 mg/dL   Blood:  / Protein: Negative / Nitrite: Negative   Leuk Esterase: Negative / RBC: 1 /HPF / WBC 1 /HPF   Sq Epi:  / Non Sq Epi:  / Bacteria:       Creatinine, Random Urine: 101 mg/dL ( @ 18:14)  Creatinine, Random Urine: 42 mg/dL ( @ 12:06)      < from: CT Chest No Cont (23 @ 09:24) >  IMPRESSION:  Exam limited by respiratory motion and lack of IV contrast.    Pulmonary edema left greater than right.    Similar-appearing 3.2 cm left upper pole renal mass not well evaluated on   this noncontrast scan, but shown to contain internal vascularity on renal   ultrasound from 2021, suspicious for renal neoplasm. Further   evaluation with contrast-enhanced CT or MRI abdomen (renal mass protocol)   is recommended.    --- End of Report ---    < end of copied text >      < from: CT Abdomen and Pelvis No Cont (23 @ 09:26) >  KIDNEYS/URETERS: Similar-appearing 3.2 cm left upper pole renal mass when   compared to CT chest from 2023 not well evaluated on this   noncontrast scan.    < end of copied text >

## 2023-02-22 NOTE — CHART NOTE - NSCHARTNOTEFT_GEN_A_CORE
Progress note:    Per yesterday's examination, pt was complaining of abdominal pain and shortness of breath. CT chest/abdomen/pelvis WITHOUT contrast was obtained which showed bilateral pulmonary edema Left worse then right, with renal mass possible concern for renal neoplasm. Per examination today pt is still complaining of epigastric to midsternal chest pain today which worsens when with respiration. Pt is on 2-3L nasal cannula, and is on BIPAP at night, currently on bumex drip. Pt is currently refusing pantoprazole. Case was discussed with Dr. Newman.     Epigastric/chest pain  - Dr. Merida (cardiology) made aware   - [ ] Heart failure team consulted - f/u rec  - [ ] STAT echo ordered - will f/u result     Renal mass  - Case discussed with urology team. Recommending outpt workup/follow up  - Continue to monitor creatinine   - Monitor urine outpt

## 2023-02-23 LAB
ANION GAP SERPL CALC-SCNC: 11 MMOL/L — SIGNIFICANT CHANGE UP (ref 7–14)
BUN SERPL-MCNC: 99 MG/DL — HIGH (ref 7–23)
CALCIUM SERPL-MCNC: 10.4 MG/DL — SIGNIFICANT CHANGE UP (ref 8.4–10.5)
CHLORIDE SERPL-SCNC: 90 MMOL/L — LOW (ref 98–107)
CO2 SERPL-SCNC: 38 MMOL/L — HIGH (ref 22–31)
CREAT SERPL-MCNC: 2.83 MG/DL — HIGH (ref 0.5–1.3)
EGFR: 17 ML/MIN/1.73M2 — LOW
GLUCOSE BLDC GLUCOMTR-MCNC: 106 MG/DL — HIGH (ref 70–99)
GLUCOSE BLDC GLUCOMTR-MCNC: 111 MG/DL — HIGH (ref 70–99)
GLUCOSE BLDC GLUCOMTR-MCNC: 114 MG/DL — HIGH (ref 70–99)
GLUCOSE BLDC GLUCOMTR-MCNC: 88 MG/DL — SIGNIFICANT CHANGE UP (ref 70–99)
GLUCOSE BLDC GLUCOMTR-MCNC: 98 MG/DL — SIGNIFICANT CHANGE UP (ref 70–99)
GLUCOSE SERPL-MCNC: 79 MG/DL — SIGNIFICANT CHANGE UP (ref 70–99)
HCT VFR BLD CALC: 29 % — LOW (ref 34.5–45)
HGB BLD-MCNC: 8.3 G/DL — LOW (ref 11.5–15.5)
INR BLD: 5.33 RATIO — CRITICAL HIGH (ref 0.88–1.16)
MAGNESIUM SERPL-MCNC: 2.6 MG/DL — SIGNIFICANT CHANGE UP (ref 1.6–2.6)
MCHC RBC-ENTMCNC: 28.6 GM/DL — LOW (ref 32–36)
MCHC RBC-ENTMCNC: 29.5 PG — SIGNIFICANT CHANGE UP (ref 27–34)
MCV RBC AUTO: 103.2 FL — HIGH (ref 80–100)
NRBC # BLD: 0 /100 WBCS — SIGNIFICANT CHANGE UP (ref 0–0)
NRBC # FLD: 0.07 K/UL — HIGH (ref 0–0)
PHOSPHATE SERPL-MCNC: 3.3 MG/DL — SIGNIFICANT CHANGE UP (ref 2.5–4.5)
PLATELET # BLD AUTO: 288 K/UL — SIGNIFICANT CHANGE UP (ref 150–400)
POTASSIUM SERPL-MCNC: 4.3 MMOL/L — SIGNIFICANT CHANGE UP (ref 3.5–5.3)
POTASSIUM SERPL-SCNC: 4.3 MMOL/L — SIGNIFICANT CHANGE UP (ref 3.5–5.3)
PROTHROM AB SERPL-ACNC: 62.9 SEC — HIGH (ref 10.5–13.4)
RBC # BLD: 2.81 M/UL — LOW (ref 3.8–5.2)
RBC # FLD: 14.4 % — SIGNIFICANT CHANGE UP (ref 10.3–14.5)
SODIUM SERPL-SCNC: 139 MMOL/L — SIGNIFICANT CHANGE UP (ref 135–145)
TROPONIN T, HIGH SENSITIVITY RESULT: 84 NG/L — CRITICAL HIGH
WBC # BLD: 7.44 K/UL — SIGNIFICANT CHANGE UP (ref 3.8–10.5)
WBC # FLD AUTO: 7.44 K/UL — SIGNIFICANT CHANGE UP (ref 3.8–10.5)

## 2023-02-23 PROCEDURE — 99223 1ST HOSP IP/OBS HIGH 75: CPT

## 2023-02-23 PROCEDURE — 99232 SBSQ HOSP IP/OBS MODERATE 35: CPT

## 2023-02-23 PROCEDURE — 93306 TTE W/DOPPLER COMPLETE: CPT | Mod: 26

## 2023-02-23 RX ORDER — ONDANSETRON 8 MG/1
4 TABLET, FILM COATED ORAL THREE TIMES A DAY
Refills: 0 | Status: DISCONTINUED | OUTPATIENT
Start: 2023-02-23 | End: 2023-03-06

## 2023-02-23 RX ORDER — PHYTONADIONE (VIT K1) 5 MG
2.5 TABLET ORAL ONCE
Refills: 0 | Status: COMPLETED | OUTPATIENT
Start: 2023-02-23 | End: 2023-02-23

## 2023-02-23 RX ADMIN — Medication 25 UNIT(S): at 00:29

## 2023-02-23 RX ADMIN — SIMVASTATIN 10 MILLIGRAM(S): 20 TABLET, FILM COATED ORAL at 22:47

## 2023-02-23 RX ADMIN — Medication 325 MILLIGRAM(S): at 12:29

## 2023-02-23 RX ADMIN — PANTOPRAZOLE SODIUM 40 MILLIGRAM(S): 20 TABLET, DELAYED RELEASE ORAL at 06:35

## 2023-02-23 RX ADMIN — Medication 1 APPLICATION(S): at 12:28

## 2023-02-23 RX ADMIN — BUDESONIDE AND FORMOTEROL FUMARATE DIHYDRATE 2 PUFF(S): 160; 4.5 AEROSOL RESPIRATORY (INHALATION) at 23:30

## 2023-02-23 RX ADMIN — Medication 1 APPLICATION(S): at 23:30

## 2023-02-23 RX ADMIN — Medication 50 MILLIGRAM(S): at 22:47

## 2023-02-23 RX ADMIN — Medication 1 SPRAY(S): at 22:56

## 2023-02-23 RX ADMIN — ISOSORBIDE MONONITRATE 30 MILLIGRAM(S): 60 TABLET, EXTENDED RELEASE ORAL at 12:30

## 2023-02-23 RX ADMIN — Medication 25 UNIT(S): at 22:47

## 2023-02-23 RX ADMIN — BUMETANIDE 5 MG/HR: 0.25 INJECTION INTRAMUSCULAR; INTRAVENOUS at 22:41

## 2023-02-23 RX ADMIN — Medication 2.5 MILLIGRAM(S): at 10:17

## 2023-02-23 RX ADMIN — Medication 5 MILLIGRAM(S): at 06:35

## 2023-02-23 RX ADMIN — Medication 50 MILLIGRAM(S): at 14:01

## 2023-02-23 RX ADMIN — BUMETANIDE 5 MG/HR: 0.25 INJECTION INTRAMUSCULAR; INTRAVENOUS at 06:39

## 2023-02-23 RX ADMIN — Medication 50 MILLIGRAM(S): at 06:35

## 2023-02-23 RX ADMIN — Medication 1 SPRAY(S): at 10:18

## 2023-02-23 RX ADMIN — BUDESONIDE AND FORMOTEROL FUMARATE DIHYDRATE 2 PUFF(S): 160; 4.5 AEROSOL RESPIRATORY (INHALATION) at 10:18

## 2023-02-23 RX ADMIN — AMIODARONE HYDROCHLORIDE 200 MILLIGRAM(S): 400 TABLET ORAL at 06:35

## 2023-02-23 RX ADMIN — ONDANSETRON 4 MILLIGRAM(S): 8 TABLET, FILM COATED ORAL at 15:05

## 2023-02-23 NOTE — CHART NOTE - NSCHARTNOTEFT_GEN_A_CORE
Pt's INR was 4.37 - case discussed with Dr. Newman, per attending rec: hold Coumadin for 2 days and recheck INR on 2/24 to re-eval. Today's INR was 5.33 - as there was no apparent sign of bleeding with stable hgb, per Dr. Newman's rec 2.5mg vitamin k was ordered and given. F/u INR 2/24 AM.

## 2023-02-23 NOTE — CONSULT NOTE ADULT - NS ATTEND AMEND GEN_ALL_CORE FT
Ms. Alcantara is a 72 year old woman with HFpEF, HTN (diagnosed in 30's and reportedly well controlled with medications), severe MR s/p mechanical MVR 1999 ([facility] with Dr. Anderson) and severe TR s/p TVR 2012 ([facility]), ?COPD (home 02; no prior tobacco use), AFib on Coumadin (s/p ablation), PHTN, CKD 3 (b/l SCr ~1.3-1.6 now worsened), MIGUEL, ? RCC, cirrhosis (on imaging) and gout who presented on 2/4 with epistaxis as well as SOB. Of note has had multiple HF hospitalizations. Had met her in 2020 when some concern for mitral stenosis. Had undergone R/LHC which showed no mitral stenosis. RHC at the time also showed mildly elevated filling pressures. On exam, obese, NAD, JVP difficult to appreciate but may be approx 12 cm w/ v waves, RRR, no m/r/g, CTAB, no pedal edema. Labs reviewed - Hb 8.3, BUN/Cr 99/2.8, BNP 2800. Overall unclear etiology of profound dyspnea but may be d/t underappreciated mitral stenosis. Would be difficult to do ELIZABETH given her body habitus and risk of aspiration.  - c/w bumex gtt as above for now  - c/w hydral as above  - discussed at length disease process and no clear intervention will benefit her  - recommended GOC conversation with her   - prognosis guarded

## 2023-02-23 NOTE — PROGRESS NOTE ADULT - SUBJECTIVE AND OBJECTIVE BOX
[facility] NEPHROLOGY- PROGRESS NOTE    72 year old Female with history of COPD, CHF presents with weakness. Nephrology consulted for elevated Scr.    REVIEW OF SYSTEMS:  Gen: no fevers  Cards: no chest pain  Resp: + dyspnea  GI: no nausea or vomiting or diarrhea, + abdominal pain  Vascular: + LE edema    No Known Allergies      Hospital Medications: Medications reviewed      VITALS:  T(F): 97.5 (23 @ 05:51), Max: 98.3 (23 @ 14:33)  HR: 61 (23 @ 05:51)  BP: 133/64 (23 @ 20:26)  RR: 18 (23 @ 05:51)  SpO2: 95% (23 @ 07:25)  Wt(kg): --     @ 07:01  -   @ 07:00  --------------------------------------------------------  IN: 0 mL / OUT: 400 mL / NET: -400 mL        PHYSICAL EXAM:    Gen: NAD, calm  Cards: RRR, +S1/S2, no M/G/R  Resp: bibasilar rales  GI: soft, + TTP in epigastric area, ND, NABS  Vascular: 1+ LE edema B/L      LABS:      139  |  90<L>  |  99<H>  ----------------------------<  79  4.3   |  38<H>  |  2.83<H>    Ca    10.4      2023 07:00  Phos  3.3       Mg     2.60         TPro  7.4  /  Alb  4.5  /  TBili  0.4  /  DBili      /  AST  17  /  ALT  11  /  AlkPhos  84      Creatinine Trend: 2.83 <--, 3.19 <--, 3.13 <--, 2.94 <--, 2.55 <--, 2.40 <--, 2.47 <--                        8.3    7.44  )-----------( 288      ( 2023 07:00 )             29.0     Urine Studies:  Urinalysis Basic - ( 2023 18:14 )    Color: Yellow / Appearance: Clear / S.014 / pH:   Gluc:  / Ketone: Negative  / Bili: Negative / Urobili: <2 mg/dL   Blood:  / Protein: Negative / Nitrite: Negative   Leuk Esterase: Negative / RBC: 1 /HPF / WBC 1 /HPF   Sq Epi:  / Non Sq Epi:  / Bacteria:       Creatinine, Random Urine: 101 mg/dL ( @ 18:14)  Creatinine, Random Urine: 42 mg/dL ( @ 12:06)

## 2023-02-23 NOTE — PROGRESS NOTE ADULT - SUBJECTIVE AND OBJECTIVE BOX
PULMONARY PROGRESS NOTE    DAMARI GUERRERO  MRN-[id number]    Patient is a 72y old  Female who presents with a chief complaint of Epistaxis (22 Feb 2023 13:28)      HPI:  seen this morning on 3L    ROS:   -    ACTIVE MEDICATION LIST:  MEDICATIONS  (STANDING):  aMIOdarone    Tablet 200 milliGRAM(s) Oral daily  budesonide 160 MICROgram(s)/formoterol 4.5 MICROgram(s) Inhaler 2 Puff(s) Inhalation two times a day  buMETAnide Infusion 1 mG/Hr (5 mL/Hr) IV Continuous <Continuous>  dextrose 5%. 1000 milliLiter(s) (50 mL/Hr) IV Continuous <Continuous>  dextrose 5%. 1000 milliLiter(s) (100 mL/Hr) IV Continuous <Continuous>  dextrose 50% Injectable 25 Gram(s) IV Push once  dextrose 50% Injectable 12.5 Gram(s) IV Push once  dextrose 50% Injectable 25 Gram(s) IV Push once  ferrous    sulfate 325 milliGRAM(s) Oral daily  glucagon  Injectable 1 milliGRAM(s) IntraMuscular once  hydrALAZINE 50 milliGRAM(s) Oral three times a day  insulin detemir injectable (LEVEMIR) 25 Unit(s) SubCutaneous at bedtime  insulin lispro (ADMELOG) corrective regimen sliding scale   SubCutaneous three times a day before meals  insulin lispro (ADMELOG) corrective regimen sliding scale   SubCutaneous at bedtime  insulin lispro Injectable (ADMELOG) 5 Unit(s) SubCutaneous three times a day before meals  isosorbide   mononitrate ER Tablet (IMDUR) 30 milliGRAM(s) Oral daily  pantoprazole    Tablet 40 milliGRAM(s) Oral before breakfast  petrolatum white Ointment 1 Application(s) Topical every 6 hours  phytonadione   Solution 2.5 milliGRAM(s) Oral once  predniSONE   Tablet 5 milliGRAM(s) Oral daily  simvastatin 10 milliGRAM(s) Oral at bedtime  sodium chloride 0.65% Nasal 1 Spray(s) Both Nostrils every 4 hours    MEDICATIONS  (PRN):  acetaminophen     Tablet .. 650 milliGRAM(s) Oral every 6 hours PRN Temp greater or equal to 38C (100.4F), Mild Pain (1 - 3)  albuterol    90 MICROgram(s) HFA Inhaler 2 Puff(s) Inhalation every 6 hours PRN Bronchospasm  aluminum hydroxide/magnesium hydroxide/simethicone Suspension 30 milliLiter(s) Oral every 4 hours PRN Dyspepsia  dextrose Oral Gel 15 Gram(s) Oral once PRN Blood Glucose LESS THAN 70 milliGRAM(s)/deciliter  lidocaine   4% Patch 1 Patch Transdermal daily PRN pain  melatonin 3 milliGRAM(s) Oral at bedtime PRN Insomnia      EXAM:  Vital Signs Last 24 Hrs  T(C): 36.4 (23 Feb 2023 05:51), Max: 36.8 (22 Feb 2023 14:33)  T(F): 97.5 (23 Feb 2023 05:51), Max: 98.3 (22 Feb 2023 14:33)  HR: 61 (23 Feb 2023 05:51) (56 - 70)  BP: 133/64 (22 Feb 2023 20:26) (133/64 - 139/70)  BP(mean): --  RR: 18 (23 Feb 2023 05:51) (18 - 18)  SpO2: 95% (23 Feb 2023 07:25) (95% - 99%)    Parameters below as of 23 Feb 2023 05:51  Patient On (Oxygen Delivery Method): BiPAP/CPAP        GENERAL: The patient is awake and alert in no apparent distress.     LUNGS: Clear to auscultation without wheezing                           8.3    7.44  )-----------( 288      ( 23 Feb 2023 07:00 )             29.0       02-23    139  |  90<L>  |  99<H>  ----------------------------<  79  4.3   |  38<H>  |  2.83<H>    Ca    10.4      23 Feb 2023 07:00  Phos  3.3     02-23  Mg     2.60     02-23    TPro  7.4  /  Alb  4.5  /  TBili  0.4  /  DBili  x   /  AST  17  /  ALT  11  /  AlkPhos  84  02-22     < from: CT Chest No Cont (02.22.23 @ 09:24) >    ACC: [id number] EXAM:  CT ABDOMEN AND PELVIS   ORDERED BY: CARLINE CAIN     ACC: [id number] EXAM:  CT CHEST   ORDERED BY: CARLINE CAIN     PROCEDURE DATE:  02/22/2023          INTERPRETATION:  CLINICAL INFORMATION: Cough with abdominal pain evaluate   for acute process.    COMPARISON: CT chest 1/19/2023 and January 8, 2022, CT abdomen pelvis   10/22/2022. Correlation is made to renal ultrasound from April 6, 2021.    CONTRAST/COMPLICATIONS:  IV Contrast: NONE  Oral Contrast: NONE  Complications: None reported at time of study completion    PROCEDURE:  CT of the Chest, Abdomen and Pelvis was performed.  Sagittal and coronal reformats were performed.    FINDINGS:  CHEST:  Exam limited by respiratory motion.  LUNGS AND LARGE AIRWAYS:Diffuse bilateral groundglass opacities, left   than right, with interlobular septal thickening. Similar-appearing left   upper lobe atelectasis versus scarring. Trace left basilar atelectasis.   0.5 cm right apex nodule (2, 12), unchanged since January 8, 2022.  PLEURA: No pleural effusion.  VESSELS: Coronary artery and aortic calcifications.  HEART: Cardiomegaly. No pericardial effusion. Mitral and tricuspid   valvular replacements.  MEDIASTINUM AND CARMELA: Calcified mediastinal lymph nodes.  CHEST WALL AND LOWER NECK: Status post prior median sternotomy.   Nonspecific surgical clips in the right supraclavicular region.    ABDOMEN AND PELVIS:  Full examination of the intra-abdominal viscera and vasculature is   limited without the addition ofIV contrast.    LIVER: Cirrhosis.  BILE DUCTS: Normal caliber.  GALLBLADDER: Cholelithiasis.  SPLEEN: Within normal limits.  PANCREAS: Within normal limits.  ADRENALS: Within normal limits.  KIDNEYS/URETERS: Similar-appearing 3.2 cm left upper pole renal mass when   compared to CT chest from 1/19/2023 not well evaluated on this   noncontrast scan.    BLADDER: Within normal limits.  REPRODUCTIVE ORGANS: Uterus and adnexa within normal limits.    BOWEL: Colonic diverticulosis without diverticulitis. No bowel   obstruction. Appendix is normal.  PERITONEUM: No ascites. No free air.  VESSELS: Atherosclerotic changes.  RETROPERITONEUM/LYMPH NODES: No lymphadenopathy.  ABDOMINAL WALL: Small fat-containing umbilical hernia.  BONES: Degenerative changes.    IMPRESSION:  Exam limited by respiratory motion and lack of IV contrast.    Pulmonary edema left greater than right.    Similar-appearing 3.2 cm left upper pole renal mass not well evaluated on   this noncontrast scan, but shown to contain internal vascularity on renal   ultrasound from April 6, 2021, suspicious for renal neoplasm. Further   evaluation with contrast-enhanced CT or MRI abdomen (renal mass protocol)   is recommended.    --- End of Report ---          RADHA TY MD; Resident Radiologist  This document has been electronically signed.  ZOE OROZCO MD; Attending Radiologist  This document has been electronically signed. Feb 22 2023 11:44AM    < end of copied text >      PROBLEM LIST:  72y Female with HEALTH ISSUES - PROBLEM Dx:  Epistaxis    Chronic atrial fibrillation    COPD, severe    CHF (congestive heart failure)    History of mitral valve replacement with mechanical valve    Hypertension    Need for prophylactic measure    Type 2 diabetes mellitus           RECS:  diuresis per renal  continue prednisone 5  NC  should continue to be on bpap qhs      Please call with any questions.    Irma Eaton,   [facility] Pulmonary/Sleep Medicine  [phone]

## 2023-02-23 NOTE — CONSULT NOTE ADULT - SUBJECTIVE AND OBJECTIVE BOX
Patient is a 72y old  Female who presents with a chief complaint of Epistaxis (2023 09:47)      HPI:  71 y/o Female with PMHx of Gout, COPD, HTN, DM, CHF, CKD, Afib, Pulm HTN, 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 Patient was recently here for COPD exacerbation and discharged home with therapeutic INR. She was in a relatively baseline state of health and uses 3L NC at home until this PM she started having diffuse epistaxis for about 30 minutes. She states it was jacey blood and she used up almost 1 roll of paper towels. She states she has been taking her medication consistently and able to state that she has taken her warfarin 5mg this AM. Denies chest pain, dyspnea, diarrhea, fever, chills, cough, URI symptoms, headaches. She does have chronic dizziness. Denies melena or hematochezia.In the ED, NC3L, 168/69. Epistaxis stopped. INR subtherapeutic to 1~. Hgb to ~8 from 10 from last adm      PAST MEDICAL & SURGICAL HISTORY:  Pulmonary hypertension      MIGUEL (obstructive sleep apnea)      Gout      Mitral valve replaced      Tricuspid valve replaced      CHF (congestive heart failure)      Hypertension      Chronic atrial fibrillation      HTN (hypertension)      COPD, severe      History of mitral valve replacement with mechanical valve      History of mitral valve replacement with mechanical valve      Tricuspid valve replaced  mechanical          FAMILY HISTORY:  Family history of hypertension (Father, Sibling)    Family history of stroke    Family history of hypertension (Mother)        Home Medications:  ferrous sulfate 324 mg (65 mg elemental iron) oral tablet: 1 tab(s) orally once a day (2023 17:21)  ProAir HFA 90 mcg/inh inhalation aerosol: 2 puff(s) inhaled every 6 hours (2023 17:21)  simvastatin 10 mg oral tablet: 1 tab(s) orally once a day (at bedtime) (2023 17:21)      Medications:  acetaminophen     Tablet .. 650 milliGRAM(s) Oral every 6 hours PRN  albuterol    90 MICROgram(s) HFA Inhaler 2 Puff(s) Inhalation every 6 hours PRN  aluminum hydroxide/magnesium hydroxide/simethicone Suspension 30 milliLiter(s) Oral every 4 hours PRN  aMIOdarone    Tablet 200 milliGRAM(s) Oral daily  budesonide 160 MICROgram(s)/formoterol 4.5 MICROgram(s) Inhaler 2 Puff(s) Inhalation two times a day  buMETAnide Infusion 1 mG/Hr IV Continuous <Continuous>  dextrose 5%. 1000 milliLiter(s) IV Continuous <Continuous>  dextrose 5%. 1000 milliLiter(s) IV Continuous <Continuous>  dextrose 50% Injectable 25 Gram(s) IV Push once  dextrose 50% Injectable 12.5 Gram(s) IV Push once  dextrose 50% Injectable 25 Gram(s) IV Push once  dextrose Oral Gel 15 Gram(s) Oral once PRN  ferrous    sulfate 325 milliGRAM(s) Oral daily  glucagon  Injectable 1 milliGRAM(s) IntraMuscular once  hydrALAZINE 50 milliGRAM(s) Oral three times a day  insulin detemir injectable (LEVEMIR) 25 Unit(s) SubCutaneous at bedtime  insulin lispro (ADMELOG) corrective regimen sliding scale   SubCutaneous three times a day before meals  insulin lispro (ADMELOG) corrective regimen sliding scale   SubCutaneous at bedtime  insulin lispro Injectable (ADMELOG) 5 Unit(s) SubCutaneous three times a day before meals  isosorbide   mononitrate ER Tablet (IMDUR) 30 milliGRAM(s) Oral daily  lidocaine   4% Patch 1 Patch Transdermal daily PRN  melatonin 3 milliGRAM(s) Oral at bedtime PRN  ondansetron Injectable 4 milliGRAM(s) IV Push three times a day PRN  pantoprazole    Tablet 40 milliGRAM(s) Oral before breakfast  petrolatum white Ointment 1 Application(s) Topical every 6 hours  predniSONE   Tablet 5 milliGRAM(s) Oral daily  simvastatin 10 milliGRAM(s) Oral at bedtime  sodium chloride 0.65% Nasal 1 Spray(s) Both Nostrils every 4 hours      Review of systems:  10 point review of systems completed and are negative unless noted in HPI    Vitals:  T(C): 36.7 (23 @ 09:00), Max: 36.8 (23 @ 14:33)  HR: 68 (23 @ 09:00) (56 - 70)  BP: 120/55 (23 @ 09:00) (120/55 - 138/57)  BP(mean): --  RR: 18 (23 @ 09:00) (18 - 18)  SpO2: 100% (23 @ 09:00) (95% - 100%)    Daily     Daily         I&O's Summary    2023 07:01  -  2023 07:00  --------------------------------------------------------  IN: 0 mL / OUT: 400 mL / NET: -400 mL        Physical Exam:  Appearance: No Acute Distress  Neck: JVD  Cardiovascular: Normal S1 S2  Respiratory: Clear to auscultation bilaterally  Gastrointestinal: Soft, Non-tender	  Skin: No cyanosis	  Neurologic: Non-focal  Extremities: BLE edema  Psychiatry: A & O x 3, Mood & affect appropriate    Labs:                        8.3    7.44  )-----------( 288      ( 2023 07:00 )             29.0         139  |  90<L>  |  99<H>  ----------------------------<  79  4.3   |  38<H>  |  2.83<H>    Ca    10.4      2023 07:00  Phos  3.3       Mg     2.60         TPro  7.4  /  Alb  4.5  /  TBili  0.4  /  DBili  x   /  AST  17  /  ALT  11  /  AlkPhos  84      PT/INR - ( 2023 07:00 )   PT: 62.9 sec;   INR: 5.33 ratio       Serum Pro-Brain Natriuretic Peptide: 2896:  (23 @ 05:30)    Blood Gas Venous - Lactate: 0.9 mmol/L (23 @ 07:42)    Troponin T, High Sensitivity Result: 84:  (23 @ 07:00)          TELEMETRY: AFib     Echocardiogram:  Transthoracic Echocardiogram (23 @ 14:59)   ------------------------------------------------------------------------  CONCLUSIONS:  Technically very difficult study performed with the patient  in the sitting position.  1. Mechanical prosthetic mitral valve replacement. Minimal  mitral regurgitation. Mean transmitral valve gradient  equals 9 mm Hg, which is elevated even in the setting of a  prosthetic valve.  2. Endocardium not well visualized; unable to evaluate left  ventricular systolic function.  3. The right ventricle is not well visualized.  Consider limited repeat imaging with the intravenous  administration of ultrasound enhancing agent for improved  evaluation ofLV systolic function, if clinically  indicated.      Transesophageal Echocardiogram w/o TTE (22 @ 18:09) >  Dimensions:    Normal Values:  LA:            2.0 - 4.0 cm  Ao:            2.0 - 3.8 cm  SEPTUM:    0.6 - 1.2 cm  PWT:           0.6 - 1.1 cm  LVIDd:         3.0 - 5.6 cm  LVIDs:         1.8 - 4.0 cm  EF (Visual Estimate): na %  ------------------------------------------------------------------------  Observations:  Mitral Valve: Mechanical prosthetic mitral valve  replacement. The mechanical leaflets are opening well.  Aortic Valve/Aorta:  Simple atheroma noted in aortic arch/descending aorta.  Left Atrium: No left atrium or left atrial appendage  thrombus.  Left Ventricle: Endocardium notwell visualized; unable to  evaluate left ventricular systolic function.  Pericardium/Pleura: Normal pericardium with no pericardial  effusion.  ------------------------------------------------------------------------  Conclusions:  1. Mechanical prosthetic mitral valve replacement. The  mechanical leaflets are opening well.  2. No left atrium or left atrial appendage thrombus.        EK Lead ECG (23 @ 09:56)     Ventricular Rate 71 BPM    Atrial Rate 70 BPM    QRS Duration 146 ms    Q-T Interval 424 ms    QTC Calculation(Bazett) 460 ms    R Axis 89 degrees    T Axis 57 degrees    Diagnosis Line Atrial fibrillation with a competing junctional pacemaker  Right bundle branch block  Abnormal ECG      Chest 1 View- PORTABLE-Urgent (Xray Chest 1 View- PORTABLE-Urgent .) (23 @ 20:36)     FINDINGS:  Bilateral perihilar opacities with left effusion consistent with edema.   Heart is enlarged with sternotomy wires and mitral valve prosthesis.    No focal consolidations.    No pneumothorax.      Cardiac Cath Lab - Adult (10.06.20 @ 09:19)     HEMODYNAMIC TABLES  Pressures:  -- Left Ventricle (s/edp): 120/16/--  Pressures:  -- Mitral Valve Splice - LV-PCW - LV: 123/7/--  Pressures:  -- Mitral Valve Splice - LV-PCW - PCW: 0/0/--  Pressures:  -- Pulmonary Artery (S/D/M): 50/17/29  Pressures:  -- Pulmonary Capillary Wedge: 21/20/16  Pressures:  -- Right Atrium (a/v/M): 15/20/15  Pressures:  -- Right Ventricle (s/edp): 51/12/--  O2 Sats:  Baseline  O2 Sats:  - HR: 72  O2 Sats:  - Rhythm:  O2 Sats:  -- AO: 8.6/97.3/11.38  O2 Sats:  -- PA: 8.3/68/7.68  O2 Sats:  -- PA: 8.6/70.6/8.26  Valves:  Baseline  Valves:  -- MITRAL: Left diastolic filling period: 31.00  Valves:  -- MITRAL: Mitral valve area: 2.39  Valves:  -- MITRAL: Mitral valve flow: 243.83  Valves:  -- MITRAL: Mitral valve index: 1.14  Valves:  -- MITRAL: Mitral Valve Splice - LV-PCW - Gradient: 7.30  Outputs:  -- OUTPUTS: CO by Princess: 7.56  Outputs:  -- OUTPUTS: Princess cardiac index: 3.61  Outputs:  -- OUTPUTS: O2 consumption: 280.00  OOutputs:  -- RESISTANCES: Pulmonary vascular resistance (Wood Units): 1.72  OOutputs:  -- RESISTANCES: Systemic vascular resistance (dsc): 783.01             Patient is a 72y old  Female who presents with a chief complaint of Epistaxis (2023 09:47)      HPI:  71 y/o Female with PMHx of Gout, COPD, HTN, DM, CHF, CKD, Afib, Pulm HTN, 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 Patient was recently here for COPD exacerbation and discharged home with therapeutic INR. She was in a relatively baseline state of health and uses 3L NC at home until this PM she started having diffuse epistaxis for about 30 minutes. She states it was jacey blood and she used up almost 1 roll of paper towels. She states she has been taking her medication consistently and able to state that she has taken her warfarin 5mg this AM. Denies chest pain, dyspnea, diarrhea, fever, chills, cough, URI symptoms, headaches. She does have chronic dizziness. Denies melena or hematochezia.In the ED, NC3L, 168/69. Epistaxis stopped. INR subtherapeutic to 1~. Hgb to ~8 from 10 from last adm      PAST MEDICAL & SURGICAL HISTORY:  Pulmonary hypertension      MIGUEL (obstructive sleep apnea)      Gout      Mitral valve replaced      Tricuspid valve replaced      CHF (congestive heart failure)      Hypertension      Chronic atrial fibrillation      HTN (hypertension)      COPD, severe      History of mitral valve replacement with mechanical valve      History of mitral valve replacement with mechanical valve      Tricuspid valve replaced  mechanical          FAMILY HISTORY:  Family history of hypertension (Father, Sibling)    Family history of stroke    Family history of hypertension (Mother)        Home Medications:  ferrous sulfate 324 mg (65 mg elemental iron) oral tablet: 1 tab(s) orally once a day (2023 17:21)  ProAir HFA 90 mcg/inh inhalation aerosol: 2 puff(s) inhaled every 6 hours (2023 17:21)  simvastatin 10 mg oral tablet: 1 tab(s) orally once a day (at bedtime) (2023 17:21)      Medications:  acetaminophen     Tablet .. 650 milliGRAM(s) Oral every 6 hours PRN  albuterol    90 MICROgram(s) HFA Inhaler 2 Puff(s) Inhalation every 6 hours PRN  aluminum hydroxide/magnesium hydroxide/simethicone Suspension 30 milliLiter(s) Oral every 4 hours PRN  aMIOdarone    Tablet 200 milliGRAM(s) Oral daily  budesonide 160 MICROgram(s)/formoterol 4.5 MICROgram(s) Inhaler 2 Puff(s) Inhalation two times a day  buMETAnide Infusion 1 mG/Hr IV Continuous <Continuous>  dextrose 5%. 1000 milliLiter(s) IV Continuous <Continuous>  dextrose 5%. 1000 milliLiter(s) IV Continuous <Continuous>  dextrose 50% Injectable 25 Gram(s) IV Push once  dextrose 50% Injectable 12.5 Gram(s) IV Push once  dextrose 50% Injectable 25 Gram(s) IV Push once  dextrose Oral Gel 15 Gram(s) Oral once PRN  ferrous    sulfate 325 milliGRAM(s) Oral daily  glucagon  Injectable 1 milliGRAM(s) IntraMuscular once  hydrALAZINE 50 milliGRAM(s) Oral three times a day  insulin detemir injectable (LEVEMIR) 25 Unit(s) SubCutaneous at bedtime  insulin lispro (ADMELOG) corrective regimen sliding scale   SubCutaneous three times a day before meals  insulin lispro (ADMELOG) corrective regimen sliding scale   SubCutaneous at bedtime  insulin lispro Injectable (ADMELOG) 5 Unit(s) SubCutaneous three times a day before meals  isosorbide   mononitrate ER Tablet (IMDUR) 30 milliGRAM(s) Oral daily  lidocaine   4% Patch 1 Patch Transdermal daily PRN  melatonin 3 milliGRAM(s) Oral at bedtime PRN  ondansetron Injectable 4 milliGRAM(s) IV Push three times a day PRN  pantoprazole    Tablet 40 milliGRAM(s) Oral before breakfast  petrolatum white Ointment 1 Application(s) Topical every 6 hours  predniSONE   Tablet 5 milliGRAM(s) Oral daily  simvastatin 10 milliGRAM(s) Oral at bedtime  sodium chloride 0.65% Nasal 1 Spray(s) Both Nostrils every 4 hours      Review of systems:  10 point review of systems completed and are negative unless noted in HPI    Vitals:  T(C): 36.7 (23 @ 09:00), Max: 36.8 (23 @ 14:33)  HR: 68 (23 @ 09:00) (56 - 70)  BP: 120/55 (23 @ 09:00) (120/55 - 138/57)  BP(mean): --  RR: 18 (23 @ 09:00) (18 - 18)  SpO2: 100% (23 @ 09:00) (95% - 100%)    Daily     Daily         I&O's Summary    2023 07:01  -  2023 07:00  --------------------------------------------------------  IN: 0 mL / OUT: 400 mL / NET: -400 mL        Physical Exam:  Appearance: No Acute Distress  Neck: JVD difficult to assess   Cardiovascular: Normal S1 S2  Respiratory: Clear to auscultation bilaterally  Gastrointestinal: Soft, Non-tender, distended 	  Skin: No cyanosis	  Neurologic: Non-focal  Extremities: 1/2+ pitting BLE edema  Psychiatry: A & O x 3, Mood & affect appropriate    Labs:                        8.3    7.44  )-----------( 288      ( 2023 07:00 )             29.0         139  |  90<L>  |  99<H>  ----------------------------<  79  4.3   |  38<H>  |  2.83<H>    Ca    10.4      2023 07:00  Phos  3.3       Mg     2.60         TPro  7.4  /  Alb  4.5  /  TBili  0.4  /  DBili  x   /  AST  17  /  ALT  11  /  AlkPhos  84      PT/INR - ( 2023 07:00 )   PT: 62.9 sec;   INR: 5.33 ratio       Serum Pro-Brain Natriuretic Peptide: 2896:  (23 @ 05:30)    Blood Gas Venous - Lactate: 0.9 mmol/L (23 @ 07:42)    Troponin T, High Sensitivity Result: 84:  (23 @ 07:00)          TELEMETRY: AFib     Echocardiogram:  Transthoracic Echocardiogram (23 @ 14:59)   ------------------------------------------------------------------------  CONCLUSIONS:  Technically very difficult study performed with the patient  in the sitting position.  1. Mechanical prosthetic mitral valve replacement. Minimal  mitral regurgitation. Mean transmitral valve gradient  equals 9 mm Hg, which is elevated even in the setting of a  prosthetic valve.  2. Endocardium not well visualized; unable to evaluate left  ventricular systolic function.  3. The right ventricle is not well visualized.  Consider limited repeat imaging with the intravenous  administration of ultrasound enhancing agent for improved  evaluation ofLV systolic function, if clinically  indicated.      Transesophageal Echocardiogram w/o TTE (22 @ 18:09) >  Dimensions:    Normal Values:  LA:            2.0 - 4.0 cm  Ao:            2.0 - 3.8 cm  SEPTUM:    0.6 - 1.2 cm  PWT:           0.6 - 1.1 cm  LVIDd:         3.0 - 5.6 cm  LVIDs:         1.8 - 4.0 cm  EF (Visual Estimate): na %  ------------------------------------------------------------------------  Observations:  Mitral Valve: Mechanical prosthetic mitral valve  replacement. The mechanical leaflets are opening well.  Aortic Valve/Aorta:  Simple atheroma noted in aortic arch/descending aorta.  Left Atrium: No left atrium or left atrial appendage  thrombus.  Left Ventricle: Endocardium notwell visualized; unable to  evaluate left ventricular systolic function.  Pericardium/Pleura: Normal pericardium with no pericardial  effusion.  ------------------------------------------------------------------------  Conclusions:  1. Mechanical prosthetic mitral valve replacement. The  mechanical leaflets are opening well.  2. No left atrium or left atrial appendage thrombus.        EK Lead ECG (23 @ 09:56)     Ventricular Rate 71 BPM    Atrial Rate 70 BPM    QRS Duration 146 ms    Q-T Interval 424 ms    QTC Calculation(Bazett) 460 ms    R Axis 89 degrees    T Axis 57 degrees    Diagnosis Line Atrial fibrillation with a competing junctional pacemaker  Right bundle branch block  Abnormal ECG      Chest 1 View- PORTABLE-Urgent (Xray Chest 1 View- PORTABLE-Urgent .) (23 @ 20:36)     FINDINGS:  Bilateral perihilar opacities with left effusion consistent with edema.   Heart is enlarged with sternotomy wires and mitral valve prosthesis.    No focal consolidations.    No pneumothorax.      Cardiac Cath Lab - Adult (10.06.20 @ 09:19)     HEMODYNAMIC TABLES  Pressures:  -- Left Ventricle (s/edp): 120/16/--  Pressures:  -- Mitral Valve Splice - LV-PCW - LV: 123/7/--  Pressures:  -- Mitral Valve Splice - LV-PCW - PCW: 0/0/--  Pressures:  -- Pulmonary Artery (S/D/M): 50/17/29  Pressures:  -- Pulmonary Capillary Wedge: 21/20/16  Pressures:  -- Right Atrium (a/v/M): 15/20/15  Pressures:  -- Right Ventricle (s/edp): 51/12/--  O2 Sats:  Baseline  O2 Sats:  - HR: 72  O2 Sats:  - Rhythm:  O2 Sats:  -- AO: 8.6/97.3/11.38  O2 Sats:  -- PA: 8.3/68/7.68  O2 Sats:  -- PA: 8.6/70.6/8.26  Valves:  Baseline  Valves:  -- MITRAL: Left diastolic filling period: 31.00  Valves:  -- MITRAL: Mitral valve area: 2.39  Valves:  -- MITRAL: Mitral valve flow: 243.83  Valves:  -- MITRAL: Mitral valve index: 1.14  Valves:  -- MITRAL: Mitral Valve Splice - LV-PCW - Gradient: 7.30  Outputs:  -- OUTPUTS: CO by Princess: 7.56  Outputs:  -- OUTPUTS: Princess cardiac index: 3.61  Outputs:  -- OUTPUTS: O2 consumption: 280.00  OOutputs:  -- RESISTANCES: Pulmonary vascular resistance (Wood Units): 1.72  OOutputs:  -- RESISTANCES: Systemic vascular resistance (dsc): 783.01

## 2023-02-23 NOTE — CONSULT NOTE ADULT - ASSESSMENT
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 Patient was recently here for COPD exacerbation and discharged home with therapeutic INR. She was in a relatively baseline state of health and uses 3L NC at home until this PM she started having diffuse epistaxis for about 30 minutes. She states it was jacey blood and she used up almost 1 roll of paper towels. She states she has been taking her medication consistently and able to state that she has taken her warfarin 5mg this AM. Denies chest pain, dyspnea, diarrhea, fever, chills, cough, URI symptoms, headaches. She does have chronic dizziness. Denies melena or hematochezia.In the ED, NC3L, 168/69. Epistaxis stopped. INR subtherapeutic to 1~. Hgb to ~8 from 10 from last admission.     Pertinent labs:  BNP  2896   Lactate  0.9   Troponin  88/84/66     EKG: Atrial fibrillation with a competing junctional pacemaker Right bundle branch block    CXR: Bilateral perihilar opacities with left effusion consistent with edema.   Heart is enlarged with sternotomy wires and mitral valve prosthesis.    RHC:  Cardiac Cath Lab - Adult (10.06.20 @ 09:19)     HEMODYNAMIC TABLES  Pressures:  -- Left Ventricle (s/edp): 120/16/--  Pressures:  -- Mitral Valve Splice - LV-PCW - LV: 123/7/--  Pressures:  -- Mitral Valve Splice - LV-PCW - PCW: 0/0/--  Pressures:  -- Pulmonary Artery (S/D/M): 50/17/29  Pressures:  -- Pulmonary Capillary Wedge: 21/20/16  Pressures:  -- Right Atrium (a/v/M): 15/20/15  Pressures:  -- Right Ventricle (s/edp): 51/12/--  O2 Sats:  Baseline  O2 Sats:  - HR: 72  O2 Sats:  - Rhythm:  O2 Sats:  -- AO: 8.6/97.3/11.38  O2 Sats:  -- PA: 8.3/68/7.68  O2 Sats:  -- PA: 8.6/70.6/8.26  Valves:  Baseline  Valves:  -- MITRAL: Left diastolic filling period: 31.00  Valves:  -- MITRAL: Mitral valve area: 2.39  Valves:  -- MITRAL: Mitral valve flow: 243.83  Valves:  -- MITRAL: Mitral valve index: 1.14  Valves:  -- MITRAL: Mitral Valve Splice - LV-PCW - Gradient: 7.30  Outputs:  -- OUTPUTS: CO by Princess: 7.56  Outputs:  -- OUTPUTS: Princess cardiac index: 3.61  Outputs:  -- OUTPUTS: O2 consumption: 280.00  OOutputs:  -- RESISTANCES: Pulmonary vascular resistance (Wood Units): 1.72  OOutputs:  -- RESISTANCES: Systemic vascular resistance (dsc): 783.01     71 y/o Female with PMHx of Gout, COPD, HTN, DM, CHF, CKD, Afib, Pulm HTN, 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 Patient was recently here for COPD exacerbation and discharged home with therapeutic INR. She was in a relatively baseline state of health and uses 3L NC at home until this PM she started having diffuse epistaxis for about 30 minutes. She states it was jacey blood and she used up almost 1 roll of paper towels. She states she has been taking her medication consistently and able to state that she has taken her warfarin 5mg this AM. Denies chest pain, dyspnea, diarrhea, fever, chills, cough, URI symptoms, headaches. She does have chronic dizziness. Denies melena or hematochezia.In the ED, NC3L, 168/69. Epistaxis stopped. INR subtherapeutic to 1~. Hgb to ~8 from 10 from last admission.     Pertinent labs:  BNP  2896   Lactate  0.9   Troponin  88/84/66     EKG: Atrial fibrillation with a competing junctional pacemaker Right bundle branch block    CXR: Bilateral perihilar opacities with left effusion consistent with edema.   Heart is enlarged with sternotomy wires and mitral valve prosthesis.    RHC:  Cardiac Cath Lab - Adult (10.06.20 @ 09:19)     HEMODYNAMIC TABLES  Pressures:  -- Left Ventricle (s/edp): 120/16/--  Pressures:  -- Mitral Valve Splice - LV-PCW - LV: 123/7/--  Pressures:  -- Mitral Valve Splice - LV-PCW - PCW: 0/0/--  Pressures:  -- Pulmonary Artery (S/D/M): 50/17/29  Pressures:  -- Pulmonary Capillary Wedge: 21/20/16  Pressures:  -- Right Atrium (a/v/M): 15/20/15  Pressures:  -- Right Ventricle (s/edp): 51/12/--  O2 Sats:  Baseline  O2 Sats:  - HR: 72  O2 Sats:  - Rhythm:  O2 Sats:  -- AO: 8.6/97.3/11.38  O2 Sats:  -- PA: 8.3/68/7.68  O2 Sats:  -- PA: 8.6/70.6/8.26  Valves:  Baseline  Valves:  -- MITRAL: Left diastolic filling period: 31.00  Valves:  -- MITRAL: Mitral valve area: 2.39  Valves:  -- MITRAL: Mitral valve flow: 243.83  Valves:  -- MITRAL: Mitral valve index: 1.14  Valves:  -- MITRAL: Mitral Valve Splice - LV-PCW - Gradient: 7.30  Outputs:  -- OUTPUTS: CO by Princess: 7.56  Outputs:  -- OUTPUTS: Princess cardiac index: 3.61  Outputs:  -- OUTPUTS: O2 consumption: 280.00  OOutputs:  -- RESISTANCES: Pulmonary vascular resistance (Wood Units): 1.72  OOutputs:  -- RESISTANCES: Systemic vascular resistance (dsc): 783.01        CT Abdomen and Pelvis No Cont (02.22.23 @ 09:26) >  Sagittal and coronal reformats were performed.    FINDINGS:  CHEST:  Exam limited by respiratory motion.  LUNGS AND LARGE AIRWAYS:Diffuse bilateral groundglass opacities, left   than right, with interlobular septal thickening. Similar-appearing left   upper lobe atelectasis versus scarring. Trace left basilar atelectasis.   0.5 cm right apex nodule (2, 12), unchanged since January 8, 2022.  PLEURA: No pleural effusion.  VESSELS: Coronary artery and aortic calcifications.  HEART: Cardiomegaly. No pericardial effusion. Mitral and tricuspid   valvular replacements.  MEDIASTINUM AND CARMELA: Calcified mediastinal lymph nodes.  CHEST WALL AND LOWER NECK: Status post prior median sternotomy.   Nonspecific surgical clips in the right supraclavicular region.    ABDOMEN AND PELVIS:  Full examination of the intra-abdominal viscera and vasculature is   limited without the addition ofIV contrast.    LIVER: Cirrhosis.  BILE DUCTS: Normal caliber.  GALLBLADDER: Cholelithiasis.  SPLEEN: Within normal limits.  PANCREAS: Within normal limits.  ADRENALS: Within normal limits.  KIDNEYS/URETERS: Similar-appearing 3.2 cm left upper pole renal mass when   compared to CT chest from 1/19/2023 not well evaluated on this   noncontrast scan.    BLADDER: Within normal limits.  REPRODUCTIVE ORGANS: Uterus and adnexa within normal limits.    BOWEL: Colonic diverticulosis without diverticulitis. No bowel   obstruction. Appendix is normal.  PERITONEUM: No ascites. No free air.  VESSELS: Atherosclerotic changes.  RETROPERITONEUM/LYMPH NODES: No lymphadenopathy.  ABDOMINAL WALL: Small fat-containing umbilical hernia.  BONES: Degenerative changes.    IMPRESSION:  Exam limited by respiratory motion and lack of IV contrast.    Pulmonary edema left greater than right.    Similar-appearing 3.2 cm left upper pole renal mass not well evaluated on   this noncontrast scan, but shown to contain internal vascularity on renal   ultrasound from April 6, 2021, suspicious for renal neoplasm. Further   evaluation with contrast-enhanced CT or MRI abdomen (renal mass protocol)   is recommended.

## 2023-02-23 NOTE — PROGRESS NOTE ADULT - SUBJECTIVE AND OBJECTIVE BOX
SUBJECTIVE / OVERNIGHT EVENTS:pt seen and examined, pt says she feels little better than yesterday but still feels sob but c/o chest pain on deep breathing   23    MEDICATIONS  (STANDING):  aMIOdarone    Tablet 200 milliGRAM(s) Oral daily  budesonide 160 MICROgram(s)/formoterol 4.5 MICROgram(s) Inhaler 2 Puff(s) Inhalation two times a day  buMETAnide Infusion 1 mG/Hr (5 mL/Hr) IV Continuous <Continuous>  dextrose 5%. 1000 milliLiter(s) (100 mL/Hr) IV Continuous <Continuous>  dextrose 5%. 1000 milliLiter(s) (50 mL/Hr) IV Continuous <Continuous>  dextrose 50% Injectable 25 Gram(s) IV Push once  dextrose 50% Injectable 12.5 Gram(s) IV Push once  dextrose 50% Injectable 25 Gram(s) IV Push once  ferrous    sulfate 325 milliGRAM(s) Oral daily  glucagon  Injectable 1 milliGRAM(s) IntraMuscular once  hydrALAZINE 50 milliGRAM(s) Oral three times a day  insulin detemir injectable (LEVEMIR) 25 Unit(s) SubCutaneous at bedtime  insulin lispro (ADMELOG) corrective regimen sliding scale   SubCutaneous three times a day before meals  insulin lispro (ADMELOG) corrective regimen sliding scale   SubCutaneous at bedtime  insulin lispro Injectable (ADMELOG) 5 Unit(s) SubCutaneous three times a day before meals  isosorbide   mononitrate ER Tablet (IMDUR) 30 milliGRAM(s) Oral daily  pantoprazole    Tablet 40 milliGRAM(s) Oral before breakfast  petrolatum white Ointment 1 Application(s) Topical every 6 hours  predniSONE   Tablet 5 milliGRAM(s) Oral daily  simvastatin 10 milliGRAM(s) Oral at bedtime  sodium chloride 0.65% Nasal 1 Spray(s) Both Nostrils every 4 hours    MEDICATIONS  (PRN):  acetaminophen     Tablet .. 650 milliGRAM(s) Oral every 6 hours PRN Temp greater or equal to 38C (100.4F), Mild Pain (1 - 3)  albuterol    90 MICROgram(s) HFA Inhaler 2 Puff(s) Inhalation every 6 hours PRN Bronchospasm  aluminum hydroxide/magnesium hydroxide/simethicone Suspension 30 milliLiter(s) Oral every 4 hours PRN Dyspepsia  dextrose Oral Gel 15 Gram(s) Oral once PRN Blood Glucose LESS THAN 70 milliGRAM(s)/deciliter  lidocaine   4% Patch 1 Patch Transdermal daily PRN pain  melatonin 3 milliGRAM(s) Oral at bedtime PRN Insomnia  ondansetron Injectable 4 milliGRAM(s) IV Push three times a day PRN Nausea and/or Vomiting    Vital Signs Last 24 Hrs  T(C): 36.7 (23 @ 09:00), Max: 36.7 (23 @ 09:00)  T(F): 98.1 (23 @ 09:00), Max: 98.1 (23 @ 09:00)  HR: 64 (23 @ 12:30) (56 - 70)  BP: 146/60 (23 @ 12:30) (120/55 - 146/60)  BP(mean): --  RR: 18 (23 @ 09:00) (18 - 18)  SpO2: 100% (23 @ 09:00) (95% - 100%)            Constitutional: No fever, fatigue  Skin: No rash.  Eyes: No recent vision problems or eye pain.  ENT: No congestion, ear pain, or sore throat.  Cardiovascular: No chest pain or palpation.  Respiratory: sob+  Gastrointestinal: No abdominal pain, nausea, vomiting, or diarrhea.  Genitourinary: No dysuria.  Musculoskeletal: No joint swelling.  Neurologic: No headache.    PHYSICAL EXAM:  GENERAL: NAD  EYES: EOMI, PERRLA  NECK: Supple, No JVD  CHEST/LUNG: dec breath sounds at bases  HEART:  S1 , S2 +  ABDOMEN: soft, bs+  EXTREMITIES:  edema+  NEUROLOGY:alert awake oriented    LABS:      139  |  90<L>  |  99<H>  ----------------------------<  79  4.3   |  38<H>  |  2.83<H>    Ca    10.4      2023 07:00  Phos  3.3       Mg     2.60         TPro  7.4  /  Alb  4.5  /  TBili  0.4  /  DBili      /  AST  17  /  ALT  11  /  AlkPhos  84      Creatinine Trend: 2.83 <--, 3.19 <--, 3.13 <--, 2.94 <--, 2.55 <--, 2.40 <--, 2.47 <--                        8.3    7.44  )-----------( 288      ( 2023 07:00 )             29.0     Urine Studies:  Urinalysis Basic - ( 2023 18:14 )    Color: Yellow / Appearance: Clear / S.014 / pH:   Gluc:  / Ketone: Negative  / Bili: Negative / Urobili: <2 mg/dL   Blood:  / Protein: Negative / Nitrite: Negative   Leuk Esterase: Negative / RBC: 1 /HPF / WBC 1 /HPF   Sq Epi:  / Non Sq Epi:  / Bacteria:       Creatinine, Random Urine: 101 mg/dL ( @ 18:14)          LIVER FUNCTIONS - ( 2023 06:00 )  Alb: 4.5 g/dL / Pro: 7.4 g/dL / ALK PHOS: 84 U/L / ALT: 11 U/L / AST: 17 U/L / GGT: x           PT/INR - ( 2023 07:00 )   PT: 62.9 sec;   INR: 5.33 ratio             Imaging Personally Reviewed:yes    Consultant(s) Notes Reviewed:  yes    Care Discussed with Consultants/Other Providers:yes

## 2023-02-23 NOTE — PROGRESS NOTE ADULT - PROBLEM SELECTOR PLAN 1
as per pulm , cont bronchodilators  BPAP at night  prednisone  taper 02  diuresis per cardio-   appreciate chf team input

## 2023-02-23 NOTE — PROGRESS NOTE ADULT - SUBJECTIVE AND OBJECTIVE BOX
Patient seen and examined this am. No new events      MEDICATIONS:    acetaminophen     Tablet .. 650 milliGRAM(s) Oral every 6 hours PRN  albuterol    90 MICROgram(s) HFA Inhaler 2 Puff(s) Inhalation every 6 hours PRN  aluminum hydroxide/magnesium hydroxide/simethicone Suspension 30 milliLiter(s) Oral every 4 hours PRN  aMIOdarone    Tablet 200 milliGRAM(s) Oral daily  budesonide 160 MICROgram(s)/formoterol 4.5 MICROgram(s) Inhaler 2 Puff(s) Inhalation two times a day  buMETAnide Infusion 1 mG/Hr IV Continuous <Continuous>  dextrose 5%. 1000 milliLiter(s) IV Continuous <Continuous>  dextrose 5%. 1000 milliLiter(s) IV Continuous <Continuous>  dextrose 50% Injectable 25 Gram(s) IV Push once  dextrose 50% Injectable 12.5 Gram(s) IV Push once  dextrose 50% Injectable 25 Gram(s) IV Push once  dextrose Oral Gel 15 Gram(s) Oral once PRN  ferrous    sulfate 325 milliGRAM(s) Oral daily  glucagon  Injectable 1 milliGRAM(s) IntraMuscular once  hydrALAZINE 50 milliGRAM(s) Oral three times a day  insulin detemir injectable (LEVEMIR) 25 Unit(s) SubCutaneous at bedtime  insulin lispro (ADMELOG) corrective regimen sliding scale   SubCutaneous three times a day before meals  insulin lispro (ADMELOG) corrective regimen sliding scale   SubCutaneous at bedtime  insulin lispro Injectable (ADMELOG) 5 Unit(s) SubCutaneous three times a day before meals  isosorbide   mononitrate ER Tablet (IMDUR) 30 milliGRAM(s) Oral daily  lidocaine   4% Patch 1 Patch Transdermal daily PRN  melatonin 3 milliGRAM(s) Oral at bedtime PRN  metolazone 5 milliGRAM(s) Oral once  pantoprazole    Tablet 40 milliGRAM(s) Oral before breakfast  petrolatum white Ointment 1 Application(s) Topical every 6 hours  phytonadione   Solution 2.5 milliGRAM(s) Oral once  predniSONE   Tablet 5 milliGRAM(s) Oral daily  simvastatin 10 milliGRAM(s) Oral at bedtime  sodium chloride 0.65% Nasal 1 Spray(s) Both Nostrils every 4 hours      LABS:                          8.3    7.44  )-----------( 288      ( 2023 07:00 )             29.0     02    139  |  90<L>  |  99<H>  ----------------------------<  79  4.3   |  38<H>  |  2.83<H>    Ca    10.4      2023 07:00  Phos  3.3       Mg     2.60         TPro  7.4  /  Alb  4.5  /  TBili  0.4  /  DBili  x   /  AST  17  /  ALT  11  /  AlkPhos  84      CAPILLARY BLOOD GLUCOSE      POCT Blood Glucose.: 88 mg/dL (2023 08:20)  POCT Blood Glucose.: 120 mg/dL (2023 23:53)  POCT Blood Glucose.: 93 mg/dL (2023 22:07)  POCT Blood Glucose.: 100 mg/dL (2023 17:26)  POCT Blood Glucose.: 129 mg/dL (2023 12:36)    PT/INR - ( 2023 07:00 )   PT: 62.9 sec;   INR: 5.33 ratio           Urinalysis Basic - ( 2023 18:14 )    Color: Yellow / Appearance: Clear / S.014 / pH: x  Gluc: x / Ketone: Negative  / Bili: Negative / Urobili: <2 mg/dL   Blood: x / Protein: Negative / Nitrite: Negative   Leuk Esterase: Negative / RBC: 1 /HPF / WBC 1 /HPF   Sq Epi: x / Non Sq Epi: x / Bacteria: x      I&O's Summary    2023 07:01  -  2023 07:00  --------------------------------------------------------  IN: 0 mL / OUT: 400 mL / NET: -400 mL      Vital Signs Last 24 Hrs  T(C): 36.7 (2023 09:00), Max: 36.8 (2023 14:33)  T(F): 98.1 (2023 09:00), Max: 98.3 (2023 14:33)  HR: 68 (2023 09:00) (56 - 70)  BP: 120/55 (2023 09:00) (120/55 - 139/70)  BP(mean): --  RR: 18 (2023 09:00) (18 - 18)  SpO2: 100% (2023 09:00) (95% - 100%)    Parameters below as of 2023 09:00  Patient On (Oxygen Delivery Method): nasal cannula  O2 Flow (L/min): 2      On Neurological Examination:    Mental Status - Patient is alert, awake, oriented X3. fluent, names, no dysarthria no aphasia Follows commands well and able to answer questions appropriately. 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 Age-appropriate involutional change and   microvascular ischemic disease. No significant interval change from   2020. No large vessel occlusion    --- End of Report ---    < end of copied text >

## 2023-02-24 LAB
ALBUMIN SERPL ELPH-MCNC: 4.2 G/DL — SIGNIFICANT CHANGE UP (ref 3.3–5)
ALP SERPL-CCNC: 81 U/L — SIGNIFICANT CHANGE UP (ref 40–120)
ALT FLD-CCNC: 11 U/L — SIGNIFICANT CHANGE UP (ref 4–33)
ANION GAP SERPL CALC-SCNC: 11 MMOL/L — SIGNIFICANT CHANGE UP (ref 7–14)
AST SERPL-CCNC: 18 U/L — SIGNIFICANT CHANGE UP (ref 4–32)
BASE EXCESS BLDV CALC-SCNC: 17.7 MMOL/L — HIGH (ref -2–3)
BILIRUB SERPL-MCNC: 0.6 MG/DL — SIGNIFICANT CHANGE UP (ref 0.2–1.2)
BUN SERPL-MCNC: 97 MG/DL — HIGH (ref 7–23)
CALCIUM SERPL-MCNC: 10.4 MG/DL — SIGNIFICANT CHANGE UP (ref 8.4–10.5)
CHLORIDE SERPL-SCNC: 89 MMOL/L — LOW (ref 98–107)
CO2 BLDV-SCNC: 47.9 MMOL/L — HIGH (ref 22–26)
CO2 SERPL-SCNC: 38 MMOL/L — HIGH (ref 22–31)
CREAT SERPL-MCNC: 3.09 MG/DL — HIGH (ref 0.5–1.3)
EGFR: 15 ML/MIN/1.73M2 — LOW
GLUCOSE BLDC GLUCOMTR-MCNC: 110 MG/DL — HIGH (ref 70–99)
GLUCOSE BLDC GLUCOMTR-MCNC: 120 MG/DL — HIGH (ref 70–99)
GLUCOSE BLDC GLUCOMTR-MCNC: 125 MG/DL — HIGH (ref 70–99)
GLUCOSE BLDC GLUCOMTR-MCNC: 91 MG/DL — SIGNIFICANT CHANGE UP (ref 70–99)
GLUCOSE SERPL-MCNC: 104 MG/DL — HIGH (ref 70–99)
HCO3 BLDV-SCNC: 46 MMOL/L — CRITICAL HIGH (ref 22–29)
HCT VFR BLD CALC: 28.3 % — LOW (ref 34.5–45)
HGB BLD-MCNC: 8.1 G/DL — LOW (ref 11.5–15.5)
INR BLD: 1.93 RATIO — HIGH (ref 0.88–1.16)
MAGNESIUM SERPL-MCNC: 2.7 MG/DL — HIGH (ref 1.6–2.6)
MCHC RBC-ENTMCNC: 28.6 GM/DL — LOW (ref 32–36)
MCHC RBC-ENTMCNC: 29.7 PG — SIGNIFICANT CHANGE UP (ref 27–34)
MCV RBC AUTO: 103.7 FL — HIGH (ref 80–100)
NRBC # BLD: 0 /100 WBCS — SIGNIFICANT CHANGE UP (ref 0–0)
NRBC # FLD: 0.04 K/UL — HIGH (ref 0–0)
NT-PROBNP SERPL-SCNC: 4609 PG/ML — HIGH
PCO2 BLDV: 69 MMHG — HIGH (ref 39–52)
PH BLDV: 7.43 — SIGNIFICANT CHANGE UP (ref 7.32–7.43)
PHOSPHATE SERPL-MCNC: 3.1 MG/DL — SIGNIFICANT CHANGE UP (ref 2.5–4.5)
PLATELET # BLD AUTO: 264 K/UL — SIGNIFICANT CHANGE UP (ref 150–400)
PO2 BLDV: 77 MMHG — HIGH (ref 25–45)
POTASSIUM SERPL-MCNC: 4 MMOL/L — SIGNIFICANT CHANGE UP (ref 3.5–5.3)
POTASSIUM SERPL-SCNC: 4 MMOL/L — SIGNIFICANT CHANGE UP (ref 3.5–5.3)
PROT SERPL-MCNC: 7.3 G/DL — SIGNIFICANT CHANGE UP (ref 6–8.3)
PROTHROM AB SERPL-ACNC: 22.5 SEC — HIGH (ref 10.5–13.4)
RBC # BLD: 2.73 M/UL — LOW (ref 3.8–5.2)
RBC # FLD: 14.6 % — HIGH (ref 10.3–14.5)
SAO2 % BLDV: 96.4 % — HIGH (ref 67–88)
SODIUM SERPL-SCNC: 138 MMOL/L — SIGNIFICANT CHANGE UP (ref 135–145)
WBC # BLD: 7.78 K/UL — SIGNIFICANT CHANGE UP (ref 3.8–10.5)
WBC # FLD AUTO: 7.78 K/UL — SIGNIFICANT CHANGE UP (ref 3.8–10.5)

## 2023-02-24 PROCEDURE — 99233 SBSQ HOSP IP/OBS HIGH 50: CPT

## 2023-02-24 PROCEDURE — 99232 SBSQ HOSP IP/OBS MODERATE 35: CPT

## 2023-02-24 RX ORDER — WARFARIN SODIUM 2.5 MG/1
5 TABLET ORAL ONCE
Refills: 0 | Status: DISCONTINUED | OUTPATIENT
Start: 2023-02-24 | End: 2023-02-24

## 2023-02-24 RX ORDER — ACETAZOLAMIDE 250 MG/1
500 TABLET ORAL ONCE
Refills: 0 | Status: COMPLETED | OUTPATIENT
Start: 2023-02-24 | End: 2023-02-24

## 2023-02-24 RX ORDER — WARFARIN SODIUM 2.5 MG/1
3 TABLET ORAL ONCE
Refills: 0 | Status: COMPLETED | OUTPATIENT
Start: 2023-02-24 | End: 2023-02-24

## 2023-02-24 RX ADMIN — ISOSORBIDE MONONITRATE 30 MILLIGRAM(S): 60 TABLET, EXTENDED RELEASE ORAL at 12:32

## 2023-02-24 RX ADMIN — Medication 650 MILLIGRAM(S): at 23:07

## 2023-02-24 RX ADMIN — Medication 5 MILLIGRAM(S): at 05:32

## 2023-02-24 RX ADMIN — BUDESONIDE AND FORMOTEROL FUMARATE DIHYDRATE 2 PUFF(S): 160; 4.5 AEROSOL RESPIRATORY (INHALATION) at 22:46

## 2023-02-24 RX ADMIN — Medication 5 UNIT(S): at 17:43

## 2023-02-24 RX ADMIN — BUMETANIDE 5 MG/HR: 0.25 INJECTION INTRAMUSCULAR; INTRAVENOUS at 17:19

## 2023-02-24 RX ADMIN — Medication 325 MILLIGRAM(S): at 12:33

## 2023-02-24 RX ADMIN — Medication 1 APPLICATION(S): at 05:33

## 2023-02-24 RX ADMIN — Medication 50 MILLIGRAM(S): at 13:14

## 2023-02-24 RX ADMIN — Medication 1 APPLICATION(S): at 17:20

## 2023-02-24 RX ADMIN — Medication 30 MILLILITER(S): at 23:07

## 2023-02-24 RX ADMIN — ACETAZOLAMIDE 500 MILLIGRAM(S): 250 TABLET ORAL at 19:42

## 2023-02-24 RX ADMIN — AMIODARONE HYDROCHLORIDE 200 MILLIGRAM(S): 400 TABLET ORAL at 05:33

## 2023-02-24 RX ADMIN — Medication 1 APPLICATION(S): at 12:31

## 2023-02-24 RX ADMIN — Medication 5 UNIT(S): at 09:06

## 2023-02-24 RX ADMIN — SIMVASTATIN 10 MILLIGRAM(S): 20 TABLET, FILM COATED ORAL at 22:47

## 2023-02-24 RX ADMIN — Medication 1 SPRAY(S): at 05:33

## 2023-02-24 RX ADMIN — BUDESONIDE AND FORMOTEROL FUMARATE DIHYDRATE 2 PUFF(S): 160; 4.5 AEROSOL RESPIRATORY (INHALATION) at 09:08

## 2023-02-24 RX ADMIN — Medication 25 UNIT(S): at 23:08

## 2023-02-24 RX ADMIN — Medication 1 SPRAY(S): at 02:36

## 2023-02-24 RX ADMIN — Medication 3 MILLIGRAM(S): at 22:48

## 2023-02-24 RX ADMIN — PANTOPRAZOLE SODIUM 40 MILLIGRAM(S): 20 TABLET, DELAYED RELEASE ORAL at 06:52

## 2023-02-24 RX ADMIN — Medication 1 SPRAY(S): at 12:31

## 2023-02-24 RX ADMIN — Medication 5 UNIT(S): at 12:31

## 2023-02-24 RX ADMIN — Medication 1 SPRAY(S): at 09:07

## 2023-02-24 RX ADMIN — WARFARIN SODIUM 3 MILLIGRAM(S): 2.5 TABLET ORAL at 23:08

## 2023-02-24 RX ADMIN — Medication 50 MILLIGRAM(S): at 05:32

## 2023-02-24 NOTE — PROGRESS NOTE ADULT - PROBLEM SELECTOR PLAN 1
as per pulm , cont bronchodilators  BPAP at night  prednisone  taper 02  diuresis per cardio-   appreciate chf team input  palliative eval

## 2023-02-24 NOTE — PROGRESS NOTE ADULT - SUBJECTIVE AND OBJECTIVE BOX
Interval History:  Patient resting comfortably in bed   Denies CP/SOB/palpitations/dizziness  No acute events overnight      Medications:  acetaminophen     Tablet .. 650 milliGRAM(s) Oral every 6 hours PRN  albuterol    90 MICROgram(s) HFA Inhaler 2 Puff(s) Inhalation every 6 hours PRN  aluminum hydroxide/magnesium hydroxide/simethicone Suspension 30 milliLiter(s) Oral every 4 hours PRN  aMIOdarone    Tablet 200 milliGRAM(s) Oral daily  budesonide 160 MICROgram(s)/formoterol 4.5 MICROgram(s) Inhaler 2 Puff(s) Inhalation two times a day  buMETAnide Infusion 1 mG/Hr IV Continuous <Continuous>  dextrose 5%. 1000 milliLiter(s) IV Continuous <Continuous>  dextrose 5%. 1000 milliLiter(s) IV Continuous <Continuous>  dextrose 50% Injectable 25 Gram(s) IV Push once  dextrose 50% Injectable 12.5 Gram(s) IV Push once  dextrose 50% Injectable 25 Gram(s) IV Push once  dextrose Oral Gel 15 Gram(s) Oral once PRN  ferrous    sulfate 325 milliGRAM(s) Oral daily  glucagon  Injectable 1 milliGRAM(s) IntraMuscular once  hydrALAZINE 50 milliGRAM(s) Oral three times a day  insulin detemir injectable (LEVEMIR) 25 Unit(s) SubCutaneous at bedtime  insulin lispro (ADMELOG) corrective regimen sliding scale   SubCutaneous at bedtime  insulin lispro (ADMELOG) corrective regimen sliding scale   SubCutaneous three times a day before meals  insulin lispro Injectable (ADMELOG) 5 Unit(s) SubCutaneous three times a day before meals  isosorbide   mononitrate ER Tablet (IMDUR) 30 milliGRAM(s) Oral daily  lidocaine   4% Patch 1 Patch Transdermal daily PRN  melatonin 3 milliGRAM(s) Oral at bedtime PRN  ondansetron Injectable 4 milliGRAM(s) IV Push three times a day PRN  pantoprazole    Tablet 40 milliGRAM(s) Oral before breakfast  petrolatum white Ointment 1 Application(s) Topical every 6 hours  predniSONE   Tablet 5 milliGRAM(s) Oral daily  simvastatin 10 milliGRAM(s) Oral at bedtime  sodium chloride 0.65% Nasal 1 Spray(s) Both Nostrils every 4 hours  warfarin 5 milliGRAM(s) Oral once      Vitals:  T(C): 36.4 (23 @ 05:32), Max: 36.8 (23 @ 12:30)  HR: 58 (23 @ 05:32) (58 - 78)  BP: 116/61 (23 @ 05:32) (116/61 - 146/60)  BP(mean): --  RR: 16 (23 @ 05:32) (16 - 19)  SpO2: 99% (23 @ 05:32) (97% - 100%)    Daily     Daily Weight in k.2 (2023 05:32)        I&O's Summary    2023 07:01  -  2023 07:00  --------------------------------------------------------  IN: 410 mL / OUT: 240 mL / NET: 170 mL    2023 07:01  -  2023 10:54  --------------------------------------------------------  IN: 0 mL / OUT: 240 mL / NET: -240 mL        Physical Exam:  Appearance: No Acute Distress  Neck: JVP difficult to assess   Cardiovascular: Normal S1 S2  Respiratory: Clear to auscultation bilaterally  Gastrointestinal: Soft, Non-tender	  Skin: No cyanosis	  Neurologic: Non-focal  Extremities: pitting LE edema  Psychiatry: A & O x 3, Mood & affect appropriate    Labs:                        8.1    7.78  )-----------( 264      ( 2023 06:03 )             28.3     02    138  |  89<L>  |  97<H>  ----------------------------<  104<H>  4.0   |  38<H>  |  3.09<H>    Ca    10.4      2023 06:03  Phos  3.1       Mg     2.70         TPro  7.3  /  Alb  4.2  /  TBili  0.6  /  DBili  x   /  AST  18  /  ALT  11  /  AlkPhos  81      PT/INR - ( 2023 06:03 )   PT: 22.5 sec;   INR: 1.93 ratio           TELEMETRY: AFib    Echocardiogram:  TTE with Doppler (w/Cont) (23 @ 16:17)     OBSERVATIONS:  Mitral Valve: Mechanical prosthetic mitral valve  replacement. Minimal mitral regurgitation. Mean transmitral  valve gradient equals 5 mm Hg, which is probably normal in  the setting of a prosthetic valve.  Aortic Root: Aortic root not well visualized.  Aortic Valve: Aortic valve leaflet morphology not well  visualized. Peak transaortic valve gradient equals 22 mm  Hg, mean transaortic valve gradient equals 11 mm Hg,  consistent with probable mild aortic stenosis.  Left Atrium: Normal left atrium.  Left Ventricle: Endocardium not well visualized; grossly  normal left ventricular systolic function.  Endocardial  visualization enhanced with intravenous injection of echo  contrast (Definity).  Right Heart: Right atrium not well visualized. The right  ventricle is not well visualized. Tricuspid valve not well  visualized. Normal pulmonic valve.  Pericardium/PleuraNormal pericardium with no pericardial  effusion.  ------------------------------------------------------------------------  CONCLUSIONS:  Technically very difficult study.  1. Mechanical prosthetic mitral valve replacement. Minimal  mitral regurgitation. Mean transmitral valve gradient  equals 5 mm Hg, which is probably normal in the setting of  a prosthetic valve.  2. Aortic valve leaflet morphology not well visualized.  Peak transaortic valve gradient equals 22 mm Hg, mean  transaortic valve gradient equals 11 mm Hg, consistent with  probable mild aortic stenosis.  3. Endocardium not well visualized; grossly normal left  ventricular systolic function.  Endocardial visualization  enhanced with intravenous injection of echo contrast  (Definity).  4. The right ventricle is not well visualized.       Interval History:  Patient resting comfortably in bed   Denies CP/SOB/palpitations/dizziness  No acute events overnight      Medications:  acetaminophen     Tablet .. 650 milliGRAM(s) Oral every 6 hours PRN  albuterol    90 MICROgram(s) HFA Inhaler 2 Puff(s) Inhalation every 6 hours PRN  aluminum hydroxide/magnesium hydroxide/simethicone Suspension 30 milliLiter(s) Oral every 4 hours PRN  aMIOdarone    Tablet 200 milliGRAM(s) Oral daily  budesonide 160 MICROgram(s)/formoterol 4.5 MICROgram(s) Inhaler 2 Puff(s) Inhalation two times a day  buMETAnide Infusion 1 mG/Hr IV Continuous <Continuous>  dextrose 5%. 1000 milliLiter(s) IV Continuous <Continuous>  dextrose 5%. 1000 milliLiter(s) IV Continuous <Continuous>  dextrose 50% Injectable 25 Gram(s) IV Push once  dextrose 50% Injectable 12.5 Gram(s) IV Push once  dextrose 50% Injectable 25 Gram(s) IV Push once  dextrose Oral Gel 15 Gram(s) Oral once PRN  ferrous    sulfate 325 milliGRAM(s) Oral daily  glucagon  Injectable 1 milliGRAM(s) IntraMuscular once  hydrALAZINE 50 milliGRAM(s) Oral three times a day  insulin detemir injectable (LEVEMIR) 25 Unit(s) SubCutaneous at bedtime  insulin lispro (ADMELOG) corrective regimen sliding scale   SubCutaneous at bedtime  insulin lispro (ADMELOG) corrective regimen sliding scale   SubCutaneous three times a day before meals  insulin lispro Injectable (ADMELOG) 5 Unit(s) SubCutaneous three times a day before meals  isosorbide   mononitrate ER Tablet (IMDUR) 30 milliGRAM(s) Oral daily  lidocaine   4% Patch 1 Patch Transdermal daily PRN  melatonin 3 milliGRAM(s) Oral at bedtime PRN  ondansetron Injectable 4 milliGRAM(s) IV Push three times a day PRN  pantoprazole    Tablet 40 milliGRAM(s) Oral before breakfast  petrolatum white Ointment 1 Application(s) Topical every 6 hours  predniSONE   Tablet 5 milliGRAM(s) Oral daily  simvastatin 10 milliGRAM(s) Oral at bedtime  sodium chloride 0.65% Nasal 1 Spray(s) Both Nostrils every 4 hours  warfarin 5 milliGRAM(s) Oral once      Vitals:  T(C): 36.4 (23 @ 05:32), Max: 36.8 (23 @ 12:30)  HR: 58 (23 @ 05:32) (58 - 78)  BP: 116/61 (23 @ 05:32) (116/61 - 146/60)  BP(mean): --  RR: 16 (23 @ 05:32) (16 - 19)  SpO2: 99% (23 @ 05:32) (97% - 100%)    Daily     Daily Weight in k.2 (2023 05:32)        I&O's Summary    2023 07:01  -  2023 07:00  --------------------------------------------------------  IN: 410 mL / OUT: 240 mL / NET: 170 mL    2023 07:01  -  2023 10:54  --------------------------------------------------------  IN: 0 mL / OUT: 240 mL / NET: -240 mL        Physical Exam:  Appearance: No Acute Distress  Neck: JVP difficult to assess   Cardiovascular: Normal S1 S2  Respiratory: Clear to auscultation bilaterally  Gastrointestinal: Soft, Non-tender	  Skin: No cyanosis	  Neurologic: Non-focal  Extremities: pitting LE edema  Psychiatry: A & O x 3, Mood & affect appropriate    Labs:                        8.1    7.78  )-----------( 264      ( 2023 06:03 )             28.3     02    138  |  89<L>  |  97<H>  ----------------------------<  104<H>  4.0   |  38<H>  |  3.09<H>    Ca    10.4      2023 06:03  Phos  3.1       Mg     2.70         TPro  7.3  /  Alb  4.2  /  TBili  0.6  /  DBili  x   /  AST  18  /  ALT  11  /  AlkPhos  81      PT/INR - ( 2023 06:03 )   PT: 22.5 sec;   INR: 1.93 ratio       Serum Pro-Brain Natriuretic Peptide: 4609:  (23 @ 06:03)    Serum Pro-Brain Natriuretic Peptide: 2896:  (23 @ 05:30)        TELEMETRY: AFib    Echocardiogram:  TTE with Doppler (w/Cont) (23 @ 16:17)     OBSERVATIONS:  Mitral Valve: Mechanical prosthetic mitral valve  replacement. Minimal mitral regurgitation. Mean transmitral  valve gradient equals 5 mm Hg, which is probably normal in  the setting of a prosthetic valve.  Aortic Root: Aortic root not well visualized.  Aortic Valve: Aortic valve leaflet morphology not well  visualized. Peak transaortic valve gradient equals 22 mm  Hg, mean transaortic valve gradient equals 11 mm Hg,  consistent with probable mild aortic stenosis.  Left Atrium: Normal left atrium.  Left Ventricle: Endocardium not well visualized; grossly  normal left ventricular systolic function.  Endocardial  visualization enhanced with intravenous injection of echo  contrast (Definity).  Right Heart: Right atrium not well visualized. The right  ventricle is not well visualized. Tricuspid valve not well  visualized. Normal pulmonic valve.  Pericardium/PleuraNormal pericardium with no pericardial  effusion.  ------------------------------------------------------------------------  CONCLUSIONS:  Technically very difficult study.  1. Mechanical prosthetic mitral valve replacement. Minimal  mitral regurgitation. Mean transmitral valve gradient  equals 5 mm Hg, which is probably normal in the setting of  a prosthetic valve.  2. Aortic valve leaflet morphology not well visualized.  Peak transaortic valve gradient equals 22 mm Hg, mean  transaortic valve gradient equals 11 mm Hg, consistent with  probable mild aortic stenosis.  3. Endocardium not well visualized; grossly normal left  ventricular systolic function.  Endocardial visualization  enhanced with intravenous injection of echo contrast  (Definity).  4. The right ventricle is not well visualized.       Interval History:  Patient resting comfortably in bed   Denies CP/SOB/palpitations/dizziness  No acute events overnight      Medications:  acetaminophen     Tablet .. 650 milliGRAM(s) Oral every 6 hours PRN  albuterol    90 MICROgram(s) HFA Inhaler 2 Puff(s) Inhalation every 6 hours PRN  aluminum hydroxide/magnesium hydroxide/simethicone Suspension 30 milliLiter(s) Oral every 4 hours PRN  aMIOdarone    Tablet 200 milliGRAM(s) Oral daily  budesonide 160 MICROgram(s)/formoterol 4.5 MICROgram(s) Inhaler 2 Puff(s) Inhalation two times a day  buMETAnide Infusion 1 mG/Hr IV Continuous <Continuous>  dextrose 5%. 1000 milliLiter(s) IV Continuous <Continuous>  dextrose 5%. 1000 milliLiter(s) IV Continuous <Continuous>  dextrose 50% Injectable 25 Gram(s) IV Push once  dextrose 50% Injectable 12.5 Gram(s) IV Push once  dextrose 50% Injectable 25 Gram(s) IV Push once  dextrose Oral Gel 15 Gram(s) Oral once PRN  ferrous    sulfate 325 milliGRAM(s) Oral daily  glucagon  Injectable 1 milliGRAM(s) IntraMuscular once  hydrALAZINE 50 milliGRAM(s) Oral three times a day  insulin detemir injectable (LEVEMIR) 25 Unit(s) SubCutaneous at bedtime  insulin lispro (ADMELOG) corrective regimen sliding scale   SubCutaneous at bedtime  insulin lispro (ADMELOG) corrective regimen sliding scale   SubCutaneous three times a day before meals  insulin lispro Injectable (ADMELOG) 5 Unit(s) SubCutaneous three times a day before meals  isosorbide   mononitrate ER Tablet (IMDUR) 30 milliGRAM(s) Oral daily  lidocaine   4% Patch 1 Patch Transdermal daily PRN  melatonin 3 milliGRAM(s) Oral at bedtime PRN  ondansetron Injectable 4 milliGRAM(s) IV Push three times a day PRN  pantoprazole    Tablet 40 milliGRAM(s) Oral before breakfast  petrolatum white Ointment 1 Application(s) Topical every 6 hours  predniSONE   Tablet 5 milliGRAM(s) Oral daily  simvastatin 10 milliGRAM(s) Oral at bedtime  sodium chloride 0.65% Nasal 1 Spray(s) Both Nostrils every 4 hours  warfarin 5 milliGRAM(s) Oral once      Vitals:  T(C): 36.4 (23 @ 05:32), Max: 36.8 (23 @ 12:30)  HR: 58 (23 @ 05:32) (58 - 78)  BP: 116/61 (23 @ 05:32) (116/61 - 146/60)  BP(mean): --  RR: 16 (23 @ 05:32) (16 - 19)  SpO2: 99% (23 @ 05:32) (97% - 100%)    Daily     Daily Weight in k.2 (2023 05:32)        I&O's Summary    2023 07:01  -  2023 07:00  --------------------------------------------------------  IN: 410 mL / OUT: 240 mL / NET: 170 mL    2023 07:01  -  2023 10:54  --------------------------------------------------------  IN: 0 mL / OUT: 240 mL / NET: -240 mL        Physical Exam:  Appearance: No Acute Distress  Neck: JVP difficult to assess   Cardiovascular: Normal S1 S2  Respiratory: Clear to auscultation bilaterally  Gastrointestinal: Soft, Non-tender	  Skin: No cyanosis	  Neurologic: Non-focal  Extremities: 1+ BLE edema  Psychiatry: A & O x 3, Mood & affect appropriate    Labs:                        8.1    7.78  )-----------( 264      ( 2023 06:03 )             28.3     02    138  |  89<L>  |  97<H>  ----------------------------<  104<H>  4.0   |  38<H>  |  3.09<H>    Ca    10.4      2023 06:03  Phos  3.1       Mg     2.70         TPro  7.3  /  Alb  4.2  /  TBili  0.6  /  DBili  x   /  AST  18  /  ALT  11  /  AlkPhos  81      PT/INR - ( 2023 06:03 )   PT: 22.5 sec;   INR: 1.93 ratio       Serum Pro-Brain Natriuretic Peptide: 4609:  (23 @ 06:03)    Serum Pro-Brain Natriuretic Peptide: 2896:  (23 @ 05:30)        TELEMETRY: AFib    Echocardiogram:  TTE with Doppler (w/Cont) (23 @ 16:17)     OBSERVATIONS:  Mitral Valve: Mechanical prosthetic mitral valve  replacement. Minimal mitral regurgitation. Mean transmitral  valve gradient equals 5 mm Hg, which is probably normal in  the setting of a prosthetic valve.  Aortic Root: Aortic root not well visualized.  Aortic Valve: Aortic valve leaflet morphology not well  visualized. Peak transaortic valve gradient equals 22 mm  Hg, mean transaortic valve gradient equals 11 mm Hg,  consistent with probable mild aortic stenosis.  Left Atrium: Normal left atrium.  Left Ventricle: Endocardium not well visualized; grossly  normal left ventricular systolic function.  Endocardial  visualization enhanced with intravenous injection of echo  contrast (Definity).  Right Heart: Right atrium not well visualized. The right  ventricle is not well visualized. Tricuspid valve not well  visualized. Normal pulmonic valve.  Pericardium/PleuraNormal pericardium with no pericardial  effusion.  ------------------------------------------------------------------------  CONCLUSIONS:  Technically very difficult study.  1. Mechanical prosthetic mitral valve replacement. Minimal  mitral regurgitation. Mean transmitral valve gradient  equals 5 mm Hg, which is probably normal in the setting of  a prosthetic valve.  2. Aortic valve leaflet morphology not well visualized.  Peak transaortic valve gradient equals 22 mm Hg, mean  transaortic valve gradient equals 11 mm Hg, consistent with  probable mild aortic stenosis.  3. Endocardium not well visualized; grossly normal left  ventricular systolic function.  Endocardial visualization  enhanced with intravenous injection of echo contrast  (Definity).  4. The right ventricle is not well visualized.

## 2023-02-24 NOTE — PROGRESS NOTE ADULT - PROBLEM SELECTOR PLAN 1
Bumex 1mg/hr   Hydralazine 50mg TID  Imdur 30mg daily   Strict I/O  Daily standing weights  Monitor lytes replete K>4.0 and Mg>2.0   Trend SCr (3.90 yesterday 2.83)  Repeat BNP 4609 (2869 on 2/16)  Management per primary team   Appreciate Nephrology recommendations (workup for L. renal mass)  HF counseling  provided and GOC discussion initiated   Pending final recommendations from HF attending. Bumex 1mg/hr   Hydralazine 50mg TID  Imdur 30mg daily   Strict I/O  Daily standing weights  Monitor lytes replete K>4.0 and Mg>2.0   Trend SCr (3.90 yesterday 2.83)  Repeat BNP 4609 (2869 on 2/16)  Management per primary team   Appreciate Nephrology recommendations (workup for L. renal mass)  HF counseling  provided and GOC discussion with daughter today (agreed with DNR/DNI status)   Pending final recommendations from HF attending

## 2023-02-24 NOTE — PROGRESS NOTE ADULT - SUBJECTIVE AND OBJECTIVE BOX
SUBJECTIVE / OVERNIGHT EVENTS:pt seen and examined, c/o shortness of breath   23    MEDICATIONS  (STANDING):  aMIOdarone    Tablet 200 milliGRAM(s) Oral daily  budesonide 160 MICROgram(s)/formoterol 4.5 MICROgram(s) Inhaler 2 Puff(s) Inhalation two times a day  buMETAnide Infusion 1 mG/Hr (5 mL/Hr) IV Continuous <Continuous>  dextrose 5%. 1000 milliLiter(s) (50 mL/Hr) IV Continuous <Continuous>  dextrose 5%. 1000 milliLiter(s) (100 mL/Hr) IV Continuous <Continuous>  dextrose 50% Injectable 25 Gram(s) IV Push once  dextrose 50% Injectable 12.5 Gram(s) IV Push once  dextrose 50% Injectable 25 Gram(s) IV Push once  ferrous    sulfate 325 milliGRAM(s) Oral daily  glucagon  Injectable 1 milliGRAM(s) IntraMuscular once  hydrALAZINE 50 milliGRAM(s) Oral three times a day  insulin detemir injectable (LEVEMIR) 25 Unit(s) SubCutaneous at bedtime  insulin lispro (ADMELOG) corrective regimen sliding scale   SubCutaneous three times a day before meals  insulin lispro (ADMELOG) corrective regimen sliding scale   SubCutaneous at bedtime  insulin lispro Injectable (ADMELOG) 5 Unit(s) SubCutaneous three times a day before meals  isosorbide   mononitrate ER Tablet (IMDUR) 30 milliGRAM(s) Oral daily  pantoprazole    Tablet 40 milliGRAM(s) Oral before breakfast  petrolatum white Ointment 1 Application(s) Topical every 6 hours  predniSONE   Tablet 5 milliGRAM(s) Oral daily  simvastatin 10 milliGRAM(s) Oral at bedtime  sodium chloride 0.65% Nasal 1 Spray(s) Both Nostrils every 4 hours    MEDICATIONS  (PRN):  acetaminophen     Tablet .. 650 milliGRAM(s) Oral every 6 hours PRN Temp greater or equal to 38C (100.4F), Mild Pain (1 - 3)  albuterol    90 MICROgram(s) HFA Inhaler 2 Puff(s) Inhalation every 6 hours PRN Bronchospasm  aluminum hydroxide/magnesium hydroxide/simethicone Suspension 30 milliLiter(s) Oral every 4 hours PRN Dyspepsia  dextrose Oral Gel 15 Gram(s) Oral once PRN Blood Glucose LESS THAN 70 milliGRAM(s)/deciliter  lidocaine   4% Patch 1 Patch Transdermal daily PRN pain  melatonin 3 milliGRAM(s) Oral at bedtime PRN Insomnia  ondansetron Injectable 4 milliGRAM(s) IV Push three times a day PRN Nausea and/or Vomiting    Vital Signs Last 24 Hrs  T(C): 36.5 (23 @ 17:31), Max: 36.9 (23 @ 12:11)  T(F): 97.7 (23 @ 17:31), Max: 98.4 (23 @ 12:11)  HR: 60 (23 @ 17:31) (58 - 78)  BP: 112/45 (23 @ 17:31) (112/45 - 135/64)  BP(mean): --  RR: 18 (23 @ 17:31) (16 - 19)  SpO2: 100% (23 @ 17:31) (97% - 100%)      Constitutional: No fever, fatigue  Skin: No rash.  Eyes: No recent vision problems or eye pain.  ENT: No congestion, ear pain, or sore throat.  Cardiovascular: No chest pain or palpation.  Respiratory: sob+  Gastrointestinal: No abdominal pain, nausea, vomiting, or diarrhea.  Genitourinary: No dysuria.  Musculoskeletal: No joint swelling.  Neurologic: No headache.    PHYSICAL EXAM:  GENERAL: NAD  EYES: EOMI, PERRLA  NECK: Supple, No JVD  CHEST/LUNG: dec breath sounds at bases  HEART:  S1 , S2 +  ABDOMEN: soft, bs+  EXTREMITIES:  edema+  NEUROLOGY:alert awake oriented    LABS:      138  |  89<L>  |  97<H>  ----------------------------<  104<H>  4.0   |  38<H>  |  3.09<H>    Ca    10.4      2023 06:03  Phos  3.1       Mg     2.70         TPro  7.3  /  Alb  4.2  /  TBili  0.6  /  DBili      /  AST  18  /  ALT  11  /  AlkPhos  81      Creatinine Trend: 3.09 <--, 2.83 <--, 3.19 <--, 3.13 <--, 2.94 <--, 2.55 <--, 2.40 <--                        8.1    7.78  )-----------( 264      ( 2023 06:03 )             28.3     Urine Studies:  Urinalysis Basic - ( 2023 18:14 )    Color: Yellow / Appearance: Clear / S.014 / pH:   Gluc:  / Ketone: Negative  / Bili: Negative / Urobili: <2 mg/dL   Blood:  / Protein: Negative / Nitrite: Negative   Leuk Esterase: Negative / RBC: 1 /HPF / WBC 1 /HPF   Sq Epi:  / Non Sq Epi:  / Bacteria:       Creatinine, Random Urine: 101 mg/dL ( @ 18:14)          LIVER FUNCTIONS - ( 2023 06:03 )  Alb: 4.2 g/dL / Pro: 7.3 g/dL / ALK PHOS: 81 U/L / ALT: 11 U/L / AST: 18 U/L / GGT: x           PT/INR - ( 2023 06:03 )   PT: 22.5 sec;   INR: 1.93 ratio                  Imaging Personally Reviewed:yes    Consultant(s) Notes Reviewed:  yes    Care Discussed with Consultants/Other Providers:yes

## 2023-02-24 NOTE — PROGRESS NOTE ADULT - SUBJECTIVE AND OBJECTIVE BOX
PULMONARY PROGRESS NOTE    DAMARI GUERRERO  MRN-[id number]    Patient is a 72y old  Female who presents with a chief complaint of Epistaxis (24 Feb 2023 12:40)      HPI:  -remains on 3L  w/o epistaxis  abdominal pain better  no resp complaints   -    ROS:   -    ACTIVE MEDICATION LIST:  MEDICATIONS  (STANDING):  acetaZOLAMIDE Injectable 500 milliGRAM(s) IV Push once  aMIOdarone    Tablet 200 milliGRAM(s) Oral daily  budesonide 160 MICROgram(s)/formoterol 4.5 MICROgram(s) Inhaler 2 Puff(s) Inhalation two times a day  buMETAnide Infusion 1 mG/Hr (5 mL/Hr) IV Continuous <Continuous>  dextrose 5%. 1000 milliLiter(s) (50 mL/Hr) IV Continuous <Continuous>  dextrose 5%. 1000 milliLiter(s) (100 mL/Hr) IV Continuous <Continuous>  dextrose 50% Injectable 25 Gram(s) IV Push once  dextrose 50% Injectable 12.5 Gram(s) IV Push once  dextrose 50% Injectable 25 Gram(s) IV Push once  ferrous    sulfate 325 milliGRAM(s) Oral daily  glucagon  Injectable 1 milliGRAM(s) IntraMuscular once  hydrALAZINE 50 milliGRAM(s) Oral three times a day  insulin detemir injectable (LEVEMIR) 25 Unit(s) SubCutaneous at bedtime  insulin lispro (ADMELOG) corrective regimen sliding scale   SubCutaneous three times a day before meals  insulin lispro (ADMELOG) corrective regimen sliding scale   SubCutaneous at bedtime  insulin lispro Injectable (ADMELOG) 5 Unit(s) SubCutaneous three times a day before meals  isosorbide   mononitrate ER Tablet (IMDUR) 30 milliGRAM(s) Oral daily  pantoprazole    Tablet 40 milliGRAM(s) Oral before breakfast  petrolatum white Ointment 1 Application(s) Topical every 6 hours  predniSONE   Tablet 5 milliGRAM(s) Oral daily  simvastatin 10 milliGRAM(s) Oral at bedtime  sodium chloride 0.65% Nasal 1 Spray(s) Both Nostrils every 4 hours  warfarin 5 milliGRAM(s) Oral once    MEDICATIONS  (PRN):  acetaminophen     Tablet .. 650 milliGRAM(s) Oral every 6 hours PRN Temp greater or equal to 38C (100.4F), Mild Pain (1 - 3)  albuterol    90 MICROgram(s) HFA Inhaler 2 Puff(s) Inhalation every 6 hours PRN Bronchospasm  aluminum hydroxide/magnesium hydroxide/simethicone Suspension 30 milliLiter(s) Oral every 4 hours PRN Dyspepsia  dextrose Oral Gel 15 Gram(s) Oral once PRN Blood Glucose LESS THAN 70 milliGRAM(s)/deciliter  lidocaine   4% Patch 1 Patch Transdermal daily PRN pain  melatonin 3 milliGRAM(s) Oral at bedtime PRN Insomnia  ondansetron Injectable 4 milliGRAM(s) IV Push three times a day PRN Nausea and/or Vomiting      EXAM:  Vital Signs Last 24 Hrs  T(C): 36.9 (24 Feb 2023 12:11), Max: 36.9 (24 Feb 2023 12:11)  T(F): 98.4 (24 Feb 2023 12:11), Max: 98.4 (24 Feb 2023 12:11)  HR: 63 (24 Feb 2023 12:11) (58 - 78)  BP: 135/64 (24 Feb 2023 12:11) (116/61 - 135/64)  BP(mean): --  RR: 17 (24 Feb 2023 12:11) (16 - 19)  SpO2: 98% (24 Feb 2023 12:11) (97% - 99%)    Parameters below as of 24 Feb 2023 12:11  Patient On (Oxygen Delivery Method): nasal cannula  O2 Flow (L/min): 3      GENERAL: The patient is awake and alert in no apparent distress.     LUNGS: Clear to auscultation without wheezing, rales or rhonchi; respirations unlabored                             8.1    7.78  )-----------( 264      ( 24 Feb 2023 06:03 )             28.3       02-24    138  |  89<L>  |  97<H>  ----------------------------<  104<H>  4.0   |  38<H>  |  3.09<H>    Ca    10.4      24 Feb 2023 06:03  Phos  3.1     02-24  Mg     2.70     02-24    TPro  7.3  /  Alb  4.2  /  TBili  0.6  /  DBili  x   /  AST  18  /  ALT  11  /  AlkPhos  81  02-24     < from: CT Chest No Cont (02.22.23 @ 09:24) >    ACC: [id number] EXAM:  CT ABDOMEN AND PELVIS   ORDERED BY: CRALINE CAIN     ACC: [id number] EXAM:  CT CHEST   ORDERED BY: CARLIEN CAIN     PROCEDURE DATE:  02/22/2023          INTERPRETATION:  CLINICAL INFORMATION: Cough with abdominal pain evaluate   for acute process.    COMPARISON: CT chest 1/19/2023 and January 8, 2022, CT abdomen pelvis   10/22/2022. Correlation is made to renal ultrasound from April 6, 2021.    CONTRAST/COMPLICATIONS:  IV Contrast: NONE  Oral Contrast: NONE  Complications: None reported at time of study completion    PROCEDURE:  CT of the Chest, Abdomen and Pelvis was performed.  Sagittal and coronal reformats were performed.    FINDINGS:  CHEST:  Exam limited by respiratory motion.  LUNGS AND LARGE AIRWAYS:Diffuse bilateral groundglass opacities, left   than right, with interlobular septal thickening. Similar-appearing left   upper lobe atelectasis versus scarring. Trace left basilar atelectasis.   0.5 cm right apex nodule (2, 12), unchanged since January 8, 2022.  PLEURA: No pleural effusion.  VESSELS: Coronary artery and aortic calcifications.  HEART: Cardiomegaly. No pericardial effusion. Mitral and tricuspid   valvular replacements.  MEDIASTINUM AND CARMELA: Calcified mediastinal lymph nodes.  CHEST WALL AND LOWER NECK: Status post prior median sternotomy.   Nonspecific surgical clips in the right supraclavicular region.    ABDOMEN AND PELVIS:  Full examination of the intra-abdominal viscera and vasculature is   limited without the addition ofIV contrast.    LIVER: Cirrhosis.  BILE DUCTS: Normal caliber.  GALLBLADDER: Cholelithiasis.  SPLEEN: Within normal limits.  PANCREAS: Within normal limits.  ADRENALS: Within normal limits.  KIDNEYS/URETERS: Similar-appearing 3.2 cm left upper pole renal mass when   compared to CT chest from 1/19/2023 not well evaluated on this   noncontrast scan.    BLADDER: Within normal limits.  REPRODUCTIVE ORGANS: Uterus and adnexa within normal limits.    BOWEL: Colonic diverticulosis without diverticulitis. No bowel   obstruction. Appendix is normal.  PERITONEUM: No ascites. No free air.  VESSELS: Atherosclerotic changes.  RETROPERITONEUM/LYMPH NODES: No lymphadenopathy.  ABDOMINAL WALL: Small fat-containing umbilical hernia.  BONES: Degenerative changes.    IMPRESSION:  Exam limited by respiratory motion and lack of IV contrast.    Pulmonary edema left greater than right.    Similar-appearing 3.2 cm left upper pole renal mass not well evaluated on   this noncontrast scan, but shown to contain internal vascularity on renal   ultrasound from April 6, 2021, suspicious for renal neoplasm. Further   evaluation with contrast-enhanced CT or MRI abdomen (renal mass protocol)   is recommended.    --- End of Report ---          RADHA TY MD; Resident Radiologist  This document has been electronically signed.  ZOE OROZCO MD; Attending Radiologist  This document has been electronically signed. Feb 22 2023 11:44AM    < end of copied text >      PROBLEM LIST:  72y Female with HEALTH ISSUES - PROBLEM Dx:  Epistaxis    Chronic atrial fibrillation    COPD, severe    CHF (congestive heart failure)    History of mitral valve replacement with mechanical valve    Hypertension    Need for prophylactic measure    Type 2 diabetes mellitus              RECS:  diuresis per renal/ HF team  continue prednisone 5mg   NC 3L baseline  should continue to be on bpap qhs  continue bronchodilators  oob/chair    Please call with any questions over the weekend.    Irma Eaton,   [facility] Pulmonary/Sleep Medicine  [phone]

## 2023-02-24 NOTE — PROGRESS NOTE ADULT - SUBJECTIVE AND OBJECTIVE BOX
[facility] NEPHROLOGY- PROGRESS NOTE    72 year old Female with history of COPD, CHF presents with weakness. Nephrology consulted for elevated Scr.    REVIEW OF SYSTEMS:  Gen: no fevers  Cards: no chest pain  Resp: + dyspnea improving  GI: no nausea or vomiting or diarrhea, + abdominal pain  Vascular: + LE edema    No Known Allergies      Hospital Medications: Medications reviewed      VITALS:  T(F): 98.4 (23 @ 12:11), Max: 98.4 (23 @ 12:11)  HR: 63 (23 @ 12:11)  BP: 135/64 (23 @ 12:11)  RR: 17 (23 @ 12:11)  SpO2: 98% (23 @ 12:11)  Wt(kg): --     @ 07:01  -   @ 07:00  --------------------------------------------------------  IN: 410 mL / OUT: 240 mL / NET: 170 mL     @ 07:01  -   @ 12:41  --------------------------------------------------------  IN: 0 mL / OUT: 240 mL / NET: -240 mL        PHYSICAL EXAM:    Gen: NAD, calm  Cards: RRR, +S1/S2, no M/G/R  Resp: CTA B/L  GI: soft, + TTP in epigastric area, ND, NABS  Vascular: 1+ LE edema B/L      LABS:      138  |  89<L>  |  97<H>  ----------------------------<  104<H>  4.0   |  38<H>  |  3.09<H>    Ca    10.4      2023 06:03  Phos  3.1       Mg     2.70         TPro  7.3  /  Alb  4.2  /  TBili  0.6  /  DBili      /  AST  18  /  ALT  11  /  AlkPhos  81      Creatinine Trend: 3.09 <--, 2.83 <--, 3.19 <--, 3.13 <--, 2.94 <--, 2.55 <--, 2.40 <--                        8.1    7.78  )-----------( 264      ( 2023 06:03 )             28.3     Urine Studies:  Urinalysis Basic - ( 2023 18:14 )    Color: Yellow / Appearance: Clear / S.014 / pH:   Gluc:  / Ketone: Negative  / Bili: Negative / Urobili: <2 mg/dL   Blood:  / Protein: Negative / Nitrite: Negative   Leuk Esterase: Negative / RBC: 1 /HPF / WBC 1 /HPF   Sq Epi:  / Non Sq Epi:  / Bacteria:       Creatinine, Random Urine: 101 mg/dL ( @ 18:14)

## 2023-02-25 LAB
ANION GAP SERPL CALC-SCNC: 11 MMOL/L — SIGNIFICANT CHANGE UP (ref 7–14)
APTT BLD: 37.4 SEC — HIGH (ref 27–36.3)
BUN SERPL-MCNC: 101 MG/DL — HIGH (ref 7–23)
CALCIUM SERPL-MCNC: 10.5 MG/DL — SIGNIFICANT CHANGE UP (ref 8.4–10.5)
CHLORIDE SERPL-SCNC: 87 MMOL/L — LOW (ref 98–107)
CO2 SERPL-SCNC: 42 MMOL/L — HIGH (ref 22–31)
CREAT SERPL-MCNC: 3.41 MG/DL — HIGH (ref 0.5–1.3)
EGFR: 14 ML/MIN/1.73M2 — LOW
GLUCOSE BLDC GLUCOMTR-MCNC: 102 MG/DL — HIGH (ref 70–99)
GLUCOSE BLDC GLUCOMTR-MCNC: 112 MG/DL — HIGH (ref 70–99)
GLUCOSE BLDC GLUCOMTR-MCNC: 129 MG/DL — HIGH (ref 70–99)
GLUCOSE BLDC GLUCOMTR-MCNC: 138 MG/DL — HIGH (ref 70–99)
GLUCOSE BLDC GLUCOMTR-MCNC: 85 MG/DL — SIGNIFICANT CHANGE UP (ref 70–99)
GLUCOSE SERPL-MCNC: 83 MG/DL — SIGNIFICANT CHANGE UP (ref 70–99)
HCT VFR BLD CALC: 30.9 % — LOW (ref 34.5–45)
HGB BLD-MCNC: 8.9 G/DL — LOW (ref 11.5–15.5)
INR BLD: 1.44 RATIO — HIGH (ref 0.88–1.16)
MCHC RBC-ENTMCNC: 28.8 GM/DL — LOW (ref 32–36)
MCHC RBC-ENTMCNC: 29.4 PG — SIGNIFICANT CHANGE UP (ref 27–34)
MCV RBC AUTO: 102 FL — HIGH (ref 80–100)
NRBC # BLD: 0 /100 WBCS — SIGNIFICANT CHANGE UP (ref 0–0)
NRBC # FLD: 0 K/UL — SIGNIFICANT CHANGE UP (ref 0–0)
PLATELET # BLD AUTO: 276 K/UL — SIGNIFICANT CHANGE UP (ref 150–400)
POTASSIUM SERPL-MCNC: 3.9 MMOL/L — SIGNIFICANT CHANGE UP (ref 3.5–5.3)
POTASSIUM SERPL-SCNC: 3.9 MMOL/L — SIGNIFICANT CHANGE UP (ref 3.5–5.3)
PROTHROM AB SERPL-ACNC: 16.8 SEC — HIGH (ref 10.5–13.4)
RBC # BLD: 3.03 M/UL — LOW (ref 3.8–5.2)
RBC # FLD: 14.6 % — HIGH (ref 10.3–14.5)
SODIUM SERPL-SCNC: 140 MMOL/L — SIGNIFICANT CHANGE UP (ref 135–145)
WBC # BLD: 8.51 K/UL — SIGNIFICANT CHANGE UP (ref 3.8–10.5)
WBC # FLD AUTO: 8.51 K/UL — SIGNIFICANT CHANGE UP (ref 3.8–10.5)

## 2023-02-25 RX ORDER — WARFARIN SODIUM 2.5 MG/1
3 TABLET ORAL ONCE
Refills: 0 | Status: COMPLETED | OUTPATIENT
Start: 2023-02-25 | End: 2023-02-25

## 2023-02-25 RX ADMIN — WARFARIN SODIUM 3 MILLIGRAM(S): 2.5 TABLET ORAL at 23:04

## 2023-02-25 RX ADMIN — Medication 5 UNIT(S): at 12:16

## 2023-02-25 RX ADMIN — PANTOPRAZOLE SODIUM 40 MILLIGRAM(S): 20 TABLET, DELAYED RELEASE ORAL at 05:40

## 2023-02-25 RX ADMIN — Medication 25 UNIT(S): at 23:04

## 2023-02-25 RX ADMIN — BUDESONIDE AND FORMOTEROL FUMARATE DIHYDRATE 2 PUFF(S): 160; 4.5 AEROSOL RESPIRATORY (INHALATION) at 08:30

## 2023-02-25 RX ADMIN — Medication 50 MILLIGRAM(S): at 13:58

## 2023-02-25 RX ADMIN — AMIODARONE HYDROCHLORIDE 200 MILLIGRAM(S): 400 TABLET ORAL at 05:40

## 2023-02-25 RX ADMIN — ISOSORBIDE MONONITRATE 30 MILLIGRAM(S): 60 TABLET, EXTENDED RELEASE ORAL at 12:52

## 2023-02-25 RX ADMIN — Medication 5 MILLIGRAM(S): at 05:40

## 2023-02-25 RX ADMIN — Medication 5 UNIT(S): at 08:30

## 2023-02-25 RX ADMIN — Medication 1 APPLICATION(S): at 12:16

## 2023-02-25 RX ADMIN — Medication 1 SPRAY(S): at 08:30

## 2023-02-25 RX ADMIN — Medication 50 MILLIGRAM(S): at 23:05

## 2023-02-25 RX ADMIN — Medication 1 APPLICATION(S): at 00:01

## 2023-02-25 RX ADMIN — Medication 50 MILLIGRAM(S): at 05:40

## 2023-02-25 RX ADMIN — SIMVASTATIN 10 MILLIGRAM(S): 20 TABLET, FILM COATED ORAL at 23:04

## 2023-02-25 RX ADMIN — Medication 325 MILLIGRAM(S): at 12:52

## 2023-02-25 RX ADMIN — Medication 5 UNIT(S): at 17:53

## 2023-02-25 RX ADMIN — BUDESONIDE AND FORMOTEROL FUMARATE DIHYDRATE 2 PUFF(S): 160; 4.5 AEROSOL RESPIRATORY (INHALATION) at 23:05

## 2023-02-25 RX ADMIN — Medication 650 MILLIGRAM(S): at 00:02

## 2023-02-25 NOTE — PROGRESS NOTE ADULT - SUBJECTIVE AND OBJECTIVE BOX
SUBJECTIVE / OVERNIGHT EVENTS:pt seen and examined, c/o shortness of breath   23    MEDICATIONS  (STANDING):  aMIOdarone    Tablet 200 milliGRAM(s) Oral daily  budesonide 160 MICROgram(s)/formoterol 4.5 MICROgram(s) Inhaler 2 Puff(s) Inhalation two times a day  dextrose 5%. 1000 milliLiter(s) (50 mL/Hr) IV Continuous <Continuous>  dextrose 5%. 1000 milliLiter(s) (100 mL/Hr) IV Continuous <Continuous>  dextrose 50% Injectable 25 Gram(s) IV Push once  dextrose 50% Injectable 12.5 Gram(s) IV Push once  dextrose 50% Injectable 25 Gram(s) IV Push once  ferrous    sulfate 325 milliGRAM(s) Oral daily  glucagon  Injectable 1 milliGRAM(s) IntraMuscular once  hydrALAZINE 50 milliGRAM(s) Oral three times a day  insulin detemir injectable (LEVEMIR) 25 Unit(s) SubCutaneous at bedtime  insulin lispro (ADMELOG) corrective regimen sliding scale   SubCutaneous three times a day before meals  insulin lispro (ADMELOG) corrective regimen sliding scale   SubCutaneous at bedtime  insulin lispro Injectable (ADMELOG) 5 Unit(s) SubCutaneous three times a day before meals  isosorbide   mononitrate ER Tablet (IMDUR) 30 milliGRAM(s) Oral daily  pantoprazole    Tablet 40 milliGRAM(s) Oral before breakfast  petrolatum white Ointment 1 Application(s) Topical every 6 hours  predniSONE   Tablet 5 milliGRAM(s) Oral daily  simvastatin 10 milliGRAM(s) Oral at bedtime  sodium chloride 0.65% Nasal 1 Spray(s) Both Nostrils every 4 hours  warfarin 3 milliGRAM(s) Oral once    MEDICATIONS  (PRN):  acetaminophen     Tablet .. 650 milliGRAM(s) Oral every 6 hours PRN Temp greater or equal to 38C (100.4F), Mild Pain (1 - 3)  albuterol    90 MICROgram(s) HFA Inhaler 2 Puff(s) Inhalation every 6 hours PRN Bronchospasm  aluminum hydroxide/magnesium hydroxide/simethicone Suspension 30 milliLiter(s) Oral every 4 hours PRN Dyspepsia  dextrose Oral Gel 15 Gram(s) Oral once PRN Blood Glucose LESS THAN 70 milliGRAM(s)/deciliter  lidocaine   4% Patch 1 Patch Transdermal daily PRN pain  melatonin 3 milliGRAM(s) Oral at bedtime PRN Insomnia  ondansetron Injectable 4 milliGRAM(s) IV Push three times a day PRN Nausea and/or Vomiting    Vital Signs Last 24 Hrs  T(C): 36.6 (23 @ 17:30), Max: 36.8 (23 @ 12:49)  T(F): 97.8 (23 @ 17:30), Max: 98.2 (23 @ 12:49)  HR: 64 (23 @ 17:30) (58 - 64)  BP: 115/48 (23 @ 17:30) (106/52 - 134/50)  BP(mean): --  RR: 18 (23 @ 17:30) (17 - 18)  SpO2: 100% (23 @ 17:30) (93% - 100%)      Constitutional: No fever, fatigue  Skin: No rash.  Eyes: No recent vision problems or eye pain.  ENT: No congestion, ear pain, or sore throat.  Cardiovascular: No chest pain or palpation.  Respiratory: sob+  Gastrointestinal: No abdominal pain, nausea, vomiting, or diarrhea.  Genitourinary: No dysuria.  Musculoskeletal: No joint swelling.  Neurologic: No headache.    PHYSICAL EXAM:  GENERAL: NAD  EYES: EOMI, PERRLA  NECK: Supple, No JVD  CHEST/LUNG: dec breath sounds at bases  HEART:  S1 , S2 +  ABDOMEN: soft, bs+  EXTREMITIES:  edema+  NEUROLOGY:alert awake oriented    LABS:      140  |  87<L>  |  101<H>  ----------------------------<  83  3.9   |  42<H>  |  3.41<H>    Ca    10.5      2023 07:54  Phos  3.1       Mg     2.70         TPro  7.3  /  Alb  4.2  /  TBili  0.6  /  DBili      /  AST  18  /  ALT  11  /  AlkPhos  81      Creatinine Trend: 3.41 <--, 3.09 <--, 2.83 <--, 3.19 <--, 3.13 <--, 2.94 <--, 2.55 <--                        8.9    8.51  )-----------( 276      ( 2023 07:54 )             30.9     Urine Studies:  Urinalysis Basic - ( 2023 18:14 )    Color: Yellow / Appearance: Clear / S.014 / pH:   Gluc:  / Ketone: Negative  / Bili: Negative / Urobili: <2 mg/dL   Blood:  / Protein: Negative / Nitrite: Negative   Leuk Esterase: Negative / RBC: 1 /HPF / WBC 1 /HPF   Sq Epi:  / Non Sq Epi:  / Bacteria:       Creatinine, Random Urine: 101 mg/dL ( @ 18:14)          LIVER FUNCTIONS - ( 2023 06:03 )  Alb: 4.2 g/dL / Pro: 7.3 g/dL / ALK PHOS: 81 U/L / ALT: 11 U/L / AST: 18 U/L / GGT: x           PT/INR - ( 2023 07:54 )   PT: 16.8 sec;   INR: 1.44 ratio         PTT - ( 2023 07:54 )  PTT:37.4 sec              Imaging Personally Reviewed:yes    Consultant(s) Notes Reviewed:  yes    Care Discussed with Consultants/Other Providers:yes

## 2023-02-25 NOTE — PROGRESS NOTE ADULT - SUBJECTIVE AND OBJECTIVE BOX
[facility] NEPHROLOGY- PROGRESS NOTE    72 year old Female with history of COPD, CHF presents with weakness. Nephrology consulted for elevated Scr.    REVIEW OF SYSTEMS:  Gen: no fevers  Cards: no chest pain  Resp: + dyspnea improving  GI: no nausea or vomiting or diarrhea  Vascular: + LE edema improving    No Known Allergies      Hospital Medications: Medications reviewed      VITALS:  T(F): 97.6 (23 @ 05:40), Max: 98.4 (23 @ 12:11)  HR: 62 (23 @ 05:40)  BP: 134/50 (23 @ 05:40)  RR: 17 (23 @ 05:40)  SpO2: 93% (23 @ 05:40)  Wt(kg): --     @ 07:01  -   @ 07:00  --------------------------------------------------------  IN: 260 mL / OUT: 540 mL / NET: -280 mL        PHYSICAL EXAM:    Gen: NAD, calm  Cards: RRR, +S1/S2, no M/G/R  Resp: CTA B/L  GI: soft, + TTP in epigastric area, ND, NABS  Vascular: trace LE edema B/L      LABS:      140  |  87<L>  |  101<H>  ----------------------------<  83  3.9   |  42<H>  |  3.41<H>    Ca    10.5      2023 07:54  Phos  3.1       Mg     2.70         TPro  7.3  /  Alb  4.2  /  TBili  0.6  /  DBili      /  AST  18  /  ALT  11  /  AlkPhos  81      Creatinine Trend: 3.41 <--, 3.09 <--, 2.83 <--, 3.19 <--, 3.13 <--, 2.94 <--, 2.55 <--                        8.9    8.51  )-----------( 276      ( 2023 07:54 )             30.9     Urine Studies:  Urinalysis Basic - ( 2023 18:14 )    Color: Yellow / Appearance: Clear / S.014 / pH:   Gluc:  / Ketone: Negative  / Bili: Negative / Urobili: <2 mg/dL   Blood:  / Protein: Negative / Nitrite: Negative   Leuk Esterase: Negative / RBC: 1 /HPF / WBC 1 /HPF   Sq Epi:  / Non Sq Epi:  / Bacteria:       Creatinine, Random Urine: 101 mg/dL ( @ 18:14)

## 2023-02-26 LAB
ANION GAP SERPL CALC-SCNC: 14 MMOL/L — SIGNIFICANT CHANGE UP (ref 7–14)
APTT BLD: 33.7 SEC — SIGNIFICANT CHANGE UP (ref 27–36.3)
BASE EXCESS BLDV CALC-SCNC: 16.5 MMOL/L — HIGH (ref -2–3)
BUN SERPL-MCNC: 108 MG/DL — HIGH (ref 7–23)
CALCIUM SERPL-MCNC: 10.5 MG/DL — SIGNIFICANT CHANGE UP (ref 8.4–10.5)
CHLORIDE SERPL-SCNC: 83 MMOL/L — LOW (ref 98–107)
CO2 BLDV-SCNC: 47.4 MMOL/L — HIGH (ref 22–26)
CO2 SERPL-SCNC: 40 MMOL/L — HIGH (ref 22–31)
CREAT SERPL-MCNC: 3.56 MG/DL — HIGH (ref 0.5–1.3)
EGFR: 13 ML/MIN/1.73M2 — LOW
GAS PNL BLDV: SIGNIFICANT CHANGE UP
GLUCOSE BLDC GLUCOMTR-MCNC: 117 MG/DL — HIGH (ref 70–99)
GLUCOSE BLDC GLUCOMTR-MCNC: 158 MG/DL — HIGH (ref 70–99)
GLUCOSE BLDC GLUCOMTR-MCNC: 188 MG/DL — HIGH (ref 70–99)
GLUCOSE BLDC GLUCOMTR-MCNC: 194 MG/DL — HIGH (ref 70–99)
GLUCOSE SERPL-MCNC: 98 MG/DL — SIGNIFICANT CHANGE UP (ref 70–99)
HCO3 BLDV-SCNC: 45 MMOL/L — CRITICAL HIGH (ref 22–29)
HCT VFR BLD CALC: 30.5 % — LOW (ref 34.5–45)
HGB BLD-MCNC: 8.8 G/DL — LOW (ref 11.5–15.5)
INR BLD: 1.37 RATIO — HIGH (ref 0.88–1.16)
MAGNESIUM SERPL-MCNC: 2.5 MG/DL — SIGNIFICANT CHANGE UP (ref 1.6–2.6)
MCHC RBC-ENTMCNC: 28.9 GM/DL — LOW (ref 32–36)
MCHC RBC-ENTMCNC: 29.4 PG — SIGNIFICANT CHANGE UP (ref 27–34)
MCV RBC AUTO: 102 FL — HIGH (ref 80–100)
NRBC # BLD: 0 /100 WBCS — SIGNIFICANT CHANGE UP (ref 0–0)
NRBC # FLD: 0 K/UL — SIGNIFICANT CHANGE UP (ref 0–0)
PCO2 BLDV: 73 MMHG — HIGH (ref 39–52)
PH BLDV: 7.4 — SIGNIFICANT CHANGE UP (ref 7.32–7.43)
PHOSPHATE SERPL-MCNC: 3.9 MG/DL — SIGNIFICANT CHANGE UP (ref 2.5–4.5)
PLATELET # BLD AUTO: 246 K/UL — SIGNIFICANT CHANGE UP (ref 150–400)
PO2 BLDV: 90 MMHG — HIGH (ref 25–45)
POTASSIUM SERPL-MCNC: 3.4 MMOL/L — LOW (ref 3.5–5.3)
POTASSIUM SERPL-SCNC: 3.4 MMOL/L — LOW (ref 3.5–5.3)
PROTHROM AB SERPL-ACNC: 15.9 SEC — HIGH (ref 10.5–13.4)
RBC # BLD: 2.99 M/UL — LOW (ref 3.8–5.2)
RBC # FLD: 14.5 % — SIGNIFICANT CHANGE UP (ref 10.3–14.5)
SAO2 % BLDV: 98.7 % — HIGH (ref 67–88)
SODIUM SERPL-SCNC: 137 MMOL/L — SIGNIFICANT CHANGE UP (ref 135–145)
WBC # BLD: 9.5 K/UL — SIGNIFICANT CHANGE UP (ref 3.8–10.5)
WBC # FLD AUTO: 9.5 K/UL — SIGNIFICANT CHANGE UP (ref 3.8–10.5)

## 2023-02-26 RX ORDER — HEPARIN SODIUM 5000 [USP'U]/ML
9000 INJECTION INTRAVENOUS; SUBCUTANEOUS EVERY 6 HOURS
Refills: 0 | Status: DISCONTINUED | OUTPATIENT
Start: 2023-02-26 | End: 2023-03-03

## 2023-02-26 RX ORDER — HEPARIN SODIUM 5000 [USP'U]/ML
INJECTION INTRAVENOUS; SUBCUTANEOUS
Qty: 25000 | Refills: 0 | Status: DISCONTINUED | OUTPATIENT
Start: 2023-02-26 | End: 2023-03-03

## 2023-02-26 RX ORDER — HEPARIN SODIUM 5000 [USP'U]/ML
4500 INJECTION INTRAVENOUS; SUBCUTANEOUS EVERY 6 HOURS
Refills: 0 | Status: DISCONTINUED | OUTPATIENT
Start: 2023-02-26 | End: 2023-03-03

## 2023-02-26 RX ORDER — HEPARIN SODIUM 5000 [USP'U]/ML
9000 INJECTION INTRAVENOUS; SUBCUTANEOUS ONCE
Refills: 0 | Status: COMPLETED | OUTPATIENT
Start: 2023-02-26 | End: 2023-02-27

## 2023-02-26 RX ORDER — WARFARIN SODIUM 2.5 MG/1
5 TABLET ORAL ONCE
Refills: 0 | Status: COMPLETED | OUTPATIENT
Start: 2023-02-26 | End: 2023-02-26

## 2023-02-26 RX ORDER — POTASSIUM CHLORIDE 20 MEQ
20 PACKET (EA) ORAL ONCE
Refills: 0 | Status: COMPLETED | OUTPATIENT
Start: 2023-02-26 | End: 2023-02-27

## 2023-02-26 RX ADMIN — Medication 325 MILLIGRAM(S): at 12:48

## 2023-02-26 RX ADMIN — Medication 5 MILLIGRAM(S): at 05:09

## 2023-02-26 RX ADMIN — Medication 5 UNIT(S): at 09:24

## 2023-02-26 RX ADMIN — Medication 5 UNIT(S): at 17:45

## 2023-02-26 RX ADMIN — WARFARIN SODIUM 5 MILLIGRAM(S): 2.5 TABLET ORAL at 22:16

## 2023-02-26 RX ADMIN — Medication 1: at 12:41

## 2023-02-26 RX ADMIN — HEPARIN SODIUM 2000 UNIT(S)/HR: 5000 INJECTION INTRAVENOUS; SUBCUTANEOUS at 20:30

## 2023-02-26 RX ADMIN — Medication 1 SPRAY(S): at 09:26

## 2023-02-26 RX ADMIN — HEPARIN SODIUM 9000 UNIT(S): 5000 INJECTION INTRAVENOUS; SUBCUTANEOUS at 19:45

## 2023-02-26 RX ADMIN — BUDESONIDE AND FORMOTEROL FUMARATE DIHYDRATE 2 PUFF(S): 160; 4.5 AEROSOL RESPIRATORY (INHALATION) at 22:16

## 2023-02-26 RX ADMIN — Medication 25 UNIT(S): at 22:19

## 2023-02-26 RX ADMIN — PANTOPRAZOLE SODIUM 40 MILLIGRAM(S): 20 TABLET, DELAYED RELEASE ORAL at 05:08

## 2023-02-26 RX ADMIN — AMIODARONE HYDROCHLORIDE 200 MILLIGRAM(S): 400 TABLET ORAL at 05:08

## 2023-02-26 RX ADMIN — SIMVASTATIN 10 MILLIGRAM(S): 20 TABLET, FILM COATED ORAL at 22:34

## 2023-02-26 RX ADMIN — Medication 1 SPRAY(S): at 22:16

## 2023-02-26 RX ADMIN — BUDESONIDE AND FORMOTEROL FUMARATE DIHYDRATE 2 PUFF(S): 160; 4.5 AEROSOL RESPIRATORY (INHALATION) at 09:26

## 2023-02-26 RX ADMIN — Medication 1: at 09:25

## 2023-02-26 RX ADMIN — Medication 1 SPRAY(S): at 12:42

## 2023-02-26 RX ADMIN — Medication 5 UNIT(S): at 12:40

## 2023-02-26 RX ADMIN — Medication 1 APPLICATION(S): at 23:14

## 2023-02-26 NOTE — PROGRESS NOTE ADULT - SUBJECTIVE AND OBJECTIVE BOX
SUBJECTIVE / OVERNIGHT EVENTS:pt seen and examined, c/o shortness of breath   23    MEDICATIONS  (STANDING):  aMIOdarone    Tablet 200 milliGRAM(s) Oral daily  budesonide 160 MICROgram(s)/formoterol 4.5 MICROgram(s) Inhaler 2 Puff(s) Inhalation two times a day  dextrose 5%. 1000 milliLiter(s) (50 mL/Hr) IV Continuous <Continuous>  dextrose 5%. 1000 milliLiter(s) (100 mL/Hr) IV Continuous <Continuous>  dextrose 50% Injectable 25 Gram(s) IV Push once  dextrose 50% Injectable 12.5 Gram(s) IV Push once  dextrose 50% Injectable 25 Gram(s) IV Push once  ferrous    sulfate 325 milliGRAM(s) Oral daily  glucagon  Injectable 1 milliGRAM(s) IntraMuscular once  heparin   Injectable 9000 Unit(s) IV Push once  heparin  Infusion.  Unit(s)/Hr (20 mL/Hr) IV Continuous <Continuous>  hydrALAZINE 50 milliGRAM(s) Oral three times a day  insulin detemir injectable (LEVEMIR) 25 Unit(s) SubCutaneous at bedtime  insulin lispro (ADMELOG) corrective regimen sliding scale   SubCutaneous three times a day before meals  insulin lispro (ADMELOG) corrective regimen sliding scale   SubCutaneous at bedtime  insulin lispro Injectable (ADMELOG) 5 Unit(s) SubCutaneous three times a day before meals  isosorbide   mononitrate ER Tablet (IMDUR) 30 milliGRAM(s) Oral daily  pantoprazole    Tablet 40 milliGRAM(s) Oral before breakfast  petrolatum white Ointment 1 Application(s) Topical every 6 hours  potassium chloride    Tablet ER 20 milliEquivalent(s) Oral once  predniSONE   Tablet 5 milliGRAM(s) Oral daily  simvastatin 10 milliGRAM(s) Oral at bedtime  sodium chloride 0.65% Nasal 1 Spray(s) Both Nostrils every 4 hours    MEDICATIONS  (PRN):  acetaminophen     Tablet .. 650 milliGRAM(s) Oral every 6 hours PRN Temp greater or equal to 38C (100.4F), Mild Pain (1 - 3)  albuterol    90 MICROgram(s) HFA Inhaler 2 Puff(s) Inhalation every 6 hours PRN Bronchospasm  aluminum hydroxide/magnesium hydroxide/simethicone Suspension 30 milliLiter(s) Oral every 4 hours PRN Dyspepsia  dextrose Oral Gel 15 Gram(s) Oral once PRN Blood Glucose LESS THAN 70 milliGRAM(s)/deciliter  heparin   Injectable 9000 Unit(s) IV Push every 6 hours PRN For aPTT less than 40  heparin   Injectable 4500 Unit(s) IV Push every 6 hours PRN For aPTT between 40 - 57  lidocaine   4% Patch 1 Patch Transdermal daily PRN pain  melatonin 3 milliGRAM(s) Oral at bedtime PRN Insomnia  ondansetron Injectable 4 milliGRAM(s) IV Push three times a day PRN Nausea and/or Vomiting    Vital Signs Last 24 Hrs  T(C): 36.4 (23 @ 12:27), Max: 36.6 (23 @ 05:10)  T(F): 97.6 (23 @ 12:27), Max: 97.9 (23 @ 05:10)  HR: 67 (23 @ 12:27) (60 - 67)  BP: 100/51 (23 @ 12:27) (100/51 - 109/47)  BP(mean): --  RR: 16 (23 @ 12:27) (16 - 18)  SpO2: 98% (23 @ 22:51) (98% - 100%)        Constitutional: No fever, fatigue  Skin: No rash.  Eyes: No recent vision problems or eye pain.  ENT: No congestion, ear pain, or sore throat.  Cardiovascular: No chest pain or palpation.  Respiratory: sob+  Gastrointestinal: No abdominal pain, nausea, vomiting, or diarrhea.  Genitourinary: No dysuria.  Musculoskeletal: No joint swelling.  Neurologic: No headache.    PHYSICAL EXAM:  GENERAL: NAD  EYES: EOMI, PERRLA  NECK: Supple, No JVD  CHEST/LUNG: dec breath sounds at bases  HEART:  S1 , S2 +  ABDOMEN: soft, bs+  EXTREMITIES:  edema+  NEUROLOGY:alert awake oriented    LABS:      137  |  83<L>  |  108<H>  ----------------------------<  98  3.4<L>   |  40<H>  |  3.56<H>    Ca    10.5      2023 07:26  Phos  3.9       Mg     2.50           Creatinine Trend: 3.56 <--, 3.41 <--, 3.09 <--, 2.83 <--, 3.19 <--, 3.13 <--, 2.94 <--                        8.8    9.50  )-----------( 246      ( 2023 07:26 )             30.5     Urine Studies:  Urinalysis Basic - ( 2023 18:14 )    Color: Yellow / Appearance: Clear / S.014 / pH:   Gluc:  / Ketone: Negative  / Bili: Negative / Urobili: <2 mg/dL   Blood:  / Protein: Negative / Nitrite: Negative   Leuk Esterase: Negative / RBC: 1 /HPF / WBC 1 /HPF   Sq Epi:  / Non Sq Epi:  / Bacteria:       Creatinine, Random Urine: 101 mg/dL ( @ 18:14)            PT/INR - ( 2023 07:26 )   PT: 15.9 sec;   INR: 1.37 ratio         PTT - ( 2023 19:21 )  PTT:33.7 sec         Imaging Personally Reviewed:yes    Consultant(s) Notes Reviewed:  yes    Care Discussed with Consultants/Other Providers:yes

## 2023-02-26 NOTE — PROGRESS NOTE ADULT - SUBJECTIVE AND OBJECTIVE BOX
[facility] NEPHROLOGY- PROGRESS NOTE    72 year old Female with history of COPD, CHF presents with weakness. Nephrology consulted for elevated Scr.    REVIEW OF SYSTEMS:  Gen: no fevers  Cards: no chest pain  Resp: + dyspnea improving  GI: no nausea or vomiting or diarrhea  Vascular: + LE edema improving    No Known Allergies      Hospital Medications: Medications reviewed      VITALS:  T(F): 97.9 (23 @ 05:10), Max: 98.2 (23 @ 12:49)  HR: 60 (23 @ 05:10)  BP: 109/47 (23 @ 05:10)  RR: 18 (23 @ 05:10)  SpO2: 100% (23 @ 05:10)  Wt(kg): --     @ 07:01  -   @ 07:00  --------------------------------------------------------  IN: 250 mL / OUT: 0 mL / NET: 250 mL        PHYSICAL EXAM:    Gen: NAD, calm  Cards: RRR, +S1/S2, no M/G/R  Resp: L basilar rales  GI: soft, + TTP in epigastric area, ND, NABS  Vascular: trace RLE edema only      LABS:      137  |  83<L>  |  108<H>  ----------------------------<  98  3.4<L>   |  40<H>  |  3.56<H>    Ca    10.5      2023 07:26  Phos  3.9       Mg     2.50           Creatinine Trend: 3.56 <--, 3.41 <--, 3.09 <--, 2.83 <--, 3.19 <--, 3.13 <--, 2.94 <--                        8.8    9.50  )-----------( 246      ( 2023 07:26 )             30.5     Urine Studies:  Urinalysis Basic - ( 2023 18:14 )    Color: Yellow / Appearance: Clear / S.014 / pH:   Gluc:  / Ketone: Negative  / Bili: Negative / Urobili: <2 mg/dL   Blood:  / Protein: Negative / Nitrite: Negative   Leuk Esterase: Negative / RBC: 1 /HPF / WBC 1 /HPF   Sq Epi:  / Non Sq Epi:  / Bacteria:       Creatinine, Random Urine: 101 mg/dL ( @ 18:14)

## 2023-02-26 NOTE — CHART NOTE - NSCHARTNOTEFT_GEN_A_CORE
Pts INR today 1.37. Pt with Hx of AFib, Mitral / Tricuspid valve replacement. Will order Coumadin 5mg x1 tonight. Per d/w Dr Newman will start pt on Heparin drip to bridge with Coumadin for INR >2.0. Will cont to monitor pts clinical status.

## 2023-02-27 LAB
ANION GAP SERPL CALC-SCNC: 11 MMOL/L — SIGNIFICANT CHANGE UP (ref 7–14)
APTT BLD: >200 SEC — CRITICAL HIGH (ref 27–36.3)
APTT BLD: SIGNIFICANT CHANGE UP SEC (ref 27–36.3)
BUN SERPL-MCNC: 107 MG/DL — HIGH (ref 7–23)
CALCIUM SERPL-MCNC: 10.5 MG/DL — SIGNIFICANT CHANGE UP (ref 8.4–10.5)
CHLORIDE SERPL-SCNC: 86 MMOL/L — LOW (ref 98–107)
CO2 SERPL-SCNC: 41 MMOL/L — HIGH (ref 22–31)
CREAT SERPL-MCNC: 3.55 MG/DL — HIGH (ref 0.5–1.3)
EGFR: 13 ML/MIN/1.73M2 — LOW
GLUCOSE BLDC GLUCOMTR-MCNC: 111 MG/DL — HIGH (ref 70–99)
GLUCOSE BLDC GLUCOMTR-MCNC: 114 MG/DL — HIGH (ref 70–99)
GLUCOSE BLDC GLUCOMTR-MCNC: 115 MG/DL — HIGH (ref 70–99)
GLUCOSE BLDC GLUCOMTR-MCNC: 190 MG/DL — HIGH (ref 70–99)
GLUCOSE SERPL-MCNC: 96 MG/DL — SIGNIFICANT CHANGE UP (ref 70–99)
HCT VFR BLD CALC: 29.8 % — LOW (ref 34.5–45)
HGB BLD-MCNC: 8.7 G/DL — LOW (ref 11.5–15.5)
INR BLD: 1.65 RATIO — HIGH (ref 0.88–1.16)
MAGNESIUM SERPL-MCNC: 2.5 MG/DL — SIGNIFICANT CHANGE UP (ref 1.6–2.6)
MCHC RBC-ENTMCNC: 29.2 GM/DL — LOW (ref 32–36)
MCHC RBC-ENTMCNC: 29.4 PG — SIGNIFICANT CHANGE UP (ref 27–34)
MCV RBC AUTO: 100.7 FL — HIGH (ref 80–100)
NRBC # BLD: 0 /100 WBCS — SIGNIFICANT CHANGE UP (ref 0–0)
NRBC # FLD: 0 K/UL — SIGNIFICANT CHANGE UP (ref 0–0)
PHOSPHATE SERPL-MCNC: 3.5 MG/DL — SIGNIFICANT CHANGE UP (ref 2.5–4.5)
PLATELET # BLD AUTO: 281 K/UL — SIGNIFICANT CHANGE UP (ref 150–400)
POTASSIUM SERPL-MCNC: 3.6 MMOL/L — SIGNIFICANT CHANGE UP (ref 3.5–5.3)
POTASSIUM SERPL-SCNC: 3.6 MMOL/L — SIGNIFICANT CHANGE UP (ref 3.5–5.3)
PROTHROM AB SERPL-ACNC: 19.2 SEC — HIGH (ref 10.5–13.4)
RBC # BLD: 2.96 M/UL — LOW (ref 3.8–5.2)
RBC # FLD: 14.4 % — SIGNIFICANT CHANGE UP (ref 10.3–14.5)
SODIUM SERPL-SCNC: 138 MMOL/L — SIGNIFICANT CHANGE UP (ref 135–145)
WBC # BLD: 8.85 K/UL — SIGNIFICANT CHANGE UP (ref 3.8–10.5)
WBC # FLD AUTO: 8.85 K/UL — SIGNIFICANT CHANGE UP (ref 3.8–10.5)

## 2023-02-27 PROCEDURE — 99233 SBSQ HOSP IP/OBS HIGH 50: CPT

## 2023-02-27 RX ORDER — WARFARIN SODIUM 2.5 MG/1
5 TABLET ORAL ONCE
Refills: 0 | Status: COMPLETED | OUTPATIENT
Start: 2023-02-27 | End: 2023-02-27

## 2023-02-27 RX ORDER — SENNA PLUS 8.6 MG/1
2 TABLET ORAL AT BEDTIME
Refills: 0 | Status: DISCONTINUED | OUTPATIENT
Start: 2023-02-27 | End: 2023-03-06

## 2023-02-27 RX ORDER — ACETAMINOPHEN 500 MG
650 TABLET ORAL EVERY 6 HOURS
Refills: 0 | Status: DISCONTINUED | OUTPATIENT
Start: 2023-02-27 | End: 2023-03-03

## 2023-02-27 RX ORDER — LIDOCAINE 4 G/100G
1 CREAM TOPICAL ONCE
Refills: 0 | Status: COMPLETED | OUTPATIENT
Start: 2023-02-27 | End: 2023-02-27

## 2023-02-27 RX ORDER — ERYTHROPOIETIN 10000 [IU]/ML
10000 INJECTION, SOLUTION INTRAVENOUS; SUBCUTANEOUS ONCE
Refills: 0 | Status: DISCONTINUED | OUTPATIENT
Start: 2023-02-27 | End: 2023-02-28

## 2023-02-27 RX ORDER — TRAMADOL HYDROCHLORIDE 50 MG/1
25 TABLET ORAL ONCE
Refills: 0 | Status: DISCONTINUED | OUTPATIENT
Start: 2023-02-27 | End: 2023-02-27

## 2023-02-27 RX ORDER — POTASSIUM CHLORIDE 20 MEQ
40 PACKET (EA) ORAL ONCE
Refills: 0 | Status: COMPLETED | OUTPATIENT
Start: 2023-02-27 | End: 2023-02-27

## 2023-02-27 RX ORDER — POLYETHYLENE GLYCOL 3350 17 G/17G
17 POWDER, FOR SOLUTION ORAL DAILY
Refills: 0 | Status: DISCONTINUED | OUTPATIENT
Start: 2023-02-27 | End: 2023-03-06

## 2023-02-27 RX ADMIN — Medication 40 MILLIEQUIVALENT(S): at 17:01

## 2023-02-27 RX ADMIN — Medication 50 MILLIGRAM(S): at 14:40

## 2023-02-27 RX ADMIN — AMIODARONE HYDROCHLORIDE 200 MILLIGRAM(S): 400 TABLET ORAL at 05:43

## 2023-02-27 RX ADMIN — LIDOCAINE 1 PATCH: 4 CREAM TOPICAL at 05:40

## 2023-02-27 RX ADMIN — POLYETHYLENE GLYCOL 3350 17 GRAM(S): 17 POWDER, FOR SOLUTION ORAL at 18:01

## 2023-02-27 RX ADMIN — Medication 1 SPRAY(S): at 00:49

## 2023-02-27 RX ADMIN — HEPARIN SODIUM 9000 UNIT(S): 5000 INJECTION INTRAVENOUS; SUBCUTANEOUS at 17:57

## 2023-02-27 RX ADMIN — Medication 1: at 12:36

## 2023-02-27 RX ADMIN — Medication 650 MILLIGRAM(S): at 12:09

## 2023-02-27 RX ADMIN — TRAMADOL HYDROCHLORIDE 25 MILLIGRAM(S): 50 TABLET ORAL at 17:39

## 2023-02-27 RX ADMIN — Medication 1 SPRAY(S): at 22:43

## 2023-02-27 RX ADMIN — Medication 1 SPRAY(S): at 17:59

## 2023-02-27 RX ADMIN — Medication 650 MILLIGRAM(S): at 17:01

## 2023-02-27 RX ADMIN — Medication 5 MILLIGRAM(S): at 05:41

## 2023-02-27 RX ADMIN — LIDOCAINE 1 PATCH: 4 CREAM TOPICAL at 17:06

## 2023-02-27 RX ADMIN — Medication 1 APPLICATION(S): at 05:42

## 2023-02-27 RX ADMIN — HEPARIN SODIUM 2000 UNIT(S)/HR: 5000 INJECTION INTRAVENOUS; SUBCUTANEOUS at 08:21

## 2023-02-27 RX ADMIN — Medication 650 MILLIGRAM(S): at 05:38

## 2023-02-27 RX ADMIN — Medication 1 APPLICATION(S): at 23:27

## 2023-02-27 RX ADMIN — Medication 1 APPLICATION(S): at 17:11

## 2023-02-27 RX ADMIN — BUDESONIDE AND FORMOTEROL FUMARATE DIHYDRATE 2 PUFF(S): 160; 4.5 AEROSOL RESPIRATORY (INHALATION) at 09:18

## 2023-02-27 RX ADMIN — TRAMADOL HYDROCHLORIDE 25 MILLIGRAM(S): 50 TABLET ORAL at 18:04

## 2023-02-27 RX ADMIN — Medication 50 MILLIGRAM(S): at 22:43

## 2023-02-27 RX ADMIN — Medication 325 MILLIGRAM(S): at 12:09

## 2023-02-27 RX ADMIN — Medication 5 UNIT(S): at 17:59

## 2023-02-27 RX ADMIN — HEPARIN SODIUM 2000 UNIT(S)/HR: 5000 INJECTION INTRAVENOUS; SUBCUTANEOUS at 09:22

## 2023-02-27 RX ADMIN — Medication 5 UNIT(S): at 08:49

## 2023-02-27 RX ADMIN — LIDOCAINE 1 PATCH: 4 CREAM TOPICAL at 06:27

## 2023-02-27 RX ADMIN — ISOSORBIDE MONONITRATE 30 MILLIGRAM(S): 60 TABLET, EXTENDED RELEASE ORAL at 12:49

## 2023-02-27 RX ADMIN — WARFARIN SODIUM 5 MILLIGRAM(S): 2.5 TABLET ORAL at 22:42

## 2023-02-27 RX ADMIN — Medication 25 UNIT(S): at 22:43

## 2023-02-27 RX ADMIN — LIDOCAINE 1 PATCH: 4 CREAM TOPICAL at 22:42

## 2023-02-27 RX ADMIN — PANTOPRAZOLE SODIUM 40 MILLIGRAM(S): 20 TABLET, DELAYED RELEASE ORAL at 09:25

## 2023-02-27 RX ADMIN — HEPARIN SODIUM 1600 UNIT(S)/HR: 5000 INJECTION INTRAVENOUS; SUBCUTANEOUS at 15:13

## 2023-02-27 RX ADMIN — HEPARIN SODIUM 0 UNIT(S)/HR: 5000 INJECTION INTRAVENOUS; SUBCUTANEOUS at 14:12

## 2023-02-27 RX ADMIN — Medication 5 UNIT(S): at 12:35

## 2023-02-27 RX ADMIN — HEPARIN SODIUM 1600 UNIT(S)/HR: 5000 INJECTION INTRAVENOUS; SUBCUTANEOUS at 20:20

## 2023-02-27 RX ADMIN — Medication 1 SPRAY(S): at 05:41

## 2023-02-27 RX ADMIN — BUDESONIDE AND FORMOTEROL FUMARATE DIHYDRATE 2 PUFF(S): 160; 4.5 AEROSOL RESPIRATORY (INHALATION) at 22:41

## 2023-02-27 RX ADMIN — Medication 20 MILLIEQUIVALENT(S): at 17:39

## 2023-02-27 RX ADMIN — SIMVASTATIN 10 MILLIGRAM(S): 20 TABLET, FILM COATED ORAL at 23:28

## 2023-02-27 RX ADMIN — Medication 1 APPLICATION(S): at 12:30

## 2023-02-27 RX ADMIN — HEPARIN SODIUM 0 UNIT(S)/HR: 5000 INJECTION INTRAVENOUS; SUBCUTANEOUS at 23:12

## 2023-02-27 RX ADMIN — Medication 1 SPRAY(S): at 12:36

## 2023-02-27 RX ADMIN — Medication 650 MILLIGRAM(S): at 12:45

## 2023-02-27 RX ADMIN — SENNA PLUS 2 TABLET(S): 8.6 TABLET ORAL at 22:42

## 2023-02-27 NOTE — PROGRESS NOTE ADULT - NS ATTEND OPT1 GEN_ALL_CORE
I independently performed the documented:
I attest my time as attending is greater than 50% of the total combined time spent on qualifying patient care activities by the PA/NP and attending.
Ambulatory

## 2023-02-27 NOTE — PROGRESS NOTE ADULT - SUBJECTIVE AND OBJECTIVE BOX
[facility] NEPHROLOGY- PROGRESS NOTE    72 year old Female with history of COPD, CHF presents with weakness. Nephrology consulted for elevated Scr.    REVIEW OF SYSTEMS:  Gen: no fevers  Cards: no chest pain  Resp: + dyspnea improving  GI: no nausea or vomiting or diarrhea  Vascular: + LE edema improving    No Known Allergies      Hospital Medications: Medications reviewed      VITALS:  T(F): 98.2 (23 @ 05:30), Max: 98.2 (23 @ 22:00)  HR: 55 (23 @ 07:18)  BP: 125/64 (23 @ 05:30)  RR: 18 (23 @ 05:30)  SpO2: 100% (23 @ 07:18)  Wt(kg): --     @ 07:01  -   @ 07:00  --------------------------------------------------------  IN: 120 mL / OUT: 0 mL / NET: 120 mL     @ 07:01  -   @ 14:13  --------------------------------------------------------  IN: 420 mL / OUT: 300 mL / NET: 120 mL        PHYSICAL EXAM:    Gen: NAD, calm  Cards: RRR, +S1/S2, no M/G/R  Resp: L basilar rales  GI: soft, + TTP in epigastric area, ND, NABS  Vascular: trace RLE edema only      LABS:      138  |  86<L>  |  107<H>  ----------------------------<  96  3.6   |  41<H>  |  3.55<H>    Ca    10.5      2023 07:00  Phos  3.5       Mg     2.50           Creatinine Trend: 3.55 <--, 3.56 <--, 3.41 <--, 3.09 <--, 2.83 <--, 3.19 <--, 3.13 <--                        8.7    8.85  )-----------( 281      ( 2023 07:00 )             29.8     Urine Studies:  Urinalysis Basic - ( 2023 18:14 )    Color: Yellow / Appearance: Clear / S.014 / pH:   Gluc:  / Ketone: Negative  / Bili: Negative / Urobili: <2 mg/dL   Blood:  / Protein: Negative / Nitrite: Negative   Leuk Esterase: Negative / RBC: 1 /HPF / WBC 1 /HPF   Sq Epi:  / Non Sq Epi:  / Bacteria:       Creatinine, Random Urine: 101 mg/dL ( @ 18:14)

## 2023-02-27 NOTE — PROGRESS NOTE ADULT - PROBLEM SELECTOR PLAN 1
Very difficult physical exam. Pt states her breathing has improved.   Bumex 1mg/hr- has been held on 2/25.   Currently she is in lot of pain which she states is her chronic sciatic pain- asking for liquid Tylenol which she states helps her.  Continue hydralazine 50mg TID. Missed doses due to borderline hypotension. May need to reduce dose.   Continue Imdur 30mg daily   Strict I/O  Daily standing weights  Monitor lytes replete k to keep 4.0-5.0  and Mg>2.0  Appreciate Nephrology recommendations (workup for L. renal mass)  HF counseling  provided and GOC discussion with daughter on Friday (agreed with DNR/DNI status)

## 2023-02-27 NOTE — CHART NOTE - NSCHARTNOTEFT_GEN_A_CORE
Vital Signs Last 24 Hrs  T(C): 36.9 (27 Feb 2023 14:30), Max: 36.9 (27 Feb 2023 14:30)  T(F): 98.4 (27 Feb 2023 14:30), Max: 98.4 (27 Feb 2023 14:30)  HR: 61 (27 Feb 2023 14:30) (55 - 70)  BP: 118/60 (27 Feb 2023 14:30) (115/54 - 125/64)  BP(mean): --  RR: 18 (27 Feb 2023 14:30) (18 - 18)  SpO2: 100% (27 Feb 2023 14:30) (98% - 100%)    Parameters below as of 27 Feb 2023 14:30  Patient On (Oxygen Delivery Method): nasal cannula  O2 Flow (L/min): 3                        8.7    8.85  )-----------( 281      ( 27 Feb 2023 07:00 )             29.8   02-27    138  |  86<L>  |  107<H>  ----------------------------<  96  3.6   |  41<H>  |  3.55<H>    Ca    10.5      27 Feb 2023 07:00  Phos  3.5     02-27  Mg     2.50     02-27    PT/INR - ( 27 Feb 2023 07:00 )   PT: 19.2 sec;   INR: 1.65 ratio    PTT - ( 27 Feb 2023 13:04 )  PTT:>200.0 sec    Results and case discussed with Dr. Newman, to continue heparin gtt, dosed couadin 5mg. As discussed palliative care called for further GOC/pain control. Discussed plan of care with RN and patient

## 2023-02-27 NOTE — PROGRESS NOTE ADULT - SUBJECTIVE AND OBJECTIVE BOX
SUBJECTIVE / OVERNIGHT EVENTS:pt seen and examined, c/o shortness of breath   23    MEDICATIONS  (STANDING):  aMIOdarone    Tablet 200 milliGRAM(s) Oral daily  budesonide 160 MICROgram(s)/formoterol 4.5 MICROgram(s) Inhaler 2 Puff(s) Inhalation two times a day  dextrose 5%. 1000 milliLiter(s) (50 mL/Hr) IV Continuous <Continuous>  dextrose 5%. 1000 milliLiter(s) (100 mL/Hr) IV Continuous <Continuous>  dextrose 50% Injectable 25 Gram(s) IV Push once  dextrose 50% Injectable 12.5 Gram(s) IV Push once  dextrose 50% Injectable 25 Gram(s) IV Push once  epoetin livia-epbx (RETACRIT) Injectable 21455 Unit(s) SubCutaneous once  ferrous    sulfate 325 milliGRAM(s) Oral daily  glucagon  Injectable 1 milliGRAM(s) IntraMuscular once  heparin  Infusion.  Unit(s)/Hr (20 mL/Hr) IV Continuous <Continuous>  hydrALAZINE 50 milliGRAM(s) Oral three times a day  insulin detemir injectable (LEVEMIR) 25 Unit(s) SubCutaneous at bedtime  insulin lispro (ADMELOG) corrective regimen sliding scale   SubCutaneous three times a day before meals  insulin lispro (ADMELOG) corrective regimen sliding scale   SubCutaneous at bedtime  insulin lispro Injectable (ADMELOG) 5 Unit(s) SubCutaneous three times a day before meals  isosorbide   mononitrate ER Tablet (IMDUR) 30 milliGRAM(s) Oral daily  pantoprazole    Tablet 40 milliGRAM(s) Oral before breakfast  petrolatum white Ointment 1 Application(s) Topical every 6 hours  polyethylene glycol 3350 17 Gram(s) Oral daily  predniSONE   Tablet 5 milliGRAM(s) Oral daily  senna 2 Tablet(s) Oral at bedtime  simvastatin 10 milliGRAM(s) Oral at bedtime  sodium chloride 0.65% Nasal 1 Spray(s) Both Nostrils every 4 hours    MEDICATIONS  (PRN):  acetaminophen    Suspension .. 650 milliGRAM(s) Oral every 6 hours PRN Temp greater or equal to 38C (100.4F), Mild Pain (1 - 3), Moderate Pain (4 - 6)  albuterol    90 MICROgram(s) HFA Inhaler 2 Puff(s) Inhalation every 6 hours PRN Bronchospasm  aluminum hydroxide/magnesium hydroxide/simethicone Suspension 30 milliLiter(s) Oral every 4 hours PRN Dyspepsia  bisacodyl 5 milliGRAM(s) Oral every 12 hours PRN Constipation  dextrose Oral Gel 15 Gram(s) Oral once PRN Blood Glucose LESS THAN 70 milliGRAM(s)/deciliter  heparin   Injectable 9000 Unit(s) IV Push every 6 hours PRN For aPTT less than 40  heparin   Injectable 4500 Unit(s) IV Push every 6 hours PRN For aPTT between 40 - 57  lidocaine   4% Patch 1 Patch Transdermal daily PRN pain  melatonin 3 milliGRAM(s) Oral at bedtime PRN Insomnia  ondansetron Injectable 4 milliGRAM(s) IV Push three times a day PRN Nausea and/or Vomiting    Vital Signs Last 24 Hrs  T(C): 36.9 (23 @ 22:31), Max: 36.9 (23 @ 14:30)  T(F): 98.5 (23 @ 22:31), Max: 98.5 (23 @ 22:31)  HR: 58 (23 @ 22:31) (55 - 67)  BP: 127/50 (23 @ 22:31) (106/46 - 127/50)  BP(mean): --  RR: 19 (23 @ 22:31) (17 - 19)  SpO2: 99% (23 @ 22:31) (95% - 100%)          Constitutional: No fever, fatigue  Skin: No rash.  Eyes: No recent vision problems or eye pain.  ENT: No congestion, ear pain, or sore throat.  Cardiovascular: No chest pain or palpation.  Respiratory: sob+  Gastrointestinal: No abdominal pain, nausea, vomiting, or diarrhea.  Genitourinary: No dysuria.  Musculoskeletal: No joint swelling.  Neurologic: No headache.    PHYSICAL EXAM:  GENERAL: NAD  EYES: EOMI, PERRLA  NECK: Supple, No JVD  CHEST/LUNG: dec breath sounds at bases  HEART:  S1 , S2 +  ABDOMEN: soft, bs+  EXTREMITIES:  edema+  NEUROLOGY:alert awake oriented    LABS:      138  |  86<L>  |  107<H>  ----------------------------<  96  3.6   |  41<H>  |  3.55<H>    Ca    10.5      2023 07:00  Phos  3.5       Mg     2.50           Creatinine Trend: 3.55 <--, 3.56 <--, 3.41 <--, 3.09 <--, 2.83 <--, 3.19 <--, 3.13 <--                        8.7    8.85  )-----------( 281      ( 2023 07:00 )             29.8     Urine Studies:  Urinalysis Basic - ( 2023 18:14 )    Color: Yellow / Appearance: Clear / S.014 / pH:   Gluc:  / Ketone: Negative  / Bili: Negative / Urobili: <2 mg/dL   Blood:  / Protein: Negative / Nitrite: Negative   Leuk Esterase: Negative / RBC: 1 /HPF / WBC 1 /HPF   Sq Epi:  / Non Sq Epi:  / Bacteria:       Creatinine, Random Urine: 101 mg/dL ( @ 18:14)            PT/INR - ( 2023 07:00 )   PT: 19.2 sec;   INR: 1.65 ratio         PTT - ( 2023 21:50 )  PTT:>200.0 sec      Creatinine, Random Urine: 101 mg/dL ( @ 18:14)            PT/INR - ( 2023 07:26 )   PT: 15.9 sec;   INR: 1.37 ratio         PTT - ( 2023 19:21 )  PTT:33.7 sec         Imaging Personally Reviewed:yes    Consultant(s) Notes Reviewed:  yes    Care Discussed with Consultants/Other Providers:yes

## 2023-02-27 NOTE — PROGRESS NOTE ADULT - SUBJECTIVE AND OBJECTIVE BOX
PATIENT SEEN AND EXAMINED ON :- 2/27/23  DATE OF SERVICE:  2/27/23           Interim events noted,Labs ,Radiological studies and Cardiology tests reviewed .       HOSPITAL COURSE: HPI:  71 y/o female with PMHx of Gout, COPD, HTN, DM, CHF, CKD, Afib, Pulm HTN, 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 Patient was recently here for COPD exacerbation and discharged home with therapeutic INR. She was in a relatively baseline state of health and uses 3L NC at home until this PM she started having diffuse epistaxis for about 30 minutes. She states it was jacey blood and she used up almost 1 roll of paper towels. She states she has been taking her medication consistently and able to state that she has taken her warfarin 5mg this AM. Denies chest pain, dyspnea, diarrhea, fever, chills, cough, URI symptoms, headaches. She does have chronic dizziness. Denies melena or hematochezia.  In the ED, NC3L, 168/69. Epistaxis stopped. INR subtherapeutic to 1~. Hgb to ~8 from 10 from last admission. (04 Feb 2023 23:34)      INTERIM EVENTS:Patient seen at bedside ,interim events noted.      PMH -reviewed admission note, no change since admission  HEART FAILURE: Acute[ ]Chronic[ ] Systolic[ ] Diastolic[ ] Combined Systolic and Diastolic[ ]  CAD[ ] CABG[ ] PCI[ ]  DEVICES[ ] PPM[ ] ICD[ ] ILR[ ]  ATRIAL FIBRILLATION[ ] Paroxysmal[ ] Permanent[ ] CHADS2-[  ]  MISAEL[ ] CKD1[ ] CKD2[ ] CKD3[ ] CKD4[ ] ESRD[ ]  COPD[ ] HTN[ ]   DM[ ] Type1[ ] Type 2[ ]   CVA[ ] Paresis[ ]    AMBULATION: Assisted[ ] Cane/walker[ ] Independent[ ]    MEDICATIONS  (STANDING):  aMIOdarone    Tablet 200 milliGRAM(s) Oral daily  budesonide 160 MICROgram(s)/formoterol 4.5 MICROgram(s) Inhaler 2 Puff(s) Inhalation two times a day  dextrose 5%. 1000 milliLiter(s) (50 mL/Hr) IV Continuous <Continuous>  dextrose 5%. 1000 milliLiter(s) (100 mL/Hr) IV Continuous <Continuous>  dextrose 50% Injectable 25 Gram(s) IV Push once  dextrose 50% Injectable 12.5 Gram(s) IV Push once  dextrose 50% Injectable 25 Gram(s) IV Push once  epoetin livia-epbx (RETACRIT) Injectable 20029 Unit(s) SubCutaneous once  ferrous    sulfate 325 milliGRAM(s) Oral daily  glucagon  Injectable 1 milliGRAM(s) IntraMuscular once  heparin  Infusion.  Unit(s)/Hr (20 mL/Hr) IV Continuous <Continuous>  hydrALAZINE 50 milliGRAM(s) Oral three times a day  insulin detemir injectable (LEVEMIR) 25 Unit(s) SubCutaneous at bedtime  insulin lispro (ADMELOG) corrective regimen sliding scale   SubCutaneous three times a day before meals  insulin lispro (ADMELOG) corrective regimen sliding scale   SubCutaneous at bedtime  insulin lispro Injectable (ADMELOG) 5 Unit(s) SubCutaneous three times a day before meals  isosorbide   mononitrate ER Tablet (IMDUR) 30 milliGRAM(s) Oral daily  pantoprazole    Tablet 40 milliGRAM(s) Oral before breakfast  petrolatum white Ointment 1 Application(s) Topical every 6 hours  polyethylene glycol 3350 17 Gram(s) Oral daily  predniSONE   Tablet 5 milliGRAM(s) Oral daily  senna 2 Tablet(s) Oral at bedtime  simvastatin 10 milliGRAM(s) Oral at bedtime  sodium chloride 0.65% Nasal 1 Spray(s) Both Nostrils every 4 hours  warfarin 5 milliGRAM(s) Oral once    MEDICATIONS  (PRN):  acetaminophen    Suspension .. 650 milliGRAM(s) Oral every 6 hours PRN Temp greater or equal to 38C (100.4F), Mild Pain (1 - 3), Moderate Pain (4 - 6)  albuterol    90 MICROgram(s) HFA Inhaler 2 Puff(s) Inhalation every 6 hours PRN Bronchospasm  aluminum hydroxide/magnesium hydroxide/simethicone Suspension 30 milliLiter(s) Oral every 4 hours PRN Dyspepsia  bisacodyl 5 milliGRAM(s) Oral every 12 hours PRN Constipation  dextrose Oral Gel 15 Gram(s) Oral once PRN Blood Glucose LESS THAN 70 milliGRAM(s)/deciliter  heparin   Injectable 9000 Unit(s) IV Push every 6 hours PRN For aPTT less than 40  heparin   Injectable 4500 Unit(s) IV Push every 6 hours PRN For aPTT between 40 - 57  lidocaine   4% Patch 1 Patch Transdermal daily PRN pain  melatonin 3 milliGRAM(s) Oral at bedtime PRN Insomnia  ondansetron Injectable 4 milliGRAM(s) IV Push three times a day PRN Nausea and/or Vomiting            REVIEW OF SYSTEMS:  Constitutional: [ ] fever, [ ]weight loss,  [ ]fatigue [ ]weight gain  Eyes: [ ] visual changes  Respiratory: [ ]shortness of breath;  [ ] cough, [ ]wheezing, [ ]chills, [ ]hemoptysis  Cardiovascular: [ ] chest pain, [ ]palpitations, [ ]dizziness,  [ ]leg swelling[ ]orthopnea[ ]PND  Gastrointestinal: [ ] abdominal pain, [ ]nausea, [ ]vomiting,  [ ]diarrhea [ ]Constipation [ ]Melena  Genitourinary: [ ] dysuria, [ ] hematuria [ ]Junior  Neurologic: [ ] headaches [ ] tremors[ ]weakness [ ]Paralysis Right[ ] Left[ ]  Skin: [ ] itching, [ ]burning, [ ] rashes  Endocrine: [ ] heat or cold intolerance  Musculoskeletal: [ ] joint pain or swelling; [ ] muscle, back, or extremity pain  Psychiatric: [ ] depression, [ ]anxiety, [ ]mood swings, or [ ]difficulty sleeping  Hematologic: [ ] easy bruising, [ ] bleeding gums    [ ] All remaining systems negative except as per above.   [ ]Unable to obtain.  [x] No change in ROS since admission      Vital Signs Last 24 Hrs  T(C): 36.9 (27 Feb 2023 14:30), Max: 36.9 (27 Feb 2023 14:30)  T(F): 98.4 (27 Feb 2023 14:30), Max: 98.4 (27 Feb 2023 14:30)  HR: 67 (27 Feb 2023 15:20) (55 - 70)  BP: 118/60 (27 Feb 2023 14:30) (115/54 - 125/64)  BP(mean): --  RR: 18 (27 Feb 2023 14:30) (18 - 18)  SpO2: 100% (27 Feb 2023 15:20) (98% - 100%)    Parameters below as of 27 Feb 2023 14:30  Patient On (Oxygen Delivery Method): nasal cannula  O2 Flow (L/min): 3    I&O's Summary    26 Feb 2023 07:01  -  27 Feb 2023 07:00  --------------------------------------------------------  IN: 120 mL / OUT: 0 mL / NET: 120 mL    27 Feb 2023 07:01  -  27 Feb 2023 18:23  --------------------------------------------------------  IN: 420 mL / OUT: 300 mL / NET: 120 mL        PHYSICAL EXAM:  General: No acute distress BMI-  HEENT: EOMI, PERRL  Neck: Supple, [ ] JVD  Lungs: Equal air entry bilaterally; [ ] rales [ ] wheezing [ ] rhonchi  Heart: Regular rate and rhythm; [x ] murmur   2/6 [ x] systolic [ ] diastolic [ ] radiation[ ] rubs [ ]  gallops  Abdomen: Nontender, bowel sounds present  Extremities: No clubbing, cyanosis, [ ] edema [ ]Pulses  equal and intact  Nervous system:  Alert & Oriented X3, no focal deficits  Psychiatric: Normal affect  Skin: No rashes or lesions    LABS:  02-27    138  |  86<L>  |  107<H>  ----------------------------<  96  3.6   |  41<H>  |  3.55<H>    Ca    10.5      27 Feb 2023 07:00  Phos  3.5     02-27  Mg     2.50     02-27      Creatinine Trend: 3.55<--, 3.56<--, 3.41<--, 3.09<--, 2.83<--, 3.19<--                        8.7    8.85  )-----------( 281      ( 27 Feb 2023 07:00 )             29.8     PT/INR - ( 27 Feb 2023 07:00 )   PT: 19.2 sec;   INR: 1.65 ratio         PTT - ( 27 Feb 2023 13:04 )  PTT:>200.0 sec

## 2023-02-27 NOTE — PROGRESS NOTE ADULT - NS ATTEND AMEND GEN_ALL_CORE FT
Continue to observe off Bumex for now.  Decrease hydralazine to 25 mg po tid.
Ms. Alcantara is a 72 year old woman with HFpEF, HTN (diagnosed in 30's and reportedly well controlled with medications), severe MR s/p mechanical MVR 1999 ([facility] with Dr. Anderson) and severe TR s/p TVR 2012 ([facility]), ?COPD (home 02; no prior tobacco use), AFib on Coumadin (s/p ablation), PHTN, CKD 3 (b/l SCr ~1.3-1.6 now worsened), MIGUEL, ? RCC, cirrhosis (on imaging) and gout who presented on 2/4 with epistaxis as well as SOB. Of note has had multiple HF hospitalizations. Had met her in 2020 when some concern for mitral stenosis. Had undergone R/LHC which showed no mitral stenosis. RHC at the time also showed mildly elevated filling pressures. Started on bumex gtt with improvement in symptoms (unclear what output has been). On exam, obese, NAD, JVP difficult to appreciate but may be approx 12 cm w/ v waves, RRR, no m/r/g, CTAB, no pedal edema. Labs reviewed - Hb 8.3, BUN/Cr 97/3.01 (uptrending; b/l 2.4), BNP 2800. Overall unclear etiology of profound dyspnea but may be d/t underappreciated mitral stenosis. Would be difficult to do ELIZABETH given her body habitus and risk of aspiration.  - c/w bumex gtt as above for now  - c/w hydral as above  - discussed at length disease process and no clear intervention will benefit her  - d/w daughter; states they had decided on DNR/DNI (please confirm with [facility])   - prognosis guarded

## 2023-02-27 NOTE — PROGRESS NOTE ADULT - SUBJECTIVE AND OBJECTIVE BOX
Medications:  acetaminophen     Tablet .. 650 milliGRAM(s) Oral every 6 hours PRN  albuterol    90 MICROgram(s) HFA Inhaler 2 Puff(s) Inhalation every 6 hours PRN  aluminum hydroxide/magnesium hydroxide/simethicone Suspension 30 milliLiter(s) Oral every 4 hours PRN  aMIOdarone    Tablet 200 milliGRAM(s) Oral daily  budesonide 160 MICROgram(s)/formoterol 4.5 MICROgram(s) Inhaler 2 Puff(s) Inhalation two times a day  dextrose 5%. 1000 milliLiter(s) IV Continuous <Continuous>  dextrose 5%. 1000 milliLiter(s) IV Continuous <Continuous>  dextrose 50% Injectable 25 Gram(s) IV Push once  dextrose 50% Injectable 12.5 Gram(s) IV Push once  dextrose 50% Injectable 25 Gram(s) IV Push once  dextrose Oral Gel 15 Gram(s) Oral once PRN  ferrous    sulfate 325 milliGRAM(s) Oral daily  glucagon  Injectable 1 milliGRAM(s) IntraMuscular once  heparin   Injectable 9000 Unit(s) IV Push once  heparin   Injectable 9000 Unit(s) IV Push every 6 hours PRN  heparin   Injectable 4500 Unit(s) IV Push every 6 hours PRN  heparin  Infusion.  Unit(s)/Hr IV Continuous <Continuous>  hydrALAZINE 50 milliGRAM(s) Oral three times a day  insulin detemir injectable (LEVEMIR) 25 Unit(s) SubCutaneous at bedtime  insulin lispro (ADMELOG) corrective regimen sliding scale   SubCutaneous three times a day before meals  insulin lispro (ADMELOG) corrective regimen sliding scale   SubCutaneous at bedtime  insulin lispro Injectable (ADMELOG) 5 Unit(s) SubCutaneous three times a day before meals  isosorbide   mononitrate ER Tablet (IMDUR) 30 milliGRAM(s) Oral daily  lidocaine   4% Patch 1 Patch Transdermal daily PRN  melatonin 3 milliGRAM(s) Oral at bedtime PRN  ondansetron Injectable 4 milliGRAM(s) IV Push three times a day PRN  pantoprazole    Tablet 40 milliGRAM(s) Oral before breakfast  petrolatum white Ointment 1 Application(s) Topical every 6 hours  potassium chloride    Tablet ER 20 milliEquivalent(s) Oral once  predniSONE   Tablet 5 milliGRAM(s) Oral daily  simvastatin 10 milliGRAM(s) Oral at bedtime  sodium chloride 0.65% Nasal 1 Spray(s) Both Nostrils every 4 hours      Vitals:  Vital Signs Last 24 Hrs  T(C): 36.8 (27 Feb 2023 05:30), Max: 36.8 (26 Feb 2023 22:00)  T(F): 98.2 (27 Feb 2023 05:30), Max: 98.2 (26 Feb 2023 22:00)  HR: 55 (27 Feb 2023 07:18) (55 - 70)  BP: 125/64 (27 Feb 2023 05:30) (100/51 - 125/64)  BP(mean): --  RR: 18 (27 Feb 2023 05:30) (16 - 18)  SpO2: 100% (27 Feb 2023 07:18) (98% - 100%)    Parameters below as of 27 Feb 2023 05:30  Patient On (Oxygen Delivery Method): nasal cannula  O2 Flow (L/min): 3      Daily     Daily     I&O's Detail    26 Feb 2023 07:01  -  27 Feb 2023 07:00  --------------------------------------------------------  IN:    Oral Fluid: 120 mL  Total IN: 120 mL    OUT:  Total OUT: 0 mL    Total NET: 120 mL          Physical Exam:     General: No distress. Comfortable.  HEENT: EOM intact.  Neck: Neck supple. JVP not elevated. No masses  Chest: Clear to auscultation bilaterally  CV: Normal S1 and S2. No murmurs, rub, or gallops. Radial pulses normal.  Abdomen: Soft, non-distended, non-tender  Skin: No rashes or skin breakdown  Neurology: Alert and oriented times three. Sensation intact  Psych: Affect normal    Labs:                        8.7    8.85  )-----------( 281      ( 27 Feb 2023 07:00 )             29.8     02-27    138  |  86<L>  |  107<H>  ----------------------------<  96  3.6   |  41<H>  |  3.55<H>    Ca    10.5      27 Feb 2023 07:00  Phos  3.5     02-27  Mg     2.50     02-27      PT/INR - ( 27 Feb 2023 07:00 )   PT: 19.2 sec;   INR: 1.65 ratio         PTT - ( 26 Feb 2023 19:21 )  PTT:33.7 sec       Patient seen and examined. She is in a lot of pain which she states is her chronic sciatic pain- asking for liquid Tylenol which she states helps her.  Her breathing has gotten better, per pt.   No CP.       Medications:  acetaminophen     Tablet .. 650 milliGRAM(s) Oral every 6 hours PRN  albuterol    90 MICROgram(s) HFA Inhaler 2 Puff(s) Inhalation every 6 hours PRN  aluminum hydroxide/magnesium hydroxide/simethicone Suspension 30 milliLiter(s) Oral every 4 hours PRN  aMIOdarone    Tablet 200 milliGRAM(s) Oral daily  budesonide 160 MICROgram(s)/formoterol 4.5 MICROgram(s) Inhaler 2 Puff(s) Inhalation two times a day  dextrose 5%. 1000 milliLiter(s) IV Continuous <Continuous>  dextrose 5%. 1000 milliLiter(s) IV Continuous <Continuous>  dextrose 50% Injectable 25 Gram(s) IV Push once  dextrose 50% Injectable 12.5 Gram(s) IV Push once  dextrose 50% Injectable 25 Gram(s) IV Push once  dextrose Oral Gel 15 Gram(s) Oral once PRN  ferrous    sulfate 325 milliGRAM(s) Oral daily  glucagon  Injectable 1 milliGRAM(s) IntraMuscular once  heparin   Injectable 9000 Unit(s) IV Push once  heparin   Injectable 9000 Unit(s) IV Push every 6 hours PRN  heparin   Injectable 4500 Unit(s) IV Push every 6 hours PRN  heparin  Infusion.  Unit(s)/Hr IV Continuous <Continuous>  hydrALAZINE 50 milliGRAM(s) Oral three times a day  insulin detemir injectable (LEVEMIR) 25 Unit(s) SubCutaneous at bedtime  insulin lispro (ADMELOG) corrective regimen sliding scale   SubCutaneous three times a day before meals  insulin lispro (ADMELOG) corrective regimen sliding scale   SubCutaneous at bedtime  insulin lispro Injectable (ADMELOG) 5 Unit(s) SubCutaneous three times a day before meals  isosorbide   mononitrate ER Tablet (IMDUR) 30 milliGRAM(s) Oral daily  lidocaine   4% Patch 1 Patch Transdermal daily PRN  melatonin 3 milliGRAM(s) Oral at bedtime PRN  ondansetron Injectable 4 milliGRAM(s) IV Push three times a day PRN  pantoprazole    Tablet 40 milliGRAM(s) Oral before breakfast  petrolatum white Ointment 1 Application(s) Topical every 6 hours  potassium chloride    Tablet ER 20 milliEquivalent(s) Oral once  predniSONE   Tablet 5 milliGRAM(s) Oral daily  simvastatin 10 milliGRAM(s) Oral at bedtime  sodium chloride 0.65% Nasal 1 Spray(s) Both Nostrils every 4 hours      Vitals:  Vital Signs Last 24 Hrs  T(C): 36.8 (27 Feb 2023 05:30), Max: 36.8 (26 Feb 2023 22:00)  T(F): 98.2 (27 Feb 2023 05:30), Max: 98.2 (26 Feb 2023 22:00)  HR: 55 (27 Feb 2023 07:18) (55 - 70)  BP: 125/64 (27 Feb 2023 05:30) (100/51 - 125/64)  BP(mean): --  RR: 18 (27 Feb 2023 05:30) (16 - 18)  SpO2: 100% (27 Feb 2023 07:18) (98% - 100%)    Parameters below as of 27 Feb 2023 05:30  Patient On (Oxygen Delivery Method): nasal cannula  O2 Flow (L/min): 3      Daily     Daily     I&O's Detail    26 Feb 2023 07:01  -  27 Feb 2023 07:00  --------------------------------------------------------  IN:    Oral Fluid: 120 mL  Total IN: 120 mL    OUT:  Total OUT: 0 mL    Total NET: 120 mL          Physical Exam:     General: in pain, obese female.   HEENT: EOM intact.  Neck: Neck supple. JVP very difficult to assess. No masses  Chest: Clear to auscultation bilaterally  CV: Normal S1 and S2. No murmurs, rub, or gallops. Radial pulses normal. No LE edema b/l. Warm b/l.   Abdomen: Soft, non-distended, non-tender  Skin: No rashes or skin breakdown  Neurology: Alert and oriented times three. Sensation intact  Psych: Affect normal    Labs:                        8.7    8.85  )-----------( 281      ( 27 Feb 2023 07:00 )             29.8     02-27    138  |  86<L>  |  107<H>  ----------------------------<  96  3.6   |  41<H>  |  3.55<H>    Ca    10.5      27 Feb 2023 07:00  Phos  3.5     02-27  Mg     2.50     02-27      PT/INR - ( 27 Feb 2023 07:00 )   PT: 19.2 sec;   INR: 1.65 ratio         PTT - ( 26 Feb 2023 19:21 )  PTT:33.7 sec

## 2023-02-28 LAB
ALBUMIN SERPL ELPH-MCNC: 4.3 G/DL — SIGNIFICANT CHANGE UP (ref 3.3–5)
ALP SERPL-CCNC: 80 U/L — SIGNIFICANT CHANGE UP (ref 40–120)
ALT FLD-CCNC: 11 U/L — SIGNIFICANT CHANGE UP (ref 4–33)
ANION GAP SERPL CALC-SCNC: 11 MMOL/L — SIGNIFICANT CHANGE UP (ref 7–14)
ANION GAP SERPL CALC-SCNC: 15 MMOL/L — HIGH (ref 7–14)
APTT BLD: >200 SEC — SIGNIFICANT CHANGE UP (ref 27–36.3)
APTT BLD: >200 SEC — SIGNIFICANT CHANGE UP (ref 27–36.3)
AST SERPL-CCNC: 15 U/L — SIGNIFICANT CHANGE UP (ref 4–32)
BASOPHILS # BLD AUTO: 0.02 K/UL — SIGNIFICANT CHANGE UP (ref 0–0.2)
BASOPHILS NFR BLD AUTO: 0.2 % — SIGNIFICANT CHANGE UP (ref 0–2)
BILIRUB SERPL-MCNC: 0.5 MG/DL — SIGNIFICANT CHANGE UP (ref 0.2–1.2)
BLD GP AB SCN SERPL QL: NEGATIVE — SIGNIFICANT CHANGE UP
BUN SERPL-MCNC: 107 MG/DL — HIGH (ref 7–23)
BUN SERPL-MCNC: 108 MG/DL — HIGH (ref 7–23)
CALCIUM SERPL-MCNC: 10.1 MG/DL — SIGNIFICANT CHANGE UP (ref 8.4–10.5)
CALCIUM SERPL-MCNC: 10.6 MG/DL — HIGH (ref 8.4–10.5)
CHLORIDE SERPL-SCNC: 86 MMOL/L — LOW (ref 98–107)
CHLORIDE SERPL-SCNC: 87 MMOL/L — LOW (ref 98–107)
CO2 SERPL-SCNC: 33 MMOL/L — HIGH (ref 22–31)
CO2 SERPL-SCNC: 38 MMOL/L — HIGH (ref 22–31)
CREAT SERPL-MCNC: 3.4 MG/DL — HIGH (ref 0.5–1.3)
CREAT SERPL-MCNC: 3.52 MG/DL — HIGH (ref 0.5–1.3)
EGFR: 13 ML/MIN/1.73M2 — LOW
EGFR: 14 ML/MIN/1.73M2 — LOW
EOSINOPHIL # BLD AUTO: 0.01 K/UL — SIGNIFICANT CHANGE UP (ref 0–0.5)
EOSINOPHIL NFR BLD AUTO: 0.1 % — SIGNIFICANT CHANGE UP (ref 0–6)
GAS PNL BLDV: SIGNIFICANT CHANGE UP
GLUCOSE BLDC GLUCOMTR-MCNC: 133 MG/DL — HIGH (ref 70–99)
GLUCOSE BLDC GLUCOMTR-MCNC: 133 MG/DL — HIGH (ref 70–99)
GLUCOSE BLDC GLUCOMTR-MCNC: 229 MG/DL — HIGH (ref 70–99)
GLUCOSE BLDC GLUCOMTR-MCNC: 88 MG/DL — SIGNIFICANT CHANGE UP (ref 70–99)
GLUCOSE SERPL-MCNC: 109 MG/DL — HIGH (ref 70–99)
GLUCOSE SERPL-MCNC: 194 MG/DL — HIGH (ref 70–99)
HCT VFR BLD CALC: 26.6 % — LOW (ref 34.5–45)
HCT VFR BLD CALC: 29.8 % — LOW (ref 34.5–45)
HGB BLD-MCNC: 7.8 G/DL — LOW (ref 11.5–15.5)
HGB BLD-MCNC: 8.5 G/DL — LOW (ref 11.5–15.5)
IANC: 7.79 K/UL — HIGH (ref 1.8–7.4)
IMM GRANULOCYTES NFR BLD AUTO: 0.6 % — SIGNIFICANT CHANGE UP (ref 0–0.9)
INR BLD: 1.99 RATIO — HIGH (ref 0.88–1.16)
LYMPHOCYTES # BLD AUTO: 1.31 K/UL — SIGNIFICANT CHANGE UP (ref 1–3.3)
LYMPHOCYTES # BLD AUTO: 13.2 % — SIGNIFICANT CHANGE UP (ref 13–44)
MAGNESIUM SERPL-MCNC: 2.7 MG/DL — HIGH (ref 1.6–2.6)
MAGNESIUM SERPL-MCNC: 2.7 MG/DL — HIGH (ref 1.6–2.6)
MCHC RBC-ENTMCNC: 28.5 GM/DL — LOW (ref 32–36)
MCHC RBC-ENTMCNC: 29.2 PG — SIGNIFICANT CHANGE UP (ref 27–34)
MCHC RBC-ENTMCNC: 29.3 GM/DL — LOW (ref 32–36)
MCHC RBC-ENTMCNC: 29.8 PG — SIGNIFICANT CHANGE UP (ref 27–34)
MCV RBC AUTO: 101.5 FL — HIGH (ref 80–100)
MCV RBC AUTO: 102.4 FL — HIGH (ref 80–100)
MONOCYTES # BLD AUTO: 0.7 K/UL — SIGNIFICANT CHANGE UP (ref 0–0.9)
MONOCYTES NFR BLD AUTO: 7.1 % — SIGNIFICANT CHANGE UP (ref 2–14)
NEUTROPHILS # BLD AUTO: 7.79 K/UL — HIGH (ref 1.8–7.4)
NEUTROPHILS NFR BLD AUTO: 78.8 % — HIGH (ref 43–77)
NRBC # BLD: 0 /100 WBCS — SIGNIFICANT CHANGE UP (ref 0–0)
NRBC # BLD: 0 /100 WBCS — SIGNIFICANT CHANGE UP (ref 0–0)
NRBC # FLD: 0 K/UL — SIGNIFICANT CHANGE UP (ref 0–0)
NRBC # FLD: 0 K/UL — SIGNIFICANT CHANGE UP (ref 0–0)
PHOSPHATE SERPL-MCNC: 4.1 MG/DL — SIGNIFICANT CHANGE UP (ref 2.5–4.5)
PHOSPHATE SERPL-MCNC: 4.8 MG/DL — HIGH (ref 2.5–4.5)
PLATELET # BLD AUTO: 266 K/UL — SIGNIFICANT CHANGE UP (ref 150–400)
PLATELET # BLD AUTO: 288 K/UL — SIGNIFICANT CHANGE UP (ref 150–400)
POTASSIUM SERPL-MCNC: 4.4 MMOL/L — SIGNIFICANT CHANGE UP (ref 3.5–5.3)
POTASSIUM SERPL-MCNC: 4.6 MMOL/L — SIGNIFICANT CHANGE UP (ref 3.5–5.3)
POTASSIUM SERPL-SCNC: 4.4 MMOL/L — SIGNIFICANT CHANGE UP (ref 3.5–5.3)
POTASSIUM SERPL-SCNC: 4.6 MMOL/L — SIGNIFICANT CHANGE UP (ref 3.5–5.3)
PROT SERPL-MCNC: 8.3 G/DL — SIGNIFICANT CHANGE UP (ref 6–8.3)
PROTHROM AB SERPL-ACNC: 23.2 SEC — HIGH (ref 10.5–13.4)
RBC # BLD: 2.62 M/UL — LOW (ref 3.8–5.2)
RBC # BLD: 2.91 M/UL — LOW (ref 3.8–5.2)
RBC # FLD: 14.3 % — SIGNIFICANT CHANGE UP (ref 10.3–14.5)
RBC # FLD: 14.5 % — SIGNIFICANT CHANGE UP (ref 10.3–14.5)
RH IG SCN BLD-IMP: POSITIVE — SIGNIFICANT CHANGE UP
SODIUM SERPL-SCNC: 134 MMOL/L — LOW (ref 135–145)
SODIUM SERPL-SCNC: 136 MMOL/L — SIGNIFICANT CHANGE UP (ref 135–145)
WBC # BLD: 9.27 K/UL — SIGNIFICANT CHANGE UP (ref 3.8–10.5)
WBC # BLD: 9.89 K/UL — SIGNIFICANT CHANGE UP (ref 3.8–10.5)
WBC # FLD AUTO: 9.27 K/UL — SIGNIFICANT CHANGE UP (ref 3.8–10.5)
WBC # FLD AUTO: 9.89 K/UL — SIGNIFICANT CHANGE UP (ref 3.8–10.5)

## 2023-02-28 PROCEDURE — 71045 X-RAY EXAM CHEST 1 VIEW: CPT | Mod: 26

## 2023-02-28 PROCEDURE — 99233 SBSQ HOSP IP/OBS HIGH 50: CPT

## 2023-02-28 RX ORDER — HYDRALAZINE HCL 50 MG
25 TABLET ORAL THREE TIMES A DAY
Refills: 0 | Status: DISCONTINUED | OUTPATIENT
Start: 2023-02-28 | End: 2023-03-06

## 2023-02-28 RX ORDER — INSULIN DETEMIR 100/ML (3)
20 INSULIN PEN (ML) SUBCUTANEOUS AT BEDTIME
Refills: 0 | Status: DISCONTINUED | OUTPATIENT
Start: 2023-02-28 | End: 2023-03-06

## 2023-02-28 RX ORDER — ERYTHROPOIETIN 10000 [IU]/ML
10000 INJECTION, SOLUTION INTRAVENOUS; SUBCUTANEOUS ONCE
Refills: 0 | Status: COMPLETED | OUTPATIENT
Start: 2023-02-28 | End: 2023-02-28

## 2023-02-28 RX ORDER — WARFARIN SODIUM 2.5 MG/1
5 TABLET ORAL ONCE
Refills: 0 | Status: COMPLETED | OUTPATIENT
Start: 2023-02-28 | End: 2023-02-28

## 2023-02-28 RX ORDER — TRAMADOL HYDROCHLORIDE 50 MG/1
25 TABLET ORAL ONCE
Refills: 0 | Status: DISCONTINUED | OUTPATIENT
Start: 2023-02-28 | End: 2023-02-28

## 2023-02-28 RX ADMIN — PANTOPRAZOLE SODIUM 40 MILLIGRAM(S): 20 TABLET, DELAYED RELEASE ORAL at 06:06

## 2023-02-28 RX ADMIN — Medication 325 MILLIGRAM(S): at 13:39

## 2023-02-28 RX ADMIN — LIDOCAINE 1 PATCH: 4 CREAM TOPICAL at 09:00

## 2023-02-28 RX ADMIN — HEPARIN SODIUM 800 UNIT(S)/HR: 5000 INJECTION INTRAVENOUS; SUBCUTANEOUS at 10:44

## 2023-02-28 RX ADMIN — POLYETHYLENE GLYCOL 3350 17 GRAM(S): 17 POWDER, FOR SOLUTION ORAL at 13:40

## 2023-02-28 RX ADMIN — ONDANSETRON 4 MILLIGRAM(S): 8 TABLET, FILM COATED ORAL at 00:09

## 2023-02-28 RX ADMIN — TRAMADOL HYDROCHLORIDE 25 MILLIGRAM(S): 50 TABLET ORAL at 13:40

## 2023-02-28 RX ADMIN — TRAMADOL HYDROCHLORIDE 25 MILLIGRAM(S): 50 TABLET ORAL at 14:24

## 2023-02-28 RX ADMIN — Medication 1 SPRAY(S): at 09:04

## 2023-02-28 RX ADMIN — HEPARIN SODIUM 1200 UNIT(S)/HR: 5000 INJECTION INTRAVENOUS; SUBCUTANEOUS at 00:26

## 2023-02-28 RX ADMIN — LIDOCAINE 1 PATCH: 4 CREAM TOPICAL at 23:13

## 2023-02-28 RX ADMIN — BUDESONIDE AND FORMOTEROL FUMARATE DIHYDRATE 2 PUFF(S): 160; 4.5 AEROSOL RESPIRATORY (INHALATION) at 10:49

## 2023-02-28 RX ADMIN — Medication 1 APPLICATION(S): at 13:40

## 2023-02-28 RX ADMIN — LIDOCAINE 1 PATCH: 4 CREAM TOPICAL at 10:48

## 2023-02-28 RX ADMIN — Medication 5 MILLIGRAM(S): at 06:08

## 2023-02-28 RX ADMIN — Medication 25 MILLIGRAM(S): at 13:39

## 2023-02-28 RX ADMIN — Medication 10 MILLIGRAM(S): at 14:58

## 2023-02-28 RX ADMIN — Medication 1 SPRAY(S): at 13:40

## 2023-02-28 RX ADMIN — Medication 50 MILLIGRAM(S): at 06:06

## 2023-02-28 RX ADMIN — Medication 20 UNIT(S): at 22:57

## 2023-02-28 RX ADMIN — Medication 40 MILLIGRAM(S): at 14:58

## 2023-02-28 RX ADMIN — Medication 1 APPLICATION(S): at 05:58

## 2023-02-28 RX ADMIN — ERYTHROPOIETIN 10000 UNIT(S): 10000 INJECTION, SOLUTION INTRAVENOUS; SUBCUTANEOUS at 13:38

## 2023-02-28 RX ADMIN — Medication 650 MILLIGRAM(S): at 04:46

## 2023-02-28 RX ADMIN — HEPARIN SODIUM 1200 UNIT(S)/HR: 5000 INJECTION INTRAVENOUS; SUBCUTANEOUS at 05:57

## 2023-02-28 RX ADMIN — Medication 1 SPRAY(S): at 05:57

## 2023-02-28 RX ADMIN — AMIODARONE HYDROCHLORIDE 200 MILLIGRAM(S): 400 TABLET ORAL at 06:06

## 2023-02-28 RX ADMIN — Medication 5 UNIT(S): at 09:24

## 2023-02-28 RX ADMIN — HEPARIN SODIUM 400 UNIT(S)/HR: 5000 INJECTION INTRAVENOUS; SUBCUTANEOUS at 19:51

## 2023-02-28 RX ADMIN — HEPARIN SODIUM 0 UNIT(S)/HR: 5000 INJECTION INTRAVENOUS; SUBCUTANEOUS at 09:21

## 2023-02-28 RX ADMIN — HEPARIN SODIUM 1200 UNIT(S)/HR: 5000 INJECTION INTRAVENOUS; SUBCUTANEOUS at 08:40

## 2023-02-28 RX ADMIN — HEPARIN SODIUM 400 UNIT(S)/HR: 5000 INJECTION INTRAVENOUS; SUBCUTANEOUS at 19:53

## 2023-02-28 RX ADMIN — ISOSORBIDE MONONITRATE 30 MILLIGRAM(S): 60 TABLET, EXTENDED RELEASE ORAL at 13:39

## 2023-02-28 NOTE — PROGRESS NOTE ADULT - SUBJECTIVE AND OBJECTIVE BOX
PATIENT SEEN AND EXAMINED ON :- 2/28/23  DATE OF SERVICE:     2/28/23        Interim events noted,Labs ,Radiological studies and Cardiology tests reviewed .       HOSPITAL COURSE: HPI:  71 y/o female with PMHx of Gout, COPD, HTN, DM, CHF, CKD, Afib, Pulm HTN, 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 Patient was recently here for COPD exacerbation and discharged home with therapeutic INR. She was in a relatively baseline state of health and uses 3L NC at home until this PM she started having diffuse epistaxis for about 30 minutes. She states it was jacey blood and she used up almost 1 roll of paper towels. She states she has been taking her medication consistently and able to state that she has taken her warfarin 5mg this AM. Denies chest pain, dyspnea, diarrhea, fever, chills, cough, URI symptoms, headaches. She does have chronic dizziness. Denies melena or hematochezia.  In the ED, NC3L, 168/69. Epistaxis stopped. INR subtherapeutic to 1~. Hgb to ~8 from 10 from last admission. (04 Feb 2023 23:34)      INTERIM EVENTS:Patient seen at bedside ,interim events noted.      PMH -reviewed admission note, no change since admission  HEART FAILURE: Acute[ ]Chronic[ ] Systolic[ ] Diastolic[ ] Combined Systolic and Diastolic[ ]  CAD[ ] CABG[ ] PCI[ ]  DEVICES[ ] PPM[ ] ICD[ ] ILR[ ]  ATRIAL FIBRILLATION[ ] Paroxysmal[ ] Permanent[ ] CHADS2-[  ]  MISAEL[ ] CKD1[ ] CKD2[ ] CKD3[ ] CKD4[ ] ESRD[ ]  COPD[ ] HTN[ ]   DM[ ] Type1[ ] Type 2[ ]   CVA[ ] Paresis[ ]    AMBULATION: Assisted[ ] Cane/walker[ ] Independent[ ]    MEDICATIONS  (STANDING):  aMIOdarone    Tablet 200 milliGRAM(s) Oral daily  budesonide 160 MICROgram(s)/formoterol 4.5 MICROgram(s) Inhaler 2 Puff(s) Inhalation two times a day  dextrose 5%. 1000 milliLiter(s) (50 mL/Hr) IV Continuous <Continuous>  dextrose 5%. 1000 milliLiter(s) (100 mL/Hr) IV Continuous <Continuous>  dextrose 50% Injectable 25 Gram(s) IV Push once  dextrose 50% Injectable 12.5 Gram(s) IV Push once  dextrose 50% Injectable 25 Gram(s) IV Push once  ferrous    sulfate 325 milliGRAM(s) Oral daily  glucagon  Injectable 1 milliGRAM(s) IntraMuscular once  heparin  Infusion.  Unit(s)/Hr (20 mL/Hr) IV Continuous <Continuous>  hydrALAZINE 25 milliGRAM(s) Oral three times a day  insulin detemir injectable (LEVEMIR) 25 Unit(s) SubCutaneous at bedtime  insulin lispro (ADMELOG) corrective regimen sliding scale   SubCutaneous three times a day before meals  insulin lispro (ADMELOG) corrective regimen sliding scale   SubCutaneous at bedtime  insulin lispro Injectable (ADMELOG) 5 Unit(s) SubCutaneous three times a day before meals  isosorbide   mononitrate ER Tablet (IMDUR) 30 milliGRAM(s) Oral daily  pantoprazole    Tablet 40 milliGRAM(s) Oral before breakfast  petrolatum white Ointment 1 Application(s) Topical every 6 hours  polyethylene glycol 3350 17 Gram(s) Oral daily  predniSONE   Tablet 5 milliGRAM(s) Oral daily  senna 2 Tablet(s) Oral at bedtime  simvastatin 10 milliGRAM(s) Oral at bedtime  sodium chloride 0.65% Nasal 1 Spray(s) Both Nostrils every 4 hours  torsemide 40 milliGRAM(s) Oral daily  warfarin 5 milliGRAM(s) Oral once    MEDICATIONS  (PRN):  acetaminophen    Suspension .. 650 milliGRAM(s) Oral every 6 hours PRN Temp greater or equal to 38C (100.4F), Mild Pain (1 - 3), Moderate Pain (4 - 6)  albuterol    90 MICROgram(s) HFA Inhaler 2 Puff(s) Inhalation every 6 hours PRN Bronchospasm  aluminum hydroxide/magnesium hydroxide/simethicone Suspension 30 milliLiter(s) Oral every 4 hours PRN Dyspepsia  bisacodyl 5 milliGRAM(s) Oral every 12 hours PRN Constipation  dextrose Oral Gel 15 Gram(s) Oral once PRN Blood Glucose LESS THAN 70 milliGRAM(s)/deciliter  heparin   Injectable 9000 Unit(s) IV Push every 6 hours PRN For aPTT less than 40  heparin   Injectable 4500 Unit(s) IV Push every 6 hours PRN For aPTT between 40 - 57  lidocaine   4% Patch 1 Patch Transdermal daily PRN pain  melatonin 3 milliGRAM(s) Oral at bedtime PRN Insomnia  ondansetron Injectable 4 milliGRAM(s) IV Push three times a day PRN Nausea and/or Vomiting            REVIEW OF SYSTEMS:  Constitutional: [ ] fever, [ ]weight loss,  [ ]fatigue [ ]weight gain  Eyes: [ ] visual changes  Respiratory: [ ]shortness of breath;  [ ] cough, [ ]wheezing, [ ]chills, [ ]hemoptysis  Cardiovascular: [ ] chest pain, [ ]palpitations, [ ]dizziness,  [ ]leg swelling[ ]orthopnea[ ]PND  Gastrointestinal: [ ] abdominal pain, [ ]nausea, [ ]vomiting,  [ ]diarrhea [ ]Constipation [ ]Melena  Genitourinary: [ ] dysuria, [ ] hematuria [ ]Junior  Neurologic: [ ] headaches [ ] tremors[ ]weakness [ ]Paralysis Right[ ] Left[ ]  Skin: [ ] itching, [ ]burning, [ ] rashes  Endocrine: [ ] heat or cold intolerance  Musculoskeletal: [ ] joint pain or swelling; [ ] muscle, back, or extremity pain  Psychiatric: [ ] depression, [ ]anxiety, [ ]mood swings, or [ ]difficulty sleeping  Hematologic: [ ] easy bruising, [ ] bleeding gums    [ ] All remaining systems negative except as per above.   [ ]Unable to obtain.  [x] No change in ROS since admission      Vital Signs Last 24 Hrs  T(C): 36.7 (28 Feb 2023 19:00), Max: 36.9 (27 Feb 2023 22:31)  T(F): 98 (28 Feb 2023 19:00), Max: 98.5 (27 Feb 2023 22:31)  HR: 56 (28 Feb 2023 19:00) (56 - 84)  BP: 118/56 (28 Feb 2023 19:00) (108/47 - 154/57)  BP(mean): --  RR: 21 (28 Feb 2023 19:00) (18 - 24)  SpO2: 100% (28 Feb 2023 19:00) (77% - 100%)    Parameters below as of 28 Feb 2023 19:00  Patient On (Oxygen Delivery Method): BiPAP/CPAP      I&O's Summary    27 Feb 2023 07:01  -  28 Feb 2023 07:00  --------------------------------------------------------  IN: 420 mL / OUT: 300 mL / NET: 120 mL        PHYSICAL EXAM:  General: No acute distress BMI-  HEENT: EOMI, PERRL  Neck: Supple, [ ] JVD  Lungs: Equal air entry bilaterally; [ ] rales [ ] wheezing [ ] rhonchi  Heart: Regular rate and rhythm; [x ] murmur   2/6 [ x] systolic [ ] diastolic [ ] radiation[ ] rubs [ ]  gallops  Abdomen: Nontender, bowel sounds present  Extremities: No clubbing, cyanosis, [ ] edema [ ]Pulses  equal and intact  Nervous system:  Alert & Oriented X3, no focal deficits  Psychiatric: Normal affect  Skin: No rashes or lesions    LABS:  02-28    136  |  87<L>  |  108<H>  ----------------------------<  194<H>  4.6   |  38<H>  |  3.52<H>    Ca    10.6<H>      28 Feb 2023 17:00  Phos  4.8     02-28  Mg     2.70     02-28    TPro  8.3  /  Alb  4.3  /  TBili  0.5  /  DBili  x   /  AST  15  /  ALT  11  /  AlkPhos  80  02-28    Creatinine Trend: 3.52<--, 3.40<--, 3.55<--, 3.56<--, 3.41<--, 3.09<--                        8.5    9.89  )-----------( 288      ( 28 Feb 2023 17:00 )             29.8     PT/INR - ( 28 Feb 2023 06:39 )   PT: 23.2 sec;   INR: 1.99 ratio         PTT - ( 28 Feb 2023 17:30 )  PTT:>200 sec

## 2023-02-28 NOTE — PROGRESS NOTE ADULT - PROBLEM SELECTOR PLAN 1
as per pulm , cont bronchodilators  BPAP at night  prednisone  taper 02  diuresis per cardio-   appreciate chf team input  pts daughter / pt want fullcode

## 2023-02-28 NOTE — RAPID RESPONSE TEAM SUMMARY - NSSITUATIONBACKGROUNDRRT_GEN_ALL_CORE
73 y/o female with PMHx of Gout, COPD, HTN, DM, CHF, CKD, Afib, Pulm HTN, Mitral and Tricuspid Valve replacement (mechanical valve on warfarin), on 3L home O2 presents to ED for evaluation of epistaxis now admitted due to concern of acute drop in hemoglobin and subtherapeutic INR. RRT called for AMS. Upon arrival patient hypoxic to 80s, put on NRB w/ improvement. Other vitals signs wnl. Throughout exam, patient was tired appearing but responsive, following commands, neuro exam wnl. Patient eventually weaned off o2. Bedside US shows scattered B lines and L sided atelectasis. Labs drawn, likely cause of ams metabolic due to co2 retention. VSS stable, patient at baseline mental status. Primary team at bedside and will follow up with labs and escalate to bipap if necessry. Plan communicated and understood.

## 2023-02-28 NOTE — PROGRESS NOTE ADULT - SUBJECTIVE AND OBJECTIVE BOX
[facility] NEPHROLOGY- PROGRESS NOTE    72 year old Female with history of COPD, CHF presents with weakness. Nephrology consulted for elevated Scr.    REVIEW OF SYSTEMS:  Gen: no fevers  Cards: no chest pain  Resp: + dyspnea improving  GI: no nausea or vomiting or diarrhea  Vascular: + LE edema improving    No Known Allergies      Hospital Medications: Medications reviewed      VITALS:  T(F): 97.8 (23 @ 05:45), Max: 98.5 (23 @ 22:31)  HR: 58 (23 @ 05:45)  BP: 108/47 (23 @ 05:45)  RR: 18 (23 @ 05:45)  SpO2: 100% (23 @ 05:45)  Wt(kg): --     @ 07:01  -   @ 07:00  --------------------------------------------------------  IN: 420 mL / OUT: 300 mL / NET: 120 mL      PHYSICAL EXAM:    Gen: NAD, calm  Cards: RRR, +S1/S2, no M/G/R  Resp: L basilar rales  GI: soft, + TTP in epigastric area, ND, NABS  Vascular: trace RLE edema only      LABS:      134<L>  |  86<L>  |  107<H>  ----------------------------<  109<H>  4.4   |  33<H>  |  3.40<H>    Ca    10.1      2023 06:39  Phos  4.1       Mg     2.70           Creatinine Trend: 3.40 <--, 3.55 <--, 3.56 <--, 3.41 <--, 3.09 <--, 2.83 <--, 3.19 <--                        7.8    9.27  )-----------( 266      ( 2023 06:39 )             26.6     Urine Studies:  Urinalysis Basic - ( 2023 18:14 )    Color: Yellow / Appearance: Clear / S.014 / pH:   Gluc:  / Ketone: Negative  / Bili: Negative / Urobili: <2 mg/dL   Blood:  / Protein: Negative / Nitrite: Negative   Leuk Esterase: Negative / RBC: 1 /HPF / WBC 1 /HPF   Sq Epi:  / Non Sq Epi:  / Bacteria:       Creatinine, Random Urine: 101 mg/dL ( @ 18:14)

## 2023-02-28 NOTE — PROGRESS NOTE ADULT - SUBJECTIVE AND OBJECTIVE BOX
SUBJECTIVE / OVERNIGHT EVENTS:pt seen and examined, c/o shortness of breath   02-28-23    MEDICATIONS  (STANDING):  aMIOdarone    Tablet 200 milliGRAM(s) Oral daily  budesonide 160 MICROgram(s)/formoterol 4.5 MICROgram(s) Inhaler 2 Puff(s) Inhalation two times a day  dextrose 5%. 1000 milliLiter(s) (50 mL/Hr) IV Continuous <Continuous>  dextrose 5%. 1000 milliLiter(s) (100 mL/Hr) IV Continuous <Continuous>  dextrose 50% Injectable 25 Gram(s) IV Push once  dextrose 50% Injectable 12.5 Gram(s) IV Push once  dextrose 50% Injectable 25 Gram(s) IV Push once  ferrous    sulfate 325 milliGRAM(s) Oral daily  glucagon  Injectable 1 milliGRAM(s) IntraMuscular once  heparin  Infusion.  Unit(s)/Hr (20 mL/Hr) IV Continuous <Continuous>  hydrALAZINE 25 milliGRAM(s) Oral three times a day  insulin detemir injectable (LEVEMIR) 20 Unit(s) SubCutaneous at bedtime  insulin lispro (ADMELOG) corrective regimen sliding scale   SubCutaneous three times a day before meals  insulin lispro (ADMELOG) corrective regimen sliding scale   SubCutaneous at bedtime  insulin lispro Injectable (ADMELOG) 5 Unit(s) SubCutaneous three times a day before meals  isosorbide   mononitrate ER Tablet (IMDUR) 30 milliGRAM(s) Oral daily  pantoprazole    Tablet 40 milliGRAM(s) Oral before breakfast  petrolatum white Ointment 1 Application(s) Topical every 6 hours  polyethylene glycol 3350 17 Gram(s) Oral daily  predniSONE   Tablet 5 milliGRAM(s) Oral daily  senna 2 Tablet(s) Oral at bedtime  simvastatin 10 milliGRAM(s) Oral at bedtime  sodium chloride 0.65% Nasal 1 Spray(s) Both Nostrils every 4 hours  torsemide 40 milliGRAM(s) Oral daily    MEDICATIONS  (PRN):  acetaminophen    Suspension .. 650 milliGRAM(s) Oral every 6 hours PRN Temp greater or equal to 38C (100.4F), Mild Pain (1 - 3), Moderate Pain (4 - 6)  albuterol    90 MICROgram(s) HFA Inhaler 2 Puff(s) Inhalation every 6 hours PRN Bronchospasm  aluminum hydroxide/magnesium hydroxide/simethicone Suspension 30 milliLiter(s) Oral every 4 hours PRN Dyspepsia  bisacodyl 5 milliGRAM(s) Oral every 12 hours PRN Constipation  dextrose Oral Gel 15 Gram(s) Oral once PRN Blood Glucose LESS THAN 70 milliGRAM(s)/deciliter  heparin   Injectable 9000 Unit(s) IV Push every 6 hours PRN For aPTT less than 40  heparin   Injectable 4500 Unit(s) IV Push every 6 hours PRN For aPTT between 40 - 57  lidocaine   4% Patch 1 Patch Transdermal daily PRN pain  melatonin 3 milliGRAM(s) Oral at bedtime PRN Insomnia  ondansetron Injectable 4 milliGRAM(s) IV Push three times a day PRN Nausea and/or Vomiting    Vital Signs Last 24 Hrs  T(C): 36.7 (02-28-23 @ 19:00), Max: 36.7 (02-28-23 @ 19:00)  T(F): 98 (02-28-23 @ 19:00), Max: 98 (02-28-23 @ 19:00)  HR: 56 (02-28-23 @ 19:00) (56 - 84)  BP: 118/56 (02-28-23 @ 19:00) (108/47 - 154/57)  BP(mean): --  RR: 21 (02-28-23 @ 19:00) (18 - 24)  SpO2: 100% (02-28-23 @ 19:00) (77% - 100%)          Constitutional: No fever, fatigue  Skin: No rash.  Eyes: No recent vision problems or eye pain.  ENT: No congestion, ear pain, or sore throat.  Cardiovascular: No chest pain or palpation.  Respiratory: sob+  Gastrointestinal: No abdominal pain, nausea, vomiting, or diarrhea.  Genitourinary: No dysuria.  Musculoskeletal: No joint swelling.  Neurologic: No headache.    PHYSICAL EXAM:  GENERAL: NAD  EYES: EOMI, PERRLA  NECK: Supple, No JVD  CHEST/LUNG: dec breath sounds at bases  HEART:  S1 , S2 +  ABDOMEN: soft, bs+  EXTREMITIES:  edema+  NEUROLOGY:alert awake oriented    LABS:  02-28    136  |  87<L>  |  108<H>  ----------------------------<  194<H>  4.6   |  38<H>  |  3.52<H>    Ca    10.6<H>      28 Feb 2023 17:00  Phos  4.8     02-28  Mg     2.70     02-28    TPro  8.3  /  Alb  4.3  /  TBili  0.5  /  DBili      /  AST  15  /  ALT  11  /  AlkPhos  80  02-28    Creatinine Trend: 3.52 <--, 3.40 <--, 3.55 <--, 3.56 <--, 3.41 <--, 3.09 <--, 2.83 <--, 3.19 <--                        8.5    9.89  )-----------( 288      ( 28 Feb 2023 17:00 )             29.8     Urine Studies:            LIVER FUNCTIONS - ( 28 Feb 2023 17:00 )  Alb: 4.3 g/dL / Pro: 8.3 g/dL / ALK PHOS: 80 U/L / ALT: 11 U/L / AST: 15 U/L / GGT: x           PT/INR - ( 28 Feb 2023 06:39 )   PT: 23.2 sec;   INR: 1.99 ratio         PTT - ( 28 Feb 2023 17:30 )  PTT:>200 sec  Imaging Personally Reviewed:yes    Consultant(s) Notes Reviewed:  yes    Care Discussed with Consultants/Other Providers:yes

## 2023-02-28 NOTE — CHART NOTE - NSCHARTNOTEFT_GEN_A_CORE
[facility] MEDICINE COVERAGE - Medicine Subsequent Hospital Care Note    RRT called this evening by RN for unresponsiveness/AMS (see RRT note). Upon initial assessment, patient with delayed response then improved; Alert to touch/speech, able to answer questions, denies chest pain, chills, vision changes, N/V. Desat on NC, placed in bipap during RRT.     Vital Signs Last 24 Hrs  T(C): 36.7 (28 Feb 2023 19:00), Max: 36.9 (27 Feb 2023 22:31)  T(F): 98 (28 Feb 2023 19:00), Max: 98.5 (27 Feb 2023 22:31)  HR: 56 (28 Feb 2023 19:00) (56 - 84)  BP: 118/56 (28 Feb 2023 19:00) (108/47 - 154/57)  BP(mean): --  RR: 21 (28 Feb 2023 19:00) (18 - 24)  SpO2: 100% (28 Feb 2023 19:00) (77% - 100%)    Parameters below as of 28 Feb 2023 19:00  Patient On (Oxygen Delivery Method): BiPAP/CPAP      PHYSICAL EXAM:  General: lethargic, responding to stimuli  Respiratory: Normal respiratory effort, no wheezing  Cardiovascular: regular rate and rhythm, S1 and S2, +Tele with few PACs and vtach (reviewed with ARBEN Luciano at bedside)   Gastrointestinal: soft, nontender, hypoactive sounds  Skin: warm, dry to touch   Neurologic: sensation grossly intact, non-focal, A&O x3  Musculoskeletal: decreased ROM, LE edema improving     LABS:                        8.5    9.89  )-----------( 288      ( 28 Feb 2023 17:00 )             29.8     02-28    136  |  87<L>  |  108<H>  ----------------------------<  194<H>  4.6   |  38<H>  |  3.52<H>    Ca    10.6<H>      28 Feb 2023 17:00  Phos  4.8     02-28  Mg     2.70     02-28    TPro  8.3  /  Alb  4.3  /  TBili  0.5  /  DBili  x   /  AST  15  /  ALT  11  /  AlkPhos  80  02-28    PT/INR: PT: x | INR: x (02-28-23 @ 17:30)  PTT: >200 sec (02-28-23 @ 17:30)  PT/INR: PT: 23.2 sec | INR: 1.99 ratio (02-28-23 @ 06:39)  PTT: >200 sec (02-28-23 @ 06:39)    Blood Gas Venous (02-28-23 @ 19:06):  pH: 7.34 | HCO3: 43 | pCO2: 79 | pO2: 87 | Lactate: 1.0  pH: 7.40 | HCO3: 45 | pCO2: 73 | pO2: 90 | Lactate: --  pH: 7.43 | HCO3: 46 | pCO2: 69 | pO2: 77 | Lactate: --      CAPILLARY BLOOD GLUCOSE:  POCT Blood Glucose: 229 mg/dL (02-28-23 @ 16:35)  POCT Blood Glucose: 133 mg/dL (02-28-23 @ 12:06)    I&O's Summary    27 Feb 2023 07:01  -  28 Feb 2023 07:00  --------------------------------------------------------  IN: 420 mL / OUT: 300 mL / NET: 120 mL    ASSESSMENT/PLAN: 72F w/ PMHx of Gout, COPD, HTN, DM, CHF, CKD, Afib, Pulm HTN, Mitral and Tricuspid Valve replacement (mechanical valve on warfarin), on 3L home O2 presented to ED for evaluation of epistaxis now admitted due to concern of acute drop in hemoglobin and subtherapeutic INR, sp Bumex gtt, currently on heparin gtt, now with AMS with hypoxia 80s on NC.        Plan:  AMS/dyspnea   - likely metabolic changes with respiratory/copd component  - CBC, CMP, VBG, CXR ordered  - 3L NC desatured briefly to 80s improved to 94%  - placed on Bipap as per MAR given blood gas results  - CTH deferred by Dr. Newman and MAR as low concern for Cva  - Neuro checks q4h  - monitor on  and telemetry     History of mitral valve replacement with mechanical valve  - Currently subtherapeutic INR 1.99  - as discussed with Dr. Newman, continued on heparin gtt and dosed Warfarin 5mg today  - maintain bleeding precautions  - continue telemetry   - Tele with few PACs and vtach (reviewed with ARBEN Luciano at bedside)      COPD, severe  - Pulmonary following   - continue bronchodilators  - continue Prednisone 5mg po qd  - VBG reviewed with MAR and Dr. Newman> placed in BIPAP  - continuous pulse oximetry   - HOB elevated     MISAEL on CKD   - Nephro following  - Avoid nephrotoxins  - Trend Cr  - Monitor electrolytes     CHF   - LE edema -Improving, s/p Bumex gtt now on PO Torsemide   - Pt with elevated serum bicarbonate but with mixed disorder (respiratory acidosis and metabolic alkalosis with overall acidemia on blood gas)  - heart failure closely following   - strict I/Os, daily weights     Chronic atrial fibrillation  - Rate controlled- heparin gtt bridging to Coumadin       - Clinical findings, labs, telemetry, and EKG reviewed with attending Dr. Newman, deffered CTH, and continue BIPAP, awaiting palliative care consult.   Writer discussed findings and plan of care with patient's daughter Tayla [phone] this evening, daughter confirms that they want patient FULL CODE, and to continue with medical management/bipap.   Discussed with RN and attending, Will monitor patient closely.     JONATHON [facility] Medicine ACP  Pg [phone]     medium complexity/ risk (45 min) [facility] MEDICINE COVERAGE - Medicine Subsequent Hospital Care Note    RRT called this evening by RN for unresponsiveness/AMS (see RRT note). Upon initial assessment, patient with delayed response then improved; Alert to touch/speech, able to answer questions, denies chest pain, chills, vision changes, N/V. Hypoxic to 80s, placed on NRB w/ improvement during RRT, then weaned to NC.     Vital Signs Last 24 Hrs  T(C): 36.7 (28 Feb 2023 19:00), Max: 36.9 (27 Feb 2023 22:31)  T(F): 98 (28 Feb 2023 19:00), Max: 98.5 (27 Feb 2023 22:31)  HR: 56 (28 Feb 2023 19:00) (56 - 84)  BP: 118/56 (28 Feb 2023 19:00) (108/47 - 154/57)  BP(mean): --  RR: 21 (28 Feb 2023 19:00) (18 - 24)  SpO2: 100% (28 Feb 2023 19:00) (77% - 100%)    Parameters below as of 28 Feb 2023 19:00  Patient On (Oxygen Delivery Method): BiPAP/CPAP      PHYSICAL EXAM:  General: lethargic, responding to stimuli  Respiratory: Normal respiratory effort, no wheezing  Cardiovascular: regular rate and rhythm, S1 and S2, +Tele with few PACs and vtach (reviewed with ARBEN Luciano at bedside)   Gastrointestinal: soft, nontender, hypoactive sounds  Skin: warm, dry to touch   Neurologic: sensation grossly intact, non-focal, A&O x3  Musculoskeletal: decreased ROM, LE edema improving     LABS:                        8.5    9.89  )-----------( 288      ( 28 Feb 2023 17:00 )             29.8     02-28    136  |  87<L>  |  108<H>  ----------------------------<  194<H>  4.6   |  38<H>  |  3.52<H>    Ca    10.6<H>      28 Feb 2023 17:00  Phos  4.8     02-28  Mg     2.70     02-28    TPro  8.3  /  Alb  4.3  /  TBili  0.5  /  DBili  x   /  AST  15  /  ALT  11  /  AlkPhos  80  02-28    PT/INR: PT: x | INR: x (02-28-23 @ 17:30)  PTT: >200 sec (02-28-23 @ 17:30)  PT/INR: PT: 23.2 sec | INR: 1.99 ratio (02-28-23 @ 06:39)  PTT: >200 sec (02-28-23 @ 06:39)    Blood Gas Venous (02-28-23 @ 19:06):  pH: 7.34 | HCO3: 43 | pCO2: 79 | pO2: 87 | Lactate: 1.0  pH: 7.40 | HCO3: 45 | pCO2: 73 | pO2: 90 | Lactate: --  pH: 7.43 | HCO3: 46 | pCO2: 69 | pO2: 77 | Lactate: --      CAPILLARY BLOOD GLUCOSE:  POCT Blood Glucose: 229 mg/dL (02-28-23 @ 16:35)  POCT Blood Glucose: 133 mg/dL (02-28-23 @ 12:06)    I&O's Summary    27 Feb 2023 07:01  -  28 Feb 2023 07:00  --------------------------------------------------------  IN: 420 mL / OUT: 300 mL / NET: 120 mL    ASSESSMENT/PLAN: 72F w/ PMHx of Gout, COPD, HTN, DM, CHF, CKD, Afib, Pulm HTN, Mitral and Tricuspid Valve replacement (mechanical valve on warfarin), on 3L home O2 presented to ED for evaluation of epistaxis now admitted due to concern of acute drop in hemoglobin and subtherapeutic INR, sp Bumex gtt, currently on heparin gtt, now with AMS with hypoxia 80s on NC.        Plan:  AMS/dyspnea   - likely metabolic changes with worsening hypoxemic and hypercarbic respiratory failure with morbid obesity MIGUEL/COPD/HF, also renal failure   - CBC, CMP, VBG, CXR ordered  - 3L NC desatured briefly to 80s improved to 94%  - placed on Bipap as per MAR given blood gas results  - CTH deferred by Dr. Newman and MAR as low concern for Cva  - Neuro checks q4h  - monitor on  and telemetry     History of mitral valve replacement with mechanical valve  - Currently subtherapeutic INR 1.99  - as discussed with Dr. Newman, continued on heparin gtt and dosed Warfarin 5mg today  - maintain bleeding precautions  - continue telemetry   - Tele with few PACs and vtach (reviewed with ARBEN Luciano at bedside)      COPD, severe  - Pulmonary following   - continue bronchodilators  - continue Prednisone 5mg po qd  - VBG reviewed with MAR and Dr. Newman> placed in BIPAP  - continuous pulse oximetry   - HOB elevated     MISAEL on CKD   - Nephro following  - Avoid nephrotoxins  - Trend Cr  - Monitor electrolytes     CHF   - LE edema -Improving, s/p Bumex gtt now on PO Torsemide   - Pt with elevated serum bicarbonate but with mixed disorder (respiratory acidosis and metabolic alkalosis with overall acidemia on blood gas)  - heart failure closely following   - strict I/Os, daily weights     Chronic atrial fibrillation  - Rate controlled- heparin gtt bridging to Coumadin       - Clinical findings, labs, telemetry, and EKG reviewed with attending Dr. Newman, deffered [facility], and continue BIPAP, awaiting palliative care consult.   Writer discussed findings and plan of care with patient's daughter Tayla [phone] this evening, daughter confirms that they want patient FULL CODE, and to continue with medical management/bipap.   Discussed with RN and attending, Will monitor patient closely.     Humberto Rodrigues, [facility] Medicine ACP  Pg [phone]     medium complexity/ risk (45 min)

## 2023-03-01 DIAGNOSIS — Z51.5 ENCOUNTER FOR PALLIATIVE CARE: ICD-10-CM

## 2023-03-01 DIAGNOSIS — Z71.89 OTHER SPECIFIED COUNSELING: ICD-10-CM

## 2023-03-01 LAB
ANION GAP SERPL CALC-SCNC: 11 MMOL/L — SIGNIFICANT CHANGE UP (ref 7–14)
APTT BLD: 60.9 SEC — HIGH (ref 27–36.3)
APTT BLD: 63.9 SEC — HIGH (ref 27–36.3)
APTT BLD: 98.1 SEC — HIGH (ref 27–36.3)
BASE EXCESS BLDV CALC-SCNC: 15.8 MMOL/L — HIGH (ref -2–3)
BLOOD GAS VENOUS COMPREHENSIVE RESULT: SIGNIFICANT CHANGE UP
BUN SERPL-MCNC: 114 MG/DL — HIGH (ref 7–23)
CALCIUM SERPL-MCNC: 10.4 MG/DL — SIGNIFICANT CHANGE UP (ref 8.4–10.5)
CHLORIDE BLDV-SCNC: 91 MMOL/L — LOW (ref 96–108)
CHLORIDE SERPL-SCNC: 89 MMOL/L — LOW (ref 98–107)
CO2 BLDV-SCNC: 45.4 MMOL/L — HIGH (ref 22–26)
CO2 SERPL-SCNC: 39 MMOL/L — HIGH (ref 22–31)
CREAT SERPL-MCNC: 3.65 MG/DL — HIGH (ref 0.5–1.3)
EGFR: 12 ML/MIN/1.73M2 — LOW
GAS PNL BLDV: 133 MMOL/L — LOW (ref 136–145)
GLUCOSE BLDC GLUCOMTR-MCNC: 105 MG/DL — HIGH (ref 70–99)
GLUCOSE BLDC GLUCOMTR-MCNC: 106 MG/DL — HIGH (ref 70–99)
GLUCOSE BLDC GLUCOMTR-MCNC: 108 MG/DL — HIGH (ref 70–99)
GLUCOSE BLDC GLUCOMTR-MCNC: 116 MG/DL — HIGH (ref 70–99)
GLUCOSE BLDC GLUCOMTR-MCNC: 127 MG/DL — HIGH (ref 70–99)
GLUCOSE BLDC GLUCOMTR-MCNC: 96 MG/DL — SIGNIFICANT CHANGE UP (ref 70–99)
GLUCOSE BLDV-MCNC: 108 MG/DL — HIGH (ref 70–99)
GLUCOSE SERPL-MCNC: 104 MG/DL — HIGH (ref 70–99)
HCO3 BLDV-SCNC: 43 MMOL/L — HIGH (ref 22–29)
HCT VFR BLD CALC: 27.1 % — LOW (ref 34.5–45)
HCT VFR BLDA CALC: 26 % — LOW (ref 34.5–46.5)
HGB BLD CALC-MCNC: 8.6 G/DL — LOW (ref 11.7–16.1)
HGB BLD-MCNC: 7.9 G/DL — LOW (ref 11.5–15.5)
INR BLD: 2.22 RATIO — HIGH (ref 0.88–1.16)
LACTATE BLDV-MCNC: 0.7 MMOL/L — SIGNIFICANT CHANGE UP (ref 0.5–2)
MAGNESIUM SERPL-MCNC: 2.7 MG/DL — HIGH (ref 1.6–2.6)
MCHC RBC-ENTMCNC: 29.2 GM/DL — LOW (ref 32–36)
MCHC RBC-ENTMCNC: 29.6 PG — SIGNIFICANT CHANGE UP (ref 27–34)
MCV RBC AUTO: 101.5 FL — HIGH (ref 80–100)
NRBC # BLD: 0 /100 WBCS — SIGNIFICANT CHANGE UP (ref 0–0)
NRBC # FLD: 0 K/UL — SIGNIFICANT CHANGE UP (ref 0–0)
PCO2 BLDV: 73 MMHG — HIGH (ref 39–52)
PH BLDV: 7.38 — SIGNIFICANT CHANGE UP (ref 7.32–7.43)
PHOSPHATE SERPL-MCNC: 4.9 MG/DL — HIGH (ref 2.5–4.5)
PLATELET # BLD AUTO: 272 K/UL — SIGNIFICANT CHANGE UP (ref 150–400)
PO2 BLDV: 97 MMHG — HIGH (ref 25–45)
POTASSIUM BLDV-SCNC: 4.1 MMOL/L — SIGNIFICANT CHANGE UP (ref 3.5–5.1)
POTASSIUM SERPL-MCNC: 4.2 MMOL/L — SIGNIFICANT CHANGE UP (ref 3.5–5.3)
POTASSIUM SERPL-SCNC: 4.2 MMOL/L — SIGNIFICANT CHANGE UP (ref 3.5–5.3)
PROTHROM AB SERPL-ACNC: 26 SEC — HIGH (ref 10.5–13.4)
RBC # BLD: 2.67 M/UL — LOW (ref 3.8–5.2)
RBC # FLD: 14.4 % — SIGNIFICANT CHANGE UP (ref 10.3–14.5)
SAO2 % BLDV: 98.5 % — HIGH (ref 67–88)
SODIUM SERPL-SCNC: 139 MMOL/L — SIGNIFICANT CHANGE UP (ref 135–145)
WBC # BLD: 7.81 K/UL — SIGNIFICANT CHANGE UP (ref 3.8–10.5)
WBC # FLD AUTO: 7.81 K/UL — SIGNIFICANT CHANGE UP (ref 3.8–10.5)

## 2023-03-01 PROCEDURE — 99223 1ST HOSP IP/OBS HIGH 75: CPT

## 2023-03-01 PROCEDURE — 99497 ADVNCD CARE PLAN 30 MIN: CPT | Mod: 25

## 2023-03-01 PROCEDURE — 99232 SBSQ HOSP IP/OBS MODERATE 35: CPT

## 2023-03-01 RX ORDER — WARFARIN SODIUM 2.5 MG/1
5 TABLET ORAL ONCE
Refills: 0 | Status: COMPLETED | OUTPATIENT
Start: 2023-03-01 | End: 2023-03-01

## 2023-03-01 RX ADMIN — Medication 5 UNIT(S): at 12:36

## 2023-03-01 RX ADMIN — BUDESONIDE AND FORMOTEROL FUMARATE DIHYDRATE 2 PUFF(S): 160; 4.5 AEROSOL RESPIRATORY (INHALATION) at 08:51

## 2023-03-01 RX ADMIN — HEPARIN SODIUM 400 UNIT(S)/HR: 5000 INJECTION INTRAVENOUS; SUBCUTANEOUS at 11:04

## 2023-03-01 RX ADMIN — Medication 5 UNIT(S): at 08:51

## 2023-03-01 RX ADMIN — Medication 1 SPRAY(S): at 23:04

## 2023-03-01 RX ADMIN — Medication 1 SPRAY(S): at 12:19

## 2023-03-01 RX ADMIN — WARFARIN SODIUM 5 MILLIGRAM(S): 2.5 TABLET ORAL at 23:06

## 2023-03-01 RX ADMIN — Medication 1 APPLICATION(S): at 12:25

## 2023-03-01 RX ADMIN — Medication 5 MILLIGRAM(S): at 12:18

## 2023-03-01 RX ADMIN — Medication 1 SPRAY(S): at 18:02

## 2023-03-01 RX ADMIN — Medication 325 MILLIGRAM(S): at 12:19

## 2023-03-01 RX ADMIN — SENNA PLUS 2 TABLET(S): 8.6 TABLET ORAL at 23:05

## 2023-03-01 RX ADMIN — ISOSORBIDE MONONITRATE 30 MILLIGRAM(S): 60 TABLET, EXTENDED RELEASE ORAL at 12:18

## 2023-03-01 RX ADMIN — HEPARIN SODIUM 400 UNIT(S)/HR: 5000 INJECTION INTRAVENOUS; SUBCUTANEOUS at 03:08

## 2023-03-01 RX ADMIN — Medication 25 MILLIGRAM(S): at 12:19

## 2023-03-01 RX ADMIN — Medication 5 UNIT(S): at 18:02

## 2023-03-01 RX ADMIN — HEPARIN SODIUM 400 UNIT(S)/HR: 5000 INJECTION INTRAVENOUS; SUBCUTANEOUS at 08:24

## 2023-03-01 RX ADMIN — BUDESONIDE AND FORMOTEROL FUMARATE DIHYDRATE 2 PUFF(S): 160; 4.5 AEROSOL RESPIRATORY (INHALATION) at 23:04

## 2023-03-01 RX ADMIN — LIDOCAINE 1 PATCH: 4 CREAM TOPICAL at 18:06

## 2023-03-01 RX ADMIN — Medication 1 SPRAY(S): at 08:51

## 2023-03-01 RX ADMIN — Medication 1 APPLICATION(S): at 23:51

## 2023-03-01 RX ADMIN — SIMVASTATIN 10 MILLIGRAM(S): 20 TABLET, FILM COATED ORAL at 23:06

## 2023-03-01 RX ADMIN — Medication 20 UNIT(S): at 23:06

## 2023-03-01 RX ADMIN — HEPARIN SODIUM 400 UNIT(S)/HR: 5000 INJECTION INTRAVENOUS; SUBCUTANEOUS at 21:14

## 2023-03-01 RX ADMIN — Medication 25 MILLIGRAM(S): at 23:06

## 2023-03-01 RX ADMIN — Medication 1 APPLICATION(S): at 18:02

## 2023-03-01 RX ADMIN — LIDOCAINE 1 PATCH: 4 CREAM TOPICAL at 12:16

## 2023-03-01 NOTE — CONSULT NOTE ADULT - CONVERSATION DETAILS
Spoke with patient regarding her deteriorating condition. Patient has good understanding of her basic medical issues able to list she has HF, COPD, renal failure, she says "the whole nine yards." She says she feels weak and doubts whether she can do rehab. Patient herself says "let me go." Addressed her thoughts on end of life decisions. She says she is already a DNR and a form was completed and her daughter has a copy. Checked patient's file in this time, no MOLST found. Told patient that there was no form. Patient was upset that the MOLST form was seemingly lost. Discussed about allowing for natural death and that she would not want to be connected to machines. Based on EMR documentation, appeared to be confusion with medical team and daughter during RRT yesterday, unclear if daughter aware of patient's wishes. Called daughter with patient's phone for three of us to speak. Discussed with daughter that patient wishes to die naturally not connected to machines, daughter says she has known about patient's wishes and did not disagree. Explained that another form would be completed again to reflect patient's wishes. Daughter was confused by terminology of DNR/DNI however educated DNR/DNI does reflects patient's wishes to allow for natural death. Also discussed that patient was a candidate for hospice. Explained hospice philosophy and services. Patient and daughter were agreeable to a home hospice referral. Patient was appreciative of the discussion. Spoke with patient regarding her deteriorating condition. Patient has good understanding of her basic medical issues able to list she has HF, COPD, renal failure, she says "the whole nine yards." She says she feels weak and doubts whether she can do rehab. Patient herself says "let me go." Addressed her thoughts on end of life decisions. She says she is already a DNR and a form was completed and her daughter has a copy. Checked patient's file in this time, no MOLST found. Told patient that there was no form. Patient was upset that the MOLST form was seemingly lost. Discussed about allowing for natural death and that she would not want to be connected to machines. Based on EMR documentation, appeared to be confusion with medical team and daughter during RRT yesterday, unclear if daughter aware of patient's wishes. Called daughter with patient's phone for three of us to speak. Discussed with daughter that patient wishes to die naturally not connected to machines, daughter says she has known about patient's wishes and did not disagree. Explained that another form would be completed again to reflect patient's wishes. Daughter was confused by terminology of DNR/DNI however educated DNR/DNI does reflects patient's wishes to allow for natural death. Also discussed that patient had life expectancy of less than 6 months and was a candidate for hospice. Explained hospice philosophy and services. Patient and daughter were agreeable to a home hospice referral. Patient was appreciative of the discussion.

## 2023-03-01 NOTE — CONSULT NOTE ADULT - PROBLEM SELECTOR RECOMMENDATION 9
Bumex 1mg/hr   Hydralazine 50mg TID  Strict I/O  Daily standing weights  Monitor lytes replete K>4.0 and Mg>2.0   Trend SCr  Repeat BNP in AM  Management per primary team   Appreciate Nephrology recommendations (workup for L. renal mass)  HF counseling  provided and GOC discussion initiated   Pending final recommendations from HF attending
- Patient has worsening hypoxemic and hypercarbic respiratory failure with morbid obesity MIGUEL/COPD/HF, also renal failure   - on bipap at night, prn   - comfortable at this time   - if respiratory distress can start IV dilaudid 0.5 mg q2h PRN for respiratory distress

## 2023-03-01 NOTE — CONSULT NOTE ADULT - PROBLEM SELECTOR RECOMMENDATION 4
- Palliative consulted for complex medical decision making in the setting of serious illness.     In the event of worsening symptoms, please contact the Palliative Medicine team via pager (if the patient is at [facility] #[phone] or if the patient is at [facility] #[phone]) The Geriatric and Palliative Medicine service has coverage 24 hours a day/ 7 days a week to provide medical recommendations regarding symptom management needs via telephone

## 2023-03-01 NOTE — CONSULT NOTE ADULT - CONSULT REASON
episode of lethargy
HF
complex medical decision making in the setting of serious illness
Elevated Scr
Dyspnea

## 2023-03-01 NOTE — CONSULT NOTE ADULT - ASSESSMENT
Patient is a 73 y/o female with DM/HTN/MIGUEL/COPD/CHF/CKD/Afib/Pulm HTN/Mitral and Tricuspid Valve replacement (mechanical valve on warfarin), chronic respiratory failure 3L home O2 presents to ED for evaluation of epistaxis. Patient was recently here for COPD exacerbation and discharged home with therapeutic INR. She was in a relatively baseline state of health and uses 3L NC at home until this PM she started having diffuse epistaxis for about 30 minutes. She states it was jacey blood and she used up almost 1 roll of paper towels. She states she has been taking her medication consistently and able to state that she has taken her warfarin 5mg this AM. Denies chest pain, dyspnea, diarrhea, fever, chills, cough, URI symptoms, headaches. She does have chronic dizziness. Denies melena or hematochezia. In the ED, NC3L, 168/69. Epistaxis stopped. INR subtherapeutic to 1~. Hgb to ~8 from 10 from last admission. Epistaxis revolved. Hospital course complicated with a prolonged hospitalization with worsening respiratory status with hypoxemic and hypercarbic respiratory failure, on bipap at night and renal failure. Imaging shows L renal mass 3.2 cm, suspicious for malignancy. On 2/28 had RRT for unresponsiveness, placed on bipap. Palliative consulted for complex medical decision making in the setting of serious illness.

## 2023-03-01 NOTE — CONSULT NOTE ADULT - NS ATTEST RISK PROBLEM GEN_ALL_CORE FT
Patient is seriously ill with multiple life limiting comorbidities including MIGUEL, HF, COPD, worsening renal failure  Now with worsening acute on chronic hypoxemic and hypercarbic respiratory failure on bipap   High risk of morbidity and mortality   Decision for DNR/DNI

## 2023-03-01 NOTE — PROVIDER CONTACT NOTE (OTHER) - RECOMMENDATIONS
ACP Darlyn Jurado notified
Juice and pretzels with frequent blood glucose check
ACP PATRICIA STOCK notified
Inform provider via text page.
Notify provider
ACP Maritza Cornejo notified
MALICK Jurado notified
Provider to further assess
Thee Cordero notified
ACP Chantal Salas notified
EKG
Notify provider. Follow heparin nomogram

## 2023-03-01 NOTE — PROGRESS NOTE ADULT - SUBJECTIVE AND OBJECTIVE BOX
[facility] NEPHROLOGY- PROGRESS NOTE    72 year old Female with history of COPD, CHF presents with weakness. Nephrology consulted for elevated Scr.    RRT called overnight for hypoxia.    REVIEW OF SYSTEMS:  Gen: no fevers  Cards: no chest pain  Resp: + dyspnea  GI: no nausea or vomiting or diarrhea  Vascular: + LE edema improving    No Known Allergies      Hospital Medications: Medications reviewed      VITALS:  T(F): 97.9 (03-01-23 @ 11:57), Max: 98 (02-28-23 @ 19:00)  HR: 62 (03-01-23 @ 11:57)  BP: 117/52 (03-01-23 @ 11:57)  RR: 19 (03-01-23 @ 11:57)  SpO2: 99% (03-01-23 @ 11:57)  Wt(kg): --        PHYSICAL EXAM:    Gen: NAD, calm  Cards: RRR, +S1/S2, no M/G/R  Resp: + tachypnea  GI: soft, + TTP in epigastric area, ND, NABS  Vascular: no LE edema      LABS:  03-01    139  |  89<L>  |  114<H>  ----------------------------<  104<H>  4.2   |  39<H>  |  3.65<H>    Ca    10.4      01 Mar 2023 06:00  Phos  4.9     03-01  Mg     2.70     03-01    TPro  8.3  /  Alb  4.3  /  TBili  0.5  /  DBili      /  AST  15  /  ALT  11  /  AlkPhos  80  02-28    Creatinine Trend: 3.65 <--, 3.52 <--, 3.40 <--, 3.55 <--, 3.56 <--, 3.41 <--, 3.09 <--, 2.83 <--                        7.9    7.81  )-----------( 272      ( 01 Mar 2023 06:00 )             27.1     Urine Studies:      < from: Xray Chest 1 View- PORTABLE-Urgent (Xray Chest 1 View- PORTABLE-Urgent .) (02.28.23 @ 17:51) >  IMPRESSION:  Stable pulmonary edema and left pleural effusion.    --- End of Report ---      < end of copied text >

## 2023-03-01 NOTE — PROGRESS NOTE ADULT - SUBJECTIVE AND OBJECTIVE BOX
SUBJECTIVE / OVERNIGHT EVENTS:pt seen and examined, c/o shortness of breath   03-01-23    MEDICATIONS  (STANDING):  aMIOdarone    Tablet 200 milliGRAM(s) Oral daily  budesonide 160 MICROgram(s)/formoterol 4.5 MICROgram(s) Inhaler 2 Puff(s) Inhalation two times a day  dextrose 5%. 1000 milliLiter(s) (50 mL/Hr) IV Continuous <Continuous>  dextrose 5%. 1000 milliLiter(s) (100 mL/Hr) IV Continuous <Continuous>  dextrose 50% Injectable 25 Gram(s) IV Push once  dextrose 50% Injectable 12.5 Gram(s) IV Push once  dextrose 50% Injectable 25 Gram(s) IV Push once  ferrous    sulfate 325 milliGRAM(s) Oral daily  glucagon  Injectable 1 milliGRAM(s) IntraMuscular once  heparin  Infusion.  Unit(s)/Hr (20 mL/Hr) IV Continuous <Continuous>  hydrALAZINE 25 milliGRAM(s) Oral three times a day  insulin detemir injectable (LEVEMIR) 20 Unit(s) SubCutaneous at bedtime  insulin lispro (ADMELOG) corrective regimen sliding scale   SubCutaneous three times a day before meals  insulin lispro (ADMELOG) corrective regimen sliding scale   SubCutaneous at bedtime  insulin lispro Injectable (ADMELOG) 5 Unit(s) SubCutaneous three times a day before meals  isosorbide   mononitrate ER Tablet (IMDUR) 30 milliGRAM(s) Oral daily  pantoprazole    Tablet 40 milliGRAM(s) Oral before breakfast  petrolatum white Ointment 1 Application(s) Topical every 6 hours  polyethylene glycol 3350 17 Gram(s) Oral daily  predniSONE   Tablet 5 milliGRAM(s) Oral daily  senna 2 Tablet(s) Oral at bedtime  simvastatin 10 milliGRAM(s) Oral at bedtime  sodium chloride 0.65% Nasal 1 Spray(s) Both Nostrils every 4 hours  torsemide 40 milliGRAM(s) Oral daily  warfarin 5 milliGRAM(s) Oral once    MEDICATIONS  (PRN):  acetaminophen    Suspension .. 650 milliGRAM(s) Oral every 6 hours PRN Temp greater or equal to 38C (100.4F), Mild Pain (1 - 3), Moderate Pain (4 - 6)  albuterol    90 MICROgram(s) HFA Inhaler 2 Puff(s) Inhalation every 6 hours PRN Bronchospasm  aluminum hydroxide/magnesium hydroxide/simethicone Suspension 30 milliLiter(s) Oral every 4 hours PRN Dyspepsia  bisacodyl 5 milliGRAM(s) Oral every 12 hours PRN Constipation  dextrose Oral Gel 15 Gram(s) Oral once PRN Blood Glucose LESS THAN 70 milliGRAM(s)/deciliter  heparin   Injectable 9000 Unit(s) IV Push every 6 hours PRN For aPTT less than 40  heparin   Injectable 4500 Unit(s) IV Push every 6 hours PRN For aPTT between 40 - 57  lidocaine   4% Patch 1 Patch Transdermal daily PRN pain  melatonin 3 milliGRAM(s) Oral at bedtime PRN Insomnia  ondansetron Injectable 4 milliGRAM(s) IV Push three times a day PRN Nausea and/or Vomiting    Vital Signs Last 24 Hrs  T(C): 36.7 (03-01-23 @ 21:36), Max: 37 (03-01-23 @ 14:28)  T(F): 98 (03-01-23 @ 21:36), Max: 98.6 (03-01-23 @ 14:28)  HR: 59 (03-01-23 @ 21:36) (55 - 69)  BP: 128/61 (03-01-23 @ 21:36) (117/52 - 130/50)  BP(mean): --  RR: 18 (03-01-23 @ 21:36) (18 - 20)  SpO2: 97% (03-01-23 @ 21:36) (97% - 100%)              Constitutional: No fever, fatigue  Skin: No rash.  Eyes: No recent vision problems or eye pain.  ENT: No congestion, ear pain, or sore throat.  Cardiovascular: No chest pain or palpation.  Respiratory: sob+  Gastrointestinal: No abdominal pain, nausea, vomiting, or diarrhea.  Genitourinary: No dysuria.  Musculoskeletal: No joint swelling.  Neurologic: No headache.    PHYSICAL EXAM:  GENERAL: NAD  EYES: EOMI, PERRLA  NECK: Supple, No JVD  CHEST/LUNG: dec breath sounds at bases  HEART:  S1 , S2 +  ABDOMEN: soft, bs+  EXTREMITIES:  edema+  NEUROLOGY:alert awake oriented    LABS:  03-01    139  |  89<L>  |  114<H>  ----------------------------<  104<H>  4.2   |  39<H>  |  3.65<H>    Ca    10.4      01 Mar 2023 06:00  Phos  4.9     03-01  Mg     2.70     03-01    TPro  8.3  /  Alb  4.3  /  TBili  0.5  /  DBili      /  AST  15  /  ALT  11  /  AlkPhos  80  02-28    Creatinine Trend: 3.65 <--, 3.52 <--, 3.40 <--, 3.55 <--, 3.56 <--, 3.41 <--, 3.09 <--, 2.83 <--                        7.9    7.81  )-----------( 272      ( 01 Mar 2023 06:00 )             27.1     Urine Studies:            LIVER FUNCTIONS - ( 28 Feb 2023 17:00 )  Alb: 4.3 g/dL / Pro: 8.3 g/dL / ALK PHOS: 80 U/L / ALT: 11 U/L / AST: 15 U/L / GGT: x           PT/INR - ( 01 Mar 2023 06:00 )   PT: 26.0 sec;   INR: 2.22 ratio         PTT - ( 01 Mar 2023 09:09 )  PTT:60.9 sec  Imaging Personally Reviewed:yes    Consultant(s) Notes Reviewed:  yes    Care Discussed with Consultants/Other Providers:yes

## 2023-03-01 NOTE — PROGRESS NOTE ADULT - SUBJECTIVE AND OBJECTIVE BOX
PATIENT SEEN AND EXAMINED ON :- 3/1/23  DATE OF SERVICE:    3/1/23         Interim events noted,Labs ,Radiological studies and Cardiology tests reviewed .      PMH -reviewed admission note, no change since admission  Heart failure: acute [ ] chronic [ ] acute or chronic [ ] diastolic [ ] systolic [ ] combined systolic and diastolic[ ]  MISAEL: ATN[ ] renal medullary necrosis [ ] CKD I [ ]CKDII [ ]CKD III [ ]CKD IV [ ]CKD V [ ]Other pathological lesions [ ]    MEDICATIONS  (STANDING):  aMIOdarone    Tablet 200 milliGRAM(s) Oral daily  budesonide 160 MICROgram(s)/formoterol 4.5 MICROgram(s) Inhaler 2 Puff(s) Inhalation two times a day  dextrose 5%. 1000 milliLiter(s) (50 mL/Hr) IV Continuous <Continuous>  dextrose 5%. 1000 milliLiter(s) (100 mL/Hr) IV Continuous <Continuous>  dextrose 50% Injectable 25 Gram(s) IV Push once  dextrose 50% Injectable 12.5 Gram(s) IV Push once  dextrose 50% Injectable 25 Gram(s) IV Push once  ferrous    sulfate 325 milliGRAM(s) Oral daily  glucagon  Injectable 1 milliGRAM(s) IntraMuscular once  heparin  Infusion.  Unit(s)/Hr (20 mL/Hr) IV Continuous <Continuous>  hydrALAZINE 25 milliGRAM(s) Oral three times a day  insulin detemir injectable (LEVEMIR) 20 Unit(s) SubCutaneous at bedtime  insulin lispro (ADMELOG) corrective regimen sliding scale   SubCutaneous three times a day before meals  insulin lispro (ADMELOG) corrective regimen sliding scale   SubCutaneous at bedtime  insulin lispro Injectable (ADMELOG) 5 Unit(s) SubCutaneous three times a day before meals  isosorbide   mononitrate ER Tablet (IMDUR) 30 milliGRAM(s) Oral daily  pantoprazole    Tablet 40 milliGRAM(s) Oral before breakfast  petrolatum white Ointment 1 Application(s) Topical every 6 hours  polyethylene glycol 3350 17 Gram(s) Oral daily  predniSONE   Tablet 5 milliGRAM(s) Oral daily  senna 2 Tablet(s) Oral at bedtime  simvastatin 10 milliGRAM(s) Oral at bedtime  sodium chloride 0.65% Nasal 1 Spray(s) Both Nostrils every 4 hours  torsemide 40 milliGRAM(s) Oral daily  warfarin 5 milliGRAM(s) Oral once    MEDICATIONS  (PRN):  acetaminophen    Suspension .. 650 milliGRAM(s) Oral every 6 hours PRN Temp greater or equal to 38C (100.4F), Mild Pain (1 - 3), Moderate Pain (4 - 6)  albuterol    90 MICROgram(s) HFA Inhaler 2 Puff(s) Inhalation every 6 hours PRN Bronchospasm  aluminum hydroxide/magnesium hydroxide/simethicone Suspension 30 milliLiter(s) Oral every 4 hours PRN Dyspepsia  bisacodyl 5 milliGRAM(s) Oral every 12 hours PRN Constipation  dextrose Oral Gel 15 Gram(s) Oral once PRN Blood Glucose LESS THAN 70 milliGRAM(s)/deciliter  heparin   Injectable 9000 Unit(s) IV Push every 6 hours PRN For aPTT less than 40  heparin   Injectable 4500 Unit(s) IV Push every 6 hours PRN For aPTT between 40 - 57  lidocaine   4% Patch 1 Patch Transdermal daily PRN pain  melatonin 3 milliGRAM(s) Oral at bedtime PRN Insomnia  ondansetron Injectable 4 milliGRAM(s) IV Push three times a day PRN Nausea and/or Vomiting              REVIEW OF SYSTEMS:  Constitutional: [ ] fever, [ ]weight loss,  [ ]fatigue  Eyes: [ ] visual changes  Respiratory: [ ]shortness of breath;  [ ] cough, [ ]wheezing, [ ]chills, [ ]hemoptysis  Cardiovascular: [ ] chest pain, [ ]palpitations, [ ]dizziness,  [ ]leg swelling[ ]orthopnea[ ]PND  Gastrointestinal: [ ] abdominal pain, [ ]nausea, [ ]vomiting,  [ ]diarrhea   Genitourinary: [ ] dysuria, [ ] hematuria  Neurologic: [ ] headaches [ ] tremors[ ]weakness  Skin: [ ] itching, [ ]burning, [ ] rashes  Endocrine: [ ] heat or cold intolerance  Musculoskeletal: [ ] joint pain or swelling; [ ] muscle, back, or extremity pain  Psychiatric: [ ] depression, [ ]anxiety, [ ]mood swings, or [ ]difficulty sleeping  Hematologic: [ ] easy bruising, [ ] bleeding gums    [x] All remaining systems negative except as per above.   [ ]Unable to obtain.    Vital Signs Last 24 Hrs  T(C): 36.8 (01 Mar 2023 18:20), Max: 37 (01 Mar 2023 14:28)  T(F): 98.3 (01 Mar 2023 18:20), Max: 98.6 (01 Mar 2023 14:28)  HR: 65 (01 Mar 2023 18:20) (55 - 69)  BP: 130/50 (01 Mar 2023 18:20) (113/55 - 130/50)  BP(mean): --  RR: 20 (01 Mar 2023 18:20) (18 - 20)  SpO2: 99% (01 Mar 2023 18:20) (99% - 100%)    Parameters below as of 01 Mar 2023 18:20  Patient On (Oxygen Delivery Method): nasal cannula  O2 Flow (L/min): 3    I&O's Summary    Orthostatic VS      PHYSICAL EXAM:  General: No acute distress BMI-  HEENT: EOMI, PERRL  Neck: Supple, [ ] JVD  Lungs: Equal air entry bilaterally; [ ] rales [ ] wheezing [ ] rhonchi  Heart: Regular rate and rhythm; [ ] murmur   /6 [ ] systolic [ ] diastolic [ ] radiation[ ] rubs [ ]  gallops  Abdomen: Nontender, bowel sounds present  Extremities: No clubbing, cyanosis, [ ] edema  Nervous system:  Alert & Oriented X3, no focal deficits  Psychiatric: Normal affect  Skin: No rashes or lesions    LABS:  03-01    139  |  89<L>  |  114<H>  ----------------------------<  104<H>  4.2   |  39<H>  |  3.65<H>    Ca    10.4      01 Mar 2023 06:00  Phos  4.9     03-01  Mg     2.70     03-01    TPro  8.3  /  Alb  4.3  /  TBili  0.5  /  DBili  x   /  AST  15  /  ALT  11  /  AlkPhos  80  02-28    Creatinine Trend: 3.65<--, 3.52<--, 3.40<--, 3.55<--, 3.56<--, 3.41<--                        7.9    7.81  )-----------( 272      ( 01 Mar 2023 06:00 )             27.1     PT/INR - ( 01 Mar 2023 06:00 )   PT: 26.0 sec;   INR: 2.22 ratio         PTT - ( 01 Mar 2023 09:09 )  PTT:60.9 sec  Lipid Panel:   Cardiac Enzymes:

## 2023-03-01 NOTE — CHART NOTE - NSCHARTNOTEFT_GEN_A_CORE
Vital Signs Last 24 Hrs  T(C): 36.6 (01 Mar 2023 11:57), Max: 36.7 (28 Feb 2023 19:00)  T(F): 97.9 (01 Mar 2023 11:57), Max: 98 (28 Feb 2023 19:00)  HR: 62 (01 Mar 2023 11:57) (55 - 84)  BP: 117/52 (01 Mar 2023 11:57) (113/55 - 154/57)  BP(mean): --  RR: 19 (01 Mar 2023 11:57) (18 - 24)  SpO2: 99% (01 Mar 2023 11:57) (77% - 100%)    Parameters below as of 01 Mar 2023 11:57  Patient On (Oxygen Delivery Method): nasal cannula  O2 Flow (L/min): 3                          7.9    7.81  )-----------( 272      ( 01 Mar 2023 06:00 )             27.1   03-01    139  |  89<L>  |  114<H>  ----------------------------<  104<H>  4.2   |  39<H>  |  3.65<H>    Ca    10.4      01 Mar 2023 06:00  Phos  4.9     03-01  Mg     2.70     03-01    TPro  8.3  /  Alb  4.3  /  TBili  0.5  /  DBili  x   /  AST  15  /  ALT  11  /  AlkPhos  80  02-28    PT/INR - ( 01 Mar 2023 06:00 )   PT: 26.0 sec;   INR: 2.22 ratio    PTT - ( 01 Mar 2023 09:09 )  PTT:60.9 sec      Results and case discussed with Vital Signs Last 24 Hrs  T(C): 36.6 (01 Mar 2023 11:57), Max: 36.7 (28 Feb 2023 19:00)  T(F): 97.9 (01 Mar 2023 11:57), Max: 98 (28 Feb 2023 19:00)  HR: 62 (01 Mar 2023 11:57) (55 - 84)  BP: 117/52 (01 Mar 2023 11:57) (113/55 - 154/57)  BP(mean): --  RR: 19 (01 Mar 2023 11:57) (18 - 24)  SpO2: 99% (01 Mar 2023 11:57) (77% - 100%)    Parameters below as of 01 Mar 2023 11:57  Patient On (Oxygen Delivery Method): nasal cannula  O2 Flow (L/min): 3                          7.9    7.81  )-----------( 272      ( 01 Mar 2023 06:00 )             27.1   03-01    139  |  89<L>  |  114<H>  ----------------------------<  104<H>  4.2   |  39<H>  |  3.65<H>    Ca    10.4      01 Mar 2023 06:00  Phos  4.9     03-01  Mg     2.70     03-01    TPro  8.3  /  Alb  4.3  /  TBili  0.5  /  DBili  x   /  AST  15  /  ALT  11  /  AlkPhos  80  02-28    PT/INR - ( 01 Mar 2023 06:00 )   PT: 26.0 sec;   INR: 2.22 ratio    PTT - ( 01 Mar 2023 09:09 )  PTT:60.9 sec    Discussed with Nephro Dr. duarte this morning> patient does not want invasive/RHC, spoke with palliative Dr. Cervantes> patient made DNR/DNI. Results and case discussed with Dr. Newman and RN Vital Signs Last 24 Hrs  T(C): 36.6 (01 Mar 2023 11:57), Max: 36.7 (28 Feb 2023 19:00)  T(F): 97.9 (01 Mar 2023 11:57), Max: 98 (28 Feb 2023 19:00)  HR: 62 (01 Mar 2023 11:57) (55 - 84)  BP: 117/52 (01 Mar 2023 11:57) (113/55 - 154/57)  BP(mean): --  RR: 19 (01 Mar 2023 11:57) (18 - 24)  SpO2: 99% (01 Mar 2023 11:57) (77% - 100%)    Parameters below as of 01 Mar 2023 11:57  Patient On (Oxygen Delivery Method): nasal cannula  O2 Flow (L/min): 3                          7.9    7.81  )-----------( 272      ( 01 Mar 2023 06:00 )             27.1   03-01    139  |  89<L>  |  114<H>  ----------------------------<  104<H>  4.2   |  39<H>  |  3.65<H>    Ca    10.4      01 Mar 2023 06:00  Phos  4.9     03-01  Mg     2.70     03-01    TPro  8.3  /  Alb  4.3  /  TBili  0.5  /  DBili  x   /  AST  15  /  ALT  11  /  AlkPhos  80  02-28    PT/INR - ( 01 Mar 2023 06:00 )   PT: 26.0 sec;   INR: 2.22 ratio    PTT - ( 01 Mar 2023 09:09 )  PTT:60.9 sec    Discussed with Nephro Dr. duarte this morning> patient does not want invasive/RHC, spoke with palliative Dr. Cervantes> patient made DNR/DNI. Results and case discussed with Dr. Newman, continue heparin gtt with coumadin 5mg today.

## 2023-03-01 NOTE — CONSULT NOTE ADULT - SUBJECTIVE AND OBJECTIVE BOX
HPI:  73 y/o female with PMHx of Gout, COPD, HTN, DM, CHF, CKD, Afib, Pulm HTN, 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 Patient was recently here for COPD exacerbation and discharged home with therapeutic INR. She was in a relatively baseline state of health and uses 3L NC at home until this PM she started having diffuse epistaxis for about 30 minutes. She states it was jacey blood and she used up almost 1 roll of paper towels. She states she has been taking her medication consistently and able to state that she has taken her warfarin 5mg this AM. Denies chest pain, dyspnea, diarrhea, fever, chills, cough, URI symptoms, headaches. She does have chronic dizziness. Denies melena or hematochezia.  In the ED, NC3L, 168/69. Epistaxis stopped. INR subtherapeutic to 1~. Hgb to ~8 from 10 from last admission. (04 Feb 2023 23:34)    PERTINENT PM/SXH:   Atrial fibrillation    Pulmonary hypertension    MIGUEL (obstructive sleep apnea)    COPD (chronic obstructive pulmonary disease)    CHF (congestive heart failure)    HTN (hypertension)    Gout    Mitral valve replaced    Tricuspid valve replaced    CHF (congestive heart failure)    Hypertension    COPD (chronic obstructive pulmonary disease)    Chronic atrial fibrillation    HTN (hypertension)    COPD, severe    History of mitral valve replacement with mechanical valve      No significant past surgical history    History of mitral valve replacement with mechanical valve    Tricuspid valve replaced    No significant past surgical history      FAMILY HISTORY:  Family history of hypertension (Father, Sibling)    Family history of stroke    Family history of hypertension (Mother)      ------------------------------------------------------------------------------------------------------------  ITEMS NOT CHECKED ARE NOT PRESENT    SOCIAL HISTORY:   Living Situation: [ ]Home  [ ]Long term care  [ ]Rehab [ ]Other  Support:     Substance hx:  [ ]   Tobacco hx:  [ ]   Alcohol hx: [ ]   Family Hx substance abuse [ ]yes [ ]no    Yazdanism/Spirituality:  PCSSQ[Palliative Care Spiritual Screening Question]   Severity (0-10): 0  Score of 4 or > indicate consideration of Chaplaincy referral.  Chaplaincy Referral: [ ] yes [X ] refused [ ] following    Caregiver [place]? : [ ] yes [X ] no [ ] Deferred [ ] Declined               Social work referral [ ] Patient & Family Centered Care Referral [ ]    Anticipatory Grief present?:  [ ] yes [X ] no  [ ] Deferred                    Social work referral [ ] Patient & Family Centered Care Referral [ ]    ------------------------------------------------------------------------------------------------------------    PRESENT SYMPTOMS:  [ ] No     [ ] Unable to self-report      [ ] CPOT (ICU)     [ ] PAINADs     [ ] RDOS    [ ] Yes     Source if other than patient:  [ ]Family   [ ]Team     PAIN:   If blank, patient unable to specify   [ ]yes [ ]no  QOL impact-   Location -                    Aggravating factors -  Quality -  Radiation -  Timing-  Pain at most severe level (0-10 scale):  Pain at minimal acceptable level/Pain Goal (0-10 scale):     SYMPTOMS:   Dyspnea:                           [ ]Mild [ ]Moderate [ ]Severe  Anxiety:                             [ ]Mild [ ]Moderate [ ]Severe  Fatigue:                             [ ]Mild [ ]Moderate [ ]Severe  Nausea/Vomiting:              [ ]Mild [ ]Moderate [ ]Severe  Loss of appetite:                [ ]Mild [ ]Moderate [ ]Severe  Constipation:                     [ ]Mild [ ]Moderate [ ]Severe    Other Symptoms:  [X ]All other review of systems negative     Home Medications for symptoms if any:  I-Stop Reference No:    ------------------------------------------------------------------------------------------------------------    FUNCTIONAL STATUS:     Baseline ADL (prior to admission):  [ ] Independent  [ ] Moderate Assistance [ ] Dependent  Palliative Performance Score:     [ ]PPSV2 < or = to 30%     NUTRITIONAL STATUS:     Protein Calorie Malnutrition Present:   [ ]mild   [ ]moderate   [ ]severe   [ ]poor nutritional intake   [ ]artificial nutrition      Weight:   [ ]underweight (BMI 18.5 or less)   [ ]morbid obesity (BMI 30 or higher)   [ ]anasarca  [ ]significant weight loss     Height (cm): 152.6 (02-20-23 @ 22:00), 162.6 (01-13-23 @ 12:50), 162.6 (11-04-22 @ 17:28)  Weight (kg): 114.7 (02-20-23 @ 22:00), 108.862 (01-13-23 @ 12:50), 112.5 (11-04-22 @ 17:28)  BMI (kg/m2): 49.3 (02-20-23 @ 22:00), 41.2 (01-13-23 @ 12:50), 41.2 (01-13-23 @ 12:50)    ------------------------------------------------------------------------------------------------------------    PRIOR ADVANCE DIRECTIVES:    [ ] DNR/MOLST    [ ] Living Will    [ ] Health Care Proxy(s)    DECISION MAKER(s):  [ ] Patient    [ ] Surrogate(s)  [ ] HCP   [ ] Guardian             ------------------------------------------------------------------------------------------------------------  PHYSICAL EXAM:  Vital Signs Last 24 Hrs  T(C): 36.4 (01 Mar 2023 07:00), Max: 36.7 (28 Feb 2023 19:00)  T(F): 97.5 (01 Mar 2023 07:00), Max: 98 (28 Feb 2023 19:00)  HR: 62 (01 Mar 2023 07:08) (55 - 84)  BP: 118/54 (01 Mar 2023 07:00) (113/55 - 154/57)  BP(mean): --  RR: 20 (01 Mar 2023 07:00) (18 - 24)  SpO2: 99% (01 Mar 2023 07:08) (77% - 100%)    Parameters below as of 01 Mar 2023 07:00  Patient On (Oxygen Delivery Method): BiPAP/CPAP     I&O's Summary      GENERAL:  [ ]Cachexia  [ ] Frail  [ ]Awake  [ ]Oriented x   [ ]Lethargic  [ ]Unarousable  [ ]Verbal  [ ]Non-Verbal    BEHAVIORAL:   [ ] Anxiety  [ ] Delirium [ ] Agitation [ ] Other    HEENT:   [ ]Normal   [ ]Dry mouth   [ ]ET Tube/Trach  [ ]Oral lesions    PULMONARY:   [ ]Clear [ ]Tachypnea  [ ]Audible excessive secretions   [ ]Rhonchi        [ ]Right [ ]Left [ ]Bilateral  [ ]Crackles        [ ]Right [ ]Left [ ]Bilateral  [ ]Wheezing     [ ]Right [ ]Left [ ]Bilateral  [ ]Diminished breath sounds [ ]right [ ]left [ ]bilateral    CARDIOVASCULAR:    [ ]Regular [ ]Irregular [ ]Tachy  [ ]Maxim [ ]Murmur [ ]Other    GASTROINTESTINAL:  [ ]Soft  [ ]Distended   [ ]+BS  [ ]Non tender [ ]Tender  [ ]Other [ ]PEG [ ]OGT/ NGT      GENITOURINARY:  [ ]Normal [ ] Incontinent   [ ]Oliguria/Anuria   [ ]Junior    MUSCULOSKELETAL:   [ ]Normal   [ ]Weakness  [ ]Bed/Wheelchair bound [ ]Edema    NEUROLOGIC:   [ ]No focal deficits  [ ]Cognitive impairment  [ ]Dysphagia [ ]Dysarthria [ ]Paresis [ ]Other     SKIN:   [ ]Normal  [ ]Rash  [ ]Other  [ ]Pressure ulcer(s)       Present on admission [ ]y [ ]n    ------------------------------------------------------------------------------------------------------------    LABS:                        7.9    7.81  )-----------( 272      ( 01 Mar 2023 06:00 )             27.1   03-01    139  |  89<L>  |  114<H>  ----------------------------<  104<H>  4.2   |  39<H>  |  3.65<H>    Ca    10.4      01 Mar 2023 06:00  Phos  4.9     03-01  Mg     2.70     03-01    TPro  8.3  /  Alb  4.3  /  TBili  0.5  /  DBili  x   /  AST  15  /  ALT  11  /  AlkPhos  80  02-28  PT/INR - ( 01 Mar 2023 06:00 )   PT: 26.0 sec;   INR: 2.22 ratio         PTT - ( 01 Mar 2023 09:09 )  PTT:60.9 sec        ------------------------------------------------------------------------------------------------------------    RADIOLOGY & ADDITIONAL STUDIES:      ------------------------------------------------------------------------------------------------------------    ALLERGIES:  Allergies    No Known Allergies    Intolerances      MEDICATIONS  (STANDING):  aMIOdarone    Tablet 200 milliGRAM(s) Oral daily  budesonide 160 MICROgram(s)/formoterol 4.5 MICROgram(s) Inhaler 2 Puff(s) Inhalation two times a day  dextrose 5%. 1000 milliLiter(s) (50 mL/Hr) IV Continuous <Continuous>  dextrose 5%. 1000 milliLiter(s) (100 mL/Hr) IV Continuous <Continuous>  dextrose 50% Injectable 25 Gram(s) IV Push once  dextrose 50% Injectable 12.5 Gram(s) IV Push once  dextrose 50% Injectable 25 Gram(s) IV Push once  ferrous    sulfate 325 milliGRAM(s) Oral daily  glucagon  Injectable 1 milliGRAM(s) IntraMuscular once  heparin  Infusion.  Unit(s)/Hr (20 mL/Hr) IV Continuous <Continuous>  hydrALAZINE 25 milliGRAM(s) Oral three times a day  insulin detemir injectable (LEVEMIR) 20 Unit(s) SubCutaneous at bedtime  insulin lispro (ADMELOG) corrective regimen sliding scale   SubCutaneous three times a day before meals  insulin lispro (ADMELOG) corrective regimen sliding scale   SubCutaneous at bedtime  insulin lispro Injectable (ADMELOG) 5 Unit(s) SubCutaneous three times a day before meals  isosorbide   mononitrate ER Tablet (IMDUR) 30 milliGRAM(s) Oral daily  pantoprazole    Tablet 40 milliGRAM(s) Oral before breakfast  petrolatum white Ointment 1 Application(s) Topical every 6 hours  polyethylene glycol 3350 17 Gram(s) Oral daily  predniSONE   Tablet 5 milliGRAM(s) Oral daily  senna 2 Tablet(s) Oral at bedtime  simvastatin 10 milliGRAM(s) Oral at bedtime  sodium chloride 0.65% Nasal 1 Spray(s) Both Nostrils every 4 hours  torsemide 40 milliGRAM(s) Oral daily  warfarin 5 milliGRAM(s) Oral once    MEDICATIONS  (PRN):  acetaminophen    Suspension .. 650 milliGRAM(s) Oral every 6 hours PRN Temp greater or equal to 38C (100.4F), Mild Pain (1 - 3), Moderate Pain (4 - 6)  albuterol    90 MICROgram(s) HFA Inhaler 2 Puff(s) Inhalation every 6 hours PRN Bronchospasm  aluminum hydroxide/magnesium hydroxide/simethicone Suspension 30 milliLiter(s) Oral every 4 hours PRN Dyspepsia  bisacodyl 5 milliGRAM(s) Oral every 12 hours PRN Constipation  dextrose Oral Gel 15 Gram(s) Oral once PRN Blood Glucose LESS THAN 70 milliGRAM(s)/deciliter  heparin   Injectable 9000 Unit(s) IV Push every 6 hours PRN For aPTT less than 40  heparin   Injectable 4500 Unit(s) IV Push every 6 hours PRN For aPTT between 40 - 57  lidocaine   4% Patch 1 Patch Transdermal daily PRN pain  melatonin 3 milliGRAM(s) Oral at bedtime PRN Insomnia  ondansetron Injectable 4 milliGRAM(s) IV Push three times a day PRN Nausea and/or Vomiting      ------------------------------------------------------------------------------------------------------------    CRITICAL CARE:  [ ]Shock Present  [ ]Septic [ ]Cardiogenic [ ]Neurologic [ ]Hypovolemic [ ] Undifferentiated/Mixed   [ ]Vasopressors [ ]Inotropes     [ ]Respiratory failure present: [ ]Acute  [ ]Chronic [ ]Hypoxic  [ ]Hypercarbic   [ ]Mechanical ventilation   [ ]Non-invasive ventilatory support   [ ]High flow    [ ]Non-rebreather/Venti     [ ]Other organ failure     Other REFERRALS:  [ ]Hospice  [ ]Child Life  [ ]Social Work  [ ]Case management [ ]Holistic Therapy    HPI:  Patient is a 71 y/o female with DM/HTN/MIGUEL/COPD/CHF/CKD/Afib/Pulm HTN/Mitral and Tricuspid Valve replacement (mechanical valve on warfarin), chronic respiratory failure 3L home O2 presents to ED for evaluation of epistaxis. Patient was recently here for COPD exacerbation and discharged home with therapeutic INR. She was in a relatively baseline state of health and uses 3L NC at home until this PM she started having diffuse epistaxis for about 30 minutes. She states it was jacey blood and she used up almost 1 roll of paper towels. She states she has been taking her medication consistently and able to state that she has taken her warfarin 5mg this AM. Denies chest pain, dyspnea, diarrhea, fever, chills, cough, URI symptoms, headaches. She does have chronic dizziness. Denies melena or hematochezia. In the ED, NC3L, 168/69. Epistaxis stopped. INR subtherapeutic to 1~. Hgb to ~8 from 10 from last admission. Epistaxis revolved. Hospital course complicated with a prolonged hospitalization with worsening respiratory status with hypoxemic and hypercarbic respiratory failure, on bipap at night and renal failure. Imaging shows L renal mass 3.2 cm, suspicious for malignancy. On 2/28 had RRT for unresponsiveness, placed on bipap. Palliative consulted for complex medical decision making in the setting of serious illness.     Patient seen this AM, comfortable on nasal cannula, A&Ox3, pleasant. See below for GOC.     PERTINENT PM/SXH:   Atrial fibrillation    Pulmonary hypertension    MIGUEL (obstructive sleep apnea)    COPD (chronic obstructive pulmonary disease)    CHF (congestive heart failure)    HTN (hypertension)    Gout    Mitral valve replaced    Tricuspid valve replaced    CHF (congestive heart failure)    Hypertension    COPD (chronic obstructive pulmonary disease)    Chronic atrial fibrillation    HTN (hypertension)    COPD, severe    History of mitral valve replacement with mechanical valve      No significant past surgical history    History of mitral valve replacement with mechanical valve    Tricuspid valve replaced    No significant past surgical history      FAMILY HISTORY:  Family history of hypertension (Father, Sibling)    Family history of stroke    Family history of hypertension (Mother)    ------------------------------------------------------------------------------------------------------------  ITEMS NOT CHECKED ARE NOT PRESENT    SOCIAL HISTORY:   Living Situation: [X ]Home  [ ]Long term care  [ ]Rehab [ ]Other  Support: Lives with  and daughter Chata/Fabio?     Substance hx:  [ ]   Tobacco hx:  [ ]   Alcohol hx: [ ]   Family Hx substance abuse [ ]yes [ ]no    Taoism/Spirituality: Spiritism/Scientologist   PCSSQ[Palliative Care Spiritual Screening Question]   Severity (0-10): 0  Score of 4 or > indicate consideration of Chaplaincy referral.  Chaplaincy Referral: [ ] yes [ ] refused [X ] following    Caregiver [place]? : [ ] yes [X ] no [ ] Deferred [ ] Declined               Social work referral [ ] Patient & Family Centered Care Referral [ ]    Anticipatory Grief present?:  [ ] yes [X ] no  [ ] Deferred                    Social work referral [ ] Patient & Family Centered Care Referral [ ]    ------------------------------------------------------------------------------------------------------------    PRESENT SYMPTOMS:  [X ] No     [ ] Unable to self-report      [ ] CPOT (ICU)     [ ] PAINADs     [ ] RDOS    [ ] Yes     Source if other than patient:  [ ]Family   [ ]Team     PAIN:   If blank, patient unable to specify   [ ]yes [ ]no  QOL impact-   Location -                    Aggravating factors -  Quality -  Radiation -  Timing-  Pain at most severe level (0-10 scale):  Pain at minimal acceptable level/Pain Goal (0-10 scale):     SYMPTOMS:   Dyspnea:                           [X ]Mild [ ]Moderate [ ]Severe  Anxiety:                             [ ]Mild [ ]Moderate [ ]Severe  Fatigue:                             [ ]Mild [X ]Moderate [ ]Severe  Nausea/Vomiting:              [ ]Mild [ ]Moderate [ ]Severe  Loss of appetite:                [ ]Mild [ ]Moderate [X ]Severe  Constipation:                     [ ]Mild [ ]Moderate [ ]Severe    Other Symptoms:  [X ]All other review of systems negative     Home Medications for symptoms if any:  I-Stop Reference No:    ------------------------------------------------------------------------------------------------------------    FUNCTIONAL STATUS:     Baseline ADL (prior to admission):  [ ] Independent  [ ] Moderate Assistance [X ] Dependent  Palliative Performance Score: 30%     [X ]PPSV2 < or = to 30%     NUTRITIONAL STATUS:     Protein Calorie Malnutrition Present:   [ ]mild   [ ]moderate   [ ]severe   [X ]poor nutritional intake   [ ]artificial nutrition      Weight:   [ ]underweight (BMI 18.5 or less)   [X ]morbid obesity (BMI 30 or higher)   [ ]anasarca  [ ]significant weight loss     Height (cm): 152.6 (02-20-23 @ 22:00), 162.6 (01-13-23 @ 12:50), 162.6 (11-04-22 @ 17:28)  Weight (kg): 114.7 (02-20-23 @ 22:00), 108.862 (01-13-23 @ 12:50), 112.5 (11-04-22 @ 17:28)  BMI (kg/m2): 49.3 (02-20-23 @ 22:00), 41.2 (01-13-23 @ 12:50), 41.2 (01-13-23 @ 12:50)    ------------------------------------------------------------------------------------------------------------    PRIOR ADVANCE DIRECTIVES:    [ ] DNR/MOLST    [ ] Living Will    [ ] Health Care Proxy(s)    DECISION MAKER(s):  [X ] Patient    [ ] Surrogate(s)  [ ] HCP   [ ] Guardian             ------------------------------------------------------------------------------------------------------------  PHYSICAL EXAM:  Vital Signs Last 24 Hrs  T(C): 36.4 (01 Mar 2023 07:00), Max: 36.7 (28 Feb 2023 19:00)  T(F): 97.5 (01 Mar 2023 07:00), Max: 98 (28 Feb 2023 19:00)  HR: 62 (01 Mar 2023 07:08) (55 - 84)  BP: 118/54 (01 Mar 2023 07:00) (113/55 - 154/57)  BP(mean): --  RR: 20 (01 Mar 2023 07:00) (18 - 24)  SpO2: 99% (01 Mar 2023 07:08) (77% - 100%)    Parameters below as of 01 Mar 2023 07:00  Patient On (Oxygen Delivery Method): BiPAP/CPAP     I&O's Summary      GENERAL:  [ ]Cachexia  [X ] Frail  [X ]Awake  [X ]Oriented x 3   [ ]Lethargic  [ ]Unarousable  [ ]Verbal  [ ]Non-Verbal    BEHAVIORAL:   [ ] Anxiety  [ ] Delirium [ ] Agitation [ ] Other    HEENT:   [X ]Normal   [ ]Dry mouth   [ ]ET Tube/Trach  [ ]Oral lesions    PULMONARY:   [ ]Clear [ ]Tachypnea  [ ]Audible excessive secretions   [ ]Rhonchi        [ ]Right [ ]Left [ ]Bilateral  [ ]Crackles        [ ]Right [ ]Left [ ]Bilateral  [ ]Wheezing     [ ]Right [ ]Left [ ]Bilateral  [X ]Diminished breath sounds [ ]right [ ]left [X ]bilateral    CARDIOVASCULAR:    [X ]Regular [ ]Irregular [ ]Tachy  [ ]Maxim [ ]Murmur [ ]Other    GASTROINTESTINAL:  [X ]Soft  [ ]Distended   [X ]+BS  [X ]Non tender [ ]Tender  [ ]Other [ ]PEG [ ]OGT/ NGT      GENITOURINARY:  [ ]Normal [X ] Incontinent   [ ]Oliguria/Anuria   [ ]Junior    MUSCULOSKELETAL:   [ ]Normal   [ ]Weakness  [X ]Bed/Wheelchair bound [X ]Edema    NEUROLOGIC:   [X ]No focal deficits  [ ]Cognitive impairment  [ ]Dysphagia [ ]Dysarthria [ ]Paresis [ ]Other     SKIN:   [X ]Normal  [ ]Rash  [ ]Other  [ ]Pressure ulcer(s)       Present on admission [ ]y [ ]n    ------------------------------------------------------------------------------------------------------------    LABS:                        7.9    7.81  )-----------( 272      ( 01 Mar 2023 06:00 )             27.1   03-01    139  |  89<L>  |  114<H>  ----------------------------<  104<H>  4.2   |  39<H>  |  3.65<H>    Ca    10.4      01 Mar 2023 06:00  Phos  4.9     03-01  Mg     2.70     03-01    TPro  8.3  /  Alb  4.3  /  TBili  0.5  /  DBili  x   /  AST  15  /  ALT  11  /  AlkPhos  80  02-28  PT/INR - ( 01 Mar 2023 06:00 )   PT: 26.0 sec;   INR: 2.22 ratio         PTT - ( 01 Mar 2023 09:09 )  PTT:60.9 sec    ------------------------------------------------------------------------------------------------------------    RADIOLOGY & ADDITIONAL STUDIES:    < from: TTE with Doppler (w/Cont) (02.23.23 @ 16:17) >  CONCLUSIONS:  Technically very difficult study.  1. Mechanical prosthetic mitral valve replacement. Minimal  mitral regurgitation. Mean transmitral valve gradient  equals 5 mm Hg, which is probably normal in the setting of  a prosthetic valve.  2. Aortic valve leaflet morphology not well visualized.  Peak transaortic valve gradient equals 22 mm Hg, mean  transaortic valve gradient equals 11 mm Hg, consistent with  probable mild aortic stenosis.  3. Endocardium not well visualized; grossly normal left  ventricular systolic function.  Endocardial visualization  enhanced with intravenous injection of echo contrast  (Definity).  4. The right ventricle is not well visualized.    < end of copied text >    < from: Xray Chest 1 View- PORTABLE-Urgent (Xray Chest 1 View- PORTABLE-Urgent .) (02.28.23 @ 17:51) >  FINDINGS:  Stable pulmonary edema. Left pleural effusion.  No pneumothorax.  Sternotomy. Cardiomegaly despite projection. Mitral and tricuspid valve   replacements  Right neck surgical clips.    IMPRESSION:  Stable pulmonary edema and left pleural effusion.    < end of copied text >    < from: CT Abdomen and Pelvis No Cont (02.22.23 @ 09:26) >  IMPRESSION:  Exam limited by respiratory motion and lack of IV contrast.    Pulmonary edema left greater than right.    Similar-appearing 3.2 cm left upper pole renal mass not well evaluated on   this noncontrast scan, but shown to contain internal vascularity on renal   ultrasound from April 6, 2021, suspicious for renal neoplasm. Further   evaluation with contrast-enhanced CT or MRI abdomen (renal mass protocol)   is recommended.    < end of copied text >    ------------------------------------------------------------------------------------------------------------    ALLERGIES:  Allergies    No Known Allergies    Intolerances    MEDICATIONS  (STANDING):  aMIOdarone    Tablet 200 milliGRAM(s) Oral daily  budesonide 160 MICROgram(s)/formoterol 4.5 MICROgram(s) Inhaler 2 Puff(s) Inhalation two times a day  dextrose 5%. 1000 milliLiter(s) (50 mL/Hr) IV Continuous <Continuous>  dextrose 5%. 1000 milliLiter(s) (100 mL/Hr) IV Continuous <Continuous>  dextrose 50% Injectable 25 Gram(s) IV Push once  dextrose 50% Injectable 12.5 Gram(s) IV Push once  dextrose 50% Injectable 25 Gram(s) IV Push once  ferrous    sulfate 325 milliGRAM(s) Oral daily  glucagon  Injectable 1 milliGRAM(s) IntraMuscular once  heparin  Infusion.  Unit(s)/Hr (20 mL/Hr) IV Continuous <Continuous>  hydrALAZINE 25 milliGRAM(s) Oral three times a day  insulin detemir injectable (LEVEMIR) 20 Unit(s) SubCutaneous at bedtime  insulin lispro (ADMELOG) corrective regimen sliding scale   SubCutaneous three times a day before meals  insulin lispro (ADMELOG) corrective regimen sliding scale   SubCutaneous at bedtime  insulin lispro Injectable (ADMELOG) 5 Unit(s) SubCutaneous three times a day before meals  isosorbide   mononitrate ER Tablet (IMDUR) 30 milliGRAM(s) Oral daily  pantoprazole    Tablet 40 milliGRAM(s) Oral before breakfast  petrolatum white Ointment 1 Application(s) Topical every 6 hours  polyethylene glycol 3350 17 Gram(s) Oral daily  predniSONE   Tablet 5 milliGRAM(s) Oral daily  senna 2 Tablet(s) Oral at bedtime  simvastatin 10 milliGRAM(s) Oral at bedtime  sodium chloride 0.65% Nasal 1 Spray(s) Both Nostrils every 4 hours  torsemide 40 milliGRAM(s) Oral daily  warfarin 5 milliGRAM(s) Oral once    MEDICATIONS  (PRN):  acetaminophen    Suspension .. 650 milliGRAM(s) Oral every 6 hours PRN Temp greater or equal to 38C (100.4F), Mild Pain (1 - 3), Moderate Pain (4 - 6)  albuterol    90 MICROgram(s) HFA Inhaler 2 Puff(s) Inhalation every 6 hours PRN Bronchospasm  aluminum hydroxide/magnesium hydroxide/simethicone Suspension 30 milliLiter(s) Oral every 4 hours PRN Dyspepsia  bisacodyl 5 milliGRAM(s) Oral every 12 hours PRN Constipation  dextrose Oral Gel 15 Gram(s) Oral once PRN Blood Glucose LESS THAN 70 milliGRAM(s)/deciliter  heparin   Injectable 9000 Unit(s) IV Push every 6 hours PRN For aPTT less than 40  heparin   Injectable 4500 Unit(s) IV Push every 6 hours PRN For aPTT between 40 - 57  lidocaine   4% Patch 1 Patch Transdermal daily PRN pain  melatonin 3 milliGRAM(s) Oral at bedtime PRN Insomnia  ondansetron Injectable 4 milliGRAM(s) IV Push three times a day PRN Nausea and/or Vomiting    ------------------------------------------------------------------------------------------------------------    CRITICAL CARE:  [ ]Shock Present  [ ]Septic [ ]Cardiogenic [ ]Neurologic [ ]Hypovolemic [ ] Undifferentiated/Mixed   [ ]Vasopressors [ ]Inotropes     [X ]Respiratory failure present: [X ]Acute  [X ]Chronic [X ]Hypoxic  [X ]Hypercarbic   [ ]Mechanical ventilation   [X ]Non-invasive ventilatory support   [ ]High flow    [ ]Non-rebreather/Venti     [ ]Other organ failure     Other REFERRALS:  [ ]Hospice  [ ]Child Life  [ ]Social Work  [ ]Case management [ ]Holistic Therapy    HPI:  Patient is a 73 y/o female with DM/HTN/MIGUEL/COPD/CHF/CKD/Afib/Pulm HTN/Mitral and Tricuspid Valve replacement (mechanical valve on warfarin), chronic respiratory failure 3L home O2 presents to ED for evaluation of epistaxis. Patient was recently here for COPD exacerbation and discharged home with therapeutic INR. She was in a relatively baseline state of health and uses 3L NC at home until this PM she started having diffuse epistaxis for about 30 minutes. She states it was jacey blood and she used up almost 1 roll of paper towels. She states she has been taking her medication consistently and able to state that she has taken her warfarin 5mg this AM. Denies chest pain, dyspnea, diarrhea, fever, chills, cough, URI symptoms, headaches. She does have chronic dizziness. Denies melena or hematochezia. In the ED, NC3L, 168/69. Epistaxis stopped. INR subtherapeutic to 1~. Hgb to ~8 from 10 from last admission. Epistaxis revolved. Hospital course complicated with a prolonged hospitalization with worsening respiratory status with hypoxemic and hypercarbic respiratory failure, on bipap at night and renal failure. Imaging shows L renal mass 3.2 cm, suspicious for malignancy. On 2/28 had RRT for unresponsiveness, placed on bipap. Palliative consulted for complex medical decision making in the setting of serious illness.     Patient seen this AM, comfortable on nasal cannula, A&Ox3, pleasant. See below for GOC.     PERTINENT PM/SXH:   Atrial fibrillation    Pulmonary hypertension    MIGUEL (obstructive sleep apnea)    COPD (chronic obstructive pulmonary disease)    CHF (congestive heart failure)    HTN (hypertension)    Gout    Mitral valve replaced    Tricuspid valve replaced    CHF (congestive heart failure)    Hypertension    COPD (chronic obstructive pulmonary disease)    Chronic atrial fibrillation    HTN (hypertension)    COPD, severe    History of mitral valve replacement with mechanical valve      No significant past surgical history    History of mitral valve replacement with mechanical valve    Tricuspid valve replaced    No significant past surgical history      FAMILY HISTORY:  Family history of hypertension (Father, Sibling)    Family history of stroke    Family history of hypertension (Mother)    ------------------------------------------------------------------------------------------------------------  ITEMS NOT CHECKED ARE NOT PRESENT    SOCIAL HISTORY:   Living Situation: [X ]Home  [ ]Long term care  [ ]Rehab [ ]Other  Support: Lives with  and daughter Chata/Fabio? Worked as a tech in surgery OR     Substance hx:  [ ]   Tobacco hx:  [ ]   Alcohol hx: [ ]   Family Hx substance abuse [ ]yes [ ]no    Islam/Spirituality: Worship/Jew   PCSSQ[Palliative Care Spiritual Screening Question]   Severity (0-10): 0  Score of 4 or > indicate consideration of Chaplaincy referral.  Chaplaincy Referral: [ ] yes [ ] refused [X ] following    Caregiver [place]? : [ ] yes [X ] no [ ] Deferred [ ] Declined               Social work referral [ ] Patient & Family Centered Care Referral [ ]    Anticipatory Grief present?:  [ ] yes [X ] no  [ ] Deferred                    Social work referral [ ] Patient & Family Centered Care Referral [ ]    ------------------------------------------------------------------------------------------------------------    PRESENT SYMPTOMS:  [X ] No     [ ] Unable to self-report      [ ] CPOT (ICU)     [ ] PAINADs     [ ] RDOS    [ ] Yes     Source if other than patient:  [ ]Family   [ ]Team     PAIN:   If blank, patient unable to specify   [ ]yes [ ]no  QOL impact-   Location -                    Aggravating factors -  Quality -  Radiation -  Timing-  Pain at most severe level (0-10 scale):  Pain at minimal acceptable level/Pain Goal (0-10 scale):     SYMPTOMS:   Dyspnea:                           [X ]Mild [ ]Moderate [ ]Severe  Anxiety:                             [ ]Mild [ ]Moderate [ ]Severe  Fatigue:                             [ ]Mild [X ]Moderate [ ]Severe  Nausea/Vomiting:              [ ]Mild [ ]Moderate [ ]Severe  Loss of appetite:                [ ]Mild [ ]Moderate [X ]Severe  Constipation:                     [ ]Mild [ ]Moderate [ ]Severe    Other Symptoms:  [X ]All other review of systems negative     Home Medications for symptoms if any:  I-Stop Reference No:    ------------------------------------------------------------------------------------------------------------    FUNCTIONAL STATUS:     Baseline ADL (prior to admission):  [ ] Independent  [ ] Moderate Assistance [X ] Dependent  Palliative Performance Score: 30%     [X ]PPSV2 < or = to 30%     NUTRITIONAL STATUS:     Protein Calorie Malnutrition Present:   [ ]mild   [ ]moderate   [ ]severe   [X ]poor nutritional intake   [ ]artificial nutrition      Weight:   [ ]underweight (BMI 18.5 or less)   [X ]morbid obesity (BMI 30 or higher)   [ ]anasarca  [ ]significant weight loss     Height (cm): 152.6 (02-20-23 @ 22:00), 162.6 (01-13-23 @ 12:50), 162.6 (11-04-22 @ 17:28)  Weight (kg): 114.7 (02-20-23 @ 22:00), 108.862 (01-13-23 @ 12:50), 112.5 (11-04-22 @ 17:28)  BMI (kg/m2): 49.3 (02-20-23 @ 22:00), 41.2 (01-13-23 @ 12:50), 41.2 (01-13-23 @ 12:50)    ------------------------------------------------------------------------------------------------------------    PRIOR ADVANCE DIRECTIVES:    [ ] DNR/MOLST    [ ] Living Will    [ ] Health Care Proxy(s)    DECISION MAKER(s):  [X ] Patient    [ ] Surrogate(s)  [ ] HCP   [ ] Guardian             ------------------------------------------------------------------------------------------------------------  PHYSICAL EXAM:  Vital Signs Last 24 Hrs  T(C): 36.4 (01 Mar 2023 07:00), Max: 36.7 (28 Feb 2023 19:00)  T(F): 97.5 (01 Mar 2023 07:00), Max: 98 (28 Feb 2023 19:00)  HR: 62 (01 Mar 2023 07:08) (55 - 84)  BP: 118/54 (01 Mar 2023 07:00) (113/55 - 154/57)  BP(mean): --  RR: 20 (01 Mar 2023 07:00) (18 - 24)  SpO2: 99% (01 Mar 2023 07:08) (77% - 100%)    Parameters below as of 01 Mar 2023 07:00  Patient On (Oxygen Delivery Method): BiPAP/CPAP     I&O's Summary      GENERAL:  [ ]Cachexia  [X ] Frail  [X ]Awake  [X ]Oriented x 3   [ ]Lethargic  [ ]Unarousable  [ ]Verbal  [ ]Non-Verbal    BEHAVIORAL:   [ ] Anxiety  [ ] Delirium [ ] Agitation [ ] Other    HEENT:   [X ]Normal   [ ]Dry mouth   [ ]ET Tube/Trach  [ ]Oral lesions    PULMONARY:   [ ]Clear [ ]Tachypnea  [ ]Audible excessive secretions   [ ]Rhonchi        [ ]Right [ ]Left [ ]Bilateral  [ ]Crackles        [ ]Right [ ]Left [ ]Bilateral  [ ]Wheezing     [ ]Right [ ]Left [ ]Bilateral  [X ]Diminished breath sounds [ ]right [ ]left [X ]bilateral    CARDIOVASCULAR:    [X ]Regular [ ]Irregular [ ]Tachy  [ ]Maxim [ ]Murmur [ ]Other    GASTROINTESTINAL:  [X ]Soft  [ ]Distended   [X ]+BS  [X ]Non tender [ ]Tender  [ ]Other [ ]PEG [ ]OGT/ NGT      GENITOURINARY:  [ ]Normal [X ] Incontinent   [ ]Oliguria/Anuria   [ ]Junior    MUSCULOSKELETAL:   [ ]Normal   [ ]Weakness  [X ]Bed/Wheelchair bound [X ]Edema    NEUROLOGIC:   [X ]No focal deficits  [ ]Cognitive impairment  [ ]Dysphagia [ ]Dysarthria [ ]Paresis [ ]Other     SKIN:   [X ]Normal  [ ]Rash  [ ]Other  [ ]Pressure ulcer(s)       Present on admission [ ]y [ ]n    ------------------------------------------------------------------------------------------------------------    LABS:                        7.9    7.81  )-----------( 272      ( 01 Mar 2023 06:00 )             27.1   03-01    139  |  89<L>  |  114<H>  ----------------------------<  104<H>  4.2   |  39<H>  |  3.65<H>    Ca    10.4      01 Mar 2023 06:00  Phos  4.9     03-01  Mg     2.70     03-01    TPro  8.3  /  Alb  4.3  /  TBili  0.5  /  DBili  x   /  AST  15  /  ALT  11  /  AlkPhos  80  02-28  PT/INR - ( 01 Mar 2023 06:00 )   PT: 26.0 sec;   INR: 2.22 ratio         PTT - ( 01 Mar 2023 09:09 )  PTT:60.9 sec    ------------------------------------------------------------------------------------------------------------    RADIOLOGY & ADDITIONAL STUDIES:    < from: TTE with Doppler (w/Cont) (02.23.23 @ 16:17) >  CONCLUSIONS:  Technically very difficult study.  1. Mechanical prosthetic mitral valve replacement. Minimal  mitral regurgitation. Mean transmitral valve gradient  equals 5 mm Hg, which is probably normal in the setting of  a prosthetic valve.  2. Aortic valve leaflet morphology not well visualized.  Peak transaortic valve gradient equals 22 mm Hg, mean  transaortic valve gradient equals 11 mm Hg, consistent with  probable mild aortic stenosis.  3. Endocardium not well visualized; grossly normal left  ventricular systolic function.  Endocardial visualization  enhanced with intravenous injection of echo contrast  (Definity).  4. The right ventricle is not well visualized.    < end of copied text >    < from: Xray Chest 1 View- PORTABLE-Urgent (Xray Chest 1 View- PORTABLE-Urgent .) (02.28.23 @ 17:51) >  FINDINGS:  Stable pulmonary edema. Left pleural effusion.  No pneumothorax.  Sternotomy. Cardiomegaly despite projection. Mitral and tricuspid valve   replacements  Right neck surgical clips.    IMPRESSION:  Stable pulmonary edema and left pleural effusion.    < end of copied text >    < from: CT Abdomen and Pelvis No Cont (02.22.23 @ 09:26) >  IMPRESSION:  Exam limited by respiratory motion and lack of IV contrast.    Pulmonary edema left greater than right.    Similar-appearing 3.2 cm left upper pole renal mass not well evaluated on   this noncontrast scan, but shown to contain internal vascularity on renal   ultrasound from April 6, 2021, suspicious for renal neoplasm. Further   evaluation with contrast-enhanced CT or MRI abdomen (renal mass protocol)   is recommended.    < end of copied text >    ------------------------------------------------------------------------------------------------------------    ALLERGIES:  Allergies    No Known Allergies    Intolerances    MEDICATIONS  (STANDING):  aMIOdarone    Tablet 200 milliGRAM(s) Oral daily  budesonide 160 MICROgram(s)/formoterol 4.5 MICROgram(s) Inhaler 2 Puff(s) Inhalation two times a day  dextrose 5%. 1000 milliLiter(s) (50 mL/Hr) IV Continuous <Continuous>  dextrose 5%. 1000 milliLiter(s) (100 mL/Hr) IV Continuous <Continuous>  dextrose 50% Injectable 25 Gram(s) IV Push once  dextrose 50% Injectable 12.5 Gram(s) IV Push once  dextrose 50% Injectable 25 Gram(s) IV Push once  ferrous    sulfate 325 milliGRAM(s) Oral daily  glucagon  Injectable 1 milliGRAM(s) IntraMuscular once  heparin  Infusion.  Unit(s)/Hr (20 mL/Hr) IV Continuous <Continuous>  hydrALAZINE 25 milliGRAM(s) Oral three times a day  insulin detemir injectable (LEVEMIR) 20 Unit(s) SubCutaneous at bedtime  insulin lispro (ADMELOG) corrective regimen sliding scale   SubCutaneous three times a day before meals  insulin lispro (ADMELOG) corrective regimen sliding scale   SubCutaneous at bedtime  insulin lispro Injectable (ADMELOG) 5 Unit(s) SubCutaneous three times a day before meals  isosorbide   mononitrate ER Tablet (IMDUR) 30 milliGRAM(s) Oral daily  pantoprazole    Tablet 40 milliGRAM(s) Oral before breakfast  petrolatum white Ointment 1 Application(s) Topical every 6 hours  polyethylene glycol 3350 17 Gram(s) Oral daily  predniSONE   Tablet 5 milliGRAM(s) Oral daily  senna 2 Tablet(s) Oral at bedtime  simvastatin 10 milliGRAM(s) Oral at bedtime  sodium chloride 0.65% Nasal 1 Spray(s) Both Nostrils every 4 hours  torsemide 40 milliGRAM(s) Oral daily  warfarin 5 milliGRAM(s) Oral once    MEDICATIONS  (PRN):  acetaminophen    Suspension .. 650 milliGRAM(s) Oral every 6 hours PRN Temp greater or equal to 38C (100.4F), Mild Pain (1 - 3), Moderate Pain (4 - 6)  albuterol    90 MICROgram(s) HFA Inhaler 2 Puff(s) Inhalation every 6 hours PRN Bronchospasm  aluminum hydroxide/magnesium hydroxide/simethicone Suspension 30 milliLiter(s) Oral every 4 hours PRN Dyspepsia  bisacodyl 5 milliGRAM(s) Oral every 12 hours PRN Constipation  dextrose Oral Gel 15 Gram(s) Oral once PRN Blood Glucose LESS THAN 70 milliGRAM(s)/deciliter  heparin   Injectable 9000 Unit(s) IV Push every 6 hours PRN For aPTT less than 40  heparin   Injectable 4500 Unit(s) IV Push every 6 hours PRN For aPTT between 40 - 57  lidocaine   4% Patch 1 Patch Transdermal daily PRN pain  melatonin 3 milliGRAM(s) Oral at bedtime PRN Insomnia  ondansetron Injectable 4 milliGRAM(s) IV Push three times a day PRN Nausea and/or Vomiting    ------------------------------------------------------------------------------------------------------------    CRITICAL CARE:  [ ]Shock Present  [ ]Septic [ ]Cardiogenic [ ]Neurologic [ ]Hypovolemic [ ] Undifferentiated/Mixed   [ ]Vasopressors [ ]Inotropes     [X ]Respiratory failure present: [X ]Acute  [X ]Chronic [X ]Hypoxic  [X ]Hypercarbic   [ ]Mechanical ventilation   [X ]Non-invasive ventilatory support   [ ]High flow    [ ]Non-rebreather/Venti     [ ]Other organ failure     Other REFERRALS:  [ ]Hospice  [ ]Child Life  [ ]Social Work  [ ]Case management [ ]Holistic Therapy

## 2023-03-01 NOTE — PROGRESS NOTE ADULT - PROBLEM SELECTOR PLAN 1
as per pulm , cont bronchodilators  BPAP at night  prednisone  taper 02  diuresis per cardio-   appreciate chf team input  pt ids DNR/ DNI

## 2023-03-01 NOTE — CONSULT NOTE ADULT - PROBLEM SELECTOR RECOMMENDATION 3
- See GOC above   - I spent 30 minutes addressing advanced care planning with patient and/or decision maker(s)   - Patient has capacity to make complex medical decisions. She clearly states her wishes have always been to die naturally not connected to machines, reports having had a MOLST to reflect this, unable to locate in chart.   - MOLST completed for DNR/DNI with patient's verbal consent   - Patient has a terminal process with life expectancy of 6 months or less if disease follows its natural course, and therefore would be an appropriate candidate for hospice. Patient and daughter agreed to home hospice referral.

## 2023-03-01 NOTE — PROGRESS NOTE ADULT - SUBJECTIVE AND OBJECTIVE BOX
PULMONARY PROGRESS NOTE    DAMARI GUERRERO  MRN-[id number]    Patient is a 72y old  Female who presents with a chief complaint of Epistaxis (01 Mar 2023 12:12)      HPI:  -resting in bed on nc  -says her hip hurts her      MEDICATIONS  (STANDING):  aMIOdarone    Tablet 200 milliGRAM(s) Oral daily  budesonide 160 MICROgram(s)/formoterol 4.5 MICROgram(s) Inhaler 2 Puff(s) Inhalation two times a day  dextrose 5%. 1000 milliLiter(s) (50 mL/Hr) IV Continuous <Continuous>  dextrose 5%. 1000 milliLiter(s) (100 mL/Hr) IV Continuous <Continuous>  dextrose 50% Injectable 25 Gram(s) IV Push once  dextrose 50% Injectable 12.5 Gram(s) IV Push once  dextrose 50% Injectable 25 Gram(s) IV Push once  ferrous    sulfate 325 milliGRAM(s) Oral daily  glucagon  Injectable 1 milliGRAM(s) IntraMuscular once  heparin  Infusion.  Unit(s)/Hr (20 mL/Hr) IV Continuous <Continuous>  hydrALAZINE 25 milliGRAM(s) Oral three times a day  insulin detemir injectable (LEVEMIR) 20 Unit(s) SubCutaneous at bedtime  insulin lispro (ADMELOG) corrective regimen sliding scale   SubCutaneous three times a day before meals  insulin lispro (ADMELOG) corrective regimen sliding scale   SubCutaneous at bedtime  insulin lispro Injectable (ADMELOG) 5 Unit(s) SubCutaneous three times a day before meals  isosorbide   mononitrate ER Tablet (IMDUR) 30 milliGRAM(s) Oral daily  pantoprazole    Tablet 40 milliGRAM(s) Oral before breakfast  petrolatum white Ointment 1 Application(s) Topical every 6 hours  polyethylene glycol 3350 17 Gram(s) Oral daily  predniSONE   Tablet 5 milliGRAM(s) Oral daily  senna 2 Tablet(s) Oral at bedtime  simvastatin 10 milliGRAM(s) Oral at bedtime  sodium chloride 0.65% Nasal 1 Spray(s) Both Nostrils every 4 hours  torsemide 40 milliGRAM(s) Oral daily  warfarin 5 milliGRAM(s) Oral once    MEDICATIONS  (PRN):  acetaminophen    Suspension .. 650 milliGRAM(s) Oral every 6 hours PRN Temp greater or equal to 38C (100.4F), Mild Pain (1 - 3), Moderate Pain (4 - 6)  albuterol    90 MICROgram(s) HFA Inhaler 2 Puff(s) Inhalation every 6 hours PRN Bronchospasm  aluminum hydroxide/magnesium hydroxide/simethicone Suspension 30 milliLiter(s) Oral every 4 hours PRN Dyspepsia  bisacodyl 5 milliGRAM(s) Oral every 12 hours PRN Constipation  dextrose Oral Gel 15 Gram(s) Oral once PRN Blood Glucose LESS THAN 70 milliGRAM(s)/deciliter  heparin   Injectable 9000 Unit(s) IV Push every 6 hours PRN For aPTT less than 40  heparin   Injectable 4500 Unit(s) IV Push every 6 hours PRN For aPTT between 40 - 57  lidocaine   4% Patch 1 Patch Transdermal daily PRN pain  melatonin 3 milliGRAM(s) Oral at bedtime PRN Insomnia  ondansetron Injectable 4 milliGRAM(s) IV Push three times a day PRN Nausea and/or Vomiting      EXAM:  Vital Signs Last 24 Hrs  T(C): 36.6 (01 Mar 2023 11:57), Max: 36.7 (28 Feb 2023 19:00)  T(F): 97.9 (01 Mar 2023 11:57), Max: 98 (28 Feb 2023 19:00)  HR: 62 (01 Mar 2023 11:57) (55 - 84)  BP: 117/52 (01 Mar 2023 11:57) (113/55 - 154/57)  BP(mean): --  RR: 19 (01 Mar 2023 11:57) (19 - 24)  SpO2: 99% (01 Mar 2023 11:57) (77% - 100%)    Parameters below as of 01 Mar 2023 11:57  Patient On (Oxygen Delivery Method): nasal cannula  O2 Flow (L/min): 3      GENERAL: The patient is awake and alert in no apparent distress.     LUNGS: Clear to auscultation without wheezing, rales or rhonchi; respirations unlabored      LABS/IMAGING: reviewed                        7.9    7.81  )-----------( 272      ( 01 Mar 2023 06:00 )             27.1   03-01    139  |  89<L>  |  114<H>  ----------------------------<  104<H>  4.2   |  39<H>  |  3.65<H>    Ca    10.4      01 Mar 2023 06:00  Phos  4.9     03-01  Mg     2.70     03-01    TPro  8.3  /  Alb  4.3  /  TBili  0.5  /  DBili  x   /  AST  15  /  ALT  11  /  AlkPhos  80  02-28    < from: Xray Chest 1 View- PORTABLE-Urgent (Xray Chest 1 View- PORTABLE-Urgent .) (02.28.23 @ 17:51) >    ACC: [id number] EXAM:  XR CHEST PORTABLE URGENT 1V   ORDERED BY: ERIKA MCGINNIS     PROCEDURE DATE:  02/28/2023          INTERPRETATION:  CLINICAL INFORMATION: RRT.    EXAM: Frontal radiograph of the chest.    COMPARISON: Chest radiograph from 2/14/2023.    FINDINGS:  Stable pulmonary edema. Left pleural effusion.  No pneumothorax.  Sternotomy. Cardiomegaly despite projection. Mitral and tricuspid valve   replacements  Right neck surgical clips.    IMPRESSION:  Stable pulmonary edema and left pleural effusion.    --- End of Report ---          NIDIA PULLIAM MD; Resident Radiologist  This document has been electronically signed.  DELIA PEDERSEN MD; Attending Radiologist  This document has been electronically signed. Mar  1 2023 10:23AM    < end of copied text >    PROBLEM LIST:  72y Female with HEALTH ISSUES - PROBLEM Dx:  Epistaxis    Chronic atrial fibrillation    COPD, severe    CHF (congestive heart failure)    History of mitral valve replacement with mechanical valve    Hypertension    Need for prophylactic measure    Type 2 diabetes mellitus    ACP (advance care planning)    Palliative care encounter      RECS:  -now dnr  -NIV during sleep  -diuresis  -cont O2  -cont inhalers      Em Merida MD   [phone]

## 2023-03-02 DIAGNOSIS — Z95.2 PRESENCE OF PROSTHETIC HEART VALVE: ICD-10-CM

## 2023-03-02 LAB
ANION GAP SERPL CALC-SCNC: 15 MMOL/L — HIGH (ref 7–14)
APTT BLD: 39.3 SEC — HIGH (ref 27–36.3)
APTT BLD: >200 SEC — SIGNIFICANT CHANGE UP (ref 27–36.3)
BUN SERPL-MCNC: 110 MG/DL — HIGH (ref 7–23)
CALCIUM SERPL-MCNC: 10.6 MG/DL — HIGH (ref 8.4–10.5)
CHLORIDE SERPL-SCNC: 89 MMOL/L — LOW (ref 98–107)
CO2 SERPL-SCNC: 36 MMOL/L — HIGH (ref 22–31)
CREAT SERPL-MCNC: 3.22 MG/DL — HIGH (ref 0.5–1.3)
EGFR: 15 ML/MIN/1.73M2 — LOW
GLUCOSE BLDC GLUCOMTR-MCNC: 100 MG/DL — HIGH (ref 70–99)
GLUCOSE BLDC GLUCOMTR-MCNC: 104 MG/DL — HIGH (ref 70–99)
GLUCOSE BLDC GLUCOMTR-MCNC: 108 MG/DL — HIGH (ref 70–99)
GLUCOSE BLDC GLUCOMTR-MCNC: 113 MG/DL — HIGH (ref 70–99)
GLUCOSE BLDC GLUCOMTR-MCNC: 91 MG/DL — SIGNIFICANT CHANGE UP (ref 70–99)
GLUCOSE SERPL-MCNC: 84 MG/DL — SIGNIFICANT CHANGE UP (ref 70–99)
HCT VFR BLD CALC: 28.8 % — LOW (ref 34.5–45)
HCT VFR BLD CALC: 29.8 % — LOW (ref 34.5–45)
HGB BLD-MCNC: 8 G/DL — LOW (ref 11.5–15.5)
HGB BLD-MCNC: 8.5 G/DL — LOW (ref 11.5–15.5)
INR BLD: 2.49 RATIO — HIGH (ref 0.88–1.16)
MAGNESIUM SERPL-MCNC: 2.6 MG/DL — SIGNIFICANT CHANGE UP (ref 1.6–2.6)
MCHC RBC-ENTMCNC: 27.8 GM/DL — LOW (ref 32–36)
MCHC RBC-ENTMCNC: 28.5 GM/DL — LOW (ref 32–36)
MCHC RBC-ENTMCNC: 28.5 PG — SIGNIFICANT CHANGE UP (ref 27–34)
MCHC RBC-ENTMCNC: 29.3 PG — SIGNIFICANT CHANGE UP (ref 27–34)
MCV RBC AUTO: 102.5 FL — HIGH (ref 80–100)
MCV RBC AUTO: 102.8 FL — HIGH (ref 80–100)
NRBC # BLD: 0 /100 WBCS — SIGNIFICANT CHANGE UP (ref 0–0)
NRBC # BLD: 0 /100 WBCS — SIGNIFICANT CHANGE UP (ref 0–0)
NRBC # FLD: 0 K/UL — SIGNIFICANT CHANGE UP (ref 0–0)
NRBC # FLD: 0 K/UL — SIGNIFICANT CHANGE UP (ref 0–0)
PHOSPHATE SERPL-MCNC: 3.5 MG/DL — SIGNIFICANT CHANGE UP (ref 2.5–4.5)
PLATELET # BLD AUTO: 288 K/UL — SIGNIFICANT CHANGE UP (ref 150–400)
PLATELET # BLD AUTO: 291 K/UL — SIGNIFICANT CHANGE UP (ref 150–400)
POTASSIUM SERPL-MCNC: 4.3 MMOL/L — SIGNIFICANT CHANGE UP (ref 3.5–5.3)
POTASSIUM SERPL-SCNC: 4.3 MMOL/L — SIGNIFICANT CHANGE UP (ref 3.5–5.3)
PROTHROM AB SERPL-ACNC: 29.2 SEC — HIGH (ref 10.5–13.4)
RBC # BLD: 2.81 M/UL — LOW (ref 3.8–5.2)
RBC # BLD: 2.9 M/UL — LOW (ref 3.8–5.2)
RBC # FLD: 14 % — SIGNIFICANT CHANGE UP (ref 10.3–14.5)
RBC # FLD: 14.2 % — SIGNIFICANT CHANGE UP (ref 10.3–14.5)
SODIUM SERPL-SCNC: 140 MMOL/L — SIGNIFICANT CHANGE UP (ref 135–145)
WBC # BLD: 7.55 K/UL — SIGNIFICANT CHANGE UP (ref 3.8–10.5)
WBC # BLD: 8.64 K/UL — SIGNIFICANT CHANGE UP (ref 3.8–10.5)
WBC # FLD AUTO: 7.55 K/UL — SIGNIFICANT CHANGE UP (ref 3.8–10.5)
WBC # FLD AUTO: 8.64 K/UL — SIGNIFICANT CHANGE UP (ref 3.8–10.5)

## 2023-03-02 RX ORDER — ACETAMINOPHEN 500 MG
1000 TABLET ORAL ONCE
Refills: 0 | Status: COMPLETED | OUTPATIENT
Start: 2023-03-02 | End: 2023-03-02

## 2023-03-02 RX ORDER — WARFARIN SODIUM 2.5 MG/1
5 TABLET ORAL ONCE
Refills: 0 | Status: COMPLETED | OUTPATIENT
Start: 2023-03-02 | End: 2023-03-02

## 2023-03-02 RX ORDER — OXYMETAZOLINE HYDROCHLORIDE 0.5 MG/ML
2 SPRAY NASAL ONCE
Refills: 0 | Status: COMPLETED | OUTPATIENT
Start: 2023-03-02 | End: 2023-03-02

## 2023-03-02 RX ADMIN — Medication 1 SPRAY(S): at 13:14

## 2023-03-02 RX ADMIN — Medication 1 SPRAY(S): at 09:36

## 2023-03-02 RX ADMIN — Medication 25 MILLIGRAM(S): at 05:31

## 2023-03-02 RX ADMIN — Medication 400 MILLIGRAM(S): at 04:02

## 2023-03-02 RX ADMIN — ISOSORBIDE MONONITRATE 30 MILLIGRAM(S): 60 TABLET, EXTENDED RELEASE ORAL at 13:14

## 2023-03-02 RX ADMIN — Medication 40 MILLIGRAM(S): at 05:31

## 2023-03-02 RX ADMIN — Medication 1 SPRAY(S): at 17:16

## 2023-03-02 RX ADMIN — HEPARIN SODIUM 0 UNIT(S)/HR: 5000 INJECTION INTRAVENOUS; SUBCUTANEOUS at 20:32

## 2023-03-02 RX ADMIN — Medication 1 APPLICATION(S): at 17:16

## 2023-03-02 RX ADMIN — SENNA PLUS 2 TABLET(S): 8.6 TABLET ORAL at 22:40

## 2023-03-02 RX ADMIN — HEPARIN SODIUM 500 UNIT(S)/HR: 5000 INJECTION INTRAVENOUS; SUBCUTANEOUS at 21:56

## 2023-03-02 RX ADMIN — Medication 1 APPLICATION(S): at 13:14

## 2023-03-02 RX ADMIN — OXYMETAZOLINE HYDROCHLORIDE 2 SPRAY(S): 0.5 SPRAY NASAL at 22:39

## 2023-03-02 RX ADMIN — Medication 20 UNIT(S): at 22:40

## 2023-03-02 RX ADMIN — BUDESONIDE AND FORMOTEROL FUMARATE DIHYDRATE 2 PUFF(S): 160; 4.5 AEROSOL RESPIRATORY (INHALATION) at 13:14

## 2023-03-02 RX ADMIN — LIDOCAINE 1 PATCH: 4 CREAM TOPICAL at 13:12

## 2023-03-02 RX ADMIN — Medication 25 MILLIGRAM(S): at 22:39

## 2023-03-02 RX ADMIN — Medication 650 MILLIGRAM(S): at 14:00

## 2023-03-02 RX ADMIN — BUDESONIDE AND FORMOTEROL FUMARATE DIHYDRATE 2 PUFF(S): 160; 4.5 AEROSOL RESPIRATORY (INHALATION) at 22:41

## 2023-03-02 RX ADMIN — Medication 1 SPRAY(S): at 05:27

## 2023-03-02 RX ADMIN — SIMVASTATIN 10 MILLIGRAM(S): 20 TABLET, FILM COATED ORAL at 22:40

## 2023-03-02 RX ADMIN — Medication 5 UNIT(S): at 08:19

## 2023-03-02 RX ADMIN — HEPARIN SODIUM 900 UNIT(S)/HR: 5000 INJECTION INTRAVENOUS; SUBCUTANEOUS at 06:02

## 2023-03-02 RX ADMIN — Medication 25 MILLIGRAM(S): at 13:13

## 2023-03-02 RX ADMIN — Medication 1 SPRAY(S): at 01:26

## 2023-03-02 RX ADMIN — HEPARIN SODIUM 0 UNIT(S)/HR: 5000 INJECTION INTRAVENOUS; SUBCUTANEOUS at 19:55

## 2023-03-02 RX ADMIN — WARFARIN SODIUM 5 MILLIGRAM(S): 2.5 TABLET ORAL at 22:39

## 2023-03-02 RX ADMIN — Medication 5 MILLIGRAM(S): at 05:31

## 2023-03-02 RX ADMIN — HEPARIN SODIUM 900 UNIT(S)/HR: 5000 INJECTION INTRAVENOUS; SUBCUTANEOUS at 17:35

## 2023-03-02 RX ADMIN — Medication 325 MILLIGRAM(S): at 13:13

## 2023-03-02 RX ADMIN — Medication 650 MILLIGRAM(S): at 13:12

## 2023-03-02 RX ADMIN — Medication 1 SPRAY(S): at 22:40

## 2023-03-02 RX ADMIN — Medication 1 APPLICATION(S): at 06:35

## 2023-03-02 RX ADMIN — HEPARIN SODIUM 9000 UNIT(S): 5000 INJECTION INTRAVENOUS; SUBCUTANEOUS at 06:16

## 2023-03-02 RX ADMIN — HEPARIN SODIUM 900 UNIT(S)/HR: 5000 INJECTION INTRAVENOUS; SUBCUTANEOUS at 07:39

## 2023-03-02 RX ADMIN — POLYETHYLENE GLYCOL 3350 17 GRAM(S): 17 POWDER, FOR SOLUTION ORAL at 13:13

## 2023-03-02 RX ADMIN — AMIODARONE HYDROCHLORIDE 200 MILLIGRAM(S): 400 TABLET ORAL at 05:31

## 2023-03-02 RX ADMIN — PANTOPRAZOLE SODIUM 40 MILLIGRAM(S): 20 TABLET, DELAYED RELEASE ORAL at 06:19

## 2023-03-02 NOTE — PROGRESS NOTE ADULT - SUBJECTIVE AND OBJECTIVE BOX
PATIENT SEEN AND EXAMINED ON :- 3/2/23  DATE OF SERVICE:    3/2/23         Interim events noted,Labs ,Radiological studies and Cardiology tests reviewed .      PMH -reviewed admission note, no change since admission  Heart failure: acute [ ] chronic [ ] acute or chronic [ ] diastolic [ ] systolic [ ] combined systolic and diastolic[ ]  MISAEL: ATN[ ] renal medullary necrosis [ ] CKD I [ ]CKDII [ ]CKD III [ ]CKD IV [ ]CKD V [ ]Other pathological lesions [ ]    MEDICATIONS  (STANDING):  aMIOdarone    Tablet 200 milliGRAM(s) Oral daily  budesonide 160 MICROgram(s)/formoterol 4.5 MICROgram(s) Inhaler 2 Puff(s) Inhalation two times a day  dextrose 5%. 1000 milliLiter(s) (50 mL/Hr) IV Continuous <Continuous>  dextrose 5%. 1000 milliLiter(s) (100 mL/Hr) IV Continuous <Continuous>  dextrose 50% Injectable 25 Gram(s) IV Push once  dextrose 50% Injectable 12.5 Gram(s) IV Push once  dextrose 50% Injectable 25 Gram(s) IV Push once  ferrous    sulfate 325 milliGRAM(s) Oral daily  glucagon  Injectable 1 milliGRAM(s) IntraMuscular once  heparin  Infusion.  Unit(s)/Hr (20 mL/Hr) IV Continuous <Continuous>  hydrALAZINE 25 milliGRAM(s) Oral three times a day  insulin detemir injectable (LEVEMIR) 20 Unit(s) SubCutaneous at bedtime  insulin lispro (ADMELOG) corrective regimen sliding scale   SubCutaneous at bedtime  insulin lispro (ADMELOG) corrective regimen sliding scale   SubCutaneous three times a day before meals  insulin lispro Injectable (ADMELOG) 5 Unit(s) SubCutaneous three times a day before meals  isosorbide   mononitrate ER Tablet (IMDUR) 30 milliGRAM(s) Oral daily  pantoprazole    Tablet 40 milliGRAM(s) Oral before breakfast  petrolatum white Ointment 1 Application(s) Topical every 6 hours  polyethylene glycol 3350 17 Gram(s) Oral daily  predniSONE   Tablet 5 milliGRAM(s) Oral daily  senna 2 Tablet(s) Oral at bedtime  simvastatin 10 milliGRAM(s) Oral at bedtime  sodium chloride 0.65% Nasal 1 Spray(s) Both Nostrils every 4 hours  torsemide 40 milliGRAM(s) Oral daily  warfarin 5 milliGRAM(s) Oral once    MEDICATIONS  (PRN):  acetaminophen    Suspension .. 650 milliGRAM(s) Oral every 6 hours PRN Temp greater or equal to 38C (100.4F), Mild Pain (1 - 3), Moderate Pain (4 - 6)  albuterol    90 MICROgram(s) HFA Inhaler 2 Puff(s) Inhalation every 6 hours PRN Bronchospasm  aluminum hydroxide/magnesium hydroxide/simethicone Suspension 30 milliLiter(s) Oral every 4 hours PRN Dyspepsia  bisacodyl 5 milliGRAM(s) Oral every 12 hours PRN Constipation  dextrose Oral Gel 15 Gram(s) Oral once PRN Blood Glucose LESS THAN 70 milliGRAM(s)/deciliter  heparin   Injectable 9000 Unit(s) IV Push every 6 hours PRN For aPTT less than 40  heparin   Injectable 4500 Unit(s) IV Push every 6 hours PRN For aPTT between 40 - 57  lidocaine   4% Patch 1 Patch Transdermal daily PRN pain  melatonin 3 milliGRAM(s) Oral at bedtime PRN Insomnia  ondansetron Injectable 4 milliGRAM(s) IV Push three times a day PRN Nausea and/or Vomiting  oxymetazoline 0.05% Nasal Spray 2 Spray(s) Both Nostrils once PRN epistaxis              REVIEW OF SYSTEMS:  Constitutional: [ ] fever, [ ]weight loss,  [ ]fatigue  Eyes: [ ] visual changes  Respiratory: [ ]shortness of breath;  [ ] cough, [ ]wheezing, [ ]chills, [ ]hemoptysis  Cardiovascular: [ ] chest pain, [ ]palpitations, [ ]dizziness,  [ ]leg swelling[ ]orthopnea[ ]PND  Gastrointestinal: [ ] abdominal pain, [ ]nausea, [ ]vomiting,  [ ]diarrhea   Genitourinary: [ ] dysuria, [ ] hematuria  Neurologic: [ ] headaches [ ] tremors[ ]weakness  Skin: [ ] itching, [ ]burning, [ ] rashes  Endocrine: [ ] heat or cold intolerance  Musculoskeletal: [ ] joint pain or swelling; [ ] muscle, back, or extremity pain  Psychiatric: [ ] depression, [ ]anxiety, [ ]mood swings, or [ ]difficulty sleeping  Hematologic: [ ] easy bruising, [ ] bleeding gums    [x] All remaining systems negative except as per above.   [ ]Unable to obtain.    Vital Signs Last 24 Hrs  T(C): 36.5 (02 Mar 2023 19:00), Max: 37 (02 Mar 2023 06:13)  T(F): 97.7 (02 Mar 2023 19:00), Max: 98.6 (02 Mar 2023 06:13)  HR: 68 (02 Mar 2023 19:35) (59 - 83)  BP: 125/50 (02 Mar 2023 19:00) (121/46 - 132/62)  BP(mean): --  RR: 20 (02 Mar 2023 19:00) (17 - 20)  SpO2: 97% (02 Mar 2023 19:35) (97% - 100%)    Parameters below as of 02 Mar 2023 19:00  Patient On (Oxygen Delivery Method): nasal cannula  O2 Flow (L/min): 3    I&O's Summary    Orthostatic VS      PHYSICAL EXAM:  General: No acute distress BMI-  HEENT: EOMI, PERRL  Neck: Supple, [ ] JVD  Lungs: Equal air entry bilaterally; [ ] rales [ ] wheezing [ ] rhonchi  Heart: Regular rate and rhythm; [ ] murmur   /6 [ ] systolic [ ] diastolic [ ] radiation[ ] rubs [ ]  gallops  Abdomen: Nontender, bowel sounds present  Extremities: No clubbing, cyanosis, [ ] edema  Nervous system:  Alert & Oriented X3, no focal deficits  Psychiatric: Normal affect  Skin: No rashes or lesions    LABS:  03-02    140  |  89<L>  |  110<H>  ----------------------------<  84  4.3   |  36<H>  |  3.22<H>    Ca    10.6<H>      02 Mar 2023 05:20  Phos  3.5     03-02  Mg     2.60     03-02      Creatinine Trend: 3.22<--, 3.65<--, 3.52<--, 3.40<--, 3.55<--, 3.56<--                        8.5    8.64  )-----------( 291      ( 02 Mar 2023 18:58 )             29.8     PT/INR - ( 02 Mar 2023 05:20 )   PT: 29.2 sec;   INR: 2.49 ratio         PTT - ( 02 Mar 2023 18:58 )  PTT:>200 sec  Lipid Panel:   Cardiac Enzymes:

## 2023-03-02 NOTE — PROGRESS NOTE ADULT - SUBJECTIVE AND OBJECTIVE BOX
[facility] NEPHROLOGY- PROGRESS NOTE    72 year old Female with history of COPD, CHF presents with weakness. Nephrology consulted for elevated Scr.    RRT called overnight for hypoxia.    REVIEW OF SYSTEMS:  Gen: no fevers  Cards: no chest pain  Resp: + dyspnea  GI: no nausea or vomiting or diarrhea  Vascular: + LE edema improving    No Known Allergies      Hospital Medications: Medications reviewed      VITALS:  T(F): 98.6 (03-02-23 @ 06:13), Max: 98.6 (03-01-23 @ 14:28)  HR: 62 (03-02-23 @ 06:13)  BP: 125/54 (03-02-23 @ 06:13)  RR: 18 (03-02-23 @ 06:13)  SpO2: 100% (03-02-23 @ 06:13)  Wt(kg): --        PHYSICAL EXAM:    Gen: NAD, calm  Cards: RRR, +S1/S2, no M/G/R  Resp: + tachypnea  GI: soft, + TTP in epigastric area, ND, NABS  Vascular: no LE edema      LABS:  03-02    140  |  89<L>  |  110<H>  ----------------------------<  84  4.3   |  36<H>  |  3.22<H>    Ca    10.6<H>      02 Mar 2023 05:20  Phos  3.5     03-02  Mg     2.60     03-02    TPro  8.3  /  Alb  4.3  /  TBili  0.5  /  DBili      /  AST  15  /  ALT  11  /  AlkPhos  80  02-28    Creatinine Trend: 3.22 <--, 3.65 <--, 3.52 <--, 3.40 <--, 3.55 <--, 3.56 <--, 3.41 <--, 3.09 <--                        8.0    7.55  )-----------( 288      ( 02 Mar 2023 05:20 )             28.8     Urine Studies:

## 2023-03-02 NOTE — PROGRESS NOTE ADULT - SUBJECTIVE AND OBJECTIVE BOX
SUBJECTIVE / OVERNIGHT EVENTS:pt seen and examined, c/o shortness of breath   03-02-23    MEDICATIONS  (STANDING):  aMIOdarone    Tablet 200 milliGRAM(s) Oral daily  budesonide 160 MICROgram(s)/formoterol 4.5 MICROgram(s) Inhaler 2 Puff(s) Inhalation two times a day  dextrose 5%. 1000 milliLiter(s) (100 mL/Hr) IV Continuous <Continuous>  dextrose 5%. 1000 milliLiter(s) (50 mL/Hr) IV Continuous <Continuous>  dextrose 50% Injectable 25 Gram(s) IV Push once  dextrose 50% Injectable 12.5 Gram(s) IV Push once  dextrose 50% Injectable 25 Gram(s) IV Push once  ferrous    sulfate 325 milliGRAM(s) Oral daily  glucagon  Injectable 1 milliGRAM(s) IntraMuscular once  heparin  Infusion.  Unit(s)/Hr (20 mL/Hr) IV Continuous <Continuous>  hydrALAZINE 25 milliGRAM(s) Oral three times a day  insulin detemir injectable (LEVEMIR) 20 Unit(s) SubCutaneous at bedtime  insulin lispro (ADMELOG) corrective regimen sliding scale   SubCutaneous three times a day before meals  insulin lispro (ADMELOG) corrective regimen sliding scale   SubCutaneous at bedtime  insulin lispro Injectable (ADMELOG) 5 Unit(s) SubCutaneous three times a day before meals  isosorbide   mononitrate ER Tablet (IMDUR) 30 milliGRAM(s) Oral daily  pantoprazole    Tablet 40 milliGRAM(s) Oral before breakfast  petrolatum white Ointment 1 Application(s) Topical every 6 hours  polyethylene glycol 3350 17 Gram(s) Oral daily  predniSONE   Tablet 5 milliGRAM(s) Oral daily  senna 2 Tablet(s) Oral at bedtime  simvastatin 10 milliGRAM(s) Oral at bedtime  sodium chloride 0.65% Nasal 1 Spray(s) Both Nostrils every 4 hours  torsemide 40 milliGRAM(s) Oral daily  warfarin 5 milliGRAM(s) Oral once    MEDICATIONS  (PRN):  acetaminophen    Suspension .. 650 milliGRAM(s) Oral every 6 hours PRN Temp greater or equal to 38C (100.4F), Mild Pain (1 - 3), Moderate Pain (4 - 6)  albuterol    90 MICROgram(s) HFA Inhaler 2 Puff(s) Inhalation every 6 hours PRN Bronchospasm  aluminum hydroxide/magnesium hydroxide/simethicone Suspension 30 milliLiter(s) Oral every 4 hours PRN Dyspepsia  bisacodyl 5 milliGRAM(s) Oral every 12 hours PRN Constipation  dextrose Oral Gel 15 Gram(s) Oral once PRN Blood Glucose LESS THAN 70 milliGRAM(s)/deciliter  heparin   Injectable 9000 Unit(s) IV Push every 6 hours PRN For aPTT less than 40  heparin   Injectable 4500 Unit(s) IV Push every 6 hours PRN For aPTT between 40 - 57  lidocaine   4% Patch 1 Patch Transdermal daily PRN pain  melatonin 3 milliGRAM(s) Oral at bedtime PRN Insomnia  ondansetron Injectable 4 milliGRAM(s) IV Push three times a day PRN Nausea and/or Vomiting  oxymetazoline 0.05% Nasal Spray 2 Spray(s) Both Nostrils once PRN epistaxis    Vital Signs Last 24 Hrs  T(C): 36.5 (03-02-23 @ 19:00), Max: 37 (03-02-23 @ 06:13)  T(F): 97.7 (03-02-23 @ 19:00), Max: 98.6 (03-02-23 @ 06:13)  HR: 68 (03-02-23 @ 19:35) (60 - 83)  BP: 125/50 (03-02-23 @ 19:00) (121/46 - 132/62)  BP(mean): --  RR: 20 (03-02-23 @ 19:00) (17 - 20)  SpO2: 97% (03-02-23 @ 19:35) (97% - 100%)            Constitutional: No fever, fatigue  Skin: No rash.  Eyes: No recent vision problems or eye pain.  ENT: No congestion, ear pain, or sore throat.  Cardiovascular: No chest pain or palpation.  Respiratory: sob+  Gastrointestinal: No abdominal pain, nausea, vomiting, or diarrhea.  Genitourinary: No dysuria.  Musculoskeletal: No joint swelling.  Neurologic: No headache.    PHYSICAL EXAM:  GENERAL: NAD  EYES: EOMI, PERRLA  NECK: Supple, No JVD  CHEST/LUNG: dec breath sounds at bases  HEART:  S1 , S2 +  ABDOMEN: soft, bs+  EXTREMITIES:  edema+  NEUROLOGY:alert awake oriented    LABS:  03-02    140  |  89<L>  |  110<H>  ----------------------------<  84  4.3   |  36<H>  |  3.22<H>    Ca    10.6<H>      02 Mar 2023 05:20  Phos  3.5     03-02  Mg     2.60     03-02      Creatinine Trend: 3.22 <--, 3.65 <--, 3.52 <--, 3.40 <--, 3.55 <--, 3.56 <--, 3.41 <--, 3.09 <--                        8.5    8.64  )-----------( 291      ( 02 Mar 2023 18:58 )             29.8     Urine Studies:              PT/INR - ( 02 Mar 2023 05:20 )   PT: 29.2 sec;   INR: 2.49 ratio         PTT - ( 02 Mar 2023 18:58 )  PTT:>200 sec  Imaging Personally Reviewed:yes    Consultant(s) Notes Reviewed:  yes    Care Discussed with Consultants/Other Providers:yes

## 2023-03-02 NOTE — PROVIDER CONTACT NOTE (OTHER) - ASSESSMENT
100% 3 L NC, 98.4, 106/50, 60 BPM, 20 RR  Pt stable and asymptomatic in bed
Pt A&Ox3-4. VILLA. Pt on BiPAP overnight. Pt currently on heparin gtt. No s/s of chest pain, palpitations. Pt unable to take PO meds, pt is on BiPAP- acp made aware.
Pt A&Ox4. 3 L NC. VSS. No distress noted. No s/s of bleeding or bruising. Pt denies chest pain, sob, palpitations, dizziness, HA. Frequent rounding. Call bell within reach. Safety maintained. Will continue to monitor.
Pt denies chest pain, sob, & no s/s of distress noted. Pt IF=165/51, P=51, DmR2=358%, T=97.5 & RR=20.
109/47, 97.9, 60, 18, 100% bipap  Pt asymptomatic, hydralazine held
Patient with nosebleed. On heparin drip. Ptt was not drawn earlier today due to patient being hard stick. RN able to draw ptt. Awaiting results to adjust heparin drip according to nomogram.   No other signs of bleeding noted.
122/52, 55, 100% BIPAP, 19 RR, 97.5  Hydralazine and amiodarone held.  Pt stable and asymptomatic in bed.
89F, 54 HR, 119/47, 18 RR, 98% 3 L NC  Hydralazine held.  Pt stable and asymptomatic.
97.8, 57 HR, 113/43, 20 RR, 97% 3 L NC  Pt stable and asymptomatic. Hydralazine held.
Patient BP outside of parameters, 125/50. Patient at baseline mental status. In no acute distress.
Patient sleeping comfortably in bed. Denies lightheadedness/dizziness. States this is her baseline diastolic BP.
106/52, 59, 100%, 97.8  pt stable and asymptomatic in bed
Pt AOX4, VSS. CO SOB and dizziness while sitting up at edge of bed
Patient alert and oriented, verbally responsive slightly lethargic, able to tolerate juice and pretzles PO.

## 2023-03-02 NOTE — CHART NOTE - NSCHARTNOTEFT_GEN_A_CORE
Notified by RN that patient w/ nose bleed. Patient seen at bedside by provider. Patient w/ epistaxis from anterior left nare. 3-4 bloody tissues bedside w/ 3inch large clot passed. Now bleeding appears to be stopping. Will give afirin x1. CW w/ oceanspray to keep nose moist while on BiPAP. Will monitor CBC and ptt as PT on hep to coumadin bridge. Will cw AC for now as bleeding is minimal, patient hemodynamically stable, and CBC WNL. Notified by RN that patient w/ nose bleed. Patient seen at bedside by provider. Patient w/ epistaxis from anterior left nare. 3-4 bloody tissues bedside w/ 3inch large clot passed. Now bleeding appears to be stopping. Will give afirin x1. If hemostasis achieved, will CW w/ oceanspray to keep nose moist before on BiPAP. Will monitor CBC and ptt as PT on hep to coumadin bridge. Will cw AC for now as bleeding is minimal, patient hemodynamically stable, and CBC WNL. Notified by RN that patient w/ nose bleed. Patient seen at bedside by provider. Patient w/ epistaxis from anterior left nare. 3-4 bloody tissues bedside w/ 3inch large clot passed. Now bleeding appears to be stopping. Will give afirin x1. If hemostasis achieved, will CW w/ oceanspray to keep nose moist before on BiPAP. Will monitor CBC and ptt as PT on hep to coumadin bridge. Will cw AC for now as bleeding is minimal, patient hemodynamically stable, and CBC WNL.    Addendum 3:30:  Patient epistaxis stopped. Patient on BiPAP overnight, tolerating well. No further bleeding. Will continue to monitor.

## 2023-03-02 NOTE — PROVIDER CONTACT NOTE (OTHER) - BACKGROUND
71 y/o f PMHx COPD, HTN, CHF, CKD, pulm HTN
Patient admitted for Epistaxis. PMHx significant for HTN, A-fib, CHF, Heart valve replacements, DM2.
Pt admitted for epistaxis, hx of mitral and tricuspid valve replacement, COPD. HTN, CHF.
73 Y/O F PMHx COPD, HTN, CHF, CKD, Afib  Admit Epistaxis
history of mitral valve replacement. COPD. type 2 DM
Pt Aox4, c/o epistaxis, MISAEL, CHF and PMH of COPD on 2L NC in hospital and 3L NC at home.
admitted with epistaxis, drop in hemoglobin, subtherapeutic INR
71 y/o F PMHx COPD, HTN, DM, CHF, CKD, Afib  Admit. epistaxis
73 y/o F PMHx COPD, HTN, Admit. epistaxis
admitted with epistaxis, drop in hemoglobin, subtherapeutic INR
71 y/o F PMHx COPD, HTN, CHF
73 y/o F PMHx COPD, HTN, DM, CHF, CKD, Afib
Epistaxis. history of mitral valve replacement. DM.
Type 2 DM, HTN, Acute renal failure, HF, CAD

## 2023-03-03 LAB
ANION GAP SERPL CALC-SCNC: 12 MMOL/L — SIGNIFICANT CHANGE UP (ref 7–14)
APTT BLD: 53.3 SEC — HIGH (ref 27–36.3)
APTT BLD: 88 SEC — HIGH (ref 27–36.3)
BASOPHILS # BLD AUTO: 0.02 K/UL — SIGNIFICANT CHANGE UP (ref 0–0.2)
BASOPHILS NFR BLD AUTO: 0.3 % — SIGNIFICANT CHANGE UP (ref 0–2)
BUN SERPL-MCNC: 116 MG/DL — HIGH (ref 7–23)
CALCIUM SERPL-MCNC: 10.4 MG/DL — SIGNIFICANT CHANGE UP (ref 8.4–10.5)
CHLORIDE SERPL-SCNC: 91 MMOL/L — LOW (ref 98–107)
CO2 SERPL-SCNC: 39 MMOL/L — HIGH (ref 22–31)
CREAT SERPL-MCNC: 3.02 MG/DL — HIGH (ref 0.5–1.3)
EGFR: 16 ML/MIN/1.73M2 — LOW
EOSINOPHIL # BLD AUTO: 0.1 K/UL — SIGNIFICANT CHANGE UP (ref 0–0.5)
EOSINOPHIL NFR BLD AUTO: 1.6 % — SIGNIFICANT CHANGE UP (ref 0–6)
GLUCOSE BLDC GLUCOMTR-MCNC: 102 MG/DL — HIGH (ref 70–99)
GLUCOSE BLDC GLUCOMTR-MCNC: 88 MG/DL — SIGNIFICANT CHANGE UP (ref 70–99)
GLUCOSE BLDC GLUCOMTR-MCNC: 93 MG/DL — SIGNIFICANT CHANGE UP (ref 70–99)
GLUCOSE BLDC GLUCOMTR-MCNC: 94 MG/DL — SIGNIFICANT CHANGE UP (ref 70–99)
GLUCOSE SERPL-MCNC: 81 MG/DL — SIGNIFICANT CHANGE UP (ref 70–99)
HCT VFR BLD CALC: 28.8 % — LOW (ref 34.5–45)
HGB BLD-MCNC: 8.4 G/DL — LOW (ref 11.5–15.5)
IANC: 4.1 K/UL — SIGNIFICANT CHANGE UP (ref 1.8–7.4)
IMM GRANULOCYTES NFR BLD AUTO: 0.6 % — SIGNIFICANT CHANGE UP (ref 0–0.9)
INR BLD: 4 RATIO — HIGH (ref 0.88–1.16)
LYMPHOCYTES # BLD AUTO: 1.42 K/UL — SIGNIFICANT CHANGE UP (ref 1–3.3)
LYMPHOCYTES # BLD AUTO: 22.1 % — SIGNIFICANT CHANGE UP (ref 13–44)
MAGNESIUM SERPL-MCNC: 2.5 MG/DL — SIGNIFICANT CHANGE UP (ref 1.6–2.6)
MCHC RBC-ENTMCNC: 29.2 GM/DL — LOW (ref 32–36)
MCHC RBC-ENTMCNC: 30 PG — SIGNIFICANT CHANGE UP (ref 27–34)
MCV RBC AUTO: 102.9 FL — HIGH (ref 80–100)
MONOCYTES # BLD AUTO: 0.74 K/UL — SIGNIFICANT CHANGE UP (ref 0–0.9)
MONOCYTES NFR BLD AUTO: 11.5 % — SIGNIFICANT CHANGE UP (ref 2–14)
NEUTROPHILS # BLD AUTO: 4.1 K/UL — SIGNIFICANT CHANGE UP (ref 1.8–7.4)
NEUTROPHILS NFR BLD AUTO: 63.9 % — SIGNIFICANT CHANGE UP (ref 43–77)
NRBC # BLD: 0 /100 WBCS — SIGNIFICANT CHANGE UP (ref 0–0)
NRBC # FLD: 0 K/UL — SIGNIFICANT CHANGE UP (ref 0–0)
PHOSPHATE SERPL-MCNC: 3 MG/DL — SIGNIFICANT CHANGE UP (ref 2.5–4.5)
PLATELET # BLD AUTO: 284 K/UL — SIGNIFICANT CHANGE UP (ref 150–400)
POTASSIUM SERPL-MCNC: 4 MMOL/L — SIGNIFICANT CHANGE UP (ref 3.5–5.3)
POTASSIUM SERPL-SCNC: 4 MMOL/L — SIGNIFICANT CHANGE UP (ref 3.5–5.3)
PROTHROM AB SERPL-ACNC: 47 SEC — HIGH (ref 10.5–13.4)
RBC # BLD: 2.8 M/UL — LOW (ref 3.8–5.2)
RBC # FLD: 14.4 % — SIGNIFICANT CHANGE UP (ref 10.3–14.5)
SODIUM SERPL-SCNC: 142 MMOL/L — SIGNIFICANT CHANGE UP (ref 135–145)
WBC # BLD: 6.42 K/UL — SIGNIFICANT CHANGE UP (ref 3.8–10.5)
WBC # FLD AUTO: 6.42 K/UL — SIGNIFICANT CHANGE UP (ref 3.8–10.5)

## 2023-03-03 RX ORDER — WARFARIN SODIUM 2.5 MG/1
3 TABLET ORAL ONCE
Refills: 0 | Status: COMPLETED | OUTPATIENT
Start: 2023-03-03 | End: 2023-03-03

## 2023-03-03 RX ORDER — ACETAMINOPHEN 500 MG
650 TABLET ORAL EVERY 6 HOURS
Refills: 0 | Status: DISCONTINUED | OUTPATIENT
Start: 2023-03-03 | End: 2023-03-06

## 2023-03-03 RX ADMIN — HEPARIN SODIUM 700 UNIT(S)/HR: 5000 INJECTION INTRAVENOUS; SUBCUTANEOUS at 08:39

## 2023-03-03 RX ADMIN — LIDOCAINE 1 PATCH: 4 CREAM TOPICAL at 11:30

## 2023-03-03 RX ADMIN — LIDOCAINE 1 PATCH: 4 CREAM TOPICAL at 23:13

## 2023-03-03 RX ADMIN — ISOSORBIDE MONONITRATE 30 MILLIGRAM(S): 60 TABLET, EXTENDED RELEASE ORAL at 11:21

## 2023-03-03 RX ADMIN — Medication 1 APPLICATION(S): at 01:00

## 2023-03-03 RX ADMIN — Medication 25 MILLIGRAM(S): at 18:01

## 2023-03-03 RX ADMIN — Medication 1 SPRAY(S): at 22:18

## 2023-03-03 RX ADMIN — AMIODARONE HYDROCHLORIDE 200 MILLIGRAM(S): 400 TABLET ORAL at 06:45

## 2023-03-03 RX ADMIN — Medication 325 MILLIGRAM(S): at 11:21

## 2023-03-03 RX ADMIN — PANTOPRAZOLE SODIUM 40 MILLIGRAM(S): 20 TABLET, DELAYED RELEASE ORAL at 06:24

## 2023-03-03 RX ADMIN — Medication 1 APPLICATION(S): at 06:29

## 2023-03-03 RX ADMIN — BUDESONIDE AND FORMOTEROL FUMARATE DIHYDRATE 2 PUFF(S): 160; 4.5 AEROSOL RESPIRATORY (INHALATION) at 22:19

## 2023-03-03 RX ADMIN — Medication 5 MILLIGRAM(S): at 06:24

## 2023-03-03 RX ADMIN — SENNA PLUS 2 TABLET(S): 8.6 TABLET ORAL at 22:21

## 2023-03-03 RX ADMIN — HEPARIN SODIUM 700 UNIT(S)/HR: 5000 INJECTION INTRAVENOUS; SUBCUTANEOUS at 06:22

## 2023-03-03 RX ADMIN — SIMVASTATIN 10 MILLIGRAM(S): 20 TABLET, FILM COATED ORAL at 22:20

## 2023-03-03 RX ADMIN — Medication 40 MILLIGRAM(S): at 11:21

## 2023-03-03 RX ADMIN — Medication 1 APPLICATION(S): at 11:46

## 2023-03-03 RX ADMIN — Medication 25 MILLIGRAM(S): at 11:21

## 2023-03-03 RX ADMIN — Medication 1 SPRAY(S): at 11:22

## 2023-03-03 RX ADMIN — WARFARIN SODIUM 3 MILLIGRAM(S): 2.5 TABLET ORAL at 22:21

## 2023-03-03 RX ADMIN — Medication 1 SPRAY(S): at 06:24

## 2023-03-03 RX ADMIN — Medication 20 UNIT(S): at 22:21

## 2023-03-03 RX ADMIN — BUDESONIDE AND FORMOTEROL FUMARATE DIHYDRATE 2 PUFF(S): 160; 4.5 AEROSOL RESPIRATORY (INHALATION) at 11:22

## 2023-03-03 RX ADMIN — LIDOCAINE 1 PATCH: 4 CREAM TOPICAL at 03:58

## 2023-03-03 NOTE — PROGRESS NOTE ADULT - SUBJECTIVE AND OBJECTIVE BOX
PATIENT SEEN AND EXAMINED ON :- 3/3/23  DATE OF SERVICE:       3/3/23      Interim events noted,Labs ,Radiological studies and Cardiology tests reviewed .    MR#[id number]  PATIENT NAME:-[facility] COURSE: HPI:  73 y/o female with PMHx of Gout, COPD, HTN, DM, CHF, CKD, Afib, Pulm HTN, 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 Patient was recently here for COPD exacerbation and discharged home with therapeutic INR. She was in a relatively baseline state of health and uses 3L NC at home until this PM she started having diffuse epistaxis for about 30 minutes. She states it was jacey blood and she used up almost 1 roll of paper towels. She states she has been taking her medication consistently and able to state that she has taken her warfarin 5mg this AM. Denies chest pain, dyspnea, diarrhea, fever, chills, cough, URI symptoms, headaches. She does have chronic dizziness. Denies melena or hematochezia.  In the ED, NC3L, 168/69. Epistaxis stopped. INR subtherapeutic to 1~. Hgb to ~8 from 10 from last admission. (04 Feb 2023 23:34)      INTERIM EVENTS:Patient seen at bedside ,interim events noted.      [facility] -reviewed admission note, no change since admission  HEART FAILURE: Acute[ ]Chronic[ ] Systolic[ ] Diastolic[ ] Combined Systolic and Diastolic[ ]  CAD[ ] CABG[ ] PCI[ ]  DEVICES[ ] PPM[ ] ICD[ ] ILR[ ]  ATRIAL FIBRILLATION[ ] Paroxysmal[ ] Permanent[ ] CHADS2-[  ]  MISAEL[ ] CKD1[ ] CKD2[ ] CKD3[ ] CKD4[ ] ESRD[ ]  COPD[ ] HTN[ ]   DM[ ] Type1[ ] Type 2[ ]   CVA[ ] Paresis[ ]    AMBULATION: Assisted[ ] Cane/walker[ ] Independent[ ]    MEDICATIONS  (STANDING):  aMIOdarone    Tablet 200 milliGRAM(s) Oral daily  budesonide 160 MICROgram(s)/formoterol 4.5 MICROgram(s) Inhaler 2 Puff(s) Inhalation two times a day  dextrose 5%. 1000 milliLiter(s) (50 mL/Hr) IV Continuous <Continuous>  dextrose 5%. 1000 milliLiter(s) (100 mL/Hr) IV Continuous <Continuous>  dextrose 50% Injectable 25 Gram(s) IV Push once  dextrose 50% Injectable 12.5 Gram(s) IV Push once  dextrose 50% Injectable 25 Gram(s) IV Push once  ferrous    sulfate 325 milliGRAM(s) Oral daily  glucagon  Injectable 1 milliGRAM(s) IntraMuscular once  hydrALAZINE 25 milliGRAM(s) Oral three times a day  insulin detemir injectable (LEVEMIR) 20 Unit(s) SubCutaneous at bedtime  insulin lispro (ADMELOG) corrective regimen sliding scale   SubCutaneous at bedtime  insulin lispro (ADMELOG) corrective regimen sliding scale   SubCutaneous three times a day before meals  insulin lispro Injectable (ADMELOG) 5 Unit(s) SubCutaneous three times a day before meals  isosorbide   mononitrate ER Tablet (IMDUR) 30 milliGRAM(s) Oral daily  pantoprazole    Tablet 40 milliGRAM(s) Oral before breakfast  petrolatum white Ointment 1 Application(s) Topical every 6 hours  polyethylene glycol 3350 17 Gram(s) Oral daily  predniSONE   Tablet 5 milliGRAM(s) Oral daily  senna 2 Tablet(s) Oral at bedtime  simvastatin 10 milliGRAM(s) Oral at bedtime  sodium chloride 0.65% Nasal 1 Spray(s) Both Nostrils every 4 hours  torsemide 40 milliGRAM(s) Oral daily  warfarin 3 milliGRAM(s) Oral once    MEDICATIONS  (PRN):  acetaminophen     Tablet .. 650 milliGRAM(s) Oral every 6 hours PRN Temp greater or equal to 38C (100.4F), Mild Pain (1 - 3), Moderate Pain (4 - 6)  albuterol    90 MICROgram(s) HFA Inhaler 2 Puff(s) Inhalation every 6 hours PRN Bronchospasm  aluminum hydroxide/magnesium hydroxide/simethicone Suspension 30 milliLiter(s) Oral every 4 hours PRN Dyspepsia  bisacodyl 5 milliGRAM(s) Oral every 12 hours PRN Constipation  dextrose Oral Gel 15 Gram(s) Oral once PRN Blood Glucose LESS THAN 70 milliGRAM(s)/deciliter  lidocaine   4% Patch 1 Patch Transdermal daily PRN pain  melatonin 3 milliGRAM(s) Oral at bedtime PRN Insomnia  ondansetron Injectable 4 milliGRAM(s) IV Push three times a day PRN Nausea and/or Vomiting            REVIEW OF SYSTEMS:  Constitutional: [ ] fever, [ ]weight loss,  [ ]fatigue [ ]weight gain  Eyes: [ ] visual changes  Respiratory: [ ]shortness of breath;  [ ] cough, [ ]wheezing, [ ]chills, [ ]hemoptysis  Cardiovascular: [ ] chest pain, [ ]palpitations, [ ]dizziness,  [ ]leg swelling[ ]orthopnea[ ]PND  Gastrointestinal: [ ] abdominal pain, [ ]nausea, [ ]vomiting,  [ ]diarrhea [ ]Constipation [ ]Melena  Genitourinary: [ ] dysuria, [ ] hematuria [ ]Junior  Neurologic: [ ] headaches [ ] tremors[ ]weakness [ ]Paralysis Right[ ] Left[ ]  Skin: [ ] itching, [ ]burning, [ ] rashes  Endocrine: [ ] heat or cold intolerance  Musculoskeletal: [ ] joint pain or swelling; [ ] muscle, back, or extremity pain  Psychiatric: [ ] depression, [ ]anxiety, [ ]mood swings, or [ ]difficulty sleeping  Hematologic: [ ] easy bruising, [ ] bleeding gums    [ ] All remaining systems negative except as per above.   [ ]Unable to obtain.  [x] No change in ROS since admission      Vital Signs Last 24 Hrs  T(C): 36.4 (03 Mar 2023 17:43), Max: 36.8 (03 Mar 2023 11:00)  T(F): 97.5 (03 Mar 2023 17:43), Max: 98.3 (03 Mar 2023 11:00)  HR: 62 (03 Mar 2023 17:43) (58 - 63)  BP: 118/49 (03 Mar 2023 17:43) (118/49 - 130/49)  BP(mean): --  RR: 17 (03 Mar 2023 17:43) (16 - 20)  SpO2: 95% (03 Mar 2023 17:43) (95% - 100%)    Parameters below as of 03 Mar 2023 17:43  Patient On (Oxygen Delivery Method): nasal cannula  O2 Flow (L/min): 3    I&O's Summary      PHYSICAL EXAM:  General: No acute distress BMI-  HEENT: EOMI, PERRL  Neck: Supple, [ ] JVD  Lungs: Equal air entry bilaterally; [ ] rales [ ] wheezing [ ] rhonchi  Heart: Regular rate and rhythm; [x ] murmur   2/6 [ x] systolic [ ] diastolic [ ] radiation[ ] rubs [ ]  gallops  Abdomen: Nontender, bowel sounds present  Extremities: No clubbing, cyanosis, [ ] edema [ ]Pulses  equal and intact  Nervous system:  Alert & Oriented X3, no focal deficits  Psychiatric: Normal affect  Skin: No rashes or lesions    LABS:  03-03    142  |  91<L>  |  116<H>  ----------------------------<  81  4.0   |  39<H>  |  3.02<H>    Ca    10.4      03 Mar 2023 06:37  Phos  3.0     03-03  Mg     2.50     03-03      Creatinine Trend: 3.02<--, 3.22<--, 3.65<--, 3.52<--, 3.40<--, 3.55<--                        8.4    6.42  )-----------( 284      ( 03 Mar 2023 06:37 )             28.8     PT/INR - ( 03 Mar 2023 06:37 )   PT: 47.0 sec;   INR: 4.00 ratio         PTT - ( 03 Mar 2023 06:37 )  PTT:88.0 sec

## 2023-03-03 NOTE — PROVIDER CONTACT NOTE (OTHER) - DATE AND TIME:
03-Mar-2023 08:40
23-Feb-2023 13:35
05-Feb-2023 08:59
16-Feb-2023 22:10
17-Feb-2023 05:30
18-Feb-2023 06:45
24-Feb-2023 23:11
26-Feb-2023 05:15
02-Mar-2023 19:07
08-Feb-2023 07:52
02-Mar-2023 19:33
06-Feb-2023 15:40
22-Feb-2023 07:05
15-Feb-2023 10:20
28-Feb-2023 23:00

## 2023-03-03 NOTE — DIETITIAN NUTRITION RISK NOTIFICATION - ADDITIONAL COMMENTS/DIETITIAN RECOMMENDATIONS
Please see Chart Note Nutrition Services (03/03/2023) for complete recommendations.  Alanis Vila RDN, CDN #[id number]  Also available on [employer]

## 2023-03-03 NOTE — PROVIDER CONTACT NOTE (OTHER) - SITUATION
122/52, 55, 100% BIPAP, 19 RR, 97.5
89F, 54 HR, 119/47, 18 RR, 98% 3 L NC
Patient had low blood sugar of 52 and BP of 177/86
Patient with nosebleed
/50
Patient BP outside of parameters, 125/50
aPTT 129.8
109/47, 97.9, 60, 18, 100% bipap
97.8, 57 HR, 113/43, 20 RR, 97% 3 L NC
Pt on BiPAP unable to take PO meds
Refused echo. Pt upset bc she feels as if no one is telling her anything. She was unaware the providers wanted her to get an echo again. Pt upset.
PATIENT BG-88  AND INR-4.00 THIS AM
100% 3 L NC, 98.4, 106/50, 60 BPM, 20 RR
106/52, 59, 100%, 97.8
Pt CO SOB, dizziness. Pt sitting.
Pt HR 51 bpm; therefore, Amiodarone held for parameters. Pt refused Protonix
Private car

## 2023-03-03 NOTE — CHART NOTE - NSCHARTNOTEFT_GEN_A_CORE
Pt seen for malnutrition follow up.    Medical Course:  - Per chart, pt is 72 year old female PMH gout, COPD, HTN, type 2 DM, CHF, CKD, Afib, pulm HTN, mitral and tricuspid valve replacement (on warfarin) presenting for epistaxis found to have anemia, MISAEL on CKD. Nephrology and Cardiology following.    Nutrition Course:  - Miralax 17 gm qD, senna 2 tbalts qHS, bisacodyl 5 mg q12 hrs PRN  Diet Prescription:   - Consistent Carbohydrate {Evening Snack}   - 1000mL Fluid Restriction  - Renal Replacement {No Protein Restriction, No Conc K, No Conc Phos, Low Sodium}  - Glucerna Shake 1 PO Daily    Pertinent Medications:   - ferrous sulfate, LEVEMIR 20U qHS, ADMELOG corrective regimen sliding scale, ADMELOG 5U TIDac, pantoprazole Tablet, polyethylene glycol, predniSONE Tablet, senna, simvastatin, torsemide, warfarin, aluminum hydroxide/magnesium hydroxide/simethicone Suspension PRN, bisacodyl PRN, ondansetron IV PRN    Pertinent Labs:   - (3/3) Na 142 mmol/L Glu 81 mg/dL K+ 4.0 mmol/L Cr 3.02 mg/dL<H>  mg/dL<H> Phos 3.0 mg/dL   - POCT: (3/3) 88, (3/2)     Weight:   Weight Assessment:  Height: in / cm  IBW: lbs / kg +/-10%  BMI: kg/m^2    Physical Assessment, per flowsheets:  Edema:  Pressure Injury:  Appearance:     Estimated Needs:   [X] No change since previous assessment, based on dosing weight  lbs / kg   kcal daily @ kcal/kg,  gm protein daily @ gm/kg   [ ] recalculated, with consideration for, based on weight    Previous Nutrition Diagnosis:   [ ] Inadequate Energy Intake [ ]Inadequate Oral Intake [ ] Excessive Energy Intake   [ ] Underweight [ ] Increased Nutrient Needs [ ] Overweight/Obesity   [ ] Altered GI Function [ ] Unintended Weight Loss [ ] Food & Nutrition Related Knowledge Deficit [ ] Malnutrition   Nutrition Diagnosis is [ ] ongoing  [ ] resolved [ ] not applicable   New Nutrition Diagnosis: [ ] not applicable     Interventions:   1)   2)  3)     Monitor & Evaluate:  PO intake, tolerance to diet/supplement, nutrition related lab values, weight trends, BMs/GI distress, hydration status, skin integrity.  Alanis Vila RDN, CDN #[id number]  Also available on [employer] Teams Pt seen for malnutrition follow up.    Medical Course:  - Per chart, pt is 72 year old female PMH gout, COPD, HTN, type 2 DM, CHF, CKD, Afib, pulm HTN, mitral and tricuspid valve replacement (on warfarin) presenting for epistaxis found to have anemia, MISAEL on CKD. Nephrology and Cardiology following. Palliative was consulted, DNR/DNI per GOC.     Nutrition Course:  - Pt's family at bedside throughout interaction. Pt reports poor PO intake, nausea and intermittent vomiting. Pt is unable to tolerate Glucerna shakes, amenable to trial of Ensure Clear. Preference vocalized for hot tea. Pt continues on steroids secondary to COPD; fingersticks WDL. Noted with constipation, unknown last BM; ordered for Miralax 17 gm qD, senna 2 tablets qHS and bisacodyl 5 mg q12 hrs PRN.     Diet Prescription:   - Consistent Carbohydrate {Evening Snack}   - 1000mL Fluid Restriction  - Renal Replacement {No Protein Restriction, No Conc K, No Conc Phos, Low Sodium}  - Glucerna Shake 1 PO Daily    Pertinent Medications:   - ferrous sulfate, LEVEMIR 20U qHS, ADMELOG corrective regimen sliding scale, ADMELOG 5U TIDac, pantoprazole Tablet, polyethylene glycol, predniSONE Tablet, senna, simvastatin, torsemide, warfarin, aluminum hydroxide/magnesium hydroxide/simethicone Suspension PRN, bisacodyl PRN, ondansetron IV PRN    Pertinent Labs:   - (3/3) Na 142 mmol/L Glu 81 mg/dL K+ 4.0 mmol/L Cr 3.02 mg/dL<H>  mg/dL<H> Phos 3.0 mg/dL   - POCT: (3/3) 88, (3/2)   - (1/13) HbA1c 6.2%<H>    Weight: (3/3 bed scale obtained at time of visit, unable to tare) 220.2 lbs / 100.1 kg, (2/28) 234.5 lbs / 106.4 kg, (2/20 dosing) 252.8 lbs / 114.7 kg, (2/5 admission) 242.5 lbs / 110 kg  Weight Assessment: Downtrend in weights since admission, apparent clinically significant ~22 lb (9%) weight loss x<1 month.   Height: 60.1 in / 152.6 cm  IBW: 100 lbs / 45.5 kg +/-10%  BMI: 43.0 kg/m^2 (at most recent weight)    Physical Assessment, per flowsheets:  Edema: 1+ generalized  Pressure Injury: none noted    Estimated Needs:   [X] Recalculated, with consideration for updated anthropometrics, based on ideal body weight 100 lbs / 45.5 kg  7349-6261 kcal daily @30-35 kcal/kg, 54.6-68.25 gm protein daily @1.2-1.5 gm/kg     Previous Nutrition Diagnosis: [X] Mild malnutrition in the context of acute illness, escalated to diagnosis below   New Nutrition Diagnosis: [X] Severe malnutrition in the context of acute illness related to inadequate protein-energy intake as evidenced by </=50% of estimated needs for >/=5 days, >5% weight loss x1 month.     Interventions:   1) Recommend change diet to low sodium to maximize menu item availability; may d/c renal and consistent carbohydrate restrictions. Fluid restriction per medical discretion.  2) Recommend d/c Glucerna, add Ensure Clear 1 PO 2x daily (provides 240 kcal, 8 gm protein per 8oz serving).  3) Encouraged PO intake as tolerated, obtained food preferences and will honor as able.  4) Antiemetic per team. Monitor BMs, adjust bowel regimen as appropriate.  5) Obtain daily weights.    Monitor & Evaluate:  PO intake, tolerance to diet/supplement, nutrition related lab values, weight trends, BMs/GI distress, hydration status, skin integrity.  Alanis Vila RDN, CDN #[id number]  Also available on [employer] Teams Pt seen for malnutrition follow up.    Medical Course:  - Per chart, pt is 72 year old female PMH gout, COPD, HTN, type 2 DM, CHF, CKD, Afib, pulm HTN, mitral and tricuspid valve replacement (on warfarin) presenting for epistaxis found to have anemia, MISAEL on CKD. Nephrology and Cardiology following. Palliative was consulted, DNR/DNI per GO.     Nutrition Course:  - Pt's family at bedside throughout interaction. Pt reports poor PO intake, nausea and intermittent vomiting. Pt is unable to tolerate Glucerna shakes, amenable to trial of Ensure Clear. Preference vocalized for hot tea, applesauce. Pt continues on steroids secondary to COPD; fingersticks WDL. Noted with constipation, unknown last BM; ordered for Miralax 17 gm qD, senna 2 tablets qHS and bisacodyl 5 mg q12 hrs PRN.     Diet Prescription:   - Consistent Carbohydrate {Evening Snack}   - 1000mL Fluid Restriction  - Renal Replacement {No Protein Restriction, No Conc K, No Conc Phos, Low Sodium}  - Glucerna Shake 1 PO Daily    Pertinent Medications:   - ferrous sulfate, LEVEMIR 20U qHS, ADMELOG corrective regimen sliding scale, ADMELOG 5U TIDac, pantoprazole Tablet, polyethylene glycol, predniSONE Tablet, senna, simvastatin, torsemide, warfarin, aluminum hydroxide/magnesium hydroxide/simethicone Suspension PRN, bisacodyl PRN, ondansetron IV PRN    Pertinent Labs:   - (3/3) Na 142 mmol/L Glu 81 mg/dL K+ 4.0 mmol/L Cr 3.02 mg/dL<H>  mg/dL<H> Phos 3.0 mg/dL   - POCT: (3/3) 88, (3/2)   - (1/13) HbA1c 6.2%<H>    Weight: (3/3 bed scale obtained at time of visit, unable to tare) 220.2 lbs / 100.1 kg, (2/28) 234.5 lbs / 106.4 kg, (2/20 dosing) 252.8 lbs / 114.7 kg, (2/5 admission) 242.5 lbs / 110 kg  Weight Assessment: Downtrend in weights since admission, apparent clinically significant ~22 lb (9%) weight loss x<1 month.   Height: 60.1 in / 152.6 cm  IBW: 100 lbs / 45.5 kg +/-10%  BMI: 43.0 kg/m^2 (at most recent weight)    Physical Assessment, per flowsheets:  Edema: 1+ generalized  Pressure Injury: none noted    Estimated Needs:   [X] Recalculated, with consideration for updated anthropometrics, based on ideal body weight 100 lbs / 45.5 kg  7006-0305 kcal daily @30-35 kcal/kg, 54.6-68.25 gm protein daily @1.2-1.5 gm/kg     Previous Nutrition Diagnosis: [X] Mild malnutrition in the context of acute illness, escalated to diagnosis below   New Nutrition Diagnosis: [X] Severe malnutrition in the context of acute illness related to inadequate protein-energy intake as evidenced by </=50% of estimated needs for >/=5 days, >5% weight loss x1 month.     Interventions:   1) Recommend change diet to low sodium to maximize menu item availability; may d/c renal and consistent carbohydrate restrictions. Fluid restriction per medical discretion.  2) Recommend d/c Glucerna, add Ensure Clear 1 PO 2x daily (provides 240 kcal, 8 gm protein per 8oz serving).  3) Encouraged PO intake as tolerated, obtained food preferences and will honor as able.  4) Antiemetic per team. Monitor BMs, adjust bowel regimen as appropriate.  5) Obtain daily weights.    Monitor & Evaluate:  PO intake, tolerance to diet/supplement, nutrition related lab values, weight trends, BMs/GI distress, hydration status, skin integrity.  Alanis Vila, JUAN, CDN #[id number]  Also available on [employer] Teams

## 2023-03-03 NOTE — PROGRESS NOTE ADULT - SUBJECTIVE AND OBJECTIVE BOX
[facility] NEPHROLOGY- PROGRESS NOTE    72 year old Female with history of COPD, CHF presents with weakness. Nephrology consulted for elevated Scr.    Patient with nosebleed overnight and again this morning.    REVIEW OF SYSTEMS:  Gen: no fevers  Cards: no chest pain  Resp: + dyspnea improving  GI: no nausea or vomiting or diarrhea  Vascular: no LE edema    No Known Allergies      Hospital Medications: Medications reviewed      VITALS:  T(F): 98.3 (03-03-23 @ 11:00), Max: 98.3 (03-03-23 @ 11:00)  HR: 58 (03-03-23 @ 11:00)  BP: 119/56 (03-03-23 @ 11:00)  RR: 16 (03-03-23 @ 11:00)  SpO2: 99% (03-03-23 @ 11:00)  Wt(kg): --        PHYSICAL EXAM:    Gen: NAD, calm, L nose packed  Cards: RRR, +S1/S2, no M/G/R  Resp: CTA B/L  GI: soft, NT, ND, NABS  Vascular: no LE edema      LABS:  03-03    142  |  91<L>  |  116<H>  ----------------------------<  81  4.0   |  39<H>  |  3.02<H>    Ca    10.4      03 Mar 2023 06:37  Phos  3.0     03-03  Mg     2.50     03-03      Creatinine Trend: 3.02 <--, 3.22 <--, 3.65 <--, 3.52 <--, 3.40 <--, 3.55 <--, 3.56 <--, 3.41 <--                        8.4    6.42  )-----------( 284      ( 03 Mar 2023 06:37 )             28.8     Urine Studies:

## 2023-03-03 NOTE — PROGRESS NOTE ADULT - SUBJECTIVE AND OBJECTIVE BOX
SUBJECTIVE / OVERNIGHT EVENTS:pt seen and examined, c/o shortness of breath   03-03-23    MEDICATIONS  (STANDING):  aMIOdarone    Tablet 200 milliGRAM(s) Oral daily  budesonide 160 MICROgram(s)/formoterol 4.5 MICROgram(s) Inhaler 2 Puff(s) Inhalation two times a day  dextrose 5%. 1000 milliLiter(s) (100 mL/Hr) IV Continuous <Continuous>  dextrose 5%. 1000 milliLiter(s) (50 mL/Hr) IV Continuous <Continuous>  dextrose 50% Injectable 25 Gram(s) IV Push once  dextrose 50% Injectable 12.5 Gram(s) IV Push once  dextrose 50% Injectable 25 Gram(s) IV Push once  ferrous    sulfate 325 milliGRAM(s) Oral daily  glucagon  Injectable 1 milliGRAM(s) IntraMuscular once  hydrALAZINE 25 milliGRAM(s) Oral three times a day  insulin detemir injectable (LEVEMIR) 20 Unit(s) SubCutaneous at bedtime  insulin lispro (ADMELOG) corrective regimen sliding scale   SubCutaneous three times a day before meals  insulin lispro (ADMELOG) corrective regimen sliding scale   SubCutaneous at bedtime  insulin lispro Injectable (ADMELOG) 5 Unit(s) SubCutaneous three times a day before meals  isosorbide   mononitrate ER Tablet (IMDUR) 30 milliGRAM(s) Oral daily  pantoprazole    Tablet 40 milliGRAM(s) Oral before breakfast  petrolatum white Ointment 1 Application(s) Topical every 6 hours  polyethylene glycol 3350 17 Gram(s) Oral daily  predniSONE   Tablet 5 milliGRAM(s) Oral daily  senna 2 Tablet(s) Oral at bedtime  simvastatin 10 milliGRAM(s) Oral at bedtime  sodium chloride 0.65% Nasal 1 Spray(s) Both Nostrils every 4 hours  torsemide 40 milliGRAM(s) Oral daily    MEDICATIONS  (PRN):  acetaminophen     Tablet .. 650 milliGRAM(s) Oral every 6 hours PRN Temp greater or equal to 38C (100.4F), Mild Pain (1 - 3), Moderate Pain (4 - 6)  albuterol    90 MICROgram(s) HFA Inhaler 2 Puff(s) Inhalation every 6 hours PRN Bronchospasm  aluminum hydroxide/magnesium hydroxide/simethicone Suspension 30 milliLiter(s) Oral every 4 hours PRN Dyspepsia  bisacodyl 5 milliGRAM(s) Oral every 12 hours PRN Constipation  dextrose Oral Gel 15 Gram(s) Oral once PRN Blood Glucose LESS THAN 70 milliGRAM(s)/deciliter  lidocaine   4% Patch 1 Patch Transdermal daily PRN pain  melatonin 3 milliGRAM(s) Oral at bedtime PRN Insomnia  ondansetron Injectable 4 milliGRAM(s) IV Push three times a day PRN Nausea and/or Vomiting    Vital Signs Last 24 Hrs  T(C): 36.5 (03-03-23 @ 21:46), Max: 36.8 (03-03-23 @ 11:00)  T(F): 97.7 (03-03-23 @ 21:46), Max: 98.3 (03-03-23 @ 11:00)  HR: 68 (03-03-23 @ 21:46) (58 - 68)  BP: 111/60 (03-03-23 @ 21:46) (111/60 - 130/49)  BP(mean): --  RR: 18 (03-03-23 @ 21:46) (16 - 19)  SpO2: 97% (03-03-23 @ 21:46) (95% - 100%)            Constitutional: No fever, fatigue  Skin: No rash.  Eyes: No recent vision problems or eye pain.  ENT: No congestion, ear pain, or sore throat.  Cardiovascular: No chest pain or palpation.  Respiratory: sob+  Gastrointestinal: No abdominal pain, nausea, vomiting, or diarrhea.  Genitourinary: No dysuria.  Musculoskeletal: No joint swelling.  Neurologic: No headache.    PHYSICAL EXAM:  GENERAL: NAD  EYES: EOMI, PERRLA  NECK: Supple, No JVD  CHEST/LUNG: dec breath sounds at bases  HEART:  S1 , S2 +  ABDOMEN: soft, bs+  EXTREMITIES:  edema+  NEUROLOGY:alert awake oriented    LABS:  03-03    142  |  91<L>  |  116<H>  ----------------------------<  81  4.0   |  39<H>  |  3.02<H>    Ca    10.4      03 Mar 2023 06:37  Phos  3.0     03-03  Mg     2.50     03-03      Creatinine Trend: 3.02 <--, 3.22 <--, 3.65 <--, 3.52 <--, 3.40 <--, 3.55 <--, 3.56 <--, 3.41 <--                        8.4    6.42  )-----------( 284      ( 03 Mar 2023 06:37 )             28.8     Urine Studies:              PT/INR - ( 03 Mar 2023 06:37 )   PT: 47.0 sec;   INR: 4.00 ratio         PTT - ( 03 Mar 2023 06:37 )  PTT:88.0 sec  Imaging Personally Reviewed:yes    Consultant(s) Notes Reviewed:  yes    Care Discussed with Consultants/Other Providers:yes

## 2023-03-03 NOTE — PROVIDER CONTACT NOTE (OTHER) - NAME OF MD/NP/PA/DO NOTIFIED:
[facility] Chantal Salas [id number]
Rajeev Friend ACP
[place], [facility] [id number]
Darlyn Angeles
MALICK Medrano
Myron TERESA
ACP Chantal Salas
ACP Maritza Cornejo
ACP Thee Sparks
Chantal Salas [facility], #[id number]
Yvette Chase (ACP)
ACP Chantal Salas
ACP PATRICIA STOCK
SIMON uDmont MD
Darlyn Angeles
ACP Isaac Miner [phone]

## 2023-03-03 NOTE — PROVIDER CONTACT NOTE (OTHER) - ACTION/TREATMENT ORDERED:
Provider aware.
MALICK Chase ([id number]) notified. No new orders at this time. Will continue to monitor
Continue to monitor. No new interventions at this time.
EKG
HOLD INSULIN AND STOP HEPARIN GTT
Juice and pretzels with frequent blood glucose check
Provider made aware. Will follow heparin nomogram as per provider. Stop heparin gtt for 60 mins and restart at 10 as per order. Safety maintained. Will continue to monitor.
ACP Maritza made aware. Pt resting comfortably in bed with BiPAP on. Frequent rounding. Call bell within reach. Safety maintained. Will continue to monitor.
Provider aware. To order CBC as well. Notify provider if nosebleed persists.

## 2023-03-04 LAB
ANION GAP SERPL CALC-SCNC: 15 MMOL/L — HIGH (ref 7–14)
APTT BLD: 60.8 SEC — HIGH (ref 27–36.3)
BASOPHILS # BLD AUTO: 0.03 K/UL — SIGNIFICANT CHANGE UP (ref 0–0.2)
BASOPHILS NFR BLD AUTO: 0.4 % — SIGNIFICANT CHANGE UP (ref 0–2)
BUN SERPL-MCNC: 107 MG/DL — HIGH (ref 7–23)
CALCIUM SERPL-MCNC: 10.5 MG/DL — SIGNIFICANT CHANGE UP (ref 8.4–10.5)
CHLORIDE SERPL-SCNC: 91 MMOL/L — LOW (ref 98–107)
CO2 SERPL-SCNC: 36 MMOL/L — HIGH (ref 22–31)
CREAT SERPL-MCNC: 2.98 MG/DL — HIGH (ref 0.5–1.3)
EGFR: 16 ML/MIN/1.73M2 — LOW
EOSINOPHIL # BLD AUTO: 0.07 K/UL — SIGNIFICANT CHANGE UP (ref 0–0.5)
EOSINOPHIL NFR BLD AUTO: 1 % — SIGNIFICANT CHANGE UP (ref 0–6)
GLUCOSE BLDC GLUCOMTR-MCNC: 112 MG/DL — HIGH (ref 70–99)
GLUCOSE BLDC GLUCOMTR-MCNC: 112 MG/DL — HIGH (ref 70–99)
GLUCOSE BLDC GLUCOMTR-MCNC: 84 MG/DL — SIGNIFICANT CHANGE UP (ref 70–99)
GLUCOSE BLDC GLUCOMTR-MCNC: 95 MG/DL — SIGNIFICANT CHANGE UP (ref 70–99)
GLUCOSE SERPL-MCNC: 84 MG/DL — SIGNIFICANT CHANGE UP (ref 70–99)
HCT VFR BLD CALC: 29.7 % — LOW (ref 34.5–45)
HGB BLD-MCNC: 8.5 G/DL — LOW (ref 11.5–15.5)
IANC: 5.08 K/UL — SIGNIFICANT CHANGE UP (ref 1.8–7.4)
IMM GRANULOCYTES NFR BLD AUTO: 0.8 % — SIGNIFICANT CHANGE UP (ref 0–0.9)
INR BLD: 5.42 RATIO — CRITICAL HIGH (ref 0.88–1.16)
LYMPHOCYTES # BLD AUTO: 1.2 K/UL — SIGNIFICANT CHANGE UP (ref 1–3.3)
LYMPHOCYTES # BLD AUTO: 16.4 % — SIGNIFICANT CHANGE UP (ref 13–44)
MAGNESIUM SERPL-MCNC: 2.4 MG/DL — SIGNIFICANT CHANGE UP (ref 1.6–2.6)
MCHC RBC-ENTMCNC: 28.6 GM/DL — LOW (ref 32–36)
MCHC RBC-ENTMCNC: 29.6 PG — SIGNIFICANT CHANGE UP (ref 27–34)
MCV RBC AUTO: 103.5 FL — HIGH (ref 80–100)
MONOCYTES # BLD AUTO: 0.87 K/UL — SIGNIFICANT CHANGE UP (ref 0–0.9)
MONOCYTES NFR BLD AUTO: 11.9 % — SIGNIFICANT CHANGE UP (ref 2–14)
NEUTROPHILS # BLD AUTO: 5.08 K/UL — SIGNIFICANT CHANGE UP (ref 1.8–7.4)
NEUTROPHILS NFR BLD AUTO: 69.5 % — SIGNIFICANT CHANGE UP (ref 43–77)
NRBC # BLD: 0 /100 WBCS — SIGNIFICANT CHANGE UP (ref 0–0)
NRBC # FLD: 0.03 K/UL — HIGH (ref 0–0)
PHOSPHATE SERPL-MCNC: 3.1 MG/DL — SIGNIFICANT CHANGE UP (ref 2.5–4.5)
PLATELET # BLD AUTO: 309 K/UL — SIGNIFICANT CHANGE UP (ref 150–400)
POTASSIUM SERPL-MCNC: 4.6 MMOL/L — SIGNIFICANT CHANGE UP (ref 3.5–5.3)
POTASSIUM SERPL-SCNC: 4.6 MMOL/L — SIGNIFICANT CHANGE UP (ref 3.5–5.3)
PROTHROM AB SERPL-ACNC: 63.9 SEC — HIGH (ref 10.5–13.4)
RBC # BLD: 2.87 M/UL — LOW (ref 3.8–5.2)
RBC # FLD: 14.4 % — SIGNIFICANT CHANGE UP (ref 10.3–14.5)
SODIUM SERPL-SCNC: 142 MMOL/L — SIGNIFICANT CHANGE UP (ref 135–145)
WBC # BLD: 7.31 K/UL — SIGNIFICANT CHANGE UP (ref 3.8–10.5)
WBC # FLD AUTO: 7.31 K/UL — SIGNIFICANT CHANGE UP (ref 3.8–10.5)

## 2023-03-04 RX ADMIN — LIDOCAINE 1 PATCH: 4 CREAM TOPICAL at 07:10

## 2023-03-04 RX ADMIN — LIDOCAINE 1 PATCH: 4 CREAM TOPICAL at 05:09

## 2023-03-04 RX ADMIN — Medication 5 MILLIGRAM(S): at 05:09

## 2023-03-04 RX ADMIN — Medication 650 MILLIGRAM(S): at 12:57

## 2023-03-04 RX ADMIN — Medication 650 MILLIGRAM(S): at 13:20

## 2023-03-04 RX ADMIN — Medication 1 SPRAY(S): at 17:36

## 2023-03-04 RX ADMIN — Medication 1 SPRAY(S): at 10:26

## 2023-03-04 RX ADMIN — Medication 1 SPRAY(S): at 02:31

## 2023-03-04 RX ADMIN — Medication 1 SPRAY(S): at 05:09

## 2023-03-04 RX ADMIN — Medication 1 SPRAY(S): at 12:58

## 2023-03-04 RX ADMIN — Medication 25 MILLIGRAM(S): at 10:26

## 2023-03-04 RX ADMIN — Medication 20 UNIT(S): at 22:48

## 2023-03-04 RX ADMIN — ISOSORBIDE MONONITRATE 30 MILLIGRAM(S): 60 TABLET, EXTENDED RELEASE ORAL at 12:57

## 2023-03-04 RX ADMIN — LIDOCAINE 1 PATCH: 4 CREAM TOPICAL at 17:39

## 2023-03-04 RX ADMIN — BUDESONIDE AND FORMOTEROL FUMARATE DIHYDRATE 2 PUFF(S): 160; 4.5 AEROSOL RESPIRATORY (INHALATION) at 08:46

## 2023-03-04 RX ADMIN — Medication 1 APPLICATION(S): at 12:56

## 2023-03-04 RX ADMIN — Medication 25 MILLIGRAM(S): at 02:31

## 2023-03-04 RX ADMIN — Medication 1 APPLICATION(S): at 06:26

## 2023-03-04 RX ADMIN — PANTOPRAZOLE SODIUM 40 MILLIGRAM(S): 20 TABLET, DELAYED RELEASE ORAL at 05:09

## 2023-03-04 RX ADMIN — BUDESONIDE AND FORMOTEROL FUMARATE DIHYDRATE 2 PUFF(S): 160; 4.5 AEROSOL RESPIRATORY (INHALATION) at 21:32

## 2023-03-04 RX ADMIN — SENNA PLUS 2 TABLET(S): 8.6 TABLET ORAL at 22:48

## 2023-03-04 RX ADMIN — SIMVASTATIN 10 MILLIGRAM(S): 20 TABLET, FILM COATED ORAL at 22:48

## 2023-03-04 RX ADMIN — Medication 1 SPRAY(S): at 21:32

## 2023-03-04 RX ADMIN — AMIODARONE HYDROCHLORIDE 200 MILLIGRAM(S): 400 TABLET ORAL at 05:09

## 2023-03-04 RX ADMIN — Medication 40 MILLIGRAM(S): at 05:10

## 2023-03-04 RX ADMIN — Medication 325 MILLIGRAM(S): at 12:57

## 2023-03-04 RX ADMIN — Medication 25 MILLIGRAM(S): at 17:36

## 2023-03-04 RX ADMIN — Medication 1 APPLICATION(S): at 17:36

## 2023-03-04 NOTE — PROVIDER CONTACT NOTE (CRITICAL VALUE NOTIFICATION) - BACKGROUND
admitted with epistaxis, drop in hemoglobin, subtherapeutic INR
Pt on bipap over night and 3L nc during day has Bipap of 45
73 y/o female with PMHx of Gout, COPD, HTN, DM, CHF, CKD, Afib, Pulm HTN, Mitral and Tricuspid Valve replacement (mechanical valve on warfarin), on 3L home O2 presents to ED
patient admitted for epistaxis, pmh of COPD, CHF, HTN.
Pt admitted for epistaxis and CHF exacerbation

## 2023-03-04 NOTE — PROGRESS NOTE ADULT - SUBJECTIVE AND OBJECTIVE BOX
SUBJECTIVE / OVERNIGHT EVENTS:pt seen and examined, c/o shortness of breath   03-04-23      MEDICATIONS  (STANDING):  aMIOdarone    Tablet 200 milliGRAM(s) Oral daily  budesonide 160 MICROgram(s)/formoterol 4.5 MICROgram(s) Inhaler 2 Puff(s) Inhalation two times a day  dextrose 5%. 1000 milliLiter(s) (100 mL/Hr) IV Continuous <Continuous>  dextrose 5%. 1000 milliLiter(s) (50 mL/Hr) IV Continuous <Continuous>  dextrose 50% Injectable 25 Gram(s) IV Push once  dextrose 50% Injectable 12.5 Gram(s) IV Push once  dextrose 50% Injectable 25 Gram(s) IV Push once  ferrous    sulfate 325 milliGRAM(s) Oral daily  glucagon  Injectable 1 milliGRAM(s) IntraMuscular once  hydrALAZINE 25 milliGRAM(s) Oral three times a day  insulin detemir injectable (LEVEMIR) 20 Unit(s) SubCutaneous at bedtime  insulin lispro (ADMELOG) corrective regimen sliding scale   SubCutaneous three times a day before meals  insulin lispro (ADMELOG) corrective regimen sliding scale   SubCutaneous at bedtime  insulin lispro Injectable (ADMELOG) 5 Unit(s) SubCutaneous three times a day before meals  isosorbide   mononitrate ER Tablet (IMDUR) 30 milliGRAM(s) Oral daily  pantoprazole    Tablet 40 milliGRAM(s) Oral before breakfast  petrolatum white Ointment 1 Application(s) Topical every 6 hours  polyethylene glycol 3350 17 Gram(s) Oral daily  predniSONE   Tablet 5 milliGRAM(s) Oral daily  senna 2 Tablet(s) Oral at bedtime  simvastatin 10 milliGRAM(s) Oral at bedtime  sodium chloride 0.65% Nasal 1 Spray(s) Both Nostrils every 4 hours  torsemide 40 milliGRAM(s) Oral daily    MEDICATIONS  (PRN):  acetaminophen     Tablet .. 650 milliGRAM(s) Oral every 6 hours PRN Temp greater or equal to 38C (100.4F), Mild Pain (1 - 3), Moderate Pain (4 - 6)  albuterol    90 MICROgram(s) HFA Inhaler 2 Puff(s) Inhalation every 6 hours PRN Bronchospasm  aluminum hydroxide/magnesium hydroxide/simethicone Suspension 30 milliLiter(s) Oral every 4 hours PRN Dyspepsia  bisacodyl 5 milliGRAM(s) Oral every 12 hours PRN Constipation  dextrose Oral Gel 15 Gram(s) Oral once PRN Blood Glucose LESS THAN 70 milliGRAM(s)/deciliter  lidocaine   4% Patch 1 Patch Transdermal daily PRN pain  melatonin 3 milliGRAM(s) Oral at bedtime PRN Insomnia  ondansetron Injectable 4 milliGRAM(s) IV Push three times a day PRN Nausea and/or Vomiting    Vital Signs Last 24 Hrs  T(C): 36.7 (03-04-23 @ 16:45), Max: 36.9 (03-04-23 @ 01:58)  T(F): 98 (03-04-23 @ 16:45), Max: 98.4 (03-04-23 @ 01:58)  HR: 61 (03-04-23 @ 16:45) (61 - 62)  BP: 144/54 (03-04-23 @ 16:45) (118/44 - 147/60)  BP(mean): --  RR: 17 (03-04-23 @ 16:45) (16 - 19)  SpO2: 96% (03-04-23 @ 16:45) (95% - 100%)          Constitutional: No fever, fatigue  Skin: No rash.  Eyes: No recent vision problems or eye pain.  ENT: No congestion, ear pain, or sore throat.  Cardiovascular: No chest pain or palpation.  Respiratory: sob+  Gastrointestinal: No abdominal pain, nausea, vomiting, or diarrhea.  Genitourinary: No dysuria.  Musculoskeletal: No joint swelling.  Neurologic: No headache.    PHYSICAL EXAM:  GENERAL: NAD  EYES: EOMI, PERRLA  NECK: Supple, No JVD  CHEST/LUNG: dec breath sounds at bases  HEART:  S1 , S2 +  ABDOMEN: soft, bs+  EXTREMITIES:  edema+  NEUROLOGY:alert awake oriented    LABS:  03-04    142  |  91<L>  |  107<H>  ----------------------------<  84  4.6   |  36<H>  |  2.98<H>    Ca    10.5      04 Mar 2023 06:00  Phos  3.1     03-04  Mg     2.40     03-04      Creatinine Trend: 2.98 <--, 3.02 <--, 3.22 <--, 3.65 <--, 3.52 <--, 3.40 <--, 3.55 <--, 3.56 <--                        8.5    7.31  )-----------( 309      ( 04 Mar 2023 06:00 )             29.7     Urine Studies:              PT/INR - ( 04 Mar 2023 11:10 )   PT: 63.9 sec;   INR: 5.42 ratio         PTT - ( 04 Mar 2023 11:10 )  PTT:60.8 sec  Imaging Personally Reviewed:yes    Consultant(s) Notes Reviewed:  yes    Care Discussed with Consultants/Other Providers:yes

## 2023-03-04 NOTE — PROGRESS NOTE ADULT - SUBJECTIVE AND OBJECTIVE BOX
PATIENT SEEN AND EXAMINED ON :- 3/4/23  DATE OF SERVICE:     3/4/23        Interim events noted,Labs ,Radiological studies and Cardiology tests reviewed .    MR#[id number]  PATIENT NAME:-[facility] COURSE: HPI:  71 y/o female with PMHx of Gout, COPD, HTN, DM, CHF, CKD, Afib, Pulm HTN, 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 Patient was recently here for COPD exacerbation and discharged home with therapeutic INR. She was in a relatively baseline state of health and uses 3L NC at home until this PM she started having diffuse epistaxis for about 30 minutes. She states it was jacey blood and she used up almost 1 roll of paper towels. She states she has been taking her medication consistently and able to state that she has taken her warfarin 5mg this AM. Denies chest pain, dyspnea, diarrhea, fever, chills, cough, URI symptoms, headaches. She does have chronic dizziness. Denies melena or hematochezia.  In the ED, NC3L, 168/69. Epistaxis stopped. INR subtherapeutic to 1~. Hgb to ~8 from 10 from last admission. (04 Feb 2023 23:34)      INTERIM EVENTS:Patient seen at bedside ,interim events noted.      [facility] -reviewed admission note, no change since admission  HEART FAILURE: Acute[ ]Chronic[ ] Systolic[ ] Diastolic[ ] Combined Systolic and Diastolic[ ]  CAD[ ] CABG[ ] PCI[ ]  DEVICES[ ] PPM[ ] ICD[ ] ILR[ ]  ATRIAL FIBRILLATION[ ] Paroxysmal[ ] Permanent[ ] CHADS2-[  ]  MISAEL[ ] CKD1[ ] CKD2[ ] CKD3[ ] CKD4[ ] ESRD[ ]  COPD[ ] HTN[ ]   DM[ ] Type1[ ] Type 2[ ]   CVA[ ] Paresis[ ]    AMBULATION: Assisted[ ] Cane/walker[ ] Independent[ ]    MEDICATIONS  (STANDING):  aMIOdarone    Tablet 200 milliGRAM(s) Oral daily  budesonide 160 MICROgram(s)/formoterol 4.5 MICROgram(s) Inhaler 2 Puff(s) Inhalation two times a day  dextrose 5%. 1000 milliLiter(s) (50 mL/Hr) IV Continuous <Continuous>  dextrose 5%. 1000 milliLiter(s) (100 mL/Hr) IV Continuous <Continuous>  dextrose 50% Injectable 25 Gram(s) IV Push once  dextrose 50% Injectable 12.5 Gram(s) IV Push once  dextrose 50% Injectable 25 Gram(s) IV Push once  ferrous    sulfate 325 milliGRAM(s) Oral daily  glucagon  Injectable 1 milliGRAM(s) IntraMuscular once  hydrALAZINE 25 milliGRAM(s) Oral three times a day  insulin detemir injectable (LEVEMIR) 20 Unit(s) SubCutaneous at bedtime  insulin lispro (ADMELOG) corrective regimen sliding scale   SubCutaneous three times a day before meals  insulin lispro (ADMELOG) corrective regimen sliding scale   SubCutaneous at bedtime  insulin lispro Injectable (ADMELOG) 5 Unit(s) SubCutaneous three times a day before meals  isosorbide   mononitrate ER Tablet (IMDUR) 30 milliGRAM(s) Oral daily  pantoprazole    Tablet 40 milliGRAM(s) Oral before breakfast  petrolatum white Ointment 1 Application(s) Topical every 6 hours  polyethylene glycol 3350 17 Gram(s) Oral daily  predniSONE   Tablet 5 milliGRAM(s) Oral daily  senna 2 Tablet(s) Oral at bedtime  simvastatin 10 milliGRAM(s) Oral at bedtime  sodium chloride 0.65% Nasal 1 Spray(s) Both Nostrils every 4 hours  torsemide 40 milliGRAM(s) Oral daily    MEDICATIONS  (PRN):  acetaminophen     Tablet .. 650 milliGRAM(s) Oral every 6 hours PRN Temp greater or equal to 38C (100.4F), Mild Pain (1 - 3), Moderate Pain (4 - 6)  albuterol    90 MICROgram(s) HFA Inhaler 2 Puff(s) Inhalation every 6 hours PRN Bronchospasm  aluminum hydroxide/magnesium hydroxide/simethicone Suspension 30 milliLiter(s) Oral every 4 hours PRN Dyspepsia  bisacodyl 5 milliGRAM(s) Oral every 12 hours PRN Constipation  dextrose Oral Gel 15 Gram(s) Oral once PRN Blood Glucose LESS THAN 70 milliGRAM(s)/deciliter  lidocaine   4% Patch 1 Patch Transdermal daily PRN pain  melatonin 3 milliGRAM(s) Oral at bedtime PRN Insomnia  ondansetron Injectable 4 milliGRAM(s) IV Push three times a day PRN Nausea and/or Vomiting            REVIEW OF SYSTEMS:  Constitutional: [ ] fever, [ ]weight loss,  [ ]fatigue [ ]weight gain  Eyes: [ ] visual changes  Respiratory: [ ]shortness of breath;  [ ] cough, [ ]wheezing, [ ]chills, [ ]hemoptysis  Cardiovascular: [ ] chest pain, [ ]palpitations, [ ]dizziness,  [ ]leg swelling[ ]orthopnea[ ]PND  Gastrointestinal: [ ] abdominal pain, [ ]nausea, [ ]vomiting,  [ ]diarrhea [ ]Constipation [ ]Melena  Genitourinary: [ ] dysuria, [ ] hematuria [ ]Junior  Neurologic: [ ] headaches [ ] tremors[ ]weakness [ ]Paralysis Right[ ] Left[ ]  Skin: [ ] itching, [ ]burning, [ ] rashes  Endocrine: [ ] heat or cold intolerance  Musculoskeletal: [ ] joint pain or swelling; [ ] muscle, back, or extremity pain  Psychiatric: [ ] depression, [ ]anxiety, [ ]mood swings, or [ ]difficulty sleeping  Hematologic: [ ] easy bruising, [ ] bleeding gums    [ ] All remaining systems negative except as per above.   [ ]Unable to obtain.  [x] No change in ROS since admission      Vital Signs Last 24 Hrs  T(C): 36.7 (04 Mar 2023 12:25), Max: 36.9 (04 Mar 2023 01:58)  T(F): 98 (04 Mar 2023 12:25), Max: 98.4 (04 Mar 2023 01:58)  HR: 62 (04 Mar 2023 12:25) (62 - 68)  BP: 147/60 (04 Mar 2023 12:25) (111/60 - 147/60)  BP(mean): --  RR: 18 (04 Mar 2023 12:25) (16 - 19)  SpO2: 98% (04 Mar 2023 12:25) (95% - 100%)    Parameters below as of 04 Mar 2023 12:25  Patient On (Oxygen Delivery Method): nasal cannula  O2 Flow (L/min): 3    I&O's Summary      PHYSICAL EXAM:  General: No acute distress BMI-  HEENT: EOMI, PERRL  Neck: Supple, [ ] JVD  Lungs: Equal air entry bilaterally; [ ] rales [ ] wheezing [ ] rhonchi  Heart: Regular rate and rhythm; [x ] murmur   2/6 [ x] systolic [ ] diastolic [ ] radiation[ ] rubs [ ]  gallops  Abdomen: Nontender, bowel sounds present  Extremities: No clubbing, cyanosis, [ ] edema [ ]Pulses  equal and intact  Nervous system:  Alert & Oriented X3, no focal deficits  Psychiatric: Normal affect  Skin: No rashes or lesions    LABS:  03-04    142  |  91<L>  |  107<H>  ----------------------------<  84  4.6   |  36<H>  |  2.98<H>    Ca    10.5      04 Mar 2023 06:00  Phos  3.1     03-04  Mg     2.40     03-04      Creatinine Trend: 2.98<--, 3.02<--, 3.22<--, 3.65<--, 3.52<--, 3.40<--                        8.5    7.31  )-----------( 309      ( 04 Mar 2023 06:00 )             29.7     PT/INR - ( 04 Mar 2023 11:10 )   PT: 63.9 sec;   INR: 5.42 ratio         PTT - ( 04 Mar 2023 11:10 )  PTT:60.8 sec

## 2023-03-04 NOTE — PROGRESS NOTE ADULT - SUBJECTIVE AND OBJECTIVE BOX
[facility] NEPHROLOGY- PROGRESS NOTE    72 year old Female with history of COPD, CHF presents with weakness. Nephrology consulted for elevated Scr.  3/3: Patient with nosebleed overnight and again this morning.    Pt reports breathing well today    REVIEW OF SYSTEMS:  Gen: no fevers  Cards: no chest pain  Resp: + dyspnea improving  GI: no nausea or vomiting or diarrhea  Vascular: no LE edema    No Known Allergies      Hospital Medications: Medications reviewed      VITALS:  T(F): 98 (03-04-23 @ 16:45), Max: 98.4 (03-04-23 @ 01:58)  HR: 61 (03-04-23 @ 16:45)  BP: 144/54 (03-04-23 @ 16:45)  RR: 17 (03-04-23 @ 16:45)  SpO2: 96% (03-04-23 @ 16:45)    Weight (kg): 101.4 (03-04 @ 05:06)        PHYSICAL EXAM:    Gen: NAD, calm, L nose packed  Cards: RRR, +S1/S2, no M/G/R  Resp: CTA B/L  GI: soft, NT, ND, NABS  Vascular: no LE edema      LABS:  03-04    142  |  91<L>  |  107<H>  ----------------------------<  84  4.6   |  36<H>  |  2.98<H>    Ca    10.5      04 Mar 2023 06:00  Phos  3.1     03-04  Mg     2.40     03-04      Creatinine Trend: 2.98 <--, 3.02 <--, 3.22 <--, 3.65 <--, 3.52 <--, 3.40 <--, 3.55 <--, 3.56 <--                        8.5    7.31  )-----------( 309      ( 04 Mar 2023 06:00 )             29.7

## 2023-03-04 NOTE — PROVIDER CONTACT NOTE (CRITICAL VALUE NOTIFICATION) - ASSESSMENT
patient reports no changes in well being, denies dizziness, headache, SOB, or any pain, no signs and symptoms of distress noted on assessment.
patient troponin level is 68 . denies any chest pain .
Pateint has no c/o of SOB or discomfort
aptt >200. Epistaxis, provider aware.
Pt resting comfortably in bed with oxygen 3L; no signs of distress at this time; Safety maintained

## 2023-03-04 NOTE — PROVIDER CONTACT NOTE (CRITICAL VALUE NOTIFICATION) - RECOMMENDATIONS
ACP aware no orders at this time.
PA made aware and continue monitoring
please assess for any further intervention.
continue to monitor for now.
Inform provider.
follow heparin nomogram
GERI Crawford notified; waiting further instructions

## 2023-03-05 LAB
ANION GAP SERPL CALC-SCNC: 14 MMOL/L — SIGNIFICANT CHANGE UP (ref 7–14)
APTT BLD: 50.6 SEC — HIGH (ref 27–36.3)
APTT BLD: 63.5 SEC — HIGH (ref 27–36.3)
BASOPHILS # BLD AUTO: 0.02 K/UL — SIGNIFICANT CHANGE UP (ref 0–0.2)
BASOPHILS NFR BLD AUTO: 0.3 % — SIGNIFICANT CHANGE UP (ref 0–2)
BUN SERPL-MCNC: 113 MG/DL — HIGH (ref 7–23)
CALCIUM SERPL-MCNC: 10.8 MG/DL — HIGH (ref 8.4–10.5)
CHLORIDE SERPL-SCNC: 90 MMOL/L — LOW (ref 98–107)
CO2 SERPL-SCNC: 37 MMOL/L — HIGH (ref 22–31)
CREAT SERPL-MCNC: 3.16 MG/DL — HIGH (ref 0.5–1.3)
EGFR: 15 ML/MIN/1.73M2 — LOW
EOSINOPHIL # BLD AUTO: 0.15 K/UL — SIGNIFICANT CHANGE UP (ref 0–0.5)
EOSINOPHIL NFR BLD AUTO: 1.9 % — SIGNIFICANT CHANGE UP (ref 0–6)
GLUCOSE BLDC GLUCOMTR-MCNC: 107 MG/DL — HIGH (ref 70–99)
GLUCOSE BLDC GLUCOMTR-MCNC: 112 MG/DL — HIGH (ref 70–99)
GLUCOSE BLDC GLUCOMTR-MCNC: 117 MG/DL — HIGH (ref 70–99)
GLUCOSE BLDC GLUCOMTR-MCNC: 86 MG/DL — SIGNIFICANT CHANGE UP (ref 70–99)
GLUCOSE SERPL-MCNC: 81 MG/DL — SIGNIFICANT CHANGE UP (ref 70–99)
HCT VFR BLD CALC: 29.7 % — LOW (ref 34.5–45)
HGB BLD-MCNC: 8.6 G/DL — LOW (ref 11.5–15.5)
IANC: 5.71 K/UL — SIGNIFICANT CHANGE UP (ref 1.8–7.4)
IMM GRANULOCYTES NFR BLD AUTO: 0.9 % — SIGNIFICANT CHANGE UP (ref 0–0.9)
INR BLD: 6.83 RATIO — CRITICAL HIGH (ref 0.88–1.16)
INR BLD: 7.19 RATIO — CRITICAL HIGH (ref 0.88–1.16)
LYMPHOCYTES # BLD AUTO: 1.22 K/UL — SIGNIFICANT CHANGE UP (ref 1–3.3)
LYMPHOCYTES # BLD AUTO: 15.3 % — SIGNIFICANT CHANGE UP (ref 13–44)
MAGNESIUM SERPL-MCNC: 2.3 MG/DL — SIGNIFICANT CHANGE UP (ref 1.6–2.6)
MCHC RBC-ENTMCNC: 29 GM/DL — LOW (ref 32–36)
MCHC RBC-ENTMCNC: 29.1 PG — SIGNIFICANT CHANGE UP (ref 27–34)
MCV RBC AUTO: 100.3 FL — HIGH (ref 80–100)
MONOCYTES # BLD AUTO: 0.8 K/UL — SIGNIFICANT CHANGE UP (ref 0–0.9)
MONOCYTES NFR BLD AUTO: 10 % — SIGNIFICANT CHANGE UP (ref 2–14)
NEUTROPHILS # BLD AUTO: 5.71 K/UL — SIGNIFICANT CHANGE UP (ref 1.8–7.4)
NEUTROPHILS NFR BLD AUTO: 71.6 % — SIGNIFICANT CHANGE UP (ref 43–77)
NRBC # BLD: 0 /100 WBCS — SIGNIFICANT CHANGE UP (ref 0–0)
NRBC # FLD: 0.02 K/UL — HIGH (ref 0–0)
PHOSPHATE SERPL-MCNC: 2.8 MG/DL — SIGNIFICANT CHANGE UP (ref 2.5–4.5)
PLATELET # BLD AUTO: 310 K/UL — SIGNIFICANT CHANGE UP (ref 150–400)
POTASSIUM SERPL-MCNC: 3.9 MMOL/L — SIGNIFICANT CHANGE UP (ref 3.5–5.3)
POTASSIUM SERPL-SCNC: 3.9 MMOL/L — SIGNIFICANT CHANGE UP (ref 3.5–5.3)
PROTHROM AB SERPL-ACNC: 80.8 SEC — HIGH (ref 10.5–13.4)
PROTHROM AB SERPL-ACNC: 85.1 SEC — HIGH (ref 10.5–13.4)
RBC # BLD: 2.96 M/UL — LOW (ref 3.8–5.2)
RBC # FLD: 14.8 % — HIGH (ref 10.3–14.5)
SODIUM SERPL-SCNC: 141 MMOL/L — SIGNIFICANT CHANGE UP (ref 135–145)
WBC # BLD: 7.97 K/UL — SIGNIFICANT CHANGE UP (ref 3.8–10.5)
WBC # FLD AUTO: 7.97 K/UL — SIGNIFICANT CHANGE UP (ref 3.8–10.5)

## 2023-03-05 RX ORDER — PHYTONADIONE (VIT K1) 5 MG
2.5 TABLET ORAL ONCE
Refills: 0 | Status: COMPLETED | OUTPATIENT
Start: 2023-03-05 | End: 2023-03-05

## 2023-03-05 RX ADMIN — Medication 325 MILLIGRAM(S): at 11:47

## 2023-03-05 RX ADMIN — Medication 1 APPLICATION(S): at 11:47

## 2023-03-05 RX ADMIN — SIMVASTATIN 10 MILLIGRAM(S): 20 TABLET, FILM COATED ORAL at 21:40

## 2023-03-05 RX ADMIN — AMIODARONE HYDROCHLORIDE 200 MILLIGRAM(S): 400 TABLET ORAL at 05:46

## 2023-03-05 RX ADMIN — BUDESONIDE AND FORMOTEROL FUMARATE DIHYDRATE 2 PUFF(S): 160; 4.5 AEROSOL RESPIRATORY (INHALATION) at 21:41

## 2023-03-05 RX ADMIN — Medication 25 MILLIGRAM(S): at 21:42

## 2023-03-05 RX ADMIN — LIDOCAINE 1 PATCH: 4 CREAM TOPICAL at 21:41

## 2023-03-05 RX ADMIN — Medication 1 SPRAY(S): at 21:42

## 2023-03-05 RX ADMIN — Medication 20 UNIT(S): at 21:40

## 2023-03-05 RX ADMIN — PANTOPRAZOLE SODIUM 40 MILLIGRAM(S): 20 TABLET, DELAYED RELEASE ORAL at 06:54

## 2023-03-05 RX ADMIN — Medication 1 APPLICATION(S): at 00:58

## 2023-03-05 RX ADMIN — Medication 25 MILLIGRAM(S): at 18:55

## 2023-03-05 RX ADMIN — Medication 40 MILLIGRAM(S): at 05:46

## 2023-03-05 RX ADMIN — Medication 5 MILLIGRAM(S): at 05:46

## 2023-03-05 RX ADMIN — Medication 1 APPLICATION(S): at 05:45

## 2023-03-05 RX ADMIN — Medication 25 MILLIGRAM(S): at 11:51

## 2023-03-05 RX ADMIN — ISOSORBIDE MONONITRATE 30 MILLIGRAM(S): 60 TABLET, EXTENDED RELEASE ORAL at 11:47

## 2023-03-05 RX ADMIN — Medication 1 SPRAY(S): at 00:58

## 2023-03-05 RX ADMIN — Medication 2.5 MILLIGRAM(S): at 21:38

## 2023-03-05 RX ADMIN — Medication 1 SPRAY(S): at 05:45

## 2023-03-05 RX ADMIN — POLYETHYLENE GLYCOL 3350 17 GRAM(S): 17 POWDER, FOR SOLUTION ORAL at 11:47

## 2023-03-05 RX ADMIN — Medication 25 MILLIGRAM(S): at 00:59

## 2023-03-05 NOTE — PROGRESS NOTE ADULT - TIME BILLING
- Review of records, telemetry, vital signs and daily labs.   - General and cardiovascular physical examination.  - Generation of cardiovascular treatment plan.  - Coordination of care.      Patient was seen and examined by me on 3/2/23,interim events noted,labs and radiology studies reviewed.  Edwar Merida MD,FACC.  [address] [place], [place] [id number].  [phone]
- Review of records, telemetry, vital signs and daily labs.   - General and cardiovascular physical examination.  - Generation of cardiovascular treatment plan.  - Coordination of care.      Patient was seen and examined by me on 3/4/23,interim events noted,labs and radiology studies reviewed.  Edwar Merida MD,FACC.  [address] [place], [place] [id number].  [phone]
- Review of records, telemetry, vital signs and daily labs.   - General and cardiovascular physical examination.  - Generation of cardiovascular treatment plan.  - Coordination of care.      Patient was seen and examined by me on 3/2/23,interim events noted,labs and radiology studies reviewed.  Edwar Merida MD,FACC.  [address] [place], [place] [id number].  [phone]
- Review of records, telemetry, vital signs and daily labs.   - General and cardiovascular physical examination.  - Generation of cardiovascular treatment plan.  - Coordination of care.      Patient was seen and examined by me on 3/5/23,interim events noted,labs and radiology studies reviewed.  Edwar Merida MD,FACC.  [address] [place], [place] [id number].  [phone]
- Review of records, telemetry, vital signs and daily labs.   - General and cardiovascular physical examination.  - Generation of cardiovascular treatment plan.  - Coordination of care.      Patient was seen and examined by me on 2/27/23interim events noted,labs and radiology studies reviewed.  Edwar Merida MD,FACC.  [address] [place], [place] [id number].  [phone]
- Review of records, telemetry, vital signs and daily labs.   - General and cardiovascular physical examination.  - Generation of cardiovascular treatment plan.  - Coordination of care.      Patient was seen and examined by me on 2/28/23interim events noted,labs and radiology studies reviewed.  Edwar Merida MD,FACC.  [address] [place], [place] [id number].  [phone]
- Review of records, telemetry, vital signs and daily labs.   - General and cardiovascular physical examination.  - Generation of cardiovascular treatment plan.  - Coordination of care.      Patient was seen and examined by me on3/1/23interim events noted,labs and radiology studies reviewed.  Edwar Merida MD,FACC.  [address] [place], [place] [id number].  [phone]

## 2023-03-05 NOTE — PROVIDER CONTACT NOTE (CRITICAL VALUE NOTIFICATION) - TEST AND RESULT REPORTED:
PT/INR
Venous Blood Gas Bicarbonate: 46
Bicarb level 45
troponin level is 68
PT 62.9 and INR 5.33
PT/INR
Troponin 84
Ptt 63.9, INR 5.42
aptt >200

## 2023-03-05 NOTE — PROGRESS NOTE ADULT - SUBJECTIVE AND OBJECTIVE BOX
SUBJECTIVE / OVERNIGHT EVENTS:pt seen and examined, c/o shortness of breath   03-05-23    MEDICATIONS  (STANDING):  aMIOdarone    Tablet 200 milliGRAM(s) Oral daily  budesonide 160 MICROgram(s)/formoterol 4.5 MICROgram(s) Inhaler 2 Puff(s) Inhalation two times a day  dextrose 5%. 1000 milliLiter(s) (100 mL/Hr) IV Continuous <Continuous>  dextrose 5%. 1000 milliLiter(s) (50 mL/Hr) IV Continuous <Continuous>  dextrose 50% Injectable 25 Gram(s) IV Push once  dextrose 50% Injectable 12.5 Gram(s) IV Push once  dextrose 50% Injectable 25 Gram(s) IV Push once  ferrous    sulfate 325 milliGRAM(s) Oral daily  glucagon  Injectable 1 milliGRAM(s) IntraMuscular once  hydrALAZINE 25 milliGRAM(s) Oral three times a day  insulin detemir injectable (LEVEMIR) 20 Unit(s) SubCutaneous at bedtime  insulin lispro (ADMELOG) corrective regimen sliding scale   SubCutaneous three times a day before meals  insulin lispro (ADMELOG) corrective regimen sliding scale   SubCutaneous at bedtime  insulin lispro Injectable (ADMELOG) 5 Unit(s) SubCutaneous three times a day before meals  isosorbide   mononitrate ER Tablet (IMDUR) 30 milliGRAM(s) Oral daily  pantoprazole    Tablet 40 milliGRAM(s) Oral before breakfast  petrolatum white Ointment 1 Application(s) Topical every 6 hours  polyethylene glycol 3350 17 Gram(s) Oral daily  predniSONE   Tablet 5 milliGRAM(s) Oral daily  senna 2 Tablet(s) Oral at bedtime  simvastatin 10 milliGRAM(s) Oral at bedtime  sodium chloride 0.65% Nasal 1 Spray(s) Both Nostrils every 4 hours  torsemide 40 milliGRAM(s) Oral daily    MEDICATIONS  (PRN):  acetaminophen     Tablet .. 650 milliGRAM(s) Oral every 6 hours PRN Temp greater or equal to 38C (100.4F), Mild Pain (1 - 3), Moderate Pain (4 - 6)  albuterol    90 MICROgram(s) HFA Inhaler 2 Puff(s) Inhalation every 6 hours PRN Bronchospasm  aluminum hydroxide/magnesium hydroxide/simethicone Suspension 30 milliLiter(s) Oral every 4 hours PRN Dyspepsia  bisacodyl 5 milliGRAM(s) Oral every 12 hours PRN Constipation  dextrose Oral Gel 15 Gram(s) Oral once PRN Blood Glucose LESS THAN 70 milliGRAM(s)/deciliter  lidocaine   4% Patch 1 Patch Transdermal daily PRN pain  melatonin 3 milliGRAM(s) Oral at bedtime PRN Insomnia  ondansetron Injectable 4 milliGRAM(s) IV Push three times a day PRN Nausea and/or Vomiting    Vital Signs Last 24 Hrs  T(C): 36.8 (03-05-23 @ 21:45), Max: 37.1 (03-05-23 @ 05:47)  T(F): 98.2 (03-05-23 @ 21:45), Max: 98.7 (03-05-23 @ 05:47)  HR: 65 (03-05-23 @ 21:45) (52 - 67)  BP: 121/56 (03-05-23 @ 21:45) (119/57 - 134/54)  BP(mean): --  RR: 18 (03-05-23 @ 21:45) (18 - 19)  SpO2: 95% (03-05-23 @ 21:45) (56% - 100%)            Constitutional: No fever, fatigue  Skin: No rash.  Eyes: No recent vision problems or eye pain.  ENT: No congestion, ear pain, or sore throat.  Cardiovascular: No chest pain or palpation.  Respiratory: sob+  Gastrointestinal: No abdominal pain, nausea, vomiting, or diarrhea.  Genitourinary: No dysuria.  Musculoskeletal: No joint swelling.  Neurologic: No headache.    PHYSICAL EXAM:  GENERAL: NAD  EYES: EOMI, PERRLA  NECK: Supple, No JVD  CHEST/LUNG: dec breath sounds at bases  HEART:  S1 , S2 +  ABDOMEN: soft, bs+  EXTREMITIES:  edema+  NEUROLOGY:alert awake oriented    LABS:  03-05    141  |  90<L>  |  113<H>  ----------------------------<  81  3.9   |  37<H>  |  3.16<H>    Ca    10.8<H>      05 Mar 2023 07:41  Phos  2.8     03-05  Mg     2.30     03-05      Creatinine Trend: 3.16 <--, 2.98 <--, 3.02 <--, 3.22 <--, 3.65 <--, 3.52 <--, 3.40 <--, 3.55 <--                        8.6    7.97  )-----------( 310      ( 05 Mar 2023 07:41 )             29.7     Urine Studies:              PT/INR - ( 05 Mar 2023 17:54 )   PT: 85.1 sec;   INR: 7.19 ratio         PTT - ( 05 Mar 2023 17:54 )  PTT:63.5 sec          PTT - ( 04 Mar 2023 11:10 )  PTT:60.8 sec  Imaging Personally Reviewed:yes    Consultant(s) Notes Reviewed:  yes    Care Discussed with Consultants/Other Providers:yes

## 2023-03-05 NOTE — PROGRESS NOTE ADULT - PROBLEM SELECTOR PLAN 4
Takes levemir 25U qhs at home.   titrate insulin for optimal blood sugar control
Takes levemir 25U qhs at home. Resume  -ISS with mealtime cut to half
Takes levemir 25U qhs at home.   titrate insulin for optimal blood sugar control
Takes levemir 25U qhs at home. Resume  -ISS with mealtime cut to half
Takes levemir 25U qhs at home.   titrate insulin for optimal blood sugar control
Takes levemir 25U qhs at home. Resume  -ISS with mealtime cut to half
Takes levemir 25U qhs at home. Resume  -ISS with mealtime cut to half
Takes levemir 25U qhs at home.   titrate insulin for optimal blood sugar control
Takes levemir 25U qhs at home. Resume  -ISS with mealtime cut to half
Takes levemir 25U qhs at home.   titrate insulin for optimal blood sugar control
Takes levemir 25U qhs at home. Resume  -ISS with mealtime cut to half
Takes levemir 25U qhs at home.   titrate insulin for optimal blood sugar control
Takes levemir 25U qhs at home. Resume  -ISS with mealtime cut to half
Takes levemir 25U qhs at home.   titrate insulin for optimal blood sugar control
Takes levemir 25U qhs at home.   titrate insulin for optimal blood sugar control

## 2023-03-05 NOTE — PROGRESS NOTE ADULT - PROBLEM SELECTOR PLAN 8
FENP: No IVF, Lytes PRN, Regular diet, heparin
Dispo: d/c planning
FENP: No IVF, Lytes PRN, Regular diet, heparin
FENP: No IVF, Lytes PRN, Regular diet, heparin
Dispo: d/c planning
FENP: No IVF, Lytes PRN, Regular diet, heparin
Dispo: d/c planning
FENP: No IVF, Lytes PRN, Regular diet, heparin

## 2023-03-05 NOTE — PROGRESS NOTE ADULT - PROBLEM SELECTOR PLAN 3
- on heparin gtt to coumadin bridge  - Coumadin held 3/4 (goal 2.5-3.5) - (INR: 5.42)
Currently subtherapeutic INR. High stroke risk. No recent changes in med. Has been on amiodarone for a long time. There is concern for adherence  -Will resume anticoagulation due to high stroke risk from mechanical valve. Informed patient of risks and benefits of using AC. Patient agreeable and expressed understanding  - cont ac   =
Currently subtherapeutic INR. High stroke risk. No recent changes in med. Has been on amiodarone for a long time. There is concern for adherence  -Will resume anticoagulation due to high stroke risk from mechanical valve. Informed patient of risks and benefits of using AC. Patient agreeable and expressed understanding  - cont ac   =
No wheezing  -Resume home 3L NC, prednisone, inhalers
improved co2 with bipap   pulm f/u   pt non compliant with bipap at night time
Currently subtherapeutic INR. High stroke risk. No recent changes in med. Has been on amiodarone for a long time. There is concern for adherence  -Will resume anticoagulation due to high stroke risk from mechanical valve. Informed patient of risks and benefits of using AC. Patient agreeable and expressed understanding  - cont ac   =
Currently subtherapeutic INR. High stroke risk. No recent changes in med. Has been on amiodarone for a long time. There is concern for adherence  -Will resume anticoagulation due to high stroke risk from mechanical valve. Informed patient of risks and benefits of using AC. Patient agreeable and expressed understanding  - cont ac   =
- on heparin gtt to coumadin bridge  - Coumadin held 3/4 (goal 2.5-3.5) - (INR: 5.42)
Currently subtherapeutic INR. High stroke risk. No recent changes in med. Has been on amiodarone for a long time. There is concern for adherence  -Will resume anticoagulation due to high stroke risk from mechanical valve. Informed patient of risks and benefits of using AC. Patient agreeable and expressed understanding  - cont ac   =
Currently subtherapeutic INR. High stroke risk. No recent changes in med. Has been on amiodarone for a long time. There is concern for adherence  -Will resume anticoagulation due to high stroke risk from mechanical valve. Informed patient of risks and benefits of using AC. Patient agreeable and expressed understanding  - cont ac   =
No wheezing  -Resume home 3L NC, prednisone, inhalers
improved co2 with bipap   pulm f/u   pt non compliant with bipap at night time
- on heparin gtt to coumadin bridge  - Coumadin dosed 5mg on 3/2 (goal 2.5-3.5) - (INR: 2.49)
Currently subtherapeutic INR. High stroke risk. No recent changes in med. Has been on amiodarone for a long time. There is concern for adherence  -Will resume anticoagulation due to high stroke risk from mechanical valve. Informed patient of risks and benefits of using AC. Patient agreeable and expressed understanding  - cont ac   =
No wheezing  -Resume home 3L NC, prednisone, inhalers
Currently subtherapeutic INR. High stroke risk. No recent changes in med. Has been on amiodarone for a long time. There is concern for adherence  -Will resume anticoagulation due to high stroke risk from mechanical valve. Informed patient of risks and benefits of using AC. Patient agreeable and expressed understanding  - cont ac   =
- on heparin gtt to coumadin bridge  - Coumadin dosed 5mg on 3/2 (goal 2.5-3.5) - (INR: 2.49)
Currently subtherapeutic INR. High stroke risk. No recent changes in med. Has been on amiodarone for a long time. There is concern for adherence  -Will resume anticoagulation due to high stroke risk from mechanical valve. Informed patient of risks and benefits of using AC. Patient agreeable and expressed understanding  - cont ac   =
No wheezing  -Resume home 3L NC, prednisone, inhalers
Currently subtherapeutic INR. High stroke risk. No recent changes in med. Has been on amiodarone for a long time. There is concern for adherence  -Will resume anticoagulation due to high stroke risk from mechanical valve. Informed patient of risks and benefits of using AC. Patient agreeable and expressed understanding  - cont ac   =
No wheezing  -Resume home 3L NC, prednisone, inhalers
improved co2 with bipap   pulm f/u   pt non compliant with bipap at night time
Currently subtherapeutic INR. High stroke risk. No recent changes in med. Has been on amiodarone for a long time. There is concern for adherence  -Will resume anticoagulation due to high stroke risk from mechanical valve. Informed patient of risks and benefits of using AC. Patient agreeable and expressed understanding  - cont ac   =
improved co2 with bipap   pulm f/u   pt non compliant with bipap at night time
Currently subtherapeutic INR. High stroke risk. No recent changes in med. Has been on amiodarone for a long time. There is concern for adherence  -Will resume anticoagulation due to high stroke risk from mechanical valve. Informed patient of risks and benefits of using AC. Patient agreeable and expressed understanding  - cont ac
No wheezing  -Resume home 3L NC, prednisone, inhalers
Currently subtherapeutic INR. High stroke risk. No recent changes in med. Has been on amiodarone for a long time. There is concern for adherence  -Will resume anticoagulation due to high stroke risk from mechanical valve. Informed patient of risks and benefits of using AC. Patient agreeable and expressed understanding  - cont ac   =
Currently subtherapeutic INR. High stroke risk. No recent changes in med. Has been on amiodarone for a long time. There is concern for adherence  -Will resume anticoagulation due to high stroke risk from mechanical valve. Informed patient of risks and benefits of using AC. Patient agreeable and expressed understanding  - cont ac   =
Currently subtherapeutic INR. High stroke risk. No recent changes in med. Has been on amiodarone for a long time. There is concern for adherence  -Will resume anticoagulation due to high stroke risk from mechanical valve. Informed patient of risks and benefits of using AC. Patient agreeable and expressed understanding  - cont ac

## 2023-03-05 NOTE — PROGRESS NOTE ADULT - PROBLEM SELECTOR PROBLEM 7
Chronic atrial fibrillation

## 2023-03-05 NOTE — PROGRESS NOTE ADULT - PROBLEM SELECTOR PROBLEM 2
Epistaxis
History of mitral valve replacement with mechanical valve
Epistaxis
History of mitral valve replacement with mechanical valve
History of mitral valve replacement with mechanical valve
Epistaxis
History of mitral valve replacement with mechanical valve
Epistaxis
History of mitral valve replacement with mechanical valve
History of mitral valve replacement with mechanical valve
Epistaxis
Epistaxis
History of mitral valve replacement with mechanical valve
Epistaxis
History of mitral valve replacement with mechanical valve
Epistaxis
History of mitral valve replacement with mechanical valve
History of mitral valve replacement with mechanical valve
Epistaxis
Epistaxis
History of mitral valve replacement with mechanical valve
Epistaxis
Epistaxis
History of mitral valve replacement with mechanical valve

## 2023-03-05 NOTE — PROGRESS NOTE ADULT - SUBJECTIVE AND OBJECTIVE BOX
PATIENT SEEN AND EXAMINED ON :- 3/5/23  DATE OF SERVICE:    3/5/23         Interim events noted,Labs ,Radiological studies and Cardiology tests reviewed .    MR#[id number]  PATIENT NAME:-[facility] COURSE: HPI:  71 y/o female with PMHx of Gout, COPD, HTN, DM, CHF, CKD, Afib, Pulm HTN, 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 Patient was recently here for COPD exacerbation and discharged home with therapeutic INR. She was in a relatively baseline state of health and uses 3L NC at home until this PM she started having diffuse epistaxis for about 30 minutes. She states it was jacey blood and she used up almost 1 roll of paper towels. She states she has been taking her medication consistently and able to state that she has taken her warfarin 5mg this AM. Denies chest pain, dyspnea, diarrhea, fever, chills, cough, URI symptoms, headaches. She does have chronic dizziness. Denies melena or hematochezia.  In the ED, NC3L, 168/69. Epistaxis stopped. INR subtherapeutic to 1~. Hgb to ~8 from 10 from last admission. (04 Feb 2023 23:34)      INTERIM EVENTS:Patient seen at bedside ,interim events noted.      [facility] -reviewed admission note, no change since admission  HEART FAILURE: Acute[ ]Chronic[ ] Systolic[ ] Diastolic[ ] Combined Systolic and Diastolic[ ]  CAD[ ] CABG[ ] PCI[ ]  DEVICES[ ] PPM[ ] ICD[ ] ILR[ ]  ATRIAL FIBRILLATION[ ] Paroxysmal[ ] Permanent[ ] CHADS2-[  ]  MISAEL[ ] CKD1[ ] CKD2[ ] CKD3[ ] CKD4[ ] ESRD[ ]  COPD[ ] HTN[ ]   DM[ ] Type1[ ] Type 2[ ]   CVA[ ] Paresis[ ]    AMBULATION: Assisted[ ] Cane/walker[ ] Independent[ ]    MEDICATIONS  (STANDING):  aMIOdarone    Tablet 200 milliGRAM(s) Oral daily  budesonide 160 MICROgram(s)/formoterol 4.5 MICROgram(s) Inhaler 2 Puff(s) Inhalation two times a day  dextrose 5%. 1000 milliLiter(s) (50 mL/Hr) IV Continuous <Continuous>  dextrose 5%. 1000 milliLiter(s) (100 mL/Hr) IV Continuous <Continuous>  dextrose 50% Injectable 25 Gram(s) IV Push once  dextrose 50% Injectable 12.5 Gram(s) IV Push once  dextrose 50% Injectable 25 Gram(s) IV Push once  ferrous    sulfate 325 milliGRAM(s) Oral daily  glucagon  Injectable 1 milliGRAM(s) IntraMuscular once  hydrALAZINE 25 milliGRAM(s) Oral three times a day  insulin detemir injectable (LEVEMIR) 20 Unit(s) SubCutaneous at bedtime  insulin lispro (ADMELOG) corrective regimen sliding scale   SubCutaneous three times a day before meals  insulin lispro (ADMELOG) corrective regimen sliding scale   SubCutaneous at bedtime  insulin lispro Injectable (ADMELOG) 5 Unit(s) SubCutaneous three times a day before meals  isosorbide   mononitrate ER Tablet (IMDUR) 30 milliGRAM(s) Oral daily  pantoprazole    Tablet 40 milliGRAM(s) Oral before breakfast  petrolatum white Ointment 1 Application(s) Topical every 6 hours  phytonadione   Solution 2.5 milliGRAM(s) Oral once  polyethylene glycol 3350 17 Gram(s) Oral daily  predniSONE   Tablet 5 milliGRAM(s) Oral daily  senna 2 Tablet(s) Oral at bedtime  simvastatin 10 milliGRAM(s) Oral at bedtime  sodium chloride 0.65% Nasal 1 Spray(s) Both Nostrils every 4 hours  torsemide 40 milliGRAM(s) Oral daily    MEDICATIONS  (PRN):  acetaminophen     Tablet .. 650 milliGRAM(s) Oral every 6 hours PRN Temp greater or equal to 38C (100.4F), Mild Pain (1 - 3), Moderate Pain (4 - 6)  albuterol    90 MICROgram(s) HFA Inhaler 2 Puff(s) Inhalation every 6 hours PRN Bronchospasm  aluminum hydroxide/magnesium hydroxide/simethicone Suspension 30 milliLiter(s) Oral every 4 hours PRN Dyspepsia  bisacodyl 5 milliGRAM(s) Oral every 12 hours PRN Constipation  dextrose Oral Gel 15 Gram(s) Oral once PRN Blood Glucose LESS THAN 70 milliGRAM(s)/deciliter  lidocaine   4% Patch 1 Patch Transdermal daily PRN pain  melatonin 3 milliGRAM(s) Oral at bedtime PRN Insomnia  ondansetron Injectable 4 milliGRAM(s) IV Push three times a day PRN Nausea and/or Vomiting            REVIEW OF SYSTEMS:  Constitutional: [ ] fever, [ ]weight loss,  [ ]fatigue [ ]weight gain  Eyes: [ ] visual changes  Respiratory: [ ]shortness of breath;  [ ] cough, [ ]wheezing, [ ]chills, [ ]hemoptysis  Cardiovascular: [ ] chest pain, [ ]palpitations, [ ]dizziness,  [ ]leg swelling[ ]orthopnea[ ]PND  Gastrointestinal: [ ] abdominal pain, [ ]nausea, [ ]vomiting,  [ ]diarrhea [ ]Constipation [ ]Melena  Genitourinary: [ ] dysuria, [ ] hematuria [ ]Junior  Neurologic: [ ] headaches [ ] tremors[ ]weakness [ ]Paralysis Right[ ] Left[ ]  Skin: [ ] itching, [ ]burning, [ ] rashes  Endocrine: [ ] heat or cold intolerance  Musculoskeletal: [ ] joint pain or swelling; [ ] muscle, back, or extremity pain  Psychiatric: [ ] depression, [ ]anxiety, [ ]mood swings, or [ ]difficulty sleeping  Hematologic: [ ] easy bruising, [ ] bleeding gums    [ ] All remaining systems negative except as per above.   [ ]Unable to obtain.  [x] No change in ROS since admission      Vital Signs Last 24 Hrs  T(C): 36.4 (05 Mar 2023 18:00), Max: 37.1 (05 Mar 2023 05:47)  T(F): 97.6 (05 Mar 2023 18:00), Max: 98.7 (05 Mar 2023 05:47)  HR: 67 (05 Mar 2023 18:00) (52 - 67)  BP: 133/54 (05 Mar 2023 18:00) (119/57 - 138/57)  BP(mean): --  RR: 19 (05 Mar 2023 18:00) (19 - 19)  SpO2: 100% (05 Mar 2023 18:00) (56% - 100%)    Parameters below as of 05 Mar 2023 18:00  Patient On (Oxygen Delivery Method): nasal cannula  O2 Flow (L/min): 3    I&O's Summary    05 Mar 2023 07:01  -  05 Mar 2023 19:17  --------------------------------------------------------  IN: 118 mL / OUT: 0 mL / NET: 118 mL        PHYSICAL EXAM:  General: No acute distress BMI-  HEENT: EOMI, PERRL  Neck: Supple, [ ] JVD  Lungs: Equal air entry bilaterally; [ ] rales [ ] wheezing [ ] rhonchi  Heart: Regular rate and rhythm; [x ] murmur   2/6 [ x] systolic [ ] diastolic [ ] radiation[ ] rubs [ ]  gallops  Abdomen: Nontender, bowel sounds present  Extremities: No clubbing, cyanosis, [ ] edema [ ]Pulses  equal and intact  Nervous system:  Alert & Oriented X3, no focal deficits  Psychiatric: Normal affect  Skin: No rashes or lesions    LABS:  03-05    141  |  90<L>  |  113<H>  ----------------------------<  81  3.9   |  37<H>  |  3.16<H>    Ca    10.8<H>      05 Mar 2023 07:41  Phos  2.8     03-05  Mg     2.30     03-05      Creatinine Trend: 3.16<--, 2.98<--, 3.02<--, 3.22<--, 3.65<--, 3.52<--                        8.6    7.97  )-----------( 310      ( 05 Mar 2023 07:41 )             29.7     PT/INR - ( 05 Mar 2023 17:54 )   PT: 85.1 sec;   INR: 7.19 ratio         PTT - ( 05 Mar 2023 17:54 )  PTT:63.5 sec

## 2023-03-05 NOTE — PROGRESS NOTE ADULT - PROBLEM SELECTOR PLAN 2
Currently epistaxis resolved. Subtherapeutic INR  - now improved
Currently subtherapeutic INR. High stroke risk. No recent changes in med. Has been on amiodarone for a long time. There is concern for adherence  -Will resume anticoagulation due to high stroke risk from mechanical valve. Informed patient of risks and benefits of using AC. Patient agreeable and expressed understanding  - cont ac   waiting for dc after INR therapeutic
Currently subtherapeutic INR. High stroke risk. No recent changes in med. Has been on amiodarone for a long time. There is concern for adherence  -Will resume anticoagulation due to high stroke risk from mechanical valve. Informed patient of risks and benefits of using AC. Patient agreeable and expressed understanding  -Heparin drip and resuming home warfarin with extra 5mg tonight
Currently epistaxis resolved. Subtherapeutic INR  - now improved
Currently subtherapeutic INR. High stroke risk. No recent changes in med. Has been on amiodarone for a long time. There is concern for adherence  -Will resume anticoagulation due to high stroke risk from mechanical valve. Informed patient of risks and benefits of using AC. Patient agreeable and expressed understanding  - cont ac   waiting for dc after INR therapeutic
Currently subtherapeutic INR. High stroke risk. No recent changes in med. Has been on amiodarone for a long time. There is concern for adherence  -Will resume anticoagulation due to high stroke risk from mechanical valve. Informed patient of risks and benefits of using AC. Patient agreeable and expressed understanding  - cont ac   waiting for dc after INR therapeutic
Currently subtherapeutic INR. High stroke risk. No recent changes in med. Has been on amiodarone for a long time. There is concern for adherence  -Will resume anticoagulation due to high stroke risk from mechanical valve. Informed patient of risks and benefits of using AC. Patient agreeable and expressed understanding  -Heparin drip and resuming home warfarin with extra 5mg tonight
Currently subtherapeutic INR. High stroke risk. No recent changes in med. Has been on amiodarone for a long time. There is concern for adherence  -Will resume anticoagulation due to high stroke risk from mechanical valve. Informed patient of risks and benefits of using AC. Patient agreeable and expressed understanding  -Heparin drip and resuming home warfarin with extra 5mg tonight
Currently epistaxis resolved. Subtherapeutic INR  - now improved
Currently subtherapeutic INR. High stroke risk. No recent changes in med. Has been on amiodarone for a long time. There is concern for adherence  -Will resume anticoagulation due to high stroke risk from mechanical valve. Informed patient of risks and benefits of using AC. Patient agreeable and expressed understanding  - cont ac   waiting for dc after INR therapeutic
Currently epistaxis resolved. Subtherapeutic INR  - now improved
Currently epistaxis resolved. Subtherapeutic INR  - now improved
Currently subtherapeutic INR. High stroke risk. No recent changes in med. Has been on amiodarone for a long time. There is concern for adherence  -Will resume anticoagulation due to high stroke risk from mechanical valve. Informed patient of risks and benefits of using AC. Patient agreeable and expressed understanding  - cont ac   waiting for dc after INR therapeutic
Currently epistaxis resolved. Subtherapeutic INR  - now improved
Currently subtherapeutic INR. High stroke risk. No recent changes in med. Has been on amiodarone for a long time. There is concern for adherence  -Will resume anticoagulation due to high stroke risk from mechanical valve. Informed patient of risks and benefits of using AC. Patient agreeable and expressed understanding  - cont ac   waiting for dc after INR therapeutic
Currently epistaxis resolved. Subtherapeutic INR  - now improved
Currently subtherapeutic INR. High stroke risk. No recent changes in med. Has been on amiodarone for a long time. There is concern for adherence  -Will resume anticoagulation due to high stroke risk from mechanical valve. Informed patient of risks and benefits of using AC. Patient agreeable and expressed understanding  - cont ac   waiting for dc after INR therapeutic
Currently subtherapeutic INR. High stroke risk. No recent changes in med. Has been on amiodarone for a long time. There is concern for adherence  -Will resume anticoagulation due to high stroke risk from mechanical valve. Informed patient of risks and benefits of using AC. Patient agreeable and expressed understanding  -Heparin drip + coumadin
Currently epistaxis resolved. Subtherapeutic INR  - now improved
Currently subtherapeutic INR. High stroke risk. No recent changes in med. Has been on amiodarone for a long time. There is concern for adherence  -Will resume anticoagulation due to high stroke risk from mechanical valve. Informed patient of risks and benefits of using AC. Patient agreeable and expressed understanding  -Heparin drip + coumadin
Currently epistaxis resolved. Subtherapeutic INR  - now improved

## 2023-03-05 NOTE — PROGRESS NOTE ADULT - PROBLEM SELECTOR PLAN 6
Currently euvolemic
Currently euvolemic  cont diuresis  HF f/u
pt has acute on chronic diastolic chf
pt has acute on chronic diastolic chf
Currently euvolemic
Currently euvolemic
Currently euvolemic  cont diuresis  HF f/u
Currently euvolemic
Currently euvolemic
Currently euvolemic  cont diuresis  HF f/u
Currently euvolemic
Currently euvolemic  cont diuresis  HF f/u
Currently euvolemic
Currently euvolemic
Currently euvolemic  cont diuresis  HF f/u
pt has acute on chronic diastolic chf
Currently euvolemic
pt has acute on chronic diastolic chf
Currently euvolemic
pt has acute on chronic diastolic chf
Currently euvolemic
Currently euvolemic  cont diuresis  HF f/u
Currently euvolemic
Currently euvolemic  cont diuresis  HF f/u
pt has acute on chronic diastolic chf
Currently euvolemic

## 2023-03-05 NOTE — PROVIDER CONTACT NOTE (CRITICAL VALUE NOTIFICATION) - SITUATION
Notified provider of critical VBG result.
Patient's Ptt 63.9, INR 5.42
Troponin 84
PT 85.1/ INR 6.83,
PT PT 80.8, INR 6.83, MD aware. No bleeding at this  time. Per ACP, continue marcelel monitor.
Patient HX of COPD has bicarb of 45
patient troponin level is 68 .
Critical lab value of patient: PT 62.9 and INR 5.33
aptt >200

## 2023-03-05 NOTE — PROVIDER CONTACT NOTE (CRITICAL VALUE NOTIFICATION) - PERSON GIVING RESULT:
Jose FUENTES
Zuleika; DILMA Jang
LAb/JERMAINE Rosario
MARIA DEL CARMEN Rodas
Lab/ Gloo
hematology
Claribel Salgado
Rocio Guillen
Nancy

## 2023-03-05 NOTE — PROGRESS NOTE ADULT - PROBLEM SELECTOR PROBLEM 3
History of mitral valve replacement with mechanical valve
History of mitral valve replacement with mechanical valve
COPD, severe
History of mitral valve replacement with mechanical valve
History of mitral valve replacement with mechanical valve
COPD, severe
History of mitral valve replacement with mechanical valve
COPD, severe
History of mitral valve replacement with mechanical valve
History of mitral valve replacement with mechanical valve
COPD, severe
History of mitral valve replacement with mechanical valve
COPD, severe
History of mitral valve replacement with mechanical valve
COPD, severe
History of mitral valve replacement with mechanical valve

## 2023-03-05 NOTE — PROGRESS NOTE ADULT - SUBJECTIVE AND OBJECTIVE BOX
[facility] NEPHROLOGY- PROGRESS NOTE    72 year old Female with history of COPD, CHF presents with weakness. Nephrology consulted for elevated Scr.  3/3: Patient with nosebleed overnight and again this morning.    Pt reports breathing well today- asked to go home!    REVIEW OF SYSTEMS:  Gen: no fevers  Cards: no chest pain  Resp: + dyspnea improving  GI: no nausea or vomiting or diarrhea  Vascular: no LE edema    No Known Allergies      Hospital Medications: Medications reviewed      VITALS:  T(F): 97.6 (03-05-23 @ 18:00), Max: 98.7 (03-05-23 @ 05:47)  HR: 67 (03-05-23 @ 18:00)  BP: 133/54 (03-05-23 @ 18:00)  RR: 19 (03-05-23 @ 18:00)  SpO2: 100% (03-05-23 @ 18:00)    03-05 @ 07:01  -  03-05 @ 19:02  --------------------------------------------------------  IN: 118 mL / OUT: 0 mL / NET: 118 mL          PHYSICAL EXAM:  Gen: NAD, calm, L nose packed  Cards: RRR, +S1/S2, no M/G/R  Resp: CTA B/L  GI: soft, NT, ND, NABS  Vascular: no LE edema      LABS:  03-05    141  |  90<L>  |  113<H>  ----------------------------<  81  3.9   |  37<H>  |  3.16<H>    Ca    10.8<H>      05 Mar 2023 07:41  Phos  2.8     03-05  Mg     2.30     03-05      Creatinine Trend: 3.16 <--, 2.98 <--, 3.02 <--, 3.22 <--, 3.65 <--, 3.52 <--, 3.40 <--, 3.55 <--                        8.6    7.97  )-----------( 310      ( 05 Mar 2023 07:41 )             29.7

## 2023-03-05 NOTE — PROGRESS NOTE ADULT - PROBLEM SELECTOR PROBLEM 5
Hypertension

## 2023-03-05 NOTE — PROVIDER CONTACT NOTE (CRITICAL VALUE NOTIFICATION) - ACTION/TREATMENT ORDERED:
Provider aware. follow heparin nomogram
continue to monitor.
No new interventions at this time.
ACP will evaluate prev. test results
PA made aware and continue monitoring
Acp states she will look through chart
NO orders at this time. ACP aware.
GERI Crawford made aware; no action ordered; possibly vitamin K administration; Safety maintained

## 2023-03-05 NOTE — PROGRESS NOTE ADULT - PROBLEM SELECTOR PROBLEM 6
CHF (congestive heart failure)

## 2023-03-05 NOTE — PROVIDER CONTACT NOTE (CRITICAL VALUE NOTIFICATION) - NAME OF MD/NP/PA/DO NOTIFIED:
MALICK Hood
GERI Crawford
MALICK Heard
Joy Redd, ACP
[facility] [id number]
[facility] Chantal Salas [id number]
GERI Gray
Yvette Chase (ACP)
Myron Mendez

## 2023-03-05 NOTE — PROGRESS NOTE ADULT - PROBLEM SELECTOR PROBLEM 4
Type 2 diabetes mellitus

## 2023-03-05 NOTE — PROGRESS NOTE ADULT - PROBLEM SELECTOR PLAN 5
Controlled  -Resume home anti-HTN

## 2023-03-05 NOTE — PROGRESS NOTE ADULT - PROBLEM SELECTOR PROBLEM 1
COPD, severe
Epistaxis
COPD, severe
Epistaxis
CHF (congestive heart failure)
COPD, severe
COPD, severe
Epistaxis
Epistaxis
CHF (congestive heart failure)
Epistaxis
COPD, severe
Epistaxis
Epistaxis
COPD, severe
Epistaxis
COPD, severe
COPD, severe
Epistaxis
COPD, severe
Epistaxis

## 2023-03-05 NOTE — PROGRESS NOTE ADULT - PROBLEM SELECTOR PLAN 7
Rate controlled  Currently on AC

## 2023-03-05 NOTE — PROGRESS NOTE ADULT - NUTRITIONAL ASSESSMENT
This patient has been assessed with a concern for Malnutrition and has been determined to have a diagnosis/diagnoses of Mild protein-calorie malnutrition and Morbid obesity (BMI > 40).    This patient is being managed with:   Diet Regular-  Consistent Carbohydrate {Evening Snack} (CSTCHOSN)  1000mL Fluid Restriction ([id number])  For patients receiving Renal Replacement - No Protein Restr No Conc K No Conc Phos Low Sodium (RENAL)  Supplement Feeding Modality:  Oral  Glucerna Shake Cans or Servings Per Day:  1       Frequency:  Daily  Entered: Feb 27 2023  6:54PM    
This patient has been assessed with a concern for Malnutrition and has been determined to have a diagnosis/diagnoses of Mild protein-calorie malnutrition and Morbid obesity (BMI > 40).    This patient is being managed with:   Diet Regular-  Consistent Carbohydrate {Evening Snack} (CSTCHOSN)  For patients receiving Renal Replacement - No Protein Restr No Conc K No Conc Phos Low Sodium (RENAL)  Supplement Feeding Modality:  Oral  Glucerna Shake Cans or Servings Per Day:  1       Frequency:  Daily  Entered: Feb 25 2023  8:45AM    
This patient has been assessed with a concern for Malnutrition and has been determined to have a diagnosis/diagnoses of Mild protein-calorie malnutrition and Morbid obesity (BMI > 40).    This patient is being managed with:   Diet Consistent Carbohydrate/No Snacks-  No Concentrated Potassium  Low Sodium  Supplement Feeding Modality:  Oral  Ensure Max Cans or Servings Per Day:  1       Frequency:  Daily  Entered: Feb 17 2023  1:39PM    
This patient has been assessed with a concern for Malnutrition and has been determined to have a diagnosis/diagnoses of Mild protein-calorie malnutrition and Morbid obesity (BMI > 40).    This patient is being managed with:   Diet Consistent Carbohydrate/No Snacks-  No Concentrated Potassium  Low Sodium  Supplement Feeding Modality:  Oral  Ensure Max Cans or Servings Per Day:  1       Frequency:  Daily  Entered: Feb 17 2023  1:39PM    
This patient has been assessed with a concern for Malnutrition and has been determined to have a diagnosis/diagnoses of Mild protein-calorie malnutrition and Morbid obesity (BMI > 40).    This patient is being managed with:   Diet Regular-  No Concentrated Potassium  No Concentrated Phosphorus  Low Sodium  Supplement Feeding Modality:  Oral  Ensure Max Cans or Servings Per Day:  1       Frequency:  Daily  Entered: Feb 22 2023  1:27PM    
This patient has been assessed with a concern for Malnutrition and has been determined to have a diagnosis/diagnoses of Mild protein-calorie malnutrition and Morbid obesity (BMI > 40).    This patient is being managed with:   Diet Consistent Carbohydrate/No Snacks-  Low Sodium  Supplement Feeding Modality:  Oral  Ensure Max Cans or Servings Per Day:  1       Frequency:  Daily  Entered: Feb 14 2023  5:10PM    
This patient has been assessed with a concern for Malnutrition and has been determined to have a diagnosis/diagnoses of Mild protein-calorie malnutrition and Morbid obesity (BMI > 40).    This patient is being managed with:   Diet Consistent Carbohydrate/No Snacks-  No Concentrated Potassium  Low Sodium  Supplement Feeding Modality:  Oral  Ensure Max Cans or Servings Per Day:  1       Frequency:  Daily  Entered: Feb 17 2023  1:39PM    
This patient has been assessed with a concern for Malnutrition and has been determined to have a diagnosis/diagnoses of Mild protein-calorie malnutrition and Morbid obesity (BMI > 40).    This patient is being managed with:   Diet Consistent Carbohydrate/No Snacks-  No Concentrated Potassium  Low Sodium  Supplement Feeding Modality:  Oral  Ensure Max Cans or Servings Per Day:  1       Frequency:  Daily  Entered: Feb 17 2023  1:39PM    
This patient has been assessed with a concern for Malnutrition and has been determined to have a diagnosis/diagnoses of Mild protein-calorie malnutrition and Morbid obesity (BMI > 40).    This patient is being managed with:   Diet Regular-  Consistent Carbohydrate {Evening Snack} (CSTCHOSN)  For patients receiving Renal Replacement - No Protein Restr No Conc K No Conc Phos Low Sodium (RENAL)  Supplement Feeding Modality:  Oral  Glucerna Shake Cans or Servings Per Day:  1       Frequency:  Daily  Entered: Feb 25 2023  8:45AM    
This patient has been assessed with a concern for Malnutrition and has been determined to have a diagnosis/diagnoses of Mild protein-calorie malnutrition and Morbid obesity (BMI > 40).    This patient is being managed with:   Diet Regular-  Consistent Carbohydrate {Evening Snack} (CSTCHOSN)  1000mL Fluid Restriction ([id number])  For patients receiving Renal Replacement - No Protein Restr No Conc K No Conc Phos Low Sodium (RENAL)  Supplement Feeding Modality:  Oral  Glucerna Shake Cans or Servings Per Day:  1       Frequency:  Daily  Entered: Feb 27 2023  6:54PM    
This patient has been assessed with a concern for Malnutrition and has been determined to have a diagnosis/diagnoses of Mild protein-calorie malnutrition and Morbid obesity (BMI > 40).    This patient is being managed with:   Diet Consistent Carbohydrate/No Snacks-  Low Sodium  Supplement Feeding Modality:  Oral  Ensure Max Cans or Servings Per Day:  1       Frequency:  Daily  Entered: Feb 14 2023  5:10PM    
This patient has been assessed with a concern for Malnutrition and has been determined to have a diagnosis/diagnoses of Mild protein-calorie malnutrition and Morbid obesity (BMI > 40).    This patient is being managed with:   Diet Full Liquid-  No Concentrated Potassium  No Concentrated Phosphorus  Low Sodium  Supplement Feeding Modality:  Oral  Ensure Max Cans or Servings Per Day:  1       Frequency:  Daily  Entered: Feb 23 2023 12:00PM    
This patient has been assessed with a concern for Malnutrition and has been determined to have a diagnosis/diagnoses of Severe protein-calorie malnutrition and Morbid obesity (BMI > 40).    This patient is being managed with:   Diet Regular-  Soft and Bite Sized (SOFTBTSZ)  1000mL Fluid Restriction ([id number])  For patients receiving Renal Replacement - No Protein Restr No Conc K No Conc Phos Low Sodium (RENAL)  Supplement Feeding Modality:  Oral  Glucerna Shake Cans or Servings Per Day:  1       Frequency:  Two Times a day  Entered: Mar  3 2023  4:03PM    
This patient has been assessed with a concern for Malnutrition and has been determined to have a diagnosis/diagnoses of Mild protein-calorie malnutrition and Morbid obesity (BMI > 40).    This patient is being managed with:   Diet Consistent Carbohydrate/No Snacks-  Low Sodium  Supplement Feeding Modality:  Oral  Ensure Max Cans or Servings Per Day:  1       Frequency:  Daily  Entered: Feb 14 2023  5:10PM    
This patient has been assessed with a concern for Malnutrition and has been determined to have a diagnosis/diagnoses of Mild protein-calorie malnutrition and Morbid obesity (BMI > 40).    This patient is being managed with:   Diet Consistent Carbohydrate/No Snacks-  No Concentrated Potassium  Low Sodium  Supplement Feeding Modality:  Oral  Ensure Max Cans or Servings Per Day:  1       Frequency:  Daily  Entered: Feb 17 2023  1:39PM    
This patient has been assessed with a concern for Malnutrition and has been determined to have a diagnosis/diagnoses of Mild protein-calorie malnutrition and Morbid obesity (BMI > 40).    This patient is being managed with:   Diet Regular-  Consistent Carbohydrate {Evening Snack} (CSTCHOSN)  1000mL Fluid Restriction ([id number])  For patients receiving Renal Replacement - No Protein Restr No Conc K No Conc Phos Low Sodium (RENAL)  Supplement Feeding Modality:  Oral  Glucerna Shake Cans or Servings Per Day:  1       Frequency:  Daily  Entered: Feb 27 2023  6:54PM    
This patient has been assessed with a concern for Malnutrition and has been determined to have a diagnosis/diagnoses of Severe protein-calorie malnutrition and Morbid obesity (BMI > 40).    This patient is being managed with:   Diet Regular-  Soft and Bite Sized (SOFTBTSZ)  1000mL Fluid Restriction ([id number])  For patients receiving Renal Replacement - No Protein Restr No Conc K No Conc Phos Low Sodium (RENAL)  Supplement Feeding Modality:  Oral  Glucerna Shake Cans or Servings Per Day:  1       Frequency:  Two Times a day  Entered: Mar  3 2023  4:03PM    
This patient has been assessed with a concern for Malnutrition and has been determined to have a diagnosis/diagnoses of Severe protein-calorie malnutrition and Morbid obesity (BMI > 40).    This patient is being managed with:   Diet Regular-  Soft and Bite Sized (SOFTBTSZ)  1000mL Fluid Restriction ([id number])  For patients receiving Renal Replacement - No Protein Restr No Conc K No Conc Phos Low Sodium (RENAL)  Supplement Feeding Modality:  Oral  Glucerna Shake Cans or Servings Per Day:  1       Frequency:  Two Times a day  Entered: Mar  3 2023  4:03PM    
This patient has been assessed with a concern for Malnutrition and has been determined to have a diagnosis/diagnoses of Mild protein-calorie malnutrition and Morbid obesity (BMI > 40).    This patient is being managed with:   Diet Full Liquid-  No Concentrated Potassium  No Concentrated Phosphorus  Low Sodium  Supplement Feeding Modality:  Oral  Ensure Max Cans or Servings Per Day:  1       Frequency:  Daily  Entered: Feb 23 2023 12:00PM    
This patient has been assessed with a concern for Malnutrition and has been determined to have a diagnosis/diagnoses of Mild protein-calorie malnutrition and Morbid obesity (BMI > 40).    This patient is being managed with:   Diet Regular-  Consistent Carbohydrate {Evening Snack} (CSTCHOSN)  1000mL Fluid Restriction ([id number])  For patients receiving Renal Replacement - No Protein Restr No Conc K No Conc Phos Low Sodium (RENAL)  Supplement Feeding Modality:  Oral  Glucerna Shake Cans or Servings Per Day:  1       Frequency:  Daily  Entered: Feb 27 2023  6:54PM

## 2023-03-06 ENCOUNTER — TRANSCRIPTION ENCOUNTER (OUTPATIENT)
Age: 73
End: 2023-03-06

## 2023-03-06 VITALS
HEART RATE: 77 BPM | RESPIRATION RATE: 18 BRPM | DIASTOLIC BLOOD PRESSURE: 50 MMHG | SYSTOLIC BLOOD PRESSURE: 115 MMHG | OXYGEN SATURATION: 95 % | TEMPERATURE: 98 F

## 2023-03-06 LAB
ANION GAP SERPL CALC-SCNC: 18 MMOL/L — HIGH (ref 7–14)
APTT BLD: 50.7 SEC — HIGH (ref 27–36.3)
BASOPHILS # BLD AUTO: 0.04 K/UL — SIGNIFICANT CHANGE UP (ref 0–0.2)
BASOPHILS NFR BLD AUTO: 0.5 % — SIGNIFICANT CHANGE UP (ref 0–2)
BUN SERPL-MCNC: 114 MG/DL — HIGH (ref 7–23)
CALCIUM SERPL-MCNC: 11.1 MG/DL — HIGH (ref 8.4–10.5)
CHLORIDE SERPL-SCNC: 90 MMOL/L — LOW (ref 98–107)
CO2 SERPL-SCNC: 34 MMOL/L — HIGH (ref 22–31)
CREAT SERPL-MCNC: 3.52 MG/DL — HIGH (ref 0.5–1.3)
EGFR: 13 ML/MIN/1.73M2 — LOW
EOSINOPHIL # BLD AUTO: 0.15 K/UL — SIGNIFICANT CHANGE UP (ref 0–0.5)
EOSINOPHIL NFR BLD AUTO: 1.8 % — SIGNIFICANT CHANGE UP (ref 0–6)
GLUCOSE BLDC GLUCOMTR-MCNC: 116 MG/DL — HIGH (ref 70–99)
GLUCOSE BLDC GLUCOMTR-MCNC: 140 MG/DL — HIGH (ref 70–99)
GLUCOSE SERPL-MCNC: 118 MG/DL — HIGH (ref 70–99)
HCT VFR BLD CALC: 32.5 % — LOW (ref 34.5–45)
HGB BLD-MCNC: 9.4 G/DL — LOW (ref 11.5–15.5)
IANC: 5.38 K/UL — SIGNIFICANT CHANGE UP (ref 1.8–7.4)
IMM GRANULOCYTES NFR BLD AUTO: 1.5 % — HIGH (ref 0–0.9)
INR BLD: 2.75 RATIO — HIGH (ref 0.88–1.16)
LYMPHOCYTES # BLD AUTO: 1.6 K/UL — SIGNIFICANT CHANGE UP (ref 1–3.3)
LYMPHOCYTES # BLD AUTO: 19.6 % — SIGNIFICANT CHANGE UP (ref 13–44)
MAGNESIUM SERPL-MCNC: 2.2 MG/DL — SIGNIFICANT CHANGE UP (ref 1.6–2.6)
MCHC RBC-ENTMCNC: 28.9 GM/DL — LOW (ref 32–36)
MCHC RBC-ENTMCNC: 29.4 PG — SIGNIFICANT CHANGE UP (ref 27–34)
MCV RBC AUTO: 101.6 FL — HIGH (ref 80–100)
MONOCYTES # BLD AUTO: 0.89 K/UL — SIGNIFICANT CHANGE UP (ref 0–0.9)
MONOCYTES NFR BLD AUTO: 10.9 % — SIGNIFICANT CHANGE UP (ref 2–14)
NEUTROPHILS # BLD AUTO: 5.38 K/UL — SIGNIFICANT CHANGE UP (ref 1.8–7.4)
NEUTROPHILS NFR BLD AUTO: 65.7 % — SIGNIFICANT CHANGE UP (ref 43–77)
NRBC # BLD: 0 /100 WBCS — SIGNIFICANT CHANGE UP (ref 0–0)
NRBC # FLD: 0.02 K/UL — HIGH (ref 0–0)
PHOSPHATE SERPL-MCNC: 2.3 MG/DL — LOW (ref 2.5–4.5)
PLATELET # BLD AUTO: 336 K/UL — SIGNIFICANT CHANGE UP (ref 150–400)
POTASSIUM SERPL-MCNC: 3.6 MMOL/L — SIGNIFICANT CHANGE UP (ref 3.5–5.3)
POTASSIUM SERPL-SCNC: 3.6 MMOL/L — SIGNIFICANT CHANGE UP (ref 3.5–5.3)
PROTHROM AB SERPL-ACNC: 32.2 SEC — HIGH (ref 10.5–13.4)
RBC # BLD: 3.2 M/UL — LOW (ref 3.8–5.2)
RBC # FLD: 14.8 % — HIGH (ref 10.3–14.5)
SODIUM SERPL-SCNC: 142 MMOL/L — SIGNIFICANT CHANGE UP (ref 135–145)
WBC # BLD: 8.18 K/UL — SIGNIFICANT CHANGE UP (ref 3.8–10.5)
WBC # FLD AUTO: 8.18 K/UL — SIGNIFICANT CHANGE UP (ref 3.8–10.5)

## 2023-03-06 RX ORDER — SENNA PLUS 8.6 MG/1
2 TABLET ORAL
Qty: 0 | Refills: 0 | DISCHARGE
Start: 2023-03-06

## 2023-03-06 RX ORDER — ACETAMINOPHEN 500 MG
2 TABLET ORAL
Qty: 0 | Refills: 0 | DISCHARGE
Start: 2023-03-06

## 2023-03-06 RX ORDER — INSULIN ASPART 100 [IU]/ML
10 INJECTION, SOLUTION SUBCUTANEOUS
Qty: 900 | Refills: 0
Start: 2023-03-06 | End: 2023-04-04

## 2023-03-06 RX ORDER — INSULIN ASPART 100 [IU]/ML
5 INJECTION, SOLUTION SUBCUTANEOUS
Qty: 5 | Refills: 0
Start: 2023-03-06 | End: 2023-04-04

## 2023-03-06 RX ORDER — WARFARIN SODIUM 2.5 MG/1
3 TABLET ORAL
Qty: 90 | Refills: 0
Start: 2023-03-06 | End: 2023-04-04

## 2023-03-06 RX ORDER — INSULIN DETEMIR 100/ML (3)
20 INSULIN PEN (ML) SUBCUTANEOUS
Qty: 30 | Refills: 0
Start: 2023-03-06 | End: 2023-04-04

## 2023-03-06 RX ORDER — LIDOCAINE 4 G/100G
1 CREAM TOPICAL
Qty: 0 | Refills: 0 | DISCHARGE
Start: 2023-03-06

## 2023-03-06 RX ORDER — POLYETHYLENE GLYCOL 3350 17 G/17G
17 POWDER, FOR SOLUTION ORAL
Qty: 0 | Refills: 0 | DISCHARGE
Start: 2023-03-06

## 2023-03-06 RX ADMIN — Medication 25 MILLIGRAM(S): at 06:42

## 2023-03-06 RX ADMIN — Medication 40 MILLIGRAM(S): at 06:42

## 2023-03-06 RX ADMIN — BUDESONIDE AND FORMOTEROL FUMARATE DIHYDRATE 2 PUFF(S): 160; 4.5 AEROSOL RESPIRATORY (INHALATION) at 11:11

## 2023-03-06 RX ADMIN — LIDOCAINE 1 PATCH: 4 CREAM TOPICAL at 06:58

## 2023-03-06 RX ADMIN — LIDOCAINE 1 PATCH: 4 CREAM TOPICAL at 10:52

## 2023-03-06 RX ADMIN — ONDANSETRON 4 MILLIGRAM(S): 8 TABLET, FILM COATED ORAL at 11:06

## 2023-03-06 RX ADMIN — Medication 5 MILLIGRAM(S): at 06:42

## 2023-03-06 RX ADMIN — AMIODARONE HYDROCHLORIDE 200 MILLIGRAM(S): 400 TABLET ORAL at 06:42

## 2023-03-06 RX ADMIN — ISOSORBIDE MONONITRATE 30 MILLIGRAM(S): 60 TABLET, EXTENDED RELEASE ORAL at 13:14

## 2023-03-06 RX ADMIN — Medication 1 APPLICATION(S): at 11:12

## 2023-03-06 RX ADMIN — Medication 25 MILLIGRAM(S): at 13:14

## 2023-03-06 RX ADMIN — Medication 325 MILLIGRAM(S): at 13:15

## 2023-03-06 RX ADMIN — Medication 5 UNIT(S): at 12:47

## 2023-03-06 RX ADMIN — Medication 1 SPRAY(S): at 12:47

## 2023-03-06 RX ADMIN — PANTOPRAZOLE SODIUM 40 MILLIGRAM(S): 20 TABLET, DELAYED RELEASE ORAL at 06:42

## 2023-03-06 NOTE — PROGRESS NOTE ADULT - REASON FOR ADMISSION
Epistaxis

## 2023-03-06 NOTE — CHART NOTE - NSCHARTNOTESELECT_GEN_ALL_CORE
Event Note
Follow Up/Nutrition Services
ACP NP/Event Note
Event Note
Follow Up/Nutrition Services
rrt/Event Note

## 2023-03-06 NOTE — PROGRESS NOTE ADULT - PROVIDER SPECIALTY LIST ADULT
Nephrology
Nephrology
Pulmonology
Internal Medicine
Internal Medicine
Nephrology
Neurology
Pulmonology
Cardiology
Heart Failure
Heart Failure
Internal Medicine
Nephrology
Pulmonology
Internal Medicine
Nephrology
Neurology
Internal Medicine
Internal Medicine
Cardiology
Internal Medicine
Cardiology
Internal Medicine
Cardiology
Cardiology
Internal Medicine
Cardiology
Cardiology

## 2023-03-06 NOTE — DISCHARGE NOTE NURSING/CASE MANAGEMENT/SOCIAL WORK - NSDCPEFALRISK_GEN_ALL_CORE
For information on Fall & Injury Prevention, visit: [email or web address] OR  [email or web address] OR  https://www.cdc.gov/steadi/patient.html

## 2023-03-06 NOTE — DISCHARGE NOTE NURSING/CASE MANAGEMENT/SOCIAL WORK - PATIENT PORTAL LINK FT
You can access the FollowMyHealth Patient Portal offered by [facility] by registering at the following website: [email or web address]. By joining [facility]’s FollowMyHealth portal, you will also be able to view your health information using other applications (apps) compatible with our system.

## 2023-03-06 NOTE — PROGRESS NOTE ADULT - SUBJECTIVE AND OBJECTIVE BOX
[facility] NEPHROLOGY- PROGRESS NOTE    72 year old Female with history of COPD, CHF presents with weakness. Nephrology consulted for elevated Scr.      REVIEW OF SYSTEMS:  Gen: no fevers  Cards: no chest pain  Resp: + dyspnea improving  GI: + nausea, no vomiting or diarrhea, + decreased PO intake  Vascular: no LE edema    No Known Allergies      Hospital Medications: Medications reviewed      VITALS:  T(F): 97.8 (03-06-23 @ 10:00), Max: 98.5 (03-06-23 @ 06:00)  HR: 77 (03-06-23 @ 10:00)  BP: 115/50 (03-06-23 @ 10:00)  RR: 18 (03-06-23 @ 10:00)  SpO2: 95% (03-06-23 @ 10:00)  Wt(kg): --    03-05 @ 07:01  -  03-06 @ 07:00  --------------------------------------------------------  IN: 238 mL / OUT: 0 mL / NET: 238 mL        PHYSICAL EXAM:    Gen: NAD, calm  Cards: RRR, +S1/S2, no M/G/R  Resp: CTA B/L  GI: soft, NT, ND, NABS  Vascular: no LE edema      LABS:  03-06    142  |  90<L>  |  114<H>  ----------------------------<  118<H>  3.6   |  34<H>  |  3.52<H>    Ca    11.1<H>      06 Mar 2023 08:00  Phos  2.3     03-06  Mg     2.20     03-06      Creatinine Trend: 3.52 <--, 3.16 <--, 2.98 <--, 3.02 <--, 3.22 <--, 3.65 <--, 3.52 <--, 3.40 <--                        9.4    8.18  )-----------( 336      ( 06 Mar 2023 08:00 )             32.5     Urine Studies:

## 2023-03-06 NOTE — CHART NOTE - NSCHARTNOTEFT_GEN_A_CORE
Pt seen for malnutrition follow up.    Medical Course:  - Per chart, pt is 72 year old female PMH gout, COPD, HTN, type 2 DM, CHF, CKD, Afib, pulm HTN, mitral and tricuspid valve replacement (on warfarin) presenting for epistaxis found to have anemia, MISAEL on CKD. Nephrology and Cardiology following. Palliative was consulted, DNR/DNI per GOC.    Nutrition Course:  - Pt with continued poor PO intake, does not vocalize any food preferences and is fixated on anticipated discharge. Unclear reasoning for diet texture downgrade. Pt is not consuming Glucerna shakes. Previously discussed with ACP to provide alternative supplement and d/c therapeutic restrictions. Unknown last BM. Ordered for Miralax 17 gm qD, senna 2 tablets qHS and bisacodyl 5 mg q12 hrs PRN. Pt continues on steroids, fingersticks WDL.    Diet Prescription:   - Soft and Bite Sized  - 1000mL Fluid Restriction  - Renal Replacement {No Protein Restriction, No Conc K, No Conc Phos, Low Sodium}  - Glucerna Shake 1 PO Daily    Pertinent Medications:   - ferrous sulfate, LEVEMIR 20U qHS, ADMELOG corrective regimen sliding scale, ADMELOG 5U TIDac, pantoprazole Tablet, polyethylene glycol, predniSONE Tablet, senna, simvastatin, aluminum hydroxide/magnesium hydroxide/simethicone Suspension PRN, bisacodyl PRN, ondansetron IV PRN    Pertinent Labs:   - (3/6) Na 142 mmol/L Glu 118 mg/dL<H> K+ 3.6 mmol/L Cr 3.52 mg/dL<H>  mg/dL<H> Phos 2.3 mg/dL<L>  - POCT: (3/6) 116-140     Weight: (3/5) 220.9 lbs / 100.2 kg, (3/4 dosing) 223.5 lbs / 101.4 kg, (2/28) 234.5 lbs / 106.4 kg, (2/20 dosing) 252.8 lbs / 114.7 kg, (2/5 admission) 242.5 lbs / 110 kg  Weight Assessment: Downtrend in weights since admission, apparent clinically significant ~22 lb (9%) weight loss x1 month.   Height: 60.1 in / 152.6 cm  IBW: 100 lbs / 45.5 kg +/-10%  BMI: 43.1 kg/m^2 (at most recent weight)    Physical Assessment, per flowsheets:  Edema: 1+ generalized  Pressure Injury: none noted    Estimated Needs:   [X] Recalculated, with consideration for updated anthropometrics, based on ideal body weight 100 lbs / 45.5 kg  6196-7669 kcal daily @30-35 kcal/kg, 54.6-68.25 gm protein daily @1.2-1.5 gm/kg     Previous Nutrition Diagnosis: [X] Mild malnutrition in the context of acute illness, escalated [X] Severe malnutrition in the context of acute illness, ongoing  New Nutrition Diagnosis: [X] N/A    Interventions:   1) Recommend change diet to Low Sodium; d/c renal restriction and soft/bite sized texture.   2) Recommend d/c Glucerna, add Ensure Clear 1 PO 2x daily (provides 240 kcal, 8 gm protein per 8oz serving).  3) Encourage PO intake as tolerated, document % PO intake in flowsheets.  4) Obtain daily weights.    Monitor & Evaluate:  PO intake, tolerance to diet/supplement, nutrition related lab values, weight trends, BMs/GI distress, hydration status, skin integrity.  Alanis Vila, JUAN, CDN #[id number]  Also available on [employer] Teams. Pt seen for malnutrition follow up.    Medical Course:  - Per chart, pt is 72 year old female PMH gout, COPD, HTN, type 2 DM, CHF, CKD, Afib, pulm HTN, mitral and tricuspid valve replacement (on warfarin) presenting for epistaxis found to have anemia, MISAEL on CKD. Nephrology and Cardiology following. Palliative was consulted, DNR/DNI per GOC.    Nutrition Course:  - Pt with continued poor PO intake, does not vocalize any food preferences and is fixated on anticipated discharge. Unclear reasoning for diet texture downgrade. Pt is not consuming Glucerna shakes. Unknown last BM. Ordered for Miralax 17 gm qD, senna 2 tablets qHS and bisacodyl 5 mg q12 hrs PRN. Pt continues on steroids, fingersticks WDL.    Diet Prescription:   - Soft and Bite Sized  - 1000mL Fluid Restriction  - Renal Replacement {No Protein Restriction, No Conc K, No Conc Phos, Low Sodium}  - Glucerna Shake 1 PO Daily    Pertinent Medications:   - ferrous sulfate, LEVEMIR 20U qHS, ADMELOG corrective regimen sliding scale, ADMELOG 5U TIDac, pantoprazole Tablet, polyethylene glycol, predniSONE Tablet, senna, simvastatin, aluminum hydroxide/magnesium hydroxide/simethicone Suspension PRN, bisacodyl PRN, ondansetron IV PRN    Pertinent Labs:   - (3/6) Na 142 mmol/L Glu 118 mg/dL<H> K+ 3.6 mmol/L Cr 3.52 mg/dL<H>  mg/dL<H> Phos 2.3 mg/dL<L>  - POCT: (3/6) 116-140     Weight: (3/5) 220.9 lbs / 100.2 kg, (3/4 dosing) 223.5 lbs / 101.4 kg, (2/28) 234.5 lbs / 106.4 kg, (2/20 dosing) 252.8 lbs / 114.7 kg, (2/5 admission) 242.5 lbs / 110 kg  Weight Assessment: Downtrend in weights since admission, apparent clinically significant ~22 lb (9%) weight loss x1 month.   Height: 60.1 in / 152.6 cm  IBW: 100 lbs / 45.5 kg +/-10%  BMI: 43.1 kg/m^2 (at most recent weight)    Physical Assessment, per flowsheets:  Edema: 1+ generalized  Pressure Injury: none noted    Estimated Needs:   [X] No changes since previous assessment, based on ideal body weight 100 lbs / 45.5 kg  1421-7593 kcal daily @30-35 kcal/kg, 54.6-68.25 gm protein daily @1.2-1.5 gm/kg     Previous Nutrition Diagnosis: [X] Mild malnutrition in the context of acute illness, escalated [X] Severe malnutrition in the context of acute illness, ongoing  New Nutrition Diagnosis: [X] N/A    Interventions:   1) Recommend change diet to Low Sodium; d/c renal restriction and soft/bite sized texture.   2) Recommend d/c Glucerna, add Ensure Clear 1 PO 2x daily (provides 240 kcal, 8 gm protein per 8oz serving).  3) Encourage PO intake as tolerated, document % PO intake in flowsheets.  4) Obtain daily weights.    Monitor & Evaluate:  PO intake, tolerance to diet/supplement, nutrition related lab values, weight trends, BMs/GI distress, hydration status, skin integrity.  Alanis Vila RDN, CDN #[id number]  Also available on [employer] Teams.

## 2023-03-06 NOTE — PROGRESS NOTE ADULT - ASSESSMENT
71 y/o female with PMHx of Gout, COPD, HTN, DM, CHF, CKD, Afib, Pulm HTN, Mitral and Tricuspid Valve replacement (mechanical valve on warfarin), on 3L home O2 presents to ED for evaluation of epistaxis now admitted due to concern of acute drop in hemoglobin and subtherapeutic INR.
72 year old Female with history of COPD, CHF presents with weakness. Nephrology consulted for elevated Scr.    1) MISAEL: likely CRS. Scr now improving with diuresis. UA bland. FeUrea low. CT with no mention of hydronephrosis. Avoid nephrotoxins.    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 Outpatient CKD work up. Monitor electrolytes.    3) HTN with CKD: BP controlled. Continue with current medications. Monitor BP.    4) LE edema: Continue with bumex gtt @ 1 mg/hour and will give an additional metolazone 5 mg PO X 1 dose today (please document UO and daily weights). Prior TTE with mild to moderate global LVSD. Monitor UO.    5) Anemia: Hb low with mild iron deficiency s/p venofer. s/p Epo 10K X 1 dose on 2/20. Monitor Hb.    6) L renal mass: Urology recommending outpatient follow up. However patient unable to follow up as an outpatient due to difficulty ambulating. Recommend inpatient consult.      [facility] NEPHROLOGY  Rodrigue Amador M.D.  Rob Perez D.O.  Ana Song M.D.  Lucrecia López, MSN, ANP-C    Telephone: [phone]  Facsimile: [phone]    [address] [place] [address]      
72 year old Female with history of COPD, CHF presents with weakness. Nephrology consulted for elevated Scr.    1) MISAEL: likely CRS. Scr relatively stable. UA bland. FeUrea low. Bladder scan negative. Avoid nephrotoxins.    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 Outpatient CKD work up. Monitor electrolytes.    3) HTN with CKD: BP controlled. Continue with current medications. Monitor BP.    4) LE edema: Improving. Continue with bumex 2 mg IV twice daily and IV albumin to help augment UO. Will convert to oral  on 2/20 (home dose). Patient with elevated serum bicarbonate but with mixed disorder (respiratory acidosis and metabolic alkalosis with overall acidemia on blood gas). Prior TTE with mild to moderate global LVSD. Monitor UO.    5) Anemia: Hb low with mild iron deficiency. s/p Epo 10K X 1 & venofer 200 mg IV X 1 on 2/17. Monitor Hb.    6) L renal mass: Outpatient Urology follow up.      [facility] NEPHROLOGY  Rodrigue Amador M.D.  Rob Perez D.O.  Ana Song M.D.  Lucrecia López, ALLISON, ANP-C    Telephone: [phone]  Facsimile: [phone]    [address] [place] [id number]      
72 year old Female with history of COPD, CHF presents with weakness. Nephrology consulted for elevated Scr.    1) MISAEL: likely CRS. Scr relatively stable. UA bland. FeUrea low. Bladder scan negative. Avoid nephrotoxins.    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 Outpatient CKD work up. Monitor electrolytes.    3) HTN with CKD: BP controlled. Continue with current medications. Monitor BP.    4) LE edema: Improving. Continue with bumex 2 mg IV twice daily and IV albumin to help augment UO. Will convert to oral on discharge (home dose). Patient with elevated serum bicarbonate but with mixed disorder (respiratory acidosis and metabolic alkalosis with overall acidemia on blood gas). Prior TTE with mild to moderate global LVSD. Monitor UO.    5) Anemia: Hb low with mild iron deficiency. s/p Epo 10K X 1 & venofer 200 mg IV X 1 on 2/17. Monitor Hb.    6) L renal mass: Outpatient Urology follow up.      [facility] NEPHROLOGY  Rodrigue Amador M.D.  Rob Perez D.O.  Ana Song M.D.  Lucrecia López, ALLISON, ANP-C    Telephone: [phone]  Facsimile: [phone]    [address] [place] [address]      
72 year old Female with history of COPD, CHF presents with weakness. Nephrology consulted for elevated Scr.    1) MISAEL: likely hemodynamically mediated. Scr improved, stable now. UA bland. FeUrea low. CT without hydronephrosis. Avoid nephrotoxins.    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 Outpatient CKD work up. Monitor electrolytes.    3) HTN with CKD: BP controlled. Continue with current medications. Monitor BP.    4) LE edema: With persistent pulmonary edema on CXR despite improving LE edema. Defer increasing torsemide 40 mg PO daily given advanced renal failure and possible home hospice consultation. If no plans for hospice, can consult HF team for RHC to help determine cardiac pressures. Repeat TTE with normal LVSF. Monitor UO.    5) Anemia: Hb low with mild iron deficiency s/p venofer. s/p Epo 10K X 1 dose on 2/28. Monitor Hb.    6) L renal mass: Urology consult if within GOC.      [facility] NEPHROLOGY  Rodrigue Amador M.D.  Rob Perez D.O.  Ana Song M.D.  Lucrecia López, MSN, ANP-C    Telephone: [phone]  Facsimile: [phone]    [address] [place] [address]      
72 year old Female with history of COPD, CHF presents with weakness. Nephrology consulted for elevated Scr.    1) MISAEL: likely hemodynamically mediated. Scr improving. UA bland. FeUrea low. CT without hydronephrosis. Avoid nephrotoxins.    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 Outpatient CKD work up. Monitor electrolytes.    3) HTN with CKD: BP controlled. Continue with current medications. Monitor BP.    4) LE edema: With persistent pulmonary edema on CXR despite improving LE edema. Defer increasing torsemide 40 mg PO daily given advanced renal failure and possible home hospice consultation. If no plans for hospice, can consult HF team for RHC to help determine cardiac pressures. Repeat TTE with normal LVSF. Monitor UO.    5) Anemia: Hb low with mild iron deficiency s/p venofer. s/p Epo 10K X 1 dose on 2/28. Monitor Hb.    6) L renal mass: Urology consult if within GOC.      [facility] NEPHROLOGY  Rodrigue Amador M.D.  Rob Perez D.O.  Ana Song M.D.  Lucrecia López, MSN, ANP-C    Telephone: [phone]  Facsimile: [phone]    [address] [place] [address]      
72 year old Female with history of COPD, CHF presents with weakness. Nephrology consulted for elevated Scr.    1) MISAEL: likely hemodynamically mediated. Scr increased this morning on bumex gtt. Will D/C today. UA bland. FeUrea low. CT with no mention of hydronephrosis. Avoid nephrotoxins.    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 Outpatient CKD work up. Monitor electrolytes.    3) HTN with CKD: BP controlled. Continue with current medications. Monitor BP.    4) LE edema: With improving SOB. D/C bumex gtt. Will start oral diuretics in AM for maintenance. Repeat blood gas in AM to r/o contraction alkalosis s/p diamox on 2/24. Repeat TTE with normal LVSF. Monitor UO.    5) Anemia: Hb low with mild iron deficiency s/p venofer. s/p Epo 10K X 1 dose on 2/20. Monitor Hb.    6) L renal mass: Urology recommending outpatient follow up. However patient unable to follow up as an outpatient due to difficulty ambulating. Recommend inpatient consult.      [facility] NEPHROLOGY  Rodrigue Amador M.D.  Rob Perez D.O.  Ana Song M.D.  Lucrecia López, MSN, ANP-C    Telephone: [phone]  Facsimile: [phone]    [address] [place] [address]      
72 year old Female with history of COPD, CHF presents with weakness. Nephrology consulted for elevated Scr.    1) MISAEL: likely hemodynamically mediated. Scr increasing for which bumex discontinued on 2/25. UA bland. FeUrea low. CT with no mention of hydronephrosis. Avoid nephrotoxins.    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 Outpatient CKD work up. Monitor electrolytes.    3) HTN with CKD: BP controlled. Continue with current medications. Monitor BP.    4) LE edema: With improving SOB. Will likely start oral diuretics in AM for maintenance. Repeat TTE with normal LVSF. Monitor UO.    5) Anemia: Hb low with mild iron deficiency s/p venofer. s/p Epo 10K X 1 dose on 2/20. Monitor Hb.    6) L renal mass: Urology recommending outpatient follow up. However patient unable to follow up as an outpatient due to difficulty ambulating. Recommend inpatient consult if within GOC.      [facility] NEPHROLOGY  Rodrigue Amador M.D.  Rob Perez D.O.  Ana Song M.D.  Lucrecia López, ALLISON, ANP-C    Telephone: [phone]  Facsimile: [phone]    [address] [place] [address]      
73 y/o female with PMHx of Gout, COPD, HTN, DM, CHF, CKD, Afib, Pulm HTN, Mitral and Tricuspid Valve replacement (mechanical valve on warfarin), on 3L home O2 presents to ED for evaluation of epistaxis now admitted due to concern of acute drop in hemoglobin and subtherapeutic INR.
71 y/o female with PMHx of Gout, COPD, HTN, DM, CHF, CKD, Afib, Pulm HTN, Mitral and Tricuspid Valve replacement (mechanical valve on warfarin), on 3L home O2 presents to ED for evaluation of epistaxis now admitted due to concern of acute drop in hemoglobin and subtherapeutic INR.
72 year old Female with history of COPD, CHF presents with weakness. Nephrology consulted for elevated Scr.    1) MISAEL: likely CRS. Scr increasing with evidence of volume overload on CT chest. Continue with bumex gtt @ 1 mg/hour. Will give concurrent metolazone 5 mg PO X 1 dose. UA bland. FeUrea low. CT with no mention of hydronephrosis. Avoid nephrotoxins.    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 Outpatient CKD work up. Monitor electrolytes.    3) HTN with CKD: BP controlled. Continue with current medications. Monitor BP.    4) LE edema: Diuretics as above. Prior TTE with mild to moderate global LVSD. Monitor UO.    5) Anemia: Hb low with mild iron deficiency s/p venofer. s/p Epo 10K X 1 dose on 2/20. Monitor Hb.    6) L renal mass: Can consult Urology.      [facility] NEPHROLOGY  Rodrigue Amador M.D.  Rob Perez D.O.  Ana Song M.D.  Lucrecia López, MSN, ANP-C    Telephone: [phone]  Facsimile: [phone]    [address] [place] [address]      
72 year old Female with history of COPD, CHF presents with weakness. Nephrology consulted for elevated Scr.    1) MISAEL: likely CRS. Scr now increasing with volume overload on examination. Change bumex to bumex gtt @ 1 mg/hour. Will adjust pending CT chest results. Repeat UA with urine urea, urine creatinine. F/U CT A/P to ensure patient not obstructed. Avoid nephrotoxins.    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 Outpatient CKD work up. Monitor electrolytes.    3) HTN with CKD: BP controlled. Continue with current medications. Monitor BP.    4) LE edema: Plan to start bumex gtt as above. F/U CT chest results as difficult to assess volume status given obesity. Prior TTE with mild to moderate global LVSD. Monitor UO.    5) Anemia: Hb low with mild iron deficiency s/p venofer. s/p Epo 10K X 1 dose on 2/20. Monitor Hb.    6) L renal mass: Outpatient Urology follow up.      [facility] NEPHROLOGY  Rodrigue Amador M.D.  Rob Perez D.O.  Ana Song M.D.  Lucrecia López, MSN, ANP-C    Telephone: [phone]  Facsimile: [phone]    [address] [place] [address]      
72 year old Female with history of COPD, CHF presents with weakness. Nephrology consulted for elevated Scr.    1) MISAEL: likely CRS. Scr relatively stable. UA bland. FeUrea low. Bladder scan negative. Avoid nephrotoxins.    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 Outpatient CKD work up. Monitor electrolytes.    3) HTN with CKD: BP controlled. Continue with current medications. Monitor BP.    4) LE edema: Improving. Continue with bumex 2 mg IV twice daily and will add IV albumin to help augment UO. Will convert to oral on discharge (home dose). Patient with elevated serum bicarbonate but with mixed disorder (respiratory acidosis and metabolic alkalosis with overall acidemia on blood gas). Prior TTE with mild to moderate global LVSD. Monitor UO.    5) Anemia: Hb low with mild iron deficiency. Will give Epo 10K X 1 dose today and venofer 200 mg IV X 1 dose today. Monitor Hb.    6) L renal mass: Outpatient Urology follow up.      [facility] NEPHROLOGY  Rodrigue Amador M.D.  Rob Perez D.O.  Ana Song M.D.  Lucrecia López, ALLISON, ANP-C    Telephone: [phone]  Facsimile: [phone]    [address] [place] [id number]      
72 year old Female with history of COPD, CHF presents with weakness. Nephrology consulted for elevated Scr.    1) MISAEL: likely hemodynamically mediated. Scr appears to have stabilized. UA bland. FeUrea low. CT with no mention of hydronephrosis. Avoid nephrotoxins.    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 Outpatient CKD work up. Monitor electrolytes.    3) HTN with CKD: BP controlled. Continue with current medications. Monitor BP.    4) LE edema: With persistent pulmonary edema on CXR despite improving LE edema. Defer increasing torsemide 40 mg PO daily given advanced renal failure and possible home hospice consultation. If no plans for hospice, can consult HF team for RHC to help determine cardiac pressures. Repeat TTE with normal LVSF. Monitor UO.    5) Anemia: Hb low with mild iron deficiency s/p venofer. s/p Epo 10K X 1 dose on 2/28. Monitor Hb.    6) L renal mass: Urology consult if within GOC.      [facility] NEPHROLOGY  Rodrigue Amador M.D.  Rob Perez D.O.  Ana Song M.D.  Lucrecia López, ALLISON, ANP-C    Telephone: [phone]  Facsimile: [phone]    [address] [place] [id number]      
72 year old Female with history of COPD, CHF presents with weakness. Nephrology consulted for elevated Scr.    1) MISAEL: likely hemodynamically mediated. Scr improving. UA bland. FeUrea low. CT with no mention of hydronephrosis. Avoid nephrotoxins.    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 Outpatient CKD work up. Monitor electrolytes.    3) HTN with CKD: BP controlled. Continue with current medications. Monitor BP.    4) LE edema: With persistent pulmonary edema on CXR despite improving LE edema. Defer increasing torsemide 40 mg PO daily given advanced renal failure and possible home hospice consultation. If no plans for hospice, can consult HF team for RHC to help determine cardiac pressures. Repeat TTE with normal LVSF. Monitor UO.    5) Anemia: Hb low with mild iron deficiency s/p venofer. s/p Epo 10K X 1 dose on 2/28. Monitor Hb.    6) L renal mass: Urology consult if within GOC.      [facility] NEPHROLOGY  Rodrigue Amador M.D.  Rob Perez D.O.  Ana Song M.D.  Lucrecia López, MSN, ANP-C    Telephone: [phone]  Facsimile: [phone]    [address] [place] [address]      
73 y/o female with PMHx of Gout, COPD, HTN, DM, CHF, CKD, Afib, Pulm HTN, Mitral and Tricuspid Valve replacement here with epistaxis. Neurology consulted for episode of lethargy, per chart with elevated bicarp, since resolved Neuro exam wnl    Impression: episode of lethargy likely TME in setting of elevated bicarb ?obesity hypoventilation      low threshold to obtain CT head if any further events given medical history  supportive care  Call back with any further questions  
72 year old Female with history of COPD, CHF presents with weakness. Nephrology consulted for elevated Scr.    1) MISAEL: likely CRS. Scr now worsening. Will resume albumin gtt. UA bland. FeUrea low. Bladder scan negative. Avoid nephrotoxins.    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 Outpatient CKD work up. Monitor electrolytes.    3) HTN with CKD: BP controlled. Continue with current medications. Monitor BP.    4) LE edema: Improving. Will change  bumex IV to bumes 2 mg PO bid. Will resume IV albumin to help augment UO. Patient with elevated serum bicarbonate but with mixed disorder (respiratory acidosis and metabolic alkalosis with overall acidemia on blood gas).  Repeat VBG ph. Prior TTE with mild to moderate global LVSD. Monitor UO.    5) Anemia: Hb low with mild iron deficiency. s/p Epo 10K X 1 & venofer 200 mg IV X 1 on 2/17. Will give another Epogen 10k SC today. Transfuse prnb prn. Monitor Hb.    6) L renal mass: Outpatient Urology follow up.      [facility] NEPHROLOGY  Rodrigue Amador M.D.  Rob Perez D.O.  Ana Song M.D.  Lucrecia López, MSN, ANP-C    Telephone: [phone]  Facsimile: [phone]    [address] [place] [address]      
72 year old Female with history of COPD, CHF presents with weakness. Nephrology consulted for elevated Scr.    1) MISAEL: likely hemodynamically mediated with Scr increased this morning likely due to pre-renal disease given nausea and decreased PO intake. Hold torsemide. UA bland. FeUrea low. CT without hydronephrosis. Avoid nephrotoxins.    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 Outpatient CKD work up. Monitor electrolytes.    3) HTN with CKD: BP controlled. Continue with current medications. Monitor BP.    4) LE edema: With persistent pulmonary edema on CXR despite improving LE edema. If patient planned for home hospice, would resume torsemide 40 mg PO daily on discharge for symptom control. Repeat TTE with normal LVSF. Monitor UO.    5) Anemia: Hb low with mild iron deficiency s/p venofer. s/p Epo 10K X 1 dose on 2/28. Monitor Hb.    6) L renal mass: Urology consult if within GOC.      [facility] NEPHROLOGY  Rodrigue Amador M.D.  Rob Perez D.O.  Ana Song M.D.  Lucrecia López, MSN, ANP-C    Telephone: [phone]  Facsimile: [phone]    [address] [place] [address]      
72 year old Female with history of COPD, CHF presents with weakness. Nephrology consulted for elevated Scr.    1) MISAEL: likely hemodynamically mediated. Scr appears to have stabilized off of diuretics. UA bland. FeUrea low. CT with no mention of hydronephrosis. Avoid nephrotoxins.    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 Outpatient CKD work up. Monitor electrolytes.    3) HTN with CKD: BP controlled. Continue with current medications. Monitor BP.    4) LE edema: With improving SOB. Can start torsemide 40 mg PO daily if ok with HF team. Repeat TTE with normal LVSF. Monitor UO.    5) Anemia: Hb low with mild iron deficiency s/p venofer. s/p Epo 10K X 1 dose on 2/20. Will give another dose today. Monitor Hb.    6) L renal mass: Urology recommending outpatient follow up. However patient unable to follow up as an outpatient due to difficulty ambulating. Recommend inpatient consult if within GOC.      [facility] NEPHROLOGY  Rodrigue Amador M.D.  Rob Perez D.O.  Ana Song M.D.  Lucrecia López, ALLISON, ANP-C    Telephone: [phone]  Facsimile: [phone]    [address] [place] [address]      
72 year old Female with history of COPD, CHF presents with weakness. Nephrology consulted for elevated Scr.    1) MISAEL: likely hemodynamically mediated. Scr appears to have stabilized off of diuretics. UA bland. FeUrea low. CT with no mention of hydronephrosis. Avoid nephrotoxins.    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 Outpatient CKD work up. Monitor electrolytes.    3) HTN with CKD: BP low normal. Decrease Hydralazine to 25 mg PO TID. Monitor BP.    4) LE edema: With improving SOB. Start torsemide 40 mg PO daily for maintenance. Repeat TTE with normal LVSF. Monitor UO.    5) Anemia: Hb low with mild iron deficiency s/p venofer. s/p Epo 10K X 1 dose on 2/20. Will give another dose today. Monitor Hb.    6) L renal mass: Urology recommending outpatient follow up. However patient unable to follow up as an outpatient due to difficulty ambulating. Recommend inpatient consult if within GOC.      [facility] NEPHROLOGY  Rodrigue Amador M.D.  Rob Perez D.O.  Ana Song M.D.  Lucrecia López, ALLISON, ANP-C    Telephone: [phone]  Facsimile: [phone]    [address] [place] [address]      
72 year old Female with history of COPD, CHF presents with weakness. Nephrology consulted for elevated Scr.    1) MISAEL: likely hemodynamically mediated. Scr improving, stable now. UA bland. FeUrea low. CT without hydronephrosis. Avoid nephrotoxins.    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 Outpatient CKD work up. Monitor electrolytes.    3) HTN with CKD: BP controlled. Continue with current medications. Monitor BP.    4) LE edema: With persistent pulmonary edema on CXR despite improving LE edema. Defer increasing torsemide 40 mg PO daily given advanced renal failure and possible home hospice consultation. If no plans for hospice, can consult HF team for RHC to help determine cardiac pressures. Repeat TTE with normal LVSF. Monitor UO.    5) Anemia: Hb low with mild iron deficiency s/p venofer. s/p Epo 10K X 1 dose on 2/28. Monitor Hb.    6) L renal mass: Urology consult if within GOC.      [facility] NEPHROLOGY  Rodrigue Amador M.D.  Rob Perez D.O.  Ana Song M.D.  Lucrecia López, MSN, ANP-C    Telephone: [phone]  Facsimile: [phone]    [address] [place] [address]      
72 year old Female with history of COPD, CHF presents with weakness. Nephrology consulted for elevated Scr.    1) MISAEL: likely hemodynamically mediated. Scr increased this morning. Can discuss with HF team possible RHC? to help assess volume status. UA bland. FeUrea low. CT with no mention of hydronephrosis. Avoid nephrotoxins.    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 Outpatient CKD work up. Monitor electrolytes.    3) HTN with CKD: BP controlled. Continue with current medications. Monitor BP.    4) LE edema: With improving SOB. On bumex gtt @ 1 mg/hour. Will defer management to HF team. Will give diamox 500 mg IV X 1 dose given rising serum CO2 and overall alkalemia on blood gas. Prior TTE with mild to moderate global LVSD. Monitor UO.    5) Anemia: Hb low with mild iron deficiency s/p venofer. s/p Epo 10K X 1 dose on 2/20. Monitor Hb.    6) L renal mass: Urology recommending outpatient follow up. However patient unable to follow up as an outpatient due to difficulty ambulating. Recommend inpatient consult.      [facility] NEPHROLOGY  Rodrigue Amador M.D.  Rob Perez D.O.  Ana Song M.D.  Lucrecia López, MSN, ANP-C    Telephone: [phone]  Facsimile: [phone]    [address] [place] [address]      
73 y/o female with PMHx of Gout, COPD, HTN, DM, CHF, CKD, Afib, Pulm HTN, Mitral and Tricuspid Valve replacement here with epistaxis. Neurology consulted for episode of lethargy, per chart with elevated bicarp, since resolved Neuro exam wnl    Impression: episode of lethargy likely TME in setting of elevated bicarb ?obesity hypoventilation    CTh chronic right cerebellar infarct  anticoagulated (today INR supratherapeutic)  supportive care  Call back with any further questions  Can follow up with Neurology, Dr. Prosper Alvarez at [phone]  
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 Patient was recently here for COPD exacerbation and discharged home with therapeutic INR. She was in a relatively baseline state of health and uses 3L NC at home until this PM she started having diffuse epistaxis for about 30 minutes. She states it was jacey blood and she used up almost 1 roll of paper towels. She states she has been taking her medication consistently and able to state that she has taken her warfarin 5mg this AM. Denies chest pain, dyspnea, diarrhea, fever, chills, cough, URI symptoms, headaches. She does have chronic dizziness. Denies melena or hematochezia.In the ED, NC3L, 168/69. Epistaxis stopped. INR subtherapeutic to 1~. Hgb to ~8 from 10 from last admission.     Pertinent labs:  BNP  2896   Lactate  0.9   Troponin  88/84/66     EKG: Atrial fibrillation with a competing junctional pacemaker Right bundle branch block    CXR: Bilateral perihilar opacities with left effusion consistent with edema.   Heart is enlarged with sternotomy wires and mitral valve prosthesis.    RHC:  Cardiac Cath Lab - Adult (10.06.20 @ 09:19)     HEMODYNAMIC TABLES  Pressures:  -- Left Ventricle (s/edp): 120/16/--  Pressures:  -- Mitral Valve Splice - LV-PCW - LV: 123/7/--  Pressures:  -- Mitral Valve Splice - LV-PCW - PCW: 0/0/--  Pressures:  -- Pulmonary Artery (S/D/M): 50/17/29  Pressures:  -- Pulmonary Capillary Wedge: 21/20/16  Pressures:  -- Right Atrium (a/v/M): 15/20/15  Pressures:  -- Right Ventricle (s/edp): 51/12/--  O2 Sats:  Baseline  O2 Sats:  - HR: 72  O2 Sats:  - Rhythm:  O2 Sats:  -- AO: 8.6/97.3/11.38  O2 Sats:  -- PA: 8.3/68/7.68  O2 Sats:  -- PA: 8.6/70.6/8.26  Valves:  Baseline  Valves:  -- MITRAL: Left diastolic filling period: 31.00  Valves:  -- MITRAL: Mitral valve area: 2.39  Valves:  -- MITRAL: Mitral valve flow: 243.83  Valves:  -- MITRAL: Mitral valve index: 1.14  Valves:  -- MITRAL: Mitral Valve Splice - LV-PCW - Gradient: 7.30  Outputs:  -- OUTPUTS: CO by Princess: 7.56  Outputs:  -- OUTPUTS: Princess cardiac index: 3.61  Outputs:  -- OUTPUTS: O2 consumption: 280.00  OOutputs:  -- RESISTANCES: Pulmonary vascular resistance (Wood Units): 1.72  OOutputs:  -- RESISTANCES: Systemic vascular resistance (dsc): 783.01        CT Abdomen and Pelvis No Cont (02.22.23 @ 09:26) >  Sagittal and coronal reformats were performed.    FINDINGS:  CHEST:  Exam limited by respiratory motion.  LUNGS AND LARGE AIRWAYS:Diffuse bilateral groundglass opacities, left   than right, with interlobular septal thickening. Similar-appearing left   upper lobe atelectasis versus scarring. Trace left basilar atelectasis.   0.5 cm right apex nodule (2, 12), unchanged since January 8, 2022.  PLEURA: No pleural effusion.  VESSELS: Coronary artery and aortic calcifications.  HEART: Cardiomegaly. No pericardial effusion. Mitral and tricuspid   valvular replacements.  MEDIASTINUM AND CARMELA: Calcified mediastinal lymph nodes.  CHEST WALL AND LOWER NECK: Status post prior median sternotomy.   Nonspecific surgical clips in the right supraclavicular region.    ABDOMEN AND PELVIS:  Full examination of the intra-abdominal viscera and vasculature is   limited without the addition ofIV contrast.    LIVER: Cirrhosis.  BILE DUCTS: Normal caliber.  GALLBLADDER: Cholelithiasis.  SPLEEN: Within normal limits.  PANCREAS: Within normal limits.  ADRENALS: Within normal limits.  KIDNEYS/URETERS: Similar-appearing 3.2 cm left upper pole renal mass when   compared to CT chest from 1/19/2023 not well evaluated on this   noncontrast scan.    BLADDER: Within normal limits.  REPRODUCTIVE ORGANS: Uterus and adnexa within normal limits.    BOWEL: Colonic diverticulosis without diverticulitis. No bowel   obstruction. Appendix is normal.  PERITONEUM: No ascites. No free air.  VESSELS: Atherosclerotic changes.  RETROPERITONEUM/LYMPH NODES: No lymphadenopathy.  ABDOMINAL WALL: Small fat-containing umbilical hernia.  BONES: Degenerative changes.    IMPRESSION:  Exam limited by respiratory motion and lack of IV contrast.    Pulmonary edema left greater than right.    Similar-appearing 3.2 cm left upper pole renal mass not well evaluated on   this noncontrast scan, but shown to contain internal vascularity on renal   ultrasound from April 6, 2021, suspicious for renal neoplasm. Further   evaluation with contrast-enhanced CT or MRI abdomen (renal mass protocol)   is recommended.    
71 y/o female with PMHx of Gout, COPD, HTN, DM, CHF, CKD, Afib, Pulm HTN, Mitral and Tricuspid Valve replacement (mechanical valve on warfarin), on 3L home O2 presents to ED for evaluation of epistaxis now admitted due to concern of acute drop in hemoglobin and subtherapeutic INR.
73 y/o female with PMHx of Gout, COPD, HTN, DM, CHF, CKD, Afib, Pulm HTN, Mitral and Tricuspid Valve replacement (mechanical valve on warfarin), on 3L home O2 presents to ED for evaluation of epistaxis now admitted due to concern of acute drop in hemoglobin and subtherapeutic INR.
71 y/o Female with PMHx of Gout, COPD, HTN, DM, CHF, CKD, Afib, Pulm HTN, 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 Patient was recently here for COPD exacerbation and discharged home with therapeutic INR. She was in a relatively baseline state of health and uses 3L NC at home until this PM she started having diffuse epistaxis for about 30 minutes. She states it was jacey blood and she used up almost 1 roll of paper towels. She states she has been taking her medication consistently and able to state that she has taken her warfarin 5mg this AM. Denies chest pain, dyspnea, diarrhea, fever, chills, cough, URI symptoms, headaches. She does have chronic dizziness. Denies melena or hematochezia.In the ED, NC3L, 168/69. Epistaxis stopped. INR subtherapeutic to 1~. Hgb to ~8 from 10 from last admission. She was seen by our HF team, and found to be hypervolermic and placed on Bumex gtt, which was held on 2/25.         
73 y/o female with PMHx of Gout, COPD, HTN, DM, CHF, CKD, Afib, Pulm HTN, Mitral and Tricuspid Valve replacement (mechanical valve on warfarin), on 3L home O2 presents to ED for evaluation of epistaxis now admitted due to concern of acute drop in hemoglobin and subtherapeutic INR.
71 y/o female with PMHx of Gout, COPD, HTN, DM, CHF, CKD, Afib, Pulm HTN, Mitral and Tricuspid Valve replacement (mechanical valve on warfarin), on 3L home O2 presents to ED for evaluation of epistaxis now admitted due to concern of acute drop in hemoglobin and subtherapeutic INR.
73 y/o female with PMHx of Gout, COPD, HTN, DM, CHF, CKD, Afib, Pulm HTN, Mitral and Tricuspid Valve replacement (mechanical valve on warfarin), on 3L home O2 presents to ED for evaluation of epistaxis now admitted due to concern of acute drop in hemoglobin and subtherapeutic INR.
71 y/o female with PMHx of Gout, COPD, HTN, DM, CHF, CKD, Afib, Pulm HTN, Mitral and Tricuspid Valve replacement (mechanical valve on warfarin), on 3L home O2 presents to ED for evaluation of epistaxis now admitted due to concern of acute drop in hemoglobin and subtherapeutic INR.
71 y/o female with PMHx of Gout, COPD, HTN, DM, CHF, CKD, Afib, Pulm HTN, Mitral and Tricuspid Valve replacement (mechanical valve on warfarin), on 3L home O2 presents to ED for evaluation of epistaxis now admitted due to concern of acute drop in hemoglobin and subtherapeutic INR.
73 y/o female with PMHx of Gout, COPD, HTN, DM, CHF, CKD, Afib, Pulm HTN, Mitral and Tricuspid Valve replacement (mechanical valve on warfarin), on 3L home O2 presents to ED for evaluation of epistaxis now admitted due to concern of acute drop in hemoglobin and subtherapeutic INR.
71 y/o female with PMHx of Gout, COPD, HTN, DM, CHF, CKD, Afib, Pulm HTN, Mitral and Tricuspid Valve replacement (mechanical valve on warfarin), on 3L home O2 presents to ED for evaluation of epistaxis now admitted due to concern of acute drop in hemoglobin and subtherapeutic INR.
73 y/o female with PMHx of Gout, COPD, HTN, DM, CHF, CKD, Afib, Pulm HTN, Mitral and Tricuspid Valve replacement (mechanical valve on warfarin), on 3L home O2 presents to ED for evaluation of epistaxis now admitted due to concern of acute drop in hemoglobin and subtherapeutic INR.
73 y/o female with PMHx of Gout, COPD, HTN, DM, CHF, CKD, Afib, Pulm HTN, Mitral and Tricuspid Valve replacement (mechanical valve on warfarin), on 3L home O2 presents to ED for evaluation of epistaxis now admitted due to concern of acute drop in hemoglobin and subtherapeutic INR.
71 y/o female with PMHx of Gout, COPD, HTN, DM, CHF, CKD, Afib, Pulm HTN, Mitral and Tricuspid Valve replacement (mechanical valve on warfarin), on 3L home O2 presents to ED for evaluation of epistaxis now admitted due to concern of acute drop in hemoglobin and subtherapeutic INR.
71 y/o female with PMHx of Gout, COPD, HTN, DM, CHF, CKD, Afib, Pulm HTN, Mitral and Tricuspid Valve replacement (mechanical valve on warfarin), on 3L home O2 presents to ED for evaluation of epistaxis now admitted due to concern of acute drop in hemoglobin and subtherapeutic INR.
73 y/o female with PMHx of Gout, COPD, HTN, DM, CHF, CKD, Afib, Pulm HTN, Mitral and Tricuspid Valve replacement (mechanical valve on warfarin), on 3L home O2 presents to ED for evaluation of epistaxis now admitted due to concern of acute drop in hemoglobin and subtherapeutic INR.
73 y/o female with PMHx of Gout, COPD, HTN, DM, CHF, CKD, Afib, Pulm HTN, Mitral and Tricuspid Valve replacement (mechanical valve on warfarin), on 3L home O2 presents to ED for evaluation of epistaxis now admitted due to concern of acute drop in hemoglobin and subtherapeutic INR.
71 y/o female with PMHx of Gout, COPD, HTN, DM, CHF, CKD, Afib, Pulm HTN, Mitral and Tricuspid Valve replacement (mechanical valve on warfarin), on 3L home O2 presents to ED for evaluation of epistaxis now admitted due to concern of acute drop in hemoglobin and subtherapeutic INR.
71 y/o female with PMHx of Gout, COPD, HTN, DM, CHF, CKD, Afib, Pulm HTN, Mitral and Tricuspid Valve replacement (mechanical valve on warfarin), on 3L home O2 presents to ED for evaluation of epistaxis now admitted due to concern of acute drop in hemoglobin and subtherapeutic INR.
73 y/o female with PMHx of Gout, COPD, HTN, DM, CHF, CKD, Afib, Pulm HTN, Mitral and Tricuspid Valve replacement (mechanical valve on warfarin), on 3L home O2 presents to ED for evaluation of epistaxis now admitted due to concern of acute drop in hemoglobin and subtherapeutic INR.
71 y/o female with PMHx of Gout, COPD, HTN, DM, CHF, CKD, Afib, Pulm HTN, Mitral and Tricuspid Valve replacement (mechanical valve on warfarin), on 3L home O2 presents to ED for evaluation of epistaxis now admitted due to concern of acute drop in hemoglobin and subtherapeutic INR.
71 y/o female with PMHx of Gout, COPD, HTN, DM, CHF, CKD, Afib, Pulm HTN, Mitral and Tricuspid Valve replacement (mechanical valve on warfarin), on 3L home O2 presents to ED for evaluation of epistaxis now admitted due to concern of acute drop in hemoglobin and subtherapeutic INR.
73 y/o female with PMHx of Gout, COPD, HTN, DM, CHF, CKD, Afib, Pulm HTN, Mitral and Tricuspid Valve replacement (mechanical valve on warfarin), on 3L home O2 presents to ED for evaluation of epistaxis now admitted due to concern of acute drop in hemoglobin and subtherapeutic INR.

## 2023-05-08 NOTE — ED ADULT TRIAGE NOTE - NS ED TRIAGE AVPU SCALE
Alert-The patient is alert, awake and responds to voice. The patient is oriented to time, place, and person. The triage nurse is able to obtain subjective information. Birth Control Pills Pregnancy And Lactation Text: This medication should be avoided if pregnant and for the first 30 days post-partum.

## 2023-05-08 NOTE — DISCHARGE NOTE NURSING/CASE MANAGEMENT/SOCIAL WORK - PATIENT PORTAL LINK FT
Blurred vision since 1600 yesterday, pt has chronic migraines, blurred vision in L eye, whole eye has blurry vision, no new HA or different feeling in head, pt denies trauma to eye or fall, pt has had this before pt states it was like a spot but no it's blurry, pt states she saw what was like a drop of blood in eye and could see it go down, pt denies strength differnce ext bi lat, no facial drop or language change or sensation differences per pt   You can access the FollowMyHealth Patient Portal offered by [facility] by registering at the following website: [email or web address]. By joining [facility]’s FollowMyHealth portal, you will also be able to view your health information using other applications (apps) compatible with our system.

## 2023-06-16 NOTE — H&P ADULT - ASSESSMENT
Patient presented with abnormal ABG this AM  RN discussed with RT Luis Manuel Carmona who assessed patient  Patient to receive AM breathing treatment, but otherwise defer on BIPAP due to patient having no acute signs of respiratory distress or oversedation  Will continue to monitor throughout shift      Reba Lopez 6/16/2023 7:27 AM 70 year old female PMH HTN CHF COPD valvular heart disease MVR for mitral stenosis, TVR '12 now has severe TR and PHTN transferred for heart failure optimization   and possible mitral intervention

## 2023-06-21 NOTE — ED ADULT TRIAGE NOTE - BP NONINVASIVE SYSTOLIC (MM HG)
Patient: VERONICA JOEL    MR# [id number]    Time of Service: 6/21/2023  1:59 AM     Chief Complaint   Patient presents with   • Abdominal Pain       History of Present Illness    Veronica Joel is a 32 year old male who presents to the ED with abdominal pain, nausea, vomiting since 2300 yesterday.  Patient recently completed antibiotics for an infected stye over his right eye.  The redness has gone down significantly III.  The swelling has gone down slightly but remains present.  He denies any visual changes to the right eye.  Patient had a near syncopal episode while attempting to use the bathroom.  He did not lose consciousness and did not lose his head.  The abdominal pain is generalized, cramping and intermittent.  He was associated with nausea and vomiting.      History provided by (including independent historians): Patient    Review of Systems- (If patient endorsed symptoms, print will be bolded)    All systems reviewed and are negative unless documented in the HPI.    Nursing Notes reviewed      Past Medical/Surgical History    History reviewed. No pertinent past medical history.    No past surgical history on file.    Medication list on file    Current Outpatient Medications   Medication Instructions   • ondansetron (ZOFRAN ODT) 4 mg, Translingual, EVERY 6 HOURS       Allergies    ALLERGIES:  No Known Allergies    Family/Social History/Social Determinants of Health    No family history on file.         Social Determinants of Health     Intimate Partner Violence: Not At Risk (6/21/2023)    Intimate Partner Violence    • Social Determinants: Intimate Partner Violence Past Fear: No    • Social Determinants: Intimate Partner Violence Current Fear: No   Social Connections: Not on file   Alcohol Use: Not on file   Tobacco Use: Not on file   Financial Resource Strain: Not on file   Stress: Not on file   Physical Activity: Not on file   Food Insecurity: Not on file   Transportation Needs: Not on file   Inadequate  Housing: Not on file (6/21/2023)   Depression: Not on file        Physical Exam    Vitals:    06/21/23 0212 06/21/23 0218 06/21/23 0313 06/21/23 0503   BP:  121/77 114/75 98/67   Pulse:  71 71 82   Resp:  17 16 17   Temp: 99.7 °F (37.6 °C)      TempSrc: Oral      SpO2:  98% 98% 100%   Weight: 87.2 kg (192 lb 3.9 oz)          Pulse Oximetry: 100% on room air which is normal (independently reviewed and interpreted by me)    Available triage notes, nursing notes and vital signs reviewed by me.    Constitutional:  Appears well-developed and well-nourished.    Head: Normocephalic and atraumatic.    Nose: Normal.    Mouth/Throat: Oropharynx is clear and moist.    Eyes: Conjunctivae are normal. Pupils are equal, round, and reactive to light. No scleral icterus.    Ears: External ears without gross abnormalities.    Neck: Normal range of motion. No tracheal deviation present.    Cardiovascular: Normal rate, regular rhythm.    Respiratory/Chest: Normal respiratory effort, bilateral breath sounds.    GI: Soft. Not distended. No focal tenderness.     Musculoskeletal: No obvious deformity.    Neurological: Awake and alert, strength and sensation grossly intact, speech is fluent    Skin: Skin is warm and dry, no obvious rash.     Psych: Appropriate mood and affect for condition      Diagnostics:    I have independently reviewed and interpreted the rhythm strip as: Regular rate and rhythm     I have independently reviewed and interpreted the EKG as:      Time: 0219  Rate: 66 bpm  Rhythm: Regular  Axis: Normal  T waves: TWI V2  ST: No RAF or STD  Overall: Normal sinus rhythm        Laboratory summary     Results for orders placed or performed during the hospital encounter of 06/21/23   Comprehensive Metabolic Panel   Result Value Ref Range    Fasting Status      Sodium 141 135 - 145 mmol/L    Potassium 4.6 3.4 - 5.1 mmol/L    Chloride 111 (H) 97 - 110 mmol/L    Carbon Dioxide 24 21 - 32 mmol/L    Anion Gap 11 7 - 19 mmol/L     Glucose 95 70 - 99 mg/dL    BUN 15 6 - 20 mg/dL    Creatinine 1.04 0.67 - 1.17 mg/dL    Glomerular Filtration Rate >90 >=60    BUN/Cr 14 7 - 25    Calcium 8.3 (L) 8.4 - 10.2 mg/dL    Bilirubin, Total 0.3 0.2 - 1.0 mg/dL    GOT/AST 44 (H) <=37 Units/L    GPT/ALT 24 <64 Units/L    Alkaline Phosphatase 73 45 - 117 Units/L    Albumin 3.1 (L) 3.6 - 5.1 g/dL    Protein, Total 6.0 (L) 6.4 - 8.2 g/dL    Globulin 2.9 2.0 - 4.0 g/dL    A/G Ratio 1.1 1.0 - 2.4   Lipase   Result Value Ref Range    Lipase <50 (L) 73 - 393 Units/L   Urinalysis & Reflex Microscopy With Culture If Indicated   Result Value Ref Range    COLOR, URINALYSIS Straw     APPEARANCE, URINALYSIS Clear     GLUCOSE, URINALYSIS Negative Negative mg/dL    BILIRUBIN, URINALYSIS Negative Negative    KETONES, URINALYSIS Trace (A) Negative mg/dL    SPECIFIC GRAVITY, URINALYSIS 1.029 1.005 - 1.030    OCCULT BLOOD, URINALYSIS Negative Negative    PH, URINALYSIS 6.0 5.0 - 7.0    PROTEIN, URINALYSIS Negative Negative mg/dL    UROBILINOGEN, URINALYSIS 0.2 0.2, 1.0 mg/dL    NITRITE, URINALYSIS Negative Negative    LEUKOCYTE ESTERASE, URINALYSIS Small (A) Negative    SQUAMOUS EPITHELIAL, URINALYSIS 1 to 5 None Seen, 1 to 5 /hpf    ERYTHROCYTES, URINALYSIS 1 to 2 None Seen, 1 to 2 /hpf    LEUKOCYTES, URINALYSIS 6 to 10 (A) None Seen, 1 to 5 /hpf    BACTERIA, URINALYSIS None Seen None Seen /hpf    HYALINE CASTS, URINALYSIS None Seen None Seen, 1 to 5 /lpf    MUCUS Present    CBC with Automated Differential (performable only)   Result Value Ref Range    WBC 5.5 4.2 - 11.0 K/mcL    RBC 5.50 4.50 - 5.90 mil/mcL    HGB 14.6 13.0 - 17.0 g/dL    HCT 44.1 39.0 - 51.0 %    MCV 80.2 78.0 - 100.0 fl    MCH 26.5 26.0 - 34.0 pg    MCHC 33.1 32.0 - 36.5 g/dL    RDW-CV 13.2 11.0 - 15.0 %    RDW-SD 38.2 (L) 39.0 - 50.0 fL     (L) 140 - 450 K/mcL    NRBC 0 <=0 /100 WBC    Neutrophil, Percent 79 %    Lymphocytes, Percent 14 %    Mono, Percent 7 %    Eosinophils, Percent 0 %     Basophils, Percent 0 %    Immature Granulocytes 0 %    Absolute Neutrophils 4.2 1.8 - 7.7 K/mcL    Absolute Lymphocytes 0.8 (L) 1.0 - 4.8 K/mcL    Absolute Monocytes 0.4 0.3 - 0.9 K/mcL    Absolute Eosinophils  0.0 0.0 - 0.5 K/mcL    Absolute Basophils 0.0 0.0 - 0.3 K/mcL    Absolute Immature Granulocytes 0.0 0.0 - 0.2 K/mcL   Gold Top   Result Value Ref Range    Extra Tube Hold for Add Ons    Urine, Bacterial Culture    Specimen: Urine clean catch   Result Value Ref Range    Urine, Bacterial Culture No Growth.        Laboratory results above independently reviewed and interpreted by me.       Radiography/Imaging    CT ABDOMEN PELVIS W CONTRAST   Final Result      Thickened appearance of the left colon and sigmoid colon wall which could   be due to nondistention or colitis.      Electronically Signed by: ADAN MESSINA M.D.    Signed on: 6/21/2023 5:28 AM    Workstation ID: [id number]          Imaging result above independently reviewed and interpreted by me.       Procedures    Procedures    Critical Care:    Time spent providing Critical Care Services to the patient 0 minutes.     This excludes time spent by the resident/advanced provider and time spent performing separately billable procedures. Most of the time was spent at the bedside of the patient, reevaluating the patient and vital signs, test review, records review, explaining conditions and results to the patient and/or family, as well as speaking to the PMD and/or Consultant(s). Injury/illness exists that impairs one or more organ systems such that there is high probability of imminent or life-threatening deterioration in the patient's condition w/o intervention.    Medical Decision Making including ED Course and Interventions    Assessment:    Veronica Fernandez is a 32 year old male who presents with periumbilical abdominal pain       Consideration of multiple diagnoses including but not limited to: Early appendicitis, colitis, gastroenteritis, small  bowel obstruction, large bowel obstruction, IBS, DKA, gastritis, enteritis, colitis, UTI         Comorbidities/chronic illnesses impacting care: Recent treatment with antibiotics       Explanation of tests and/or treatment considered, ordered or performed: CBC unremarkable and demonstrates no evidence of leukocytosis that could suggest possible infection. No evidence of severe anemia, thrombocytosis or thrombocytopenia.  CMP unremarkable and demonstrates no evidence of kidney dysfunction, liver dysfunction or electrolyte abnormalities.  Lipase normal making pancreatitis unlikely.  CT examination demonstrated a likely colitis.  I suspect the patient has an antibiotic induced colitis. He had some relief of symptoms while in the ER.  Patient's treatment in the ER included morphine, normal saline, Pepcid and Zofran.  Patient to be prescribed Zofran as an outpatient.        Orders Placed in the ED  Orders Placed This Encounter   • CT ABDOMEN PELVIS W CONTRAST   • CBC with Automated Differential   • Comprehensive Metabolic Panel   • Lipase   • Urinalysis & Reflex Microscopy With Culture If Indicated   • CBC with Automated Differential (performable only)   • [place] Draw Light Blue Top Collected? No Labels; Red Top Collected? No Labels; Light Green Top Collected? No Labels; Lavender Top Collected? No Labels; Gold Top Collected? 1 Label; Pink Top Collected? No Labels; Grey Top Collected? No Labels   • Gold Top   • Electrocardiogram 12-Lead   • Urine, Bacterial Culture   • sodium chloride (NORMAL SALINE) 0.9 % bolus 1,000 mL   • ondansetron (ZOFRAN) injection 4 mg   • famotidine (PEPCID) injection 20 mg   • morphine injection 4 mg   • iohexol (OMNIPAQUE 350 INJECT) contrast solution 100 mL   • morphine injection 4 mg   • DISCONTD: amoxicillin-clavulanate (AUGMENTIN) 875-125 MG tablet 1 tablet   • ondansetron (ZOFRAN ODT) 4 MG disintegrating tablet       Reassessment       On reevaluation, patient had moderate relief of  symptoms.    External records reviewed and documented as above.    Care affected by social determinants of health as documented above.    I personally considered admission/escalation of hospital level care vs discharge vs other disposition from the ED.    Disposition  Discharge      Prescriptions given or considered    Discharge Medication List as of 6/21/2023  6:37 AM      START taking these medications    Details   ondansetron (ZOFRAN ODT) 4 MG disintegrating tablet Place 1 tablet onto the tongue every 6 hours.Normal, Disp-10 tablet, R-0             Patient instructed and strongly encouraged to return immediately to the Emergency Department for any new, persistent, recurrent or worsening symptoms.  The patient was instructed to follow up with a primary care doctor or specialist as soon as possible. Written aftercare and follow-up instructions were discussed with and acknowledged by the patient. Return precautions documented in disposition paperwork.     FINAL CLINICAL IMPRESSION    1. Nausea and vomiting, unspecified vomiting type    2. Generalized abdominal pain        6/25/2023  MD Mena Carroll Benjamin J, MD  06/25/23 1754     152

## 2023-07-02 NOTE — ED PROVIDER NOTE - NSCAREINITIATED _GEN_ER
Pt reports itchiness all over body despite getting the medication earlier. ERP notified   Jayla Avelar(Attending)

## 2023-07-15 NOTE — PROGRESS NOTE ADULT - SUBJECTIVE AND OBJECTIVE BOX
Chief Complaint: DM 1 with hyperglycemia exacerbated by steroids     History: Patient seen at bedside. Reports she is eating meals, denies n/v, denies s/s of hypoglycemia  On steroid taper    MEDICATIONS  (STANDING):  atorvastatin 10 milliGRAM(s) Oral at bedtime  budesonide 160 MICROgram(s)/formoterol 4.5 MICROgram(s) Inhaler 2 Puff(s) Inhalation two times a day  buMETAnide Injectable 2 milliGRAM(s) IV Push two times a day  dextrose 5%. 1000 milliLiter(s) (50 mL/Hr) IV Continuous <Continuous>  dextrose 5%. 1000 milliLiter(s) (100 mL/Hr) IV Continuous <Continuous>  dextrose 50% Injectable 25 Gram(s) IV Push once  dextrose 50% Injectable 12.5 Gram(s) IV Push once  dextrose 50% Injectable 25 Gram(s) IV Push once  ferrous    sulfate 325 milliGRAM(s) Oral daily  glucagon  Injectable 1 milliGRAM(s) IntraMuscular once  heparin   Injectable 5000 Unit(s) SubCutaneous every 8 hours  hydrALAZINE 10 milliGRAM(s) Oral three times a day  insulin glargine Injectable (LANTUS) 48 Unit(s) SubCutaneous at bedtime  insulin lispro (ADMELOG) corrective regimen sliding scale   SubCutaneous three times a day before meals  insulin lispro (ADMELOG) corrective regimen sliding scale   SubCutaneous at bedtime  insulin lispro Injectable (ADMELOG) 25 Unit(s) SubCutaneous three times a day before meals  levalbuterol Inhalation 0.63 milliGRAM(s) Inhalation every 6 hours  lidocaine   4% Patch 1 Patch Transdermal daily  methylPREDNISolone sodium succinate Injectable 40 milliGRAM(s) IV Push two times a day  metoprolol succinate ER 50 milliGRAM(s) Oral daily  pantoprazole    Tablet 40 milliGRAM(s) Oral before breakfast  sodium zirconium cyclosilicate 10 Gram(s) Oral three times a day    MEDICATIONS  (PRN):  acetaminophen     Tablet .. 650 milliGRAM(s) Oral every 6 hours PRN Mild Pain (1 - 3), Moderate Pain (4 - 6), Severe Pain (7 - 10)  ALBUTerol    90 MICROgram(s) HFA Inhaler 2 Puff(s) Inhalation every 6 hours PRN Shortness of Breath and/or Wheezing  dextrose Oral Gel 15 Gram(s) Oral once PRN Blood Glucose LESS THAN 70 milliGRAM(s)/deciliter    No Known Allergies    Review of Systems:  Cardiovascular: No chest pain  Respiratory: +SOB  GI: No nausea, vomiting  Endocrine: no hypoglycemia     PHYSICAL EXAM:  Vital Signs Last 24 Hrs  T(C): 36.6 (04 Sep 2022 08:50), Max: 36.6 (03 Sep 2022 22:00)  T(F): 97.8 (04 Sep 2022 08:50), Max: 97.9 (03 Sep 2022 22:00)  HR: 89 (04 Sep 2022 09:50) (65 - 92)  BP: 125/60 (04 Sep 2022 08:50) (123/60 - 146/74)  BP(mean): --  RR: 18 (04 Sep 2022 08:50) (17 - 18)  SpO2: 100% (04 Sep 2022 09:50) (98% - 100%)    Parameters below as of 04 Sep 2022 09:47  Patient On (Oxygen Delivery Method): nasal cannula      GENERAL: NAD  EYES: No proptosis, no lid lag, anicteric  HEENT:  Atraumatic, Normocephalic, moist mucous membranes  RESPIRATORY: on nasal cannula, unlabored respirations       CAPILLARY BLOOD GLUCOSE    POCT Blood Glucose.: 257 mg/dL (04 Sep 2022 11:27)  POCT Blood Glucose.: 121 mg/dL (04 Sep 2022 08:06)  POCT Blood Glucose.: 373 mg/dL (03 Sep 2022 21:32)  POCT Blood Glucose.: 287 mg/dL (03 Sep 2022 17:10)  POCT Blood Glucose.: 245 mg/dL (03 Sep 2022 11:28)  POCT Blood Glucose.: 187 mg/dL (03 Sep 2022 07:25)  POCT Blood Glucose.: 401 mg/dL (03 Sep 2022 01:12)  POCT Blood Glucose.: 492 mg/dL (02 Sep 2022 23:04)  POCT Blood Glucose.: 380 mg/dL (02 Sep 2022 17:00)  POCT Blood Glucose.: 70 mg/dL (01 Sep 2022 16:32)  POCT Blood Glucose.: 129 mg/dL (01 Sep 2022 11:38)  POCT Blood Glucose.: 174 mg/dL (01 Sep 2022 07:37)  POCT Blood Glucose.: 266 mg/dL (01 Sep 2022 01:42)  POCT Blood Glucose.: 215 mg/dL (31 Aug 2022 22:11)  POCT Blood Glucose.: 377 mg/dL (31 Aug 2022 11:44)  POCT Blood Glucose.: 398 mg/dL (31 Aug 2022 11:43)  POCT Blood Glucose.: 255 mg/dL (31 Aug 2022 07:37)  POCT Blood Glucose.: 397 mg/dL (30 Aug 2022 23:34)  POCT Blood Glucose.: 280 mg/dL (30 Aug 2022 17:04)    09-04    136  |  92<L>  |  120<H>  ----------------------------<  120<H>  4.3   |  33<H>  |  1.86<H>    Ca    10.4      04 Sep 2022 05:48  Phos  3.1     09-04  Mg     2.30     09-04        A1C with Estimated Average Glucose Result: 10.7 % (08-23-22 @ 23:18)    Diet, Consistent Carbohydrate/No Snacks:   No Concentrated Potassium  Supplement Feeding Modality:  Oral  Nepro Cans or Servings Per Day:  1       Frequency:  Three Times a day (08-31-22 @ 15:25) [Active]   Labs/EKG/Imaging Studies/Medications

## 2023-08-21 NOTE — PATIENT PROFILE ADULT - ARE SIGNIFICANT INDICATORS COMPLETE.
Please notify the patient we are still part of the same group, I.e. [facility]. 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    Yes

## 2023-09-08 NOTE — PROGRESS NOTE ADULT - ASSESSMENT
SUBJECTIVE:   20 y.o. female for annual routine Pap and checkup. No complaints  No current outpatient medications on file.     No current facility-administered medications for this visit.     Allergies: Patient has no known allergies.   Patient's last menstrual period was 08/10/2023.    ROS:  Feeling well. No dyspnea or chest pain on exertion.  No abdominal pain, change in bowel habits, black or bloody stools.  No urinary tract symptoms. GYN ROS: normal menses, no abnormal bleeding, pelvic pain or discharge, no breast pain or new or enlarging lumps on self exam. No neurological complaints.    OBJECTIVE:   The patient appears well, alert, oriented x 3, in no distress.  /65   Pulse (!) 54   Wt 130.6 kg (288 lb)   LMP 08/10/2023   BMI 40.17 kg/m²   ENT normal.  Neck supple. No adenopathy or thyromegaly. PATRICE. Lungs are clear, good air entry, no wheezes, rhonchi or rales. S1 and S2 normal, no murmurs, regular rate and rhythm. Abdomen soft without tenderness, guarding, mass or organomegaly. Extremities show no edema, normal peripheral pulses. Neurological is normal, no focal findings.    BREAST EXAM: breasts appear normal, no suspicious masses, no skin or nipple changes or axillary nodes    PELVIC EXAM: VULVA: normal appearing vulva with no masses, tenderness or lesions, VAGINA: normal appearing vagina with normal color and discharge, no lesions, CERVIX: normal appearing cervix without discharge or lesions, UTERUS: uterus is normal size, shape, consistency and nontender, ADNEXA: normal adnexa in size, nontender and no masses, PAP: Pap smear done today, thin-prep method    ASSESSMENT:   well woman    PLAN:   pap smear  return annually or prn     73 y/o F with pmhx of COPD on home O2(3L) with severe pHTN, MVR/TVR , AF s/p ablation,  OHS/MIGUEL on home biPAP, HTN, HLD who presents with one week of vomiting with poor oral intake also complaining of SOB and chest pain.  She also states she has been taking prednisone on/off for the past 3 months for chest tightness and does endorse some weight gain.         #PULM  - Currently on home 3-4 L NC, No exp wheezing   - CXR likely fluid OL  - Continue proair inhalation  - c/w prednisone 5mg since on chronically for 3 months    AFLutter  -  s/p ELIZABETH/DCCV 11/4/22, currently junctional rhythm/ectopic atrial rhythm 40s - patient reports feeling ?weak  - Discontinue Metoprolol and Cardizem  - Adjust Amiodarone load to 200 mg BID for now  - s/p ELIZABETH/DCCV 11/4/22, currently junctional rhythm/ectopic atrial rhythm 50s - patient reports chronic dyspnea but no light-headedness, chest pain, palpitations   - Currently on Amiodarone 200 mg BID, will need close F/U of pulmonary status but okay to continue with underlying COPD   - Continue to dose Coumadin, INR today pending   - HRs improving, will monitor off of Diltiazem and Metoprolol; no plan for PPM at this time    MVR/TV replacement off AC  - echo technically difficult, unable to assess due to habitus and positioning  - per chart review under last name Nancy MRN [id number] - mechanical valve replacement 1999, was previously on coumadin but stopped in 09/2021 after worsening anemia, concern for GI bleed & epistaxis.   - continue with ac  -  ENDO  - A1c 10.7  - FS ~100-130  - Continue home Lantus and premeal, adjust as appropriate

## 2023-09-12 NOTE — CONSULT NOTE ADULT - PROVIDER SPECIALTY LIST ADULT
"Subjective     Marquis Lea is a 12 y.o. male who presents with Pharyngitis    A sore throat that began 1 day ago. It is a 6/10 pain. Nothing alleviates or aggravates it. Denies fevers, nausea, vomiting. Denies rhinorrhea, rhinitis, headache, body aches, chills. He has no sick contacts. No recent travel. This morning he used his albuterol nebulizer because his lungs felt tight and he was wheezing. The nebulizer greatly improved his work of breathing.        Pharyngitis    ROS  As above in HPI.         Objective     /60   Pulse 96   Temp 36.6 °C (97.8 °F) (Temporal)   Ht 1.636 m (5' 4.4\")   Wt 43.9 kg (96 lb 12.5 oz)   SpO2 99%   BMI 16.41 kg/m²      Physical Exam  Vitals reviewed.   HENT:      Head: Normocephalic.      Right Ear: Tympanic membrane and ear canal normal. There is no impacted cerumen.      Left Ear: Tympanic membrane and ear canal normal. There is no impacted cerumen.      Nose: Nose normal. No congestion or rhinorrhea.      Mouth/Throat:      Mouth: Mucous membranes are moist.      Pharynx: Posterior oropharyngeal erythema present. No oropharyngeal exudate.   Eyes:      General:         Right eye: No discharge.         Left eye: No discharge.      Conjunctiva/sclera: Conjunctivae normal.      Pupils: Pupils are equal, round, and reactive to light.   Cardiovascular:      Rate and Rhythm: Normal rate and regular rhythm.      Pulses: Normal pulses.      Heart sounds: Normal heart sounds.   Pulmonary:      Effort: Pulmonary effort is normal. No respiratory distress.      Breath sounds: Normal breath sounds. No decreased breath sounds, wheezing, rhonchi or rales.   Abdominal:      General: Abdomen is flat. Bowel sounds are normal.   Musculoskeletal:         General: Normal range of motion.      Cervical back: Normal range of motion.   Lymphadenopathy:      Cervical: No cervical adenopathy.   Skin:     General: Skin is warm.      Capillary Refill: Capillary refill takes less than 2 seconds.     "  Coloration: Skin is not pale.      Findings: No bruising, erythema or rash.   Neurological:      Mental Status: He is alert.   Psychiatric:         Mood and Affect: Mood normal.         Thought Content: Thought content normal.         Judgment: Judgment normal.                 Assessment & Plan   1. Sore throat  Sore throat x1 day. Neg for below    Given the child's symptomatology, the likelihood of a viral illness is high. The parents understand that the immune system is built to clear this type of infection. Parents understand that antibiotics will not change the course of this type of infection and that the patient's immune system is well suited to find this type of infection. The mainstay of therapy for viral infections is copious fluids, saline spray/ suction, rest, fever control, honey/ hylands for cough and frequent hand washing to avoid spread of the illness. Cool mist humidifier in the patient's bedroom will keep his mucous membranes healthy.     Reviewed signs of dehydration and respiratory issues. Follow up if symptoms persist/worsen, new symptoms develop (prolonged fever, ear pain, etc) or any other concerns arise.    - POCT GROUP A STREP, PCR                      ENT

## 2023-12-20 NOTE — PATIENT PROFILE ADULT - FALL HARM RISK CONCLUSION
----- Message from Sandra Grijalva Fall with Harm Risk

## 2024-01-10 NOTE — PROGRESS NOTE ADULT - ASSESSMENT
Assessment & Plan     Left testicular pain  His symptoms are suggestive of left epididymitis.  We are going to check a urinalysis and a gonorrhea and chlamydia test.  We decided to treat presumptively with Bactrim for 10 days.  If symptoms do not improve he will let me know and we will consider further workup.  - UA with Microscopic reflex to Culture - lab collect; Future  - Chlamydia & Gonorrhea by PCR, [facility] - Clinic Collect  - UA with Microscopic reflex to Culture - lab collect    Epididymitis, left  - sulfamethoxazole-trimethoprim (BACTRIM DS) 800-160 MG tablet; Take 1 tablet by mouth 2 times daily for 10 days    Mucocele lower lip  We discussed monitoring this or doing an I&D procedure.  He prefers to monitor this for now and will follow-up if it does not improve.               Michael Ibarra, [facility] is a 23 year old, presenting for the following health issues:  Testicular Problem        1/10/2024     3:03 PM   Additional Questions   Roomed by Lul GUZMAN       History of Present Illness       Reason for visit:  Testicle aches  Symptom onset:  1-2 weeks ago  Symptom intensity:  Mild  Symptom progression:  Staying the same  Had these symptoms before:  No    He eats 2-3 servings of fruits and vegetables daily.He consumes 0 sweetened beverage(s) daily.He exercises with enough effort to increase his heart rate 60 or more minutes per day.  He exercises with enough effort to increase his heart rate 6 days per week.   He is taking medications regularly.       He describes having left-sided testicular pain symptoms that started 1.5 weeks ago.  Symptoms started to improve, but then they became worse again last night.  He denies scrotal swelling, dysuria, hematuria, urinary frequency or penile discharge.  He denies noticing any bulges or masses.  He is not having fevers.  He denies any injury that may have caused this.  He is sexually active with his  "girlfriend.  He denies any pain with ejaculation.    He reports noticing a bump in his lower lip that looks to be a cyst.  It does not seem to be improving.  It is not painful          Review of Systems   Constitutional, HEENT, cardiovascular, pulmonary, GI, , musculoskeletal, neuro, skin, endocrine and psych systems are negative, except as otherwise noted.      Objective    /69   Pulse 61   Temp 97.7  F (36.5  C) (Temporal)   Resp 12   Ht 1.819 m (5' 11.6\")   Wt 87.1 kg (192 lb)   SpO2 95%   BMI 26.33 kg/m    Body mass index is 26.33 kg/m .  Physical Exam   GENERAL: healthy, alert and no distress  NECK: no adenopathy, no asymmetry, masses, or scars and thyroid normal to palpation  RESP: lungs clear to auscultation - no rales, rhonchi or wheezes  CV: regular rate and rhythm, normal S1 S2, no S3 or S4, no murmur, click or rub, no peripheral edema and peripheral pulses strong  ABDOMEN: soft, nontender, no hepatosplenomegaly, no masses and bowel sounds normal   (male): normal appearing male genitalia without lesions or urethral discharge, no hernia.  No scrotal or testicular masses.  He is tender over the superior portion of the left testicle.  MS: no gross musculoskeletal defects noted, no edema  SKIN: There is a mucocele in the inner, lower lip.  NEURO: Normal strength and tone, mentation intact and speech normal  PSYCH: mentation appears normal, affect normal/bright                      " conservative gmantment  no acute gi complaitns  fobt positive  tolerating a/c with stable hgb  high risk for endospic eval, would continue conserve gi manage

## 2024-01-12 NOTE — PROGRESS NOTE ADULT - PROBLEM/PLAN-5
----- Message from Yvette Alex sent at 2024  1:54 PM CST -----  Contact: self  Aditi Conway  MRN: [id number]  : 1984  PCP: Charles Huff  Home Phone      [phone]  Work Phone      Not on file.  Mobile          [phone]      MESSAGE:   Patient says she needs to get to work sooner than the scheduled apt date . But if there is a sooner apt please advise      [phone]     DISPLAY PLAN FREE TEXT

## 2024-02-02 NOTE — PROGRESS NOTE ADULT - PROVIDER SPECIALTY LIST ADULT
Pulmonology Bed/Stretcher in lowest position, wheels locked, appropriate side rails in place/Call bell, personal items and telephone in reach/Instruct patient to call for assistance before getting out of bed/chair/stretcher/Non-slip footwear applied when patient is off stretcher/[place] to call system/Physically safe environment - no spills, clutter or unnecessary equipment/Purposeful proactive rounding/Room/bathroom lighting operational, light cord in reach

## 2024-02-08 NOTE — PATIENT PROFILE ADULT - FUNCTIONAL ASSESSMENT - BASIC MOBILITY SECTION LABEL
HISTORY & PHYSICAL PRIOR TO GI (GASTROINTESTINAL) PROCEDURE      HISTORY OF PRESENT ILLNESS:  68 year old male with history of polyps w HGD in 2017 ultimately requiring L hemicolectomy as they were not fully removed endoscopically.    MEDICAL HISTORY  Significant medical/surgical history:   Past Medical History:   Diagnosis Date    Allergy 60 years ago    aspirin, penicllin and hay fever    Asthma     Colon polyps     Dupuytren contracture     injection to right by Dr. Frye, successful    Fracture age 13 approx     right arm     Hypertension     Osteoarthritis of left hip     RAD (reactive airway disease)     Thyroid condition     nodule- s/p thyroidectomy        Date of last Colonoscopy:    Past Surgical History:   Procedure Laterality Date    Appendectomy  as a youth     Colectomy  2017    Dr. Egan- laparoscopic partial hand assisted colon resection ([facility]) for pre-cancerous lesion     Colonoscopy  10/02/2018    Repeat 2-3 years due to his history of multiple high-grade dysplasia polyps    Colonoscopy      colonoscopy x3 - hx of benign small  polyp - 2016, colon polyp high dysplasia    Colonoscopy  2017    Colonoscopy  2016    Eye surgery      Fracture surgery Right     elbow-age 13 approx     Joint replacement Left 10/25/2022    hip    Total thyroidectomy      at [facility] after nodule found at physical       Past Complications with Sedation/Anesthesia:  no      Significant Family History:  Family History   Problem Relation Age of Onset    Parkinsonism Mother     Cancer Mother         colon cancer     Substance Abuse Father         smoker     Cancer Father         colon cancer     COPD Father          at 90 of COPD and failure to thrive, history of colon cancer    Heart Sister          at 50 of heart arrhythmia    Asthma Sister     Asthma Daughter        Social History     Socioeconomic History    Marital status: /Civil Union     Spouse name: Not on file     Number of children: Not on file    Years of education: Not on file    Highest education level: Not on file   Occupational History    Not on file   Tobacco Use    Smoking status: Never    Smokeless tobacco: Never    Tobacco comments:     second hand smoke as a child- father    Vaping Use    Vaping Use: never used   Substance and Sexual Activity    Alcohol use: Yes     Alcohol/week: 8.0 - 10.0 standard drinks of alcohol     Types: 8 - 10 Standard drinks or equivalent per week     Comment: soc    Drug use: No     Comment: No recreational drug use.    Sexual activity: Not on file   Other Topics Concern    Not on file   Social History Narrative    Lives with spouse and pet-Lab.     Social Determinants of Health     Financial Resource Strain: Not on file   Food Insecurity: Not on file   Transportation Needs: Not on file   Physical Activity: Not on file   Stress: Not on file   Social Connections: Not on file   Interpersonal Safety: Not on file (10/21/2022)       ALLERGIES:   Allergen Reactions    Aspirin HIVES    Penicillins Other (See Comments)     Pt unsure of reaction     Pollen Runny Nose       Current Facility-Administered Medications   Medication Dose Route Frequency Provider Last Rate Last Admin    lidocaine 1 % injection 5-10 mg  5-10 mg Subcutaneous PRN Jasper Hubbard CRNA        metoPROLOL tartrate (LOPRESSOR) tablet 25 mg  25 mg Oral Once PRN Jasper Hubbard CRNA        sodium chloride 0.9 % flush bag 25 mL  25 mL Intravenous PRN Jasper Hubbard CRNA        sodium chloride 0.9 % injection 2 mL  2 mL Intracatheter 2 times per day Jasper Hubbard CRNA        lactated ringers infusion   Intravenous Continuous Jasper Hubbard CRNA 100 mL/hr at 02/08/24 0938 New Bag at 02/08/24 0938    sterile water for irrigation solution    PRN Roslyn Ford MD   1,000 mL at 02/08/24 1003       REVIEW OF SYSTEMS:  A complete review of systems was performed and found to be negative except for  what was stated in the HPI (History of Present Illness).    PHYSICAL EXAM:  Vitals:  There were no vitals taken for this visit.   Constitutional:  Alert and oriented x3, NAD (no acute distress).  Skin:  No jaundice.  Skin is warm and dry on palpation.  Eyes:  PERRL (pupils equal, round, and reactive to light), EOMI (extraocular movements are intact).  Normal conjunctivae and lids, no scleral icterus.  Pulmonary:  Clear to auscultation bilaterally.  Cardiovascular:  S1 and S2, no murmur, regular rate on auscultation.   Abdomen:  Soft, nontender, no distention, bowel sounds present.  No hepatosplenomegaly, fullness, guarding or rebound noted.  Musculoskeletal:  Patient moving all extremities appropriately.  No cyanosis, clubbing or edema noted.   Neurologic:  Grossly nonfocal, CN (cranial nerves) 2-12 grossly intact, detailed neurological exam was not performed.     Assessment and Plan:  Patient was advised of the risks, complications, and benefits of the procedure including but not limited to bleeding, perforation, medication reaction, infection, and surgery.  The patient was advised of the alternatives of the procedures, as well as the possibility that a small cancerous polyp or tumor could be missed.  The patient does understand and agrees to the procedure.     Patient is a candidate for planned procedure Colonoscopy for surveillance    Plan for Sedation:  SHELTON Ford MD  2/8/2024     .

## 2024-03-20 NOTE — PATIENT PROFILE ADULT - PRO INTERPRETER NEED 2
Is the patient currently in the state of [place]? YES    Visit mode:VIDEO    If the visit is dropped, the patient can be reconnected by: VIDEO VISIT: Send to e-mail at: [email or web address]    Will anyone else be joining the visit? NO  (If patient encounters technical issues they should call [phone]    How would you like to obtain your AVS? MyChart    Are changes needed to the allergy or medication list? No    Reason for visit: RECHECK    Rochelle HENLEY       English

## 2024-05-09 NOTE — DISCHARGE NOTE PROVIDER - HOSPITAL COURSE
Patient/Caregiver provided printed discharge information. 71 year old hx of HTN, dCHF, MV and TV replacement, MIGUEL n, afib s/p ablation not on AC due to chronic bleeding, unable to do c-scope or endoscopy due to medical conditions, COPD on 3 L NC, here for dizziness. x1 week, feeling generally weakness . walkes with walker at home.  71 year old hx of HTN, dCHF, MV and TV replacement, MIGUEL not on CPAP due to unable to afford, afib s/p ablation not on AC due to chronic bleeding, unable to do c-scope or endoscopy due to medical conditions, COPD on 3 L NC, here for dizziness. x1 week, feeling generally weakness.   Pt was admitted for acute on chronic diastolic congestive heart failure and treated with IV Bumex. TTE with grossly normal LVSF. Course c/b MISAEL and Bumex was discontinued. PO Bumex was then continued.   Pt with a history of cJOPD although never a smoker, pt will need formal PFTs as an outpatient.    71 year old hx of HTN, dCHF, MV and TV replacement, MIGUEL not on CPAP due to unable to afford, afib s/p ablation not on AC due to chronic bleeding, unable to do c-scope or endoscopy due to medical conditions, COPD on 3 L NC, here for dizziness. x1 week, feeling generally weakness . Walks with walker at home.    Patient notes no falls or syncope, states she has felt this way before when she had "fluid in lungs" or when she was anemic.   Patient noted  nose bleed or 1 hr yesterday stopped  She was on coumadin 3 months ago for heart valves but was stopped - patient not sure why   She SOB when laying flat, legs more swollen, no cp. Denies fever, cough, recent illness, abdominal pain, melena or hematochezia.  Patient admitted with CHF, treated with bumex in ED, already notes less SOB  Patient noted she has been staying home due to COVID  She notes she did NOT take Covid vaccine- Pat counselled please to take vaccine (23 Dec 2021 14:38)      HOSPITAL COURSE:  Vital Signs Last 24 Hrs  T(C): 36.7 (03 Jan 2022 13:40), Max: 36.8 (03 Jan 2022 05:58)  T(F): 98.1 (03 Jan 2022 13:40), Max: 98.2 (03 Jan 2022 05:58)  HR: 68 (03 Jan 2022 13:40) (68 - 72)  BP: 126/69 (03 Jan 2022 13:40) (126/69 - 156/78)  BP(mean): --  RR: 18 (03 Jan 2022 13:40) (18 - 18)  SpO2: 98% (03 Jan 2022 13:40) (96% - 99%)        Pt was admitted for acute on chronic diastolic congestive heart failure and treated with IV Bumex. TTE with grossly normal LVSF. Course c/b MISAEL and Bumex was discontinued. PO Bumex was then continued.   Pt with a history of COPD although never a smoker, pt will need formal PFTs as an outpatient.    71F h/o COPD home O2, CHF, afib not on AC, mitral and tricuspid valve repair p/w dizziness. Admitted for AOC HF and COPD exacerbation. Pt was diuresed with IV Bumex and transitioned to PO Bumex once MISAEL resolved. TTE with grossly normal LVSF. LE dopplers neg for DVT. Repeat CXR showed improvement in pulm vascular congestion. Pt was seen by pulmonary and started on Solu-Medrol 40IV.  Patient with chronic hypercarbic respiratory failure secondary to COPD/obesity, added noninvasive ventilation QHS/during sleep while home to avoid further episodes of respiratory failure and rehospitalization. Pt was transitioned to PO prednisone taper by pulmonary. CT chest: Unchanged left apical ill-defined linear opacities. Right upper lobe 2 mm nodule, also noted 3.5 cm left upper pole renal mass concerning for neoplasm- outpt follow up per attg. RVP+ hMPV; Supportive care    AVAPS set up at home by CM    Discussed case with Dr. Newman on 1/13, pt stable for discharge as planned on 1/14. Transport set up for 11am

## 2024-06-07 NOTE — ED PROVIDER NOTE - CARDIAC, MLM
"          Clinical Nutrition Assessment     Patient Name: Mónica Carrillo  YOB: 1945  MRN: [id number]  Date of Encounter: 06/07/24 09:20 EDT  Admission date: 6/5/2024  Reason for Visit: MST score 2+, Consult, Reduced oral intake, \"Unsure\" unintentional weight loss    Assessment   Nutrition Assessment   Admission Diagnosis:  Symptomatic anemia [D64.9]    Problem List:    Symptomatic anemia    Essential hypertension    Hypothyroidism    Hyperlipidemia    Dementia      PMH:   She  has a past medical history of Dementia, Hypertension, Mood disorder, and Thyroid disease.    PSH:  She  has no past surgical history on file.    Applicable Nutrition History:   Stg 1 coccyx  Edentulous - ? dentures    Anthropometrics     Height: Height: 172.7 cm (68\")  Last Filed Weight: Weight: 71.1 kg (156 lb 11.2 oz) (06/05/24 2100)  Method: Weight Method: Bed scale  BMI: BMI (Calculated): 23.8    UBW:  UTD  Weight change:  UTD    Nutrition Focused Physical Exam    Date:  6/7    Unable to perform due to Pt unable to participate at time of visit, unable to consent to exam*     Subjective   Reported/Observed/Food/Nutrition Related History:     Pt up in chair at time of visit, attempted to evaluate nutrition/weight hx however due to dementia pt is a poor hxn. Most information obtained from medical records. Pt resides at SNF - receiving appetite stimulant without desirable response. Hospice was consulted however plan to return to SNF for therapies. NKFA    Current Nutrition Prescription   PO: Diet: Regular/House; Texture: Soft to Chew (NDD 3); Soft to Chew: Chopped Meat; Fluid Consistency: Thin (IDDSI 0)  Oral Nutrition Supplement: N/A  Intake:  16.67@ xlast 3 meal documented    Assessment & Plan   Nutrition Diagnosis   Date:  6/7            Updated:    Problem Inadequate oral intake    Etiology Advanced dementia   Signs/Symptoms Reported anoreixa and documented intake <25%    Status: New    Goal:   Nutrition to support " treatment and Tolerate PO      Nutrition Intervention      Follow treatment progress, Care plan reviewed, Encourage intake, Supplement provided    Encouraged intake on current diet as tolerated  Provide Boost Plus 2x daily    Monitoring/Evaluation:   Per protocol, I&O, PO intake, GI status, POC/GOC    Ekta Moctezuma MS,RD,LD  Time Spent: 25min   Bradycardic rate, regular rhythm.  Heart sounds S1, S2.  No murmurs, rubs or gallops.

## 2024-06-21 NOTE — PROGRESS NOTE ADULT - PROBLEM SELECTOR PLAN 2
-currently in Afib with slow ventricular response  -will hold metoprolol and digoxin  -EP f/u
-currently in Afib with slow ventricular response  -will hold metoprolol and digoxin  -EP f/u
oriented to person, place and time , normal sensation , short and long term memory intact
-currently in Afib with slow ventricular response  -will hold metoprolol and digoxin  -EP f/u

## 2024-10-11 NOTE — PROGRESS NOTE ADULT - PROBLEM/PLAN-3
Pt calling for instructions on how to take Metamucil and Miralax.  Per previous message below:   Per OV with JM on 9/10/24:    PLAN:    A high fiber diet with plenty of fluids (up to 8 glasses of water daily) is suggested to relieve these symptoms.  Metamucil, 1 tablespoon once or twice daily can be used to keep bowels regular.  Start Miralax 17g daily, titrate to have 2 BM daily    Discussed bowel regimen with pt.  Questions answered.  Verbalizes understanding.  Denies any further questions/concerns at this time.     DISPLAY PLAN FREE TEXT

## 2024-11-07 NOTE — H&P ADULT - PROBLEM/PLAN-4
[FreeTextEntry2] : Recently traveled from [facility] about 1 year ago; lived in [place] for three months, the [place] until 3 months ago. DISPLAY PLAN FREE TEXT

## 2024-11-14 NOTE — ED PROCEDURE NOTE - NS_EDPROVIDERDISPOUSERTYPE_ED_A_ED
"FUV for thyroid cancer. LV with me 2024    History of Present Illness  64 y.o. female with hx of PTC s/p total thyroidectomy (2011) and GRANADOS (2012), post-surgical hypothyroidism, and type 2 diabetes.    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      ROS  General: no fever or chills  CV: no chest pain   Respiratory: no shortness of breath  MSK: no lower extremity edema  Neuro: no headache or dizziness  See HPI for Endocrine ROS    Physical Exam   height is 1.626 m (5' 4\") and weight is 78.9 kg (174 lb). Her blood pressure is 130/79 and her pulse is 68.   General: not in acute distress  HEENT: ALISHA ESCOBAR  Thyroid: s/p total thyroidectomy, left level 3, 2 cm, mobile, soft nodule palpated (no lesion on US neck)  Neuro: alert and oriented x 3    Current Outpatient Medications   Medication Sig Dispense Refill    albuterol 90 mcg/actuation inhaler Inhale.      amLODIPine (Norvasc) 10 mg tablet Take 1 tablet (10 mg) by mouth once daily. 90 tablet 3    atorvastatin (Lipitor) 20 mg tablet TAKE 1 TABLET (20 MG) BY MOUTH ONCE DAILY. 90 tablet 2    calcium carbonate 600 mg calcium (1,500 mg) tablet Take 0.5 tablets (750 mg) by mouth 2 times a day with meals. 90 tablet 3    cetirizine (ZyrTEC) 10 mg tablet Take 1 tablet (10 mg) by mouth once daily. 30 tablet 11    cholecalciferol (Vitamin D-3) 50 MCG (2000 UT) tablet Take 1 tablet (50 mcg) by mouth once daily. 90 tablet 3    docusate sodium (Colace) 100 mg capsule TAKE 1 CAPSULE BY MOUTH DAILY 60 capsule 1    fluticasone (Flonase) 50 " mcg/actuation nasal spray Administer into affected nostril(s).      furosemide (Lasix) 40 mg tablet Take 1 tablet (40 mg) by mouth 2 times a day.      gabapentin (Neurontin) 300 mg capsule Take 1 capsule (300 mg) by mouth once daily at bedtime. 30 capsule 11    hydroxyurea (Hydrea) 500 mg capsule 3 Tablet      hydrOXYzine HCL (Atarax) 10 mg tablet Take 1 tablet (10 mg) by mouth if needed for itching. Can take 1 tablet during the day IF NEEDED for intense pruritus. Do not take if driving, cooking, or operating machinery. 30 tablet 0    levothyroxine (Synthroid, Levoxyl) 125 mcg tablet Take 1 tablet (125 mcg) by mouth early in the morning.. Take on an empty stomach at the same time each day, either 30 to 60 minutes prior to breakfast 90 tablet 3    lisinopril 40 mg tablet TAKE 1 TABLET BY MOUTH ONCE DAILY AS DIRECTED 90 tablet 3    metFORMIN (Glucophage) 1,000 mg tablet Take 1 tablet (1,000 mg) by mouth once daily. 30 tablet 3    hydrOXYzine HCL (Atarax) 25 mg tablet Take 1 tablet (25 mg) by mouth once daily at bedtime. 30 tablet 0     No current facility-administered medications for this visit.     Assessment and Plan  64 y.o. female with hx of PTC s/p total thyroidectomy (2011) and GRANADOS (2012), post-surgical hypothyroidism, and type 2 diabetes.    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, +hurthle cell features, no LN involvement, +angiolymph invasion (*per previous provider notes; pathology report unavailable)  2012: GRANADOS (100 mCi)   Stimulated T.4 ()    WBS: uptake in thyroid bed only  2014: Stimulated Tg 36, Tg< 0.2  2020:    Stimulated T.3 ()   WBS-faint focus in left thyroid bed  2023:     CT neck wo contrast: no cervical lymphadenopathy    CT chest: subcentimeter pulmonary nodules stable since multiple prior exams  2024    US neck: no lesions in thyroid bed; no suspicious cervical lymph nodes  Current  regimen: levothyroxine 125 mcg/day (since 08/2024)    Labs from 05/2024  TSH 28  Tg 0.1  Tg Ab ng    Labs from 08/2024  TSH 15.49    Labs from 11/2024  TSH 0.21  Tg pending    Tg has been undetectable or low at 0.1 since 2017.  Taking LT4 appropriately now (taking calcium at least 4 hours away).  TSH slightly lower than goal, but patient does not report any symptoms of hyperthyroidism, so will continue with current dose.    PLAN:  -continue levothyroxine 125 mcg/day  -check TSH, Tg, Tg Ab before next visit   -TSH goal ~0.5  -diabetes managed by PCP    Will call with results.  Follow-up in 6 months (labs prior)    ADDENDUM 12/6/2024  Tg <0.1  Tg Ab < 0.1     Attending Attestation (For Attendings USE Only)...

## 2024-12-02 NOTE — H&P ADULT - PROBLEM/PLAN-10
How Severe Are Your Spot(S)?: mild What Is The Reason For Today's Visit?: Upper Body Skin Exam DISPLAY PLAN FREE TEXT

## 2025-02-15 NOTE — DISCHARGE NOTE PROVIDER - NPI NUMBER (FOR SYSADMIN USE ONLY) :
Patient's first and last name, , procedure, and correct site confirmed prior to the start of procedure. Patient's first and last name, , procedure, and correct site confirmed prior to the start of procedure. [[id number]]

## 2025-03-14 NOTE — PATIENT PROFILE ADULT - NSTOBACCONEVERSMOKERY/N_GEN_A
Reason for visit:  Chief Complaint   Patient presents with    Office Visit    New Patient    Ear Problem     Patient visit regarding left ear pain. Patient's primary doctor told her left ear pain could cause by her sinus issue. Sometimes patient states that there is pain in the right ear       Referred from: Guanakito Santacruz MD Jamal R Ross, MD        HPI:  \"Ear\" pain, mostly left x several months  No hearing loss  No drainage  Pain is in front of the ear  Increased with opening and closing mouth    Data Reviewed:  Data reviewed:  PCP notes, radiology report(s), and laboratory data      Review of Systems:  ROS    Past Medical History:  Active Ambulatory Problems     Diagnosis Date Noted    No Active Ambulatory Problems     Resolved Ambulatory Problems     Diagnosis Date Noted    No Resolved Ambulatory Problems     Past Medical History:   Diagnosis Date    High cholesterol        Past Surgical History:  Past Surgical History:   Procedure Laterality Date    Hysterectomy         Family History:  Family History   Problem Relation Age of Onset    Patient is unaware of any medical problems Mother     Patient is unaware of any medical problems Father        Social History:  Social History     Socioeconomic History    Marital status:      Spouse name: Not on file    Number of children: Not on file    Years of education: Not on file    Highest education level: Not on file   Occupational History    Not on file   Tobacco Use    Smoking status: Never    Smokeless tobacco: Not on file   Substance and Sexual Activity    Alcohol use: Never    Drug use: Never    Sexual activity: Not on file   Other Topics Concern    Not on file   Social History Narrative    Not on file     Social Determinants of Health     Financial Resource Strain: Not on file   Food Insecurity: Not on file   Transportation Needs: Not on file   Physical Activity: Not on file   Stress: Not on file   Social Connections: Not on file   Interpersonal Safety: Not  on file       Medications:  Current Outpatient Medications   Medication Sig Dispense Refill    atorvastatin (LIPITOR) 40 MG tablet Take 40 mg by mouth daily.      acetaminophen (TYLENOL) 325 MG tablet Take 325 mg by mouth.       No current facility-administered medications for this visit.       Vitals:  Vitals:    03/14/25 1341   BP: 120/68   Pulse: 87   Resp: 16     There were no vitals filed for this visit.  There is no height or weight on file to calculate BMI.    Physical Exam:  Physical Exam  Vitals reviewed.   Constitutional:       General: She is not in acute distress.  HENT:      Head: Normocephalic and atraumatic.      Salivary Glands: Right salivary gland is not diffusely enlarged or tender. Left salivary gland is not diffusely enlarged or tender.      Right Ear: Tympanic membrane and external ear normal. No drainage, swelling or tenderness. No middle ear effusion. No mastoid tenderness.      Left Ear: Tympanic membrane and external ear normal. No drainage, swelling or tenderness.  No middle ear effusion. No mastoid tenderness.      Ears:      Comments: Ears   Right External Ear    normal - no skin lesion, normal shape and size, no lacerations or hematoma   Left External Ear    normal - no skin lesion, normal shape and size, no lacerations or hematoma   Right External Ear Canal    Normal   Left External Ear Canal    Normal     Right Tympanic Membrane    Normal - normal appearance, translucent, mobile, aerated   Left Tympanic Membrane    Normal - normal appearance, translucent, mobile, aerated     Right Middle Ear    Normal - aerated, TM mobile, ossicles appear normal   Left Middle Ear    Normal - aerated, TM mobile, ossicles appear normal     Right Mastoid    normal - no tenderness or swelling   Left Mastoid    normal - no tenderness or swelling             Nose: Nose normal. No nasal deformity, septal deviation, mucosal edema or rhinorrhea.      Right Sinus: No maxillary sinus tenderness or frontal sinus  tenderness.      Left Sinus: No maxillary sinus tenderness or frontal sinus tenderness.      Mouth/Throat:      Mouth: Mucous membranes are moist. No oral lesions.      Pharynx: Oropharynx is clear. Uvula midline. No oropharyngeal exudate.   Eyes:      General: No scleral icterus.        Right eye: No discharge.         Left eye: No discharge.      Conjunctiva/sclera: Conjunctivae normal.      Pupils: Pupils are equal, round, and reactive to light.   Neck:      Thyroid: No thyroid mass or thyromegaly.      Trachea: Trachea and phonation normal. No tracheal tenderness or tracheal deviation.   Pulmonary:      Effort: Pulmonary effort is normal. No tachypnea, accessory muscle usage or respiratory distress.   Musculoskeletal:      Cervical back: Normal range of motion and neck supple. No rigidity.   Lymphadenopathy:      Cervical: No cervical adenopathy.   Skin:     General: Skin is warm and dry.   Neurological:      General: No focal deficit present.      Mental Status: She is alert and oriented to person, place, and time.      Cranial Nerves: No cranial nerve deficit.      Motor: No weakness.      Gait: Gait is intact.      Deep Tendon Reflexes: Reflexes are normal and symmetric.   Psychiatric:         Attention and Perception: Attention normal.         Mood and Affect: Mood and affect normal.         Speech: Speech normal.         Behavior: Behavior normal.     tenderness of TM joints  With palpation and motion        Assessment / Plan:    TMJ (temporomandibular joint syndrome)  (primary encounter diagnosis)            Orders Placed this encounter  No orders of the defined types were placed in this encounter.     There are no discontinued medications.           Discussion:  Education of the nature of the patient's disease process provided.  Options for treatment discussed and understood.  All questions were answered.    reassured    MDM            Follow up:  Prn  See dentist prn    CC:  Guanakito Santacruz MD Jamal R  MD Noam          No

## 2025-04-14 NOTE — PROGRESS NOTE ADULT - PROBLEM SELECTOR PLAN 4
Your Child's Health  FOUR-MONTH-OLD VISIT       Doe Arevalo  April 14, 02/03/2025   • Hib (PRP-OMP) 02/03/2025   • Pneumococcal conjugate PCV20 02/03/2025   Pended Date(s) Pended   • DTaP/Hep B/IPV 04/14/2025   • Hib (PRP-OMP) 04/14/2025   • Pneumococcal conjugate PCV20 04/14/[facility] [place][note 2].    Also, check out “Children’s Health” on the [facility] [place][note 2] Blog for updates on timely topics regarding children’s health!              Cardiology following

## 2025-06-26 NOTE — PHYSICAL THERAPY INITIAL EVALUATION ADULT - NS ASR RISK AREAS PT EVAL
Subjective   Patient ID: Kamilah Manzo is a 64 y.o. female who presents for Hospital Follow-up (TCM ) and Fatigue.  Fatigue  Associated symptoms include fatigue.     TCM    Dx pneumonia       On meds feeling better but still tired       No fever, wheezing       Less dyspnea and cough       On oxygen 2 l nc at night and with exertion    Rec's rev'd                h/o chest pain, htn resolved          Under stress /           Cardio follow up with stress test neg          EKG SR 80  5-24             DM -2 uncontrolled          HBA1C 8.1  6-25           this am          on metformin tolerating well         Jardiance caused genital redness / stopped          On Ozempic 0.5 weekly           No side effects                  hypertension better on rx no side effects                   Paraesophageal hernia          Surgical consult / not done yet     asthma on rx no side effects     Smoking cessation quit     hypercholesterolemia on rx no side effects      vit D stable on rx no side effects     H/o seizures in 20's  No cause found and samantha since     diet / exercise rev'd     eyes and feet on follow up     check CT urogram not done (too $$$)  urology consult not done    Review of Systems   Constitutional:  Positive for fatigue.   All other systems reviewed and are negative.      Objective   /76   Pulse 89   Temp 36.3 °C (97.3 °F)   Wt 108 kg (238 lb 12.8 oz)   LMP  (LMP Unknown)   SpO2 95%   BMI 38.54 kg/m²   Lab Results   Component Value Date    WBC 9.7 06/23/2025    HGB 14.7 06/23/2025    HCT 45.2 06/23/2025     06/23/2025    CHOL 153 11/29/2024    TRIG 213 (H) 11/29/2024    HDL 40.3 11/29/2024    ALT 39 06/21/2025    AST 25 06/21/2025     06/23/2025    K 4.6 06/23/2025     06/23/2025    CREATININE 0.59 06/23/2025    BUN 13 06/23/2025    CO2 25 06/23/2025    TSH 1.39 11/29/2024    INR 1.0 07/21/2023    HGBA1C 8.1 (H) 06/21/2025           Physical Exam  Vitals reviewed.    Constitutional:       Appearance: Normal appearance. She is obese.   HENT:      Head: Normocephalic and atraumatic.      Mouth/Throat:      Pharynx: No posterior oropharyngeal erythema.   Eyes:      General: No scleral icterus.     Conjunctiva/sclera: Conjunctivae normal.      Pupils: Pupils are equal, round, and reactive to light.   Cardiovascular:      Rate and Rhythm: Normal rate and regular rhythm.      Heart sounds: Normal heart sounds.   Pulmonary:      Effort: No respiratory distress.      Breath sounds: No wheezing.   Abdominal:      General: Abdomen is flat. Bowel sounds are normal. There is no distension.      Palpations: Abdomen is soft. There is no mass.      Tenderness: There is no abdominal tenderness. There is no rebound.   Musculoskeletal:         General: Normal range of motion.      Cervical back: Normal range of motion and neck supple.   Skin:     General: Skin is warm and dry.   Neurological:      General: No focal deficit present.      Mental Status: She is alert and oriented to person, place, and time. Mental status is at baseline.   Psychiatric:         Mood and Affect: Mood normal.         Behavior: Behavior normal.         Thought Content: Thought content normal.         Judgment: Judgment normal.         Problem List Items Addressed This Visit           ICD-10-CM    Hypertension I10    Obese E66.9    Pneumonia due to infectious organism, unspecified laterality, unspecified part of lung - Primary J18.9    Relevant Orders    XR chest 2 views     Other Visit Diagnoses         Codes      Type 2 diabetes mellitus with other specified complication, without long-term current use of insulin     E11.69      Moderate asthma without complication, unspecified whether persistent ([facility])     J45.909          Assessment/Plan     TCM    Dx pneumonia       On meds feeling better but still tired       No fever, wheezing       Less dyspnea and cough       On oxygen 2 l nc at night and with exertion        Check chest xray in a month    Rec's rev'd                h/o chest pain, htn resolved          Under stress /           Cardio follow up with stress test neg          EKG SR 80  5-24             DM -2 uncontrolled          HBA1C 8.1  6-25           this am          on metformin tolerating well         Jardiance caused genital redness / stopped          On Ozempic 0.5 weekly           No side effects                  hypertension better on rx no side effects                   Paraesophageal hernia          Surgical consult / not done yet     asthma on rx no side effects     Smoking cessation quit     hypercholesterolemia on rx no side effects      vit D stable on rx no side effects     H/o seizures in 20's  No cause found and samantha since     diet / exercise rev'd     eyes and feet on follow up     check CT urogram not done (too $$$)  urology consult not done        mammogram 9-24    dexa 9-24 penia  colonoscopy 2-25  recheck 2035  GYN n/a  CT lung cancer screening n/a  immunizations rev'd UTD  BMI 38.5    Check chest x ray before appt    Follow up 1 month        fall

## 2025-07-11 NOTE — PROVIDER CONTACT NOTE (CRITICAL VALUE NOTIFICATION) - TEST AND RESULT REPORTED:
Name: Jessica Arana YOB: 1973    Primary Care Physician: Lillian Roa APNP    HISTORY OF PRESENT ILLNESS    SUBJECTIVE:  Reason for visit:   Jessica Arana is a pleasant 51 year old female who presents to the office today for the following:  Chief Complaint   Patient presents with   • Follow-up     Autoimmune hepatitis       History of Present Illness  The patient presents to the office today for an annual visit for autoimmune hepatitis. She was diagnosed in 2021 and underwent a liver biopsy, which showed severe lymphoplasmacytic lobular activity with frequent eosinophil bodies and confluent necrosis consistent with autoimmune hepatitis. The biopsy also revealed no significant steatosis, periportal, and early bridging fibrosis.    She reports no adverse reactions to her medications, including nausea or vomiting. She has not attempted to discontinue budesonide. Prednisone use resulted in significant weight gain. Currently, she is on a regimen of 6-MP 50 mg once daily and budesonide 6 mg twice daily. Initially, she was started on 6-MP 50 mg and a taper of prednisone, which was weaned off in 2023, and she was then started on budesonide 6 mg daily.    SOCIAL HISTORY  Occupation: Works two part-time jobs.       Treatments tried:  6-MP, prednisone, budesonide    Review of Symptoms:  Abdominal pain: No  Abdominal bloating: No  Last BM yesterday # 5 on the [place] Stool Scale  # BMs Daily: 1-2 every 2 days   Color: didn't look  Constipation: Yes  Diarrhea: No  Rectal Pain: No  Rectal Bleeding: No  Nausea: No  Vomiting: No  Dysphagia: No  Throat Pain: No  Gerd: No    Last Colon: 22 with Dr. Bower for colon cancer screening   ENDOSCOPIC DIAGNOSIS:  1. Normal colonoscopy.     RECOMMENDATIONS:  1.  High-fiber diet.   2.  Will recommend a colonoscopy for colon cancer screening again in 10 years.    Biopsies showed:  None    Last EGD: None     Biopsies showed:  N/A    Imagin24  CT ABDOMEN PELVIS W CONTRAST    Impression  Abnormal appendix. The appendix is mildly dilated measuring 9 mm in caliber  and accompanied by minimal periappendiceal fat stranding. A tandem array of  small appendicoliths are present at the appendiceal base. No extraluminal  gas or drainable fluid collection is present. Findings are consistent with  subtle early acute appendicitis. Results were communicated to Dr. Jem Ibarra at 7/23/2024 3:53 PM.    LABS:  CBC:   WBC   Date Value Ref Range Status   01/28/2025 4.0 (L) 4.2 - 11.0 K/mcL Final   07/24/2024 6.0 4.2 - 11.0 K/mcL Final   07/23/2024 7.1 4.2 - 11.0 K/mcL Final     RBC   Date Value Ref Range Status   01/28/2025 3.59 (L) 4.00 - 5.20 mil/mcL Final   07/24/2024 3.50 (L) 4.00 - 5.20 mil/mcL Final   07/23/2024 4.03 4.00 - 5.20 mil/mcL Final     HGB   Date Value Ref Range Status   01/28/2025 13.2 12.0 - 15.5 g/dL Final   07/24/2024 12.6 12.0 - 15.5 g/dL Final   07/23/2024 14.7 12.0 - 15.5 g/dL Final     HCT   Date Value Ref Range Status   01/28/2025 39.3 36.0 - 46.5 % Final   07/24/2024 36.6 36.0 - 46.5 % Final   07/23/2024 41.2 36.0 - 46.5 % Final     MCV   Date Value Ref Range Status   01/28/2025 109.5 (H) 78.0 - 100.0 fl Final   07/24/2024 104.6 (H) 78.0 - 100.0 fl Final   07/23/2024 102.2 (H) 78.0 - 100.0 fl Final     PLT   Date Value Ref Range Status   01/28/2025 251 140 - 450 K/mcL Final   07/24/2024 198 140 - 450 K/mcL Final   07/23/2024 235 140 - 450 K/mcL Final     and CMP:   Sodium   Date Value Ref Range Status   07/24/2024 138 135 - 145 mmol/L Final   07/23/2024 138 135 - 145 mmol/L Final   09/12/2022 140 135 - 145 mmol/L Final     Potassium   Date Value Ref Range Status   07/24/2024 4.5 3.4 - 5.1 mmol/L Final   07/23/2024 4.1 3.4 - 5.1 mmol/L Final   09/12/2022 4.0 3.4 - 5.1 mmol/L Final     BUN   Date Value Ref Range Status   07/24/2024 14 6 - 20 mg/dL Final   07/23/2024 14 6 - 20 mg/dL Final   09/12/2022 14 6 - 20 mg/dL Final     Creatinine   Date  H & H - 6.2; 23.1 Value Ref Range Status   07/24/2024 0.77 0.51 - 0.95 mg/dL Final   07/23/2024 0.69 0.51 - 0.95 mg/dL Final   09/12/2022 0.71 0.51 - 0.95 mg/dL Final     Glomerular Filtration Rate   Date Value Ref Range Status   07/24/2024 >90 >=60 Final     Comment:     eGFR results = or >60 mL/min/1.73m2 = Normal kidney function. Estimated GFR calculated using the CKD-EPI-R (2021) equation that does not include race in the creatinine calculation.   07/23/2024 >90 >=60 Final     Comment:     eGFR results = or >60 mL/min/1.73m2 = Normal kidney function. Estimated GFR calculated using the CKD-EPI-R (2021) equation that does not include race in the creatinine calculation.   09/12/2022 >90 >=60 Final     Comment:     eGFR results = or >60 mL/min/1.73m2 = Normal kidney function. Estimated GFR calculated using the CKD-EPI-R (2021) equation that does not include race in the creatinine calculation.     Bilirubin, Total   Date Value Ref Range Status   01/28/2025 0.4 0.2 - 1.0 mg/dL Final   07/23/2024 0.6 0.2 - 1.0 mg/dL Final   07/01/2024 0.5 0.2 - 1.0 mg/dL Final     GOT/AST   Date Value Ref Range Status   01/28/2025 21 <=37 Units/L Final   07/23/2024 24 <=37 Units/L Final   07/01/2024 20 <=37 Units/L Final     GPT/ALT   Date Value Ref Range Status   01/28/2025 22 <64 Units/L Final   07/23/2024 30 <64 Units/L Final   07/01/2024 26 <64 Units/L Final     Alkaline Phosphatase   Date Value Ref Range Status   01/28/2025 91 45 - 117 Units/L Final   07/23/2024 101 45 - 117 Units/L Final   07/01/2024 104 45 - 117 Units/L Final     Albumin   Date Value Ref Range Status   01/28/2025 4.5 3.4 - 5.0 g/dL Final   07/23/2024 4.3 3.6 - 5.1 g/dL Final   07/01/2024 4.0 3.6 - 5.1 g/dL Final     Protein, Total   Date Value Ref Range Status   01/28/2025 7.9 6.4 - 8.2 g/dL Final   07/23/2024 8.9 (H) 6.4 - 8.2 g/dL Final   07/01/2024 8.0 6.4 - 8.2 g/dL Final        Current medications:   Current Outpatient Medications   Medication Sig   • budesonide (ENTOCORT  EC) 3 MG 24 hr capsule Take 2 capsules by mouth every morning.   • mercaptopurine (PURINETHOL) 50 MG tablet Take 1 tablet by mouth daily.   • acetaminophen (TYLENOL) 325 MG tablet Take 650 mg by mouth every 4 hours as needed for Pain.   • Magnesium 400 MG Tab Take 400 mg by mouth at bedtime.   • estradiol 0.2 mg/g (0.02 %) cream (in natural base) Apply 1 gram daily onto urethra 3 times weekly.   • valACYclovir (VALTREX) 500 MG tablet Take 1 tablet by mouth daily.   • losartan (COZAAR) 50 MG tablet Take 1 tablet by mouth daily.     No current facility-administered medications for this visit.        ALLERGIES:   Allergen Reactions   • Sulfa Antibiotics MYALGIA   • Chloraprep One Step RASH   • Adhesive   (Environmental) RASH   • Chlorhexidine Gluconate RASH          OBJECTIVE:  Visit Vitals  /58 (BP Location: LUE - Left upper extremity, Patient Position: Sitting, Cuff Size: Regular)   Pulse 73   Ht 5' 3\" (1.6 m)   Wt 74 kg (163 lb 4 oz)   SpO2 98%   BMI 28.92 kg/m²       Wt Readings from Last 3 Encounters:   07/11/25 74 kg (163 lb 4 oz)   03/06/25 80.7 kg (178 lb)   02/07/25 79.8 kg (175 lb 14.8 oz)       PHYSICAL EXAM:  GENERAL: Patient is alert and oriented times 3. Mood is good, affect is good.  RESPIRATORY: Respirations are even and unlabored.  ABDOMEN: Soft and nontender, normoactive bowel sounds noted.  EXTREMITIES: Show good range of motion of all extremities. Lower extremities showed no peripheral edema.    ASSESSMENT/PLAN:  1. Hepatitis, autoimmune  (CMD)    2. High risk medication use        Assessment & Plan  1. Autoimmune Hepatitis:  Sable on current regimen. Will update labs today.  - Continue 6-MP 50 mg once daily.  - Continue budesonide 6 mg daily.  - Prescriptions sent to pharmacy.  - Conduct laboratory tests today to monitor liver enzymes.  - Consider gradual reduction in budesonide dosage if liver enzymes remain stable.    Follow-up: Annual visit in 07/2026.       Orders Placed This Encounter    • CBC with Automated Differential   • Comprehensive Metabolic Panel   • CBC with Automated Differential   • Comprehensive Metabolic Panel   • CBC with Automated Differential   • Comprehensive Metabolic Panel   • budesonide (ENTOCORT EC) 3 MG 24 hr capsule   • mercaptopurine (PURINETHOL) 50 MG tablet          No follow-ups on file.

## (undated) DEVICE — SUT PROLENE 5-0 30" RB-2

## (undated) DEVICE — SUT BIOSYN 4-0 18" P-12

## (undated) DEVICE — DRAPE 3/4 SHEET W REINFORCEMENT 56X77"

## (undated) DEVICE — SUCTION TUBE CARDIAC SOFT TIP 6FR SHAFT 10FR TIP 6"

## (undated) DEVICE — SUMP PERICARDIAL 20FR 1/4" ADULT

## (undated) DEVICE — DEFIBRILLATOR PAD PRE-CONNECT ADULT/CHILD

## (undated) DEVICE — POSITIONER FOAM EGG CRATE ULNAR 2PCS (PINK)

## (undated) DEVICE — DRAPE IOBAN 23" X 23"

## (undated) DEVICE — SUT PROLENE 3-0 36" SH

## (undated) DEVICE — SAW BLADE MICROAIRE STERNUM 1X34X9.4MM

## (undated) DEVICE — PACK UNIVERSAL CARDIAC SUPPLEMNTAL B

## (undated) DEVICE — BLADE SCALPEL SAFETYLOCK #11

## (undated) DEVICE — TUBING SUCTION 20FT

## (undated) DEVICE — NDL COUNTER FOAM AND MAGNET 40-70

## (undated) DEVICE — FOLEY HOLDER STATLOCK 3 WAY ADULT

## (undated) DEVICE — VENODYNE/SCD SLEEVE CALF LARGE

## (undated) DEVICE — BLADE SCALPEL SAFETYLOCK #15

## (undated) DEVICE — GLV 8 PROTEXIS (WHITE)

## (undated) DEVICE — SUT PROLENE 4-0 36" BB

## (undated) DEVICE — STAPLER SKIN VISI-STAT 35 WIDE

## (undated) DEVICE — TUBING ATS SUCTION LINE

## (undated) DEVICE — SUT PLEDGET PRE PUNCH 4.8 X 9.5 X 1.5 MM

## (undated) DEVICE — SOL NORMOSOL-R PH7.4 1000ML

## (undated) DEVICE — CONNECTOR "Y" 1/4 X 1/4 X 1/4"

## (undated) DEVICE — GLV 7 PROTEXIS (WHITE)

## (undated) DEVICE — BULLDOG SPRING CLIP 6MM SOFT/SOFT

## (undated) DEVICE — ELCTR HEX BLADE

## (undated) DEVICE — DRAPE MAYO STAND 30"

## (undated) DEVICE — SUT SOFSILK 0 30" V-20

## (undated) DEVICE — VESSEL LOOP EXTRA MAXI-BLUE 0.200" X 22"

## (undated) DEVICE — Device

## (undated) DEVICE — DRSG OPSITE 2.5 X 2"

## (undated) DEVICE — MEDICATION LABELS W MARKER

## (undated) DEVICE — FOLEY TRAY 16FR 5CC LF LUBRISIL ADVANCE TEMP CLOSED

## (undated) DEVICE — TOURNIQUET SET 12FR (1 RED, 1 BLUE, 1 SNARE) 7"

## (undated) DEVICE — DRAPE INSTRUMENT POUCH 6.75" X 11"

## (undated) DEVICE — SUT POLYSORB 2 30" GS-26

## (undated) DEVICE — DRAIN CHANNEL 19FR ROUND FULL FLUTED

## (undated) DEVICE — WARMING BLANKET FULL UNDERBODY

## (undated) DEVICE — SPECIMEN CONTAINER 100ML

## (undated) DEVICE — ELCTR BOVIE TIP CLEANER SCRATCH PAD

## (undated) DEVICE — SOL IRR POUR H2O 250ML

## (undated) DEVICE — SUT PROLENE 4-0 36" SH

## (undated) DEVICE — SUT POLYSORB 2-0 30" GS-21 UNDYED

## (undated) DEVICE — PREP CHLORAPREP HI-LITE ORANGE 26ML

## (undated) DEVICE — SOL IRR POUR NS 0.9% 500ML

## (undated) DEVICE — STEALTH CLAMP INSERT FIBRA/FIBRA 90MM

## (undated) DEVICE — DRSG STERISTRIPS 0.5 X 4"

## (undated) DEVICE — GOWN TRIMAX XXL

## (undated) DEVICE — GLV 8.5 PROTEXIS (WHITE)

## (undated) DEVICE — DRAPE C ARM UNIVERSAL

## (undated) DEVICE — STEALTH CLAMP INSERT FIBRA/FIBRA 60MM

## (undated) DEVICE — LAP PAD 18 X 18"

## (undated) DEVICE — VISITEC 4X4

## (undated) DEVICE — CHEST DRAIN PLEUR-EVAC WET/WET ADULT-PEDS SINGLE (QUICK)

## (undated) DEVICE — STOPCOCK 4-WAY W SWIVEL MALE LUER LOCK NON VENTED RED CAP

## (undated) DEVICE — SUT SOFSILK 2 60" TIES

## (undated) DEVICE — DRAPE TOWEL BLUE 17" X 24"

## (undated) DEVICE — SUT POLYSORB 0 36" GU-46

## (undated) DEVICE — DRAPE FEMORAL ANGIOGRAPHY W TROUGH

## (undated) DEVICE — NDL TAPR FR EYE 1/2 CIR 3

## (undated) DEVICE — URETERAL CATH RED RUBBER 8FR

## (undated) DEVICE — PACING CABLE TEMP MEDTRONIC WITH PAC-LOC

## (undated) DEVICE — ELCTR REM POLYHESIVE ADULT PT RETURN 15FT

## (undated) DEVICE — SUCTION YANKAUER NO CONTROL VENT

## (undated) DEVICE — DRAIN PLEUROVAC CHEST DRAINAGE PEDI

## (undated) DEVICE — PACING CABLE (BLUE) ATRIAL TEMP SCREW DOWN 12FT

## (undated) DEVICE — GOWN LG

## (undated) DEVICE — DRSG TEGADERM 6"X8"

## (undated) DEVICE — BLADE SCALPEL SAFETYLOCK #10

## (undated) DEVICE — SUT TICRON 5 30" KV-40

## (undated) DEVICE — SUT PROLENE 5-0 36" RB-1

## (undated) DEVICE — SAW BLADE MICROAIRE STERNUM 1.1X50X42MM

## (undated) DEVICE — GLV 6.5 PROTEXIS (WHITE)

## (undated) DEVICE — DRSG OPSITE 13.75 X 4"

## (undated) DEVICE — DRAPE LIGHT HANDLE COVER (BLUE)

## (undated) DEVICE — SYR ASEPTO

## (undated) DEVICE — FEEDING TUBE NG SUMP 16FR 48"

## (undated) DEVICE — GLV 7.5 PROTEXIS (WHITE)

## (undated) DEVICE — DRAPE 1/2 SHEET 40X57"

## (undated) DEVICE — ELCTR BOVIE PENCIL HANDPIECE ROCKER SWITCH 15FT

## (undated) DEVICE — GLV 8 RADIATION

## (undated) DEVICE — DRAIN CHANNEL 32FR ROUND HUBLESS FULL FLUTED

## (undated) DEVICE — PACK UNIVERSAL CARDIAC

## (undated) DEVICE — WARMING BLANKET DUO-THERM HYPER/HYPOTHERM ADULT